# Patient Record
Sex: MALE | Race: WHITE | HISPANIC OR LATINO | URBAN - METROPOLITAN AREA
[De-identification: names, ages, dates, MRNs, and addresses within clinical notes are randomized per-mention and may not be internally consistent; named-entity substitution may affect disease eponyms.]

---

## 2017-02-08 ENCOUNTER — EMERGENCY (EMERGENCY)
Facility: HOSPITAL | Age: 32
LOS: 0 days | Discharge: HOME | End: 2017-02-08
Attending: EMERGENCY MEDICINE | Admitting: EMERGENCY MEDICINE
Payer: COMMERCIAL

## 2017-02-08 VITALS
HEART RATE: 79 BPM | RESPIRATION RATE: 18 BRPM | SYSTOLIC BLOOD PRESSURE: 135 MMHG | DIASTOLIC BLOOD PRESSURE: 77 MMHG | OXYGEN SATURATION: 100 %

## 2017-02-08 VITALS
OXYGEN SATURATION: 100 % | HEART RATE: 89 BPM | SYSTOLIC BLOOD PRESSURE: 157 MMHG | TEMPERATURE: 98 F | DIASTOLIC BLOOD PRESSURE: 91 MMHG | RESPIRATION RATE: 18 BRPM

## 2017-02-08 DIAGNOSIS — Z72.0 TOBACCO USE: ICD-10-CM

## 2017-02-08 DIAGNOSIS — Z79.01 LONG TERM (CURRENT) USE OF ANTICOAGULANTS: ICD-10-CM

## 2017-02-08 DIAGNOSIS — R07.89 OTHER CHEST PAIN: ICD-10-CM

## 2017-02-08 DIAGNOSIS — R07.9 CHEST PAIN, UNSPECIFIED: ICD-10-CM

## 2017-02-08 DIAGNOSIS — I48.91 UNSPECIFIED ATRIAL FIBRILLATION: ICD-10-CM

## 2017-02-08 LAB
ALBUMIN SERPL ELPH-MCNC: 3.9 G/DL — SIGNIFICANT CHANGE UP (ref 3.3–5)
ALP SERPL-CCNC: 93 U/L — SIGNIFICANT CHANGE UP (ref 40–120)
ALT FLD-CCNC: 40 U/L — SIGNIFICANT CHANGE UP (ref 12–78)
ANION GAP SERPL CALC-SCNC: 7 MMOL/L — SIGNIFICANT CHANGE UP (ref 5–17)
AST SERPL-CCNC: 29 U/L — SIGNIFICANT CHANGE UP (ref 15–37)
BASOPHILS # BLD AUTO: 0.1 K/UL — SIGNIFICANT CHANGE UP (ref 0–0.2)
BASOPHILS NFR BLD AUTO: 1.5 % — SIGNIFICANT CHANGE UP (ref 0–2)
BILIRUB SERPL-MCNC: 0.7 MG/DL — SIGNIFICANT CHANGE UP (ref 0.2–1.2)
BUN SERPL-MCNC: 16 MG/DL — SIGNIFICANT CHANGE UP (ref 7–23)
CALCIUM SERPL-MCNC: 8.8 MG/DL — SIGNIFICANT CHANGE UP (ref 8.5–10.1)
CHLORIDE SERPL-SCNC: 107 MMOL/L — SIGNIFICANT CHANGE UP (ref 96–108)
CK SERPL-CCNC: 568 U/L — HIGH (ref 26–308)
CO2 SERPL-SCNC: 28 MMOL/L — SIGNIFICANT CHANGE UP (ref 22–31)
CREAT SERPL-MCNC: 1.18 MG/DL — SIGNIFICANT CHANGE UP (ref 0.5–1.3)
D DIMER BLD IA.RAPID-MCNC: <150 NG/ML DDU — SIGNIFICANT CHANGE UP
EOSINOPHIL # BLD AUTO: 0.1 K/UL — SIGNIFICANT CHANGE UP (ref 0–0.5)
EOSINOPHIL NFR BLD AUTO: 1.3 % — SIGNIFICANT CHANGE UP (ref 0–6)
GLUCOSE SERPL-MCNC: 95 MG/DL — SIGNIFICANT CHANGE UP (ref 70–99)
HCT VFR BLD CALC: 51.1 % — HIGH (ref 39–50)
HGB BLD-MCNC: 16.4 G/DL — SIGNIFICANT CHANGE UP (ref 13–17)
LYMPHOCYTES # BLD AUTO: 1.9 K/UL — SIGNIFICANT CHANGE UP (ref 1–3.3)
LYMPHOCYTES # BLD AUTO: 23.6 % — SIGNIFICANT CHANGE UP (ref 13–44)
MCHC RBC-ENTMCNC: 30.3 PG — SIGNIFICANT CHANGE UP (ref 27–34)
MCHC RBC-ENTMCNC: 32.2 GM/DL — SIGNIFICANT CHANGE UP (ref 32–36)
MCV RBC AUTO: 94.2 FL — SIGNIFICANT CHANGE UP (ref 80–100)
MONOCYTES # BLD AUTO: 0.6 K/UL — SIGNIFICANT CHANGE UP (ref 0–0.9)
MONOCYTES NFR BLD AUTO: 7.9 % — SIGNIFICANT CHANGE UP (ref 2–14)
NEUTROPHILS # BLD AUTO: 5.2 K/UL — SIGNIFICANT CHANGE UP (ref 1.8–7.4)
NEUTROPHILS NFR BLD AUTO: 65.8 % — SIGNIFICANT CHANGE UP (ref 43–77)
PLATELET # BLD AUTO: 289 K/UL — SIGNIFICANT CHANGE UP (ref 150–400)
POTASSIUM SERPL-MCNC: 4 MMOL/L — SIGNIFICANT CHANGE UP (ref 3.5–5.3)
POTASSIUM SERPL-SCNC: 4 MMOL/L — SIGNIFICANT CHANGE UP (ref 3.5–5.3)
PROT SERPL-MCNC: 7.9 GM/DL — SIGNIFICANT CHANGE UP (ref 6–8.3)
RBC # BLD: 5.42 M/UL — SIGNIFICANT CHANGE UP (ref 4.2–5.8)
RBC # FLD: 13 % — SIGNIFICANT CHANGE UP (ref 10.3–14.5)
SODIUM SERPL-SCNC: 142 MMOL/L — SIGNIFICANT CHANGE UP (ref 135–145)
TROPONIN I SERPL-MCNC: <0.015 NG/ML — SIGNIFICANT CHANGE UP (ref 0.01–0.04)
TROPONIN I SERPL-MCNC: <0.015 NG/ML — SIGNIFICANT CHANGE UP (ref 0.01–0.04)
WBC # BLD: 7.9 K/UL — SIGNIFICANT CHANGE UP (ref 3.8–10.5)
WBC # FLD AUTO: 7.9 K/UL — SIGNIFICANT CHANGE UP (ref 3.8–10.5)

## 2017-02-08 PROCEDURE — 93010 ELECTROCARDIOGRAM REPORT: CPT

## 2017-02-08 PROCEDURE — 99284 EMERGENCY DEPT VISIT MOD MDM: CPT | Mod: 25

## 2017-02-08 PROCEDURE — 71010: CPT | Mod: 26

## 2017-02-08 RX ORDER — SODIUM CHLORIDE 9 MG/ML
3 INJECTION INTRAMUSCULAR; INTRAVENOUS; SUBCUTANEOUS ONCE
Qty: 0 | Refills: 0 | Status: COMPLETED | OUTPATIENT
Start: 2017-02-08 | End: 2017-02-08

## 2017-02-08 RX ORDER — ASPIRIN/CALCIUM CARB/MAGNESIUM 324 MG
325 TABLET ORAL ONCE
Qty: 0 | Refills: 0 | Status: COMPLETED | OUTPATIENT
Start: 2017-02-08 | End: 2017-02-08

## 2017-02-08 RX ADMIN — SODIUM CHLORIDE 3 MILLILITER(S): 9 INJECTION INTRAMUSCULAR; INTRAVENOUS; SUBCUTANEOUS at 02:59

## 2017-02-08 RX ADMIN — Medication 325 MILLIGRAM(S): at 02:58

## 2017-02-08 NOTE — ED PROVIDER NOTE - CHPI ED SYMPTOMS NEG
no back pain/no shortness of breath/no chills/no fever/no cough/no nausea/no diaphoresis/no dizziness

## 2017-02-08 NOTE — ED ADULT NURSE NOTE - OBJECTIVE STATEMENT
30 y/o male with hx of Afib on Eliquis presents to ED c/o chest pain started earlier today worsening x2 hours. 30 y/o male with hx of Afib on Eliquis presents to ED c/o chest pain started earlier today worsening last few hours. Pt ran out of Eliquis and missed one dose for today, took 2x metoprolol before coming in to work. Pt did not feel well and BP was elevated with chest tightness.

## 2017-02-08 NOTE — ED ADULT NURSE REASSESSMENT NOTE - NS ED NURSE REASSESS COMMENT FT1
Pt resting comfortably in bed with no acute distress noted. Denies cp,sob,ha,dz,n/v/d or urinary sx. Vitals stable. Will cont to monitor for safety and comfort.

## 2017-02-08 NOTE — ED PROVIDER NOTE - OBJECTIVE STATEMENT
31 m with a hx of a-fib (ran out of his eloquist yest - missed one dose) presents with co chest pain (tightness and fleeting sharp pain) int for the last few hours.  BP was elevated today.  No co HA, dizziness, back pain, fever, chills, nvd.  No recent illnesses.

## 2017-02-08 NOTE — ED ADULT NURSE NOTE - CHPI ED SYMPTOMS NEG
no nausea/no dizziness/no back pain/no syncope/no cough/no shortness of breath/no chills/no diaphoresis/no vomiting/no fever

## 2017-10-11 ENCOUNTER — EMERGENCY (EMERGENCY)
Facility: HOSPITAL | Age: 32
LOS: 0 days | Discharge: HOME | End: 2017-10-11
Attending: EMERGENCY MEDICINE | Admitting: EMERGENCY MEDICINE
Payer: COMMERCIAL

## 2017-10-11 VITALS
DIASTOLIC BLOOD PRESSURE: 70 MMHG | HEART RATE: 88 BPM | OXYGEN SATURATION: 99 % | SYSTOLIC BLOOD PRESSURE: 130 MMHG | RESPIRATION RATE: 18 BRPM

## 2017-10-11 VITALS
OXYGEN SATURATION: 97 % | TEMPERATURE: 100 F | SYSTOLIC BLOOD PRESSURE: 147 MMHG | RESPIRATION RATE: 16 BRPM | HEART RATE: 120 BPM | DIASTOLIC BLOOD PRESSURE: 97 MMHG

## 2017-10-11 LAB
ALBUMIN SERPL ELPH-MCNC: 3.8 G/DL — SIGNIFICANT CHANGE UP (ref 3.3–5)
ALP SERPL-CCNC: 85 U/L — SIGNIFICANT CHANGE UP (ref 40–120)
ALT FLD-CCNC: 41 U/L — SIGNIFICANT CHANGE UP (ref 12–78)
ANION GAP SERPL CALC-SCNC: 10 MMOL/L — SIGNIFICANT CHANGE UP (ref 5–17)
AST SERPL-CCNC: 26 U/L — SIGNIFICANT CHANGE UP (ref 15–37)
BASOPHILS # BLD AUTO: 0.1 K/UL — SIGNIFICANT CHANGE UP (ref 0–0.2)
BASOPHILS NFR BLD AUTO: 0.8 % — SIGNIFICANT CHANGE UP (ref 0–2)
BILIRUB SERPL-MCNC: 1.6 MG/DL — HIGH (ref 0.2–1.2)
BUN SERPL-MCNC: 12 MG/DL — SIGNIFICANT CHANGE UP (ref 7–23)
CALCIUM SERPL-MCNC: 8.9 MG/DL — SIGNIFICANT CHANGE UP (ref 8.5–10.1)
CHLORIDE SERPL-SCNC: 103 MMOL/L — SIGNIFICANT CHANGE UP (ref 96–108)
CO2 SERPL-SCNC: 24 MMOL/L — SIGNIFICANT CHANGE UP (ref 22–31)
CREAT SERPL-MCNC: 1.12 MG/DL — SIGNIFICANT CHANGE UP (ref 0.5–1.3)
EOSINOPHIL # BLD AUTO: 0.1 K/UL — SIGNIFICANT CHANGE UP (ref 0–0.5)
EOSINOPHIL NFR BLD AUTO: 0.4 % — SIGNIFICANT CHANGE UP (ref 0–6)
GLUCOSE SERPL-MCNC: 94 MG/DL — SIGNIFICANT CHANGE UP (ref 70–99)
HCT VFR BLD CALC: 50.3 % — HIGH (ref 39–50)
HGB BLD-MCNC: 16.4 G/DL — SIGNIFICANT CHANGE UP (ref 13–17)
LACTATE SERPL-SCNC: 0.9 MMOL/L — SIGNIFICANT CHANGE UP (ref 0.7–2)
LYMPHOCYTES # BLD AUTO: 1.8 K/UL — SIGNIFICANT CHANGE UP (ref 1–3.3)
LYMPHOCYTES # BLD AUTO: 14.7 % — SIGNIFICANT CHANGE UP (ref 13–44)
MCHC RBC-ENTMCNC: 29.8 PG — SIGNIFICANT CHANGE UP (ref 27–34)
MCHC RBC-ENTMCNC: 32.5 GM/DL — SIGNIFICANT CHANGE UP (ref 32–36)
MCV RBC AUTO: 91.8 FL — SIGNIFICANT CHANGE UP (ref 80–100)
MONOCYTES # BLD AUTO: 0.8 K/UL — SIGNIFICANT CHANGE UP (ref 0–0.9)
MONOCYTES NFR BLD AUTO: 6.5 % — SIGNIFICANT CHANGE UP (ref 2–14)
NEUTROPHILS # BLD AUTO: 9.7 K/UL — HIGH (ref 1.8–7.4)
NEUTROPHILS NFR BLD AUTO: 77.6 % — HIGH (ref 43–77)
PLATELET # BLD AUTO: 268 K/UL — SIGNIFICANT CHANGE UP (ref 150–400)
POTASSIUM SERPL-MCNC: 3.7 MMOL/L — SIGNIFICANT CHANGE UP (ref 3.5–5.3)
POTASSIUM SERPL-SCNC: 3.7 MMOL/L — SIGNIFICANT CHANGE UP (ref 3.5–5.3)
PROT SERPL-MCNC: 8.7 GM/DL — HIGH (ref 6–8.3)
RBC # BLD: 5.48 M/UL — SIGNIFICANT CHANGE UP (ref 4.2–5.8)
RBC # FLD: 11.7 % — SIGNIFICANT CHANGE UP (ref 10.3–14.5)
SODIUM SERPL-SCNC: 137 MMOL/L — SIGNIFICANT CHANGE UP (ref 135–145)
WBC # BLD: 12.5 K/UL — HIGH (ref 3.8–10.5)
WBC # FLD AUTO: 12.5 K/UL — HIGH (ref 3.8–10.5)

## 2017-10-11 PROCEDURE — 99285 EMERGENCY DEPT VISIT HI MDM: CPT | Mod: 25

## 2017-10-11 PROCEDURE — 70491 CT SOFT TISSUE NECK W/DYE: CPT | Mod: 26

## 2017-10-11 RX ORDER — SODIUM CHLORIDE 9 MG/ML
500 INJECTION INTRAMUSCULAR; INTRAVENOUS; SUBCUTANEOUS
Qty: 0 | Refills: 0 | Status: COMPLETED | OUTPATIENT
Start: 2017-10-11 | End: 2017-10-11

## 2017-10-11 RX ORDER — ACETAMINOPHEN 500 MG
1000 TABLET ORAL ONCE
Qty: 0 | Refills: 0 | Status: COMPLETED | OUTPATIENT
Start: 2017-10-11 | End: 2017-10-11

## 2017-10-11 RX ORDER — DEXAMETHASONE 0.5 MG/5ML
10 ELIXIR ORAL ONCE
Qty: 0 | Refills: 0 | Status: COMPLETED | OUTPATIENT
Start: 2017-10-11 | End: 2017-10-11

## 2017-10-11 RX ORDER — SODIUM CHLORIDE 9 MG/ML
3 INJECTION INTRAMUSCULAR; INTRAVENOUS; SUBCUTANEOUS ONCE
Qty: 0 | Refills: 0 | Status: COMPLETED | OUTPATIENT
Start: 2017-10-11 | End: 2017-10-11

## 2017-10-11 RX ORDER — KETOROLAC TROMETHAMINE 30 MG/ML
30 SYRINGE (ML) INJECTION ONCE
Qty: 0 | Refills: 0 | Status: DISCONTINUED | OUTPATIENT
Start: 2017-10-11 | End: 2017-10-11

## 2017-10-11 RX ORDER — AMPICILLIN SODIUM AND SULBACTAM SODIUM 250; 125 MG/ML; MG/ML
3 INJECTION, POWDER, FOR SUSPENSION INTRAMUSCULAR; INTRAVENOUS ONCE
Qty: 0 | Refills: 0 | Status: COMPLETED | OUTPATIENT
Start: 2017-10-11 | End: 2017-10-11

## 2017-10-11 RX ADMIN — SODIUM CHLORIDE 2000 MILLILITER(S): 9 INJECTION INTRAMUSCULAR; INTRAVENOUS; SUBCUTANEOUS at 20:02

## 2017-10-11 RX ADMIN — Medication 1000 MILLIGRAM(S): at 20:36

## 2017-10-11 RX ADMIN — SODIUM CHLORIDE 3 MILLILITER(S): 9 INJECTION INTRAMUSCULAR; INTRAVENOUS; SUBCUTANEOUS at 19:43

## 2017-10-11 RX ADMIN — SODIUM CHLORIDE 2000 MILLILITER(S): 9 INJECTION INTRAMUSCULAR; INTRAVENOUS; SUBCUTANEOUS at 19:02

## 2017-10-11 RX ADMIN — Medication 102 MILLIGRAM(S): at 19:43

## 2017-10-11 RX ADMIN — SODIUM CHLORIDE 2000 MILLILITER(S): 9 INJECTION INTRAMUSCULAR; INTRAVENOUS; SUBCUTANEOUS at 19:32

## 2017-10-11 RX ADMIN — SODIUM CHLORIDE 2000 MILLILITER(S): 9 INJECTION INTRAMUSCULAR; INTRAVENOUS; SUBCUTANEOUS at 19:47

## 2017-10-11 RX ADMIN — SODIUM CHLORIDE 2000 MILLILITER(S): 9 INJECTION INTRAMUSCULAR; INTRAVENOUS; SUBCUTANEOUS at 20:17

## 2017-10-11 RX ADMIN — AMPICILLIN SODIUM AND SULBACTAM SODIUM 200 GRAM(S): 250; 125 INJECTION, POWDER, FOR SUSPENSION INTRAMUSCULAR; INTRAVENOUS at 19:43

## 2017-10-11 RX ADMIN — SODIUM CHLORIDE 2000 MILLILITER(S): 9 INJECTION INTRAMUSCULAR; INTRAVENOUS; SUBCUTANEOUS at 19:17

## 2017-10-11 RX ADMIN — Medication 1000 MILLIGRAM(S): at 19:43

## 2017-10-11 RX ADMIN — Medication 30 MILLIGRAM(S): at 19:43

## 2017-10-11 RX ADMIN — Medication 30 MILLIGRAM(S): at 20:36

## 2017-10-11 NOTE — ED STATDOCS - ATTENDING CONTRIBUTION TO CARE
I, John Bowman, performed the initial face to face bedside interview with this patient regarding history of present illness, review of symptoms and relevant past medical, social and family history.  I completed an independent physical examination.  I was the initial provider who evaluated this patient. I have signed out the follow up of any pending tests (i.e. labs, radiological studies) to the ACP.  I have communicated the patient’s plan of care and disposition with the ACP.  The history, relevant review of systems, past medical and surgical history, medical decision making, and physical examination was documented by the scribe in my presence and I attest to the accuracy of the documentation.

## 2017-10-11 NOTE — ED STATDOCS - OBJECTIVE STATEMENT
32 y/o male with PMHx of HTN, paroxysmal Afib presents to the ED c/o throat pain and swollen glands.  He is c/o difficulty swallowing and talking due to swelling.  Pt states his voice sounds different than usual.  Put on Augmentin yesterday at Urgent Care, took 2 doses yesterday and 1 today.  NKDA.

## 2017-10-11 NOTE — ED STATDOCS - ENMT, MLM
(+) erythema throat, swelling b/l tonsils, right greater than left with some exudate.  (+) Swollen cervical lymph nodes.  Nasal mucosa clear.  Mouth with normal mucosa. and uvula is midline.

## 2017-10-11 NOTE — ED ADULT NURSE NOTE - OBJECTIVE STATEMENT
Sorethroat/ swollen x 3 days pt states he was seen at [facility], prescript for Amoxicillin. pt states today feels worse. denies fever.

## 2017-10-11 NOTE — ED ADULT TRIAGE NOTE - CHIEF COMPLAINT QUOTE
Pt. c/o swollen glands, was seen by his PCP and told to come to the hospital. pt. c/o diff. swallowing and talking.

## 2017-10-11 NOTE — ED STATDOCS - PROGRESS NOTE DETAILS
31 yr. old male PMH: P-A-Fib, presents to ED with sore throat and swollen glands onset 3 days ago. Reports difficulty swallowing and talking due to swelling. Seen at Urgent Care yesterday and Rx. Augmentin took 2 doses yesterday and 1 dose today. Seen and examined by attending in Intake. Plan: IV, IVF, Labs, CT neck soft tissue, Decadron. Will F/U with results and re evaluate. Radames NP Patient feeling better waiting for CT scan to be done. Radames MEHTA

## 2017-10-12 DIAGNOSIS — J02.9 ACUTE PHARYNGITIS, UNSPECIFIED: ICD-10-CM

## 2017-10-12 DIAGNOSIS — R59.0 LOCALIZED ENLARGED LYMPH NODES: ICD-10-CM

## 2017-10-17 LAB
CULTURE RESULTS: SIGNIFICANT CHANGE UP
CULTURE RESULTS: SIGNIFICANT CHANGE UP
SPECIMEN SOURCE: SIGNIFICANT CHANGE UP
SPECIMEN SOURCE: SIGNIFICANT CHANGE UP

## 2020-04-01 ENCOUNTER — EMERGENCY (EMERGENCY)
Facility: HOSPITAL | Age: 35
LOS: 0 days | Discharge: HOME | End: 2020-04-01
Attending: EMERGENCY MEDICINE
Payer: COMMERCIAL

## 2020-04-01 VITALS
HEART RATE: 118 BPM | SYSTOLIC BLOOD PRESSURE: 159 MMHG | OXYGEN SATURATION: 97 % | DIASTOLIC BLOOD PRESSURE: 90 MMHG | RESPIRATION RATE: 18 BRPM | TEMPERATURE: 99 F

## 2020-04-01 VITALS — HEIGHT: 64 IN | WEIGHT: 229.94 LBS

## 2020-04-01 DIAGNOSIS — T78.49XA OTHER ALLERGY, INITIAL ENCOUNTER: ICD-10-CM
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
## 2020-04-01 DIAGNOSIS — J02.9 ACUTE PHARYNGITIS, UNSPECIFIED: ICD-10-CM

## 2020-04-01 DIAGNOSIS — L29.9 PRURITUS, UNSPECIFIED: ICD-10-CM

## 2020-04-01 DIAGNOSIS — R51 HEADACHE: ICD-10-CM

## 2020-04-01 DIAGNOSIS — I48.91 UNSPECIFIED ATRIAL FIBRILLATION: ICD-10-CM

## 2020-04-01 DIAGNOSIS — I10 ESSENTIAL (PRIMARY) HYPERTENSION: ICD-10-CM
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
## 2020-04-01 PROCEDURE — 99282 EMERGENCY DEPT VISIT SF MDM: CPT

## 2020-04-01 PROCEDURE — 99053 MED SERV 10PM-8AM 24 HR FAC: CPT

## 2020-04-01 RX ORDER — DIPHENHYDRAMINE HCL 50 MG
25 CAPSULE ORAL ONCE
Refills: 0 | Status: COMPLETED | OUTPATIENT
Start: 2020-04-01 | End: 2020-04-01

## 2020-04-01 RX ADMIN — Medication 25 MILLIGRAM(S): at 01:28

## 2020-04-01 NOTE — ED PROVIDER NOTE - NSFOLLOWUPINSTRUCTIONS_ED_ALL_ED_FT
Benadryl 25mg every 6 hours for the itching  Return to ED for any further concern  Use different gloves at work.

## 2020-04-01 NOTE — ED PROVIDER NOTE - OBJECTIVE STATEMENT
35 yo male with h/o Afib, c/o reaction to hands while at work.  Pt works in housekeeping in .  Was wearing gloves and using the usual chemicals for cleaning.  He has put on and taken off multiple pairs of gloves tonight without issue.  Just PTA, took off gloves after cleaning a room and noticed a white film on his hands.  Predominately on the right second and third palmar surface and spots on dorsal surface and on left thumb.  Itchy.  No other body parts with similar reaction or itching.

## 2020-04-01 NOTE — ED PROVIDER NOTE - SKIN, MLM
Mild erythema to the bilateral hands with a nonblanching, nonremovable white discoloration to the right palmar surface of the second and third digits with spots of similar discoloration to the dorsal surface and the left thumb.  No open skin, lesions, vesicles

## 2020-04-01 NOTE — ED PROVIDER NOTE - PATIENT PORTAL LINK FT
You can access the FollowMyHealth Patient Portal offered by [facility] by registering at the following website: [email or web address]. By joining [employer]’s FollowMyHealth portal, you will also be able to view your health information using other applications (apps) compatible with our system.

## 2020-04-01 NOTE — ED ADULT NURSE NOTE - OBJECTIVE STATEMENT
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

## 2020-04-01 NOTE — ED ADULT TRIAGE NOTE - CHIEF COMPLAINT QUOTE
had a reaction to R hand while cleaning rooms in the ED, patient was wearing gloves. Site is discolored and itchy according to patient.

## 2020-04-01 NOTE — ED PROVIDER NOTE - CLINICAL SUMMARY MEDICAL DECISION MAKING FREE TEXT BOX
skin reaction, chemical burn vs allergic reaction.  the gloves being used are relatively new, but otherwise no new chemicals.  there was no spill or massive exposure tonight.  will give benadryl and pt should use other gloves

## 2020-04-26 ENCOUNTER — MESSAGE (OUTPATIENT)
Age: 35
End: 2020-04-26

## 2020-11-03 ENCOUNTER — EMERGENCY (EMERGENCY)
Facility: HOSPITAL | Age: 35
LOS: 0 days | Discharge: HOME | End: 2020-11-03
Payer: COMMERCIAL

## 2020-11-03 VITALS
SYSTOLIC BLOOD PRESSURE: 162 MMHG | DIASTOLIC BLOOD PRESSURE: 99 MMHG | HEIGHT: 64 IN | OXYGEN SATURATION: 97 % | HEART RATE: 95 BPM | RESPIRATION RATE: 18 BRPM | TEMPERATURE: 98 F

## 2020-11-03 DIAGNOSIS — B34.9 VIRAL INFECTION, UNSPECIFIED: ICD-10-CM

## 2020-11-03 DIAGNOSIS — Z20.828 CONTACT WITH AND (SUSPECTED) EXPOSURE TO OTHER VIRAL COMMUNICABLE DISEASES: ICD-10-CM

## 2020-11-03 DIAGNOSIS — R09.81 NASAL CONGESTION: ICD-10-CM

## 2020-11-03 DIAGNOSIS — R09.89 OTHER SPECIFIED SYMPTOMS AND SIGNS INVOLVING THE CIRCULATORY AND RESPIRATORY SYSTEMS: ICD-10-CM

## 2020-11-03 DIAGNOSIS — R05 COUGH: ICD-10-CM

## 2020-11-03 LAB — SARS-COV-2 RNA SPEC QL NAA+PROBE: SIGNIFICANT CHANGE UP

## 2020-11-03 PROCEDURE — 99283 EMERGENCY DEPT VISIT LOW MDM: CPT

## 2020-11-03 PROCEDURE — U0003: CPT

## 2020-11-03 NOTE — ED STATDOCS - OBJECTIVE STATEMENT
Pt presents to ED with congestion, cough, runny nose x 2 days. Pt not recently exposed to COVID-19. Pt here for testing. Pt is [employer] employee.

## 2020-11-03 NOTE — ED STATDOCS - PATIENT PORTAL LINK FT
You can access the FollowMyHealth Patient Portal offered by [facility] by registering at the following website: [email or web address]. By joining [employer]’s FollowMyHealth portal, you will also be able to view your health information using other applications (apps) compatible with our system.

## 2020-11-03 NOTE — ED ADULT TRIAGE NOTE - CHIEF COMPLAINT QUOTE
pt presents to ED requesting COVID testing. No symptoms reported.     Patient evaluated, treated, and discharged by PA. Refer to PA note for assessment.  Discharge instructions given verbally and understood by patient. Self-quarantine and COVID-19 information provided to patient. Unable to sign secondary to COVID-19 PUI.  pt gives verbal consent to text results

## 2020-11-11 ENCOUNTER — EMERGENCY (EMERGENCY)
Facility: HOSPITAL | Age: 35
LOS: 0 days | Discharge: LEFT BEFORE DISCHARGE | End: 2020-11-12
Attending: EMERGENCY MEDICINE
Payer: COMMERCIAL

## 2020-11-11 VITALS
DIASTOLIC BLOOD PRESSURE: 85 MMHG | HEIGHT: 64 IN | WEIGHT: 300.05 LBS | TEMPERATURE: 98 F | HEART RATE: 126 BPM | SYSTOLIC BLOOD PRESSURE: 154 MMHG | OXYGEN SATURATION: 98 % | RESPIRATION RATE: 18 BRPM

## 2020-11-11 DIAGNOSIS — H47.10 UNSPECIFIED PAPILLEDEMA: ICD-10-CM

## 2020-11-11 DIAGNOSIS — Z53.8 PROCEDURE AND TREATMENT NOT CARRIED OUT FOR OTHER REASONS: ICD-10-CM

## 2020-11-11 DIAGNOSIS — I48.0 PAROXYSMAL ATRIAL FIBRILLATION: ICD-10-CM

## 2020-11-11 DIAGNOSIS — I10 ESSENTIAL (PRIMARY) HYPERTENSION: ICD-10-CM

## 2020-11-11 LAB
ALBUMIN SERPL ELPH-MCNC: 3.3 G/DL — SIGNIFICANT CHANGE UP (ref 3.3–5)
ALP SERPL-CCNC: 86 U/L — SIGNIFICANT CHANGE UP (ref 40–120)
ALT FLD-CCNC: 43 U/L — SIGNIFICANT CHANGE UP (ref 12–78)
ANION GAP SERPL CALC-SCNC: 4 MMOL/L — LOW (ref 5–17)
APTT BLD: 31.5 SEC — SIGNIFICANT CHANGE UP (ref 27.5–35.5)
AST SERPL-CCNC: 35 U/L — SIGNIFICANT CHANGE UP (ref 15–37)
BASOPHILS # BLD AUTO: 0.02 K/UL — SIGNIFICANT CHANGE UP (ref 0–0.2)
BASOPHILS NFR BLD AUTO: 0.2 % — SIGNIFICANT CHANGE UP (ref 0–2)
BILIRUB SERPL-MCNC: 0.5 MG/DL — SIGNIFICANT CHANGE UP (ref 0.2–1.2)
BUN SERPL-MCNC: 15 MG/DL — SIGNIFICANT CHANGE UP (ref 7–23)
CALCIUM SERPL-MCNC: 8.3 MG/DL — LOW (ref 8.5–10.1)
CHLORIDE SERPL-SCNC: 108 MMOL/L — SIGNIFICANT CHANGE UP (ref 96–108)
CO2 SERPL-SCNC: 29 MMOL/L — SIGNIFICANT CHANGE UP (ref 22–31)
CREAT SERPL-MCNC: 1.26 MG/DL — SIGNIFICANT CHANGE UP (ref 0.5–1.3)
EOSINOPHIL # BLD AUTO: 0.14 K/UL — SIGNIFICANT CHANGE UP (ref 0–0.5)
EOSINOPHIL NFR BLD AUTO: 1.5 % — SIGNIFICANT CHANGE UP (ref 0–6)
GLUCOSE SERPL-MCNC: 118 MG/DL — HIGH (ref 70–99)
HCT VFR BLD CALC: 47.9 % — SIGNIFICANT CHANGE UP (ref 39–50)
HGB BLD-MCNC: 15 G/DL — SIGNIFICANT CHANGE UP (ref 13–17)
IMM GRANULOCYTES NFR BLD AUTO: 0.3 % — SIGNIFICANT CHANGE UP (ref 0–1.5)
INR BLD: 0.96 RATIO — SIGNIFICANT CHANGE UP (ref 0.88–1.16)
LYMPHOCYTES # BLD AUTO: 2.93 K/UL — SIGNIFICANT CHANGE UP (ref 1–3.3)
LYMPHOCYTES # BLD AUTO: 31.4 % — SIGNIFICANT CHANGE UP (ref 13–44)
MCHC RBC-ENTMCNC: 29.6 PG — SIGNIFICANT CHANGE UP (ref 27–34)
MCHC RBC-ENTMCNC: 31.3 GM/DL — LOW (ref 32–36)
MCV RBC AUTO: 94.5 FL — SIGNIFICANT CHANGE UP (ref 80–100)
MONOCYTES # BLD AUTO: 0.96 K/UL — HIGH (ref 0–0.9)
MONOCYTES NFR BLD AUTO: 10.3 % — SIGNIFICANT CHANGE UP (ref 2–14)
NEUTROPHILS # BLD AUTO: 5.24 K/UL — SIGNIFICANT CHANGE UP (ref 1.8–7.4)
NEUTROPHILS NFR BLD AUTO: 56.3 % — SIGNIFICANT CHANGE UP (ref 43–77)
PLATELET # BLD AUTO: 237 K/UL — SIGNIFICANT CHANGE UP (ref 150–400)
POTASSIUM SERPL-MCNC: 4.1 MMOL/L — SIGNIFICANT CHANGE UP (ref 3.5–5.3)
POTASSIUM SERPL-SCNC: 4.1 MMOL/L — SIGNIFICANT CHANGE UP (ref 3.5–5.3)
PROT SERPL-MCNC: 7.6 GM/DL — SIGNIFICANT CHANGE UP (ref 6–8.3)
PROTHROM AB SERPL-ACNC: 11.3 SEC — SIGNIFICANT CHANGE UP (ref 10.6–13.6)
RBC # BLD: 5.07 M/UL — SIGNIFICANT CHANGE UP (ref 4.2–5.8)
RBC # FLD: 13.2 % — SIGNIFICANT CHANGE UP (ref 10.3–14.5)
SODIUM SERPL-SCNC: 141 MMOL/L — SIGNIFICANT CHANGE UP (ref 135–145)
WBC # BLD: 9.32 K/UL — SIGNIFICANT CHANGE UP (ref 3.8–10.5)
WBC # FLD AUTO: 9.32 K/UL — SIGNIFICANT CHANGE UP (ref 3.8–10.5)

## 2020-11-11 PROCEDURE — 99285 EMERGENCY DEPT VISIT HI MDM: CPT | Mod: 25

## 2020-11-11 PROCEDURE — 36415 COLL VENOUS BLD VENIPUNCTURE: CPT

## 2020-11-11 PROCEDURE — 62270 DX LMBR SPI PNXR: CPT

## 2020-11-11 PROCEDURE — 85730 THROMBOPLASTIN TIME PARTIAL: CPT

## 2020-11-11 PROCEDURE — 93005 ELECTROCARDIOGRAM TRACING: CPT

## 2020-11-11 PROCEDURE — 80053 COMPREHEN METABOLIC PANEL: CPT

## 2020-11-11 PROCEDURE — 70496 CT ANGIOGRAPHY HEAD: CPT

## 2020-11-11 PROCEDURE — 85610 PROTHROMBIN TIME: CPT

## 2020-11-11 PROCEDURE — 93010 ELECTROCARDIOGRAM REPORT: CPT

## 2020-11-11 PROCEDURE — 85025 COMPLETE CBC W/AUTO DIFF WBC: CPT

## 2020-11-11 PROCEDURE — 85652 RBC SED RATE AUTOMATED: CPT

## 2020-11-11 PROCEDURE — 62270 DX LMBR SPI PNXR: CPT | Mod: 53

## 2020-11-11 PROCEDURE — 99285 EMERGENCY DEPT VISIT HI MDM: CPT

## 2020-11-11 NOTE — ED PROVIDER NOTE - OBJECTIVE STATEMENT
34M hx htn afib on xarelto presents to the ED for papilledema. Pt was being treated for sinus infection with zpack - had routine optho eval found to have papilledema- -sent to specialist tonight at sight MD. did eye scans 20/20 vision b/l but significant papilledema sent in to get ct imaging/spinal tap to r/o pseudotumor vs CST. pt currently denies any pain, headache visual changes nausea vomiting.

## 2020-11-11 NOTE — ED PROVIDER NOTE - CLINICAL SUMMARY MEDICAL DECISION MAKING FREE TEXT BOX
34M hx htn afib on xarelto presents to the ED for papilledema. Pt was being treated for sinus infection with zpack - had routine optho eval found to have papilledema- -sent to specialist tonight at sight MD Dr Kiser . did eye scans 20/20 vision b/l but significant papilledema sent in to get ct imaging/spinal tap to r/o pseudotumor vs CST. pt currently denies any pain, headache visual changes nausea vomiting.  exam with 20/20 b/l pressure 16 b/l. given significant papilledema will obtain labs ct lp and reassess. Kevan Jackson M.D., Attending Physician

## 2020-11-11 NOTE — ED PROVIDER NOTE - PROGRESS NOTE DETAILS
awaiting CT head - pt consented for LP. given pt body habitus did bedside US to see how deep spine is. spine is at 10-12cm on ultrasound in location of LP. this is out of range for our ED 3.5in spinal needles so procedure was not performed. Called nursing admin to get longer LP needle. Paged Dr. Pulliam of neurology to discuss. Kevan aJckson M.D., Attending Physician Spoke with Dr. Edwards covering neurologist - discussed case. ctv negative. rec for MRI LP and admission. will see patient in morning. Kevan Jackson M.D., Attending Physician attempted LP with long spinal needle  however unsuccessful. pt will require IR for LP and MR in am. Kevan Jackson M.D., Attending Physician pt states he wants to leave- discussed need for LP to r/o IIH, MRI to r/o mass. explained not figuring out what is causing papilledema could lead to blindness/be life threatening. pt understands but wants to go. pt is aaox4 and of sound mind to make this decsion. pt given neurology (has optho ) follow up. strongly encouraged to return immediately to the ED if he wants to complete his workup. Kevan Jackson M.D., Attending Physician

## 2020-11-11 NOTE — ED ADULT TRIAGE NOTE - CHIEF COMPLAINT QUOTE
pt was sent by eye doctor for significant papilledema . r/o pseudotumor/ Denies vision changes, eye pressure, headache.

## 2020-11-11 NOTE — ED PROVIDER NOTE - NSFOLLOWUPINSTRUCTIONS_ED_ALL_ED_FT
1. return for worsening symptoms or anything concerning to you  2. take all home meds as prescribed  3. follow up with your pmd call to make an appointment  4. you are leaving AMA. You have papilledema and if not treated could lead to blindness/underlying cause could be life threatening. if you want to complete your workup return immediately to the ED>   5. follow up with your eye doctor  6. follow up with neurology

## 2020-11-11 NOTE — ED ADULT NURSE NOTE - EXPLANATION OF PATIENT'S REASON FOR LEAVING
pt did not want to stay in hospital any longer, states he will f/u outpatient. See nurse reassessment note.

## 2020-11-11 NOTE — ED ADULT NURSE NOTE - OBJECTIVE STATEMENT
Patient went for an annual eye exam which sent him to a eye specialist which told him to come to the ED for a CT scan to rule out papilledema.  Patient denies any symptoms except for a sinus infection that he has been dealing with.  Denies vision changes, eye pain or eye pressure.

## 2020-11-11 NOTE — ED PROVIDER NOTE - PATIENT PORTAL LINK FT
You can access the FollowMyHealth Patient Portal offered by [facility] by registering at the following website: [email or web address]. By joining [employer]’s FollowMyHealth portal, you will also be able to view your health information using other applications (apps) compatible with our system.

## 2020-11-11 NOTE — ED PROVIDER NOTE - PHYSICAL EXAMINATION
Constitutional: NAD AAOx3  Eyes: PERRLA EOMI 20/20 b/l pressure 16 b/l  Head: Normocephalic atraumatic  Mouth: MMM  Cardiac: tachycardic  Resp: Lungs CTAB  GI: Abd s/nt/nd  Neuro: CN2-12 intact  Skin: No rashes

## 2020-11-12 VITALS
DIASTOLIC BLOOD PRESSURE: 76 MMHG | RESPIRATION RATE: 20 BRPM | HEART RATE: 96 BPM | OXYGEN SATURATION: 100 % | TEMPERATURE: 99 F | SYSTOLIC BLOOD PRESSURE: 130 MMHG

## 2020-11-12 PROBLEM — Z00.00 ENCOUNTER FOR PREVENTIVE HEALTH EXAMINATION: Status: ACTIVE | Noted: 2020-11-12

## 2020-11-12 LAB — ERYTHROCYTE [SEDIMENTATION RATE] IN BLOOD: 5 MM/HR — SIGNIFICANT CHANGE UP (ref 0–15)

## 2020-11-12 PROCEDURE — 70496 CT ANGIOGRAPHY HEAD: CPT | Mod: 26

## 2020-11-12 NOTE — ED ADULT NURSE REASSESSMENT NOTE - NS ED NURSE REASSESS COMMENT FT1
Pt does not want to be admitted for full work up including LP and MRI to rule out mass. Pt is AAOx4. D/w patient importance of being admitted to have scans and lumbar puncture done. Reiterated the importance of f/u with doctor outpatient for scans. Pt states he will follow up outpatient. Discussed thoroughly s/s for patient to come back into the ER.

## 2020-12-03 ENCOUNTER — APPOINTMENT (OUTPATIENT)
Dept: NEUROLOGY | Facility: CLINIC | Age: 35
End: 2020-12-03
Payer: COMMERCIAL

## 2020-12-03 VITALS
SYSTOLIC BLOOD PRESSURE: 124 MMHG | WEIGHT: 315 LBS | DIASTOLIC BLOOD PRESSURE: 80 MMHG | BODY MASS INDEX: 45.1 KG/M2 | HEIGHT: 70 IN | HEART RATE: 88 BPM | TEMPERATURE: 97.5 F

## 2020-12-03 DIAGNOSIS — Z78.9 OTHER SPECIFIED HEALTH STATUS: ICD-10-CM

## 2020-12-03 DIAGNOSIS — Z83.3 FAMILY HISTORY OF DIABETES MELLITUS: ICD-10-CM

## 2020-12-03 DIAGNOSIS — Z87.09 PERSONAL HISTORY OF OTHER DISEASES OF THE RESPIRATORY SYSTEM: ICD-10-CM

## 2020-12-03 DIAGNOSIS — F17.200 NICOTINE DEPENDENCE, UNSPECIFIED, UNCOMPLICATED: ICD-10-CM

## 2020-12-03 DIAGNOSIS — Z82.49 FAMILY HISTORY OF ISCHEMIC HEART DISEASE AND OTHER DISEASES OF THE CIRCULATORY SYSTEM: ICD-10-CM

## 2020-12-03 PROCEDURE — 99204 OFFICE O/P NEW MOD 45 MIN: CPT

## 2020-12-03 PROCEDURE — 99072 ADDL SUPL MATRL&STAF TM PHE: CPT

## 2020-12-03 NOTE — DISCUSSION/SUMMARY
[FreeTextEntry1] : He is  34-year-old patient with obesity with incidental finding of bilateral papilledema noted on incidental examination seen and evaluated by ophthalmology and recommended ER evaluation and unable to perform lumbar puncture recommended neurological evaluation.\par Patient coming for evaluation for possible pseudotumor cerebri.\par Patient has no symptoms of headache, dizziness, focal weakness or headaches.\par Central causes to be ruled out.\par Patient had CT scan of the brain without contrast and CT venogram done.\par  Recommend patient to have MRI of the brain with and without contrast with MRV .\par Lumbar puncture under fluoroscopy to rule out pseudotumor cerebri to be arranged.\par Discussed with the patient at length.\par Patient education provided regarding losing weight and exercising.\par Followup evaluation after the above workup is completed.\par Follow up with you for other medical problems.\par

## 2020-12-03 NOTE — REASON FOR VISIT
[Consultation] : a consultation visit [FreeTextEntry1] : Evaluation for Bilateral Papilledema asymptomatic seen by Opthalmology and sent to ER and ref to office

## 2020-12-03 NOTE — REVIEW OF SYSTEMS
[Negative] : Heme/Lymph [FreeTextEntry2] : Obesity [de-identified] : Incidental Papilledema noted on eye exam

## 2020-12-03 NOTE — PHYSICAL EXAM
[FreeTextEntry1] : Obese [Oriented To Time, Place, And Person] : oriented to person, place, and time [Impaired Insight] : insight and judgment were intact [Affect] : the affect was normal [Person] : oriented to person [Place] : oriented to place [Time] : oriented to time [Concentration Intact] : normal concentrating ability [Visual Intact] : visual attention was ~T not ~L decreased [Naming Objects] : no difficulty naming common objects [Repeating Phrases] : no difficulty repeating a phrase [Writing A Sentence] : no difficulty writing a sentence [Fluency] : fluency intact [Comprehension] : comprehension intact [Reading] : reading intact [Past History] : adequate knowledge of personal past history [Cranial Nerves Oculomotor (III)] : extraocular motion intact [Cranial Nerves Trigeminal (V)] : facial sensation intact symmetrically [Cranial Nerves Facial (VII)] : face symmetrical [Cranial Nerves Vestibulocochlear (VIII)] : hearing was intact bilaterally [Cranial Nerves Glossopharyngeal (IX)] : tongue and palate midline [Cranial Nerves Accessory (XI - Cranial And Spinal)] : head turning and shoulder shrug symmetric [Cranial Nerves Hypoglossal (XII)] : there was no tongue deviation with protrusion [Motor Strength] : muscle strength was normal in all four extremities [No Muscle Atrophy] : normal bulk in all four extremities [Sensation Tactile Decrease] : light touch was intact [Balance] : balance was intact [Past-pointing] : there was no past-pointing [Tremor] : no tremor present [2+] : Brachioradialis left 2+ [1+] : Ankle jerk left 1+ [Plantar Reflex Right Only] : normal on the right [Plantar Reflex Left Only] : normal on the left [FreeTextEntry5] : Bilat discrete blurring of disc margins  [Sclera] : the sclera and conjunctiva were normal [PERRL With Normal Accommodation] : pupils were equal in size, round, reactive to light, with normal accommodation [Extraocular Movements] : extraocular movements were intact [Outer Ear] : the ears and nose were normal in appearance [Oropharynx] : the oropharynx was normal [Neck Appearance] : the appearance of the neck was normal [Neck Cervical Mass (___cm)] : no neck mass was observed [Jugular Venous Distention Increased] : there was no jugular-venous distention [Thyroid Diffuse Enlargement] : the thyroid was not enlarged [Thyroid Nodule] : there were no palpable thyroid nodules [Auscultation Breath Sounds / Voice Sounds] : lungs were clear to auscultation bilaterally [Heart Rate And Rhythm] : heart rate was normal and rhythm regular [Heart Sounds] : normal S1 and S2 [Heart Sounds Gallop] : no gallops [Murmurs] : no murmurs [Heart Sounds Pericardial Friction Rub] : no pericardial rub [Full Pulse] : the pedal pulses are present [Edema] : there was no peripheral edema [Bowel Sounds] : normal bowel sounds [Abdomen Soft] : soft [Abdomen Tenderness] : non-tender [Abdomen Mass (___ Cm)] : no abdominal mass palpated [No CVA Tenderness] : no ~M costovertebral angle tenderness [No Spinal Tenderness] : no spinal tenderness [Abnormal Walk] : normal gait [Nail Clubbing] : no clubbing  or cyanosis of the fingernails [Musculoskeletal - Swelling] : no joint swelling seen [Motor Tone] : muscle strength and tone were normal [Skin Color & Pigmentation] : normal skin color and pigmentation [Skin Turgor] : normal skin turgor [] : no rash

## 2020-12-03 NOTE — HISTORY OF PRESENT ILLNESS
[FreeTextEntry1] : He is 34-year-old patient coming here for evaluation for bilateral papilledema with possible pseudotumor cerebri seen and evaluated in the emergency room on 11/11/20 after seen in ophthalmology office for incidental finding of papilledema seen in optometrist  office.\par Patient was seen and evaluated by sight M.D. and referred to [facility] emergency room where patient was evaluated with CT scan with CT venogram  reported negative and attempted lumbar puncture which failed. Patient refused to wait to be seen by neurology and referred to office visit.\par Patient denies of any headaches, dizziness, focal weakness, visual disturbance. Obesity since childhood not on any medications currently. Works in the hospital employed full time. Without any difficulties.

## 2020-12-23 ENCOUNTER — RESULT REVIEW (OUTPATIENT)
Age: 35
End: 2020-12-23

## 2020-12-23 ENCOUNTER — APPOINTMENT (OUTPATIENT)
Dept: MRI IMAGING | Facility: CLINIC | Age: 35
End: 2020-12-23
Payer: COMMERCIAL

## 2020-12-23 ENCOUNTER — OUTPATIENT (OUTPATIENT)
Dept: OUTPATIENT SERVICES | Facility: HOSPITAL | Age: 35
LOS: 1 days | End: 2020-12-23
Payer: COMMERCIAL

## 2020-12-23 DIAGNOSIS — H47.10 UNSPECIFIED PAPILLEDEMA: ICD-10-CM

## 2020-12-23 PROCEDURE — 70553 MRI BRAIN STEM W/O & W/DYE: CPT | Mod: 26

## 2020-12-23 PROCEDURE — 70544 MR ANGIOGRAPHY HEAD W/O DYE: CPT | Mod: 26,59

## 2020-12-23 PROCEDURE — 70544 MR ANGIOGRAPHY HEAD W/O DYE: CPT

## 2020-12-23 PROCEDURE — A9585: CPT

## 2020-12-23 PROCEDURE — 70553 MRI BRAIN STEM W/O & W/DYE: CPT

## 2020-12-26 ENCOUNTER — OUTPATIENT (OUTPATIENT)
Dept: OUTPATIENT SERVICES | Facility: HOSPITAL | Age: 35
LOS: 1 days | End: 2020-12-26
Payer: COMMERCIAL

## 2020-12-26 DIAGNOSIS — Z11.59 ENCOUNTER FOR SCREENING FOR OTHER VIRAL DISEASES: ICD-10-CM

## 2020-12-26 LAB — SARS-COV-2 RNA SPEC QL NAA+PROBE: SIGNIFICANT CHANGE UP

## 2020-12-26 PROCEDURE — U0003: CPT

## 2020-12-27 DIAGNOSIS — Z11.59 ENCOUNTER FOR SCREENING FOR OTHER VIRAL DISEASES: ICD-10-CM

## 2020-12-27 DIAGNOSIS — Z20.828 CONTACT WITH AND (SUSPECTED) EXPOSURE TO OTHER VIRAL COMMUNICABLE DISEASES: ICD-10-CM

## 2020-12-29 ENCOUNTER — RESULT REVIEW (OUTPATIENT)
Age: 35
End: 2020-12-29

## 2020-12-29 ENCOUNTER — OUTPATIENT (OUTPATIENT)
Dept: OUTPATIENT SERVICES | Facility: HOSPITAL | Age: 35
LOS: 1 days | Discharge: HOME | End: 2020-12-29
Payer: COMMERCIAL

## 2020-12-29 VITALS
SYSTOLIC BLOOD PRESSURE: 118 MMHG | DIASTOLIC BLOOD PRESSURE: 69 MMHG | RESPIRATION RATE: 18 BRPM | HEART RATE: 101 BPM | TEMPERATURE: 98 F | OXYGEN SATURATION: 98 %

## 2020-12-29 VITALS
WEIGHT: 315 LBS | TEMPERATURE: 99 F | DIASTOLIC BLOOD PRESSURE: 95 MMHG | RESPIRATION RATE: 16 BRPM | OXYGEN SATURATION: 97 % | HEART RATE: 100 BPM | SYSTOLIC BLOOD PRESSURE: 149 MMHG | HEIGHT: 70 IN

## 2020-12-29 DIAGNOSIS — H47.10 UNSPECIFIED PAPILLEDEMA: ICD-10-CM

## 2020-12-29 DIAGNOSIS — G93.2 BENIGN INTRACRANIAL HYPERTENSION: ICD-10-CM

## 2020-12-29 LAB
ANION GAP SERPL CALC-SCNC: 3 MMOL/L — LOW (ref 5–17)
BUN SERPL-MCNC: 14 MG/DL — SIGNIFICANT CHANGE UP (ref 7–23)
CALCIUM SERPL-MCNC: 8.9 MG/DL — SIGNIFICANT CHANGE UP (ref 8.5–10.1)
CHLORIDE SERPL-SCNC: 106 MMOL/L — SIGNIFICANT CHANGE UP (ref 96–108)
CO2 SERPL-SCNC: 31 MMOL/L — SIGNIFICANT CHANGE UP (ref 22–31)
CREAT SERPL-MCNC: 1.2 MG/DL — SIGNIFICANT CHANGE UP (ref 0.5–1.3)
GLUCOSE SERPL-MCNC: 95 MG/DL — SIGNIFICANT CHANGE UP (ref 70–99)
HCT VFR BLD CALC: 47.5 % — SIGNIFICANT CHANGE UP (ref 39–50)
HGB BLD-MCNC: 14.3 G/DL — SIGNIFICANT CHANGE UP (ref 13–17)
INR BLD: 1.01 RATIO — SIGNIFICANT CHANGE UP (ref 0.88–1.16)
MCHC RBC-ENTMCNC: 29.1 PG — SIGNIFICANT CHANGE UP (ref 27–34)
MCHC RBC-ENTMCNC: 30.1 GM/DL — LOW (ref 32–36)
MCV RBC AUTO: 96.7 FL — SIGNIFICANT CHANGE UP (ref 80–100)
PLATELET # BLD AUTO: 232 K/UL — SIGNIFICANT CHANGE UP (ref 150–400)
POTASSIUM SERPL-MCNC: 3.7 MMOL/L — SIGNIFICANT CHANGE UP (ref 3.5–5.3)
POTASSIUM SERPL-SCNC: 3.7 MMOL/L — SIGNIFICANT CHANGE UP (ref 3.5–5.3)
PROTHROM AB SERPL-ACNC: 11.8 SEC — SIGNIFICANT CHANGE UP (ref 10.6–13.6)
RBC # BLD: 4.91 M/UL — SIGNIFICANT CHANGE UP (ref 4.2–5.8)
RBC # FLD: 13.2 % — SIGNIFICANT CHANGE UP (ref 10.3–14.5)
SODIUM SERPL-SCNC: 140 MMOL/L — SIGNIFICANT CHANGE UP (ref 135–145)
WBC # BLD: 8.7 K/UL — SIGNIFICANT CHANGE UP (ref 3.8–10.5)
WBC # FLD AUTO: 8.7 K/UL — SIGNIFICANT CHANGE UP (ref 3.8–10.5)

## 2020-12-29 PROCEDURE — 89051 BODY FLUID CELL COUNT: CPT

## 2020-12-29 PROCEDURE — 36415 COLL VENOUS BLD VENIPUNCTURE: CPT

## 2020-12-29 PROCEDURE — 87483 CNS DNA AMP PROBE TYPE 12-25: CPT

## 2020-12-29 PROCEDURE — 87102 FUNGUS ISOLATION CULTURE: CPT

## 2020-12-29 PROCEDURE — 87070 CULTURE OTHR SPECIMN AEROBIC: CPT

## 2020-12-29 PROCEDURE — 80048 BASIC METABOLIC PNL TOTAL CA: CPT

## 2020-12-29 PROCEDURE — 86618 LYME DISEASE ANTIBODY: CPT

## 2020-12-29 PROCEDURE — 84166 PROTEIN E-PHORESIS/URINE/CSF: CPT

## 2020-12-29 PROCEDURE — 82945 GLUCOSE OTHER FLUID: CPT

## 2020-12-29 PROCEDURE — 83916 OLIGOCLONAL BANDS: CPT

## 2020-12-29 PROCEDURE — 62328 DX LMBR SPI PNXR W/FLUOR/CT: CPT

## 2020-12-29 PROCEDURE — 85027 COMPLETE CBC AUTOMATED: CPT

## 2020-12-29 PROCEDURE — 85610 PROTHROMBIN TIME: CPT

## 2020-12-29 PROCEDURE — 83873 ASSAY OF CSF PROTEIN: CPT

## 2020-12-29 PROCEDURE — 84157 ASSAY OF PROTEIN OTHER: CPT

## 2020-12-29 NOTE — ASU PATIENT PROFILE, ADULT - PMH
Atrial fibrillation  paroxysmal  HTN (hypertension)     Atrial fibrillation  paroxysmal  Chronic sinusitis    HTN (hypertension)    Morbid obesity    Papilledema of both eyes

## 2020-12-31 ENCOUNTER — OUTPATIENT (OUTPATIENT)
Dept: OUTPATIENT SERVICES | Facility: HOSPITAL | Age: 35
LOS: 1 days | End: 2020-12-31

## 2020-12-31 DIAGNOSIS — G93.2 BENIGN INTRACRANIAL HYPERTENSION: ICD-10-CM

## 2020-12-31 DIAGNOSIS — H47.10 UNSPECIFIED PAPILLEDEMA: ICD-10-CM

## 2020-12-31 PROBLEM — J32.9 CHRONIC SINUSITIS, UNSPECIFIED: Chronic | Status: ACTIVE | Noted: 2020-12-29

## 2020-12-31 PROBLEM — E66.01 MORBID (SEVERE) OBESITY DUE TO EXCESS CALORIES: Chronic | Status: ACTIVE | Noted: 2020-12-29

## 2021-01-01 DIAGNOSIS — G93.2 BENIGN INTRACRANIAL HYPERTENSION: ICD-10-CM

## 2021-01-01 DIAGNOSIS — H47.10 UNSPECIFIED PAPILLEDEMA: ICD-10-CM

## 2021-01-04 ENCOUNTER — EMERGENCY (EMERGENCY)
Facility: HOSPITAL | Age: 36
LOS: 0 days | Discharge: HOME | End: 2021-01-04
Attending: STUDENT IN AN ORGANIZED HEALTH CARE EDUCATION/TRAINING PROGRAM
Payer: COMMERCIAL

## 2021-01-04 VITALS
DIASTOLIC BLOOD PRESSURE: 76 MMHG | HEART RATE: 96 BPM | SYSTOLIC BLOOD PRESSURE: 151 MMHG | TEMPERATURE: 98 F | RESPIRATION RATE: 18 BRPM | OXYGEN SATURATION: 98 %

## 2021-01-04 VITALS — WEIGHT: 315 LBS | HEIGHT: 70 IN

## 2021-01-04 DIAGNOSIS — R51.9 HEADACHE, UNSPECIFIED: ICD-10-CM

## 2021-01-04 DIAGNOSIS — F17.200 NICOTINE DEPENDENCE, UNSPECIFIED, UNCOMPLICATED: ICD-10-CM

## 2021-01-04 DIAGNOSIS — M54.9 DORSALGIA, UNSPECIFIED: ICD-10-CM

## 2021-01-04 DIAGNOSIS — I48.0 PAROXYSMAL ATRIAL FIBRILLATION: ICD-10-CM

## 2021-01-04 DIAGNOSIS — I10 ESSENTIAL (PRIMARY) HYPERTENSION: ICD-10-CM

## 2021-01-04 LAB
ALBUMIN SERPL ELPH-MCNC: 3.5 G/DL — SIGNIFICANT CHANGE UP (ref 3.3–5)
ALP SERPL-CCNC: 103 U/L — SIGNIFICANT CHANGE UP (ref 40–120)
ALT FLD-CCNC: 46 U/L — SIGNIFICANT CHANGE UP (ref 12–78)
ANION GAP SERPL CALC-SCNC: 3 MMOL/L — LOW (ref 5–17)
APTT BLD: 32.4 SEC — SIGNIFICANT CHANGE UP (ref 27.5–35.5)
AST SERPL-CCNC: 27 U/L — SIGNIFICANT CHANGE UP (ref 15–37)
BILIRUB SERPL-MCNC: 0.6 MG/DL — SIGNIFICANT CHANGE UP (ref 0.2–1.2)
BUN SERPL-MCNC: 16 MG/DL — SIGNIFICANT CHANGE UP (ref 7–23)
CALCIUM SERPL-MCNC: 8.6 MG/DL — SIGNIFICANT CHANGE UP (ref 8.5–10.1)
CHLORIDE SERPL-SCNC: 107 MMOL/L — SIGNIFICANT CHANGE UP (ref 96–108)
CO2 SERPL-SCNC: 30 MMOL/L — SIGNIFICANT CHANGE UP (ref 22–31)
CREAT SERPL-MCNC: 1.28 MG/DL — SIGNIFICANT CHANGE UP (ref 0.5–1.3)
GLUCOSE SERPL-MCNC: 107 MG/DL — HIGH (ref 70–99)
HCT VFR BLD CALC: 50 % — SIGNIFICANT CHANGE UP (ref 39–50)
HGB BLD-MCNC: 15.2 G/DL — SIGNIFICANT CHANGE UP (ref 13–17)
INR BLD: 0.98 RATIO — SIGNIFICANT CHANGE UP (ref 0.88–1.16)
MCHC RBC-ENTMCNC: 29.3 PG — SIGNIFICANT CHANGE UP (ref 27–34)
MCHC RBC-ENTMCNC: 30.4 GM/DL — LOW (ref 32–36)
MCV RBC AUTO: 96.3 FL — SIGNIFICANT CHANGE UP (ref 80–100)
PLATELET # BLD AUTO: 253 K/UL — SIGNIFICANT CHANGE UP (ref 150–400)
POTASSIUM SERPL-MCNC: 4.5 MMOL/L — SIGNIFICANT CHANGE UP (ref 3.5–5.3)
POTASSIUM SERPL-SCNC: 4.5 MMOL/L — SIGNIFICANT CHANGE UP (ref 3.5–5.3)
PROT SERPL-MCNC: 7.8 GM/DL — SIGNIFICANT CHANGE UP (ref 6–8.3)
PROTHROM AB SERPL-ACNC: 11.5 SEC — SIGNIFICANT CHANGE UP (ref 10.6–13.6)
RBC # BLD: 5.19 M/UL — SIGNIFICANT CHANGE UP (ref 4.2–5.8)
RBC # FLD: 13.2 % — SIGNIFICANT CHANGE UP (ref 10.3–14.5)
SODIUM SERPL-SCNC: 140 MMOL/L — SIGNIFICANT CHANGE UP (ref 135–145)
WBC # BLD: 9.7 K/UL — SIGNIFICANT CHANGE UP (ref 3.8–10.5)
WBC # FLD AUTO: 9.7 K/UL — SIGNIFICANT CHANGE UP (ref 3.8–10.5)

## 2021-01-04 PROCEDURE — 85730 THROMBOPLASTIN TIME PARTIAL: CPT

## 2021-01-04 PROCEDURE — 99284 EMERGENCY DEPT VISIT MOD MDM: CPT

## 2021-01-04 PROCEDURE — 36415 COLL VENOUS BLD VENIPUNCTURE: CPT

## 2021-01-04 PROCEDURE — 99283 EMERGENCY DEPT VISIT LOW MDM: CPT

## 2021-01-04 PROCEDURE — 85027 COMPLETE CBC AUTOMATED: CPT

## 2021-01-04 PROCEDURE — 80053 COMPREHEN METABOLIC PANEL: CPT

## 2021-01-04 PROCEDURE — 85610 PROTHROMBIN TIME: CPT

## 2021-01-04 RX ORDER — SODIUM CHLORIDE 9 MG/ML
1000 INJECTION INTRAMUSCULAR; INTRAVENOUS; SUBCUTANEOUS ONCE
Refills: 0 | Status: COMPLETED | OUTPATIENT
Start: 2021-01-04 | End: 2021-01-04

## 2021-01-04 RX ORDER — ACETAMINOPHEN 500 MG
1000 TABLET ORAL ONCE
Refills: 0 | Status: COMPLETED | OUTPATIENT
Start: 2021-01-04 | End: 2021-01-04

## 2021-01-04 RX ADMIN — Medication 1000 MILLIGRAM(S): at 20:56

## 2021-01-04 RX ADMIN — SODIUM CHLORIDE 1000 MILLILITER(S): 9 INJECTION INTRAMUSCULAR; INTRAVENOUS; SUBCUTANEOUS at 20:53

## 2021-01-04 NOTE — ED STATDOCS - NS_ ATTENDINGSCRIBEDETAILS _ED_A_ED_FT
I, Du Lutz DO,  performed the initial face to face bedside interview with this patient regarding history of present illness, review of symptoms and relevant past medical, social and family history. I completed an independent physical examination. I was the initial provider who evaluated this patient.    The history, relevant review of systems, past medical and surgical history, medical decision making, and physical examination was documented by the scribe in my presence and I attest to the accuracy of the documentation.

## 2021-01-04 NOTE — ED STATDOCS - OBJECTIVE STATEMENT
34 y/o male with a PMHx of paroxysmal Afib, HTN, presents to the ED c/o back pain x1 week. Pt s/p lumbar puncture on 12/29/2020 performed by IR at [facility] for optic nerve problem. Pt reports back pain and intermittent headache since the procedure. Has been taking Tylenol for pain without relief, last took 6.5 hours PTA. Denies fever, chills, nausea, vomiting, numbness, bladder/bowel incontinence. No other complaints at this time. NKDA. PCP: Dr. Parish Jaquez. 36 y/o male with a PMHx of paroxysmal Afib, HTN, presents to the ED c/o back pain x1 week. Pt s/p lumbar puncture on 12/29/2020 performed by Dr. Berg (Neuro-radiology)at [facility] for optic nerve problem. Pt reports back pain and intermittent headache since the procedure. Has been taking Tylenol for pain without relief, last took 6.5 hours PTA. Denies fever, chills, nausea, vomiting, numbness, bladder/bowel incontinence. No other complaints at this time. NKDA. PCP: Dr. Parish Jaquez.

## 2021-01-04 NOTE — ED STATDOCS - PHYSICAL EXAMINATION
Vital signs as available reviewed.  General:  Comfortable, no acute distress.  Head:  Normocephalic, atraumatic.  Eyes:  Conjunctiva pink, no icterus.  Cardiovascular:  Regular rate, no obvious murmur.  Respiratory:  Clear to auscultation, good air entry bilaterally.  Abdomen:  Soft, non-tender.  Musculoskeletal: +Midline lumbar tenderness, no redness of skin, no step-off. No deformity or calf tenderness.  Neurologic: Alert and oriented, moving all extremities. Lower extremities and upper extremities 5/5 strength   Skin:  Warm and dry. Vital signs as available reviewed.  General:  Comfortable, no acute distress.  Head:  Normocephalic, atraumatic.  Eyes:  Conjunctiva pink, no icterus. Pupils equal, round, reactive to light.  Cardiovascular:  Regular rate, no obvious murmur.  Respiratory:  Clear to auscultation, good air entry bilaterally.  Abdomen:  Soft, non-tender.  Musculoskeletal: +Midline lumbar tenderness, no swelling / redness of skin, no step-off.   Neurologic: Alert and oriented, moving all extremities. Lower extremities and upper extremities 5/5 strength. CN 2-12 intact.  Skin:  Warm and dry.

## 2021-01-04 NOTE — ED ADULT NURSE NOTE - PMH
Atrial fibrillation  paroxysmal  Chronic sinusitis    HTN (hypertension)    Morbid obesity    Papilledema of both eyes

## 2021-01-04 NOTE — ED STATDOCS - PROGRESS NOTE DETAILS
Patient initially seen and evaluated with ED attending at intake.  Reporting HA since LP on 12/29/2020 by neurorad Dr. Berg.  case d/w on call neuro rad Dr. Manley who recommended rest, fluids, tylenol PRN pain and that he should be following up outpatient with Dr. Blair to determine if he has CSF leak or if it pseudotumor causing HA.  He can have a repeat myelogram on an outpatient basis as per Dr. Manley.  Attempted to contact Dr. Blair to facilitate his follow up and discuss further, was unable to reach.  Reviewed plan, management, follow up and return precautions with patient who has improvement of symptoms s/p IVF and tylenol.  He has an appt scheduled with Dr. Blair on 1/12.  -Lauren Vera PA-C

## 2021-01-04 NOTE — ED STATDOCS - PATIENT PORTAL LINK FT
You can access the FollowMyHealth Patient Portal offered by [facility] by registering at the following website: [email or web address]. By joining [employer]’s FollowMyHealth portal, you will also be able to view your health information using other applications (apps) compatible with our system.

## 2021-01-04 NOTE — ED STATDOCS - NSFOLLOWUPINSTRUCTIONS_ED_ALL_ED_FT
Lumbar Puncture    A lumbar puncture, also called a spinal tap, is a procedure that is done to remove a small amount of the fluid that surrounds the brain and spinal cord (cerebrospinal fluid, CSF). The fluid is then examined in the lab. This procedure may be done to:  •Help diagnose various problems, such as meningitis, encephalitis, multiple sclerosis, and other infections.      •Remove fluid and relieve pressure that occurs with certain types of headaches.      •Look for bleeding within the brain and spinal cord areas (central nervous system).      •Place medicine into the spinal fluid.        Tell a health care provider about:    •Any allergies you have.      •All medicines you are taking, including vitamins, herbs, eye drops, creams, and over-the-counter medicines like aspirin or NSAIDs.      •Any problems you or family members have had with anesthetic medicines.      •Any blood disorders you have.      •Any surgeries you have had.      •Any medical conditions you have.      •Whether you are pregnant or may be pregnant.        What are the risks?  Generally, this is a safe procedure. However, problems may occur, including:  •Infection at the insertion site that can spread to the bone or spinal fluid.      •Bleeding.      •Spinal headache. This is a severe headache that occurs when there is a leak of spinal fluid.      •Leakage of CSF.      •Allergic reactions to medicines or dyes.      •Damage to other structures or organs.        What happens before the procedure?    Staying hydrated   Follow instructions from your health care provider about hydration, which may include:  •Up to 2 hours before the procedure – you may continue to drink clear liquids, such as water, clear fruit juice, black coffee, and plain tea.      Eating and drinking restrictions  Follow instructions from your health care provider about eating and drinking. If you will be given a medicine to help you relax (sedative), these instructions may include:  •8 hours before the procedure – stop eating heavy meals or foods such as meat, fried foods, or fatty foods.      •6 hours before the procedure – stop eating light meals or foods, such as toast or cereal.      •6 hours before the procedure – stop drinking milk or drinks that contain milk.      •2 hours before the procedure – stop drinking clear liquids.      Medicines  •Ask your health care provider about:  •Changing or stopping your regular medicines. This is especially important if you are taking diabetes medicines or blood thinners.      •Taking medicines such as aspirin and ibuprofen. These medicines can thin your blood. Do not take these medicines before your procedure if your health care provider instructs you not to.        •You may be given antibiotic medicine to help prevent infection. If so, take the antibiotic as told by your health care provider.      General instructions    •You may have a blood sample taken.      •You may be asked to shower with a germ-killing soap.      •Ask your health care provider how your surgical site will be marked or identified.      •Plan to have someone take you home from the hospital or clinic. This is especially important if you will be given a sedative.      •If you will be going home right after the procedure, plan to have someone with you for 24 hours.        What happens during the procedure?   •You may lie down on your side with your knees bent, or you may sit with your head resting on a pillow on your lap.  •How you are positioned depends on your age and size.      •You will be positioned so that the spaces between the bones of the spine (vertebrae) are as wide as possible. This will make it easier to pass the needle into the spinal canal.        •The skin on your lower back (lumbar region) will be cleaned.      •You will be given an injection of medicine to numb your lower back area (local anesthetic).      •You may be given pain medicine or a sedative.      •A small needle will be inserted into your lower back until it enters the space that contains the cerebrospinal fluid. The needle will not enter the spinal cord.      •Fluid will be collected into tubes. It will be sent to a lab for examination.      •The needle will be withdrawn, and a bandage (dressing) will be placed over the area.      The procedure may vary among health care providers and hospitals.      What happens after the procedure?    •Your blood pressure, heart rate, breathing rate, and blood oxygen level will be monitored until any medicines you were given have worn off.      •You may need to stay lying down for a while.      •You will need to drink plenty of fluids and caffeine to help prevent a headache.      • Do not drive for 24 hours if you received a sedative.        Summary    •A lumbar puncture, also called a spinal tap, is a procedure that is done to remove a small amount of the cerebrospinal fluid, CSF. This may be done to help diagnose a wide variety of conditions.      •Before the procedure, tell your health care provider about all medicines you are taking, including vitamins, herbs, eye drops, creams, and over-the-counter medicines.      •Before the procedure, ask your health care provider about changing or stopping your regular medicines. This is especially important if you are taking blood thinners.      •Your lower back will be numbed with an injection before the needle is placed into your spinal canal.      •After the procedure, you will lie down for a while and you will drink plenty of fluids.      This information is not intended to replace advice given to you by your health care provider. Make sure you discuss any questions you have with your health care provider.

## 2021-01-04 NOTE — ED STATDOCS - NS ED ROS FT
Constitutional: No fever.  Eyes: No vision changes.   Ears, Nose, Mouth, Throat: No sore throat.  Cardiovascular: No chest pain.  Respiratory: No difficulty breathing.  Gastrointestinal: No nausea or vomiting.  Genitourinary: No dysuria.   Musculoskeletal: +back pain  Integumentary (skin and/or breast): No rash.  Neurological: +headache. No bladder or bowel incontinence.   Psychiatric: No depression.  Endocrine:  No heat / cold intolerance.  Hematologic/Lymphatic: No easy bruising   Allergic/Immunologic:  No current allergic reactions.

## 2021-01-04 NOTE — ED STATDOCS - CLINICAL SUMMARY MEDICAL DECISION MAKING FREE TEXT BOX
Here with headache / back pain after LP by IR. May need blood patch. Check labs, get consultation with Dr. Berg (Neuro-radiology), treat pain.

## 2021-01-12 ENCOUNTER — APPOINTMENT (OUTPATIENT)
Dept: NEUROLOGY | Facility: CLINIC | Age: 36
End: 2021-01-12
Payer: COMMERCIAL

## 2021-01-12 VITALS
HEART RATE: 102 BPM | BODY MASS INDEX: 45.1 KG/M2 | DIASTOLIC BLOOD PRESSURE: 95 MMHG | HEIGHT: 70 IN | SYSTOLIC BLOOD PRESSURE: 165 MMHG | TEMPERATURE: 98.2 F | WEIGHT: 315 LBS

## 2021-01-12 DIAGNOSIS — H47.10 UNSPECIFIED PAPILLEDEMA: ICD-10-CM

## 2021-01-12 DIAGNOSIS — G93.2 BENIGN INTRACRANIAL HYPERTENSION: ICD-10-CM

## 2021-01-12 DIAGNOSIS — E23.7 DISORDER OF PITUITARY GLAND, UNSPECIFIED: ICD-10-CM

## 2021-01-12 PROCEDURE — 99072 ADDL SUPL MATRL&STAF TM PHE: CPT

## 2021-01-12 PROCEDURE — 99214 OFFICE O/P EST MOD 30 MIN: CPT

## 2021-01-12 NOTE — REVIEW OF SYSTEMS
[Negative] : Heme/Lymph [FreeTextEntry2] : Obesity [de-identified] : Incidental Papilledema noted on eye exam

## 2021-01-12 NOTE — REASON FOR VISIT
[Follow-Up: _____] : a [unfilled] follow-up visit [FreeTextEntry1] : Follow up for pt with Pseudo tumor and follow up

## 2021-01-12 NOTE — DISCUSSION/SUMMARY
[FreeTextEntry1] : He said he could-year-old patient with a history of incidental edema with pseudotumor cerebri with elevated opening pressure on lumbar puncture\par Consistent with clinical diagnosis of pseudotumor cerebri.\par Patient has no clinical symptoms of headaches or visual disturbance.\par Incidental visual findings seen on ophthalmology evaluation.\par Patient has markedly obese and noted to have elevated blood pressure.\par Recommend patient to be seen by medicine.\par Need for any medications for high blood pressure.\par Will consider starting him on acetazolamide for pseudotumor.\par Repeat MRI of brain attention to pituitary gland.\par Weight reduction strongly recommended.\par Patient education provided and discussed with the patient.\par Followup evaluation in one month.\par Follow up with neuro ophthalmology.\par

## 2021-01-12 NOTE — DATA REVIEWED
[de-identified] : atypical appearance of pituitary gland ? left side lesion can not be excluded. partial empty sella.otherwise empty sella [de-identified] : I [de-identified] : MRA neg\par MRV neg\par LP opening pressure 540 mm\par closing pressure 100 mm

## 2021-01-12 NOTE — PROCEDURE
[FreeTextEntry1] : CSF Glucose 67\par Protein 24\par cell count \par Tube 3 \par SEg not detected\par IGG 2.3 tGG index 0.5\par Myelin basic protein 1.9\par csf c/s no growth

## 2021-01-12 NOTE — HISTORY OF PRESENT ILLNESS
[FreeTextEntry1] : He is 35 year-old patient coming here for followup evaluation for bilateral papilledema with a diagnosis of pseudotumor cerebri seen in [facility] emergency room and subsequently ophthalmology workup including CT, CT venogram and MRI with MR angiogram and MR venogram negative for acute pathology.\par Patient underwent lumbar puncture since last visit opening pressure was 580 mm and CSF studies were negative for any inflammatory or infectious pathology. Patient developed post lumbar puncture headache which resolved completely now. Noted to have elevated blood pressure seen in emergency room and today's examination.\par Patient is markedly obese and recommended followup evaluation with PCP for weight reduction and evaluation for elevated blood pressure.\par No other new complaints neurologically. No followup with ophthalmology was done.\par No focal complaints no headache currently.

## 2021-01-12 NOTE — PHYSICAL EXAM
[FreeTextEntry1] : Obese [Oriented To Time, Place, And Person] : oriented to person, place, and time [Impaired Insight] : insight and judgment were intact [Affect] : the affect was normal [Person] : oriented to person [Place] : oriented to place [Time] : oriented to time [Concentration Intact] : normal concentrating ability [Visual Intact] : visual attention was ~T not ~L decreased [Naming Objects] : no difficulty naming common objects [Repeating Phrases] : no difficulty repeating a phrase [Writing A Sentence] : no difficulty writing a sentence [Fluency] : fluency intact [Comprehension] : comprehension intact [Reading] : reading intact [Past History] : adequate knowledge of personal past history [Cranial Nerves Oculomotor (III)] : extraocular motion intact [Cranial Nerves Trigeminal (V)] : facial sensation intact symmetrically [Cranial Nerves Facial (VII)] : face symmetrical [Cranial Nerves Vestibulocochlear (VIII)] : hearing was intact bilaterally [Cranial Nerves Glossopharyngeal (IX)] : tongue and palate midline [Cranial Nerves Hypoglossal (XII)] : there was no tongue deviation with protrusion [Cranial Nerves Accessory (XI - Cranial And Spinal)] : head turning and shoulder shrug symmetric [Motor Strength] : muscle strength was normal in all four extremities [No Muscle Atrophy] : normal bulk in all four extremities [Sensation Tactile Decrease] : light touch was intact [Balance] : balance was intact [Past-pointing] : there was no past-pointing [Tremor] : no tremor present [2+] : Brachioradialis left 2+ [1+] : Ankle jerk left 1+ [Plantar Reflex Right Only] : normal on the right [Plantar Reflex Left Only] : normal on the left [FreeTextEntry5] : Bilat discrete blurring of disc margins  [Sclera] : the sclera and conjunctiva were normal [PERRL With Normal Accommodation] : pupils were equal in size, round, reactive to light, with normal accommodation [Extraocular Movements] : extraocular movements were intact [Outer Ear] : the ears and nose were normal in appearance [Oropharynx] : the oropharynx was normal [Neck Appearance] : the appearance of the neck was normal [Neck Cervical Mass (___cm)] : no neck mass was observed [Jugular Venous Distention Increased] : there was no jugular-venous distention [Thyroid Diffuse Enlargement] : the thyroid was not enlarged [Thyroid Nodule] : there were no palpable thyroid nodules [Auscultation Breath Sounds / Voice Sounds] : lungs were clear to auscultation bilaterally [Heart Sounds] : normal S1 and S2 [Heart Rate And Rhythm] : heart rate was normal and rhythm regular [Heart Sounds Gallop] : no gallops [Murmurs] : no murmurs [Heart Sounds Pericardial Friction Rub] : no pericardial rub [Full Pulse] : the pedal pulses are present [Edema] : there was no peripheral edema [Bowel Sounds] : normal bowel sounds [Abdomen Soft] : soft [Abdomen Tenderness] : non-tender [Abdomen Mass (___ Cm)] : no abdominal mass palpated [No CVA Tenderness] : no ~M costovertebral angle tenderness [No Spinal Tenderness] : no spinal tenderness [Abnormal Walk] : normal gait [Nail Clubbing] : no clubbing  or cyanosis of the fingernails [Motor Tone] : muscle strength and tone were normal [Musculoskeletal - Swelling] : no joint swelling seen [Skin Color & Pigmentation] : normal skin color and pigmentation [Skin Turgor] : normal skin turgor [] : no rash

## 2021-02-11 ENCOUNTER — APPOINTMENT (OUTPATIENT)
Dept: NEUROLOGY | Facility: CLINIC | Age: 36
End: 2021-02-11

## 2021-04-14 ENCOUNTER — OUTPATIENT (OUTPATIENT)
Dept: OUTPATIENT SERVICES | Facility: HOSPITAL | Age: 36
LOS: 1 days | End: 2021-04-14
Payer: COMMERCIAL

## 2021-04-14 DIAGNOSIS — Z20.828 CONTACT WITH AND (SUSPECTED) EXPOSURE TO OTHER VIRAL COMMUNICABLE DISEASES: ICD-10-CM

## 2021-04-14 LAB — SARS-COV-2 RNA SPEC QL NAA+PROBE: SIGNIFICANT CHANGE UP

## 2021-04-14 PROCEDURE — C9803: CPT

## 2021-04-14 PROCEDURE — U0003: CPT

## 2021-04-14 PROCEDURE — U0005: CPT

## 2021-04-15 DIAGNOSIS — Z20.828 CONTACT WITH AND (SUSPECTED) EXPOSURE TO OTHER VIRAL COMMUNICABLE DISEASES: ICD-10-CM

## 2021-05-19 NOTE — ED ADULT NURSE NOTE - NSFALLRSKASSESSTYPE_ED_ALL_ED
Problem: Diabetes Self-Management  Goal: *Disease process and treatment process  Description: Define diabetes and identify own type of diabetes; list 3 options for treating diabetes. Outcome: Progressing Towards Goal  Goal: *Incorporating nutritional management into lifestyle  Description: Describe effect of type, amount and timing of food on blood glucose; list 3 methods for planning meals. Outcome: Progressing Towards Goal  Goal: *Incorporating physical activity into lifestyle  Description: State effect of exercise on blood glucose levels. Outcome: Progressing Towards Goal  Goal: *Developing strategies to promote health/change behavior  Description: Define the ABC's of diabetes; identify appropriate screenings, schedule and personal plan for screenings. Outcome: Progressing Towards Goal  Goal: *Using medications safely  Description: State effect of diabetes medications on diabetes; name diabetes medication taking, action and side effects. Outcome: Progressing Towards Goal  Goal: *Monitoring blood glucose, interpreting and using results  Description: Identify recommended blood glucose targets  and personal targets. Outcome: Progressing Towards Goal  Goal: *Prevention, detection, treatment of acute complications  Description: List symptoms of hyper- and hypoglycemia; describe how to treat low blood sugar and actions for lowering  high blood glucose level. Outcome: Progressing Towards Goal  Goal: *Prevention, detection and treatment of chronic complications  Description: Define the natural course of diabetes and describe the relationship of blood glucose levels to long term complications of diabetes.   Outcome: Progressing Towards Goal  Goal: *Developing strategies to address psychosocial issues  Description: Describe feelings about living with diabetes; identify support needed and support network  Outcome: Progressing Towards Goal  Goal: *Insulin pump training  Outcome: Progressing Towards Goal  Goal: *Sick day guidelines  Outcome: Progressing Towards Goal  Goal: *Patient Specific Goal (EDIT GOAL, INSERT TEXT)  Outcome: Progressing Towards Goal     Problem: Falls - Risk of  Goal: *Absence of Falls  Description: Document Davon Fall Risk and appropriate interventions in the flowsheet.   Outcome: Progressing Towards Goal  Note: Fall Risk Interventions:  Mobility Interventions: Patient to call before getting OOB         Medication Interventions: Patient to call before getting OOB, Teach patient to arise slowly    Elimination Interventions: Call light in reach, Toilet paper/wipes in reach Initial (On Arrival)

## 2021-09-20 NOTE — ED STATDOCS - NS ED ROS FT
Your CT scan did not show any acute abnormalities. Please see neurology for further management. Thank you.
GEN: Denies fever, chills, sick contacts, recent travel  HEENT: +runny nose, congestion Denies dizziness, blurry vision, HA  Cardiac: Denies CP, SOB, palpitations  Lungs: +cough Denies wheezing  PV: Denies LE swelling  GI: Denies nausea, vomiting, diarrhea, constipation, flank pain  : Denies hematuria, dysuria, frequency, urgency  Skin: Denies rash, redness, open wound  MSK: Denies pain, decreased ROM  Neuro: Denies dizziness, difficulty speaking, difficulty swallowing, numbness/tingling in extremities, loss of bowel/bladder control, weakness in extremities

## 2022-01-08 ENCOUNTER — TRANSCRIPTION ENCOUNTER (OUTPATIENT)
Age: 37
End: 2022-01-08

## 2022-04-06 NOTE — ASU DISCHARGE PLAN (ADULT/PEDIATRIC) - PLEASE INDICATE TEMPERATURE IN FAHRENHEIT OR CELSIUS
Will increase Toprol Xl to 125mg daily  If no improvement and no side effects you can further increase to 150mg daily  If you develop side effects (lightheadness/ fatigue, etc) return to 100mg daily and contact the office  We will then try adding a medication such as primidone or topiramate  100 F

## 2022-04-20 NOTE — ASU PREOP CHECKLIST - NEEDLE GAUGE
April 20, 2022      [facility]  [address] [facility] [place] [address]  Phone: [phone]  Fax: [phone]       Patient: Blaise Bai   YOB: 2017  Date of Visit: 04/20/2022    To Whom It May Concern:    Rashawn Bai  was at [facility] on 04/20/2022. The patient may return to work/school on 04/21/2022 with no restrictions. Parent ( Nieves Cooper) is to return to work 04/21/2022. If you have any questions or concerns, or if I can be of further assistance, please do not hesitate to contact me.    Sincerely,    Napoleon Curtis RN     
20

## 2022-08-25 NOTE — ED STATDOCS - ATTENDING CONTRIBUTION TO CARE
Reason for call:  Patient reporting a symptom    Symptom or request: cough follow up    Duration (how long have symptoms been present): 1 month now    Have you been treated for this before? Yes    Additional comments: Patient would like a call back to discuss lingering cough since having Covid 4 weeks ago. Ok to speak with his wife Karen per patient    Phone Number patient can be reached at:  Home number on file [phone] (home)    Best Time:  Before 9:30 am or after 12:30 pm    Can we leave a detailed message on this number:  YES    Call taken on 8/25/2022 at 8:23 AM by Greta Sotelo     I, Du Lutz DO, personally saw the patient with ACP.  I have personally performed a face to face diagnostic evaluation on this patient and formulated the patient plan. The case was discussed with, and handed off to ACP who followed the case through to the re-evaluation and disposition.

## 2022-10-13 NOTE — ASU PREOP CHECKLIST - SITE MARKED BY SURGEON
n/a Gabapentin Counseling: I discussed with the patient the risks of gabapentin including but not limited to dizziness, somnolence, fatigue and ataxia.

## 2022-10-31 NOTE — ASU PATIENT PROFILE, ADULT - VISION (WITH CORRECTIVE LENSES IF THE PATIENT USUALLY WEARS THEM):
DISPLAY PLAN FREE TEXT
Normal vision: sees adequately in most situations; can see medication labels, newsprint

## 2023-01-31 NOTE — ED ADULT NURSE NOTE - CAS TRG GENERAL AIRWAY, MLM
Svitlana Daily Progress Note      Admit Date:  1/24/2023    Chief Complaint   Patient presents with    Chest Pain     Pt brought in by [facility] from home due to CP that started 3 days ago, SOB started this morning, hx of COPD, presents with cough/wheezing/rhonchi in her lungs, PVCs/bigeminy. Pt states that chest pain goes to her back that is pressure, afebrile, A and O x 4. Subjective:  Ms. Yudy Bryson is being seen for known CAD. denies exertional chest pain, SOB/MUNROE, PND, palpitations, light-headedness, or edema. Patient resting in bed with daughter at bedside during my assessment.  Reported she did not sleep well last night due to being in the hospital.     Objective:   /60   Pulse 75   Temp 97.6 °F (36.4 °C) (Oral)   Resp 22   Ht 5' 5\" (1.651 m)   Wt 274 lb 3.2 oz (124.4 kg)   SpO2 93%   BMI 45.63 kg/m²     Intake/Output Summary (Last 24 hours) at 1/31/2023 0954  Last data filed at 1/31/2023 7479  Gross per 24 hour   Intake 1480 ml   Output 1400 ml   Net 80 ml       TELEMETRY: Sinus     Physical Exam:  General:  Awake, alert, oriented x 3, NAD  Skin:  Warm and dry  Neck:  JVD flat  Chest:  normal air entry  Cardiovascular:  RRR S1S2, no S3, no mrmr  Abdomen:  Soft, ND, NT, No HSM  Extremities:  No edema    Medications:    furosemide  20 mg IntraVENous Once    sodium chloride flush  5-40 mL IntraVENous 2 times per day    rosuvastatin  40 mg Oral Nightly    aspirin  81 mg Oral Daily    clopidogrel  75 mg Oral Daily    metoprolol succinate  25 mg Oral Daily    acetylcysteine  4 mL Inhalation BID    diclofenac sodium  4 g Topical BID    furosemide  20 mg IntraVENous BID    clopidogrel  75 mg Oral Daily    sodium chloride flush  5-40 mL IntraVENous 2 times per day    enoxaparin  1 mg/kg SubCUTAneous BID    ipratropium-albuterol  1 ampule Inhalation 4x daily    isosorbide mononitrate  30 mg Oral BID    aspirin  243 mg Oral Once    aspirin  81 mg Oral Daily    glipiZIDE  5 mg Oral BID AC metoprolol succinate  25 mg Oral Daily    [Held by provider] rivaroxaban  15 mg Oral Daily    lisinopril  2.5 mg Oral Daily    acetaZOLAMIDE  250 mg Oral Daily    gabapentin  600 mg Oral TID    pantoprazole  40 mg Oral QAM AC    sodium chloride flush  5-40 mL IntraVENous 2 times per day    atorvastatin  40 mg Oral Nightly      sodium chloride      sodium chloride      dextrose      sodium chloride      dextrose       sodium chloride flush, sodium chloride, acetaminophen, ondansetron, ibuprofen, sodium chloride flush, sodium chloride, dextrose bolus **OR** dextrose bolus, glucagon (rDNA), dextrose, guaiFENesin-dextromethorphan, sodium chloride, ipratropium-albuterol, albuterol sulfate HFA, ALPRAZolam, sodium chloride flush, sodium chloride, ondansetron **OR** ondansetron, acetaminophen **OR** acetaminophen, magnesium hydroxide, nitroGLYCERIN, hydrALAZINE, sodium chloride, potassium chloride **OR** potassium alternative oral replacement **OR** potassium chloride, dextrose    Lab Data:  CBC:   Recent Labs     01/30/23 0459 01/31/23  0553   WBC 8.6 7.2   HGB 14.0 13.5   HCT 43.8 42.4   MCV 84.1 84.6    159     BMP:   Recent Labs     01/30/23 0459 01/31/23  0553    138   K 3.6 4.0   CL 98* 102   CO2 29 28   BUN 19 16   CREATININE 0.9 0.9     LIVER PROFILE: No results for input(s): AST, ALT, LIPASE, BILIDIR, BILITOT, ALKPHOS in the last 72 hours. Invalid input(s): AMYLASE,  ALB  PT/INR: No results for input(s): PROTIME, INR in the last 72 hours. APTT: No results for input(s): APTT in the last 72 hours. BNP:  No results for input(s): BNP in the last 72 hours. IMAGING:     Cardiac Cath PCI 1/30/2023 Guy Plummer)   Anatomy:      LAD-prox 70-80% ISR, mid 70-80%, distal 90%     RCA-prox 90%  RPDA- nml     Intervention  ~Successful PCI to RCA with 4.0x18 KATHLEEN. PD with 4.0x8 NC. PCI to Distal LAD 2.75x38 KATHLEEN. PCI to Prox/mid LAD 3.25x38 KATHLEEN. Excellent Result.       Contrast: 72  Flouro Time: 11.7  Access: R radial a     Impression  ~Coronary Angiography w/ severe 2VD  ~Successful complex angioplasty and stenting of LAD/RCA     Recommendation  ~Aggressive medical treatment and risk factor modification  ~1. Post cath IVF. Bedrest.   2. Recommend beta blocker, ace, high potency statin, aspirin and plavix for at least 12 months. Stop imdur  3. Referral to outpatient cardiac rehab phase II will be deferred until patient follow-up in office and then determine patient safety and appropriateness to proceed  4. Patient has been advised on the importance of regular exercise of at least 20-30 minutes daily. 5. Patient counseled about and offered assistance for smoking cessation   6. Follow up in 2 weeks with cardiology    [facility] 1/26/2023 Adamaris Shankar  1/26/2023  Left Heart Cath  LM-30% proximal, 10-20% distal disease   LAD-proximal mid stents patent with <20% ISR; 80% ostial jailed second diagonal;  hazy 95% distal LAD   LCx-high first marginal with 80% short, ostial stenosis; mild plaquing in circumflex   RCA-dominant, large caliber with 80% calcified, proximal stenosis; 50% distal RPDA   LVEDP- 20 mmHg  LVG: LVEF 60%     [facility] 8/2016 ([place])  70% ostial D-jailed by stent  LAD stent patent  Mild CX  60-70 prox RCA  Nornal LV FXN  Similar to prior cath 3/4/15--CPT. Cardiac Cath with [facility] 9/2019 [facility]):  LM-Normal No disease   LAD-proximal stent 10% ISR, mid stent patent. Distal LAD 20% irregular stenosis. D2 70% ostial stent longterm  Cx-Patent, mild luminal irregularities  OM1- ostial discrete 60% stenosis  RCA-Dominant, large sized vessel. Proximal 70% heavily calcified stenosis. RPDA- Patent, mild luminal irregularities     FFR of prox RCA 0.84     ECHO 8/2/22   Summary   Technically difficult exam due to body habitus. Normal global systolic function with an ejection fraction estimated at 70%. No regional wall motion abnormalities noted. Mild concentric left ventricular hypertrophy.    Mild aortic stenosis with a peak velocity of 2.17m/s and a mean pressure   gradient of 12 mmHg. There is mild aortic insufficiency. Grade I diastolic dysfunction with normal LV filling pressures. Avg.   E/e'=18.2     SPECT 10/27/22  Summary    There is a mild fixed inferoapical defect with no associated wall motion  abnormality consistent with diaphragmatic attenuation artifact. No evidence of myocardium at risk for significant reversible ischemia. Normal LV size and systolic function. Overall findings represent a low risk scan. Assessment/Plan:  Principal Problem:    Chest pain  Plan: s/p Successful complex angioplasty and stenting of LAD/RCA. Chest pain free this morning. Plavix added, remain on plavix for at least 12 months. No aspirin due to high bleed risk with triple therapy. Afib: PAF.  Resume xarelto  Hyperlipemia  Plan: increased to lipitor 40, recheck lipids as outpt        Primary hypertension  Plan: on Lisinopril / metoprolol   Stable    T2DM  Plan: management per primary team     COPD   On 5L O2, baseline 2L   Titrate down as tolerated    Management per primary team    Discussed plan with bedside RN  Will sign off   Has follow up with my office scheduled in 2 weeks   Call with questions              Amy Hickman MD, 1/31/2023 9:54 AM Patent

## 2023-03-24 NOTE — ED PROVIDER NOTE - CARDIAC, MLM
Apt reminder left on VM
Normal rate, regular rhythm.  Heart sounds S1, S2.  No murmurs, rubs or gallops.

## 2024-10-05 ENCOUNTER — INPATIENT (INPATIENT)
Facility: HOSPITAL | Age: 39
LOS: 0 days | Discharge: HOME | DRG: 189 | End: 2024-10-06
Attending: STUDENT IN AN ORGANIZED HEALTH CARE EDUCATION/TRAINING PROGRAM | Admitting: HOSPITALIST
Payer: COMMERCIAL

## 2024-10-05 ENCOUNTER — RESULT REVIEW (OUTPATIENT)
Age: 39
End: 2024-10-05

## 2024-10-05 VITALS — WEIGHT: 315 LBS | HEIGHT: 70 IN

## 2024-10-05 DIAGNOSIS — J96.01 ACUTE RESPIRATORY FAILURE WITH HYPOXIA: ICD-10-CM

## 2024-10-05 LAB
ALBUMIN SERPL ELPH-MCNC: 3.2 G/DL — LOW (ref 3.3–5)
ALP SERPL-CCNC: 84 U/L — SIGNIFICANT CHANGE UP (ref 40–120)
ALT FLD-CCNC: 40 U/L — SIGNIFICANT CHANGE UP (ref 12–78)
ANION GAP SERPL CALC-SCNC: -2 MMOL/L — LOW (ref 5–17)
AST SERPL-CCNC: 21 U/L — SIGNIFICANT CHANGE UP (ref 15–37)
BASOPHILS # BLD AUTO: 0.01 K/UL — SIGNIFICANT CHANGE UP (ref 0–0.2)
BASOPHILS NFR BLD AUTO: 0.1 % — SIGNIFICANT CHANGE UP (ref 0–2)
BILIRUB SERPL-MCNC: 0.7 MG/DL — SIGNIFICANT CHANGE UP (ref 0.2–1.2)
BUN SERPL-MCNC: 15 MG/DL — SIGNIFICANT CHANGE UP (ref 7–23)
CALCIUM SERPL-MCNC: 8.7 MG/DL — SIGNIFICANT CHANGE UP (ref 8.5–10.1)
CHLORIDE SERPL-SCNC: 105 MMOL/L — SIGNIFICANT CHANGE UP (ref 96–108)
CO2 SERPL-SCNC: 36 MMOL/L — HIGH (ref 22–31)
CREAT SERPL-MCNC: 1.12 MG/DL — SIGNIFICANT CHANGE UP (ref 0.5–1.3)
D DIMER BLD IA.RAPID-MCNC: 157 NG/ML DDU — SIGNIFICANT CHANGE UP
EGFR: 87 ML/MIN/1.73M2 — SIGNIFICANT CHANGE UP
EOSINOPHIL # BLD AUTO: 0.08 K/UL — SIGNIFICANT CHANGE UP (ref 0–0.5)
EOSINOPHIL NFR BLD AUTO: 1.1 % — SIGNIFICANT CHANGE UP (ref 0–6)
GLUCOSE SERPL-MCNC: 103 MG/DL — HIGH (ref 70–99)
HCT VFR BLD CALC: 47.9 % — SIGNIFICANT CHANGE UP (ref 39–50)
HGB BLD-MCNC: 13.9 G/DL — SIGNIFICANT CHANGE UP (ref 13–17)
IMM GRANULOCYTES NFR BLD AUTO: 0.4 % — SIGNIFICANT CHANGE UP (ref 0–0.9)
LYMPHOCYTES # BLD AUTO: 1.69 K/UL — SIGNIFICANT CHANGE UP (ref 1–3.3)
LYMPHOCYTES # BLD AUTO: 22.7 % — SIGNIFICANT CHANGE UP (ref 13–44)
MCHC RBC-ENTMCNC: 28.5 PG — SIGNIFICANT CHANGE UP (ref 27–34)
MCHC RBC-ENTMCNC: 29 GM/DL — LOW (ref 32–36)
MCV RBC AUTO: 98.2 FL — SIGNIFICANT CHANGE UP (ref 80–100)
MONOCYTES # BLD AUTO: 0.71 K/UL — SIGNIFICANT CHANGE UP (ref 0–0.9)
MONOCYTES NFR BLD AUTO: 9.5 % — SIGNIFICANT CHANGE UP (ref 2–14)
NEUTROPHILS # BLD AUTO: 4.94 K/UL — SIGNIFICANT CHANGE UP (ref 1.8–7.4)
NEUTROPHILS NFR BLD AUTO: 66.2 % — SIGNIFICANT CHANGE UP (ref 43–77)
NT-PROBNP SERPL-SCNC: 29 PG/ML — SIGNIFICANT CHANGE UP (ref 0–125)
PLATELET # BLD AUTO: 208 K/UL — SIGNIFICANT CHANGE UP (ref 150–400)
POTASSIUM SERPL-MCNC: 4.5 MMOL/L — SIGNIFICANT CHANGE UP (ref 3.5–5.3)
POTASSIUM SERPL-SCNC: 4.5 MMOL/L — SIGNIFICANT CHANGE UP (ref 3.5–5.3)
PROT SERPL-MCNC: 7.7 GM/DL — SIGNIFICANT CHANGE UP (ref 6–8.3)
RBC # BLD: 4.88 M/UL — SIGNIFICANT CHANGE UP (ref 4.2–5.8)
RBC # FLD: 14 % — SIGNIFICANT CHANGE UP (ref 10.3–14.5)
SODIUM SERPL-SCNC: 139 MMOL/L — SIGNIFICANT CHANGE UP (ref 135–145)
TROPONIN I, HIGH SENSITIVITY RESULT: 68.39 NG/L — SIGNIFICANT CHANGE UP
WBC # BLD: 7.46 K/UL — SIGNIFICANT CHANGE UP (ref 3.8–10.5)
WBC # FLD AUTO: 7.46 K/UL — SIGNIFICANT CHANGE UP (ref 3.8–10.5)

## 2024-10-05 PROCEDURE — 99285 EMERGENCY DEPT VISIT HI MDM: CPT

## 2024-10-05 PROCEDURE — 99223 1ST HOSP IP/OBS HIGH 75: CPT

## 2024-10-05 PROCEDURE — 93010 ELECTROCARDIOGRAM REPORT: CPT | Mod: 76

## 2024-10-05 PROCEDURE — 71275 CT ANGIOGRAPHY CHEST: CPT | Mod: 26,MC

## 2024-10-05 PROCEDURE — 99053 MED SERV 10PM-8AM 24 HR FAC: CPT

## 2024-10-05 PROCEDURE — 80048 BASIC METABOLIC PNL TOTAL CA: CPT

## 2024-10-05 PROCEDURE — 84100 ASSAY OF PHOSPHORUS: CPT

## 2024-10-05 PROCEDURE — 93306 TTE W/DOPPLER COMPLETE: CPT | Mod: 26

## 2024-10-05 PROCEDURE — 85027 COMPLETE CBC AUTOMATED: CPT

## 2024-10-05 PROCEDURE — 93970 EXTREMITY STUDY: CPT | Mod: 26

## 2024-10-05 PROCEDURE — 36415 COLL VENOUS BLD VENIPUNCTURE: CPT

## 2024-10-05 PROCEDURE — 83735 ASSAY OF MAGNESIUM: CPT

## 2024-10-05 PROCEDURE — 71045 X-RAY EXAM CHEST 1 VIEW: CPT | Mod: 26

## 2024-10-05 RX ORDER — INFLUENZA VIRUS VACCINE 15; 15; 15; 15 UG/.5ML; UG/.5ML; UG/.5ML; UG/.5ML
0.5 SUSPENSION INTRAMUSCULAR ONCE
Refills: 0 | Status: DISCONTINUED | OUTPATIENT
Start: 2024-10-05 | End: 2024-10-06

## 2024-10-05 RX ADMIN — Medication 5000 UNIT(S): at 21:26

## 2024-10-05 RX ADMIN — Medication 5000 UNIT(S): at 17:14

## 2024-10-05 NOTE — PATIENT PROFILE ADULT - FALL HARM RISK - UNIVERSAL INTERVENTIONS
Bed in lowest position, wheels locked, appropriate side rails in place/Call bell, personal items and telephone in reach/Instruct patient to call for assistance before getting out of bed or chair/Non-slip footwear when patient is out of bed/[place] to call system/Physically safe environment - no spills, clutter or unnecessary equipment/Purposeful Proactive Rounding/Room/bathroom lighting operational, light cord in reach

## 2024-10-05 NOTE — H&P ADULT - NSHPPHYSICALEXAM_GEN_ALL_CORE
PHYSICAL EXAM:    GENERAL: Comfortable, no acute distress   HEAD:  Normocephalic, atraumatic  EYES: EOMI, PERRLA  HEENT: Moist mucous membranes  NECK: Supple, No JVD  NERVOUS SYSTEM:  Alert & Oriented X3, Motor Strength 5/5 B/L upper and lower extremities  CHEST/LUNG: Clear to auscultation bilaterally  HEART: Regular rate and rhythm  ABDOMEN: Soft, non tender, Nondistended, Bowel sounds present  GENITOURINARY: Voiding, no palpable bladder  EXTREMITIES:   No clubbing, cyanosis, or edema  MUSCULOSKELETAL- No muscle tenderness, no joint tenderness  SKIN-no rash

## 2024-10-05 NOTE — ED PROVIDER NOTE - PROGRESS NOTE DETAILS
Lucas, DO: All labs personally reviewed.  Initial troponin negative, BNP 29 which may be falsely elevated due to patient's body habitus.  CTA without any central PE but examination otherwise limited.  Duplex negative in both legs.  Patient trialed off oxygen, desats to 88% while resting.  Some symptoms may be attributed to obesity hypoventilation, patient likely also has sleep apnea.  He remains persistently tachycardic, sinus tachycardia 90–110, SpO2 95% on 2 L nasal cannula.  I, Sheng Zavala, personally discussed this patient's care with Dr. Longoria attending hospitalist who recommends admission to [facility] with remote telemetry.  Cardiology consult and TTE ordered by myself. DO Lucas: Patient signed out to me by Dr. Bueno. Patient is here with lower extremity edema, hypoxia. Plan of care is to follow-up bilateral duplex. Disposition is likely admit. Lucas, DO: All labs personally reviewed.  Initial troponin negative, BNP 29 which may be falsely low due to patient's body habitus.  CTA without any central PE but examination otherwise limited.  Duplex negative in both legs.  Patient trialed off oxygen, desats to 88% while resting.  Some symptoms may be attributed to obesity hypoventilation, patient likely also has sleep apnea.  He remains persistently tachycardic, sinus tachycardia 90–110, SpO2 95% on 2 L nasal cannula.  I, Sheng Zavala, personally discussed this patient's care with Dr. Longoria attending hospitalist who recommends admission to [facility] with remote telemetry.  Cardiology consult and TTE ordered by myself.

## 2024-10-05 NOTE — PATIENT PROFILE ADULT - NSPROGENSOURCEINFO_GEN_A_NUR
PATIENTS ONCOLOGY RECORD LOCATED IN [facility]      Subjective     Name:  KATJA MCMAHON     Date:  2018  Address:  [place] [address]  Home: [phone]  :  1950 AGE:  67 y.o.        RECORDS OBTAINED:  Patients Oncology Record is located in [facility]   patient

## 2024-10-05 NOTE — H&P ADULT - ASSESSMENT
38-year-old male past medical history A-fib, hypertension obesity presents with lower extremity swelling that has been progressive since last week admitted for AHRF.      #acute hypoxic respiratory failure  patient presents with ahrf, endorses no SOB  doing well on 2 L now, bicarb of 36 on bmp, potential component of obesity hypoventilation syndrome  does not use bipap at night  ctpe negative, no fluid overload on ct either  bnp elevated  -wean o2 as tolerated  -f/u tte    #leg swelling  patient has leg swelling of one week   non pitting edema  no wounds seen on either leg  both symmetric   dopplers negative, non infectious appearing  likely cardiac etiology  -f/u TTE     #afib  patient with hx of afib  not on any ac or rate control  rate contrlled here   -f/u 12 lead ekg    #prophylactic measure  F: none  E: replete as needed  N: dash  DVT ppx:

## 2024-10-05 NOTE — H&P ADULT - NSHPLABSRESULTS_GEN_ALL_CORE
.  LABS:                         13.9   7.46  )-----------( 208      ( 05 Oct 2024 03:26 )             47.9     10-05    139  |  105  |  15  ----------------------------<  103[H]  4.5   |  36[H]  |  1.12    Ca    8.7      05 Oct 2024 03:54    TPro  7.7  /  Alb  3.2[L]  /  TBili  0.7  /  DBili  x   /  AST  21  /  ALT  40  /  AlkPhos  84  10-05      Urinalysis Basic - ( 05 Oct 2024 03:54 )    Color: x / Appearance: x / SG: x / pH: x  Gluc: 103 mg/dL / Ketone: x  / Bili: x / Urobili: x   Blood: x / Protein: x / Nitrite: x   Leuk Esterase: x / RBC: x / WBC x   Sq Epi: x / Non Sq Epi: x / Bacteria: x            RADIOLOGY, EKG & ADDITIONAL TESTS: Reviewed.

## 2024-10-05 NOTE — H&P ADULT - HISTORY OF PRESENT ILLNESS
38-year-old male past medical history A-fib, hypertension obesity presents with lower extremity swelling that has been progressive since last week. He works overnight here at  in the cafeteria, he says that he was initially attributing the swelling to his work boots that were new however it did not go away when he changed back the shoes. He has no history of clots heart failure or recent sick exposures. He endorses no shortness of breath or chest pain. No fevers at home. He states he takes no medications at home. He has no wounds that he can find see on his legs and does not get leg pain upon walking around.  Of note patient smokes one pack over 3 days.      Vitals Temp 97.6  /107 91% on RA  Labs WBC 7.46 Hg 13.9 Plt 208 D Dimer 157 Na 139 K 4.5 Cl 105 Bicarb 36 BUN 15 Cr 1.12 Trop I 68 BNP 29   Imaging CTPE   IMPRESSION: NO CENTRAL PULMONARY EMBOLISM. EVALUATION OF THE SEGMENTAL   AND SUBSEGMENTAL PULMONARY ARTERIES IS LIMITED DUE TO POOR CONTRAST BOLUS  Duplex No evidence of deep venous thrombosis in either lower extremity.  Intervention none

## 2024-10-05 NOTE — ED ADULT TRIAGE NOTE - CHIEF COMPLAINT QUOTE
Pt presents from home complaining of BL Lower extremity swelling and pain during ambulation since 9/27. Denies chest pain or shortness of breath. Hx Rapid afib. Taken for EKG

## 2024-10-05 NOTE — ED PROVIDER NOTE - CLINICAL SUMMARY MEDICAL DECISION MAKING FREE TEXT BOX
Patient with shortness of breath and lower extremity edema will rule out CHF, will sono rule out clot, will get ambulatory sat.

## 2024-10-05 NOTE — PATIENT PROFILE ADULT - NSPRESCRALCFREQ_GEN_A_NUR
Pt to ED from home with complaints of SOB, CP and body aches for a few days. Denies COVID exposure but is not vaccinated.   
Never

## 2024-10-05 NOTE — ED ADULT NURSE NOTE - OBJECTIVE STATEMENT
39 y/o male presents to the ED from home c/o bilateral lower extremity swelling and pain during ambulation for the last week. Pt PMHx a fib (used to be on Eliquis and ASA, but is not on any blood thinners at this time).Pt denies chest pain, SOB, fever/chills, N/V/D. Pt also reports smoking about 5 cigarettes every day. EKG complete. Pt has no other complaints at this time, safety and comfort maintained. Awaiting MD angeles at this time,

## 2024-10-05 NOTE — H&P ADULT - NSICDXPASTMEDICALHX_GEN_ALL_CORE_FT
PAST MEDICAL HISTORY:  Atrial fibrillation paroxysmal    Chronic sinusitis     HTN (hypertension)     Morbid obesity     Papilledema of both eyes

## 2024-10-05 NOTE — ED PROVIDER NOTE - OBJECTIVE STATEMENT
38-year-old male past medical history A-fib, hypertension presents with lower extremity swelling and shortness of breath.  Symptoms for past few days.  No history of clots.  No history of heart failure.  No recent illness.

## 2024-10-05 NOTE — ED PROVIDER NOTE - PHYSICAL EXAMINATION
GEN - NAD; well appearing; A+O x3  HEAD - NC/AT    EYES - EOMI, no conjunctival pallor, no scleral icterus  ENT -   mucous membranes  moist , no discharge  PULM - CTA b/l,  symmetric breath sounds  COR -  RRR, S1 S2, no murmurs  ABD - ND, NT, soft, no guarding, no rebound, no masses    EXTREMS -no edema, no deformity, warm and well perfused   SKIN - no rash or bruising      NEUROLOGIC - alert, sensation nl, motor 5/5 RUE/LUE/RLE/LLE GEN - NAD; well appearing; A+O x3  HEAD - NC/AT    EYES - EOMI, no conjunctival pallor, no scleral icterus  ENT -   mucous membranes  moist , no discharge  PULM - CTA b/l,  symmetric breath sounds  COR -  RRR, S1 S2, no murmurs  ABD - ND, NT, soft, no guarding, no rebound, no masses    EXTREMS -+2  edema, no deformity, warm and well perfused   SKIN - no rash or bruising      NEUROLOGIC - alert, sensation nl, motor 5/5 RUE/LUE/RLE/LLE

## 2024-10-05 NOTE — PATIENT PROFILE ADULT - CONTRAINDICATIONS & PRECAUTIONS (SELECT ALL THAT APPLY)
Caller: Emily Perez    
Pt stated that the IBU is only helping jaw pain some but not touching the actual tooth pain-the [employer] where she has been going is closed for the summer-I advised pt can go to ED if pain is severe enough-pain level now 7 with taking the IBU-also advised pt to contact local dentist to assist her-pt wants message sent to Mehnaz  
none...

## 2024-10-05 NOTE — ED ADULT NURSE REASSESSMENT NOTE - NS ED NURSE REASSESS COMMENT FT1
Pt resting comfortably in bed, denies any pain/ symptoms at this time, SpO2 observed to be 86-90% on room air, MD Harris made aware, states to place patient on 2L NC at this time, awaiting bed.

## 2024-10-05 NOTE — PHARMACOTHERAPY INTERVENTION NOTE - COMMENTS
Medication history complete. Medications and allergies reviewed with patient and confirmed with . Patient states that he is not currently taking prescription medication.

## 2024-10-06 VITALS — HEART RATE: 115 BPM

## 2024-10-06 LAB
ANION GAP SERPL CALC-SCNC: 3 MMOL/L — LOW (ref 5–17)
BUN SERPL-MCNC: 13 MG/DL — SIGNIFICANT CHANGE UP (ref 7–23)
CALCIUM SERPL-MCNC: 9.2 MG/DL — SIGNIFICANT CHANGE UP (ref 8.5–10.1)
CHLORIDE SERPL-SCNC: 102 MMOL/L — SIGNIFICANT CHANGE UP (ref 96–108)
CO2 SERPL-SCNC: 35 MMOL/L — HIGH (ref 22–31)
CREAT SERPL-MCNC: 1.03 MG/DL — SIGNIFICANT CHANGE UP (ref 0.5–1.3)
EGFR: 97 ML/MIN/1.73M2 — SIGNIFICANT CHANGE UP
GLUCOSE SERPL-MCNC: 108 MG/DL — HIGH (ref 70–99)
HCT VFR BLD CALC: 50.6 % — HIGH (ref 39–50)
HGB BLD-MCNC: 14.4 G/DL — SIGNIFICANT CHANGE UP (ref 13–17)
MAGNESIUM SERPL-MCNC: 2.1 MG/DL — SIGNIFICANT CHANGE UP (ref 1.6–2.6)
MCHC RBC-ENTMCNC: 28.2 PG — SIGNIFICANT CHANGE UP (ref 27–34)
MCHC RBC-ENTMCNC: 28.5 GM/DL — LOW (ref 32–36)
MCV RBC AUTO: 99 FL — SIGNIFICANT CHANGE UP (ref 80–100)
PHOSPHATE SERPL-MCNC: 5 MG/DL — HIGH (ref 2.5–4.5)
PLATELET # BLD AUTO: 214 K/UL — SIGNIFICANT CHANGE UP (ref 150–400)
POTASSIUM SERPL-MCNC: 4.2 MMOL/L — SIGNIFICANT CHANGE UP (ref 3.5–5.3)
POTASSIUM SERPL-SCNC: 4.2 MMOL/L — SIGNIFICANT CHANGE UP (ref 3.5–5.3)
RBC # BLD: 5.11 M/UL — SIGNIFICANT CHANGE UP (ref 4.2–5.8)
RBC # FLD: 14 % — SIGNIFICANT CHANGE UP (ref 10.3–14.5)
SODIUM SERPL-SCNC: 140 MMOL/L — SIGNIFICANT CHANGE UP (ref 135–145)
WBC # BLD: 7.65 K/UL — SIGNIFICANT CHANGE UP (ref 3.8–10.5)
WBC # FLD AUTO: 7.65 K/UL — SIGNIFICANT CHANGE UP (ref 3.8–10.5)

## 2024-10-06 PROCEDURE — 99239 HOSP IP/OBS DSCHRG MGMT >30: CPT

## 2024-10-06 RX ADMIN — Medication 5000 UNIT(S): at 13:31

## 2024-10-06 RX ADMIN — Medication 5000 UNIT(S): at 05:13

## 2024-10-06 NOTE — DISCHARGE NOTE NURSING/CASE MANAGEMENT/SOCIAL WORK - NSDCPEWEB_GEN_ALL_CORE
[facility] for Tobacco Control website --- [email or web address] website --- [email or web address]

## 2024-10-06 NOTE — DISCHARGE NOTE NURSING/CASE MANAGEMENT/SOCIAL WORK - NSDCPEFALRISK_GEN_ALL_CORE
For information on Fall & Injury Prevention, visit: [email or web address] OR  [email or web address] OR  https://www.cdc.gov/steadi/patient.html

## 2024-10-06 NOTE — DISCHARGE NOTE PROVIDER - NSDCCPCAREPLAN_GEN_ALL_CORE_FT
PRINCIPAL DISCHARGE DIAGNOSIS  Diagnosis: Acute hypoxic respiratory failure  Assessment and Plan of Treatment:       SECONDARY DISCHARGE DIAGNOSES  Diagnosis: Leg edema  Assessment and Plan of Treatment:

## 2024-10-06 NOTE — DISCHARGE NOTE NURSING/CASE MANAGEMENT/SOCIAL WORK - PATIENT PORTAL LINK FT
You can access the FollowMyHealth Patient Portal offered by [facility] by registering at the following website: [email or web address]. By joining [facility]’s FollowMyHealth portal, you will also be able to view your health information using other applications (apps) compatible with our system.

## 2024-10-06 NOTE — DISCHARGE NOTE PROVIDER - ATTENDING DISCHARGE PHYSICAL EXAMINATION:
GENERAL: No acute distress   HEENT:  Normocephalic, atraumatic  CHEST/LUNG: Clear to auscultation bilaterally  HEART: Normal S1, S2, regular rate and rhythm  ABDOMEN: Soft, non tender, Nondistended  EXTREMITIES:   No clubbing, cyanosis. 2+ pedal edema  NEURO: No focal neurological deficits

## 2024-10-06 NOTE — DISCHARGE NOTE PROVIDER - HOSPITAL COURSE
38-year-old male past medical history A-fib, hypertension obesity presents with lower extremity swelling that has been progressive since last week admitted for AHRF.      #Acute Hypoxic Respiratory Failure, resolved  #Lower Extremity Swelling  #Super Morbid Obesity (BMI: 54.5)  #Sleep Apnea  The patient reports that he has noticed increased swelling of his legs over the last 1 week. 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 He denies any fevers, chills, SOB, chest pain, palpitations, abdominal pain, or any N/V/D. During hospitalization a CXR, CTA Chest, U/S Lower Ext, and 2D ECHO revealed no acute findings. The patient had an episode of hypoxia (O2 Sat: 77%) while sleeping overnight but otherwise is saturating 94% on RA this morning and denies any acute respiratory complaints. The likely etiology of the patient's nocturnal hypoxia and lower extremity edema is obesity hypoventilation/sleep apnea and obesity related lymphedema. I have recommended and counseled the patient on the importance of remaining adherent with CPAP and he has verbalized understanding of the same. Elevation of the extremities and compression stockings have been recommended for lower extremity swelling. I offered a trial of diuretics, however the patient would like to try conservative measures first. The patient has also been counseled on the importance of dietary/lifestyle modifications in an effort to lose weight. I have recommended that the patient follow up with his PCP and obtain new referrals for Cardiology and Pulmonary as an outpatient (the patient cannot follow with his previous Cardiology/Pulmonary group due to health insurance related issues).    #Afib  Noted to be in sinus rhythm here. Not on any ac or rate control.  - F/u with Cardiology as outpt

## 2024-10-06 NOTE — DISCHARGE NOTE PROVIDER - CARE PROVIDER_API CALL
Parish Jaquez  Internal Medicine  [address] [place] [address]  Phone: [phone]  Fax: [phone]  Established Patient  Follow Up Time: 1 week

## 2024-10-06 NOTE — DISCHARGE NOTE NURSING/CASE MANAGEMENT/SOCIAL WORK - NSDCPEEMAIL_GEN_ALL_CORE
[facility] for Tobacco Control email [email or web address]

## 2024-10-08 ENCOUNTER — NON-APPOINTMENT (OUTPATIENT)
Age: 39
End: 2024-10-08

## 2024-10-09 ENCOUNTER — NON-APPOINTMENT (OUTPATIENT)
Age: 39
End: 2024-10-09

## 2024-10-09 ENCOUNTER — APPOINTMENT (OUTPATIENT)
Dept: FAMILY MEDICINE | Facility: CLINIC | Age: 39
End: 2024-10-09
Payer: COMMERCIAL

## 2024-10-09 ENCOUNTER — TRANSCRIPTION ENCOUNTER (OUTPATIENT)
Age: 39
End: 2024-10-09

## 2024-10-09 VITALS
TEMPERATURE: 99.8 F | OXYGEN SATURATION: 98 % | BODY MASS INDEX: 45.1 KG/M2 | WEIGHT: 315 LBS | HEART RATE: 117 BPM | HEIGHT: 70 IN | SYSTOLIC BLOOD PRESSURE: 150 MMHG | DIASTOLIC BLOOD PRESSURE: 80 MMHG

## 2024-10-09 DIAGNOSIS — G47.33 OBSTRUCTIVE SLEEP APNEA (ADULT) (PEDIATRIC): ICD-10-CM

## 2024-10-09 DIAGNOSIS — F17.200 NICOTINE DEPENDENCE, UNSPECIFIED, UNCOMPLICATED: ICD-10-CM

## 2024-10-09 DIAGNOSIS — Z86.79 PERSONAL HISTORY OF OTHER DISEASES OF THE CIRCULATORY SYSTEM: ICD-10-CM

## 2024-10-09 DIAGNOSIS — R60.0 LOCALIZED EDEMA: ICD-10-CM

## 2024-10-09 DIAGNOSIS — E66.01 MORBID (SEVERE) OBESITY DUE TO EXCESS CALORIES: ICD-10-CM

## 2024-10-09 PROCEDURE — 99204 OFFICE O/P NEW MOD 45 MIN: CPT

## 2024-10-14 ENCOUNTER — TRANSCRIPTION ENCOUNTER (OUTPATIENT)
Age: 39
End: 2024-10-14

## 2024-10-14 DIAGNOSIS — R60.9 EDEMA, UNSPECIFIED: ICD-10-CM

## 2024-10-14 DIAGNOSIS — I10 ESSENTIAL (PRIMARY) HYPERTENSION: ICD-10-CM

## 2024-10-14 DIAGNOSIS — E55.9 VITAMIN D DEFICIENCY, UNSPECIFIED: ICD-10-CM

## 2024-10-14 DIAGNOSIS — E66.2 MORBID (SEVERE) OBESITY WITH ALVEOLAR HYPOVENTILATION: ICD-10-CM

## 2024-10-14 DIAGNOSIS — J96.01 ACUTE RESPIRATORY FAILURE WITH HYPOXIA: ICD-10-CM

## 2024-10-14 DIAGNOSIS — F17.210 NICOTINE DEPENDENCE, CIGARETTES, UNCOMPLICATED: ICD-10-CM

## 2024-10-14 DIAGNOSIS — G47.33 OBSTRUCTIVE SLEEP APNEA (ADULT) (PEDIATRIC): ICD-10-CM

## 2024-10-14 DIAGNOSIS — I48.0 PAROXYSMAL ATRIAL FIBRILLATION: ICD-10-CM

## 2024-10-14 LAB
25(OH)D3 SERPL-MCNC: 14.7 NG/ML
ALBUMIN SERPL ELPH-MCNC: 3.9 G/DL
ALP BLD-CCNC: 89 U/L
ALT SERPL-CCNC: 29 U/L
ANION GAP SERPL CALC-SCNC: 8 MMOL/L
AST SERPL-CCNC: 23 U/L
BILIRUB SERPL-MCNC: 0.5 MG/DL
BUN SERPL-MCNC: 13 MG/DL
CALCIUM SERPL-MCNC: 9 MG/DL
CHLORIDE SERPL-SCNC: 100 MMOL/L
CHOLEST SERPL-MCNC: 153 MG/DL
CO2 SERPL-SCNC: 34 MMOL/L
CREAT SERPL-MCNC: 1.1 MG/DL
EGFR: 89 ML/MIN/1.73M2
ESTIMATED AVERAGE GLUCOSE: 117 MG/DL
GLUCOSE SERPL-MCNC: 102 MG/DL
HBA1C MFR BLD HPLC: 5.7 %
HCT VFR BLD CALC: 49.6 %
HDLC SERPL-MCNC: 39 MG/DL
HGB BLD-MCNC: 14.2 G/DL
LDLC SERPL CALC-MCNC: 88 MG/DL
MCHC RBC-ENTMCNC: 28 PG
MCHC RBC-ENTMCNC: 28.6 GM/DL
MCV RBC AUTO: 97.8 FL
NONHDLC SERPL-MCNC: 114 MG/DL
PLATELET # BLD AUTO: 215 K/UL
POTASSIUM SERPL-SCNC: 5 MMOL/L
PROT SERPL-MCNC: 7.4 G/DL
RBC # BLD: 5.07 M/UL
RBC # FLD: 14.2 %
SODIUM SERPL-SCNC: 143 MMOL/L
T3FREE SERPL-MCNC: 3.18 PG/ML
T4 FREE SERPL-MCNC: 1 NG/DL
TRIGL SERPL-MCNC: 151 MG/DL
TSH SERPL-ACNC: 2.95 UIU/ML
VIT B12 SERPL-MCNC: 352 PG/ML
WBC # FLD AUTO: 7.07 K/UL

## 2024-10-14 RX ORDER — ERGOCALCIFEROL 1.25 MG/1
1.25 MG CAPSULE ORAL
Qty: 13 | Refills: 0 | Status: ACTIVE | COMMUNITY
Start: 2024-10-14 | End: 1900-01-01

## 2024-10-17 ENCOUNTER — APPOINTMENT (OUTPATIENT)
Dept: CARDIOLOGY | Facility: CLINIC | Age: 39
End: 2024-10-17
Payer: COMMERCIAL

## 2024-10-17 ENCOUNTER — NON-APPOINTMENT (OUTPATIENT)
Age: 39
End: 2024-10-17

## 2024-10-17 VITALS
HEIGHT: 70 IN | BODY MASS INDEX: 45.1 KG/M2 | DIASTOLIC BLOOD PRESSURE: 85 MMHG | SYSTOLIC BLOOD PRESSURE: 132 MMHG | HEART RATE: 127 BPM | WEIGHT: 315 LBS | OXYGEN SATURATION: 91 %

## 2024-10-17 PROCEDURE — 93000 ELECTROCARDIOGRAM COMPLETE: CPT

## 2024-10-17 PROCEDURE — 99204 OFFICE O/P NEW MOD 45 MIN: CPT

## 2024-10-17 PROCEDURE — G2211 COMPLEX E/M VISIT ADD ON: CPT

## 2024-10-17 RX ORDER — FUROSEMIDE 20 MG/1
20 TABLET ORAL AS DIRECTED
Qty: 20 | Refills: 0 | Status: ACTIVE | COMMUNITY
Start: 2024-10-17 | End: 1900-01-01

## 2024-10-31 ENCOUNTER — APPOINTMENT (OUTPATIENT)
Dept: CARDIOLOGY | Facility: CLINIC | Age: 39
End: 2024-10-31
Payer: COMMERCIAL

## 2024-10-31 VITALS
DIASTOLIC BLOOD PRESSURE: 90 MMHG | BODY MASS INDEX: 45.1 KG/M2 | HEIGHT: 70 IN | WEIGHT: 315 LBS | SYSTOLIC BLOOD PRESSURE: 144 MMHG | HEART RATE: 123 BPM | OXYGEN SATURATION: 90 %

## 2024-10-31 PROCEDURE — G2211 COMPLEX E/M VISIT ADD ON: CPT

## 2024-10-31 PROCEDURE — 93000 ELECTROCARDIOGRAM COMPLETE: CPT

## 2024-10-31 PROCEDURE — 99214 OFFICE O/P EST MOD 30 MIN: CPT

## 2024-11-14 ENCOUNTER — NON-APPOINTMENT (OUTPATIENT)
Age: 39
End: 2024-11-14

## 2024-11-14 ENCOUNTER — APPOINTMENT (OUTPATIENT)
Dept: CARDIOLOGY | Facility: CLINIC | Age: 39
End: 2024-11-14
Payer: COMMERCIAL

## 2024-11-14 VITALS
WEIGHT: 315 LBS | OXYGEN SATURATION: 91 % | HEIGHT: 70 IN | HEART RATE: 129 BPM | SYSTOLIC BLOOD PRESSURE: 177 MMHG | DIASTOLIC BLOOD PRESSURE: 91 MMHG | BODY MASS INDEX: 45.1 KG/M2

## 2024-11-14 PROCEDURE — 99214 OFFICE O/P EST MOD 30 MIN: CPT

## 2024-11-14 PROCEDURE — 93000 ELECTROCARDIOGRAM COMPLETE: CPT

## 2024-11-14 PROCEDURE — G2211 COMPLEX E/M VISIT ADD ON: CPT

## 2024-11-14 RX ORDER — POTASSIUM CHLORIDE 1.5 G/1
20 POWDER, FOR SOLUTION ORAL TWICE DAILY
Qty: 42 | Refills: 0 | Status: ACTIVE | COMMUNITY
Start: 2024-11-14 | End: 1900-01-01

## 2024-11-14 RX ORDER — FUROSEMIDE 40 MG/1
40 TABLET ORAL DAILY
Qty: 21 | Refills: 0 | Status: ACTIVE | COMMUNITY
Start: 2024-11-14 | End: 1900-01-01

## 2024-11-19 ENCOUNTER — APPOINTMENT (OUTPATIENT)
Dept: VASCULAR SURGERY | Facility: CLINIC | Age: 39
End: 2024-11-19

## 2024-11-19 VITALS
WEIGHT: 315 LBS | HEIGHT: 70 IN | OXYGEN SATURATION: 88 % | HEART RATE: 114 BPM | DIASTOLIC BLOOD PRESSURE: 79 MMHG | SYSTOLIC BLOOD PRESSURE: 130 MMHG | BODY MASS INDEX: 45.1 KG/M2

## 2024-11-19 PROCEDURE — 99204 OFFICE O/P NEW MOD 45 MIN: CPT

## 2024-11-19 PROCEDURE — 93970 EXTREMITY STUDY: CPT

## 2024-11-26 ENCOUNTER — APPOINTMENT (OUTPATIENT)
Dept: CARDIOLOGY | Facility: CLINIC | Age: 39
End: 2024-11-26
Payer: COMMERCIAL

## 2024-11-26 VITALS
OXYGEN SATURATION: 94 % | BODY MASS INDEX: 45.1 KG/M2 | DIASTOLIC BLOOD PRESSURE: 86 MMHG | HEART RATE: 111 BPM | WEIGHT: 315 LBS | HEIGHT: 70 IN | SYSTOLIC BLOOD PRESSURE: 140 MMHG

## 2024-11-26 PROCEDURE — 99215 OFFICE O/P EST HI 40 MIN: CPT

## 2024-11-26 PROCEDURE — G2211 COMPLEX E/M VISIT ADD ON: CPT

## 2024-12-04 ENCOUNTER — RX RENEWAL (OUTPATIENT)
Age: 39
End: 2024-12-04

## 2024-12-10 ENCOUNTER — APPOINTMENT (OUTPATIENT)
Dept: CARDIOLOGY | Facility: CLINIC | Age: 39
End: 2024-12-10

## 2024-12-10 ENCOUNTER — NON-APPOINTMENT (OUTPATIENT)
Age: 39
End: 2024-12-10

## 2024-12-10 VITALS
SYSTOLIC BLOOD PRESSURE: 128 MMHG | DIASTOLIC BLOOD PRESSURE: 60 MMHG | HEART RATE: 130 BPM | HEIGHT: 70 IN | OXYGEN SATURATION: 93 %

## 2024-12-10 DIAGNOSIS — E66.01 MORBID (SEVERE) OBESITY DUE TO EXCESS CALORIES: ICD-10-CM

## 2024-12-10 PROCEDURE — 99214 OFFICE O/P EST MOD 30 MIN: CPT

## 2024-12-10 PROCEDURE — 93000 ELECTROCARDIOGRAM COMPLETE: CPT

## 2024-12-10 PROCEDURE — G2211 COMPLEX E/M VISIT ADD ON: CPT

## 2024-12-10 RX ORDER — SEMAGLUTIDE 0.25 MG/.5ML
0.25 INJECTION, SOLUTION SUBCUTANEOUS WEEKLY
Qty: 4 | Refills: 3 | Status: ACTIVE | COMMUNITY
Start: 2024-12-10 | End: 1900-01-01

## 2024-12-11 ENCOUNTER — APPOINTMENT (OUTPATIENT)
Dept: FAMILY MEDICINE | Facility: CLINIC | Age: 39
End: 2024-12-11
Payer: COMMERCIAL

## 2024-12-11 VITALS
HEART RATE: 121 BPM | BODY MASS INDEX: 45.1 KG/M2 | HEIGHT: 70 IN | DIASTOLIC BLOOD PRESSURE: 70 MMHG | SYSTOLIC BLOOD PRESSURE: 150 MMHG | OXYGEN SATURATION: 86 % | TEMPERATURE: 98.6 F | WEIGHT: 315 LBS

## 2024-12-11 DIAGNOSIS — F17.200 NICOTINE DEPENDENCE, UNSPECIFIED, UNCOMPLICATED: ICD-10-CM

## 2024-12-11 DIAGNOSIS — R60.0 LOCALIZED EDEMA: ICD-10-CM

## 2024-12-11 PROCEDURE — 99213 OFFICE O/P EST LOW 20 MIN: CPT

## 2024-12-11 RX ORDER — NICOTINE 21 MG/24H
21 PATCH, EXTENDED RELEASE TRANSDERMAL DAILY
Qty: 1 | Refills: 5 | Status: ACTIVE | COMMUNITY
Start: 2024-12-11 | End: 1900-01-01

## 2024-12-12 NOTE — ED ADULT NURSE NOTE - NS_NURSE_DISC_TEACHING_YN_ED_ALL_ED
Please see triage RN note below.    Triage RN spoke to patient and questions answered per note.   Yes

## 2024-12-17 ENCOUNTER — APPOINTMENT (OUTPATIENT)
Dept: VASCULAR SURGERY | Facility: CLINIC | Age: 39
End: 2024-12-17

## 2025-01-09 RX ORDER — NICOTINE POLACRILEX 4 MG/1
4 LOZENGE ORAL
Qty: 1 | Refills: 2 | Status: ACTIVE | COMMUNITY
Start: 2025-01-09 | End: 1900-01-01

## 2025-01-14 ENCOUNTER — NON-APPOINTMENT (OUTPATIENT)
Age: 40
End: 2025-01-14

## 2025-01-14 ENCOUNTER — APPOINTMENT (OUTPATIENT)
Dept: CARDIOLOGY | Facility: CLINIC | Age: 40
End: 2025-01-14
Payer: COMMERCIAL

## 2025-01-14 VITALS
HEIGHT: 70 IN | SYSTOLIC BLOOD PRESSURE: 156 MMHG | OXYGEN SATURATION: 85 % | HEART RATE: 130 BPM | DIASTOLIC BLOOD PRESSURE: 80 MMHG

## 2025-01-14 PROCEDURE — 99215 OFFICE O/P EST HI 40 MIN: CPT

## 2025-01-14 PROCEDURE — G2211 COMPLEX E/M VISIT ADD ON: CPT

## 2025-01-15 ENCOUNTER — OUTPATIENT (OUTPATIENT)
Dept: OUTPATIENT SERVICES | Facility: HOSPITAL | Age: 40
LOS: 1 days | End: 2025-01-15

## 2025-01-15 ENCOUNTER — APPOINTMENT (OUTPATIENT)
Dept: INTERNAL MEDICINE | Facility: CLINIC | Age: 40
End: 2025-01-15

## 2025-01-15 ENCOUNTER — NON-APPOINTMENT (OUTPATIENT)
Age: 40
End: 2025-01-15

## 2025-01-15 ENCOUNTER — TRANSCRIPTION ENCOUNTER (OUTPATIENT)
Age: 40
End: 2025-01-15

## 2025-01-16 ENCOUNTER — TRANSCRIPTION ENCOUNTER (OUTPATIENT)
Age: 40
End: 2025-01-16

## 2025-02-14 NOTE — ASU PATIENT PROFILE, ADULT - FALL HARM RISK TYPE OF ASSESSMENT
[General Appearance - Well Developed] : well developed [General Appearance - Well Nourished] : well nourished [Normal Appearance] : normal appearance [Well Groomed] : well groomed [General Appearance - In No Acute Distress] : no acute distress [] : no respiratory distress [Exaggerated Use Of Accessory Muscles For Inspiration] : no accessory muscle use [Abdomen Soft] : soft [Abdomen Tenderness] : non-tender [External Female Genitalia] : normal external genitalia [Normal Station and Gait] : the gait and station were normal for the patient's age [Skin Color & Pigmentation] : normal skin color and pigmentation [No Focal Deficits] : no focal deficits [Oriented To Time, Place, And Person] : oriented to person, place, and time [Affect] : the affect was normal [Mood] : the mood was normal [Not Anxious] : not anxious [Chaperone Present] : A chaperone was present in the examining room during all aspects of the physical examination [01926] : A chaperone was present during the pelvic exam. [de-identified] : Total procidentia, Negative CST [FreeTextEntry2] : Nia Lo MA Admission

## 2025-02-24 ENCOUNTER — INPATIENT (INPATIENT)
Facility: HOSPITAL | Age: 40
LOS: 1 days | DRG: 195 | End: 2025-02-26
Attending: INTERNAL MEDICINE | Admitting: INTERNAL MEDICINE
Payer: COMMERCIAL

## 2025-02-24 VITALS
OXYGEN SATURATION: 92 % | DIASTOLIC BLOOD PRESSURE: 111 MMHG | HEART RATE: 123 BPM | SYSTOLIC BLOOD PRESSURE: 143 MMHG | WEIGHT: 315 LBS | RESPIRATION RATE: 25 BRPM

## 2025-02-24 DIAGNOSIS — J18.9 PNEUMONIA, UNSPECIFIED ORGANISM: ICD-10-CM

## 2025-02-24 LAB
ADD ON TEST-SPECIMEN IN LAB: SIGNIFICANT CHANGE UP
ALBUMIN SERPL ELPH-MCNC: 2.6 G/DL — LOW (ref 3.3–5)
ALP SERPL-CCNC: 62 U/L — SIGNIFICANT CHANGE UP (ref 40–120)
ALT FLD-CCNC: 70 U/L — SIGNIFICANT CHANGE UP (ref 12–78)
ANION GAP SERPL CALC-SCNC: 2 MMOL/L — LOW (ref 5–17)
ANISOCYTOSIS BLD QL: SLIGHT — SIGNIFICANT CHANGE UP
APPEARANCE UR: CLEAR — SIGNIFICANT CHANGE UP
APTT BLD: 30.8 SEC — SIGNIFICANT CHANGE UP (ref 24.5–35.6)
AST SERPL-CCNC: 56 U/L — HIGH (ref 15–37)
BACTERIA # UR AUTO: NEGATIVE /HPF — SIGNIFICANT CHANGE UP
BASE EXCESS BLDA CALC-SCNC: 7.9 MMOL/L — HIGH (ref -2–3)
BASE EXCESS BLDV CALC-SCNC: 9.9 MMOL/L — HIGH (ref -2–3)
BASOPHILS # BLD AUTO: 0.02 K/UL — SIGNIFICANT CHANGE UP (ref 0–0.2)
BASOPHILS NFR BLD AUTO: 0.2 % — SIGNIFICANT CHANGE UP (ref 0–2)
BILIRUB SERPL-MCNC: 1.3 MG/DL — HIGH (ref 0.2–1.2)
BILIRUB UR-MCNC: NEGATIVE — SIGNIFICANT CHANGE UP
BLOOD GAS COMMENTS ARTERIAL: SIGNIFICANT CHANGE UP
BUN SERPL-MCNC: 12 MG/DL — SIGNIFICANT CHANGE UP (ref 7–23)
CALCIUM SERPL-MCNC: 8.5 MG/DL — SIGNIFICANT CHANGE UP (ref 8.5–10.1)
CAST: SIGNIFICANT CHANGE UP /LPF (ref 0–4)
CHLORIDE SERPL-SCNC: 98 MMOL/L — SIGNIFICANT CHANGE UP (ref 96–108)
CO2 SERPL-SCNC: 38 MMOL/L — HIGH (ref 22–31)
COLOR SPEC: YELLOW — SIGNIFICANT CHANGE UP
CREAT SERPL-MCNC: 1.39 MG/DL — HIGH (ref 0.5–1.3)
DIFF PNL FLD: ABNORMAL
EGFR: 67 ML/MIN/1.73M2 — SIGNIFICANT CHANGE UP
EOSINOPHIL # BLD AUTO: 0.01 K/UL — SIGNIFICANT CHANGE UP (ref 0–0.5)
EOSINOPHIL NFR BLD AUTO: 0.1 % — SIGNIFICANT CHANGE UP (ref 0–6)
FLUAV AG NPH QL: SIGNIFICANT CHANGE UP
FLUBV AG NPH QL: SIGNIFICANT CHANGE UP
GAS PNL BLDV: SIGNIFICANT CHANGE UP
GLUCOSE SERPL-MCNC: 123 MG/DL — HIGH (ref 70–99)
GLUCOSE UR QL: NEGATIVE MG/DL — SIGNIFICANT CHANGE UP
HCO3 BLDA-SCNC: 36 MMOL/L — HIGH (ref 21–28)
HCO3 BLDV-SCNC: 41 MMOL/L — HIGH (ref 22–29)
HCT VFR BLD CALC: 44.2 % — SIGNIFICANT CHANGE UP (ref 39–50)
HGB BLD-MCNC: 11.9 G/DL — LOW (ref 13–17)
IMM GRANULOCYTES # BLD AUTO: 0.07 K/UL — SIGNIFICANT CHANGE UP (ref 0–0.07)
IMM GRANULOCYTES NFR BLD AUTO: 0.5 % — SIGNIFICANT CHANGE UP (ref 0–0.9)
INR BLD: 1.58 RATIO — HIGH (ref 0.85–1.16)
KETONES UR-MCNC: NEGATIVE MG/DL — SIGNIFICANT CHANGE UP
LACTATE SERPL-SCNC: 1.4 MMOL/L — SIGNIFICANT CHANGE UP (ref 0.7–2)
LACTATE SERPL-SCNC: 1.6 MMOL/L — SIGNIFICANT CHANGE UP (ref 0.7–2)
LEUKOCYTE ESTERASE UR-ACNC: NEGATIVE — SIGNIFICANT CHANGE UP
LYMPHOCYTES # BLD AUTO: 1.98 K/UL — SIGNIFICANT CHANGE UP (ref 1–3.3)
LYMPHOCYTES NFR BLD AUTO: 15 % — SIGNIFICANT CHANGE UP (ref 13–44)
MANUAL SMEAR VERIFICATION: SIGNIFICANT CHANGE UP
MCHC RBC-ENTMCNC: 22.8 PG — LOW (ref 27–34)
MCHC RBC-ENTMCNC: 26.9 G/DL — LOW (ref 32–36)
MCV RBC AUTO: 84.8 FL — SIGNIFICANT CHANGE UP (ref 80–100)
MICROCYTES BLD QL: SLIGHT — SIGNIFICANT CHANGE UP
MONOCYTES # BLD AUTO: 1.78 K/UL — HIGH (ref 0–0.9)
MONOCYTES NFR BLD AUTO: 13.5 % — SIGNIFICANT CHANGE UP (ref 2–14)
NEUTROPHILS # BLD AUTO: 9.37 K/UL — HIGH (ref 1.8–7.4)
NEUTROPHILS NFR BLD AUTO: 70.7 % — SIGNIFICANT CHANGE UP (ref 43–77)
NITRITE UR-MCNC: NEGATIVE — SIGNIFICANT CHANGE UP
NRBC # BLD AUTO: 0.18 K/UL — HIGH (ref 0–0)
NRBC # FLD: 0.18 K/UL — HIGH (ref 0–0)
NRBC BLD AUTO-RTO: 1 /100 WBCS — HIGH (ref 0–0)
NT-PROBNP SERPL-SCNC: 4326 PG/ML — HIGH (ref 0–125)
PCO2 BLDA: 69 MMHG — HIGH (ref 35–48)
PCO2 BLDV: 91 MMHG — CRITICAL HIGH (ref 42–55)
PH BLDA: 7.33 — LOW (ref 7.35–7.45)
PH BLDV: 7.26 — LOW (ref 7.32–7.43)
PH UR: 5 — SIGNIFICANT CHANGE UP (ref 5–8)
PLAT MORPH BLD: NORMAL — SIGNIFICANT CHANGE UP
PLATELET # BLD AUTO: 188 K/UL — SIGNIFICANT CHANGE UP (ref 150–400)
PMV BLD: 10.7 FL — SIGNIFICANT CHANGE UP (ref 7–13)
PO2 BLDA: 67 MMHG — LOW (ref 83–108)
PO2 BLDV: 48 MMHG — HIGH (ref 25–45)
POLYCHROMASIA BLD QL SMEAR: SLIGHT — SIGNIFICANT CHANGE UP
POTASSIUM SERPL-MCNC: 4.5 MMOL/L — SIGNIFICANT CHANGE UP (ref 3.5–5.3)
POTASSIUM SERPL-SCNC: 4.5 MMOL/L — SIGNIFICANT CHANGE UP (ref 3.5–5.3)
PROT SERPL-MCNC: 7.7 GM/DL — SIGNIFICANT CHANGE UP (ref 6–8.3)
PROT UR-MCNC: 300 MG/DL
PROTHROM AB SERPL-ACNC: 18.6 SEC — HIGH (ref 9.9–13.4)
RBC # BLD: 5.21 M/UL — SIGNIFICANT CHANGE UP (ref 4.2–5.8)
RBC # FLD: 19.1 % — HIGH (ref 10.3–14.5)
RBC BLD AUTO: ABNORMAL
RBC CASTS # UR COMP ASSIST: 3 /HPF — SIGNIFICANT CHANGE UP (ref 0–4)
RSV RNA NPH QL NAA+NON-PROBE: SIGNIFICANT CHANGE UP
SAO2 % BLDA: 94 % — SIGNIFICANT CHANGE UP (ref 94–98)
SAO2 % BLDV: 74 % — SIGNIFICANT CHANGE UP (ref 67–88)
SARS-COV-2 RNA SPEC QL NAA+PROBE: SIGNIFICANT CHANGE UP
SODIUM SERPL-SCNC: 138 MMOL/L — SIGNIFICANT CHANGE UP (ref 135–145)
SP GR SPEC: >1.03 — HIGH (ref 1–1.03)
SQUAMOUS # UR AUTO: 2 /HPF — SIGNIFICANT CHANGE UP (ref 0–5)
TROPONIN I, HIGH SENSITIVITY RESULT: 144.29 NG/L — HIGH
UROBILINOGEN FLD QL: 1 MG/DL — SIGNIFICANT CHANGE UP (ref 0.2–1)
WBC # BLD: 13.23 K/UL — HIGH (ref 3.8–10.5)
WBC # FLD AUTO: 13.23 K/UL — HIGH (ref 3.8–10.5)
WBC MORPHOLOGY: NORMAL — SIGNIFICANT CHANGE UP
WBC UR QL: 1 /HPF — SIGNIFICANT CHANGE UP (ref 0–5)

## 2025-02-24 PROCEDURE — 86900 BLOOD TYPING SEROLOGIC ABO: CPT

## 2025-02-24 PROCEDURE — 80048 BASIC METABOLIC PNL TOTAL CA: CPT

## 2025-02-24 PROCEDURE — 87899 AGENT NOS ASSAY W/OPTIC: CPT

## 2025-02-24 PROCEDURE — 87640 STAPH A DNA AMP PROBE: CPT

## 2025-02-24 PROCEDURE — 36415 COLL VENOUS BLD VENIPUNCTURE: CPT

## 2025-02-24 PROCEDURE — 87070 CULTURE OTHR SPECIMN AEROBIC: CPT

## 2025-02-24 PROCEDURE — 84443 ASSAY THYROID STIM HORMONE: CPT

## 2025-02-24 PROCEDURE — 86923 COMPATIBILITY TEST ELECTRIC: CPT

## 2025-02-24 PROCEDURE — 81001 URINALYSIS AUTO W/SCOPE: CPT

## 2025-02-24 PROCEDURE — 83036 HEMOGLOBIN GLYCOSYLATED A1C: CPT

## 2025-02-24 PROCEDURE — 84145 PROCALCITONIN (PCT): CPT

## 2025-02-24 PROCEDURE — 71045 X-RAY EXAM CHEST 1 VIEW: CPT | Mod: 26,77

## 2025-02-24 PROCEDURE — G0508: CPT | Mod: 95

## 2025-02-24 PROCEDURE — 99291 CRITICAL CARE FIRST HOUR: CPT | Mod: 25

## 2025-02-24 PROCEDURE — 71275 CT ANGIOGRAPHY CHEST: CPT | Mod: 26

## 2025-02-24 PROCEDURE — 85027 COMPLETE CBC AUTOMATED: CPT

## 2025-02-24 PROCEDURE — 86901 BLOOD TYPING SEROLOGIC RH(D): CPT

## 2025-02-24 PROCEDURE — 86850 RBC ANTIBODY SCREEN: CPT

## 2025-02-24 PROCEDURE — 87449 NOS EACH ORGANISM AG IA: CPT

## 2025-02-24 PROCEDURE — 83735 ASSAY OF MAGNESIUM: CPT

## 2025-02-24 PROCEDURE — 83605 ASSAY OF LACTIC ACID: CPT

## 2025-02-24 PROCEDURE — 93306 TTE W/DOPPLER COMPLETE: CPT

## 2025-02-24 PROCEDURE — 80053 COMPREHEN METABOLIC PANEL: CPT

## 2025-02-24 PROCEDURE — 93010 ELECTROCARDIOGRAM REPORT: CPT

## 2025-02-24 PROCEDURE — 87081 CULTURE SCREEN ONLY: CPT

## 2025-02-24 PROCEDURE — 82962 GLUCOSE BLOOD TEST: CPT

## 2025-02-24 PROCEDURE — 82803 BLOOD GASES ANY COMBINATION: CPT

## 2025-02-24 PROCEDURE — 83880 ASSAY OF NATRIURETIC PEPTIDE: CPT

## 2025-02-24 PROCEDURE — 94002 VENT MGMT INPAT INIT DAY: CPT

## 2025-02-24 PROCEDURE — 87086 URINE CULTURE/COLONY COUNT: CPT

## 2025-02-24 PROCEDURE — 94640 AIRWAY INHALATION TREATMENT: CPT

## 2025-02-24 PROCEDURE — 71045 X-RAY EXAM CHEST 1 VIEW: CPT | Mod: 26

## 2025-02-24 PROCEDURE — 36600 WITHDRAWAL OF ARTERIAL BLOOD: CPT

## 2025-02-24 PROCEDURE — 74177 CT ABD & PELVIS W/CONTRAST: CPT | Mod: 26

## 2025-02-24 PROCEDURE — 84484 ASSAY OF TROPONIN QUANT: CPT

## 2025-02-24 PROCEDURE — 87641 MR-STAPH DNA AMP PROBE: CPT

## 2025-02-24 PROCEDURE — 31500 INSERT EMERGENCY AIRWAY: CPT

## 2025-02-24 PROCEDURE — 93970 EXTREMITY STUDY: CPT

## 2025-02-24 PROCEDURE — 71045 X-RAY EXAM CHEST 1 VIEW: CPT

## 2025-02-24 PROCEDURE — 84100 ASSAY OF PHOSPHORUS: CPT

## 2025-02-24 PROCEDURE — 93005 ELECTROCARDIOGRAM TRACING: CPT

## 2025-02-24 RX ORDER — FUROSEMIDE 10 MG/ML
40 INJECTION INTRAMUSCULAR; INTRAVENOUS DAILY
Refills: 0 | Status: DISCONTINUED | OUTPATIENT
Start: 2025-02-25 | End: 2025-02-25

## 2025-02-24 RX ORDER — IPRATROPIUM BROMIDE AND ALBUTEROL SULFATE .5; 2.5 MG/3ML; MG/3ML
3 SOLUTION RESPIRATORY (INHALATION) EVERY 6 HOURS
Refills: 0 | Status: DISCONTINUED | OUTPATIENT
Start: 2025-02-24 | End: 2025-02-26

## 2025-02-24 RX ORDER — CISATRACURIUM BESYLATE 10 MG/5ML
3 INJECTION, SOLUTION INTRAVENOUS
Qty: 200 | Refills: 0 | Status: DISCONTINUED | OUTPATIENT
Start: 2025-02-24 | End: 2025-02-25

## 2025-02-24 RX ORDER — SUCCINYLCHOLINE CHLORIDE 20 MG/ML
200 VIAL (ML) INJECTION ONCE
Refills: 0 | Status: COMPLETED | OUTPATIENT
Start: 2025-02-24 | End: 2025-02-24

## 2025-02-24 RX ORDER — DEXAMETHASONE 0.5 MG/1
10 TABLET ORAL ONCE
Refills: 0 | Status: DISCONTINUED | OUTPATIENT
Start: 2025-02-24 | End: 2025-02-24

## 2025-02-24 RX ORDER — CEFEPIME 2 G/20ML
2000 INJECTION, POWDER, FOR SOLUTION INTRAVENOUS EVERY 8 HOURS
Refills: 0 | Status: DISCONTINUED | OUTPATIENT
Start: 2025-02-24 | End: 2025-02-26

## 2025-02-24 RX ORDER — HEPARIN SODIUM 1000 [USP'U]/ML
5000 INJECTION INTRAVENOUS; SUBCUTANEOUS EVERY 12 HOURS
Refills: 0 | Status: DISCONTINUED | OUTPATIENT
Start: 2025-02-24 | End: 2025-02-25

## 2025-02-24 RX ORDER — METHYLPREDNISOLONE ACETATE 80 MG/ML
50 INJECTION, SUSPENSION INTRA-ARTICULAR; INTRALESIONAL; INTRAMUSCULAR; SOFT TISSUE EVERY 6 HOURS
Refills: 0 | Status: DISCONTINUED | OUTPATIENT
Start: 2025-02-24 | End: 2025-02-24

## 2025-02-24 RX ORDER — AZITHROMYCIN 250 MG
500 CAPSULE ORAL ONCE
Refills: 0 | Status: COMPLETED | OUTPATIENT
Start: 2025-02-24 | End: 2025-02-24

## 2025-02-24 RX ORDER — ACETAMINOPHEN 500 MG/5ML
1000 LIQUID (ML) ORAL ONCE
Refills: 0 | Status: COMPLETED | OUTPATIENT
Start: 2025-02-24 | End: 2025-02-24

## 2025-02-24 RX ORDER — DEXAMETHASONE 0.5 MG/1
10 TABLET ORAL ONCE
Refills: 0 | Status: COMPLETED | OUTPATIENT
Start: 2025-02-24 | End: 2025-02-24

## 2025-02-24 RX ORDER — FUROSEMIDE 10 MG/ML
40 INJECTION INTRAMUSCULAR; INTRAVENOUS ONCE
Refills: 0 | Status: COMPLETED | OUTPATIENT
Start: 2025-02-24 | End: 2025-02-24

## 2025-02-24 RX ORDER — CEFEPIME 2 G/20ML
2000 INJECTION, POWDER, FOR SOLUTION INTRAVENOUS EVERY 8 HOURS
Refills: 0 | Status: DISCONTINUED | OUTPATIENT
Start: 2025-02-24 | End: 2025-02-24

## 2025-02-24 RX ORDER — PROPOFOL 10 MG/ML
20 INJECTION, EMULSION INTRAVENOUS
Qty: 1000 | Refills: 0 | Status: DISCONTINUED | OUTPATIENT
Start: 2025-02-24 | End: 2025-02-26

## 2025-02-24 RX ORDER — METHYLPREDNISOLONE ACETATE 80 MG/ML
40 INJECTION, SUSPENSION INTRA-ARTICULAR; INTRALESIONAL; INTRAMUSCULAR; SOFT TISSUE EVERY 8 HOURS
Refills: 0 | Status: DISCONTINUED | OUTPATIENT
Start: 2025-02-24 | End: 2025-02-26

## 2025-02-24 RX ORDER — CEFTRIAXONE 500 MG/1
1000 INJECTION, POWDER, FOR SOLUTION INTRAMUSCULAR; INTRAVENOUS ONCE
Refills: 0 | Status: DISCONTINUED | OUTPATIENT
Start: 2025-02-24 | End: 2025-02-24

## 2025-02-24 RX ORDER — ROCURONIUM BROMIDE 10 MG/ML
200 INJECTION, SOLUTION INTRAVENOUS ONCE
Refills: 0 | Status: COMPLETED | OUTPATIENT
Start: 2025-02-24 | End: 2025-02-24

## 2025-02-24 RX ORDER — VANCOMYCIN HCL IN 5 % DEXTROSE 1.5G/250ML
2000 PLASTIC BAG, INJECTION (ML) INTRAVENOUS ONCE
Refills: 0 | Status: COMPLETED | OUTPATIENT
Start: 2025-02-24 | End: 2025-02-24

## 2025-02-24 RX ORDER — INFLUENZA A VIRUS A/IDAHO/07/2018 (H1N1) ANTIGEN (MDCK CELL DERIVED, PROPIOLACTONE INACTIVATED, INFLUENZA A VIRUS A/INDIANA/08/2018 (H3N2) ANTIGEN (MDCK CELL DERIVED, PROPIOLACTONE INACTIVATED), INFLUENZA B VIRUS B/SINGAPORE/INFTT-16-0610/2016 ANTIGEN (MDCK CELL DERIVED, PROPIOLACTONE INACTIVATED), INFLUENZA B VIRUS B/IOWA/06/2017 ANTIGEN (MDCK CELL DERIVED, PROPIOLACTONE INACTIVATED) 15; 15; 15; 15 UG/.5ML; UG/.5ML; UG/.5ML; UG/.5ML
0.5 INJECTION, SUSPENSION INTRAMUSCULAR ONCE
Refills: 0 | Status: DISCONTINUED | OUTPATIENT
Start: 2025-02-24 | End: 2025-02-26

## 2025-02-24 RX ORDER — KETAMINE HCL IN 0.9 % NACL 50 MG/5 ML
200 SYRINGE (ML) INTRAVENOUS ONCE
Refills: 0 | Status: DISCONTINUED | OUTPATIENT
Start: 2025-02-24 | End: 2025-02-24

## 2025-02-24 RX ORDER — PROPOFOL 10 MG/ML
20 INJECTION, EMULSION INTRAVENOUS
Qty: 500 | Refills: 0 | Status: DISCONTINUED | OUTPATIENT
Start: 2025-02-24 | End: 2025-02-24

## 2025-02-24 RX ORDER — VANCOMYCIN HCL IN 5 % DEXTROSE 1.5G/250ML
1000 PLASTIC BAG, INJECTION (ML) INTRAVENOUS ONCE
Refills: 0 | Status: DISCONTINUED | OUTPATIENT
Start: 2025-02-24 | End: 2025-02-24

## 2025-02-24 RX ORDER — FENTANYL CITRATE-0.9 % NACL/PF 100MCG/2ML
3 SYRINGE (ML) INTRAVENOUS
Qty: 2500 | Refills: 0 | Status: DISCONTINUED | OUTPATIENT
Start: 2025-02-24 | End: 2025-02-25

## 2025-02-24 RX ADMIN — Medication 200 MILLIGRAM(S): at 20:08

## 2025-02-24 RX ADMIN — Medication 250 MILLIGRAM(S): at 23:08

## 2025-02-24 RX ADMIN — METHYLPREDNISOLONE ACETATE 40 MILLIGRAM(S): 80 INJECTION, SUSPENSION INTRA-ARTICULAR; INTRALESIONAL; INTRAMUSCULAR; SOFT TISSUE at 23:43

## 2025-02-24 RX ADMIN — Medication 400 MILLIGRAM(S): at 21:07

## 2025-02-24 RX ADMIN — FUROSEMIDE 40 MILLIGRAM(S): 10 INJECTION INTRAMUSCULAR; INTRAVENOUS at 23:07

## 2025-02-24 RX ADMIN — Medication 200 MILLIGRAM(S): at 20:40

## 2025-02-24 RX ADMIN — Medication 250 MILLIGRAM(S): at 21:14

## 2025-02-24 RX ADMIN — PROPOFOL 28.7 MICROGRAM(S)/KG/MIN: 10 INJECTION, EMULSION INTRAVENOUS at 21:13

## 2025-02-24 RX ADMIN — HEPARIN SODIUM 5000 UNIT(S): 1000 INJECTION INTRAVENOUS; SUBCUTANEOUS at 23:08

## 2025-02-24 RX ADMIN — Medication 1000 MILLILITER(S): at 08:42

## 2025-02-24 RX ADMIN — CEFEPIME 2000 MILLIGRAM(S): 2 INJECTION, POWDER, FOR SOLUTION INTRAVENOUS at 21:45

## 2025-02-24 RX ADMIN — Medication 71.8 MICROGRAM(S)/KG/HR: at 23:10

## 2025-02-24 RX ADMIN — PROPOFOL 28.7 MICROGRAM(S)/KG/MIN: 10 INJECTION, EMULSION INTRAVENOUS at 23:43

## 2025-02-24 RX ADMIN — DEXAMETHASONE 10 MILLIGRAM(S): 0.5 TABLET ORAL at 20:39

## 2025-02-24 RX ADMIN — Medication 200 MILLIGRAM(S): at 23:10

## 2025-02-24 NOTE — ED ADULT TRIAGE NOTE - GLASGOW COMA SCALE: BEST VERBAL RESPONSE, MLM
Seen in ED on 6/9/17 for back pain. Decline an appointment. Her daughter will scheduled an appointment with [facility] Medicine [facility] Orth[facility] in [address] to see about getting a steroid injection. (V5) oriented

## 2025-02-24 NOTE — ED ADULT NURSE REASSESSMENT NOTE - NS ED NURSE REASSESS COMMENT FT1
As per MD Zavala, pt obtunded and requested to be moved to trauma room to be prepped for intubation.

## 2025-02-24 NOTE — ED PROVIDER NOTE - PROGRESS NOTE DETAILS
Blood gas with acute on chronic hypercarbic respiratory failure causing respiratory acidosis.  Respiratory therapist called to initiate BiPAP. CTA chest showing multifocal pneumonia as well as evaluating the pannus for possible edema/induration concerning for cellulitis.  Broad-spectrum antibiotics ordered, will give gentle IV fluids due to concern for some volume overload as well.  Spoke to ICU PA, team will evaluate the patient for BiPAP use in the setting of hypercarbic respiratory failure.  Patient would likely benefit from stepdown care.  Discussed results and plan with patient all questions answered. Reassessed, now completely obtunded.  There are concerns that his pCO2 has been uptrending despite being started on BiPAP.  Spoke with patient's mother and aunt at bedside regarding intubation for hypercarbic respiratory failure, they are agreeable.  Patient moved to trauma by.  Will perform intubation for airway protection due to mental status. Tube placement technically difficult, anesthesia was paged overhead.  See procedure note for further details.  ETT ultimately placed.  ICU PAs at bedside who accept admission. CTA chest showing multifocal pneumonia as well as evaluating the pannus for possible edema/induration concerning for cellulitis.  Broad-spectrum antibiotics ordered, will give gentle IV fluids due to concern for some volume overload as well.  Spoke to ICU PA, team will evaluate the patient for BiPAP use in the setting of hypercarbic respiratory failure.  Patient would likely benefit from stepdown care.  Discussed results and plan with patient all questions answered.    This patient was evaluated for sepsis and a sepsis huddle was performed. The patient has a contraindication to 30cc/kg fluid administration due to concerns for CHF. The patient will receive 1000cc of fluid and then their fluid status will be reassessed.

## 2025-02-24 NOTE — H&P ADULT - NSHPPHYSICALEXAM_GEN_ALL_CORE
Physical Examination:    General: Intubated, sedated. Ill appearing.     HEENT: Pupils equal, reactive to light. Symmetric.     PULM: Negative wheezing or rhonchi appreciated.     NECK:  trachea midline, negative JVD     CVS: Sinus tachycardia, S1S2 +     ABD: Soft, normoactive BS     EXT: B/l lower extremity swelling     SKIN: Warm and well perfused, no rashes noted.    NEURO: Sedated on propofol Physical Examination:    General: Morbidly obese, ill appearing, afebrile. Intubated, sedated.    HEENT: Pupils equal, reactive to light. Symmetric. Short, fat neck. Head NCAT. [facility]    PULM: Labored, tachypneic. Tight with decreased BS b/l. Desynchronous on vent    NECK:  trachea midline, negative JVD     CVS: Sinus tachycardia, S1S2 +     ABD: Soft, normoactive BS     EXT: B/l lower extremity swelling     SKIN: Warm and well perfused, no rashes noted.    NEURO: Sedated on propofol. Paralyzed

## 2025-02-24 NOTE — CONSULT NOTE ADULT - SUBJECTIVE AND OBJECTIVE BOX
CHIEF COMPLAINT:    HPI:  Patient is a 39 year old male with PMHx of obesity, paroxysmal afib, HTN presented to ED on 2/24 for hypoxia with oxygen saturation reported to be 70% on RA and b/l leg edema. In ED initiated on HFNC, transitioned to BiPAP due to worsening dyspnea. CT noting b/l PNA and possible cellulitis of the abdominal pannus. Patient's respiratory status rapidly decompensated, patient somnolent with increased secretions. Intubated with anesthesia due to difficult airway. Critical care consulted for acute on chronic hypercapnic RF.     Patient evaluated at bedside in ED intubated and sedated. Unable to obtain ROS.      PAST MEDICAL & SURGICAL HISTORY:  HTN (hypertension)      Atrial fibrillation  paroxysmal      Papilledema of both eyes      Chronic sinusitis      Morbid obesity      No significant past surgical history          FAMILY HISTORY:      SOCIAL HISTORY:  Smoking: __ packs x ___ years  EtOH Use:  Marital Status:  Occupation:  Recent Travel:  Country of Birth:  Advance Directives:    Allergies    No Known Allergies    Intolerances        HOME MEDICATIONS:    REVIEW OF SYSTEMS:    Unable to assess ROS because patient is intubated and sedated     OBJECTIVE:  ICU Vital Signs Last 24 Hrs  T(C): 36.8 (24 Feb 2025 19:05), Max: 36.8 (24 Feb 2025 19:05)  T(F): 98.2 (24 Feb 2025 19:05), Max: 98.2 (24 Feb 2025 19:05)  HR: 124 (24 Feb 2025 20:45) (123 - 130)  BP: 177/121 (24 Feb 2025 19:05) (143/111 - 177/121)  BP(mean): 121 (24 Feb 2025 18:45) (117 - 121)  ABP: --  ABP(mean): --  RR: 26 (24 Feb 2025 19:05) (25 - 26)  SpO2: 100% (24 Feb 2025 20:45) (89% - 100%)    O2 Parameters below as of 24 Feb 2025 19:05  Patient On (Oxygen Delivery Method): mask, nonrebreather          Mode: AC/ CMV (Assist Control/ Continuous Mandatory Ventilation), RR (machine): 22, TV (machine): 500, FiO2: 100, PEEP: 6, ITime: 1, PIP: 47    CAPILLARY BLOOD GLUCOSE          PHYSICAL EXAM:  Not performed. Consultation performed over telephone     HOSPITAL MEDICATIONS:  MEDICATIONS  (STANDING):  albuterol/ipratropium for Nebulization 3 milliLiter(s) Nebulizer every 6 hours  cefepime  Injectable. 2000 milliGRAM(s) IV Push every 8 hours  chlorhexidine 2% Cloths 1 Application(s) Topical <User Schedule>  furosemide   Injectable 40 milliGRAM(s) IV Push once  heparin   Injectable 5000 Unit(s) SubCutaneous every 12 hours  methylPREDNISolone sodium succinate Injectable 40 milliGRAM(s) IV Push every 8 hours  pantoprazole  Injectable 40 milliGRAM(s) IV Push daily  propofol Infusion 20 MICROgram(s)/kG/Min (28.7 mL/Hr) IV Continuous <Continuous>  vancomycin  IVPB 2000 milliGRAM(s) IV Intermittent once    MEDICATIONS  (PRN):      LABS:                        11.9   13.23 )-----------( 188      ( 24 Feb 2025 18:19 )             44.2     02-24    138  |  98  |  12  ----------------------------<  123[H]  4.5   |  38[H]  |  1.39[H]    Ca    8.5      24 Feb 2025 18:19    TPro  7.7  /  Alb  2.6[L]  /  TBili  1.3[H]  /  DBili  x   /  AST  56[H]  /  ALT  70  /  AlkPhos  62  02-24    PT/INR - ( 24 Feb 2025 18:19 )   PT: 18.6 sec;   INR: 1.58 ratio         PTT - ( 24 Feb 2025 18:19 )  PTT:30.8 sec  Urinalysis Basic - ( 24 Feb 2025 21:49 )    Color: Yellow / Appearance: Clear / SG: >1.030 / pH: x  Gluc: x / Ketone: Negative mg/dL  / Bili: Negative / Urobili: 1.0 mg/dL   Blood: x / Protein: 300 mg/dL / Nitrite: Negative   Leuk Esterase: Negative / RBC: 3 /HPF / WBC 1 /HPF   Sq Epi: x / Non Sq Epi: 2 /HPF / Bacteria: Negative /HPF        Venous Blood Gas:  02-24 @ 18:19  7.26/91/48/41/74  VBG Lactate: --      MICROBIOLOGY:     RADIOLOGY:  [ ] Reviewed and interpreted by me    EKG:

## 2025-02-24 NOTE — ED ADULT TRIAGE NOTE - CHIEF COMPLAINT QUOTE
Pt BIBEMS from home c/o difficulty breathing, b/l lower leg and abd swelling. Pt states the sob has been difficult x 1 month and abd swelling x 1 week. As per EMS pt was 70% on ra, placed on 6L o2 enroute. pt taken directly to trauma. Pt transferred to [facility] and EVS called for bariatric bed.  pt denies chest pain, dizziness or fever.

## 2025-02-24 NOTE — ED PROVIDER NOTE - CARE PLAN
1 Principal Discharge DX:	Sepsis due to pneumonia  Secondary Diagnosis:	Acute respiratory failure with hypoxia and hypercapnia  Secondary Diagnosis:	Cellulitis

## 2025-02-24 NOTE — CONSULT NOTE ADULT - ASSESSMENT
39-year-old male with morbid obesity, paroxysmal A-fib, hypertension admitted for acute hypercapnic respiratory failure due to multifocal pneumonia.    Plan  Neuro: Patient currently intubated and sedated.  Maintain sedation to RASS goal of 0 to -1.  Spontaneous awakening trials as tolerated.  Cardiovascular: Patient currently hemodynamically stable at this time.  Will evaluate for CHF exacerbation given patient's comorbidities.  Check troponins and proBNP level.  Pending transthoracic echocardiogram for assessment of cardiac function.  Maintain MAP greater than 65.  Can continue with stress dose steroids.  Pulmonary: Patient admitted for acute hypercapnic respiratory failure due to multifocal pneumonia.  Given patient's elevated bicarbonate level most likely chronic retainer of CO2.  Patient most likely has undiagnosed obesity hypoventilation syndrome.  Titrate ventilator settings based on arterial blood gas.  Maintain tidal volumes between 6 to 8 mL/kg based on ideal body weight.  Spontaneous breathing trials as tolerated.  Continue with broad-spectrum antibiotics for presumed pneumonia.  Check sputum culture and full panel respiratory viral panel.  GI: N.p.o. for now.  Can start Protonix for GI prophylaxis.  Monitor for bowel movements.  Renal: Patient with creatinine of 1.39 unknown baseline.  Continue to monitor electrolytes replete as needed.  Strict I's and O's.  Can continue with diuresis with Lasix 40 mg IV once a day  ID: Continue with broad-spectrum antibiotics with cefepime, azithromycin and vancomycin.  Check blood cultures, sputum culture, urine Legionella, strep antigen urine, MRSA swab, respiratory viral panel.  Endo: Fingersticks every 6 hours while patient is n.p.o. check A1c and TSH in the morning  Heme: Heparin subcu for DVT prophylaxis.  Ethics: Patient is full code

## 2025-02-24 NOTE — PATIENT PROFILE ADULT - CONTRAINDICATIONS & PRECAUTIONS (SELECT ALL THAT APPLY)
New to discuss, uncontrolled problem.  She reported 1 week history of pain involving the right upper side of his chest and radiating to his shoulder.  The pain is sharp/burning in nature, described as a burning stomach after eating a pizza.  It typically flares up after eating and it improves after couple of hours.  It is not related to exertion and not associated with shortness of breath, palpitations or diaphoresis.  He is not noted to be a smoker, he has a positive family history of cardiac death in his father at the age of 50s.  He reported no nausea, vomiting, diarrhea, or constipation.  Denies NSAIDs use apart from Ibuprofen once a month in average.     none...

## 2025-02-24 NOTE — ED PROVIDER NOTE - OBJECTIVE STATEMENT
40 y/o M with PMHx of morbid obesity, papilledema of b/l eyes, PAF, HTN presents to the ED c/o acute on chronic SOB, b/l LE edema, and new onset abd swelling and distention. Pt states he has been having SOB and b/l LE swelling since October. Endorses a greater than 50 lb weight gain over the past few months. States he is having difficulty ambulating at home. Pt only on a diuretic (which he was prescribed in October), but states he just ran out of his prescription. Per EMS, pt found to be hypoxic to the 70s on RA, so they placed him on 6L NC.

## 2025-02-24 NOTE — ED ADULT NURSE NOTE - OBJECTIVE STATEMENT
pt presents to Ed with complaints of shortness of breath. per EMS, pt had O2 saturation in 70s prior to arrival. endorses hx of CHF with increased shortness or breath, leg swelling and swelling to lower abdomen. endorses increasing difficulty since October but significant worsening x a few weeks. 18 g placed to left AC and 20 g placed to right FA. labs sent. EKG performed. cardiac and VS monitoring initiated.

## 2025-02-24 NOTE — H&P ADULT - NSHPLABSRESULTS_GEN_ALL_CORE
CBC:            11.9   13.23 )-----------( 188      ( 02-24-25 @ 18:19 )             44.2       Chem:         ( 02-24-25 @ 18:19 )    138  |  98  |  12  ----------------------------<  123[H]  4.5   |  38[H]  |  1.39[H]      Liver Functions: ( 02-24-25 @ 18:19 )  Alb: 2.6 g/dL / Pro: 7.7 gm/dL / ALK PHOS: 62 U/L / ALT: 70 U/L / AST: 56 U/L / GGT: x         Type & Screen:   Bilirubin: (02-24-25 @ 18:19)  Direct: x  / Indirect: x  / Total: 1.3

## 2025-02-24 NOTE — H&P ADULT - ASSESSMENT
Assessment:   Patient is a 39 year old male with PMHx of obesity, paroxysmal afib, HTN presented to ED on 2/24 for hypoxia with oxygen saturation reported to be 70% on RA and b/l leg edema. In ED initiated on HFNC, transitioned to BiPAP due to worsening dyspnea. CT noting b/l PNA and possible cellulitis of the abdominal pannus. Patient's respiratory status rapidly decompensated, patient somnolent with increased secretions. Intubated with anesthesia due to difficult airway. Admitted to critical care for acute on chronic hypercapnic RF.     1. Acute on chronic hypercapnic RF 2/2   2. Multifocal PNA   3. ELLA   4. Obesity     Plan:   Neuro- Diprivan gtt, titrate to maintain RASS 0 to -2.  Respiratory- Intubated on CMV, repeat ABG ordered. Patient with anatomically difficult airway secondary to body habitus. HOB at 30 degrees, suction PRN for increased secretions Duonebs ordered q6 hours, Solumedrol 50 q6. On broad spectrum Abx for PNA. On 100% FiO2, will actively titrate to maintain SpO2 >92%.   Cardiovascular- Patient initially hypertensive in ED, normotensive currently with diprivan gtt. Most recent TTE in October 2024 demonstrates EF of 55%, repeat TTE ordered and BNP. On lasix previously at home, given 40 mg IVP, will observe effect. Maintain MAP >65.   GI- Protonix for stress ulcer prophylaxis. Will trend LFTs on Prop.   Renal- ELLA with Cr of 1.39 from previous of 1.03. Likely 2/2 pre-renal, will continue to monitor electrolytes and replete PRN.    Endo- Maintain -180 in critically ill.   ID- Multifocal PNA and questionable cellulitis on CT. Initiated on broad spectrum abx with cefepime and azithro for atypical coverage. Legionella and Strep Pneumo sent, will f/u results in addition to BC and UC.   Ethics- Attempted to contact family members, life partner's number disconnected and additional contact of father did not answer. Will attempt again.     CRITICAL CARE TIME SPENT: 65  minutes  Time spent evaluating/treating patient with medical issues that pose a high risk for life threatening deterioration, and/or end-organ damage, reviewing data/labs/imaging, discussing case with multidisciplinary team, discussing plan/goals of care with patient/family. Non-inclusive of procedure time. Date of entry of this note is equal to the date of services rendered.     Case Discussed with ICU Attending,  Assessment:   Patient is a 39 year old male with PMHx of obesity, paroxysmal afib, HTN presented to ED on 2/24 for hypoxia with oxygen saturation reported to be 70% on RA and b/l leg edema. In ED initiated on HFNC, transitioned to BiPAP due to worsening dyspnea. CT noting b/l PNA and possible cellulitis of the abdominal pannus. Patient's respiratory status rapidly decompensated, patient somnolent with increased secretions. Intubated with anesthesia due to difficult airway. Admitted to critical care for acute on chronic hypercapnic RF.     1. Acute on chronic hypercapnic RF 2/2   2. Multifocal PNA   3. ELLA   4. Obesity     Plan:   Neuro- Diprivan gtt, titrate to maintain RASS 0 to -2.  Respiratory- Intubated on CMV, repeat ABG ordered. Patient with anatomically difficult airway secondary to body habitus. HOB at 30 degrees, suction PRN for increased secretions Duonebs ordered q6 hours, Solumedrol 50 q6. On broad spectrum Abx for PNA. On 100% FiO2, will actively titrate to maintain SpO2 >92%.   Cardiovascular- Patient initially hypertensive in ED, normotensive currently with diprivan gtt. Most recent TTE in October 2024 demonstrates EF of 55%, repeat TTE ordered and BNP. On lasix previously at home, given 40 mg IVP, will observe effect. Maintain MAP >65.   GI- Protonix for stress ulcer prophylaxis. Will trend LFTs on Prop.   Renal- ELLA with Cr of 1.39 from previous of 1.03. Likely 2/2 pre-renal, will continue to monitor electrolytes and replete PRN.    Endo- Maintain -180 in critically ill.   ID- Multifocal PNA and questionable cellulitis on CT. Initiated on broad spectrum abx with cefepime and azithro for atypical coverage. Legionella and Strep Pneumo sent, will f/u results in addition to BC and UC.   Ethics- Attempted to contact family members, life partner's number disconnected and additional contact of father did not answer. Will attempt again.   Heme- DVT prophylaxis with Heparin SubQ.     CRITICAL CARE TIME SPENT: 65  minutes  Time spent evaluating/treating patient with medical issues that pose a high risk for life threatening deterioration, and/or end-organ damage, reviewing data/labs/imaging, discussing case with multidisciplinary team, discussing plan/goals of care with patient/family. Non-inclusive of procedure time. Date of entry of this note is equal to the date of services rendered.     Case Discussed with ICU Attending,  Assessment:   Patient is a 39 year old male with PMHx of obesity, paroxysmal afib, HTN presented to ED on 2/24 for hypoxia with oxygen saturation reported to be 70% on RA and b/l leg edema. In ED initiated on HFNC, transitioned to BiPAP due to worsening dyspnea. CT noting b/l PNA and possible cellulitis of the abdominal pannus. Patient's respiratory status rapidly decompensated, patient somnolent with increased secretions. Intubated with anesthesia due to difficult airway. Admitted to critical care for acute on chronic hypercapnic RF.     1. Acute on chronic hypercapnic RF 2/2   2. Multifocal PNA   3. ELLA   4. Obesity     Plan:   Neuro- Diprivan gtt, titrate to maintain RASS 0 to -2.  Respiratory- Intubated on CMV, repeat ABG ordered. Patient with anatomically difficult airway secondary to body habitus. HOB at 30 degrees, suction PRN for increased secretions Duonebs ordered q6 hours, Solumedrol 40 q8. On broad spectrum Abx for PNA. On 100% FiO2, will actively titrate to maintain SpO2 >92%.   Cardiovascular- Patient initially hypertensive in ED, normotensive currently with diprivan gtt. Most recent TTE in October 2024 demonstrates EF of 55%, repeat TTE ordered and BNP. On lasix previously at home, given 40 mg IVP, will observe effect. Maintain MAP >65.   GI- Protonix for stress ulcer prophylaxis. Will trend LFTs on Prop.   Renal- ELLA with Cr of 1.39 from previous of 1.03. Likely 2/2 pre-renal, will continue to monitor electrolytes and replete PRN.  Strict I&Os.   Endo- Maintain -180 in critically ill.   ID- Multifocal PNA and questionable cellulitis on CT. Initiated on broad spectrum abx with cefepime and azithro for atypical coverage. Legionella and Strep Pneumo sent, will f/u results in addition to BC and UC.   Ethics- Attempted to contact family members, life partner's number disconnected and additional contact of father did not answer. Will attempt again.   Heme- DVT prophylaxis with Heparin SubQ.     CRITICAL CARE TIME SPENT: 65  minutes  Time spent evaluating/treating patient with medical issues that pose a high risk for life threatening deterioration, and/or end-organ damage, reviewing data/labs/imaging, discussing case with multidisciplinary team, discussing plan/goals of care with patient/family. Non-inclusive of procedure time. Date of entry of this note is equal to the date of services rendered.     Case Discussed with ICU Attending,  Assessment:   Patient is a 39 year old male with PMHx of obesity, paroxysmal afib, HTN presented to ED on 2/24 for hypoxia with oxygen saturation reported to be 70% on RA and b/l leg edema. In ED initiated on HFNC, transitioned to BiPAP due to worsening dyspnea. CT noting b/l PNA and possible cellulitis of the abdominal pannus. Patient's respiratory status rapidly decompensated, patient somnolent with increased secretions. Intubated with anesthesia due to difficult airway. Admitted to critical care for acute on chronic hypercapnic RF.     Acute hypoxic and hypercapnic respiratory failure  CO2 narcosis  Multifocal PNA   Aspiration pneumonitis  ELLA   Leukocytosis  Cellulitis      Plan:   Neuro- Obtunded likely from CO2 narcosis. Sedated on vent with propofol gtt. Added fentanyl gtt for added vent synchrony. Required paralytic with IVP succinylcholine. Actively titrate sedative to maintain RASS -4 to -5. Will consider IVP rocuronium for added paralytic effect if desynchronous.  Respiratory- Intubated on CMV settings, repeat ABG ordered. Patient with anatomically difficult airway secondary to body habitus requiring intubation via anesthesia. HOB at 30 degrees. Aggressive pulmonary toilet for increased secretions. Maintain Ppeak <40. 3 rounds of nebs, then duonebs ordered q6 hours ATC, Solumedrol 40 q8h. On 100% FiO2, will actively titrate to maintain SpO2 >92%. Increase PEEP for added positive pressure as tolerated. Increased RR and  for hypercapnia compensation.  Cardiovascular- Patient initially hypertensive in ED, normotensive currently with diprivan gtt likely iatrogenic. Most recent TTE in October 2024 demonstrates EF of 55%, repeat TTE ordered and BNP. On lasix previously at home, given 40 mg IVP, Will continue daily. Actively maintain MAP >65. Elevated troponin noted likely demand ischemia, will trend. EKG normal  GI- Protonix for stress ulcer prophylaxis. Will trend LFTs on Prop. NGT to LIWS. Monitor output  Renal- ELLA with Cr of 1.39 from previous of 1.03, possibly ATN. Will continue to monitor electrolytes and replete PRN to maintain K>4, Mg>2, Phos>3.  Strict I&Os to maintain UOP >0.5 cc/kg/hr.  Endo- Maintain -180 in critically ill. Follow up HgbA1c and TSH levels.  ID- Multifocal PNA and questionable cellulitis on CT. Initiated on broad spectrum abx with cefepime and azithro for atypical coverage. Legionella and Strep Pneumo sent, will f/u results in addition to BC and UC.   Heme- DVT prophylaxis with Heparin SubQ.   Ethics- Attempted to contact family members, life partner's number disconnected and additional contact of father did not answer. Will attempt again. Full code      CRITICAL CARE TIME SPENT: 90  minutes  Time spent evaluating/treating patient with medical issues that pose a high risk for life threatening deterioration, and/or end-organ damage, reviewing data/labs/imaging, discussing case with multidisciplinary team, discussing plan/goals of care with patient/family. Non-inclusive of procedure time. Date of entry of this note is equal to the date of services rendered.     Case Discussed with eICU intensivist Assessment:   Patient is a 39 year old male with PMHx of obesity, paroxysmal afib, HTN presented to ED on 2/24 for hypoxia with oxygen saturation reported to be 70% on RA and b/l leg edema. In ED initiated on HFNC, transitioned to BiPAP due to worsening dyspnea. CT noting b/l PNA and possible cellulitis of the abdominal pannus. Patient's respiratory status rapidly decompensated, patient somnolent with increased secretions. Intubated with anesthesia due to difficult airway. Admitted to critical care for acute on chronic hypercapnic RF.     Acute hypoxic and hypercapnic respiratory failure  CO2 narcosis  Multifocal PNA   Aspiration pneumonitis  ELLA   Leukocytosis  Cellulitis      Plan:   Neuro- Obtunded likely from CO2 narcosis. Sedated on vent with propofol gtt. Added fentanyl gtt for added vent synchrony. Required paralytic with IVP succinylcholine. Actively titrate sedative to maintain RASS -4 to -5. Will consider IVP rocuronium for added paralytic effect if desynchronous.  Respiratory- Intubated on CMV settings, repeat ABG ordered. Patient with anatomically difficult airway secondary to body habitus requiring intubation via anesthesia. HOB at 30 degrees. Aggressive pulmonary toilet for increased secretions. Maintain Ppeak <40. 3 rounds of nebs, then duonebs ordered q6 hours ATC, Solumedrol 40 q8h. On 100% FiO2, will actively titrate to maintain SpO2 >92%. Increase PEEP for added positive pressure as tolerated. Increased RR and  for hypercapnia compensation.  Cardiovascular- Patient initially hypertensive in ED, normotensive currently with diprivan gtt likely iatrogenic. Most recent TTE in October 2024 demonstrates EF of 55%, repeat TTE ordered and BNP. On lasix previously at home, given 40 mg IVP, Will continue daily. Actively maintain MAP >65. Elevated troponin noted likely demand ischemia, will trend. EKG normal  GI- Protonix for stress ulcer prophylaxis. Will trend LFTs on Prop. NGT to LIWS. Monitor output  Renal- ELLA with Cr of 1.39 from previous of 1.03, possibly ATN. Will continue to monitor electrolytes and replete PRN to maintain K>4, Mg>2, Phos>3.  Strict I&Os to maintain UOP >0.5 cc/kg/hr.  Endo- Maintain -180 in critically ill. Follow up HgbA1c and TSH levels.  ID- Multifocal PNA and cellulitis on CT. Initiated on broad spectrum abx with cefepime and azithro for atypical coverage. Legionella and Strep Pneumo sent, will f/u results in addition to BC and UC.   Heme- DVT prophylaxis with Heparin SubQ.   Ethics- Attempted to contact family members, life partner's number disconnected and additional contact of father did not answer. Will attempt again. Full code      CRITICAL CARE TIME SPENT: 90  minutes  Time spent evaluating/treating patient with medical issues that pose a high risk for life threatening deterioration, and/or end-organ damage, reviewing data/labs/imaging, discussing case with multidisciplinary team, discussing plan/goals of care with patient/family. Non-inclusive of procedure time. Date of entry of this note is equal to the date of services rendered.     [facility] Assessment:   Patient is a 39 year old male with PMHx of obesity, paroxysmal afib, HTN presented to ED on 2/24 for hypoxia with oxygen saturation reported to be 70% on RA and b/l leg edema. In ED initiated on HFNC, transitioned to BiPAP due to worsening dyspnea. CT noting b/l PNA and possible cellulitis of the abdominal pannus. Patient's respiratory status rapidly decompensated, patient somnolent with increased secretions. Intubated with anesthesia due to difficult airway. Admitted to critical care for acute on chronic hypercapnic RF.     Acute hypoxic and hypercapnic respiratory failure  CO2 narcosis  Multifocal PNA   Aspiration pneumonitis  ARDS  ELLA   Leukocytosis  Cellulitis      Plan:   Neuro- Obtunded likely from CO2 narcosis. Sedated on vent with propofol gtt. Added fentanyl gtt for added vent synchrony. Required paralytic with IVP succinylcholine. Actively titrate sedative to maintain RASS -4 to -5. Will consider IVP rocuronium for added paralytic effect if desynchronous.    Respiratory- Intubated on CMV settings, repeat ABG ordered. Patient with anatomically difficult airway secondary to body habitus requiring intubation via anesthesia. HOB at 30 degrees. Aggressive pulmonary toilet for increased secretions. Maintain Ppeak <40. 3 rounds of nebs, then duonebs ordered q6 hours ATC, Solumedrol 40 q8h. On 100% FiO2, will actively titrate to maintain SpO2 >92%. ABG noting low P/F ratio less than 100, concerning for severe ARDs. Increase PEEP for added positive pressure as tolerated given low PO2 with FiO2 100. Increased RR and TV at 500 for severe hypercapnia on admission, but can allow some permissive hypercapnia.    Cardiovascular- Patient initially hypertensive in ED, normotensive currently with diprivan gtt likely iatrogenic. Most recent TTE in October 2024 demonstrates EF of 55%, repeat TTE ordered and BNP. Actively maintain MAP >65. Elevated troponin noted likely demand ischemia, will trend. EKG normal    GI- Protonix for stress ulcer prophylaxis. Will trend LFTs on Prop. NGT to LIWS. Monitor output    Renal- ELLA with Cr of 1.39 from previous of 1.03, possibly ATN. Will continue to monitor electrolytes and replete PRN to maintain K>4, Mg>2, Phos>3.  Strict I&Os to maintain UOP >0.5 cc/kg/hr. On lasix daily and given IVP dose on admission, but bicarb elevated, will further diurese with Diamox.    Endo- Maintain -180 in critically ill. Follow up HgbA1c and TSH levels.    ID- Multifocal PNA and cellulitis on CT. Initiated on broad spectrum abx with cefepime and azithro for atypical coverage. Legionella and Strep Pneumo sent, will f/u results in addition to BC and UC.     Heme- DVT prophylaxis with Heparin SubQ.     Ethics- Patient family updated. Full code      CRITICAL CARE TIME SPENT: 90  minutes  Time spent evaluating/treating patient with medical issues that pose a high risk for life threatening deterioration, and/or end-organ damage, reviewing data/labs/imaging, discussing case with multidisciplinary team, discussing plan/goals of care with patient/family. Non-inclusive of procedure time. Date of entry of this note is equal to the date of services rendered.     Case Discussed with eICU intensivist Dr Jacobsen

## 2025-02-24 NOTE — ED PROCEDURE NOTE - PROCEDURE ADDITIONAL DETAILS
200mg IV Ketamine pushed and VL performed with MAC3 blade. Large tongue with copious secretions noted. Attempt by Dr. Zavala aborted and anesthesia paged overhead. Additional attempts by Dr. Gomes with VL unsuccessful due to copious secretions and large tongue. LMA placed to assist with oxygenation. Between each attempt, patient was oxygenated with BVM and nasal cannula at high flow until SpO2 >90%. OPA and suction used intermittently. Patient given 200mg Succinylcholine and intubated with VL by Dr. Gomes to 28cm depth, +breath sounds b/l with -epigastric sounds. X-ray performed showing ETT in good position. Patient to be admitted to ICU - ICU PAs at bedside. 200mg IV Ketamine pushed and VL performed with MAC3 blade. Large tongue with copious secretions noted. Attempt by Dr. Zavala aborted and anesthesia paged overhead. Additional attempts by Dr. Gomes with VL unsuccessful due to copious secretions and large tongue. LMA placed to assist with oxygenation. Between each attempt, patient was oxygenated with BVM and nasal cannula at high flow until SpO2 >90%. OPA and suction used intermittently. Patient given 200mg Succinylcholine and intubated with DL MAC4 by Dr. Gomes to 28cm depth, +breath sounds b/l with -epigastric sounds. X-ray performed showing ETT in good position. Patient to be admitted to ICU - ICU PAs at bedside.

## 2025-02-24 NOTE — ED PROVIDER NOTE - CLINICAL SUMMARY MEDICAL DECISION MAKING FREE TEXT BOX
39-year-old male presenting with few months of lower extremity edema now extending up into the abdomen with associated shortness of breath, orthopnea, and dyspnea on exertion.  Patient now mostly nonambulatory due to weight gain and symptoms.  Tachycardic and febrile on arrival concerning for sepsis.  Also have concern for fluid overload, will need to be gentle with IV fluids.  Plan for sepsis workup, CTA chest with runoff to the abdomen and pelvis to further assess volume status or infectious process, symptomatic treatment, anticipate patient will require admission.

## 2025-02-24 NOTE — H&P ADULT - HISTORY OF PRESENT ILLNESS
Patient is a 39 year old male with PMHx of obesity, paroxysmal afib, HTN presented to ED on 2/24 for hypoxia with oxygen saturation reported to be 70% on RA and b/l leg edema. In ED initiated on HFNC, transitioned to BiPAP due to worsening dyspnea. CT noting b/l PNA and possible cellulitis of the abdominal pannus. Patient's respiratory status rapidly decompensated, patient somnolent with increased secretions. Intubated with anesthesia due to difficult airway. Critical care consulted for acute on chronic hypercapnic RF.     Patient evaluated at bedside in ED intubated and sedated. Unable to obtain ROS.  normal... Patient is a 39 year old male with PMHx of obesity, paroxysmal afib not on AC, HTN presented to ED on 2/24 for hypoxia with oxygen saturation reported to be 70% on RA and b/l leg edema on lasix. In ED initiated on HFNC, transitioned to BiPAP due to worsening dyspnea. CT noting b/l PNA and possible cellulitis of the abdominal pannus. Patient's respiratory status rapidly decompensated, patient somnolent with increased secretions. Intubated with anesthesia due to difficult airway. Critical care consulted for acute hypoxic and hypercapnic respiratory failure.     Patient evaluated at bedside in ED intubated and sedated. Unable to obtain ROS.

## 2025-02-24 NOTE — ED ADULT NURSE NOTE - CHIEF COMPLAINT QUOTE
Pt BIBEMS from home c/o difficulty breathing, b/l lower leg and abd swelling. Pt states the sob has been difficult x 1 month and abd swelling x 1 week. As per EMS pt was 70% on ra, placed on 6L o2 enroute. pt taken directly to trauma. Pt transferred to [facility] and EVS called for bariatric bed.  pt denies chest pain, dizziness or fever.

## 2025-02-24 NOTE — PATIENT PROFILE ADULT - MEDICATIONS/VISITS
HPI    Patient here with mom for acc ET f/u  Mom states he has been doing well with patching and keeping glasses on all   the time. Patching 2-3 days a week for 2 hours at a time.   Last edited by Callie Dumont MD on 6/8/2023  8:43 AM.        ROS    Positive for: Eyes  Negative for: Constitutional  Last edited by Callie Dumont MD on 6/7/2023  2:19 PM.        Assessment /Plan     For exam results, see Encounter Report.    Accommodative esotropia    Amblyopia, left    History of strabismus surgery      Doing well in glasses with great binocularity   Still with minor amblyopia - worsened after stopping patching last time - continue with current regimen     RTC 6 months sooner PRN                     no

## 2025-02-24 NOTE — CONSULT NOTE ADULT - CRITICAL CARE ATTENDING COMMENT
This patient is critically ill. Plan discussed and reviewed with PA/NP Staci Henderson. I have personally provided 60 minutes of critical care time. This time includes Medical management as above, reviewing chart and coordinating care with primary team/staff, as well as reviewing vitals, radiology, medication list, recent labs, and prior records. This time excludes teaching. Service and consultation provided remotely via TeleICU service.       Case discussed with Physician/ACP:  Patient located at: [facility]   TeleICU Services  located at [facility] Program: [address], [place] for the visit    Visit performed via Telehealth which precludes Physical examination, visual evaluation performed. Physical examination as per bedside clinical team (Physician, ACP, and/or Resident)- pertinent examination findings discussed in detail; all vitals signs, lab, radiographic, and patient documents reviewed.     CRITICAL CARE TIME SPENT: 60 minutes

## 2025-02-24 NOTE — PATIENT PROFILE ADULT - FALL HARM RISK - RISK INTERVENTIONS

## 2025-02-24 NOTE — ED PROVIDER NOTE - PHYSICAL EXAMINATION
GENERAL: A&Ox4, non-toxic appearing, no acute distress  HEENT: NCAT, EOMI, oral mucosa moist, normal conjunctiva  RESP: Diminished diffusely, slight increased work of breathing, pCO2 70%, SpO2 90% on nasal cannula, speaking in partial sentences  CV: Tachycardic, no murmurs/rubs/gallops, significant lower extremity edema  ABDOMEN: soft, non-tender, protuberant, distended with pitting, no guarding, no rebound tenderness  MSK: no visible deformities  NEURO: no focal sensory or motor deficits, CN 2-12 grossly intact  SKIN: Tactile fever, normal color, well perfused, no rash  PSYCH: Appropriate affect GENERAL: A&Ox4, non-toxic appearing, no acute distress  HEENT: NCAT, EOMI, oral mucosa moist, normal conjunctiva  RESP: Diminished diffusely, slight increased work of breathing, pCO2 70%, SpO2 90% on nasal cannula, speaking in partial sentences  CV: Tachycardic, no murmurs/rubs/gallops, significant lower extremity edema  ABDOMEN: soft, non-tender, protuberant, distended with pitting, no guarding, no rebound tenderness  MSK: no visible deformities  NEURO: no focal sensory or motor deficits, CN 2-12 grossly intact  SKIN: Tactile fever, normal color, well perfused, indurated L lower abdomen w/ overlying erythema  PSYCH: Appropriate affect

## 2025-02-24 NOTE — ED ADULT NURSE REASSESSMENT NOTE - NS ED NURSE REASSESS COMMENT FT1
Received report from JAMILA FLOREZ. Pt placed in view of the nurses station, currently on a non rebreather satting 94%. Pt to be placed on BIPAP, respiratory at bedside. VS as charted. Pending further MD orders. Pt family at bedside.

## 2025-02-25 ENCOUNTER — RESULT REVIEW (OUTPATIENT)
Age: 40
End: 2025-02-25

## 2025-02-25 ENCOUNTER — TRANSCRIPTION ENCOUNTER (OUTPATIENT)
Age: 40
End: 2025-02-25

## 2025-02-25 ENCOUNTER — APPOINTMENT (OUTPATIENT)
Dept: CARDIOLOGY | Facility: CLINIC | Age: 40
End: 2025-02-25

## 2025-02-25 VITALS — TEMPERATURE: 100 F | HEART RATE: 89 BPM | OXYGEN SATURATION: 97 % | RESPIRATION RATE: 26 BRPM

## 2025-02-25 LAB
A1C WITH ESTIMATED AVERAGE GLUCOSE RESULT: 6.7 % — HIGH (ref 4–5.6)
ALBUMIN SERPL ELPH-MCNC: 2.3 G/DL — LOW (ref 3.3–5)
ALBUMIN SERPL ELPH-MCNC: 2.4 G/DL — LOW (ref 3.3–5)
ALP SERPL-CCNC: 55 U/L — SIGNIFICANT CHANGE UP (ref 40–120)
ALP SERPL-CCNC: 56 U/L — SIGNIFICANT CHANGE UP (ref 40–120)
ALT FLD-CCNC: 61 U/L — SIGNIFICANT CHANGE UP (ref 12–78)
ALT FLD-CCNC: 63 U/L — SIGNIFICANT CHANGE UP (ref 12–78)
ANION GAP SERPL CALC-SCNC: 3 MMOL/L — LOW (ref 5–17)
ANION GAP SERPL CALC-SCNC: 3 MMOL/L — LOW (ref 5–17)
ANION GAP SERPL CALC-SCNC: 4 MMOL/L — LOW (ref 5–17)
AST SERPL-CCNC: 44 U/L — HIGH (ref 15–37)
AST SERPL-CCNC: 49 U/L — HIGH (ref 15–37)
BASE EXCESS BLDA CALC-SCNC: 2.2 MMOL/L — SIGNIFICANT CHANGE UP (ref -2–3)
BASE EXCESS BLDA CALC-SCNC: 2.8 MMOL/L — SIGNIFICANT CHANGE UP (ref -2–3)
BASE EXCESS BLDA CALC-SCNC: 4.9 MMOL/L — HIGH (ref -2–3)
BASE EXCESS BLDA CALC-SCNC: 5 MMOL/L — HIGH (ref -2–3)
BASE EXCESS BLDA CALC-SCNC: 5.1 MMOL/L — HIGH (ref -2–3)
BASE EXCESS BLDA CALC-SCNC: 6.8 MMOL/L — HIGH (ref -2–3)
BASE EXCESS BLDA CALC-SCNC: 7.1 MMOL/L — HIGH (ref -2–3)
BILIRUB SERPL-MCNC: 1 MG/DL — SIGNIFICANT CHANGE UP (ref 0.2–1.2)
BILIRUB SERPL-MCNC: 1.1 MG/DL — SIGNIFICANT CHANGE UP (ref 0.2–1.2)
BLD GP AB SCN SERPL QL: SIGNIFICANT CHANGE UP
BLD GP AB SCN SERPL QL: SIGNIFICANT CHANGE UP
BLOOD GAS COMMENTS ARTERIAL: SIGNIFICANT CHANGE UP
BUN SERPL-MCNC: 15 MG/DL — SIGNIFICANT CHANGE UP (ref 7–23)
BUN SERPL-MCNC: 15 MG/DL — SIGNIFICANT CHANGE UP (ref 7–23)
BUN SERPL-MCNC: 20 MG/DL — SIGNIFICANT CHANGE UP (ref 7–23)
CALCIUM SERPL-MCNC: 8.2 MG/DL — LOW (ref 8.5–10.1)
CHLORIDE SERPL-SCNC: 100 MMOL/L — SIGNIFICANT CHANGE UP (ref 96–108)
CHLORIDE SERPL-SCNC: 97 MMOL/L — SIGNIFICANT CHANGE UP (ref 96–108)
CHLORIDE SERPL-SCNC: 98 MMOL/L — SIGNIFICANT CHANGE UP (ref 96–108)
CO2 SERPL-SCNC: 32 MMOL/L — HIGH (ref 22–31)
CO2 SERPL-SCNC: 33 MMOL/L — HIGH (ref 22–31)
CO2 SERPL-SCNC: 33 MMOL/L — HIGH (ref 22–31)
CREAT SERPL-MCNC: 1.46 MG/DL — HIGH (ref 0.5–1.3)
CREAT SERPL-MCNC: 1.56 MG/DL — HIGH (ref 0.5–1.3)
CREAT SERPL-MCNC: 1.71 MG/DL — HIGH (ref 0.5–1.3)
EGFR: 52 ML/MIN/1.73M2 — LOW
EGFR: 58 ML/MIN/1.73M2 — LOW
EGFR: 63 ML/MIN/1.73M2 — SIGNIFICANT CHANGE UP
ESTIMATED AVERAGE GLUCOSE: 146 MG/DL — HIGH (ref 68–114)
GAS PNL BLDA: SIGNIFICANT CHANGE UP
GAS PNL BLDA: SIGNIFICANT CHANGE UP
GLUCOSE BLDC GLUCOMTR-MCNC: 176 MG/DL — HIGH (ref 70–99)
GLUCOSE SERPL-MCNC: 137 MG/DL — HIGH (ref 70–99)
GLUCOSE SERPL-MCNC: 144 MG/DL — HIGH (ref 70–99)
GLUCOSE SERPL-MCNC: 181 MG/DL — HIGH (ref 70–99)
GRAM STN FLD: ABNORMAL
HCO3 BLDA-SCNC: 29 MMOL/L — HIGH (ref 21–28)
HCO3 BLDA-SCNC: 29 MMOL/L — HIGH (ref 21–28)
HCO3 BLDA-SCNC: 30 MMOL/L — HIGH (ref 21–28)
HCO3 BLDA-SCNC: 32 MMOL/L — HIGH (ref 21–28)
HCO3 BLDA-SCNC: 34 MMOL/L — HIGH (ref 21–28)
HCT VFR BLD CALC: 41.4 % — SIGNIFICANT CHANGE UP (ref 39–50)
HCT VFR BLD CALC: 42.4 % — SIGNIFICANT CHANGE UP (ref 39–50)
HGB BLD-MCNC: 11.3 G/DL — LOW (ref 13–17)
HGB BLD-MCNC: 11.5 G/DL — LOW (ref 13–17)
LEGIONELLA AG UR QL: NEGATIVE — SIGNIFICANT CHANGE UP
MAGNESIUM SERPL-MCNC: 2.1 MG/DL — SIGNIFICANT CHANGE UP (ref 1.6–2.6)
MAGNESIUM SERPL-MCNC: 2.2 MG/DL — SIGNIFICANT CHANGE UP (ref 1.6–2.6)
MCHC RBC-ENTMCNC: 22.7 PG — LOW (ref 27–34)
MCHC RBC-ENTMCNC: 22.8 PG — LOW (ref 27–34)
MCHC RBC-ENTMCNC: 27.1 G/DL — LOW (ref 32–36)
MCHC RBC-ENTMCNC: 27.3 G/DL — LOW (ref 32–36)
MCV RBC AUTO: 83.3 FL — SIGNIFICANT CHANGE UP (ref 80–100)
MCV RBC AUTO: 84.1 FL — SIGNIFICANT CHANGE UP (ref 80–100)
MRSA PCR RESULT.: SIGNIFICANT CHANGE UP
NRBC # BLD AUTO: 0.13 K/UL — HIGH (ref 0–0)
NRBC # BLD AUTO: 0.15 K/UL — HIGH (ref 0–0)
NRBC # FLD: 0.13 K/UL — HIGH (ref 0–0)
NRBC # FLD: 0.15 K/UL — HIGH (ref 0–0)
NRBC BLD AUTO-RTO: 0 /100 WBCS — SIGNIFICANT CHANGE UP (ref 0–0)
NRBC BLD AUTO-RTO: 0 /100 WBCS — SIGNIFICANT CHANGE UP (ref 0–0)
PCO2 BLDA: 44 MMHG — SIGNIFICANT CHANGE UP (ref 35–48)
PCO2 BLDA: 52 MMHG — HIGH (ref 35–48)
PCO2 BLDA: 54 MMHG — HIGH (ref 35–48)
PCO2 BLDA: 56 MMHG — HIGH (ref 35–48)
PCO2 BLDA: 57 MMHG — HIGH (ref 35–48)
PCO2 BLDA: 57 MMHG — HIGH (ref 35–48)
PCO2 BLDA: 75 MMHG — CRITICAL HIGH (ref 35–48)
PH BLDA: 7.27 — LOW (ref 7.35–7.45)
PH BLDA: 7.34 — LOW (ref 7.35–7.45)
PH BLDA: 7.36 — SIGNIFICANT CHANGE UP (ref 7.35–7.45)
PH BLDA: 7.36 — SIGNIFICANT CHANGE UP (ref 7.35–7.45)
PH BLDA: 7.38 — SIGNIFICANT CHANGE UP (ref 7.35–7.45)
PH BLDA: 7.39 — SIGNIFICANT CHANGE UP (ref 7.35–7.45)
PH BLDA: 7.44 — SIGNIFICANT CHANGE UP (ref 7.35–7.45)
PHOSPHATE SERPL-MCNC: 3.2 MG/DL — SIGNIFICANT CHANGE UP (ref 2.5–4.5)
PHOSPHATE SERPL-MCNC: 4 MG/DL — SIGNIFICANT CHANGE UP (ref 2.5–4.5)
PLATELET # BLD AUTO: 163 K/UL — SIGNIFICANT CHANGE UP (ref 150–400)
PLATELET # BLD AUTO: 169 K/UL — SIGNIFICANT CHANGE UP (ref 150–400)
PMV BLD: 10.6 FL — SIGNIFICANT CHANGE UP (ref 7–13)
PMV BLD: 11.5 FL — SIGNIFICANT CHANGE UP (ref 7–13)
PO2 BLDA: 103 MMHG — SIGNIFICANT CHANGE UP (ref 83–108)
PO2 BLDA: 151 MMHG — HIGH (ref 83–108)
PO2 BLDA: 49 MMHG — CRITICAL LOW (ref 83–108)
PO2 BLDA: 57 MMHG — LOW (ref 83–108)
PO2 BLDA: 58 MMHG — LOW (ref 83–108)
PO2 BLDA: 58 MMHG — LOW (ref 83–108)
PO2 BLDA: 67 MMHG — LOW (ref 83–108)
POTASSIUM SERPL-MCNC: 5 MMOL/L — SIGNIFICANT CHANGE UP (ref 3.5–5.3)
POTASSIUM SERPL-MCNC: 5.2 MMOL/L — SIGNIFICANT CHANGE UP (ref 3.5–5.3)
POTASSIUM SERPL-MCNC: 5.3 MMOL/L — SIGNIFICANT CHANGE UP (ref 3.5–5.3)
POTASSIUM SERPL-SCNC: 5 MMOL/L — SIGNIFICANT CHANGE UP (ref 3.5–5.3)
POTASSIUM SERPL-SCNC: 5.2 MMOL/L — SIGNIFICANT CHANGE UP (ref 3.5–5.3)
POTASSIUM SERPL-SCNC: 5.3 MMOL/L — SIGNIFICANT CHANGE UP (ref 3.5–5.3)
PROCALCITONIN SERPL-MCNC: 0.29 NG/ML — HIGH (ref 0.02–0.1)
PROCALCITONIN SERPL-MCNC: 0.37 NG/ML — HIGH (ref 0.02–0.1)
PROT SERPL-MCNC: 7 GM/DL — SIGNIFICANT CHANGE UP (ref 6–8.3)
PROT SERPL-MCNC: 7.3 GM/DL — SIGNIFICANT CHANGE UP (ref 6–8.3)
RAPID RVP RESULT: SIGNIFICANT CHANGE UP
RBC # BLD: 4.97 M/UL — SIGNIFICANT CHANGE UP (ref 4.2–5.8)
RBC # BLD: 5.04 M/UL — SIGNIFICANT CHANGE UP (ref 4.2–5.8)
RBC # FLD: 19.4 % — HIGH (ref 10.3–14.5)
RBC # FLD: 19.4 % — HIGH (ref 10.3–14.5)
S AUREUS DNA NOSE QL NAA+PROBE: DETECTED
SAO2 % BLDA: 83 % — LOW (ref 94–98)
SAO2 % BLDA: 91 % — LOW (ref 94–98)
SAO2 % BLDA: 93 % — LOW (ref 94–98)
SAO2 % BLDA: 99 % — HIGH (ref 94–98)
SAO2 % BLDA: 99 % — HIGH (ref 94–98)
SARS-COV-2 RNA SPEC QL NAA+PROBE: SIGNIFICANT CHANGE UP
SODIUM SERPL-SCNC: 133 MMOL/L — LOW (ref 135–145)
SODIUM SERPL-SCNC: 134 MMOL/L — LOW (ref 135–145)
SODIUM SERPL-SCNC: 136 MMOL/L — SIGNIFICANT CHANGE UP (ref 135–145)
SPECIMEN SOURCE: SIGNIFICANT CHANGE UP
TROPONIN I, HIGH SENSITIVITY RESULT: 128.49 NG/L — HIGH
TROPONIN I, HIGH SENSITIVITY RESULT: 162.25 NG/L — HIGH
TSH SERPL-MCNC: 0.79 UU/ML — SIGNIFICANT CHANGE UP (ref 0.34–4.82)
WBC # BLD: 16.37 K/UL — HIGH (ref 3.8–10.5)
WBC # BLD: 17.34 K/UL — HIGH (ref 3.8–10.5)
WBC # FLD AUTO: 16.37 K/UL — HIGH (ref 3.8–10.5)
WBC # FLD AUTO: 17.34 K/UL — HIGH (ref 3.8–10.5)

## 2025-02-25 PROCEDURE — 93970 EXTREMITY STUDY: CPT | Mod: 26

## 2025-02-25 PROCEDURE — 93010 ELECTROCARDIOGRAM REPORT: CPT

## 2025-02-25 PROCEDURE — 71045 X-RAY EXAM CHEST 1 VIEW: CPT | Mod: 26

## 2025-02-25 PROCEDURE — 71045 X-RAY EXAM CHEST 1 VIEW: CPT | Mod: 26,77

## 2025-02-25 PROCEDURE — 93306 TTE W/DOPPLER COMPLETE: CPT | Mod: 26

## 2025-02-25 PROCEDURE — 99291 CRITICAL CARE FIRST HOUR: CPT

## 2025-02-25 PROCEDURE — 99292 CRITICAL CARE ADDL 30 MIN: CPT

## 2025-02-25 RX ORDER — CISATRACURIUM BESYLATE 10 MG/5ML
3 INJECTION, SOLUTION INTRAVENOUS
Qty: 200 | Refills: 0 | Status: DISCONTINUED | OUTPATIENT
Start: 2025-02-25 | End: 2025-02-26

## 2025-02-25 RX ORDER — LINEZOLID 2 MG/ML
INJECTION, SOLUTION INTRAVENOUS
Refills: 0 | Status: DISCONTINUED | OUTPATIENT
Start: 2025-02-25 | End: 2025-02-26

## 2025-02-25 RX ORDER — ACETAZOLAMIDE 250 MG/1
500 TABLET ORAL ONCE
Refills: 0 | Status: COMPLETED | OUTPATIENT
Start: 2025-02-25 | End: 2025-02-25

## 2025-02-25 RX ORDER — ACETAMINOPHEN 500 MG/5ML
1000 LIQUID (ML) ORAL ONCE
Refills: 0 | Status: COMPLETED | OUTPATIENT
Start: 2025-02-25 | End: 2025-02-25

## 2025-02-25 RX ORDER — ORAL SEMAGLUTIDE 14 MG/1
0 TABLET ORAL
Refills: 0 | DISCHARGE

## 2025-02-25 RX ORDER — CISATRACURIUM BESYLATE 10 MG/5ML
15 INJECTION, SOLUTION INTRAVENOUS ONCE
Refills: 0 | Status: COMPLETED | OUTPATIENT
Start: 2025-02-25 | End: 2025-02-25

## 2025-02-25 RX ORDER — NOREPINEPHRINE BITARTRATE 8 MG
0.05 SOLUTION INTRAVENOUS
Qty: 8 | Refills: 0 | Status: DISCONTINUED | OUTPATIENT
Start: 2025-02-25 | End: 2025-02-26

## 2025-02-25 RX ORDER — AZITHROMYCIN 250 MG
500 CAPSULE ORAL ONCE
Refills: 0 | Status: COMPLETED | OUTPATIENT
Start: 2025-02-25 | End: 2025-02-25

## 2025-02-25 RX ORDER — FENTANYL CITRATE-0.9 % NACL/PF 100MCG/2ML
1 SYRINGE (ML) INTRAVENOUS
Qty: 2500 | Refills: 0 | Status: DISCONTINUED | OUTPATIENT
Start: 2025-02-25 | End: 2025-02-26

## 2025-02-25 RX ORDER — LINEZOLID 2 MG/ML
600 INJECTION, SOLUTION INTRAVENOUS EVERY 12 HOURS
Refills: 0 | Status: DISCONTINUED | OUTPATIENT
Start: 2025-02-26 | End: 2025-02-26

## 2025-02-25 RX ORDER — HEPARIN SODIUM 1000 [USP'U]/ML
7500 INJECTION INTRAVENOUS; SUBCUTANEOUS EVERY 8 HOURS
Refills: 0 | Status: DISCONTINUED | OUTPATIENT
Start: 2025-02-25 | End: 2025-02-26

## 2025-02-25 RX ORDER — FUROSEMIDE 10 MG/ML
1 INJECTION INTRAMUSCULAR; INTRAVENOUS
Refills: 0 | DISCHARGE

## 2025-02-25 RX ORDER — AZITHROMYCIN 250 MG
500 CAPSULE ORAL EVERY 24 HOURS
Refills: 0 | Status: DISCONTINUED | OUTPATIENT
Start: 2025-02-26 | End: 2025-02-26

## 2025-02-25 RX ORDER — AZITHROMYCIN 250 MG
CAPSULE ORAL
Refills: 0 | Status: DISCONTINUED | OUTPATIENT
Start: 2025-02-25 | End: 2025-02-26

## 2025-02-25 RX ORDER — FUROSEMIDE 10 MG/ML
40 INJECTION INTRAMUSCULAR; INTRAVENOUS ONCE
Refills: 0 | Status: COMPLETED | OUTPATIENT
Start: 2025-02-25 | End: 2025-02-25

## 2025-02-25 RX ORDER — HEPARIN SODIUM 1000 [USP'U]/ML
10000 INJECTION INTRAVENOUS; SUBCUTANEOUS EVERY 6 HOURS
Refills: 0 | Status: DISCONTINUED | OUTPATIENT
Start: 2025-02-25 | End: 2025-02-26

## 2025-02-25 RX ORDER — HEPARIN SODIUM 1000 [USP'U]/ML
5000 INJECTION INTRAVENOUS; SUBCUTANEOUS EVERY 6 HOURS
Refills: 0 | Status: DISCONTINUED | OUTPATIENT
Start: 2025-02-25 | End: 2025-02-26

## 2025-02-25 RX ORDER — HEPARIN SODIUM 1000 [USP'U]/ML
INJECTION INTRAVENOUS; SUBCUTANEOUS
Qty: 25000 | Refills: 0 | Status: DISCONTINUED | OUTPATIENT
Start: 2025-02-25 | End: 2025-02-26

## 2025-02-25 RX ORDER — FUROSEMIDE 10 MG/ML
80 INJECTION INTRAMUSCULAR; INTRAVENOUS ONCE
Refills: 0 | Status: COMPLETED | OUTPATIENT
Start: 2025-02-25 | End: 2025-02-25

## 2025-02-25 RX ORDER — LINEZOLID 2 MG/ML
600 INJECTION, SOLUTION INTRAVENOUS ONCE
Refills: 0 | Status: COMPLETED | OUTPATIENT
Start: 2025-02-25 | End: 2025-02-25

## 2025-02-25 RX ADMIN — LINEZOLID 300 MILLIGRAM(S): 2 INJECTION, SOLUTION INTRAVENOUS at 16:21

## 2025-02-25 RX ADMIN — Medication 23.9 MICROGRAM(S)/KG/HR: at 23:54

## 2025-02-25 RX ADMIN — FUROSEMIDE 40 MILLIGRAM(S): 10 INJECTION INTRAMUSCULAR; INTRAVENOUS at 13:26

## 2025-02-25 RX ADMIN — PROPOFOL 28.7 MICROGRAM(S)/KG/MIN: 10 INJECTION, EMULSION INTRAVENOUS at 09:49

## 2025-02-25 RX ADMIN — PROPOFOL 28.7 MICROGRAM(S)/KG/MIN: 10 INJECTION, EMULSION INTRAVENOUS at 13:15

## 2025-02-25 RX ADMIN — HEPARIN SODIUM 5000 UNIT(S): 1000 INJECTION INTRAVENOUS; SUBCUTANEOUS at 09:48

## 2025-02-25 RX ADMIN — PROPOFOL 28.7 MICROGRAM(S)/KG/MIN: 10 INJECTION, EMULSION INTRAVENOUS at 16:36

## 2025-02-25 RX ADMIN — Medication 40 MILLIGRAM(S): at 09:49

## 2025-02-25 RX ADMIN — PROPOFOL 28.7 MICROGRAM(S)/KG/MIN: 10 INJECTION, EMULSION INTRAVENOUS at 18:11

## 2025-02-25 RX ADMIN — PROPOFOL 28.7 MICROGRAM(S)/KG/MIN: 10 INJECTION, EMULSION INTRAVENOUS at 14:55

## 2025-02-25 RX ADMIN — Medication 400 MILLIGRAM(S): at 18:30

## 2025-02-25 RX ADMIN — IPRATROPIUM BROMIDE AND ALBUTEROL SULFATE 3 MILLILITER(S): .5; 2.5 SOLUTION RESPIRATORY (INHALATION) at 02:23

## 2025-02-25 RX ADMIN — CEFEPIME 2000 MILLIGRAM(S): 2 INJECTION, POWDER, FOR SOLUTION INTRAVENOUS at 21:07

## 2025-02-25 RX ADMIN — Medication 500 MILLIGRAM(S): at 11:11

## 2025-02-25 RX ADMIN — Medication 71.8 MICROGRAM(S)/KG/HR: at 06:15

## 2025-02-25 RX ADMIN — ACETAZOLAMIDE 500 MILLIGRAM(S): 250 TABLET ORAL at 02:49

## 2025-02-25 RX ADMIN — PROPOFOL 28.7 MICROGRAM(S)/KG/MIN: 10 INJECTION, EMULSION INTRAVENOUS at 23:25

## 2025-02-25 RX ADMIN — Medication 23.9 MICROGRAM(S)/KG/HR: at 15:01

## 2025-02-25 RX ADMIN — PROPOFOL 28.7 MICROGRAM(S)/KG/MIN: 10 INJECTION, EMULSION INTRAVENOUS at 05:30

## 2025-02-25 RX ADMIN — Medication 400 MILLIGRAM(S): at 03:48

## 2025-02-25 RX ADMIN — CEFEPIME 2000 MILLIGRAM(S): 2 INJECTION, POWDER, FOR SOLUTION INTRAVENOUS at 13:19

## 2025-02-25 RX ADMIN — Medication 15 MILLILITER(S): at 21:07

## 2025-02-25 RX ADMIN — HEPARIN SODIUM 7500 UNIT(S): 1000 INJECTION INTRAVENOUS; SUBCUTANEOUS at 21:07

## 2025-02-25 RX ADMIN — CEFEPIME 2000 MILLIGRAM(S): 2 INJECTION, POWDER, FOR SOLUTION INTRAVENOUS at 05:29

## 2025-02-25 RX ADMIN — FUROSEMIDE 80 MILLIGRAM(S): 10 INJECTION INTRAMUSCULAR; INTRAVENOUS at 17:28

## 2025-02-25 RX ADMIN — CISATRACURIUM BESYLATE 15 MILLIGRAM(S): 10 INJECTION, SOLUTION INTRAVENOUS at 15:10

## 2025-02-25 RX ADMIN — PROPOFOL 28.7 MICROGRAM(S)/KG/MIN: 10 INJECTION, EMULSION INTRAVENOUS at 19:49

## 2025-02-25 RX ADMIN — METHYLPREDNISOLONE ACETATE 40 MILLIGRAM(S): 80 INJECTION, SUSPENSION INTRA-ARTICULAR; INTRALESIONAL; INTRAMUSCULAR; SOFT TISSUE at 13:23

## 2025-02-25 RX ADMIN — PROPOFOL 28.7 MICROGRAM(S)/KG/MIN: 10 INJECTION, EMULSION INTRAVENOUS at 08:32

## 2025-02-25 RX ADMIN — Medication 71.8 MICROGRAM(S)/KG/HR: at 02:50

## 2025-02-25 RX ADMIN — IPRATROPIUM BROMIDE AND ALBUTEROL SULFATE 3 MILLILITER(S): .5; 2.5 SOLUTION RESPIRATORY (INHALATION) at 08:35

## 2025-02-25 RX ADMIN — METHYLPREDNISOLONE ACETATE 40 MILLIGRAM(S): 80 INJECTION, SUSPENSION INTRA-ARTICULAR; INTRALESIONAL; INTRAMUSCULAR; SOFT TISSUE at 05:29

## 2025-02-25 RX ADMIN — PROPOFOL 28.7 MICROGRAM(S)/KG/MIN: 10 INJECTION, EMULSION INTRAVENOUS at 07:27

## 2025-02-25 RX ADMIN — METHYLPREDNISOLONE ACETATE 40 MILLIGRAM(S): 80 INJECTION, SUSPENSION INTRA-ARTICULAR; INTRALESIONAL; INTRAMUSCULAR; SOFT TISSUE at 21:07

## 2025-02-25 RX ADMIN — Medication 1000 MILLIGRAM(S): at 05:23

## 2025-02-25 RX ADMIN — PROPOFOL 28.7 MICROGRAM(S)/KG/MIN: 10 INJECTION, EMULSION INTRAVENOUS at 02:50

## 2025-02-25 RX ADMIN — Medication 15 MILLILITER(S): at 09:49

## 2025-02-25 NOTE — PROGRESS NOTE ADULT - ASSESSMENT
39-year-old male with past medical history of morbid obesity, heart failure (unknown kind), hypertension, paroxysmal A-fib not on AC, recent functional decline over the past 1-2 months, recent flulike symptoms (per family), now presents with shortness of breath, unable to sleep, weakness, poor p.o. intake.    He was he was found to be hypoxic into 70s per EMS, placed on 6 L NC    ED initially ordered BiPAP for respiratory failure, subsequently he became obtunded and required intubation - intubation was extremely difficult, eventually intubated by anesthesia on seventh attempt, BMV was performed between attempts to maintain SpO2>90%    CTA chest was negative for PE, shows multifocal pneumonia  CT abdomen pelvis showed hepatomegaly and mild ascites    He received cefepime, azithromycin, vancomycin in the ED  Also received Lasix and Diamox    Admitted to ICU for further care    Persistently hypoxic requiring multiple sedatives to maintain vent synchrony      Problem list:  Acute hypoxic and hypercapnic respiratory failure  ARDS  Multifocal pneumonia  Acute pulmonary edema      Plan:  On propofol, fentanyl drips earlier, RASS -5, weaned off fentanyl drip and slightly lower propofol to assess mental status, however, he had an episode of hypoxia after a coughing event, very sedated with fentanyl, gave a dose of Nimbex, will maintain heavy sedation for vent synchrony  39-year-old male with past medical history of morbid obesity, heart failure (unknown kind), hypertension, paroxysmal A-fib not on AC, recent functional decline over the past 1-2 months, recent flulike symptoms (per family), now presents with shortness of breath, unable to sleep, weakness, poor p.o. intake.    He was he was found to be hypoxic into 70s per EMS, placed on 6 L NC    ED initially ordered BiPAP for respiratory failure, subsequently he became obtunded and required intubation - intubation was extremely difficult, eventually intubated by anesthesia on seventh attempt, BMV was performed between attempts to maintain SpO2>90%    CTA chest was negative for PE, shows multifocal pneumonia  CT abdomen pelvis showed hepatomegaly and mild ascites    He received cefepime, azithromycin, vancomycin in the ED  Also received Lasix and Diamox    Admitted to ICU for further care    Persistently hypoxic requiring multiple sedatives to maintain vent synchrony      Problem list:  Acute hypoxic and hypercapnic respiratory failure  ARDS  Multifocal pneumonia - gram negative, MRSA cannot be ruled out   Severe sepsis (POA)   Acute pulmonary edema      Plan:  On propofol, fentanyl drips earlier, RASS -5, weaned off fentanyl drip and slightly lower propofol to assess mental status, however, he had an episode of hypoxia after a coughing event, very sedated with fentanyl, gave a dose of Nimbex, will maintain heavy sedation for vent synchrony   BP and HR stable, in NSR  TTE shows normal EF at 61%, enlarged RV cavity size and reduced RV systolic function, mild TR  Will maintain net negative fluid status  On vent support, AC volume 22/500/12/100%, peak pressure 27, saturation 93.  Later in the day he had an episode of hypoxia, PEEP increased to 14, tidal volume increased to 550, PPlat 29, repeat gas with improved hypercapnia but persistent hypoxemia - spoke with Dr. Rousseau at [facility], will diurese more and change vent settings, repeat ABG, he may need transfer to [facility] for further care including ECMO  Continue steroids  Keep n.p.o.  Monitor urine output, trend and replete electrolytes as needed, maintain [place]  Empiric antibiotic coverage with cefepime, add azithromycin, given high risk of MRSA pneumonia will add linezolid after discussing with ID.  Follow-up blood cultures, sputum culture, urine antigens  DVT prophylaxis with subcu heparin      Patient critically ill with risk for further decompensation, multiple bedside visits made      Family updated multiple times at bedside on status, events, plan      Sepsis criteria met? - NO

## 2025-02-25 NOTE — PROCEDURE NOTE - NSINDICATIONS_GEN_A_CORE
critical illness/emergency venous access/venous access
arterial puncture to obtain ABG's/blood sampling/critical patient/monitoring purposes

## 2025-02-25 NOTE — PROVIDER CONTACT NOTE (CRITICAL VALUE NOTIFICATION) - TEST AND RESULT REPORTED:
Troponin 144.29
pH 7.26   ,      O2 sat 74  pCO2 91  pO2 48  bicarb 41  base excess 9.9
pO2 49
Abh: 7.27, PCO2: 75, PO2: 67, HcO3: 34

## 2025-02-25 NOTE — PROGRESS NOTE ADULT - SUBJECTIVE AND OBJECTIVE BOX
39y  Male  No Known Allergies    CC: Patient is a 39y old  Male who presents with a chief complaint of Acute hypercapnic and hypoxic respiratory failure     HPI:   39 year old male PMHx of obesity, paroxysmal afib not on AC and HTN who presented to ED with hypoxia at  70% on RA and b/l leg edema on lasix. He was started on HFNC, then  transitioned to BiPAP due to worsening dyspnea. CT noting b/l PNA and possible cellulitis(  abdominal pannus) . H rapidly decompensated form a respiratory perspective and became lethargic requiring Intubation with anesthesia due to difficult airway. Critical care consulted for acute hypoxic and hypercapnic respiratory failure.        PAST MEDICAL & SURGICAL HISTORY:  HTN (hypertension)  Atrial fibrillation  paroxysmal  Papilledema of both eyes  Chronic sinusitis  Morbid obesity    No significant past surgical history    Vital Signs Last 24 Hrs  T(C): 37.8 (25 Feb 2025 21:00), Max: 38.2 (25 Feb 2025 03:00)  T(F): 100 (25 Feb 2025 21:00), Max: 100.8 (25 Feb 2025 03:00)  HR: 88 (25 Feb 2025 21:00) (80 - 116)  BP: 97/54 (25 Feb 2025 18:30) (95/70 - 129/79)  BP(mean): 66 (25 Feb 2025 18:30) (52 - 92)  RR: 26 (25 Feb 2025 21:00) (0 - 28)  SpO2: 85% (25 Feb 2025 21:00) (82% - 97%)    Parameters below as of 25 Feb 2025 21:00  Patient On (Oxygen Delivery Method): ventilator    O2 Concentration (%): 100  ABG - ( 25 Feb 2025 20:57 )  pH, Arterial: 7.36  pH, Blood: x     /  pCO2: 52    /  pO2: 58    / HCO3: 29    / Base Excess: 2.8   /  SaO2: 91          I&O's Summary  24 Feb 2025 07:01  -  25 Feb 2025 07:00  --------------------------------------------------------  IN: 1168 mL / OUT: 1100 mL / NET: 68 mL    25 Feb 2025 07:01  -  25 Feb 2025 21:29  --------------------------------------------------------  IN: 1820.3 mL / OUT: 1550 mL / NET: 270.3 mL      LABS  02-25  133[L]  |  97  |  20  ----------------------------<  181[H]  5.0   |  33[H]  |  1.71[H]  Ca    8.2[L]      25 Feb 2025 13:11  Phos  3.2     02-25  Mg     2.1     02-25  TPro  7.3  /  Alb  2.4[L]  /  TBili  1.0  /  DBili  x   /  AST  44[H]  /  ALT  61  /  AlkPhos  56  02-25                       11.5   16.37 )-----------( 163      ( 25 Feb 2025 13:11 )             42.4   PT/INR - ( 24 Feb 2025 18:19 )   PT: 18.6 sec;   INR: 1.58 ratio    PTT - ( 24 Feb 2025 18:19 )  PTT:30.8 sec  LIVER FUNCTIONS - ( 25 Feb 2025 05:44 )  Alb: 2.4 g/dL / Pro: 7.3 gm/dL / ALK PHOS: 56 U/L / ALT: 61 U/L / AST: 44 U/L / GGT: x           CAPILLARY BLOOD GLUCOSE  POCT Blood Glucose.: 176 mg/dL (25 Feb 2025 17:33)    Urinalysis Basic - ( 25 Feb 2025 13:11 )  Color: x / Appearance: x / SG: x / pH: x  Gluc: 181 mg/dL / Ketone: x  / Bili: x / Urobili: x   Blood: x / Protein: x / Nitrite: x   Leuk Esterase: x / RBC: x / WBC x   Sq Epi: x / Non Sq Epi: x / Bacteria: x    VENT SETTINGS   Mode: AC/ CMV (Assist Control/ Continuous Mandatory Ventilation)  RR (machine): 26  TV (machine): 500  FiO2: 100  PEEP: 17  ITime: 1  PIP: 34    MEDICATIONS  (STANDING):  albuterol/ipratropium for Nebulization 3 milliLiter(s) Nebulizer every 6 hours  azithromycin  IVPB      cefepime  Injectable. 2000 milliGRAM(s) IV Push every 8 hours  chlorhexidine 0.12% Liquid 15 milliLiter(s) Oral Mucosa every 12 hours  chlorhexidine 2% Cloths 1 Application(s) Topical <User Schedule>  cisatracurium Infusion 3 MICROgram(s)/kG/Min (43.1 mL/Hr) IV Continuous <Continuous>  fentaNYL   Infusion. 1 MICROgram(s)/kG/Hr (23.9 mL/Hr) IV Continuous <Continuous>  heparin   Injectable 7500 Unit(s) SubCutaneous every 8 hours  heparin  Infusion.  Unit(s)/Hr (24 mL/Hr) IV Continuous <Continuous>  influenza   Vaccine 0.5 milliLiter(s) IntraMuscular once  linezolid  IVPB      methylPREDNISolone sodium succinate Injectable 40 milliGRAM(s) IV Push every 8 hours  norepinephrine Infusion 0.05 MICROgram(s)/kG/Min (22.4 mL/Hr) IV Continuous <Continuous>  pantoprazole  Injectable 40 milliGRAM(s) IV Push daily  propofol Infusion 20 MICROgram(s)/kG/Min (28.7 mL/Hr) IV Continuous <Continuous>      REVIEW OF SYSTEMS:    Unable to obtain due to mechnical ventilation, sedation, poor mental status     Physicial Exam:     HEENT: PERRLA, EOMI, no drainage or redness  Neck:  No JVD  Respiratory: Breath Sounds equal & clear to percussion & auscultation, no accessory muscle use  Cardiovascular: Regular rate & rhythm, normal S1, S2; no murmurs, gallops or rubs; no S3, S4  Gastrointestinal: Soft, non-tender, non distended no hepatosplenomegaly, normal bowel sounds  Extremities: No peripheral edema, No cyanosis, clubbing   Vascular: Equal and normal pulses: 2+ peripheral pulses throughout  Neurological: no sensory, motor  deficits, normal reflexes  Psychiatric: Normal mood, normal affect  Musculoskeletal: No joint pain, swelling or deformity; no limitation of movement  Skin: No rashes             39y  Male  No Known Allergies    CC: Patient is a 39y old  Male who presents with a chief complaint of Acute hypercapnic and hypoxic respiratory failure     HPI:   39 year old male PMHx of obesity, paroxysmal afib not on AC and HTN who presented to ED with hypoxia at  70% on RA and b/l leg edema on Lasix He had recent flulike with  poor p.o. intake. He was started on HFNC, then  transitioned to BiPAP due to worsening dyspnea. CT found  b/l PNA and possible cellulitis(  abdominal pannus ). He rapidly decompensated form a respiratory perspective and became lethargic requiring Intubation with anesthesia due to difficult airway. He was admitted to ICU for acute hypoxic and hypercapnic respiratory failure. He is on empiric antibiotic( cefepime, azithromycin and linezolid given high risk of MRSA pneumonia )  He progressed rapidly to septic shock and developed ARDS with difficulty oxygenating as well as ventilating which is partly due to body hiatus     His respiratory status worsened over the day today to requiring PEEP increased to 17, tidal volume increased to 550, Plateau pressures were holding at 32 , ABG showed significant hypoxia with PaO2 fo 54 on 100% fiO2 and the case was presented to Dr Rousseau of the ECMO team for transfer to [facility] for ECMO.  His Echo showed enlarged RV reduced RV systolic function and normal EF at 60%. He is currently difficulty to oxygenate with any change in PEEP or tidal volume causing drop in O2 saturation into the low 70's. He's become hypotensive requiring IV levophed to maintain MAP > 65 and is about to be cannulated by the on call ECMO team who is arriving soon. He also has an element of acute pulmonary edema for which he has been diuresed with Diamox and Lasix with an inappropriate urine output and may need more diuresis overnight        PAST MEDICAL & SURGICAL HISTORY:  HTN (hypertension)  Atrial fibrillation  paroxysmal  Papilledema of both eyes  Chronic sinusitis  Morbid obesity    No significant past surgical history    Vital Signs Last 24 Hrs  T(C): 37.8 (25 Feb 2025 21:00), Max: 38.2 (25 Feb 2025 03:00)  T(F): 100 (25 Feb 2025 21:00), Max: 100.8 (25 Feb 2025 03:00)  HR: 88 (25 Feb 2025 21:00) (80 - 116)  BP: 97/54 (25 Feb 2025 18:30) (95/70 - 129/79)  BP(mean): 66 (25 Feb 2025 18:30) (52 - 92)  RR: 26 (25 Feb 2025 21:00) (0 - 28)  SpO2: 85% (25 Feb 2025 21:00) (82% - 97%)    Parameters below as of 25 Feb 2025 21:00  Patient On (Oxygen Delivery Method): ventilator    O2 Concentration (%): 100  ABG - ( 25 Feb 2025 20:57 )  pH, Arterial: 7.36  pH, Blood: x     /  pCO2: 52    /  pO2: 58    / HCO3: 29    / Base Excess: 2.8   /  SaO2: 91          I&O's Summary  24 Feb 2025 07:01  -  25 Feb 2025 07:00  --------------------------------------------------------  IN: 1168 mL / OUT: 1100 mL / NET: 68 mL    25 Feb 2025 07:01  -  25 Feb 2025 21:29  --------------------------------------------------------  IN: 1820.3 mL / OUT: 1550 mL / NET: 270.3 mL      LABS  02-25  133[L]  |  97  |  20  ----------------------------<  181[H]  5.0   |  33[H]  |  1.71[H]  Ca    8.2[L]      25 Feb 2025 13:11  Phos  3.2     02-25  Mg     2.1     02-25  TPro  7.3  /  Alb  2.4[L]  /  TBili  1.0  /  DBili  x   /  AST  44[H]  /  ALT  61  /  AlkPhos  56  02-25                       11.5   16.37 )-----------( 163      ( 25 Feb 2025 13:11 )             42.4   PT/INR - ( 24 Feb 2025 18:19 )   PT: 18.6 sec;   INR: 1.58 ratio    PTT - ( 24 Feb 2025 18:19 )  PTT:30.8 sec  LIVER FUNCTIONS - ( 25 Feb 2025 05:44 )  Alb: 2.4 g/dL / Pro: 7.3 gm/dL / ALK PHOS: 56 U/L / ALT: 61 U/L / AST: 44 U/L / GGT: x           CAPILLARY BLOOD GLUCOSE  POCT Blood Glucose.: 176 mg/dL (25 Feb 2025 17:33)    Urinalysis Basic - ( 25 Feb 2025 13:11 )  Color: x / Appearance: x / SG: x / pH: x  Gluc: 181 mg/dL / Ketone: x  / Bili: x / Urobili: x   Blood: x / Protein: x / Nitrite: x   Leuk Esterase: x / RBC: x / WBC x   Sq Epi: x / Non Sq Epi: x / Bacteria: x    VENT SETTINGS   Mode: AC/ CMV (Assist Control/ Continuous Mandatory Ventilation)  RR (machine): 26  TV (machine): 500  FiO2: 100  PEEP: 17  ITime: 1  PIP: 34    MEDICATIONS  (STANDING):  albuterol/ipratropium for Nebulization 3 milliLiter(s) Nebulizer every 6 hours  azithromycin  IVPB      cefepime  Injectable. 2000 milliGRAM(s) IV Push every 8 hours  chlorhexidine 0.12% Liquid 15 milliLiter(s) Oral Mucosa every 12 hours  chlorhexidine 2% Cloths 1 Application(s) Topical <User Schedule>  cisatracurium Infusion 3 MICROgram(s)/kG/Min (43.1 mL/Hr) IV Continuous <Continuous>  fentaNYL   Infusion. 1 MICROgram(s)/kG/Hr (23.9 mL/Hr) IV Continuous <Continuous>  heparin   Injectable 7500 Unit(s) SubCutaneous every 8 hours  heparin  Infusion.  Unit(s)/Hr (24 mL/Hr) IV Continuous <Continuous>  influenza   Vaccine 0.5 milliLiter(s) IntraMuscular once  linezolid  IVPB      methylPREDNISolone sodium succinate Injectable 40 milliGRAM(s) IV Push every 8 hours  norepinephrine Infusion 0.05 MICROgram(s)/kG/Min (22.4 mL/Hr) IV Continuous <Continuous>  pantoprazole  Injectable 40 milliGRAM(s) IV Push daily  propofol Infusion 20 MICROgram(s)/kG/Min (28.7 mL/Hr) IV Continuous <Continuous>      REVIEW OF SYSTEMS:    Unable to obtain due to mechnical ventilation, sedation, poor mental status     Physicial Exam:     HEENT: PERRLA, EOMI, no drainage or redness  Neck:  No JVD  Respiratory: Breath Sounds equal & clear to percussion & auscultation, no accessory muscle use  Cardiovascular: Regular rate & rhythm, normal S1, S2; no murmurs, gallops or rubs; no S3, S4  Gastrointestinal: Soft, non-tender, non distended no hepatosplenomegaly, normal bowel sounds  Extremities: No peripheral edema, No cyanosis, clubbing   Vascular: Equal and normal pulses: 2+ peripheral pulses throughout  Neurological: no sensory, motor  deficits, normal reflexes  Psychiatric: Normal mood, normal affect  Musculoskeletal: No joint pain, swelling or deformity; no limitation of movement  Skin: No rashes

## 2025-02-25 NOTE — DISCHARGE NOTE PROVIDER - HOSPITAL COURSE
Patient is a 39 year old male with PMHx of HTN, obesity, and Paroxysmal Afib presented to ED on 2/25 with progressive dyspnea, found to be hypercapnic with worsening respiratory status 2/2 multifocal PNA requiring intubation. Patient difficult airway secondary to body habitus. Clinical status has progressively worsened requiring increasing PEEP and recruitment strategy, pt determined to likely be in ARDS. Consulted for transfer for ECMO to [facility]. Transfer accepted.

## 2025-02-25 NOTE — DISCHARGE NOTE NURSING/CASE MANAGEMENT/SOCIAL WORK - NSDCPEFALRISK_GEN_ALL_CORE
For information on Fall & Injury Prevention, visit: [email or web address] OR  [email or web address] OR  https://www.cdc.gov/steadi/patient.html

## 2025-02-25 NOTE — DISCHARGE NOTE NURSING/CASE MANAGEMENT/SOCIAL WORK - FINANCIAL ASSISTANCE
[facility] provides services at a reduced cost to those who are determined to be eligible through [facility]’s financial assistance program. Information regarding [facility]’s financial assistance program can be found by going to [email or web address] or by calling [phone].

## 2025-02-25 NOTE — CONSULT NOTE ADULT - ASSESSMENT
39 year old male with h/o obesity, paroxysmal afib not on AC, HTN was admitted on 2/24 for hypoxia with oxygen saturation reported of 70% on RA and b/l leg edema on lasix. In ED initiated on HFNC, transitioned to BiPAP due to worsening dyspnea. CT noting b/l PNA and possible cellulitis of the abdominal pannus. Patient's respiratory status rapidly decompensated, patient somnolent with increased secretions. Intubated with anesthesia due to difficult airway. Critical care consulted for acute hypoxic and hypercapnic respiratory failure. He received cefepime and azithromycin IV.    #Acute respiratory failure  #Multifocal, multilobar pneumonia  #Probable postviral respiratory syndrome  #Leukocytosis  #ARF  #Morbid obesity  -obtain BC x 2, urine c/s, sputum c/s  -RVP is negative, but reported with viral syndrome about 1-2 weeks ago  -STAU screen detected  -concern for infection with multiresistant organisms is raised. Prior cultures reviewed. An epidemiologic assessment was performed. The risk of the microorganism spread to family members, healthcare staff is low. Standard isolation in place at present time. Will reconsider isolation measures according to infection control policy, further culture results and other new information. The patient will be monitored closely, cultures collected as appropriate.   -start cefepime 2 gm IV q8h , linezolid 600 mg IV q12h and azithromycin 500 mg IV qd  -reason for abx use and side effects reviewed with patient; monitor BMP   -respiratory care  -old chart reviewed to assess prior cultures  -monitor temps  -f/u CBC  -supportive care  2. Other issues:   -care per medicine    d/w ICU team and Dr. Omalley

## 2025-02-25 NOTE — DISCHARGE NOTE PROVIDER - NSDCCPCAREPLAN_GEN_ALL_CORE_FT
PRINCIPAL DISCHARGE DIAGNOSIS  Diagnosis: Sepsis due to pneumonia  Assessment and Plan of Treatment:       SECONDARY DISCHARGE DIAGNOSES  Diagnosis: Acute respiratory failure with hypoxia and hypercapnia  Assessment and Plan of Treatment:     Diagnosis: Cellulitis  Assessment and Plan of Treatment:

## 2025-02-25 NOTE — PROGRESS NOTE ADULT - ASSESSMENT
39 year old male in acute hypoxic respiratory failure secondary to multifocal PNA progressing to septic shock and ARDS     Problems   1) Acute Hypoxic Respiratory Failure  2) multifocal pnuemonia   3) Cellutlits   4) septic shock  5) ARDS  6) ELLA    Plan  -Patient currently on Full vent support  -titrate settings to maintain SaO2 >90%, or pH >7.25  -consider low tidal volume ventilation strategy w/ goal Tv 4-6 cc/kg of ideal body weight  plateau pressure goal <30  -Peridex oral care and VAP prophylaxis with HOB 30 degrees   -aggressive chest PT and suctioning   -daily sedation vacation with spontaneous breathing trial if clinical condition warrants, discuss with respiratory therapy   -vent strategy goal is to follow ardsnet 4-8 mlkg predicted body weight tidal volume consider high peep with lowset Fio2 possible to oxygenate  - wean with breathing trial when peep less than 8 and FiO2 less 50  -if PaO2/FiO2 less than 150 but cannot enact pron ing protocol due to body habitus and fact that the airway was extremely difficult to obtain and fear of ET tube dislodging   - may do  paralysis with nimbex infusion or vecuronium IV pushes and titrate to train of four 2/4  - paralytics use for vent desynchrony and for 48hrs then reassess  - plateau pressure checks Q 4 hrs goal less than 30 cmH20  -consider cuff leak evaluation if intubated greater than 10 days and if so use   -Initial broad spectrum coverage for MDR organisms including staph aureus and gram negatives.   - Follow up sputum culture the narrow spectrum based on culture  sensitivities   -30 cc/kg fluid challenge without improvement in blood pressure  -patient currently on Levophed, will titrate for MAP 65-70  -repeat lactate  -blood/urine/sputum cultures, then initiate broad spectrum antibiotics  -follow cultures and narrow antibitoics as able  -goal UOP >0.5 cc/kg/hr  -procalcitonin   -Hold nephrotoxic meds  -Trend urine output  -Follow up BUN/Creatinine  -follow up electrolytes     Critical Care time: 55 mins assessing presenting problems of acute illness that poses high probability of life threatening deterioration or end organ damage/dysfunction.  Medical decision making including Initiating plan of care, reviewing data, reviewing radiology, direct patient bedside evaluation and interpretation of vital signs, any necessary ventilator management , discussion with multidisciplinary team, discussing goals of care with patient/family, all non inclusive of procedures, COVID 19 specific considerations and therapeutic  options based on the available and rapidly changing literature. Date of entry of this note is equal to the date of services rendered

## 2025-02-25 NOTE — DISCHARGE NOTE PROVIDER - CARE PROVIDER_API CALL
Ruben Espinoza  Family Medicine  [address] [place] [address], [address], [place] [address]  Phone: [phone]  Fax: [phone]  Established Patient  Follow Up Time:

## 2025-02-25 NOTE — PROCEDURE NOTE - NSPOSTCAREGUIDE_GEN_A_CORE
Keep the cast/splint/dressing clean and dry/Care for catheter as per unit/ICU protocols
Keep the cast/splint/dressing clean and dry/Care for catheter as per unit/ICU protocols
No

## 2025-02-25 NOTE — PROCEDURE NOTE - ADDITIONAL PROCEDURE DETAILS
Procedure performed independent of critical care time.   Triple lumen CVC inserted under dynamic ultrasound guidance. Patient tolerated procedure well.       Dx:   ARDS     Date of entry is equal to date of service.
Procedure performed independent of critical care time.   Inserted under dynamic ultrasound guidance     Dx:   ARDS  Date of entry is equal to date of service.

## 2025-02-25 NOTE — DISCHARGE NOTE PROVIDER - NSDCFUSCHEDAPPT_GEN_ALL_CORE_FT
Ruben Espinoza  [facility]  [address]  Scheduled Appointment: 03/31/2025

## 2025-02-25 NOTE — PROGRESS NOTE ADULT - SUBJECTIVE AND OBJECTIVE BOX
Patient is a 39y old  Male who presents with a chief complaint of Acute hypercapnic and hypoxic respiratory failure (25 Feb 2025 15:19)    Allergies    No Known Allergies    Intolerances      REVIEW OF SYSTEMS: SEE BELOW       ICU Vital Signs Last 24 Hrs  T(C): 37.7 (25 Feb 2025 14:30), Max: 38.2 (25 Feb 2025 03:00)  T(F): 99.9 (25 Feb 2025 14:30), Max: 100.8 (25 Feb 2025 03:00)  HR: 107 (25 Feb 2025 14:30) (80 - 135)  BP: 128/76 (25 Feb 2025 14:30) (95/70 - 212/118)  BP(mean): 90 (25 Feb 2025 14:30) (52 - 121)  ABP: --  ABP(mean): --  RR: 26 (25 Feb 2025 14:30) (0 - 30)  SpO2: 91% (25 Feb 2025 14:30) (75% - 100%)    O2 Parameters below as of 25 Feb 2025 14:30  Patient On (Oxygen Delivery Method): ventilator    O2 Concentration (%): 100        CAPILLARY BLOOD GLUCOSE          I&O's Summary    24 Feb 2025 07:01  -  25 Feb 2025 07:00  --------------------------------------------------------  IN: 1168 mL / OUT: 1100 mL / NET: 68 mL    25 Feb 2025 07:01  -  25 Feb 2025 15:35  --------------------------------------------------------  IN: 526 mL / OUT: 645 mL / NET: -119 mL        Mode: AC/ CMV (Assist Control/ Continuous Mandatory Ventilation)  RR (machine): 26  TV (machine): 550  FiO2: 100  PEEP: 14  ITime: 1  PIP: 31      MEDICATIONS  (STANDING):  albuterol/ipratropium for Nebulization 3 milliLiter(s) Nebulizer every 6 hours  azithromycin  IVPB      cefepime  Injectable. 2000 milliGRAM(s) IV Push every 8 hours  chlorhexidine 0.12% Liquid 15 milliLiter(s) Oral Mucosa every 12 hours  chlorhexidine 2% Cloths 1 Application(s) Topical <User Schedule>  cisatracurium Infusion 3 MICROgram(s)/kG/Min (43.1 mL/Hr) IV Continuous <Continuous>  fentaNYL   Infusion. 1 MICROgram(s)/kG/Hr (23.9 mL/Hr) IV Continuous <Continuous>  heparin   Injectable 5000 Unit(s) SubCutaneous every 12 hours  influenza   Vaccine 0.5 milliLiter(s) IntraMuscular once  linezolid  IVPB 600 milliGRAM(s) IV Intermittent once  linezolid  IVPB      methylPREDNISolone sodium succinate Injectable 40 milliGRAM(s) IV Push every 8 hours  pantoprazole  Injectable 40 milliGRAM(s) IV Push daily  propofol Infusion 20 MICROgram(s)/kG/Min (28.7 mL/Hr) IV Continuous <Continuous>      MEDICATIONS  (PRN):      PHYSICAL EXAM: SEE BELOW                          11.5   16.37 )-----------( 163      ( 25 Feb 2025 13:11 )             42.4       02-25    133[L]  |  97  |  20  ----------------------------<  181[H]  5.0   |  33[H]  |  1.71[H]    Ca    8.2[L]      25 Feb 2025 13:11  Phos  3.2     02-25  Mg     2.1     02-25    TPro  7.3  /  Alb  2.4[L]  /  TBili  1.0  /  DBili  x   /  AST  44[H]  /  ALT  61  /  AlkPhos  56  02-25    Lactate 1.4           02-24 @ 22:25    Lactate 1.6           02-24 @ 18:19          PT/INR - ( 24 Feb 2025 18:19 )   PT: 18.6 sec;   INR: 1.58 ratio         PTT - ( 24 Feb 2025 18:19 )  PTT:30.8 sec  Urinalysis Basic - ( 25 Feb 2025 13:11 )    Color: x / Appearance: x / SG: x / pH: x  Gluc: 181 mg/dL / Ketone: x  / Bili: x / Urobili: x   Blood: x / Protein: x / Nitrite: x   Leuk Esterase: x / RBC: x / WBC x   Sq Epi: x / Non Sq Epi: x / Bacteria: x          Rapid RVP Result: NotDetec (02-24 @ 18:19)

## 2025-02-25 NOTE — CONSULT NOTE ADULT - SUBJECTIVE AND OBJECTIVE BOX
Patient is a 39y old  Male who presents with a chief complaint of Hypercapnic RF     HPI:  39 year old male with h/o obesity, paroxysmal afib not on AC, HTN was admitted on 2/24 for hypoxia with oxygen saturation reported of 70% on RA and b/l leg edema on lasix. In ED initiated on HFNC, transitioned to BiPAP due to worsening dyspnea. CT noting b/l PNA and possible cellulitis of the abdominal pannus. Patient's respiratory status rapidly decompensated, patient somnolent with increased secretions. Intubated with anesthesia due to difficult airway. Critical care consulted for acute hypoxic and hypercapnic respiratory failure. He received cefepime and azithromycin IV.  Concern about multiresistant organism infection was raised, including MRS.    Patient evaluated at bedside in ED intubated and sedated. Unable to obtain ROS.       PMH: as above  PSH: as above  Meds: per reconciliation sheet, noted below  MEDICATIONS  (STANDING):  albuterol/ipratropium for Nebulization 3 milliLiter(s) Nebulizer every 6 hours  azithromycin  IVPB      cefepime  Injectable. 2000 milliGRAM(s) IV Push every 8 hours  chlorhexidine 0.12% Liquid 15 milliLiter(s) Oral Mucosa every 12 hours  chlorhexidine 2% Cloths 1 Application(s) Topical <User Schedule>  cisatracurium Infusion 3 MICROgram(s)/kG/Min (43.1 mL/Hr) IV Continuous <Continuous>  fentaNYL   Infusion. 1 MICROgram(s)/kG/Hr (23.9 mL/Hr) IV Continuous <Continuous>  heparin   Injectable 5000 Unit(s) SubCutaneous every 12 hours  influenza   Vaccine 0.5 milliLiter(s) IntraMuscular once  linezolid  IVPB      methylPREDNISolone sodium succinate Injectable 40 milliGRAM(s) IV Push every 8 hours  pantoprazole  Injectable 40 milliGRAM(s) IV Push daily  propofol Infusion 20 MICROgram(s)/kG/Min (28.7 mL/Hr) IV Continuous <Continuous>    MEDICATIONS  (PRN):    Allergies    No Known Allergies    Intolerances      Social: no smoking, no alcohol, no illegal drugs; no recent travel, no exposure to TB  FAMILY HISTORY:    no history of premature cardiovascular disease in first degree relatives    ROS: the patient is intubated,unobtainable  All other systems reviewed and are negative    Vital Signs Last 24 Hrs  T(C): 37.7 (25 Feb 2025 14:30), Max: 38.2 (25 Feb 2025 03:00)  T(F): 99.9 (25 Feb 2025 14:30), Max: 100.8 (25 Feb 2025 03:00)  HR: 107 (25 Feb 2025 14:30) (80 - 135)  BP: 128/76 (25 Feb 2025 14:30) (95/70 - 212/118)  BP(mean): 90 (25 Feb 2025 14:30) (52 - 121)  RR: 26 (25 Feb 2025 14:30) (0 - 30)  SpO2: 91% (25 Feb 2025 14:30) (75% - 100%)    Parameters below as of 25 Feb 2025 14:30  Patient On (Oxygen Delivery Method): ventilator    O2 Concentration (%): 100  Daily     Daily     PE:    Constitutional:  No acute distress  HEENT: NC/AT, EOMI, PERRLA, conjunctivae clear; ears and nose atraumatic; pharynx benign  Neck: supple; thyroid not palpable  Back: no tenderness  Respiratory: respiratory effort normal; crackles at bases  Cardiovascular: S1S2 regular, no murmurs  Abdomen: soft, not tender, not distended, positive BS; no liver or spleen organomegaly  Genitourinary: no suprapubic tenderness  Lymphatic: no LN palpable  Musculoskeletal: no muscle tenderness, no joint swelling or tenderness  Extremities: no pedal edema  Neurological/ Psychiatric: sedated, judgement and insight impaired; moving all extremities  Skin: no rashes; no palpable lesions    Labs: all available labs reviewed                        11.5   16.37 )-----------( 163      ( 25 Feb 2025 13:11 )             42.4     02-25    133[L]  |  97  |  20  ----------------------------<  181[H]  5.0   |  33[H]  |  1.71[H]    Ca    8.2[L]      25 Feb 2025 13:11  Phos  3.2     02-25  Mg     2.1     02-25    TPro  7.3  /  Alb  2.4[L]  /  TBili  1.0  /  DBili  x   /  AST  44[H]  /  ALT  61  /  AlkPhos  56  02-25     LIVER FUNCTIONS - ( 25 Feb 2025 05:44 )  Alb: 2.4 g/dL / Pro: 7.3 gm/dL / ALK PHOS: 56 U/L / ALT: 61 U/L / AST: 44 U/L / GGT: x           Urinalysis with Rflx Culture (02-24 @ 21:49)  Urine Appearance: Clear  Protein, Urine: 300 mg/dL  Urine Microscopic-Add On (NC) (02-24 @ 21:49)  White Blood Cell - Urine: 1 /HPF  Red Blood Cell - Urine: 3 /HPF    (02-24 @ 18:19)  NotDetec    Urinalysis with Rflx Culture (collected 02-24-25 @ 21:49)    Radiology: all available radiological tests reviewed    < from: US Duplex Venous Lower Ext Complete, Bilateral (02.25.25 @ 05:23) >  No evidence of deep venous thrombosis in either lower extremity.  < end of copied text >    < from: Xray Chest 1 View- PORTABLE-Urgent (Xray Chest 1 View- PORTABLE-Urgent .) (02.24.25 @ 23:16) >  Lung parenchyma/Pleura: Increased bilateral opacities and effusions.  < end of copied text >    < from: CT Angio Chest PE Protocol w/ IV Cont (02.24.25 @ 19:05) >  No pulmonary embolism.  Patchy bilateral lower lobe alveolar opacities likely represent multifocal pneumonia.  Hepatomegaly and mild ascites.  Edema/induration of the abdominal pannus, correlate for evidence of cellulitis.  < end of copied text >    Advanced directives addressed: full resuscitation

## 2025-02-26 ENCOUNTER — INPATIENT (INPATIENT)
Facility: HOSPITAL | Age: 40
LOS: 57 days | Discharge: NURSING, REHAB OR LONG-TERM CARE | End: 2025-04-25
Attending: STUDENT IN AN ORGANIZED HEALTH CARE EDUCATION/TRAINING PROGRAM | Admitting: STUDENT IN AN ORGANIZED HEALTH CARE EDUCATION/TRAINING PROGRAM
Payer: COMMERCIAL

## 2025-02-26 ENCOUNTER — NON-APPOINTMENT (OUTPATIENT)
Age: 40
End: 2025-02-26

## 2025-02-26 ENCOUNTER — RESULT REVIEW (OUTPATIENT)
Age: 40
End: 2025-02-26

## 2025-02-26 VITALS — WEIGHT: 315 LBS | RESPIRATION RATE: 13 BRPM | HEART RATE: 78 BPM | HEIGHT: 67 IN | OXYGEN SATURATION: 94 %

## 2025-02-26 DIAGNOSIS — J80 ACUTE RESPIRATORY DISTRESS SYNDROME: ICD-10-CM

## 2025-02-26 DIAGNOSIS — Z51.5 ENCOUNTER FOR PALLIATIVE CARE: ICD-10-CM

## 2025-02-26 DIAGNOSIS — Z92.81 PERSONAL HISTORY OF EXTRACORPOREAL MEMBRANE OXYGENATION (ECMO): ICD-10-CM

## 2025-02-26 DIAGNOSIS — Z71.89 OTHER SPECIFIED COUNSELING: ICD-10-CM

## 2025-02-26 LAB
ACTH SER-ACNC: <1.5 PG/ML — LOW (ref 7.2–63.3)
ADD ON TEST-SPECIMEN IN LAB: SIGNIFICANT CHANGE UP
ALBUMIN SERPL ELPH-MCNC: 2.4 G/DL — LOW (ref 3.3–5)
ALBUMIN SERPL ELPH-MCNC: 2.5 G/DL — LOW (ref 3.3–5)
ALBUMIN SERPL ELPH-MCNC: 2.6 G/DL — LOW (ref 3.3–5)
ALBUMIN SERPL ELPH-MCNC: 2.7 G/DL — LOW (ref 3.3–5)
ALP SERPL-CCNC: 51 U/L — SIGNIFICANT CHANGE UP (ref 40–120)
ALP SERPL-CCNC: 52 U/L — SIGNIFICANT CHANGE UP (ref 40–120)
ALP SERPL-CCNC: 52 U/L — SIGNIFICANT CHANGE UP (ref 40–120)
ALP SERPL-CCNC: 59 U/L — SIGNIFICANT CHANGE UP (ref 40–120)
ALT FLD-CCNC: 58 U/L — HIGH (ref 4–41)
ALT FLD-CCNC: 60 U/L — HIGH (ref 4–41)
ALT FLD-CCNC: 61 U/L — HIGH (ref 4–41)
ALT FLD-CCNC: 76 U/L — HIGH (ref 4–41)
ANION GAP SERPL CALC-SCNC: 10 MMOL/L — SIGNIFICANT CHANGE UP (ref 7–14)
ANION GAP SERPL CALC-SCNC: 10 MMOL/L — SIGNIFICANT CHANGE UP (ref 7–14)
ANION GAP SERPL CALC-SCNC: 14 MMOL/L — SIGNIFICANT CHANGE UP (ref 7–14)
ANION GAP SERPL CALC-SCNC: 9 MMOL/L — SIGNIFICANT CHANGE UP (ref 7–14)
APPEARANCE UR: CLEAR — SIGNIFICANT CHANGE UP
APTT BLD: 25.5 SEC — SIGNIFICANT CHANGE UP (ref 24.5–35.6)
APTT BLD: 42.7 SEC — HIGH (ref 24.5–35.6)
APTT BLD: 52 SEC — HIGH (ref 24.5–35.6)
APTT BLD: 52.3 SEC — HIGH (ref 24.5–35.6)
APTT BLD: 53.7 SEC — HIGH (ref 24.5–35.6)
AST SERPL-CCNC: 72 U/L — HIGH (ref 4–40)
AST SERPL-CCNC: 73 U/L — HIGH (ref 4–40)
AST SERPL-CCNC: 76 U/L — HIGH (ref 4–40)
AST SERPL-CCNC: 78 U/L — HIGH (ref 4–40)
B PERT DNA SPEC QL NAA+PROBE: SIGNIFICANT CHANGE UP
B PERT IGG+IGM PNL SER: ABNORMAL
B PERT+PARAPERT DNA PNL SPEC NAA+PROBE: SIGNIFICANT CHANGE UP
BACTERIA # UR AUTO: NEGATIVE /HPF — SIGNIFICANT CHANGE UP
BASOPHILS # BLD AUTO: 0.01 K/UL — SIGNIFICANT CHANGE UP (ref 0–0.2)
BASOPHILS # BLD AUTO: 0.02 K/UL — SIGNIFICANT CHANGE UP (ref 0–0.2)
BASOPHILS # BLD AUTO: 0.03 K/UL — SIGNIFICANT CHANGE UP (ref 0–0.2)
BASOPHILS NFR BLD AUTO: 0.1 % — SIGNIFICANT CHANGE UP (ref 0–2)
BILIRUB SERPL-MCNC: 1 MG/DL — SIGNIFICANT CHANGE UP (ref 0.2–1.2)
BILIRUB UR-MCNC: NEGATIVE — SIGNIFICANT CHANGE UP
BLOOD GAS ARTERIAL COMPREHENSIVE RESULT: SIGNIFICANT CHANGE UP
BLOOD GAS ECMO POST MEMBRANE - ARTERIAL RESULT: SIGNIFICANT CHANGE UP
BLOOD GAS ECMO PRE MEMBRANE - VENOUS RESULT: SIGNIFICANT CHANGE UP
BUN SERPL-MCNC: 23 MG/DL — SIGNIFICANT CHANGE UP (ref 7–23)
BUN SERPL-MCNC: 24 MG/DL — HIGH (ref 7–23)
C PNEUM DNA SPEC QL NAA+PROBE: SIGNIFICANT CHANGE UP
CALCIUM SERPL-MCNC: 7.9 MG/DL — LOW (ref 8.4–10.5)
CALCIUM SERPL-MCNC: 8 MG/DL — LOW (ref 8.4–10.5)
CALCIUM SERPL-MCNC: 8 MG/DL — LOW (ref 8.4–10.5)
CALCIUM SERPL-MCNC: 8.1 MG/DL — LOW (ref 8.4–10.5)
CAST: 0 /LPF — SIGNIFICANT CHANGE UP (ref 0–4)
CHLORIDE SERPL-SCNC: 95 MMOL/L — LOW (ref 98–107)
CHLORIDE SERPL-SCNC: 99 MMOL/L — SIGNIFICANT CHANGE UP (ref 98–107)
CO2 SERPL-SCNC: 27 MMOL/L — SIGNIFICANT CHANGE UP (ref 22–31)
CO2 SERPL-SCNC: 28 MMOL/L — SIGNIFICANT CHANGE UP (ref 22–31)
CO2 SERPL-SCNC: 29 MMOL/L — SIGNIFICANT CHANGE UP (ref 22–31)
CO2 SERPL-SCNC: 30 MMOL/L — SIGNIFICANT CHANGE UP (ref 22–31)
COLOR FLD: ABNORMAL
COLOR SPEC: YELLOW — SIGNIFICANT CHANGE UP
CREAT SERPL-MCNC: 1.48 MG/DL — HIGH (ref 0.5–1.3)
CREAT SERPL-MCNC: 1.5 MG/DL — HIGH (ref 0.5–1.3)
CREAT SERPL-MCNC: 1.51 MG/DL — HIGH (ref 0.5–1.3)
CREAT SERPL-MCNC: 1.53 MG/DL — HIGH (ref 0.5–1.3)
CULTURE RESULTS: ABNORMAL
CULTURE RESULTS: SIGNIFICANT CHANGE UP
DIFF PNL FLD: ABNORMAL
EGFR: 60 ML/MIN/1.73M2 — SIGNIFICANT CHANGE UP
EGFR: 60 ML/MIN/1.73M2 — SIGNIFICANT CHANGE UP
EGFR: 61 ML/MIN/1.73M2 — SIGNIFICANT CHANGE UP
EGFR: 62 ML/MIN/1.73M2 — SIGNIFICANT CHANGE UP
EGFR: 62 ML/MIN/1.73M2 — SIGNIFICANT CHANGE UP
EOSINOPHIL # BLD AUTO: 0 K/UL — SIGNIFICANT CHANGE UP (ref 0–0.5)
EOSINOPHIL # FLD: 0 % — SIGNIFICANT CHANGE UP
EOSINOPHIL NFR BLD AUTO: 0 % — SIGNIFICANT CHANGE UP (ref 0–6)
FIBRINOGEN PPP-MCNC: 268 MG/DL — SIGNIFICANT CHANGE UP (ref 200–465)
FIBRINOGEN PPP-MCNC: 285 MG/DL — SIGNIFICANT CHANGE UP (ref 200–465)
FLUAV AG NPH QL: SIGNIFICANT CHANGE UP
FLUAV SUBTYP SPEC NAA+PROBE: SIGNIFICANT CHANGE UP
FLUBV AG NPH QL: SIGNIFICANT CHANGE UP
FLUBV RNA SPEC QL NAA+PROBE: SIGNIFICANT CHANGE UP
FLUID INTAKE SUBSTANCE CLASS: SIGNIFICANT CHANGE UP
FOLATE+VIT B12 SERBLD-IMP: 0 % — SIGNIFICANT CHANGE UP
GLUCOSE BLDC GLUCOMTR-MCNC: 148 MG/DL — HIGH (ref 70–99)
GLUCOSE BLDC GLUCOMTR-MCNC: 159 MG/DL — HIGH (ref 70–99)
GLUCOSE BLDC GLUCOMTR-MCNC: 160 MG/DL — HIGH (ref 70–99)
GLUCOSE SERPL-MCNC: 149 MG/DL — HIGH (ref 70–99)
GLUCOSE SERPL-MCNC: 153 MG/DL — HIGH (ref 70–99)
GLUCOSE SERPL-MCNC: 154 MG/DL — HIGH (ref 70–99)
GLUCOSE SERPL-MCNC: 155 MG/DL — HIGH (ref 70–99)
GLUCOSE UR QL: NEGATIVE MG/DL — SIGNIFICANT CHANGE UP
GRAM STN FLD: SIGNIFICANT CHANGE UP
HADV DNA SPEC QL NAA+PROBE: SIGNIFICANT CHANGE UP
HAPTOGLOB SERPL-MCNC: 189 MG/DL — SIGNIFICANT CHANGE UP (ref 34–200)
HCOV 229E RNA SPEC QL NAA+PROBE: SIGNIFICANT CHANGE UP
HCOV HKU1 RNA SPEC QL NAA+PROBE: SIGNIFICANT CHANGE UP
HCOV NL63 RNA SPEC QL NAA+PROBE: SIGNIFICANT CHANGE UP
HCOV OC43 RNA SPEC QL NAA+PROBE: SIGNIFICANT CHANGE UP
HCT VFR BLD CALC: 36.9 % — LOW (ref 39–50)
HCT VFR BLD CALC: 37.8 % — LOW (ref 39–50)
HCT VFR BLD CALC: 39.4 % — SIGNIFICANT CHANGE UP (ref 39–50)
HGB BLD-MCNC: 10.9 G/DL — LOW (ref 13–17)
HGB BLD-MCNC: 11.2 G/DL — LOW (ref 13–17)
HGB BLD-MCNC: 11.2 G/DL — LOW (ref 13–17)
HIV 1+2 AB+HIV1 P24 AG SERPL QL IA: SIGNIFICANT CHANGE UP
HMPV RNA SPEC QL NAA+PROBE: SIGNIFICANT CHANGE UP
HPIV1 RNA SPEC QL NAA+PROBE: SIGNIFICANT CHANGE UP
HPIV2 RNA SPEC QL NAA+PROBE: SIGNIFICANT CHANGE UP
HPIV3 RNA SPEC QL NAA+PROBE: SIGNIFICANT CHANGE UP
HPIV4 RNA SPEC QL NAA+PROBE: SIGNIFICANT CHANGE UP
IANC: 15.72 K/UL — HIGH (ref 1.8–7.4)
IANC: 18.71 K/UL — HIGH (ref 1.8–7.4)
IANC: 19.57 K/UL — HIGH (ref 1.8–7.4)
IMM GRANULOCYTES NFR BLD AUTO: 0.6 % — SIGNIFICANT CHANGE UP (ref 0–0.9)
IMM GRANULOCYTES NFR BLD AUTO: 0.7 % — SIGNIFICANT CHANGE UP (ref 0–0.9)
IMM GRANULOCYTES NFR BLD AUTO: 0.7 % — SIGNIFICANT CHANGE UP (ref 0–0.9)
INR BLD: 1.46 RATIO — HIGH (ref 0.85–1.16)
INR BLD: 1.98 RATIO — HIGH (ref 0.85–1.16)
INR BLD: 2.27 RATIO — HIGH (ref 0.85–1.16)
KETONES UR-MCNC: NEGATIVE MG/DL — SIGNIFICANT CHANGE UP
LDH SERPL L TO P-CCNC: 355 U/L — HIGH (ref 135–225)
LEUKOCYTE ESTERASE UR-ACNC: ABNORMAL
LYMPHOCYTES # BLD AUTO: 0.47 K/UL — LOW (ref 1–3.3)
LYMPHOCYTES # BLD AUTO: 0.5 K/UL — LOW (ref 1–3.3)
LYMPHOCYTES # BLD AUTO: 0.73 K/UL — LOW (ref 1–3.3)
LYMPHOCYTES # BLD AUTO: 2.2 % — LOW (ref 13–44)
LYMPHOCYTES # BLD AUTO: 2.4 % — LOW (ref 13–44)
LYMPHOCYTES # BLD AUTO: 4.1 % — LOW (ref 13–44)
LYMPHOCYTES # FLD: 1 % — SIGNIFICANT CHANGE UP
M PNEUMO DNA SPEC QL NAA+PROBE: SIGNIFICANT CHANGE UP
MAGNESIUM SERPL-MCNC: 2.1 MG/DL — SIGNIFICANT CHANGE UP (ref 1.6–2.6)
MAGNESIUM SERPL-MCNC: 2.2 MG/DL — SIGNIFICANT CHANGE UP (ref 1.6–2.6)
MCHC RBC-ENTMCNC: 22.5 PG — LOW (ref 27–34)
MCHC RBC-ENTMCNC: 22.7 PG — LOW (ref 27–34)
MCHC RBC-ENTMCNC: 22.9 PG — LOW (ref 27–34)
MCHC RBC-ENTMCNC: 28.4 G/DL — LOW (ref 32–36)
MCHC RBC-ENTMCNC: 29.5 G/DL — LOW (ref 32–36)
MCHC RBC-ENTMCNC: 29.6 G/DL — LOW (ref 32–36)
MCV RBC AUTO: 76.7 FL — LOW (ref 80–100)
MCV RBC AUTO: 77.5 FL — LOW (ref 80–100)
MCV RBC AUTO: 79.3 FL — LOW (ref 80–100)
MESOTHL CELL # FLD: 0 % — SIGNIFICANT CHANGE UP
MONOCYTES # BLD AUTO: 1.04 K/UL — HIGH (ref 0–0.9)
MONOCYTES # BLD AUTO: 1.11 K/UL — HIGH (ref 0–0.9)
MONOCYTES # BLD AUTO: 1.22 K/UL — HIGH (ref 0–0.9)
MONOCYTES NFR BLD AUTO: 5.4 % — SIGNIFICANT CHANGE UP (ref 2–14)
MONOCYTES NFR BLD AUTO: 5.7 % — SIGNIFICANT CHANGE UP (ref 2–14)
MONOCYTES NFR BLD AUTO: 5.9 % — SIGNIFICANT CHANGE UP (ref 2–14)
MONOS+MACROS # FLD: 1 % — SIGNIFICANT CHANGE UP
MRSA PCR RESULT.: SIGNIFICANT CHANGE UP
NEUTROPHILS # BLD AUTO: 15.72 K/UL — HIGH (ref 1.8–7.4)
NEUTROPHILS # BLD AUTO: 18.71 K/UL — HIGH (ref 1.8–7.4)
NEUTROPHILS # BLD AUTO: 19.57 K/UL — HIGH (ref 1.8–7.4)
NEUTROPHILS NFR BLD AUTO: 89.3 % — HIGH (ref 43–77)
NEUTROPHILS NFR BLD AUTO: 91.3 % — HIGH (ref 43–77)
NEUTROPHILS NFR BLD AUTO: 91.4 % — HIGH (ref 43–77)
NEUTROPHILS-BODY FLUID: 98 % — SIGNIFICANT CHANGE UP
NIGHT BLUE STAIN TISS: SIGNIFICANT CHANGE UP
NITRITE UR-MCNC: NEGATIVE — SIGNIFICANT CHANGE UP
NRBC # BLD AUTO: 0.21 K/UL — HIGH (ref 0–0)
NRBC # BLD AUTO: 0.24 K/UL — HIGH (ref 0–0)
NRBC # BLD AUTO: 0.3 K/UL — HIGH (ref 0–0)
NRBC # FLD: 0.21 K/UL — HIGH (ref 0–0)
NRBC # FLD: 0.24 K/UL — HIGH (ref 0–0)
NRBC # FLD: 0.3 K/UL — HIGH (ref 0–0)
NRBC BLD AUTO-RTO: 1 /100 WBCS — HIGH (ref 0–0)
OTHER CELLS FLD MANUAL: 0 % — SIGNIFICANT CHANGE UP
PH UR: 7.5 — SIGNIFICANT CHANGE UP (ref 5–8)
PHOSPHATE SERPL-MCNC: 3 MG/DL — SIGNIFICANT CHANGE UP (ref 2.5–4.5)
PHOSPHATE SERPL-MCNC: 3.5 MG/DL — SIGNIFICANT CHANGE UP (ref 2.5–4.5)
PHOSPHATE SERPL-MCNC: 3.5 MG/DL — SIGNIFICANT CHANGE UP (ref 2.5–4.5)
PHOSPHATE SERPL-MCNC: 4.1 MG/DL — SIGNIFICANT CHANGE UP (ref 2.5–4.5)
PLATELET # BLD AUTO: 121 K/UL — LOW (ref 150–400)
PLATELET # BLD AUTO: 142 K/UL — LOW (ref 150–400)
PLATELET # BLD AUTO: 144 K/UL — LOW (ref 150–400)
POTASSIUM SERPL-MCNC: 4 MMOL/L — SIGNIFICANT CHANGE UP (ref 3.5–5.3)
POTASSIUM SERPL-MCNC: 4.1 MMOL/L — SIGNIFICANT CHANGE UP (ref 3.5–5.3)
POTASSIUM SERPL-MCNC: 4.2 MMOL/L — SIGNIFICANT CHANGE UP (ref 3.5–5.3)
POTASSIUM SERPL-MCNC: 4.3 MMOL/L — SIGNIFICANT CHANGE UP (ref 3.5–5.3)
POTASSIUM SERPL-SCNC: 4 MMOL/L — SIGNIFICANT CHANGE UP (ref 3.5–5.3)
POTASSIUM SERPL-SCNC: 4.1 MMOL/L — SIGNIFICANT CHANGE UP (ref 3.5–5.3)
POTASSIUM SERPL-SCNC: 4.2 MMOL/L — SIGNIFICANT CHANGE UP (ref 3.5–5.3)
POTASSIUM SERPL-SCNC: 4.3 MMOL/L — SIGNIFICANT CHANGE UP (ref 3.5–5.3)
PROCALCITONIN SERPL-MCNC: 0.31 NG/ML — HIGH (ref 0.02–0.1)
PROCALCITONIN SERPL-MCNC: 0.32 NG/ML — HIGH (ref 0.02–0.1)
PROLACTIN SERPL-MCNC: 30.9 NG/ML — HIGH (ref 4.1–18.4)
PROT SERPL-MCNC: 6.4 G/DL — SIGNIFICANT CHANGE UP (ref 6–8.3)
PROT SERPL-MCNC: 6.7 G/DL — SIGNIFICANT CHANGE UP (ref 6–8.3)
PROT UR-MCNC: NEGATIVE MG/DL — SIGNIFICANT CHANGE UP
PROTHROM AB SERPL-ACNC: 17.3 SEC — HIGH (ref 9.9–13.4)
PROTHROM AB SERPL-ACNC: 23.4 SEC — HIGH (ref 9.9–13.4)
PROTHROM AB SERPL-ACNC: 26.8 SEC — HIGH (ref 9.9–13.4)
RAPID RVP RESULT: SIGNIFICANT CHANGE UP
RBC # BLD: 4.76 M/UL — SIGNIFICANT CHANGE UP (ref 4.2–5.8)
RBC # BLD: 4.93 M/UL — SIGNIFICANT CHANGE UP (ref 4.2–5.8)
RBC # BLD: 4.97 M/UL — SIGNIFICANT CHANGE UP (ref 4.2–5.8)
RBC # FLD: 18.9 % — HIGH (ref 10.3–14.5)
RBC # FLD: 19.1 % — HIGH (ref 10.3–14.5)
RBC # FLD: 19.3 % — HIGH (ref 10.3–14.5)
RBC CASTS # UR COMP ASSIST: 2 /HPF — SIGNIFICANT CHANGE UP (ref 0–4)
RCV VOL RI: SIGNIFICANT CHANGE UP CELLS/UL (ref 0–5)
RSV RNA NPH QL NAA+NON-PROBE: SIGNIFICANT CHANGE UP
RSV RNA SPEC QL NAA+PROBE: SIGNIFICANT CHANGE UP
RV+EV RNA SPEC QL NAA+PROBE: SIGNIFICANT CHANGE UP
S AUREUS DNA NOSE QL NAA+PROBE: DETECTED
S PNEUM AG UR QL: NEGATIVE — SIGNIFICANT CHANGE UP
SARS-COV-2 RNA SPEC QL NAA+PROBE: SIGNIFICANT CHANGE UP
SARS-COV-2 RNA SPEC QL NAA+PROBE: SIGNIFICANT CHANGE UP
SODIUM SERPL-SCNC: 136 MMOL/L — SIGNIFICANT CHANGE UP (ref 135–145)
SODIUM SERPL-SCNC: 137 MMOL/L — SIGNIFICANT CHANGE UP (ref 135–145)
SODIUM SERPL-SCNC: 138 MMOL/L — SIGNIFICANT CHANGE UP (ref 135–145)
SODIUM SERPL-SCNC: 138 MMOL/L — SIGNIFICANT CHANGE UP (ref 135–145)
SP GR SPEC: 1.01 — SIGNIFICANT CHANGE UP (ref 1–1.03)
SPECIMEN SOURCE: SIGNIFICANT CHANGE UP
SQUAMOUS # UR AUTO: 1 /HPF — SIGNIFICANT CHANGE UP (ref 0–5)
T3 SERPL-MCNC: 67 NG/DL — LOW (ref 80–200)
T4 AB SER-ACNC: 3.28 UG/DL — LOW (ref 5.1–13)
T4 FREE SERPL-MCNC: 0.9 NG/DL — SIGNIFICANT CHANGE UP (ref 0.9–1.7)
TOTAL CELLS COUNTED, BODY FLUID: 100 CELLS — SIGNIFICANT CHANGE UP
TOTAL NUCLEATED CELL COUNT, BODY FLUID: SIGNIFICANT CHANGE UP CELLS/UL (ref 0–5)
TRIGL SERPL-MCNC: 207 MG/DL — HIGH
TROPONIN T, HIGH SENSITIVITY RESULT: 30 NG/L — SIGNIFICANT CHANGE UP
TUBE TYPE: SIGNIFICANT CHANGE UP
UROBILINOGEN FLD QL: 0.2 MG/DL — SIGNIFICANT CHANGE UP (ref 0.2–1)
WBC # BLD: 17.61 K/UL — HIGH (ref 3.8–10.5)
WBC # BLD: 20.49 K/UL — HIGH (ref 3.8–10.5)
WBC # BLD: 21.44 K/UL — HIGH (ref 3.8–10.5)
WBC # FLD AUTO: 17.61 K/UL — HIGH (ref 3.8–10.5)
WBC # FLD AUTO: 20.49 K/UL — HIGH (ref 3.8–10.5)
WBC # FLD AUTO: 21.44 K/UL — HIGH (ref 3.8–10.5)
WBC UR QL: 8 /HPF — HIGH (ref 0–5)

## 2025-02-26 PROCEDURE — 31624 DX BRONCHOSCOPE/LAVAGE: CPT | Mod: GC

## 2025-02-26 PROCEDURE — 99291 CRITICAL CARE FIRST HOUR: CPT

## 2025-02-26 PROCEDURE — 99223 1ST HOSP IP/OBS HIGH 75: CPT | Mod: GC

## 2025-02-26 PROCEDURE — 99292 CRITICAL CARE ADDL 30 MIN: CPT | Mod: GC,25

## 2025-02-26 PROCEDURE — 93308 TTE F-UP OR LMTD: CPT | Mod: 26,GC

## 2025-02-26 PROCEDURE — 93306 TTE W/DOPPLER COMPLETE: CPT | Mod: 26

## 2025-02-26 PROCEDURE — 76705 ECHO EXAM OF ABDOMEN: CPT | Mod: 26

## 2025-02-26 PROCEDURE — 93010 ELECTROCARDIOGRAM REPORT: CPT

## 2025-02-26 PROCEDURE — 93314 ECHO TRANSESOPHAGEAL: CPT | Mod: 26,GC

## 2025-02-26 PROCEDURE — 33946 ECMO/ECLS INITIATION VENOUS: CPT | Mod: GC

## 2025-02-26 PROCEDURE — 99291 CRITICAL CARE FIRST HOUR: CPT | Mod: GC,25

## 2025-02-26 PROCEDURE — 31645 BRNCHSC W/THER ASPIR 1ST: CPT | Mod: GC

## 2025-02-26 PROCEDURE — 76604 US EXAM CHEST: CPT | Mod: 26,GC

## 2025-02-26 PROCEDURE — 74018 RADEX ABDOMEN 1 VIEW: CPT | Mod: 26

## 2025-02-26 PROCEDURE — 71045 X-RAY EXAM CHEST 1 VIEW: CPT | Mod: 26,76

## 2025-02-26 RX ORDER — ESMOLOL HCL 100MG/10ML
50 VIAL (ML) INTRAVENOUS
Qty: 2500 | Refills: 0 | Status: DISCONTINUED | OUTPATIENT
Start: 2025-02-26 | End: 2025-02-26

## 2025-02-26 RX ORDER — ARGATROBAN 100 MG/ML
1.1 INJECTION, SOLUTION INTRAVENOUS
Qty: 250 | Refills: 0 | Status: DISCONTINUED | OUTPATIENT
Start: 2025-02-26 | End: 2025-03-01

## 2025-02-26 RX ORDER — PIPERACILLIN-TAZO-DEXTROSE,ISO 2.25G/50ML
4.5 IV SOLUTION, PIGGYBACK PREMIX FROZEN(ML) INTRAVENOUS EVERY 12 HOURS
Refills: 0 | Status: DISCONTINUED | OUTPATIENT
Start: 2025-02-26 | End: 2025-02-26

## 2025-02-26 RX ORDER — PROPOFOL 10 MG/ML
50 INJECTION, EMULSION INTRAVENOUS
Qty: 1000 | Refills: 0 | Status: DISCONTINUED | OUTPATIENT
Start: 2025-02-26 | End: 2025-02-27

## 2025-02-26 RX ORDER — PIPERACILLIN-TAZO-DEXTROSE,ISO 2.25G/50ML
3.38 IV SOLUTION, PIGGYBACK PREMIX FROZEN(ML) INTRAVENOUS EVERY 8 HOURS
Refills: 0 | Status: DISCONTINUED | OUTPATIENT
Start: 2025-02-26 | End: 2025-02-26

## 2025-02-26 RX ORDER — DEXAMETHASONE 0.5 MG/1
10 TABLET ORAL DAILY
Refills: 0 | Status: DISCONTINUED | OUTPATIENT
Start: 2025-03-04 | End: 2025-03-06

## 2025-02-26 RX ORDER — DEXTROSE 50 % IN WATER 50 %
25 SYRINGE (ML) INTRAVENOUS ONCE
Refills: 0 | Status: DISCONTINUED | OUTPATIENT
Start: 2025-02-26 | End: 2025-04-25

## 2025-02-26 RX ORDER — MUPIROCIN CALCIUM 20 MG/G
1 CREAM TOPICAL
Refills: 0 | Status: COMPLETED | OUTPATIENT
Start: 2025-02-26 | End: 2025-03-03

## 2025-02-26 RX ORDER — LINEZOLID 2 MG/ML
600 INJECTION, SOLUTION INTRAVENOUS EVERY 12 HOURS
Refills: 0 | Status: DISCONTINUED | OUTPATIENT
Start: 2025-02-26 | End: 2025-02-26

## 2025-02-26 RX ORDER — CISATRACURIUM BESYLATE 10 MG/5ML
2 INJECTION, SOLUTION INTRAVENOUS
Qty: 200 | Refills: 0 | Status: DISCONTINUED | OUTPATIENT
Start: 2025-02-26 | End: 2025-02-27

## 2025-02-26 RX ORDER — VANCOMYCIN HCL IN 5 % DEXTROSE 1.5G/250ML
PLASTIC BAG, INJECTION (ML) INTRAVENOUS
Refills: 0 | Status: DISCONTINUED | OUTPATIENT
Start: 2025-02-26 | End: 2025-02-26

## 2025-02-26 RX ORDER — POLYETHYLENE GLYCOL 3350 17 G/17G
17 POWDER, FOR SOLUTION ORAL
Refills: 0 | Status: DISCONTINUED | OUTPATIENT
Start: 2025-02-26 | End: 2025-03-06

## 2025-02-26 RX ORDER — SODIUM CHLORIDE 9 G/1000ML
1000 INJECTION, SOLUTION INTRAVENOUS
Refills: 0 | Status: DISCONTINUED | OUTPATIENT
Start: 2025-02-26 | End: 2025-02-26

## 2025-02-26 RX ORDER — INSULIN LISPRO 100 U/ML
INJECTION, SOLUTION INTRAVENOUS; SUBCUTANEOUS EVERY 6 HOURS
Refills: 0 | Status: DISCONTINUED | OUTPATIENT
Start: 2025-02-26 | End: 2025-03-01

## 2025-02-26 RX ORDER — IPRATROPIUM BROMIDE AND ALBUTEROL SULFATE .5; 2.5 MG/3ML; MG/3ML
3 SOLUTION RESPIRATORY (INHALATION) EVERY 6 HOURS
Refills: 0 | Status: DISCONTINUED | OUTPATIENT
Start: 2025-02-26 | End: 2025-04-03

## 2025-02-26 RX ORDER — SENNA 187 MG
2 TABLET ORAL AT BEDTIME
Refills: 0 | Status: DISCONTINUED | OUTPATIENT
Start: 2025-02-26 | End: 2025-02-27

## 2025-02-26 RX ORDER — FENTANYL CITRATE-0.9 % NACL/PF 100MCG/2ML
100 SYRINGE (ML) INTRAVENOUS ONCE
Refills: 0 | Status: DISCONTINUED | OUTPATIENT
Start: 2025-02-26 | End: 2025-02-26

## 2025-02-26 RX ORDER — DEXTROSE 50 % IN WATER 50 %
12.5 SYRINGE (ML) INTRAVENOUS ONCE
Refills: 0 | Status: DISCONTINUED | OUTPATIENT
Start: 2025-02-26 | End: 2025-04-25

## 2025-02-26 RX ORDER — ARGATROBAN 100 MG/ML
0.5 INJECTION, SOLUTION INTRAVENOUS
Qty: 50 | Refills: 0 | Status: DISCONTINUED | OUTPATIENT
Start: 2025-02-26 | End: 2025-02-26

## 2025-02-26 RX ORDER — ACETAZOLAMIDE 250 MG/1
500 TABLET ORAL ONCE
Refills: 0 | Status: COMPLETED | OUTPATIENT
Start: 2025-02-26 | End: 2025-02-26

## 2025-02-26 RX ORDER — DEXTROSE 50 % IN WATER 50 %
15 SYRINGE (ML) INTRAVENOUS ONCE
Refills: 0 | Status: DISCONTINUED | OUTPATIENT
Start: 2025-02-26 | End: 2025-02-26

## 2025-02-26 RX ORDER — FUROSEMIDE 10 MG/ML
60 INJECTION INTRAMUSCULAR; INTRAVENOUS ONCE
Refills: 0 | Status: COMPLETED | OUTPATIENT
Start: 2025-02-26 | End: 2025-02-26

## 2025-02-26 RX ORDER — VANCOMYCIN HCL IN 5 % DEXTROSE 1.5G/250ML
PLASTIC BAG, INJECTION (ML) INTRAVENOUS
Refills: 0 | Status: DISCONTINUED | OUTPATIENT
Start: 2025-02-26 | End: 2025-03-01

## 2025-02-26 RX ORDER — ACETAMINOPHEN 500 MG/5ML
1000 LIQUID (ML) ORAL ONCE
Refills: 0 | Status: COMPLETED | OUTPATIENT
Start: 2025-02-26 | End: 2025-02-26

## 2025-02-26 RX ORDER — GLUCAGON 3 MG/1
1 POWDER NASAL ONCE
Refills: 0 | Status: DISCONTINUED | OUTPATIENT
Start: 2025-02-26 | End: 2025-04-25

## 2025-02-26 RX ORDER — DEXAMETHASONE 0.5 MG/1
20 TABLET ORAL DAILY
Refills: 0 | Status: COMPLETED | OUTPATIENT
Start: 2025-02-26 | End: 2025-03-02

## 2025-02-26 RX ORDER — VANCOMYCIN HCL IN 5 % DEXTROSE 1.5G/250ML
1250 PLASTIC BAG, INJECTION (ML) INTRAVENOUS ONCE
Refills: 0 | Status: COMPLETED | OUTPATIENT
Start: 2025-02-26 | End: 2025-02-26

## 2025-02-26 RX ORDER — NOREPINEPHRINE BITARTRATE 8 MG
0.05 SOLUTION INTRAVENOUS
Qty: 16 | Refills: 0 | Status: DISCONTINUED | OUTPATIENT
Start: 2025-02-26 | End: 2025-02-27

## 2025-02-26 RX ORDER — ESMOLOL HCL 100MG/10ML
49.63 VIAL (ML) INTRAVENOUS
Qty: 2500 | Refills: 0 | Status: DISCONTINUED | OUTPATIENT
Start: 2025-02-26 | End: 2025-02-27

## 2025-02-26 RX ORDER — PIPERACILLIN-TAZO-DEXTROSE,ISO 2.25G/50ML
3.38 IV SOLUTION, PIGGYBACK PREMIX FROZEN(ML) INTRAVENOUS EVERY 8 HOURS
Refills: 0 | Status: DISCONTINUED | OUTPATIENT
Start: 2025-02-26 | End: 2025-02-27

## 2025-02-26 RX ORDER — HYDROMORPHONE/SOD CHLOR,ISO/PF 2 MG/10 ML
1 SYRINGE (ML) INJECTION
Qty: 100 | Refills: 0 | Status: DISCONTINUED | OUTPATIENT
Start: 2025-02-26 | End: 2025-02-27

## 2025-02-26 RX ORDER — HYPROMELLOSE 0.4 %
1 DROPS OPHTHALMIC (EYE) EVERY 12 HOURS
Refills: 0 | Status: DISCONTINUED | OUTPATIENT
Start: 2025-02-26 | End: 2025-03-09

## 2025-02-26 RX ORDER — CLOTRIMAZOLE 1 G/100G
1 CREAM TOPICAL
Refills: 0 | Status: DISCONTINUED | OUTPATIENT
Start: 2025-02-26 | End: 2025-04-25

## 2025-02-26 RX ORDER — VANCOMYCIN HCL IN 5 % DEXTROSE 1.5G/250ML
1250 PLASTIC BAG, INJECTION (ML) INTRAVENOUS EVERY 8 HOURS
Refills: 0 | Status: DISCONTINUED | OUTPATIENT
Start: 2025-02-26 | End: 2025-03-01

## 2025-02-26 RX ORDER — NALOXEGOL OXALATE 12.5 MG/1
12.5 TABLET, FILM COATED ORAL DAILY
Refills: 0 | Status: DISCONTINUED | OUTPATIENT
Start: 2025-02-26 | End: 2025-02-28

## 2025-02-26 RX ADMIN — Medication 1000 MILLIGRAM(S): at 04:45

## 2025-02-26 RX ADMIN — INSULIN LISPRO 1: 100 INJECTION, SOLUTION INTRAVENOUS; SUBCUTANEOUS at 12:12

## 2025-02-26 RX ADMIN — PROPOFOL 71.7 MICROGRAM(S)/KG/MIN: 10 INJECTION, EMULSION INTRAVENOUS at 07:30

## 2025-02-26 RX ADMIN — ARGATROBAN 10 MICROGRAM(S)/KG/MIN: 100 INJECTION, SOLUTION INTRAVENOUS at 07:31

## 2025-02-26 RX ADMIN — IPRATROPIUM BROMIDE AND ALBUTEROL SULFATE 3 MILLILITER(S): .5; 2.5 SOLUTION RESPIRATORY (INHALATION) at 16:04

## 2025-02-26 RX ADMIN — Medication 100 MICROGRAM(S): at 03:40

## 2025-02-26 RX ADMIN — Medication 1 MG/HR: at 07:30

## 2025-02-26 RX ADMIN — IPRATROPIUM BROMIDE AND ALBUTEROL SULFATE 3 MILLILITER(S): .5; 2.5 SOLUTION RESPIRATORY (INHALATION) at 04:10

## 2025-02-26 RX ADMIN — Medication 400 MILLIGRAM(S): at 04:30

## 2025-02-26 RX ADMIN — INSULIN LISPRO 1: 100 INJECTION, SOLUTION INTRAVENOUS; SUBCUTANEOUS at 17:36

## 2025-02-26 RX ADMIN — FUROSEMIDE 60 MILLIGRAM(S): 10 INJECTION INTRAMUSCULAR; INTRAVENOUS at 21:28

## 2025-02-26 RX ADMIN — Medication 15 MILLILITER(S): at 05:38

## 2025-02-26 RX ADMIN — PROPOFOL 71.7 MICROGRAM(S)/KG/MIN: 10 INJECTION, EMULSION INTRAVENOUS at 21:31

## 2025-02-26 RX ADMIN — DEXAMETHASONE 110 MILLIGRAM(S): 0.5 TABLET ORAL at 05:35

## 2025-02-26 RX ADMIN — PROPOFOL 71.7 MICROGRAM(S)/KG/MIN: 10 INJECTION, EMULSION INTRAVENOUS at 03:40

## 2025-02-26 RX ADMIN — Medication 4 MILLILITER(S): at 07:26

## 2025-02-26 RX ADMIN — FUROSEMIDE 60 MILLIGRAM(S): 10 INJECTION INTRAMUSCULAR; INTRAVENOUS at 06:16

## 2025-02-26 RX ADMIN — Medication 15 MILLILITER(S): at 17:35

## 2025-02-26 RX ADMIN — ARGATROBAN 10 MICROGRAM(S)/KG/MIN: 100 INJECTION, SOLUTION INTRAVENOUS at 19:34

## 2025-02-26 RX ADMIN — Medication 71.7 MICROGRAM(S)/KG/MIN: at 19:31

## 2025-02-26 RX ADMIN — PROPOFOL 71.7 MICROGRAM(S)/KG/MIN: 10 INJECTION, EMULSION INTRAVENOUS at 06:54

## 2025-02-26 RX ADMIN — IPRATROPIUM BROMIDE AND ALBUTEROL SULFATE 3 MILLILITER(S): .5; 2.5 SOLUTION RESPIRATORY (INHALATION) at 21:43

## 2025-02-26 RX ADMIN — ARGATROBAN 7.17 MICROGRAM(S)/KG/MIN: 100 INJECTION, SOLUTION INTRAVENOUS at 03:40

## 2025-02-26 RX ADMIN — IPRATROPIUM BROMIDE AND ALBUTEROL SULFATE 3 MILLILITER(S): .5; 2.5 SOLUTION RESPIRATORY (INHALATION) at 07:26

## 2025-02-26 RX ADMIN — ACETAZOLAMIDE 500 MILLIGRAM(S): 250 TABLET ORAL at 14:36

## 2025-02-26 RX ADMIN — Medication 71.7 MICROGRAM(S)/KG/MIN: at 04:30

## 2025-02-26 RX ADMIN — Medication 4 MILLILITER(S): at 16:04

## 2025-02-26 RX ADMIN — CISATRACURIUM BESYLATE 28.7 MICROGRAM(S)/KG/MIN: 10 INJECTION, SOLUTION INTRAVENOUS at 07:30

## 2025-02-26 RX ADMIN — Medication 25 GRAM(S): at 21:27

## 2025-02-26 RX ADMIN — LINEZOLID 300 MILLIGRAM(S): 2 INJECTION, SOLUTION INTRAVENOUS at 05:35

## 2025-02-26 RX ADMIN — Medication 166.67 MILLIGRAM(S): at 13:27

## 2025-02-26 RX ADMIN — Medication 4 MILLILITER(S): at 21:43

## 2025-02-26 RX ADMIN — Medication 1 MG/HR: at 19:34

## 2025-02-26 RX ADMIN — ARGATROBAN 10 MICROGRAM(S)/KG/MIN: 100 INJECTION, SOLUTION INTRAVENOUS at 06:19

## 2025-02-26 RX ADMIN — Medication 71.7 MICROGRAM(S)/KG/MIN: at 07:31

## 2025-02-26 RX ADMIN — POLYETHYLENE GLYCOL 3350 17 GRAM(S): 17 POWDER, FOR SOLUTION ORAL at 17:35

## 2025-02-26 RX ADMIN — ACETAZOLAMIDE 500 MILLIGRAM(S): 250 TABLET ORAL at 21:31

## 2025-02-26 RX ADMIN — Medication 40 MILLIGRAM(S): at 12:12

## 2025-02-26 RX ADMIN — CLOTRIMAZOLE 1 APPLICATION(S): 1 CREAM TOPICAL at 06:54

## 2025-02-26 RX ADMIN — Medication 2 TABLET(S): at 21:32

## 2025-02-26 RX ADMIN — PROPOFOL 71.7 MICROGRAM(S)/KG/MIN: 10 INJECTION, EMULSION INTRAVENOUS at 05:35

## 2025-02-26 RX ADMIN — NALOXEGOL OXALATE 12.5 MILLIGRAM(S): 12.5 TABLET, FILM COATED ORAL at 12:12

## 2025-02-26 RX ADMIN — CISATRACURIUM BESYLATE 28.7 MICROGRAM(S)/KG/MIN: 10 INJECTION, SOLUTION INTRAVENOUS at 19:33

## 2025-02-26 RX ADMIN — PROPOFOL 71.7 MICROGRAM(S)/KG/MIN: 10 INJECTION, EMULSION INTRAVENOUS at 19:34

## 2025-02-26 RX ADMIN — Medication 166.67 MILLIGRAM(S): at 22:56

## 2025-02-26 RX ADMIN — Medication 1 APPLICATION(S): at 17:37

## 2025-02-26 RX ADMIN — Medication 25 GRAM(S): at 05:37

## 2025-02-26 RX ADMIN — PROPOFOL 71.7 MICROGRAM(S)/KG/MIN: 10 INJECTION, EMULSION INTRAVENOUS at 04:04

## 2025-02-26 RX ADMIN — CLOTRIMAZOLE 1 APPLICATION(S): 1 CREAM TOPICAL at 17:35

## 2025-02-26 RX ADMIN — Medication 1 MG/HR: at 03:40

## 2025-02-26 RX ADMIN — FUROSEMIDE 60 MILLIGRAM(S): 10 INJECTION INTRAMUSCULAR; INTRAVENOUS at 14:36

## 2025-02-26 RX ADMIN — Medication 1 APPLICATION(S): at 05:35

## 2025-02-26 NOTE — DIETITIAN INITIAL EVALUATION ADULT - NS FNS DIET ORDER
Diet, NPO with Tube Feed:   Tube Feeding Modality: Orogastric  Jevity 1.2 Elpidio (JEVITY1.2RTH)  Total Volume for 24 Hours (mL): 240  Continuous  Starting Tube Feed Rate {mL per Hour}: 10  Until Goal Tube Feed Rate (mL per Hour): 10  Tube Feed Duration (in Hours): 24  Tube Feed Start Time: 04:00 (02-26-25 @ 04:35)

## 2025-02-26 NOTE — OCCUPATIONAL THERAPY INITIAL EVALUATION ADULT - PERTINENT HX OF CURRENT PROBLEM, REHAB EVAL
39 year old male with history of class III obesity, pAfib (no AC), HTN, BEA, current smoker history of bilateral papilledema attributed to pseudotumor cerebri s/p LP in 2024 with very high opening pressure, who is transferred for VV ECMO for ARDS and severe hypoxia in setting of multifocal PNA. Cannulated on 2/25 and transferred to [facility] for further management.

## 2025-02-26 NOTE — H&P ADULT - NSHPPHYSICALEXAM_GEN_ALL_CORE
PHYSICAL EXAM:  GENERAL: large obese, sedated  HEAD:  Atraumatic, Normocephalic  EYES: pupils equal and reactive  ENT: Moist mucous membranes, oral secretions  NECK: Supple, No JVD  CHEST/LUNG: coarse breath sounds bilaterally  HEART: regular, tachy  ABDOMEN: obese, pannus with erytythema, candidal intertrigo  EXTREMITIES:  edema 3+  NERVOUS SYSTEM:  sedated, paralyzed  MSK: FROM all 4 extremities, full and equal strength  SKIN: No rashes or lesions PHYSICAL EXAM:  GENERAL: large obese, sedated  HEAD:  Atraumatic, Normocephalic  EYES: pupils equal and reactive  ENT: Moist mucous membranes, oral secretions  NECK: Supple, No JVD  CHEST/LUNG: coarse breath sounds bilaterally  HEART: regular, tachy  ABDOMEN: obese, pannus with erythrema, candidal intertrigo  EXTREMITIES:  edema 3+  NERVOUS SYSTEM:  sedated, paralyzed  MSK: FROM all 4 extremities, full and equal strength  SKIN: No rashes or lesions

## 2025-02-26 NOTE — CONSULT NOTE ADULT - PROBLEM SELECTOR RECOMMENDATION 9
Presented for hypoxia, reported to hypoxic despite ventilator support   Started on VV ECMO 2/25  Patient intubated and sedated, unable to participate in conversation  No family or friends at bedside  Concern for PNA, receiving Vanc/Zosyn  Care per MICU Team

## 2025-02-26 NOTE — DIETITIAN INITIAL EVALUATION ADULT - OTHER INFO
.    38 yo M w/ class III obesity, pAfib (no AC), HTN, BEA (on CPAP), current smoker history of b/l papilledema attributed to pseudotumor cerebri s/p LP in 2024 with very high opening pressure, who is transferred for VV ECMO for ARDS and severe hypoxia iso multifocal pna. Cannulated on 2/25 and transferred to [facility] for further management.      Pt on VV ECMO Day 1. Remains NPO with trickle feeding - Jevity 1.2, @10 ml/hr = 240 mL daily. Receiving high dose propofol 72.2 ml/hr. Will provide ~1900 non-nutritive lipid calories from Propofol over 24 hrs at current rate.   Elevated Glucose readings on steroid therapy. HgA1C noted at 6.7%. Altered skin integrity, right buttock with DTI.     HIE weight trends: (10/2024) 204 kg (12/2024) 216 kg (2/2025) 240 kg. Currently with 2+ generalized edema and 4+ lower extremity edema.    Would suggest EN regimen with Peptamen Intense VHP and supplemental LPS to more appropriately meet critical care needs.

## 2025-02-26 NOTE — H&P ADULT - NSHPSOURCEINFORD_GEN_ALL_CORE
Chart(s)/Mother/Other Healthcare Facility Chart(s)/Mother/Other Family Member/Other Healthcare Facility

## 2025-02-26 NOTE — PATIENT PROFILE ADULT - FALL HARM RISK - ATTEMPT OOB
Plan: Discussed ilk and possible SE pending ultrasound due to location of cyst
Otc Regimen: Warm moist compresses
Detail Level: Zone
No

## 2025-02-26 NOTE — DIETITIAN INITIAL EVALUATION ADULT - ADD RECOMMEND
1. Peptamen Intense VHP @ 10 mL/hr, increase as tolerated to reach the goal rate of 35 mL/ hr = 840 kcal, 77 gm pro.  2. Liquid Protein Supplement (LPS) 1 Packet 3x daily = 90 mL, 300 kcal, 45 gm pro.  (TOTAL DAILY PRO = 122 GM).  3. TF + Propofol calories will provide ~1984 kcal   4. Monitor BG trends and Triglycerides.  5. Monitor EN tolerance, nutrition related lab values, weight trends, BMs/GI distress, hydration status, skin integrity.   6. Adjust rate/volume/duration/free water provision of enteral feeds in accordance with patient tolerance and needs.

## 2025-02-26 NOTE — CHART NOTE - NSCHARTNOTEFT_GEN_A_CORE
: Chance Rousseau    INDICATION: Shock, Respiratory failure, VV ECMO    PROCEDURE:  [X] LIMITED ECHO  [X] LIMITED CHEST  [ ] LIMITED RETROPERITONEAL  [ ] LIMITED ABDOMINAL  [ ] LIMITED DVT  [ ] NEEDLE GUIDANCE VASCULAR  [ ] NEEDLE GUIDANCE THORACENTESIS  [ ] NEEDLE GUIDANCE PARACENTESIS  [ ] NEEDLE GUIDANCE PERICARDIOCENTESIS  [ ] OTHER    FINDINGS: Patchy area of B lines anteriorly. Bibasilar consolidation pattern. No pleural effusions. Grossly normal LV systolic function. RV at least equal in size to LV with grossly preserved RV systolic function. IVC 3 cm.      INTERPRETATION: Alveolar interstitial pattern. Bibasilar pneumonia vs atelectasis. Grossly normal systolic function, mild RV enlargement. Volume overloaded.    Images stored on AT Internet.    Jae Ruosseau MD  Pulmonary & Critical Care

## 2025-02-26 NOTE — DIETITIAN INITIAL EVALUATION ADULT - SIGNS/SYMPTOMS
Increased nutrient needs related to increased metabolic demand, impaired skin integrity Increased metabolic demands in critical illness, impaired skin integrity, on trickle feeds.

## 2025-02-26 NOTE — PATIENT PROFILE ADULT - NSPROPOAURINARYCATHETER_GEN_A_NUR
Pt. Son calling in States that she has been incontinent, foul smelling urine, no way to make it into office to leave a urine sample requesting medication for UTI yes

## 2025-02-26 NOTE — CONSULT NOTE ADULT - PROBLEM SELECTOR RECOMMENDATION 2
Per Chart review, patient has a life-partner  Brea Mari [phone] - Surrogate Medical Decision Maker Per Chart review, patient has a life-partner Brea Mari [phone]  although also documented that they are "on/off" and pt currently living with mom and brother;   Per conversation with MICU, previously teams (outside hospital) were communicating with patient's mother.  Further clarification needed to determine LNOK. No contact information readily available for mom.

## 2025-02-26 NOTE — CONSULT NOTE ADULT - PROBLEM SELECTOR RECOMMENDATION 3
Palliative care consulted for goals of care and complex medical decision making.    In the event of newly developing, evolving, or worsening symptoms, please contact the Palliative Medicine team via pager (if the patient is at [facility] #[phone] or if the patient is at [facility] #[phone]) The Geriatric and Palliative Medicine service has coverage 24 hours a day/ 7 days a week to provide medical recommendations regarding symptom management needs via telephone.

## 2025-02-26 NOTE — CONSULT NOTE ADULT - NSCONSULTADDITIONALINFOA_GEN_ALL_CORE
Patient seen under the direct supervision of Dr. Reardon who was directly involved in patient care and decision making.    This note is not finalized until reviewed and signed by Attending Physician Dr. Reardon.    Daryl Guillory DO   Fellow - Hospice & Palliative Medicine

## 2025-02-26 NOTE — PROCEDURE NOTE - NSBRONCHPROCDETAILS_GEN_A_CORE_FT
Indication: pneumonia, ARDS    Operators: Soham Fletcher    Pre-op Dx: ARDS    Preop medications: cisatracurium, fentanyl, propofol    Findings:  Bronchoscope inserted through ETT. ETT noted to be in good position. Airway evaluation revealed boggy, erythematous mucosa with copious thick purulent secretions most significant in the bilateral lower lobes and dependent areas. LLL notable for the presence of some blood with small clots, that cleared upon serial lavage most c/w blood pooling from traumatic intubation and not alveolar in origin. Normal bronchial anatomy. BAL LLL and RLL with return of thick, purulent, odious secretions.  Bronchoscope then withdrawn from ETT. Therapeutic aspiration of secretions form all segments.    Specimens: RLL, LLL BAL for micro

## 2025-02-26 NOTE — CONSULT NOTE ADULT - SUBJECTIVE AND OBJECTIVE BOX
HPI:  40 yo M w/ class III obesity, pAfib (no AC), HTN, BEA (on CPAP), history of b/l papilledema attributed to pseudotumor cerebri s/p LP in 2024 with very high opening pressure, who is transferred for VV ECMO for ARDS and severe hypoxia. Patient initially presented to [place] on 2/24 for SOB and b/l LE edema. 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 He is a current every day smoker of Newports and marijuana, but does not drink or do other drugs. Currently lives with his mother. There is a long term partner in his life, but they are not , and they have an on/off relationship currently not very involved with each other as per the patient's mother and aunt.  At [facility] where he was getting an eval last year for shortness of breath, he had a sleep study and was diagnosed with sleep apnea, prescribed a PAP machine, which he has in his room but the mother is not sure how many nights per week he uses it. Recently, the last day or two, his mother and brother have been under the weather with URIs and the patient 2 days ago started to develop a ruynny nose, ough, some wheezing of the chest, as well as worsening shortness of breath and fevers at home. He called his aunt who told him to go get checked out and then he landed at the [place] ED. Patient found to be reportedly hypoxemic to 70% on RA with worsening respiratory status/secretions and somnolence in the ED. Patient was intubated with difficulty (7 attempts) due to very large tongue secretions. Despite optimal vent management, patient remained hypoxemic. Unable to prone due to obesity. Patient cannulated for VV ECMO on 2/25 and transferred to [facility] for further management.     [place] labs notable for MRSA PCR -, Fluvid -, urine legionella -, sputum gram stain  with GPC and GPR    CTA chest on 2/24 negative for pulmonary embolism, notable for patchy bilateral lower lobe opacities, hepatomegaly, mild ascites, edema/induration of the abdominal pannus.    LE duplex on 2/25 negative for DVT    TTE on 2/25 showed LV EF 61%, enlarged RV with TAPSE 2.1cm, ePASP at least 49 (Patient had 10/2024 TTE with normal LV and RV with ePASP 14).    Only home med is Lasix. Not on acetazolamide for pseudotumor or on Wegovy (was pending auth) (26 Feb 2025 01:48)    Patient seen at bedside today afternoon by medical team. Patient current sedated, does not appear overtly to be in distress.     PERTINENT PM/SXH:   HTN (hypertension)    Atrial fibrillation    Papilledema of both eyes    Chronic sinusitis    Morbid obesity      No significant past surgical history      FAMILY HISTORY:    Family Hx substance abuse [ ]yes [ ]no  ITEMS NOT CHECKED ARE NOT PRESENT    SOCIAL HISTORY:   Significant other/partner[ ]  Children[ ]  Faith/Spirituality:  Substance hx:  [ ]   Tobacco hx:  [ ]   Alcohol hx: [ ]   Home Opioid hx:  [X] I-Stop Reference No: [id number]  Living Situation: [ ]Home  [ ]Long term care  [ ]Rehab [ ]Other    ADVANCE DIRECTIVES:    DNR/MOLST  [ ]  Living Will  [ ]   DECISION MAKER(s):  [ ] Health Care Proxy(s)  [ ] Surrogate(s)  [ ] Guardian           Name(s): Phone Number(s):    BASELINE (I)ADL(s) (prior to admission):  [place]: [X]Total  [ ] Moderate [ ]Dependent    Allergies    No Known Allergies    Intolerances    MEDICATIONS  (STANDING):  acetaZOLAMIDE Injectable 500 milliGRAM(s) IV Push once  albuterol/ipratropium for Nebulization 3 milliLiter(s) Nebulizer every 6 hours  argatroban Infusion 0.7 MICROgram(s)/kG/Min (10 mL/Hr) IV Continuous <Continuous>  chlorhexidine 0.12% Liquid 15 milliLiter(s) Oral Mucosa every 12 hours  chlorhexidine 2% Cloths 1 Application(s) Topical <User Schedule>  cisatracurium Infusion 2 MICROgram(s)/kG/Min (28.7 mL/Hr) IV Continuous <Continuous>  clotrimazole 1% Cream 1 Application(s) Topical two times a day  dexAMETHasone  IVPB 20 milliGRAM(s) IV Intermittent daily  dextrose 50% Injectable 25 Gram(s) IV Push once  dextrose 50% Injectable 12.5 Gram(s) IV Push once  dextrose 50% Injectable 25 Gram(s) IV Push once  esmolol  Infusion 49.626 MICROgram(s)/kG/Min (71.7 mL/Hr) IV Continuous <Continuous>  furosemide   Injectable 60 milliGRAM(s) IV Push once  glucagon  Injectable 1 milliGRAM(s) IntraMuscular once  HYDROmorphone Infusion. 1 mG/Hr (1 mL/Hr) IV Continuous <Continuous>  insulin lispro (ADMELOG) corrective regimen sliding scale   SubCutaneous every 6 hours  naloxegol 12.5 milliGRAM(s) Oral daily  norepinephrine Infusion 0.05 MICROgram(s)/kG/Min (11.2 mL/Hr) IV Continuous <Continuous>  pantoprazole  Injectable 40 milliGRAM(s) IV Push daily  petrolatum Ophthalmic Ointment 1 Application(s) Both EYES every 12 hours  piperacillin/tazobactam IVPB.. 3.375 Gram(s) IV Intermittent every 8 hours  polyethylene glycol 3350 17 Gram(s) Oral two times a day  propofol Infusion 50 MICROgram(s)/kG/Min (71.7 mL/Hr) IV Continuous <Continuous>  senna 2 Tablet(s) Oral at bedtime  sodium chloride 3%  Inhalation 4 milliLiter(s) Inhalation every 6 hours  vancomycin  IVPB      vancomycin  IVPB 1250 milliGRAM(s) IV Intermittent every 8 hours    MEDICATIONS  (PRN):    PRESENT SYMPTOMS: [X]Unable to self-report see CPOT, PAINADs, RDOS  Source if other than patient:  [ ]Family   [ ]Team     Pain: [ ]yes [ ]no  QOL impact -   Location -                    Aggravating factors -  Quality -  Radiation -  Timing-  Severity (0-10 scale):  Minimal acceptable level (0-10 scale):     PCSSQ [Palliative Care Spiritual Screening Question]   Severity (0-10):  Score of 4 or > indicate consideration of Chaplaincy referral.  Chaplaincy Referral: [ ] yes [ ] refused [ ] following    Caregiver [place]? : [X] yes [ ] no [ ] deferred:  Social work referral [X] Patient & Family Centered Care Referral [ ]     Anticipatory Grief Present?: [X] yes [ ] no  [ ] deferred: Social work referral [X]  Patient & Family Centered Care Referral [ ]     SYMPTOMS: Unable to obtain due to medical acuity  Dyspnea:                           [ ]Mild [ ]Moderate [ ]Severe  Anxiety:                             [ ]Mild [ ]Moderate [ ]Severe  Fatigue:                             [ ]Mild [ ]Moderate [ ]Severe  Nausea/Vomiting:              [ ]Mild [ ]Moderate [ ]Severe  Loss of appetite:                [ ]Mild [ ]Moderate [ ]Severe  Constipation:                     [ ]Mild [ ]Moderate [ ]Severe    Other Symptoms:  [ ]All other review of systems negative     PHYSICAL EXAM:  Vital Signs Last 24 Hrs  T(C): 37.2 (26 Feb 2025 12:00), Max: 38 (25 Feb 2025 18:00)  T(F): 99 (26 Feb 2025 12:00), Max: 100.4 (25 Feb 2025 18:00)  HR: 83 (26 Feb 2025 13:00) (78 - 107)  BP: 97/54 (25 Feb 2025 18:30) (97/54 - 128/76)  BP(mean): 66 (25 Feb 2025 18:30) (66 - 90)  RR: 14 (26 Feb 2025 13:00) (13 - 26)  SpO2: 96% (26 Feb 2025 13:00) (80% - 97%)    Parameters below as of 26 Feb 2025 13:00  Patient On (Oxygen Delivery Method): ventilator    O2 Concentration (%): 100 I&O's Summary    25 Feb 2025 07:01  -  26 Feb 2025 07:00  --------------------------------------------------------  IN: 1954.5 mL / OUT: 2520 mL / NET: -565.5 mL    26 Feb 2025 07:01  -  26 Feb 2025 14:17  --------------------------------------------------------  IN: 1929.2 mL / OUT: 1975 mL / NET: -45.8 mL      GENERAL: [ ]Cachexia    [ ]Alert  [ ]Oriented x   [ ]Lethargic  [ ]Unarousable  [ ]Verbal  [X]Non-Verbal  Behavioral:   [ ] Anxiety  [ ] Delirium [ ] Agitation [X] Other Sedated  HEENT:  [ ]Normal   [ ]Dry mouth   [ ]ET Tube/Trach  [ ]Oral lesions  PULMONARY:   [X]Clear [ ]Tachypnea  [ ]Audible excessive secretions   [ ]Rhonchi        [ ]Right [ ]Left [ ]Bilateral  [ ]Crackles        [ ]Right [ ]Left [ ]Bilateral  [ ]Wheezing     [ ]Right [ ]Left [ ]Bilateral  [ ]Diminished breath sounds [ ]right [ ]left [ ]bilateral  CARDIOVASCULAR:    [X]Regular [ ]Irregular [ ]Tachy  [ ]Parviz [ ]Murmur [ ]Other  GASTROINTESTINAL:  [X]Soft  [ ]Distended   [ ]+BS  [ ]Non tender [ ]Tender  [ ]Other [ ]PEG [ ]OGT/ NGT  Last BM: Unknown  GENITOURINARY:  [ ]Normal [ ] Incontinent   [ ]Oliguria/Anuria   [ ]Winslow  MUSCULOSKELETAL:   [ ]Normal   [ ]Weakness  [X]Bed/Wheelchair bound [ ]Edema  NEUROLOGIC:   [ ]No focal deficits  [ ]Cognitive impairment  [ ]Dysphagia [ ]Dysarthria [ ]Paresis [X]Other Sedated   SKIN:   [ ]Normal  [ ]Rash  [X]Other - Please see RN documentation which I have reviewed  [ ]Pressure ulcer(s)       Present on admission [ ]y [ ]n    CRITICAL CARE:  [ ] Shock Present  [ ]Septic [ ]Cardiogenic [ ]Neurologic [ ]Hypovolemic  [ ]  Vasopressors [ ]  Inotropes   [ ]Respiratory failure present [ ]Mechanical ventilation [ ]Non-invasive ventilatory support [ ]High flow  Mode: AC/ CMV (Assist Control/ Continuous Mandatory Ventilation), RR (machine): 14, FiO2: 100, PEEP: 16, ITime: 1, MAP: 19, PC: 12, PIP: 28  [ ]Acute  [ ]Chronic [ ]Hypoxic  [ ]Hypercarbic [ ]Other  [X]Other organ failure: Lung     LABS:                        11.2   20.49 )-----------( 144      ( 26 Feb 2025 11:15 )             37.8   02-26    138  |  99  |  24[H]  ----------------------------<  155[H]  4.1   |  29  |  1.48[H]    Ca    8.0[L]      26 Feb 2025 13:20  Phos  3.5     02-26  Mg     2.20     02-26    TPro  6.4  /  Alb  2.6[L]  /  TBili  1.0  /  DBili  x   /  AST  73[H]  /  ALT  60[H]  /  AlkPhos  52  02-26  PT/INR - ( 26 Feb 2025 05:30 )   PT: 23.4 sec;   INR: 1.98 ratio         PTT - ( 26 Feb 2025 10:00 )  PTT:52.0 sec    Urinalysis Basic - ( 26 Feb 2025 13:20 )    Color: x / Appearance: x / SG: x / pH: x  Gluc: 155 mg/dL / Ketone: x  / Bili: x / Urobili: x   Blood: x / Protein: x / Nitrite: x   Leuk Esterase: x / RBC: x / WBC x   Sq Epi: x / Non Sq Epi: x / Bacteria: x      RADIOLOGY & ADDITIONAL STUDIES:  < from: Xray Chest 1 View- PORTABLE-Urgent (Xray Chest 1 View- PORTABLE-Urgent .) (02.26.25 @ 07:42) >  IMPRESSION:  Follow-up with enteric tube in the stomach with endotracheal tube and   ECMO catheter as seen before.  Small right effusion with fleeting opacities in the lungs probably   atelectasis in this patient with ARDS.    < end of copied text >      PROTEIN CALORIE MALNUTRITION PRESENT: [ ]mild [ ]moderate [ ]severe [ ]underweight [ ]morbid obesity  https://www.andeal.org/vault/2440/web/files/ONC/Table_Clinical%20Characteristics%20to%20Document%20Malnutrition-White%20JV%20et%20al%202012.pdf    Height (cm): 170.2 (02-26-25 @ 01:48), 170.2 (02-25-25 @ 16:00), 177.8 (10-05-24 @ 01:35)  Weight (kg): 240.8 (02-26-25 @ 08:00), 239.2 (02-25-25 @ 20:00), 172.4 (10-05-24 @ 01:35)  BMI (kg/m2): 83.1 (02-26-25 @ 08:00), 82.6 (02-26-25 @ 01:48), 82.6 (02-25-25 @ 20:00)    [ ]PPSV2 < or = to 30% [ ]significant weight loss  [ ]poor nutritional intake  [ ]anasarca[ ]Artificial Nutrition      Other REFERRALS:  [ ]Hospice  [ ]Child Life  [ ]Social Work  [ ]Case management [ ]Holistic Therapy     Care Coordination Assessment 201 [C. Provider] (02-25-25 @ 12:02)      Palliative Performance Scale:  http://npcrc.org/files/news/palliative_performance_scale_ppsv2.pdf  (Ctrl +  left click to view)  Respiratory Distress Observation Tool:  https://homecareinformation.net/handouts/hen/Respiratory_Distress_Observation_Scale.pdf (Ctrl +  left click to view)  PAINAD Score:  http://geriatrictoolkit.[place].[facility]/cog/painad.pdf (Ctrl +  left click to view)   HPI:  38 yo M w/ class III obesity, pAfib (no AC), HTN, BEA (on CPAP), history of b/l papilledema attributed to pseudotumor cerebri s/p LP in 2024 with very high opening pressure, who is transferred for VV ECMO for ARDS and severe hypoxia. Patient initially presented to [place] on 2/24 for SOB and b/l LE edema. 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 He is a current every day smoker of Newports and marijuana, but does not drink or do other drugs. Currently lives with his mother. There is a long term partner in his life, but they are not , and they have an on/off relationship currently not very involved with each other as per the patient's mother and aunt.  At [facility] where he was getting an eval last year for shortness of breath, he had a sleep study and was diagnosed with sleep apnea, prescribed a PAP machine, which he has in his room but the mother is not sure how many nights per week he uses it. Recently, the last day or two, his mother and brother have been under the weather with URIs and the patient 2 days ago started to develop a ruynny nose, ough, some wheezing of the chest, as well as worsening shortness of breath and fevers at home. He called his aunt who told him to go get checked out and then he landed at the [place] ED. Patient found to be reportedly hypoxemic to 70% on RA with worsening respiratory status/secretions and somnolence in the ED. Patient was intubated with difficulty (7 attempts) due to very large tongue secretions. Despite optimal vent management, patient remained hypoxemic. Unable to prone due to obesity. Patient cannulated for VV ECMO on 2/25 and transferred to [facility] for further management.     [place] labs notable for MRSA PCR -, Fluvid -, urine legionella -, sputum gram stain  with GPC and GPR    CTA chest on 2/24 negative for pulmonary embolism, notable for patchy bilateral lower lobe opacities, hepatomegaly, mild ascites, edema/induration of the abdominal pannus.    LE duplex on 2/25 negative for DVT    TTE on 2/25 showed LV EF 61%, enlarged RV with TAPSE 2.1cm, ePASP at least 49 (Patient had 10/2024 TTE with normal LV and RV with ePASP 14).    Only home med is Lasix. Not on acetazolamide for pseudotumor or on Wegovy (was pending auth) (26 Feb 2025 01:48)    Patient seen at bedside today afternoon by medical team. Patient current sedated and intubated, does not appear overtly to be in distress.     PERTINENT PM/SXH:   HTN (hypertension)    Atrial fibrillation    Papilledema of both eyes    Chronic sinusitis    Morbid obesity      No significant past surgical history      FAMILY HISTORY:    Family Hx substance abuse [ ]yes [ ]no  ITEMS NOT CHECKED ARE NOT PRESENT    SOCIAL HISTORY:   Significant other/partner[ ]  Children[ ]  Denominational/Spirituality:  Substance hx:  [ ]   Tobacco hx:  [ ]   Alcohol hx: [ ]   Home Opioid hx:  [X] I-Stop Reference No: [id number]  Living Situation: [ ]Home  [ ]Long term care  [ ]Rehab [ ]Other    ADVANCE DIRECTIVES:    DNR/MOLST  [ ]  Living Will  [ ]   DECISION MAKER(s):  [ ] Health Care Proxy(s)  [ ] Surrogate(s)  [ ] Guardian           Name(s): Phone Number(s):    BASELINE (I)ADL(s) (prior to admission):  [place]: [X]Total  [ ] Moderate [ ]Dependent    Allergies    No Known Allergies    Intolerances    MEDICATIONS  (STANDING):  acetaZOLAMIDE Injectable 500 milliGRAM(s) IV Push once  albuterol/ipratropium for Nebulization 3 milliLiter(s) Nebulizer every 6 hours  argatroban Infusion 0.7 MICROgram(s)/kG/Min (10 mL/Hr) IV Continuous <Continuous>  chlorhexidine 0.12% Liquid 15 milliLiter(s) Oral Mucosa every 12 hours  chlorhexidine 2% Cloths 1 Application(s) Topical <User Schedule>  cisatracurium Infusion 2 MICROgram(s)/kG/Min (28.7 mL/Hr) IV Continuous <Continuous>  clotrimazole 1% Cream 1 Application(s) Topical two times a day  dexAMETHasone  IVPB 20 milliGRAM(s) IV Intermittent daily  dextrose 50% Injectable 25 Gram(s) IV Push once  dextrose 50% Injectable 12.5 Gram(s) IV Push once  dextrose 50% Injectable 25 Gram(s) IV Push once  esmolol  Infusion 49.626 MICROgram(s)/kG/Min (71.7 mL/Hr) IV Continuous <Continuous>  furosemide   Injectable 60 milliGRAM(s) IV Push once  glucagon  Injectable 1 milliGRAM(s) IntraMuscular once  HYDROmorphone Infusion. 1 mG/Hr (1 mL/Hr) IV Continuous <Continuous>  insulin lispro (ADMELOG) corrective regimen sliding scale   SubCutaneous every 6 hours  naloxegol 12.5 milliGRAM(s) Oral daily  norepinephrine Infusion 0.05 MICROgram(s)/kG/Min (11.2 mL/Hr) IV Continuous <Continuous>  pantoprazole  Injectable 40 milliGRAM(s) IV Push daily  petrolatum Ophthalmic Ointment 1 Application(s) Both EYES every 12 hours  piperacillin/tazobactam IVPB.. 3.375 Gram(s) IV Intermittent every 8 hours  polyethylene glycol 3350 17 Gram(s) Oral two times a day  propofol Infusion 50 MICROgram(s)/kG/Min (71.7 mL/Hr) IV Continuous <Continuous>  senna 2 Tablet(s) Oral at bedtime  sodium chloride 3%  Inhalation 4 milliLiter(s) Inhalation every 6 hours  vancomycin  IVPB      vancomycin  IVPB 1250 milliGRAM(s) IV Intermittent every 8 hours    MEDICATIONS  (PRN):    PRESENT SYMPTOMS: [X]Unable to self-report see CPOT, PAINADs, RDOS  Source if other than patient:  [ ]Family   [ ]Team     Pain: [ ]yes [ ]no  QOL impact -   Location -                    Aggravating factors -  Quality -  Radiation -  Timing-  Severity (0-10 scale):  Minimal acceptable level (0-10 scale):     PCSSQ [Palliative Care Spiritual Screening Question]   Severity (0-10):  Score of 4 or > indicate consideration of Chaplaincy referral.  Chaplaincy Referral: [ ] yes [ ] refused [ ] following    Caregiver [place]? : [X] yes [ ] no [ ] deferred:  Social work referral [X] Patient & Family Centered Care Referral [ ]     Anticipatory Grief Present?: [X] yes [ ] no  [ ] deferred: Social work referral [X]  Patient & Family Centered Care Referral [ ]     SYMPTOMS: Unable to obtain due to medical acuity  Dyspnea:                           [ ]Mild [ ]Moderate [ ]Severe  Anxiety:                             [ ]Mild [ ]Moderate [ ]Severe  Fatigue:                             [ ]Mild [ ]Moderate [ ]Severe  Nausea/Vomiting:              [ ]Mild [ ]Moderate [ ]Severe  Loss of appetite:                [ ]Mild [ ]Moderate [ ]Severe  Constipation:                     [ ]Mild [ ]Moderate [ ]Severe    Other Symptoms:  [ ]All other review of systems negative     PHYSICAL EXAM:  Vital Signs Last 24 Hrs  T(C): 37.2 (26 Feb 2025 12:00), Max: 38 (25 Feb 2025 18:00)  T(F): 99 (26 Feb 2025 12:00), Max: 100.4 (25 Feb 2025 18:00)  HR: 83 (26 Feb 2025 13:00) (78 - 107)  BP: 97/54 (25 Feb 2025 18:30) (97/54 - 128/76)  BP(mean): 66 (25 Feb 2025 18:30) (66 - 90)  RR: 14 (26 Feb 2025 13:00) (13 - 26)  SpO2: 96% (26 Feb 2025 13:00) (80% - 97%)    Parameters below as of 26 Feb 2025 13:00  Patient On (Oxygen Delivery Method): ventilator    O2 Concentration (%): 100 I&O's Summary    25 Feb 2025 07:01  -  26 Feb 2025 07:00  --------------------------------------------------------  IN: 1954.5 mL / OUT: 2520 mL / NET: -565.5 mL    26 Feb 2025 07:01  -  26 Feb 2025 14:17  --------------------------------------------------------  IN: 1929.2 mL / OUT: 1975 mL / NET: -45.8 mL      GENERAL: [ ]Cachexia    [ ]Alert  [ ]Oriented x   [ ]Lethargic  [ ]Unarousable  [ ]Verbal  [X]Non-Verbal  Behavioral:   [ ] Anxiety  [ ] Delirium [ ] Agitation [X] Other Sedated  HEENT:  [ ]Normal   [ ]Dry mouth   [ ]ET Tube/Trach  [ ]Oral lesions  PULMONARY: - Intubated   [X]Clear [ ]Tachypnea  [ ]Audible excessive secretions   [ ]Rhonchi        [ ]Right [ ]Left [ ]Bilateral  [ ]Crackles        [ ]Right [ ]Left [ ]Bilateral  [ ]Wheezing     [ ]Right [ ]Left [ ]Bilateral  [ ]Diminished breath sounds [ ]right [ ]left [ ]bilateral  CARDIOVASCULAR:    [X]Regular [ ]Irregular [ ]Tachy  [ ]Parviz [ ]Murmur [ ]Other  GASTROINTESTINAL:  [X]Soft  [ ]Distended   [ ]+BS  [ ]Non tender [ ]Tender  [ ]Other [ ]PEG [ ]OGT/ NGT  Last BM: Unknown  GENITOURINARY:  [ ]Normal [ ] Incontinent   [ ]Oliguria/Anuria   [ ]W[place]low  MUSCULOSKELETAL:   [ ]Normal   [ ]Weakness  [X]Bed/Wheelchair bound [ ]Edema  NEUROLOGIC:   [ ]No focal deficits  [ ]Cognitive impairment  [ ]Dysphagia [ ]Dysarthria [ ]Paresis [X]Other Sedated   SKIN:   [ ]Normal  [ ]Rash  [X]Other - Please see RN documentation which I have reviewed  [ ]Pressure ulcer(s)       Present on admission [ ]y [ ]n    CRITICAL CARE:  [ ] Shock Present  [ ]Septic [ ]Cardiogenic [ ]Neurologic [ ]Hypovolemic  [ ]  Vasopressors [ ]  Inotropes   [ ]Respiratory failure present [ ]Mechanical ventilation [ ]Non-invasive ventilatory support [ ]High flow  Mode: AC/ CMV (Assist Control/ Continuous Mandatory Ventilation), RR (machine): 14, FiO2: 100, PEEP: 16, ITime: 1, MAP: 19, PC: 12, PIP: 28  [ ]Acute  [ ]Chronic [ ]Hypoxic  [ ]Hypercarbic [ ]Other  [X]Other organ failure: Lung     LABS:                        11.2   20.49 )-----------( 144      ( 26 Feb 2025 11:15 )             37.8   02-26    138  |  99  |  24[H]  ----------------------------<  155[H]  4.1   |  29  |  1.48[H]    Ca    8.0[L]      26 Feb 2025 13:20  Phos  3.5     02-26  Mg     2.20     02-26    TPro  6.4  /  Alb  2.6[L]  /  TBili  1.0  /  DBili  x   /  AST  73[H]  /  ALT  60[H]  /  AlkPhos  52  02-26  PT/INR - ( 26 Feb 2025 05:30 )   PT: 23.4 sec;   INR: 1.98 ratio         PTT - ( 26 Feb 2025 10:00 )  PTT:52.0 sec    Urinalysis Basic - ( 26 Feb 2025 13:20 )    Color: x / Appearance: x / SG: x / pH: x  Gluc: 155 mg/dL / Ketone: x  / Bili: x / Urobili: x   Blood: x / Protein: x / Nitrite: x   Leuk Esterase: x / RBC: x / WBC x   Sq Epi: x / Non Sq Epi: x / Bacteria: x      RADIOLOGY & ADDITIONAL STUDIES:  < from: Xray Chest 1 View- PORTABLE-Urgent (Xray Chest 1 View- PORTABLE-Urgent .) (02.26.25 @ 07:42) >  IMPRESSION:  Follow-up with enteric tube in the stomach with endotracheal tube and   ECMO catheter as seen before.  Small right effusion with fleeting opacities in the lungs probably   atelectasis in this patient with ARDS.    < end of copied text >      PROTEIN CALORIE MALNUTRITION PRESENT: [ ]mild [ ]moderate [ ]severe [ ]underweight [ ]morbid obesity  https://www.andeal.org/vault/2440/web/files/ONC/Table_Clinical%20Characteristics%20to%20Document%20Malnutrition-White%20JV%20et%20al%202012.pdf    Height (cm): 170.2 (02-26-25 @ 01:48), 170.2 (02-25-25 @ 16:00), 177.8 (10-05-24 @ 01:35)  Weight (kg): 240.8 (02-26-25 @ 08:00), 239.2 (02-25-25 @ 20:00), 172.4 (10-05-24 @ 01:35)  BMI (kg/m2): 83.1 (02-26-25 @ 08:00), 82.6 (02-26-25 @ 01:48), 82.6 (02-25-25 @ 20:00)    [ ]PPSV2 < or = to 30% [ ]significant weight loss  [ ]poor nutritional intake  [ ]anasarca[ ]Artificial Nutrition      Other REFERRALS:  [ ]Hospice  [ ]Child Life  [ ]Social Work  [ ]Case management [ ]Holistic Therapy     Care Coordination Assessment 201 [C. Provider] (02-25-25 @ 12:02)      Palliative Performance Scale:  http://npcrc.org/files/news/palliative_performance_scale_ppsv2.pdf  (Ctrl +  left click to view)  Respiratory Distress Observation Tool:  https://homecareinformation.net/handouts/hen/Respiratory_Distress_Observation_Scale.pdf (Ctrl +  left click to view)  PAINAD Score:  http://geriatrictoolkit.[place].[facility]/cog/painad.pdf (Ctrl +  left click to view)   HPI:  40 yo M w/ class III obesity, pAfib (no AC), HTN, BEA (on CPAP), history of b/l papilledema attributed to pseudotumor cerebri s/p LP in 2024 with very high opening pressure, who is transferred for VV ECMO for ARDS and severe hypoxia. Patient initially presented to [place] on 2/24 for SOB and b/l LE edema. 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 He is a current every day smoker of Newports and marijuana, but does not drink or do other drugs. Currently lives with his mother. There is a long term partner in his life, but they are not , and they have an on/off relationship currently not very involved with each other as per the patient's mother and aunt.  At [facility] where he was getting an eval last year for shortness of breath, he had a sleep study and was diagnosed with sleep apnea, prescribed a PAP machine, which he has in his room but the mother is not sure how many nights per week he uses it. Recently, the last day or two, his mother and brother have been under the weather with URIs and the patient 2 days ago started to develop a ruynny nose, ough, some wheezing of the chest, as well as worsening shortness of breath and fevers at home. He called his aunt who told him to go get checked out and then he landed at the [facility] ED. Patient found to be reportedly hypoxemic to 70% on RA with worsening respiratory status/secretions and somnolence in the ED. Patient was intubated with difficulty (7 attempts) due to very large tongue secretions. Despite optimal vent management, patient remained hypoxemic. Unable to prone due to obesity. Patient cannulated for VV ECMO on 2/25 and transferred to [facility] for further management.     [facility] labs notable for MRSA PCR -, Fluvid -, urine legionella -, sputum gram stain  with GPC and GPR    CTA chest on 2/24 negative for pulmonary embolism, notable for patchy bilateral lower lobe opacities, hepatomegaly, mild ascites, edema/induration of the abdominal pannus.    LE duplex on 2/25 negative for DVT    TTE on 2/25 showed LV EF 61%, enlarged RV with TAPSE 2.1cm, ePASP at least 49 (Patient had 10/2024 TTE with normal LV and RV with ePASP 14).    Only home med is Lasix. Not on acetazolamide for pseudotumor or on Wegovy (was pending auth) (26 Feb 2025 01:48)    Palliative consulted for complex medical decision making and symptom management in the setting of serious illness.  Patient seen at bedside today afternoon by medical team. Patient current sedated and intubated, does not appear overtly to be in distress.     PERTINENT PM/SXH:   HTN (hypertension)    Atrial fibrillation    Papilledema of both eyes    Chronic sinusitis    Morbid obesity      No significant past surgical history      FAMILY HISTORY:    Family Hx substance abuse [ ]yes [ ]no  ITEMS NOT CHECKED ARE NOT PRESENT    SOCIAL HISTORY:   Significant other/partner[ ]  Children[ ]  B[employer]/Spirituality:  Substance hx:  [ ]   Tobacco hx:  [ ]   Alcohol hx: [ ]   Home Opioid hx:  [X] I-Stop Reference No: [id number]  Living Situation: [x ]Home  [ ]Long term care  [ ]Rehab [ ]Other    ADVANCE DIRECTIVES:    DNR/MOLST  [ ]  Living Will  [ ]   DECISION MAKER(s):  [ ] Health Care Proxy(s)  [ x] Surrogate(s)  [ ] Guardian           Name(s): Phone Number(s): Brea Mari [phone]     BASELINE (I)ADL(s) (prior to admission):  [place]: [X]Total  [ ] Moderate [ ]Dependent    Allergies    No Known Allergies    Intolerances    MEDICATIONS  (STANDING):  acetaZOLAMIDE Injectable 500 milliGRAM(s) IV Push once  albuterol/ipratropium for Nebulization 3 milliLiter(s) Nebulizer every 6 hours  argatroban Infusion 0.7 MICROgram(s)/kG/Min (10 mL/Hr) IV Continuous <Continuous>  chlorhexidine 0.12% Liquid 15 milliLiter(s) Oral Mucosa every 12 hours  chlorhexidine 2% Cloths 1 Application(s) Topical <User Schedule>  cisatracurium Infusion 2 MICROgram(s)/kG/Min (28.7 mL/Hr) IV Continuous <Continuous>  clotrimazole 1% Cream 1 Application(s) Topical two times a day  dexAMETHasone  IVPB 20 milliGRAM(s) IV Intermittent daily  dextrose 50% Injectable 25 Gram(s) IV Push once  dextrose 50% Injectable 12.5 Gram(s) IV Push once  dextrose 50% Injectable 25 Gram(s) IV Push once  esmolol  Infusion 49.626 MICROgram(s)/kG/Min (71.7 mL/Hr) IV Continuous <Continuous>  furosemide   Injectable 60 milliGRAM(s) IV Push once  glucagon  Injectable 1 milliGRAM(s) IntraMuscular once  HYDROmorphone Infusion. 1 mG/Hr (1 mL/Hr) IV Continuous <Continuous>  insulin lispro (ADMELOG) corrective regimen sliding scale   SubCutaneous every 6 hours  naloxegol 12.5 milliGRAM(s) Oral daily  norepinephrine Infusion 0.05 MICROgram(s)/kG/Min (11.2 mL/Hr) IV Continuous <Continuous>  pantoprazole  Injectable 40 milliGRAM(s) IV Push daily  petrolatum Ophthalmic Ointment 1 Application(s) Both EYES every 12 hours  piperacillin/tazobactam IVPB.. 3.375 Gram(s) IV Intermittent every 8 hours  polyethylene glycol 3350 17 Gram(s) Oral two times a day  propofol Infusion 50 MICROgram(s)/kG/Min (71.7 mL/Hr) IV Continuous <Continuous>  senna 2 Tablet(s) Oral at bedtime  sodium chloride 3%  Inhalation 4 milliLiter(s) Inhalation every 6 hours  vancomycin  IVPB      vancomycin  IVPB 1250 milliGRAM(s) IV Intermittent every 8 hours    MEDICATIONS  (PRN):    PRESENT SYMPTOMS: [X]Unable to self-report see CPOT, PAINADs, RDOS  Source if other than patient:  [ ]Family   [ ]Team     Pain: [ ]yes [ ]no  QOL impact -   Location -                    Aggravating factors -  Quality -  Radiation -  Timing-  Severity (0-10 scale):  Minimal acceptable level (0-10 scale):     PCSSQ [Palliative Care Spiritual Screening Question]   Severity (0-10):  Score of 4 or > indicate consideration of Chaplaincy referral.  Chaplaincy Referral: [ ] yes [ ] refused [ ] following    Caregiver Ransom? : [X] yes [ ] no [ ] deferred:  Social work referral [X] Patient & Family Centered Care Referral [ ]     Anticipatory Grief Present?: [X] yes [ ] no  [ ] deferred: Social work referral [X]  Patient & Family Centered Care Referral [ ]     SYMPTOMS: Unable to obtain due to medical acuity  Dyspnea:                           [ ]Mild [ ]Moderate [ ]Severe  Anxiety:                             [ ]Mild [ ]Moderate [ ]Severe  Fatigue:                             [ ]Mild [ ]Moderate [ ]Severe  Nausea/Vomiting:              [ ]Mild [ ]Moderate [ ]Severe  Loss of appetite:                [ ]Mild [ ]Moderate [ ]Severe  Constipation:                     [ ]Mild [ ]Moderate [ ]Severe    Other Symptoms:  [ ]All other review of systems negative     PHYSICAL EXAM:  Vital Signs Last 24 Hrs  T(C): 37.2 (26 Feb 2025 12:00), Max: 38 (25 Feb 2025 18:00)  T(F): 99 (26 Feb 2025 12:00), Max: 100.4 (25 Feb 2025 18:00)  HR: 83 (26 Feb 2025 13:00) (78 - 107)  BP: 97/54 (25 Feb 2025 18:30) (97/54 - 128/76)  BP(mean): 66 (25 Feb 2025 18:30) (66 - 90)  RR: 14 (26 Feb 2025 13:00) (13 - 26)  SpO2: 96% (26 Feb 2025 13:00) (80% - 97%)    Parameters below as of 26 Feb 2025 13:00  Patient On (Oxygen Delivery Method): ventilator    O2 Concentration (%): 100 I&O's Summary    25 Feb 2025 07:01  -  26 Feb 2025 07:00  --------------------------------------------------------  IN: 1954.5 mL / OUT: 2520 mL / NET: -565.5 mL    26 Feb 2025 07:01  -  26 Feb 2025 14:17  --------------------------------------------------------  IN: 1929.2 mL / OUT: 1975 mL / NET: -45.8 mL      GENERAL: [ ]Cachexia    [ ]Alert  [ ]Oriented x   [ ]Lethargic  [ ]Unarousable  [ ]Verbal  [X]Non-Verbal  Behavioral:   [ ] Anxiety  [ ] Delirium [ ] Agitation [X] Other Sedated  HEENT:  [ ]Normal   [ ]Dry mouth   [ ]ET Tube/Trach  [ ]Oral lesions  PULMONARY: - Intubated   [X]Clear [ ]Tachypnea  [ ]Audible excessive secretions   [ ]Rhonchi        [ ]Right [ ]Left [ ]Bilateral  [ ]Crackles        [ ]Right [ ]Left [ ]Bilateral  [ ]Wheezing     [ ]Right [ ]Left [ ]Bilateral  [ ]Diminished breath sounds [ ]right [ ]left [ ]bilateral  CARDIOVASCULAR:    [X]Regular [ ]Irregular [ ]Tachy  [ ]Parviz [ ]Murmur [ ]Other  GASTROINTESTINAL:  [X]Soft  [ ]Distended   [ ]+BS  [ ]Non tender [ ]Tender  [ ]Other [ ]PEG [ ]OGT/ NGT  Last BM: Unknown  GENITOURINARY:  [ ]Normal [ ] Incontinent   [ ]Oliguria/Anuria   [ ][place]  MUSCULOSKELETAL:   [ ]Normal   [ ]Weakness  [X]Bed/Wheelchair bound [ ]Edema  NEUROLOGIC:   [ ]No focal deficits  [ ]Cognitive impairment  [ ]Dysphagia [ ]Dysarthria [ ]Paresis [X]Other Sedated   SKIN:   [ ]Normal  [ ]Rash  [X]Other - Please see RN documentation which I have reviewed  [ ]Pressure ulcer(s)       Present on admission [ ]y [ ]n    CRITICAL CARE:  [ ] Shock Present  [ ]Septic [ ]Cardiogenic [ ]Neurologic [ ]Hypovolemic  [ ]  Vasopressors [ ]  Inotropes   [ ]Respiratory failure present [ ]Mechanical ventilation [ ]Non-invasive ventilatory support [ ]High flow  Mode: AC/ CMV (Assist Control/ Continuous Mandatory Ventilation), RR (machine): 14, FiO2: 100, PEEP: 16, ITime: 1, MAP: 19, PC: 12, PIP: 28  [ ]Acute  [ ]Chronic [ ]Hypoxic  [ ]Hypercarbic [ ]Other  [X]Other organ failure: Lung     LABS:                        11.2   20.49 )-----------( 144      ( 26 Feb 2025 11:15 )             37.8   02-26    138  |  99  |  24[H]  ----------------------------<  155[H]  4.1   |  29  |  1.48[H]    Ca    8.0[L]      26 Feb 2025 13:20  Phos  3.5     02-26  Mg     2.20     02-26    TPro  6.4  /  Alb  2.6[L]  /  TBili  1.0  /  DBili  x   /  AST  73[H]  /  ALT  60[H]  /  AlkPhos  52  02-26  PT/INR - ( 26 Feb 2025 05:30 )   PT: 23.4 sec;   INR: 1.98 ratio         PTT - ( 26 Feb 2025 10:00 )  PTT:52.0 sec    Urinalysis Basic - ( 26 Feb 2025 13:20 )    Color: x / Appearance: x / SG: x / pH: x  Gluc: 155 mg/dL / Ketone: x  / Bili: x / Urobili: x   Blood: x / Protein: x / Nitrite: x   Leuk Esterase: x / RBC: x / WBC x   Sq Epi: x / Non Sq Epi: x / Bacteria: x      RADIOLOGY & ADDITIONAL STUDIES:  < from: Xray Chest 1 View- PORTABLE-Urgent (Xray Chest 1 View- PORTABLE-Urgent .) (02.26.25 @ 07:42) >  IMPRESSION:  Follow-up with enteric tube in the stomach with endotracheal tube and   ECMO catheter as seen before.  Small right effusion with fleeting opacities in the lungs probably   atelectasis in this patient with ARDS.    < end of copied text >      PROTEIN CALORIE MALNUTRITION PRESENT: [ ]mild [ ]moderate [ ]severe [ ]underweight [ ]morbid obesity  https://www.andeal.org/vault/2440/web/files/ONC/Table_Clinical%20Characteristics%20to%20Document%20Malnutrition-White%20JV%20et%20al%362802.pdf    Height (cm): 170.2 (02-26-25 @ 01:48), 170.2 (02-25-25 @ 16:00), 177.8 (10-05-24 @ 01:35)  Weight (kg): 240.8 (02-26-25 @ 08:00), 239.2 (02-25-25 @ 20:00), 172.4 (10-05-24 @ 01:35)  BMI (kg/m2): 83.1 (02-26-25 @ 08:00), 82.6 (02-26-25 @ 01:48), 82.6 (02-25-25 @ 20:00)    [ ]PPSV2 < or = to 30% [ ]significant weight loss  [ ]poor nutritional intake  [ ]anasarca[ ]Artificial Nutrition      Other REFERRALS:  [ ]Hospice  [ ]Child Life  [ ]Social Work  [ ]Case management [ ]Holistic Therapy     Care Coordination Assessment 201 [C. Provider] (02-25-25 @ 12:02)      Palliative Performance Scale:  http://npcrc.org/files/news/palliative_performance_scale_ppsv2.pdf  (Ctrl +  left click to view)  Respiratory Distress Observation Tool:  https://homecareinformation.net/handouts/hen/Respiratory_Distress_Observation_Scale.pdf (Ctrl +  left click to view)  PAINAD Score:  http://geriatrictoolkit.[place].[facility]/cog/painad.pdf (Ctrl +  left click to view)

## 2025-02-26 NOTE — PROGRESS NOTE ADULT - ASSESSMENT
40 yo M w/ class III obesity, pAfib (no AC), HTN, BEA (on CPAP), history of b/l papilledema attributed to pseudotumor cerebri s/p LP in 2024 with very high opening pressure, who is transferred for VV ECMO for ARDS and severe hypoxia. Patient initially presented to [facility] on 2/24 for SOB and b/l LE edema. 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 He is a current every day smoker of [employer] and marijuana, but does not drink or do other drugs. Currently lives with his mother. There is a long term partner in his life, but they are not , and they have an on/off relationship currently not very involved with each other as per the patient's mother and aunt.  At [facility] where he was getting an eval last year for shortness of breath, he had a sleep study and was diagnosed with sleep apnea, prescribed a PAP machine, which he has in his room but the mother is not sure how many nights per week he uses it. Recently, the last day or two, his mother and brother have been under the weather with URIs and the patient 2 days ago started to develop a ruynny nose, cough, some wheezing of the chest, as well as worsening shortness of breath and fevers at home. He called his aunt who told him to go get checked out and then he landed at the [facility] ED. Patient found to be reportedly hypoxemic to 70% on RA with worsening respiratory status/secretions and somnolence in the ED. Patient was intubated with difficulty (7 attempts) due to very large tongue secretions. Despite optimal vent management, patient remained hypoxemic. Unable to prone due to obesity. Patient cannulated for VV ECMO on 2/25 and transferred to [facility] for further management.     [facility] labs notable for MRSA PCR -, Fluvid -, urine legionella -, sputum gram stain  with GPC and GPR    CTA chest on 2/24 negative for pulmonary embolism, notable for patchy bilateral lower lobe opacities, hepatomegaly, mild ascites, edema/induration of the abdominal pannus.    LE duplex on 2/25 negative for DVT    TTE on 2/25 showed LV EF 61%, enlarged RV with TAPSE 2.1cm, ePASP at least 49 (Patient had 10/2024 TTE with normal LV and RV with ePASP 14).    Only home med is Lasix. Not on acetazolamide for pseudotumor or on Wegovy (was pending auth)    Here for VV ECMO, pneumonia bacterial on viral leading to ARDS and volume overload probably in setting of undiagnosed acute on chronic pHTN group II/III    Plan  #Neuro  - history of pseudotumor cerebri s/p LP under fluoro in 2024 with opening pressure in the 50s  - was considered to start acetazolamide but never started  - MRI late 2024 also showed possible pituitary mass but unclear because of pressure effect from pseudotumor cerebri causing partially empty sella?  - will check prolactin, lower utility of checking cortisol axis given already received steroids but should be investigated, will check ACTH should be suppressed by steroids  - TSH normal but will check fT4 and T3 given c/f pituitary adenoma  - fent drip, prop drip, cisat drip    #Cardiovascular  - echo from 2024 did not demonstrate elevation of pulmonary pressures, but now with severe PH suggesting chronicity on TTE at   - ROBERT 2/26  - trend trop given elevated at   - EKG  - bot hypotensive here, requiring esmolol for high native CO to match circuit flows    - ECMO SETTINGS:  - current VV ECMO settings RPM Flow 4.5 L, sweep 8, FiO2 1.   - 20 cm RIJ  - 25 cm femoral catheter  - LVOT VTI 17 cm on ROBERT, LVOT diameter 2.4 cm. . CO 7.6 L/min. peripheral sats 83%, starting esmolol gtt to lower HR given high CO    #Pulmonary  - c/f acute on suspected chronic pulmonary hypertension in setting of possible BEA/OHS  - ARDS due to pneumonia possible  - possible pulmonary edema as well  - RVP and BAL cultures  - bronchoscopy demonstrated copious purulent secretions 2/26  - CT chest otherwise did not reveal PE or significant parenchymal abnormalities apart from infiltrates, atelectasis  - 20 mg dexamethasone x 5 days, then 10 mg and taper as per clinical status pneumonia/ARDS  - f/u BAL  - DuoNeb q6 hours + 3% saline inhalational  - smoker  - history of diagnosed BEA/?OHS intermittent compliance to home PAP  - driving pressure 2/26 was ~14, plateau on 6 cc/kg (400 ml) was 32 peep 16  - currently on pressure control while on ecmo  - difficult glottic visualization due to large tongue upon intubation 2/25    #Renal  - ELLA - improving, likely ATN/vascular congestion  - UOP more brisk after ECMO cannulation    #Infectious disease  - Linezolid, Zosyn 2/26 -   - was on cefepime 2/25  - was on aztihro 2/25  - full RVP  - BCx x 2 2/24, UCx 2/25, tracheal aspirate Cx from 2/25 showing some organisms pending speciation but included GPC in pairs  - urine legionella neg, culture sent at   - step pneumo Ag sent at [facility]  - repeat cultures at [facility]  - BAL cultures 2/26  - possible penile meatus yellow discharge check urine GC/chamydia, check HIV  - candidal intertrigo +/- cellulitis of the pannus - clotrimazole cream 1% BID 2/26 -+ abx as per above    #Hematology/Oncology  - argatroban  - trend coags  - mild anemia Hb 11s on admission down from 13 baseline - check iron studies, retic, LDH, hapto  - give 1 u PRBC 2/26 for O2 delivery  - LE duplex 2/25 neg    #Gastrointestinal  - feeds, bowel regimen  - denied diarrhea, n/v as per family  - hepatomegaly and mild ascites noted on CT A/P - no pocket to tap  - check liver sono - a/f cirrhosis - probably fatty liver  - senna 2/26 -   - Miralax 2/26 -     #Endocrine  - obesity with metabolic syndrome  - diabetes with steroid induced hypoglycemia  - pituitary workup, steroids as above given recent weight gain but likely volume though  - ISS    #FEN  - nutrition consult    #PPx  - argatroban  - full code as per mother  - L subclavian TLC inserted 2/25   - L radial artery line 2/25  - RIJ ECMO cannulat 2/26 at 20 cm  - R fem ECMO cannula 2/26 at 25 cm 40 yo M w/ class III obesity, pAfib (no AC), HTN, BEA (on CPAP), current smoker history of b/l papilledema attributed to pseudotumor cerebri s/p LP in 2024 with very high opening pressure, who is transferred for VV ECMO for ARDS and severe hypoxia iso multifocal pna. Cannulated on 2/25 and transferred to [facility] for further management.    [facility] labs notable for MRSA PCR -, Fluvid -, urine legionella -, sputum gram stain  with GPC and GPR        LE duplex on 2/25 negative for DVT        Plan  #Neuro  - history of pseudotumor cerebri s/p LP under fluoro in 2024 with opening pressure in the 50s  - was considered to start acetazolamide but never started  - MRI late 2024 also showed possible pituitary mass but unclear because of pressure effect from pseudotumor cerebri causing partially empty sella?  - will check prolactin, lower utility of checking cortisol axis given already received steroids but should be investigated, will check ACTH should be suppressed by steroids  - TSH normal but will check fT4 and T3 given c/f pituitary adenoma  - dilaudid drip, prop drip, cisat drip - will keep paralyzed today, RASS goal -4  - check TGs    #Cardiovascular  - echo from 2024 did not demonstrate elevation of pulmonary pressures, but now with severe PH suggesting chronicity on TTE at    - ROBERT 2/26 showing VTI 17  - follow up repeatTTE; TTE on 2/25 showed LV EF 61%, enlarged RV with TAPSE 2.1cm, ePASP at least 49 (Patient had 10/2024 TTE with normal LV and RV with ePASP 14).  - troponins initially elevated however downtrended   - started on esmolol drip for high flows with HR now in 90s    - ECMO:   Flow: 4.45 	          P1:       142           Delta P: 31  RPM: 	3160	  P2:    174              SVO2: 81.5    Oxygenator:	Sweep:  8   L/min	FiO2: 1.0  - 20 cm RIJ  - 25 cm femoral catheter.   - q1 hour sweeps  - q6 hour blood gas for patient  - q12 hour pre + post oxygenator gases     #Pulmonary  - c/f acute on suspected chronic pulmonary hypertension in setting of possible BEA/OHS c/b pneumonia, ARDS, and pulmonary edema   - CTA chest on 2/24 negative for pulmonary embolism, notable for patchy bilateral lower lobe opacities, hepatomegaly, mild ascites, edema/induration of the abdominal pannus.  - RVP negative  - follow up BAL cultures   - bronchoscopy demonstrated copious purulent secretions 2/26  - CT chest otherwise did not reveal PE or significant parenchymal abnormalities apart from infiltrates, atelectasis  - 20 mg dexamethasone x 5 days, then 10 mg and taper as per clinical status pneumonia/ARDS  - DuoNeb q6 hours + 3% saline inhalational  - history of diagnosed BEA/?OHS intermittent compliance to home PAP  - driving pressure 2/26 was ~14, plateau on 6 cc/kg (400 ml) was 32 peep 16  - currently on pressure control while on ecmo  - difficult glottic visualization due to large tongue upon intubation 2/25    #Renal  - ELLA - improving, likely ATN/vascular congestion  - UOP more brisk after ECMO cannulation  - monitor I?O     #Infectious disease  - Linezolid, Zosyn 2/26 -   - was on cefepime 2/25  - was on aztihro 2/25  - full RVP negative  - BCx x 2 2/24, UCx 2/25, tracheal aspirate Cx from 2/25 showing some organisms pending speciation but included GPC in pairs  - urine legionella neg, culture sent at   - step pneumo Ag sent at [facility]  - repeat cultures at [facility]  - BAL cultures 2/26  - possible penile meatus yellow discharge check urine GC/chamydia, check HIV  - candidal intertrigo +/- cellulitis of the pannus - clotrimazole cream 1% BID 2/26 -+ abx as per above    #Hematology/Oncology  - argatroban  - trend coags  - mild anemia Hb 11s on admission down from 13 baseline - check iron studies, retic, LDH, hapto  - give 1 u PRBC 2/26 for O2 delivery  - LE duplex 2/25 neg    #Gastrointestinal  - feeds, bowel regimen  - denied diarrhea, n/v as per family  - hepatomegaly and mild ascites noted on CT A/P - no pocket to tap  - check liver sono - a/f cirrhosis - probably fatty liver  - senna 2/26 -   - Miralax 2/26 -    -check TGs    #Endocrine  - obesity with metabolic syndrome  - diabetes with steroid induced hypoglycemia  - pituitary workup, steroids as above given recent weight gain but likely volume though  - ISS    #FEN  - nutrition consult    #PPx  - argatroban  - full code as per mother  - L subclavian TLC inserted 2/25   - L radial artery line 2/25  - RIJ ECMO cannulat 2/26 at 20 cm  - R fem ECMO cannula 2/26 at 25 cm 40 yo M w/ class III obesity, pAfib (no AC), HTN, BEA (on CPAP), current smoker history of b/l papilledema attributed to pseudotumor cerebri s/p LP in 2024 with very high opening pressure, who is transferred for VV ECMO for ARDS and severe hypoxia iso multifocal pna. Cannulated on 2/25 and transferred to [facility] for further management.    [facility] labs notable for MRSA PCR -, Fluvid -, urine legionella -, sputum gram stain  with GPC and GPR        LE duplex on 2/25 negative for DVT        Plan  #Neuro  - history of pseudotumor cerebri s/p LP under fluoro in 2024 with opening pressure in the 50s  - was considered to start acetazolamide but never started  - MRI late 2024 also showed possible pituitary mass but unclear because of pressure effect from pseudotumor cerebri causing partially empty sella?  - will check prolactin, lower utility of checking cortisol axis given already received steroids but should be investigated, will check ACTH should be suppressed by steroids  - TSH normal but will check fT4 and T3 given c/f pituitary adenoma  - dilaudid drip, prop drip, cisat drip - will keep paralyzed today, RASS goal -4  - check TGs    #Cardiovascular  - echo from 2024 did not demonstrate elevation of pulmonary pressures, but now with severe PH suggesting chronicity on TTE at    - ROBERT 2/26 showing VTI 17  - follow up repeatTTE; TTE on 2/25 showed LV EF 61%, enlarged RV with TAPSE 2.1cm, ePASP at least 49 (Patient had 10/2024 TTE with normal LV and RV with ePASP 14).  - troponins initially elevated however downtrended   - started on esmolol drip for high flows with HR now in 90s    - ECMO:   Flow: 4.45 	          P1:       142           Delta P: 31  RPM: 	3160	  P2:    174              SVO2: 81.5    Oxygenator:	Sweep:  8   L/min	FiO2: 1.0  - 20 cm RIJ  - 25 cm femoral catheter.   - q1 hour sweeps  - q6 hour blood gas for patient  - q12 hour pre + post oxygenator gases     #Pulmonary  - c/f acute on suspected chronic pulmonary hypertension in setting of possible BEA/OHS c/b pneumonia, ARDS, and pulmonary edema   - CTA chest on 2/24 negative for pulmonary embolism, notable for patchy bilateral lower lobe opacities, hepatomegaly, mild ascites, edema/induration of the abdominal pannus.  - RVP negative  - follow up BAL cultures   - bronchoscopy demonstrated copious purulent secretions 2/26  - CT chest otherwise did not reveal PE or significant parenchymal abnormalities apart from infiltrates, atelectasis  - 20 mg dexamethasone x 5 days, then 10 mg and taper as per clinical status pneumonia/ARDS  - DuoNeb q6 hours + 3% saline inhalational  - history of diagnosed BEA/?OHS intermittent compliance to home PAP  - driving pressure 2/26 was ~14, plateau on 6 cc/kg (400 ml) was 32 peep 16  - currently on pressure control while on ecmo  - difficult glottic visualization due to large tongue upon intubation 2/25    #Renal  - ELLA - improving, likely ATN/vascular congestion  - UOP more brisk after ECMO cannulation  - monitor I?O     #Infectious disease  - Linezolid, Zosyn 2/26 -   - dc linezolid and transition to vanco 2/.26  - was on cefepime 2/25  - was on aztihro 2/25  - full RVP negative  - BCx x 2 2/24, UCx 2/25, tracheal aspirate Cx from 2/25 showing some organisms pending speciation but included GPC in pairs  - urine legionella neg, culture sent at   - step pneumo Ag sent at [facility]  - repeat cultures at [facility]  - BAL cultures 2/26  - possible penile meatus yellow discharge check urine GC/chamydia, check HIV  - candidal intertrigo +/- cellulitis of the pannus - clotrimazole cream 1% BID 2/26 -+ abx as per above    #Hematology/Oncology  - argatroban  - trend coags  - mild anemia Hb 11s on admission down from 13 baseline - check iron studies, retic, LDH, hapto  - give 1 u PRBC 2/26 for O2 delivery  - LE duplex 2/25 neg    #Gastrointestinal  - feeds, bowel regimen  - denied diarrhea, n/v as per family  - hepatomegaly and mild ascites noted on CT A/P - no pocket to tap  - check liver sono - a/f cirrhosis - probably fatty liver  - senna 2/26 -   - Miralax 2/26 -    -check TGs    #Endocrine  - obesity with metabolic syndrome  - diabetes with steroid induced hypoglycemia  - pituitary workup, steroids as above given recent weight gain but likely volume though  - ISS    #FEN  - nutrition consult    #PPx  - argatroban  - full code as per mother  - L subclavian TLC inserted 2/25   - L radial artery line 2/25  - RIJ ECMO cannulat 2/26 at 20 cm  - R fem ECMO cannula 2/26 at 25 cm

## 2025-02-26 NOTE — OCCUPATIONAL THERAPY INITIAL EVALUATION ADULT - NSOTDISCHREC_GEN_A_CORE
TBD pending patient's medical/functional progress. OT to continue to follow at this time for ROM/positioning. OT to continue to follow.

## 2025-02-26 NOTE — H&P ADULT - ATTENDING COMMENTS
Patient is a 38 yo M w/ class III obesity, pAfib (no AC), HTN, history of b/l pappiledema attributed to pseudotumor cerebri, who is transferred for VV ECMO for ARDS. Patient initially presented to [place] on 2/24 for SOB and b/l LE edema. As per chart review, patient endorses some degree of SOB and b/l LE edema x 3 months with significant weight gain. As per ICU team, patient had positive sick contacts with flu 1 to 2 weeks PTA and since then has had worsening SOB. Patient found to be reportedly hypoxemic to 70% on RA with worsening respiratory status/secretions and somnolence in the ED. Patient was intubated with difficulty (7 attempts). Despite optimal vent management, patient remained hypoxemic. Unable to prone due to obesity. Patient cannulated for VV ECMO on 2/25 and transferred to [facility] for further management.     [place] labs notable for MRSA PCR -, Fluvid -, urine legionella -, sputum gram stain  with GPC and GPR    CTA chest on 2/24 negative for pulmonary embolism, notable for patchy bilateral lower lobe opacities, hepatomegaly, mild ascites, edema/induration of the abdominal pannus.    LE duplex on 2/25 negative for DVT    TTE on 2/25 showed LV EF 61%, enlarged RV with TAPSE 2.1cm, ePASP at least 49 (Patient had 10/2024 TTE with normal LV and RV with ePASP 14).    #ARDS  #VV ECMO  #Multifocal Pneumonia  #Shock  #pHTN  #ELLA - Baseline creatinine 1  #Class III Obesity  #pAfib  #Acute hypoxemic respiratory failure  #Likely chronic hypercapnic respiratory failure  - c/w sedation and paralysis for now  - Current VV ECMO settings sweep 8, FiO2 100%  - Monitor blood gases  - Sigh q1h  - c/w mechanical ventilatory support on rest settings PC RR 14, PS 14, PEEP 16, currently getting TV of about 300  - c/w Duonebs q6h ATC  - Emergency vent settings VC RR 26, , PEEP 17, FiO2 100%  - c/w empiric Linezolid and Zosyn for now  - f/u BAL cultures and studies  - f/u blood cultures  - Monitor BMP and UOP. Diuresis for net negative  - Will order Decadron 20mg daily x 5 days then 10mg daily x 5 days as per DEXA ARDS trial    #DVT ppx - Argatroban gtt    #GOC - Full code. Mother is health care surrogate    #POCUS - See POCUS note    Jae Rousseau MD  Pulmonary & Critical Care    Total time spent on VV ECMO management was 30 minutes, separate from time spent on critical care management, procedures, and teaching. Patient is a 40 yo M w/ class III obesity, pAfib (no AC), HTN, history of b/l pappiledema attributed to pseudotumor cerebri, who is transferred for VV ECMO for ARDS. Patient initially presented to [place] on 2/24 for SOB and b/l LE edema. As per chart review, patient endorses some degree of SOB and b/l LE edema x 3 months with significant weight gain. As per ICU team, patient had positive sick contacts with flu 1 to 2 weeks PTA and since then has had worsening SOB. Patient found to be reportedly hypoxemic to 70% on RA with worsening respiratory status/secretions and somnolence in the ED. Patient was intubated with difficulty (7 attempts). Despite optimal vent management, patient remained hypoxemic. Unable to prone due to obesity. Patient cannulated for VV ECMO on 2/25 and transferred to [facility] for further management.     [place] labs notable for MRSA PCR -, Fluvid -, urine legionella -, sputum gram stain  with GPC and GPR    CTA chest on 2/24 negative for pulmonary embolism, notable for patchy bilateral lower lobe opacities, hepatomegaly, mild ascites, edema/induration of the abdominal pannus.    LE duplex on 2/25 negative for DVT    TTE on 2/25 showed LV EF 61%, enlarged RV with TAPSE 2.1cm, ePASP at least 49 (Patient had 10/2024 TTE with normal LV and RV with ePASP 14).    #ARDS  #VV ECMO  #Multifocal Pneumonia  #Shock  #pHTN  #ELLA - Baseline creatinine 1  #Class III Obesity  #pAfib  #Acute hypoxemic respiratory failure  #Likely chronic hypercapnic respiratory failure  - c/w sedation and paralysis for now  - Current VV ECMO settings sweep 8, FiO2 100%  - Monitor blood gases  - Sigh q1h  - c/w mechanical ventilatory support on rest settings PC RR 14, PS 14, PEEP 16, currently getting TV of about 300  - c/w Duonebs q6h ATC  - Emergency vent settings VC RR 26, , PEEP 17, FiO2 100%  - c/w empiric Linezolid and Zosyn for now  - Check full RVP  - f/u BAL cultures and studies  - f/u blood cultures  - Monitor BMP and UOP. Diuresis for net negative  - Will order Decadron 20mg daily x 5 days then 10mg daily x 5 days as per DEXA ARDS trial    #DVT ppx - Argatroban gtt    #GOC - Full code. Mother is health care surrogate    #POCUS - See POCUS note    Jae Rousseau MD  Pulmonary & Critical Care    Total time spent on VV ECMO management was 30 minutes, separate from time spent on critical care management, procedures, and teaching. Patient is a 40 yo M w/ class III obesity, pAfib (no AC), HTN, history of b/l pappiledema attributed to pseudotumor cerebri, who is transferred for VV ECMO for ARDS. Patient initially presented to [place] on 2/24 for SOB and b/l LE edema. As per chart review, patient endorses some degree of SOB and b/l LE edema x 3 months with significant weight gain. As per ICU team, patient had positive sick contacts with flu 1 to 2 weeks PTA and since then has had worsening SOB. Patient found to be reportedly hypoxemic to 70% on RA with worsening respiratory status/secretions and somnolence in the ED. Patient was intubated with difficulty (7 attempts). Despite optimal vent management, patient remained hypoxemic. Unable to prone due to obesity. Patient cannulated for VV ECMO on 2/25 and transferred to [facility] for further management.     [place] labs notable for MRSA PCR -, Fluvid -, urine legionella -, sputum gram stain  with GPC and GPR    CTA chest on 2/24 negative for pulmonary embolism, notable for patchy bilateral lower lobe opacities, hepatomegaly, mild ascites, edema/induration of the abdominal pannus.    LE duplex on 2/25 negative for DVT    TTE on 2/25 showed LV EF 61%, enlarged RV with TAPSE 2.1cm, ePASP at least 49 (Patient had 10/2024 TTE with normal LV and RV with ePASP 14).    #ARDS  #VV ECMO  #Multifocal Pneumonia  #Shock  #pHTN  #ELLA - Baseline creatinine 1  #Class III Obesity  #pAfib  #Acute hypoxemic respiratory failure  #Likely chronic hypercapnic respiratory failure  - c/w sedation and paralysis for now  - Current VV ECMO settings RPM 2855, Flow 3.55 L, sweep 8, FiO2 100%  - Monitor blood gases  - Sigh q1h  - c/w mechanical ventilatory support on rest settings PC RR 14, PS 14, PEEP 16, currently getting TV of about 300  - c/w Duonebs q6h ATC  - Emergency vent settings VC RR 26, , PEEP 17, FiO2 100%  - c/w empiric Linezolid and Zosyn for now  - Check full RVP  - f/u BAL cultures and studies  - f/u blood cultures  - Monitor BMP and UOP. Diuresis for net negative  - Will order Decadron 20mg daily x 5 days then 10mg daily x 5 days as per DEXA ARDS trial    #DVT ppx - Argatroban gtt    #GOC - Full code. Mother is health care surrogate    #POCUS - See POCUS note    Jae Rousseau MD  Pulmonary & Critical Care    Total time spent on VV ECMO management was 30 minutes, separate from time spent on critical care management, procedures, and teaching. Patient is a 40 yo M w/ class III obesity, pAfib (no AC), HTN, BEA (on CPAP), history of b/l pappiledema attributed to pseudotumor cerebri, who is transferred for VV ECMO for ARDS. Patient initially presented to [place] on 2/24 for SOB and b/l LE edema. As per chart review, patient endorses some degree of SOB and b/l LE edema x 3 months with significant weight gain. As per ICU team, patient had positive sick contacts with viral URI 1 to 2 weeks PTA and since then has had worsening SOB. Patient found to be reportedly hypoxemic to 70% on RA with worsening respiratory status/secretions and somnolence in the ED. Patient was intubated with difficulty (7 attempts). Despite optimal vent management, patient remained hypoxemic. Unable to prone due to obesity. Patient cannulated for VV ECMO on 2/25 and transferred to [facility] for further management.     [place] labs notable for MRSA PCR -, Fluvid -, urine legionella -, sputum gram stain  with GPC and GPR    CTA chest on 2/24 negative for pulmonary embolism, notable for patchy bilateral lower lobe opacities, hepatomegaly, mild ascites, edema/induration of the abdominal pannus.    LE duplex on 2/25 negative for DVT    TTE on 2/25 showed LV EF 61%, enlarged RV with TAPSE 2.1cm, ePASP at least 49 (Patient had 10/2024 TTE with normal LV and RV with ePASP 14).    #ARDS  #VV ECMO  #Multifocal Pneumonia  #Shock  #pHTN  #ELLA - Baseline creatinine 1  #Class III Obesity  #pAfib  #Acute hypoxemic respiratory failure  #Likely chronic hypercapnic respiratory failure  - c/w sedation and paralysis for now  - Current VV ECMO settings RPM 2855, Flow 3.55 L, sweep 8, FiO2 100%  - Monitor blood gases  - Sigh q1h  - c/w mechanical ventilatory support on rest settings PC RR 14, PS 14, PEEP 16, currently getting TV of about 300  - c/w Duonebs q6h ATC  - Emergency vent settings VC RR 26, , PEEP 17, FiO2 100%  - c/w empiric Linezolid and Zosyn for now  - Check full RVP  - f/u BAL cultures and studies  - f/u blood cultures  - Monitor BMP and UOP. Diuresis for net negative  - Will order Decadron 20mg daily x 5 days then 10mg daily x 5 days as per DEXA ARDS trial  - WIll need to obtain collateral from [facility]    #DVT ppx - Argatroban gtt    #GOC - Full code. Mother is health care surrogate    #POCUS - See POCUS note    Jae Rousseau MD  Pulmonary & Critical Care    Total time spent on VV ECMO management was 30 minutes, separate from time spent on critical care management, procedures, and teaching. Patient is a 38 yo M w/ class III obesity, pAfib (no AC), HTN, BEA (on CPAP), history of b/l pappiledema attributed to pseudotumor cerebri, who is transferred for VV ECMO for ARDS. Patient initially presented to [place] on 2/24 for SOB and b/l LE edema. As per chart review, patient endorses some degree of SOB and b/l LE edema x 3 months with significant weight gain. As per ICU team, patient had positive sick contacts with viral URI 1 to 2 weeks PTA and since then has had worsening SOB. Patient found to be reportedly hypoxemic to 70% on RA with worsening respiratory status/secretions and somnolence in the ED. Patient was intubated with difficulty (7 attempts). Despite optimal vent management, patient remained hypoxemic. Unable to prone due to obesity. Patient cannulated for VV ECMO on 2/25 and transferred to [facility] for further management.     [place] labs notable for MRSA PCR -, Fluvid -, urine legionella -, sputum gram stain  with GPC and GPR    CTA chest on 2/24 negative for pulmonary embolism, notable for patchy bilateral lower lobe opacities, hepatomegaly, mild ascites, edema/induration of the abdominal pannus.    LE duplex on 2/25 negative for DVT    TTE on 2/25 showed LV EF 61%, enlarged RV with TAPSE 2.1cm, ePASP at least 49 (Patient had 10/2024 TTE with normal LV and RV with ePASP 14).    #ARDS  #VV ECMO  #Multifocal Pneumonia  #Shock  #pHTN  #ELLA - Baseline creatinine 1  #Class III Obesity  #pAfib  #Acute hypoxemic respiratory failure  #Likely chronic hypercapnic respiratory failure  - c/w sedation and paralysis for now  - Current VV ECMO settings RPM Flow 4.5 L, sweep 8, FiO2 100%. LVOT VTI 17 cm on ROBERT, LVOT diameter 2.4 cm. . CO 7.6 L. peripheral sats 83%, starting esmolol gtt  - Monitor blood gases  - Sigh q1h  - c/w mechanical ventilatory support on rest settings PC RR 14, PS 14, PEEP 16, currently getting TV of about 300  - c/w Duonebs q6h ATC  - Emergency vent settings VC RR 26, , PEEP 17, FiO2 100%  - c/w empiric Linezolid and Zosyn for now  - Check full RVP  - f/u BAL cultures and studies  - f/u blood cultures  - Monitor BMP and UOP. Diuresis for net negative  - Will order Decadron 20mg daily x 5 days then 10mg daily x 5 days as per DEXA ARDS trial  - WIll need to obtain collateral from [facility]    #DVT ppx - Argatroban gtt    #GOC - Full code. Mother is health care surrogate    #POCUS - See POCUS note    Jae Rousseau MD  Pulmonary & Critical Care    Total time spent on VV ECMO management was 30 minutes, separate from time spent on critical care management, procedures, and teaching.

## 2025-02-26 NOTE — DIETITIAN INITIAL EVALUATION ADULT - PERTINENT LABORATORY DATA
02-26    137  |  99  |  24[H]  ----------------------------<  154[H]  4.2   |  28  |  1.51[H]    Ca    7.9[L]      26 Feb 2025 11:15  Phos  3.5     02-26  Mg     2.20     02-26    TPro  6.4  /  Alb  2.5[L]  /  TBili  1.0  /  DBili  x   /  AST  76[H]  /  ALT  61[H]  /  AlkPhos  52  02-26  POCT Blood Glucose.: 159 mg/dL (02-26-25 @ 11:21)  A1C with Estimated Average Glucose Result: 6.7 % (02-25-25 @ 05:44)

## 2025-02-26 NOTE — H&P ADULT - HISTORY OF PRESENT ILLNESS
38 yo M w/ class III obesity, pAfib (no AC), HTN, BEA (on CPAP), history of b/l papilledema attributed to pseudotumor cerebri s/p LP in 2024 with very high opening pressure, who is transferred for VV ECMO for ARDS and severe hypoxia. Patient initially presented to [place] on 2/24 for SOB and b/l LE edema. 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 He is a current every day smoker of [employer] and marijuana, but does not drink or do other drugs. Currently lives with his mother. There is a long term partner in his life, but they are not , and they have an on/off relationship currently not very involved with each other as per the patient's mother and aunt.  At [facility] where he was getting an eval last year for shortness of breath, he had a sleep study and was diagnosed with sleep apnea, prescribed a PAP machine, which he has in his room but the mother is not sure how many nights per week he uses it. Recently, the last day or two, his mother and brother have been under the weather with URIs and the patient 2 days ago started to develop a ruynny nose, ough, some wheezing of the chest, as well as worsening shortness of breath and fevers at home. He called his aunt who told him to go get checked out and then he landed at the [place] ED. Patient found to be reportedly hypoxemic to 70% on RA with worsening respiratory status/secretions and somnolence in the ED. Patient was intubated with difficulty (7 attempts) due to very large tongue secretions. Despite optimal vent management, patient remained hypoxemic. Unable to prone due to obesity. Patient cannulated for VV ECMO on 2/25 and transferred to [facility] for further management.     [place] labs notable for MRSA PCR -, Fluvid -, urine legionella -, sputum gram stain  with GPC and GPR    CTA chest on 2/24 negative for pulmonary embolism, notable for patchy bilateral lower lobe opacities, hepatomegaly, mild ascites, edema/induration of the abdominal pannus.    LE duplex on 2/25 negative for DVT    TTE on 2/25 showed LV EF 61%, enlarged RV with TAPSE 2.1cm, ePASP at least 49 (Patient had 10/2024 TTE with normal LV and RV with ePASP 14).    Only home med is Lasix. Not on acetazolamide for pseudotumor or on Wegovy (was pending auth)

## 2025-02-26 NOTE — PATIENT PROFILE ADULT - FALL HARM RISK - RISK INTERVENTIONS

## 2025-02-26 NOTE — DIETITIAN INITIAL EVALUATION ADULT - PERTINENT MEDS FT
MEDICATIONS  (STANDING):  albuterol/ipratropium for Nebulization 3 milliLiter(s) Nebulizer every 6 hours  argatroban Infusion 0.7 MICROgram(s)/kG/Min (10 mL/Hr) IV Continuous <Continuous>  chlorhexidine 0.12% Liquid 15 milliLiter(s) Oral Mucosa every 12 hours  chlorhexidine 2% Cloths 1 Application(s) Topical <User Schedule>  cisatracurium Infusion 2 MICROgram(s)/kG/Min (28.7 mL/Hr) IV Continuous <Continuous>  clotrimazole 1% Cream 1 Application(s) Topical two times a day  dexAMETHasone  IVPB 20 milliGRAM(s) IV Intermittent daily  dextrose 50% Injectable 25 Gram(s) IV Push once  dextrose 50% Injectable 12.5 Gram(s) IV Push once  dextrose 50% Injectable 25 Gram(s) IV Push once  esmolol  Infusion 49.626 MICROgram(s)/kG/Min (71.7 mL/Hr) IV Continuous <Continuous>  glucagon  Injectable 1 milliGRAM(s) IntraMuscular once  HYDROmorphone Infusion. 1 mG/Hr (1 mL/Hr) IV Continuous <Continuous>  insulin lispro (ADMELOG) corrective regimen sliding scale   SubCutaneous every 6 hours  naloxegol 12.5 milliGRAM(s) Oral daily  norepinephrine Infusion 0.05 MICROgram(s)/kG/Min (11.2 mL/Hr) IV Continuous <Continuous>  pantoprazole  Injectable 40 milliGRAM(s) IV Push daily  petrolatum Ophthalmic Ointment 1 Application(s) Both EYES every 12 hours  piperacillin/tazobactam IVPB.. 4.5 Gram(s) IV Intermittent every 12 hours  polyethylene glycol 3350 17 Gram(s) Oral two times a day  propofol Infusion 50 MICROgram(s)/kG/Min (71.7 mL/Hr) IV Continuous <Continuous>  senna 2 Tablet(s) Oral at bedtime  sodium chloride 3%  Inhalation 4 milliLiter(s) Inhalation every 6 hours  vancomycin  IVPB

## 2025-02-26 NOTE — PROGRESS NOTE ADULT - CRITICAL CARE ATTENDING COMMENT
39 year old male with Class 3 obesity, pAF, HTN, BEA, and history of papilledema attributed to pseudotumor cerebri transferred here from [facility] 2/24/25 for VV-ECMO. He presented with acute hypoxemic respiratory failure and septic shock due to suspected multifocal pneumonia. There is a component of volume overload and right ventricular dysfunction as well. He was intubated 2/25/25 for development of severe ARDS.    NEURO: Sedated with propofol and hydromorphone. Attempt to wean off cisatracurium.  CVS: Not requiring vasopressors. Esmolol to improve RV dilated on TTE. Diuresis today with target net negative 1-2 L over next day.  PULM: VVECMO Support: FiO2 1, Sweep 8, Flow 4.45. Ventilator settings: Pressure AC Pinsp 12 PEEP 16 Rate 12, FiO2 100. TV approximately 350 cc.  GI: NPO. Continue enteral feeds. Follow up with nutrition to optimize. Bowel regimen.  RENAL: Acute kidney injury. Creatinine improving. Plan to diurese as above.  ENDO: RAISS  ID: Empiric vancomycin and zosyn. Follow up culture results.  HEME: Transfused 1 unit overnight 2/26 to optimize oxygenation. No evidence of bleeding.  PPX: Argatroban while on ECMO.  Full Code  Prognosis guarded

## 2025-02-26 NOTE — PHYSICAL THERAPY INITIAL EVALUATION ADULT - PASSIVE RANGE OF MOTION EXAMINATION, REHAB EVAL
Right side not assessed due to ECMO precautions/Left UE Passive ROM was WFL (within functional limits)/Left LE Passive ROM was WFL (within functional limits)

## 2025-02-26 NOTE — PROGRESS NOTE ADULT - SUBJECTIVE AND OBJECTIVE BOX
ECMO Day# 1    Vital Signs Last 24 Hrs  T(C): 37.2 (26 Feb 2025 08:00), Max: 38 (25 Feb 2025 18:00)  T(F): 99 (26 Feb 2025 08:00), Max: 100.4 (25 Feb 2025 18:00)  HR: 89 (26 Feb 2025 09:00) (78 - 107)  BP: 97/54 (25 Feb 2025 18:30) (97/54 - 128/76)  BP(mean): 66 (25 Feb 2025 18:30) (66 - 90)  RR: 14 (26 Feb 2025 09:00) (13 - 26)  SpO2: 91% (26 Feb 2025 09:00) (80% - 97%)      Adult Advanced Hemodynamics Last 24 Hrs  CVP(mm Hg): --  CVP(cm H2O): --  CO: --  CI: --  PA: --  PA(mean): --  PCWP: --  SVR: --  SVRI: --  PVR: --  PVRI: --    I&O's Summary    25 Feb 2025 07:01  -  26 Feb 2025 07:00  --------------------------------------------------------  IN: 1954.5 mL / OUT: 2520 mL / NET: -565.5 mL    26 Feb 2025 07:01  -  26 Feb 2025 09:01  --------------------------------------------------------  IN: 275.6 mL / OUT: 825 mL / NET: -549.4 mL          ECMO SETTINGS:  Type:		[ ] Venovenous		[ ] Venoarterial  Cannulation Site(s):     Flow:  	          P1:                  Delta P:  RPM: 		  P2:                  SVO2:    Oxygenator:	Sweep:     L/min	FiO2:     ABG - ( 26 Feb 2025 04:25 )  pH: 7.48  /  pCO2: 44    /  pO2: 64    / HCO3: 33    / Base Excess: 8.3   /  SaO2: 90.7                  VENTILATOR SETTINGS:     Mode: AC/ CMV (Assist Control/ Continuous Mandatory Ventilation)  RR (machine): 14  FiO2: 100  PEEP: 16  ITime: 1  MAP: 20  PC: 16  PIP: 32        LABS:                          11.2   17.61 )-----------( 121      ( 26 Feb 2025 02:20 )             39.4                          02-26    136  |  95[L]  |  23  ----------------------------<  149[H]  4.3   |  27  |  1.53[H]    Ca    8.1[L]      26 Feb 2025 02:20  Phos  3.0     02-26  Mg     2.10     02-26    TPro  6.7  /  Alb  2.7[L]  /  TBili  1.0  /  DBili  x   /  AST  78[H]  /  ALT  76[H]  /  AlkPhos  59  02-26      LIVER FUNCTIONS - ( 26 Feb 2025 02:20 )  Alb: 2.7 g/dL / Pro: 6.7 g/dL / ALK PHOS: 59 U/L / ALT: 76 U/L / AST: 78 U/L / GGT: x             PT/INR - ( 26 Feb 2025 05:30 )   PT: 23.4 sec;   INR: 1.98 ratio         PTT - ( 26 Feb 2025 08:03 )  PTT:53.7 sec      Culture - Sputum (collected 25 Feb 2025 12:20)  Source: .Sputum Sputum  Gram Stain (25 Feb 2025 20:54):    Moderate polymorphonuclear leukocytes per low power field    No Squamous epithelial cells per low power field    Rare Gram Positive Cocci in Pairs and Chains per oil power field    Rare Gram Positive Rods per oil power field    Culture - Urine (collected 25 Feb 2025 06:30)  Source: Catheterized Catheterized  Final Report (26 Feb 2025 08:38):    <10,000 CFU/mL Normal Urogenital Keysha    Urinalysis with Rflx Culture (collected 24 Feb 2025 21:49)    Culture - Blood (collected 24 Feb 2025 18:19)  Source: .Blood Blood-Peripheral  Preliminary Report (26 Feb 2025 01:03):    No growth at 24 hours    Culture - Blood (collected 24 Feb 2025 18:19)  Source: .Blood Blood-Peripheral  Preliminary Report (26 Feb 2025 01:03):    No growth at 24 hours          ACTIVE MEDS:    MEDICATIONS  (STANDING):  albuterol/ipratropium for Nebulization 3 milliLiter(s) Nebulizer every 6 hours  argatroban Infusion 0.7 MICROgram(s)/kG/Min (10 mL/Hr) IV Continuous <Continuous>  chlorhexidine 0.12% Liquid 15 milliLiter(s) Oral Mucosa every 12 hours  chlorhexidine 2% Cloths 1 Application(s) Topical <User Schedule>  cisatracurium Infusion 2 MICROgram(s)/kG/Min (28.7 mL/Hr) IV Continuous <Continuous>  clotrimazole 1% Cream 1 Application(s) Topical two times a day  dexAMETHasone  IVPB 20 milliGRAM(s) IV Intermittent daily  esmolol  Infusion 50 MICROgram(s)/kG/Min (71.7 mL/Hr) IV Continuous <Continuous>  HYDROmorphone Infusion. 1 mG/Hr (1 mL/Hr) IV Continuous <Continuous>  linezolid  IVPB 600 milliGRAM(s) IV Intermittent every 12 hours  norepinephrine Infusion 0.05 MICROgram(s)/kG/Min (11.2 mL/Hr) IV Continuous <Continuous>  pantoprazole  Injectable 40 milliGRAM(s) IV Push daily  piperacillin/tazobactam IVPB.. 4.5 Gram(s) IV Intermittent every 12 hours  polyethylene glycol 3350 17 Gram(s) Oral two times a day  propofol Infusion 50 MICROgram(s)/kG/Min (71.7 mL/Hr) IV Continuous <Continuous>  senna 2 Tablet(s) Oral at bedtime  sodium chloride 3%  Inhalation 4 milliLiter(s) Inhalation every 6 hours         ECMO Day# 1    cannulated overnight     Vital Signs Last 24 Hrs  T(C): 37.2 (26 Feb 2025 08:00), Max: 38 (25 Feb 2025 18:00)  T(F): 99 (26 Feb 2025 08:00), Max: 100.4 (25 Feb 2025 18:00)  HR: 89 (26 Feb 2025 09:00) (78 - 107)  BP: 97/54 (25 Feb 2025 18:30) (97/54 - 128/76)  BP(mean): 66 (25 Feb 2025 18:30) (66 - 90)  RR: 14 (26 Feb 2025 09:00) (13 - 26)  SpO2: 91% (26 Feb 2025 09:00) (80% - 97%)      Adult Advanced Hemodynamics Last 24 Hrs    I&O's Summary    25 Feb 2025 07:01  -  26 Feb 2025 07:00  --------------------------------------------------------  IN: 1954.5 mL / OUT: 2520 mL / NET: -565.5 mL    26 Feb 2025 07:01  -  26 Feb 2025 09:01  --------------------------------------------------------  IN: 275.6 mL / OUT: 825 mL / NET: -549.4 mL          ECMO SETTINGS:  Type:		[x ] Venovenous		[ ] Venoarterial  Cannulation Site(s):     Flow: 4.45 	          P1:       142           Delta P: 31  RPM: 	3160	  P2:    174              SVO2: 81.5    Oxygenator:	Sweep:  8   L/min	FiO2: 1.0    ABG - ( 26 Feb 2025 04:25 )  pH: 7.48  /  pCO2: 44    /  pO2: 64    / HCO3: 33    / Base Excess: 8.3   /  SaO2: 90.7                  VENTILATOR SETTINGS:     Mode: AC/ CMV (Assist Control/ Continuous Mandatory Ventilation)  RR (machine): 14  FiO2: 100  PEEP: 16  ITime: 1  MAP: 20  PC: 16  PIP: 32        LABS:                          11.2   17.61 )-----------( 121      ( 26 Feb 2025 02:20 )             39.4                          02-26    136  |  95[L]  |  23  ----------------------------<  149[H]  4.3   |  27  |  1.53[H]    Ca    8.1[L]      26 Feb 2025 02:20  Phos  3.0     02-26  Mg     2.10     02-26    TPro  6.7  /  Alb  2.7[L]  /  TBili  1.0  /  DBili  x   /  AST  78[H]  /  ALT  76[H]  /  AlkPhos  59  02-26      LIVER FUNCTIONS - ( 26 Feb 2025 02:20 )  Alb: 2.7 g/dL / Pro: 6.7 g/dL / ALK PHOS: 59 U/L / ALT: 76 U/L / AST: 78 U/L / GGT: x             PT/INR - ( 26 Feb 2025 05:30 )   PT: 23.4 sec;   INR: 1.98 ratio         PTT - ( 26 Feb 2025 08:03 )  PTT:53.7 sec      Culture - Sputum (collected 25 Feb 2025 12:20)  Source: .Sputum Sputum  Gram Stain (25 Feb 2025 20:54):    Moderate polymorphonuclear leukocytes per low power field    No Squamous epithelial cells per low power field    Rare Gram Positive Cocci in Pairs and Chains per oil power field    Rare Gram Positive Rods per oil power field    Culture - Urine (collected 25 Feb 2025 06:30)  Source: Catheterized Catheterized  Final Report (26 Feb 2025 08:38):    <10,000 CFU/mL Normal Urogenital Keysha    Urinalysis with Rflx Culture (collected 24 Feb 2025 21:49)    Culture - Blood (collected 24 Feb 2025 18:19)  Source: .Blood Blood-Peripheral  Preliminary Report (26 Feb 2025 01:03):    No growth at 24 hours    Culture - Blood (collected 24 Feb 2025 18:19)  Source: .Blood Blood-Peripheral  Preliminary Report (26 Feb 2025 01:03):    No growth at 24 hours          ACTIVE MEDS:    MEDICATIONS  (STANDING):  albuterol/ipratropium for Nebulization 3 milliLiter(s) Nebulizer every 6 hours  argatroban Infusion 0.7 MICROgram(s)/kG/Min (10 mL/Hr) IV Continuous <Continuous>  chlorhexidine 0.12% Liquid 15 milliLiter(s) Oral Mucosa every 12 hours  chlorhexidine 2% Cloths 1 Application(s) Topical <User Schedule>  cisatracurium Infusion 2 MICROgram(s)/kG/Min (28.7 mL/Hr) IV Continuous <Continuous>  clotrimazole 1% Cream 1 Application(s) Topical two times a day  dexAMETHasone  IVPB 20 milliGRAM(s) IV Intermittent daily  esmolol  Infusion 50 MICROgram(s)/kG/Min (71.7 mL/Hr) IV Continuous <Continuous>  HYDROmorphone Infusion. 1 mG/Hr (1 mL/Hr) IV Continuous <Continuous>  linezolid  IVPB 600 milliGRAM(s) IV Intermittent every 12 hours  norepinephrine Infusion 0.05 MICROgram(s)/kG/Min (11.2 mL/Hr) IV Continuous <Continuous>  pantoprazole  Injectable 40 milliGRAM(s) IV Push daily  piperacillin/tazobactam IVPB.. 4.5 Gram(s) IV Intermittent every 12 hours  polyethylene glycol 3350 17 Gram(s) Oral two times a day  propofol Infusion 50 MICROgram(s)/kG/Min (71.7 mL/Hr) IV Continuous <Continuous>  senna 2 Tablet(s) Oral at bedtime  sodium chloride 3%  Inhalation 4 milliLiter(s) Inhalation every 6 hours         ECMO Day# 1    cannulated overnight     Vital Signs Last 24 Hrs  T(C): 37.2 (26 Feb 2025 08:00), Max: 38 (25 Feb 2025 18:00)  T(F): 99 (26 Feb 2025 08:00), Max: 100.4 (25 Feb 2025 18:00)  HR: 89 (26 Feb 2025 09:00) (78 - 107)  BP: 97/54 (25 Feb 2025 18:30) (97/54 - 128/76)  BP(mean): 66 (25 Feb 2025 18:30) (66 - 90)  RR: 14 (26 Feb 2025 09:00) (13 - 26)  SpO2: 91% (26 Feb 2025 09:00) (80% - 97%)    PHYSICAL EXAM:  GENERAL: NAD, sedated, intubated   HEAD:  Atraumatic, Normocephalic  EYES: EOMI, PERRLA, conjunctiva and sclera clear  ENT: Moist mucous membranes  NECK: Supple, No JVD, RIJ cannula   CHEST/LUNG: decreased breath sounds, crackles at base  HEART: Regular rate and rhythm; No murmurs, rubs, or gallops  ABDOMEN: Bowel sounds present; Soft, Nontender, Nondistended. No hepatomegally, enlarged pannus with erythema   EXTREMITIES:  2+ Peripheral Pulses, brisk capillary refill. No clubbing, cyanosis, diffuse edema b.l LE         Adult Advanced Hemodynamics Last 24 Hrs    I&O's Summary    25 Feb 2025 07:01  -  26 Feb 2025 07:00  --------------------------------------------------------  IN: 1954.5 mL / OUT: 2520 mL / NET: -565.5 mL    26 Feb 2025 07:01  -  26 Feb 2025 09:01  --------------------------------------------------------  IN: 275.6 mL / OUT: 825 mL / NET: -549.4 mL          ECMO SETTINGS:  Type:		[x ] Venovenous		[ ] Venoarterial  Cannulation Site(s):     Flow: 4.45 	          P1:       142           Delta P: 31  RPM: 	3160	  P2:    174              SVO2: 81.5    Oxygenator:	Sweep:  8   L/min	FiO2: 1.0    ABG - ( 26 Feb 2025 04:25 )  pH: 7.48  /  pCO2: 44    /  pO2: 64    / HCO3: 33    / Base Excess: 8.3   /  SaO2: 90.7                  VENTILATOR SETTINGS:     Mode: AC/ CMV (Assist Control/ Continuous Mandatory Ventilation)  RR (machine): 14  FiO2: 100  PEEP: 16  ITime: 1  MAP: 20  PC: 16  PIP: 32        LABS:                          11.2   17.61 )-----------( 121      ( 26 Feb 2025 02:20 )             39.4                          02-26    136  |  95[L]  |  23  ----------------------------<  149[H]  4.3   |  27  |  1.53[H]    Ca    8.1[L]      26 Feb 2025 02:20  Phos  3.0     02-26  Mg     2.10     02-26    TPro  6.7  /  Alb  2.7[L]  /  TBili  1.0  /  DBili  x   /  AST  78[H]  /  ALT  76[H]  /  AlkPhos  59  02-26      LIVER FUNCTIONS - ( 26 Feb 2025 02:20 )  Alb: 2.7 g/dL / Pro: 6.7 g/dL / ALK PHOS: 59 U/L / ALT: 76 U/L / AST: 78 U/L / GGT: x             PT/INR - ( 26 Feb 2025 05:30 )   PT: 23.4 sec;   INR: 1.98 ratio         PTT - ( 26 Feb 2025 08:03 )  PTT:53.7 sec      Culture - Sputum (collected 25 Feb 2025 12:20)  Source: .Sputum Sputum  Gram Stain (25 Feb 2025 20:54):    Moderate polymorphonuclear leukocytes per low power field    No Squamous epithelial cells per low power field    Rare Gram Positive Cocci in Pairs and Chains per oil power field    Rare Gram Positive Rods per oil power field    Culture - Urine (collected 25 Feb 2025 06:30)  Source: Catheterized Catheterized  Final Report (26 Feb 2025 08:38):    <10,000 CFU/mL Normal Urogenital Keysha    Urinalysis with Rflx Culture (collected 24 Feb 2025 21:49)    Culture - Blood (collected 24 Feb 2025 18:19)  Source: .Blood Blood-Peripheral  Preliminary Report (26 Feb 2025 01:03):    No growth at 24 hours    Culture - Blood (collected 24 Feb 2025 18:19)  Source: .Blood Blood-Peripheral  Preliminary Report (26 Feb 2025 01:03):    No growth at 24 hours          ACTIVE MEDS:    MEDICATIONS  (STANDING):  albuterol/ipratropium for Nebulization 3 milliLiter(s) Nebulizer every 6 hours  argatroban Infusion 0.7 MICROgram(s)/kG/Min (10 mL/Hr) IV Continuous <Continuous>  chlorhexidine 0.12% Liquid 15 milliLiter(s) Oral Mucosa every 12 hours  chlorhexidine 2% Cloths 1 Application(s) Topical <User Schedule>  cisatracurium Infusion 2 MICROgram(s)/kG/Min (28.7 mL/Hr) IV Continuous <Continuous>  clotrimazole 1% Cream 1 Application(s) Topical two times a day  dexAMETHasone  IVPB 20 milliGRAM(s) IV Intermittent daily  esmolol  Infusion 50 MICROgram(s)/kG/Min (71.7 mL/Hr) IV Continuous <Continuous>  HYDROmorphone Infusion. 1 mG/Hr (1 mL/Hr) IV Continuous <Continuous>  linezolid  IVPB 600 milliGRAM(s) IV Intermittent every 12 hours  norepinephrine Infusion 0.05 MICROgram(s)/kG/Min (11.2 mL/Hr) IV Continuous <Continuous>  pantoprazole  Injectable 40 milliGRAM(s) IV Push daily  piperacillin/tazobactam IVPB.. 4.5 Gram(s) IV Intermittent every 12 hours  polyethylene glycol 3350 17 Gram(s) Oral two times a day  propofol Infusion 50 MICROgram(s)/kG/Min (71.7 mL/Hr) IV Continuous <Continuous>  senna 2 Tablet(s) Oral at bedtime  sodium chloride 3%  Inhalation 4 milliLiter(s) Inhalation every 6 hours

## 2025-02-26 NOTE — PHYSICAL THERAPY INITIAL EVALUATION ADULT - PERTINENT HX OF CURRENT PROBLEM, REHAB EVAL
39 year old Male who is transferred for VV ECMO for ARDS and severe hypoxia. Patient initially presented to [place] on 2/24 for SOB and bilateral LE edema. 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 He is a current every day smoker of [place] and marijuana, but does not drink or do other drugs. Currently lives with his mother.  At [facility] where he was getting an eval last year for shortness of breath, he had a sleep study and was diagnosed with sleep apnea, prescribed a PAP machine, which he has in his room but the mother is not sure how many nights per week he uses it. Recently, the last day or two, his mother and brother have been under the weather with URIs and the patient 2 days ago started to develop a runny nose, cough, some wheezing of the chest, as well as worsening shortness of breath and fevers at home. He called his aunt who told him to go get checked out and then he landed at the [place] ED. Patient found to be reportedly hypoxemic to 70% on room air with worsening respiratory status/secretions and somnolence in the ED. Patient was intubated with difficulty (7 attempts) due to very large tongue secretions. Despite optimal vent management, patient remained hypoxemic. Unable to prone due to obesity. Patient cannulated for VV ECMO on 2/25 and transferred to [facility] for further management.

## 2025-02-26 NOTE — H&P ADULT - NSHPLABSRESULTS_GEN_ALL_CORE
LABS:  ABG - ( 2025 02:20 )  pH, Arterial: 7.51  pH, Blood: x     /  pCO2: 41    /  pO2: 90    / HCO3: 33    / Base Excess: 8.9   /  SaO2: 98.0                                    11.2   17.61 )-----------( 121      ( 2025 02:20 )             39.4         136  |  95[L]  |  23  ----------------------------<  149[H]  4.3   |  27  |  1.53[H]    Ca    8.1[L]      2025 02:20  Phos  3.0       Mg     2.10         TPro  6.7  /  Alb  2.7[L]  /  TBili  1.0  /  DBili  x   /  AST  78[H]  /  ALT  76[H]  /  AlkPhos  59      LIVER FUNCTIONS - ( 2025 02:20 )  Alb: 2.7 g/dL / Pro: 6.7 g/dL / ALK PHOS: 59 U/L / ALT: 76 U/L / AST: 78 U/L / GGT: x           PT/INR - ( 2025 02:20 )   PT: 17.3 sec;   INR: 1.46 ratio         PTT - ( 2025 02:20 )  PTT:25.5 sec        Urinalysis Basic - ( 2025 02:20 )    Color: Yellow / Appearance: Clear / S.008 / pH: x  Gluc: 149 mg/dL / Ketone: Negative mg/dL  / Bili: Negative / Urobili: 0.2 mg/dL   Blood: x / Protein: Negative mg/dL / Nitrite: Negative   Leuk Esterase: Small / RBC: 2 /HPF / WBC 8 /HPF   Sq Epi: x / Non Sq Epi: 1 /HPF / Bacteria: Negative /HPF LABS:  ABG - ( 2025 02:20 )  pH, Arterial: 7.51  pH, Blood: x     /  pCO2: 41    /  pO2: 90    / HCO3: 33    / Base Excess: 8.9   /  SaO2: 98.0                            11.2   17.61 )-----------( 121      ( 2025 02:20 )             39.4         136  |  95[L]  |  23  ----------------------------<  149[H]  4.3   |  27  |  1.53[H]    Ca    8.1[L]      2025 02:20  Phos  3.0       Mg     2.10         TPro  6.7  /  Alb  2.7[L]  /  TBili  1.0  /  DBili  x   /  AST  78[H]  /  ALT  76[H]  /  AlkPhos  59      LIVER FUNCTIONS - ( 2025 02:20 )  Alb: 2.7 g/dL / Pro: 6.7 g/dL / ALK PHOS: 59 U/L / ALT: 76 U/L / AST: 78 U/L / GGT: x           PT/INR - ( 2025 02:20 )   PT: 17.3 sec;   INR: 1.46 ratio         PTT - ( 2025 02:20 )  PTT:25.5 sec        Urinalysis Basic - ( 2025 02:20 )    Color: Yellow / Appearance: Clear / S.008 / pH: x  Gluc: 149 mg/dL / Ketone: Negative mg/dL  / Bili: Negative / Urobili: 0.2 mg/dL   Blood: x / Protein: Negative mg/dL / Nitrite: Negative   Leuk Esterase: Small / RBC: 2 /HPF / WBC 8 /HPF   Sq Epi: x / Non Sq Epi: 1 /HPF / Bacteria: Negative /HPF

## 2025-02-26 NOTE — H&P ADULT - ASSESSMENT
40 yo M w/ class III obesity, pAfib (no AC), HTN, BEA (on CPAP), history of b/l papilledema attributed to pseudotumor cerebri s/p LP in 2024 with very high opening pressure, who is transferred for VV ECMO for ARDS and severe hypoxia. Patient initially presented to [place] on 2/24 for SOB and b/l LE edema. 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 He is a current every day smoker of [place] and marijuana, but does not drink or do other drugs. Currently lives with his mother. There is a long term partner in his life, but they are not , and they have an on/off relationship currently not very involved with each other as per the patient's mother and aunt.  At [facility] where he was getting an eval last year for shortness of breath, he had a sleep study and was diagnosed with sleep apnea, prescribed a PAP machine, which he has in his room but the mother is not sure how many nights per week he uses it. Recently, the last day or two, his mother and brother have been under the weather with URIs and the patient 2 days ago started to develop a ruynny nose, ough, some wheezing of the chest, as well as worsening shortness of breath and fevers at home. He called his aunt who told him to go get checked out and then he landed at the [place] ED. Patient found to be reportedly hypoxemic to 70% on RA with worsening respiratory status/secretions and somnolence in the ED. Patient was intubated with difficulty (7 attempts) due to very large tongue secretions. Despite optimal vent management, patient remained hypoxemic. Unable to prone due to obesity. Patient cannulated for VV ECMO on 2/25 and transferred to [facility] for further management.     [place] labs notable for MRSA PCR -, Fluvid -, urine legionella -, sputum gram stain  with GPC and GPR    CTA chest on 2/24 negative for pulmonary embolism, notable for patchy bilateral lower lobe opacities, hepatomegaly, mild ascites, edema/induration of the abdominal pannus.    LE duplex on 2/25 negative for DVT    TTE on 2/25 showed LV EF 61%, enlarged RV with TAPSE 2.1cm, ePASP at least 49 (Patient had 10/2024 TTE with normal LV and RV with ePASP 14).    Only home med is Lasix. Not on acetazolamide for pseudotumor or on Wegovy (was pending auth)    Here for VV ECMO    Plan  #Neuro  - history of pseudotumor cerebri s/p LP under fluoro in 2024 with opening pressure in the 50s  - was considered to start acetazolamide but never started  - MRI late 2024 also showed possible pituitary mass but unclear because of pressure effect from pseudotumor cerebri causing partially empty sella?  - will check prolactin, lower utility of checking cortisol axis given already received steroids but should be investigated, will check ACTH should be suppressed by steroids  - TSH normal but will check fT4 and T3 given c/f pituitary adenoma  - fent drip, prop drip    #Cardiovascular  - echo from 2024 did not demonstrate elevation of pulmonary pressures, but now with severe PH suggesting chronicity on TTE at   - ROBERT 2/26  - trend trop given elevated at   - EKG    ECMO SETTINGS:  current VV ECMO settings RPM Flow 4.5 L, sweep 8, FiO2 1.   20 cm RIJ  25 cm femoral catheter  LVOT VTI 17 cm on ROBERT, LVOT diameter 2.4 cm. . CO 7.6 L/min. peripheral sats 83%, starting esmolol gtt to lower HR given high CO    #Pulmonary  - c/f acute on suspected chronic pulmonary hypertension in setting of possible BEA/OHS  - ARDS due to pneumonia possible  - possible pulmonary edema as well  - RVP and BAL cultures  - bronchoscopy demonstrated  - CT chest otherwise did not reveal PE or significant parenchymal abnormalities apart from infiltrates, atelectasis  - 20 mg dexamethasone x 5 days, then 10 mg and taper as per clinical status pneumonia/ARDS  - f/u BAL  - DuoNeb q6 hours + 3% saline inhalational  - smoker  - history of diagnosed BEA/?OHS intermittent compliance to home PAP    #Renal  - ELLA - improving, likely ATN/vascular congestion  - UOP more brisk after ECMO cannulation    #Infectious disease  - Linezolid, Zosyn 2/26 -   - was on cefepime 2/25  - was on aztihro 2/25  - full RVP  - BCx x 2 2/24, UCx 2/25, tracheal aspirate Cx from 2/25 showing some organisms pending speciation but included GPC in pairs  - urine legionella neg  - step pnuemo Ag sent at [facility]  - repeat cultures   - BAL cultures  - possible penile meatus yellow discharge check urine GC/chamydia, check HIV    #Hematology/Oncology  - argatroban  - trend coags  - mild anemia Hb 11s on admission down from 13 baseline - check iron studies, retic, LDH, hapto  - give 1 u PRBC 2/26 for O2 delivery    #Gastrointestinal  - feeds, bowel regimen  - hepatomegaly and mild ascites noted on CT A/P - no pocket to tap  - check liver sono - a/f cirrhosis   - senna 2/26 -   - Miralax 2/26 -     #Endocrine  - obesity with metabolic syndrome    #FEN  - nutrition consult    #PPx  - argatroban  - full code as per mother  - L subclavian TLC inserted 2/25   - L radial artery line 2/25  - RIJ ECMO cannulat 2/26 at 20 cm  - R fem ECMO cannula 2/26 at 25 cm 40 yo M w/ class III obesity, pAfib (no AC), HTN, BEA (on CPAP), history of b/l papilledema attributed to pseudotumor cerebri s/p LP in 2024 with very high opening pressure, who is transferred for VV ECMO for ARDS and severe hypoxia. Patient initially presented to [place] on 2/24 for SOB and b/l LE edema. 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 He is a current every day smoker of [place] and marijuana, but does not drink or do other drugs. Currently lives with his mother. There is a long term partner in his life, but they are not , and they have an on/off relationship currently not very involved with each other as per the patient's mother and aunt.  At [facility] where he was getting an eval last year for shortness of breath, he had a sleep study and was diagnosed with sleep apnea, prescribed a PAP machine, which he has in his room but the mother is not sure how many nights per week he uses it. Recently, the last day or two, his mother and brother have been under the weather with URIs and the patient 2 days ago started to develop a ruynny nose, ough, some wheezing of the chest, as well as worsening shortness of breath and fevers at home. He called his aunt who told him to go get checked out and then he landed at the [place] ED. Patient found to be reportedly hypoxemic to 70% on RA with worsening respiratory status/secretions and somnolence in the ED. Patient was intubated with difficulty (7 attempts) due to very large tongue secretions. Despite optimal vent management, patient remained hypoxemic. Unable to prone due to obesity. Patient cannulated for VV ECMO on 2/25 and transferred to [facility] for further management.     [place] labs notable for MRSA PCR -, Fluvid -, urine legionella -, sputum gram stain  with GPC and GPR    CTA chest on 2/24 negative for pulmonary embolism, notable for patchy bilateral lower lobe opacities, hepatomegaly, mild ascites, edema/induration of the abdominal pannus.    LE duplex on 2/25 negative for DVT    TTE on 2/25 showed LV EF 61%, enlarged RV with TAPSE 2.1cm, ePASP at least 49 (Patient had 10/2024 TTE with normal LV and RV with ePASP 14).    Only home med is Lasix. Not on acetazolamide for pseudotumor or on Wegovy (was pending auth)    Here for VV ECMO, pneumonia bacterial on viral leading to ARDS and volume overload probably in settitng of undiagnosed acute on chronic pHTN group II/III    Plan  #Neuro  - history of pseudotumor cerebri s/p LP under fluoro in 2024 with opening pressure in the 50s  - was considered to start acetazolamide but never started  - MRI late 2024 also showed possible pituitary mass but unclear because of pressure effect from pseudotumor cerebri causing partially empty sella?  - will check prolactin, lower utility of checking cortisol axis given already received steroids but should be investigated, will check ACTH should be suppressed by steroids  - TSH normal but will check fT4 and T3 given c/f pituitary adenoma  - fent drip, prop drip, cisat drip    #Cardiovascular  - echo from 2024 did not demonstrate elevation of pulmonary pressures, but now with severe PH suggesting chronicity on TTE at   - ROBERT 2/26  - trend trop given elevated at   - EKG    ECMO SETTINGS:  current VV ECMO settings RPM Flow 4.5 L, sweep 8, FiO2 1.   20 cm RIJ  25 cm femoral catheter  LVOT VTI 17 cm on ROBERT, LVOT diameter 2.4 cm. . CO 7.6 L/min. peripheral sats 83%, starting esmolol gtt to lower HR given high CO    #Pulmonary  - c/f acute on suspected chronic pulmonary hypertension in setting of possible BEA/OHS  - ARDS due to pneumonia possible  - possible pulmonary edema as well  - RVP and BAL cultures  - bronchoscopy demonstrated copious purulent secretions 2/26  - CT chest otherwise did not reveal PE or significant parenchymal abnormalities apart from infiltrates, atelectasis  - 20 mg dexamethasone x 5 days, then 10 mg and taper as per clinical status pneumonia/ARDS  - f/u BAL  - DuoNeb q6 hours + 3% saline inhalational  - smoker  - history of diagnosed BEA/?OHS intermittent compliance to home PAP  - driving pressure 2/26 was ~14, plateau on 6 cc/kg (400 ml) was 32 peep 16  - currently on pressure control while on ecmo    #Renal  - ELLA - improving, likely ATN/vascular congestion  - UOP more brisk after ECMO cannulation    #Infectious disease  - Linezolid, Zosyn 2/26 -   - was on cefepime 2/25  - was on aztihro 2/25  - full RVP  - BCx x 2 2/24, UCx 2/25, tracheal aspirate Cx from 2/25 showing some organisms pending speciation but included GPC in pairs  - urine legionella neg  - step pnuemo Ag sent at [facility]  - repeat cultures at [facility]  - BAL cultures 2/26  - possible penile meatus yellow discharge check urine GC/chamydia, check HIV    #Hematology/Oncology  - argatroban  - trend coags  - mild anemia Hb 11s on admission down from 13 baseline - check iron studies, retic, LDH, hapto  - give 1 u PRBC 2/26 for O2 delivery    #Gastrointestinal  - feeds, bowel regimen  - hepatomegaly and mild ascites noted on CT A/P - no pocket to tap  - check liver sono - a/f cirrhosis - probably fatty liver  - senna 2/26 -   - Miralax 2/26 -     #Endocrine  - obesity with metabolic syndrome  - diabetes with steroid induced hypoglycemia  - piutuiotary workup, steroids as above  - ISS    #FEN  - nutrition consult    #PPx  - argatroban  - full code as per mother  - L subclavian TLC inserted 2/25   - L radial artery line 2/25  - RIJ ECMO cannulat 2/26 at 20 cm  - R fem ECMO cannula 2/26 at 25 cm 40 yo M w/ class III obesity, pAfib (no AC), HTN, BEA (on CPAP), history of b/l papilledema attributed to pseudotumor cerebri s/p LP in 2024 with very high opening pressure, who is transferred for VV ECMO for ARDS and severe hypoxia. Patient initially presented to [place] on 2/24 for SOB and b/l LE edema. 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 He is a current every day smoker of [place] and marijuana, but does not drink or do other drugs. Currently lives with his mother. There is a long term partner in his life, but they are not , and they have an on/off relationship currently not very involved with each other as per the patient's mother and aunt.  At [facility] where he was getting an eval last year for shortness of breath, he had a sleep study and was diagnosed with sleep apnea, prescribed a PAP machine, which he has in his room but the mother is not sure how many nights per week he uses it. Recently, the last day or two, his mother and brother have been under the weather with URIs and the patient 2 days ago started to develop a ruynny nose, cough, some wheezing of the chest, as well as worsening shortness of breath and fevers at home. He called his aunt who told him to go get checked out and then he landed at the [place] ED. Patient found to be reportedly hypoxemic to 70% on RA with worsening respiratory status/secretions and somnolence in the ED. Patient was intubated with difficulty (7 attempts) due to very large tongue secretions. Despite optimal vent management, patient remained hypoxemic. Unable to prone due to obesity. Patient cannulated for VV ECMO on 2/25 and transferred to [facility] for further management.     [place] labs notable for MRSA PCR -, Fluvid -, urine legionella -, sputum gram stain  with GPC and GPR    CTA chest on 2/24 negative for pulmonary embolism, notable for patchy bilateral lower lobe opacities, hepatomegaly, mild ascites, edema/induration of the abdominal pannus.    LE duplex on 2/25 negative for DVT    TTE on 2/25 showed LV EF 61%, enlarged RV with TAPSE 2.1cm, ePASP at least 49 (Patient had 10/2024 TTE with normal LV and RV with ePASP 14).    Only home med is Lasix. Not on acetazolamide for pseudotumor or on Wegovy (was pending auth)    Here for VV ECMO, pneumonia bacterial on viral leading to ARDS and volume overload probably in setting of undiagnosed acute on chronic pHTN group II/III    Plan  #Neuro  - history of pseudotumor cerebri s/p LP under fluoro in 2024 with opening pressure in the 50s  - was considered to start acetazolamide but never started  - MRI late 2024 also showed possible pituitary mass but unclear because of pressure effect from pseudotumor cerebri causing partially empty sella?  - will check prolactin, lower utility of checking cortisol axis given already received steroids but should be investigated, will check ACTH should be suppressed by steroids  - TSH normal but will check fT4 and T3 given c/f pituitary adenoma  - fent drip, prop drip, cisat drip    #Cardiovascular  - echo from 2024 did not demonstrate elevation of pulmonary pressures, but now with severe PH suggesting chronicity on TTE at   - ROBERT 2/26  - trend trop given elevated at   - EKG  - bot hypotensive here, requiring esmolol for high native CO to match circuit flows    - ECMO SETTINGS:  - current VV ECMO settings RPM Flow 4.5 L, sweep 8, FiO2 1.   - 20 cm RIJ  - 25 cm femoral catheter  - LVOT VTI 17 cm on ROBERT, LVOT diameter 2.4 cm. . CO 7.6 L/min. peripheral sats 83%, starting esmolol gtt to lower HR given high CO    #Pulmonary  - c/f acute on suspected chronic pulmonary hypertension in setting of possible BEA/OHS  - ARDS due to pneumonia possible  - possible pulmonary edema as well  - RVP and BAL cultures  - bronchoscopy demonstrated copious purulent secretions 2/26  - CT chest otherwise did not reveal PE or significant parenchymal abnormalities apart from infiltrates, atelectasis  - 20 mg dexamethasone x 5 days, then 10 mg and taper as per clinical status pneumonia/ARDS  - f/u BAL  - DuoNeb q6 hours + 3% saline inhalational  - smoker  - history of diagnosed BEA/?OHS intermittent compliance to home PAP  - driving pressure 2/26 was ~14, plateau on 6 cc/kg (400 ml) was 32 peep 16  - currently on pressure control while on ecmo  - difficult glottic visualization due to large tongue upon intubation 2/25    #Renal  - ELLA - improving, likely ATN/vascular congestion  - UOP more brisk after ECMO cannulation    #Infectious disease  - Linezolid, Zosyn 2/26 -   - was on cefepime 2/25  - was on aztihro 2/25  - full RVP  - BCx x 2 2/24, UCx 2/25, tracheal aspirate Cx from 2/25 showing some organisms pending speciation but included GPC in pairs  - urine legionella neg, culture sent at   - step pneumo Ag sent at [facility]  - repeat cultures at [facility]  - BAL cultures 2/26  - possible penile meatus yellow discharge check urine GC/chamydia, check HIV    #Hematology/Oncology  - argatroban  - trend coags  - mild anemia Hb 11s on admission down from 13 baseline - check iron studies, retic, LDH, hapto  - give 1 u PRBC 2/26 for O2 delivery    #Gastrointestinal  - feeds, bowel regimen  - denied diarrhea, n/v as per family  - hepatomegaly and mild ascites noted on CT A/P - no pocket to tap  - check liver sono - a/f cirrhosis - probably fatty liver  - senna 2/26 -   - Miralax 2/26 -     #Endocrine  - obesity with metabolic syndrome  - diabetes with steroid induced hypoglycemia  - pituitary workup, steroids as above given recent weight gain but likely volume though  - ISS    #FEN  - nutrition consult    #PPx  - argatroban  - full code as per mother  - L subclavian TLC inserted 2/25   - L radial artery line 2/25  - RIJ ECMO cannulat 2/26 at 20 cm  - R fem ECMO cannula 2/26 at 25 cm 40 yo M w/ class III obesity, pAfib (no AC), HTN, BEA (on CPAP), history of b/l papilledema attributed to pseudotumor cerebri s/p LP in 2024 with very high opening pressure, who is transferred for VV ECMO for ARDS and severe hypoxia. Patient initially presented to [place] on 2/24 for SOB and b/l LE edema. 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 He is a current every day smoker of [place] and marijuana, but does not drink or do other drugs. Currently lives with his mother. There is a long term partner in his life, but they are not , and they have an on/off relationship currently not very involved with each other as per the patient's mother and aunt.  At [facility] where he was getting an eval last year for shortness of breath, he had a sleep study and was diagnosed with sleep apnea, prescribed a PAP machine, which he has in his room but the mother is not sure how many nights per week he uses it. Recently, the last day or two, his mother and brother have been under the weather with URIs and the patient 2 days ago started to develop a ruynny nose, cough, some wheezing of the chest, as well as worsening shortness of breath and fevers at home. He called his aunt who told him to go get checked out and then he landed at the [place] ED. Patient found to be reportedly hypoxemic to 70% on RA with worsening respiratory status/secretions and somnolence in the ED. Patient was intubated with difficulty (7 attempts) due to very large tongue secretions. Despite optimal vent management, patient remained hypoxemic. Unable to prone due to obesity. Patient cannulated for VV ECMO on 2/25 and transferred to [facility] for further management.     [place] labs notable for MRSA PCR -, Fluvid -, urine legionella -, sputum gram stain  with GPC and GPR    CTA chest on 2/24 negative for pulmonary embolism, notable for patchy bilateral lower lobe opacities, hepatomegaly, mild ascites, edema/induration of the abdominal pannus.    LE duplex on 2/25 negative for DVT    TTE on 2/25 showed LV EF 61%, enlarged RV with TAPSE 2.1cm, ePASP at least 49 (Patient had 10/2024 TTE with normal LV and RV with ePASP 14).    Only home med is Lasix. Not on acetazolamide for pseudotumor or on Wegovy (was pending auth)    Here for VV ECMO, pneumonia bacterial on viral leading to ARDS and volume overload probably in setting of undiagnosed acute on chronic pHTN group II/III    Plan  #Neuro  - history of pseudotumor cerebri s/p LP under fluoro in 2024 with opening pressure in the 50s  - was considered to start acetazolamide but never started  - MRI late 2024 also showed possible pituitary mass but unclear because of pressure effect from pseudotumor cerebri causing partially empty sella?  - will check prolactin, lower utility of checking cortisol axis given already received steroids but should be investigated, will check ACTH should be suppressed by steroids  - TSH normal but will check fT4 and T3 given c/f pituitary adenoma  - fent drip, prop drip, cisat drip    #Cardiovascular  - echo from 2024 did not demonstrate elevation of pulmonary pressures, but now with severe PH suggesting chronicity on TTE at   - ROBERT 2/26  - trend trop given elevated at   - EKG  - bot hypotensive here, requiring esmolol for high native CO to match circuit flows    - ECMO SETTINGS:  - current VV ECMO settings RPM Flow 4.5 L, sweep 8, FiO2 1.   - 20 cm RIJ  - 25 cm femoral catheter  - LVOT VTI 17 cm on ROBERT, LVOT diameter 2.4 cm. . CO 7.6 L/min. peripheral sats 83%, starting esmolol gtt to lower HR given high CO    #Pulmonary  - c/f acute on suspected chronic pulmonary hypertension in setting of possible BEA/OHS  - ARDS due to pneumonia possible  - possible pulmonary edema as well  - RVP and BAL cultures  - bronchoscopy demonstrated copious purulent secretions 2/26  - CT chest otherwise did not reveal PE or significant parenchymal abnormalities apart from infiltrates, atelectasis  - 20 mg dexamethasone x 5 days, then 10 mg and taper as per clinical status pneumonia/ARDS  - f/u BAL  - DuoNeb q6 hours + 3% saline inhalational  - smoker  - history of diagnosed BEA/?OHS intermittent compliance to home PAP  - driving pressure 2/26 was ~14, plateau on 6 cc/kg (400 ml) was 32 peep 16  - currently on pressure control while on ecmo  - difficult glottic visualization due to large tongue upon intubation 2/25    #Renal  - ELLA - improving, likely ATN/vascular congestion  - UOP more brisk after ECMO cannulation    #Infectious disease  - Linezolid, Zosyn 2/26 -   - was on cefepime 2/25  - was on aztihro 2/25  - full RVP  - BCx x 2 2/24, UCx 2/25, tracheal aspirate Cx from 2/25 showing some organisms pending speciation but included GPC in pairs  - urine legionella neg, culture sent at   - step pneumo Ag sent at [facility]  - repeat cultures at [facility]  - BAL cultures 2/26  - possible penile meatus yellow discharge check urine GC/chamydia, check HIV  - candidal intertrigo +/- cellulitis of the pannus - clotrimazole cream 1% BID 2/26 -+ abx as per above    #Hematology/Oncology  - argatroban  - trend coags  - mild anemia Hb 11s on admission down from 13 baseline - check iron studies, retic, LDH, hapto  - give 1 u PRBC 2/26 for O2 delivery  - LE duplex 2/25 neg    #Gastrointestinal  - feeds, bowel regimen  - denied diarrhea, n/v as per family  - hepatomegaly and mild ascites noted on CT A/P - no pocket to tap  - check liver sono - a/f cirrhosis - probably fatty liver  - senna 2/26 -   - Miralax 2/26 -     #Endocrine  - obesity with metabolic syndrome  - diabetes with steroid induced hypoglycemia  - pituitary workup, steroids as above given recent weight gain but likely volume though  - ISS    #FEN  - nutrition consult    #PPx  - argatroban  - full code as per mother  - L subclavian TLC inserted 2/25   - L radial artery line 2/25  - RIJ ECMO cannulat 2/26 at 20 cm  - R fem ECMO cannula 2/26 at 25 cm

## 2025-02-26 NOTE — CONSULT NOTE ADULT - ASSESSMENT
40 yo M w/ class III obesity, pAfib (no AC), HTN, BEA (on CPAP), history of b/l papilledema attributed to pseudotumor cerebri s/p LP in 2024 with very high opening pressure, who is transferred for VV ECMO for ARDS and severe hypoxia. Patient cannulated for VV ECMO on 2/25 and transferred to [facility] for further management. Palliative care consulted for goals of care and complex medical decision making. 38 yo M w/ class III obesity, pAfib (no AC), HTN, BEA (on CPAP), history of b/l papilledema attributed to pseudotumor cerebri s/p LP in 2024 with very high opening pressure, who is transferred for VV ECMO for ARDS and severe hypoxia. Patient cannulated for VV ECMO on 2/25 and transferred to [facility] for further management. Palliative care consulted for goals of care and complex medical decision making; patient currently on ECMO.

## 2025-02-26 NOTE — PHYSICAL THERAPY INITIAL EVALUATION ADULT - ADDITIONAL COMMENTS
Patient intubated and sedated, unable to obtain social history but per chart patient lives with mother. Patient was independent prior and not using an assistive device.

## 2025-02-26 NOTE — OCCUPATIONAL THERAPY INITIAL EVALUATION ADULT - GENERAL OBSERVATIONS, REHAB EVAL
Patient received semisupine in bed in NAD, intubated and sedated. +VV-ECMO. +IV. +telemetry monitor. BP: 124/68 mmHg. Adult home

## 2025-02-26 NOTE — DIETITIAN NUTRITION RISK NOTIFICATION - TREATMENT: THE FOLLOWING DIET HAS BEEN RECOMMENDED
Diet, NPO with Tube Feed:   Tube Feeding Modality: Orogastric  Jevity 1.2 Elpidio (JEVITY1.2RTH)  Total Volume for 24 Hours (mL): 240  Continuous  Starting Tube Feed Rate {mL per Hour}: 10  Until Goal Tube Feed Rate (mL per Hour): 10  Tube Feed Duration (in Hours): 24  Tube Feed Start Time: 04:00 (02-26-25 @ 04:35) [Active]

## 2025-02-26 NOTE — CHART NOTE - NSCHARTNOTEFT_GEN_A_CORE
Procedure: Transesophageal Echocardiogram  Indication: shock  Consent: mother  Operators: Soham Fletcher  Anesthesia: propofol, fentanyl, cisatracurium  Findings:    AV: normal, no AI or vegetations    MV: normal, no MR or vegetations    PV: poorly visualized, no gross abnormalities    TV: fair visualization, grossly normal, no TR    LV: normal size and systolic function, so segmental wall motion abnormalities    RV: dilated grossly but with normal axial excursion grossly    LA: normal    RA: dilated    SVC: no significant respirophasic variation    Aorta: normal, no evidence of dissection or calcification    PA: poorly visualized    Doppler:  Note: no significant valvular regurgitation noted. LVOT VTI 17 cm. LVOT diameter 2.4 cm. CArdiac output 7.6 L/min    Other: bilateral lung consolidations. Trace pericardial effusion without evidence of pericardial tamponade.     Images stored in [employer] Procedure: Transesophageal Echocardiogram  Indication: shock  Consent: mother  Operators: Soham Fletcher  Anesthesia: propofol, fentanyl, cisatracurium  Findings:    AV: normal, no AI or vegetations    MV: normal, no MR or vegetations    PV: poorly visualized, no gross abnormalities    TV: fair visualization, grossly normal, no TR    LV: normal size and systolic function, so segmental wall motion abnormalities    RV: dilated grossly but with normal axial excursion grossly    LA: normal    RA: dilated    SVC: no significant respirophasic variation    Aorta: normal, no evidence of dissection or calcification    PA: poorly visualized    Doppler:  Note: no significant valvular regurgitation noted. LVOT VTI 17 cm. LVOT diameter 2.4 cm. CArdiac output 7.6 L/min    Other: bilateral lung consolidations. Trace pericardial effusion without evidence of pericardial tamponade.     Images stored in [employer]    Attending Attestation:  I was present during the key portions of the procedure and immediately available during the entire procedure.  Jae Rousseau MD  Attending  Pulmonary & Critical Care Medicine

## 2025-02-27 LAB
ADD ON TEST-SPECIMEN IN LAB: SIGNIFICANT CHANGE UP
ADD ON TEST-SPECIMEN IN LAB: SIGNIFICANT CHANGE UP
ALBUMIN SERPL ELPH-MCNC: 2.4 G/DL — LOW (ref 3.3–5)
ALBUMIN SERPL ELPH-MCNC: 2.5 G/DL — LOW (ref 3.3–5)
ALBUMIN SERPL ELPH-MCNC: 2.6 G/DL — LOW (ref 3.3–5)
ALBUMIN SERPL ELPH-MCNC: 2.7 G/DL — LOW (ref 3.3–5)
ALBUMIN SERPL ELPH-MCNC: 2.8 G/DL — LOW (ref 3.3–5)
ALP SERPL-CCNC: 51 U/L — SIGNIFICANT CHANGE UP (ref 40–120)
ALP SERPL-CCNC: 51 U/L — SIGNIFICANT CHANGE UP (ref 40–120)
ALP SERPL-CCNC: 56 U/L — SIGNIFICANT CHANGE UP (ref 40–120)
ALP SERPL-CCNC: 56 U/L — SIGNIFICANT CHANGE UP (ref 40–120)
ALP SERPL-CCNC: 61 U/L — SIGNIFICANT CHANGE UP (ref 40–120)
ALT FLD-CCNC: 55 U/L — HIGH (ref 4–41)
ALT FLD-CCNC: 56 U/L — HIGH (ref 4–41)
ALT FLD-CCNC: 59 U/L — HIGH (ref 4–41)
ALT FLD-CCNC: 62 U/L — HIGH (ref 4–41)
ALT FLD-CCNC: 63 U/L — HIGH (ref 4–41)
ANION GAP SERPL CALC-SCNC: 10 MMOL/L — SIGNIFICANT CHANGE UP (ref 7–14)
ANION GAP SERPL CALC-SCNC: 11 MMOL/L — SIGNIFICANT CHANGE UP (ref 7–14)
ANION GAP SERPL CALC-SCNC: 11 MMOL/L — SIGNIFICANT CHANGE UP (ref 7–14)
ANION GAP SERPL CALC-SCNC: 12 MMOL/L — SIGNIFICANT CHANGE UP (ref 7–14)
ANION GAP SERPL CALC-SCNC: 13 MMOL/L — SIGNIFICANT CHANGE UP (ref 7–14)
APTT BLD: 48.1 SEC — HIGH (ref 24.5–35.6)
APTT BLD: 49.6 SEC — HIGH (ref 24.5–35.6)
APTT BLD: 51.6 SEC — HIGH (ref 24.5–35.6)
APTT BLD: 52.4 SEC — HIGH (ref 24.5–35.6)
APTT BLD: 55.2 SEC — HIGH (ref 24.5–35.6)
APTT BLD: 56.4 SEC — HIGH (ref 24.5–35.6)
AST SERPL-CCNC: 122 U/L — HIGH (ref 4–40)
AST SERPL-CCNC: 132 U/L — HIGH (ref 4–40)
AST SERPL-CCNC: 56 U/L — HIGH (ref 4–40)
AST SERPL-CCNC: 58 U/L — HIGH (ref 4–40)
AST SERPL-CCNC: 82 U/L — HIGH (ref 4–40)
BASOPHILS # BLD AUTO: 0.02 K/UL — SIGNIFICANT CHANGE UP (ref 0–0.2)
BASOPHILS # BLD AUTO: 0.03 K/UL — SIGNIFICANT CHANGE UP (ref 0–0.2)
BASOPHILS NFR BLD AUTO: 0.1 % — SIGNIFICANT CHANGE UP (ref 0–2)
BILIRUB DIRECT SERPL-MCNC: 0.6 MG/DL — HIGH (ref 0–0.3)
BILIRUB INDIRECT FLD-MCNC: 0.8 MG/DL — SIGNIFICANT CHANGE UP (ref 0–1)
BILIRUB SERPL-MCNC: 1 MG/DL — SIGNIFICANT CHANGE UP (ref 0.2–1.2)
BILIRUB SERPL-MCNC: 1 MG/DL — SIGNIFICANT CHANGE UP (ref 0.2–1.2)
BILIRUB SERPL-MCNC: 1.1 MG/DL — SIGNIFICANT CHANGE UP (ref 0.2–1.2)
BILIRUB SERPL-MCNC: 1.4 MG/DL — HIGH (ref 0.2–1.2)
BLOOD GAS ARTERIAL - LYTES,HGB,ICA,LACT RESULT: SIGNIFICANT CHANGE UP
BLOOD GAS ARTERIAL COMPREHENSIVE RESULT: SIGNIFICANT CHANGE UP
BLOOD GAS ECMO POST MEMBRANE - ARTERIAL RESULT: SIGNIFICANT CHANGE UP
BLOOD GAS ECMO POST MEMBRANE - ARTERIAL RESULT: SIGNIFICANT CHANGE UP
BLOOD GAS ECMO PRE MEMBRANE - VENOUS RESULT: SIGNIFICANT CHANGE UP
BLOOD GAS ECMO PRE MEMBRANE - VENOUS RESULT: SIGNIFICANT CHANGE UP
BUN SERPL-MCNC: 26 MG/DL — HIGH (ref 7–23)
BUN SERPL-MCNC: 26 MG/DL — HIGH (ref 7–23)
BUN SERPL-MCNC: 27 MG/DL — HIGH (ref 7–23)
BUN SERPL-MCNC: 28 MG/DL — HIGH (ref 7–23)
BUN SERPL-MCNC: 28 MG/DL — HIGH (ref 7–23)
C TRACH RRNA SPEC QL NAA+PROBE: SIGNIFICANT CHANGE UP
CA-I BLD-SCNC: 0.99 MMOL/L — LOW (ref 1.15–1.29)
CA-I BLD-SCNC: 1 MMOL/L — LOW (ref 1.15–1.29)
CA-I BLD-SCNC: 1.03 MMOL/L — LOW (ref 1.15–1.29)
CA-I BLD-SCNC: 1.07 MMOL/L — LOW (ref 1.15–1.29)
CALCIUM SERPL-MCNC: 7.5 MG/DL — LOW (ref 8.4–10.5)
CALCIUM SERPL-MCNC: 7.5 MG/DL — LOW (ref 8.4–10.5)
CALCIUM SERPL-MCNC: 7.6 MG/DL — LOW (ref 8.4–10.5)
CALCIUM SERPL-MCNC: 8 MG/DL — LOW (ref 8.4–10.5)
CALCIUM SERPL-MCNC: 8.2 MG/DL — LOW (ref 8.4–10.5)
CHLORIDE SERPL-SCNC: 100 MMOL/L — SIGNIFICANT CHANGE UP (ref 98–107)
CHLORIDE SERPL-SCNC: 101 MMOL/L — SIGNIFICANT CHANGE UP (ref 98–107)
CHLORIDE SERPL-SCNC: 102 MMOL/L — SIGNIFICANT CHANGE UP (ref 98–107)
CHLORIDE SERPL-SCNC: 96 MMOL/L — LOW (ref 98–107)
CHLORIDE SERPL-SCNC: 98 MMOL/L — SIGNIFICANT CHANGE UP (ref 98–107)
CO2 SERPL-SCNC: 25 MMOL/L — SIGNIFICANT CHANGE UP (ref 22–31)
CO2 SERPL-SCNC: 27 MMOL/L — SIGNIFICANT CHANGE UP (ref 22–31)
CO2 SERPL-SCNC: 29 MMOL/L — SIGNIFICANT CHANGE UP (ref 22–31)
CREAT SERPL-MCNC: 1.09 MG/DL — SIGNIFICANT CHANGE UP (ref 0.5–1.3)
CREAT SERPL-MCNC: 1.35 MG/DL — HIGH (ref 0.5–1.3)
CREAT SERPL-MCNC: 1.37 MG/DL — HIGH (ref 0.5–1.3)
CREAT SERPL-MCNC: 1.43 MG/DL — HIGH (ref 0.5–1.3)
CREAT SERPL-MCNC: 1.49 MG/DL — HIGH (ref 0.5–1.3)
CULTURE RESULTS: NO GROWTH — SIGNIFICANT CHANGE UP
CULTURE RESULTS: NO GROWTH — SIGNIFICANT CHANGE UP
EGFR: 62 ML/MIN/1.73M2 — SIGNIFICANT CHANGE UP
EGFR: 62 ML/MIN/1.73M2 — SIGNIFICANT CHANGE UP
EGFR: 65 ML/MIN/1.73M2 — SIGNIFICANT CHANGE UP
EGFR: 65 ML/MIN/1.73M2 — SIGNIFICANT CHANGE UP
EGFR: 68 ML/MIN/1.73M2 — SIGNIFICANT CHANGE UP
EGFR: 68 ML/MIN/1.73M2 — SIGNIFICANT CHANGE UP
EGFR: 69 ML/MIN/1.73M2 — SIGNIFICANT CHANGE UP
EGFR: 69 ML/MIN/1.73M2 — SIGNIFICANT CHANGE UP
EOSINOPHIL # BLD AUTO: 0 K/UL — SIGNIFICANT CHANGE UP (ref 0–0.5)
EOSINOPHIL # BLD AUTO: 0 K/UL — SIGNIFICANT CHANGE UP (ref 0–0.5)
EOSINOPHIL # BLD AUTO: 0.01 K/UL — SIGNIFICANT CHANGE UP (ref 0–0.5)
EOSINOPHIL # BLD AUTO: 0.01 K/UL — SIGNIFICANT CHANGE UP (ref 0–0.5)
EOSINOPHIL # BLD AUTO: 0.03 K/UL — SIGNIFICANT CHANGE UP (ref 0–0.5)
EOSINOPHIL NFR BLD AUTO: 0 % — SIGNIFICANT CHANGE UP (ref 0–6)
EOSINOPHIL NFR BLD AUTO: 0.1 % — SIGNIFICANT CHANGE UP (ref 0–6)
FIBRINOGEN PPP-MCNC: 230 MG/DL — SIGNIFICANT CHANGE UP (ref 200–465)
FIBRINOGEN PPP-MCNC: 232 MG/DL — SIGNIFICANT CHANGE UP (ref 200–465)
GLUCOSE BLDC GLUCOMTR-MCNC: 125 MG/DL — HIGH (ref 70–99)
GLUCOSE BLDC GLUCOMTR-MCNC: 146 MG/DL — HIGH (ref 70–99)
GLUCOSE BLDC GLUCOMTR-MCNC: 165 MG/DL — HIGH (ref 70–99)
GLUCOSE BLDC GLUCOMTR-MCNC: 168 MG/DL — HIGH (ref 70–99)
GLUCOSE SERPL-MCNC: 151 MG/DL — HIGH (ref 70–99)
GLUCOSE SERPL-MCNC: 153 MG/DL — HIGH (ref 70–99)
GLUCOSE SERPL-MCNC: 160 MG/DL — HIGH (ref 70–99)
GLUCOSE SERPL-MCNC: 170 MG/DL — HIGH (ref 70–99)
GLUCOSE SERPL-MCNC: 174 MG/DL — HIGH (ref 70–99)
HAPTOGLOB SERPL-MCNC: 76 MG/DL — SIGNIFICANT CHANGE UP (ref 34–200)
HAPTOGLOB SERPL-MCNC: 88 MG/DL — SIGNIFICANT CHANGE UP (ref 34–200)
HCT VFR BLD CALC: 36.7 % — LOW (ref 39–50)
HCT VFR BLD CALC: 37.6 % — LOW (ref 39–50)
HCT VFR BLD CALC: 39.1 % — SIGNIFICANT CHANGE UP (ref 39–50)
HCT VFR BLD CALC: 41.4 % — SIGNIFICANT CHANGE UP (ref 39–50)
HCT VFR BLD CALC: 42.9 % — SIGNIFICANT CHANGE UP (ref 39–50)
HGB BLD-MCNC: 10.6 G/DL — LOW (ref 13–17)
HGB BLD-MCNC: 10.9 G/DL — LOW (ref 13–17)
HGB BLD-MCNC: 11.2 G/DL — LOW (ref 13–17)
HGB BLD-MCNC: 12.2 G/DL — LOW (ref 13–17)
HGB BLD-MCNC: 12.3 G/DL — LOW (ref 13–17)
IANC: 19.03 K/UL — HIGH (ref 1.8–7.4)
IANC: 19.54 K/UL — HIGH (ref 1.8–7.4)
IANC: 19.99 K/UL — HIGH (ref 1.8–7.4)
IANC: 20.3 K/UL — HIGH (ref 1.8–7.4)
IANC: 20.36 K/UL — HIGH (ref 1.8–7.4)
IMM GRANULOCYTES NFR BLD AUTO: 0.7 % — SIGNIFICANT CHANGE UP (ref 0–0.9)
IMM GRANULOCYTES NFR BLD AUTO: 0.8 % — SIGNIFICANT CHANGE UP (ref 0–0.9)
IMM GRANULOCYTES NFR BLD AUTO: 0.8 % — SIGNIFICANT CHANGE UP (ref 0–0.9)
INR BLD: 2.19 RATIO — HIGH (ref 0.85–1.16)
INR BLD: 2.3 RATIO — HIGH (ref 0.85–1.16)
INR BLD: 2.37 RATIO — HIGH (ref 0.85–1.16)
INR BLD: 2.38 RATIO — HIGH (ref 0.85–1.16)
INR BLD: 2.44 RATIO — HIGH (ref 0.85–1.16)
LDH SERPL L TO P-CCNC: 390 U/L — HIGH (ref 135–225)
LDH SERPL L TO P-CCNC: 647 U/L — HIGH (ref 135–225)
LYMPHOCYTES # BLD AUTO: 0.43 K/UL — LOW (ref 1–3.3)
LYMPHOCYTES # BLD AUTO: 0.45 K/UL — LOW (ref 1–3.3)
LYMPHOCYTES # BLD AUTO: 0.53 K/UL — LOW (ref 1–3.3)
LYMPHOCYTES # BLD AUTO: 0.64 K/UL — LOW (ref 1–3.3)
LYMPHOCYTES # BLD AUTO: 0.81 K/UL — LOW (ref 1–3.3)
LYMPHOCYTES # BLD AUTO: 2 % — LOW (ref 13–44)
LYMPHOCYTES # BLD AUTO: 2.1 % — LOW (ref 13–44)
LYMPHOCYTES # BLD AUTO: 2.4 % — LOW (ref 13–44)
LYMPHOCYTES # BLD AUTO: 2.9 % — LOW (ref 13–44)
LYMPHOCYTES # BLD AUTO: 3.6 % — LOW (ref 13–44)
MAGNESIUM SERPL-MCNC: 2.1 MG/DL — SIGNIFICANT CHANGE UP (ref 1.6–2.6)
MAGNESIUM SERPL-MCNC: 2.2 MG/DL — SIGNIFICANT CHANGE UP (ref 1.6–2.6)
MAGNESIUM SERPL-MCNC: 2.3 MG/DL — SIGNIFICANT CHANGE UP (ref 1.6–2.6)
MCHC RBC-ENTMCNC: 22.4 PG — LOW (ref 27–34)
MCHC RBC-ENTMCNC: 22.5 PG — LOW (ref 27–34)
MCHC RBC-ENTMCNC: 22.5 PG — LOW (ref 27–34)
MCHC RBC-ENTMCNC: 22.6 PG — LOW (ref 27–34)
MCHC RBC-ENTMCNC: 22.6 PG — LOW (ref 27–34)
MCHC RBC-ENTMCNC: 28.6 G/DL — LOW (ref 32–36)
MCHC RBC-ENTMCNC: 28.7 G/DL — LOW (ref 32–36)
MCHC RBC-ENTMCNC: 28.9 G/DL — LOW (ref 32–36)
MCHC RBC-ENTMCNC: 29 G/DL — LOW (ref 32–36)
MCHC RBC-ENTMCNC: 29.5 G/DL — LOW (ref 32–36)
MCV RBC AUTO: 76.7 FL — LOW (ref 80–100)
MCV RBC AUTO: 77.7 FL — LOW (ref 80–100)
MCV RBC AUTO: 77.9 FL — LOW (ref 80–100)
MCV RBC AUTO: 78.2 FL — LOW (ref 80–100)
MCV RBC AUTO: 78.7 FL — LOW (ref 80–100)
MONOCYTES # BLD AUTO: 1.02 K/UL — HIGH (ref 0–0.9)
MONOCYTES # BLD AUTO: 1.16 K/UL — HIGH (ref 0–0.9)
MONOCYTES # BLD AUTO: 1.19 K/UL — HIGH (ref 0–0.9)
MONOCYTES # BLD AUTO: 1.34 K/UL — HIGH (ref 0–0.9)
MONOCYTES # BLD AUTO: 1.47 K/UL — HIGH (ref 0–0.9)
MONOCYTES NFR BLD AUTO: 4.7 % — SIGNIFICANT CHANGE UP (ref 2–14)
MONOCYTES NFR BLD AUTO: 5.3 % — SIGNIFICANT CHANGE UP (ref 2–14)
MONOCYTES NFR BLD AUTO: 5.4 % — SIGNIFICANT CHANGE UP (ref 2–14)
MONOCYTES NFR BLD AUTO: 6.4 % — SIGNIFICANT CHANGE UP (ref 2–14)
MONOCYTES NFR BLD AUTO: 6.5 % — SIGNIFICANT CHANGE UP (ref 2–14)
N GONORRHOEA RRNA SPEC QL NAA+PROBE: SIGNIFICANT CHANGE UP
NEUTROPHILS # BLD AUTO: 19.03 K/UL — HIGH (ref 1.8–7.4)
NEUTROPHILS # BLD AUTO: 19.54 K/UL — HIGH (ref 1.8–7.4)
NEUTROPHILS # BLD AUTO: 19.99 K/UL — HIGH (ref 1.8–7.4)
NEUTROPHILS # BLD AUTO: 20.3 K/UL — HIGH (ref 1.8–7.4)
NEUTROPHILS # BLD AUTO: 20.36 K/UL — HIGH (ref 1.8–7.4)
NEUTROPHILS NFR BLD AUTO: 89 % — HIGH (ref 43–77)
NEUTROPHILS NFR BLD AUTO: 90.7 % — HIGH (ref 43–77)
NEUTROPHILS NFR BLD AUTO: 91.5 % — HIGH (ref 43–77)
NEUTROPHILS NFR BLD AUTO: 91.5 % — HIGH (ref 43–77)
NEUTROPHILS NFR BLD AUTO: 91.7 % — HIGH (ref 43–77)
NRBC # BLD AUTO: 0.05 K/UL — HIGH (ref 0–0)
NRBC # BLD AUTO: 0.11 K/UL — HIGH (ref 0–0)
NRBC # BLD AUTO: 0.12 K/UL — HIGH (ref 0–0)
NRBC # BLD AUTO: 0.16 K/UL — HIGH (ref 0–0)
NRBC # BLD AUTO: 0.27 K/UL — HIGH (ref 0–0)
NRBC # FLD: 0.05 K/UL — HIGH (ref 0–0)
NRBC # FLD: 0.11 K/UL — HIGH (ref 0–0)
NRBC # FLD: 0.12 K/UL — HIGH (ref 0–0)
NRBC # FLD: 0.16 K/UL — HIGH (ref 0–0)
NRBC # FLD: 0.27 K/UL — HIGH (ref 0–0)
NRBC BLD AUTO-RTO: 0 /100 WBCS — SIGNIFICANT CHANGE UP (ref 0–0)
NRBC BLD AUTO-RTO: 1 /100 WBCS — HIGH (ref 0–0)
PHOSPHATE SERPL-MCNC: 4.7 MG/DL — HIGH (ref 2.5–4.5)
PHOSPHATE SERPL-MCNC: 4.7 MG/DL — HIGH (ref 2.5–4.5)
PHOSPHATE SERPL-MCNC: 4.8 MG/DL — HIGH (ref 2.5–4.5)
PHOSPHATE SERPL-MCNC: 5 MG/DL — HIGH (ref 2.5–4.5)
PHOSPHATE SERPL-MCNC: 5 MG/DL — HIGH (ref 2.5–4.5)
PLATELET # BLD AUTO: 131 K/UL — LOW (ref 150–400)
PLATELET # BLD AUTO: 136 K/UL — LOW (ref 150–400)
PLATELET # BLD AUTO: 142 K/UL — LOW (ref 150–400)
PLATELET # BLD AUTO: 146 K/UL — LOW (ref 150–400)
PLATELET # BLD AUTO: 146 K/UL — LOW (ref 150–400)
POTASSIUM SERPL-MCNC: 4 MMOL/L — SIGNIFICANT CHANGE UP (ref 3.5–5.3)
POTASSIUM SERPL-MCNC: 4.1 MMOL/L — SIGNIFICANT CHANGE UP (ref 3.5–5.3)
POTASSIUM SERPL-MCNC: 4.2 MMOL/L — SIGNIFICANT CHANGE UP (ref 3.5–5.3)
POTASSIUM SERPL-SCNC: 4 MMOL/L — SIGNIFICANT CHANGE UP (ref 3.5–5.3)
POTASSIUM SERPL-SCNC: 4.1 MMOL/L — SIGNIFICANT CHANGE UP (ref 3.5–5.3)
POTASSIUM SERPL-SCNC: 4.2 MMOL/L — SIGNIFICANT CHANGE UP (ref 3.5–5.3)
PROCALCITONIN SERPL-MCNC: 0.28 NG/ML — HIGH (ref 0.02–0.1)
PROCALCITONIN SERPL-MCNC: 0.3 NG/ML — HIGH (ref 0.02–0.1)
PROT SERPL-MCNC: 5.6 G/DL — LOW (ref 6–8.3)
PROT SERPL-MCNC: 5.6 G/DL — LOW (ref 6–8.3)
PROT SERPL-MCNC: 6.4 G/DL — SIGNIFICANT CHANGE UP (ref 6–8.3)
PROT SERPL-MCNC: 7 G/DL — SIGNIFICANT CHANGE UP (ref 6–8.3)
PROT SERPL-MCNC: 7.2 G/DL — SIGNIFICANT CHANGE UP (ref 6–8.3)
PROTHROM AB SERPL-ACNC: 25.9 SEC — HIGH (ref 9.9–13.4)
PROTHROM AB SERPL-ACNC: 26.5 SEC — HIGH (ref 9.9–13.4)
PROTHROM AB SERPL-ACNC: 27.3 SEC — HIGH (ref 9.9–13.4)
PROTHROM AB SERPL-ACNC: 27.4 SEC — HIGH (ref 9.9–13.4)
PROTHROM AB SERPL-ACNC: 28 SEC — HIGH (ref 9.9–13.4)
RBC # BLD: 4.71 M/UL — SIGNIFICANT CHANGE UP (ref 4.2–5.8)
RBC # BLD: 4.84 M/UL — SIGNIFICANT CHANGE UP (ref 4.2–5.8)
RBC # BLD: 5 M/UL — SIGNIFICANT CHANGE UP (ref 4.2–5.8)
RBC # BLD: 5.4 M/UL — SIGNIFICANT CHANGE UP (ref 4.2–5.8)
RBC # BLD: 5.45 M/UL — SIGNIFICANT CHANGE UP (ref 4.2–5.8)
RBC # FLD: 19.3 % — HIGH (ref 10.3–14.5)
RBC # FLD: 19.5 % — HIGH (ref 10.3–14.5)
RBC # FLD: 19.7 % — HIGH (ref 10.3–14.5)
RBC # FLD: 19.7 % — HIGH (ref 10.3–14.5)
RBC # FLD: 19.9 % — HIGH (ref 10.3–14.5)
SODIUM SERPL-SCNC: 136 MMOL/L — SIGNIFICANT CHANGE UP (ref 135–145)
SODIUM SERPL-SCNC: 136 MMOL/L — SIGNIFICANT CHANGE UP (ref 135–145)
SODIUM SERPL-SCNC: 137 MMOL/L — SIGNIFICANT CHANGE UP (ref 135–145)
SODIUM SERPL-SCNC: 140 MMOL/L — SIGNIFICANT CHANGE UP (ref 135–145)
SODIUM SERPL-SCNC: 140 MMOL/L — SIGNIFICANT CHANGE UP (ref 135–145)
SPECIMEN SOURCE: SIGNIFICANT CHANGE UP
TRIGL SERPL-MCNC: 217 MG/DL — HIGH
TRIGL SERPL-MCNC: 223 MG/DL — HIGH
VANCOMYCIN TROUGH SERPL-MCNC: 15.3 UG/ML — SIGNIFICANT CHANGE UP (ref 10–20)
WBC # BLD: 20.99 K/UL — HIGH (ref 3.8–10.5)
WBC # BLD: 21.34 K/UL — HIGH (ref 3.8–10.5)
WBC # BLD: 21.87 K/UL — HIGH (ref 3.8–10.5)
WBC # BLD: 22.26 K/UL — HIGH (ref 3.8–10.5)
WBC # BLD: 22.79 K/UL — HIGH (ref 3.8–10.5)
WBC # FLD AUTO: 20.99 K/UL — HIGH (ref 3.8–10.5)
WBC # FLD AUTO: 21.34 K/UL — HIGH (ref 3.8–10.5)
WBC # FLD AUTO: 21.87 K/UL — HIGH (ref 3.8–10.5)
WBC # FLD AUTO: 22.26 K/UL — HIGH (ref 3.8–10.5)
WBC # FLD AUTO: 22.79 K/UL — HIGH (ref 3.8–10.5)

## 2025-02-27 PROCEDURE — 31645 BRNCHSC W/THER ASPIR 1ST: CPT | Mod: GC

## 2025-02-27 PROCEDURE — 93308 TTE F-UP OR LMTD: CPT | Mod: 26,GC

## 2025-02-27 PROCEDURE — 33948 ECMO/ECLS DAILY MGMT-VENOUS: CPT | Mod: GC

## 2025-02-27 PROCEDURE — 71045 X-RAY EXAM CHEST 1 VIEW: CPT | Mod: 26

## 2025-02-27 PROCEDURE — 99291 CRITICAL CARE FIRST HOUR: CPT | Mod: GC,25

## 2025-02-27 PROCEDURE — 99292 CRITICAL CARE ADDL 30 MIN: CPT | Mod: GC,25

## 2025-02-27 RX ORDER — SENNA 187 MG
10 TABLET ORAL
Refills: 0 | Status: DISCONTINUED | OUTPATIENT
Start: 2025-02-27 | End: 2025-03-06

## 2025-02-27 RX ORDER — LABETALOL HYDROCHLORIDE 200 MG/1
5 TABLET, FILM COATED ORAL ONCE
Refills: 0 | Status: COMPLETED | OUTPATIENT
Start: 2025-02-27 | End: 2025-02-27

## 2025-02-27 RX ORDER — FUROSEMIDE 10 MG/ML
60 INJECTION INTRAMUSCULAR; INTRAVENOUS ONCE
Refills: 0 | Status: COMPLETED | OUTPATIENT
Start: 2025-02-27 | End: 2025-02-27

## 2025-02-27 RX ORDER — DEXMEDETOMIDINE HYDROCHLORIDE IN SODIUM CHLORIDE 4 UG/ML
1 INJECTION INTRAVENOUS
Qty: 400 | Refills: 0 | Status: DISCONTINUED | OUTPATIENT
Start: 2025-02-27 | End: 2025-03-08

## 2025-02-27 RX ORDER — MIDAZOLAM IN 0.9 % SOD.CHLORID 1 MG/ML
4 PLASTIC BAG, INJECTION (ML) INTRAVENOUS ONCE
Refills: 0 | Status: DISCONTINUED | OUTPATIENT
Start: 2025-02-27 | End: 2025-02-27

## 2025-02-27 RX ORDER — HYDROMORPHONE/SOD CHLOR,ISO/PF 2 MG/10 ML
1 SYRINGE (ML) INJECTION
Qty: 100 | Refills: 0 | Status: DISCONTINUED | OUTPATIENT
Start: 2025-02-27 | End: 2025-03-06

## 2025-02-27 RX ORDER — CALCIUM GLUCONATE 20 MG/ML
2 INJECTION, SOLUTION INTRAVENOUS ONCE
Refills: 0 | Status: COMPLETED | OUTPATIENT
Start: 2025-02-27 | End: 2025-02-27

## 2025-02-27 RX ORDER — PROPOFOL 10 MG/ML
25 INJECTION, EMULSION INTRAVENOUS
Qty: 1000 | Refills: 0 | Status: DISCONTINUED | OUTPATIENT
Start: 2025-02-27 | End: 2025-03-08

## 2025-02-27 RX ORDER — PIPERACILLIN-TAZO-DEXTROSE,ISO 2.25G/50ML
4.5 IV SOLUTION, PIGGYBACK PREMIX FROZEN(ML) INTRAVENOUS EVERY 8 HOURS
Refills: 0 | Status: DISCONTINUED | OUTPATIENT
Start: 2025-02-27 | End: 2025-03-03

## 2025-02-27 RX ORDER — NICARDIPINE HCL 30 MG
5 CAPSULE ORAL
Qty: 40 | Refills: 0 | Status: DISCONTINUED | OUTPATIENT
Start: 2025-02-27 | End: 2025-03-01

## 2025-02-27 RX ADMIN — Medication 1 APPLICATION(S): at 05:57

## 2025-02-27 RX ADMIN — Medication 4 MILLILITER(S): at 09:27

## 2025-02-27 RX ADMIN — Medication 4 MILLILITER(S): at 20:57

## 2025-02-27 RX ADMIN — CALCIUM GLUCONATE 200 GRAM(S): 20 INJECTION, SOLUTION INTRAVENOUS at 07:44

## 2025-02-27 RX ADMIN — PROPOFOL 72.2 MICROGRAM(S)/KG/MIN: 10 INJECTION, EMULSION INTRAVENOUS at 19:29

## 2025-02-27 RX ADMIN — IPRATROPIUM BROMIDE AND ALBUTEROL SULFATE 3 MILLILITER(S): .5; 2.5 SOLUTION RESPIRATORY (INHALATION) at 09:27

## 2025-02-27 RX ADMIN — PROPOFOL 72.2 MICROGRAM(S)/KG/MIN: 10 INJECTION, EMULSION INTRAVENOUS at 23:47

## 2025-02-27 RX ADMIN — Medication 1 APPLICATION(S): at 05:56

## 2025-02-27 RX ADMIN — Medication 15 MILLILITER(S): at 05:55

## 2025-02-27 RX ADMIN — Medication 4 MILLIGRAM(S): at 09:50

## 2025-02-27 RX ADMIN — Medication 166.67 MILLIGRAM(S): at 22:04

## 2025-02-27 RX ADMIN — PROPOFOL 71.7 MICROGRAM(S)/KG/MIN: 10 INJECTION, EMULSION INTRAVENOUS at 06:07

## 2025-02-27 RX ADMIN — INSULIN LISPRO 1: 100 INJECTION, SOLUTION INTRAVENOUS; SUBCUTANEOUS at 23:47

## 2025-02-27 RX ADMIN — Medication 4 MILLILITER(S): at 04:04

## 2025-02-27 RX ADMIN — DEXMEDETOMIDINE HYDROCHLORIDE IN SODIUM CHLORIDE 30.1 MICROGRAM(S)/KG/HR: 4 INJECTION INTRAVENOUS at 12:00

## 2025-02-27 RX ADMIN — MUPIROCIN CALCIUM 1 APPLICATION(S): 20 CREAM TOPICAL at 17:08

## 2025-02-27 RX ADMIN — DEXMEDETOMIDINE HYDROCHLORIDE IN SODIUM CHLORIDE 30.1 MICROGRAM(S)/KG/HR: 4 INJECTION INTRAVENOUS at 19:29

## 2025-02-27 RX ADMIN — INSULIN LISPRO 1: 100 INJECTION, SOLUTION INTRAVENOUS; SUBCUTANEOUS at 00:18

## 2025-02-27 RX ADMIN — IPRATROPIUM BROMIDE AND ALBUTEROL SULFATE 3 MILLILITER(S): .5; 2.5 SOLUTION RESPIRATORY (INHALATION) at 16:06

## 2025-02-27 RX ADMIN — Medication 4 MILLILITER(S): at 16:18

## 2025-02-27 RX ADMIN — Medication 25 GRAM(S): at 13:07

## 2025-02-27 RX ADMIN — CLOTRIMAZOLE 1 APPLICATION(S): 1 CREAM TOPICAL at 05:55

## 2025-02-27 RX ADMIN — Medication 10 MILLILITER(S): at 17:08

## 2025-02-27 RX ADMIN — CLOTRIMAZOLE 1 APPLICATION(S): 1 CREAM TOPICAL at 17:08

## 2025-02-27 RX ADMIN — POLYETHYLENE GLYCOL 3350 17 GRAM(S): 17 POWDER, FOR SOLUTION ORAL at 05:58

## 2025-02-27 RX ADMIN — Medication 25 MG/HR: at 19:29

## 2025-02-27 RX ADMIN — Medication 25 GRAM(S): at 06:02

## 2025-02-27 RX ADMIN — Medication 1 MG/HR: at 07:44

## 2025-02-27 RX ADMIN — Medication 1 APPLICATION(S): at 17:09

## 2025-02-27 RX ADMIN — Medication 166.67 MILLIGRAM(S): at 05:59

## 2025-02-27 RX ADMIN — NALOXEGOL OXALATE 12.5 MILLIGRAM(S): 12.5 TABLET, FILM COATED ORAL at 11:28

## 2025-02-27 RX ADMIN — LABETALOL HYDROCHLORIDE 5 MILLIGRAM(S): 200 TABLET, FILM COATED ORAL at 12:50

## 2025-02-27 RX ADMIN — PROPOFOL 71.7 MICROGRAM(S)/KG/MIN: 10 INJECTION, EMULSION INTRAVENOUS at 07:43

## 2025-02-27 RX ADMIN — POLYETHYLENE GLYCOL 3350 17 GRAM(S): 17 POWDER, FOR SOLUTION ORAL at 17:08

## 2025-02-27 RX ADMIN — IPRATROPIUM BROMIDE AND ALBUTEROL SULFATE 3 MILLILITER(S): .5; 2.5 SOLUTION RESPIRATORY (INHALATION) at 20:56

## 2025-02-27 RX ADMIN — CISATRACURIUM BESYLATE 28.7 MICROGRAM(S)/KG/MIN: 10 INJECTION, SOLUTION INTRAVENOUS at 07:43

## 2025-02-27 RX ADMIN — MUPIROCIN CALCIUM 1 APPLICATION(S): 20 CREAM TOPICAL at 05:55

## 2025-02-27 RX ADMIN — Medication 40 MILLIGRAM(S): at 11:28

## 2025-02-27 RX ADMIN — ARGATROBAN 15.8 MICROGRAM(S)/KG/MIN: 100 INJECTION, SOLUTION INTRAVENOUS at 07:44

## 2025-02-27 RX ADMIN — Medication 25 GRAM(S): at 22:29

## 2025-02-27 RX ADMIN — PROPOFOL 72.2 MICROGRAM(S)/KG/MIN: 10 INJECTION, EMULSION INTRAVENOUS at 22:29

## 2025-02-27 RX ADMIN — FUROSEMIDE 60 MILLIGRAM(S): 10 INJECTION INTRAMUSCULAR; INTRAVENOUS at 06:06

## 2025-02-27 RX ADMIN — Medication 1 MG/HR: at 19:30

## 2025-02-27 RX ADMIN — DEXAMETHASONE 110 MILLIGRAM(S): 0.5 TABLET ORAL at 05:55

## 2025-02-27 RX ADMIN — INSULIN LISPRO 1: 100 INJECTION, SOLUTION INTRAVENOUS; SUBCUTANEOUS at 11:26

## 2025-02-27 RX ADMIN — Medication 166.67 MILLIGRAM(S): at 14:30

## 2025-02-27 RX ADMIN — Medication 15 MILLILITER(S): at 17:08

## 2025-02-27 RX ADMIN — ARGATROBAN 15.8 MICROGRAM(S)/KG/MIN: 100 INJECTION, SOLUTION INTRAVENOUS at 19:29

## 2025-02-27 RX ADMIN — IPRATROPIUM BROMIDE AND ALBUTEROL SULFATE 3 MILLILITER(S): .5; 2.5 SOLUTION RESPIRATORY (INHALATION) at 04:04

## 2025-02-27 NOTE — PROGRESS NOTE ADULT - ASSESSMENT
38 yo M w/ class III obesity, pAfib (no AC), HTN, BEA (on CPAP), history of b/l pappiledema attributed to pseudotumor cerebri, who is transferred for VV ECMO for ARDS. Patient initially presented to [place] on 2/24 for SOB and b/l LE edema. As per chart review, patient endorses some degree of SOB and b/l LE edema x 3 months with significant weight gain. As per ICU team, patient had positive sick contacts with viral URI 1 to 2 weeks PTA and since then has had worsening SOB. Patient found to be reportedly hypoxemic to 70% on RA with worsening respiratory status/secretions and somnolence in the ED. Patient was intubated with difficulty (7 attempts). Despite optimal vent management, patient remained hypoxemic. Unable to prone due to obesity. Patient cannulated for VV ECMO on 2/25 and transferred to [facility] for further management.       Neuro  #Mental status   - history of pseudotumor cerebri s/p LP under fluoro in 2024 with opening pressure in the 50s  - was considered to start acetazolamide but never started  - MRI late 2024 also showed possible pituitary mass but unclear because of pressure effect from pseudotumor cerebri causing partially empty sella?  - will check prolactin, lower utility of checking cortisol axis given already received steroids but should be investigated, will check ACTH should be suppressed by steroids  - TSH normal but will check fT4 and T3 given c/f pituitary adenoma  - dilaudid drip, prop drip, cisat drip - will keep paralyzed today, RASS goal -4  - check TGs    Cardiovascular  #HTN   - echo from 2024 did not demonstrate elevation of pulmonary pressures, but now with severe PH suggesting chronicity on TTE at    - ROBERT 2/26 showing VTI 17  - follow up repeatTTE; TTE on 2/25 showed LV EF 61%, enlarged RV with TAPSE 2.1cm, ePASP at least 49 (Patient had 10/2024 TTE with normal LV and RV with ePASP 14).  - troponins initially elevated however downtrended   - started on esmolol drip for high flows with HR now in 90s      Pulmonary  #Pulmonary HTN  #ARDS  #Pneumonia   - c/f acute on suspected chronic pulmonary hypertension in setting of possible BEA/OHS c/b pneumonia, ARDS, and pulmonary edema   - CTA chest on 2/24 negative for pulmonary embolism, notable for patchy bilateral lower lobe opacities, hepatomegaly, mild ascites, edema/induration of the abdominal pannus.  - RVP negative  - follow up BAL cultures   - bronchoscopy demonstrated copious purulent secretions 2/26  - CT chest otherwise did not reveal PE or significant parenchymal abnormalities apart from infiltrates, atelectasis  - 20 mg dexamethasone x 5 days, then 10 mg and taper as per clinical status pneumonia/ARDS  - DuoNeb q6 hours + 3% saline inhalational  - history of diagnosed BEA/?OHS intermittent compliance to home PAP  - driving pressure 2/26 was ~14, plateau on 6 cc/kg (400 ml) was 32 peep 16  - currently on pressure control while on ecmo  - difficult glottic visualization due to large tongue upon intubation 2/25    #ECMO  VV ECMO		  Cannulation sites/dates:  Flow:		P1:		Delta P:  RPM:		P2:		SVO2:  Sweep:        L/min:		FIO2:      ECMO Calculated CO:  DO2/VO2:  VO2/DO2:    -Clinically perfusing. Lactate:   -ECMO sweep sighing q1hr  - Adjust circuit flow as tolerated with DO2:VO2 goal >2  -Monitor for hemolysis, chatter, evidence of recirculation, mixing      Gastrointestinal  #Diet   - feeds, bowel regimen  - denied diarrhea, n/v as per family  - hepatomegaly and mild ascites noted on CT A/P - no pocket to tap  - check liver sono - a/f cirrhosis - probably fatty liver  - senna 2/26 -   - Miralax 2/26 -    -check TGs      Gu/Renal  #ELLA  - ELLA - improving, likely ATN/vascular congestion  - UOP more brisk after ECMO cannulation  - monitor I?O     Infectious disease    - Linezolid, Zosyn 2/26 -   - dc linezolid and transition to vanco 2/.26  - was on cefepime 2/25  - was on aztihro 2/25  - full RVP negative  - BCx x 2 2/24, UCx 2/25, tracheal aspirate Cx from 2/25 showing some organisms pending speciation but included GPC in pairs  - urine legionella neg, culture sent at   - step pneumo Ag sent at   - repeat cultures at [facility]  - BAL cultures 2/26  - possible penile meatus yellow discharge check urine GC/chamydia, check HIV  - candidal intertrigo +/- cellulitis of the pannus - clotrimazole cream 1% BID 2/26 -+ abx as per above    Hematology/DVT ppx  #Anticoagulation   - argatroban  - trend coags  - mild anemia Hb 11s on admission down from 13 baseline - check iron studies, retic, LDH, hapto  - give 1 u PRBC 2/26 for O2 delivery  - LE duplex 2/25 neg    Endocrine  #DM  - obesity with metabolic syndrome  - diabetes with steroid induced hypoglycemia  - pituitary workup, steroids as above given recent weight gain but likely volume though  - ISS      Lines  - L subclavian TLC inserted 2/25   - L radial artery line 2/25  - RIJ ECMO cannula 2/26 at 20 cm  - R fem ECMO cannula 2/26 at 25 cm    GOC   -full code   -palliative involved  40 yo M w/ class III obesity, pAfib (no AC), HTN, BEA (on CPAP), history of b/l papilledema attributed to pseudotumor cerebri, who is transferred from [place] on 2/26 for VV ECMO 2/2 ARDS. Patient initially presented to [place] on 2/24 for SOB and b/l LE edema. As per chart review, patient endorses some degree of SOB and b/l LE edema x 3 months with significant weight gain. As per ICU team, patient had positive sick contacts with viral URI 1 to 2 weeks PTA and since then has had worsening SOB. Patient found to be reportedly hypoxemic to 70% on RA with worsening respiratory status/secretions and somnolence in the ED. Patient was a difficult intubation (7 attempts). Despite optimal vent management, patient remained hypoxemic. Unable to prone due to obesity. Patient cannulated for VV ECMO on 2/25 and transferred to [facility] for further management.     Neuro  #Mental status   #pseudotumor cerebri  - history of pseudotumor cerebri s/p LP under fluoro in 2024 with opening pressure in the 50s with MRI late 2024 also showed possible pituitary mass but unclear because of pressure effect from pseudotumor cerebri causing partially empty sella. Considering to start acetazolamide but never started  - prolactin high (30), defer checking cortisol axis given already received steroids but should be investigated, ACTH (<1.5) low but should be suppressed iso steroids  - TSH normal but will check fT4 and T3 given c/f pituitary adenoma  -sedated with dilaudid and propofol, will start precedex to assist with weaning precedex   -will stop paralytics today     Cardiovascular  #HTN   #Atrial fibrillation   -Hx of a. fib not on AC   - echo from 2024 did not demonstrate elevation of pulmonary pressures, but now with severe PH suggesting chronicity on TTE at    - ROBERT 2/26 showing VTI 17  - follow up repeat TTE; TTE on 2/25 showed LV EF 61%, enlarged RV with TAPSE 2.1cm, ePASP at least 49 (Patient had 10/2024 TTE with normal LV and RV with ePASP 14).  - troponins initially elevated however downtrended   - started on esmolol drip for high flows with HR now in 90s now D/C's      Pulmonary  #Pulmonary HTN  #ARDS  #Pneumonia   - c/f acute on suspected chronic pulmonary hypertension in setting of possible BEA/OHS c/b pneumonia, ARDS, and pulmonary edema   - CTA chest on 2/24 negative for pulmonary embolism, notable for patchy bilateral lower lobe opacities, hepatomegaly, mild ascites, edema/induration of the abdominal pannus.  - RVP negative  - follow up BAL cultures   - bronchoscopy demonstrated copious purulent secretions 2/26  - CT chest otherwise did not reveal PE or significant parenchymal abnormalities apart from infiltrates, atelectasis  - 20 mg dexamethasone x 5 days, then 10 mg and taper as per clinical status pneumonia/ARDS  - DuoNeb q6 hours + 3% saline inhalational  - history of diagnosed BEA/?OHS intermittent compliance to home PAP  - driving pressure 2/26 was ~14, plateau on 6 cc/kg (400 ml) was 32 peep 16  - currently on pressure control while on ecmo  - difficult glottic visualization due to large tongue upon intubation 2/25    #ECMO  VV ECMO		  Cannulation sites/dates:  Flow:		P1:		Delta P:  RPM:		P2:		SVO2:  Sweep:        L/min:		FIO2:      ECMO Calculated CO:  DO2/VO2:  VO2/DO2:    -Clinically perfusing. Lactate:   -ECMO sweep sighing q1hr  - Adjust circuit flow as tolerated with DO2:VO2 goal >2  -Monitor for hemolysis, chatter, evidence of recirculation, mixing      Gastrointestinal  #Diet   - feeds, bowel regimen  - denied diarrhea, n/v as per family  - hepatomegaly and mild ascites noted on CT A/P - no pocket to tap  - check liver sono - a/f cirrhosis - probably fatty liver  - senna 2/26 -   - Miralax 2/26 -    -check TGs      Gu/Renal  #ELLA  - ELLA - improving, likely ATN/vascular congestion  - UOP more brisk after ECMO cannulation  - monitor I?O     Infectious disease    - Linezolid, Zosyn 2/26 -   - dc linezolid and transition to vanco 2/.26  - was on cefepime 2/25  - was on aztihro 2/25  - full RVP negative  - BCx x 2 2/24, UCx 2/25, tracheal aspirate Cx from 2/25 showing some organisms pending speciation but included GPC in pairs  - urine legionella neg, culture sent at   - step pneumo Ag sent at [facility]  - repeat cultures at [facility]  - BAL cultures 2/26  - possible penile meatus yellow discharge check urine GC/chamydia, check HIV  - candidal intertrigo +/- cellulitis of the pannus - clotrimazole cream 1% BID 2/26 -+ abx as per above    Hematology/DVT ppx  #Anticoagulation   - argatroban  - trend coags  - mild anemia Hb 11s on admission down from 13 baseline - check iron studies, retic, LDH, hapto  - give 1 u PRBC 2/26 for O2 delivery  - LE duplex 2/25 neg    Endocrine  #DM  - obesity with metabolic syndrome  - diabetes with steroid induced hypoglycemia  - pituitary workup, steroids as above given recent weight gain but likely volume though  - ISS      Lines  - L subclavian TLC inserted 2/25   - L radial artery line 2/25  - RIJ ECMO cannula 2/26 at 20 cm  - R fem ECMO cannula 2/26 at 25 cm    GOC   -full code   -palliative involved  40 yo M w/ class III obesity, pAfib (no AC), HTN, BEA (on CPAP), history of b/l papilledema attributed to pseudotumor cerebri, who is transferred from [place] on 2/26 for VV ECMO 2/2 ARDS. Patient initially presented to [place] on 2/24 for SOB and b/l LE edema. As per chart review, patient endorses some degree of SOB and b/l LE edema x 3 months with significant weight gain. As per ICU team, patient had positive sick contacts with viral URI 1 to 2 weeks PTA and since then has had worsening SOB. Patient found to be reportedly hypoxemic to 70% on RA with worsening respiratory status/secretions and somnolence in the ED. Patient was a difficult intubation (7 attempts). Despite optimal vent management, patient remained hypoxemic. Unable to prone due to obesity. Patient cannulated for VV ECMO on 2/25 and transferred to [facility] for further management.     Neuro  #Mental status   #pseudotumor cerebri  - history of pseudotumor cerebri s/p LP under fluoro in 2024 with opening pressure in the 50s with MRI late 2024 also showed possible pituitary mass but unclear because of pressure effect from pseudotumor cerebri causing partially empty sella. Considering to start acetazolamide but never started  - prolactin high (30), defer checking cortisol axis given already received steroids but should be investigated, ACTH (<1.5) low but should be suppressed iso steroids  - TSH normal but will check fT4 and T3 given c/f pituitary adenoma  -sedated with Dilaudid and propofol, will start precedex to assist with weaning Precedex   -will stop paralytics today   -PT/OT following     Cardiovascular  #HTN   #Atrial fibrillation   -Hx of a. fib not on AC   - echo from 2024 did not demonstrate elevation of pulmonary pressures, but now with severe PH suggesting chronicity on TTE at    - ROBERT 2/26 showing VTI 17  - TTE on 2/25 showed LV EF 61%, enlarged RV with TAPSE 2.1cm, ePASP at least 49 (Patient had 10/2024 TTE with normal LV and RV with ePASP 14).  - troponins initially elevated, peaked at 162 however downtrended   - started on esmolol drip for high flows with HR now in 90s now D/C'd may need to consider again if reliant on high flows on circuit       Pulmonary  #Pulmonary HTN  #ARDS  #Multifocal Pneumonia   #BEA   - history of diagnosed BEA/?OHS intermittent compliance to home PAP  - c/f acute on suspected chronic pulmonary hypertension in setting of possible BEA/OHS c/b pneumonia, ARDS, and pulmonary edema   - CTA chest on 2/24 negative for pulmonary embolism, notable for patchy bilateral lower lobe opacities, hepatomegaly, mild ascites, edema/induration of the abdominal pannus.  - Full RVP and BAL cultures negative    - bronchoscopy demonstrated copious purulent secretions 2/26 and 2/27   - 20 mg dexamethasone x 5 days, then 10 mg and taper as per clinical status pneumonia/ARDS  - DuoNeb q6 hours + 3% saline will start IPV   -c/w PC/AC rest settings   - difficult glottic visualization due to large tongue upon intubation at [facility] 2/25    #ECMO  VV ECMO		  Cannulation sites/dates:  Flow: 5.5		P1: 217		Delta P: 40  RPM: 4065		P2: 178		SVO2: 76  Sweep: 5L/min:		FIO2: 1      ECMO Calculated CO: 7.28  DO2/VO2: 5.41  VO2/DO2: 0.18    -Clinically perfusing. Lactate: 1.8  -ECMO sweep sighing q1hr   - Adjust circuit flow as tolerated with DO2:VO2 goal >2  -Monitor for hemolysis, chatter, evidence of recirculation, mixing      Gastrointestinal  #Diet   -tolerating tube feeds  - c/w bowel regimen   - hepatomegaly and mild ascites noted on CT A/P - no pocket to tap  -RUQ ultrasound with hepatomegaly   - c/w bowel regime with senna, Miralax and naloxegol may need relistor if no BM    -triglycerides 223       Gu/Renal  #ELLA  - ELLA - improving, likely ATN/vascular congestion  - good UO   -c/w intermittent diuresis with lasix may consider dose of metolazone today with next lasix     Infectious disease  #leukocytosis  #Pneumonia   #cellulitis   -s/p cefepime, linezolid and aztihro 2/25-2/26  -c/w zosyn and vanco (2/26-   )  - full RVP negative  - BCx x 2 2/24, UCx 2/25, tracheal aspirate Cx from 2/25 with normal commensal kyle   - strep pneumo Ag and urine legionella negative  - BAL cultures 2/26 currently NGTD   - possible penile meatus yellow discharge check urine GC/chamydia, HIV negative   - candidal intertrigo +/- cellulitis of the pannus - clotrimazole cream 1% BID 2/26 -+ abx as per above    Hematology/DVT ppx  #Anticoagulation   #Anemia   -c/w argatroban with goal 50-90  - mild anemia Hb 11s on admission down from 13 - check iron studies, retic, LDH, hapto  -s/p 1 u PRBC 2/26 for O2 delivery  - LE duplex 2/25 neg    Endocrine  #DM  -A1c 6.7   - obesity with metabolic syndrome  - diabetes with steroid induced hypoglycemia  - pituitary workup, steroids as above given recent weight gain but likely volume though  - c/w ISS and FS Q6h and adjust as needed     SKin/Lines  #Access  #Cellulitis   #DTI  - L subclavian TLC inserted 2/25   - L radial artery line 2/25  - RIJ ECMO cannula 2/26 at 20 cm  - R fem ECMO cannula 2/26 at 25 cm  - candidal intertrigo +/- cellulitis of the pannus - clotrimazole cream 1% BID 2/26 -+ abx as per above  -as per nursing staff, patient with ? DTI to right buttocks   -wound consult placed     GOC   -full code   -palliative involved  and following while on ECMO   -Mother involved in care and life partner  38 yo M w/ class III obesity, pAfib (no AC), HTN, BEA (on CPAP), history of b/l papilledema attributed to pseudotumor cerebri, who is transferred from [place] on 2/26 for VV ECMO 2/2 ARDS. Patient initially presented to [place] on 2/24 for SOB and b/l LE edema. As per chart review, patient endorses some degree of SOB and b/l LE edema x 3 months with significant weight gain. As per ICU team, patient had positive sick contacts with viral URI 1 to 2 weeks PTA and since then has had worsening SOB. Patient found to be reportedly hypoxemic to 70% on RA with worsening respiratory status/secretions and somnolence in the ED. Patient was a difficult intubation (7 attempts). Despite optimal vent management, patient remained hypoxemic. Unable to prone due to obesity. Patient cannulated for VV ECMO on 2/25 and transferred to [facility] for further management.     Neuro  #Mental status   #pseudotumor cerebri  - history of pseudotumor cerebri s/p LP under fluoro in 2024 with opening pressure in the 50s with MRI late 2024 also showed possible pituitary mass but unclear because of pressure effect from pseudotumor cerebri causing partially empty sella. Considering to start acetazolamide but never started  - prolactin high (30), defer checking cortisol axis given already received steroids but should be investigated, ACTH (<1.5) low but should be suppressed iso steroids  - TSH normal but will check fT4 and T3 given c/f pituitary adenoma  -sedated with Dilaudid and propofol, will start precedex to assist with weaning Precedex   -will stop paralytics today   -PT/OT following     Cardiovascular  #HTN   #Atrial fibrillation   -Hx of a. fib not on AC   - echo from 2024 did not demonstrate elevation of pulmonary pressures, but now with severe PH suggesting chronicity on TTE at    - ROBERT 2/26 showing VTI 17  - TTE on 2/25 showed LV EF 61%, enlarged RV with TAPSE 2.1cm, ePASP at least 49 (Patient had 10/2024 TTE with normal LV and RV with ePASP 14).  - troponins initially elevated, peaked at 162 however downtrended   - s/p esmolol drip for high flows now D/C'd may need to consider again if need for higher flows  -hypertensive [id number]'s will start cardene gtt for now     Pulmonary  #Pulmonary HTN  #ARDS  #Multifocal Pneumonia   #BEA   - history of diagnosed BEA/?OHS intermittent compliance to home PAP  - c/f acute on suspected chronic pulmonary hypertension in setting of possible BEA/OHS c/b pneumonia, ARDS, and pulmonary edema   - CTA chest on 2/24 negative for pulmonary embolism, notable for patchy bilateral lower lobe opacities, hepatomegaly, mild ascites, edema/induration of the abdominal pannus.  - Full RVP and BAL cultures negative    - bronchoscopy demonstrated copious purulent secretions 2/26 and 2/27   - 20 mg dexamethasone x 5 days, then 10 mg and taper as per clinical status pneumonia/ARDS  - DuoNeb q6 hours + 3% saline will start IPV   -c/w PC/AC RR 14 PC: 14 PEEP:18 Fio2 50% ~250-300  - difficult glottic visualization due to large tongue upon intubation at [facility] 2/25    #ECMO  VV ECMO		  Cannulation sites/dates:  Flow: 5.5		P1: 217		Delta P: 40  RPM: 4065		P2: 178		SVO2: 76  Sweep: 5L/min:		FIO2: 1      ECMO Calculated CO: 7.28  DO2/VO2: 5.41  VO2/DO2: 0.18    -requiring high flows for oxygenation causing high delta P but D/V adequate, no lactate and SVO2 WNL but will watch closely   -Clinically perfusing. Lactate: 1.8  -ECMO sweep sighing q1hr   - Adjust circuit flow as tolerated with DO2:VO2 goal >2  -Monitor for hemolysis, chatter, evidence of recirculation, mixing      Gastrointestinal  #Diet   -tolerating tube feeds  - c/w bowel regimen   - hepatomegaly and mild ascites noted on CT A/P - no pocket to tap  -RUQ ultrasound with steatosis    - c/w bowel regimen with senna, Miralax and naloxegol, may need relistor if no BM    -triglycerides 223   -nutrition following       Gu/Renal  #ELLA  - ELLA - improving, likely ATN/vascular congestion  - good UO   -c/w intermittent diuresis with lasix may consider dose of metolazone today with next lasix     Infectious disease  #leukocytosis  #Pneumonia   #cellulitis   -s/p cefepime, linezolid and aztihro 2/25-2/26  -c/w zosyn and vanco (2/26-   ) trough due 2/27 @1400  - full RVP negative  - BCx x 2 2/24 currently NGTD, UCx 2/25 NGTD, tracheal aspirate Cx from 2/25 with normal commensal kyle   - strep pneumo Ag and urine legionella negative  - BAL cultures 2/26 currently NGTD   -? penile meatus yellow discharge pending results of GC/chamydia, HIV negative   - candidal intertrigo +/- cellulitis of the pannus - clotrimazole cream 1% BID 2/26 -+ abx as per above    Hematology/DVT ppx  #Anticoagulation   #Anemia   -c/w argatroban with goal 50-90  - mild anemia Hb 11s on admission down from 13 - check iron studies, retic, LDH, hapto  -s/p 1 u PRBC 2/26 for O2 delivery  - LE duplex 2/25 neg    Endocrine  #DM  -A1c 6.7   - obesity with metabolic syndrome  - diabetes with steroid induced hypoglycemia  - pituitary workup, steroids as above given recent weight gain but likely volume though  - c/w ISS and FS Q6h and adjust as needed     SKin/Lines  #Access  #Cellulitis   #DTI  - L subclavian TLC inserted 2/25   - L radial artery line 2/25  - RIJ ECMO cannula 2/26 at 20 cm  - R fem ECMO cannula 2/26 at 25 cm  - candidal intertrigo +/- cellulitis of the pannus - clotrimazole cream 1% BID 2/26 -+ abx as per above  -as per nursing staff, patient with ? DTI to right buttocks   -wound consult placed     GOC   -full code   -palliative involved  and following while on ECMO   -Mother involved in care and life partner  38 yo M w/ class III obesity, pAfib (no AC), HTN, BEA (on CPAP), history of b/l papilledema attributed to pseudotumor cerebri, who is transferred from [place] on 2/26 for VV ECMO 2/2 ARDS. Patient initially presented to [place] on 2/24 for SOB and b/l LE edema. As per chart review, patient endorses some degree of SOB and b/l LE edema x 3 months with significant weight gain. As per ICU team, patient had positive sick contacts with viral URI 1 to 2 weeks PTA and since then has had worsening SOB. Patient found to be reportedly hypoxemic to 70% on RA with worsening respiratory status/secretions and somnolence in the ED. Patient was a difficult intubation (7 attempts). Despite optimal vent management, patient remained hypoxemic. Unable to prone due to obesity. Patient cannulated for VV ECMO on 2/25 and transferred to [facility] for further management.     Neuro  #Mental status   #pseudotumor cerebri  - history of pseudotumor cerebri s/p LP under fluoro in 2020 with high opening pressure with MRI 2020 also showed possible pituitary mass but unclear because of pressure effect from pseudotumor cerebri causing partially empty sella. Considering to start acetazolamide but never started  - prolactin high (30), defer checking cortisol axis given already received steroids but should be investigated, ACTH (<1.5) low but should be suppressed iso steroids  - TSH normal but will check fT4 and T3 given c/f pituitary adenoma  -sedated with Dilaudid and propofol, will start precedex to assist with weaning Precedex   -will stop paralytics today   -PT/OT following     Cardiovascular  #HTN   #Atrial fibrillation   -Hx of a. fib not on AC   - echo from 2024 did not demonstrate elevation of pulmonary pressures, but now with severe PH suggesting chronicity on TTE at    - ROBERT 2/26 showing VTI 17  - TTE on 2/25 showed LV EF 61%, enlarged RV with TAPSE 2.1cm, ePASP at least 49 (Patient had 10/2024 TTE with normal LV and RV with ePASP 14).  - troponins initially elevated, peaked at 162 however downtrended   - s/p esmolol drip for high flows now D/C'd may need to consider again if need for higher flows  -hypertensive [id number]'s will start cardene gtt for now     Pulmonary  #Pulmonary HTN  #ARDS  #Multifocal Pneumonia   #BEA   - history of diagnosed BEA/?OHS intermittent compliance to home PAP  - c/f acute on suspected chronic pulmonary hypertension in setting of possible BEA/OHS c/b pneumonia, ARDS, and pulmonary edema   - CTA chest on 2/24 negative for pulmonary embolism, notable for patchy bilateral lower lobe opacities, hepatomegaly, mild ascites, edema/induration of the abdominal pannus.  - Full RVP and BAL cultures negative    - bronchoscopy demonstrated copious purulent secretions 2/26 and 2/27   - 20 mg dexamethasone x 5 days, then 10 mg and taper as per clinical status pneumonia/ARDS  - DuoNeb q6 hours + 3% saline will start IPV   -c/w PC/AC RR 14 PC: 14 PEEP:18 Fio2 50% ~250-300  - difficult glottic visualization due to large tongue upon intubation at [facility] 2/25    #ECMO  VV ECMO		  Cannulation sites/dates:  Flow: 5.5		P1: 217		Delta P: 40  RPM: 4065		P2: 178		SVO2: 76  Sweep: 5L/min:		FIO2: 1      ECMO Calculated CO: 7.28  DO2/VO2: 5.41  VO2/DO2: 0.18    -requiring high flows for oxygenation causing high delta P but D/V adequate, no lactate and SVO2 WNL but will watch closely   -Clinically perfusing. Lactate: 1.8  -ECMO sweep sighing q1hr   - Adjust circuit flow as tolerated with DO2:VO2 goal >2  -Monitor for hemolysis, chatter, evidence of recirculation, mixing      Gastrointestinal  #Diet   -tolerating tube feeds  - c/w bowel regimen   - hepatomegaly and mild ascites noted on CT A/P - no pocket to tap  -RUQ ultrasound with steatosis    - c/w bowel regimen with senna, Miralax and naloxegol, may need relistor if no BM    -triglycerides 223   -nutrition following       Gu/Renal  #ELLA  - ELLA - improving, likely ATN/vascular congestion  - good UO   -c/w intermittent diuresis with lasix may consider dose of metolazone today with next lasix     Infectious disease  #leukocytosis  #Pneumonia   #cellulitis   -s/p cefepime, linezolid and aztihro 2/25-2/26  -c/w zosyn and vanco (2/26-   ) trough due 2/27 @1400  - full RVP negative  - BCx x 2 2/24 currently NGTD, UCx 2/25 NGTD, tracheal aspirate Cx from 2/25 with normal commensal kyle   - strep pneumo Ag and urine legionella negative  - BAL cultures 2/26 currently NGTD   -? penile meatus yellow discharge pending results of GC/chamydia, HIV negative   - candidal intertrigo +/- cellulitis of the pannus - clotrimazole cream 1% BID 2/26 -+ abx as per above    Hematology/DVT ppx  #Anticoagulation   #Anemia   -c/w argatroban with goal 50-90  - mild anemia Hb 11s on admission down from 13 - check iron studies, retic, LDH, hapto  -s/p 1 u PRBC 2/26 for O2 delivery  - LE duplex 2/25 neg    Endocrine  #DM  -A1c 6.7   - obesity with metabolic syndrome  - diabetes with steroid induced hypoglycemia  - pituitary workup, steroids as above given recent weight gain but likely volume though  - c/w ISS and FS Q6h and adjust as needed     SKin/Lines  #Access  #Cellulitis   #DTI  - L subclavian TLC inserted 2/25   - L radial artery line 2/25  - RIJ ECMO cannula 2/26 at 20 cm  - R fem ECMO cannula 2/26 at 25 cm  - candidal intertrigo +/- cellulitis of the pannus - clotrimazole cream 1% BID 2/26 -+ abx as per above  -as per nursing staff, patient with ? DTI to right buttocks   -wound consult placed     GOC   -full code   -palliative involved  and following while on ECMO   -Mother involved in care and life partner

## 2025-02-27 NOTE — PROGRESS NOTE ADULT - SUBJECTIVE AND OBJECTIVE BOX
Interval Events:    HPI:  40 yo M w/ class III obesity, pAfib (no AC), HTN, BEA (on CPAP), history of b/l papilledema attributed to pseudotumor cerebri s/p LP in 2024 with very high opening pressure, who is transferred for VV ECMO for ARDS and severe hypoxia. Patient initially presented to [facility] on 2/24 for SOB and b/l LE edema. 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 He is a current every day smoker of Newports and marijuana, but does not drink or do other drugs. Currently lives with his mother. There is a long term partner in his life, but they are not , and they have an on/off relationship currently not very involved with each other as per the patient's mother and aunt.  At [facility] where he was getting an eval last year for shortness of breath, he had a sleep study and was diagnosed with sleep apnea, prescribed a PAP machine, which he has in his room but the mother is not sure how many nights per week he uses it. Recently, the last day or two, his mother and brother have been under the weather with URIs and the patient 2 days ago started to develop a ruynny nose, ough, some wheezing of the chest, as well as worsening shortness of breath and fevers at home. He called his aunt who told him to go get checked out and then he landed at the [facility] ED. Patient found to be reportedly hypoxemic to 70% on RA with worsening respiratory status/secretions and somnolence in the ED. Patient was intubated with difficulty (7 attempts) due to very large tongue secretions. Despite optimal vent management, patient remained hypoxemic. Unable to prone due to obesity. Patient cannulated for VV ECMO on 2/25 and transferred to [facility] for further management.     [facility] labs notable for MRSA PCR -, Fluvid -, urine legionella -, sputum gram stain  with GPC and GPR    CTA chest on 2/24 negative for pulmonary embolism, notable for patchy bilateral lower lobe opacities, hepatomegaly, mild ascites, edema/induration of the abdominal pannus.    LE duplex on 2/25 negative for DVT    TTE on 2/25 showed LV EF 61%, enlarged RV with TAPSE 2.1cm, ePASP at least 49 (Patient had 10/2024 TTE with normal LV and RV with ePASP 14).    Only home med is Lasix. Not on acetazolamide for pseudotumor or on Wegovy (was pending auth) (26 Feb 2025 01:48)      REVIEW OF SYSTEMS:  [ ] Unable to assess ROS because ________    OBJECTIVE:  ICU Vital Signs Last 24 Hrs  T(C): 37.3 (27 Feb 2025 04:00), Max: 37.4 (26 Feb 2025 20:00)  T(F): 99.1 (27 Feb 2025 04:00), Max: 99.3 (26 Feb 2025 20:00)  HR: 84 (27 Feb 2025 05:00) (76 - 94)  BP: --  BP(mean): --  ABP: 117/54 (27 Feb 2025 05:00) (99/45 - 125/66)  ABP(mean): 69 (27 Feb 2025 05:00) (60 - 270)  RR: 14 (27 Feb 2025 05:00) (13 - 15)  SpO2: 94% (27 Feb 2025 05:00) (90% - 99%)    O2 Parameters below as of 27 Feb 2025 05:00  Patient On (Oxygen Delivery Method): ventilator    O2 Concentration (%): 100      Mode: AC/ CMV (Assist Control/ Continuous Mandatory Ventilation), RR (machine): 14, FiO2: 100, PEEP: 18, ITime: 1, MAP: 21, PC: 12, PIP: 30    02-25 @ 07:01 - 02-26 @ 07:00  --------------------------------------------------------  IN: 1954.5 mL / OUT: 2520 mL / NET: -565.5 mL    02-26 @ 07:01 - 02-27 @ 06:09  --------------------------------------------------------  IN: 6173.9 mL / OUT: 6105 mL / NET: 68.9 mL      CAPILLARY BLOOD GLUCOSE      POCT Blood Glucose.: 146 mg/dL (27 Feb 2025 05:02)      Physical Exam:   Constitutional: ill appearing, no acute distress   HEENT: + PERRLA, EOMI, no drainage or redness  Neck: supple,  No JVD  Respiratory: Ventilator assisted breath Sounds equal & clear bilaterally to auscultation, no accessory muscle use noted  Cardiovascular: Regular rate, regular rhythm, normal S1, S2; no murmurs or rub  Gastrointestinal: Soft, non-tender, non distended, no hepatosplenomegaly, normal bowel sounds  Extremities: + 2 peripheral edema, no cyanosis, no clubbing   Vascular: Equal and normal pulses: 2+ peripheral pulses throughout  Neurological: sedated/intubated  Psychiatric: calm, normal mood, normal affect  Musculoskeletal: No joint swelling or deformity; no limitation of movement  Skin: warm, dry, well perfused, no rashes    HOSPITAL MEDICATIONS:  Standing Meds:  albuterol/ipratropium for Nebulization 3 milliLiter(s) Nebulizer every 6 hours  argatroban Infusion 1.1 MICROgram(s)/kG/Min IV Continuous <Continuous>  chlorhexidine 0.12% Liquid 15 milliLiter(s) Oral Mucosa every 12 hours  chlorhexidine 2% Cloths 1 Application(s) Topical <User Schedule>  cisatracurium Infusion 2 MICROgram(s)/kG/Min IV Continuous <Continuous>  clotrimazole 1% Cream 1 Application(s) Topical two times a day  dexAMETHasone  IVPB 20 milliGRAM(s) IV Intermittent daily  dextrose 50% Injectable 25 Gram(s) IV Push once  dextrose 50% Injectable 12.5 Gram(s) IV Push once  dextrose 50% Injectable 25 Gram(s) IV Push once  esmolol  Infusion 49.626 MICROgram(s)/kG/Min IV Continuous <Continuous>  glucagon  Injectable 1 milliGRAM(s) IntraMuscular once  HYDROmorphone Infusion. 1 mG/Hr IV Continuous <Continuous>  insulin lispro (ADMELOG) corrective regimen sliding scale   SubCutaneous every 6 hours  mupirocin 2% Ointment 1 Application(s) Topical two times a day  naloxegol 12.5 milliGRAM(s) Oral daily  norepinephrine Infusion 0.05 MICROgram(s)/kG/Min IV Continuous <Continuous>  pantoprazole  Injectable 40 milliGRAM(s) IV Push daily  petrolatum Ophthalmic Ointment 1 Application(s) Both EYES every 12 hours  piperacillin/tazobactam IVPB.. 3.375 Gram(s) IV Intermittent every 8 hours  polyethylene glycol 3350 17 Gram(s) Oral two times a day  propofol Infusion 50 MICROgram(s)/kG/Min IV Continuous <Continuous>  senna 2 Tablet(s) Oral at bedtime  sodium chloride 3%  Inhalation 4 milliLiter(s) Inhalation every 6 hours  vancomycin  IVPB      vancomycin  IVPB 1250 milliGRAM(s) IV Intermittent every 8 hours      PRN Meds:      LABS:                        10.9   22.79 )-----------( 131      ( 27 Feb 2025 04:50 )             37.6     Hgb Trend: 10.9<--, 10.6<--, 10.9<--, 11.2<--, 11.2<--  02-27    136  |  98  |  26[H]  ----------------------------<  160[H]  4.2   |  27  |  1.49[H]    Ca    7.5[L]      27 Feb 2025 00:10  Phos  5.0     02-27  Mg     2.10     02-27    TPro  5.6[L]  /  Alb  2.4[L]  /  TBili  1.0  /  DBili  x   /  AST  58[H]  /  ALT  56[H]  /  AlkPhos  51  02-27    Creatinine Trend: 1.49<--, 1.50<--, 1.48<--, 1.51<--, 1.53<--, 1.71<--  PT/INR - ( 27 Feb 2025 00:10 )   PT: 25.9 sec;   INR: 2.19 ratio         PTT - ( 27 Feb 2025 03:30 )  PTT:49.6 sec  Urinalysis Basic - ( 27 Feb 2025 00:10 )    Color: x / Appearance: x / SG: x / pH: x  Gluc: 160 mg/dL / Ketone: x  / Bili: x / Urobili: x   Blood: x / Protein: x / Nitrite: x   Leuk Esterase: x / RBC: x / WBC x   Sq Epi: x / Non Sq Epi: x / Bacteria: x      Arterial Blood Gas:  02-27 @ 04:50  7.41/52/97/33/97.5/7.1  ABG lactate: --  Arterial Blood Gas:  02-27 @ 00:10  7.41/51/107/32/98.0/6.5  ABG lactate: --  Arterial Blood Gas:  02-26 @ 20:00  7.48/44/110/33/99.5/8.3  ABG lactate: --  Arterial Blood Gas:  02-26 @ 17:41  7.46/46/85/33/96.6/7.8  ABG lactate: --  Arterial Blood Gas:  02-26 @ 13:20  7.52/42/112/34/96.3/10.4  ABG lactate: --  Arterial Blood Gas:  02-26 @ 11:15  7.49/42/97/32/98.3/7.9  ABG lactate: --  Arterial Blood Gas:  02-26 @ 04:25  7.48/44/64/33/90.7/8.3  ABG lactate: --  Arterial Blood Gas:  02-26 @ 02:20  7.51/41/90/33/98.0/8.9  ABG lactate: --  Arterial Blood Gas:  02-25 @ 23:36  7.44/44/151/30/99/5.0  ABG lactate: --  Arterial Blood Gas:  02-25 @ 21:52  7.34/54/49/29/83/2.2  ABG lactate: --  Arterial Blood Gas:  02-25 @ 20:57  7.36/52/58/29/91/2.8  ABG lactate: --  Arterial Blood Gas:  02-25 @ 18:54  7.36/57/58/32/91/5.1  ABG lactate: --  Arterial Blood Gas:  02-25 @ 16:14  7.38/57/57/34/91/6.8  ABG lactate: --  Arterial Blood Gas:  02-25 @ 13:11  7.27/75/67/34/93/4.9  ABG lactate: --  Arterial Blood Gas:  02-25 @ 06:15  7.39/56/103/34/99.0/7.1  ABG lactate: --        MICROBIOLOGY:     Culture - Acid Fast - Bronchial w/Smear (collected 26 Feb 2025 08:59)  Source: Bronchial Bronchial Lavage    Culture - Bronchial (collected 26 Feb 2025 08:59)  Source: Bronchial Bronchial Lavage  Gram Stain (26 Feb 2025 14:11):    Moderate polymorphonuclear leukocytes seen per low power field    No squamous epithelial cells seen per low power field    No organisms seen per oil power field    Culture - Legionella (collected 25 Feb 2025 12:44)  Source: Legionella SPUTUM  Preliminary Report (26 Feb 2025 14:56):    Culture in progress    Culture - Sputum (collected 25 Feb 2025 12:20)  Source: .Sputum Sputum  Gram Stain (25 Feb 2025 20:54):    Moderate polymorphonuclear leukocytes per low power field    No Squamous epithelial cells per low power field    Rare Gram Positive Cocci in Pairs and Chains per oil power field    Rare Gram Positive Rods per oil power field  Final Report (26 Feb 2025 22:39):    Commensal kyle consistent with body site    Culture - Urine (collected 25 Feb 2025 06:30)  Source: Catheterized Catheterized  Final Report (26 Feb 2025 08:38):    <10,000 CFU/mL Normal Urogenital Kyle    Urinalysis with Rflx Culture (collected 24 Feb 2025 21:49)    Culture - Blood (collected 24 Feb 2025 18:19)  Source: .Blood Blood-Peripheral  Preliminary Report (27 Feb 2025 01:02):    No growth at 48 Hours    Culture - Blood (collected 24 Feb 2025 18:19)  Source: .Blood Blood-Peripheral  Preliminary Report (27 Feb 2025 01:02):    No growth at 48 Hours      RADIOLOGY:  [ ] Reviewed and interpreted by me       Interval Events:  -patient net positive given lasix this AM  -improvement in blood gases       HPI:  38 yo M w/ class III obesity, pAfib (no AC), HTN, BEA (on CPAP), history of b/l papilledema attributed to pseudotumor cerebri s/p LP in 2024 with very high opening pressure, who is transferred for VV ECMO for ARDS and severe hypoxia. Patient initially presented to [place] on 2/24 for SOB and b/l LE edema. The patient's family endorses that he has had progressive leg swelling shortness of breath, dyspnea, and deconditioning for the past several months and had to take disability from his job at the [employer]. He is a current every day smoker of Newports and marijuana, but does not drink or do other drugs. Currently lives with his mother. There is a long term partner in his life, but they are not , and they have an on/off relationship currently not very involved with each other as per the patient's mother and aunt.  At [facility] where he was getting an eval last year for shortness of breath, he had a sleep study and was diagnosed with sleep apnea, prescribed a PAP machine, which he has in his room but the mother is not sure how many nights per week he uses it. Recently, the last day or two, his mother and brother have been under the weather with URIs and the patient 2 days ago started to develop a runny nose, ough, some wheezing of the chest, as well as worsening shortness of breath and fevers at home. He called his aunt who told him to go get checked out and then he landed at the [place] ED. Patient found to be reportedly hypoxemic to 70% on RA with worsening respiratory status/secretions and somnolence in the ED. Patient was intubated with difficulty (7 attempts) due to very large tongue secretions. Despite optimal vent management, patient remained hypoxemic. Unable to prone due to obesity. Patient cannulated for VV ECMO on 2/25 and transferred to [facility] for further management.     [place] labs notable for MRSA PCR -, Fluvid -, urine legionella -, sputum gram stain  with GPC and GPR    CTA chest on 2/24 negative for pulmonary embolism, notable for patchy bilateral lower lobe opacities, hepatomegaly, mild ascites, edema/induration of the abdominal pannus.    LE duplex on 2/25 negative for DVT    TTE on 2/25 showed LV EF 61%, enlarged RV with TAPSE 2.1cm, ePASP at least 49 (Patient had 10/2024 TTE with normal LV and RV with ePASP 14).    Only home med is Lasix. Not on acetazolamide for pseudotumor or on Wegovy (was pending auth) (26 Feb 2025 01:48)      REVIEW OF SYSTEMS:  [x ] Unable to assess ROS because patient is sedated/intubated     OBJECTIVE:  ICU Vital Signs Last 24 Hrs  T(C): 37.3 (27 Feb 2025 04:00), Max: 37.4 (26 Feb 2025 20:00)  T(F): 99.1 (27 Feb 2025 04:00), Max: 99.3 (26 Feb 2025 20:00)  HR: 84 (27 Feb 2025 05:00) (76 - 94)  BP: --  BP(mean): --  ABP: 117/54 (27 Feb 2025 05:00) (99/45 - 125/66)  ABP(mean): 69 (27 Feb 2025 05:00) (60 - 270)  RR: 14 (27 Feb 2025 05:00) (13 - 15)  SpO2: 94% (27 Feb 2025 05:00) (90% - 99%)    O2 Parameters below as of 27 Feb 2025 05:00  Patient On (Oxygen Delivery Method): ventilator    O2 Concentration (%): 100      Mode: AC/ CMV (Assist Control/ Continuous Mandatory Ventilation), RR (machine): 14, FiO2: 100, PEEP: 18, ITime: 1, MAP: 21, PC: 12, PIP: 30    02-25 @ 07:01  -  02-26 @ 07:00  --------------------------------------------------------  IN: 1954.5 mL / OUT: 2520 mL / NET: -565.5 mL    02-26 @ 07:01  -  02-27 @ 06:09  --------------------------------------------------------  IN: 6173.9 mL / OUT: 6105 mL / NET: 68.9 mL      CAPILLARY BLOOD GLUCOSE      POCT Blood Glucose.: 146 mg/dL (27 Feb 2025 05:02)      Physical Exam:   Constitutional: ill appearing, no acute distress   HEENT: + PERRLA, EOMI, no drainage or redness  Neck: supple,  No JVD, Right IJ ecmo cannula in place   Respiratory: Ventilator assisted breath Sounds diminished bilaterally to auscultation, no accessory muscle use noted  Cardiovascular: Regular rate, regular rhythm, normal S1, S2; no murmurs or rub  Gastrointestinal: Obese, soft, non distended, no hepatosplenomegaly, normal bowel sounds  Extremities: + 2 peripheral edema, no cyanosis, no clubbing   Vascular: Equal and normal pulses: 2+ peripheral pulses throughout  Neurological: sedated/intubated  Musculoskeletal: No joint swelling or deformity; no limitation of movement  Skin: warm, dry, well perfused, redness to pannus area    HOSPITAL MEDICATIONS:  Standing Meds:  albuterol/ipratropium for Nebulization 3 milliLiter(s) Nebulizer every 6 hours  argatroban Infusion 1.1 MICROgram(s)/kG/Min IV Continuous <Continuous>  chlorhexidine 0.12% Liquid 15 milliLiter(s) Oral Mucosa every 12 hours  chlorhexidine 2% Cloths 1 Application(s) Topical <User Schedule>  cisatracurium Infusion 2 MICROgram(s)/kG/Min IV Continuous <Continuous>  clotrimazole 1% Cream 1 Application(s) Topical two times a day  dexAMETHasone  IVPB 20 milliGRAM(s) IV Intermittent daily  dextrose 50% Injectable 25 Gram(s) IV Push once  dextrose 50% Injectable 12.5 Gram(s) IV Push once  dextrose 50% Injectable 25 Gram(s) IV Push once  esmolol  Infusion 49.626 MICROgram(s)/kG/Min IV Continuous <Continuous>  glucagon  Injectable 1 milliGRAM(s) IntraMuscular once  HYDROmorphone Infusion. 1 mG/Hr IV Continuous <Continuous>  insulin lispro (ADMELOG) corrective regimen sliding scale   SubCutaneous every 6 hours  mupirocin 2% Ointment 1 Application(s) Topical two times a day  naloxegol 12.5 milliGRAM(s) Oral daily  norepinephrine Infusion 0.05 MICROgram(s)/kG/Min IV Continuous <Continuous>  pantoprazole  Injectable 40 milliGRAM(s) IV Push daily  petrolatum Ophthalmic Ointment 1 Application(s) Both EYES every 12 hours  piperacillin/tazobactam IVPB.. 3.375 Gram(s) IV Intermittent every 8 hours  polyethylene glycol 3350 17 Gram(s) Oral two times a day  propofol Infusion 50 MICROgram(s)/kG/Min IV Continuous <Continuous>  senna 2 Tablet(s) Oral at bedtime  sodium chloride 3%  Inhalation 4 milliLiter(s) Inhalation every 6 hours  vancomycin  IVPB      vancomycin  IVPB 1250 milliGRAM(s) IV Intermittent every 8 hours      PRN Meds:      LABS:                        10.9   22.79 )-----------( 131      ( 27 Feb 2025 04:50 )             37.6     Hgb Trend: 10.9<--, 10.6<--, 10.9<--, 11.2<--, 11.2<--  02-27    136  |  98  |  26[H]  ----------------------------<  160[H]  4.2   |  27  |  1.49[H]    Ca    7.5[L]      27 Feb 2025 00:10  Phos  5.0     02-27  Mg     2.10     02-27    TPro  5.6[L]  /  Alb  2.4[L]  /  TBili  1.0  /  DBili  x   /  AST  58[H]  /  ALT  56[H]  /  AlkPhos  51  02-27    Creatinine Trend: 1.49<--, 1.50<--, 1.48<--, 1.51<--, 1.53<--, 1.71<--  PT/INR - ( 27 Feb 2025 00:10 )   PT: 25.9 sec;   INR: 2.19 ratio         PTT - ( 27 Feb 2025 03:30 )  PTT:49.6 sec  Urinalysis Basic - ( 27 Feb 2025 00:10 )    Color: x / Appearance: x / SG: x / pH: x  Gluc: 160 mg/dL / Ketone: x  / Bili: x / Urobili: x   Blood: x / Protein: x / Nitrite: x   Leuk Esterase: x / RBC: x / WBC x   Sq Epi: x / Non Sq Epi: x / Bacteria: x      Arterial Blood Gas:  02-27 @ 04:50  7.41/52/97/33/97.5/7.1  ABG lactate: --  Arterial Blood Gas:  02-27 @ 00:10  7.41/51/107/32/98.0/6.5  ABG lactate: --  Arterial Blood Gas:  02-26 @ 20:00  7.48/44/110/33/99.5/8.3  ABG lactate: --  Arterial Blood Gas:  02-26 @ 17:41  7.46/46/85/33/96.6/7.8  ABG lactate: --  Arterial Blood Gas:  02-26 @ 13:20  7.52/42/112/34/96.3/10.4  ABG lactate: --  Arterial Blood Gas:  02-26 @ 11:15  7.49/42/97/32/98.3/7.9  ABG lactate: --  Arterial Blood Gas:  02-26 @ 04:25  7.48/44/64/33/90.7/8.3  ABG lactate: --  Arterial Blood Gas:  02-26 @ 02:20  7.51/41/90/33/98.0/8.9  ABG lactate: --  Arterial Blood Gas:  02-25 @ 23:36  7.44/44/151/30/99/5.0  ABG lactate: --  Arterial Blood Gas:  02-25 @ 21:52  7.34/54/49/29/83/2.2  ABG lactate: --  Arterial Blood Gas:  02-25 @ 20:57  7.36/52/58/29/91/2.8  ABG lactate: --  Arterial Blood Gas:  02-25 @ 18:54  7.36/57/58/32/91/5.1  ABG lactate: --  Arterial Blood Gas:  02-25 @ 16:14  7.38/57/57/34/91/6.8  ABG lactate: --  Arterial Blood Gas:  02-25 @ 13:11  7.27/75/67/34/93/4.9  ABG lactate: --  Arterial Blood Gas:  02-25 @ 06:15  7.39/56/103/34/99.0/7.1  ABG lactate: --        MICROBIOLOGY:     Culture - Acid Fast - Bronchial w/Smear (collected 26 Feb 2025 08:59)  Source: Bronchial Bronchial Lavage    Culture - Bronchial (collected 26 Feb 2025 08:59)  Source: Bronchial Bronchial Lavage  Gram Stain (26 Feb 2025 14:11):    Moderate polymorphonuclear leukocytes seen per low power field    No squamous epithelial cells seen per low power field    No organisms seen per oil power field    Culture - Legionella (collected 25 Feb 2025 12:44)  Source: Legionella SPUTUM  Preliminary Report (26 Feb 2025 14:56):    Culture in progress    Culture - Sputum (collected 25 Feb 2025 12:20)  Source: .Sputum Sputum  Gram Stain (25 Feb 2025 20:54):    Moderate polymorphonuclear leukocytes per low power field    No Squamous epithelial cells per low power field    Rare Gram Positive Cocci in Pairs and Chains per oil power field    Rare Gram Positive Rods per oil power field  Final Report (26 Feb 2025 22:39):    Commensal kyle consistent with body site    Culture - Urine (collected 25 Feb 2025 06:30)  Source: Catheterized Catheterized  Final Report (26 Feb 2025 08:38):    <10,000 CFU/mL Normal Urogenital Kyle    Urinalysis with Rflx Culture (collected 24 Feb 2025 21:49)    Culture - Blood (collected 24 Feb 2025 18:19)  Source: .Blood Blood-Peripheral  Preliminary Report (27 Feb 2025 01:02):    No growth at 48 Hours    Culture - Blood (collected 24 Feb 2025 18:19)  Source: .Blood Blood-Peripheral  Preliminary Report (27 Feb 2025 01:02):    No growth at 48 Hours      RADIOLOGY:  [ ] Reviewed and interpreted by me       Interval Events:  -patient net positive given lasix this AM  -improvement in blood gases       HPI:  38 yo M w/ class III obesity, pAfib (no AC), HTN, BEA (on CPAP), history of b/l papilledema attributed to pseudotumor cerebri s/p LP in 2024 with very high opening pressure, who is transferred for VV ECMO for ARDS and severe hypoxia. Patient initially presented to [place] on 2/24 for SOB and b/l LE edema. The patient's family endorses that he has had progressive leg swelling shortness of breath, dyspnea, and deconditioning for the past several months and had to take disability from his job at the [employer]. He is a current every day smoker of Newports and marijuana, but does not drink or do other drugs. Currently lives with his mother. There is a long term partner in his life, but they are not , and they have an on/off relationship currently not very involved with each other as per the patient's mother and aunt.  At [facility] where he was getting an eval last year for shortness of breath, he had a sleep study and was diagnosed with sleep apnea, prescribed a PAP machine, which he has in his room but the mother is not sure how many nights per week he uses it. Recently, the last day or two, his mother and brother have been under the weather with URIs and the patient 2 days ago started to develop a runny nose, ough, some wheezing of the chest, as well as worsening shortness of breath and fevers at home. He called his aunt who told him to go get checked out and then he landed at the [place] ED. Patient found to be reportedly hypoxemic to 70% on RA with worsening respiratory status/secretions and somnolence in the ED. Patient was intubated with difficulty (7 attempts) due to very large tongue secretions. Despite optimal vent management, patient remained hypoxemic. Unable to prone due to obesity. Patient cannulated for VV ECMO on 2/25 and transferred to [facility] for further management.     [place] labs notable for MRSA PCR -, Fluvid -, urine legionella -, sputum gram stain  with GPC and GPR    CTA chest on 2/24 negative for pulmonary embolism, notable for patchy bilateral lower lobe opacities, hepatomegaly, mild ascites, edema/induration of the abdominal pannus.    LE duplex on 2/25 negative for DVT    TTE on 2/25 showed LV EF 61%, enlarged RV with TAPSE 2.1cm, ePASP at least 49 (Patient had 10/2024 TTE with normal LV and RV with ePASP 14).    Only home med is Lasix. Not on acetazolamide for pseudotumor or on Wegovy (was pending auth) (26 Feb 2025 01:48)      REVIEW OF SYSTEMS:  [x ] Unable to assess ROS because patient is sedated/intubated     OBJECTIVE:  ICU Vital Signs Last 24 Hrs  T(C): 37.3 (27 Feb 2025 04:00), Max: 37.4 (26 Feb 2025 20:00)  T(F): 99.1 (27 Feb 2025 04:00), Max: 99.3 (26 Feb 2025 20:00)  HR: 84 (27 Feb 2025 05:00) (76 - 94)  BP: --  BP(mean): --  ABP: 117/54 (27 Feb 2025 05:00) (99/45 - 125/66)  ABP(mean): 69 (27 Feb 2025 05:00) (60 - 270)  RR: 14 (27 Feb 2025 05:00) (13 - 15)  SpO2: 94% (27 Feb 2025 05:00) (90% - 99%)    O2 Parameters below as of 27 Feb 2025 05:00  Patient On (Oxygen Delivery Method): ventilator    O2 Concentration (%): 100      Mode: AC/ CMV (Assist Control/ Continuous Mandatory Ventilation), RR (machine): 14, FiO2: 100, PEEP: 18, ITime: 1, MAP: 21, PC: 12, PIP: 30    02-25 @ 07:01  -  02-26 @ 07:00  --------------------------------------------------------  IN: 1954.5 mL / OUT: 2520 mL / NET: -565.5 mL    02-26 @ 07:01  -  02-27 @ 06:09  --------------------------------------------------------  IN: 6173.9 mL / OUT: 6105 mL / NET: 68.9 mL      CAPILLARY BLOOD GLUCOSE      POCT Blood Glucose.: 146 mg/dL (27 Feb 2025 05:02)      Physical Exam:   Constitutional: ill appearing, no acute distress   HEENT: + PERRLA, EOMI, no drainage or redness  Neck: supple,  No JVD, Right IJ ecmo cannula in place   Respiratory: Ventilator assisted breath Sounds diminished bilaterally to auscultation, no accessory muscle use noted  Cardiovascular: Regular rate, regular rhythm, normal S1, S2; no murmurs or rub  Gastrointestinal: Obese, soft, non distended, no hepatosplenomegaly, normal bowel sounds  Extremities: + 2 peripheral edema, no cyanosis, no clubbing   Vascular: Equal and normal pulses: 2+ peripheral pulses throughout  Neurological: sedated/intubated  Musculoskeletal: No joint swelling or deformity; no limitation of movement  Skin: warm, dry, well perfused, cellulitis to pannus area, DTI to right buttocks     HOSPITAL MEDICATIONS:  Standing Meds:  albuterol/ipratropium for Nebulization 3 milliLiter(s) Nebulizer every 6 hours  argatroban Infusion 1.1 MICROgram(s)/kG/Min IV Continuous <Continuous>  chlorhexidine 0.12% Liquid 15 milliLiter(s) Oral Mucosa every 12 hours  chlorhexidine 2% Cloths 1 Application(s) Topical <User Schedule>  cisatracurium Infusion 2 MICROgram(s)/kG/Min IV Continuous <Continuous>  clotrimazole 1% Cream 1 Application(s) Topical two times a day  dexAMETHasone  IVPB 20 milliGRAM(s) IV Intermittent daily  dextrose 50% Injectable 25 Gram(s) IV Push once  dextrose 50% Injectable 12.5 Gram(s) IV Push once  dextrose 50% Injectable 25 Gram(s) IV Push once  esmolol  Infusion 49.626 MICROgram(s)/kG/Min IV Continuous <Continuous>  glucagon  Injectable 1 milliGRAM(s) IntraMuscular once  HYDROmorphone Infusion. 1 mG/Hr IV Continuous <Continuous>  insulin lispro (ADMELOG) corrective regimen sliding scale   SubCutaneous every 6 hours  mupirocin 2% Ointment 1 Application(s) Topical two times a day  naloxegol 12.5 milliGRAM(s) Oral daily  norepinephrine Infusion 0.05 MICROgram(s)/kG/Min IV Continuous <Continuous>  pantoprazole  Injectable 40 milliGRAM(s) IV Push daily  petrolatum Ophthalmic Ointment 1 Application(s) Both EYES every 12 hours  piperacillin/tazobactam IVPB.. 3.375 Gram(s) IV Intermittent every 8 hours  polyethylene glycol 3350 17 Gram(s) Oral two times a day  propofol Infusion 50 MICROgram(s)/kG/Min IV Continuous <Continuous>  senna 2 Tablet(s) Oral at bedtime  sodium chloride 3%  Inhalation 4 milliLiter(s) Inhalation every 6 hours  vancomycin  IVPB      vancomycin  IVPB 1250 milliGRAM(s) IV Intermittent every 8 hours      PRN Meds:      LABS:                        10.9   22.79 )-----------( 131      ( 27 Feb 2025 04:50 )             37.6     Hgb Trend: 10.9<--, 10.6<--, 10.9<--, 11.2<--, 11.2<--  02-27    136  |  98  |  26[H]  ----------------------------<  160[H]  4.2   |  27  |  1.49[H]    Ca    7.5[L]      27 Feb 2025 00:10  Phos  5.0     02-27  Mg     2.10     02-27    TPro  5.6[L]  /  Alb  2.4[L]  /  TBili  1.0  /  DBili  x   /  AST  58[H]  /  ALT  56[H]  /  AlkPhos  51  02-27    Creatinine Trend: 1.49<--, 1.50<--, 1.48<--, 1.51<--, 1.53<--, 1.71<--  PT/INR - ( 27 Feb 2025 00:10 )   PT: 25.9 sec;   INR: 2.19 ratio         PTT - ( 27 Feb 2025 03:30 )  PTT:49.6 sec  Urinalysis Basic - ( 27 Feb 2025 00:10 )    Color: x / Appearance: x / SG: x / pH: x  Gluc: 160 mg/dL / Ketone: x  / Bili: x / Urobili: x   Blood: x / Protein: x / Nitrite: x   Leuk Esterase: x / RBC: x / WBC x   Sq Epi: x / Non Sq Epi: x / Bacteria: x      Arterial Blood Gas:  02-27 @ 04:50  7.41/52/97/33/97.5/7.1  ABG lactate: --  Arterial Blood Gas:  02-27 @ 00:10  7.41/51/107/32/98.0/6.5  ABG lactate: --  Arterial Blood Gas:  02-26 @ 20:00  7.48/44/110/33/99.5/8.3  ABG lactate: --  Arterial Blood Gas:  02-26 @ 17:41  7.46/46/85/33/96.6/7.8  ABG lactate: --  Arterial Blood Gas:  02-26 @ 13:20  7.52/42/112/34/96.3/10.4  ABG lactate: --  Arterial Blood Gas:  02-26 @ 11:15  7.49/42/97/32/98.3/7.9  ABG lactate: --  Arterial Blood Gas:  02-26 @ 04:25  7.48/44/64/33/90.7/8.3  ABG lactate: --  Arterial Blood Gas:  02-26 @ 02:20  7.51/41/90/33/98.0/8.9  ABG lactate: --  Arterial Blood Gas:  02-25 @ 23:36  7.44/44/151/30/99/5.0  ABG lactate: --  Arterial Blood Gas:  02-25 @ 21:52  7.34/54/49/29/83/2.2  ABG lactate: --  Arterial Blood Gas:  02-25 @ 20:57  7.36/52/58/29/91/2.8  ABG lactate: --  Arterial Blood Gas:  02-25 @ 18:54  7.36/57/58/32/91/5.1  ABG lactate: --  Arterial Blood Gas:  02-25 @ 16:14  7.38/57/57/34/91/6.8  ABG lactate: --  Arterial Blood Gas:  02-25 @ 13:11  7.27/75/67/34/93/4.9  ABG lactate: --  Arterial Blood Gas:  02-25 @ 06:15  7.39/56/103/34/99.0/7.1  ABG lactate: --        MICROBIOLOGY:     Culture - Acid Fast - Bronchial w/Smear (collected 26 Feb 2025 08:59)  Source: Bronchial Bronchial Lavage    Culture - Bronchial (collected 26 Feb 2025 08:59)  Source: Bronchial Bronchial Lavage  Gram Stain (26 Feb 2025 14:11):    Moderate polymorphonuclear leukocytes seen per low power field    No squamous epithelial cells seen per low power field    No organisms seen per oil power field    Culture - Legionella (collected 25 Feb 2025 12:44)  Source: Legionella SPUTUM  Preliminary Report (26 Feb 2025 14:56):    Culture in progress    Culture - Sputum (collected 25 Feb 2025 12:20)  Source: .Sputum Sputum  Gram Stain (25 Feb 2025 20:54):    Moderate polymorphonuclear leukocytes per low power field    No Squamous epithelial cells per low power field    Rare Gram Positive Cocci in Pairs and Chains per oil power field    Rare Gram Positive Rods per oil power field  Final Report (26 Feb 2025 22:39):    Commensal kyle consistent with body site    Culture - Urine (collected 25 Feb 2025 06:30)  Source: Catheterized Catheterized  Final Report (26 Feb 2025 08:38):    <10,000 CFU/mL Normal Urogenital Kyle    Urinalysis with Rflx Culture (collected 24 Feb 2025 21:49)    Culture - Blood (collected 24 Feb 2025 18:19)  Source: .Blood Blood-Peripheral  Preliminary Report (27 Feb 2025 01:02):    No growth at 48 Hours    Culture - Blood (collected 24 Feb 2025 18:19)  Source: .Blood Blood-Peripheral  Preliminary Report (27 Feb 2025 01:02):    No growth at 48 Hours      RADIOLOGY:  [ ] Reviewed and interpreted by me

## 2025-02-27 NOTE — PROCEDURE NOTE - NSBRONCHPROCDETAILS_GEN_A_CORE_FT
Indication: ARDS    Operators: Chance Bello    Pre-op Dx: pneumonia    Preop medications: hydromorphone, propofol    Findings:  Bronchoscope inserted through ETT. ETT noted to be in good position. Airway evaluation reveale.  Initial mucosal impression of boggy erythematous mucosa, improved from prior. Thick, white secretions through bronchial tree, however density/thickness improved from prior. Old blood visualized LLL c/w prior known aspiration 2/2 traumatic intubation without active bleeding, mixed with secretions. Therapeutically aspirated with distal clearance to the basal segments of the LLL, with clean return of 20 ml of lavaged saline to this area. All other secretions therapeutically aspirated. Bronchoscope then withdrawn from ETT.    Indication: ARDS    Operators: Chance Bello    Pre-op Dx: pneumonia    Preop medications: hydromorphone, propofol    Findings:  Bronchoscope inserted through ETT. ETT noted to be in good position. Edematous and erythematous mucosa improved from prior exam. Thick, white secretions present throughout the bronchial tree, however improved from prior exam. Old blood visualized in the left lower lobe mixed with secretions. No active bleeding. Secretions therapeutically aspirated. Bronchoscope then withdrawn from ETT.

## 2025-02-27 NOTE — PROGRESS NOTE ADULT - CRITICAL CARE ATTENDING COMMENT
39 year old male with Class 3 obesity, pAF, HTN, BEA, and history of papilledema attributed to pseudotumor cerebri transferred here from [facility] 2/24/25 for VV-ECMO. He presented with acute hypoxemic respiratory failure and septic shock due to suspected multifocal pneumonia. There is a component of volume overload and right ventricular dysfunction as well. He was intubated 2/25/25 for development of severe ARDS.    NEURO: Sedated with propofol and hydromorphone. Cisatracurium stopped today.  CVS: Not requiring vasopressors. Esmolol discontinued. Continue diuresis today as he remains hypervolemic.  PULM: VVECMO Support: FiO2 1, Sweep 5, Flow 5.4. Ventilator settings: Pressure AC Pinsp 12 PEEP 16 Rate 12, FiO2 100. TV approximately 250-350 cc.  GI: NPO. Continue enteral feeds. Follow up with nutrition to optimize. Bowel regimen.  RENAL: Acute kidney injury. Creatinine improving. Plan to diurese as above.  ENDO: RAISS  ID: Empiric vancomycin and zosyn. Follow up culture results.  HEME: Transfused 1 unit overnight 2/26 to optimize oxygenation. No evidence of bleeding.  PPX: Argatroban while on ECMO.  Full Code  Prognosis guarded.    Total time spent on VV ECMO management was 20 minutes, separate from time spent on critical care management, procedures, and teaching.

## 2025-02-27 NOTE — CHART NOTE - NSCHARTNOTEFT_GEN_A_CORE
: Chance    INDICATION: ARDS    PROCEDURE:  [x] LIMITED ECHO  [ ] LIMITED CHEST  [ ] LIMITED RETROPERITONEAL  [ ] LIMITED ABDOMINAL  [ ] LIMITED DVT  [ ] NEEDLE GUIDANCE VASCULAR  [ ] NEEDLE GUIDANCE THORACENTESIS  [ ] NEEDLE GUIDANCE PARACENTESIS  [ ] NEEDLE GUIDANCE PERICARDIOCENTESIS  [ ] OTHER    FINDINGS/INTERPRETATION: Interventricular flattening in systole and diastole toward the LV throughout cardiac cycle c/w pressure and volume overload. LV systolic function normal. Mildly enlarged RV and RA. VTI ~13-14 cm equating to a cardiac output of 5.8 L/min with a previously measured LVOT diameter of 2.4 cm @ HR 95 bpm.    Images in QPath : Chance    INDICATION: ARDS    PROCEDURE:  [x] LIMITED ECHO  [ ] LIMITED CHEST  [ ] LIMITED RETROPERITONEAL  [ ] LIMITED ABDOMINAL  [ ] LIMITED DVT  [ ] NEEDLE GUIDANCE VASCULAR  [ ] NEEDLE GUIDANCE THORACENTESIS  [ ] NEEDLE GUIDANCE PARACENTESIS  [ ] NEEDLE GUIDANCE PERICARDIOCENTESIS  [ ] OTHER    FINDINGS/INTERPRETATION: Interventricular flattening in systole and diastole toward the LV throughout cardiac cycle c/w pressure and volume overload. LV systolic function normal. Mildly enlarged RV and RA. VTI ~13-14 cm equating to a cardiac output of 5.8 L/min with a previously measured LVOT diameter of 2.4 cm @ HR 95 bpm, however with somewhat suboptimal angle of doppler measurement due to mildly technically difficult study limited by body habitus    Images in QPath : Chance    INDICATION: ARDS    PROCEDURE:  [x] LIMITED ECHO  [ ] LIMITED CHEST  [ ] LIMITED RETROPERITONEAL  [ ] LIMITED ABDOMINAL  [ ] LIMITED DVT  [ ] NEEDLE GUIDANCE VASCULAR  [ ] NEEDLE GUIDANCE THORACENTESIS  [ ] NEEDLE GUIDANCE PARACENTESIS  [ ] NEEDLE GUIDANCE PERICARDIOCENTESIS  [ ] OTHER    FINDINGS/INTERPRETATION: Interventricular flattening in systole and diastole toward the LV throughout cardiac cycle c/w pressure and volume overload. LV systolic function normal. Mildly enlarged RV and RA. VTI ~13-14 cm equating to a cardiac output of 5.8 L/min with a previously measured LVOT diameter of 2.4 cm @ HR 95 bpm, however with somewhat suboptimal angle of Doppler measurement due to technically difficult study limited by body habitus.    Images in QPath      Attending Attestation:    I was present during the key portions of the procedure and immediately available during the entire procedure.    Judd Rice MD  Attending  Pulmonary & Critical Care Medicine

## 2025-02-28 LAB
ALBUMIN SERPL ELPH-MCNC: 2.4 G/DL — LOW (ref 3.3–5)
ALBUMIN SERPL ELPH-MCNC: 2.6 G/DL — LOW (ref 3.3–5)
ALBUMIN SERPL ELPH-MCNC: 2.6 G/DL — LOW (ref 3.3–5)
ALBUMIN SERPL ELPH-MCNC: 2.7 G/DL — LOW (ref 3.3–5)
ALP SERPL-CCNC: 55 U/L — SIGNIFICANT CHANGE UP (ref 40–120)
ALP SERPL-CCNC: 58 U/L — SIGNIFICANT CHANGE UP (ref 40–120)
ALP SERPL-CCNC: 59 U/L — SIGNIFICANT CHANGE UP (ref 40–120)
ALP SERPL-CCNC: 61 U/L — SIGNIFICANT CHANGE UP (ref 40–120)
ALT FLD-CCNC: 65 U/L — HIGH (ref 4–41)
ALT FLD-CCNC: 65 U/L — HIGH (ref 4–41)
ALT FLD-CCNC: 66 U/L — HIGH (ref 4–41)
ALT FLD-CCNC: 66 U/L — HIGH (ref 4–41)
ANION GAP SERPL CALC-SCNC: 11 MMOL/L — SIGNIFICANT CHANGE UP (ref 7–14)
ANION GAP SERPL CALC-SCNC: 13 MMOL/L — SIGNIFICANT CHANGE UP (ref 7–14)
ANION GAP SERPL CALC-SCNC: 8 MMOL/L — SIGNIFICANT CHANGE UP (ref 7–14)
ANION GAP SERPL CALC-SCNC: 9 MMOL/L — SIGNIFICANT CHANGE UP (ref 7–14)
ANISOCYTOSIS BLD QL: SLIGHT — SIGNIFICANT CHANGE UP
APTT BLD: 56.9 SEC — HIGH (ref 24.5–35.6)
APTT BLD: 59.5 SEC — HIGH (ref 24.5–35.6)
APTT BLD: 62.1 SEC — HIGH (ref 24.5–35.6)
APTT BLD: 63.2 SEC — HIGH (ref 24.5–35.6)
AST SERPL-CCNC: 116 U/L — HIGH (ref 4–40)
AST SERPL-CCNC: 117 U/L — HIGH (ref 4–40)
AST SERPL-CCNC: 129 U/L — HIGH (ref 4–40)
AST SERPL-CCNC: 133 U/L — HIGH (ref 4–40)
BASOPHILS # BLD AUTO: 0 K/UL — SIGNIFICANT CHANGE UP (ref 0–0.2)
BASOPHILS # BLD AUTO: 0.01 K/UL — SIGNIFICANT CHANGE UP (ref 0–0.2)
BASOPHILS # BLD AUTO: 0.02 K/UL — SIGNIFICANT CHANGE UP (ref 0–0.2)
BASOPHILS # BLD AUTO: 0.03 K/UL — SIGNIFICANT CHANGE UP (ref 0–0.2)
BASOPHILS NFR BLD AUTO: 0 % — SIGNIFICANT CHANGE UP (ref 0–2)
BASOPHILS NFR BLD AUTO: 0.1 % — SIGNIFICANT CHANGE UP (ref 0–2)
BASOPHILS NFR BLD AUTO: 0.1 % — SIGNIFICANT CHANGE UP (ref 0–2)
BASOPHILS NFR BLD AUTO: 0.2 % — SIGNIFICANT CHANGE UP (ref 0–2)
BILIRUB SERPL-MCNC: 1.2 MG/DL — SIGNIFICANT CHANGE UP (ref 0.2–1.2)
BILIRUB SERPL-MCNC: 1.4 MG/DL — HIGH (ref 0.2–1.2)
BLOOD GAS ARTERIAL - LYTES,HGB,ICA,LACT RESULT: SIGNIFICANT CHANGE UP
BLOOD GAS ECMO POST MEMBRANE - ARTERIAL RESULT: SIGNIFICANT CHANGE UP
BLOOD GAS ECMO PRE MEMBRANE - VENOUS RESULT: SIGNIFICANT CHANGE UP
BUN SERPL-MCNC: 25 MG/DL — HIGH (ref 7–23)
BUN SERPL-MCNC: 27 MG/DL — HIGH (ref 7–23)
BUN SERPL-MCNC: 29 MG/DL — HIGH (ref 7–23)
BUN SERPL-MCNC: 30 MG/DL — HIGH (ref 7–23)
CA-I BLD-SCNC: 1.01 MMOL/L — LOW (ref 1.15–1.29)
CA-I BLD-SCNC: 1.06 MMOL/L — LOW (ref 1.15–1.29)
CA-I BLD-SCNC: 1.1 MMOL/L — LOW (ref 1.15–1.29)
CALCIUM SERPL-MCNC: 8 MG/DL — LOW (ref 8.4–10.5)
CALCIUM SERPL-MCNC: 8 MG/DL — LOW (ref 8.4–10.5)
CALCIUM SERPL-MCNC: 8.1 MG/DL — LOW (ref 8.4–10.5)
CALCIUM SERPL-MCNC: 8.2 MG/DL — LOW (ref 8.4–10.5)
CHLORIDE SERPL-SCNC: 103 MMOL/L — SIGNIFICANT CHANGE UP (ref 98–107)
CHLORIDE SERPL-SCNC: 104 MMOL/L — SIGNIFICANT CHANGE UP (ref 98–107)
CHLORIDE SERPL-SCNC: 105 MMOL/L — SIGNIFICANT CHANGE UP (ref 98–107)
CHLORIDE SERPL-SCNC: 105 MMOL/L — SIGNIFICANT CHANGE UP (ref 98–107)
CO2 SERPL-SCNC: 25 MMOL/L — SIGNIFICANT CHANGE UP (ref 22–31)
CO2 SERPL-SCNC: 27 MMOL/L — SIGNIFICANT CHANGE UP (ref 22–31)
CO2 SERPL-SCNC: 27 MMOL/L — SIGNIFICANT CHANGE UP (ref 22–31)
CO2 SERPL-SCNC: 28 MMOL/L — SIGNIFICANT CHANGE UP (ref 22–31)
CREAT SERPL-MCNC: 0.86 MG/DL — SIGNIFICANT CHANGE UP (ref 0.5–1.3)
CREAT SERPL-MCNC: 0.9 MG/DL — SIGNIFICANT CHANGE UP (ref 0.5–1.3)
CREAT SERPL-MCNC: 0.92 MG/DL — SIGNIFICANT CHANGE UP (ref 0.5–1.3)
CREAT SERPL-MCNC: 0.97 MG/DL — SIGNIFICANT CHANGE UP (ref 0.5–1.3)
EGFR: 103 ML/MIN/1.73M2 — SIGNIFICANT CHANGE UP
EGFR: 103 ML/MIN/1.73M2 — SIGNIFICANT CHANGE UP
EGFR: 110 ML/MIN/1.73M2 — SIGNIFICANT CHANGE UP
EGFR: 110 ML/MIN/1.73M2 — SIGNIFICANT CHANGE UP
EGFR: 113 ML/MIN/1.73M2 — SIGNIFICANT CHANGE UP
EGFR: 113 ML/MIN/1.73M2 — SIGNIFICANT CHANGE UP
EGFR: 114 ML/MIN/1.73M2 — SIGNIFICANT CHANGE UP
EGFR: 114 ML/MIN/1.73M2 — SIGNIFICANT CHANGE UP
EGFR: 90 ML/MIN/1.73M2 — SIGNIFICANT CHANGE UP
EGFR: 90 ML/MIN/1.73M2 — SIGNIFICANT CHANGE UP
EOSINOPHIL # BLD AUTO: 0 K/UL — SIGNIFICANT CHANGE UP (ref 0–0.5)
EOSINOPHIL NFR BLD AUTO: 0 % — SIGNIFICANT CHANGE UP (ref 0–6)
GIANT PLATELETS BLD QL SMEAR: PRESENT — SIGNIFICANT CHANGE UP
GLUCOSE BLDC GLUCOMTR-MCNC: 162 MG/DL — HIGH (ref 70–99)
GLUCOSE BLDC GLUCOMTR-MCNC: 191 MG/DL — HIGH (ref 70–99)
GLUCOSE BLDC GLUCOMTR-MCNC: 198 MG/DL — HIGH (ref 70–99)
GLUCOSE SERPL-MCNC: 170 MG/DL — HIGH (ref 70–99)
GLUCOSE SERPL-MCNC: 181 MG/DL — HIGH (ref 70–99)
GLUCOSE SERPL-MCNC: 211 MG/DL — HIGH (ref 70–99)
GLUCOSE SERPL-MCNC: 212 MG/DL — HIGH (ref 70–99)
HAPTOGLOB SERPL-MCNC: <20 MG/DL — LOW (ref 34–200)
HCT VFR BLD CALC: 42.4 % — SIGNIFICANT CHANGE UP (ref 39–50)
HCT VFR BLD CALC: 43.8 % — SIGNIFICANT CHANGE UP (ref 39–50)
HCT VFR BLD CALC: 44.2 % — SIGNIFICANT CHANGE UP (ref 39–50)
HCT VFR BLD CALC: 44.4 % — SIGNIFICANT CHANGE UP (ref 39–50)
HERPES SIMPLEX SPECIMEN SOURCE: SIGNIFICANT CHANGE UP
HGB BLD-MCNC: 12.4 G/DL — LOW (ref 13–17)
HGB BLD-MCNC: 12.5 G/DL — LOW (ref 13–17)
HGB BLD-MCNC: 12.5 G/DL — LOW (ref 13–17)
HGB BLD-MCNC: 12.6 G/DL — LOW (ref 13–17)
HSV1 DNA SPEC QL NAA+PROBE: SIGNIFICANT CHANGE UP
HSV2 DNA SPEC QL NAA+PROBE: SIGNIFICANT CHANGE UP
IANC: 17.46 K/UL — HIGH (ref 1.8–7.4)
IANC: 17.5 K/UL — HIGH (ref 1.8–7.4)
IANC: 17.82 K/UL — HIGH (ref 1.8–7.4)
IANC: 18.25 K/UL — HIGH (ref 1.8–7.4)
IMM GRANULOCYTES NFR BLD AUTO: 0.8 % — SIGNIFICANT CHANGE UP (ref 0–0.9)
IMM GRANULOCYTES NFR BLD AUTO: 1 % — HIGH (ref 0–0.9)
IMM GRANULOCYTES NFR BLD AUTO: 2.1 % — HIGH (ref 0–0.9)
INR BLD: 2.36 RATIO — HIGH (ref 0.85–1.16)
INR BLD: 2.39 RATIO — HIGH (ref 0.85–1.16)
INR BLD: 2.42 RATIO — HIGH (ref 0.85–1.16)
INR BLD: 2.43 RATIO — HIGH (ref 0.85–1.16)
LDH SERPL L TO P-CCNC: 624 U/L — HIGH (ref 135–225)
LDH SERPL L TO P-CCNC: 631 U/L — HIGH (ref 135–225)
LYMPHOCYTES # BLD AUTO: 0 % — LOW (ref 13–44)
LYMPHOCYTES # BLD AUTO: 0 K/UL — LOW (ref 1–3.3)
LYMPHOCYTES # BLD AUTO: 0.32 K/UL — LOW (ref 1–3.3)
LYMPHOCYTES # BLD AUTO: 0.45 K/UL — LOW (ref 1–3.3)
LYMPHOCYTES # BLD AUTO: 0.63 K/UL — LOW (ref 1–3.3)
LYMPHOCYTES # BLD AUTO: 1.7 % — LOW (ref 13–44)
LYMPHOCYTES # BLD AUTO: 2.3 % — LOW (ref 13–44)
LYMPHOCYTES # BLD AUTO: 3.1 % — LOW (ref 13–44)
MAGNESIUM SERPL-MCNC: 2 MG/DL — SIGNIFICANT CHANGE UP (ref 1.6–2.6)
MAGNESIUM SERPL-MCNC: 2.1 MG/DL — SIGNIFICANT CHANGE UP (ref 1.6–2.6)
MCHC RBC-ENTMCNC: 22.6 PG — LOW (ref 27–34)
MCHC RBC-ENTMCNC: 22.7 PG — LOW (ref 27–34)
MCHC RBC-ENTMCNC: 22.7 PG — LOW (ref 27–34)
MCHC RBC-ENTMCNC: 23 PG — LOW (ref 27–34)
MCHC RBC-ENTMCNC: 28.2 G/DL — LOW (ref 32–36)
MCHC RBC-ENTMCNC: 28.5 G/DL — LOW (ref 32–36)
MCHC RBC-ENTMCNC: 28.5 G/DL — LOW (ref 32–36)
MCHC RBC-ENTMCNC: 29.2 G/DL — LOW (ref 32–36)
MCV RBC AUTO: 78.8 FL — LOW (ref 80–100)
MCV RBC AUTO: 79.6 FL — LOW (ref 80–100)
MCV RBC AUTO: 79.6 FL — LOW (ref 80–100)
MCV RBC AUTO: 80.1 FL — SIGNIFICANT CHANGE UP (ref 80–100)
MONOCYTES # BLD AUTO: 0.97 K/UL — HIGH (ref 0–0.9)
MONOCYTES # BLD AUTO: 1.13 K/UL — HIGH (ref 0–0.9)
MONOCYTES # BLD AUTO: 1.16 K/UL — HIGH (ref 0–0.9)
MONOCYTES # BLD AUTO: 1.62 K/UL — HIGH (ref 0–0.9)
MONOCYTES NFR BLD AUTO: 5.1 % — SIGNIFICANT CHANGE UP (ref 2–14)
MONOCYTES NFR BLD AUTO: 5.8 % — SIGNIFICANT CHANGE UP (ref 2–14)
MONOCYTES NFR BLD AUTO: 5.9 % — SIGNIFICANT CHANGE UP (ref 2–14)
MONOCYTES NFR BLD AUTO: 8 % — SIGNIFICANT CHANGE UP (ref 2–14)
NEUTROPHILS # BLD AUTO: 17.46 K/UL — HIGH (ref 1.8–7.4)
NEUTROPHILS # BLD AUTO: 17.5 K/UL — HIGH (ref 1.8–7.4)
NEUTROPHILS # BLD AUTO: 17.82 K/UL — HIGH (ref 1.8–7.4)
NEUTROPHILS # BLD AUTO: 18.59 K/UL — HIGH (ref 1.8–7.4)
NEUTROPHILS NFR BLD AUTO: 88.9 % — HIGH (ref 43–77)
NEUTROPHILS NFR BLD AUTO: 90.7 % — HIGH (ref 43–77)
NEUTROPHILS NFR BLD AUTO: 92 % — HIGH (ref 43–77)
NEUTROPHILS NFR BLD AUTO: 92.2 % — HIGH (ref 43–77)
NRBC # BLD AUTO: 0.04 K/UL — HIGH (ref 0–0)
NRBC # BLD AUTO: 0.04 K/UL — HIGH (ref 0–0)
NRBC # BLD AUTO: 0.05 K/UL — HIGH (ref 0–0)
NRBC # BLD: 1 /100 WBCS — HIGH (ref 0–0)
NRBC # FLD: 0.04 K/UL — HIGH (ref 0–0)
NRBC # FLD: 0.04 K/UL — HIGH (ref 0–0)
NRBC # FLD: 0.05 K/UL — HIGH (ref 0–0)
NRBC BLD AUTO-RTO: 0 /100 WBCS — SIGNIFICANT CHANGE UP (ref 0–0)
NRBC BLD-RTO: 1 /100 WBCS — HIGH (ref 0–0)
PHOSPHATE SERPL-MCNC: 3.7 MG/DL — SIGNIFICANT CHANGE UP (ref 2.5–4.5)
PHOSPHATE SERPL-MCNC: 3.8 MG/DL — SIGNIFICANT CHANGE UP (ref 2.5–4.5)
PHOSPHATE SERPL-MCNC: 4.1 MG/DL — SIGNIFICANT CHANGE UP (ref 2.5–4.5)
PHOSPHATE SERPL-MCNC: 4.5 MG/DL — SIGNIFICANT CHANGE UP (ref 2.5–4.5)
PLAT MORPH BLD: NORMAL — SIGNIFICANT CHANGE UP
PLATELET # BLD AUTO: 114 K/UL — LOW (ref 150–400)
PLATELET # BLD AUTO: 126 K/UL — LOW (ref 150–400)
PLATELET # BLD AUTO: 127 K/UL — LOW (ref 150–400)
PLATELET # BLD AUTO: 128 K/UL — LOW (ref 150–400)
PLATELET COUNT - ESTIMATE: ABNORMAL
POLYCHROMASIA BLD QL SMEAR: SLIGHT — SIGNIFICANT CHANGE UP
POTASSIUM SERPL-MCNC: 4.1 MMOL/L — SIGNIFICANT CHANGE UP (ref 3.5–5.3)
POTASSIUM SERPL-MCNC: 4.2 MMOL/L — SIGNIFICANT CHANGE UP (ref 3.5–5.3)
POTASSIUM SERPL-MCNC: 4.2 MMOL/L — SIGNIFICANT CHANGE UP (ref 3.5–5.3)
POTASSIUM SERPL-MCNC: 4.5 MMOL/L — SIGNIFICANT CHANGE UP (ref 3.5–5.3)
POTASSIUM SERPL-SCNC: 4.1 MMOL/L — SIGNIFICANT CHANGE UP (ref 3.5–5.3)
POTASSIUM SERPL-SCNC: 4.2 MMOL/L — SIGNIFICANT CHANGE UP (ref 3.5–5.3)
POTASSIUM SERPL-SCNC: 4.2 MMOL/L — SIGNIFICANT CHANGE UP (ref 3.5–5.3)
POTASSIUM SERPL-SCNC: 4.5 MMOL/L — SIGNIFICANT CHANGE UP (ref 3.5–5.3)
PROT SERPL-MCNC: 6.4 G/DL — SIGNIFICANT CHANGE UP (ref 6–8.3)
PROT SERPL-MCNC: 6.7 G/DL — SIGNIFICANT CHANGE UP (ref 6–8.3)
PROT SERPL-MCNC: 6.7 G/DL — SIGNIFICANT CHANGE UP (ref 6–8.3)
PROT SERPL-MCNC: 6.8 G/DL — SIGNIFICANT CHANGE UP (ref 6–8.3)
PROTHROM AB SERPL-ACNC: 27.9 SEC — HIGH (ref 9.9–13.4)
PROTHROM AB SERPL-ACNC: 28.2 SEC — HIGH (ref 9.9–13.4)
PROTHROM AB SERPL-ACNC: 28.5 SEC — HIGH (ref 9.9–13.4)
PROTHROM AB SERPL-ACNC: 28.7 SEC — HIGH (ref 9.9–13.4)
RBC # BLD: 5.38 M/UL — SIGNIFICANT CHANGE UP (ref 4.2–5.8)
RBC # BLD: 5.5 M/UL — SIGNIFICANT CHANGE UP (ref 4.2–5.8)
RBC # BLD: 5.54 M/UL — SIGNIFICANT CHANGE UP (ref 4.2–5.8)
RBC # BLD: 5.55 M/UL — SIGNIFICANT CHANGE UP (ref 4.2–5.8)
RBC # FLD: 19.6 % — HIGH (ref 10.3–14.5)
RBC # FLD: 19.7 % — HIGH (ref 10.3–14.5)
RBC # FLD: 19.9 % — HIGH (ref 10.3–14.5)
RBC # FLD: 19.9 % — HIGH (ref 10.3–14.5)
RBC BLD AUTO: ABNORMAL
SMUDGE CELLS # BLD: PRESENT — SIGNIFICANT CHANGE UP
SODIUM SERPL-SCNC: 141 MMOL/L — SIGNIFICANT CHANGE UP (ref 135–145)
SODIUM SERPL-SCNC: 142 MMOL/L — SIGNIFICANT CHANGE UP (ref 135–145)
TRIGL SERPL-MCNC: 186 MG/DL — HIGH
TRIGL SERPL-MCNC: 192 MG/DL — HIGH
VANCOMYCIN TROUGH SERPL-MCNC: 13.6 UG/ML — SIGNIFICANT CHANGE UP (ref 10–20)
WBC # BLD: 18.93 K/UL — HIGH (ref 3.8–10.5)
WBC # BLD: 19.28 K/UL — HIGH (ref 3.8–10.5)
WBC # BLD: 20.05 K/UL — HIGH (ref 3.8–10.5)
WBC # BLD: 20.21 K/UL — HIGH (ref 3.8–10.5)
WBC # FLD AUTO: 18.93 K/UL — HIGH (ref 3.8–10.5)
WBC # FLD AUTO: 19.28 K/UL — HIGH (ref 3.8–10.5)
WBC # FLD AUTO: 20.05 K/UL — HIGH (ref 3.8–10.5)
WBC # FLD AUTO: 20.21 K/UL — HIGH (ref 3.8–10.5)

## 2025-02-28 PROCEDURE — 99418 PROLNG IP/OBS E/M EA 15 MIN: CPT | Mod: GC

## 2025-02-28 PROCEDURE — 99233 SBSQ HOSP IP/OBS HIGH 50: CPT | Mod: GC

## 2025-02-28 PROCEDURE — 33948 ECMO/ECLS DAILY MGMT-VENOUS: CPT

## 2025-02-28 PROCEDURE — 99291 CRITICAL CARE FIRST HOUR: CPT

## 2025-02-28 PROCEDURE — 71045 X-RAY EXAM CHEST 1 VIEW: CPT | Mod: 26

## 2025-02-28 PROCEDURE — 99292 CRITICAL CARE ADDL 30 MIN: CPT

## 2025-02-28 PROCEDURE — 93308 TTE F-UP OR LMTD: CPT | Mod: 26

## 2025-02-28 PROCEDURE — 76604 US EXAM CHEST: CPT | Mod: 26

## 2025-02-28 RX ORDER — FUROSEMIDE 10 MG/ML
40 INJECTION INTRAMUSCULAR; INTRAVENOUS ONCE
Refills: 0 | Status: COMPLETED | OUTPATIENT
Start: 2025-02-28 | End: 2025-02-28

## 2025-02-28 RX ORDER — CYST/ALA/Q10/PHOS.SER/DHA/BROC 100-20-50
15 POWDER (GRAM) ORAL DAILY
Refills: 0 | Status: DISCONTINUED | OUTPATIENT
Start: 2025-02-28 | End: 2025-03-29

## 2025-02-28 RX ORDER — METHYLNALTREXONE BROMIDE 12 MG/.6ML
12 INJECTION, SOLUTION SUBCUTANEOUS ONCE
Refills: 0 | Status: COMPLETED | OUTPATIENT
Start: 2025-02-28 | End: 2025-02-28

## 2025-02-28 RX ORDER — CALCIUM GLUCONATE 20 MG/ML
2 INJECTION, SOLUTION INTRAVENOUS ONCE
Refills: 0 | Status: COMPLETED | OUTPATIENT
Start: 2025-02-28 | End: 2025-02-28

## 2025-02-28 RX ORDER — HYDROMORPHONE/SOD CHLOR,ISO/PF 2 MG/10 ML
1 SYRINGE (ML) INJECTION ONCE
Refills: 0 | Status: DISCONTINUED | OUTPATIENT
Start: 2025-02-28 | End: 2025-02-28

## 2025-02-28 RX ADMIN — Medication 1 APPLICATION(S): at 05:19

## 2025-02-28 RX ADMIN — Medication 15 MILLILITER(S): at 17:14

## 2025-02-28 RX ADMIN — Medication 25 MG/HR: at 07:24

## 2025-02-28 RX ADMIN — Medication 15 MILLILITER(S): at 12:20

## 2025-02-28 RX ADMIN — Medication 1 MG/HR: at 19:01

## 2025-02-28 RX ADMIN — Medication 15 MILLILITER(S): at 05:11

## 2025-02-28 RX ADMIN — DEXMEDETOMIDINE HYDROCHLORIDE IN SODIUM CHLORIDE 30.1 MICROGRAM(S)/KG/HR: 4 INJECTION INTRAVENOUS at 18:59

## 2025-02-28 RX ADMIN — Medication 1 MG/HR: at 07:24

## 2025-02-28 RX ADMIN — FUROSEMIDE 40 MILLIGRAM(S): 10 INJECTION INTRAMUSCULAR; INTRAVENOUS at 17:14

## 2025-02-28 RX ADMIN — IPRATROPIUM BROMIDE AND ALBUTEROL SULFATE 3 MILLILITER(S): .5; 2.5 SOLUTION RESPIRATORY (INHALATION) at 03:11

## 2025-02-28 RX ADMIN — IPRATROPIUM BROMIDE AND ALBUTEROL SULFATE 3 MILLILITER(S): .5; 2.5 SOLUTION RESPIRATORY (INHALATION) at 15:32

## 2025-02-28 RX ADMIN — INSULIN LISPRO 1: 100 INJECTION, SOLUTION INTRAVENOUS; SUBCUTANEOUS at 05:21

## 2025-02-28 RX ADMIN — Medication 40 MILLIGRAM(S): at 11:08

## 2025-02-28 RX ADMIN — ARGATROBAN 15.8 MICROGRAM(S)/KG/MIN: 100 INJECTION, SOLUTION INTRAVENOUS at 07:24

## 2025-02-28 RX ADMIN — Medication 166.67 MILLIGRAM(S): at 22:56

## 2025-02-28 RX ADMIN — PROPOFOL 72.2 MICROGRAM(S)/KG/MIN: 10 INJECTION, EMULSION INTRAVENOUS at 19:00

## 2025-02-28 RX ADMIN — PROPOFOL 72.2 MICROGRAM(S)/KG/MIN: 10 INJECTION, EMULSION INTRAVENOUS at 05:23

## 2025-02-28 RX ADMIN — Medication 4 MILLILITER(S): at 20:29

## 2025-02-28 RX ADMIN — METHYLNALTREXONE BROMIDE 12 MILLIGRAM(S): 12 INJECTION, SOLUTION SUBCUTANEOUS at 11:08

## 2025-02-28 RX ADMIN — MUPIROCIN CALCIUM 1 APPLICATION(S): 20 CREAM TOPICAL at 17:11

## 2025-02-28 RX ADMIN — Medication 1 APPLICATION(S): at 05:13

## 2025-02-28 RX ADMIN — Medication 1 MILLIGRAM(S): at 14:46

## 2025-02-28 RX ADMIN — Medication 1 APPLICATION(S): at 17:11

## 2025-02-28 RX ADMIN — POLYETHYLENE GLYCOL 3350 17 GRAM(S): 17 POWDER, FOR SOLUTION ORAL at 17:14

## 2025-02-28 RX ADMIN — Medication 4 MILLILITER(S): at 15:32

## 2025-02-28 RX ADMIN — CLOTRIMAZOLE 1 APPLICATION(S): 1 CREAM TOPICAL at 05:13

## 2025-02-28 RX ADMIN — INSULIN LISPRO 1: 100 INJECTION, SOLUTION INTRAVENOUS; SUBCUTANEOUS at 11:20

## 2025-02-28 RX ADMIN — CALCIUM GLUCONATE 200 GRAM(S): 20 INJECTION, SOLUTION INTRAVENOUS at 12:51

## 2025-02-28 RX ADMIN — Medication 10 MILLILITER(S): at 05:21

## 2025-02-28 RX ADMIN — Medication 25 GRAM(S): at 22:56

## 2025-02-28 RX ADMIN — PROPOFOL 72.2 MICROGRAM(S)/KG/MIN: 10 INJECTION, EMULSION INTRAVENOUS at 07:24

## 2025-02-28 RX ADMIN — DEXAMETHASONE 110 MILLIGRAM(S): 0.5 TABLET ORAL at 05:12

## 2025-02-28 RX ADMIN — Medication 4 MILLILITER(S): at 03:08

## 2025-02-28 RX ADMIN — Medication 10 MILLILITER(S): at 17:12

## 2025-02-28 RX ADMIN — Medication 166.67 MILLIGRAM(S): at 05:12

## 2025-02-28 RX ADMIN — Medication 166.67 MILLIGRAM(S): at 14:41

## 2025-02-28 RX ADMIN — CLOTRIMAZOLE 1 APPLICATION(S): 1 CREAM TOPICAL at 17:11

## 2025-02-28 RX ADMIN — Medication 25 GRAM(S): at 05:18

## 2025-02-28 RX ADMIN — DEXMEDETOMIDINE HYDROCHLORIDE IN SODIUM CHLORIDE 30.1 MICROGRAM(S)/KG/HR: 4 INJECTION INTRAVENOUS at 07:24

## 2025-02-28 RX ADMIN — ARGATROBAN 15.8 MICROGRAM(S)/KG/MIN: 100 INJECTION, SOLUTION INTRAVENOUS at 19:01

## 2025-02-28 RX ADMIN — IPRATROPIUM BROMIDE AND ALBUTEROL SULFATE 3 MILLILITER(S): .5; 2.5 SOLUTION RESPIRATORY (INHALATION) at 20:31

## 2025-02-28 RX ADMIN — Medication 25 GRAM(S): at 14:30

## 2025-02-28 RX ADMIN — IPRATROPIUM BROMIDE AND ALBUTEROL SULFATE 3 MILLILITER(S): .5; 2.5 SOLUTION RESPIRATORY (INHALATION) at 08:41

## 2025-02-28 RX ADMIN — MUPIROCIN CALCIUM 1 APPLICATION(S): 20 CREAM TOPICAL at 05:13

## 2025-02-28 RX ADMIN — INSULIN LISPRO 2: 100 INJECTION, SOLUTION INTRAVENOUS; SUBCUTANEOUS at 23:50

## 2025-02-28 RX ADMIN — Medication 4 MILLILITER(S): at 08:41

## 2025-02-28 RX ADMIN — INSULIN LISPRO 1: 100 INJECTION, SOLUTION INTRAVENOUS; SUBCUTANEOUS at 17:11

## 2025-02-28 RX ADMIN — POLYETHYLENE GLYCOL 3350 17 GRAM(S): 17 POWDER, FOR SOLUTION ORAL at 05:14

## 2025-02-28 RX ADMIN — Medication 25 MG/HR: at 19:01

## 2025-02-28 RX ADMIN — FUROSEMIDE 40 MILLIGRAM(S): 10 INJECTION INTRAMUSCULAR; INTRAVENOUS at 09:21

## 2025-02-28 NOTE — PROGRESS NOTE ADULT - SUBJECTIVE AND OBJECTIVE BOX
Interval Events:        HPI:  40 yo M w/ class III obesity, pAfib (no AC), HTN, BEA (on CPAP), history of b/l papilledema attributed to pseudotumor cerebri s/p LP in 2024 with very high opening pressure, who is transferred for VV ECMO for ARDS and severe hypoxia. Patient initially presented to [facility] on 2/24 for SOB and b/l LE edema. 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 He is a current every day smoker of Newports and marijuana, but does not drink or do other drugs. Currently lives with his mother. There is a long term partner in his life, but they are not , and they have an on/off relationship currently not very involved with each other as per the patient's mother and aunt.  At [facility] where he was getting an eval last year for shortness of breath, he had a sleep study and was diagnosed with sleep apnea, prescribed a PAP machine, which he has in his room but the mother is not sure how many nights per week he uses it. Recently, the last day or two, his mother and brother have been under the weather with URIs and the patient 2 days ago started to develop a runny nose, ough, some wheezing of the chest, as well as worsening shortness of breath and fevers at home. He called his aunt who told him to go get checked out and then he landed at the [facility] ED. Patient found to be reportedly hypoxemic to 70% on RA with worsening respiratory status/secretions and somnolence in the ED. Patient was intubated with difficulty (7 attempts) due to very large tongue secretions. Despite optimal vent management, patient remained hypoxemic. Unable to prone due to obesity. Patient cannulated for VV ECMO on 2/25 and transferred to [facility] for further management.     [facility] labs notable for MRSA PCR -, Fluvid -, urine legionella -, sputum gram stain  with GPC and GPR    CTA chest on 2/24 negative for pulmonary embolism, notable for patchy bilateral lower lobe opacities, hepatomegaly, mild ascites, edema/induration of the abdominal pannus.    LE duplex on 2/25 negative for DVT    TTE on 2/25 showed LV EF 61%, enlarged RV with TAPSE 2.1cm, ePASP at least 49 (Patient had 10/2024 TTE with normal LV and RV with ePASP 14).    Only home med is Lasix. Not on acetazolamide for pseudotumor or on Wegovy (was pending auth) (26 Feb 2025 01:48)      REVIEW OF SYSTEMS:  [x ] Unable to assess ROS because patient is sedated/intubated             Vital Signs Last 24 Hrs  T(C): 36.3 (28 Feb 2025 04:00), Max: 37.6 (27 Feb 2025 12:00)  T(F): 97.3 (28 Feb 2025 04:00), Max: 99.7 (27 Feb 2025 12:00)  HR: 67 (28 Feb 2025 06:00) (66 - 886)  BP: --  BP(mean): --  RR: 14 (28 Feb 2025 06:00) (14 - 15)  SpO2: 91% (28 Feb 2025 06:00) (83% - 95%)          I&O's Summary    26 Feb 2025 07:01  -  27 Feb 2025 07:00  --------------------------------------------------------  IN: 6838.7 mL / OUT: 6255 mL / NET: 583.7 mL    27 Feb 2025 07:01  -  28 Feb 2025 06:16  --------------------------------------------------------  IN: 4552.9 mL / OUT: 7505 mL / NET: -2952.1 mL          ECMO SETTINGS:  Type:		[ ] Venovenous		[ ] Venoarterial  Cannulation Site(s):     Flow:  	          P1:                  Delta P:  RPM: 		  P2:                  SVO2:    Oxygenator:	Sweep:     L/min	FiO2:     ABG - ( 28 Feb 2025 04:28 )  pH: 7.38  /  pCO2: 50    /  pO2: 94    / HCO3: 30    / Base Excess: 3.5   /  SaO2: 97.6                  VENTILATOR SETTINGS:     Mode: AC/ CMV (Assist Control/ Continuous Mandatory Ventilation)  RR (machine): 14  FiO2: 50  PEEP: 18  ITime: 1  MAP: 22  PC: 12  PIP: 30      Physical Exam:   Constitutional: ill appearing, no acute distress   HEENT: + PERRLA, EOMI, no drainage or redness  Neck: supple,  No JVD, Right IJ ecmo cannula in place   Respiratory: Ventilator assisted breath Sounds diminished bilaterally to auscultation, no accessory muscle use noted  Cardiovascular: Regular rate, regular rhythm, normal S1, S2; no murmurs or rub  Gastrointestinal: Obese, soft, non distended, no hepatosplenomegaly, normal bowel sounds  Extremities: + 2 peripheral edema, no cyanosis, no clubbing   Vascular: Equal and normal pulses: 2+ peripheral pulses throughout  Neurological: sedated/intubated  Musculoskeletal: No joint swelling or deformity; no limitation of movement  Skin: warm, dry, well perfused, cellulitis to pannus area, DTI to right buttocks       LABS:                          12.4   20.21 )-----------( 128      ( 28 Feb 2025 04:28 )             42.4                          02-28    141  |  103  |  25[H]  ----------------------------<  170[H]  4.5   |  25  |  0.97    Ca    8.0[L]      28 Feb 2025 04:28  Phos  4.5     02-28  Mg     2.10     02-28    TPro  6.4  /  Alb  2.6[L]  /  TBili  1.4[H]  /  DBili  x   /  AST  116[H]  /  ALT  65[H]  /  AlkPhos  55  02-28      LIVER FUNCTIONS - ( 28 Feb 2025 04:28 )  Alb: 2.6 g/dL / Pro: 6.4 g/dL / ALK PHOS: 55 U/L / ALT: 65 U/L / AST: 116 U/L / GGT: x             PT/INR - ( 28 Feb 2025 04:28 )   PT: 28.5 sec;   INR: 2.42 ratio         PTT - ( 28 Feb 2025 04:28 )  PTT:59.5 sec      Culture - Fungal, Bronchial (collected 26 Feb 2025 08:59)  Source: Bronchial Bronchial Lavage  Preliminary Report (27 Feb 2025 11:37):    Testing in progress    Culture - Acid Fast - Bronchial w/Smear (collected 26 Feb 2025 08:59)  Source: Bronchial Bronchial Lavage    Culture - Bronchial (collected 26 Feb 2025 08:59)  Source: Bronchial Bronchial Lavage  Gram Stain (26 Feb 2025 14:11):    Moderate polymorphonuclear leukocytes seen per low power field    No squamous epithelial cells seen per low power field    No organisms seen per oil power field  Final Report (27 Feb 2025 18:30):    No growth    Culture - Blood (collected 26 Feb 2025 05:30)  Source: .Blood Blood  Preliminary Report (27 Feb 2025 09:02):    No growth at 24 hours    Culture - Blood (collected 26 Feb 2025 05:30)  Source: .Blood Blood  Preliminary Report (27 Feb 2025 09:02):    No growth at 24 hours    Culture - Urine (collected 26 Feb 2025 02:20)  Source: Catheterized Catheterized  Final Report (27 Feb 2025 06:20):    No growth    Culture - Legionella (collected 25 Feb 2025 12:44)  Source: Legionella SPUTUM  Preliminary Report (27 Feb 2025 14:37):    No Legionella species isolated    Culture - Sputum (collected 25 Feb 2025 12:20)  Source: .Sputum Sputum  Gram Stain (25 Feb 2025 20:54):    Moderate polymorphonuclear leukocytes per low power field    No Squamous epithelial cells per low power field    Rare Gram Positive Cocci in Pairs and Chains per oil power field    Rare Gram Positive Rods per oil power field  Final Report (26 Feb 2025 22:39):    Commensal kyle consistent with body site    Culture - Urine (collected 25 Feb 2025 06:30)  Source: Catheterized Catheterized  Final Report (26 Feb 2025 08:38):    <10,000 CFU/mL Normal Urogenital Kyle          ACTIVE MEDS:    MEDICATIONS  (STANDING):  albuterol/ipratropium for Nebulization 3 milliLiter(s) Nebulizer every 6 hours  argatroban Infusion 1.1 MICROgram(s)/kG/Min (15.8 mL/Hr) IV Continuous <Continuous>  chlorhexidine 0.12% Liquid 15 milliLiter(s) Oral Mucosa every 12 hours  chlorhexidine 2% Cloths 1 Application(s) Topical <User Schedule>  clotrimazole 1% Cream 1 Application(s) Topical two times a day  dexAMETHasone  IVPB 20 milliGRAM(s) IV Intermittent daily  dexMEDEtomidine Infusion 0.5 MICROgram(s)/kG/Hr (30.1 mL/Hr) IV Continuous <Continuous>  dextrose 50% Injectable 25 Gram(s) IV Push once  dextrose 50% Injectable 12.5 Gram(s) IV Push once  dextrose 50% Injectable 25 Gram(s) IV Push once  glucagon  Injectable 1 milliGRAM(s) IntraMuscular once  HYDROmorphone Infusion. 1 mG/Hr (1 mL/Hr) IV Continuous <Continuous>  insulin lispro (ADMELOG) corrective regimen sliding scale   SubCutaneous every 6 hours  mupirocin 2% Ointment 1 Application(s) Topical two times a day  naloxegol 12.5 milliGRAM(s) Oral daily  niCARdipine Infusion 5 mG/Hr (25 mL/Hr) IV Continuous <Continuous>  pantoprazole  Injectable 40 milliGRAM(s) IV Push daily  petrolatum Ophthalmic Ointment 1 Application(s) Both EYES every 12 hours  piperacillin/tazobactam IVPB.. 4.5 Gram(s) IV Intermittent every 8 hours  polyethylene glycol 3350 17 Gram(s) Oral two times a day  propofol Infusion 50 MICROgram(s)/kG/Min (72.2 mL/Hr) IV Continuous <Continuous>  senna Syrup 10 milliLiter(s) Oral two times a day  sodium chloride 3%  Inhalation 4 milliLiter(s) Inhalation every 6 hours  vancomycin  IVPB      vancomycin  IVPB 1250 milliGRAM(s) IV Intermittent every 8 hours         Interval Events: Mucous plugged requiring aggressive suctioning and lavage. Sweep lowered to 3.5 and Fd02 remained  at 100%        HPI:  38 yo M w/ class III obesity, pAfib (no AC), HTN, BEA (on CPAP), history of b/l papilledema attributed to pseudotumor cerebri s/p LP in 2024 with very high opening pressure, who is transferred for VV ECMO for ARDS and severe hypoxia. Patient initially presented to [facility] on 2/24 for SOB and b/l LE edema. 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 He is a current every day smoker of Newports and marijuana, but does not drink or do other drugs. Currently lives with his mother. There is a long term partner in his life, but they are not , and they have an on/off relationship currently not very involved with each other as per the patient's mother and aunt.  At [facility] [place] where he was getting an eval last year for shortness of breath, he had a sleep study and was diagnosed with sleep apnea, prescribed a PAP machine, which he has in his room but the mother is not sure how many nights per week he uses it. Recently, the last day or two, his mother and brother have been under the weather with URIs and the patient 2 days ago started to develop a runny nose, ough, some wheezing of the chest, as well as worsening shortness of breath and fevers at home. He called his aunt who told him to go get checked out and then he landed at the [facility] ED. Patient found to be reportedly hypoxemic to 70% on RA with worsening respiratory status/secretions and somnolence in the ED. Patient was intubated with difficulty (7 attempts) due to very large tongue secretions. Despite optimal vent management, patient remained hypoxemic. Unable to prone due to obesity. Patient cannulated for VV ECMO on 2/25 and transferred to [facility] for further management.     [facility] labs notable for MRSA PCR -, Fluvid -, urine legionella -, sputum gram stain  with GPC and GPR    CTA chest on 2/24 negative for pulmonary embolism, notable for patchy bilateral lower lobe opacities, hepatomegaly, mild ascites, edema/induration of the abdominal pannus.    LE duplex on 2/25 negative for DVT    TTE on 2/25 showed LV EF 61%, enlarged RV with TAPSE 2.1cm, ePASP at least 49 (Patient had 10/2024 TTE with normal LV and RV with ePASP 14).    Only home med is Lasix. Not on acetazolamide for pseudotumor or on Wegovy (was pending auth) (26 Feb 2025 01:48)      REVIEW OF SYSTEMS:  [x ] Unable to assess ROS because patient is sedated/intubated             Vital Signs Last 24 Hrs  T(C): 36.3 (28 Feb 2025 04:00), Max: 37.6 (27 Feb 2025 12:00)  T(F): 97.3 (28 Feb 2025 04:00), Max: 99.7 (27 Feb 2025 12:00)  HR: 67 (28 Feb 2025 06:00) (66 - 886)  BP: --  BP(mean): --  RR: 14 (28 Feb 2025 06:00) (14 - 15)  SpO2: 91% (28 Feb 2025 06:00) (83% - 95%)          I&O's Summary    26 Feb 2025 07:01  -  27 Feb 2025 07:00  --------------------------------------------------------  IN: 6838.7 mL / OUT: 6255 mL / NET: 583.7 mL    27 Feb 2025 07:01  -  28 Feb 2025 06:16  --------------------------------------------------------  IN: 4552.9 mL / OUT: 7505 mL / NET: -2952.1 mL          ECMO SETTINGS:  Type:		[x] Venovenous		[ ] Venoarterial  Cannulation Site(s): RIJ/R.Fem    Flow: 5.1         P1:  219                Delta P: 43  RPM: 3700      P2:  176                SVO2: 77    Oxygenator: 	Sweep: 3.5   1 L/min	FiO2: 1    ABG - ( 28 Feb 2025 04:28 )  pH: 7.38  /  pCO2: 50    /  pO2: 94    / HCO3: 30    / Base Excess: 3.5   /  SaO2: 97.6            VENTILATOR SETTINGS:     Mode: AC/ CMV (Assist Control/ Continuous Mandatory Ventilation)  RR (machine): 14  FiO2: 50  PEEP: 18  ITime: 1  MAP: 22  PC: 12  PIP: 30      Physical Exam:   Constitutional: ill appearing, no acute distress   HEENT: + PERRLA, EOMI, no drainage or redness  Neck: supple,  No JVD, Right IJ ecmo cannula in place   Respiratory: Ventilator assisted breath Sounds diminished bilaterally to auscultation, no accessory muscle use noted  Cardiovascular: Regular rate, regular rhythm, normal S1, S2; no murmurs or rub  Gastrointestinal: Obese, soft, non distended, no hepatosplenomegaly, normal bowel sounds  Extremities: + 2 peripheral edema, no cyanosis, no clubbing   Vascular: Equal and normal pulses: 2+ peripheral pulses throughout  Neurological: sedated/intubated  Musculoskeletal: No joint swelling or deformity; no limitation of movement  Skin: warm, dry, well perfused, cellulitis to pannus area, DTI to right buttocks       LABS:                          12.4   20.21 )-----------( 128      ( 28 Feb 2025 04:28 )             42.4                          02-28    141  |  103  |  25[H]  ----------------------------<  170[H]  4.5   |  25  |  0.97    Ca    8.0[L]      28 Feb 2025 04:28  Phos  4.5     02-28  Mg     2.10     02-28    TPro  6.4  /  Alb  2.6[L]  /  TBili  1.4[H]  /  DBili  x   /  AST  116[H]  /  ALT  65[H]  /  AlkPhos  55  02-28      LIVER FUNCTIONS - ( 28 Feb 2025 04:28 )  Alb: 2.6 g/dL / Pro: 6.4 g/dL / ALK PHOS: 55 U/L / ALT: 65 U/L / AST: 116 U/L / GGT: x             PT/INR - ( 28 Feb 2025 04:28 )   PT: 28.5 sec;   INR: 2.42 ratio         PTT - ( 28 Feb 2025 04:28 )  PTT:59.5 sec      Culture - Fungal, Bronchial (collected 26 Feb 2025 08:59)  Source: Bronchial Bronchial Lavage  Preliminary Report (27 Feb 2025 11:37):    Testing in progress    Culture - Acid Fast - Bronchial w/Smear (collected 26 Feb 2025 08:59)  Source: Bronchial Bronchial Lavage    Culture - Bronchial (collected 26 Feb 2025 08:59)  Source: Bronchial Bronchial Lavage  Gram Stain (26 Feb 2025 14:11):    Moderate polymorphonuclear leukocytes seen per low power field    No squamous epithelial cells seen per low power field    No organisms seen per oil power field  Final Report (27 Feb 2025 18:30):    No growth    Culture - Blood (collected 26 Feb 2025 05:30)  Source: .Blood Blood  Preliminary Report (27 Feb 2025 09:02):    No growth at 24 hours    Culture - Blood (collected 26 Feb 2025 05:30)  Source: .Blood Blood  Preliminary Report (27 Feb 2025 09:02):    No growth at 24 hours    Culture - Urine (collected 26 Feb 2025 02:20)  Source: Catheterized Catheterized  Final Report (27 Feb 2025 06:20):    No growth    Culture - Legionella (collected 25 Feb 2025 12:44)  Source: Legionella SPUTUM  Preliminary Report (27 Feb 2025 14:37):    No Legionella species isolated    Culture - Sputum (collected 25 Feb 2025 12:20)  Source: .Sputum Sputum  Gram Stain (25 Feb 2025 20:54):    Moderate polymorphonuclear leukocytes per low power field    No Squamous epithelial cells per low power field    Rare Gram Positive Cocci in Pairs and Chains per oil power field    Rare Gram Positive Rods per oil power field  Final Report (26 Feb 2025 22:39):    Commensal kyle consistent with body site    Culture - Urine (collected 25 Feb 2025 06:30)  Source: Catheterized Catheterized  Final Report (26 Feb 2025 08:38):    <10,000 CFU/mL Normal Urogenital Kyle          ACTIVE MEDS:    MEDICATIONS  (STANDING):  albuterol/ipratropium for Nebulization 3 milliLiter(s) Nebulizer every 6 hours  argatroban Infusion 1.1 MICROgram(s)/kG/Min (15.8 mL/Hr) IV Continuous <Continuous>  chlorhexidine 0.12% Liquid 15 milliLiter(s) Oral Mucosa every 12 hours  chlorhexidine 2% Cloths 1 Application(s) Topical <User Schedule>  clotrimazole 1% Cream 1 Application(s) Topical two times a day  dexAMETHasone  IVPB 20 milliGRAM(s) IV Intermittent daily  dexMEDEtomidine Infusion 0.5 MICROgram(s)/kG/Hr (30.1 mL/Hr) IV Continuous <Continuous>  dextrose 50% Injectable 25 Gram(s) IV Push once  dextrose 50% Injectable 12.5 Gram(s) IV Push once  dextrose 50% Injectable 25 Gram(s) IV Push once  glucagon  Injectable 1 milliGRAM(s) IntraMuscular once  HYDROmorphone Infusion. 1 mG/Hr (1 mL/Hr) IV Continuous <Continuous>  insulin lispro (ADMELOG) corrective regimen sliding scale   SubCutaneous every 6 hours  mupirocin 2% Ointment 1 Application(s) Topical two times a day  naloxegol 12.5 milliGRAM(s) Oral daily  niCARdipine Infusion 5 mG/Hr (25 mL/Hr) IV Continuous <Continuous>  pantoprazole  Injectable 40 milliGRAM(s) IV Push daily  petrolatum Ophthalmic Ointment 1 Application(s) Both EYES every 12 hours  piperacillin/tazobactam IVPB.. 4.5 Gram(s) IV Intermittent every 8 hours  polyethylene glycol 3350 17 Gram(s) Oral two times a day  propofol Infusion 50 MICROgram(s)/kG/Min (72.2 mL/Hr) IV Continuous <Continuous>  senna Syrup 10 milliLiter(s) Oral two times a day  sodium chloride 3%  Inhalation 4 milliLiter(s) Inhalation every 6 hours  vancomycin  IVPB      vancomycin  IVPB 1250 milliGRAM(s) IV Intermittent every 8 hours

## 2025-02-28 NOTE — CHART NOTE - NSCHARTNOTEFT_GEN_A_CORE
______________ CRITICAL CARE NUTRITION FOLLOW-UP ________________        --Medical Course--  39y  Male with Hx of class III , HTN, pseudotumor cerebri.  Pt. presented with hypoxia, intubated.    Cannulated for VV-ECMO & transferred from [facility] (2/25) to [facility] MICU for further management.    Pt. also with DM, and findings of pulmonary HTN in setting of possible BEA/OHS, c/b PNA, ARDS and pulmonary edema.   CT abd/pelvis shoes hepatomegaly, mild ascites and edema.  Receiving intermittent diuresis.      Pt. currently remains intubated/sedated on VV-ECMO circuit.  Propofol rate decreased by half with plan, per NP, to wean off Propofol.  Has received ~1167KCals from Propofol in past 24hrs, although current rate, if maintained over 24hrs, will give ~953 KCals in addition to tube feeding calories.       Spoke with NP and pulmonary fellow.      --Nutrition Course--  Weight decrease likely related to diuresis.  However Pt. is with 4+ edema.    Has reached previously recommended enteral feeding goal of 35mL/hr + Liquid Protein Supplement (LPS) 3x daily.    +Constipation without recent bowel movement (x at least 2d).  On aggressive bowel regimen.      Considering Propofol being weaned, would modify enteral regimen to more appropriately meet estimated caloric and [increased] protein requirements.  Suggest increasing goal rate of Peptamen VHP to 40mL/hr x 24hrs with 4 packets of Liquid Protein Supplement (LPS) per day to provide:   960mL total volume   960 KCals (+ 400 KCals from LPS) = 1360 KCals   88g protein (+ g additional protein via LPS)= 148g protein   806mL free water     *TF + LPS will give 20 KCals/kg Ideal Body Weight & ~2.2g protein/kg Ideal Body Weight.      Liquid multivitamin advised for micronutrient support.      Providers informed of nutrition suggestions.        __________Diet Order_________  Diet, NPO with Tube Feed:   Tube Feeding Modality: Orogastric  Peptamen Intense VHP  Total Volume for 24 Hours (mL): 840  Continuous  Starting Tube Feed Rate {mL per Hour}: 10  Increase Tube Feed Rate by (mL): 5     Every 4 hours  Until Goal Tube Feed Rate (mL per Hour): 35  Tube Feed Duration (in Hours): 24  Tube Feed Start Time: 00:00  Liquid Protein Supplement     Qty per Day:  3 (02-26-25 @ 13:44) [Active]         Provides:   840 KCals (+ 300 KCals from Propofol) =1140 KCals    77g protein (+ 45g additional protein via LPS)= 122g protein               Weight:      Height: 67" / 170.2cm        Ideal Body Weight: 148lbs / 67.3kg  233.9kg (2/28)  239.2kg (2/27)  240.8kg (2/26)          _____Estimated Energy Needs (based on IBW)_____  22-25 KCals/kg = 7577-5078 KCals/d  2.0-2.5g protein/kg = 134-168g protein/d        Edema: 4+ generalized    Last bowel movement: None recent.    Skin: Right buttock suspected deep tissue injury     ________________________Pertinent Medications____________   MEDICATIONS  (STANDING):  albuterol/ipratropium for Nebulization 3 milliLiter(s) Nebulizer every 6 hours  argatroban Infusion 1.1 MICROgram(s)/kG/Min (15.8 mL/Hr) IV Continuous <Continuous>  chlorhexidine 0.12% Liquid 15 milliLiter(s) Oral Mucosa every 12 hours  chlorhexidine 2% Cloths 1 Application(s) Topical <User Schedule>  clotrimazole 1% Cream 1 Application(s) Topical two times a day  dexAMETHasone  IVPB 20 milliGRAM(s) IV Intermittent daily  dexMEDEtomidine Infusion 0.5 MICROgram(s)/kG/Hr (30.1 mL/Hr) IV Continuous <Continuous>  dextrose 50% Injectable 25 Gram(s) IV Push once  dextrose 50% Injectable 12.5 Gram(s) IV Push once  dextrose 50% Injectable 25 Gram(s) IV Push once  glucagon  Injectable 1 milliGRAM(s) IntraMuscular once  HYDROmorphone Infusion. 1 mG/Hr (1 mL/Hr) IV Continuous <Continuous>  insulin lispro (ADMELOG) corrective regimen sliding scale   SubCutaneous every 6 hours  methylnaltrexone Injectable 12 milliGRAM(s) SubCutaneous once  mupirocin 2% Ointment 1 Application(s) Topical two times a day  niCARdipine Infusion 5 mG/Hr (25 mL/Hr) IV Continuous <Continuous>  pantoprazole  Injectable 40 milliGRAM(s) IV Push daily  petrolatum Ophthalmic Ointment 1 Application(s) Both EYES every 12 hours  piperacillin/tazobactam IVPB.. 4.5 Gram(s) IV Intermittent every 8 hours  polyethylene glycol 3350 17 Gram(s) Oral two times a day  propofol Infusion 50 MICROgram(s)/kG/Min (72.2 mL/Hr) IV Continuous <Continuous>  senna Syrup 10 milliLiter(s) Oral two times a day  sodium chloride 3%  Inhalation 4 milliLiter(s) Inhalation every 6 hours  vancomycin  IVPB      vancomycin  IVPB 1250 milliGRAM(s) IV Intermittent every 8 hours    MEDICATIONS  (PRN):          _________________________Pertinent Labs____________________     02-28    141  |  103  |  25[H]  ----------------------------<  170[H]  4.5   |  25  |  0.97    Ca    8.0[L]      28 Feb 2025 04:28  Phos  4.5     02-28  Mg     2.10     02-28        CAPILLARY BLOOD GLUCOSE    POCT Blood Glucose.: 162 mg/dL (02-28-25 @ 05:07)  POCT Blood Glucose.: 168 mg/dL (02-27-25 @ 23:11)  POCT Blood Glucose.: 125 mg/dL (02-27-25 @ 18:04)      Triglycerides, Serum: 192 mg/dL (02-28-25 @ 04:28)  Triglycerides, Serum: 186 mg/dL (02-27-25 @ 23:05)  Triglycerides, Serum: 223 mg/dL (02-27-25 @ 04:50)  Triglycerides, Serum: 217 mg/dL (02-27-25 @ 00:10)  Triglycerides, Serum: 207 mg/dL (02-26-25 @ 11:15)          PLAN/RECOMMENDATIONS:    1) Increase Peptamen VHP to goal of 40mL/hr x 24s + 4 packets Liquid Protein Supplement (LPS) daily  2) Obtain daily weights  3) Monitor tolerance to tube feeding, GI status, electrolytes, glucose  4) Order liquid multivitamin     RDN remains available and will f/u PRN.          Linette Escobar RDN, CDN       pager [phone] or MS Teams

## 2025-02-28 NOTE — PROGRESS NOTE ADULT - ASSESSMENT
40 yo M w/ class III obesity, pAfib (no AC), HTN, BEA (on CPAP), history of b/l papilledema attributed to pseudotumor cerebri, who is transferred from [place] on 2/26 for VV ECMO 2/2 ARDS. Patient initially presented to [place] on 2/24 for SOB and b/l LE edema. As per chart review, patient endorses some degree of SOB and b/l LE edema x 3 months with significant weight gain. As per ICU team, patient had positive sick contacts with viral URI 1 to 2 weeks PTA and since then has had worsening SOB. Patient found to be reportedly hypoxemic to 70% on RA with worsening respiratory status/secretions and somnolence in the ED. Patient was a difficult intubation (7 attempts). Despite optimal vent management, patient remained hypoxemic. Unable to prone due to obesity. Patient cannulated for VV ECMO on 2/25 and transferred to [facility] for further management.     Neuro  #Mental status   #pseudotumor cerebri  - history of pseudotumor cerebri s/p LP under fluoro in 2020 with high opening pressure with MRI 2020 also showed possible pituitary mass but unclear because of pressure effect from pseudotumor cerebri causing partially empty sella. Considering to start acetazolamide but never started  - prolactin high (30), defer checking cortisol axis given already received steroids but should be investigated, ACTH (<1.5) low but should be suppressed iso steroids  - TSH normal but will check fT4 and T3 given c/f pituitary adenoma  -sedated with Dilaudid and propofol, will start precedex to assist with weaning Precedex   -will stop paralytics today   -PT/OT following     Cardiovascular  #HTN   #Atrial fibrillation   -Hx of a. fib not on AC   - echo from 2024 did not demonstrate elevation of pulmonary pressures, but now with severe PH suggesting chronicity on TTE at    - [facility] 2/26 showing VTI 17  - TTE on 2/25 showed LV EF 61%, enlarged RV with TAPSE 2.1cm, ePASP at least 49 (Patient had 10/2024 TTE with normal LV and RV with ePASP 14).  - troponins initially elevated, peaked at 162 however downtrended   - s/p esmolol drip for high flows now D/C'd may need to consider again if need for higher flows  -hypertensive [id number]'s will start cardene gtt for now     Pulmonary  #Pulmonary HTN  #ARDS  #Multifocal Pneumonia   #BEA   - history of diagnosed BEA/?OHS intermittent compliance to home PAP  - c/f acute on suspected chronic pulmonary hypertension in setting of possible BEA/OHS c/b pneumonia, ARDS, and pulmonary edema   - CTA chest on 2/24 negative for pulmonary embolism, notable for patchy bilateral lower lobe opacities, hepatomegaly, mild ascites, edema/induration of the abdominal pannus.  - Full RVP and BAL cultures negative    - bronchoscopy demonstrated copious purulent secretions 2/26 and 2/27   - 20 mg dexamethasone x 5 days, then 10 mg and taper as per clinical status pneumonia/ARDS  - DuoNeb q6 hours + 3% saline will start IPV   -c/w PC/AC RR 14 PC: 14 PEEP:18 Fio2 50% ~250-300  - difficult glottic visualization due to large tongue upon intubation at [facility] 2/25    #ECMO  VV ECMO		  Cannulation sites/dates:  Flow: 5.5		P1: 217		Delta P: 40  RPM: 4065		P2: 178		SVO2: 76  Sweep: 5L/min:		FIO2: 1      ECMO Calculated CO: 7.28  DO2/VO2: 5.41  VO2/DO2: 0.18    -requiring high flows for oxygenation causing high delta P but D/V adequate, no lactate and SVO2 WNL but will watch closely   -Clinically perfusing. Lactate: 1.8  -ECMO sweep sighing q1hr   - Adjust circuit flow as tolerated with DO2:VO2 goal >2  -Monitor for hemolysis, chatter, evidence of recirculation, mixing      Gastrointestinal  #Diet   -tolerating tube feeds  - c/w bowel regimen   - hepatomegaly and mild ascites noted on CT A/P - no pocket to tap  -RUQ ultrasound with steatosis    - c/w bowel regimen with senna, Miralax and naloxegol, may need relistor if no BM    -triglycerides 223   -nutrition following       Gu/Renal  #ELLA  - ELLA - improving, likely ATN/vascular congestion  - good UO   -c/w intermittent diuresis with lasix may consider dose of metolazone today with next lasix     Infectious disease  #leukocytosis  #Pneumonia   #cellulitis   -s/p cefepime, linezolid and aztihro 2/25-2/26  -c/w zosyn and vanco (2/26-   ) trough due 2/27 @1400  - full RVP negative  - BCx x 2 2/24 currently NGTD, UCx 2/25 NGTD, tracheal aspirate Cx from 2/25 with normal commensal kyle   - strep pneumo Ag and urine legionella negative  - BAL cultures 2/26 currently NGTD   -? penile meatus yellow discharge pending results of GC/chamydia, HIV negative   - candidal intertrigo +/- cellulitis of the pannus - clotrimazole cream 1% BID 2/26 -+ abx as per above    Hematology/DVT ppx  #Anticoagulation   #Anemia   -c/w argatroban with goal 50-90  - mild anemia Hb 11s on admission down from 13 - check iron studies, retic, LDH, hapto  -s/p 1 u PRBC 2/26 for O2 delivery  - LE duplex 2/25 neg    Endocrine  #DM  -A1c 6.7   - obesity with metabolic syndrome  - diabetes with steroid induced hypoglycemia  - pituitary workup, steroids as above given recent weight gain but likely volume though  - c/w ISS and FS Q6h and adjust as needed     SKin/Lines  #Access  #Cellulitis   #DTI  - L subclavian TLC inserted 2/25   - L radial artery line 2/25  - RIJ ECMO cannula 2/26 at 20 cm  - R fem ECMO cannula 2/26 at 25 cm  - candidal intertrigo +/- cellulitis of the pannus - clotrimazole cream 1% BID 2/26 -+ abx as per above  -as per nursing staff, patient with ? DTI to right buttocks   -wound consult placed     GOC   -full code   -palliative involved  and following while on ECMO   -Mother involved in care and life partner      40 yo M w/ class III obesity, pAfib (no AC), HTN, BEA (on CPAP), history of b/l papilledema attributed to pseudotumor cerebri, who is transferred from [place] on 2/26 for VV ECMO 2/2 ARDS. Patient initially presented to [place] on 2/24 for SOB and b/l LE edema. As per chart review, patient endorses some degree of SOB and b/l LE edema x 3 months with significant weight gain. As per ICU team, patient had positive sick contacts with viral URI 1 to 2 weeks PTA and since then has had worsening SOB. Patient found to be reportedly hypoxemic to 70% on RA with worsening respiratory status/secretions and somnolence in the ED. Patient was a difficult intubation (7 attempts). Despite optimal vent management, patient remained hypoxemic. Unable to prone due to obesity. Patient cannulated for VV ECMO on 2/25 and transferred to [facility] for further management.     Neuro  #Mental status   #pseudotumor cerebri  - history of pseudotumor cerebri s/p LP under fluoro in 2020 with high opening pressure with MRI 2020 also showed possible pituitary mass but unclear because of pressure effect from pseudotumor cerebri causing partially empty sella. Considering to start acetazolamide but never started  - prolactin high (30), defer checking cortisol axis given already received steroids but should be investigated, ACTH (<1.5) low but should be suppressed iso steroids  - concern for pituitary adenoma, however TSH .79 normal,  fT4 0.9 normal,   and low T3 67  - sedated with Dilaudid,  propofol, and precedex gtt. Goal to wean off propofol today  - paralytics d/c'd 2/27   - PT/OT following     Cardiovascular  #HTN   #Atrial fibrillation   - Hx of a. fib not on AC   - echo from 2024 did not demonstrate elevation of pulmonary pressures, but now with severe PH suggesting chronicity on TTE at    - [facility] 2/26 showing VTI 17  - TTE on 2/25 showed LV EF 61%, enlarged RV with TAPSE 2.1cm, ePASP at least 49 (Patient had 10/2024 TTE with normal LV and RV with ePASP 14).  - troponins initially elevated, peaked at 162 however downtrended   - s/p esmolol drip for high flows now D/C'd may need to consider again if need for higher flows  - cardene gtt started for hypertension [id number], will continue for now     Pulmonary  #Pulmonary HTN  #ARDS  #Multifocal Pneumonia   #BEA   - history of diagnosed BEA/?OHS intermittent compliance to home PAP  - c/f acute on suspected chronic pulmonary hypertension in setting of possible BEA/OHS c/b pneumonia, ARDS, and pulmonary edema   - CTA chest on 2/24 negative for pulmonary embolism, notable for patchy bilateral lower lobe opacities, hepatomegaly, mild ascites, edema/induration of the abdominal pannus.  - Full RVP and BAL cultures negative    - Bronchoscopy demonstrated copious purulent secretions 2/26 and 2/27   - Dexamethasone 20mg x 5 days, then 10 mg and taper as per clinical status pneumonia/ARDS  - DuoNeb q6 hours + 3% saline will start IPV   - c/w PC/AC PIP:30  RR 14 PC: 12 PEEP:18 Fio2 50% ~250-300  - difficult glottic visualization due to large tongue upon intubation at [facility] 2/25    #ECMO  VV ECMO		  Cannulation sites/dates:  Flow: 5.1		P1: 219		Delta P: 44  RPM: 3700		P2: 176		SVO2: 77  Sweep:3.5L/min:		FIO2: 1      ECMO Calculated CO: 7.28  DO2/VO2: 5.41  VO2/DO2: 0.18    -requiring high flows for oxygenation causing high delta P but D/V adequate, no lactate and SVO2 WNL but will watch closely   -Clinically perfusing. Lactate: 1.8  -ECMO sweep sighing q1hr   - Adjust circuit flow as tolerated with DO2:VO2 goal >2  -Monitor for hemolysis, chatter, evidence of recirculation, mixing      Gastrointestinal  #Diet   #Transaminitis  - tolerating tube feeds  - c/w bowel regimen   - elevated Tbili likely iso hemolysis 2/2 high ECMO flows, mild transaminitis now improving  - hepatomegaly and mild ascites noted on CT A/P - no pocket to tap  - RUQ ultrasound with steatosis    - c/w bowel regimen with senna, Miralax, will hold naloxegol and give relistor x1 today  - triglycerides 192   - nutrition following       Gu/Renal  #ELLA  - ELLA - improving, likely ATN/vascular congestion  - good UO, overall net negative -3L   - c/w intermittent diuresis, given lasix 40mg IVP today    Infectious disease  #leukocytosis  #Pneumonia   #cellulitis   - s/p cefepime, linezolid and aztihro 2/25-2/26  - continue vanco (2/26-   ) for an additional day, and c/w zosyn (2/26-  ) empirically, should cover cellulitis as well  - full RVP negative  - BCx x 2 2/24 currently NGTD, UCx 2/25 NGTD, tracheal aspirate Cx from 2/25 with normal commensal kyle   - strep pneumo Ag and urine legionella negative   - Mupiricon b/l nares for +MSSA x 5 days (2/26-  )  - BAL cultures 2/26 currently NGTD   - ? penile meatus yellow discharge pending results of GC/chamydia, HIV negative   - candidal intertrigo +/- cellulitis of the pannus - clotrimazole cream 1% BID 2/26 -+ abx as per above    Hematology/DVT ppx  #Anticoagulation   #Anemia   - c/w argatroban with goal 50-90  - mild anemia Hb 11s on admission down from 13 - check iron studies, retic, LDH, hapto  - s/p 1 u PRBC 2/26 for O2 delivery  - LE duplex 2/25 neg    Endocrine  #DM  -A1c 6.7   - obesity with metabolic syndrome  - diabetes with steroid induced hypoglycemia  - c/w decadron and taper iso ARDS  - pituitary workup, steroids as above given recent weight gain but likely volume though  - c/w ISS and FS Q6h and adjust as needed     SKin/Lines  #Access  #Cellulitis   #DTI  - L subclavian TLC inserted 2/25   - L radial artery line 2/25  - RIJ ECMO cannula 2/26 at 20 cm  - R fem ECMO cannula 2/26 at 25 cm  - candidal intertrigo +/- cellulitis of the pannus - clotrimazole cream 1% BID 2/26 -+ abx as per above  - as per nursing staff, patient with ? DTI to right buttocks   - wound consult placed     GOC   - full code   - palliative involved  and following while on ECMO   - Mother involved in care and life partner      38 yo M w/ class III obesity, pAfib (no AC), HTN, BEA (on CPAP), history of b/l papilledema attributed to pseudotumor cerebri, who is transferred from [place] on 2/26 for VV ECMO 2/2 ARDS. Patient initially presented to [place] on 2/24 for SOB and b/l LE edema. As per chart review, patient endorses some degree of SOB and b/l LE edema x 3 months with significant weight gain. As per ICU team, patient had positive sick contacts with viral URI 1 to 2 weeks PTA and since then has had worsening SOB. Patient found to be reportedly hypoxemic to 70% on RA with worsening respiratory status/secretions and somnolence in the ED. Patient was a difficult intubation (7 attempts). Despite optimal vent management, patient remained hypoxemic. Unable to prone due to obesity. Patient cannulated for VV ECMO on 2/25 and transferred to [facility] for further management.     Neuro  #Mental status   #pseudotumor cerebri  - history of pseudotumor cerebri s/p LP under fluoro in 2020 with high opening pressure with MRI 2020 also showed possible pituitary mass but unclear because of pressure effect from pseudotumor cerebri causing partially empty sella. Considering to start acetazolamide but never started  - prolactin high (30), defer checking cortisol axis given already received steroids but should be investigated, ACTH (<1.5) low but should be suppressed iso steroids  - concern for pituitary adenoma, however TSH .79 normal,  fT4 0.9 normal,   and low T3 67  - sedated with Dilaudid,  propofol, and precedex gtt. Goal to wean off propofol today  - paralytics d/c'd 2/27   - PT/OT following     Cardiovascular  #HTN   #Atrial fibrillation   - Hx of a. fib not on AC   - echo from 2024 did not demonstrate elevation of pulmonary pressures, but now with severe PH suggesting chronicity on TTE at    - [facility]ERT 2/26 showing VTI 17  - TTE on 2/25 showed LV EF 61%, enlarged RV with TAPSE 2.1cm, ePASP at least 49 (Patient had 10/2024 TTE with normal LV and RV with ePASP 14).  - troponins initially elevated, peaked at 162 however downtrended   - s/p esmolol drip for high flows now D/C'd may need to consider again if need for higher flows  - cardene gtt started for hypertension [id number], will continue for now     Pulmonary  #Pulmonary HTN  #ARDS  #Multifocal Pneumonia   #BEA   - history of diagnosed BEA/?OHS intermittent compliance to home PAP  - c/f acute on suspected chronic pulmonary hypertension in setting of possible BEA/OHS c/b pneumonia, ARDS, and pulmonary edema   - CTA chest on 2/24 negative for pulmonary embolism, notable for patchy bilateral lower lobe opacities, hepatomegaly, mild ascites, edema/induration of the abdominal pannus.  - Full RVP and BAL cultures negative    - Bronchoscopy demonstrated copious purulent secretions 2/26 and 2/27   - Dexamethasone 20mg x 5 days, then 10 mg and taper as per clinical status pneumonia/ARDS  - DuoNeb q6 hours + 3% saline will start IPV   - c/w PC/AC PIP:30  RR 14 PC: 12 PEEP:18 Fio2 50% ~250-300  - difficult glottic visualization due to large tongue upon intubation at [facility] 2/25    #ECMO  VV ECMO		  Cannulation sites/dates:  Flow: 5.1		P1: 219		Delta P: 44  RPM: 3700		P2: 176		SVO2: 77  Sweep:3.5L/min:		FIO2: 1      ECMO Calculated CO: 7.28  DO2/VO2: 5.41  VO2/DO2: 0.18    -requiring high flows for oxygenation causing high delta P but D/V adequate, no lactate and SVO2 WNL but will watch closely   -Clinically perfusing. Lactate: 1.8  -ECMO sweep sighing q1hr   - Adjust circuit flow as tolerated with DO2:VO2 goal >2  -Monitor for hemolysis, chatter, evidence of recirculation, mixing      Gastrointestinal  #Diet   #Transaminitis  - tolerating tube feeds  - c/w bowel regimen   - elevated Tbili likely iso hemolysis 2/2 high ECMO flows, mild transaminitis now improving  - hepatomegaly and mild ascites noted on CT A/P - no pocket to tap  - RUQ ultrasound with steatosis    - c/w bowel regimen with senna, Miralax, will hold naloxegol and give relistor x1 today  - triglycerides 192   - nutrition following       Gu/Renal  #ELLA  - ELLA - improving, likely ATN/vascular congestion  - good UO, overall net negative -3L/LOS  - c/w intermittent diuresis, given lasix 40mg IVP today with goal net negative -2 LIters/24 hrs    Infectious disease  #leukocytosis  #Pneumonia   #cellulitis   - s/p cefepime, linezolid and aztihro 2/25-2/26  - continue vanco (2/26-   ) for an additional day, and c/w zosyn (2/26-  ) empirically, should cover cellulitis as well  - full RVP negative  - BCx x 2 2/24 currently NGTD, UCx 2/25 NGTD, tracheal aspirate Cx from 2/25 with normal commensal kyle   - strep pneumo Ag and urine legionella negative   - Mupiricon b/l nares for +MSSA x 5 days (2/26-  )  - BAL cultures 2/26 currently NGTD   - ? penile meatus yellow discharge pending results of GC/chamydia, HIV negative   - candidal intertrigo +/- cellulitis of the pannus - clotrimazole cream 1% BID 2/26 -+ abx as per above    Hematology/DVT ppx  #Anticoagulation   #Anemia   - c/w argatroban with goal 50-90  - mild anemia Hb 11s on admission down from 13 - check iron studies, retic, LDH, hapto  - s/p 1 u PRBC 2/26 for O2 delivery  - LE duplex 2/25 neg    Endocrine  #DM  -A1c 6.7   - obesity with metabolic syndrome  - diabetes with steroid induced hypoglycemia  - c/w decadron and taper iso ARDS  - pituitary workup, steroids as above given recent weight gain but likely volume though  - c/w ISS and FS Q6h and adjust as needed     SKin/Lines  #Access  #Cellulitis   #DTI  - L subclavian TLC inserted 2/25   - L radial artery line 2/25  - RIJ ECMO cannula 2/26 at 20 cm  - R fem ECMO cannula 2/26 at 25 cm  - candidal intertrigo +/- cellulitis of the pannus - clotrimazole cream 1% BID 2/26 -+ abx as per above  - as per nursing staff, patient with ? DTI to right buttocks   - wound consult placed     GOC   - full code   - palliative involved  and following while on ECMO   - Mother involved in care and life partner

## 2025-02-28 NOTE — PROGRESS NOTE ADULT - NSPROGADDITIONALINFOA_GEN_ALL_CORE
Patient seen under the direct supervision of Dr. Reardon who was directly involved in patient care and decision making.    This note is not finalized until reviewed and signed by Attending Physician Dr. Alexys Guillory DO   Fellow - Hospice & Palliative Medicine

## 2025-02-28 NOTE — PROGRESS NOTE ADULT - CRITICAL CARE ATTENDING COMMENT
39 year old male with Class 3 obesity, pAF, HTN, BEA, and history of papilledema attributed to pseudotumor cerebri transferred here from [facility] 2/24/25 for VV-ECMO. He presented with acute hypoxemic respiratory failure and septic shock due to suspected multifocal pneumonia. There is a component of volume overload and right ventricular dysfunction as well. He was intubated 2/25/25 for development of severe ARDS.    NEURO: Sedated with propofol and hydromorphone. Cisatracurium stopped 2/27. Titrating down on propofol.  CVS: Nicardipine for hypertension. Continue diuresis today as he remains hypervolemic.  PULM: VVECMO Support: FiO2 1, Sweep 3, Flow 5. Ventilator settings: Pressure AC Pinsp 12 PEEP 16 Rate 12, FiO2 100. TV approximately 300 cc.  GI: NPO. Continue enteral feeds. Follow up with nutrition to optimize. Bowel regimen.  RENAL: Acute kidney injury. Creatinine improved. Plan to diurese as above.  ENDO: RAISS  ID: Empiric vancomycin and zosyn. Follow up culture results.  HEME: Transfused 1 unit overnight 2/26 to optimize oxygenation. No evidence of bleeding.  PPX: Argatroban while on ECMO.  Full Code  Prognosis guarded.    Total time spent on VV ECMO management was 20 minutes, separate from time spent on critical care management, procedures, and teaching.

## 2025-02-28 NOTE — PROGRESS NOTE ADULT - PROBLEM SELECTOR PLAN 1
Presented for hypoxia, reported to hypoxic despite ventilator support   Started on VV ECMO 2/25  Patient intubated and sedated, unable to participate in conversation  Father at beside shares that patient was independent in ADLs, has a career  Concern for PNA, receiving Vanc/Zosyn  Care per MICU Team Presented for hypoxia, reported to hypoxic despite ventilator support   Started on VV ECMO 2/25  Patient intubated and sedated, unable to participate in conversation  Concern for PNA, receiving Vanc/Zosyn, ARDS  C/b hypertension and afib, s/p espolol gtt  Per family pt with progressive decline in the last 6 months w/ increasing swelling and sob with cardiology f/u  Care per MICU Team

## 2025-02-28 NOTE — CHART NOTE - NSCHARTNOTEFT_GEN_A_CORE
: Chance    INDICATION: ARDS    PROCEDURE:  [x] LIMITED ECHO  [x] LIMITED CHEST  [ ] LIMITED RETROPERITONEAL  [ ] LIMITED ABDOMINAL  [ ] LIMITED DVT  [ ] NEEDLE GUIDANCE VASCULAR  [ ] NEEDLE GUIDANCE THORACENTESIS  [ ] NEEDLE GUIDANCE PARACENTESIS  [ ] NEEDLE GUIDANCE PERICARDIOCENTESIS  [ ] OTHER    FINDINGS/INTERPRETATION: Interventricular flattening in systole and diastole previously has improved. LV systolic function normal. Enlarged RV, RA, improved from prior. VTI 15 cm, improved from prior. IVC 2.4 cm, decreased from 3 cm.    Images in QPath : Mecca Fletcher    INDICATION: ARDS    PROCEDURE:  [x] LIMITED ECHO  [x] LIMITED CHEST  [ ] LIMITED RETROPERITONEAL  [ ] LIMITED ABDOMINAL  [ ] LIMITED DVT  [ ] NEEDLE GUIDANCE VASCULAR  [ ] NEEDLE GUIDANCE THORACENTESIS  [ ] NEEDLE GUIDANCE PARACENTESIS  [ ] NEEDLE GUIDANCE PERICARDIOCENTESIS  [ ] OTHER    FINDINGS/INTERPRETATION: Interventricular flattening in systole and diastole previously has improved. LV systolic function normal. Enlarged RV, RA, improved from prior. VTI 15 cm, improved from prior. IVC 2.4 cm, decreased from 3 cm.    Images in QPath : Mecca Fletcher    INDICATION: ARDS    PROCEDURE:  [x] LIMITED ECHO  [x] LIMITED CHEST  [ ] LIMITED RETROPERITONEAL  [ ] LIMITED ABDOMINAL  [ ] LIMITED DVT  [ ] NEEDLE GUIDANCE VASCULAR  [ ] NEEDLE GUIDANCE THORACENTESIS  [ ] NEEDLE GUIDANCE PARACENTESIS  [ ] NEEDLE GUIDANCE PERICARDIOCENTESIS  [ ] OTHER    FINDINGS/INTERPRETATION: Interventricular flattening in systole and diastole seen previously has improved. LV systolic function normal. Enlarged RV, RA, improved from prior. VTI 15 cm, increased from prior. IVC 2.4 cm, decreased from 3 cm. Overall findings suggest decreased RV volume and pressure overload. Lung ultrasound notable for : > R bilateral b-lines with somewhat irregular pleural, bibasilar consolidations. No significant effusion.    Images in QPath : Mecca Fletcher    INDICATION: ARDS    PROCEDURE:  [x] LIMITED ECHO  [x] LIMITED CHEST  [ ] LIMITED RETROPERITONEAL  [ ] LIMITED ABDOMINAL  [ ] LIMITED DVT  [ ] NEEDLE GUIDANCE VASCULAR  [ ] NEEDLE GUIDANCE THORACENTESIS  [ ] NEEDLE GUIDANCE PARACENTESIS  [ ] NEEDLE GUIDANCE PERICARDIOCENTESIS  [ ] OTHER    FINDINGS/INTERPRETATION: Interventricular flattening in systole and diastole seen previously has improved. LV systolic function normal. Enlarged RV, RA, improved from prior. VTI 15 cm, increased from prior. IVC 2.4 cm, decreased from 3 cm. Overall findings suggest decreased degree of RV volume and pressure overload. Lung ultrasound notable for L > R bilateral b-lines with somewhat irregular pleura, bibasilar consolidations. No significant effusion.    Images in QPath : Mecca Fletcher    INDICATION: ARDS    PROCEDURE:  [x] LIMITED ECHO  [x] LIMITED CHEST    FINDINGS/INTERPRETATION: Interventricular flattening in systole and diastole seen previously has improved. LV systolic function normal. Enlarged RV, RA, improved from prior. VTI 15 cm, increased from prior. IVC 2.4 cm, decreased from 3 cm. Overall findings suggest decreased degree of RV volume and pressure overload. Lung ultrasound notable for L > R bilateral b-lines with somewhat irregular pleura, bibasilar consolidations. No significant effusion.    Images in QPath    Attending Attestation:    I was present during the key portions of the procedure and immediately available during the entire procedure.    Judd Rice MD  Attending  Pulmonary & Critical Care Medicine

## 2025-02-28 NOTE — PROGRESS NOTE ADULT - ASSESSMENT
38 yo M w/ class III obesity, pAfib (no AC), HTN, BEA (on CPAP), history of b/l papilledema attributed to pseudotumor cerebri s/p LP in 2024 with very high opening pressure, who is transferred for VV ECMO for ARDS and severe hypoxia. Patient cannulated for VV ECMO on 2/25 and transferred to [facility] for further management. Palliative care consulted for goals of care and complex medical decision making; patient currently on ECMO.

## 2025-02-28 NOTE — PROGRESS NOTE ADULT - PROBLEM SELECTOR PLAN 3
Palliative care consulted for goals of care and complex medical decision making.    In the event of newly developing, evolving, or worsening symptoms, please contact the Palliative Medicine team via pager (if the patient is at [facility] #[phone] or if the patient is at [facility] #[phone]) The Geriatric and Palliative Medicine service has coverage 24 hours a day/ 7 days a week to provide medical recommendations regarding symptom management needs via telephone.

## 2025-02-28 NOTE — PROGRESS NOTE ADULT - SUBJECTIVE AND OBJECTIVE BOX
SUBJECTIVE AND OBJECTIVE:  Indication for Geriatrics and Palliative Care Services/INTERVAL HPI:  38 yo M w/ class III obesity, pAfib (no AC), HTN, BEA (on CPAP), history of b/l papilledema attributed to pseudotumor cerebri s/p LP in 2024 with very high opening pressure, who is transferred for VV ECMO for ARDS and severe hypoxia. Patient cannulated for VV ECMO on 2/25 and transferred to [facility] for further management. Palliative care consulted for goals of care and complex medical decision making; patient currently on ECMO.    OVERNIGHT EVENTS:    Patient seen at bedside today afternoon by medical team. Patient current sedated and intubated, does not appear overtly to be in distress. Patient later accompanied by Father who shared that patient and younger brother Noble live together with their mother. Father states that he is  from the patient's mother. He also states that the patient's girlfriend does not live with him permanently, they do not have any children and they do not share finances. Prior to current hospitalization, patient was reported to be active, independent in ADLs and had a career. Father also states that patient is Religious.    Allergies    No Known Allergies    Intolerances    MEDICATIONS  (STANDING):  albuterol/ipratropium for Nebulization 3 milliLiter(s) Nebulizer every 6 hours  argatroban Infusion 1.1 MICROgram(s)/kG/Min (15.8 mL/Hr) IV Continuous <Continuous>  chlorhexidine 0.12% Liquid 15 milliLiter(s) Oral Mucosa every 12 hours  chlorhexidine 2% Cloths 1 Application(s) Topical <User Schedule>  clotrimazole 1% Cream 1 Application(s) Topical two times a day  dexAMETHasone  IVPB 20 milliGRAM(s) IV Intermittent daily  dexMEDEtomidine Infusion 0.5 MICROgram(s)/kG/Hr (30.1 mL/Hr) IV Continuous <Continuous>  dextrose 50% Injectable 25 Gram(s) IV Push once  dextrose 50% Injectable 12.5 Gram(s) IV Push once  dextrose 50% Injectable 25 Gram(s) IV Push once  glucagon  Injectable 1 milliGRAM(s) IntraMuscular once  HYDROmorphone Infusion. 1 mG/Hr (1 mL/Hr) IV Continuous <Continuous>  insulin lispro (ADMELOG) corrective regimen sliding scale   SubCutaneous every 6 hours  multivitamin/minerals/iron Oral Solution (CENTRUM) 15 milliLiter(s) Oral daily  mupirocin 2% Ointment 1 Application(s) Topical two times a day  niCARdipine Infusion 5 mG/Hr (25 mL/Hr) IV Continuous <Continuous>  pantoprazole  Injectable 40 milliGRAM(s) IV Push daily  petrolatum Ophthalmic Ointment 1 Application(s) Both EYES every 12 hours  piperacillin/tazobactam IVPB.. 4.5 Gram(s) IV Intermittent every 8 hours  polyethylene glycol 3350 17 Gram(s) Oral two times a day  propofol Infusion 50 MICROgram(s)/kG/Min (72.2 mL/Hr) IV Continuous <Continuous>  senna Syrup 10 milliLiter(s) Oral two times a day  sodium chloride 3%  Inhalation 4 milliLiter(s) Inhalation every 6 hours  vancomycin  IVPB      vancomycin  IVPB 1250 milliGRAM(s) IV Intermittent every 8 hours    MEDICATIONS  (PRN):      ITEMS UNCHECKED ARE NOT PRESENT    PRESENT SYMPTOMS: [X]Unable to self-report - see  CPOT, PAINADS, RDOS below  Source if other than patient:  [ ]Family   [ ]Team     Pain:  [ ]yes [ ]no  QOL impact -   Location -                    Aggravating factors -  Quality -  Radiation -  Timing-  Severity (0-10 scale):  Minimal acceptable level (0-10 scale):     PCSSQ[Palliative Care Spiritual Screening Question]   Severity (0-10):  Score of 4 or > indicate consideration of Chaplaincy referral.  Chaplaincy Referral: [ ] yes [ ] refused [ ] following    Caregiver [place]? : [X] yes [ ] no [ ] deferred:  Social work referral [X] Patient & Family Centered Care Referral [ ]     Anticipatory Grief Present?: [X] yes [ ] no  [ ] deferred: Social work referral [X]  Patient & Family Centered Care Referral [ ]     SYMPTOMS: Unable to obtain due to medical acuity  Dyspnea:                           [ ]Mild [ ]Moderate [ ]Severe  Anxiety:                             [ ]Mild [ ]Moderate [ ]Severe  Fatigue:                             [ ]Mild [ ]Moderate [ ]Severe  Nausea/Vomiting:              [ ]Mild [ ]Moderate [ ]Severe  Loss of appetite:                [ ]Mild [ ]Moderate [ ]Severe  Constipation:                     [ ]Mild [ ]Moderate [ ]Severe    Other Symptoms:  [ ]All other review of systems negative     PHYSICAL EXAM:  Vital Signs Last 24 Hrs  T(C): 36.3 (28 Feb 2025 08:00), Max: 36.6 (27 Feb 2025 16:00)  T(F): 97.3 (28 Feb 2025 08:00), Max: 97.9 (27 Feb 2025 16:00)  HR: 71 (28 Feb 2025 14:00) (61 - 81)  BP: --  BP(mean): --  RR: 17 (28 Feb 2025 14:00) (13 - 23)  SpO2: 94% (28 Feb 2025 14:00) (89% - 95%)    Parameters below as of 28 Feb 2025 14:00  Patient On (Oxygen Delivery Method): ventilator    O2 Concentration (%): 50 I&O's Summary    27 Feb 2025 07:01  -  28 Feb 2025 07:00  --------------------------------------------------------  IN: 4714.1 mL / OUT: 7505 mL / NET: -2790.9 mL    28 Feb 2025 07:01  -  28 Feb 2025 14:47  --------------------------------------------------------  IN: 1344.2 mL / OUT: 2385 mL / NET: -1040.8 mL       GENERAL: [ ]Cachexia    [ ]Alert  [ ]Oriented x   [ ]Lethargic  [ ]Unarousable  [ ]Verbal  [X]Non-Verbal  Behavioral:   [ ] Anxiety  [ ] Delirium [ ] Agitation [X] Other Sedated  HEENT:  [ ]Normal   [ ]Dry mouth   [ ]ET Tube/Trach  [ ]Oral lesions  PULMONARY: - Intubated   [X]Clear [ ]Tachypnea  [ ]Audible excessive secretions   [ ]Rhonchi        [ ]Right [ ]Left [ ]Bilateral  [ ]Crackles        [ ]Right [ ]Left [ ]Bilateral  [ ]Wheezing     [ ]Right [ ]Left [ ]Bilateral  [ ]Diminished breath sounds [ ]right [ ]left [ ]bilateral  CARDIOVASCULAR:    [X]Regular [ ]Irregular [ ]Tachy  [ ]Parviz [ ]Murmur [ ]Other  GASTROINTESTINAL:  [X]Soft  [ ]Distended   [ ]+BS  [ ]Non tender [ ]Tender  [ ]Other [ ]PEG [ ]OGT/ NGT  Last BM: Unknown  GENITOURINARY:  [ ]Normal [ ] Incontinent   [ ]Oliguria/Anuria   [ ]Winslow  MUSCULOSKELETAL:   [ ]Normal   [ ]Weakness  [X]Bed/Wheelchair bound [ ]Edema  NEUROLOGIC:   [ ]No focal deficits  [ ]Cognitive impairment  [ ]Dysphagia [ ]Dysarthria [ ]Paresis [X]Other Sedated   SKIN:   [ ]Normal  [ ]Rash  [X]Other - Please see RN documentation which I have reviewed  [ ]Pressure ulcer(s)       Present on admission [ ]y [ ]n    CRITICAL CARE:  [ ]Shock Present  [ ]Septic [ ]Cardiogenic [ ]Neurologic [ ]Hypovolemic  [ ]Vasopressors [ ]Inotropes  [ ]Respiratory failure present [ ]Mechanical Ventilation [ ]Non-invasive ventilatory support [ ]High-Flow Mode: AC/ CMV (Assist Control/ Continuous Mandatory Ventilation), RR (machine): 14, FiO2: 50, PEEP: 18, ITime: 1, MAP: 22, PC: 12, PIP: 31  [ ]Acute  [ ]Chronic [ ]Hypoxic  [ ]Hypercarbic [ ]Other  [X]Other organ failure: Lung     LABS:                        12.5   18.93 )-----------( 127      ( 28 Feb 2025 11:28 )             44.4   02-28    142  |  104  |  27[H]  ----------------------------<  181[H]  4.1   |  27  |  0.92    Ca    8.0[L]      28 Feb 2025 11:15  Phos  4.1     02-28  Mg     2.00     02-28    TPro  6.8  /  Alb  2.7[L]  /  TBili  1.4[H]  /  DBili  x   /  AST  133[H]  /  ALT  66[H]  /  AlkPhos  59  02-28  PT/INR - ( 28 Feb 2025 11:15 )   PT: 27.9 sec;   INR: 2.36 ratio         PTT - ( 28 Feb 2025 11:15 )  PTT:56.9 sec    Urinalysis Basic - ( 28 Feb 2025 11:15 )    Color: x / Appearance: x / SG: x / pH: x  Gluc: 181 mg/dL / Ketone: x  / Bili: x / Urobili: x   Blood: x / Protein: x / Nitrite: x   Leuk Esterase: x / RBC: x / WBC x   Sq Epi: x / Non Sq Epi: x / Bacteria: x      RADIOLOGY & ADDITIONAL STUDIES:  < from: Xray Chest 1 View- PORTABLE-Urgent (Xray Chest 1 View- PORTABLE-Urgent .) (02.26.25 @ 07:42) >  IMPRESSION:  Follow-up with enteric tube in the stomach with endotracheal tube and   ECMO catheter as seen before.  Small right effusion with fleeting opacities in the lungs probably   atelectasis in this patient with ARDS.    < end of copied text >      Protein Calorie Malnutrition Present: [ ]mild [ ]moderate [ ]severe [ ]underweight [ ]morbid obesity  https://www.andeal.org/vault/2440/web/files/ONC/Table_Clinical%20Characteristics%20to%20Document%20Malnutrition-White%20JV%20et%20al%484087.pdf    Height (cm): 170.2 (02-26-25 @ 01:48), 170.2 (02-25-25 @ 16:00), 177.8 (10-05-24 @ 01:35)  Weight (kg): 240.8 (02-26-25 @ 08:00), 239.2 (02-25-25 @ 20:00), 172.4 (10-05-24 @ 01:35)  BMI (kg/m2): 83.1 (02-26-25 @ 08:00), 82.6 (02-26-25 @ 01:48), 82.6 (02-25-25 @ 20:00)    [ ]PPSV2 < or = 30%  [ ]significant weight loss [ ]poor nutritional intake [ ]anasarca[ ]Artificial Nutrition    Other REFERRALS:  [ ]Hospice  [ ]Child Life  [ ]Social Work  [ ]Case management [ ]Holistic Therapy     Palliative Performance Scale:  http://[facility].org/files/news/palliative_performance_scale_ppsv2.pdf  (Ctrl +  left click to view)  Respiratory Distress Observation Tool:  https://homecareinformation.net/handouts/hen/Respiratory_Distress_Observation_Scale.pdf (Ctrl +  left click to view)  PAINAD Score:  http://geriatrictoolkit.[facility]/cog/painad.pdf (Ctrl +  left click to view)       SUBJECTIVE AND OBJECTIVE:  Indication for Geriatrics and Palliative Care Services/INTERVAL HPI:  40 yo M w/ class III obesity, pAfib (no AC), HTN, BEA (on CPAP), history of b/l papilledema attributed to pseudotumor cerebri s/p LP in 2024 with very high opening pressure, who is transferred for VV ECMO for ARDS and severe hypoxia. Patient cannulated for VV ECMO on 2/25 and transferred to [facility] for further management. Palliative care consulted for goals of care and complex medical decision making; patient currently on ECMO.    OVERNIGHT EVENTS:    Patient seen at bedside today afternoon by medical team. Patient current sedated and intubated, does not appear overtly to be in distress. Patient later accompanied by Father who shared that patient and younger brother Noble live together with their mother. Father states that he is  from the patient's mother. He also states that the patient's girlfriend does not live with him permanently, they do not have any children and they do not share finances. Prior to current hospitalization, patient was reported to be active, independent in ADLs and had a career. Father also states that patient is Sikh.    Later called and spoke to patient's mom Erica who provided further collateral hx:   Pt resides with his mother and younger brother Noble and his girlfriend Brea Mari. His parents are  however have a friendly relationship. 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 Mother stated that he was hospitalized for 3 days at [facility] however she stated her son did not provide her with updates on his medical status however was seeking medical care.   Mother states she has a lot of support from her family including pt's father. Her sister Mili is a major source of support for her. She states she is understandably sad and worried about her son and is trying to keep her strength to be able to transfer strength to her son. She shared that she lost her daughter 11 years ago and Sunny suffered from depression, "holding everything in." He gained weight around this time.   Mother understands that her son is receiving full ICU support and every effort is being made to help her son during this difficult time.         Allergies    No Known Allergies    Intolerances    MEDICATIONS  (STANDING):  albuterol/ipratropium for Nebulization 3 milliLiter(s) Nebulizer every 6 hours  argatroban Infusion 1.1 MICROgram(s)/kG/Min (15.8 mL/Hr) IV Continuous <Continuous>  chlorhexidine 0.12% Liquid 15 milliLiter(s) Oral Mucosa every 12 hours  chlorhexidine 2% Cloths 1 Application(s) Topical <User Schedule>  clotrimazole 1% Cream 1 Application(s) Topical two times a day  dexAMETHasone  IVPB 20 milliGRAM(s) IV Intermittent daily  dexMEDEtomidine Infusion 0.5 MICROgram(s)/kG/Hr (30.1 mL/Hr) IV Continuous <Continuous>  dextrose 50% Injectable 25 Gram(s) IV Push once  dextrose 50% Injectable 12.5 Gram(s) IV Push once  dextrose 50% Injectable 25 Gram(s) IV Push once  glucagon  Injectable 1 milliGRAM(s) IntraMuscular once  HYDROmorphone Infusion. 1 mG/Hr (1 mL/Hr) IV Continuous <Continuous>  insulin lispro (ADMELOG) corrective regimen sliding scale   SubCutaneous every 6 hours  multivitamin/minerals/iron Oral Solution (CENTRUM) 15 milliLiter(s) Oral daily  mupirocin 2% Ointment 1 Application(s) Topical two times a day  niCARdipine Infusion 5 mG/Hr (25 mL/Hr) IV Continuous <Continuous>  pantoprazole  Injectable 40 milliGRAM(s) IV Push daily  petrolatum Ophthalmic Ointment 1 Application(s) Both EYES every 12 hours  piperacillin/tazobactam IVPB.. 4.5 Gram(s) IV Intermittent every 8 hours  polyethylene glycol 3350 17 Gram(s) Oral two times a day  propofol Infusion 50 MICROgram(s)/kG/Min (72.2 mL/Hr) IV Continuous <Continuous>  senna Syrup 10 milliLiter(s) Oral two times a day  sodium chloride 3%  Inhalation 4 milliLiter(s) Inhalation every 6 hours  vancomycin  IVPB      vancomycin  IVPB 1250 milliGRAM(s) IV Intermittent every 8 hours    MEDICATIONS  (PRN):      ITEMS UNCHECKED ARE NOT PRESENT    PRESENT SYMPTOMS: [X]Unable to self-report - see  CPOT, PAINADS, RDOS below  Source if other than patient:  [ ]Family   [ ]Team     Pain:  [ ]yes [ ]no  QOL impact -   Location -                    Aggravating factors -  Quality -  Radiation -  Timing-  Severity (0-10 scale):  Minimal acceptable level (0-10 scale):     PCSSQ[Palliative Care Spiritual Screening Question]   Severity (0-10):  Score of 4 or > indicate consideration of Chaplaincy referral.  Chaplaincy Referral: [ ] yes [ ] refused [ ] following    Caregiver [place]? : [X] yes [ ] no [ ] deferred:  Social work referral [X] Patient & Family Centered Care Referral [ ]     Anticipatory Grief Present?: [X] yes [ ] no  [ ] deferred: Social work referral [X]  Patient & Family Centered Care Referral [ ]     SYMPTOMS: Unable to obtain due to medical acuity  Dyspnea:                           [ ]Mild [ ]Moderate [ ]Severe  Anxiety:                             [ ]Mild [ ]Moderate [ ]Severe  Fatigue:                             [ ]Mild [ ]Moderate [ ]Severe  Nausea/Vomiting:              [ ]Mild [ ]Moderate [ ]Severe  Loss of appetite:                [ ]Mild [ ]Moderate [ ]Severe  Constipation:                     [ ]Mild [ ]Moderate [ ]Severe    Other Symptoms:  [ ]All other review of systems negative     PHYSICAL EXAM:  Vital Signs Last 24 Hrs  T(C): 36.3 (28 Feb 2025 08:00), Max: 36.6 (27 Feb 2025 16:00)  T(F): 97.3 (28 Feb 2025 08:00), Max: 97.9 (27 Feb 2025 16:00)  HR: 71 (28 Feb 2025 14:00) (61 - 81)  BP: --  BP(mean): --  RR: 17 (28 Feb 2025 14:00) (13 - 23)  SpO2: 94% (28 Feb 2025 14:00) (89% - 95%)    Parameters below as of 28 Feb 2025 14:00  Patient On (Oxygen Delivery Method): ventilator    O2 Concentration (%): 50 I&O's Summary    27 Feb 2025 07:01  -  28 Feb 2025 07:00  --------------------------------------------------------  IN: 4714.1 mL / OUT: 7505 mL / NET: -2790.9 mL    28 Feb 2025 07:01  -  28 Feb 2025 14:47  --------------------------------------------------------  IN: 1344.2 mL / OUT: 2385 mL / NET: -1040.8 mL       GENERAL: [ ]Cachexia    [ ]Alert  [ ]Oriented x   [ ]Lethargic  [ ]Unarousable  [ ]Verbal  [X]Non-Verbal  Behavioral:   [ ] Anxiety  [ ] Delirium [ ] Agitation [X] Other Sedated  HEENT:  [ ]Normal   [ ]Dry mouth   [ ]ET Tube/Trach  [ ]Oral lesions  PULMONARY: - Intubated   [X]Clear [ ]Tachypnea  [ ]Audible excessive secretions   [ ]Rhonchi        [ ]Right [ ]Left [ ]Bilateral  [ ]Crackles        [ ]Right [ ]Left [ ]Bilateral  [ ]Wheezing     [ ]Right [ ]Left [ ]Bilateral  [ ]Diminished breath sounds [ ]right [ ]left [ ]bilateral  CARDIOVASCULAR:    [X]Regular [ ]Irregular [ ]Tachy  [ ]Parviz [ ]Murmur [ ]Other  GASTROINTESTINAL:  [X]Soft  [ ]Distended   [ ]+BS  [ ]Non tender [ ]Tender  [ ]Other [ ]PEG [ ]OGT/ NGT  Last BM: Unknown  GENITOURINARY:  [ ]Normal [ ] Incontinent   [ ]Oliguria/Anuria   [ ]Winslow  MUSCULOSKELETAL:   [ ]Normal   [ ]Weakness  [X]Bed/Wheelchair bound [ ]Edema  NEUROLOGIC:   [ ]No focal deficits  [ ]Cognitive impairment  [ ]Dysphagia [ ]Dysarthria [ ]Paresis [X]Other Sedated   SKIN:   [ ]Normal  [ ]Rash  [X]Other - Please see RN documentation which I have reviewed  [ ]Pressure ulcer(s)       Present on admission [ ]y [ ]n    CRITICAL CARE:  [ ]Shock Present  [ ]Septic [ ]Cardiogenic [ ]Neurologic [ ]Hypovolemic  [ ]Vasopressors [ ]Inotropes  [ ]Respiratory failure present [ ]Mechanical Ventilation [ ]Non-invasive ventilatory support [ ]High-Flow Mode: AC/ CMV (Assist Control/ Continuous Mandatory Ventilation), RR (machine): 14, FiO2: 50, PEEP: 18, ITime: 1, MAP: 22, PC: 12, PIP: 31  [ ]Acute  [ ]Chronic [ ]Hypoxic  [ ]Hypercarbic [ ]Other  [X]Other organ failure: Lung     LABS:                        12.5   18.93 )-----------( 127      ( 28 Feb 2025 11:28 )             44.4   02-28    142  |  104  |  27[H]  ----------------------------<  181[H]  4.1   |  27  |  0.92    Ca    8.0[L]      28 Feb 2025 11:15  Phos  4.1     02-28  Mg     2.00     02-28    TPro  6.8  /  Alb  2.7[L]  /  TBili  1.4[H]  /  DBili  x   /  AST  133[H]  /  ALT  66[H]  /  AlkPhos  59  02-28  PT/INR - ( 28 Feb 2025 11:15 )   PT: 27.9 sec;   INR: 2.36 ratio         PTT - ( 28 Feb 2025 11:15 )  PTT:56.9 sec    Urinalysis Basic - ( 28 Feb 2025 11:15 )    Color: x / Appearance: x / SG: x / pH: x  Gluc: 181 mg/dL / Ketone: x  / Bili: x / Urobili: x   Blood: x / Protein: x / Nitrite: x   Leuk Esterase: x / RBC: x / WBC x   Sq Epi: x / Non Sq Epi: x / Bacteria: x      RADIOLOGY & ADDITIONAL STUDIES:  < from: Xray Chest 1 View- PORTABLE-Urgent (Xray Chest 1 View- PORTABLE-Urgent .) (02.26.25 @ 07:42) >  IMPRESSION:  Follow-up with enteric tube in the stomach with endotracheal tube and   ECMO catheter as seen before.  Small right effusion with fleeting opacities in the lungs probably   atelectasis in this patient with ARDS.    < end of copied text >      Protein Calorie Malnutrition Present: [ ]mild [ ]moderate [ ]severe [ ]underweight [ ]morbid obesity  https://www.andeal.org/vault/9310/web/files/ONC/Table_Clinical%20Characteristics%20to%20Document%20Malnutrition-White%20JV%20et%20al%202012.pdf    Height (cm): 170.2 (02-26-25 @ 01:48), 170.2 (02-25-25 @ 16:00), 177.8 (10-05-24 @ 01:35)  Weight (kg): 240.8 (02-26-25 @ 08:00), 239.2 (02-25-25 @ 20:00), 172.4 (10-05-24 @ 01:35)  BMI (kg/m2): 83.1 (02-26-25 @ 08:00), 82.6 (02-26-25 @ 01:48), 82.6 (02-25-25 @ 20:00)    [ ]PPSV2 < or = 30%  [ ]significant weight loss [ ]poor nutritional intake [ ]anasarca[ ]Artificial Nutrition    Other REFERRALS:  [ ]Hospice  [ ]Child Life  [ ]Social Work  [ ]Case management [ ]Holistic Therapy     Palliative Performance Scale:  http://npcrc.org/files/news/palliative_performance_scale_ppsv2.pdf  (Ctrl +  left click to view)  Respiratory Distress Observation Tool:  https://homecareinformation.net/handouts/hen/Respiratory_Distress_Observation_Scale.pdf (Ctrl +  left click to view)  PAINAD Score:  http://geriatrictoolkit.[place].[facility]/cog/painad.pdf (Ctrl +  left click to view)

## 2025-02-28 NOTE — PROGRESS NOTE ADULT - PROBLEM SELECTOR PLAN 2
Per father, patient's girlfriend is not living with patient, no children together, not sharing finances   Patient is currently patient is living with mother Erica LNOK: both parents: Erica Quintanilla (mother) [phone], Sunny (Dad) [phone]; Erica gave permission for team to speak with Sunny (dad) and also to Mili Beaver  (pt's aunt /Erica's sister)   Pt also has GF Brea Mari but she would not be legal decision maker;     Goal currently for continued treatment in the ICU with ECMO with hope for improvements;    Palliative team continues to follow for ongoing support for patient on ECMO circuit. Discussed case with MICU team caring for patient who are continuing to provide communication with interdisciplinary teams and patient's family regarding patient's clinical status and medical updates.

## 2025-02-28 NOTE — CHART NOTE - NSCHARTNOTEFT_GEN_A_CORE
Palliative Care Biopsychosocial Assessment:     Discussed patient in morning Interdisciplinary rounds   Patient identified for need of assessment by Palliative Care . Referral to palliative care team for complex decision making and symptom management in setting of VV ECMO. Introduced palliative care team as an extra layer of support for the patient and family to provide an opportunity for the family to share their understanding/concerns regarding their loved one on the ECMO circuit. The goal is to elicit their understanding of the complexities of this form of life support, ensuring that the families have appropriate insight and information. Assessed family's support system and provided information regarding resources for support including: chaplaincy, child life, and caregiver support if necessary. Pt was transferred from [facility] after ECMO.   Reached out to pt's mother (Setswana speaker) using language line solutions ID# [id number].    Patient Mental Status:   [   ]   alert     [   ]  oriented   [    ] confused   [   ] lethargic  [   ] non-responsive  [ X  ]Unable to assess secondary to sedation and intubation       Patient Coping Status:     [   ] coping well    [   ] coping with  some difficulty    [   ] difficulty coping   [ X  ] Unable to assess due to mental status                                                 Patient Emotional Status:   [   ] anxious   [   ] depressed    [   ] overwhelmed    [   ] angry   [   ]accepting    [   ] not accepting  [ X  ]Unable to assess        Advance Directives:   [ X  ]  Health Care Surrogate:  Erica Quintanilla (mother) [phone] and Sunny Quintanilla (father)  [phone]                         [   ]    Health Care Proxy:  [   ]    MOLST  [   ]    Living Will  [   ]    DNR  [   ]    DNI    Social Support:  Evaluated patients social support system. Pt resides with his mother and younger brother Noble and his girlfriend Brea Mari. His parents are  however have a friendly relationship. 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 Mother stated that he was hospitalized for 3 days at [facility] however she stated her son did not provide her with updates on his medical status however was seeking medical care.   Mother states she has a lot of support from her family including pt's father. Her sister Mili is a major source of support for her. She states she is understandably sad and worried about her son and is trying to keep her strength to be able to transfer strength to her son. She shared that she lost her daughter 11 years ago and Sunny suffered from depression, "holding everything in." He gained weight around this time.   Mother understands that her son is receiving full ICU support and every effort is being made to help her son during this difficult time.       Patient Needs:  [   ] Supportive Counseling      [   ] Family Meeting     [   ] Education     [   ] Advance Care Planning         Caregiver needs:   [ X  ]  Supportive Counseling    [   ] Family Conference     [   ] Education      Referral:    [   ]  Community Resources    [   ]  Cancer Support Group     [   ]  Hospice     [   ]  Bereavement support     [ X  ]  Pastoral Care      [   ]  Live On [place]    [   ]  Child Life Services   [  X ]Caregiver Support Group   [  ] Supportive Oncology  [   ] Behavioral Health   [   ]  No needs identified no referrals made      Palliative Care  will continue to remain available as needed for continued psychosocial and emotional support.  Emphatic and active listening provided.         Karen Britton LMSW  Palliative Care   Geriatrics and Palliative Care Division at [facility]  Pager #[phone] [id number] [phone] Palliative Care Biopsychosocial Assessment:     Discussed patient in morning Interdisciplinary rounds   Patient identified for need of assessment by Palliative Care . Referral to palliative care team for complex decision making and symptom management in setting of VV ECMO. Introduced palliative care team as an extra layer of support for the patient and family to provide an opportunity for the family to share their understanding/concerns regarding their loved one on the ECMO circuit. The goal is to elicit their understanding of the complexities of this form of life support, ensuring that the families have appropriate insight and information. Assessed family's support system and provided information regarding resources for support including: chaplaincy, child life, and caregiver support if necessary. Pt was transferred from [facility] after ECMO.   Reached out to pt's mother (Sami speaker) using language line solutions ID# [id number].    Patient Mental Status:   [   ]   alert     [   ]  oriented   [    ] confused   [   ] lethargic  [   ] non-responsive  [ X  ]Unable to assess secondary to sedation and intubation       Patient Coping Status:     [   ] coping well    [   ] coping with  some difficulty    [   ] difficulty coping   [ X  ] Unable to assess due to mental status                                                 Patient Emotional Status:   [   ] anxious   [   ] depressed    [   ] overwhelmed    [   ] angry   [   ]accepting    [   ] not accepting  [ X  ]Unable to assess        Advance Directives:   [ X  ]  Health Care Surrogate:  Erica Quintanilla (mother) [phone] and Sunny Quintanilla (father)  [phone]                         [   ]    Health Care Proxy:  [   ]    MOLST  [   ]    Living Will  [   ]    DNR  [   ]    DNI    Social Support:  Evaluated patients social support system. Pt resides with his mother and younger brother Noble and his girlfriend Brea Mari. His parents are  however have a friendly relationship. 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 Mother stated that he was hospitalized for 3 days at [facility] however she stated her son did not provide her with updates on his medical status however was seeking medical care.   Mother states she has a lot of support from her family including pt's father. Her sister Mili is a major source of support for her. She states she is understandably sad and worried about her son and is trying to keep her strength to be able to transfer strength to her son. She shared that she lost her daughter 11 years ago and Sunny suffered from depression, "holding everything in." He gained weight around this time.   Mother understands that her son is receiving full ICU support and every effort is being made to help her son during this difficult time.       Patient Needs:  [   ] Supportive Counseling      [   ] Family Meeting     [   ] Education     [   ] Advance Care Planning         Caregiver needs:   [ X  ]  Supportive Counseling    [   ] Family Conference     [   ] Education      Referral:    [   ]  Community Resources    [   ]  Cancer Support Group     [   ]  Hospice     [   ]  Bereavement support     [ X  ]  Pastoral Care      [   ]  [place]    [   ]  Child Life Services   [  X ]Caregiver Support Group   [  ] Supportive Oncology  [   ] Behavioral Health   [   ]  No needs identified no referrals made      Palliative Care  will continue to remain available as needed for continued psychosocial and emotional support.  Emphatic and active listening provided.     Follow-up:  [  X ] Weekly Follow up for psycho-social support        aKren Britton LMSW  Palliative Care   Geriatrics and Palliative Care Division at [facility]  Pager #[phone] [id number] [phone]

## 2025-03-01 LAB
ALBUMIN SERPL ELPH-MCNC: 2.4 G/DL — LOW (ref 3.3–5)
ALBUMIN SERPL ELPH-MCNC: 2.4 G/DL — LOW (ref 3.3–5)
ALBUMIN SERPL ELPH-MCNC: 2.5 G/DL — LOW (ref 3.3–5)
ALBUMIN SERPL ELPH-MCNC: 2.6 G/DL — LOW (ref 3.3–5)
ALP SERPL-CCNC: 55 U/L — SIGNIFICANT CHANGE UP (ref 40–120)
ALP SERPL-CCNC: 56 U/L — SIGNIFICANT CHANGE UP (ref 40–120)
ALT FLD-CCNC: 63 U/L — HIGH (ref 4–41)
ALT FLD-CCNC: 64 U/L — HIGH (ref 4–41)
ANION GAP SERPL CALC-SCNC: 10 MMOL/L — SIGNIFICANT CHANGE UP (ref 7–14)
ANION GAP SERPL CALC-SCNC: 11 MMOL/L — SIGNIFICANT CHANGE UP (ref 7–14)
ANION GAP SERPL CALC-SCNC: 8 MMOL/L — SIGNIFICANT CHANGE UP (ref 7–14)
ANION GAP SERPL CALC-SCNC: 9 MMOL/L — SIGNIFICANT CHANGE UP (ref 7–14)
APTT BLD: 62.5 SEC — HIGH (ref 24.5–35.6)
APTT BLD: 63.5 SEC — HIGH (ref 24.5–35.6)
APTT BLD: 64.2 SEC — HIGH (ref 24.5–35.6)
APTT BLD: 66.3 SEC — HIGH (ref 24.5–35.6)
AST SERPL-CCNC: 76 U/L — HIGH (ref 4–40)
AST SERPL-CCNC: 83 U/L — HIGH (ref 4–40)
AST SERPL-CCNC: 89 U/L — HIGH (ref 4–40)
AST SERPL-CCNC: 91 U/L — HIGH (ref 4–40)
BASOPHILS # BLD AUTO: 0.02 K/UL — SIGNIFICANT CHANGE UP (ref 0–0.2)
BASOPHILS # BLD AUTO: 0.03 K/UL — SIGNIFICANT CHANGE UP (ref 0–0.2)
BASOPHILS NFR BLD AUTO: 0.1 % — SIGNIFICANT CHANGE UP (ref 0–2)
BILIRUB SERPL-MCNC: 1.1 MG/DL — SIGNIFICANT CHANGE UP (ref 0.2–1.2)
BILIRUB SERPL-MCNC: 1.2 MG/DL — SIGNIFICANT CHANGE UP (ref 0.2–1.2)
BILIRUB SERPL-MCNC: 1.2 MG/DL — SIGNIFICANT CHANGE UP (ref 0.2–1.2)
BILIRUB SERPL-MCNC: 1.3 MG/DL — HIGH (ref 0.2–1.2)
BLOOD GAS ARTERIAL - LYTES,HGB,ICA,LACT RESULT: SIGNIFICANT CHANGE UP
BLOOD GAS ECMO POST MEMBRANE - ARTERIAL RESULT: SIGNIFICANT CHANGE UP
BLOOD GAS ECMO PRE MEMBRANE - VENOUS RESULT: SIGNIFICANT CHANGE UP
BUN SERPL-MCNC: 30 MG/DL — HIGH (ref 7–23)
BUN SERPL-MCNC: 31 MG/DL — HIGH (ref 7–23)
BUN SERPL-MCNC: 32 MG/DL — HIGH (ref 7–23)
BUN SERPL-MCNC: 33 MG/DL — HIGH (ref 7–23)
CA-I BLD-SCNC: 1.13 MMOL/L — LOW (ref 1.15–1.29)
CA-I BLD-SCNC: 1.14 MMOL/L — LOW (ref 1.15–1.29)
CALCIUM SERPL-MCNC: 8 MG/DL — LOW (ref 8.4–10.5)
CALCIUM SERPL-MCNC: 8.1 MG/DL — LOW (ref 8.4–10.5)
CALCIUM SERPL-MCNC: 8.2 MG/DL — LOW (ref 8.4–10.5)
CALCIUM SERPL-MCNC: 8.3 MG/DL — LOW (ref 8.4–10.5)
CHLORIDE SERPL-SCNC: 103 MMOL/L — SIGNIFICANT CHANGE UP (ref 98–107)
CHLORIDE SERPL-SCNC: 104 MMOL/L — SIGNIFICANT CHANGE UP (ref 98–107)
CHLORIDE SERPL-SCNC: 106 MMOL/L — SIGNIFICANT CHANGE UP (ref 98–107)
CHLORIDE SERPL-SCNC: 106 MMOL/L — SIGNIFICANT CHANGE UP (ref 98–107)
CO2 SERPL-SCNC: 24 MMOL/L — SIGNIFICANT CHANGE UP (ref 22–31)
CO2 SERPL-SCNC: 26 MMOL/L — SIGNIFICANT CHANGE UP (ref 22–31)
CO2 SERPL-SCNC: 27 MMOL/L — SIGNIFICANT CHANGE UP (ref 22–31)
CO2 SERPL-SCNC: 28 MMOL/L — SIGNIFICANT CHANGE UP (ref 22–31)
CREAT SERPL-MCNC: 0.79 MG/DL — SIGNIFICANT CHANGE UP (ref 0.5–1.3)
CREAT SERPL-MCNC: 0.79 MG/DL — SIGNIFICANT CHANGE UP (ref 0.5–1.3)
CREAT SERPL-MCNC: 0.8 MG/DL — SIGNIFICANT CHANGE UP (ref 0.5–1.3)
CREAT SERPL-MCNC: 0.81 MG/DL — SIGNIFICANT CHANGE UP (ref 0.5–1.3)
EGFR: 116 ML/MIN/1.73M2 — SIGNIFICANT CHANGE UP
EGFR: 116 ML/MIN/1.73M2 — SIGNIFICANT CHANGE UP
EGFR: 117 ML/MIN/1.73M2 — SIGNIFICANT CHANGE UP
EOSINOPHIL # BLD AUTO: 0 K/UL — SIGNIFICANT CHANGE UP (ref 0–0.5)
EOSINOPHIL NFR BLD AUTO: 0 % — SIGNIFICANT CHANGE UP (ref 0–6)
FIBRINOGEN PPP-MCNC: 210 MG/DL — SIGNIFICANT CHANGE UP (ref 200–465)
GALACTOMANNAN AG SERPL-ACNC: 0.04 INDEX — SIGNIFICANT CHANGE UP (ref 0–0.49)
GLUCOSE BLDC GLUCOMTR-MCNC: 171 MG/DL — HIGH (ref 70–99)
GLUCOSE BLDC GLUCOMTR-MCNC: 173 MG/DL — HIGH (ref 70–99)
GLUCOSE BLDC GLUCOMTR-MCNC: 185 MG/DL — HIGH (ref 70–99)
GLUCOSE BLDC GLUCOMTR-MCNC: 215 MG/DL — HIGH (ref 70–99)
GLUCOSE SERPL-MCNC: 193 MG/DL — HIGH (ref 70–99)
GLUCOSE SERPL-MCNC: 197 MG/DL — HIGH (ref 70–99)
GLUCOSE SERPL-MCNC: 205 MG/DL — HIGH (ref 70–99)
GLUCOSE SERPL-MCNC: 207 MG/DL — HIGH (ref 70–99)
GRAM STN FLD: SIGNIFICANT CHANGE UP
HADV DNA SPEC QL NAA+PROBE: NEGATIVE — SIGNIFICANT CHANGE UP
HAPTOGLOB SERPL-MCNC: <20 MG/DL — LOW (ref 34–200)
HAPTOGLOB SERPL-MCNC: <20 MG/DL — LOW (ref 34–200)
HCT VFR BLD CALC: 42.6 % — SIGNIFICANT CHANGE UP (ref 39–50)
HCT VFR BLD CALC: 42.8 % — SIGNIFICANT CHANGE UP (ref 39–50)
HCT VFR BLD CALC: 42.9 % — SIGNIFICANT CHANGE UP (ref 39–50)
HCT VFR BLD CALC: 43.7 % — SIGNIFICANT CHANGE UP (ref 39–50)
HGB BLD-MCNC: 12 G/DL — LOW (ref 13–17)
HGB BLD-MCNC: 12.2 G/DL — LOW (ref 13–17)
HGB BLD-MCNC: 12.4 G/DL — LOW (ref 13–17)
HGB BLD-MCNC: 12.5 G/DL — LOW (ref 13–17)
IANC: 16.97 K/UL — HIGH (ref 1.8–7.4)
IANC: 17.98 K/UL — HIGH (ref 1.8–7.4)
IANC: 18.06 K/UL — HIGH (ref 1.8–7.4)
IANC: 18.44 K/UL — HIGH (ref 1.8–7.4)
IMM GRANULOCYTES NFR BLD AUTO: 0.7 % — SIGNIFICANT CHANGE UP (ref 0–0.9)
IMM GRANULOCYTES NFR BLD AUTO: 0.8 % — SIGNIFICANT CHANGE UP (ref 0–0.9)
IMM GRANULOCYTES NFR BLD AUTO: 0.8 % — SIGNIFICANT CHANGE UP (ref 0–0.9)
IMM GRANULOCYTES NFR BLD AUTO: 0.9 % — SIGNIFICANT CHANGE UP (ref 0–0.9)
INR BLD: 2.56 RATIO — HIGH (ref 0.85–1.16)
INR BLD: 2.64 RATIO — HIGH (ref 0.85–1.16)
INR BLD: 2.66 RATIO — HIGH (ref 0.85–1.16)
INR BLD: 2.8 RATIO — HIGH (ref 0.85–1.16)
L PNEUMO DNA SPEC QL NAA+PROBE: SIGNIFICANT CHANGE UP
LDH SERPL L TO P-CCNC: 596 U/L — HIGH (ref 135–225)
LDH SERPL L TO P-CCNC: 622 U/L — HIGH (ref 135–225)
LEGIONELLA DNA SPEC NAA+PROBE: SIGNIFICANT CHANGE UP
LYMPHOCYTES # BLD AUTO: 0.45 K/UL — LOW (ref 1–3.3)
LYMPHOCYTES # BLD AUTO: 0.48 K/UL — LOW (ref 1–3.3)
LYMPHOCYTES # BLD AUTO: 0.5 K/UL — LOW (ref 1–3.3)
LYMPHOCYTES # BLD AUTO: 0.53 K/UL — LOW (ref 1–3.3)
LYMPHOCYTES # BLD AUTO: 2.3 % — LOW (ref 13–44)
LYMPHOCYTES # BLD AUTO: 2.4 % — LOW (ref 13–44)
LYMPHOCYTES # BLD AUTO: 2.5 % — LOW (ref 13–44)
LYMPHOCYTES # BLD AUTO: 2.8 % — LOW (ref 13–44)
M PNEUMO DNA SPEC QL NAA+PROBE: NEGATIVE — SIGNIFICANT CHANGE UP
MAGNESIUM SERPL-MCNC: 1.9 MG/DL — SIGNIFICANT CHANGE UP (ref 1.6–2.6)
MAGNESIUM SERPL-MCNC: 2 MG/DL — SIGNIFICANT CHANGE UP (ref 1.6–2.6)
MCHC RBC-ENTMCNC: 22.3 PG — LOW (ref 27–34)
MCHC RBC-ENTMCNC: 22.7 PG — LOW (ref 27–34)
MCHC RBC-ENTMCNC: 22.8 PG — LOW (ref 27–34)
MCHC RBC-ENTMCNC: 23 PG — LOW (ref 27–34)
MCHC RBC-ENTMCNC: 28 G/DL — LOW (ref 32–36)
MCHC RBC-ENTMCNC: 28.6 G/DL — LOW (ref 32–36)
MCHC RBC-ENTMCNC: 28.6 G/DL — LOW (ref 32–36)
MCHC RBC-ENTMCNC: 29 G/DL — LOW (ref 32–36)
MCV RBC AUTO: 79.3 FL — LOW (ref 80–100)
MCV RBC AUTO: 79.4 FL — LOW (ref 80–100)
MCV RBC AUTO: 79.7 FL — LOW (ref 80–100)
MCV RBC AUTO: 79.8 FL — LOW (ref 80–100)
MONOCYTES # BLD AUTO: 0.94 K/UL — HIGH (ref 0–0.9)
MONOCYTES # BLD AUTO: 1.09 K/UL — HIGH (ref 0–0.9)
MONOCYTES # BLD AUTO: 1.1 K/UL — HIGH (ref 0–0.9)
MONOCYTES # BLD AUTO: 1.24 K/UL — HIGH (ref 0–0.9)
MONOCYTES NFR BLD AUTO: 4.7 % — SIGNIFICANT CHANGE UP (ref 2–14)
MONOCYTES NFR BLD AUTO: 5.5 % — SIGNIFICANT CHANGE UP (ref 2–14)
MONOCYTES NFR BLD AUTO: 5.6 % — SIGNIFICANT CHANGE UP (ref 2–14)
MONOCYTES NFR BLD AUTO: 6.6 % — SIGNIFICANT CHANGE UP (ref 2–14)
NEUTROPHILS # BLD AUTO: 16.97 K/UL — HIGH (ref 1.8–7.4)
NEUTROPHILS # BLD AUTO: 17.98 K/UL — HIGH (ref 1.8–7.4)
NEUTROPHILS # BLD AUTO: 18.06 K/UL — HIGH (ref 1.8–7.4)
NEUTROPHILS # BLD AUTO: 18.44 K/UL — HIGH (ref 1.8–7.4)
NEUTROPHILS NFR BLD AUTO: 89.8 % — HIGH (ref 43–77)
NEUTROPHILS NFR BLD AUTO: 91 % — HIGH (ref 43–77)
NEUTROPHILS NFR BLD AUTO: 91.2 % — HIGH (ref 43–77)
NEUTROPHILS NFR BLD AUTO: 92 % — HIGH (ref 43–77)
NIGHT BLUE STAIN TISS: SIGNIFICANT CHANGE UP
NRBC # BLD AUTO: 0 K/UL — SIGNIFICANT CHANGE UP (ref 0–0)
NRBC # BLD AUTO: 0 K/UL — SIGNIFICANT CHANGE UP (ref 0–0)
NRBC # BLD AUTO: 0.03 K/UL — HIGH (ref 0–0)
NRBC # BLD AUTO: 0.04 K/UL — HIGH (ref 0–0)
NRBC # FLD: 0 K/UL — SIGNIFICANT CHANGE UP (ref 0–0)
NRBC # FLD: 0 K/UL — SIGNIFICANT CHANGE UP (ref 0–0)
NRBC # FLD: 0.03 K/UL — HIGH (ref 0–0)
NRBC # FLD: 0.04 K/UL — HIGH (ref 0–0)
NRBC BLD AUTO-RTO: 0 /100 WBCS — SIGNIFICANT CHANGE UP (ref 0–0)
P JIROVECII DNA L RESP QL NAA+NON-PROBE: NEGATIVE — SIGNIFICANT CHANGE UP
PHOSPHATE SERPL-MCNC: 3.8 MG/DL — SIGNIFICANT CHANGE UP (ref 2.5–4.5)
PHOSPHATE SERPL-MCNC: 4 MG/DL — SIGNIFICANT CHANGE UP (ref 2.5–4.5)
PLATELET # BLD AUTO: 116 K/UL — LOW (ref 150–400)
PLATELET # BLD AUTO: 118 K/UL — LOW (ref 150–400)
PLATELET # BLD AUTO: 120 K/UL — LOW (ref 150–400)
PLATELET # BLD AUTO: 123 K/UL — LOW (ref 150–400)
POTASSIUM SERPL-MCNC: 4.1 MMOL/L — SIGNIFICANT CHANGE UP (ref 3.5–5.3)
POTASSIUM SERPL-MCNC: 4.2 MMOL/L — SIGNIFICANT CHANGE UP (ref 3.5–5.3)
POTASSIUM SERPL-MCNC: 4.3 MMOL/L — SIGNIFICANT CHANGE UP (ref 3.5–5.3)
POTASSIUM SERPL-MCNC: 4.3 MMOL/L — SIGNIFICANT CHANGE UP (ref 3.5–5.3)
POTASSIUM SERPL-SCNC: 4.1 MMOL/L — SIGNIFICANT CHANGE UP (ref 3.5–5.3)
POTASSIUM SERPL-SCNC: 4.2 MMOL/L — SIGNIFICANT CHANGE UP (ref 3.5–5.3)
POTASSIUM SERPL-SCNC: 4.3 MMOL/L — SIGNIFICANT CHANGE UP (ref 3.5–5.3)
POTASSIUM SERPL-SCNC: 4.3 MMOL/L — SIGNIFICANT CHANGE UP (ref 3.5–5.3)
PROT SERPL-MCNC: 6 G/DL — SIGNIFICANT CHANGE UP (ref 6–8.3)
PROT SERPL-MCNC: 6.1 G/DL — SIGNIFICANT CHANGE UP (ref 6–8.3)
PROT SERPL-MCNC: 6.4 G/DL — SIGNIFICANT CHANGE UP (ref 6–8.3)
PROT SERPL-MCNC: 6.5 G/DL — SIGNIFICANT CHANGE UP (ref 6–8.3)
PROTHROM AB SERPL-ACNC: 29.4 SEC — HIGH (ref 9.9–13.4)
PROTHROM AB SERPL-ACNC: 30.3 SEC — HIGH (ref 9.9–13.4)
PROTHROM AB SERPL-ACNC: 30.6 SEC — HIGH (ref 9.9–13.4)
PROTHROM AB SERPL-ACNC: 32.1 SEC — HIGH (ref 9.9–13.4)
RBC # BLD: 5.34 M/UL — SIGNIFICANT CHANGE UP (ref 4.2–5.8)
RBC # BLD: 5.38 M/UL — SIGNIFICANT CHANGE UP (ref 4.2–5.8)
RBC # BLD: 5.39 M/UL — SIGNIFICANT CHANGE UP (ref 4.2–5.8)
RBC # BLD: 5.51 M/UL — SIGNIFICANT CHANGE UP (ref 4.2–5.8)
RBC # FLD: 19.3 % — HIGH (ref 10.3–14.5)
RBC # FLD: 19.8 % — HIGH (ref 10.3–14.5)
RBC # FLD: 19.8 % — HIGH (ref 10.3–14.5)
RBC # FLD: 19.9 % — HIGH (ref 10.3–14.5)
SODIUM SERPL-SCNC: 139 MMOL/L — SIGNIFICANT CHANGE UP (ref 135–145)
SODIUM SERPL-SCNC: 140 MMOL/L — SIGNIFICANT CHANGE UP (ref 135–145)
SODIUM SERPL-SCNC: 141 MMOL/L — SIGNIFICANT CHANGE UP (ref 135–145)
SODIUM SERPL-SCNC: 142 MMOL/L — SIGNIFICANT CHANGE UP (ref 135–145)
SPECIMEN SOURCE: SIGNIFICANT CHANGE UP
TRIGL SERPL-MCNC: 176 MG/DL — HIGH
WBC # BLD: 18.89 K/UL — HIGH (ref 3.8–10.5)
WBC # BLD: 19.77 K/UL — HIGH (ref 3.8–10.5)
WBC # BLD: 19.79 K/UL — HIGH (ref 3.8–10.5)
WBC # BLD: 20.06 K/UL — HIGH (ref 3.8–10.5)
WBC # FLD AUTO: 18.89 K/UL — HIGH (ref 3.8–10.5)
WBC # FLD AUTO: 19.77 K/UL — HIGH (ref 3.8–10.5)
WBC # FLD AUTO: 19.79 K/UL — HIGH (ref 3.8–10.5)
WBC # FLD AUTO: 20.06 K/UL — HIGH (ref 3.8–10.5)

## 2025-03-01 PROCEDURE — 33948 ECMO/ECLS DAILY MGMT-VENOUS: CPT | Mod: GC

## 2025-03-01 PROCEDURE — 99291 CRITICAL CARE FIRST HOUR: CPT | Mod: GC,25

## 2025-03-01 PROCEDURE — 31624 DX BRONCHOSCOPE/LAVAGE: CPT | Mod: GC

## 2025-03-01 PROCEDURE — 31646 BRNCHSC W/THER ASPIR SBSQ: CPT | Mod: GC

## 2025-03-01 PROCEDURE — 99292 CRITICAL CARE ADDL 30 MIN: CPT | Mod: GC,25

## 2025-03-01 PROCEDURE — 93312 ECHO TRANSESOPHAGEAL: CPT | Mod: 26,GC

## 2025-03-01 PROCEDURE — 76604 US EXAM CHEST: CPT | Mod: 26,GC

## 2025-03-01 RX ORDER — MIDAZOLAM IN 0.9 % SOD.CHLORID 1 MG/ML
4 PLASTIC BAG, INJECTION (ML) INTRAVENOUS ONCE
Refills: 0 | Status: DISCONTINUED | OUTPATIENT
Start: 2025-03-01 | End: 2025-03-01

## 2025-03-01 RX ORDER — NICARDIPINE HCL 30 MG
10 CAPSULE ORAL
Qty: 40 | Refills: 0 | Status: DISCONTINUED | OUTPATIENT
Start: 2025-03-01 | End: 2025-03-01

## 2025-03-01 RX ORDER — QUETIAPINE FUMARATE 25 MG/1
25 TABLET ORAL EVERY 12 HOURS
Refills: 0 | Status: DISCONTINUED | OUTPATIENT
Start: 2025-03-01 | End: 2025-03-03

## 2025-03-01 RX ORDER — ARGATROBAN 100 MG/ML
1.09 INJECTION, SOLUTION INTRAVENOUS
Qty: 250 | Refills: 0 | Status: DISCONTINUED | OUTPATIENT
Start: 2025-03-01 | End: 2025-03-04

## 2025-03-01 RX ORDER — NICARDIPINE HCL 30 MG
10 CAPSULE ORAL
Qty: 40 | Refills: 0 | Status: DISCONTINUED | OUTPATIENT
Start: 2025-03-01 | End: 2025-03-02

## 2025-03-01 RX ORDER — INSULIN LISPRO 100 U/ML
INJECTION, SOLUTION INTRAVENOUS; SUBCUTANEOUS EVERY 6 HOURS
Refills: 0 | Status: DISCONTINUED | OUTPATIENT
Start: 2025-03-01 | End: 2025-03-09

## 2025-03-01 RX ORDER — FENTANYL CITRATE-0.9 % NACL/PF 100MCG/2ML
100 SYRINGE (ML) INTRAVENOUS ONCE
Refills: 0 | Status: DISCONTINUED | OUTPATIENT
Start: 2025-03-01 | End: 2025-03-01

## 2025-03-01 RX ORDER — FUROSEMIDE 10 MG/ML
40 INJECTION INTRAMUSCULAR; INTRAVENOUS EVERY 8 HOURS
Refills: 0 | Status: DISCONTINUED | OUTPATIENT
Start: 2025-03-01 | End: 2025-03-02

## 2025-03-01 RX ORDER — METHYLNALTREXONE BROMIDE 12 MG/.6ML
12 INJECTION, SOLUTION SUBCUTANEOUS ONCE
Refills: 0 | Status: COMPLETED | OUTPATIENT
Start: 2025-03-01 | End: 2025-03-01

## 2025-03-01 RX ORDER — CISATRACURIUM BESYLATE 10 MG/5ML
20 INJECTION, SOLUTION INTRAVENOUS ONCE
Refills: 0 | Status: COMPLETED | OUTPATIENT
Start: 2025-03-01 | End: 2025-03-01

## 2025-03-01 RX ADMIN — QUETIAPINE FUMARATE 25 MILLIGRAM(S): 25 TABLET ORAL at 17:15

## 2025-03-01 RX ADMIN — INSULIN LISPRO 2: 100 INJECTION, SOLUTION INTRAVENOUS; SUBCUTANEOUS at 17:50

## 2025-03-01 RX ADMIN — IPRATROPIUM BROMIDE AND ALBUTEROL SULFATE 3 MILLILITER(S): .5; 2.5 SOLUTION RESPIRATORY (INHALATION) at 22:24

## 2025-03-01 RX ADMIN — Medication 4 MILLILITER(S): at 22:24

## 2025-03-01 RX ADMIN — Medication 25 GRAM(S): at 14:15

## 2025-03-01 RX ADMIN — Medication 15 MILLILITER(S): at 17:14

## 2025-03-01 RX ADMIN — Medication 1 APPLICATION(S): at 05:06

## 2025-03-01 RX ADMIN — Medication 1 APPLICATION(S): at 05:09

## 2025-03-01 RX ADMIN — INSULIN LISPRO 1: 100 INJECTION, SOLUTION INTRAVENOUS; SUBCUTANEOUS at 05:20

## 2025-03-01 RX ADMIN — CLOTRIMAZOLE 1 APPLICATION(S): 1 CREAM TOPICAL at 17:14

## 2025-03-01 RX ADMIN — Medication 1 MG/HR: at 08:56

## 2025-03-01 RX ADMIN — Medication 15 MILLILITER(S): at 05:07

## 2025-03-01 RX ADMIN — DEXMEDETOMIDINE HYDROCHLORIDE IN SODIUM CHLORIDE 60.2 MICROGRAM(S)/KG/HR: 4 INJECTION INTRAVENOUS at 08:26

## 2025-03-01 RX ADMIN — Medication 4 MILLIGRAM(S): at 10:29

## 2025-03-01 RX ADMIN — DEXMEDETOMIDINE HYDROCHLORIDE IN SODIUM CHLORIDE 60.2 MICROGRAM(S)/KG/HR: 4 INJECTION INTRAVENOUS at 23:49

## 2025-03-01 RX ADMIN — Medication 1 MG/HR: at 18:21

## 2025-03-01 RX ADMIN — Medication 25 GRAM(S): at 21:37

## 2025-03-01 RX ADMIN — Medication 50 MG/HR: at 18:19

## 2025-03-01 RX ADMIN — IPRATROPIUM BROMIDE AND ALBUTEROL SULFATE 3 MILLILITER(S): .5; 2.5 SOLUTION RESPIRATORY (INHALATION) at 08:00

## 2025-03-01 RX ADMIN — Medication 15 MILLILITER(S): at 11:16

## 2025-03-01 RX ADMIN — IPRATROPIUM BROMIDE AND ALBUTEROL SULFATE 3 MILLILITER(S): .5; 2.5 SOLUTION RESPIRATORY (INHALATION) at 15:35

## 2025-03-01 RX ADMIN — Medication 100 MICROGRAM(S): at 11:33

## 2025-03-01 RX ADMIN — Medication 4 MILLILITER(S): at 03:11

## 2025-03-01 RX ADMIN — CISATRACURIUM BESYLATE 20 MILLIGRAM(S): 10 INJECTION, SOLUTION INTRAVENOUS at 11:33

## 2025-03-01 RX ADMIN — Medication 40 MILLIGRAM(S): at 11:16

## 2025-03-01 RX ADMIN — Medication 4 MILLILITER(S): at 15:35

## 2025-03-01 RX ADMIN — POLYETHYLENE GLYCOL 3350 17 GRAM(S): 17 POWDER, FOR SOLUTION ORAL at 17:15

## 2025-03-01 RX ADMIN — FUROSEMIDE 40 MILLIGRAM(S): 10 INJECTION INTRAMUSCULAR; INTRAVENOUS at 16:44

## 2025-03-01 RX ADMIN — QUETIAPINE FUMARATE 25 MILLIGRAM(S): 25 TABLET ORAL at 11:04

## 2025-03-01 RX ADMIN — Medication 50 MG/HR: at 08:27

## 2025-03-01 RX ADMIN — MUPIROCIN CALCIUM 1 APPLICATION(S): 20 CREAM TOPICAL at 17:14

## 2025-03-01 RX ADMIN — PROPOFOL 36.1 MICROGRAM(S)/KG/MIN: 10 INJECTION, EMULSION INTRAVENOUS at 05:05

## 2025-03-01 RX ADMIN — PROPOFOL 36.1 MICROGRAM(S)/KG/MIN: 10 INJECTION, EMULSION INTRAVENOUS at 06:13

## 2025-03-01 RX ADMIN — PROPOFOL 36.1 MICROGRAM(S)/KG/MIN: 10 INJECTION, EMULSION INTRAVENOUS at 08:26

## 2025-03-01 RX ADMIN — Medication 10 MILLILITER(S): at 17:15

## 2025-03-01 RX ADMIN — INSULIN LISPRO 1: 100 INJECTION, SOLUTION INTRAVENOUS; SUBCUTANEOUS at 13:00

## 2025-03-01 RX ADMIN — FUROSEMIDE 40 MILLIGRAM(S): 10 INJECTION INTRAMUSCULAR; INTRAVENOUS at 09:50

## 2025-03-01 RX ADMIN — DEXAMETHASONE 110 MILLIGRAM(S): 0.5 TABLET ORAL at 06:13

## 2025-03-01 RX ADMIN — Medication 1 APPLICATION(S): at 17:19

## 2025-03-01 RX ADMIN — Medication 10 MILLILITER(S): at 05:09

## 2025-03-01 RX ADMIN — PROPOFOL 36.1 MICROGRAM(S)/KG/MIN: 10 INJECTION, EMULSION INTRAVENOUS at 18:20

## 2025-03-01 RX ADMIN — DEXMEDETOMIDINE HYDROCHLORIDE IN SODIUM CHLORIDE 60.2 MICROGRAM(S)/KG/HR: 4 INJECTION INTRAVENOUS at 18:19

## 2025-03-01 RX ADMIN — Medication 4 MILLIGRAM(S): at 11:33

## 2025-03-01 RX ADMIN — Medication 4 MILLILITER(S): at 08:00

## 2025-03-01 RX ADMIN — CLOTRIMAZOLE 1 APPLICATION(S): 1 CREAM TOPICAL at 05:08

## 2025-03-01 RX ADMIN — PROPOFOL 36.1 MICROGRAM(S)/KG/MIN: 10 INJECTION, EMULSION INTRAVENOUS at 23:50

## 2025-03-01 RX ADMIN — Medication 166.67 MILLIGRAM(S): at 05:05

## 2025-03-01 RX ADMIN — Medication 4 MILLIGRAM(S): at 11:59

## 2025-03-01 RX ADMIN — POLYETHYLENE GLYCOL 3350 17 GRAM(S): 17 POWDER, FOR SOLUTION ORAL at 05:07

## 2025-03-01 RX ADMIN — MUPIROCIN CALCIUM 1 APPLICATION(S): 20 CREAM TOPICAL at 05:09

## 2025-03-01 RX ADMIN — Medication 25 GRAM(S): at 05:06

## 2025-03-01 RX ADMIN — METHYLNALTREXONE BROMIDE 12 MILLIGRAM(S): 12 INJECTION, SOLUTION SUBCUTANEOUS at 10:29

## 2025-03-01 RX ADMIN — INSULIN LISPRO 2: 100 INJECTION, SOLUTION INTRAVENOUS; SUBCUTANEOUS at 23:51

## 2025-03-01 RX ADMIN — IPRATROPIUM BROMIDE AND ALBUTEROL SULFATE 3 MILLILITER(S): .5; 2.5 SOLUTION RESPIRATORY (INHALATION) at 03:11

## 2025-03-01 NOTE — PROGRESS NOTE ADULT - ASSESSMENT
38 yo M w/ class III obesity, pAfib (no AC), HTN, BEA (on CPAP), history of b/l papilledema attributed to pseudotumor cerebri, who is transferred from [place] on 2/26 for VV ECMO 2/2 ARDS. Patient initially presented to [place] on 2/24 for SOB and b/l LE edema. As per chart review, patient endorses some degree of SOB and b/l LE edema x 3 months with significant weight gain. As per ICU team, patient had positive sick contacts with viral URI 1 to 2 weeks PTA and since then has had worsening SOB. Patient found to be reportedly hypoxemic to 70% on RA with worsening respiratory status/secretions and somnolence in the ED. Patient was a difficult intubation (7 attempts). Despite optimal vent management, patient remained hypoxemic. Unable to prone due to obesity. Patient cannulated for VV ECMO on 2/25 and transferred to [facility] for further management.     Neuro  #Mental status   #pseudotumor cerebri  - history of pseudotumor cerebri s/p LP under fluoro in 2020 with high opening pressure with MRI 2020 also showed possible pituitary mass but unclear because of pressure effect from pseudotumor cerebri causing partially empty sella. Considering to start acetazolamide but never started  - prolactin high (30), defer checking cortisol axis given already received steroids but should be investigated, ACTH (<1.5) low but should be suppressed iso steroids  - concern for pituitary adenoma, however TSH .79 normal,  fT4 0.9 normal,   and low T3 67  - sedated with Dilaudid,  propofol, and precedex gtt. Goal to wean off propofol today  - paralytics d/c'd 2/27   - PT/OT following     Cardiovascular  #HTN   #Atrial fibrillation   - Hx of a. fib not on AC   - echo from 2024 did not demonstrate elevation of pulmonary pressures, but now with severe PH suggesting chronicity on TTE at    - [facility] 2/26 showing VTI 17  - TTE on 2/25 showed LV EF 61%, enlarged RV with TAPSE 2.1cm, ePASP at least 49 (Patient had 10/2024 TTE with normal LV and RV with ePASP 14).  - troponins initially elevated, peaked at 162 however downtrended   - s/p esmolol drip for high flows now D/C'd may need to consider again if need for higher flows  - cardene gtt started for hypertension [id number], will continue for now     Pulmonary  #Pulmonary HTN  #ARDS  #Multifocal Pneumonia   #BEA   - history of diagnosed BEA/?OHS intermittent compliance to home PAP  - c/f acute on suspected chronic pulmonary hypertension in setting of possible BEA/OHS c/b pneumonia, ARDS, and pulmonary edema   - CTA chest on 2/24 negative for pulmonary embolism, notable for patchy bilateral lower lobe opacities, hepatomegaly, mild ascites, edema/induration of the abdominal pannus.  - Full RVP and BAL cultures negative    - Bronchoscopy demonstrated copious purulent secretions 2/26 and 2/27   - Dexamethasone 20mg x 5 days, then 10 mg and taper as per clinical status pneumonia/ARDS  - DuoNeb q6 hours + 3% saline will start IPV   - c/w PC/AC PIP:30  RR 14 PC: 12 PEEP:18 Fio2 50% ~250-300  - difficult glottic visualization due to large tongue upon intubation at [facility] 2/25    #ECMO  VV ECMO		  Cannulation sites/dates:  Flow: 5.1		P1: 219		Delta P: 44  RPM: 3700		P2: 176		SVO2: 77  Sweep:3.5L/min:		FIO2: 1      ECMO Calculated CO: 7.28  DO2/VO2: 5.41  VO2/DO2: 0.18    -requiring high flows for oxygenation causing high delta P but D/V adequate, no lactate and SVO2 WNL but will watch closely   -Clinically perfusing. Lactate: 1.8  -ECMO sweep sighing q1hr   - Adjust circuit flow as tolerated with DO2:VO2 goal >2  -Monitor for hemolysis, chatter, evidence of recirculation, mixing      Gastrointestinal  #Diet   #Transaminitis  - tolerating tube feeds  - c/w bowel regimen   - elevated Tbili likely iso hemolysis 2/2 high ECMO flows, mild transaminitis now improving  - hepatomegaly and mild ascites noted on CT A/P - no pocket to tap  - RUQ ultrasound with steatosis    - c/w bowel regimen with senna, Miralax, will hold naloxegol and give relistor x1 today  - triglycerides 192   - nutrition following       Gu/Renal  #ELLA  - ELLA - improving, likely ATN/vascular congestion  - good UO, overall net negative -3L/LOS  - c/w intermittent diuresis, given lasix 40mg IVP today with goal net negative -2 LIters/24 hrs    Infectious disease  #leukocytosis  #Pneumonia   #cellulitis   - s/p cefepime, linezolid and aztihro 2/25-2/26  - continue vanco (2/26-   ) for an additional day, and c/w zosyn (2/26-  ) empirically, should cover cellulitis as well  - full RVP negative  - BCx x 2 2/24 currently NGTD, UCx 2/25 NGTD, tracheal aspirate Cx from 2/25 with normal commensal kyle   - strep pneumo Ag and urine legionella negative   - Mupiricon b/l nares for +MSSA x 5 days (2/26-  )  - BAL cultures 2/26 currently NGTD   - ? penile meatus yellow discharge pending results of GC/chamydia, HIV negative   - candidal intertrigo +/- cellulitis of the pannus - clotrimazole cream 1% BID 2/26 -+ abx as per above    Hematology/DVT ppx  #Anticoagulation   #Anemia   - c/w argatroban with goal 50-90  - mild anemia Hb 11s on admission down from 13 - check iron studies, retic, LDH, hapto  - s/p 1 u PRBC 2/26 for O2 delivery  - LE duplex 2/25 neg    Endocrine  #DM  -A1c 6.7   - obesity with metabolic syndrome  - diabetes with steroid induced hypoglycemia  - c/w decadron and taper iso ARDS  - pituitary workup, steroids as above given recent weight gain but likely volume though  - c/w ISS and FS Q6h and adjust as needed     SKin/Lines  #Access  #Cellulitis   #DTI  - L subclavian TLC inserted 2/25   - L radial artery line 2/25  - RIJ ECMO cannula 2/26 at 20 cm  - R fem ECMO cannula 2/26 at 25 cm  - candidal intertrigo +/- cellulitis of the pannus - clotrimazole cream 1% BID 2/26 -+ abx as per above  - as per nursing staff, patient with ? DTI to right buttocks   - wound consult placed     GOC   - full code   - palliative involved  and following while on ECMO   - Mother involved in care and life partner      38 yo M w/ class III obesity, pAfib (no AC), HTN, BEA (on CPAP), history of b/l papilledema attributed to pseudotumor cerebri, who is transferred from [place] on 2/26 for VV ECMO 2/2 ARDS. Patient initially presented to [place] on 2/24 for SOB and b/l LE edema. As per chart review, patient endorses some degree of SOB and b/l LE edema x 3 months with significant weight gain. As per ICU team, patient had positive sick contacts with viral URI 1 to 2 weeks PTA and since then has had worsening SOB. Patient found to be reportedly hypoxemic to 70% on RA with worsening respiratory status/secretions and somnolence in the ED. Patient was a difficult intubation (7 attempts). Despite optimal vent management, patient remained hypoxemic. Unable to prone due to obesity. Patient cannulated for VV ECMO on 2/25 and transferred to [facility] for further management.     Neuro  #Mental status   #pseudotumor cerebri  - history of pseudotumor cerebri s/p LP under fluoro in 2020 with high opening pressure with MRI 2020 also showed possible pituitary mass but unclear because of pressure effect from pseudotumor cerebri causing partially empty sella. Considering to start acetazolamide but never started  - prolactin high (30), defer checking cortisol axis given already received steroids but should be investigated, ACTH (<1.5) low but should be suppressed iso steroids  - concern for pituitary adenoma, however TSH .79 normal,  fT4 0.9 normal,   and low T3 67  - sedated with Dilaudid,  propofol, and precedex gtt. Goal to wean off propofol today  - paralytics d/c'd 2/27   - PT/OT following     Cardiovascular  #HTN   #Atrial fibrillation   - Hx of a. fib not on AC   - echo from 2024 did not demonstrate elevation of pulmonary pressures, but now with severe PH suggesting chronicity on TTE at    - [facility] 2/26 showing VTI 17  - TTE on 2/25 showed LV EF 61%, enlarged RV with TAPSE 2.1cm, ePASP at least 49 (Patient had 10/2024 TTE with normal LV and RV with ePASP 14).  - troponins initially elevated, peaked at 162 however downtrended   - s/p esmolol drip for high flows now D/C'd may need to consider again if need for higher flows  - cardene gtt started for hypertension [id number], will continue for now     Pulmonary  #Pulmonary HTN  #ARDS  #Multifocal Pneumonia   #BEA   - history of diagnosed BEA/?OHS intermittent compliance to home PAP  - c/f acute on suspected chronic pulmonary hypertension in setting of possible BEA/OHS c/b pneumonia, ARDS, and pulmonary edema   - CTA chest on 2/24 negative for pulmonary embolism, notable for patchy bilateral lower lobe opacities, hepatomegaly, mild ascites, edema/induration of the abdominal pannus.  - Full RVP and BAL cultures negative    - Bronchoscopy demonstrated copious purulent secretions 2/26 and 2/27   - Dexamethasone 20mg x 5 days, then 10 mg and taper as per clinical status pneumonia/ARDS  - DuoNeb q6 hours + 3% saline will start IPV   - c/w PC/AC PIP:30  RR 14 PC: 12 PEEP:18 Fio2 40% ~300-400  - difficult glottic visualization due to large tongue upon intubation at [facility] 2/25    #ECMO  VV ECMO		  Cannulation sites/dates:  Flow: 5.14		P1: 217		Delta P: 42  RPM: 3700		P2: 173		SVO2: 82  Sweep:3.5L/min:		FIO2: 1      ECMO Calculated CO: 6.46  DO2/VO2: 5.729  VO2/DO2: 0.174    -requiring high flows for oxygenation causing high delta P but D/V adequate, no lactate and SVO2 WNL but will watch closely   -Clinically perfusing. Lactate: 1.8  -ECMO sweep sighing q1hr   - Adjust circuit flow as tolerated with DO2:VO2 goal >2  -Monitor for hemolysis, chatter, evidence of recirculation, mixing      Gastrointestinal  #Diet   #Transaminitis  - tolerating tube feeds  - c/w bowel regimen   - elevated Tbili likely iso hemolysis 2/2 high ECMO flows, mild transaminitis now improving  - hepatomegaly and mild ascites noted on CT A/P - no pocket to tap  - RUQ ultrasound with steatosis    - c/w bowel regimen with senna, Miralax, will hold naloxegol and give relistor x1 today  - triglycerides 192   - nutrition following       Gu/Renal  #ELLA  - ELLA - improving, likely ATN/vascular congestion  - good UO, overall net negative -3L/LOS  - c/w intermittent diuresis, given lasix 40mg IVP today with goal net negative -2 LIters/24 hrs    Infectious disease  #leukocytosis  #Pneumonia   #cellulitis   - s/p cefepime, linezolid and aztihro 2/25-2/26  - continue vanco (2/26-   ) for an additional day, and c/w zosyn (2/26-  ) empirically, should cover cellulitis as well  - full RVP negative  - BCx x 2 2/24 currently NGTD, UCx 2/25 NGTD, tracheal aspirate Cx from 2/25 with normal commensal kyle   - strep pneumo Ag and urine legionella negative   - Mupiricon b/l nares for +MSSA x 5 days (2/26-  )  - BAL cultures 2/26 currently NGTD   - ? penile meatus yellow discharge pending results of GC/chamydia, HIV negative   - candidal intertrigo +/- cellulitis of the pannus - clotrimazole cream 1% BID 2/26 -+ abx as per above    Hematology/DVT ppx  #Anticoagulation   #Anemia   - c/w argatroban with goal 50-90  - mild anemia Hb 11s on admission down from 13 - check iron studies, retic, LDH, hapto  - s/p 1 u PRBC 2/26 for O2 delivery  - LE duplex 2/25 neg    Endocrine  #DM  -A1c 6.7   - obesity with metabolic syndrome  - diabetes with steroid induced hypoglycemia  - c/w decadron and taper iso ARDS  - pituitary workup, steroids as above given recent weight gain but likely volume though  - c/w ISS and FS Q6h and adjust as needed     SKin/Lines  #Access  #Cellulitis   #DTI  - L subclavian TLC inserted 2/25   - L radial artery line 2/25  - RIJ ECMO cannula 2/26 at 20 cm  - R fem ECMO cannula 2/26 at 25 cm  - candidal intertrigo +/- cellulitis of the pannus - clotrimazole cream 1% BID 2/26 -+ abx as per above  - as per nursing staff, patient with ? DTI to right buttocks   - wound consult placed     GOC   - full code   - palliative involved  and following while on ECMO   - Mother involved in care and life partner      40 yo M w/ class III obesity, pAfib (no AC), HTN, BEA (on CPAP), history of b/l papilledema attributed to pseudotumor cerebri, who is transferred from [place] on 2/26 for VV ECMO 2/2 ARDS. Patient initially presented to [place] on 2/24 for SOB and b/l LE edema. As per chart review, patient endorses some degree of SOB and b/l LE edema x 3 months with significant weight gain. As per ICU team, patient had positive sick contacts with viral URI 1 to 2 weeks PTA and since then has had worsening SOB. Patient found to be reportedly hypoxemic to 70% on RA with worsening respiratory status/secretions and somnolence in the ED. Patient was a difficult intubation (7 attempts). Despite optimal vent management, patient remained hypoxemic. Unable to prone due to obesity. Patient cannulated for VV ECMO on 2/25 and transferred to [facility] for further management.     Neuro  #Mental status   #pseudotumor cerebri  - history of pseudotumor cerebri s/p LP under fluoro in 2020 with high opening pressure with MRI 2020 also showed possible pituitary mass but unclear because of pressure effect from pseudotumor cerebri causing partially empty sella. Considering to start acetazolamide but never started  - prolactin high (30), defer checking cortisol axis given already received steroids but should be investigated, ACTH (<1.5) low but should be suppressed iso steroids  - concern for pituitary adenoma, however TSH .79 normal,  fT4 0.9 normal,   and low T3 67  - sedated with Dilaudid,  propofol, and precedex gtt. Goal to wean off propofol today  - start seroquel 25mg BID to assist with sedation wean, monitor QTc  - paralytics d/c'd 2/27   - PT/OT following     Cardiovascular  #HTN   #Atrial fibrillation   - Hx of a. fib not on AC   - echo from 2024 did not demonstrate elevation of pulmonary pressures, but now with severe PH suggesting chronicity on TTE at    - [facility] 2/26 showing VTI 17  - TTE on 2/25 showed LV EF 61%, enlarged RV with TAPSE 2.1cm, ePASP at least 49 (Patient had 10/2024 TTE with normal LV and RV with ePASP 14).  - troponins initially elevated, peaked at 162 however downtrended   - s/p esmolol drip for high flows now D/C'd may need to consider again if need for higher flows  - cardene gtt started for hypertension [id number], will continue for now     Pulmonary  #Pulmonary HTN  #ARDS  #Multifocal Pneumonia   #BEA   - history of diagnosed BEA/?OHS intermittent compliance to home PAP  - c/f acute on suspected chronic pulmonary hypertension in setting of possible BEA/OHS c/b pneumonia, ARDS, and pulmonary edema   - CTA chest on 2/24 negative for pulmonary embolism, notable for patchy bilateral lower lobe opacities, hepatomegaly, mild ascites, edema/induration of the abdominal pannus.  - Full RVP and BAL cultures negative    - Bronchoscopy demonstrated copious purulent secretions 2/26 and 2/27   - Dexamethasone 20mg x 5 days, then 10 mg and taper as per clinical status pneumonia/ARDS  - DuoNeb q6 hours + 3% saline will start IPV   - c/w PC/AC PIP:30  RR 14 PC: 12 PEEP:18 Fio2 40% ~300-400  - difficult glottic visualization due to large tongue upon intubation at [facility] 2/25    #ECMO  VV ECMO		  Cannulation sites/dates:  Flow: 5.14		P1: 217		Delta P: 42  RPM: 3700		P2: 173		SVO2: 82  Sweep:3.5L/min:		FIO2: 1      ECMO Calculated CO: 6.46  DO2/VO2: 5.729  VO2/DO2: 0.174    -requiring high flows for oxygenation causing high delta P but D/V adequate, no lactate and SVO2 WNL but will watch closely   -Clinically perfusing. Lactate: 1.8  -ECMO sweep sighing q1hr   - Adjust circuit flow as tolerated with DO2:VO2 goal >2  -Monitor for hemolysis, chatter, evidence of recirculation, mixing      Gastrointestinal  #Diet   #Transaminitis  - tolerating tube feeds  - c/w bowel regimen   - elevated Tbili likely iso hemolysis 2/2 high ECMO flows, mild transaminitis now improving  - hepatomegaly and mild ascites noted on CT A/P - no pocket to tap  - RUQ ultrasound with steatosis    - c/w bowel regimen with senna, Miralax, will hold naloxegol and give relistor x1 today  - triglycerides 192   - nutrition following       Gu/Renal  #ELLA  - ELLA - improving, likely ATN/vascular congestion  - good UO, overall net negative -3.6L/LOS, goal net negative -900ml/24 hrs  - s/p intermittent diuresis, start standing lasix 40mg TID     Infectious disease  #leukocytosis  #Pneumonia   #cellulitis   - s/p cefepime, linezolid and aztihro 2/25-2/26  - continue vanco (2/26-   ) for an additional day, and c/w zosyn (2/26-  ) empirically, should cover cellulitis as well  - full RVP negative  - BCx x 2 2/24 currently NGTD, UCx 2/25 NGTD, tracheal aspirate Cx from 2/25 with normal commensal kyle   - strep pneumo Ag and urine legionella negative   - Mupiricon b/l nares for +MSSA x 5 days (2/26-  )  - BAL cultures 2/26 currently NGTD   - ? penile meatus yellow discharge pending results of GC/chamydia, HIV negative   - candidal intertrigo +/- cellulitis of the pannus - clotrimazole cream 1% BID 2/26 -+ abx as per above    Hematology/DVT ppx  #Anticoagulation   #Anemia   - c/w argatroban with goal 50-90  - mild anemia Hb 11s on admission down from 13 - check iron studies, retic, LDH, hapto  - s/p 1 u PRBC 2/26 for O2 delivery  - LE duplex 2/25 neg    Endocrine  #DM  -A1c 6.7   - obesity with metabolic syndrome  - diabetes with steroid induced hypoglycemia  - c/w decadron and taper iso ARDS  - pituitary workup, steroids as above given recent weight gain but likely volume though  - c/w ISS and FS Q6h and adjust as needed     SKin/Lines  #Access  #Cellulitis   #DTI  - L subclavian TLC inserted 2/25   - L radial artery line 2/25  - RIJ ECMO cannula 2/26 at 20 cm  - R fem ECMO cannula 2/26 at 25 cm  - candidal intertrigo +/- cellulitis of the pannus - clotrimazole cream 1% BID 2/26 -+ abx as per above  - as per nursing staff, patient with ? DTI to right buttocks   - wound consult placed     GOC   - full code   - palliative involved  and following while on ECMO   - Mother involved in care and life partner      40 yo M w/ class III obesity, pAfib (no AC), HTN, BEA (on CPAP), history of b/l papilledema attributed to pseudotumor cerebri, who is transferred from [place] on 2/26 for VV ECMO 2/2 ARDS. Patient initially presented to [place] on 2/24 for SOB and b/l LE edema. As per chart review, patient endorses some degree of SOB and b/l LE edema x 3 months with significant weight gain. As per ICU team, patient had positive sick contacts with viral URI 1 to 2 weeks PTA and since then has had worsening SOB. Patient found to be reportedly hypoxemic to 70% on RA with worsening respiratory status/secretions and somnolence in the ED. Patient was a difficult intubation (7 attempts). Despite optimal vent management, patient remained hypoxemic. Unable to prone due to obesity. Patient cannulated for VV ECMO on 2/25 and transferred to [facility] for further management.     Neuro  #Mental status   #pseudotumor cerebri  - history of pseudotumor cerebri s/p LP under fluoro in 2020 with high opening pressure with MRI 2020 also showed possible pituitary mass but unclear because of pressure effect from pseudotumor cerebri causing partially empty sella. Considering to start acetazolamide but never started  - prolactin high (30), defer checking cortisol axis given already received steroids but should be investigated, ACTH (<1.5) low but should be suppressed iso steroids  - concern for pituitary adenoma, however TSH .79 normal,  fT4 0.9 normal,   and low T3 67  - sedated with Dilaudid,  propofol, and precedex gtt. Goal to wean off propofol today  - start seroquel 25mg BID to assist with sedation wean, monitor QTc  - paralytics d/c'd 2/27   - PT/OT following     Cardiovascular  #HTN   #Atrial fibrillation   - Hx of a. fib not on AC   - echo from 2024 did not demonstrate elevation of pulmonary pressures, but now with severe PH suggesting chronicity on TTE at    - [facility] 2/26 showing VTI 17  - TTE on 2/25 showed LV EF 61%, enlarged RV with TAPSE 2.1cm, ePASP at least 49 (Patient had 10/2024 TTE with normal LV and RV with ePASP 14).  - troponins initially elevated, peaked at 162 however downtrended   - s/p esmolol drip for high flows now D/C'd may need to consider again if need for higher flows  - cardene gtt started for hypertension [id number], will continue for now   - ECG daily to monitor QTc    Pulmonary  #Pulmonary HTN  #ARDS  #Multifocal Pneumonia   #BEA   - history of diagnosed BEA/?OHS intermittent compliance to home PAP  - c/f acute on suspected chronic pulmonary hypertension in setting of possible BEA/OHS c/b pneumonia, ARDS, and pulmonary edema   - CTA chest on 2/24 negative for pulmonary embolism, notable for patchy bilateral lower lobe opacities, hepatomegaly, mild ascites, edema/induration of the abdominal pannus.  - Full RVP and BAL cultures negative    - Bronchoscopy demonstrated copious purulent secretions 2/26 and 2/27  - Bronchoscopy 3/1 noted with small amount of old, dried blood at R and L mainstem, not occluding airway.  Small amount of old, dried blood in BI and LLL. Follow up BAL and cell ct  - Dexamethasone 20mg x 5 days, then 10 mg and taper as per clinical status pneumonia/ARDS  - DuoNeb q6 hours + 3% saline will start IPV   - c/w PC/AC PIP:30  RR 14 PC: 12 PEEP:24 Fio2 40% ~280-320  - difficult glottic visualization due to large tongue upon intubation at [facility] 2/25    #ECMO  VV ECMO		  Cannulation sites/dates:  Flow: 5.14		P1: 217		Delta P: 42  RPM: 3700		P2: 173		SVO2: 82  Sweep: 3.5L/min:		FIO2: 1      ECMO Calculated CO: 6.46  DO2/VO2: 5.729  VO2/DO2: 0.174    - requiring high flows for oxygenation causing high delta P but D/V adequate, no lactate and SVO2 WNL but will watch closely   - Clinically perfusing. Lactate: 1.9  - ECMO sweep sighing q1hr   - Adjust circuit flow as tolerated with DO2:VO2 goal >2  - Monitor for hemolysis, chatter, evidence of recirculation, mixing      Gastrointestinal  #Diet   #Transaminitis  - tolerating tube feeds  - c/w bowel regimen   - elevated Tbili likely iso hemolysis 2/2 high ECMO flows, mild transaminitis now improving  - hepatomegaly and mild ascites noted on CT A/P - no pocket to tap  - RUQ ultrasound with steatosis    - c/w bowel regimen with Senna and Miralax. S/p Relistor x 2 doses, Naloxegol held  - triglycerides 176  - nutrition following       Gu/Renal  #ELLA  - ELLA - improving, likely ATN/vascular congestion  - good UO, overall net negative -3.6L/LOS  - s/p intermittent diuresis, start standing lasix 40mg TID     Infectious disease  #leukocytosis  #Pneumonia   #cellulitis   - Leukocytosis now likely iso steroids, remains afebrile  - s/p cefepime, linezolid, and aztihro 2/25-2/26   - discontinue vanco (2/26-3/1) and c/w zosyn (2/26-  ) empirically, should cover cellulitis as well  - full RVP negative  - BCx x 2 2/24 currently NGTD, UCx 2/25 NGTD, tracheal aspirate Cx from 2/25 with normal commensal kyle   - strep pneumo Ag and urine legionella negative   - Mupiricon b/l nares for +MSSA x 5 days (2/26-  )  - follow up BAL cultures 3/1, previous BAL 2/26 NGTD  - ? penile meatus yellow discharge pending results of GC/chamydia, HIV negative   - candidal intertrigo +/- cellulitis of the pannus - clotrimazole cream 1% BID 2/26 -+ abx as per above    Hematology/DVT ppx  #Anticoagulation   #Thrombocytopenia  #Anemia   - Hb stable 12.4, platelets downtrending likely r/t shearing 2/2 ECMO circuit  - s/p 1 u PRBC 2/26 for O2 delivery  - c/w argatroban with goal 50-90  - LE duplex 2/25 neg    Endocrine  #DM  -A1c 6.7   - obesity with metabolic syndrome  - diabetes with steroid induced hypoglycemia  - c/w decadron and taper iso ARDS  - pituitary workup, steroids as above given recent weight gain but likely volume though  - c/w ISS and FS Q6h and adjust as needed     SKin/Lines  #Access  #Cellulitis   #DTI  - L subclavian TLC inserted 2/25   - L radial artery line 2/25  - RIJ ECMO cannula 2/26 at 20 cm  - R fem ECMO cannula 2/26 at 25 cm  - candidal intertrigo +/- cellulitis of the pannus - clotrimazole cream 1% BID 2/26 -+ abx as per above  - as per nursing staff, patient with ? DTI to right buttocks   - wound consult placed     GOC   - full code   - palliative involved  and following while on ECMO   - Mother involved in care and life partner      40 yo M w/ class III obesity, pAfib (no AC), HTN, BEA (on CPAP), history of b/l papilledema attributed to pseudotumor cerebri, who is transferred from [place] on 2/26 for VV ECMO 2/2 ARDS. Patient initially presented to [facility] on 2/24 for SOB and b/l LE edema. As per chart review, patient endorses some degree of SOB and b/l LE edema x 3 months with significant weight gain. As per ICU team, patient had positive sick contacts with viral URI 1 to 2 weeks PTA and since then has had worsening SOB. Patient found to be reportedly hypoxemic to 70% on RA with worsening respiratory status/secretions and somnolence in the ED. Patient was a difficult intubation (7 attempts). Despite optimal vent management, patient remained hypoxemic. Unable to prone due to obesity. Patient cannulated for VV ECMO on 2/25 and transferred to [facility] for further management.     Neuro  #Mental status   #pseudotumor cerebri  - history of pseudotumor cerebri s/p LP under fluoro in 2020 with high opening pressure with MRI 2020 also showed possible pituitary mass but unclear because of pressure effect from pseudotumor cerebri causing partially empty sella. Considering to start acetazolamide but never started  - prolactin high (30), defer checking cortisol axis given already received steroids but should be investigated, ACTH (<1.5) low but should be suppressed iso steroids  - concern for pituitary adenoma, however TSH .79 normal,  fT4 0.9 normal, and low T3 67  - sedated with Dilaudid,  propofol, and precedex gtt. Goal to wean off propofol today  - start seroquel 25mg BID to assist with sedation wean, monitor QTc  - paralytics d/c'd 2/27   - PT/OT following     Cardiovascular  #HTN   #Atrial fibrillation   - Hx of a. fib not on AC   - echo from 2024 did not demonstrate elevation of pulmonary pressures, but now with severe PH suggesting chronicity on TTE at    - TTE on 2/25 showed LV EF 61%, enlarged RV with TAPSE 2.1cm, ePASP at least 49 (Patient had 10/2024 TTE with normal LV and RV with ePASP 14).  - ROBERT 2/26 showing VTI 17  - ROBERT 3/1 showing mild TR, enlarged RV with normal LV/RV function. PEEP increased to 24 without signs of RV dilation  - troponins initially elevated, peaked at 162 however downtrended   - s/p esmolol drip for high flows now D/C'd may need to consider again if need for higher flows  - cardene gtt started for hypertension [id number], will continue for now   - ECG daily to monitor QTc, 490ms today    Pulmonary  #Pulmonary HTN  #ARDS  #Multifocal Pneumonia   #BEA   - history of diagnosed BEA/?OHS intermittent compliance to home PAP  - c/f acute on suspected chronic pulmonary hypertension in setting of possible BEA/OHS c/b pneumonia, ARDS, and pulmonary edema   - CTA chest on 2/24 negative for pulmonary embolism, notable for patchy bilateral lower lobe opacities, hepatomegaly, mild ascites, edema/induration of the abdominal pannus.  - Full RVP and BAL cultures negative    - Bronchoscopy demonstrated copious purulent secretions 2/26 and 2/27  - Bronchoscopy 3/1 noted with small amount of old, dried blood at R and L mainstem, not occluding airway.  Follow up BAL and cell ct  - Dexamethasone 20mg x 5 days, then 10 mg and taper as per clinical status pneumonia/ARDS  - C/w duoNeb q6 hours + 3% saline and IPV   - c/w PC/AC PIP:30  RR 14 PC: 12 PEEP:24 Fio2 40% ~280-320  - difficult glottic visualization due to large tongue upon intubation at [facility] 2/25    #ECMO  VV ECMO		  Cannulation sites/dates:  Flow: 5.14		P1: 217		Delta P: 42  RPM: 3700		P2: 173		SVO2: 82  Sweep: 3.5L/min:		FIO2: 1      ECMO Calculated CO: 6.46  DO2/VO2: 5.729  VO2/DO2: 0.174    - requiring high flows for oxygenation causing high delta P but D/V adequate, no lactate and SVO2 WNL but will watch closely   - Clinically perfusing. Lactate: 1.9  - ECMO sweep sighing q1hr   - Adjust circuit flow as tolerated with DO2:VO2 goal >2  - Monitor for hemolysis, chatter, evidence of recirculation, mixing      Gastrointestinal  #Diet   #Transaminitis  - tolerating tube feeds  - c/w bowel regimen   - elevated Tbili likely iso hemolysis 2/2 high ECMO flows, mild transaminitis now improving  - hepatomegaly and mild ascites noted on CT A/P - no pocket to tap  - RUQ ultrasound with steatosis    - c/w bowel regimen with Senna and Miralax. S/p Relistor x 2 doses, Naloxegol held  - triglycerides 176  - nutrition following       Gu/Renal  #ELLA  - ELLA - improving, likely ATN/vascular congestion  - good UO, overall net negative -3.6L/LOS  - s/p intermittent diuresis, start standing lasix 40mg TID with goal to keep net negative    Infectious disease  #leukocytosis  #Pneumonia   #cellulitis   - Leukocytosis now likely iso steroids, remains afebrile  - s/p cefepime, linezolid, and aztihro 2/25-2/26   - discontinue vanco (2/26-3/1) and c/w zosyn (2/26-  ) empirically, should cover cellulitis as well  - full RVP negative  - BCx x 2 2/24 currently NGTD, UCx 2/25 NGTD, tracheal aspirate Cx from 2/25 with normal commensal kyle   - strep pneumo Ag and urine legionella negative   - Mupiricon b/l nares for +MSSA x 5 days (2/26-  )  - follow up BAL cultures 3/1, previous BAL 2/26 NGTD  - ? penile meatus yellow discharge pending results of GC/chamydia, HIV negative   - candidal intertrigo +/- cellulitis of the pannus - clotrimazole cream 1% BID 2/26 -+ abx as per above    Hematology/DVT ppx  #Anticoagulation   #Thrombocytopenia  #Anemia   - Hb stable 12.4, platelets downtrending likely r/t shearing 2/2 ECMO circuit  - s/p 1 u PRBC 2/26 for O2 delivery  - c/w argatroban with goal 50-90  - LE duplex 2/25 neg    Endocrine  #DM  -A1c 6.7   - obesity with metabolic syndrome  - diabetes with steroid induced hypoglycemia  - c/w decadron and taper iso ARDS  - pituitary workup, steroids as above given recent weight gain but likely volume though  - c/w FS Q6h and moderate ISS, may need to adjust as tapering steroids    SKin/Lines  #Access  #Cellulitis   #DTI  - L subclavian TLC inserted 2/25   - L radial artery line 2/25  - RIJ ECMO cannula 2/26 at 20 cm  - R fem ECMO cannula 2/26 at 25 cm  - candidal intertrigo +/- cellulitis of the pannus - clotrimazole cream 1% BID 2/26 -+ abx as per above  - as per nursing staff, patient with ? DTI to right buttocks   - wound consult placed     GOC   - full code   - palliative involved  and following while on ECMO   - Mother involved in care and life partner

## 2025-03-01 NOTE — PROGRESS NOTE ADULT - SUBJECTIVE AND OBJECTIVE BOX
Interval Events:        HPI:  40 yo M w/ class III obesity, pAfib (no AC), HTN, BEA (on CPAP), history of b/l papilledema attributed to pseudotumor cerebri s/p LP in 2024 with very high opening pressure, who is transferred for VV ECMO for ARDS and severe hypoxia. Patient initially presented to [facility] on 2/24 for SOB and b/l LE edema. 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 He is a current every day smoker of Newports and marijuana, but does not drink or do other drugs. Currently lives with his mother. There is a long term partner in his life, but they are not , and they have an on/off relationship currently not very involved with each other as per the patient's mother and aunt.  At [facility] where he was getting an eval last year for shortness of breath, he had a sleep study and was diagnosed with sleep apnea, prescribed a PAP machine, which he has in his room but the mother is not sure how many nights per week he uses it. Recently, the last day or two, his mother and brother have been under the weather with URIs and the patient 2 days ago started to develop a runny nose, ough, some wheezing of the chest, as well as worsening shortness of breath and fevers at home. He called his aunt who told him to go get checked out and then he landed at the [facility] ED. Patient found to be reportedly hypoxemic to 70% on RA with worsening respiratory status/secretions and somnolence in the ED. Patient was intubated with difficulty (7 attempts) due to very large tongue secretions. Despite optimal vent management, patient remained hypoxemic. Unable to prone due to obesity. Patient cannulated for VV ECMO on 2/25 and transferred to [facility] for further management.     [facility] labs notable for MRSA PCR -, Fluvid -, urine legionella -, sputum gram stain  with GPC and GPR    CTA chest on 2/24 negative for pulmonary embolism, notable for patchy bilateral lower lobe opacities, hepatomegaly, mild ascites, edema/induration of the abdominal pannus.    LE duplex on 2/25 negative for DVT    TTE on 2/25 showed LV EF 61%, enlarged RV with TAPSE 2.1cm, ePASP at least 49 (Patient had 10/2024 TTE with normal LV and RV with ePASP 14).    Only home med is Lasix. Not on acetazolamide for pseudotumor or on Wegovy (was pending auth) (26 Feb 2025 01:48)      REVIEW OF SYSTEMS:  [x ] Unable to assess ROS because patient is sedated/intubated         Vital Signs Last 24 Hrs  T(C): 36 (01 Mar 2025 04:00), Max: 36.4 (28 Feb 2025 12:00)  T(F): 96.8 (01 Mar 2025 04:00), Max: 97.5 (28 Feb 2025 12:00)  HR: 62 (01 Mar 2025 06:00) (59 - 83)  BP: --  BP(mean): --  RR: 15 (01 Mar 2025 06:00) (13 - 23)  SpO2: 96% (01 Mar 2025 06:00) (91% - 100%)      I&O's Summary    27 Feb 2025 07:01  -  28 Feb 2025 07:00  --------------------------------------------------------  IN: 4714.1 mL / OUT: 7505 mL / NET: -2790.9 mL    28 Feb 2025 07:01  -  01 Mar 2025 06:04  --------------------------------------------------------  IN: 5372.9 mL / OUT: 6900 mL / NET: -1527.1 mL          ECMO SETTINGS:  Type:		[ ] Venovenous		[ ] Venoarterial  Cannulation Site(s):     Flow:  	          P1:                  Delta P:  RPM: 		  P2:                  SVO2:    Oxygenator:	Sweep:     L/min	FiO2:     ABG - ( 01 Mar 2025 03:30 )  pH: 7.36  /  pCO2: 56    /  pO2: 89    / HCO3: 32    / Base Excess: 4.8   /  SaO2: 97.3                  VENTILATOR SETTINGS:     Mode: AC/ CMV (Assist Control/ Continuous Mandatory Ventilation)  RR (machine): 14  FiO2: 40  PEEP: 18  ITime: 1  MAP: 22  PC: 12  PIP: 30      Physical Exam:   Constitutional: ill appearing, no acute distress   HEENT: + PERRLA, EOMI, no drainage or redness  Neck: supple,  No JVD, Right IJ ecmo cannula in place   Respiratory: Ventilator assisted breath Sounds diminished bilaterally to auscultation, no accessory muscle use noted  Cardiovascular: Regular rate, regular rhythm, normal S1, S2; no murmurs or rub  Gastrointestinal: Obese, soft, non distended, no hepatosplenomegaly, normal bowel sounds  Extremities: + 2 peripheral edema, no cyanosis, no clubbing   Vascular: Equal and normal pulses: 2+ peripheral pulses throughout  Neurological: sedated/intubated  Musculoskeletal: No joint swelling or deformity; no limitation of movement  Skin: warm, dry, well perfused, cellulitis to pannus area, DTI to right buttocks       LABS:                          12.0   18.89 )-----------( 116      ( 01 Mar 2025 03:30 )             42.9                          03-01    139  |  103  |  30[H]  ----------------------------<  205[H]  4.1   |  27  |  0.81    Ca    8.3[L]      01 Mar 2025 03:30  Phos  3.8     03-01  Mg     2.00     03-01    TPro  6.0  /  Alb  2.4[L]  /  TBili  1.2  /  DBili  x   /  AST  91[H]  /  ALT  64[H]  /  AlkPhos  56  03-01      LIVER FUNCTIONS - ( 01 Mar 2025 03:30 )  Alb: 2.4 g/dL / Pro: 6.0 g/dL / ALK PHOS: 56 U/L / ALT: 64 U/L / AST: 91 U/L / GGT: x             PT/INR - ( 01 Mar 2025 03:30 )   PT: 30.3 sec;   INR: 2.64 ratio         PTT - ( 01 Mar 2025 03:30 )  PTT:63.5 sec      Culture - Acid Fast - Bronchial w/Smear (collected 26 Feb 2025 08:59)  Source: Bronchial Bronchial Lavage    Culture - Fungal, Bronchial (collected 26 Feb 2025 08:59)  Source: Bronchial Bronchial Lavage  Preliminary Report (28 Feb 2025 08:36):    No growth    Culture - Bronchial (collected 26 Feb 2025 08:59)  Source: Bronchial Bronchial Lavage  Gram Stain (26 Feb 2025 14:11):    Moderate polymorphonuclear leukocytes seen per low power field    No squamous epithelial cells seen per low power field    No organisms seen per oil power field  Final Report (27 Feb 2025 18:30):    No growth          ACTIVE MEDS:    MEDICATIONS  (STANDING):  albuterol/ipratropium for Nebulization 3 milliLiter(s) Nebulizer every 6 hours  argatroban Infusion 1.1 MICROgram(s)/kG/Min (15.8 mL/Hr) IV Continuous <Continuous>  chlorhexidine 0.12% Liquid 15 milliLiter(s) Oral Mucosa every 12 hours  chlorhexidine 2% Cloths 1 Application(s) Topical <User Schedule>  clotrimazole 1% Cream 1 Application(s) Topical two times a day  dexAMETHasone  IVPB 20 milliGRAM(s) IV Intermittent daily  dexMEDEtomidine Infusion 1 MICROgram(s)/kG/Hr (60.2 mL/Hr) IV Continuous <Continuous>  dextrose 50% Injectable 25 Gram(s) IV Push once  dextrose 50% Injectable 12.5 Gram(s) IV Push once  dextrose 50% Injectable 25 Gram(s) IV Push once  glucagon  Injectable 1 milliGRAM(s) IntraMuscular once  HYDROmorphone Infusion. 1 mG/Hr (1 mL/Hr) IV Continuous <Continuous>  insulin lispro (ADMELOG) corrective regimen sliding scale   SubCutaneous every 6 hours  multivitamin/minerals/iron Oral Solution (CENTRUM) 15 milliLiter(s) Oral daily  mupirocin 2% Ointment 1 Application(s) Topical two times a day  niCARdipine Infusion 10 mG/Hr (50 mL/Hr) IV Continuous <Continuous>  pantoprazole  Injectable 40 milliGRAM(s) IV Push daily  petrolatum Ophthalmic Ointment 1 Application(s) Both EYES every 12 hours  piperacillin/tazobactam IVPB.. 4.5 Gram(s) IV Intermittent every 8 hours  polyethylene glycol 3350 17 Gram(s) Oral two times a day  propofol Infusion 25 MICROgram(s)/kG/Min (36.1 mL/Hr) IV Continuous <Continuous>  senna Syrup 10 milliLiter(s) Oral two times a day  sodium chloride 3%  Inhalation 4 milliLiter(s) Inhalation every 6 hours  vancomycin  IVPB      vancomycin  IVPB 1250 milliGRAM(s) IV Intermittent every 8 hours       Interval Events:        HPI:  38 yo M w/ class III obesity, pAfib (no AC), HTN, BEA (on CPAP), history of b/l papilledema attributed to pseudotumor cerebri s/p LP in 2024 with very high opening pressure, who is transferred for VV ECMO for ARDS and severe hypoxia. Patient initially presented to [facility] on 2/24 for SOB and b/l LE edema. 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 He is a current every day smoker of Newports and marijuana, but does not drink or do other drugs. Currently lives with his mother. There is a long term partner in his life, but they are not , and they have an on/off relationship currently not very involved with each other as per the patient's mother and aunt.  At [facility] where he was getting an eval last year for shortness of breath, he had a sleep study and was diagnosed with sleep apnea, prescribed a PAP machine, which he has in his room but the mother is not sure how many nights per week he uses it. Recently, the last day or two, his mother and brother have been under the weather with URIs and the patient 2 days ago started to develop a runny nose, ough, some wheezing of the chest, as well as worsening shortness of breath and fevers at home. He called his aunt who told him to go get checked out and then he landed at the [facility] ED. Patient found to be reportedly hypoxemic to 70% on RA with worsening respiratory status/secretions and somnolence in the ED. Patient was intubated with difficulty (7 attempts) due to very large tongue secretions. Despite optimal vent management, patient remained hypoxemic. Unable to prone due to obesity. Patient cannulated for VV ECMO on 2/25 and transferred to [facility] for further management.     [facility] labs notable for MRSA PCR -, Fluvid -, urine legionella -, sputum gram stain  with GPC and GPR    CTA chest on 2/24 negative for pulmonary embolism, notable for patchy bilateral lower lobe opacities, hepatomegaly, mild ascites, edema/induration of the abdominal pannus.    LE duplex on 2/25 negative for DVT    TTE on 2/25 showed LV EF 61%, enlarged RV with TAPSE 2.1cm, ePASP at least 49 (Patient had 10/2024 TTE with normal LV and RV with ePASP 14).    Only home med is Lasix. Not on acetazolamide for pseudotumor or on Wegovy (was pending auth) (26 Feb 2025 01:48)      REVIEW OF SYSTEMS:  [x ] Unable to assess ROS because patient is sedated/intubated         Vital Signs Last 24 Hrs  T(C): 36 (01 Mar 2025 04:00), Max: 36.4 (28 Feb 2025 12:00)  T(F): 96.8 (01 Mar 2025 04:00), Max: 97.5 (28 Feb 2025 12:00)  HR: 62 (01 Mar 2025 06:00) (59 - 83)  BP: --  BP(mean): --  RR: 15 (01 Mar 2025 06:00) (13 - 23)  SpO2: 96% (01 Mar 2025 06:00) (91% - 100%)      I&O's Summary    27 Feb 2025 07:01  -  28 Feb 2025 07:00  --------------------------------------------------------  IN: 4714.1 mL / OUT: 7505 mL / NET: -2790.9 mL    28 Feb 2025 07:01  -  01 Mar 2025 06:04  --------------------------------------------------------  IN: 5372.9 mL / OUT: 6900 mL / NET: -1527.1 mL          ECMO SETTINGS:  Type:		[ ] Venovenous		[ ] Venoarterial  Cannulation Site(s):     Flow:  5.14	          P1: 215                 Delta P: 42  RPM:  3700	          P2: 173                 SVO2:  83    Oxygenator:	Sweep:   3.5  L/min	FiO2: 1    ABG - ( 01 Mar 2025 03:30 )  pH: 7.36  /  pCO2: 56    /  pO2: 89    / HCO3: 32    / Base Excess: 4.8   /  SaO2: 97.3                  VENTILATOR SETTINGS:     Mode: AC/ CMV (Assist Control/ Continuous Mandatory Ventilation)  RR (machine): 14  FiO2: 40  PEEP: 18  ITime: 1  MAP: 22  PC: 12  PIP: 30      Physical Exam:   Constitutional: ill appearing, no acute distress   HEENT: + PERRLA, EOMI, no drainage or redness  Neck: supple,  No JVD, Right IJ ecmo cannula in place   Respiratory: Ventilator assisted breath Sounds diminished bilaterally to auscultation, no accessory muscle use noted  Cardiovascular: Regular rate, regular rhythm, normal S1, S2; no murmurs or rub  Gastrointestinal: Obese, soft, non distended, no hepatosplenomegaly, normal bowel sounds  Extremities: + 2 peripheral edema, no cyanosis, no clubbing   Vascular: Equal and normal pulses: 2+ peripheral pulses throughout  Neurological: sedated/intubated  Musculoskeletal: No joint swelling or deformity; no limitation of movement  Skin: warm, dry, well perfused, cellulitis to pannus area, DTI to right buttocks       LABS:                          12.0   18.89 )-----------( 116      ( 01 Mar 2025 03:30 )             42.9                          03-01    139  |  103  |  30[H]  ----------------------------<  205[H]  4.1   |  27  |  0.81    Ca    8.3[L]      01 Mar 2025 03:30  Phos  3.8     03-01  Mg     2.00     03-01    TPro  6.0  /  Alb  2.4[L]  /  TBili  1.2  /  DBili  x   /  AST  91[H]  /  ALT  64[H]  /  AlkPhos  56  03-01      LIVER FUNCTIONS - ( 01 Mar 2025 03:30 )  Alb: 2.4 g/dL / Pro: 6.0 g/dL / ALK PHOS: 56 U/L / ALT: 64 U/L / AST: 91 U/L / GGT: x             PT/INR - ( 01 Mar 2025 03:30 )   PT: 30.3 sec;   INR: 2.64 ratio         PTT - ( 01 Mar 2025 03:30 )  PTT:63.5 sec      Culture - Acid Fast - Bronchial w/Smear (collected 26 Feb 2025 08:59)  Source: Bronchial Bronchial Lavage    Culture - Fungal, Bronchial (collected 26 Feb 2025 08:59)  Source: Bronchial Bronchial Lavage  Preliminary Report (28 Feb 2025 08:36):    No growth    Culture - Bronchial (collected 26 Feb 2025 08:59)  Source: Bronchial Bronchial Lavage  Gram Stain (26 Feb 2025 14:11):    Moderate polymorphonuclear leukocytes seen per low power field    No squamous epithelial cells seen per low power field    No organisms seen per oil power field  Final Report (27 Feb 2025 18:30):    No growth          ACTIVE MEDS:    MEDICATIONS  (STANDING):  albuterol/ipratropium for Nebulization 3 milliLiter(s) Nebulizer every 6 hours  argatroban Infusion 1.1 MICROgram(s)/kG/Min (15.8 mL/Hr) IV Continuous <Continuous>  chlorhexidine 0.12% Liquid 15 milliLiter(s) Oral Mucosa every 12 hours  chlorhexidine 2% Cloths 1 Application(s) Topical <User Schedule>  clotrimazole 1% Cream 1 Application(s) Topical two times a day  dexAMETHasone  IVPB 20 milliGRAM(s) IV Intermittent daily  dexMEDEtomidine Infusion 1 MICROgram(s)/kG/Hr (60.2 mL/Hr) IV Continuous <Continuous>  dextrose 50% Injectable 25 Gram(s) IV Push once  dextrose 50% Injectable 12.5 Gram(s) IV Push once  dextrose 50% Injectable 25 Gram(s) IV Push once  glucagon  Injectable 1 milliGRAM(s) IntraMuscular once  HYDROmorphone Infusion. 1 mG/Hr (1 mL/Hr) IV Continuous <Continuous>  insulin lispro (ADMELOG) corrective regimen sliding scale   SubCutaneous every 6 hours  multivitamin/minerals/iron Oral Solution (CENTRUM) 15 milliLiter(s) Oral daily  mupirocin 2% Ointment 1 Application(s) Topical two times a day  niCARdipine Infusion 10 mG/Hr (50 mL/Hr) IV Continuous <Continuous>  pantoprazole  Injectable 40 milliGRAM(s) IV Push daily  petrolatum Ophthalmic Ointment 1 Application(s) Both EYES every 12 hours  piperacillin/tazobactam IVPB.. 4.5 Gram(s) IV Intermittent every 8 hours  polyethylene glycol 3350 17 Gram(s) Oral two times a day  propofol Infusion 25 MICROgram(s)/kG/Min (36.1 mL/Hr) IV Continuous <Continuous>  senna Syrup 10 milliLiter(s) Oral two times a day  sodium chloride 3%  Inhalation 4 milliLiter(s) Inhalation every 6 hours  vancomycin  IVPB      vancomycin  IVPB 1250 milliGRAM(s) IV Intermittent every 8 hours       Interval Events: No acute events reported overnight. Remained on precedex, propofol and dilaudid gtt.         HPI:  38 yo M w/ class III obesity, pAfib (no AC), HTN, BEA (on CPAP), history of b/l papilledema attributed to pseudotumor cerebri s/p LP in 2024 with very high opening pressure, who is transferred for VV ECMO for ARDS and severe hypoxia. Patient initially presented to [facility] on 2/24 for SOB and b/l LE edema. 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 He is a current every day smoker of Newports and marijuana, but does not drink or do other drugs. Currently lives with his mother. There is a long term partner in his life, but they are not , and they have an on/off relationship currently not very involved with each other as per the patient's mother and aunt.  At [facility] where he was getting an eval last year for shortness of breath, he had a sleep study and was diagnosed with sleep apnea, prescribed a PAP machine, which he has in his room but the mother is not sure how many nights per week he uses it. Recently, the last day or two, his mother and brother have been under the weather with URIs and the patient 2 days ago started to develop a runny nose, ough, some wheezing of the chest, as well as worsening shortness of breath and fevers at home. He called his aunt who told him to go get checked out and then he landed at the [facility] [place] [facility] ED. Patient found to be reportedly hypoxemic to 70% on RA with worsening respiratory status/secretions and somnolence in the ED. Patient was intubated with difficulty (7 attempts) due to very large tongue secretions. Despite optimal vent management, patient remained hypoxemic. Unable to prone due to obesity. Patient cannulated for VV ECMO on 2/25 and transferred to [facility] for further management.     [facility] labs notable for MRSA PCR -, Fluvid -, urine legionella -, sputum gram stain  with GPC and GPR    CTA chest on 2/24 negative for pulmonary embolism, notable for patchy bilateral lower lobe opacities, hepatomegaly, mild ascites, edema/induration of the abdominal pannus.    LE duplex on 2/25 negative for DVT    TTE on 2/25 showed LV EF 61%, enlarged RV with TAPSE 2.1cm, ePASP at least 49 (Patient had 10/2024 TTE with normal LV and RV with ePASP 14).    Only home med is Lasix. Not on acetazolamide for pseudotumor or on Wegovy (was pending auth) (26 Feb 2025 01:48)      REVIEW OF SYSTEMS:  [x ] Unable to assess ROS because patient is sedated/intubated         Vital Signs Last 24 Hrs  T(C): 36 (01 Mar 2025 04:00), Max: 36.4 (28 Feb 2025 12:00)  T(F): 96.8 (01 Mar 2025 04:00), Max: 97.5 (28 Feb 2025 12:00)  HR: 62 (01 Mar 2025 06:00) (59 - 83)  BP: --  BP(mean): --  RR: 15 (01 Mar 2025 06:00) (13 - 23)  SpO2: 96% (01 Mar 2025 06:00) (91% - 100%)      I&O's Summary    27 Feb 2025 07:01  -  28 Feb 2025 07:00  --------------------------------------------------------  IN: 4714.1 mL / OUT: 7505 mL / NET: -2790.9 mL    28 Feb 2025 07:01  -  01 Mar 2025 06:04  --------------------------------------------------------  IN: 5372.9 mL / OUT: 6900 mL / NET: -1527.1 mL          ECMO SETTINGS:  Type:		[ ] Venovenous		[ ] Venoarterial  Cannulation Site(s):     Flow:  5.14	          P1: 215                 Delta P: 42  RPM:  3700	          P2: 173                 SVO2:  83    Oxygenator:	Sweep:   3.5  L/min	FiO2: 1    ABG - ( 01 Mar 2025 03:30 )  pH: 7.36  /  pCO2: 56    /  pO2: 89    / HCO3: 32    / Base Excess: 4.8   /  SaO2: 97.3                  VENTILATOR SETTINGS:     Mode: AC/ CMV (Assist Control/ Continuous Mandatory Ventilation)  RR (machine): 14  FiO2: 40  PEEP: 18  ITime: 1  MAP: 22  PC: 12  PIP: 30      Physical Exam:   Constitutional: ill appearing, no acute distress   HEENT: + PERRLA, EOMI, no drainage or redness  Neck: supple,  No JVD, Right IJ ecmo cannula in place   Respiratory: Ventilator assisted breath Sounds diminished bilaterally to auscultation, no accessory muscle use noted  Cardiovascular: Regular rate, regular rhythm, normal S1, S2; no murmurs or rub  Gastrointestinal: Obese, soft, non distended, no hepatosplenomegaly, normal bowel sounds  Extremities: + 2 peripheral edema, no cyanosis, no clubbing   Vascular: Equal and normal pulses: 2+ peripheral pulses throughout  Neurological: sedated/intubated  Musculoskeletal: No joint swelling or deformity; no limitation of movement  Skin: warm, dry, well perfused, cellulitis to pannus area, DTI to right buttocks       LABS:                          12.0   18.89 )-----------( 116      ( 01 Mar 2025 03:30 )             42.9                          03-01    139  |  103  |  30[H]  ----------------------------<  205[H]  4.1   |  27  |  0.81    Ca    8.3[L]      01 Mar 2025 03:30  Phos  3.8     03-01  Mg     2.00     03-01    TPro  6.0  /  Alb  2.4[L]  /  TBili  1.2  /  DBili  x   /  AST  91[H]  /  ALT  64[H]  /  AlkPhos  56  03-01      LIVER FUNCTIONS - ( 01 Mar 2025 03:30 )  Alb: 2.4 g/dL / Pro: 6.0 g/dL / ALK PHOS: 56 U/L / ALT: 64 U/L / AST: 91 U/L / GGT: x             PT/INR - ( 01 Mar 2025 03:30 )   PT: 30.3 sec;   INR: 2.64 ratio         PTT - ( 01 Mar 2025 03:30 )  PTT:63.5 sec      Culture - Acid Fast - Bronchial w/Smear (collected 26 Feb 2025 08:59)  Source: Bronchial Bronchial Lavage    Culture - Fungal, Bronchial (collected 26 Feb 2025 08:59)  Source: Bronchial Bronchial Lavage  Preliminary Report (28 Feb 2025 08:36):    No growth    Culture - Bronchial (collected 26 Feb 2025 08:59)  Source: Bronchial Bronchial Lavage  Gram Stain (26 Feb 2025 14:11):    Moderate polymorphonuclear leukocytes seen per low power field    No squamous epithelial cells seen per low power field    No organisms seen per oil power field  Final Report (27 Feb 2025 18:30):    No growth          ACTIVE MEDS:    MEDICATIONS  (STANDING):  albuterol/ipratropium for Nebulization 3 milliLiter(s) Nebulizer every 6 hours  argatroban Infusion 1.1 MICROgram(s)/kG/Min (15.8 mL/Hr) IV Continuous <Continuous>  chlorhexidine 0.12% Liquid 15 milliLiter(s) Oral Mucosa every 12 hours  chlorhexidine 2% Cloths 1 Application(s) Topical <User Schedule>  clotrimazole 1% Cream 1 Application(s) Topical two times a day  dexAMETHasone  IVPB 20 milliGRAM(s) IV Intermittent daily  dexMEDEtomidine Infusion 1 MICROgram(s)/kG/Hr (60.2 mL/Hr) IV Continuous <Continuous>  dextrose 50% Injectable 25 Gram(s) IV Push once  dextrose 50% Injectable 12.5 Gram(s) IV Push once  dextrose 50% Injectable 25 Gram(s) IV Push once  glucagon  Injectable 1 milliGRAM(s) IntraMuscular once  HYDROmorphone Infusion. 1 mG/Hr (1 mL/Hr) IV Continuous <Continuous>  insulin lispro (ADMELOG) corrective regimen sliding scale   SubCutaneous every 6 hours  multivitamin/minerals/iron Oral Solution (CENTRUM) 15 milliLiter(s) Oral daily  mupirocin 2% Ointment 1 Application(s) Topical two times a day  niCARdipine Infusion 10 mG/Hr (50 mL/Hr) IV Continuous <Continuous>  pantoprazole  Injectable 40 milliGRAM(s) IV Push daily  petrolatum Ophthalmic Ointment 1 Application(s) Both EYES every 12 hours  piperacillin/tazobactam IVPB.. 4.5 Gram(s) IV Intermittent every 8 hours  polyethylene glycol 3350 17 Gram(s) Oral two times a day  propofol Infusion 25 MICROgram(s)/kG/Min (36.1 mL/Hr) IV Continuous <Continuous>  senna Syrup 10 milliLiter(s) Oral two times a day  sodium chloride 3%  Inhalation 4 milliLiter(s) Inhalation every 6 hours  vancomycin  IVPB      vancomycin  IVPB 1250 milliGRAM(s) IV Intermittent every 8 hours

## 2025-03-01 NOTE — PROCEDURE NOTE - NSBRONCHFINDINGS_GEN_A_CORE_FT
ETT in appropriate position.  Small amount of old, dried blood at R and L mainstem, not occluding airway.  Small amount of old, dried blood in BI and LLL, easily aspirated.  No active bleeding noted anywhere.  No endobronchial lesions.

## 2025-03-01 NOTE — PROCEDURE NOTE - NSBRONCHPROCDETAILS_GEN_A_CORE_FT
Bronchoscope inserted through ETT to level of jerardo.  Therapeutic aspiration of secretions from both bronchial trees.  BAL done of LLL with 30 cc of NS.  Bronchoscope removed from ETT.

## 2025-03-01 NOTE — PROGRESS NOTE ADULT - CRITICAL CARE ATTENDING COMMENT
39 year old male with Class 3 obesity, pAF, HTN, BEA, and history of papilledema attributed to pseudotumor cerebri transferred here from [facility] 2/24/25 for VV-ECMO. He presented with acute hypoxemic respiratory failure and septic shock due to suspected multifocal pneumonia. There is a component of volume overload and right ventricular dysfunction as well. He was intubated 2/25/25 for development of severe ARDS.    NEURO: Sedated with propofol and hydromorphone, on precedex, will start seroquel. Cisatracurium stopped 2/27.     CVS: Nicardipine off for hypertension. Continue diuresis today as he remains hypervolemic.  ROBERT showed:    PULM: VVECMO Support: FdO2 1, Sweep 3, Flow 5. Ventilator settings: Pressure AC Pinsp 12 PEEP 18 Rate 12, FiO2 100. TV approximately 300 cc.  Bronch showed ___    GI: NPO. Continue enteral feeds. Follow up with nutrition to optimize. Bowel regimen - more relistor today    RENAL: Acute kidney injury. Creatinine improved. Plan to diurese as above.    ENDO: SSI    ID: Empiric zosyn, stopped vanco.  Repeat bal today. Follow up culture results.    HEME: Transfused 1 unit overnight 2/26 to optimize oxygenation. No evidence of bleeding.  HIT panel for plt.    PPX: Argatroban while on ECMO.    Full Code    Prognosis guarded.    ECMO settings reviewed, including Pre and Post membrane ABGs, transmembrane pressures, flow, oxygenator function, and cannula positions. Discussed with team, including nursing and perfusion.  Total time spent on VV ECMO management was 15 minutes, separate from time spent on critical care management, procedures, and teaching. 39 year old male with Class 3 obesity, pAF, HTN, BEA, and history of papilledema attributed to pseudotumor cerebri transferred here from [facility] 2/24/25 for VV-ECMO. He presented with acute hypoxemic respiratory failure and septic shock due to suspected multifocal pneumonia. There is a component of volume overload and right ventricular dysfunction as well. He was intubated 2/25/25 for development of severe ARDS.    NEURO: Sedated with propofol and hydromorphone, on precedex, will start seroquel. Cisatracurium stopped 2/27.     CVS: Nicardipine off for hypertension. Continue diuresis today as he remains hypervolemic.  ROBERT showed RV thickening but not bigger than LV.  Able to increase PEEP to 24 without RV dilation.    PULM: VVECMO Support: FdO2 1, Sweep 3, Flow 5. Ventilator settings: Pressure AC Pinsp 12 PEEP 18 Rate 12, FiO2 100. TV approximately 300 cc.  ABG as above. Bronch showed some old dried blood.  TELUS showed moderate consolidations at bases of both lungs and PEEP increased to 24.  No change in blood pressure.    GI: NPO. Continue enteral feeds. Follow up with nutrition to optimize. Bowel regimen - more relistor today    RENAL: Acute kidney injury. Creatinine improved. Plan to diurese with lasix 40 mg IV tid for now to keep net negative    ENDO: SSI    ID: Empiric zosyn, stopped vanco.  Repeat bal today. Follow up culture results.    HEME: Transfused 1 unit overnight 2/26 to optimize oxygenation. No evidence of bleeding.  HIT panel for plt.    PPX: Argatroban while on ECMO.    Full Code    Prognosis guarded.    ECMO settings reviewed, including Pre and Post membrane ABGs, transmembrane pressures, flow, oxygenator function, and cannula positions. Discussed with team, including nursing and perfusion.  Total time spent on VV ECMO management was 15 minutes, separate from time spent on critical care management, procedures, and teaching.

## 2025-03-01 NOTE — CHART NOTE - NSCHARTNOTEFT_GEN_A_CORE
:  Dixon Orr    INDICATION: Respiratory failure    PROCEDURE:   [x] LIMITED ECHO  [x] LIMITED CHEST    FINDINGS:   Lungs: bilateral apical b-line pattern with irregular pleura  Cardiac:  - exam limited by body habitus  - grossly normal LV systolic function  - no significant AI or MR  - EPSS 1.01  - LVOTd 2.46, LVOTa 4.75  - LVOT VTI 10.6 (likely underestimate by angle)  - MV E 80, A 42, E/A 1.86, lateral e' 8.2, E/e' 9.7  - RV enlarged with cardiac apex being R shifted  - RVOT AT 100ms  - RVOT VTI 18.3  - TAPSE 2  - TR Vmax 2.5, PG 26  - IVCd 2.65  - portal vein with >50% pulsatility      INTERPRETATION:   Lung injury pattern  Grossly normal LV systolic and diastolic function  Enlarged RV with likely preserved function  Estimated RAP 15, PASP 41      Supervised by Dr. Bertrand  Images stored in qpath  Should not be considered final until signed by attending.

## 2025-03-01 NOTE — CHART NOTE - NSCHARTNOTEFT_GEN_A_CORE
Procedure: Transesophageal Echocardiogram  Indication:  ECMO  Consent: Written, in chart  Operators: Elza Orr  Anesthesia:  Fentanyl, Versed, Propofol, Nimbex  Preparation: ROBERT probe passed into esophagus using glidescope for visualization  Findings:    AV:  Structurally normal, no significant regurgitation    MV: Structurally normal, no significant regurgitation    PV:  Not well visualized    TV: Structurally normal, mild regurgitation seen    LV: Normal systolic function. LVOT diameter 2.5cm    RV:  Mildly enlarged, mildly increased wall thickness    SVC:  Return cannula noted. No respirophasic variation    IVC: Dilated to 2.5cm. Unable to visualize pull cannula well    Aorta: No significant atherosclerosis or pathology    PA: Not well visualized    Doppler:   - diastology: E 100, A 30, E/A 3.3, e' 9.29, E/e' 10.8    Other:   TELUS:  - left - significant consolidation without mobile air bronchograms or color flow doppler  - right - moderate pleural effusion, significant consolidation    PEEP was gradually increased sequentially from 18 to 24 without signs of RV dilation. Left lung with some B lines after increase in PEEP.    Assessment and Plan:   - Normal LV function  - RV enlarged but with normal function.   - Mild TR, valves otherwise wnl  - PEEP increased to 24 without signs of RV dilation Procedure: Transesophageal Echocardiogram  Indication:  ECMO, acute hypoxemic respiratory failure   Consent: Written, in chart  Operators: Elza Orr  Anesthesia:  Fentanyl, Versed, Propofol, Nimbex  Preparation: ROBERT probe passed into esophagus using glidescope for visualization  Findings:    AV:  Structurally normal, no significant regurgitation    MV: Structurally normal, no significant regurgitation    PV:  Not well visualized    TV: Structurally normal, mild regurgitation seen    LV: Normal systolic function. LVOT diameter 2.5cm by PW doppler    RV:  Mildly enlarged, mildly increased wall thickness    SVC:  Return cannula noted. No respirophasic variation    IVC: Dilated to 2.5cm. Unable to visualize pull cannula well    Aorta: No significant atherosclerosis or pathology    PA: Not well visualized    Doppler:   - diastology: E 100, A 30, E/A 3.3, e' 9.29, E/e' 10.8    Other:   TELUS:  - left - significant consolidation without mobile air bronchograms or color flow doppler  - right - moderate pleural effusion, significant consolidation    PEEP was gradually increased sequentially from 18 to 24 without signs of RV dilation. Left lung with some B lines after increase in PEEP.    Assessment and Plan:   - Normal LV function  - RV enlarged but with normal function.   - Mild TR, valves otherwise wnl  - PEEP increased to 24 without signs of RV dilation    Attending Attestation:  I was present during the key portions of the procedure and immediately available during the entire procedure.  I agree with the above.  Selene Bertrand MD  Attending  Pulmonary & Critical Care Medicine

## 2025-03-02 LAB
ALBUMIN SERPL ELPH-MCNC: 2.5 G/DL — LOW (ref 3.3–5)
ALBUMIN SERPL ELPH-MCNC: 2.6 G/DL — LOW (ref 3.3–5)
ALP SERPL-CCNC: 54 U/L — SIGNIFICANT CHANGE UP (ref 40–120)
ALP SERPL-CCNC: 54 U/L — SIGNIFICANT CHANGE UP (ref 40–120)
ALP SERPL-CCNC: 56 U/L — SIGNIFICANT CHANGE UP (ref 40–120)
ALP SERPL-CCNC: 57 U/L — SIGNIFICANT CHANGE UP (ref 40–120)
ALT FLD-CCNC: 67 U/L — HIGH (ref 4–41)
ALT FLD-CCNC: 67 U/L — HIGH (ref 4–41)
ALT FLD-CCNC: 70 U/L — HIGH (ref 4–41)
ALT FLD-CCNC: 75 U/L — HIGH (ref 4–41)
ANION GAP SERPL CALC-SCNC: 11 MMOL/L — SIGNIFICANT CHANGE UP (ref 7–14)
ANION GAP SERPL CALC-SCNC: 6 MMOL/L — LOW (ref 7–14)
ANION GAP SERPL CALC-SCNC: 9 MMOL/L — SIGNIFICANT CHANGE UP (ref 7–14)
ANION GAP SERPL CALC-SCNC: 9 MMOL/L — SIGNIFICANT CHANGE UP (ref 7–14)
ANISOCYTOSIS BLD QL: SLIGHT — SIGNIFICANT CHANGE UP
APTT BLD: 62.9 SEC — HIGH (ref 24.5–35.6)
APTT BLD: 65.3 SEC — HIGH (ref 24.5–35.6)
APTT BLD: 66.2 SEC — HIGH (ref 24.5–35.6)
APTT BLD: 69.7 SEC — HIGH (ref 24.5–35.6)
AST SERPL-CCNC: 63 U/L — HIGH (ref 4–40)
AST SERPL-CCNC: 64 U/L — HIGH (ref 4–40)
AST SERPL-CCNC: 69 U/L — HIGH (ref 4–40)
AST SERPL-CCNC: 71 U/L — HIGH (ref 4–40)
BASOPHILS # BLD AUTO: 0 K/UL — SIGNIFICANT CHANGE UP (ref 0–0.2)
BASOPHILS # BLD AUTO: 0.01 K/UL — SIGNIFICANT CHANGE UP (ref 0–0.2)
BASOPHILS # BLD AUTO: 0.02 K/UL — SIGNIFICANT CHANGE UP (ref 0–0.2)
BASOPHILS # BLD AUTO: 0.02 K/UL — SIGNIFICANT CHANGE UP (ref 0–0.2)
BASOPHILS NFR BLD AUTO: 0 % — SIGNIFICANT CHANGE UP (ref 0–2)
BASOPHILS NFR BLD AUTO: 0.1 % — SIGNIFICANT CHANGE UP (ref 0–2)
BILIRUB SERPL-MCNC: 1.2 MG/DL — SIGNIFICANT CHANGE UP (ref 0.2–1.2)
BILIRUB SERPL-MCNC: 1.2 MG/DL — SIGNIFICANT CHANGE UP (ref 0.2–1.2)
BILIRUB SERPL-MCNC: 1.3 MG/DL — HIGH (ref 0.2–1.2)
BILIRUB SERPL-MCNC: 1.3 MG/DL — HIGH (ref 0.2–1.2)
BLOOD GAS ARTERIAL - LYTES,HGB,ICA,LACT RESULT: SIGNIFICANT CHANGE UP
BLOOD GAS ECMO POST MEMBRANE - ARTERIAL RESULT: SIGNIFICANT CHANGE UP
BLOOD GAS ECMO PRE MEMBRANE - VENOUS RESULT: SIGNIFICANT CHANGE UP
BUN SERPL-MCNC: 33 MG/DL — HIGH (ref 7–23)
BUN SERPL-MCNC: 36 MG/DL — HIGH (ref 7–23)
BUN SERPL-MCNC: 38 MG/DL — HIGH (ref 7–23)
BUN SERPL-MCNC: 39 MG/DL — HIGH (ref 7–23)
CA-I BLD-SCNC: 1.07 MMOL/L — LOW (ref 1.15–1.29)
CA-I BLD-SCNC: 1.11 MMOL/L — LOW (ref 1.15–1.29)
CALCIUM SERPL-MCNC: 8 MG/DL — LOW (ref 8.4–10.5)
CALCIUM SERPL-MCNC: 8 MG/DL — LOW (ref 8.4–10.5)
CALCIUM SERPL-MCNC: 8.2 MG/DL — LOW (ref 8.4–10.5)
CALCIUM SERPL-MCNC: 8.3 MG/DL — LOW (ref 8.4–10.5)
CHLORIDE SERPL-SCNC: 103 MMOL/L — SIGNIFICANT CHANGE UP (ref 98–107)
CHLORIDE SERPL-SCNC: 106 MMOL/L — SIGNIFICANT CHANGE UP (ref 98–107)
CHLORIDE SERPL-SCNC: 107 MMOL/L — SIGNIFICANT CHANGE UP (ref 98–107)
CHLORIDE SERPL-SCNC: 107 MMOL/L — SIGNIFICANT CHANGE UP (ref 98–107)
CO2 SERPL-SCNC: 27 MMOL/L — SIGNIFICANT CHANGE UP (ref 22–31)
CO2 SERPL-SCNC: 27 MMOL/L — SIGNIFICANT CHANGE UP (ref 22–31)
CO2 SERPL-SCNC: 29 MMOL/L — SIGNIFICANT CHANGE UP (ref 22–31)
CO2 SERPL-SCNC: 30 MMOL/L — SIGNIFICANT CHANGE UP (ref 22–31)
CREAT SERPL-MCNC: 0.76 MG/DL — SIGNIFICANT CHANGE UP (ref 0.5–1.3)
CREAT SERPL-MCNC: 0.79 MG/DL — SIGNIFICANT CHANGE UP (ref 0.5–1.3)
CREAT SERPL-MCNC: 0.84 MG/DL — SIGNIFICANT CHANGE UP (ref 0.5–1.3)
CREAT SERPL-MCNC: 0.84 MG/DL — SIGNIFICANT CHANGE UP (ref 0.5–1.3)
CULTURE RESULTS: SIGNIFICANT CHANGE UP
CULTURE RESULTS: SIGNIFICANT CHANGE UP
DACRYOCYTES BLD QL SMEAR: SLIGHT — SIGNIFICANT CHANGE UP
EGFR: 115 ML/MIN/1.73M2 — SIGNIFICANT CHANGE UP
EGFR: 117 ML/MIN/1.73M2 — SIGNIFICANT CHANGE UP
EGFR: 117 ML/MIN/1.73M2 — SIGNIFICANT CHANGE UP
EGFR: 119 ML/MIN/1.73M2 — SIGNIFICANT CHANGE UP
EGFR: 119 ML/MIN/1.73M2 — SIGNIFICANT CHANGE UP
EOSINOPHIL # BLD AUTO: 0 K/UL — SIGNIFICANT CHANGE UP (ref 0–0.5)
EOSINOPHIL # BLD AUTO: 0 K/UL — SIGNIFICANT CHANGE UP (ref 0–0.5)
EOSINOPHIL # BLD AUTO: 0.01 K/UL — SIGNIFICANT CHANGE UP (ref 0–0.5)
EOSINOPHIL # BLD AUTO: 0.02 K/UL — SIGNIFICANT CHANGE UP (ref 0–0.5)
EOSINOPHIL NFR BLD AUTO: 0 % — SIGNIFICANT CHANGE UP (ref 0–6)
EOSINOPHIL NFR BLD AUTO: 0 % — SIGNIFICANT CHANGE UP (ref 0–6)
EOSINOPHIL NFR BLD AUTO: 0.1 % — SIGNIFICANT CHANGE UP (ref 0–6)
EOSINOPHIL NFR BLD AUTO: 0.1 % — SIGNIFICANT CHANGE UP (ref 0–6)
FIBRINOGEN PPP-MCNC: 193 MG/DL — LOW (ref 200–465)
FIBRINOGEN PPP-MCNC: 223 MG/DL — SIGNIFICANT CHANGE UP (ref 200–465)
GLUCOSE BLDC GLUCOMTR-MCNC: 156 MG/DL — HIGH (ref 70–99)
GLUCOSE BLDC GLUCOMTR-MCNC: 172 MG/DL — HIGH (ref 70–99)
GLUCOSE BLDC GLUCOMTR-MCNC: 186 MG/DL — HIGH (ref 70–99)
GLUCOSE SERPL-MCNC: 197 MG/DL — HIGH (ref 70–99)
GLUCOSE SERPL-MCNC: 203 MG/DL — HIGH (ref 70–99)
GLUCOSE SERPL-MCNC: 218 MG/DL — HIGH (ref 70–99)
GLUCOSE SERPL-MCNC: 222 MG/DL — HIGH (ref 70–99)
HAPTOGLOB SERPL-MCNC: <20 MG/DL — LOW (ref 34–200)
HCT VFR BLD CALC: 41.1 % — SIGNIFICANT CHANGE UP (ref 39–50)
HCT VFR BLD CALC: 41.2 % — SIGNIFICANT CHANGE UP (ref 39–50)
HCT VFR BLD CALC: 42.2 % — SIGNIFICANT CHANGE UP (ref 39–50)
HCT VFR BLD CALC: 42.3 % — SIGNIFICANT CHANGE UP (ref 39–50)
HGB BLD-MCNC: 11.9 G/DL — LOW (ref 13–17)
HGB BLD-MCNC: 11.9 G/DL — LOW (ref 13–17)
HGB BLD-MCNC: 12.1 G/DL — LOW (ref 13–17)
HGB BLD-MCNC: 12.2 G/DL — LOW (ref 13–17)
HYPOCHROMIA BLD QL: SLIGHT — SIGNIFICANT CHANGE UP
IANC: 16.9 K/UL — HIGH (ref 1.8–7.4)
IANC: 17.11 K/UL — HIGH (ref 1.8–7.4)
IANC: 17.85 K/UL — HIGH (ref 1.8–7.4)
IANC: 18.19 K/UL — HIGH (ref 1.8–7.4)
IMM GRANULOCYTES NFR BLD AUTO: 0.8 % — SIGNIFICANT CHANGE UP (ref 0–0.9)
IMM GRANULOCYTES NFR BLD AUTO: 0.9 % — SIGNIFICANT CHANGE UP (ref 0–0.9)
IMM GRANULOCYTES NFR BLD AUTO: 1 % — HIGH (ref 0–0.9)
INR BLD: 2.46 RATIO — HIGH (ref 0.85–1.16)
INR BLD: 2.57 RATIO — HIGH (ref 0.85–1.16)
INR BLD: 2.58 RATIO — HIGH (ref 0.85–1.16)
INR BLD: 2.66 RATIO — HIGH (ref 0.85–1.16)
LDH SERPL L TO P-CCNC: 621 U/L — HIGH (ref 135–225)
LYMPHOCYTES # BLD AUTO: 0.45 K/UL — LOW (ref 1–3.3)
LYMPHOCYTES # BLD AUTO: 0.57 K/UL — LOW (ref 1–3.3)
LYMPHOCYTES # BLD AUTO: 0.62 K/UL — LOW (ref 1–3.3)
LYMPHOCYTES # BLD AUTO: 0.66 K/UL — LOW (ref 1–3.3)
LYMPHOCYTES # BLD AUTO: 2.4 % — LOW (ref 13–44)
LYMPHOCYTES # BLD AUTO: 2.9 % — LOW (ref 13–44)
LYMPHOCYTES # BLD AUTO: 3.1 % — LOW (ref 13–44)
LYMPHOCYTES # BLD AUTO: 3.5 % — LOW (ref 13–44)
MACROCYTES BLD QL: SLIGHT — SIGNIFICANT CHANGE UP
MAGNESIUM SERPL-MCNC: 1.9 MG/DL — SIGNIFICANT CHANGE UP (ref 1.6–2.6)
MAGNESIUM SERPL-MCNC: 1.9 MG/DL — SIGNIFICANT CHANGE UP (ref 1.6–2.6)
MAGNESIUM SERPL-MCNC: 2 MG/DL — SIGNIFICANT CHANGE UP (ref 1.6–2.6)
MAGNESIUM SERPL-MCNC: 2 MG/DL — SIGNIFICANT CHANGE UP (ref 1.6–2.6)
MCHC RBC-ENTMCNC: 22.5 PG — LOW (ref 27–34)
MCHC RBC-ENTMCNC: 22.8 PG — LOW (ref 27–34)
MCHC RBC-ENTMCNC: 28.6 G/DL — LOW (ref 32–36)
MCHC RBC-ENTMCNC: 28.9 G/DL — LOW (ref 32–36)
MCHC RBC-ENTMCNC: 28.9 G/DL — LOW (ref 32–36)
MCHC RBC-ENTMCNC: 29 G/DL — LOW (ref 32–36)
MCV RBC AUTO: 78.8 FL — LOW (ref 80–100)
MCV RBC AUTO: 78.9 FL — LOW (ref 80–100)
MCV RBC AUTO: 78.9 FL — LOW (ref 80–100)
MCV RBC AUTO: 79 FL — LOW (ref 80–100)
MICROCYTES BLD QL: SLIGHT — SIGNIFICANT CHANGE UP
MONOCYTES # BLD AUTO: 0.97 K/UL — HIGH (ref 0–0.9)
MONOCYTES # BLD AUTO: 1.02 K/UL — HIGH (ref 0–0.9)
MONOCYTES # BLD AUTO: 1.15 K/UL — HIGH (ref 0–0.9)
MONOCYTES # BLD AUTO: 1.3 K/UL — HIGH (ref 0–0.9)
MONOCYTES NFR BLD AUTO: 4.9 % — SIGNIFICANT CHANGE UP (ref 2–14)
MONOCYTES NFR BLD AUTO: 5.5 % — SIGNIFICANT CHANGE UP (ref 2–14)
MONOCYTES NFR BLD AUTO: 6.1 % — SIGNIFICANT CHANGE UP (ref 2–14)
MONOCYTES NFR BLD AUTO: 6.5 % — SIGNIFICANT CHANGE UP (ref 2–14)
NEUTROPHILS # BLD AUTO: 16.9 K/UL — HIGH (ref 1.8–7.4)
NEUTROPHILS # BLD AUTO: 16.94 K/UL — HIGH (ref 1.8–7.4)
NEUTROPHILS # BLD AUTO: 17.85 K/UL — HIGH (ref 1.8–7.4)
NEUTROPHILS # BLD AUTO: 18.19 K/UL — HIGH (ref 1.8–7.4)
NEUTROPHILS NFR BLD AUTO: 89.5 % — HIGH (ref 43–77)
NEUTROPHILS NFR BLD AUTO: 89.5 % — HIGH (ref 43–77)
NEUTROPHILS NFR BLD AUTO: 90.9 % — HIGH (ref 43–77)
NEUTROPHILS NFR BLD AUTO: 91.1 % — HIGH (ref 43–77)
NRBC # BLD AUTO: 0 K/UL — SIGNIFICANT CHANGE UP (ref 0–0)
NRBC # BLD AUTO: 0 K/UL — SIGNIFICANT CHANGE UP (ref 0–0)
NRBC # BLD AUTO: 0.02 K/UL — HIGH (ref 0–0)
NRBC # BLD: 1 /100 WBCS — HIGH (ref 0–0)
NRBC # FLD: 0 K/UL — SIGNIFICANT CHANGE UP (ref 0–0)
NRBC # FLD: 0 K/UL — SIGNIFICANT CHANGE UP (ref 0–0)
NRBC # FLD: 0.02 K/UL — HIGH (ref 0–0)
NRBC BLD AUTO-RTO: 0 /100 WBCS — SIGNIFICANT CHANGE UP (ref 0–0)
NRBC BLD-RTO: 1 /100 WBCS — HIGH (ref 0–0)
OVALOCYTES BLD QL SMEAR: SLIGHT — SIGNIFICANT CHANGE UP
PHOSPHATE SERPL-MCNC: 3.7 MG/DL — SIGNIFICANT CHANGE UP (ref 2.5–4.5)
PHOSPHATE SERPL-MCNC: 3.8 MG/DL — SIGNIFICANT CHANGE UP (ref 2.5–4.5)
PHOSPHATE SERPL-MCNC: 4.2 MG/DL — SIGNIFICANT CHANGE UP (ref 2.5–4.5)
PHOSPHATE SERPL-MCNC: 4.3 MG/DL — SIGNIFICANT CHANGE UP (ref 2.5–4.5)
PLAT MORPH BLD: NORMAL — SIGNIFICANT CHANGE UP
PLATELET # BLD AUTO: 124 K/UL — LOW (ref 150–400)
PLATELET # BLD AUTO: 126 K/UL — LOW (ref 150–400)
PLATELET # BLD AUTO: 129 K/UL — LOW (ref 150–400)
PLATELET # BLD AUTO: 138 K/UL — LOW (ref 150–400)
PLATELET COUNT - ESTIMATE: ABNORMAL
POIKILOCYTOSIS BLD QL AUTO: SIGNIFICANT CHANGE UP
POLYCHROMASIA BLD QL SMEAR: SLIGHT — SIGNIFICANT CHANGE UP
POTASSIUM SERPL-MCNC: 4 MMOL/L — SIGNIFICANT CHANGE UP (ref 3.5–5.3)
POTASSIUM SERPL-MCNC: 4.1 MMOL/L — SIGNIFICANT CHANGE UP (ref 3.5–5.3)
POTASSIUM SERPL-MCNC: 4.2 MMOL/L — SIGNIFICANT CHANGE UP (ref 3.5–5.3)
POTASSIUM SERPL-MCNC: 4.3 MMOL/L — SIGNIFICANT CHANGE UP (ref 3.5–5.3)
POTASSIUM SERPL-SCNC: 4 MMOL/L — SIGNIFICANT CHANGE UP (ref 3.5–5.3)
POTASSIUM SERPL-SCNC: 4.1 MMOL/L — SIGNIFICANT CHANGE UP (ref 3.5–5.3)
POTASSIUM SERPL-SCNC: 4.2 MMOL/L — SIGNIFICANT CHANGE UP (ref 3.5–5.3)
POTASSIUM SERPL-SCNC: 4.3 MMOL/L — SIGNIFICANT CHANGE UP (ref 3.5–5.3)
PROT SERPL-MCNC: 6 G/DL — SIGNIFICANT CHANGE UP (ref 6–8.3)
PROT SERPL-MCNC: 6.3 G/DL — SIGNIFICANT CHANGE UP (ref 6–8.3)
PROT SERPL-MCNC: 6.5 G/DL — SIGNIFICANT CHANGE UP (ref 6–8.3)
PROT SERPL-MCNC: 6.5 G/DL — SIGNIFICANT CHANGE UP (ref 6–8.3)
PROTHROM AB SERPL-ACNC: 29 SEC — HIGH (ref 9.9–13.4)
PROTHROM AB SERPL-ACNC: 30.3 SEC — HIGH (ref 9.9–13.4)
PROTHROM AB SERPL-ACNC: 30.4 SEC — HIGH (ref 9.9–13.4)
PROTHROM AB SERPL-ACNC: 31.3 SEC — HIGH (ref 9.9–13.4)
RBC # BLD: 5.21 M/UL — SIGNIFICANT CHANGE UP (ref 4.2–5.8)
RBC # BLD: 5.22 M/UL — SIGNIFICANT CHANGE UP (ref 4.2–5.8)
RBC # BLD: 5.34 M/UL — SIGNIFICANT CHANGE UP (ref 4.2–5.8)
RBC # BLD: 5.37 M/UL — SIGNIFICANT CHANGE UP (ref 4.2–5.8)
RBC # FLD: 19.9 % — HIGH (ref 10.3–14.5)
RBC # FLD: 20.1 % — HIGH (ref 10.3–14.5)
RBC BLD AUTO: ABNORMAL
SCHISTOCYTES BLD QL AUTO: SLIGHT — SIGNIFICANT CHANGE UP
SODIUM SERPL-SCNC: 141 MMOL/L — SIGNIFICANT CHANGE UP (ref 135–145)
SODIUM SERPL-SCNC: 142 MMOL/L — SIGNIFICANT CHANGE UP (ref 135–145)
SODIUM SERPL-SCNC: 143 MMOL/L — SIGNIFICANT CHANGE UP (ref 135–145)
SODIUM SERPL-SCNC: 145 MMOL/L — SIGNIFICANT CHANGE UP (ref 135–145)
SPECIMEN SOURCE: SIGNIFICANT CHANGE UP
SPECIMEN SOURCE: SIGNIFICANT CHANGE UP
TARGETS BLD QL SMEAR: SIGNIFICANT CHANGE UP
TRIGL SERPL-MCNC: 164 MG/DL — HIGH
VARIANT LYMPHS # BLD: 0.9 % — SIGNIFICANT CHANGE UP (ref 0–6)
VARIANT LYMPHS NFR BLD MANUAL: 0.9 % — SIGNIFICANT CHANGE UP (ref 0–6)
WBC # BLD: 18.58 K/UL — HIGH (ref 3.8–10.5)
WBC # BLD: 18.93 K/UL — HIGH (ref 3.8–10.5)
WBC # BLD: 19.94 K/UL — HIGH (ref 3.8–10.5)
WBC # BLD: 19.94 K/UL — HIGH (ref 3.8–10.5)
WBC # FLD AUTO: 18.58 K/UL — HIGH (ref 3.8–10.5)
WBC # FLD AUTO: 18.93 K/UL — HIGH (ref 3.8–10.5)
WBC # FLD AUTO: 19.94 K/UL — HIGH (ref 3.8–10.5)
WBC # FLD AUTO: 19.94 K/UL — HIGH (ref 3.8–10.5)

## 2025-03-02 PROCEDURE — 33948 ECMO/ECLS DAILY MGMT-VENOUS: CPT | Mod: GC

## 2025-03-02 PROCEDURE — 99291 CRITICAL CARE FIRST HOUR: CPT | Mod: GC,25

## 2025-03-02 PROCEDURE — 76604 US EXAM CHEST: CPT | Mod: 26,GC

## 2025-03-02 PROCEDURE — 99292 CRITICAL CARE ADDL 30 MIN: CPT | Mod: GC,25

## 2025-03-02 PROCEDURE — 71045 X-RAY EXAM CHEST 1 VIEW: CPT | Mod: 26

## 2025-03-02 PROCEDURE — 93321 DOPPLER ECHO F-UP/LMTD STD: CPT | Mod: 26,GC

## 2025-03-02 PROCEDURE — 93308 TTE F-UP OR LMTD: CPT | Mod: 26,GC

## 2025-03-02 RX ORDER — NICARDIPINE HCL 30 MG
10 CAPSULE ORAL
Qty: 40 | Refills: 0 | Status: DISCONTINUED | OUTPATIENT
Start: 2025-03-02 | End: 2025-03-02

## 2025-03-02 RX ORDER — FUROSEMIDE 10 MG/ML
10 INJECTION INTRAMUSCULAR; INTRAVENOUS
Qty: 500 | Refills: 0 | Status: DISCONTINUED | OUTPATIENT
Start: 2025-03-02 | End: 2025-03-04

## 2025-03-02 RX ORDER — METHYLNALTREXONE BROMIDE 12 MG/.6ML
12 INJECTION, SOLUTION SUBCUTANEOUS ONCE
Refills: 0 | Status: COMPLETED | OUTPATIENT
Start: 2025-03-02 | End: 2025-03-02

## 2025-03-02 RX ORDER — NICARDIPINE HCL 30 MG
7.5 CAPSULE ORAL
Qty: 40 | Refills: 0 | Status: DISCONTINUED | OUTPATIENT
Start: 2025-03-02 | End: 2025-03-04

## 2025-03-02 RX ADMIN — IPRATROPIUM BROMIDE AND ALBUTEROL SULFATE 3 MILLILITER(S): .5; 2.5 SOLUTION RESPIRATORY (INHALATION) at 22:14

## 2025-03-02 RX ADMIN — Medication 50 MG/HR: at 08:00

## 2025-03-02 RX ADMIN — Medication 4 MILLILITER(S): at 14:42

## 2025-03-02 RX ADMIN — Medication 4 MILLILITER(S): at 03:48

## 2025-03-02 RX ADMIN — Medication 25 GRAM(S): at 13:25

## 2025-03-02 RX ADMIN — INSULIN LISPRO 2: 100 INJECTION, SOLUTION INTRAVENOUS; SUBCUTANEOUS at 12:14

## 2025-03-02 RX ADMIN — Medication 50 MG/HR: at 01:09

## 2025-03-02 RX ADMIN — Medication 1 APPLICATION(S): at 04:56

## 2025-03-02 RX ADMIN — Medication 15 MILLILITER(S): at 17:09

## 2025-03-02 RX ADMIN — Medication 25 GRAM(S): at 04:54

## 2025-03-02 RX ADMIN — MUPIROCIN CALCIUM 1 APPLICATION(S): 20 CREAM TOPICAL at 04:54

## 2025-03-02 RX ADMIN — PROPOFOL 36.1 MICROGRAM(S)/KG/MIN: 10 INJECTION, EMULSION INTRAVENOUS at 04:52

## 2025-03-02 RX ADMIN — Medication 1 APPLICATION(S): at 17:10

## 2025-03-02 RX ADMIN — DEXAMETHASONE 110 MILLIGRAM(S): 0.5 TABLET ORAL at 04:51

## 2025-03-02 RX ADMIN — FUROSEMIDE 7.5 MG/HR: 10 INJECTION INTRAMUSCULAR; INTRAVENOUS at 19:16

## 2025-03-02 RX ADMIN — INSULIN LISPRO 2: 100 INJECTION, SOLUTION INTRAVENOUS; SUBCUTANEOUS at 05:04

## 2025-03-02 RX ADMIN — CLOTRIMAZOLE 1 APPLICATION(S): 1 CREAM TOPICAL at 17:10

## 2025-03-02 RX ADMIN — Medication 37.5 MG/HR: at 19:16

## 2025-03-02 RX ADMIN — POLYETHYLENE GLYCOL 3350 17 GRAM(S): 17 POWDER, FOR SOLUTION ORAL at 17:10

## 2025-03-02 RX ADMIN — Medication 15 MILLILITER(S): at 04:52

## 2025-03-02 RX ADMIN — INSULIN LISPRO 2: 100 INJECTION, SOLUTION INTRAVENOUS; SUBCUTANEOUS at 23:08

## 2025-03-02 RX ADMIN — Medication 25 GRAM(S): at 21:06

## 2025-03-02 RX ADMIN — Medication 1 APPLICATION(S): at 04:53

## 2025-03-02 RX ADMIN — DEXMEDETOMIDINE HYDROCHLORIDE IN SODIUM CHLORIDE 60.2 MICROGRAM(S)/KG/HR: 4 INJECTION INTRAVENOUS at 19:16

## 2025-03-02 RX ADMIN — Medication 4 MILLILITER(S): at 22:15

## 2025-03-02 RX ADMIN — METHYLNALTREXONE BROMIDE 12 MILLIGRAM(S): 12 INJECTION, SOLUTION SUBCUTANEOUS at 10:45

## 2025-03-02 RX ADMIN — DEXMEDETOMIDINE HYDROCHLORIDE IN SODIUM CHLORIDE 60.2 MICROGRAM(S)/KG/HR: 4 INJECTION INTRAVENOUS at 08:00

## 2025-03-02 RX ADMIN — Medication 4 MILLILITER(S): at 08:21

## 2025-03-02 RX ADMIN — FUROSEMIDE 40 MILLIGRAM(S): 10 INJECTION INTRAMUSCULAR; INTRAVENOUS at 01:08

## 2025-03-02 RX ADMIN — IPRATROPIUM BROMIDE AND ALBUTEROL SULFATE 3 MILLILITER(S): .5; 2.5 SOLUTION RESPIRATORY (INHALATION) at 14:42

## 2025-03-02 RX ADMIN — PROPOFOL 36.1 MICROGRAM(S)/KG/MIN: 10 INJECTION, EMULSION INTRAVENOUS at 08:00

## 2025-03-02 RX ADMIN — Medication 1 MG/HR: at 19:16

## 2025-03-02 RX ADMIN — FUROSEMIDE 40 MILLIGRAM(S): 10 INJECTION INTRAMUSCULAR; INTRAVENOUS at 08:01

## 2025-03-02 RX ADMIN — DEXMEDETOMIDINE HYDROCHLORIDE IN SODIUM CHLORIDE 60.2 MICROGRAM(S)/KG/HR: 4 INJECTION INTRAVENOUS at 04:52

## 2025-03-02 RX ADMIN — Medication 15 MILLILITER(S): at 11:52

## 2025-03-02 RX ADMIN — Medication 10 MILLILITER(S): at 04:55

## 2025-03-02 RX ADMIN — Medication 10 MILLILITER(S): at 17:26

## 2025-03-02 RX ADMIN — QUETIAPINE FUMARATE 25 MILLIGRAM(S): 25 TABLET ORAL at 17:10

## 2025-03-02 RX ADMIN — IPRATROPIUM BROMIDE AND ALBUTEROL SULFATE 3 MILLILITER(S): .5; 2.5 SOLUTION RESPIRATORY (INHALATION) at 08:21

## 2025-03-02 RX ADMIN — QUETIAPINE FUMARATE 25 MILLIGRAM(S): 25 TABLET ORAL at 04:53

## 2025-03-02 RX ADMIN — INSULIN LISPRO 2: 100 INJECTION, SOLUTION INTRAVENOUS; SUBCUTANEOUS at 17:11

## 2025-03-02 RX ADMIN — IPRATROPIUM BROMIDE AND ALBUTEROL SULFATE 3 MILLILITER(S): .5; 2.5 SOLUTION RESPIRATORY (INHALATION) at 03:47

## 2025-03-02 RX ADMIN — Medication 1 MG/HR: at 08:01

## 2025-03-02 RX ADMIN — Medication 37.5 MG/HR: at 13:26

## 2025-03-02 RX ADMIN — Medication 40 MILLIGRAM(S): at 11:52

## 2025-03-02 RX ADMIN — FUROSEMIDE 2.5 MG/HR: 10 INJECTION INTRAMUSCULAR; INTRAVENOUS at 11:19

## 2025-03-02 RX ADMIN — PROPOFOL 36.1 MICROGRAM(S)/KG/MIN: 10 INJECTION, EMULSION INTRAVENOUS at 19:15

## 2025-03-02 RX ADMIN — POLYETHYLENE GLYCOL 3350 17 GRAM(S): 17 POWDER, FOR SOLUTION ORAL at 04:53

## 2025-03-02 RX ADMIN — CLOTRIMAZOLE 1 APPLICATION(S): 1 CREAM TOPICAL at 04:55

## 2025-03-02 RX ADMIN — MUPIROCIN CALCIUM 1 APPLICATION(S): 20 CREAM TOPICAL at 17:11

## 2025-03-02 NOTE — PROGRESS NOTE ADULT - SUBJECTIVE AND OBJECTIVE BOX
CHIEF COMPLAINT:    Interval Events:    HPI:  38 yo M w/ class III obesity, pAfib (no AC), HTN, BEA (on CPAP), history of b/l papilledema attributed to pseudotumor cerebri s/p LP in 2024 with very high opening pressure, who is transferred for VV ECMO for ARDS and severe hypoxia. Patient initially presented to [place] on 2/24 for SOB and b/l LE edema. 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 He is a current every day smoker of Newports and marijuana, but does not drink or do other drugs. Currently lives with his mother. There is a long term partner in his life, but they are not , and they have an on/off relationship currently not very involved with each other as per the patient's mother and aunt.  At [facility] where he was getting an eval last year for shortness of breath, he had a sleep study and was diagnosed with sleep apnea, prescribed a PAP machine, which he has in his room but the mother is not sure how many nights per week he uses it. Recently, the last day or two, his mother and brother have been under the weather with URIs and the patient 2 days ago started to develop a ruynny nose, ough, some wheezing of the chest, as well as worsening shortness of breath and fevers at home. He called his aunt who told him to go get checked out and then he landed at the [place] ED. Patient found to be reportedly hypoxemic to 70% on RA with worsening respiratory status/secretions and somnolence in the ED. Patient was intubated with difficulty (~6 attempts) due to very large tongue secretions. Despite optimal vent management, patient remained hypoxemic. Unable to prone due to obesity. Patient cannulated for VV ECMO on 2/25 and transferred to [facility] for further management.     [place] labs notable for MRSA PCR -, Fluvid -, urine legionella -, sputum gram stain  with GPC and GPR    CTA chest on 2/24 negative for pulmonary embolism, notable for patchy bilateral lower lobe opacities, hepatomegaly, mild ascites, edema/induration of the abdominal pannus.    LE duplex on 2/25 negative for DVT    TTE on 2/25 showed LV EF 61%, enlarged RV with TAPSE 2.1cm, ePASP at least 49 (Patient had 10/2024 TTE with normal LV and RV with ePASP 14).    Only home med is Lasix. Not on acetazolamide for pseudotumor or on Wegovy (was pending auth) (26 Feb 2025 01:48)      REVIEW OF SYSTEMS:    [ ] Unable to assess ROS because ________    OBJECTIVE:  ICU Vital Signs Last 24 Hrs  T(C): 36 (02 Mar 2025 06:00), Max: 36.2 (01 Mar 2025 23:00)  T(F): 96.8 (02 Mar 2025 06:00), Max: 97.2 (01 Mar 2025 23:00)  HR: 68 (02 Mar 2025 06:00) (55 - 83)  BP: --  BP(mean): --  ABP: 140/75 (02 Mar 2025 06:00) (123/63 - 157/88)  ABP(mean): 93 (02 Mar 2025 06:00) (81 - 110)  RR: 14 (02 Mar 2025 06:00) (0 - 20)  SpO2: 97% (02 Mar 2025 06:00) (96% - 100%)    O2 Parameters below as of 02 Mar 2025 06:00  Patient On (Oxygen Delivery Method): ventilator    O2 Concentration (%): 40      Mode: AC/ CMV (Assist Control/ Continuous Mandatory Ventilation), RR (machine): 14, FiO2: 40, PEEP: 24, ITime: 1, MAP: 28, PC: 12, PIP: 37    02-28 @ 07:01  -  03-01 @ 07:00  --------------------------------------------------------  IN: 5992 mL / OUT: 6900 mL / NET: -908 mL    03-01 @ 07:01  -  03-02 @ 06:19  --------------------------------------------------------  IN: 4675.7 mL / OUT: 6765 mL / NET: -2089.3 mL      CAPILLARY BLOOD GLUCOSE      POCT Blood Glucose.: 156 mg/dL (02 Mar 2025 05:03)    Physical Exam:   Constitutional: ill appearing, no acute distress   HEENT: + PERRLA, EOMI, no drainage or redness  Neck: supple,  No JVD, Right IJ ecmo cannula in place   Respiratory: Ventilator assisted breath Sounds diminished bilaterally to auscultation, no accessory muscle use noted  Cardiovascular: Regular rate, regular rhythm, normal S1, S2; no murmurs or rub  Gastrointestinal: Obese, soft, non distended, no hepatosplenomegaly, normal bowel sounds  Extremities: + 2 peripheral edema, no cyanosis, no clubbing   Vascular: Equal and normal pulses: 2+ peripheral pulses throughout  Neurological: sedated/intubated  Musculoskeletal: No joint swelling or deformity; no limitation of movement  Skin: warm, dry, well perfused, cellulitis to pannus area, DTI to right buttocks     HOSPITAL MEDICATIONS:  Standing Meds:  albuterol/ipratropium for Nebulization 3 milliLiter(s) Nebulizer every 6 hours  argatroban Infusion 1.094 MICROgram(s)/kG/Min IV Continuous <Continuous>  chlorhexidine 0.12% Liquid 15 milliLiter(s) Oral Mucosa every 12 hours  chlorhexidine 2% Cloths 1 Application(s) Topical <User Schedule>  clotrimazole 1% Cream 1 Application(s) Topical two times a day  dexMEDEtomidine Infusion 1 MICROgram(s)/kG/Hr IV Continuous <Continuous>  dextrose 50% Injectable 25 Gram(s) IV Push once  dextrose 50% Injectable 12.5 Gram(s) IV Push once  dextrose 50% Injectable 25 Gram(s) IV Push once  furosemide   Injectable 40 milliGRAM(s) IV Push every 8 hours  glucagon  Injectable 1 milliGRAM(s) IntraMuscular once  HYDROmorphone Infusion. 1 mG/Hr IV Continuous <Continuous>  insulin lispro (ADMELOG) corrective regimen sliding scale   SubCutaneous every 6 hours  multivitamin/minerals/iron Oral Solution (CENTRUM) 15 milliLiter(s) Oral daily  mupirocin 2% Ointment 1 Application(s) Topical two times a day  niCARdipine Infusion 10 mG/Hr IV Continuous <Continuous>  pantoprazole  Injectable 40 milliGRAM(s) IV Push daily  petrolatum Ophthalmic Ointment 1 Application(s) Both EYES every 12 hours  piperacillin/tazobactam IVPB.. 4.5 Gram(s) IV Intermittent every 8 hours  polyethylene glycol 3350 17 Gram(s) Oral two times a day  propofol Infusion 25 MICROgram(s)/kG/Min IV Continuous <Continuous>  QUEtiapine 25 milliGRAM(s) Oral every 12 hours  senna Syrup 10 milliLiter(s) Oral two times a day  sodium chloride 3%  Inhalation 4 milliLiter(s) Inhalation every 6 hours      PRN Meds:      LABS:                        12.1   19.94 )-----------( 129      ( 02 Mar 2025 03:40 )             42.3     Hgb Trend: 12.1<--, 12.2<--, 12.5<--, 12.4<--, 12.0<--  03-02    141  |  103  |  33[H]  ----------------------------<  197[H]  4.1   |  27  |  0.79    Ca    8.3[L]      02 Mar 2025 03:40  Phos  3.7     03-02  Mg     1.90     03-02    TPro  6.0  /  Alb  2.5[L]  /  TBili  1.2  /  DBili  x   /  AST  63[H]  /  ALT  67[H]  /  AlkPhos  57  03-02    Creatinine Trend: 0.79<--, 0.79<--, 0.80<--, 0.79<--, 0.81<--, 0.86<--  PT/INR - ( 02 Mar 2025 03:40 )   PT: 29.0 sec;   INR: 2.46 ratio         PTT - ( 02 Mar 2025 03:40 )  PTT:62.9 sec  Urinalysis Basic - ( 02 Mar 2025 03:40 )    Color: x / Appearance: x / SG: x / pH: x  Gluc: 197 mg/dL / Ketone: x  / Bili: x / Urobili: x   Blood: x / Protein: x / Nitrite: x   Leuk Esterase: x / RBC: x / WBC x   Sq Epi: x / Non Sq Epi: x / Bacteria: x      Arterial Blood Gas:  03-02 @ 03:40  7.40/52/91/32/97.7/6.1  ABG lactate: --  Arterial Blood Gas:  03-01 @ 21:30  7.39/53/119/32/98.9/5.8  ABG lactate: --  Arterial Blood Gas:  03-01 @ 17:46  7.38/54/80/32/96.4/5.4  ABG lactate: --  Arterial Blood Gas:  03-01 @ 15:55  7.36/56/94/32/97.6/4.7  ABG lactate: --  Arterial Blood Gas:  03-01 @ 12:53  7.36/55/84/31/96.3/4.3  ABG lactate: --  Arterial Blood Gas:  03-01 @ 03:30  7.36/56/89/32/97.3/4.8  ABG lactate: --  Arterial Blood Gas:  02-28 @ 21:30  7.37/52/97/30/97.5/3.7  ABG lactate: --  Arterial Blood Gas:  02-28 @ 16:35  7.35/54/83/30/95.8/3.0  ABG lactate: --  Arterial Blood Gas:  02-28 @ 13:10  7.36/49/69/28/91.8/1.5  ABG lactate: --  Arterial Blood Gas:  02-28 @ 11:15  7.39/51/70/31/94.2/4.8  ABG lactate: --        MICROBIOLOGY:     Culture - Acid Fast - Bronchial w/Smear (collected 01 Mar 2025 12:45)  Source: Bronchial Bronchial Lavage    Culture - Bronchial (collected 01 Mar 2025 12:45)  Source: Bronchial Bronchial Lavage  Gram Stain (01 Mar 2025 21:53):    Few polymorphonuclear leukocytes per low power field    No Squamous epithelial cells per low power field    No organisms seen per oil power field           Interval Events:   -no acute events reported overnight     HPI:  40 yo M w/ class III obesity, pAfib (no AC), HTN, BEA (on CPAP), history of b/l papilledema attributed to pseudotumor cerebri s/p LP in 2024 with very high opening pressure, who is transferred for VV ECMO for ARDS and severe hypoxia. Patient initially presented to [place] on 2/24 for SOB and b/l LE edema. The patient's family endorses that he has had progressive leg swelling shortness of breath, dyspnea, and deconditioning for the past several months and had to take disability from his job at the [employer]. He is a current every day smoker of Newports and marijuana, but does not drink or do other drugs. Currently lives with his mother. There is a long term partner in his life, but they are not , and they have an on/off relationship currently not very involved with each other as per the patient's mother and aunt.  At [facility] where he was getting an eval last year for shortness of breath, he had a sleep study and was diagnosed with sleep apnea, prescribed a PAP machine, which he has in his room but the mother is not sure how many nights per week he uses it. Recently, the last day or two, his mother and brother have been under the weather with URIs and the patient 2 days ago started to develop a ruynny nose, ough, some wheezing of the chest, as well as worsening shortness of breath and fevers at home. He called his aunt who told him to go get checked out and then he landed at the [place] ED. Patient found to be reportedly hypoxemic to 70% on RA with worsening respiratory status/secretions and somnolence in the ED. Patient was intubated with difficulty (~6 attempts) due to very large tongue secretions. Despite optimal vent management, patient remained hypoxemic. Unable to prone due to obesity. Patient cannulated for VV ECMO on 2/25 and transferred to [facility] for further management.     [place] labs notable for MRSA PCR -, Fluvid -, urine legionella -, sputum gram stain  with GPC and GPR    CTA chest on 2/24 negative for pulmonary embolism, notable for patchy bilateral lower lobe opacities, hepatomegaly, mild ascites, edema/induration of the abdominal pannus.    LE duplex on 2/25 negative for DVT    TTE on 2/25 showed LV EF 61%, enlarged RV with TAPSE 2.1cm, ePASP at least 49 (Patient had 10/2024 TTE with normal LV and RV with ePASP 14).    Only home med is Lasix. Not on acetazolamide for pseudotumor or on Wegovy (was pending auth) (26 Feb 2025 01:48)      REVIEW OF SYSTEMS:    [x ] Unable to assess ROS because patient is intubated/sedated     OBJECTIVE:  ICU Vital Signs Last 24 Hrs  T(C): 36 (02 Mar 2025 06:00), Max: 36.2 (01 Mar 2025 23:00)  T(F): 96.8 (02 Mar 2025 06:00), Max: 97.2 (01 Mar 2025 23:00)  HR: 68 (02 Mar 2025 06:00) (55 - 83)  BP: --  BP(mean): --  ABP: 140/75 (02 Mar 2025 06:00) (123/63 - 157/88)  ABP(mean): 93 (02 Mar 2025 06:00) (81 - 110)  RR: 14 (02 Mar 2025 06:00) (0 - 20)  SpO2: 97% (02 Mar 2025 06:00) (96% - 100%)    O2 Parameters below as of 02 Mar 2025 06:00  Patient On (Oxygen Delivery Method): ventilator    O2 Concentration (%): 40      Mode: AC/ CMV (Assist Control/ Continuous Mandatory Ventilation), RR (machine): 14, FiO2: 40, PEEP: 24, ITime: 1, MAP: 28, PC: 12, PIP: 37    02-28 @ 07:01 - 03-01 @ 07:00  --------------------------------------------------------  IN: 5992 mL / OUT: 6900 mL / NET: -908 mL    03-01 @ 07:01  -  03-02 @ 06:19  --------------------------------------------------------  IN: 4675.7 mL / OUT: 6765 mL / NET: -2089.3 mL      CAPILLARY BLOOD GLUCOSE      POCT Blood Glucose.: 156 mg/dL (02 Mar 2025 05:03)    Physical Exam:   Constitutional: ill appearing, no acute distress   HEENT: + PERRLA, EOMI, no drainage or redness  Neck: supple,  No JVD, Right IJ ecmo cannula in place   Respiratory: Ventilator assisted breath Sounds diminished bilaterally to auscultation, no accessory muscle use noted  Cardiovascular: Regular rate, regular rhythm, normal S1, S2; no murmurs or rub  Gastrointestinal: Obese, soft, non distended, no hepatosplenomegaly, normal bowel sounds  Extremities: + 2 peripheral edema, no cyanosis, no clubbing   Vascular: Equal and normal pulses: 2+ peripheral pulses throughout  Neurological: sedated/intubated  Musculoskeletal: No joint swelling or deformity; no limitation of movement  Skin: warm, dry, well perfused, cellulitis to pannus area, DTI to right buttocks     HOSPITAL MEDICATIONS:  Standing Meds:  albuterol/ipratropium for Nebulization 3 milliLiter(s) Nebulizer every 6 hours  argatroban Infusion 1.094 MICROgram(s)/kG/Min IV Continuous <Continuous>  chlorhexidine 0.12% Liquid 15 milliLiter(s) Oral Mucosa every 12 hours  chlorhexidine 2% Cloths 1 Application(s) Topical <User Schedule>  clotrimazole 1% Cream 1 Application(s) Topical two times a day  dexMEDEtomidine Infusion 1 MICROgram(s)/kG/Hr IV Continuous <Continuous>  dextrose 50% Injectable 25 Gram(s) IV Push once  dextrose 50% Injectable 12.5 Gram(s) IV Push once  dextrose 50% Injectable 25 Gram(s) IV Push once  furosemide   Injectable 40 milliGRAM(s) IV Push every 8 hours  glucagon  Injectable 1 milliGRAM(s) IntraMuscular once  HYDROmorphone Infusion. 1 mG/Hr IV Continuous <Continuous>  insulin lispro (ADMELOG) corrective regimen sliding scale   SubCutaneous every 6 hours  multivitamin/minerals/iron Oral Solution (CENTRUM) 15 milliLiter(s) Oral daily  mupirocin 2% Ointment 1 Application(s) Topical two times a day  niCARdipine Infusion 10 mG/Hr IV Continuous <Continuous>  pantoprazole  Injectable 40 milliGRAM(s) IV Push daily  petrolatum Ophthalmic Ointment 1 Application(s) Both EYES every 12 hours  piperacillin/tazobactam IVPB.. 4.5 Gram(s) IV Intermittent every 8 hours  polyethylene glycol 3350 17 Gram(s) Oral two times a day  propofol Infusion 25 MICROgram(s)/kG/Min IV Continuous <Continuous>  QUEtiapine 25 milliGRAM(s) Oral every 12 hours  senna Syrup 10 milliLiter(s) Oral two times a day  sodium chloride 3%  Inhalation 4 milliLiter(s) Inhalation every 6 hours      PRN Meds:      LABS:                        12.1   19.94 )-----------( 129      ( 02 Mar 2025 03:40 )             42.3     Hgb Trend: 12.1<--, 12.2<--, 12.5<--, 12.4<--, 12.0<--  03-02    141  |  103  |  33[H]  ----------------------------<  197[H]  4.1   |  27  |  0.79    Ca    8.3[L]      02 Mar 2025 03:40  Phos  3.7     03-02  Mg     1.90     03-02    TPro  6.0  /  Alb  2.5[L]  /  TBili  1.2  /  DBili  x   /  AST  63[H]  /  ALT  67[H]  /  AlkPhos  57  03-02    Creatinine Trend: 0.79<--, 0.79<--, 0.80<--, 0.79<--, 0.81<--, 0.86<--  PT/INR - ( 02 Mar 2025 03:40 )   PT: 29.0 sec;   INR: 2.46 ratio         PTT - ( 02 Mar 2025 03:40 )  PTT:62.9 sec  Urinalysis Basic - ( 02 Mar 2025 03:40 )    Color: x / Appearance: x / SG: x / pH: x  Gluc: 197 mg/dL / Ketone: x  / Bili: x / Urobili: x   Blood: x / Protein: x / Nitrite: x   Leuk Esterase: x / RBC: x / WBC x   Sq Epi: x / Non Sq Epi: x / Bacteria: x      Arterial Blood Gas:  03-02 @ 03:40  7.40/52/91/32/97.7/6.1  ABG lactate: --  Arterial Blood Gas:  03-01 @ 21:30  7.39/53/119/32/98.9/5.8  ABG lactate: --  Arterial Blood Gas:  03-01 @ 17:46  7.38/54/80/32/96.4/5.4  ABG lactate: --  Arterial Blood Gas:  03-01 @ 15:55  7.36/56/94/32/97.6/4.7  ABG lactate: --  Arterial Blood Gas:  03-01 @ 12:53  7.36/55/84/31/96.3/4.3  ABG lactate: --  Arterial Blood Gas:  03-01 @ 03:30  7.36/56/89/32/97.3/4.8  ABG lactate: --  Arterial Blood Gas:  02-28 @ 21:30  7.37/52/97/30/97.5/3.7  ABG lactate: --  Arterial Blood Gas:  02-28 @ 16:35  7.35/54/83/30/95.8/3.0  ABG lactate: --  Arterial Blood Gas:  02-28 @ 13:10  7.36/49/69/28/91.8/1.5  ABG lactate: --  Arterial Blood Gas:  02-28 @ 11:15  7.39/51/70/31/94.2/4.8  ABG lactate: --        MICROBIOLOGY:     Culture - Acid Fast - Bronchial w/Smear (collected 01 Mar 2025 12:45)  Source: Bronchial Bronchial Lavage    Culture - Bronchial (collected 01 Mar 2025 12:45)  Source: Bronchial Bronchial Lavage  Gram Stain (01 Mar 2025 21:53):    Few polymorphonuclear leukocytes per low power field    No Squamous epithelial cells per low power field    No organisms seen per oil power field

## 2025-03-02 NOTE — CHART NOTE - NSCHARTNOTEFT_GEN_A_CORE
: MD Jonnathan    INDICATION: acute hypoxemic respiratory failure     PROCEDURE:  [ x] LIMITED ECHO  [x ] LIMITED CHEST  [ ] LIMITED RETROPERITONEAL  [ ] LIMITED ABDOMINAL  [ ] LIMITED DVT  [ ] NEEDLE GUIDANCE VASCULAR  [ ] NEEDLE GUIDANCE THORACENTESIS  [ ] NEEDLE GUIDANCE PARACENTESIS  [ ] NEEDLE GUIDANCE PERICARDIOCENTESIS  [ ] OTHER    FINDINGS:  RV enlarged, LV still larger though there is interventricular septal bounce.  LVOT VTI 10-14 cm by PW doppler.  IVC 3.1 cm at its largest.  B lines bilaterally.  Small to moderate R base pleural effusion.    INTERPRETATION:  ARDS +/- fluid overload    Images stored on Qpath.    Attending Attestation:  I was present during the key portions of the procedure and immediately available during the entire procedure.  Selene Bertrand MD  Attending  Pulmonary & Critical Care Medicine.

## 2025-03-02 NOTE — PROGRESS NOTE ADULT - CRITICAL CARE ATTENDING COMMENT
39 year old male with Class 3 obesity, pAF, HTN, BEA, and history of papilledema attributed to pseudotumor cerebri transferred here from [facility] 2/24/25 for VV-ECMO. He presented with acute hypoxemic respiratory failure and septic shock due to suspected multifocal pneumonia. There is a component of volume overload and right ventricular dysfunction as well. He was intubated 2/25/25 for development of severe ARDS.    NEURO: Sedated with propofol and hydromorphone, on precedex, will c/w seroquel. Cisatracurium stopped 2/27.  Wean propofol today    CVS: Nicardipine off for hypertension. Continue diuresis today as he remains hypervolemic.  ROBERT showed RV thickening but not bigger than LV.  Able to increase PEEP to 24 without RV dilation.  Continue with cardene.  Switch lasix 40 mg IV tid to lasix 5 mg/hr for now    PULM: VVECMO Support: FdO2 1, Sweep down to 2, Flow 4.5. Ventilator settings: Pressure AC Pinsp 12 PEEP 24 Rate 14, FiO2 40. TV approximately 500 cc.  ABG as above. Will leave ECMO settings as is for now, continue with diuresis to keep net negative.    GI: NPO. Continue enteral feeds. Follow up with nutrition to optimize. Bowel regimen - more relistor today    RENAL: Acute kidney injury. Creatinine improved. Plan to diurese with lasix drip to keep net negative    ENDO: SSI    ID: Empiric zosyn, stopped vanco.  f/u BAL. May stop zosyn today.    HEME: Transfused 1 unit overnight 2/26 to optimize oxygenation. No evidence of bleeding.  HIT panel for plt.    PPX: Argatroban while on ECMO.    Full Code    Prognosis guarded.    ECMO settings reviewed, including Pre and Post membrane ABGs, transmembrane pressures, flow, oxygenator function, and cannula positions. Discussed with team, including nursing and perfusion.  Total time spent on VV ECMO management was 15 minutes, separate from time spent on critical care management, procedures, and teaching.

## 2025-03-02 NOTE — PROGRESS NOTE ADULT - ASSESSMENT
40 yo M w/ class III obesity, pAfib (no AC), HTN, BEA (on CPAP), history of b/l papilledema attributed to pseudotumor cerebri, who is transferred from [place] on 2/26 for VV ECMO 2/2 ARDS. Patient initially presented to [place] on 2/24 for SOB and b/l LE edema. As per chart review, patient endorses some degree of SOB and b/l LE edema x 3 months with significant weight gain. As per ICU team, patient had positive sick contacts with viral URI 1 to 2 weeks PTA and since then has had worsening SOB. Patient found to be reportedly hypoxemic to 70% on RA with worsening respiratory status/secretions and somnolence in the ED. Patient was a difficult intubation (~6 attempts). Despite optimal vent management, patient remained hypoxemic. Unable to prone due to obesity. Patient cannulated for VV ECMO on 2/25 and transferred to [facility] for further management.     Neuro  #Mental status   #pseudotumor cerebri  - history of pseudotumor cerebri s/p LP under fluoro in 2020 with high opening pressure with MRI 2020 also showed possible pituitary mass but unclear because of pressure effect from pseudotumor cerebri causing partially empty sella. Considering to start acetazolamide but never started  - prolactin high (30), defer checking cortisol axis given already received steroids but should be investigated, ACTH (<1.5) low but should be suppressed iso steroids  - concern for pituitary adenoma, however TSH .79 normal,  fT4 0.9 normal, and low T3 67  - sedated with Dilaudid,  propofol, and precedex gtt. Goal to wean off propofol today  - start seroquel 25mg BID to assist with sedation wean, monitor QTc  - paralytics d/c'd 2/27   - PT/OT following     Cardiovascular  #HTN   #Atrial fibrillation   - Hx of a. fib not on AC   - echo from 2024 did not demonstrate elevation of pulmonary pressures, but now with severe PH suggesting chronicity on TTE at    - TTE on 2/25 showed LV EF 61%, enlarged RV with TAPSE 2.1cm, ePASP at least 49 (Patient had 10/2024 TTE with normal LV and RV with ePASP 14).  - ROBERT 2/26 showing VTI 17  - ROBERT 3/1 showing mild TR, enlarged RV with normal LV/RV function. PEEP increased to 24 without signs of RV dilation  - troponins initially elevated, peaked at 162 however downtrended   - s/p esmolol drip for high flows now D/C'd may need to consider again if need for higher flows  - cardene gtt started for hypertension [id number], will continue for now   - ECG daily to monitor QTc     Pulmonary  #Pulmonary HTN  #ARDS  #Multifocal Pneumonia   #BEA   - history of diagnosed BEA/?OHS intermittent compliance to home PAP  - c/f acute on suspected chronic pulmonary hypertension in setting of possible BEA/OHS c/b pneumonia, ARDS, and pulmonary edema   - CTA chest on 2/24 negative for pulmonary embolism, notable for patchy bilateral lower lobe opacities, hepatomegaly, mild ascites, edema/induration of the abdominal pannus.  - Full RVP and BAL cultures negative    - Bronchoscopy demonstrated copious purulent secretions 2/26 and 2/27  - Bronchoscopy 3/1 noted with small amount of old, dried blood at R and L mainstem, not occluding airway.  Follow up BAL and cell ct  - Dexamethasone 20mg x 5 days, then 10 mg and taper as per clinical status pneumonia/ARDS  - C/w duoNeb q6 hours + 3% saline and IPV   - c/w PC/AC PIP:30  RR 14 PC: 12 PEEP:24 Fio2 40% ~280-320  - difficult glottic visualization due to large tongue upon intubation at [facility] 2/25    #ECMO  VV ECMO		  Cannulation sites/dates:  Flow: 5		       P1: 208		Delta P: 41  RPM: 3650	       P2: 167		SVO2: 75  Sweep: 3.5L/min:		            FIO2: 1      ECMO Calculated CO:   DO2/VO2:   VO2/DO2:     - requiring high flows for oxygenation causing high delta P but D/V adequate, no lactate and SVO2 WNL but will watch closely   - Clinically perfusing. Lactate: 1.9  - ECMO sweep sighing q1hr   - Adjust circuit flow as tolerated with DO2:VO2 goal >2  - Monitor for hemolysis, chatter, evidence of recirculation, mixing      Gastrointestinal  #Diet   #Transaminitis  - tolerating tube feeds  - c/w bowel regimen   - elevated Tbili likely iso hemolysis 2/2 high ECMO flows, mild transaminitis now improving  - hepatomegaly and mild ascites noted on CT A/P - no pocket to tap  - RUQ ultrasound with steatosis    - c/w bowel regimen with Senna and Miralax. S/p Relistor x 2 doses, Naloxegol held  - triglycerides 176  - nutrition following       Gu/Renal  #ELLA  - ELLA - improving, likely ATN/vascular congestion  - good UO, overall net negative -3.6L/LOS  - s/p intermittent diuresis, start standing lasix 40mg TID with goal to keep net negative    Infectious disease  #leukocytosis  #Pneumonia   #cellulitis   - Leukocytosis now likely iso steroids, remains afebrile  - s/p cefepime, linezolid, and aztihro 2/25-2/26   - discontinue vanco (2/26-3/1) and c/w zosyn (2/26-  ) empirically, should cover cellulitis as well  - full RVP negative  - BCx x 2 2/24 currently NGTD, UCx 2/25 NGTD, tracheal aspirate Cx from 2/25 with normal commensal kyle   - strep pneumo Ag and urine legionella negative   - Mupiricon b/l nares for +MSSA x 5 days (2/26-  )  - follow up BAL cultures 3/1, previous BAL 2/26 NGTD  - ? penile meatus yellow discharge pending results of GC/chamydia, HIV negative   - candidal intertrigo +/- cellulitis of the pannus - clotrimazole cream 1% BID 2/26 -+ abx as per above    Hematology/DVT ppx  #Anticoagulation   #Thrombocytopenia  #Anemia   - Hb stable 12.4, platelets downtrending likely r/t shearing 2/2 ECMO circuit  - s/p 1 u PRBC 2/26 for O2 delivery  - c/w argatroban with goal 50-90  - LE duplex 2/25 neg    Endocrine  #DM  -A1c 6.7   - obesity with metabolic syndrome  - diabetes with steroid induced hypoglycemia  - c/w decadron and taper iso ARDS  - pituitary workup, steroids as above given recent weight gain but likely volume though  - c/w FS Q6h and moderate ISS, may need to adjust as tapering steroids    SKin/Lines  #Access  #Cellulitis   #DTI  - L subclavian TLC inserted 2/25   - L radial artery line 2/25  - RIJ ECMO cannula 2/26 at 20 cm  - R fem ECMO cannula 2/26 at 25 cm  - candidal intertrigo +/- cellulitis of the pannus - clotrimazole cream 1% BID 2/26 -+ abx as per above  - as per nursing staff, patient with ? DTI to right buttocks   - wound consult placed     GOC   - full code   - palliative involved  and following while on ECMO   - Mother involved in care and life partner    38 yo M w/ class III obesity, pAfib (no AC), HTN, BEA (on CPAP), history of b/l papilledema attributed to pseudotumor cerebri, who is transferred from [place] on 2/26 for VV ECMO 2/2 ARDS. Patient initially presented to [place] on 2/24 for SOB and b/l LE edema. As per chart review, patient endorses some degree of SOB and b/l LE edema x 3 months with significant weight gain. As per ICU team, patient had positive sick contacts with viral URI 1 to 2 weeks PTA and since then has had worsening SOB. Patient found to be reportedly hypoxemic to 70% on RA with worsening respiratory status/secretions and somnolence in the ED. Patient was a difficult intubation (~6 attempts). Despite optimal vent management, patient remained hypoxemic. Unable to prone due to obesity. Patient cannulated for VV ECMO on 2/25 and transferred to [facility] for further management.     Neuro  #Mental status   #pseudotumor cerebri  - history of pseudotumor cerebri s/p LP under fluoro in 2020 with high opening pressure with MRI 2020 also showed possible pituitary mass but unclear because of pressure effect from pseudotumor cerebri causing partially empty sella. Considering to start acetazolamide but never started  - prolactin high (30), defer checking cortisol axis given already received steroids but should be investigated, ACTH (<1.5) low but should be suppressed iso steroids  - concern for pituitary adenoma, however TSH .79 normal,  fT4 0.9 normal, and low T3 67  - sedated with Dilaudid, propofol, and precedex gtt. Goal to wean off propofol today  - c/w seroquel 25mg BID to assist with sedation wean, monitor QTc  - paralytics d/c'd 2/27   - PT/OT following     Cardiovascular  #HTN   #Atrial fibrillation   - Hx of a. fib not on AC   - echo from 2024 did not demonstrate elevation of pulmonary pressures, but now with severe PH suggesting chronicity on TTE at    - TTE on 2/25 showed LV EF 61%, enlarged RV with TAPSE 2.1cm, ePASP at least 49 (Patient had 10/2024 TTE with normal LV and RV with ePASP 14).  - ROBERT 2/26 showing VTI 17  - ROBERT 3/1 showing mild TR, enlarged RV with normal LV/RV function. PEEP increased to 24 without signs of RV dilation  - troponins initially elevated, peaked at 162 however downtrended   - s/p esmolol drip for high flows now D/C'd may need to consider again if need for higher flows  - cardene gtt started for hypertension [id number], will continue for now   - ECG daily to monitor QTc currently 490    Pulmonary  #Pulmonary HTN  #ARDS  #Multifocal Pneumonia   #BEA   - history of diagnosed BEA/?OHS intermittent compliance to home PAP  - c/f acute on suspected chronic pulmonary hypertension in setting of possible BEA/OHS c/b pneumonia, ARDS, and pulmonary edema   - CTA chest on 2/24 negative for pulmonary embolism, notable for patchy bilateral lower lobe opacities, hepatomegaly, mild ascites, edema/induration of the abdominal pannus.  - Full RVP and BAL cultures negative    - Bronchoscopy demonstrated copious purulent secretions 2/26 and 2/27  - Bronchoscopy 3/1 noted with small amount of old, dried blood at R and L mainstem, not occluding airway.  Follow up BAL and cell ct  - Dexamethasone 20mg x 5 days, then 10 mg and taper as per clinical status pneumonia/ARDS  - C/w duoNeb q6 hours + 3% saline and IPV   - c/w PC/AC PIP:30  RR 14 PC: 12 PEEP:24 Fio2 40% ~300-400  - difficult glottic visualization due to large tongue upon intubation at [facility] 2/25    #ECMO  VV ECMO		  Cannulation sites/dates:  Flow: 4.5	       P1: 188		Delta P: 35  RPM: 3300	       P2: 153		SVO2: 70  Sweep: 2L/min:		            FIO2: 0.6      ECMO Calculated CO: 6.17  DO2/VO2: 5.26  VO2/DO2: 0.18    - requiring high flows for oxygenation causing high delta P but D/V adequate, no lactate and SVO2 WNL but will watch closely   - Clinically perfusing. Lactate: 1.8  - ECMO sweep sighing q1hr   - Adjust circuit flow as tolerated with DO2:VO2 goal >2  - Monitor for hemolysis, chatter, evidence of recirculation, mixing      Gastrointestinal  #Diet   #Transaminitis  - tolerating tube feeds  - c/w bowel regimen   - elevated Tbili likely iso hemolysis 2/2 high ECMO flows, mild transaminitis now improving  - hepatomegaly and mild ascites noted on CT A/P - no pocket to tap  - RUQ ultrasound with steatosis    - c/w bowel regimen with Senna and Miralax. S/p Relistor x 3 doses, Naloxegol held  - triglycerides 164  - nutrition following       Gu/Renal  #ELLA  - ELLA - improving, likely ATN/vascular congestion  - good UO, overall net negative -3.6L/LOS  - s/p intermittent diuresis, started standing lasix 40mg TID with goal to keep net negative will change to lasix gtt 3/2 to 5mg/h    Infectious disease  #leukocytosis  #Pneumonia   #cellulitis   - Leukocytosis now likely iso steroids, remains afebrile  - s/p cefepime, linezolid, and aztihro 2/25-2/26   - s/p vanco (2/26-3/1) and c/w zosyn (2/26-  ) empirically, should cover cellulitis as well  - full RVP negative  - BCx x 2 2/24 and 2/26 currently NGTD, UCx 2/25 NGTD, tracheal aspirate Cx from 2/25 with normal commensal kyle   - strep pneumo Ag and urine legionella negative   - Mupiricon b/l nares for +MSSA x 5 days (2/26-  )  - follow up BAL cultures 3/1, previous BAL 2/26 NGTD  - ? penile meatus yellow discharge, GC/chamydia and HIV negative   - candidal intertrigo +/- cellulitis of the pannus - clotrimazole cream 1% BID 2/26 -+ abx as per above    Hematology/DVT ppx  #Anticoagulation   #Thrombocytopenia  #Anemia   - Hb stable 12.4, platelets downtrending likely r/t shearing 2/2 ECMO circuit  - s/p 1 u PRBC 2/26 for O2 delivery  - c/w argatroban with goal 50-70  - LE duplex 2/25 neg    Endocrine  #DM  -A1c 6.7   - obesity with metabolic syndrome  - diabetes with steroid induced hypoglycemia  - c/w decadron and taper iso ARDS  - pituitary workup, steroids as above given recent weight gain but likely volume though  - c/w FS Q6h and moderate ISS, may need to adjust as tapering steroids    SKin/Lines  #Access  #Cellulitis   #DTI  - L subclavian TLC inserted 2/25   - L radial artery line 2/25  - RIJ ECMO cannula 2/26 at 20 cm  - R fem ECMO cannula 2/26 at 25 cm  - candidal intertrigo +/- cellulitis of the pannus - clotrimazole cream 1% BID 2/26 -+ abx as per above  - as per nursing staff, patient with ? DTI to right buttocks   - wound consult placed     GOC   - full code   - palliative involved  and following while on ECMO   - Mother involved in care and life partner

## 2025-03-03 LAB
ALBUMIN SERPL ELPH-MCNC: 2.6 G/DL — LOW (ref 3.3–5)
ALBUMIN SERPL ELPH-MCNC: 2.6 G/DL — LOW (ref 3.3–5)
ALBUMIN SERPL ELPH-MCNC: 2.7 G/DL — LOW (ref 3.3–5)
ALBUMIN SERPL ELPH-MCNC: 2.7 G/DL — LOW (ref 3.3–5)
ALP SERPL-CCNC: 56 U/L — SIGNIFICANT CHANGE UP (ref 40–120)
ALP SERPL-CCNC: 57 U/L — SIGNIFICANT CHANGE UP (ref 40–120)
ALP SERPL-CCNC: 58 U/L — SIGNIFICANT CHANGE UP (ref 40–120)
ALP SERPL-CCNC: 58 U/L — SIGNIFICANT CHANGE UP (ref 40–120)
ALT FLD-CCNC: 114 U/L — HIGH (ref 4–41)
ALT FLD-CCNC: 128 U/L — HIGH (ref 4–41)
ALT FLD-CCNC: 80 U/L — HIGH (ref 4–41)
ALT FLD-CCNC: 98 U/L — HIGH (ref 4–41)
ANION GAP SERPL CALC-SCNC: 11 MMOL/L — SIGNIFICANT CHANGE UP (ref 7–14)
ANION GAP SERPL CALC-SCNC: 8 MMOL/L — SIGNIFICANT CHANGE UP (ref 7–14)
ANION GAP SERPL CALC-SCNC: 9 MMOL/L — SIGNIFICANT CHANGE UP (ref 7–14)
ANION GAP SERPL CALC-SCNC: 9 MMOL/L — SIGNIFICANT CHANGE UP (ref 7–14)
APTT BLD: 61 SEC — HIGH (ref 24.5–35.6)
APTT BLD: 61.1 SEC — HIGH (ref 24.5–35.6)
APTT BLD: 61.3 SEC — HIGH (ref 24.5–35.6)
APTT BLD: 61.9 SEC — HIGH (ref 24.5–35.6)
AST SERPL-CCNC: 66 U/L — HIGH (ref 4–40)
AST SERPL-CCNC: 84 U/L — HIGH (ref 4–40)
AST SERPL-CCNC: 91 U/L — HIGH (ref 4–40)
AST SERPL-CCNC: 97 U/L — HIGH (ref 4–40)
BASOPHILS # BLD AUTO: 0.02 K/UL — SIGNIFICANT CHANGE UP (ref 0–0.2)
BASOPHILS NFR BLD AUTO: 0.1 % — SIGNIFICANT CHANGE UP (ref 0–2)
BILIRUB SERPL-MCNC: 1 MG/DL — SIGNIFICANT CHANGE UP (ref 0.2–1.2)
BILIRUB SERPL-MCNC: 1.1 MG/DL — SIGNIFICANT CHANGE UP (ref 0.2–1.2)
BILIRUB SERPL-MCNC: 1.1 MG/DL — SIGNIFICANT CHANGE UP (ref 0.2–1.2)
BILIRUB SERPL-MCNC: 1.3 MG/DL — HIGH (ref 0.2–1.2)
BLOOD GAS ARTERIAL - LYTES,HGB,ICA,LACT RESULT: SIGNIFICANT CHANGE UP
BLOOD GAS ARTERIAL - LYTES,HGB,ICA,LACT RESULT: SIGNIFICANT CHANGE UP
BLOOD GAS ARTERIAL COMPREHENSIVE RESULT: SIGNIFICANT CHANGE UP
BLOOD GAS ECMO POST MEMBRANE - ARTERIAL RESULT: SIGNIFICANT CHANGE UP
BLOOD GAS ECMO PRE MEMBRANE - VENOUS RESULT: SIGNIFICANT CHANGE UP
BUN SERPL-MCNC: 40 MG/DL — HIGH (ref 7–23)
BUN SERPL-MCNC: 40 MG/DL — HIGH (ref 7–23)
BUN SERPL-MCNC: 42 MG/DL — HIGH (ref 7–23)
BUN SERPL-MCNC: 42 MG/DL — HIGH (ref 7–23)
CA-I BLD-SCNC: 1.1 MMOL/L — LOW (ref 1.15–1.29)
CA-I BLD-SCNC: 1.12 MMOL/L — LOW (ref 1.15–1.29)
CALCIUM SERPL-MCNC: 8.1 MG/DL — LOW (ref 8.4–10.5)
CALCIUM SERPL-MCNC: 8.1 MG/DL — LOW (ref 8.4–10.5)
CALCIUM SERPL-MCNC: 8.2 MG/DL — LOW (ref 8.4–10.5)
CALCIUM SERPL-MCNC: 8.3 MG/DL — LOW (ref 8.4–10.5)
CHLORIDE SERPL-SCNC: 106 MMOL/L — SIGNIFICANT CHANGE UP (ref 98–107)
CHLORIDE SERPL-SCNC: 106 MMOL/L — SIGNIFICANT CHANGE UP (ref 98–107)
CHLORIDE SERPL-SCNC: 107 MMOL/L — SIGNIFICANT CHANGE UP (ref 98–107)
CHLORIDE SERPL-SCNC: 108 MMOL/L — HIGH (ref 98–107)
CO2 SERPL-SCNC: 29 MMOL/L — SIGNIFICANT CHANGE UP (ref 22–31)
CO2 SERPL-SCNC: 30 MMOL/L — SIGNIFICANT CHANGE UP (ref 22–31)
CO2 SERPL-SCNC: 30 MMOL/L — SIGNIFICANT CHANGE UP (ref 22–31)
CO2 SERPL-SCNC: 34 MMOL/L — HIGH (ref 22–31)
CREAT SERPL-MCNC: 0.84 MG/DL — SIGNIFICANT CHANGE UP (ref 0.5–1.3)
CREAT SERPL-MCNC: 0.84 MG/DL — SIGNIFICANT CHANGE UP (ref 0.5–1.3)
CREAT SERPL-MCNC: 0.86 MG/DL — SIGNIFICANT CHANGE UP (ref 0.5–1.3)
CREAT SERPL-MCNC: 0.87 MG/DL — SIGNIFICANT CHANGE UP (ref 0.5–1.3)
CULTURE RESULTS: SIGNIFICANT CHANGE UP
EGFR: 114 ML/MIN/1.73M2 — SIGNIFICANT CHANGE UP
EGFR: 115 ML/MIN/1.73M2 — SIGNIFICANT CHANGE UP
EOSINOPHIL # BLD AUTO: 0 K/UL — SIGNIFICANT CHANGE UP (ref 0–0.5)
EOSINOPHIL # BLD AUTO: 0 K/UL — SIGNIFICANT CHANGE UP (ref 0–0.5)
EOSINOPHIL # BLD AUTO: 0.01 K/UL — SIGNIFICANT CHANGE UP (ref 0–0.5)
EOSINOPHIL # BLD AUTO: 0.01 K/UL — SIGNIFICANT CHANGE UP (ref 0–0.5)
EOSINOPHIL NFR BLD AUTO: 0 % — SIGNIFICANT CHANGE UP (ref 0–6)
EOSINOPHIL NFR BLD AUTO: 0 % — SIGNIFICANT CHANGE UP (ref 0–6)
EOSINOPHIL NFR BLD AUTO: 0.1 % — SIGNIFICANT CHANGE UP (ref 0–6)
EOSINOPHIL NFR BLD AUTO: 0.1 % — SIGNIFICANT CHANGE UP (ref 0–6)
GLUCOSE BLDC GLUCOMTR-MCNC: 156 MG/DL — HIGH (ref 70–99)
GLUCOSE BLDC GLUCOMTR-MCNC: 166 MG/DL — HIGH (ref 70–99)
GLUCOSE BLDC GLUCOMTR-MCNC: 184 MG/DL — HIGH (ref 70–99)
GLUCOSE BLDC GLUCOMTR-MCNC: 225 MG/DL — HIGH (ref 70–99)
GLUCOSE SERPL-MCNC: 187 MG/DL — HIGH (ref 70–99)
GLUCOSE SERPL-MCNC: 199 MG/DL — HIGH (ref 70–99)
GLUCOSE SERPL-MCNC: 203 MG/DL — HIGH (ref 70–99)
GLUCOSE SERPL-MCNC: 216 MG/DL — HIGH (ref 70–99)
HAPTOGLOB SERPL-MCNC: <20 MG/DL — LOW (ref 34–200)
HAPTOGLOB SERPL-MCNC: <20 MG/DL — LOW (ref 34–200)
HCT VFR BLD CALC: 42.2 % — SIGNIFICANT CHANGE UP (ref 39–50)
HCT VFR BLD CALC: 42.9 % — SIGNIFICANT CHANGE UP (ref 39–50)
HCT VFR BLD CALC: 43.1 % — SIGNIFICANT CHANGE UP (ref 39–50)
HCT VFR BLD CALC: 43.5 % — SIGNIFICANT CHANGE UP (ref 39–50)
HGB BLD-MCNC: 11.7 G/DL — LOW (ref 13–17)
HGB BLD-MCNC: 11.9 G/DL — LOW (ref 13–17)
HGB BLD-MCNC: 12 G/DL — LOW (ref 13–17)
HGB BLD-MCNC: 12.2 G/DL — LOW (ref 13–17)
IANC: 15.98 K/UL — HIGH (ref 1.8–7.4)
IANC: 16.58 K/UL — HIGH (ref 1.8–7.4)
IANC: 16.88 K/UL — HIGH (ref 1.8–7.4)
IANC: 17.13 K/UL — HIGH (ref 1.8–7.4)
IMM GRANULOCYTES NFR BLD AUTO: 0.6 % — SIGNIFICANT CHANGE UP (ref 0–0.9)
IMM GRANULOCYTES NFR BLD AUTO: 0.7 % — SIGNIFICANT CHANGE UP (ref 0–0.9)
IMM GRANULOCYTES NFR BLD AUTO: 0.9 % — SIGNIFICANT CHANGE UP (ref 0–0.9)
IMM GRANULOCYTES NFR BLD AUTO: 1 % — HIGH (ref 0–0.9)
INR BLD: 2.52 RATIO — HIGH (ref 0.85–1.16)
INR BLD: 2.61 RATIO — HIGH (ref 0.85–1.16)
INR BLD: 2.7 RATIO — HIGH (ref 0.85–1.16)
INR BLD: 2.85 RATIO — HIGH (ref 0.85–1.16)
LDH SERPL L TO P-CCNC: 624 U/L — HIGH (ref 135–225)
LYMPHOCYTES # BLD AUTO: 0.61 K/UL — LOW (ref 1–3.3)
LYMPHOCYTES # BLD AUTO: 0.64 K/UL — LOW (ref 1–3.3)
LYMPHOCYTES # BLD AUTO: 0.68 K/UL — LOW (ref 1–3.3)
LYMPHOCYTES # BLD AUTO: 0.87 K/UL — LOW (ref 1–3.3)
LYMPHOCYTES # BLD AUTO: 3.3 % — LOW (ref 13–44)
LYMPHOCYTES # BLD AUTO: 3.4 % — LOW (ref 13–44)
LYMPHOCYTES # BLD AUTO: 3.5 % — LOW (ref 13–44)
LYMPHOCYTES # BLD AUTO: 4.7 % — LOW (ref 13–44)
MAGNESIUM SERPL-MCNC: 1.9 MG/DL — SIGNIFICANT CHANGE UP (ref 1.6–2.6)
MAGNESIUM SERPL-MCNC: 2.1 MG/DL — SIGNIFICANT CHANGE UP (ref 1.6–2.6)
MAGNESIUM SERPL-MCNC: 2.2 MG/DL — SIGNIFICANT CHANGE UP (ref 1.6–2.6)
MAGNESIUM SERPL-MCNC: 2.2 MG/DL — SIGNIFICANT CHANGE UP (ref 1.6–2.6)
MCHC RBC-ENTMCNC: 22 PG — LOW (ref 27–34)
MCHC RBC-ENTMCNC: 22.6 PG — LOW (ref 27–34)
MCHC RBC-ENTMCNC: 22.7 PG — LOW (ref 27–34)
MCHC RBC-ENTMCNC: 22.9 PG — LOW (ref 27–34)
MCHC RBC-ENTMCNC: 26.9 G/DL — LOW (ref 32–36)
MCHC RBC-ENTMCNC: 27.7 G/DL — LOW (ref 32–36)
MCHC RBC-ENTMCNC: 28.3 G/DL — LOW (ref 32–36)
MCHC RBC-ENTMCNC: 28.4 G/DL — LOW (ref 32–36)
MCV RBC AUTO: 80 FL — SIGNIFICANT CHANGE UP (ref 80–100)
MCV RBC AUTO: 80.7 FL — SIGNIFICANT CHANGE UP (ref 80–100)
MCV RBC AUTO: 81.7 FL — SIGNIFICANT CHANGE UP (ref 80–100)
MCV RBC AUTO: 81.9 FL — SIGNIFICANT CHANGE UP (ref 80–100)
MONOCYTES # BLD AUTO: 1.21 K/UL — HIGH (ref 0–0.9)
MONOCYTES # BLD AUTO: 1.26 K/UL — HIGH (ref 0–0.9)
MONOCYTES # BLD AUTO: 1.47 K/UL — HIGH (ref 0–0.9)
MONOCYTES # BLD AUTO: 1.54 K/UL — HIGH (ref 0–0.9)
MONOCYTES NFR BLD AUTO: 6.5 % — SIGNIFICANT CHANGE UP (ref 2–14)
MONOCYTES NFR BLD AUTO: 6.6 % — SIGNIFICANT CHANGE UP (ref 2–14)
MONOCYTES NFR BLD AUTO: 7.9 % — SIGNIFICANT CHANGE UP (ref 2–14)
MONOCYTES NFR BLD AUTO: 8 % — SIGNIFICANT CHANGE UP (ref 2–14)
NEUTROPHILS # BLD AUTO: 15.98 K/UL — HIGH (ref 1.8–7.4)
NEUTROPHILS # BLD AUTO: 16.58 K/UL — HIGH (ref 1.8–7.4)
NEUTROPHILS # BLD AUTO: 16.88 K/UL — HIGH (ref 1.8–7.4)
NEUTROPHILS # BLD AUTO: 17.13 K/UL — HIGH (ref 1.8–7.4)
NEUTROPHILS NFR BLD AUTO: 86.5 % — HIGH (ref 43–77)
NEUTROPHILS NFR BLD AUTO: 87.5 % — HIGH (ref 43–77)
NEUTROPHILS NFR BLD AUTO: 89.1 % — HIGH (ref 43–77)
NEUTROPHILS NFR BLD AUTO: 89.2 % — HIGH (ref 43–77)
NRBC # BLD AUTO: 0 K/UL — SIGNIFICANT CHANGE UP (ref 0–0)
NRBC # BLD AUTO: 0 K/UL — SIGNIFICANT CHANGE UP (ref 0–0)
NRBC # BLD AUTO: 0.02 K/UL — HIGH (ref 0–0)
NRBC # BLD AUTO: 0.02 K/UL — HIGH (ref 0–0)
NRBC # FLD: 0 K/UL — SIGNIFICANT CHANGE UP (ref 0–0)
NRBC # FLD: 0 K/UL — SIGNIFICANT CHANGE UP (ref 0–0)
NRBC # FLD: 0.02 K/UL — HIGH (ref 0–0)
NRBC # FLD: 0.02 K/UL — HIGH (ref 0–0)
NRBC BLD AUTO-RTO: 0 /100 WBCS — SIGNIFICANT CHANGE UP (ref 0–0)
PHOSPHATE SERPL-MCNC: 3.8 MG/DL — SIGNIFICANT CHANGE UP (ref 2.5–4.5)
PHOSPHATE SERPL-MCNC: 3.9 MG/DL — SIGNIFICANT CHANGE UP (ref 2.5–4.5)
PHOSPHATE SERPL-MCNC: 4.1 MG/DL — SIGNIFICANT CHANGE UP (ref 2.5–4.5)
PHOSPHATE SERPL-MCNC: 4.7 MG/DL — HIGH (ref 2.5–4.5)
PLATELET # BLD AUTO: 126 K/UL — LOW (ref 150–400)
PLATELET # BLD AUTO: 127 K/UL — LOW (ref 150–400)
PLATELET # BLD AUTO: 131 K/UL — LOW (ref 150–400)
PLATELET # BLD AUTO: 141 K/UL — LOW (ref 150–400)
POTASSIUM SERPL-MCNC: 3.7 MMOL/L — SIGNIFICANT CHANGE UP (ref 3.5–5.3)
POTASSIUM SERPL-MCNC: 3.9 MMOL/L — SIGNIFICANT CHANGE UP (ref 3.5–5.3)
POTASSIUM SERPL-MCNC: 3.9 MMOL/L — SIGNIFICANT CHANGE UP (ref 3.5–5.3)
POTASSIUM SERPL-MCNC: 4 MMOL/L — SIGNIFICANT CHANGE UP (ref 3.5–5.3)
POTASSIUM SERPL-SCNC: 3.7 MMOL/L — SIGNIFICANT CHANGE UP (ref 3.5–5.3)
POTASSIUM SERPL-SCNC: 3.9 MMOL/L — SIGNIFICANT CHANGE UP (ref 3.5–5.3)
POTASSIUM SERPL-SCNC: 3.9 MMOL/L — SIGNIFICANT CHANGE UP (ref 3.5–5.3)
POTASSIUM SERPL-SCNC: 4 MMOL/L — SIGNIFICANT CHANGE UP (ref 3.5–5.3)
PROT SERPL-MCNC: 6.3 G/DL — SIGNIFICANT CHANGE UP (ref 6–8.3)
PROT SERPL-MCNC: 6.4 G/DL — SIGNIFICANT CHANGE UP (ref 6–8.3)
PROT SERPL-MCNC: 6.4 G/DL — SIGNIFICANT CHANGE UP (ref 6–8.3)
PROT SERPL-MCNC: 6.6 G/DL — SIGNIFICANT CHANGE UP (ref 6–8.3)
PROTHROM AB SERPL-ACNC: 29 SEC — HIGH (ref 9.9–13.4)
PROTHROM AB SERPL-ACNC: 30 SEC — HIGH (ref 9.9–13.4)
PROTHROM AB SERPL-ACNC: 31 SEC — HIGH (ref 9.9–13.4)
PROTHROM AB SERPL-ACNC: 32.7 SEC — HIGH (ref 9.9–13.4)
RBC # BLD: 5.23 M/UL — SIGNIFICANT CHANGE UP (ref 4.2–5.8)
RBC # BLD: 5.25 M/UL — SIGNIFICANT CHANGE UP (ref 4.2–5.8)
RBC # BLD: 5.31 M/UL — SIGNIFICANT CHANGE UP (ref 4.2–5.8)
RBC # BLD: 5.39 M/UL — SIGNIFICANT CHANGE UP (ref 4.2–5.8)
RBC # FLD: 20.4 % — HIGH (ref 10.3–14.5)
RBC # FLD: 20.4 % — HIGH (ref 10.3–14.5)
RBC # FLD: 20.7 % — HIGH (ref 10.3–14.5)
RBC # FLD: 20.8 % — HIGH (ref 10.3–14.5)
SODIUM SERPL-SCNC: 146 MMOL/L — HIGH (ref 135–145)
SODIUM SERPL-SCNC: 146 MMOL/L — HIGH (ref 135–145)
SODIUM SERPL-SCNC: 147 MMOL/L — HIGH (ref 135–145)
SODIUM SERPL-SCNC: 148 MMOL/L — HIGH (ref 135–145)
SPECIMEN SOURCE: SIGNIFICANT CHANGE UP
TRIGL SERPL-MCNC: 146 MG/DL — SIGNIFICANT CHANGE UP
WBC # BLD: 18.46 K/UL — HIGH (ref 3.8–10.5)
WBC # BLD: 18.59 K/UL — HIGH (ref 3.8–10.5)
WBC # BLD: 18.95 K/UL — HIGH (ref 3.8–10.5)
WBC # BLD: 19.56 K/UL — HIGH (ref 3.8–10.5)
WBC # FLD AUTO: 18.46 K/UL — HIGH (ref 3.8–10.5)
WBC # FLD AUTO: 18.59 K/UL — HIGH (ref 3.8–10.5)
WBC # FLD AUTO: 18.95 K/UL — HIGH (ref 3.8–10.5)
WBC # FLD AUTO: 19.56 K/UL — HIGH (ref 3.8–10.5)

## 2025-03-03 PROCEDURE — 99291 CRITICAL CARE FIRST HOUR: CPT | Mod: 25

## 2025-03-03 PROCEDURE — 71045 X-RAY EXAM CHEST 1 VIEW: CPT | Mod: 26

## 2025-03-03 PROCEDURE — 33948 ECMO/ECLS DAILY MGMT-VENOUS: CPT

## 2025-03-03 PROCEDURE — 99292 CRITICAL CARE ADDL 30 MIN: CPT

## 2025-03-03 RX ORDER — MAGNESIUM CITRATE
296 SOLUTION, ORAL ORAL ONCE
Refills: 0 | Status: COMPLETED | OUTPATIENT
Start: 2025-03-03 | End: 2025-03-03

## 2025-03-03 RX ORDER — MIDAZOLAM IN 0.9 % SOD.CHLORID 1 MG/ML
4 PLASTIC BAG, INJECTION (ML) INTRAVENOUS ONCE
Refills: 0 | Status: DISCONTINUED | OUTPATIENT
Start: 2025-03-03 | End: 2025-03-03

## 2025-03-03 RX ORDER — MIDAZOLAM IN 0.9 % SOD.CHLORID 1 MG/ML
4 PLASTIC BAG, INJECTION (ML) INTRAVENOUS ONCE
Refills: 0 | Status: DISCONTINUED | OUTPATIENT
Start: 2025-03-03 | End: 2025-03-04

## 2025-03-03 RX ORDER — METHYLNALTREXONE BROMIDE 12 MG/.6ML
18 INJECTION, SOLUTION SUBCUTANEOUS ONCE
Refills: 0 | Status: COMPLETED | OUTPATIENT
Start: 2025-03-03 | End: 2025-03-03

## 2025-03-03 RX ORDER — CISATRACURIUM BESYLATE 10 MG/5ML
20 INJECTION, SOLUTION INTRAVENOUS ONCE
Refills: 0 | Status: COMPLETED | OUTPATIENT
Start: 2025-03-03 | End: 2025-03-03

## 2025-03-03 RX ORDER — MAGNESIUM SULFATE 500 MG/ML
1 SYRINGE (ML) INJECTION ONCE
Refills: 0 | Status: COMPLETED | OUTPATIENT
Start: 2025-03-03 | End: 2025-03-03

## 2025-03-03 RX ORDER — QUETIAPINE FUMARATE 25 MG/1
12.5 TABLET ORAL
Refills: 0 | Status: DISCONTINUED | OUTPATIENT
Start: 2025-03-03 | End: 2025-03-04

## 2025-03-03 RX ORDER — PIPERACILLIN-TAZO-DEXTROSE,ISO 2.25G/50ML
4.5 IV SOLUTION, PIGGYBACK PREMIX FROZEN(ML) INTRAVENOUS EVERY 8 HOURS
Refills: 0 | Status: DISCONTINUED | OUTPATIENT
Start: 2025-03-03 | End: 2025-03-06

## 2025-03-03 RX ADMIN — Medication 1 APPLICATION(S): at 17:17

## 2025-03-03 RX ADMIN — Medication 4 MILLILITER(S): at 15:28

## 2025-03-03 RX ADMIN — INSULIN LISPRO 2: 100 INJECTION, SOLUTION INTRAVENOUS; SUBCUTANEOUS at 17:15

## 2025-03-03 RX ADMIN — Medication 15 MILLILITER(S): at 17:17

## 2025-03-03 RX ADMIN — Medication 40 MILLIEQUIVALENT(S): at 13:19

## 2025-03-03 RX ADMIN — Medication 15 MILLILITER(S): at 13:18

## 2025-03-03 RX ADMIN — FUROSEMIDE 5 MG/HR: 10 INJECTION INTRAMUSCULAR; INTRAVENOUS at 08:14

## 2025-03-03 RX ADMIN — CLOTRIMAZOLE 1 APPLICATION(S): 1 CREAM TOPICAL at 17:18

## 2025-03-03 RX ADMIN — Medication 4 MILLILITER(S): at 21:50

## 2025-03-03 RX ADMIN — Medication 1 MG/HR: at 19:31

## 2025-03-03 RX ADMIN — Medication 15 MILLILITER(S): at 05:07

## 2025-03-03 RX ADMIN — IPRATROPIUM BROMIDE AND ALBUTEROL SULFATE 3 MILLILITER(S): .5; 2.5 SOLUTION RESPIRATORY (INHALATION) at 08:56

## 2025-03-03 RX ADMIN — PROPOFOL 36.1 MICROGRAM(S)/KG/MIN: 10 INJECTION, EMULSION INTRAVENOUS at 05:10

## 2025-03-03 RX ADMIN — ARGATROBAN 15.8 MICROGRAM(S)/KG/MIN: 100 INJECTION, SOLUTION INTRAVENOUS at 05:10

## 2025-03-03 RX ADMIN — FUROSEMIDE 5 MG/HR: 10 INJECTION INTRAMUSCULAR; INTRAVENOUS at 19:30

## 2025-03-03 RX ADMIN — IPRATROPIUM BROMIDE AND ALBUTEROL SULFATE 3 MILLILITER(S): .5; 2.5 SOLUTION RESPIRATORY (INHALATION) at 15:28

## 2025-03-03 RX ADMIN — DEXMEDETOMIDINE HYDROCHLORIDE IN SODIUM CHLORIDE 60.2 MICROGRAM(S)/KG/HR: 4 INJECTION INTRAVENOUS at 08:14

## 2025-03-03 RX ADMIN — Medication 10 MILLILITER(S): at 05:07

## 2025-03-03 RX ADMIN — Medication 1 MG/HR: at 05:10

## 2025-03-03 RX ADMIN — POLYETHYLENE GLYCOL 3350 17 GRAM(S): 17 POWDER, FOR SOLUTION ORAL at 05:07

## 2025-03-03 RX ADMIN — INSULIN LISPRO 4: 100 INJECTION, SOLUTION INTRAVENOUS; SUBCUTANEOUS at 05:05

## 2025-03-03 RX ADMIN — MUPIROCIN CALCIUM 1 APPLICATION(S): 20 CREAM TOPICAL at 17:17

## 2025-03-03 RX ADMIN — IPRATROPIUM BROMIDE AND ALBUTEROL SULFATE 3 MILLILITER(S): .5; 2.5 SOLUTION RESPIRATORY (INHALATION) at 21:35

## 2025-03-03 RX ADMIN — QUETIAPINE FUMARATE 12.5 MILLIGRAM(S): 25 TABLET ORAL at 17:18

## 2025-03-03 RX ADMIN — ARGATROBAN 15.8 MICROGRAM(S)/KG/MIN: 100 INJECTION, SOLUTION INTRAVENOUS at 08:15

## 2025-03-03 RX ADMIN — METHYLNALTREXONE BROMIDE 18 MILLIGRAM(S): 12 INJECTION, SOLUTION SUBCUTANEOUS at 12:22

## 2025-03-03 RX ADMIN — Medication 100 GRAM(S): at 05:45

## 2025-03-03 RX ADMIN — Medication 25 GRAM(S): at 13:18

## 2025-03-03 RX ADMIN — Medication 1 APPLICATION(S): at 05:08

## 2025-03-03 RX ADMIN — Medication 25 GRAM(S): at 21:01

## 2025-03-03 RX ADMIN — PROPOFOL 36.1 MICROGRAM(S)/KG/MIN: 10 INJECTION, EMULSION INTRAVENOUS at 00:46

## 2025-03-03 RX ADMIN — DEXMEDETOMIDINE HYDROCHLORIDE IN SODIUM CHLORIDE 60.2 MICROGRAM(S)/KG/HR: 4 INJECTION INTRAVENOUS at 05:10

## 2025-03-03 RX ADMIN — Medication 296 MILLILITER(S): at 13:19

## 2025-03-03 RX ADMIN — POLYETHYLENE GLYCOL 3350 17 GRAM(S): 17 POWDER, FOR SOLUTION ORAL at 17:15

## 2025-03-03 RX ADMIN — Medication 40 MILLIGRAM(S): at 12:21

## 2025-03-03 RX ADMIN — CISATRACURIUM BESYLATE 20 MILLIGRAM(S): 10 INJECTION, SOLUTION INTRAVENOUS at 11:11

## 2025-03-03 RX ADMIN — Medication 1 MG/HR: at 21:00

## 2025-03-03 RX ADMIN — Medication 4 MILLIGRAM(S): at 08:00

## 2025-03-03 RX ADMIN — IPRATROPIUM BROMIDE AND ALBUTEROL SULFATE 3 MILLILITER(S): .5; 2.5 SOLUTION RESPIRATORY (INHALATION) at 03:03

## 2025-03-03 RX ADMIN — Medication 37.5 MG/HR: at 08:14

## 2025-03-03 RX ADMIN — Medication 25 GRAM(S): at 05:09

## 2025-03-03 RX ADMIN — INSULIN LISPRO 2: 100 INJECTION, SOLUTION INTRAVENOUS; SUBCUTANEOUS at 23:03

## 2025-03-03 RX ADMIN — DEXMEDETOMIDINE HYDROCHLORIDE IN SODIUM CHLORIDE 60.2 MICROGRAM(S)/KG/HR: 4 INJECTION INTRAVENOUS at 19:30

## 2025-03-03 RX ADMIN — MUPIROCIN CALCIUM 1 APPLICATION(S): 20 CREAM TOPICAL at 05:09

## 2025-03-03 RX ADMIN — Medication 4 MILLIGRAM(S): at 10:59

## 2025-03-03 RX ADMIN — Medication 4 MILLILITER(S): at 03:04

## 2025-03-03 RX ADMIN — Medication 1 MG/HR: at 08:14

## 2025-03-03 RX ADMIN — Medication 10 MILLILITER(S): at 17:14

## 2025-03-03 RX ADMIN — CLOTRIMAZOLE 1 APPLICATION(S): 1 CREAM TOPICAL at 05:09

## 2025-03-03 RX ADMIN — INSULIN LISPRO 2: 100 INJECTION, SOLUTION INTRAVENOUS; SUBCUTANEOUS at 12:20

## 2025-03-03 RX ADMIN — PROPOFOL 36.1 MICROGRAM(S)/KG/MIN: 10 INJECTION, EMULSION INTRAVENOUS at 19:30

## 2025-03-03 RX ADMIN — ARGATROBAN 15.8 MICROGRAM(S)/KG/MIN: 100 INJECTION, SOLUTION INTRAVENOUS at 19:30

## 2025-03-03 RX ADMIN — FUROSEMIDE 5 MG/HR: 10 INJECTION INTRAMUSCULAR; INTRAVENOUS at 21:00

## 2025-03-03 RX ADMIN — Medication 4 MILLILITER(S): at 08:58

## 2025-03-03 RX ADMIN — PROPOFOL 36.1 MICROGRAM(S)/KG/MIN: 10 INJECTION, EMULSION INTRAVENOUS at 08:15

## 2025-03-03 NOTE — PROGRESS NOTE ADULT - CRITICAL CARE ATTENDING COMMENT
Pt seen and examined. 39 year old M with medical hx as noted above now on VV ECMO support 2/2 ARDS in the setting of multifocal bacterial pneumonia, decompensated RV failure, ELLA 2/2 pre-renal ATN, transaminitis and lactic acidosis. Titrate down sedation as tolerated. Seroquel on hold, recheck QTc today. Remains on pressure control ventilation with FiO2 of 40 %, requiring high levels of PEEP. ETT noted to have migrated this am, repositioned under bronchoscopic visualization. FUP CXR. ECMO circuit FiO2 titrated down to 80 %, sweep at 1.5. Remains on Zosyn IV for multifocal PNA and cellulitis of abdominal wall. Cxs including BAL Cxs so far unrevealing.  Continues to have thick secretions, cont IPV to mobilize secretions. Off pressor support, required a Cardene gtt for hypertension, now titrated off, cont close monitoring of hemodynamics. Remains anasarcic, -3.4 L over past 24 hours but remains + 29L for LOS, cont diuresis with a Lasix gtt, follow electrolytes Q6H. Cont AC with argatroban gtt, Hb stable at 12, mild thrombocytopenia to 131, cont to monitor hemolysis panel. Ongoing abdominal distension without BMs, abdominal x-ray with non obstructive pattern. Cont trial of Relistor and bowel regimen. Overall prognosis extremely guarded. Remains critically ill requiring multiple bedside visits and changes in therapy. Full code. Mom and family updated in detail at bedside.     Total time spent on VV ECMO management was 25 minutes, separate from time spent on critical care management, procedures, and teaching.

## 2025-03-03 NOTE — PROGRESS NOTE ADULT - ASSESSMENT
40 yo M w/ class III obesity, pAfib (no AC), HTN, BEA (on CPAP), history of b/l papilledema attributed to pseudotumor cerebri, who is transferred from [place] on 2/26 for VV ECMO 2/2 ARDS. Patient initially presented to [place] on 2/24 for SOB and b/l LE edema. As per chart review, patient endorses some degree of SOB and b/l LE edema x 3 months with significant weight gain. As per ICU team, patient had positive sick contacts with viral URI 1 to 2 weeks PTA and since then has had worsening SOB. Patient found to be reportedly hypoxemic to 70% on RA with worsening respiratory status/secretions and somnolence in the ED. Patient was a difficult intubation (~6 attempts). Despite optimal vent management, patient remained hypoxemic. Unable to prone due to obesity. Patient cannulated for VV ECMO on 2/25 and transferred to [facility] for further management.     Neuro  #Mental status   #pseudotumor cerebri  - history of pseudotumor cerebri s/p LP under fluoro in 2020 with high opening pressure with MRI 2020 also showed possible pituitary mass but unclear because of pressure effect from pseudotumor cerebri causing partially empty sella. Considering to start acetazolamide but never started  - prolactin high (30), defer checking cortisol axis given already received steroids but should be investigated, ACTH (<1.5) low but should be suppressed iso steroids  - concern for pituitary adenoma, however TSH .79 normal,  fT4 0.9 normal, and low T3 67  - sedated with Dilaudid, propofol, and precedex gtt. Goal to wean off propofol today  - c/w seroquel 25mg BID to assist with sedation wean, monitor QTc  - paralytics d/c'd 2/27   - PT/OT following     Cardiovascular  #HTN   #Atrial fibrillation   - Hx of a. fib not on AC   - echo from 2024 did not demonstrate elevation of pulmonary pressures, but now with severe PH suggesting chronicity on TTE at    - TTE on 2/25 showed LV EF 61%, enlarged RV with TAPSE 2.1cm, ePASP at least 49 (Patient had 10/2024 TTE with normal LV and RV with ePASP 14).  - ROBERT 2/26 showing VTI 17  - ROBERT 3/1 showing mild TR, enlarged RV with normal LV/RV function. PEEP increased to 24 without signs of RV dilation  - troponins initially elevated, peaked at 162 however downtrended   - s/p esmolol drip for high flows now D/C'd may need to consider again if need for higher flows  - cardene gtt started for hypertension [id number], will continue for now   - ECG daily to monitor QTc currently 490    Pulmonary  #Pulmonary HTN  #ARDS  #Multifocal Pneumonia   #BEA   - history of diagnosed BEA/?OHS intermittent compliance to home PAP  - c/f acute on suspected chronic pulmonary hypertension in setting of possible BEA/OHS c/b pneumonia, ARDS, and pulmonary edema   - CTA chest on 2/24 negative for pulmonary embolism, notable for patchy bilateral lower lobe opacities, hepatomegaly, mild ascites, edema/induration of the abdominal pannus.  - Full RVP and BAL cultures negative    - Bronchoscopy demonstrated copious purulent secretions 2/26 and 2/27  - Bronchoscopy 3/1 noted with small amount of old, dried blood at R and L mainstem, not occluding airway.  Follow up BAL and cell ct  - Dexamethasone 20mg x 5 days, then 10 mg and taper as per clinical status pneumonia/ARDS  - C/w duoNeb q6 hours + 3% saline and IPV   - c/w PC/AC PIP:30  RR 14 PC: 12 PEEP:24 Fio2 40% ~300-400  - difficult glottic visualization due to large tongue upon intubation at [facility] 2/25    #ECMO  VV ECMO		  Cannulation sites/dates:  Flow: 	       P1: 		Delta P:  RPM: 	       P2: 		SVO2:   Sweep: L/min:		            FIO2:       ECMO Calculated CO:   DO2/VO2:   VO2/DO2:     - requiring high flows for oxygenation causing high delta P but D/V adequate, no lactate and SVO2 WNL but will watch closely   - Clinically perfusing. Lactate: 1.8  - ECMO sweep sighing q1hr   - Adjust circuit flow as tolerated with DO2:VO2 goal >2  - Monitor for hemolysis, chatter, evidence of recirculation, mixing      Gastrointestinal  #Diet   #Transaminitis  - tolerating tube feeds  - c/w bowel regimen   - elevated Tbili likely iso hemolysis 2/2 high ECMO flows, mild transaminitis now improving  - hepatomegaly and mild ascites noted on CT A/P - no pocket to tap  - RUQ ultrasound with steatosis    - c/w bowel regimen with Senna and Miralax. S/p Relistor x 3 doses, Naloxegol held  - triglycerides 164  - nutrition following       Gu/Renal  #ELLA  - ELLA - improving, likely ATN/vascular congestion  - good UO, overall net negative -3.6L/LOS  - s/p intermittent diuresis, started standing lasix 40mg TID with goal to keep net negative will change to lasix gtt 3/2 to 5mg/h    Infectious disease  #leukocytosis  #Pneumonia   #cellulitis   - Leukocytosis now likely iso steroids, remains afebrile  - s/p cefepime, linezolid, and aztihro 2/25-2/26   - s/p vanco (2/26-3/1) and c/w zosyn (2/26-  ) empirically, should cover cellulitis as well  - full RVP negative  - BCx x 2 2/24 and 2/26 currently NGTD, UCx 2/25 NGTD, tracheal aspirate Cx from 2/25 with normal commensal kyle   - strep pneumo Ag and urine legionella negative   - Mupiricon b/l nares for +MSSA x 5 days (2/26-  )  - follow up BAL cultures 3/1, previous BAL 2/26 NGTD  - ? penile meatus yellow discharge, GC/chamydia and HIV negative   - candidal intertrigo +/- cellulitis of the pannus - clotrimazole cream 1% BID 2/26 -+ abx as per above    Hematology/DVT ppx  #Anticoagulation   #Thrombocytopenia  #Anemia   - Hb stable 12.4, platelets downtrending likely r/t shearing 2/2 ECMO circuit  - s/p 1 u PRBC 2/26 for O2 delivery  - c/w argatroban with goal 50-70  - LE duplex 2/25 neg    Endocrine  #DM  -A1c 6.7   - obesity with metabolic syndrome  - diabetes with steroid induced hypoglycemia  - c/w decadron and taper iso ARDS  - pituitary workup, steroids as above given recent weight gain but likely volume though  - c/w FS Q6h and moderate ISS, may need to adjust as tapering steroids    SKin/Lines  #Access  #Cellulitis   #DTI  - L subclavian TLC inserted 2/25   - L radial artery line 2/25  - RIJ ECMO cannula 2/26 at 20 cm  - R fem ECMO cannula 2/26 at 25 cm  - candidal intertrigo +/- cellulitis of the pannus - clotrimazole cream 1% BID 2/26 -+ abx as per above  - as per nursing staff, patient with ? DTI to right buttocks   - wound consult placed     GOC   - full code   - palliative involved  and following while on ECMO   - Mother involved in care and life partner    40 yo M w/ class III obesity, pAfib (no AC), HTN, BEA (on CPAP), history of b/l papilledema attributed to pseudotumor cerebri, who is transferred from [place] on 2/26 for VV ECMO 2/2 ARDS. Patient initially presented to [place] on 2/24 for SOB and b/l LE edema. As per chart review, patient endorses some degree of SOB and b/l LE edema x 3 months with significant weight gain. As per ICU team, patient had positive sick contacts with viral URI 1 to 2 weeks PTA and since then has had worsening SOB. Patient found to be reportedly hypoxemic to 70% on RA with worsening respiratory status/secretions and somnolence in the ED. Patient was a difficult intubation (~6 attempts). Despite optimal vent management, patient remained hypoxemic. Unable to prone due to obesity. Patient cannulated for VV ECMO on 2/25 and transferred to [facility] for further management.     Neuro  #Mental status   #pseudotumor cerebri  - history of pseudotumor cerebri s/p LP under fluoro in 2020 with high opening pressure with MRI 2020 also showed possible pituitary mass but unclear because of pressure effect from pseudotumor cerebri causing partially empty sella. Considering to start acetazolamide but never started  - prolactin high (30), defer checking cortisol axis given already received steroids but should be investigated, ACTH (<1.5) low but should be suppressed iso steroids  - concern for pituitary adenoma, however TSH .79 normal,  fT4 0.9 normal, and low T3 67  - sedated with Dilaudid, low dose propofol, and precedex gtt. Goal to wean dilaudid slightly today as tolerated   - seroquel 25mg BID held overnight iso high QTc will recheck and restart   - paralytics d/c'd 2/27   - PT/OT following     Cardiovascular  #HTN   #Atrial fibrillation   - Hx of a. fib not on AC   - echo from 2024 did not demonstrate elevation of pulmonary pressures, but now with severe PH suggesting chronicity on TTE at    - TTE on 2/25 showed LV EF 61%, enlarged RV with TAPSE 2.1cm, ePASP at least 49 (Patient had 10/2024 TTE with normal LV and RV with ePASP 14).  - ROBERT 2/26 showing VTI 17  - ROBERT 3/1 showing mild TR, enlarged RV with normal LV/RV function. PEEP increased to 24 without signs of RV dilation  - troponins initially elevated, peaked at 162 however downtrended   - s/p esmolol drip for high flows now D/C'd may need to consider again if need for higher flows  - cardene gtt started for hypertension [id number] currently on hold this AM   - ECG daily to monitor QTc     Pulmonary  #Pulmonary HTN  #ARDS  #Multifocal Pneumonia   #BEA   - history of diagnosed BEA/?OHS intermittent compliance to home PAP  - c/f acute on suspected chronic pulmonary hypertension in setting of possible BEA/OHS c/b pneumonia, ARDS, and pulmonary edema   - CTA chest on 2/24 negative for pulmonary embolism, notable for patchy bilateral lower lobe opacities, hepatomegaly, mild ascites, edema/induration of the abdominal pannus.  - Full RVP and BAL cultures negative    - Bronchoscopy demonstrated copious purulent secretions 2/26 and 2/27  - Bronchoscopy 3/1 noted with small amount of old, dried blood at R and L mainstem, not occluding airway.  Follow up BAL and cell ct  - Dexamethasone 20mg x 5 days, then 10 mg and taper as per clinical status pneumonia/ARDS  - C/w duoNeb q6 hours + 3% saline and IPV   - c/w PC/AC PIP:30  RR 14 PC: 12 PEEP:24 Fio2 40% ~380-480  - difficult glottic visualization due to large tongue upon intubation at [facility] 2/25    #ECMO  VV ECMO		  Cannulation sites/dates:  Flow: 4.86	       P1: 195		Delta P: 38  RPM: 3300	       P2: 157		SVO2: 76  Sweep: 1.5 L/min:		            FIO2: 1      - was requiring high flows for oxygenation causing high delta P but D/V adequate, no lactate and SVO2 WNL with some hemolysis but now resolving   - Clinically perfusing. Lactate: 1.8  - ECMO sweep sighing q1hr   - Adjust circuit flow as tolerated with DO2:VO2 goal >2  - Monitor for hemolysis, chatter, evidence of recirculation, mixing      Gastrointestinal  #Diet   #Transaminitis  - tolerating tube feeds  - c/w bowel regimen   - elevated Tbili likely iso hemolysis 2/2 high ECMO flows, mild transaminitis now improving  - hepatomegaly and mild ascites noted on CT A/P - no pocket to tap  - RUQ ultrasound with steatosis    - c/w bowel regimen with Senna and Miralax. S/p Relistor x 3 doses, Naloxegol held  - triglycerides 164  - nutrition following       Gu/Renal  #ELLA  - ELLA - improved, likely ATN/vascular congestion  - good UO, overall net negative  - s/p intermittent diuresis, started lasix gtt 3/2 to assist wtith agressive diuresis, currently at 10mg/h will monitor urine and electrolytes clsoely     Infectious disease  #leukocytosis  #Pneumonia   #cellulitis   - Leukocytosis now likely iso steroids, remains afebrile  - s/p cefepime, linezolid, and aztihro 2/25-2/26   - s/p vanco (2/26-3/1) and c/w zosyn for a total of ~10 days (2/26-3/8) to cover cellulitis   - full RVP negative  - BCx x 2 2/24 and 2/26 currently NGTD, UCx 2/25 NGTD, tracheal aspirate Cx from 2/25 with normal commensal kyle   - strep pneumo Ag and urine legionella negative   - Mupiricon b/l nares for +MSSA x 5 days (2/26-  )  - follow up BAL cultures 3/1, previous BAL 2/26 NGTD  - ? penile meatus yellow discharge, GC/chamydia and HIV negative   - candidal intertrigo +/- cellulitis of the pannus - clotrimazole cream 1% BID 2/26 -+ abx as per above    Hematology/DVT ppx  #Anticoagulation   #Thrombocytopenia  #Anemia   - Hb stable 12.4, platelets downtrending likely r/t shearing 2/2 ECMO circuit  - s/p 1 u PRBC 2/26 for O2 delivery  - c/w argatroban with goal 50-70  - LE duplex 2/25 neg    Endocrine  #DM  -A1c 6.7   - obesity with metabolic syndrome  - diabetes with steroid induced hypoglycemia  - c/w decadron and taper iso ARDS  - pituitary workup, steroids as above given recent weight gain but likely volume though  - c/w FS Q6h and moderate ISS, may need to adjust as tapering steroids    SKin/Lines  #Access  #Cellulitis   #DTI  - L subclavian TLC inserted 2/25   - L radial artery line 2/25  - RIJ ECMO cannula 2/26 at 20 cm  - R fem ECMO cannula 2/26 at 25 cm  - candidal intertrigo +/- cellulitis of the pannus - clotrimazole cream 1% BID 2/26 -+ abx as per above  - as per nursing staff, patient with DTI to right buttocks and B/L feet   - wound consult placed     GOC   - full code   - palliative involved  and following while on ECMO   - Mother involved in care and life partner    38 yo M w/ class III obesity, pAfib (no AC), HTN, BEA (on CPAP), history of b/l papilledema attributed to pseudotumor cerebri, who is transferred from [place] on 2/26 for VV ECMO 2/2 ARDS. Patient initially presented to [place] on 2/24 for SOB and b/l LE edema. As per chart review, patient endorses some degree of SOB and b/l LE edema x 3 months with significant weight gain. As per ICU team, patient had positive sick contacts with viral URI 1 to 2 weeks PTA and since then has had worsening SOB. Patient found to be reportedly hypoxemic to 70% on RA with worsening respiratory status/secretions and somnolence in the ED. Patient was a difficult intubation (~6 attempts). Despite optimal vent management, patient remained hypoxemic. Unable to prone due to obesity. Patient cannulated for VV ECMO on 2/25 and transferred to [facility] for further management.     Neuro  #Mental status   #pseudotumor cerebri  - history of pseudotumor cerebri s/p LP under fluoro in 2020 with high opening pressure with MRI 2020 also showed possible pituitary mass but unclear because of pressure effect from pseudotumor cerebri causing partially empty sella. Considering to start acetazolamide but never started  - prolactin high (30), defer checking cortisol axis given already received steroids but should be investigated, ACTH (<1.5) low but should be suppressed iso steroids  - concern for pituitary adenoma, however TSH .79 normal,  fT4 0.9 normal, and low T3 67  - sedated with Dilaudid, low dose propofol, and precedex gtt. Goal to wean dilaudid slightly today as tolerated   - seroquel 25mg BID held overnight iso high QTc now 465 will change to 12.5 BID for now and increase as tolerated   - paralytics d/c'd 2/27   - PT/OT following     Cardiovascular  #HTN   #Atrial fibrillation   - Hx of a. fib not on AC   - echo from 2024 did not demonstrate elevation of pulmonary pressures, but now with severe PH suggesting chronicity on TTE at    - TTE on 2/25 showed LV EF 61%, enlarged RV with TAPSE 2.1cm, ePASP at least 49 (Patient had 10/2024 TTE with normal LV and RV with ePASP 14).  - ROBERT 2/26 showing VTI 17  - ROBERT 3/1 showing mild TR, enlarged RV with normal LV/RV function. PEEP increased to 24 without signs of RV dilation  - troponins initially elevated, peaked at 162 however downtrended   - s/p esmolol drip for high flows now D/C'd may need to consider again if need for higher flows  - cardene gtt started for hypertension [id number] currently on hold this AM   - ECG daily to monitor QTc currently 465    Pulmonary  #Pulmonary HTN  #ARDS  #Multifocal Pneumonia   #BEA   - history of diagnosed BEA/?OHS intermittent compliance to home PAP  - c/f acute on suspected chronic pulmonary hypertension in setting of possible BEA/OHS c/b pneumonia, ARDS, and pulmonary edema   - CTA chest on 2/24 negative for pulmonary embolism, notable for patchy bilateral lower lobe opacities, hepatomegaly, mild ascites, edema/induration of the abdominal pannus.  - Full RVP and BAL cultures negative    - Bronchoscopy demonstrated copious purulent secretions 2/26 and 2/27  - Bronchoscopy 3/1 noted with small amount of old, dried blood at R and L mainstem, not occluding airway.  Follow up BAL and cell ct  - Dexamethasone 20mg x 5 days, then 10 mg and taper as per clinical status pneumonia/ARDS  - C/w duoNeb q6 hours + 3% saline and IPV   - c/w PC/AC PIP:30  RR 14 PC: 12 PEEP:24 Fio2 40% ~380-480  - difficult glottic visualization due to large tongue upon intubation at [facility] 2/25    #ECMO  VV ECMO		  Cannulation sites/dates:  Flow: 4.86	       P1: 195		Delta P: 38  RPM: 3300	       P2: 157		SVO2: 76  Sweep: 1.5 L/min:		            FIO2: 1      - was requiring high flows for oxygenation causing high delta P but D/V adequate, no lactate and SVO2 WNL with some hemolysis but now resolving   - Clinically perfusing. Lactate: 1.8  - ECMO sweep sighing q1hr   - Adjust circuit flow as tolerated with DO2:VO2 goal >2  - Monitor for hemolysis, chatter, evidence of recirculation, mixing      Gastrointestinal  #Diet   #Transaminitis  - tolerating tube feeds  - c/w bowel regimen   - elevated Tbili likely iso hemolysis 2/2 high ECMO flows, mild transaminitis now improving  - hepatomegaly and mild ascites noted on CT A/P - no pocket to tap  - RUQ ultrasound with steatosis    - c/w bowel regimen with Senna and Miralax. S/p Relistor x 3 doses, Naloxegol held  - triglycerides 164  - nutrition following       Gu/Renal  #ELLA  - ELLA - improved, likely ATN/vascular congestion  - good UO, overall net negative  - s/p intermittent diuresis, started lasix gtt 3/2 to assist with aggressive diuresis, currently at 10mg/h will monitor urine and electrolytes closely     Infectious disease  #leukocytosis  #Pneumonia   #cellulitis   - Leukocytosis now likely iso steroids, remains afebrile  - s/p cefepime, linezolid, and aztihro 2/25-2/26   - s/p vanco (2/26-3/1) and c/w zosyn for a total of ~10 days (2/26-3/8) to cover cellulitis   - full RVP negative  - BCx x 2 2/24 and 2/26 currently NGTD, UCx 2/25 NGTD, tracheal aspirate Cx from 2/25 with normal commensal kyle   - strep pneumo Ag and urine legionella negative   - Mupiricon b/l nares for +MSSA x 5 days (2/26-  )  - follow up BAL cultures 3/1, previous BAL 2/26 NGTD  - ? penile meatus yellow discharge, GC/chamydia and HIV negative   - candidal intertrigo +/- cellulitis of the pannus - clotrimazole cream 1% BID 2/26 -+ abx as per above    Hematology/DVT ppx  #Anticoagulation   #Thrombocytopenia  #Anemia   - Hb stable 12.4, platelets downtrending likely r/t shearing 2/2 ECMO circuit  - s/p 1 u PRBC 2/26 for O2 delivery  - c/w argatroban with goal 50-70  - LE duplex 2/25 neg    Endocrine  #DM  -A1c 6.7   - obesity with metabolic syndrome  - diabetes with steroid induced hypoglycemia  - c/w decadron and taper iso ARDS  - pituitary workup, steroids as above given recent weight gain but likely volume though  - c/w FS Q6h and moderate ISS, may need to adjust as tapering steroids    SKin/Lines  #Access  #Cellulitis   #DTI  - L subclavian TLC inserted 2/25   - L radial artery line 2/25  - RIJ ECMO cannula 2/26 at 20 cm  - R fem ECMO cannula 2/26 at 25 cm  - candidal intertrigo +/- cellulitis of the pannus - clotrimazole cream 1% BID 2/26 -+ abx as per above  - as per nursing staff, patient with DTI to right buttocks and B/L feet   - wound consult placed     GOC   - full code   - palliative involved  and following while on ECMO   - Mother involved in care and life partner    40 yo M w/ class III obesity, pAfib (no AC), HTN, BEA (on CPAP), history of b/l papilledema attributed to pseudotumor cerebri, who is transferred from [place] on 2/26 for VV ECMO 2/2 ARDS. Patient initially presented to [place] on 2/24 for SOB and b/l LE edema. As per chart review, patient endorses some degree of SOB and b/l LE edema x 3 months with significant weight gain. As per ICU team, patient had positive sick contacts with viral URI 1 to 2 weeks PTA and since then has had worsening SOB. Patient found to be reportedly hypoxemic to 70% on RA with worsening respiratory status/secretions and somnolence in the ED. Patient was a difficult intubation (~6 attempts). Despite optimal vent management, patient remained hypoxemic. Unable to prone due to obesity. Patient cannulated for VV ECMO on 2/25 and transferred to [facility] for further management.     Neuro  #Mental status   #pseudotumor cerebri  - history of pseudotumor cerebri s/p LP under fluoro in 2020 with high opening pressure with MRI 2020 also showed possible pituitary mass but unclear because of pressure effect from pseudotumor cerebri causing partially empty sella. Considering to start acetazolamide but never started  - prolactin high (30), defer checking cortisol axis given already received steroids but should be investigated, ACTH (<1.5) low but should be suppressed iso steroids  - concern for pituitary adenoma, however TSH .79 normal,  fT4 0.9 normal, and low T3 67  - sedated with Dilaudid, low dose propofol, and precedex gtt. Goal to wean dilaudid slightly today as tolerated   - seroquel 25mg BID held overnight iso high QTc now 465 will change to 12.5 BID for now and increase as tolerated   - paralytics d/c'd 2/27   - PT/OT following     Cardiovascular  #HTN   #Atrial fibrillation   - Hx of a. fib not on AC   - echo from 2024 did not demonstrate elevation of pulmonary pressures, but now with severe PH suggesting chronicity on TTE at    - TTE on 2/25 showed LV EF 61%, enlarged RV with TAPSE 2.1cm, ePASP at least 49 (Patient had 10/2024 TTE with normal LV and RV with ePASP 14).  - ROBERT 2/26 showing VTI 17  - ROBERT 3/1 showing mild TR, enlarged RV with normal LV/RV function. PEEP increased to 24 without signs of RV dilation  - troponins initially elevated, peaked at 162 however downtrended   - s/p esmolol drip for high flows now D/C'd may need to consider again if need for higher flows  - cardene gtt started for hypertension [id number] currently on hold this AM   - ECG daily to monitor QTc currently 465    Pulmonary  #Pulmonary HTN  #ARDS  #Multifocal Pneumonia   #BEA   - history of diagnosed BEA/?OHS intermittent compliance to home PAP  - c/f acute on suspected chronic pulmonary hypertension in setting of possible BEA/OHS c/b pneumonia, ARDS, and pulmonary edema   - CTA chest on 2/24 negative for pulmonary embolism, notable for patchy bilateral lower lobe opacities, hepatomegaly, mild ascites, edema/induration of the abdominal pannus.  - Full RVP and BAL cultures negative    - Bronchoscopy demonstrated copious purulent secretions 2/26 and 2/27  - Bronchoscopy 3/1 noted with small amount of old, dried blood at R and L mainstem, not occluding airway.  BAL NGTD  - s/p Dexamethasone 20mg x 5 days, now on 10 mg x5 days and taper as per clinical status pneumonia/ARDS  - C/w duoNeb q6 hours + 3% saline and IPV   - c/w PC/AC PIP:30  RR 14 PC: 12 PEEP:24 Fio2 40% ~380-480  - difficult glottic visualization due to large tongue upon intubation at [facility] 2/25  -ETT migrated x2 most likely post IPV, needed to be pushed in via bronch, will watch closely (should be 28 @lip)    #ECMO  VV ECMO		  Cannulation sites/dates:  Flow: 4.86	       P1: 195		Delta P: 38  RPM: 3300	       P2: 157		SVO2: 76  Sweep: 1.5 L/min:		            FIO2: 0.8      - was requiring high flows for oxygenation causing high delta P but D/V adequate, no lactate and SVO2 WNL with some hemolysis but now resolving   - Clinically perfusing. Lactate: 1.8  - ECMO sweep sighing q1hr   - Adjust circuit flow as tolerated with DO2:VO2 goal >2  - Monitor for hemolysis, chatter, evidence of recirculation, mixing      Gastrointestinal  #Diet   #Transaminitis  - tolerating tube feeds  - c/w bowel regimen   - elevated Tbili likely iso hemolysis 2/2 high ECMO flows, mild transaminitis now improving  - hepatomegaly and mild ascites noted on CT A/P - no pocket to tap  - RUQ ultrasound with steatosis    - c/w bowel regimen with Senna and Miralax. S/p Relistor now x 4 doses, also gave dose of magnesium citrate  - triglycerides 146  - nutrition following       Gu/Renal  #ELLA  - ELLA - improved, likely ATN/vascular congestion  - good UO, overall net negative  - s/p intermittent diuresis, started lasix gtt 3/2 to assist with aggressive diuresis, currently at 10mg/h will monitor urine and electrolytes closely     Infectious disease  #leukocytosis  #Pneumonia   #cellulitis   - Leukocytosis now likely iso steroids, remains afebrile  - s/p cefepime, linezolid, and aztihro 2/25-2/26   - s/p vanco (2/26-3/1) and c/w zosyn for a total of ~10 days (2/26-3/8) to cover cellulitis   - full RVP negative  - BCx x 2 2/24 and 2/26 currently NGTD, UCx 2/25 NGTD, tracheal aspirate Cx from 2/25 with normal commensal kyle   - strep pneumo Ag and urine legionella negative   - Mupiricon b/l nares for +MSSA x 5 days (2/26-  )  - BAL cultures 3/1 currently NGTD, previous BAL 2/26 NGTD  - ? penile meatus yellow discharge, GC/chamydia and HIV negative   - candidal intertrigo +/- cellulitis of the pannus - clotrimazole cream 1% BID 2/26 -+ abx as per above    Hematology/DVT ppx  #Anticoagulation   #Thrombocytopenia  #Anemia   - Hb stable 12.4, platelets downtrending likely r/t shearing 2/2 ECMO circuit  - s/p 1 u PRBC 2/26 for O2 delivery  - c/w argatroban with goal 50-70  - LE duplex 2/25 neg    Endocrine  #DM  -A1c 6.7   - obesity with metabolic syndrome  - diabetes with steroid induced hypoglycemia  - c/w decadron and taper iso ARDS  - pituitary workup, steroids as above given recent weight gain but likely volume though  - c/w FS Q6h and moderate ISS, may need to adjust as tapering steroids    SKin/Lines  #Access  #Cellulitis   #DTI  - L subclavian TLC inserted 2/25   - L radial artery line 2/25  - RIJ ECMO cannula 2/26 at 20 cm  - R fem ECMO cannula 2/26 at 25 cm  - candidal intertrigo +/- cellulitis of the pannus - clotrimazole cream 1% BID 2/26 -+ abx as per above  - as per nursing staff, patient with DTI to right buttocks and B/L feet   - wound consult placed     GOC   - full code   - palliative involved  and following while on ECMO   - Mother involved in care and life partner

## 2025-03-03 NOTE — ADVANCED PRACTICE NURSE CONSULT - RECOMMEDATIONS
Recommend STEPHEN/PVR to r/o arterial disease.     Topical recommendations:   ---Sacrum, gluteal cleft, bilateral buttocks: Cleanse with skin cleanser, pat dry. Apply TRIAD paste twice a day and PRN with incontinent episodes. With episodes of incontinence only remove soiled layer of Triad, then reinforce with thin layer.   ---Bilateral plantar feet: Apply Liquid barrier film daily, continue to offload with positioning pillows.   ---Bilateral abdominal pannus, groin: After cleansing, apply Interdry textile sheeting, under intertriginous folds leaving 2 inches exposed at ends to wick, remove to wash & dry affected area, then replace. Individual sheeting may be used for up to 5 days unless soiled. Inspect skin daily.   ---While inpatient continue low air loss bed therapy, continue heel elevation, continue to turn & reposition per protocol, soft pillow between bony prominences, continue moisture management with barrier creams & single breathable pad, continue measures to decrease friction/shear/pressure.     Plan discussed with covering JAMILA Pedro at bedside.   Please contact Wound/Ostomy Care Service Line if we can be of further assistance (ext [phone]). Please reconsult if changes to tissue type noted.      Recommend STEPHEN/PVR to r/o arterial disease.     Topical recommendations:   ---Sacrum, gluteal cleft, bilateral buttocks: Cleanse with skin cleanser, pat dry. Apply TRIAD paste twice a day and PRN with incontinent episodes. With episodes of incontinence only remove soiled layer of Triad, then reinforce with thin layer.   ---Bilateral plantar feet: Apply Liquid barrier film daily, continue to offload with positioning pillows.   ---Bilateral abdominal pannus, groin: After cleansing, apply Interdry textile sheeting, under intertriginous folds leaving 2 inches exposed at ends to wick, remove to wash & dry affected area, then replace. Individual sheeting may be used for up to 5 days unless soiled. Inspect skin daily.   ---While inpatient continue low air loss bed therapy, continue heel elevation, continue to turn & reposition per protocol, soft pillow between bony prominences, continue moisture management with barrier creams & single breathable pad, continue measures to decrease friction/shear/pressure.     Plan discussed with primary RN Nora SOLORZANO, TYSON Bello.   Please contact Wound/Ostomy Care Service Line if we can be of further assistance (ext [phone]). Please reconsult if changes to tissue type noted.

## 2025-03-03 NOTE — PROCEDURE NOTE - NSBRONCHPROCDETAILS_GEN_A_CORE_FT
Flexible bronchoscopy performed for ET tube position adjustment. ET tube properly positioned, 28 cm at lip, 2-3 cm from jerardo. CXR pending. L and R mainstem bronchi  with the presence of some nonbloody secretions which were therapeutically aspirated.

## 2025-03-03 NOTE — ADVANCED PRACTICE NURSE CONSULT - ASSESSMENT
Patient on multiple gtts including sedatives, paralytics, procardia. Ventilator dependent, + ETT mucosa intact. ECMO ongoing, R groin catheter in place. peritubular skin intact. Patient bedbound, obese, incontinent of urine and stool. Skin warm, dry with increased moisture in intertriginous folds, adequate skin turgor, scattered areas of hyperpigmentation and hypopigmentation.     Sacrum/gluteal cleft: incontinence and moisture associated dermatitis as evidenced by erythema and increased moisture, denudations exposing moist friable dermis. No evidence of candidiasis.    Right buttock: DTPI complicated by moisture, [id number], 100% purple maroon discoloration. No drainage, no odor. Periwound with blanching erythema, IAD/MAD, no increased warmth, no edema, no induration, no fluctuance no signs or symptoms of overt skin infection.  Goals of care: offload pressure, protect from friction and shear, monitor for tissue type changes.     Bilateral lower extremities with scattered areas of hyper and hypopigmentation. Dry, flaky skin. Thin, shiny, taught skin. Increased coolness from midfoot to distal toes. +3 Edema. Capillary refill >3 seconds. Nonpalpable DP/PT pulses, with monophasic doppler sounds. Bilateral plantar feet and toes with blood filled blisters likely 2/2 arterial compromise.     Right plantar foot, including planar aspects of 4th and 5th toes, area measured in a cluster: [id number]. No drainage, no odor.     Left plantar foot, including plantar aspects of 1st to 5th toes, area measured in a cluster: [id number]. No drainage, no odor.     Periwounds with no erythema, no increased warmth, no edema, no induration, no fluctuance no signs or symptoms of overt skin infection. Goals of care: offload pressure, protect from friction and shear, monitor for tissue type changes.         Patient continues to be critically ill- at high risk for skin breakdown. On assessment patient on a low air loss surface, heel offloading pillows, Z-flow positioning pillow utilized, moisture management in place with use of one breathable incontinence pad. Turned and positioned per protocol.

## 2025-03-03 NOTE — CHART NOTE - NSCHARTNOTEFT_GEN_A_CORE
Procedure: Transesophageal Echocardiogram  Indication: hypoxemia, ARDS  Consent: yes  Operators: Nathan Fletcher  Anesthesia: propofol, hydromorphone, midazolam, cisatracurium    Findings:    AV: normal, no AI    MV: normal, no MR     PV: poorly visualized, normal, no PI    TV: normal, no TR    LV: normal size and systolic function, LV wall thickened on gross inspection    RV: normal dimensions, grossly normal systolic function and axial excursion    LA: normal size, MICAH clear with peak velocity during atrial diastole > 70 cm/s, suggesting low risk of LA thrombus    RA: normal size and function    IVC: plethoric in setting of presence of IVC ECMO cannula, with cannula terminus well-positioned distal to the hepatic vein    SVC: < 36% variability, suggesting lack of volume responsiveness      Aorta: normal    PA: normal. no thrombus visualized, pulmonic acceleration time 135 ms suggestive of mean pulmonary arterial pressure < 20 mmHg but with decrease in acceleration time to 112 ms with inspiration suggesting some effect of transpulmonary pressure on the pulmonary artery, which would be consistent with underlying pulmonary hypertension    Doppler:  E/A 2.5 with E wave velocity 60 cm/s, e' 7 cm/s, E/e' = 8.5, indeterminate for elevation of left atrial pressure and indeterminate diastolic dysfunction, VTI 17 cm, and LVOT diameter previously measured at 2.4 cm, equating to a cardiac output of 6.9 L/min at 90 bpm    Other: transesophageal lung ultrasound demonstrated bilateral translobar consolidation without significant blood flow    Images stored in [facility] Procedure: Transesophageal Echocardiogram  Indication: hypoxemia, ARDS  Consent: yes  Operators: Nathan Fletcher  Anesthesia: propofol, hydromorphone, midazolam, cisatracurium    Findings:    AV: normal, no AI    MV: normal, no MR     PV: poorly visualized, normal, no PI    TV: normal, no TR    LV: normal size and systolic function, LV wall thickened on gross inspection    RV: normal dimensions, grossly normal systolic function and axial excursion    LA: normal size, MICAH clear with peak velocity during atrial diastole > 70 cm/s, suggesting low risk of LA thrombus    RA: normal size and function    IVC: plethoric in setting of presence of IVC ECMO cannula, with cannula terminus well-positioned distal to the hepatic vein    SVC: < 36% variability, suggesting lack of volume responsiveness      Aorta: normal    PA: normal. no thrombus visualized, pulmonic acceleration time 135 ms suggestive of mean pulmonary arterial pressure < 20 mmHg but with decrease in acceleration time to 112 ms with inspiration; suggestion of possible consistency with underlying pulmonary hypertension    Doppler:  E/A 2.5 with E wave velocity 60 cm/s, e' 7 cm/s, E/e' = 8.5, indeterminate for elevation of left atrial pressure and indeterminate diastolic dysfunction, VTI 17 cm, and LVOT diameter previously measured at 2.4 cm, equating to a cardiac output of 6.9 L/min at 90 bpm    Other: negative intracardiac of intrapulmonary shunting with agitated saline injection. Transesophageal lung ultrasound demonstrated bilateral translobar basilar consolidation without significant blood flow    Images stored in [facility] Procedure: Transesophageal Echocardiogram  Indication: hypoxemia, ARDS  Consent: yes  Operators: Nathan Fletcher  Anesthesia: propofol, hydromorphone, midazolam, cisatracurium    Findings:    AV: normal, no AI    MV: normal, no MR     PV: poorly visualized, normal, no PI    TV: normal, no TR    LV: normal size and systolic function, LV wall thickened on gross inspection    RV: normal dimensions, grossly normal systolic function and axial excursion    LA: normal size, MICAH clear with peak velocity during atrial diastole > 70 cm/s, suggesting low risk of LA thrombus    RA: normal size and function    IVC: plethoric in setting of presence of IVC ECMO cannula, with cannula terminus well-positioned distal to the hepatic vein    SVC: < 36% variability, suggesting lack of volume responsiveness      Aorta: normal    PA: normal. no thrombus visualized, pulmonic acceleration time 135 ms suggestive of mean pulmonary arterial pressure < 20 mmHg but with decrease in acceleration time to 112 ms with inspiration; suggestion of possible consistency with underlying pulmonary hypertension    Doppler:  E/A 2.5 with E wave velocity 60 cm/s, e' 7 cm/s, E/e' = 8.5, indeterminate for elevation of left atrial pressure and indeterminate diastolic dysfunction, VTI 17 cm, and LVOT diameter previously measured at 2.4 cm, equating to a cardiac output of 6.9 L/min at 90 bpm    Other: negative intracardiac of intrapulmonary shunting with agitated saline injection. Transesophageal lung ultrasound demonstrated bilateral translobar basilar consolidation without significant blood flow. Very trace bilateral pleural effusions.    Images stored in [facility] Procedure: Transesophageal Echocardiogram  Indication: hypoxemia, ARDS  Consent: yes  Operators: Nathan Fletcher  Anesthesia: propofol, hydromorphone, midazolam, cisatracurium    Findings:    AV: normal, no AI    MV: normal, no MR     PV: poorly visualized, normal, no PI    TV: normal, no TR    LV: normal size and systolic function, LV wall thickened on gross inspection    RV: normal dimensions, grossly normal systolic function and axial excursion    LA: normal size, MICAH clear with peak velocity during atrial diastole > 70 cm/s, suggesting low risk of LA thrombus    RA: normal size and function    IVC: plethoric in setting of presence of IVC ECMO cannula, with cannula terminus well-positioned distal to the hepatic vein    SVC: < 36% variability, suggesting lack of volume responsiveness      Aorta: normal    PA: normal. no thrombus visualized, pulmonic acceleration time 135 ms suggestive of mean pulmonary arterial pressure < 20 mmHg but with decrease in acceleration time to 112 ms with inspiration; suggestion of possible consistency with underlying pulmonary hypertension    Doppler:  E/A 2.5 with E wave velocity 60 cm/s, e' 7 cm/s, E/e' = 8.5, indeterminate for elevation of left atrial pressure and indeterminate diastolic dysfunction, VTI 17 cm, and LVOT diameter previously measured at 2.4 cm, equating to a cardiac output of 6.9 L/min at 90 bpm    Other: negative intracardiac of intrapulmonary shunting with agitated saline injection. Transesophageal lung ultrasound demonstrated bilateral translobar basilar consolidation without significant blood flow. Very trace bilateral pleural effusions.    Images stored in [facility]    With Nathan LINDO at bedside

## 2025-03-03 NOTE — PROGRESS NOTE ADULT - SUBJECTIVE AND OBJECTIVE BOX
Interval Events:   -responded well to lasix gtt overnight   -slightly hypernatremic iso of aggressive diuresis       HPI:  38 yo M w/ class III obesity, pAfib (no AC), HTN, BEA (on CPAP), history of b/l papilledema attributed to pseudotumor cerebri s/p LP in 2024 with very high opening pressure, who is transferred for VV ECMO for ARDS and severe hypoxia. Patient initially presented to [place] on 2/24 for SOB and b/l LE edema. 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 He is a current every day smoker of Newports and marijuana, but does not drink or do other drugs. Currently lives with his mother. There is a long term partner in his life, but they are not , and they have an on/off relationship currently not very involved with each other as per the patient's mother and aunt.  At [facility] where he was getting an eval last year for shortness of breath, he had a sleep study and was diagnosed with sleep apnea, prescribed a PAP machine, which he has in his room but the mother is not sure how many nights per week he uses it. Recently, the last day or two, his mother and brother have been under the weather with URIs and the patient 2 days ago started to develop a ruynny nose, ough, some wheezing of the chest, as well as worsening shortness of breath and fevers at home. He called his aunt who told him to go get checked out and then he landed at the [place] ED. Patient found to be reportedly hypoxemic to 70% on RA with worsening respiratory status/secretions and somnolence in the ED. Patient was intubated with difficulty (7 attempts) due to very large tongue secretions. Despite optimal vent management, patient remained hypoxemic. Unable to prone due to obesity. Patient cannulated for VV ECMO on 2/25 and transferred to [facility] for further management.     [place] labs notable for MRSA PCR -, Fluvid -, urine legionella -, sputum gram stain  with GPC and GPR    CTA chest on 2/24 negative for pulmonary embolism, notable for patchy bilateral lower lobe opacities, hepatomegaly, mild ascites, edema/induration of the abdominal pannus.    LE duplex on 2/25 negative for DVT    TTE on 2/25 showed LV EF 61%, enlarged RV with TAPSE 2.1cm, ePASP at least 49 (Patient had 10/2024 TTE with normal LV and RV with ePASP 14).    Only home med is Lasix. Not on acetazolamide for pseudotumor or on Wegovy (was pending auth) (26 Feb 2025 01:48)      REVIEW OF SYSTEMS:    [x ] Unable to assess ROS because patient is intubated/sedated     OBJECTIVE:  ICU Vital Signs Last 24 Hrs  T(C): 36.2 (03 Mar 2025 06:00), Max: 36.3 (03 Mar 2025 02:00)  T(F): 97.2 (03 Mar 2025 06:00), Max: 97.3 (03 Mar 2025 02:00)  HR: 56 (03 Mar 2025 06:00) (54 - 71)  BP: --  BP(mean): --  ABP: 135/73 (03 Mar 2025 06:00) (115/60 - 140/71)  ABP(mean): 93 (03 Mar 2025 06:00) (76 - 94)  RR: 15 (03 Mar 2025 06:00) (13 - 19)  SpO2: 98% (03 Mar 2025 06:00) (95% - 100%)    O2 Parameters below as of 03 Mar 2025 06:00  Patient On (Oxygen Delivery Method): ventilator    O2 Concentration (%): 40      Mode: AC/ CMV (Assist Control/ Continuous Mandatory Ventilation), RR (machine): 14, FiO2: 40, PEEP: 24, ITime: 1, MAP: 28, PC: 12, PIP: 36    03-01 @ 07:01  -  03-02 @ 07:00  --------------------------------------------------------  IN: 4675.7 mL / OUT: 6865 mL / NET: -2189.3 mL    03-02 @ 07:01  -  03-03 @ 06:23  --------------------------------------------------------  IN: 5146.2 mL / OUT: 8285 mL / NET: -3138.8 mL      CAPILLARY BLOOD GLUCOSE      POCT Blood Glucose.: 225 mg/dL (03 Mar 2025 05:04)    Physical Exam:   Constitutional: ill appearing, no acute distress   HEENT: + PERRLA, EOMI, no drainage or redness  Neck: supple,  No JVD, Right IJ ecmo cannula in place   Respiratory: Ventilator assisted breath Sounds diminished bilaterally to auscultation, no accessory muscle use noted  Cardiovascular: Regular rate, regular rhythm, normal S1, S2; no murmurs or rub  Gastrointestinal: Obese, soft, non distended, no hepatosplenomegaly, normal bowel sounds  Extremities: + 2 peripheral edema, no cyanosis, no clubbing   Vascular: Equal and normal pulses: 2+ peripheral pulses throughout  Neurological: sedated/intubated  Musculoskeletal: No joint swelling or deformity; no limitation of movement  Skin: warm, dry, well perfused, cellulitis to pannus area, DTI to right buttocks     HOSPITAL MEDICATIONS:  Standing Meds:  albuterol/ipratropium for Nebulization 3 milliLiter(s) Nebulizer every 6 hours  argatroban Infusion 1.094 MICROgram(s)/kG/Min IV Continuous <Continuous>  chlorhexidine 0.12% Liquid 15 milliLiter(s) Oral Mucosa every 12 hours  chlorhexidine 2% Cloths 1 Application(s) Topical <User Schedule>  clotrimazole 1% Cream 1 Application(s) Topical two times a day  dexMEDEtomidine Infusion 1 MICROgram(s)/kG/Hr IV Continuous <Continuous>  dextrose 50% Injectable 25 Gram(s) IV Push once  dextrose 50% Injectable 12.5 Gram(s) IV Push once  dextrose 50% Injectable 25 Gram(s) IV Push once  furosemide Infusion 15 mG/Hr IV Continuous <Continuous>  glucagon  Injectable 1 milliGRAM(s) IntraMuscular once  HYDROmorphone Infusion. 1 mG/Hr IV Continuous <Continuous>  insulin lispro (ADMELOG) corrective regimen sliding scale   SubCutaneous every 6 hours  multivitamin/minerals/iron Oral Solution (CENTRUM) 15 milliLiter(s) Oral daily  mupirocin 2% Ointment 1 Application(s) Topical two times a day  niCARdipine Infusion 7.5 mG/Hr IV Continuous <Continuous>  pantoprazole  Injectable 40 milliGRAM(s) IV Push daily  petrolatum Ophthalmic Ointment 1 Application(s) Both EYES every 12 hours  piperacillin/tazobactam IVPB.. 4.5 Gram(s) IV Intermittent every 8 hours  polyethylene glycol 3350 17 Gram(s) Oral two times a day  propofol Infusion 25 MICROgram(s)/kG/Min IV Continuous <Continuous>  senna Syrup 10 milliLiter(s) Oral two times a day  sodium chloride 3%  Inhalation 4 milliLiter(s) Inhalation every 6 hours      PRN Meds:      LABS:                        12.2   18.59 )-----------( 141      ( 03 Mar 2025 03:45 )             43.1     Hgb Trend: 12.2<--, 11.9<--, 11.9<--, 12.2<--, 12.1<--  03-03    146[H]  |  106  |  40[H]  ----------------------------<  216[H]  3.9   |  29  |  0.86    Ca    8.2[L]      03 Mar 2025 03:45  Phos  3.9     03-03  Mg     1.90     03-03    TPro  6.6  /  Alb  2.6[L]  /  TBili  1.3[H]  /  DBili  x   /  AST  66[H]  /  ALT  80[H]  /  AlkPhos  58  03-03    Creatinine Trend: 0.86<--, 0.84<--, 0.84<--, 0.76<--, 0.79<--, 0.79<--  PT/INR - ( 03 Mar 2025 03:45 )   PT: 31.0 sec;   INR: 2.70 ratio         PTT - ( 03 Mar 2025 03:45 )  PTT:61.0 sec  Urinalysis Basic - ( 03 Mar 2025 03:45 )    Color: x / Appearance: x / SG: x / pH: x  Gluc: 216 mg/dL / Ketone: x  / Bili: x / Urobili: x   Blood: x / Protein: x / Nitrite: x   Leuk Esterase: x / RBC: x / WBC x   Sq Epi: x / Non Sq Epi: x / Bacteria: x      Arterial Blood Gas:  03-03 @ 03:45  7.41/54/130/34/98.8/7.9  ABG lactate: --  Arterial Blood Gas:  03-02 @ 21:45  7.38/58/123/34/98.8/7.4  ABG lactate: --  Arterial Blood Gas:  03-02 @ 16:35  7.36/56/107/32/98.3/4.8  ABG lactate: --  Arterial Blood Gas:  03-02 @ 09:35  7.39/54/76/33/95.9/6.3  ABG lactate: --  Arterial Blood Gas:  03-02 @ 08:35  7.41/49/105/31/99.0/5.4  ABG lactate: --  Arterial Blood Gas:  03-02 @ 03:40  7.40/52/91/32/97.7/6.1  ABG lactate: --  Arterial Blood Gas:  03-01 @ 21:30  7.39/53/119/32/98.9/5.8  ABG lactate: --  Arterial Blood Gas:  03-01 @ 17:46  7.38/54/80/32/96.4/5.4  ABG lactate: --  Arterial Blood Gas:  03-01 @ 15:55  7.36/56/94/32/97.6/4.7  ABG lactate: --  Arterial Blood Gas:  03-01 @ 12:53  7.36/55/84/31/96.3/4.3  ABG lactate: --        MICROBIOLOGY:     Culture - Fungal, Bronchial (collected 01 Mar 2025 12:45)  Source: Bronchial Bronchial Lavage  Preliminary Report (02 Mar 2025 12:14):    Testing in progress    Culture - Acid Fast - Bronchial w/Smear (collected 01 Mar 2025 12:45)  Source: Bronchial Bronchial Lavage    Culture - Bronchial (collected 01 Mar 2025 12:45)  Source: Bronchial Bronchial Lavage  Gram Stain (01 Mar 2025 21:53):    Few polymorphonuclear leukocytes per low power field    No Squamous epithelial cells per low power field    No organisms seen per oil power field  Preliminary Report (02 Mar 2025 10:01):    No growth to date         Interval Events:   -responded well to lasix gtt overnight   -slightly hypernatremic iso of aggressive diuresis   -seroquel held for high QTc      HPI:  38 yo M w/ class III obesity, pAfib (no AC), HTN, BEA (on CPAP), history of b/l papilledema attributed to pseudotumor cerebri s/p LP in 2024 with very high opening pressure, who is transferred for VV ECMO for ARDS and severe hypoxia. Patient initially presented to [place] on 2/24 for SOB and b/l LE edema. 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 He is a current every day smoker of Newports and marijuana, but does not drink or do other drugs. Currently lives with his mother. There is a long term partner in his life, but they are not , and they have an on/off relationship currently not very involved with each other as per the patient's mother and aunt.  At [facility] where he was getting an eval last year for shortness of breath, he had a sleep study and was diagnosed with sleep apnea, prescribed a PAP machine, which he has in his room but the mother is not sure how many nights per week he uses it. Recently, the last day or two, his mother and brother have been under the weather with URIs and the patient 2 days ago started to develop a ruynny nose, ough, some wheezing of the chest, as well as worsening shortness of breath and fevers at home. He called his aunt who told him to go get checked out and then he landed at the [place] ED. Patient found to be reportedly hypoxemic to 70% on RA with worsening respiratory status/secretions and somnolence in the ED. Patient was intubated with difficulty (7 attempts) due to very large tongue secretions. Despite optimal vent management, patient remained hypoxemic. Unable to prone due to obesity. Patient cannulated for VV ECMO on 2/25 and transferred to [facility] for further management.     [place] labs notable for MRSA PCR -, Fluvid -, urine legionella -, sputum gram stain  with GPC and GPR    CTA chest on 2/24 negative for pulmonary embolism, notable for patchy bilateral lower lobe opacities, hepatomegaly, mild ascites, edema/induration of the abdominal pannus.    LE duplex on 2/25 negative for DVT    TTE on 2/25 showed LV EF 61%, enlarged RV with TAPSE 2.1cm, ePASP at least 49 (Patient had 10/2024 TTE with normal LV and RV with ePASP 14).    Only home med is Lasix. Not on acetazolamide for pseudotumor or on Wegovy (was pending auth) (26 Feb 2025 01:48)      REVIEW OF SYSTEMS:    [x ] Unable to assess ROS because patient is intubated/sedated     OBJECTIVE:  ICU Vital Signs Last 24 Hrs  T(C): 36.2 (03 Mar 2025 06:00), Max: 36.3 (03 Mar 2025 02:00)  T(F): 97.2 (03 Mar 2025 06:00), Max: 97.3 (03 Mar 2025 02:00)  HR: 56 (03 Mar 2025 06:00) (54 - 71)  BP: --  BP(mean): --  ABP: 135/73 (03 Mar 2025 06:00) (115/60 - 140/71)  ABP(mean): 93 (03 Mar 2025 06:00) (76 - 94)  RR: 15 (03 Mar 2025 06:00) (13 - 19)  SpO2: 98% (03 Mar 2025 06:00) (95% - 100%)    O2 Parameters below as of 03 Mar 2025 06:00  Patient On (Oxygen Delivery Method): ventilator    O2 Concentration (%): 40      Mode: AC/ CMV (Assist Control/ Continuous Mandatory Ventilation), RR (machine): 14, FiO2: 40, PEEP: 24, ITime: 1, MAP: 28, PC: 12, PIP: 36    03-01 @ 07:01  -  03-02 @ 07:00  --------------------------------------------------------  IN: 4675.7 mL / OUT: 6865 mL / NET: -2189.3 mL    03-02 @ 07:01  -  03-03 @ 06:23  --------------------------------------------------------  IN: 5146.2 mL / OUT: 8285 mL / NET: -3138.8 mL      CAPILLARY BLOOD GLUCOSE      POCT Blood Glucose.: 225 mg/dL (03 Mar 2025 05:04)    Physical Exam:   Constitutional: ill appearing, no acute distress   HEENT: + PERRLA, EOMI, no drainage or redness  Neck: supple,  No JVD, Right IJ ecmo cannula in place   Respiratory: Ventilator assisted breath Sounds diminished bilaterally to auscultation, no accessory muscle use noted  Cardiovascular: Regular rate, regular rhythm, normal S1, S2; no murmurs or rub  Gastrointestinal: Obese, soft, non distended, no hepatosplenomegaly, normal bowel sounds  Extremities: + 2 peripheral edema, no cyanosis, no clubbing   Vascular: Equal and normal pulses: 2+ peripheral pulses throughout  Neurological: sedated/intubated  Musculoskeletal: No joint swelling or deformity; no limitation of movement  Skin: warm, dry, well perfused, cellulitis to pannus area, DTI to right buttocks     HOSPITAL MEDICATIONS:  Standing Meds:  albuterol/ipratropium for Nebulization 3 milliLiter(s) Nebulizer every 6 hours  argatroban Infusion 1.094 MICROgram(s)/kG/Min IV Continuous <Continuous>  chlorhexidine 0.12% Liquid 15 milliLiter(s) Oral Mucosa every 12 hours  chlorhexidine 2% Cloths 1 Application(s) Topical <User Schedule>  clotrimazole 1% Cream 1 Application(s) Topical two times a day  dexMEDEtomidine Infusion 1 MICROgram(s)/kG/Hr IV Continuous <Continuous>  dextrose 50% Injectable 25 Gram(s) IV Push once  dextrose 50% Injectable 12.5 Gram(s) IV Push once  dextrose 50% Injectable 25 Gram(s) IV Push once  furosemide Infusion 15 mG/Hr IV Continuous <Continuous>  glucagon  Injectable 1 milliGRAM(s) IntraMuscular once  HYDROmorphone Infusion. 1 mG/Hr IV Continuous <Continuous>  insulin lispro (ADMELOG) corrective regimen sliding scale   SubCutaneous every 6 hours  multivitamin/minerals/iron Oral Solution (CENTRUM) 15 milliLiter(s) Oral daily  mupirocin 2% Ointment 1 Application(s) Topical two times a day  niCARdipine Infusion 7.5 mG/Hr IV Continuous <Continuous>  pantoprazole  Injectable 40 milliGRAM(s) IV Push daily  petrolatum Ophthalmic Ointment 1 Application(s) Both EYES every 12 hours  piperacillin/tazobactam IVPB.. 4.5 Gram(s) IV Intermittent every 8 hours  polyethylene glycol 3350 17 Gram(s) Oral two times a day  propofol Infusion 25 MICROgram(s)/kG/Min IV Continuous <Continuous>  senna Syrup 10 milliLiter(s) Oral two times a day  sodium chloride 3%  Inhalation 4 milliLiter(s) Inhalation every 6 hours      PRN Meds:      LABS:                        12.2   18.59 )-----------( 141      ( 03 Mar 2025 03:45 )             43.1     Hgb Trend: 12.2<--, 11.9<--, 11.9<--, 12.2<--, 12.1<--  03-03    146[H]  |  106  |  40[H]  ----------------------------<  216[H]  3.9   |  29  |  0.86    Ca    8.2[L]      03 Mar 2025 03:45  Phos  3.9     03-03  Mg     1.90     03-03    TPro  6.6  /  Alb  2.6[L]  /  TBili  1.3[H]  /  DBili  x   /  AST  66[H]  /  ALT  80[H]  /  AlkPhos  58  03-03    Creatinine Trend: 0.86<--, 0.84<--, 0.84<--, 0.76<--, 0.79<--, 0.79<--  PT/INR - ( 03 Mar 2025 03:45 )   PT: 31.0 sec;   INR: 2.70 ratio         PTT - ( 03 Mar 2025 03:45 )  PTT:61.0 sec  Urinalysis Basic - ( 03 Mar 2025 03:45 )    Color: x / Appearance: x / SG: x / pH: x  Gluc: 216 mg/dL / Ketone: x  / Bili: x / Urobili: x   Blood: x / Protein: x / Nitrite: x   Leuk Esterase: x / RBC: x / WBC x   Sq Epi: x / Non Sq Epi: x / Bacteria: x      Arterial Blood Gas:  03-03 @ 03:45  7.41/54/130/34/98.8/7.9  ABG lactate: --  Arterial Blood Gas:  03-02 @ 21:45  7.38/58/123/34/98.8/7.4  ABG lactate: --  Arterial Blood Gas:  03-02 @ 16:35  7.36/56/107/32/98.3/4.8  ABG lactate: --  Arterial Blood Gas:  03-02 @ 09:35  7.39/54/76/33/95.9/6.3  ABG lactate: --  Arterial Blood Gas:  03-02 @ 08:35  7.41/49/105/31/99.0/5.4  ABG lactate: --  Arterial Blood Gas:  03-02 @ 03:40  7.40/52/91/32/97.7/6.1  ABG lactate: --  Arterial Blood Gas:  03-01 @ 21:30  7.39/53/119/32/98.9/5.8  ABG lactate: --  Arterial Blood Gas:  03-01 @ 17:46  7.38/54/80/32/96.4/5.4  ABG lactate: --  Arterial Blood Gas:  03-01 @ 15:55  7.36/56/94/32/97.6/4.7  ABG lactate: --  Arterial Blood Gas:  03-01 @ 12:53  7.36/55/84/31/96.3/4.3  ABG lactate: --        MICROBIOLOGY:     Culture - Fungal, Bronchial (collected 01 Mar 2025 12:45)  Source: Bronchial Bronchial Lavage  Preliminary Report (02 Mar 2025 12:14):    Testing in progress    Culture - Acid Fast - Bronchial w/Smear (collected 01 Mar 2025 12:45)  Source: Bronchial Bronchial Lavage    Culture - Bronchial (collected 01 Mar 2025 12:45)  Source: Bronchial Bronchial Lavage  Gram Stain (01 Mar 2025 21:53):    Few polymorphonuclear leukocytes per low power field    No Squamous epithelial cells per low power field    No organisms seen per oil power field  Preliminary Report (02 Mar 2025 10:01):    No growth to date

## 2025-03-04 LAB
ALBUMIN SERPL ELPH-MCNC: 2.8 G/DL — LOW (ref 3.3–5)
ALBUMIN SERPL ELPH-MCNC: 2.8 G/DL — LOW (ref 3.3–5)
ALBUMIN SERPL ELPH-MCNC: 3 G/DL — LOW (ref 3.3–5)
ALBUMIN SERPL ELPH-MCNC: 3 G/DL — LOW (ref 3.3–5)
ALP SERPL-CCNC: 55 U/L — SIGNIFICANT CHANGE UP (ref 40–120)
ALP SERPL-CCNC: 57 U/L — SIGNIFICANT CHANGE UP (ref 40–120)
ALP SERPL-CCNC: 58 U/L — SIGNIFICANT CHANGE UP (ref 40–120)
ALP SERPL-CCNC: 61 U/L — SIGNIFICANT CHANGE UP (ref 40–120)
ALT FLD-CCNC: 114 U/L — HIGH (ref 4–41)
ALT FLD-CCNC: 114 U/L — HIGH (ref 4–41)
ALT FLD-CCNC: 119 U/L — HIGH (ref 4–41)
ALT FLD-CCNC: 130 U/L — HIGH (ref 4–41)
ANION GAP SERPL CALC-SCNC: 11 MMOL/L — SIGNIFICANT CHANGE UP (ref 7–14)
ANION GAP SERPL CALC-SCNC: 7 MMOL/L — SIGNIFICANT CHANGE UP (ref 7–14)
ANION GAP SERPL CALC-SCNC: 8 MMOL/L — SIGNIFICANT CHANGE UP (ref 7–14)
ANION GAP SERPL CALC-SCNC: 9 MMOL/L — SIGNIFICANT CHANGE UP (ref 7–14)
APTT BLD: 57.1 SEC — HIGH (ref 24.5–35.6)
APTT BLD: 59.7 SEC — HIGH (ref 24.5–35.6)
APTT BLD: 60.2 SEC — HIGH (ref 24.5–35.6)
APTT BLD: 61 SEC — HIGH (ref 24.5–35.6)
AST SERPL-CCNC: 56 U/L — HIGH (ref 4–40)
AST SERPL-CCNC: 60 U/L — HIGH (ref 4–40)
AST SERPL-CCNC: 66 U/L — HIGH (ref 4–40)
AST SERPL-CCNC: 67 U/L — HIGH (ref 4–40)
BASOPHILS # BLD AUTO: 0.01 K/UL — SIGNIFICANT CHANGE UP (ref 0–0.2)
BASOPHILS # BLD AUTO: 0.01 K/UL — SIGNIFICANT CHANGE UP (ref 0–0.2)
BASOPHILS # BLD AUTO: 0.02 K/UL — SIGNIFICANT CHANGE UP (ref 0–0.2)
BASOPHILS # BLD AUTO: 0.02 K/UL — SIGNIFICANT CHANGE UP (ref 0–0.2)
BASOPHILS NFR BLD AUTO: 0.1 % — SIGNIFICANT CHANGE UP (ref 0–2)
BILIRUB SERPL-MCNC: 1 MG/DL — SIGNIFICANT CHANGE UP (ref 0.2–1.2)
BILIRUB SERPL-MCNC: 1 MG/DL — SIGNIFICANT CHANGE UP (ref 0.2–1.2)
BILIRUB SERPL-MCNC: 1.1 MG/DL — SIGNIFICANT CHANGE UP (ref 0.2–1.2)
BILIRUB SERPL-MCNC: 1.2 MG/DL — SIGNIFICANT CHANGE UP (ref 0.2–1.2)
BLOOD GAS ARTERIAL - LYTES,HGB,ICA,LACT RESULT: SIGNIFICANT CHANGE UP
BLOOD GAS ARTERIAL COMPREHENSIVE RESULT: SIGNIFICANT CHANGE UP
BLOOD GAS ECMO POST MEMBRANE - ARTERIAL RESULT: SIGNIFICANT CHANGE UP
BLOOD GAS ECMO POST MEMBRANE - ARTERIAL RESULT: SIGNIFICANT CHANGE UP
BLOOD GAS ECMO PRE MEMBRANE - VENOUS RESULT: SIGNIFICANT CHANGE UP
BLOOD GAS ECMO PRE MEMBRANE - VENOUS RESULT: SIGNIFICANT CHANGE UP
BUN SERPL-MCNC: 37 MG/DL — HIGH (ref 7–23)
BUN SERPL-MCNC: 40 MG/DL — HIGH (ref 7–23)
BUN SERPL-MCNC: 40 MG/DL — HIGH (ref 7–23)
BUN SERPL-MCNC: 41 MG/DL — HIGH (ref 7–23)
CA-I BLD-SCNC: 1.09 MMOL/L — LOW (ref 1.15–1.29)
CA-I BLD-SCNC: 1.11 MMOL/L — LOW (ref 1.15–1.29)
CALCIUM SERPL-MCNC: 8.3 MG/DL — LOW (ref 8.4–10.5)
CALCIUM SERPL-MCNC: 8.4 MG/DL — SIGNIFICANT CHANGE UP (ref 8.4–10.5)
CALCIUM SERPL-MCNC: 8.5 MG/DL — SIGNIFICANT CHANGE UP (ref 8.4–10.5)
CALCIUM SERPL-MCNC: 8.5 MG/DL — SIGNIFICANT CHANGE UP (ref 8.4–10.5)
CHLORIDE SERPL-SCNC: 105 MMOL/L — SIGNIFICANT CHANGE UP (ref 98–107)
CHLORIDE SERPL-SCNC: 106 MMOL/L — SIGNIFICANT CHANGE UP (ref 98–107)
CHLORIDE SERPL-SCNC: 107 MMOL/L — SIGNIFICANT CHANGE UP (ref 98–107)
CHLORIDE SERPL-SCNC: 109 MMOL/L — HIGH (ref 98–107)
CO2 SERPL-SCNC: 27 MMOL/L — SIGNIFICANT CHANGE UP (ref 22–31)
CO2 SERPL-SCNC: 31 MMOL/L — SIGNIFICANT CHANGE UP (ref 22–31)
CO2 SERPL-SCNC: 32 MMOL/L — HIGH (ref 22–31)
CO2 SERPL-SCNC: 34 MMOL/L — HIGH (ref 22–31)
CREAT SERPL-MCNC: 0.78 MG/DL — SIGNIFICANT CHANGE UP (ref 0.5–1.3)
CREAT SERPL-MCNC: 0.81 MG/DL — SIGNIFICANT CHANGE UP (ref 0.5–1.3)
CREAT SERPL-MCNC: 0.82 MG/DL — SIGNIFICANT CHANGE UP (ref 0.5–1.3)
CREAT SERPL-MCNC: 0.96 MG/DL — SIGNIFICANT CHANGE UP (ref 0.5–1.3)
EGFR: 104 ML/MIN/1.73M2 — SIGNIFICANT CHANGE UP
EGFR: 104 ML/MIN/1.73M2 — SIGNIFICANT CHANGE UP
EGFR: 116 ML/MIN/1.73M2 — SIGNIFICANT CHANGE UP
EGFR: 118 ML/MIN/1.73M2 — SIGNIFICANT CHANGE UP
EGFR: 118 ML/MIN/1.73M2 — SIGNIFICANT CHANGE UP
EOSINOPHIL # BLD AUTO: 0 K/UL — SIGNIFICANT CHANGE UP (ref 0–0.5)
EOSINOPHIL # BLD AUTO: 0 K/UL — SIGNIFICANT CHANGE UP (ref 0–0.5)
EOSINOPHIL # BLD AUTO: 0.01 K/UL — SIGNIFICANT CHANGE UP (ref 0–0.5)
EOSINOPHIL # BLD AUTO: 0.01 K/UL — SIGNIFICANT CHANGE UP (ref 0–0.5)
EOSINOPHIL NFR BLD AUTO: 0 % — SIGNIFICANT CHANGE UP (ref 0–6)
EOSINOPHIL NFR BLD AUTO: 0 % — SIGNIFICANT CHANGE UP (ref 0–6)
EOSINOPHIL NFR BLD AUTO: 0.1 % — SIGNIFICANT CHANGE UP (ref 0–6)
EOSINOPHIL NFR BLD AUTO: 0.1 % — SIGNIFICANT CHANGE UP (ref 0–6)
GLUCOSE BLDC GLUCOMTR-MCNC: 133 MG/DL — HIGH (ref 70–99)
GLUCOSE BLDC GLUCOMTR-MCNC: 154 MG/DL — HIGH (ref 70–99)
GLUCOSE BLDC GLUCOMTR-MCNC: 172 MG/DL — HIGH (ref 70–99)
GLUCOSE BLDC GLUCOMTR-MCNC: 181 MG/DL — HIGH (ref 70–99)
GLUCOSE SERPL-MCNC: 169 MG/DL — HIGH (ref 70–99)
GLUCOSE SERPL-MCNC: 183 MG/DL — HIGH (ref 70–99)
GLUCOSE SERPL-MCNC: 188 MG/DL — HIGH (ref 70–99)
GLUCOSE SERPL-MCNC: 216 MG/DL — HIGH (ref 70–99)
HAPTOGLOB SERPL-MCNC: <20 MG/DL — LOW (ref 34–200)
HCT VFR BLD CALC: 41 % — SIGNIFICANT CHANGE UP (ref 39–50)
HCT VFR BLD CALC: 41.1 % — SIGNIFICANT CHANGE UP (ref 39–50)
HCT VFR BLD CALC: 42.5 % — SIGNIFICANT CHANGE UP (ref 39–50)
HCT VFR BLD CALC: 44.6 % — SIGNIFICANT CHANGE UP (ref 39–50)
HEPARIN-PF4 AB RESULT: <0.6 U/ML — SIGNIFICANT CHANGE UP (ref 0–0.9)
HGB BLD-MCNC: 11.6 G/DL — LOW (ref 13–17)
HGB BLD-MCNC: 11.7 G/DL — LOW (ref 13–17)
HGB BLD-MCNC: 12 G/DL — LOW (ref 13–17)
HGB BLD-MCNC: 12.1 G/DL — LOW (ref 13–17)
IANC: 12.96 K/UL — HIGH (ref 1.8–7.4)
IANC: 13.55 K/UL — HIGH (ref 1.8–7.4)
IANC: 14.84 K/UL — HIGH (ref 1.8–7.4)
IANC: 15.76 K/UL — HIGH (ref 1.8–7.4)
IMM GRANULOCYTES NFR BLD AUTO: 0.5 % — SIGNIFICANT CHANGE UP (ref 0–0.9)
IMM GRANULOCYTES NFR BLD AUTO: 0.5 % — SIGNIFICANT CHANGE UP (ref 0–0.9)
IMM GRANULOCYTES NFR BLD AUTO: 0.6 % — SIGNIFICANT CHANGE UP (ref 0–0.9)
IMM GRANULOCYTES NFR BLD AUTO: 0.8 % — SIGNIFICANT CHANGE UP (ref 0–0.9)
INR BLD: 2.2 RATIO — HIGH (ref 0.85–1.16)
INR BLD: 2.35 RATIO — HIGH (ref 0.85–1.16)
INR BLD: 2.36 RATIO — HIGH (ref 0.85–1.16)
INR BLD: 2.38 RATIO — HIGH (ref 0.85–1.16)
LDH SERPL L TO P-CCNC: 530 U/L — HIGH (ref 135–225)
LDH SERPL L TO P-CCNC: 643 U/L — HIGH (ref 135–225)
LYMPHOCYTES # BLD AUTO: 0.49 K/UL — LOW (ref 1–3.3)
LYMPHOCYTES # BLD AUTO: 0.58 K/UL — LOW (ref 1–3.3)
LYMPHOCYTES # BLD AUTO: 0.71 K/UL — LOW (ref 1–3.3)
LYMPHOCYTES # BLD AUTO: 1.3 K/UL — SIGNIFICANT CHANGE UP (ref 1–3.3)
LYMPHOCYTES # BLD AUTO: 3 % — LOW (ref 13–44)
LYMPHOCYTES # BLD AUTO: 3.8 % — LOW (ref 13–44)
LYMPHOCYTES # BLD AUTO: 4.8 % — LOW (ref 13–44)
LYMPHOCYTES # BLD AUTO: 7 % — LOW (ref 13–44)
MAGNESIUM SERPL-MCNC: 2.2 MG/DL — SIGNIFICANT CHANGE UP (ref 1.6–2.6)
MCHC RBC-ENTMCNC: 22.5 PG — LOW (ref 27–34)
MCHC RBC-ENTMCNC: 22.7 PG — LOW (ref 27–34)
MCHC RBC-ENTMCNC: 22.8 PG — LOW (ref 27–34)
MCHC RBC-ENTMCNC: 22.8 PG — LOW (ref 27–34)
MCHC RBC-ENTMCNC: 27.1 G/DL — LOW (ref 32–36)
MCHC RBC-ENTMCNC: 28.2 G/DL — LOW (ref 32–36)
MCHC RBC-ENTMCNC: 28.3 G/DL — LOW (ref 32–36)
MCHC RBC-ENTMCNC: 28.5 G/DL — LOW (ref 32–36)
MCV RBC AUTO: 80 FL — SIGNIFICANT CHANGE UP (ref 80–100)
MCV RBC AUTO: 80.4 FL — SIGNIFICANT CHANGE UP (ref 80–100)
MCV RBC AUTO: 80.8 FL — SIGNIFICANT CHANGE UP (ref 80–100)
MCV RBC AUTO: 83.1 FL — SIGNIFICANT CHANGE UP (ref 80–100)
MONOCYTES # BLD AUTO: 0.92 K/UL — HIGH (ref 0–0.9)
MONOCYTES # BLD AUTO: 1.05 K/UL — HIGH (ref 0–0.9)
MONOCYTES # BLD AUTO: 1.12 K/UL — HIGH (ref 0–0.9)
MONOCYTES # BLD AUTO: 1.42 K/UL — HIGH (ref 0–0.9)
MONOCYTES NFR BLD AUTO: 6.1 % — SIGNIFICANT CHANGE UP (ref 2–14)
MONOCYTES NFR BLD AUTO: 6.4 % — SIGNIFICANT CHANGE UP (ref 2–14)
MONOCYTES NFR BLD AUTO: 7.5 % — SIGNIFICANT CHANGE UP (ref 2–14)
MONOCYTES NFR BLD AUTO: 7.6 % — SIGNIFICANT CHANGE UP (ref 2–14)
NEUTROPHILS # BLD AUTO: 12.96 K/UL — HIGH (ref 1.8–7.4)
NEUTROPHILS # BLD AUTO: 13.55 K/UL — HIGH (ref 1.8–7.4)
NEUTROPHILS # BLD AUTO: 14.84 K/UL — HIGH (ref 1.8–7.4)
NEUTROPHILS # BLD AUTO: 15.76 K/UL — HIGH (ref 1.8–7.4)
NEUTROPHILS NFR BLD AUTO: 84.5 % — HIGH (ref 43–77)
NEUTROPHILS NFR BLD AUTO: 87 % — HIGH (ref 43–77)
NEUTROPHILS NFR BLD AUTO: 89.3 % — HIGH (ref 43–77)
NEUTROPHILS NFR BLD AUTO: 90 % — HIGH (ref 43–77)
NRBC # BLD AUTO: 0 K/UL — SIGNIFICANT CHANGE UP (ref 0–0)
NRBC # BLD AUTO: 0.02 K/UL — HIGH (ref 0–0)
NRBC # FLD: 0 K/UL — SIGNIFICANT CHANGE UP (ref 0–0)
NRBC # FLD: 0.02 K/UL — HIGH (ref 0–0)
NRBC BLD AUTO-RTO: 0 /100 WBCS — SIGNIFICANT CHANGE UP (ref 0–0)
PF4 HEPARIN CMPLX AB SER-ACNC: NEGATIVE — SIGNIFICANT CHANGE UP
PHOSPHATE SERPL-MCNC: 3.2 MG/DL — SIGNIFICANT CHANGE UP (ref 2.5–4.5)
PHOSPHATE SERPL-MCNC: 3.8 MG/DL — SIGNIFICANT CHANGE UP (ref 2.5–4.5)
PHOSPHATE SERPL-MCNC: 3.9 MG/DL — SIGNIFICANT CHANGE UP (ref 2.5–4.5)
PLATELET # BLD AUTO: 115 K/UL — LOW (ref 150–400)
PLATELET # BLD AUTO: 119 K/UL — LOW (ref 150–400)
PLATELET # BLD AUTO: 123 K/UL — LOW (ref 150–400)
PLATELET # BLD AUTO: 131 K/UL — LOW (ref 150–400)
POTASSIUM SERPL-MCNC: 3.7 MMOL/L — SIGNIFICANT CHANGE UP (ref 3.5–5.3)
POTASSIUM SERPL-MCNC: 3.7 MMOL/L — SIGNIFICANT CHANGE UP (ref 3.5–5.3)
POTASSIUM SERPL-MCNC: 3.9 MMOL/L — SIGNIFICANT CHANGE UP (ref 3.5–5.3)
POTASSIUM SERPL-MCNC: 4.1 MMOL/L — SIGNIFICANT CHANGE UP (ref 3.5–5.3)
POTASSIUM SERPL-SCNC: 3.7 MMOL/L — SIGNIFICANT CHANGE UP (ref 3.5–5.3)
POTASSIUM SERPL-SCNC: 3.7 MMOL/L — SIGNIFICANT CHANGE UP (ref 3.5–5.3)
POTASSIUM SERPL-SCNC: 3.9 MMOL/L — SIGNIFICANT CHANGE UP (ref 3.5–5.3)
POTASSIUM SERPL-SCNC: 4.1 MMOL/L — SIGNIFICANT CHANGE UP (ref 3.5–5.3)
PROT SERPL-MCNC: 6.3 G/DL — SIGNIFICANT CHANGE UP (ref 6–8.3)
PROT SERPL-MCNC: 6.3 G/DL — SIGNIFICANT CHANGE UP (ref 6–8.3)
PROT SERPL-MCNC: 6.4 G/DL — SIGNIFICANT CHANGE UP (ref 6–8.3)
PROT SERPL-MCNC: 6.7 G/DL — SIGNIFICANT CHANGE UP (ref 6–8.3)
PROTHROM AB SERPL-ACNC: 25.3 SEC — HIGH (ref 9.9–13.4)
PROTHROM AB SERPL-ACNC: 27 SEC — HIGH (ref 9.9–13.4)
PROTHROM AB SERPL-ACNC: 27.8 SEC — HIGH (ref 9.9–13.4)
PROTHROM AB SERPL-ACNC: 28.1 SEC — HIGH (ref 9.9–13.4)
RBC # BLD: 5.1 M/UL — SIGNIFICANT CHANGE UP (ref 4.2–5.8)
RBC # BLD: 5.14 M/UL — SIGNIFICANT CHANGE UP (ref 4.2–5.8)
RBC # BLD: 5.26 M/UL — SIGNIFICANT CHANGE UP (ref 4.2–5.8)
RBC # BLD: 5.37 M/UL — SIGNIFICANT CHANGE UP (ref 4.2–5.8)
RBC # FLD: 20.8 % — HIGH (ref 10.3–14.5)
RBC # FLD: 20.8 % — HIGH (ref 10.3–14.5)
RBC # FLD: 20.9 % — HIGH (ref 10.3–14.5)
RBC # FLD: 21.1 % — HIGH (ref 10.3–14.5)
SODIUM SERPL-SCNC: 146 MMOL/L — HIGH (ref 135–145)
SODIUM SERPL-SCNC: 146 MMOL/L — HIGH (ref 135–145)
SODIUM SERPL-SCNC: 147 MMOL/L — HIGH (ref 135–145)
SODIUM SERPL-SCNC: 147 MMOL/L — HIGH (ref 135–145)
TRIGL SERPL-MCNC: 147 MG/DL — SIGNIFICANT CHANGE UP
WBC # BLD: 14.9 K/UL — HIGH (ref 3.8–10.5)
WBC # BLD: 15.16 K/UL — HIGH (ref 3.8–10.5)
WBC # BLD: 16.48 K/UL — HIGH (ref 3.8–10.5)
WBC # BLD: 18.64 K/UL — HIGH (ref 3.8–10.5)
WBC # FLD AUTO: 14.9 K/UL — HIGH (ref 3.8–10.5)
WBC # FLD AUTO: 15.16 K/UL — HIGH (ref 3.8–10.5)
WBC # FLD AUTO: 16.48 K/UL — HIGH (ref 3.8–10.5)
WBC # FLD AUTO: 18.64 K/UL — HIGH (ref 3.8–10.5)

## 2025-03-04 PROCEDURE — 71045 X-RAY EXAM CHEST 1 VIEW: CPT | Mod: 26

## 2025-03-04 PROCEDURE — 99291 CRITICAL CARE FIRST HOUR: CPT | Mod: 25

## 2025-03-04 PROCEDURE — 99292 CRITICAL CARE ADDL 30 MIN: CPT | Mod: 25

## 2025-03-04 PROCEDURE — 99223 1ST HOSP IP/OBS HIGH 75: CPT

## 2025-03-04 PROCEDURE — 33948 ECMO/ECLS DAILY MGMT-VENOUS: CPT

## 2025-03-04 RX ORDER — ALBUMIN (HUMAN) 12.5 G/50ML
250 INJECTION, SOLUTION INTRAVENOUS ONCE
Refills: 0 | Status: DISCONTINUED | OUTPATIENT
Start: 2025-03-04 | End: 2025-03-04

## 2025-03-04 RX ORDER — QUETIAPINE FUMARATE 25 MG/1
25 TABLET ORAL EVERY 12 HOURS
Refills: 0 | Status: DISCONTINUED | OUTPATIENT
Start: 2025-03-04 | End: 2025-03-06

## 2025-03-04 RX ORDER — LACTULOSE 10 G/15ML
10 SOLUTION ORAL
Refills: 0 | Status: COMPLETED | OUTPATIENT
Start: 2025-03-04 | End: 2025-03-05

## 2025-03-04 RX ORDER — FUROSEMIDE 10 MG/ML
40 INJECTION INTRAMUSCULAR; INTRAVENOUS EVERY 12 HOURS
Refills: 0 | Status: DISCONTINUED | OUTPATIENT
Start: 2025-03-04 | End: 2025-03-05

## 2025-03-04 RX ORDER — FUROSEMIDE 10 MG/ML
5 INJECTION INTRAMUSCULAR; INTRAVENOUS
Qty: 500 | Refills: 0 | Status: DISCONTINUED | OUTPATIENT
Start: 2025-03-04 | End: 2025-03-04

## 2025-03-04 RX ORDER — ARGATROBAN 100 MG/ML
1.1 INJECTION, SOLUTION INTRAVENOUS
Qty: 250 | Refills: 0 | Status: DISCONTINUED | OUTPATIENT
Start: 2025-03-04 | End: 2025-03-08

## 2025-03-04 RX ORDER — ACETAZOLAMIDE 250 MG/1
500 TABLET ORAL ONCE
Refills: 0 | Status: COMPLETED | OUTPATIENT
Start: 2025-03-04 | End: 2025-03-04

## 2025-03-04 RX ORDER — ALBUMIN (HUMAN) 12.5 G/50ML
250 INJECTION, SOLUTION INTRAVENOUS ONCE
Refills: 0 | Status: COMPLETED | OUTPATIENT
Start: 2025-03-04 | End: 2025-03-04

## 2025-03-04 RX ORDER — MINERAL OIL
133 OIL (ML) MISCELLANEOUS ONCE
Refills: 0 | Status: COMPLETED | OUTPATIENT
Start: 2025-03-04 | End: 2025-03-04

## 2025-03-04 RX ORDER — METOCLOPRAMIDE HCL 10 MG
10 TABLET ORAL ONCE
Refills: 0 | Status: COMPLETED | OUTPATIENT
Start: 2025-03-04 | End: 2025-03-04

## 2025-03-04 RX ORDER — HYDROMORPHONE/SOD CHLOR,ISO/PF 2 MG/10 ML
1 SYRINGE (ML) INJECTION ONCE
Refills: 0 | Status: DISCONTINUED | OUTPATIENT
Start: 2025-03-04 | End: 2025-03-04

## 2025-03-04 RX ORDER — METHYLNALTREXONE BROMIDE 12 MG/.6ML
18 INJECTION, SOLUTION SUBCUTANEOUS ONCE
Refills: 0 | Status: COMPLETED | OUTPATIENT
Start: 2025-03-04 | End: 2025-03-04

## 2025-03-04 RX ORDER — LACTULOSE 10 G/15ML
10 SOLUTION ORAL
Refills: 0 | Status: DISCONTINUED | OUTPATIENT
Start: 2025-03-04 | End: 2025-03-04

## 2025-03-04 RX ADMIN — Medication 1 APPLICATION(S): at 05:14

## 2025-03-04 RX ADMIN — Medication 1 MG/HR: at 07:37

## 2025-03-04 RX ADMIN — POLYETHYLENE GLYCOL 3350 17 GRAM(S): 17 POWDER, FOR SOLUTION ORAL at 05:11

## 2025-03-04 RX ADMIN — FUROSEMIDE 40 MILLIGRAM(S): 10 INJECTION INTRAMUSCULAR; INTRAVENOUS at 17:02

## 2025-03-04 RX ADMIN — Medication 10 MILLIGRAM(S): at 10:26

## 2025-03-04 RX ADMIN — IPRATROPIUM BROMIDE AND ALBUTEROL SULFATE 3 MILLILITER(S): .5; 2.5 SOLUTION RESPIRATORY (INHALATION) at 08:32

## 2025-03-04 RX ADMIN — Medication 40 MILLIEQUIVALENT(S): at 12:14

## 2025-03-04 RX ADMIN — PROPOFOL 36.1 MICROGRAM(S)/KG/MIN: 10 INJECTION, EMULSION INTRAVENOUS at 21:25

## 2025-03-04 RX ADMIN — Medication 1 MILLIGRAM(S): at 08:15

## 2025-03-04 RX ADMIN — Medication 15 MILLILITER(S): at 05:13

## 2025-03-04 RX ADMIN — ACETAZOLAMIDE 500 MILLIGRAM(S): 250 TABLET ORAL at 13:32

## 2025-03-04 RX ADMIN — DEXMEDETOMIDINE HYDROCHLORIDE IN SODIUM CHLORIDE 60.2 MICROGRAM(S)/KG/HR: 4 INJECTION INTRAVENOUS at 21:25

## 2025-03-04 RX ADMIN — DEXMEDETOMIDINE HYDROCHLORIDE IN SODIUM CHLORIDE 60.2 MICROGRAM(S)/KG/HR: 4 INJECTION INTRAVENOUS at 07:35

## 2025-03-04 RX ADMIN — Medication 25 GRAM(S): at 21:26

## 2025-03-04 RX ADMIN — Medication 4 MILLILITER(S): at 08:32

## 2025-03-04 RX ADMIN — PROPOFOL 36.1 MICROGRAM(S)/KG/MIN: 10 INJECTION, EMULSION INTRAVENOUS at 15:43

## 2025-03-04 RX ADMIN — IPRATROPIUM BROMIDE AND ALBUTEROL SULFATE 3 MILLILITER(S): .5; 2.5 SOLUTION RESPIRATORY (INHALATION) at 21:42

## 2025-03-04 RX ADMIN — Medication 40 MILLIGRAM(S): at 11:23

## 2025-03-04 RX ADMIN — Medication 15 MILLILITER(S): at 11:23

## 2025-03-04 RX ADMIN — PROPOFOL 36.1 MICROGRAM(S)/KG/MIN: 10 INJECTION, EMULSION INTRAVENOUS at 19:19

## 2025-03-04 RX ADMIN — Medication 1 APPLICATION(S): at 17:03

## 2025-03-04 RX ADMIN — CLOTRIMAZOLE 1 APPLICATION(S): 1 CREAM TOPICAL at 17:13

## 2025-03-04 RX ADMIN — DEXMEDETOMIDINE HYDROCHLORIDE IN SODIUM CHLORIDE 60.2 MICROGRAM(S)/KG/HR: 4 INJECTION INTRAVENOUS at 10:38

## 2025-03-04 RX ADMIN — INSULIN LISPRO 2: 100 INJECTION, SOLUTION INTRAVENOUS; SUBCUTANEOUS at 11:41

## 2025-03-04 RX ADMIN — METHYLNALTREXONE BROMIDE 18 MILLIGRAM(S): 12 INJECTION, SOLUTION SUBCUTANEOUS at 11:28

## 2025-03-04 RX ADMIN — DEXAMETHASONE 102 MILLIGRAM(S): 0.5 TABLET ORAL at 05:11

## 2025-03-04 RX ADMIN — Medication 10 MILLILITER(S): at 17:02

## 2025-03-04 RX ADMIN — LACTULOSE 10 GRAM(S): 10 SOLUTION ORAL at 11:24

## 2025-03-04 RX ADMIN — PROPOFOL 36.1 MICROGRAM(S)/KG/MIN: 10 INJECTION, EMULSION INTRAVENOUS at 03:13

## 2025-03-04 RX ADMIN — Medication 50 MILLIEQUIVALENT(S): at 23:45

## 2025-03-04 RX ADMIN — LACTULOSE 10 GRAM(S): 10 SOLUTION ORAL at 14:44

## 2025-03-04 RX ADMIN — ARGATROBAN 15.8 MICROGRAM(S)/KG/MIN: 100 INJECTION, SOLUTION INTRAVENOUS at 07:36

## 2025-03-04 RX ADMIN — Medication 4 MILLIGRAM(S): at 08:28

## 2025-03-04 RX ADMIN — Medication 1 MILLIGRAM(S): at 08:35

## 2025-03-04 RX ADMIN — INSULIN LISPRO 2: 100 INJECTION, SOLUTION INTRAVENOUS; SUBCUTANEOUS at 23:16

## 2025-03-04 RX ADMIN — PROPOFOL 36.1 MICROGRAM(S)/KG/MIN: 10 INJECTION, EMULSION INTRAVENOUS at 23:56

## 2025-03-04 RX ADMIN — Medication 4 MILLILITER(S): at 03:16

## 2025-03-04 RX ADMIN — IPRATROPIUM BROMIDE AND ALBUTEROL SULFATE 3 MILLILITER(S): .5; 2.5 SOLUTION RESPIRATORY (INHALATION) at 03:16

## 2025-03-04 RX ADMIN — Medication 4 MILLILITER(S): at 15:43

## 2025-03-04 RX ADMIN — CLOTRIMAZOLE 1 APPLICATION(S): 1 CREAM TOPICAL at 05:14

## 2025-03-04 RX ADMIN — LACTULOSE 10 GRAM(S): 10 SOLUTION ORAL at 21:25

## 2025-03-04 RX ADMIN — ARGATROBAN 15.8 MICROGRAM(S)/KG/MIN: 100 INJECTION, SOLUTION INTRAVENOUS at 19:19

## 2025-03-04 RX ADMIN — QUETIAPINE FUMARATE 12.5 MILLIGRAM(S): 25 TABLET ORAL at 05:14

## 2025-03-04 RX ADMIN — INSULIN LISPRO 2: 100 INJECTION, SOLUTION INTRAVENOUS; SUBCUTANEOUS at 17:30

## 2025-03-04 RX ADMIN — Medication 25 GRAM(S): at 13:17

## 2025-03-04 RX ADMIN — Medication 1 MG/HR: at 19:20

## 2025-03-04 RX ADMIN — PROPOFOL 36.1 MICROGRAM(S)/KG/MIN: 10 INJECTION, EMULSION INTRAVENOUS at 10:26

## 2025-03-04 RX ADMIN — Medication 133 MILLILITER(S): at 16:05

## 2025-03-04 RX ADMIN — POLYETHYLENE GLYCOL 3350 17 GRAM(S): 17 POWDER, FOR SOLUTION ORAL at 17:02

## 2025-03-04 RX ADMIN — DEXMEDETOMIDINE HYDROCHLORIDE IN SODIUM CHLORIDE 60.2 MICROGRAM(S)/KG/HR: 4 INJECTION INTRAVENOUS at 19:19

## 2025-03-04 RX ADMIN — Medication 25 GRAM(S): at 06:12

## 2025-03-04 RX ADMIN — PROPOFOL 36.1 MICROGRAM(S)/KG/MIN: 10 INJECTION, EMULSION INTRAVENOUS at 07:36

## 2025-03-04 RX ADMIN — ALBUMIN (HUMAN) 1000 MILLILITER(S): 12.5 INJECTION, SOLUTION INTRAVENOUS at 10:03

## 2025-03-04 RX ADMIN — PROPOFOL 36.1 MICROGRAM(S)/KG/MIN: 10 INJECTION, EMULSION INTRAVENOUS at 18:19

## 2025-03-04 RX ADMIN — LACTULOSE 10 GRAM(S): 10 SOLUTION ORAL at 17:19

## 2025-03-04 RX ADMIN — Medication 15 MILLILITER(S): at 17:02

## 2025-03-04 RX ADMIN — LACTULOSE 10 GRAM(S): 10 SOLUTION ORAL at 23:45

## 2025-03-04 RX ADMIN — IPRATROPIUM BROMIDE AND ALBUTEROL SULFATE 3 MILLILITER(S): .5; 2.5 SOLUTION RESPIRATORY (INHALATION) at 15:42

## 2025-03-04 RX ADMIN — Medication 10 MILLILITER(S): at 05:11

## 2025-03-04 RX ADMIN — Medication 4 MILLILITER(S): at 21:42

## 2025-03-04 NOTE — DISCHARGE NOTE PROVIDER - DETAILS OF MALNUTRITION DIAGNOSIS/DIAGNOSES
This patient has been assessed with a concern for Malnutrition and was treated during this hospitalization for the following Nutrition diagnosis/diagnoses:     -  02/26/2025: Morbid obesity (BMI > 40)

## 2025-03-04 NOTE — DISCHARGE NOTE PROVIDER - HOSPITAL COURSE
40 YO M with PMHx of morbid obesity, BEA on CPAP, pAFIB (not on AC), HTN, and BL papilledema attributed to pseudotumor cerebri who presented initially to [facility] on 2/24 with SOB and BL LE edema. Found with URI outpatient with progressive SOB, and concern for noted for MFPNA on imaging. Course progressed with AHRF with worsening O2 demand, respiratory distress, secretions, and somnolence requiring intubation (difficult with success after 6 attempts) in ED and admission to [facility] MICU.     While in MICU, patient noted with increasing O2 demand with no improvement despite optimal vent management. Concern noted for progressive ARDS, unable to prone given morbid obesity, and ultimately cannulated for vvECMO on 2/25 and transferred to [facility] for further management.     While at [facility], POCUS noted with RVE and concern for pulmonary HTN given obesity. Diuresis started and continued on zosyn for MFPNA and cellulitis (abdominal pannus). BAL negative, however leukocytosis and fevers continued. Zosyn resumed. Patient ultimately decannulated and s/p [id number] trach and PEG on 3/7. Post ECMO dopplers on 3/9 negative for BL UE/ LE DVTs. Patient catie transferred to RCU on 3/11    While in RCU, subsequent post ECMO dopplers performed on 3/14 and noted with acute, non-occlusive deep vein thrombosis noted within the right internal jugular vein. acute, occlusive deep vein thromboses noted within the right and left peroneal veins at both mid calves, and age-indeterminate, occlusive deep vein thrombosis visualized within the left gastrocnemius vein at the proximal calf. 38 YO M with PMHx of morbid obesity, BEA on CPAP, pAFIB (not on AC), HTN, and BL papilledema attributed to pseudotumor cerebri who presented initially to [facility] on 2/24 with SOB and BL LE edema. Found with URI outpatient with progressive SOB, and concern for noted for MFPNA on imaging. Course progressed with AHRF with worsening O2 demand, respiratory distress, secretions, and somnolence requiring intubation (difficult with success after 6 attempts) in ED and admission to [facility] MICU. While in MICU, patient noted with increasing O2 demand with no improvement despite optimal vent management. Concern noted for progressive ARDS, unable to prone given morbid obesity, and ultimately cannulated for vvECMO on 2/25 and transferred to [facility] for further management on 2/26. While at [facility], tx with diuresis and ABX, and ultimately decannulated and s/p 60XLTCP trach and PEG on 3/7. Patient ultimately transferred to RCU on 3/11 and course complicated by recurrent high grade fevers and found with fungemia likely from panniculitis. Course further complicated by progression of sacrum ulcer with possible OM and s/p surgical debridement and now continued on extended antibiotic course. Lastly course complicated by BL feet pressor wound with podiatry recommendations for local wound care. During his course patient gradually improved. He was weaned off sedation. He was able to be decannulated and tolerated spontaneously breathing  on room air with continued nocturnal NIV use. He passed an SLP eval, was able to tolerate PO diet, and PEG was removed.  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 Course c/b ELLA 2/2 Vancomycin toxicity vs overdiuresis, both agents now discontinued with normalization of renal function. He was seen by PT and PMR with reecommendation for acute rehab. Remaining comorbidities managed as appropriate.    On 4/25/25, case d/w Dr. Olivia. Patient stable for DC today.

## 2025-03-04 NOTE — CONSULT NOTE ADULT - SUBJECTIVE AND OBJECTIVE BOX
*** SURGERY CONSULT NOTE    Consulting Team: Vascular Surgery     Patient: SAL CHILDERS , 39y ([birth year])Male   MRN: [id number]  Location: [place] [address]  Visit: 02-26-25 Inpatient  Date: 03-04-25 @ 21:24      HPI: 39M PMHx class III obesity, pAfib (no AC), HTN, BEA (on CPAP), pseudotumor cerebri s/p LP who is transferred for VV ECMO for ARDS and severe hypoxia. Patient initially presented to [place] on 2/24 for SOB and b/l LE edema where he was intubated but remained hypoxic requiring VV ECMO. While in MICU, patient noted to have b/l plantar blisters. Vascular consulted to rule out possible ischemic etiology.       PAST MEDICAL HISTORY:  HTN (hypertension)    Atrial fibrillation    Papilledema of both eyes    Chronic sinusitis    Morbid obesity        PAST SURGICAL HISTORY:  No significant past surgical history        MEDICATIONS:  albuterol/ipratropium for Nebulization 3 milliLiter(s) Nebulizer every 6 hours  argatroban Infusion 1.094 MICROgram(s)/kG/Min IV Continuous <Continuous>  chlorhexidine 0.12% Liquid 15 milliLiter(s) Oral Mucosa every 12 hours  chlorhexidine 2% Cloths 1 Application(s) Topical <User Schedule>  clotrimazole 1% Cream 1 Application(s) Topical two times a day  dexAMETHasone  IVPB 10 milliGRAM(s) IV Intermittent daily  dexMEDEtomidine Infusion 1 MICROgram(s)/kG/Hr IV Continuous <Continuous>  dextrose 50% Injectable 25 Gram(s) IV Push once  dextrose 50% Injectable 12.5 Gram(s) IV Push once  dextrose 50% Injectable 25 Gram(s) IV Push once  furosemide   Injectable 40 milliGRAM(s) IV Push every 12 hours  glucagon  Injectable 1 milliGRAM(s) IntraMuscular once  HYDROmorphone Infusion. 1 mG/Hr IV Continuous <Continuous>  insulin lispro (ADMELOG) corrective regimen sliding scale   SubCutaneous every 6 hours  lactulose Syrup 10 Gram(s) Oral every 3 hours  multivitamin/minerals/iron Oral Solution (CENTRUM) 15 milliLiter(s) Oral daily  pantoprazole  Injectable 40 milliGRAM(s) IV Push daily  petrolatum Ophthalmic Ointment 1 Application(s) Both EYES every 12 hours  piperacillin/tazobactam IVPB.. 4.5 Gram(s) IV Intermittent every 8 hours  polyethylene glycol 3350 17 Gram(s) Oral two times a day  propofol Infusion 25 MICROgram(s)/kG/Min IV Continuous <Continuous>  QUEtiapine 25 milliGRAM(s) Oral every 12 hours  senna Syrup 10 milliLiter(s) Oral two times a day  sodium chloride 3%  Inhalation 4 milliLiter(s) Inhalation every 6 hours      ALLERGIES:  No Known Allergies      VITALS & I/Os:  Vital Signs Last 24 Hrs  T(C): 35.8 (04 Mar 2025 21:00), Max: 36.2 (03 Mar 2025 22:00)  T(F): 96.4 (04 Mar 2025 21:00), Max: 97.2 (03 Mar 2025 22:00)  HR: 57 (04 Mar 2025 21:00) (53 - 101)  BP: --  BP(mean): --  RR: 14 (04 Mar 2025 21:00) (14 - 19)  SpO2: 100% (04 Mar 2025 21:00) (89% - 100%)    Parameters below as of 04 Mar 2025 21:00  Patient On (Oxygen Delivery Method): ventilator      Mode: AC/ CMV (Assist Control/ Continuous Mandatory Ventilation)  RR (machine): 14  FiO2: 40  PEEP: 22  ITime: 1  MAP: 25  PC: 12  PIP: 34    I&O's Summary    03 Mar 2025 07:01  -  04 Mar 2025 07:00  --------------------------------------------------------  IN: 4844.1 mL / OUT: 9900 mL / NET: -5055.9 mL    04 Mar 2025 07:01  -  04 Mar 2025 21:24  --------------------------------------------------------  IN: 3428.4 mL / OUT: 4435 mL / NET: -1006.6 mL        PHYSICAL EXAM:  General: sedated  Respiratory: intubated   Extremities: WWP, b/l LE edema, b/l plantar blister noted without evidence of infection, +ds DP/PT b/l, groins soft, R fem ECMO cannula c/d/i    LABS:                        11.7   15.16 )-----------( 123      ( 04 Mar 2025 15:49 )             41.1     03-04    146[H]  |  107  |  40[H]  ----------------------------<  216[H]  3.9   |  31  |  0.81    Ca    8.5      04 Mar 2025 15:49  Phos  3.9     03-04  Mg     2.20     03-04    TPro  6.3  /  Alb  3.0[L]  /  TBili  1.0  /  DBili  x   /  AST  60[H]  /  ALT  114[H]  /  AlkPhos  57  03-04    Lactate:    PT/INR - ( 04 Mar 2025 15:49 )   PT: 27.0 sec;   INR: 2.35 ratio         PTT - ( 04 Mar 2025 15:49 )  PTT:61.0 sec  ABG - ( 04 Mar 2025 20:12 )  pH, Arterial: 7.38  pH, Blood: x     /  pCO2: 59    /  pO2: 126   / HCO3: 35    / Base Excess: 8.0   /  SaO2: 99.4                    Urinalysis Basic - ( 04 Mar 2025 15:49 )    Color: x / Appearance: x / SG: x / pH: x  Gluc: 216 mg/dL / Ketone: x  / Bili: x / Urobili: x   Blood: x / Protein: x / Nitrite: x   Leuk Esterase: x / RBC: x / WBC x   Sq Epi: x / Non Sq Epi: x / Bacteria: x          IMAGING:

## 2025-03-04 NOTE — CHART NOTE - NSCHARTNOTEFT_GEN_A_CORE
Called by NP to evaluate cannulas, they appeared to be loose and when the patient was waking up they were moving.   After inspection, the neck cannula was loose due to the large amount of weight the patient lost via diuresis.   The femoral cannula was secured well.  The neck cannula needed to be secured better so the patient was given 2% lidocaine and two new sutures were placed to secure both the neck and femoral cannulas.   Prepped and draped as per standard, after sutures were placed central line dressings were used to dress the cannulas.

## 2025-03-04 NOTE — PROGRESS NOTE ADULT - ASSESSMENT
40 yo M w/ class III obesity, pAfib (no AC), HTN, BEA (on CPAP), history of b/l papilledema attributed to pseudotumor cerebri, who is transferred from [place] on 2/26 for VV ECMO 2/2 ARDS. Patient initially presented to [place] on 2/24 for SOB and b/l LE edema. As per chart review, patient endorses some degree of SOB and b/l LE edema x 3 months with significant weight gain. As per ICU team, patient had positive sick contacts with viral URI 1 to 2 weeks PTA and since then has had worsening SOB. Patient found to be reportedly hypoxemic to 70% on RA with worsening respiratory status/secretions and somnolence in the ED. Patient was a difficult intubation (~6 attempts). Despite optimal vent management, patient remained hypoxemic. Unable to prone due to obesity. Patient cannulated for VV ECMO on 2/25 and transferred to [facility] for further management.     Neuro  #Mental status   #pseudotumor cerebri  - history of pseudotumor cerebri s/p LP under fluoro in 2020 with high opening pressure with MRI 2020 also showed possible pituitary mass but unclear because of pressure effect from pseudotumor cerebri causing partially empty sella. Considering to start acetazolamide but never started  - prolactin high (30), defer checking cortisol axis given already received steroids but should be investigated, ACTH (<1.5) low but should be suppressed iso steroids  - concern for pituitary adenoma, however TSH .79 normal,  fT4 0.9 normal, and low T3 67  - sedated with Dilaudid, low dose propofol, and precedex gtt. Goal to wean dilaudid slightly today as tolerated   - seroquel 25mg BID held overnight iso high QTc now 465 will change to 12.5 BID for now and increase as tolerated   - paralytics d/c'd 2/27   - PT/OT following     Cardiovascular  #HTN   #Atrial fibrillation   - Hx of a. fib not on AC   - echo from 2024 did not demonstrate elevation of pulmonary pressures, but now with severe PH suggesting chronicity on TTE at    - TTE on 2/25 showed LV EF 61%, enlarged RV with TAPSE 2.1cm, ePASP at least 49 (Patient had 10/2024 TTE with normal LV and RV with ePASP 14).  - ROBERT 2/26 showing VTI 17  - ROBERT 3/1 showing mild TR, enlarged RV with normal LV/RV function. PEEP increased to 24 without signs of RV dilation  - troponins initially elevated, peaked at 162 however downtrended   - s/p esmolol drip for high flows now D/C'd may need to consider again if need for higher flows  - cardene gtt started for hypertension [id number] currently on hold this AM   - ECG daily to monitor QTc currently 465    Pulmonary  #Pulmonary HTN  #ARDS  #Multifocal Pneumonia   #BEA   - history of diagnosed BEA/?OHS intermittent compliance to home PAP  - c/f acute on suspected chronic pulmonary hypertension in setting of possible BEA/OHS c/b pneumonia, ARDS, and pulmonary edema   - CTA chest on 2/24 negative for pulmonary embolism, notable for patchy bilateral lower lobe opacities, hepatomegaly, mild ascites, edema/induration of the abdominal pannus.  - Full RVP and BAL cultures negative    - Bronchoscopy demonstrated copious purulent secretions 2/26 and 2/27  - Bronchoscopy 3/1 noted with small amount of old, dried blood at R and L mainstem, not occluding airway.  BAL NGTD  - s/p Dexamethasone 20mg x 5 days, now on 10 mg x5 days and taper as per clinical status pneumonia/ARDS  - C/w duoNeb q6 hours + 3% saline and IPV   - c/w PC/AC PIP:30  RR 14 PC: 12 PEEP:24 Fio2 40% ~380-480  - difficult glottic visualization due to large tongue upon intubation at [facility] 2/25  -ETT migrated x2 most likely post IPV, needed to be pushed in via bronch, will watch closely (should be 28 @lip)    #ECMO  VV ECMO		  Cannulation sites/dates:  Flow: 4.86	       P1: 195		Delta P: 38  RPM: 3300	       P2: 157		SVO2: 76  Sweep: 1.5 L/min:		            FIO2: 0.8      - was requiring high flows for oxygenation causing high delta P but D/V adequate, no lactate and SVO2 WNL with some hemolysis but now resolving   - Clinically perfusing. Lactate: 1.8  - ECMO sweep sighing q1hr   - Adjust circuit flow as tolerated with DO2:VO2 goal >2  - Monitor for hemolysis, chatter, evidence of recirculation, mixing      Gastrointestinal  #Diet   #Transaminitis  - tolerating tube feeds  - c/w bowel regimen   - elevated Tbili likely iso hemolysis 2/2 high ECMO flows, mild transaminitis now improving  - hepatomegaly and mild ascites noted on CT A/P - no pocket to tap  - RUQ ultrasound with steatosis    - c/w bowel regimen with Senna and Miralax. S/p Relistor now x 4 doses, also gave dose of magnesium citrate  - triglycerides 146  - nutrition following       Gu/Renal  #ELLA  - ELLA - improved, likely ATN/vascular congestion  - good UO, overall net negative  - s/p intermittent diuresis, started lasix gtt 3/2 to assist with aggressive diuresis, currently at 10mg/h will monitor urine and electrolytes closely     Infectious disease  #leukocytosis  #Pneumonia   #cellulitis   - Leukocytosis now likely iso steroids, remains afebrile  - s/p cefepime, linezolid, and aztihro 2/25-2/26   - s/p vanco (2/26-3/1) and c/w zosyn for a total of ~10 days (2/26-3/8) to cover cellulitis   - full RVP negative  - BCx x 2 2/24 and 2/26 currently NGTD, UCx 2/25 NGTD, tracheal aspirate Cx from 2/25 with normal commensal kyle   - strep pneumo Ag and urine legionella negative   - Mupiricon b/l nares for +MSSA x 5 days (2/26-  )  - BAL cultures 3/1 currently NGTD, previous BAL 2/26 NGTD  - ? penile meatus yellow discharge, GC/chamydia and HIV negative   - candidal intertrigo +/- cellulitis of the pannus - clotrimazole cream 1% BID 2/26 -+ abx as per above    Hematology/DVT ppx  #Anticoagulation   #Thrombocytopenia  #Anemia   - Hb stable 12.4, platelets downtrending likely r/t shearing 2/2 ECMO circuit  - s/p 1 u PRBC 2/26 for O2 delivery  - c/w argatroban with goal 50-70  - LE duplex 2/25 neg    Endocrine  #DM  -A1c 6.7   - obesity with metabolic syndrome  - diabetes with steroid induced hypoglycemia  - c/w decadron and taper iso ARDS  - pituitary workup, steroids as above given recent weight gain but likely volume though  - c/w FS Q6h and moderate ISS, may need to adjust as tapering steroids    SKin/Lines  #Access  #Cellulitis   #DTI  - L subclavian TLC inserted 2/25   - L radial artery line 2/25  - RIJ ECMO cannula 2/26 at 20 cm  - R fem ECMO cannula 2/26 at 25 cm  - candidal intertrigo +/- cellulitis of the pannus - clotrimazole cream 1% BID 2/26 -+ abx as per above  - as per nursing staff, patient with DTI to right buttocks and B/L feet   - wound consult placed     GOC   - full code   - palliative involved  and following while on ECMO   - Mother involved in care and life partner      38 yo M w/ class III obesity, pAfib (no AC), HTN, BEA (on CPAP), history of b/l papilledema attributed to pseudotumor cerebri, who is transferred from [place] on 2/26 for VV ECMO 2/2 ARDS. Patient initially presented to [place] on 2/24 for SOB and b/l LE edema. As per chart review, patient endorses some degree of SOB and b/l LE edema x 3 months with significant weight gain. As per ICU team, patient had positive sick contacts with viral URI 1 to 2 weeks PTA and since then has had worsening SOB. Patient found to be reportedly hypoxemic to 70% on RA with worsening respiratory status/secretions and somnolence in the ED. Patient was a difficult intubation (~6 attempts). Despite optimal vent management, patient remained hypoxemic. Unable to prone due to obesity. Patient cannulated for VV ECMO on 2/25 and transferred to [facility] for further management.     Neuro  #Mental status   #pseudotumor cerebri  - history of pseudotumor cerebri s/p LP under fluoro in 2020 with high opening pressure with MRI 2020 also showed possible pituitary mass but unclear because of pressure effect from pseudotumor cerebri causing partially empty sella. Considering to start acetazolamide but never started  - prolactin high (30), defer checking cortisol axis given already received steroids but should be investigated, ACTH (<1.5) low but should be suppressed iso steroids  - concern for pituitary adenoma, however TSH .79 normal,  fT4 0.9 normal, and low T3 67  - sedated with Dilaudid, low dose propofol, and precedex gtt. Goal to wean dilaudid slightly today as tolerated   - with lowered sedation pt awake, EASLEY, able to follow commands, however became restless pulling at lines and ETT, received versed and dilaudid IVP  - seroquel 25mg BID held overnight iso high QTc now 465 will change to 12.5 BID for now and increase as tolerated   - paralytics d/c'd 2/27   - PT/OT following     Cardiovascular  #HTN   #Atrial fibrillation   - Hx of a. fib not on AC   - echo from 2024 did not demonstrate elevation of pulmonary pressures, but now with severe PH suggesting chronicity on TTE at    - TTE on 2/25 showed LV EF 61%, enlarged RV with TAPSE 2.1cm, ePASP at least 49 (Patient had 10/2024 TTE with normal LV and RV with ePASP 14).  - ROBERT 2/26 showing VTI 17  - ROBERT 3/1 showing mild TR, enlarged RV with normal LV/RV function. PEEP increased to 24 without signs of RV dilation  - troponins initially elevated, peaked at 162 however downtrended   - s/p esmolol drip for high flows now D/C'd may need to consider again if need for higher flows  - cardene gtt started for hypertension [id number] currently on hold this AM   - ECG daily to monitor QTc currently 465    Pulmonary  #Pulmonary HTN  #ARDS  #Multifocal Pneumonia   #BEA   - history of diagnosed BEA/?OHS intermittent compliance to home PAP  - c/f acute on suspected chronic pulmonary hypertension in setting of possible BEA/OHS c/b pneumonia, ARDS, and pulmonary edema   - CTA chest on 2/24 negative for pulmonary embolism, notable for patchy bilateral lower lobe opacities, hepatomegaly, mild ascites, edema/induration of the abdominal pannus.  - Full RVP and BAL cultures negative    - Bronchoscopy demonstrated copious purulent secretions 2/26 and 2/27  - Bronchoscopy 3/1 noted with small amount of old, dried blood at R and L mainstem, not occluding airway.  BAL NGTD  - s/p Dexamethasone 20mg x 5 days, now on 10 mg x5 days and taper as per clinical status pneumonia/ARDS  - C/w duoNeb q6 hours + 3% saline and IPV   - c/w PC/AC PIP:30  RR 14 PC: 12 PEEP:24 Fio2 40% ~380-480  - difficult glottic visualization due to large tongue upon intubation at [facility] 2/25  - ETT migrated x2 most likely post IPV, needed to be pushed in via bronch, will watch closely (should be 28 @lip)    #ECMO  VV ECMO		  Cannulation sites/dates:  Flow: 4.97	       P1: 199		Delta P: 38  RPM: 3300	       P2: 161		SVO2: 75  Sweep: 1.5 L/min:		            FIO2: 0.5      - was requiring high flows for oxygenation causing high delta P but D/V adequate, no lactate and SVO2 WNL with some hemolysis but now resolving   - Clinically perfusing. Lactate: 1.8  - ECMO sweep sighing q1hr   - Adjust circuit flow as tolerated with DO2:VO2 goal >2  - Monitor for hemolysis, chatter, evidence of recirculation, mixing      Gastrointestinal  #Diet   #Transaminitis  - tolerating tube feeds  - elevated Tbili likely iso hemolysis 2/2 high ECMO flows, mild transaminitis now improving  - hepatomegaly and mild ascites noted on CT A/P - no pocket to tap  - RUQ ultrasound with steatosis    - c/w bowel regimen with Senna and Miralax. S/p Relistor now x 4 doses, also gave dose of magnesium citrate  - triglycerides 147  - nutrition following       Gu/Renal  #ELLA  #Hypernatremia   - ELLA - improved, likely ATN/vascular congestion  - good UO, overall net negative  - s/p intermittent diuresis, started lasix gtt 3/2 to assist with aggressive diuresis, currently at 10mg/h will monitor urine and electrolytes closely   - c/w free h20 250ml q6 hr for hypernatremia    Infectious disease  #leukocytosis  #Pneumonia   #cellulitis   - Leukocytosis now likely iso steroids, remains afebrile  - s/p cefepime, linezolid, and aztihro 2/25-2/26   - s/p vanco (2/26-3/1) and c/w zosyn for a total of ~10 days (2/26-3/8) to cover cellulitis   - full RVP negative  - BCx x 2 2/24 and 2/26 currently NGTD, UCx 2/25 NGTD, tracheal aspirate Cx from 2/25 with normal commensal kyle   - strep pneumo Ag and urine legionella negative   - s/p Mupiricon b/l nares for +MSSA  (2/26-3/3)  - BAL cultures 3/1 currently NGTD, previous BAL 2/26 NGTD  - ? penile meatus yellow discharge, GC/chamydia and HIV negative   - candidal intertrigo +/- cellulitis of the pannus - clotrimazole cream 1% BID 2/26 -+ abx as per above    Hematology/DVT ppx  #Anticoagulation   #Thrombocytopenia  #Anemia   - Hb stable 12.4, thromboctyopenia likely r/t shearing 2/2 ECMO circuit, now improving  - s/p 1 u PRBC 2/26 for O2 delivery  - c/w argatroban with goal 50-70  - LE duplex 2/25 neg    Endocrine  #DM  -A1c 6.7   - obesity with metabolic syndrome  - diabetes with steroid induced hypoglycemia  - c/w decadron and taper iso ARDS  - pituitary workup, steroids as above given recent weight gain but likely volume though  - c/w FS Q6h and moderate ISS, may need to adjust as tapering steroids    SKin/Lines  #Access  #Cellulitis   #DTI  - L subclavian TLC inserted 2/25   - L radial artery line 2/25  - RIJ ECMO cannula 2/26 at 20 cm  - R fem ECMO cannula 2/26 at 25 cm  - candidal intertrigo +/- cellulitis of the pannus - clotrimazole cream 1% BID 2/26 -+ abx as per above  - as per nursing staff, patient with DTI to right buttocks and B/L feet   - wound consult placed     GOC   - full code   - palliative involved  and following while on ECMO   - Mother involved in care and life partner      38 yo M w/ class III obesity, pAfib (no AC), HTN, EBA (on CPAP), history of b/l papilledema attributed to pseudotumor cerebri, who is transferred from [place] on 2/26 for VV ECMO 2/2 ARDS. Patient initially presented to [place] on 2/24 for SOB and b/l LE edema. As per chart review, patient endorses some degree of SOB and b/l LE edema x 3 months with significant weight gain. As per ICU team, patient had positive sick contacts with viral URI 1 to 2 weeks PTA and since then has had worsening SOB. Patient found to be reportedly hypoxemic to 70% on RA with worsening respiratory status/secretions and somnolence in the ED. Patient was a difficult intubation (~6 attempts). Despite optimal vent management, patient remained hypoxemic. Unable to prone due to obesity. Patient cannulated for VV ECMO on 2/25 and transferred to [facility] for further management.     Neuro  #Mental status   #pseudotumor cerebri  - history of pseudotumor cerebri s/p LP under fluoro in 2020 with high opening pressure with MRI 2020 also showed possible pituitary mass but unclear because of pressure effect from pseudotumor cerebri causing partially empty sella. Considering to start acetazolamide but never started  - prolactin high (30), defer checking cortisol axis given already received steroids but should be investigated, ACTH (<1.5) low but should be suppressed iso steroids  - concern for pituitary adenoma, however TSH .79 normal,  fT4 0.9 normal, and low T3 67  - sedated with Dilaudid, low dose propofol, and precedex gtt. Goal to wean dilaudid slightly today as tolerated   - with lowered sedation pt awake, EASLEY, able to follow commands, however became restless pulling at lines and ETT, received versed and dilaudid IVP  - seroquel 25mg BID previously held and dose lowered iso high QTc,  now 466 will resume dose 25mg BID and increase as tolerated   - paralytics d/c'd 2/27   - PT/OT following     Cardiovascular  #HTN   #Atrial fibrillation   - Hx of a. fib not on AC   - echo from 2024 did not demonstrate elevation of pulmonary pressures, but now with severe PH suggesting chronicity on TTE at    - TTE on 2/25 showed LV EF 61%, enlarged RV with TAPSE 2.1cm, ePASP at least 49 (Patient had 10/2024 TTE with normal LV and RV with ePASP 14).  - ROBERT 2/26 showing VTI 17  - ROBERT 3/1 showing mild TR, enlarged RV with normal LV/RV function. PEEP increased to 24 without signs of RV dilation  - troponins initially elevated, peaked at 162 however downtrended   - s/p esmolol drip for high flows now D/C'd may need to consider again if need for higher flows  - cardene gtt started for hypertension held since 3/3  - ECG daily to monitor QTc currently 466    Pulmonary  #Pulmonary HTN  #ARDS  #Multifocal Pneumonia   #BEA   - history of diagnosed BEA/?OHS intermittent compliance to home PAP  - c/f acute on suspected chronic pulmonary hypertension in setting of possible BEA/OHS c/b pneumonia, ARDS, and pulmonary edema   - CTA chest on 2/24 negative for pulmonary embolism, notable for patchy bilateral lower lobe opacities, hepatomegaly, mild ascites, edema/induration of the abdominal pannus.  - Full RVP and BAL cultures negative    - Bronchoscopy demonstrated copious purulent secretions 2/26 and 2/27  - Bronchoscopy 3/1 noted with small amount of old, dried blood at R and L mainstem, not occluding airway.  BAL NGTD  - s/p Dexamethasone 20mg x 5 days, now on 10 mg x5 days and taper as per clinical status pneumonia/ARDS  - C/w duoNeb q6 hours + 3% saline and q12 IPV   - c/w PC/AC PIP:34  RR 14 PC: 12 PEEP:22 Fio2 40% ~500ml  - difficult glottic visualization due to large tongue upon intubation at [facility] 2/25  - ETT migrated x2 most likely post IPV, needed to be pushed in via bronch, will watch closely (should be 28 @lip)    #ECMO  VV ECMO		  Cannulation sites/dates:  Flow: 4.97	       P1: 199		Delta P: 38  RPM: 3300	       P2: 161		SVO2: 75  Sweep: 1.5 L/min:		            FIO2: 0.5      - was requiring high flows for oxygenation causing high delta P but D/V adequate, no lactate and SVO2 WNL with some hemolysis but now resolving   - Clinically perfusing. Lactate: 1.7  - ECMO sweep sighing q1hr   - Adjust circuit flow as tolerated with DO2:VO2 goal >2  - Monitor for hemolysis, chatter, evidence of recirculation, mixing      Gastrointestinal  #Diet   #Transaminitis  - tolerating tube feeds  - elevated Tbili likely iso hemolysis 2/2 high ECMO flows, mild transaminitis now improving  - hepatomegaly and mild ascites noted on CT A/P - no pocket to tap  - RUQ ultrasound with steatosis    - c/w bowel regimen with Senna and Miralax BID  - No bm noted since admission. S/p Relistor now x 5 doses, also gave dose of magnesium citrate, will try reglan, and lactulose 10mg q3 until bm  - triglycerides 147  - nutrition following       Gu/Renal  #ELLA  #Hypernatremia   #Non-AG Metabolic Alkalosis  - ELLA - improved, likely ATN/vascular congestion  - good UO, overall net negative  - s/p intermittent diuresis, started lasix gtt 3/2 to assist with aggressive diuresis, lowered 5mg/h will monitor urine and electrolytes closely   - c/w free h20 250ml q6 hr for hypernatremia  - contraction alkalosis likely 2/2 diuretics, lowered diuretic dose and given albumin 250ml x1    Infectious disease  #leukocytosis  #Pneumonia   #cellulitis   - Leukocytosis now likely iso steroids, remains afebrile  - s/p cefepime, linezolid, and aztihro 2/25-2/26   - s/p vanco (2/26-3/1) and c/w zosyn for a total of ~10 days (2/26-3/8) to cover cellulitis   - full RVP negative  - BCx x 2 2/24 and 2/26 currently NGTD, UCx 2/25 NGTD, tracheal aspirate Cx from 2/25 with normal commensal kyle   - strep pneumo Ag and urine legionella negative   - s/p Mupiricon b/l nares for +MSSA  (2/26-3/3)  - BAL cultures 3/1 currently NGTD, previous BAL 2/26 NGTD  - ? penile meatus yellow discharge, GC/chamydia and HIV negative   - candidal intertrigo +/- cellulitis of the pannus - clotrimazole cream 1% BID 2/26 -+ abx as per above    Hematology/DVT ppx  #Anticoagulation   #Thrombocytopenia  #Anemia   - Hb stable 12.4, thrombocytopenia likely r/t shearing 2/2 ECMO circuit, now improving  - s/p 1 u PRBC 2/26 for O2 delivery  - c/w argatroban with goal 50-70  - LE duplex 2/25 neg    Endocrine  #DM  -A1c 6.7   - obesity with metabolic syndrome  - diabetes with steroid induced hypoglycemia  - c/w decadron and taper iso ARDS  - pituitary workup, steroids as above given recent weight gain but likely volume though  - c/w FS Q6h and moderate ISS, may need to adjust as tapering steroids    SKin/Lines  #Access  #Cellulitis   #DTI  - L subclavian TLC inserted 2/25   - L radial artery line 2/25  - RIJ ECMO cannula 2/26 at 20 cm  - R fem ECMO cannula 2/26 at 25 cm  - candidal intertrigo +/- cellulitis of the pannus - clotrimazole cream 1% BID 2/26 -+ abx as per above  - as per nursing staff, patient with DTI to right buttocks and B/L feet   - wound consult placed     GOC   - full code   - palliative involved  and following while on ECMO   - Mother involved in care and life partner      38 yo M w/ class III obesity, pAfib (no AC), HTN, BEA (on CPAP), history of b/l papilledema attributed to pseudotumor cerebri, who is transferred from [place] on 2/26 for VV ECMO 2/2 ARDS. Patient initially presented to [place] on 2/24 for SOB and b/l LE edema. As per chart review, patient endorses some degree of SOB and b/l LE edema x 3 months with significant weight gain. As per ICU team, patient had positive sick contacts with viral URI 1 to 2 weeks PTA and since then has had worsening SOB. Patient found to be reportedly hypoxemic to 70% on RA with worsening respiratory status/secretions and somnolence in the ED. Patient was a difficult intubation (~6 attempts). Despite optimal vent management, patient remained hypoxemic. Unable to prone due to obesity. Patient cannulated for VV ECMO on 2/25 and transferred to [facility] for further management.     Neuro  #Mental status   #pseudotumor cerebri  - history of pseudotumor cerebri s/p LP under fluoro in 2020 with high opening pressure with MRI 2020 also showed possible pituitary mass but unclear because of pressure effect from pseudotumor cerebri causing partially empty sella. Considering to start acetazolamide but never started  - prolactin high (30), defer checking cortisol axis given already received steroids but should be investigated, ACTH (<1.5) low but should be suppressed iso steroids  - concern for pituitary adenoma, however TSH .79 normal,  fT4 0.9 normal, and low T3 67  - sedated with Dilaudid, low dose propofol, and precedex gtt. Goal to wean dilaudid slightly today as tolerated   - with lowered sedation pt awake, EASLEY, able to follow commands, however became restless pulling at lines and ETT, received versed and dilaudid IVP  - seroquel 25mg BID previously held and dose lowered iso high QTc,  now 466 will resume dose 25mg BID and increase as tolerated   - paralytics d/c'd 2/27   - PT/OT following     Cardiovascular  #HTN   #Atrial fibrillation   - Hx of a. fib not on AC   - echo from 2024 did not demonstrate elevation of pulmonary pressures, but now with severe PH suggesting chronicity on TTE at    - TTE on 2/25 showed LV EF 61%, enlarged RV with TAPSE 2.1cm, ePASP at least 49 (Patient had 10/2024 TTE with normal LV and RV with ePASP 14).  - ROBERT 2/26 showing VTI 17  - ROBERT 3/1 showing mild TR, enlarged RV with normal LV/RV function. PEEP increased to 24 without signs of RV dilation  - troponins initially elevated, peaked at 162 however downtrended   - s/p esmolol drip for high flows now D/C'd may need to consider again if need for higher flows  - cardene gtt started for hypertension held since 3/3  - ECG daily to monitor QTc currently 466    Pulmonary  #Pulmonary HTN  #ARDS  #Multifocal Pneumonia   #BEA   - history of diagnosed BEA/?OHS intermittent compliance to home PAP  - c/f acute on suspected chronic pulmonary hypertension in setting of possible BEA/OHS c/b pneumonia, ARDS, and pulmonary edema   - CTA chest on 2/24 negative for pulmonary embolism, notable for patchy bilateral lower lobe opacities, hepatomegaly, mild ascites, edema/induration of the abdominal pannus.  - Full RVP and BAL cultures negative    - Bronchoscopy demonstrated copious purulent secretions 2/26 and 2/27  - Bronchoscopy 3/1 noted with small amount of old, dried blood at R and L mainstem, not occluding airway.  BAL NGTD  - s/p Dexamethasone 20mg x 5 days, now on 10 mg x5 days and taper as per clinical status pneumonia/ARDS  - C/w duoNeb q6 hours + 3% saline and q12 IPV   - c/w PC/AC PIP:34  RR 14 PC: 12 PEEP:22 Fio2 40% ~500ml  - difficult glottic visualization due to large tongue upon intubation at [facility] 2/25  - ETT migrated x2 most likely post IPV, needed to be pushed in via bronch, will watch closely (should be 28 @lip)    #ECMO  VV ECMO		  Cannulation sites/dates:  Flow: 4.97	       P1: 199		Delta P: 38  RPM: 3300	       P2: 161		SVO2: 75  Sweep: 1.5 L/min:		            FIO2: 0.5      - was requiring high flows for oxygenation causing high delta P but D/V adequate, no lactate and SVO2 WNL with some hemolysis but now resolving   - Clinically perfusing. Lactate: 1.7  - ECMO sweep sighing q1hr   - Adjust circuit flow as tolerated with DO2:VO2 goal >2  - Monitor for hemolysis, chatter, evidence of recirculation, mixing      Gastrointestinal  #Diet   #Transaminitis  - tolerating tube feeds  - elevated Tbili likely iso hemolysis 2/2 high ECMO flows, mild transaminitis now improving  - hepatomegaly and mild ascites noted on CT A/P - no pocket to tap  - RUQ ultrasound with steatosis    - c/w bowel regimen with Senna and Miralax BID  - No bm noted since admission. S/p Relistor now x 5 doses, also gave dose of magnesium citrate, will try reglan, and lactulose 10mg q3 until bm  - triglycerides 147  - nutrition following       Gu/Renal  #ELLA  #Hypernatremia   #Non-AG Metabolic Alkalosis  - ELLA - improved, likely ATN/vascular congestion  - good UO, overall net negative  - c/w free h20 250ml q6 hr for hypernatremia  - s/p intermittent diuresis, started lasix gtt 3/2 to assist with aggressive diuresis, monitor urine and electrolytes closely   - contraction alkalosis likely 2/2 diuretics, given albumin 250ml x1, lasix gtt discontinued 3/4 and placed on 40mg BID     Infectious disease  #leukocytosis  #Pneumonia   #cellulitis   - Leukocytosis now likely iso steroids, remains afebrile  - s/p cefepime, linezolid, and aztihro 2/25-2/26   - s/p vanco (2/26-3/1) and c/w zosyn for a total of ~10 days (2/26-3/8) to cover cellulitis   - full RVP negative  - BCx x 2 2/24 and 2/26 currently NGTD, UCx 2/25 NGTD, tracheal aspirate Cx from 2/25 with normal commensal kyle   - strep pneumo Ag and urine legionella negative   - s/p Mupiricon b/l nares for +MSSA  (2/26-3/3)  - BAL cultures 3/1 currently NGTD, previous BAL 2/26 NGTD  - ? penile meatus yellow discharge, GC/chamydia and HIV negative   - candidal intertrigo +/- cellulitis of the pannus - clotrimazole cream 1% BID 2/26 -+ abx as per above    Hematology/DVT ppx  #Anticoagulation   #Thrombocytopenia  #Anemia   - Hb stable 12.4, thrombocytopenia likely r/t shearing 2/2 ECMO circuit, now improving  - s/p 1 u PRBC 2/26 for O2 delivery  - c/w argatroban with goal 50-70  - LE duplex 2/25 neg    Endocrine  #DM  -A1c 6.7   - obesity with metabolic syndrome  - diabetes with steroid induced hypoglycemia  - c/w decadron and taper iso ARDS  - pituitary workup, steroids as above given recent weight gain but likely volume though  - c/w FS Q6h and moderate ISS, may need to adjust as tapering steroids    SKin/Lines  #Access  #Cellulitis   #DTI  - L subclavian TLC inserted 2/25   - L radial artery line 2/25  - RIJ ECMO cannula 2/26 at 20 cm  - R fem ECMO cannula 2/26 at 25 cm  - candidal intertrigo +/- cellulitis of the pannus - clotrimazole cream 1% BID 2/26 -+ abx as per above  - as per nursing staff, patient with DTI to right buttocks and B/L feet   - wound consult placed     GOC   - full code   - palliative involved  and following while on ECMO   - Mother involved in care and life partner      40 yo M w/ class III obesity, pAfib (no AC), HTN, BEA (on CPAP), history of b/l papilledema attributed to pseudotumor cerebri, who is transferred from [place] on 2/26 for VV ECMO 2/2 ARDS. Patient initially presented to [place] on 2/24 for SOB and b/l LE edema. As per chart review, patient endorses some degree of SOB and b/l LE edema x 3 months with significant weight gain. As per ICU team, patient had positive sick contacts with viral URI 1 to 2 weeks PTA and since then has had worsening SOB. Patient found to be reportedly hypoxemic to 70% on RA with worsening respiratory status/secretions and somnolence in the ED. Patient was a difficult intubation (~6 attempts). Despite optimal vent management, patient remained hypoxemic. Unable to prone due to obesity. Patient cannulated for VV ECMO on 2/25 and transferred to [facility] for further management.     Neuro  #Mental status   #pseudotumor cerebri  - history of pseudotumor cerebri s/p LP under fluoro in 2020 with high opening pressure with MRI 2020 also showed possible pituitary mass but unclear because of pressure effect from pseudotumor cerebri causing partially empty sella. Considering to start acetazolamide but never started  - prolactin high (30), defer checking cortisol axis given already received steroids but should be investigated, ACTH (<1.5) low but should be suppressed iso steroids  - concern for pituitary adenoma, however TSH .79 normal,  fT4 0.9 normal, and low T3 67  - sedated with Dilaudid, low dose propofol, and precedex gtt. Goal to wean dilaudid slightly today as tolerated   - with lowered sedation pt awake, EASLEY, able to follow commands, however became restless pulling at lines and ETT, received versed and dilaudid IVP  - seroquel 25mg BID previously held and dose lowered iso high QTc,  now 466 will resume dose 25mg BID and increase as tolerated   - paralytics d/c'd 2/27   - PT/OT following     Cardiovascular  #HTN   #Atrial fibrillation   - Hx of a. fib not on AC   - echo from 2024 did not demonstrate elevation of pulmonary pressures, but now with severe PH suggesting chronicity on TTE at    - TTE on 2/25 showed LV EF 61%, enlarged RV with TAPSE 2.1cm, ePASP at least 49 (Patient had 10/2024 TTE with normal LV and RV with ePASP 14).  - ROBERT 2/26 showing VTI 17  - ROBERT 3/1 showing mild TR, enlarged RV with normal LV/RV function. PEEP increased to 24 without signs of RV dilation  - troponins initially elevated, peaked at 162 however downtrended   - s/p esmolol drip for high flows now D/C'd may need to consider again if need for higher flows  - cardene gtt started for hypertension held since 3/3  - ECG daily to monitor QTc currently 466    Pulmonary  #Pulmonary HTN  #ARDS  #Multifocal Pneumonia   #BEA   - history of diagnosed BEA/?OHS intermittent compliance to home PAP  - c/f acute on suspected chronic pulmonary hypertension in setting of possible BEA/OHS c/b pneumonia, ARDS, and pulmonary edema   - CTA chest on 2/24 negative for pulmonary embolism, notable for patchy bilateral lower lobe opacities, hepatomegaly, mild ascites, edema/induration of the abdominal pannus.  - Full RVP and BAL cultures negative    - Bronchoscopy demonstrated copious purulent secretions 2/26 and 2/27  - Bronchoscopy 3/1 noted with small amount of old, dried blood at R and L mainstem, not occluding airway.  BAL NGTD  - s/p Dexamethasone 20mg x 5 days, now on 10 mg x5 days and taper as per clinical status pneumonia/ARDS  - C/w duoNeb q6 hours + 3% saline and q12 IPV   - c/w PC/AC PIP:34  RR 14 PC: 12 PEEP:22 Fio2 40% ~500ml  - difficult glottic visualization due to large tongue upon intubation at [facility] 2/25  - ETT migrated x2 most likely post IPV, needed to be pushed in via bronch, will watch closely (should be 28 @lip)    #ECMO  VV ECMO		  Cannulation sites/dates:  Flow: 4.97	       P1: 199		Delta P: 38  RPM: 3300	       P2: 161		SVO2: 75  Sweep: 1.5 L/min:		            FIO2: 0.5      - was requiring high flows for oxygenation causing high delta P but D/V adequate, no lactate and SVO2 WNL with some hemolysis but now resolving   - Clinically perfusing. Lactate: 1.7  - ECMO sweep sighing q1hr   - Adjust circuit flow as tolerated with DO2:VO2 goal >2  - Monitor for hemolysis, chatter, evidence of recirculation, mixing      Gastrointestinal  #Diet   #Transaminitis  - tolerating tube feeds  - elevated Tbili likely iso hemolysis 2/2 high ECMO flows, mild transaminitis now improving  - hepatomegaly and mild ascites noted on CT A/P - no pocket to tap  - RUQ ultrasound with steatosis    - c/w bowel regimen with Senna and Miralax BID  - No bm noted since admission. S/p Relistor now x 5 doses, also gave dose of magnesium citrate, will try reglan, and lactulose 10mg q3 until bm  - triglycerides 147  - nutrition following       Gu/Renal  #ELLA  #Hypernatremia   #Non-AG Metabolic Alkalosis  - ELLA - improved, likely ATN/vascular congestion  - good UO, overall net negative  - c/w free h20 250ml q6 hr for hypernatremia  - s/p intermittent diuresis, started lasix gtt 3/2 to assist with aggressive diuresis, monitor urine and electrolytes closely   - contraction alkalosis likely 2/2 diuretics, given albumin 250ml x1, lasix gtt discontinued 3/4 and placed on 40mg BID, goal net negative -2 Liters    Infectious disease  #leukocytosis  #Pneumonia   #cellulitis   - Leukocytosis now likely iso steroids, remains afebrile  - s/p cefepime, linezolid, and aztihro 2/25-2/26   - s/p vanco (2/26-3/1) and c/w zosyn for a total of ~10 days (2/26-3/8) to cover cellulitis   - full RVP negative  - BCx x 2 2/24 and 2/26 currently NGTD, UCx 2/25 NGTD, tracheal aspirate Cx from 2/25 with normal commensal kyle   - strep pneumo Ag and urine legionella negative   - s/p Mupiricon b/l nares for +MSSA  (2/26-3/3)  - BAL cultures 3/1 currently NGTD, previous BAL 2/26 NGTD  - ? penile meatus yellow discharge, GC/chamydia and HIV negative   - candidal intertrigo +/- cellulitis of the pannus - clotrimazole cream 1% BID 2/26 -+ abx as per above    Hematology/DVT ppx  #Anticoagulation   #Thrombocytopenia  #Anemia   - Hb stable 12.4, thrombocytopenia likely r/t shearing 2/2 ECMO circuit, now improving  - s/p 1 u PRBC 2/26 for O2 delivery  - c/w argatroban with goal 50-70  - LE duplex 2/25 neg    Endocrine  #DM  -A1c 6.7   - obesity with metabolic syndrome  - diabetes with steroid induced hypoglycemia  - c/w decadron and taper iso ARDS  - pituitary workup, steroids as above given recent weight gain but likely volume though  - c/w FS Q6h and moderate ISS, may need to adjust as tapering steroids    SKin/Lines  #Access  #Cellulitis   #DTI  - L subclavian TLC inserted 2/25   - L radial artery line 2/25  - RIJ ECMO cannula 2/26 at 20 cm  - R fem ECMO cannula 2/26 at 25 cm  - candidal intertrigo +/- cellulitis of the pannus - clotrimazole cream 1% BID 2/26 -+ abx as per above  - as per nursing staff, patient with DTI to right buttocks and B/L feet   - wound consult placed     GOC   - full code   - palliative involved  and following while on ECMO   - Mother involved in care and life partner      38 yo M w/ class III obesity, pAfib (no AC), HTN, BEA (on CPAP), history of b/l papilledema attributed to pseudotumor cerebri, who is transferred from [place] on 2/26 for VV ECMO 2/2 ARDS. Patient initially presented to [place] on 2/24 for SOB and b/l LE edema. As per chart review, patient endorses some degree of SOB and b/l LE edema x 3 months with significant weight gain. As per ICU team, patient had positive sick contacts with viral URI 1 to 2 weeks PTA and since then has had worsening SOB. Patient found to be reportedly hypoxemic to 70% on RA with worsening respiratory status/secretions and somnolence in the ED. Patient was a difficult intubation (~6 attempts). Despite optimal vent management, patient remained hypoxemic. Unable to prone due to obesity. Patient cannulated for VV ECMO on 2/25 and transferred to [facility] for further management.     Neuro  #Mental status   #pseudotumor cerebri  - history of pseudotumor cerebri s/p LP under fluoro in 2020 with high opening pressure with MRI 2020 also showed possible pituitary mass but unclear because of pressure effect from pseudotumor cerebri causing partially empty sella. Considering to start acetazolamide but never started  - prolactin high (30), defer checking cortisol axis given already received steroids but should be investigated, ACTH (<1.5) low but should be suppressed iso steroids  - concern for pituitary adenoma, however TSH .79 normal,  fT4 0.9 normal, and low T3 67  - sedated with Dilaudid, low dose propofol, and precedex gtt. Goal to wean dilaudid slightly today as tolerated   - with lowered sedation pt awake, EASLEY, able to follow commands, however became restless pulling at lines and ETT, received versed and dilaudid IVP  - seroquel 25mg BID previously held and dose lowered iso high QTc,  now 466 will resume dose 25mg BID and increase as tolerated   - paralytics d/c'd 2/27   - PT/OT following     Cardiovascular  #HTN   #Atrial fibrillation   - Hx of a. fib not on AC   - echo from 2024 did not demonstrate elevation of pulmonary pressures, but now with severe PH suggesting chronicity on TTE at    - TTE on 2/25 showed LV EF 61%, enlarged RV with TAPSE 2.1cm, ePASP at least 49 (Patient had 10/2024 TTE with normal LV and RV with ePASP 14).  - ROBERT 2/26 showing VTI 17  - ROBERT 3/1 showing mild TR, enlarged RV with normal LV/RV function. PEEP increased to 24 without signs of RV dilation  - troponins initially elevated, peaked at 162 however downtrended   - s/p esmolol drip for high flows now D/C'd may need to consider again if need for higher flows  - cardene gtt started for hypertension held since 3/3  - ECG daily to monitor QTc currently 466    Pulmonary  #Pulmonary HTN  #ARDS  #Multifocal Pneumonia   #BEA   - history of diagnosed BEA/?OHS intermittent compliance to home PAP  - c/f acute on suspected chronic pulmonary hypertension in setting of possible BEA/OHS c/b pneumonia, ARDS, and pulmonary edema   - CTA chest on 2/24 negative for pulmonary embolism, notable for patchy bilateral lower lobe opacities, hepatomegaly, mild ascites, edema/induration of the abdominal pannus.  - Full RVP and BAL cultures negative    - Bronchoscopy demonstrated copious purulent secretions 2/26 and 2/27  - Bronchoscopy 3/1 noted with small amount of old, dried blood at R and L mainstem, not occluding airway.  BAL NGTD  - s/p Dexamethasone 20mg x 5 days, now on 10 mg x5 days and taper as per clinical status pneumonia/ARDS  - C/w duoNeb q6 hours + 3% saline and q12 IPV   - c/w PC/AC PIP:34  RR 14 PC: 12 PEEP:22 Fio2 40% ~500ml  - difficult glottic visualization due to large tongue upon intubation at [facility] 2/25  - ETT migrated x2 most likely post IPV, needed to be pushed in via bronch, will watch closely (should be 28 @lip)    #ECMO  VV ECMO		  Cannulation sites/dates:  Flow: 4.97	       P1: 199		Delta P: 38  RPM: 3300	       P2: 161		SVO2: 75  Sweep: 1.5 L/min:		            FIO2: 0.5      - was requiring high flows for oxygenation causing high delta P but D/V adequate, no lactate and SVO2 WNL with some hemolysis but now resolving   - Clinically perfusing. Lactate: 1.7  - ECMO sweep sighing q1hr   - Adjust circuit flow as tolerated with DO2:VO2 goal >2  - Monitor for hemolysis, chatter, evidence of recirculation, mixing      Gastrointestinal  #Diet   #Transaminitis  - tolerating tube feeds  - elevated Tbili likely iso hemolysis 2/2 high ECMO flows, mild transaminitis now improving  - hepatomegaly and mild ascites noted on CT A/P - no pocket to tap  - RUQ ultrasound with steatosis    - c/w bowel regimen with Senna and Miralax BID  - No bm noted since admission. S/p Relistor now x 5 doses, also gave dose of magnesium citrate, will try reglan, and lactulose 10mg q3 until bm  - triglycerides 147  - nutrition following       Gu/Renal  #ELLA  #Hypernatremia   #Non-AG Metabolic Alkalosis  - ELLA - improved, likely ATN/vascular congestion  - good UO, overall net negative  - c/w free h20 250ml q6 hr for hypernatremia  - s/p intermittent diuresis, started lasix gtt 3/2 to assist with aggressive diuresis, monitor urine and electrolytes closely   - contraction alkalosis likely 2/2 diuretics, given albumin 250ml x1, lasix gtt discontinued 3/4 and placed on 40mg BID, goal net negative -2 Liters    Infectious disease  #leukocytosis  #Pneumonia   #cellulitis   - Leukocytosis now likely iso steroids, remains afebrile  - s/p cefepime, linezolid, and aztihro 2/25-2/26   - s/p vanco (2/26-3/1) and c/w zosyn for a total of ~10 days (2/26-3/8) to cover cellulitis   - full RVP negative  - BCx x 2 2/24 and 2/26 currently NGTD, UCx 2/25 NGTD, tracheal aspirate Cx from 2/25 with normal commensal kyle   - strep pneumo Ag and urine legionella negative   - s/p Mupiricon b/l nares for +MSSA  (2/26-3/3)  - BAL cultures 3/1 currently NGTD, previous BAL 2/26 NGTD  - ? penile meatus yellow discharge, GC/chamydia and HIV negative   - candidal intertrigo +/- cellulitis of the pannus - clotrimazole cream 1% BID 2/26 -+ abx as per above    Hematology/DVT ppx  #Anticoagulation   #Thrombocytopenia  #Anemia   - Hb stable 12.4, thrombocytopenia likely r/t shearing 2/2 ECMO circuit, now improving  - 4T score low probability of HIT, received heparin at OSH, HIT/SHAWANDA sent, f/u  - c/w argatroban with goal 50-70  - LE duplex 2/25 neg  - s/p 1 u PRBC 2/26 for O2 delivery    Endocrine  #DM  -A1c 6.7   - obesity with metabolic syndrome  - diabetes with steroid induced hypoglycemia  - c/w decadron and taper iso ARDS  - pituitary workup, steroids as above given recent weight gain but likely volume though  - c/w FS Q6h and moderate ISS, may need to adjust as tapering steroids    SKin/Lines  #Access  #Cellulitis   #DTI    - L subclavian TLC inserted 2/25   - L radial artery line 2/25  - RIJ ECMO cannula 2/26 at 20 cm  - R fem ECMO cannula 2/26 at 25 cm  - candidal intertrigo +/- cellulitis of the pannus - clotrimazole cream 1% BID 2/26 -+ abx as per above  - as per nursing staff, patient with DTI to right buttocks and B/L feet   - wound consult placed     #Blisters  Blisters noted soles of b/l feet   - less concern for heparin induced reaction, low 4T score, SHAWANDA/HIT w/u sent, currently on argatroban gtt  - [facility]/PVR evaluate for PAD  - podiatry consulted for worsening of b/l foot blisters- recs vascular consult  - Vascular consult placed          GOC   - full code   - palliative involved  and following while on ECMO   - Mother involved in care and life partner

## 2025-03-04 NOTE — CONSULT NOTE ADULT - SUBJECTIVE AND OBJECTIVE BOX
Patient is a 39y old  Male who presents with a chief complaint of sob (02 Mar 2025 06:19)      HPI:  40 yo M w/ class III obesity, pAfib (no AC), HTN, BEA (on CPAP), history of b/l papilledema attributed to pseudotumor cerebri s/p LP in 2024 with very high opening pressure, who is transferred for VV ECMO for ARDS and severe hypoxia. Patient initially presented to [place] on 2/24 for SOB and b/l LE edema. 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 He is a current every day smoker of Newports and marijuana, but does not drink or do other drugs. Currently lives with his mother. There is a long term partner in his life, but they are not , and they have an on/off relationship currently not very involved with each other as per the patient's mother and aunt.  At [facility] where he was getting an eval last year for shortness of breath, he had a sleep study and was diagnosed with sleep apnea, prescribed a PAP machine, which he has in his room but the mother is not sure how many nights per week he uses it. Recently, the last day or two, his mother and brother have been under the weather with URIs and the patient 2 days ago started to develop a ruynny nose, ough, some wheezing of the chest, as well as worsening shortness of breath and fevers at home. He called his aunt who told him to go get checked out and then he landed at the [place] ED. Patient found to be reportedly hypoxemic to 70% on RA with worsening respiratory status/secretions and somnolence in the ED. Patient was intubated with difficulty (7 attempts) due to very large tongue secretions. Despite optimal vent management, patient remained hypoxemic. Unable to prone due to obesity. Patient cannulated for VV ECMO on 2/25 and transferred to [facility] for further management.     [place] labs notable for MRSA PCR -, Fluvid -, urine legionella -, sputum gram stain  with GPC and GPR    CTA chest on 2/24 negative for pulmonary embolism, notable for patchy bilateral lower lobe opacities, hepatomegaly, mild ascites, edema/induration of the abdominal pannus.    LE duplex on 2/25 negative for DVT    TTE on 2/25 showed LV EF 61%, enlarged RV with TAPSE 2.1cm, ePASP at least 49 (Patient had 10/2024 TTE with normal LV and RV with ePASP 14).    Only home med is Lasix. Not on acetazolamide for pseudotumor or on Wegovy (was pending auth) (26 Feb 2025 01:48)      PAST MEDICAL & SURGICAL HISTORY:  HTN (hypertension)      Atrial fibrillation  paroxysmal      Papilledema of both eyes      Chronic sinusitis      Morbid obesity      No significant past surgical history          MEDICATIONS  (STANDING):  albuterol/ipratropium for Nebulization 3 milliLiter(s) Nebulizer every 6 hours  argatroban Infusion 1.094 MICROgram(s)/kG/Min (15.8 mL/Hr) IV Continuous <Continuous>  chlorhexidine 0.12% Liquid 15 milliLiter(s) Oral Mucosa every 12 hours  chlorhexidine 2% Cloths 1 Application(s) Topical <User Schedule>  clotrimazole 1% Cream 1 Application(s) Topical two times a day  dexAMETHasone  IVPB 10 milliGRAM(s) IV Intermittent daily  dexMEDEtomidine Infusion 1 MICROgram(s)/kG/Hr (60.2 mL/Hr) IV Continuous <Continuous>  dextrose 50% Injectable 25 Gram(s) IV Push once  dextrose 50% Injectable 12.5 Gram(s) IV Push once  dextrose 50% Injectable 25 Gram(s) IV Push once  furosemide   Injectable 40 milliGRAM(s) IV Push every 12 hours  glucagon  Injectable 1 milliGRAM(s) IntraMuscular once  HYDROmorphone Infusion. 1 mG/Hr (1 mL/Hr) IV Continuous <Continuous>  insulin lispro (ADMELOG) corrective regimen sliding scale   SubCutaneous every 6 hours  lactulose Syrup 10 Gram(s) Oral every 3 hours  multivitamin/minerals/iron Oral Solution (CENTRUM) 15 milliLiter(s) Oral daily  pantoprazole  Injectable 40 milliGRAM(s) IV Push daily  petrolatum Ophthalmic Ointment 1 Application(s) Both EYES every 12 hours  piperacillin/tazobactam IVPB.. 4.5 Gram(s) IV Intermittent every 8 hours  polyethylene glycol 3350 17 Gram(s) Oral two times a day  propofol Infusion 25 MICROgram(s)/kG/Min (36.1 mL/Hr) IV Continuous <Continuous>  QUEtiapine 25 milliGRAM(s) Oral every 12 hours  senna Syrup 10 milliLiter(s) Oral two times a day  sodium chloride 3%  Inhalation 4 milliLiter(s) Inhalation every 6 hours    MEDICATIONS  (PRN):      Allergies    No Known Allergies    Intolerances        VITALS:    Vital Signs Last 24 Hrs  T(C): 35.6 (04 Mar 2025 16:00), Max: 36.2 (03 Mar 2025 20:00)  T(F): 96.1 (04 Mar 2025 16:00), Max: 97.2 (03 Mar 2025 20:00)  HR: 83 (04 Mar 2025 16:23) (52 - 101)  BP: --  BP(mean): --  RR: 14 (04 Mar 2025 16:00) (14 - 19)  SpO2: 96% (04 Mar 2025 16:23) (89% - 100%)    Parameters below as of 04 Mar 2025 16:00  Patient On (Oxygen Delivery Method): ventilator    O2 Concentration (%): 40    LABS:                          11.7   15.16 )-----------( 123      ( 04 Mar 2025 15:49 )             41.1       03-04    146[H]  |  107  |  40[H]  ----------------------------<  216[H]  3.9   |  31  |  0.81    Ca    8.5      04 Mar 2025 15:49  Phos  3.9     03-04  Mg     2.20     03-04    TPro  6.3  /  Alb  3.0[L]  /  TBili  1.0  /  DBili  x   /  AST  60[H]  /  ALT  114[H]  /  AlkPhos  57  03-04      CAPILLARY BLOOD GLUCOSE      POCT Blood Glucose.: 154 mg/dL (04 Mar 2025 11:22)  POCT Blood Glucose.: 133 mg/dL (04 Mar 2025 05:09)  POCT Blood Glucose.: 156 mg/dL (03 Mar 2025 23:01)      PT/INR - ( 04 Mar 2025 15:49 )   PT: 27.0 sec;   INR: 2.35 ratio         PTT - ( 04 Mar 2025 15:49 )  PTT:61.0 sec    LOWER EXTREMITY PHYSICAL EXAM:    Vascular: DP 1/4 B/L, PT 0/4, B/L secondary to +3 pitting edema, CFT <3 seconds B/L, Temperature gradient warm to cool, B/L.   Neuro: unable to assess  Musculoskeletal/Ortho: unremarkable   Skin: Bilateral plantar lateral forefoot serous blisters, no acute signs of infection.      RADIOLOGY & ADDITIONAL STUDIES:

## 2025-03-04 NOTE — PROGRESS NOTE ADULT - SUBJECTIVE AND OBJECTIVE BOX
Interval Events:   -Lasix gtt lowered to 5mg/hr   -Sweep increased tp 1.5, and fdo2 lowered from 60 to 50%  -remains slightly hypernatremic iso of aggressive diuresis         HPI:  40 yo M w/ class III obesity, pAfib (no AC), HTN, BEA (on CPAP), history of b/l papilledema attributed to pseudotumor cerebri s/p LP in 2024 with very high opening pressure, who is transferred for VV ECMO for ARDS and severe hypoxia. Patient initially presented to [place] on 2/24 for SOB and b/l LE edema. 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 He is a current every day smoker of Newports and marijuana, but does not drink or do other drugs. Currently lives with his mother. There is a long term partner in his life, but they are not , and they have an on/off relationship currently not very involved with each other as per the patient's mother and aunt.  At [facility] where he was getting an eval last year for shortness of breath, he had a sleep study and was diagnosed with sleep apnea, prescribed a PAP machine, which he has in his room but the mother is not sure how many nights per week he uses it. Recently, the last day or two, his mother and brother have been under the weather with URIs and the patient 2 days ago started to develop a ruynny nose, ough, some wheezing of the chest, as well as worsening shortness of breath and fevers at home. He called his aunt who told him to go get checked out and then he landed at the [place] ED. Patient found to be reportedly hypoxemic to 70% on RA with worsening respiratory status/secretions and somnolence in the ED. Patient was intubated with difficulty (7 attempts) due to very large tongue secretions. Despite optimal vent management, patient remained hypoxemic. Unable to prone due to obesity. Patient cannulated for VV ECMO on 2/25 and transferred to [facility] for further management.     [place] labs notable for MRSA PCR -, Fluvid -, urine legionella -, sputum gram stain  with GPC and GPR    CTA chest on 2/24 negative for pulmonary embolism, notable for patchy bilateral lower lobe opacities, hepatomegaly, mild ascites, edema/induration of the abdominal pannus.    LE duplex on 2/25 negative for DVT    TTE on 2/25 showed LV EF 61%, enlarged RV with TAPSE 2.1cm, ePASP at least 49 (Patient had 10/2024 TTE with normal LV and RV with ePASP 14).    Only home med is Lasix. Not on acetazolamide for pseudotumor or on Wegovy (was pending auth) (26 Feb 2025 01:48)      REVIEW OF SYSTEMS:    [x ] Unable to assess ROS because patient is intubated/sedated         REVIEW OF SYSTEMS:  Unable to assess ROS because pt is intubated and sedated    Vital Signs Last 24 Hrs  T(C): 36 (04 Mar 2025 05:00), Max: 36.2 (03 Mar 2025 08:00)  T(F): 96.8 (04 Mar 2025 05:00), Max: 97.2 (03 Mar 2025 08:00)  HR: 57 (04 Mar 2025 06:00) (52 - 77)  BP: --  BP(mean): --  RR: 14 (04 Mar 2025 06:00) (14 - 15)  SpO2: 100% (04 Mar 2025 06:00) (98% - 100%)        I&O's Summary    03 Mar 2025 07:01  -  04 Mar 2025 07:00  --------------------------------------------------------  IN: 4685.1 mL / OUT: 9650 mL / NET: -4964.9 mL          ECMO SETTINGS:  Type:		[ ] Venovenous		[ ] Venoarterial  Cannulation Site(s):     Flow:  	          P1:                  Delta P:  RPM: 		  P2:                  SVO2:    Oxygenator:	Sweep:     L/min	FiO2:     ABG - ( 04 Mar 2025 06:15 )  pH: 7.48  /  pCO2: 54    /  pO2: 102   / HCO3: 40    / Base Excess: 14.4  /  SaO2: 98.4                  VENTILATOR SETTINGS:     Mode: AC/ CMV (Assist Control/ Continuous Mandatory Ventilation)  RR (machine): 14  FiO2: 40  PEEP: 24  ITime: 1  MAP: 28  PC: 12  PIP: 36          Physical Exam:   Constitutional: ill appearing, no acute distress   HEENT: + PERRLA, EOMI, no drainage or redness  Neck: supple,  No JVD, Right IJ ecmo cannula in place   Respiratory: Ventilator assisted breath Sounds diminished bilaterally to auscultation, no accessory muscle use noted  Cardiovascular: Regular rate, regular rhythm, normal S1, S2; no murmurs or rub  Gastrointestinal: Obese, soft, non distended, no hepatosplenomegaly, normal bowel sounds  Extremities: + 2 peripheral edema, no cyanosis, no clubbing   Vascular: Equal and normal pulses: 2+ peripheral pulses throughout  Neurological: sedated/intubated  Musculoskeletal: No joint swelling or deformity; no limitation of movement  Skin: warm, dry, well perfused, cellulitis to pannus area, DTI to right buttocks         LABS:                          12.1   18.64 )-----------( 131      ( 04 Mar 2025 03:45 )             44.6                          03-04    146[H]  |  105  |  40[H]  ----------------------------<  169[H]  4.1   |  34[H]  |  0.96    Ca    8.3[L]      04 Mar 2025 03:45  Phos  3.8     03-04  Mg     2.20     03-04    TPro  6.7  /  Alb  2.8[L]  /  TBili  1.1  /  DBili  x   /  AST  67[H]  /  ALT  130[H]  /  AlkPhos  61  03-04      LIVER FUNCTIONS - ( 04 Mar 2025 03:45 )  Alb: 2.8 g/dL / Pro: 6.7 g/dL / ALK PHOS: 61 U/L / ALT: 130 U/L / AST: 67 U/L / GGT: x             PT/INR - ( 04 Mar 2025 03:45 )   PT: 27.8 sec;   INR: 2.36 ratio         PTT - ( 04 Mar 2025 03:45 )  PTT:60.2 sec      Culture - Fungal, Bronchial (collected 01 Mar 2025 12:45)  Source: Bronchial Bronchial Lavage  Preliminary Report (03 Mar 2025 10:47):    No growth    Culture - Acid Fast - Bronchial w/Smear (collected 01 Mar 2025 12:45)  Source: Bronchial Bronchial Lavage    Culture - Bronchial (collected 01 Mar 2025 12:45)  Source: Bronchial Bronchial Lavage  Gram Stain (01 Mar 2025 21:53):    Few polymorphonuclear leukocytes per low power field    No Squamous epithelial cells per low power field    No organisms seen per oil power field  Final Report (03 Mar 2025 11:59):    No growth to date          ACTIVE MEDS:    MEDICATIONS  (STANDING):  albuterol/ipratropium for Nebulization 3 milliLiter(s) Nebulizer every 6 hours  argatroban Infusion 1.094 MICROgram(s)/kG/Min (15.8 mL/Hr) IV Continuous <Continuous>  chlorhexidine 0.12% Liquid 15 milliLiter(s) Oral Mucosa every 12 hours  chlorhexidine 2% Cloths 1 Application(s) Topical <User Schedule>  clotrimazole 1% Cream 1 Application(s) Topical two times a day  dexAMETHasone  IVPB 10 milliGRAM(s) IV Intermittent daily  dexMEDEtomidine Infusion 1 MICROgram(s)/kG/Hr (60.2 mL/Hr) IV Continuous <Continuous>  dextrose 50% Injectable 25 Gram(s) IV Push once  dextrose 50% Injectable 12.5 Gram(s) IV Push once  dextrose 50% Injectable 25 Gram(s) IV Push once  furosemide Infusion 5 mG/Hr (2.5 mL/Hr) IV Continuous <Continuous>  glucagon  Injectable 1 milliGRAM(s) IntraMuscular once  HYDROmorphone Infusion. 1 mG/Hr (1 mL/Hr) IV Continuous <Continuous>  insulin lispro (ADMELOG) corrective regimen sliding scale   SubCutaneous every 6 hours  multivitamin/minerals/iron Oral Solution (CENTRUM) 15 milliLiter(s) Oral daily  pantoprazole  Injectable 40 milliGRAM(s) IV Push daily  petrolatum Ophthalmic Ointment 1 Application(s) Both EYES every 12 hours  piperacillin/tazobactam IVPB.. 4.5 Gram(s) IV Intermittent every 8 hours  polyethylene glycol 3350 17 Gram(s) Oral two times a day  propofol Infusion 25 MICROgram(s)/kG/Min (36.1 mL/Hr) IV Continuous <Continuous>  QUEtiapine 12.5 milliGRAM(s) Oral two times a day  senna Syrup 10 milliLiter(s) Oral two times a day  sodium chloride 3%  Inhalation 4 milliLiter(s) Inhalation every 6 hours       Interval Events:   -Lasix gtt lowered to 5mg/hr   -Sweep increased tp 1.5, and fdo2 lowered from 60 to 50%  -remains slightly hypernatremic iso of aggressive diuresis         HPI:  40 yo M w/ class III obesity, pAfib (no AC), HTN, BEA (on CPAP), history of b/l papilledema attributed to pseudotumor cerebri s/p LP in 2024 with very high opening pressure, who is transferred for VV ECMO for ARDS and severe hypoxia. Patient initially presented to [place] on 2/24 for SOB and b/l LE edema. 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 He is a current every day smoker of Newports and marijuana, but does not drink or do other drugs. Currently lives with his mother. There is a long term partner in his life, but they are not , and they have an on/off relationship currently not very involved with each other as per the patient's mother and aunt.  At [facility] where he was getting an eval last year for shortness of breath, he had a sleep study and was diagnosed with sleep apnea, prescribed a PAP machine, which he has in his room but the mother is not sure how many nights per week he uses it. Recently, the last day or two, his mother and brother have been under the weather with URIs and the patient 2 days ago started to develop a ruynny nose, ough, some wheezing of the chest, as well as worsening shortness of breath and fevers at home. He called his aunt who told him to go get checked out and then he landed at the [place] ED. Patient found to be reportedly hypoxemic to 70% on RA with worsening respiratory status/secretions and somnolence in the ED. Patient was intubated with difficulty (7 attempts) due to very large tongue secretions. Despite optimal vent management, patient remained hypoxemic. Unable to prone due to obesity. Patient cannulated for VV ECMO on 2/25 and transferred to [facility] for further management.     [place] labs notable for MRSA PCR -, Fluvid -, urine legionella -, sputum gram stain  with GPC and GPR    CTA chest on 2/24 negative for pulmonary embolism, notable for patchy bilateral lower lobe opacities, hepatomegaly, mild ascites, edema/induration of the abdominal pannus.    LE duplex on 2/25 negative for DVT    TTE on 2/25 showed LV EF 61%, enlarged RV with TAPSE 2.1cm, ePASP at least 49 (Patient had 10/2024 TTE with normal LV and RV with ePASP 14).    Only home med is Lasix. Not on acetazolamide for pseudotumor or on Wegovy (was pending auth) (26 Feb 2025 01:48)      REVIEW OF SYSTEMS:  [x ] Unable to assess ROS because patient is intubated/sedated             Vital Signs Last 24 Hrs  T(C): 36 (04 Mar 2025 05:00), Max: 36.2 (03 Mar 2025 08:00)  T(F): 96.8 (04 Mar 2025 05:00), Max: 97.2 (03 Mar 2025 08:00)  HR: 57 (04 Mar 2025 06:00) (52 - 77)  BP: --  BP(mean): --  RR: 14 (04 Mar 2025 06:00) (14 - 15)  SpO2: 100% (04 Mar 2025 06:00) (98% - 100%)        I&O's Summary    03 Mar 2025 07:01  -  04 Mar 2025 07:00  --------------------------------------------------------  IN: 4685.1 mL / OUT: 9650 mL / NET: -4964.9 mL          ECMO SETTINGS:  Type:		[x] Venovenous		[ ] Venoarterial  Cannulation Site(s):     Flow:  3300	          P1:  199               Delta P: 38  RPM:  4.97	          P2:  161               SVO2: 75    Oxygenator:	Sweep:   1.5  L/min	FiO2: 50    ABG - ( 04 Mar 2025 06:15 )  pH: 7.48  /  pCO2: 54    /  pO2: 102   / HCO3: 40    / Base Excess: 14.4  /  SaO2: 98.4                  VENTILATOR SETTINGS:     Mode: AC/ CMV (Assist Control/ Continuous Mandatory Ventilation)  RR (machine): 14  FiO2: 40  PEEP: 24  ITime: 1  MAP: 28  PC: 12  PIP: 36          Physical Exam:   Constitutional: ill appearing, no acute distress   HEENT: + PERRLA, EOMI, no drainage or redness  Neck: supple,  No JVD, Right IJ ecmo cannula in place   Respiratory: Ventilator assisted breath Sounds diminished bilaterally to auscultation, no accessory muscle use noted  Cardiovascular: Regular rate, regular rhythm, normal S1, S2; no murmurs or rub  Gastrointestinal: Obese, soft, non distended, no hepatosplenomegaly, normal bowel sounds  Extremities: + 2 peripheral edema, no cyanosis, no clubbing   Vascular: Equal and normal pulses: 2+ peripheral pulses throughout  Neurological: sedated/intubated  Musculoskeletal: No joint swelling or deformity; no limitation of movement  Skin: warm, dry, well perfused, cellulitis to pannus area, DTI to right buttocks         LABS:                          12.1   18.64 )-----------( 131      ( 04 Mar 2025 03:45 )             44.6                          03-04    146[H]  |  105  |  40[H]  ----------------------------<  169[H]  4.1   |  34[H]  |  0.96    Ca    8.3[L]      04 Mar 2025 03:45  Phos  3.8     03-04  Mg     2.20     03-04    TPro  6.7  /  Alb  2.8[L]  /  TBili  1.1  /  DBili  x   /  AST  67[H]  /  ALT  130[H]  /  AlkPhos  61  03-04      LIVER FUNCTIONS - ( 04 Mar 2025 03:45 )  Alb: 2.8 g/dL / Pro: 6.7 g/dL / ALK PHOS: 61 U/L / ALT: 130 U/L / AST: 67 U/L / GGT: x             PT/INR - ( 04 Mar 2025 03:45 )   PT: 27.8 sec;   INR: 2.36 ratio         PTT - ( 04 Mar 2025 03:45 )  PTT:60.2 sec      Culture - Fungal, Bronchial (collected 01 Mar 2025 12:45)  Source: Bronchial Bronchial Lavage  Preliminary Report (03 Mar 2025 10:47):    No growth    Culture - Acid Fast - Bronchial w/Smear (collected 01 Mar 2025 12:45)  Source: Bronchial Bronchial Lavage    Culture - Bronchial (collected 01 Mar 2025 12:45)  Source: Bronchial Bronchial Lavage  Gram Stain (01 Mar 2025 21:53):    Few polymorphonuclear leukocytes per low power field    No Squamous epithelial cells per low power field    No organisms seen per oil power field  Final Report (03 Mar 2025 11:59):    No growth to date          ACTIVE MEDS:    MEDICATIONS  (STANDING):  albuterol/ipratropium for Nebulization 3 milliLiter(s) Nebulizer every 6 hours  argatroban Infusion 1.094 MICROgram(s)/kG/Min (15.8 mL/Hr) IV Continuous <Continuous>  chlorhexidine 0.12% Liquid 15 milliLiter(s) Oral Mucosa every 12 hours  chlorhexidine 2% Cloths 1 Application(s) Topical <User Schedule>  clotrimazole 1% Cream 1 Application(s) Topical two times a day  dexAMETHasone  IVPB 10 milliGRAM(s) IV Intermittent daily  dexMEDEtomidine Infusion 1 MICROgram(s)/kG/Hr (60.2 mL/Hr) IV Continuous <Continuous>  dextrose 50% Injectable 25 Gram(s) IV Push once  dextrose 50% Injectable 12.5 Gram(s) IV Push once  dextrose 50% Injectable 25 Gram(s) IV Push once  furosemide Infusion 5 mG/Hr (2.5 mL/Hr) IV Continuous <Continuous>  glucagon  Injectable 1 milliGRAM(s) IntraMuscular once  HYDROmorphone Infusion. 1 mG/Hr (1 mL/Hr) IV Continuous <Continuous>  insulin lispro (ADMELOG) corrective regimen sliding scale   SubCutaneous every 6 hours  multivitamin/minerals/iron Oral Solution (CENTRUM) 15 milliLiter(s) Oral daily  pantoprazole  Injectable 40 milliGRAM(s) IV Push daily  petrolatum Ophthalmic Ointment 1 Application(s) Both EYES every 12 hours  piperacillin/tazobactam IVPB.. 4.5 Gram(s) IV Intermittent every 8 hours  polyethylene glycol 3350 17 Gram(s) Oral two times a day  propofol Infusion 25 MICROgram(s)/kG/Min (36.1 mL/Hr) IV Continuous <Continuous>  QUEtiapine 12.5 milliGRAM(s) Oral two times a day  senna Syrup 10 milliLiter(s) Oral two times a day  sodium chloride 3%  Inhalation 4 milliLiter(s) Inhalation every 6 hours

## 2025-03-04 NOTE — DISCHARGE NOTE PROVIDER - PROVIDER TOKENS
FREE:[LAST:[Your Primary Care Provider],PHONE:[(   )    -],FAX:[(   )    -],FOLLOWUP:[1 week]] FREE:[LAST:[Your Primary Care Provider],PHONE:[(   )    -],FAX:[(   )    -],FOLLOWUP:[1 week]],FREE:[LAST:[[facility]],PHONE:[phone]],FAX:[(   )    -],ADDRESS:[[address] [place] [address]]]

## 2025-03-04 NOTE — DISCHARGE NOTE PROVIDER - NSDCCPCAREPLAN_GEN_ALL_CORE_FT
PRINCIPAL DISCHARGE DIAGNOSIS  Diagnosis: Acute respiratory distress syndrome (ARDS)  Assessment and Plan of Treatment: You were admitted for pneumonia complicated by ARDS requiring mechanical ventilation and eventually ECMO. You have been weaned off the ventilator and EMCO. Your trach tube was removed as was your PEG tube. You are now breathing on your own on room air. Continue BIPAP use at night time and during naps.  BIPAP SETTINGS:  IPAP 18  EPAP 10  RR 12  FIO2 40%      SECONDARY DISCHARGE DIAGNOSES  Diagnosis: Acute deep vein thrombosis (DVT)  Assessment and Plan of Treatment: You were found with clots in the vein in your neck and within your legs. Continue Eliquis to treat this.    Diagnosis: ELLA (acute kidney injury)  Assessment and Plan of Treatment: You were found with acute renal failure. Your lasix and vancomycin were stopped and this has now resolved.    Diagnosis: Sacral osteomyelitis  Assessment and Plan of Treatment: You are being treated for sacral wound OM. Continue Zosyn through 5/11/25 to complete treatment.    Diagnosis: Neuropathy  Assessment and Plan of Treatment: You were treated for critical illness polyneuropathy. Continue pain regimen that you were treated with while inpatient.     PRINCIPAL DISCHARGE DIAGNOSIS  Diagnosis: Acute respiratory distress syndrome (ARDS)  Assessment and Plan of Treatment: You were admitted for pneumonia complicated by ARDS requiring mechanical ventilation and eventually ECMO. You have been weaned off the ventilator and EMCO. Your trach tube was removed as was your PEG tube. You are now breathing on your own on room air. Continue BIPAP use at night time and during naps.  BIPAP SETTINGS:  IPAP 18  EPAP 10  RR 12  FIO2 40%      SECONDARY DISCHARGE DIAGNOSES  Diagnosis: Acute deep vein thrombosis (DVT)  Assessment and Plan of Treatment: You were found with clots in the vein in your neck and within your legs. Continue Eliquis to treat this.    Diagnosis: ELLA (acute kidney injury)  Assessment and Plan of Treatment: You were found with acute renal failure. Your lasix and vancomycin were stopped and this has now resolved.    Diagnosis: Sacral osteomyelitis  Assessment and Plan of Treatment: You are being treated for sacral wound OM. Continue Zosyn through 5/11/25 to complete treatment.    Diagnosis: Neuropathy  Assessment and Plan of Treatment: You were treated for critical illness polyneuropathy. Continue pain regimen that you were treated with while inpatient.    Diagnosis: Visit for wound care  Assessment and Plan of Treatment: WOUND CARE RECS  -Topical recommendations:   ·LUQ surgical wound previous site of PEG tube and right groin: Clean wound and periwound skin with NS. Pat dry. Cover with small silicone foam with border. Change every other day or prn if soiled   ·Sacral/gluteal cleft to bilateral buttocks- Irrigate deep cavity and tunneling with Dakins solution 1/4 strength using peribottle or flushes, cleanse wound and satellite ulcerations to b/l buttocks with NS, Dry well. Apply Liquid barrier film to periwound skin. Apply TRIAD barrier paste to isolated ulcer overlying left buttock,  Apply Aquacel hydrofiber sheet to right buttock, pack sacral/gluteal cleft including deep tunnels with Dakins 1/4 strength moistened kerlix, leave at least 2" out at end to ensure full removal of packing with subsequent dressing changes. Cover entire area (sacrum and right buttock) with abdominal pad and tegaderm. Change twice a day and prn if soiled/compromised. Once dressing is in place and perineal care is provided, clean remaining skin with perineal spray, apply TRIAD moisture barrier cream to exposed skin every shift and prn.  ·Bilateral abdominal pannus, bilateral groin: Cleanse with luke-warm soap and water, dry well. Apply Interdry textile sheeting, under intertriginous folds leaving 2 inches exposed at ends to wick, remove to wash & dry affected area, then replace. Individual sheeting may be used for up to 5 days unless soiled. Inspect skin daily.   -Continue to offload pressure; bariatric low airloss support surface, turn and position per protocol with use of fluidized positioning devices, continue incontinence and moisture care per protocol and use of single breathable incontinence pads, continue to offload heels with fluidized positioning devices/Ochoa-Lock positioning device (PS# [id number]). Continue use of bariatric seat cushion (people soft #[id number]) and limit sit time- no more than 2 consecutive hours at any given time.   May require Wound Vac as outpatient per Wound Care Team.

## 2025-03-04 NOTE — CONSULT NOTE ADULT - ASSESSMENT
39M PMHx class III obesity, pAfib (no AC), HTN, BEA (on CPAP), pseudotumor cerebri s/p LP who is transferred for VV ECMO for ARDS and severe hypoxia. Patient initially presented to [place] on 2/24 for SOB and b/l LE edema where he was intubated but remained hypoxic requiring VV ECMO. While in MICU, patient noted to have b/l plantar blisters. Vascular consulted to rule out possible ischemic etiology.     Rec  - no acute vascular intervention, b/l LEs WWP, good cap refill, and with +pedal signals  - will f/u STEPHEN/PVR findings  - c/w LWC  - care per MICU  - discussed with fellow    C Team [id number]  Daryl Gonzalez MD (PGY2)

## 2025-03-04 NOTE — PROGRESS NOTE ADULT - CRITICAL CARE ATTENDING COMMENT
Pt seen and examined. 39 year old M with medical hx as noted above now on VV ECMO support 2/2 ARDS in the setting of multifocal bacterial pneumonia, decompensated RV failure, ELLA 2/2 pre-renal ATN, transaminitis and lactic acidosis. Required pushes of versed overnight due to agitation. QTc improved, resume Seroquel BID and cont to monitor QTc. Remains on Precedex, Propofol and Dilaudid gtts. Titrate down sedation as tolerated. Remains on pressure control ventilation with FiO2 of 40 %, PEEP titrated down to 22. FUP CXR to confirm ETT position. ECMO circuit FiO2 titrated down to 40 %, sweep at 1.5. FUP ABG and cont to titrate down as tolerated. Remains on Zosyn IV for multifocal PNA and cellulitis of abdominal wall. Cxs including BAL Cxs so far unrevealing. Cont pulm toilet and IPV to mobilize secretions. BP stable off a Cardene gtt cont close monitoring of hemodynamics. Remains anasarcic, -5 L over past 24 hours, cont albumin assisted diuresis with a Lasix gtt, follow electrolytes Q6H. Serum bicarb trending up, may require Diamox. Cont AC with argatroban gtt, Hb stable, mild thrombocytopenia noted, cont to monitor hemolysis panel. Ongoing abdominal distension without BMs, despite Relistor and bowel regimen, abdominal x-ray with non obstructive pattern. Trial of Reglan for promotility. Overall prognosis extremely guarded. Remains critically ill requiring multiple bedside visits and changes in therapy. Full code. Mom and family updated in detail.     Total time spent on VV ECMO management was 20 minutes, separate from time spent on critical care management, procedures, and teaching.

## 2025-03-04 NOTE — CHART NOTE - NSCHARTNOTEFT_GEN_A_CORE
______________ CRITICAL CARE NUTRITION FOLLOW-UP ________________        --Medical Course--  39y  Male with Hx of class III , HTN, pseudotumor cerebri.  Pt. presented with hypoxia, intubated.    Cannulated for VV-ECMO & transferred from [facility] (2/25) to [facility] MICU for further management.    Pt. also with DM, and findings of pulmonary HTN in setting of possible BEA/OHS, c/b PNA, ARDS and pulmonary edema.   CT abd/pelvis shoes hepatomegaly, mild ascites and edema.  Receiving intermittent diuresis.      Pt. currently remains intubated/sedated on VV-ECMO circuit. Propofol rate had decreased, now increased back to 43.3mL/hr which will provide ~1143 KCals at this rate if maintained x 24hrs.       Remains with prolonged constipation.  Still no recent bowel movement.  On bowel regimen, including initiation of Lactulose.  Also ordered for additional free water 250mL q 6hrs.    Spoke with RN and NP.        --Nutrition Course--  Pt. continues to receive Peptamen  @ rate of 35mL/hr.  Recent previous nutrition recommendations had suggested to increase goal rate to 40mL/hr, as well increasing Liquid Protein Supplement (LPS) to 4 packets per day to more appropriately meet energy/protein estimates.  Hence, Pt. with inadequate protein intake (<75%) since admission (x 6d)  Weight decreasing, which may be somewhat fluid related given edema & diuresis.  Previously 4+ generalized, currently 3+ generalized.  Pt. at high risk for malnutrition.      At this time, even with consideration of Propofol, maintain rate of 35mL/hr but increase to 4 packets Liquid Protein Supplement (LPS) to more adequately meet protein requirements.  Enteral formula will give total of:  840mL volume,   840 KCals (+ 400 KCals from LPS) = 1240 KCals (+ Propofol= 2383 KCals)  77g protein (+ 60g additional protein from LPS) = 137g protein  672mL free water (+ free water flushes) = 1672mL H20 volume    TF + LPS will equate to 18 KCals/kg Ideal Body Weight, however with Propofol will give 35 KCals/kg Ideal Body Weight.    Provides 2.0g protein/kg Ideal Body Weight.    Also suggest liquid multivitamin.  Informed providers of recommendations.         __________Diet Order_________  Diet, NPO with Tube Feed:   Tube Feeding Modality: Orogastric  Peptamen Intense VHP  Total Volume for 24 Hours (mL): 840  Continuous  Starting Tube Feed Rate {mL per Hour}: 10  Increase Tube Feed Rate by (mL): 5     Every 4 hours  Until Goal Tube Feed Rate (mL per Hour): 35  Tube Feed Duration (in Hours): 24  Tube Feed Start Time: 00:00  Liquid Protein Supplement     Qty per Day:  3 (02-26-25 @ 13:44) [Active]    Provides:   840 KCals (+ 300 KCals from LPS) =1140 KCals    77g protein (+ 45g additional protein via LPS)= 122g protein         Weight:      Height:   67"  170.2cm           Ideal Body Weight: 148lbs / 67.3kg  221.6kg (3/4)  233.9kg (2/28)  240.8kg (2/26 on admit)        _____Estimated Energy Needs (based on IBW)_____  22-25 KCals/kg = 9063-9703 KCals/d  2.0-2.5g protein/kg = 134-168g protein/d      Edema:  Skin: R buttock suspected deep tissue injury, L foot/toe and R foot/toes with arterial wounds    ________________________Pertinent Medications____________   MEDICATIONS  (STANDING):  albuterol/ipratropium for Nebulization 3 milliLiter(s) Nebulizer every 6 hours  argatroban Infusion 1.094 MICROgram(s)/kG/Min (15.8 mL/Hr) IV Continuous <Continuous>  chlorhexidine 0.12% Liquid 15 milliLiter(s) Oral Mucosa every 12 hours  chlorhexidine 2% Cloths 1 Application(s) Topical <User Schedule>  clotrimazole 1% Cream 1 Application(s) Topical two times a day  dexAMETHasone  IVPB 10 milliGRAM(s) IV Intermittent daily  dexMEDEtomidine Infusion 1 MICROgram(s)/kG/Hr (60.2 mL/Hr) IV Continuous <Continuous>  dextrose 50% Injectable 25 Gram(s) IV Push once  dextrose 50% Injectable 12.5 Gram(s) IV Push once  dextrose 50% Injectable 25 Gram(s) IV Push once  furosemide   Injectable 40 milliGRAM(s) IV Push every 12 hours  glucagon  Injectable 1 milliGRAM(s) IntraMuscular once  HYDROmorphone Infusion. 1 mG/Hr (1 mL/Hr) IV Continuous <Continuous>  insulin lispro (ADMELOG) corrective regimen sliding scale   SubCutaneous every 6 hours  lactulose Syrup 10 Gram(s) Oral every 3 hours  multivitamin/minerals/iron Oral Solution (CENTRUM) 15 milliLiter(s) Oral daily  pantoprazole  Injectable 40 milliGRAM(s) IV Push daily  petrolatum Ophthalmic Ointment 1 Application(s) Both EYES every 12 hours  piperacillin/tazobactam IVPB.. 4.5 Gram(s) IV Intermittent every 8 hours  polyethylene glycol 3350 17 Gram(s) Oral two times a day  propofol Infusion 25 MICROgram(s)/kG/Min (36.1 mL/Hr) IV Continuous <Continuous>  QUEtiapine 25 milliGRAM(s) Oral every 12 hours  senna Syrup 10 milliLiter(s) Oral two times a day  sodium chloride 3%  Inhalation 4 milliLiter(s) Inhalation every 6 hours    MEDICATIONS  (PRN):          _________________________Pertinent Labs____________________     03-04    147[H]  |  106  |  41[H]  ----------------------------<  188[H]  3.7   |  32[H]  |  0.82    Ca    8.4      04 Mar 2025 10:59  Phos  3.2     03-04  Mg     2.20     03-04          CAPILLARY BLOOD GLUCOSE      POCT Blood Glucose.: 154 mg/dL (03-04-25 @ 11:22)  POCT Blood Glucose.: 133 mg/dL (03-04-25 @ 05:09)  POCT Blood Glucose.: 156 mg/dL (03-03-25 @ 23:01)  POCT Blood Glucose.: 184 mg/dL (03-03-25 @ 16:25)      Triglycerides, Serum: 147 mg/dL (03-04-25 @ 03:45)  Triglycerides, Serum: 146 mg/dL (03-03-25 @ 03:45)  Triglycerides, Serum: 164 mg/dL (03-02-25 @ 03:40)  Triglycerides, Serum: 176 mg/dL (03-01-25 @ 03:30)  Triglycerides, Serum: 192 mg/dL (02-28-25 @ 04:28)  Triglycerides, Serum: 186 mg/dL (02-27-25 @ 23:05)  Triglycerides, Serum: 223 mg/dL (02-27-25 @ 04:50)  Triglycerides, Serum: 217 mg/dL (02-27-25 @ 00:10)  Triglycerides, Serum: 207 mg/dL (02-26-25 @ 11:15)          PLAN/RECOMMENDATIONS:    1) Peptamen VHP at goal of 35mL/hr x 24hrs.   Increase Liquid Protein Supplement (LPS) to 4 packets per day  2) Obtain daily weights  3) Monitor tolerance to tube feeding, GI status, electrolytes, glucose  4) Additional free water flushes deferred to providers.  5) Order liquid multivitamin   6) Maintain bowel regimen.    RDN remains available and will f/u PRN.          Linette Escobar RDN, CDN       pager [phone] or MS Teams

## 2025-03-04 NOTE — DISCHARGE NOTE PROVIDER - NSDCFUSCHEDAPPT_GEN_ALL_CORE_FT
Ruben Espinoza  [facility]  [address]  Scheduled Appointment: 03/31/2025

## 2025-03-04 NOTE — DISCHARGE NOTE PROVIDER - NSDCFUADDAPPT_GEN_ALL_CORE_FT
Podiatry Discharge Instructions:  Follow up: Please follow up with Dr. Waterhouse within 1 week of discharge from the hospital, please call [phone] for appointment and discuss that you recently were seen in the hospital.  Wound Care: Please apply aquacel ag to bilateral foot wounds followed by 4x4 gauze, ABD pad, and matilde daily  Weight bearing: Please weight bear as tolerated in a surgical shoe.  Antibiotics: Please continue as instructed. Podiatry Discharge Instructions:  Follow up: Please follow up with Dr. Waterhouse within 1 week of discharge from the hospital, please call [phone] for appointment and discuss that you recently were seen in the hospital.  Wound Care: Please apply betadine to bilateral foot wounds followed by 4x4 gauze, ABD pad, and matilde daily  Weight bearing: Please weight bear as tolerated in a surgical shoe.  Antibiotics: Please continue as instructed.

## 2025-03-04 NOTE — DISCHARGE NOTE PROVIDER - CARE PROVIDER_API CALL
Your Primary Care Provider,   Phone: (   )    -  Fax: (   )    -  Follow Up Time: 1 week   Your Primary Care Provider,   Phone: (   )    -  Fax: (   )    -  Follow Up Time: 1 week    [facility] [place],   [address]  Phone: [phone]  Fax: (   )    -  Follow Up Time:

## 2025-03-04 NOTE — DISCHARGE NOTE PROVIDER - NSDCMRMEDTOKEN_GEN_ALL_CORE_FT
Lasix 20 mg oral tablet: 1 tab(s) orally once a day   acetaminophen 325 mg oral tablet: 2 tab(s) orally every 6 hours As needed Temp greater or equal to 38C (100.4F), Mild Pain (1 - 3), Moderate Pain (4 - 6)  amLODIPine 10 mg oral tablet: 1 tab(s) orally once a day  apixaban 5 mg oral tablet: 1 tab(s) orally every 12 hours  bisacodyl 5 mg oral delayed release tablet: 2 tab(s) orally once a day (at bedtime)  capsaicin 0.075% topical cream: Apply topically to affected area 4 times a day 1 Apply topically to affected area 4 times a day to bilateral feet. Avoid use on damaged, broken, or irritated skin. Avoid occlusive dressing or heat  clotrimazole 1% topical cream: Apply topically to affected area 2 times a day 1 Apply topically to affected area 2 times a day under pannus  cyclobenzaprine 5 mg oral tablet: 1 tab(s) orally 3 times a day As needed Muscle Spasm  DULoxetine 60 mg oral delayed release capsule: 1 cap(s) orally once a day  gabapentin 400 mg oral capsule: 3 cap(s) orally every 8 hours  insulin lispro 100 units/mL injectable solution: 1 unit(s) injectable once a day 0 Unit(s) if Glucose 0 - 250  1 Unit(s) if Glucose 251 - 300  2 Unit(s) if Glucose 301 - 350  3 Unit(s) if Glucose 351 - 400  4 Unit(s) if Glucose 400  Subcutaneous at bedtime  insulin lispro 100 units/mL injectable solution: 1 unit(s) injectable once a day 1 Unit(s) if Glucose 151 - 200  2 Unit(s) if Glucose 201 - 250  3 Unit(s) if Glucose 251 - 300  4 Unit(s) if Glucose 301 - 350  5 Unit(s) if Glucose 351 - 400  6 Unit(s) if Glucose Greater Than 400  Three times daily before meals, subcutaneously  ipratropium-albuterol 0.5 mg-2.5 mg/3 mL inhalation solution: 3 milliliter(s) inhaled every 6 hours As needed Shortness of Breath and/or Wheezing  lidocaine 4% topical film: Apply topically to affected area once a day Left thigh pain  lidocaine 4% topical film: Apply topically to affected area once a day Right thigh pain  melatonin 3 mg oral tablet: 1 tab(s) orally once a day (at bedtime)  Multiple Vitamins oral tablet: 1 tab(s) orally once a day  mupirocin 2% topical ointment: Apply topically to affected area 2 times a day 1 Apply topically to affected area 2 times a day to  R foot wound  naloxegol 25 mg oral tablet: 1 tab(s) orally once a day  oxyCODONE 15 mg oral tablet: 1 tab(s) orally every 6 hours As needed Severe Pain (7 - 10)  oxyCODONE 5 mg oral tablet: 1 tab(s) orally 2 times a day As needed Severe Pain (7 - 10) related to dressing change  pantoprazole 40 mg oral delayed release tablet: 1 tab(s) orally once a day (before a meal)  piperacillin-tazobactam: 3.375 gram(s) intravenous every 8 hours Through 5/11/25 for sacral OM.  polyethylene glycol 3350 oral powder for reconstitution: 17 gram(s) orally 2 times a day  QUEtiapine 25 mg oral tablet: 1 tab(s) orally once a day (at bedtime)  sevelamer carbonate 800 mg oral tablet: 1 tab(s) orally 3 times a day (with meals)  sodium hypochlorite 0.125% topical solution: 1 Apply topically to affected area 2 times a day

## 2025-03-04 NOTE — CONSULT NOTE ADULT - ASSESSMENT
39M presents with bilateral foot plantar blisters  - Patient seen and evaluated  - Afebrile, WBC 15.16  - Bilateral plantar lateral forefoot serous blisters, no acute signs of infection.  - After obtaining verbal consent, lanced bilateral foot serous blister with sterile 15 blade, mild serous drainage. Patient tolerated procedure well.   - No culture secondary to no acute signs of infection   - No acute pod intervention, local wound care only   - Wound care instruction and follow up information in discharge note provider   - Discussed with attending

## 2025-03-05 ENCOUNTER — RESULT REVIEW (OUTPATIENT)
Age: 40
End: 2025-03-05

## 2025-03-05 LAB
ALBUMIN SERPL ELPH-MCNC: 2.9 G/DL — LOW (ref 3.3–5)
ALBUMIN SERPL ELPH-MCNC: 3 G/DL — LOW (ref 3.3–5)
ALP SERPL-CCNC: 54 U/L — SIGNIFICANT CHANGE UP (ref 40–120)
ALP SERPL-CCNC: 55 U/L — SIGNIFICANT CHANGE UP (ref 40–120)
ALP SERPL-CCNC: 56 U/L — SIGNIFICANT CHANGE UP (ref 40–120)
ALP SERPL-CCNC: 58 U/L — SIGNIFICANT CHANGE UP (ref 40–120)
ALT FLD-CCNC: 110 U/L — HIGH (ref 4–41)
ALT FLD-CCNC: 116 U/L — HIGH (ref 4–41)
ALT FLD-CCNC: 118 U/L — HIGH (ref 4–41)
ALT FLD-CCNC: 122 U/L — HIGH (ref 4–41)
ANION GAP SERPL CALC-SCNC: 10 MMOL/L — SIGNIFICANT CHANGE UP (ref 7–14)
ANION GAP SERPL CALC-SCNC: 12 MMOL/L — SIGNIFICANT CHANGE UP (ref 7–14)
APTT BLD: 57.8 SEC — HIGH (ref 24.5–35.6)
APTT BLD: 61.4 SEC — HIGH (ref 24.5–35.6)
APTT BLD: 62.2 SEC — HIGH (ref 24.5–35.6)
APTT BLD: 62.7 SEC — HIGH (ref 24.5–35.6)
AST SERPL-CCNC: 44 U/L — HIGH (ref 4–40)
AST SERPL-CCNC: 50 U/L — HIGH (ref 4–40)
AST SERPL-CCNC: 51 U/L — HIGH (ref 4–40)
AST SERPL-CCNC: 56 U/L — HIGH (ref 4–40)
BASOPHILS # BLD AUTO: 0.01 K/UL — SIGNIFICANT CHANGE UP (ref 0–0.2)
BASOPHILS NFR BLD AUTO: 0.1 % — SIGNIFICANT CHANGE UP (ref 0–2)
BILIRUB SERPL-MCNC: 1 MG/DL — SIGNIFICANT CHANGE UP (ref 0.2–1.2)
BLOOD GAS ARTERIAL - LYTES,HGB,ICA,LACT RESULT: SIGNIFICANT CHANGE UP
BLOOD GAS ECMO POST MEMBRANE - ARTERIAL RESULT: SIGNIFICANT CHANGE UP
BLOOD GAS ECMO PRE MEMBRANE - VENOUS RESULT: SIGNIFICANT CHANGE UP
BUN SERPL-MCNC: 33 MG/DL — HIGH (ref 7–23)
BUN SERPL-MCNC: 35 MG/DL — HIGH (ref 7–23)
BUN SERPL-MCNC: 35 MG/DL — HIGH (ref 7–23)
BUN SERPL-MCNC: 36 MG/DL — HIGH (ref 7–23)
CA-I BLD-SCNC: 1.1 MMOL/L — LOW (ref 1.15–1.29)
CA-I BLD-SCNC: 1.13 MMOL/L — LOW (ref 1.15–1.29)
CA-I BLD-SCNC: 1.2 MMOL/L — SIGNIFICANT CHANGE UP (ref 1.15–1.29)
CALCIUM SERPL-MCNC: 8.4 MG/DL — SIGNIFICANT CHANGE UP (ref 8.4–10.5)
CALCIUM SERPL-MCNC: 8.4 MG/DL — SIGNIFICANT CHANGE UP (ref 8.4–10.5)
CALCIUM SERPL-MCNC: 8.5 MG/DL — SIGNIFICANT CHANGE UP (ref 8.4–10.5)
CALCIUM SERPL-MCNC: 8.5 MG/DL — SIGNIFICANT CHANGE UP (ref 8.4–10.5)
CHLORIDE SERPL-SCNC: 106 MMOL/L — SIGNIFICANT CHANGE UP (ref 98–107)
CHLORIDE SERPL-SCNC: 107 MMOL/L — SIGNIFICANT CHANGE UP (ref 98–107)
CHLORIDE SERPL-SCNC: 109 MMOL/L — HIGH (ref 98–107)
CHLORIDE SERPL-SCNC: 110 MMOL/L — HIGH (ref 98–107)
CO2 SERPL-SCNC: 27 MMOL/L — SIGNIFICANT CHANGE UP (ref 22–31)
CO2 SERPL-SCNC: 28 MMOL/L — SIGNIFICANT CHANGE UP (ref 22–31)
CO2 SERPL-SCNC: 28 MMOL/L — SIGNIFICANT CHANGE UP (ref 22–31)
CO2 SERPL-SCNC: 30 MMOL/L — SIGNIFICANT CHANGE UP (ref 22–31)
CREAT SERPL-MCNC: 0.73 MG/DL — SIGNIFICANT CHANGE UP (ref 0.5–1.3)
CREAT SERPL-MCNC: 0.75 MG/DL — SIGNIFICANT CHANGE UP (ref 0.5–1.3)
CREAT SERPL-MCNC: 0.81 MG/DL — SIGNIFICANT CHANGE UP (ref 0.5–1.3)
CREAT SERPL-MCNC: 0.82 MG/DL — SIGNIFICANT CHANGE UP (ref 0.5–1.3)
EGFR: 116 ML/MIN/1.73M2 — SIGNIFICANT CHANGE UP
EGFR: 119 ML/MIN/1.73M2 — SIGNIFICANT CHANGE UP
EGFR: 119 ML/MIN/1.73M2 — SIGNIFICANT CHANGE UP
EGFR: 120 ML/MIN/1.73M2 — SIGNIFICANT CHANGE UP
EGFR: 120 ML/MIN/1.73M2 — SIGNIFICANT CHANGE UP
EOSINOPHIL # BLD AUTO: 0 K/UL — SIGNIFICANT CHANGE UP (ref 0–0.5)
EOSINOPHIL NFR BLD AUTO: 0 % — SIGNIFICANT CHANGE UP (ref 0–6)
GLUCOSE BLDC GLUCOMTR-MCNC: 147 MG/DL — HIGH (ref 70–99)
GLUCOSE BLDC GLUCOMTR-MCNC: 155 MG/DL — HIGH (ref 70–99)
GLUCOSE BLDC GLUCOMTR-MCNC: 191 MG/DL — HIGH (ref 70–99)
GLUCOSE BLDC GLUCOMTR-MCNC: 226 MG/DL — HIGH (ref 70–99)
GLUCOSE SERPL-MCNC: 151 MG/DL — HIGH (ref 70–99)
GLUCOSE SERPL-MCNC: 182 MG/DL — HIGH (ref 70–99)
GLUCOSE SERPL-MCNC: 185 MG/DL — HIGH (ref 70–99)
GLUCOSE SERPL-MCNC: 191 MG/DL — HIGH (ref 70–99)
GRAM STN FLD: SIGNIFICANT CHANGE UP
HAPTOGLOB SERPL-MCNC: <20 MG/DL — LOW (ref 34–200)
HCT VFR BLD CALC: 39.4 % — SIGNIFICANT CHANGE UP (ref 39–50)
HCT VFR BLD CALC: 40.1 % — SIGNIFICANT CHANGE UP (ref 39–50)
HCT VFR BLD CALC: 40.3 % — SIGNIFICANT CHANGE UP (ref 39–50)
HCT VFR BLD CALC: 42.6 % — SIGNIFICANT CHANGE UP (ref 39–50)
HGB BLD-MCNC: 11.1 G/DL — LOW (ref 13–17)
HGB BLD-MCNC: 11.2 G/DL — LOW (ref 13–17)
HGB BLD-MCNC: 11.3 G/DL — LOW (ref 13–17)
HGB BLD-MCNC: 11.7 G/DL — LOW (ref 13–17)
IANC: 12.51 K/UL — HIGH (ref 1.8–7.4)
IANC: 12.63 K/UL — HIGH (ref 1.8–7.4)
IANC: 12.96 K/UL — HIGH (ref 1.8–7.4)
IANC: 13.23 K/UL — HIGH (ref 1.8–7.4)
IMM GRANULOCYTES NFR BLD AUTO: 0.5 % — SIGNIFICANT CHANGE UP (ref 0–0.9)
IMM GRANULOCYTES NFR BLD AUTO: 0.6 % — SIGNIFICANT CHANGE UP (ref 0–0.9)
IMM GRANULOCYTES NFR BLD AUTO: 0.7 % — SIGNIFICANT CHANGE UP (ref 0–0.9)
IMM GRANULOCYTES NFR BLD AUTO: 0.8 % — SIGNIFICANT CHANGE UP (ref 0–0.9)
INR BLD: 1.81 RATIO — HIGH (ref 0.85–1.16)
INR BLD: 1.95 RATIO — HIGH (ref 0.85–1.16)
INR BLD: 2.07 RATIO — HIGH (ref 0.85–1.16)
INR BLD: 2.26 RATIO — HIGH (ref 0.85–1.16)
LDH SERPL L TO P-CCNC: 524 U/L — HIGH (ref 135–225)
LYMPHOCYTES # BLD AUTO: 0.62 K/UL — LOW (ref 1–3.3)
LYMPHOCYTES # BLD AUTO: 0.64 K/UL — LOW (ref 1–3.3)
LYMPHOCYTES # BLD AUTO: 0.72 K/UL — LOW (ref 1–3.3)
LYMPHOCYTES # BLD AUTO: 0.97 K/UL — LOW (ref 1–3.3)
LYMPHOCYTES # BLD AUTO: 4.3 % — LOW (ref 13–44)
LYMPHOCYTES # BLD AUTO: 4.3 % — LOW (ref 13–44)
LYMPHOCYTES # BLD AUTO: 5 % — LOW (ref 13–44)
LYMPHOCYTES # BLD AUTO: 6.5 % — LOW (ref 13–44)
MAGNESIUM SERPL-MCNC: 2.1 MG/DL — SIGNIFICANT CHANGE UP (ref 1.6–2.6)
MAGNESIUM SERPL-MCNC: 2.1 MG/DL — SIGNIFICANT CHANGE UP (ref 1.6–2.6)
MAGNESIUM SERPL-MCNC: 2.2 MG/DL — SIGNIFICANT CHANGE UP (ref 1.6–2.6)
MAGNESIUM SERPL-MCNC: 2.2 MG/DL — SIGNIFICANT CHANGE UP (ref 1.6–2.6)
MCHC RBC-ENTMCNC: 22.7 PG — LOW (ref 27–34)
MCHC RBC-ENTMCNC: 22.9 PG — LOW (ref 27–34)
MCHC RBC-ENTMCNC: 22.9 PG — LOW (ref 27–34)
MCHC RBC-ENTMCNC: 23 PG — LOW (ref 27–34)
MCHC RBC-ENTMCNC: 27.5 G/DL — LOW (ref 32–36)
MCHC RBC-ENTMCNC: 27.9 G/DL — LOW (ref 32–36)
MCHC RBC-ENTMCNC: 28 G/DL — LOW (ref 32–36)
MCHC RBC-ENTMCNC: 28.2 G/DL — LOW (ref 32–36)
MCV RBC AUTO: 81.2 FL — SIGNIFICANT CHANGE UP (ref 80–100)
MCV RBC AUTO: 81.3 FL — SIGNIFICANT CHANGE UP (ref 80–100)
MCV RBC AUTO: 81.7 FL — SIGNIFICANT CHANGE UP (ref 80–100)
MCV RBC AUTO: 83.9 FL — SIGNIFICANT CHANGE UP (ref 80–100)
MONOCYTES # BLD AUTO: 0.81 K/UL — SIGNIFICANT CHANGE UP (ref 0–0.9)
MONOCYTES # BLD AUTO: 0.95 K/UL — HIGH (ref 0–0.9)
MONOCYTES # BLD AUTO: 1.15 K/UL — HIGH (ref 0–0.9)
MONOCYTES # BLD AUTO: 1.19 K/UL — HIGH (ref 0–0.9)
MONOCYTES NFR BLD AUTO: 5.6 % — SIGNIFICANT CHANGE UP (ref 2–14)
MONOCYTES NFR BLD AUTO: 6.4 % — SIGNIFICANT CHANGE UP (ref 2–14)
MONOCYTES NFR BLD AUTO: 7.7 % — SIGNIFICANT CHANGE UP (ref 2–14)
MONOCYTES NFR BLD AUTO: 8.2 % — SIGNIFICANT CHANGE UP (ref 2–14)
NEUTROPHILS # BLD AUTO: 12.51 K/UL — HIGH (ref 1.8–7.4)
NEUTROPHILS # BLD AUTO: 12.63 K/UL — HIGH (ref 1.8–7.4)
NEUTROPHILS # BLD AUTO: 12.96 K/UL — HIGH (ref 1.8–7.4)
NEUTROPHILS # BLD AUTO: 13.23 K/UL — HIGH (ref 1.8–7.4)
NEUTROPHILS NFR BLD AUTO: 85.2 % — HIGH (ref 43–77)
NEUTROPHILS NFR BLD AUTO: 86.1 % — HIGH (ref 43–77)
NEUTROPHILS NFR BLD AUTO: 88.5 % — HIGH (ref 43–77)
NEUTROPHILS NFR BLD AUTO: 89.2 % — HIGH (ref 43–77)
NIGHT BLUE STAIN TISS: SIGNIFICANT CHANGE UP
NRBC # BLD AUTO: 0 K/UL — SIGNIFICANT CHANGE UP (ref 0–0)
NRBC # FLD: 0 K/UL — SIGNIFICANT CHANGE UP (ref 0–0)
NRBC BLD AUTO-RTO: 0 /100 WBCS — SIGNIFICANT CHANGE UP (ref 0–0)
PHOSPHATE SERPL-MCNC: 3.5 MG/DL — SIGNIFICANT CHANGE UP (ref 2.5–4.5)
PHOSPHATE SERPL-MCNC: 3.6 MG/DL — SIGNIFICANT CHANGE UP (ref 2.5–4.5)
PHOSPHATE SERPL-MCNC: 4.3 MG/DL — SIGNIFICANT CHANGE UP (ref 2.5–4.5)
PHOSPHATE SERPL-MCNC: 4.3 MG/DL — SIGNIFICANT CHANGE UP (ref 2.5–4.5)
PLATELET # BLD AUTO: 104 K/UL — LOW (ref 150–400)
PLATELET # BLD AUTO: 110 K/UL — LOW (ref 150–400)
PLATELET # BLD AUTO: 112 K/UL — LOW (ref 150–400)
PLATELET # BLD AUTO: 113 K/UL — LOW (ref 150–400)
POTASSIUM SERPL-MCNC: 3.8 MMOL/L — SIGNIFICANT CHANGE UP (ref 3.5–5.3)
POTASSIUM SERPL-MCNC: 4 MMOL/L — SIGNIFICANT CHANGE UP (ref 3.5–5.3)
POTASSIUM SERPL-MCNC: 4 MMOL/L — SIGNIFICANT CHANGE UP (ref 3.5–5.3)
POTASSIUM SERPL-MCNC: 4.2 MMOL/L — SIGNIFICANT CHANGE UP (ref 3.5–5.3)
POTASSIUM SERPL-SCNC: 3.8 MMOL/L — SIGNIFICANT CHANGE UP (ref 3.5–5.3)
POTASSIUM SERPL-SCNC: 4 MMOL/L — SIGNIFICANT CHANGE UP (ref 3.5–5.3)
POTASSIUM SERPL-SCNC: 4 MMOL/L — SIGNIFICANT CHANGE UP (ref 3.5–5.3)
POTASSIUM SERPL-SCNC: 4.2 MMOL/L — SIGNIFICANT CHANGE UP (ref 3.5–5.3)
PROT SERPL-MCNC: 6.1 G/DL — SIGNIFICANT CHANGE UP (ref 6–8.3)
PROT SERPL-MCNC: 6.2 G/DL — SIGNIFICANT CHANGE UP (ref 6–8.3)
PROT SERPL-MCNC: 6.3 G/DL — SIGNIFICANT CHANGE UP (ref 6–8.3)
PROT SERPL-MCNC: 6.4 G/DL — SIGNIFICANT CHANGE UP (ref 6–8.3)
PROTHROM AB SERPL-ACNC: 21.4 SEC — HIGH (ref 9.9–13.4)
PROTHROM AB SERPL-ACNC: 23.1 SEC — HIGH (ref 9.9–13.4)
PROTHROM AB SERPL-ACNC: 24.4 SEC — HIGH (ref 9.9–13.4)
PROTHROM AB SERPL-ACNC: 26.7 SEC — HIGH (ref 9.9–13.4)
RBC # BLD: 4.85 M/UL — SIGNIFICANT CHANGE UP (ref 4.2–5.8)
RBC # BLD: 4.93 M/UL — SIGNIFICANT CHANGE UP (ref 4.2–5.8)
RBC # BLD: 4.93 M/UL — SIGNIFICANT CHANGE UP (ref 4.2–5.8)
RBC # BLD: 5.08 M/UL — SIGNIFICANT CHANGE UP (ref 4.2–5.8)
RBC # FLD: 21 % — HIGH (ref 10.3–14.5)
RBC # FLD: 21 % — HIGH (ref 10.3–14.5)
RBC # FLD: 21.1 % — HIGH (ref 10.3–14.5)
RBC # FLD: 21.1 % — HIGH (ref 10.3–14.5)
SODIUM SERPL-SCNC: 144 MMOL/L — SIGNIFICANT CHANGE UP (ref 135–145)
SODIUM SERPL-SCNC: 146 MMOL/L — HIGH (ref 135–145)
SODIUM SERPL-SCNC: 147 MMOL/L — HIGH (ref 135–145)
SODIUM SERPL-SCNC: 150 MMOL/L — HIGH (ref 135–145)
SPECIMEN SOURCE: SIGNIFICANT CHANGE UP
SPECIMEN SOURCE: SIGNIFICANT CHANGE UP
TRIGL SERPL-MCNC: 155 MG/DL — HIGH
WBC # BLD: 14.51 K/UL — HIGH (ref 3.8–10.5)
WBC # BLD: 14.52 K/UL — HIGH (ref 3.8–10.5)
WBC # BLD: 14.84 K/UL — HIGH (ref 3.8–10.5)
WBC # BLD: 14.93 K/UL — HIGH (ref 3.8–10.5)
WBC # FLD AUTO: 14.51 K/UL — HIGH (ref 3.8–10.5)
WBC # FLD AUTO: 14.52 K/UL — HIGH (ref 3.8–10.5)
WBC # FLD AUTO: 14.84 K/UL — HIGH (ref 3.8–10.5)
WBC # FLD AUTO: 14.93 K/UL — HIGH (ref 3.8–10.5)

## 2025-03-05 PROCEDURE — 99254 IP/OBS CNSLTJ NEW/EST MOD 60: CPT

## 2025-03-05 PROCEDURE — 93925 LOWER EXTREMITY STUDY: CPT | Mod: 26

## 2025-03-05 PROCEDURE — 99291 CRITICAL CARE FIRST HOUR: CPT | Mod: 25

## 2025-03-05 PROCEDURE — 31622 DX BRONCHOSCOPE/WASH: CPT | Mod: GC

## 2025-03-05 PROCEDURE — 99292 CRITICAL CARE ADDL 30 MIN: CPT | Mod: 25

## 2025-03-05 PROCEDURE — 33948 ECMO/ECLS DAILY MGMT-VENOUS: CPT

## 2025-03-05 PROCEDURE — 99233 SBSQ HOSP IP/OBS HIGH 50: CPT

## 2025-03-05 PROCEDURE — 93308 TTE F-UP OR LMTD: CPT | Mod: 26,GC

## 2025-03-05 PROCEDURE — 71045 X-RAY EXAM CHEST 1 VIEW: CPT | Mod: 26,76

## 2025-03-05 PROCEDURE — 76604 US EXAM CHEST: CPT | Mod: 26,GC

## 2025-03-05 RX ORDER — ALBUMIN (HUMAN) 12.5 G/50ML
250 INJECTION, SOLUTION INTRAVENOUS ONCE
Refills: 0 | Status: COMPLETED | OUTPATIENT
Start: 2025-03-05 | End: 2025-03-05

## 2025-03-05 RX ORDER — LACTULOSE 10 G/15ML
200 SOLUTION ORAL ONCE
Refills: 0 | Status: DISCONTINUED | OUTPATIENT
Start: 2025-03-05 | End: 2025-03-05

## 2025-03-05 RX ORDER — FUROSEMIDE 10 MG/ML
40 INJECTION INTRAMUSCULAR; INTRAVENOUS EVERY 8 HOURS
Refills: 0 | Status: DISCONTINUED | OUTPATIENT
Start: 2025-03-05 | End: 2025-03-05

## 2025-03-05 RX ORDER — MIDAZOLAM IN 0.9 % SOD.CHLORID 1 MG/ML
8 PLASTIC BAG, INJECTION (ML) INTRAVENOUS ONCE
Refills: 0 | Status: DISCONTINUED | OUTPATIENT
Start: 2025-03-05 | End: 2025-03-05

## 2025-03-05 RX ORDER — NICARDIPINE HCL 30 MG
5 CAPSULE ORAL
Qty: 40 | Refills: 0 | Status: DISCONTINUED | OUTPATIENT
Start: 2025-03-05 | End: 2025-03-07

## 2025-03-05 RX ORDER — NALOXEGOL OXALATE 12.5 MG/1
12.5 TABLET, FILM COATED ORAL DAILY
Refills: 0 | Status: DISCONTINUED | OUTPATIENT
Start: 2025-03-06 | End: 2025-03-06

## 2025-03-05 RX ORDER — FUROSEMIDE 10 MG/ML
40 INJECTION INTRAMUSCULAR; INTRAVENOUS EVERY 6 HOURS
Refills: 0 | Status: DISCONTINUED | OUTPATIENT
Start: 2025-03-05 | End: 2025-03-07

## 2025-03-05 RX ORDER — FENTANYL CITRATE-0.9 % NACL/PF 100MCG/2ML
100 SYRINGE (ML) INTRAVENOUS ONCE
Refills: 0 | Status: DISCONTINUED | OUTPATIENT
Start: 2025-03-05 | End: 2025-03-05

## 2025-03-05 RX ORDER — NICARDIPINE HCL 30 MG
2.5 CAPSULE ORAL
Qty: 40 | Refills: 0 | Status: DISCONTINUED | OUTPATIENT
Start: 2025-03-05 | End: 2025-03-05

## 2025-03-05 RX ADMIN — FUROSEMIDE 40 MILLIGRAM(S): 10 INJECTION INTRAMUSCULAR; INTRAVENOUS at 14:29

## 2025-03-05 RX ADMIN — Medication 1 MG/HR: at 19:13

## 2025-03-05 RX ADMIN — FUROSEMIDE 40 MILLIGRAM(S): 10 INJECTION INTRAMUSCULAR; INTRAVENOUS at 17:42

## 2025-03-05 RX ADMIN — DEXMEDETOMIDINE HYDROCHLORIDE IN SODIUM CHLORIDE 60.2 MICROGRAM(S)/KG/HR: 4 INJECTION INTRAVENOUS at 19:13

## 2025-03-05 RX ADMIN — Medication 25 GRAM(S): at 21:34

## 2025-03-05 RX ADMIN — LACTULOSE 10 GRAM(S): 10 SOLUTION ORAL at 05:12

## 2025-03-05 RX ADMIN — PROPOFOL 36.1 MICROGRAM(S)/KG/MIN: 10 INJECTION, EMULSION INTRAVENOUS at 07:32

## 2025-03-05 RX ADMIN — Medication 15 MILLILITER(S): at 05:12

## 2025-03-05 RX ADMIN — Medication 1 APPLICATION(S): at 05:13

## 2025-03-05 RX ADMIN — Medication 1 MG/HR: at 07:32

## 2025-03-05 RX ADMIN — ARGATROBAN 15.9 MICROGRAM(S)/KG/MIN: 100 INJECTION, SOLUTION INTRAVENOUS at 19:13

## 2025-03-05 RX ADMIN — Medication 15 MILLILITER(S): at 12:01

## 2025-03-05 RX ADMIN — PROPOFOL 36.1 MICROGRAM(S)/KG/MIN: 10 INJECTION, EMULSION INTRAVENOUS at 02:01

## 2025-03-05 RX ADMIN — FUROSEMIDE 40 MILLIGRAM(S): 10 INJECTION INTRAMUSCULAR; INTRAVENOUS at 23:14

## 2025-03-05 RX ADMIN — Medication 25 MG/HR: at 23:39

## 2025-03-05 RX ADMIN — Medication 4 MILLILITER(S): at 03:20

## 2025-03-05 RX ADMIN — CLOTRIMAZOLE 1 APPLICATION(S): 1 CREAM TOPICAL at 17:08

## 2025-03-05 RX ADMIN — PROPOFOL 36.1 MICROGRAM(S)/KG/MIN: 10 INJECTION, EMULSION INTRAVENOUS at 21:48

## 2025-03-05 RX ADMIN — IPRATROPIUM BROMIDE AND ALBUTEROL SULFATE 3 MILLILITER(S): .5; 2.5 SOLUTION RESPIRATORY (INHALATION) at 20:24

## 2025-03-05 RX ADMIN — IPRATROPIUM BROMIDE AND ALBUTEROL SULFATE 3 MILLILITER(S): .5; 2.5 SOLUTION RESPIRATORY (INHALATION) at 03:20

## 2025-03-05 RX ADMIN — Medication 4 MILLILITER(S): at 15:55

## 2025-03-05 RX ADMIN — DEXMEDETOMIDINE HYDROCHLORIDE IN SODIUM CHLORIDE 60.2 MICROGRAM(S)/KG/HR: 4 INJECTION INTRAVENOUS at 02:01

## 2025-03-05 RX ADMIN — Medication 4 MILLILITER(S): at 09:02

## 2025-03-05 RX ADMIN — Medication 12.5 MG/HR: at 14:29

## 2025-03-05 RX ADMIN — PROPOFOL 36.1 MICROGRAM(S)/KG/MIN: 10 INJECTION, EMULSION INTRAVENOUS at 06:07

## 2025-03-05 RX ADMIN — Medication 4 MILLILITER(S): at 20:24

## 2025-03-05 RX ADMIN — Medication 25 MILLIGRAM(S): at 14:28

## 2025-03-05 RX ADMIN — INSULIN LISPRO 4: 100 INJECTION, SOLUTION INTRAVENOUS; SUBCUTANEOUS at 17:07

## 2025-03-05 RX ADMIN — QUETIAPINE FUMARATE 25 MILLIGRAM(S): 25 TABLET ORAL at 17:07

## 2025-03-05 RX ADMIN — Medication 15 MILLILITER(S): at 17:07

## 2025-03-05 RX ADMIN — Medication 10 MILLILITER(S): at 05:12

## 2025-03-05 RX ADMIN — FUROSEMIDE 40 MILLIGRAM(S): 10 INJECTION INTRAMUSCULAR; INTRAVENOUS at 05:13

## 2025-03-05 RX ADMIN — LACTULOSE 10 GRAM(S): 10 SOLUTION ORAL at 08:22

## 2025-03-05 RX ADMIN — Medication 1 APPLICATION(S): at 17:07

## 2025-03-05 RX ADMIN — INSULIN LISPRO 2: 100 INJECTION, SOLUTION INTRAVENOUS; SUBCUTANEOUS at 23:14

## 2025-03-05 RX ADMIN — Medication 50 MILLIEQUIVALENT(S): at 02:01

## 2025-03-05 RX ADMIN — ARGATROBAN 15.9 MICROGRAM(S)/KG/MIN: 100 INJECTION, SOLUTION INTRAVENOUS at 07:32

## 2025-03-05 RX ADMIN — PROPOFOL 36.1 MICROGRAM(S)/KG/MIN: 10 INJECTION, EMULSION INTRAVENOUS at 19:13

## 2025-03-05 RX ADMIN — Medication 100 MICROGRAM(S): at 10:13

## 2025-03-05 RX ADMIN — PROPOFOL 36.1 MICROGRAM(S)/KG/MIN: 10 INJECTION, EMULSION INTRAVENOUS at 03:42

## 2025-03-05 RX ADMIN — Medication 8 MILLIGRAM(S): at 10:13

## 2025-03-05 RX ADMIN — IPRATROPIUM BROMIDE AND ALBUTEROL SULFATE 3 MILLILITER(S): .5; 2.5 SOLUTION RESPIRATORY (INHALATION) at 09:01

## 2025-03-05 RX ADMIN — Medication 25 MILLIGRAM(S): at 21:34

## 2025-03-05 RX ADMIN — POLYETHYLENE GLYCOL 3350 17 GRAM(S): 17 POWDER, FOR SOLUTION ORAL at 17:07

## 2025-03-05 RX ADMIN — LACTULOSE 10 GRAM(S): 10 SOLUTION ORAL at 02:00

## 2025-03-05 RX ADMIN — Medication 25 GRAM(S): at 14:29

## 2025-03-05 RX ADMIN — CLOTRIMAZOLE 1 APPLICATION(S): 1 CREAM TOPICAL at 05:13

## 2025-03-05 RX ADMIN — INSULIN LISPRO 2: 100 INJECTION, SOLUTION INTRAVENOUS; SUBCUTANEOUS at 12:01

## 2025-03-05 RX ADMIN — DEXAMETHASONE 102 MILLIGRAM(S): 0.5 TABLET ORAL at 05:12

## 2025-03-05 RX ADMIN — Medication 10 MILLILITER(S): at 17:08

## 2025-03-05 RX ADMIN — POLYETHYLENE GLYCOL 3350 17 GRAM(S): 17 POWDER, FOR SOLUTION ORAL at 05:12

## 2025-03-05 RX ADMIN — ALBUMIN (HUMAN) 125 MILLILITER(S): 12.5 INJECTION, SOLUTION INTRAVENOUS at 10:11

## 2025-03-05 RX ADMIN — Medication 25 GRAM(S): at 05:13

## 2025-03-05 RX ADMIN — DEXMEDETOMIDINE HYDROCHLORIDE IN SODIUM CHLORIDE 60.2 MICROGRAM(S)/KG/HR: 4 INJECTION INTRAVENOUS at 07:32

## 2025-03-05 RX ADMIN — QUETIAPINE FUMARATE 25 MILLIGRAM(S): 25 TABLET ORAL at 05:12

## 2025-03-05 RX ADMIN — Medication 40 MILLIGRAM(S): at 12:01

## 2025-03-05 RX ADMIN — IPRATROPIUM BROMIDE AND ALBUTEROL SULFATE 3 MILLILITER(S): .5; 2.5 SOLUTION RESPIRATORY (INHALATION) at 15:31

## 2025-03-05 NOTE — PROGRESS NOTE ADULT - SUBJECTIVE AND OBJECTIVE BOX
SUBJECTIVE AND OBJECTIVE:  Indication for Geriatrics and Palliative Care Services/INTERVAL HPI:      38 yo M w/ class III obesity, pAfib (no AC), HTN, BEA (on CPAP), history of b/l papilledema attributed to pseudotumor cerebri s/p LP in 2024 with very high opening pressure, who is transferred for VV ECMO for ARDS and severe hypoxia. Patient cannulated for VV ECMO on 2/25 and transferred to [facility] for further management. Palliative care consulted for goals of care and complex medical decision making; patient currently on ECMO.    OVERNIGHT EVENTS:  Pt seen for follow up in MICU; remains on ECMO circuit, sedation being weaned, plan for trach in OR   Pt's father, Sunny Willard. Was in the bedside  He shared they understand plan of care from speaking to the ICU team; family appropriately worried but shared family overall doing okay and guided by their [place];  Emotional support provided and reassured that palliative team remains available for ongoing support      Allergies    No Known Allergies    Intolerances    MEDICATIONS  (STANDING):  albuterol/ipratropium for Nebulization 3 milliLiter(s) Nebulizer every 6 hours  argatroban Infusion 1.1 MICROgram(s)/kG/Min (15.9 mL/Hr) IV Continuous <Continuous>  chlorhexidine 0.12% Liquid 15 milliLiter(s) Oral Mucosa every 12 hours  chlorhexidine 2% Cloths 1 Application(s) Topical <User Schedule>  clotrimazole 1% Cream 1 Application(s) Topical two times a day  dexAMETHasone  IVPB 10 milliGRAM(s) IV Intermittent daily  dexMEDEtomidine Infusion 1 MICROgram(s)/kG/Hr (60.2 mL/Hr) IV Continuous <Continuous>  dextrose 50% Injectable 25 Gram(s) IV Push once  dextrose 50% Injectable 12.5 Gram(s) IV Push once  dextrose 50% Injectable 25 Gram(s) IV Push once  furosemide   Injectable 40 milliGRAM(s) IV Push every 8 hours  glucagon  Injectable 1 milliGRAM(s) IntraMuscular once  HYDROmorphone Infusion. 1 mG/Hr (1 mL/Hr) IV Continuous <Continuous>  insulin lispro (ADMELOG) corrective regimen sliding scale   SubCutaneous every 6 hours  multivitamin/minerals/iron Oral Solution (CENTRUM) 15 milliLiter(s) Oral daily  niCARdipine Infusion 2.5 mG/Hr (12.5 mL/Hr) IV Continuous <Continuous>  pantoprazole  Injectable 40 milliGRAM(s) IV Push daily  petrolatum Ophthalmic Ointment 1 Application(s) Both EYES every 12 hours  piperacillin/tazobactam IVPB.. 4.5 Gram(s) IV Intermittent every 8 hours  polyethylene glycol 3350 17 Gram(s) Oral two times a day  propofol Infusion 25 MICROgram(s)/kG/Min (36.1 mL/Hr) IV Continuous <Continuous>  QUEtiapine 25 milliGRAM(s) Oral every 12 hours  senna Syrup 10 milliLiter(s) Oral two times a day  sodium chloride 3%  Inhalation 4 milliLiter(s) Inhalation every 6 hours    MEDICATIONS  (PRN):      ITEMS UNCHECKED ARE NOT PRESENT    PRESENT SYMPTOMS: [X]Unable to self-report - see  CPOT, PAINADS, RDOS below  Source if other than patient:  [ ]Family   [ ]Team     Pain:  [ ]yes [ ]no  QOL impact -   Location -                    Aggravating factors -  Quality -  Radiation -  Timing-  Severity (0-10 scale):  Minimal acceptable level (0-10 scale):     PCSSQ[Palliative Care Spiritual Screening Question]   Severity (0-10):  Score of 4 or > indicate consideration of Chaplaincy referral.  Chaplaincy Referral: [ ] yes [ ] refused [ ] following    Caregiver [place]? : [X] yes [ ] no [ ] deferred:  Social work referral [X] Patient & Family Centered Care Referral [ ]     Anticipatory Grief Present?: [X] yes [ ] no  [ ] deferred: Social work referral [X]  Patient & Family Centered Care Referral [ ]     SYMPTOMS: Unable to obtain due to medical acuity  Dyspnea:                           [ ]Mild [ ]Moderate [ ]Severe  Anxiety:                             [ ]Mild [ ]Moderate [ ]Severe  Fatigue:                             [ ]Mild [ ]Moderate [ ]Severe  Nausea/Vomiting:              [ ]Mild [ ]Moderate [ ]Severe  Loss of appetite:                [ ]Mild [ ]Moderate [ ]Severe  Constipation:                     [ ]Mild [ ]Moderate [ ]Severe    Other Symptoms:  [ ]All other review of systems negative     PHYSICAL EXAM:  Vital Signs Last 24 Hrs  T(C): 35.8 (05 Mar 2025 12:00), Max: 35.9 (05 Mar 2025 08:00)  T(F): 96.4 (05 Mar 2025 12:00), Max: 96.6 (05 Mar 2025 08:00)  HR: 77 (05 Mar 2025 13:35) (54 - 98)  BP: --  BP(mean): --  RR: 22 (05 Mar 2025 13:35) (12 - 22)  SpO2: 98% (05 Mar 2025 13:35) (94% - 100%)    Parameters below as of 05 Mar 2025 12:00  Patient On (Oxygen Delivery Method): ventilator    O2 Concentration (%):      GENERAL: [ ]Cachexia    [ ]Alert  [ ]Oriented x   [ ]Lethargic  [ ]Unarousable  [ ]Verbal  [X]Non-Verbal  Behavioral:   [ ] Anxiety  [ ] Delirium [ ] Agitation [X] Other Sedated  HEENT:  [ ]Normal   [ ]Dry mouth   [ ]ET Tube/Trach  [ ]Oral lesions  PULMONARY: - Intubated   [X]Clear [ ]Tachypnea  [ ]Audible excessive secretions   [ ]Rhonchi        [ ]Right [ ]Left [ ]Bilateral  [ ]Crackles        [ ]Right [ ]Left [ ]Bilateral  [ ]Wheezing     [ ]Right [ ]Left [ ]Bilateral  [ ]Diminished breath sounds [ ]right [ ]left [ ]bilateral  CARDIOVASCULAR:    [X]Regular [ ]Irregular [ ]Tachy  [ ]Parviz [ ]Murmur [ ]Other  GASTROINTESTINAL:  [X]Soft  [ ]Distended   [ ]+BS  [ ]Non tender [ ]Tender  [ ]Other [ ]PEG [ ]OGT/ NGT  Last BM: Unknown  GENITOURINARY:  [ ]Normal [ ] Incontinent   [ ]Oliguria/Anuria   [ ][place]  MUSCULOSKELETAL:   [ ]Normal   [ ]Weakness  [X]Bed/Wheelchair bound [ ]Edema  NEUROLOGIC:   [ ]No focal deficits  [ ]Cognitive impairment  [ ]Dysphagia [ ]Dysarthria [ ]Paresis [X]Other Sedated   SKIN:   [ ]Normal  [ ]Rash  [X]Other - Please see RN documentation which I have reviewed  [ ]Pressure ulcer(s)       Present on admission [ ]y [ ]n    CRITICAL CARE:  [ ]Shock Present  [ ]Septic [ ]Cardiogenic [ ]Neurologic [ ]Hypovolemic  [ ]Vasopressors [ ]Inotropes  [ ]Respiratory failure present [x ]Mechanical Ventilation [ ]Non-invasive ventilatory support [ ]High-Flow Mode: AC/ CMV (Assist Control/ Continuous Mandatory Ventilation), RR (machine): 14, FiO2: 50, PEEP: 18, ITime: 1, MAP: 22, PC: 12, PIP: 31  [ ]Acute  [ ]Chronic [ ]Hypoxic  [ ]Hypercarbic [ ]Other  [X]Other organ failure: Lung         LABS:                          11.7   14.51 )-----------( 112      ( 05 Mar 2025 10:05 )             42.6     03-05    144  |  106  |  35[H]  ----------------------------<  191[H]  4.2   |  28  |  0.81    Ca    8.5      05 Mar 2025 10:05  Phos  4.3     03-05  Mg     2.20     03-05    TPro  6.4  /  Alb  2.9[L]  /  TBili  1.0  /  DBili  x   /  AST  51[H]  /  ALT  122[H]  /  AlkPhos  58  03-05    PT/INR - ( 05 Mar 2025 10:05 )   PT: 24.4 sec;   INR: 2.07 ratio         PTT - ( 05 Mar 2025 10:05 )  PTT:62.7 sec        RADIOLOGY & ADDITIONAL STUDIES:  < from: Xray Chest 1 View- PORTABLE-Urgent (Xray Chest 1 View- PORTABLE-Urgent .) (03.05.25 @ 10:56) >  IMPRESSION:  Lines and tubes in appropriate position as described above.    Similar bilateral haziness consistent with layering effusions although   air space opacities from ARDS is not excluded.    < end of copied text >        Protein Calorie Malnutrition Present: [ ]mild [ ]moderate [ ]severe [ ]underweight [ ]morbid obesity  https://www.andeal.org/vault/2440/web/files/ONC/Table_Clinical%20Characteristics%20to%20Document%20Malnutrition-White%20JV%20et%20al%202012.pdf    Height (cm): 170.2 (02-26-25 @ 01:48), 170.2 (02-25-25 @ 16:00), 177.8 (10-05-24 @ 01:35)  Weight (kg): 240.8 (02-26-25 @ 08:00), 239.2 (02-25-25 @ 20:00), 172.4 (10-05-24 @ 01:35)  BMI (kg/m2): 83.1 (02-26-25 @ 08:00), 82.6 (02-26-25 @ 01:48), 82.6 (02-25-25 @ 20:00)    [ ]PPSV2 < or = 30%  [ ]significant weight loss [ ]poor nutritional intake [ ]anasarca[ ]Artificial Nutrition    Other REFERRALS:  [ ]Hospice  [ ]Child Life  [ ]Social Work  [ ]Case management [ ]Holistic Therapy     Palliative Performance Scale:  [email or web address]_performance_[email or web address]_[email or web address].pdf  (Ctrl +  left click to view)  Respiratory Distress Observation Tool:  https://homecareinformation.net/handouts/hen/Respiratory_Distress_Observation_Scale.pdf (Ctrl +  left click to view)  PAINAD Score:  [email or web address]/painad.pdf (Ctrl +  left click to view)

## 2025-03-05 NOTE — PROGRESS NOTE ADULT - SUBJECTIVE AND OBJECTIVE BOX
Interval Events:    REVIEW OF SYSTEMS:  Unable to assess ROS because pt is intubated and sedated    Vital Signs Last 24 Hrs  T(C): 35.8 (05 Mar 2025 07:00), Max: 36.1 (04 Mar 2025 08:00)  T(F): 96.4 (05 Mar 2025 07:00), Max: 97 (04 Mar 2025 08:00)  HR: 61 (05 Mar 2025 07:40) (53 - 101)  BP: --  BP(mean): --  RR: 14 (05 Mar 2025 07:00) (14 - 19)  SpO2: 96% (05 Mar 2025 07:40) (89% - 100%)      Adult Advanced Hemodynamics Last 24 Hrs  CVP(mm Hg): --  CVP(cm H2O): --  CO: --  CI: --  PA: --  PA(mean): --  PCWP: --  SVR: --  SVRI: --  PVR: --  PVRI: --    I&O's Summary    04 Mar 2025 07:01  -  05 Mar 2025 07:00  --------------------------------------------------------  IN: 6131.2 mL / OUT: 7185 mL / NET: -1053.8 mL          ECMO SETTINGS:  Type:		[ ] Venovenous		[ ] Venoarterial  Cannulation Site(s):     Flow:  	          P1:                  Delta P:  RPM: 		  P2:                  SVO2:    Oxygenator:	Sweep:     L/min	FiO2:     ABG - ( 05 Mar 2025 03:30 )  pH: 7.35  /  pCO2: 61    /  pO2: 119   / HCO3: 34    / Base Excess: 6.4   /  SaO2: 98.4                  VENTILATOR SETTINGS:     Mode: AC/ CMV (Assist Control/ Continuous Mandatory Ventilation)  RR (machine): 14  FiO2: 40  PEEP: 22  ITime: 1  MAP: 25  PC: 12  PIP: 34        PHYSICAL EXAM:  General:   HEENT:   Lymph Nodes:  Neck:   Respiratory:   Cardiovascular:   Abdomen:   Extremities:   Skin:   Neurological:  Psychiatry:      LABS:                          11.2   14.84 )-----------( 113      ( 05 Mar 2025 03:30 )             40.1                          03-05    147[H]  |  109[H]  |  36[H]  ----------------------------<  185[H]  4.0   |  28  |  0.82    Ca    8.5      05 Mar 2025 03:30  Phos  4.3     03-05  Mg     2.20     03-05    TPro  6.2  /  Alb  2.9[L]  /  TBili  1.0  /  DBili  x   /  AST  50[H]  /  ALT  118[H]  /  AlkPhos  55  03-05      LIVER FUNCTIONS - ( 05 Mar 2025 03:30 )  Alb: 2.9 g/dL / Pro: 6.2 g/dL / ALK PHOS: 55 U/L / ALT: 118 U/L / AST: 50 U/L / GGT: x             PT/INR - ( 05 Mar 2025 03:30 )   PT: 26.7 sec;   INR: 2.26 ratio         PTT - ( 05 Mar 2025 03:30 )  PTT:62.2 sec          ACTIVE MEDS:    MEDICATIONS  (STANDING):  albuterol/ipratropium for Nebulization 3 milliLiter(s) Nebulizer every 6 hours  argatroban Infusion 1.1 MICROgram(s)/kG/Min (15.9 mL/Hr) IV Continuous <Continuous>  chlorhexidine 0.12% Liquid 15 milliLiter(s) Oral Mucosa every 12 hours  chlorhexidine 2% Cloths 1 Application(s) Topical <User Schedule>  clotrimazole 1% Cream 1 Application(s) Topical two times a day  dexAMETHasone  IVPB 10 milliGRAM(s) IV Intermittent daily  dexMEDEtomidine Infusion 1 MICROgram(s)/kG/Hr (60.2 mL/Hr) IV Continuous <Continuous>  dextrose 50% Injectable 25 Gram(s) IV Push once  dextrose 50% Injectable 12.5 Gram(s) IV Push once  dextrose 50% Injectable 25 Gram(s) IV Push once  furosemide   Injectable 40 milliGRAM(s) IV Push every 12 hours  glucagon  Injectable 1 milliGRAM(s) IntraMuscular once  HYDROmorphone Infusion. 1 mG/Hr (1 mL/Hr) IV Continuous <Continuous>  insulin lispro (ADMELOG) corrective regimen sliding scale   SubCutaneous every 6 hours  lactulose Syrup 10 Gram(s) Oral every 3 hours  multivitamin/minerals/iron Oral Solution (CENTRUM) 15 milliLiter(s) Oral daily  pantoprazole  Injectable 40 milliGRAM(s) IV Push daily  petrolatum Ophthalmic Ointment 1 Application(s) Both EYES every 12 hours  piperacillin/tazobactam IVPB.. 4.5 Gram(s) IV Intermittent every 8 hours  polyethylene glycol 3350 17 Gram(s) Oral two times a day  propofol Infusion 25 MICROgram(s)/kG/Min (36.1 mL/Hr) IV Continuous <Continuous>  QUEtiapine 25 milliGRAM(s) Oral every 12 hours  senna Syrup 10 milliLiter(s) Oral two times a day  sodium chloride 3%  Inhalation 4 milliLiter(s) Inhalation every 6 hours       Interval Events:   -Tolerated sweep 5 fd02 21%  -Dilaudid lowered 0.6        HPI:  38 yo M w/ class III obesity, pAfib (no AC), HTN, BEA (on CPAP), history of b/l papilledema attributed to pseudotumor cerebri s/p LP in 2024 with very high opening pressure, who is transferred for VV ECMO for ARDS and severe hypoxia. Patient initially presented to [facility] on 2/24 for SOB and b/l LE edema. 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 He is a current every day smoker of Newports and marijuana, but does not drink or do other drugs. Currently lives with his mother. There is a long term partner in his life, but they are not , and they have an on/off relationship currently not very involved with each other as per the patient's mother and aunt.  At [facility] where he was getting an eval last year for shortness of breath, he had a sleep study and was diagnosed with sleep apnea, prescribed a PAP machine, which he has in his room but the mother is not sure how many nights per week he uses it. Recently, the last day or two, his mother and brother have been under the weather with URIs and the patient 2 days ago started to develop a ruynny nose, ough, some wheezing of the chest, as well as worsening shortness of breath and fevers at home. He called his aunt who told him to go get checked out and then he landed at the [facility] ED. Patient found to be reportedly hypoxemic to 70% on RA with worsening respiratory status/secretions and somnolence in the ED. Patient was intubated with difficulty (7 attempts) due to very large tongue secretions. Despite optimal vent management, patient remained hypoxemic. Unable to prone due to obesity. Patient cannulated for VV ECMO on 2/25 and transferred to [facility] for further management.     [facility] labs notable for MRSA PCR -, Fluvid -, urine legionella -, sputum gram stain  with GPC and GPR    CTA chest on 2/24 negative for pulmonary embolism, notable for patchy bilateral lower lobe opacities, hepatomegaly, mild ascites, edema/induration of the abdominal pannus.    LE duplex on 2/25 negative for DVT    TTE on 2/25 showed LV EF 61%, enlarged RV with TAPSE 2.1cm, ePASP at least 49 (Patient had 10/2024 TTE with normal LV and RV with ePASP 14).    Only home med is Lasix. Not on acetazolamide for pseudotumor or on Wegovy (was pending auth) (26 Feb 2025 01:48)      REVIEW OF SYSTEMS:  [x ] Unable to assess ROS because patient is intubated/sedated           REVIEW OF SYSTEMS:  Unable to assess ROS because pt is intubated and sedated    Vital Signs Last 24 Hrs  T(C): 35.8 (05 Mar 2025 07:00), Max: 36.1 (04 Mar 2025 08:00)  T(F): 96.4 (05 Mar 2025 07:00), Max: 97 (04 Mar 2025 08:00)  HR: 61 (05 Mar 2025 07:40) (53 - 101)  BP: --  BP(mean): --  RR: 14 (05 Mar 2025 07:00) (14 - 19)  SpO2: 96% (05 Mar 2025 07:40) (89% - 100%)      Adult Advanced Hemodynamics Last 24 Hrs  CVP(mm Hg): --  CVP(cm H2O): --  CO: --  CI: --  PA: --  PA(mean): --  PCWP: --  SVR: --  SVRI: --  PVR: --  PVRI: --    I&O's Summary    04 Mar 2025 07:01  -  05 Mar 2025 07:00  --------------------------------------------------------  IN: 6131.2 mL / OUT: 7185 mL / NET: -1053.8 mL          ECMO SETTINGS:  Type:		[ ] Venovenous		[ ] Venoarterial  Cannulation Site(s):     Flow:  	          P1:                  Delta P:  RPM: 		  P2:                  SVO2:    Oxygenator:	Sweep:     L/min	FiO2:     ABG - ( 05 Mar 2025 03:30 )  pH: 7.35  /  pCO2: 61    /  pO2: 119   / HCO3: 34    / Base Excess: 6.4   /  SaO2: 98.4                  VENTILATOR SETTINGS:     Mode: AC/ CMV (Assist Control/ Continuous Mandatory Ventilation)  RR (machine): 14  FiO2: 40  PEEP: 22  ITime: 1  MAP: 25  PC: 12  PIP: 34      Physical Exam:   Constitutional: ill appearing, no acute distress   HEENT: + PERRLA, EOMI, no drainage or redness  Neck: supple,  No JVD, Right IJ ecmo cannula in place   Respiratory: Ventilator assisted breath Sounds diminished bilaterally to auscultation, no accessory muscle use noted  Cardiovascular: Regular rate, regular rhythm, normal S1, S2; no murmurs or rub  Gastrointestinal: Obese, soft, non distended, no hepatosplenomegaly, normal bowel sounds  Extremities: + 2 peripheral edema, no cyanosis, no clubbing   Vascular: Equal and normal pulses: 2+ peripheral pulses throughout  Neurological: sedated/intubated  Musculoskeletal: No joint swelling or deformity; no limitation of movement  Skin: warm, dry, well perfused, cellulitis to pannus area, DTI to right buttocks       LABS:                          11.2   14.84 )-----------( 113      ( 05 Mar 2025 03:30 )             40.1                          03-05    147[H]  |  109[H]  |  36[H]  ----------------------------<  185[H]  4.0   |  28  |  0.82    Ca    8.5      05 Mar 2025 03:30  Phos  4.3     03-05  Mg     2.20     03-05    TPro  6.2  /  Alb  2.9[L]  /  TBili  1.0  /  DBili  x   /  AST  50[H]  /  ALT  118[H]  /  AlkPhos  55  03-05      LIVER FUNCTIONS - ( 05 Mar 2025 03:30 )  Alb: 2.9 g/dL / Pro: 6.2 g/dL / ALK PHOS: 55 U/L / ALT: 118 U/L / AST: 50 U/L / GGT: x             PT/INR - ( 05 Mar 2025 03:30 )   PT: 26.7 sec;   INR: 2.26 ratio         PTT - ( 05 Mar 2025 03:30 )  PTT:62.2 sec          ACTIVE MEDS:    MEDICATIONS  (STANDING):  albuterol/ipratropium for Nebulization 3 milliLiter(s) Nebulizer every 6 hours  argatroban Infusion 1.1 MICROgram(s)/kG/Min (15.9 mL/Hr) IV Continuous <Continuous>  chlorhexidine 0.12% Liquid 15 milliLiter(s) Oral Mucosa every 12 hours  chlorhexidine 2% Cloths 1 Application(s) Topical <User Schedule>  clotrimazole 1% Cream 1 Application(s) Topical two times a day  dexAMETHasone  IVPB 10 milliGRAM(s) IV Intermittent daily  dexMEDEtomidine Infusion 1 MICROgram(s)/kG/Hr (60.2 mL/Hr) IV Continuous <Continuous>  dextrose 50% Injectable 25 Gram(s) IV Push once  dextrose 50% Injectable 12.5 Gram(s) IV Push once  dextrose 50% Injectable 25 Gram(s) IV Push once  furosemide   Injectable 40 milliGRAM(s) IV Push every 12 hours  glucagon  Injectable 1 milliGRAM(s) IntraMuscular once  HYDROmorphone Infusion. 1 mG/Hr (1 mL/Hr) IV Continuous <Continuous>  insulin lispro (ADMELOG) corrective regimen sliding scale   SubCutaneous every 6 hours  lactulose Syrup 10 Gram(s) Oral every 3 hours  multivitamin/minerals/iron Oral Solution (CENTRUM) 15 milliLiter(s) Oral daily  pantoprazole  Injectable 40 milliGRAM(s) IV Push daily  petrolatum Ophthalmic Ointment 1 Application(s) Both EYES every 12 hours  piperacillin/tazobactam IVPB.. 4.5 Gram(s) IV Intermittent every 8 hours  polyethylene glycol 3350 17 Gram(s) Oral two times a day  propofol Infusion 25 MICROgram(s)/kG/Min (36.1 mL/Hr) IV Continuous <Continuous>  QUEtiapine 25 milliGRAM(s) Oral every 12 hours  senna Syrup 10 milliLiter(s) Oral two times a day  sodium chloride 3%  Inhalation 4 milliLiter(s) Inhalation every 6 hours       Interval Events:   -Tolerated minimal settings of ECMO overnight, sweep 0.5 fd02 21%  -Dilaudid lowered 0.6        HPI:  38 yo M w/ class III obesity, pAfib (no AC), HTN, BEA (on CPAP), history of b/l papilledema attributed to pseudotumor cerebri s/p LP in 2024 with very high opening pressure, who is transferred for VV ECMO for ARDS and severe hypoxia. Patient initially presented to [facility] on 2/24 for SOB and b/l LE edema. 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 He is a current every day smoker of Newports and marijuana, but does not drink or do other drugs. Currently lives with his mother. There is a long term partner in his life, but they are not , and they have an on/off relationship currently not very involved with each other as per the patient's mother and aunt.  At [facility] where he was getting an eval last year for shortness of breath, he had a sleep study and was diagnosed with sleep apnea, prescribed a PAP machine, which he has in his room but the mother is not sure how many nights per week he uses it. Recently, the last day or two, his mother and brother have been under the weather with URIs and the patient 2 days ago started to develop a ruynny nose, ough, some wheezing of the chest, as well as worsening shortness of breath and fevers at home. He called his aunt who told him to go get checked out and then he landed at the [facility] ED. Patient found to be reportedly hypoxemic to 70% on RA with worsening respiratory status/secretions and somnolence in the ED. Patient was intubated with difficulty (7 attempts) due to very large tongue secretions. Despite optimal vent management, patient remained hypoxemic. Unable to prone due to obesity. Patient cannulated for VV ECMO on 2/25 and transferred to [facility] for further management.     [facility] labs notable for MRSA PCR -, Fluvid -, urine legionella -, sputum gram stain  with GPC and GPR    CTA chest on 2/24 negative for pulmonary embolism, notable for patchy bilateral lower lobe opacities, hepatomegaly, mild ascites, edema/induration of the abdominal pannus.    LE duplex on 2/25 negative for DVT    TTE on 2/25 showed LV EF 61%, enlarged RV with TAPSE 2.1cm, ePASP at least 49 (Patient had 10/2024 TTE with normal LV and RV with ePASP 14).    Only home med is Lasix. Not on acetazolamide for pseudotumor or on Wegovy (was pending auth) (26 Feb 2025 01:48)      REVIEW OF SYSTEMS:  [x ] Unable to assess ROS because patient is intubated/sedated           REVIEW OF SYSTEMS:  Unable to assess ROS because pt is intubated and sedated    Vital Signs Last 24 Hrs  T(C): 35.8 (05 Mar 2025 07:00), Max: 36.1 (04 Mar 2025 08:00)  T(F): 96.4 (05 Mar 2025 07:00), Max: 97 (04 Mar 2025 08:00)  HR: 61 (05 Mar 2025 07:40) (53 - 101)  BP: --  BP(mean): --  RR: 14 (05 Mar 2025 07:00) (14 - 19)  SpO2: 96% (05 Mar 2025 07:40) (89% - 100%)      Adult Advanced Hemodynamics Last 24 Hrs  CVP(mm Hg): --  CVP(cm H2O): --  CO: --  CI: --  PA: --  PA(mean): --  PCWP: --  SVR: --  SVRI: --  PVR: --  PVRI: --    I&O's Summary    04 Mar 2025 07:01  -  05 Mar 2025 07:00  --------------------------------------------------------  IN: 6131.2 mL / OUT: 7185 mL / NET: -1053.8 mL          ECMO SETTINGS:  Type:		[ ] Venovenous		[ ] Venoarterial  Cannulation Site(s):     Flow:  	          P1:                  Delta P:  RPM: 		  P2:                  SVO2:    Oxygenator:	Sweep:     L/min	FiO2:     ABG - ( 05 Mar 2025 03:30 )  pH: 7.35  /  pCO2: 61    /  pO2: 119   / HCO3: 34    / Base Excess: 6.4   /  SaO2: 98.4                  VENTILATOR SETTINGS:     Mode: AC/ CMV (Assist Control/ Continuous Mandatory Ventilation)  RR (machine): 14  FiO2: 40  PEEP: 22  ITime: 1  MAP: 25  PC: 12  PIP: 34      Physical Exam:   Constitutional: ill appearing, no acute distress   HEENT: + PERRLA, EOMI, no drainage or redness  Neck: supple,  No JVD, Right IJ ecmo cannula in place   Respiratory: Ventilator assisted breath Sounds diminished bilaterally to auscultation, no accessory muscle use noted  Cardiovascular: Regular rate, regular rhythm, normal S1, S2; no murmurs or rub  Gastrointestinal: Obese, soft, non distended, no hepatosplenomegaly, normal bowel sounds  Extremities: + 2 peripheral edema, no cyanosis, no clubbing   Vascular: Equal and normal pulses: 2+ peripheral pulses throughout  Neurological: sedated/intubated  Musculoskeletal: No joint swelling or deformity; no limitation of movement  Skin: warm, dry, well perfused, cellulitis to pannus area, DTI to right buttocks       LABS:                          11.2   14.84 )-----------( 113      ( 05 Mar 2025 03:30 )             40.1                          03-05    147[H]  |  109[H]  |  36[H]  ----------------------------<  185[H]  4.0   |  28  |  0.82    Ca    8.5      05 Mar 2025 03:30  Phos  4.3     03-05  Mg     2.20     03-05    TPro  6.2  /  Alb  2.9[L]  /  TBili  1.0  /  DBili  x   /  AST  50[H]  /  ALT  118[H]  /  AlkPhos  55  03-05      LIVER FUNCTIONS - ( 05 Mar 2025 03:30 )  Alb: 2.9 g/dL / Pro: 6.2 g/dL / ALK PHOS: 55 U/L / ALT: 118 U/L / AST: 50 U/L / GGT: x             PT/INR - ( 05 Mar 2025 03:30 )   PT: 26.7 sec;   INR: 2.26 ratio         PTT - ( 05 Mar 2025 03:30 )  PTT:62.2 sec          ACTIVE MEDS:    MEDICATIONS  (STANDING):  albuterol/ipratropium for Nebulization 3 milliLiter(s) Nebulizer every 6 hours  argatroban Infusion 1.1 MICROgram(s)/kG/Min (15.9 mL/Hr) IV Continuous <Continuous>  chlorhexidine 0.12% Liquid 15 milliLiter(s) Oral Mucosa every 12 hours  chlorhexidine 2% Cloths 1 Application(s) Topical <User Schedule>  clotrimazole 1% Cream 1 Application(s) Topical two times a day  dexAMETHasone  IVPB 10 milliGRAM(s) IV Intermittent daily  dexMEDEtomidine Infusion 1 MICROgram(s)/kG/Hr (60.2 mL/Hr) IV Continuous <Continuous>  dextrose 50% Injectable 25 Gram(s) IV Push once  dextrose 50% Injectable 12.5 Gram(s) IV Push once  dextrose 50% Injectable 25 Gram(s) IV Push once  furosemide   Injectable 40 milliGRAM(s) IV Push every 12 hours  glucagon  Injectable 1 milliGRAM(s) IntraMuscular once  HYDROmorphone Infusion. 1 mG/Hr (1 mL/Hr) IV Continuous <Continuous>  insulin lispro (ADMELOG) corrective regimen sliding scale   SubCutaneous every 6 hours  lactulose Syrup 10 Gram(s) Oral every 3 hours  multivitamin/minerals/iron Oral Solution (CENTRUM) 15 milliLiter(s) Oral daily  pantoprazole  Injectable 40 milliGRAM(s) IV Push daily  petrolatum Ophthalmic Ointment 1 Application(s) Both EYES every 12 hours  piperacillin/tazobactam IVPB.. 4.5 Gram(s) IV Intermittent every 8 hours  polyethylene glycol 3350 17 Gram(s) Oral two times a day  propofol Infusion 25 MICROgram(s)/kG/Min (36.1 mL/Hr) IV Continuous <Continuous>  QUEtiapine 25 milliGRAM(s) Oral every 12 hours  senna Syrup 10 milliLiter(s) Oral two times a day  sodium chloride 3%  Inhalation 4 milliLiter(s) Inhalation every 6 hours

## 2025-03-05 NOTE — PROGRESS NOTE ADULT - CRITICAL CARE ATTENDING COMMENT
Pt seen and examined. 39 year old M with medical hx as noted above now on VV ECMO support 2/2 ARDS in the setting of multifocal bacterial pneumonia, decompensated RV failure, ELLA 2/2 pre-renal ATN, transaminitis and lactic acidosis. Awke and following basic commands during sedation vacation this am. Tolerating Seroquel, cont to titrate down Dilaudid gtt as tolerated, remains on Precedex gtt and Propofol gtts. Follow QTc.  Remains on pressure control ventilation with FiO2 of 40 %, PEEP titrated down to 20. Bedside bronch this am showing small amounts of old blood clots with some cloudy secretions, BAL sent for Cx. ECMO circuit FiO2 titrated down to 21% %, sweep at 0.5 with PaO2 stable at 119 and PCO2 of 61. Remains on Zosyn IV for multifocal PNA and cellulitis of abdominal wall. Cxs including BAL Cxs so far unrevealing, FUP new bronch Cxs. Cont pulm toilet and IPV to mobilize secretions. Ongoing hypertension, intermittently requiring a Cardene gtt. Start hydralazine 25 mg Q8H and cont close monitoring of hemodynamics. Anasraca improving, - 1 L L over past 24 hours, cont albumin assisted diuresis, increase Lasix to 40 mg IV Q8H, follow electrolytes Q6H. Cont AC with argatroban gtt, Hb stable at 11, mild thrombocytopenia noted along with mildly blood tinged urine, cont to monitor hemolysis panel and CBC. Ongoing abdominal distension, had small BM overnight, cont bowel regimen, abdominal x-ray with non obstructive pattern. Noted to have b/l blistering lesions on plantar surfaces of both feet yesterday with surrounding duskiness concerning for possible ischemic etiology. Appreciate podiatry and vascular evalvs, no acute intervention required, monitor pedal pulses and cont wound care. Contact Ct surgery for trach placement. Overall prognosis extremely guarded. Remains critically ill requiring multiple bedside visits and changes in therapy. Full code. Mom and family updated in detail.

## 2025-03-05 NOTE — PROGRESS NOTE ADULT - ASSESSMENT
38 yo M w/ class III obesity, pAfib (no AC), HTN, BEA (on CPAP), history of b/l papilledema attributed to pseudotumor cerebri, who is transferred from [place] on 2/26 for VV ECMO 2/2 ARDS. Patient initially presented to [place] on 2/24 for SOB and b/l LE edema. As per chart review, patient endorses some degree of SOB and b/l LE edema x 3 months with significant weight gain. As per ICU team, patient had positive sick contacts with viral URI 1 to 2 weeks PTA and since then has had worsening SOB. Patient found to be reportedly hypoxemic to 70% on RA with worsening respiratory status/secretions and somnolence in the ED. Patient was a difficult intubation (~6 attempts). Despite optimal vent management, patient remained hypoxemic. Unable to prone due to obesity. Patient cannulated for VV ECMO on 2/25 and transferred to [facility] for further management.     Neuro  #Mental status   #pseudotumor cerebri  - history of pseudotumor cerebri s/p LP under fluoro in 2020 with high opening pressure with MRI 2020 also showed possible pituitary mass but unclear because of pressure effect from pseudotumor cerebri causing partially empty sella. Considering to start acetazolamide but never started  - prolactin high (30), defer checking cortisol axis given already received steroids but should be investigated, ACTH (<1.5) low but should be suppressed iso steroids  - concern for pituitary adenoma, however TSH .79 normal,  fT4 0.9 normal, and low T3 67  - sedated with Dilaudid, low dose propofol, and precedex gtt. Goal to wean dilaudid slightly today as tolerated   - with lowered sedation pt awake, EASLEY, able to follow commands, however became restless pulling at lines and ETT, received versed and dilaudid IVP  - seroquel 25mg BID previously held and dose lowered iso high QTc,  now 466 will resume dose 25mg BID and increase as tolerated   - paralytics d/c'd 2/27   - PT/OT following     Cardiovascular  #HTN   #Atrial fibrillation   - Hx of a. fib not on AC   - echo from 2024 did not demonstrate elevation of pulmonary pressures, but now with severe PH suggesting chronicity on TTE at    - TTE on 2/25 showed LV EF 61%, enlarged RV with TAPSE 2.1cm, ePASP at least 49 (Patient had 10/2024 TTE with normal LV and RV with ePASP 14).  - ROBERT 2/26 showing VTI 17  - ROBERT 3/1 showing mild TR, enlarged RV with normal LV/RV function. PEEP increased to 24 without signs of RV dilation  - troponins initially elevated, peaked at 162 however downtrended   - s/p esmolol drip for high flows now D/C'd may need to consider again if need for higher flows  - cardene gtt started for hypertension held since 3/3  - ECG daily to monitor QTc currently 466    Pulmonary  #Pulmonary HTN  #ARDS  #Multifocal Pneumonia   #BEA   - history of diagnosed BEA/?OHS intermittent compliance to home PAP  - c/f acute on suspected chronic pulmonary hypertension in setting of possible BEA/OHS c/b pneumonia, ARDS, and pulmonary edema   - CTA chest on 2/24 negative for pulmonary embolism, notable for patchy bilateral lower lobe opacities, hepatomegaly, mild ascites, edema/induration of the abdominal pannus.  - Full RVP and BAL cultures negative    - Bronchoscopy demonstrated copious purulent secretions 2/26 and 2/27  - Bronchoscopy 3/1 noted with small amount of old, dried blood at R and L mainstem, not occluding airway.  BAL NGTD  - s/p Dexamethasone 20mg x 5 days, now on 10 mg x5 days and taper as per clinical status pneumonia/ARDS  - C/w duoNeb q6 hours + 3% saline and q12 IPV   - c/w PC/AC PIP:34  RR 14 PC: 12 PEEP:22 Fio2 40% ~500ml  - difficult glottic visualization due to large tongue upon intubation at [facility] 2/25  - ETT migrated x2 most likely post IPV, needed to be pushed in via bronch, will watch closely (should be 28 @lip)    #ECMO  VV ECMO		  Cannulation sites/dates:  Flow: 4.97	       P1: 199		Delta P: 38  RPM: 3300	       P2: 161		SVO2: 75  Sweep: 1.5 L/min:		            FIO2: 0.5      - was requiring high flows for oxygenation causing high delta P but D/V adequate, no lactate and SVO2 WNL with some hemolysis but now resolving   - Clinically perfusing. Lactate: 1.7  - ECMO sweep sighing q1hr   - Adjust circuit flow as tolerated with DO2:VO2 goal >2  - Monitor for hemolysis, chatter, evidence of recirculation, mixing      Gastrointestinal  #Diet   #Transaminitis  - tolerating tube feeds  - elevated Tbili likely iso hemolysis 2/2 high ECMO flows, mild transaminitis now improving  - hepatomegaly and mild ascites noted on CT A/P - no pocket to tap  - RUQ ultrasound with steatosis    - c/w bowel regimen with Senna and Miralax BID  - No bm noted since admission. S/p Relistor now x 5 doses, also gave dose of magnesium citrate, will try reglan, and lactulose 10mg q3 until bm  - triglycerides 147  - nutrition following       Gu/Renal  #ELLA  #Hypernatremia   #Non-AG Metabolic Alkalosis  - ELLA - improved, likely ATN/vascular congestion  - good UO, overall net negative  - c/w free h20 250ml q6 hr for hypernatremia  - s/p intermittent diuresis, started lasix gtt 3/2 to assist with aggressive diuresis, monitor urine and electrolytes closely   - contraction alkalosis likely 2/2 diuretics, given albumin 250ml x1, lasix gtt discontinued 3/4 and placed on 40mg BID, goal net negative -2 Liters    Infectious disease  #leukocytosis  #Pneumonia   #cellulitis   - Leukocytosis now likely iso steroids, remains afebrile  - s/p cefepime, linezolid, and aztihro 2/25-2/26   - s/p vanco (2/26-3/1) and c/w zosyn for a total of ~10 days (2/26-3/8) to cover cellulitis   - full RVP negative  - BCx x 2 2/24 and 2/26 currently NGTD, UCx 2/25 NGTD, tracheal aspirate Cx from 2/25 with normal commensal kyle   - strep pneumo Ag and urine legionella negative   - s/p Mupiricon b/l nares for +MSSA  (2/26-3/3)  - BAL cultures 3/1 currently NGTD, previous BAL 2/26 NGTD  - ? penile meatus yellow discharge, GC/chamydia and HIV negative   - candidal intertrigo +/- cellulitis of the pannus - clotrimazole cream 1% BID 2/26 -+ abx as per above    Hematology/DVT ppx  #Anticoagulation   #Thrombocytopenia  #Anemia   - Hb stable 12.4, thrombocytopenia likely r/t shearing 2/2 ECMO circuit, now improving  - 4T score low probability of HIT, received heparin at OSH, HIT/SHAWANDA sent, f/u  - c/w argatroban with goal 50-70  - LE duplex 2/25 neg  - s/p 1 u PRBC 2/26 for O2 delivery    Endocrine  #DM  -A1c 6.7   - obesity with metabolic syndrome  - diabetes with steroid induced hypoglycemia  - c/w decadron and taper iso ARDS  - pituitary workup, steroids as above given recent weight gain but likely volume though  - c/w FS Q6h and moderate ISS, may need to adjust as tapering steroids    SKin/Lines  #Access  #Cellulitis   #DTI    - L subclavian TLC inserted 2/25   - L radial artery line 2/25  - RIJ ECMO cannula 2/26 at 20 cm  - R fem ECMO cannula 2/26 at 25 cm  - candidal intertrigo +/- cellulitis of the pannus - clotrimazole cream 1% BID 2/26 -+ abx as per above  - as per nursing staff, patient with DTI to right buttocks and B/L feet   - wound consult placed     #Blisters  Blisters noted soles of b/l feet   - VA STEPHEN/PVR evaluate for PAD  - podiatry consulted for worsening of b/l foot blisters- recs vascular consult  - Vascular consult placed- f/u recs  - VA duplex b/l LE           GOC   - full code   - palliative involved  and following while on ECMO   - Mother involved in care and life partner      40 yo M w/ class III obesity, pAfib (no AC), HTN, BEA (on CPAP), history of b/l papilledema attributed to pseudotumor cerebri, who is transferred from [place] on 2/26 for VV ECMO 2/2 ARDS. Patient initially presented to [place] on 2/24 for SOB and b/l LE edema. As per chart review, patient endorses some degree of SOB and b/l LE edema x 3 months with significant weight gain. As per ICU team, patient had positive sick contacts with viral URI 1 to 2 weeks PTA and since then has had worsening SOB. Patient found to be reportedly hypoxemic to 70% on RA with worsening respiratory status/secretions and somnolence in the ED. Patient was a difficult intubation (~6 attempts). Despite optimal vent management, patient remained hypoxemic. Unable to prone due to obesity. Patient cannulated for VV ECMO on 2/25 and transferred to [facility] for further management.     Neuro  #Mental status   #pseudotumor cerebri  - history of pseudotumor cerebri s/p LP under fluoro in 2020 with high opening pressure with MRI 2020 also showed possible pituitary mass but unclear because of pressure effect from pseudotumor cerebri causing partially empty sella. Considering to start acetazolamide but never started  - prolactin high (30), defer checking cortisol axis given already received steroids but should be investigated, ACTH (<1.5) low but should be suppressed iso steroids  - concern for pituitary adenoma, however TSH .79 normal,  fT4 0.9 normal, and low T3 67  - sedated with Dilaudid, low dose propofol, and precedex gtt. Goal to wean dilaudid slightly today as tolerated   - with lowered sedation pt awake, EASLEY, able to follow commands, however became restless pulling at lines and ETT  - continue seroquel 25mg BID to assist with sedation wean, previously held and dose lowered iso high QTc, adjust dose as tolerated   - paralytics d/c'd 2/27   - PT/OT following     Cardiovascular  #HTN   #Atrial fibrillation   - Hx of a. fib not on AC   - echo from 2024 did not demonstrate elevation of pulmonary pressures, but now with severe PH suggesting chronicity on TTE at    - TTE on 2/25 showed LV EF 61%, enlarged RV with TAPSE 2.1cm, ePASP at least 49 (Patient had 10/2024 TTE with normal LV and RV with ePASP 14).  - ROBERT 2/26 showing VTI 17  - ROBERT 3/1 showing mild TR, enlarged RV with normal LV/RV function. PEEP increased to 24 without signs of RV dilation  - troponins initially elevated, peaked at 162 however downtrended   - s/p esmolol drip for high flows now D/C'd may need to consider again if need for higher flows  - cardene gtt started for hypertension, held since 3/3  - ECG daily to monitor QTc currently 466    Pulmonary  #Pulmonary HTN  #ARDS  #Multifocal Pneumonia   #BEA   - history of diagnosed BEA/?OHS intermittent compliance to home PAP  - c/f acute on suspected chronic pulmonary hypertension in setting of possible BEA/OHS c/b pneumonia, ARDS, and pulmonary edema   - CTA chest on 2/24 negative for pulmonary embolism, notable for patchy bilateral lower lobe opacities, hepatomegaly, mild ascites, edema/induration of the abdominal pannus.  - Full RVP and BAL cultures negative    - Bronchoscopy demonstrated copious purulent secretions 2/26 and 2/27  - Bronchoscopy 3/1 and 3/5,  noted with small amount of old, dried blood at R and L mainstem, not occluding airway. follow up BAL  - s/p Dexamethasone 20mg x 5 days, now on 10 mg x5 days and taper as per clinical status pneumonia/ARDS  - C/w duoNeb q6 hours + 3% saline and q12 IPV   - c/w PC/AC PIP:34  RR 14 PC: 12 PEEP:22 Fio2 40% ~500ml  - difficult glottic visualization due to large tongue upon intubation at [facility] 2/25  - ETT migrated x2 most likely post IPV, needed to be pushed in via bronch, will watch closely (should be 28 @lip)    #ECMO  VV ECMO		  Cannulation sites/dates:  Flow: 4.49	       P1: 175		Delta P: 33  RPM: 3090	       P2: 142		SVO2: 83  Sweep: 0.5 L/min:		            FIO2: 0.21      - was requiring high flows for oxygenation causing high delta P but D/V adequate, no lactate and SVO2 WNL with some hemolysis but now resolving   - Clinically perfusing. Lactate: 2.3  - ECMO sweep sighing q1hr   - Adjust circuit flow as tolerated with DO2:VO2 goal >2  - Monitor for hemolysis, chatter, evidence of recirculation, mixing      Gastrointestinal  #Diet   #Transaminitis  - tolerating tube feeds  - elevated Tbili likely iso hemolysis 2/2 high ECMO flows, mild transaminitis now improving  - hepatomegaly and mild ascites noted on CT A/P - no pocket to tap  - RUQ ultrasound with steatosis    - c/w bowel regimen with Senna and Miralax BID  - No bm noted since admission. S/p Relistor now x 5 doses, also gave dose of magnesium citrate, reglan, fleets enema, and lactulose 10mg q3 until bm  - triglycerides 155  - nutrition following       Gu/Renal  #ELLA  #Hypernatremia   #Non-AG Metabolic Alkalosis  - ELLA - improved, likely ATN/vascular congestion  - good UO, overall net negative  - c/w free h20 250ml q6 hr for hypernatremia  - s/p intermittent diuresis, started lasix gtt 3/2 to assist with aggressive diuresis, monitor urine and electrolytes closely   - contraction alkalosis likely 2/2 diuretics, given albumin 250ml x1, lasix gtt discontinued 3/4 and placed on 40mg BID, goal net negative -2 Liters    Infectious disease  #leukocytosis  #Pneumonia   #cellulitis   - Leukocytosis now likely iso steroids, remains afebrile  - s/p cefepime, linezolid, and aztihro 2/25-2/26   - s/p vanco (2/26-3/1) and c/w zosyn for a total of ~10 days (2/26-3/8) to cover cellulitis   - full RVP negative  - BCx x 2 2/24 and 2/26 currently NGTD, UCx 2/25 NGTD, tracheal aspirate Cx from 2/25 with normal commensal kyle   - strep pneumo Ag and urine legionella negative   - s/p Mupiricon b/l nares for +MSSA  (2/26-3/3)  - BAL cultures 3/1 currently NGTD, previous BAL 2/26 NGTD  - ? penile meatus yellow discharge, GC/chamydia and HIV negative   - candidal intertrigo +/- cellulitis of the pannus - clotrimazole cream 1% BID 2/26 -+ abx as per above    Hematology/DVT ppx  #Anticoagulation   #Thrombocytopenia  #Anemia   - Hb stable 12.4, thrombocytopenia likely r/t shearing 2/2 ECMO circuit, now improving  - 4T score low probability of HIT, received heparin at OSH, HIT/SHAWANDA sent, f/u  - c/w argatroban with goal 50-70  - LE duplex 2/25 neg  - s/p 1 u PRBC 2/26 for O2 delivery    Endocrine  #DM  -A1c 6.7   - obesity with metabolic syndrome  - diabetes with steroid induced hypoglycemia  - c/w decadron and taper iso ARDS  - pituitary workup, steroids as above given recent weight gain but likely volume though  - c/w FS Q6h and moderate ISS, may need to adjust as tapering steroids    SKin/Lines  #Access  #Cellulitis   #DTI    - L subclavian TLC inserted 2/25   - L radial artery line 2/25  - RIJ ECMO cannula 2/26 at 20 cm  - R fem ECMO cannula 2/26 at 25 cm  - candidal intertrigo +/- cellulitis of the pannus - clotrimazole cream 1% BID 2/26 -+ abx as per above  - as per nursing staff, patient with DTI to right buttocks and B/L feet   - wound consult placed     #Blisters  Blisters noted soles of b/l feet   - VA STEPHEN/PVR evaluate for PAD  - podiatry consulted for worsening of b/l foot blisters- recs vascular consult  - Vascular consult placed- f/u recs  - VA duplex b/l LE           GOC   - full code   - palliative involved  and following while on ECMO   - Mother involved in care and life partner      38 yo M w/ class III obesity, pAfib (no AC), HTN, BEA (on CPAP), history of b/l papilledema attributed to pseudotumor cerebri, who is transferred from [place] on 2/26 for VV ECMO 2/2 ARDS. Patient initially presented to [place] on 2/24 for SOB and b/l LE edema. As per chart review, patient endorses some degree of SOB and b/l LE edema x 3 months with significant weight gain. As per ICU team, patient had positive sick contacts with viral URI 1 to 2 weeks PTA and since then has had worsening SOB. Patient found to be reportedly hypoxemic to 70% on RA with worsening respiratory status/secretions and somnolence in the ED. Patient was a difficult intubation (~6 attempts). Despite optimal vent management, patient remained hypoxemic. Unable to prone due to obesity. Patient cannulated for VV ECMO on 2/25 and transferred to [facility] for further management.     Neuro  #Mental status   #pseudotumor cerebri  - history of pseudotumor cerebri s/p LP under fluoro in 2020 with high opening pressure with MRI 2020 also showed possible pituitary mass but unclear because of pressure effect from pseudotumor cerebri causing partially empty sella. Considering to start acetazolamide but never started  - prolactin high (30), defer checking cortisol axis given already received steroids but should be investigated, ACTH (<1.5) low but should be suppressed iso steroids  - concern for pituitary adenoma, however TSH .79 normal,  fT4 0.9 normal, and low T3 67  - sedated with Dilaudid, low dose propofol, and precedex gtt. Goal to wean dilaudid slightly today as tolerated   - with lowered sedation pt awake, EASLEY, able to follow commands, however became restless pulling at lines and ETT  - continue seroquel 25mg BID to assist with sedation wean, previously held and dose lowered iso high QTc, adjust dose as tolerated   - paralytics d/c'd 2/27   - PT/OT following     Cardiovascular  #HTN   #Atrial fibrillation   - Hx of a. fib not on AC   - echo from 2024 did not demonstrate elevation of pulmonary pressures, but now with severe PH suggesting chronicity on TTE at    - TTE on 2/25 showed LV EF 61%, enlarged RV with TAPSE 2.1cm, ePASP at least 49 (Patient had 10/2024 TTE with normal LV and RV with ePASP 14).  - ROBERT 2/26 showing VTI 17  - ROBERT 3/1 showing mild TR, enlarged RV with normal LV/RV function. PEEP increased to 24 without signs of RV dilation  - troponins initially elevated, peaked at 162 however downtrended   - s/p esmolol drip for high flows now D/C'd may need to consider again if need for higher flows  - cardene gtt started for hypertension, held since 3/3  - ECG daily to monitor QTc currently 466    Pulmonary  #Pulmonary HTN  #ARDS  #Multifocal Pneumonia   #BEA   - history of diagnosed EBA/?OHS intermittent compliance to home PAP  - c/f acute on suspected chronic pulmonary hypertension in setting of possible BEA/OHS c/b pneumonia, ARDS, and pulmonary edema   - CTA chest on 2/24 negative for pulmonary embolism, notable for patchy bilateral lower lobe opacities, hepatomegaly, mild ascites, edema/induration of the abdominal pannus.  - Full RVP and BAL cultures negative    - Bronchoscopy demonstrated copious purulent secretions 2/26 and 2/27  - Bronchoscopy 3/1 and 3/5,  noted with small amount of old, dried blood at R and L mainstem, not occluding airway. follow up BAL  - s/p Dexamethasone 20mg x 5 days, now on 10 mg x5 days and taper as per clinical status pneumonia/ARDS  - C/w duoNeb q6 hours + 3% saline and q12 IPV   - c/w PC/AC PIP:34  RR 14 PC: 12 PEEP:22 Fio2 40% ~500ml  - difficult glottic visualization due to large tongue upon intubation at [facility] 2/25  - ETT migrated x2 most likely post IPV, needed to be pushed in via bronch, will watch closely (should be 28 @lip)    #ECMO  VV ECMO		  Cannulation sites/dates:  Flow: 4.49	       P1: 175		Delta P: 33  RPM: 3090	       P2: 142		SVO2: 83  Sweep: 0.5 L/min:		            FIO2: 0.21      - was requiring high flows for oxygenation causing high delta P but D/V adequate, no lactate and SVO2 WNL with some hemolysis but now resolving   - Clinically perfusing. Lactate: 2.3  - ECMO sweep sighing q1hr   - Adjust circuit flow as tolerated with DO2:VO2 goal >2  - Monitor for hemolysis, chatter, evidence of recirculation, mixing      Gastrointestinal  #Diet   #Transaminitis  - tolerating tube feeds  - elevated Tbili likely iso hemolysis 2/2 high ECMO flows, mild transaminitis now improving  - hepatomegaly and mild ascites noted on CT A/P - no pocket to tap  - RUQ ultrasound with steatosis    - c/w bowel regimen with Senna and Miralax BID  - No bm noted since admission. S/p Relistor now x 5 doses, also gave dose of magnesium citrate, reglan, fleets enema, and lactulose 10mg q3 until bm  - triglycerides 155  - nutrition following       Gu/Renal  #ELLA  #Hypernatremia   #Non-AG Metabolic Alkalosis  - ELLA - improved, likely ATN/vascular congestion  - good UO, overall net negative  - c/w free h20 250ml q6 hr for hypernatremia  - s/p intermittent diuresis, started lasix gtt 3/2 to assist with aggressive diuresis, monitor urine and electrolytes closely   - contraction alkalosis likely 2/2 diuretics, given albumin 250ml x1, acetazolamide 500mg, lasix gtt discontinued 3/4   - Lasix 40mg IVP BID frequency increased to TID    Infectious disease  #leukocytosis  #Pneumonia   #cellulitis   - Leukocytosis now likely iso steroids, remains afebrile  - s/p cefepime, linezolid, and aztihro 2/25-2/26   - s/p vanco (2/26-3/1) and c/w zosyn for a total of ~10 days (2/26-3/8) to cover cellulitis   - full RVP negative  - BCx x 2 2/24 and 2/26 currently NGTD, UCx 2/25 NGTD, tracheal aspirate Cx from 2/25 with normal commensal kyle   - strep pneumo Ag and urine legionella negative   - s/p Mupiricon b/l nares for +MSSA  (2/26-3/3)  - BAL cultures 3/1 currently NGTD, previous BAL 2/26 NGTD  - follow up BAL sent 3/5  - ? penile meatus yellow discharge, GC/chamydia and HIV negative   - candidal intertrigo +/- cellulitis of the pannus - clotrimazole cream 1% BID 2/26 -+ abx as per above    Hematology/DVT ppx  #Anticoagulation   #Thrombocytopenia  #Anemia   - Hb stable 11.2, thrombocytopenia likely r/t shearing 2/2 ECMO circuit, now improving  - 4T score low probability of HIT, received heparin at OSH, HIT/SHAWANDA sent, f/u  - c/w argatroban with goal 50-70  - LE duplex 2/25 neg  - s/p 1 u PRBC 2/26 for O2 delivery    Endocrine  #DM  -A1c 6.7   - obesity with metabolic syndrome  - diabetes with steroid induced hypoglycemia  - c/w decadron and taper iso ARDS  - pituitary workup, steroids as above given recent weight gain but likely volume though  - c/w FS Q6h and moderate ISS, may need to adjust as tapering steroids    SKin/Lines  #Access  #Cellulitis   #DTI  - L subclavian TLC inserted 2/25   - L radial artery line 2/25  - RIJ ECMO cannula 2/26 at 20 cm  - R fem ECMO cannula 2/26 at 25 cm  - candidal intertrigo +/- cellulitis of the pannus - clotrimazole cream 1% BID 2/26 -+ abx as per above  - as per nursing staff, patient with DTI to right buttocks and B/L feet   - wound consult placed     #Blisters  Blisters noted soles of b/l feet   - VA STEPHEN/PVR evaluate for PAD  - podiatry consulted for worsening of b/l foot blisters- recs vascular consult  - Vascular consult placed- f/u recs  - VA duplex b/l LE ordered          GOC   - full code   - palliative involved  and following while on ECMO   - Mother involved in care and life partner      40 yo M w/ class III obesity, pAfib (no AC), HTN, BEA (on CPAP), history of b/l papilledema attributed to pseudotumor cerebri, who is transferred from [place] on 2/26 for VV ECMO 2/2 ARDS. Patient initially presented to [place] on 2/24 for SOB and b/l LE edema. As per chart review, patient endorses some degree of SOB and b/l LE edema x 3 months with significant weight gain. As per ICU team, patient had positive sick contacts with viral URI 1 to 2 weeks PTA and since then has had worsening SOB. Patient found to be reportedly hypoxemic to 70% on RA with worsening respiratory status/secretions and somnolence in the ED. Patient was a difficult intubation (~6 attempts). Despite optimal vent management, patient remained hypoxemic. Unable to prone due to obesity. Patient cannulated for VV ECMO on 2/25 and transferred to [facility] for further management.     Neuro  #Mental status   #pseudotumor cerebri  - history of pseudotumor cerebri s/p LP under fluoro in 2020 with high opening pressure with MRI 2020 also showed possible pituitary mass but unclear because of pressure effect from pseudotumor cerebri causing partially empty sella. Considering to start acetazolamide but never started  - prolactin high (30), defer checking cortisol axis given already received steroids but should be investigated, ACTH (<1.5) low but should be suppressed iso steroids  - concern for pituitary adenoma, however TSH .79 normal,  fT4 0.9 normal, and low T3 67  - sedated with Dilaudid, low dose propofol, and precedex gtt. Goal to wean dilaudid slightly today as tolerated   - with lowered sedation pt awake, EASLEY, able to follow commands, however became restless pulling at lines and ETT  - continue seroquel 25mg BID to assist with sedation wean, previously held and dose lowered iso high QTc, adjust dose as tolerated   - paralytics d/c'd 2/27   - PT/OT following     Cardiovascular  #HTN   #Atrial fibrillation   - Hx of a. fib not on AC   - echo from 2024 did not demonstrate elevation of pulmonary pressures, but now with severe PH suggesting chronicity on TTE at    - TTE on 2/25 showed LV EF 61%, enlarged RV with TAPSE 2.1cm, ePASP at least 49 (Patient had 10/2024 TTE with normal LV and RV with ePASP 14).  - ROBERT 2/26 showing VTI 17  - ROBERT 3/1 showing mild TR, enlarged RV with normal LV/RV function. PEEP increased to 24 without signs of RV dilation  - troponins initially elevated, peaked at 162 however downtrended   - s/p esmolol drip for high flows now D/C'd may need to consider again if need for higher flows  - cardene gtt started for hypertension, held since 3/3  - ECG daily to monitor QTc currently 466    Pulmonary  #Pulmonary HTN  #ARDS  #Multifocal Pneumonia   #BEA   - history of diagnosed BEA/?OHS intermittent compliance to home PAP  - c/f acute on suspected chronic pulmonary hypertension in setting of possible BEA/OHS c/b pneumonia, ARDS, and pulmonary edema   - CTA chest on 2/24 negative for pulmonary embolism, notable for patchy bilateral lower lobe opacities, hepatomegaly, mild ascites, edema/induration of the abdominal pannus.  - Full RVP and BAL cultures negative    - Bronchoscopy demonstrated copious purulent secretions 2/26 and 2/27  - Bronchoscopy 3/1 and 3/5,  noted with small amount of old, dried blood at R and L mainstem, not occluding airway. follow up BAL  - s/p Dexamethasone 20mg x 5 days, now on 10 mg x5 days and taper as per clinical status pneumonia/ARDS  - C/w duoNeb q6 hours + 3% saline and q12 IPV   - c/w PC/AC PIP:34  RR 14 PC: 12 PEEP:22 Fio2 40% ~500ml  - difficult glottic visualization due to large tongue upon intubation at [facility] 2/25  - ETT migrated x2 most likely post IPV, needed to be pushed in via bronch, will watch closely (should be 28 @lip)  - plan for tracheostomy and peg placement tentatively for Friday 3/7 by CT surgery in OR    #ECMO  VV ECMO		  Cannulation sites/dates:  Flow: 4.49	       P1: 175		Delta P: 33  RPM: 3090	       P2: 142		SVO2: 83  Sweep: 0.5 L/min:		            FIO2: 0.21      - was requiring high flows for oxygenation causing high delta P but D/V adequate, no lactate and SVO2 WNL with some hemolysis but now resolving   - Clinically perfusing. Lactate: 2.3  - ECMO sweep sighing q1hr   - Adjust circuit flow as tolerated with DO2:VO2 goal >2  - Monitor for hemolysis, chatter, evidence of recirculation, mixing      Gastrointestinal  #Diet   #Transaminitis  - tolerating tube feeds  - plan NPO after midnight 3/6 for trach/peg placement in OR 3/7  - elevated Tbili likely iso hemolysis 2/2 high ECMO flows, mild transaminitis now improving  - hepatomegaly and mild ascites noted on CT A/P - no pocket to tap  - RUQ ultrasound with steatosis    - c/w bowel regimen with Senna and Miralax BID  - No bm noted since admission. S/p Relistor now x 5 doses, also gave dose of magnesium citrate, reglan, fleets enema, and lactulose 10mg q3 until bm  - triglycerides 155  - nutrition following       Gu/Renal  #ELLA  #Hypernatremia   #Non-AG Metabolic Alkalosis  - ELLA - improved, likely ATN/vascular congestion  - good UO, overall net negative  - c/w free h20 250ml q6 hr for hypernatremia  - s/p intermittent diuresis, started lasix gtt 3/2 to assist with aggressive diuresis, monitor urine and electrolytes closely   - contraction alkalosis likely 2/2 diuretics, given albumin 250ml x1, acetazolamide 500mg, lasix gtt discontinued 3/4   - Lasix 40mg IVP BID frequency increased to TID    Infectious disease  #leukocytosis  #Pneumonia   #cellulitis   - Leukocytosis now likely iso steroids, remains afebrile  - s/p cefepime, linezolid, and aztihro 2/25-2/26   - s/p vanco (2/26-3/1) and c/w zosyn for a total of ~10 days (2/26-3/8) to cover cellulitis   - full RVP negative  - BCx x 2 2/24 and 2/26 currently NGTD, UCx 2/25 NGTD, tracheal aspirate Cx from 2/25 with normal commensal kyle   - strep pneumo Ag and urine legionella negative   - s/p Mupiricon b/l nares for +MSSA  (2/26-3/3)  - BAL cultures 3/1 currently NGTD, previous BAL 2/26 NGTD  - follow up BAL sent 3/5  - ? penile meatus yellow discharge, GC/chamydia and HIV negative   - candidal intertrigo +/- cellulitis of the pannus - clotrimazole cream 1% BID 2/26 -+ abx as per above    Hematology/DVT ppx  #Anticoagulation   #Thrombocytopenia  #Anemia   - Hb stable 11.2, thrombocytopenia likely r/t shearing 2/2 ECMO circuit, now improving  - 4T score low probability of HIT, received heparin at OSH, HIT/SHAWANDA sent, f/u  - c/w argatroban with goal 50-70  - LE duplex 2/25 neg  - s/p 1 u PRBC 2/26 for O2 delivery    Endocrine  #DM  -A1c 6.7   - obesity with metabolic syndrome  - diabetes with steroid induced hypoglycemia  - c/w decadron and taper iso ARDS  - pituitary workup, steroids as above given recent weight gain but likely volume though  - c/w FS Q6h and moderate ISS, may need to adjust as tapering steroids    SKin/Lines  #Access  #Cellulitis   #DTI  - L subclavian TLC inserted 2/25   - L radial artery line 2/25  - RIJ ECMO cannula 2/26 at 20 cm  - R fem ECMO cannula 2/26 at 25 cm  - candidal intertrigo +/- cellulitis of the pannus - clotrimazole cream 1% BID 2/26 -+ abx as per above  - as per nursing staff, patient with DTI to right buttocks and B/L feet   - wound consult placed     #Blisters  Blisters noted soles of b/l feet   - VA STEPHEN/PVR evaluate for PAD  - podiatry consulted for worsening of b/l foot blisters- recs vascular consult  - Vascular consult placed- f/u recs  - VA duplex b/l LE ordered          GOC   - full code   - palliative involved  and following while on ECMO   - Mother involved in care and life partner      40 yo M w/ class III obesity, pAfib (no AC), HTN, BEA (on CPAP), history of b/l papilledema attributed to pseudotumor cerebri, who is transferred from [place] on 2/26 for VV ECMO 2/2 ARDS. Patient initially presented to [place] on 2/24 for SOB and b/l LE edema. As per chart review, patient endorses some degree of SOB and b/l LE edema x 3 months with significant weight gain. As per ICU team, patient had positive sick contacts with viral URI 1 to 2 weeks PTA and since then has had worsening SOB. Patient found to be reportedly hypoxemic to 70% on RA with worsening respiratory status/secretions and somnolence in the ED. Patient was a difficult intubation (~6 attempts). Despite optimal vent management, patient remained hypoxemic. Unable to prone due to obesity. Patient cannulated for VV ECMO on 2/25 and transferred to [facility] for further management.     Neuro  #Mental status   #pseudotumor cerebri  - history of pseudotumor cerebri s/p LP under fluoro in 2020 with high opening pressure with MRI 2020 also showed possible pituitary mass but unclear because of pressure effect from pseudotumor cerebri causing partially empty sella. Considering to start acetazolamide but never started  - prolactin high (30), defer checking cortisol axis given already received steroids but should be investigated, ACTH (<1.5) low but should be suppressed iso steroids  - concern for pituitary adenoma, however TSH .79 normal,  fT4 0.9 normal, and low T3 67  - sedated with Dilaudid, low dose propofol, and precedex gtt. Goal to wean dilaudid slightly today as tolerated   - with lowered sedation pt awake, EASLEY, able to follow commands, however became restless pulling at lines and ETT  - continue seroquel 25mg BID to assist with sedation wean, previously held and dose lowered iso high QTc, adjust dose as tolerated   - paralytics d/c'd 2/27   - PT/OT following     Cardiovascular  #HTN   #Atrial fibrillation   - Hx of a. fib not on AC   - echo from 2024 did not demonstrate elevation of pulmonary pressures, but now with severe PH suggesting chronicity on TTE at    - TTE on 2/25 showed LV EF 61%, enlarged RV with TAPSE 2.1cm, ePASP at least 49 (Patient had 10/2024 TTE with normal LV and RV with ePASP 14).  - ROBERT 2/26 showing VTI 17  - ROBERT 3/1 showing mild TR, enlarged RV with normal LV/RV function. PEEP increased to 24 without signs of RV dilation  - troponins initially elevated, peaked at 162 however downtrended   - s/p esmolol drip for high flows now D/C'd may need to consider again if need for higher flows  - cardene gtt started for hypertension, held since 3/3  - ECG daily to monitor QTc currently 466    Pulmonary  #Pulmonary HTN  #ARDS  #Multifocal Pneumonia   #BEA   - history of diagnosed BEA/?OHS intermittent compliance to home PAP  - c/f acute on suspected chronic pulmonary hypertension in setting of possible BEA/OHS c/b pneumonia, ARDS, and pulmonary edema   - CTA chest on 2/24 negative for pulmonary embolism, notable for patchy bilateral lower lobe opacities, hepatomegaly, mild ascites, edema/induration of the abdominal pannus.  - Full RVP and BAL cultures negative    - Bronchoscopy demonstrated copious purulent secretions 2/26 and 2/27  - Bronchoscopy 3/1 and 3/5,  noted with small amount of old, dried blood at R and L mainstem, not occluding airway. follow up BAL  - s/p Dexamethasone 20mg x 5 days, now on 10 mg x5 days and taper as per clinical status pneumonia/ARDS  - C/w duoNeb q6 hours + 3% saline and q12 IPV   - c/w PC/AC PIP:34  RR 14 PC: 12 PEEP:22 Fio2 40% ~500ml  - difficult glottic visualization due to large tongue upon intubation at [facility] 2/25  - ETT migrated x2 most likely post IPV, needed to be pushed in via bronch, will watch closely (should be 28 @lip)  - plan for tracheostomy and peg placement tentatively for Friday 3/7 by CT surgery in OR    #ECMO  VV ECMO		  Cannulation sites/dates:  Flow: 4.49	       P1: 175		Delta P: 33  RPM: 3090	       P2: 142		SVO2: 83  Sweep: 0.5 L/min:		            FIO2: 0.21      - was requiring high flows for oxygenation causing high delta P but D/V adequate, no lactate and SVO2 WNL with some hemolysis but now resolving   - Clinically perfusing. Lactate: 2.3  - ECMO sweep sighing q1hr   - Adjust circuit flow as tolerated with DO2:VO2 goal >2  - Monitor for hemolysis, chatter, evidence of recirculation, mixing      Gastrointestinal  #Diet   #Transaminitis  - tolerating tube feeds  - plan NPO after midnight 3/6 for trach/peg placement in OR 3/7  - elevated Tbili likely iso hemolysis 2/2 high ECMO flows, mild transaminitis now improving  - hepatomegaly and mild ascites noted on CT A/P - no pocket to tap  - RUQ ultrasound with steatosis    - c/w bowel regimen with Senna and Miralax BID  - No bm noted since admission. S/p Relistor now x 5 doses, also gave dose of magnesium citrate, reglan, fleets enema, and lactulose 10mg q3 until bm  - triglycerides 155  - nutrition following       Gu/Renal  #ELLA  #Hypernatremia   #Non-AG Metabolic Alkalosis  - ELLA - improved, likely ATN/vascular congestion  - good UO, overall net negative  - c/w free h20 250ml q6 hr for hypernatremia  - s/p intermittent diuresis, started lasix gtt 3/2 to assist with aggressive diuresis, monitor urine and electrolytes closely   - contraction alkalosis likely 2/2 diuretics, given albumin 250ml x1, acetazolamide 500mg, lasix gtt discontinued 3/4   - Lasix 40mg IVP BID frequency increased to TID    Infectious disease  #leukocytosis  #Pneumonia   #cellulitis   - Leukocytosis now likely iso steroids, remains afebrile  - s/p cefepime, linezolid, and aztihro 2/25-2/26   - s/p vanco (2/26-3/1) and c/w zosyn for a total of ~10 days (2/26-3/8) to cover cellulitis   - full RVP negative  - BCx x 2 2/24 and 2/26 currently NGTD, UCx 2/25 NGTD, tracheal aspirate Cx from 2/25 with normal commensal kyle   - strep pneumo Ag and urine legionella negative   - s/p Mupiricon b/l nares for +MSSA  (2/26-3/3)  - BAL cultures 3/1 currently NGTD, previous BAL 2/26 NGTD  - follow up BAL sent 3/5  - ? penile meatus yellow discharge, GC/chamydia and HIV negative   - candidal intertrigo +/- cellulitis of the pannus - clotrimazole cream 1% BID 2/26 -+ abx as per above    Hematology/DVT ppx  #Anticoagulation   #Thrombocytopenia  #Anemia   - Hb stable 11.2, thrombocytopenia likely r/t shearing 2/2 ECMO circuit, now improving  - 4T score low probability of HIT, received heparin at OSH, HIT/SHAWANDA sent, f/u  - c/w argatroban with goal 50-70  - LE duplex 2/25 neg  - s/p 1 u PRBC 2/26 for O2 delivery    Endocrine  #DM  -A1c 6.7   - obesity with metabolic syndrome  - diabetes with steroid induced hypoglycemia  - c/w decadron and taper iso ARDS  - pituitary workup, steroids as above given recent weight gain but likely volume though  - c/w FS Q6h and moderate ISS, may need to adjust as tapering steroids    SKin/Lines  #Access  #Cellulitis   #DTI  - L subclavian TLC inserted 2/25   - L radial artery line 2/25  - RIJ ECMO cannula 2/26 at 20 cm  - R fem ECMO cannula 2/26 at 25 cm  - candidal intertrigo +/- cellulitis of the pannus - clotrimazole cream 1% BID 2/26 -+ abx as per above  - as per nursing staff, patient with DTI to right buttocks and B/L feet   - wound consult placed     #Blisters  Blisters noted soles of b/l feet   - VA STEPHEN/PVR to evaluate for PAD  - podiatry consulted for worsening of b/l foot blisters- recs vascular consult  - Vascular consult placed- f/u recs  - VA duplex b/l LE ordered          GOC   - full code   - palliative involved  and following while on ECMO   - Mother involved in care and life partner      38 yo M w/ class III obesity, pAfib (no AC), HTN, BEA (on CPAP), history of b/l papilledema attributed to pseudotumor cerebri, who is transferred from [place] on 2/26 for VV ECMO 2/2 ARDS. Patient initially presented to [place] on 2/24 for SOB and b/l LE edema. As per chart review, patient endorses some degree of SOB and b/l LE edema x 3 months with significant weight gain. As per ICU team, patient had positive sick contacts with viral URI 1 to 2 weeks PTA and since then has had worsening SOB. Patient found to be reportedly hypoxemic to 70% on RA with worsening respiratory status/secretions and somnolence in the ED. Patient was a difficult intubation (~6 attempts). Despite optimal vent management, patient remained hypoxemic. Unable to prone due to obesity. Patient cannulated for VV ECMO on 2/25 and transferred to [facility] for further management.     Neuro  #Mental status   #pseudotumor cerebri  - history of pseudotumor cerebri s/p LP under fluoro in 2020 with high opening pressure with MRI 2020 also showed possible pituitary mass but unclear because of pressure effect from pseudotumor cerebri causing partially empty sella. Considering to start acetazolamide but never started  - prolactin high (30), defer checking cortisol axis given already received steroids but should be investigated, ACTH (<1.5) low but should be suppressed iso steroids  - concern for pituitary adenoma, however TSH .79 normal,  fT4 0.9 normal, and low T3 67  - sedated with Dilaudid, low dose propofol, and precedex gtt. Goal to wean dilaudid slightly today as tolerated   - with lowered sedation pt awake, EASLEY, able to follow commands, however became restless pulling at lines and ETT  - continue seroquel 25mg BID to assist with sedation wean, previously held and dose lowered iso high QTc, adjust dose as tolerated   - paralytics d/c'd 2/27   - PT/OT following     Cardiovascular  #HTN   #Atrial fibrillation   - Hx of a. fib not on AC   - echo from 2024 did not demonstrate elevation of pulmonary pressures, but now with severe PH suggesting chronicity on TTE at    - TTE on 2/25 showed LV EF 61%, enlarged RV with TAPSE 2.1cm, ePASP at least 49 (Patient had 10/2024 TTE with normal LV and RV with ePASP 14).  - ROBERT 2/26 showing VTI 17  - ROBERT 3/1 showing mild TR, enlarged RV with normal LV/RV function. PEEP increased to 24 without signs of RV dilation  - troponins initially elevated, peaked at 162 however downtrended   - s/p esmolol drip for high flows now D/C'd may need to consider again if need for higher flows  - cardene gtt started for hypertension, held since 3/3  - ECG daily to monitor QTc currently 466    Pulmonary  #Pulmonary HTN  #ARDS  #Multifocal Pneumonia   #BEA   - history of diagnosed BEA/?OHS intermittent compliance to home PAP  - c/f acute on suspected chronic pulmonary hypertension in setting of possible BEA/OHS c/b pneumonia, ARDS, and pulmonary edema   - CTA chest on 2/24 negative for pulmonary embolism, notable for patchy bilateral lower lobe opacities, hepatomegaly, mild ascites, edema/induration of the abdominal pannus.  - Full RVP and BAL cultures negative    - Bronchoscopy demonstrated copious purulent secretions 2/26 and 2/27  - Bronchoscopy 3/1 and 3/5,  noted with small amount of old, dried blood at R and L mainstem, not occluding airway. follow up BAL  - s/p Dexamethasone 20mg x 5 days, now on 10 mg x5 days and taper as per clinical status pneumonia/ARDS  - C/w duoNeb q6 hours + 3% saline and q12 IPV   - c/w PC/AC PIP:34  RR 14 PC: 12 PEEP:22 Fio2 40% ~500ml  - difficult glottic visualization due to large tongue upon intubation at [facility] 2/25  - ETT migrated x2 most likely post IPV, needed to be pushed in via bronch, will watch closely (should be 28 @lip)  - plan for tracheostomy and peg placement tentatively for Friday 3/7 by CT surgery in OR    #ECMO  VV ECMO		  Cannulation sites/dates:  Flow: 4.49	       P1: 175		Delta P: 33  RPM: 3090	       P2: 142		SVO2: 83  Sweep: 0.5 L/min:		            FIO2: 0.21      - was requiring high flows for oxygenation causing high delta P but D/V adequate, no lactate and SVO2 WNL with some hemolysis but now resolving   - Clinically perfusing. Lactate: 2.3  - ECMO sweep sighing q1hr   - Adjust circuit flow as tolerated with DO2:VO2 goal >2  - Monitor for hemolysis, chatter, evidence of recirculation, mixing      Gastrointestinal  #Diet   #Transaminitis  - tolerating tube feeds  - plan NPO after midnight 3/6 for trach/peg placement in OR 3/7  - elevated Tbili likely iso hemolysis 2/2 high ECMO flows, mild transaminitis now improving  - hepatomegaly and mild ascites noted on CT A/P - no pocket to tap  - RUQ ultrasound with steatosis    - c/w bowel regimen with Senna, Miralax and movantik  - S/p Relistor now x 5 doses, magnesium citrate, reglan, fleets enema, and lactulose 10mg q3, had large bm 3/5  - triglycerides 155  - nutrition following       Gu/Renal  #ELLA  #Hypernatremia   #Non-AG Metabolic Alkalosis  - ELLA - improved, likely ATN/vascular congestion  - good UO, overall net negative  - c/w free h20 250ml q6 hr for hypernatremia  - s/p intermittent diuresis, started lasix gtt 3/2 to assist with aggressive diuresis, monitor urine and electrolytes closely   - contraction alkalosis likely 2/2 diuretics, given albumin 250ml x1, acetazolamide 500mg, lasix gtt discontinued 3/4   - Lasix 40mg IVP BID frequency increased to TID    Infectious disease  #leukocytosis  #Pneumonia   #cellulitis   - Leukocytosis now likely iso steroids, remains afebrile  - s/p cefepime, linezolid, and aztihro 2/25-2/26   - s/p vanco (2/26-3/1) and c/w zosyn for a total of ~10 days (2/26-3/8) to cover cellulitis   - full RVP negative  - BCx x 2 2/24 and 2/26 currently NGTD, UCx 2/25 NGTD, tracheal aspirate Cx from 2/25 with normal commensal kyle   - strep pneumo Ag and urine legionella negative   - s/p Mupiricon b/l nares for +MSSA  (2/26-3/3)  - BAL cultures 3/1 currently NGTD, previous BAL 2/26 NGTD  - follow up BAL sent 3/5  - ? penile meatus yellow discharge, GC/chamydia and HIV negative   - candidal intertrigo +/- cellulitis of the pannus - clotrimazole cream 1% BID 2/26 -+ abx as per above    Hematology/DVT ppx  #Anticoagulation   #Thrombocytopenia  #Anemia   - Hb stable 11.2, thrombocytopenia likely r/t shearing 2/2 ECMO circuit, now improving  - 4T score low probability of HIT, received heparin at OSH, HIT/SHAWANDA sent, f/u  - c/w argatroban with goal 50-70  - LE duplex 2/25 neg  - s/p 1 u PRBC 2/26 for O2 delivery    Endocrine  #DM  -A1c 6.7   - obesity with metabolic syndrome  - diabetes with steroid induced hypoglycemia  - c/w decadron and taper iso ARDS  - pituitary workup, steroids as above given recent weight gain but likely volume though  - c/w FS Q6h and moderate ISS, may need to adjust as tapering steroids    SKin/Lines  #Access  #Cellulitis   #DTI  - L subclavian TLC inserted 2/25   - L radial artery line 2/25  - RIJ ECMO cannula 2/26 at 20 cm  - R fem ECMO cannula 2/26 at 25 cm  - candidal intertrigo +/- cellulitis of the pannus - clotrimazole cream 1% BID 2/26 -+ abx as per above  - as per nursing staff, patient with DTI to right buttocks and B/L feet   - wound consult placed     #Blisters  Blisters noted soles of b/l feet   - VA STEPHEN/PVR to evaluate for PAD  - podiatry consulted for worsening of b/l foot blisters- recs vascular consult  - Vascular consult placed- f/u recs  - VA duplex b/l LE ordered          GOC   - full code   - palliative involved  and following while on ECMO   - Mother involved in care and life partner      38 yo M w/ class III obesity, pAfib (no AC), HTN, BEA (on CPAP), history of b/l papilledema attributed to pseudotumor cerebri, who is transferred from [place] on 2/26 for VV ECMO 2/2 ARDS. Patient initially presented to [place] on 2/24 for SOB and b/l LE edema. As per chart review, patient endorses some degree of SOB and b/l LE edema x 3 months with significant weight gain. As per ICU team, patient had positive sick contacts with viral URI 1 to 2 weeks PTA and since then has had worsening SOB. Patient found to be reportedly hypoxemic to 70% on RA with worsening respiratory status/secretions and somnolence in the ED. Patient was a difficult intubation (~6 attempts). Despite optimal vent management, patient remained hypoxemic. Unable to prone due to obesity. Patient cannulated for VV ECMO on 2/25 and transferred to [facility] for further management.     Neuro  #Mental status   #pseudotumor cerebri  - history of pseudotumor cerebri s/p LP under fluoro in 2020 with high opening pressure with MRI 2020 also showed possible pituitary mass but unclear because of pressure effect from pseudotumor cerebri causing partially empty sella. Considering to start acetazolamide but never started  - prolactin high (30), defer checking cortisol axis given already received steroids but should be investigated, ACTH (<1.5) low but should be suppressed iso steroids  - concern for pituitary adenoma, however TSH .79 normal,  fT4 0.9 normal, and low T3 67  - sedated with Dilaudid, low dose propofol, and precedex gtt. Goal to wean dilaudid slightly today as tolerated   - with lowered sedation pt awake, EASLEY, able to follow commands, however became restless pulling at lines and ETT  - continue seroquel 25mg BID to assist with sedation wean, previously held and dose lowered iso high QTc, adjust dose as tolerated   - paralytics d/c'd 2/27   - PT/OT following     Cardiovascular  #HTN   #Atrial fibrillation   - Hx of a. fib not on AC   - echo from 2024 did not demonstrate elevation of pulmonary pressures, but now with severe PH suggesting chronicity on TTE at    - TTE on 2/25 showed LV EF 61%, enlarged RV with TAPSE 2.1cm, ePASP at least 49 (Patient had 10/2024 TTE with normal LV and RV with ePASP 14).  - ROBERT 2/26 showing VTI 17  - ROBERT 3/1 showing mild TR, enlarged RV with normal LV/RV function. PEEP increased to 24 without signs of RV dilation  - troponins initially elevated, peaked at 162 however downtrended   - s/p esmolol drip for high flows now D/C'd may need to consider again if need for higher flows  - cardene gtt started for hypertension, held since 3/3  - ECG daily to monitor QTc currently 466    Pulmonary  #Pulmonary HTN  #ARDS  #Multifocal Pneumonia   #BEA   - history of diagnosed BEA/?OHS intermittent compliance to home PAP  - c/f acute on suspected chronic pulmonary hypertension in setting of possible BEA/OHS c/b pneumonia, ARDS, and pulmonary edema   - CTA chest on 2/24 negative for pulmonary embolism, notable for patchy bilateral lower lobe opacities, hepatomegaly, mild ascites, edema/induration of the abdominal pannus.  - Full RVP and BAL cultures negative    - Bronchoscopy demonstrated copious purulent secretions 2/26 and 2/27  - Bronchoscopy 3/1 and 3/5,  noted with small amount of old, dried blood at R and L mainstem, not occluding airway. follow up BAL  - s/p Dexamethasone 20mg x 5 days, now on 10 mg x5 days and taper as per clinical status pneumonia/ARDS  - C/w duoNeb q6 hours + 3% saline and q12 IPV   - c/w PC/AC PIP:32  RR 14 PC: 12 PEEP:20 Fio2 40% ~500-900ml  - difficult glottic visualization due to large tongue upon intubation at [facility] 2/25  - ETT migrated x2 most likely post IPV, needed to be pushed in via bronch, will watch closely (should be 28 @lip)  - plan for tracheostomy and peg placement tentatively for Friday 3/7 by CT surgery in OR    #ECMO  VV ECMO		  Cannulation sites/dates:  Flow: 4.49	       P1: 175		Delta P: 33  RPM: 3090	       P2: 142		SVO2: 83  Sweep: 0.5 L/min:		            FIO2: 0.21      - was requiring high flows for oxygenation causing high delta P but D/V adequate, no lactate and SVO2 WNL with some hemolysis but now resolving   - Clinically perfusing. Lactate: 2.3  - ECMO sweep sighing q1hr   - Adjust circuit flow as tolerated with DO2:VO2 goal >2  - Monitor for hemolysis, chatter, evidence of recirculation, mixing      Gastrointestinal  #Diet   #Transaminitis  - tolerating tube feeds  - plan NPO after midnight 3/6 for trach/peg placement in OR 3/7  - elevated Tbili likely iso hemolysis 2/2 high ECMO flows, mild transaminitis now improving  - hepatomegaly and mild ascites noted on CT A/P - no pocket to tap  - RUQ ultrasound with steatosis    - c/w bowel regimen with Senna, Miralax and movantik  - S/p Relistor now x 5 doses, magnesium citrate, reglan, fleets enema, and lactulose 10mg q3, had large bm 3/5  - triglycerides 155  - nutrition following       Gu/Renal  #ELLA  #Hypernatremia   #Non-AG Metabolic Alkalosis  - ELLA - improved, likely ATN/vascular congestion  - good UO, overall net negative  - c/w free h20 250ml q6 hr for hypernatremia  - s/p intermittent diuresis, started lasix gtt 3/2 to assist with aggressive diuresis, monitor urine and electrolytes closely   - contraction alkalosis likely 2/2 diuretics, given albumin 250ml x1, acetazolamide 500mg, lasix gtt discontinued 3/4   - Lasix 40mg IVP BID frequency increased to TID    Infectious disease  #leukocytosis  #Pneumonia   #cellulitis   - Leukocytosis now likely iso steroids, remains afebrile  - s/p cefepime, linezolid, and aztihro 2/25-2/26   - s/p vanco (2/26-3/1) and c/w zosyn for a total of ~10 days (2/26-3/8) to cover cellulitis   - full RVP negative  - BCx x 2 2/24 and 2/26 currently NGTD, UCx 2/25 NGTD, tracheal aspirate Cx from 2/25 with normal commensal kyle   - strep pneumo Ag and urine legionella negative   - s/p Mupiricon b/l nares for +MSSA  (2/26-3/3)  - BAL cultures 3/1 currently NGTD, previous BAL 2/26 NGTD  - follow up BAL sent 3/5  - ? penile meatus yellow discharge, GC/chamydia and HIV negative   - candidal intertrigo +/- cellulitis of the pannus - clotrimazole cream 1% BID 2/26 -+ abx as per above    Hematology/DVT ppx  #Anticoagulation   #Thrombocytopenia  #Anemia   - Hb stable 11.2, thrombocytopenia likely r/t shearing 2/2 ECMO circuit, now improving  - 4T score low probability of HIT, received heparin at OSH, HIT/SHAWANDA sent, f/u  - c/w argatroban with goal 50-70  - LE duplex 2/25 neg  - s/p 1 u PRBC 2/26 for O2 delivery    Endocrine  #DM  -A1c 6.7   - obesity with metabolic syndrome  - diabetes with steroid induced hypoglycemia  - c/w decadron and taper iso ARDS  - pituitary workup, steroids as above given recent weight gain but likely volume though  - c/w FS Q6h and moderate ISS, may need to adjust as tapering steroids    SKin/Lines  #Access  #Cellulitis   #DTI  - L subclavian TLC inserted 2/25   - L radial artery line 2/25  - RIJ ECMO cannula 2/26 at 20 cm  - R fem ECMO cannula 2/26 at 25 cm  - candidal intertrigo +/- cellulitis of the pannus - clotrimazole cream 1% BID 2/26 -+ abx as per above  - as per nursing staff, patient with DTI to right buttocks and B/L feet   - wound consult placed     #Blisters  Blisters noted soles of b/l feet   - VA STEPHEN/PVR to evaluate for PAD  - podiatry consulted for worsening of b/l foot blisters- recs vascular consult  - Vascular consult placed- f/u recs  - VA duplex b/l LE ordered          GOC   - full code   - palliative involved  and following while on ECMO   - Mother involved in care and life partner      40 yo M w/ class III obesity, pAfib (no AC), HTN, BEA (on CPAP), history of b/l papilledema attributed to pseudotumor cerebri, who is transferred from [place] on 2/26 for VV ECMO 2/2 ARDS. Patient initially presented to [place] on 2/24 for SOB and b/l LE edema. As per chart review, patient endorses some degree of SOB and b/l LE edema x 3 months with significant weight gain. As per ICU team, patient had positive sick contacts with viral URI 1 to 2 weeks PTA and since then has had worsening SOB. Patient found to be reportedly hypoxemic to 70% on RA with worsening respiratory status/secretions and somnolence in the ED. Patient was a difficult intubation (~6 attempts). Despite optimal vent management, patient remained hypoxemic. Unable to prone due to obesity. Patient cannulated for VV ECMO on 2/25 and transferred to [facility] for further management.     Neuro  #Mental status   #pseudotumor cerebri  - history of pseudotumor cerebri s/p LP under fluoro in 2020 with high opening pressure with MRI 2020 also showed possible pituitary mass but unclear because of pressure effect from pseudotumor cerebri causing partially empty sella. Considering to start acetazolamide but never started  - prolactin high (30), defer checking cortisol axis given already received steroids but should be investigated, ACTH (<1.5) low but should be suppressed iso steroids  - concern for pituitary adenoma, however TSH .79 normal,  fT4 0.9 normal, and low T3 67  - sedated with Dilaudid, low dose propofol, and precedex gtt. Goal to wean dilaudid slightly today as tolerated   - with lowered sedation pt awake, EASLEY, able to follow commands, however became restless pulling at lines and ETT  - continue seroquel 25mg BID to assist with sedation wean, previously held and dose lowered iso high QTc, adjust dose as tolerated   - paralytics d/c'd 2/27   - PT/OT following     Cardiovascular  #HTN   #Atrial fibrillation   - Hx of a. fib not on AC   - echo from 2024 did not demonstrate elevation of pulmonary pressures, but now with severe PH suggesting chronicity on TTE at    - TTE on 2/25 showed LV EF 61%, enlarged RV with TAPSE 2.1cm, ePASP at least 49 (Patient had 10/2024 TTE with normal LV and RV with ePASP 14).  - ROBERT 2/26 showing VTI 17  - ROBERT 3/1 showing mild TR, enlarged RV with normal LV/RV function. PEEP increased to 24 without signs of RV dilation  - troponins initially elevated, peaked at 162 however downtrended   - s/p esmolol drip for high flows now D/C'd may need to consider again if need for higher flows  - cardene gtt started for hypertension, held since 3/3  - ECG daily to monitor QTc currently 466    Pulmonary  #Pulmonary HTN  #ARDS  #Multifocal Pneumonia   #BEA   - history of diagnosed BEA/?OHS intermittent compliance to home PAP  - c/f acute on suspected chronic pulmonary hypertension in setting of possible BEA/OHS c/b pneumonia, ARDS, and pulmonary edema   - CTA chest on 2/24 negative for pulmonary embolism, notable for patchy bilateral lower lobe opacities, hepatomegaly, mild ascites, edema/induration of the abdominal pannus.  - Full RVP and BAL cultures negative    - Bronchoscopy demonstrated copious purulent secretions 2/26 and 2/27  - Bronchoscopy 3/1 and 3/5,  noted with small amount of old, dried blood at R and L mainstem, not occluding airway. follow up BAL  - s/p Dexamethasone 20mg x 5 days, now on 10 mg x5 days and taper as per clinical status pneumonia/ARDS  - C/w duoNeb q6 hours + 3% saline and q12 IPV   - c/w PC/AC PIP:32  RR 14 PC: 12 PEEP:20 Fio2 40% ~500-900ml  - difficult glottic visualization due to large tongue upon intubation at [facility] 2/25  - ETT migrated x2 most likely post IPV, needed to be pushed in via bronch, will watch closely (should be 28 @lip)  - plan for tracheostomy and possible peg placement Friday 3/7 by CT surgery in OR    #ECMO  VV ECMO		  Cannulation sites/dates:  Flow: 4.49	       P1: 175		Delta P: 33  RPM: 3090	       P2: 142		SVO2: 83  Sweep: 0.5 L/min:		            FIO2: 0.21      - was requiring high flows for oxygenation causing high delta P but D/V adequate, no lactate and SVO2 WNL with some hemolysis but now resolving   - Clinically perfusing. Lactate: 2.3  - ECMO sweep sighing q1hr   - Adjust circuit flow as tolerated with DO2:VO2 goal >2  - Monitor for hemolysis, chatter, evidence of recirculation, mixing      Gastrointestinal  #Diet   #Transaminitis  - tolerating tube feeds  - plan NPO after midnight 3/6 for trach/peg placement in OR 3/7  - elevated Tbili likely iso hemolysis 2/2 high ECMO flows, mild transaminitis now improving  - hepatomegaly and mild ascites noted on CT A/P - no pocket to tap  - RUQ ultrasound with steatosis    - c/w bowel regimen with Senna, Miralax and movantik  - S/p Relistor now x 5 doses, magnesium citrate, reglan, fleets enema, and lactulose 10mg q3, had large bm 3/5  - triglycerides 155  - nutrition following       Gu/Renal  #ELLA  #Hypernatremia   #Non-AG Metabolic Alkalosis  - ELLA - improved, likely ATN/vascular congestion  - good UO, overall net negative  - c/w free h20 250ml q6 hr for hypernatremia  - s/p intermittent diuresis, started lasix gtt 3/2 to assist with aggressive diuresis, monitor urine and electrolytes closely   - contraction alkalosis likely 2/2 diuretics, given albumin 250ml x1, acetazolamide 500mg, lasix gtt discontinued 3/4   - Lasix 40mg IVP BID frequency increased to TID    Infectious disease  #leukocytosis  #Pneumonia   #cellulitis   - Leukocytosis now likely iso steroids, remains afebrile  - s/p cefepime, linezolid, and aztihro 2/25-2/26   - s/p vanco (2/26-3/1) and c/w zosyn for a total of ~10 days (2/26-3/8) to cover cellulitis   - full RVP negative  - BCx x 2 2/24 and 2/26 currently NGTD, UCx 2/25 NGTD, tracheal aspirate Cx from 2/25 with normal commensal kyle   - strep pneumo Ag and urine legionella negative   - s/p Mupiricon b/l nares for +MSSA  (2/26-3/3)  - BAL cultures 3/1 currently NGTD, previous BAL 2/26 NGTD  - follow up BAL sent 3/5  - ? penile meatus yellow discharge, GC/chamydia and HIV negative   - candidal intertrigo +/- cellulitis of the pannus - clotrimazole cream 1% BID 2/26 -+ abx as per above    Hematology/DVT ppx  #Anticoagulation   #Thrombocytopenia  #Anemia   - Hb stable 11.2, thrombocytopenia likely r/t shearing 2/2 ECMO circuit, now improving  - 4T score low probability of HIT, received heparin at OSH, HIT/SHAWANDA sent, f/u  - c/w argatroban with goal 50-70  - LE duplex 2/25 neg  - s/p 1 u PRBC 2/26 for O2 delivery    Endocrine  #DM  -A1c 6.7   - obesity with metabolic syndrome  - diabetes with steroid induced hypoglycemia  - c/w decadron and taper iso ARDS  - pituitary workup, steroids as above given recent weight gain but likely volume though  - c/w FS Q6h and moderate ISS, may need to adjust as tapering steroids    SKin/Lines  #Access  #Cellulitis   #DTI  - L subclavian TLC inserted 2/25   - L radial artery line 2/25  - RIJ ECMO cannula 2/26 at 20 cm  - R fem ECMO cannula 2/26 at 25 cm  - candidal intertrigo +/- cellulitis of the pannus - clotrimazole cream 1% BID 2/26 -+ abx as per above  - as per nursing staff, patient with DTI to right buttocks and B/L feet   - wound consult placed     #Blisters  Blisters noted soles of b/l feet   - VA STEPHEN/PVR to evaluate for PAD  - podiatry consulted for worsening of b/l foot blisters- recs vascular consult  - Vascular consult placed- f/u recs  - VA duplex b/l LE ordered          GOC   - full code   - palliative involved  and following while on ECMO   - Mother involved in care and life partner      40 yo M w/ class III obesity, pAfib (no AC), HTN, BEA (on CPAP), history of b/l papilledema attributed to pseudotumor cerebri, who is transferred from [place] on 2/26 for VV ECMO 2/2 ARDS. Patient initially presented to [place] on 2/24 for SOB and b/l LE edema. As per chart review, patient endorses some degree of SOB and b/l LE edema x 3 months with significant weight gain. As per ICU team, patient had positive sick contacts with viral URI 1 to 2 weeks PTA and since then has had worsening SOB. Patient found to be reportedly hypoxemic to 70% on RA with worsening respiratory status/secretions and somnolence in the ED. Patient was a difficult intubation (~6 attempts). Despite optimal vent management, patient remained hypoxemic. Unable to prone due to obesity. Patient cannulated for VV ECMO on 2/25 and transferred to [facility] for further management.     Neuro  #Mental status   #pseudotumor cerebri  - history of pseudotumor cerebri s/p LP under fluoro in 2020 with high opening pressure with MRI 2020 also showed possible pituitary mass but unclear because of pressure effect from pseudotumor cerebri causing partially empty sella. Considering to start acetazolamide but never started  - prolactin high (30), defer checking cortisol axis given already received steroids but should be investigated, ACTH (<1.5) low but should be suppressed iso steroids  - concern for pituitary adenoma, however TSH .79 normal,  fT4 0.9 normal, and low T3 67  - sedated with Dilaudid, low dose propofol, and precedex gtt. Goal to wean dilaudid slightly today as tolerated   - with lowered sedation pt awake, EASLEY, able to follow commands, however became restless pulling at lines and ETT  - continue seroquel 25mg BID to assist with sedation wean, previously held and dose lowered iso high QTc, adjust dose as tolerated   - paralytics d/c'd 2/27   - PT/OT following     Cardiovascular  #HTN   #Atrial fibrillation   - Hx of a. fib not on AC   - echo from 2024 did not demonstrate elevation of pulmonary pressures, but now with severe PH suggesting chronicity on TTE at    - TTE on 2/25 showed LV EF 61%, enlarged RV with TAPSE 2.1cm, ePASP at least 49 (Patient had 10/2024 TTE with normal LV and RV with ePASP 14).  - ROBERT 2/26 showing VTI 17  - ROBERT 3/1 showing mild TR, enlarged RV with normal LV/RV function. PEEP increased to 24 without signs of RV dilation  - troponins initially elevated, peaked at 162 however downtrended   - s/p esmolol drip for high flows now D/C'd may need to consider again if need for higher flows  - cardene gtt started for hypertension, held since 3/3  - ECG daily to monitor QTc currently 466    Pulmonary  #Pulmonary HTN  #ARDS  #Multifocal Pneumonia   #BEA   - history of diagnosed BEA/?OHS intermittent compliance to home PAP  - c/f acute on suspected chronic pulmonary hypertension in setting of possible BEA/OHS c/b pneumonia, ARDS, and pulmonary edema   - CTA chest on 2/24 negative for pulmonary embolism, notable for patchy bilateral lower lobe opacities, hepatomegaly, mild ascites, edema/induration of the abdominal pannus.  - Full RVP and BAL cultures negative    - Bronchoscopy demonstrated copious purulent secretions 2/26 and 2/27  - Bronchoscopy 3/1 noted with small amount of old, dried blood at R and L mainstem, not occluding airway  - Bronchoscopy 3/5 with evidence of bleeding in the LLL and the distal trachea/RMSB lateral aspect of the bifurcation. Follow up BAL  - s/p Dexamethasone 20mg x 5 days, now on 10 mg x5 days and taper as per clinical status pneumonia/ARDS  - C/w duoNeb q6 hours + 3% saline and q12 IPV   - c/w PC/AC PIP:32  RR 14 PC: 12 PEEP:20 Fio2 40% ~500-900ml  - difficult glottic visualization due to large tongue upon intubation at [facility] 2/25  - ETT migrated x2 most likely post IPV, needed to be pushed in via bronch, will watch closely (should be 28 @lip)  - plan for tracheostomy and possible peg placement Friday 3/7 by CT surgery in OR    #ECMO  VV ECMO		  Cannulation sites/dates:  Flow: 4.49	       P1: 175		Delta P: 33  RPM: 3090	       P2: 142		SVO2: 83  Sweep: 0.5 L/min:		            FIO2: 0.21      - was requiring high flows for oxygenation causing high delta P but D/V adequate, no lactate and SVO2 WNL with some hemolysis but now resolving   - Clinically perfusing. Lactate: 2.3  - ECMO sweep sighing q1hr   - Adjust circuit flow as tolerated with DO2:VO2 goal >2  - Monitor for hemolysis, chatter, evidence of recirculation, mixing      Gastrointestinal  #Diet   #Transaminitis  - tolerating tube feeds  - plan NPO after midnight 3/6 for trach/peg placement in OR 3/7  - elevated Tbili likely iso hemolysis 2/2 high ECMO flows, mild transaminitis now improving  - hepatomegaly and mild ascites noted on CT A/P - no pocket to tap  - RUQ ultrasound with steatosis    - c/w bowel regimen with Senna, Miralax and movantik  - S/p Relistor now x 5 doses, magnesium citrate, reglan, fleets enema, and lactulose 10mg q3, had large bm 3/5  - triglycerides 155  - nutrition following       Gu/Renal  #ELLA  #Hypernatremia   #Non-AG Metabolic Alkalosis  - ELLA - improved, likely ATN/vascular congestion  - good UO, overall net negative  - c/w free h20 250ml q6 hr for hypernatremia  - s/p intermittent diuresis, started lasix gtt 3/2 to assist with aggressive diuresis, monitor urine and electrolytes closely   - contraction alkalosis likely 2/2 diuretics, given albumin 250ml x1, acetazolamide 500mg, lasix gtt discontinued 3/4   - Lasix 40mg IVP BID frequency increased to TID    Infectious disease  #leukocytosis  #Pneumonia   #cellulitis   - Leukocytosis now likely iso steroids, remains afebrile  - s/p cefepime, linezolid, and aztihro 2/25-2/26   - s/p vanco (2/26-3/1) and c/w zosyn for a total of ~10 days (2/26-3/8) to cover cellulitis   - full RVP negative  - BCx x 2 2/24 and 2/26 currently NGTD, UCx 2/25 NGTD, tracheal aspirate Cx from 2/25 with normal commensal kyle   - strep pneumo Ag and urine legionella negative   - s/p Mupiricon b/l nares for +MSSA  (2/26-3/3)  - BAL cultures 3/1 currently NGTD, previous BAL 2/26 NGTD  - follow up BAL sent 3/5  - ? penile meatus yellow discharge, GC/chamydia and HIV negative   - candidal intertrigo +/- cellulitis of the pannus - clotrimazole cream 1% BID 2/26 -+ abx as per above    Hematology/DVT ppx  #Anticoagulation   #Thrombocytopenia  #Anemia   - Hb stable 11.2, thrombocytopenia likely r/t shearing 2/2 ECMO circuit, now improving  - 4T score low probability of HIT, received heparin at OSH, HIT/SHAWANDA sent, f/u  - c/w argatroban with goal 50-70  - LE duplex 2/25 neg  - s/p 1 u PRBC 2/26 for O2 delivery    Endocrine  #DM  -A1c 6.7   - obesity with metabolic syndrome  - diabetes with steroid induced hypoglycemia  - c/w decadron and taper iso ARDS  - pituitary workup, steroids as above given recent weight gain but likely volume though  - c/w FS Q6h and moderate ISS, may need to adjust as tapering steroids    SKin/Lines  #Access  #Cellulitis   #DTI  - L subclavian TLC inserted 2/25   - L radial artery line 2/25  - RIJ ECMO cannula 2/26 at 20 cm  - R fem ECMO cannula 2/26 at 25 cm  - candidal intertrigo +/- cellulitis of the pannus - clotrimazole cream 1% BID 2/26 -+ abx as per above  - as per nursing staff, patient with DTI to right buttocks and B/L feet   - wound consult placed     #Blisters  Blisters noted soles of b/l feet   - VA STEPHEN/PVR to evaluate for PAD  - podiatry consulted for worsening of b/l foot blisters- recs vascular consult  - Vascular consult placed- f/u recs  - VA duplex b/l LE ordered          GOC   - full code   - palliative involved  and following while on ECMO   - Mother involved in care and life partner      40 yo M w/ class III obesity, pAfib (no AC), HTN, BEA (on CPAP), history of b/l papilledema attributed to pseudotumor cerebri, who is transferred from [place] on 2/26 for VV ECMO 2/2 ARDS. Patient initially presented to [place] on 2/24 for SOB and b/l LE edema. As per chart review, patient endorses some degree of SOB and b/l LE edema x 3 months with significant weight gain. As per ICU team, patient had positive sick contacts with viral URI 1 to 2 weeks PTA and since then has had worsening SOB. Patient found to be reportedly hypoxemic to 70% on RA with worsening respiratory status/secretions and somnolence in the ED. Patient was a difficult intubation (~6 attempts). Despite optimal vent management, patient remained hypoxemic. Unable to prone due to obesity. Patient cannulated for VV ECMO on 2/25 and transferred to [facility] for further management.     Neuro  #Mental status   #pseudotumor cerebri  - history of pseudotumor cerebri s/p LP under fluoro in 2020 with high opening pressure with MRI 2020 also showed possible pituitary mass but unclear because of pressure effect from pseudotumor cerebri causing partially empty sella. Considering to start acetazolamide but never started  - prolactin high (30), defer checking cortisol axis given already received steroids but should be investigated, ACTH (<1.5) low but should be suppressed iso steroids  - concern for pituitary adenoma, however TSH .79 normal,  fT4 0.9 normal, and low T3 67  - sedated with Dilaudid, low dose propofol, and precedex gtt. Goal to wean dilaudid slightly today as tolerated   - with lowered sedation pt awake, EASLEY, able to follow commands, however became restless pulling at lines and ETT  - continue seroquel 25mg BID to assist with sedation wean, previously held and dose lowered iso high QTc, adjust dose as tolerated   - paralytics d/c'd 2/27   - PT/OT following     Cardiovascular  #HTN   #Atrial fibrillation   - Hx of a. fib not on AC   - echo from 2024 did not demonstrate elevation of pulmonary pressures, but now with severe PH suggesting chronicity on TTE at    - TTE on 2/25 showed LV EF 61%, enlarged RV with TAPSE 2.1cm, ePASP at least 49 (Patient had 10/2024 TTE with normal LV and RV with ePASP 14).  - ROBERT 2/26 showing VTI 17  - ROBERT 3/1 showing mild TR, enlarged RV with normal LV/RV function. PEEP increased to 24 without signs of RV dilation  - troponins initially elevated, peaked at 162 however downtrended   - s/p esmolol drip for high flows now D/C'd may need to consider again if need for higher flows  - cardene gtt restarted for hypertension sbp goal 120-160  - hydralazine 25mg PO TID to assist weaning off cardene gtt  - ECG daily to monitor QTc currently 466    Pulmonary  #Pulmonary HTN  #ARDS  #Multifocal Pneumonia   #BEA   - history of diagnosed BEA/?OHS intermittent compliance to home PAP  - c/f acute on suspected chronic pulmonary hypertension in setting of possible BEA/OHS c/b pneumonia, ARDS, and pulmonary edema   - CTA chest on 2/24 negative for pulmonary embolism, notable for patchy bilateral lower lobe opacities, hepatomegaly, mild ascites, edema/induration of the abdominal pannus.  - Full RVP and BAL cultures negative    - Bronchoscopy demonstrated copious purulent secretions 2/26 and 2/27  - Bronchoscopy 3/1 noted with small amount of old, dried blood at R and L mainstem, not occluding airway  - Bronchoscopy 3/5 with evidence of bleeding in the LLL and the distal trachea/RMSB lateral aspect of the bifurcation. Follow up BAL  - s/p Dexamethasone 20mg x 5 days, now on 10 mg x5 days and taper as per clinical status pneumonia/ARDS  - C/w duoNeb q6 hours + 3% saline and q12 IPV   - c/w PC/AC PIP:32  RR 14 PC: 12 PEEP:20 Fio2 40% ~500-900ml  - difficult glottic visualization due to large tongue upon intubation at [facility] 2/25  - ETT migrated x2 most likely post IPV, needed to be pushed in via bronch, will watch closely (should be 28 @lip)  - plan for tracheostomy and possible peg placement Friday 3/7 by CT surgery in OR    #ECMO  VV ECMO		  Cannulation sites/dates:  Flow: 4.49	       P1: 175		Delta P: 33  RPM: 3090	       P2: 142		SVO2: 83  Sweep: 0.5 L/min:		            FIO2: 0.21      - was requiring high flows for oxygenation causing high delta P but D/V adequate, no lactate and SVO2 WNL with some hemolysis but now resolving   - Clinically perfusing. Lactate: 2.3  - ECMO sweep sighing q1hr   - Adjust circuit flow as tolerated with DO2:VO2 goal >2  - Monitor for hemolysis, chatter, evidence of recirculation, mixing      Gastrointestinal  #Diet   #Transaminitis  - tolerating tube feeds  - plan NPO after midnight 3/6 for trach/peg placement in OR 3/7  - elevated Tbili likely iso hemolysis 2/2 high ECMO flows, mild transaminitis now improving  - hepatomegaly and mild ascites noted on CT A/P - no pocket to tap  - RUQ ultrasound with steatosis    - c/w bowel regimen with Senna, Miralax and movantik  - S/p Relistor now x 5 doses, magnesium citrate, reglan, fleets enema, and lactulose 10mg q3, had large bm 3/5  - triglycerides 155  - nutrition following       Gu/Renal  #ELLA  #Hypernatremia   #Non-AG Metabolic Alkalosis  - ELLA - improved, likely ATN/vascular congestion  - good UO, overall net negative  - c/w free h20 250ml q6 hr for hypernatremia  - s/p intermittent diuresis, started lasix gtt 3/2 to assist with aggressive diuresis, monitor urine and electrolytes closely   - contraction alkalosis likely 2/2 diuretics, given albumin 250ml x1, acetazolamide 500mg, lasix gtt discontinued 3/4   - Lasix 40mg IVP BID frequency increased to TID    Infectious disease  #leukocytosis  #Pneumonia   #cellulitis   - Leukocytosis now likely iso steroids, remains afebrile  - s/p cefepime, linezolid, and aztihro 2/25-2/26   - s/p vanco (2/26-3/1) and c/w zosyn for a total of ~10 days (2/26-3/8) to cover cellulitis   - full RVP negative  - BCx x 2 2/24 and 2/26 currently NGTD, UCx 2/25 NGTD, tracheal aspirate Cx from 2/25 with normal commensal kyle   - strep pneumo Ag and urine legionella negative   - s/p Mupiricon b/l nares for +MSSA  (2/26-3/3)  - BAL cultures 3/1 currently NGTD, previous BAL 2/26 NGTD  - follow up BAL sent 3/5  - ? penile meatus yellow discharge, GC/chamydia and HIV negative   - candidal intertrigo +/- cellulitis of the pannus - clotrimazole cream 1% BID 2/26 -+ abx as per above    Hematology/DVT ppx  #Anticoagulation   #Thrombocytopenia  #Anemia   - Hb stable 11.2, thrombocytopenia likely r/t shearing 2/2 ECMO circuit, now improving  - 4T score low probability of HIT, received heparin at OSH, HIT/SHAWANDA sent, f/u  - c/w argatroban with goal 50-70  - LE duplex 2/25 neg  - s/p 1 u PRBC 2/26 for O2 delivery    Endocrine  #DM  -A1c 6.7   - obesity with metabolic syndrome  - diabetes with steroid induced hypoglycemia  - c/w decadron and taper iso ARDS  - pituitary workup, steroids as above given recent weight gain but likely volume though  - c/w FS Q6h and moderate ISS, may need to adjust as tapering steroids    SKin/Lines  #Access  #Cellulitis   #DTI  - L subclavian TLC inserted 2/25   - L radial artery line 2/25  - RIJ ECMO cannula 2/26 at 20 cm  - R fem ECMO cannula 2/26 at 25 cm  - candidal intertrigo +/- cellulitis of the pannus - clotrimazole cream 1% BID 2/26 -+ abx as per above  - as per nursing staff, patient with DTI to right buttocks and B/L feet   - wound consult placed     #Blisters  Blisters noted soles of b/l feet   - VA STEPHEN/PVR to evaluate for PAD  - podiatry consulted for worsening of b/l foot blisters- recs vascular consult  - Vascular consult placed- f/u recs  - VA duplex b/l LE ordered          GOC   - full code   - palliative involved  and following while on ECMO   - Mother involved in care and life partner      40 yo M w/ class III obesity, pAfib (no AC), HTN, BEA (on CPAP), history of b/l papilledema attributed to pseudotumor cerebri, who is transferred from [place] on 2/26 for VV ECMO 2/2 ARDS. Patient initially presented to [place] on 2/24 for SOB and b/l LE edema. As per chart review, patient endorses some degree of SOB and b/l LE edema x 3 months with significant weight gain. As per ICU team, patient had positive sick contacts with viral URI 1 to 2 weeks PTA and since then has had worsening SOB. Patient found to be reportedly hypoxemic to 70% on RA with worsening respiratory status/secretions and somnolence in the ED. Patient was a difficult intubation (~6 attempts). Despite optimal vent management, patient remained hypoxemic. Unable to prone due to obesity. Patient cannulated for VV ECMO on 2/25 and transferred to [facility] for further management.     Neuro  #Mental status   #pseudotumor cerebri  - history of pseudotumor cerebri s/p LP under fluoro in 2020 with high opening pressure with MRI 2020 also showed possible pituitary mass but unclear because of pressure effect from pseudotumor cerebri causing partially empty sella. Considering to start acetazolamide but never started  - prolactin high (30), defer checking cortisol axis given already received steroids but should be investigated, ACTH (<1.5) low but should be suppressed iso steroids  - concern for pituitary adenoma, however TSH .79 normal,  fT4 0.9 normal, and low T3 67  - sedated with Dilaudid, low dose propofol, and precedex gtt. Goal to wean dilaudid slightly today as tolerated   - with lowered sedation pt awake, EASLEY, able to follow commands, however became restless pulling at lines and ETT  - continue seroquel 25mg BID to assist with sedation wean, previously held and dose lowered iso high QTc, adjust dose as tolerated   - paralytics d/c'd 2/27   - PT/OT following     Cardiovascular  #HTN   #Atrial fibrillation   - Hx of a. fib not on AC   - echo from 2024 did not demonstrate elevation of pulmonary pressures, but now with severe PH suggesting chronicity on TTE at    - TTE on 2/25 showed LV EF 61%, enlarged RV with TAPSE 2.1cm, ePASP at least 49 (Patient had 10/2024 TTE with normal LV and RV with ePASP 14).  - ROBERT 2/26 showing VTI 17  - ROBERT 3/1 showing mild TR, enlarged RV with normal LV/RV function. PEEP increased to 24 without signs of RV dilation  - troponins initially elevated, peaked at 162 however downtrended   - s/p esmolol drip for high flows now D/C'd may need to consider again if need for higher flows  - briefly restarted cardene gtt for hypertension sbp 180's  - hydralazine 25mg PO TID for HTN and assist weaning off cardene gtt  - ECG daily to monitor QTc currently 466    Pulmonary  #Pulmonary HTN  #ARDS  #Multifocal Pneumonia   #BEA   - history of diagnosed BEA/?OHS intermittent compliance to home PAP  - c/f acute on suspected chronic pulmonary hypertension in setting of possible BEA/OHS c/b pneumonia, ARDS, and pulmonary edema   - CTA chest on 2/24 negative for pulmonary embolism, notable for patchy bilateral lower lobe opacities, hepatomegaly, mild ascites, edema/induration of the abdominal pannus.  - Full RVP and BAL cultures negative    - Bronchoscopy demonstrated copious purulent secretions 2/26 and 2/27  - Bronchoscopy 3/1 noted with small amount of old, dried blood at R and L mainstem, not occluding airway  - Bronchoscopy 3/5 with evidence of bleeding in the LLL and the distal trachea/RMSB lateral aspect of the bifurcation. Follow up BAL  - s/p Dexamethasone 20mg x 5 days, now on 10 mg x5 days and taper as per clinical status pneumonia/ARDS  - C/w duoNeb q6 hours + 3% saline and q12 IPV   - c/w PC/AC PIP:32  RR 14 PC: 12 PEEP:20 Fio2 40% ~500-900ml  - difficult glottic visualization due to large tongue upon intubation at [facility] 2/25  - ETT migrated x2 most likely post IPV, needed to be pushed in via bronch, will watch closely (should be 28 @lip)  - plan for tracheostomy and possible peg placement Friday 3/7 by CT surgery in OR    #ECMO  VV ECMO		  Cannulation sites/dates:  Flow: 4.49	       P1: 175		Delta P: 33  RPM: 3090	       P2: 142		SVO2: 83  Sweep: 0.5 L/min:		            FIO2: 0.21      - was requiring high flows for oxygenation causing high delta P but D/V adequate, no lactate and SVO2 WNL with some hemolysis but now resolving   - Clinically perfusing. Lactate: 2.3  - ECMO sweep sighing q1hr   - Adjust circuit flow as tolerated with DO2:VO2 goal >2  - Monitor for hemolysis, chatter, evidence of recirculation, mixing      Gastrointestinal  #Diet   #Transaminitis  - tolerating tube feeds  - plan NPO after midnight 3/6 for trach/peg placement in OR 3/7  - elevated Tbili likely iso hemolysis 2/2 high ECMO flows, mild transaminitis now improving  - hepatomegaly and mild ascites noted on CT A/P - no pocket to tap  - RUQ ultrasound with steatosis    - c/w bowel regimen with Senna, Miralax and movantik  - S/p Relistor now x 5 doses, magnesium citrate, reglan, fleets enema, and lactulose 10mg q3, had large bm 3/5  - triglycerides 155  - nutrition following       Gu/Renal  #ELLA  #Hypernatremia   #Non-AG Metabolic Alkalosis  - ELLA - improved, likely ATN/vascular congestion  - good UO, overall net negative  - c/w free h20 250ml q6 hr for hypernatremia  - s/p intermittent diuresis, started lasix gtt 3/2 to assist with aggressive diuresis, monitor urine and electrolytes closely   - contraction alkalosis likely 2/2 diuretics, given albumin 250ml x1, acetazolamide 500mg, lasix gtt discontinued 3/4   - Lasix 40mg IVP BID frequency increased to TID    Infectious disease  #leukocytosis  #Pneumonia   #cellulitis   - Leukocytosis now likely iso steroids, remains afebrile  - s/p cefepime, linezolid, and aztihro 2/25-2/26   - s/p vanco (2/26-3/1) and c/w zosyn for a total of ~10 days (2/26-3/8) to cover cellulitis   - full RVP negative  - BCx x 2 2/24 and 2/26 currently NGTD, UCx 2/25 NGTD, tracheal aspirate Cx from 2/25 with normal commensal kyle   - strep pneumo Ag and urine legionella negative   - s/p Mupiricon b/l nares for +MSSA  (2/26-3/3)  - BAL cultures 3/1 currently NGTD, previous BAL 2/26 NGTD  - follow up BAL sent 3/5  - ? penile meatus yellow discharge, GC/chamydia and HIV negative   - candidal intertrigo +/- cellulitis of the pannus - clotrimazole cream 1% BID 2/26 -+ abx as per above    Hematology/DVT ppx  #Anticoagulation   #Thrombocytopenia  #Anemia   - Hb stable 11.2, thrombocytopenia likely r/t shearing 2/2 ECMO circuit, now improving  - 4T score low probability of HIT, received heparin at OSH, HIT/SHAWANDA sent, f/u  - c/w argatroban with goal 50-70  - LE duplex 2/25 neg  - s/p 1 u PRBC 2/26 for O2 delivery    Endocrine  #DM  -A1c 6.7   - obesity with metabolic syndrome  - diabetes with steroid induced hypoglycemia  - c/w decadron and taper iso ARDS  - pituitary workup, steroids as above given recent weight gain but likely volume though  - c/w FS Q6h and moderate ISS, may need to adjust as tapering steroids    SKin/Lines  #Access  #Cellulitis   #DTI  - L subclavian TLC inserted 2/25   - L radial artery line 2/25  - RIJ ECMO cannula 2/26 at 20 cm  - R fem ECMO cannula 2/26 at 25 cm  - candidal intertrigo +/- cellulitis of the pannus - clotrimazole cream 1% BID 2/26 -+ abx as per above  - as per nursing staff, patient with DTI to right buttocks and B/L feet   - wound consult placed     #Blisters  Blisters noted soles of b/l feet   - VA STEPHEN/PVR to evaluate for PAD  - podiatry consulted for worsening of b/l foot blisters- recs vascular consult  - Vascular consult placed- f/u recs  - VA duplex b/l LE ordered          GOC   - full code   - palliative involved  and following while on ECMO   - Mother involved in care and life partner

## 2025-03-05 NOTE — CONSULT NOTE ADULT - SUBJECTIVE AND OBJECTIVE BOX
HPI:  40 yo M w/ class III obesity, pAfib (no AC), HTN, BEA (on CPAP), history of b/l papilledema attributed to pseudotumor cerebri s/p LP in 2024 with very high opening pressure, who is transferred for VV ECMO for ARDS and severe hypoxia. Patient initially presented to [place] on 2/24 for SOB and b/l LE edema. 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 He is a current every day smoker of Newports and marijuana, but does not drink or do other drugs. Currently lives with his mother. There is a long term partner in his life, but they are not , and they have an on/off relationship currently not very involved with each other as per the patient's mother and aunt.  At [facility] where he was getting an eval last year for shortness of breath, he had a sleep study and was diagnosed with sleep apnea, prescribed a PAP machine, which he has in his room but the mother is not sure how many nights per week he uses it. Recently, the last day or two, his mother and brother have been under the weather with URIs and the patient 2 days ago started to develop a ruynny nose, ough, some wheezing of the chest, as well as worsening shortness of breath and fevers at home. He called his aunt who told him to go get checked out and then he landed at the [place] ED. Patient found to be reportedly hypoxemic to 70% on RA with worsening respiratory status/secretions and somnolence in the ED. Patient was intubated with difficulty (7 attempts) due to very large tongue secretions. Despite optimal vent management, patient remained hypoxemic. Unable to prone due to obesity. Patient cannulated for VV ECMO on 2/25 and transferred to [facility] for further management.   MICU consulted thoracic for FB trach, EGD, possible PEG           PAST MEDICAL & SURGICAL HISTORY:  HTN (hypertension)      Atrial fibrillation  paroxysmal      Papilledema of both eyes      Chronic sinusitis      Morbid obesity      No significant past surgical history          REVIEW OF SYSTEMS  pt intubated and sedated  unable to provide ROS      MEDICATIONS  (STANDING):  albuterol/ipratropium for Nebulization 3 milliLiter(s) Nebulizer every 6 hours  argatroban Infusion 1.1 MICROgram(s)/kG/Min (15.9 mL/Hr) IV Continuous <Continuous>  chlorhexidine 0.12% Liquid 15 milliLiter(s) Oral Mucosa every 12 hours  chlorhexidine 2% Cloths 1 Application(s) Topical <User Schedule>  clotrimazole 1% Cream 1 Application(s) Topical two times a day  dexAMETHasone  IVPB 10 milliGRAM(s) IV Intermittent daily  dexMEDEtomidine Infusion 1 MICROgram(s)/kG/Hr (60.2 mL/Hr) IV Continuous <Continuous>  dextrose 50% Injectable 25 Gram(s) IV Push once  dextrose 50% Injectable 12.5 Gram(s) IV Push once  dextrose 50% Injectable 25 Gram(s) IV Push once  furosemide   Injectable 40 milliGRAM(s) IV Push every 8 hours  glucagon  Injectable 1 milliGRAM(s) IntraMuscular once  HYDROmorphone Infusion. 1 mG/Hr (1 mL/Hr) IV Continuous <Continuous>  insulin lispro (ADMELOG) corrective regimen sliding scale   SubCutaneous every 6 hours  multivitamin/minerals/iron Oral Solution (CENTRUM) 15 milliLiter(s) Oral daily  pantoprazole  Injectable 40 milliGRAM(s) IV Push daily  petrolatum Ophthalmic Ointment 1 Application(s) Both EYES every 12 hours  piperacillin/tazobactam IVPB.. 4.5 Gram(s) IV Intermittent every 8 hours  polyethylene glycol 3350 17 Gram(s) Oral two times a day  propofol Infusion 25 MICROgram(s)/kG/Min (36.1 mL/Hr) IV Continuous <Continuous>  QUEtiapine 25 milliGRAM(s) Oral every 12 hours  senna Syrup 10 milliLiter(s) Oral two times a day  sodium chloride 3%  Inhalation 4 milliLiter(s) Inhalation every 6 hours    MEDICATIONS  (PRN):      Allergies  No Known Allergies  Intolerances        SOCIAL HISTORY:  Occupation: [employer] employee  Smoking Hx: denies  Etoh Hx: denies  IVDA Hx: denies    FAMILY HISTORY:    unless noted, no significant family hx with Mother, Father, Siblings    Vital Signs Last 24 Hrs  T(C): 35.9 (05 Mar 2025 08:00), Max: 35.9 (05 Mar 2025 08:00)  T(F): 96.6 (05 Mar 2025 08:00), Max: 96.6 (05 Mar 2025 08:00)  HR: 76 (05 Mar 2025 11:51) (54 - 95)  BP: --  BP(mean): --  RR: 14 (05 Mar 2025 11:00) (14 - 14)  SpO2: 98% (05 Mar 2025 11:51) (94% - 100%)    Parameters below as of 05 Mar 2025 09:13  Patient On (Oxygen Delivery Method): ventilator        PHYSICAL EXAM:  General: sedated, on ECMO circuit  Respiratory: intubated   abdomen soft, NT, ND, + bowel sounds, no surgical scars      LABS:                        11.7   14.51 )-----------( 112      ( 05 Mar 2025 10:05 )             42.6     03-05    144  |  106  |  35[H]  ----------------------------<  191[H]  4.2   |  28  |  0.81    Ca    8.5      05 Mar 2025 10:05  Phos  4.3     03-05  Mg     2.20     03-05    TPro  6.4  /  Alb  2.9[L]  /  TBili  1.0  /  DBili  x   /  AST  51[H]  /  ALT  122[H]  /  AlkPhos  58  03-05    PT/INR - ( 05 Mar 2025 10:05 )   PT: 24.4 sec;   INR: 2.07 ratio         PTT - ( 05 Mar 2025 10:05 )  PTT:62.7 sec  Urinalysis Basic - ( 05 Mar 2025 10:05 )    Color: x / Appearance: x / SG: x / pH: x  Gluc: 191 mg/dL / Ketone: x  / Bili: x / Urobili: x   Blood: x / Protein: x / Nitrite: x   Leuk Esterase: x / RBC: x / WBC x   Sq Epi: x / Non Sq Epi: x / Bacteria: x        RADIOLOGY & ADDITIONAL STUDIES: reviewed        PAST MEDICAL & SURGICAL HISTORY:  HTN (hypertension)    Atrial fibrillation  paroxysmal    Papilledema of both eyes    Chronic sinusitis    Morbid obesity    No significant past surgical history          HEALTH ISSUES - R/O PROBLEM Dx: request for FB trach, EGD, possible PEG      PLAN:  pt booked for FB, trach, EGD, possible PEG  will communicate with MICU team when to hold argatroban  pt will need valid type and screen, coagulation factors for OR  NPO p MN Thurs 3/6  rest of care  as per MICU team    Sophie SCOTT [phone]

## 2025-03-05 NOTE — CHART NOTE - NSCHARTNOTEFT_GEN_A_CORE
Follow-up with family to address psychosocial needs, assess coping with recent diagnosis & prognosis, and provide emotional support.  Pt's father, Sunny, at bedside. Patient remains critically ill receiving ECMO and mechanical ventilation.  Father endorses receiving daily medical updates from the ICU team which he shares with his family. He is aware of plan for tracheostomy and peg placement tentatively for Friday 3/7 by CT surgery in OR.  Father states he is "leaning" into his eden during this time and prays for his sons recovery. He and family remain hopeful that Sunny will recover and be liberated from ECMO and eventually the ventilator. He understands that Sunny has a long road ahead of him and shared that family are all supporting each other during this difficult time. Family appreciates clear communication regarding treatment plans and prognosis.  Father is receptive to chaplaincy support.  This writer will remain available for continued emotional support for family. Family are aware of palliative care availability and support.

## 2025-03-05 NOTE — PROGRESS NOTE ADULT - ASSESSMENT
38 yo M w/ class III obesity, pAfib (no AC), HTN, BEA (on CPAP), history of b/l papilledema attributed to pseudotumor cerebri s/p LP in 2024 with very high opening pressure, who is transferred for VV ECMO for ARDS and severe hypoxia. Patient cannulated for VV ECMO on 2/25 and transferred to [facility] for further management. Palliative care consulted for goals of care and complex medical decision making; patient currently on ECMO.

## 2025-03-05 NOTE — CHART NOTE - NSCHARTNOTEFT_GEN_A_CORE
: Chance    INDICATION: hypoxemia    PROCEDURE:  [x] LIMITED ECHO  [x] LIMITED CHEST  [ ] LIMITED RETROPERITONEAL  [ ] LIMITED ABDOMINAL  [ ] LIMITED DVT  [ ] NEEDLE GUIDANCE VASCULAR  [ ] NEEDLE GUIDANCE THORACENTESIS  [ ] NEEDLE GUIDANCE PARACENTESIS  [ ] NEEDLE GUIDANCE PERICARDIOCENTESIS  [ ] OTHER    FINDINGS/INTERPRETATION: Moderately technically difficult views. Bibasilar consolidation posteriorly, improved from prior. Mostly a-lines anteriorly, b-lines are seen in occasional areas associated with a somewhat irregular pleura. Lung sliding seen bilaterally. LV systolic function and size normal without any septal flattening. VTI 17 cm, consistent with prior. RV mildly enlarged. IVC with ECMO cannula distal to hepatic vein. IVC is enlarged around 2-2.5 of unclear significant in the presence of VV ECMO.    Images in [employer] : Jitendra Godoy MD    INDICATION: hypoxemia    PROCEDURE:  [x] LIMITED ECHO  [x] LIMITED CHEST  [ ] LIMITED RETROPERITONEAL  [ ] LIMITED ABDOMINAL  [ ] LIMITED DVT  [ ] NEEDLE GUIDANCE VASCULAR  [ ] NEEDLE GUIDANCE THORACENTESIS  [ ] NEEDLE GUIDANCE PARACENTESIS  [ ] NEEDLE GUIDANCE PERICARDIOCENTESIS  [ ] OTHER    FINDINGS/INTERPRETATION: Moderately technically difficult views. Bibasilar consolidation posteriorly, improved from prior. Mostly a-lines anteriorly, b-lines are seen in occasional areas associated with a somewhat irregular pleura. Lung sliding seen bilaterally. LV systolic function and size normal without any septal flattening. VTI 17 cm, consistent with prior. RV mildly enlarged. IVC with ECMO cannula distal to hepatic vein. IVC is enlarged around 2-2.5 of unclear significant in the presence of VV ECMO.    Images in Q[employer]

## 2025-03-05 NOTE — PROGRESS NOTE ADULT - ASSESSMENT
39M PMHx class III obesity, pAfib (no AC), HTN, BEA (on CPAP), pseudotumor cerebri s/p LP who is transferred for VV ECMO for ARDS and severe hypoxia. Patient initially presented to [place] on 2/24 for SOB and b/l LE edema where he was intubated but remained hypoxic requiring VV ECMO. While in MICU, patient noted to have b/l plantar blisters. Vascular consulted to rule out possible ischemic etiology.     Rec  - no acute vascular intervention, b/l LEs WWP, good cap refill, and with + triphasic pedal signals. Unable to palpate pulses in setting of swelling  - b/l blisters likely pressure related - recommend z flow boots for offloading  - Recommend STEPHEN/PVR with TPs  - c/w LWC  - care per MICU    Vascular surgery [id number]   39M PMHx class III obesity, pAfib (no AC), HTN, BEA (on CPAP), pseudotumor cerebri s/p LP who is transferred for VV ECMO for ARDS and severe hypoxia. Patient initially presented to [place] on 2/24 for SOB and b/l LE edema where he was intubated but remained hypoxic requiring VV ECMO. While in MICU, patient noted to have b/l plantar blisters. Vascular consulted to rule out possible ischemic etiology.     Rec  - no acute vascular intervention, b/l LEs WWP, good cap refill, and with + triphasic pedal signals. Unable to palpate pulses in setting of swelling  - b/l blisters likely pressure related - recommend z flow boots for offloading  - Recommend STEPHEN/PVR with TPs; No arterial disease and b/l 3 vessel runoff on VA duplex performed 3/5  - c/w LWC  - care per MICU    Vascular surgery [id number]

## 2025-03-05 NOTE — PROGRESS NOTE ADULT - PROBLEM SELECTOR PLAN 2
LNOK: both parents: Erica Quintanilla (mother) [phone], Sunny (Dad) [phone]; Erica gave permission for team to speak with Sunny (dad) and also to iMli Beaver  (pt's aunt /Erica's sister)   Pt also has GF Brea Mari but she would not be legal decision maker;     Goal currently for continued treatment in the ICU with ECMO with hope for improvements; plan for trach/PEG on 3/7 with CT surgery    Palliative team continues to follow for ongoing support for patient on ECMO circuit. Discussed case with MICU team caring for patient who are continuing to provide communication with interdisciplinary teams and patient's family regarding patient's clinical status and medical updates.

## 2025-03-05 NOTE — PROCEDURE NOTE - NSBRONCHPROCDETAILS_GEN_A_CORE_FT
Indication: ARDS    Operators: Chance    Preop medications: midazolam, propofol, fentanyl, hydromorphone    Findings:  Bronchoscope inserted through ETT. ETT noted to be in good position. Airway evaluation revealed some old blood clots without evidence of bleeding in the LLL and the distal trachea/RMSB lateral aspect of the bifurcation. Otherwise secretion burden was minimal. Lavage returned clear/mildly cloudy fluid without blood in LLL. Bronchoscope then withdrawn from ETT.    Specimens: LLL BAL Indication: ARDS    Operators: Chance    Preop medications: midazolam, propofol, fentanyl, hydromorphone    Findings:  Bronchoscope inserted through ETT. ETT noted to be in good position. Sabina sharp, normal mucosa. Airway evaluation revealed some old blood clots without evidence of active bleeding in the LLL and the distal trachea/RMSB lateral aspect of the bifurcation. Moderate scattered thick secretions noted. Lavage returned clear/mildly cloudy fluid without blood in LLL. Bronchoscope then withdrawn from ETT.    Specimens: LLL BAL

## 2025-03-05 NOTE — PROGRESS NOTE ADULT - PROBLEM SELECTOR PLAN 3
Palliative care consulted for goals of care and complex medical decision making.    In the event of newly developing, evolving, or worsening symptoms, please contact the Palliative Medicine team via pager (if the patient is at [facility] #[phone] or if the patient is at [facility] #[phone]) The Geriatric and Palliative Medicine service has coverage 24 hours a day/ 7 days a week to provide medical recommendations regarding symptom management needs via telephone.

## 2025-03-05 NOTE — PROGRESS NOTE ADULT - PROBLEM SELECTOR PLAN 1
Presented for hypoxia, reported to hypoxic despite ventilator support   Started on VV ECMO 2/25  Patient intubated and sedated, unable to participate in conversation; weaning sedation, off paralytics,   Concern for PNA, was on Vanc/Zosyn for ARDS, now on Zosyn until 3/8  C/b hypertension and afib, s/p espolol gtt; also fluid overload requiring diuresis  Care per MICU Team

## 2025-03-05 NOTE — PROGRESS NOTE ADULT - SUBJECTIVE AND OBJECTIVE BOX
TEAM [ C ] Surgery Daily Progress Note  =====================================================  SUBJECTIVE: Patient seen and examined at bedside on AM rounds. Pt intubated on VV ECMO. LE blisters without significant change.     PMH:   PAST MEDICAL & SURGICAL HISTORY:  HTN (hypertension)    Atrial fibrillation  paroxysmal    Papilledema of both eyes    Chronic sinusitis    Morbid obesity    No significant past surgical history    ALLERGIES:  No Known Allergies  --------------------------------------------------------------------------------------  MEDICATIONS:    Neurologic Medications  dexMEDEtomidine Infusion 1 MICROgram(s)/kG/Hr IV Continuous <Continuous>  HYDROmorphone Infusion. 1 mG/Hr IV Continuous <Continuous>  propofol Infusion 25 MICROgram(s)/kG/Min IV Continuous <Continuous>  QUEtiapine 25 milliGRAM(s) Oral every 12 hours    Respiratory Medications  albuterol/ipratropium for Nebulization 3 milliLiter(s) Nebulizer every 6 hours  sodium chloride 3%  Inhalation 4 milliLiter(s) Inhalation every 6 hours    Cardiovascular Medications  furosemide   Injectable 40 milliGRAM(s) IV Push every 8 hours    Gastrointestinal Medications  multivitamin/minerals/iron Oral Solution (CENTRUM) 15 milliLiter(s) Oral daily  pantoprazole  Injectable 40 milliGRAM(s) IV Push daily  polyethylene glycol 3350 17 Gram(s) Oral two times a day  senna Syrup 10 milliLiter(s) Oral two times a day    Genitourinary Medications    Hematologic/Oncologic Medications  argatroban Infusion 1.1 MICROgram(s)/kG/Min IV Continuous <Continuous>    Antimicrobial/Immunologic Medications  piperacillin/tazobactam IVPB.. 4.5 Gram(s) IV Intermittent every 8 hours    Endocrine/Metabolic Medications  dexAMETHasone  IVPB 10 milliGRAM(s) IV Intermittent daily  dextrose 50% Injectable 25 Gram(s) IV Push once  dextrose 50% Injectable 12.5 Gram(s) IV Push once  dextrose 50% Injectable 25 Gram(s) IV Push once  glucagon  Injectable 1 milliGRAM(s) IntraMuscular once  insulin lispro (ADMELOG) corrective regimen sliding scale   SubCutaneous every 6 hours    Topical/Other Medications  chlorhexidine 0.12% Liquid 15 milliLiter(s) Oral Mucosa every 12 hours  chlorhexidine 2% Cloths 1 Application(s) Topical <User Schedule>  clotrimazole 1% Cream 1 Application(s) Topical two times a day  petrolatum Ophthalmic Ointment 1 Application(s) Both EYES every 12 hours  --------------------------------------------------------------------------------------  VITAL SIGNS:    T(C): 35.8 (03-05-25 @ 12:00), Max: 35.9 (03-05-25 @ 08:00)  HR: 72 (03-05-25 @ 12:00) (54 - 95)  BP: --  RR: 14 (03-05-25 @ 12:00) (14 - 14)  SpO2: 95% (03-05-25 @ 12:00) (94% - 100%)    --------------------------------------------------------------------------------------  PHYSICAL EXAM:  General: sedated  Respiratory: intubated   Extremities: WWP, b/l LE edema, b/l plantar blister noted without evidence of infection, +ds DP/PT b/l, groins soft, R fem and RIJ ECMO cannula c/d/i  --------------------------------------------------------------------------------------  LABS                        11.7   14.51 )-----------( 112      ( 05 Mar 2025 10:05 )             42.6   03-05    144  |  106  |  35[H]  ----------------------------<  191[H]  4.2   |  28  |  0.81    Ca    8.5      05 Mar 2025 10:05  Phos  4.3     03-05  Mg     2.20     03-05    TPro  6.4  /  Alb  2.9[L]  /  TBili  1.0  /  DBili  x   /  AST  51[H]  /  ALT  122[H]  /  AlkPhos  58  03-05  -------------------------------------------------------------------------------------  INS AND OUTS:    03-04-25 @ 07:01  -  03-05-25 @ 07:00  --------------------------------------------------------  IN: 6131.2 mL / OUT: 7185 mL / NET: -1053.8 mL    03-05-25 @ 07:01  -  03-05-25 @ 13:02  --------------------------------------------------------  IN: 1418.3 mL / OUT: 1335 mL / NET: 83.3 mL  --------------------------------------------------------------------------------------

## 2025-03-06 LAB
ALBUMIN SERPL ELPH-MCNC: 2.8 G/DL — LOW (ref 3.3–5)
ALBUMIN SERPL ELPH-MCNC: 2.9 G/DL — LOW (ref 3.3–5)
ALP SERPL-CCNC: 49 U/L — SIGNIFICANT CHANGE UP (ref 40–120)
ALP SERPL-CCNC: 52 U/L — SIGNIFICANT CHANGE UP (ref 40–120)
ALP SERPL-CCNC: 53 U/L — SIGNIFICANT CHANGE UP (ref 40–120)
ALP SERPL-CCNC: 57 U/L — SIGNIFICANT CHANGE UP (ref 40–120)
ALT FLD-CCNC: 81 U/L — HIGH (ref 4–41)
ALT FLD-CCNC: 86 U/L — HIGH (ref 4–41)
ALT FLD-CCNC: 97 U/L — HIGH (ref 4–41)
ALT FLD-CCNC: 99 U/L — HIGH (ref 4–41)
ANION GAP SERPL CALC-SCNC: 11 MMOL/L — SIGNIFICANT CHANGE UP (ref 7–14)
ANION GAP SERPL CALC-SCNC: 8 MMOL/L — SIGNIFICANT CHANGE UP (ref 7–14)
ANION GAP SERPL CALC-SCNC: 9 MMOL/L — SIGNIFICANT CHANGE UP (ref 7–14)
ANION GAP SERPL CALC-SCNC: 9 MMOL/L — SIGNIFICANT CHANGE UP (ref 7–14)
APTT BLD: 56.6 SEC — HIGH (ref 24.5–35.6)
APTT BLD: 57.2 SEC — HIGH (ref 24.5–35.6)
APTT BLD: 57.7 SEC — HIGH (ref 24.5–35.6)
APTT BLD: 65.9 SEC — HIGH (ref 24.5–35.6)
AST SERPL-CCNC: 35 U/L — SIGNIFICANT CHANGE UP (ref 4–40)
AST SERPL-CCNC: 37 U/L — SIGNIFICANT CHANGE UP (ref 4–40)
AST SERPL-CCNC: 38 U/L — SIGNIFICANT CHANGE UP (ref 4–40)
AST SERPL-CCNC: 40 U/L — SIGNIFICANT CHANGE UP (ref 4–40)
BASOPHILS # BLD AUTO: 0 K/UL — SIGNIFICANT CHANGE UP (ref 0–0.2)
BASOPHILS # BLD AUTO: 0.01 K/UL — SIGNIFICANT CHANGE UP (ref 0–0.2)
BASOPHILS # BLD AUTO: 0.01 K/UL — SIGNIFICANT CHANGE UP (ref 0–0.2)
BASOPHILS # BLD AUTO: 0.02 K/UL — SIGNIFICANT CHANGE UP (ref 0–0.2)
BASOPHILS NFR BLD AUTO: 0 % — SIGNIFICANT CHANGE UP (ref 0–2)
BASOPHILS NFR BLD AUTO: 0.1 % — SIGNIFICANT CHANGE UP (ref 0–2)
BASOPHILS NFR BLD AUTO: 0.1 % — SIGNIFICANT CHANGE UP (ref 0–2)
BASOPHILS NFR BLD AUTO: 0.2 % — SIGNIFICANT CHANGE UP (ref 0–2)
BILIRUB SERPL-MCNC: 1.1 MG/DL — SIGNIFICANT CHANGE UP (ref 0.2–1.2)
BILIRUB SERPL-MCNC: 1.1 MG/DL — SIGNIFICANT CHANGE UP (ref 0.2–1.2)
BILIRUB SERPL-MCNC: 1.2 MG/DL — SIGNIFICANT CHANGE UP (ref 0.2–1.2)
BILIRUB SERPL-MCNC: 1.2 MG/DL — SIGNIFICANT CHANGE UP (ref 0.2–1.2)
BLOOD GAS ARTERIAL - LYTES,HGB,ICA,LACT RESULT: SIGNIFICANT CHANGE UP
BLOOD GAS ECMO POST MEMBRANE - ARTERIAL RESULT: SIGNIFICANT CHANGE UP
BLOOD GAS ECMO PRE MEMBRANE - VENOUS RESULT: SIGNIFICANT CHANGE UP
BUN SERPL-MCNC: 35 MG/DL — HIGH (ref 7–23)
BUN SERPL-MCNC: 36 MG/DL — HIGH (ref 7–23)
BUN SERPL-MCNC: 37 MG/DL — HIGH (ref 7–23)
BUN SERPL-MCNC: 37 MG/DL — HIGH (ref 7–23)
CA-I BLD-SCNC: 1.12 MMOL/L — LOW (ref 1.15–1.29)
CALCIUM SERPL-MCNC: 8.3 MG/DL — LOW (ref 8.4–10.5)
CALCIUM SERPL-MCNC: 8.4 MG/DL — SIGNIFICANT CHANGE UP (ref 8.4–10.5)
CALCIUM SERPL-MCNC: 8.4 MG/DL — SIGNIFICANT CHANGE UP (ref 8.4–10.5)
CALCIUM SERPL-MCNC: 8.5 MG/DL — SIGNIFICANT CHANGE UP (ref 8.4–10.5)
CHLORIDE SERPL-SCNC: 106 MMOL/L — SIGNIFICANT CHANGE UP (ref 98–107)
CHLORIDE SERPL-SCNC: 107 MMOL/L — SIGNIFICANT CHANGE UP (ref 98–107)
CHLORIDE SERPL-SCNC: 108 MMOL/L — HIGH (ref 98–107)
CHLORIDE SERPL-SCNC: 109 MMOL/L — HIGH (ref 98–107)
CO2 SERPL-SCNC: 27 MMOL/L — SIGNIFICANT CHANGE UP (ref 22–31)
CO2 SERPL-SCNC: 29 MMOL/L — SIGNIFICANT CHANGE UP (ref 22–31)
CO2 SERPL-SCNC: 29 MMOL/L — SIGNIFICANT CHANGE UP (ref 22–31)
CO2 SERPL-SCNC: 30 MMOL/L — SIGNIFICANT CHANGE UP (ref 22–31)
CREAT SERPL-MCNC: 0.72 MG/DL — SIGNIFICANT CHANGE UP (ref 0.5–1.3)
CREAT SERPL-MCNC: 0.74 MG/DL — SIGNIFICANT CHANGE UP (ref 0.5–1.3)
CREAT SERPL-MCNC: 0.76 MG/DL — SIGNIFICANT CHANGE UP (ref 0.5–1.3)
CREAT SERPL-MCNC: 0.78 MG/DL — SIGNIFICANT CHANGE UP (ref 0.5–1.3)
CULTURE RESULTS: SIGNIFICANT CHANGE UP
EGFR: 118 ML/MIN/1.73M2 — SIGNIFICANT CHANGE UP
EGFR: 118 ML/MIN/1.73M2 — SIGNIFICANT CHANGE UP
EGFR: 119 ML/MIN/1.73M2 — SIGNIFICANT CHANGE UP
EGFR: 119 ML/MIN/1.73M2 — SIGNIFICANT CHANGE UP
EGFR: 120 ML/MIN/1.73M2 — SIGNIFICANT CHANGE UP
EGFR: 120 ML/MIN/1.73M2 — SIGNIFICANT CHANGE UP
EGFR: 121 ML/MIN/1.73M2 — SIGNIFICANT CHANGE UP
EGFR: 121 ML/MIN/1.73M2 — SIGNIFICANT CHANGE UP
EOSINOPHIL # BLD AUTO: 0 K/UL — SIGNIFICANT CHANGE UP (ref 0–0.5)
EOSINOPHIL # BLD AUTO: 0.02 K/UL — SIGNIFICANT CHANGE UP (ref 0–0.5)
EOSINOPHIL NFR BLD AUTO: 0 % — SIGNIFICANT CHANGE UP (ref 0–6)
EOSINOPHIL NFR BLD AUTO: 0.1 % — SIGNIFICANT CHANGE UP (ref 0–6)
GLUCOSE BLDC GLUCOMTR-MCNC: 175 MG/DL — HIGH (ref 70–99)
GLUCOSE BLDC GLUCOMTR-MCNC: 182 MG/DL — HIGH (ref 70–99)
GLUCOSE BLDC GLUCOMTR-MCNC: 187 MG/DL — HIGH (ref 70–99)
GLUCOSE BLDC GLUCOMTR-MCNC: 189 MG/DL — HIGH (ref 70–99)
GLUCOSE SERPL-MCNC: 158 MG/DL — HIGH (ref 70–99)
GLUCOSE SERPL-MCNC: 172 MG/DL — HIGH (ref 70–99)
GLUCOSE SERPL-MCNC: 173 MG/DL — HIGH (ref 70–99)
GLUCOSE SERPL-MCNC: 174 MG/DL — HIGH (ref 70–99)
HAPTOGLOB SERPL-MCNC: <20 MG/DL — LOW (ref 34–200)
HCT VFR BLD CALC: 35.9 % — LOW (ref 39–50)
HCT VFR BLD CALC: 37.3 % — LOW (ref 39–50)
HCT VFR BLD CALC: 38.2 % — LOW (ref 39–50)
HCT VFR BLD CALC: 39.5 % — SIGNIFICANT CHANGE UP (ref 39–50)
HGB BLD-MCNC: 10.3 G/DL — LOW (ref 13–17)
HGB BLD-MCNC: 10.6 G/DL — LOW (ref 13–17)
HGB BLD-MCNC: 10.9 G/DL — LOW (ref 13–17)
HGB BLD-MCNC: 11 G/DL — LOW (ref 13–17)
IANC: 11 K/UL — HIGH (ref 1.8–7.4)
IANC: 11.26 K/UL — HIGH (ref 1.8–7.4)
IANC: 11.67 K/UL — HIGH (ref 1.8–7.4)
IANC: 9.19 K/UL — HIGH (ref 1.8–7.4)
IMM GRANULOCYTES NFR BLD AUTO: 0.5 % — SIGNIFICANT CHANGE UP (ref 0–0.9)
IMM GRANULOCYTES NFR BLD AUTO: 0.6 % — SIGNIFICANT CHANGE UP (ref 0–0.9)
IMM GRANULOCYTES NFR BLD AUTO: 0.8 % — SIGNIFICANT CHANGE UP (ref 0–0.9)
IMM GRANULOCYTES NFR BLD AUTO: 1.7 % — HIGH (ref 0–0.9)
INR BLD: 1.94 RATIO — HIGH (ref 0.85–1.16)
INR BLD: 1.94 RATIO — HIGH (ref 0.85–1.16)
INR BLD: 1.99 RATIO — HIGH (ref 0.85–1.16)
INR BLD: 2.19 RATIO — HIGH (ref 0.85–1.16)
LDH SERPL L TO P-CCNC: 513 U/L — HIGH (ref 135–225)
LYMPHOCYTES # BLD AUTO: 0.56 K/UL — LOW (ref 1–3.3)
LYMPHOCYTES # BLD AUTO: 0.61 K/UL — LOW (ref 1–3.3)
LYMPHOCYTES # BLD AUTO: 0.61 K/UL — LOW (ref 1–3.3)
LYMPHOCYTES # BLD AUTO: 1.19 K/UL — SIGNIFICANT CHANGE UP (ref 1–3.3)
LYMPHOCYTES # BLD AUTO: 4.2 % — LOW (ref 13–44)
LYMPHOCYTES # BLD AUTO: 4.8 % — LOW (ref 13–44)
LYMPHOCYTES # BLD AUTO: 5.6 % — LOW (ref 13–44)
LYMPHOCYTES # BLD AUTO: 8.7 % — LOW (ref 13–44)
MAGNESIUM SERPL-MCNC: 1.9 MG/DL — SIGNIFICANT CHANGE UP (ref 1.6–2.6)
MAGNESIUM SERPL-MCNC: 2 MG/DL — SIGNIFICANT CHANGE UP (ref 1.6–2.6)
MCHC RBC-ENTMCNC: 22.9 PG — LOW (ref 27–34)
MCHC RBC-ENTMCNC: 23 PG — LOW (ref 27–34)
MCHC RBC-ENTMCNC: 27.8 G/DL — LOW (ref 32–36)
MCHC RBC-ENTMCNC: 28.4 G/DL — LOW (ref 32–36)
MCHC RBC-ENTMCNC: 28.5 G/DL — LOW (ref 32–36)
MCHC RBC-ENTMCNC: 28.7 G/DL — LOW (ref 32–36)
MCV RBC AUTO: 80.3 FL — SIGNIFICANT CHANGE UP (ref 80–100)
MCV RBC AUTO: 80.6 FL — SIGNIFICANT CHANGE UP (ref 80–100)
MCV RBC AUTO: 81.1 FL — SIGNIFICANT CHANGE UP (ref 80–100)
MCV RBC AUTO: 82.1 FL — SIGNIFICANT CHANGE UP (ref 80–100)
MONOCYTES # BLD AUTO: 0.8 K/UL — SIGNIFICANT CHANGE UP (ref 0–0.9)
MONOCYTES # BLD AUTO: 0.87 K/UL — SIGNIFICANT CHANGE UP (ref 0–0.9)
MONOCYTES # BLD AUTO: 1.04 K/UL — HIGH (ref 0–0.9)
MONOCYTES # BLD AUTO: 1.12 K/UL — HIGH (ref 0–0.9)
MONOCYTES NFR BLD AUTO: 6 % — SIGNIFICANT CHANGE UP (ref 2–14)
MONOCYTES NFR BLD AUTO: 6.9 % — SIGNIFICANT CHANGE UP (ref 2–14)
MONOCYTES NFR BLD AUTO: 8.2 % — SIGNIFICANT CHANGE UP (ref 2–14)
MONOCYTES NFR BLD AUTO: 9.5 % — SIGNIFICANT CHANGE UP (ref 2–14)
NEUTROPHILS # BLD AUTO: 11 K/UL — HIGH (ref 1.8–7.4)
NEUTROPHILS # BLD AUTO: 11.26 K/UL — HIGH (ref 1.8–7.4)
NEUTROPHILS # BLD AUTO: 11.67 K/UL — HIGH (ref 1.8–7.4)
NEUTROPHILS # BLD AUTO: 9.19 K/UL — HIGH (ref 1.8–7.4)
NEUTROPHILS NFR BLD AUTO: 82.4 % — HIGH (ref 43–77)
NEUTROPHILS NFR BLD AUTO: 84.2 % — HIGH (ref 43–77)
NEUTROPHILS NFR BLD AUTO: 87.5 % — HIGH (ref 43–77)
NEUTROPHILS NFR BLD AUTO: 87.9 % — HIGH (ref 43–77)
NRBC # BLD AUTO: 0 K/UL — SIGNIFICANT CHANGE UP (ref 0–0)
NRBC # FLD: 0 K/UL — SIGNIFICANT CHANGE UP (ref 0–0)
NRBC BLD AUTO-RTO: 0 /100 WBCS — SIGNIFICANT CHANGE UP (ref 0–0)
PHOSPHATE SERPL-MCNC: 2.6 MG/DL — SIGNIFICANT CHANGE UP (ref 2.5–4.5)
PHOSPHATE SERPL-MCNC: 3.4 MG/DL — SIGNIFICANT CHANGE UP (ref 2.5–4.5)
PLATELET # BLD AUTO: 94 K/UL — LOW (ref 150–400)
PLATELET # BLD AUTO: 97 K/UL — LOW (ref 150–400)
PLATELET # BLD AUTO: 97 K/UL — LOW (ref 150–400)
PLATELET # BLD AUTO: 98 K/UL — LOW (ref 150–400)
POTASSIUM SERPL-MCNC: 3.8 MMOL/L — SIGNIFICANT CHANGE UP (ref 3.5–5.3)
POTASSIUM SERPL-MCNC: 3.8 MMOL/L — SIGNIFICANT CHANGE UP (ref 3.5–5.3)
POTASSIUM SERPL-MCNC: 3.9 MMOL/L — SIGNIFICANT CHANGE UP (ref 3.5–5.3)
POTASSIUM SERPL-MCNC: 4.2 MMOL/L — SIGNIFICANT CHANGE UP (ref 3.5–5.3)
POTASSIUM SERPL-SCNC: 3.8 MMOL/L — SIGNIFICANT CHANGE UP (ref 3.5–5.3)
POTASSIUM SERPL-SCNC: 3.8 MMOL/L — SIGNIFICANT CHANGE UP (ref 3.5–5.3)
POTASSIUM SERPL-SCNC: 3.9 MMOL/L — SIGNIFICANT CHANGE UP (ref 3.5–5.3)
POTASSIUM SERPL-SCNC: 4.2 MMOL/L — SIGNIFICANT CHANGE UP (ref 3.5–5.3)
PROT SERPL-MCNC: 5.8 G/DL — LOW (ref 6–8.3)
PROT SERPL-MCNC: 6 G/DL — SIGNIFICANT CHANGE UP (ref 6–8.3)
PROTHROM AB SERPL-ACNC: 22.3 SEC — HIGH (ref 9.9–13.4)
PROTHROM AB SERPL-ACNC: 22.4 SEC — HIGH (ref 9.9–13.4)
PROTHROM AB SERPL-ACNC: 22.9 SEC — HIGH (ref 9.9–13.4)
PROTHROM AB SERPL-ACNC: 25.2 SEC — HIGH (ref 9.9–13.4)
RBC # BLD: 4.47 M/UL — SIGNIFICANT CHANGE UP (ref 4.2–5.8)
RBC # BLD: 4.6 M/UL — SIGNIFICANT CHANGE UP (ref 4.2–5.8)
RBC # BLD: 4.74 M/UL — SIGNIFICANT CHANGE UP (ref 4.2–5.8)
RBC # BLD: 4.81 M/UL — SIGNIFICANT CHANGE UP (ref 4.2–5.8)
RBC # FLD: 20.8 % — HIGH (ref 10.3–14.5)
RBC # FLD: 20.8 % — HIGH (ref 10.3–14.5)
RBC # FLD: 20.9 % — HIGH (ref 10.3–14.5)
RBC # FLD: 21 % — HIGH (ref 10.3–14.5)
SODIUM SERPL-SCNC: 144 MMOL/L — SIGNIFICANT CHANGE UP (ref 135–145)
SODIUM SERPL-SCNC: 145 MMOL/L — SIGNIFICANT CHANGE UP (ref 135–145)
SODIUM SERPL-SCNC: 146 MMOL/L — HIGH (ref 135–145)
SODIUM SERPL-SCNC: 147 MMOL/L — HIGH (ref 135–145)
SPECIMEN SOURCE: SIGNIFICANT CHANGE UP
TRIGL SERPL-MCNC: 120 MG/DL — SIGNIFICANT CHANGE UP
WBC # BLD: 10.92 K/UL — HIGH (ref 3.8–10.5)
WBC # BLD: 12.58 K/UL — HIGH (ref 3.8–10.5)
WBC # BLD: 13.28 K/UL — HIGH (ref 3.8–10.5)
WBC # BLD: 13.67 K/UL — HIGH (ref 3.8–10.5)
WBC # FLD AUTO: 10.92 K/UL — HIGH (ref 3.8–10.5)
WBC # FLD AUTO: 12.58 K/UL — HIGH (ref 3.8–10.5)
WBC # FLD AUTO: 13.28 K/UL — HIGH (ref 3.8–10.5)
WBC # FLD AUTO: 13.67 K/UL — HIGH (ref 3.8–10.5)

## 2025-03-06 PROCEDURE — 99291 CRITICAL CARE FIRST HOUR: CPT | Mod: 25,GC

## 2025-03-06 PROCEDURE — 31624 DX BRONCHOSCOPE/LAVAGE: CPT | Mod: GC

## 2025-03-06 PROCEDURE — 33948 ECMO/ECLS DAILY MGMT-VENOUS: CPT | Mod: GC

## 2025-03-06 PROCEDURE — 99292 CRITICAL CARE ADDL 30 MIN: CPT | Mod: 25,GC

## 2025-03-06 PROCEDURE — 93317 ECHO TRANSESOPHAGEAL: CPT | Mod: 26,GC

## 2025-03-06 RX ORDER — DEXAMETHASONE 0.5 MG/1
10 TABLET ORAL DAILY
Refills: 0 | Status: COMPLETED | OUTPATIENT
Start: 2025-03-07 | End: 2025-03-08

## 2025-03-06 RX ORDER — ALBUMIN (HUMAN) 12.5 G/50ML
250 INJECTION, SOLUTION INTRAVENOUS ONCE
Refills: 0 | Status: COMPLETED | OUTPATIENT
Start: 2025-03-06 | End: 2025-03-06

## 2025-03-06 RX ORDER — PIPERACILLIN-TAZO-DEXTROSE,ISO 2.25G/50ML
4.5 IV SOLUTION, PIGGYBACK PREMIX FROZEN(ML) INTRAVENOUS EVERY 8 HOURS
Refills: 0 | Status: DISCONTINUED | OUTPATIENT
Start: 2025-03-06 | End: 2025-03-09

## 2025-03-06 RX ORDER — NALOXEGOL OXALATE 12.5 MG/1
12.5 TABLET, FILM COATED ORAL DAILY
Refills: 0 | Status: DISCONTINUED | OUTPATIENT
Start: 2025-03-07 | End: 2025-03-10

## 2025-03-06 RX ORDER — HYDROMORPHONE/SOD CHLOR,ISO/PF 2 MG/10 ML
1 SYRINGE (ML) INJECTION
Qty: 100 | Refills: 0 | Status: DISCONTINUED | OUTPATIENT
Start: 2025-03-06 | End: 2025-03-08

## 2025-03-06 RX ORDER — QUETIAPINE FUMARATE 25 MG/1
25 TABLET ORAL
Refills: 0 | Status: DISCONTINUED | OUTPATIENT
Start: 2025-03-06 | End: 2025-03-07

## 2025-03-06 RX ORDER — POLYETHYLENE GLYCOL 3350 17 G/17G
17 POWDER, FOR SOLUTION ORAL DAILY
Refills: 0 | Status: DISCONTINUED | OUTPATIENT
Start: 2025-03-07 | End: 2025-04-23

## 2025-03-06 RX ORDER — QUETIAPINE FUMARATE 25 MG/1
50 TABLET ORAL
Refills: 0 | Status: DISCONTINUED | OUTPATIENT
Start: 2025-03-06 | End: 2025-03-07

## 2025-03-06 RX ORDER — SENNA 187 MG
5 TABLET ORAL AT BEDTIME
Refills: 0 | Status: DISCONTINUED | OUTPATIENT
Start: 2025-03-06 | End: 2025-03-26

## 2025-03-06 RX ADMIN — POLYETHYLENE GLYCOL 3350 17 GRAM(S): 17 POWDER, FOR SOLUTION ORAL at 05:30

## 2025-03-06 RX ADMIN — ALBUMIN (HUMAN) 125 MILLILITER(S): 12.5 INJECTION, SOLUTION INTRAVENOUS at 12:30

## 2025-03-06 RX ADMIN — Medication 50 MILLIGRAM(S): at 21:09

## 2025-03-06 RX ADMIN — PROPOFOL 36.1 MICROGRAM(S)/KG/MIN: 10 INJECTION, EMULSION INTRAVENOUS at 04:07

## 2025-03-06 RX ADMIN — Medication 4 MILLILITER(S): at 09:00

## 2025-03-06 RX ADMIN — FUROSEMIDE 40 MILLIGRAM(S): 10 INJECTION INTRAMUSCULAR; INTRAVENOUS at 23:27

## 2025-03-06 RX ADMIN — DEXAMETHASONE 102 MILLIGRAM(S): 0.5 TABLET ORAL at 05:32

## 2025-03-06 RX ADMIN — Medication 15 MILLILITER(S): at 17:07

## 2025-03-06 RX ADMIN — PROPOFOL 36.1 MICROGRAM(S)/KG/MIN: 10 INJECTION, EMULSION INTRAVENOUS at 00:37

## 2025-03-06 RX ADMIN — PROPOFOL 36.1 MICROGRAM(S)/KG/MIN: 10 INJECTION, EMULSION INTRAVENOUS at 21:28

## 2025-03-06 RX ADMIN — Medication 25 MG/HR: at 10:08

## 2025-03-06 RX ADMIN — Medication 40 MILLIGRAM(S): at 11:07

## 2025-03-06 RX ADMIN — IPRATROPIUM BROMIDE AND ALBUTEROL SULFATE 3 MILLILITER(S): .5; 2.5 SOLUTION RESPIRATORY (INHALATION) at 20:31

## 2025-03-06 RX ADMIN — Medication 1 APPLICATION(S): at 06:22

## 2025-03-06 RX ADMIN — INSULIN LISPRO 2: 100 INJECTION, SOLUTION INTRAVENOUS; SUBCUTANEOUS at 18:04

## 2025-03-06 RX ADMIN — INSULIN LISPRO 2: 100 INJECTION, SOLUTION INTRAVENOUS; SUBCUTANEOUS at 12:17

## 2025-03-06 RX ADMIN — INSULIN LISPRO 2: 100 INJECTION, SOLUTION INTRAVENOUS; SUBCUTANEOUS at 05:32

## 2025-03-06 RX ADMIN — Medication 1 MG/HR: at 10:08

## 2025-03-06 RX ADMIN — DEXMEDETOMIDINE HYDROCHLORIDE IN SODIUM CHLORIDE 60.2 MICROGRAM(S)/KG/HR: 4 INJECTION INTRAVENOUS at 10:08

## 2025-03-06 RX ADMIN — Medication 4 MILLILITER(S): at 15:35

## 2025-03-06 RX ADMIN — FUROSEMIDE 40 MILLIGRAM(S): 10 INJECTION INTRAMUSCULAR; INTRAVENOUS at 11:07

## 2025-03-06 RX ADMIN — Medication 25 GRAM(S): at 06:21

## 2025-03-06 RX ADMIN — Medication 25 MG/HR: at 20:07

## 2025-03-06 RX ADMIN — PROPOFOL 36.1 MICROGRAM(S)/KG/MIN: 10 INJECTION, EMULSION INTRAVENOUS at 06:21

## 2025-03-06 RX ADMIN — PROPOFOL 36.1 MICROGRAM(S)/KG/MIN: 10 INJECTION, EMULSION INTRAVENOUS at 06:44

## 2025-03-06 RX ADMIN — QUETIAPINE FUMARATE 50 MILLIGRAM(S): 25 TABLET ORAL at 21:09

## 2025-03-06 RX ADMIN — Medication 50 MILLIGRAM(S): at 14:50

## 2025-03-06 RX ADMIN — INSULIN LISPRO 2: 100 INJECTION, SOLUTION INTRAVENOUS; SUBCUTANEOUS at 23:27

## 2025-03-06 RX ADMIN — Medication 4 MILLILITER(S): at 20:31

## 2025-03-06 RX ADMIN — Medication 1 MG/HR: at 20:07

## 2025-03-06 RX ADMIN — IPRATROPIUM BROMIDE AND ALBUTEROL SULFATE 3 MILLILITER(S): .5; 2.5 SOLUTION RESPIRATORY (INHALATION) at 03:25

## 2025-03-06 RX ADMIN — Medication 25 GRAM(S): at 21:09

## 2025-03-06 RX ADMIN — Medication 10 MILLILITER(S): at 05:26

## 2025-03-06 RX ADMIN — Medication 25 GRAM(S): at 14:50

## 2025-03-06 RX ADMIN — ARGATROBAN 15.9 MICROGRAM(S)/KG/MIN: 100 INJECTION, SOLUTION INTRAVENOUS at 20:07

## 2025-03-06 RX ADMIN — FUROSEMIDE 40 MILLIGRAM(S): 10 INJECTION INTRAMUSCULAR; INTRAVENOUS at 17:09

## 2025-03-06 RX ADMIN — PROPOFOL 36.1 MICROGRAM(S)/KG/MIN: 10 INJECTION, EMULSION INTRAVENOUS at 20:07

## 2025-03-06 RX ADMIN — Medication 1 APPLICATION(S): at 17:08

## 2025-03-06 RX ADMIN — QUETIAPINE FUMARATE 25 MILLIGRAM(S): 25 TABLET ORAL at 05:29

## 2025-03-06 RX ADMIN — Medication 50 MILLIGRAM(S): at 05:26

## 2025-03-06 RX ADMIN — DEXMEDETOMIDINE HYDROCHLORIDE IN SODIUM CHLORIDE 60.2 MICROGRAM(S)/KG/HR: 4 INJECTION INTRAVENOUS at 20:08

## 2025-03-06 RX ADMIN — FUROSEMIDE 40 MILLIGRAM(S): 10 INJECTION INTRAMUSCULAR; INTRAVENOUS at 05:33

## 2025-03-06 RX ADMIN — IPRATROPIUM BROMIDE AND ALBUTEROL SULFATE 3 MILLILITER(S): .5; 2.5 SOLUTION RESPIRATORY (INHALATION) at 09:00

## 2025-03-06 RX ADMIN — Medication 4 MILLILITER(S): at 03:25

## 2025-03-06 RX ADMIN — Medication 5 MILLILITER(S): at 21:09

## 2025-03-06 RX ADMIN — Medication 1 APPLICATION(S): at 05:30

## 2025-03-06 RX ADMIN — CLOTRIMAZOLE 1 APPLICATION(S): 1 CREAM TOPICAL at 05:30

## 2025-03-06 RX ADMIN — ARGATROBAN 15.9 MICROGRAM(S)/KG/MIN: 100 INJECTION, SOLUTION INTRAVENOUS at 10:09

## 2025-03-06 RX ADMIN — Medication 15 MILLILITER(S): at 05:26

## 2025-03-06 RX ADMIN — IPRATROPIUM BROMIDE AND ALBUTEROL SULFATE 3 MILLILITER(S): .5; 2.5 SOLUTION RESPIRATORY (INHALATION) at 15:35

## 2025-03-06 RX ADMIN — CLOTRIMAZOLE 1 APPLICATION(S): 1 CREAM TOPICAL at 17:08

## 2025-03-06 RX ADMIN — Medication 15 MILLILITER(S): at 11:07

## 2025-03-06 RX ADMIN — PROPOFOL 36.1 MICROGRAM(S)/KG/MIN: 10 INJECTION, EMULSION INTRAVENOUS at 10:08

## 2025-03-06 NOTE — PROGRESS NOTE ADULT - ASSESSMENT
40 yo M w/ class III obesity, pAfib (no AC), HTN, BEA (on CPAP), history of b/l papilledema attributed to pseudotumor cerebri, who is transferred from [place] on 2/26 for VV ECMO 2/2 ARDS. Patient initially presented to [place] on 2/24 for SOB and b/l LE edema. As per chart review, patient endorses some degree of SOB and b/l LE edema x 3 months with significant weight gain. As per ICU team, patient had positive sick contacts with viral URI 1 to 2 weeks PTA and since then has had worsening SOB. Patient found to be reportedly hypoxemic to 70% on RA with worsening respiratory status/secretions and somnolence in the ED. Patient was a difficult intubation (~6 attempts). Despite optimal vent management, patient remained hypoxemic. Unable to prone due to obesity. Patient cannulated for VV ECMO on 2/25 and transferred to [facility] for further management.     Neuro  #Mental status   #pseudotumor cerebri  - history of pseudotumor cerebri s/p LP under fluoro in 2024 with high opening pressure with MRI 2024 also showed possible pituitary mass but unclear because of pressure effect from pseudotumor cerebri causing partially empty sella. Considering to start acetazolamide outpatient but never started  - prolactin high (30), defer checking cortisol axis given already received steroids but should be investigated, ACTH (<1.5) low but should be suppressed iso steroids  - concern for pituitary adenoma, however TSH .79 normal,  fT4 0.9 normal, and low T3 67  - sedated with Dilaudid, low dose propofol, and precedex gtt. Goal to wean dilaudid slightly today as tolerated   - with lowered sedation pt awake, EASLEY, able to follow commands, however became restless pulling at lines and ETT  - continue seroquel 25mg BID to assist with sedation wean, previously held and dose lowered iso high QTc, adjust dose as tolerated   - paralytics d/c'd 2/27   - PT/OT following     Cardiovascular  #HTN   #Atrial fibrillation   - Hx of a. fib not on AC   - echo from 2024 did not demonstrate elevation of pulmonary pressures, but now with severe PH suggesting chronicity on TTE at    - TTE on 2/25 showed LV EF 61%, enlarged RV with TAPSE 2.1cm, ePASP at least 49 (Patient had 10/2024 TTE with normal LV and RV with ePASP 14).  - ROBERT 2/26 showing VTI 17  - ROBERT 3/1 showing mild TR, enlarged RV with normal LV/RV function. PEEP increased to 24 without signs of RV dilation  - troponins initially elevated, peaked at 162 however downtrended likely from RV dilation and strain - EKG neg for any typical ischemia  - s/p esmolol drip for high flows now D/C'd may need to consider again if need for higher flows  - titrate hydral, may add enalaprilat if persistent hypertension, and assist weaning off Cardene gtt which has been utilized prn   - ECG daily to monitor QTc  as has been on higher end, currently acceptable    Pulmonary  #Pulmonary HTN  #ARDS  #Multifocal Pneumonia   #BEA   - history of diagnosed BEA/?OHS intermittent compliance to home PAP  - c/f acute on suspected chronic pulmonary hypertension in setting of possible BEA/OHS c/b pneumonia, ARDS, and pulmonary edema   - CTA chest on 2/24 negative for pulmonary embolism, notable for patchy bilateral lower lobe opacities, hepatomegaly, mild ascites, edema/induration of the abdominal pannus.  - Full RVP and BAL cultures negative    - Bronchoscopy demonstrated copious purulent secretions 2/26 and 2/27  - Bronchoscopy 3/1 noted with small amount of old, dried blood at R and L mainstem, not occluding airway  - Bronchoscopy 3/5 with evidence of bleeding in the LLL and the distal trachea/RMSB lateral aspect of the bifurcation. BAL ngtd  - s/p Dexamethasone 20mg x 5 days, now on 10 mg x5 days -> taper on day 11 by 2 mg every 2 days  - C/w duoNeb q6 hours + 3% saline - stop IPV due to ET tube migration and minimal secretion burden  - c/w PC/AC PIP:32  RR 14 PC: 12 PEEP:20 Fio2 40% ~500-900ml  - difficult glottic visualization due to large tongue upon intubation at [facility] 2/25 but able to visualize here easily with S4 video laryngoscope  - plan for tracheostomy and possible peg placement Friday 3/7 by CT surgery in OR    #ECMO  VV ECMO		  Cannulation sites/dates:  Flow: 4.49	       P1: 175		Delta P: 33  RPM: 3090	       P2: 142		SVO2: 83  Sweep: 0.5 L/min:		            FIO2: 0.21      - was requiring high flows for oxygenation causing high delta P but D/V adequate, no lactate and SVO2 WNL with some hemolysis and minor oozing form L subclavian line  - Clinically perfusing.  - ECMO sweep sighing q1hr   - Adjust circuit flow as tolerated with DO2:VO2 goal >2  - Monitor for hemolysis, chatter, evidence of recirculation, mixing      Gastrointestinal  #Diet   #Transaminitis - resolving  - tolerating tube feeds  - plan NPO after midnight 3/6 for trach/peg placement in OR 3/7  - elevated Tbili likely iso hemolysis 2/2 high ECMO flows, mild transaminitis now improving  - hepatomegaly and mild ascites noted on CT A/P - no pocket to tap  - RUQ ultrasound with steatosis    - c/w bowel regimen with Senna, Miralax and Movantik - having BMs adequately from 3/5 with aggressive regimen  - titrate bowel regimen as per BMs  - triglycerides have been low  - nutrition following       Gu/Renal  #LELA  #Hypernatremia   #Non-AG Metabolic Alkalosis  - ELLA - improved, likely ATN/vascular congestion  - good UO, overall net negative  - c/w free h20 250ml q6 hr for hypernatremia  - s/p intermittent diuresis, started lasix gtt 3/2 to assist with aggressive diuresis, monitor urine and electrolytes closely   - contraction alkalosis likely 2/2 diuretics, given albumin 250ml x1, acetazolamide 500mg, lasix gtt discontinued 3/4   - Lasix 40mg q6 h    Infectious disease  #leukocytosis  #Pneumonia   #cellulitis   - Leukocytosis now likely iso steroids, remains afebrile  - s/p cefepime, linezolid, and aztihro 2/25-2/26   - s/p vanco (2/26-3/1) and c/w zosyn for a total of ~10 days (2/26-3/8) to cover cellulitis/pneumonia - will check CT chest some time after tracheostomy and decannulation to assess progression of pneumonia may extend course as there is some c/f poss clinically stable necrotizing pneumonia - total abx course duration tbd  - full RVP negative  - BCx x 2 2/24 and 2/26 currently NGTD, UCx 2/25 NGTD, tracheal aspirate Cx from 2/25 with normal commensal kyle   - strep pneumo Ag and urine legionella negative   - s/p Mupiricon b/l nares for +MSSA  (2/26-3/3)  - BAL cultures 3/1 currently NGTD, previous BAL 2/26 NGTD  - follow up BAL sent 3/5  - ?penile meatus yellow discharge resolved, GC/chamydia and HIV negative   - candidal intertrigo +/- cellulitis of the pannus - clotrimazole cream 1% BID 2/26 -+ abx as per above    Hematology/DVT ppx  #Anticoagulation   #Thrombocytopenia  #Anemia   - Hb stable 11.2, thrombocytopenia likely r/t shearing 2/2 ECMO circuit, now improving  - 4T score low probability of HIT, received heparin at OSH, PF4 neg, SHAWANDA pending  - c/w argatroban with goal 50-70  - LE duplex 2/25 neg  - s/p 1 u PRBC 2/26 for O2 delivery  - may need to consider platelet transfusion as oozing from L subclavian noted    Endocrine  #DM  -A1c 6.7   - obesity with metabolic syndrome  - diabetes with steroid induced hyperglycemia, mild - ISS  - c/w decadron and taper iso ARDS  - pituitary workup largely unremarkable, steroids as above given recent weight gain but likely large component of volume though  - c/w FS Q6h and moderate ISS    SKin/Lines  #Access  #Cellulitis   #DTI with skin sloughing  - L subclavian TLC inserted 2/25   - L radial artery line 2/25  - RIJ ECMO cannula 2/26 at 20 cm  - R fem ECMO cannula 2/26 at 25 cm  - candidal intertrigo +/- cellulitis of the pannus - clotrimazole cream 1% BID 2/26 -+ abx as per above  - DTI to right buttocks (sloughing of skin) and B/L feet   - wound consult placed     #Blisters  Blisters noted soles of b/l feet   - VA STEPHEN/PVR to evaluate for PAD  - podiatry consulted for worsening of b/l foot blisters- recs vascular consult  - Vascular consult placed- f/u recs  - VA duplex b/l LE ordered      GOC   - full code   - palliative involved  and following while on ECMO   - Mother, father involved in care are next of kin/surrogates and patient's girlfriend/partner is also involved but defers to parents     40 yo M w/ class III obesity, pAfib (no AC), HTN, BEA (on CPAP), history of b/l papilledema attributed to pseudotumor cerebri, who is transferred from [place] on 2/26 for VV ECMO 2/2 ARDS. Patient initially presented to [place] on 2/24 for SOB and b/l LE edema. As per chart review, patient endorses some degree of SOB and b/l LE edema x 3 months with significant weight gain. As per ICU team, patient had positive sick contacts with viral URI 1 to 2 weeks PTA and since then has had worsening SOB. Patient found to be reportedly hypoxemic to 70% on RA with worsening respiratory status/secretions and somnolence in the ED. Patient was a difficult intubation (~6 attempts). Despite optimal vent management, patient remained hypoxemic. Unable to prone due to obesity. Patient cannulated for VV ECMO on 2/25 and transferred to [facility] for further management.     Neuro  #Mental status   #pseudotumor cerebri  - history of pseudotumor cerebri s/p LP under fluoro in 2024 with high opening pressure with MRI 2024 also showed possible pituitary mass but unclear because of pressure effect from pseudotumor cerebri causing partially empty sella. Considering to start acetazolamide outpatient but never started  - prolactin high (30), defer checking cortisol axis given already received steroids but should be investigated, ACTH (<1.5) low but should be suppressed iso steroids  - concern for pituitary adenoma, however TSH .79 normal,  fT4 0.9 normal, and low T3 67  - sedated with Dilaudid, low dose propofol, and Precedex gtt wean as tolerated/indicated  - with lowered sedation pt awake, EASLEY, able to follow commands, however became restless pulling at lines and ETT  - Seroquel 25/50 for agitation/ICU delirium -  monitor QTc  - paralytics d/c'd 2/27   - PT/OT following     Cardiovascular  #HTN   #Atrial fibrillation   - Hx of a. fib not on AC   - echo from 2024 did not demonstrate elevation of pulmonary pressures, but now with severe PH suggesting chronicity on TTE at    - TTE on 2/25 showed LV EF 61%, enlarged RV with TAPSE 2.1cm, ePASP at least 49 (Patient had 10/2024 TTE with normal LV and RV with ePASP 14).  - ROBERT 2/26 showing VTI 17  - ROBERT 3/1 showing mild TR, enlarged RV with normal LV/RV function. PEEP increased to 24 without signs of RV dilation  - troponins initially elevated, peaked at 162 however downtrended likely from RV dilation and strain - EKG neg for any typical ischemia  - s/p esmolol drip for high flows now D/C'd may need to consider again if need for higher flows  - titrate hydral, may add enalaprilat if persistent hypertension, and assist weaning off Cardene gtt which has been utilized prn   - ECG daily to monitor QTc  as has been on higher end, currently acceptable    Pulmonary  #Pulmonary HTN  #ARDS  #Multifocal Pneumonia   #BEA   - history of diagnosed BEA/?OHS intermittent compliance to home PAP  - c/f acute on suspected chronic pulmonary hypertension in setting of possible BEA/OHS c/b pneumonia, ARDS, and pulmonary edema   - CTA chest on 2/24 negative for pulmonary embolism, notable for patchy bilateral lower lobe opacities, hepatomegaly, mild ascites, edema/induration of the abdominal pannus.  - Full RVP and BAL cultures negative    - Bronchoscopy demonstrated copious purulent secretions 2/26 and 2/27  - Bronchoscopy 3/1 noted with small amount of old, dried blood at R and L mainstem, not occluding airway  - Bronchoscopy 3/5 with evidence of bleeding in the LLL and the distal trachea/RMSB lateral aspect of the bifurcation. BAL ngtd  - s/p Dexamethasone 20mg x 5 days, now on 10 mg x5 days -> taper on day 11 by 2 mg every 2 days  - C/w duoNeb q6 hours + 3% saline - stop IPV due to ET tube migration and minimal secretion burden  - c/w PC/AC PIP:32  RR 14 PC: 12 PEEP:20 Fio2 40% ~500-900ml  - difficult glottic visualization due to large tongue upon intubation at [facility] 2/25 but able to visualize here easily with S4 video laryngoscope  - plan for tracheostomy and possible peg placement Friday 3/7 by CT surgery in OR    #ECMO  VV ECMO		  Cannulation sites/dates:  Flow: 4.49	       P1: 175		Delta P: 33  RPM: 3090	       P2: 142		SVO2: 83  Sweep: 0.5 L/min:		            FIO2: 0.21      - was requiring high flows for oxygenation causing high delta P but D/V adequate, no lactate and SVO2 WNL with some hemolysis and minor oozing form L subclavian line  - Clinically perfusing.  - ECMO sweep sighing q1hr   - Adjust circuit flow as tolerated with DO2:VO2 goal >2  - Monitor for hemolysis, chatter, evidence of recirculation, mixing      Gastrointestinal  #Diet   #Transaminitis - resolving  - tolerating tube feeds  - plan NPO after midnight 3/6 for trach/peg placement in OR 3/7  - elevated Tbili likely iso hemolysis 2/2 high ECMO flows, mild transaminitis now improving  - hepatomegaly and mild ascites noted on CT A/P - no pocket to tap  - RUQ ultrasound with steatosis    - c/w bowel regimen with Senna, Miralax and Movantik - having BMs adequately from 3/5 with aggressive regimen  - titrate bowel regimen as per BMs  - triglycerides have been low  - nutrition following       Gu/Renal  #ELLA  #Hypernatremia   #Non-AG Metabolic Alkalosis  - ELLA - improved, likely ATN/vascular congestion  - good UO, overall net negative  - c/w free h20 250ml q6 hr for hypernatremia  - s/p intermittent diuresis, started lasix gtt 3/2 to assist with aggressive diuresis, monitor urine and electrolytes closely   - contraction alkalosis likely 2/2 diuretics, given albumin 250ml x1, acetazolamide 500mg, lasix gtt discontinued 3/4   - Lasix 40mg q6 h    Infectious disease  #leukocytosis  #Pneumonia   #cellulitis   - Leukocytosis now likely iso steroids, remains afebrile  - s/p cefepime, linezolid, and aztihro 2/25-2/26   - s/p vanco (2/26-3/1) and c/w zosyn for a total of ~10 days (2/26-3/8) to cover cellulitis/pneumonia - will check CT chest some time after tracheostomy and decannulation to assess progression of pneumonia may extend course as there is some c/f poss clinically stable necrotizing pneumonia - total abx course duration tbd  - full RVP negative  - BCx x 2 2/24 and 2/26 currently NGTD, UCx 2/25 NGTD, tracheal aspirate Cx from 2/25 with normal commensal kyle   - strep pneumo Ag and urine legionella negative   - s/p Mupiricon b/l nares for +MSSA  (2/26-3/3)  - BAL cultures 3/1 currently NGTD, previous BAL 2/26 NGTD  - follow up BAL sent 3/5  - ?penile meatus yellow discharge resolved, GC/chamydia and HIV negative   - candidal intertrigo +/- cellulitis of the pannus - clotrimazole cream 1% BID 2/26 -+ abx as per above    Hematology/DVT ppx  #Anticoagulation   #Thrombocytopenia  #Anemia   - Hb stable 11.2, thrombocytopenia likely r/t shearing 2/2 ECMO circuit, now improving  - 4T score low probability of HIT, received heparin at OSH, PF4 neg, SHAWANDA pending  - c/w argatroban with goal 50-70  - LE duplex 2/25 neg  - s/p 1 u PRBC 2/26 for O2 delivery  - may need to consider platelet transfusion as oozing from L subclavian noted    Endocrine  #DM  -A1c 6.7   - obesity with metabolic syndrome  - diabetes with steroid induced hyperglycemia, mild - ISS  - c/w decadron and taper iso ARDS  - pituitary workup largely unremarkable, steroids as above given recent weight gain but likely large component of volume though  - c/w FS Q6h and moderate ISS    SKin/Lines  #Access  #Cellulitis   #DTI with skin sloughing  - L subclavian TLC inserted 2/25   - L radial artery line 2/25  - RIJ ECMO cannula 2/26 at 20 cm  - R fem ECMO cannula 2/26 at 25 cm  - candidal intertrigo +/- cellulitis of the pannus - clotrimazole cream 1% BID 2/26 -+ abx as per above  - DTI to right buttocks (sloughing of skin) and B/L feet   - wound consult placed     #Blisters  Blisters noted soles of b/l feet   - VA STEPHEN/PVR to evaluate for PAD  - podiatry consulted for worsening of b/l foot blisters- recs vascular consult  - Vascular consult placed- f/u recs  - VA duplex b/l LE ordered      GOC   - full code   - palliative involved  and following while on ECMO   - Mother, father involved in care are next of kin/surrogates and patient's girlfriend/partner is also involved but defers to parents

## 2025-03-06 NOTE — PROGRESS NOTE ADULT - ASSESSMENT
39M PMHx class III obesity, pAfib (no AC), HTN, BEA (on CPAP), pseudotumor cerebri s/p LP who is transferred for VV ECMO for ARDS and severe hypoxia. Patient initially presented to [place] on 2/24 for SOB and b/l LE edema where he was intubated but remained hypoxic requiring VV ECMO. While in MICU, patient noted to have b/l plantar blisters. Vascular consulted to rule out possible ischemic etiology.     Rec  - no acute vascular intervention, b/l LEs WWP, good cap refill, and with + triphasic pedal signals. Unable to palpate pulses in setting of swelling  - b/l blisters likely pressure related - recommend z flow boots for offloading  - No arterial disease and b/l 3 vessel runoff on VA duplex performed 3/5. No indication for further invasive or non-invasive studies  - c/w LWC  - Please call vascular surgery [phone] back for further questions or concerns  - care per MICU    Vascular surgery [id number]

## 2025-03-06 NOTE — PROGRESS NOTE ADULT - CRITICAL CARE ATTENDING COMMENT
Pt seen and examined. 39 year old M with medical hx as noted above now on VV ECMO support 2/2 ARDS in the setting of multifocal bacterial pneumonia, decompensated RV failure, ELLA 2/2 pre-renal ATN, transaminitis and lactic acidosis. Agitated and hypertensive when awake but commands, cont to titrate down Dilaudid gtt as tolerated, remains on Precedex gtt and Propofol gtts. Cont Seroquel BID, increase pm dose to 50 mg HS and follow QTc.  Remains on pressure control ventilation with FiO2 of 40 %, PEEP at 20. ECMO circuit FiO2 titrated down to 21% %, sweep at 0.5, ABG stable. Remains on Zosyn IV for multifocal PNA and cellulitis of abdominal wall. Cxs including BAL Cxs so far unrevealing. Cont pulm toilet  to mobilize secretions. Ongoing hypertension, intermittently requiring a Cardene gtt. Increase hydralazine 50 mg Q8H and cont close monitoring of hemodynamics. Anasraca improving, - 1.9 L L over past 24 hours, cont albumin assisted diuresis, increase Lasix to 40 mg IV Q6H, follow electrolytes Q6H. Cont AC with argatroban gtt, Hb stable, mild thrombocytopenia, self limited oozing from site of subclavian TLC, cont to monitor hemolysis panel and CBC. Now having BMs, abdominal distension improving. B/L plantar blistering lesions improving, cont local wound care and monitor pedal pulses. Plan for trach placement tomorrow. Will hold AC 6 hours prior to procedure. Overall prognosis extremely guarded. Remains critically ill requiring multiple bedside visits and changes in therapy. Full code. Mom and family updated in detail.    Total time spent on VV ECMO management was 15 minutes, separate from time spent on critical care management, procedures, and teaching.

## 2025-03-06 NOTE — PROGRESS NOTE ADULT - ASSESSMENT
38 y/o morbidly obese M w/ PMHx of  pAfib (no AC), HTN, BEA (on CPAP), history of b/l papilledema attributed to pseudotumor cerebri, who is transferred from [place] on 2/26 for VV ECMO 2/2 ARDS.       1. Plan for Tracheostomy & PEG placement at 8:30am tomorrow 3/7  2. Will need to hold Argatroban Drip at midnight and repeat coags at 6am.   3. No need for platelet transfusion  4. Perfusion team aware of case and need to transfer the patient to the OR  5. Pre-op labs  6. Clearance needed present in the chart  7. Discussed with MICU team and Dr. Llanes

## 2025-03-06 NOTE — PROGRESS NOTE ADULT - SUBJECTIVE AND OBJECTIVE BOX
TEAM [ C ] Surgery Daily Progress Note  =====================================================  SUBJECTIVE: Patient seen and examined at bedside on AM rounds. Pt intubated, on ECMO. Subjective limited.     PMH:   PAST MEDICAL & SURGICAL HISTORY:  HTN (hypertension)      Atrial fibrillation  paroxysmal      Papilledema of both eyes      Chronic sinusitis      Morbid obesity      No significant past surgical history          ALLERGIES:  No Known Allergies      --------------------------------------------------------------------------------------    MEDICATIONS:    Neurologic Medications  dexMEDEtomidine Infusion 1 MICROgram(s)/kG/Hr IV Continuous <Continuous>  HYDROmorphone Infusion. 1 mG/Hr IV Continuous <Continuous>  propofol Infusion 25 MICROgram(s)/kG/Min IV Continuous <Continuous>  QUEtiapine 25 milliGRAM(s) Oral <User Schedule>  QUEtiapine 50 milliGRAM(s) Oral <User Schedule>    Respiratory Medications  albuterol/ipratropium for Nebulization 3 milliLiter(s) Nebulizer every 6 hours  sodium chloride 3%  Inhalation 4 milliLiter(s) Inhalation every 6 hours    Cardiovascular Medications  furosemide   Injectable 40 milliGRAM(s) IV Push every 6 hours  hydrALAZINE 50 milliGRAM(s) Oral every 8 hours  niCARdipine Infusion 5 mG/Hr IV Continuous <Continuous>    Gastrointestinal Medications  multivitamin/minerals/iron Oral Solution (CENTRUM) 15 milliLiter(s) Oral daily  naloxegol 12.5 milliGRAM(s) Oral daily  pantoprazole  Injectable 40 milliGRAM(s) IV Push daily  polyethylene glycol 3350 17 Gram(s) Oral two times a day  senna Syrup 10 milliLiter(s) Oral two times a day    Genitourinary Medications    Hematologic/Oncologic Medications  argatroban Infusion 1.1 MICROgram(s)/kG/Min IV Continuous <Continuous>    Antimicrobial/Immunologic Medications  piperacillin/tazobactam IVPB.. 4.5 Gram(s) IV Intermittent every 8 hours    Endocrine/Metabolic Medications  dextrose 50% Injectable 25 Gram(s) IV Push once  dextrose 50% Injectable 12.5 Gram(s) IV Push once  dextrose 50% Injectable 25 Gram(s) IV Push once  glucagon  Injectable 1 milliGRAM(s) IntraMuscular once  insulin lispro (ADMELOG) corrective regimen sliding scale   SubCutaneous every 6 hours    Topical/Other Medications  chlorhexidine 0.12% Liquid 15 milliLiter(s) Oral Mucosa every 12 hours  chlorhexidine 2% Cloths 1 Application(s) Topical <User Schedule>  clotrimazole 1% Cream 1 Application(s) Topical two times a day  petrolatum Ophthalmic Ointment 1 Application(s) Both EYES every 12 hours    --------------------------------------------------------------------------------------    VITAL SIGNS:    T(C): 36.7 (03-06-25 @ 08:00), Max: 36.7 (03-06-25 @ 08:00)  HR: 90 (03-06-25 @ 09:21) (54 - 133)  BP: --  RR: 14 (03-06-25 @ 09:00) (12 - 28)  SpO2: 99% (03-06-25 @ 09:21) (94% - 100%)    --------------------------------------------------------------------------------------  PHYSICAL EXAM:  General: sedated  Respiratory: intubated   Extremities: WWP, b/l LE edema, b/l plantar blister noted without evidence of infection, +ds DP/PT b/l, groins soft, R fem and RIJ ECMO cannula c/d/i  --------------------------------------------------------------------------------------  LABS                        11.0   13.67 )-----------( 97       ( 06 Mar 2025 04:00 )             39.5   03-06    145  |  107  |  35[H]  ----------------------------<  172[H]  3.8   |  29  |  0.76    Ca    8.5      06 Mar 2025 04:00  Phos  3.4     03-06  Mg     2.00     03-06    TPro  6.0  /  Alb  2.8[L]  /  TBili  1.1  /  DBili  x   /  AST  37  /  ALT  99[H]  /  AlkPhos  52  03-06    --------------------------------------------------------------------------------------    INS AND OUTS:    03-05-25 @ 07:01  -  03-06-25 @ 07:00  --------------------------------------------------------  IN: 5907.7 mL / OUT: 7815 mL / NET: -1907.3 mL    03-06-25 @ 07:01  -  03-06-25 @ 09:32  --------------------------------------------------------  IN: 415.8 mL / OUT: 555 mL / NET: -139.2 mL        --------------------------------------------------------------------------------------

## 2025-03-06 NOTE — PROGRESS NOTE ADULT - SUBJECTIVE AND OBJECTIVE BOX
THORACIC SURGERY PRE-OP NOTE      Procedure: Flexible Bronchoscopy, Tracheostomy, EGD PEG Placement      SUBJECTIVE:  Patient remains sedated, not paralyzed  On VV ECMO and Argatroban Drip       OBJECTIVE:  VITALS:  T(F): 98.1 (03-06-25 @ 16:00), Max: 98.2 (03-06-25 @ 12:00)  HR: 89 (03-06-25 @ 16:00) (54 - 133)  BP: --  RR: 14 (03-06-25 @ 16:00) (12 - 22)  SpO2: 100% (03-06-25 @ 16:00) (95% - 100%)        03-05 @ 07:01  -  03-06 @ 07:00  --------------------------------------------------------  IN:    Argatroban: 381.6 mL    Dexmedetomidine: 2167.2 mL    Free Water: 750 mL    HYRDOmorphone: 12.6 mL    IV PiggyBack: 600 mL    NiCARdipine: 75 mL    NiCARdipine: 272.5 mL    Peptamen Intense VHP: 840 mL    Propofol: 808.8 mL  Total IN: 5907.7 mL    OUT:    Indwelling Catheter - Urethral (mL): 7415 mL    Stool (mL): 400 mL  Total OUT: 7815 mL    Total NET: -1907.3 mL      03-06 @ 07:01  -  03-06 @ 16:49  --------------------------------------------------------  IN:    Albumin 5%  - 250 mL: 250 mL    Argatroban: 159 mL    Dexmedetomidine: 903 mL    Free Water: 350 mL    HYRDOmorphone: 1.5 mL    HYRDOmorphone: 5 mL    IV PiggyBack: 120 mL    NiCARdipine: 387.9 mL    Peptamen Intense VHP: 335 mL    Propofol: 289 mL  Total IN: 2800.4 mL    OUT:    Indwelling Catheter - Urethral (mL): 2690 mL  Total OUT: 2690 mL    Total NET: 110.4 mL      LABS:                        10.6   12.58 )-----------( 97       ( 06 Mar 2025 16:10 )             37.3     03-06    146[H]  |  108[H]  |  37[H]  ----------------------------<  173[H]  4.2   |  30  |  0.72    Ca    8.3[L]      06 Mar 2025 16:10  Phos  2.6     03-06  Mg     1.90     03-06    TPro  6.0  /  Alb  2.9[L]  /  TBili  1.1  /  DBili  x   /  AST  38  /  ALT  86[H]  /  AlkPhos  53  03-06        PHYSICAL EXAM:   General: No acute distress, intubated & sedated  Skin: No jaundice, no icterus  Respiratory: clear to auscultation bilaterally, no wheezes / rhonchi / rales, trachea midline, on VV ECMO  Cardiac: S1 & S2 present, rate and rhythm are regular  Abdomen: soft, nontender, nondistended, no rebound tenderness, no guarding, no palpable masses

## 2025-03-06 NOTE — PROGRESS NOTE ADULT - SUBJECTIVE AND OBJECTIVE BOX
PATIENT:  SAL CHILDERS  [id number]    CHIEF COMPLAINT:  Patient is a 39y old  Male who presents with a chief complaint of sob (02 Mar 2025 06:19)      INTERVAL HISTORY/OVERNIGHT EVENTS:  - no acute events    MEDICATIONS:  MEDICATIONS  (STANDING):  albuterol/ipratropium for Nebulization 3 milliLiter(s) Nebulizer every 6 hours  argatroban Infusion 1.1 MICROgram(s)/kG/Min (15.9 mL/Hr) IV Continuous <Continuous>  chlorhexidine 0.12% Liquid 15 milliLiter(s) Oral Mucosa every 12 hours  chlorhexidine 2% Cloths 1 Application(s) Topical <User Schedule>  clotrimazole 1% Cream 1 Application(s) Topical two times a day  dexAMETHasone  IVPB 10 milliGRAM(s) IV Intermittent daily  dexMEDEtomidine Infusion 1 MICROgram(s)/kG/Hr (60.2 mL/Hr) IV Continuous <Continuous>  dextrose 50% Injectable 25 Gram(s) IV Push once  dextrose 50% Injectable 12.5 Gram(s) IV Push once  dextrose 50% Injectable 25 Gram(s) IV Push once  furosemide   Injectable 40 milliGRAM(s) IV Push every 6 hours  glucagon  Injectable 1 milliGRAM(s) IntraMuscular once  hydrALAZINE 50 milliGRAM(s) Oral every 8 hours  HYDROmorphone Infusion. 1 mG/Hr (1 mL/Hr) IV Continuous <Continuous>  insulin lispro (ADMELOG) corrective regimen sliding scale   SubCutaneous every 6 hours  multivitamin/minerals/iron Oral Solution (CENTRUM) 15 milliLiter(s) Oral daily  naloxegol 12.5 milliGRAM(s) Oral daily  niCARdipine Infusion 5 mG/Hr (25 mL/Hr) IV Continuous <Continuous>  pantoprazole  Injectable 40 milliGRAM(s) IV Push daily  petrolatum Ophthalmic Ointment 1 Application(s) Both EYES every 12 hours  piperacillin/tazobactam IVPB.. 4.5 Gram(s) IV Intermittent every 8 hours  polyethylene glycol 3350 17 Gram(s) Oral two times a day  propofol Infusion 25 MICROgram(s)/kG/Min (36.1 mL/Hr) IV Continuous <Continuous>  QUEtiapine 25 milliGRAM(s) Oral every 12 hours  senna Syrup 10 milliLiter(s) Oral two times a day  sodium chloride 3%  Inhalation 4 milliLiter(s) Inhalation every 6 hours    MEDICATIONS  (PRN):      ALLERGIES:  Allergies    No Known Allergies    Intolerances        OBJECTIVE:  ICU Vital Signs Last 24 Hrs  T(C): 35.8 (06 Mar 2025 04:00), Max: 36 (05 Mar 2025 20:00)  T(F): 96.4 (06 Mar 2025 04:00), Max: 96.8 (05 Mar 2025 20:00)  HR: 133 (06 Mar 2025 07:39) (54 - 133)  BP: --  BP(mean): --  ABP: 126/62 (06 Mar 2025 07:00) (125/61 - 177/93)  ABP(mean): 80 (06 Mar 2025 07:00) (77 - 118)  RR: 17 (06 Mar 2025 07:00) (12 - 28)  SpO2: 97% (06 Mar 2025 07:39) (94% - 100%)    O2 Parameters below as of 06 Mar 2025 06:00  Patient On (Oxygen Delivery Method): ventilator    O2 Concentration (%): 40      Mode: AC/ CMV (Assist Control/ Continuous Mandatory Ventilation)  RR (machine): 14  FiO2: 40  PEEP: 20  ITime: 1  MAP: 25  PC: 12  PIP: 33    Adult Advanced Hemodynamics Last 24 Hrs  CVP(mm Hg): --  CVP(cm H2O): --  CO: --  CI: --  PA: --  PA(mean): --  PCWP: --  SVR: --  SVRI: --  PVR: --  PVRI: --  CAPILLARY BLOOD GLUCOSE      POCT Blood Glucose.: 182 mg/dL (06 Mar 2025 04:52)  POCT Blood Glucose.: 155 mg/dL (05 Mar 2025 23:12)  POCT Blood Glucose.: 226 mg/dL (05 Mar 2025 17:06)  POCT Blood Glucose.: 191 mg/dL (05 Mar 2025 12:00)    CAPILLARY BLOOD GLUCOSE      POCT Blood Glucose.: 182 mg/dL (06 Mar 2025 04:52)    I&O's Summary    05 Mar 2025 07:01  -  06 Mar 2025 07:00  --------------------------------------------------------  IN: 5907.7 mL / OUT: 7815 mL / NET: -1907.3 mL      Daily     Daily Weight in k.5 (06 Mar 2025 04:00)    PHYSICAL EXAMINATION:  General: no distress  ENT: atraumatic  Neuro: grossly nonfocal, following commands appropriately both sides, EASLEY to commands, answered yes/no appropriately  Resp: on vent, scattered mild diminishment/mechanical  CV: rate 90s  Abdominal: soft, nonprotuberant  Extremities: pitting edema present    LABS:  ABG - ( 06 Mar 2025 04:00 )  pH, Arterial: 7.36  pH, Blood: x     /  pCO2: 62    /  pO2: 145   / HCO3: 35    / Base Excess: 7.8   /  SaO2: 98.9                                    11.0   13.67 )-----------( 97       ( 06 Mar 2025 04:00 )             39.5     03-06    145  |  107  |  35[H]  ----------------------------<  172[H]  3.8   |  29  |  0.76    Ca    8.5      06 Mar 2025 04:00  Phos  3.4     03-06  Mg     2.00     03-06    TPro  6.0  /  Alb  2.8[L]  /  TBili  1.1  /  DBili  x   /  AST  37  /  ALT  99[H]  /  AlkPhos  52  03-06    LIVER FUNCTIONS - ( 06 Mar 2025 04:00 )  Alb: 2.8 g/dL / Pro: 6.0 g/dL / ALK PHOS: 52 U/L / ALT: 99 U/L / AST: 37 U/L / GGT: x           PT/INR - ( 06 Mar 2025 04:00 )   PT: 25.2 sec;   INR: 2.19 ratio         PTT - ( 06 Mar 2025 04:00 )  PTT:65.9 sec        Urinalysis Basic - ( 06 Mar 2025 04:00 )    Color: x / Appearance: x / SG: x / pH: x  Gluc: 172 mg/dL / Ketone: x  / Bili: x / Urobili: x   Blood: x / Protein: x / Nitrite: x   Leuk Esterase: x / RBC: x / WBC x   Sq Epi: x / Non Sq Epi: x / Bacteria: x

## 2025-03-07 ENCOUNTER — APPOINTMENT (OUTPATIENT)
Dept: THORACIC SURGERY | Facility: HOSPITAL | Age: 40
End: 2025-03-07

## 2025-03-07 LAB
ALBUMIN SERPL ELPH-MCNC: 2.9 G/DL — LOW (ref 3.3–5)
ALBUMIN SERPL ELPH-MCNC: 3 G/DL — LOW (ref 3.3–5)
ALBUMIN SERPL ELPH-MCNC: 3.2 G/DL — LOW (ref 3.3–5)
ALBUMIN SERPL ELPH-MCNC: 3.2 G/DL — LOW (ref 3.3–5)
ALP SERPL-CCNC: 49 U/L — SIGNIFICANT CHANGE UP (ref 40–120)
ALP SERPL-CCNC: 50 U/L — SIGNIFICANT CHANGE UP (ref 40–120)
ALP SERPL-CCNC: 51 U/L — SIGNIFICANT CHANGE UP (ref 40–120)
ALP SERPL-CCNC: 52 U/L — SIGNIFICANT CHANGE UP (ref 40–120)
ALT FLD-CCNC: 73 U/L — HIGH (ref 4–41)
ALT FLD-CCNC: 73 U/L — HIGH (ref 4–41)
ALT FLD-CCNC: 74 U/L — HIGH (ref 4–41)
ALT FLD-CCNC: 74 U/L — HIGH (ref 4–41)
ANION GAP SERPL CALC-SCNC: 11 MMOL/L — SIGNIFICANT CHANGE UP (ref 7–14)
ANION GAP SERPL CALC-SCNC: 8 MMOL/L — SIGNIFICANT CHANGE UP (ref 7–14)
APTT BLD: 29.5 SEC — SIGNIFICANT CHANGE UP (ref 24.5–35.6)
APTT BLD: 31.5 SEC — SIGNIFICANT CHANGE UP (ref 24.5–35.6)
APTT BLD: 33.6 SEC — SIGNIFICANT CHANGE UP (ref 24.5–35.6)
AST SERPL-CCNC: 32 U/L — SIGNIFICANT CHANGE UP (ref 4–40)
AST SERPL-CCNC: 33 U/L — SIGNIFICANT CHANGE UP (ref 4–40)
AST SERPL-CCNC: 33 U/L — SIGNIFICANT CHANGE UP (ref 4–40)
AST SERPL-CCNC: 34 U/L — SIGNIFICANT CHANGE UP (ref 4–40)
BASOPHILS # BLD AUTO: 0.01 K/UL — SIGNIFICANT CHANGE UP (ref 0–0.2)
BASOPHILS NFR BLD AUTO: 0.1 % — SIGNIFICANT CHANGE UP (ref 0–2)
BILIRUB SERPL-MCNC: 1.1 MG/DL — SIGNIFICANT CHANGE UP (ref 0.2–1.2)
BILIRUB SERPL-MCNC: 1.2 MG/DL — SIGNIFICANT CHANGE UP (ref 0.2–1.2)
BILIRUB SERPL-MCNC: 1.3 MG/DL — HIGH (ref 0.2–1.2)
BILIRUB SERPL-MCNC: 1.4 MG/DL — HIGH (ref 0.2–1.2)
BLOOD GAS ARTERIAL - LYTES,HGB,ICA,LACT RESULT: SIGNIFICANT CHANGE UP
BLOOD GAS ECMO POST MEMBRANE - ARTERIAL RESULT: SIGNIFICANT CHANGE UP
BLOOD GAS ECMO POST MEMBRANE - ARTERIAL RESULT: SIGNIFICANT CHANGE UP
BLOOD GAS ECMO PRE MEMBRANE - VENOUS RESULT: SIGNIFICANT CHANGE UP
BLOOD GAS ECMO PRE MEMBRANE - VENOUS RESULT: SIGNIFICANT CHANGE UP
BUN SERPL-MCNC: 34 MG/DL — HIGH (ref 7–23)
BUN SERPL-MCNC: 35 MG/DL — HIGH (ref 7–23)
CA-I BLD-SCNC: 1.15 MMOL/L — SIGNIFICANT CHANGE UP (ref 1.15–1.29)
CALCIUM SERPL-MCNC: 8.3 MG/DL — LOW (ref 8.4–10.5)
CALCIUM SERPL-MCNC: 8.4 MG/DL — SIGNIFICANT CHANGE UP (ref 8.4–10.5)
CALCIUM SERPL-MCNC: 8.6 MG/DL — SIGNIFICANT CHANGE UP (ref 8.4–10.5)
CALCIUM SERPL-MCNC: 8.7 MG/DL — SIGNIFICANT CHANGE UP (ref 8.4–10.5)
CHLORIDE SERPL-SCNC: 104 MMOL/L — SIGNIFICANT CHANGE UP (ref 98–107)
CHLORIDE SERPL-SCNC: 105 MMOL/L — SIGNIFICANT CHANGE UP (ref 98–107)
CHLORIDE SERPL-SCNC: 106 MMOL/L — SIGNIFICANT CHANGE UP (ref 98–107)
CHLORIDE SERPL-SCNC: 108 MMOL/L — HIGH (ref 98–107)
CO2 SERPL-SCNC: 29 MMOL/L — SIGNIFICANT CHANGE UP (ref 22–31)
CO2 SERPL-SCNC: 29 MMOL/L — SIGNIFICANT CHANGE UP (ref 22–31)
CO2 SERPL-SCNC: 31 MMOL/L — SIGNIFICANT CHANGE UP (ref 22–31)
CO2 SERPL-SCNC: 31 MMOL/L — SIGNIFICANT CHANGE UP (ref 22–31)
CREAT SERPL-MCNC: 0.76 MG/DL — SIGNIFICANT CHANGE UP (ref 0.5–1.3)
CREAT SERPL-MCNC: 0.78 MG/DL — SIGNIFICANT CHANGE UP (ref 0.5–1.3)
CREAT SERPL-MCNC: 0.78 MG/DL — SIGNIFICANT CHANGE UP (ref 0.5–1.3)
CREAT SERPL-MCNC: 0.8 MG/DL — SIGNIFICANT CHANGE UP (ref 0.5–1.3)
CULTURE RESULTS: NO GROWTH — SIGNIFICANT CHANGE UP
EGFR: 117 ML/MIN/1.73M2 — SIGNIFICANT CHANGE UP
EGFR: 117 ML/MIN/1.73M2 — SIGNIFICANT CHANGE UP
EGFR: 118 ML/MIN/1.73M2 — SIGNIFICANT CHANGE UP
EGFR: 119 ML/MIN/1.73M2 — SIGNIFICANT CHANGE UP
EGFR: 119 ML/MIN/1.73M2 — SIGNIFICANT CHANGE UP
EOSINOPHIL # BLD AUTO: 0 K/UL — SIGNIFICANT CHANGE UP (ref 0–0.5)
EOSINOPHIL # BLD AUTO: 0 K/UL — SIGNIFICANT CHANGE UP (ref 0–0.5)
EOSINOPHIL # BLD AUTO: 0.02 K/UL — SIGNIFICANT CHANGE UP (ref 0–0.5)
EOSINOPHIL NFR BLD AUTO: 0 % — SIGNIFICANT CHANGE UP (ref 0–6)
EOSINOPHIL NFR BLD AUTO: 0 % — SIGNIFICANT CHANGE UP (ref 0–6)
EOSINOPHIL NFR BLD AUTO: 0.2 % — SIGNIFICANT CHANGE UP (ref 0–6)
GLUCOSE BLDC GLUCOMTR-MCNC: 142 MG/DL — HIGH (ref 70–99)
GLUCOSE BLDC GLUCOMTR-MCNC: 146 MG/DL — HIGH (ref 70–99)
GLUCOSE BLDC GLUCOMTR-MCNC: 176 MG/DL — HIGH (ref 70–99)
GLUCOSE BLDC GLUCOMTR-MCNC: 187 MG/DL — HIGH (ref 70–99)
GLUCOSE SERPL-MCNC: 149 MG/DL — HIGH (ref 70–99)
GLUCOSE SERPL-MCNC: 155 MG/DL — HIGH (ref 70–99)
GLUCOSE SERPL-MCNC: 164 MG/DL — HIGH (ref 70–99)
GLUCOSE SERPL-MCNC: 187 MG/DL — HIGH (ref 70–99)
HAPTOGLOB SERPL-MCNC: <20 MG/DL — LOW (ref 34–200)
HCT VFR BLD CALC: 35.1 % — LOW (ref 39–50)
HCT VFR BLD CALC: 36.2 % — LOW (ref 39–50)
HCT VFR BLD CALC: 36.4 % — LOW (ref 39–50)
HGB BLD-MCNC: 10.1 G/DL — LOW (ref 13–17)
HGB BLD-MCNC: 10.5 G/DL — LOW (ref 13–17)
HGB BLD-MCNC: 10.5 G/DL — LOW (ref 13–17)
IANC: 8.6 K/UL — HIGH (ref 1.8–7.4)
IANC: 9.39 K/UL — HIGH (ref 1.8–7.4)
IANC: 9.6 K/UL — HIGH (ref 1.8–7.4)
IMM GRANULOCYTES NFR BLD AUTO: 0.6 % — SIGNIFICANT CHANGE UP (ref 0–0.9)
IMM GRANULOCYTES NFR BLD AUTO: 0.6 % — SIGNIFICANT CHANGE UP (ref 0–0.9)
IMM GRANULOCYTES NFR BLD AUTO: 0.8 % — SIGNIFICANT CHANGE UP (ref 0–0.9)
INR BLD: 1.21 RATIO — HIGH (ref 0.85–1.16)
INR BLD: 1.24 RATIO — HIGH (ref 0.85–1.16)
INR BLD: 1.25 RATIO — HIGH (ref 0.85–1.16)
LDH SERPL L TO P-CCNC: 496 U/L — HIGH (ref 135–225)
LYMPHOCYTES # BLD AUTO: 0.37 K/UL — LOW (ref 1–3.3)
LYMPHOCYTES # BLD AUTO: 0.39 K/UL — LOW (ref 1–3.3)
LYMPHOCYTES # BLD AUTO: 0.96 K/UL — LOW (ref 1–3.3)
LYMPHOCYTES # BLD AUTO: 3.5 % — LOW (ref 13–44)
LYMPHOCYTES # BLD AUTO: 3.6 % — LOW (ref 13–44)
LYMPHOCYTES # BLD AUTO: 9 % — LOW (ref 13–44)
MAGNESIUM SERPL-MCNC: 1.9 MG/DL — SIGNIFICANT CHANGE UP (ref 1.6–2.6)
MAGNESIUM SERPL-MCNC: 2 MG/DL — SIGNIFICANT CHANGE UP (ref 1.6–2.6)
MAGNESIUM SERPL-MCNC: 2.1 MG/DL — SIGNIFICANT CHANGE UP (ref 1.6–2.6)
MAGNESIUM SERPL-MCNC: 2.1 MG/DL — SIGNIFICANT CHANGE UP (ref 1.6–2.6)
MCHC RBC-ENTMCNC: 23.2 PG — LOW (ref 27–34)
MCHC RBC-ENTMCNC: 23.2 PG — LOW (ref 27–34)
MCHC RBC-ENTMCNC: 23.5 PG — LOW (ref 27–34)
MCHC RBC-ENTMCNC: 28.8 G/DL — LOW (ref 32–36)
MCHC RBC-ENTMCNC: 28.8 G/DL — LOW (ref 32–36)
MCHC RBC-ENTMCNC: 29 G/DL — LOW (ref 32–36)
MCV RBC AUTO: 80.1 FL — SIGNIFICANT CHANGE UP (ref 80–100)
MCV RBC AUTO: 80.5 FL — SIGNIFICANT CHANGE UP (ref 80–100)
MCV RBC AUTO: 81.6 FL — SIGNIFICANT CHANGE UP (ref 80–100)
MONOCYTES # BLD AUTO: 0.47 K/UL — SIGNIFICANT CHANGE UP (ref 0–0.9)
MONOCYTES # BLD AUTO: 0.91 K/UL — HIGH (ref 0–0.9)
MONOCYTES # BLD AUTO: 1.04 K/UL — HIGH (ref 0–0.9)
MONOCYTES NFR BLD AUTO: 4.6 % — SIGNIFICANT CHANGE UP (ref 2–14)
MONOCYTES NFR BLD AUTO: 8.3 % — SIGNIFICANT CHANGE UP (ref 2–14)
MONOCYTES NFR BLD AUTO: 9.7 % — SIGNIFICANT CHANGE UP (ref 2–14)
NEUTROPHILS # BLD AUTO: 8.6 K/UL — HIGH (ref 1.8–7.4)
NEUTROPHILS # BLD AUTO: 9.39 K/UL — HIGH (ref 1.8–7.4)
NEUTROPHILS # BLD AUTO: 9.6 K/UL — HIGH (ref 1.8–7.4)
NEUTROPHILS NFR BLD AUTO: 80.4 % — HIGH (ref 43–77)
NEUTROPHILS NFR BLD AUTO: 87.3 % — HIGH (ref 43–77)
NEUTROPHILS NFR BLD AUTO: 91.1 % — HIGH (ref 43–77)
NRBC # BLD AUTO: 0 K/UL — SIGNIFICANT CHANGE UP (ref 0–0)
NRBC # FLD: 0 K/UL — SIGNIFICANT CHANGE UP (ref 0–0)
NRBC BLD AUTO-RTO: 0 /100 WBCS — SIGNIFICANT CHANGE UP (ref 0–0)
PHOSPHATE SERPL-MCNC: 3.9 MG/DL — SIGNIFICANT CHANGE UP (ref 2.5–4.5)
PHOSPHATE SERPL-MCNC: 4.6 MG/DL — HIGH (ref 2.5–4.5)
PHOSPHATE SERPL-MCNC: 4.6 MG/DL — HIGH (ref 2.5–4.5)
PHOSPHATE SERPL-MCNC: 4.9 MG/DL — HIGH (ref 2.5–4.5)
PLATELET # BLD AUTO: 78 K/UL — LOW (ref 150–400)
PLATELET # BLD AUTO: 79 K/UL — LOW (ref 150–400)
PLATELET # BLD AUTO: 84 K/UL — LOW (ref 150–400)
POTASSIUM SERPL-MCNC: 3.7 MMOL/L — SIGNIFICANT CHANGE UP (ref 3.5–5.3)
POTASSIUM SERPL-MCNC: 4.1 MMOL/L — SIGNIFICANT CHANGE UP (ref 3.5–5.3)
POTASSIUM SERPL-SCNC: 3.7 MMOL/L — SIGNIFICANT CHANGE UP (ref 3.5–5.3)
POTASSIUM SERPL-SCNC: 4.1 MMOL/L — SIGNIFICANT CHANGE UP (ref 3.5–5.3)
PROT SERPL-MCNC: 5.9 G/DL — LOW (ref 6–8.3)
PROT SERPL-MCNC: 6.1 G/DL — SIGNIFICANT CHANGE UP (ref 6–8.3)
PROT SERPL-MCNC: 6.3 G/DL — SIGNIFICANT CHANGE UP (ref 6–8.3)
PROT SERPL-MCNC: 6.4 G/DL — SIGNIFICANT CHANGE UP (ref 6–8.3)
PROTHROM AB SERPL-ACNC: 14 SEC — HIGH (ref 9.9–13.4)
PROTHROM AB SERPL-ACNC: 14.7 SEC — HIGH (ref 9.9–13.4)
PROTHROM AB SERPL-ACNC: 14.9 SEC — HIGH (ref 9.9–13.4)
RBC # BLD: 4.36 M/UL — SIGNIFICANT CHANGE UP (ref 4.2–5.8)
RBC # BLD: 4.46 M/UL — SIGNIFICANT CHANGE UP (ref 4.2–5.8)
RBC # BLD: 4.52 M/UL — SIGNIFICANT CHANGE UP (ref 4.2–5.8)
RBC # FLD: 21 % — HIGH (ref 10.3–14.5)
RBC # FLD: 21 % — HIGH (ref 10.3–14.5)
RBC # FLD: 21.3 % — HIGH (ref 10.3–14.5)
SODIUM SERPL-SCNC: 141 MMOL/L — SIGNIFICANT CHANGE UP (ref 135–145)
SODIUM SERPL-SCNC: 142 MMOL/L — SIGNIFICANT CHANGE UP (ref 135–145)
SODIUM SERPL-SCNC: 147 MMOL/L — HIGH (ref 135–145)
SODIUM SERPL-SCNC: 148 MMOL/L — HIGH (ref 135–145)
SPECIMEN SOURCE: SIGNIFICANT CHANGE UP
TRIGL SERPL-MCNC: 187 MG/DL — HIGH
UNFRACTIONATED HEPARIN INTERPRETATION: SIGNIFICANT CHANGE UP
UNFRACTIONATED HEPARIN RESULT: NEGATIVE — SIGNIFICANT CHANGE UP
UNFRACTIONATED HEPARIN-HIGH DOSE: 1 % — SIGNIFICANT CHANGE UP
UNFRACTIONATED HEPARIN-LOW DOSE: 0 % — SIGNIFICANT CHANGE UP
VIRUS SPEC CULT: SIGNIFICANT CHANGE UP
WBC # BLD: 10.3 K/UL — SIGNIFICANT CHANGE UP (ref 3.8–10.5)
WBC # BLD: 10.69 K/UL — HIGH (ref 3.8–10.5)
WBC # BLD: 11 K/UL — HIGH (ref 3.8–10.5)
WBC # FLD AUTO: 10.3 K/UL — SIGNIFICANT CHANGE UP (ref 3.8–10.5)
WBC # FLD AUTO: 10.69 K/UL — HIGH (ref 3.8–10.5)
WBC # FLD AUTO: 11 K/UL — HIGH (ref 3.8–10.5)

## 2025-03-07 PROCEDURE — 33948 ECMO/ECLS DAILY MGMT-VENOUS: CPT | Mod: GC

## 2025-03-07 PROCEDURE — 71045 X-RAY EXAM CHEST 1 VIEW: CPT | Mod: 26,76

## 2025-03-07 PROCEDURE — 76604 US EXAM CHEST: CPT | Mod: 26,GC

## 2025-03-07 PROCEDURE — 43246 EGD PLACE GASTROSTOMY TUBE: CPT

## 2025-03-07 PROCEDURE — 99292 CRITICAL CARE ADDL 30 MIN: CPT | Mod: 25

## 2025-03-07 PROCEDURE — 93308 TTE F-UP OR LMTD: CPT | Mod: 26,GC

## 2025-03-07 PROCEDURE — 33966 ECMO/ECLS RMVL PRPH CANNULA: CPT | Mod: AS

## 2025-03-07 PROCEDURE — 31600 PLANNED TRACHEOSTOMY: CPT

## 2025-03-07 PROCEDURE — 99291 CRITICAL CARE FIRST HOUR: CPT | Mod: 25

## 2025-03-07 DEVICE — FEEDING TUBE PEG KIT MIC 20FR PULL: Type: IMPLANTABLE DEVICE | Status: FUNCTIONAL

## 2025-03-07 DEVICE — TUBE TRACH 6.0 XLT CUFF PROXIMAL: Type: IMPLANTABLE DEVICE | Status: FUNCTIONAL

## 2025-03-07 DEVICE — SET PERC TRACH NO TUBE BLU RHINO: Type: IMPLANTABLE DEVICE | Status: FUNCTIONAL

## 2025-03-07 RX ORDER — ETOMIDATE 2 MG/ML
20 SYRINGE (ML) INTRAVENOUS ONCE
Refills: 0 | Status: COMPLETED | OUTPATIENT
Start: 2025-03-07 | End: 2025-03-07

## 2025-03-07 RX ORDER — OLANZAPINE 10 MG/1
5 TABLET ORAL EVERY 8 HOURS
Refills: 0 | Status: COMPLETED | OUTPATIENT
Start: 2025-03-07 | End: 2025-03-08

## 2025-03-07 RX ORDER — FUROSEMIDE 10 MG/ML
15 INJECTION INTRAMUSCULAR; INTRAVENOUS
Qty: 500 | Refills: 0 | Status: DISCONTINUED | OUTPATIENT
Start: 2025-03-07 | End: 2025-03-08

## 2025-03-07 RX ORDER — FENTANYL CITRATE-0.9 % NACL/PF 100MCG/2ML
100 SYRINGE (ML) INTRAVENOUS ONCE
Refills: 0 | Status: DISCONTINUED | OUTPATIENT
Start: 2025-03-07 | End: 2025-03-07

## 2025-03-07 RX ORDER — OLANZAPINE 10 MG/1
10 TABLET ORAL EVERY 8 HOURS
Refills: 0 | Status: DISCONTINUED | OUTPATIENT
Start: 2025-03-07 | End: 2025-03-07

## 2025-03-07 RX ORDER — QUETIAPINE FUMARATE 25 MG/1
25 TABLET ORAL ONCE
Refills: 0 | Status: DISCONTINUED | OUTPATIENT
Start: 2025-03-07 | End: 2025-03-07

## 2025-03-07 RX ORDER — MAGNESIUM SULFATE 500 MG/ML
1 SYRINGE (ML) INJECTION ONCE
Refills: 0 | Status: COMPLETED | OUTPATIENT
Start: 2025-03-07 | End: 2025-03-07

## 2025-03-07 RX ORDER — QUETIAPINE FUMARATE 25 MG/1
50 TABLET ORAL
Refills: 0 | Status: DISCONTINUED | OUTPATIENT
Start: 2025-03-07 | End: 2025-03-08

## 2025-03-07 RX ORDER — ENALAPRIL MALEATE 20 MG
1.25 TABLET ORAL EVERY 6 HOURS
Refills: 0 | Status: DISCONTINUED | OUTPATIENT
Start: 2025-03-07 | End: 2025-03-08

## 2025-03-07 RX ORDER — FUROSEMIDE 10 MG/ML
2.5 INJECTION INTRAMUSCULAR; INTRAVENOUS
Qty: 500 | Refills: 0 | Status: DISCONTINUED | OUTPATIENT
Start: 2025-03-07 | End: 2025-03-07

## 2025-03-07 RX ORDER — FUROSEMIDE 10 MG/ML
10 INJECTION INTRAMUSCULAR; INTRAVENOUS
Qty: 500 | Refills: 0 | Status: DISCONTINUED | OUTPATIENT
Start: 2025-03-07 | End: 2025-03-07

## 2025-03-07 RX ORDER — HYDROMORPHONE/SOD CHLOR,ISO/PF 2 MG/10 ML
1 SYRINGE (ML) INJECTION ONCE
Refills: 0 | Status: DISCONTINUED | OUTPATIENT
Start: 2025-03-07 | End: 2025-03-07

## 2025-03-07 RX ORDER — ALBUMIN (HUMAN) 12.5 G/50ML
250 INJECTION, SOLUTION INTRAVENOUS ONCE
Refills: 0 | Status: COMPLETED | OUTPATIENT
Start: 2025-03-07 | End: 2025-03-07

## 2025-03-07 RX ADMIN — Medication 1 APPLICATION(S): at 05:28

## 2025-03-07 RX ADMIN — IPRATROPIUM BROMIDE AND ALBUTEROL SULFATE 3 MILLILITER(S): .5; 2.5 SOLUTION RESPIRATORY (INHALATION) at 21:25

## 2025-03-07 RX ADMIN — IPRATROPIUM BROMIDE AND ALBUTEROL SULFATE 3 MILLILITER(S): .5; 2.5 SOLUTION RESPIRATORY (INHALATION) at 14:43

## 2025-03-07 RX ADMIN — Medication 100 GRAM(S): at 07:34

## 2025-03-07 RX ADMIN — DEXAMETHASONE 102 MILLIGRAM(S): 0.5 TABLET ORAL at 05:29

## 2025-03-07 RX ADMIN — DEXMEDETOMIDINE HYDROCHLORIDE IN SODIUM CHLORIDE 60.2 MICROGRAM(S)/KG/HR: 4 INJECTION INTRAVENOUS at 19:17

## 2025-03-07 RX ADMIN — FUROSEMIDE 7.5 MG/HR: 10 INJECTION INTRAMUSCULAR; INTRAVENOUS at 12:20

## 2025-03-07 RX ADMIN — DEXMEDETOMIDINE HYDROCHLORIDE IN SODIUM CHLORIDE 60.2 MICROGRAM(S)/KG/HR: 4 INJECTION INTRAVENOUS at 07:34

## 2025-03-07 RX ADMIN — QUETIAPINE FUMARATE 25 MILLIGRAM(S): 25 TABLET ORAL at 07:36

## 2025-03-07 RX ADMIN — Medication 1.25 MILLIGRAM(S): at 23:48

## 2025-03-07 RX ADMIN — PROPOFOL 36.1 MICROGRAM(S)/KG/MIN: 10 INJECTION, EMULSION INTRAVENOUS at 19:16

## 2025-03-07 RX ADMIN — Medication 50 MILLIGRAM(S): at 05:28

## 2025-03-07 RX ADMIN — Medication 1 MILLIGRAM(S): at 15:06

## 2025-03-07 RX ADMIN — OLANZAPINE 5 MILLIGRAM(S): 10 TABLET ORAL at 21:31

## 2025-03-07 RX ADMIN — CLOTRIMAZOLE 1 APPLICATION(S): 1 CREAM TOPICAL at 17:20

## 2025-03-07 RX ADMIN — INSULIN LISPRO 2: 100 INJECTION, SOLUTION INTRAVENOUS; SUBCUTANEOUS at 11:19

## 2025-03-07 RX ADMIN — OLANZAPINE 5 MILLIGRAM(S): 10 TABLET ORAL at 14:19

## 2025-03-07 RX ADMIN — Medication 500000 UNIT(S): at 17:19

## 2025-03-07 RX ADMIN — CLOTRIMAZOLE 1 APPLICATION(S): 1 CREAM TOPICAL at 05:28

## 2025-03-07 RX ADMIN — Medication 25 MG/HR: at 07:35

## 2025-03-07 RX ADMIN — Medication 25 GRAM(S): at 14:20

## 2025-03-07 RX ADMIN — Medication 1 APPLICATION(S): at 05:29

## 2025-03-07 RX ADMIN — Medication 1 MG/HR: at 19:17

## 2025-03-07 RX ADMIN — PROPOFOL 36.1 MICROGRAM(S)/KG/MIN: 10 INJECTION, EMULSION INTRAVENOUS at 00:53

## 2025-03-07 RX ADMIN — Medication 1 MG/HR: at 02:14

## 2025-03-07 RX ADMIN — Medication 1.25 MILLIGRAM(S): at 17:57

## 2025-03-07 RX ADMIN — ALBUMIN (HUMAN) 125 MILLILITER(S): 12.5 INJECTION, SOLUTION INTRAVENOUS at 13:02

## 2025-03-07 RX ADMIN — Medication 100 MICROGRAM(S): at 16:44

## 2025-03-07 RX ADMIN — Medication 20 MILLIEQUIVALENT(S): at 07:34

## 2025-03-07 RX ADMIN — Medication 100 MICROGRAM(S): at 16:28

## 2025-03-07 RX ADMIN — IPRATROPIUM BROMIDE AND ALBUTEROL SULFATE 3 MILLILITER(S): .5; 2.5 SOLUTION RESPIRATORY (INHALATION) at 03:16

## 2025-03-07 RX ADMIN — Medication 25 GRAM(S): at 06:16

## 2025-03-07 RX ADMIN — Medication 1 MILLIGRAM(S): at 15:20

## 2025-03-07 RX ADMIN — PROPOFOL 36.1 MICROGRAM(S)/KG/MIN: 10 INJECTION, EMULSION INTRAVENOUS at 04:05

## 2025-03-07 RX ADMIN — PROPOFOL 36.1 MICROGRAM(S)/KG/MIN: 10 INJECTION, EMULSION INTRAVENOUS at 02:39

## 2025-03-07 RX ADMIN — Medication 1 APPLICATION(S): at 17:20

## 2025-03-07 RX ADMIN — Medication 40 MILLIGRAM(S): at 11:18

## 2025-03-07 RX ADMIN — PROPOFOL 36.1 MICROGRAM(S)/KG/MIN: 10 INJECTION, EMULSION INTRAVENOUS at 06:19

## 2025-03-07 RX ADMIN — Medication 500000 UNIT(S): at 23:40

## 2025-03-07 RX ADMIN — Medication 25 GRAM(S): at 21:31

## 2025-03-07 RX ADMIN — PROPOFOL 36.1 MICROGRAM(S)/KG/MIN: 10 INJECTION, EMULSION INTRAVENOUS at 07:34

## 2025-03-07 RX ADMIN — Medication 4 MILLILITER(S): at 21:26

## 2025-03-07 RX ADMIN — Medication 20 MILLIGRAM(S): at 16:52

## 2025-03-07 RX ADMIN — FUROSEMIDE 40 MILLIGRAM(S): 10 INJECTION INTRAMUSCULAR; INTRAVENOUS at 05:28

## 2025-03-07 RX ADMIN — FUROSEMIDE 5 MG/HR: 10 INJECTION INTRAMUSCULAR; INTRAVENOUS at 11:20

## 2025-03-07 RX ADMIN — INSULIN LISPRO 2: 100 INJECTION, SOLUTION INTRAVENOUS; SUBCUTANEOUS at 06:16

## 2025-03-07 RX ADMIN — Medication 1 MG/HR: at 07:35

## 2025-03-07 RX ADMIN — Medication 4 MILLILITER(S): at 03:16

## 2025-03-07 RX ADMIN — Medication 15 MILLILITER(S): at 17:56

## 2025-03-07 RX ADMIN — Medication 15 MILLILITER(S): at 05:28

## 2025-03-07 NOTE — CHART NOTE - NSCHARTNOTEFT_GEN_A_CORE
: Chance    INDICATION: ARDS    PROCEDURE:  [x] LIMITED ECHO  [x] LIMITED CHEST  [ ] LIMITED RETROPERITONEAL  [ ] LIMITED ABDOMINAL  [ ] LIMITED DVT  [ ] NEEDLE GUIDANCE VASCULAR  [ ] NEEDLE GUIDANCE THORACENTESIS  [ ] NEEDLE GUIDANCE PARACENTESIS  [ ] NEEDLE GUIDANCE PERICARDIOCENTESIS  [ ] OTHER    FINDINGS/INTERPRETATION: L > R b-lines anteriorly are present but the predominant pattern is still a-line. No significant dependent/basilar effusion laterally. RV is mildly enlarged but less than LV with preserved RV systolic function grossly. LV size and function is normal, without any significant septal abnormality or wall motion abnormality. IVC 2.7 cm. ECMO cannula visualized in EVC right at IVC/hepatic vein anastomosis. VTI 18-19 cm.    Images in Q[facility] : Chance    INDICATION: ARDS    PROCEDURE:  [x] LIMITED ECHO  [x] LIMITED CHEST  [ ] LIMITED RETROPERITONEAL  [ ] LIMITED ABDOMINAL  [ ] LIMITED DVT  [ ] NEEDLE GUIDANCE VASCULAR  [ ] NEEDLE GUIDANCE THORACENTESIS  [ ] NEEDLE GUIDANCE PARACENTESIS  [ ] NEEDLE GUIDANCE PERICARDIOCENTESIS  [ ] OTHER    FINDINGS/INTERPRETATION: L > R b-lines anteriorly are present but the predominant pattern is still a-line. No significant dependent/basilar effusion laterally. RV is mildly enlarged but less than LV with preserved RV systolic function grossly. LV size and function is normal, without any significant septal abnormality or wall motion abnormality. IVC 2.7 cm. ECMO cannula visualized in IVC with terminus just distal to IVC/hepatic vein anastomosis. VTI 18-19 cm.    Images in Q[facility] : Jitendra Godoy MD    INDICATION: ARDS    PROCEDURE:  [x] LIMITED ECHO  [x] LIMITED CHEST  [ ] LIMITED RETROPERITONEAL  [ ] LIMITED ABDOMINAL  [ ] LIMITED DVT  [ ] NEEDLE GUIDANCE VASCULAR  [ ] NEEDLE GUIDANCE THORACENTESIS  [ ] NEEDLE GUIDANCE PARACENTESIS  [ ] NEEDLE GUIDANCE PERICARDIOCENTESIS  [ ] OTHER    FINDINGS/INTERPRETATION: L > R b-lines anteriorly are present but the predominant pattern is still a-line. No significant dependent/basilar effusion laterally. RV is mildly enlarged but less than LV with preserved RV systolic function grossly. LV size and function is normal, without any significant septal abnormality or wall motion abnormality. IVC 2.7 cm. ECMO cannula visualized in IVC with terminus just distal to IVC/hepatic vein anastomosis. VTI 18-19 cm.    Images in Q[facility]

## 2025-03-07 NOTE — PROCEDURE NOTE - NSICDXPROCEDURE_GEN_ALL_CORE_FT
PROCEDURES:  Removal, cannula, percutaneous, age 6 years or older, for ECMO 07-Mar-2025 17:19:58  Zack Valentin  
PROCEDURES:  Bronchoscopy, initial, with therapeutic aspiration 27-Feb-2025 12:01:02  Judd Rice

## 2025-03-07 NOTE — ASU DISCHARGE PLAN (ADULT/PEDIATRIC) - NS MD DC FALL RISK RISK
For information on Fall & Injury Prevention, visit: [email or web address] OR  [email or web address] OR  https://www.cdc.gov/steadi/patient.html
70

## 2025-03-07 NOTE — PROGRESS NOTE ADULT - CRITICAL CARE ATTENDING COMMENT
Pt seen and examined. 39 year old M with medical hx as noted above now on VV ECMO support 2/2 ARDS in the setting of multifocal bacterial pneumonia, decompensated RV failure, ELLA 2/2 pre-renal ATN, transaminitis and lactic acidosis. Trach and PEG placed this am by CT surgery. ECMO circuit titrated back down to FiO2 of 21 % and sweep of 0.5. FUP ABG, if stable plan for circuit decannulation today. Remains on PCV with FiO2 of 40 % and PEEP at 22. Cont Dilaudid gtt, Precedex gtt and Propofol gtts. Cont Seroquel BID with close monitoring of QTc. Titrate down sedation as tolerated. Remains on Zosyn IV for multifocal PNA and cellulitis of abdominal wall. Cxs including BAL Cxs so far unrevealing. Cont pulm toilet  to mobilize secretions. Ongoing hypertension, intermittently requiring a Cardene gtt. Cont  hydralazine 50 mg Q8H, trial of ARB today with close monitoring of hemodynamics. Anasarca improving, - 1.4 L L over past 24 hours, cont albumin assisted diuresis with a Lsix gtt, follow electrolytes Q6H. Argatroban gtt on hold for 24 hours post procedure, Hb stable, mild thrombocytopenia, cont to monitor hemolysis panel and CBC. Now having BMs, abdominal distension improving. B/L plantar blistering lesions improving, cont local wound care and monitor pedal pulses. Overall prognosis extremely guarded. Remains critically ill requiring multiple bedside visits and changes in therapy. Full code. Mom and family updated in detail.    Total time spent on VV ECMO management was 15 minutes, separate from time spent on critical care management, procedures, and teaching.

## 2025-03-07 NOTE — PROGRESS NOTE ADULT - SUBJECTIVE AND OBJECTIVE BOX
Interval Events:    HPI:  38 yo M w/ class III obesity, pAfib (no AC), HTN, BEA (on CPAP), history of b/l papilledema attributed to pseudotumor cerebri s/p LP in 2024 with very high opening pressure, who is transferred for VV ECMO for ARDS and severe hypoxia. Patient initially presented to [facility] on 2/24 for SOB and b/l LE edema. 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 He is a current every day smoker of Newports and marijuana, but does not drink or do other drugs. Currently lives with his mother. There is a long term partner in his life, but they are not , and they have an on/off relationship currently not very involved with each other as per the patient's mother and aunt.  At [facility] where he was getting an eval last year for shortness of breath, he had a sleep study and was diagnosed with sleep apnea, prescribed a PAP machine, which he has in his room but the mother is not sure how many nights per week he uses it. Recently, the last day or two, his mother and brother have been under the weather with URIs and the patient 2 days ago started to develop a ruynny nose, ough, some wheezing of the chest, as well as worsening shortness of breath and fevers at home. He called his aunt who told him to go get checked out and then he landed at the [facility] ED. Patient found to be reportedly hypoxemic to 70% on RA with worsening respiratory status/secretions and somnolence in the ED. Patient was intubated with difficulty (7 attempts) due to very large tongue secretions. Despite optimal vent management, patient remained hypoxemic. Unable to prone due to obesity. Patient cannulated for VV ECMO on 2/25 [facility].     [facility] labs notable for MRSA PCR -, Fluvid -, urine legionella -, sputum gram stain  with GPC and GPR    CTA chest on 2/24 negative for pulmonary embolism, notable for patchy bilateral lower lobe opacities, hepatomegaly, mild ascites, edema/induration of the abdominal pannus.    LE duplex on 2/25 negative for DVT    TTE on 2/25 showed LV EF 61%, enlarged RV with TAPSE 2.1cm, ePASP at least 49 (Patient had 10/2024 TTE with normal LV and RV with ePASP 14).    Only home med is Lasix. Not on acetazolamide for pseudotumor or on Wegovy (was pending auth) (26 Feb 2025 01:48)      REVIEW OF SYSTEMS:  [x ] Unable to assess ROS because patient is intubated/sedated               Vital Signs Last 24 Hrs  T(C): 36.7 (07 Mar 2025 04:00), Max: 36.9 (07 Mar 2025 00:00)  T(F): 98.1 (07 Mar 2025 04:00), Max: 98.4 (07 Mar 2025 00:00)  HR: 66 (07 Mar 2025 06:00) (59 - 133)  BP: --  BP(mean): --  RR: 14 (07 Mar 2025 06:00) (13 - 19)  SpO2: 99% (07 Mar 2025 06:00) (95% - 100%)          I&O's Summary    05 Mar 2025 07:01  -  06 Mar 2025 07:00  --------------------------------------------------------  IN: 5907.7 mL / OUT: 7815 mL / NET: -1907.3 mL    06 Mar 2025 07:01  -  07 Mar 2025 06:45  --------------------------------------------------------  IN: 6234.3 mL / OUT: 7215 mL / NET: -980.7 mL          ECMO SETTINGS:  Type:		[ ] Venovenous		[ ] Venoarterial  Cannulation Site(s):     Flow:  	          P1:                  Delta P:  RPM: 		  P2:                  SVO2:    Oxygenator:	Sweep:     L/min	FiO2:     ABG - ( 07 Mar 2025 06:00 )  pH: 7.40  /  pCO2: 55    /  pO2: 126   / HCO3: 34    / Base Excess: 7.8   /  SaO2: 99.3                  VENTILATOR SETTINGS:     Mode: AC/ CMV (Assist Control/ Continuous Mandatory Ventilation)  RR (machine): 14  FiO2: 40  PEEP: 20  ITime: 1  MAP: 24  PC: 12  PIP: 32      Physical Exam:   Constitutional: ill appearing, no acute distress   HEENT: + PERRLA, EOMI, no drainage or redness  Neck: supple,  No JVD, Right IJ ecmo cannula in place   Respiratory: Ventilator assisted breath Sounds diminished bilaterally to auscultation, no accessory muscle use noted  Cardiovascular: Regular rate, regular rhythm, normal S1, S2; no murmurs or rub  Gastrointestinal: Obese, soft, non distended, no hepatosplenomegaly, normal bowel sounds  Extremities: + 2 peripheral edema, no cyanosis, no clubbing   Vascular: Equal and normal pulses: 2+ peripheral pulses throughout  Neurological: sedated/intubated  Musculoskeletal: No joint swelling or deformity; no limitation of movement  Skin: warm, dry, well perfused, cellulitis to pannus area, DTI to right buttocks         LABS:                          10.3   10.92 )-----------( 94       ( 06 Mar 2025 22:32 )             35.9                          03-06    144  |  106  |  36[H]  ----------------------------<  158[H]  3.8   |  27  |  0.74    Ca    8.4      06 Mar 2025 22:32  Phos  3.4     03-06  Mg     1.90     03-06    TPro  5.8[L]  /  Alb  2.8[L]  /  TBili  1.2  /  DBili  x   /  AST  35  /  ALT  81[H]  /  AlkPhos  49  03-06      LIVER FUNCTIONS - ( 06 Mar 2025 22:32 )  Alb: 2.8 g/dL / Pro: 5.8 g/dL / ALK PHOS: 49 U/L / ALT: 81 U/L / AST: 35 U/L / GGT: x             PT/INR - ( 06 Mar 2025 22:32 )   PT: 22.9 sec;   INR: 1.99 ratio         PTT - ( 06 Mar 2025 22:32 )  PTT:57.7 sec      Culture - Fungal, Bronchial (collected 05 Mar 2025 10:42)  Source: Bronchial Bronchial Lavage  Preliminary Report (06 Mar 2025 10:22):    Testing in progress    Culture - Acid Fast - Bronchial w/Smear (collected 05 Mar 2025 10:42)  Source: Bronchial Bronchial Lavage    Culture - Bronchial (collected 05 Mar 2025 10:42)  Source: Bronchial Bronchial Lavage  Gram Stain (05 Mar 2025 20:26):    Rare polymorphonuclear leukocytes per low power field    No squamous epithelial cells per low power field    No organisms seen per oil power field  Preliminary Report (06 Mar 2025 09:03):    No growth to date          ACTIVE MEDS:    MEDICATIONS  (STANDING):  albuterol/ipratropium for Nebulization 3 milliLiter(s) Nebulizer every 6 hours  argatroban Infusion 1.1 MICROgram(s)/kG/Min (15.9 mL/Hr) IV Continuous <Continuous>  chlorhexidine 0.12% Liquid 15 milliLiter(s) Oral Mucosa every 12 hours  chlorhexidine 2% Cloths 1 Application(s) Topical <User Schedule>  clotrimazole 1% Cream 1 Application(s) Topical two times a day  dexAMETHasone  IVPB 10 milliGRAM(s) IV Intermittent daily  dexMEDEtomidine Infusion 1 MICROgram(s)/kG/Hr (60.2 mL/Hr) IV Continuous <Continuous>  dextrose 50% Injectable 25 Gram(s) IV Push once  dextrose 50% Injectable 12.5 Gram(s) IV Push once  dextrose 50% Injectable 25 Gram(s) IV Push once  furosemide   Injectable 40 milliGRAM(s) IV Push every 6 hours  glucagon  Injectable 1 milliGRAM(s) IntraMuscular once  hydrALAZINE 50 milliGRAM(s) Oral every 8 hours  HYDROmorphone Infusion. 1 mG/Hr (1 mL/Hr) IV Continuous <Continuous>  insulin lispro (ADMELOG) corrective regimen sliding scale   SubCutaneous every 6 hours  multivitamin/minerals/iron Oral Solution (CENTRUM) 15 milliLiter(s) Oral daily  naloxegol 12.5 milliGRAM(s) Oral daily  niCARdipine Infusion 5 mG/Hr (25 mL/Hr) IV Continuous <Continuous>  pantoprazole  Injectable 40 milliGRAM(s) IV Push daily  petrolatum Ophthalmic Ointment 1 Application(s) Both EYES every 12 hours  piperacillin/tazobactam IVPB.. 4.5 Gram(s) IV Intermittent every 8 hours  polyethylene glycol 3350 17 Gram(s) Oral daily  propofol Infusion 25 MICROgram(s)/kG/Min (36.1 mL/Hr) IV Continuous <Continuous>  QUEtiapine 25 milliGRAM(s) Oral <User Schedule>  QUEtiapine 50 milliGRAM(s) Oral <User Schedule>  senna Syrup 5 milliLiter(s) Oral at bedtime  sodium chloride 3%  Inhalation 4 milliLiter(s) Inhalation every 6 hours       Interval Events:   -Propofol dose increased overnight   -Argatroban held for tracheostomy placement in AM  -Tube feeds held prior to midnight      HPI:  38 yo M w/ class III obesity, pAfib (no AC), HTN, BEA (on CPAP), history of b/l papilledema attributed to pseudotumor cerebri s/p LP in 2024 with very high opening pressure, who is transferred for VV ECMO for ARDS and severe hypoxia. Patient initially presented to [facility] on 2/24 for SOB and b/l LE edema. 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 He is a current every day smoker of Newports and marijuana, but does not drink or do other drugs. Currently lives with his mother. There is a long term partner in his life, but they are not , and they have an on/off relationship currently not very involved with each other as per the patient's mother and aunt.  At [facility] where he was getting an eval last year for shortness of breath, he had a sleep study and was diagnosed with sleep apnea, prescribed a PAP machine, which he has in his room but the mother is not sure how many nights per week he uses it. Recently, the last day or two, his mother and brother have been under the weather with URIs and the patient 2 days ago started to develop a ruynny nose, ough, some wheezing of the chest, as well as worsening shortness of breath and fevers at home. He called his aunt who told him to go get checked out and then he landed at the [facility] ED. Patient found to be reportedly hypoxemic to 70% on RA with worsening respiratory status/secretions and somnolence in the ED. Patient was intubated with difficulty (7 attempts) due to very large tongue secretions. Despite optimal vent management, patient remained hypoxemic. Unable to prone due to obesity. Patient cannulated for VV ECMO on 2/25 and transferred to [facility] for further management.     [facility] labs notable for MRSA PCR -, Fluvid -, urine legionella -, sputum gram stain  with GPC and GPR    CTA chest on 2/24 negative for pulmonary embolism, notable for patchy bilateral lower lobe opacities, hepatomegaly, mild ascites, edema/induration of the abdominal pannus.    LE duplex on 2/25 negative for DVT    TTE on 2/25 showed LV EF 61%, enlarged RV with TAPSE 2.1cm, ePASP at least 49 (Patient had 10/2024 TTE with normal LV and RV with ePASP 14).    Only home med is Lasix. Not on acetazolamide for pseudotumor or on Wegovy (was pending auth) (26 Feb 2025 01:48)      REVIEW OF SYSTEMS:  [x ] Unable to assess ROS because patient is intubated/sedated               Vital Signs Last 24 Hrs  T(C): 36.7 (07 Mar 2025 04:00), Max: 36.9 (07 Mar 2025 00:00)  T(F): 98.1 (07 Mar 2025 04:00), Max: 98.4 (07 Mar 2025 00:00)  HR: 66 (07 Mar 2025 06:00) (59 - 133)  BP: --  BP(mean): --  RR: 14 (07 Mar 2025 06:00) (13 - 19)  SpO2: 99% (07 Mar 2025 06:00) (95% - 100%)          I&O's Summary    05 Mar 2025 07:01  -  06 Mar 2025 07:00  --------------------------------------------------------  IN: 5907.7 mL / OUT: 7815 mL / NET: -1907.3 mL    06 Mar 2025 07:01  -  07 Mar 2025 06:45  --------------------------------------------------------  IN: 6234.3 mL / OUT: 7215 mL / NET: -980.7 mL          ECMO SETTINGS:  Type:		[ ] Venovenous		[ ] Venoarterial  Cannulation Site(s):     Flow:  	          P1:                  Delta P:  RPM: 		  P2:                  SVO2:    Oxygenator:	Sweep:     L/min	FiO2:     ABG - ( 07 Mar 2025 06:00 )  pH: 7.40  /  pCO2: 55    /  pO2: 126   / HCO3: 34    / Base Excess: 7.8   /  SaO2: 99.3                  VENTILATOR SETTINGS:     Mode: AC/ CMV (Assist Control/ Continuous Mandatory Ventilation)  RR (machine): 14  FiO2: 40  PEEP: 20  ITime: 1  MAP: 24  PC: 12  PIP: 32      Physical Exam:   Constitutional: ill appearing, no acute distress   HEENT: + PERRLA, EOMI, no drainage or redness  Neck: supple,  No JVD, Right IJ ecmo cannula in place   Respiratory: Ventilator assisted breath Sounds diminished bilaterally to auscultation, no accessory muscle use noted  Cardiovascular: Regular rate, regular rhythm, normal S1, S2; no murmurs or rub  Gastrointestinal: Obese, soft, non distended, no hepatosplenomegaly, normal bowel sounds  Extremities: + 2 peripheral edema, no cyanosis, no clubbing   Vascular: Equal and normal pulses: 2+ peripheral pulses throughout  Neurological: sedated/intubated  Musculoskeletal: No joint swelling or deformity; no limitation of movement  Skin: warm, dry, well perfused, cellulitis to pannus area, DTI to right buttocks, plantar blisters b/l feet        LABS:                          10.3   10.92 )-----------( 94       ( 06 Mar 2025 22:32 )             35.9                          03-06    144  |  106  |  36[H]  ----------------------------<  158[H]  3.8   |  27  |  0.74    Ca    8.4      06 Mar 2025 22:32  Phos  3.4     03-06  Mg     1.90     03-06    TPro  5.8[L]  /  Alb  2.8[L]  /  TBili  1.2  /  DBili  x   /  AST  35  /  ALT  81[H]  /  AlkPhos  49  03-06      LIVER FUNCTIONS - ( 06 Mar 2025 22:32 )  Alb: 2.8 g/dL / Pro: 5.8 g/dL / ALK PHOS: 49 U/L / ALT: 81 U/L / AST: 35 U/L / GGT: x             PT/INR - ( 06 Mar 2025 22:32 )   PT: 22.9 sec;   INR: 1.99 ratio         PTT - ( 06 Mar 2025 22:32 )  PTT:57.7 sec      Culture - Fungal, Bronchial (collected 05 Mar 2025 10:42)  Source: Bronchial Bronchial Lavage  Preliminary Report (06 Mar 2025 10:22):    Testing in progress    Culture - Acid Fast - Bronchial w/Smear (collected 05 Mar 2025 10:42)  Source: Bronchial Bronchial Lavage    Culture - Bronchial (collected 05 Mar 2025 10:42)  Source: Bronchial Bronchial Lavage  Gram Stain (05 Mar 2025 20:26):    Rare polymorphonuclear leukocytes per low power field    No squamous epithelial cells per low power field    No organisms seen per oil power field  Preliminary Report (06 Mar 2025 09:03):    No growth to date          ACTIVE MEDS:    MEDICATIONS  (STANDING):  albuterol/ipratropium for Nebulization 3 milliLiter(s) Nebulizer every 6 hours  argatroban Infusion 1.1 MICROgram(s)/kG/Min (15.9 mL/Hr) IV Continuous <Continuous>  chlorhexidine 0.12% Liquid 15 milliLiter(s) Oral Mucosa every 12 hours  chlorhexidine 2% Cloths 1 Application(s) Topical <User Schedule>  clotrimazole 1% Cream 1 Application(s) Topical two times a day  dexAMETHasone  IVPB 10 milliGRAM(s) IV Intermittent daily  dexMEDEtomidine Infusion 1 MICROgram(s)/kG/Hr (60.2 mL/Hr) IV Continuous <Continuous>  dextrose 50% Injectable 25 Gram(s) IV Push once  dextrose 50% Injectable 12.5 Gram(s) IV Push once  dextrose 50% Injectable 25 Gram(s) IV Push once  furosemide   Injectable 40 milliGRAM(s) IV Push every 6 hours  glucagon  Injectable 1 milliGRAM(s) IntraMuscular once  hydrALAZINE 50 milliGRAM(s) Oral every 8 hours  HYDROmorphone Infusion. 1 mG/Hr (1 mL/Hr) IV Continuous <Continuous>  insulin lispro (ADMELOG) corrective regimen sliding scale   SubCutaneous every 6 hours  multivitamin/minerals/iron Oral Solution (CENTRUM) 15 milliLiter(s) Oral daily  naloxegol 12.5 milliGRAM(s) Oral daily  niCARdipine Infusion 5 mG/Hr (25 mL/Hr) IV Continuous <Continuous>  pantoprazole  Injectable 40 milliGRAM(s) IV Push daily  petrolatum Ophthalmic Ointment 1 Application(s) Both EYES every 12 hours  piperacillin/tazobactam IVPB.. 4.5 Gram(s) IV Intermittent every 8 hours  polyethylene glycol 3350 17 Gram(s) Oral daily  propofol Infusion 25 MICROgram(s)/kG/Min (36.1 mL/Hr) IV Continuous <Continuous>  QUEtiapine 25 milliGRAM(s) Oral <User Schedule>  QUEtiapine 50 milliGRAM(s) Oral <User Schedule>  senna Syrup 5 milliLiter(s) Oral at bedtime  sodium chloride 3%  Inhalation 4 milliLiter(s) Inhalation every 6 hours       Interval Events:   -Propofol dose increased overnight   -Argatroban held for tracheostomy placement in AM  -Tube feeds held prior to midnight      HPI:  40 yo M w/ class III obesity, pAfib (no AC), HTN, BEA (on CPAP), history of b/l papilledema attributed to pseudotumor cerebri s/p LP in 2024 with very high opening pressure, who is transferred for VV ECMO for ARDS and severe hypoxia. Patient initially presented to [facility] on 2/24 for SOB and b/l LE edema. 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 He is a current every day smoker of Newports and marijuana, but does not drink or do other drugs. Currently lives with his mother. There is a long term partner in his life, but they are not , and they have an on/off relationship currently not very involved with each other as per the patient's mother and aunt.  At [facility] where he was getting an eval last year for shortness of breath, he had a sleep study and was diagnosed with sleep apnea, prescribed a PAP machine, which he has in his room but the mother is not sure how many nights per week he uses it. Recently, the last day or two, his mother and brother have been under the weather with URIs and the patient 2 days ago started to develop a ruynny nose, ough, some wheezing of the chest, as well as worsening shortness of breath and fevers at home. He called his aunt who told him to go get checked out and then he landed at the [facility] ED. Patient found to be reportedly hypoxemic to 70% on RA with worsening respiratory status/secretions and somnolence in the ED. Patient was intubated with difficulty (7 attempts) due to very large tongue secretions. Despite optimal vent management, patient remained hypoxemic. Unable to prone due to obesity. Patient cannulated for VV ECMO on 2/25 and transferred to [facility] for further management.     [facility] labs notable for MRSA PCR -, Fluvid -, urine legionella -, sputum gram stain  with GPC and GPR    CTA chest on 2/24 negative for pulmonary embolism, notable for patchy bilateral lower lobe opacities, hepatomegaly, mild ascites, edema/induration of the abdominal pannus.    LE duplex on 2/25 negative for DVT    TTE on 2/25 showed LV EF 61%, enlarged RV with TAPSE 2.1cm, ePASP at least 49 (Patient had 10/2024 TTE with normal LV and RV with ePASP 14).    Only home med is Lasix. Not on acetazolamide for pseudotumor or on Wegovy (was pending auth) (26 Feb 2025 01:48)      REVIEW OF SYSTEMS:  [x ] Unable to assess ROS because patient is intubated/sedated               Vital Signs Last 24 Hrs  T(C): 36.7 (07 Mar 2025 04:00), Max: 36.9 (07 Mar 2025 00:00)  T(F): 98.1 (07 Mar 2025 04:00), Max: 98.4 (07 Mar 2025 00:00)  HR: 66 (07 Mar 2025 06:00) (59 - 133)  BP: --  BP(mean): --  RR: 14 (07 Mar 2025 06:00) (13 - 19)  SpO2: 99% (07 Mar 2025 06:00) (95% - 100%)          I&O's Summary    05 Mar 2025 07:01  -  06 Mar 2025 07:00  --------------------------------------------------------  IN: 5907.7 mL / OUT: 7815 mL / NET: -1907.3 mL    06 Mar 2025 07:01  -  07 Mar 2025 06:45  --------------------------------------------------------  IN: 6234.3 mL / OUT: 7215 mL / NET: -980.7 mL          ECMO SETTINGS:  Type:		[x] Venovenous		[ ] Venoarterial  Cannulation Site(s): RIJ/RFem  Flow: 4.57	       P1: 163		Delta P: 30  RPM: 3110	       P2: 133		SVO2: 77.7  Sweep: 0.5 L/min:		            FIO2: 0.21      ABG - ( 07 Mar 2025 06:00 )  pH: 7.40  /  pCO2: 55    /  pO2: 126   / HCO3: 34    / Base Excess: 7.8   /  SaO2: 99.3                  VENTILATOR SETTINGS:     Mode: AC/ CMV (Assist Control/ Continuous Mandatory Ventilation)  RR (machine): 14  FiO2: 40  PEEP: 20  ITime: 1  MAP: 24  PC: 12  PIP: 32      Physical Exam:   Constitutional: ill appearing, no acute distress   HEENT: + PERRLA, EOMI, no drainage or redness  Neck: supple,  No JVD, Right IJ ecmo cannula in place   Respiratory: Ventilator assisted breath Sounds diminished bilaterally to auscultation, no accessory muscle use noted  Cardiovascular: Regular rate, regular rhythm, normal S1, S2; no murmurs or rub  Gastrointestinal: Obese, soft, non distended, no hepatosplenomegaly, normal bowel sounds  Extremities: + 2 peripheral edema, no cyanosis, no clubbing   Vascular: Equal and normal pulses: 2+ peripheral pulses throughout  Neurological: sedated/intubated  Musculoskeletal: No joint swelling or deformity; no limitation of movement  Skin: warm, dry, well perfused, cellulitis to pannus area, DTI to right buttocks, plantar blisters b/l feet        LABS:                          10.3   10.92 )-----------( 94       ( 06 Mar 2025 22:32 )             35.9                          03-06    144  |  106  |  36[H]  ----------------------------<  158[H]  3.8   |  27  |  0.74    Ca    8.4      06 Mar 2025 22:32  Phos  3.4     03-06  Mg     1.90     03-06    TPro  5.8[L]  /  Alb  2.8[L]  /  TBili  1.2  /  DBili  x   /  AST  35  /  ALT  81[H]  /  AlkPhos  49  03-06      LIVER FUNCTIONS - ( 06 Mar 2025 22:32 )  Alb: 2.8 g/dL / Pro: 5.8 g/dL / ALK PHOS: 49 U/L / ALT: 81 U/L / AST: 35 U/L / GGT: x             PT/INR - ( 06 Mar 2025 22:32 )   PT: 22.9 sec;   INR: 1.99 ratio         PTT - ( 06 Mar 2025 22:32 )  PTT:57.7 sec      Culture - Fungal, Bronchial (collected 05 Mar 2025 10:42)  Source: Bronchial Bronchial Lavage  Preliminary Report (06 Mar 2025 10:22):    Testing in progress    Culture - Acid Fast - Bronchial w/Smear (collected 05 Mar 2025 10:42)  Source: Bronchial Bronchial Lavage    Culture - Bronchial (collected 05 Mar 2025 10:42)  Source: Bronchial Bronchial Lavage  Gram Stain (05 Mar 2025 20:26):    Rare polymorphonuclear leukocytes per low power field    No squamous epithelial cells per low power field    No organisms seen per oil power field  Preliminary Report (06 Mar 2025 09:03):    No growth to date          ACTIVE MEDS:    MEDICATIONS  (STANDING):  albuterol/ipratropium for Nebulization 3 milliLiter(s) Nebulizer every 6 hours  argatroban Infusion 1.1 MICROgram(s)/kG/Min (15.9 mL/Hr) IV Continuous <Continuous>  chlorhexidine 0.12% Liquid 15 milliLiter(s) Oral Mucosa every 12 hours  chlorhexidine 2% Cloths 1 Application(s) Topical <User Schedule>  clotrimazole 1% Cream 1 Application(s) Topical two times a day  dexAMETHasone  IVPB 10 milliGRAM(s) IV Intermittent daily  dexMEDEtomidine Infusion 1 MICROgram(s)/kG/Hr (60.2 mL/Hr) IV Continuous <Continuous>  dextrose 50% Injectable 25 Gram(s) IV Push once  dextrose 50% Injectable 12.5 Gram(s) IV Push once  dextrose 50% Injectable 25 Gram(s) IV Push once  furosemide   Injectable 40 milliGRAM(s) IV Push every 6 hours  glucagon  Injectable 1 milliGRAM(s) IntraMuscular once  hydrALAZINE 50 milliGRAM(s) Oral every 8 hours  HYDROmorphone Infusion. 1 mG/Hr (1 mL/Hr) IV Continuous <Continuous>  insulin lispro (ADMELOG) corrective regimen sliding scale   SubCutaneous every 6 hours  multivitamin/minerals/iron Oral Solution (CENTRUM) 15 milliLiter(s) Oral daily  naloxegol 12.5 milliGRAM(s) Oral daily  niCARdipine Infusion 5 mG/Hr (25 mL/Hr) IV Continuous <Continuous>  pantoprazole  Injectable 40 milliGRAM(s) IV Push daily  petrolatum Ophthalmic Ointment 1 Application(s) Both EYES every 12 hours  piperacillin/tazobactam IVPB.. 4.5 Gram(s) IV Intermittent every 8 hours  polyethylene glycol 3350 17 Gram(s) Oral daily  propofol Infusion 25 MICROgram(s)/kG/Min (36.1 mL/Hr) IV Continuous <Continuous>  QUEtiapine 25 milliGRAM(s) Oral <User Schedule>  QUEtiapine 50 milliGRAM(s) Oral <User Schedule>  senna Syrup 5 milliLiter(s) Oral at bedtime  sodium chloride 3%  Inhalation 4 milliLiter(s) Inhalation every 6 hours

## 2025-03-07 NOTE — ASU DISCHARGE PLAN (ADULT/PEDIATRIC) - FINANCIAL ASSISTANCE
[facility] provides services at a reduced cost to those who are determined to be eligible through [facility]’s financial assistance program. Information regarding [facility]’s financial assistance program can be found by going to [email or web address] or by calling [phone].

## 2025-03-07 NOTE — PROGRESS NOTE ADULT - ASSESSMENT
38 yo M w/ class III obesity, pAfib (no AC), HTN, BEA (on CPAP), history of b/l papilledema attributed to pseudotumor cerebri, who is transferred from [place] on 2/26 for VV ECMO 2/2 ARDS. Patient initially presented to [place] on 2/24 for SOB and b/l LE edema. As per chart review, patient endorses some degree of SOB and b/l LE edema x 3 months with significant weight gain. As per ICU team, patient had positive sick contacts with viral URI 1 to 2 weeks PTA and since then has had worsening SOB. Patient found to be reportedly hypoxemic to 70% on RA with worsening respiratory status/secretions and somnolence in the ED. Patient was a difficult intubation (~6 attempts). Despite optimal vent management, patient remained hypoxemic. Unable to prone due to obesity. Patient cannulated for VV ECMO on 2/25 and transferred to [facility] for further management.     Neuro  #Mental status   #pseudotumor cerebri  - history of pseudotumor cerebri s/p LP under fluoro in 2024 with high opening pressure with MRI 2024 also showed possible pituitary mass but unclear because of pressure effect from pseudotumor cerebri causing partially empty sella. Considering to start acetazolamide outpatient but never started  - prolactin high (30), defer checking cortisol axis given already received steroids but should be investigated, ACTH (<1.5) low but should be suppressed iso steroids  - concern for pituitary adenoma, however TSH .79 normal,  fT4 0.9 normal, and low T3 67  - sedated with Dilaudid, low dose propofol, and Precedex gtt wean as tolerated/indicated  - with lowered sedation pt awake, EASLEY, able to follow commands, however became restless pulling at lines and ETT  - Seroquel 25/50 for agitation/ICU delirium -  monitor QTc  - paralytics d/c'd 2/27   - PT/OT following     Cardiovascular  #HTN   #Atrial fibrillation   - Hx of a. fib not on AC   - echo from 2024 did not demonstrate elevation of pulmonary pressures, but now with severe PH suggesting chronicity on TTE at    - TTE on 2/25 showed LV EF 61%, enlarged RV with TAPSE 2.1cm, ePASP at least 49 (Patient had 10/2024 TTE with normal LV and RV with ePASP 14).  - ROBERT 2/26 showing VTI 17  - ROBERT 3/1 showing mild TR, enlarged RV with normal LV/RV function. PEEP increased to 24 without signs of RV dilation  - troponins initially elevated, peaked at 162 however downtrended likely from RV dilation and strain - EKG neg for any typical ischemia  - s/p esmolol drip for high flows now D/C'd may need to consider again if need for higher flows  - titrate hydral, may add enalaprilat if persistent hypertension, and assist weaning off Cardene gtt which has been utilized prn   - ECG daily to monitor QTc  as has been on higher end, currently acceptable    Pulmonary  #Pulmonary HTN  #ARDS  #Multifocal Pneumonia   #BEA   - history of diagnosed BEA/?OHS intermittent compliance to home PAP  - c/f acute on suspected chronic pulmonary hypertension in setting of possible BEA/OHS c/b pneumonia, ARDS, and pulmonary edema   - CTA chest on 2/24 negative for pulmonary embolism, notable for patchy bilateral lower lobe opacities, hepatomegaly, mild ascites, edema/induration of the abdominal pannus.  - Full RVP and BAL cultures negative    - Bronchoscopy demonstrated copious purulent secretions 2/26 and 2/27  - Bronchoscopy 3/1 noted with small amount of old, dried blood at R and L mainstem, not occluding airway  - Bronchoscopy 3/5 with evidence of bleeding in the LLL and the distal trachea/RMSB lateral aspect of the bifurcation. BAL ngtd  - s/p Dexamethasone 20mg x 5 days, now on 10 mg x5 days -> taper on day 11 by 2 mg every 2 days  - C/w duoNeb q6 hours + 3% saline - stop IPV due to ET tube migration and minimal secretion burden  - c/w PC/AC PIP:32  RR 14 PC: 12 PEEP:20 Fio2 40% ~500-900ml  - difficult glottic visualization due to large tongue upon intubation at [facility] 2/25 but able to visualize here easily with S4 video laryngoscope  - plan for tracheostomy and possible peg placement Friday 3/7 by CT surgery in OR    #ECMO  VV ECMO		  Cannulation sites/dates:  Flow: 4.49	       P1: 175		Delta P: 33  RPM: 3090	       P2: 142		SVO2: 83  Sweep: 0.5 L/min:		            FIO2: 0.21      - was requiring high flows for oxygenation causing high delta P but D/V adequate, no lactate and SVO2 WNL with some hemolysis and minor oozing form L subclavian line  - Clinically perfusing.  - ECMO sweep sighing q1hr   - Adjust circuit flow as tolerated with DO2:VO2 goal >2  - Monitor for hemolysis, chatter, evidence of recirculation, mixing      Gastrointestinal  #Diet   #Transaminitis - resolving  - tolerating tube feeds  - plan NPO after midnight 3/6 for trach/peg placement in OR 3/7  - elevated Tbili likely iso hemolysis 2/2 high ECMO flows, mild transaminitis now improving  - hepatomegaly and mild ascites noted on CT A/P - no pocket to tap  - RUQ ultrasound with steatosis    - c/w bowel regimen with Senna, Miralax and Movantik - having BMs adequately from 3/5 with aggressive regimen  - titrate bowel regimen as per BMs  - triglycerides have been low  - nutrition following       Gu/Renal  #ELLA  #Hypernatremia   #Non-AG Metabolic Alkalosis  - ELLA - improved, likely ATN/vascular congestion  - good UO, overall net negative  - c/w free h20 250ml q6 hr for hypernatremia  - s/p intermittent diuresis, started lasix gtt 3/2 to assist with aggressive diuresis, monitor urine and electrolytes closely   - contraction alkalosis likely 2/2 diuretics, given albumin 250ml x1, acetazolamide 500mg, lasix gtt discontinued 3/4   - Lasix 40mg q6 h    Infectious disease  #leukocytosis  #Pneumonia   #cellulitis   - Leukocytosis now likely iso steroids, remains afebrile  - s/p cefepime, linezolid, and aztihro 2/25-2/26   - s/p vanco (2/26-3/1) and c/w zosyn for a total of ~10 days (2/26-3/8) to cover cellulitis/pneumonia - will check CT chest some time after tracheostomy and decannulation to assess progression of pneumonia may extend course as there is some c/f poss clinically stable necrotizing pneumonia - total abx course duration tbd  - full RVP negative  - BCx x 2 2/24 and 2/26 currently NGTD, UCx 2/25 NGTD, tracheal aspirate Cx from 2/25 with normal commensal kyle   - strep pneumo Ag and urine legionella negative   - s/p Mupiricon b/l nares for +MSSA  (2/26-3/3)  - BAL cultures 3/1 currently NGTD, previous BAL 2/26 NGTD  - follow up BAL sent 3/5  - ?penile meatus yellow discharge resolved, GC/chamydia and HIV negative   - candidal intertrigo +/- cellulitis of the pannus - clotrimazole cream 1% BID 2/26 -+ abx as per above    Hematology/DVT ppx  #Anticoagulation   #Thrombocytopenia  #Anemia   - Hb stable 11.2, thrombocytopenia likely r/t shearing 2/2 ECMO circuit, now improving  - 4T score low probability of HIT, received heparin at OSH, PF4 neg, SHAWANDA pending  - c/w argatroban with goal 50-70  - LE duplex 2/25 neg  - s/p 1 u PRBC 2/26 for O2 delivery  - may need to consider platelet transfusion as oozing from L subclavian noted    Endocrine  #DM  -A1c 6.7   - obesity with metabolic syndrome  - diabetes with steroid induced hyperglycemia, mild - ISS  - c/w decadron and taper iso ARDS  - pituitary workup largely unremarkable, steroids as above given recent weight gain but likely large component of volume though  - c/w FS Q6h and moderate ISS    SKin/Lines  #Access  #Cellulitis   #DTI with skin sloughing  - L subclavian TLC inserted 2/25   - L radial artery line 2/25  - RIJ ECMO cannula 2/26 at 20 cm  - R fem ECMO cannula 2/26 at 25 cm  - candidal intertrigo +/- cellulitis of the pannus - clotrimazole cream 1% BID 2/26 -+ abx as per above  - DTI to right buttocks (sloughing of skin) and B/L feet   - wound consult placed     #Blisters  Blisters noted soles of b/l feet   - [facility] STEPHEN/PVR to evaluate for PAD  - podiatry consulted for worsening of b/l foot blisters- recs vascular consult  - Vascular consult placed- f/u recs  - [facility] duplex b/l LE ordered      GOC   - full code   - palliative involved  and following while on ECMO   - Mother, father involved in care are next of kin/surrogates and patient's girlfriend/partner is also involved but defers to parents   38 yo M w/ class III obesity, pAfib (no AC), HTN, BEA (on CPAP), history of b/l papilledema attributed to pseudotumor cerebri, who is transferred from [place] on 2/26 for VV ECMO 2/2 ARDS. Patient initially presented to [place] on 2/24 for SOB and b/l LE edema. As per chart review, patient endorses some degree of SOB and b/l LE edema x 3 months with significant weight gain. As per ICU team, patient had positive sick contacts with viral URI 1 to 2 weeks PTA and since then has had worsening SOB. Patient found to be reportedly hypoxemic to 70% on RA with worsening respiratory status/secretions and somnolence in the ED. Patient was a difficult intubation (~6 attempts). Despite optimal vent management, patient remained hypoxemic. Unable to prone due to obesity. Patient cannulated for VV ECMO on 2/25 and [facility].     Neuro  #Mental status   #pseudotumor cerebri  - history of pseudotumor cerebri s/p LP under fluoro in 2024 with high opening pressure with MRI 2024 also showed possible pituitary mass but unclear because of pressure effect from pseudotumor cerebri causing partially empty sella. Considering to start acetazolamide outpatient but never started  - prolactin high (30), defer checking cortisol axis given already received steroids but should be investigated, ACTH (<1.5) low but should be suppressed iso steroids  - concern for pituitary adenoma, however TSH .79 normal,  fT4 0.9 normal, and low T3 67  - sedated with Dilaudid, low dose propofol, and Precedex gtt wean as tolerated/indicated  - with lowered sedation pt awake, EASLEY, able to follow commands, however became restless pulling at lines and ETT  - Seroquel 50mg BID for agitation/ICU delirium -  monitor QTc  - paralytics d/c'd 2/27   - PT/OT following     Cardiovascular  #HTN   #Atrial fibrillation   - Hx of a. fib not on AC   - echo from 2024 did not demonstrate elevation of pulmonary pressures, but now with severe PH suggesting chronicity on TTE at    - TTE on 2/25 showed LV EF 61%, enlarged RV with TAPSE 2.1cm, ePASP at least 49 (Patient had 10/2024 TTE with normal LV and RV with ePASP 14).  - ROBERT 2/26 showing VTI 17  - ROBERT 3/1 showing mild TR, enlarged RV with normal LV/RV function. PEEP increased to 24 without signs of RV dilation  - troponins initially elevated, peaked at 162 however downtrended likely from RV dilation and strain - EKG neg for any typical ischemia  - s/p esmolol drip for high flows now D/C'd may need to consider again if need for higher flows  - titrate hydral, may add enalaprilat if persistent hypertension, and assist weaning off Cardene gtt which has been utilized prn   - ECG daily to monitor QTc  as has been on higher end, currently acceptable    Pulmonary  #Pulmonary HTN  #ARDS  #Multifocal Pneumonia   #BEA   - history of diagnosed BEA/?OHS intermittent compliance to home PAP  - c/f acute on suspected chronic pulmonary hypertension in setting of possible BEA/OHS c/b pneumonia, ARDS, and pulmonary edema   - CTA chest on 2/24 negative for pulmonary embolism, notable for patchy bilateral lower lobe opacities, hepatomegaly, mild ascites, edema/induration of the abdominal pannus.  - Full RVP and BAL cultures negative    - Bronchoscopy demonstrated copious purulent secretions 2/26 and 2/27  - Bronchoscopy 3/1 noted with small amount of old, dried blood at R and L mainstem, not occluding airway  - Bronchoscopy 3/5 with evidence of bleeding in the LLL and the distal trachea/RMSB lateral aspect of the bifurcation. BAL ngtd  - s/p Dexamethasone 20mg x 5 days, now on 10 mg x5 days -> taper on day 11 by 2 mg every 2 days  - C/w duoNeb q6 hours + 3% saline - stop IPV due to ET tube migration and minimal secretion burden  - c/w PC/AC PIP:32  RR 14 PC: 12 PEEP:20 Fio2 40% ~500-900ml  - difficult glottic visualization due to large tongue upon intubation at [facility] 2/25 but able to visualize here easily with S4 video laryngoscope  - plan for tracheostomy and possible peg placement Friday 3/7 by CT surgery in OR    #ECMO  VV ECMO		  Cannulation sites/dates:  Flow: 4.57	       P1: 163		Delta P: 30  RPM: 3110	       P2: 133		SVO2: 77.7  Sweep: 0.5 L/min:		            FIO2: 0.21      - was requiring high flows for oxygenation causing high delta P but D/V adequate, no lactate and SVO2 WNL with some hemolysis and minor oozing form L subclavian line  - Clinically perfusing.  - ECMO sweep sighing q1hr   - Adjust circuit flow as tolerated with DO2:VO2 goal >2  - Monitor for hemolysis, chatter, evidence of recirculation, mixing      Gastrointestinal  #Diet   #Transaminitis - resolving  - tolerated tube feeds via NGT  - now s/p PEG placement  - elevated Tbili likely iso hemolysis 2/2 high ECMO flows, mild transaminitis now improving  - hepatomegaly and mild ascites noted on CT A/P - no pocket to tap  - RUQ ultrasound with steatosis    - c/w bowel regimen with Senna, Miralax and Movantik - having BMs adequately from 3/5 with aggressive regimen  - titrate bowel regimen as per BMs  - triglycerides have been low  - nutrition following       Gu/Renal  #ELLA  #Hypernatremia   #Non-AG Metabolic Alkalosis  - ELLA - improved, likely ATN/vascular congestion  - good UO, overall net negative  - c/w free h20 250ml q6 hr for hypernatremia  - s/p intermittent diuresis, started lasix gtt 3/2 to assist with  - contraction alkalosis likely 2/2 diuretics, given albumin 250ml x1, acetazolamide 500mg, lasix gtt discontinued 3/4   - Lasix 40mg q6 h discontinued, will restart lasix gtt infusion, goal overall net negative -3L for the shift  - monitor urine and electrolytes closely given aggressive diuresis      Infectious disease  #leukocytosis  #Pneumonia   #cellulitis   - Leukocytosis now likely iso steroids, remains afebrile  - s/p cefepime, linezolid, and aztihro 2/25-2/26   - s/p vanco (2/26-3/1) and c/w zosyn for a total of ~10 days (2/26-3/8) to cover cellulitis/pneumonia - will check CT chest some time after tracheostomy and decannulation to assess progression of pneumonia may extend course as there is some c/f poss clinically stable necrotizing pneumonia - total abx course duration tbd  - full RVP negative  - BCx x 2 2/24 and 2/26 currently NGTD, UCx 2/25 NGTD, tracheal aspirate Cx from 2/25 with normal commensal kyle   - strep pneumo Ag and urine legionella negative   - s/p Mupiricon b/l nares for +MSSA  (2/26-3/3)  - BAL cultures 3/1 currently NGTD, previous BAL 2/26 NGTD  - follow up BAL sent 3/5  - ?penile meatus yellow discharge resolved, GC/chamydia and HIV negative   - candidal intertrigo +/- cellulitis of the pannus - clotrimazole cream 1% BID 2/26 -+ abx as per above    Hematology/DVT ppx  #Anticoagulation   #Thrombocytopenia  #Anemia   - Hb stable 10.1, thrombocytopenia likely r/t shearing 2/2 ECMO circuit, continue to monitor  - 4T score low probability of HIT, received heparin at OSH, PF4 neg, SHAWANDA pending  - argatroban held for tracheostomy this AM, will restart post procedure with goal 50-70  - LE duplex 2/25 neg  - s/p 1 u PRBC 2/26 for O2 delivery  - may need to consider platelet transfusion as oozing from L subclavian noted - now resolved    Endocrine  #DM  -A1c 6.7   - obesity with metabolic syndrome  - diabetes with steroid induced hyperglycemia, mild - ISS  - c/w decadron and taper iso ARDS  - pituitary workup largely unremarkable, steroids as above given recent weight gain but likely large component of volume though  - c/w FS Q6h and moderate ISS    SKin/Lines  #Access  #Cellulitis   #DTI with skin sloughing  - L subclavian TLC inserted 2/25   - L radial artery line 2/25  - RIJ ECMO cannula 2/26 at 20 cm  - R fem ECMO cannula 2/26 at 25 cm  - candidal intertrigo +/- cellulitis of the pannus - clotrimazole cream 1% BID 2/26 -+ abx as per above  - DTI to right buttocks (sloughing of skin) and B/L feet   - wound consult placed     #Blisters  Blisters noted soles of b/l feet   - [facility] STEPHEN/PVR to evaluate for PAD  - podiatry consulted for worsening of b/l foot blisters- recs vascular consult  - Vascular consult placed- f/u recs  - [facility] duplex b/l LE ordered      GOC   - full code   - palliative involved  and following while on ECMO   - Mother, father involved in care are next of kin/surrogates and patient's girlfriend/partner is also involved but defers to parents   40 yo M w/ class III obesity, pAfib (no AC), HTN, BEA (on CPAP), history of b/l papilledema attributed to pseudotumor cerebri, who is transferred from [place] on 2/26 for VV ECMO 2/2 ARDS. Patient initially presented to [place] on 2/24 for SOB and b/l LE edema. As per chart review, patient endorses some degree of SOB and b/l LE edema x 3 months with significant weight gain. As per ICU team, patient had positive sick contacts with viral URI 1 to 2 weeks PTA and since then has had worsening SOB. Patient found to be reportedly hypoxemic to 70% on RA with worsening respiratory status/secretions and somnolence in the ED. Patient was a difficult intubation (~6 attempts). Despite optimal vent management, patient remained hypoxemic. Unable to prone due to obesity. Patient cannulated for VV ECMO on 2/25 and transferred to [facility] for further management.     Neuro  #Mental status   #pseudotumor cerebri  - history of pseudotumor cerebri s/p LP under fluoro in 2024 with high opening pressure with MRI 2024 also showed possible pituitary mass but unclear because of pressure effect from pseudotumor cerebri causing partially empty sella. Considering to start acetazolamide outpatient but never started  - prolactin high (30), defer checking cortisol axis given already received steroids but should be investigated, ACTH (<1.5) low but should be suppressed iso steroids  - concern for pituitary adenoma, however TSH .79 normal,  fT4 0.9 normal, and low T3 67  - sedated with Dilaudid, low dose propofol, and Precedex gtt wean as tolerated/indicated  - with lowered sedation pt awake, EASLEY, able to follow commands, however became restless pulling at lines and ETT  - Seroquel increased to 50mg BID for agitation/ICU delirium -  monitor QTc - PO meds held iso new PEG placement, can resume x24 hrs  - zyprexa 10mg q 8 x24 hrs for agitation and to assist with sedation wean while kept NPO   - paralytics d/c'd 2/27   - PT/OT following     Cardiovascular  #HTN   #Atrial fibrillation   - Hx of a. fib not on AC   - echo from 2024 did not demonstrate elevation of pulmonary pressures, but now with severe PH suggesting chronicity on TTE at    - TTE on 2/25 showed LV EF 61%, enlarged RV with TAPSE 2.1cm, ePASP at least 49 (Patient had 10/2024 TTE with normal LV and RV with ePASP 14).  - ROBERT 2/26 showing VTI 17  - ROBERT 3/1 showing mild TR, enlarged RV with normal LV/RV function. PEEP increased to 24 without signs of RV dilation  - troponins initially elevated, peaked at 162 however downtrended likely from RV dilation and strain - EKG neg for any typical ischemia  - s/p esmolol drip for high flows now D/C'd may need to consider again if need for higher flows  - c/w hydralazine 50mg 8 hrs for HTN and titrate as needed- PO meds held iso new PEG placement, can give IVP for now and resume PO x24 hrs  - enalaprilat 1.25mg IV q6 hrs added for persistent hypertension and to assist weaning off Cardene gtt which has been utilized prn, can switch to PO x24hrs given new PEG placement  - ECG daily to monitor QTc  as has been on higher end, currently acceptable    Pulmonary  #Pulmonary HTN  #ARDS  #Multifocal Pneumonia   #BEA   - history of diagnosed BEA/?OHS intermittent compliance to home PAP  - c/f acute on suspected chronic pulmonary hypertension in setting of possible BEA/OHS c/b pneumonia, ARDS, and pulmonary edema   - CTA chest on 2/24 negative for pulmonary embolism, notable for patchy bilateral lower lobe opacities, hepatomegaly, mild ascites, edema/induration of the abdominal pannus.  - Full RVP and BAL cultures negative    - Bronchoscopy demonstrated copious purulent secretions 2/26 and 2/27  - Bronchoscopy 3/1 noted with small amount of old, dried blood at R and L mainstem, not occluding airway  - Bronchoscopy 3/5 with evidence of bleeding in the LLL and the distal trachea/RMSB lateral aspect of the bifurcation. BAL ngtd  - s/p Dexamethasone 20mg x 5 days, now on 10 mg x5 days -> taper on day 11 by 2 mg every 2 days  - C/w duoNeb q6 hours + 3% saline - stop IPV due to ET tube migration and minimal secretion burden  - c/w PC/AC PIP:32  RR 14 PC: 12 PEEP:20 Fio2 40% ~500-900ml  - difficult glottic visualization due to large tongue upon intubation at [facility] 2/25 but able to visualize here easily with S4 video laryngoscope  - S/p Flex bronchoscopy tracheostomy #6 portex proximal XLT, placed 3/7 by CT surgery in OR    #ECMO  VV ECMO		  Cannulation sites/dates:  Flow: 4.57	       P1: 163		Delta P: 30  RPM: 3110	       P2: 133		SVO2: 77.7  Sweep: 0.5 L/min:		            FIO2: 0.21      - was requiring high flows for oxygenation causing high delta P but D/V adequate, no lactate and SVO2 WNL with some hemolysis and minor oozing form L subclavian line  - Clinically perfusing.  - ECMO sweep sighing q1hr   - Adjust circuit flow as tolerated with DO2:VO2 goal >2  - Monitor for hemolysis, chatter, evidence of recirculation, mixing      Gastrointestinal  #Diet   #Transaminitis - resolving  - tolerated tube feeds via NGT  - now s/p Endoscopic percutaneous gastrostomy LUQ PEG 3/7, will place to gravity and hold feeds/meds x24 hours  - elevated Tbili likely iso hemolysis 2/2 high ECMO flows, mild transaminitis now improving  - hepatomegaly and mild ascites noted on CT A/P - no pocket to tap  - RUQ ultrasound with steatosis    - c/w bowel regimen with Senna, Miralax and Movantik - having BMs adequately from 3/5 with aggressive regimen -PO meds held iso new PEG placement, can resume x24 hrs  - titrate bowel regimen as per BMs  - triglycerides have been low  - nutrition following       Gu/Renal  #ELLA  #Hypernatremia   #Non-AG Metabolic Alkalosis  - ELLA - improved, likely ATN/vascular congestion  - good UO, overall net negative  - c/w free h20 250ml q6 hr for hypernatremia - held today given NPO iso new PEG placement, can resume x24 hrs if needed  - s/p intermittent diuresis, started lasix gtt 3/2 to assist with  - contraction alkalosis likely 2/2 diuretics, given albumin 250ml x1, acetazolamide 500mg, lasix gtt discontinued 3/4   - Lasix 40mg q6 h discontinued, will restart lasix gtt infusion, goal overall net negative -3L for the shift  - monitor urine and electrolytes closely given aggressive diuresis      Infectious disease  #leukocytosis  #Pneumonia   #cellulitis   - Leukocytosis now likely iso steroids, remains afebrile  - s/p cefepime, linezolid, and aztihro 2/25-2/26   - s/p vanco (2/26-3/1) and c/w zosyn for a total of ~10 days (2/26-3/8) to cover cellulitis/pneumonia - will check CT chest some time after tracheostomy and decannulation to assess progression of pneumonia may extend course as there is some c/f poss clinically stable necrotizing pneumonia - total abx course duration tbd  - full RVP negative  - BCx x 2 2/24 and 2/26 currently NGTD, UCx 2/25 NGTD, tracheal aspirate Cx from 2/25 with normal commensal kyle   - strep pneumo Ag and urine legionella negative   - s/p Mupiricon b/l nares for +MSSA  (2/26-3/3)  - BAL cultures 3/1 currently NGTD, previous BAL 2/26 NGTD  - follow up BAL sent 3/5 ngtd  - ?penile meatus yellow discharge resolved, GC/chamydia and HIV negative   - candidal intertrigo +/- cellulitis of the pannus - clotrimazole cream 1% BID 2/26 -+ abx as per above    Hematology/DVT ppx  #Anticoagulation   #Thrombocytopenia  #Anemia   - Hb stable 10.1, thrombocytopenia likely r/t shearing 2/2 ECMO circuit, continue to monitor  - 4T score low probability of HIT, received heparin at OSH, PF4 neg, SHAWANDA pending  - argatroban on hold post tracheostomy x24 hours, will resume tomorrow  with goal 50-70  - LE duplex 2/25 neg  - s/p 1 u PRBC 2/26 for O2 delivery  - may need to consider platelet transfusion as oozing from L subclavian noted - now resolved    Endocrine  #DM  -A1c 6.7   - obesity with metabolic syndrome  - diabetes with steroid induced hyperglycemia, mild - ISS  - c/w decadron and taper iso ARDS  - pituitary workup largely unremarkable, steroids as above given recent weight gain but likely large component of volume though  - c/w FS Q6h and moderate ISS    SKin/Lines  #Access  #Cellulitis   #DTI with skin sloughing  - L subclavian TLC inserted 2/25   - L radial artery line 2/25  - RIJ ECMO cannula 2/26 at 20 cm  - R fem ECMO cannula 2/26 at 25 cm  - candidal intertrigo +/- cellulitis of the pannus - clotrimazole cream 1% BID 2/26 -+ abx as per above  - DTI to right buttocks (sloughing of skin) and B/L feet   - wound consult placed     #Blisters  Blisters noted soles of b/l feet   - VA STEPHEN/PVR to evaluate for PAD  - podiatry consulted for worsening of b/l foot blisters- recs vascular consult  - Vascular consult placed- f/u recs  - VA duplex b/l LE ordered      GOC   - full code   - palliative involved  and following while on ECMO   - Mother, father involved in care are next of kin/surrogates and patient's girlfriend/partner is also involved but defers to parents   38 yo M w/ class III obesity, pAfib (no AC), HTN, BEA (on CPAP), history of b/l papilledema attributed to pseudotumor cerebri, who is transferred from [place] on 2/26 for VV ECMO 2/2 ARDS. Patient initially presented to [place] on 2/24 for SOB and b/l LE edema. As per chart review, patient endorses some degree of SOB and b/l LE edema x 3 months with significant weight gain. As per ICU team, patient had positive sick contacts with viral URI 1 to 2 weeks PTA and since then has had worsening SOB. Patient found to be reportedly hypoxemic to 70% on RA with worsening respiratory status/secretions and somnolence in the ED. Patient was a difficult intubation (~6 attempts). Despite optimal vent management, patient remained hypoxemic. Unable to prone due to obesity. Patient cannulated for VV ECMO on 2/25 and transferred to [facility] for further management.     Neuro  #Mental status   #pseudotumor cerebri  - history of pseudotumor cerebri s/p LP under fluoro in 2024 with high opening pressure with MRI 2024 also showed possible pituitary mass but unclear because of pressure effect from pseudotumor cerebri causing partially empty sella. Considering to start acetazolamide outpatient but never started  - prolactin high (30), defer checking cortisol axis given already received steroids but should be investigated, ACTH (<1.5) low but should be suppressed iso steroids  - concern for pituitary adenoma, however TSH .79 normal,  fT4 0.9 normal, and low T3 67  - sedated with Dilaudid, low dose propofol, and Precedex gtt wean as tolerated/indicated  - with lowered sedation pt awake, EASLEY, able to follow commands, however became restless pulling at lines and ETT  - Seroquel increased to 50mg BID for agitation/ICU delirium -  monitor QTc - PO meds held iso new PEG placement, can resume x24 hrs  - zyprexa 5mg q 8 x24 hrs for agitation and to assist with sedation wean while kept NPO   - paralytics d/c'd 2/27   - PT/OT following     Cardiovascular  #HTN   #Atrial fibrillation   - Hx of a. fib not on AC   - echo from 2024 did not demonstrate elevation of pulmonary pressures, but now with severe PH suggesting chronicity on TTE at    - TTE on 2/25 showed LV EF 61%, enlarged RV with TAPSE 2.1cm, ePASP at least 49 (Patient had 10/2024 TTE with normal LV and RV with ePASP 14).  - ROBERT 2/26 showing VTI 17  - ROBERT 3/1 showing mild TR, enlarged RV with normal LV/RV function. PEEP increased to 24 without signs of RV dilation  - troponins initially elevated, peaked at 162 however downtrended likely from RV dilation and strain - EKG neg for any typical ischemia  - s/p esmolol drip for high flows now D/C'd may need to consider again if need for higher flows  - c/w hydralazine 50mg 8 hrs for HTN and titrate as needed- PO meds held iso new PEG placement, can give IVP for now and resume PO x24 hrs  - enalaprilat 1.25mg IV q6 hrs added for persistent hypertension and to assist weaning off Cardene gtt which has been utilized prn, can switch to PO x24hrs given new PEG placement  - ECG daily to monitor QTc  as has been on higher end, currently acceptable    Pulmonary  #Pulmonary HTN  #ARDS  #Multifocal Pneumonia   #BEA   - history of diagnosed BEA/?OHS intermittent compliance to home PAP  - c/f acute on suspected chronic pulmonary hypertension in setting of possible BEA/OHS c/b pneumonia, ARDS, and pulmonary edema   - CTA chest on 2/24 negative for pulmonary embolism, notable for patchy bilateral lower lobe opacities, hepatomegaly, mild ascites, edema/induration of the abdominal pannus.  - Full RVP and BAL cultures negative    - Bronchoscopy demonstrated copious purulent secretions 2/26 and 2/27  - Bronchoscopy 3/1 noted with small amount of old, dried blood at R and L mainstem, not occluding airway  - Bronchoscopy 3/5 with evidence of bleeding in the LLL and the distal trachea/RMSB lateral aspect of the bifurcation. BAL ngtd  - s/p Dexamethasone 20mg x 5 days, now on 10 mg x5 days -> taper on day 11 by 2 mg every 2 days  - C/w duoNeb q6 hours + 3% saline - stop IPV due to ET tube migration and minimal secretion burden  - c/w PC/AC PIP:32  RR 14 PC: 12 PEEP:20 Fio2 40% ~500-900ml  - difficult glottic visualization due to large tongue upon intubation at [facility] 2/25 but able to visualize here easily with S4 video laryngoscope  - S/p Flex bronchoscopy tracheostomy #6 portex proximal XLT, placed 3/7 by CT surgery in OR    #ECMO  VV ECMO		  Cannulation sites/dates:  Flow: 4.57	       P1: 163		Delta P: 30  RPM: 3110	       P2: 133		SVO2: 77.7  Sweep: 0.5 L/min:		            FIO2: 0.21      - was requiring high flows for oxygenation causing high delta P but D/V adequate, no lactate and SVO2 WNL with some hemolysis and minor oozing form L subclavian line  - Clinically perfusing.  - ECMO sweep sighing q1hr   - Adjust circuit flow as tolerated with DO2:VO2 goal >2  - Monitor for hemolysis, chatter, evidence of recirculation, mixing      Gastrointestinal  #Diet   #Transaminitis - resolving  - tolerated tube feeds via NGT  - now s/p Endoscopic percutaneous gastrostomy LUQ PEG 3/7, will place to gravity and hold feeds/meds x24 hours  - elevated Tbili likely iso hemolysis 2/2 high ECMO flows, mild transaminitis now improving  - hepatomegaly and mild ascites noted on CT A/P - no pocket to tap  - RUQ ultrasound with steatosis    - c/w bowel regimen with Senna, Miralax and Movantik - having BMs adequately from 3/5 with aggressive regimen -PO meds held iso new PEG placement, can resume x24 hrs  - titrate bowel regimen as per BMs  - triglycerides have been low  - nutrition following       Gu/Renal  #ELLA  #Hypernatremia   #Non-AG Metabolic Alkalosis  - ELLA - improved, likely ATN/vascular congestion  - good UO, overall net negative  - c/w free h20 250ml q6 hr for hypernatremia - held today given NPO iso new PEG placement, can resume x24 hrs if needed  - s/p intermittent diuresis, started lasix gtt 3/2 to assist with  - contraction alkalosis likely 2/2 diuretics, given albumin 250ml x1, acetazolamide 500mg, lasix gtt discontinued 3/4   - Lasix 40mg q6 h discontinued, will restart lasix gtt infusion, goal overall net negative -3L for the shift  - monitor urine and electrolytes closely given aggressive diuresis      Infectious disease  #leukocytosis  #Pneumonia   #cellulitis   - Leukocytosis now likely iso steroids, remains afebrile  - s/p cefepime, linezolid, and aztihro 2/25-2/26   - s/p vanco (2/26-3/1) and c/w zosyn for a total of ~10 days (2/26-3/8) to cover cellulitis/pneumonia - will check CT chest some time after tracheostomy and decannulation to assess progression of pneumonia may extend course as there is some c/f poss clinically stable necrotizing pneumonia - total abx course duration tbd  - full RVP negative  - BCx x 2 2/24 and 2/26 currently NGTD, UCx 2/25 NGTD, tracheal aspirate Cx from 2/25 with normal commensal kyle   - strep pneumo Ag and urine legionella negative   - s/p Mupiricon b/l nares for +MSSA  (2/26-3/3)  - BAL cultures 3/1 currently NGTD, previous BAL 2/26 NGTD  - follow up BAL sent 3/5 ngtd  - ?penile meatus yellow discharge resolved, GC/chamydia and HIV negative   - candidal intertrigo +/- cellulitis of the pannus - clotrimazole cream 1% BID 2/26 -+ abx as per above    Hematology/DVT ppx  #Anticoagulation   #Thrombocytopenia  #Anemia   - Hb stable 10.1, thrombocytopenia likely r/t shearing 2/2 ECMO circuit, continue to monitor  - 4T score low probability of HIT, received heparin at OSH, PF4 neg, SHAWANDA pending  - argatroban on hold post tracheostomy x24 hours, will resume tomorrow  with goal 50-70  - LE duplex 2/25 neg  - s/p 1 u PRBC 2/26 for O2 delivery  - may need to consider platelet transfusion as oozing from L subclavian noted - now resolved    Endocrine  #DM  -A1c 6.7   - obesity with metabolic syndrome  - diabetes with steroid induced hyperglycemia, mild - ISS  - c/w decadron and taper iso ARDS  - pituitary workup largely unremarkable, steroids as above given recent weight gain but likely large component of volume though  - c/w FS Q6h and moderate ISS    SKin/Lines  #Access  #Cellulitis   #DTI with skin sloughing  - L subclavian TLC inserted 2/25   - L radial artery line 2/25  - RIJ ECMO cannula 2/26 at 20 cm  - R fem ECMO cannula 2/26 at 25 cm  - candidal intertrigo +/- cellulitis of the pannus - clotrimazole cream 1% BID 2/26 -+ abx as per above  - DTI to right buttocks (sloughing of skin) and B/L feet   - wound consult placed     #Blisters  Blisters noted soles of b/l feet   - VA STEPHEN/PVR to evaluate for PAD  - podiatry consulted for worsening of b/l foot blisters- recs vascular consult  - Vascular consult placed- f/u recs  - VA duplex b/l LE ordered      GOC   - full code   - palliative involved  and following while on ECMO   - Mother, father involved in care are next of kin/surrogates and patient's girlfriend/partner is also involved but defers to parents

## 2025-03-07 NOTE — ASU DISCHARGE PLAN (ADULT/PEDIATRIC) - CARE PROVIDER_API CALL
Kevan Llanes  Thoracic Surgery  [address] [address] [place] [address], [address] [place] [address]  Phone: [phone]  Fax: [phone]  Follow Up Time: Routine

## 2025-03-08 LAB
ALBUMIN SERPL ELPH-MCNC: 3.1 G/DL — LOW (ref 3.3–5)
ALBUMIN SERPL ELPH-MCNC: 3.2 G/DL — LOW (ref 3.3–5)
ALBUMIN SERPL ELPH-MCNC: 3.2 G/DL — LOW (ref 3.3–5)
ALP SERPL-CCNC: 51 U/L — SIGNIFICANT CHANGE UP (ref 40–120)
ALP SERPL-CCNC: 52 U/L — SIGNIFICANT CHANGE UP (ref 40–120)
ALP SERPL-CCNC: 53 U/L — SIGNIFICANT CHANGE UP (ref 40–120)
ALT FLD-CCNC: 59 U/L — HIGH (ref 4–41)
ALT FLD-CCNC: 62 U/L — HIGH (ref 4–41)
ALT FLD-CCNC: 67 U/L — HIGH (ref 4–41)
ANION GAP SERPL CALC-SCNC: 10 MMOL/L — SIGNIFICANT CHANGE UP (ref 7–14)
ANION GAP SERPL CALC-SCNC: 10 MMOL/L — SIGNIFICANT CHANGE UP (ref 7–14)
ANION GAP SERPL CALC-SCNC: 12 MMOL/L — SIGNIFICANT CHANGE UP (ref 7–14)
ANISOCYTOSIS BLD QL: SLIGHT — SIGNIFICANT CHANGE UP
APTT BLD: 25.6 SEC — SIGNIFICANT CHANGE UP (ref 24.5–35.6)
APTT BLD: 27.7 SEC — SIGNIFICANT CHANGE UP (ref 24.5–35.6)
APTT BLD: 43.6 SEC — HIGH (ref 24.5–35.6)
APTT BLD: 46.8 SEC — HIGH (ref 24.5–35.6)
AST SERPL-CCNC: 28 U/L — SIGNIFICANT CHANGE UP (ref 4–40)
AST SERPL-CCNC: 31 U/L — SIGNIFICANT CHANGE UP (ref 4–40)
AST SERPL-CCNC: 34 U/L — SIGNIFICANT CHANGE UP (ref 4–40)
BASOPHILS # BLD AUTO: 0 K/UL — SIGNIFICANT CHANGE UP (ref 0–0.2)
BASOPHILS # BLD AUTO: 0 K/UL — SIGNIFICANT CHANGE UP (ref 0–0.2)
BASOPHILS # BLD AUTO: 0.01 K/UL — SIGNIFICANT CHANGE UP (ref 0–0.2)
BASOPHILS NFR BLD AUTO: 0 % — SIGNIFICANT CHANGE UP (ref 0–2)
BASOPHILS NFR BLD AUTO: 0 % — SIGNIFICANT CHANGE UP (ref 0–2)
BASOPHILS NFR BLD AUTO: 0.1 % — SIGNIFICANT CHANGE UP (ref 0–2)
BILIRUB SERPL-MCNC: 1.3 MG/DL — HIGH (ref 0.2–1.2)
BILIRUB SERPL-MCNC: 1.6 MG/DL — HIGH (ref 0.2–1.2)
BILIRUB SERPL-MCNC: 1.8 MG/DL — HIGH (ref 0.2–1.2)
BLOOD GAS ARTERIAL - LYTES,HGB,ICA,LACT RESULT: SIGNIFICANT CHANGE UP
BUN SERPL-MCNC: 30 MG/DL — HIGH (ref 7–23)
BUN SERPL-MCNC: 30 MG/DL — HIGH (ref 7–23)
BUN SERPL-MCNC: 31 MG/DL — HIGH (ref 7–23)
CA-I BLD-SCNC: 1.13 MMOL/L — LOW (ref 1.15–1.29)
CALCIUM SERPL-MCNC: 8.5 MG/DL — SIGNIFICANT CHANGE UP (ref 8.4–10.5)
CALCIUM SERPL-MCNC: 8.5 MG/DL — SIGNIFICANT CHANGE UP (ref 8.4–10.5)
CALCIUM SERPL-MCNC: 8.6 MG/DL — SIGNIFICANT CHANGE UP (ref 8.4–10.5)
CHLORIDE SERPL-SCNC: 103 MMOL/L — SIGNIFICANT CHANGE UP (ref 98–107)
CHLORIDE SERPL-SCNC: 104 MMOL/L — SIGNIFICANT CHANGE UP (ref 98–107)
CHLORIDE SERPL-SCNC: 106 MMOL/L — SIGNIFICANT CHANGE UP (ref 98–107)
CO2 SERPL-SCNC: 31 MMOL/L — SIGNIFICANT CHANGE UP (ref 22–31)
CO2 SERPL-SCNC: 31 MMOL/L — SIGNIFICANT CHANGE UP (ref 22–31)
CO2 SERPL-SCNC: 33 MMOL/L — HIGH (ref 22–31)
CREAT SERPL-MCNC: 0.79 MG/DL — SIGNIFICANT CHANGE UP (ref 0.5–1.3)
CREAT SERPL-MCNC: 0.87 MG/DL — SIGNIFICANT CHANGE UP (ref 0.5–1.3)
CREAT SERPL-MCNC: 0.89 MG/DL — SIGNIFICANT CHANGE UP (ref 0.5–1.3)
DACRYOCYTES BLD QL SMEAR: SLIGHT — SIGNIFICANT CHANGE UP
EGFR: 113 ML/MIN/1.73M2 — SIGNIFICANT CHANGE UP
EGFR: 113 ML/MIN/1.73M2 — SIGNIFICANT CHANGE UP
EGFR: 114 ML/MIN/1.73M2 — SIGNIFICANT CHANGE UP
EGFR: 114 ML/MIN/1.73M2 — SIGNIFICANT CHANGE UP
EGFR: 117 ML/MIN/1.73M2 — SIGNIFICANT CHANGE UP
EGFR: 117 ML/MIN/1.73M2 — SIGNIFICANT CHANGE UP
ELLIPTOCYTES BLD QL SMEAR: SLIGHT — SIGNIFICANT CHANGE UP
EOSINOPHIL # BLD AUTO: 0 K/UL — SIGNIFICANT CHANGE UP (ref 0–0.5)
EOSINOPHIL NFR BLD AUTO: 0 % — SIGNIFICANT CHANGE UP (ref 0–6)
GIANT PLATELETS BLD QL SMEAR: PRESENT — SIGNIFICANT CHANGE UP
GLUCOSE BLDC GLUCOMTR-MCNC: 135 MG/DL — HIGH (ref 70–99)
GLUCOSE BLDC GLUCOMTR-MCNC: 147 MG/DL — HIGH (ref 70–99)
GLUCOSE BLDC GLUCOMTR-MCNC: 162 MG/DL — HIGH (ref 70–99)
GLUCOSE SERPL-MCNC: 126 MG/DL — HIGH (ref 70–99)
GLUCOSE SERPL-MCNC: 155 MG/DL — HIGH (ref 70–99)
GLUCOSE SERPL-MCNC: 168 MG/DL — HIGH (ref 70–99)
HAPTOGLOB SERPL-MCNC: <20 MG/DL — LOW (ref 34–200)
HCT VFR BLD CALC: 35 % — LOW (ref 39–50)
HCT VFR BLD CALC: 37.1 % — LOW (ref 39–50)
HCT VFR BLD CALC: 37.5 % — LOW (ref 39–50)
HGB BLD-MCNC: 10 G/DL — LOW (ref 13–17)
HGB BLD-MCNC: 10.6 G/DL — LOW (ref 13–17)
HGB BLD-MCNC: 10.7 G/DL — LOW (ref 13–17)
HYPOCHROMIA BLD QL: SLIGHT — SIGNIFICANT CHANGE UP
IANC: 8.53 K/UL — HIGH (ref 1.8–7.4)
IANC: 8.74 K/UL — HIGH (ref 1.8–7.4)
IANC: 9.37 K/UL — HIGH (ref 1.8–7.4)
IMM GRANULOCYTES NFR BLD AUTO: 0.5 % — SIGNIFICANT CHANGE UP (ref 0–0.9)
IMM GRANULOCYTES NFR BLD AUTO: 0.7 % — SIGNIFICANT CHANGE UP (ref 0–0.9)
INR BLD: 1.14 RATIO — SIGNIFICANT CHANGE UP (ref 0.85–1.16)
INR BLD: 1.16 RATIO — SIGNIFICANT CHANGE UP (ref 0.85–1.16)
INR BLD: 1.65 RATIO — HIGH (ref 0.85–1.16)
LDH SERPL L TO P-CCNC: 518 U/L — HIGH (ref 135–225)
LYMPHOCYTES # BLD AUTO: 0.63 K/UL — LOW (ref 1–3.3)
LYMPHOCYTES # BLD AUTO: 0.63 K/UL — LOW (ref 1–3.3)
LYMPHOCYTES # BLD AUTO: 1.36 K/UL — SIGNIFICANT CHANGE UP (ref 1–3.3)
LYMPHOCYTES # BLD AUTO: 10.8 % — LOW (ref 13–44)
LYMPHOCYTES # BLD AUTO: 5.6 % — LOW (ref 13–44)
LYMPHOCYTES # BLD AUTO: 6 % — LOW (ref 13–44)
MACROCYTES BLD QL: SLIGHT — SIGNIFICANT CHANGE UP
MAGNESIUM SERPL-MCNC: 1.9 MG/DL — SIGNIFICANT CHANGE UP (ref 1.6–2.6)
MAGNESIUM SERPL-MCNC: 1.9 MG/DL — SIGNIFICANT CHANGE UP (ref 1.6–2.6)
MAGNESIUM SERPL-MCNC: 2 MG/DL — SIGNIFICANT CHANGE UP (ref 1.6–2.6)
MCHC RBC-ENTMCNC: 23 PG — LOW (ref 27–34)
MCHC RBC-ENTMCNC: 23.2 PG — LOW (ref 27–34)
MCHC RBC-ENTMCNC: 23.4 PG — LOW (ref 27–34)
MCHC RBC-ENTMCNC: 28.5 G/DL — LOW (ref 32–36)
MCHC RBC-ENTMCNC: 28.6 G/DL — LOW (ref 32–36)
MCHC RBC-ENTMCNC: 28.6 G/DL — LOW (ref 32–36)
MCV RBC AUTO: 80.6 FL — SIGNIFICANT CHANGE UP (ref 80–100)
MCV RBC AUTO: 81.3 FL — SIGNIFICANT CHANGE UP (ref 80–100)
MCV RBC AUTO: 81.9 FL — SIGNIFICANT CHANGE UP (ref 80–100)
MICROCYTES BLD QL: SLIGHT — SIGNIFICANT CHANGE UP
MONOCYTES # BLD AUTO: 1.02 K/UL — HIGH (ref 0–0.9)
MONOCYTES # BLD AUTO: 1.07 K/UL — HIGH (ref 0–0.9)
MONOCYTES # BLD AUTO: 1.23 K/UL — HIGH (ref 0–0.9)
MONOCYTES NFR BLD AUTO: 10.2 % — SIGNIFICANT CHANGE UP (ref 2–14)
MONOCYTES NFR BLD AUTO: 10.9 % — SIGNIFICANT CHANGE UP (ref 2–14)
MONOCYTES NFR BLD AUTO: 8.1 % — SIGNIFICANT CHANGE UP (ref 2–14)
NEUTROPHILS # BLD AUTO: 8.74 K/UL — HIGH (ref 1.8–7.4)
NEUTROPHILS # BLD AUTO: 9.37 K/UL — HIGH (ref 1.8–7.4)
NEUTROPHILS # BLD AUTO: 9.61 K/UL — HIGH (ref 1.8–7.4)
NEUTROPHILS NFR BLD AUTO: 76.6 % — SIGNIFICANT CHANGE UP (ref 43–77)
NEUTROPHILS NFR BLD AUTO: 82.9 % — HIGH (ref 43–77)
NEUTROPHILS NFR BLD AUTO: 83.1 % — HIGH (ref 43–77)
NRBC # BLD AUTO: 0 K/UL — SIGNIFICANT CHANGE UP (ref 0–0)
NRBC # BLD AUTO: 0 K/UL — SIGNIFICANT CHANGE UP (ref 0–0)
NRBC # FLD: 0 K/UL — SIGNIFICANT CHANGE UP (ref 0–0)
NRBC # FLD: 0 K/UL — SIGNIFICANT CHANGE UP (ref 0–0)
NRBC BLD AUTO-RTO: 0 /100 WBCS — SIGNIFICANT CHANGE UP (ref 0–0)
NRBC BLD AUTO-RTO: 0 /100 WBCS — SIGNIFICANT CHANGE UP (ref 0–0)
PHOSPHATE SERPL-MCNC: 3.4 MG/DL — SIGNIFICANT CHANGE UP (ref 2.5–4.5)
PHOSPHATE SERPL-MCNC: 3.8 MG/DL — SIGNIFICANT CHANGE UP (ref 2.5–4.5)
PHOSPHATE SERPL-MCNC: 4.6 MG/DL — HIGH (ref 2.5–4.5)
PLAT MORPH BLD: NORMAL — SIGNIFICANT CHANGE UP
PLATELET # BLD AUTO: 100 K/UL — LOW (ref 150–400)
PLATELET # BLD AUTO: 106 K/UL — LOW (ref 150–400)
PLATELET # BLD AUTO: 109 K/UL — LOW (ref 150–400)
PLATELET COUNT - ESTIMATE: ABNORMAL
POIKILOCYTOSIS BLD QL AUTO: SIGNIFICANT CHANGE UP
POLYCHROMASIA BLD QL SMEAR: SIGNIFICANT CHANGE UP
POTASSIUM SERPL-MCNC: 3.8 MMOL/L — SIGNIFICANT CHANGE UP (ref 3.5–5.3)
POTASSIUM SERPL-MCNC: 3.8 MMOL/L — SIGNIFICANT CHANGE UP (ref 3.5–5.3)
POTASSIUM SERPL-MCNC: 3.9 MMOL/L — SIGNIFICANT CHANGE UP (ref 3.5–5.3)
POTASSIUM SERPL-SCNC: 3.8 MMOL/L — SIGNIFICANT CHANGE UP (ref 3.5–5.3)
POTASSIUM SERPL-SCNC: 3.8 MMOL/L — SIGNIFICANT CHANGE UP (ref 3.5–5.3)
POTASSIUM SERPL-SCNC: 3.9 MMOL/L — SIGNIFICANT CHANGE UP (ref 3.5–5.3)
PROT SERPL-MCNC: 6.3 G/DL — SIGNIFICANT CHANGE UP (ref 6–8.3)
PROT SERPL-MCNC: 6.5 G/DL — SIGNIFICANT CHANGE UP (ref 6–8.3)
PROT SERPL-MCNC: 6.6 G/DL — SIGNIFICANT CHANGE UP (ref 6–8.3)
PROTHROM AB SERPL-ACNC: 13.4 SEC — SIGNIFICANT CHANGE UP (ref 9.9–13.4)
PROTHROM AB SERPL-ACNC: 13.5 SEC — HIGH (ref 9.9–13.4)
PROTHROM AB SERPL-ACNC: 19.1 SEC — HIGH (ref 9.9–13.4)
RBC # BLD: 4.34 M/UL — SIGNIFICANT CHANGE UP (ref 4.2–5.8)
RBC # BLD: 4.53 M/UL — SIGNIFICANT CHANGE UP (ref 4.2–5.8)
RBC # BLD: 4.61 M/UL — SIGNIFICANT CHANGE UP (ref 4.2–5.8)
RBC # FLD: 21.3 % — HIGH (ref 10.3–14.5)
RBC # FLD: 21.4 % — HIGH (ref 10.3–14.5)
RBC # FLD: 21.7 % — HIGH (ref 10.3–14.5)
RBC BLD AUTO: ABNORMAL
SCHISTOCYTES BLD QL AUTO: SLIGHT — SIGNIFICANT CHANGE UP
SODIUM SERPL-SCNC: 146 MMOL/L — HIGH (ref 135–145)
SODIUM SERPL-SCNC: 147 MMOL/L — HIGH (ref 135–145)
SODIUM SERPL-SCNC: 147 MMOL/L — HIGH (ref 135–145)
STOMATOCYTES BLD QL SMEAR: SIGNIFICANT CHANGE UP
TARGETS BLD QL SMEAR: SLIGHT — SIGNIFICANT CHANGE UP
TRIGL SERPL-MCNC: 169 MG/DL — HIGH
VARIANT LYMPHS # BLD: 4.5 % — SIGNIFICANT CHANGE UP (ref 0–6)
VARIANT LYMPHS NFR BLD MANUAL: 4.5 % — SIGNIFICANT CHANGE UP (ref 0–6)
WBC # BLD: 10.51 K/UL — HIGH (ref 3.8–10.5)
WBC # BLD: 11.3 K/UL — HIGH (ref 3.8–10.5)
WBC # BLD: 12.55 K/UL — HIGH (ref 3.8–10.5)
WBC # FLD AUTO: 10.51 K/UL — HIGH (ref 3.8–10.5)
WBC # FLD AUTO: 11.3 K/UL — HIGH (ref 3.8–10.5)
WBC # FLD AUTO: 12.55 K/UL — HIGH (ref 3.8–10.5)

## 2025-03-08 PROCEDURE — 99291 CRITICAL CARE FIRST HOUR: CPT

## 2025-03-08 RX ORDER — LISINOPRIL 5 MG/1
5 TABLET ORAL EVERY 24 HOURS
Refills: 0 | Status: DISCONTINUED | OUTPATIENT
Start: 2025-03-08 | End: 2025-03-16

## 2025-03-08 RX ORDER — DEXMEDETOMIDINE HYDROCHLORIDE IN SODIUM CHLORIDE 4 UG/ML
1.5 INJECTION INTRAVENOUS
Qty: 400 | Refills: 0 | Status: DISCONTINUED | OUTPATIENT
Start: 2025-03-08 | End: 2025-03-10

## 2025-03-08 RX ORDER — ARGATROBAN 100 MG/ML
1.7 INJECTION, SOLUTION INTRAVENOUS
Qty: 250 | Refills: 0 | Status: DISCONTINUED | OUTPATIENT
Start: 2025-03-08 | End: 2025-03-11

## 2025-03-08 RX ORDER — ACETAMINOPHEN 500 MG/5ML
1000 LIQUID (ML) ORAL ONCE
Refills: 0 | Status: COMPLETED | OUTPATIENT
Start: 2025-03-08 | End: 2025-03-08

## 2025-03-08 RX ORDER — QUETIAPINE FUMARATE 25 MG/1
25 TABLET ORAL
Refills: 0 | Status: DISCONTINUED | OUTPATIENT
Start: 2025-03-08 | End: 2025-03-09

## 2025-03-08 RX ORDER — AMLODIPINE BESYLATE 10 MG/1
5 TABLET ORAL DAILY
Refills: 0 | Status: DISCONTINUED | OUTPATIENT
Start: 2025-03-08 | End: 2025-03-11

## 2025-03-08 RX ORDER — FUROSEMIDE 10 MG/ML
40 INJECTION INTRAMUSCULAR; INTRAVENOUS EVERY 6 HOURS
Refills: 0 | Status: DISCONTINUED | OUTPATIENT
Start: 2025-03-08 | End: 2025-03-09

## 2025-03-08 RX ORDER — HYDROMORPHONE/SOD CHLOR,ISO/PF 2 MG/10 ML
0.8 SYRINGE (ML) INJECTION
Qty: 100 | Refills: 0 | Status: DISCONTINUED | OUTPATIENT
Start: 2025-03-08 | End: 2025-03-10

## 2025-03-08 RX ORDER — PROPOFOL 10 MG/ML
25 INJECTION, EMULSION INTRAVENOUS
Qty: 1000 | Refills: 0 | Status: DISCONTINUED | OUTPATIENT
Start: 2025-03-08 | End: 2025-03-09

## 2025-03-08 RX ADMIN — PROPOFOL 36.1 MICROGRAM(S)/KG/MIN: 10 INJECTION, EMULSION INTRAVENOUS at 19:50

## 2025-03-08 RX ADMIN — Medication 1 APPLICATION(S): at 18:07

## 2025-03-08 RX ADMIN — FUROSEMIDE 40 MILLIGRAM(S): 10 INJECTION INTRAMUSCULAR; INTRAVENOUS at 11:45

## 2025-03-08 RX ADMIN — Medication 1000 MILLIGRAM(S): at 13:00

## 2025-03-08 RX ADMIN — Medication 500000 UNIT(S): at 05:33

## 2025-03-08 RX ADMIN — Medication 5 MILLILITER(S): at 22:07

## 2025-03-08 RX ADMIN — DEXAMETHASONE 102 MILLIGRAM(S): 0.5 TABLET ORAL at 05:32

## 2025-03-08 RX ADMIN — CLOTRIMAZOLE 1 APPLICATION(S): 1 CREAM TOPICAL at 18:23

## 2025-03-08 RX ADMIN — CLOTRIMAZOLE 1 APPLICATION(S): 1 CREAM TOPICAL at 05:32

## 2025-03-08 RX ADMIN — INSULIN LISPRO 2: 100 INJECTION, SOLUTION INTRAVENOUS; SUBCUTANEOUS at 11:47

## 2025-03-08 RX ADMIN — Medication 15 MILLILITER(S): at 11:41

## 2025-03-08 RX ADMIN — IPRATROPIUM BROMIDE AND ALBUTEROL SULFATE 3 MILLILITER(S): .5; 2.5 SOLUTION RESPIRATORY (INHALATION) at 08:40

## 2025-03-08 RX ADMIN — Medication 400 MILLIGRAM(S): at 12:00

## 2025-03-08 RX ADMIN — OLANZAPINE 5 MILLIGRAM(S): 10 TABLET ORAL at 05:32

## 2025-03-08 RX ADMIN — Medication 4 MILLILITER(S): at 02:53

## 2025-03-08 RX ADMIN — Medication 1 MG/HR: at 08:24

## 2025-03-08 RX ADMIN — Medication 15 MILLILITER(S): at 05:31

## 2025-03-08 RX ADMIN — Medication 400 MILLIGRAM(S): at 18:15

## 2025-03-08 RX ADMIN — FUROSEMIDE 40 MILLIGRAM(S): 10 INJECTION INTRAMUSCULAR; INTRAVENOUS at 05:31

## 2025-03-08 RX ADMIN — Medication 100 MILLIEQUIVALENT(S): at 05:31

## 2025-03-08 RX ADMIN — Medication 1.25 MILLIGRAM(S): at 11:43

## 2025-03-08 RX ADMIN — Medication 15 MILLILITER(S): at 18:07

## 2025-03-08 RX ADMIN — POLYETHYLENE GLYCOL 3350 17 GRAM(S): 17 POWDER, FOR SOLUTION ORAL at 11:42

## 2025-03-08 RX ADMIN — Medication 1 APPLICATION(S): at 05:32

## 2025-03-08 RX ADMIN — ARGATROBAN 11.6 MICROGRAM(S)/KG/MIN: 100 INJECTION, SOLUTION INTRAVENOUS at 16:33

## 2025-03-08 RX ADMIN — IPRATROPIUM BROMIDE AND ALBUTEROL SULFATE 3 MILLILITER(S): .5; 2.5 SOLUTION RESPIRATORY (INHALATION) at 20:58

## 2025-03-08 RX ADMIN — DEXMEDETOMIDINE HYDROCHLORIDE IN SODIUM CHLORIDE 60.2 MICROGRAM(S)/KG/HR: 4 INJECTION INTRAVENOUS at 08:24

## 2025-03-08 RX ADMIN — LISINOPRIL 5 MILLIGRAM(S): 5 TABLET ORAL at 11:42

## 2025-03-08 RX ADMIN — Medication 40 MILLIGRAM(S): at 11:44

## 2025-03-08 RX ADMIN — Medication 4 MILLILITER(S): at 15:32

## 2025-03-08 RX ADMIN — Medication 1 APPLICATION(S): at 05:27

## 2025-03-08 RX ADMIN — IPRATROPIUM BROMIDE AND ALBUTEROL SULFATE 3 MILLILITER(S): .5; 2.5 SOLUTION RESPIRATORY (INHALATION) at 15:32

## 2025-03-08 RX ADMIN — Medication 500000 UNIT(S): at 18:07

## 2025-03-08 RX ADMIN — Medication 4 MILLILITER(S): at 20:57

## 2025-03-08 RX ADMIN — PROPOFOL 36.1 MICROGRAM(S)/KG/MIN: 10 INJECTION, EMULSION INTRAVENOUS at 08:24

## 2025-03-08 RX ADMIN — ARGATROBAN 11.6 MICROGRAM(S)/KG/MIN: 100 INJECTION, SOLUTION INTRAVENOUS at 19:49

## 2025-03-08 RX ADMIN — Medication 1.25 MILLIGRAM(S): at 05:33

## 2025-03-08 RX ADMIN — FUROSEMIDE 40 MILLIGRAM(S): 10 INJECTION INTRAMUSCULAR; INTRAVENOUS at 18:07

## 2025-03-08 RX ADMIN — Medication 500000 UNIT(S): at 11:42

## 2025-03-08 RX ADMIN — Medication 0.8 MG/HR: at 19:50

## 2025-03-08 RX ADMIN — Medication 25 GRAM(S): at 05:33

## 2025-03-08 RX ADMIN — Medication 25 GRAM(S): at 22:07

## 2025-03-08 RX ADMIN — Medication 1000 MILLIGRAM(S): at 19:07

## 2025-03-08 RX ADMIN — QUETIAPINE FUMARATE 25 MILLIGRAM(S): 25 TABLET ORAL at 18:07

## 2025-03-08 RX ADMIN — IPRATROPIUM BROMIDE AND ALBUTEROL SULFATE 3 MILLILITER(S): .5; 2.5 SOLUTION RESPIRATORY (INHALATION) at 02:53

## 2025-03-08 RX ADMIN — DEXMEDETOMIDINE HYDROCHLORIDE IN SODIUM CHLORIDE 90.3 MICROGRAM(S)/KG/HR: 4 INJECTION INTRAVENOUS at 19:50

## 2025-03-08 RX ADMIN — Medication 1000 MILLIGRAM(S): at 05:00

## 2025-03-08 RX ADMIN — AMLODIPINE BESYLATE 5 MILLIGRAM(S): 10 TABLET ORAL at 18:15

## 2025-03-08 RX ADMIN — Medication 400 MILLIGRAM(S): at 04:24

## 2025-03-08 RX ADMIN — Medication 25 GRAM(S): at 13:09

## 2025-03-08 RX ADMIN — Medication 4 MILLILITER(S): at 08:40

## 2025-03-08 NOTE — PROGRESS NOTE ADULT - SUBJECTIVE AND OBJECTIVE BOX
Thoracic Surgery Progress Note    S: Patient seen and examined. No acute events overnight.     O:  Physical Exam:  Gen: Laying in bed, NAD, intubated  HEENT: RIJ site clean, flat.   Resp: intubated  Abd: soft, NT, ND, PEG at 5cm at the skin  Ext: R Fem site clean dry, flat.    Vital Signs Last 24 Hrs  T(C): 37.7 (08 Mar 2025 04:00), Max: 38 (08 Mar 2025 03:00)  T(F): 99.9 (08 Mar 2025 04:00), Max: 100.4 (08 Mar 2025 03:00)  HR: 75 (08 Mar 2025 07:33) (66 - 104)  BP: --  BP(mean): --  RR: 14 (08 Mar 2025 07:00) (11 - 17)  SpO2: 99% (08 Mar 2025 07:33) (86% - 100%)    Parameters below as of 08 Mar 2025 07:00  Patient On (Oxygen Delivery Method): ventilator    O2 Concentration (%): 40    I&O's Detail    07 Mar 2025 07:01  -  08 Mar 2025 07:00  --------------------------------------------------------  IN:    Albumin 5%  - 250 mL: 350 mL    Dexmedetomidine: 1535.1 mL    Furosemide: 5 mL    Furosemide: 120 mL    HYRDOmorphone: 19.6 mL    IV PiggyBack: 550 mL    NiCARdipine: 190 mL    Platelets - Single Donor: 230 mL    Propofol: 1531 mL  Total IN: 4530.7 mL    OUT:    Drain (mL): 550 mL    Indwelling Catheter - Urethral (mL): 9425 mL  Total OUT: 9975 mL    Total NET: -5444.3 mL                                10.7   11.30 )-----------( 100      ( 08 Mar 2025 00:18 )             37.5       03-08    146[H]  |  103  |  31[H]  ----------------------------<  155[H]  3.8   |  31  |  0.79    Ca    8.5      08 Mar 2025 00:18  Phos  4.6     03-08  Mg     1.90     03-08    TPro  6.5  /  Alb  3.2[L]  /  TBili  1.3[H]  /  DBili  x   /  AST  31  /  ALT  67[H]  /  AlkPhos  53  03-08

## 2025-03-08 NOTE — PROGRESS NOTE ADULT - ASSESSMENT
39 M,  Hx obesity, VV ecmo now decanulated and trach and peg.     Plan  - Suture removal in 2 weeks  - Can start TF today    Gatito Savage MD, PGY6  Thoracic Surgery

## 2025-03-08 NOTE — PROGRESS NOTE ADULT - SUBJECTIVE AND OBJECTIVE BOX
Interval Events:  -lasix gtt switched to standing   -remained off cardene gtt     HPI:  40 yo M w/ class III obesity, pAfib (no AC), HTN, BEA (on CPAP), history of b/l papilledema attributed to pseudotumor cerebri s/p LP in 2024 with very high opening pressure, who is transferred for VV ECMO for ARDS and severe hypoxia. Patient initially presented to [place] on 2/24 for SOB and b/l LE edema. The patient's family endorses that he has had progressive leg swelling shortness of breath, dyspnea, and deconditioning for the past several months and had to take disability from his job at the [id number]. He is a current every day smoker of [employer] and marijuana, but does not drink or do other drugs. Currently lives with his mother. There is a long term partner in his life, but they are not , and they have an on/off relationship currently not very involved with each other as per the patient's mother and aunt.  At [facility] where he was getting an eval last year for shortness of breath, he had a sleep study and was diagnosed with sleep apnea, prescribed a PAP machine, which he has in his room but the mother is not sure how many nights per week he uses it. Recently, the last day or two, his mother and brother have been under the weather with URIs and the patient 2 days ago started to develop a ruynny nose, ough, some wheezing of the chest, as well as worsening shortness of breath and fevers at home. He called his aunt who told him to go get checked out and then he landed at the [place] ED. Patient found to be reportedly hypoxemic to 70% on RA with worsening respiratory status/secretions and somnolence in the ED. Patient was intubated with difficulty (7 attempts) due to very large tongue secretions. Despite optimal vent management, patient remained hypoxemic. Unable to prone due to obesity. Patient cannulated for VV ECMO on 2/25 and transferred to [facility] for further management.     [place] labs notable for MRSA PCR -, Fluvid -, urine legionella -, sputum gram stain  with GPC and GPR    CTA chest on 2/24 negative for pulmonary embolism, notable for patchy bilateral lower lobe opacities, hepatomegaly, mild ascites, edema/induration of the abdominal pannus.    LE duplex on 2/25 negative for DVT    TTE on 2/25 showed LV EF 61%, enlarged RV with TAPSE 2.1cm, ePASP at least 49 (Patient had 10/2024 TTE with normal LV and RV with ePASP 14).    Only home med is Lasix. Not on acetazolamide for pseudotumor or on Wegovy (was pending auth) (26 Feb 2025 01:48)      REVIEW OF SYSTEMS:    [x ] Unable to assess ROS because patient is intubated/sedated    OBJECTIVE:  ICU Vital Signs Last 24 Hrs  T(C): 37.7 (08 Mar 2025 04:00), Max: 38 (08 Mar 2025 03:00)  T(F): 99.9 (08 Mar 2025 04:00), Max: 100.4 (08 Mar 2025 03:00)  HR: 82 (08 Mar 2025 06:00) (62 - 104)  BP: --  BP(mean): --  ABP: 131/54 (08 Mar 2025 06:00) (114/53 - 159/75)  ABP(mean): 73 (08 Mar 2025 06:00) (64 - 247)  RR: 14 (08 Mar 2025 06:00) (11 - 17)  SpO2: 98% (08 Mar 2025 06:00) (86% - 100%)    O2 Parameters below as of 08 Mar 2025 06:00  Patient On (Oxygen Delivery Method): ventilator    O2 Concentration (%): 40      Mode: AC/ CMV (Assist Control/ Continuous Mandatory Ventilation), RR (machine): 14, FiO2: 40, PEEP: 22, ITime: 1, MAP: 26, PC: 12, PIP: 34    03-06 @ 07:01  -  03-07 @ 07:00  --------------------------------------------------------  IN: 6428.4 mL / OUT: 7840 mL / NET: -1411.6 mL    03-07 @ 07:01  -  03-08 @ 06:11  --------------------------------------------------------  IN: 4367.4 mL / OUT: 9565 mL / NET: -5197.6 mL      CAPILLARY BLOOD GLUCOSE      POCT Blood Glucose.: 135 mg/dL (08 Mar 2025 05:30)    Physical Exam:   Constitutional: ill appearing, no acute distress   HEENT: + PERRLA, EOMI, no drainage or redness  Neck: supple,  No JVD, Right IJ ecmo cannula in place   Respiratory: Ventilator assisted breath Sounds diminished bilaterally to auscultation, no accessory muscle use noted  Cardiovascular: Regular rate, regular rhythm, normal S1, S2; no murmurs or rub  Gastrointestinal: Obese, soft, non distended, no hepatosplenomegaly, normal bowel sounds  Extremities: + 2 peripheral edema, no cyanosis, no clubbing   Vascular: Equal and normal pulses: 2+ peripheral pulses throughout  Neurological: sedated/intubated  Musculoskeletal: No joint swelling or deformity; no limitation of movement  Skin: warm, dry, well perfused, cellulitis to pannus area, DTI to right buttocks, plantar blisters b/l feet      HOSPITAL MEDICATIONS:  Standing Meds:  albuterol/ipratropium for Nebulization 3 milliLiter(s) Nebulizer every 6 hours  chlorhexidine 0.12% Liquid 15 milliLiter(s) Oral Mucosa every 12 hours  chlorhexidine 2% Cloths 1 Application(s) Topical <User Schedule>  clotrimazole 1% Cream 1 Application(s) Topical two times a day  dexMEDEtomidine Infusion 1 MICROgram(s)/kG/Hr IV Continuous <Continuous>  dextrose 50% Injectable 25 Gram(s) IV Push once  dextrose 50% Injectable 12.5 Gram(s) IV Push once  dextrose 50% Injectable 25 Gram(s) IV Push once  enalaprilat Injectable 1.25 milliGRAM(s) IV Push every 6 hours  furosemide   Injectable 40 milliGRAM(s) IV Push every 6 hours  glucagon  Injectable 1 milliGRAM(s) IntraMuscular once  hydrALAZINE 50 milliGRAM(s) Oral every 8 hours  HYDROmorphone Infusion. 1 mG/Hr IV Continuous <Continuous>  insulin lispro (ADMELOG) corrective regimen sliding scale   SubCutaneous every 6 hours  multivitamin/minerals/iron Oral Solution (CENTRUM) 15 milliLiter(s) Oral daily  naloxegol 12.5 milliGRAM(s) Oral daily  nystatin    Suspension 035447 Unit(s) Oral every 6 hours  pantoprazole  Injectable 40 milliGRAM(s) IV Push daily  petrolatum Ophthalmic Ointment 1 Application(s) Both EYES every 12 hours  piperacillin/tazobactam IVPB.. 4.5 Gram(s) IV Intermittent every 8 hours  polyethylene glycol 3350 17 Gram(s) Oral daily  propofol Infusion 25 MICROgram(s)/kG/Min IV Continuous <Continuous>  QUEtiapine 50 milliGRAM(s) Oral <User Schedule>  senna Syrup 5 milliLiter(s) Oral at bedtime  sodium chloride 3%  Inhalation 4 milliLiter(s) Inhalation every 6 hours      PRN Meds:      LABS:                        10.7   11.30 )-----------( 100      ( 08 Mar 2025 00:18 )             37.5     Hgb Trend: 10.7<--, 10.5<--, 10.5<--, 10.1<--, 10.3<--  03-08    146[H]  |  103  |  31[H]  ----------------------------<  155[H]  3.8   |  31  |  0.79    Ca    8.5      08 Mar 2025 00:18  Phos  4.6     03-08  Mg     1.90     03-08    TPro  6.5  /  Alb  3.2[L]  /  TBili  1.3[H]  /  DBili  x   /  AST  31  /  ALT  67[H]  /  AlkPhos  53  03-08    Creatinine Trend: 0.79<--, 0.78<--, 0.76<--, 0.78<--, 0.80<--, 0.74<--  PT/INR - ( 08 Mar 2025 00:18 )   PT: 13.5 sec;   INR: 1.14 ratio         PTT - ( 08 Mar 2025 00:18 )  PTT:27.7 sec  Urinalysis Basic - ( 08 Mar 2025 00:18 )    Color: x / Appearance: x / SG: x / pH: x  Gluc: 155 mg/dL / Ketone: x  / Bili: x / Urobili: x   Blood: x / Protein: x / Nitrite: x   Leuk Esterase: x / RBC: x / WBC x   Sq Epi: x / Non Sq Epi: x / Bacteria: x      Arterial Blood Gas:  03-08 @ 00:18  7.42/60/90/39/97.6/12.3  ABG lactate: --  Arterial Blood Gas:  03-07 @ 17:15  7.39/58/121/35/99.9/8.5  ABG lactate: --  Arterial Blood Gas:  03-07 @ 15:10  7.40/54/116/33/98.2/7.3  ABG lactate: --  Arterial Blood Gas:  03-07 @ 11:10  7.36/60/93/34/97.3/6.9  ABG lactate: --  Arterial Blood Gas:  03-07 @ 06:00  7.40/55/126/34/99.3/7.8  ABG lactate: --  Arterial Blood Gas:  03-06 @ 22:32  7.41/53/150/34/99.7/7.7  ABG lactate: --  Arterial Blood Gas:  03-06 @ 16:10  7.40/54/122/33/98.7/7.3  ABG lactate: --  Arterial Blood Gas:  03-06 @ 09:55  7.44/52/104/35/98.4/9.6  ABG lactate: --        MICROBIOLOGY:     Culture - Fungal, Bronchial (collected 05 Mar 2025 10:42)  Source: Bronchial Bronchial Lavage  Preliminary Report (07 Mar 2025 12:03):    No growth    Culture - Acid Fast - Bronchial w/Smear (collected 05 Mar 2025 10:42)  Source: Bronchial Bronchial Lavage    Culture - Bronchial (collected 05 Mar 2025 10:42)  Source: Bronchial Bronchial Lavage  Gram Stain (05 Mar 2025 20:26):    Rare polymorphonuclear leukocytes per low power field    No squamous epithelial cells per low power field    No organisms seen per oil power field  Final Report (07 Mar 2025 08:25):    No growth

## 2025-03-08 NOTE — PROGRESS NOTE ADULT - ASSESSMENT
38 yo M w/ class III obesity, pAfib (no AC), HTN, BEA (on CPAP), history of b/l papilledema attributed to pseudotumor cerebri, who is transferred from [facility] on 2/26 for VV ECMO 2/2 ARDS. Patient initially presented to [facility] on 2/24 for SOB and b/l LE edema. As per chart review, patient endorses some degree of SOB and b/l LE edema x 3 months with significant weight gain. As per ICU team, patient had positive sick contacts with viral URI 1 to 2 weeks PTA and since then has had worsening SOB. Patient found to be reportedly hypoxemic to 70% on RA with worsening respiratory status/secretions and somnolence in the ED. Patient was a difficult intubation (~6 attempts). Despite optimal vent management, patient remained hypoxemic. Unable to prone due to obesity. Patient cannulated for VV ECMO on 2/25 and transferred to [facility] for further management.     Neuro  #Mental status   #pseudotumor cerebri  - history of pseudotumor cerebri s/p LP under fluoro in 2024 with high opening pressure with MRI 2024 also showed possible pituitary mass but unclear because of pressure effect from pseudotumor cerebri causing partially empty sella. Considering to start acetazolamide outpatient but never started  - prolactin high (30), defer checking cortisol axis given already received steroids but should be investigated, ACTH (<1.5) low but should be suppressed iso steroids  - concern for pituitary adenoma, however TSH .79 normal,  fT4 0.9 normal, and low T3 67  - sedated with Dilaudid, low dose propofol, and Precedex gtt wean as tolerated/indicated  - with lowered sedation pt awake, EASLEY, able to follow commands, however became restless pulling at lines and ETT  - Seroquel increased to 50mg BID for agitation/ICU delirium -  monitor QTc - PO meds held iso new PEG placement, can resume x24 hrs  - zyprexa 5mg q 8 x24 hrs for agitation and to assist with sedation wean while kept NPO   - paralytics d/c'd 2/27   - PT/OT following     Cardiovascular  #HTN   #Atrial fibrillation   - Hx of a. fib not on AC   - echo from 2024 did not demonstrate elevation of pulmonary pressures, but now with severe PH suggesting chronicity on TTE at    - TTE on 2/25 showed LV EF 61%, enlarged RV with TAPSE 2.1cm, ePASP at least 49 (Patient had 10/2024 TTE with normal LV and RV with ePASP 14).  - ROBERT 2/26 showing VTI 17  - ROBERT 3/1 showing mild TR, enlarged RV with normal LV/RV function. PEEP increased to 24 without signs of RV dilation  - troponins initially elevated, peaked at 162 however downtrended likely from RV dilation and strain - EKG neg for any typical ischemia  - s/p esmolol drip for high flows now D/C'd may need to consider again if need for higher flows  - c/w hydralazine 50mg 8 hrs for HTN and titrate as needed- PO meds held iso new PEG placement, can give IVP for now and resume PO x24 hrs  - enalaprilat 1.25mg IV q6 hrs added for persistent hypertension and to assist weaning off Cardene gtt which has been utilized prn, can switch to PO x24hrs given new PEG placement  - ECG daily to monitor QTc  as has been on higher end, currently acceptable    Pulmonary  #Pulmonary HTN  #ARDS  #Multifocal Pneumonia   #BEA   - history of diagnosed BEA/?OHS intermittent compliance to home PAP  - c/f acute on suspected chronic pulmonary hypertension in setting of possible BEA/OHS c/b pneumonia, ARDS, and pulmonary edema   - CTA chest on 2/24 negative for pulmonary embolism, notable for patchy bilateral lower lobe opacities, hepatomegaly, mild ascites, edema/induration of the abdominal pannus.  - Full RVP and BAL cultures negative    - Bronchoscopy demonstrated copious purulent secretions 2/26 and 2/27  - Bronchoscopy 3/1 noted with small amount of old, dried blood at R and L mainstem, not occluding airway  - Bronchoscopy 3/5 with evidence of bleeding in the LLL and the distal trachea/RMSB lateral aspect of the bifurcation. BAL ngtd  - s/p Dexamethasone 20mg x 5 days, now on 10 mg x5 days -> taper on day 11 by 2 mg every 2 days  - C/w duoNeb q6 hours + 3% saline - stop IPV due to ET tube migration and minimal secretion burden  - c/w PC/AC PIP:32  RR 14 PC: 12 PEEP:20 Fio2 40% ~500-900ml  - difficult glottic visualization due to large tongue upon intubation at [facility] 2/25 but able to visualize here easily with S4 video laryngoscope  - S/p Flex bronchoscopy tracheostomy #6 portex proximal XLT, placed 3/7 by CT surgery in OR      Gastrointestinal  #Diet   #Transaminitis - resolving  - tolerated tube feeds via NGT  - now s/p Endoscopic percutaneous gastrostomy LUQ PEG 3/7, will place to gravity and hold feeds/meds x24 hours  - elevated Tbili likely iso hemolysis 2/2 high ECMO flows, mild transaminitis now improving  - hepatomegaly and mild ascites noted on CT A/P - no pocket to tap  - RUQ ultrasound with steatosis    - c/w bowel regimen with Senna, Miralax and Movantik - having BMs adequately from 3/5 with aggressive regimen -PO meds held iso new PEG placement, can resume x24 hrs  - titrate bowel regimen as per BMs  - triglycerides have been low  - nutrition following       Gu/Renal  #ELLA  #Hypernatremia   #Non-AG Metabolic Alkalosis  - ELLA - improved, likely ATN/vascular congestion  - good UO, overall net negative  - c/w free h20 250ml q6 hr for hypernatremia - held today given NPO iso new PEG placement, can resume x24 hrs if needed  - s/p intermittent diuresis, started lasix gtt 3/2 to assist with  - contraction alkalosis likely 2/2 diuretics, given albumin 250ml x1, acetazolamide 500mg, lasix gtt discontinued 3/4   -c/w Lasix 40mg q6 s/p lasix gtt infusion, goal overall net negative   - monitor urine and electrolytes closely given aggressive diuresis      Infectious disease  #leukocytosis  #Pneumonia   #cellulitis   - Leukocytosis now likely iso steroids, remains afebrile  - s/p cefepime, linezolid, and aztihro 2/25-2/26   - s/p vanco (2/26-3/1) and c/w zosyn for a total of ~10 days (2/26-3/8) to cover cellulitis/pneumonia - will check CT chest some time after tracheostomy and decannulation to assess progression of pneumonia may extend course as there is some c/f poss clinically stable necrotizing pneumonia - total abx course duration tbd  - full RVP negative  - BCx x 2 2/24 and 2/26 currently NGTD, UCx 2/25 NGTD, tracheal aspirate Cx from 2/25 with normal commensal kyle   - strep pneumo Ag and urine legionella negative   - s/p Mupiricon b/l nares for +MSSA  (2/26-3/3)  - BAL cultures 3/1 currently NGTD, previous BAL 2/26 NGTD  - follow up BAL sent 3/5 ngtd  - ?penile meatus yellow discharge resolved, GC/chamydia and HIV negative   - candidal intertrigo +/- cellulitis of the pannus - clotrimazole cream 1% BID 2/26 -+ abx as per above    Hematology/DVT ppx  #Anticoagulation   #Thrombocytopenia  #Anemia   - Hb stable 10.1, thrombocytopenia likely r/t shearing 2/2 ECMO circuit, continue to monitor  - 4T score low probability of HIT, received heparin at OSH, PF4 neg, SHAWANDA pending  - argatroban on hold post tracheostomy x24 hours, will resume tomorrow  with goal 50-70  - LE duplex 2/25 neg  - s/p 1 u PRBC 2/26 for O2 delivery  - may need to consider platelet transfusion as oozing from L subclavian noted - now resolved    Endocrine  #DM  -A1c 6.7   - obesity with metabolic syndrome  - diabetes with steroid induced hyperglycemia, mild - ISS  - c/w decadron and taper iso ARDS  - pituitary workup largely unremarkable, steroids as above given recent weight gain but likely large component of volume though  - c/w FS Q6h and moderate ISS    SKin/Lines  #Access  #Cellulitis   #DTI with skin sloughing  - L subclavian TLC inserted 2/25   - L radial artery line 2/25  - candidal intertrigo +/- cellulitis of the pannus - clotrimazole cream 1% BID 2/26 -+ abx as per above  - DTI to right buttocks (sloughing of skin) and B/L feet   - wound consult placed     #Blisters  Blisters noted soles of b/l feet   - VA STEPHEN/PVR to evaluate for PAD  - podiatry consulted for worsening of b/l foot blisters- recs vascular consult  - Vascular consult placed- f/u recs  - VA duplex b/l LE ordered    GOC   - full code   - palliative involved  and following while on ECMO   - Mother, father involved in care are next of kin/surrogates and patient's girlfriend/partner is also involved but defers to parents   38 yo M w/ class III obesity, pAfib (no AC), HTN, BEA (on CPAP), history of b/l papilledema attributed to pseudotumor cerebri, who is transferred from [place] on 2/26 for VV ECMO 2/2 ARDS. Patient initially presented to [place] on 2/24 for SOB and b/l LE edema. As per chart review, patient endorses some degree of SOB and b/l LE edema x 3 months with significant weight gain. As per ICU team, patient had positive sick contacts with viral URI 1 to 2 weeks PTA and since then has had worsening SOB. Patient found to be reportedly hypoxemic to 70% on RA with worsening respiratory status/secretions and somnolence in the ED. Patient was a difficult intubation (~6 attempts). Despite optimal vent management, patient remained hypoxemic. Unable to prone due to obesity. Patient cannulated for VV ECMO on 2/25 and transferred to [facility] for further management.     Neuro  #Mental status   #pseudotumor cerebri  - history of pseudotumor cerebri s/p LP under fluoro in 2024 with high opening pressure with MRI 2024 also showed possible pituitary mass but unclear because of pressure effect from pseudotumor cerebri causing partially empty sella. Considering to start acetazolamide outpatient but never started  - prolactin high (30), defer checking cortisol axis given already received steroids but should be investigated, ACTH (<1.5) low but should be suppressed iso steroids  - concern for pituitary adenoma, however TSH .79 normal,  fT4 0.9 normal, and low T3 67  - sedated with Dilaudid, propofol, and Precedex gtt plan to wean off propofol and wean down on dilaudid gtt as tolerated   - with lowered sedation pt awake, EASLEY, able to follow commands, however became restless pulling at lines   - Seroquel increased to 50mg BID for agitation/ICU delirium -  monitor QTc - PO meds held iso new PEG placement, can resume x24 hrs  -will restart seroquel 25mg BID today (QTc 475 today)   - paralytics d/c'd 2/27   - PT/OT following     Cardiovascular  #HTN   #Atrial fibrillation   - Hx of a. raegan not on AC has not had any episodes during admission   - echo from 2024 did not demonstrate elevation of pulmonary pressures, but now with severe PH suggesting chronicity on TTE at    - TTE on 2/25 showed LV EF 61%, enlarged RV with TAPSE 2.1cm, ePASP at least 49 (Patient had 10/2024 TTE with normal LV and RV with ePASP 14).  - ROBERT 3/1 showing mild TR, enlarged RV with normal LV/RV function. PEEP increased to 24 without signs of RV dilation  - troponins initially elevated, peaked at 162 however downtrended likely from RV dilation and strain - EKG neg for any typical ischemia  - s/p esmolol drip for high flows now D/C'd may need to consider again if need for higher flows  - PEG tube in place will start amlodipine 5mg daily and lisinopril 5mg daily and increase as needed   - ECG daily to monitor QTc  as has been on higher end but will watch closely on seroquel     Pulmonary  #Pulmonary HTN  #ARDS  #Multifocal Pneumonia   #BEA   - history of diagnosed BEA/?OHS intermittent compliance to home PAP  - c/f acute on suspected chronic pulmonary hypertension in setting of possible BEA/OHS c/b pneumonia, ARDS, and pulmonary edema requiring difficult intubation on 2/25 and VVecmo 2/25-3/7. Now s/p tracheostomy 3/7 with weaning trials.   - CTA chest on 2/24 negative for pulmonary embolism, notable for patchy bilateral lower lobe opacities, hepatomegaly, mild ascites, edema/induration of the abdominal pannus.  - Full RVP and BAL cultures negative    - Bronchoscopy demonstrated copious purulent secretions 2/26 and 2/27  - Bronchoscopy 3/1 noted with small amount of old, dried blood at R and L mainstem, not occluding airway  - Bronchoscopy 3/5 with evidence of bleeding in the LLL and the distal trachea/RMSB lateral aspect of the bifurcation. BAL ngtd  - s/p Dexamethasone followed DEXA/ARDS protocol (20mg x5 days then 10mg x5 days)  - C/w duoNeb q6 hours + 3% saline - stop IPV due to ET tube migration and minimal secretion burden  - c/w PC/AC PIP:34  RR 14 PC: 12 PEEP:22 Fio2 40% ~500-900ml tolerating PS trial 8/22   - difficult glottic visualization due to large tongue upon intubation at [facility] 2/25 but able to visualize here easily with S4 video laryngoscope  - S/p Flex bronchoscopy tracheostomy #6 portex proximal XLT, placed 3/7 by CT surgery in OR, CTsx to remove trach sutures   -CTsx to remove ECMO sutures ~10 days post decannulation       Gastrointestinal  #Diet   #Transaminitis - resolving  - tolerated tube feeds via NGT  -s/p Endoscopic percutaneous gastrostomy LUQ PEG 3/7 plan to restart tube feeds and PO meds today   - elevated Tbili likely iso hemolysis 2/2 high ECMO flows, mild transaminitis now improving   - hepatomegaly and mild ascites noted on CT A/P - no pocket to tap  - RUQ ultrasound with steatosis    - c/w bowel regimen with Senna, Miralax and Movantik - having BMs adequately from 3/5 with aggressive regimen titrate bowel regimen as per BMs  - triglycerides have been low despite high doses of propofol   - nutrition following       Gu/Renal  #ELLA  #Hypernatremia   #Non-AG Metabolic Alkalosis  - ELLA - improved, likely ATN/vascular congestion  - good UO, overall net negative  - c/w free h20 250ml q6 hr for hypernatremia    - contraction alkalosis likely 2/2 diuretics, given albumin 250ml x1, acetazolamide 500mg  -c/w Lasix 40mg q6 goal overall net negative lasix gtt discontinued   - monitor urine and electrolytes closely given aggressive diuresis      Infectious disease  #leukocytosis  #Pneumonia   #cellulitis   - Leukocytosis now likely iso steroids, remains afebrile  - s/p cefepime, linezolid, and aztihro 2/25-2/26   - s/p vanco (2/26-3/1) and c/w zosyn for a total of ~10 days (2/26-   ) to cover cellulitis/pneumonia will stop tomorrow if cultures remain negative   - full RVP negative  - BCx x 2 2/24 and 2/26 currently NGTD, UCx 2/25 NGTD, tracheal aspirate Cx from 2/25 with normal commensal kyle   - strep pneumo Ag and urine legionella negative   - s/p Mupiricon b/l nares for +MSSA  (2/26-3/3)  - BAL cultures all with NGTD  - ?penile meatus yellow discharge resolved, GC/chamydia and HIV negative   - candidal intertrigo +/- cellulitis of the pannus - clotrimazole cream 1% BID 2/26 -+ abx as per above    Hematology/DVT ppx  #Anticoagulation   #Thrombocytopenia  #Anemia   - Hb stable, thrombocytopenia likely r/t shearing 2/2 ECMO circuit now improving off circuit   - 4T score low probability of HIT, received heparin at OSH, PF4 neg, SHAWANDA pending  - argatroban on hold post tracheostomy x24 hours, will resume today with goal 50-70 pending official VA duplex post decannulation   - LE duplex 2/25 neg  - s/p 1 u PRBC 2/26 for O2 delivery  -VA duplex to upper and lower ext ordered 3/7    Endocrine  #DM  -A1c 6.7   - obesity with metabolic syndrome  - diabetes with steroid induced hyperglycemia, mild - ISS  -s/p DEXA/ARDS dexamethasone protocol   - pituitary workup largely unremarkable, steroids as above given recent weight gain but likely large component of volume though  - c/w FS Q6h and moderate ISS and adjust if needed now off steroids     SKin/Lines  #Access  #Cellulitis   #DTI with skin sloughing  #Blisters  - L subclavian TLC inserted 2/25-3/8  - L radial artery line 2/25  -Right IJ and Right Fem ECMO cannulas 2/25-3/7  - candidal intertrigo +/- cellulitis of the pannus - clotrimazole cream 1% BID 2/26 -+ abx as per above  - DTI to right buttocks (sloughing of skin) and B/L feet   - wound consult placed   -Blisters noted soles of b/l feet   - podiatry following consulted for  b/l foot blisters- recs vascular consult and on 3/4 lanced bilateral foot serous blister with mild serous drainage now with wound care and f/u out patient   - Vascular consulted b/l blisters likely pressure related, recommending z flow boots for offloading  - No arterial disease and b/l 3 vessel runoff on VA duplex performed 3/5. No indication for further invasive or non-invasive studies        GOC   - full code   - palliative involved  and following while on ECMO   - Mother, father involved in care are next of kin/surrogates and patient's girlfriend/partner is also involved but defers to parents

## 2025-03-09 LAB
ALBUMIN SERPL ELPH-MCNC: 2.8 G/DL — LOW (ref 3.3–5)
ALBUMIN SERPL ELPH-MCNC: 3.1 G/DL — LOW (ref 3.3–5)
ALBUMIN SERPL ELPH-MCNC: 3.2 G/DL — LOW (ref 3.3–5)
ALP SERPL-CCNC: 49 U/L — SIGNIFICANT CHANGE UP (ref 40–120)
ALP SERPL-CCNC: 50 U/L — SIGNIFICANT CHANGE UP (ref 40–120)
ALP SERPL-CCNC: 51 U/L — SIGNIFICANT CHANGE UP (ref 40–120)
ALT FLD-CCNC: 56 U/L — HIGH (ref 4–41)
ALT FLD-CCNC: 57 U/L — HIGH (ref 4–41)
ALT FLD-CCNC: 60 U/L — HIGH (ref 4–41)
ANION GAP SERPL CALC-SCNC: 7 MMOL/L — SIGNIFICANT CHANGE UP (ref 7–14)
ANION GAP SERPL CALC-SCNC: 8 MMOL/L — SIGNIFICANT CHANGE UP (ref 7–14)
ANION GAP SERPL CALC-SCNC: 9 MMOL/L — SIGNIFICANT CHANGE UP (ref 7–14)
APTT BLD: 51.6 SEC — HIGH (ref 24.5–35.6)
APTT BLD: 52.7 SEC — HIGH (ref 24.5–35.6)
APTT BLD: 55 SEC — HIGH (ref 24.5–35.6)
AST SERPL-CCNC: 31 U/L — SIGNIFICANT CHANGE UP (ref 4–40)
AST SERPL-CCNC: 43 U/L — HIGH (ref 4–40)
AST SERPL-CCNC: 45 U/L — HIGH (ref 4–40)
BASOPHILS # BLD AUTO: 0 K/UL — SIGNIFICANT CHANGE UP (ref 0–0.2)
BASOPHILS NFR BLD AUTO: 0 % — SIGNIFICANT CHANGE UP (ref 0–2)
BILIRUB SERPL-MCNC: 2 MG/DL — HIGH (ref 0.2–1.2)
BILIRUB SERPL-MCNC: 2.2 MG/DL — HIGH (ref 0.2–1.2)
BILIRUB SERPL-MCNC: 2.5 MG/DL — HIGH (ref 0.2–1.2)
BLOOD GAS ARTERIAL - LYTES,HGB,ICA,LACT RESULT: SIGNIFICANT CHANGE UP
BUN SERPL-MCNC: 27 MG/DL — HIGH (ref 7–23)
BUN SERPL-MCNC: 28 MG/DL — HIGH (ref 7–23)
BUN SERPL-MCNC: 29 MG/DL — HIGH (ref 7–23)
CA-I BLD-SCNC: 1.1 MMOL/L — LOW (ref 1.15–1.29)
CA-I BLD-SCNC: 1.11 MMOL/L — LOW (ref 1.15–1.29)
CALCIUM SERPL-MCNC: 8.2 MG/DL — LOW (ref 8.4–10.5)
CALCIUM SERPL-MCNC: 8.3 MG/DL — LOW (ref 8.4–10.5)
CALCIUM SERPL-MCNC: 8.3 MG/DL — LOW (ref 8.4–10.5)
CHLORIDE SERPL-SCNC: 105 MMOL/L — SIGNIFICANT CHANGE UP (ref 98–107)
CHLORIDE SERPL-SCNC: 106 MMOL/L — SIGNIFICANT CHANGE UP (ref 98–107)
CHLORIDE SERPL-SCNC: 109 MMOL/L — HIGH (ref 98–107)
CO2 SERPL-SCNC: 32 MMOL/L — HIGH (ref 22–31)
CO2 SERPL-SCNC: 34 MMOL/L — HIGH (ref 22–31)
CO2 SERPL-SCNC: 34 MMOL/L — HIGH (ref 22–31)
CREAT SERPL-MCNC: 0.74 MG/DL — SIGNIFICANT CHANGE UP (ref 0.5–1.3)
CREAT SERPL-MCNC: 0.79 MG/DL — SIGNIFICANT CHANGE UP (ref 0.5–1.3)
CREAT SERPL-MCNC: 0.88 MG/DL — SIGNIFICANT CHANGE UP (ref 0.5–1.3)
EGFR: 114 ML/MIN/1.73M2 — SIGNIFICANT CHANGE UP
EGFR: 114 ML/MIN/1.73M2 — SIGNIFICANT CHANGE UP
EGFR: 117 ML/MIN/1.73M2 — SIGNIFICANT CHANGE UP
EGFR: 117 ML/MIN/1.73M2 — SIGNIFICANT CHANGE UP
EGFR: 120 ML/MIN/1.73M2 — SIGNIFICANT CHANGE UP
EGFR: 120 ML/MIN/1.73M2 — SIGNIFICANT CHANGE UP
EOSINOPHIL # BLD AUTO: 0.03 K/UL — SIGNIFICANT CHANGE UP (ref 0–0.5)
EOSINOPHIL NFR BLD AUTO: 0.2 % — SIGNIFICANT CHANGE UP (ref 0–6)
GLUCOSE BLDC GLUCOMTR-MCNC: 114 MG/DL — HIGH (ref 70–99)
GLUCOSE BLDC GLUCOMTR-MCNC: 115 MG/DL — HIGH (ref 70–99)
GLUCOSE BLDC GLUCOMTR-MCNC: 120 MG/DL — HIGH (ref 70–99)
GLUCOSE BLDC GLUCOMTR-MCNC: 127 MG/DL — HIGH (ref 70–99)
GLUCOSE SERPL-MCNC: 118 MG/DL — HIGH (ref 70–99)
GLUCOSE SERPL-MCNC: 135 MG/DL — HIGH (ref 70–99)
GLUCOSE SERPL-MCNC: 136 MG/DL — HIGH (ref 70–99)
HCT VFR BLD CALC: 34.6 % — LOW (ref 39–50)
HGB BLD-MCNC: 9.7 G/DL — LOW (ref 13–17)
IANC: 8.02 K/UL — HIGH (ref 1.8–7.4)
IMM GRANULOCYTES NFR BLD AUTO: 0.4 % — SIGNIFICANT CHANGE UP (ref 0–0.9)
INR BLD: 2.01 RATIO — HIGH (ref 0.85–1.16)
INR BLD: 2.08 RATIO — HIGH (ref 0.85–1.16)
INR BLD: 2.15 RATIO — HIGH (ref 0.85–1.16)
LYMPHOCYTES # BLD AUTO: 18.1 % — SIGNIFICANT CHANGE UP (ref 13–44)
LYMPHOCYTES # BLD AUTO: 2.18 K/UL — SIGNIFICANT CHANGE UP (ref 1–3.3)
MAGNESIUM SERPL-MCNC: 1.9 MG/DL — SIGNIFICANT CHANGE UP (ref 1.6–2.6)
MAGNESIUM SERPL-MCNC: 2 MG/DL — SIGNIFICANT CHANGE UP (ref 1.6–2.6)
MAGNESIUM SERPL-MCNC: 2.1 MG/DL — SIGNIFICANT CHANGE UP (ref 1.6–2.6)
MCHC RBC-ENTMCNC: 22.9 PG — LOW (ref 27–34)
MCHC RBC-ENTMCNC: 28 G/DL — LOW (ref 32–36)
MCV RBC AUTO: 81.8 FL — SIGNIFICANT CHANGE UP (ref 80–100)
MONOCYTES # BLD AUTO: 1.75 K/UL — HIGH (ref 0–0.9)
MONOCYTES NFR BLD AUTO: 14.5 % — HIGH (ref 2–14)
NEUTROPHILS # BLD AUTO: 8.02 K/UL — HIGH (ref 1.8–7.4)
NEUTROPHILS NFR BLD AUTO: 66.8 % — SIGNIFICANT CHANGE UP (ref 43–77)
NRBC # BLD AUTO: 0 K/UL — SIGNIFICANT CHANGE UP (ref 0–0)
NRBC # FLD: 0 K/UL — SIGNIFICANT CHANGE UP (ref 0–0)
NRBC BLD AUTO-RTO: 0 /100 WBCS — SIGNIFICANT CHANGE UP (ref 0–0)
PHOSPHATE SERPL-MCNC: 2.4 MG/DL — LOW (ref 2.5–4.5)
PHOSPHATE SERPL-MCNC: 2.5 MG/DL — SIGNIFICANT CHANGE UP (ref 2.5–4.5)
PHOSPHATE SERPL-MCNC: 3.5 MG/DL — SIGNIFICANT CHANGE UP (ref 2.5–4.5)
PLATELET # BLD AUTO: 110 K/UL — LOW (ref 150–400)
POTASSIUM SERPL-MCNC: 3.4 MMOL/L — LOW (ref 3.5–5.3)
POTASSIUM SERPL-MCNC: 3.5 MMOL/L — SIGNIFICANT CHANGE UP (ref 3.5–5.3)
POTASSIUM SERPL-MCNC: 3.7 MMOL/L — SIGNIFICANT CHANGE UP (ref 3.5–5.3)
POTASSIUM SERPL-SCNC: 3.4 MMOL/L — LOW (ref 3.5–5.3)
POTASSIUM SERPL-SCNC: 3.5 MMOL/L — SIGNIFICANT CHANGE UP (ref 3.5–5.3)
POTASSIUM SERPL-SCNC: 3.7 MMOL/L — SIGNIFICANT CHANGE UP (ref 3.5–5.3)
PROT SERPL-MCNC: 5.8 G/DL — LOW (ref 6–8.3)
PROT SERPL-MCNC: 6 G/DL — SIGNIFICANT CHANGE UP (ref 6–8.3)
PROT SERPL-MCNC: 6.2 G/DL — SIGNIFICANT CHANGE UP (ref 6–8.3)
PROTHROM AB SERPL-ACNC: 23.2 SEC — HIGH (ref 9.9–13.4)
PROTHROM AB SERPL-ACNC: 24.6 SEC — HIGH (ref 9.9–13.4)
PROTHROM AB SERPL-ACNC: 25.4 SEC — HIGH (ref 9.9–13.4)
RBC # BLD: 4.23 M/UL — SIGNIFICANT CHANGE UP (ref 4.2–5.8)
RBC # FLD: 21.4 % — HIGH (ref 10.3–14.5)
SODIUM SERPL-SCNC: 147 MMOL/L — HIGH (ref 135–145)
SODIUM SERPL-SCNC: 148 MMOL/L — HIGH (ref 135–145)
SODIUM SERPL-SCNC: 149 MMOL/L — HIGH (ref 135–145)
WBC # BLD: 12.03 K/UL — HIGH (ref 3.8–10.5)
WBC # FLD AUTO: 12.03 K/UL — HIGH (ref 3.8–10.5)

## 2025-03-09 PROCEDURE — 99291 CRITICAL CARE FIRST HOUR: CPT

## 2025-03-09 RX ORDER — QUETIAPINE FUMARATE 25 MG/1
25 TABLET ORAL AT BEDTIME
Refills: 0 | Status: DISCONTINUED | OUTPATIENT
Start: 2025-03-09 | End: 2025-03-14

## 2025-03-09 RX ORDER — MAGNESIUM SULFATE 500 MG/ML
2 SYRINGE (ML) INJECTION ONCE
Refills: 0 | Status: COMPLETED | OUTPATIENT
Start: 2025-03-09 | End: 2025-03-09

## 2025-03-09 RX ORDER — IBUPROFEN 200 MG
600 TABLET ORAL ONCE
Refills: 0 | Status: COMPLETED | OUTPATIENT
Start: 2025-03-09 | End: 2025-03-09

## 2025-03-09 RX ORDER — INSULIN LISPRO 100 U/ML
INJECTION, SOLUTION INTRAVENOUS; SUBCUTANEOUS EVERY 6 HOURS
Refills: 0 | Status: DISCONTINUED | OUTPATIENT
Start: 2025-03-09 | End: 2025-03-20

## 2025-03-09 RX ADMIN — Medication 15 MILLILITER(S): at 11:05

## 2025-03-09 RX ADMIN — IPRATROPIUM BROMIDE AND ALBUTEROL SULFATE 3 MILLILITER(S): .5; 2.5 SOLUTION RESPIRATORY (INHALATION) at 08:22

## 2025-03-09 RX ADMIN — IPRATROPIUM BROMIDE AND ALBUTEROL SULFATE 3 MILLILITER(S): .5; 2.5 SOLUTION RESPIRATORY (INHALATION) at 20:33

## 2025-03-09 RX ADMIN — QUETIAPINE FUMARATE 25 MILLIGRAM(S): 25 TABLET ORAL at 21:35

## 2025-03-09 RX ADMIN — Medication 4 MILLILITER(S): at 20:35

## 2025-03-09 RX ADMIN — NALOXEGOL OXALATE 12.5 MILLIGRAM(S): 12.5 TABLET, FILM COATED ORAL at 11:05

## 2025-03-09 RX ADMIN — Medication 15 MILLILITER(S): at 05:09

## 2025-03-09 RX ADMIN — Medication 40 MILLIEQUIVALENT(S): at 13:50

## 2025-03-09 RX ADMIN — Medication 4 MILLILITER(S): at 15:21

## 2025-03-09 RX ADMIN — POLYETHYLENE GLYCOL 3350 17 GRAM(S): 17 POWDER, FOR SOLUTION ORAL at 11:05

## 2025-03-09 RX ADMIN — Medication 25 GRAM(S): at 05:11

## 2025-03-09 RX ADMIN — ARGATROBAN 20.2 MICROGRAM(S)/KG/MIN: 100 INJECTION, SOLUTION INTRAVENOUS at 21:50

## 2025-03-09 RX ADMIN — Medication 40 MILLIEQUIVALENT(S): at 05:11

## 2025-03-09 RX ADMIN — Medication 1 APPLICATION(S): at 05:12

## 2025-03-09 RX ADMIN — Medication 600 MILLIGRAM(S): at 21:34

## 2025-03-09 RX ADMIN — DEXMEDETOMIDINE HYDROCHLORIDE IN SODIUM CHLORIDE 90.3 MICROGRAM(S)/KG/HR: 4 INJECTION INTRAVENOUS at 21:51

## 2025-03-09 RX ADMIN — Medication 0.8 MG/HR: at 07:05

## 2025-03-09 RX ADMIN — Medication 25 GRAM(S): at 00:26

## 2025-03-09 RX ADMIN — Medication 500000 UNIT(S): at 18:28

## 2025-03-09 RX ADMIN — ARGATROBAN 20.2 MICROGRAM(S)/KG/MIN: 100 INJECTION, SOLUTION INTRAVENOUS at 05:13

## 2025-03-09 RX ADMIN — CLOTRIMAZOLE 1 APPLICATION(S): 1 CREAM TOPICAL at 05:12

## 2025-03-09 RX ADMIN — Medication 600 MILLIGRAM(S): at 22:30

## 2025-03-09 RX ADMIN — Medication 20 MILLIEQUIVALENT(S): at 00:28

## 2025-03-09 RX ADMIN — Medication 500000 UNIT(S): at 05:09

## 2025-03-09 RX ADMIN — IPRATROPIUM BROMIDE AND ALBUTEROL SULFATE 3 MILLILITER(S): .5; 2.5 SOLUTION RESPIRATORY (INHALATION) at 15:21

## 2025-03-09 RX ADMIN — Medication 0.8 MG/HR: at 21:51

## 2025-03-09 RX ADMIN — Medication 25 GRAM(S): at 13:50

## 2025-03-09 RX ADMIN — Medication 40 MILLIEQUIVALENT(S): at 16:34

## 2025-03-09 RX ADMIN — Medication 15 MILLILITER(S): at 18:30

## 2025-03-09 RX ADMIN — FUROSEMIDE 40 MILLIGRAM(S): 10 INJECTION INTRAMUSCULAR; INTRAVENOUS at 00:23

## 2025-03-09 RX ADMIN — CLOTRIMAZOLE 1 APPLICATION(S): 1 CREAM TOPICAL at 18:30

## 2025-03-09 RX ADMIN — LISINOPRIL 5 MILLIGRAM(S): 5 TABLET ORAL at 11:05

## 2025-03-09 RX ADMIN — QUETIAPINE FUMARATE 25 MILLIGRAM(S): 25 TABLET ORAL at 05:13

## 2025-03-09 RX ADMIN — DEXMEDETOMIDINE HYDROCHLORIDE IN SODIUM CHLORIDE 90.3 MICROGRAM(S)/KG/HR: 4 INJECTION INTRAVENOUS at 07:05

## 2025-03-09 RX ADMIN — Medication 500000 UNIT(S): at 11:05

## 2025-03-09 RX ADMIN — Medication 4 MILLILITER(S): at 08:22

## 2025-03-09 RX ADMIN — Medication 4 MILLILITER(S): at 03:20

## 2025-03-09 RX ADMIN — Medication 500000 UNIT(S): at 00:23

## 2025-03-09 RX ADMIN — AMLODIPINE BESYLATE 5 MILLIGRAM(S): 10 TABLET ORAL at 05:13

## 2025-03-09 RX ADMIN — IPRATROPIUM BROMIDE AND ALBUTEROL SULFATE 3 MILLILITER(S): .5; 2.5 SOLUTION RESPIRATORY (INHALATION) at 03:20

## 2025-03-09 NOTE — PROGRESS NOTE ADULT - ASSESSMENT
39 M,  Hx obesity, VV ecmo now decanulated and trach and peg. PEG loosen to 6.5 at the skin    Plan,  - Advance TF as tolerated.   - ECMO sutures out in 2 weeks.     Gatito Savage MD, PGY6  Thoracic Surgery

## 2025-03-09 NOTE — PROGRESS NOTE ADULT - SUBJECTIVE AND OBJECTIVE BOX
Interval Events:  -patient with rising bicarb and sodium held lasix   -propofol off and patient communicating       HPI:  38 yo M w/ class III obesity, pAfib (no AC), HTN, BEA (on CPAP), history of b/l papilledema attributed to pseudotumor cerebri s/p LP in 2024 with very high opening pressure, who is transferred for VV ECMO for ARDS and severe hypoxia. Patient initially presented to [facility] on 2/24 for SOB and b/l LE edema. 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 He is a current every day smoker of Newports and marijuana, but does not drink or do other drugs. Currently lives with his mother. There is a long term partner in his life, but they are not , and they have an on/off relationship currently not very involved with each other as per the patient's mother and aunt.  At [facility] where he was getting an eval last year for shortness of breath, he had a sleep study and was diagnosed with sleep apnea, prescribed a PAP machine, which he has in his room but the mother is not sure how many nights per week he uses it. Recently, the last day or two, his mother and brother have been under the weather with URIs and the patient 2 days ago started to develop a ruynny nose, ough, some wheezing of the chest, as well as worsening shortness of breath and fevers at home. He called his aunt who told him to go get checked out and then he landed at the [facility] ED. Patient found to be reportedly hypoxemic to 70% on RA with worsening respiratory status/secretions and somnolence in the ED. Patient was intubated with difficulty (7 attempts) due to very large tongue secretions. Despite optimal vent management, patient remained hypoxemic. Unable to prone due to obesity. Patient cannulated for VV ECMO on 2/25 and transferred to [facility] for further management.     [facility] labs notable for MRSA PCR -, Fluvid -, urine legionella -, sputum gram stain  with GPC and GPR    CTA chest on 2/24 negative for pulmonary embolism, notable for patchy bilateral lower lobe opacities, hepatomegaly, mild ascites, edema/induration of the abdominal pannus.    LE duplex on 2/25 negative for DVT    TTE on 2/25 showed LV EF 61%, enlarged RV with TAPSE 2.1cm, ePASP at least 49 (Patient had 10/2024 TTE with normal LV and RV with ePASP 14).    Only home med is Lasix. Not on acetazolamide for pseudotumor or on Wegovy (was pending auth) (26 Feb 2025 01:48)      REVIEW OF SYSTEMS:  Constitutional: [ ] negative [x ] fevers [ ] chills [ ] weight loss [ ] weight gain  HEENT: [ ] negative [ ] dry eyes [ ] eye irritation [ ] postnasal drip [ ] nasal congestion  CV: [ ] negative  [ ] chest pain [ ] orthopnea [ ] palpitations [ ] murmur  Resp: [ ] negative [ ] cough [ ] shortness of breath [ ] dyspnea [ ] wheezing [ ] sputum [ ] hemoptysis  GI: [ ] negative [ ] nausea [ ] vomiting [ ] diarrhea [ ] constipation [ ] abd pain [ ] dysphagia   : [ ] negative [ ] dysuria [ ] nocturia [ ] hematuria [ ] increased urinary frequency  Musculoskeletal: [ ] negative [ ] back pain [ ] myalgias [ ] arthralgias [ ] fracture  Skin: [ ] negative [ ] rash [ ] itch  Neurological: [ ] negative [ ] headache [ ] dizziness [ ] syncope [ ] weakness [ ] numbness  Psychiatric: [ ] negative [ ] anxiety [ ] depression  Endocrine: [ ] negative [ ] diabetes [ ] thyroid problem  Hematologic/Lymphatic: [ ] negative [ ] anemia [ ] bleeding problem  Allergic/Immunologic: [ ] negative [ ] itchy eyes [ ] nasal discharge [ ] hives [ ] angioedema  [x ] All other systems negative  [ ] Unable to assess ROS because ________    OBJECTIVE:  ICU Vital Signs Last 24 Hrs  T(C): 37.4 (09 Mar 2025 04:00), Max: 38.9 (08 Mar 2025 16:00)  T(F): 99.3 (09 Mar 2025 04:00), Max: 102 (08 Mar 2025 16:00)  HR: 83 (09 Mar 2025 06:00) (72 - 116)  BP: --  BP(mean): --  ABP: 141/65 (09 Mar 2025 06:00) (114/41 - 163/74)  ABP(mean): 86 (09 Mar 2025 06:00) (59 - 97)  RR: 21 (09 Mar 2025 06:00) (11 - 22)  SpO2: 100% (09 Mar 2025 06:00) (93% - 100%)    O2 Parameters below as of 09 Mar 2025 06:00  Patient On (Oxygen Delivery Method): ventilator, CPAP    O2 Concentration (%): 30      Mode: CPAP with PS, FiO2: 40, PEEP: 22, PS: 8, MAP: 25, PC: , PIP: 31    03-07 @ 07:01  -  03-08 @ 07:00  --------------------------------------------------------  IN: 4530.7 mL / OUT: 9975 mL / NET: -5444.3 mL    03-08 @ 06:01  -  03-09 @ 06:43  --------------------------------------------------------  IN: 4999.8 mL / OUT: 5785 mL / NET: -785.2 mL      CAPILLARY BLOOD GLUCOSE      POCT Blood Glucose.: 127 mg/dL (09 Mar 2025 05:06)    Physical Exam:   Constitutional: ill appearing, no acute distress   HEENT: + PERRLA, EOMI, no drainage or redness  Neck: supple,  No JVD, Right IJ ecmo cannula in place   Respiratory: Ventilator assisted breath Sounds diminished bilaterally to auscultation, no accessory muscle use noted  Cardiovascular: Regular rate, regular rhythm, normal S1, S2; no murmurs or rub  Gastrointestinal: Obese, soft, non distended, no hepatosplenomegaly, normal bowel sounds  Extremities: + 2 peripheral edema, no cyanosis, no clubbing   Vascular: Equal and normal pulses: 2+ peripheral pulses throughout  Neurological: sedated/intubated  Musculoskeletal: No joint swelling or deformity; no limitation of movement  Skin: warm, dry, well perfused, cellulitis to pannus area, DTI to right buttocks, plantar blisters b/l feet      HOSPITAL MEDICATIONS:  Standing Meds:  albuterol/ipratropium for Nebulization 3 milliLiter(s) Nebulizer every 6 hours  amLODIPine   Tablet 5 milliGRAM(s) Oral daily  argatroban Infusion 1.4 MICROgram(s)/kG/Min IV Continuous <Continuous>  chlorhexidine 0.12% Liquid 15 milliLiter(s) Oral Mucosa every 12 hours  chlorhexidine 2% Cloths 1 Application(s) Topical <User Schedule>  clotrimazole 1% Cream 1 Application(s) Topical two times a day  dexMEDEtomidine Infusion 1.5 MICROgram(s)/kG/Hr IV Continuous <Continuous>  dextrose 50% Injectable 25 Gram(s) IV Push once  dextrose 50% Injectable 12.5 Gram(s) IV Push once  dextrose 50% Injectable 25 Gram(s) IV Push once  glucagon  Injectable 1 milliGRAM(s) IntraMuscular once  HYDROmorphone Infusion. 0.8 mG/Hr IV Continuous <Continuous>  insulin lispro (ADMELOG) corrective regimen sliding scale   SubCutaneous every 6 hours  lisinopril 5 milliGRAM(s) Oral every 24 hours  multivitamin/minerals/iron Oral Solution (CENTRUM) 15 milliLiter(s) Oral daily  naloxegol 12.5 milliGRAM(s) Oral daily  nystatin    Suspension 032296 Unit(s) Oral every 6 hours  pantoprazole  Injectable 40 milliGRAM(s) IV Push daily  petrolatum Ophthalmic Ointment 1 Application(s) Both EYES every 12 hours  piperacillin/tazobactam IVPB.. 4.5 Gram(s) IV Intermittent every 8 hours  polyethylene glycol 3350 17 Gram(s) Oral daily  propofol Infusion 25 MICROgram(s)/kG/Min IV Continuous <Continuous>  QUEtiapine 25 milliGRAM(s) Oral two times a day  senna Syrup 5 milliLiter(s) Oral at bedtime  sodium chloride 3%  Inhalation 4 milliLiter(s) Inhalation every 6 hours      PRN Meds:      LABS:                        9.7    12.03 )-----------( 110      ( 09 Mar 2025 03:48 )             34.6     Hgb Trend: 9.7<--, 10.0<--, 10.6<--, 10.7<--, 10.5<--  03-09    148[H]  |  105  |  29[H]  ----------------------------<  135[H]  3.5   |  34[H]  |  0.88    Ca    8.3[L]      09 Mar 2025 03:48  Phos  3.5     03-09  Mg     2.10     03-09    TPro  6.2  /  Alb  3.2[L]  /  TBili  2.0[H]  /  DBili  x   /  AST  31  /  ALT  56[H]  /  AlkPhos  49  03-09    Creatinine Trend: 0.88<--, 0.89<--, 0.87<--, 0.79<--, 0.78<--, 0.76<--  PT/INR - ( 09 Mar 2025 03:48 )   PT: 24.6 sec;   INR: 2.08 ratio         PTT - ( 09 Mar 2025 03:48 )  PTT:55.0 sec  Urinalysis Basic - ( 09 Mar 2025 03:48 )    Color: x / Appearance: x / SG: x / pH: x  Gluc: 135 mg/dL / Ketone: x  / Bili: x / Urobili: x   Blood: x / Protein: x / Nitrite: x   Leuk Esterase: x / RBC: x / WBC x   Sq Epi: x / Non Sq Epi: x / Bacteria: x      Arterial Blood Gas:  03-09 @ 03:48  7.40/66/99/41/98.2/13.8  ABG lactate: --  Arterial Blood Gas:  03-08 @ 21:47  7.43/60/124/40/98.4/13.4  ABG lactate: --  Arterial Blood Gas:  03-08 @ 11:30  7.44/58/90/39/98.8/13.1  ABG lactate: --  Arterial Blood Gas:  03-08 @ 00:18  7.42/60/90/39/97.6/12.3  ABG lactate: --  Arterial Blood Gas:  03-07 @ 17:15  7.39/58/121/35/99.9/8.5  ABG lactate: --  Arterial Blood Gas:  03-07 @ 15:10  7.40/54/116/33/98.2/7.3  ABG lactate: --  Arterial Blood Gas:  03-07 @ 11:10  7.36/60/93/34/97.3/6.9  ABG lactate: --        MICROBIOLOGY:     RADIOLOGY:  [ ] Reviewed and interpreted by me       Interval Events:  -patient with rising bicarb and sodium held lasix   -propofol off and patient communicating       HPI:  40 yo M w/ class III obesity, pAfib (no AC), HTN, BEA (on CPAP), history of b/l papilledema attributed to pseudotumor cerebri s/p LP in 2024 with very high opening pressure, who is transferred for VV ECMO for ARDS and severe hypoxia. Patient initially presented to [facility] on 2/24 for SOB and b/l LE edema. 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 He is a current every day smoker of Newports and marijuana, but does not drink or do other drugs. Currently lives with his mother. There is a long term partner in his life, but they are not , and they have an on/off relationship currently not very involved with each other as per the patient's mother and aunt.  At [facility] where he was getting an eval last year for shortness of breath, he had a sleep study and was diagnosed with sleep apnea, prescribed a PAP machine, which he has in his room but the mother is not sure how many nights per week he uses it. Recently, the last day or two, his mother and brother have been under the weather with URIs and the patient 2 days ago started to develop a ruynny nose, ough, some wheezing of the chest, as well as worsening shortness of breath and fevers at home. He called his aunt who told him to go get checked out and then he landed at the [facility] ED. Patient found to be reportedly hypoxemic to 70% on RA with worsening respiratory status/secretions and somnolence in the ED. Patient was intubated with difficulty (7 attempts) due to very large tongue secretions. Despite optimal vent management, patient remained hypoxemic. Unable to prone due to obesity. Patient cannulated for VV ECMO on 2/25 and transferred to [facility] for further management.     [facility] labs notable for MRSA PCR -, Fluvid -, urine legionella -, sputum gram stain  with GPC and GPR    CTA chest on 2/24 negative for pulmonary embolism, notable for patchy bilateral lower lobe opacities, hepatomegaly, mild ascites, edema/induration of the abdominal pannus.    LE duplex on 2/25 negative for DVT    TTE on 2/25 showed LV EF 61%, enlarged RV with TAPSE 2.1cm, ePASP at least 49 (Patient had 10/2024 TTE with normal LV and RV with ePASP 14).    Only home med is Lasix. Not on acetazolamide for pseudotumor or on Wegovy (was pending auth) (26 Feb 2025 01:48)      REVIEW OF SYSTEMS:  Constitutional: [ ] negative [x ] fevers [ ] chills [ ] weight loss [ ] weight gain  HEENT: [ ] negative [ ] dry eyes [ ] eye irritation [ ] postnasal drip [ ] nasal congestion  CV: [ ] negative  [ ] chest pain [ ] orthopnea [ ] palpitations [ ] murmur  Resp: [ ] negative [ ] cough [ ] shortness of breath [ ] dyspnea [ ] wheezing [ ] sputum [ ] hemoptysis  GI: [ ] negative [ ] nausea [ ] vomiting [ ] diarrhea [ ] constipation [ ] abd pain [ ] dysphagia   : [ ] negative [ ] dysuria [ ] nocturia [ ] hematuria [ ] increased urinary frequency  Musculoskeletal: [ ] negative [ ] back pain [ ] myalgias [ ] arthralgias [ ] fracture  Skin: [ ] negative [ ] rash [ ] itch  Neurological: [ ] negative [ ] headache [ ] dizziness [ ] syncope [ ] weakness [ ] numbness  Psychiatric: [ ] negative [ ] anxiety [ ] depression  Endocrine: [ ] negative [ ] diabetes [ ] thyroid problem  Hematologic/Lymphatic: [ ] negative [ ] anemia [ ] bleeding problem  Allergic/Immunologic: [ ] negative [ ] itchy eyes [ ] nasal discharge [ ] hives [ ] angioedema  [x ] All other systems negative  [ ] Unable to assess ROS because ________    OBJECTIVE:  ICU Vital Signs Last 24 Hrs  T(C): 37.4 (09 Mar 2025 04:00), Max: 38.9 (08 Mar 2025 16:00)  T(F): 99.3 (09 Mar 2025 04:00), Max: 102 (08 Mar 2025 16:00)  HR: 83 (09 Mar 2025 06:00) (72 - 116)  BP: --  BP(mean): --  ABP: 141/65 (09 Mar 2025 06:00) (114/41 - 163/74)  ABP(mean): 86 (09 Mar 2025 06:00) (59 - 97)  RR: 21 (09 Mar 2025 06:00) (11 - 22)  SpO2: 100% (09 Mar 2025 06:00) (93% - 100%)    O2 Parameters below as of 09 Mar 2025 06:00  Patient On (Oxygen Delivery Method): ventilator, CPAP    O2 Concentration (%): 30      Mode: CPAP with PS, FiO2: 40, PEEP: 22, PS: 8, MAP: 25, PC: , PIP: 31    03-07 @ 07:01  -  03-08 @ 07:00  --------------------------------------------------------  IN: 4530.7 mL / OUT: 9975 mL / NET: -5444.3 mL    03-08 @ 06:01  -  03-09 @ 06:43  --------------------------------------------------------  IN: 4999.8 mL / OUT: 5785 mL / NET: -785.2 mL      CAPILLARY BLOOD GLUCOSE      POCT Blood Glucose.: 127 mg/dL (09 Mar 2025 05:06)    Physical Exam:   Constitutional: ill appearing, no acute distress   HEENT: + PERRLA, EOMI, no drainage or redness  Neck: supple,  No JVD, trach midline  Respiratory: Ventilator assisted breath Sounds diminished bilaterally to auscultation, no accessory muscle use noted  Cardiovascular: Regular rate, regular rhythm, normal S1, S2; no murmurs or rub  Gastrointestinal: Obese, soft, non distended, no hepatosplenomegaly, normal bowel sounds  Extremities: + 2 peripheral edema, no cyanosis, no clubbing   Vascular: Equal and normal pulses: 2+ peripheral pulses throughout  Neurological: alert and oriented, non verbal d/t trach but can mouth words  Musculoskeletal: No joint swelling or deformity; no limitation of movement  Skin: warm, dry, well perfused, cellulitis to pannus area, DTI to right buttocks, plantar blisters b/l feet      HOSPITAL MEDICATIONS:  Standing Meds:  albuterol/ipratropium for Nebulization 3 milliLiter(s) Nebulizer every 6 hours  amLODIPine   Tablet 5 milliGRAM(s) Oral daily  argatroban Infusion 1.4 MICROgram(s)/kG/Min IV Continuous <Continuous>  chlorhexidine 0.12% Liquid 15 milliLiter(s) Oral Mucosa every 12 hours  chlorhexidine 2% Cloths 1 Application(s) Topical <User Schedule>  clotrimazole 1% Cream 1 Application(s) Topical two times a day  dexMEDEtomidine Infusion 1.5 MICROgram(s)/kG/Hr IV Continuous <Continuous>  dextrose 50% Injectable 25 Gram(s) IV Push once  dextrose 50% Injectable 12.5 Gram(s) IV Push once  dextrose 50% Injectable 25 Gram(s) IV Push once  glucagon  Injectable 1 milliGRAM(s) IntraMuscular once  HYDROmorphone Infusion. 0.8 mG/Hr IV Continuous <Continuous>  insulin lispro (ADMELOG) corrective regimen sliding scale   SubCutaneous every 6 hours  lisinopril 5 milliGRAM(s) Oral every 24 hours  multivitamin/minerals/iron Oral Solution (CENTRUM) 15 milliLiter(s) Oral daily  naloxegol 12.5 milliGRAM(s) Oral daily  nystatin    Suspension 839458 Unit(s) Oral every 6 hours  pantoprazole  Injectable 40 milliGRAM(s) IV Push daily  petrolatum Ophthalmic Ointment 1 Application(s) Both EYES every 12 hours  piperacillin/tazobactam IVPB.. 4.5 Gram(s) IV Intermittent every 8 hours  polyethylene glycol 3350 17 Gram(s) Oral daily  propofol Infusion 25 MICROgram(s)/kG/Min IV Continuous <Continuous>  QUEtiapine 25 milliGRAM(s) Oral two times a day  senna Syrup 5 milliLiter(s) Oral at bedtime  sodium chloride 3%  Inhalation 4 milliLiter(s) Inhalation every 6 hours      PRN Meds:      LABS:                        9.7    12.03 )-----------( 110      ( 09 Mar 2025 03:48 )             34.6     Hgb Trend: 9.7<--, 10.0<--, 10.6<--, 10.7<--, 10.5<--  03-09    148[H]  |  105  |  29[H]  ----------------------------<  135[H]  3.5   |  34[H]  |  0.88    Ca    8.3[L]      09 Mar 2025 03:48  Phos  3.5     03-09  Mg     2.10     03-09    TPro  6.2  /  Alb  3.2[L]  /  TBili  2.0[H]  /  DBili  x   /  AST  31  /  ALT  56[H]  /  AlkPhos  49  03-09    Creatinine Trend: 0.88<--, 0.89<--, 0.87<--, 0.79<--, 0.78<--, 0.76<--  PT/INR - ( 09 Mar 2025 03:48 )   PT: 24.6 sec;   INR: 2.08 ratio         PTT - ( 09 Mar 2025 03:48 )  PTT:55.0 sec  Urinalysis Basic - ( 09 Mar 2025 03:48 )    Color: x / Appearance: x / SG: x / pH: x  Gluc: 135 mg/dL / Ketone: x  / Bili: x / Urobili: x   Blood: x / Protein: x / Nitrite: x   Leuk Esterase: x / RBC: x / WBC x   Sq Epi: x / Non Sq Epi: x / Bacteria: x      Arterial Blood Gas:  03-09 @ 03:48  7.40/66/99/41/98.2/13.8  ABG lactate: --  Arterial Blood Gas:  03-08 @ 21:47  7.43/60/124/40/98.4/13.4  ABG lactate: --  Arterial Blood Gas:  03-08 @ 11:30  7.44/58/90/39/98.8/13.1  ABG lactate: --  Arterial Blood Gas:  03-08 @ 00:18  7.42/60/90/39/97.6/12.3  ABG lactate: --  Arterial Blood Gas:  03-07 @ 17:15  7.39/58/121/35/99.9/8.5  ABG lactate: --  Arterial Blood Gas:  03-07 @ 15:10  7.40/54/116/33/98.2/7.3  ABG lactate: --  Arterial Blood Gas:  03-07 @ 11:10  7.36/60/93/34/97.3/6.9  AB lactate: --        MICROBIOLOGY:     RADIOLOGY:  [ ] Reviewed and interpreted by me

## 2025-03-09 NOTE — PROGRESS NOTE ADULT - SUBJECTIVE AND OBJECTIVE BOX
Thoracic Surgery Progress Note    S: Patient seen and examined. No acute events overnight. Denies abdominal pain    O:  Physical Exam:  Gen: Laying in bed, NAD, Trached  HEENT: RIJ c/d/i, flat  Resp: Trach tube intact  Abd: soft, NT, ND, PEG moved to 6.5 at the skin  Ext: Right Fem c/d/i, flat    Vital Signs Last 24 Hrs  T(C): 37.4 (09 Mar 2025 08:00), Max: 38.9 (08 Mar 2025 16:00)  T(F): 99.3 (09 Mar 2025 08:00), Max: 102 (08 Mar 2025 16:00)  HR: 88 (09 Mar 2025 08:00) (72 - 116)  BP: --  BP(mean): --  RR: 16 (09 Mar 2025 08:00) (11 - 22)  SpO2: 100% (09 Mar 2025 08:00) (93% - 100%)    Parameters below as of 09 Mar 2025 08:00  Patient On (Oxygen Delivery Method): ventilator    O2 Concentration (%): 30    I&O's Detail    08 Mar 2025 06:01  -  09 Mar 2025 07:00  --------------------------------------------------------  IN:    Argatroban: 60.8 mL    Argatroban: 28.8 mL    Argatroban: 161.6 mL    Dexmedetomidine: 270.9 mL    Dexmedetomidine: 1686 mL    Enteral Tube Flush: 160 mL    Free Water: 1000 mL    HYRDOmorphone: 2.4 mL    HYRDOmorphone: 16 mL    IV PiggyBack: 555 mL    Peptamen Intense VHP: 190 mL    Propofol: 166.2 mL    Propofol: 823.4 mL  Total IN: 5121.1 mL    OUT:    Drain (mL): 200 mL    Indwelling Catheter - Urethral (mL): 5710 mL  Total OUT: 5910 mL    Total NET: -788.9 mL      09 Mar 2025 07:01  -  09 Mar 2025 08:25  --------------------------------------------------------  IN:    Argatroban: 20.2 mL    Dexmedetomidine: 90.3 mL    HYRDOmorphone: 0.8 mL    Peptamen Intense VHP: 20 mL  Total IN: 131.3 mL    OUT:    Indwelling Catheter - Urethral (mL): 150 mL  Total OUT: 150 mL    Total NET: -18.7 mL                                9.7    12.03 )-----------( 110      ( 09 Mar 2025 03:48 )             34.6       03-09    148[H]  |  105  |  29[H]  ----------------------------<  135[H]  3.5   |  34[H]  |  0.88    Ca    8.3[L]      09 Mar 2025 03:48  Phos  3.5     03-09  Mg     2.10     03-09    TPro  6.2  /  Alb  3.2[L]  /  TBili  2.0[H]  /  DBili  x   /  AST  31  /  ALT  56[H]  /  AlkPhos  49  03-09

## 2025-03-09 NOTE — PROGRESS NOTE ADULT - ATTENDING COMMENTS
Patient is a 38 yo M w/ class III obesity, pAfib (no AC), HTN, BEA (on CPAP), history of b/l pappiledema attributed to pseudotumor cerebri, who is transferred for VV ECMO for ARDS. Patient initially presented to [place] on 2/24 for SOB and b/l LE edema. As per chart review, patient endorses some degree of SOB and b/l LE edema x 3 months with significant weight gain. As per ICU team, patient had positive sick contacts with viral URI 1 to 2 weeks PTA and since then has had worsening SOB. Patient found to be reportedly hypoxemic to 70% on RA with worsening respiratory status/secretions and somnolence in the ED. Patient was intubated with difficulty (7 attempts). Despite optimal vent management, patient remained hypoxemic. Unable to prone due to obesity. Patient cannulated for VV ECMO on 2/25 and transferred to [facility] for further management.    ECMO decannulated 3/7.    S/P Trach and PEG 3/7    #ARDS  #VV ECMO  #Multifocal Pneumonia  #Shock - Resolved  #pHTN  #ELLA - Resolved  #Class III Obesity  #pAfib - No events on tele this admission  #Acute hypoxemic respiratory failure  #Likely chronic hypercapnic respiratory failure  #HTN  - Wean sedation. Off Prop. Wean Dilaudid. c/w Precedex gtt  - c/w Seroquel 25 qhs for now, monitor QTC  - c/w mechanical ventilatory support, tolerating PSV this AM 5/14. Wean PEEP as tolerated  - Trach care as per MICU team  - c/w airway clearance  - Monitor blood gases  - D/C Zosyn. Monitor off abx for now  - Monitor BMP and UOP. Diuresis PRN to maintain euvolemia. WIll hold diuretics for now given contraction.  - Tolerating trickle feeds, increase as tolerated  - Monitor BP on antihypertensives  - f/u post ECMO decannulation duplexes  - c/w PT    #DVT ppx - Argatroban gtt    #GOC - Full code    Jae Rousseau MD  Pulmonary & Critical Care
Patient is a 38 yo M w/ class III obesity, pAfib (no AC), HTN, BEA (on CPAP), history of b/l pappiledema attributed to pseudotumor cerebri, who is transferred for VV ECMO for ARDS. Patient initially presented to [place] on 2/24 for SOB and b/l LE edema. As per chart review, patient endorses some degree of SOB and b/l LE edema x 3 months with significant weight gain. As per ICU team, patient had positive sick contacts with viral URI 1 to 2 weeks PTA and since then has had worsening SOB. Patient found to be reportedly hypoxemic to 70% on RA with worsening respiratory status/secretions and somnolence in the ED. Patient was intubated with difficulty (7 attempts). Despite optimal vent management, patient remained hypoxemic. Unable to prone due to obesity. Patient cannulated for VV ECMO on 2/25 and transferred to [facility] for further management.    ECMO decannulated 3/7.    S/P Trach and PEG 3/7    #ARDS  #VV ECMO  #Multifocal Pneumonia  #Shock - Resolved  #pHTN  #ELLA - Resolved  #Class III Obesity  #pAfib - No events on tele this admission  #Acute hypoxemic respiratory failure  #Likely chronic hypercapnic respiratory failure  #HTN  - Wean sedation. Wean Prop and Dilaudid. c/w Precedex gtt  - WIll resume Seroquel when able to use PEG, monitor QTC  - c/w mechanical ventilatory support, tolerating PSV this AM 8/22  - Trach care as per MICU team  - c/w airway clearance  - Monitor blood gases  - Currently still on empiric Zosyn, extended for malodorous bronch secretions. Cultures negative thus far. Will d/c if cultures remain negative  - f/u BAL cultures  - f/u cultures  - Monitor BMP and UOP. Diuresis for net negative. Lasix gtt changed to Lasix 40mg IV q6h  - High PEG output since placement. WIll f/u thoracic prior to starting feeds. Having BMs  - Monitor BP on antihypertensives. Will treat with Amlodipine 5 and Lisnopril 10 when able to use PEG  - f/u post ECMO decannulation duplexes  - c/w PT    #DVT ppx - Argatroban gtt currently held due to oozing from lines and trach site, improved. Will resume if no further bleeding    #GOC - Full code    Jae Rousseau MD  Pulmonary & Critical Care
38 yo M w/ class III obesity, pAfib (no AC), HTN, BEA (on CPAP), history of b/l papilledema attributed to pseudotumor cerebri s/p LP in 2024 with very high opening pressure, who is transferred for VV ECMO for ARDS and severe hypoxia. Patient cannulated for VV ECMO on 2/25 and transferred to [facility] for further management. Palliative care consulted for goals of care and complex medical decision making.     Pt seen this afternoon, intubated, on VV ecmo; family was not present during eval; per chart review patient has a life partner and also mom and brother. Discussed case with micu team; patient primarily with respiratory failure, currently on IV abx for possible PNA and ARDS: off paralytic, weaning sedation s/p bronch x2;    Patient seen in the MICU; father Sunny Quintanilla was present, he had good understanding and coping well  Later called mother, Erica who shared she's trying to be strong and support pt   LNOK: both parents: Erica Quintanilla (mother) [phone], Sunny (Dad) [phone]; Erica gave permission for team to speak with Sunny (dad) and also to Mili Beaver  (pt's aunt /Erica's sister)   Pt also has GF Brea Mari but she would not be legal decision maker;     Palliative team continues to follow for ongoing support for patient on ECMO circuit. Discussed case with MICU team caring for patient who are continuing to provide communication with interdisciplinary teams and patient's family regarding patient's clinical status and medical updates. The goal at this time is to provide continued support in the setting of patient's critical condition ensuring family has appropriate psychosocial support, resources, and assistance with family's grief experience while their loved one remains on the ECMO circuit. Family is aware of palliative care availability and support and encouraged to contact our office for any specific concerns.

## 2025-03-09 NOTE — PROGRESS NOTE ADULT - ASSESSMENT
40 yo M w/ class III obesity, pAfib (no AC), HTN, BEA (on CPAP), history of b/l papilledema attributed to pseudotumor cerebri, who is transferred from [place] on 2/26 for VV ECMO 2/2 ARDS. Patient initially presented to [place] on 2/24 for SOB and b/l LE edema. As per chart review, patient endorses some degree of SOB and b/l LE edema x 3 months with significant weight gain. As per ICU team, patient had positive sick contacts with viral URI 1 to 2 weeks PTA and since then has had worsening SOB. Patient found to be reportedly hypoxemic to 70% on RA with worsening respiratory status/secretions and somnolence in the ED. Patient was a difficult intubation (~6 attempts). Despite optimal vent management, patient remained hypoxemic. Unable to prone due to obesity. Patient cannulated for VV ECMO on 2/25 and transferred to [facility] for further management.     Neuro  #Mental status   #pseudotumor cerebri  - history of pseudotumor cerebri s/p LP under fluoro in 2024 with high opening pressure with MRI 2024 also showed possible pituitary mass but unclear because of pressure effect from pseudotumor cerebri causing partially empty sella. Considering to start acetazolamide outpatient but never started  - prolactin high (30), defer checking cortisol axis given already received steroids but should be investigated, ACTH (<1.5) low but should be suppressed iso steroids  - concern for pituitary adenoma, however TSH .79 normal,  fT4 0.9 normal, and low T3 67  - currently on Dilaudid and Precedex gtt propofol weaned off plan to wean dilaudid today   -patient now awake, calm, communicative, EASLEY, following commands   -currently on seroquel 25mg BID     - paralytics d/c'd 2/27   - PT/OT following     Cardiovascular  #HTN   #Atrial fibrillation   - Hx of a. fib not on AC has not had any episodes during admission   - echo from 2024 did not demonstrate elevation of pulmonary pressures, but now with severe PH suggesting chronicity on TTE at    - TTE on 2/25 showed LV EF 61%, enlarged RV with TAPSE 2.1cm, ePASP at least 49 (Patient had 10/2024 TTE with normal LV and RV with ePASP 14).  - ROBERT 3/1 showing mild TR, enlarged RV with normal LV/RV function. PEEP increased to 24 without signs of RV dilation  - troponins initially elevated, peaked at 162 however downtrended likely from RV dilation and strain - EKG neg for any typical ischemia  - s/p esmolol drip for high flows now D/C'd may need to consider again if need for higher flows  - PEG tube in place will start amlodipine 5mg daily and lisinopril 5mg daily and increase as needed   - ECG daily to monitor QTc  as has been on higher end but will watch closely on seroquel     Pulmonary  #Pulmonary HTN  #ARDS  #Multifocal Pneumonia   #BEA   - history of diagnosed BEA/?OHS intermittent compliance to home PAP  - c/f acute on suspected chronic pulmonary hypertension in setting of possible BEA/OHS c/b pneumonia, ARDS, and pulmonary edema requiring difficult intubation on 2/25 and VVecmo 2/25-3/7. Now s/p tracheostomy 3/7 with weaning trials.   - CTA chest on 2/24 negative for pulmonary embolism, notable for patchy bilateral lower lobe opacities, hepatomegaly, mild ascites, edema/induration of the abdominal pannus.  - Full RVP and BAL cultures negative    - Bronchoscopy demonstrated copious purulent secretions 2/26 and 2/27  - Bronchoscopy 3/1 noted with small amount of old, dried blood at R and L mainstem, not occluding airway  - Bronchoscopy 3/5 with evidence of bleeding in the LLL and the distal trachea/RMSB lateral aspect of the bifurcation. BAL ngtd  - s/p Dexamethasone followed DEXA/ARDS protocol (20mg x5 days then 10mg x5 days)  - C/w duoNeb q6 hours + 3% saline - stop IPV due to ET tube migration and minimal secretion burden  - c/w PC/AC PIP:34  RR 14 PC: 12 PEEP:22 Fio2 40% ~500-900ml tolerating PS trial 8/22   - difficult glottic visualization due to large tongue upon intubation at [facility] 2/25 but able to visualize here easily with S4 video laryngoscope  - S/p Flex bronchoscopy tracheostomy #6 portex proximal XLT, placed 3/7 by CT surgery in OR, CTsx to remove trach sutures   -CTsx to remove ECMO sutures ~10 days post decannulation       Gastrointestinal  #Diet   #Transaminitis - resolving  - tolerated tube feeds via NGT  -s/p Endoscopic percutaneous gastrostomy LUQ PEG 3/7 plan to restart tube feeds and PO meds today   - elevated Tbili likely iso hemolysis 2/2 high ECMO flows, mild transaminitis now improving   - hepatomegaly and mild ascites noted on CT A/P - no pocket to tap  - RUQ ultrasound with steatosis    - c/w bowel regimen with Senna, Miralax and Movantik - having BMs adequately from 3/5 with aggressive regimen titrate bowel regimen as per BMs  - triglycerides have been low despite high doses of propofol   - nutrition following       Gu/Renal  #ELLA  #Hypernatremia   #Non-AG Metabolic Alkalosis  - ELLA - improved, likely ATN/vascular congestion  - good UO, overall net negative  - c/w free h20 250ml q6 hr for hypernatremia    - contraction alkalosis likely 2/2 diuretics, given albumin 250ml x1, acetazolamide 500mg  -c/w Lasix 40mg q6 goal overall net negative lasix gtt discontinued   - monitor urine and electrolytes closely given aggressive diuresis      Infectious disease  #leukocytosis  #Pneumonia   #cellulitis   - Leukocytosis now likely iso steroids, remains afebrile  - s/p cefepime, linezolid, and aztihro 2/25-2/26   - s/p vanco (2/26-3/1) and c/w zosyn for a total of ~10 days (2/26-   ) to cover cellulitis/pneumonia will stop tomorrow if cultures remain negative   - full RVP negative  - BCx x 2 2/24 and 2/26 currently NGTD, UCx 2/25 NGTD, tracheal aspirate Cx from 2/25 with normal commensal kyle   - strep pneumo Ag and urine legionella negative   - s/p Mupiricon b/l nares for +MSSA  (2/26-3/3)  - BAL cultures all with NGTD  - ?penile meatus yellow discharge resolved, GC/chamydia and HIV negative   - candidal intertrigo +/- cellulitis of the pannus - clotrimazole cream 1% BID 2/26 -+ abx as per above    Hematology/DVT ppx  #Anticoagulation   #Thrombocytopenia  #Anemia   - Hb stable, thrombocytopenia likely r/t shearing 2/2 ECMO circuit now improving off circuit   - 4T score low probability of HIT, received heparin at OSH, PF4 neg, SHAWANDA pending  - argatroban on hold post tracheostomy x24 hours, will resume today with goal 50-70 pending official VA duplex post decannulation   - LE duplex 2/25 neg  - s/p 1 u PRBC 2/26 for O2 delivery  -VA duplex to upper and lower ext ordered 3/7    Endocrine  #DM  -A1c 6.7   - obesity with metabolic syndrome  - diabetes with steroid induced hyperglycemia, mild - ISS  -s/p DEXA/ARDS dexamethasone protocol   - pituitary workup largely unremarkable, steroids as above given recent weight gain but likely large component of volume though  - c/w FS Q6h and moderate ISS and adjust if needed now off steroids     SKin/Lines  #Access  #Cellulitis   #DTI with skin sloughing  #Blisters  - L subclavian TLC inserted 2/25-3/8  - L radial artery line 2/25  -Right IJ and Right Fem ECMO cannulas 2/25-3/7  - candidal intertrigo +/- cellulitis of the pannus - clotrimazole cream 1% BID 2/26 -+ abx as per above  - DTI to right buttocks (sloughing of skin) and B/L feet   - wound consult placed   -Blisters noted soles of b/l feet   - podiatry following consulted for  b/l foot blisters- recs vascular consult and on 3/4 lanced bilateral foot serous blister with mild serous drainage now with wound care and f/u out patient   - Vascular consulted b/l blisters likely pressure related, recommending z flow boots for offloading  - No arterial disease and b/l 3 vessel runoff on VA duplex performed 3/5. No indication for further invasive or non-invasive studies        GOC   - full code   - palliative involved  and following while on ECMO   - Mother, father involved in care are next of kin/surrogates and patient's girlfriend/partner is also involved but defers to parents   38 yo M w/ class III obesity, pAfib (no AC), HTN, BEA (on CPAP), history of b/l papilledema attributed to pseudotumor cerebri, who is transferred from [place] on 2/26 for VV ECMO 2/2 ARDS. Patient initially presented to [place] on 2/24 for SOB and b/l LE edema. As per chart review, patient endorses some degree of SOB and b/l LE edema x 3 months with significant weight gain. As per ICU team, patient had positive sick contacts with viral URI 1 to 2 weeks PTA and since then has had worsening SOB. Patient found to be reportedly hypoxemic to 70% on RA with worsening respiratory status/secretions and somnolence in the ED. Patient was a difficult intubation (~6 attempts). Despite optimal vent management, patient remained hypoxemic. Unable to prone due to obesity. Patient cannulated for VV ECMO on 2/25 and transferred to [facility] for further management. Patient now decannulated and s/p trach/PEG placed 3/7. Currently improving, alert and oriented weaning sedation.    Neuro  #Mental status   #pseudotumor cerebri  - history of pseudotumor cerebri s/p LP under fluoro in 2024 with high opening pressure with MRI 2024 also showed possible pituitary mass but unclear because of pressure effect from pseudotumor cerebri causing partially empty sella. Considering to start acetazolamide outpatient but never started  - prolactin high (30), defer checking cortisol axis given already received steroids but should be investigated, ACTH (<1.5) low but should be suppressed iso steroids  - concern for pituitary adenoma, however TSH .79 normal,  fT4 0.9 normal, and low T3 67  - currently on Dilaudid and Precedex gtt propofol weaned off plan to wean Dilaudid today, patient endorsing he doesn't like the feeling of sedatives but understands we have to wean slow   -patient now awake, calm, communicative, EASLEY, following commands   -currently on Seroquel 25mg BID but will switch to QHS so patient isn't feeling sedated throughout the day   - PT/OT following     Cardiovascular  #HTN   #Atrial fibrillation   - Hx of a. fib not on AC has not had any episodes during admission   - echo from 2024 did not demonstrate elevation of pulmonary pressures, but now with severe PH suggesting chronicity on TTE at    - TTE on 2/25 showed LV EF 61%, enlarged RV with TAPSE 2.1cm, ePASP at least 49 (Patient had 10/2024 TTE with normal LV and RV with ePASP 14).  - ROBERT 3/1 showing mild TR, enlarged RV with normal LV/RV function. PEEP increased to 24 without signs of RV dilation  - troponins initially elevated, peaked at 162 however downtrended likely from RV dilation and strain - EKG neg for any typical ischemia  - s/p esmolol drip for high flows now D/C'd may need to consider again if need for higher flows  - c/w amlodipine 5mg daily and lisinopril 5mg daily and increase as needed   - ECG daily to monitor QTc  as has been on higher end but will watch closely on Seroquel     Pulmonary  #Pulmonary HTN  #ARDS  #Multifocal Pneumonia   #BEA   - history of diagnosed BEA/?OHS intermittent compliance to home PAP  - c/f acute on suspected chronic pulmonary hypertension in setting of possible BEA/OHS c/b pneumonia, ARDS, and pulmonary edema requiring difficult intubation on 2/25 and VVecmo 2/25-3/7. Now s/p tracheostomy 3/7 with weaning trials.   - CTA chest on 2/24 negative for pulmonary embolism, notable for patchy bilateral lower lobe opacities, hepatomegaly, mild ascites, edema/induration of the abdominal pannus.  - Full RVP and BAL cultures negative    -serial bronchoscopies for airway clearance and evaluation, last bronch 3/5 with evidence of bleeding in the LLL and the distal trachea/RMSB lateral aspect of the bifurcation. BAL ngtd  - s/p Dexamethasone followed DEXA/ARDS protocol (20mg x5 days then 10mg x5 days)  - C/w duoNeb q6 hours + 3% saline   - c/w PC/AC PIP: 26  RR 14 PC: 12 PEEP: 14 Fio2 40% ~500-900ml tolerating PS trial 5/14 TV ~380-700 continue weaning PEEP as tolerated   - difficult glottic visualization due to large tongue upon intubation at [facility] 2/25 but able to visualize here easily with S4 video laryngoscope  - S/p Flex bronchoscopy tracheostomy #6 portex proximal XLT, placed 3/7 by CT surgery in OR, CTsx to remove trach sutures   -CTsx to remove ECMO sutures ~10 days post decannulation       Gastrointestinal  #Diet   #Transaminitis - resolving  -s/p Endoscopic percutaneous gastrostomy LUQ PEG 3/7   - tolerated tube feeds via PEG    - elevated Tbili likely iso hemolysis 2/2 high ECMO flows, mild transaminitis now improving   - hepatomegaly and mild ascites noted on CT A/P - no pocket to tap  - RUQ ultrasound with steatosis    - c/w bowel regimen with Senna, Miralax and Movantik - having BMs adequately from 3/5 with aggressive regimen titrate bowel regimen as per BMs  - triglycerides have been low despite high doses of propofol will stop trending   - nutrition following       Gu/Renal  #ELLA  #Hypernatremia   #Non-AG Metabolic Alkalosis  - ELLA - improved, likely ATN/vascular congestion  - good UO, overall net negative  - c/w free h20 250ml q6 hr for hypernatremia    - contraction alkalosis likely 2/2 diuretics will hold off on standing lasix and give doses intermittently   - monitor urine and electrolytes closely        Infectious disease  #leukocytosis  #Pneumonia   #cellulitis   - Leukocytosis now likely iso steroids, remains afebrile  - s/p cefepime, linezolid, and aztihro 2/25-2/26   - s/p vanco (2/26-3/1) and zosyn (2/26-3/9) to cover cellulitis/pneumonia  - full RVP negative  - BCx x 2 2/24 and 2/26 currently NGTD, UCx 2/25 NGTD, tracheal aspirate Cx from 2/25 with normal commensal kyle   - strep pneumo Ag and urine legionella negative   - s/p Mupiricon b/l nares for +MSSA  (2/26-3/3)  - BAL cultures all with NGTD  - ?penile meatus yellow discharge resolved, GC/chamydia and HIV negative   - candidal intertrigo +/- cellulitis of the pannus - clotrimazole cream 1% BID 2/26 -+ abx as per above    Hematology/DVT ppx  #Anticoagulation   #Thrombocytopenia  #Anemia   - Hb stable, thrombocytopenia likely r/t shearing 2/2 ECMO circuit now improving off circuit   - 4T score low probability of HIT, received heparin at OSH, PF4 neg, SHAWANDA negative   -c/w argatroban with goal 50-70 pending official VA duplex post decannulation   - LE duplex 2/25 neg  - s/p 1 u PRBC 2/26 for O2 delivery  -VA duplex to upper and lower ext ordered 3/7    Endocrine  #DM  -A1c 6.7   - obesity with metabolic syndrome  - diabetes with steroid induced hyperglycemia, mild - ISS  -s/p DEXA/ARDS dexamethasone protocol   - pituitary workup largely unremarkable, steroids as above given recent weight gain but likely large component of volume though  - c/w FS Q6h and moderate ISS and adjust if needed now off steroids     SKin/Lines  #Access  #Cellulitis   #DTI with skin sloughing  #Blisters  - L subclavian TLC inserted 2/25-3/8  - L radial artery line 2/25  -Right IJ and Right Fem ECMO cannulas 2/25-3/7  - candidal intertrigo +/- cellulitis of the pannus - clotrimazole cream 1% BID 2/26 -+ abx as per above  - DTI to right buttocks (sloughing of skin) and B/L feet Blisters    - podiatry following consulted for  b/l foot blisters- recs vascular consult and on 3/4 lanced bilateral foot serous blister with mild serous drainage now with wound care and f/u out patient   - Vascular consulted b/l blisters likely pressure related, recommending z flow boots for offloading  - No arterial disease and b/l 3 vessel runoff on VA duplex performed 3/5. No indication for further invasive or non-invasive studies    GOC   - full code   - palliative involved following while on ECMO   - Mother, father involved in care are next of kin/surrogates and patient's girlfriend/partner is also involved but defers to parents

## 2025-03-10 LAB
ADD ON TEST-SPECIMEN IN LAB: SIGNIFICANT CHANGE UP
ALBUMIN SERPL ELPH-MCNC: 2.7 G/DL — LOW (ref 3.3–5)
ALBUMIN SERPL ELPH-MCNC: 2.8 G/DL — LOW (ref 3.3–5)
ALBUMIN SERPL ELPH-MCNC: 2.9 G/DL — LOW (ref 3.3–5)
ALP SERPL-CCNC: 53 U/L — SIGNIFICANT CHANGE UP (ref 40–120)
ALP SERPL-CCNC: 55 U/L — SIGNIFICANT CHANGE UP (ref 40–120)
ALP SERPL-CCNC: 64 U/L — SIGNIFICANT CHANGE UP (ref 40–120)
ALT FLD-CCNC: 54 U/L — HIGH (ref 4–41)
ALT FLD-CCNC: 56 U/L — HIGH (ref 4–41)
ALT FLD-CCNC: 63 U/L — HIGH (ref 4–41)
ANION GAP SERPL CALC-SCNC: 7 MMOL/L — SIGNIFICANT CHANGE UP (ref 7–14)
ANION GAP SERPL CALC-SCNC: 7 MMOL/L — SIGNIFICANT CHANGE UP (ref 7–14)
ANION GAP SERPL CALC-SCNC: 9 MMOL/L — SIGNIFICANT CHANGE UP (ref 7–14)
APTT BLD: 46 SEC — HIGH (ref 24.5–35.6)
AST SERPL-CCNC: 37 U/L — SIGNIFICANT CHANGE UP (ref 4–40)
AST SERPL-CCNC: 38 U/L — SIGNIFICANT CHANGE UP (ref 4–40)
AST SERPL-CCNC: 53 U/L — HIGH (ref 4–40)
BASOPHILS # BLD AUTO: 0 K/UL — SIGNIFICANT CHANGE UP (ref 0–0.2)
BASOPHILS NFR BLD AUTO: 0 % — SIGNIFICANT CHANGE UP (ref 0–2)
BILIRUB DIRECT SERPL-MCNC: 0.8 MG/DL — HIGH (ref 0–0.3)
BILIRUB INDIRECT FLD-MCNC: 1.4 MG/DL — HIGH (ref 0–1)
BILIRUB SERPL-MCNC: 2 MG/DL — HIGH (ref 0.2–1.2)
BILIRUB SERPL-MCNC: 2.2 MG/DL — HIGH (ref 0.2–1.2)
BLOOD GAS ARTERIAL - LYTES,HGB,ICA,LACT RESULT: SIGNIFICANT CHANGE UP
BLOOD GAS ARTERIAL - LYTES,HGB,ICA,LACT RESULT: SIGNIFICANT CHANGE UP
BUN SERPL-MCNC: 25 MG/DL — HIGH (ref 7–23)
BUN SERPL-MCNC: 27 MG/DL — HIGH (ref 7–23)
BUN SERPL-MCNC: 28 MG/DL — HIGH (ref 7–23)
CA-I BLD-SCNC: 1.12 MMOL/L — LOW (ref 1.15–1.29)
CA-I BLD-SCNC: 1.12 MMOL/L — LOW (ref 1.15–1.29)
CA-I BLD-SCNC: 1.16 MMOL/L — SIGNIFICANT CHANGE UP (ref 1.15–1.29)
CALCIUM SERPL-MCNC: 8.2 MG/DL — LOW (ref 8.4–10.5)
CALCIUM SERPL-MCNC: 8.4 MG/DL — SIGNIFICANT CHANGE UP (ref 8.4–10.5)
CALCIUM SERPL-MCNC: 8.4 MG/DL — SIGNIFICANT CHANGE UP (ref 8.4–10.5)
CHLORIDE SERPL-SCNC: 107 MMOL/L — SIGNIFICANT CHANGE UP (ref 98–107)
CHLORIDE SERPL-SCNC: 110 MMOL/L — HIGH (ref 98–107)
CHLORIDE SERPL-SCNC: 111 MMOL/L — HIGH (ref 98–107)
CO2 SERPL-SCNC: 29 MMOL/L — SIGNIFICANT CHANGE UP (ref 22–31)
CO2 SERPL-SCNC: 30 MMOL/L — SIGNIFICANT CHANGE UP (ref 22–31)
CO2 SERPL-SCNC: 31 MMOL/L — SIGNIFICANT CHANGE UP (ref 22–31)
CREAT SERPL-MCNC: 0.66 MG/DL — SIGNIFICANT CHANGE UP (ref 0.5–1.3)
CREAT SERPL-MCNC: 0.74 MG/DL — SIGNIFICANT CHANGE UP (ref 0.5–1.3)
CREAT SERPL-MCNC: 0.75 MG/DL — SIGNIFICANT CHANGE UP (ref 0.5–1.3)
EGFR: 119 ML/MIN/1.73M2 — SIGNIFICANT CHANGE UP
EGFR: 119 ML/MIN/1.73M2 — SIGNIFICANT CHANGE UP
EGFR: 120 ML/MIN/1.73M2 — SIGNIFICANT CHANGE UP
EGFR: 120 ML/MIN/1.73M2 — SIGNIFICANT CHANGE UP
EGFR: 124 ML/MIN/1.73M2 — SIGNIFICANT CHANGE UP
EGFR: 124 ML/MIN/1.73M2 — SIGNIFICANT CHANGE UP
EOSINOPHIL # BLD AUTO: 0.12 K/UL — SIGNIFICANT CHANGE UP (ref 0–0.5)
EOSINOPHIL NFR BLD AUTO: 1.3 % — SIGNIFICANT CHANGE UP (ref 0–6)
GLUCOSE BLDC GLUCOMTR-MCNC: 116 MG/DL — HIGH (ref 70–99)
GLUCOSE BLDC GLUCOMTR-MCNC: 121 MG/DL — HIGH (ref 70–99)
GLUCOSE BLDC GLUCOMTR-MCNC: 129 MG/DL — HIGH (ref 70–99)
GLUCOSE BLDC GLUCOMTR-MCNC: 141 MG/DL — HIGH (ref 70–99)
GLUCOSE SERPL-MCNC: 123 MG/DL — HIGH (ref 70–99)
GLUCOSE SERPL-MCNC: 132 MG/DL — HIGH (ref 70–99)
GLUCOSE SERPL-MCNC: 146 MG/DL — HIGH (ref 70–99)
GRAM STN FLD: SIGNIFICANT CHANGE UP
HCT VFR BLD CALC: 32.5 % — LOW (ref 39–50)
HGB BLD-MCNC: 9.3 G/DL — LOW (ref 13–17)
IANC: 6.19 K/UL — SIGNIFICANT CHANGE UP (ref 1.8–7.4)
IMM GRANULOCYTES NFR BLD AUTO: 0.5 % — SIGNIFICANT CHANGE UP (ref 0–0.9)
INR BLD: 1.92 RATIO — HIGH (ref 0.85–1.16)
LYMPHOCYTES # BLD AUTO: 1.61 K/UL — SIGNIFICANT CHANGE UP (ref 1–3.3)
LYMPHOCYTES # BLD AUTO: 17.3 % — SIGNIFICANT CHANGE UP (ref 13–44)
MAGNESIUM SERPL-MCNC: 1.9 MG/DL — SIGNIFICANT CHANGE UP (ref 1.6–2.6)
MAGNESIUM SERPL-MCNC: 2 MG/DL — SIGNIFICANT CHANGE UP (ref 1.6–2.6)
MAGNESIUM SERPL-MCNC: 2 MG/DL — SIGNIFICANT CHANGE UP (ref 1.6–2.6)
MCHC RBC-ENTMCNC: 23.2 PG — LOW (ref 27–34)
MCHC RBC-ENTMCNC: 28.6 G/DL — LOW (ref 32–36)
MCV RBC AUTO: 81 FL — SIGNIFICANT CHANGE UP (ref 80–100)
MONOCYTES # BLD AUTO: 1.35 K/UL — HIGH (ref 0–0.9)
MONOCYTES NFR BLD AUTO: 14.5 % — HIGH (ref 2–14)
NEUTROPHILS # BLD AUTO: 6.19 K/UL — SIGNIFICANT CHANGE UP (ref 1.8–7.4)
NEUTROPHILS NFR BLD AUTO: 66.4 % — SIGNIFICANT CHANGE UP (ref 43–77)
NRBC # BLD AUTO: 0 K/UL — SIGNIFICANT CHANGE UP (ref 0–0)
NRBC # FLD: 0 K/UL — SIGNIFICANT CHANGE UP (ref 0–0)
NRBC BLD AUTO-RTO: 0 /100 WBCS — SIGNIFICANT CHANGE UP (ref 0–0)
PHOSPHATE SERPL-MCNC: 2.8 MG/DL — SIGNIFICANT CHANGE UP (ref 2.5–4.5)
PHOSPHATE SERPL-MCNC: 2.8 MG/DL — SIGNIFICANT CHANGE UP (ref 2.5–4.5)
PHOSPHATE SERPL-MCNC: 3.2 MG/DL — SIGNIFICANT CHANGE UP (ref 2.5–4.5)
PLATELET # BLD AUTO: 122 K/UL — LOW (ref 150–400)
POTASSIUM SERPL-MCNC: 3.5 MMOL/L — SIGNIFICANT CHANGE UP (ref 3.5–5.3)
POTASSIUM SERPL-MCNC: 4 MMOL/L — SIGNIFICANT CHANGE UP (ref 3.5–5.3)
POTASSIUM SERPL-MCNC: 4.1 MMOL/L — SIGNIFICANT CHANGE UP (ref 3.5–5.3)
POTASSIUM SERPL-SCNC: 3.5 MMOL/L — SIGNIFICANT CHANGE UP (ref 3.5–5.3)
POTASSIUM SERPL-SCNC: 4 MMOL/L — SIGNIFICANT CHANGE UP (ref 3.5–5.3)
POTASSIUM SERPL-SCNC: 4.1 MMOL/L — SIGNIFICANT CHANGE UP (ref 3.5–5.3)
PROT SERPL-MCNC: 5.6 G/DL — LOW (ref 6–8.3)
PROT SERPL-MCNC: 5.8 G/DL — LOW (ref 6–8.3)
PROT SERPL-MCNC: 5.9 G/DL — LOW (ref 6–8.3)
PROTHROM AB SERPL-ACNC: 22.7 SEC — HIGH (ref 9.9–13.4)
RBC # BLD: 4.01 M/UL — LOW (ref 4.2–5.8)
RBC # FLD: 21.4 % — HIGH (ref 10.3–14.5)
SODIUM SERPL-SCNC: 145 MMOL/L — SIGNIFICANT CHANGE UP (ref 135–145)
SODIUM SERPL-SCNC: 147 MMOL/L — HIGH (ref 135–145)
SODIUM SERPL-SCNC: 149 MMOL/L — HIGH (ref 135–145)
SPECIMEN SOURCE: SIGNIFICANT CHANGE UP
WBC # BLD: 9.32 K/UL — SIGNIFICANT CHANGE UP (ref 3.8–10.5)
WBC # FLD AUTO: 9.32 K/UL — SIGNIFICANT CHANGE UP (ref 3.8–10.5)

## 2025-03-10 PROCEDURE — 99291 CRITICAL CARE FIRST HOUR: CPT | Mod: GC

## 2025-03-10 RX ORDER — IBUPROFEN 200 MG
600 TABLET ORAL ONCE
Refills: 0 | Status: COMPLETED | OUTPATIENT
Start: 2025-03-10 | End: 2025-03-10

## 2025-03-10 RX ORDER — ACETAMINOPHEN 500 MG/5ML
1000 LIQUID (ML) ORAL ONCE
Refills: 0 | Status: COMPLETED | OUTPATIENT
Start: 2025-03-10 | End: 2025-03-10

## 2025-03-10 RX ORDER — FUROSEMIDE 10 MG/ML
40 INJECTION INTRAMUSCULAR; INTRAVENOUS EVERY 12 HOURS
Refills: 0 | Status: DISCONTINUED | OUTPATIENT
Start: 2025-03-10 | End: 2025-03-12

## 2025-03-10 RX ORDER — MAGNESIUM SULFATE 500 MG/ML
1 SYRINGE (ML) INJECTION ONCE
Refills: 0 | Status: COMPLETED | OUTPATIENT
Start: 2025-03-10 | End: 2025-03-10

## 2025-03-10 RX ORDER — DEXMEDETOMIDINE HYDROCHLORIDE IN SODIUM CHLORIDE 4 UG/ML
0.2 INJECTION INTRAVENOUS
Qty: 400 | Refills: 0 | Status: DISCONTINUED | OUTPATIENT
Start: 2025-03-10 | End: 2025-03-12

## 2025-03-10 RX ORDER — FUROSEMIDE 10 MG/ML
40 INJECTION INTRAMUSCULAR; INTRAVENOUS ONCE
Refills: 0 | Status: COMPLETED | OUTPATIENT
Start: 2025-03-10 | End: 2025-03-10

## 2025-03-10 RX ORDER — HYDROMORPHONE/SOD CHLOR,ISO/PF 2 MG/10 ML
1 SYRINGE (ML) INJECTION ONCE
Refills: 0 | Status: DISCONTINUED | OUTPATIENT
Start: 2025-03-10 | End: 2025-03-10

## 2025-03-10 RX ORDER — SOD PHOS DI, MONO/K PHOS MONO 250 MG
1 TABLET ORAL ONCE
Refills: 0 | Status: COMPLETED | OUTPATIENT
Start: 2025-03-10 | End: 2025-03-10

## 2025-03-10 RX ADMIN — Medication 1 APPLICATION(S): at 05:31

## 2025-03-10 RX ADMIN — QUETIAPINE FUMARATE 25 MILLIGRAM(S): 25 TABLET ORAL at 22:48

## 2025-03-10 RX ADMIN — Medication 4 MILLILITER(S): at 20:21

## 2025-03-10 RX ADMIN — Medication 15 MILLILITER(S): at 17:56

## 2025-03-10 RX ADMIN — Medication 5 MILLILITER(S): at 22:48

## 2025-03-10 RX ADMIN — Medication 4 MILLILITER(S): at 15:40

## 2025-03-10 RX ADMIN — Medication 1000 MILLIGRAM(S): at 16:46

## 2025-03-10 RX ADMIN — Medication 1000 MILLIGRAM(S): at 10:45

## 2025-03-10 RX ADMIN — POLYETHYLENE GLYCOL 3350 17 GRAM(S): 17 POWDER, FOR SOLUTION ORAL at 11:07

## 2025-03-10 RX ADMIN — Medication 500000 UNIT(S): at 05:31

## 2025-03-10 RX ADMIN — Medication 1 MILLIGRAM(S): at 20:15

## 2025-03-10 RX ADMIN — IPRATROPIUM BROMIDE AND ALBUTEROL SULFATE 3 MILLILITER(S): .5; 2.5 SOLUTION RESPIRATORY (INHALATION) at 20:22

## 2025-03-10 RX ADMIN — CLOTRIMAZOLE 1 APPLICATION(S): 1 CREAM TOPICAL at 17:55

## 2025-03-10 RX ADMIN — Medication 4 MILLILITER(S): at 08:35

## 2025-03-10 RX ADMIN — Medication 15 MILLILITER(S): at 05:31

## 2025-03-10 RX ADMIN — Medication 400 MILLIGRAM(S): at 16:24

## 2025-03-10 RX ADMIN — LISINOPRIL 5 MILLIGRAM(S): 5 TABLET ORAL at 11:07

## 2025-03-10 RX ADMIN — IPRATROPIUM BROMIDE AND ALBUTEROL SULFATE 3 MILLILITER(S): .5; 2.5 SOLUTION RESPIRATORY (INHALATION) at 15:39

## 2025-03-10 RX ADMIN — AMLODIPINE BESYLATE 5 MILLIGRAM(S): 10 TABLET ORAL at 05:32

## 2025-03-10 RX ADMIN — Medication 600 MILLIGRAM(S): at 13:09

## 2025-03-10 RX ADMIN — Medication 1 MILLIGRAM(S): at 20:07

## 2025-03-10 RX ADMIN — Medication 1 MILLIGRAM(S): at 22:48

## 2025-03-10 RX ADMIN — FUROSEMIDE 40 MILLIGRAM(S): 10 INJECTION INTRAMUSCULAR; INTRAVENOUS at 17:55

## 2025-03-10 RX ADMIN — Medication 40 MILLIEQUIVALENT(S): at 05:31

## 2025-03-10 RX ADMIN — IPRATROPIUM BROMIDE AND ALBUTEROL SULFATE 3 MILLILITER(S): .5; 2.5 SOLUTION RESPIRATORY (INHALATION) at 03:09

## 2025-03-10 RX ADMIN — Medication 40 MILLIEQUIVALENT(S): at 01:01

## 2025-03-10 RX ADMIN — Medication 15 MILLILITER(S): at 11:08

## 2025-03-10 RX ADMIN — Medication 500000 UNIT(S): at 11:07

## 2025-03-10 RX ADMIN — Medication 600 MILLIGRAM(S): at 13:47

## 2025-03-10 RX ADMIN — CLOTRIMAZOLE 1 APPLICATION(S): 1 CREAM TOPICAL at 05:32

## 2025-03-10 RX ADMIN — Medication 400 MILLIGRAM(S): at 03:52

## 2025-03-10 RX ADMIN — Medication 400 MILLIGRAM(S): at 09:55

## 2025-03-10 RX ADMIN — Medication 500000 UNIT(S): at 17:55

## 2025-03-10 RX ADMIN — Medication 4 MILLILITER(S): at 03:10

## 2025-03-10 RX ADMIN — Medication 100 GRAM(S): at 05:30

## 2025-03-10 RX ADMIN — Medication 1 MILLIGRAM(S): at 23:00

## 2025-03-10 RX ADMIN — Medication 500000 UNIT(S): at 01:00

## 2025-03-10 RX ADMIN — FUROSEMIDE 40 MILLIGRAM(S): 10 INJECTION INTRAMUSCULAR; INTRAVENOUS at 01:01

## 2025-03-10 RX ADMIN — ARGATROBAN 20.2 MICROGRAM(S)/KG/MIN: 100 INJECTION, SOLUTION INTRAVENOUS at 07:40

## 2025-03-10 RX ADMIN — Medication 1000 MILLIGRAM(S): at 04:22

## 2025-03-10 RX ADMIN — IPRATROPIUM BROMIDE AND ALBUTEROL SULFATE 3 MILLILITER(S): .5; 2.5 SOLUTION RESPIRATORY (INHALATION) at 08:35

## 2025-03-10 RX ADMIN — Medication 1 TABLET(S): at 05:31

## 2025-03-10 NOTE — PROGRESS NOTE ADULT - SUBJECTIVE AND OBJECTIVE BOX
Interval Events:   -lasix 40mg IVP given at 0100  -febrile to 101   -weaned off dilaudid     HPI:  38 yo M w/ class III obesity, pAfib (no AC), HTN, BEA (on CPAP), history of b/l papilledema attributed to pseudotumor cerebri s/p LP in 2024 with very high opening pressure, who is transferred for VV ECMO for ARDS and severe hypoxia. Patient initially presented to [facility] on 2/24 for SOB and b/l LE edema. 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 He is a current every day smoker of Newports and marijuana, but does not drink or do other drugs. Currently lives with his mother. There is a long term partner in his life, but they are not , and they have an on/off relationship currently not very involved with each other as per the patient's mother and aunt.  At [facility] where he was getting an eval last year for shortness of breath, he had a sleep study and was diagnosed with sleep apnea, prescribed a PAP machine, which he has in his room but the mother is not sure how many nights per week he uses it. Recently, the last day or two, his mother and brother have been under the weather with URIs and the patient 2 days ago started to develop a ruynny nose, ough, some wheezing of the chest, as well as worsening shortness of breath and fevers at home. He called his aunt who told him to go get checked out and then he landed at the [facility] ED. Patient found to be reportedly hypoxemic to 70% on RA with worsening respiratory status/secretions and somnolence in the ED. Patient was intubated with difficulty (7 attempts) due to very large tongue secretions. Despite optimal vent management, patient remained hypoxemic. Unable to prone due to obesity. Patient cannulated for VV ECMO on 2/25 and transferred to [facility] for further management.     [facility] labs notable for MRSA PCR -, Fluvid -, urine legionella -, sputum gram stain  with GPC and GPR    CTA chest on 2/24 negative for pulmonary embolism, notable for patchy bilateral lower lobe opacities, hepatomegaly, mild ascites, edema/induration of the abdominal pannus.    LE duplex on 2/25 negative for DVT    TTE on 2/25 showed LV EF 61%, enlarged RV with TAPSE 2.1cm, ePASP at least 49 (Patient had 10/2024 TTE with normal LV and RV with ePASP 14).    Only home med is Lasix. Not on acetazolamide for pseudotumor or on Wegovy (was pending auth) (26 Feb 2025 01:48)      REVIEW OF SYSTEMS:  Constitutional: [ ] negative [ ] fevers [ ] chills [ ] weight loss [ ] weight gain  HEENT: [ ] negative [ ] dry eyes [ ] eye irritation [ ] postnasal drip [ ] nasal congestion  CV: [ ] negative  [ ] chest pain [ ] orthopnea [ ] palpitations [ ] murmur  Resp: [ ] negative [ ] cough [ ] shortness of breath [ ] dyspnea [ ] wheezing [ ] sputum [ ] hemoptysis  GI: [ ] negative [ ] nausea [ ] vomiting [ ] diarrhea [ ] constipation [ ] abd pain [ ] dysphagia   : [ ] negative [ ] dysuria [ ] nocturia [ ] hematuria [ ] increased urinary frequency  Musculoskeletal: [ ] negative [ ] back pain [ ] myalgias [ ] arthralgias [ ] fracture  Skin: [ ] negative [ ] rash [ ] itch  Neurological: [ ] negative [ ] headache [ ] dizziness [ ] syncope [ ] weakness [ ] numbness  Psychiatric: [ ] negative [ ] anxiety [ ] depression  Endocrine: [ ] negative [ ] diabetes [ ] thyroid problem  Hematologic/Lymphatic: [ ] negative [ ] anemia [ ] bleeding problem  Allergic/Immunologic: [ ] negative [ ] itchy eyes [ ] nasal discharge [ ] hives [ ] angioedema  [ ] All other systems negative  [ ] Unable to assess ROS because ________    OBJECTIVE:  ICU Vital Signs Last 24 Hrs  T(C): 38.2 (10 Mar 2025 04:00), Max: 38.4 (09 Mar 2025 20:00)  T(F): 100.8 (10 Mar 2025 04:00), Max: 101.1 (09 Mar 2025 20:00)  HR: 90 (10 Mar 2025 06:00) (80 - 108)  BP: --  BP(mean): --  ABP: 155/58 (10 Mar 2025 06:00) (111/31 - 162/67)  ABP(mean): 82 (10 Mar 2025 06:00) (55 - 91)  RR: 25 (10 Mar 2025 06:00) (0 - 30)  SpO2: 100% (10 Mar 2025 06:00) (97% - 100%)    O2 Parameters below as of 10 Mar 2025 06:00  Patient On (Oxygen Delivery Method): ventilator    O2 Concentration (%): 30      Mode: CPAP with PS, FiO2: 30, PEEP: 14, PS: 5, MAP: 17, PIP: 21    03-08 @ 06:01  -  03-09 @ 07:00  --------------------------------------------------------  IN: 5121.1 mL / OUT: 5910 mL / NET: -788.9 mL    03-09 @ 07:01  -  03-10 @ 06:34  --------------------------------------------------------  IN: 4151.5 mL / OUT: 3155 mL / NET: 996.5 mL      CAPILLARY BLOOD GLUCOSE      POCT Blood Glucose.: 129 mg/dL (10 Mar 2025 05:29)    Physical Exam:   Constitutional: ill appearing, no acute distress   HEENT: + PERRLA, EOMI, no drainage or redness  Neck: supple,  No JVD, trach midline   Respiratory: Ventilator assisted breath Sounds diminished bilaterally to auscultation, no accessory muscle use noted  Cardiovascular: Regular rate, regular rhythm, normal S1, S2; no murmurs or rub  Gastrointestinal: Obese, soft, non distended, no hepatosplenomegaly, normal bowel sounds  Extremities: + 2 peripheral edema, no cyanosis, no clubbing   Vascular: Equal and normal pulses: 2+ peripheral pulses throughout  Neurological: alert and oriented, can mouth words   Musculoskeletal: No joint swelling or deformity; no limitation of movement  Skin: warm, dry, well perfused, cellulitis to pannus area, DTI to right buttocks, plantar blisters b/l feet        HOSPITAL MEDICATIONS:  Standing Meds:  albuterol/ipratropium for Nebulization 3 milliLiter(s) Nebulizer every 6 hours  amLODIPine   Tablet 5 milliGRAM(s) Oral daily  argatroban Infusion 1.4 MICROgram(s)/kG/Min IV Continuous <Continuous>  chlorhexidine 0.12% Liquid 15 milliLiter(s) Oral Mucosa every 12 hours  chlorhexidine 2% Cloths 1 Application(s) Topical <User Schedule>  clotrimazole 1% Cream 1 Application(s) Topical two times a day  dexMEDEtomidine Infusion 1.5 MICROgram(s)/kG/Hr IV Continuous <Continuous>  dextrose 50% Injectable 25 Gram(s) IV Push once  dextrose 50% Injectable 12.5 Gram(s) IV Push once  dextrose 50% Injectable 25 Gram(s) IV Push once  glucagon  Injectable 1 milliGRAM(s) IntraMuscular once  HYDROmorphone Infusion. 0.8 mG/Hr IV Continuous <Continuous>  insulin lispro (ADMELOG) corrective regimen sliding scale   SubCutaneous every 6 hours  lisinopril 5 milliGRAM(s) Oral every 24 hours  multivitamin/minerals/iron Oral Solution (CENTRUM) 15 milliLiter(s) Oral daily  naloxegol 12.5 milliGRAM(s) Oral daily  nystatin    Suspension 579465 Unit(s) Oral every 6 hours  polyethylene glycol 3350 17 Gram(s) Oral daily  QUEtiapine 25 milliGRAM(s) Oral at bedtime  senna Syrup 5 milliLiter(s) Oral at bedtime  sodium chloride 3%  Inhalation 4 milliLiter(s) Inhalation every 6 hours      PRN Meds:      LABS:                        9.3    9.32  )-----------( 122      ( 10 Mar 2025 00:46 )             32.5     Hgb Trend: 9.3<--, 9.7<--, 10.0<--, 10.6<--, 10.7<--  03-10    145  |  107  |  28[H]  ----------------------------<  123[H]  3.5   |  31  |  0.74    Ca    8.2[L]      10 Mar 2025 00:46  Phos  2.8     03-10  Mg     1.90     03-10    TPro  5.6[L]  /  Alb  2.7[L]  /  TBili  2.0[H]  /  DBili  x   /  AST  37  /  ALT  56[H]  /  AlkPhos  53  03-10    Creatinine Trend: 0.74<--, 0.74<--, 0.79<--, 0.88<--, 0.89<--, 0.87<--  PT/INR - ( 10 Mar 2025 00:46 )   PT: 22.7 sec;   INR: 1.92 ratio         PTT - ( 10 Mar 2025 00:46 )  PTT:46.0 sec  Urinalysis Basic - ( 10 Mar 2025 00:46 )    Color: x / Appearance: x / SG: x / pH: x  Gluc: 123 mg/dL / Ketone: x  / Bili: x / Urobili: x   Blood: x / Protein: x / Nitrite: x   Leuk Esterase: x / RBC: x / WBC x   Sq Epi: x / Non Sq Epi: x / Bacteria: x      Arterial Blood Gas:  03-10 @ 00:46  7.46/50/97/36/98.4/10.5  ABG lactate: --  Arterial Blood Gas:  03-09 @ 03:48  7.40/66/99/41/98.2/13.8  ABG lactate: --  Arterial Blood Gas:  03-08 @ 21:47  7.43/60/124/40/98.4/13.4  ABG lactate: --  Arterial Blood Gas:  03-08 @ 11:30  7.44/58/90/39/98.8/13.1  ABG lactate: --               Interval Events:   -lasix 40mg IVP given at 0100  -febrile to 101 and coughing up secretions   -weaned off dilaudid     HPI:  38 yo M w/ class III obesity, pAfib (no AC), HTN, BEA (on CPAP), history of b/l papilledema attributed to pseudotumor cerebri s/p LP in 2024 with very high opening pressure, who is transferred for VV ECMO for ARDS and severe hypoxia. Patient initially presented to [facility] on 2/24 for SOB and b/l LE edema. 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 He is a current every day smoker of Newports and marijuana, but does not drink or do other drugs. Currently lives with his mother. There is a long term partner in his life, but they are not , and they have an on/off relationship currently not very involved with each other as per the patient's mother and aunt.  At [facility] where he was getting an eval last year for shortness of breath, he had a sleep study and was diagnosed with sleep apnea, prescribed a PAP machine, which he has in his room but the mother is not sure how many nights per week he uses it. Recently, the last day or two, his mother and brother have been under the weather with URIs and the patient 2 days ago started to develop a ruynny nose, ough, some wheezing of the chest, as well as worsening shortness of breath and fevers at home. He called his aunt who told him to go get checked out and then he landed at the [facility] ED. Patient found to be reportedly hypoxemic to 70% on RA with worsening respiratory status/secretions and somnolence in the ED. Patient was intubated with difficulty (7 attempts) due to very large tongue secretions. Despite optimal vent management, patient remained hypoxemic. Unable to prone due to obesity. Patient cannulated for VV ECMO on 2/25 and transferred to [facility] for further management.     [facility] labs notable for MRSA PCR -, Fluvid -, urine legionella -, sputum gram stain  with GPC and GPR    CTA chest on 2/24 negative for pulmonary embolism, notable for patchy bilateral lower lobe opacities, hepatomegaly, mild ascites, edema/induration of the abdominal pannus.    LE duplex on 2/25 negative for DVT    TTE on 2/25 showed LV EF 61%, enlarged RV with TAPSE 2.1cm, ePASP at least 49 (Patient had 10/2024 TTE with normal LV and RV with ePASP 14).    Only home med is Lasix. Not on acetazolamide for pseudotumor or on Wegovy (was pending auth) (26 Feb 2025 01:48)      REVIEW OF SYSTEMS:  Constitutional: [ ] negative [ ] fevers [ ] chills [ ] weight loss [ ] weight gain  HEENT: [ ] negative [ ] dry eyes [ ] eye irritation [ ] postnasal drip [ ] nasal congestion  CV: [ ] negative  [ ] chest pain [ ] orthopnea [ ] palpitations [ ] murmur  Resp: [ ] negative [x ] cough [ ] shortness of breath [ ] dyspnea [ ] wheezing [ ] sputum [ ] hemoptysis  GI: [ ] negative [ ] nausea [ ] vomiting [ ] diarrhea [ ] constipation [ ] abd pain [ ] dysphagia   : [ ] negative [ ] dysuria [ ] nocturia [ ] hematuria [ ] increased urinary frequency  Musculoskeletal: [ ] negative [ ] back pain [ ] myalgias [ ] arthralgias [ ] fracture  Skin: [ ] negative [ ] rash [ ] itch  Neurological: [ ] negative [ ] headache [ ] dizziness [ ] syncope [ ] weakness [ ] numbness  Psychiatric: [ ] negative [ ] anxiety [ ] depression  Endocrine: [ ] negative [ ] diabetes [ ] thyroid problem  Hematologic/Lymphatic: [ ] negative [ ] anemia [ ] bleeding problem  Allergic/Immunologic: [ ] negative [ ] itchy eyes [ ] nasal discharge [ ] hives [ ] angioedema  [ x] All other systems negative  [ ] Unable to assess ROS because ________    OBJECTIVE:  ICU Vital Signs Last 24 Hrs  T(C): 38.2 (10 Mar 2025 04:00), Max: 38.4 (09 Mar 2025 20:00)  T(F): 100.8 (10 Mar 2025 04:00), Max: 101.1 (09 Mar 2025 20:00)  HR: 90 (10 Mar 2025 06:00) (80 - 108)  BP: --  BP(mean): --  ABP: 155/58 (10 Mar 2025 06:00) (111/31 - 162/67)  ABP(mean): 82 (10 Mar 2025 06:00) (55 - 91)  RR: 25 (10 Mar 2025 06:00) (0 - 30)  SpO2: 100% (10 Mar 2025 06:00) (97% - 100%)    O2 Parameters below as of 10 Mar 2025 06:00  Patient On (Oxygen Delivery Method): ventilator    O2 Concentration (%): 30      Mode: CPAP with PS, FiO2: 30, PEEP: 14, PS: 5, MAP: 17, PIP: 21    03-08 @ 06:01  -  03-09 @ 07:00  --------------------------------------------------------  IN: 5121.1 mL / OUT: 5910 mL / NET: -788.9 mL    03-09 @ 07:01  -  03-10 @ 06:34  --------------------------------------------------------  IN: 4151.5 mL / OUT: 3155 mL / NET: 996.5 mL      CAPILLARY BLOOD GLUCOSE      POCT Blood Glucose.: 129 mg/dL (10 Mar 2025 05:29)    Physical Exam:   Constitutional: ill appearing, no acute distress   HEENT: + PERRLA, EOMI, no drainage or redness  Neck: supple,  No JVD, trach midline   Respiratory: Ventilator assisted breath Sounds diminished bilaterally to auscultation, no accessory muscle use noted  Cardiovascular: Regular rate, regular rhythm, normal S1, S2; no murmurs or rub  Gastrointestinal: Obese, soft, non distended, no hepatosplenomegaly, normal bowel sounds  Extremities: + 2 peripheral edema, no cyanosis, no clubbing   Vascular: Equal and normal pulses: 2+ peripheral pulses throughout  Neurological: alert and oriented, can mouth words   Musculoskeletal: No joint swelling or deformity; no limitation of movement  Skin: warm, dry, well perfused, cellulitis to pannus area, DTI to right buttocks, plantar blisters b/l feet        HOSPITAL MEDICATIONS:  Standing Meds:  albuterol/ipratropium for Nebulization 3 milliLiter(s) Nebulizer every 6 hours  amLODIPine   Tablet 5 milliGRAM(s) Oral daily  argatroban Infusion 1.4 MICROgram(s)/kG/Min IV Continuous <Continuous>  chlorhexidine 0.12% Liquid 15 milliLiter(s) Oral Mucosa every 12 hours  chlorhexidine 2% Cloths 1 Application(s) Topical <User Schedule>  clotrimazole 1% Cream 1 Application(s) Topical two times a day  dexMEDEtomidine Infusion 1.5 MICROgram(s)/kG/Hr IV Continuous <Continuous>  dextrose 50% Injectable 25 Gram(s) IV Push once  dextrose 50% Injectable 12.5 Gram(s) IV Push once  dextrose 50% Injectable 25 Gram(s) IV Push once  glucagon  Injectable 1 milliGRAM(s) IntraMuscular once  HYDROmorphone Infusion. 0.8 mG/Hr IV Continuous <Continuous>  insulin lispro (ADMELOG) corrective regimen sliding scale   SubCutaneous every 6 hours  lisinopril 5 milliGRAM(s) Oral every 24 hours  multivitamin/minerals/iron Oral Solution (CENTRUM) 15 milliLiter(s) Oral daily  naloxegol 12.5 milliGRAM(s) Oral daily  nystatin    Suspension 080419 Unit(s) Oral every 6 hours  polyethylene glycol 3350 17 Gram(s) Oral daily  QUEtiapine 25 milliGRAM(s) Oral at bedtime  senna Syrup 5 milliLiter(s) Oral at bedtime  sodium chloride 3%  Inhalation 4 milliLiter(s) Inhalation every 6 hours      PRN Meds:      LABS:                        9.3    9.32  )-----------( 122      ( 10 Mar 2025 00:46 )             32.5     Hgb Trend: 9.3<--, 9.7<--, 10.0<--, 10.6<--, 10.7<--  03-10    145  |  107  |  28[H]  ----------------------------<  123[H]  3.5   |  31  |  0.74    Ca    8.2[L]      10 Mar 2025 00:46  Phos  2.8     03-10  Mg     1.90     03-10    TPro  5.6[L]  /  Alb  2.7[L]  /  TBili  2.0[H]  /  DBili  x   /  AST  37  /  ALT  56[H]  /  AlkPhos  53  03-10    Creatinine Trend: 0.74<--, 0.74<--, 0.79<--, 0.88<--, 0.89<--, 0.87<--  PT/INR - ( 10 Mar 2025 00:46 )   PT: 22.7 sec;   INR: 1.92 ratio         PTT - ( 10 Mar 2025 00:46 )  PTT:46.0 sec  Urinalysis Basic - ( 10 Mar 2025 00:46 )    Color: x / Appearance: x / SG: x / pH: x  Gluc: 123 mg/dL / Ketone: x  / Bili: x / Urobili: x   Blood: x / Protein: x / Nitrite: x   Leuk Esterase: x / RBC: x / WBC x   Sq Epi: x / Non Sq Epi: x / Bacteria: x      Arterial Blood Gas:  03-10 @ 00:46  7.46/50/97/36/98.4/10.5  ABG lactate: --  Arterial Blood Gas:  03-09 @ 03:48  7.40/66/99/41/98.2/13.8  ABG lactate: --  Arterial Blood Gas:  03-08 @ 21:47  7.43/60/124/40/98.4/13.4  ABG lactate: --  Arterial Blood Gas:  03-08 @ 11:30  7.44/58/90/39/98.8/13.1  ABG lactate: --

## 2025-03-10 NOTE — PROGRESS NOTE ADULT - ASSESSMENT
38 yo M w/ class III obesity, pAfib (no AC), HTN, BEA (on CPAP), history of b/l papilledema attributed to pseudotumor cerebri, who is transferred from [place] on 2/26 for VV ECMO 2/2 ARDS. Patient initially presented to [place] on 2/24 for SOB and b/l LE edema. As per chart review, patient endorses some degree of SOB and b/l LE edema x 3 months with significant weight gain. As per ICU team, patient had positive sick contacts with viral URI 1 to 2 weeks PTA and since then has had worsening SOB. Patient found to be reportedly hypoxemic to 70% on RA with worsening respiratory status/secretions and somnolence in the ED. Patient was a difficult intubation (~6 attempts). Despite optimal vent management, patient remained hypoxemic. Unable to prone due to obesity. Patient cannulated for VV ECMO on 2/25 and transferred to [facility] for further management. Patient now decannulated and s/p trach/PEG placed 3/7. Currently improving, alert and oriented weaning sedation.    Neuro  #Mental status   #pseudotumor cerebri  - history of pseudotumor cerebri s/p LP under fluoro in 2024 with high opening pressure with MRI 2024 also showed possible pituitary mass but unclear because of pressure effect from pseudotumor cerebri causing partially empty sella. Considering to start acetazolamide outpatient but never started  - prolactin high (30), defer checking cortisol axis given already received steroids but should be investigated, ACTH (<1.5) low but should be suppressed iso steroids  - concern for pituitary adenoma, however TSH .79 normal,  fT4 0.9 normal, and low T3 67  - currently on Dilaudid and Precedex gtt propofol weaned off plan to wean Dilaudid today, patient endorsing he doesn't like the feeling of sedatives but understands we have to wean slow   -patient now awake, calm, communicative, EASLEY, following commands   -currently on Seroquel 25mg BID but will switch to QHS so patient isn't feeling sedated throughout the day   - PT/OT following     Cardiovascular  #HTN   #Atrial fibrillation   - Hx of a. fib not on AC has not had any episodes during admission   - echo from 2024 did not demonstrate elevation of pulmonary pressures, but now with severe PH suggesting chronicity on TTE at    - TTE on 2/25 showed LV EF 61%, enlarged RV with TAPSE 2.1cm, ePASP at least 49 (Patient had 10/2024 TTE with normal LV and RV with ePASP 14).  - ROBERT 3/1 showing mild TR, enlarged RV with normal LV/RV function. PEEP increased to 24 without signs of RV dilation  - troponins initially elevated, peaked at 162 however downtrended likely from RV dilation and strain - EKG neg for any typical ischemia  - s/p esmolol drip for high flows now D/C'd may need to consider again if need for higher flows  - c/w amlodipine 5mg daily and lisinopril 5mg daily and increase as needed   - ECG daily to monitor QTc  as has been on higher end but will watch closely on Seroquel     Pulmonary  #Pulmonary HTN  #ARDS  #Multifocal Pneumonia   #BEA   - history of diagnosed BEA/?OHS intermittent compliance to home PAP  - c/f acute on suspected chronic pulmonary hypertension in setting of possible BEA/OHS c/b pneumonia, ARDS, and pulmonary edema requiring difficult intubation on 2/25 and VVecmo 2/25-3/7. Now s/p tracheostomy 3/7 with weaning trials.   - CTA chest on 2/24 negative for pulmonary embolism, notable for patchy bilateral lower lobe opacities, hepatomegaly, mild ascites, edema/induration of the abdominal pannus.  - Full RVP and BAL cultures negative    -serial bronchoscopies for airway clearance and evaluation, last bronch 3/5 with evidence of bleeding in the LLL and the distal trachea/RMSB lateral aspect of the bifurcation. BAL ngtd  - s/p Dexamethasone followed DEXA/ARDS protocol (20mg x5 days then 10mg x5 days)  - C/w duoNeb q6 hours + 3% saline   - c/w PC/AC PIP: 26  RR 14 PC: 12 PEEP: 14 Fio2 40% ~500-900ml tolerating PS trial 5/14 TV ~380-700 continue weaning PEEP as tolerated   - difficult glottic visualization due to large tongue upon intubation at [facility] 2/25 but able to visualize here easily with S4 video laryngoscope  - S/p Flex bronchoscopy tracheostomy #6 portex proximal XLT, placed 3/7 by CT surgery in OR, CTsx to remove trach sutures   -CTsx to remove ECMO sutures ~10 days post decannulation       Gastrointestinal  #Diet   #Transaminitis - resolving  -s/p Endoscopic percutaneous gastrostomy LUQ PEG 3/7   - tolerated tube feeds via PEG    - elevated Tbili likely iso hemolysis 2/2 high ECMO flows, mild transaminitis now improving   - hepatomegaly and mild ascites noted on CT A/P - no pocket to tap  - RUQ ultrasound with steatosis    - c/w bowel regimen with Senna, Miralax and Movantik - having BMs adequately from 3/5 with aggressive regimen titrate bowel regimen as per BMs  - triglycerides have been low despite high doses of propofol will stop trending   - nutrition following       Gu/Renal  #ELLA  #Hypernatremia   #Non-AG Metabolic Alkalosis  - ELLA - improved, likely ATN/vascular congestion  - good UO, overall net negative  - c/w free h20 250ml q6 hr for hypernatremia    - contraction alkalosis likely 2/2 diuretics will hold off on standing lasix and give doses intermittently   - monitor urine and electrolytes closely        Infectious disease  #leukocytosis  #Pneumonia   #cellulitis   - Leukocytosis now likely iso steroids, remains afebrile  - s/p cefepime, linezolid, and aztihro 2/25-2/26   - s/p vanco (2/26-3/1) and zosyn (2/26-3/9) to cover cellulitis/pneumonia  - full RVP negative  - BCx x 2 2/24 and 2/26 currently NGTD, UCx 2/25 NGTD, tracheal aspirate Cx from 2/25 with normal commensal kyle   - strep pneumo Ag and urine legionella negative   - s/p Mupiricon b/l nares for +MSSA  (2/26-3/3)  - BAL cultures all with NGTD  - ?penile meatus yellow discharge resolved, GC/chamydia and HIV negative   - candidal intertrigo +/- cellulitis of the pannus - clotrimazole cream 1% BID 2/26 -+ abx as per above    Hematology/DVT ppx  #Anticoagulation   #Thrombocytopenia  #Anemia   - Hb stable, thrombocytopenia likely r/t shearing 2/2 ECMO circuit now improving off circuit   - 4T score low probability of HIT, received heparin at OSH, PF4 neg, SHAWANDA negative   -c/w argatroban with goal 50-70 pending official VA duplex post decannulation   - LE duplex 2/25 neg  - s/p 1 u PRBC 2/26 for O2 delivery  -VA duplex to upper and lower ext ordered 3/7    Endocrine  #DM  -A1c 6.7   - obesity with metabolic syndrome  - diabetes with steroid induced hyperglycemia, mild - ISS  -s/p DEXA/ARDS dexamethasone protocol   - pituitary workup largely unremarkable, steroids as above given recent weight gain but likely large component of volume though  - c/w FS Q6h and moderate ISS and adjust if needed now off steroids     SKin/Lines  #Access  #Cellulitis   #DTI with skin sloughing  #Blisters  - L subclavian TLC inserted 2/25-3/8  - L radial artery line 2/25  -Right IJ and Right Fem ECMO cannulas 2/25-3/7  - candidal intertrigo +/- cellulitis of the pannus - clotrimazole cream 1% BID 2/26 -+ abx as per above  - DTI to right buttocks (sloughing of skin) and B/L feet Blisters    - podiatry following consulted for  b/l foot blisters- recs vascular consult and on 3/4 lanced bilateral foot serous blister with mild serous drainage now with wound care and f/u out patient   - Vascular consulted b/l blisters likely pressure related, recommending z flow boots for offloading  - No arterial disease and b/l 3 vessel runoff on VA duplex performed 3/5. No indication for further invasive or non-invasive studies    GOC   - full code   - palliative involved following while on ECMO   - Mother, father involved in care are next of kin/surrogates and patient's girlfriend/partner is also involved but defers to parents   38 yo M w/ class III obesity, pAfib (no AC), HTN, BEA (on CPAP), history of b/l papilledema attributed to pseudotumor cerebri, who is transferred from [place] on 2/26 for VV ECMO 2/2 ARDS. Patient initially presented to [place] on 2/24 for SOB and b/l LE edema. As per chart review, patient endorses some degree of SOB and b/l LE edema x 3 months with significant weight gain. As per ICU team, patient had positive sick contacts with viral URI 1 to 2 weeks PTA and since then has had worsening SOB. Patient found to be reportedly hypoxemic to 70% on RA with worsening respiratory status/secretions and somnolence in the ED. Patient was a difficult intubation (~6 attempts). Despite optimal vent management, patient remained hypoxemic. Unable to prone due to obesity. Patient cannulated for VV ECMO on 2/25 and transferred to [facility] for further management. Patient now decannulated and s/p trach/PEG placed 3/7. Currently improving, alert and oriented weaning sedation.    Neuro  #Mental status   #pseudotumor cerebri  - history of pseudotumor cerebri s/p LP under fluoro in 2024 with high opening pressure with MRI 2024 also showed possible pituitary mass but unclear because of pressure effect from pseudotumor cerebri causing partially empty sella. Considering to start acetazolamide outpatient but never started  - prolactin high (30), defer checking cortisol axis given already received steroids but should be investigated, ACTH (<1.5) low but should be suppressed iso steroids  - concern for pituitary adenoma, however TSH .79 normal,  fT4 0.9 normal, and low T3 67  -weaned off Dilaudid this AM now on low dose Precedex with plan to wean as tolerated  -may consider clonidine to assist with precedex wean iso HTN    -patient now awake, calm, communicative, EASLEY, following commands   -currently on Seroquel 25mg QHS    - PT/OT following OOB via hailey lift today     Cardiovascular  #HTN   #Atrial fibrillation   - Hx of a. fib not on AC has not had any episodes during admission   - echo from 2024 did not demonstrate elevation of pulmonary pressures, but now with severe PH suggesting chronicity on TTE at    - TTE on 2/25 showed LV EF 61%, enlarged RV with TAPSE 2.1cm, ePASP at least 49 (Patient had 10/2024 TTE with normal LV and RV with ePASP 14).  - ROBERT 3/1 showing mild TR, enlarged RV with normal LV/RV function. PEEP increased to 24 without signs of RV dilation  - troponins initially elevated, peaked at 162 however downtrended likely from RV dilation and strain - EKG neg for any typical ischemia  - s/p esmolol drip for high flows now D/C'd may need to consider again if need for higher flows  - c/w amlodipine 5mg daily and lisinopril 5mg daily and increase as needed   - ECG daily to monitor QTc  as has been on higher end but will watch closely on Seroquel  -pending repeat TTE ordered 3/10    Pulmonary  #Pulmonary HTN  #ARDS  #Multifocal Pneumonia   #BEA   - history of diagnosed BEA/?OHS intermittent compliance to home PAP  - c/f acute on suspected chronic pulmonary hypertension in setting of possible BEA/OHS c/b pneumonia, ARDS, and pulmonary edema requiring difficult intubation on 2/25 and VVecmo 2/25-3/7. Now s/p tracheostomy 3/7 with weaning trials.   - CTA chest on 2/24 negative for pulmonary embolism, notable for patchy bilateral lower lobe opacities, hepatomegaly, mild ascites, edema/induration of the abdominal pannus.  - Full RVP and BAL cultures negative    -serial bronchoscopies for airway clearance and evaluation, last bronch 3/5 with evidence of bleeding in the LLL and the distal trachea/RMSB lateral aspect of the bifurcation. BAL ngtd  - s/p Dexamethasone followed DEXA/ARDS protocol (20mg x5 days then 10mg x5 days)  - C/w duoNeb q6 hours + 3% saline   -tolerating PS trial 5/12 TV ~380-700 will try patient on trach collar today   - difficult glottic visualization due to large tongue upon intubation at [facility] 2/25 but able to visualize here easily with S4 video laryngoscope  - S/p Flex bronchoscopy tracheostomy #6 portex proximal XLT, placed 3/7 by CT surgery in OR, CTsx to remove trach sutures   -CTsx to remove ECMO sutures ~10 days post decannulation       Gastrointestinal  #Diet   #Transaminitis - resolving  #constipation-resolved   -s/p Endoscopic percutaneous gastrostomy LUQ PEG 3/7   - tolerated tube feeds via PEG    - elevated Tbili likely iso hemolysis 2/2 high ECMO flows, mild transaminitis now improving   - hepatomegaly and mild ascites noted on CT A/P - no pocket to tap  - RUQ ultrasound with steatosis    - c/w bowel regimen with Senna and Miralax flexiseal in place   - triglycerides have been low despite high doses of propofol will stop trending   - nutrition following       Gu/Renal  #ELLA  #Hypernatremia   #Non-AG Metabolic Alkalosis  - ELLA - improved, likely ATN/vascular congestion  - good UO, overall net negative  - c/w free h20 250ml q6 hr for hypernatremia    -was holding lasix due to contraction alkalosis but will start standing lasix today 40mg BID   - monitor urine and electrolytes closely        Infectious disease  #leukocytosis  #Pneumonia   #cellulitis   #febrile   - intermittent mild Leukocytosis, febrile post decannulation but c/o increased sputum, will send sputum culture today   - s/p cefepime, linezolid, and aztihro 2/25-2/26   - s/p vanco (2/26-3/1) and zosyn (2/26-3/9) to cover cellulitis/pneumonia  - full RVP negative  - BCx x 2 2/24 and 2/26 currently NGTD, UCx 2/25 NGTD, tracheal aspirate Cx from 2/25 with normal commensal kyle   - strep pneumo Ag and urine legionella negative   - s/p Mupiricon b/l nares for +MSSA  (2/26-3/3)  - BAL cultures all with NGTD  - ?penile meatus yellow discharge resolved, GC/chamydia and HIV negative   - candidal intertrigo +/- cellulitis of the pannus - clotrimazole cream 1% BID 2/26 -+ abx as per above    Hematology/DVT ppx  #Anticoagulation   #Thrombocytopenia  #Anemia   - Hb stable, thrombocytopenia likely r/t shearing 2/2 ECMO circuit now improving off circuit   - 4T score low probability of HIT, received heparin at OSH, PF4 neg, SHAWANDA negative   -c/w argatroban with goal 50-70 pending official VA duplex post decannulation   - LE duplex 2/25 neg  - s/p 1 u PRBC 2/26 for O2 delivery  -VA duplex to upper and lower ext negative for DVT 3/9 will continue argatroban until repeat VA duplex in 7 days 3/17    Endocrine  #DM  -A1c 6.7   - obesity with metabolic syndrome  - diabetes with steroid induced hyperglycemia, mild - ISS  -s/p DEXA/ARDS dexamethasone protocol   - pituitary workup largely unremarkable, steroids as above given recent weight gain but likely large component of volume though  - c/w FS Q6h and moderate ISS and adjust if needed now off steroids     SKin/Lines  #Access  #Cellulitis   #DTI with skin sloughing  #Blisters  - L subclavian TLC inserted 2/25-3/8  - L radial artery line 2/25  -Right IJ and Right Fem ECMO cannulas 2/25-3/7  - candidal intertrigo +/- cellulitis of the pannus - clotrimazole cream 1% BID 2/26 -+ abx as per above  - DTI to right buttocks (sloughing of skin) and B/L feet Blisters    - podiatry following consulted for  b/l foot blisters- recs vascular consult and on 3/4 lanced bilateral foot serous blister with mild serous drainage now with wound care and f/u out patient   - Vascular consulted b/l blisters likely pressure related, recommending z flow boots for offloading  - No arterial disease and b/l 3 vessel runoff on VA duplex performed 3/5. No indication for further invasive or non-invasive studies    GOC   - full code   - palliative involved was following while on ECMO   - Mother, father involved in care are next of kin/surrogates and patient's girlfriend/partner is also involved but defers to parents

## 2025-03-10 NOTE — CHART NOTE - NSCHARTNOTEFT_GEN_A_CORE
Palliative team following patient on ECMO circuit  Patient now s/p trach and PEG; off ECMO  Will sign off at this time, reconsult as needed.     In the event of worsening symptoms, please contact the Palliative Medicine team via pager (if the patient is at [facility] #[phone] or if the patient is at [facility] #[phone]) The Geriatric and Palliative Medicine service has coverage 24 hours a day/ 7 days a week to provide medical recommendations regarding symptom management needs via telephone Palliative team following patient on ECMO circuit  Patient now s/p trach and PEG; off ECMO (decannulated 3/7)  Will sign off at this time, reconsult as needed.     In the event of worsening symptoms, please contact the Palliative Medicine team via pager (if the patient is at [facility] #[phone] or if the patient is at [facility] #[phone]) The Geriatric and Palliative Medicine service has coverage 24 hours a day/ 7 days a week to provide medical recommendations regarding symptom management needs via telephone

## 2025-03-10 NOTE — PROGRESS NOTE ADULT - CRITICAL CARE ATTENDING COMMENT
40 yo M w/ class III obesity, pAfib (no AC), HTN, BEA (on CPAP), history of b/l pappiledema attributed to pseudotumor cerebri, who is transferred for VV ECMO for ARDS. Patient initially presented to [place] on 2/24 for SOB and b/l LE edema. As per chart review, patient endorses some degree of SOB and b/l LE edema x 3 months with significant weight gain. As per ICU team, patient had positive sick contacts with viral URI 1 to 2 weeks PTA and since then has had worsening SOB. Patient found to be reportedly hypoxemic to 70% on RA with worsening respiratory status/secretions and somnolence in the ED. Patient was intubated with difficulty (7 attempts). Despite optimal vent management, patient remained hypoxemic. Unable to prone due to obesity. Patient cannulated for VV ECMO on 2/25 and transferred to [facility] for further management.  S/P Trach and PEG 3/7  ECMO decannulated 3/7.    doing well today - tolerating PS trials    #ARDS  #VV ECMO  #Multifocal Pneumonia  #Shock - Resolved  #pHTN  #ELLA - Resolved  #Class III Obesity  #pAfib - No events on tele this admission  #Acute hypoxemic respiratory failure  #Likely chronic hypercapnic respiratory failure  #HTN  - wean precedex today, will try clonidine if needed  - c/w Seroquel 25 qhs for now, monitor QTC  - c/w mechanical ventilatory support, tolerating PSV this AM 5/12. Wean PEEP as tolerated  - Trach care as per MICU team  - c/w airway clearance  - Monitor blood gases  - D/C Zosyn. Monitor off abx for now  - Monitor BMP and UOP. Got diuretics overnight; will do lasix 40 mg po bid for now  - Tolerating trickle feeds, increase as tolerated  - Monitor BP on antihypertensives  - f/u post ECMO decannulation duplexes - will need empiric a/c for 7 days post decannulation  - c/w PT    #DVT ppx - Argatroban gtt    #GOC - Full code

## 2025-03-10 NOTE — CHART NOTE - NSCHARTNOTEFT_GEN_A_CORE
______________ CRITICAL CARE NUTRITION FOLLOW-UP ________________        --Medical Course--  39y  Male with Hx of class III , HTN, pseudotumor cerebri.  Pt. presented with hypoxia, intubated.    Cannulated for VV-ECMO & transferred [facility].    Pt. also with DM, and findings of pulmonary HTN in setting of possible BEA/OHS, c/b PNA, ARDS and pulmonary edema.   CT abd/pelvis shoes hepatomegaly, mild ascites and edema.  Receiving intermittent diuresis.      Pt. now s/p trach/PEG.  Last received ~905 KCals from Propofol yesterday (3/9).  Propofol now d/c'd and Pt. no longer sedated.  Remains on VV-ECMO, however.        --Nutrition Course--  Pt. with 32.2kg, significant 13.37% weight decrease since admission (12d).  This is likely related somewhat to fluid related changes considering VV-ECMO and intermittent diuresis as TF + Propofol has met and possible exceeded estimated caloric requirements.   Discussed with ECMO NP.  Pt. reported as ~24-26L net negative since admission.      Has been receiving Peptamen VHP at prescribed goal of 35mL/hr x 24hrs + Liquid Protein Supplement (LPS).  Current enteral regimen, with consideration that Propofol d/c'd, now suboptimally meets caloric estimates, yet adequately meets estimated [increased] protein needs.      At this time, advise changing enteral formula to [employer] Peptide 1.5 with eventual goal of 60mL/hr x 18hrs (11a-5a) with 4 packets of Liquid Protein Supplement (LPS) daily to provide   1080mL total volume   1620 Kcals (+ 400 KCals from LPS) = 2020 KCals   80g protein (+ 60g additional protein via LPS)= 140g total protein    756mL free water (+ 1000mL additional free water flushes) = 1756mL free water      TF+ LPS will give 30 KCals/kg Ideal Body Weight & ~2.1g protein/kg Ideal Body Weight.               __________Current Diet Order_________  Diet, NPO with Tube Feed:   Tube Feeding Modality: Orogastric  Peptamen Very High Protein (VHP)  Continuous  Until Goal Tube Feed Rate (mL per Hour): 35  Tube Feed Duration (in Hours): 24  Tube Feed Start Time: 15:00  Liquid Protein Supplement     Qty per Day:  4 (03-08-25 @ 09:42) [Active]      provides:  840mL volume,   840 KCals (+ 400 KCals from LPS) = 1240 KCals  77g protein (+ 60g additional protein from LPS) = 137g protein  672mL free water (+ 250mL H2O q 6hrs)= 1672mL free water      Weight:      Height:   67"  170.2cm           Ideal Body Weight: 148lbs / 67.3kg  208.6kg (3/10)  221.6kg (3/4)  233.9kg (2/28)  240.8kg (2/26 on admit)      _____Estimated Energy Needs (based on IBW)_____  22-25 KCals/kg = 8249-8242 KCals/d  2.0-2.5g protein/kg = 134-168g protein/d        Edema: 3+ generalized    Skin: Right buttock suspected deep tissue injury, b/l feet arterial wounds    ________________________Pertinent Medications____________   MEDICATIONS  (STANDING):  albuterol/ipratropium for Nebulization 3 milliLiter(s) Nebulizer every 6 hours  amLODIPine   Tablet 5 milliGRAM(s) Oral daily  argatroban Infusion 1.4 MICROgram(s)/kG/Min (20.2 mL/Hr) IV Continuous <Continuous>  chlorhexidine 0.12% Liquid 15 milliLiter(s) Oral Mucosa every 12 hours  chlorhexidine 2% Cloths 1 Application(s) Topical <User Schedule>  clotrimazole 1% Cream 1 Application(s) Topical two times a day  dexMEDEtomidine Infusion 0.2 MICROgram(s)/kG/Hr (12 mL/Hr) IV Continuous <Continuous>  dextrose 50% Injectable 25 Gram(s) IV Push once  dextrose 50% Injectable 12.5 Gram(s) IV Push once  dextrose 50% Injectable 25 Gram(s) IV Push once  glucagon  Injectable 1 milliGRAM(s) IntraMuscular once  insulin lispro (ADMELOG) corrective regimen sliding scale   SubCutaneous every 6 hours  lisinopril 5 milliGRAM(s) Oral every 24 hours  multivitamin/minerals/iron Oral Solution (CENTRUM) 15 milliLiter(s) Oral daily  naloxegol 12.5 milliGRAM(s) Oral daily  nystatin    Suspension 365280 Unit(s) Oral every 6 hours  polyethylene glycol 3350 17 Gram(s) Oral daily  QUEtiapine 25 milliGRAM(s) Oral at bedtime  senna Syrup 5 milliLiter(s) Oral at bedtime  sodium chloride 3%  Inhalation 4 milliLiter(s) Inhalation every 6 hours    MEDICATIONS  (PRN):          _________________________Pertinent Labs____________________     03-10    147[H]  |  110[H]  |  27[H]  ----------------------------<  132[H]  4.1   |  30  |  0.75    Ca    8.4      10 Mar 2025 08:00  Phos  3.2     03-10  Mg     2.00     03-10    TPro  5.8[L]  /  Alb  2.8[L]  /  TBili  2.2[H]  /  DBili  x   /  AST  38  /  ALT  54[H]  /  AlkPhos  55  03-10                                                                   9.3    9.32  )-----------( 122      ( 10 Mar 2025 00:46 )             32.5         CAPILLARY BLOOD GLUCOSE    POCT Blood Glucose.: 129 mg/dL (03-10-25 @ 05:29)  POCT Blood Glucose.: 116 mg/dL (03-10-25 @ 00:44)  POCT Blood Glucose.: 115 mg/dL (03-09-25 @ 17:36)  POCT Blood Glucose.: 120 mg/dL (03-09-25 @ 11:17)      Triglycerides, Serum: 169 mg/dL (03-08-25 @ 00:18)  Triglycerides, Serum: 207 mg/dL (02-26-25 @ 11:15)      A1C with Estimated Average Glucose Result: 6.7% (02.25.25 @ 05:44)        PLAN/RECOMMENDATIONS:    1)  2) Obtain daily weights  3) Monitor tolerance to tube feeding, GI status, electrolytes, glucose    RDN remains available and will f/u PRN.          Linette Escobar, RDN, CDN       pager [phone] or MS Teams ______________ CRITICAL CARE NUTRITION FOLLOW-UP ________________        --Medical Course--  39y  Male with Hx of class III , HTN, pseudotumor cerebri.  Pt. presented with hypoxia, intubated.    Cannulated for VV-ECMO & transferred from [facility] (2/25) to [facility] for further management.    Pt. also with DM, and findings of pulmonary HTN in setting of possible BEA/OHS, c/b PNA, ARDS and pulmonary edema.   CT abd/pelvis shoes hepatomegaly, mild ascites and edema.  Receiving intermittent diuresis.      Pt. now s/p trach/PEG.  Last received ~905 KCals from Propofol yesterday (3/9).  Propofol now d/c'd and Pt. no longer sedated.  Remains on VV-ECMO, however.        --Nutrition Course--  Pt. with 32.2kg, significant 13.37% weight decrease since admission (12d).  This is likely related somewhat to fluid related changes considering VV-ECMO and intermittent diuresis as TF + Propofol has met and possible exceeded estimated caloric requirements.   Discussed with ECMO NP.  Pt. reported as ~24-26L net negative since admission.      Has been receiving Peptamen VHP at prescribed goal of 35mL/hr x 24hrs + Liquid Protein Supplement (LPS).  Current enteral regimen, with consideration that Propofol d/c'd, now suboptimally meets caloric estimates, yet adequately meets estimated [increased] protein needs.      At this time, advise changing enteral formula to [employer] Peptide 1.5 with eventual goal of 60mL/hr x 18hrs (11a-5a) with 4 packets of Liquid Protein Supplement (LPS) daily to provide   1080mL total volume   1620 Kcals (+ 400 KCals from LPS) = 2020 KCals   80g protein (+ 60g additional protein via LPS)= 140g total protein    756mL free water (+ 1000mL additional free water flushes) = 1756mL free water      TF+ LPS will give 30 KCals/kg Ideal Body Weight & ~2.1g protein/kg Ideal Body Weight.         Met with Pt.  RN @ bedside.  Pt. alert and oriented, although unable to speak due to trach.  Communicates via writing.  RDN explained role of tube feeding.    Pt. without any c/o N/V or abdominal discomfort @ this time.  Observed with liquid stool output (~200mL output in past 24hrs, per flow sheets).    Discussed with RN and patient recommended change in enteral formula and increased rate, as well as to change to 18hr duration.  Pt. inquiring as to how much longer he will need tube feeding.  Explained this is dependent as to if/when Pt. may able to be advanced to PO diet.  Pt. nods in understanding of this information.    Pt. without any overt physical findings of muscle wasting or subcutaneous fat loss.        __________Current Diet Order_________  Diet, NPO with Tube Feed:   Tube Feeding Modality: Orogastric  Peptamen Very High Protein (VHP)  Continuous  Until Goal Tube Feed Rate (mL per Hour): 35  Tube Feed Duration (in Hours): 24  Tube Feed Start Time: 15:00  Liquid Protein Supplement     Qty per Day:  4 (03-08-25 @ 09:42) [Active]      provides:  840mL volume,   840 KCals (+ 400 KCals from LPS) = 1240 KCals  77g protein (+ 60g additional protein from LPS) = 137g protein  672mL free water (+ 250mL H2O q 6hrs)= 1672mL free water      Weight:      Height:   67"  170.2cm           Ideal Body Weight: 148lbs / 67.3kg  208.6kg (3/10)  221.6kg (3/4)  233.9kg (2/28)  240.8kg (2/26 on admit)      _____Estimated Energy Needs (based on IBW)_____  22-25 KCals/kg = 6806-1589 KCals/d  2.0-2.5g protein/kg = 134-168g protein/d        Edema: 2+ generalized, per discussion with NP    Skin: Right buttock suspected deep tissue injury, b/l feet arterial wounds    ________________________Pertinent Medications____________   MEDICATIONS  (STANDING):  albuterol/ipratropium for Nebulization 3 milliLiter(s) Nebulizer every 6 hours  amLODIPine   Tablet 5 milliGRAM(s) Oral daily  argatroban Infusion 1.4 MICROgram(s)/kG/Min (20.2 mL/Hr) IV Continuous <Continuous>  chlorhexidine 0.12% Liquid 15 milliLiter(s) Oral Mucosa every 12 hours  chlorhexidine 2% Cloths 1 Application(s) Topical <User Schedule>  clotrimazole 1% Cream 1 Application(s) Topical two times a day  dexMEDEtomidine Infusion 0.2 MICROgram(s)/kG/Hr (12 mL/Hr) IV Continuous <Continuous>  dextrose 50% Injectable 25 Gram(s) IV Push once  dextrose 50% Injectable 12.5 Gram(s) IV Push once  dextrose 50% Injectable 25 Gram(s) IV Push once  glucagon  Injectable 1 milliGRAM(s) IntraMuscular once  insulin lispro (ADMELOG) corrective regimen sliding scale   SubCutaneous every 6 hours  lisinopril 5 milliGRAM(s) Oral every 24 hours  multivitamin/minerals/iron Oral Solution (CENTRUM) 15 milliLiter(s) Oral daily  naloxegol 12.5 milliGRAM(s) Oral daily  nystatin    Suspension 812593 Unit(s) Oral every 6 hours  polyethylene glycol 3350 17 Gram(s) Oral daily  QUEtiapine 25 milliGRAM(s) Oral at bedtime  senna Syrup 5 milliLiter(s) Oral at bedtime  sodium chloride 3%  Inhalation 4 milliLiter(s) Inhalation every 6 hours    MEDICATIONS  (PRN):          _________________________Pertinent Labs____________________     03-10    147[H]  |  110[H]  |  27[H]  ----------------------------<  132[H]  4.1   |  30  |  0.75    Ca    8.4      10 Mar 2025 08:00  Phos  3.2     03-10  Mg     2.00     03-10    TPro  5.8[L]  /  Alb  2.8[L]  /  TBili  2.2[H]  /  DBili  x   /  AST  38  /  ALT  54[H]  /  AlkPhos  55  03-10                                                                   9.3    9.32  )-----------( 122      ( 10 Mar 2025 00:46 )             32.5         CAPILLARY BLOOD GLUCOSE    POCT Blood Glucose.: 129 mg/dL (03-10-25 @ 05:29)  POCT Blood Glucose.: 116 mg/dL (03-10-25 @ 00:44)  POCT Blood Glucose.: 115 mg/dL (03-09-25 @ 17:36)  POCT Blood Glucose.: 120 mg/dL (03-09-25 @ 11:17)      Triglycerides, Serum: 169 mg/dL (03-08-25 @ 00:18)  Triglycerides, Serum: 207 mg/dL (02-26-25 @ 11:15)      A1C with Estimated Average Glucose Result: 6.7% (02.25.25 @ 05:44)        PLAN/RECOMMENDATIONS:    1) D/C Peptamen VHP.  Initiate [employer]'s Peptide 1.5 @ 35mL/hr.  Increase by 10mL q 4hrs, as tolerated, to goal of 60mL/hr x 18hrs (11a-5a) + 4 packet Liquid Protein Supplement (LPS) per day    2) Obtain daily weights  3) Monitor tolerance to tube feeding, GI status, electrolytes, glucose    RDN remains available and will f/u PRN.          Linette Escobar, JULIETA, CDN       pager [phone] or MS Teams

## 2025-03-11 ENCOUNTER — RESULT REVIEW (OUTPATIENT)
Age: 40
End: 2025-03-11

## 2025-03-11 LAB
ALBUMIN SERPL ELPH-MCNC: 2.7 G/DL — LOW (ref 3.3–5)
ALBUMIN SERPL ELPH-MCNC: 2.9 G/DL — LOW (ref 3.3–5)
ALP SERPL-CCNC: 61 U/L — SIGNIFICANT CHANGE UP (ref 40–120)
ALP SERPL-CCNC: 63 U/L — SIGNIFICANT CHANGE UP (ref 40–120)
ALT FLD-CCNC: 53 U/L — HIGH (ref 4–41)
ALT FLD-CCNC: 55 U/L — HIGH (ref 4–41)
ANION GAP SERPL CALC-SCNC: 7 MMOL/L — SIGNIFICANT CHANGE UP (ref 7–14)
ANION GAP SERPL CALC-SCNC: 8 MMOL/L — SIGNIFICANT CHANGE UP (ref 7–14)
ANISOCYTOSIS BLD QL: SLIGHT — SIGNIFICANT CHANGE UP
APTT BLD: 43.8 SEC — HIGH (ref 24.5–35.6)
APTT BLD: 47.3 SEC — HIGH (ref 24.5–35.6)
AST SERPL-CCNC: 35 U/L — SIGNIFICANT CHANGE UP (ref 4–40)
AST SERPL-CCNC: 38 U/L — SIGNIFICANT CHANGE UP (ref 4–40)
BASOPHILS # BLD AUTO: 0 K/UL — SIGNIFICANT CHANGE UP (ref 0–0.2)
BASOPHILS # BLD AUTO: 0.01 K/UL — SIGNIFICANT CHANGE UP (ref 0–0.2)
BASOPHILS NFR BLD AUTO: 0 % — SIGNIFICANT CHANGE UP (ref 0–2)
BASOPHILS NFR BLD AUTO: 0.1 % — SIGNIFICANT CHANGE UP (ref 0–2)
BILIRUB SERPL-MCNC: 1.7 MG/DL — HIGH (ref 0.2–1.2)
BILIRUB SERPL-MCNC: 1.8 MG/DL — HIGH (ref 0.2–1.2)
BLOOD GAS ARTERIAL - LYTES,HGB,ICA,LACT RESULT: SIGNIFICANT CHANGE UP
BLOOD GAS ARTERIAL - LYTES,HGB,ICA,LACT RESULT: SIGNIFICANT CHANGE UP
BUN SERPL-MCNC: 18 MG/DL — SIGNIFICANT CHANGE UP (ref 7–23)
BUN SERPL-MCNC: 22 MG/DL — SIGNIFICANT CHANGE UP (ref 7–23)
CA-I BLD-SCNC: 1.14 MMOL/L — LOW (ref 1.15–1.29)
CA-I BLD-SCNC: 1.15 MMOL/L — SIGNIFICANT CHANGE UP (ref 1.15–1.29)
CALCIUM SERPL-MCNC: 8.3 MG/DL — LOW (ref 8.4–10.5)
CALCIUM SERPL-MCNC: 8.5 MG/DL — SIGNIFICANT CHANGE UP (ref 8.4–10.5)
CHLORIDE SERPL-SCNC: 111 MMOL/L — HIGH (ref 98–107)
CHLORIDE SERPL-SCNC: 112 MMOL/L — HIGH (ref 98–107)
CO2 SERPL-SCNC: 29 MMOL/L — SIGNIFICANT CHANGE UP (ref 22–31)
CO2 SERPL-SCNC: 29 MMOL/L — SIGNIFICANT CHANGE UP (ref 22–31)
CREAT SERPL-MCNC: 0.6 MG/DL — SIGNIFICANT CHANGE UP (ref 0.5–1.3)
CREAT SERPL-MCNC: 0.66 MG/DL — SIGNIFICANT CHANGE UP (ref 0.5–1.3)
DACRYOCYTES BLD QL SMEAR: SLIGHT — SIGNIFICANT CHANGE UP
EGFR: 124 ML/MIN/1.73M2 — SIGNIFICANT CHANGE UP
EGFR: 124 ML/MIN/1.73M2 — SIGNIFICANT CHANGE UP
EGFR: 128 ML/MIN/1.73M2 — SIGNIFICANT CHANGE UP
EGFR: 128 ML/MIN/1.73M2 — SIGNIFICANT CHANGE UP
EOSINOPHIL # BLD AUTO: 0 K/UL — SIGNIFICANT CHANGE UP (ref 0–0.5)
EOSINOPHIL # BLD AUTO: 0.11 K/UL — SIGNIFICANT CHANGE UP (ref 0–0.5)
EOSINOPHIL NFR BLD AUTO: 0 % — SIGNIFICANT CHANGE UP (ref 0–6)
EOSINOPHIL NFR BLD AUTO: 0.7 % — SIGNIFICANT CHANGE UP (ref 0–6)
GIANT PLATELETS BLD QL SMEAR: PRESENT — SIGNIFICANT CHANGE UP
GLUCOSE BLDC GLUCOMTR-MCNC: 114 MG/DL — HIGH (ref 70–99)
GLUCOSE BLDC GLUCOMTR-MCNC: 131 MG/DL — HIGH (ref 70–99)
GLUCOSE BLDC GLUCOMTR-MCNC: 133 MG/DL — HIGH (ref 70–99)
GLUCOSE BLDC GLUCOMTR-MCNC: 143 MG/DL — HIGH (ref 70–99)
GLUCOSE SERPL-MCNC: 121 MG/DL — HIGH (ref 70–99)
GLUCOSE SERPL-MCNC: 142 MG/DL — HIGH (ref 70–99)
HCT VFR BLD CALC: 31.9 % — LOW (ref 39–50)
HCT VFR BLD CALC: 33.2 % — LOW (ref 39–50)
HGB BLD-MCNC: 9.1 G/DL — LOW (ref 13–17)
HGB BLD-MCNC: 9.4 G/DL — LOW (ref 13–17)
IANC: 10.54 K/UL — HIGH (ref 1.8–7.4)
IANC: 12.77 K/UL — HIGH (ref 1.8–7.4)
IMM GRANULOCYTES NFR BLD AUTO: 0.8 % — SIGNIFICANT CHANGE UP (ref 0–0.9)
INR BLD: 1.86 RATIO — HIGH (ref 0.85–1.16)
INR BLD: 1.89 RATIO — HIGH (ref 0.85–1.16)
LYMPHOCYTES # BLD AUTO: 1.29 K/UL — SIGNIFICANT CHANGE UP (ref 1–3.3)
LYMPHOCYTES # BLD AUTO: 1.37 K/UL — SIGNIFICANT CHANGE UP (ref 1–3.3)
LYMPHOCYTES # BLD AUTO: 10 % — LOW (ref 13–44)
LYMPHOCYTES # BLD AUTO: 8.1 % — LOW (ref 13–44)
MACROCYTES BLD QL: SLIGHT — SIGNIFICANT CHANGE UP
MAGNESIUM SERPL-MCNC: 1.9 MG/DL — SIGNIFICANT CHANGE UP (ref 1.6–2.6)
MAGNESIUM SERPL-MCNC: 2 MG/DL — SIGNIFICANT CHANGE UP (ref 1.6–2.6)
MANUAL SMEAR VERIFICATION: SIGNIFICANT CHANGE UP
MCHC RBC-ENTMCNC: 23.3 PG — LOW (ref 27–34)
MCHC RBC-ENTMCNC: 23.6 PG — LOW (ref 27–34)
MCHC RBC-ENTMCNC: 28.3 G/DL — LOW (ref 32–36)
MCHC RBC-ENTMCNC: 28.5 G/DL — LOW (ref 32–36)
MCV RBC AUTO: 82.4 FL — SIGNIFICANT CHANGE UP (ref 80–100)
MCV RBC AUTO: 82.6 FL — SIGNIFICANT CHANGE UP (ref 80–100)
MONOCYTES # BLD AUTO: 0.96 K/UL — HIGH (ref 0–0.9)
MONOCYTES # BLD AUTO: 1.53 K/UL — HIGH (ref 0–0.9)
MONOCYTES NFR BLD AUTO: 7 % — SIGNIFICANT CHANGE UP (ref 2–14)
MONOCYTES NFR BLD AUTO: 9.7 % — SIGNIFICANT CHANGE UP (ref 2–14)
NEUTROPHILS # BLD AUTO: 10.93 K/UL — HIGH (ref 1.8–7.4)
NEUTROPHILS # BLD AUTO: 12.77 K/UL — HIGH (ref 1.8–7.4)
NEUTROPHILS NFR BLD AUTO: 80 % — HIGH (ref 43–77)
NEUTROPHILS NFR BLD AUTO: 80.6 % — HIGH (ref 43–77)
NRBC # BLD AUTO: 0 K/UL — SIGNIFICANT CHANGE UP (ref 0–0)
NRBC # BLD: 0 /100 WBCS — SIGNIFICANT CHANGE UP (ref 0–0)
NRBC # FLD: 0 K/UL — SIGNIFICANT CHANGE UP (ref 0–0)
NRBC BLD AUTO-RTO: 0 /100 WBCS — SIGNIFICANT CHANGE UP (ref 0–0)
NRBC BLD-RTO: 0 /100 WBCS — SIGNIFICANT CHANGE UP (ref 0–0)
PHOSPHATE SERPL-MCNC: 2.4 MG/DL — LOW (ref 2.5–4.5)
PHOSPHATE SERPL-MCNC: 2.5 MG/DL — SIGNIFICANT CHANGE UP (ref 2.5–4.5)
PLAT MORPH BLD: NORMAL — SIGNIFICANT CHANGE UP
PLATELET # BLD AUTO: 133 K/UL — LOW (ref 150–400)
PLATELET # BLD AUTO: 152 K/UL — SIGNIFICANT CHANGE UP (ref 150–400)
PLATELET COUNT - ESTIMATE: ABNORMAL
POIKILOCYTOSIS BLD QL AUTO: SLIGHT — SIGNIFICANT CHANGE UP
POLYCHROMASIA BLD QL SMEAR: SLIGHT — SIGNIFICANT CHANGE UP
POTASSIUM SERPL-MCNC: 3.7 MMOL/L — SIGNIFICANT CHANGE UP (ref 3.5–5.3)
POTASSIUM SERPL-MCNC: 3.7 MMOL/L — SIGNIFICANT CHANGE UP (ref 3.5–5.3)
POTASSIUM SERPL-SCNC: 3.7 MMOL/L — SIGNIFICANT CHANGE UP (ref 3.5–5.3)
POTASSIUM SERPL-SCNC: 3.7 MMOL/L — SIGNIFICANT CHANGE UP (ref 3.5–5.3)
PROT SERPL-MCNC: 5.8 G/DL — LOW (ref 6–8.3)
PROT SERPL-MCNC: 6 G/DL — SIGNIFICANT CHANGE UP (ref 6–8.3)
PROTHROM AB SERPL-ACNC: 21.4 SEC — HIGH (ref 9.9–13.4)
PROTHROM AB SERPL-ACNC: 22.3 SEC — HIGH (ref 9.9–13.4)
RBC # BLD: 3.86 M/UL — LOW (ref 4.2–5.8)
RBC # BLD: 4.03 M/UL — LOW (ref 4.2–5.8)
RBC # FLD: 22.1 % — HIGH (ref 10.3–14.5)
RBC # FLD: 22.3 % — HIGH (ref 10.3–14.5)
RBC BLD AUTO: ABNORMAL
SODIUM SERPL-SCNC: 148 MMOL/L — HIGH (ref 135–145)
SODIUM SERPL-SCNC: 148 MMOL/L — HIGH (ref 135–145)
VARIANT LYMPHS # BLD: 3 % — SIGNIFICANT CHANGE UP (ref 0–6)
VARIANT LYMPHS NFR BLD MANUAL: 3 % — SIGNIFICANT CHANGE UP (ref 0–6)
WBC # BLD: 13.66 K/UL — HIGH (ref 3.8–10.5)
WBC # BLD: 15.83 K/UL — HIGH (ref 3.8–10.5)
WBC # FLD AUTO: 13.66 K/UL — HIGH (ref 3.8–10.5)
WBC # FLD AUTO: 15.83 K/UL — HIGH (ref 3.8–10.5)

## 2025-03-11 PROCEDURE — 99233 SBSQ HOSP IP/OBS HIGH 50: CPT

## 2025-03-11 PROCEDURE — 93306 TTE W/DOPPLER COMPLETE: CPT | Mod: 26

## 2025-03-11 PROCEDURE — 99291 CRITICAL CARE FIRST HOUR: CPT | Mod: GC

## 2025-03-11 RX ORDER — PIPERACILLIN-TAZO-DEXTROSE,ISO 2.25G/50ML
3.38 IV SOLUTION, PIGGYBACK PREMIX FROZEN(ML) INTRAVENOUS ONCE
Refills: 0 | Status: COMPLETED | OUTPATIENT
Start: 2025-03-11 | End: 2025-03-11

## 2025-03-11 RX ORDER — ARGATROBAN 100 MG/ML
2 INJECTION, SOLUTION INTRAVENOUS
Qty: 250 | Refills: 0 | Status: DISCONTINUED | OUTPATIENT
Start: 2025-03-11 | End: 2025-03-16

## 2025-03-11 RX ORDER — HYDROMORPHONE/SOD CHLOR,ISO/PF 2 MG/10 ML
1 SYRINGE (ML) INJECTION ONCE
Refills: 0 | Status: DISCONTINUED | OUTPATIENT
Start: 2025-03-11 | End: 2025-03-11

## 2025-03-11 RX ORDER — QUETIAPINE FUMARATE 25 MG/1
25 TABLET ORAL ONCE
Refills: 0 | Status: COMPLETED | OUTPATIENT
Start: 2025-03-11 | End: 2025-03-11

## 2025-03-11 RX ORDER — PIPERACILLIN-TAZO-DEXTROSE,ISO 2.25G/50ML
3.38 IV SOLUTION, PIGGYBACK PREMIX FROZEN(ML) INTRAVENOUS ONCE
Refills: 0 | Status: COMPLETED | OUTPATIENT
Start: 2025-03-12 | End: 2025-03-12

## 2025-03-11 RX ORDER — AMLODIPINE BESYLATE 10 MG/1
10 TABLET ORAL DAILY
Refills: 0 | Status: DISCONTINUED | OUTPATIENT
Start: 2025-03-12 | End: 2025-04-25

## 2025-03-11 RX ORDER — PIPERACILLIN-TAZO-DEXTROSE,ISO 2.25G/50ML
3.38 IV SOLUTION, PIGGYBACK PREMIX FROZEN(ML) INTRAVENOUS EVERY 8 HOURS
Refills: 0 | Status: DISCONTINUED | OUTPATIENT
Start: 2025-03-12 | End: 2025-03-12

## 2025-03-11 RX ORDER — ACETAMINOPHEN 500 MG/5ML
1000 LIQUID (ML) ORAL ONCE
Refills: 0 | Status: COMPLETED | OUTPATIENT
Start: 2025-03-11 | End: 2025-03-11

## 2025-03-11 RX ORDER — AMLODIPINE BESYLATE 10 MG/1
5 TABLET ORAL ONCE
Refills: 0 | Status: COMPLETED | OUTPATIENT
Start: 2025-03-11 | End: 2025-03-11

## 2025-03-11 RX ORDER — MAGNESIUM SULFATE 500 MG/ML
1 SYRINGE (ML) INJECTION ONCE
Refills: 0 | Status: COMPLETED | OUTPATIENT
Start: 2025-03-11 | End: 2025-03-11

## 2025-03-11 RX ORDER — SOD PHOS DI, MONO/K PHOS MONO 250 MG
1 TABLET ORAL ONCE
Refills: 0 | Status: COMPLETED | OUTPATIENT
Start: 2025-03-11 | End: 2025-03-11

## 2025-03-11 RX ORDER — IBUPROFEN 200 MG
600 TABLET ORAL ONCE
Refills: 0 | Status: COMPLETED | OUTPATIENT
Start: 2025-03-11 | End: 2025-03-11

## 2025-03-11 RX ADMIN — Medication 400 MILLIGRAM(S): at 16:08

## 2025-03-11 RX ADMIN — Medication 0.1 MILLIGRAM(S): at 06:07

## 2025-03-11 RX ADMIN — Medication 1000 MILLIGRAM(S): at 23:55

## 2025-03-11 RX ADMIN — Medication 0.2 MILLIGRAM(S): at 22:12

## 2025-03-11 RX ADMIN — CLOTRIMAZOLE 1 APPLICATION(S): 1 CREAM TOPICAL at 18:25

## 2025-03-11 RX ADMIN — Medication 4 MILLILITER(S): at 07:58

## 2025-03-11 RX ADMIN — Medication 5 MILLILITER(S): at 22:13

## 2025-03-11 RX ADMIN — POLYETHYLENE GLYCOL 3350 17 GRAM(S): 17 POWDER, FOR SOLUTION ORAL at 13:18

## 2025-03-11 RX ADMIN — CLOTRIMAZOLE 1 APPLICATION(S): 1 CREAM TOPICAL at 06:08

## 2025-03-11 RX ADMIN — Medication 15 MILLILITER(S): at 13:18

## 2025-03-11 RX ADMIN — Medication 600 MILLIGRAM(S): at 22:13

## 2025-03-11 RX ADMIN — IPRATROPIUM BROMIDE AND ALBUTEROL SULFATE 3 MILLILITER(S): .5; 2.5 SOLUTION RESPIRATORY (INHALATION) at 14:34

## 2025-03-11 RX ADMIN — Medication 1 PACKET(S): at 06:07

## 2025-03-11 RX ADMIN — Medication 500000 UNIT(S): at 06:08

## 2025-03-11 RX ADMIN — Medication 1 APPLICATION(S): at 06:09

## 2025-03-11 RX ADMIN — Medication 600 MILLIGRAM(S): at 22:30

## 2025-03-11 RX ADMIN — Medication 0.1 MILLIGRAM(S): at 13:19

## 2025-03-11 RX ADMIN — Medication 40 MILLIEQUIVALENT(S): at 13:19

## 2025-03-11 RX ADMIN — Medication 4 MILLILITER(S): at 03:17

## 2025-03-11 RX ADMIN — QUETIAPINE FUMARATE 25 MILLIGRAM(S): 25 TABLET ORAL at 22:12

## 2025-03-11 RX ADMIN — Medication 1000 MILLIGRAM(S): at 17:00

## 2025-03-11 RX ADMIN — Medication 1 MILLIGRAM(S): at 08:00

## 2025-03-11 RX ADMIN — Medication 25 GRAM(S): at 18:25

## 2025-03-11 RX ADMIN — IPRATROPIUM BROMIDE AND ALBUTEROL SULFATE 3 MILLILITER(S): .5; 2.5 SOLUTION RESPIRATORY (INHALATION) at 20:18

## 2025-03-11 RX ADMIN — Medication 500000 UNIT(S): at 18:26

## 2025-03-11 RX ADMIN — FUROSEMIDE 40 MILLIGRAM(S): 10 INJECTION INTRAMUSCULAR; INTRAVENOUS at 06:03

## 2025-03-11 RX ADMIN — FUROSEMIDE 40 MILLIGRAM(S): 10 INJECTION INTRAMUSCULAR; INTRAVENOUS at 18:28

## 2025-03-11 RX ADMIN — LISINOPRIL 5 MILLIGRAM(S): 5 TABLET ORAL at 13:19

## 2025-03-11 RX ADMIN — Medication 4 MILLILITER(S): at 14:34

## 2025-03-11 RX ADMIN — Medication 4 MILLILITER(S): at 20:19

## 2025-03-11 RX ADMIN — Medication 500000 UNIT(S): at 13:18

## 2025-03-11 RX ADMIN — Medication 200 GRAM(S): at 14:40

## 2025-03-11 RX ADMIN — IPRATROPIUM BROMIDE AND ALBUTEROL SULFATE 3 MILLILITER(S): .5; 2.5 SOLUTION RESPIRATORY (INHALATION) at 03:16

## 2025-03-11 RX ADMIN — Medication 600 MILLIGRAM(S): at 03:04

## 2025-03-11 RX ADMIN — Medication 15 MILLILITER(S): at 06:09

## 2025-03-11 RX ADMIN — AMLODIPINE BESYLATE 5 MILLIGRAM(S): 10 TABLET ORAL at 07:53

## 2025-03-11 RX ADMIN — Medication 1 PACKET(S): at 13:19

## 2025-03-11 RX ADMIN — AMLODIPINE BESYLATE 5 MILLIGRAM(S): 10 TABLET ORAL at 06:03

## 2025-03-11 RX ADMIN — Medication 0.1 MILLIGRAM(S): at 18:28

## 2025-03-11 RX ADMIN — Medication 600 MILLIGRAM(S): at 03:15

## 2025-03-11 RX ADMIN — Medication 400 MILLIGRAM(S): at 23:16

## 2025-03-11 RX ADMIN — Medication 500000 UNIT(S): at 01:57

## 2025-03-11 RX ADMIN — Medication 100 GRAM(S): at 07:58

## 2025-03-11 RX ADMIN — Medication 15 MILLILITER(S): at 18:28

## 2025-03-11 RX ADMIN — IPRATROPIUM BROMIDE AND ALBUTEROL SULFATE 3 MILLILITER(S): .5; 2.5 SOLUTION RESPIRATORY (INHALATION) at 07:58

## 2025-03-11 RX ADMIN — Medication 1 MILLIGRAM(S): at 07:49

## 2025-03-11 NOTE — PROGRESS NOTE ADULT - SUBJECTIVE AND OBJECTIVE BOX
SUBJECTIVE AND OBJECTIVE:  Indication for Geriatrics and Palliative Care Services/INTERVAL HPI:      40 yo M w/ class III obesity, pAfib (no AC), HTN, BEA (on CPAP), history of b/l papilledema attributed to pseudotumor cerebri s/p LP in 2024 with very high opening pressure, who is transferred for VV ECMO for ARDS and severe hypoxia. Patient cannulated for VV ECMO on 2/25 and transferred to [facility] for further management. Palliative care consulted for goals of care and complex medical decision making; patient currently on ECMO.    OVERNIGHT EVENTS:  Pt seen for follow up in MICU;  Patient now s/p trach and PEG; off ECMO (decannulated 3/7);    Overnight into this morning, on trach collar only  Patient awake and alert this morning, on trach collar; introduced GaP team; patient able to mouth and said that he's doing "okay." Denies any pain. Sharing further that he does not remember what had happened when he came in. reviewed with him briefly his course so far and shared that at this time ICU team is further managing/supporting his respiratory status  with hope that he can be downgraded to the floors and then home. Patient confirmed family was able to visit him and he was able to speak to them. Shared that palliative team remains available for his care as needed.    Allergies    No Known Allergies    Intolerances    MEDICATIONS  (STANDING):  albuterol/ipratropium for Nebulization 3 milliLiter(s) Nebulizer every 6 hours  argatroban Infusion 1.7 MICROgram(s)/kG/Min (24.6 mL/Hr) IV Continuous <Continuous>  chlorhexidine 0.12% Liquid 15 milliLiter(s) Oral Mucosa every 12 hours  chlorhexidine 2% Cloths 1 Application(s) Topical <User Schedule>  cloNIDine 0.1 milliGRAM(s) Oral every 8 hours  clotrimazole 1% Cream 1 Application(s) Topical two times a day  dexMEDEtomidine Infusion 0.2 MICROgram(s)/kG/Hr (12 mL/Hr) IV Continuous <Continuous>  dextrose 50% Injectable 25 Gram(s) IV Push once  dextrose 50% Injectable 12.5 Gram(s) IV Push once  dextrose 50% Injectable 25 Gram(s) IV Push once  furosemide    Tablet 40 milliGRAM(s) Oral every 12 hours  glucagon  Injectable 1 milliGRAM(s) IntraMuscular once  insulin lispro (ADMELOG) corrective regimen sliding scale   SubCutaneous every 6 hours  lisinopril 5 milliGRAM(s) Oral every 24 hours  multivitamin/minerals/iron Oral Solution (CENTRUM) 15 milliLiter(s) Oral daily  nystatin    Suspension 107464 Unit(s) Oral every 6 hours  polyethylene glycol 3350 17 Gram(s) Oral daily  QUEtiapine 25 milliGRAM(s) Oral at bedtime  senna Syrup 5 milliLiter(s) Oral at bedtime  sodium chloride 3%  Inhalation 4 milliLiter(s) Inhalation every 6 hours    MEDICATIONS  (PRN):  acetaminophen   IVPB .. 1000 milliGRAM(s) IV Intermittent once PRN Temp greater or equal to 38C (100.4F), Moderate Pain (4 - 6)    MEDICATIONS  (PRN):      ITEMS UNCHECKED ARE NOT PRESENT    PRESENT SYMPTOMS: [X]Unable to self-report - see  CPOT, PAINADS, RDOS below  Source if other than patient:  [ ]Family   [ ]Team     Pain:  [ ]yes [ x]no  QOL impact -   Location -                    Aggravating factors -  Quality -  Radiation -  Timing-  Severity (0-10 scale):  Minimal acceptable level (0-10 scale):     PCSSQ[Palliative Care Spiritual Screening Question]   Severity (0-10):  Score of 4 or > indicate consideration of Chaplaincy referral.  Chaplaincy Referral: [ ] yes [ ] refused [ ] following    Caregiver [place]? : [X] yes [ ] no [ ] deferred:  Social work referral [X] Patient & Family Centered Care Referral [ ]     Anticipatory Grief Present?: [X] yes [ ] no  [ ] deferred: Social work referral [X]  Patient & Family Centered Care Referral [ ]     SYMPTOMS: Unable to obtain due to medical acuity  Dyspnea:                           [ ]Mild [ ]Moderate [ ]Severe  Anxiety:                             [ ]Mild [ ]Moderate [ ]Severe  Fatigue:                             [ ]Mild [ ]Moderate [ ]Severe  Nausea/Vomiting:              [ ]Mild [ ]Moderate [ ]Severe  Loss of appetite:                [ ]Mild [ ]Moderate [ ]Severe  Constipation:                     [ ]Mild [ ]Moderate [ ]Severe    Other Symptoms:  [ ]All other review of systems negative     PHYSICAL EXAM:  Vital Signs Last 24 Hrs  T(C): 38.2 (11 Mar 2025 12:00), Max: 38.6 (10 Mar 2025 13:47)  T(F): 100.8 (11 Mar 2025 12:00), Max: 101.5 (10 Mar 2025 13:47)  HR: 130 (11 Mar 2025 12:00) (116 - 136)  BP: 132/70 (11 Mar 2025 12:00) (132/70 - 145/70)  BP(mean): 87 (11 Mar 2025 12:00) (87 - 90)  RR: 35 (11 Mar 2025 12:00) (15 - 43)  SpO2: 100% (11 Mar 2025 12:00) (96% - 100%)    Parameters below as of 11 Mar 2025 08:00  Patient On (Oxygen Delivery Method): tracheostomy collar  O2 Flow (L/min): 10  O2 Concentration (%): 40    GENERAL: [ ]Cachexia    [ x]Alert  [ ]Oriented x   [ ]Lethargic  [ ]Unarousable  [ ]Verbal  [X]Non-Verbal - unable to phonate but mouthing and writing  Behavioral:   [ ] Anxiety  [ ] Delirium [ ] Agitation [X] Other Sedated  HEENT:  [ ]Normal   [ ]Dry mouth   [ ]ET Tube/Trach  [ ]Oral lesions  PULMONARY:  +trach collar  []Clear [ ]Tachypnea  [ ]Audible excessive secretions   [ ]Rhonchi        [ ]Right [ ]Left [ ]Bilateral  [ ]Crackles        [ ]Right [ ]Left [ ]Bilateral  [ ]Wheezing     [ ]Right [ ]Left [ ]Bilateral  [ ]Diminished breath sounds [ ]right [ ]left [ ]bilateral  CARDIOVASCULAR:    [X]Regular [ ]Irregular [ ]Tachy  [ ]Parviz [ ]Murmur [ ]Other  GASTROINTESTINAL:  [X]Soft  [ ]Distended   [ ]+BS  [ ]Non tender [ ]Tender  [ ]Other [ x]PEG [ ]OGT/ NGT  Last BM: Unknown  GENITOURINARY:  [ ]Normal [ ] Incontinent   [ ]Oliguria/Anuria   [ ]Winslow  MUSCULOSKELETAL:   [ ]Normal   [ x]Weakness  [X]Bed/Wheelchair bound [ ]Edema  NEUROLOGIC:   [ ]No focal deficits  [ ]Cognitive impairment  [ x]Dysphagia [ ]Dysarthria [ ]Paresis [X]Other  SKIN:   [ ]Normal  [ ]Rash  [X]Other - Please see RN documentation which I have reviewed  [ ]Pressure ulcer(s)       Present on admission [ ]y [ ]n    CRITICAL CARE:  [ ]Shock Present  [ ]Septic [ ]Cardiogenic [ ]Neurologic [ ]Hypovolemic  [ ]Vasopressors [ ]Inotropes  [ ]Respiratory failure present [x ]Mechanical Ventilation [ ]Non-invasive ventilatory support [ ]High-Flow Mode: AC/ CMV (Assist Control/ Continuous Mandatory Ventilation), RR (machine): 14, FiO2: 50, PEEP: 18, ITime: 1, MAP: 22, PC: 12, PIP: 31  [ ]Acute  [ ]Chronic [ ]Hypoxic  [ ]Hypercarbic [ ]Other  [X]Other organ failure: Lung           LABS:                          9.4    15.83 )-----------( 152      ( 11 Mar 2025 11:05 )             33.2     03-11    148[H]  |  111[H]  |  18  ----------------------------<  121[H]  3.7   |  29  |  0.60    Ca    8.5      11 Mar 2025 11:05  Phos  2.4     03-11  Mg     2.00     03-11    TPro  6.0  /  Alb  2.9[L]  /  TBili  1.8[H]  /  DBili  x   /  AST  35  /  ALT  53[H]  /  AlkPhos  61  03-11    PT/INR - ( 11 Mar 2025 11:05 )   PT: 22.3 sec;   INR: 1.89 ratio         PTT - ( 11 Mar 2025 11:05 )  PTT:47.3 sec        RADIOLOGY & ADDITIONAL STUDIES:  < from: Xray Chest 1 View- PORTABLE-Urgent (Xray Chest 1 View- PORTABLE-Urgent .) (03.05.25 @ 10:56) >  IMPRESSION:  Lines and tubes in appropriate position as described above.    Similar bilateral haziness consistent with layering effusions although   air space opacities from ARDS is not excluded.    < end of copied text >        Protein Calorie Malnutrition Present: [ ]mild [ ]moderate [ ]severe [ ]underweight [ ]morbid obesity  https://www.andeal.org/vault/1820/web/files/ONC/Table_Clinical%20Characteristics%20to%20Document%20Malnutrition-White%20JV%20et%20al%202012.pdf    Height (cm): 170.2 (02-26-25 @ 01:48), 170.2 (02-25-25 @ 16:00), 177.8 (10-05-24 @ 01:35)  Weight (kg): 240.8 (02-26-25 @ 08:00), 239.2 (02-25-25 @ 20:00), 172.4 (10-05-24 @ 01:35)  BMI (kg/m2): 83.1 (02-26-25 @ 08:00), 82.6 (02-26-25 @ 01:48), 82.6 (02-25-25 @ 20:00)    [ ]PPSV2 < or = 30%  [ ]significant weight loss [ ]poor nutritional intake [ ]anasarca[ ]Artificial Nutrition    Other REFERRALS:  [ ]Hospice  [ ]Child Life  [ ]Social Work  [ ]Case management [ ]Holistic Therapy     Palliative Performance Scale:  http://npcrc.org/files/news/palliative_performance_scale_ppsv2.pdf  (Ctrl +  left click to view)  Respiratory Distress Observation Tool:  https://homecareinformation.net/handouts/hen/Respiratory_Distress_Observation_Scale.pdf (Ctrl +  left click to view)  PAINAD Score:  http://geriatrictoolkit.[place].[facility]/cog/painad.pdf (Ctrl +  left click to view)       SUBJECTIVE AND OBJECTIVE:  Indication for Geriatrics and Palliative Care Services/INTERVAL HPI:      40 yo M w/ class III obesity, pAfib (no AC), HTN, BEA (on CPAP), history of b/l papilledema attributed to pseudotumor cerebri s/p LP in 2024 with very high opening pressure, who is transferred for VV ECMO for ARDS and severe hypoxia. Patient cannulated for VV ECMO on 2/25 and transferred to [facility] for further management. Palliative care consulted for goals of care and complex medical decision making; patient currently on ECMO.    OVERNIGHT EVENTS:  Pt seen for follow up in MICU;  Patient now s/p trach and PEG; off ECMO (decannulated 3/7);    Overnight into this morning, on trach collar only  Patient awake and alert this morning, on trach collar; introduced GaP team; patient able to mouth and said that he's doing "okay." Denies any pain. Sharing further that he does not remember what had happened when he came in. reviewed with him briefly his course so far and shared that at this time ICU team is further managing/supporting his respiratory status  with hope that he can be downgraded to the floors and then home. Patient confirmed family was able to visit him and he was able to speak to them. Shared that palliative team remains available for his care as needed. He confirmed that he would want his parents to make medical decisions on his behalf.     Allergies    No Known Allergies    Intolerances    MEDICATIONS  (STANDING):  albuterol/ipratropium for Nebulization 3 milliLiter(s) Nebulizer every 6 hours  argatroban Infusion 1.7 MICROgram(s)/kG/Min (24.6 mL/Hr) IV Continuous <Continuous>  chlorhexidine 0.12% Liquid 15 milliLiter(s) Oral Mucosa every 12 hours  chlorhexidine 2% Cloths 1 Application(s) Topical <User Schedule>  cloNIDine 0.1 milliGRAM(s) Oral every 8 hours  clotrimazole 1% Cream 1 Application(s) Topical two times a day  dexMEDEtomidine Infusion 0.2 MICROgram(s)/kG/Hr (12 mL/Hr) IV Continuous <Continuous>  dextrose 50% Injectable 25 Gram(s) IV Push once  dextrose 50% Injectable 12.5 Gram(s) IV Push once  dextrose 50% Injectable 25 Gram(s) IV Push once  furosemide    Tablet 40 milliGRAM(s) Oral every 12 hours  glucagon  Injectable 1 milliGRAM(s) IntraMuscular once  insulin lispro (ADMELOG) corrective regimen sliding scale   SubCutaneous every 6 hours  lisinopril 5 milliGRAM(s) Oral every 24 hours  multivitamin/minerals/iron Oral Solution (CENTRUM) 15 milliLiter(s) Oral daily  nystatin    Suspension 650973 Unit(s) Oral every 6 hours  polyethylene glycol 3350 17 Gram(s) Oral daily  QUEtiapine 25 milliGRAM(s) Oral at bedtime  senna Syrup 5 milliLiter(s) Oral at bedtime  sodium chloride 3%  Inhalation 4 milliLiter(s) Inhalation every 6 hours    MEDICATIONS  (PRN):  acetaminophen   IVPB .. 1000 milliGRAM(s) IV Intermittent once PRN Temp greater or equal to 38C (100.4F), Moderate Pain (4 - 6)    MEDICATIONS  (PRN):      ITEMS UNCHECKED ARE NOT PRESENT    PRESENT SYMPTOMS: [X]Unable to self-report - see  CPOT, PAINADS, RDOS below  Source if other than patient:  [ ]Family   [ ]Team     Pain:  [ ]yes [ x]no  QOL impact -   Location -                    Aggravating factors -  Quality -  Radiation -  Timing-  Severity (0-10 scale):  Minimal acceptable level (0-10 scale):     PCSSQ[Palliative Care Spiritual Screening Question]   Severity (0-10):  Score of 4 or > indicate consideration of Chaplaincy referral.  Chaplaincy Referral: [ ] yes [ ] refused [ ] following    Caregiver [place]? : [X] yes [ ] no [ ] deferred:  Social work referral [X] Patient & Family Centered Care Referral [ ]     Anticipatory Grief Present?: [X] yes [ ] no  [ ] deferred: Social work referral [X]  Patient & Family Centered Care Referral [ ]     SYMPTOMS: Unable to obtain due to medical acuity  Dyspnea:                           [ ]Mild [ ]Moderate [ ]Severe  Anxiety:                             [ ]Mild [ ]Moderate [ ]Severe  Fatigue:                             [ ]Mild [ ]Moderate [ ]Severe  Nausea/Vomiting:              [ ]Mild [ ]Moderate [ ]Severe  Loss of appetite:                [ ]Mild [ ]Moderate [ ]Severe  Constipation:                     [ ]Mild [ ]Moderate [ ]Severe    Other Symptoms:  [ ]All other review of systems negative     PHYSICAL EXAM:  Vital Signs Last 24 Hrs  T(C): 38.2 (11 Mar 2025 12:00), Max: 38.6 (10 Mar 2025 13:47)  T(F): 100.8 (11 Mar 2025 12:00), Max: 101.5 (10 Mar 2025 13:47)  HR: 130 (11 Mar 2025 12:00) (116 - 136)  BP: 132/70 (11 Mar 2025 12:00) (132/70 - 145/70)  BP(mean): 87 (11 Mar 2025 12:00) (87 - 90)  RR: 35 (11 Mar 2025 12:00) (15 - 43)  SpO2: 100% (11 Mar 2025 12:00) (96% - 100%)    Parameters below as of 11 Mar 2025 08:00  Patient On (Oxygen Delivery Method): tracheostomy collar  O2 Flow (L/min): 10  O2 Concentration (%): 40    GENERAL: [ ]Cachexia    [ x]Alert  [ ]Oriented x   [ ]Lethargic  [ ]Unarousable  [ ]Verbal  [X]Non-Verbal - unable to phonate but mouthing and writing  Behavioral:   [ ] Anxiety  [ ] Delirium [ ] Agitation [X] Other Sedated  HEENT:  [ ]Normal   [ ]Dry mouth   [ ]ET Tube/Trach  [ ]Oral lesions  PULMONARY:  +trach collar  []Clear [ ]Tachypnea  [ ]Audible excessive secretions   [ ]Rhonchi        [ ]Right [ ]Left [ ]Bilateral  [ ]Crackles        [ ]Right [ ]Left [ ]Bilateral  [ ]Wheezing     [ ]Right [ ]Left [ ]Bilateral  [ ]Diminished breath sounds [ ]right [ ]left [ ]bilateral  CARDIOVASCULAR:    [X]Regular [ ]Irregular [ ]Tachy  [ ]Parviz [ ]Murmur [ ]Other  GASTROINTESTINAL:  [X]Soft  [ ]Distended   [ ]+BS  [ ]Non tender [ ]Tender  [ ]Other [ x]PEG [ ]OGT/ NGT  Last BM: Unknown  GENITOURINARY:  [ ]Normal [ ] Incontinent   [ ]Oliguria/Anuria   [ ]Winslow  MUSCULOSKELETAL:   [ ]Normal   [ x]Weakness  [X]Bed/Wheelchair bound [ ]Edema  NEUROLOGIC:   [ ]No focal deficits  [ ]Cognitive impairment  [ x]Dysphagia [ ]Dysarthria [ ]Paresis [X]Other  SKIN:   [ ]Normal  [ ]Rash  [X]Other - Please see RN documentation which I have reviewed  [ ]Pressure ulcer(s)       Present on admission [ ]y [ ]n    CRITICAL CARE:  [ ]Shock Present  [ ]Septic [ ]Cardiogenic [ ]Neurologic [ ]Hypovolemic  [ ]Vasopressors [ ]Inotropes  [ ]Respiratory failure present [x ]Mechanical Ventilation [ ]Non-invasive ventilatory support [ ]High-Flow Mode: AC/ CMV (Assist Control/ Continuous Mandatory Ventilation), RR (machine): 14, FiO2: 50, PEEP: 18, ITime: 1, MAP: 22, PC: 12, PIP: 31  [ ]Acute  [ ]Chronic [ ]Hypoxic  [ ]Hypercarbic [ ]Other  [X]Other organ failure: Lung           LABS:                          9.4    15.83 )-----------( 152      ( 11 Mar 2025 11:05 )             33.2     03-11    148[H]  |  111[H]  |  18  ----------------------------<  121[H]  3.7   |  29  |  0.60    Ca    8.5      11 Mar 2025 11:05  Phos  2.4     03-11  Mg     2.00     03-11    TPro  6.0  /  Alb  2.9[L]  /  TBili  1.8[H]  /  DBili  x   /  AST  35  /  ALT  53[H]  /  AlkPhos  61  03-11    PT/INR - ( 11 Mar 2025 11:05 )   PT: 22.3 sec;   INR: 1.89 ratio         PTT - ( 11 Mar 2025 11:05 )  PTT:47.3 sec        RADIOLOGY & ADDITIONAL STUDIES:  < from: Xray Chest 1 View- PORTABLE-Urgent (Xray Chest 1 View- PORTABLE-Urgent .) (03.05.25 @ 10:56) >  IMPRESSION:  Lines and tubes in appropriate position as described above.    Similar bilateral haziness consistent with layering effusions although   air space opacities from ARDS is not excluded.    < end of copied text >        Protein Calorie Malnutrition Present: [ ]mild [ ]moderate [ ]severe [ ]underweight [ ]morbid obesity  https://www.andeal.org/vault/7000/web/files/ONC/Table_Clinical%20Characteristics%20to%20Document%20Malnutrition-White%20JV%20et%20al%202012.pdf    Height (cm): 170.2 (02-26-25 @ 01:48), 170.2 (02-25-25 @ 16:00), 177.8 (10-05-24 @ 01:35)  Weight (kg): 240.8 (02-26-25 @ 08:00), 239.2 (02-25-25 @ 20:00), 172.4 (10-05-24 @ 01:35)  BMI (kg/m2): 83.1 (02-26-25 @ 08:00), 82.6 (02-26-25 @ 01:48), 82.6 (02-25-25 @ 20:00)    [ ]PPSV2 < or = 30%  [ ]significant weight loss [ ]poor nutritional intake [ ]anasarca[ ]Artificial Nutrition    Other REFERRALS:  [ ]Hospice  [ ]Child Life  [ ]Social Work  [ ]Case management [ ]Holistic Therapy     Palliative Performance Scale:  http://npcrc.org/files/news/palliative_performance_scale_ppsv2.pdf  (Ctrl +  left click to view)  Respiratory Distress Observation Tool:  https://homecareinformation.net/handouts/hen/Respiratory_Distress_Observation_Scale.pdf (Ctrl +  left click to view)  PAINAD Score:  http://geriatrictoolkit.[place].[facility]/cog/painad.pdf (Ctrl +  left click to view)

## 2025-03-11 NOTE — CHART NOTE - NSCHARTNOTEFT_GEN_A_CORE
MICU Transfer Note    Transfer from: MICU  Transfer to:  (  ) Medicine    (  ) Telemetry    (  ) RCU    (  ) Palliative    (  ) Stroke Unit    (  ) _______________  Accepting physican:      MICU COURSE:  40 yo M w/ class III obesity, pAfib (no AC), HTN, BEA (on CPAP), history of b/l papilledema attributed to pseudotumor cerebri, who is transferred from [place] on 2/26 for VV ECMO 2/2 ARDS. Patient initially presented to [place] on 2/24 for SOB and b/l LE edema. As per chart review, patient endorses some degree of SOB and b/l LE edema x 3 months with significant weight gain. As per ICU team, patient had positive sick contacts with viral URI 1 to 2 weeks PTA and since then has had worsening SOB. CT imaging showed patchy bilateral lower lobe alveolar opacities likely representing multifocal pneumonia. Patient found to be reportedly hypoxemic to 70% on RA with worsening respiratory status/secretions and somnolence in the ED. Patient was a difficult intubation (~6 attempts). Despite optimal vent management, patient remained hypoxemic. Unable to prone due to obesity. Patient cannulated for VV ECMO on 2/25 and transferred to [facility] for further management. ROBERT showed enlarged RV, aggressively diuresed. Completed course of Zosyn for possible PNA and cellulitis (abdominal pannus) extended for malodorous bronch secretions, however cultures remain ngtd. Restarted Zosyn iso leukocytosis and fevers. Patient now decannulated and s/p trach/PEG placed 3/7. Currently improving, alert and oriented weaned off sedation.                 For Follow-Up:  [ ] Monitor QTc while on Seroquel  [ ] Adjust clonidine dose as needed, started 3/11 to assist weaning off precedex  [ ] Continue aggressive PT/OT  [ ] Continue  amlodipine 10mg daily and lisinopril 5mg daily and increase as needed for HTN  [ ] Follow up repeat TTE  [ ] Continue duoNeb q6 hours + 3% saline   [ ] Tracheostomy #6 portex proximal XLT, placed 3/7 by CT surgery in OR, CTsx to remove trach sutures   [ ] CTsx to remove ECMO sutures ~10 days post decannulation (3/17?)  [ ] Continue bowel regimen with Senna and Miralax flexiseal in place   [ ] C/w free h20 300ml q4 hr for hypernatremia, adjust as needed  [ ] C/w  standing lasix 40mg PO BID   [ ] Monitor urine and electrolytes closely    [ ] Follow up UA, sputum, and blood cultures sent 3/11  [ ] C/w Empiric Zosyn, restarted 3/11 iso fevers and leukocytosis  [ ] Monitor buttocks DTI and b/l plantar blisters  [ ] C/w argatroban with goal 50-70 pending official [facility] duplex post decannulation (3/17) MICU Transfer Note    Transfer from: MICU  Transfer to:  (  ) Medicine    (  ) Telemetry    (X) RCU    (  ) Palliative    (  ) Stroke Unit    (  ) _______________  Accepting physician:  MD Kim, Dae Hyeon      MICU COURSE:  38 yo M w/ class III obesity, pAfib (no AC), HTN, BAE (on CPAP), history of b/l papilledema attributed to pseudotumor cerebri, who is transferred from [place] on 2/26 for VV ECMO 2/2 ARDS. Patient initially presented to [place] on 2/24 for SOB and b/l LE edema. As per chart review, patient endorses some degree of SOB and b/l LE edema x 3 months with significant weight gain. As per ICU team, patient had positive sick contacts with viral URI 1 to 2 weeks PTA and since then has had worsening SOB. CT imaging showed patchy bilateral lower lobe alveolar opacities likely representing multifocal pneumonia. Patient found to be reportedly hypoxemic to 70% on RA with worsening respiratory status/secretions and somnolence in the ED. Patient was a difficult intubation (~6 attempts). Despite optimal vent management, patient remained hypoxemic. Unable to prone due to obesity. Patient cannulated for VV ECMO on 2/25 and transferred to [facility] for further management. ROBERT showed enlarged RV, aggressively diuresed. Completed course of Zosyn for possible PNA and cellulitis (abdominal pannus) extended for malodorous bronch secretions, however cultures remain ngtd. Restarted Zosyn iso leukocytosis and fevers. Patient now decannulated and s/p trach/PEG placed 3/7. Currently improving, alert and oriented weaned off sedation. Tolerating Trach collar throughout the day and night. Now stable for transfer to RCU.                For Follow-Up:  [ ] Monitor QTc while on Seroquel  [ ] Adjust clonidine dose as needed, started 3/11 to assist weaning off precedex  [ ] Continue aggressive PT/OT  [ ] Continue  amlodipine 10mg daily and lisinopril 5mg daily and increase as needed for HTN  [ ] Follow up repeat TTE  [ ] Continue duoNeb q6 hours + 3% saline   [ ] Tracheostomy #6 portex proximal XLT, placed 3/7 by CT surgery in OR, CTsx to remove trach sutures   [ ] CTsx to remove ECMO sutures ~10 days post decannulation (3/17?)  [ ] Continue bowel regimen with Senna and Miralax flexiseal in place   [ ] C/w free h20 300ml q4 hr for hypernatremia, adjust as needed  [ ] C/w  standing lasix 40mg PO BID   [ ] Monitor urine and electrolytes closely    [ ] Follow up UA, sputum, and blood cultures sent 3/11  [ ] C/w Empiric Zosyn, restarted 3/11 iso fevers and leukocytosis  [ ] Monitor buttocks DTI and b/l plantar blisters  [ ] C/w argatroban with goal 50-70 pending official [facility] duplex post decannulation (3/17)

## 2025-03-11 NOTE — PROGRESS NOTE ADULT - PROBLEM SELECTOR PLAN 3
Palliative care consulted for goals of care and complex medical decision making.    In the event of newly developing, evolving, or worsening symptoms, please contact the Palliative Medicine team via pager (if the patient is at [facility] #[phone] or if the patient is at [facility] #[phone]) The Geriatric and Palliative Medicine service has coverage 24 hours a day/ 7 days a week to provide medical recommendations regarding symptom management needs via telephone. Palliative care consulted for goals of care and complex medical decision making.  Will sign off at this time, reconsult as needed.    In the event of newly developing, evolving, or worsening symptoms, please contact the Palliative Medicine team via pager (if the patient is at [facility] #[phone] or if the patient is at [facility] #[phone]) The Geriatric and Palliative Medicine service has coverage 24 hours a day/ 7 days a week to provide medical recommendations regarding symptom management needs via telephone.

## 2025-03-11 NOTE — PROGRESS NOTE ADULT - PROBLEM SELECTOR PROBLEM 1
Acute respiratory distress syndrome (ARDS)

## 2025-03-11 NOTE — PROGRESS NOTE ADULT - PROBLEM SELECTOR PLAN 1
Presented for hypoxia, reported to hypoxic despite ventilator support   Started on VV ECMO 2/25  Patient intubated and sedated, unable to participate in conversation; weaning sedation, off paralytics,   Concern for PNA, was on Vanc/Zosyn for ARDS, now on Zosyn until 3/8  C/b hypertension and afib, s/p espolol gtt; also fluid overload requiring diuresis  Care per MICU Team Presented for hypoxia, reported to hypoxic despite ventilator support   Started on VV ECMO 2/25; Patient was intubated   Patient now s/p trach and PEG; on trach collar, off ECMO (decannulated 3/7);    Mental status improving; on seroquel off sedation, able to communicate via writing and mouthing  Concern for PNA, was on Vanc/Zosyn for ARDS, Completed Zosyn until 3/8;  Resumed Empiric Zosyn iso fevers and leukocytosis  C/b hypertension and afib, s/p espolol gtt; undergoing IV diuresis (  Overall improving, plan for possible downgrade from micu  Care per MICU Team

## 2025-03-11 NOTE — PROGRESS NOTE ADULT - ASSESSMENT
38 yo M w/ class III obesity, pAfib (no AC), HTN, BEA (on CPAP), history of b/l papilledema attributed to pseudotumor cerebri, who is transferred from [place] on 2/26 for VV ECMO 2/2 ARDS. Patient initially presented to [place] on 2/24 for SOB and b/l LE edema. As per chart review, patient endorses some degree of SOB and b/l LE edema x 3 months with significant weight gain. As per ICU team, patient had positive sick contacts with viral URI 1 to 2 weeks PTA and since then has had worsening SOB. Patient found to be reportedly hypoxemic to 70% on RA with worsening respiratory status/secretions and somnolence in the ED. Patient was a difficult intubation (~6 attempts). Despite optimal vent management, patient remained hypoxemic. Unable to prone due to obesity. Patient cannulated for VV ECMO on 2/25 and transferred to [facility] for further management. Patient now decannulated and s/p trach/PEG placed 3/7. Currently improving, alert and oriented weaning sedation.    Neuro  #Mental status   #pseudotumor cerebri  - history of pseudotumor cerebri s/p LP under fluoro in 2024 with high opening pressure with MRI 2024 also showed possible pituitary mass but unclear because of pressure effect from pseudotumor cerebri causing partially empty sella. Considering to start acetazolamide outpatient but never started  - prolactin high (30), defer checking cortisol axis given already received steroids but should be investigated, ACTH (<1.5) low but should be suppressed iso steroids  - concern for pituitary adenoma, however TSH .79 normal,  fT4 0.9 normal, and low T3 67  - weaned off Dilaudid and now on low dose Precedex with plan to wean as tolerated  - started on clonidine 0.1mg q8 hrs to assist with precedex wean iso HTN    - patient now awake, calm, communicative, EASLEY, following commands   - currently on Seroquel 25mg QHS    - PT/OT following OOB via hailey lift today     Cardiovascular  #HTN   #Atrial fibrillation   - Hx of a. fib not on AC has not had any episodes during admission   - echo from 2024 did not demonstrate elevation of pulmonary pressures, but now with severe PH suggesting chronicity on TTE at    - TTE on 2/25 showed LV EF 61%, enlarged RV with TAPSE 2.1cm, ePASP at least 49 (Patient had 10/2024 TTE with normal LV and RV with ePASP 14).  - ROBERT 3/1 showing mild TR, enlarged RV with normal LV/RV function. PEEP increased to 24 without signs of RV dilation  - troponins initially elevated, peaked at 162 however downtrended likely from RV dilation and strain - EKG neg for any typical ischemia  - s/p esmolol drip for high flows now D/C'd may need to consider again if need for higher flows  - c/w amlodipine 5mg daily and lisinopril 5mg daily and increase as needed   - ECG daily to monitor QTc  as has been on higher end but will watch closely on Seroquel  - pending repeat TTE ordered 3/10    Pulmonary  #Pulmonary HTN  #ARDS  #Multifocal Pneumonia   #BEA   - history of diagnosed BEA/?OHS intermittent compliance to home PAP  - c/f acute on suspected chronic pulmonary hypertension in setting of possible BEA/OHS c/b pneumonia, ARDS, and pulmonary edema requiring difficult intubation on 2/25 and VVecmo 2/25-3/7. Now s/p tracheostomy 3/7 with weaning trials.   - CTA chest on 2/24 negative for pulmonary embolism, notable for patchy bilateral lower lobe opacities, hepatomegaly, mild ascites, edema/induration of the abdominal pannus.  - Full RVP and BAL cultures negative    - serial bronchoscopies for airway clearance and evaluation, last bronch 3/5 with evidence of bleeding in the LLL and the distal trachea/RMSB lateral aspect of the bifurcation. BAL ngtd  - s/p Dexamethasone followed DEXA/ARDS protocol (20mg x5 days then 10mg x5 days)  - C/w duoNeb q6 hours + 3% saline   - tolerating trach collar 40%  - difficult glottic visualization due to large tongue upon intubation at [facility] 2/25 but able to visualize here easily with S4 video laryngoscope  - S/p Flex bronchoscopy tracheostomy #6 portex proximal XLT, placed 3/7 by CT surgery in OR, CTsx to remove trach sutures   - CTsx to remove ECMO sutures ~10 days post decannulation       Gastrointestinal  #Diet   #Transaminitis - resolving  #constipation-resolved   - s/p Endoscopic percutaneous gastrostomy LUQ PEG 3/7   - tolerated tube feeds via PEG    - elevated Tbili likely iso hemolysis 2/2 high ECMO flows, mild transaminitis now improving   - hepatomegaly and mild ascites noted on CT A/P - no pocket to tap  - RUQ ultrasound with steatosis    - c/w bowel regimen with Senna and Miralax flexiseal in place   - nutrition following       Gu/Renal  #ELLA  #Hypernatremia   #Non-AG Metabolic Alkalosis  - ELLA - improved, likely ATN/vascular congestion  - good UO, overall net negative  - c/w free h20 250ml q6 hr for hypernatremia    - initially held lasix due to contraction alkalosis but now restarted standing lasix 40mg BID   - monitor urine and electrolytes closely        Infectious disease  #leukocytosis  #Pneumonia   #cellulitis   #febrile   - intermittent mild Leukocytosis, febrile post decannulation but c/o increased sputum, follow up sent sputum culture   - s/p cefepime, linezolid, and aztihro 2/25-2/26   - s/p vanco (2/26-3/1) and zosyn (2/26-3/9) to cover cellulitis/pneumonia  - full RVP negative  - BCx x 2 2/24 and 2/26 currently NGTD, UCx 2/25 NGTD, tracheal aspirate Cx from 2/25 with normal commensal kyle   - strep pneumo Ag and urine legionella negative   - s/p Mupiricon b/l nares for +MSSA  (2/26-3/3)  - BAL cultures all with NGTD  - ?penile meatus yellow discharge resolved, GC/chamydia and HIV negative   - candidal intertrigo +/- cellulitis of the pannus - clotrimazole cream 1% BID 2/26 -+ abx as per above    Hematology/DVT ppx  #Anticoagulation   #Thrombocytopenia  #Anemia   - Hb stable, thrombocytopenia likely r/t shearing 2/2 ECMO circuit now improved off circuit   - 4T score low probability of HIT, received heparin at OSH, PF4 neg, SHAWANDA negative   - c/w argatroban with goal 50-70 pending official [facility] duplex post decannulation   - LE duplex 2/25 neg  - s/p 1 u PRBC 2/26 for O2 delivery  - VA duplex to upper and lower ext negative for DVT 3/9 will continue argatroban until repeat VA duplex in 7 days 3/17    Endocrine  #DM  -A1c 6.7   - obesity with metabolic syndrome  - diabetes with steroid induced hyperglycemia, mild - ISS  - s/p DEXA/ARDS dexamethasone protocol   - pituitary workup largely unremarkable, steroids as above given recent weight gain but likely large component of volume though  - c/w FS Q6h and moderate ISS and adjust if needed now off steroids     SKin/Lines  #Access  #Cellulitis   #DTI with skin sloughing  #Blisters  - L subclavian TLC inserted 2/25-3/8  - L radial artery line 2/25  - Right IJ and Right Fem ECMO cannulas 2/25-3/7  - candidal intertrigo +/- cellulitis of the pannus - clotrimazole cream 1% BID 2/26 -+ abx as per above  - DTI to right buttocks (sloughing of skin) and B/L feet Blisters    - podiatry following consulted for  b/l foot blisters- recs vascular consult and on 3/4 lanced bilateral foot serous blister with mild serous drainage now with wound care and f/u out patient   - Vascular consulted b/l blisters likely pressure related, recommending z flow boots for offloading  - No arterial disease and b/l 3 vessel runoff on VA duplex performed 3/5. No indication for further invasive or non-invasive studies    GOC   - full code   - palliative involved was following while on ECMO   - Mother, father involved in care are next of kin/surrogates and patient's girlfriend/partner is also involved but defers to parents     38 yo M w/ class III obesity, pAfib (no AC), HTN, BEA (on CPAP), history of b/l papilledema attributed to pseudotumor cerebri, who is transferred from [place] on 2/26 for VV ECMO 2/2 ARDS. Patient initially presented to [place] on 2/24 for SOB and b/l LE edema. As per chart review, patient endorses some degree of SOB and b/l LE edema x 3 months with significant weight gain. As per ICU team, patient had positive sick contacts with viral URI 1 to 2 weeks PTA and since then has had worsening SOB. Patient found to be reportedly hypoxemic to 70% on RA with worsening respiratory status/secretions and somnolence in the ED. Patient was a difficult intubation (~6 attempts). Despite optimal vent management, patient remained hypoxemic. Unable to prone due to obesity. Patient cannulated for VV ECMO on 2/25 and transferred to [facility] for further management. Patient now decannulated and s/p trach/PEG placed 3/7. Currently improving, alert and oriented weaning sedation.    Neuro  #Mental status   #pseudotumor cerebri  - history of pseudotumor cerebri s/p LP under fluoro in 2024 with high opening pressure with MRI 2024 also showed possible pituitary mass but unclear because of pressure effect from pseudotumor cerebri causing partially empty sella. Considering to start acetazolamide outpatient but never started  - prolactin high (30), defer checking cortisol axis given already received steroids but should be investigated, ACTH (<1.5) low but should be suppressed iso steroids  - concern for pituitary adenoma, however TSH .79 normal,  fT4 0.9 normal, and low T3 67  - weaned off Dilaudid and now on low dose Precedex with plan to wean as tolerated  - started on clonidine 0.1mg q8 hrs to assist with precedex wean, will increase dose to 0.2mg q8hrs iso HTN and tachycardia  - patient now awake, calm, communicative, EASLEY, following commands   - currently on Seroquel 25mg QHS    - PT/OT following OOB via hailey lifted to chair, able to perform exercises sitting up edge of bed     Cardiovascular  #HTN   #Atrial fibrillation   - Hx of a. fib not on AC has not had any episodes during admission   - echo from 2024 did not demonstrate elevation of pulmonary pressures, but now with severe PH suggesting chronicity on TTE at    - TTE on 2/25 showed LV EF 61%, enlarged RV with TAPSE 2.1cm, ePASP at least 49 (Patient had 10/2024 TTE with normal LV and RV with ePASP 14).  - ROBERT 3/1 showing mild TR, enlarged RV with normal LV/RV function. PEEP increased to 24 without signs of RV dilation  - troponins initially elevated, peaked at 162 however downtrended likely from RV dilation and strain - EKG neg for any typical ischemia  - s/p esmolol drip for high flows now D/C'd may need to consider again if need for higher flows  - increase amlodipine to 10mg daily iso HTN and continue lisinopril 5mg daily, adjust as needed   - ECG daily to monitor QTc  as has been on higher end but will watch closely on Seroquel  - pending repeat TTE ordered 3/10    Pulmonary  #Pulmonary HTN  #ARDS  #Multifocal Pneumonia   #BEA   - history of diagnosed BEA/?OHS intermittent compliance to home PAP  - c/f acute on suspected chronic pulmonary hypertension in setting of possible BEA/OHS c/b pneumonia, ARDS, and pulmonary edema requiring difficult intubation on 2/25 and VVecmo 2/25-3/7. Now s/p tracheostomy 3/7 with weaning trials.   - CTA chest on 2/24 negative for pulmonary embolism, notable for patchy bilateral lower lobe opacities, hepatomegaly, mild ascites, edema/induration of the abdominal pannus.  - Full RVP and BAL cultures negative    - serial bronchoscopies for airway clearance and evaluation, last bronch 3/5 with evidence of bleeding in the LLL and the distal trachea/RMSB lateral aspect of the bifurcation. BAL ngtd  - s/p Dexamethasone followed DEXA/ARDS protocol (20mg x5 days then 10mg x5 days)  - C/w duoNeb q6 hours + 3% saline   - tolerating trach collar 40%, can try passy-david speaking valve (ordered)   - difficult glottic visualization due to large tongue upon intubation at [facility] 2/25 but able to visualize here easily with S4 video laryngoscope  - S/p Flex bronchoscopy tracheostomy #6 portex proximal XLT, placed 3/7 by CT surgery in OR, CTsx to remove trach sutures -> 3/17?  - CTsx to remove ECMO sutures ~10 days post decannulation ->  3/17      Gastrointestinal  #Diet   #Transaminitis - resolving  #constipation-resolved   - s/p Endoscopic percutaneous gastrostomy LUQ PEG 3/7   - tolerated tube feeds via PEG    - elevated Tbili likely iso hemolysis 2/2 high ECMO flows, mild transaminitis now improving   - hepatomegaly and mild ascites noted on CT A/P - no pocket to tap  - RUQ ultrasound with steatosis    - c/w bowel regimen with Senna and Miralax flexiseal in place   - nutrition following       Gu/Renal  #ELLA  #Hypernatremia   #Non-AG Metabolic Alkalosis  - ELLA - improved, likely ATN/vascular congestion  - good UO, overall net negative  - baires removed 3/10, passed TOV  - c/w free h20 300ml q4 hr for hypernatremia    - initially held lasix due to contraction alkalosis but now restarted standing lasix 40mg BID   - monitor urine and electrolytes closely        Infectious disease  #leukocytosis  #Pneumonia   #cellulitis   #febrile   - Leukocytosis, remains febrile post decannulation but c/o increased sputum  - Repeat and follow up UA, sputum and blood cultures   - Recently completed course of zosyn (2/26-3/9) to cover cellulitis/pneumonia, will resume empiric zosyn (3/11-  ) iso fevers  - s/p cefepime, linezolid,  aztihro 2/25-2/26 and  vanco (2/26-3/1)   - BCx x 2 2/24 and 2/26 currently NGTD, UCx 2/25 NGTD, tracheal aspirate Cx from 2/25 with normal commensal kyle   - full RVP negativestrep pneumo Ag and urine legionella negative   - s/p Mupiricon b/l nares for +MSSA  (2/26-3/3)  - BAL cultures all with NGTD  - ?penile meatus yellow discharge resolved, GC/chamydia and HIV negative   - candidal intertrigo +/- cellulitis of the pannus - clotrimazole cream 1% BID 2/26 -+ abx as per above    Hematology/DVT ppx  #Anticoagulation   #Thrombocytopenia  #Anemia   - Hb stable, thrombocytopenia likely r/t shearing 2/2 ECMO circuit now improved off circuit   - 4T score low probability of HIT, received heparin at OSH, PF4 neg, SHAWANDA negative   - c/w argatroban with goal 50-70 pending official VA duplex post decannulation   - LE duplex 2/25 neg  - s/p 1 u PRBC 2/26 for O2 delivery  - VA duplex to upper and lower ext negative for DVT 3/9 will continue argatroban until repeat VA duplex in 7 days 3/17    Endocrine  #DM  -A1c 6.7   - obesity with metabolic syndrome  - diabetes with steroid induced hyperglycemia, mild - ISS  - s/p DEXA/ARDS dexamethasone protocol   - pituitary workup largely unremarkable, steroids as above given recent weight gain but likely large component of volume though  - c/w FS Q6h and low ISS and adjust if needed now off steroids     SKin/Lines  #Access  #Cellulitis   #DTI with skin sloughing  #Blisters  - L subclavian TLC inserted 2/25-3/8  - L radial artery line 2/25-3/11  - Right IJ and Right Fem ECMO cannulas 2/25-3/7  - candidal intertrigo +/- cellulitis of the pannus - clotrimazole cream 1% BID 2/26 -+ abx as per above  - DTI to right buttocks (sloughing of skin) and B/L feet Blisters    - podiatry following consulted for  b/l foot blisters- recs vascular consult and on 3/4 lanced bilateral foot serous blister with mild serous drainage now with wound care and f/u out patient   - Vascular consulted b/l blisters likely pressure related, recommending z flow boots for offloading  - No arterial disease and b/l 3 vessel runoff on VA duplex performed 3/5. No indication for further invasive or non-invasive studies    GOC   - full code   - palliative involved was following while on ECMO   - Mother, father involved in care are next of kin/surrogates and patient's girlfriend/partner is also involved but defers to parents     40 yo M w/ class III obesity, pAfib (no AC), HTN, BEA (on CPAP), history of b/l papilledema attributed to pseudotumor cerebri, who is transferred from [place] on 2/26 for VV ECMO 2/2 ARDS. Patient initially presented to [place] on 2/24 for SOB and b/l LE edema. As per chart review, patient endorses some degree of SOB and b/l LE edema x 3 months with significant weight gain. As per ICU team, patient had positive sick contacts with viral URI 1 to 2 weeks PTA and since then has had worsening SOB. Patient found to be reportedly hypoxemic to 70% on RA with worsening respiratory status/secretions and somnolence in the ED. Patient was a difficult intubation (~6 attempts). Despite optimal vent management, patient remained hypoxemic. Unable to prone due to obesity. Patient cannulated for VV ECMO on 2/25 and transferred to [facility] for further management. Patient now decannulated and s/p trach/PEG placed 3/7. Currently improving, alert and oriented weaning sedation.    Neuro  #Mental status   #pseudotumor cerebri  - history of pseudotumor cerebri s/p LP under fluoro in 2024 with high opening pressure with MRI 2024 also showed possible pituitary mass but unclear because of pressure effect from pseudotumor cerebri causing partially empty sella. Considering to start acetazolamide outpatient but never started  - prolactin high (30), defer checking cortisol axis given already received steroids but should be investigated, ACTH (<1.5) low but should be suppressed iso steroids  - concern for pituitary adenoma, however TSH .79 normal,  fT4 0.9 normal, and low T3 67  - weaned off Dilaudid and now on low dose Precedex with plan to wean as tolerated  - started on clonidine 0.1mg q8 hrs to assist with precedex wean, will increase dose to 0.2mg q8hrs iso HTN and tachycardia  - patient now awake, calm, communicative, EASLEY, following commands   - currently on Seroquel 25mg QHS    - PT/OT following OOB via hailey lifted to chair, able to perform exercises sitting up edge of bed     Cardiovascular  #HTN   #Atrial fibrillation   - Hx of a. fib not on AC has not had any episodes during admission   - echo from 2024 did not demonstrate elevation of pulmonary pressures, but now with severe PH suggesting chronicity on TTE at    - TTE on 2/25 showed LV EF 61%, enlarged RV with TAPSE 2.1cm, ePASP at least 49 (Patient had 10/2024 TTE with normal LV and RV with ePASP 14).  - ROBERT 3/1 showing mild TR, enlarged RV with normal LV/RV function. PEEP increased to 24 without signs of RV dilation  - troponins initially elevated, peaked at 162 however downtrended likely from RV dilation and strain - EKG neg for any typical ischemia  - s/p esmolol drip for high flows now D/C'd may need to consider again if need for higher flows  - increase amlodipine to 10mg daily iso HTN and continue lisinopril 5mg daily, adjust as needed   - ECG daily to monitor QTc  as has been on higher end but will watch closely on Seroquel  - repeat TTE 3/11  Normal LV, EF 66 %, RV not well visualized. enlarged right ventricular cavity size, reduced right ventricular systolic function.  (TAPSE) is 1.6 cm. No evidence of pulmonary hypertension.       Pulmonary  #Pulmonary HTN  #ARDS  #Multifocal Pneumonia   #BEA   - history of diagnosed BEA/?OHS intermittent compliance to home PAP  - c/f acute on suspected chronic pulmonary hypertension in setting of possible BEA/OHS c/b pneumonia, ARDS, and pulmonary edema requiring difficult intubation on 2/25 and VVecmo 2/25-3/7. Now s/p tracheostomy 3/7 with weaning trials.   - CTA chest on 2/24 negative for pulmonary embolism, notable for patchy bilateral lower lobe opacities, hepatomegaly, mild ascites, edema/induration of the abdominal pannus.  - Full RVP and BAL cultures negative    - serial bronchoscopies for airway clearance and evaluation, last bronch 3/5 with evidence of bleeding in the LLL and the distal trachea/RMSB lateral aspect of the bifurcation. BAL ngtd  - s/p Dexamethasone followed DEXA/ARDS protocol (20mg x5 days then 10mg x5 days)  - C/w duoNeb q6 hours + 3% saline   - tolerating trach collar 40%, can try passy-david speaking valve (ordered)   - difficult glottic visualization due to large tongue upon intubation at [facility] 2/25 but able to visualize here easily with S4 video laryngoscope  - S/p Flex bronchoscopy tracheostomy #6 portex proximal XLT, placed 3/7 by CT surgery in OR, CTsx to remove trach sutures -> 3/17?  - CTsx to remove ECMO sutures ~10 days post decannulation ->  3/17      Gastrointestinal  #Diet   #Transaminitis - resolving  #constipation-resolved   - s/p Endoscopic percutaneous gastrostomy LUQ PEG 3/7   - tolerated tube feeds via PEG    - elevated Tbili likely iso hemolysis 2/2 high ECMO flows, mild transaminitis now improving   - hepatomegaly and mild ascites noted on CT A/P - no pocket to tap  - RUQ ultrasound with steatosis    - c/w bowel regimen with Senna and Miralax flexiseal in place   - nutrition following       Gu/Renal  #ELLA  #Hypernatremia   #Non-AG Metabolic Alkalosis  - ELLA - improved, likely ATN/vascular congestion  - good UO, overall net negative  - baires removed 3/10, passed TOV  - c/w free h20 300ml q4 hr for hypernatremia    - initially held lasix due to contraction alkalosis but now restarted standing lasix 40mg BID   - monitor urine and electrolytes closely        Infectious disease  #leukocytosis  #Pneumonia   #cellulitis   #febrile   - Leukocytosis, remains febrile post decannulation but c/o increased sputum  - Repeat and follow up UA, sputum and blood cultures   - Recently completed course of zosyn (2/26-3/9) to cover cellulitis/pneumonia, will resume empiric zosyn (3/11-  ) iso fevers  - s/p cefepime, linezolid,  aztihro 2/25-2/26 and  vanco (2/26-3/1)   - BCx x 2 2/24 and 2/26 currently NGTD, UCx 2/25 NGTD, tracheal aspirate Cx from 2/25 with normal commensal kyle   - full RVP negativestrep pneumo Ag and urine legionella negative   - s/p Mupiricon b/l nares for +MSSA  (2/26-3/3)  - BAL cultures all with NGTD  - ?penile meatus yellow discharge resolved, GC/chamydia and HIV negative   - candidal intertrigo +/- cellulitis of the pannus - clotrimazole cream 1% BID 2/26 -+ abx as per above    Hematology/DVT ppx  #Anticoagulation   #Thrombocytopenia  #Anemia   - Hb stable, thrombocytopenia likely r/t shearing 2/2 ECMO circuit now improved off circuit   - 4T score low probability of HIT, received heparin at OSH, PF4 neg, SHAWANDA negative   - c/w argatroban with goal 50-70 pending official VA duplex post decannulation   - LE duplex 2/25 neg  - s/p 1 u PRBC 2/26 for O2 delivery  - VA duplex to upper and lower ext negative for DVT 3/9 will continue argatroban until repeat VA duplex in 7 days 3/17    Endocrine  #DM  -A1c 6.7   - obesity with metabolic syndrome  - diabetes with steroid induced hyperglycemia, mild - ISS  - s/p DEXA/ARDS dexamethasone protocol   - pituitary workup largely unremarkable, steroids as above given recent weight gain but likely large component of volume though  - c/w FS Q6h and low ISS and adjust if needed now off steroids     SKin/Lines  #Access  #Cellulitis   #DTI with skin sloughing  #Blisters  - L subclavian TLC inserted 2/25-3/8  - L radial artery line 2/25-3/11  - Right IJ and Right Fem ECMO cannulas 2/25-3/7  - candidal intertrigo +/- cellulitis of the pannus - clotrimazole cream 1% BID 2/26 -+ abx as per above  - DTI to right buttocks (sloughing of skin) and B/L feet Blisters    - podiatry following consulted for  b/l foot blisters- recs vascular consult and on 3/4 lanced bilateral foot serous blister with mild serous drainage now with wound care and f/u out patient   - Vascular consulted b/l blisters likely pressure related, recommending z flow boots for offloading  - No arterial disease and b/l 3 vessel runoff on VA duplex performed 3/5. No indication for further invasive or non-invasive studies    GOC   - full code   - palliative involved was following while on ECMO   - Mother, father involved in care are next of kin/surrogates and patient's girlfriend/partner is also involved but defers to parents

## 2025-03-11 NOTE — PROGRESS NOTE ADULT - ASSESSMENT
38 yo M w/ class III obesity, pAfib (no AC), HTN, BEA (on CPAP), history of b/l papilledema attributed to pseudotumor cerebri s/p LP in 2024 with very high opening pressure, who is transferred for VV ECMO for ARDS and severe hypoxia. Patient cannulated for VV ECMO on 2/25 and transferred to [facility] for further management. Palliative care consulted for goals of care and complex medical decision making; patient currently on ECMO. 38 yo M w/ class III obesity, pAfib (no AC), HTN, BEA (on CPAP), history of b/l papilledema attributed to pseudotumor cerebri s/p LP in 2024 with very high opening pressure, who is transferred for VV ECMO for ARDS and severe hypoxia. Patient cannulated for VV ECMO on 2/25 and transferred to [facility] for further management. Palliative care consulted for goals of care and complex medical decision making as patient on ECMO circuit. Pt s/p trach and PEG, decannulated and undergoing further medical management.

## 2025-03-11 NOTE — PROGRESS NOTE ADULT - SUBJECTIVE AND OBJECTIVE BOX
Interval Events:   -Tolerated trach collar 40% overnight  -Dilaudid 1mg IVP x2 for c/o leg pain  -Started on clonidine to assist with precedex wean    HPI:  38 yo M w/ class III obesity, pAfib (no AC), HTN, BEA (on CPAP), history of b/l papilledema attributed to pseudotumor cerebri s/p LP in 2024 with very high opening pressure, who is transferred for VV ECMO for ARDS and severe hypoxia. Patient initially presented to [facility] on 2/24 for SOB and b/l LE edema. 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 He is a current every day smoker of Newports and marijuana, but does not drink or do other drugs. Currently lives with his mother. There is a long term partner in his life, but they are not , and they have an on/off relationship currently not very involved with each other as per the patient's mother and aunt.  At [facility] where he was getting an eval last year for shortness of breath, he had a sleep study and was diagnosed with sleep apnea, prescribed a PAP machine, which he has in his room but the mother is not sure how many nights per week he uses it. Recently, the last day or two, his mother and brother have been under the weather with URIs and the patient 2 days ago started to develop a ruynny nose, ough, some wheezing of the chest, as well as worsening shortness of breath and fevers at home. He called his aunt who told him to go get checked out and then he landed at the [facility] ED. Patient found to be reportedly hypoxemic to 70% on RA with worsening respiratory status/secretions and somnolence in the ED. Patient was intubated with difficulty (7 attempts) due to very large tongue secretions. Despite optimal vent management, patient remained hypoxemic. Unable to prone due to obesity. Patient cannulated for VV ECMO on 2/25 and transferred to [facility] for further management.     [facility] labs notable for MRSA PCR -, Fluvid -, urine legionella -, sputum gram stain  with GPC and GPR    CTA chest on 2/24 negative for pulmonary embolism, notable for patchy bilateral lower lobe opacities, hepatomegaly, mild ascites, edema/induration of the abdominal pannus.    LE duplex on 2/25 negative for DVT    TTE on 2/25 showed LV EF 61%, enlarged RV with TAPSE 2.1cm, ePASP at least 49 (Patient had 10/2024 TTE with normal LV and RV with ePASP 14).    Only home med is Lasix. Not on acetazolamide for pseudotumor or on Wegovy (was pending auth) (26 Feb 2025 01:48)      REVIEW OF SYSTEMS:  Constitutional: [ ] negative [x ] fevers [ ] chills [ ] weight loss [ ] weight gain  HEENT: [ ] negative [ ] dry eyes [ ] eye irritation [ ] postnasal drip [ ] nasal congestion  CV: [ ] negative  [ ] chest pain [ ] orthopnea [ ] palpitations [ ] murmur  Resp: [ ] negative [ ] cough [ ] shortness of breath [ ] dyspnea [ ] wheezing [ ] sputum [ ] hemoptysis  GI: [ ] negative [ ] nausea [ ] vomiting [ ] diarrhea [ ] constipation [ ] abd pain [ ] dysphagia   : [ ] negative [ ] dysuria [ ] nocturia [ ] hematuria [ ] increased urinary frequency  Musculoskeletal: [ ] negative [ ] back pain [ ] myalgias [ ] arthralgias [ ] fracture  Skin: [ ] negative [ ] rash [ ] itch  Neurological: [ ] negative [ ] headache [ ] dizziness [ ] syncope [ ] weakness [ ] numbness  Psychiatric: [ ] negative [ ] anxiety [ ] depression  Endocrine: [ ] negative [ ] diabetes [ ] thyroid problem  Hematologic/Lymphatic: [ ] negative [ ] anemia [ ] bleeding problem  Allergic/Immunologic: [ ] negative [ ] itchy eyes [ ] nasal discharge [ ] hives [ ] angioedema  [x ] All other systems negative  [ ] Unable to assess ROS because ________              OBJECTIVE:  ICU Vital Signs Last 24 Hrs  T(C): 38.3 (11 Mar 2025 04:00), Max: 38.7 (10 Mar 2025 12:00)  T(F): 100.9 (11 Mar 2025 04:00), Max: 101.7 (10 Mar 2025 12:00)  HR: 127 (11 Mar 2025 05:00) (95 - 148)  BP: --  BP(mean): --  ABP: 160/69 (11 Mar 2025 05:00) (121/46 - 206/101)  ABP(mean): 95 (11 Mar 2025 05:00) (65 - 128)  RR: 35 (11 Mar 2025 05:00) (15 - 43)  SpO2: 96% (11 Mar 2025 05:00) (96% - 100%)    O2 Parameters below as of 11 Mar 2025 04:09  Patient On (Oxygen Delivery Method): tracheostomy collar          Mode: standby    03-09 @ 07:01  -  03-10 @ 07:00  --------------------------------------------------------  IN: 4206.7 mL / OUT: 3280 mL / NET: 926.7 mL    03-10 @ 07:01  -  03-11 @ 06:28  --------------------------------------------------------  IN: 3015.1 mL / OUT: 2290 mL / NET: 725.1 mL      CAPILLARY BLOOD GLUCOSE      POCT Blood Glucose.: 131 mg/dL (11 Mar 2025 06:01)    Physical Exam:   Constitutional: ill appearing, no acute distress   HEENT: + PERRLA, EOMI, no drainage or redness  Neck: supple,  No JVD, trach midline  Respiratory: Ventilator assisted breath Sounds diminished bilaterally to auscultation, no accessory muscle use noted  Cardiovascular: Regular rate, regular rhythm, normal S1, S2; no murmurs or rub  Gastrointestinal: Obese, soft, non distended, no hepatosplenomegaly, normal bowel sounds  Extremities: + 2 peripheral edema, no cyanosis, no clubbing   Vascular: Equal and normal pulses: 2+ peripheral pulses throughout  Neurological: alert and oriented, non verbal d/t trach but can mouth words  Musculoskeletal: No joint swelling or deformity; no limitation of movement  Skin: warm, dry, well perfused, cellulitis to pannus area, DTI to right buttocks, plantar blisters b/l feet      LINES:    HOSPITAL MEDICATIONS:  Standing Meds:  albuterol/ipratropium for Nebulization 3 milliLiter(s) Nebulizer every 6 hours  amLODIPine   Tablet 5 milliGRAM(s) Oral daily  argatroban Infusion 1.7 MICROgram(s)/kG/Min IV Continuous <Continuous>  chlorhexidine 0.12% Liquid 15 milliLiter(s) Oral Mucosa every 12 hours  chlorhexidine 2% Cloths 1 Application(s) Topical <User Schedule>  cloNIDine 0.1 milliGRAM(s) Oral every 8 hours  clotrimazole 1% Cream 1 Application(s) Topical two times a day  dexMEDEtomidine Infusion 0.2 MICROgram(s)/kG/Hr IV Continuous <Continuous>  dextrose 50% Injectable 25 Gram(s) IV Push once  dextrose 50% Injectable 12.5 Gram(s) IV Push once  dextrose 50% Injectable 25 Gram(s) IV Push once  furosemide    Tablet 40 milliGRAM(s) Oral every 12 hours  glucagon  Injectable 1 milliGRAM(s) IntraMuscular once  insulin lispro (ADMELOG) corrective regimen sliding scale   SubCutaneous every 6 hours  lisinopril 5 milliGRAM(s) Oral every 24 hours  multivitamin/minerals/iron Oral Solution (CENTRUM) 15 milliLiter(s) Oral daily  nystatin    Suspension 453103 Unit(s) Oral every 6 hours  polyethylene glycol 3350 17 Gram(s) Oral daily  QUEtiapine 25 milliGRAM(s) Oral at bedtime  senna Syrup 5 milliLiter(s) Oral at bedtime  sodium chloride 3%  Inhalation 4 milliLiter(s) Inhalation every 6 hours      PRN Meds:      LABS:                        9.1    13.66 )-----------( 133      ( 11 Mar 2025 01:13 )             31.9     Hgb Trend: 9.1<--, 9.3<--, 9.7<--, 10.0<--, 10.6<--  03-11    148[H]  |  112[H]  |  22  ----------------------------<  142[H]  3.7   |  29  |  0.66    Ca    8.3[L]      11 Mar 2025 01:13  Phos  2.5     03-11  Mg     1.90     03-11    TPro  5.8[L]  /  Alb  2.7[L]  /  TBili  1.7[H]  /  DBili  x   /  AST  38  /  ALT  55[H]  /  AlkPhos  63  03-11    Creatinine Trend: 0.66<--, 0.66<--, 0.75<--, 0.74<--, 0.74<--, 0.79<--  PT/INR - ( 11 Mar 2025 01:13 )   PT: 21.4 sec;   INR: 1.86 ratio         PTT - ( 11 Mar 2025 01:13 )  PTT:43.8 sec  Urinalysis Basic - ( 11 Mar 2025 01:13 )    Color: x / Appearance: x / SG: x / pH: x  Gluc: 142 mg/dL / Ketone: x  / Bili: x / Urobili: x   Blood: x / Protein: x / Nitrite: x   Leuk Esterase: x / RBC: x / WBC x   Sq Epi: x / Non Sq Epi: x / Bacteria: x      Arterial Blood Gas:  03-11 @ 01:13  7.43/49/94/32/98.5/7.2  ABG lactate: --  Arterial Blood Gas:  03-10 @ 16:00  7.46/46/117/33/99.3/7.9  ABG lactate: --  Arterial Blood Gas:  03-10 @ 00:46  7.46/50/97/36/98.4/10.5  ABG lactate: --        MICROBIOLOGY:     Culture - Sputum (collected 10 Mar 2025 13:00)  Source: Trach Asp Tracheal Aspirate  Gram Stain (10 Mar 2025 23:26):    Few polymorphonuclear leukocytes per low power field    No Squamous epithelial cells per low power field    No organisms seen per oil power field      RADIOLOGY:  [ ] Reviewed and interpreted by me    EKG:     Interval Events:   -Tolerated trach collar 40% overnight  -Dilaudid 1mg IVP x2 for c/o leg pain  -Started on clonidine to assist with precedex wean    HPI:  40 yo M w/ class III obesity, pAfib (no AC), HTN, BEA (on CPAP), history of b/l papilledema attributed to pseudotumor cerebri s/p LP in 2024 with very high opening pressure, who is transferred for VV ECMO for ARDS and severe hypoxia. Patient initially presented to [facility] on 2/24 for SOB and b/l LE edema. 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 He is a current every day smoker of Newports and marijuana, but does not drink or do other drugs. Currently lives with his mother. There is a long term partner in his life, but they are not , and they have an on/off relationship currently not very involved with each other as per the patient's mother and aunt.  At [facility] where he was getting an eval last year for shortness of breath, he had a sleep study and was diagnosed with sleep apnea, prescribed a PAP machine, which he has in his room but the mother is not sure how many nights per week he uses it. Recently, the last day or two, his mother and brother have been under the weather with URIs and the patient 2 days ago started to develop a ruynny nose, ough, some wheezing of the chest, as well as worsening shortness of breath and fevers at home. He called his aunt who told him to go get checked out and then he landed at the [facility] ED. Patient found to be reportedly hypoxemic to 70% on RA with worsening respiratory status/secretions and somnolence in the ED. Patient was intubated with difficulty (7 attempts) due to very large tongue secretions. Despite optimal vent management, patient remained hypoxemic. Unable to prone due to obesity. Patient cannulated for VV ECMO on 2/25 and transferred to [facility] for further management.     [facility] labs notable for MRSA PCR -, Fluvid -, urine legionella -, sputum gram stain  with GPC and GPR    CTA chest on 2/24 negative for pulmonary embolism, notable for patchy bilateral lower lobe opacities, hepatomegaly, mild ascites, edema/induration of the abdominal pannus.    LE duplex on 2/25 negative for DVT    TTE on 2/25 showed LV EF 61%, enlarged RV with TAPSE 2.1cm, ePASP at least 49 (Patient had 10/2024 TTE with normal LV and RV with ePASP 14).    Only home med is Lasix. Not on acetazolamide for pseudotumor or on Wegovy (was pending auth) (26 Feb 2025 01:48)      REVIEW OF SYSTEMS:  Constitutional: [ ] negative [x ] fevers [ ] chills [ ] weight loss [ ] weight gain  HEENT: [ ] negative [ ] dry eyes [ ] eye irritation [ ] postnasal drip [ ] nasal congestion  CV: [ ] negative  [ ] chest pain [ ] orthopnea [ ] palpitations [ ] murmur  Resp: [ ] negative [ ] cough [ ] shortness of breath [ ] dyspnea [ ] wheezing [ ] sputum [ ] hemoptysis  GI: [ ] negative [ ] nausea [ ] vomiting [ ] diarrhea [ ] constipation [ ] abd pain [ ] dysphagia   : [ ] negative [ ] dysuria [ ] nocturia [ ] hematuria [ ] increased urinary frequency  Musculoskeletal: [ ] negative [ ] back pain [ ] myalgias [ ] arthralgias [ ] fracture  Skin: [ ] negative [ ] rash [ ] itch  Neurological: [ ] negative [ ] headache [ ] dizziness [ ] syncope [ ] weakness [ ] numbness  Psychiatric: [ ] negative [ ] anxiety [ ] depression  Endocrine: [ ] negative [ ] diabetes [ ] thyroid problem  Hematologic/Lymphatic: [ ] negative [ ] anemia [ ] bleeding problem  Allergic/Immunologic: [ ] negative [ ] itchy eyes [ ] nasal discharge [ ] hives [ ] angioedema  [x ] All other systems negative  [ ] Unable to assess ROS because ________              OBJECTIVE:  ICU Vital Signs Last 24 Hrs  T(C): 38.3 (11 Mar 2025 04:00), Max: 38.7 (10 Mar 2025 12:00)  T(F): 100.9 (11 Mar 2025 04:00), Max: 101.7 (10 Mar 2025 12:00)  HR: 127 (11 Mar 2025 05:00) (95 - 148)  BP: --  BP(mean): --  ABP: 160/69 (11 Mar 2025 05:00) (121/46 - 206/101)  ABP(mean): 95 (11 Mar 2025 05:00) (65 - 128)  RR: 35 (11 Mar 2025 05:00) (15 - 43)  SpO2: 96% (11 Mar 2025 05:00) (96% - 100%)    O2 Parameters below as of 11 Mar 2025 04:09  Patient On (Oxygen Delivery Method): tracheostomy collar          Mode: standby    03-09 @ 07:01  -  03-10 @ 07:00  --------------------------------------------------------  IN: 4206.7 mL / OUT: 3280 mL / NET: 926.7 mL    03-10 @ 07:01  -  03-11 @ 06:28  --------------------------------------------------------  IN: 3015.1 mL / OUT: 2290 mL / NET: 725.1 mL      CAPILLARY BLOOD GLUCOSE      POCT Blood Glucose.: 131 mg/dL (11 Mar 2025 06:01)    Physical Exam:   Constitutional: ill appearing, no acute distress   HEENT: + PERRLA, EOMI, no drainage or redness  Neck: supple,  No JVD, trach midline  Respiratory: Trach collar, breath Sounds diminished bilaterally to auscultation, no accessory muscle use noted  Cardiovascular: Regular rate, regular rhythm, normal S1, S2; no murmurs or rub  Gastrointestinal: Obese, soft, non distended, no hepatosplenomegaly, normal bowel sounds  Extremities: + 2 peripheral edema, no cyanosis, no clubbing   Vascular: Equal and normal pulses: 2+ peripheral pulses throughout  Neurological: alert and oriented, non verbal d/t trach but can mouth words  Musculoskeletal: No joint swelling or deformity; no limitation of movement  Skin: warm, dry, well perfused, cellulitis to pannus area, DTI to right buttocks, plantar blisters b/l feet      LINES:    HOSPITAL MEDICATIONS:  Standing Meds:  albuterol/ipratropium for Nebulization 3 milliLiter(s) Nebulizer every 6 hours  amLODIPine   Tablet 5 milliGRAM(s) Oral daily  argatroban Infusion 1.7 MICROgram(s)/kG/Min IV Continuous <Continuous>  chlorhexidine 0.12% Liquid 15 milliLiter(s) Oral Mucosa every 12 hours  chlorhexidine 2% Cloths 1 Application(s) Topical <User Schedule>  cloNIDine 0.1 milliGRAM(s) Oral every 8 hours  clotrimazole 1% Cream 1 Application(s) Topical two times a day  dexMEDEtomidine Infusion 0.2 MICROgram(s)/kG/Hr IV Continuous <Continuous>  dextrose 50% Injectable 25 Gram(s) IV Push once  dextrose 50% Injectable 12.5 Gram(s) IV Push once  dextrose 50% Injectable 25 Gram(s) IV Push once  furosemide    Tablet 40 milliGRAM(s) Oral every 12 hours  glucagon  Injectable 1 milliGRAM(s) IntraMuscular once  insulin lispro (ADMELOG) corrective regimen sliding scale   SubCutaneous every 6 hours  lisinopril 5 milliGRAM(s) Oral every 24 hours  multivitamin/minerals/iron Oral Solution (CENTRUM) 15 milliLiter(s) Oral daily  nystatin    Suspension 909429 Unit(s) Oral every 6 hours  polyethylene glycol 3350 17 Gram(s) Oral daily  QUEtiapine 25 milliGRAM(s) Oral at bedtime  senna Syrup 5 milliLiter(s) Oral at bedtime  sodium chloride 3%  Inhalation 4 milliLiter(s) Inhalation every 6 hours      PRN Meds:      LABS:                        9.1    13.66 )-----------( 133      ( 11 Mar 2025 01:13 )             31.9     Hgb Trend: 9.1<--, 9.3<--, 9.7<--, 10.0<--, 10.6<--  03-11    148[H]  |  112[H]  |  22  ----------------------------<  142[H]  3.7   |  29  |  0.66    Ca    8.3[L]      11 Mar 2025 01:13  Phos  2.5     03-11  Mg     1.90     03-11    TPro  5.8[L]  /  Alb  2.7[L]  /  TBili  1.7[H]  /  DBili  x   /  AST  38  /  ALT  55[H]  /  AlkPhos  63  03-11    Creatinine Trend: 0.66<--, 0.66<--, 0.75<--, 0.74<--, 0.74<--, 0.79<--  PT/INR - ( 11 Mar 2025 01:13 )   PT: 21.4 sec;   INR: 1.86 ratio         PTT - ( 11 Mar 2025 01:13 )  PTT:43.8 sec  Urinalysis Basic - ( 11 Mar 2025 01:13 )    Color: x / Appearance: x / SG: x / pH: x  Gluc: 142 mg/dL / Ketone: x  / Bili: x / Urobili: x   Blood: x / Protein: x / Nitrite: x   Leuk Esterase: x / RBC: x / WBC x   Sq Epi: x / Non Sq Epi: x / Bacteria: x      Arterial Blood Gas:  03-11 @ 01:13  7.43/49/94/32/98.5/7.2  ABG lactate: --  Arterial Blood Gas:  03-10 @ 16:00  7.46/46/117/33/99.3/7.9  ABG lactate: --  Arterial Blood Gas:  03-10 @ 00:46  7.46/50/97/36/98.4/10.5  ABG lactate: --        MICROBIOLOGY:     Culture - Sputum (collected 10 Mar 2025 13:00)  Source: Trach Asp Tracheal Aspirate  Gram Stain (10 Mar 2025 23:26):    Few polymorphonuclear leukocytes per low power field    No Squamous epithelial cells per low power field    No organisms seen per oil power field      RADIOLOGY:  [ ] Reviewed and interpreted by me    EKG:

## 2025-03-11 NOTE — CHART NOTE - NSCHARTNOTEFT_GEN_A_CORE
Argatroban held for at least 4 hours prior to removing Arterial line.  Pressure held for 5-10 minutes, site noted with no bleeding, ecchymosis or hematoma. Ulnar and Radial pulses present. Patient educated to minimize limb activity for at least one hour, site monitored as per policy, RN instructed report any abnormal changes.    Silvio Wing NP

## 2025-03-11 NOTE — PROGRESS NOTE ADULT - CRITICAL CARE ATTENDING COMMENT
38 yo M w/ class III obesity, pAfib (no AC), HTN, BEA (on CPAP), history of b/l pappiledema attributed to pseudotumor cerebri, who is transferred for VV ECMO for ARDS. Patient initially presented to [facility] on 2/24 for SOB and b/l LE edema. As per chart review, patient endorses some degree of SOB and b/l LE edema x 3 months with significant weight gain. As per ICU team, patient had positive sick contacts with viral URI 1 to 2 weeks PTA and since then has had worsening SOB. Patient found to be reportedly hypoxemic to 70% on RA with worsening respiratory status/secretions and somnolence in the ED. Patient was intubated with difficulty (7 attempts). Despite optimal vent management, patient remained hypoxemic. Unable to prone due to obesity. Patient cannulated for VV ECMO on 2/25 and transferred to [facility] for further management.  S/P Trach and PEG 3/7  ECMO decannulated 3/7.    doing well today - tolerating PS trials    #ARDS  #VV ECMO  #Multifocal Pneumonia  #Shock - Resolved  #pHTN  #ELLA - Resolved  #Class III Obesity  #pAfib - No events on tele this admission  #Acute hypoxemic respiratory failure  #Likely chronic hypercapnic respiratory failure  #HTN  - wean precedex today, on clonidine  - c/w Seroquel 25 qhs for now, monitor QTC  - c/w mechanical ventilatory support with PS Trials as tolerated, trach collar as tolerated  - c/w airway clearance  - Monitor blood gases  - slight leukocytosis, fever, will restart abx empirically  - Monitor BMP and UOP. Got diuretics overnight; will do lasix 40 mg po bid for now  - Tolerating trickle feeds, increase as tolerated  - Monitor BP on antihypertensives  - f/u post ECMO decannulation duplexes - will need empiric a/c for 7 days post decannulation  - c/w PT

## 2025-03-11 NOTE — PROGRESS NOTE ADULT - PROBLEM SELECTOR PLAN 2
LNOK: both parents: Erica Quintanilla (mother) [phone], Sunny (Dad) [phone]; Erica gave permission for team to speak with Sunny (dad) and also to Mili Beaver  (pt's aunt /Erica's sister)   Pt also has GF Brea Mari but she would not be legal decision maker;     Goal currently for continued treatment in the ICU with ECMO with hope for improvements; plan for trach/PEG on 3/7 with CT surgery    Palliative team continues to follow for ongoing support for patient on ECMO circuit. Discussed case with MICU team caring for patient who are continuing to provide communication with interdisciplinary teams and patient's family regarding patient's clinical status and medical updates. LNOK: both parents: Erica Quintanilla (mother) [phone], Sunny (Dad) [phone]; Erica gave permission for team to speak with Sunny (dad) and also to Mili Sd  (pt's aunt /Erica's sister)   Pt also has GF Brea Mari but she would not be legal decision maker and also defers to parents.    Goal currently for continued treatment in the ICU and possible downgrade as appropriate for continued medical management. Patient today confirmed that he would want his parents to make medical decisions on his behalf.     Palliative team continues to follow for ongoing support. Discussed case with MICU team caring for patient who are continuing to provide communication with interdisciplinary teams and patient's family regarding patient's clinical status and medical updates. LNOK: both parents: Erica Quintanilla (mother) [phone], Sunny (Dad) [phone]; Erica gave permission for team to speak with Sunny (dad) and also to Mili Beaver  (pt's aunt /Erica's sister)   Pt also has GF Brea Mari but she would not be legal decision maker and also defers to parents.    Goal currently for continued treatment in the ICU and possible downgrade as appropriate for continued medical management. Patient today confirmed that he would want his parents to make medical decisions on his behalf.     Palliative team continues to follow for ongoing support.

## 2025-03-12 DIAGNOSIS — I48.0 PAROXYSMAL ATRIAL FIBRILLATION: ICD-10-CM

## 2025-03-12 DIAGNOSIS — I10 ESSENTIAL (PRIMARY) HYPERTENSION: ICD-10-CM

## 2025-03-12 DIAGNOSIS — J69.0 PNEUMONITIS DUE TO INHALATION OF FOOD AND VOMIT: ICD-10-CM

## 2025-03-12 DIAGNOSIS — N17.9 ACUTE KIDNEY FAILURE, UNSPECIFIED: ICD-10-CM

## 2025-03-12 DIAGNOSIS — R06.89 OTHER ABNORMALITIES OF BREATHING: ICD-10-CM

## 2025-03-12 DIAGNOSIS — E66.01 MORBID (SEVERE) OBESITY DUE TO EXCESS CALORIES: ICD-10-CM

## 2025-03-12 DIAGNOSIS — R65.21 SEVERE SEPSIS WITH SEPTIC SHOCK: ICD-10-CM

## 2025-03-12 DIAGNOSIS — A41.9 SEPSIS, UNSPECIFIED ORGANISM: ICD-10-CM

## 2025-03-12 DIAGNOSIS — L03.311 CELLULITIS OF ABDOMINAL WALL: ICD-10-CM

## 2025-03-12 DIAGNOSIS — J81.1 CHRONIC PULMONARY EDEMA: ICD-10-CM

## 2025-03-12 DIAGNOSIS — D72.829 ELEVATED WHITE BLOOD CELL COUNT, UNSPECIFIED: ICD-10-CM

## 2025-03-12 DIAGNOSIS — J80 ACUTE RESPIRATORY DISTRESS SYNDROME: ICD-10-CM

## 2025-03-12 LAB
ALBUMIN SERPL ELPH-MCNC: 2.7 G/DL — LOW (ref 3.3–5)
ALP SERPL-CCNC: 68 U/L — SIGNIFICANT CHANGE UP (ref 40–120)
ALT FLD-CCNC: 56 U/L — HIGH (ref 4–41)
ANION GAP SERPL CALC-SCNC: 8 MMOL/L — SIGNIFICANT CHANGE UP (ref 7–14)
APPEARANCE UR: ABNORMAL
APTT BLD: 53.4 SEC — HIGH (ref 24.5–35.6)
APTT BLD: 53.8 SEC — HIGH (ref 24.5–35.6)
AST SERPL-CCNC: 35 U/L — SIGNIFICANT CHANGE UP (ref 4–40)
BACTERIA # UR AUTO: ABNORMAL /HPF
BASOPHILS # BLD AUTO: 0.02 K/UL — SIGNIFICANT CHANGE UP (ref 0–0.2)
BASOPHILS NFR BLD AUTO: 0.1 % — SIGNIFICANT CHANGE UP (ref 0–2)
BILIRUB SERPL-MCNC: 1.6 MG/DL — HIGH (ref 0.2–1.2)
BILIRUB UR-MCNC: ABNORMAL
BUN SERPL-MCNC: 17 MG/DL — SIGNIFICANT CHANGE UP (ref 7–23)
CALCIUM SERPL-MCNC: 8.2 MG/DL — LOW (ref 8.4–10.5)
CAST: 0 /LPF — SIGNIFICANT CHANGE UP (ref 0–4)
CHLORIDE SERPL-SCNC: 112 MMOL/L — HIGH (ref 98–107)
CO2 SERPL-SCNC: 26 MMOL/L — SIGNIFICANT CHANGE UP (ref 22–31)
COLOR SPEC: SIGNIFICANT CHANGE UP
CREAT SERPL-MCNC: 0.71 MG/DL — SIGNIFICANT CHANGE UP (ref 0.5–1.3)
DIFF PNL FLD: NEGATIVE — SIGNIFICANT CHANGE UP
EGFR: 121 ML/MIN/1.73M2 — SIGNIFICANT CHANGE UP
EGFR: 121 ML/MIN/1.73M2 — SIGNIFICANT CHANGE UP
EOSINOPHIL # BLD AUTO: 0.18 K/UL — SIGNIFICANT CHANGE UP (ref 0–0.5)
EOSINOPHIL NFR BLD AUTO: 1.1 % — SIGNIFICANT CHANGE UP (ref 0–6)
FLUAV AG NPH QL: SIGNIFICANT CHANGE UP
FLUBV AG NPH QL: SIGNIFICANT CHANGE UP
GLUCOSE BLDC GLUCOMTR-MCNC: 120 MG/DL — HIGH (ref 70–99)
GLUCOSE BLDC GLUCOMTR-MCNC: 127 MG/DL — HIGH (ref 70–99)
GLUCOSE BLDC GLUCOMTR-MCNC: 132 MG/DL — HIGH (ref 70–99)
GLUCOSE BLDC GLUCOMTR-MCNC: 133 MG/DL — HIGH (ref 70–99)
GLUCOSE BLDC GLUCOMTR-MCNC: 144 MG/DL — HIGH (ref 70–99)
GLUCOSE SERPL-MCNC: 140 MG/DL — HIGH (ref 70–99)
GLUCOSE UR QL: NEGATIVE MG/DL — SIGNIFICANT CHANGE UP
HCT VFR BLD CALC: 31.9 % — LOW (ref 39–50)
HGB BLD-MCNC: 9 G/DL — LOW (ref 13–17)
IANC: 13.28 K/UL — HIGH (ref 1.8–7.4)
IMM GRANULOCYTES NFR BLD AUTO: 0.8 % — SIGNIFICANT CHANGE UP (ref 0–0.9)
INR BLD: 2.15 RATIO — HIGH (ref 0.85–1.16)
INR BLD: 2.21 RATIO — HIGH (ref 0.85–1.16)
KETONES UR-MCNC: ABNORMAL MG/DL
LEUKOCYTE ESTERASE UR-ACNC: ABNORMAL
LYMPHOCYTES # BLD AUTO: 1.62 K/UL — SIGNIFICANT CHANGE UP (ref 1–3.3)
LYMPHOCYTES # BLD AUTO: 9.5 % — LOW (ref 13–44)
MAGNESIUM SERPL-MCNC: 1.9 MG/DL — SIGNIFICANT CHANGE UP (ref 1.6–2.6)
MCHC RBC-ENTMCNC: 23.4 PG — LOW (ref 27–34)
MCHC RBC-ENTMCNC: 28.2 G/DL — LOW (ref 32–36)
MCV RBC AUTO: 82.9 FL — SIGNIFICANT CHANGE UP (ref 80–100)
MONOCYTES # BLD AUTO: 1.74 K/UL — HIGH (ref 0–0.9)
MONOCYTES NFR BLD AUTO: 10.3 % — SIGNIFICANT CHANGE UP (ref 2–14)
MRSA PCR RESULT.: SIGNIFICANT CHANGE UP
NEUTROPHILS # BLD AUTO: 13.28 K/UL — HIGH (ref 1.8–7.4)
NEUTROPHILS NFR BLD AUTO: 78.2 % — HIGH (ref 43–77)
NITRITE UR-MCNC: NEGATIVE — SIGNIFICANT CHANGE UP
NRBC # BLD AUTO: 0 K/UL — SIGNIFICANT CHANGE UP (ref 0–0)
NRBC # FLD: 0 K/UL — SIGNIFICANT CHANGE UP (ref 0–0)
NRBC BLD AUTO-RTO: 0 /100 WBCS — SIGNIFICANT CHANGE UP (ref 0–0)
PH UR: 8 — SIGNIFICANT CHANGE UP (ref 5–8)
PHOSPHATE SERPL-MCNC: 3 MG/DL — SIGNIFICANT CHANGE UP (ref 2.5–4.5)
PLATELET # BLD AUTO: 169 K/UL — SIGNIFICANT CHANGE UP (ref 150–400)
POTASSIUM SERPL-MCNC: 3.9 MMOL/L — SIGNIFICANT CHANGE UP (ref 3.5–5.3)
POTASSIUM SERPL-SCNC: 3.9 MMOL/L — SIGNIFICANT CHANGE UP (ref 3.5–5.3)
PROT SERPL-MCNC: 5.9 G/DL — LOW (ref 6–8.3)
PROT UR-MCNC: 30 MG/DL
PROTHROM AB SERPL-ACNC: 24.8 SEC — HIGH (ref 9.9–13.4)
PROTHROM AB SERPL-ACNC: 25.4 SEC — HIGH (ref 9.9–13.4)
RBC # BLD: 3.85 M/UL — LOW (ref 4.2–5.8)
RBC # FLD: 23.2 % — HIGH (ref 10.3–14.5)
RBC CASTS # UR COMP ASSIST: 3 /HPF — SIGNIFICANT CHANGE UP (ref 0–4)
REVIEW: SIGNIFICANT CHANGE UP
RSV RNA NPH QL NAA+NON-PROBE: SIGNIFICANT CHANGE UP
S AUREUS DNA NOSE QL NAA+PROBE: SIGNIFICANT CHANGE UP
SARS-COV-2 RNA SPEC QL NAA+PROBE: SIGNIFICANT CHANGE UP
SODIUM SERPL-SCNC: 146 MMOL/L — HIGH (ref 135–145)
SP GR SPEC: 1.02 — SIGNIFICANT CHANGE UP (ref 1–1.03)
SQUAMOUS # UR AUTO: 0 /HPF — SIGNIFICANT CHANGE UP (ref 0–5)
UROBILINOGEN FLD QL: 4 MG/DL (ref 0.2–1)
WBC # BLD: 16.97 K/UL — HIGH (ref 3.8–10.5)
WBC # FLD AUTO: 16.97 K/UL — HIGH (ref 3.8–10.5)
WBC UR QL: 4 /HPF — SIGNIFICANT CHANGE UP (ref 0–5)

## 2025-03-12 RX ORDER — HYDROMORPHONE/SOD CHLOR,ISO/PF 2 MG/10 ML
1 SYRINGE (ML) INJECTION ONCE
Refills: 0 | Status: DISCONTINUED | OUTPATIENT
Start: 2025-03-12 | End: 2025-03-12

## 2025-03-12 RX ORDER — PIPERACILLIN-TAZO-DEXTROSE,ISO 2.25G/50ML
4.5 IV SOLUTION, PIGGYBACK PREMIX FROZEN(ML) INTRAVENOUS EVERY 8 HOURS
Refills: 0 | Status: DISCONTINUED | OUTPATIENT
Start: 2025-03-12 | End: 2025-03-17

## 2025-03-12 RX ORDER — ACETAMINOPHEN 500 MG/5ML
1000 LIQUID (ML) ORAL ONCE
Refills: 0 | Status: COMPLETED | OUTPATIENT
Start: 2025-03-12 | End: 2025-03-12

## 2025-03-12 RX ORDER — FUROSEMIDE 10 MG/ML
40 INJECTION INTRAMUSCULAR; INTRAVENOUS DAILY
Refills: 0 | Status: DISCONTINUED | OUTPATIENT
Start: 2025-03-13 | End: 2025-03-13

## 2025-03-12 RX ORDER — VANCOMYCIN HCL IN 5 % DEXTROSE 1.5G/250ML
1250 PLASTIC BAG, INJECTION (ML) INTRAVENOUS EVERY 8 HOURS
Refills: 0 | Status: DISCONTINUED | OUTPATIENT
Start: 2025-03-12 | End: 2025-03-13

## 2025-03-12 RX ADMIN — ARGATROBAN 24.1 MICROGRAM(S)/KG/MIN: 100 INJECTION, SOLUTION INTRAVENOUS at 20:02

## 2025-03-12 RX ADMIN — Medication 166.67 MILLIGRAM(S): at 11:31

## 2025-03-12 RX ADMIN — Medication 500000 UNIT(S): at 00:50

## 2025-03-12 RX ADMIN — Medication 4 MILLILITER(S): at 14:56

## 2025-03-12 RX ADMIN — Medication 400 MILLIGRAM(S): at 22:39

## 2025-03-12 RX ADMIN — FUROSEMIDE 40 MILLIGRAM(S): 10 INJECTION INTRAMUSCULAR; INTRAVENOUS at 06:40

## 2025-03-12 RX ADMIN — CLOTRIMAZOLE 1 APPLICATION(S): 1 CREAM TOPICAL at 06:41

## 2025-03-12 RX ADMIN — Medication 500000 UNIT(S): at 19:36

## 2025-03-12 RX ADMIN — ARGATROBAN 24.1 MICROGRAM(S)/KG/MIN: 100 INJECTION, SOLUTION INTRAVENOUS at 21:55

## 2025-03-12 RX ADMIN — Medication 1 MILLIGRAM(S): at 06:39

## 2025-03-12 RX ADMIN — IPRATROPIUM BROMIDE AND ALBUTEROL SULFATE 3 MILLILITER(S): .5; 2.5 SOLUTION RESPIRATORY (INHALATION) at 20:44

## 2025-03-12 RX ADMIN — Medication 4 MILLILITER(S): at 03:14

## 2025-03-12 RX ADMIN — Medication 25 GRAM(S): at 13:44

## 2025-03-12 RX ADMIN — Medication 1 APPLICATION(S): at 06:42

## 2025-03-12 RX ADMIN — Medication 1000 MILLIGRAM(S): at 07:00

## 2025-03-12 RX ADMIN — ARGATROBAN 24.1 MICROGRAM(S)/KG/MIN: 100 INJECTION, SOLUTION INTRAVENOUS at 12:18

## 2025-03-12 RX ADMIN — Medication 0.2 MILLIGRAM(S): at 13:44

## 2025-03-12 RX ADMIN — Medication 500000 UNIT(S): at 11:25

## 2025-03-12 RX ADMIN — Medication 15 MILLILITER(S): at 11:25

## 2025-03-12 RX ADMIN — Medication 25 GRAM(S): at 00:50

## 2025-03-12 RX ADMIN — LISINOPRIL 5 MILLIGRAM(S): 5 TABLET ORAL at 11:25

## 2025-03-12 RX ADMIN — Medication 400 MILLIGRAM(S): at 06:39

## 2025-03-12 RX ADMIN — QUETIAPINE FUMARATE 25 MILLIGRAM(S): 25 TABLET ORAL at 22:10

## 2025-03-12 RX ADMIN — Medication 25 GRAM(S): at 06:41

## 2025-03-12 RX ADMIN — Medication 500000 UNIT(S): at 06:42

## 2025-03-12 RX ADMIN — Medication 4 MILLILITER(S): at 08:19

## 2025-03-12 RX ADMIN — Medication 1 MILLIGRAM(S): at 07:00

## 2025-03-12 RX ADMIN — AMLODIPINE BESYLATE 10 MILLIGRAM(S): 10 TABLET ORAL at 06:39

## 2025-03-12 RX ADMIN — Medication 0.2 MILLIGRAM(S): at 22:10

## 2025-03-12 RX ADMIN — IPRATROPIUM BROMIDE AND ALBUTEROL SULFATE 3 MILLILITER(S): .5; 2.5 SOLUTION RESPIRATORY (INHALATION) at 14:56

## 2025-03-12 RX ADMIN — Medication 166.67 MILLIGRAM(S): at 18:35

## 2025-03-12 RX ADMIN — Medication 400 MILLIGRAM(S): at 15:15

## 2025-03-12 RX ADMIN — IPRATROPIUM BROMIDE AND ALBUTEROL SULFATE 3 MILLILITER(S): .5; 2.5 SOLUTION RESPIRATORY (INHALATION) at 08:19

## 2025-03-12 RX ADMIN — CLOTRIMAZOLE 1 APPLICATION(S): 1 CREAM TOPICAL at 19:36

## 2025-03-12 RX ADMIN — ARGATROBAN 24.1 MICROGRAM(S)/KG/MIN: 100 INJECTION, SOLUTION INTRAVENOUS at 19:31

## 2025-03-12 RX ADMIN — Medication 0.2 MILLIGRAM(S): at 06:39

## 2025-03-12 RX ADMIN — Medication 5 MILLILITER(S): at 22:10

## 2025-03-12 RX ADMIN — Medication 25 GRAM(S): at 22:10

## 2025-03-12 RX ADMIN — Medication 15 MILLILITER(S): at 06:41

## 2025-03-12 RX ADMIN — Medication 4 MILLILITER(S): at 20:44

## 2025-03-12 RX ADMIN — Medication 2 MILLIGRAM(S): at 16:25

## 2025-03-12 NOTE — PROGRESS NOTE ADULT - ASSESSMENT
40 yo M w/ class III obesity, pAfib (no AC), HTN, BEA (on CPAP), history of b/l papilledema attributed to pseudotumor cerebri, who is transferred from [place] on 2/26 for VV ECMO 2/2 ARDS. Patient initially presented to [place] on 2/24 for SOB and b/l LE edema. As per chart review, patient endorses some degree of SOB and b/l LE edema x 3 months with significant weight gain. As per ICU team, patient had positive sick contacts with viral URI 1 to 2 weeks PTA and since then has had worsening SOB. Patient found to be reportedly hypoxemic to 70% on RA with worsening respiratory status/secretions and somnolence in the ED. Patient was a difficult intubation (~6 attempts). Despite optimal vent management, patient remained hypoxemic. Unable to prone due to obesity. Patient cannulated for VV ECMO on 2/25 and transferred to [facility] for further management. Patient now decannulated and s/p trach/PEG placed 3/7. Currently improving, alert and oriented weaning sedation.    Neuro  #Mental status   #pseudotumor cerebri  istory of pseudotumor cerebri s/p LP under fluoro in 2024 with high opening pressure with MRI 2024 also showed possible pituitary mass but unclear because of pressure effect from pseudotumor cerebri causing partially empty sella. Considering to start acetazolamide outpatient but never started   prolactin high (30), defer checking cortisol axis given already received steroids but should be investigated, ACTH (<1.5) low but should be suppressed iso steroids   concern for pituitary adenoma, however TSH .79 normal,  fT4 0.9 normal, and low T3 67  - weaned off Dilaudid and now on low dose Precedex with plan to wean as tolerated  - started on clonidine 0.1mg q8 hrs to assist with precedex wean, will increase dose to 0.2mg q8hrs iso HTN and tachycardia  - patient now awake, calm, communicative, EASLEY, following commands   - currently on Seroquel 25mg QHS    - PT/OT following OOB via hailey lifted to chair, able to perform exercises sitting up edge of bed     Cardiovascular  #HTN   #Atrial fibrillation   Hx of a. fib not on AC has not had any episodes during admission   TTE from 2024 did not demonstrate elevation of pulmonary pressures, but now with severe PH suggesting chronicity on TTE at    TTE on 2/25 showed LV EF 61%, enlarged RV with TAPSE 2.1cm, ePASP at least 49 (Patient had 10/2024 TTE with normal LV and RV with ePASP 14).  ROBERT 3/1 showing mild TR, enlarged RV with normal LV/RV function. PEEP increased to 24 without signs of RV dilation  troponins initially elevated, peaked at 162 however downtrended likely from RV dilation and strain - EKG neg for any typical ischemia  repeat TTE 3/11  Normal LV, EF 66 %, RV not well visualized. enlarged right ventricular cavity size, reduced right ventricular systolic function.  (TAPSE) is 1.6 cm. No evidence of pulmonary hypertension.    - amlodipine 10mg daily + lisinopril 5mg daily  - ECG daily to monitor QTc  as has been on higher end but will watch closely on Seroquel         Pulmonary  #Pulmonary HTN  #ARDS  #Multifocal Pneumonia   #BEA   - history of diagnosed BEA/?OHS intermittent compliance to home PAP  - c/f acute on suspected chronic pulmonary hypertension in setting of possible BEA/OHS c/b pneumonia, ARDS, and pulmonary edema requiring difficult intubation on 2/25 and VVecmo 2/25-3/7. Now s/p tracheostomy 3/7 with weaning trials.   - CTA chest on 2/24 negative for pulmonary embolism, notable for patchy bilateral lower lobe opacities, hepatomegaly, mild ascites, edema/induration of the abdominal pannus.  - Full RVP and BAL cultures negative    - serial bronchoscopies for airway clearance and evaluation, last bronch 3/5 with evidence of bleeding in the LLL and the distal trachea/RMSB lateral aspect of the bifurcation. BAL ngtd  - s/p Dexamethasone followed DEXA/ARDS protocol (20mg x5 days then 10mg x5 days)  - C/w duoNeb q6 hours + 3% saline   - tolerating trach collar 40%, can try passy-david speaking valve (ordered)   - difficult glottic visualization due to large tongue upon intubation at [facility] 2/25 but able to visualize here easily with S4 video laryngoscope  - S/p Flex bronchoscopy tracheostomy #6 portex proximal XLT, placed 3/7 by CT surgery in OR, CTsx to remove trach sutures -> 3/17?  - CTsx to remove ECMO sutures ~10 days post decannulation ->  3/17      Gastrointestinal  #Diet   #Transaminitis - resolving  #constipation-resolved   s/p Endoscopic percutaneous gastrostomy LUQ PEG 3/7   tolerated tube feeds via PEG    elevated Tbili likely iso hemolysis 2/2 high ECMO flows, mild transaminitis now improving   hepatomegaly and mild ascites noted on CT A/P - no pocket to tap  RUQ ultrasound with steatosis    - c/w bowel regimen with Senna and Miralax flexiseal in place   - nutrition following       Gu/Renal  #ELLA  #Hypernatremia   #Non-AG Metabolic Alkalosis  ELLA - improved, likely ATN/vascular congestion  baires removed 3/10, passed TOV  - c/w free h20 300ml q4 hr for hypernatremia    - initially held lasix due to contraction alkalosis but now restarted standing lasix 40mg BID   - monitor urine and electrolytes closely        Infectious disease  #leukocytosis  #Pneumonia   #cellulitis   #febrile   Leukocytosis, remains febrile post decannulation but c/o increased sputum  Repeat and follow up UA, sputum and blood cultures   Recently completed course of zosyn (2/26-3/9) to cover cellulitis/pneumonia, will resume empiric zosyn (3/11-  ) iso fevers  - s/p cefepime, linezolid,  aztihro 2/25-2/26 and  vanco (2/26-3/1)   - full RVP negativestrep pneumo Ag and urine legionella negative   - s/p Mupiricon b/l nares for +MSSA  (2/26-3/3)  - BAL cultures all with NGTD  - ?penile meatus yellow discharge resolved, GC/chamydia and HIV negative   - candidal intertrigo +/- cellulitis of the pannus - clotrimazole cream 1% BID 2/26 -+ abx as per above    Hematology/DVT ppx  #Anticoagulation   #Thrombocytopenia  #Anemia   Hb stable, thrombocytopenia likely r/t shearing 2/2 ECMO circuit now improved off circuit   4T score low probability of HIT, received heparin at OSH, PF4 neg, SHAWANDA negative   LE duplex 2/25 neg    - c/w argatroban with goal 50-70 pending official [facility] duplex post decannulation   - VA duplex to upper and lower ext negative for DVT 3/9 will continue argatroban until repeat VA duplex in 7 days 3/17    Endocrine  #DM  A1c 6.7; obesity with metabolic syndrome  diabetes with steroid induced hyperglycemia, mild - ISS  s/p DEXA/ARDS dexamethasone protocol   pituitary workup largely unremarkable, steroids as above given recent weight gain but likely large component of volume  - c/w FS Q6h and low ISS and adjust if needed now off steroids     SKin/Lines  #Access  #Cellulitis   #DTI with skin sloughing  #Blisters  - L subclavian TLC inserted 2/25-3/8  - L radial artery line 2/25-3/11  - Right IJ and Right Fem ECMO cannulas 2/25-3/7  - candidal intertrigo +/- cellulitis of the pannus - clotrimazole cream 1% BID 2/26 -+ abx as per above  - DTI to right buttocks (sloughing of skin) and B/L feet Blisters    - podiatry following consulted for  b/l foot blisters- recs vascular consult and on 3/4 lanced bilateral foot serous blister with mild serous drainage now with wound care and f/u out patient   - Vascular consulted b/l blisters likely pressure related, recommending z flow boots for offloading  - No arterial disease and b/l 3 vessel runoff on VA duplex performed 3/5. No indication for further invasive or non-invasive studies    GOC   - full code   - palliative involved was following while on ECMO   - Mother, father involved in care are next of kin/surrogates and patient's girlfriend/partner is also involved but defers to parents     40 yo M w/ class III obesity, pAfib (no AC), HTN, BEA (on CPAP), history of b/l papilledema attributed to pseudotumor cerebri, who is transferred from [place] on 2/26 for VV ECMO 2/2 ARDS. Patient initially presented to [place] on 2/24 for SOB and b/l LE edema. As per chart review, patient endorses some degree of SOB and b/l LE edema x 3 months with significant weight gain. As per ICU team, patient had positive sick contacts with viral URI 1 to 2 weeks PTA and since then has had worsening SOB. Patient found to be reportedly hypoxemic to 70% on RA with worsening respiratory status/secretions and somnolence in the ED. Patient was a difficult intubation (~6 attempts). Despite optimal vent management, patient remained hypoxemic. Unable to prone due to obesity. Patient cannulated for VV ECMO on 2/25 and transferred to [facility] for further management. Patient now decannulated and s/p trach/PEG placed 3/7. Currently improving, alert and oriented weaning sedation.    Neuro  #Mental status   #pseudotumor cerebri  hx of pseudotumor cerebri s/p LP under fluoro in 2024 with high opening pressure with MRI 2024 also showed possible pituitary mass but unclear because of pressure effect from pseudotumor cerebri causing partially empty sella. Considering to start acetazolamide outpatient but never started  Prolactin high (30), defer checking cortisol axis given already received steroids but should be investigated, ACTH (<1.5) low but should be suppressed iso steroids  Concern for pituitary adenoma, however TSH .79 normal,  fT4 0.9 normal, and low T3 67  - Weaned off precedex, on clonidine 0.2mg q8hrs iso HTN and tachycardia  - currently on Seroquel 25mg QHS    - PT/OT following    Cardiovascular  #HTN   #Atrial fibrillation   Hx of a. fib not on AC has not had any episodes during admission   TTE from 2024 did not demonstrate elevation of pulmonary pressures, but now with severe PH suggesting chronicity on TTE at    TTE on 2/25 showed LV EF 61%, enlarged RV with TAPSE 2.1cm, ePASP at least 49 (Patient had 10/2024 TTE with normal LV and RV with ePASP 14).  ROBERT 3/1 showing mild TR, enlarged RV with normal LV/RV function. PEEP increased to 24 without signs of RV dilation  troponins initially elevated, peaked at 162 however downtrended likely from RV dilation and strain - EKG neg for any typical ischemia  repeat TTE 3/11  Normal LV, EF 66 %, RV not well visualized. enlarged right ventricular cavity size, reduced right ventricular systolic function.  (TAPSE) is 1.6 cm. No evidence of pulmonary hypertension.    - amlodipine 10mg daily + lisinopril 5mg daily  - ECG daily to monitor QTc  as has been on higher end but will watch closely on Seroquel         Pulmonary  #Pulmonary HTN  #ARDS  #Multifocal Pneumonia   #BEA  #Trach-dependent   History of diagnosed BEA/?OHS intermittent compliance to home PAP  c/f acute on suspected chronic pulmonary hypertension in setting of possible BEA/OHS c/b pneumonia, ARDS, and pulmonary edema requiring difficult intubation on 2/25 and VVecmo 2/25-3/7. Now s/p tracheostomy 3/7 with weaning trials.   CTA chest on 2/24 negative for pulmonary embolism, notable for patchy bilateral lower lobe opacities, hepatomegaly, mild ascites, edema/induration of the abdominal pannus.  Full RVP and BAL cultures negative    s/p Dexamethasone followed DEXA/ARDS protocol (20mg x5 days then 10mg x5 days)    - C/w duoNeb q6 hours + 3% saline   - c/w speaking valve   - S/p Flex bronchoscopy tracheostomy #6 portex proximal XLT, placed 3/7 by CT surgery in OR, CTsx to remove trach sutures -> 3/17?  - CTsx to remove ECMO sutures ~10 days post decannulation ->  3/17      Gastrointestinal  #Diet - w/ peg   #Transaminitis - resolving  #constipation-resolved   s/p Endoscopic percutaneous gastrostomy LUQ PEG 3/7   tolerated tube feeds via PEG    elevated Tbili likely iso hemolysis 2/2 high ECMO flows, mild transaminitis now improving   hepatomegaly and mild ascites noted on CT A/P - no pocket to tap  RUQ ultrasound with steatosis      - c/w bowel regimen with Senna and Miralax flexiseal in place   - nutrition following       Gu/Renal  #ELLA  #Hypernatremia   #Non-AG Metabolic Alkalosis  ELLA - improved, likely ATN/vascular congestion  baires removed 3/10, passed TOV    - Free water flushes 350ml q4 hr for hypernatremia    - Lasix 40mg PO qD   - monitor urine and electrolytes closely        Infectious disease  #leukocytosis  #Pneumonia   #cellulitis   #febrile   Leukocytosis, remains febrile post decannulation but c/o increased sputum  Repeat and follow up UA, sputum and blood cultures   Recently completed course of zosyn (2/26-3/9) to cover cellulitis/pneumonia  s/p cefepime, linezolid,  aztihro 2/25-2/26 and  vanco (2/26-3/1)   full RVP negativestrep pneumo Ag and urine legionella negative   s/p Mupiricon b/l nares for +MSSA  (2/26-3/3)  BAL cultures all with NGTD  ?penile meatus yellow discharge resolved, GC/chamydia and HIV negative     - start vanc, can d/c if repeat mrsa swab negative  - c/w zosyn; if mrsa negative switch to cefepime   - candidal intertrigo +/- cellulitis of the pannus - clotrimazole cream 1% BID 2/26 -+ abx as per above      Hematology/DVT ppx  #Anticoagulation   #Thrombocytopenia  #Anemia   Hb stable, thrombocytopenia likely r/t shearing 2/2 ECMO circuit now improved off circuit   4T score low probability of HIT, received heparin at OSH, PF4 neg, SHAWANDA negative   LE duplex 2/25 neg    - c/w argatroban with goal 50-70 pending official VA duplex post decannulation   - VA duplex to upper and lower ext negative for DVT 3/9 will continue argatroban until repeat VA duplex in 7 days 3/17    Endocrine  #DM  A1c 6.7; obesity with metabolic syndrome  diabetes with steroid induced hyperglycemia, mild - ISS  s/p DEXA/ARDS dexamethasone protocol   pituitary workup largely unremarkable, steroids as above given recent weight gain but likely large component of volume    - c/w FS Q6h and low ISS and adjust if needed now off steroids     SKin/Lines  #Access  #Cellulitis   #DTI with skin sloughing  #Blisters  - L subclavian TLC inserted 2/25-3/8  - L radial artery line 2/25-3/11  - Right IJ and Right Fem ECMO cannulas 2/25-3/7  - candidal intertrigo +/- cellulitis of the pannus - clotrimazole cream 1% BID 2/26 -+ abx as per above  - DTI to right buttocks (sloughing of skin) and B/L feet Blisters    - podiatry following consulted for  b/l foot blisters- recs vascular consult and on 3/4 lanced bilateral foot serous blister with mild serous drainage now with wound care and f/u out patient   - Vascular consulted b/l blisters likely pressure related, recommending z flow boots for offloading  - No arterial disease and b/l 3 vessel runoff on VA duplex performed 3/5. No indication for further invasive or non-invasive studies    GOC   - full code   - palliative involved was following while on ECMO   - Mother, father involved in care are next of kin/surrogates and patient's girlfriend/partner is also involved but defers to parents

## 2025-03-12 NOTE — PROGRESS NOTE ADULT - SUBJECTIVE AND OBJECTIVE BOX
MICU Progress Note    SUBJECTIVE  INTERVAL EVENTS:  - NAEON    OBJECTIVE  Physical Exam:   PHYSICAL EXAM:  GENERAL: No acute distress, well-developed  HEAD:  Atraumatic, Normocephalic  EYES: EOMI, PERRLA, conjunctiva and sclera clear  NECK: Supple, no lymphadenopathy, no JVD  CHEST/LUNG: CTAB; No wheezes, rales, or rhonchi  HEART: Regular rate and rhythm. Normal S1/S2. No murmurs, rubs, or gallops  ABDOMEN: Soft, non-tender, non-distended; normal bowel sounds, no organomegaly  EXTREMITIES:  2+ peripheral pulses b/l, No clubbing, cyanosis, or edema  NEUROLOGY: A&O x 3, no focal deficits  SKIN: No rashes or lesions    ICU Vital Signs Last 24 Hrs  T(F): 100.8 (12 Mar 2025 00:00), Max: 101.7 (11 Mar 2025 17:00)  HR: 133 (12 Mar 2025 03:01) (120 - 149)  BP: 133/74 (12 Mar 2025 02:00) (114/58 - 150/83)  BP(mean): 91 (12 Mar 2025 02:00) (74 - 99)  ABP: 183/88 (11 Mar 2025 17:00) (165/80 - 198/85)  ABP(mean): 114 (11 Mar 2025 17:00) (99 - 123)  RR: 35 (12 Mar 2025 02:00) (26 - 47)  SpO2: 100% (12 Mar 2025 03:01) (98% - 100%)    I&O's Summary    11 Mar 2025 07:01  -  12 Mar 2025 07:00  --------------------------------------------------------  IN: 1904.4 mL / OUT: 30892 mL / NET: -8495.6 mL      Mode: standby    ECMO Day#     Adult Advanced Hemodynamics Last 24 Hrs  CVP(mm Hg): --  CVP(cm H2O): --  CO: --  CI: --  PA: --  PA(mean): --  PCWP: --  SVR: --  SVRI: --  PVR: --  PVRI: --    ECMO SETTINGS:  Type:		[ ] Venovenous		[ ] Venoarterial  Cannulation Site(s):     Flow:  	        RPM: 		    Oxygenator:	Sweep:     L/min	FiO2:        LABS:                        9.0    16.97 )-----------( 169      ( 12 Mar 2025 04:15 )             31.9     03-12    146[H]  |  112[H]  |  17  ----------------------------<  140[H]  3.9   |  26  |  0.71    Ca    8.2[L]      12 Mar 2025 04:15  Phos  3.0     03-12  Mg     1.90     03-12    TPro  5.9[L]  /  Alb  2.7[L]  /  TBili  1.6[H]  /  DBili  x   /  AST  35  /  ALT  56[H]  /  AlkPhos  68  03-12    PT/INR - ( 12 Mar 2025 04:15 )   PT: 25.4 sec;   INR: 2.21 ratio         PTT - ( 12 Mar 2025 04:15 )  PTT:53.8 sec  ABG - ( 11 Mar 2025 11:05 )  pH, Arterial: 7.47  pH, Blood: x     /  pCO2: 43    /  pO2: 74    / HCO3: 31    / Base Excess: 6.9   /  SaO2: 96.6                  Urinalysis Basic - ( 12 Mar 2025 05:00 )    Color: Dark Yellow / Appearance: Cloudy / S.021 / pH: x  Gluc: x / Ketone: Trace mg/dL  / Bili: Small / Urobili: 4.0 mg/dL   Blood: x / Protein: 30 mg/dL / Nitrite: Negative   Leuk Esterase: Trace / RBC: 3 /HPF / WBC 4 /HPF   Sq Epi: x / Non Sq Epi: 0 /HPF / Bacteria: Few /HPF          CAPILLARY BLOOD GLUCOSE      POCT Blood Glucose.: 144 mg/dL (12 Mar 2025 06:45)  POCT Blood Glucose.: 132 mg/dL (12 Mar 2025 00:48)  POCT Blood Glucose.: 133 mg/dL (11 Mar 2025 18:23)  POCT Blood Glucose.: 114 mg/dL (11 Mar 2025 13:07)      Culture - Sputum (collected 10 Mar 2025 13:00)  Source: Trach Asp Tracheal Aspirate  Gram Stain (10 Mar 2025 23:26):    Few polymorphonuclear leukocytes per low power field    No Squamous epithelial cells per low power field    No organisms seen per oil power field  Preliminary Report (11 Mar 2025 13:51):    No growth to date        RADIOLOGY & ADDITIONAL TESTS:  Other Labs  Imaging Personally Reviewed: No interval imaging  Electrocardiogram Personally Reviewed: No interval imaging    Medications and Allergies:   MEDICATIONS  (STANDING):  albuterol/ipratropium for Nebulization 3 milliLiter(s) Nebulizer every 6 hours  amLODIPine   Tablet 10 milliGRAM(s) Oral daily  argatroban Infusion 2 MICROgram(s)/kG/Min (24.1 mL/Hr) IV Continuous <Continuous>  chlorhexidine 0.12% Liquid 15 milliLiter(s) Oral Mucosa every 12 hours  chlorhexidine 2% Cloths 1 Application(s) Topical <User Schedule>  cloNIDine 0.2 milliGRAM(s) Oral every 8 hours  clotrimazole 1% Cream 1 Application(s) Topical two times a day  dexMEDEtomidine Infusion 0.2 MICROgram(s)/kG/Hr (12 mL/Hr) IV Continuous <Continuous>  dextrose 50% Injectable 25 Gram(s) IV Push once  dextrose 50% Injectable 12.5 Gram(s) IV Push once  dextrose 50% Injectable 25 Gram(s) IV Push once  furosemide    Tablet 40 milliGRAM(s) Oral every 12 hours  glucagon  Injectable 1 milliGRAM(s) IntraMuscular once  insulin lispro (ADMELOG) corrective regimen sliding scale   SubCutaneous every 6 hours  lisinopril 5 milliGRAM(s) Oral every 24 hours  multivitamin/minerals/iron Oral Solution (CENTRUM) 15 milliLiter(s) Oral daily  nystatin    Suspension 111485 Unit(s) Oral every 6 hours  piperacillin/tazobactam IVPB.. 3.375 Gram(s) IV Intermittent every 8 hours  polyethylene glycol 3350 17 Gram(s) Oral daily  QUEtiapine 25 milliGRAM(s) Oral at bedtime  senna Syrup 5 milliLiter(s) Oral at bedtime  sodium chloride 3%  Inhalation 4 milliLiter(s) Inhalation every 6 hours    MEDICATIONS  (PRN):      Antimicrobials  nystatin    Suspension 518116 Unit(s) Oral every 6 hours  piperacillin/tazobactam IVPB.. 3.375 Gram(s) IV Intermittent every 8 hours, Stop order after: 7 Days    piperacillin/tazobactam IVPB. 3.375 Gram(s) IV Intermittent once, 25 @ 13:50, STAT, Stop order after: 1 Doses  piperacillin/tazobactam IVPB.- 3.375 Gram(s) IV Intermittent once, 25 @ 18:00, 18:00  piperacillin/tazobactam IVPB.- 3.375 Gram(s) IV Intermittent once, 25 @ 00:00, 00:00  vancomycin  IVPB 1250 milliGRAM(s) IV Intermittent once, 25 @ 13:08, STAT, Stop order after: 1 Doses    linezolid  IVPB 600 milliGRAM(s) IV Intermittent every 12 hours, 25 @ 02:19,   piperacillin/tazobactam IVPB.. 4.5 Gram(s) IV Intermittent every 8 hours, 25 @ 14:00, 14:00, Stop order after: 14 Days  piperacillin/tazobactam IVPB.. 3.375 Gram(s) IV Intermittent every 8 hours, 25 @ 14:18, , Stop order after: 7 Days  piperacillin/tazobactam IVPB.. 3.375 Gram(s) IV Intermittent every 8 hours, 25 @ 14:16, , Stop order after: 7 Days  piperacillin/tazobactam IVPB.. 4.5 Gram(s) IV Intermittent every 12 hours, 25 @ 02:18, Routine, Stop order after: 7 Days  piperacillin/tazobactam IVPB.. 4.5 Gram(s) IV Intermittent every 8 hours, 25 @ 09:26, Routine, Stop order after: 7 Days  piperacillin/tazobactam IVPB.. 4.5 Gram(s) IV Intermittent every 8 hours, 25 @ 09:47, Routine, Stop order after: 5 Days  vancomycin  IVPB    , 25 @ 10:53,   vancomycin  IVPB    , 25 @ 13:27,   vancomycin  IVPB 1250 milliGRAM(s) IV Intermittent every 8 hours, 25 @ 22:00, , Stop order after: 7 Days    nystatin    Suspension 340926 Unit(s) Oral every 6 hours  piperacillin/tazobactam IVPB.. 3.375 Gram(s) IV Intermittent every 8 hours, Stop order after: 7 Days      Allergies    No Known Allergies    Intolerances     MICU Progress Note    SUBJECTIVE  INTERVAL EVENTS:  - Febrile overnight  - with leg pain overnight    OBJECTIVE  Physical Exam:   PHYSICAL EXAM:  GENERAL: No acute distress, well-developed  HEAD:  Atraumatic, Normocephalic  EYES: EOMI, PERRLA, conjunctiva and sclera clear  NECK: Supple, no lymphadenopathy, no JVD  CHEST/LUNG: consolidations b/l   HEART: Regular rate and rhythm. Normal S1/S2. No murmurs, rubs, or gallops  ABDOMEN: Soft, non-tender, non-distended; normal bowel sounds, no organomegaly  EXTREMITIES:  2+ peripheral pulses b/l, No clubbing, cyanosis, or edema  NEUROLOGY: A&O x 3, no focal deficits  SKIN: No rashes or lesions    ICU Vital Signs Last 24 Hrs  T(F): 100.8 (12 Mar 2025 00:00), Max: 101.7 (11 Mar 2025 17:00)  HR: 133 (12 Mar 2025 03:01) (120 - 149)  BP: 133/74 (12 Mar 2025 02:00) (114/58 - 150/83)  BP(mean): 91 (12 Mar 2025 02:00) (74 - 99)  ABP: 183/88 (11 Mar 2025 17:00) (165/80 - 198/85)  ABP(mean): 114 (11 Mar 2025 17:00) (99 - 123)  RR: 35 (12 Mar 2025 02:00) (26 - 47)  SpO2: 100% (12 Mar 2025 03:01) (98% - 100%)    I&O's Summary    11 Mar 2025 07:01  -  12 Mar 2025 07:00  --------------------------------------------------------  IN: 1904.4 mL / OUT: 18743 mL / NET: -8495.6 mL      Mode: standby    ECMO Day#     Adult Advanced Hemodynamics Last 24 Hrs  CVP(mm Hg): --  CVP(cm H2O): --  CO: --  CI: --  PA: --  PA(mean): --  PCWP: --  SVR: --  SVRI: --  PVR: --  PVRI: --    ECMO SETTINGS:  Type:		[ ] Venovenous		[ ] Venoarterial  Cannulation Site(s):     Flow:  	        RPM: 		    Oxygenator:	Sweep:     L/min	FiO2:        LABS:                        9.0    16.97 )-----------( 169      ( 12 Mar 2025 04:15 )             31.9     03-12    146[H]  |  112[H]  |  17  ----------------------------<  140[H]  3.9   |  26  |  0.71    Ca    8.2[L]      12 Mar 2025 04:15  Phos  3.0     03-12  Mg     1.90     03-12    TPro  5.9[L]  /  Alb  2.7[L]  /  TBili  1.6[H]  /  DBili  x   /  AST  35  /  ALT  56[H]  /  AlkPhos  68  03-12    PT/INR - ( 12 Mar 2025 04:15 )   PT: 25.4 sec;   INR: 2.21 ratio         PTT - ( 12 Mar 2025 04:15 )  PTT:53.8 sec  ABG - ( 11 Mar 2025 11:05 )  pH, Arterial: 7.47  pH, Blood: x     /  pCO2: 43    /  pO2: 74    / HCO3: 31    / Base Excess: 6.9   /  SaO2: 96.6                  Urinalysis Basic - ( 12 Mar 2025 05:00 )    Color: Dark Yellow / Appearance: Cloudy / S.021 / pH: x  Gluc: x / Ketone: Trace mg/dL  / Bili: Small / Urobili: 4.0 mg/dL   Blood: x / Protein: 30 mg/dL / Nitrite: Negative   Leuk Esterase: Trace / RBC: 3 /HPF / WBC 4 /HPF   Sq Epi: x / Non Sq Epi: 0 /HPF / Bacteria: Few /HPF          CAPILLARY BLOOD GLUCOSE      POCT Blood Glucose.: 144 mg/dL (12 Mar 2025 06:45)  POCT Blood Glucose.: 132 mg/dL (12 Mar 2025 00:48)  POCT Blood Glucose.: 133 mg/dL (11 Mar 2025 18:23)  POCT Blood Glucose.: 114 mg/dL (11 Mar 2025 13:07)      Culture - Sputum (collected 10 Mar 2025 13:00)  Source: Trach Asp Tracheal Aspirate  Gram Stain (10 Mar 2025 23:26):    Few polymorphonuclear leukocytes per low power field    No Squamous epithelial cells per low power field    No organisms seen per oil power field  Preliminary Report (11 Mar 2025 13:51):    No growth to date        RADIOLOGY & ADDITIONAL TESTS:  Other Labs  Imaging Personally Reviewed: No interval imaging  Electrocardiogram Personally Reviewed: No interval imaging    Medications and Allergies:   MEDICATIONS  (STANDING):  albuterol/ipratropium for Nebulization 3 milliLiter(s) Nebulizer every 6 hours  amLODIPine   Tablet 10 milliGRAM(s) Oral daily  argatroban Infusion 2 MICROgram(s)/kG/Min (24.1 mL/Hr) IV Continuous <Continuous>  chlorhexidine 0.12% Liquid 15 milliLiter(s) Oral Mucosa every 12 hours  chlorhexidine 2% Cloths 1 Application(s) Topical <User Schedule>  cloNIDine 0.2 milliGRAM(s) Oral every 8 hours  clotrimazole 1% Cream 1 Application(s) Topical two times a day  dexMEDEtomidine Infusion 0.2 MICROgram(s)/kG/Hr (12 mL/Hr) IV Continuous <Continuous>  dextrose 50% Injectable 25 Gram(s) IV Push once  dextrose 50% Injectable 12.5 Gram(s) IV Push once  dextrose 50% Injectable 25 Gram(s) IV Push once  furosemide    Tablet 40 milliGRAM(s) Oral every 12 hours  glucagon  Injectable 1 milliGRAM(s) IntraMuscular once  insulin lispro (ADMELOG) corrective regimen sliding scale   SubCutaneous every 6 hours  lisinopril 5 milliGRAM(s) Oral every 24 hours  multivitamin/minerals/iron Oral Solution (CENTRUM) 15 milliLiter(s) Oral daily  nystatin    Suspension 005612 Unit(s) Oral every 6 hours  piperacillin/tazobactam IVPB.. 3.375 Gram(s) IV Intermittent every 8 hours  polyethylene glycol 3350 17 Gram(s) Oral daily  QUEtiapine 25 milliGRAM(s) Oral at bedtime  senna Syrup 5 milliLiter(s) Oral at bedtime  sodium chloride 3%  Inhalation 4 milliLiter(s) Inhalation every 6 hours    MEDICATIONS  (PRN):      Antimicrobials  nystatin    Suspension 909680 Unit(s) Oral every 6 hours  piperacillin/tazobactam IVPB.. 3.375 Gram(s) IV Intermittent every 8 hours, Stop order after: 7 Days    piperacillin/tazobactam IVPB. 3.375 Gram(s) IV Intermittent once, 25 @ 13:50, STAT, Stop order after: 1 Doses  piperacillin/tazobactam IVPB.- 3.375 Gram(s) IV Intermittent once, 25 @ 18:00, 18:00  piperacillin/tazobactam IVPB.- 3.375 Gram(s) IV Intermittent once, 25 @ 00:00, 00:00  vancomycin  IVPB 1250 milliGRAM(s) IV Intermittent once, 25 @ 13:08, STAT, Stop order after: 1 Doses    linezolid  IVPB 600 milliGRAM(s) IV Intermittent every 12 hours, 25 @ 02:19,   piperacillin/tazobactam IVPB.. 4.5 Gram(s) IV Intermittent every 8 hours, 25 @ 14:00, 14:00, Stop order after: 14 Days  piperacillin/tazobactam IVPB.. 3.375 Gram(s) IV Intermittent every 8 hours, 25 @ 14:18, , Stop order after: 7 Days  piperacillin/tazobactam IVPB.. 3.375 Gram(s) IV Intermittent every 8 hours, 25 @ 14:16, , Stop order after: 7 Days  piperacillin/tazobactam IVPB.. 4.5 Gram(s) IV Intermittent every 12 hours, 25 @ 02:18, Routine, Stop order after: 7 Days  piperacillin/tazobactam IVPB.. 4.5 Gram(s) IV Intermittent every 8 hours, 25 @ 09:26, Routine, Stop order after: 7 Days  piperacillin/tazobactam IVPB.. 4.5 Gram(s) IV Intermittent every 8 hours, 25 @ 09:47, Routine, Stop order after: 5 Days  vancomycin  IVPB    , 25 @ 10:53,   vancomycin  IVPB    , 25 @ 13:27,   vancomycin  IVPB 1250 milliGRAM(s) IV Intermittent every 8 hours, 25 @ 22:00, , Stop order after: 7 Days    nystatin    Suspension 432207 Unit(s) Oral every 6 hours  piperacillin/tazobactam IVPB.. 3.375 Gram(s) IV Intermittent every 8 hours, Stop order after: 7 Days      Allergies    No Known Allergies    Intolerances     MICU Progress Note    SUBJECTIVE  INTERVAL EVENTS:  - Febrile overnight  - with leg pain overnight    OBJECTIVE  Physical Exam:   PHYSICAL EXAM:  GENERAL: No acute distress, large body habitus   HEAD:  Atraumatic, Normocephalic  EYES: EOMI, PERRLA, conjunctiva and sclera clear  NECK: Supple, no lymphadenopathy, no JVD  CHEST/LUNG: consolidations b/l; trachea in place without bleeding or discharge   HEART: Tachycardic. Normal S1/S2. No murmurs, rubs, or gallops  ABDOMEN: Soft, non-tender, non-distended; normal bowel sounds, no organomegaly  EXTREMITIES:  2+ peripheral pulses b/l, No clubbing, cyanosis, or edema  NEUROLOGY: A&O x 3, no focal deficits, able to speak with valve   SKIN: No rashes or lesions    ICU Vital Signs Last 24 Hrs  T(F): 100.8 (12 Mar 2025 00:00), Max: 101.7 (11 Mar 2025 17:00)  HR: 133 (12 Mar 2025 03:01) (120 - 149)  BP: 133/74 (12 Mar 2025 02:00) (114/58 - 150/83)  BP(mean): 91 (12 Mar 2025 02:00) (74 - 99)  ABP: 183/88 (11 Mar 2025 17:00) (165/80 - 198/85)  ABP(mean): 114 (11 Mar 2025 17:00) (99 - 123)  RR: 35 (12 Mar 2025 02:00) (26 - 47)  SpO2: 100% (12 Mar 2025 03:01) (98% - 100%)    I&O's Summary    11 Mar 2025 07:01  -  12 Mar 2025 07:00  --------------------------------------------------------  IN: 1904.4 mL / OUT: 87805 mL / NET: -8495.6 mL      Mode: standby    ECMO Day#     Adult Advanced Hemodynamics Last 24 Hrs  CVP(mm Hg): --  CVP(cm H2O): --  CO: --  CI: --  PA: --  PA(mean): --  PCWP: --  SVR: --  SVRI: --  PVR: --  PVRI: --    ECMO SETTINGS:  Type:		[ ] Venovenous		[ ] Venoarterial  Cannulation Site(s):     Flow:  	        RPM: 		    Oxygenator:	Sweep:     L/min	FiO2:        LABS:                        9.0    16.97 )-----------( 169      ( 12 Mar 2025 04:15 )             31.9     03-12    146[H]  |  112[H]  |  17  ----------------------------<  140[H]  3.9   |  26  |  0.71    Ca    8.2[L]      12 Mar 2025 04:15  Phos  3.0     03-12  Mg     1.90     03-12    TPro  5.9[L]  /  Alb  2.7[L]  /  TBili  1.6[H]  /  DBili  x   /  AST  35  /  ALT  56[H]  /  AlkPhos  68  03-12    PT/INR - ( 12 Mar 2025 04:15 )   PT: 25.4 sec;   INR: 2.21 ratio         PTT - ( 12 Mar 2025 04:15 )  PTT:53.8 sec  ABG - ( 11 Mar 2025 11:05 )  pH, Arterial: 7.47  pH, Blood: x     /  pCO2: 43    /  pO2: 74    / HCO3: 31    / Base Excess: 6.9   /  SaO2: 96.6                  Urinalysis Basic - ( 12 Mar 2025 05:00 )    Color: Dark Yellow / Appearance: Cloudy / S.021 / pH: x  Gluc: x / Ketone: Trace mg/dL  / Bili: Small / Urobili: 4.0 mg/dL   Blood: x / Protein: 30 mg/dL / Nitrite: Negative   Leuk Esterase: Trace / RBC: 3 /HPF / WBC 4 /HPF   Sq Epi: x / Non Sq Epi: 0 /HPF / Bacteria: Few /HPF          CAPILLARY BLOOD GLUCOSE      POCT Blood Glucose.: 144 mg/dL (12 Mar 2025 06:45)  POCT Blood Glucose.: 132 mg/dL (12 Mar 2025 00:48)  POCT Blood Glucose.: 133 mg/dL (11 Mar 2025 18:23)  POCT Blood Glucose.: 114 mg/dL (11 Mar 2025 13:07)      Culture - Sputum (collected 10 Mar 2025 13:00)  Source: Trach Asp Tracheal Aspirate  Gram Stain (10 Mar 2025 23:26):    Few polymorphonuclear leukocytes per low power field    No Squamous epithelial cells per low power field    No organisms seen per oil power field  Preliminary Report (11 Mar 2025 13:51):    No growth to date        RADIOLOGY & ADDITIONAL TESTS:  Other Labs  Imaging Personally Reviewed: No interval imaging  Electrocardiogram Personally Reviewed: No interval imaging    Medications and Allergies:   MEDICATIONS  (STANDING):  albuterol/ipratropium for Nebulization 3 milliLiter(s) Nebulizer every 6 hours  amLODIPine   Tablet 10 milliGRAM(s) Oral daily  argatroban Infusion 2 MICROgram(s)/kG/Min (24.1 mL/Hr) IV Continuous <Continuous>  chlorhexidine 0.12% Liquid 15 milliLiter(s) Oral Mucosa every 12 hours  chlorhexidine 2% Cloths 1 Application(s) Topical <User Schedule>  cloNIDine 0.2 milliGRAM(s) Oral every 8 hours  clotrimazole 1% Cream 1 Application(s) Topical two times a day  dexMEDEtomidine Infusion 0.2 MICROgram(s)/kG/Hr (12 mL/Hr) IV Continuous <Continuous>  dextrose 50% Injectable 25 Gram(s) IV Push once  dextrose 50% Injectable 12.5 Gram(s) IV Push once  dextrose 50% Injectable 25 Gram(s) IV Push once  furosemide    Tablet 40 milliGRAM(s) Oral every 12 hours  glucagon  Injectable 1 milliGRAM(s) IntraMuscular once  insulin lispro (ADMELOG) corrective regimen sliding scale   SubCutaneous every 6 hours  lisinopril 5 milliGRAM(s) Oral every 24 hours  multivitamin/minerals/iron Oral Solution (CENTRUM) 15 milliLiter(s) Oral daily  nystatin    Suspension 074047 Unit(s) Oral every 6 hours  piperacillin/tazobactam IVPB.. 3.375 Gram(s) IV Intermittent every 8 hours  polyethylene glycol 3350 17 Gram(s) Oral daily  QUEtiapine 25 milliGRAM(s) Oral at bedtime  senna Syrup 5 milliLiter(s) Oral at bedtime  sodium chloride 3%  Inhalation 4 milliLiter(s) Inhalation every 6 hours    MEDICATIONS  (PRN):      Antimicrobials  nystatin    Suspension 111639 Unit(s) Oral every 6 hours  piperacillin/tazobactam IVPB.. 3.375 Gram(s) IV Intermittent every 8 hours, Stop order after: 7 Days    piperacillin/tazobactam IVPB. 3.375 Gram(s) IV Intermittent once, 25 @ 13:50, STAT, Stop order after: 1 Doses  piperacillin/tazobactam IVPB.- 3.375 Gram(s) IV Intermittent once, 25 @ 18:00, 18:00  piperacillin/tazobactam IVPB.- 3.375 Gram(s) IV Intermittent once, 25 @ 00:00, 00:00  vancomycin  IVPB 1250 milliGRAM(s) IV Intermittent once, 25 @ 13:08, STAT, Stop order after: 1 Doses    linezolid  IVPB 600 milliGRAM(s) IV Intermittent every 12 hours, 25 @ 02:19,   piperacillin/tazobactam IVPB.. 4.5 Gram(s) IV Intermittent every 8 hours, 25 @ 14:00, 14:00, Stop order after: 14 Days  piperacillin/tazobactam IVPB.. 3.375 Gram(s) IV Intermittent every 8 hours, 25 @ 14:18, , Stop order after: 7 Days  piperacillin/tazobactam IVPB.. 3.375 Gram(s) IV Intermittent every 8 hours, 25 @ 14:16, , Stop order after: 7 Days  piperacillin/tazobactam IVPB.. 4.5 Gram(s) IV Intermittent every 12 hours, 25 @ 02:18, Routine, Stop order after: 7 Days  piperacillin/tazobactam IVPB.. 4.5 Gram(s) IV Intermittent every 8 hours, 25 @ 09:26, Routine, Stop order after: 7 Days  piperacillin/tazobactam IVPB.. 4.5 Gram(s) IV Intermittent every 8 hours, 25 @ 09:47, Routine, Stop order after: 5 Days  vancomycin  IVPB    , 25 @ 10:53,   vancomycin  IVPB    , 25 @ 13:27,   vancomycin  IVPB 1250 milliGRAM(s) IV Intermittent every 8 hours, 25 @ 22:00, , Stop order after: 7 Days    nystatin    Suspension 202560 Unit(s) Oral every 6 hours  piperacillin/tazobactam IVPB.. 3.375 Gram(s) IV Intermittent every 8 hours, Stop order after: 7 Days      Allergies    No Known Allergies    Intolerances

## 2025-03-12 NOTE — CHART NOTE - NSCHARTNOTEFT_GEN_A_CORE
39M with obesity, pAfib (no AC), HTN, BEA (on CPAP), history of b/l papilledema attributed to pseudotumor cerebri, admitted initially to [facility] for AHRF in s/o MFPNA c/b ARDS with persistent hypoxemia requiring transfer to [facility] for VV ECMO. Completed ABX for pneumonia with suspected abdominal pannus cellulitis and malodorous bronchial secretions. Found with RV failure s/p diuresis. Eventually S/P Trach/PEG now decannulated from ECMO (3/7). ABX restarted given uptrending leuocytosis. Weaned off sedation. Tolerating TC ATC since 3/11. Being transferred from MICU today.    Sign out received from MICU this afternoon. Patient seen and examined at bedside upon arrival to unit.     Full Pulmonology Progress Note to follow tomorrow. Refer to MICU Transfer and Progress notes for further details regarding course and care plan.

## 2025-03-13 LAB
ADD ON TEST-SPECIMEN IN LAB: SIGNIFICANT CHANGE UP
ALBUMIN SERPL ELPH-MCNC: 2.8 G/DL — LOW (ref 3.3–5)
ALP SERPL-CCNC: 64 U/L — SIGNIFICANT CHANGE UP (ref 40–120)
ALT FLD-CCNC: 41 U/L — SIGNIFICANT CHANGE UP (ref 4–41)
ANION GAP SERPL CALC-SCNC: 8 MMOL/L — SIGNIFICANT CHANGE UP (ref 7–14)
APTT BLD: 44.9 SEC — HIGH (ref 24.5–35.6)
AST SERPL-CCNC: 25 U/L — SIGNIFICANT CHANGE UP (ref 4–40)
B PERT DNA SPEC QL NAA+PROBE: SIGNIFICANT CHANGE UP
B PERT+PARAPERT DNA PNL SPEC NAA+PROBE: SIGNIFICANT CHANGE UP
BASOPHILS # BLD AUTO: 0.02 K/UL — SIGNIFICANT CHANGE UP (ref 0–0.2)
BASOPHILS NFR BLD AUTO: 0.1 % — SIGNIFICANT CHANGE UP (ref 0–2)
BILIRUB SERPL-MCNC: 0.9 MG/DL — SIGNIFICANT CHANGE UP (ref 0.2–1.2)
BUN SERPL-MCNC: 16 MG/DL — SIGNIFICANT CHANGE UP (ref 7–23)
C PNEUM DNA SPEC QL NAA+PROBE: SIGNIFICANT CHANGE UP
CALCIUM SERPL-MCNC: 8.3 MG/DL — LOW (ref 8.4–10.5)
CHLORIDE SERPL-SCNC: 110 MMOL/L — HIGH (ref 98–107)
CO2 SERPL-SCNC: 27 MMOL/L — SIGNIFICANT CHANGE UP (ref 22–31)
CREAT SERPL-MCNC: 0.69 MG/DL — SIGNIFICANT CHANGE UP (ref 0.5–1.3)
CULTURE RESULTS: SIGNIFICANT CHANGE UP
EGFR: 122 ML/MIN/1.73M2 — SIGNIFICANT CHANGE UP
EGFR: 122 ML/MIN/1.73M2 — SIGNIFICANT CHANGE UP
EOSINOPHIL # BLD AUTO: 0.32 K/UL — SIGNIFICANT CHANGE UP (ref 0–0.5)
EOSINOPHIL NFR BLD AUTO: 1.8 % — SIGNIFICANT CHANGE UP (ref 0–6)
FLUAV SUBTYP SPEC NAA+PROBE: SIGNIFICANT CHANGE UP
FLUBV RNA SPEC QL NAA+PROBE: SIGNIFICANT CHANGE UP
GLUCOSE BLDC GLUCOMTR-MCNC: 105 MG/DL — HIGH (ref 70–99)
GLUCOSE BLDC GLUCOMTR-MCNC: 106 MG/DL — HIGH (ref 70–99)
GLUCOSE BLDC GLUCOMTR-MCNC: 124 MG/DL — HIGH (ref 70–99)
GLUCOSE BLDC GLUCOMTR-MCNC: 125 MG/DL — HIGH (ref 70–99)
GLUCOSE BLDC GLUCOMTR-MCNC: 137 MG/DL — HIGH (ref 70–99)
GLUCOSE SERPL-MCNC: 127 MG/DL — HIGH (ref 70–99)
HADV DNA SPEC QL NAA+PROBE: SIGNIFICANT CHANGE UP
HCOV 229E RNA SPEC QL NAA+PROBE: SIGNIFICANT CHANGE UP
HCOV HKU1 RNA SPEC QL NAA+PROBE: SIGNIFICANT CHANGE UP
HCOV NL63 RNA SPEC QL NAA+PROBE: SIGNIFICANT CHANGE UP
HCOV OC43 RNA SPEC QL NAA+PROBE: SIGNIFICANT CHANGE UP
HCT VFR BLD CALC: 30.7 % — LOW (ref 39–50)
HGB BLD-MCNC: 8.5 G/DL — LOW (ref 13–17)
HMPV RNA SPEC QL NAA+PROBE: SIGNIFICANT CHANGE UP
HPIV1 RNA SPEC QL NAA+PROBE: SIGNIFICANT CHANGE UP
HPIV2 RNA SPEC QL NAA+PROBE: SIGNIFICANT CHANGE UP
HPIV3 RNA SPEC QL NAA+PROBE: SIGNIFICANT CHANGE UP
HPIV4 RNA SPEC QL NAA+PROBE: SIGNIFICANT CHANGE UP
IANC: 13.69 K/UL — HIGH (ref 1.8–7.4)
IMM GRANULOCYTES NFR BLD AUTO: 0.9 % — SIGNIFICANT CHANGE UP (ref 0–0.9)
INR BLD: 1.96 RATIO — HIGH (ref 0.85–1.16)
LYMPHOCYTES # BLD AUTO: 1.69 K/UL — SIGNIFICANT CHANGE UP (ref 1–3.3)
LYMPHOCYTES # BLD AUTO: 9.8 % — LOW (ref 13–44)
M PNEUMO DNA SPEC QL NAA+PROBE: SIGNIFICANT CHANGE UP
MAGNESIUM SERPL-MCNC: 1.8 MG/DL — SIGNIFICANT CHANGE UP (ref 1.6–2.6)
MCHC RBC-ENTMCNC: 23.7 PG — LOW (ref 27–34)
MCHC RBC-ENTMCNC: 27.7 G/DL — LOW (ref 32–36)
MCV RBC AUTO: 85.8 FL — SIGNIFICANT CHANGE UP (ref 80–100)
MONOCYTES # BLD AUTO: 1.42 K/UL — HIGH (ref 0–0.9)
MONOCYTES NFR BLD AUTO: 8.2 % — SIGNIFICANT CHANGE UP (ref 2–14)
NEUTROPHILS # BLD AUTO: 13.69 K/UL — HIGH (ref 1.8–7.4)
NEUTROPHILS NFR BLD AUTO: 79.2 % — HIGH (ref 43–77)
NRBC # BLD AUTO: 0 K/UL — SIGNIFICANT CHANGE UP (ref 0–0)
NRBC # FLD: 0 K/UL — SIGNIFICANT CHANGE UP (ref 0–0)
NRBC BLD AUTO-RTO: 0 /100 WBCS — SIGNIFICANT CHANGE UP (ref 0–0)
PHOSPHATE SERPL-MCNC: 3.1 MG/DL — SIGNIFICANT CHANGE UP (ref 2.5–4.5)
PLATELET # BLD AUTO: 177 K/UL — SIGNIFICANT CHANGE UP (ref 150–400)
POTASSIUM SERPL-MCNC: 3.8 MMOL/L — SIGNIFICANT CHANGE UP (ref 3.5–5.3)
POTASSIUM SERPL-SCNC: 3.8 MMOL/L — SIGNIFICANT CHANGE UP (ref 3.5–5.3)
PROT SERPL-MCNC: 5.8 G/DL — LOW (ref 6–8.3)
PROTHROM AB SERPL-ACNC: 23.2 SEC — HIGH (ref 9.9–13.4)
RAPID RVP RESULT: SIGNIFICANT CHANGE UP
RBC # BLD: 3.58 M/UL — LOW (ref 4.2–5.8)
RBC # FLD: 23.1 % — HIGH (ref 10.3–14.5)
RSV RNA SPEC QL NAA+PROBE: SIGNIFICANT CHANGE UP
RV+EV RNA SPEC QL NAA+PROBE: SIGNIFICANT CHANGE UP
SARS-COV-2 RNA SPEC QL NAA+PROBE: SIGNIFICANT CHANGE UP
SODIUM SERPL-SCNC: 145 MMOL/L — SIGNIFICANT CHANGE UP (ref 135–145)
SPECIMEN SOURCE: SIGNIFICANT CHANGE UP
WBC # BLD: 17.3 K/UL — HIGH (ref 3.8–10.5)
WBC # FLD AUTO: 17.3 K/UL — HIGH (ref 3.8–10.5)

## 2025-03-13 PROCEDURE — 99233 SBSQ HOSP IP/OBS HIGH 50: CPT

## 2025-03-13 RX ORDER — FUROSEMIDE 10 MG/ML
40 INJECTION INTRAMUSCULAR; INTRAVENOUS
Refills: 0 | Status: DISCONTINUED | OUTPATIENT
Start: 2025-03-13 | End: 2025-03-15

## 2025-03-13 RX ORDER — ACETAMINOPHEN 500 MG/5ML
650 LIQUID (ML) ORAL EVERY 6 HOURS
Refills: 0 | Status: DISCONTINUED | OUTPATIENT
Start: 2025-03-13 | End: 2025-03-24

## 2025-03-13 RX ORDER — GABAPENTIN 400 MG/1
100 CAPSULE ORAL THREE TIMES A DAY
Refills: 0 | Status: DISCONTINUED | OUTPATIENT
Start: 2025-03-13 | End: 2025-03-16

## 2025-03-13 RX ORDER — ACETAMINOPHEN 500 MG/5ML
1000 LIQUID (ML) ORAL ONCE
Refills: 0 | Status: DISCONTINUED | OUTPATIENT
Start: 2025-03-13 | End: 2025-03-13

## 2025-03-13 RX ORDER — MAGNESIUM SULFATE 500 MG/ML
1 SYRINGE (ML) INJECTION ONCE
Refills: 0 | Status: COMPLETED | OUTPATIENT
Start: 2025-03-13 | End: 2025-03-14

## 2025-03-13 RX ORDER — KETOROLAC TROMETHAMINE 30 MG/ML
15 INJECTION, SOLUTION INTRAMUSCULAR; INTRAVENOUS ONCE
Refills: 0 | Status: DISCONTINUED | OUTPATIENT
Start: 2025-03-13 | End: 2025-03-13

## 2025-03-13 RX ADMIN — Medication 650 MILLIGRAM(S): at 22:26

## 2025-03-13 RX ADMIN — Medication 4 MILLILITER(S): at 03:43

## 2025-03-13 RX ADMIN — Medication 0.2 MILLIGRAM(S): at 06:19

## 2025-03-13 RX ADMIN — GABAPENTIN 100 MILLIGRAM(S): 400 CAPSULE ORAL at 17:41

## 2025-03-13 RX ADMIN — Medication 0.2 MILLIGRAM(S): at 22:29

## 2025-03-13 RX ADMIN — Medication 4 MILLILITER(S): at 09:27

## 2025-03-13 RX ADMIN — ARGATROBAN 24.1 MICROGRAM(S)/KG/MIN: 100 INJECTION, SOLUTION INTRAVENOUS at 06:19

## 2025-03-13 RX ADMIN — CLOTRIMAZOLE 1 APPLICATION(S): 1 CREAM TOPICAL at 17:41

## 2025-03-13 RX ADMIN — Medication 15 MILLILITER(S): at 11:23

## 2025-03-13 RX ADMIN — Medication 166.67 MILLIGRAM(S): at 02:52

## 2025-03-13 RX ADMIN — IPRATROPIUM BROMIDE AND ALBUTEROL SULFATE 3 MILLILITER(S): .5; 2.5 SOLUTION RESPIRATORY (INHALATION) at 03:44

## 2025-03-13 RX ADMIN — IPRATROPIUM BROMIDE AND ALBUTEROL SULFATE 3 MILLILITER(S): .5; 2.5 SOLUTION RESPIRATORY (INHALATION) at 21:36

## 2025-03-13 RX ADMIN — Medication 500000 UNIT(S): at 02:47

## 2025-03-13 RX ADMIN — IPRATROPIUM BROMIDE AND ALBUTEROL SULFATE 3 MILLILITER(S): .5; 2.5 SOLUTION RESPIRATORY (INHALATION) at 15:07

## 2025-03-13 RX ADMIN — QUETIAPINE FUMARATE 25 MILLIGRAM(S): 25 TABLET ORAL at 22:29

## 2025-03-13 RX ADMIN — Medication 0.2 MILLIGRAM(S): at 14:58

## 2025-03-13 RX ADMIN — Medication 2 MILLIGRAM(S): at 01:23

## 2025-03-13 RX ADMIN — Medication 500000 UNIT(S): at 17:41

## 2025-03-13 RX ADMIN — Medication 650 MILLIGRAM(S): at 11:22

## 2025-03-13 RX ADMIN — Medication 25 GRAM(S): at 06:25

## 2025-03-13 RX ADMIN — GABAPENTIN 100 MILLIGRAM(S): 400 CAPSULE ORAL at 22:27

## 2025-03-13 RX ADMIN — Medication 4 MILLILITER(S): at 21:36

## 2025-03-13 RX ADMIN — AMLODIPINE BESYLATE 10 MILLIGRAM(S): 10 TABLET ORAL at 06:18

## 2025-03-13 RX ADMIN — ARGATROBAN 24.1 MICROGRAM(S)/KG/MIN: 100 INJECTION, SOLUTION INTRAVENOUS at 22:25

## 2025-03-13 RX ADMIN — FUROSEMIDE 40 MILLIGRAM(S): 10 INJECTION INTRAMUSCULAR; INTRAVENOUS at 14:59

## 2025-03-13 RX ADMIN — Medication 1 APPLICATION(S): at 06:04

## 2025-03-13 RX ADMIN — CLOTRIMAZOLE 1 APPLICATION(S): 1 CREAM TOPICAL at 06:04

## 2025-03-13 RX ADMIN — ARGATROBAN 24.1 MICROGRAM(S)/KG/MIN: 100 INJECTION, SOLUTION INTRAVENOUS at 19:15

## 2025-03-13 RX ADMIN — LISINOPRIL 5 MILLIGRAM(S): 5 TABLET ORAL at 11:23

## 2025-03-13 RX ADMIN — Medication 500000 UNIT(S): at 06:20

## 2025-03-13 RX ADMIN — ARGATROBAN 24.1 MICROGRAM(S)/KG/MIN: 100 INJECTION, SOLUTION INTRAVENOUS at 07:33

## 2025-03-13 RX ADMIN — IPRATROPIUM BROMIDE AND ALBUTEROL SULFATE 3 MILLILITER(S): .5; 2.5 SOLUTION RESPIRATORY (INHALATION) at 09:27

## 2025-03-13 RX ADMIN — KETOROLAC TROMETHAMINE 15 MILLIGRAM(S): 30 INJECTION, SOLUTION INTRAMUSCULAR; INTRAVENOUS at 02:51

## 2025-03-13 RX ADMIN — Medication 25 GRAM(S): at 23:33

## 2025-03-13 RX ADMIN — FUROSEMIDE 40 MILLIGRAM(S): 10 INJECTION INTRAMUSCULAR; INTRAVENOUS at 06:18

## 2025-03-13 RX ADMIN — Medication 25 GRAM(S): at 16:39

## 2025-03-13 RX ADMIN — Medication 500000 UNIT(S): at 11:22

## 2025-03-13 NOTE — SWALLOW BEDSIDE ASSESSMENT ADULT - ADDITIONAL RECOMMENDATIONS
Medical team advised to reconsult this service as patient continues to become medically optimized. This service to follow to clinically re-assess candidacy for objective assessment as schedule permits.

## 2025-03-13 NOTE — PROGRESS NOTE ADULT - SUBJECTIVE AND OBJECTIVE BOX
CHIEF COMPLAINT: Patient is a 39y old  Male who presents with a chief complaint of sob (02 Mar 2025 06:19)      INTERVAL EVENTS:   - transferred to RCU from MICU yesterday  - overnight febrile and given Tylenol and Toradol  - continued on Argatroban drip, continued on IV Vanco and Zosyn     ROS: Seen by bedside during AM rounds     OBJECTIVE:  ICU Vital Signs Last 24 Hrs  T(C): 37.8 (12 Mar 2025 18:12), Max: 38.3 (12 Mar 2025 15:00)  T(F): 100 (12 Mar 2025 18:12), Max: 101 (12 Mar 2025 15:00)  HR: 98 (13 Mar 2025 04:07) (98 - 138)  BP: 121/68 (12 Mar 2025 18:12) (120/57 - 150/89)  BP(mean): 77 (12 Mar 2025 15:00) (77 - 107)  ABP: --  ABP(mean): --  RR: 32 (12 Mar 2025 18:12) (23 - 40)  SpO2: 100% (13 Mar 2025 04:07) (100% - 100%)    O2 Parameters below as of 13 Mar 2025 04:07  Patient On (Oxygen Delivery Method): tracheostomy collar          Mode: [facility]    03-12 @ 07:01  -  03-13 @ 07:00  --------------------------------------------------------  IN: 1602 mL / OUT: 551 mL / NET: 1051 mL      CAPILLARY BLOOD GLUCOSE      POCT Blood Glucose.: 124 mg/dL (13 Mar 2025 06:05)      PHYSICAL EXAM:  General:   HEENT:   Lymph Nodes:  Neck:   Respiratory:   Cardiovascular:   Abdomen:   Extremities:   Skin:   Neurological:  Psychiatry:    Mode: [facility] MEDICATIONS:  MEDICATIONS  (STANDING):  albuterol/ipratropium for Nebulization 3 milliLiter(s) Nebulizer every 6 hours  amLODIPine   Tablet 10 milliGRAM(s) Oral daily  argatroban Infusion 2 MICROgram(s)/kG/Min (24.1 mL/Hr) IV Continuous <Continuous>  chlorhexidine 2% Cloths 1 Application(s) Topical <User Schedule>  cloNIDine 0.2 milliGRAM(s) Oral every 8 hours  clotrimazole 1% Cream 1 Application(s) Topical two times a day  dextrose 50% Injectable 25 Gram(s) IV Push once  dextrose 50% Injectable 12.5 Gram(s) IV Push once  dextrose 50% Injectable 25 Gram(s) IV Push once  furosemide    Tablet 40 milliGRAM(s) Oral daily  glucagon  Injectable 1 milliGRAM(s) IntraMuscular once  insulin lispro (ADMELOG) corrective regimen sliding scale   SubCutaneous every 6 hours  lisinopril 5 milliGRAM(s) Oral every 24 hours  magnesium sulfate  IVPB 1 Gram(s) IV Intermittent once  multivitamin/minerals/iron Oral Solution (CENTRUM) 15 milliLiter(s) Oral daily  nystatin    Suspension 143091 Unit(s) Oral every 6 hours  piperacillin/tazobactam IVPB.. 4.5 Gram(s) IV Intermittent every 8 hours  polyethylene glycol 3350 17 Gram(s) Oral daily  potassium chloride   Powder 40 milliEquivalent(s) Oral once  QUEtiapine 25 milliGRAM(s) Oral at bedtime  senna Syrup 5 milliLiter(s) Oral at bedtime  sodium chloride 3%  Inhalation 4 milliLiter(s) Inhalation every 6 hours  vancomycin  IVPB 1250 milliGRAM(s) IV Intermittent every 8 hours    MEDICATIONS  (PRN):      LABS:                        8.5    17.30 )-----------( 177      ( 13 Mar 2025 06:20 )             30.7     03-13    145  |  110[H]  |  16  ----------------------------<  127[H]  3.8   |  27  |  0.69    Ca    8.3[L]      13 Mar 2025 06:20  Phos  3.1     03-13  Mg     1.80     03-13    TPro  5.8[L]  /  Alb  2.8[L]  /  TBili  0.9  /  DBili  x   /  AST  25  /  ALT  41  /  AlkPhos  64  03-13    PT/INR - ( 13 Mar 2025 06:20 )   PT: 23.2 sec;   INR: 1.96 ratio         PTT - ( 13 Mar 2025 06:20 )  PTT:44.9 sec  Urinalysis Basic - ( 13 Mar 2025 06:20 )    Color: x / Appearance: x / SG: x / pH: x  Gluc: 127 mg/dL / Ketone: x  / Bili: x / Urobili: x   Blood: x / Protein: x / Nitrite: x   Leuk Esterase: x / RBC: x / WBC x   Sq Epi: x / Non Sq Epi: x / Bacteria: x      Arterial Blood Gas:  03-11 @ 11:05  7.47/43/74/31/96.6/6.9  ABG lactate: --     CHIEF COMPLAINT: Patient is a 39y old  Male who presents with a chief complaint of sob (02 Mar 2025 06:19)      INTERVAL EVENTS:   - transferred to RCU from MICU yesterday  - overnight febrile and given Tylenol and Toradol  - continued on Argatroban drip, continued on IV Vanco and Zosyn     ROS: Seen by bedside during AM rounds, endorses pain in b/l feet, +frustration - endorses wanting to be moved to chair and drink     OBJECTIVE:  ICU Vital Signs Last 24 Hrs  T(C): 37.8 (12 Mar 2025 18:12), Max: 38.3 (12 Mar 2025 15:00)  T(F): 100 (12 Mar 2025 18:12), Max: 101 (12 Mar 2025 15:00)  HR: 98 (13 Mar 2025 04:07) (98 - 138)  BP: 121/68 (12 Mar 2025 18:12) (120/57 - 150/89)  BP(mean): 77 (12 Mar 2025 15:00) (77 - 107)  ABP: --  ABP(mean): --  RR: 32 (12 Mar 2025 18:12) (23 - 40)  SpO2: 100% (13 Mar 2025 04:07) (100% - 100%)    O2 Parameters below as of 13 Mar 2025 04:07  Patient On (Oxygen Delivery Method): tracheostomy collar          Mode: standby    03-12 @ 07:01  -  03-13 @ 07:00  --------------------------------------------------------  IN: 1602 mL / OUT: 551 mL / NET: 1051 mL      CAPILLARY BLOOD GLUCOSE      POCT Blood Glucose.: 124 mg/dL (13 Mar 2025 06:05)      PHYSICAL EXAM:  General: NAD  Neck: neck supple, no JVD, trach midline  Respiratory: +on trach collar with PMV valve, +diminished breath sounds at bases b/l, no rales or wheezing, normal respiratory effort  Cardiovascular: +sinus tachycardia  Abdomen: soft, non tender, +PEG  Extremities: c/d/i dressings over b/l feet  Skin: warm, dry  Neurological: AAO x 3, awake and alert, communicating with PMV valve, no gross focal deficits  Psychiatry: no agitation, normal affect and behavior     Mode: [facility] MEDICATIONS:  MEDICATIONS  (STANDING):  albuterol/ipratropium for Nebulization 3 milliLiter(s) Nebulizer every 6 hours  amLODIPine   Tablet 10 milliGRAM(s) Oral daily  argatroban Infusion 2 MICROgram(s)/kG/Min (24.1 mL/Hr) IV Continuous <Continuous>  chlorhexidine 2% Cloths 1 Application(s) Topical <User Schedule>  cloNIDine 0.2 milliGRAM(s) Oral every 8 hours  clotrimazole 1% Cream 1 Application(s) Topical two times a day  dextrose 50% Injectable 25 Gram(s) IV Push once  dextrose 50% Injectable 12.5 Gram(s) IV Push once  dextrose 50% Injectable 25 Gram(s) IV Push once  furosemide    Tablet 40 milliGRAM(s) Oral daily  glucagon  Injectable 1 milliGRAM(s) IntraMuscular once  insulin lispro (ADMELOG) corrective regimen sliding scale   SubCutaneous every 6 hours  lisinopril 5 milliGRAM(s) Oral every 24 hours  magnesium sulfate  IVPB 1 Gram(s) IV Intermittent once  multivitamin/minerals/iron Oral Solution (CENTRUM) 15 milliLiter(s) Oral daily  nystatin    Suspension 029179 Unit(s) Oral every 6 hours  piperacillin/tazobactam IVPB.. 4.5 Gram(s) IV Intermittent every 8 hours  polyethylene glycol 3350 17 Gram(s) Oral daily  potassium chloride   Powder 40 milliEquivalent(s) Oral once  QUEtiapine 25 milliGRAM(s) Oral at bedtime  senna Syrup 5 milliLiter(s) Oral at bedtime  sodium chloride 3%  Inhalation 4 milliLiter(s) Inhalation every 6 hours  vancomycin  IVPB 1250 milliGRAM(s) IV Intermittent every 8 hours    MEDICATIONS  (PRN):      LABS:                        8.5    17.30 )-----------( 177      ( 13 Mar 2025 06:20 )             30.7     03-13    145  |  110[H]  |  16  ----------------------------<  127[H]  3.8   |  27  |  0.69    Ca    8.3[L]      13 Mar 2025 06:20  Phos  3.1     03-13  Mg     1.80     03-13    TPro  5.8[L]  /  Alb  2.8[L]  /  TBili  0.9  /  DBili  x   /  AST  25  /  ALT  41  /  AlkPhos  64  03-13    PT/INR - ( 13 Mar 2025 06:20 )   PT: 23.2 sec;   INR: 1.96 ratio         PTT - ( 13 Mar 2025 06:20 )  PTT:44.9 sec  Urinalysis Basic - ( 13 Mar 2025 06:20 )    Color: x / Appearance: x / SG: x / pH: x  Gluc: 127 mg/dL / Ketone: x  / Bili: x / Urobili: x   Blood: x / Protein: x / Nitrite: x   Leuk Esterase: x / RBC: x / WBC x   Sq Epi: x / Non Sq Epi: x / Bacteria: x      Arterial Blood Gas:  03-11 @ 11:05  7.47/43/74/31/96.6/6.9  ABG lactate: --     CHIEF COMPLAINT: Patient is a 39y old  Male who presents with a chief complaint of sob (02 Mar 2025 06:19)      INTERVAL EVENTS:   - transferred to RCU from MICU yesterday  - overnight febrile and given Tylenol and Toradol  - continued on Argatroban drip  - rpt MRSA neg and Vanco dc'd, continued on Zosyn     ROS: Seen by bedside during AM rounds, endorses pain in b/l feet, +frustration - endorses wanting to be moved to chair and drink     OBJECTIVE:  ICU Vital Signs Last 24 Hrs  T(C): 37.8 (12 Mar 2025 18:12), Max: 38.3 (12 Mar 2025 15:00)  T(F): 100 (12 Mar 2025 18:12), Max: 101 (12 Mar 2025 15:00)  HR: 98 (13 Mar 2025 04:07) (98 - 138)  BP: 121/68 (12 Mar 2025 18:12) (120/57 - 150/89)  BP(mean): 77 (12 Mar 2025 15:00) (77 - 107)  ABP: --  ABP(mean): --  RR: 32 (12 Mar 2025 18:12) (23 - 40)  SpO2: 100% (13 Mar 2025 04:07) (100% - 100%)    O2 Parameters below as of 13 Mar 2025 04:07  Patient On (Oxygen Delivery Method): tracheostomy collar          Mode: standby    03-12 @ 07:01  -  03-13 @ 07:00  --------------------------------------------------------  IN: 1602 mL / OUT: 551 mL / NET: 1051 mL      CAPILLARY BLOOD GLUCOSE      POCT Blood Glucose.: 124 mg/dL (13 Mar 2025 06:05)      PHYSICAL EXAM:  General: NAD  Neck: neck supple, no JVD, trach midline  Respiratory: +on trach collar with PMV valve, +diminished breath sounds at bases b/l, no rales or wheezing, normal respiratory effort  Cardiovascular: +sinus tachycardia  Abdomen: soft, non tender, +PEG  Extremities: c/d/i dressings over b/l feet  Skin: warm, dry  Neurological: AAO x 3, awake and alert, communicating with PMV valve, no gross focal deficits  Psychiatry: no agitation, normal affect and behavior     Mode: [facility] MEDICATIONS:  MEDICATIONS  (STANDING):  albuterol/ipratropium for Nebulization 3 milliLiter(s) Nebulizer every 6 hours  amLODIPine   Tablet 10 milliGRAM(s) Oral daily  argatroban Infusion 2 MICROgram(s)/kG/Min (24.1 mL/Hr) IV Continuous <Continuous>  chlorhexidine 2% Cloths 1 Application(s) Topical <User Schedule>  cloNIDine 0.2 milliGRAM(s) Oral every 8 hours  clotrimazole 1% Cream 1 Application(s) Topical two times a day  dextrose 50% Injectable 25 Gram(s) IV Push once  dextrose 50% Injectable 12.5 Gram(s) IV Push once  dextrose 50% Injectable 25 Gram(s) IV Push once  furosemide    Tablet 40 milliGRAM(s) Oral daily  glucagon  Injectable 1 milliGRAM(s) IntraMuscular once  insulin lispro (ADMELOG) corrective regimen sliding scale   SubCutaneous every 6 hours  lisinopril 5 milliGRAM(s) Oral every 24 hours  magnesium sulfate  IVPB 1 Gram(s) IV Intermittent once  multivitamin/minerals/iron Oral Solution (CENTRUM) 15 milliLiter(s) Oral daily  nystatin    Suspension 647788 Unit(s) Oral every 6 hours  piperacillin/tazobactam IVPB.. 4.5 Gram(s) IV Intermittent every 8 hours  polyethylene glycol 3350 17 Gram(s) Oral daily  potassium chloride   Powder 40 milliEquivalent(s) Oral once  QUEtiapine 25 milliGRAM(s) Oral at bedtime  senna Syrup 5 milliLiter(s) Oral at bedtime  sodium chloride 3%  Inhalation 4 milliLiter(s) Inhalation every 6 hours  vancomycin  IVPB 1250 milliGRAM(s) IV Intermittent every 8 hours    MEDICATIONS  (PRN):      LABS:                        8.5    17.30 )-----------( 177      ( 13 Mar 2025 06:20 )             30.7     03-13    145  |  110[H]  |  16  ----------------------------<  127[H]  3.8   |  27  |  0.69    Ca    8.3[L]      13 Mar 2025 06:20  Phos  3.1     03-13  Mg     1.80     03-13    TPro  5.8[L]  /  Alb  2.8[L]  /  TBili  0.9  /  DBili  x   /  AST  25  /  ALT  41  /  AlkPhos  64  03-13    PT/INR - ( 13 Mar 2025 06:20 )   PT: 23.2 sec;   INR: 1.96 ratio         PTT - ( 13 Mar 2025 06:20 )  PTT:44.9 sec  Urinalysis Basic - ( 13 Mar 2025 06:20 )    Color: x / Appearance: x / SG: x / pH: x  Gluc: 127 mg/dL / Ketone: x  / Bili: x / Urobili: x   Blood: x / Protein: x / Nitrite: x   Leuk Esterase: x / RBC: x / WBC x   Sq Epi: x / Non Sq Epi: x / Bacteria: x      Arterial Blood Gas:  03-11 @ 11:05  7.47/43/74/31/96.6/6.9  ABG lactate: --

## 2025-03-13 NOTE — SWALLOW BEDSIDE ASSESSMENT ADULT - CONSISTENCIES ADMINISTERED
~3 ounces moderately-thick apple juice impregnated with green food color dye./moderately thick ~3 ounces applesauce impregnated with green food color dye./pureed

## 2025-03-13 NOTE — PROGRESS NOTE ADULT - NS ATTEND AMEND GEN_ALL_CORE FT
40 yo M w/ class III obesity, pAfib (no AC), HTN, BEA (on CPAP), history of b/l papilledema attributed to pseudotumor cerebri, who is transferred from [place] on 2/26 for VV ECMO 2/2 ARDS.     Patient transferred to [facility] for VV ECMO. S/P diiuresis, TTE/ROBERT with RV failure, s/p treatment of pneumonia. Now s/p trach and peg. Patient decannulated last Friday. Now currently on TC trials.     # acute hypoxemic respiratory failure  # RV failure  # oropharyngeal dysphagia  # Critical illness myopathy  # afib  - ARDS s/p VV ecmo. Currently trach dependent, C/W nebs/airway clearance.   - OOB, ambulate as tolerated, aggressive PT  - C/W oral diuresis. Will need repeat TTE  - Failed S?S eval. Will consider repeat each as he becomes stronger.   - C/W TC trials. will consider downsizing when able but currently a fresh trach  - C/W tube feeds via peg  - Fevers- repeat dopplars tomorrow, complete course of abx. D/C vanc. C/W zosyn. Maybe related to ECMO. F/U with cultures.   - C/W full a/c for now. Hx of afib, [id number] of 2. Will d/w patient risk and benefits of ongoing a/c  - DVT ppx- Full A/C  - Dispo- full code.

## 2025-03-13 NOTE — SWALLOW BEDSIDE ASSESSMENT ADULT - H & P REVIEW
Progress Note-Pulm 3/13: "38 yo M w/ class III obesity, pAfib (no AC), HTN, BEA (on CPAP), history of b/l papilledema attributed to pseudotumor cerebri, who is transferred from [place] on 2/26 for VV ECMO 2/2 ARDS. Patient initially presented to [place] on 2/24 for SOB and b/l LE edema. As per chart review, patient endorses some degree of SOB and b/l LE edema x 3 months with significant weight gain. As per ICU team, patient had positive sick contacts with viral URI 1 to 2 weeks PTA and since then has had worsening SOB. Patient found to be reportedly hypoxemic to 70% on RA with worsening respiratory status/secretions and somnolence in the ED. Patient was a difficult intubation (~6 attempts). Despite optimal vent management, patient remained hypoxemic. Unable to prone due to obesity. Patient cannulated for VV ECMO on 2/25 and transferred to [facility] for further management. Patient now decannulated and s/p trach/PEG placed 3/7. Currently improving, alert and oriented weaning sedation. Transferred to RCU on 3/12; INTERVAL EVENTS:  - transferred to RCU from MICU yesterday - overnight febrile and given Tylenol and Toradol - continued on Argatroban drip -rpt MRSA neg and Vanco dc'd, continued on Zosyn -ROS: Seen by bedside during AM rounds, endorses pain in b/l feet, +frustration - endorses wanting to be moved to chair and drink."

## 2025-03-13 NOTE — SWALLOW BEDSIDE ASSESSMENT ADULT - ASR SWALLOW RECOMMEND DIAG
2. Objective testing not indicated at this time, given immediate tracheal suctioning completed with return of trace-mild green-tinged secretions, suggestive of Gross Aspiration.

## 2025-03-13 NOTE — SWALLOW BEDSIDE ASSESSMENT ADULT - COMMENTS
CXR 3/7: "IMPRESSION: Tracheostomy tube in position."    Patient received upright in bed and awake/alert. Shiley 6XLT Cuffed Tracheostomy in place (Cuff Deflated). Patient on Trach Collar with SPO2 98-97%. Alert & oriented x3-4. Father present at bedside and for duration of Green Dye Assessment. Patient with hypophonic vocal quality observed upon voicing attempts, however able to make basic wants/needs known and follow verbal directives for participation. Endorsed consuming Regular Solids with Thin Liquids without difficulties prior to hospitalization and reported wanting, "ice chips." Following verbal education, patient agreeable to participation in PO trials for purpose of Green Dye Assessment.

## 2025-03-13 NOTE — SWALLOW BEDSIDE ASSESSMENT ADULT - ASR SWALLOW REFERRAL
5. Recommend RD consult/follow-up to ensure maintenance of adequate nutrition/hydration & provide ongoing management of tube feeds.

## 2025-03-13 NOTE — SWALLOW BEDSIDE ASSESSMENT ADULT - SWALLOW EVAL: DIAGNOSIS
Green-Dye Swallow Assessment completed to subjectively assess for Gross Aspiration. Patient accepted 3 ounces of Puree and 3 ounces of Moderately-Thick Liquids impregnated with green food color dye. Oral stage for puree and moderately-thick liquids characterized by adequate oral acceptance/containment, slow bolus manipulation, slow anterior-posterior transport, and reduced oral clearance with trace lingual surface stasis post primary-swallow (puree > moderately-thick liquids); Pharyngeal stage characterized by suspected delay in initiation of the pharyngeal swallow trigger and +hyolaryngal excursion upon digital palpation. No overt s/s airway penetration/aspiration evidenced following PO intake of puree and moderately-thick liquid trials. Patient denied globus/stuck sensation with all trialed consistencies; RN present and provided immediate tracheal suctioning with return of trace-mild green-tinged secretions, suggestive of Gross Aspiration.

## 2025-03-13 NOTE — PROGRESS NOTE ADULT - ASSESSMENT
40 yo M w/ class III obesity, pAfib (no AC), HTN, BEA (on CPAP), history of b/l papilledema attributed to pseudotumor cerebri, who is transferred from [place] on 2/26 for VV ECMO 2/2 ARDS. Patient initially presented to [facility] on 2/24 for SOB and b/l LE edema. As per chart review, patient endorses some degree of SOB and b/l LE edema x 3 months with significant weight gain. As per ICU team, patient had positive sick contacts with viral URI 1 to 2 weeks PTA and since then has had worsening SOB. Patient found to be reportedly hypoxemic to 70% on RA with worsening respiratory status/secretions and somnolence in the ED. Patient was a difficult intubation (~6 attempts). Despite optimal vent management, patient remained hypoxemic. Unable to prone due to obesity. Patient cannulated for VV ECMO on 2/25 and transferred to [facility] for further management. Patient now decannulated and s/p trach/PEG placed 3/7. Currently improving, alert and oriented weaning sedation. Transferred to RCU on 3/12.     Neuro  #Mental status   #pseudotumor cerebri  hx of pseudotumor cerebri s/p LP under fluoro in 2024 with high opening pressure with MRI 2024 also showed possible pituitary mass but unclear because of pressure effect from pseudotumor cerebri causing partially empty sella. Considering to start acetazolamide outpatient but never started  Prolactin high (30), defer checking cortisol axis given already received steroids but should be investigated, ACTH (<1.5) low but should be suppressed iso steroids  Concern for pituitary adenoma, however TSH .79 normal,  fT4 0.9 normal, and low T3 67  - Weaned off precedex, on clonidine 0.2mg q8hrs iso HTN and tachycardia  - currently on Seroquel 25mg QHS    - PT/OT following    Cardiovascular  #HTN   #Atrial fibrillation   Hx of a. fib not on AC has not had any episodes during admission   TTE from 2024 did not demonstrate elevation of pulmonary pressures, but now with severe PH suggesting chronicity on TTE at    TTE on 2/25 showed LV EF 61%, enlarged RV with TAPSE 2.1cm, ePASP at least 49 (Patient had 10/2024 TTE with normal LV and RV with ePASP 14).  ROBERT 3/1 showing mild TR, enlarged RV with normal LV/RV function. PEEP increased to 24 without signs of RV dilation  troponins initially elevated, peaked at 162 however downtrended likely from RV dilation and strain - EKG neg for any typical ischemia  repeat TTE 3/11  Normal LV, EF 66 %, RV not well visualized. enlarged right ventricular cavity size, reduced right ventricular systolic function.  (TAPSE) is 1.6 cm. No evidence of pulmonary hypertension.    - amlodipine 10mg daily + lisinopril 5mg daily  - ECG daily to monitor QTc  as has been on higher end but will watch closely on Seroquel         Pulmonary  #Pulmonary HTN  #ARDS  #Multifocal Pneumonia   #BEA  #Trach-dependent   History of diagnosed BEA/?OHS intermittent compliance to home PAP  c/f acute on suspected chronic pulmonary hypertension in setting of possible BEA/OHS c/b pneumonia, ARDS, and pulmonary edema requiring difficult intubation on 2/25 and VVecmo 2/25-3/7. Now s/p tracheostomy 3/7 with weaning trials.   CTA chest on 2/24 negative for pulmonary embolism, notable for patchy bilateral lower lobe opacities, hepatomegaly, mild ascites, edema/induration of the abdominal pannus.  Full RVP and BAL cultures negative    s/p Dexamethasone followed DEXA/ARDS protocol (20mg x5 days then 10mg x5 days)    - C/w duoNeb q6 hours + 3% saline   - c/w speaking valve   - S/p Flex bronchoscopy tracheostomy #6 portex proximal XLT, placed 3/7 by CT surgery in OR, CTsx to remove trach sutures -> 3/17  - CTsx to remove ECMO sutures ~10 days post decannulation ->  3/17      Gastrointestinal  #Diet - w/ peg   #Transaminitis - resolving  #constipation-resolved   s/p Endoscopic percutaneous gastrostomy LUQ PEG 3/7   tolerated tube feeds via PEG    elevated Tbili likely iso hemolysis 2/2 high ECMO flows, mild transaminitis now improving   hepatomegaly and mild ascites noted on CT A/P - no pocket to tap  RUQ ultrasound with steatosis      - c/w bowel regimen with Senna and Miralax flexiseal in place   - nutrition following       Gu/Renal  #ELLA  #Hypernatremia   #Non-AG Metabolic Alkalosis  ELLA - improved, likely ATN/vascular congestion  baires removed 3/10, passed TOV    - Free water flushes 350ml q4 hr for hypernatremia    - Lasix 40mg PO qD   - monitor urine and electrolytes closely        Infectious disease  #leukocytosis  #Pneumonia   #cellulitis   #febrile   Leukocytosis, remains febrile post decannulation but c/o increased sputum  Repeat and follow up UA, sputum and blood cultures   Recently completed course of zosyn (2/26-3/9) to cover cellulitis/pneumonia  s/p cefepime, linezolid,  aztihro 2/25-2/26 and  vanco (2/26-3/1)   full RVP negativestrep pneumo Ag and urine legionella negative   s/p Mupiricon b/l nares for +MSSA  (2/26-3/3)  BAL cultures all with NGTD  ?penile meatus yellow discharge resolved, GC/chamydia and HIV negative     - start vanc, can d/c if repeat mrsa swab negative  - c/w zosyn; if mrsa negative switch to cefepime   - candidal intertrigo +/- cellulitis of the pannus - clotrimazole cream 1% BID 2/26 -+ abx as per above      Hematology/DVT ppx  #Anticoagulation   #Thrombocytopenia  #Anemia   Hb stable, thrombocytopenia likely r/t shearing 2/2 ECMO circuit now improved off circuit   4T score low probability of HIT, received heparin at OSH, PF4 neg, SHAWANDA negative   LE duplex 2/25 neg    - c/w argatroban with goal 50-70 pending official [facility] duplex post decannulation   - VA duplex to upper and lower ext negative for DVT 3/9 will continue argatroban until repeat VA duplex in 7 days 3/17    Endocrine  #DM  A1c 6.7; obesity with metabolic syndrome  diabetes with steroid induced hyperglycemia, mild - ISS  s/p DEXA/ARDS dexamethasone protocol   pituitary workup largely unremarkable, steroids as above given recent weight gain but likely large component of volume    - c/w FS Q6h and low ISS and adjust if needed now off steroids     SKin/Lines  #Access  #Cellulitis   #DTI with skin sloughing  #Blisters  - L subclavian TLC inserted 2/25-3/8  - L radial artery line 2/25-3/11  - Right IJ and Right Fem ECMO cannulas 2/25-3/7  - candidal intertrigo +/- cellulitis of the pannus - clotrimazole cream 1% BID 2/26 -+ abx as per above  - DTI to right buttocks (sloughing of skin) and B/L feet Blisters    - podiatry following consulted for  b/l foot blisters- recs vascular consult and on 3/4 lanced bilateral foot serous blister with mild serous drainage now with wound care and f/u out patient   - Vascular consulted b/l blisters likely pressure related, recommending z flow boots for offloading  - No arterial disease and b/l 3 vessel runoff on VA duplex performed 3/5. No indication for further invasive or non-invasive studies    GOC   - full code   - palliative involved was following while on ECMO   - Mother, father involved in care are next of kin/surrogates and patient's girlfriend/partner is also involved but defers to parents 38 yo M w/ class III obesity, pAfib (no AC), HTN, BEA (on CPAP), history of b/l papilledema attributed to pseudotumor cerebri, who is transferred from [place] on 2/26 for VV ECMO 2/2 ARDS. Patient initially presented to [place] on 2/24 for SOB and b/l LE edema. As per chart review, patient endorses some degree of SOB and b/l LE edema x 3 months with significant weight gain. As per ICU team, patient had positive sick contacts with viral URI 1 to 2 weeks PTA and since then has had worsening SOB. Patient found to be reportedly hypoxemic to 70% on RA with worsening respiratory status/secretions and somnolence in the ED. Patient was a difficult intubation (~6 attempts). Despite optimal vent management, patient remained hypoxemic. Unable to prone due to obesity. Patient cannulated for VV ECMO on 2/25 and transferred to [facility] for further management. Patient now decannulated and s/p trach/PEG placed 3/7. Currently improving, alert and oriented weaning sedation. Transferred to RCU on 3/12.     Neuro  # Mental status   # pseudotumor cerebri  hx of pseudotumor cerebri s/p LP under fluoro in 2024 with high opening pressure with MRI 2024 also showed possible pituitary mass but unclear because of pressure effect from pseudotumor cerebri causing partially empty sella. Considering to start acetazolamide outpatient but never started  Prolactin high (30), defer checking cortisol axis given already received steroids but should be investigated, ACTH (<1.5) low but should be suppressed iso steroids  Concern for pituitary adenoma, however TSH .79 normal,  fT4 0.9 normal, and low T3 67  - Weaned off precedex, on clonidine 0.2mg q8hrs iso HTN and tachycardia  - currently on Seroquel 25mg QHS    - PT/OT following    Cardiovascular  # HTN   # Atrial fibrillation   Hx of a. fib not on AC has not had any episodes during admission   TTE from 2024 did not demonstrate elevation of pulmonary pressures, but now with severe PH suggesting chronicity on TTE at    TTE on 2/25 showed LV EF 61%, enlarged RV with TAPSE 2.1cm, ePASP at least 49 (Patient had 10/2024 TTE with normal LV and RV with ePASP 14).  ROBERT 3/1 showing mild TR, enlarged RV with normal LV/RV function. PEEP increased to 24 without signs of RV dilation  troponins initially elevated, peaked at 162 however downtrended likely from RV dilation and strain - EKG neg for any typical ischemia  repeat TTE 3/11  Normal LV, EF 66 %, RV not well visualized. enlarged right ventricular cavity size, reduced right ventricular systolic function.  (TAPSE) is 1.6 cm. No evidence of pulmonary hypertension.    - amlodipine 10mg daily + lisinopril 5mg daily  - ECG daily to monitor QTc  as has been on higher end but will watch closely on Seroquel         Pulmonary  #Pulmonary HTN  #ARDS  #Multifocal Pneumonia   #BEA  #Trach-dependent   History of diagnosed BEA/?OHS intermittent compliance to home PAP  c/f acute on suspected chronic pulmonary hypertension in setting of possible BEA/OHS c/b pneumonia, ARDS, and pulmonary edema requiring difficult intubation on 2/25 and VVecmo 2/25-3/7. Now s/p tracheostomy 3/7 with weaning trials.   CTA chest on 2/24 negative for pulmonary embolism, notable for patchy bilateral lower lobe opacities, hepatomegaly, mild ascites, edema/induration of the abdominal pannus.  Full RVP and BAL cultures negative    s/p Dexamethasone followed DEXA/ARDS protocol (20mg x5 days then 10mg x5 days)    - C/w duoNeb q6 hours + 3% saline   - c/w speaking valve   - S/p Flex bronchoscopy tracheostomy #6 portex proximal XLT, placed 3/7 by CT surgery in OR, CTsx to remove trach sutures -> 3/17  - CTsx to remove ECMO sutures ~10 days post decannulation ->  3/17      Gastrointestinal  #Diet - w/ peg   #Transaminitis - resolving  #constipation-resolved   s/p Endoscopic percutaneous gastrostomy LUQ PEG 3/7   tolerated tube feeds via PEG    elevated Tbili likely iso hemolysis 2/2 high ECMO flows, mild transaminitis now improving   hepatomegaly and mild ascites noted on CT A/P - no pocket to tap  RUQ ultrasound with steatosis      - c/w bowel regimen with Senna and Miralax flexiseal in place   - nutrition following   - SLP pending       Gu/Renal  #ELLA  #Hypernatremia   #Non-AG Metabolic Alkalosis  ELLA - improved, likely ATN/vascular congestion  baires removed 3/10, passed TOV    - Free water flushes 350ml q4 hr for hypernatremia    - Lasix 40mg PO qD   - monitor urine and electrolytes closely        Infectious disease  #leukocytosis  #Pneumonia   #cellulitis   #febrile   Leukocytosis, remains febrile post decannulation but c/o increased sputum  Repeat and follow up UA, sputum and blood cultures   Recently completed course of zosyn (2/26-3/9) to cover cellulitis/pneumonia  s/p cefepime, linezolid,  aztihro 2/25-2/26 and  vanco (2/26-3/1)   full RVP negativestrep pneumo Ag and urine legionella negative   s/p Mupiricon b/l nares for +MSSA  (2/26-3/3)  BAL cultures all with NGTD  ?penile meatus yellow discharge resolved, GC/chamydia and HIV negative     - rpt MRSA swab negative and Vancomycin discontinued   - c/w zosyn; if mrsa negative switch to cefepime [ ]   - candidal intertrigo +/- cellulitis of the pannus - clotrimazole cream 1% BID 2/26 -+ abx as per above      Hematology/DVT ppx  #Anticoagulation   #Thrombocytopenia  #Anemia   Hb stable, thrombocytopenia likely r/t shearing 2/2 ECMO circuit now improved off circuit   4T score low probability of HIT, received heparin at OSH, PF4 neg, SHAWANDA negative   LE duplex 2/25 neg    - c/w argatroban with goal 50-70 pending official VA duplex post decannulation   - VA duplex to upper and lower ext negative for DVT 3/9 will continue argatroban until repeat VA duplex in 7 days 3/17    Endocrine  #DM  A1c 6.7; obesity with metabolic syndrome  diabetes with steroid induced hyperglycemia, mild - ISS  s/p DEXA/ARDS dexamethasone protocol   pituitary workup largely unremarkable, steroids as above given recent weight gain but likely large component of volume    - c/w FS Q6h and low ISS and adjust if needed now off steroids     SKin/Lines  #Access  #Cellulitis   #DTI with skin sloughing  #Blisters  - L subclavian TLC inserted 2/25-3/8  - L radial artery line 2/25-3/11  - Right IJ and Right Fem ECMO cannulas 2/25-3/7  - candidal intertrigo +/- cellulitis of the pannus - clotrimazole cream 1% BID 2/26 -+ abx as per above  - DTI to right buttocks (sloughing of skin) and B/L feet Blisters    - podiatry following consulted for  b/l foot blisters- recs vascular consult and on 3/4 lanced bilateral foot serous blister with mild serous drainage now with wound care and f/u out patient   - Vascular consulted b/l blisters likely pressure related, recommending z flow boots for offloading  - No arterial disease and b/l 3 vessel runoff on VA duplex performed 3/5. No indication for further invasive or non-invasive studies    GOC   - full code   - palliative involved was following while on ECMO   - Mother, father involved in care are next of kin/surrogates and patient's girlfriend/partner is also involved but defers to parents 40 yo M w/ class III obesity, pAfib (no AC), HTN, BEA (on CPAP), history of b/l papilledema attributed to pseudotumor cerebri, who is transferred from [facility] on 2/26 for VV ECMO 2/2 ARDS. Patient initially presented to [facility] on 2/24 for SOB and b/l LE edema. As per chart review, patient endorses some degree of SOB and b/l LE edema x 3 months with significant weight gain. As per ICU team, patient had positive sick contacts with viral URI 1 to 2 weeks PTA and since then has had worsening SOB. Patient found to be reportedly hypoxemic to 70% on RA with worsening respiratory status/secretions and somnolence in the ED. Patient was a difficult intubation (~6 attempts). Despite optimal vent management, patient remained hypoxemic. Unable to prone due to obesity. Patient cannulated for VV ECMO on 2/25 and transferred to [facility] for further management. Patient now decannulated and s/p trach/PEG placed 3/7. Currently improving, alert and oriented weaning sedation. Transferred to RCU on 3/12.     Neuro  # Mental status   # pseudotumor cerebri  hx of pseudotumor cerebri s/p LP under fluoro in 2024 with high opening pressure with MRI 2024 also showed possible pituitary mass but unclear because of pressure effect from pseudotumor cerebri causing partially empty sella. Considering to start acetazolamide outpatient but never started  Prolactin high (30), defer checking cortisol axis given already received steroids but should be investigated, ACTH (<1.5) low but should be suppressed iso steroids  Concern for pituitary adenoma, however TSH .79 normal,  fT4 0.9 normal, and low T3 67  - Weaned off precedex, on clonidine 0.2mg q8hrs iso HTN and tachycardia  - currently on Seroquel 25mg QHS    - PT/OT following    Cardiovascular  # HTN   # Atrial fibrillation   Hx of a. fib not on AC has not had any episodes during admission   TTE from 2024 did not demonstrate elevation of pulmonary pressures, but now with severe PH suggesting chronicity on TTE at    TTE on 2/25 showed LV EF 61%, enlarged RV with TAPSE 2.1cm, ePASP at least 49 (Patient had 10/2024 TTE with normal LV and RV with ePASP 14).  ROBERT 3/1 showing mild TR, enlarged RV with normal LV/RV function. PEEP increased to 24 without signs of RV dilation  troponins initially elevated, peaked at 162 however downtrended likely from RV dilation and strain - EKG neg for any typical ischemia  repeat TTE 3/11  Normal LV, EF 66 %, RV not well visualized. enlarged right ventricular cavity size, reduced right ventricular systolic function.  (TAPSE) is 1.6 cm. No evidence of pulmonary hypertension.    - amlodipine 10mg daily + lisinopril 5mg daily  - ECG daily to monitor QTc  as has been on higher end but will watch closely on Seroquel  - Continued on Lasix 40 mg BID (increased on 3/13)         Pulmonary  #Pulmonary HTN  #ARDS  #Multifocal Pneumonia   #BEA  #Trach-dependent   History of diagnosed BEA/?OHS intermittent compliance to home PAP  c/f acute on suspected chronic pulmonary hypertension in setting of possible BEA/OHS c/b pneumonia, ARDS, and pulmonary edema requiring difficult intubation on 2/25 and VVecmo 2/25-3/7. Now s/p tracheostomy 3/7 with weaning trials.   CTA chest on 2/24 negative for pulmonary embolism, notable for patchy bilateral lower lobe opacities, hepatomegaly, mild ascites, edema/induration of the abdominal pannus.  Full RVP and BAL cultures negative    s/p Dexamethasone followed DEXA/ARDS protocol (20mg x5 days then 10mg x5 days)    - C/w duoNeb q6 hours + 3% saline   - c/w speaking valve   - S/p Flex bronchoscopy tracheostomy #6 portex proximal XLT, placed 3/7 by CT surgery in OR, CTsx to remove trach sutures -> 3/17  - CTsx to remove ECMO sutures ~10 days post decannulation ->  3/17      Gastrointestinal  #Diet - w/ peg   #Transaminitis - resolving  #constipation-resolved   s/p Endoscopic percutaneous gastrostomy LUQ PEG 3/7   tolerated tube feeds via PEG    elevated Tbili likely iso hemolysis 2/2 high ECMO flows, mild transaminitis now improving   hepatomegaly and mild ascites noted on CT A/P - no pocket to tap  RUQ ultrasound with steatosis      - c/w bowel regimen with Senna and Miralax flexiseal in place   - nutrition following   - SLP pending       Gu/Renal  #ELLA  #Hypernatremia   #Non-AG Metabolic Alkalosis  ELLA - improved, likely ATN/vascular congestion  baires removed 3/10, passed TOV    - Free water flushes 350ml q4 hr for hypernatremia    - Lasix 40mg PO qD   - monitor urine and electrolytes closely        Infectious disease  #leukocytosis  #Pneumonia   #cellulitis   #febrile   Leukocytosis, remains febrile post decannulation but c/o increased sputum  Repeat and follow up UA, sputum and blood cultures   Recently completed course of zosyn (2/26-3/9) to cover cellulitis/pneumonia  s/p cefepime, linezolid,  aztihro 2/25-2/26 and  vanco (2/26-3/1)   full RVP negativestrep pneumo Ag and urine legionella negative   s/p Mupiricon b/l nares for +MSSA  (2/26-3/3)  BAL cultures all with NGTD  ?penile meatus yellow discharge resolved, GC/chamydia and HIV negative     - rpt MRSA swab negative and Vancomycin discontinued   - c/w zosyn   - candidal intertrigo +/- cellulitis of the pannus - clotrimazole cream 1% BID 2/26 -+ abx as per above      Hematology/DVT ppx  #Anticoagulation   #Thrombocytopenia  #Anemia   Hb stable, thrombocytopenia likely r/t shearing 2/2 ECMO circuit now improved off circuit   4T score low probability of HIT, received heparin at OSH, PF4 neg, SHAWANDA negative   LE duplex 2/25 neg    - c/w argatroban with goal 50-70 pending official VA duplex post decannulation   - VA duplex to upper and lower ext negative for DVT 3/9 will continue argatroban   - repeat VA duplex on 3/14     Endocrine  #DM  A1c 6.7; obesity with metabolic syndrome  diabetes with steroid induced hyperglycemia, mild - ISS  s/p DEXA/ARDS dexamethasone protocol   pituitary workup largely unremarkable, steroids as above given recent weight gain but likely large component of volume    - c/w FS Q6h and low ISS and adjust if needed now off steroids     SKin/Lines  #Access  #Cellulitis   #DTI with skin sloughing  #Blisters  - L subclavian TLC inserted 2/25-3/8  - L radial artery line 2/25-3/11  - Right IJ and Right Fem ECMO cannulas 2/25-3/7  - candidal intertrigo +/- cellulitis of the pannus - clotrimazole cream 1% BID 2/26 -+ abx as per above  - DTI to right buttocks (sloughing of skin) and B/L feet Blisters    - podiatry following consulted for  b/l foot blisters- recs vascular consult and on 3/4 lanced bilateral foot serous blister with mild serous drainage now with wound care and f/u out patient   - Vascular consulted b/l blisters likely pressure related, recommending z flow boots for offloading  - No arterial disease and b/l 3 vessel runoff on VA duplex performed 3/5. No indication for further invasive or non-invasive studies    GOC   - full code   - palliative involved was following while on ECMO   - Mother, father involved in care are next of kin/surrogates and patient's girlfriend/partner is also involved but defers to parents

## 2025-03-14 ENCOUNTER — RESULT REVIEW (OUTPATIENT)
Age: 40
End: 2025-03-14

## 2025-03-14 LAB
ANION GAP SERPL CALC-SCNC: 9 MMOL/L — SIGNIFICANT CHANGE UP (ref 7–14)
APTT BLD: 49.3 SEC — HIGH (ref 24.5–35.6)
BASOPHILS # BLD AUTO: 0.01 K/UL — SIGNIFICANT CHANGE UP (ref 0–0.2)
BASOPHILS NFR BLD AUTO: 0.1 % — SIGNIFICANT CHANGE UP (ref 0–2)
BUN SERPL-MCNC: 13 MG/DL — SIGNIFICANT CHANGE UP (ref 7–23)
CALCIUM SERPL-MCNC: 8.3 MG/DL — LOW (ref 8.4–10.5)
CHLORIDE SERPL-SCNC: 106 MMOL/L — SIGNIFICANT CHANGE UP (ref 98–107)
CO2 SERPL-SCNC: 30 MMOL/L — SIGNIFICANT CHANGE UP (ref 22–31)
CREAT SERPL-MCNC: 0.63 MG/DL — SIGNIFICANT CHANGE UP (ref 0.5–1.3)
EGFR: 126 ML/MIN/1.73M2 — SIGNIFICANT CHANGE UP
EGFR: 126 ML/MIN/1.73M2 — SIGNIFICANT CHANGE UP
EOSINOPHIL # BLD AUTO: 0.26 K/UL — SIGNIFICANT CHANGE UP (ref 0–0.5)
EOSINOPHIL NFR BLD AUTO: 1.8 % — SIGNIFICANT CHANGE UP (ref 0–6)
GAS PNL BLDV: SIGNIFICANT CHANGE UP
GLUCOSE BLDC GLUCOMTR-MCNC: 106 MG/DL — HIGH (ref 70–99)
GLUCOSE BLDC GLUCOMTR-MCNC: 106 MG/DL — HIGH (ref 70–99)
GLUCOSE BLDC GLUCOMTR-MCNC: 109 MG/DL — HIGH (ref 70–99)
GLUCOSE BLDC GLUCOMTR-MCNC: 131 MG/DL — HIGH (ref 70–99)
GLUCOSE SERPL-MCNC: 107 MG/DL — HIGH (ref 70–99)
HCT VFR BLD CALC: 29.7 % — LOW (ref 39–50)
HGB BLD-MCNC: 8.3 G/DL — LOW (ref 13–17)
IANC: 11.41 K/UL — HIGH (ref 1.8–7.4)
IMM GRANULOCYTES NFR BLD AUTO: 0.6 % — SIGNIFICANT CHANGE UP (ref 0–0.9)
INR BLD: 1.72 RATIO — HIGH (ref 0.85–1.16)
LYMPHOCYTES # BLD AUTO: 1.35 K/UL — SIGNIFICANT CHANGE UP (ref 1–3.3)
LYMPHOCYTES # BLD AUTO: 9.5 % — LOW (ref 13–44)
MAGNESIUM SERPL-MCNC: 1.7 MG/DL — SIGNIFICANT CHANGE UP (ref 1.6–2.6)
MCHC RBC-ENTMCNC: 24.1 PG — LOW (ref 27–34)
MCHC RBC-ENTMCNC: 27.9 G/DL — LOW (ref 32–36)
MCV RBC AUTO: 86.1 FL — SIGNIFICANT CHANGE UP (ref 80–100)
MONOCYTES # BLD AUTO: 1.05 K/UL — HIGH (ref 0–0.9)
MONOCYTES NFR BLD AUTO: 7.4 % — SIGNIFICANT CHANGE UP (ref 2–14)
NEUTROPHILS # BLD AUTO: 11.41 K/UL — HIGH (ref 1.8–7.4)
NEUTROPHILS NFR BLD AUTO: 80.6 % — HIGH (ref 43–77)
NRBC # BLD AUTO: 0 K/UL — SIGNIFICANT CHANGE UP (ref 0–0)
NRBC # FLD: 0 K/UL — SIGNIFICANT CHANGE UP (ref 0–0)
NRBC BLD AUTO-RTO: 0 /100 WBCS — SIGNIFICANT CHANGE UP (ref 0–0)
PHOSPHATE SERPL-MCNC: 2.9 MG/DL — SIGNIFICANT CHANGE UP (ref 2.5–4.5)
PLATELET # BLD AUTO: 214 K/UL — SIGNIFICANT CHANGE UP (ref 150–400)
POTASSIUM SERPL-MCNC: 3.6 MMOL/L — SIGNIFICANT CHANGE UP (ref 3.5–5.3)
POTASSIUM SERPL-SCNC: 3.6 MMOL/L — SIGNIFICANT CHANGE UP (ref 3.5–5.3)
PROTHROM AB SERPL-ACNC: 19.8 SEC — HIGH (ref 9.9–13.4)
RBC # BLD: 3.45 M/UL — LOW (ref 4.2–5.8)
RBC # FLD: 23.3 % — HIGH (ref 10.3–14.5)
SODIUM SERPL-SCNC: 145 MMOL/L — SIGNIFICANT CHANGE UP (ref 135–145)
WBC # BLD: 14.17 K/UL — HIGH (ref 3.8–10.5)
WBC # FLD AUTO: 14.17 K/UL — HIGH (ref 3.8–10.5)

## 2025-03-14 PROCEDURE — 93970 EXTREMITY STUDY: CPT | Mod: 26

## 2025-03-14 PROCEDURE — 93971 EXTREMITY STUDY: CPT | Mod: 26,RT

## 2025-03-14 PROCEDURE — 99233 SBSQ HOSP IP/OBS HIGH 50: CPT

## 2025-03-14 RX ORDER — ACETAMINOPHEN 500 MG/5ML
1000 LIQUID (ML) ORAL ONCE
Refills: 0 | Status: COMPLETED | OUTPATIENT
Start: 2025-03-14 | End: 2025-03-14

## 2025-03-14 RX ORDER — QUETIAPINE FUMARATE 25 MG/1
12.5 TABLET ORAL AT BEDTIME
Refills: 0 | Status: DISCONTINUED | OUTPATIENT
Start: 2025-03-14 | End: 2025-03-18

## 2025-03-14 RX ORDER — MAGNESIUM SULFATE 500 MG/ML
1 SYRINGE (ML) INJECTION ONCE
Refills: 0 | Status: DISCONTINUED | OUTPATIENT
Start: 2025-03-14 | End: 2025-03-16

## 2025-03-14 RX ADMIN — LISINOPRIL 5 MILLIGRAM(S): 5 TABLET ORAL at 11:24

## 2025-03-14 RX ADMIN — Medication 25 GRAM(S): at 06:25

## 2025-03-14 RX ADMIN — Medication 15 MILLILITER(S): at 17:55

## 2025-03-14 RX ADMIN — Medication 25 GRAM(S): at 22:53

## 2025-03-14 RX ADMIN — Medication 500000 UNIT(S): at 00:05

## 2025-03-14 RX ADMIN — CLOTRIMAZOLE 1 APPLICATION(S): 1 CREAM TOPICAL at 17:56

## 2025-03-14 RX ADMIN — QUETIAPINE FUMARATE 12.5 MILLIGRAM(S): 25 TABLET ORAL at 22:21

## 2025-03-14 RX ADMIN — Medication 15 MILLILITER(S): at 11:23

## 2025-03-14 RX ADMIN — Medication 0.2 MILLIGRAM(S): at 13:56

## 2025-03-14 RX ADMIN — Medication 0.2 MILLIGRAM(S): at 22:20

## 2025-03-14 RX ADMIN — Medication 0.2 MILLIGRAM(S): at 06:26

## 2025-03-14 RX ADMIN — FUROSEMIDE 40 MILLIGRAM(S): 10 INJECTION INTRAMUSCULAR; INTRAVENOUS at 13:56

## 2025-03-14 RX ADMIN — FUROSEMIDE 40 MILLIGRAM(S): 10 INJECTION INTRAMUSCULAR; INTRAVENOUS at 06:32

## 2025-03-14 RX ADMIN — IPRATROPIUM BROMIDE AND ALBUTEROL SULFATE 3 MILLILITER(S): .5; 2.5 SOLUTION RESPIRATORY (INHALATION) at 15:03

## 2025-03-14 RX ADMIN — Medication 100 GRAM(S): at 11:24

## 2025-03-14 RX ADMIN — Medication 1 APPLICATION(S): at 06:27

## 2025-03-14 RX ADMIN — Medication 25 GRAM(S): at 13:56

## 2025-03-14 RX ADMIN — Medication 500000 UNIT(S): at 11:24

## 2025-03-14 RX ADMIN — IPRATROPIUM BROMIDE AND ALBUTEROL SULFATE 3 MILLILITER(S): .5; 2.5 SOLUTION RESPIRATORY (INHALATION) at 10:27

## 2025-03-14 RX ADMIN — IPRATROPIUM BROMIDE AND ALBUTEROL SULFATE 3 MILLILITER(S): .5; 2.5 SOLUTION RESPIRATORY (INHALATION) at 03:12

## 2025-03-14 RX ADMIN — GABAPENTIN 100 MILLIGRAM(S): 400 CAPSULE ORAL at 13:56

## 2025-03-14 RX ADMIN — IPRATROPIUM BROMIDE AND ALBUTEROL SULFATE 3 MILLILITER(S): .5; 2.5 SOLUTION RESPIRATORY (INHALATION) at 21:29

## 2025-03-14 RX ADMIN — AMLODIPINE BESYLATE 10 MILLIGRAM(S): 10 TABLET ORAL at 06:32

## 2025-03-14 RX ADMIN — Medication 4 MILLILITER(S): at 10:27

## 2025-03-14 RX ADMIN — Medication 4 MILLILITER(S): at 21:29

## 2025-03-14 RX ADMIN — Medication 500000 UNIT(S): at 22:21

## 2025-03-14 RX ADMIN — Medication 500000 UNIT(S): at 06:24

## 2025-03-14 RX ADMIN — GABAPENTIN 100 MILLIGRAM(S): 400 CAPSULE ORAL at 06:25

## 2025-03-14 RX ADMIN — ARGATROBAN 24.1 MICROGRAM(S)/KG/MIN: 100 INJECTION, SOLUTION INTRAVENOUS at 07:54

## 2025-03-14 RX ADMIN — Medication 500000 UNIT(S): at 17:55

## 2025-03-14 RX ADMIN — GABAPENTIN 100 MILLIGRAM(S): 400 CAPSULE ORAL at 22:21

## 2025-03-14 RX ADMIN — Medication 40 MILLIEQUIVALENT(S): at 11:24

## 2025-03-14 RX ADMIN — CLOTRIMAZOLE 1 APPLICATION(S): 1 CREAM TOPICAL at 06:27

## 2025-03-14 NOTE — PROGRESS NOTE ADULT - SUBJECTIVE AND OBJECTIVE BOX
CHIEF COMPLAINT: Patient is a 39y old  Male who presents with a chief complaint of sob (02 Mar 2025 06:19)      INTERVAL EVENTS:   - no acute overnight events, VSS, afebrile, tachycardia  - continued on IV Zosyn, Tmax overnight 100.1 F  - return of green dye on SLP evaluation - continued NPO with tube feeds with plan to reassess next week    ROS: Seen by bedside during AM rounds     OBJECTIVE:  ICU Vital Signs Last 24 Hrs  T(C): 36.8 (13 Mar 2025 20:00), Max: 36.8 (13 Mar 2025 11:16)  T(F): 98.2 (13 Mar 2025 20:00), Max: 98.2 (13 Mar 2025 11:16)  HR: 118 (14 Mar 2025 04:00) (87 - 120)  BP: 152/88 (14 Mar 2025 04:00) (133/80 - 161/95)  BP(mean): 106 (14 Mar 2025 04:00) (96 - 106)  ABP: --  ABP(mean): --  RR: 32 (14 Mar 2025 04:00) (20 - 32)  SpO2: 95% (14 Mar 2025 04:00) (95% - 99%)    O2 Parameters below as of 14 Mar 2025 04:00  Patient On (Oxygen Delivery Method): tracheostomy collar  O2 Flow (L/min): 8  O2 Concentration (%): 40          03-13 @ 07:01  -  03-14 @ 07:00  --------------------------------------------------------  IN: 1769.2 mL / OUT: 1200 mL / NET: 569.2 mL      CAPILLARY BLOOD GLUCOSE      POCT Blood Glucose.: 109 mg/dL (14 Mar 2025 06:19)      PHYSICAL EXAM:  General:   HEENT:   Lymph Nodes:  Neck:   Respiratory:   Cardiovascular:   Abdomen:   Extremities:   Skin:   Neurological:  Psychiatry:        HOSPITAL MEDICATIONS:  MEDICATIONS  (STANDING):  albuterol/ipratropium for Nebulization 3 milliLiter(s) Nebulizer every 6 hours  amLODIPine   Tablet 10 milliGRAM(s) Oral daily  argatroban Infusion 2 MICROgram(s)/kG/Min (24.1 mL/Hr) IV Continuous <Continuous>  chlorhexidine 2% Cloths 1 Application(s) Topical <User Schedule>  cloNIDine 0.2 milliGRAM(s) Oral every 8 hours  clotrimazole 1% Cream 1 Application(s) Topical two times a day  dextrose 50% Injectable 25 Gram(s) IV Push once  dextrose 50% Injectable 12.5 Gram(s) IV Push once  dextrose 50% Injectable 25 Gram(s) IV Push once  furosemide    Tablet 40 milliGRAM(s) Oral two times a day  gabapentin Solution 100 milliGRAM(s) Oral three times a day  glucagon  Injectable 1 milliGRAM(s) IntraMuscular once  insulin lispro (ADMELOG) corrective regimen sliding scale   SubCutaneous every 6 hours  lisinopril 5 milliGRAM(s) Oral every 24 hours  magnesium sulfate  IVPB 1 Gram(s) IV Intermittent once  multivitamin/minerals/iron Oral Solution (CENTRUM) 15 milliLiter(s) Oral daily  nystatin    Suspension 518642 Unit(s) Oral every 6 hours  piperacillin/tazobactam IVPB.. 4.5 Gram(s) IV Intermittent every 8 hours  polyethylene glycol 3350 17 Gram(s) Oral daily  potassium chloride   Powder 40 milliEquivalent(s) Oral once  QUEtiapine 25 milliGRAM(s) Oral at bedtime  senna Syrup 5 milliLiter(s) Oral at bedtime  sodium chloride 3%  Inhalation 4 milliLiter(s) Inhalation every 12 hours    MEDICATIONS  (PRN):  acetaminophen   Oral Liquid .. 650 milliGRAM(s) Oral every 6 hours PRN Temp greater or equal to 38C (100.4F), Mild Pain (1 - 3), Moderate Pain (4 - 6)      LABS:                        8.5    17.30 )-----------( 177      ( 13 Mar 2025 06:20 )             30.7     03-13    145  |  110[H]  |  16  ----------------------------<  127[H]  3.8   |  27  |  0.69    Ca    8.3[L]      13 Mar 2025 06:20  Phos  3.1     03-13  Mg     1.80     03-13    TPro  5.8[L]  /  Alb  2.8[L]  /  TBili  0.9  /  DBili  x   /  AST  25  /  ALT  41  /  AlkPhos  64  03-13    PT/INR - ( 13 Mar 2025 06:20 )   PT: 23.2 sec;   INR: 1.96 ratio         PTT - ( 13 Mar 2025 06:20 )  PTT:44.9 sec  Urinalysis Basic - ( 13 Mar 2025 06:20 )    Color: x / Appearance: x / SG: x / pH: x  Gluc: 127 mg/dL / Ketone: x  / Bili: x / Urobili: x   Blood: x / Protein: x / Nitrite: x   Leuk Esterase: x / RBC: x / WBC x   Sq Epi: x / Non Sq Epi: x / Bacteria: x         CHIEF COMPLAINT: Patient is a 39y old  Male who presents with a chief complaint of sob (02 Mar 2025 06:19)      INTERVAL EVENTS:   - no acute overnight events, VSS, afebrile, tachycardia  - continued on IV Zosyn, Tmax overnight 100.1 F  - return of green dye on SLP evaluation - continued NPO with tube feeds with plan to reassess next week    ROS: Seen by bedside during AM rounds, denies cp or sob, states pain in b/l feet is improving, endorses wanting to drink/ take PO     OBJECTIVE:  ICU Vital Signs Last 24 Hrs  T(C): 36.8 (13 Mar 2025 20:00), Max: 36.8 (13 Mar 2025 11:16)  T(F): 98.2 (13 Mar 2025 20:00), Max: 98.2 (13 Mar 2025 11:16)  HR: 118 (14 Mar 2025 04:00) (87 - 120)  BP: 152/88 (14 Mar 2025 04:00) (133/80 - 161/95)  BP(mean): 106 (14 Mar 2025 04:00) (96 - 106)  ABP: --  ABP(mean): --  RR: 32 (14 Mar 2025 04:00) (20 - 32)  SpO2: 95% (14 Mar 2025 04:00) (95% - 99%)    O2 Parameters below as of 14 Mar 2025 04:00  Patient On (Oxygen Delivery Method): tracheostomy collar  O2 Flow (L/min): 8  O2 Concentration (%): 40          03-13 @ 07:01  -  03-14 @ 07:00  --------------------------------------------------------  IN: 1769.2 mL / OUT: 1200 mL / NET: 569.2 mL      CAPILLARY BLOOD GLUCOSE      POCT Blood Glucose.: 109 mg/dL (14 Mar 2025 06:19)      PHYSICAL EXAM:  General: NAD  Neck: neck is supple, no JVD, +tracheostomy tube in place with sutures c/d/i, sutures noted from ecmo cannulation on R side of neck  Respiratory: +tolerating tracheostomy collar, lungs clear to ausculation b/l, +diminished at b/l bases, no wheezing, no rales, +tachypnea  Cardiovascular: +sinus tachycardia  Abdomen: soft, non tender, +PEG  Extremities: no LE edema b/l, c/d/i dressings over b/l feet  Skin: warm and dry  Neurological: AAO x 3, awake and alert, moving extremities freely, follows commands   Psychiatry: normal affect and behavior         HOSPITAL MEDICATIONS:  MEDICATIONS  (STANDING):  albuterol/ipratropium for Nebulization 3 milliLiter(s) Nebulizer every 6 hours  amLODIPine   Tablet 10 milliGRAM(s) Oral daily  argatroban Infusion 2 MICROgram(s)/kG/Min (24.1 mL/Hr) IV Continuous <Continuous>  chlorhexidine 2% Cloths 1 Application(s) Topical <User Schedule>  cloNIDine 0.2 milliGRAM(s) Oral every 8 hours  clotrimazole 1% Cream 1 Application(s) Topical two times a day  dextrose 50% Injectable 25 Gram(s) IV Push once  dextrose 50% Injectable 12.5 Gram(s) IV Push once  dextrose 50% Injectable 25 Gram(s) IV Push once  furosemide    Tablet 40 milliGRAM(s) Oral two times a day  gabapentin Solution 100 milliGRAM(s) Oral three times a day  glucagon  Injectable 1 milliGRAM(s) IntraMuscular once  insulin lispro (ADMELOG) corrective regimen sliding scale   SubCutaneous every 6 hours  lisinopril 5 milliGRAM(s) Oral every 24 hours  magnesium sulfate  IVPB 1 Gram(s) IV Intermittent once  multivitamin/minerals/iron Oral Solution (CENTRUM) 15 milliLiter(s) Oral daily  nystatin    Suspension 848791 Unit(s) Oral every 6 hours  piperacillin/tazobactam IVPB.. 4.5 Gram(s) IV Intermittent every 8 hours  polyethylene glycol 3350 17 Gram(s) Oral daily  potassium chloride   Powder 40 milliEquivalent(s) Oral once  QUEtiapine 25 milliGRAM(s) Oral at bedtime  senna Syrup 5 milliLiter(s) Oral at bedtime  sodium chloride 3%  Inhalation 4 milliLiter(s) Inhalation every 12 hours    MEDICATIONS  (PRN):  acetaminophen   Oral Liquid .. 650 milliGRAM(s) Oral every 6 hours PRN Temp greater or equal to 38C (100.4F), Mild Pain (1 - 3), Moderate Pain (4 - 6)      LABS:                        8.5    17.30 )-----------( 177      ( 13 Mar 2025 06:20 )             30.7     03-13    145  |  110[H]  |  16  ----------------------------<  127[H]  3.8   |  27  |  0.69    Ca    8.3[L]      13 Mar 2025 06:20  Phos  3.1     03-13  Mg     1.80     03-13    TPro  5.8[L]  /  Alb  2.8[L]  /  TBili  0.9  /  DBili  x   /  AST  25  /  ALT  41  /  AlkPhos  64  03-13    PT/INR - ( 13 Mar 2025 06:20 )   PT: 23.2 sec;   INR: 1.96 ratio         PTT - ( 13 Mar 2025 06:20 )  PTT:44.9 sec  Urinalysis Basic - ( 13 Mar 2025 06:20 )    Color: x / Appearance: x / SG: x / pH: x  Gluc: 127 mg/dL / Ketone: x  / Bili: x / Urobili: x   Blood: x / Protein: x / Nitrite: x   Leuk Esterase: x / RBC: x / WBC x   Sq Epi: x / Non Sq Epi: x / Bacteria: x

## 2025-03-14 NOTE — PROGRESS NOTE ADULT - ASSESSMENT
40 yo M w/ class III obesity, pAfib (no AC), HTN, BEA (on CPAP), history of b/l papilledema attributed to pseudotumor cerebri, who is transferred from [place] on 2/26 for VV ECMO 2/2 ARDS. Patient initially presented to [place] on 2/24 for SOB and b/l LE edema. As per chart review, patient endorses some degree of SOB and b/l LE edema x 3 months with significant weight gain. As per ICU team, patient had positive sick contacts with viral URI 1 to 2 weeks PTA and since then has had worsening SOB. Patient found to be reportedly hypoxemic to 70% on RA with worsening respiratory status/secretions and somnolence in the ED. Patient was a difficult intubation (~6 attempts). Despite optimal vent management, patient remained hypoxemic. Unable to prone due to obesity. Patient cannulated for VV ECMO on 2/25 and transferred to [facility] for further management. Patient now decannulated and s/p trach/PEG placed 3/7. Currently improving, alert and oriented weaning sedation. Transferred to RCU on 3/12.     Neuro  # Mental status   # pseudotumor cerebri  hx of pseudotumor cerebri s/p LP under fluoro in 2024 with high opening pressure with MRI 2024 also showed possible pituitary mass but unclear because of pressure effect from pseudotumor cerebri causing partially empty sella. Considering to start acetazolamide outpatient but never started  Prolactin high (30), defer checking cortisol axis given already received steroids but should be investigated, ACTH (<1.5) low but should be suppressed iso steroids  Concern for pituitary adenoma, however TSH .79 normal,  fT4 0.9 normal, and low T3 67  - Weaned off precedex, on clonidine 0.2mg q8hrs iso HTN and tachycardia  - currently on Seroquel 25mg QHS    - PT/OT following    # Neuropathy  - Trialing Gabapentin 100 TID for b/l LE neuropathy 3/13    Cardiovascular  # HTN   # Atrial fibrillation   Hx of a. fib not on AC has not had any episodes during admission   TTE from 2024 did not demonstrate elevation of pulmonary pressures, but now with severe PH suggesting chronicity on TTE at    TTE on 2/25 showed LV EF 61%, enlarged RV with TAPSE 2.1cm, ePASP at least 49 (Patient had 10/2024 TTE with normal LV and RV with ePASP 14).  ROBERT 3/1 showing mild TR, enlarged RV with normal LV/RV function. PEEP increased to 24 without signs of RV dilation  troponins initially elevated, peaked at 162 however downtrended likely from RV dilation and strain - EKG neg for any typical ischemia  repeat TTE 3/11  Normal LV, EF 66 %, RV not well visualized. enlarged right ventricular cavity size, reduced right ventricular systolic function.  (TAPSE) is 1.6 cm. No evidence of pulmonary hypertension.    - amlodipine 10mg daily + lisinopril 5mg daily  - ECG daily to monitor QTc  as has been on higher end but will watch closely on Seroquel        Pulmonary  #Pulmonary HTN  #ARDS  #Multifocal Pneumonia   #BEA  #Trach-dependent   History of diagnosed BEA/?OHS intermittent compliance to home PAP  c/f acute on suspected chronic pulmonary hypertension in setting of possible BEA/OHS c/b pneumonia, ARDS, and pulmonary edema requiring difficult intubation on 2/25 and VVecmo 2/25-3/7. Now s/p tracheostomy 3/7 with weaning trials.   CTA chest on 2/24 negative for pulmonary embolism, notable for patchy bilateral lower lobe opacities, hepatomegaly, mild ascites, edema/induration of the abdominal pannus.  Full RVP and BAL cultures negative    s/p Dexamethasone followed DEXA/ARDS protocol (20mg x5 days then 10mg x5 days)    - C/w duoNeb q6 hours + 3% saline (reduced to BID on 3/13)  - c/w speaking valve   - S/p Flex bronchoscopy tracheostomy #6 portex proximal XLT, placed 3/7 by CT surgery in OR, CTsx to remove trach sutures -> 3/17  - CTsx to remove ECMO sutures ~10 days post decannulation ->  3/17      Gastrointestinal  #Diet - w/ peg   #Transaminitis - resolving  #constipation-resolved   s/p Endoscopic percutaneous gastrostomy LUQ PEG 3/7   tolerated tube feeds via PEG    elevated Tbili likely iso hemolysis 2/2 high ECMO flows, mild transaminitis now improving   hepatomegaly and mild ascites noted on CT A/P - no pocket to tap  RUQ ultrasound with steatosis      - c/w bowel regimen with Senna and Miralax flexiseal in place   - nutrition following   - SLP failed green dye test on 3/13, plan to reassess next week      Gu/Renal  #ELLA  #Hypernatremia   #Non-AG Metabolic Alkalosis  ELLA - improved, likely ATN/vascular congestion  baires removed 3/10, passed TOV    - Free water flushes 350ml q4 hr for hypernatremia    - Lasix 40mg PO BID (increased 3/13)  - monitor urine and electrolytes closely        Infectious disease  #leukocytosis  #Pneumonia   #cellulitis   #febrile   Leukocytosis, remains febrile post decannulation but c/o increased sputum  Repeat and follow up UA, sputum and blood cultures   Recently completed course of zosyn (2/26-3/9) to cover cellulitis/pneumonia  s/p cefepime, linezolid,  aztihro 2/25-2/26 and  vanco (2/26-3/1)   full RVP negativestrep pneumo Ag and urine legionella negative   s/p Mupiricon b/l nares for +MSSA  (2/26-3/3)  BAL cultures all with NGTD  ?penile meatus yellow discharge resolved, GC/chamydia and HIV negative     - rpt MRSA swab negative and Vancomycin discontinued   - c/w zosyn   - candidal intertrigo +/- cellulitis of the pannus - clotrimazole cream 1% BID 2/26 -+ abx as per above      Hematology/DVT ppx  #Anticoagulation   #Thrombocytopenia  #Anemia   Hb stable, thrombocytopenia likely r/t shearing 2/2 ECMO circuit now improved off circuit   4T score low probability of HIT, received heparin at OSH, PF4 neg, SHAWANDA negative   LE duplex 2/25 neg    - c/w argatroban with goal 50-70 pending official VA duplex post decannulation   - VA duplex to upper and lower ext negative for DVT 3/9 will continue argatroban   - repeat VA duplex on 3/14     Endocrine  #DM  A1c 6.7; obesity with metabolic syndrome  diabetes with steroid induced hyperglycemia, mild - ISS  s/p DEXA/ARDS dexamethasone protocol   pituitary workup largely unremarkable, steroids as above given recent weight gain but likely large component of volume    - c/w FS Q6h and low ISS and adjust if needed now off steroids     SKin/Lines  #Access  #Cellulitis   #DTI with skin sloughing  #Blisters  - L subclavian TLC inserted 2/25-3/8  - L radial artery line 2/25-3/11  - Right IJ and Right Fem ECMO cannulas 2/25-3/7  - candidal intertrigo +/- cellulitis of the pannus - clotrimazole cream 1% BID 2/26 -+ abx as per above  - DTI to right buttocks (sloughing of skin) and B/L feet Blisters    - podiatry following consulted for  b/l foot blisters- recs vascular consult and on 3/4 lanced bilateral foot serous blister with mild serous drainage now with wound care and f/u out patient   - Vascular consulted b/l blisters likely pressure related, recommending z flow boots for offloading  - No arterial disease and b/l 3 vessel runoff on VA duplex performed 3/5. No indication for further invasive or non-invasive studies    GOC   - full code   - palliative involved was following while on ECMO   - Mother, father involved in care are next of kin/surrogates and patient's girlfriend/partner is also involved but defers to parents 40 yo M w/ class III obesity, pAfib (no AC), HTN, BEA (on CPAP), history of b/l papilledema attributed to pseudotumor cerebri, who is transferred from [place] on 2/26 for VV ECMO 2/2 ARDS. Patient initially presented to [place] on 2/24 for SOB and b/l LE edema. As per chart review, patient endorses some degree of SOB and b/l LE edema x 3 months with significant weight gain. As per ICU team, patient had positive sick contacts with viral URI 1 to 2 weeks PTA and since then has had worsening SOB. Patient found to be reportedly hypoxemic to 70% on RA with worsening respiratory status/secretions and somnolence in the ED. Patient was a difficult intubation (~6 attempts). Despite optimal vent management, patient remained hypoxemic. Unable to prone due to obesity. Patient cannulated for VV ECMO on 2/25 and transferred to [facility] for further management. Patient now decannulated and s/p trach/PEG placed 3/7. Currently improving, alert and oriented weaning sedation. Transferred to RCU on 3/12.     Neuro  # Mental status   # pseudotumor cerebri  hx of pseudotumor cerebri s/p LP under fluoro in 2024 with high opening pressure with MRI 2024 also showed possible pituitary mass but unclear because of pressure effect from pseudotumor cerebri causing partially empty sella. Considering to start acetazolamide outpatient but never started  Prolactin high (30), defer checking cortisol axis given already received steroids but should be investigated, ACTH (<1.5) low but should be suppressed iso steroids  Concern for pituitary adenoma, however TSH .79 normal,  fT4 0.9 normal, and low T3 67  - Weaned off precedex, on clonidine 0.2mg q8hrs iso HTN and tachycardia  - currently on Seroquel 25mg QHS    - PT/OT following    # Neuropathy  - Trialing Gabapentin 100 TID for b/l LE neuropathy 3/13  - pt reports pain is improving     Cardiovascular  # HTN   # Atrial fibrillation   Hx of a. raegan not on AC has not had any episodes during admission   TTE from 2024 did not demonstrate elevation of pulmonary pressures, but now with severe PH suggesting chronicity on TTE at    TTE on 2/25 showed LV EF 61%, enlarged RV with TAPSE 2.1cm, ePASP at least 49 (Patient had 10/2024 TTE with normal LV and RV with ePASP 14).  ROBERT 3/1 showing mild TR, enlarged RV with normal LV/RV function. PEEP increased to 24 without signs of RV dilation  troponins initially elevated, peaked at 162 however downtrended likely from RV dilation and strain - EKG neg for any typical ischemia  repeat TTE 3/11  Normal LV, EF 66 %, RV not well visualized. enlarged right ventricular cavity size, reduced right ventricular systolic function.  (TAPSE) is 1.6 cm. No evidence of pulmonary hypertension.    - amlodipine 10mg daily + lisinopril 5mg daily  - ECG daily to monitor QTc  as has been on higher end but will watch closely on Seroquel        Pulmonary  #Pulmonary HTN  #ARDS  #Multifocal Pneumonia   #BEA  #Trach-dependent   History of diagnosed BEA/?OHS intermittent compliance to home PAP  c/f acute on suspected chronic pulmonary hypertension in setting of possible BEA/OHS c/b pneumonia, ARDS, and pulmonary edema requiring difficult intubation on 2/25 and VVecmo 2/25-3/7. Now s/p tracheostomy 3/7 with weaning trials.   CTA chest on 2/24 negative for pulmonary embolism, notable for patchy bilateral lower lobe opacities, hepatomegaly, mild ascites, edema/induration of the abdominal pannus.  Full RVP and BAL cultures negative    s/p Dexamethasone followed DEXA/ARDS protocol (20mg x5 days then 10mg x5 days)    - C/w duoNeb q6 hours + 3% saline (reduced to BID on 3/13)  - c/w speaking valve   - S/p Flex bronchoscopy tracheostomy #6 portex proximal XLT, placed 3/7 by CT surgery in OR, CTsx to remove trach sutures -> 3/17  - CTsx to remove ECMO sutures ~10 days post decannulation ->  3/17      Gastrointestinal  #Diet - w/ peg   #Transaminitis - resolving  #constipation-resolved   s/p Endoscopic percutaneous gastrostomy LUQ PEG 3/7   tolerated tube feeds via PEG    elevated Tbili likely iso hemolysis 2/2 high ECMO flows, mild transaminitis now improving   hepatomegaly and mild ascites noted on CT A/P - no pocket to tap  RUQ ultrasound with steatosis      - c/w bowel regimen with Senna and Miralax flexiseal in place   - nutrition following   - SLP failed green dye test on 3/13, plan to reassess next week      Gu/Renal  #ELLA  #Hypernatremia   #Non-AG Metabolic Alkalosis  ELLA - improved, likely ATN/vascular congestion  baires removed 3/10, passed TOV    - Free water flushes 350ml q4 hr for hypernatremia    - Lasix 40mg PO BID (increased 3/13)  - monitor urine and electrolytes closely        Infectious disease  #leukocytosis  #Pneumonia   #cellulitis   #febrile   Leukocytosis, remains febrile post decannulation but c/o increased sputum  Repeat and follow up UA, sputum and blood cultures   Recently completed course of zosyn (2/26-3/9) to cover cellulitis/pneumonia  s/p cefepime, linezolid,  aztihro 2/25-2/26 and  vanco (2/26-3/1)   full RVP negativestrep pneumo Ag and urine legionella negative   s/p Mupiricon b/l nares for +MSSA  (2/26-3/3)  BAL cultures all with NGTD  ?penile meatus yellow discharge resolved, GC/chamydia and HIV negative     - rpt MRSA swab negative and Vancomycin discontinued   - c/w zosyn   - candidal intertrigo +/- cellulitis of the pannus - clotrimazole cream 1% BID 2/26 -+ abx as per above      Hematology/DVT ppx  #Anticoagulation   #Thrombocytopenia  #Anemia   Hb stable, thrombocytopenia likely r/t shearing 2/2 ECMO circuit now improved off circuit   4T score low probability of HIT, received heparin at OSH, PF4 neg, SHAWANDA negative   LE duplex 2/25 neg    - c/w argatroban with goal 50-70 pending official VA duplex post decannulation   - VA duplex to upper and lower ext negative for DVT 3/9 will continue argatroban   - repeat VA duplex on 3/14     Endocrine  #DM  A1c 6.7; obesity with metabolic syndrome  diabetes with steroid induced hyperglycemia, mild - ISS  s/p DEXA/ARDS dexamethasone protocol   pituitary workup largely unremarkable, steroids as above given recent weight gain but likely large component of volume    - c/w FS Q6h and low ISS and adjust if needed now off steroids     SKin/Lines  #Access  #Cellulitis   #DTI with skin sloughing  #Blisters  - L subclavian TLC inserted 2/25-3/8  - L radial artery line 2/25-3/11  - Right IJ and Right Fem ECMO cannulas 2/25-3/7  - candidal intertrigo +/- cellulitis of the pannus - clotrimazole cream 1% BID 2/26 -+ abx as per above  - DTI to right buttocks (sloughing of skin) and B/L feet Blisters    - podiatry following consulted for  b/l foot blisters- recs vascular consult and on 3/4 lanced bilateral foot serous blister with mild serous drainage now with wound care and f/u out patient   - Vascular consulted b/l blisters likely pressure related, recommending z flow boots for offloading  - No arterial disease and b/l 3 vessel runoff on VA duplex performed 3/5. No indication for further invasive or non-invasive studies    GOC   - full code   - palliative involved was following while on ECMO   - Mother, father involved in care are next of kin/surrogates and patient's girlfriend/partner is also involved but defers to parents 38 yo M w/ class III obesity, pAfib (no AC), HTN, BEA (on CPAP), history of b/l papilledema attributed to pseudotumor cerebri, who is transferred from [place] on 2/26 for VV ECMO 2/2 ARDS. Patient initially presented to [place] on 2/24 for SOB and b/l LE edema. As per chart review, patient endorses some degree of SOB and b/l LE edema x 3 months with significant weight gain. As per ICU team, patient had positive sick contacts with viral URI 1 to 2 weeks PTA and since then has had worsening SOB. Patient found to be reportedly hypoxemic to 70% on RA with worsening respiratory status/secretions and somnolence in the ED. Patient was a difficult intubation (~6 attempts). Despite optimal vent management, patient remained hypoxemic. Unable to prone due to obesity. Patient cannulated for VV ECMO on 2/25 and transferred to [facility] for further management. Patient now decannulated and s/p trach/PEG placed 3/7. Currently improving, alert and oriented weaning sedation. Transferred to RCU on 3/12.     Neuro  # Mental status   # pseudotumor cerebri  hx of pseudotumor cerebri s/p LP under fluoro in 2024 with high opening pressure with MRI 2024 also showed possible pituitary mass but unclear because of pressure effect from pseudotumor cerebri causing partially empty sella. Considering to start acetazolamide outpatient but never started  Prolactin high (30), defer checking cortisol axis given already received steroids but should be investigated, ACTH (<1.5) low but should be suppressed iso steroids  Concern for pituitary adenoma, however TSH .79 normal,  fT4 0.9 normal, and low T3 67  - Weaned off precedex, on clonidine 0.2mg q8hrs iso HTN and tachycardia  - currently on Seroquel 12.5 mg QHS (decreased 3/14)  - PT/OT following    # Neuropathy  - Trialing Gabapentin 100 TID for b/l LE neuropathy 3/13  - pt reports pain is improving     Cardiovascular  # HTN   # Atrial fibrillation   Hx of a. fib not on AC has not had any episodes during admission   TTE from 2024 did not demonstrate elevation of pulmonary pressures, but now with severe PH suggesting chronicity on TTE at    TTE on 2/25 showed LV EF 61%, enlarged RV with TAPSE 2.1cm, ePASP at least 49 (Patient had 10/2024 TTE with normal LV and RV with ePASP 14).  ROBERT 3/1 showing mild TR, enlarged RV with normal LV/RV function. PEEP increased to 24 without signs of RV dilation  troponins initially elevated, peaked at 162 however downtrended likely from RV dilation and strain - EKG neg for any typical ischemia  repeat TTE 3/11  Normal LV, EF 66 %, RV not well visualized. enlarged right ventricular cavity size, reduced right ventricular systolic function.  (TAPSE) is 1.6 cm. No evidence of pulmonary hypertension.    - amlodipine 10mg daily + lisinopril 5mg daily  - ECG daily to monitor QTc  as has been on higher end but will watch closely on Seroquel        Pulmonary  #Pulmonary HTN  #ARDS  #Multifocal Pneumonia   #BEA  #Trach-dependent   History of diagnosed BEA/?OHS intermittent compliance to home PAP  c/f acute on suspected chronic pulmonary hypertension in setting of possible BEA/OHS c/b pneumonia, ARDS, and pulmonary edema requiring difficult intubation on 2/25 and VVecmo 2/25-3/7. Now s/p tracheostomy 3/7 with weaning trials.   CTA chest on 2/24 negative for pulmonary embolism, notable for patchy bilateral lower lobe opacities, hepatomegaly, mild ascites, edema/induration of the abdominal pannus.  Full RVP and BAL cultures negative    s/p Dexamethasone followed DEXA/ARDS protocol (20mg x5 days then 10mg x5 days)    - C/w duoNeb q6 hours + 3% saline (reduced to BID on 3/13)  - c/w speaking valve   - S/p Flex bronchoscopy tracheostomy #6 portex proximal XLT, placed 3/7 by CT surgery in OR, CTsx to remove trach sutures -> 3/17  - CTsx to remove ECMO sutures ~10 days post decannulation ->  3/17      Gastrointestinal  #Diet - w/ peg   #Transaminitis - resolving  #constipation-resolved   s/p Endoscopic percutaneous gastrostomy LUQ PEG 3/7   tolerated tube feeds via PEG    elevated Tbili likely iso hemolysis 2/2 high ECMO flows, mild transaminitis now improving   hepatomegaly and mild ascites noted on CT A/P - no pocket to tap  RUQ ultrasound with steatosis      - c/w bowel regimen with Senna and Miralax flexiseal in place   - nutrition following   - SLP failed green dye test on 3/13, plan to reassess next week      Gu/Renal  #ELLA  #Hypernatremia   #Non-AG Metabolic Alkalosis  ELLA - improved, likely ATN/vascular congestion  baires removed 3/10, passed TOV    - Free water flushes 350ml q4 hr for hypernatremia    - Lasix 40mg PO BID (increased 3/13)  - monitor urine and electrolytes closely        Infectious disease  #leukocytosis  #Pneumonia   #cellulitis   #febrile   Leukocytosis, remains febrile post decannulation but c/o increased sputum  Repeat and follow up UA, sputum and blood cultures   Recently completed course of zosyn (2/26-3/9) to cover cellulitis/pneumonia  s/p cefepime, linezolid,  aztihro 2/25-2/26 and  vanco (2/26-3/1)   full RVP negativestrep pneumo Ag and urine legionella negative   s/p Mupiricon b/l nares for +MSSA  (2/26-3/3)  BAL cultures all with NGTD  ?penile meatus yellow discharge resolved, GC/chamydia and HIV negative     - rpt MRSA swab negative and Vancomycin discontinued   - c/w zosyn   - candidal intertrigo +/- cellulitis of the pannus - clotrimazole cream 1% BID 2/26 -+ abx as per above      Hematology/DVT ppx  #Anticoagulation   #Thrombocytopenia  #Anemia   #DVT  Hb stable, thrombocytopenia likely r/t shearing 2/2 ECMO circuit now improved off circuit   4T score low probability of HIT, received heparin at OSH, PF4 neg, SHAWANDA negative   LE duplex 2/25 neg    - c/w argatroban with goal 50-70 pending official VA duplex post decannulation   - VA duplex to upper and lower ext negative for DVT 3/9   - VA duplex on 3/14 - Acute, occlusive deep vein thromboses noted within the R and L peroneal veins at both mid calves. Acute, non-occlusive deep vein thrombosis noted within the R IJV. Superficial vein thrombosis noted within the R antecubital cephalic vein.  - Continue on Argatroban for now - eventual change to DOAC    Endocrine  #DM  A1c 6.7; obesity with metabolic syndrome  diabetes with steroid induced hyperglycemia, mild - ISS  s/p DEXA/ARDS dexamethasone protocol   pituitary workup largely unremarkable, steroids as above given recent weight gain but likely large component of volume    - c/w FS Q6h and low ISS and adjust if needed now off steroids     SKin/Lines  #Access  #Cellulitis   #DTI with skin sloughing  #Blisters  - L subclavian TLC inserted 2/25-3/8  - L radial artery line 2/25-3/11  - Right IJ and Right Fem ECMO cannulas 2/25-3/7  - candidal intertrigo +/- cellulitis of the pannus - clotrimazole cream 1% BID 2/26 -+ abx as per above  - DTI to right buttocks (sloughing of skin) and B/L feet Blisters    - podiatry following consulted for  b/l foot blisters- recs vascular consult and on 3/4 lanced bilateral foot serous blister with mild serous drainage now with wound care and f/u out patient   - Vascular consulted b/l blisters likely pressure related, recommending z flow boots for offloading  - No arterial disease and b/l 3 vessel runoff on VA duplex performed 3/5. No indication for further invasive or non-invasive studies    GOC   - full code   - palliative involved was following while on ECMO   - Mother, father involved in care are next of kin/surrogates and patient's girlfriend/partner is also involved but defers to parents 38 yo M w/ class III obesity, pAfib (no AC), HTN, BEA (on CPAP), history of b/l papilledema attributed to pseudotumor cerebri, who is transferred from [place] on 2/26 for VV ECMO 2/2 ARDS. Patient initially presented to [place] on 2/24 for SOB and b/l LE edema. As per chart review, patient endorses some degree of SOB and b/l LE edema x 3 months with significant weight gain. As per ICU team, patient had positive sick contacts with viral URI 1 to 2 weeks PTA and since then has had worsening SOB. Patient found to be reportedly hypoxemic to 70% on RA with worsening respiratory status/secretions and somnolence in the ED. Patient was a difficult intubation (~6 attempts). Despite optimal vent management, patient remained hypoxemic. Unable to prone due to obesity. Patient cannulated for VV ECMO on 2/25 and transferred to [facility] for further management. Patient now decannulated and s/p trach/PEG placed 3/7. Currently improving, alert and oriented weaning sedation. Transferred to RCU on 3/12.     Neuro  # Mental status   # pseudotumor cerebri  hx of pseudotumor cerebri s/p LP under fluoro in 2024 with high opening pressure with MRI 2024 also showed possible pituitary mass but unclear because of pressure effect from pseudotumor cerebri causing partially empty sella. Considering to start acetazolamide outpatient but never started  Prolactin high (30), defer checking cortisol axis given already received steroids but should be investigated, ACTH (<1.5) low but should be suppressed iso steroids  Concern for pituitary adenoma, however TSH .79 normal,  fT4 0.9 normal, and low T3 67  - Weaned off precedex, on clonidine 0.2mg q8hrs iso HTN and tachycardia  - currently on Seroquel 12.5 mg QHS (decreased 3/14)  - PT/OT following    # Neuropathy  - Trialing Gabapentin 100 TID for b/l LE neuropathy 3/13  - pt reports pain is improving     Cardiovascular  # HTN   # Atrial fibrillation   Hx of a. fib not on AC has not had any episodes during admission   TTE from 2024 did not demonstrate elevation of pulmonary pressures, but now with severe PH suggesting chronicity on TTE at    TTE on 2/25 showed LV EF 61%, enlarged RV with TAPSE 2.1cm, ePASP at least 49 (Patient had 10/2024 TTE with normal LV and RV with ePASP 14).  ROBERT 3/1 showing mild TR, enlarged RV with normal LV/RV function. PEEP increased to 24 without signs of RV dilation  troponins initially elevated, peaked at 162 however downtrended likely from RV dilation and strain - EKG neg for any typical ischemia  repeat TTE 3/11  Normal LV, EF 66 %, RV not well visualized. enlarged right ventricular cavity size, reduced right ventricular systolic function.  (TAPSE) is 1.6 cm. No evidence of pulmonary hypertension.    - amlodipine 10mg daily + lisinopril 5mg daily  - ECG daily to monitor QTc  as has been on higher end but will watch closely on Seroquel        Pulmonary  #Pulmonary HTN  #ARDS  #Multifocal Pneumonia   #BEA  #Trach-dependent   History of diagnosed BEA/?OHS intermittent compliance to home PAP  c/f acute on suspected chronic pulmonary hypertension in setting of possible BEA/OHS c/b pneumonia, ARDS, and pulmonary edema requiring difficult intubation on 2/25 and VVecmo 2/25-3/7. Now s/p tracheostomy 3/7 with weaning trials.   CTA chest on 2/24 negative for pulmonary embolism, notable for patchy bilateral lower lobe opacities, hepatomegaly, mild ascites, edema/induration of the abdominal pannus.  Full RVP and BAL cultures negative    s/p Dexamethasone followed DEXA/ARDS protocol (20mg x5 days then 10mg x5 days)    - C/w duoNeb q6 hours + 3% saline (reduced to BID on 3/13)  - c/w speaking valve   - S/p Flex bronchoscopy tracheostomy #6 portex proximal XLT, placed 3/7 by CT surgery in OR, CTsx to remove trach sutures -> 3/17  - CTsx to remove ECMO sutures ~10 days post decannulation ->  3/17      Gastrointestinal  #Diet - w/ peg   #Transaminitis - resolving  #constipation-resolved   s/p Endoscopic percutaneous gastrostomy LUQ PEG 3/7   tolerated tube feeds via PEG    elevated Tbili likely iso hemolysis 2/2 high ECMO flows, mild transaminitis now improving   hepatomegaly and mild ascites noted on CT A/P - no pocket to tap  RUQ ultrasound with steatosis      - c/w bowel regimen with Senna and Miralax flexiseal in place   - nutrition following   - SLP failed green dye test on 3/13, plan to reassess next week      Gu/Renal  #ELLA  #Hypernatremia   #Non-AG Metabolic Alkalosis  ELLA - improved, likely ATN/vascular congestion  baires removed 3/10, passed TOV    - Free water flushes 350ml q4 hr for hypernatremia    - Lasix 40mg PO BID (increased 3/13)  - monitor urine and electrolytes closely        Infectious disease  #leukocytosis  #Pneumonia   #cellulitis   #febrile   Leukocytosis, remains febrile post decannulation but c/o increased sputum  Repeat and follow up UA, sputum and blood cultures   Recently completed course of zosyn (2/26-3/9) to cover cellulitis/pneumonia  s/p cefepime, linezolid,  aztihro 2/25-2/26 and  vanco (2/26-3/1)   full RVP negativestrep pneumo Ag and urine legionella negative   s/p Mupiricon b/l nares for +MSSA  (2/26-3/3)  BAL cultures all with NGTD  ?penile meatus yellow discharge resolved, GC/chamydia and HIV negative     - rpt MRSA swab negative and Vancomycin discontinued   - c/w zosyn   - candidal intertrigo +/- cellulitis of the pannus - clotrimazole cream 1% BID 2/26 -+ abx as per above      Hematology/DVT ppx  #Anticoagulation   #Thrombocytopenia  #Anemia   #DVT  Hb stable, thrombocytopenia likely r/t shearing 2/2 ECMO circuit now improved off circuit   4T score low probability of HIT, received heparin at OSH, PF4 neg, SHAWANDA negative   LE duplex 2/25 neg    - c/w argatroban with goal 50-70 pending official VA duplex post decannulation   - VA duplex to upper and lower ext negative for DVT 3/9   - VA duplex on 3/14 - Acute, occlusive DVT noted within the R and L peroneal veins at both mid calves. Acute, non-occlusive DVTs noted within the R IJV. Superficial vein thrombosis noted within the R antecubital cephalic vein.  Age-indeterminate, occlusive DVTs within the L gastrocnemius vein at the proximal calf.  - Continue on Argatroban for now - eventual change to DOAC    Endocrine  #DM  A1c 6.7; obesity with metabolic syndrome  diabetes with steroid induced hyperglycemia, mild - ISS  s/p DEXA/ARDS dexamethasone protocol   pituitary workup largely unremarkable, steroids as above given recent weight gain but likely large component of volume    - c/w FS Q6h and low ISS and adjust if needed now off steroids     SKin/Lines  #Access  #Cellulitis   #DTI with skin sloughing  #Blisters  - L subclavian TLC inserted 2/25-3/8  - L radial artery line 2/25-3/11  - Right IJ and Right Fem ECMO cannulas 2/25-3/7  - candidal intertrigo +/- cellulitis of the pannus - clotrimazole cream 1% BID 2/26 -+ abx as per above  - DTI to right buttocks (sloughing of skin) and B/L feet Blisters    - podiatry following consulted for  b/l foot blisters- recs vascular consult and on 3/4 lanced bilateral foot serous blister with mild serous drainage now with wound care and f/u out patient   - Vascular consulted b/l blisters likely pressure related, recommending z flow boots for offloading  - No arterial disease and b/l 3 vessel runoff on VA duplex performed 3/5. No indication for further invasive or non-invasive studies    GOC   - full code   - palliative involved was following while on ECMO   - Mother, father involved in care are next of kin/surrogates and patient's girlfriend/partner is also involved but defers to parents

## 2025-03-14 NOTE — PROGRESS NOTE ADULT - NS ATTEND AMEND GEN_ALL_CORE FT
38 yo M w/ class III obesity, pAfib (no AC), HTN, BEA (on CPAP), history of b/l papilledema attributed to pseudotumor cerebri, who is transferred from [place] on 2/26 for VV ECMO 2/2 ARDS.     Patient transferred to [facility] for VV ECMO. S/P diiuresis, TTE/ROBERT with RV failure, s/p treatment of pneumonia. Now s/p trach and peg. Patient decannulated last Friday. Now currently on TC trials.     # acute hypoxemic respiratory failure  # RV failure  # oropharyngeal dysphagia  # Critical illness myopathy  # afib  # DVT  - ARDS s/p VV ecmo. Currently trach dependent, C/W nebs/airway clearance.   - OOB, ambulate as tolerated, aggressive PT  - C/W oral diuresis. Will need repeat TTE later this admission.   - Failed S/S eval. Will consider repeat each as he becomes stronger.   - C/W TC trials. will consider downsizing when able but currently a fresh trach  - C/W tube feeds via peg  - Fevers-complete course of abx. D/C vanc. C/W zosyn. Maybe related to ECMO. F/U with cultures.  - DVT noted on repeat duplex. C/W full ac   - C/W full a/c for now. Hx of afib, [id number] of 2  - DVT ppx- Full A/C  - Dispo- full code. Aggressive PT eval. OOB to chair.

## 2025-03-15 LAB
ADD ON TEST-SPECIMEN IN LAB: SIGNIFICANT CHANGE UP
ANION GAP SERPL CALC-SCNC: 8 MMOL/L — SIGNIFICANT CHANGE UP (ref 7–14)
APTT BLD: 50.9 SEC — HIGH (ref 24.5–35.6)
BASE EXCESS BLDV CALC-SCNC: 7.6 MMOL/L — HIGH (ref -2–3)
BASOPHILS # BLD AUTO: 0.02 K/UL — SIGNIFICANT CHANGE UP (ref 0–0.2)
BASOPHILS NFR BLD AUTO: 0.1 % — SIGNIFICANT CHANGE UP (ref 0–2)
BUN SERPL-MCNC: 11 MG/DL — SIGNIFICANT CHANGE UP (ref 7–23)
CALCIUM SERPL-MCNC: 8.2 MG/DL — LOW (ref 8.4–10.5)
CHLORIDE SERPL-SCNC: 103 MMOL/L — SIGNIFICANT CHANGE UP (ref 98–107)
CO2 BLDV-SCNC: 35 MMOL/L — HIGH (ref 22–26)
CO2 SERPL-SCNC: 31 MMOL/L — SIGNIFICANT CHANGE UP (ref 22–31)
CREAT SERPL-MCNC: 0.66 MG/DL — SIGNIFICANT CHANGE UP (ref 0.5–1.3)
CULTURE RESULTS: SIGNIFICANT CHANGE UP
EGFR: 124 ML/MIN/1.73M2 — SIGNIFICANT CHANGE UP
EGFR: 124 ML/MIN/1.73M2 — SIGNIFICANT CHANGE UP
EOSINOPHIL # BLD AUTO: 0.2 K/UL — SIGNIFICANT CHANGE UP (ref 0–0.5)
EOSINOPHIL NFR BLD AUTO: 1.4 % — SIGNIFICANT CHANGE UP (ref 0–6)
GAS PNL BLDV: SIGNIFICANT CHANGE UP
GLUCOSE BLDC GLUCOMTR-MCNC: 102 MG/DL — HIGH (ref 70–99)
GLUCOSE BLDC GLUCOMTR-MCNC: 119 MG/DL — HIGH (ref 70–99)
GLUCOSE BLDC GLUCOMTR-MCNC: 121 MG/DL — HIGH (ref 70–99)
GLUCOSE BLDC GLUCOMTR-MCNC: 122 MG/DL — HIGH (ref 70–99)
GLUCOSE BLDC GLUCOMTR-MCNC: 124 MG/DL — HIGH (ref 70–99)
GLUCOSE SERPL-MCNC: 117 MG/DL — HIGH (ref 70–99)
HCO3 BLDV-SCNC: 34 MMOL/L — HIGH (ref 22–29)
HCT VFR BLD CALC: 30.2 % — LOW (ref 39–50)
HGB BLD-MCNC: 8.4 G/DL — LOW (ref 13–17)
IANC: 11.19 K/UL — HIGH (ref 1.8–7.4)
IMM GRANULOCYTES NFR BLD AUTO: 0.5 % — SIGNIFICANT CHANGE UP (ref 0–0.9)
INR BLD: 1.88 RATIO — HIGH (ref 0.85–1.16)
LYMPHOCYTES # BLD AUTO: 1.33 K/UL — SIGNIFICANT CHANGE UP (ref 1–3.3)
LYMPHOCYTES # BLD AUTO: 9.6 % — LOW (ref 13–44)
MAGNESIUM SERPL-MCNC: 1.8 MG/DL — SIGNIFICANT CHANGE UP (ref 1.6–2.6)
MCHC RBC-ENTMCNC: 24.1 PG — LOW (ref 27–34)
MCHC RBC-ENTMCNC: 27.8 G/DL — LOW (ref 32–36)
MCV RBC AUTO: 86.8 FL — SIGNIFICANT CHANGE UP (ref 80–100)
MONOCYTES # BLD AUTO: 1.09 K/UL — HIGH (ref 0–0.9)
MONOCYTES NFR BLD AUTO: 7.8 % — SIGNIFICANT CHANGE UP (ref 2–14)
NEUTROPHILS # BLD AUTO: 11.19 K/UL — HIGH (ref 1.8–7.4)
NEUTROPHILS NFR BLD AUTO: 80.6 % — HIGH (ref 43–77)
NRBC # BLD AUTO: 0 K/UL — SIGNIFICANT CHANGE UP (ref 0–0)
NRBC # FLD: 0 K/UL — SIGNIFICANT CHANGE UP (ref 0–0)
NRBC BLD AUTO-RTO: 0 /100 WBCS — SIGNIFICANT CHANGE UP (ref 0–0)
PCO2 BLDV: 52 MMHG — SIGNIFICANT CHANGE UP (ref 42–55)
PH BLDV: 7.42 — SIGNIFICANT CHANGE UP (ref 7.32–7.43)
PHOSPHATE SERPL-MCNC: 3.1 MG/DL — SIGNIFICANT CHANGE UP (ref 2.5–4.5)
PLATELET # BLD AUTO: 220 K/UL — SIGNIFICANT CHANGE UP (ref 150–400)
PO2 BLDV: 59 MMHG — HIGH (ref 25–45)
POTASSIUM SERPL-MCNC: 3.9 MMOL/L — SIGNIFICANT CHANGE UP (ref 3.5–5.3)
POTASSIUM SERPL-SCNC: 3.9 MMOL/L — SIGNIFICANT CHANGE UP (ref 3.5–5.3)
PROCALCITONIN SERPL-MCNC: 0.1 NG/ML — SIGNIFICANT CHANGE UP (ref 0.02–0.1)
PROTHROM AB SERPL-ACNC: 22.2 SEC — HIGH (ref 9.9–13.4)
RBC # BLD: 3.48 M/UL — LOW (ref 4.2–5.8)
RBC # FLD: 23.6 % — HIGH (ref 10.3–14.5)
SAO2 % BLDV: 92 % — HIGH (ref 67–88)
SODIUM SERPL-SCNC: 142 MMOL/L — SIGNIFICANT CHANGE UP (ref 135–145)
SPECIMEN SOURCE: SIGNIFICANT CHANGE UP
WBC # BLD: 13.9 K/UL — HIGH (ref 3.8–10.5)
WBC # FLD AUTO: 13.9 K/UL — HIGH (ref 3.8–10.5)

## 2025-03-15 PROCEDURE — 99233 SBSQ HOSP IP/OBS HIGH 50: CPT

## 2025-03-15 RX ORDER — MAGNESIUM SULFATE 500 MG/ML
1 SYRINGE (ML) INJECTION ONCE
Refills: 0 | Status: DISCONTINUED | OUTPATIENT
Start: 2025-03-15 | End: 2025-03-16

## 2025-03-15 RX ORDER — FUROSEMIDE 10 MG/ML
40 INJECTION INTRAMUSCULAR; INTRAVENOUS EVERY 12 HOURS
Refills: 0 | Status: DISCONTINUED | OUTPATIENT
Start: 2025-03-15 | End: 2025-03-24

## 2025-03-15 RX ORDER — MELATONIN 5 MG
6 TABLET ORAL ONCE
Refills: 0 | Status: COMPLETED | OUTPATIENT
Start: 2025-03-15 | End: 2025-03-15

## 2025-03-15 RX ORDER — ACETAMINOPHEN 500 MG/5ML
325 LIQUID (ML) ORAL ONCE
Refills: 0 | Status: COMPLETED | OUTPATIENT
Start: 2025-03-15 | End: 2025-03-15

## 2025-03-15 RX ADMIN — QUETIAPINE FUMARATE 12.5 MILLIGRAM(S): 25 TABLET ORAL at 21:13

## 2025-03-15 RX ADMIN — CLOTRIMAZOLE 1 APPLICATION(S): 1 CREAM TOPICAL at 17:37

## 2025-03-15 RX ADMIN — IPRATROPIUM BROMIDE AND ALBUTEROL SULFATE 3 MILLILITER(S): .5; 2.5 SOLUTION RESPIRATORY (INHALATION) at 03:13

## 2025-03-15 RX ADMIN — Medication 0.2 MILLIGRAM(S): at 14:36

## 2025-03-15 RX ADMIN — Medication 500000 UNIT(S): at 12:52

## 2025-03-15 RX ADMIN — Medication 5 MILLILITER(S): at 21:07

## 2025-03-15 RX ADMIN — Medication 15 MILLILITER(S): at 12:52

## 2025-03-15 RX ADMIN — Medication 325 MILLIGRAM(S): at 21:45

## 2025-03-15 RX ADMIN — IPRATROPIUM BROMIDE AND ALBUTEROL SULFATE 3 MILLILITER(S): .5; 2.5 SOLUTION RESPIRATORY (INHALATION) at 19:56

## 2025-03-15 RX ADMIN — Medication 4 MILLILITER(S): at 19:56

## 2025-03-15 RX ADMIN — Medication 15 MILLILITER(S): at 05:09

## 2025-03-15 RX ADMIN — Medication 1000 MILLIGRAM(S): at 01:00

## 2025-03-15 RX ADMIN — Medication 0.2 MILLIGRAM(S): at 05:09

## 2025-03-15 RX ADMIN — CLOTRIMAZOLE 1 APPLICATION(S): 1 CREAM TOPICAL at 05:08

## 2025-03-15 RX ADMIN — IPRATROPIUM BROMIDE AND ALBUTEROL SULFATE 3 MILLILITER(S): .5; 2.5 SOLUTION RESPIRATORY (INHALATION) at 15:25

## 2025-03-15 RX ADMIN — FUROSEMIDE 40 MILLIGRAM(S): 10 INJECTION INTRAMUSCULAR; INTRAVENOUS at 14:36

## 2025-03-15 RX ADMIN — Medication 0.2 MILLIGRAM(S): at 21:13

## 2025-03-15 RX ADMIN — GABAPENTIN 100 MILLIGRAM(S): 400 CAPSULE ORAL at 21:13

## 2025-03-15 RX ADMIN — Medication 6 MILLIGRAM(S): at 05:01

## 2025-03-15 RX ADMIN — Medication 500000 UNIT(S): at 21:12

## 2025-03-15 RX ADMIN — Medication 25 GRAM(S): at 21:14

## 2025-03-15 RX ADMIN — Medication 15 MILLILITER(S): at 17:37

## 2025-03-15 RX ADMIN — AMLODIPINE BESYLATE 10 MILLIGRAM(S): 10 TABLET ORAL at 05:09

## 2025-03-15 RX ADMIN — Medication 25 GRAM(S): at 05:02

## 2025-03-15 RX ADMIN — GABAPENTIN 100 MILLIGRAM(S): 400 CAPSULE ORAL at 05:08

## 2025-03-15 RX ADMIN — FUROSEMIDE 40 MILLIGRAM(S): 10 INJECTION INTRAMUSCULAR; INTRAVENOUS at 05:09

## 2025-03-15 RX ADMIN — Medication 20 MILLIGRAM(S): at 22:59

## 2025-03-15 RX ADMIN — LISINOPRIL 5 MILLIGRAM(S): 5 TABLET ORAL at 12:52

## 2025-03-15 RX ADMIN — GABAPENTIN 100 MILLIGRAM(S): 400 CAPSULE ORAL at 14:36

## 2025-03-15 RX ADMIN — IPRATROPIUM BROMIDE AND ALBUTEROL SULFATE 3 MILLILITER(S): .5; 2.5 SOLUTION RESPIRATORY (INHALATION) at 07:05

## 2025-03-15 RX ADMIN — Medication 4 MILLILITER(S): at 07:05

## 2025-03-15 RX ADMIN — Medication 325 MILLIGRAM(S): at 21:12

## 2025-03-15 RX ADMIN — Medication 500000 UNIT(S): at 17:37

## 2025-03-15 RX ADMIN — Medication 25 GRAM(S): at 13:06

## 2025-03-15 RX ADMIN — Medication 400 MILLIGRAM(S): at 00:29

## 2025-03-15 RX ADMIN — FUROSEMIDE 40 MILLIGRAM(S): 10 INJECTION INTRAMUSCULAR; INTRAVENOUS at 18:43

## 2025-03-15 RX ADMIN — Medication 650 MILLIGRAM(S): at 17:38

## 2025-03-15 RX ADMIN — Medication 500000 UNIT(S): at 05:08

## 2025-03-15 RX ADMIN — Medication 1 APPLICATION(S): at 05:09

## 2025-03-15 NOTE — PROGRESS NOTE ADULT - SUBJECTIVE AND OBJECTIVE BOX
CHIEF COMPLAINT: Patient is a 39y old  Male who presents with a chief complaint of sob (02 Mar 2025 06:19)      INTERVAL EVENTS: No acute events overnight.    REVIEW OF SYSTEMS: Seen and evaluated by bedside during AM rounds.            OBJECTIVE:  ICU Vital Signs Last 24 Hrs  T(C): 38.8 (15 Mar 2025 00:20), Max: 38.8 (15 Mar 2025 00:20)  T(F): 101.9 (15 Mar 2025 00:20), Max: 101.9 (15 Mar 2025 00:20)  HR: 105 (15 Mar 2025 04:00) (105 - 128)  BP: 146/74 (15 Mar 2025 00:00) (118/71 - 153/88)  BP(mean): 85 (14 Mar 2025 20:00) (85 - 85)  ABP: --  ABP(mean): --  RR: 22 (15 Mar 2025 00:00) (20 - 35)  SpO2: 98% (15 Mar 2025 04:00) (97% - 100%)    O2 Parameters below as of 15 Mar 2025 04:00  Patient On (Oxygen Delivery Method): tracheostomy collar              03-13 @ 07:01 - 03-14 @ 07:00  --------------------------------------------------------  IN: 2369.2 mL / OUT: 1600 mL / NET: 769.2 mL    03-14 @ 07:01 - 03-15 @ 06:40  --------------------------------------------------------  IN: 2019.2 mL / OUT: 2401 mL / NET: -381.8 mL      CAPILLARY BLOOD GLUCOSE      POCT Blood Glucose.: 124 mg/dL (15 Mar 2025 06:39)      HOSPITAL MEDICATIONS:  MEDICATIONS  (STANDING):  albuterol/ipratropium for Nebulization 3 milliLiter(s) Nebulizer every 6 hours  amLODIPine   Tablet 10 milliGRAM(s) Oral daily  argatroban Infusion 2 MICROgram(s)/kG/Min (24.1 mL/Hr) IV Continuous <Continuous>  chlorhexidine 0.12% Liquid 15 milliLiter(s) Swish and Spit two times a day  chlorhexidine 2% Cloths 1 Application(s) Topical <User Schedule>  cloNIDine 0.2 milliGRAM(s) Oral every 8 hours  clotrimazole 1% Cream 1 Application(s) Topical two times a day  dextrose 50% Injectable 25 Gram(s) IV Push once  dextrose 50% Injectable 12.5 Gram(s) IV Push once  dextrose 50% Injectable 25 Gram(s) IV Push once  furosemide    Tablet 40 milliGRAM(s) Oral two times a day  gabapentin Solution 100 milliGRAM(s) Oral three times a day  glucagon  Injectable 1 milliGRAM(s) IntraMuscular once  insulin lispro (ADMELOG) corrective regimen sliding scale   SubCutaneous every 6 hours  lisinopril 5 milliGRAM(s) Oral every 24 hours  magnesium sulfate  IVPB 1 Gram(s) IV Intermittent once  multivitamin/minerals/iron Oral Solution (CENTRUM) 15 milliLiter(s) Oral daily  nystatin    Suspension 936302 Unit(s) Oral every 6 hours  piperacillin/tazobactam IVPB.. 4.5 Gram(s) IV Intermittent every 8 hours  polyethylene glycol 3350 17 Gram(s) Oral daily  potassium chloride   Powder 40 milliEquivalent(s) Oral once  QUEtiapine 12.5 milliGRAM(s) Oral at bedtime  senna Syrup 5 milliLiter(s) Oral at bedtime  sodium chloride 3%  Inhalation 4 milliLiter(s) Inhalation every 12 hours    MEDICATIONS  (PRN):  acetaminophen   Oral Liquid .. 650 milliGRAM(s) Oral every 6 hours PRN Temp greater or equal to 38C (100.4F), Mild Pain (1 - 3), Moderate Pain (4 - 6)      PHYSICAL EXAMINATION  General: Alert and cooperative. Resting comfortably. No apparent distress noted. Dry mucous membrane noted. White conjunctiva, no icterus.   HEENT: Normocephalic/atraumatic. EOMI. No discharge, conjunctivitis, or scleral icterus. No ptosis. No discharge or sinus tenderness noted. Mucous membranes are pink without bleeding. Tonsils are not enlarged. Good dental hygiene noted. No facial asymmetry. Neck is supple with a full range of motion. No masses or tenderness. Trachea is midline. Lymph nodes are not enlarged.   Cardiovascular: Normal rate and regular rhythm. No murmur/gallop.   Respiratory: Breath sounds clear and equal bilaterally without rales, wheezing, or rhonchi. No accessory muscles used.   Abdomen: Soft, nontender, nondistended. Bowel sounds are present. No bruit. No hepatosplenomegaly or masses. No rebound or guarding.   Skin: Warm to touch. No rashes, wounds, or skin lesions noted.   Extremities: +2 dorsalis pedis pulse is noted for the lower extremities. Full range of motion on all four extremities. 5/5 motor strength noted in all extremities. No clubbing, cyanosis, edema, or varicose veins.   MSK: No swelling or deformity. Posture, body symmetry, and movements are appropriate.   Neuro: AxO x 3, CN II - XII grossly intact.    LABS:                        8.3    14.17 )-----------( 214      ( 14 Mar 2025 06:33 )             29.7     03-14    145  |  106  |  13  ----------------------------<  107[H]  3.6   |  30  |  0.63    Ca    8.3[L]      14 Mar 2025 06:33  Phos  2.9     03-14  Mg     1.70     03-14      PT/INR - ( 14 Mar 2025 06:33 )   PT: 19.8 sec;   INR: 1.72 ratio         PTT - ( 14 Mar 2025 06:33 )  PTT:49.3 sec  Urinalysis Basic - ( 14 Mar 2025 06:33 )    Color: x / Appearance: x / SG: x / pH: x  Gluc: 107 mg/dL / Ketone: x  / Bili: x / Urobili: x   Blood: x / Protein: x / Nitrite: x   Leuk Esterase: x / RBC: x / WBC x   Sq Epi: x / Non Sq Epi: x / Bacteria: x        Venous Blood Gas:  03-14 @ 06:33  7.34/62/90/33/98.7  VBG Lactate: 1.2      PAST MEDICAL & SURGICAL HISTORY:  HTN (hypertension)      Atrial fibrillation  paroxysmal      Papilledema of both eyes      Chronic sinusitis      Morbid obesity      No significant past surgical history          FAMILY HISTORY:      Social History:  sexually active with female partner occasionally (26 Feb 2025 01:48)      RADIOLOGY:  [ ] Reviewed and interpreted by me    PULMONARY FUNCTION TESTS:    EKG: CHIEF COMPLAINT: Patient is a 39y old  Male who presents with a chief complaint of sob     INTERVAL EVENTS: Temp 101.9 OVN. TC 40% w/ VBG 7.30/70/76/35, placed on CPAP.    REVIEW OF SYSTEMS: Seen and evaluated by bedside during AM rounds. Patient endorses experiencing B/L foot pain. All other ROS negative.         OBJECTIVE:  ICU Vital Signs Last 24 Hrs  T(C): 38.8 (15 Mar 2025 00:20), Max: 38.8 (15 Mar 2025 00:20)  T(F): 101.9 (15 Mar 2025 00:20), Max: 101.9 (15 Mar 2025 00:20)  HR: 105 (15 Mar 2025 04:00) (105 - 128)  BP: 146/74 (15 Mar 2025 00:00) (118/71 - 153/88)  BP(mean): 85 (14 Mar 2025 20:00) (85 - 85)  ABP: --  ABP(mean): --  RR: 22 (15 Mar 2025 00:00) (20 - 35)  SpO2: 98% (15 Mar 2025 04:00) (97% - 100%)    O2 Parameters below as of 15 Mar 2025 04:00  Patient On (Oxygen Delivery Method): tracheostomy collar              03-13 @ 07:01 - 03-14 @ 07:00  --------------------------------------------------------  IN: 2369.2 mL / OUT: 1600 mL / NET: 769.2 mL    03-14 @ 07:01 - 03-15 @ 06:40  --------------------------------------------------------  IN: 2019.2 mL / OUT: 2401 mL / NET: -381.8 mL      CAPILLARY BLOOD GLUCOSE      POCT Blood Glucose.: 124 mg/dL (15 Mar 2025 06:39)      HOSPITAL MEDICATIONS:  MEDICATIONS  (STANDING):  albuterol/ipratropium for Nebulization 3 milliLiter(s) Nebulizer every 6 hours  amLODIPine   Tablet 10 milliGRAM(s) Oral daily  argatroban Infusion 2 MICROgram(s)/kG/Min (24.1 mL/Hr) IV Continuous <Continuous>  chlorhexidine 0.12% Liquid 15 milliLiter(s) Swish and Spit two times a day  chlorhexidine 2% Cloths 1 Application(s) Topical <User Schedule>  cloNIDine 0.2 milliGRAM(s) Oral every 8 hours  clotrimazole 1% Cream 1 Application(s) Topical two times a day  dextrose 50% Injectable 25 Gram(s) IV Push once  dextrose 50% Injectable 12.5 Gram(s) IV Push once  dextrose 50% Injectable 25 Gram(s) IV Push once  furosemide    Tablet 40 milliGRAM(s) Oral two times a day  gabapentin Solution 100 milliGRAM(s) Oral three times a day  glucagon  Injectable 1 milliGRAM(s) IntraMuscular once  insulin lispro (ADMELOG) corrective regimen sliding scale   SubCutaneous every 6 hours  lisinopril 5 milliGRAM(s) Oral every 24 hours  magnesium sulfate  IVPB 1 Gram(s) IV Intermittent once  multivitamin/minerals/iron Oral Solution (CENTRUM) 15 milliLiter(s) Oral daily  nystatin    Suspension 683614 Unit(s) Oral every 6 hours  piperacillin/tazobactam IVPB.. 4.5 Gram(s) IV Intermittent every 8 hours  polyethylene glycol 3350 17 Gram(s) Oral daily  potassium chloride   Powder 40 milliEquivalent(s) Oral once  QUEtiapine 12.5 milliGRAM(s) Oral at bedtime  senna Syrup 5 milliLiter(s) Oral at bedtime  sodium chloride 3%  Inhalation 4 milliLiter(s) Inhalation every 12 hours    MEDICATIONS  (PRN):  acetaminophen   Oral Liquid .. 650 milliGRAM(s) Oral every 6 hours PRN Temp greater or equal to 38C (100.4F), Mild Pain (1 - 3), Moderate Pain (4 - 6)      PHYSICAL EXAMINATION  General: Alert and cooperative. Resting comfortably on recliner chair. No apparent distress noted. + Family at bedside.  HEENT: Normocephalic/atraumatic.   Cardiovascular: Regular rhythm. + Sinus tachycardia    Respiratory: + Trach to vent (CPAP 12/6/40%). Breath sounds clear w/ diminished bases bilaterally without wheezing. No accessory muscles used.   Abdomen: + Central obesity. + PEG c/d/i. Soft, nontender, nondistended.   Skin: Warm to touch.   Extremities: + Full range of motion on upper extremities. Unable to move lower extremities. B/L lower extremities wrapped with gauze  No clubbing, cyanosis, edema, or varicose veins.   Neuro: AxO x 3. Follow commands.     LABS:                        8.3    14.17 )-----------( 214      ( 14 Mar 2025 06:33 )             29.7     03-14    145  |  106  |  13  ----------------------------<  107[H]  3.6   |  30  |  0.63    Ca    8.3[L]      14 Mar 2025 06:33  Phos  2.9     03-14  Mg     1.70     03-14      PT/INR - ( 14 Mar 2025 06:33 )   PT: 19.8 sec;   INR: 1.72 ratio         PTT - ( 14 Mar 2025 06:33 )  PTT:49.3 sec  Urinalysis Basic - ( 14 Mar 2025 06:33 )    Color: x / Appearance: x / SG: x / pH: x  Gluc: 107 mg/dL / Ketone: x  / Bili: x / Urobili: x   Blood: x / Protein: x / Nitrite: x   Leuk Esterase: x / RBC: x / WBC x   Sq Epi: x / Non Sq Epi: x / Bacteria: x        Venous Blood Gas:  03-14 @ 06:33  7.34/62/90/33/98.7  VBG Lactate: 1.2      PAST MEDICAL & SURGICAL HISTORY:  HTN (hypertension)      Atrial fibrillation  paroxysmal      Papilledema of both eyes      Chronic sinusitis      Morbid obesity      No significant past surgical history          FAMILY HISTORY:      Social History:  sexually active with female partner occasionally (26 Feb 2025 01:48)      RADIOLOGY:  [ ] Reviewed and interpreted by me    PULMONARY FUNCTION TESTS:    EKG:

## 2025-03-15 NOTE — PROGRESS NOTE ADULT - ASSESSMENT
40 yo M w/ class III obesity, pAfib (no AC), HTN, BEA (on CPAP), history of b/l papilledema attributed to pseudotumor cerebri, who is transferred from [place] on 2/26 for VV ECMO 2/2 ARDS. Patient initially presented to [place] on 2/24 for SOB and b/l LE edema. As per chart review, patient endorses some degree of SOB and b/l LE edema x 3 months with significant weight gain. As per ICU team, patient had positive sick contacts with viral URI 1 to 2 weeks PTA and since then has had worsening SOB. Patient found to be reportedly hypoxemic to 70% on RA with worsening respiratory status/secretions and somnolence in the ED. Patient was a difficult intubation (~6 attempts). Despite optimal vent management, patient remained hypoxemic. Unable to prone due to obesity. Patient cannulated for VV ECMO on 2/25 and transferred to [facility] for further management. Patient now decannulated and s/p trach/PEG placed 3/7. Currently improving, alert and oriented weaning sedation. Transferred to [facility] on 3/12.     Neuro  # Mental status   # pseudotumor cerebri  hx of pseudotumor cerebri s/p LP under fluoro in 2024 with high opening pressure with MRI 2024 also showed possible pituitary mass but unclear because of pressure effect from pseudotumor cerebri causing partially empty sella. Considering to start acetazolamide outpatient but never started  Prolactin high (30), defer checking cortisol axis given already received steroids but should be investigated, ACTH (<1.5) low but should be suppressed iso steroids  Concern for pituitary adenoma, however TSH .79 normal,  fT4 0.9 normal, and low T3 67  - Weaned off precedex, on clonidine 0.2mg q8hrs iso HTN and tachycardia  - currently on Seroquel 12.5 mg QHS (decreased 3/14)  - PT/OT following    # Neuropathy  - Trialing Gabapentin 100 TID for b/l LE neuropathy 3/13  - pt reports pain is improving     Cardiovascular  # HTN   # Atrial fibrillation   Hx of a. fib not on AC has not had any episodes during admission   TTE from 2024 did not demonstrate elevation of pulmonary pressures, but now with severe PH suggesting chronicity on TTE at    TTE on 2/25 showed LV EF 61%, enlarged RV with TAPSE 2.1cm, ePASP at least 49 (Patient had 10/2024 TTE with normal LV and RV with ePASP 14).  ROBERT 3/1 showing mild TR, enlarged RV with normal LV/RV function. PEEP increased to 24 without signs of RV dilation  troponins initially elevated, peaked at 162 however downtrended likely from RV dilation and strain - EKG neg for any typical ischemia  repeat TTE 3/11  Normal LV, EF 66 %, RV not well visualized. enlarged right ventricular cavity size, reduced right ventricular systolic function.  (TAPSE) is 1.6 cm. No evidence of pulmonary hypertension.    - amlodipine 10mg daily + lisinopril 5mg daily  - ECG daily to monitor QTc  as has been on higher end but will watch closely on Seroquel        Pulmonary  #Pulmonary HTN  #ARDS  #Multifocal Pneumonia   #BEA  #Trach-dependent   History of diagnosed BEA/?OHS intermittent compliance to home PAP  c/f acute on suspected chronic pulmonary hypertension in setting of possible BEA/OHS c/b pneumonia, ARDS, and pulmonary edema requiring difficult intubation on 2/25 and VVecmo 2/25-3/7. Now s/p tracheostomy 3/7 with weaning trials.   CTA chest on 2/24 negative for pulmonary embolism, notable for patchy bilateral lower lobe opacities, hepatomegaly, mild ascites, edema/induration of the abdominal pannus.  Full RVP and BAL cultures negative    s/p Dexamethasone followed DEXA/ARDS protocol (20mg x5 days then 10mg x5 days)    - C/w duoNeb q6 hours + 3% saline (reduced to BID on 3/13)  - c/w speaking valve   - S/p Flex bronchoscopy tracheostomy #6 portex proximal XLT, placed 3/7 by CT surgery in OR, CTsx to remove trach sutures -> 3/17  - CTsx to remove ECMO sutures ~10 days post decannulation ->  3/17      Gastrointestinal  #Diet - w/ peg   #Transaminitis - resolving  #constipation-resolved   s/p Endoscopic percutaneous gastrostomy LUQ PEG 3/7   tolerated tube feeds via PEG    elevated Tbili likely iso hemolysis 2/2 high ECMO flows, mild transaminitis now improving   hepatomegaly and mild ascites noted on CT A/P - no pocket to tap  RUQ ultrasound with steatosis      - c/w bowel regimen with Senna and Miralax flexiseal in place   - nutrition following   - SLP failed green dye test on 3/13, plan to reassess next week      Gu/Renal  #ELLA  #Hypernatremia   #Non-AG Metabolic Alkalosis  ELLA - improved, likely ATN/vascular congestion  baires removed 3/10, passed TOV    - Free water flushes 350ml q4 hr for hypernatremia    - Lasix 40mg PO BID (increased 3/13)  - monitor urine and electrolytes closely        Infectious disease  #leukocytosis  #Pneumonia   #cellulitis   #febrile   Leukocytosis, remains febrile post decannulation but c/o increased sputum  Repeat and follow up UA, sputum and blood cultures   Recently completed course of zosyn (2/26-3/9) to cover cellulitis/pneumonia  s/p cefepime, linezolid,  aztihro 2/25-2/26 and  vanco (2/26-3/1)   full RVP negativestrep pneumo Ag and urine legionella negative   s/p Mupiricon b/l nares for +MSSA  (2/26-3/3)  BAL cultures all with NGTD  ?penile meatus yellow discharge resolved, GC/chamydia and HIV negative     - rpt MRSA swab negative and Vancomycin discontinued   - c/w zosyn   - candidal intertrigo +/- cellulitis of the pannus - clotrimazole cream 1% BID 2/26 -+ abx as per above      Hematology/DVT ppx  #Anticoagulation   #Thrombocytopenia  #Anemia   #DVT  Hb stable, thrombocytopenia likely r/t shearing 2/2 ECMO circuit now improved off circuit   4T score low probability of HIT, received heparin at OSH, PF4 neg, SHAWANDA negative   LE duplex 2/25 neg    - c/w argatroban with goal 50-70 pending official VA duplex post decannulation   - VA duplex to upper and lower ext negative for DVT 3/9   - VA duplex on 3/14 - Acute, occlusive DVT noted within the R and L peroneal veins at both mid calves. Acute, non-occlusive DVTs noted within the R IJV. Superficial vein thrombosis noted within the R antecubital cephalic vein.  Age-indeterminate, occlusive DVTs within the L gastrocnemius vein at the proximal calf.  - Continue on Argatroban for now - eventual change to DOAC    Endocrine  #DM  A1c 6.7; obesity with metabolic syndrome  diabetes with steroid induced hyperglycemia, mild - ISS  s/p DEXA/ARDS dexamethasone protocol   pituitary workup largely unremarkable, steroids as above given recent weight gain but likely large component of volume    - c/w FS Q6h and low ISS and adjust if needed now off steroids     SKin/Lines  #Access  #Cellulitis   #DTI with skin sloughing  #Blisters  - L subclavian TLC inserted 2/25-3/8  - L radial artery line 2/25-3/11  - Right IJ and Right Fem ECMO cannulas 2/25-3/7  - candidal intertrigo +/- cellulitis of the pannus - clotrimazole cream 1% BID 2/26 -+ abx as per above  - DTI to right buttocks (sloughing of skin) and B/L feet Blisters    - podiatry following consulted for  b/l foot blisters- recs vascular consult and on 3/4 lanced bilateral foot serous blister with mild serous drainage now with wound care and f/u out patient   - Vascular consulted b/l blisters likely pressure related, recommending z flow boots for offloading  - No arterial disease and b/l 3 vessel runoff on VA duplex performed 3/5. No indication for further invasive or non-invasive studies    GOC   - full code   - palliative involved was following while on ECMO   - Mother, father involved in care are next of kin/surrogates and patient's girlfriend/partner is also involved but defers to parents 38 yo M w/ class III obesity, pAfib (no AC), HTN, BEA (on CPAP), history of b/l papilledema attributed to pseudotumor cerebri, who is transferred from [place] on 2/26 for VV ECMO 2/2 ARDS. Patient initially presented to [place] on 2/24 for SOB and b/l LE edema. As per chart review, patient endorses some degree of SOB and b/l LE edema x 3 months with significant weight gain. As per ICU team, patient had positive sick contacts with viral URI 1 to 2 weeks PTA and since then has had worsening SOB. Patient found to be reportedly hypoxemic to 70% on RA with worsening respiratory status/secretions and somnolence in the ED. Patient was a difficult intubation (~6 attempts). Despite optimal vent management, patient remained hypoxemic. Unable to prone due to obesity. Patient cannulated for VV ECMO on 2/25 and transferred to [facility] for further management. Patient now decannulated and s/p trach/PEG placed 3/7. Currently improving, alert and oriented weaning sedation. Transferred to RCU on 3/12.     Neuro  # Mental status   # pseudotumor cerebri  hx of pseudotumor cerebri s/p LP under fluoro in 2024 with high opening pressure with MRI 2024 also showed possible pituitary mass but unclear because of pressure effect from pseudotumor cerebri causing partially empty sella. Considering to start acetazolamide outpatient but never started  Prolactin high (30), defer checking cortisol axis given already received steroids but should be investigated, ACTH (<1.5) low but should be suppressed iso steroids  Concern for pituitary adenoma, however TSH .79 normal,  fT4 0.9 normal, and low T3 67  - Weaned off precedex, on clonidine 0.2mg q8hrs iso HTN and tachycardia  - currently on Seroquel 12.5 mg QHS (decreased 3/14)  - PT/OT following    # Neuropathy  - Trialing Gabapentin 100 TID for b/l LE neuropathy 3/13  - pt reports pain is improving     Cardiovascular  # HTN   # Atrial fibrillation   Hx of a. fib not on AC has not had any episodes during admission   TTE from 2024 did not demonstrate elevation of pulmonary pressures, but now with severe PH suggesting chronicity on TTE at    TTE on 2/25 showed LV EF 61%, enlarged RV with TAPSE 2.1cm, ePASP at least 49 (Patient had 10/2024 TTE with normal LV and RV with ePASP 14).  ROBERT 3/1 showing mild TR, enlarged RV with normal LV/RV function. PEEP increased to 24 without signs of RV dilation  troponins initially elevated, peaked at 162 however downtrended likely from RV dilation and strain - EKG neg for any typical ischemia  repeat TTE 3/11  Normal LV, EF 66 %, RV not well visualized. enlarged right ventricular cavity size, reduced right ventricular systolic function.  (TAPSE) is 1.6 cm. No evidence of pulmonary hypertension.    - amlodipine 10mg daily + lisinopril 5mg daily  - ECG daily to monitor QTc  as has been on higher end but will watch closely on Seroquel        Pulmonary  #Pulmonary HTN  #ARDS  #Multifocal Pneumonia   #BEA  #Trach-dependent   History of diagnosed BEA/?OHS intermittent compliance to home PAP  c/f acute on suspected chronic pulmonary hypertension in setting of possible BEA/OHS c/b pneumonia, ARDS, and pulmonary edema requiring difficult intubation on 2/25 and VVecmo 2/25-3/7. Now s/p tracheostomy 3/7 with weaning trials.   CTA chest on 2/24 negative for pulmonary embolism, notable for patchy bilateral lower lobe opacities, hepatomegaly, mild ascites, edema/induration of the abdominal pannus.  Full RVP and BAL cultures negative    s/p Dexamethasone followed DEXA/ARDS protocol (20mg x5 days then 10mg x5 days)    - C/w duoNeb q6 hours + 3% saline (reduced to BID on 3/13)  - c/w speaking valve   - S/p Flex bronchoscopy tracheostomy #6 portex proximal XLT, placed 3/7 by CT surgery in OR, CTsx to remove trach sutures -> 3/17  - CTsx to remove ECMO sutures ~10 days post decannulation ->  3/17      Gastrointestinal  #Diet - w/ peg   #Transaminitis - resolving  #constipation-resolved   s/p Endoscopic percutaneous gastrostomy LUQ PEG 3/7   tolerated tube feeds via PEG    elevated Tbili likely iso hemolysis 2/2 high ECMO flows, mild transaminitis now improving   hepatomegaly and mild ascites noted on CT A/P - no pocket to tap  RUQ ultrasound with steatosis      - c/w bowel regimen with Senna and Miralax flexiseal in place   - nutrition following   - SLP failed green dye test on 3/13, plan to reassess next week      Gu/Renal  #ELLA  #Hypernatremia   #Non-AG Metabolic Alkalosis  ELLA - improved, likely ATN/vascular congestion  baires removed 3/10, passed TOV    - Free water flushes 350ml q4 hr for hypernatremia    - Lasix 40mg PO BID (increased 3/13)  - monitor urine and electrolytes closely        Infectious disease  #leukocytosis  #Pneumonia   #cellulitis   #febrile   Leukocytosis, remains febrile post decannulation but c/o increased sputum  Repeat and follow up UA, sputum and blood cultures   Recently completed course of zosyn (2/26-3/9) to cover cellulitis/pneumonia  s/p cefepime, linezolid,  aztihro 2/25-2/26 and  vanco (2/26-3/1)   full RVP negativestrep pneumo Ag and urine legionella negative   s/p Mupiricon b/l nares for +MSSA  (2/26-3/3)  BAL cultures all with NGTD  ?penile meatus yellow discharge resolved, GC/chamydia and HIV negative   - rpt MRSA swab negative and Vancomycin discontinued   - c/w zosyn   - candidal intertrigo +/- cellulitis of the pannus - clotrimazole cream 1% BID 2/26 -+ abx as per above  - SCx (3/10) neg, UCx (3/12) neg, BCx (3/11) prelim: NGTD     Hematology/DVT ppx  #Anticoagulation   #Thrombocytopenia  #Anemia   #DVT  Hb stable, thrombocytopenia likely r/t shearing 2/2 ECMO circuit now improved off circuit   4T score low probability of HIT, received heparin at OSH, PF4 neg, SHAWANDA negative   LE duplex 2/25 neg    - c/w argatroban with goal 50-70 pending official VA duplex post decannulation   - VA duplex to upper and lower ext negative for DVT 3/9   - VA duplex on 3/14 - Acute, occlusive DVT noted within the R and L peroneal veins at both mid calves. Acute, non-occlusive DVTs noted within the R IJV. Superficial vein thrombosis noted within the R antecubital cephalic vein.  Age-indeterminate, occlusive DVTs within the L gastrocnemius vein at the proximal calf.  - Continue on Argatroban for now - eventual change to DOAC    Endocrine  #DM  A1c 6.7; obesity with metabolic syndrome  diabetes with steroid induced hyperglycemia, mild - ISS  s/p DEXA/ARDS dexamethasone protocol   pituitary workup largely unremarkable, steroids as above given recent weight gain but likely large component of volume    - c/w FS Q6h and low ISS and adjust if needed now off steroids     Skin/Lines  #Access  #Cellulitis   #DTI with skin sloughing  #Blisters  - L subclavian TLC inserted 2/25-3/8  - L radial artery line 2/25-3/11  - Right IJ and Right Fem ECMO cannulas 2/25-3/7  - candidal intertrigo +/- cellulitis of the pannus - clotrimazole cream 1% BID 2/26 -+ abx as per above  - DTI to right buttocks (sloughing of skin) and B/L feet Blisters    - podiatry following consulted for  b/l foot blisters- recs vascular consult and on 3/4 lanced bilateral foot serous blister with mild serous drainage now with wound care and f/u out patient   - Vascular consulted b/l blisters likely pressure related, recommending z flow boots for offloading  - No arterial disease and b/l 3 vessel runoff on VA duplex performed 3/5. No indication for further invasive or non-invasive studies    GOC   - full code   - palliative involved was following while on ECMO   - Mother, father involved in care are next of kin/surrogates and patient's girlfriend/partner is also involved but defers to parents

## 2025-03-15 NOTE — PROGRESS NOTE ADULT - NS ATTEND AMEND GEN_ALL_CORE FT
38 yo M w/ class III obesity, pAfib (no AC), HTN, BEA on CPAP, history of b/l papilledema attributed to pseudotumor cerebri who was transferred from [facility] on 2/26 for VV ECMO 2/2 ARDS.     TTE/ROBERT with RV failure and was diuresed. s/p treatment of pneumonia. Now s/p trach and peg. Patient decannulated 3/7.     Was tolerating TC ATC for days, VBG this morning with acute on chronic hypercapnic respiratory failure, 7.3/71 and was placed back to vent. Weaned to PS this morning. Seen sitting out of bed to chair.      # acute hypoxemic respiratory failure  # RV failure  # oropharyngeal dysphagia  # Critical illness myopathy  # afib  # DVT  - ARDS s/p VV ecmo. Currently trach dependent, C/W nebs/airway clearance.   - OOB, ambulate as tolerated, PT  - C/W oral diuresis. Will need repeat TTE later this admission.   - Failed S/S eval. Will consider repeat as he becomes stronger.   - C/W PS today - repeat blood gas this afternoon and hopefully can wean back to TC soon.   - C/W tube feeds via peg  - Fevers- unclear sourse, c/w zosyn. Maybe related to ECMO. BCx 3/11 neg, procal downtrending  - DVT noted on repeat duplex. C/W full ac   - C/W full a/c for now. Hx of afib, [id number] of 2  - DVT ppx- Full A/C  - Dispo- full code. Aggressive PT eval. OOB to chair.  d/w patient at length at bedside.

## 2025-03-16 LAB
ADD ON TEST-SPECIMEN IN LAB: SIGNIFICANT CHANGE UP
ANION GAP SERPL CALC-SCNC: 9 MMOL/L — SIGNIFICANT CHANGE UP (ref 7–14)
APTT BLD: 55.9 SEC — HIGH (ref 24.5–35.6)
B PERT DNA SPEC QL NAA+PROBE: SIGNIFICANT CHANGE UP
B PERT+PARAPERT DNA PNL SPEC NAA+PROBE: SIGNIFICANT CHANGE UP
BASE EXCESS BLDV CALC-SCNC: 9 MMOL/L — HIGH (ref -2–3)
BLD GP AB SCN SERPL QL: NEGATIVE — SIGNIFICANT CHANGE UP
BUN SERPL-MCNC: 11 MG/DL — SIGNIFICANT CHANGE UP (ref 7–23)
C PNEUM DNA SPEC QL NAA+PROBE: SIGNIFICANT CHANGE UP
CALCIUM SERPL-MCNC: 8.3 MG/DL — LOW (ref 8.4–10.5)
CHLORIDE SERPL-SCNC: 99 MMOL/L — SIGNIFICANT CHANGE UP (ref 98–107)
CO2 BLDV-SCNC: 38 MMOL/L — HIGH (ref 22–26)
CO2 SERPL-SCNC: 31 MMOL/L — SIGNIFICANT CHANGE UP (ref 22–31)
CREAT SERPL-MCNC: 0.68 MG/DL — SIGNIFICANT CHANGE UP (ref 0.5–1.3)
CULTURE RESULTS: SIGNIFICANT CHANGE UP
CULTURE RESULTS: SIGNIFICANT CHANGE UP
EGFR: 123 ML/MIN/1.73M2 — SIGNIFICANT CHANGE UP
EGFR: 123 ML/MIN/1.73M2 — SIGNIFICANT CHANGE UP
FLUAV AG NPH QL: SIGNIFICANT CHANGE UP
FLUAV SUBTYP SPEC NAA+PROBE: SIGNIFICANT CHANGE UP
FLUBV AG NPH QL: SIGNIFICANT CHANGE UP
FLUBV RNA SPEC QL NAA+PROBE: SIGNIFICANT CHANGE UP
GAS PNL BLDV: SIGNIFICANT CHANGE UP
GLUCOSE BLDC GLUCOMTR-MCNC: 110 MG/DL — HIGH (ref 70–99)
GLUCOSE BLDC GLUCOMTR-MCNC: 119 MG/DL — HIGH (ref 70–99)
GLUCOSE BLDC GLUCOMTR-MCNC: 124 MG/DL — HIGH (ref 70–99)
GLUCOSE BLDC GLUCOMTR-MCNC: 134 MG/DL — HIGH (ref 70–99)
GLUCOSE SERPL-MCNC: 110 MG/DL — HIGH (ref 70–99)
GRAM STN FLD: ABNORMAL
HADV DNA SPEC QL NAA+PROBE: SIGNIFICANT CHANGE UP
HCO3 BLDV-SCNC: 36 MMOL/L — HIGH (ref 22–29)
HCOV 229E RNA SPEC QL NAA+PROBE: SIGNIFICANT CHANGE UP
HCOV HKU1 RNA SPEC QL NAA+PROBE: SIGNIFICANT CHANGE UP
HCOV NL63 RNA SPEC QL NAA+PROBE: SIGNIFICANT CHANGE UP
HCOV OC43 RNA SPEC QL NAA+PROBE: SIGNIFICANT CHANGE UP
HCT VFR BLD CALC: 27.4 % — LOW (ref 39–50)
HGB BLD-MCNC: 7.9 G/DL — LOW (ref 13–17)
HMPV RNA SPEC QL NAA+PROBE: SIGNIFICANT CHANGE UP
HPIV1 RNA SPEC QL NAA+PROBE: SIGNIFICANT CHANGE UP
HPIV2 RNA SPEC QL NAA+PROBE: SIGNIFICANT CHANGE UP
HPIV3 RNA SPEC QL NAA+PROBE: SIGNIFICANT CHANGE UP
HPIV4 RNA SPEC QL NAA+PROBE: SIGNIFICANT CHANGE UP
M PNEUMO DNA SPEC QL NAA+PROBE: SIGNIFICANT CHANGE UP
MAGNESIUM SERPL-MCNC: 1.7 MG/DL — SIGNIFICANT CHANGE UP (ref 1.6–2.6)
MCHC RBC-ENTMCNC: 24.2 PG — LOW (ref 27–34)
MCHC RBC-ENTMCNC: 28.8 G/DL — LOW (ref 32–36)
MCV RBC AUTO: 84 FL — SIGNIFICANT CHANGE UP (ref 80–100)
NRBC # BLD AUTO: 0 K/UL — SIGNIFICANT CHANGE UP (ref 0–0)
NRBC # FLD: 0 K/UL — SIGNIFICANT CHANGE UP (ref 0–0)
NRBC BLD AUTO-RTO: 0 /100 WBCS — SIGNIFICANT CHANGE UP (ref 0–0)
PCO2 BLDV: 58 MMHG — HIGH (ref 42–55)
PH BLDV: 7.4 — SIGNIFICANT CHANGE UP (ref 7.32–7.43)
PHOSPHATE SERPL-MCNC: 3.4 MG/DL — SIGNIFICANT CHANGE UP (ref 2.5–4.5)
PLATELET # BLD AUTO: 201 K/UL — SIGNIFICANT CHANGE UP (ref 150–400)
PO2 BLDV: 60 MMHG — HIGH (ref 25–45)
POTASSIUM SERPL-MCNC: 3.9 MMOL/L — SIGNIFICANT CHANGE UP (ref 3.5–5.3)
POTASSIUM SERPL-SCNC: 3.9 MMOL/L — SIGNIFICANT CHANGE UP (ref 3.5–5.3)
RAPID RVP RESULT: SIGNIFICANT CHANGE UP
RBC # BLD: 3.26 M/UL — LOW (ref 4.2–5.8)
RBC # FLD: 23.9 % — HIGH (ref 10.3–14.5)
RH IG SCN BLD-IMP: POSITIVE — SIGNIFICANT CHANGE UP
RSV RNA NPH QL NAA+NON-PROBE: SIGNIFICANT CHANGE UP
RSV RNA SPEC QL NAA+PROBE: SIGNIFICANT CHANGE UP
RV+EV RNA SPEC QL NAA+PROBE: SIGNIFICANT CHANGE UP
SAO2 % BLDV: 94.7 % — HIGH (ref 67–88)
SARS-COV-2 RNA SPEC QL NAA+PROBE: SIGNIFICANT CHANGE UP
SARS-COV-2 RNA SPEC QL NAA+PROBE: SIGNIFICANT CHANGE UP
SODIUM SERPL-SCNC: 139 MMOL/L — SIGNIFICANT CHANGE UP (ref 135–145)
SPECIMEN SOURCE: SIGNIFICANT CHANGE UP
WBC # BLD: 14.36 K/UL — HIGH (ref 3.8–10.5)
WBC # FLD AUTO: 14.36 K/UL — HIGH (ref 3.8–10.5)

## 2025-03-16 PROCEDURE — 71045 X-RAY EXAM CHEST 1 VIEW: CPT | Mod: 26

## 2025-03-16 PROCEDURE — 99233 SBSQ HOSP IP/OBS HIGH 50: CPT

## 2025-03-16 RX ORDER — GABAPENTIN 400 MG/1
200 CAPSULE ORAL AT BEDTIME
Refills: 0 | Status: DISCONTINUED | OUTPATIENT
Start: 2025-03-16 | End: 2025-03-21

## 2025-03-16 RX ORDER — IBUPROFEN 200 MG
600 TABLET ORAL ONCE
Refills: 0 | Status: DISCONTINUED | OUTPATIENT
Start: 2025-03-16 | End: 2025-03-16

## 2025-03-16 RX ORDER — APIXABAN 2.5 MG/1
5 TABLET, FILM COATED ORAL EVERY 12 HOURS
Refills: 0 | Status: DISCONTINUED | OUTPATIENT
Start: 2025-03-16 | End: 2025-04-10

## 2025-03-16 RX ORDER — ACETAMINOPHEN 500 MG/5ML
1000 LIQUID (ML) ORAL ONCE
Refills: 0 | Status: COMPLETED | OUTPATIENT
Start: 2025-03-16 | End: 2025-03-16

## 2025-03-16 RX ORDER — HYDROMORPHONE/SOD CHLOR,ISO/PF 2 MG/10 ML
0.2 SYRINGE (ML) INJECTION ONCE
Refills: 0 | Status: DISCONTINUED | OUTPATIENT
Start: 2025-03-16 | End: 2025-03-16

## 2025-03-16 RX ORDER — IBUPROFEN 200 MG
600 TABLET ORAL ONCE
Refills: 0 | Status: COMPLETED | OUTPATIENT
Start: 2025-03-16 | End: 2025-03-16

## 2025-03-16 RX ORDER — MAGNESIUM SULFATE 500 MG/ML
1 SYRINGE (ML) INJECTION ONCE
Refills: 0 | Status: COMPLETED | OUTPATIENT
Start: 2025-03-16 | End: 2025-03-16

## 2025-03-16 RX ORDER — GABAPENTIN 400 MG/1
100 CAPSULE ORAL
Refills: 0 | Status: DISCONTINUED | OUTPATIENT
Start: 2025-03-16 | End: 2025-03-21

## 2025-03-16 RX ADMIN — Medication 4 MILLILITER(S): at 21:44

## 2025-03-16 RX ADMIN — IPRATROPIUM BROMIDE AND ALBUTEROL SULFATE 3 MILLILITER(S): .5; 2.5 SOLUTION RESPIRATORY (INHALATION) at 07:00

## 2025-03-16 RX ADMIN — CLOTRIMAZOLE 1 APPLICATION(S): 1 CREAM TOPICAL at 06:18

## 2025-03-16 RX ADMIN — GABAPENTIN 100 MILLIGRAM(S): 400 CAPSULE ORAL at 17:39

## 2025-03-16 RX ADMIN — Medication 500000 UNIT(S): at 23:41

## 2025-03-16 RX ADMIN — Medication 100 GRAM(S): at 14:21

## 2025-03-16 RX ADMIN — QUETIAPINE FUMARATE 12.5 MILLIGRAM(S): 25 TABLET ORAL at 21:04

## 2025-03-16 RX ADMIN — Medication 400 MILLIGRAM(S): at 08:47

## 2025-03-16 RX ADMIN — Medication 15 MILLILITER(S): at 06:15

## 2025-03-16 RX ADMIN — APIXABAN 5 MILLIGRAM(S): 2.5 TABLET, FILM COATED ORAL at 16:02

## 2025-03-16 RX ADMIN — Medication 15 MILLILITER(S): at 11:02

## 2025-03-16 RX ADMIN — Medication 500000 UNIT(S): at 06:17

## 2025-03-16 RX ADMIN — Medication 0.3 MILLIGRAM(S): at 21:01

## 2025-03-16 RX ADMIN — IPRATROPIUM BROMIDE AND ALBUTEROL SULFATE 3 MILLILITER(S): .5; 2.5 SOLUTION RESPIRATORY (INHALATION) at 21:44

## 2025-03-16 RX ADMIN — Medication 25 GRAM(S): at 16:01

## 2025-03-16 RX ADMIN — IPRATROPIUM BROMIDE AND ALBUTEROL SULFATE 3 MILLILITER(S): .5; 2.5 SOLUTION RESPIRATORY (INHALATION) at 12:45

## 2025-03-16 RX ADMIN — Medication 1 APPLICATION(S): at 06:13

## 2025-03-16 RX ADMIN — FUROSEMIDE 40 MILLIGRAM(S): 10 INJECTION INTRAMUSCULAR; INTRAVENOUS at 06:18

## 2025-03-16 RX ADMIN — AMLODIPINE BESYLATE 10 MILLIGRAM(S): 10 TABLET ORAL at 06:18

## 2025-03-16 RX ADMIN — Medication 25 GRAM(S): at 06:18

## 2025-03-16 RX ADMIN — Medication 500000 UNIT(S): at 17:36

## 2025-03-16 RX ADMIN — Medication 0.3 MILLIGRAM(S): at 17:34

## 2025-03-16 RX ADMIN — CLOTRIMAZOLE 1 APPLICATION(S): 1 CREAM TOPICAL at 17:35

## 2025-03-16 RX ADMIN — LISINOPRIL 5 MILLIGRAM(S): 5 TABLET ORAL at 11:01

## 2025-03-16 RX ADMIN — GABAPENTIN 100 MILLIGRAM(S): 400 CAPSULE ORAL at 06:17

## 2025-03-16 RX ADMIN — Medication 600 MILLIGRAM(S): at 01:44

## 2025-03-16 RX ADMIN — Medication 15 MILLILITER(S): at 17:36

## 2025-03-16 RX ADMIN — GABAPENTIN 100 MILLIGRAM(S): 400 CAPSULE ORAL at 14:20

## 2025-03-16 RX ADMIN — IPRATROPIUM BROMIDE AND ALBUTEROL SULFATE 3 MILLILITER(S): .5; 2.5 SOLUTION RESPIRATORY (INHALATION) at 03:52

## 2025-03-16 RX ADMIN — Medication 0.2 MILLIGRAM(S): at 16:48

## 2025-03-16 RX ADMIN — Medication 400 MILLIGRAM(S): at 15:30

## 2025-03-16 RX ADMIN — Medication 400 MILLIGRAM(S): at 23:19

## 2025-03-16 RX ADMIN — FUROSEMIDE 40 MILLIGRAM(S): 10 INJECTION INTRAMUSCULAR; INTRAVENOUS at 17:37

## 2025-03-16 RX ADMIN — Medication 600 MILLIGRAM(S): at 02:30

## 2025-03-16 RX ADMIN — Medication 4 MILLILITER(S): at 07:01

## 2025-03-16 RX ADMIN — Medication 600 MILLIGRAM(S): at 21:01

## 2025-03-16 RX ADMIN — Medication 0.2 MILLIGRAM(S): at 17:00

## 2025-03-16 RX ADMIN — Medication 40 MILLIGRAM(S): at 11:02

## 2025-03-16 RX ADMIN — ARGATROBAN 24.1 MICROGRAM(S)/KG/MIN: 100 INJECTION, SOLUTION INTRAVENOUS at 08:09

## 2025-03-16 RX ADMIN — GABAPENTIN 200 MILLIGRAM(S): 400 CAPSULE ORAL at 21:14

## 2025-03-16 RX ADMIN — Medication 25 GRAM(S): at 21:00

## 2025-03-16 RX ADMIN — Medication 0.2 MILLIGRAM(S): at 06:18

## 2025-03-16 RX ADMIN — Medication 500000 UNIT(S): at 11:02

## 2025-03-16 RX ADMIN — Medication 600 MILLIGRAM(S): at 21:31

## 2025-03-16 NOTE — PROGRESS NOTE ADULT - SUBJECTIVE AND OBJECTIVE BOX
RCU PROGRESS NOTE     CHIEF COMPLAINT: Patient is a 39y old  Male who presents with a chief complaint of sob (02 Mar 2025 06:19)      INTERVAL EVENTS:    REVIEW OF SYSTEMS: Seen by bedside during AM rounds and     Mode: CPAP with PS, FiO2: 40, PEEP: 6, PS: 12      OBJECTIVE:  ICU Vital Signs Last 24 Hrs  T(C): 38.4 (16 Mar 2025 08:15), Max: 39.1 (16 Mar 2025 00:00)  T(F): 101.2 (16 Mar 2025 08:15), Max: 102.3 (16 Mar 2025 00:00)  HR: 126 (16 Mar 2025 06:56) (121 - 139)  BP: 125/54 (16 Mar 2025 04:00) (112/60 - 145/72)  BP(mean): 73 (16 Mar 2025 04:00) (73 - 93)  ABP: --  ABP(mean): --  RR: 22 (16 Mar 2025 04:00) (20 - 28)  SpO2: 97% (16 Mar 2025 06:56) (97% - 100%)    O2 Parameters below as of 16 Mar 2025 06:56  Patient On (Oxygen Delivery Method): ventilator          Mode: CPAP with PS, FiO2: 40, PEEP: 6, PS: 12    03-15 @ 07:01  -  03-16 @ 07:00  --------------------------------------------------------  IN: 1450 mL / OUT: 1700 mL / NET: -250 mL      CAPILLARY BLOOD GLUCOSE      POCT Blood Glucose.: 110 mg/dL (16 Mar 2025 05:36)      HOSPITAL MEDICATIONS:  MEDICATIONS  (STANDING):  albuterol/ipratropium for Nebulization 3 milliLiter(s) Nebulizer every 6 hours  amLODIPine   Tablet 10 milliGRAM(s) Oral daily  argatroban Infusion 2 MICROgram(s)/kG/Min (24.1 mL/Hr) IV Continuous <Continuous>  chlorhexidine 0.12% Liquid 15 milliLiter(s) Swish and Spit two times a day  chlorhexidine 2% Cloths 1 Application(s) Topical <User Schedule>  cloNIDine 0.2 milliGRAM(s) Oral every 8 hours  clotrimazole 1% Cream 1 Application(s) Topical two times a day  dextrose 50% Injectable 25 Gram(s) IV Push once  dextrose 50% Injectable 12.5 Gram(s) IV Push once  dextrose 50% Injectable 25 Gram(s) IV Push once  furosemide Solution 40 milliGRAM(s) Oral every 12 hours  gabapentin Solution 100 milliGRAM(s) Oral three times a day  glucagon  Injectable 1 milliGRAM(s) IntraMuscular once  insulin lispro (ADMELOG) corrective regimen sliding scale   SubCutaneous every 6 hours  lisinopril 5 milliGRAM(s) Oral every 24 hours  multivitamin/minerals/iron Oral Solution (CENTRUM) 15 milliLiter(s) Oral daily  nystatin    Suspension 546201 Unit(s) Oral every 6 hours  pantoprazole   Suspension 40 milliGRAM(s) Oral daily  piperacillin/tazobactam IVPB.. 4.5 Gram(s) IV Intermittent every 8 hours  polyethylene glycol 3350 17 Gram(s) Oral daily  QUEtiapine 12.5 milliGRAM(s) Oral at bedtime  senna Syrup 5 milliLiter(s) Oral at bedtime  sodium chloride 3%  Inhalation 4 milliLiter(s) Inhalation every 12 hours    MEDICATIONS  (PRN):  acetaminophen   Oral Liquid .. 650 milliGRAM(s) Oral every 6 hours PRN Temp greater or equal to 38C (100.4F), Mild Pain (1 - 3), Moderate Pain (4 - 6)      PHYSICAL EXAMINATION    LABS:                        8.4    13.90 )-----------( 220      ( 15 Mar 2025 05:04 )             30.2     03-15    142  |  103  |  11  ----------------------------<  117[H]  3.9   |  31  |  0.66    Ca    8.2[L]      15 Mar 2025 05:04  Phos  3.1     03-15  Mg     1.80     03-15      PT/INR - ( 15 Mar 2025 05:04 )   PT: 22.2 sec;   INR: 1.88 ratio         PTT - ( 15 Mar 2025 05:04 )  PTT:50.9 sec  Urinalysis Basic - ( 15 Mar 2025 05:04 )    Color: x / Appearance: x / SG: x / pH: x  Gluc: 117 mg/dL / Ketone: x  / Bili: x / Urobili: x   Blood: x / Protein: x / Nitrite: x   Leuk Esterase: x / RBC: x / WBC x   Sq Epi: x / Non Sq Epi: x / Bacteria: x        Venous Blood Gas:  03-15 @ 18:30  7.42/52/59/34/92.0  VBG Lactate: --  Venous Blood Gas:  03-15 @ 05:04  7.30/71/76/35/96.7  VBG Lactate: 1.7      PAST MEDICAL & SURGICAL HISTORY:  HTN (hypertension)      Atrial fibrillation  paroxysmal      Papilledema of both eyes      Chronic sinusitis      Morbid obesity      No significant past surgical history          FAMILY HISTORY:      Social History:  sexually active with female partner occasionally (26 Feb 2025 01:48)      RADIOLOGY:  [ ] Reviewed and interpreted by me    PULMONARY FUNCTION TESTS:    EKG: RCU PROGRESS NOTE     CHIEF COMPLAINT: Patient is a 39y old  Male who presents with a chief complaint of sob (02 Mar 2025 06:19)      INTERVAL EVENTS:  - Persistent fevers overnight, however recent cx negative, and concern for precedex withdrawal.  Catapres increased to 0.3 TID and pending RPT infectious work up.   - Continued on TC QD and vent QHS   - Argatroban changed to eliquis     REVIEW OF SYSTEMS: Seen by bedside during AM rounds and denies pain or SOB    Mode: CPAP with PS, FiO2: 40, PEEP: 6, PS: 12      OBJECTIVE:  ICU Vital Signs Last 24 Hrs  T(C): 38.4 (16 Mar 2025 08:15), Max: 39.1 (16 Mar 2025 00:00)  T(F): 101.2 (16 Mar 2025 08:15), Max: 102.3 (16 Mar 2025 00:00)  HR: 126 (16 Mar 2025 06:56) (121 - 139)  BP: 125/54 (16 Mar 2025 04:00) (112/60 - 145/72)  BP(mean): 73 (16 Mar 2025 04:00) (73 - 93)  ABP: --  ABP(mean): --  RR: 22 (16 Mar 2025 04:00) (20 - 28)  SpO2: 97% (16 Mar 2025 06:56) (97% - 100%)    O2 Parameters below as of 16 Mar 2025 06:56  Patient On (Oxygen Delivery Method): ventilator          Mode: CPAP with PS, FiO2: 40, PEEP: 6, PS: 12    03-15 @ 07:01  -  03-16 @ 07:00  --------------------------------------------------------  IN: 1450 mL / OUT: 1700 mL / NET: -250 mL      CAPILLARY BLOOD GLUCOSE  POCT Blood Glucose.: 110 mg/dL (16 Mar 2025 05:36)      HOSPITAL MEDICATIONS:  MEDICATIONS  (STANDING):  albuterol/ipratropium for Nebulization 3 milliLiter(s) Nebulizer every 6 hours  amLODIPine   Tablet 10 milliGRAM(s) Oral daily  argatroban Infusion 2 MICROgram(s)/kG/Min (24.1 mL/Hr) IV Continuous <Continuous>  chlorhexidine 0.12% Liquid 15 milliLiter(s) Swish and Spit two times a day  chlorhexidine 2% Cloths 1 Application(s) Topical <User Schedule>  cloNIDine 0.2 milliGRAM(s) Oral every 8 hours  clotrimazole 1% Cream 1 Application(s) Topical two times a day  dextrose 50% Injectable 25 Gram(s) IV Push once  dextrose 50% Injectable 12.5 Gram(s) IV Push once  dextrose 50% Injectable 25 Gram(s) IV Push once  furosemide Solution 40 milliGRAM(s) Oral every 12 hours  gabapentin Solution 100 milliGRAM(s) Oral three times a day  glucagon  Injectable 1 milliGRAM(s) IntraMuscular once  insulin lispro (ADMELOG) corrective regimen sliding scale   SubCutaneous every 6 hours  lisinopril 5 milliGRAM(s) Oral every 24 hours  multivitamin/minerals/iron Oral Solution (CENTRUM) 15 milliLiter(s) Oral daily  nystatin    Suspension 831031 Unit(s) Oral every 6 hours  pantoprazole   Suspension 40 milliGRAM(s) Oral daily  piperacillin/tazobactam IVPB.. 4.5 Gram(s) IV Intermittent every 8 hours  polyethylene glycol 3350 17 Gram(s) Oral daily  QUEtiapine 12.5 milliGRAM(s) Oral at bedtime  senna Syrup 5 milliLiter(s) Oral at bedtime  sodium chloride 3%  Inhalation 4 milliLiter(s) Inhalation every 12 hours    MEDICATIONS  (PRN):  acetaminophen   Oral Liquid .. 650 milliGRAM(s) Oral every 6 hours PRN Temp greater or equal to 38C (100.4F), Mild Pain (1 - 3), Moderate Pain (4 - 6)      PHYSICAL EXAMINATION  General: NAD and morbidly obese   HEENT: Trach present and able to tolerate PMV well with strong cough and able to cough up clear secretions into mouth   Cards: S1/S2, no murmurs   Pulm: CTA bilaterally. No wheezes.   Abdomen: Soft, nondistended and nontender. PEG (+)   Extremities: No pedal edema. ISI of BL upper and lower extremities with some weakness.  Neurology: Awake and alert with no acute focal neurological deficits     LABS:                        8.4    13.90 )-----------( 220      ( 15 Mar 2025 05:04 )             30.2     03-15    142  |  103  |  11  ----------------------------<  117[H]  3.9   |  31  |  0.66    Ca    8.2[L]      15 Mar 2025 05:04  Phos  3.1     03-15  Mg     1.80     03-15      PT/INR - ( 15 Mar 2025 05:04 )   PT: 22.2 sec;   INR: 1.88 ratio         PTT - ( 15 Mar 2025 05:04 )  PTT:50.9 sec  Urinalysis Basic - ( 15 Mar 2025 05:04 )    Color: x / Appearance: x / SG: x / pH: x  Gluc: 117 mg/dL / Ketone: x  / Bili: x / Urobili: x   Blood: x / Protein: x / Nitrite: x   Leuk Esterase: x / RBC: x / WBC x   Sq Epi: x / Non Sq Epi: x / Bacteria: x        Venous Blood Gas:  03-15 @ 18:30  7.42/52/59/34/92.0  VBG Lactate: --  Venous Blood Gas:  03-15 @ 05:04  7.30/71/76/35/96.7  VBG Lactate: 1.7      PAST MEDICAL & SURGICAL HISTORY:  HTN (hypertension)      Atrial fibrillation  paroxysmal      Papilledema of both eyes      Chronic sinusitis      Morbid obesity      No significant past surgical history          FAMILY HISTORY:      Social History:  sexually active with female partner occasionally (26 Feb 2025 01:48)      RADIOLOGY:  [ ] Reviewed and interpreted by me    PULMONARY FUNCTION TESTS:    EKG: RCU PROGRESS NOTE     CHIEF COMPLAINT: Patient is a 39y old  Male who presents with a chief complaint of sob (02 Mar 2025 06:19)      INTERVAL EVENTS:  - Persistent fevers overnight, however recent cx negative, and concern for precedex withdrawal vs infxn vs post ECMO.  Catapres increased to 0.3 TID and pending RPT infectious work up.   - Continued on TC QD and vent QHS   - Argatroban changed to eliquis     REVIEW OF SYSTEMS: Seen by bedside during AM rounds and denies pain or SOB    Mode: CPAP with PS, FiO2: 40, PEEP: 6, PS: 12      OBJECTIVE:  ICU Vital Signs Last 24 Hrs  T(C): 38.4 (16 Mar 2025 08:15), Max: 39.1 (16 Mar 2025 00:00)  T(F): 101.2 (16 Mar 2025 08:15), Max: 102.3 (16 Mar 2025 00:00)  HR: 126 (16 Mar 2025 06:56) (121 - 139)  BP: 125/54 (16 Mar 2025 04:00) (112/60 - 145/72)  BP(mean): 73 (16 Mar 2025 04:00) (73 - 93)  ABP: --  ABP(mean): --  RR: 22 (16 Mar 2025 04:00) (20 - 28)  SpO2: 97% (16 Mar 2025 06:56) (97% - 100%)    O2 Parameters below as of 16 Mar 2025 06:56  Patient On (Oxygen Delivery Method): ventilator          Mode: CPAP with PS, FiO2: 40, PEEP: 6, PS: 12    03-15 @ 07:01  -  03-16 @ 07:00  --------------------------------------------------------  IN: 1450 mL / OUT: 1700 mL / NET: -250 mL      CAPILLARY BLOOD GLUCOSE  POCT Blood Glucose.: 110 mg/dL (16 Mar 2025 05:36)      HOSPITAL MEDICATIONS:  MEDICATIONS  (STANDING):  albuterol/ipratropium for Nebulization 3 milliLiter(s) Nebulizer every 6 hours  amLODIPine   Tablet 10 milliGRAM(s) Oral daily  argatroban Infusion 2 MICROgram(s)/kG/Min (24.1 mL/Hr) IV Continuous <Continuous>  chlorhexidine 0.12% Liquid 15 milliLiter(s) Swish and Spit two times a day  chlorhexidine 2% Cloths 1 Application(s) Topical <User Schedule>  cloNIDine 0.2 milliGRAM(s) Oral every 8 hours  clotrimazole 1% Cream 1 Application(s) Topical two times a day  dextrose 50% Injectable 25 Gram(s) IV Push once  dextrose 50% Injectable 12.5 Gram(s) IV Push once  dextrose 50% Injectable 25 Gram(s) IV Push once  furosemide Solution 40 milliGRAM(s) Oral every 12 hours  gabapentin Solution 100 milliGRAM(s) Oral three times a day  glucagon  Injectable 1 milliGRAM(s) IntraMuscular once  insulin lispro (ADMELOG) corrective regimen sliding scale   SubCutaneous every 6 hours  lisinopril 5 milliGRAM(s) Oral every 24 hours  multivitamin/minerals/iron Oral Solution (CENTRUM) 15 milliLiter(s) Oral daily  nystatin    Suspension 314162 Unit(s) Oral every 6 hours  pantoprazole   Suspension 40 milliGRAM(s) Oral daily  piperacillin/tazobactam IVPB.. 4.5 Gram(s) IV Intermittent every 8 hours  polyethylene glycol 3350 17 Gram(s) Oral daily  QUEtiapine 12.5 milliGRAM(s) Oral at bedtime  senna Syrup 5 milliLiter(s) Oral at bedtime  sodium chloride 3%  Inhalation 4 milliLiter(s) Inhalation every 12 hours    MEDICATIONS  (PRN):  acetaminophen   Oral Liquid .. 650 milliGRAM(s) Oral every 6 hours PRN Temp greater or equal to 38C (100.4F), Mild Pain (1 - 3), Moderate Pain (4 - 6)      PHYSICAL EXAMINATION  General: NAD and morbidly obese   HEENT: Trach present and able to tolerate PMV well with strong cough and able to cough up clear secretions into mouth   Cards: S1/S2, no murmurs   Pulm: CTA bilaterally. No wheezes.   Abdomen: Soft, nondistended and nontender. PEG (+)   Extremities: No pedal edema. SII of BL upper and lower extremities with some weakness.  Neurology: Awake and alert with no acute focal neurological deficits     LABS:                        8.4    13.90 )-----------( 220      ( 15 Mar 2025 05:04 )             30.2     03-15    142  |  103  |  11  ----------------------------<  117[H]  3.9   |  31  |  0.66    Ca    8.2[L]      15 Mar 2025 05:04  Phos  3.1     03-15  Mg     1.80     03-15      PT/INR - ( 15 Mar 2025 05:04 )   PT: 22.2 sec;   INR: 1.88 ratio         PTT - ( 15 Mar 2025 05:04 )  PTT:50.9 sec  Urinalysis Basic - ( 15 Mar 2025 05:04 )    Color: x / Appearance: x / SG: x / pH: x  Gluc: 117 mg/dL / Ketone: x  / Bili: x / Urobili: x   Blood: x / Protein: x / Nitrite: x   Leuk Esterase: x / RBC: x / WBC x   Sq Epi: x / Non Sq Epi: x / Bacteria: x        Venous Blood Gas:  03-15 @ 18:30  7.42/52/59/34/92.0  VBG Lactate: --  Venous Blood Gas:  03-15 @ 05:04  7.30/71/76/35/96.7  VBG Lactate: 1.7      PAST MEDICAL & SURGICAL HISTORY:  HTN (hypertension)      Atrial fibrillation  paroxysmal      Papilledema of both eyes      Chronic sinusitis      Morbid obesity      No significant past surgical history          FAMILY HISTORY:      Social History:  sexually active with female partner occasionally (26 Feb 2025 01:48)      RADIOLOGY:  [ ] Reviewed and interpreted by me    PULMONARY FUNCTION TESTS:    EKG:

## 2025-03-16 NOTE — PROGRESS NOTE ADULT - NS ATTEND AMEND GEN_ALL_CORE FT
40 yo M w/ class III obesity, pAfib (no AC), HTN, BEA on CPAP, history of b/l papilledema attributed to pseudotumor cerebri who was transferred from [facility] on 2/26 for VV ECMO 2/2 ARDS.     TTE/ROBERT with RV failure and was diuresed. s/p treatment of pneumonia. Now s/p trach and peg. ECMO catheter decannulated 3/7.     Was tolerating TC ATC for days, VBG on 3/15 overnight with acute on chronic hypercapnic respiratory failure, 7.3/71 and was placed back to vent. Weaned to PS this morning.   Overnight, febrile again to 101. Patient without complaints, tolerated sitting in the chair most of the day yesterday and asking to be placed back to TC, phonating over vent.     # acute hypoxemic respiratory failure  # RV failure  # oropharyngeal dysphagia  # Critical illness myopathy  # afib  # DVT  - ARDS s/p VV ecmo. Currently trach dependent, C/W nebs/airway clearance. wean to TC and use PMV for communication.   - OOB, ambulate as tolerated, PT  - C/W oral diuresis. Will need repeat TTE later this admission.   - Failed S/S eval. Will consider repeat as he becomes stronger.   - C/W PS today - repeat blood gas this afternoon and hopefully can wean back to TC soon.   - C/W tube feeds via peg  - Fevers- unclear sourse, c/w zosyn. Maybe related to ECMO (though decannulated on 3/7). BCx 3/11 neg, procal downtrending. Will increase Clonidine.   - DVT noted on repeat duplex. C/W full ac- switch from Argatroban to Eliquis   - Hx of afib, [id number] of 2. c/w full AC  - Dispo- full code. PT. OOB to chair.  d/w patient and father at length at bedside.

## 2025-03-16 NOTE — PROGRESS NOTE ADULT - ASSESSMENT
40 YO M with PMHx of morbid obesity, BEA on CPAP, pAFIB (not on AC), HTN, and BL papilledema attributed to pseudotumor cerebri who presented initially to [facility] on 2/24 with SOB and BL LE edema. Found with URI outpatient with progressive SOB, and concern for noted for MFPNA on imaging. Course progressed with AHRF with worsening O2 demand, respiratory distress, secretions, and somnolence requiring intubation (difficult with success after 6 attempts) in ED and admission to [facility] MICU. While in MICU, patient noted with increasing O2 demand with no improvement despite optimal vent management. Concern noted for progressive ARDS, unable to prone given morbid obesity, and ultimately cannulated for vvECMO on 2/25 and transferred to [facility] for further management on 2/26. While at [facility], tx with diuresis and ABX, and ultimately decannulated and s/p 60XLTCP trach and PEG on 3/7. Patient catie transferred to RCU on 3/11 and course complicated by recurrent high grade fevers.     NEUROLOGY  # Hx of Pseudotumor Cerebri   - s/p LP in 2024 with high opening pressure  - s/p MRI 2024 with possible pituitary mass but unclear because of pressure effect from pseudotumor cerebri causing partially empty sella.  - Prolactin elevated   - ACTH low but recieved steroids during admission   - FT4 low normal and TSH normal, but given FT4 low normal TSH should be higher   - Discuss endocrine consult for inhouse work up vs outpatient.     # Sedation   - Weaned off sedation in ICU   - Nimbex turned off 2/27   - Precedex turned off 3/11  - Prop turned off 3/9   - Continue on catapres 0.2 TID (3/11 - )   - Continue on seroquel 12.5 QHS     # BL LE neuropathy   - CIPN concern   - Continue on neurontin 100 TID   - Monitor pain    CARDIOVASCULAR  # HTN   - HTN noted in ICU requiring cardene and esmolol GTTs   - Continue on norvasc 10, lisinopril 5 and catapres 0.2 TID   - Monitor BP     # AFIB   - Hx of pAFIB   - No RVR episodes or pAFIB episodes in ICU   - No rate control medications needed  - Monitor on telemetry     # RV dilation second to PHTN   - POCUS on arrival to [facility] with RVE concern   - TTE 10/2024 with EF 55-60 with normal LVSF, moderate LVH and normal RVSF and size with no concern for pHTN   - TTE on 2/25 at  with EF 61 and normal LVSF, but enlarged RV size with reduced RVSF, mild TR, mild PHTN with PASP 49, and dilated IVC to 3.4cn, but normal TAPSE 2.1.  - Continue on aggressive diuresis in ICU    - TTE 3/11 with RVSF reduced with TAPSE 1.6. IVC improved to 2.12cm.   - Continue on lasix 40 PO BID   - Monitor IO    RESPIRATORY  # ARDS second to MFPNA   - Hx of BEA on CPAP presented to  post URI with SOB and found with AHRF with progression to ARDS second to post viral MFPNA   - Intubated 2/25   - Cannulated for vvECMO 2/26   - s/p 60XLTCP tracheostomy 3/7   - Decannulated 3/7   - Continue on TC QD and PS 12/5 QHS vent QHS   - Continue on nebs and HTS   - CTSx to remove tracheostomy and ECMO sutures on 3/17     GI  # Dysphagia   - s/p PEG 3/7   - Attempted green dye on 3/13 and failed  - Continue on TF  - Continue on miralax and senna  - Reattempt green dye next week     # Mild transaminitis   - LFTs elevated in ICU with TBILI elevated likely from ECMO hemolysis   - US ABD with hepatic steatosis   - Trend LFTs      RENAL  # ELLA   - ELLA likely from cardiorenal with RVE   - Diuresed in ICU and improved   - Monitor renal function and UOP     INFECTIOUS DISEASE  # Recurrent fevers   - Recurrent fevers post ECMO decannulation thought initially to be cytokine surge   - BCx, SCx, UCx, RVP and MRSA RPT negative   - Despite negative cx, WBC rising, and zosyn resumed (3/12 - )   - Despite zosyn fevers continued likely from DVTs vs sedation wean?     # MFPNA and abdominal pannus cellulitis   - Presented to  post URI with SOB and found with AHRF with progression to ARDS second to post viral MFPNA   - RVP, legionella, strept, BAL, BCx, UCx, HIV, PCP, and adenovirus PCR negative   - Initially started on multiple agents in ICU and ultimatelty completed zosyn (2/26-3/9) ZAMX (2/25-2/26) and vanco (2/26-3/1)     # Penile discharge   - GC/ chlamydia negative   - HIV negative   - Monitor for now    # PPX   - MRSA PCR positive and completed bactroban (2/26-3/3)     HEME   # Anemia and thrombocytopenia   - Likely second to shearing with ECMO circuit   - Monitor HH and PLTs post decannulation and on agratroban     VASCULAR   # R IJ and BL LE DVTs   - Post ECMO dopplers on 3/9 negative for BL UE/ LE DVTs.   - Subsequent post ECMO dopplers performed on 3/14 and noted with acute, non-occlusive deep vein thrombosis noted within the right internal jugular vein. acute, occlusive deep vein thromboses noted within the right and left peroneal veins at both mid calves, and age-indeterminate, occlusive deep vein thrombosis visualized within the left gastrocnemius vein at the proximal calf.     - Continue on argatroban GTT   - Eventual plan to convert to NOAC    ENDOCRINE  # Pre-DM2   - A1C 6.7  - Continue on ISS   - Monitor FS   - IF no demand then stop ISS     LINES/ TUBES  - R IJ and R FEM ECMO (2/26-3/7)     SKIN  # Pannus canndial intertrigo   - Continue on clotrimazole cream   - WOC appreciated     ETHICS/ GOC    - FULL CODE     DISPO - TBD   38 YO M with PMHx of morbid obesity, BEA on CPAP, pAFIB (not on AC), HTN, and BL papilledema attributed to pseudotumor cerebri who presented initially to [facility] on 2/24 with SOB and BL LE edema. Found with URI outpatient with progressive SOB, and concern for noted for MFPNA on imaging. Course progressed with AHRF with worsening O2 demand, respiratory distress, secretions, and somnolence requiring intubation (difficult with success after 6 attempts) in ED and admission to [facility] MICU. While in MICU, patient noted with increasing O2 demand with no improvement despite optimal vent management. Concern noted for progressive ARDS, unable to prone given morbid obesity, and ultimately cannulated for vvECMO on 2/25 and transferred to [facility] for further management on 2/26. While at [facility], tx with diuresis and ABX, and ultimately decannulated and s/p 60XLTCP trach and PEG on 3/7. Patient catie transferred to RCU on 3/11 and course complicated by recurrent high grade fevers.     NEUROLOGY  # Hx of Pseudotumor Cerebri   - s/p LP in 2024 with high opening pressure  - s/p MRI 2024 with possible pituitary mass but unclear because of pressure effect from pseudotumor cerebri causing partially empty sella.  - Prolactin elevated   - ACTH low but recieved steroids during admission   - FT4 low normal and TSH normal, but given FT4 low normal TSH should be higher   - Discuss endocrine consult for inhouse work up vs outpatient.     # Sedation   - Weaned off sedation in ICU   - Nimbex turned off 2/27   - Prop turned off 3/9   - Precedex turned off and catapres started 3/11, however persistent fever spikes noted with concern for precedex withdrawal?   - Continue on catapres 0.3 TID (3/16 - )   - Continue on seroquel 12.5 QHS     # BL LE neuropathy   - CIPN concern   - Continue on neurontin 100 BID and 200 QHS  - Monitor pain    CARDIOVASCULAR  # HTN   - HTN noted in ICU requiring cardene and esmolol GTTs   - Continue on norvasc 10, lisinopril 5 and catapres 0.2 TID   - Monitor BP     # AFIB   - Hx of pAFIB   - No RVR episodes or pAFIB episodes in ICU   - No rate control medications needed  - Monitor on telemetry     # RV dilation second to PHTN   - POCUS on arrival to [facility] with RVE concern   - TTE 10/2024 with EF 55-60 with normal LVSF, moderate LVH and normal RVSF and size with no concern for pHTN   - TTE on 2/25 at  with EF 61 and normal LVSF, but enlarged RV size with reduced RVSF, mild TR, mild PHTN with PASP 49, and dilated IVC to 3.4cn, but normal TAPSE 2.1.  - Continue on aggressive diuresis in ICU    - TTE 3/11 with RVSF reduced with TAPSE 1.6. IVC improved to 2.12cm.   - Continue on lasix 40 PO BID   - Monitor IO    RESPIRATORY  # ARDS second to MFPNA   - Hx of BEA on CPAP presented to  post URI with SOB and found with AHRF with progression to ARDS second to post viral MFPNA   - Intubated 2/25   - Cannulated for vvECMO 2/26   - s/p 60XLTCP tracheostomy 3/7   - Decannulated 3/7   - Continue on TC QD and PS 12/5 QHS vent QHS   - Continue on nebs and HTS   - CTSx to remove tracheostomy and ECMO sutures on 3/17     GI  # Dysphagia   - s/p PEG 3/7   - Attempted green dye on 3/13 and failed  - Continue on TF  - Continue on miralax and senna  - Reattempt green dye next week     # Mild transaminitis   - LFTs elevated in ICU with TBILI elevated likely from ECMO hemolysis   - US ABD with hepatic steatosis   - Trend LFTs      RENAL  # ELLA   - ELLA likely from cardiorenal with RVE   - Diuresed in ICU and improved   - Monitor renal function and UOP     INFECTIOUS DISEASE  # Recurrent fevers   - Recurrent fevers post ECMO decannulation thought initially to be cytokine surge   - BCx, SCx, UCx, RVP and MRSA RPT on 3/12 negative   - Despite negative cx, WBC rising, and zosyn resumed (3/12 - ) with improved WBC , however recurrent fevers noted.   - Check RPT PCT, BCx, SCx, UA, RVP and CXR .      # MFPNA and abdominal pannus cellulitis   - Presented to  post URI with SOB and found with AHRF with progression to ARDS second to post viral MFPNA   - RVP, legionella, strept, BAL, BCx, UCx, HIV, PCP, and adenovirus PCR negative   - Initially started on multiple agents in ICU and ultimatelty completed zosyn (2/26-3/9) ZAMX (2/25-2/26) and vanco (2/26-3/1)     # Penile discharge   - GC/ chlamydia negative   - HIV negative   - Monitor for now    # PPX   - MRSA PCR positive and completed bactroban (2/26-3/3)     HEME   # Anemia and thrombocytopenia   - Likely second to shearing with ECMO circuit   - Monitor HH and PLTs post decannulation and on agratroban     VASCULAR   # R IJ and BL LE DVTs   - Post ECMO dopplers on 3/9 negative for BL UE/ LE DVTs.   - Subsequent post ECMO dopplers performed on 3/14 and noted with acute, non-occlusive deep vein thrombosis noted within the right internal jugular vein. acute, occlusive deep vein thromboses noted within the right and left peroneal veins at both mid calves, and age-indeterminate, occlusive deep vein thrombosis visualized within the left gastrocnemius vein at the proximal calf.     - Continue on argatroban GTT and change to eliquis today     ENDOCRINE  # Pre-DM2   - A1C 6.7  - Continue on ISS   - Monitor FS   - IF no demand then stop ISS     LINES/ TUBES  - R IJ and R FEM ECMO (2/26-3/7)     SKIN  # Pannus canndial intertrigo   - Continue on clotrimazole cream   - WOC appreciated     ETHICS/ GOC    - FULL CODE     DISPO - PT and PMR pending   38 YO M with PMHx of morbid obesity, BEA on CPAP, pAFIB (not on AC), HTN, and BL papilledema attributed to pseudotumor cerebri who presented initially to [facility] on 2/24 with SOB and BL LE edema. Found with URI outpatient with progressive SOB, and concern for noted for MFPNA on imaging. Course progressed with AHRF with worsening O2 demand, respiratory distress, secretions, and somnolence requiring intubation (difficult with success after 6 attempts) in ED and admission to [facility] MICU. While in MICU, patient noted with increasing O2 demand with no improvement despite optimal vent management. Concern noted for progressive ARDS, unable to prone given morbid obesity, and ultimately cannulated for vvECMO on 2/25 and transferred to [facility] for further management on 2/26. While at [facility], tx with diuresis and ABX, and ultimately decannulated and s/p 60XLTCP trach and PEG on 3/7. Patient catie transferred to RCU on 3/11 and course complicated by recurrent high grade fevers.     NEUROLOGY  # Hx of Pseudotumor Cerebri   - s/p LP in 2024 with high opening pressure  - s/p MRI 2024 with possible pituitary mass but unclear because of pressure effect from pseudotumor cerebri causing partially empty sella.  - Prolactin elevated   - ACTH low but recieved steroids during admission   - FT4 low normal and TSH normal, but given FT4 low normal TSH should be higher   - Discuss endocrine consult for inhouse work up vs outpatient.     # Sedation   - Weaned off sedation in ICU   - Nimbex turned off 2/27   - Prop turned off 3/9   - Precedex turned off and catapres started 3/11, however persistent fever spikes noted with concern for precedex withdrawal?   - Continue on catapres 0.3 TID (3/16 - )   - Continue on seroquel 12.5 QHS     # BL LE neuropathy   - CIPN concern   - Continue on neurontin 100 BID and 200 QHS  - Monitor pain    CARDIOVASCULAR  # HTN   - HTN noted in ICU requiring cardene and esmolol GTTs   - Lisinopril dc'ed to increase catapres   - Continue on norvasc 10 and catapres 0.2 TID   - Monitor BP     # AFIB   - Hx of pAFIB   - No RVR episodes or pAFIB episodes in ICU   - No rate control medications needed  - Monitor on telemetry     # RV dilation second to PHTN   - POCUS on arrival to [facility] with RVE concern   - TTE 10/2024 with EF 55-60 with normal LVSF, moderate LVH and normal RVSF and size with no concern for pHTN   - TTE on 2/25 at  with EF 61 and normal LVSF, but enlarged RV size with reduced RVSF, mild TR, mild PHTN with PASP 49, and dilated IVC to 3.4cn, but normal TAPSE 2.1.  - Continue on aggressive diuresis in ICU    - TTE 3/11 with RVSF reduced with TAPSE 1.6. IVC improved to 2.12cm.   - Continue on lasix 40 PO BID   - Monitor IO    RESPIRATORY  # ARDS second to MFPNA   - Hx of BEA on CPAP presented to  post URI with SOB and found with AHRF with progression to ARDS second to post viral MFPNA   - Intubated 2/25   - Cannulated for vvECMO 2/26   - s/p 60XLTCP tracheostomy 3/7   - Decannulated 3/7   - Continue on TC QD with PMV as able with strong phonation  - Continue on PS 12/5 QHS   - Continue on nebs and HTS   - CTSx to remove tracheostomy and ECMO sutures on 3/17     GI  # Dysphagia   - s/p PEG 3/7   - Attempted green dye on 3/13 and failed  - Continue on TF  - Continue on miralax and senna  - Reattempt green dye next week     # Mild transaminitis   - LFTs elevated in ICU with TBILI elevated likely from ECMO hemolysis   - US ABD with hepatic steatosis   - Trend LFTs      RENAL  # ELLA   - ELLA likely from cardiorenal with RVE   - Diuresed in ICU and improved   - Monitor renal function and UOP     INFECTIOUS DISEASE  # Recurrent fevers   - Recurrent fevers post ECMO decannulation thought initially to be cytokine surge   - BCx, SCx, UCx, RVP and MRSA RPT on 3/12 negative   - Despite negative cx, WBC rising, and zosyn resumed (3/12 - ) with improved WBC , however recurrent fevers noted.   - Check RPT PCT, BCx, SCx, UA, RVP and CXR .      # MFPNA and abdominal pannus cellulitis   - Presented to  post URI with SOB and found with AHRF with progression to ARDS second to post viral MFPNA   - RVP, legionella, strept, BAL, BCx, UCx, HIV, PCP, and adenovirus PCR negative   - Initially started on multiple agents in ICU and ultimatelty completed zosyn (2/26-3/9) ZAMX (2/25-2/26) and vanco (2/26-3/1)     # Penile discharge   - GC/ chlamydia negative   - HIV negative   - Monitor for now    # PPX   - MRSA PCR positive and completed bactroban (2/26-3/3)     HEME   # Anemia and thrombocytopenia   - Likely second to shearing with ECMO circuit   - Monitor HH and PLTs post decannulation and on agratroban     VASCULAR   # R IJ and BL LE DVTs   - Post ECMO dopplers on 3/9 negative for BL UE/ LE DVTs.   - Subsequent post ECMO dopplers performed on 3/14 and noted with acute, non-occlusive deep vein thrombosis noted within the right internal jugular vein. acute, occlusive deep vein thromboses noted within the right and left peroneal veins at both mid calves, and age-indeterminate, occlusive deep vein thrombosis visualized within the left gastrocnemius vein at the proximal calf.     - Continue on argatroban GTT and change to eliquis today     ENDOCRINE  # Pre-DM2   - A1C 6.7  - Continue on ISS   - Monitor FS   - IF no demand then stop ISS     LINES/ TUBES  - R IJ and R FEM ECMO (2/26-3/7)     SKIN  # Pannus canndial intertrigo   - Continue on clotrimazole cream   - WOC appreciated     ETHICS/ GOC    - FULL CODE     DISPO - PT and PMR pending   40 YO M with PMHx of morbid obesity, BEA on CPAP, pAFIB (not on AC), HTN, and BL papilledema attributed to pseudotumor cerebri who presented initially to [facility] on 2/24 with SOB and BL LE edema. Found with URI outpatient with progressive SOB, and concern for noted for MFPNA on imaging. Course progressed with AHRF with worsening O2 demand, respiratory distress, secretions, and somnolence requiring intubation (difficult with success after 6 attempts) in ED and admission to [facility] MICU. While in MICU, patient noted with increasing O2 demand with no improvement despite optimal vent management. Concern noted for progressive ARDS, unable to prone given morbid obesity, and ultimately cannulated for vvECMO on 2/25 and transferred to [facility] for further management on 2/26. While at [facility], tx with diuresis and ABX, and ultimately decannulated and s/p 60XLTCP trach and PEG on 3/7. Patient catie transferred to RCU on 3/11 and course complicated by recurrent high grade fevers.     NEUROLOGY  # Hx of Pseudotumor Cerebri   - s/p LP in 2024 with high opening pressure  - s/p MRI 2024 with possible pituitary mass but unclear because of pressure effect from pseudotumor cerebri causing partially empty sella.  - Prolactin elevated   - ACTH low but recieved steroids during admission   - FT4 low normal and TSH normal, but given FT4 low normal TSH should be higher   - Discuss endocrine consult for inhouse work up vs outpatient.     # Sedation   - Weaned off sedation in ICU   - Nimbex turned off 2/27   - Prop turned off 3/9   - Precedex turned off and catapres started 3/11, however persistent fever spikes noted with concern for precedex withdrawal?   - Continue on catapres 0.3 TID (3/16 - )   - Continue on seroquel 12.5 QHS     # BL LE neuropathy   - CIPN concern   - Continue on neurontin 100 BID and 200 QHS  - Monitor pain    CARDIOVASCULAR  # HTN   - HTN noted in ICU requiring cardene and esmolol GTTs   - Lisinopril dc'ed to increase catapres   - Continue on norvasc 10 and catapres 0.2 TID   - Monitor BP     # AFIB   - Hx of pAFIB   - No RVR episodes or pAFIB episodes in ICU   - No rate control medications needed  - Monitor on telemetry     # RV dilation second to PHTN   - POCUS on arrival to [facility] with RVE concern   - TTE 10/2024 with EF 55-60 with normal LVSF, moderate LVH and normal RVSF and size with no concern for pHTN   - TTE on 2/25 at  with EF 61 and normal LVSF, but enlarged RV size with reduced RVSF, mild TR, mild PHTN with PASP 49, and dilated IVC to 3.4cn, but normal TAPSE 2.1.  - Continue on aggressive diuresis in ICU    - TTE 3/11 with RVSF reduced with TAPSE 1.6. IVC improved to 2.12cm.   - Continue on lasix 40 PO BID   - Monitor IO    RESPIRATORY  # ARDS second to MFPNA   - Hx of BEA on CPAP presented to  post URI with SOB and found with AHRF with progression to ARDS second to post viral MFPNA   - Intubated 2/25   - Cannulated for vvECMO 2/26   - s/p 60XLTCP tracheostomy 3/7   - Decannulated 3/7   - Continue on TC QD with PMV as able with strong phonation  - Continue on PS 12/5 QHS   - Continue on nebs and HTS   - CTSx to remove tracheostomy and ECMO sutures on 3/17     GI  # Dysphagia   - s/p PEG 3/7   - Attempted green dye on 3/13 and failed  - Continue on TF  - Continue on miralax and senna  - Reattempt green dye next week     # Mild transaminitis   - LFTs elevated in ICU with TBILI elevated likely from ECMO hemolysis   - US ABD with hepatic steatosis   - Trend LFTs      RENAL  # ELLA   - ELLA likely from cardiorenal with RVE   - Diuresed in ICU and improved   - Monitor renal function and UOP     # Hypernatremia   - Continue on  Q6H (decreased 3/16)    INFECTIOUS DISEASE  # Recurrent fevers   - Recurrent fevers post ECMO decannulation thought initially to be cytokine surge   - BCx, SCx, UCx, RVP and MRSA RPT on 3/12 negative   - Despite negative cx, WBC rising, and zosyn resumed (3/12 - ) with improved WBC , however recurrent fevers noted.   - Check RPT PCT, BCx, SCx, UA, RVP and CXR .      # MFPNA and abdominal pannus cellulitis   - Presented to [facility] post URI with SOB and found with AHRF with progression to ARDS second to post viral MFPNA   - RVP, legionella, strept, BAL, BCx, UCx, HIV, PCP, and adenovirus PCR negative   - Initially started on multiple agents in ICU and ultimatelty completed zosyn (2/26-3/9) ZAMX (2/25-2/26) and vanco (2/26-3/1)     # Penile discharge   - GC/ chlamydia negative   - HIV negative   - Monitor for now    # PPX   - MRSA PCR positive and completed bactroban (2/26-3/3)     HEME   # Anemia and thrombocytopenia   - Likely second to shearing with ECMO circuit   - Monitor HH and PLTs post decannulation and on agratroban     VASCULAR   # R IJ and BL LE DVTs   - Post ECMO dopplers on 3/9 negative for BL UE/ LE DVTs.   - Subsequent post ECMO dopplers performed on 3/14 and noted with acute, non-occlusive deep vein thrombosis noted within the right internal jugular vein. acute, occlusive deep vein thromboses noted within the right and left peroneal veins at both mid calves, and age-indeterminate, occlusive deep vein thrombosis visualized within the left gastrocnemius vein at the proximal calf.     - Continue on argatroban GTT and change to eliquis today     ENDOCRINE  # Pre-DM2   - A1C 6.7  - Continue on ISS   - Monitor FS   - IF no demand then stop ISS     LINES/ TUBES  - R IJ and R FEM ECMO (2/26-3/7)     SKIN  # Pannus canndial intertrigo   - Continue on clotrimazole cream   - WOC appreciated     ETHICS/ GOC    - FULL CODE     DISPO - PT and PMR pending   38 YO M with PMHx of morbid obesity, BEA on CPAP, pAFIB (not on AC), HTN, and BL papilledema attributed to pseudotumor cerebri who presented initially to [facility] on 2/24 with SOB and BL LE edema. Found with URI outpatient with progressive SOB, and concern for noted for MFPNA on imaging. Course progressed with AHRF with worsening O2 demand, respiratory distress, secretions, and somnolence requiring intubation (difficult with success after 6 attempts) in ED and admission to [facility] MICU. While in MICU, patient noted with increasing O2 demand with no improvement despite optimal vent management. Concern noted for progressive ARDS, unable to prone given morbid obesity, and ultimately cannulated for vvECMO on 2/25 and transferred to [facility] for further management on 2/26. While at [facility], tx with diuresis and ABX, and ultimately decannulated and s/p 60XLTCP trach and PEG on 3/7. Patient catie transferred to RCU on 3/11 and course complicated by recurrent high grade fevers.     NEUROLOGY  # Hx of Pseudotumor Cerebri   - s/p LP in 2024 with high opening pressure  - s/p MRI 2024 with possible pituitary mass but unclear because of pressure effect from pseudotumor cerebri causing partially empty sella.  - Prolactin elevated   - ACTH low but recieved steroids during admission   - FT4 low normal and TSH normal, but given FT4 low normal TSH should be higher   - Discuss endocrine consult for inhouse work up vs outpatient.     # Sedation   - Weaned off sedation in ICU   - Nimbex turned off 2/27   - Prop turned off 3/9   - Precedex turned off and catapres started 3/11, however persistent fever spikes noted with concern for precedex withdrawal?   - Continue on catapres 0.3 TID (3/16 - )   - Continue on seroquel 12.5 QHS     # BL LE neuropathy   - CIPN concern   - Continue on neurontin 100 BID and 200 QHS  - Monitor pain    CARDIOVASCULAR  # HTN   - HTN noted in ICU requiring cardene and esmolol GTTs   - Lisinopril dc'ed to increase catapres   - Continue on norvasc 10 and catapres 0.2 TID   - Monitor BP     # AFIB   - Hx of pAFIB   - No RVR episodes or pAFIB episodes in ICU   - No rate control medications needed  - Monitor on telemetry     # RV dilation second to PHTN   - POCUS on arrival to [facility] with RVE concern   - TTE 10/2024 with EF 55-60 with normal LVSF, moderate LVH and normal RVSF and size with no concern for pHTN   - TTE on 2/25 at  with EF 61 and normal LVSF, but enlarged RV size with reduced RVSF, mild TR, mild PHTN with PASP 49, and dilated IVC to 3.4cn, but normal TAPSE 2.1.  - Continue on aggressive diuresis in ICU    - TTE 3/11 with RVSF reduced with TAPSE 1.6. IVC improved to 2.12cm.   - Continue on lasix 40 PO BID   - Monitor IO    RESPIRATORY  # ARDS second to MFPNA   - Hx of BEA on CPAP presented to  post URI with SOB and found with AHRF with progression to ARDS second to post viral MFPNA   - Intubated 2/25   - Cannulated for vvECMO 2/26   - s/p [id number] tracheostomy 3/7   - Decannulated 3/7   - Continue on TC QD with PMV as able with strong phonation  - Continue on PS 12/5 QHS   - Continue on nebs and HTS   - CTSx to remove tracheostomy and ECMO sutures on 3/17     GI  # Dysphagia   - s/p PEG 3/7   - Attempted green dye on 3/13 and failed  - Continue on TF  - Continue on miralax and senna  - Reattempt green dye next week     # Mild transaminitis   - LFTs elevated in ICU with TBILI elevated likely from ECMO hemolysis   - US ABD with hepatic steatosis   - Trend LFTs      RENAL  # ELLA   - ELLA likely from cardiorenal with RVE   - Diuresed in ICU and improved   - Monitor renal function and UOP     # Hypernatremia   - Continue on  Q6H (decreased 3/16)    INFECTIOUS DISEASE  # Recurrent fevers with infxn vs precedex withdrawal   - Recurrent fevers post ECMO decannulation thought initially to be cytokine surge   - BCx, SCx, UCx, RVP and MRSA RPT on 3/12 negative   - Despite negative cx, WBC rising, and zosyn resumed (3/12 - ) with improved WBC , however recurrent fevers noted.   - Increasing catapres as able.  - Check RPT PCT, BCx, SCx, UA, RVP and CXR .      # MFPNA and abdominal pannus cellulitis   - Presented to HH post URI with SOB and found with AHRF with progression to ARDS second to post viral MFPNA   - RVP, legionella, strept, BAL, BCx, UCx, HIV, PCP, and adenovirus PCR negative   - Initially started on multiple agents in ICU and ultimatelty completed zosyn (2/26-3/9) ZAMX (2/25-2/26) and vanco (2/26-3/1)     # Penile discharge   - GC/ chlamydia negative   - HIV negative   - Monitor for now    # PPX   - MRSA PCR positive and completed bactroban (2/26-3/3)     HEME   # Anemia and thrombocytopenia   - Likely second to shearing with ECMO circuit   - Monitor HH and PLTs post decannulation and on agratroban     VASCULAR   # R IJ and BL LE DVTs   - Post ECMO dopplers on 3/9 negative for BL UE/ LE DVTs.   - Subsequent post ECMO dopplers performed on 3/14 and noted with acute, non-occlusive deep vein thrombosis noted within the right internal jugular vein. acute, occlusive deep vein thromboses noted within the right and left peroneal veins at both mid calves, and age-indeterminate, occlusive deep vein thrombosis visualized within the left gastrocnemius vein at the proximal calf.     - Continue on argatroban GTT and change to eliquis today     ENDOCRINE  # Pre-DM2   - A1C 6.7  - Continue on ISS   - Monitor FS   - IF no demand then stop ISS     LINES/ TUBES  - R IJ and R FEM ECMO (2/26-3/7)     SKIN  # Pannus canndial intertrigo   - Continue on clotrimazole cream   - WOC appreciated     ETHICS/ GOC    - FULL CODE     DISPO - PT and PMR pending

## 2025-03-17 ENCOUNTER — RESULT REVIEW (OUTPATIENT)
Age: 40
End: 2025-03-17

## 2025-03-17 LAB
-  BLOOD PCR PANEL: SIGNIFICANT CHANGE UP
ANION GAP SERPL CALC-SCNC: 11 MMOL/L — SIGNIFICANT CHANGE UP (ref 7–14)
ANISOCYTOSIS BLD QL: SLIGHT — SIGNIFICANT CHANGE UP
APPEARANCE UR: ABNORMAL
APTT BLD: 30.7 SEC — SIGNIFICANT CHANGE UP (ref 24.5–35.6)
BACTERIA # UR AUTO: NEGATIVE /HPF — SIGNIFICANT CHANGE UP
BASE EXCESS BLDV CALC-SCNC: 7.8 MMOL/L — HIGH (ref -2–3)
BASOPHILS # BLD AUTO: 0 K/UL — SIGNIFICANT CHANGE UP (ref 0–0.2)
BASOPHILS NFR BLD AUTO: 0 % — SIGNIFICANT CHANGE UP (ref 0–2)
BILIRUB UR-MCNC: ABNORMAL
BUN SERPL-MCNC: 12 MG/DL — SIGNIFICANT CHANGE UP (ref 7–23)
CALCIUM SERPL-MCNC: 8.3 MG/DL — LOW (ref 8.4–10.5)
CAST: 2 /LPF — SIGNIFICANT CHANGE UP (ref 0–4)
CHLORIDE SERPL-SCNC: 99 MMOL/L — SIGNIFICANT CHANGE UP (ref 98–107)
CO2 BLDV-SCNC: 39 MMOL/L — HIGH (ref 22–26)
CO2 SERPL-SCNC: 29 MMOL/L — SIGNIFICANT CHANGE UP (ref 22–31)
COLOR SPEC: SIGNIFICANT CHANGE UP
CREAT SERPL-MCNC: 0.72 MG/DL — SIGNIFICANT CHANGE UP (ref 0.5–1.3)
CULTURE RESULTS: ABNORMAL
DIFF PNL FLD: NEGATIVE — SIGNIFICANT CHANGE UP
EGFR: 121 ML/MIN/1.73M2 — SIGNIFICANT CHANGE UP
EGFR: 121 ML/MIN/1.73M2 — SIGNIFICANT CHANGE UP
EOSINOPHIL # BLD AUTO: 0.22 K/UL — SIGNIFICANT CHANGE UP (ref 0–0.5)
EOSINOPHIL NFR BLD AUTO: 1.8 % — SIGNIFICANT CHANGE UP (ref 0–6)
GAS PNL BLDV: SIGNIFICANT CHANGE UP
GIANT PLATELETS BLD QL SMEAR: PRESENT — SIGNIFICANT CHANGE UP
GLUCOSE BLDC GLUCOMTR-MCNC: 106 MG/DL — HIGH (ref 70–99)
GLUCOSE BLDC GLUCOMTR-MCNC: 121 MG/DL — HIGH (ref 70–99)
GLUCOSE BLDC GLUCOMTR-MCNC: 123 MG/DL — HIGH (ref 70–99)
GLUCOSE BLDC GLUCOMTR-MCNC: 138 MG/DL — HIGH (ref 70–99)
GLUCOSE SERPL-MCNC: 132 MG/DL — HIGH (ref 70–99)
GLUCOSE UR QL: NEGATIVE MG/DL — SIGNIFICANT CHANGE UP
GRAM STN FLD: ABNORMAL
GRAM STN FLD: ABNORMAL
HCO3 BLDV-SCNC: 37 MMOL/L — HIGH (ref 22–29)
HCT VFR BLD CALC: 28.6 % — LOW (ref 39–50)
HGB BLD-MCNC: 8.1 G/DL — LOW (ref 13–17)
HYPOCHROMIA BLD QL: SIGNIFICANT CHANGE UP
IANC: 10.07 K/UL — HIGH (ref 1.8–7.4)
KETONES UR-MCNC: NEGATIVE MG/DL — SIGNIFICANT CHANGE UP
LEUKOCYTE ESTERASE UR-ACNC: ABNORMAL
LYMPHOCYTES # BLD AUTO: 0.43 K/UL — LOW (ref 1–3.3)
LYMPHOCYTES # BLD AUTO: 3.5 % — LOW (ref 13–44)
MACROCYTES BLD QL: SLIGHT — SIGNIFICANT CHANGE UP
MAGNESIUM SERPL-MCNC: 1.9 MG/DL — SIGNIFICANT CHANGE UP (ref 1.6–2.6)
MCHC RBC-ENTMCNC: 24.2 PG — LOW (ref 27–34)
MCHC RBC-ENTMCNC: 28.3 G/DL — LOW (ref 32–36)
MCV RBC AUTO: 85.4 FL — SIGNIFICANT CHANGE UP (ref 80–100)
METHOD TYPE: SIGNIFICANT CHANGE UP
MONOCYTES # BLD AUTO: 0.32 K/UL — SIGNIFICANT CHANGE UP (ref 0–0.9)
MONOCYTES NFR BLD AUTO: 2.6 % — SIGNIFICANT CHANGE UP (ref 2–14)
MYELOCYTES NFR BLD: 0.9 % — HIGH (ref 0–0)
NEUTROPHILS # BLD AUTO: 10.84 K/UL — HIGH (ref 1.8–7.4)
NEUTROPHILS NFR BLD AUTO: 86 % — HIGH (ref 43–77)
NEUTS BAND # BLD: 2.6 % — SIGNIFICANT CHANGE UP (ref 0–6)
NEUTS BAND NFR BLD: 2.6 % — SIGNIFICANT CHANGE UP (ref 0–6)
NITRITE UR-MCNC: NEGATIVE — SIGNIFICANT CHANGE UP
PCO2 BLDV: 71 MMHG — CRITICAL HIGH (ref 42–55)
PH BLDV: 7.32 — SIGNIFICANT CHANGE UP (ref 7.32–7.43)
PH UR: 5.5 — SIGNIFICANT CHANGE UP (ref 5–8)
PHOSPHATE SERPL-MCNC: 3.6 MG/DL — SIGNIFICANT CHANGE UP (ref 2.5–4.5)
PLAT MORPH BLD: ABNORMAL
PLATELET # BLD AUTO: 195 K/UL — SIGNIFICANT CHANGE UP (ref 150–400)
PLATELET COUNT - ESTIMATE: NORMAL — SIGNIFICANT CHANGE UP
PO2 BLDV: 53 MMHG — HIGH (ref 25–45)
POLYCHROMASIA BLD QL SMEAR: SLIGHT — SIGNIFICANT CHANGE UP
POTASSIUM SERPL-MCNC: 3.8 MMOL/L — SIGNIFICANT CHANGE UP (ref 3.5–5.3)
POTASSIUM SERPL-SCNC: 3.8 MMOL/L — SIGNIFICANT CHANGE UP (ref 3.5–5.3)
PROT UR-MCNC: 100 MG/DL
RBC # BLD: 3.35 M/UL — LOW (ref 4.2–5.8)
RBC # FLD: 23.9 % — HIGH (ref 10.3–14.5)
RBC BLD AUTO: ABNORMAL
RBC CASTS # UR COMP ASSIST: 2 /HPF — SIGNIFICANT CHANGE UP (ref 0–4)
SAO2 % BLDV: 84.1 % — SIGNIFICANT CHANGE UP (ref 67–88)
SODIUM SERPL-SCNC: 139 MMOL/L — SIGNIFICANT CHANGE UP (ref 135–145)
SP GR SPEC: 1.03 — SIGNIFICANT CHANGE UP (ref 1–1.03)
SPECIMEN SOURCE: SIGNIFICANT CHANGE UP
SQUAMOUS # UR AUTO: 2 /HPF — SIGNIFICANT CHANGE UP (ref 0–5)
UROBILINOGEN FLD QL: 1 MG/DL — SIGNIFICANT CHANGE UP (ref 0.2–1)
VARIANT LYMPHS # BLD: 2.6 % — SIGNIFICANT CHANGE UP (ref 0–6)
VARIANT LYMPHS NFR BLD MANUAL: 2.6 % — SIGNIFICANT CHANGE UP (ref 0–6)
WBC # BLD: 12.23 K/UL — HIGH (ref 3.8–10.5)
WBC # FLD AUTO: 12.23 K/UL — HIGH (ref 3.8–10.5)
WBC UR QL: 2 /HPF — SIGNIFICANT CHANGE UP (ref 0–5)

## 2025-03-17 PROCEDURE — G0545: CPT

## 2025-03-17 PROCEDURE — 74177 CT ABD & PELVIS W/CONTRAST: CPT | Mod: 26

## 2025-03-17 PROCEDURE — 99223 1ST HOSP IP/OBS HIGH 75: CPT | Mod: GC

## 2025-03-17 PROCEDURE — 71260 CT THORAX DX C+: CPT | Mod: 26

## 2025-03-17 PROCEDURE — 99222 1ST HOSP IP/OBS MODERATE 55: CPT

## 2025-03-17 PROCEDURE — 94003 VENT MGMT INPAT SUBQ DAY: CPT

## 2025-03-17 PROCEDURE — 93308 TTE F-UP OR LMTD: CPT | Mod: 26,GC

## 2025-03-17 PROCEDURE — 99233 SBSQ HOSP IP/OBS HIGH 50: CPT

## 2025-03-17 RX ORDER — ACETAMINOPHEN 500 MG/5ML
1000 LIQUID (ML) ORAL ONCE
Refills: 0 | Status: COMPLETED | OUTPATIENT
Start: 2025-03-17 | End: 2025-03-17

## 2025-03-17 RX ORDER — PIPERACILLIN-TAZO-DEXTROSE,ISO 2.25G/50ML
3.38 IV SOLUTION, PIGGYBACK PREMIX FROZEN(ML) INTRAVENOUS
Refills: 0 | Status: COMPLETED | OUTPATIENT
Start: 2025-03-17 | End: 2025-03-18

## 2025-03-17 RX ORDER — MAGNESIUM SULFATE 500 MG/ML
1 SYRINGE (ML) INJECTION ONCE
Refills: 0 | Status: COMPLETED | OUTPATIENT
Start: 2025-03-17 | End: 2025-03-17

## 2025-03-17 RX ORDER — CASPOFUNGIN ACETATE 5 MG/ML
50 INJECTION, POWDER, LYOPHILIZED, FOR SOLUTION INTRAVENOUS EVERY 24 HOURS
Refills: 0 | Status: DISCONTINUED | OUTPATIENT
Start: 2025-03-18 | End: 2025-03-17

## 2025-03-17 RX ORDER — CASPOFUNGIN ACETATE 5 MG/ML
70 INJECTION, POWDER, LYOPHILIZED, FOR SOLUTION INTRAVENOUS EVERY 24 HOURS
Refills: 0 | Status: COMPLETED | OUTPATIENT
Start: 2025-03-18 | End: 2025-04-15

## 2025-03-17 RX ORDER — CASPOFUNGIN ACETATE 5 MG/ML
70 INJECTION, POWDER, LYOPHILIZED, FOR SOLUTION INTRAVENOUS ONCE
Refills: 0 | Status: COMPLETED | OUTPATIENT
Start: 2025-03-17 | End: 2025-03-17

## 2025-03-17 RX ADMIN — IPRATROPIUM BROMIDE AND ALBUTEROL SULFATE 3 MILLILITER(S): .5; 2.5 SOLUTION RESPIRATORY (INHALATION) at 21:41

## 2025-03-17 RX ADMIN — Medication 25 GRAM(S): at 21:19

## 2025-03-17 RX ADMIN — APIXABAN 5 MILLIGRAM(S): 2.5 TABLET, FILM COATED ORAL at 17:59

## 2025-03-17 RX ADMIN — Medication 400 MILLIGRAM(S): at 21:15

## 2025-03-17 RX ADMIN — Medication 4 MILLILITER(S): at 07:15

## 2025-03-17 RX ADMIN — CLOTRIMAZOLE 1 APPLICATION(S): 1 CREAM TOPICAL at 18:00

## 2025-03-17 RX ADMIN — QUETIAPINE FUMARATE 12.5 MILLIGRAM(S): 25 TABLET ORAL at 21:22

## 2025-03-17 RX ADMIN — Medication 1 APPLICATION(S): at 05:37

## 2025-03-17 RX ADMIN — Medication 40 MILLIGRAM(S): at 13:03

## 2025-03-17 RX ADMIN — Medication 25 GRAM(S): at 13:03

## 2025-03-17 RX ADMIN — Medication 0.3 MILLIGRAM(S): at 21:24

## 2025-03-17 RX ADMIN — Medication 15 MILLILITER(S): at 18:00

## 2025-03-17 RX ADMIN — IPRATROPIUM BROMIDE AND ALBUTEROL SULFATE 3 MILLILITER(S): .5; 2.5 SOLUTION RESPIRATORY (INHALATION) at 07:15

## 2025-03-17 RX ADMIN — IPRATROPIUM BROMIDE AND ALBUTEROL SULFATE 3 MILLILITER(S): .5; 2.5 SOLUTION RESPIRATORY (INHALATION) at 03:25

## 2025-03-17 RX ADMIN — Medication 1000 MILLIGRAM(S): at 22:00

## 2025-03-17 RX ADMIN — Medication 0.3 MILLIGRAM(S): at 05:37

## 2025-03-17 RX ADMIN — Medication 25 GRAM(S): at 05:31

## 2025-03-17 RX ADMIN — Medication 500000 UNIT(S): at 05:33

## 2025-03-17 RX ADMIN — Medication 100 GRAM(S): at 08:32

## 2025-03-17 RX ADMIN — GABAPENTIN 100 MILLIGRAM(S): 400 CAPSULE ORAL at 17:59

## 2025-03-17 RX ADMIN — AMLODIPINE BESYLATE 10 MILLIGRAM(S): 10 TABLET ORAL at 05:32

## 2025-03-17 RX ADMIN — FUROSEMIDE 40 MILLIGRAM(S): 10 INJECTION INTRAMUSCULAR; INTRAVENOUS at 05:32

## 2025-03-17 RX ADMIN — FUROSEMIDE 40 MILLIGRAM(S): 10 INJECTION INTRAMUSCULAR; INTRAVENOUS at 17:59

## 2025-03-17 RX ADMIN — APIXABAN 5 MILLIGRAM(S): 2.5 TABLET, FILM COATED ORAL at 05:37

## 2025-03-17 RX ADMIN — CASPOFUNGIN ACETATE 260 MILLIGRAM(S): 5 INJECTION, POWDER, LYOPHILIZED, FOR SOLUTION INTRAVENOUS at 04:23

## 2025-03-17 RX ADMIN — Medication 650 MILLIGRAM(S): at 16:00

## 2025-03-17 RX ADMIN — Medication 1000 MILLIGRAM(S): at 00:00

## 2025-03-17 RX ADMIN — Medication 15 MILLILITER(S): at 05:34

## 2025-03-17 RX ADMIN — GABAPENTIN 100 MILLIGRAM(S): 400 CAPSULE ORAL at 05:31

## 2025-03-17 RX ADMIN — Medication 15 MILLILITER(S): at 13:03

## 2025-03-17 RX ADMIN — CLOTRIMAZOLE 1 APPLICATION(S): 1 CREAM TOPICAL at 05:31

## 2025-03-17 RX ADMIN — Medication 20 MILLIEQUIVALENT(S): at 08:32

## 2025-03-17 RX ADMIN — GABAPENTIN 200 MILLIGRAM(S): 400 CAPSULE ORAL at 21:19

## 2025-03-17 RX ADMIN — Medication 0.3 MILLIGRAM(S): at 13:03

## 2025-03-17 NOTE — CONSULT NOTE ADULT - ASSESSMENT
40 YO M with PMHx of morbid obesity, BEA on CPAP, pAFIB (not on AC), HTN, and BL papilledema attributed to pseudotumor cerebri who presented initially to [facility] on 2/24 with SOB and BL LE edema. Pt with AHRF with worsening O2 demand requiring intubation at [facility]. Pt with increasing O2 demand with no improvement despite optimal vent management with concern noted for progressive ARDS requiring vvECMO on 2/25 and transferred to [facility] for further management on 2/26. While at [facility], tx with diuresis and ABX, and ultimately decannulated and s/p trach and PEG on 3/7. Hospital course complicated by recurrent high grade fevers with BCX + for yeast like cells    Antibiotics:  Zosyn (2/26-3/9) (3/12- )  Caspo (3/17- )    Assessment:  #+BCX  -Pt persistently febrile with elevated WBC  -UA neg, RVP neg  -BCX 3/15 + for yeast like cells (2/4 bottles)  -ECMO catheter decannulated 3/7 40 YO M with PMHx of morbid obesity, BEA on CPAP, pAFIB (not on AC), HTN, and BL papilledema attributed to pseudotumor cerebri who presented initially to [facility] on 2/24 with SOB and BL LE edema. Pt with AHRF with worsening O2 demand requiring intubation at [facility]. Pt with increasing O2 demand with no improvement despite optimal vent management with concern noted for progressive ARDS requiring vvECMO on 2/25 and transferred to [facility] for further management on 2/26. While at [facility], tx with diuresis and ABX, and ultimately decannulated and s/p trach and PEG on 3/7. Hospital course complicated by recurrent high grade fevers with BCX + for yeast like cells    Antibiotics:  Zosyn (2/26-3/9) (3/12- )  Caspo (3/17- )    Assessment:  #+BCX  -Pt persistently febrile with elevated WBC  -UA neg, RVP neg  -BCX 3/15 + for yeast like cells (2/4 bottles)  -ECMO catheter decannulated 3/7    Recommendation:  -Recommend Caspo 70mg daily. F/U ID of yeast   -Recommend CT A/P (with contrast if possible) and TTE to evaluate for source of yeast in BCX  -Repeat BCX x2  -Can cont Zosyn till 3/18 (for 7 day course)  -Cont to monitor fever and WBC curve    Ariana Marie  ID Fellow

## 2025-03-17 NOTE — CONSULT NOTE ADULT - SUBJECTIVE AND OBJECTIVE BOX
Patient is a 39y old  Male who presents with a chief complaint of sob (02 Mar 2025 06:19)    HPI:  40 yo M w/ class III obesity, pAfib (no AC), HTN, BEA (on CPAP), history of b/l papilledema attributed to pseudotumor cerebri s/p LP in 2024 with very high opening pressure, who is transferred for VV ECMO for ARDS and severe hypoxia. Patient initially presented to [place] on 2/24 for SOB and b/l LE edema. 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 He is a current every day smoker of Newports and marijuana, but does not drink or do other drugs. Currently lives with his mother. There is a long term partner in his life, but they are not , and they have an on/off relationship currently not very involved with each other as per the patient's mother and aunt.  At [facility] where he was getting an eval last year for shortness of breath, he had a sleep study and was diagnosed with sleep apnea, prescribed a PAP machine, which he has in his room but the mother is not sure how many nights per week he uses it. Recently, the last day or two, his mother and brother have been under the weather with URIs and the patient 2 days ago started to develop a ruynny nose, ough, some wheezing of the chest, as well as worsening shortness of breath and fevers at home. He called his aunt who told him to go get checked out and then he landed at the [place] ED. Patient found to be reportedly hypoxemic to 70% on RA with worsening respiratory status/secretions and somnolence in the ED. Patient was intubated with difficulty (7 attempts) due to very large tongue secretions. Despite optimal vent management, patient remained hypoxemic. Unable to prone due to obesity. Patient cannulated for VV ECMO on 2/25 and transferred to [facility] for further management.       Pt s/p treatment of pneumonia. Now s/p trach and peg. ECMO catheter decannulated 3/7. Pt persistently febrile with BCX now + for yeast like cells, ID consulted for further recommendation      PAST MEDICAL & SURGICAL HISTORY:  HTN (hypertension)      Atrial fibrillation  paroxysmal      Papilledema of both eyes      Chronic sinusitis      Morbid obesity      No significant past surgical history          Allergies  No Known Allergies    ANTIMICROBIALS (past 90 days)  MEDICATIONS  (STANDING):  caspofungin IVPB   260 mL/Hr IV Intermittent (03-17-25 @ 04:23)    linezolid  IVPB   300 mL/Hr IV Intermittent (02-26-25 @ 05:35)    nystatin    Suspension   [id number] Unit(s) Oral (03-17-25 @ 05:33)   [id number] Unit(s) Oral (03-16-25 @ 23:41)   [id number] Unit(s) Oral (03-16-25 @ 17:36)   [id number] Unit(s) Oral (03-16-25 @ 11:02)   [id number] Unit(s) Oral (03-16-25 @ 06:17)   [id number] Unit(s) Oral (03-15-25 @ 21:12)   [id number] Unit(s) Oral (03-15-25 @ 17:37)   [id number] Unit(s) Oral (03-15-25 @ 12:52)   [id number] Unit(s) Oral (03-15-25 @ 05:08)   [id number] Unit(s) Oral (03-14-25 @ 22:21)   [id number] Unit(s) Oral (03-14-25 @ 17:55)   [id number] Unit(s) Oral (03-14-25 @ 11:24)   [id number] Unit(s) Oral (03-14-25 @ 06:24)   [id number] Unit(s) Oral (03-14-25 @ 00:05)   [id number] Unit(s) Oral (03-13-25 @ 17:41)   [id number] Unit(s) Oral (03-13-25 @ 11:22)   [id number] Unit(s) Oral (03-13-25 @ 06:20)   [id number] Unit(s) Oral (03-13-25 @ 02:47)   [id number] Unit(s) Oral (03-12-25 @ 19:36)   [id number] Unit(s) Oral (03-12-25 @ 11:25)   [id number] Unit(s) Oral (03-12-25 @ 06:42)   [id number] Unit(s) Oral (03-12-25 @ 00:50)   [id number] Unit(s) Oral (03-11-25 @ 18:26)   [id number] Unit(s) Oral (03-11-25 @ 13:18)   [id number] Unit(s) Oral (03-11-25 @ 06:08)   [id number] Unit(s) Oral (03-11-25 @ 01:57)   [id number] Unit(s) Oral (03-10-25 @ 17:55)   [id number] Unit(s) Oral (03-10-25 @ 11:07)   [id number] Unit(s) Oral (03-10-25 @ 05:31)   [id number] Unit(s) Oral (03-10-25 @ 01:00)   [id number] Unit(s) Oral (03-09-25 @ 18:28)   [id number] Unit(s) Oral (03-09-25 @ 11:05)   [id number] Unit(s) Oral (03-09-25 @ 05:09)   [id number] Unit(s) Oral (03-09-25 @ 00:23)   [id number] Unit(s) Oral (03-08-25 @ 18:07)   [id number] Unit(s) Oral (03-08-25 @ 11:42)   [id number] Unit(s) Oral (03-08-25 @ 05:33)   [id number] Unit(s) Oral (03-07-25 @ 23:40)   [id number] Unit(s) Oral (03-07-25 @ 17:19)    piperacillin/tazobactam IVPB.   200 mL/Hr IV Intermittent (03-11-25 @ 14:40)    piperacillin/tazobactam IVPB.-   25 mL/Hr IV Intermittent (03-11-25 @ 18:25)    piperacillin/tazobactam IVPB.-   25 mL/Hr IV Intermittent (03-12-25 @ 00:50)    piperacillin/tazobactam IVPB..   25 mL/Hr IV Intermittent (03-12-25 @ 06:41)    piperacillin/tazobactam IVPB..   25 mL/Hr IV Intermittent (03-17-25 @ 05:31)   25 mL/Hr IV Intermittent (03-16-25 @ 21:00)   25 mL/Hr IV Intermittent (03-16-25 @ 16:01)   25 mL/Hr IV Intermittent (03-16-25 @ 06:18)   25 mL/Hr IV Intermittent (03-15-25 @ 21:14)   25 mL/Hr IV Intermittent (03-15-25 @ 13:06)   25 mL/Hr IV Intermittent (03-15-25 @ 05:02)   25 mL/Hr IV Intermittent (03-14-25 @ 22:53)   25 mL/Hr IV Intermittent (03-14-25 @ 13:56)   25 mL/Hr IV Intermittent (03-14-25 @ 06:25)   25 mL/Hr IV Intermittent (03-13-25 @ 23:33)   25 mL/Hr IV Intermittent (03-13-25 @ 16:39)   25 mL/Hr IV Intermittent (03-13-25 @ 06:25)   25 mL/Hr IV Intermittent (03-12-25 @ 22:10)   25 mL/Hr IV Intermittent (03-12-25 @ 13:44)    piperacillin/tazobactam IVPB..   25 mL/Hr IV Intermittent (03-09-25 @ 05:11)   25 mL/Hr IV Intermittent (03-08-25 @ 22:07)   25 mL/Hr IV Intermittent (03-08-25 @ 13:09)   25 mL/Hr IV Intermittent (03-08-25 @ 05:33)   25 mL/Hr IV Intermittent (03-07-25 @ 21:31)   25 mL/Hr IV Intermittent (03-07-25 @ 14:20)   25 mL/Hr IV Intermittent (03-07-25 @ 06:16)   25 mL/Hr IV Intermittent (03-06-25 @ 21:09)   25 mL/Hr IV Intermittent (03-06-25 @ 14:50)    piperacillin/tazobactam IVPB..   25 mL/Hr IV Intermittent (02-27-25 @ 06:02)   25 mL/Hr IV Intermittent (02-26-25 @ 21:27)    piperacillin/tazobactam IVPB..   25 mL/Hr IV Intermittent (03-03-25 @ 05:09)   25 mL/Hr IV Intermittent (03-02-25 @ 21:06)   25 mL/Hr IV Intermittent (03-02-25 @ 13:25)   25 mL/Hr IV Intermittent (03-02-25 @ 04:54)   25 mL/Hr IV Intermittent (03-01-25 @ 21:37)   25 mL/Hr IV Intermittent (03-01-25 @ 14:15)   25 mL/Hr IV Intermittent (03-01-25 @ 05:06)   25 mL/Hr IV Intermittent (02-28-25 @ 22:56)   25 mL/Hr IV Intermittent (02-28-25 @ 14:30)   25 mL/Hr IV Intermittent (02-28-25 @ 05:18)   25 mL/Hr IV Intermittent (02-27-25 @ 22:29)   25 mL/Hr IV Intermittent (02-27-25 @ 13:07)    piperacillin/tazobactam IVPB..   25 mL/Hr IV Intermittent (02-26-25 @ 05:37)    piperacillin/tazobactam IVPB..   25 mL/Hr IV Intermittent (03-06-25 @ 06:21)   25 mL/Hr IV Intermittent (03-05-25 @ 21:34)   25 mL/Hr IV Intermittent (03-05-25 @ 14:29)   25 mL/Hr IV Intermittent (03-05-25 @ 05:13)   25 mL/Hr IV Intermittent (03-04-25 @ 21:26)   25 mL/Hr IV Intermittent (03-04-25 @ 13:17)   25 mL/Hr IV Intermittent (03-04-25 @ 06:12)   25 mL/Hr IV Intermittent (03-03-25 @ 21:01)   25 mL/Hr IV Intermittent (03-03-25 @ 13:18)    vancomycin  IVPB   166.67 mL/Hr IV Intermittent (03-13-25 @ 02:52)   166.67 mL/Hr IV Intermittent (03-12-25 @ 18:35)   166.67 mL/Hr IV Intermittent (03-12-25 @ 11:31)    vancomycin  IVPB   166.67 mL/Hr IV Intermittent (02-26-25 @ 13:27)    vancomycin  IVPB   166.67 mL/Hr IV Intermittent (03-01-25 @ 05:05)   166.67 mL/Hr IV Intermittent (02-28-25 @ 22:56)   166.67 mL/Hr IV Intermittent (02-28-25 @ 14:41)   166.67 mL/Hr IV Intermittent (02-28-25 @ 05:12)   166.67 mL/Hr IV Intermittent (02-27-25 @ 22:04)   166.67 mL/Hr IV Intermittent (02-27-25 @ 14:30)   166.67 mL/Hr IV Intermittent (02-27-25 @ 05:59)   166.67 mL/Hr IV Intermittent (02-26-25 @ 22:56)        caspofungin IVPB 50 every 24 hours  piperacillin/tazobactam IVPB.. 4.5 every 8 hours    MEDICATIONS  (STANDING):  acetaminophen   Oral Liquid .. 650 every 6 hours PRN  albuterol/ipratropium for Nebulization 3 every 6 hours  amLODIPine   Tablet 10 daily  apixaban 5 every 12 hours  cloNIDine 0.3 every 8 hours  dextrose 50% Injectable 25 once  dextrose 50% Injectable 12.5 once  dextrose 50% Injectable 25 once  furosemide Solution 40 every 12 hours  gabapentin 200 at bedtime  gabapentin Solution 100 two times a day  glucagon  Injectable 1 once  insulin lispro (ADMELOG) corrective regimen sliding scale  every 6 hours  pantoprazole   Suspension 40 daily  polyethylene glycol 3350 17 daily  QUEtiapine 12.5 at bedtime  senna Syrup 5 at bedtime    SOCIAL HISTORY:       FAMILY HISTORY:    REVIEW OF SYSTEMS  [  ] ROS unobtainable because:    [  ] All other systems negative except as noted below:	    Constitutional:  [ ] fever [ ] chills  [ ] weight loss  [ ] weakness  Skin:  [ ] rash [ ] phlebitis	  Eyes: [ ] icterus [ ] pain  [ ] discharge	  ENMT: [ ] sore throat  [ ] thrush [ ] ulcers [ ] exudates  Respiratory: [ ] dyspnea [ ] hemoptysis [ ] cough [ ] sputum	  Cardiovascular:  [ ] chest pain [ ] palpitations [ ] edema	  Gastrointestinal:  [ ] nausea [ ] vomiting [ ] diarrhea [ ] constipation [ ] pain	  Genitourinary:  [ ] dysuria [ ] frequency [ ] hematuria [ ] discharge [ ] flank pain  [ ] incontinence  Musculoskeletal:  [ ] myalgias [ ] arthralgias [ ] arthritis  [ ] back pain  Neurological:  [ ] headache [ ] seizures  [ ] confusion/altered mental status  Psychiatric:  [ ] anxiety [ ] depression	  Hematology/Lymphatics:  [ ] lymphadenopathy  Endocrine:  [ ] adrenal [ ] thyroid  Allergic/Immunologic:	 [ ] transplant [ ] seasonal    Vital Signs Last 24 Hrs  T(F): 100.4 (03-17-25 @ 08:00), Max: 103 (03-16-25 @ 15:15)  Vital Signs Last 24 Hrs  HR: 112 (03-17-25 @ 08:00) (79 - 140)  BP: 130/76 (03-17-25 @ 08:00) (123/66 - 137/59)  RR: 26 (03-17-25 @ 08:00)  SpO2: 100% (03-17-25 @ 08:00) (91% - 100%)  Wt(kg): --    PHYSICAL EXAM:  Constitutional: non-toxic, no distress  HEAD/EYES: anicteric, no conjunctival injection  ENT:  supple, no thrush  Cardiovascular:   normal S1, S2, no murmur, no edema  Respiratory:  clear BS bilaterally, no wheezes, no rales  GI:  soft, non-tender, normal bowel sounds  :  no baires, no CVA tenderness  Musculoskeletal:  no synovitis, normal ROM  Neurologic: awake and alert, normal strength, no focal findings  Skin:  no rash, no erythema, no phlebitis  Heme/Onc: no lymphadenopathy   Psychiatric:  awake, alert, appropriate mood                            8.1    12.23 )-----------( 195      ( 17 Mar 2025 06:19 )             28.6   03-17    139  |  99  |  12  ----------------------------<  132[H]  3.8   |  29  |  0.72    Ca    8.3[L]      17 Mar 2025 06:19  Phos  3.6     03-17  Mg     1.90     03-17    Urinalysis (03.17.25 @ 08:32)    Glucose Qualitative, Urine: Negative mg/dL   Blood, Urine: Negative   pH Urine: 5.5   Color: Dark Yellow   Urine Appearance: Cloudy   Bilirubin: Small   Ketone - Urine: Negative mg/dL   Specific Gravity: 1.027   Protein, Urine: 100 mg/dL   Urobilinogen: 1.0 mg/dL   Nitrite: Negative   Leukocyte Esterase Concentration: Trace    Urine Microscopic-Add On (NC) (03.17.25 @ 08:32)    White Blood Cell - Urine: 2 /HPF   Red Blood Cell - Urine: 2 /HPF   Bacteria: Negative /HPF   Cast: 2 /LPF   Epithelial Cells: 2 /HPF    MICROBIOLOGY:Vancomycin Level, Trough: 13.6 (02-28 @ 21:30)  Vancomycin Level, Trough: 15.3 (02-27 @ 13:27)    Culture - Sputum (collected 16 Mar 2025 14:30)  Source: Trach Asp Tracheal Aspirate  Gram Stain (16 Mar 2025 19:41):    Few polymorphonuclear leukocytes per low power field    Rare Squamous epithelial cells per low power field    Rare Gram Positive Rods seen per oil power field    Rare Gram Negative Rods seen per oil power field    Culture - Blood (collected 15 Mar 2025 20:35)  Source: Blood Blood-Venous  Gram Stain (17 Mar 2025 06:44):    Growth in aerobic bottle: Yeast like cells  Preliminary Report (17 Mar 2025 06:44):    Growth in aerobic bottle: Yeast like cells    Culture - Blood (collected 15 Mar 2025 20:20)  Source: Blood Blood-Peripheral  Gram Stain (17 Mar 2025 03:16):    Growth in aerobic bottle: Yeast like cells  Preliminary Report (17 Mar 2025 03:16):    Growth in aerobic bottle: Yeast like cells    Direct identification is available within approximately 3-5    hours either by Blood Panel Multiplexed PCR or Direct    MALDI-TOF. Details: [email or web address]  Organism: Blood Culture PCR (17 Mar 2025 03:26)  Organism: Blood Culture PCR (17 Mar 2025 03:26)      Method Type: PCR      -  Blood PCR Panel: NEG              Rapid RVP Result: NotDetec (03-16 @ 16:35)  Rapid RVP Result: NotDetec (03-12 @ 07:19)      RADIOLOGY:  imaging below personally reviewed and agree with findings     Patient is a 39y old  Male who presents with a chief complaint of sob (02 Mar 2025 06:19)    HPI:  38 yo M w/ class III obesity, pAfib (no AC), HTN, BEA (on CPAP), history of b/l papilledema attributed to pseudotumor cerebri s/p LP in 2024 with very high opening pressure, who is transferred for VV ECMO for ARDS and severe hypoxia. Patient initially presented to [place] on 2/24 for SOB and b/l LE edema. 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 He is a current every day smoker of Newports and marijuana, but does not drink or do other drugs. Currently lives with his mother. There is a long term partner in his life, but they are not , and they have an on/off relationship currently not very involved with each other as per the patient's mother and aunt.  At [facility] where he was getting an eval last year for shortness of breath, he had a sleep study and was diagnosed with sleep apnea, prescribed a PAP machine, which he has in his room but the mother is not sure how many nights per week he uses it. Recently, the last day or two, his mother and brother have been under the weather with URIs and the patient 2 days ago started to develop a ruynny nose, ough, some wheezing of the chest, as well as worsening shortness of breath and fevers at home. He called his aunt who told him to go get checked out and then he landed at the [place] ED. Patient found to be reportedly hypoxemic to 70% on RA with worsening respiratory status/secretions and somnolence in the ED. Patient was intubated with difficulty (7 attempts) due to very large tongue secretions. Despite optimal vent management, patient remained hypoxemic. Unable to prone due to obesity. Patient cannulated for VV ECMO on 2/25 and transferred to [facility] for further management.       Pt s/p treatment of pneumonia. Now s/p trach and peg. ECMO catheter decannulated 3/7. Pt persistently febrile with BCX now + for yeast like cells, ID consulted for further recommendation      PAST MEDICAL & SURGICAL HISTORY:  HTN (hypertension)      Atrial fibrillation  paroxysmal      Papilledema of both eyes      Chronic sinusitis      Morbid obesity      No significant past surgical history          Allergies  No Known Allergies    ANTIMICROBIALS (past 90 days)  MEDICATIONS  (STANDING):  caspofungin IVPB   260 mL/Hr IV Intermittent (03-17-25 @ 04:23)    linezolid  IVPB   300 mL/Hr IV Intermittent (02-26-25 @ 05:35)    nystatin    Suspension   [id number] Unit(s) Oral (03-17-25 @ 05:33)   [id number] Unit(s) Oral (03-16-25 @ 23:41)   [id number] Unit(s) Oral (03-16-25 @ 17:36)   [id number] Unit(s) Oral (03-16-25 @ 11:02)   [id number] Unit(s) Oral (03-16-25 @ 06:17)   [id number] Unit(s) Oral (03-15-25 @ 21:12)   [id number] Unit(s) Oral (03-15-25 @ 17:37)   [id number] Unit(s) Oral (03-15-25 @ 12:52)   [id number] Unit(s) Oral (03-15-25 @ 05:08)   [id number] Unit(s) Oral (03-14-25 @ 22:21)   [id number] Unit(s) Oral (03-14-25 @ 17:55)   [id number] Unit(s) Oral (03-14-25 @ 11:24)   [id number] Unit(s) Oral (03-14-25 @ 06:24)   [id number] Unit(s) Oral (03-14-25 @ 00:05)   [id number] Unit(s) Oral (03-13-25 @ 17:41)   [id number] Unit(s) Oral (03-13-25 @ 11:22)   [id number] Unit(s) Oral (03-13-25 @ 06:20)   [id number] Unit(s) Oral (03-13-25 @ 02:47)   [id number] Unit(s) Oral (03-12-25 @ 19:36)   [id number] Unit(s) Oral (03-12-25 @ 11:25)   [id number] Unit(s) Oral (03-12-25 @ 06:42)   [id number] Unit(s) Oral (03-12-25 @ 00:50)   [id number] Unit(s) Oral (03-11-25 @ 18:26)   [id number] Unit(s) Oral (03-11-25 @ 13:18)   [id number] Unit(s) Oral (03-11-25 @ 06:08)   [id number] Unit(s) Oral (03-11-25 @ 01:57)   [id number] Unit(s) Oral (03-10-25 @ 17:55)   [id number] Unit(s) Oral (03-10-25 @ 11:07)   [id number] Unit(s) Oral (03-10-25 @ 05:31)   [id number] Unit(s) Oral (03-10-25 @ 01:00)   [id number] Unit(s) Oral (03-09-25 @ 18:28)   [id number] Unit(s) Oral (03-09-25 @ 11:05)   [id number] Unit(s) Oral (03-09-25 @ 05:09)   [id number] Unit(s) Oral (03-09-25 @ 00:23)   [id number] Unit(s) Oral (03-08-25 @ 18:07)   [id number] Unit(s) Oral (03-08-25 @ 11:42)   [id number] Unit(s) Oral (03-08-25 @ 05:33)   [id number] Unit(s) Oral (03-07-25 @ 23:40)   [id number] Unit(s) Oral (03-07-25 @ 17:19)    piperacillin/tazobactam IVPB.   200 mL/Hr IV Intermittent (03-11-25 @ 14:40)    piperacillin/tazobactam IVPB.-   25 mL/Hr IV Intermittent (03-11-25 @ 18:25)    piperacillin/tazobactam IVPB.-   25 mL/Hr IV Intermittent (03-12-25 @ 00:50)    piperacillin/tazobactam IVPB..   25 mL/Hr IV Intermittent (03-12-25 @ 06:41)    piperacillin/tazobactam IVPB..   25 mL/Hr IV Intermittent (03-17-25 @ 05:31)   25 mL/Hr IV Intermittent (03-16-25 @ 21:00)   25 mL/Hr IV Intermittent (03-16-25 @ 16:01)   25 mL/Hr IV Intermittent (03-16-25 @ 06:18)   25 mL/Hr IV Intermittent (03-15-25 @ 21:14)   25 mL/Hr IV Intermittent (03-15-25 @ 13:06)   25 mL/Hr IV Intermittent (03-15-25 @ 05:02)   25 mL/Hr IV Intermittent (03-14-25 @ 22:53)   25 mL/Hr IV Intermittent (03-14-25 @ 13:56)   25 mL/Hr IV Intermittent (03-14-25 @ 06:25)   25 mL/Hr IV Intermittent (03-13-25 @ 23:33)   25 mL/Hr IV Intermittent (03-13-25 @ 16:39)   25 mL/Hr IV Intermittent (03-13-25 @ 06:25)   25 mL/Hr IV Intermittent (03-12-25 @ 22:10)   25 mL/Hr IV Intermittent (03-12-25 @ 13:44)    piperacillin/tazobactam IVPB..   25 mL/Hr IV Intermittent (03-09-25 @ 05:11)   25 mL/Hr IV Intermittent (03-08-25 @ 22:07)   25 mL/Hr IV Intermittent (03-08-25 @ 13:09)   25 mL/Hr IV Intermittent (03-08-25 @ 05:33)   25 mL/Hr IV Intermittent (03-07-25 @ 21:31)   25 mL/Hr IV Intermittent (03-07-25 @ 14:20)   25 mL/Hr IV Intermittent (03-07-25 @ 06:16)   25 mL/Hr IV Intermittent (03-06-25 @ 21:09)   25 mL/Hr IV Intermittent (03-06-25 @ 14:50)    piperacillin/tazobactam IVPB..   25 mL/Hr IV Intermittent (02-27-25 @ 06:02)   25 mL/Hr IV Intermittent (02-26-25 @ 21:27)    piperacillin/tazobactam IVPB..   25 mL/Hr IV Intermittent (03-03-25 @ 05:09)   25 mL/Hr IV Intermittent (03-02-25 @ 21:06)   25 mL/Hr IV Intermittent (03-02-25 @ 13:25)   25 mL/Hr IV Intermittent (03-02-25 @ 04:54)   25 mL/Hr IV Intermittent (03-01-25 @ 21:37)   25 mL/Hr IV Intermittent (03-01-25 @ 14:15)   25 mL/Hr IV Intermittent (03-01-25 @ 05:06)   25 mL/Hr IV Intermittent (02-28-25 @ 22:56)   25 mL/Hr IV Intermittent (02-28-25 @ 14:30)   25 mL/Hr IV Intermittent (02-28-25 @ 05:18)   25 mL/Hr IV Intermittent (02-27-25 @ 22:29)   25 mL/Hr IV Intermittent (02-27-25 @ 13:07)    piperacillin/tazobactam IVPB..   25 mL/Hr IV Intermittent (02-26-25 @ 05:37)    piperacillin/tazobactam IVPB..   25 mL/Hr IV Intermittent (03-06-25 @ 06:21)   25 mL/Hr IV Intermittent (03-05-25 @ 21:34)   25 mL/Hr IV Intermittent (03-05-25 @ 14:29)   25 mL/Hr IV Intermittent (03-05-25 @ 05:13)   25 mL/Hr IV Intermittent (03-04-25 @ 21:26)   25 mL/Hr IV Intermittent (03-04-25 @ 13:17)   25 mL/Hr IV Intermittent (03-04-25 @ 06:12)   25 mL/Hr IV Intermittent (03-03-25 @ 21:01)   25 mL/Hr IV Intermittent (03-03-25 @ 13:18)    vancomycin  IVPB   166.67 mL/Hr IV Intermittent (03-13-25 @ 02:52)   166.67 mL/Hr IV Intermittent (03-12-25 @ 18:35)   166.67 mL/Hr IV Intermittent (03-12-25 @ 11:31)    vancomycin  IVPB   166.67 mL/Hr IV Intermittent (02-26-25 @ 13:27)    vancomycin  IVPB   166.67 mL/Hr IV Intermittent (03-01-25 @ 05:05)   166.67 mL/Hr IV Intermittent (02-28-25 @ 22:56)   166.67 mL/Hr IV Intermittent (02-28-25 @ 14:41)   166.67 mL/Hr IV Intermittent (02-28-25 @ 05:12)   166.67 mL/Hr IV Intermittent (02-27-25 @ 22:04)   166.67 mL/Hr IV Intermittent (02-27-25 @ 14:30)   166.67 mL/Hr IV Intermittent (02-27-25 @ 05:59)   166.67 mL/Hr IV Intermittent (02-26-25 @ 22:56)        caspofungin IVPB 50 every 24 hours  piperacillin/tazobactam IVPB.. 4.5 every 8 hours    MEDICATIONS  (STANDING):  acetaminophen   Oral Liquid .. 650 every 6 hours PRN  albuterol/ipratropium for Nebulization 3 every 6 hours  amLODIPine   Tablet 10 daily  apixaban 5 every 12 hours  cloNIDine 0.3 every 8 hours  dextrose 50% Injectable 25 once  dextrose 50% Injectable 12.5 once  dextrose 50% Injectable 25 once  furosemide Solution 40 every 12 hours  gabapentin 200 at bedtime  gabapentin Solution 100 two times a day  glucagon  Injectable 1 once  insulin lispro (ADMELOG) corrective regimen sliding scale  every 6 hours  pantoprazole   Suspension 40 daily  polyethylene glycol 3350 17 daily  QUEtiapine 12.5 at bedtime  senna Syrup 5 at bedtime    SOCIAL HISTORY: hx of smoking (quit 1 week prior to admisison)    FAMILY HISTORY: No pertinent family hx     REVIEW OF SYSTEMS:    CONSTITUTIONAL: No weakness, fevers or chills  EYES:  No visual changes, no eye pain   ENT: No vertigo or throat pain   NECK: No pain or stiffness  RESPIRATORY: No cough, wheezing, hemoptysis; No shortness of breath  CARDIOVASCULAR: No chest pain or palpitations  GASTROINTESTINAL: No abdominal or epigastric pain. No nausea, vomiting, or hematemesis; No diarrhea or constipation. No melena or hematochezia.  GENITOURINARY: No dysuria, frequency or hematuria  NEUROLOGICAL: No numbness or weakness  SKIN: No itching, rashes  Psych: no anxiety or depression     Vital Signs Last 24 Hrs  T(F): 100.4 (03-17-25 @ 08:00), Max: 103 (03-16-25 @ 15:15)  Vital Signs Last 24 Hrs  HR: 112 (03-17-25 @ 08:00) (79 - 140)  BP: 130/76 (03-17-25 @ 08:00) (123/66 - 137/59)  RR: 26 (03-17-25 @ 08:00)  SpO2: 100% (03-17-25 @ 08:00) (91% - 100%)  Wt(kg): --    PHYSICAL EXAM:  Constitutional: non-toxic, no distress. + trach and peg   HEAD/EYES: anicteric, no conjunctival injection  ENT:  supple, no thrush  Cardiovascular:   normal S1, S2, no murmur, no edema  Respiratory:  clear BS bilaterally, no wheezes, no rales  GI:  soft, non-tender  Neurologic: awake and alert  Skin:  no rash, no erythema, no phlebitis                            8.1    12.23 )-----------( 195      ( 17 Mar 2025 06:19 )             28.6   03-17    139  |  99  |  12  ----------------------------<  132[H]  3.8   |  29  |  0.72    Ca    8.3[L]      17 Mar 2025 06:19  Phos  3.6     03-17  Mg     1.90     03-17    Urinalysis (03.17.25 @ 08:32)    Glucose Qualitative, Urine: Negative mg/dL   Blood, Urine: Negative   pH Urine: 5.5   Color: Dark Yellow   Urine Appearance: Cloudy   Bilirubin: Small   Ketone - Urine: Negative mg/dL   Specific Gravity: 1.027   Protein, Urine: 100 mg/dL   Urobilinogen: 1.0 mg/dL   Nitrite: Negative   Leukocyte Esterase Concentration: Trace    Urine Microscopic-Add On (NC) (03.17.25 @ 08:32)    White Blood Cell - Urine: 2 /HPF   Red Blood Cell - Urine: 2 /HPF   Bacteria: Negative /HPF   Cast: 2 /LPF   Epithelial Cells: 2 /HPF    MICROBIOLOGY:Vancomycin Level, Trough: 13.6 (02-28 @ 21:30)  Vancomycin Level, Trough: 15.3 (02-27 @ 13:27)    Culture - Sputum (collected 16 Mar 2025 14:30)  Source: Trach Asp Tracheal Aspirate  Gram Stain (16 Mar 2025 19:41):    Few polymorphonuclear leukocytes per low power field    Rare Squamous epithelial cells per low power field    Rare Gram Positive Rods seen per oil power field    Rare Gram Negative Rods seen per oil power field    Culture - Blood (collected 15 Mar 2025 20:35)  Source: Blood Blood-Venous  Gram Stain (17 Mar 2025 06:44):    Growth in aerobic bottle: Yeast like cells  Preliminary Report (17 Mar 2025 06:44):    Growth in aerobic bottle: Yeast like cells    Culture - Blood (collected 15 Mar 2025 20:20)  Source: Blood Blood-Peripheral  Gram Stain (17 Mar 2025 03:16):    Growth in aerobic bottle: Yeast like cells  Preliminary Report (17 Mar 2025 03:16):    Growth in aerobic bottle: Yeast like cells    Direct identification is available within approximately 3-5    hours either by Blood Panel Multiplexed PCR or Direct    MALDI-TOF. Details: [email or web address]  Organism: Blood Culture PCR (17 Mar 2025 03:26)  Organism: Blood Culture PCR (17 Mar 2025 03:26)      Method Type: PCR      -  Blood PCR Panel: NEG              Rapid RVP Result: NotDetec (03-16 @ 16:35)  Rapid RVP Result: NotDetec (03-12 @ 07:19)      RADIOLOGY:  imaging below personally reviewed and agree with findings

## 2025-03-17 NOTE — CHART NOTE - NSCHARTNOTEFT_GEN_A_CORE
NUTRITION FOLLOW UP NOTE     REASON FOR ASSESSMENT: ICU Follow up / Tube-feeding Follow up     SOURCE:    [x] Medical record [x] RN/PCA  [x] Patient    MEDICAL COURSE:  38 yo male PMHx of morbid obesity, BEA on CPAP, pAFIB (not on AC), HTN, and BL papilledema attributed to pseudotumor cerebri who presented initially to [facility] on 2/24 with SOB and BL LE edema. Found with URI outpatient with progressive SOB, and concern for noted for MFPNA on imaging. Course progressed with AHRF with worsening O2 demand, respiratory distress, secretions, and somnolence requiring intubation (difficult with success after 6 attempts) in ED and admission to [facility] MICU. While in MICU, patient noted with increasing O2 demand with no improvement despite optimal vent management. Concern noted for progressive ARDS, unable to prone given morbid obesity, and ultimately cannulated for vvECMO on 2/25 and transferred to [facility] for further management on 2/26.       DIET PRESCRIPTION:  Diet, NPO with Tube Feed:   Tube Feeding Modality: Gastrostomy  Triptelligent Peptide 1.5 ([id number])  Total Volume for 24 Hours (mL): 1080  Continuous  Starting Tube Feed Rate {mL per Hour}: 35  Increase Tube Feed Rate by (mL): 10     Every 4 hours  Until Goal Tube Feed Rate (mL per Hour): 60  Tube Feed Duration (in Hours): 18  Tube Feed Start Time: 11:00  Tube Feed Stop Time: 05:00  Liquid Protein Supplement     Qty per Day:  4 (03-10-25 @ 10:26)      NUTRITION COURSE:  Nutrition interview: No recent episodes of nausea, vomiting, diarrhea, or constipation. Last BM noted 3/16 per RN flowsheets. Pt noted with swallow evaluation on 3/13- recommending continue NPO at present. Pt remains NPO TF only as sole source of nutrition and hydration; [employer] Peptide 1.5 @ 60ml/hr x18hrs and LPS 4x/day (400kcal, 60gm protein), (1080ml total volume, 2020 kcal (1620 kcal TF, 400 kcal LPS)(30kcal/kg IBW), 140 gm protein (80gm protein TF, 60gm protein LPS)(2.1 gm/kg IBW), 756ml free water from formula. Pt also receiving additional free water flushes per team of 350ml q6hr (4x/day) (2156ml free water total). Pt tolerating TF - continue as same.         PERTINENT MEDICATIONS:  MEDICATIONS  (STANDING):  albuterol/ipratropium for Nebulization 3 milliLiter(s) Nebulizer every 6 hours  amLODIPine   Tablet 10 milliGRAM(s) Oral daily  apixaban 5 milliGRAM(s) Oral every 12 hours  chlorhexidine 0.12% Liquid 15 milliLiter(s) Swish and Spit two times a day  chlorhexidine 2% Cloths 1 Application(s) Topical <User Schedule>  cloNIDine 0.3 milliGRAM(s) Oral every 8 hours  clotrimazole 1% Cream 1 Application(s) Topical two times a day  dextrose 50% Injectable 25 Gram(s) IV Push once  dextrose 50% Injectable 12.5 Gram(s) IV Push once  dextrose 50% Injectable 25 Gram(s) IV Push once  furosemide Solution 40 milliGRAM(s) Oral every 12 hours  gabapentin 200 milliGRAM(s) Oral at bedtime  gabapentin Solution 100 milliGRAM(s) Oral two times a day  glucagon  Injectable 1 milliGRAM(s) IntraMuscular once  insulin lispro (ADMELOG) corrective regimen sliding scale   SubCutaneous every 6 hours  multivitamin/minerals/iron Oral Solution (CENTRUM) 15 milliLiter(s) Oral daily  pantoprazole   Suspension 40 milliGRAM(s) Oral daily  piperacillin/tazobactam IVPB.. 3.375 Gram(s) IV Intermittent <User Schedule>  polyethylene glycol 3350 17 Gram(s) Oral daily  QUEtiapine 12.5 milliGRAM(s) Oral at bedtime  senna Syrup 5 milliLiter(s) Oral at bedtime    MEDICATIONS  (PRN):  acetaminophen   Oral Liquid .. 650 milliGRAM(s) Oral every 6 hours PRN Temp greater or equal to 38C (100.4F), Mild Pain (1 - 3), Moderate Pain (4 - 6)    [x] Relevant notes on medications: Continues on multivitamin w/ minerals once daily for micronutrient and mineral provisions, noted on bowel regimen for BMs.      PERTINENT LABS:  03-17 Na139 mmol/L Glu 132 mg/dL[H] K+ 3.8 mmol/L Cr  0.72 mg/dL BUN 12 mg/dL 03-17 Phos 3.6 mg/dL 03-13 Alb 2.8 g/dL[L] 03-08 Chol --    LDL --    HDL --    Trig 169 mg/dL[H]     A1C with Estimated Average Glucose Result: 6.7 % (02-25-25 @ 05:44)    POCT Blood Glucose.: 121 mg/dL (17 Mar 2025 11:18)  POCT Blood Glucose.: 138 mg/dL (17 Mar 2025 05:27)  POCT Blood Glucose.: 134 mg/dL (16 Mar 2025 23:31)  POCT Blood Glucose.: 124 mg/dL (16 Mar 2025 17:10)    [x] Relevant notes on labs: noted with elevated glucose levels, continues on insulin regimen.     ANTHROPOMETRICS:  Height (cm): 170.2 (02-26 @ 01:48)  Weight (kg): 201 (03-10 @ 18:00), 239.2 (02-25 @ 20:00)  BMI (kg/m2): 69.4 (03-10 @ 18:00)  Weight Assessment: Pt noted with significant wt loss x~2.5 weeks (-12.2%), unclear if admission wt is accurate as Pt current wt trend reflects closer to 430-450#. Wt loss could be attributed to fluid shifts as well. will continue to monitor.     PHYSICAL ASSESSMENT, per flowsheets:  Edema: generalized 1+   Pressure Injury: Right buttocks DTI per wound care note 3/3     ESTIMATED NEEDS:  [X] No change since previous assessment, based on IBW 67kg   22-25 KCals/kg = 0761-4600 KCals/d  1.5-2.0g protein/kg = 100-134g protein/d      PREVIOUS NUTRITION DX: Increased Nutrient Needs...protein r/t Critically ill condition in obese AEB patient on ECMO.  Nutrition Diagnosis is [ ] ongoing  [ x] resolved [ ] not applicable     ***New Nutrition Diagnosis: [x] unintentional wt loss r/t hospitalization AEB 12.2% wt loss x 2 weeks     EDUCATION:  [x ] Not warranted at present    RECOMMENDATIONS/INTERVENTIONS:  1) Continue on current TF regimen: [employer] Peptide @ 60ml/hr x18hrs  2) Continue to provide LPS 4x/day (400kcal, 60 gm protein)  3) Continue to provide multivitamin w/ minerals once daily for micronutrient and mineral provisions   4) RD to f/u prn     MONITORING & EVALUATING:  Tolerance to diet/supplement, nutrition related lab values, weight trends, BMs/GI distress, hydration status, skin integrity.    Dahiana Fofana MS, RDN | Available on [employer] | Office #[phone]

## 2025-03-17 NOTE — PROGRESS NOTE ADULT - ASSESSMENT
40 YO M with PMHx of morbid obesity, BEA on CPAP, pAFIB (not on AC), HTN, and BL papilledema attributed to pseudotumor cerebri who presented initially to [facility] on 2/24 with SOB and BL LE edema. Found with URI outpatient with progressive SOB, and concern for noted for MFPNA on imaging. Course progressed with AHRF with worsening O2 demand, respiratory distress, secretions, and somnolence requiring intubation (difficult with success after 6 attempts) in ED and admission to [facility] MICU. While in MICU, patient noted with increasing O2 demand with no improvement despite optimal vent management. Concern noted for progressive ARDS, unable to prone given morbid obesity, and ultimately cannulated for vvECMO on 2/25 and transferred to [facility] for further management on 2/26. While at [facility], tx with diuresis and ABX, and ultimately decannulated and s/p 60XLTCP trach and PEG on 3/7. Patient catie transferred to RCU on 3/11 and course complicated by recurrent high grade fevers.     NEUROLOGY  # Hx of Pseudotumor Cerebri   - s/p LP in 2024 with high opening pressure  - s/p MRI 2024 with possible pituitary mass but unclear because of pressure effect from pseudotumor cerebri causing partially empty sella.  - Prolactin elevated   - ACTH low but recieved steroids during admission   - FT4 low normal and TSH normal, but given FT4 low normal TSH should be higher   - Discuss endocrine consult for inhouse work up vs outpatient.     # Sedation   - Weaned off sedation in ICU   - Nimbex turned off 2/27   - Prop turned off 3/9   - Precedex turned off and catapres started 3/11, however persistent fever spikes noted with concern for precedex withdrawal?   - Continue on catapres 0.3 TID (3/16 - )   - Continue on seroquel 12.5 QHS     # BL LE neuropathy   - CIPN concern   - Continue on neurontin 100 BID and 200 QHS  - Monitor pain    CARDIOVASCULAR  # HTN   - HTN noted in ICU requiring cardene and esmolol GTTs   - Lisinopril dc'ed to increase catapres   - Continue on norvasc 10 and catapres 0.2 TID   - Monitor BP     # AFIB   - Hx of pAFIB   - No RVR episodes or pAFIB episodes in ICU   - No rate control medications needed  - Monitor on telemetry     # RV dilation second to PHTN   - POCUS on arrival to [facility] with RVE concern   - TTE 10/2024 with EF 55-60 with normal LVSF, moderate LVH and normal RVSF and size with no concern for pHTN   - TTE on 2/25 at  with EF 61 and normal LVSF, but enlarged RV size with reduced RVSF, mild TR, mild PHTN with PASP 49, and dilated IVC to 3.4cn, but normal TAPSE 2.1.  - Continue on aggressive diuresis in ICU    - TTE 3/11 with RVSF reduced with TAPSE 1.6. IVC improved to 2.12cm.   - Continue on lasix 40 PO BID   - Monitor IO    RESPIRATORY  # ARDS second to MFPNA   - Hx of BEA on CPAP presented to  post URI with SOB and found with AHRF with progression to ARDS second to post viral MFPNA   - Intubated 2/25   - Cannulated for vvECMO 2/26   - s/p 60XLTCP tracheostomy 3/7   - Decannulated 3/7   - Continue on TC QD with PMV as able with strong phonation  - Continue on PS 12/5 QHS   - Continue on nebs and HTS   - CTSx to remove tracheostomy and ECMO sutures on 3/17     GI  # Dysphagia   - s/p PEG 3/7   - Attempted green dye on 3/13 and failed  - Continue on TF  - Continue on miralax and senna  - Reattempt green dye next week     # Mild transaminitis   - LFTs elevated in ICU with TBILI elevated likely from ECMO hemolysis   - US ABD with hepatic steatosis   - Trend LFTs      RENAL  # ELLA   - ELLA likely from cardiorenal with RVE   - Diuresed in ICU and improved   - Monitor renal function and UOP     # Hypernatremia   - Continue on  Q6H (decreased 3/16)    INFECTIOUS DISEASE  # Recurrent fevers with infxn vs precedex withdrawal   - Recurrent fevers post ECMO decannulation thought initially to be cytokine surge   - BCx, SCx, UCx, RVP and MRSA RPT on 3/12 negative   - Despite negative cx, WBC rising, and zosyn resumed (3/12 - ) with improved WBC , however recurrent fevers noted.   - Increasing catapres as able.  - Check RPT PCT, BCx, SCx, UA, RVP and CXR .      # MFPNA and abdominal pannus cellulitis   - Presented to HH post URI with SOB and found with AHRF with progression to ARDS second to post viral MFPNA   - RVP, legionella, strept, BAL, BCx, UCx, HIV, PCP, and adenovirus PCR negative   - Initially started on multiple agents in ICU and ultimatelty completed zosyn (2/26-3/9) ZAMX (2/25-2/26) and vanco (2/26-3/1)     # Penile discharge   - GC/ chlamydia negative   - HIV negative   - Monitor for now    # PPX   - MRSA PCR positive and completed bactroban (2/26-3/3)     HEME   # Anemia and thrombocytopenia   - Likely second to shearing with ECMO circuit   - Monitor HH and PLTs post decannulation and on agratroban     VASCULAR   # R IJ and BL LE DVTs   - Post ECMO dopplers on 3/9 negative for BL UE/ LE DVTs.   - Subsequent post ECMO dopplers performed on 3/14 and noted with acute, non-occlusive deep vein thrombosis noted within the right internal jugular vein. acute, occlusive deep vein thromboses noted within the right and left peroneal veins at both mid calves, and age-indeterminate, occlusive deep vein thrombosis visualized within the left gastrocnemius vein at the proximal calf.     - Continue on argatroban GTT and change to eliquis today     ENDOCRINE  # Pre-DM2   - A1C 6.7  - Continue on ISS   - Monitor FS   - IF no demand then stop ISS     LINES/ TUBES  - R IJ and R FEM ECMO (2/26-3/7)     SKIN  # Pannus canndial intertrigo   - Continue on clotrimazole cream   - WOC appreciated     ETHICS/ GOC    - FULL CODE     DISPO - PT and PMR pending   38 YO M with PMHx of morbid obesity, BEA on CPAP, pAFIB (not on AC), HTN, and BL papilledema attributed to pseudotumor cerebri who presented initially to [facility] on 2/24 with SOB and BL LE edema. Found with URI outpatient with progressive SOB, and concern for noted for MFPNA on imaging. Course progressed with AHRF with worsening O2 demand, respiratory distress, secretions, and somnolence requiring intubation (difficult with success after 6 attempts) in ED and admission to [facility] MICU. While in MICU, patient noted with increasing O2 demand with no improvement despite optimal vent management. Concern noted for progressive ARDS, unable to prone given morbid obesity, and ultimately cannulated for vvECMO on 2/25 and transferred to [facility] for further management on 2/26. While at [facility], tx with diuresis and ABX, and ultimately decannulated and s/p 60XLTCP trach and PEG on 3/7. Patient catie transferred to RCU on 3/11 and course complicated by recurrent high grade fevers and found with fungemia.    NEUROLOGY  # Hx of Pseudotumor Cerebri   - s/p LP in 2024 with high opening pressure  - s/p MRI 2024 with possible pituitary mass but unclear because of pressure effect from pseudotumor cerebri causing partially empty sella.  - Prolactin elevated   - ACTH low but recieved steroids during admission   - FT4 low normal and TSH normal, but given FT4 low normal TSH should be higher   - Discuss endocrine consult for inhouse work up vs outpatient.     # Sedation   - Weaned off sedation in ICU   - Nimbex turned off 2/27   - Prop turned off 3/9   - Precedex turned off and catapres started 3/11, however persistent fever spikes noted with concern for precedex withdrawal?   - Continue on catapres 0.3 TID (3/16 - )   - Continue on seroquel 12.5 QHS     # BL LE neuropathy   - CIPN concern   - Continue on neurontin 100 BID and 200 QHS  - Monitor pain    CARDIOVASCULAR  # HTN   - HTN noted in ICU requiring cardene and esmolol GTTs   - Lisinopril dc'ed to increase catapres   - Continue on norvasc 10 and catapres 0.3 TID   - Monitor BP     # AFIB   - Hx of pAFIB   - No RVR episodes or pAFIB episodes in ICU   - No rate control medications needed  - Monitor on telemetry     # RV dilation second to PHTN   - POCUS on arrival to [facility] with RVE concern   - TTE 10/2024 with EF 55-60 with normal LVSF, moderate LVH and normal RVSF and size with no concern for pHTN   - TTE on 2/25 at  with EF 61 and normal LVSF, but enlarged RV size with reduced RVSF, mild TR, mild PHTN with PASP 49, and dilated IVC to 3.4cn, but normal TAPSE 2.1.  - Continue on aggressive diuresis in ICU    - TTE 3/11 with RVSF reduced with TAPSE 1.6. IVC improved to 2.12cm.   - Continue on lasix 40 PO BID   - Monitor IO    RESPIRATORY  # ARDS second to MFPNA   - Hx of BEA on CPAP presented to  post URI with SOB and found with AHRF with progression to ARDS second to post viral MFPNA   - Intubated 2/25   - Cannulated for vvECMO 2/26   - s/p 60XLTCP tracheostomy 3/7   - Decannulated 3/7   - CTSx removed tracheostomy and ECMO sutures on 3/17   - Continue on TC QD with PMV as able with strong phonation  - Continue on PS 12/5 QHS given OHS  - Eventually when able to tolerate  will need to trial on BIPAP QHS  - Continue on nebs and HTS     GI  # Dysphagia   - s/p PEG 3/7   - Attempted green dye on 3/13 and failed  - Continue on TF  - Continue on miralax and senna  - Reattempt green dye this week     # Mild transaminitis   - LFTs elevated in ICU with TBILI elevated likely from ECMO hemolysis   - US ABD with hepatic steatosis   - Trend LFTs      RENAL  # ELLA   - ELLA likely from cardiorenal with RVE   - Diuresed in ICU and improved   - Monitor renal function and UOP     # Hypernatremia   - Continue on  Q6H (decreased 3/16)    INFECTIOUS DISEASE  # Recurrent fevers with fungemia from unknown source   - Recurrent fevers post ECMO decannulation thought initially to be cytokine surge   - BCx, SCx, UCx, RVP and MRSA RPT on 3/12 negative   - Continued zosyn empirically with improved WBC , however recurrent fevers noted.   - RPT SCx, UA, CXR and RVP negative  - BCx 3/16 with yeast.   - Continue on zosyn (3/12-3/18) empirically   - Continue on caspo (3/17 - )   - Pending panCT and TTE   - Pending RPT BCx 3/18   - ID following    # MFPNA and abdominal pannus cellulitis   - Presented to HH post URI with SOB and found with AHRF with progression to ARDS second to post viral MFPNA   - RVP, legionella, strept, BAL, BCx, UCx, HIV, PCP, and adenovirus PCR negative   - Initially started on multiple agents in ICU and ultimatelty completed zosyn (2/26-3/9) ZMAX (2/25-2/26) and vanco (2/26-3/1)     # Penile discharge   - GC/ chlamydia negative   - HIV negative   - Monitor for now    # PPX   - MRSA PCR positive and completed bactroban (2/26-3/3)     HEME   # Anemia and thrombocytopenia   - Likely second to shearing with ECMO circuit   - Monitor HH and PLTs post decannulation and on agratroban     VASCULAR   # R IJ and BL LE DVTs   - Post ECMO dopplers on 3/9 negative for BL UE/ LE DVTs.   - Subsequent post ECMO dopplers performed on 3/14 and noted with acute, non-occlusive deep vein thrombosis noted within the right internal jugular vein. acute, occlusive deep vein thromboses noted within the right and left peroneal veins at both mid calves, and age-indeterminate, occlusive deep vein thrombosis visualized within the left gastrocnemius vein at the proximal calf.     - Continue on argatroban GTT and change to eliquis today     ENDOCRINE  # Pre-DM2   - A1C 6.7  - Continue on ISS   - Monitor FS   - IF no demand then stop ISS     LINES/ TUBES  - R IJ and R FEM ECMO (2/26-3/7)     SKIN  # Pannus canndial intertrigo   - Continue on clotrimazole cream   - WOC appreciated     ETHICS/ GOC    - FULL CODE     DISPO - PT and PMR recc [facility]OR[facility], however will continue to reassess for acute   38 YO M with PMHx of morbid obesity, BEA on CPAP, pAFIB (not on AC), HTN, and BL papilledema attributed to pseudotumor cerebri who presented initially to [facility] on 2/24 with SOB and BL LE edema. Found with URI outpatient with progressive SOB, and concern for noted for MFPNA on imaging. Course progressed with AHRF with worsening O2 demand, respiratory distress, secretions, and somnolence requiring intubation (difficult with success after 6 attempts) in ED and admission to [facility] MICU. While in MICU, patient noted with increasing O2 demand with no improvement despite optimal vent management. Concern noted for progressive ARDS, unable to prone given morbid obesity, and ultimately cannulated for vvECMO on 2/25 and transferred to [facility] for further management on 2/26. While at [facility], tx with diuresis and ABX, and ultimately decannulated and s/p 60XLTCP trach and PEG on 3/7. Patient catie transferred to RCU on 3/11 and course complicated by recurrent high grade fevers and found with fungemia.    NEUROLOGY  # Hx of Pseudotumor Cerebri   - s/p LP in 2024 with high opening pressure  - s/p MRI 2024 with possible pituitary mass but unclear because of pressure effect from pseudotumor cerebri causing partially empty sella.  - Prolactin elevated   - ACTH low but recieved steroids during admission   - FT4 low normal and TSH normal, but given FT4 low normal TSH should be higher   - Discuss endocrine consult for inhouse work up vs outpatient.     # Sedation   - Weaned off sedation in ICU   - Nimbex turned off 2/27   - Prop turned off 3/9   - Precedex turned off and catapres started 3/11, however persistent fever spikes noted with concern for precedex withdrawal?   - Continue on catapres 0.3 TID (3/16 - )   - Continue on seroquel 12.5 QHS     # BL LE neuropathy   - CIPN concern   - Continue on neurontin 100 BID and 200 QHS  - Monitor pain    CARDIOVASCULAR  # HTN   - HTN noted in ICU requiring cardene and esmolol GTTs   - Lisinopril dc'ed to increase catapres   - Continue on norvasc 10 and catapres 0.3 TID   - Monitor BP     # AFIB   - Hx of pAFIB   - No RVR episodes or pAFIB episodes in ICU   - No rate control medications needed  - Monitor on telemetry     # RV dilation second to PHTN   - POCUS on arrival to [facility] with RVE concern   - TTE 10/2024 with EF 55-60 with normal LVSF, moderate LVH and normal RVSF and size with no concern for pHTN   - TTE on 2/25 at  with EF 61 and normal LVSF, but enlarged RV size with reduced RVSF, mild TR, mild PHTN with PASP 49, and dilated IVC to 3.4cn, but normal TAPSE 2.1.  - Continue on aggressive diuresis in ICU    - TTE 3/11 with RVSF reduced with TAPSE 1.6. IVC improved to 2.12cm.   - Continue on lasix 40 PO BID   - Monitor IO    RESPIRATORY  # ARDS second to MFPNA   - Hx of BEA on CPAP presented to  post URI with SOB and found with AHRF with progression to ARDS second to post viral MFPNA   - Intubated 2/25   - Cannulated for vvECMO 2/26   - s/p 60XLTCP tracheostomy 3/7   - Decannulated 3/7   - CTSx removed tracheostomy and ECMO sutures on 3/17   - Continue on TC QD with PMV as able with strong phonation  - Continue on PS 12/5 QHS given OHS  - Eventually when able to tolerate  will need to trial on BIPAP QHS  - Continue on nebs and HTS     GI  # Dysphagia   - s/p PEG 3/7   - Attempted green dye on 3/13 and failed  - Continue on TF  - Continue on miralax and senna  - Reattempt green dye this week     # Mild transaminitis   - LFTs elevated in ICU with TBILI elevated likely from ECMO hemolysis   - US ABD with hepatic steatosis   - Trend LFTs      RENAL  # ELLA   - ELLA likely from cardiorenal with RVE   - Diuresed in ICU and improved   - Monitor renal function and UOP     # Hypernatremia   - Continue on  Q6H (decreased 3/16)    INFECTIOUS DISEASE  # Recurrent fevers with fungemia from unknown source   - Recurrent fevers post ECMO decannulation thought initially to be cytokine surge   - BCx, SCx, UCx, RVP and MRSA RPT on 3/12 negative   - Continued zosyn empirically with improved WBC , however recurrent fevers noted.   - RPT SCx, UA, CXR and RVP negative  - BCx 3/16 with yeast.   - Continue on zosyn (3/12-3/18) empirically   - Continue on caspo (3/17 - )   - Pending panCT and TTE   - Pending RPT BCx 3/18   - ID following    # MFPNA and abdominal pannus cellulitis   - Presented to  post URI with SOB and found with AHRF with progression to ARDS second to post viral MFPNA   - RVP, legionella, strept, BAL, BCx, UCx, HIV, PCP, and adenovirus PCR negative   - Initially started on multiple agents in ICU and ultimatelty completed zosyn (2/26-3/9) ZMAX (2/25-2/26) and vanco (2/26-3/1)     # Penile discharge   - GC/ chlamydia negative   - HIV negative   - Monitor for now    # PPX   - MRSA PCR positive and completed bactroban (2/26-3/3)     HEME   # Anemia and thrombocytopenia   - Likely second to shearing with ECMO circuit   - Monitor HH and PLTs post decannulation and on agratroban     VASCULAR   # R IJ and BL LE DVTs   - Post ECMO dopplers on 3/9 negative for BL UE/ LE DVTs.   - Subsequent post ECMO dopplers performed on 3/14 and noted with acute, non-occlusive deep vein thrombosis noted within the right internal jugular vein. acute, occlusive deep vein thromboses noted within the right and left peroneal veins at both mid calves, and age-indeterminate, occlusive deep vein thrombosis visualized within the left gastrocnemius vein at the proximal calf.     - Continue on argatroban GTT and change to eliquis today     PODIATRY   # BL plantar feet blisters   - Seen by podiatry and blisters lanced on 3/4   - Continue on local wound care per podiatry   - Podiatry following    ENDOCRINE  # Pre-DM2   - A1C 6.7  - Continue on ISS   - Monitor FS   - IF no demand then stop ISS     LINES/ TUBES  - R IJ and R FEM ECMO (2/26-3/7)     SKIN  # Pannus canndial intertrigo   - Continue on clotrimazole cream   - WOC appreciated     ETHICS/ GOC    - FULL CODE     DISPO - PT and PMR recc [place]OR[place], however will continue to reassess for acute   40 YO M with PMHx of morbid obesity, BEA on CPAP, pAFIB (not on AC), HTN, and BL papilledema attributed to pseudotumor cerebri who presented initially to [facility] on 2/24 with SOB and BL LE edema. Found with URI outpatient with progressive SOB, and concern for noted for MFPNA on imaging. Course progressed with AHRF with worsening O2 demand, respiratory distress, secretions, and somnolence requiring intubation (difficult with success after 6 attempts) in ED and admission to [facility] MICU. While in MICU, patient noted with increasing O2 demand with no improvement despite optimal vent management. Concern noted for progressive ARDS, unable to prone given morbid obesity, and ultimately cannulated for vvECMO on 2/25 and transferred to [facility] for further management on 2/26. While at [facility], tx with diuresis and ABX, and ultimately decannulated and s/p 60XLTCP trach and PEG on 3/7. Patient catie transferred to RCU on 3/11 and course complicated by recurrent high grade fevers and found with fungemia.    NEUROLOGY  # Hx of Pseudotumor Cerebri   - s/p LP in 2024 with high opening pressure  - s/p MRI 2024 with possible pituitary mass but unclear because of pressure effect from pseudotumor cerebri causing partially empty sella.  - Prolactin elevated   - ACTH low but recieved steroids during admission   - FT4 low normal and TSH normal, but given FT4 low normal TSH should be higher   - Discuss endocrine consult for inhouse work up vs outpatient.     # Sedation   - Weaned off sedation in ICU   - Nimbex turned off 2/27   - Prop turned off 3/9   - Precedex turned off and catapres started 3/11, however persistent fever spikes noted with concern for precedex withdrawal?   - Continue on catapres 0.3 TID (3/16 - )   - Continue on seroquel 12.5 QHS     # BL LE neuropathy   - CIPN concern   - Continue on neurontin 100 BID and 200 QHS  - Monitor pain    CARDIOVASCULAR  # HTN   - HTN noted in ICU requiring cardene and esmolol GTTs   - Lisinopril dc'ed to increase catapres   - Continue on norvasc 10 and catapres 0.3 TID   - Monitor BP     # AFIB   - Hx of pAFIB   - No RVR episodes or pAFIB episodes in ICU   - No rate control medications needed  - Monitor on telemetry     # RV dilation second to PHTN   - POCUS on arrival to [facility] with RVE concern   - TTE 10/2024 with EF 55-60 with normal LVSF, moderate LVH and normal RVSF and size with no concern for pHTN   - TTE on 2/25 at  with EF 61 and normal LVSF, but enlarged RV size with reduced RVSF, mild TR, mild PHTN with PASP 49, and dilated IVC to 3.4cn, but normal TAPSE 2.1.  - Continue on aggressive diuresis in ICU    - TTE 3/11 with RVSF reduced with TAPSE 1.6. IVC improved to 2.12cm.   - Continue on lasix 40 PO BID   - Monitor IO    RESPIRATORY  # ARDS second to MFPNA   - Hx of BEA on CPAP presented to  post URI with SOB and found with AHRF with progression to ARDS second to post viral MFPNA   - Intubated 2/25   - Cannulated for vvECMO 2/26   - s/p 60XLTCP tracheostomy 3/7   - Decannulated 3/7   - CTSx removed tracheostomy and ECMO sutures on 3/17   - Continue on TC QD with PMV as able with strong phonation  - Continue on PS 12/5 QHS given OHS  - Eventually when able to tolerate  will need to trial on BIPAP QHS  - Continue on nebs and HTS     GI  # Dysphagia   - s/p PEG 3/7   - Attempted green dye on 3/13 and failed  - Continue on TF  - Continue on miralax and senna  - Reattempt green dye this week     # Mild transaminitis   - LFTs elevated in ICU with TBILI elevated likely from ECMO hemolysis   - US ABD with hepatic steatosis   - Trend LFTs      RENAL  # ELLA   - ELLA likely from cardiorenal with RVE   - Diuresed in ICU and improved   - Monitor renal function and UOP     # Hypernatremia   - Continue on  Q6H (decreased 3/16)    INFECTIOUS DISEASE  # Recurrent fevers with fungemia from unknown source   - Recurrent fevers post ECMO decannulation thought initially to be cytokine surge   - BCx, SCx, UCx, RVP and MRSA RPT on 3/12 negative   - Continued zosyn empirically with improved WBC , however recurrent fevers noted.   - RPT SCx, UA, CXR and RVP negative  - BCx 3/16 with yeast.   - Continue on zosyn (3/12-3/18) empirically   - Continue on caspo (3/17 - )   - Pending panCT and TTE   - Pending RPT BCx 3/18   - ID following    # MFPNA and abdominal pannus cellulitis   - Presented to HH post URI with SOB and found with AHRF with progression to ARDS second to post viral MFPNA   - RVP, legionella, strept, BAL, BCx, UCx, HIV, PCP, and adenovirus PCR negative   - Initially started on multiple agents in ICU and ultimatelty completed zosyn (2/26-3/9) ZMAX (2/25-2/26) and vanco (2/26-3/1)     # Penile discharge   - GC/ chlamydia negative   - HIV negative   - Monitor for now    # PPX   - MRSA PCR positive and completed bactroban (2/26-3/3)     HEME   # Anemia and thrombocytopenia   - Likely second to shearing with ECMO circuit   - Monitor HH and PLTs post decannulation and on agratroban     VASCULAR   # R IJ and BL LE DVTs   - Post ECMO dopplers on 3/9 negative for BL UE/ LE DVTs.   - Subsequent post ECMO dopplers performed on 3/14 and noted with acute, non-occlusive deep vein thrombosis noted within the right internal jugular vein. acute, occlusive deep vein thromboses noted within the right and left peroneal veins at both mid calves, and age-indeterminate, occlusive deep vein thrombosis visualized within the left gastrocnemius vein at the proximal calf.     - Continue on argatroban GTT and change to eliquis today     PODIATRY   # BL plantar feet blisters likely pressors induced  - Seen by podiatry and blisters lanced on 3/4   - Continue on local wound care per podiatry   - Podiatry following    ENDOCRINE  # Pre-DM2   - A1C 6.7  - Continue on ISS   - Monitor FS   - IF no demand then stop ISS     LINES/ TUBES  - R IJ and R FEM ECMO (2/26-3/7)     SKIN  # Pannus canndial intertrigo   - Continue on clotrimazole cream   - WOC appreciated     ETHICS/ GOC    - FULL CODE     DISPO - PT and PMR recc [place]OR[place], however will continue to reassess for acute   38 YO M with PMHx of morbid obesity, BEA on CPAP, pAFIB (not on AC), HTN, and BL papilledema attributed to pseudotumor cerebri who presented initially to [facility] on 2/24 with SOB and BL LE edema. Found with URI outpatient with progressive SOB, and concern for noted for MFPNA on imaging. Course progressed with AHRF with worsening O2 demand, respiratory distress, secretions, and somnolence requiring intubation (difficult with success after 6 attempts) in ED and admission to [facility] MICU. While in MICU, patient noted with increasing O2 demand with no improvement despite optimal vent management. Concern noted for progressive ARDS, unable to prone given morbid obesity, and ultimately cannulated for vvECMO on 2/25 and transferred to [facility] for further management on 2/26. While at [facility], tx with diuresis and ABX, and ultimately decannulated and s/p 60XLTCP trach and PEG on 3/7. Patient catie transferred to RCU on 3/11 and course complicated by recurrent high grade fevers and found with fungemia.    NEUROLOGY  # Hx of Pseudotumor Cerebri   - s/p LP in 2024 with high opening pressure  - s/p MRI 2024 with possible pituitary mass but unclear because of pressure effect from pseudotumor cerebri causing partially empty sella.  - Prolactin elevated   - ACTH low but recieved steroids during admission   - FT4 low normal and TSH normal, but given FT4 low normal TSH should be higher   - Discuss endocrine consult for inhouse work up vs outpatient.     # Sedation   - Weaned off sedation in ICU   - Nimbex turned off 2/27   - Prop turned off 3/9   - Precedex turned off and catapres started 3/11, however persistent fever spikes noted with concern for precedex withdrawal?   - Continue on catapres 0.3 TID (3/16 - )   - Continue on seroquel 12.5 QHS     # BL LE neuropathy   - CIPN concern   - Continue on neurontin 100 BID and 200 QHS  - Monitor pain    CARDIOVASCULAR  # HTN   - HTN noted in ICU requiring cardene and esmolol GTTs   - Lisinopril dc'ed to increase catapres   - Continue on norvasc 10 and catapres 0.3 TID   - Monitor BP     # AFIB   - Hx of pAFIB   - No RVR episodes or pAFIB episodes in ICU   - No rate control medications needed  - Monitor on telemetry     # RV dilation second to PHTN   - POCUS on arrival to [facility] with RVE concern   - TTE 10/2024 with EF 55-60 with normal LVSF, moderate LVH and normal RVSF and size with no concern for pHTN   - TTE on 2/25 at  with EF 61 and normal LVSF, but enlarged RV size with reduced RVSF, mild TR, mild PHTN with PASP 49, and dilated IVC to 3.4cn, but normal TAPSE 2.1.  - Continue on aggressive diuresis in ICU    - TTE 3/11 with RVSF reduced with TAPSE 1.6. IVC improved to 2.12cm.   - Continue on lasix 40 PO BID   - Monitor IO    RESPIRATORY  # ARDS second to MFPNA   - Hx of BEA on CPAP presented to  post URI with SOB and found with AHRF with progression to ARDS second to post viral MFPNA   - Intubated 2/25   - Cannulated for vvECMO 2/26   - s/p 60XLTCP tracheostomy 3/7   - Decannulated 3/7   - CTSx removed tracheostomy and ECMO sutures on 3/17   - Continue on TC QD with PMV as able with strong phonation  - Continue on PS 12/5 QHS given OHS  - Eventually when able to tolerate  will need to trial on BIPAP QHS  - Continue on nebs and HTS     GI  # Dysphagia   - s/p PEG 3/7   - Attempted green dye on 3/13 and failed  - Continue on TF  - Continue on miralax and senna  - Reattempt green dye this week     # Mild transaminitis   - LFTs elevated in ICU with TBILI elevated likely from ECMO hemolysis   - US ABD with hepatic steatosis   - Trend LFTs      RENAL  # ELLA   - ELLA likely from cardiorenal with RVE   - Diuresed in ICU and improved   - Monitor renal function and UOP     # Hypernatremia   - Continue on  Q6H (decreased 3/16)    INFECTIOUS DISEASE  # Recurrent fevers with fungemia from unknown source   - Recurrent fevers post ECMO decannulation thought initially to be cytokine surge   - BCx, SCx, UCx, RVP and MRSA RPT on 3/12 negative   - Continued zosyn empirically with improved WBC , however recurrent fevers noted.   - RPT SCx, UA, CXR and RVP negative  - BCx 3/16 with yeast.   - TTE 3/17 with no IE  - PanCT 3/17 with PNA and persistent panniculitis   - Continue on zosyn (3/12-3/18) empirically   - Continue on caspo (3/17 - )   - Pending wound care for abdominal pannus   - Pending RPT BCx 3/18   - ID following    # MFPNA and abdominal pannus cellulitis   - Presented to HH post URI with SOB and found with AHRF with progression to ARDS second to post viral MFPNA   - RVP, legionella, strept, BAL, BCx, UCx, HIV, PCP, and adenovirus PCR negative   - Initially started on multiple agents in ICU and ultimatelty completed zosyn (2/26-3/9) ZMAX (2/25-2/26) and vanco (2/26-3/1)     # Penile discharge   - GC/ chlamydia negative   - HIV negative   - Monitor for now    # PPX   - MRSA PCR positive and completed bactroban (2/26-3/3)     HEME   # Anemia and thrombocytopenia   - Likely second to shearing with ECMO circuit   - Monitor HH and PLTs post decannulation and on agratroban     VASCULAR   # R IJ and BL LE DVTs   - Post ECMO dopplers on 3/9 negative for BL UE/ LE DVTs.   - Subsequent post ECMO dopplers performed on 3/14 and noted with acute, non-occlusive deep vein thrombosis noted within the right internal jugular vein. acute, occlusive deep vein thromboses noted within the right and left peroneal veins at both mid calves, and age-indeterminate, occlusive deep vein thrombosis visualized within the left gastrocnemius vein at the proximal calf.     - Continue on argatroban GTT and change to eliquis today     PODIATRY   # BL plantar feet blisters likely pressors induced  - Seen by podiatry and blisters lanced on 3/4   - Continue on local wound care per podiatry   - Podiatry following    ENDOCRINE  # Pre-DM2   - A1C 6.7  - Continue on ISS   - Monitor FS   - IF no demand then stop ISS     LINES/ TUBES  - R IJ and R FEM ECMO (2/26-3/7)     SKIN  # Pannus canndial intertrigo   - Continue on clotrimazole cream   - WOC appreciated     ETHICS/ GOC    - FULL CODE     DISPO - PT and PMR recc [facility], however will continue to reassess for acute

## 2025-03-17 NOTE — CHART NOTE - NSCHARTNOTEFT_GEN_A_CORE
Pt seen in RCU  ECMO sutures and Trach sutures removed  PEG site c/d/i  Care per RCU team  Recall with questions or concerns.

## 2025-03-17 NOTE — CONSULT NOTE ADULT - TIME BILLING
Reviewing the chart, interpreting lab data, discussing case with fellow, interview and examination of patient, and documentation. Pt is at high risk of mortality due to his disease.
high complexity of this case
Time spent for extensive review of the physical chart, electronic medical record, and documentation to obtain collateral information including but not limited to:  [x ] Inpatient records (ED, H&P, primary team, and consultants if applicable, care coordination)  [x] Inpatient values/results (biomarkers, immunoassays, imaging, and microbiology results)  [x] Current or proposed treatment plans  [x] Discussion with the primary team  [x] Discussion with the patient, surrogate decision maker, or family    Time spent: >75 mins

## 2025-03-17 NOTE — CONSULT NOTE ADULT - SUBJECTIVE AND OBJECTIVE BOX
Patient is a 39y old  Male who presents with a chief complaint of sob (02 Mar 2025 06:19)      HPI:  40 yo M w/ class III obesity, pAfib (no AC), HTN, BEA (on CPAP), history of b/l papilledema attributed to pseudotumor cerebri s/p LP in  with very high opening pressure, who is transferred for VV ECMO for ARDS and severe hypoxia. Patient initially presented to [place] on  for SOB and b/l LE edema. 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 He is a current every day smoker of Newports and marijuana, but does not drink or do other drugs. Currently lives with his mother. There is a long term partner in his life, but they are not , and they have an on/off relationship currently not very involved with each other as per the patient's mother and aunt.  At [facility] where he was getting an eval last year for shortness of breath, he had a sleep study and was diagnosed with sleep apnea, prescribed a PAP machine, which he has in his room but the mother is not sure how many nights per week he uses it. Recently, the last day or two, his mother and brother have been under the weather with URIs and the patient 2 days ago started to develop a ruynny nose, ough, some wheezing of the chest, as well as worsening shortness of breath and fevers at home. He called his aunt who told him to go get checked out and then he landed at the [place] ED. Patient found to be reportedly hypoxemic to 70% on RA with worsening respiratory status/secretions and somnolence in the ED. Patient was intubated with difficulty (7 attempts) due to very large tongue secretions. Despite optimal vent management, patient remained hypoxemic. Unable to prone due to obesity. Patient cannulated for VV ECMO on  and transferred to [facility] for further management.     [place] labs notable for MRSA PCR -, Fluvid -, urine legionella -, sputum gram stain  with GPC and GPR    CTA chest on  negative for pulmonary embolism, notable for patchy bilateral lower lobe opacities, hepatomegaly, mild ascites, edema/induration of the abdominal pannus.    LE duplex on  negative for DVT    TTE on  showed LV EF 61%, enlarged RV with TAPSE 2.1cm, ePASP at least 49 (Patient had 10/2024 TTE with normal LV and RV with ePASP 14).    Only home med is Lasix. Not on acetazolamide for pseudotumor or on Wegovy (was pending auth) (2025 01:48)    s/p trach and peg on 3/7.  in RCU  febrile    REVIEW OF SYSTEMS  Constitutional - No fever, No weight loss, No fatigue  HEENT - No eye pain, No visual disturbances, No difficulty hearing, No tinnitus, No vertigo, No neck pain  Respiratory - No cough, No wheezing, No shortness of breath  Cardiovascular - No chest pain, No palpitations  Gastrointestinal - No abdominal pain, No nausea, No vomiting, No diarrhea, No constipation  Genitourinary - No dysuria, No frequency, No hematuria, No incontinence  Neurological - No headaches, No memory loss, No loss of strength, No numbness, No tremors  Skin - No itching, No rashes, No lesions   Endocrine - No temperature intolerance  Musculoskeletal - No joint pain, No joint swelling, No muscle pain  Psychiatric - No depression, No anxiety    PAST MEDICAL & SURGICAL HISTORY  HTN (hypertension)    Atrial fibrillation    Papilledema of both eyes    Chronic sinusitis    Morbid obesity    No significant past surgical history        SOCIAL HISTORY  Smoking - cigarette smoker  EtOH - Denied   Drugs - marijuana use    FUNCTIONAL HISTORY  Lives with parents  Independent at baseline, works at [employer]    CURRENT FUNCTIONAL STATUS  3/16 Bed Mobility  Bed Mobility Training Treatment not Performed: patient refused treatment,  Patient deferred due to having fever and not wanting to get out of bed.    Therapeutic Exercise  Therapeutic Exercise Rehab Effort: good  Therapeutic Exercise Symptoms Noted During/After Treatment: none  Therapeutic Exercise Detail: Patient performed supine therapeutic exercises for upper and lower extremity 2 x 10.            RECENT LABS/IMAGING  < from: Xray Chest 1 View- PORTABLE-Urgent (Xray Chest 1 View- PORTABLE-Urgent .) (25 @ 15:14) >    ACC: [id number] EXAM:  XR CHEST PORTABLE URGENT 1V   ORDERED BY: AUGUST SANCHEZ     PROCEDURE DATE:  2025          INTERPRETATION:  CLINICAL INFORMATION: Post tracheostomy    TIME OF EXAMINATION: 2025 at 2:40 PM    EXAM: Portable chest    FINDINGS:    The tracheostomy tube is in position and there is no subcutaneous air,   pneumomediastinum or pneumothorax.  Only partial visualization of the lower lung fields but there is haziness   on the right suggesting layering effusion unchanged.  ECMO catheter in place.        COMPARISON:  at 11:56 AM        IMPRESSION: Tracheostomy tube in position.    --- End of Report ---            PATRICK PACHECO MD; Attending Radiologist  This document has been electronically signed. Mar  7 2025  3:23PM    < end of copied text >    CBC Full  -  ( 17 Mar 2025 06:19 )  WBC Count : 12.23 K/uL  RBC Count : 3.35 M/uL  Hemoglobin : 8.1 g/dL  Hematocrit : 28.6 %  Platelet Count - Automated : 195 K/uL  Mean Cell Volume : 85.4 fL  Mean Cell Hemoglobin : 24.2 pg  Mean Cell Hemoglobin Concentration : 28.3 g/dL  Auto Neutrophil # : x  Auto Lymphocyte # : x  Auto Monocyte # : x  Auto Eosinophil # : x  Auto Basophil # : x  Auto Neutrophil % : x  Auto Lymphocyte % : x  Auto Monocyte % : x  Auto Eosinophil % : x  Auto Basophil % : x        139  |  99  |  12  ----------------------------<  132[H]  3.8   |  29  |  0.72    Ca    8.3[L]      17 Mar 2025 06:19  Phos  3.6       Mg     1.90           Urinalysis Basic - ( 17 Mar 2025 08:32 )    Color: Dark Yellow / Appearance: Cloudy / S.027 / pH: x  Gluc: x / Ketone: Negative mg/dL  / Bili: Small / Urobili: 1.0 mg/dL   Blood: x / Protein: 100 mg/dL / Nitrite: Negative   Leuk Esterase: Trace / RBC: 2 /HPF / WBC 2 /HPF   Sq Epi: x / Non Sq Epi: 2 /HPF / Bacteria: Negative /HPF        VITALS  T(C): 38 (25 @ 08:00), Max: 39.4 (25 @ 15:15)  HR: 112 (25 @ 08:00) (79 - 140)  BP: 130/76 (25 @ 08:00) (123/66 - 137/59)  RR: 26 (25 @ 08:00) (18 - 29)  SpO2: 100% (25 @ 08:00) (91% - 100%)  Wt(kg): --    ALLERGIES  No Known Allergies      MEDICATIONS   acetaminophen   Oral Liquid .. 650 milliGRAM(s) Oral every 6 hours PRN  albuterol/ipratropium for Nebulization 3 milliLiter(s) Nebulizer every 6 hours  amLODIPine   Tablet 10 milliGRAM(s) Oral daily  apixaban 5 milliGRAM(s) Oral every 12 hours  caspofungin IVPB 50 milliGRAM(s) IV Intermittent every 24 hours  chlorhexidine 0.12% Liquid 15 milliLiter(s) Swish and Spit two times a day  chlorhexidine 2% Cloths 1 Application(s) Topical <User Schedule>  cloNIDine 0.3 milliGRAM(s) Oral every 8 hours  clotrimazole 1% Cream 1 Application(s) Topical two times a day  dextrose 50% Injectable 25 Gram(s) IV Push once  dextrose 50% Injectable 12.5 Gram(s) IV Push once  dextrose 50% Injectable 25 Gram(s) IV Push once  furosemide Solution 40 milliGRAM(s) Oral every 12 hours  gabapentin 200 milliGRAM(s) Oral at bedtime  gabapentin Solution 100 milliGRAM(s) Oral two times a day  glucagon  Injectable 1 milliGRAM(s) IntraMuscular once  insulin lispro (ADMELOG) corrective regimen sliding scale   SubCutaneous every 6 hours  multivitamin/minerals/iron Oral Solution (CENTRUM) 15 milliLiter(s) Oral daily  pantoprazole   Suspension 40 milliGRAM(s) Oral daily  piperacillin/tazobactam IVPB.. 4.5 Gram(s) IV Intermittent every 8 hours  polyethylene glycol 3350 17 Gram(s) Oral daily  QUEtiapine 12.5 milliGRAM(s) Oral at bedtime  senna Syrup 5 milliLiter(s) Oral at bedtime      ----------------------------------------------------------------------------------------  PHYSICAL EXAM  Constitutional - NAD, Comfortable  HEENT - NCAT, EOMI  Neck - Supple, No limited ROM  Chest - CTA bilaterally, No wheeze, No rhonchi, No crackles  Cardiovascular - tachy  Abdomen - BS+, Soft, NTND  Extremities - No C/C/E, No calf tenderness   Neurologic Exam -                    Cognitive - Awake, Alert, AAO to self, place, date, year, situation     Communication - Fluent, No dysarthria     Cranial Nerves - CN 2-12 intact     Motor - No focal deficits                    LEFT    UE - ShAB 5/5, EF 5/5, EE 5/5, WE 5/5,  5/5                    RIGHT UE - ShAB 5/5, EF 5/5, EE 5/5, WE 5/5,  5/5                    LEFT    LE - HF 5/5, KE 5/5, DF 5/5, PF 5/5                    RIGHT LE - HF 5/5, KE 5/5, DF 5/5, PF 5/5        Sensory - Intact to LT     Reflexes - DTR Intact, No primitive reflexive     Coordination - FTN intact     OculoVestibular - No saccades, No nystagmus, VOR         Balance - WNL Static  Psychiatric - Mood stable, Affect WNL  ----------------------------------------------------------------------------------------  ASSESSMENT/PLAN  s/p trach and peg 3/7  Pain -  DVT PPX -   Rehab -      incomplete note, consult in progress Patient is a 39y old  Male who presents with a chief complaint of sob (02 Mar 2025 06:19)      HPI:  40 yo M w/ class III obesity, pAfib (no AC), HTN, BEA (on CPAP), history of b/l papilledema attributed to pseudotumor cerebri s/p LP in  with very high opening pressure, who is transferred for VV ECMO for ARDS and severe hypoxia. Patient initially presented to [place] on  for SOB and b/l LE edema. 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 He is a current every day smoker of Newports and marijuana, but does not drink or do other drugs. Currently lives with his mother. There is a long term partner in his life, but they are not , and they have an on/off relationship currently not very involved with each other as per the patient's mother and aunt.  At [facility] where he was getting an eval last year for shortness of breath, he had a sleep study and was diagnosed with sleep apnea, prescribed a PAP machine, which he has in his room but the mother is not sure how many nights per week he uses it. Recently, the last day or two, his mother and brother have been under the weather with URIs and the patient 2 days ago started to develop a ruynny nose, ough, some wheezing of the chest, as well as worsening shortness of breath and fevers at home. He called his aunt who told him to go get checked out and then he landed at the [place] ED. Patient found to be reportedly hypoxemic to 70% on RA with worsening respiratory status/secretions and somnolence in the ED. Patient was intubated with difficulty (7 attempts) due to very large tongue secretions. Despite optimal vent management, patient remained hypoxemic. Unable to prone due to obesity. Patient cannulated for VV ECMO on  and transferred to [facility] for further management.     [place] labs notable for MRSA PCR -, Fluvid -, urine legionella -, sputum gram stain  with GPC and GPR    CTA chest on  negative for pulmonary embolism, notable for patchy bilateral lower lobe opacities, hepatomegaly, mild ascites, edema/induration of the abdominal pannus.    LE duplex on  negative for DVT    TTE on  showed LV EF 61%, enlarged RV with TAPSE 2.1cm, ePASP at least 49 (Patient had 10/2024 TTE with normal LV and RV with ePASP 14).    Only home med is Lasix. Not on acetazolamide for pseudotumor or on Wegovy (was pending auth) (2025 01:48)    s/p trach and peg on 3/7.  in RCU  febrile    REVIEW OF SYSTEMS  + trach collar  weakness    PAST MEDICAL & SURGICAL HISTORY  HTN (hypertension)    Atrial fibrillation    Papilledema of both eyes    Chronic sinusitis    Morbid obesity    No significant past surgical history        SOCIAL HISTORY  Smoking - cigarette smoker  EtOH - Denied   Drugs - marijuana use    FUNCTIONAL HISTORY  Lives with parents  Independent at baseline, works at [employer]    CURRENT FUNCTIONAL STATUS  3/16 Bed Mobility  Bed Mobility Training Treatment not Performed: patient refused treatment,  Patient deferred due to having fever and not wanting to get out of bed.    Therapeutic Exercise  Therapeutic Exercise Rehab Effort: good  Therapeutic Exercise Symptoms Noted During/After Treatment: none  Therapeutic Exercise Detail: Patient performed supine therapeutic exercises for upper and lower extremity 2 x 10.            RECENT LABS/IMAGING  < from: Xray Chest 1 View- PORTABLE-Urgent (Xray Chest 1 View- PORTABLE-Urgent .) (25 @ 15:14) >    ACC: [id number] EXAM:  XR CHEST PORTABLE URGENT 1V   ORDERED BY: AUGUST SANCHEZ     PROCEDURE DATE:  2025          INTERPRETATION:  CLINICAL INFORMATION: Post tracheostomy    TIME OF EXAMINATION: 2025 at 2:40 PM    EXAM: Portable chest    FINDINGS:    The tracheostomy tube is in position and there is no subcutaneous air,   pneumomediastinum or pneumothorax.  Only partial visualization of the lower lung fields but there is haziness   on the right suggesting layering effusion unchanged.  ECMO catheter in place.        COMPARISON:  at 11:56 AM        IMPRESSION: Tracheostomy tube in position.    --- End of Report ---            PATRICK PACHECO MD; Attending Radiologist  This document has been electronically signed. Mar  7 2025  3:23PM    < end of copied text >    CBC Full  -  ( 17 Mar 2025 06:19 )  WBC Count : 12.23 K/uL  RBC Count : 3.35 M/uL  Hemoglobin : 8.1 g/dL  Hematocrit : 28.6 %  Platelet Count - Automated : 195 K/uL  Mean Cell Volume : 85.4 fL  Mean Cell Hemoglobin : 24.2 pg  Mean Cell Hemoglobin Concentration : 28.3 g/dL  Auto Neutrophil # : x  Auto Lymphocyte # : x  Auto Monocyte # : x  Auto Eosinophil # : x  Auto Basophil # : x  Auto Neutrophil % : x  Auto Lymphocyte % : x  Auto Monocyte % : x  Auto Eosinophil % : x  Auto Basophil % : x        139  |  99  |  12  ----------------------------<  132[H]  3.8   |  29  |  0.72    Ca    8.3[L]      17 Mar 2025 06:19  Phos  3.6       Mg     1.90           Urinalysis Basic - ( 17 Mar 2025 08:32 )    Color: Dark Yellow / Appearance: Cloudy / S.027 / pH: x  Gluc: x / Ketone: Negative mg/dL  / Bili: Small / Urobili: 1.0 mg/dL   Blood: x / Protein: 100 mg/dL / Nitrite: Negative   Leuk Esterase: Trace / RBC: 2 /HPF / WBC 2 /HPF   Sq Epi: x / Non Sq Epi: 2 /HPF / Bacteria: Negative /HPF        VITALS  T(C): 38 (25 @ 08:00), Max: 39.4 (25 @ 15:15)  HR: 112 (25 @ 08:00) (79 - 140)  BP: 130/76 (25 @ 08:00) (123/66 - 137/59)  RR: 26 (25 @ 08:00) (18 - 29)  SpO2: 100% (25 @ 08:00) (91% - 100%)  Wt(kg): --    ALLERGIES  No Known Allergies      MEDICATIONS   acetaminophen   Oral Liquid .. 650 milliGRAM(s) Oral every 6 hours PRN  albuterol/ipratropium for Nebulization 3 milliLiter(s) Nebulizer every 6 hours  amLODIPine   Tablet 10 milliGRAM(s) Oral daily  apixaban 5 milliGRAM(s) Oral every 12 hours  caspofungin IVPB 50 milliGRAM(s) IV Intermittent every 24 hours  chlorhexidine 0.12% Liquid 15 milliLiter(s) Swish and Spit two times a day  chlorhexidine 2% Cloths 1 Application(s) Topical <User Schedule>  cloNIDine 0.3 milliGRAM(s) Oral every 8 hours  clotrimazole 1% Cream 1 Application(s) Topical two times a day  dextrose 50% Injectable 25 Gram(s) IV Push once  dextrose 50% Injectable 12.5 Gram(s) IV Push once  dextrose 50% Injectable 25 Gram(s) IV Push once  furosemide Solution 40 milliGRAM(s) Oral every 12 hours  gabapentin 200 milliGRAM(s) Oral at bedtime  gabapentin Solution 100 milliGRAM(s) Oral two times a day  glucagon  Injectable 1 milliGRAM(s) IntraMuscular once  insulin lispro (ADMELOG) corrective regimen sliding scale   SubCutaneous every 6 hours  multivitamin/minerals/iron Oral Solution (CENTRUM) 15 milliLiter(s) Oral daily  pantoprazole   Suspension 40 milliGRAM(s) Oral daily  piperacillin/tazobactam IVPB.. 4.5 Gram(s) IV Intermittent every 8 hours  polyethylene glycol 3350 17 Gram(s) Oral daily  QUEtiapine 12.5 milliGRAM(s) Oral at bedtime  senna Syrup 5 milliLiter(s) Oral at bedtime      ----------------------------------------------------------------------------------------  PHYSICAL EXAM  Constitutional - NAD, Comfortable with cooling blanket  HEENT - + trach   Chest - no respiratory distress  Cardiovascular - tachy  Abdomen -  Soft, NTND  Extremities - dressings b/l feet  Neurologic Exam -                    Cognitive - Awake, Alert, AAO to self, place, date, year, situation      Communication - mouths words     Cranial Nerves - CN 2-12 intact     Motor -                     LEFT    UE - 4/5                    RIGHT UE - 4/5                    LEFT    LE -  1+/5                     RIGHT LE -  1/5        Sensory - Intact to LT       Balance - WNL Static  Psychiatric - Mood stable, Affect WNL  ----------------------------------------------------------------------------------------  ASSESSMENT/PLAN  40 yo m PMHx of morbid obesity, BEA on CPAP, pAFIB (not on AC), HTN, and BL papilledema attributed to pseudotumor cerebri who presented initially to [facility] on  with SOB and BL LE edema. Found with URI outpatient with progressive SOB, and concern for noted for MFPNA on imaging. Course progressed with AHRF with worsening O2 demand, respiratory distress, secretions, and somnolence requiring intubation (difficult with success after 6 attempts) in ED and admission to [facility] MICU. While in MICU, patient noted with increasing O2 demand with no improvement despite optimal vent management. Concern noted for progressive ARDS, unable to prone given morbid obesity, and ultimately cannulated for vvECMO on  and transferred to [facility] for further management on .   transferred to RCU 3/11, course complicated by fever  s/p trach and peg 3/7  continue bedside PT and OT   neurontin for bilateral lower extremity neuropathy  failed green dye test 3/13  Pain - acetaminphen, gabapentin  npo with tube feeds  DVT PPX - on eliquis  Rehab -  patient deferring out of bed with PT, will monitor for improved tolerance, improvement with bedside therapy. Currently would recommend subacute rehab when medically cleared, however if able to tolerate bedside therapy will reassess for acute rehab.   Currently limited by fevers, being treated for positive yeast in blood cultures.   on caspofungin, also on zosyn    55 minutes spent reviewing hospital course, therapy notes, relevant imaging,  performing exam, rehab education, and documentation

## 2025-03-17 NOTE — PROGRESS NOTE ADULT - SUBJECTIVE AND OBJECTIVE BOX
RCU PROGRESS NOTE     CHIEF COMPLAINT: Patient is a 39y old  Male who presents with a chief complaint of sob (02 Mar 2025 06:19)      INTERVAL EVENTS:  - Persistent fevers overnight  - Remained off vent overnight    REVIEW OF SYSTEMS: Seen by bedside during AM rounds and denies pain or SOB    Mode: CPAP with PS, FiO2: 40, PEEP: 6, PS: 12      OBJECTIVE:  ICU Vital Signs Last 24 Hrs  T(C): 38.4 (16 Mar 2025 08:15), Max: 39.1 (16 Mar 2025 00:00)  T(F): 101.2 (16 Mar 2025 08:15), Max: 102.3 (16 Mar 2025 00:00)  HR: 126 (16 Mar 2025 06:56) (121 - 139)  BP: 125/54 (16 Mar 2025 04:00) (112/60 - 145/72)  BP(mean): 73 (16 Mar 2025 04:00) (73 - 93)  ABP: --  ABP(mean): --  RR: 22 (16 Mar 2025 04:00) (20 - 28)  SpO2: 97% (16 Mar 2025 06:56) (97% - 100%)    O2 Parameters below as of 16 Mar 2025 06:56  Patient On (Oxygen Delivery Method): ventilator          Mode: CPAP with PS, FiO2: 40, PEEP: 6, PS: 12    03-15 @ 07:01  -  03-16 @ 07:00  --------------------------------------------------------  IN: 1450 mL / OUT: 1700 mL / NET: -250 mL      CAPILLARY BLOOD GLUCOSE  POCT Blood Glucose.: 110 mg/dL (16 Mar 2025 05:36)      HOSPITAL MEDICATIONS:  MEDICATIONS  (STANDING):  albuterol/ipratropium for Nebulization 3 milliLiter(s) Nebulizer every 6 hours  amLODIPine   Tablet 10 milliGRAM(s) Oral daily  argatroban Infusion 2 MICROgram(s)/kG/Min (24.1 mL/Hr) IV Continuous <Continuous>  chlorhexidine 0.12% Liquid 15 milliLiter(s) Swish and Spit two times a day  chlorhexidine 2% Cloths 1 Application(s) Topical <User Schedule>  cloNIDine 0.2 milliGRAM(s) Oral every 8 hours  clotrimazole 1% Cream 1 Application(s) Topical two times a day  dextrose 50% Injectable 25 Gram(s) IV Push once  dextrose 50% Injectable 12.5 Gram(s) IV Push once  dextrose 50% Injectable 25 Gram(s) IV Push once  furosemide Solution 40 milliGRAM(s) Oral every 12 hours  gabapentin Solution 100 milliGRAM(s) Oral three times a day  glucagon  Injectable 1 milliGRAM(s) IntraMuscular once  insulin lispro (ADMELOG) corrective regimen sliding scale   SubCutaneous every 6 hours  lisinopril 5 milliGRAM(s) Oral every 24 hours  multivitamin/minerals/iron Oral Solution (CENTRUM) 15 milliLiter(s) Oral daily  nystatin    Suspension 727494 Unit(s) Oral every 6 hours  pantoprazole   Suspension 40 milliGRAM(s) Oral daily  piperacillin/tazobactam IVPB.. 4.5 Gram(s) IV Intermittent every 8 hours  polyethylene glycol 3350 17 Gram(s) Oral daily  QUEtiapine 12.5 milliGRAM(s) Oral at bedtime  senna Syrup 5 milliLiter(s) Oral at bedtime  sodium chloride 3%  Inhalation 4 milliLiter(s) Inhalation every 12 hours    MEDICATIONS  (PRN):  acetaminophen   Oral Liquid .. 650 milliGRAM(s) Oral every 6 hours PRN Temp greater or equal to 38C (100.4F), Mild Pain (1 - 3), Moderate Pain (4 - 6)      PHYSICAL EXAMINATION  General: NAD and morbidly obese   HEENT: Trach present and able to tolerate PMV well with strong cough and able to cough up clear secretions into mouth   Cards: S1/S2, no murmurs   Pulm: CTA bilaterally. No wheezes.   Abdomen: Soft, nondistended and nontender. PEG (+)   Extremities: No pedal edema. ISI of BL upper and lower extremities with some weakness.  Neurology: Awake and alert with no acute focal neurological deficits     LABS:                          PT/INR - ( 15 Mar 2025 05:04 )   PT: 22.2 sec;   INR: 1.88 ratio         PTT - ( 15 Mar 2025 05:04 )  PTT:50.9 sec    Urinalysis Basic - ( 15 Mar 2025 05:04 )  Color: x / Appearance: x / SG: x / pH: x  Gluc: 117 mg/dL / Ketone: x  / Bili: x / Urobili: x   Blood: x / Protein: x / Nitrite: x   Leuk Esterase: x / RBC: x / WBC x   Sq Epi: x / Non Sq Epi: x / Bacteria: x        Venous Blood Gas:  03-15 @ 18:30  7.42/52/59/34/92.0  VBG Lactate: --  Venous Blood Gas:  03-15 @ 05:04  7.30/71/76/35/96.7  VBG Lactate: 1.7      PAST MEDICAL & SURGICAL HISTORY:  HTN (hypertension)      Atrial fibrillation  paroxysmal      Papilledema of both eyes      Chronic sinusitis      Morbid obesity      No significant past surgical history          FAMILY HISTORY:      Social History:  sexually active with female partner occasionally (26 Feb 2025 01:48)      RADIOLOGY:  [ ] Reviewed and interpreted by me    PULMONARY FUNCTION TESTS:    EKG: RCU PROGRESS NOTE     CHIEF COMPLAINT: Patient is a 39y old  Male who presents with a chief complaint of sob (02 Mar 2025 06:19)      INTERVAL EVENTS:  - Persistent fevers overnight and found with yeast in blood culture.   - Caspo added empirically and pending RPT BCx, panCT, TTE and ID   - Remained off vent overnight however hypercarbic and slightly hypoxic this morning.   - Placed back on vent with improvement and then weaned back to TC QD.    REVIEW OF SYSTEMS: Seen by bedside during AM rounds and denies pain or SOB    Mode: CPAP with PS, FiO2: 40, PEEP: 6, PS: 12      OBJECTIVE:  ICU Vital Signs Last 24 Hrs  T(C): 38.4 (16 Mar 2025 08:15), Max: 39.1 (16 Mar 2025 00:00)  T(F): 101.2 (16 Mar 2025 08:15), Max: 102.3 (16 Mar 2025 00:00)  HR: 126 (16 Mar 2025 06:56) (121 - 139)  BP: 125/54 (16 Mar 2025 04:00) (112/60 - 145/72)  BP(mean): 73 (16 Mar 2025 04:00) (73 - 93)  ABP: --  ABP(mean): --  RR: 22 (16 Mar 2025 04:00) (20 - 28)  SpO2: 97% (16 Mar 2025 06:56) (97% - 100%)    O2 Parameters below as of 16 Mar 2025 06:56  Patient On (Oxygen Delivery Method): ventilator          Mode: CPAP with PS, FiO2: 40, PEEP: 6, PS: 12    03-15 @ 07:01  -  03-16 @ 07:00  --------------------------------------------------------  IN: 1450 mL / OUT: 1700 mL / NET: -250 mL      CAPILLARY BLOOD GLUCOSE  POCT Blood Glucose.: 110 mg/dL (16 Mar 2025 05:36)      HOSPITAL MEDICATIONS:  MEDICATIONS  (STANDING):  albuterol/ipratropium for Nebulization 3 milliLiter(s) Nebulizer every 6 hours  amLODIPine   Tablet 10 milliGRAM(s) Oral daily  argatroban Infusion 2 MICROgram(s)/kG/Min (24.1 mL/Hr) IV Continuous <Continuous>  chlorhexidine 0.12% Liquid 15 milliLiter(s) Swish and Spit two times a day  chlorhexidine 2% Cloths 1 Application(s) Topical <User Schedule>  cloNIDine 0.2 milliGRAM(s) Oral every 8 hours  clotrimazole 1% Cream 1 Application(s) Topical two times a day  dextrose 50% Injectable 25 Gram(s) IV Push once  dextrose 50% Injectable 12.5 Gram(s) IV Push once  dextrose 50% Injectable 25 Gram(s) IV Push once  furosemide Solution 40 milliGRAM(s) Oral every 12 hours  gabapentin Solution 100 milliGRAM(s) Oral three times a day  glucagon  Injectable 1 milliGRAM(s) IntraMuscular once  insulin lispro (ADMELOG) corrective regimen sliding scale   SubCutaneous every 6 hours  lisinopril 5 milliGRAM(s) Oral every 24 hours  multivitamin/minerals/iron Oral Solution (CENTRUM) 15 milliLiter(s) Oral daily  nystatin    Suspension 874034 Unit(s) Oral every 6 hours  pantoprazole   Suspension 40 milliGRAM(s) Oral daily  piperacillin/tazobactam IVPB.. 4.5 Gram(s) IV Intermittent every 8 hours  polyethylene glycol 3350 17 Gram(s) Oral daily  QUEtiapine 12.5 milliGRAM(s) Oral at bedtime  senna Syrup 5 milliLiter(s) Oral at bedtime  sodium chloride 3%  Inhalation 4 milliLiter(s) Inhalation every 12 hours    MEDICATIONS  (PRN):  acetaminophen   Oral Liquid .. 650 milliGRAM(s) Oral every 6 hours PRN Temp greater or equal to 38C (100.4F), Mild Pain (1 - 3), Moderate Pain (4 - 6)      PHYSICAL EXAMINATION  General: NAD and morbidly obese   HEENT: Trach present and able to tolerate PMV well with strong cough and able to cough up clear secretions into mouth   Cards: S1/S2, no murmurs   Pulm: CTA bilaterally. No wheezes.   Abdomen: Soft, nondistended and nontender. PEG (+)   Extremities: No pedal edema. ISI of BL upper and lower extremities with some weakness.  Neurology: Awake and alert with no acute focal neurological deficits     LABS:                                      8.1    12.23 )-----------( 195      ( 17 Mar 2025 06:19 )             28.6       03-17    139  |  99  |  12  ----------------------------<  132[H]  3.8   |  29  |  0.72    Ca    8.3[L]      17 Mar 2025 06:19  Phos  3.6     03-17  Mg     1.90     03-17      Urinalysis Basic - ( 15 Mar 2025 05:04 )  Color: x / Appearance: x / SG: x / pH: x  Gluc: 117 mg/dL / Ketone: x  / Bili: x / Urobili: x   Blood: x / Protein: x / Nitrite: x   Leuk Esterase: x / RBC: x / WBC x   Sq Epi: x / Non Sq Epi: x / Bacteria: x        Venous Blood Gas:  03-15 @ 18:30  7.42/52/59/34/92.0  VBG Lactate: --  Venous Blood Gas:  03-15 @ 05:04  7.30/71/76/35/96.7  VBG Lactate: 1.7      PAST MEDICAL & SURGICAL HISTORY:  HTN (hypertension)      Atrial fibrillation  paroxysmal      Papilledema of both eyes      Chronic sinusitis      Morbid obesity      No significant past surgical history          FAMILY HISTORY:      Social History:  sexually active with female partner occasionally (26 Feb 2025 01:48)      RADIOLOGY:  [ ] Reviewed and interpreted by me    PULMONARY FUNCTION TESTS:    EKG:

## 2025-03-17 NOTE — PROGRESS NOTE ADULT - NS ATTEND AMEND GEN_ALL_CORE FT
38 yo M w/ class III obesity, pAfib (no AC), HTN, BEA on CPAP, history of b/l papilledema attributed to pseudotumor cerebri who was transferred from [facility] on 2/26 for VV ECMO 2/2 ARDS. TTE/ROBERT with RV failure and was diuresed. s/p treatment of pneumonia. Now s/p trach and peg. ECMO catheter decannulated 3/7. Course complicated by sepsis with fungemia of unclear source.      Plan:     - ARDS s/p VV ecmo. Currently trach dependent, C/W nebs/airway clearance. wean to TC and use PMV for communication.   - OOB, ambulate as tolerated, PT  - C/W oral diuresis.   - Repeat TTE later this admission to evaluate for vegetation   - Failed S/S eval. Will consider repeat as he becomes stronger.   - c/w TC during the day and vent at night   - C/W tube feeds via peg  - Patient found to be fungemic with yeast like cells in 2/4 bottles from blood cx from 3/15  - c/w Caspo and zosyn   - ID consult    - DVT noted on repeat duplex. C/W full ac on Eliquis   - Hx of afib, [id number] of 2. c/w full AC  - Dispo- full code. PT. OOB to chair.

## 2025-03-18 LAB
ANION GAP SERPL CALC-SCNC: 11 MMOL/L — SIGNIFICANT CHANGE UP (ref 7–14)
BASE EXCESS BLDV CALC-SCNC: 10 MMOL/L — HIGH (ref -2–3)
BASOPHILS # BLD AUTO: 0.01 K/UL — SIGNIFICANT CHANGE UP (ref 0–0.2)
BASOPHILS NFR BLD AUTO: 0.1 % — SIGNIFICANT CHANGE UP (ref 0–2)
BUN SERPL-MCNC: 11 MG/DL — SIGNIFICANT CHANGE UP (ref 7–23)
CALCIUM SERPL-MCNC: 8.4 MG/DL — SIGNIFICANT CHANGE UP (ref 8.4–10.5)
CHLORIDE SERPL-SCNC: 96 MMOL/L — LOW (ref 98–107)
CO2 BLDV-SCNC: 41 MMOL/L — HIGH (ref 22–26)
CO2 SERPL-SCNC: 30 MMOL/L — SIGNIFICANT CHANGE UP (ref 22–31)
CREAT SERPL-MCNC: 0.71 MG/DL — SIGNIFICANT CHANGE UP (ref 0.5–1.3)
CULTURE RESULTS: ABNORMAL
EGFR: 121 ML/MIN/1.73M2 — SIGNIFICANT CHANGE UP
EGFR: 121 ML/MIN/1.73M2 — SIGNIFICANT CHANGE UP
EOSINOPHIL # BLD AUTO: 0.21 K/UL — SIGNIFICANT CHANGE UP (ref 0–0.5)
EOSINOPHIL NFR BLD AUTO: 2 % — SIGNIFICANT CHANGE UP (ref 0–6)
GLUCOSE BLDC GLUCOMTR-MCNC: 123 MG/DL — HIGH (ref 70–99)
GLUCOSE BLDC GLUCOMTR-MCNC: 125 MG/DL — HIGH (ref 70–99)
GLUCOSE BLDC GLUCOMTR-MCNC: 127 MG/DL — HIGH (ref 70–99)
GLUCOSE BLDC GLUCOMTR-MCNC: 154 MG/DL — HIGH (ref 70–99)
GLUCOSE SERPL-MCNC: 126 MG/DL — HIGH (ref 70–99)
GRAM STN FLD: ABNORMAL
GRAM STN FLD: ABNORMAL
HCO3 BLDV-SCNC: 39 MMOL/L — HIGH (ref 22–29)
HCT VFR BLD CALC: 30.5 % — LOW (ref 39–50)
HGB BLD-MCNC: 8.5 G/DL — LOW (ref 13–17)
IANC: 8.12 K/UL — HIGH (ref 1.8–7.4)
IMM GRANULOCYTES NFR BLD AUTO: 0.5 % — SIGNIFICANT CHANGE UP (ref 0–0.9)
LYMPHOCYTES # BLD AUTO: 1.3 K/UL — SIGNIFICANT CHANGE UP (ref 1–3.3)
LYMPHOCYTES # BLD AUTO: 12.3 % — LOW (ref 13–44)
MAGNESIUM SERPL-MCNC: 1.9 MG/DL — SIGNIFICANT CHANGE UP (ref 1.6–2.6)
MCHC RBC-ENTMCNC: 24 PG — LOW (ref 27–34)
MCHC RBC-ENTMCNC: 27.9 G/DL — LOW (ref 32–36)
MCV RBC AUTO: 86.2 FL — SIGNIFICANT CHANGE UP (ref 80–100)
MONOCYTES # BLD AUTO: 0.85 K/UL — SIGNIFICANT CHANGE UP (ref 0–0.9)
MONOCYTES NFR BLD AUTO: 8.1 % — SIGNIFICANT CHANGE UP (ref 2–14)
NEUTROPHILS # BLD AUTO: 8.12 K/UL — HIGH (ref 1.8–7.4)
NEUTROPHILS NFR BLD AUTO: 77 % — SIGNIFICANT CHANGE UP (ref 43–77)
NRBC # BLD AUTO: 0 K/UL — SIGNIFICANT CHANGE UP (ref 0–0)
NRBC # FLD: 0 K/UL — SIGNIFICANT CHANGE UP (ref 0–0)
NRBC BLD AUTO-RTO: 0 /100 WBCS — SIGNIFICANT CHANGE UP (ref 0–0)
PCO2 BLDV: 74 MMHG — CRITICAL HIGH (ref 42–55)
PH BLDV: 7.33 — SIGNIFICANT CHANGE UP (ref 7.32–7.43)
PHOSPHATE SERPL-MCNC: 3.4 MG/DL — SIGNIFICANT CHANGE UP (ref 2.5–4.5)
PLATELET # BLD AUTO: 164 K/UL — SIGNIFICANT CHANGE UP (ref 150–400)
PO2 BLDV: 32 MMHG — SIGNIFICANT CHANGE UP (ref 25–45)
POTASSIUM SERPL-MCNC: 4.1 MMOL/L — SIGNIFICANT CHANGE UP (ref 3.5–5.3)
POTASSIUM SERPL-SCNC: 4.1 MMOL/L — SIGNIFICANT CHANGE UP (ref 3.5–5.3)
RBC # BLD: 3.54 M/UL — LOW (ref 4.2–5.8)
RBC # FLD: 23.8 % — HIGH (ref 10.3–14.5)
SAO2 % BLDV: 47.6 % — LOW (ref 67–88)
SODIUM SERPL-SCNC: 137 MMOL/L — SIGNIFICANT CHANGE UP (ref 135–145)
SPECIMEN SOURCE: SIGNIFICANT CHANGE UP
WBC # BLD: 10.54 K/UL — HIGH (ref 3.8–10.5)
WBC # FLD AUTO: 10.54 K/UL — HIGH (ref 3.8–10.5)

## 2025-03-18 PROCEDURE — G0545: CPT

## 2025-03-18 PROCEDURE — 99233 SBSQ HOSP IP/OBS HIGH 50: CPT

## 2025-03-18 PROCEDURE — 99232 SBSQ HOSP IP/OBS MODERATE 35: CPT

## 2025-03-18 RX ORDER — HYDROMORPHONE/SOD CHLOR,ISO/PF 2 MG/10 ML
1 SYRINGE (ML) INJECTION ONCE
Refills: 0 | Status: DISCONTINUED | OUTPATIENT
Start: 2025-03-18 | End: 2025-03-18

## 2025-03-18 RX ORDER — MELATONIN 5 MG
3 TABLET ORAL AT BEDTIME
Refills: 0 | Status: DISCONTINUED | OUTPATIENT
Start: 2025-03-18 | End: 2025-04-25

## 2025-03-18 RX ORDER — QUETIAPINE FUMARATE 25 MG/1
25 TABLET ORAL AT BEDTIME
Refills: 0 | Status: DISCONTINUED | OUTPATIENT
Start: 2025-03-18 | End: 2025-04-25

## 2025-03-18 RX ORDER — ACETAMINOPHEN 500 MG/5ML
1000 LIQUID (ML) ORAL ONCE
Refills: 0 | Status: COMPLETED | OUTPATIENT
Start: 2025-03-18 | End: 2025-03-18

## 2025-03-18 RX ORDER — MELATONIN 5 MG
3 TABLET ORAL AT BEDTIME
Refills: 0 | Status: DISCONTINUED | OUTPATIENT
Start: 2025-03-18 | End: 2025-03-18

## 2025-03-18 RX ADMIN — Medication 3 MILLIGRAM(S): at 21:09

## 2025-03-18 RX ADMIN — Medication 5 MILLILITER(S): at 21:16

## 2025-03-18 RX ADMIN — Medication 15 MILLILITER(S): at 05:13

## 2025-03-18 RX ADMIN — Medication 1000 MILLIGRAM(S): at 21:37

## 2025-03-18 RX ADMIN — GABAPENTIN 100 MILLIGRAM(S): 400 CAPSULE ORAL at 05:15

## 2025-03-18 RX ADMIN — Medication 1 APPLICATION(S): at 05:16

## 2025-03-18 RX ADMIN — Medication 1 MILLIGRAM(S): at 21:51

## 2025-03-18 RX ADMIN — FUROSEMIDE 40 MILLIGRAM(S): 10 INJECTION INTRAMUSCULAR; INTRAVENOUS at 17:09

## 2025-03-18 RX ADMIN — QUETIAPINE FUMARATE 25 MILLIGRAM(S): 25 TABLET ORAL at 21:09

## 2025-03-18 RX ADMIN — Medication 15 MILLILITER(S): at 17:07

## 2025-03-18 RX ADMIN — GABAPENTIN 200 MILLIGRAM(S): 400 CAPSULE ORAL at 21:09

## 2025-03-18 RX ADMIN — Medication 0.3 MILLIGRAM(S): at 05:13

## 2025-03-18 RX ADMIN — IPRATROPIUM BROMIDE AND ALBUTEROL SULFATE 3 MILLILITER(S): .5; 2.5 SOLUTION RESPIRATORY (INHALATION) at 21:55

## 2025-03-18 RX ADMIN — Medication 650 MILLIGRAM(S): at 11:40

## 2025-03-18 RX ADMIN — Medication 0.3 MILLIGRAM(S): at 14:44

## 2025-03-18 RX ADMIN — Medication 25 GRAM(S): at 14:44

## 2025-03-18 RX ADMIN — FUROSEMIDE 40 MILLIGRAM(S): 10 INJECTION INTRAMUSCULAR; INTRAVENOUS at 05:13

## 2025-03-18 RX ADMIN — GABAPENTIN 100 MILLIGRAM(S): 400 CAPSULE ORAL at 17:07

## 2025-03-18 RX ADMIN — Medication 40 MILLIGRAM(S): at 11:41

## 2025-03-18 RX ADMIN — Medication 25 GRAM(S): at 05:16

## 2025-03-18 RX ADMIN — APIXABAN 5 MILLIGRAM(S): 2.5 TABLET, FILM COATED ORAL at 17:07

## 2025-03-18 RX ADMIN — CASPOFUNGIN ACETATE 260 MILLIGRAM(S): 5 INJECTION, POWDER, LYOPHILIZED, FOR SOLUTION INTRAVENOUS at 04:00

## 2025-03-18 RX ADMIN — Medication 15 MILLILITER(S): at 11:40

## 2025-03-18 RX ADMIN — IPRATROPIUM BROMIDE AND ALBUTEROL SULFATE 3 MILLILITER(S): .5; 2.5 SOLUTION RESPIRATORY (INHALATION) at 06:35

## 2025-03-18 RX ADMIN — Medication 1 MILLIGRAM(S): at 21:21

## 2025-03-18 RX ADMIN — CLOTRIMAZOLE 1 APPLICATION(S): 1 CREAM TOPICAL at 05:12

## 2025-03-18 RX ADMIN — Medication 25 GRAM(S): at 21:08

## 2025-03-18 RX ADMIN — Medication 400 MILLIGRAM(S): at 21:07

## 2025-03-18 RX ADMIN — CLOTRIMAZOLE 1 APPLICATION(S): 1 CREAM TOPICAL at 17:08

## 2025-03-18 RX ADMIN — APIXABAN 5 MILLIGRAM(S): 2.5 TABLET, FILM COATED ORAL at 05:14

## 2025-03-18 RX ADMIN — Medication 0.3 MILLIGRAM(S): at 21:09

## 2025-03-18 RX ADMIN — AMLODIPINE BESYLATE 10 MILLIGRAM(S): 10 TABLET ORAL at 05:14

## 2025-03-18 RX ADMIN — Medication 650 MILLIGRAM(S): at 12:30

## 2025-03-18 RX ADMIN — IPRATROPIUM BROMIDE AND ALBUTEROL SULFATE 3 MILLILITER(S): .5; 2.5 SOLUTION RESPIRATORY (INHALATION) at 03:27

## 2025-03-18 RX ADMIN — IPRATROPIUM BROMIDE AND ALBUTEROL SULFATE 3 MILLILITER(S): .5; 2.5 SOLUTION RESPIRATORY (INHALATION) at 13:30

## 2025-03-18 NOTE — PROGRESS NOTE ADULT - ASSESSMENT
39M presents with bilateral foot plantar bullae  - Patient seen and evaluated  - Afebrile, No Leukocytosis   - L foot plantar lateral forefoot serous bullae with serosanginous drainage, ischemic changes to fifth digit, no malodor, no pus, no erythema, no acute signs of infection. R foot plantar lateral forefoot serous bullae with serous drainage, ischemic changes to fifth digit, no malodor, no pus, no erythema, no acute signs of infection  - lanced b/l foot serous blister with sterile 15 blade, mild serous drainage. Patient tolerated well.   - No culture secondary to no acute signs of infection   - No acute pod intervention, local wound care only   - Wound care instruction and follow up information in discharge note provider   - Discussed with attending

## 2025-03-18 NOTE — PROGRESS NOTE ADULT - SUBJECTIVE AND OBJECTIVE BOX
RCU PROGRESS NOTE     CHIEF COMPLAINT: Patient is a 39y old  Male who presents with a chief complaint of sob (02 Mar 2025 06:19)      INTERVAL EVENTS:  - Persistent fevers overnight   - BCx 3/15 with candida albicans   - RPT BCx 3/16 NGTD   - Pending RPT BCx 3/18   - Caspo added empirically   - Remains compensated hypercarbic in morning and bilevel overnight increased to 18/10 given OHS concerns.   - Tolerates TC during the day well with PMV     REVIEW OF SYSTEMS: Seen by bedside during AM rounds and denies pain or SOB    Mode: CPAP with PS, FiO2: 40, PEEP: 6, PS: 12      OBJECTIVE:  ICU Vital Signs Last 24 Hrs  T(C): 38.4 (16 Mar 2025 08:15), Max: 39.1 (16 Mar 2025 00:00)  T(F): 101.2 (16 Mar 2025 08:15), Max: 102.3 (16 Mar 2025 00:00)  HR: 126 (16 Mar 2025 06:56) (121 - 139)  BP: 125/54 (16 Mar 2025 04:00) (112/60 - 145/72)  BP(mean): 73 (16 Mar 2025 04:00) (73 - 93)  ABP: --  ABP(mean): --  RR: 22 (16 Mar 2025 04:00) (20 - 28)  SpO2: 97% (16 Mar 2025 06:56) (97% - 100%)    O2 Parameters below as of 16 Mar 2025 06:56  Patient On (Oxygen Delivery Method): ventilator          Mode: CPAP with PS, FiO2: 40, PEEP: 6, PS: 12    03-15 @ 07:01  -  03-16 @ 07:00  --------------------------------------------------------  IN: 1450 mL / OUT: 1700 mL / NET: -250 mL      CAPILLARY BLOOD GLUCOSE  POCT Blood Glucose.: 110 mg/dL (16 Mar 2025 05:36)      HOSPITAL MEDICATIONS:  MEDICATIONS  (STANDING):  albuterol/ipratropium for Nebulization 3 milliLiter(s) Nebulizer every 6 hours  amLODIPine   Tablet 10 milliGRAM(s) Oral daily  argatroban Infusion 2 MICROgram(s)/kG/Min (24.1 mL/Hr) IV Continuous <Continuous>  chlorhexidine 0.12% Liquid 15 milliLiter(s) Swish and Spit two times a day  chlorhexidine 2% Cloths 1 Application(s) Topical <User Schedule>  cloNIDine 0.2 milliGRAM(s) Oral every 8 hours  clotrimazole 1% Cream 1 Application(s) Topical two times a day  dextrose 50% Injectable 25 Gram(s) IV Push once  dextrose 50% Injectable 12.5 Gram(s) IV Push once  dextrose 50% Injectable 25 Gram(s) IV Push once  furosemide Solution 40 milliGRAM(s) Oral every 12 hours  gabapentin Solution 100 milliGRAM(s) Oral three times a day  glucagon  Injectable 1 milliGRAM(s) IntraMuscular once  insulin lispro (ADMELOG) corrective regimen sliding scale   SubCutaneous every 6 hours  lisinopril 5 milliGRAM(s) Oral every 24 hours  multivitamin/minerals/iron Oral Solution (CENTRUM) 15 milliLiter(s) Oral daily  nystatin    Suspension 038900 Unit(s) Oral every 6 hours  pantoprazole   Suspension 40 milliGRAM(s) Oral daily  piperacillin/tazobactam IVPB.. 4.5 Gram(s) IV Intermittent every 8 hours  polyethylene glycol 3350 17 Gram(s) Oral daily  QUEtiapine 12.5 milliGRAM(s) Oral at bedtime  senna Syrup 5 milliLiter(s) Oral at bedtime  sodium chloride 3%  Inhalation 4 milliLiter(s) Inhalation every 12 hours    MEDICATIONS  (PRN):  acetaminophen   Oral Liquid .. 650 milliGRAM(s) Oral every 6 hours PRN Temp greater or equal to 38C (100.4F), Mild Pain (1 - 3), Moderate Pain (4 - 6)      PHYSICAL EXAMINATION  General: NAD and morbidly obese   HEENT: Trach present and able to tolerate PMV well with strong cough and able to cough up clear secretions into mouth   Cards: S1/S2, no murmurs   Pulm: CTA bilaterally. No wheezes.   Abdomen: Soft, nondistended and nontender. PEG (+)   Extremities: No pedal edema. ISI of BL upper and lower extremities with some weakness.  Neurology: Awake and alert with no acute focal neurological deficits     LABS:                                                           8.5    10.54 )-----------( 164      ( 18 Mar 2025 06:30 )             30.5         Urinalysis Basic - ( 15 Mar 2025 05:04 )  Color: x / Appearance: x / SG: x / pH: x  Gluc: 117 mg/dL / Ketone: x  / Bili: x / Urobili: x   Blood: x / Protein: x / Nitrite: x   Leuk Esterase: x / RBC: x / WBC x   Sq Epi: x / Non Sq Epi: x / Bacteria: x        Venous Blood Gas:  03-15 @ 18:30  7.42/52/59/34/92.0  VBG Lactate: --  Venous Blood Gas:  03-15 @ 05:04  7.30/71/76/35/96.7  VBG Lactate: 1.7      PAST MEDICAL & SURGICAL HISTORY:  HTN (hypertension)      Atrial fibrillation  paroxysmal      Papilledema of both eyes      Chronic sinusitis      Morbid obesity      No significant past surgical history          FAMILY HISTORY:      Social History:  sexually active with female partner occasionally (26 Feb 2025 01:48)      RADIOLOGY:  [ ] Reviewed and interpreted by me    PULMONARY FUNCTION TESTS:    EKG: RCU PROGRESS NOTE     CHIEF COMPLAINT: Patient is a 39y old  Male who presents with a chief complaint of sob (02 Mar 2025 06:19)      INTERVAL EVENTS:  - Persistent fevers overnight   - BCx 3/15 with candida albicans   - RPT BCx 3/16 with yeast  - Pending RPT BCx 3/18   - Caspo added empirically   - Remains compensated hypercarbic in morning and bilevel overnight increased to 18/10 given OHS concerns.   - Tolerates TC during the day well with PMV   - Insomnia and seroquel increased  - BL feet blister lanced again    REVIEW OF SYSTEMS: Seen by bedside during AM rounds and denies pain or SOB    Mode: CPAP with PS, FiO2: 40, PEEP: 6, PS: 12      OBJECTIVE:  ICU Vital Signs Last 24 Hrs  T(C): 38.4 (16 Mar 2025 08:15), Max: 39.1 (16 Mar 2025 00:00)  T(F): 101.2 (16 Mar 2025 08:15), Max: 102.3 (16 Mar 2025 00:00)  HR: 126 (16 Mar 2025 06:56) (121 - 139)  BP: 125/54 (16 Mar 2025 04:00) (112/60 - 145/72)  BP(mean): 73 (16 Mar 2025 04:00) (73 - 93)  ABP: --  ABP(mean): --  RR: 22 (16 Mar 2025 04:00) (20 - 28)  SpO2: 97% (16 Mar 2025 06:56) (97% - 100%)    O2 Parameters below as of 16 Mar 2025 06:56  Patient On (Oxygen Delivery Method): ventilator          Mode: CPAP with PS, FiO2: 40, PEEP: 6, PS: 12    03-15 @ 07:01  -  03-16 @ 07:00  --------------------------------------------------------  IN: 1450 mL / OUT: 1700 mL / NET: -250 mL      CAPILLARY BLOOD GLUCOSE  POCT Blood Glucose.: 110 mg/dL (16 Mar 2025 05:36)      HOSPITAL MEDICATIONS:  MEDICATIONS  (STANDING):  albuterol/ipratropium for Nebulization 3 milliLiter(s) Nebulizer every 6 hours  amLODIPine   Tablet 10 milliGRAM(s) Oral daily  argatroban Infusion 2 MICROgram(s)/kG/Min (24.1 mL/Hr) IV Continuous <Continuous>  chlorhexidine 0.12% Liquid 15 milliLiter(s) Swish and Spit two times a day  chlorhexidine 2% Cloths 1 Application(s) Topical <User Schedule>  cloNIDine 0.2 milliGRAM(s) Oral every 8 hours  clotrimazole 1% Cream 1 Application(s) Topical two times a day  dextrose 50% Injectable 25 Gram(s) IV Push once  dextrose 50% Injectable 12.5 Gram(s) IV Push once  dextrose 50% Injectable 25 Gram(s) IV Push once  furosemide Solution 40 milliGRAM(s) Oral every 12 hours  gabapentin Solution 100 milliGRAM(s) Oral three times a day  glucagon  Injectable 1 milliGRAM(s) IntraMuscular once  insulin lispro (ADMELOG) corrective regimen sliding scale   SubCutaneous every 6 hours  lisinopril 5 milliGRAM(s) Oral every 24 hours  multivitamin/minerals/iron Oral Solution (CENTRUM) 15 milliLiter(s) Oral daily  nystatin    Suspension 239681 Unit(s) Oral every 6 hours  pantoprazole   Suspension 40 milliGRAM(s) Oral daily  piperacillin/tazobactam IVPB.. 4.5 Gram(s) IV Intermittent every 8 hours  polyethylene glycol 3350 17 Gram(s) Oral daily  QUEtiapine 12.5 milliGRAM(s) Oral at bedtime  senna Syrup 5 milliLiter(s) Oral at bedtime  sodium chloride 3%  Inhalation 4 milliLiter(s) Inhalation every 12 hours    MEDICATIONS  (PRN):  acetaminophen   Oral Liquid .. 650 milliGRAM(s) Oral every 6 hours PRN Temp greater or equal to 38C (100.4F), Mild Pain (1 - 3), Moderate Pain (4 - 6)      PHYSICAL EXAMINATION  General: NAD and morbidly obese   HEENT: Trach present and able to tolerate PMV well with strong cough and able to cough up clear secretions into mouth   Cards: S1/S2, no murmurs   Pulm: CTA bilaterally. No wheezes.   Abdomen: Soft, nondistended and nontender. PEG (+)   Extremities: No pedal edema. ISI of BL upper and lower extremities with some weakness.  Neurology: Awake and alert with no acute focal neurological deficits     LABS:                                                           8.5    10.54 )-----------( 164      ( 18 Mar 2025 06:30 )             30.5     03-18    137  |  96[L]  |  11  ----------------------------<  126[H]  4.1   |  30  |  0.71    Ca    8.4      18 Mar 2025 06:30  Phos  3.4     03-18  Mg     1.90     03-18        Urinalysis Basic - ( 15 Mar 2025 05:04 )  Color: x / Appearance: x / SG: x / pH: x  Gluc: 117 mg/dL / Ketone: x  / Bili: x / Urobili: x   Blood: x / Protein: x / Nitrite: x   Leuk Esterase: x / RBC: x / WBC x   Sq Epi: x / Non Sq Epi: x / Bacteria: x        Venous Blood Gas:  03-15 @ 18:30  7.42/52/59/34/92.0  VBG Lactate: --  Venous Blood Gas:  03-15 @ 05:04  7.30/71/76/35/96.7  VBG Lactate: 1.7      PAST MEDICAL & SURGICAL HISTORY:  HTN (hypertension)      Atrial fibrillation  paroxysmal      Papilledema of both eyes      Chronic sinusitis      Morbid obesity      No significant past surgical history          FAMILY HISTORY:      Social History:  sexually active with female partner occasionally (26 Feb 2025 01:48)      RADIOLOGY:  [ ] Reviewed and interpreted by me    PULMONARY FUNCTION TESTS:    EKG:

## 2025-03-18 NOTE — PROGRESS NOTE ADULT - SUBJECTIVE AND OBJECTIVE BOX
Patient is a 39y old  Male who presents with a chief complaint of sob (02 Mar 2025 06:19)       INTERVAL HPI/OVERNIGHT EVENTS:  Patient seen and evaluated at bedside.  Pt is resting comfortable in NAD. Denies N/V/F/C.      Allergies    No Known Allergies    Intolerances        Vital Signs Last 24 Hrs  T(C): 38 (18 Mar 2025 08:00), Max: 39.2 (17 Mar 2025 20:00)  T(F): 100.4 (18 Mar 2025 08:00), Max: 102.5 (17 Mar 2025 20:00)  HR: 111 (18 Mar 2025 13:30) (111 - 139)  BP: 127/54 (18 Mar 2025 08:00) (127/54 - 151/60)  BP(mean): 91 (18 Mar 2025 04:00) (78 - 96)  RR: 25 (18 Mar 2025 10:01) (24 - 34)  SpO2: 97% (18 Mar 2025 13:30) (91% - 100%)    Parameters below as of 18 Mar 2025 13:30  Patient On (Oxygen Delivery Method): tracheostomy collar        LABS:                        8.5    10.54 )-----------( 164      ( 18 Mar 2025 06:30 )             30.5     03-18    137  |  96[L]  |  11  ----------------------------<  126[H]  4.1   |  30  |  0.71    Ca    8.4      18 Mar 2025 06:30  Phos  3.4     03-18  Mg     1.90     03-18      PTT - ( 17 Mar 2025 06:19 )  PTT:30.7 sec  Urinalysis Basic - ( 18 Mar 2025 06:30 )    Color: x / Appearance: x / SG: x / pH: x  Gluc: 126 mg/dL / Ketone: x  / Bili: x / Urobili: x   Blood: x / Protein: x / Nitrite: x   Leuk Esterase: x / RBC: x / WBC x   Sq Epi: x / Non Sq Epi: x / Bacteria: x      CAPILLARY BLOOD GLUCOSE      POCT Blood Glucose.: 123 mg/dL (18 Mar 2025 11:53)  POCT Blood Glucose.: 127 mg/dL (18 Mar 2025 06:59)  POCT Blood Glucose.: 123 mg/dL (17 Mar 2025 23:19)  POCT Blood Glucose.: 106 mg/dL (17 Mar 2025 17:20)      Lower Extremity Physical Exam:  Vascular: DP 1/4 B/L, PT 0/4, B/L secondary to +3 pitting edema, CFT <3 seconds B/L, Temperature gradient warm to cool, B/L.   Neuro: sensation dim to level of digits   Musculoskeletal/Ortho: unremarkable   Skin: L foot plantar lateral forefoot serous bullae with serosanginous drainage, ischemic changes to fifth digit, no malodor, no pus, no erythema, no acute signs of infection. R foot plantar lateral forefoot serous bullae with serous drainage, ischemic changes to fifth digit, no malodor, no pus, no erythema, no acute signs of infection    RADIOLOGY & ADDITIONAL TESTS:

## 2025-03-18 NOTE — PROGRESS NOTE ADULT - NS ATTEND AMEND GEN_ALL_CORE FT
40 yo M w/ class III obesity, pAfib (no AC), HTN, BEA on CPAP, history of b/l papilledema attributed to pseudotumor cerebri who was transferred from [facility] on 2/26 for VV ECMO 2/2 ARDS. TTE/ROBERT with RV failure and was diuresed. s/p treatment of pneumonia. Now s/p trach and peg. ECMO catheter decannulated 3/7. Course complicated by sepsis with fungemia of unclear source.      Plan:     - ARDS s/p VV ecmo. Currently trach dependent, C/W nebs/airway clearance. wean to TC and use PMV for communication.   - OOB, ambulate as tolerated, PT  - C/W oral diuresis.   - Failed S/S eval. Will consider repeat as he becomes stronger.   - c/w TC during the day and vent at night   - Increase seroquel 25mg QHS for insomnia   - C/W tube feeds via peg  - Patient found to be fungemic with candida albicans in 2/4 bottles from blood cx from 3/15  - Most like source is the patient's panus, which on CT showed panniculitis  - TTE negative for vegtation   - c/w Caspo   - Zosyn to finish today   - Repeat Blood cx in the AM    - Appreciate ID input   - DVT noted on repeat duplex. C/W full ac on Eliquis   - Hx of afib, [id number] of 2. c/w full AC  - Dispo- full code. PT. OOB to chair.

## 2025-03-18 NOTE — ADVANCED PRACTICE NURSE CONSULT - RECOMMEDATIONS
Recommending referral to Wound MD team.   Recommending re-consult from Podiatry due to large bullae to b/l plantars.     Topical recommendations:   ---Sacrum, gluteal cleft, bilateral buttocks: Cleanse with skin cleanser, pat dry. Apply TRIAD paste twice a day and PRN with incontinent episodes. With episodes of incontinence only remove soiled layer of Triad, then reinforce with thin layer.   ---Bilateral plantar feet: Apply Liquid barrier film daily, continue to offload with positioning pillows.   ---Bilateral abdominal pannus, groin: After cleansing, apply Interdry textile sheeting, under intertriginous folds leaving 2 inches exposed at ends to wick, remove to wash & dry affected area, then replace. Individual sheeting may be used for up to 5 days unless soiled. Inspect skin daily.   ---Peritubular site (PEG tube): Cleanse q shift with NS, apply liquid barrier film beneath matt disc.  If redness noted under matt disc bumper apply thin foam  dressing without border (Mepilex Lite)- cut to mid dressing with a 'Y' shape.   Secondary securement to abdomen taping in 'H' fashion with Steri-strips.   ---Tracheostomy: Cleanse around trach site with NS. Pat dry. Apply Silicone foam dressing without border beneath trach collar, cut mid dressing to "Y" shape. Change foam dressing every shift and prn. Change trach ties every 3 days.     While inpatient, continue low air loss bed therapy, continue heel elevation, continue to turn & reposition as per protocol, soft pillow between bony prominences, continue moisture management with barrier creams & single breathable pad, continue measures to decrease friction/shear/pressure. Continue with nutritional support as per dietary/orders.     Plan of care discussed with Primary RN,    Please contact Wound/Ostomy Care Service Line if we can be of further assistance (ext [phone]).  Recommending referral to Wound MD team.   Recommending re-consult from Podiatry due to large bullae to b/l plantars.   Patient received CT A&P on 3/17, consider further imaging of buttocks area due to induration.     Topical recommendations:   ---Sacrum, gluteal cleft, bilateral buttocks: Cleanse with skin cleanser, pat dry. Apply TRIAD paste twice a day and PRN with incontinent episodes. With episodes of incontinence only remove soiled layer of Triad, then reinforce with thin layer.   ---Bilateral plantar feet: Apply Liquid barrier film daily, continue to offload with positioning pillows.   ---Bilateral abdominal pannus, groin: After cleansing, apply Interdry textile sheeting, under intertriginous folds leaving 2 inches exposed at ends to wick, remove to wash & dry affected area, then replace. Individual sheeting may be used for up to 5 days unless soiled. Inspect skin daily.   ---Peritubular site (PEG tube): Cleanse q shift with NS, apply liquid barrier film beneath matt disc.  If redness noted under matt disc bumper apply thin foam  dressing without border (Mepilex Lite)- cut to mid dressing with a 'Y' shape.   Secondary securement to abdomen taping in 'H' fashion with Steri-strips.   ---Tracheostomy: Cleanse around trach site with NS. Pat dry. Apply Silicone foam dressing without border beneath trach collar, cut mid dressing to "Y" shape. Change foam dressing every shift and prn. Change trach ties every 3 days.     While inpatient, continue low air loss bed therapy, continue heel elevation, continue to turn & reposition as per protocol, soft pillow between bony prominences, continue moisture management with barrier creams & single breathable pad, continue measures to decrease friction/shear/pressure. Continue with nutritional support as per dietary/orders.     Plan of care discussed with Primary RN and Vince Dickson (PA).  Please contact Wound/Ostomy Care Service Line if we can be of further assistance (ext [phone]).  Recommending referral to Wound MD team.   Recommending re-consult from Podiatry due to large bullae to b/l plantars.   Recommending STEPHNE/PVR to r/o arterial insufficiency.   Patient received CT A&P on 3/17, consider further imaging of buttocks area due to induration.     Topical recommendations:   ---Sacrum, gluteal cleft, bilateral buttocks: Cleanse with skin cleanser, pat dry. Apply TRIAD paste twice a day and PRN with incontinent episodes. With episodes of incontinence only remove soiled layer of Triad, then reinforce with thin layer.   ---Bilateral plantar feet: Apply Liquid barrier film daily, continue to offload with positioning pillows.   ---Bilateral abdominal pannus, groin: After cleansing, apply Interdry textile sheeting, under intertriginous folds leaving 2 inches exposed at ends to wick, remove to wash & dry affected area, then replace. Individual sheeting may be used for up to 5 days unless soiled. Inspect skin daily.   ---Peritubular site (PEG tube): Cleanse q shift with NS, apply liquid barrier film beneath matt disc.  If redness noted under matt disc bumper apply thin foam  dressing without border (Mepilex Lite)- cut to mid dressing with a 'Y' shape.   Secondary securement to abdomen taping in 'H' fashion with Steri-strips.   ---Tracheostomy: Cleanse around trach site with NS. Pat dry. Apply Silicone foam dressing without border beneath trach collar, cut mid dressing to "Y" shape. Change foam dressing every shift and prn. Change trach ties every 3 days.     While inpatient, continue low air loss bed therapy, continue heel elevation, continue to turn & reposition as per protocol, soft pillow between bony prominences, continue moisture management with barrier creams & single breathable pad, continue measures to decrease friction/shear/pressure. Continue with nutritional support as per dietary/orders.     Plan of care discussed with Primary RN and Vince Dickson (PA).  Please contact Wound/Ostomy Care Service Line if we can be of further assistance (ext [phone]).  Recommending referral to Wound MD team.   Recommending re-consult from Podiatry due to large bullae to b/l plantars.   Recommending STEPHEN/PVR to r/o arterial insufficiency. If abnormal please consult Vascular.   Patient received CT A&P on 3/17, consider further imaging of buttocks area due to induration.     Topical recommendations:   ---Sacrum, gluteal cleft, bilateral buttocks: Cleanse with skin cleanser, pat dry. Apply TRIAD paste twice a day and PRN with incontinent episodes. With episodes of incontinence only remove soiled layer of Triad, then reinforce with thin layer.   ---Bilateral plantar feet: Apply Liquid barrier film daily, continue to offload with positioning pillows.   ---Bilateral abdominal pannus, groin: After cleansing, apply Interdry textile sheeting, under intertriginous folds leaving 2 inches exposed at ends to wick, remove to wash & dry affected area, then replace. Individual sheeting may be used for up to 5 days unless soiled. Inspect skin daily.   ---Peritubular site (PEG tube): Cleanse q shift with NS, apply liquid barrier film beneath matt disc.  If redness noted under matt disc bumper apply thin foam  dressing without border (Mepilex Lite)- cut to mid dressing with a 'Y' shape.   Secondary securement to abdomen taping in 'H' fashion with Steri-strips.   ---Tracheostomy: Cleanse around trach site with NS. Pat dry. Apply Silicone foam dressing without border beneath trach collar, cut mid dressing to "Y" shape. Change foam dressing every shift and prn. Change trach ties every 3 days.     While inpatient, continue low air loss bed therapy, continue heel elevation, continue to turn & reposition as per protocol, soft pillow between bony prominences, continue moisture management with barrier creams & single breathable pad, continue measures to decrease friction/shear/pressure. Continue with nutritional support as per dietary/orders.     Plan of care discussed with Primary RN and Vince Dickson (PA).  Please contact Wound/Ostomy Care Service Line if we can be of further assistance (ext [phone]).

## 2025-03-18 NOTE — ADVANCED PRACTICE NURSE CONSULT - ASSESSMENT
A&O, unable to speak due to trach. Tracheostomy noted to have fading hyperpigmentation and flakey skin to left side. Patient bedbound, obese, incontinent of urine and stool. Incontinence care provided during assessment for pasty brown bowel movement and urine. Skin warm, dry with increased moisture in intertriginous folds, adequate skin turgor, scattered areas of hyperpigmentation and hypopigmentation. LUQ PEG tube, peritubular skin intact. Patient at risk for MAD to abdominal pannus.     Sacralgluteal cleft extending to b/l buttock: Upon assessment, silicone foam with border noted to be on sacrum/buttocks area. 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 Patient turned to right side during assessment. Entire area measured in natural anatomical postioning as [id number]. Difficult to visualize area in natural anatomical position. Presenting with irregular borders. Tissue type with thin, moist firmly attached fibrin/slough and moist red dermis. Small serofibrinous drainage, no odor. Right buttock noted to have induration to areas of denudations, no purulence able to be expressed. Periwound with hyperpigmentation. No increased warmth, no erythema, no edema, no overt signs of infection noted at this time.     Bilateral lower extremities with scattered areas of hyper and hypopigmentation. Dry, flaky skin. Thin, shiny, taught skin. Increased coolness from midfoot to distal toes. +3 Edema. Capillary refill >3 seconds. Nonpalpable DP/PT pulses. Bilateral plantar feet and toes with blood filled blisters likely 2/2 arterial compromise.     Right plantar foot, including planar aspects of 4th and 5th toes, area measured in a cluster: [id number]. No drainage, no odor.     Left plantar foot, including plantar aspects of 4st to 5th toes, area measured in a cluster: [id number]. No drainage, no odor.   Periwounds with no erythema, no increased warmth, no edema, no induration, no fluctuance no signs or symptoms of overt skin infection. Goals of care: offload pressure, protect from friction and shear, monitor for tissue type changes.     Patient continues to be at high risk for skin breakdown. On assessment patient on a low air loss surface, heel offloading pillows, Z-flow positioning pillow utilized, moisture management in place with use of one breathable incontinence pad. Turned and positioned per protocol.  A&O, unable to speak due to trach. Tracheostomy noted to have fading hyperpigmentation and flakey skin to left side. Patient bedbound, obese, incontinent of urine and stool. Incontinence care provided during assessment for pasty brown bowel movement and urine. Skin warm, dry with increased moisture in intertriginous folds, adequate skin turgor, scattered areas of hyperpigmentation and hypopigmentation. LUQ PEG tube, peritubular skin intact. Patient at risk for MAD to abdominal pannus.     Sacralgluteal cleft extending to b/l buttock: Upon assessment, silicone foam with border noted to be on sacrum/buttocks area. 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 Patient turned to right side during assessment. Entire area measured in natural anatomical postioning as [id number]. Difficult to visualize area in natural anatomical position. Presenting with irregular borders. Tissue type with thin, moist firmly attached fibrin/slough and moist red dermis. Small serofibrinous drainage, no odor. Right buttock noted to have induration to areas of denudations, no purulence able to be expressed. Periwound with hyperpigmentation. No increased warmth, no erythema, no edema, no overt signs of infection noted at this time.     Bilateral lower extremities with scattered areas of hyper and hypopigmentation. Dry, flaky skin. Thin, shiny, taught skin. Increased coolness from midfoot to distal toes. +3 Edema. Capillary refill >3 seconds. Nonpalpable DP/PT pulses. Bilateral plantar feet and toes with serous filled blisters likely 2/2 arterial compromise. Lateral 5th digits concern for ischemic changes.    Right plantar foot, including planar aspects of 4th and 5th toes, area measured in a cluster: [id number]. No drainage, no odor.     Left plantar foot, including plantar aspects of 4st to 5th toes, area measured in a cluster: [id number]. No drainage, no odor.   Periwounds with no erythema, no increased warmth, no edema, no induration, no fluctuance no signs or symptoms of overt skin infection. Goals of care: offload pressure, protect from friction and shear, monitor for tissue type changes.     Patient continues to be at high risk for skin breakdown. On assessment patient on a low air loss surface, heel offloading pillows, Z-flow positioning pillow utilized, moisture management in place with use of one breathable incontinence pad. Turned and positioned per protocol.

## 2025-03-18 NOTE — PROGRESS NOTE ADULT - ASSESSMENT
This is a 38 y/o M w/ PMHx AF (not on AC), HTN, BEA, pseudotumor cerebri s/p LP in 2024, initially presented to [facility] on 2/24, SOB, LE edema with URI symptoms and sick contacts, noted to have severe ARDS, intubated however still hypoxia, cannulated for VV ECMO on 2/25, transferred to [facility] on 2/25, started on empiric Vanco, Zosyn for multifocal PNA, abdominal wall cellulitis, s/p trach placement by CT surgery on 3/7, ECMO decannulated on 3/7. Zosyn held on 3/9.  C/b fevers on 3/10, restarted on Zosyn, transferred to [facility] on 3/13, Bcx now w/ yeast.    #Candida albicans fungemia   #Fevers   #Multifocal PNA, abdominal wall cellulitis s/p Vanco/Zosyn  #ARDS, hypoxic respiratory failure requiring VV EMCO 2/2 multifocal PNA? s/p decannulation on 3/7    Overall, 38 y/o M w/ PMHx AF (not on AC), HTN, BEA, pseudotumor cerebri s/p LP in 2024 admitted to [facility] on 2/26 for ARDS, hypoxic respiratory failure requiring VV EMCO 2/2 multifocal PNA? s/p decannulation on 3/7, c/b abdominal wall cellulitis s/p Vancomycin/Zosyn, s/p trach on 3/7, in the setting of persistent fevers despite Zosyn, BCx now w/ yeast, Candida albicans   Unclear source for yeast in BCx, pt had all central lines removed on 3/7, not getting TPN, no abdominal pain on exam, PEG site well appearing.     CT A/P w/o clear abdominal source of fungemia, possibly 2/2 abdominal wall cellulitis? TTE w/o IE.    Recommendations;   1. Caspofungin 70 mg daily (given weight). Can finish 7 days of Zosyn today. (would avoid fluconazole for now given high dose needed)   2. Repeat BCx until negative x 48 hours   3. TTE wnl, possible ROBERT if not clearing      Thank you for consulting us and involving us in the management of this patient's case. In addition to reviewing history, imaging, documents, labs, microbiology, and infection control strategies and potential issues.     ID will continue to follow    Shailesh Owens M.D.  Attending Physician  Division of Infectious Diseases  Department of Medicine    Please contact through MS Teams message.  Office: [phone] (after 5 PM or weekend)

## 2025-03-18 NOTE — PROGRESS NOTE ADULT - SUBJECTIVE AND OBJECTIVE BOX
Infectious Diseases Follow Up:    Patient is a 39y old  Male who presents with a chief complaint of sob (02 Mar 2025 06:19)      Interval History/ROS:  Persistently febrile ON, on cooling blanket, pt denies any abdominal pain. Girlfriend at bedside, updated.     Allergies  No Known Allergies        ANTIMICROBIALS:  caspofungin IVPB 70 every 24 hours  piperacillin/tazobactam IVPB.. 3.375 <User Schedule>      Current Abx:     Previous Abx     OTHER MEDS:  MEDICATIONS  (STANDING):  acetaminophen   Oral Liquid .. 650 every 6 hours PRN  albuterol/ipratropium for Nebulization 3 every 6 hours  amLODIPine   Tablet 10 daily  apixaban 5 every 12 hours  cloNIDine 0.3 every 8 hours  dextrose 50% Injectable 25 once  dextrose 50% Injectable 12.5 once  dextrose 50% Injectable 25 once  furosemide Solution 40 every 12 hours  gabapentin 200 at bedtime  gabapentin Solution 100 two times a day  glucagon  Injectable 1 once  insulin lispro (ADMELOG) corrective regimen sliding scale  every 6 hours  pantoprazole   Suspension 40 daily  polyethylene glycol 3350 17 daily  QUEtiapine 12.5 at bedtime  senna Syrup 5 at bedtime      Vital Signs Last 24 Hrs  T(C): 38 (18 Mar 2025 08:00), Max: 39.2 (17 Mar 2025 20:00)  T(F): 100.4 (18 Mar 2025 08:00), Max: 102.5 (17 Mar 2025 20:00)  HR: 118 (18 Mar 2025 08:00) (111 - 139)  BP: 127/54 (18 Mar 2025 08:00) (127/54 - 151/60)  BP(mean): 91 (18 Mar 2025 04:00) (78 - 96)  RR: 27 (18 Mar 2025 08:00) (24 - 34)  SpO2: 100% (18 Mar 2025 08:00) (91% - 100%)    Parameters below as of 18 Mar 2025 08:00  Patient On (Oxygen Delivery Method): ventilator, cpap        PHYSICAL EXAM:  GENERAL: NAD  HEAD:  Atraumatic, Normocephalic  EYES: EOMI, conjunctiva and sclera clear  NECK: +trach  CHEST/LUNG: Coarse breath sounds  HEART: RRR  ABDOMEN: Soft, Nontender, Nondistended; some thickening/discoloration, lower abdomen. PEG tube well appearing   EXTREMITIES:  No central lines, PIV well appearing   PSYCH: AAO                          8.5    10.54 )-----------( 164      ( 18 Mar 2025 06:30 )             30.5       03-18    137  |  96[L]  |  11  ----------------------------<  126[H]  4.1   |  30  |  0.71    Ca    8.4      18 Mar 2025 06:30  Phos  3.4     03-18  Mg     1.90     03-18        Urinalysis Basic - ( 18 Mar 2025 06:30 )    Color: x / Appearance: x / SG: x / pH: x  Gluc: 126 mg/dL / Ketone: x  / Bili: x / Urobili: x   Blood: x / Protein: x / Nitrite: x   Leuk Esterase: x / RBC: x / WBC x   Sq Epi: x / Non Sq Epi: x / Bacteria: x        MICROBIOLOGY:  v  Blood Blood-Venous  03-16-25   Growth in aerobic bottle: Yeast like cells  --    Growth in aerobic bottle: Yeast like cells      Blood Blood-Peripheral  03-16-25   No growth at 24 hours  --  --      Trach Asp Tracheal Aspirate  03-16-25   Commensal kyle consistent with body site  --    Few polymorphonuclear leukocytes per low power field  Rare Squamous epithelial cells per low power field  Rare Gram Positive Rods seen per oil power field  Rare Gram Negative Rods seen per oil power field      Blood Blood-Venous  03-15-25   Growth in aerobic bottle: Candida albicans  --    Growth in aerobic bottle: Yeast like cells      Blood Blood-Peripheral  03-15-25   Growth in aerobic bottle: Candida albicans  Direct identification is available within approximately 3-5  hours either by Blood Panel Multiplexed PCR or Direct  MALDI-TOF. Details: [email or web address]  --  Blood Culture PCR      Catheterized Catheterized  03-12-25   <10,000 CFU/mL Normal Urogenital Kyle  --  --      Blood Blood-Peripheral  03-11-25   No growth at 5 days  --  --      Blood Blood-Peripheral  03-11-25   No growth at 5 days  --  --      Trach Asp Tracheal Aspirate  03-10-25   Commensal kyle consistent with body site  --    Few polymorphonuclear leukocytes per low power field  No Squamous epithelial cells per low power field  No organisms seen per oil power field      Bronchial Bronchial Lavage  03-05-25   No growth  --    Rare polymorphonuclear leukocytes per low power field  No squamous epithelial cells per low power field  No organisms seen per oil power field      Bronchial Bronchial Lavage  03-01-25   No growth to date  --    Few polymorphonuclear leukocytes per low power field  No Squamous epithelial cells per low power field  No organisms seen per oil power field      Bronchial Bronchial Lavage  02-26-25   No growth  --    Moderate polymorphonuclear leukocytes seen per low power field  No squamous epithelial cells seen per low power field  No organisms seen per oil power field      .Blood Blood  02-26-25   No growth at 5 days  --  --      Catheterized Catheterized  02-26-25   No growth  --  --      Legionella SPUTUM  02-25-25   No Legionella species isolated  --  --      .Sputum Sputum  02-25-25   Commensal kyle consistent with body site  --    Moderate polymorphonuclear leukocytes per low power field  No Squamous epithelial cells per low power field  Rare Gram Positive Cocci in Pairs and Chains per oil power field  Rare Gram Positive Rods per oil power field      Catheterized Catheterized  02-25-25   <10,000 CFU/mL Normal Urogenital Kyle  --  --      .Blood Blood-Peripheral  02-24-25   No growth at 5 days  --  --          Rapid RVP Result: NotDetec (03-16 @ 16:35)  Rapid RVP Result: NotDetec (03-12 @ 07:19)        RADIOLOGY:  < from: CT Abdomen and Pelvis w/ IV Cont (03.17.25 @ 17:14) >    Suboptimal exam secondary to patient's body habitus..    Patchy bilateral lower lobe alveolar opacities may reflect pneumonia as   well as atelectasis.    No gross collection is seen.    Hepatomegaly and resolved ascites.    Decreased abdominal pannus edema    Persistent skin thickening may reflect cellulitis/panniculitis.    < end of copied text >

## 2025-03-18 NOTE — PROGRESS NOTE ADULT - ASSESSMENT
40 YO M with PMHx of morbid obesity, BEA on CPAP, pAFIB (not on AC), HTN, and BL papilledema attributed to pseudotumor cerebri who presented initially to [facility] on 2/24 with SOB and BL LE edema. Found with URI outpatient with progressive SOB, and concern for noted for MFPNA on imaging. Course progressed with AHRF with worsening O2 demand, respiratory distress, secretions, and somnolence requiring intubation (difficult with success after 6 attempts) in ED and admission to [facility] MICU. While in MICU, patient noted with increasing O2 demand with no improvement despite optimal vent management. Concern noted for progressive ARDS, unable to prone given morbid obesity, and ultimately cannulated for vvECMO on 2/25 and transferred to [facility] for further management on 2/26. While at [facility], tx with diuresis and ABX, and ultimately decannulated and s/p 60XLTCP trach and PEG on 3/7. Patient catie transferred to RCU on 3/11 and course complicated by recurrent high grade fevers and found with fungemia.    NEUROLOGY  # Hx of Pseudotumor Cerebri   - s/p LP in 2024 with high opening pressure  - s/p MRI 2024 with possible pituitary mass but unclear because of pressure effect from pseudotumor cerebri causing partially empty sella.  - Prolactin elevated   - ACTH low but recieved steroids during admission   - FT4 low normal and TSH normal, but given FT4 low normal TSH should be higher   - Discuss endocrine consult for inhouse work up vs outpatient.     # Sedation   - Weaned off sedation in ICU   - Nimbex turned off 2/27   - Prop turned off 3/9   - Precedex turned off and catapres started 3/11, however persistent fever spikes noted with concern for precedex withdrawal?   - Continue on catapres 0.3 TID (3/16 - )   - Continue on seroquel 12.5 QHS     # BL LE neuropathy   - CIPN concern   - Continue on neurontin 100 BID and 200 QHS  - Monitor pain    CARDIOVASCULAR  # HTN   - HTN noted in ICU requiring cardene and esmolol GTTs   - Lisinopril dc'ed to increase catapres   - Continue on norvasc 10 and catapres 0.3 TID   - Monitor BP     # AFIB   - Hx of pAFIB   - No RVR episodes or pAFIB episodes in ICU   - No rate control medications needed  - Monitor on telemetry     # RV dilation second to PHTN   - POCUS on arrival to [facility] with RVE concern   - TTE 10/2024 with EF 55-60 with normal LVSF, moderate LVH and normal RVSF and size with no concern for pHTN   - TTE on 2/25 at  with EF 61 and normal LVSF, but enlarged RV size with reduced RVSF, mild TR, mild PHTN with PASP 49, and dilated IVC to 3.4cn, but normal TAPSE 2.1.  - Continue on aggressive diuresis in ICU    - TTE 3/11 with RVSF reduced with TAPSE 1.6. IVC improved to 2.12cm.   - Continue on lasix 40 PO BID   - Monitor IO    RESPIRATORY  # ARDS second to MFPNA   - Hx of BEA on CPAP presented to  post URI with SOB and found with AHRF with progression to ARDS second to post viral MFPNA   - Intubated 2/25   - Cannulated for vvECMO 2/26   - s/p 60XLTCP tracheostomy 3/7   - Decannulated 3/7   - CTSx removed tracheostomy and ECMO sutures on 3/17   - Continue on TC QD with PMV as able with strong phonation  - Continue on PS 18/10/40 QHS given OHS (increased 3/18)   - Eventually when able to tolerate  will need to trial on BIPAP QHS  - Continue on nebs and HTS     GI  # Dysphagia   - s/p PEG 3/7   - Attempted green dye on 3/13 and failed  - Continue on TF  - Continue on miralax and senna  - Reattempt green dye this week     # Mild transaminitis   - LFTs elevated in ICU with TBILI elevated likely from ECMO hemolysis   - US ABD with hepatic steatosis   - Trend LFTs      RENAL  # ELLA   - ELLA likely from cardiorenal with RVE   - Diuresed in ICU and improved   - Monitor renal function and UOP     # Hypernatremia   - Continue on  Q6H (decreased 3/16)    INFECTIOUS DISEASE  # Recurrent fevers with fungemia from unknown source   - Recurrent fevers post ECMO decannulation thought initially to be cytokine surge   - BCx, SCx, UCx, RVP and MRSA RPT on 3/12 negative   - Continued zosyn empirically with improved WBC , however recurrent fevers noted.   - RPT SCx, UA, CXR and RVP negative  - TTE 3/17 with no IE  - PanCT 3/17 with PNA and persistent panniculitis   - BCx 3/15 with candida albicans.   - RPT BCx 3/16 NGTD  - Continue on zosyn (3/12-3/18) empirically   - Continue on caspo (3/17 - )   - Pending wound care for abdominal pannus   - Pending RPT BCx 3/18   - ID following    # MFPNA and abdominal pannus cellulitis   - Presented to HH post URI with SOB and found with AHRF with progression to ARDS second to post viral MFPNA   - RVP, legionella, strept, BAL, BCx, UCx, HIV, PCP, and adenovirus PCR negative   - Initially started on multiple agents in ICU and ultimatelty completed zosyn (2/26-3/9) ZMAX (2/25-2/26) and vanco (2/26-3/1)     # Penile discharge   - GC/ chlamydia negative   - HIV negative   - Monitor for now    # PPX   - MRSA PCR positive and completed bactroban (2/26-3/3)     HEME   # Anemia and thrombocytopenia   - Likely second to shearing with ECMO circuit   - Monitor HH and PLTs post decannulation and on agratroban     VASCULAR   # R IJ and BL LE DVTs   - Post ECMO dopplers on 3/9 negative for BL UE/ LE DVTs.   - Subsequent post ECMO dopplers performed on 3/14 and noted with acute, non-occlusive deep vein thrombosis noted within the right internal jugular vein. acute, occlusive deep vein thromboses noted within the right and left peroneal veins at both mid calves, and age-indeterminate, occlusive deep vein thrombosis visualized within the left gastrocnemius vein at the proximal calf.     - Continue on argatroban GTT and change to eliquis today     PODIATRY   # BL plantar feet blisters likely pressors induced  - Seen by podiatry and blisters lanced on 3/4   - Continue on local wound care per podiatry   - Podiatry following    ENDOCRINE  # Pre-DM2   - A1C 6.7  - Continue on ISS   - Monitor FS   - IF no demand then stop ISS     LINES/ TUBES  - R IJ and R FEM ECMO (2/26-3/7)     SKIN  # Pannus canndial intertrigo   - Continue on clotrimazole cream   - WOC appreciated     ETHICS/ GOC    - FULL CODE     DISPO - PT and PMR recc FLORENCE, however will continue to reassess for acute   38 YO M with PMHx of morbid obesity, BEA on CPAP, pAFIB (not on AC), HTN, and BL papilledema attributed to pseudotumor cerebri who presented initially to [facility] on 2/24 with SOB and BL LE edema. Found with URI outpatient with progressive SOB, and concern for noted for MFPNA on imaging. Course progressed with AHRF with worsening O2 demand, respiratory distress, secretions, and somnolence requiring intubation (difficult with success after 6 attempts) in ED and admission to [facility] MICU. While in MICU, patient noted with increasing O2 demand with no improvement despite optimal vent management. Concern noted for progressive ARDS, unable to prone given morbid obesity, and ultimately cannulated for vvECMO on 2/25 and transferred to [facility] for further management on 2/26. While at [facility], tx with diuresis and ABX, and ultimately decannulated and s/p 60XLTCP trach and PEG on 3/7. Patient catie transferred to RCU on 3/11 and course complicated by recurrent high grade fevers and found with fungemia.    NEUROLOGY  # Hx of Pseudotumor Cerebri   - s/p LP in 2024 with high opening pressure  - s/p MRI 2024 with possible pituitary mass but unclear because of pressure effect from pseudotumor cerebri causing partially empty sella.  - Prolactin elevated   - ACTH low but recieved steroids during admission   - FT4 low normal and TSH normal, but given FT4 low normal TSH should be higher   - Discuss endocrine consult for inhouse work up vs outpatient.     # Sedation   - Weaned off sedation in ICU   - Nimbex turned off 2/27   - Prop turned off 3/9   - Precedex turned off and catapres started 3/11  - Continue on catapres 0.3 TID (3/16 - )     # Insomnia   - Patient worked night shift x 15 years   - Trouble sleeping at night leading to day time sleepiness and poor participation with PT  - Continue on seroquel 25 QHS (increased 3/18)   - Continue on melatonin   - Continue on neurontin as below    # BL LE neuropathy   - CIPN concern   - Continue on neurontin 100 BID and 200 QHS  - Monitor pain    CARDIOVASCULAR  # HTN   - HTN noted in ICU requiring cardene and esmolol GTTs   - Lisinopril dc'ed to increase catapres   - Continue on norvasc 10 and catapres 0.3 TID   - Monitor BP     # AFIB   - Hx of pAFIB   - No RVR episodes or pAFIB episodes in ICU   - No rate control medications needed  - Monitor on telemetry     # RV dilation second to PHTN   - POCUS on arrival to [facility] with RVE concern   - TTE 10/2024 with EF 55-60 with normal LVSF, moderate LVH and normal RVSF and size with no concern for pHTN   - TTE on 2/25 at  with EF 61 and normal LVSF, but enlarged RV size with reduced RVSF, mild TR, mild PHTN with PASP 49, and dilated IVC to 3.4cn, but normal TAPSE 2.1.  - Continue on aggressive diuresis in ICU    - TTE 3/11 with RVSF reduced with TAPSE 1.6. IVC improved to 2.12cm.   - Continue on lasix 40 PO BID   - Monitor IO    RESPIRATORY  # ARDS second to MFPNA   - Hx of BEA on CPAP presented to  post URI with SOB and found with AHRF with progression to ARDS second to post viral MFPNA   - Intubated 2/25   - Cannulated for vvECMO 2/26   - s/p 60XLTCP tracheostomy 3/7   - Decannulated 3/7   - CTSx removed tracheostomy and ECMO sutures on 3/17   - Continue on TC QD with PMV as able with strong phonation  - Continue on PS 18/10/40 QHS given OHS (increased 3/18)   - Eventually when able to tolerate  well will need to trial on NIV QHS with .   - Continue on nebs and HTS     GI  # Dysphagia   - s/p PEG 3/7   - Attempted green dye on 3/13 and failed  - Continue on TF  - Continue on miralax and senna  - Reattempt SS pending    # Mild transaminitis   - LFTs elevated in ICU with TBILI elevated likely from ECMO hemolysis   - US ABD with hepatic steatosis   - Trend LFTs      RENAL  # ELLA   - ELLA likely from cardiorenal with RVE   - Diuresed in ICU and improved   - Monitor renal function and UOP     # Hypernatremia   - Continue on  Q6H (decreased 3/16)    INFECTIOUS DISEASE  # Recurrent fevers with fungemia from unknown source   - Recurrent fevers post ECMO decannulation thought initially to be cytokine surge   - BCx, SCx, UCx, RVP and MRSA RPT on 3/12 negative   - Completed zosyn (3/12-3/18) empirically with improved WBC , however recurrent fevers noted.   - RPT SCx, UA, CXR and RVP negative  - TTE 3/17 with no IE  - PanCT 3/17 with PNA and persistent panniculitis   - BCx 3/15 with candida albicans.   - BCx 3/16 with yeast and pending speciation   - Concern for possible source from panniculitis vs wound care evaluation with new buttock wound with induration likely from MAD.   - Continue on caspo (3/17 - ) and pending RPT BCx 3/18   - ID following    # MFPNA and abdominal pannus cellulitis   - Presented to  post URI with SOB and found with AHRF with progression to ARDS second to post viral MFPNA   - RVP, legionella, strept, BAL, BCx, UCx, HIV, PCP, and adenovirus PCR negative   - Initially started on multiple agents in ICU and ultimatelty completed zosyn (2/26-3/9) ZMAX (2/25-2/26) and vanco (2/26-3/1)     # Penile discharge   - GC/ chlamydia negative   - HIV negative   - Monitor for now    # PPX   - MRSA PCR positive and completed bactroban (2/26-3/3)     HEME   # Anemia and thrombocytopenia   - Likely second to shearing with ECMO circuit   - Monitor HH and PLTs post decannulation and on agratroban     VASCULAR   # R IJ and BL LE DVTs   - Post ECMO dopplers on 3/9 negative for BL UE/ LE DVTs.   - Subsequent post ECMO dopplers performed on 3/14 and noted with acute, non-occlusive deep vein thrombosis noted within the right internal jugular vein. acute, occlusive deep vein thromboses noted within the right and left peroneal veins at both mid calves, and age-indeterminate, occlusive deep vein thrombosis visualized within the left gastrocnemius vein at the proximal calf.     - Continue on argatroban GTT and change to eliquis today     PODIATRY   # BL plantar feet blisters likely pressors induced  - Seen by podiatry and blisters lanced on 3/4 and again on 3/18  - Continue on local wound care per podiatry   - Podiatry following    ENDOCRINE  # Pre-DM2   - A1C 6.7  - Continue on ISS   - Monitor FS   - IF no demand then stop ISS     LINES/ TUBES  - R IJ and R FEM ECMO (2/26-3/7)     SKIN  # Pannus canndial intertrigo   - Continue on clotrimazole cream   - WOC appreciated     ETHICS/ GOC    - FULL CODE     DISPO - PT and PMR recc FLORENCE, however will continue to reassess for acute

## 2025-03-19 LAB
-  FLUCONAZOLE: SIGNIFICANT CHANGE UP
ADD ON TEST-SPECIMEN IN LAB: SIGNIFICANT CHANGE UP
ANION GAP SERPL CALC-SCNC: 7 MMOL/L — SIGNIFICANT CHANGE UP (ref 7–14)
BLD GP AB SCN SERPL QL: NEGATIVE — SIGNIFICANT CHANGE UP
BLOOD GAS VENOUS COMPREHENSIVE RESULT: SIGNIFICANT CHANGE UP
BLOOD GAS VENOUS COMPREHENSIVE RESULT: SIGNIFICANT CHANGE UP
BUN SERPL-MCNC: 10 MG/DL — SIGNIFICANT CHANGE UP (ref 7–23)
CALCIUM SERPL-MCNC: 8.4 MG/DL — SIGNIFICANT CHANGE UP (ref 8.4–10.5)
CHLORIDE SERPL-SCNC: 98 MMOL/L — SIGNIFICANT CHANGE UP (ref 98–107)
CO2 SERPL-SCNC: 35 MMOL/L — HIGH (ref 22–31)
CREAT SERPL-MCNC: 0.75 MG/DL — SIGNIFICANT CHANGE UP (ref 0.5–1.3)
CULTURE RESULTS: ABNORMAL
EGFR: 119 ML/MIN/1.73M2 — SIGNIFICANT CHANGE UP
EGFR: 119 ML/MIN/1.73M2 — SIGNIFICANT CHANGE UP
FERRITIN SERPL-MCNC: 417 NG/ML — HIGH (ref 30–400)
GLUCOSE BLDC GLUCOMTR-MCNC: 118 MG/DL — HIGH (ref 70–99)
GLUCOSE BLDC GLUCOMTR-MCNC: 120 MG/DL — HIGH (ref 70–99)
GLUCOSE BLDC GLUCOMTR-MCNC: 125 MG/DL — HIGH (ref 70–99)
GLUCOSE BLDC GLUCOMTR-MCNC: 145 MG/DL — HIGH (ref 70–99)
GLUCOSE SERPL-MCNC: 135 MG/DL — HIGH (ref 70–99)
HCT VFR BLD CALC: 25.4 % — LOW (ref 39–50)
HCT VFR BLD CALC: 26.1 % — LOW (ref 39–50)
HCT VFR BLD CALC: 26.7 % — LOW (ref 39–50)
HGB BLD-MCNC: 7.1 G/DL — LOW (ref 13–17)
HGB BLD-MCNC: 7.3 G/DL — LOW (ref 13–17)
HGB BLD-MCNC: 7.4 G/DL — LOW (ref 13–17)
IRON SATN MFR SERPL: 13 % — LOW (ref 14–50)
IRON SATN MFR SERPL: 21 UG/DL — LOW (ref 45–165)
MAGNESIUM SERPL-MCNC: 1.9 MG/DL — SIGNIFICANT CHANGE UP (ref 1.6–2.6)
MCHC RBC-ENTMCNC: 23.7 PG — LOW (ref 27–34)
MCHC RBC-ENTMCNC: 23.8 PG — LOW (ref 27–34)
MCHC RBC-ENTMCNC: 24 PG — LOW (ref 27–34)
MCHC RBC-ENTMCNC: 27.7 G/DL — LOW (ref 32–36)
MCHC RBC-ENTMCNC: 28 G/DL — LOW (ref 32–36)
MCHC RBC-ENTMCNC: 28 G/DL — LOW (ref 32–36)
MCV RBC AUTO: 84.9 FL — SIGNIFICANT CHANGE UP (ref 80–100)
MCV RBC AUTO: 85 FL — SIGNIFICANT CHANGE UP (ref 80–100)
MCV RBC AUTO: 86.7 FL — SIGNIFICANT CHANGE UP (ref 80–100)
METHOD TYPE: SIGNIFICANT CHANGE UP
NRBC # BLD AUTO: 0 K/UL — SIGNIFICANT CHANGE UP (ref 0–0)
NRBC # FLD: 0 K/UL — SIGNIFICANT CHANGE UP (ref 0–0)
NRBC BLD AUTO-RTO: 0 /100 WBCS — SIGNIFICANT CHANGE UP (ref 0–0)
ORGANISM # SPEC MICROSCOPIC CNT: ABNORMAL
PHOSPHATE SERPL-MCNC: 3.9 MG/DL — SIGNIFICANT CHANGE UP (ref 2.5–4.5)
PLATELET # BLD AUTO: 244 K/UL — SIGNIFICANT CHANGE UP (ref 150–400)
PLATELET # BLD AUTO: 244 K/UL — SIGNIFICANT CHANGE UP (ref 150–400)
PLATELET # BLD AUTO: 266 K/UL — SIGNIFICANT CHANGE UP (ref 150–400)
POTASSIUM SERPL-MCNC: 3.7 MMOL/L — SIGNIFICANT CHANGE UP (ref 3.5–5.3)
POTASSIUM SERPL-SCNC: 3.7 MMOL/L — SIGNIFICANT CHANGE UP (ref 3.5–5.3)
RBC # BLD: 2.99 M/UL — LOW (ref 4.2–5.8)
RBC # BLD: 3.07 M/UL — LOW (ref 4.2–5.8)
RBC # BLD: 3.08 M/UL — LOW (ref 4.2–5.8)
RBC # FLD: 22.9 % — HIGH (ref 10.3–14.5)
RBC # FLD: 23.2 % — HIGH (ref 10.3–14.5)
RBC # FLD: 23.2 % — HIGH (ref 10.3–14.5)
RH IG SCN BLD-IMP: POSITIVE — SIGNIFICANT CHANGE UP
SODIUM SERPL-SCNC: 140 MMOL/L — SIGNIFICANT CHANGE UP (ref 135–145)
SPECIMEN SOURCE: SIGNIFICANT CHANGE UP
TIBC SERPL-MCNC: 157 UG/DL — LOW (ref 220–430)
UIBC SERPL-MCNC: 136 UG/DL — SIGNIFICANT CHANGE UP (ref 110–370)
WBC # BLD: 8.9 K/UL — SIGNIFICANT CHANGE UP (ref 3.8–10.5)
WBC # BLD: 9.09 K/UL — SIGNIFICANT CHANGE UP (ref 3.8–10.5)
WBC # BLD: 9.65 K/UL — SIGNIFICANT CHANGE UP (ref 3.8–10.5)
WBC # FLD AUTO: 8.9 K/UL — SIGNIFICANT CHANGE UP (ref 3.8–10.5)
WBC # FLD AUTO: 9.09 K/UL — SIGNIFICANT CHANGE UP (ref 3.8–10.5)
WBC # FLD AUTO: 9.65 K/UL — SIGNIFICANT CHANGE UP (ref 3.8–10.5)

## 2025-03-19 PROCEDURE — G0545: CPT

## 2025-03-19 PROCEDURE — 99223 1ST HOSP IP/OBS HIGH 75: CPT

## 2025-03-19 PROCEDURE — 99232 SBSQ HOSP IP/OBS MODERATE 35: CPT

## 2025-03-19 PROCEDURE — 99233 SBSQ HOSP IP/OBS HIGH 50: CPT

## 2025-03-19 RX ORDER — ACETAMINOPHEN 500 MG/5ML
1000 LIQUID (ML) ORAL ONCE
Refills: 0 | Status: COMPLETED | OUTPATIENT
Start: 2025-03-19 | End: 2025-03-19

## 2025-03-19 RX ORDER — HYDROMORPHONE/SOD CHLOR,ISO/PF 2 MG/10 ML
0.5 SYRINGE (ML) INJECTION ONCE
Refills: 0 | Status: DISCONTINUED | OUTPATIENT
Start: 2025-03-19 | End: 2025-03-19

## 2025-03-19 RX ORDER — HYDROMORPHONE/SOD CHLOR,ISO/PF 2 MG/10 ML
0.2 SYRINGE (ML) INJECTION EVERY 6 HOURS
Refills: 0 | Status: DISCONTINUED | OUTPATIENT
Start: 2025-03-19 | End: 2025-03-20

## 2025-03-19 RX ORDER — MAGNESIUM SULFATE 500 MG/ML
1 SYRINGE (ML) INJECTION ONCE
Refills: 0 | Status: COMPLETED | OUTPATIENT
Start: 2025-03-19 | End: 2025-03-19

## 2025-03-19 RX ADMIN — Medication 15 MILLILITER(S): at 12:17

## 2025-03-19 RX ADMIN — Medication 650 MILLIGRAM(S): at 09:34

## 2025-03-19 RX ADMIN — Medication 0.2 MILLIGRAM(S): at 12:17

## 2025-03-19 RX ADMIN — AMLODIPINE BESYLATE 10 MILLIGRAM(S): 10 TABLET ORAL at 05:56

## 2025-03-19 RX ADMIN — Medication 650 MILLIGRAM(S): at 17:35

## 2025-03-19 RX ADMIN — IPRATROPIUM BROMIDE AND ALBUTEROL SULFATE 3 MILLILITER(S): .5; 2.5 SOLUTION RESPIRATORY (INHALATION) at 14:35

## 2025-03-19 RX ADMIN — Medication 400 MILLIGRAM(S): at 20:59

## 2025-03-19 RX ADMIN — Medication 100 GRAM(S): at 12:17

## 2025-03-19 RX ADMIN — GABAPENTIN 100 MILLIGRAM(S): 400 CAPSULE ORAL at 05:56

## 2025-03-19 RX ADMIN — Medication 15 MILLILITER(S): at 05:57

## 2025-03-19 RX ADMIN — Medication 0.5 MILLIGRAM(S): at 02:52

## 2025-03-19 RX ADMIN — IPRATROPIUM BROMIDE AND ALBUTEROL SULFATE 3 MILLILITER(S): .5; 2.5 SOLUTION RESPIRATORY (INHALATION) at 07:37

## 2025-03-19 RX ADMIN — IPRATROPIUM BROMIDE AND ALBUTEROL SULFATE 3 MILLILITER(S): .5; 2.5 SOLUTION RESPIRATORY (INHALATION) at 03:39

## 2025-03-19 RX ADMIN — INSULIN LISPRO 1: 100 INJECTION, SOLUTION INTRAVENOUS; SUBCUTANEOUS at 00:01

## 2025-03-19 RX ADMIN — APIXABAN 5 MILLIGRAM(S): 2.5 TABLET, FILM COATED ORAL at 17:37

## 2025-03-19 RX ADMIN — FUROSEMIDE 40 MILLIGRAM(S): 10 INJECTION INTRAMUSCULAR; INTRAVENOUS at 17:37

## 2025-03-19 RX ADMIN — GABAPENTIN 200 MILLIGRAM(S): 400 CAPSULE ORAL at 21:00

## 2025-03-19 RX ADMIN — Medication 0.5 MILLIGRAM(S): at 00:25

## 2025-03-19 RX ADMIN — CLOTRIMAZOLE 1 APPLICATION(S): 1 CREAM TOPICAL at 17:38

## 2025-03-19 RX ADMIN — Medication 40 MILLIGRAM(S): at 12:17

## 2025-03-19 RX ADMIN — Medication 2 MILLIGRAM(S): at 21:21

## 2025-03-19 RX ADMIN — FUROSEMIDE 40 MILLIGRAM(S): 10 INJECTION INTRAMUSCULAR; INTRAVENOUS at 05:56

## 2025-03-19 RX ADMIN — Medication 0.2 MILLIGRAM(S): at 19:59

## 2025-03-19 RX ADMIN — IPRATROPIUM BROMIDE AND ALBUTEROL SULFATE 3 MILLILITER(S): .5; 2.5 SOLUTION RESPIRATORY (INHALATION) at 21:56

## 2025-03-19 RX ADMIN — GABAPENTIN 100 MILLIGRAM(S): 400 CAPSULE ORAL at 17:37

## 2025-03-19 RX ADMIN — Medication 0.5 MILLIGRAM(S): at 03:22

## 2025-03-19 RX ADMIN — Medication 0.3 MILLIGRAM(S): at 05:56

## 2025-03-19 RX ADMIN — Medication 40 MILLIEQUIVALENT(S): at 12:17

## 2025-03-19 RX ADMIN — Medication 650 MILLIGRAM(S): at 17:58

## 2025-03-19 RX ADMIN — CASPOFUNGIN ACETATE 260 MILLIGRAM(S): 5 INJECTION, POWDER, LYOPHILIZED, FOR SOLUTION INTRAVENOUS at 05:54

## 2025-03-19 RX ADMIN — Medication 0.3 MILLIGRAM(S): at 13:07

## 2025-03-19 RX ADMIN — Medication 15 MILLILITER(S): at 17:37

## 2025-03-19 RX ADMIN — Medication 0.5 MILLIGRAM(S): at 00:55

## 2025-03-19 RX ADMIN — APIXABAN 5 MILLIGRAM(S): 2.5 TABLET, FILM COATED ORAL at 05:56

## 2025-03-19 RX ADMIN — CLOTRIMAZOLE 1 APPLICATION(S): 1 CREAM TOPICAL at 05:57

## 2025-03-19 RX ADMIN — Medication 1 APPLICATION(S): at 05:57

## 2025-03-19 NOTE — SWALLOW BEDSIDE ASSESSMENT ADULT - ASR SWALLOW RECOMMEND DIAG
Objective assessment is warranted/FEES Objective assessment is warranted as patient with Tracheostomy which may impede hyolaryngeal elevation/excursion for adequate airway protection and determine patient's candidacy for oral diet program.

## 2025-03-19 NOTE — SWALLOW BEDSIDE ASSESSMENT ADULT - ADDITIONAL RECOMMENDATIONS
This service to follow up as schedule permits.   Medical team advised to reconsult should there be a change in patient status. This service to follow up to objectively assess oropharyngeal swallow function as schedule permits.   Medical team advised to reconsult should there be a change in patient status.

## 2025-03-19 NOTE — PROGRESS NOTE ADULT - ASSESSMENT
This is a 38 y/o M w/ PMHx AF (not on AC), HTN, BEA, pseudotumor cerebri s/p LP in 2024, initially presented to [place] on 2/24, SOB, LE edema with URI symptoms and sick contacts, noted to have severe ARDS, intubated however still hypoxia, cannulated for VV ECMO on 2/25, transferred to [facility] on 2/25, started on empiric Vanco, Zosyn for multifocal PNA, abdominal wall cellulitis, s/p trach placement by CT surgery on 3/7, ECMO decannulated on 3/7. Zosyn held on 3/9.  C/b fevers on 3/10, restarted on Zosyn, transferred to [facility] on 3/13, Bcx now w/ yeast.    #Candida albicans fungemia   #Fevers   #Multifocal PNA, abdominal wall cellulitis s/p Vanco/Zosyn  #ARDS, hypoxic respiratory failure requiring VV EMCO 2/2 multifocal PNA? s/p decannulation on 3/7    Overall, 38 y/o M w/ PMHx AF (not on AC), HTN, BEA, pseudotumor cerebri s/p LP in 2024 admitted to [facility] on 2/26 for ARDS, hypoxic respiratory failure requiring VV EMCO 2/2 multifocal PNA? s/p decannulation on 3/7, c/b abdominal wall cellulitis s/p Vancomycin/Zosyn, s/p trach on 3/7, in the setting of persistent fevers despite Zosyn, BCx now w/ yeast, Candida albicans   Unclear source for yeast in BCx, pt had all central lines removed on 3/7, not getting TPN, no abdominal pain on exam, PEG site well appearing.     CT A/P w/o clear abdominal source of fungemia, possibly 2/2 abdominal wall cellulitis? TTE w/o IE.    Recommendations;   1. Caspofungin 70 mg daily (given weight) (would avoid fluconazole for now given high dose needed)   2. Repeat BCx on 3/18 in lab   3. TTE wnl, possible ROBERT if not clearing      Thank you for consulting us and involving us in the management of this patient's case. In addition to reviewing history, imaging, documents, labs, microbiology, and infection control strategies and potential issues.     ID will continue to follow    Shailesh Owens M.D.  Attending Physician  Division of Infectious Diseases  Department of Medicine    Please contact through MS Teams message.  Office: [phone] (after 5 PM or weekend)

## 2025-03-19 NOTE — SWALLOW BEDSIDE ASSESSMENT ADULT - SWALLOW EVAL: DIAGNOSIS
Green-Dye Swallow Assessment completed to subjectively assess for Gross Aspiration. Patient accepted 3 ounces of Puree and 3 ounces of Moderately-Thick Liquids impregnated with green food color dye. Oral stage characterized by adequate oral containment with timely oral transit of all trials. No oral residue noted. Pharyngeal stage marked by swallow initiaiton with hyolaryngeal elevation/excursion evidenced via digital palpation. No overt s/s airway penetration/aspiration evidenced following PO intake of puree and moderately-thick liquid trials. SPO2 remained at 100% with  in place throughout PO trials. RN present who removed  following PO trials and provided immediate tracheal suctioning. Green-Dye Swallow Re-Assessment completed to subjectively assess for Gross Aspiration. Patient accepted 3 ounces of Puree and 3 ounces of Moderately-Thick Liquids impregnated with green food color dye. Oral stage characterized by adequate oral containment, timely oral transit of all trials and functional oral clearance with all PO trials. Pharyngeal stage marked by swallow initiaiton with hyolaryngeal elevation/excursion evidenced via digital palpation, with no overt s/s airway penetration/aspiration evidenced following PO intake of puree and moderately-thick liquid trials. SPO2 remained at 100% with  in place throughout PO trials. RN present who removed  following PO trials and provided tracheal suctioning (immediately and 30 minutes post PO trials). NO evidence of green dye return observed immediately or 30 minutes post PO trials.

## 2025-03-19 NOTE — SWALLOW BEDSIDE ASSESSMENT ADULT - COMMENTS
3/19 Pulmonology Note: "38 YO M with PMHx of morbid obesity, BEA on CPAP, pAFIB (not on AC), HTN, and BL papilledema attributed to pseudotumor cerebri who presented initially to [facility] on 2/24 with SOB and BL LE edema. Found with URI outpatient with progressive SOB, and concern for noted for MFPNA on imaging. Course progressed with AHRF with worsening O2 demand, respiratory distress, secretions, and somnolence requiring intubation (difficult with success after 6 attempts) in ED and admission to [facility] MICU. While in MICU, patient noted with increasing O2 demand with no improvement despite optimal vent management. Concern noted for progressive ARDS, unable to prone given morbid obesity, and ultimately cannulated for vvECMO on 2/25 and transferred to [facility] for further management on 2/26. While at [facility], tx with diuresis and ABX, and ultimately decannulated and s/p 60XLTCP trach and PEG on 3/7. Patient ultimately transferred to [facility] on 3/11 and course complicated by recurrent high grade fevers and found with fungemia."    3/17 CT Chest: "IMPRESSION: Suboptimal exam secondary to patient's body habitus. Patchy bilateral lower lobe alveolar opacities may reflect pneumonia as well as atelectasis. No gross collection is seen. Hepatomegaly and resolved ascites. Decreased abdominal pannus edema. Persistent skin thickening may reflect cellulitis/panniculitis."    Patient is known to this service, last seen for green dye swallow assessment on 3/13 with recommendations made to continue NPO with nutrition/hydration/medication administration via PEG in place. See consult for details.     Patient received this afternoon, sitting upright in recliner chair at bedside. Medardo 6XLT Cuffed Tracheostomy in place (Cuff Deflated). O2 via nasal cannula in place with SPO2 100% throughout. Strong voicing noted with  in place. Following verbal education, patient agreeable to participation in PO trials for purpose of Green Dye Re-Assessment. 3/19 Pulmonology Note: "38 YO M with PMHx of morbid obesity, BEA on CPAP, pAFIB (not on AC), HTN, and BL papilledema attributed to pseudotumor cerebri who presented initially to [facility] on 2/24 with SOB and BL LE edema. Found with URI outpatient with progressive SOB, and concern for noted for MFPNA on imaging. Course progressed with AHRF with worsening O2 demand, respiratory distress, secretions, and somnolence requiring intubation (difficult with success after 6 attempts) in ED and admission to [facility] MICU. While in MICU, patient noted with increasing O2 demand with no improvement despite optimal vent management. Concern noted for progressive ARDS, unable to prone given morbid obesity, and ultimately cannulated for vvECMO on 2/25 and transferred to [facility] for further management on 2/26. While at [facility], tx with diuresis and ABX, and ultimately decannulated and s/p 60XLTCP trach and PEG on 3/7. Patient ultimately transferred to RCU on 3/11 and course complicated by recurrent high grade fevers and found with fungemia."    3/17 CT Chest: "IMPRESSION: Suboptimal exam secondary to patient's body habitus. Patchy bilateral lower lobe alveolar opacities may reflect pneumonia as well as atelectasis. No gross collection is seen. Hepatomegaly and resolved ascites. Decreased abdominal pannus edema. Persistent skin thickening may reflect cellulitis/panniculitis."    Patient is known to this service, last seen for green dye swallow assessment on 3/13 with recommendations made to continue NPO with nutrition/hydration/medication administration via PEG in place. See consult for details.     Patient received this afternoon, sitting upright in recliner chair at bedside. Medardo 6XLT Cuffed Tracheostomy in place (Cuff Deflated).  in place with strong voicing noted. 5L O2 via nasal cannula in place with SPO2 100% throughout. Following verbal education, patient agreeable to participation in PO trials for purpose of Green Dye Re-Assessment.

## 2025-03-19 NOTE — CONSULT NOTE ADULT - SUBJECTIVE AND OBJECTIVE BOX
Wound SURGERY CONSULT NOTE    HPI:40 yo M w/ class III obesity, pAfib (no AC), HTN, BEA (on CPAP), history of b/l papilledema attributed to pseudotumor cerebri s/p LP in 2024 with very high opening pressure, who is transferred for VV ECMO for ARDS and severe hypoxia. Patient initially presented to [place] on 2/24 for SOB and b/l LE edema. 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 He is a current every day smoker of Newports and marijuana, but does not drink or do other drugs. Currently lives with his mother. There is a long term partner in his life, but they are not , and they have an on/off relationship currently not very involved with each other as per the patient's mother and aunt.  At [facility] where he was getting an eval last year for shortness of breath, he had a sleep study and was diagnosed with sleep apnea, prescribed a PAP machine, which he has in his room but the mother is not sure how many nights per week he uses it. Recently, the last day or two, his mother and brother have been under the weather with URIs and the patient 2 days ago started to develop a ruynny nose, ough, some wheezing of the chest, as well as worsening shortness of breath and fevers at home. He called his aunt who told him to go get checked out and then he landed at the [place] ED. Patient found to be reportedly hypoxemic to 70% on RA with worsening respiratory status/secretions and somnolence in the ED. Patient was intubated with difficulty (7 attempts) due to very large tongue secretions. Despite optimal vent management, patient remained hypoxemic. Unable to prone due to obesity. Patient cannulated for VV ECMO on 2/25 and transferred to [facility] for further management. [place] labs notable for MRSA PCR -, Fluvid -, urine legionella -, sputum gram stain  with GPC and GPR  CTA chest on 2/24 negative for pulmonary embolism, notable for patchy bilateral lower lobe opacities, hepatomegaly, mild ascites, edema/induration of the abdominal pannus.    LE duplex on 2/25 negative for DVT    TTE on 2/25 showed LV EF 61%, enlarged RV with TAPSE 2.1cm, ePASP at least 49 (Patient had 10/2024 TTE with normal LV and RV with ePASP 14).    Only home med is Lasix. Not on acetazolamide for pseudotumor or on Wegovy (was pending auth) (26 Feb 2025 01:48)    Wound consult requested to assist w/ management of          Current Diet: Diet, NPO with Tube Feed:   Tube Feeding Modality: Gastrostomy  Cristal Garcia Peptide 1.5 (KFPEPT1.5RTH)  Total Volume for 24 Hours (mL): 1080  Continuous  Starting Tube Feed Rate mL per Hour: 35  Increase Tube Feed Rate by (mL): 10     Every 4 hours  Until Goal Tube Feed Rate (mL per Hour): 60  Tube Feed Duration (in Hours): 18  Tube Feed Start Time: 11:00  Tube Feed Stop Time: 05:00  Liquid Protein Supplement     Qty per Day:  4 (03-10-25 @ 10:26)      PAST MEDICAL & SURGICAL HISTORY:  HTN (hypertension)      Atrial fibrillation  paroxysmal      Papilledema of both eyes      Chronic sinusitis      Morbid obesity      No significant past surgical history    REVIEW OF SYSTEMS:  General/ sKIN/MSK: see HPI  All other systems negative    MEDICATIONS  (STANDING):  albuterol/ipratropium for Nebulization 3 milliLiter(s) Nebulizer every 6 hours  amLODIPine   Tablet 10 milliGRAM(s) Oral daily  apixaban 5 milliGRAM(s) Oral every 12 hours  caspofungin IVPB 70 milliGRAM(s) IV Intermittent every 24 hours  chlorhexidine 0.12% Liquid 15 milliLiter(s) Swish and Spit two times a day  chlorhexidine 2% Cloths 1 Application(s) Topical <User Schedule>  cloNIDine 0.3 milliGRAM(s) Oral every 8 hours  clotrimazole 1% Cream 1 Application(s) Topical two times a day  dextrose 50% Injectable 25 Gram(s) IV Push once  dextrose 50% Injectable 12.5 Gram(s) IV Push once  dextrose 50% Injectable 25 Gram(s) IV Push once  furosemide Solution 40 milliGRAM(s) Oral every 12 hours  gabapentin 200 milliGRAM(s) Oral at bedtime  gabapentin Solution 100 milliGRAM(s) Oral two times a day  glucagon  Injectable 1 milliGRAM(s) IntraMuscular once  insulin lispro (ADMELOG) corrective regimen sliding scale   SubCutaneous every 6 hours  melatonin 3 milliGRAM(s) Oral at bedtime  multivitamin/minerals/iron Oral Solution (CENTRUM) 15 milliLiter(s) Oral daily  pantoprazole   Suspension 40 milliGRAM(s) Oral daily  polyethylene glycol 3350 17 Gram(s) Oral daily  QUEtiapine 25 milliGRAM(s) Oral at bedtime  senna Syrup 5 milliLiter(s) Oral at bedtime    MEDICATIONS  (PRN):  acetaminophen   Oral Liquid .. 650 milliGRAM(s) Oral every 6 hours PRN Temp greater or equal to 38C (100.4F), Mild Pain (1 - 3), Moderate Pain (4 - 6)  HYDROmorphone  Injectable 0.2 milliGRAM(s) IV Push every 6 hours PRN Severe Pain (7 - 10)      Allergies    No Known Allergies    Intolerances    SOCIAL HISTORY:     FAMILY HISTORY:    PHYSICAL EXAM:  Vital Signs Last 24 Hrs  T(C): 37.5 (19 Mar 2025 08:00), Max: 38.2 (18 Mar 2025 20:00)  T(F): 99.5 (19 Mar 2025 08:00), Max: 100.7 (18 Mar 2025 20:00)  HR: 119 (19 Mar 2025 08:00) (111 - 130)  BP: 118/64 (19 Mar 2025 08:00) (118/64 - 139/71)  BP(mean): 84 (19 Mar 2025 04:00) (84 - 91)  RR: 26 (19 Mar 2025 08:00) (16 - 26)  SpO2: 95% (19 Mar 2025 08:00) (93% - 100%)    Parameters below as of 19 Mar 2025 08:00  Patient On (Oxygen Delivery Method): tracheostomy collar  O2 Flow (L/min): 10  O2 Concentration (%): 50    Weight (kg): 201 (10 Mar 2025 18:00)  BMI (kg/m2): 69.4 (10 Mar 2025 18:00)    Constitutional: NAD / gaurded but stable,  A&Ox3/ Alert/ Confused  cachectic/ MO/ Obese/ frail  WD/ WN/ WG/ Disheveled  (+) low airloss support surface, (+) fluidized postioining devices, (+) complete cair boots     HEENT:  NC/AT, PERRL, EOMI, mucosa moist, throat clear, trachea midline, neck supple    Cardiovascular: RRR   Respiratory: CTA    Gastrointestinal soft NT/ND (+)BS  (+)PEG (+)ostomy    Neurology  weakened strength & sensation grossly intact/ paraesthesia  nonverbal, no follow commands/ paraplegic    Musculoskeletal:  limited/ FROM, no deformities/ contractures    Vascular: BLE equally warm/ cool,  no cyanosis, clubbing, edema  >LE //BLE edema equal  DP/PT pulses palpable  no overt  ischemia noted  hemosiderin staining    Skin:  moist w/ good turgor  frail,  ecchymosis w/o hematoma  serosanguinous drainage  No odor, erythema, increased warmth, tenderness, induration, fluctuance  FULL NOTE TO FOLLOW WITH FINAL RECS     LABS/ CULTURES/ RADIOLOGY:                        7.3    8.90  )-----------( 244      ( 19 Mar 2025 12:27 )             26.1       140  |  98  |  10  ----------------------------<  135      [03-19-25 @ 06:25]  3.7   |  35  |  0.75        Ca     8.4     [03-19-25 @ 06:25]      Mg     1.90     [03-19-25 @ 06:25]      Phos  3.9     [03-19-25 @ 06:25]    Culture - Blood (collected 03-16-25 @ 16:30)  Source: Blood Blood-Venous  Gram Stain (03-18-25 @ 08:20):    Growth in aerobic bottle: Yeast like cells  Preliminary Report (03-18-25 @ 08:21):    Growth in aerobic bottle: Yeast like cells    Culture - Blood (collected 03-16-25 @ 16:00)  Source: Blood Blood-Peripheral  Gram Stain (03-18-25 @ 11:08):    Growth in aerobic bottle: Yeast like cells  Preliminary Report (03-18-25 @ 11:08):    Growth in aerobic bottle: Yeast like cells    Culture - Blood (collected 03-15-25 @ 20:35)  Source: Blood Blood-Venous  Gram Stain (03-17-25 @ 06:44):    Growth in aerobic bottle: Yeast like cells  Final Report (03-18-25 @ 17:12):    Growth in aerobic bottle: Candida albicans    See previous culture [id number]    Culture - Blood (collected 03-15-25 @ 20:20)  Source: Blood Blood-Peripheral  Gram Stain (03-17-25 @ 03:16):    Growth in aerobic bottle: Yeast like cells  Preliminary Report (03-17-25 @ 18:58):    Growth in aerobic bottle: Candida albicans    Direct identification is available within approximately 3-5    hours either by Blood Panel Multiplexed PCR or Direct    MALDI-TOF. Details: [email or web address]  Organism: Blood Culture PCR (03-17-25 @ 03:26)  Organism: Blood Culture PCR (03-17-25 @ 03:26)      Method Type: PCR      -  Blood PCR Panel: NEG                       Wound SURGERY CONSULT NOTE    HPI:40 yo M w/ class III obesity, pAfib (no AC), HTN, BEA (on CPAP), history of b/l papilledema attributed to pseudotumor cerebri s/p LP in 2024 with very high opening pressure, who is transferred for VV ECMO for ARDS and severe hypoxia. Patient initially presented to [place] on 2/24 for SOB and b/l LE edema. 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 He is a current every day smoker of Newports and marijuana, but does not drink or do other drugs. Currently lives with his mother. There is a long term partner in his life, but they are not , and they have an on/off relationship currently not very involved with each other as per the patient's mother and aunt.  At [facility] where he was getting an eval last year for shortness of breath, he had a sleep study and was diagnosed with sleep apnea, prescribed a PAP machine, which he has in his room but the mother is not sure how many nights per week he uses it. Recently, the last day or two, his mother and brother have been under the weather with URIs and the patient 2 days ago started to develop a runny nose, cough, some wheezing of the chest, as well as worsening shortness of breath and fevers at home. He called his aunt who told him to go get checked out and then he landed at the [place] ED. Patient found to be reportedly hypoxemic to 70% on RA with worsening respiratory status/secretions and somnolence in the ED. Patient was intubated with difficulty (7 attempts) due to very large tongue secretions. Despite optimal vent management, patient remained hypoxemic. Unable to prone due to obesity. Patient cannulated for VV ECMO on 2/25 and transferred to [facility] for further management. [place] labs notable for MRSA PCR -, Fluvid -, urine legionella -, sputum gram stain  with GPC and GPR. CTA chest on 2/24 negative for pulmonary embolism, notable for patchy bilateral lower lobe opacities, hepatomegaly, mild ascites, edema/induration of the abdominal pannus.LE duplex on 2/25 negative for DVT. TTE on 2/25 showed LV EF 61%, enlarged RV with TAPSE 2.1cm, ePASP at least 49 (Patient had 10/2024 TTE with normal LV and RV with ePASP 14). Only home med is Lasix. Not on acetazolamide for pseudotumor or on Wegovy (was pending auth) (26 Feb 2025 01:48)    Wound consult requested to assist w/ management of Sacrum to gluteal cleft full thickness wound present on admission. Patient vented, alert, AOX3, endorses feeling right leg pain with movement. Reports some dyspnea on exertion. Per nurse patient has being getting out of bed to the chair.     Current Diet: Diet, NPO with Tube Feed:   Tube Feeding Modality: Gastrostomy  Thing5 Peptide 1.5 (KFPEPT1.5RTH)  Total Volume for 24 Hours (mL): 1080  Continuous  Starting Tube Feed Rate mL per Hour: 35  Increase Tube Feed Rate by (mL): 10     Every 4 hours  Until Goal Tube Feed Rate (mL per Hour): 60  Tube Feed Duration (in Hours): 18  Tube Feed Start Time: 11:00  Tube Feed Stop Time: 05:00  Liquid Protein Supplement     Qty per Day:  4 (03-10-25 @ 10:26)      PAST MEDICAL & SURGICAL HISTORY:  HTN (hypertension)      Atrial fibrillation  paroxysmal      Papilledema of both eyes      Chronic sinusitis      Morbid obesity      No significant past surgical history    REVIEW OF SYSTEMS:  General/ Skin/MSK: see HPI  All other systems negative    MEDICATIONS  (STANDING):  albuterol/ipratropium for Nebulization 3 milliLiter(s) Nebulizer every 6 hours  amLODIPine   Tablet 10 milliGRAM(s) Oral daily  apixaban 5 milliGRAM(s) Oral every 12 hours  caspofungin IVPB 70 milliGRAM(s) IV Intermittent every 24 hours  chlorhexidine 0.12% Liquid 15 milliLiter(s) Swish and Spit two times a day  chlorhexidine 2% Cloths 1 Application(s) Topical <User Schedule>  cloNIDine 0.3 milliGRAM(s) Oral every 8 hours  clotrimazole 1% Cream 1 Application(s) Topical two times a day  dextrose 50% Injectable 25 Gram(s) IV Push once  dextrose 50% Injectable 12.5 Gram(s) IV Push once  dextrose 50% Injectable 25 Gram(s) IV Push once  furosemide Solution 40 milliGRAM(s) Oral every 12 hours  gabapentin 200 milliGRAM(s) Oral at bedtime  gabapentin Solution 100 milliGRAM(s) Oral two times a day  glucagon  Injectable 1 milliGRAM(s) IntraMuscular once  insulin lispro (ADMELOG) corrective regimen sliding scale   SubCutaneous every 6 hours  melatonin 3 milliGRAM(s) Oral at bedtime  multivitamin/minerals/iron Oral Solution (CENTRUM) 15 milliLiter(s) Oral daily  pantoprazole   Suspension 40 milliGRAM(s) Oral daily  polyethylene glycol 3350 17 Gram(s) Oral daily  QUEtiapine 25 milliGRAM(s) Oral at bedtime  senna Syrup 5 milliLiter(s) Oral at bedtime    MEDICATIONS  (PRN):  acetaminophen   Oral Liquid .. 650 milliGRAM(s) Oral every 6 hours PRN Temp greater or equal to 38C (100.4F), Mild Pain (1 - 3), Moderate Pain (4 - 6)  HYDROmorphone  Injectable 0.2 milliGRAM(s) IV Push every 6 hours PRN Severe Pain (7 - 10)    Allergies    No Known Allergies    Intolerances    SOCIAL HISTORY:     FAMILY HISTORY:    PHYSICAL EXAM:  Vital Signs Last 24 Hrs  T(C): 37.5 (19 Mar 2025 08:00), Max: 38.2 (18 Mar 2025 20:00)  T(F): 99.5 (19 Mar 2025 08:00), Max: 100.7 (18 Mar 2025 20:00)  HR: 119 (19 Mar 2025 08:00) (111 - 130)  BP: 118/64 (19 Mar 2025 08:00) (118/64 - 139/71)  BP(mean): 84 (19 Mar 2025 04:00) (84 - 91)  RR: 26 (19 Mar 2025 08:00) (16 - 26)  SpO2: 95% (19 Mar 2025 08:00) (93% - 100%)    Parameters below as of 19 Mar 2025 08:00  Patient On (Oxygen Delivery Method): tracheostomy collar  O2 Flow (L/min): 10  O2 Concentration (%): 50    Weight (kg): 201 (10 Mar 2025 18:00)  BMI (kg/m2): 69.4 (10 Mar 2025 18:00)    Constitutional: NAD / gaurded but stable,  A&Ox3/ Alert/ Confused  cachectic/ MO/ Obese/ frail  WD/ WN/ WG/ Disheveled  (+) low airloss support surface, (+) fluidized postioining devices, (+) complete cair boots     HEENT:  NC/AT, PERRL, EOMI, mucosa moist, throat clear, trachea midline, neck supple    Cardiovascular: RRR   Respiratory: CTA    Gastrointestinal soft NT/ND (+)BS  (+)PEG (+)ostomy    Neurology  weakened strength & sensation grossly intact/ paraesthesia  nonverbal, no follow commands/ paraplegic    Musculoskeletal:  limited/ FROM, no deformities/ contractures    Vascular: BLE equally warm/ cool,  no cyanosis, clubbing, edema  >LE //BLE edema equal  DP/PT pulses palpable  no overt  ischemia noted  hemosiderin staining    Skin:  moist w/ good turgor  frail,  ecchymosis w/o hematoma  serosanguinous drainage  No odor, erythema, increased warmth, tenderness, induration, fluctuance  FULL NOTE TO FOLLOW WITH FINAL RECS     LABS/ CULTURES/ RADIOLOGY:                        7.3    8.90  )-----------( 244      ( 19 Mar 2025 12:27 )             26.1       140  |  98  |  10  ----------------------------<  135      [03-19-25 @ 06:25]  3.7   |  35  |  0.75        Ca     8.4     [03-19-25 @ 06:25]      Mg     1.90     [03-19-25 @ 06:25]      Phos  3.9     [03-19-25 @ 06:25]    Culture - Blood (collected 03-16-25 @ 16:30)  Source: Blood Blood-Venous  Gram Stain (03-18-25 @ 08:20):    Growth in aerobic bottle: Yeast like cells  Preliminary Report (03-18-25 @ 08:21):    Growth in aerobic bottle: Yeast like cells    Culture - Blood (collected 03-16-25 @ 16:00)  Source: Blood Blood-Peripheral  Gram Stain (03-18-25 @ 11:08):    Growth in aerobic bottle: Yeast like cells  Preliminary Report (03-18-25 @ 11:08):    Growth in aerobic bottle: Yeast like cells    Culture - Blood (collected 03-15-25 @ 20:35)  Source: Blood Blood-Venous  Gram Stain (03-17-25 @ 06:44):    Growth in aerobic bottle: Yeast like cells  Final Report (03-18-25 @ 17:12):    Growth in aerobic bottle: Candida albicans    See previous culture [id number]    Culture - Blood (collected 03-15-25 @ 20:20)  Source: Blood Blood-Peripheral  Gram Stain (03-17-25 @ 03:16):    Growth in aerobic bottle: Yeast like cells  Preliminary Report (03-17-25 @ 18:58):    Growth in aerobic bottle: Candida albicans    Direct identification is available within approximately 3-5    hours either by Blood Panel Multiplexed PCR or Direct    MALDI-TOF. Details: [email or web address]  Organism: Blood Culture PCR (03-17-25 @ 03:26)  Organism: Blood Culture PCR (03-17-25 @ 03:26)      Method Type: PCR      -  Blood PCR Panel: NEG                       Wound SURGERY CONSULT NOTE    HPI:38 yo M w/ class III obesity, pAfib (no AC), HTN, BEA (on CPAP), history of b/l papilledema attributed to pseudotumor cerebri s/p LP in 2024 with very high opening pressure, who is transferred for VV ECMO for ARDS and severe hypoxia. Patient initially presented to [place] on 2/24 for SOB and b/l LE edema. 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 He is a current every day smoker of Newports and marijuana, but does not drink or do other drugs. Currently lives with his mother. There is a long term partner in his life, but they are not , and they have an on/off relationship currently not very involved with each other as per the patient's mother and aunt.  At [facility] where he was getting an eval last year for shortness of breath, he had a sleep study and was diagnosed with sleep apnea, prescribed a PAP machine, which he has in his room but the mother is not sure how many nights per week he uses it. Recently, the last day or two, his mother and brother have been under the weather with URIs and the patient 2 days ago started to develop a runny nose, cough, some wheezing of the chest, as well as worsening shortness of breath and fevers at home. He called his aunt who told him to go get checked out and then he landed at the [place] ED. Patient found to be reportedly hypoxemic to 70% on RA with worsening respiratory status/secretions and somnolence in the ED. Patient was intubated with difficulty (7 attempts) due to very large tongue secretions. Despite optimal vent management, patient remained hypoxemic. Unable to prone due to obesity. Patient cannulated for VV ECMO on 2/25 and transferred to [facility] for further management. [place] labs notable for MRSA PCR -, Fluvid -, urine legionella -, sputum gram stain  with GPC and GPR. CTA chest on 2/24 negative for pulmonary embolism, notable for patchy bilateral lower lobe opacities, hepatomegaly, mild ascites, edema/induration of the abdominal pannus.LE duplex on 2/25 negative for DVT. TTE on 2/25 showed LV EF 61%, enlarged RV with TAPSE 2.1cm, ePASP at least 49 (Patient had 10/2024 TTE with normal LV and RV with ePASP 14). Only home med is Lasix. Not on acetazolamide for pseudotumor or on Wegovy (was pending auth) (26 Feb 2025 01:48)    Wound consult requested to assist w/ management of Sacrum to gluteal cleft full thickness wound present on admission. Patient vented, alert, AOX3, endorses feeling right leg pain with movement. Reports some dyspnea on exertion. Per nurse patient has being getting out of bed to the chair.     Current Diet: Diet, NPO with Tube Feed:   Tube Feeding Modality: Gastrostomy  MBDC Media Peptide 1.5 (KFPEPT1.5RTH)  Total Volume for 24 Hours (mL): 1080  Continuous  Starting Tube Feed Rate mL per Hour: 35  Increase Tube Feed Rate by (mL): 10     Every 4 hours  Until Goal Tube Feed Rate (mL per Hour): 60  Tube Feed Duration (in Hours): 18  Tube Feed Start Time: 11:00  Tube Feed Stop Time: 05:00  Liquid Protein Supplement     Qty per Day:  4 (03-10-25 @ 10:26)      PAST MEDICAL & SURGICAL HISTORY:  HTN (hypertension)      Atrial fibrillation  paroxysmal      Papilledema of both eyes      Chronic sinusitis      Morbid obesity      No significant past surgical history    REVIEW OF SYSTEMS:  General/ Skin/MSK: see HPI  All other systems negative    MEDICATIONS  (STANDING):  albuterol/ipratropium for Nebulization 3 milliLiter(s) Nebulizer every 6 hours  amLODIPine   Tablet 10 milliGRAM(s) Oral daily  apixaban 5 milliGRAM(s) Oral every 12 hours  caspofungin IVPB 70 milliGRAM(s) IV Intermittent every 24 hours  chlorhexidine 0.12% Liquid 15 milliLiter(s) Swish and Spit two times a day  chlorhexidine 2% Cloths 1 Application(s) Topical <User Schedule>  cloNIDine 0.3 milliGRAM(s) Oral every 8 hours  clotrimazole 1% Cream 1 Application(s) Topical two times a day  dextrose 50% Injectable 25 Gram(s) IV Push once  dextrose 50% Injectable 12.5 Gram(s) IV Push once  dextrose 50% Injectable 25 Gram(s) IV Push once  furosemide Solution 40 milliGRAM(s) Oral every 12 hours  gabapentin 200 milliGRAM(s) Oral at bedtime  gabapentin Solution 100 milliGRAM(s) Oral two times a day  glucagon  Injectable 1 milliGRAM(s) IntraMuscular once  insulin lispro (ADMELOG) corrective regimen sliding scale   SubCutaneous every 6 hours  melatonin 3 milliGRAM(s) Oral at bedtime  multivitamin/minerals/iron Oral Solution (CENTRUM) 15 milliLiter(s) Oral daily  pantoprazole   Suspension 40 milliGRAM(s) Oral daily  polyethylene glycol 3350 17 Gram(s) Oral daily  QUEtiapine 25 milliGRAM(s) Oral at bedtime  senna Syrup 5 milliLiter(s) Oral at bedtime    MEDICATIONS  (PRN):  acetaminophen   Oral Liquid .. 650 milliGRAM(s) Oral every 6 hours PRN Temp greater or equal to 38C (100.4F), Mild Pain (1 - 3), Moderate Pain (4 - 6)  HYDROmorphone  Injectable 0.2 milliGRAM(s) IV Push every 6 hours PRN Severe Pain (7 - 10)    Allergies    No Known Allergies    Intolerances    SOCIAL HISTORY:     FAMILY HISTORY:    PHYSICAL EXAM:  Vital Signs Last 24 Hrs  T(C): 37.5 (19 Mar 2025 08:00), Max: 38.2 (18 Mar 2025 20:00)  T(F): 99.5 (19 Mar 2025 08:00), Max: 100.7 (18 Mar 2025 20:00)  HR: 119 (19 Mar 2025 08:00) (111 - 130)  BP: 118/64 (19 Mar 2025 08:00) (118/64 - 139/71)  BP(mean): 84 (19 Mar 2025 04:00) (84 - 91)  RR: 26 (19 Mar 2025 08:00) (16 - 26)  SpO2: 95% (19 Mar 2025 08:00) (93% - 100%)    Parameters below as of 19 Mar 2025 08:00  Patient On (Oxygen Delivery Method): tracheostomy collar  O2 Flow (L/min): 10  O2 Concentration (%): 50    Weight (kg): 201 (10 Mar 2025 18:00)  BMI (kg/m2): 69.4 (10 Mar 2025 18:00)    Constitutional: NAD/Alert, AOX3. Patient assessed with primary RN and ACP/Medical team   Morbid Obese   (+) low airloss support surface (Compella bed), (+) fluidized positioning devices, heels elevated with large Z fluidized positioning device   HEENT: NC/AT, non icteric, mucosa moist, throat clear, trachea midline, neck supple  Cardiovascular: tachy   Respiratory: Tracheostomy collar   Gastrointestinal: soft NT/ND. obese   Increase moisture in ABD pannus and bilateral groin, Interdry textile dressings in place   : penile pouch in place   Neurology: follows commands, functional paraplegic  Musculoskeletal:  limited aROM to lower extremities, able to hold on to rail when turned , no deformities/ contractures  Vascular: Dressings in place to bilateral feet, left foot with noted serous drainage striking through kerlix primary RN to change dressing base on recommendations from podiatry   Skin:  moist w/ good turgor  Right forearm with area of localized erythema and mild induration/hematoma?, no bogginess, no fluctuance, no tenderness   Sacrum/gluteal cleft and bilateral buttocks with full thickness wound exposing mixed tissue type, gluteal cleft area wound partially obscured in patients natural anatomical position. A silicone foam with border was removed.   -Measurements taking separately given patient habitus difficult to measure as one   -Left buttock measuring- [id number]   -Gluteal cleft to right buttock- [id number]   -Gluteal cleft exposing fibrinous yellow/slough/fibrinous tissue, right buttock with pink moist dermis and areas of re-epithelization,, scattered fibrinous tissue. Wound  friable with cleansing. No active bleeding  Psych: calm and cooperative, emotional support provided during turning process     LABS/ CULTURES/ RADIOLOGY:                        7.3    8.90  )-----------( 244      ( 19 Mar 2025 12:27 )             26.1       140  |  98  |  10  ----------------------------<  135      [03-19-25 @ 06:25]  3.7   |  35  |  0.75        Ca     8.4     [03-19-25 @ 06:25]      Mg     1.90     [03-19-25 @ 06:25]      Phos  3.9     [03-19-25 @ 06:25]    Culture - Blood (collected 03-16-25 @ 16:30)  Source: Blood Blood-Venous  Gram Stain (03-18-25 @ 08:20):    Growth in aerobic bottle: Yeast like cells  Preliminary Report (03-18-25 @ 08:21):    Growth in aerobic bottle: Yeast like cells    Culture - Blood (collected 03-16-25 @ 16:00)  Source: Blood Blood-Peripheral  Gram Stain (03-18-25 @ 11:08):    Growth in aerobic bottle: Yeast like cells  Preliminary Report (03-18-25 @ 11:08):    Growth in aerobic bottle: Yeast like cells    Culture - Blood (collected 03-15-25 @ 20:35)  Source: Blood Blood-Venous  Gram Stain (03-17-25 @ 06:44):    Growth in aerobic bottle: Yeast like cells  Final Report (03-18-25 @ 17:12):    Growth in aerobic bottle: Candida albicans    See previous culture [id number]    Culture - Blood (collected 03-15-25 @ 20:20)  Source: Blood Blood-Peripheral  Gram Stain (03-17-25 @ 03:16):    Growth in aerobic bottle: Yeast like cells  Preliminary Report (03-17-25 @ 18:58):    Growth in aerobic bottle: Candida albicans    Direct identification is available within approximately 3-5    hours either by Blood Panel Multiplexed PCR or Direct    MALDI-TOF. Details: [email or web address]  Organism: Blood Culture PCR (03-17-25 @ 03:26)  Organism: Blood Culture PCR (03-17-25 @ 03:26)      Method Type: PCR      -  Blood PCR Panel: NEG        ACC: [id number] EXAM:  CT ABDOMEN AND PELVIS IC   ORDERED BY: WILLA GAYTAN     ACC: [id number] EXAM:  CT CHEST IC   ORDERED BY: WILLA GAYTAN     PROCEDURE DATE:  03/17/2025          INTERPRETATION:  CLINICAL INFORMATION: Yeast in the blood    COMPARISON: 2/24/2025    CONTRAST/COMPLICATIONS:  IV Contrast: Omnipaque 350  90 cc administered   10 cc discarded  Oral Contrast: NONE  .    PROCEDURE:  CT of the Chest, Abdomen and Pelvis was performed.  Sagittal and coronal reformats were performed.    FINDINGS:    Evaluation of solid organs is suboptimal. The flanks are contacting the   gantry resulting in increased artifact.    CHEST:  LUNGS AND LARGE AIRWAYS: Tracheostomy in place. Patent central airways.   Bibasilar patchy opacities.  PLEURA: Small bilateral pleural effusions.  VESSELS: Within normal limits  HEART: Mild cardiomegaly. No pericardial effusion.  MEDIASTINUM AND KENDRA: No lymphadenopathy.  CHEST WALL AND LOWER NECK: Within normal limits.    ABDOMEN AND PELVIS:  LIVER: Hepatomegaly. Apparent hepatic steatosis.  BILE DUCTS: Normal caliber.  GALLBLADDER: Unremarkable at CT.  SPLEEN: Within normal limits.  PANCREAS: Within normal limits.  ADRENALS: Within normal limits.  KIDNEYS/URETERS: Within normal limits.    BLADDER: Partially distended.  REPRODUCTIVE ORGANS: Prostate within normal limits. Scrotal edema    BOWEL: Percutaneous gastrostomy in place. Small direct esophageal hiatal   hernia. No bowel obstruction. Appendix not visualized.  PERITONEUM/RETROPERITONEUM: Resolved perihepatic ascites. No evidence of   pneumoperitoneum.  VESSELS: Suboptimal opacification of the vasculature  LYMPH NODES: No lymphadenopathy.  ABDOMINAL WALL: Partially included skin thickening of the abdominal   pannus. Skin thickening of the medial thighs. Markedly decreased   subcutaneous edema.  BONES: Mild degenerative changes.      IMPRESSION:      Suboptimal exam secondary to patient's body habitus..    Patchy bilateral lower lobe alveolar opacities may reflect pneumonia as   well as atelectasis.    No gross collection is seen.    Hepatomegaly and resolved ascites.    Decreased abdominal pannus edema    Persistent skin thickening may reflect cellulitis/panniculitis.    --- End of Report ---            AMERICA RECINOS MD; Attending Radiologist  This document has been electronically signed. Mar 17 2025  5:38PM

## 2025-03-19 NOTE — PROGRESS NOTE ADULT - ASSESSMENT
38 YO M with PMHx of morbid obesity, BEA on CPAP, pAFIB (not on AC), HTN, and BL papilledema attributed to pseudotumor cerebri who presented initially to [facility] on 2/24 with SOB and BL LE edema. Found with URI outpatient with progressive SOB, and concern for noted for MFPNA on imaging. Course progressed with AHRF with worsening O2 demand, respiratory distress, secretions, and somnolence requiring intubation (difficult with success after 6 attempts) in ED and admission to [facility] MICU. While in MICU, patient noted with increasing O2 demand with no improvement despite optimal vent management. Concern noted for progressive ARDS, unable to prone given morbid obesity, and ultimately cannulated for vvECMO on 2/25 and transferred to [facility] for further management on 2/26. While at [facility], tx with diuresis and ABX, and ultimately decannulated and s/p 60XLTCP trach and PEG on 3/7. Patient catie transferred to RCU on 3/11 and course complicated by recurrent high grade fevers and found with fungemia.    NEUROLOGY  # Hx of Pseudotumor Cerebri   - s/p LP in 2024 with high opening pressure  - s/p MRI 2024 with possible pituitary mass but unclear because of pressure effect from pseudotumor cerebri causing partially empty sella.  - Prolactin elevated   - ACTH low but recieved steroids during admission   - FT4 low normal and TSH normal, but given FT4 low normal TSH should be higher   - Discuss endocrine consult for inhouse work up vs outpatient.     # Sedation   - Weaned off sedation in ICU   - Nimbex turned off 2/27   - Prop turned off 3/9   - Precedex turned off and catapres started 3/11  - Continue on catapres 0.3 TID (3/16 - )     # Insomnia   - Patient worked night shift x 15 years   - Trouble sleeping at night leading to day time sleepiness and poor participation with PT  - Continue on seroquel 25 QHS (increased 3/18)   - Continue on melatonin   - Continue on neurontin as below    # BL LE neuropathy   - CIPN concern   - Continue on neurontin 100 BID and 200 QHS  - Monitor pain    CARDIOVASCULAR  # HTN   - HTN noted in ICU requiring cardene and esmolol GTTs   - Lisinopril dc'ed to increase catapres   - Continue on norvasc 10 and catapres 0.3 TID   - Monitor BP     # AFIB   - Hx of pAFIB   - No RVR episodes or pAFIB episodes in ICU   - No rate control medications needed  - Monitor on telemetry     # RV dilation second to PHTN   - POCUS on arrival to [facility] with RVE concern   - TTE 10/2024 with EF 55-60 with normal LVSF, moderate LVH and normal RVSF and size with no concern for pHTN   - TTE on 2/25 at  with EF 61 and normal LVSF, but enlarged RV size with reduced RVSF, mild TR, mild PHTN with PASP 49, and dilated IVC to 3.4cn, but normal TAPSE 2.1.  - Continue on aggressive diuresis in ICU    - TTE 3/11 with RVSF reduced with TAPSE 1.6. IVC improved to 2.12cm.   - Continue on lasix 40 PO BID   - Monitor IO    RESPIRATORY  # ARDS second to MFPNA   - Hx of BEA on CPAP presented to  post URI with SOB and found with AHRF with progression to ARDS second to post viral MFPNA   - Intubated 2/25   - Cannulated for vvECMO 2/26   - s/p 60XLTCP tracheostomy 3/7   - Decannulated 3/7   - CTSx removed tracheostomy and ECMO sutures on 3/17   - Continue on TC QD with PMV as able with strong phonation  - Continue on PS 18/10/40 QHS given OHS (increased 3/18)   - Eventually when able to tolerate  well will need to trial on NIV QHS with .   - Continue on nebs and HTS     GI  # Dysphagia   - s/p PEG 3/7   - Attempted green dye on 3/13 and failed  - Continue on TF  - Continue on miralax and senna  - Reattempt SS pending    # Mild transaminitis   - LFTs elevated in ICU with TBILI elevated likely from ECMO hemolysis   - US ABD with hepatic steatosis   - Trend LFTs      RENAL  # ELLA   - ELLA likely from cardiorenal with RVE   - Diuresed in ICU and improved   - Monitor renal function and UOP     # Hypernatremia   - Continue on  Q6H (decreased 3/16)    INFECTIOUS DISEASE  # Recurrent fevers with fungemia from unknown source   - Recurrent fevers post ECMO decannulation thought initially to be cytokine surge   - BCx, SCx, UCx, RVP and MRSA RPT on 3/12 negative   - Completed zosyn (3/12-3/18) empirically with improved WBC , however recurrent fevers noted.   - RPT SCx, UA, CXR and RVP negative  - TTE 3/17 with no IE  - PanCT 3/17 with PNA and persistent panniculitis   - BCx 3/15 with candida albicans.   - BCx 3/16 with yeast and pending speciation   - Concern for possible source from panniculitis vs wound care evaluation with new buttock wound with induration likely from MAD.   - Continue on caspo (3/17 - ) and pending RPT BCx 3/18   - ID following    # MFPNA and abdominal pannus cellulitis   - Presented to  post URI with SOB and found with AHRF with progression to ARDS second to post viral MFPNA   - RVP, legionella, strept, BAL, BCx, UCx, HIV, PCP, and adenovirus PCR negative   - Initially started on multiple agents in ICU and ultimatelty completed zosyn (2/26-3/9) ZMAX (2/25-2/26) and vanco (2/26-3/1)     # Penile discharge   - GC/ chlamydia negative   - HIV negative   - Monitor for now    # PPX   - MRSA PCR positive and completed bactroban (2/26-3/3)     HEME   # Anemia and thrombocytopenia   - Likely second to shearing with ECMO circuit   - Monitor HH and PLTs post decannulation and on agratroban     VASCULAR   # R IJ and BL LE DVTs   - Post ECMO dopplers on 3/9 negative for BL UE/ LE DVTs.   - Subsequent post ECMO dopplers performed on 3/14 and noted with acute, non-occlusive deep vein thrombosis noted within the right internal jugular vein. acute, occlusive deep vein thromboses noted within the right and left peroneal veins at both mid calves, and age-indeterminate, occlusive deep vein thrombosis visualized within the left gastrocnemius vein at the proximal calf.     - Continue on argatroban GTT and change to eliquis today     PODIATRY   # BL plantar feet blisters likely pressors induced  - Seen by podiatry and blisters lanced on 3/4 and again on 3/18  - Continue on local wound care per podiatry   - Podiatry following    ENDOCRINE  # Pre-DM2   - A1C 6.7  - Continue on ISS   - Monitor FS   - IF no demand then stop ISS     LINES/ TUBES  - R IJ and R FEM ECMO (2/26-3/7)     SKIN  # Pannus canndial intertrigo   - Continue on clotrimazole cream   - WOC appreciated     ETHICS/ GOC    - FULL CODE     DISPO - PT and PMR recc FLORENCE, however will continue to reassess for acute   38 YO M with PMHx of morbid obesity, BEA on CPAP, pAFIB (not on AC), HTN, and BL papilledema attributed to pseudotumor cerebri who presented initially to [facility] on 2/24 with SOB and BL LE edema. Found with URI outpatient with progressive SOB, and concern for noted for MFPNA on imaging. Course progressed with AHRF with worsening O2 demand, respiratory distress, secretions, and somnolence requiring intubation (difficult with success after 6 attempts) in ED and admission to [facility] MICU. While in MICU, patient noted with increasing O2 demand with no improvement despite optimal vent management. Concern noted for progressive ARDS, unable to prone given morbid obesity, and ultimately cannulated for vvECMO on 2/25 and transferred to [facility] for further management on 2/26. While at [facility], tx with diuresis and ABX, and ultimately decannulated and s/p 60XLTCP trach and PEG on 3/7. Patient ultimately transferred to RCU on 3/11 and course complicated by recurrent high grade fevers and found with fungemia.    NEUROLOGY  # Hx of Pseudotumor Cerebri   - s/p LP in 2024 with high opening pressure  - s/p MRI 2024 with possible pituitary mass but unclear because of pressure effect from pseudotumor cerebri causing partially empty sella.  - Prolactin elevated   - ACTH low but recieved steroids during admission   - FT4 low normal and TSH normal, but given FT4 low normal TSH should be higher   - Discuss endocrine consult for inhouse work up vs outpatient.     # Sedation   - Weaned off sedation in ICU   - Nimbex turned off 2/27   - Prop turned off 3/9   - Precedex turned off and catapres started 3/11  - Continue on catapres 0.3 TID (3/16 - )     # Insomnia   - Patient worked night shift x 15 years   - Trouble sleeping at night leading to day time sleepiness and poor participation with PT  - Continue on seroquel 25 QHS (increased 3/18)   - Continue on melatonin   - Continue on neurontin as below    # BL LE neuropathy   - CIPN concern   - Continue on neurontin 100 BID and 200 QHS  - Monitor pain    CARDIOVASCULAR  # HTN   - HTN noted in ICU requiring cardene and esmolol GTTs   - Lisinopril dc'ed to increase catapres   - Continue on norvasc 10 and catapres 0.3 TID   - Monitor BP     # AFIB   - Hx of pAFIB   - No RVR episodes or pAFIB episodes in ICU   - No rate control medications needed  - Monitor on telemetry     # RV dilation second to PHTN   - POCUS on arrival to [facility] with RVE concern   - TTE 10/2024 with EF 55-60 with normal LVSF, moderate LVH and normal RVSF and size with no concern for pHTN   - TTE on 2/25 at  with EF 61 and normal LVSF, but enlarged RV size with reduced RVSF, mild TR, mild PHTN with PASP 49, and dilated IVC to 3.4cn, but normal TAPSE 2.1.  - Continue on aggressive diuresis in ICU    - TTE 3/11 with RVSF reduced with TAPSE 1.6. IVC improved to 2.12cm.   - Continue on lasix 40 PO BID   - Monitor IO    RESPIRATORY  # ARDS second to MFPNA   - Hx of BEA on CPAP presented to  post URI with SOB and found with AHRF with progression to ARDS second to post viral MFPNA   - Intubated 2/25   - Cannulated for vvECMO 2/26   - s/p 60XLTCP tracheostomy 3/7   - Decannulated 3/7   - CTSx removed tracheostomy and ECMO sutures on 3/17   - Continue on TC QD with PMV as able with strong phonation  - Continue on PS 18/10/40 QHS given OHS (increased 3/18)   - Eventually when able to tolerate  well will need to trial on NIV QHS with .   - Continue on nebs and HTS     GI  # Dysphagia   - s/p PEG 3/7   - Attempted green dye on 3/13 and failed  - Continue on TF  - Continue on miralax and senna  - Reattempt SS pending    # Mild transaminitis   - LFTs elevated in ICU with TBILI elevated likely from ECMO hemolysis   - US ABD with hepatic steatosis   - Trend LFTs      RENAL  # ELLA   - ELLA likely from cardiorenal with RVE   - Diuresed in ICU and improved   - Monitor renal function and UOP     # Hypernatremia   - Continue on  Q6H (decreased 3/16)    INFECTIOUS DISEASE  # Recurrent fevers with fungemia from unknown source   - Recurrent fevers post ECMO decannulation thought initially to be cytokine surge   - BCx, SCx, UCx, RVP and MRSA RPT on 3/12 negative   - Completed zosyn (3/12-3/18) empirically with improved WBC , however recurrent fevers noted.   - RPT SCx, UA, CXR and RVP negative  - TTE 3/17 with no IE  - PanCT 3/17 with PNA and persistent panniculitis   - BCx 3/15 with candida albicans.   - BCx 3/16 with yeast and pending speciation   - Concern for possible source from panniculitis vs wound care evaluation with new buttock wound with induration likely from MAD.   - Continue on caspo (3/17 - ) and pending RPT BCx 3/18   - ID following    # MFPNA and abdominal pannus cellulitis   - Presented to  post URI with SOB and found with AHRF with progression to ARDS second to post viral MFPNA   - RVP, legionella, strept, BAL, BCx, UCx, HIV, PCP, and adenovirus PCR negative   - Initially started on multiple agents in ICU and ultimatelty completed zosyn (2/26-3/9) ZMAX (2/25-2/26) and vanco (2/26-3/1)     # Penile discharge   - GC/ chlamydia negative   - HIV negative   - Monitor for now    # PPX   - MRSA PCR positive and completed bactroban (2/26-3/3)     HEME   # Anemia and thrombocytopenia   - Likely second to shearing with ECMO circuit   - Monitor HH and PLTs post decannulation and on agratroban     VASCULAR   # R IJ and BL LE DVTs   - Post ECMO dopplers on 3/9 negative for BL UE/ LE DVTs.   - Subsequent post ECMO dopplers performed on 3/14 and noted with acute, non-occlusive deep vein thrombosis noted within the right internal jugular vein. acute, occlusive deep vein thromboses noted within the right and left peroneal veins at both mid calves, and age-indeterminate, occlusive deep vein thrombosis visualized within the left gastrocnemius vein at the proximal calf.     - Continue on argatroban GTT and change to eliquis today     PODIATRY   # BL plantar feet blisters likely pressors induced  - Seen by podiatry and blisters lanced on 3/4 and again on 3/18  - Continue on local wound care per podiatry   - Podiatry following    ENDOCRINE  # Pre-DM2   - A1C 6.7  - Continue on ISS   - Monitor FS   - IF no demand then stop ISS     LINES/ TUBES  - R IJ and R FEM ECMO (2/26-3/7)     SKIN  # Pannus canndial intertrigo   - Continue on clotrimazole cream   - WOC appreciated     ETHICS/ GOC    - FULL CODE     DISPO - PT and PMR recc FLORENCE, however will continue to reassess for acute   40 YO M with PMHx of morbid obesity, BEA on CPAP, pAFIB (not on AC), HTN, and BL papilledema attributed to pseudotumor cerebri who presented initially to [facility] on 2/24 with SOB and BL LE edema. Found with URI outpatient with progressive SOB, and concern for noted for MFPNA on imaging. Course progressed with AHRF with worsening O2 demand, respiratory distress, secretions, and somnolence requiring intubation (difficult with success after 6 attempts) in ED and admission to [facility] MICU. While in MICU, patient noted with increasing O2 demand with no improvement despite optimal vent management. Concern noted for progressive ARDS, unable to prone given morbid obesity, and ultimately cannulated for vvECMO on 2/25 and transferred to [facility] for further management on 2/26. While at [facility], tx with diuresis and ABX, and ultimately decannulated and s/p 60XLTCP trach and PEG on 3/7. Patient ultimately transferred to [facility] on 3/11 and course complicated by recurrent high grade fevers and found with fungemia.    NEUROLOGY  # Hx of Pseudotumor Cerebri   - s/p LP in 2024 with high opening pressure  - s/p MRI 2024 with possible pituitary mass but unclear because of pressure effect from pseudotumor cerebri causing partially empty sella.  - Prolactin elevated   - ACTH low but recieved steroids during admission   - FT4 low normal and TSH normal, but given FT4 low normal TSH should be higher   - Discuss endocrine consult for inhouse work up vs outpatient.     # Sedation   - Weaned off sedation in ICU   - Nimbex turned off 2/27   - Prop turned off 3/9   - Precedex turned off and catapres started 3/11  - Continue on catapres 0.3 TID (3/16 - )     # Insomnia   - Patient worked night shift x 15 years   - Trouble sleeping at night leading to day time sleepiness and poor participation with PT  - Continue on seroquel 25 QHS (increased 3/18)   - Continue on melatonin   - Continue on neurontin as below    # BL LE neuropathy   - CIPN concern   - Continue on neurontin 100 BID and 200 QHS  - Monitor pain    CARDIOVASCULAR  # HTN   - HTN noted in ICU requiring cardene and esmolol GTTs   - Lisinopril dc'ed to increase catapres   - Continue on norvasc 10 and catapres 0.3 TID   - Monitor BP     # AFIB   - Hx of pAFIB   - No RVR episodes or pAFIB episodes in ICU   - No rate control medications needed  - Monitor on telemetry     # RV dilation second to PHTN   - POCUS on arrival to [facility] with RVE concern   - TTE 10/2024 with EF 55-60 with normal LVSF, moderate LVH and normal RVSF and size with no concern for pHTN   - TTE on 2/25 at  with EF 61 and normal LVSF, but enlarged RV size with reduced RVSF, mild TR, mild PHTN with PASP 49, and dilated IVC to 3.4cn, but normal TAPSE 2.1.  - Continue on aggressive diuresis in ICU    - TTE 3/11 with RVSF reduced with TAPSE 1.6. IVC improved to 2.12cm.   - Continue on lasix 40 PO BID   - Monitor IO    RESPIRATORY  # ARDS second to MFPNA   - Hx of BEA on CPAP presented to  post URI with SOB and found with AHRF with progression to ARDS second to post viral MFPNA   - Intubated 2/25   - Cannulated for vvECMO 2/26   - s/p 60XLTCP tracheostomy 3/7   - Decannulated 3/7   - CTSx removed tracheostomy and ECMO sutures on 3/17   - Continue on TC QD with PMV as able with strong phonation  - Trialing  today with 5L NC and able to tolerate well   - Continue on PS 18/10/40 QHS given OHS   - Continue on nebs and HTS     GI  # Dysphagia   - s/p PEG 3/7   - Attempted green dye on 3/13 and failed  - Continue on TF  - Continue on miralax and senna  - RPT green dye passed 3/19 and pending FEEST    # Mild transaminitis   - LFTs elevated in ICU with TBILI elevated likely from ECMO hemolysis   - US ABD with hepatic steatosis   - Trend LFTs      RENAL  # ELLA   - ELLA likely from cardiorenal with RVE   - Diuresed in ICU and improved   - Monitor renal function and UOP     # Hypernatremia   - Hypernatremia with fever spikes   - Fever curve improved and attempting to wean FWF   - Continue on  Q6H (decreased 3/19)  - May need to decrease lasix as above if hypernatremia returns    INFECTIOUS DISEASE  # Recurrent fevers with fungemia from unknown source   - Recurrent fevers post ECMO decannulation thought initially to be cytokine surge   - BCx, SCx, UCx, RVP and MRSA RPT on 3/12 negative   - Completed zosyn (3/12-3/18) empirically with improved WBC , however recurrent fevers noted.   - RPT SCx, UA, CXR and RVP negative  - TTE 3/17 with no IE  - PanCT 3/17 with PNA and persistent panniculitis   - BCx 3/15 and 3/16 with candida albicans.   - Concern for possible source from panniculitis   - Continue on caspo (3/17 - ) and pending RPT BCx 3/18   - ID following    # MFPNA and abdominal pannus cellulitis   - Presented to  post URI with SOB and found with AHRF with progression to ARDS second to post viral MFPNA   - RVP, legionella, strept, BAL, BCx, UCx, HIV, PCP, and adenovirus PCR negative   - Initially started on multiple agents in ICU and ultimatelty completed zosyn (2/26-3/9) ZMAX (2/25-2/26) and vanco (2/26-3/1)     # Penile discharge   - GC/ chlamydia negative   - HIV negative   - Monitor for now    # PPX   - MRSA PCR positive and completed bactroban (2/26-3/3)     HEME   # Anemia likely second to ECMO circuit vs AOCD   - HH low in ICU second to ECMO circuit   - HH dropping again today however no bleeding noted   - Microcytic and hemochromic and pending anemia panel   - Trend HH     VASCULAR   # R IJ and BL LE DVTs   - Post ECMO dopplers on 3/9 negative for BL UE/ LE DVTs.   - Subsequent post ECMO dopplers performed on 3/14 and noted with acute, non-occlusive deep vein thrombosis noted within the right internal jugular vein. acute, occlusive deep vein thromboses noted within the right and left peroneal veins at both mid calves, and age-indeterminate, occlusive deep vein thrombosis visualized within the left gastrocnemius vein at the proximal calf.     - Continue on argatroban GTT and change to eliquis     PODIATRY   # BL plantar feet blisters likely pressors induced  - Seen by podiatry and blisters lanced on 3/4 and again on 3/18  - Continue on local wound care per podiatry   - Podiatry following    ENDOCRINE  # Pre-DM2   - A1C 6.7  - Continue on ISS   - Monitor FS   - IF no demand then stop ISS     LINES/ TUBES  - R IJ and R FEM ECMO (2/26-3/7)     SKIN  # Pannus canndial intertrigo   # Sacrum/ buttock MAD  - Continue on clotrimazole cream   - WOC appreciated     ETHICS/ GOC    - FULL CODE     DISPO - PT and PMR recc FLORENCE, however will continue to reassess for acute

## 2025-03-19 NOTE — PROGRESS NOTE ADULT - SUBJECTIVE AND OBJECTIVE BOX
Infectious Diseases Follow Up:    Patient is a 39y old  Male who presents with a chief complaint of sob (02 Mar 2025 06:19)      Interval History/ROS:  Fever curve improving, pt w/ burning b/l LE pain, worse at night, improved w/ pain meds     Allergies  No Known Allergies        ANTIMICROBIALS:  caspofungin IVPB 70 every 24 hours      Current Abx:     Previous Abx     OTHER MEDS:  MEDICATIONS  (STANDING):  acetaminophen   Oral Liquid .. 650 every 6 hours PRN  albuterol/ipratropium for Nebulization 3 every 6 hours  amLODIPine   Tablet 10 daily  apixaban 5 every 12 hours  cloNIDine 0.3 every 8 hours  dextrose 50% Injectable 25 once  dextrose 50% Injectable 12.5 once  dextrose 50% Injectable 25 once  furosemide Solution 40 every 12 hours  gabapentin 200 at bedtime  gabapentin Solution 100 two times a day  glucagon  Injectable 1 once  HYDROmorphone  Injectable 0.2 every 6 hours PRN  insulin lispro (ADMELOG) corrective regimen sliding scale  every 6 hours  melatonin 3 at bedtime  pantoprazole   Suspension 40 daily  polyethylene glycol 3350 17 daily  QUEtiapine 25 at bedtime  senna Syrup 5 at bedtime      Vital Signs Last 24 Hrs  T(C): 37.5 (19 Mar 2025 08:00), Max: 38.2 (18 Mar 2025 20:00)  T(F): 99.5 (19 Mar 2025 08:00), Max: 100.7 (18 Mar 2025 20:00)  HR: 119 (19 Mar 2025 08:00) (111 - 130)  BP: 118/64 (19 Mar 2025 08:00) (118/64 - 139/71)  BP(mean): 84 (19 Mar 2025 04:00) (84 - 91)  RR: 26 (19 Mar 2025 08:00) (16 - 26)  SpO2: 95% (19 Mar 2025 08:00) (93% - 100%)    Parameters below as of 19 Mar 2025 08:00  Patient On (Oxygen Delivery Method): tracheostomy collar  O2 Flow (L/min): 10  O2 Concentration (%): 50    PHYSICAL EXAM:  GENERAL: NAD  HEAD:  Atraumatic, Normocephalic  EYES: EOMI, conjunctiva and sclera clear  NECK: +trach with speaking valve   CHEST/LUNG: Coarse breath sounds  HEART: RRR  ABDOMEN: Soft, Nontender, Nondistended; some thickening/discoloration, lower abdomen.   EXTREMITIES:  No central lines, PIV well appearing, LE w/o lesions    PSYCH: AAOx3                          7.1    9.09  )-----------( 244      ( 19 Mar 2025 06:25 )             25.4       03-19    140  |  98  |  10  ----------------------------<  135[H]  3.7   |  35[H]  |  0.75    Ca    8.4      19 Mar 2025 06:25  Phos  3.9     03-19  Mg     1.90     03-19        Urinalysis Basic - ( 19 Mar 2025 06:25 )    Color: x / Appearance: x / SG: x / pH: x  Gluc: 135 mg/dL / Ketone: x  / Bili: x / Urobili: x   Blood: x / Protein: x / Nitrite: x   Leuk Esterase: x / RBC: x / WBC x   Sq Epi: x / Non Sq Epi: x / Bacteria: x        MICROBIOLOGY:  v  Nares/Axilla/Groin Nares/Axilla/Groin  03-17-25   Culture in progress  --  --      Blood Blood-Venous  03-16-25   Growth in aerobic bottle: Yeast like cells  --    Growth in aerobic bottle: Yeast like cells      Blood Blood-Peripheral  03-16-25   Growth in aerobic bottle: Yeast like cells  --    Growth in aerobic bottle: Yeast like cells      Trach Asp Tracheal Aspirate  03-16-25   Commensal kyle consistent with body site  --    Few polymorphonuclear leukocytes per low power field  Rare Squamous epithelial cells per low power field  Rare Gram Positive Rods seen per oil power field  Rare Gram Negative Rods seen per oil power field      Blood Blood-Venous  03-15-25   Growth in aerobic bottle: Candida albicans  See previous culture [id number]  --    Growth in aerobic bottle: Yeast like cells      Blood Blood-Peripheral  03-15-25   Growth in aerobic bottle: Candida albicans  Direct identification is available within approximately 3-5  hours either by Blood Panel Multiplexed PCR or Direct  MALDI-TOF. Details: [email or web address]  --  Blood Culture PCR      Catheterized Catheterized  03-12-25   <10,000 CFU/mL Normal Urogenital Kyle  --  --      Blood Blood-Peripheral  03-11-25   No growth at 5 days  --  --      Blood Blood-Peripheral  03-11-25   No growth at 5 days  --  --      Trach Asp Tracheal Aspirate  03-10-25   Commensal kyle consistent with body site  --    Few polymorphonuclear leukocytes per low power field  No Squamous epithelial cells per low power field  No organisms seen per oil power field      Bronchial Bronchial Lavage  03-05-25   No growth  --    Rare polymorphonuclear leukocytes per low power field  No squamous epithelial cells per low power field  No organisms seen per oil power field      Bronchial Bronchial Lavage  03-01-25   No growth to date  --    Few polymorphonuclear leukocytes per low power field  No Squamous epithelial cells per low power field  No organisms seen per oil power field      Bronchial Bronchial Lavage  02-26-25   No growth  --    Moderate polymorphonuclear leukocytes seen per low power field  No squamous epithelial cells seen per low power field  No organisms seen per oil power field      .Blood Blood  02-26-25   No growth at 5 days  --  --      Catheterized Catheterized  02-26-25   No growth  --  --      Legionella SPUTUM  02-25-25   No Legionella species isolated  --  --      .Sputum Sputum  02-25-25   Commensal kyle consistent with body site  --    Moderate polymorphonuclear leukocytes per low power field  No Squamous epithelial cells per low power field  Rare Gram Positive Cocci in Pairs and Chains per oil power field  Rare Gram Positive Rods per oil power field      Catheterized Catheterized  02-25-25   <10,000 CFU/mL Normal Urogenital Kyle  --  --      .Blood Blood-Peripheral  02-24-25   No growth at 5 days  --  --          Rapid RVP Result: NotDetec (03-16 @ 16:35)        RADIOLOGY:  < from: CT Abdomen and Pelvis w/ IV Cont (03.17.25 @ 17:14) >  IMPRESSION:      Suboptimal exam secondary to patient's body habitus..    Patchy bilateral lower lobe alveolar opacities may reflect pneumonia as   well as atelectasis.    No gross collection is seen.    Hepatomegaly and resolved ascites.    Decreased abdominal pannus edema    Persistent skin thickening may reflect cellulitis/panniculitis.    < end of copied text >

## 2025-03-19 NOTE — PROGRESS NOTE ADULT - NS ATTEND AMEND GEN_ALL_CORE FT
40 yo M w/ class III obesity, pAfib (no AC), HTN, BEA on CPAP, history of b/l papilledema attributed to pseudotumor cerebri who was transferred from [facility] on 2/26 for VV ECMO 2/2 ARDS. TTE/ROBERT with RV failure and was diuresed. s/p treatment of pneumonia. Now s/p trach and peg. ECMO catheter decannulated 3/7. 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       Plan:     - ARDS s/p VV ecmo. Currently trach dependent, C/W nebs/airway clearance. wean to TC and use PMV for communication.   - OOB, ambulate as tolerated, PT  - C/W oral diuresis.   - Failed S/S eval. Will consider repeat as he becomes stronger.   - c/w TC during the day and vent at night   - c/w Seroquel 25mg QHS for insomnia   - C/W tube feeds via peg  - Patient found to be fungemic with candida albicans in 2/4 bottles from blood cx from 3/15  - Most like source is the patient's panus, which on CT showed panniculitis  - wound care team consulted   - TTE negative for vegtation   - c/w Caspo   - Zosyn to finish today   - f/u repeat blood cx   - Appreciate ID input   - DVT noted on repeat duplex of the right IJ and bilateral peroneal veins. C/W full ac on Eliquis   - Hx of afib, [id number] of 2. c/w full AC  - Dispo- full code. PT. OOB to chair.

## 2025-03-19 NOTE — CONSULT NOTE ADULT - ASSESSMENT
Assessment/Plan:    Wound Consult requested to assist w/ management of    FULL NOTE TO FOLLOW       Continue turning and positioning w/ offloading assistive devices as per protocol  Continue w/ Pericare as per protocol  Waffle Cushion to chair if oob to chair, limit seating time to less than 2 consecutive hours   Continue w/ bariatric low air loss fluidized bed surface     Additional findings   Right forearm near AC area with localized erythema (does not appear to be spreading) and induration, per discussion with nurse patient received bloodstick in this area yesterday.   .Management per primary team    Care as per medicine, will follow w/ you periodically during hopsital stay. please reconsult earlier if needed     Upon discharge f/u as outpatient at [facility] [address] [place], [place] [phone]  Seen w/ wound care attng today Sathish estes and D/w team MAX Clarke     Thank you for this consult  SVETLANA Pop-BC, WILLIAMS (pager #[phone] or available in MS teams)    If after 4PM or before 7:30AM on Mon-Friday or weekend/holiday please contact general surgery for urgent matters.   Team A- [id number]   Team B- [id number]  For non-urgent matters e-mail [email or web address]    I  spent (50min) minutes face to face with this patient of which more than 50% of the time was spent counseling & coordinating care of this pt Assessment/Plan:    Wound Consult requested to assist w/ management of    FULL NOTE TO FOLLOW       Continue turning and positioning w/ offloading assistive devices as per protocol  Continue w/ Pericare as per protocol  If available use bariatric seat cushion that exceeds 350 lbs Waffle Cushion to chair if oob to chair, limit seating time to less than 2 consecutive hours   Continue w/ bariatric low air loss fluidized bed surface     Additional findings   Right forearm near AC area with localized erythema (does not appear to be spreading) and induration, per discussion with nurse patient received bloodstick in this area yesterday.   .Management per primary team    Care as per medicine, will follow w/ you periodically during hopsital stay. please reconsult earlier if needed     Upon discharge f/u as outpatient at [facility] [address] [place], [place] [phone]  Seen w/ wound care attng today Sathish estes and D/w team MAX Clarke     Thank you for this consult  Milly Baron, SVETLANA-BC, WILLIAMS (pager #[phone] or available in MS teams)    If after 4PM or before 7:30AM on Mon-Friday or weekend/holiday please contact general surgery for urgent matters.   Team A- [id number]   Team B- [id number]  For non-urgent matters e-mail [email or web address]    I  spent (50min) minutes face to face with this patient of which more than 50% of the time was spent counseling & coordinating care of this pt Assessment/Plan: This is a 40 y/o M w/ PMHx AF (not on AC), HTN, BEA, pseudotumor cerebri s/p LP in 2024, initially presented to [facility] on 2/24, SOB, LE edema with URI symptoms and sick contacts, noted to have severe ARDS, intubated however still hypoxia, cannulated for VV ECMO on 2/25, transferred to [facility] on 2/25, started on empiric Vanco, Zosyn for multifocal PNA, abdominal wall cellulitis, s/p trach placement by CT surgery on 3/7, ECMO decannulated on 3/7. Zosyn held on 3/9. C/b fevers on 3/10, restarted on Zosyn, transferred to [facility] on 3/13, Bcx now w/ yeast. ID following.     Wound Consult requested to assist w/ management of Sacrum to gluteal cleft and bilateral buttocks full thickness wound present on admission. Patient seen multiple times by wound care nursing team on 3/3 noted right buttock deep tissue pressure injury complicated by moisture and friction and incontinence, on follow up visit on 3/18, a evolved deep tissue injury on sacralgluteal cleft extending to bilateral buttocks was noted, TRIAD barrier paste/hydrophilic dressing was recommended.     -Sacralgluteal cleft extending to b/l buttock evolved deep tissue pressure injury complicated by moisture (persistent high fevers), incontinence and friction, tissue type now with fibrinous slough mostly over gluteal cleft area, predominantly with friable pink moist dermis. Area of induration over areas of ulcerations to right buttock, no crepitus, no fluctuance, no tenderness. Monitor for tissue type changes or signs of infection and reconsult earlier if needed   -No s/s of acute infection in todays assessment   -Agree with TRIAD barrier paste to manage moisture and hydrophilic debridement   -Continue to offload with large Z fluidized positioning devices   -Use single breathable pad for moisture control   -Continue to collaborate with Safe Patient Handling when transfer patient etc       Topical recommendations   - Tracheostomy:  Cleanse around trach site with NS. Pat dry. Apply Silicone foam dressing without border beneath trach collar, cut mid dressing to "Y" shape. Change foam dressing every shift and prn. Change trach ties every 3 days.   - PEG with Peritubular Skin: Cleanse q shift with NS, apply liquid barrier film beneath matt disc.  If redness noted under matt disc bumper apply thin foam  dressing without border (Mepilex Lite)- cut to mid dressing with a 'Y' shape. Secondary securement to abdomen taping in 'H' fashion with Steri-strips.  Wound Care   .Sacrum, gluteal cleft, bilateral buttocks: Cleanse with skin cleanser (may use clear peribottle, fill with skin cleanser to irrigate wound), pat dry. Apply TRIAD barrier paste/hydrophilic dressing twice a day and PRN with incontinent episodes. With episodes of incontinence only remove soiled layer of Triad, then reinforce with thin layer. Monitor for tissue type changes in right buttock   .If BARIATRIC seat cushion available (holds over 350 lbs)  use of seat cushion when out of bed to the chair, linit seating time to less than 2 consecutive hours at once   .Bilateral plantar feet unroof blisters-Management per primary team   .Bilateral abdominal pannus, bilateral groin: After cleansing, apply Interdry textile sheeting, under intertriginous folds leaving 2 inches exposed at ends to wick, remove to wash & dry affected area, then replace. Individual sheeting may be used for up to 5 days unless soiled. Inspect skin daily.   .Recommend continue use of large Z fluidized positioning device to elevate heels (Ochoa Lock)   .Continue turning and positioning w/ offloading assistive devices as per protocol  .Continue w/ Pericare as per protocol  .If available use bariatric seat cushion that exceeds 350 lbs Waffle Cushion to chair if oob to chair, limit seating time to less than 2 consecutive hours   Continue w/ bariatric low air loss fluidized bed surface     Additional findings   Right forearm near AC area with localized erythema (does not appear to be spreading) and induration, per discussion with nurse patient received bloodstick in this area yesterday.   .Management per primary team    Additional recommendation   -Per records patient with     Care as per medicine, will follow w/ you periodically during hopsital stay. please reconsult earlier if needed     Upon discharge f/u as outpatient at [facility] [address] [facility] [phone]  Seen w/ wound care attng today Sathish estes and D/w team MAX Clarke   Discussed findings with primary RN     Thank you for this consult  SVETLANA Pop-BC, CWCN (pager #[phone] or available in MS teams)    If after 4PM or before 7:30AM on Mon-Friday or weekend/holiday please contact general surgery for urgent matters.   Team A- [id number]   Team B- [id number]  For non-urgent matters e-mail [email or web address]    We spent 75  minutes face to face with this patient of which more than 50% of the time was spent counseling & coordinating care of this pt

## 2025-03-19 NOTE — CONSULT NOTE ADULT - CONSULT REASON
Sacrum to gluteal cleft full thickness wound present on admission Sacrum to gluteal cleft and bilateral buttocks full thickness wound present on admission

## 2025-03-20 LAB
ANION GAP SERPL CALC-SCNC: 13 MMOL/L — SIGNIFICANT CHANGE UP (ref 7–14)
BASOPHILS # BLD AUTO: 0.02 K/UL — SIGNIFICANT CHANGE UP (ref 0–0.2)
BASOPHILS NFR BLD AUTO: 0.2 % — SIGNIFICANT CHANGE UP (ref 0–2)
BLOOD GAS VENOUS COMPREHENSIVE RESULT: SIGNIFICANT CHANGE UP
BUN SERPL-MCNC: 10 MG/DL — SIGNIFICANT CHANGE UP (ref 7–23)
CALCIUM SERPL-MCNC: 8.8 MG/DL — SIGNIFICANT CHANGE UP (ref 8.4–10.5)
CHLORIDE SERPL-SCNC: 95 MMOL/L — LOW (ref 98–107)
CO2 SERPL-SCNC: 31 MMOL/L — SIGNIFICANT CHANGE UP (ref 22–31)
CREAT SERPL-MCNC: 0.68 MG/DL — SIGNIFICANT CHANGE UP (ref 0.5–1.3)
CULTURE RESULTS: SIGNIFICANT CHANGE UP
EGFR: 123 ML/MIN/1.73M2 — SIGNIFICANT CHANGE UP
EGFR: 123 ML/MIN/1.73M2 — SIGNIFICANT CHANGE UP
EOSINOPHIL # BLD AUTO: 0.3 K/UL — SIGNIFICANT CHANGE UP (ref 0–0.5)
EOSINOPHIL NFR BLD AUTO: 2.9 % — SIGNIFICANT CHANGE UP (ref 0–6)
FOLATE SERPL-MCNC: 3.1 NG/ML — SIGNIFICANT CHANGE UP (ref 3.1–17.5)
GLUCOSE BLDC GLUCOMTR-MCNC: 122 MG/DL — HIGH (ref 70–99)
GLUCOSE BLDC GLUCOMTR-MCNC: 137 MG/DL — HIGH (ref 70–99)
GLUCOSE BLDC GLUCOMTR-MCNC: 141 MG/DL — HIGH (ref 70–99)
GLUCOSE BLDC GLUCOMTR-MCNC: 155 MG/DL — HIGH (ref 70–99)
GLUCOSE SERPL-MCNC: 138 MG/DL — HIGH (ref 70–99)
HCT VFR BLD CALC: 26.2 % — LOW (ref 39–50)
HGB BLD-MCNC: 7.3 G/DL — LOW (ref 13–17)
IANC: 5.89 K/UL — SIGNIFICANT CHANGE UP (ref 1.8–7.4)
IMM GRANULOCYTES NFR BLD AUTO: 0.7 % — SIGNIFICANT CHANGE UP (ref 0–0.9)
LYMPHOCYTES # BLD AUTO: 29.6 % — SIGNIFICANT CHANGE UP (ref 13–44)
LYMPHOCYTES # BLD AUTO: 3.02 K/UL — SIGNIFICANT CHANGE UP (ref 1–3.3)
MAGNESIUM SERPL-MCNC: 2 MG/DL — SIGNIFICANT CHANGE UP (ref 1.6–2.6)
MCHC RBC-ENTMCNC: 23.9 PG — LOW (ref 27–34)
MCHC RBC-ENTMCNC: 27.9 G/DL — LOW (ref 32–36)
MCV RBC AUTO: 85.9 FL — SIGNIFICANT CHANGE UP (ref 80–100)
MONOCYTES # BLD AUTO: 0.91 K/UL — HIGH (ref 0–0.9)
MONOCYTES NFR BLD AUTO: 8.9 % — SIGNIFICANT CHANGE UP (ref 2–14)
NEUTROPHILS # BLD AUTO: 5.89 K/UL — SIGNIFICANT CHANGE UP (ref 1.8–7.4)
NEUTROPHILS NFR BLD AUTO: 57.7 % — SIGNIFICANT CHANGE UP (ref 43–77)
NRBC # BLD AUTO: 0 K/UL — SIGNIFICANT CHANGE UP (ref 0–0)
NRBC # FLD: 0 K/UL — SIGNIFICANT CHANGE UP (ref 0–0)
NRBC BLD AUTO-RTO: 0 /100 WBCS — SIGNIFICANT CHANGE UP (ref 0–0)
PHOSPHATE SERPL-MCNC: 3.8 MG/DL — SIGNIFICANT CHANGE UP (ref 2.5–4.5)
PLATELET # BLD AUTO: 280 K/UL — SIGNIFICANT CHANGE UP (ref 150–400)
POTASSIUM SERPL-MCNC: 4.1 MMOL/L — SIGNIFICANT CHANGE UP (ref 3.5–5.3)
POTASSIUM SERPL-SCNC: 4.1 MMOL/L — SIGNIFICANT CHANGE UP (ref 3.5–5.3)
RBC # BLD: 3.05 M/UL — LOW (ref 4.2–5.8)
RBC # FLD: 23 % — HIGH (ref 10.3–14.5)
SODIUM SERPL-SCNC: 139 MMOL/L — SIGNIFICANT CHANGE UP (ref 135–145)
SPECIMEN SOURCE: SIGNIFICANT CHANGE UP
VIT B12 SERPL-MCNC: 785 PG/ML — SIGNIFICANT CHANGE UP (ref 200–900)
WBC # BLD: 10.21 K/UL — SIGNIFICANT CHANGE UP (ref 3.8–10.5)
WBC # FLD AUTO: 10.21 K/UL — SIGNIFICANT CHANGE UP (ref 3.8–10.5)

## 2025-03-20 PROCEDURE — 99232 SBSQ HOSP IP/OBS MODERATE 35: CPT

## 2025-03-20 PROCEDURE — 92616 FEES W/LARYNGEAL SENSE TEST: CPT

## 2025-03-20 PROCEDURE — 99233 SBSQ HOSP IP/OBS HIGH 50: CPT

## 2025-03-20 PROCEDURE — G0545: CPT

## 2025-03-20 RX ORDER — HYDROMORPHONE/SOD CHLOR,ISO/PF 2 MG/10 ML
0.2 SYRINGE (ML) INJECTION EVERY 6 HOURS
Refills: 0 | Status: DISCONTINUED | OUTPATIENT
Start: 2025-03-20 | End: 2025-03-21

## 2025-03-20 RX ORDER — ACETAMINOPHEN 500 MG/5ML
1000 LIQUID (ML) ORAL ONCE
Refills: 0 | Status: COMPLETED | OUTPATIENT
Start: 2025-03-20 | End: 2025-03-20

## 2025-03-20 RX ORDER — LIDOCAINE HYDROCHLORIDE 20 MG/ML
10 JELLY TOPICAL ONCE
Refills: 0 | Status: COMPLETED | OUTPATIENT
Start: 2025-03-20 | End: 2025-03-20

## 2025-03-20 RX ORDER — HYDROMORPHONE/SOD CHLOR,ISO/PF 2 MG/10 ML
0.2 SYRINGE (ML) INJECTION ONCE
Refills: 0 | Status: DISCONTINUED | OUTPATIENT
Start: 2025-03-20 | End: 2025-03-20

## 2025-03-20 RX ORDER — INSULIN LISPRO 100 U/ML
INJECTION, SOLUTION INTRAVENOUS; SUBCUTANEOUS
Refills: 0 | Status: DISCONTINUED | OUTPATIENT
Start: 2025-03-20 | End: 2025-04-25

## 2025-03-20 RX ADMIN — Medication 5 MILLILITER(S): at 21:01

## 2025-03-20 RX ADMIN — INSULIN LISPRO 1: 100 INJECTION, SOLUTION INTRAVENOUS; SUBCUTANEOUS at 17:26

## 2025-03-20 RX ADMIN — Medication 3 MILLIGRAM(S): at 21:00

## 2025-03-20 RX ADMIN — Medication 3 MILLIGRAM(S): at 00:02

## 2025-03-20 RX ADMIN — Medication 0.3 MILLIGRAM(S): at 21:00

## 2025-03-20 RX ADMIN — CLOTRIMAZOLE 1 APPLICATION(S): 1 CREAM TOPICAL at 17:17

## 2025-03-20 RX ADMIN — Medication 0.2 MILLIGRAM(S): at 09:48

## 2025-03-20 RX ADMIN — IPRATROPIUM BROMIDE AND ALBUTEROL SULFATE 3 MILLILITER(S): .5; 2.5 SOLUTION RESPIRATORY (INHALATION) at 14:26

## 2025-03-20 RX ADMIN — Medication 0.2 MILLIGRAM(S): at 02:58

## 2025-03-20 RX ADMIN — LIDOCAINE HYDROCHLORIDE 10 MILLILITER(S): 20 JELLY TOPICAL at 13:40

## 2025-03-20 RX ADMIN — QUETIAPINE FUMARATE 25 MILLIGRAM(S): 25 TABLET ORAL at 00:02

## 2025-03-20 RX ADMIN — Medication 15 MILLILITER(S): at 11:15

## 2025-03-20 RX ADMIN — Medication 15 MILLILITER(S): at 17:17

## 2025-03-20 RX ADMIN — GABAPENTIN 100 MILLIGRAM(S): 400 CAPSULE ORAL at 17:18

## 2025-03-20 RX ADMIN — GABAPENTIN 100 MILLIGRAM(S): 400 CAPSULE ORAL at 06:10

## 2025-03-20 RX ADMIN — FUROSEMIDE 40 MILLIGRAM(S): 10 INJECTION INTRAMUSCULAR; INTRAVENOUS at 17:18

## 2025-03-20 RX ADMIN — Medication 40 MILLIGRAM(S): at 11:15

## 2025-03-20 RX ADMIN — Medication 5 MILLILITER(S): at 00:02

## 2025-03-20 RX ADMIN — Medication 400 MILLIGRAM(S): at 23:26

## 2025-03-20 RX ADMIN — Medication 650 MILLIGRAM(S): at 02:58

## 2025-03-20 RX ADMIN — AMLODIPINE BESYLATE 10 MILLIGRAM(S): 10 TABLET ORAL at 06:11

## 2025-03-20 RX ADMIN — Medication 0.3 MILLIGRAM(S): at 00:03

## 2025-03-20 RX ADMIN — Medication 0.3 MILLIGRAM(S): at 06:11

## 2025-03-20 RX ADMIN — Medication 1 APPLICATION(S): at 06:13

## 2025-03-20 RX ADMIN — Medication 15 MILLILITER(S): at 06:14

## 2025-03-20 RX ADMIN — Medication 0.2 MILLIGRAM(S): at 20:53

## 2025-03-20 RX ADMIN — Medication 0.2 MILLIGRAM(S): at 09:18

## 2025-03-20 RX ADMIN — IPRATROPIUM BROMIDE AND ALBUTEROL SULFATE 3 MILLILITER(S): .5; 2.5 SOLUTION RESPIRATORY (INHALATION) at 19:19

## 2025-03-20 RX ADMIN — IPRATROPIUM BROMIDE AND ALBUTEROL SULFATE 3 MILLILITER(S): .5; 2.5 SOLUTION RESPIRATORY (INHALATION) at 03:50

## 2025-03-20 RX ADMIN — GABAPENTIN 200 MILLIGRAM(S): 400 CAPSULE ORAL at 21:01

## 2025-03-20 RX ADMIN — IPRATROPIUM BROMIDE AND ALBUTEROL SULFATE 3 MILLILITER(S): .5; 2.5 SOLUTION RESPIRATORY (INHALATION) at 08:43

## 2025-03-20 RX ADMIN — APIXABAN 5 MILLIGRAM(S): 2.5 TABLET, FILM COATED ORAL at 06:11

## 2025-03-20 RX ADMIN — Medication 0.2 MILLIGRAM(S): at 21:38

## 2025-03-20 RX ADMIN — Medication 650 MILLIGRAM(S): at 16:09

## 2025-03-20 RX ADMIN — FUROSEMIDE 40 MILLIGRAM(S): 10 INJECTION INTRAMUSCULAR; INTRAVENOUS at 06:11

## 2025-03-20 RX ADMIN — QUETIAPINE FUMARATE 25 MILLIGRAM(S): 25 TABLET ORAL at 21:00

## 2025-03-20 RX ADMIN — CASPOFUNGIN ACETATE 260 MILLIGRAM(S): 5 INJECTION, POWDER, LYOPHILIZED, FOR SOLUTION INTRAVENOUS at 03:03

## 2025-03-20 RX ADMIN — APIXABAN 5 MILLIGRAM(S): 2.5 TABLET, FILM COATED ORAL at 17:18

## 2025-03-20 RX ADMIN — Medication 0.3 MILLIGRAM(S): at 13:44

## 2025-03-20 NOTE — PROGRESS NOTE ADULT - SUBJECTIVE AND OBJECTIVE BOX
CHIEF COMPLAINT: Patient is a 39y old  Male who presents with a chief complaint of sob (02 Mar 2025 06:19)      INTERVAL EVENTS:     ROS: Seen by bedside during AM rounds     OBJECTIVE:  ICU Vital Signs Last 24 Hrs  T(C): 36.9 (20 Mar 2025 06:09), Max: 37.9 (19 Mar 2025 12:15)  T(F): 98.5 (20 Mar 2025 06:09), Max: 100.2 (19 Mar 2025 12:15)  HR: 120 (20 Mar 2025 06:09) (112 - 120)  BP: 126/65 (20 Mar 2025 06:09) (114/66 - 151/99)  BP(mean): --  ABP: --  ABP(mean): --  RR: 18 (20 Mar 2025 06:09) (17 - 32)  SpO2: 97% (20 Mar 2025 06:56) (92% - 100%)    O2 Parameters below as of 20 Mar 2025 06:09  Patient On (Oxygen Delivery Method): ventilator, cpqp mode          Mode: CPAP with PS, FiO2: 40, PEEP: 10, PS: 18, MAP: 15, PIP: 26    03-19 @ 07:01  -  03-20 @ 07:00  --------------------------------------------------------  IN: 2890 mL / OUT: 1500 mL / NET: 1390 mL      CAPILLARY BLOOD GLUCOSE      POCT Blood Glucose.: 137 mg/dL (20 Mar 2025 05:35)    PHYSICAL EXAMINATION  General: NAD and morbidly obese   HEENT: Trach present and able to tolerate PMV well with strong cough and able to cough up clear secretions into mouth   Cards: S1/S2, no murmurs   Pulm: CTA bilaterally. No wheezes.   Abdomen: Soft, nondistended and nontender. PEG (+)   Extremities: No pedal edema. ISI of BL upper and lower extremities with some weakness.  Neurology: Awake and alert with no acute focal neurological deficits       Mode: CPAP with PS  FiO2: 40  PEEP: 10  PS: 18  MAP: 15  PIP: 26      HOSPITAL MEDICATIONS:  MEDICATIONS  (STANDING):  albuterol/ipratropium for Nebulization 3 milliLiter(s) Nebulizer every 6 hours  amLODIPine   Tablet 10 milliGRAM(s) Oral daily  apixaban 5 milliGRAM(s) Oral every 12 hours  caspofungin IVPB 70 milliGRAM(s) IV Intermittent every 24 hours  chlorhexidine 0.12% Liquid 15 milliLiter(s) Swish and Spit two times a day  chlorhexidine 2% Cloths 1 Application(s) Topical <User Schedule>  cloNIDine 0.3 milliGRAM(s) Oral every 8 hours  clotrimazole 1% Cream 1 Application(s) Topical two times a day  dextrose 50% Injectable 25 Gram(s) IV Push once  dextrose 50% Injectable 12.5 Gram(s) IV Push once  dextrose 50% Injectable 25 Gram(s) IV Push once  furosemide Solution 40 milliGRAM(s) Oral every 12 hours  gabapentin 200 milliGRAM(s) Oral at bedtime  gabapentin Solution 100 milliGRAM(s) Oral two times a day  glucagon  Injectable 1 milliGRAM(s) IntraMuscular once  insulin lispro (ADMELOG) corrective regimen sliding scale   SubCutaneous every 6 hours  melatonin 3 milliGRAM(s) Oral at bedtime  multivitamin/minerals/iron Oral Solution (CENTRUM) 15 milliLiter(s) Oral daily  pantoprazole   Suspension 40 milliGRAM(s) Oral daily  polyethylene glycol 3350 17 Gram(s) Oral daily  QUEtiapine 25 milliGRAM(s) Oral at bedtime  senna Syrup 5 milliLiter(s) Oral at bedtime    MEDICATIONS  (PRN):  acetaminophen   Oral Liquid .. 650 milliGRAM(s) Oral every 6 hours PRN Temp greater or equal to 38C (100.4F), Mild Pain (1 - 3), Moderate Pain (4 - 6)  HYDROmorphone  Injectable 0.2 milliGRAM(s) IV Push every 6 hours PRN Severe Pain (7 - 10)      LABS:                        7.4    9.65  )-----------( 266      ( 19 Mar 2025 16:17 )             26.7     03-19    140  |  98  |  10  ----------------------------<  135[H]  3.7   |  35[H]  |  0.75    Ca    8.4      19 Mar 2025 06:25  Phos  3.9     03-19  Mg     1.90     03-19        Urinalysis Basic - ( 19 Mar 2025 06:25 )    Color: x / Appearance: x / SG: x / pH: x  Gluc: 135 mg/dL / Ketone: x  / Bili: x / Urobili: x   Blood: x / Protein: x / Nitrite: x   Leuk Esterase: x / RBC: x / WBC x   Sq Epi: x / Non Sq Epi: x / Bacteria: x        Venous Blood Gas:  03-20 @ 06:00  7.35/75/184/41/100.0  VBG Lactate: 1.3  Venous Blood Gas:  03-19 @ 16:17  7.34/75/43/40/74.4  VBG Lactate: 0.8  Venous Blood Gas:  03-19 @ 06:25  7.33/76/28/40/42.5  VBG Lactate: 1.1   CHIEF COMPLAINT: Patient is a 39y old  Male who presents with a chief complaint of sob (02 Mar 2025 06:19)      INTERVAL EVENTS: No overnight events     ROS: Seen by bedside during AM rounds     OBJECTIVE:  ICU Vital Signs Last 24 Hrs  T(C): 36.9 (20 Mar 2025 06:09), Max: 37.9 (19 Mar 2025 12:15)  T(F): 98.5 (20 Mar 2025 06:09), Max: 100.2 (19 Mar 2025 12:15)  HR: 120 (20 Mar 2025 06:09) (112 - 120)  BP: 126/65 (20 Mar 2025 06:09) (114/66 - 151/99)  BP(mean): --  ABP: --  ABP(mean): --  RR: 18 (20 Mar 2025 06:09) (17 - 32)  SpO2: 97% (20 Mar 2025 06:56) (92% - 100%)    O2 Parameters below as of 20 Mar 2025 06:09  Patient On (Oxygen Delivery Method): ventilator, cpqp mode          Mode: CPAP with PS, FiO2: 40, PEEP: 10, PS: 18, MAP: 15, PIP: 26    03-19 @ 07:01  -  03-20 @ 07:00  --------------------------------------------------------  IN: 2890 mL / OUT: 1500 mL / NET: 1390 mL      CAPILLARY BLOOD GLUCOSE      POCT Blood Glucose.: 137 mg/dL (20 Mar 2025 05:35)    PHYSICAL EXAMINATION  General: NAD and morbidly obese   HEENT: Trach present and able to tolerate PMV well with strong cough and able to cough up clear secretions into mouth   Cards: S1/S2, no murmurs   Pulm: CTA bilaterally. No wheezes.   Abdomen: Soft, nondistended and nontender. PEG (+)   Extremities: No pedal edema. ISI of BL upper and lower extremities with some weakness. Dry dsg on bilat feet   Neurology: Awake and alert with no acute focal neurological deficits       Mode: CPAP with PS  FiO2: 40  PEEP: 10  PS: 18  MAP: 15  PIP: 26      HOSPITAL MEDICATIONS:  MEDICATIONS  (STANDING):  albuterol/ipratropium for Nebulization 3 milliLiter(s) Nebulizer every 6 hours  amLODIPine   Tablet 10 milliGRAM(s) Oral daily  apixaban 5 milliGRAM(s) Oral every 12 hours  caspofungin IVPB 70 milliGRAM(s) IV Intermittent every 24 hours  chlorhexidine 0.12% Liquid 15 milliLiter(s) Swish and Spit two times a day  chlorhexidine 2% Cloths 1 Application(s) Topical <User Schedule>  cloNIDine 0.3 milliGRAM(s) Oral every 8 hours  clotrimazole 1% Cream 1 Application(s) Topical two times a day  dextrose 50% Injectable 25 Gram(s) IV Push once  dextrose 50% Injectable 12.5 Gram(s) IV Push once  dextrose 50% Injectable 25 Gram(s) IV Push once  furosemide Solution 40 milliGRAM(s) Oral every 12 hours  gabapentin 200 milliGRAM(s) Oral at bedtime  gabapentin Solution 100 milliGRAM(s) Oral two times a day  glucagon  Injectable 1 milliGRAM(s) IntraMuscular once  insulin lispro (ADMELOG) corrective regimen sliding scale   SubCutaneous every 6 hours  melatonin 3 milliGRAM(s) Oral at bedtime  multivitamin/minerals/iron Oral Solution (CENTRUM) 15 milliLiter(s) Oral daily  pantoprazole   Suspension 40 milliGRAM(s) Oral daily  polyethylene glycol 3350 17 Gram(s) Oral daily  QUEtiapine 25 milliGRAM(s) Oral at bedtime  senna Syrup 5 milliLiter(s) Oral at bedtime    MEDICATIONS  (PRN):  acetaminophen   Oral Liquid .. 650 milliGRAM(s) Oral every 6 hours PRN Temp greater or equal to 38C (100.4F), Mild Pain (1 - 3), Moderate Pain (4 - 6)  HYDROmorphone  Injectable 0.2 milliGRAM(s) IV Push every 6 hours PRN Severe Pain (7 - 10)      LABS:                        7.4    9.65  )-----------( 266      ( 19 Mar 2025 16:17 )             26.7     03-19    140  |  98  |  10  ----------------------------<  135[H]  3.7   |  35[H]  |  0.75    Ca    8.4      19 Mar 2025 06:25  Phos  3.9     03-19  Mg     1.90     03-19        Urinalysis Basic - ( 19 Mar 2025 06:25 )    Color: x / Appearance: x / SG: x / pH: x  Gluc: 135 mg/dL / Ketone: x  / Bili: x / Urobili: x   Blood: x / Protein: x / Nitrite: x   Leuk Esterase: x / RBC: x / WBC x   Sq Epi: x / Non Sq Epi: x / Bacteria: x        Venous Blood Gas:  03-20 @ 06:00  7.35/75/184/41/100.0  VBG Lactate: 1.3  Venous Blood Gas:  03-19 @ 16:17  7.34/75/43/40/74.4  VBG Lactate: 0.8  Venous Blood Gas:  03-19 @ 06:25  7.33/76/28/40/42.5  VBG Lactate: 1.1

## 2025-03-20 NOTE — SWALLOW FEES ASSESSMENT ADULT - COMMENTS
Pulmonologist Note 3/20/2025 - 38 YO M with PMHx of morbid obesity, BEA on CPAP, pAFIB (not on AC), HTN, and BL papilledema attributed to pseudotumor cerebri who presented initially to [facility] on 2/24 with SOB and BL LE edema. Found with URI outpatient with progressive SOB, and concern for noted for MFPNA on imaging. Course progressed with AHRF with worsening O2 demand, respiratory distress, secretions, and somnolence requiring intubation (difficult with success after 6 attempts) in ED and admission to [facility] MICU. While in MICU, patient noted with increasing O2 demand with no improvement despite optimal vent management. Concern noted for progressive ARDS, unable to prone given morbid obesity, and ultimately cannulated for vvECMO on 2/25 and transferred to [facility] for further management on 2/26. While at [facility], tx with diuresis and ABX, and ultimately decannulated and s/p 60XLTCP trach and PEG on 3/7. Patient ultimately transferred to [facility] on 3/11 and course complicated by recurrent high grade fevers and found with fungemia.    Of Note: Patient was seen for a Green Dye Swallow Test on 3/13 (See Consult) and a repeat Green Dye Swallow Test on 3/19/ (See Consult). Pulmonologist Note 3/20/2025 - 38 YO M with PMHx of morbid obesity, BEA on CPAP, pAFIB (not on AC), HTN, and BL papilledema attributed to pseudotumor cerebri who presented initially to [facility] on 2/24 with SOB and BL LE edema. Found with URI outpatient with progressive SOB, and concern for noted for MFPNA on imaging. Course progressed with AHRF with worsening O2 demand, respiratory distress, secretions, and somnolence requiring intubation (difficult with success after 6 attempts) in ED and admission to [facility] MICU. While in MICU, patient noted with increasing O2 demand with no improvement despite optimal vent management. Concern noted for progressive ARDS, unable to prone given morbid obesity, and ultimately cannulated for vvECMO on 2/25 and transferred to [facility] for further management on 2/26. While at [facility], tx with diuresis and ABX, and ultimately decannulated and s/p 60XLTCP trach and PEG on 3/7. Patient ultimately transferred to [facility] on 3/11 and course complicated by recurrent high grade fevers and found with fungemia.    Of Note: Patient was seen for a Green Dye Swallow Test on 3/13 (See Consult) and a repeat Green Dye Swallow Test on 3/19/ (See Consult).    Patient seen at bedside, alert oriented able to follow commands and express needs at the conversational level. Patient is with #[id number] Proximal with One Way Speaking Valve on Trach Collar status.  Baseline Stats: SPO2 100% RR 20 Pulmonologist Note 3/20/2025 - 38 YO M with PMHx of morbid obesity, BEA on CPAP, pAFIB (not on AC), HTN, and BL papilledema attributed to pseudotumor cerebri who presented initially to [facility] on 2/24 with SOB and BL LE edema. Found with URI outpatient with progressive SOB, and concern for noted for MFPNA on imaging. Course progressed with AHRF with worsening O2 demand, respiratory distress, secretions, and somnolence requiring intubation (difficult with success after 6 attempts) in ED and admission to [facility] MICU. While in MICU, patient noted with increasing O2 demand with no improvement despite optimal vent management. Concern noted for progressive ARDS, unable to prone given morbid obesity, and ultimately cannulated for vvECMO on 2/25 and transferred to [facility] for further management on 2/26. While at [facility], tx with diuresis and ABX, and ultimately decannulated and s/p 60XLTCP trach and PEG on 3/7. Patient ultimately transferred to [facility] on 3/11 and course complicated by recurrent high grade fevers and found with fungemia.    Of Note: Patient was seen for a Green Dye Swallow Test on 3/13 (See Consult) and a repeat Green Dye Swallow Test on 3/19/ (See Consult).    Patient seen at bedside, alert oriented able to follow commands and express needs at the conversational level. Patient is with #[id number] Proximal with One Way Speaking Valve on Trach Collar status. Good quality voicing output.  Baseline Stats: SPO2 100% RR 20

## 2025-03-20 NOTE — PROGRESS NOTE ADULT - ASSESSMENT
This is a 40 y/o M w/ PMHx AF (not on AC), HTN, BEA, pseudotumor cerebri s/p LP in 2024, initially presented to [facility] on 2/24, SOB, LE edema with URI symptoms and sick contacts, noted to have severe ARDS, intubated however still hypoxia, cannulated for VV ECMO on 2/25, transferred to [facility] on 2/25, started on empiric Vanco, Zosyn for multifocal PNA, abdominal wall cellulitis, s/p trach placement by CT surgery on 3/7, ECMO decannulated on 3/7. Zosyn held on 3/9.  C/b fevers on 3/10, restarted on Zosyn, transferred to [facility] on 3/13, Bcx now w/ yeast.    #Candida albicans fungemia   #Fevers   #Multifocal PNA, abdominal wall cellulitis s/p Vanco/Zosyn  #ARDS, hypoxic respiratory failure requiring VV EMCO 2/2 multifocal PNA? s/p decannulation on 3/7    Overall, 40 y/o M w/ PMHx AF (not on AC), HTN, BEA, pseudotumor cerebri s/p LP in 2024 admitted to [facility] on 2/26 for ARDS, hypoxic respiratory failure requiring VV EMCO 2/2 multifocal PNA? s/p decannulation on 3/7, c/b abdominal wall cellulitis s/p Vancomycin/Zosyn, s/p trach on 3/7, in the setting of persistent fevers despite Zosyn, BCx now w/ yeast, Candida albicans   Unclear source for yeast in BCx, pt had all central lines removed on 3/7, not getting TPN, no abdominal pain on exam, PEG site well appearing.     CT A/P w/o clear abdominal source of fungemia, possibly 2/2 abdominal wall cellulitis? TTE w/o IE.    Recommendations;   1. Caspofungin 70 mg daily (given weight) (would avoid fluconazole for now given high dose needed)   2. Repeat BCx on 3/18 in lab   3. TTE wnl, possible ROBERT if not clearing      Thank you for consulting us and involving us in the management of this patient's case. In addition to reviewing history, imaging, documents, labs, microbiology, and infection control strategies and potential issues.     ID will continue to follow    Shailesh Owens M.D.  Attending Physician  Division of Infectious Diseases  Department of Medicine    Please contact through MS Teams message.  Office: [phone] (after 5 PM or weekend)

## 2025-03-20 NOTE — SWALLOW FEES ASSESSMENT ADULT - RECOMMENDED CONSISTENCY
1.) Soft Bite Size with Thin Liquids. Patient must wear One Way Speaking Valve with all meals.   2.) Continue with PEG Tube Feeding for nutritional support  3.) Feeding/Swallowing Guidelines: Upright position, small bites, single cup/straw sips; alternate with a liquid wash after every 2 to 3 bites  4.) Aspiration Precautions  5.) Reflux Precautions  6.) Maintain Good Oral Hygiene Care 1.) Soft Bite Size with Thin Liquids. PATIENT MUST WEAR ONE WAY SPEAKING VALVE WITH ALL MEALS.   2.) Continue with PEG Tube Feeding for nutritional support  3.) Feeding/Swallowing Guidelines: Upright position, small bites, single cup/straw sips; alternate with a liquid wash after every 2 to 3 bites  4.) Aspiration Precautions  5.) Reflux Precautions  6.) Maintain Good Oral Hygiene Care

## 2025-03-20 NOTE — SWALLOW FEES ASSESSMENT ADULT - CONSISTENCIES ADMINISTERED
x3 via teaspoon x 8 via straw/thin liquid x2/pureed x 5/mildly thick soft bite size x2; regular x2/soft & bite-sized/regular solid

## 2025-03-20 NOTE — CHART NOTE - NSCHARTNOTEFT_GEN_A_CORE
Discussed with primary team weight bearing status for patient. Patient is able To weight bear as tolerated in surgical shoe. Wound care instructions and followup information in discharge paperwork.

## 2025-03-20 NOTE — PROGRESS NOTE ADULT - NS ATTEND AMEND GEN_ALL_CORE FT
40 yo M w/ class III obesity, pAfib (no AC), HTN, BEA on CPAP, history of b/l papilledema attributed to pseudotumor cerebri who was transferred from [facility] on 2/26 for VV ECMO 2/2 ARDS. TTE/ROBERT with RV failure and was diuresed. s/p treatment of pneumonia. Now s/p trach and peg. ECMO catheter decannulated 3/7. 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       Plan:     - ARDS s/p VV ecmo. Currently trach dependent, C/W nebs/airway clearance. wean to TC and use PMV for communication. Patient did well  trail on 3/19  - OOB, /ambulate as tolerated, PT  - C/W oral diuresis.   - FEEST exam performed today, patient able to have soft and bite sized with thin liquids   - c/w TC during the day and vent at night   - c/w Seroquel 25mg QHS for insomnia   - Patient found to be fungemic with candida albicans in 2/4 bottles from blood cx from 3/15  - Most like source is the patient's panus, which on CT showed panniculitis  - wound care team consulted   - TTE negative for vegtation   - c/w Caspo   - Zosyn to finish today   - f/u repeat blood cx   - Appreciate ID input   - DVT noted on repeat duplex of the right IJ and bilateral peroneal veins. C/W full ac on Eliquis   - Hx of afib, [id number] of 2. c/w full AC  - Dispo- full code. PT. OOB to chair.

## 2025-03-20 NOTE — SWALLOW FEES ASSESSMENT ADULT - DIAGNOSTIC IMPRESSIONS
Flexible Endoscopic Evaluation Swallow (FEES) Test in collaboration with Pulmonary Team. The Ambu Scope was passed via Right nare by Pulmonary Physician. Patient with trace secretions noted in the raven/hypopharynx. Patient is given PO Trials of Puree, Solids, Mildly Thick Liquid and Thin Liquids (mixed with green food color dye). Patient is judged with Functional Oral Stage characterized by adequate oral containment, adequate chewing for solid, adequate bolus manipulation, adequate tongue motion for anterior to posterior transfer of the bolus for puree/solids; piecemeal transfer/deglutition for solids (2-4x). No visualization of premature spillage/loss to the raven/hypopharynx for thin liquids/mildly thick liquids. with adequtae oral clearance post swallow.  The Pharyngeal Stage is characterized by adequate initiation of the pharyngeal swallow. There is trace/mild pharyngeal clearance deficits for solids greater than puree located in the vallecular, pyriforms, posterior/lateral pharyngeal wall post primary swallow. A liquid wash improves pharyngeal clearance.  There was No Aspiration observed BEFORE or AFTER the swallow for puree, solids, mildly thick liquids and thin liquids.   Of Note: FEES Test was performed with One Way Speaking Valve in place. Flexible Endoscopic Evaluation Swallow (FEES) Test in collaboration with Pulmonary Team. The Ambu Scope was passed via Right nare by Pulmonary Physician. Patient with trace secretions noted in the raven/hypopharynx. Patient is given PO Trials of Puree, Soft Bite Size and Solids, Mildly Thick Liquid and Thin Liquids (mixed with green food color dye). Patient is judged with Functional Oral Stage characterized by adequate oral containment, adequate chewing for solid, adequate bolus manipulation, adequate tongue motion for anterior to posterior transfer of the bolus for puree/solids; piecemeal transfer/deglutition for solids (2-4x). No visualization of premature spillage/loss to the raven/hypopharynx for thin liquids/mildly thick liquids. with adequtae oral clearance post swallow.  The Pharyngeal Stage is characterized by adequate initiation of the pharyngeal swallow. There is trace/mild pharyngeal clearance deficits for solids greater than puree located in the vallecular, pyriforms, posterior/lateral pharyngeal wall post primary swallow. A liquid wash improves pharyngeal clearance.  There was No Aspiration observed BEFORE or AFTER the swallow for puree,, soft bite size and solids, mildly thick liquids and thin liquids.   Of Note: FEES Test was performed with One Way Speaking Valve in place. Flexible Endoscopic Evaluation Swallow (FEES) Test in collaboration with Pulmonary Team. The Ambu Scope was passed via Right nare by Pulmonary Physician. Patient with trace secretions noted in the raven/hypopharynx. Patient is given PO Trials of Puree, Soft Bite Size and Solids, Mildly Thick Liquid and Thin Liquids (mixed with green food color dye). Patient is judged with Functional Oral Stage characterized by adequate oral containment, adequate chewing for solid, adequate bolus manipulation, adequate tongue motion for anterior to posterior transfer of the bolus for puree/solids; piecemeal transfer/deglutition for solids (2-4x). No visualization of premature spillage/loss to the raven/hypopharynx for thin liquids/mildly thick liquids. with adequate oral clearance post swallow.  The Pharyngeal Stage is characterized by adequate initiation of the pharyngeal swallow. There is trace/mild pharyngeal clearance deficits for solids greater than puree located in the vallecular, pyriforms, posterior/lateral pharyngeal wall post primary swallow. A liquid wash improves pharyngeal clearance.  There was No Aspiration observed BEFORE or AFTER the swallow for puree,, soft bite size and solids, mildly thick liquids and thin liquids.   Of Note: FEES Test was performed with One Way Speaking Valve in place.

## 2025-03-20 NOTE — PROGRESS NOTE ADULT - ASSESSMENT
38 YO M with PMHx of morbid obesity, BEA on CPAP, pAFIB (not on AC), HTN, and BL papilledema attributed to pseudotumor cerebri who presented initially to [facility] on 2/24 with SOB and BL LE edema. Found with URI outpatient with progressive SOB, and concern for noted for MFPNA on imaging. Course progressed with AHRF with worsening O2 demand, respiratory distress, secretions, and somnolence requiring intubation (difficult with success after 6 attempts) in ED and admission to [facility] MICU. While in MICU, patient noted with increasing O2 demand with no improvement despite optimal vent management. Concern noted for progressive ARDS, unable to prone given morbid obesity, and ultimately cannulated for vvECMO on 2/25 and transferred to [facility] for further management on 2/26. While at [facility], tx with diuresis and ABX, and ultimately decannulated and s/p 60XLTCP trach and PEG on 3/7. Patient ultimately transferred to [facility] on 3/11 and course complicated by recurrent high grade fevers and found with fungemia.    NEUROLOGY  # Hx of Pseudotumor Cerebri   - s/p LP in 2024 with high opening pressure  - s/p MRI 2024 with possible pituitary mass but unclear because of pressure effect from pseudotumor cerebri causing partially empty sella.  - Prolactin elevated   - ACTH low but recieved steroids during admission   - FT4 low normal and TSH normal, but given FT4 low normal TSH should be higher   - Discuss endocrine consult for inhouse work up vs outpatient.     # Sedation   - Weaned off sedation in ICU   - Nimbex turned off 2/27   - Prop turned off 3/9   - Precedex turned off and catapres started 3/11  - Continue on catapres 0.3 TID (3/16 - )     # Insomnia   - Patient worked night shift x 15 years   - Trouble sleeping at night leading to day time sleepiness and poor participation with PT  - Continue on seroquel 25 QHS (increased 3/18)   - Continue on melatonin   - Continue on neurontin as below    # BL LE neuropathy   - CIPN concern   - Continue on neurontin 100 BID and 200 QHS  - Monitor pain    CARDIOVASCULAR  # HTN   - HTN noted in ICU requiring cardene and esmolol GTTs   - Lisinopril dc'ed to increase catapres   - Continue on norvasc 10 and catapres 0.3 TID   - Monitor BP     # AFIB   - Hx of pAFIB   - No RVR episodes or pAFIB episodes in ICU   - No rate control medications needed  - Monitor on telemetry     # RV dilation second to PHTN   - POCUS on arrival to [facility] with RVE concern   - TTE 10/2024 with EF 55-60 with normal LVSF, moderate LVH and normal RVSF and size with no concern for pHTN   - TTE on 2/25 at  with EF 61 and normal LVSF, but enlarged RV size with reduced RVSF, mild TR, mild PHTN with PASP 49, and dilated IVC to 3.4cn, but normal TAPSE 2.1.  - Continue on aggressive diuresis in ICU    - TTE 3/11 with RVSF reduced with TAPSE 1.6. IVC improved to 2.12cm.   - Continue on lasix 40 PO BID   - Monitor IO    RESPIRATORY  # ARDS second to MFPNA   - Hx of BEA on CPAP presented to  post URI with SOB and found with AHRF with progression to ARDS second to post viral MFPNA   - Intubated 2/25   - Cannulated for vvECMO 2/26   - s/p 60XLTCP tracheostomy 3/7   - Decannulated 3/7   - CTSx removed tracheostomy and ECMO sutures on 3/17   - Continue on TC QD with PMV as able with strong phonation  - Trialing  today with 5L NC and able to tolerate well   - Continue on PS 18/10/40 QHS given OHS   - Continue on nebs and HTS     GI  # Dysphagia   - s/p PEG 3/7   - Attempted green dye on 3/13 and failed  - Continue on TF  - Continue on miralax and senna  - RPT green dye passed 3/19 and pending FEEST    # Mild transaminitis   - LFTs elevated in ICU with TBILI elevated likely from ECMO hemolysis   - US ABD with hepatic steatosis   - Trend LFTs      RENAL  # ELLA   - ELLA likely from cardiorenal with RVE   - Diuresed in ICU and improved   - Monitor renal function and UOP     # Hypernatremia   - Hypernatremia with fever spikes   - Fever curve improved and attempting to wean FWF   - Continue on  Q6H (decreased 3/19)  - May need to decrease lasix as above if hypernatremia returns    INFECTIOUS DISEASE  # Recurrent fevers with fungemia from unknown source   - Recurrent fevers post ECMO decannulation thought initially to be cytokine surge   - BCx, SCx, UCx, RVP and MRSA RPT on 3/12 negative   - Completed zosyn (3/12-3/18) empirically with improved WBC , however recurrent fevers noted.   - RPT SCx, UA, CXR and RVP negative  - TTE 3/17 with no IE  - PanCT 3/17 with PNA and persistent panniculitis   - BCx 3/15 and 3/16 with candida albicans.   - Concern for possible source from panniculitis   - Continue on caspo (3/17 - ) and pending RPT BCx 3/18   - ID following    # MFPNA and abdominal pannus cellulitis   - Presented to  post URI with SOB and found with AHRF with progression to ARDS second to post viral MFPNA   - RVP, legionella, strept, BAL, BCx, UCx, HIV, PCP, and adenovirus PCR negative   - Initially started on multiple agents in ICU and ultimatelty completed zosyn (2/26-3/9) ZMAX (2/25-2/26) and vanco (2/26-3/1)     # Penile discharge   - GC/ chlamydia negative   - HIV negative   - Monitor for now    # PPX   - MRSA PCR positive and completed bactroban (2/26-3/3)     HEME   # Anemia likely second to ECMO circuit vs AOCD   - HH low in ICU second to ECMO circuit   - HH dropping again today however no bleeding noted   - Microcytic and hemochromic and pending anemia panel   - Trend HH     VASCULAR   # R IJ and BL LE DVTs   - Post ECMO dopplers on 3/9 negative for BL UE/ LE DVTs.   - Subsequent post ECMO dopplers performed on 3/14 and noted with acute, non-occlusive deep vein thrombosis noted within the right internal jugular vein. acute, occlusive deep vein thromboses noted within the right and left peroneal veins at both mid calves, and age-indeterminate, occlusive deep vein thrombosis visualized within the left gastrocnemius vein at the proximal calf.     - Continue on argatroban GTT and change to eliquis     PODIATRY   # BL plantar feet blisters likely pressors induced  - Seen by podiatry and blisters lanced on 3/4 and again on 3/18  - Continue on local wound care per podiatry   - Podiatry following    ENDOCRINE  # Pre-DM2   - A1C 6.7  - Continue on ISS   - Monitor FS   - IF no demand then stop ISS     LINES/ TUBES  - R IJ and R FEM ECMO (2/26-3/7)     SKIN  # Pannus canndial intertrigo   # Sacrum/ buttock MAD  - Continue on clotrimazole cream   - WOC appreciated     ETHICS/ GOC    - FULL CODE     DISPO - PT and PMR recc FLORENCE, however will continue to reassess for acute   38 YO M with PMHx of morbid obesity, BEA on CPAP, pAFIB (not on AC), HTN, and BL papilledema attributed to pseudotumor cerebri who presented initially to [facility] on 2/24 with SOB and BL LE edema. Found with URI outpatient with progressive SOB, and concern for noted for MFPNA on imaging. Course progressed with AHRF with worsening O2 demand, respiratory distress, secretions, and somnolence requiring intubation (difficult with success after 6 attempts) in ED and admission to [facility] MICU. While in MICU, patient noted with increasing O2 demand with no improvement despite optimal vent management. Concern noted for progressive ARDS, unable to prone given morbid obesity, and ultimately cannulated for vvECMO on 2/25 and transferred to [facility] for further management on 2/26. While at [facility], tx with diuresis and ABX, and ultimately decannulated and s/p 60XLTCP trach and PEG on 3/7. Patient ultimately transferred to [facility] on 3/11 and course complicated by recurrent high grade fevers and found with fungemia.    NEUROLOGY  # Hx of Pseudotumor Cerebri   - s/p LP in 2024 with high opening pressure  - s/p MRI 2024 with possible pituitary mass but unclear because of pressure effect from pseudotumor cerebri causing partially empty sella.  - Prolactin elevated   - ACTH low but recieved steroids during admission   - FT4 low normal and TSH normal, but given FT4 low normal TSH should be higher   - Discuss endocrine consult for inhouse work up vs outpatient.     # Sedation   - Weaned off sedation in ICU   - Nimbex turned off 2/27   - Prop turned off 3/9   - Precedex turned off and catapres started 3/11  - Continue on catapres 0.3 TID (3/16 - )     # Insomnia   - Patient worked night shift x 15 years   - Trouble sleeping at night leading to day time sleepiness and poor participation with PT  - Continue on seroquel 25 QHS (increased 3/18)   - Continue on melatonin   - Continue on neurontin as below    # BL LE neuropathy   - CIPN concern   - Continue on neurontin 100 BID and 200 QHS  - Monitor pain    CARDIOVASCULAR  # HTN   - HTN noted in ICU requiring cardene and esmolol GTTs   - Lisinopril dc'ed to increase catapres   - Continue on norvasc 10 and catapres 0.3 TID   - Monitor BP     # AFIB   - Hx of pAFIB   - No RVR episodes or pAFIB episodes in ICU   - No rate control medications needed  - Monitor on telemetry     # RV dilation second to PHTN   - POCUS on arrival to [facility] with RVE concern   - TTE 10/2024 with EF 55-60 with normal LVSF, moderate LVH and normal RVSF and size with no concern for pHTN   - TTE on 2/25 at  with EF 61 and normal LVSF, but enlarged RV size with reduced RVSF, mild TR, mild PHTN with PASP 49, and dilated IVC to 3.4cn, but normal TAPSE 2.1.  - Continue on aggressive diuresis in ICU    - TTE 3/11 with RVSF reduced with TAPSE 1.6. IVC improved to 2.12cm.   - Continue on lasix 40 PO BID   - Monitor IO    RESPIRATORY  # ARDS second to MFPNA   - Hx of BEA on CPAP presented to  post URI with SOB and found with AHRF with progression to ARDS second to post viral MFPNA   - Intubated 2/25   - Cannulated for vvECMO 2/26   - s/p 60XLTCP tracheostomy 3/7   - Decannulated 3/7   - CTSx removed tracheostomy and ECMO sutures on 3/17   - Continue on TC QD with PMV as able with strong phonation  - Trialing  today with 5L NC and able to tolerate well   - Continue on PS 18/10/40 QHS given OHS   - Continue on nebs and HTS     GI  # Dysphagia   - s/p PEG 3/7   - Attempted green dye on 3/13 and failed  - Continue on TF  - Continue on miralax and senna  - RPT green dye passed 3/19 and pending FEEST    # Mild transaminitis   - LFTs elevated in ICU with TBILI elevated likely from ECMO hemolysis   - US ABD with hepatic steatosis   - Trend LFTs      RENAL  # ELAL   - ELLA likely from cardiorenal with RVE   - Diuresed in ICU and improved   - Monitor renal function and UOP     # Hypernatremia   - Hypernatremia with fever spikes   - Fever curve improved and attempting to wean FWF   - Continue on  Q6H (decreased 3/19)  - May need to decrease lasix as above if hypernatremia returns    INFECTIOUS DISEASE  # Recurrent fevers with fungemia from unknown source   - Recurrent fevers post ECMO decannulation thought initially to be cytokine surge   - BCx, SCx, UCx, RVP and MRSA RPT on 3/12 negative   - Completed zosyn (3/12-3/18) empirically with improved WBC , however recurrent fevers noted.   - RPT SCx, UA, CXR and RVP negative  - TTE 3/17 with no IE  - PanCT 3/17 with PNA and persistent panniculitis   - BCx 3/15 and 3/16 with candida albicans.   - Concern for possible source from panniculitis   - Continue on caspo (3/17 - ) and pending RPT BCx 3/18   - ID following    # MFPNA and abdominal pannus cellulitis   - Presented to  post URI with SOB and found with AHRF with progression to ARDS second to post viral MFPNA   - RVP, legionella, strept, BAL, BCx, UCx, HIV, PCP, and adenovirus PCR negative   - Initially started on multiple agents in ICU and ultimatelty completed zosyn (2/26-3/9) ZMAX (2/25-2/26) and vanco (2/26-3/1)     # Penile discharge   - GC/ chlamydia negative   - HIV negative   - Monitor for now    # PPX   - MRSA PCR positive and completed bactroban (2/26-3/3)     HEME   # Anemia likely second to ECMO circuit vs AOCD   - HH low in ICU second to ECMO circuit   - HH dropping again today however no bleeding noted   - Microcytic and hemochromic and pending anemia panel   - Trend HH     VASCULAR   # R IJ and BL LE DVTs   - Post ECMO dopplers on 3/9 negative for BL UE/ LE DVTs.   - Subsequent post ECMO dopplers performed on 3/14 and noted with acute, non-occlusive deep vein thrombosis noted within the right internal jugular vein. acute, occlusive deep vein thromboses noted within the right and left peroneal veins at both mid calves, and age-indeterminate, occlusive deep vein thrombosis visualized within the left gastrocnemius vein at the proximal calf.     - Continue on argatroban GTT and change to eliquis     PODIATRY   # BL plantar feet blisters likely pressors induced  - Seen by podiatry and blisters lanced on 3/4 and again on 3/18  - Continue on local wound care per podiatry   - Podiatry following  - OK for WBAT will fu with PT for offloading shoe if appropriate    ENDOCRINE  # Pre-DM2   - A1C 6.7  - Continue on ISS   - Monitor FS   - IF no demand then stop ISS     LINES/ TUBES  - R IJ and R FEM ECMO (2/26-3/7)     SKIN  # Pannus canndial intertrigo   # Sacrum/ buttock MAD  - Continue on clotrimazole cream   - WOC appreciated     ETHICS/ GOC    - FULL CODE     DISPO - PT and PMR recc FLORENCE, however will continue to reassess for acute   38 YO M with PMHx of morbid obesity, BEA on CPAP, pAFIB (not on AC), HTN, and BL papilledema attributed to pseudotumor cerebri who presented initially to [facility] on 2/24 with SOB and BL LE edema. Found with URI outpatient with progressive SOB, and concern for noted for MFPNA on imaging. Course progressed with AHRF with worsening O2 demand, respiratory distress, secretions, and somnolence requiring intubation (difficult with success after 6 attempts) in ED and admission to [facility] MICU. While in MICU, patient noted with increasing O2 demand with no improvement despite optimal vent management. Concern noted for progressive ARDS, unable to prone given morbid obesity, and ultimately cannulated for vvECMO on 2/25 and transferred to [facility] for further management on 2/26. While at [facility], tx with diuresis and ABX, and ultimately decannulated and s/p 60XLTCP trach and PEG on 3/7. Patient ultimately transferred to [facility] on 3/11 and course complicated by recurrent high grade fevers and found with fungemia.    NEUROLOGY  # Hx of Pseudotumor Cerebri   - s/p LP in 2024 with high opening pressure  - s/p MRI 2024 with possible pituitary mass but unclear because of pressure effect from pseudotumor cerebri causing partially empty sella.  - Prolactin elevated   - ACTH low but recieved steroids during admission   - FT4 low normal and TSH normal, but given FT4 low normal TSH should be higher   - Discuss endocrine consult for inhouse work up vs outpatient.     # Sedation   - Weaned off sedation in ICU   - Nimbex turned off 2/27   - Prop turned off 3/9   - Precedex turned off and catapres started 3/11  - Continue on catapres 0.3 TID (3/16 - )     # Insomnia   - Patient worked night shift x 15 years   - Trouble sleeping at night leading to day time sleepiness and poor participation with PT  - Continue on seroquel 25 QHS (increased 3/18)   - Continue on melatonin   - Continue on neurontin as below    # BL LE neuropathy   - CIPN concern   - Continue on neurontin 100 BID and 200 QHS  - Monitor pain    CARDIOVASCULAR  # HTN   - HTN noted in ICU requiring cardene and esmolol GTTs   - Lisinopril dc'ed to increase catapres   - Continue on norvasc 10 and catapres 0.3 TID   - Monitor BP     # AFIB   - Hx of pAFIB   - No RVR episodes or pAFIB episodes in ICU   - No rate control medications needed  - Monitor on telemetry     # RV dilation second to PHTN   - POCUS on arrival to [facility] with RVE concern   - TTE 10/2024 with EF 55-60 with normal LVSF, moderate LVH and normal RVSF and size with no concern for pHTN   - TTE on 2/25 at  with EF 61 and normal LVSF, but enlarged RV size with reduced RVSF, mild TR, mild PHTN with PASP 49, and dilated IVC to 3.4cn, but normal TAPSE 2.1.  - Continue on aggressive diuresis in ICU    - TTE 3/11 with RVSF reduced with TAPSE 1.6. IVC improved to 2.12cm.   - Continue on lasix 40 PO BID   - Monitor IO    RESPIRATORY  # ARDS second to MFPNA   - Hx of BEA on CPAP presented to  post URI with SOB and found with AHRF with progression to ARDS second to post viral MFPNA   - Intubated 2/25   - Cannulated for vvECMO 2/26   - s/p 60XLTCP tracheostomy 3/7   - Decannulated 3/7   - CTSx removed tracheostomy and ECMO sutures on 3/17   - Continue on TC QD with PMV as able with strong phonation  - Trialing  today with 5L NC and able to tolerate well   - Continue on PS 18/10/40 QHS given OHS   - Continue on nebs and HTS     GI  # Dysphagia   - s/p PEG 3/7   - Attempted green dye on 3/13 and failed  - Continue on TF  - Continue on miralax and senna  - RPT green dye passed 3/19 and pending FEEST  - 3/20 Passed FEEST- on soft bite sized with thin liquids     # Mild transaminitis   - LFTs elevated in ICU with TBILI elevated likely from ECMO hemolysis   - US ABD with hepatic steatosis   - Trend LFTs      RENAL  # ELLA   - ELLA likely from cardiorenal with RVE   - Diuresed in ICU and improved   - Monitor renal function and UOP     # Hypernatremia   - Hypernatremia with fever spikes   - Fever curve improved and attempting to wean FWF   - Continue on  Q6H (decreased 3/19)  - May need to decrease lasix as above if hypernatremia returns    INFECTIOUS DISEASE  # Recurrent fevers with fungemia from unknown source   - Recurrent fevers post ECMO decannulation thought initially to be cytokine surge   - BCx, SCx, UCx, RVP and MRSA RPT on 3/12 negative   - Completed zosyn (3/12-3/18) empirically with improved WBC , however recurrent fevers noted.   - RPT SCx, UA, CXR and RVP negative  - TTE 3/17 with no IE  - PanCT 3/17 with PNA and persistent panniculitis   - BCx 3/15 and 3/16 with candida albicans.   - Concern for possible source from panniculitis   - Continue on caspo (3/17 - ) and pending RPT BCx 3/18   - ID following    # MFPNA and abdominal pannus cellulitis   - Presented to  post URI with SOB and found with AHRF with progression to ARDS second to post viral MFPNA   - RVP, legionella, strept, BAL, BCx, UCx, HIV, PCP, and adenovirus PCR negative   - Initially started on multiple agents in ICU and ultimatelty completed zosyn (2/26-3/9) ZMAX (2/25-2/26) and vanco (2/26-3/1)     # Penile discharge   - GC/ chlamydia negative   - HIV negative   - Monitor for now    # PPX   - MRSA PCR positive and completed bactroban (2/26-3/3)     HEME   # Anemia likely second to ECMO circuit vs AOCD   - HH low in ICU second to ECMO circuit   - HH dropping again today however no bleeding noted   - Microcytic and hemochromic and pending anemia panel   - Trend      VASCULAR   # R IJ and BL LE DVTs   - Post ECMO dopplers on 3/9 negative for BL UE/ LE DVTs.   - Subsequent post ECMO dopplers performed on 3/14 and noted with acute, non-occlusive deep vein thrombosis noted within the right internal jugular vein. acute, occlusive deep vein thromboses noted within the right and left peroneal veins at both mid calves, and age-indeterminate, occlusive deep vein thrombosis visualized within the left gastrocnemius vein at the proximal calf.     - Continue on argatroban GTT and change to eliquis     PODIATRY   # BL plantar feet blisters likely pressors induced  - Seen by podiatry and blisters lanced on 3/4 and again on 3/18  - Continue on local wound care per podiatry   - Podiatry following  - OK for WBAT will fu with PT for offloading shoe if appropriate    ENDOCRINE  # Pre-DM2   - A1C 6.7  - Continue on ISS   - Monitor FS   - IF no demand then stop ISS     LINES/ TUBES  - R IJ and R FEM ECMO (2/26-3/7)     SKIN  # Pannus canndial intertrigo   # Sacrum/ buttock MAD  - Continue on clotrimazole cream   - WOC appreciated     ETHICS/ GOC    - FULL CODE     DISPO - PT and PMR recc FLORENCE, however will continue to reassess for acute

## 2025-03-20 NOTE — CONSULT NOTE ADULT - SUBJECTIVE AND OBJECTIVE BOX
Patient is a 39y old  Male who presents with a chief complaint of sob (02 Mar 2025 06:19)      Interval history: trialing red  cap today per chart  on trach collar with PMV  passed green dye test yesterday, FEEST pending    REVIEW OF SYSTEMS  weakness    PAST MEDICAL & SURGICAL HISTORY  HTN (hypertension)    Atrial fibrillation    Papilledema of both eyes    Chronic sinusitis    Morbid obesity    No significant past surgical history         CURRENT FUNCTIONAL STATUS  3/19     Bed Mobility  Bed Mobility Training Rehab Potential: good, to achieve stated therapy goals  Bed Mobility Training Sit-to-Supine: maximum assist (25% patient effort);  2 person assist;  nonverbal cues (demo/gestures);  verbal cues;  bed rails;  *Long sit  Bed Mobility Training Supine-to-Sit: maximum assist (25% patient effort);  2 person assist;  verbal cues;  nonverbal cues (demo/gestures);  bed rails;  *Long sit  Bed Mobility Training Limitations: impaired ability to control trunk for mobility;  decreased ability to use legs for bridging/pushing;  decreased ability to use arms for pushing/pulling;  decreased strength;  impaired balance;  impaired motor control;  impaired postural control    Therapeutic Exercise  Therapeutic Exercise Rehab Effort: good  Therapeutic Exercise Detail: Patient participated in active-assistive therapeutic exercises 2 x 10 throughout bilateral lower extremities. Patient tolerated long sitting ~3-7 seconds each rep x 6 reps.        RECENT LABS/IMAGING  CBC Full  -  ( 20 Mar 2025 06:00 )  WBC Count : 10.21 K/uL  RBC Count : 3.05 M/uL  Hemoglobin : 7.3 g/dL  Hematocrit : 26.2 %  Platelet Count - Automated : 280 K/uL  Mean Cell Volume : 85.9 fL  Mean Cell Hemoglobin : 23.9 pg  Mean Cell Hemoglobin Concentration : 27.9 g/dL  Auto Neutrophil # : x  Auto Lymphocyte # : x  Auto Monocyte # : x  Auto Eosinophil # : x  Auto Basophil # : x  Auto Neutrophil % : x  Auto Lymphocyte % : x  Auto Monocyte % : x  Auto Eosinophil % : x  Auto Basophil % : x    03-20    139  |  95[L]  |  10  ----------------------------<  138[H]  4.1   |  31  |  0.68    Ca    8.8      20 Mar 2025 06:00  Phos  3.8     03-20  Mg     2.00     03-20      Urinalysis Basic - ( 20 Mar 2025 06:00 )    Color: x / Appearance: x / SG: x / pH: x  Gluc: 138 mg/dL / Ketone: x  / Bili: x / Urobili: x   Blood: x / Protein: x / Nitrite: x   Leuk Esterase: x / RBC: x / WBC x   Sq Epi: x / Non Sq Epi: x / Bacteria: x        VITALS  T(C): 36.4 (03-20-25 @ 08:00), Max: 37.9 (03-19-25 @ 12:15)  HR: 90 (03-20-25 @ 08:43) (90 - 120)  BP: 109/59 (03-20-25 @ 08:00) (109/59 - 151/99)  RR: 11 (03-20-25 @ 08:00) (11 - 32)  SpO2: 100% (03-20-25 @ 08:00) (92% - 100%)  Wt(kg): --    ALLERGIES  No Known Allergies      MEDICATIONS   acetaminophen   Oral Liquid .. 650 milliGRAM(s) Oral every 6 hours PRN  albuterol/ipratropium for Nebulization 3 milliLiter(s) Nebulizer every 6 hours  amLODIPine   Tablet 10 milliGRAM(s) Oral daily  apixaban 5 milliGRAM(s) Oral every 12 hours  caspofungin IVPB 70 milliGRAM(s) IV Intermittent every 24 hours  chlorhexidine 0.12% Liquid 15 milliLiter(s) Swish and Spit two times a day  chlorhexidine 2% Cloths 1 Application(s) Topical <User Schedule>  cloNIDine 0.3 milliGRAM(s) Oral every 8 hours  clotrimazole 1% Cream 1 Application(s) Topical two times a day  dextrose 50% Injectable 25 Gram(s) IV Push once  dextrose 50% Injectable 12.5 Gram(s) IV Push once  dextrose 50% Injectable 25 Gram(s) IV Push once  furosemide Solution 40 milliGRAM(s) Oral every 12 hours  gabapentin 200 milliGRAM(s) Oral at bedtime  gabapentin Solution 100 milliGRAM(s) Oral two times a day  glucagon  Injectable 1 milliGRAM(s) IntraMuscular once  HYDROmorphone  Injectable 0.2 milliGRAM(s) IV Push every 6 hours PRN  insulin lispro (ADMELOG) corrective regimen sliding scale   SubCutaneous every 6 hours  melatonin 3 milliGRAM(s) Oral at bedtime  multivitamin/minerals/iron Oral Solution (CENTRUM) 15 milliLiter(s) Oral daily  pantoprazole   Suspension 40 milliGRAM(s) Oral daily  polyethylene glycol 3350 17 Gram(s) Oral daily  QUEtiapine 25 milliGRAM(s) Oral at bedtime  senna Syrup 5 milliLiter(s) Oral at bedtime      ----------------------------------------------------------------------------------------      incomplete note, in progress    Total time spent to review relevant records and imaging results, examine patient, complete documentation, and when applicable discuss the case with the patient, family, , social workers, and medical team:   minutes Patient is a 39y old  Male who presents with a chief complaint of sob (02 Mar 2025 06:19)      Interval history:    on trach collar with PMV  passed green dye test yesterday, FEEST pending    REVIEW OF SYSTEMS  weakness    PAST MEDICAL & SURGICAL HISTORY  HTN (hypertension)    Atrial fibrillation    Papilledema of both eyes    Chronic sinusitis    Morbid obesity    No significant past surgical history         CURRENT FUNCTIONAL STATUS  3/19     Bed Mobility  Bed Mobility Training Rehab Potential: good, to achieve stated therapy goals  Bed Mobility Training Sit-to-Supine: maximum assist (25% patient effort);  2 person assist;  nonverbal cues (demo/gestures);  verbal cues;  bed rails;  *Long sit  Bed Mobility Training Supine-to-Sit: maximum assist (25% patient effort);  2 person assist;  verbal cues;  nonverbal cues (demo/gestures);  bed rails;  *Long sit  Bed Mobility Training Limitations: impaired ability to control trunk for mobility;  decreased ability to use legs for bridging/pushing;  decreased ability to use arms for pushing/pulling;  decreased strength;  impaired balance;  impaired motor control;  impaired postural control    Therapeutic Exercise  Therapeutic Exercise Rehab Effort: good  Therapeutic Exercise Detail: Patient participated in active-assistive therapeutic exercises 2 x 10 throughout bilateral lower extremities. Patient tolerated long sitting ~3-7 seconds each rep x 6 reps.        RECENT LABS/IMAGING  CBC Full  -  ( 20 Mar 2025 06:00 )  WBC Count : 10.21 K/uL  RBC Count : 3.05 M/uL  Hemoglobin : 7.3 g/dL  Hematocrit : 26.2 %  Platelet Count - Automated : 280 K/uL  Mean Cell Volume : 85.9 fL  Mean Cell Hemoglobin : 23.9 pg  Mean Cell Hemoglobin Concentration : 27.9 g/dL  Auto Neutrophil # : x  Auto Lymphocyte # : x  Auto Monocyte # : x  Auto Eosinophil # : x  Auto Basophil # : x  Auto Neutrophil % : x  Auto Lymphocyte % : x  Auto Monocyte % : x  Auto Eosinophil % : x  Auto Basophil % : x    03-20    139  |  95[L]  |  10  ----------------------------<  138[H]  4.1   |  31  |  0.68    Ca    8.8      20 Mar 2025 06:00  Phos  3.8     03-20  Mg     2.00     03-20      Urinalysis Basic - ( 20 Mar 2025 06:00 )    Color: x / Appearance: x / SG: x / pH: x  Gluc: 138 mg/dL / Ketone: x  / Bili: x / Urobili: x   Blood: x / Protein: x / Nitrite: x   Leuk Esterase: x / RBC: x / WBC x   Sq Epi: x / Non Sq Epi: x / Bacteria: x        VITALS  T(C): 36.4 (03-20-25 @ 08:00), Max: 37.9 (03-19-25 @ 12:15)  HR: 90 (03-20-25 @ 08:43) (90 - 120)  BP: 109/59 (03-20-25 @ 08:00) (109/59 - 151/99)  RR: 11 (03-20-25 @ 08:00) (11 - 32)  SpO2: 100% (03-20-25 @ 08:00) (92% - 100%)  Wt(kg): --    ALLERGIES  No Known Allergies      MEDICATIONS   acetaminophen   Oral Liquid .. 650 milliGRAM(s) Oral every 6 hours PRN  albuterol/ipratropium for Nebulization 3 milliLiter(s) Nebulizer every 6 hours  amLODIPine   Tablet 10 milliGRAM(s) Oral daily  apixaban 5 milliGRAM(s) Oral every 12 hours  caspofungin IVPB 70 milliGRAM(s) IV Intermittent every 24 hours  chlorhexidine 0.12% Liquid 15 milliLiter(s) Swish and Spit two times a day  chlorhexidine 2% Cloths 1 Application(s) Topical <User Schedule>  cloNIDine 0.3 milliGRAM(s) Oral every 8 hours  clotrimazole 1% Cream 1 Application(s) Topical two times a day  dextrose 50% Injectable 25 Gram(s) IV Push once  dextrose 50% Injectable 12.5 Gram(s) IV Push once  dextrose 50% Injectable 25 Gram(s) IV Push once  furosemide Solution 40 milliGRAM(s) Oral every 12 hours  gabapentin 200 milliGRAM(s) Oral at bedtime  gabapentin Solution 100 milliGRAM(s) Oral two times a day  glucagon  Injectable 1 milliGRAM(s) IntraMuscular once  HYDROmorphone  Injectable 0.2 milliGRAM(s) IV Push every 6 hours PRN  insulin lispro (ADMELOG) corrective regimen sliding scale   SubCutaneous every 6 hours  melatonin 3 milliGRAM(s) Oral at bedtime  multivitamin/minerals/iron Oral Solution (CENTRUM) 15 milliLiter(s) Oral daily  pantoprazole   Suspension 40 milliGRAM(s) Oral daily  polyethylene glycol 3350 17 Gram(s) Oral daily  QUEtiapine 25 milliGRAM(s) Oral at bedtime  senna Syrup 5 milliLiter(s) Oral at bedtime      ----------------------------------------------------------------------------------------     PHYSICAL EXAM  Constitutional - NAD   HEENT - + trach collar  Chest - no respiratory distress  Cardiovascular -mild tachy  Abdomen -  Soft, NTND  Extremities - dressings b/l feet  Neurologic Exam -                    Cognitive - Awake, Alert, AAO to self, place, date, year, situation      Communication - fluent     Cranial Nerves - CN 2-12 intact     Motor -                     LEFT    UE - 4/5                    RIGHT UE - 4/5                    LEFT    LE -  2/5                     RIGHT LE -  1+/5        Sensory - Intact to LT       Balance - WNL Static  Psychiatric - Mood stable, Affect WNL  ----------------------------------------------------------------------------------------  ASSESSMENT/PLAN  40 yo m PMHx of morbid obesity, BEA on CPAP, pAFIB (not on AC), HTN, and BL papilledema attributed to pseudotumor cerebri who presented initially to [facility] on 2/24 with SOB and BL LE edema. Found with URI outpatient with progressive SOB, and concern for noted for MFPNA on imaging. Course progressed with AHRF with worsening O2 demand, respiratory distress, secretions, and somnolence requiring intubation (difficult with success after 6 attempts) in ED and admission to [facility] MICU. While in MICU, patient noted with increasing O2 demand with no improvement despite optimal vent management. Concern noted for progressive ARDS, unable to prone given morbid obesity, and ultimately cannulated for vvECMO on 2/25 and transferred to [place] for further management on 2/26.   transferred to [facility] 3/11, course complicated by fever. afebrile past 48 hours  s/p trach and peg 3/7, planned for FEES  continue bedside PT and OT    podiatry following for vasopressor associated wounds, please clarify if any wb precautions   Pain - acetaminphen, gabapentin  npo with tube feeds  DVT PPX - on eliquis  Rehab -  tolerated bedside therapy, goal is for acute rehab when medically cleared.       Total time spent to review relevant records and imaging results, examine patient, complete documentation, and when applicable discuss the case with the patient, family, , social workers, and medical team: 35  minutes

## 2025-03-20 NOTE — PROGRESS NOTE ADULT - SUBJECTIVE AND OBJECTIVE BOX
Infectious Diseases Follow Up:    Patient is a 39y old  Male who presents with a chief complaint of sob (02 Mar 2025 06:19)      Interval History/ROS:  No acute events, Tm 100.2 ON.   Pt w/o acute complaints, seen with girlfriend at bedside    Allergies  No Known Allergies        ANTIMICROBIALS:  caspofungin IVPB 70 every 24 hours      Current Abx:     Previous Abx     OTHER MEDS:  MEDICATIONS  (STANDING):  acetaminophen   Oral Liquid .. 650 every 6 hours PRN  albuterol/ipratropium for Nebulization 3 every 6 hours  amLODIPine   Tablet 10 daily  apixaban 5 every 12 hours  cloNIDine 0.3 every 8 hours  dextrose 50% Injectable 25 once  dextrose 50% Injectable 12.5 once  dextrose 50% Injectable 25 once  furosemide Solution 40 every 12 hours  gabapentin 200 at bedtime  gabapentin Solution 100 two times a day  glucagon  Injectable 1 once  HYDROmorphone  Injectable 0.2 every 6 hours PRN  insulin lispro (ADMELOG) corrective regimen sliding scale  every 6 hours  melatonin 3 at bedtime  pantoprazole   Suspension 40 daily  polyethylene glycol 3350 17 daily  QUEtiapine 25 at bedtime  senna Syrup 5 at bedtime      Vital Signs Last 24 Hrs  T(C): 36.4 (20 Mar 2025 08:00), Max: 37.9 (19 Mar 2025 12:15)  T(F): 97.5 (20 Mar 2025 08:00), Max: 100.2 (19 Mar 2025 12:15)  HR: 90 (20 Mar 2025 08:43) (90 - 120)  BP: 109/59 (20 Mar 2025 08:00) (109/59 - 151/99)  BP(mean): --  RR: 11 (20 Mar 2025 08:00) (11 - 32)  SpO2: 100% (20 Mar 2025 08:00) (92% - 100%)    Parameters below as of 20 Mar 2025 08:00  Patient On (Oxygen Delivery Method): ventilator, PS        PHYSICAL EXAM:  GENERAL: NAD  HEAD:  Atraumatic, Normocephalic  EYES: EOMI, conjunctiva and sclera clear  NECK: +trach with speaking valve   CHEST/LUNG: Coarse breath sounds  HEART: RRR  ABDOMEN: Soft, Nontender, Nondistended; some thickening/discoloration, lower abdomen.   EXTREMITIES:  No central lines, PIV well appearing, LE w/o lesions    PSYCH: AAOx3                            7.3    10.21 )-----------( 280      ( 20 Mar 2025 06:00 )             26.2       03-20    139  |  95[L]  |  10  ----------------------------<  138[H]  4.1   |  31  |  0.68    Ca    8.8      20 Mar 2025 06:00  Phos  3.8     03-20  Mg     2.00     03-20        Urinalysis Basic - ( 20 Mar 2025 06:00 )    Color: x / Appearance: x / SG: x / pH: x  Gluc: 138 mg/dL / Ketone: x  / Bili: x / Urobili: x   Blood: x / Protein: x / Nitrite: x   Leuk Esterase: x / RBC: x / WBC x   Sq Epi: x / Non Sq Epi: x / Bacteria: x        MICROBIOLOGY:  v  Blood Blood-Peripheral  03-18-25   No growth at 24 hours  --  --      Nares/Axilla/Groin Nares/Axilla/Groin  03-17-25   Culture in progress  --  --      Blood Blood-Venous  03-16-25   Growth in aerobic bottle: Candida albicans  See previous culture [id number]  --    Growth in aerobic bottle: Yeast like cells      Blood Blood-Peripheral  03-16-25   Growth in aerobic bottle: Candida albicans  See previous culture [id number]  --    Growth in aerobic bottle: Yeast like cells      Trach Asp Tracheal Aspirate  03-16-25   Commensal kyle consistent with body site  --    Few polymorphonuclear leukocytes per low power field  Rare Squamous epithelial cells per low power field  Rare Gram Positive Rods seen per oil power field  Rare Gram Negative Rods seen per oil power field      Blood Blood-Venous  03-15-25   Growth in aerobic bottle: Candida albicans  See previous culture [id number]  --    Growth in aerobic bottle: Yeast like cells      Blood Blood-Peripheral  03-15-25   Growth in aerobic bottle: Candida albicans  Direct identification is available within approximately 3-5  hours either by Blood Panel Multiplexed PCR or Direct  MALDI-TOF. Details: [email or web address]  --  Blood Culture PCR  Candida albicans      Catheterized Catheterized  03-12-25   <10,000 CFU/mL Normal Urogenital Kyle  --  --      Blood Blood-Peripheral  03-11-25   No growth at 5 days  --  --      Blood Blood-Peripheral  03-11-25   No growth at 5 days  --  --      Trach Asp Tracheal Aspirate  03-10-25   Commensal kyle consistent with body site  --    Few polymorphonuclear leukocytes per low power field  No Squamous epithelial cells per low power field  No organisms seen per oil power field      Bronchial Bronchial Lavage  03-05-25   No growth  --    Rare polymorphonuclear leukocytes per low power field  No squamous epithelial cells per low power field  No organisms seen per oil power field      Bronchial Bronchial Lavage  03-01-25   No growth to date  --    Few polymorphonuclear leukocytes per low power field  No Squamous epithelial cells per low power field  No organisms seen per oil power field      Bronchial Bronchial Lavage  02-26-25   No growth  --    Moderate polymorphonuclear leukocytes seen per low power field  No squamous epithelial cells seen per low power field  No organisms seen per oil power field      .Blood Blood  02-26-25   No growth at 5 days  --  --      Catheterized Catheterized  02-26-25   No growth  --  --      Legionella SPUTUM  02-25-25   No Legionella species isolated  --  --      .Sputum Sputum  02-25-25   Commensal kyle consistent with body site  --    Moderate polymorphonuclear leukocytes per low power field  No Squamous epithelial cells per low power field  Rare Gram Positive Cocci in Pairs and Chains per oil power field  Rare Gram Positive Rods per oil power field      Catheterized Catheterized  02-25-25   <10,000 CFU/mL Normal Urogenital Kyle  --  --      .Blood Blood-Peripheral  02-24-25   No growth at 5 days  --  --          Rapid RVP Result: Gudeliateyusra (03-16 @ 16:35)        RADIOLOGY:

## 2025-03-20 NOTE — SWALLOW FEES ASSESSMENT ADULT - ADDITIONAL RECOMMENDATIONS
1.) Monitor PO tolerance and if Patient exhibits signs/symptoms of Aspiration with initiation of oral diet (e.g. CXR revealing new opacities/infiltrates, Fever of Unknown Origin/Cause, Coughing food/liquid out of the Tracheostomy or noted during Tracheostomy care/suctioning) then discontinue oral diet program and provide PEG Tube Feedings Only.   2.) Medical Team advised to Reconsult there is a change in medical/mental status that is impacting on oral nutritional intake  3.) This service to follow as schedule permits for diet tolerance/advancement.

## 2025-03-21 LAB
ANION GAP SERPL CALC-SCNC: 7 MMOL/L — SIGNIFICANT CHANGE UP (ref 7–14)
APPEARANCE UR: CLEAR — SIGNIFICANT CHANGE UP
BACTERIA # UR AUTO: NEGATIVE /HPF — SIGNIFICANT CHANGE UP
BILIRUB UR-MCNC: NEGATIVE — SIGNIFICANT CHANGE UP
BUN SERPL-MCNC: 7 MG/DL — SIGNIFICANT CHANGE UP (ref 7–23)
CALCIUM SERPL-MCNC: 8.9 MG/DL — SIGNIFICANT CHANGE UP (ref 8.4–10.5)
CAST: 2 /LPF — SIGNIFICANT CHANGE UP (ref 0–4)
CHLORIDE SERPL-SCNC: 95 MMOL/L — LOW (ref 98–107)
CO2 SERPL-SCNC: 37 MMOL/L — HIGH (ref 22–31)
COLOR SPEC: YELLOW — SIGNIFICANT CHANGE UP
CREAT SERPL-MCNC: 0.76 MG/DL — SIGNIFICANT CHANGE UP (ref 0.5–1.3)
DIFF PNL FLD: NEGATIVE — SIGNIFICANT CHANGE UP
EGFR: 119 ML/MIN/1.73M2 — SIGNIFICANT CHANGE UP
EGFR: 119 ML/MIN/1.73M2 — SIGNIFICANT CHANGE UP
GAS PNL BLDV: SIGNIFICANT CHANGE UP
GLUCOSE BLDC GLUCOMTR-MCNC: 107 MG/DL — HIGH (ref 70–99)
GLUCOSE BLDC GLUCOMTR-MCNC: 108 MG/DL — HIGH (ref 70–99)
GLUCOSE BLDC GLUCOMTR-MCNC: 113 MG/DL — HIGH (ref 70–99)
GLUCOSE BLDC GLUCOMTR-MCNC: 119 MG/DL — HIGH (ref 70–99)
GLUCOSE SERPL-MCNC: 131 MG/DL — HIGH (ref 70–99)
GLUCOSE UR QL: NEGATIVE MG/DL — SIGNIFICANT CHANGE UP
GRAM STN FLD: ABNORMAL
GRAM STN FLD: SIGNIFICANT CHANGE UP
HCT VFR BLD CALC: 27.2 % — LOW (ref 39–50)
HGB BLD-MCNC: 7.6 G/DL — LOW (ref 13–17)
KETONES UR-MCNC: NEGATIVE MG/DL — SIGNIFICANT CHANGE UP
LEUKOCYTE ESTERASE UR-ACNC: ABNORMAL
MAGNESIUM SERPL-MCNC: 1.8 MG/DL — SIGNIFICANT CHANGE UP (ref 1.6–2.6)
MCHC RBC-ENTMCNC: 24.4 PG — LOW (ref 27–34)
MCHC RBC-ENTMCNC: 27.9 G/DL — LOW (ref 32–36)
MCV RBC AUTO: 87.5 FL — SIGNIFICANT CHANGE UP (ref 80–100)
NITRITE UR-MCNC: NEGATIVE — SIGNIFICANT CHANGE UP
NRBC # BLD AUTO: 0.02 K/UL — HIGH (ref 0–0)
NRBC # FLD: 0.02 K/UL — HIGH (ref 0–0)
NRBC BLD AUTO-RTO: 0 /100 WBCS — SIGNIFICANT CHANGE UP (ref 0–0)
PH UR: 6.5 — SIGNIFICANT CHANGE UP (ref 5–8)
PHOSPHATE SERPL-MCNC: 4.2 MG/DL — SIGNIFICANT CHANGE UP (ref 2.5–4.5)
PLATELET # BLD AUTO: 325 K/UL — SIGNIFICANT CHANGE UP (ref 150–400)
POTASSIUM SERPL-MCNC: 3.5 MMOL/L — SIGNIFICANT CHANGE UP (ref 3.5–5.3)
POTASSIUM SERPL-SCNC: 3.5 MMOL/L — SIGNIFICANT CHANGE UP (ref 3.5–5.3)
PROT UR-MCNC: SIGNIFICANT CHANGE UP MG/DL
RBC # BLD: 3.11 M/UL — LOW (ref 4.2–5.8)
RBC # FLD: 23.6 % — HIGH (ref 10.3–14.5)
RBC CASTS # UR COMP ASSIST: 1 /HPF — SIGNIFICANT CHANGE UP (ref 0–4)
SODIUM SERPL-SCNC: 139 MMOL/L — SIGNIFICANT CHANGE UP (ref 135–145)
SP GR SPEC: 1.01 — SIGNIFICANT CHANGE UP (ref 1–1.03)
SPECIMEN SOURCE: SIGNIFICANT CHANGE UP
SQUAMOUS # UR AUTO: 0 /HPF — SIGNIFICANT CHANGE UP (ref 0–5)
UROBILINOGEN FLD QL: 1 MG/DL — SIGNIFICANT CHANGE UP (ref 0.2–1)
WBC # BLD: 9.14 K/UL — SIGNIFICANT CHANGE UP (ref 3.8–10.5)
WBC # FLD AUTO: 9.14 K/UL — SIGNIFICANT CHANGE UP (ref 3.8–10.5)
WBC UR QL: 2 /HPF — SIGNIFICANT CHANGE UP (ref 0–5)

## 2025-03-21 PROCEDURE — 99233 SBSQ HOSP IP/OBS HIGH 50: CPT

## 2025-03-21 PROCEDURE — G0545: CPT

## 2025-03-21 PROCEDURE — 99232 SBSQ HOSP IP/OBS MODERATE 35: CPT

## 2025-03-21 RX ORDER — HYDROMORPHONE/SOD CHLOR,ISO/PF 2 MG/10 ML
0.2 SYRINGE (ML) INJECTION ONCE
Refills: 0 | Status: DISCONTINUED | OUTPATIENT
Start: 2025-03-21 | End: 2025-03-21

## 2025-03-21 RX ORDER — HYDROMORPHONE/SOD CHLOR,ISO/PF 2 MG/10 ML
0.5 SYRINGE (ML) INJECTION EVERY 6 HOURS
Refills: 0 | Status: DISCONTINUED | OUTPATIENT
Start: 2025-03-21 | End: 2025-03-21

## 2025-03-21 RX ORDER — GABAPENTIN 400 MG/1
300 CAPSULE ORAL EVERY 8 HOURS
Refills: 0 | Status: DISCONTINUED | OUTPATIENT
Start: 2025-03-21 | End: 2025-03-26

## 2025-03-21 RX ORDER — HYDROMORPHONE/SOD CHLOR,ISO/PF 2 MG/10 ML
0.5 SYRINGE (ML) INJECTION EVERY 6 HOURS
Refills: 0 | Status: DISCONTINUED | OUTPATIENT
Start: 2025-03-21 | End: 2025-03-22

## 2025-03-21 RX ADMIN — GABAPENTIN 300 MILLIGRAM(S): 400 CAPSULE ORAL at 16:42

## 2025-03-21 RX ADMIN — FUROSEMIDE 40 MILLIGRAM(S): 10 INJECTION INTRAMUSCULAR; INTRAVENOUS at 05:15

## 2025-03-21 RX ADMIN — GABAPENTIN 300 MILLIGRAM(S): 400 CAPSULE ORAL at 22:26

## 2025-03-21 RX ADMIN — CLOTRIMAZOLE 1 APPLICATION(S): 1 CREAM TOPICAL at 05:10

## 2025-03-21 RX ADMIN — CASPOFUNGIN ACETATE 260 MILLIGRAM(S): 5 INJECTION, POWDER, LYOPHILIZED, FOR SOLUTION INTRAVENOUS at 04:22

## 2025-03-21 RX ADMIN — FUROSEMIDE 40 MILLIGRAM(S): 10 INJECTION INTRAMUSCULAR; INTRAVENOUS at 17:09

## 2025-03-21 RX ADMIN — Medication 0.5 MILLIGRAM(S): at 19:54

## 2025-03-21 RX ADMIN — Medication 0.2 MILLIGRAM(S): at 04:22

## 2025-03-21 RX ADMIN — AMLODIPINE BESYLATE 10 MILLIGRAM(S): 10 TABLET ORAL at 05:09

## 2025-03-21 RX ADMIN — Medication 15 MILLILITER(S): at 11:30

## 2025-03-21 RX ADMIN — IPRATROPIUM BROMIDE AND ALBUTEROL SULFATE 3 MILLILITER(S): .5; 2.5 SOLUTION RESPIRATORY (INHALATION) at 19:24

## 2025-03-21 RX ADMIN — Medication 3 MILLIGRAM(S): at 22:26

## 2025-03-21 RX ADMIN — Medication 0.2 MILLIGRAM(S): at 08:27

## 2025-03-21 RX ADMIN — Medication 0.3 MILLIGRAM(S): at 14:06

## 2025-03-21 RX ADMIN — IPRATROPIUM BROMIDE AND ALBUTEROL SULFATE 3 MILLILITER(S): .5; 2.5 SOLUTION RESPIRATORY (INHALATION) at 14:46

## 2025-03-21 RX ADMIN — IPRATROPIUM BROMIDE AND ALBUTEROL SULFATE 3 MILLILITER(S): .5; 2.5 SOLUTION RESPIRATORY (INHALATION) at 03:34

## 2025-03-21 RX ADMIN — Medication 0.2 MILLIGRAM(S): at 05:16

## 2025-03-21 RX ADMIN — IPRATROPIUM BROMIDE AND ALBUTEROL SULFATE 3 MILLILITER(S): .5; 2.5 SOLUTION RESPIRATORY (INHALATION) at 09:37

## 2025-03-21 RX ADMIN — Medication 0.3 MILLIGRAM(S): at 22:26

## 2025-03-21 RX ADMIN — APIXABAN 5 MILLIGRAM(S): 2.5 TABLET, FILM COATED ORAL at 05:09

## 2025-03-21 RX ADMIN — CLOTRIMAZOLE 1 APPLICATION(S): 1 CREAM TOPICAL at 17:09

## 2025-03-21 RX ADMIN — Medication 0.2 MILLIGRAM(S): at 08:57

## 2025-03-21 RX ADMIN — Medication 0.3 MILLIGRAM(S): at 05:09

## 2025-03-21 RX ADMIN — Medication 5 MILLILITER(S): at 22:25

## 2025-03-21 RX ADMIN — Medication 40 MILLIGRAM(S): at 11:33

## 2025-03-21 RX ADMIN — Medication 1 APPLICATION(S): at 05:10

## 2025-03-21 RX ADMIN — QUETIAPINE FUMARATE 25 MILLIGRAM(S): 25 TABLET ORAL at 22:26

## 2025-03-21 RX ADMIN — APIXABAN 5 MILLIGRAM(S): 2.5 TABLET, FILM COATED ORAL at 17:10

## 2025-03-21 RX ADMIN — Medication 15 MILLILITER(S): at 17:10

## 2025-03-21 RX ADMIN — Medication 15 MILLILITER(S): at 05:08

## 2025-03-21 RX ADMIN — GABAPENTIN 100 MILLIGRAM(S): 400 CAPSULE ORAL at 05:08

## 2025-03-21 RX ADMIN — Medication 0.2 MILLIGRAM(S): at 22:26

## 2025-03-21 NOTE — PROGRESS NOTE ADULT - ASSESSMENT
This is a 40 y/o M w/ PMHx AF (not on AC), HTN, BEA, pseudotumor cerebri s/p LP in 2024, initially presented to [facility] on 2/24, SOB, LE edema with URI symptoms and sick contacts, noted to have severe ARDS, intubated however still hypoxia, cannulated for VV ECMO on 2/25, transferred to [facility] on 2/25, started on empiric Vanco, Zosyn for multifocal PNA, abdominal wall cellulitis, s/p trach placement by CT surgery on 3/7, ECMO decannulated on 3/7. Zosyn held on 3/9.  C/b fevers on 3/10, restarted on Zosyn, transferred to [facility] on 3/13, Bcx now w/ yeast.    #Candida albicans fungemia   #Fevers   #Multifocal PNA, abdominal wall cellulitis s/p Vanco/Zosyn  #ARDS, hypoxic respiratory failure requiring VV EMCO 2/2 multifocal PNA? s/p decannulation on 3/7    Overall, 40 y/o M w/ PMHx AF (not on AC), HTN, BEA, pseudotumor cerebri s/p LP in 2024 admitted to [facility] on 2/26 for ARDS, hypoxic respiratory failure requiring VV EMCO 2/2 multifocal PNA? s/p decannulation on 3/7, c/b abdominal wall cellulitis s/p Vancomycin/Zosyn, s/p trach on 3/7, in the setting of persistent fevers despite Zosyn, BCx now w/ yeast, Candida albicans   Unclear source for yeast in BCx, pt had all central lines removed on 3/7, not getting TPN, no abdominal pain on exam, PEG site well appearing.     CT A/P w/o clear abdominal source of fungemia, possibly 2/2 abdominal wall cellulitis? TTE w/o IE.    Recommendations;   1. Caspofungin 70 mg daily (given weight) (would avoid fluconazole for now given high dose needed). Would plan for 2 week course after negative BCx    2. Repeat BCx on 3/18 NGTD  3. TTE wnl, possible ROBERT if not clearing      Thank you for consulting us and involving us in the management of this patient's case. In addition to reviewing history, imaging, documents, labs, microbiology, and infection control strategies and potential issues.     ID will continue to follow    Shailesh Owens M.D.  Attending Physician  Division of Infectious Diseases  Department of Medicine    Please contact through MS Teams message.  Office: [phone] (after 5 PM or weekend)

## 2025-03-21 NOTE — PROGRESS NOTE ADULT - ASSESSMENT
40 YO M with PMHx of morbid obesity, BEA on CPAP, pAFIB (not on AC), HTN, and BL papilledema attributed to pseudotumor cerebri who presented initially to [facility] on 2/24 with SOB and BL LE edema. Found with URI outpatient with progressive SOB, and concern for noted for MFPNA on imaging. Course progressed with AHRF with worsening O2 demand, respiratory distress, secretions, and somnolence requiring intubation (difficult with success after 6 attempts) in ED and admission to [facility] MICU. While in MICU, patient noted with increasing O2 demand with no improvement despite optimal vent management. Concern noted for progressive ARDS, unable to prone given morbid obesity, and ultimately cannulated for vvECMO on 2/25 and transferred to [facility] for further management on 2/26. While at [facility], tx with diuresis and ABX, and ultimately decannulated and s/p 60XLTCP trach and PEG on 3/7. Patient ultimately transferred to [facility] on 3/11 and course complicated by recurrent high grade fevers and found with fungemia.    NEUROLOGY  # Hx of Pseudotumor Cerebri   - s/p LP in 2024 with high opening pressure  - s/p MRI 2024 with possible pituitary mass but unclear because of pressure effect from pseudotumor cerebri causing partially empty sella.  - Prolactin elevated   - ACTH low but recieved steroids during admission   - FT4 low normal and TSH normal, but given FT4 low normal TSH should be higher   - Discuss endocrine consult for inhouse work up vs outpatient.     # Sedation   - Weaned off sedation in ICU   - Nimbex turned off 2/27   - Prop turned off 3/9   - Precedex turned off and catapres started 3/11  - Continue on catapres 0.3 TID (3/16 - )     # Insomnia   - Patient worked night shift x 15 years   - Trouble sleeping at night leading to day time sleepiness and poor participation with PT  - Continue on seroquel 25 QHS (increased 3/18)   - Continue on melatonin   - Continue on neurontin as below    # BL LE neuropathy   - CIPN concern   - Continue on neurontin 100 BID and 200 QHS  - Monitor pain    CARDIOVASCULAR  # HTN   - HTN noted in ICU requiring cardene and esmolol GTTs   - Lisinopril dc'ed to increase catapres   - Continue on norvasc 10 and catapres 0.3 TID   - Monitor BP     # AFIB   - Hx of pAFIB   - No RVR episodes or pAFIB episodes in ICU   - No rate control medications needed  - Monitor on telemetry     # RV dilation second to PHTN   - POCUS on arrival to [facility] with RVE concern   - TTE 10/2024 with EF 55-60 with normal LVSF, moderate LVH and normal RVSF and size with no concern for pHTN   - TTE on 2/25 at  with EF 61 and normal LVSF, but enlarged RV size with reduced RVSF, mild TR, mild PHTN with PASP 49, and dilated IVC to 3.4cn, but normal TAPSE 2.1.  - Continue on aggressive diuresis in ICU    - TTE 3/11 with RVSF reduced with TAPSE 1.6. IVC improved to 2.12cm.   - Continue on lasix 40 PO BID   - Monitor IO    RESPIRATORY  # ARDS second to MFPNA   - Hx of BEA on CPAP presented to  post URI with SOB and found with AHRF with progression to ARDS second to post viral MFPNA   - Intubated 2/25   - Cannulated for vvECMO 2/26   - s/p 60XLTCP tracheostomy 3/7   - Decannulated 3/7   - CTSx removed tracheostomy and ECMO sutures on 3/17   - Continue on TC QD with PMV as able with strong phonation  - Trialing  today with 5L NC and able to tolerate well   - Continue on PS 18/10/40 QHS given OHS   - Continue on nebs and HTS     GI  # Dysphagia   - s/p PEG 3/7   - Attempted green dye on 3/13 and failed  - Continue on TF  - Continue on miralax and senna  - RPT green dye passed 3/19 and pending FEEST  - 3/20 Passed FEEST- on soft bite sized with thin liquids     # Mild transaminitis   - LFTs elevated in ICU with TBILI elevated likely from ECMO hemolysis   - US ABD with hepatic steatosis   - Trend LFTs      RENAL  # ELLA   - ELLA likely from cardiorenal with RVE   - Diuresed in ICU and improved   - Monitor renal function and UOP     # Hypernatremia   - Hypernatremia with fever spikes   - Fever curve improved and attempting to wean FWF   - Continue on  Q6H (decreased 3/19)  - May need to decrease lasix as above if hypernatremia returns    INFECTIOUS DISEASE  # Recurrent fevers with fungemia from unknown source   - Recurrent fevers post ECMO decannulation thought initially to be cytokine surge   - BCx, SCx, UCx, RVP and MRSA RPT on 3/12 negative   - Completed zosyn (3/12-3/18) empirically with improved WBC , however recurrent fevers noted.   - RPT SCx, UA, CXR and RVP negative  - TTE 3/17 with no IE  - PanCT 3/17 with PNA and persistent panniculitis   - BCx 3/15 and 3/16 with candida albicans.   - Concern for possible source from panniculitis   - Continue on caspo (3/17 - ) and pending RPT BCx 3/18   - ID following    # MFPNA and abdominal pannus cellulitis   - Presented to  post URI with SOB and found with AHRF with progression to ARDS second to post viral MFPNA   - RVP, legionella, strept, BAL, BCx, UCx, HIV, PCP, and adenovirus PCR negative   - Initially started on multiple agents in ICU and ultimatelty completed zosyn (2/26-3/9) ZMAX (2/25-2/26) and vanco (2/26-3/1)     # Penile discharge   - GC/ chlamydia negative   - HIV negative   - Monitor for now    # PPX   - MRSA PCR positive and completed bactroban (2/26-3/3)     HEME   # Anemia likely second to ECMO circuit vs AOCD   - HH low in ICU second to ECMO circuit   - HH dropping again today however no bleeding noted   - Microcytic and hemochromic and pending anemia panel   - Trend      VASCULAR   # R IJ and BL LE DVTs   - Post ECMO dopplers on 3/9 negative for BL UE/ LE DVTs.   - Subsequent post ECMO dopplers performed on 3/14 and noted with acute, non-occlusive deep vein thrombosis noted within the right internal jugular vein. acute, occlusive deep vein thromboses noted within the right and left peroneal veins at both mid calves, and age-indeterminate, occlusive deep vein thrombosis visualized within the left gastrocnemius vein at the proximal calf.     - Continue on argatroban GTT and change to eliquis     PODIATRY   # BL plantar feet blisters likely pressors induced  - Seen by podiatry and blisters lanced on 3/4 and again on 3/18  - Continue on local wound care per podiatry   - Podiatry following  - OK for WBAT will fu with PT for offloading shoe if appropriate    ENDOCRINE  # Pre-DM2   - A1C 6.7  - Continue on ISS   - Monitor FS   - IF no demand then stop ISS     LINES/ TUBES  - R IJ and R FEM ECMO (2/26-3/7)     SKIN  # Pannus canndial intertrigo   # Sacrum/ buttock MAD  - Continue on clotrimazole cream   - WOC appreciated     ETHICS/ GOC    - FULL CODE     DISPO - PT and PMR recc FLORENCE, however will continue to reassess for acute Seen by PMR and goal for acute rehab    38 YO M with PMHx of morbid obesity, BEA on CPAP, pAFIB (not on AC), HTN, and BL papilledema attributed to pseudotumor cerebri who presented initially to [facility] on 2/24 with SOB and BL LE edema. Found with URI outpatient with progressive SOB, and concern for noted for MFPNA on imaging. Course progressed with AHRF with worsening O2 demand, respiratory distress, secretions, and somnolence requiring intubation (difficult with success after 6 attempts) in ED and admission to [facility] MICU. While in MICU, patient noted with increasing O2 demand with no improvement despite optimal vent management. Concern noted for progressive ARDS, unable to prone given morbid obesity, and ultimately cannulated for vvECMO on 2/25 and transferred to [facility] for further management on 2/26. While at [facility], tx with diuresis and ABX, and ultimately decannulated and s/p 60XLTCP trach and PEG on 3/7. Patient ultimately transferred to [facility] on 3/11 and course complicated by recurrent high grade fevers and found with fungemia.    NEUROLOGY  # Hx of Pseudotumor Cerebri   - s/p LP in 2024 with high opening pressure  - s/p MRI 2024 with possible pituitary mass but unclear because of pressure effect from pseudotumor cerebri causing partially empty sella.  - Prolactin elevated   - ACTH low but recieved steroids during admission   - FT4 low normal and TSH normal, but given FT4 low normal TSH should be higher   - Discuss endocrine consult for inhouse work up vs outpatient.     # Sedation   - Weaned off sedation in ICU   - Nimbex turned off 2/27   - Prop turned off 3/9   - Precedex turned off and catapres started 3/11  - Continue on catapres 0.3 TID (3/16 - )     # Insomnia   - Patient worked night shift x 15 years   - Trouble sleeping at night leading to day time sleepiness and poor participation with PT  - Continue on seroquel 25 QHS (increased 3/18)   - Continue on melatonin   - Continue on neurontin as below    # BL LE neuropathy   - CIPN concern   - Continue on neurontin 100 BID and 200 QHS  - Monitor pain-Still with episodes of severe pain - Dilaudid prn dose incresed   - Neurontin increased to 300mg q 8H    CARDIOVASCULAR  # HTN   - HTN noted in ICU requiring cardene and esmolol GTTs   - Lisinopril dc'ed to increase catapres   - Continue on norvasc 10 and catapres 0.3 TID   - Monitor BP     # AFIB   - Hx of pAFIB   - No RVR episodes or pAFIB episodes in ICU   - No rate control medications needed  - Monitor on telemetry     # RV dilation second to PHTN   - POCUS on arrival to [facility] with RVE concern   - TTE 10/2024 with EF 55-60 with normal LVSF, moderate LVH and normal RVSF and size with no concern for pHTN   - TTE on 2/25 at  with EF 61 and normal LVSF, but enlarged RV size with reduced RVSF, mild TR, mild PHTN with PASP 49, and dilated IVC to 3.4cn, but normal TAPSE 2.1.  - Continue on aggressive diuresis in ICU    - TTE 3/11 with RVSF reduced with TAPSE 1.6. IVC improved to 2.12cm.   - Continue on lasix 40 PO BID   - Monitor IO    RESPIRATORY  # ARDS second to MFPNA   - Hx of BEA on CPAP presented to  post URI with SOB and found with AHRF with progression to ARDS second to post viral MFPNA   - Intubated 2/25   - Cannulated for vvECMO 2/26   - s/p 60XLTCP tracheostomy 3/7   - Decannulated 3/7   - CTSx removed tracheostomy and ECMO sutures on 3/17   - Continue on TC QD with PMV as able with strong phonation  - Trialing  today with 5L NC and able to tolerate well   - Continue on PS 18/10/40 QHS given OHS   - Continue on nebs and HTS   -  trials continue     GI  # Dysphagia   - s/p PEG 3/7   - Attempted green dye on 3/13 and failed  - Continue on TF  - Continue on miralax and senna  - RPT green dye passed 3/19 and pending FEEST  - 3/20 Passed FEEST- on soft bite sized with thin liquids     # Mild transaminitis   - LFTs elevated in ICU with TBILI elevated likely from ECMO hemolysis   - US ABD with hepatic steatosis   - Trend LFTs      RENAL  # ELLA   - ELLA likely from cardiorenal with RVE   - Diuresed in ICU and improved   - Monitor renal function and UOP     # Hypernatremia   - Hypernatremia with fever spikes   - Fever curve improved and attempting to wean FWF   - Continue on  Q6H (decreased 3/19)  - May need to decrease lasix as above if hypernatremia returns    INFECTIOUS DISEASE  # Recurrent fevers with fungemia from unknown source   - Recurrent fevers post ECMO decannulation thought initially to be cytokine surge   - BCx, SCx, UCx, RVP and MRSA RPT on 3/12 negative   - Completed zosyn (3/12-3/18) empirically with improved WBC , however recurrent fevers noted.   - RPT SCx, UA, CXR and RVP negative  - TTE 3/17 with no IE  - PanCT 3/17 with PNA and persistent panniculitis   - BCx 3/15 and 3/16 with candida albicans.   - Concern for possible source from panniculitis   - Continue on caspo (3/17 - ) and pending RPT BCx 3/18   - ID following  Would plan for 2 week course after negative BCx      # MFPNA and abdominal pannus cellulitis   - Presented to  post URI with SOB and found with AHRF with progression to ARDS second to post viral MFPNA   - RVP, legionella, strept, BAL, BCx, UCx, HIV, PCP, and adenovirus PCR negative   - Initially started on multiple agents in ICU and ultimatelty completed zosyn (2/26-3/9) ZMAX (2/25-2/26) and vanco (2/26-3/1)     # Penile discharge   - GC/ chlamydia negative   - HIV negative   - Monitor for now    # PPX   - MRSA PCR positive and completed bactroban (2/26-3/3)     HEME   # Anemia likely second to ECMO circuit vs AOCD   - HH low in ICU second to ECMO circuit   - HH dropping again today however no bleeding noted   - Microcytic and hemochromic and pending anemia panel   - Trend HH     VASCULAR   # R IJ and BL LE DVTs   - Post ECMO dopplers on 3/9 negative for BL UE/ LE DVTs.   - Subsequent post ECMO dopplers performed on 3/14 and noted with acute, non-occlusive deep vein thrombosis noted within the right internal jugular vein. acute, occlusive deep vein thromboses noted within the right and left peroneal veins at both mid calves, and age-indeterminate, occlusive deep vein thrombosis visualized within the left gastrocnemius vein at the proximal calf.     - Continue on argatroban GTT and change to eliquis     PODIATRY   # BL plantar feet blisters likely pressors induced  - Seen by podiatry and blisters lanced on 3/4 and again on 3/18  - Continue on local wound care per podiatry   - Podiatry following  - OK for WBAT will fu with PT for offloading shoe if appropriate    ENDOCRINE  # Pre-DM2   - A1C 6.7  - Continue on ISS   - Monitor FS   - IF no demand then stop ISS     LINES/ TUBES  - R IJ and R FEM ECMO (2/26-3/7)     SKIN  # Pannus canndial intertrigo   # Sacrum/ buttock MAD  - Continue on clotrimazole cream   - WOC appreciated     ETHICS/ GOC    - FULL CODE     DISPO - PT and PMR recc FLORENCE, however will continue to reassess for acute Seen by PMR and goal for acute rehab

## 2025-03-21 NOTE — PROGRESS NOTE ADULT - NS ATTEND AMEND GEN_ALL_CORE FT
40 yo M w/ class III obesity, pAfib (no AC), HTN, BEA on CPAP, history of b/l papilledema attributed to pseudotumor cerebri who was transferred from [facility] on 2/26 for VV ECMO 2/2 ARDS. TTE/ROBERT with RV failure and was diuresed. s/p treatment of pneumonia. Now s/p trach and peg. ECMO catheter decannulated 3/7. 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       Plan:     - ARDS s/p VV ecmo. Currently trach dependent, C/W nebs/airway clearance. wean to TC and use PMV for communication. Patient did well  trail on 3/19. Repeat  today   - Continue vent at night given patient most likely has OHS. Will need to trail NIV for the patient while on trach   - OOB, ambulate as tolerated, PT  - C/W oral diuresis.   - PO Diet as tolerated   - c/w Seroquel 25mg QHS for insomnia   - Patient found to be fungemic with candida albicans in 2/4 bottles from blood cx from 3/15  - Most like source is the patient's panus, which on CT showed panniculitis  - wound care team consulted   - TTE negative for vegetation   - c/w Caspo   - f/u repeat blood cx; NGTD on 3/18  - Appreciate ID input   - DVT noted on repeat duplex of the right IJ and bilateral peroneal veins. C/W full ac on Eliquis   - Hx of afib, [id number] of 2. c/w full AC  - Dispo- full code. PT. OOB to chair. Plan for acute rehab

## 2025-03-21 NOTE — PROGRESS NOTE ADULT - SUBJECTIVE AND OBJECTIVE BOX
CHIEF COMPLAINT: Patient is a 39y old  Male who presents with a chief complaint of sob (02 Mar 2025 06:19)      INTERVAL EVENTS: No acute overnight events     ROS: Seen by bedside during AM rounds     OBJECTIVE:  ICU Vital Signs Last 24 Hrs  T(C): 36.3 (21 Mar 2025 04:00), Max: 38.3 (20 Mar 2025 16:00)  T(F): 97.3 (21 Mar 2025 04:00), Max: 100.9 (20 Mar 2025 16:00)  HR: 111 (21 Mar 2025 07:03) (80 - 127)  BP: 126/76 (21 Mar 2025 04:00) (106/59 - 145/76)  BP(mean): --  ABP: --  ABP(mean): --  RR: 15 (21 Mar 2025 04:00) (15 - 38)  SpO2: 100% (21 Mar 2025 07:03) (93% - 100%)    O2 Parameters below as of 21 Mar 2025 04:00  Patient On (Oxygen Delivery Method): ventilator          Mode: CPAP with PS, FiO2: 40, PEEP: 10, PS: 18, MAP: 16, PIP: 27    03-20 @ 07:01  -  03-21 @ 07:00  --------------------------------------------------------  IN: 660 mL / OUT: 1400 mL / NET: -740 mL      CAPILLARY BLOOD GLUCOSE      POCT Blood Glucose.: 108 mg/dL (21 Mar 2025 07:47)    PHYSICAL EXAMINATION  General: NAD and morbidly obese   HEENT: Trach present and able to tolerate PMV well with strong cough and able to cough up clear secretions into mouth   Cards: S1/S2, no murmurs   Pulm: CTA bilaterally. No wheezes.   Abdomen: Soft, nondistended and nontender. PEG (+)   Extremities: No pedal edema. ISI of BL upper and lower extremities with some weakness. Dry dsg on bilat feet   Neurology: Awake and alert with no acute focal neurological deficits       Mode: CPAP with PS  FiO2: 40  PEEP: 10  PS: 18  MAP: 16  PIP: 27      HOSPITAL MEDICATIONS:  MEDICATIONS  (STANDING):  albuterol/ipratropium for Nebulization 3 milliLiter(s) Nebulizer every 6 hours  amLODIPine   Tablet 10 milliGRAM(s) Oral daily  apixaban 5 milliGRAM(s) Oral every 12 hours  caspofungin IVPB 70 milliGRAM(s) IV Intermittent every 24 hours  chlorhexidine 0.12% Liquid 15 milliLiter(s) Swish and Spit two times a day  chlorhexidine 2% Cloths 1 Application(s) Topical <User Schedule>  cloNIDine 0.3 milliGRAM(s) Oral every 8 hours  clotrimazole 1% Cream 1 Application(s) Topical two times a day  dextrose 50% Injectable 25 Gram(s) IV Push once  dextrose 50% Injectable 12.5 Gram(s) IV Push once  dextrose 50% Injectable 25 Gram(s) IV Push once  furosemide Solution 40 milliGRAM(s) Oral every 12 hours  gabapentin 200 milliGRAM(s) Oral at bedtime  gabapentin Solution 100 milliGRAM(s) Oral two times a day  glucagon  Injectable 1 milliGRAM(s) IntraMuscular once  insulin lispro (ADMELOG) corrective regimen sliding scale   SubCutaneous three times a day before meals  melatonin 3 milliGRAM(s) Oral at bedtime  multivitamin/minerals/iron Oral Solution (CENTRUM) 15 milliLiter(s) Oral daily  pantoprazole   Suspension 40 milliGRAM(s) Oral daily  polyethylene glycol 3350 17 Gram(s) Oral daily  QUEtiapine 25 milliGRAM(s) Oral at bedtime  senna Syrup 5 milliLiter(s) Oral at bedtime    MEDICATIONS  (PRN):  acetaminophen   Oral Liquid .. 650 milliGRAM(s) Oral every 6 hours PRN Temp greater or equal to 38C (100.4F), Mild Pain (1 - 3), Moderate Pain (4 - 6)  HYDROmorphone  Injectable 0.2 milliGRAM(s) IV Push every 6 hours PRN Severe Pain (7 - 10)      LABS:                        7.3    10.21 )-----------( 280      ( 20 Mar 2025 06:00 )             26.2     03-20    139  |  95[L]  |  10  ----------------------------<  138[H]  4.1   |  31  |  0.68    Ca    8.8      20 Mar 2025 06:00  Phos  3.8     03-20  Mg     2.00     03-20        Urinalysis Basic - ( 21 Mar 2025 00:12 )    Color: Yellow / Appearance: Clear / S.009 / pH: x  Gluc: x / Ketone: Negative mg/dL  / Bili: Negative / Urobili: 1.0 mg/dL   Blood: x / Protein: Trace mg/dL / Nitrite: Negative   Leuk Esterase: Trace / RBC: 1 /HPF / WBC 2 /HPF   Sq Epi: x / Non Sq Epi: 0 /HPF / Bacteria: Negative /HPF        Venous Blood Gas:   @ 06:00  7.35/75/184/41/100.0  VBG Lactate: 1.3  Venous Blood Gas:   @ 16:17  7.34/75/43/40/74.4  VBG Lactate: 0.8   CHIEF COMPLAINT: Patient is a 39y old  Male who presents with a chief complaint of sob (02 Mar 2025 06:19)      INTERVAL EVENTS: No acute overnight events Severe pain in LE this am     ROS: Seen by bedside during AM rounds     OBJECTIVE:  ICU Vital Signs Last 24 Hrs  T(C): 36.3 (21 Mar 2025 04:00), Max: 38.3 (20 Mar 2025 16:00)  T(F): 97.3 (21 Mar 2025 04:00), Max: 100.9 (20 Mar 2025 16:00)  HR: 111 (21 Mar 2025 07:03) (80 - 127)  BP: 126/76 (21 Mar 2025 04:00) (106/59 - 145/76)  BP(mean): --  ABP: --  ABP(mean): --  RR: 15 (21 Mar 2025 04:00) (15 - 38)  SpO2: 100% (21 Mar 2025 07:03) (93% - 100%)    O2 Parameters below as of 21 Mar 2025 04:00  Patient On (Oxygen Delivery Method): ventilator          Mode: CPAP with PS, FiO2: 40, PEEP: 10, PS: 18, MAP: 16, PIP: 27    03-20 @ 07:01  -  03-21 @ 07:00  --------------------------------------------------------  IN: 660 mL / OUT: 1400 mL / NET: -740 mL      CAPILLARY BLOOD GLUCOSE      POCT Blood Glucose.: 108 mg/dL (21 Mar 2025 07:47)    PHYSICAL EXAMINATION  General: NAD and morbidly obese   HEENT: Trach present and able to tolerate PMV well with strong cough and able to cough up clear secretions into mouth   Cards: S1/S2, no murmurs   Pulm: CTA bilaterally. No wheezes.   Abdomen: Soft, nondistended and nontender. PEG (+)   Extremities: No pedal edema. ISI of BL upper and lower extremities with some weakness. Dry dsg on bilat feet   Neurology: Awake and alert with no acute focal neurological deficits       Mode: CPAP with PS  FiO2: 40  PEEP: 10  PS: 18  MAP: 16  PIP: 27      HOSPITAL MEDICATIONS:  MEDICATIONS  (STANDING):  albuterol/ipratropium for Nebulization 3 milliLiter(s) Nebulizer every 6 hours  amLODIPine   Tablet 10 milliGRAM(s) Oral daily  apixaban 5 milliGRAM(s) Oral every 12 hours  caspofungin IVPB 70 milliGRAM(s) IV Intermittent every 24 hours  chlorhexidine 0.12% Liquid 15 milliLiter(s) Swish and Spit two times a day  chlorhexidine 2% Cloths 1 Application(s) Topical <User Schedule>  cloNIDine 0.3 milliGRAM(s) Oral every 8 hours  clotrimazole 1% Cream 1 Application(s) Topical two times a day  dextrose 50% Injectable 25 Gram(s) IV Push once  dextrose 50% Injectable 12.5 Gram(s) IV Push once  dextrose 50% Injectable 25 Gram(s) IV Push once  furosemide Solution 40 milliGRAM(s) Oral every 12 hours  gabapentin 200 milliGRAM(s) Oral at bedtime  gabapentin Solution 100 milliGRAM(s) Oral two times a day  glucagon  Injectable 1 milliGRAM(s) IntraMuscular once  insulin lispro (ADMELOG) corrective regimen sliding scale   SubCutaneous three times a day before meals  melatonin 3 milliGRAM(s) Oral at bedtime  multivitamin/minerals/iron Oral Solution (CENTRUM) 15 milliLiter(s) Oral daily  pantoprazole   Suspension 40 milliGRAM(s) Oral daily  polyethylene glycol 3350 17 Gram(s) Oral daily  QUEtiapine 25 milliGRAM(s) Oral at bedtime  senna Syrup 5 milliLiter(s) Oral at bedtime    MEDICATIONS  (PRN):  acetaminophen   Oral Liquid .. 650 milliGRAM(s) Oral every 6 hours PRN Temp greater or equal to 38C (100.4F), Mild Pain (1 - 3), Moderate Pain (4 - 6)  HYDROmorphone  Injectable 0.2 milliGRAM(s) IV Push every 6 hours PRN Severe Pain (7 - 10)      LABS:                        7.3    10.21 )-----------( 280      ( 20 Mar 2025 06:00 )             26.2     03-20    139  |  95[L]  |  10  ----------------------------<  138[H]  4.1   |  31  |  0.68    Ca    8.8      20 Mar 2025 06:00  Phos  3.8     03-20  Mg     2.00     03-20        Urinalysis Basic - ( 21 Mar 2025 00:12 )    Color: Yellow / Appearance: Clear / S.009 / pH: x  Gluc: x / Ketone: Negative mg/dL  / Bili: Negative / Urobili: 1.0 mg/dL   Blood: x / Protein: Trace mg/dL / Nitrite: Negative   Leuk Esterase: Trace / RBC: 1 /HPF / WBC 2 /HPF   Sq Epi: x / Non Sq Epi: 0 /HPF / Bacteria: Negative /HPF        Venous Blood Gas:   @ 06:00  7.35/75/184/41/100.0  VBG Lactate: 1.3  Venous Blood Gas:   @ 16:17  7.34/75/43/40/74.4  VBG Lactate: 0.8

## 2025-03-21 NOTE — PROGRESS NOTE ADULT - SUBJECTIVE AND OBJECTIVE BOX
Infectious Diseases Follow Up:    Patient is a 39y old  Male who presents with a chief complaint of sob (02 Mar 2025 06:19)      Interval History/ROS:  Febrile yesterday to 100.9, pt feeling well today, denies any acute symptoms     Allergies  No Known Allergies        ANTIMICROBIALS:  caspofungin IVPB 70 every 24 hours      Current Abx:     Previous Abx     OTHER MEDS:  MEDICATIONS  (STANDING):  acetaminophen   Oral Liquid .. 650 every 6 hours PRN  albuterol/ipratropium for Nebulization 3 every 6 hours  amLODIPine   Tablet 10 daily  apixaban 5 every 12 hours  cloNIDine 0.3 every 8 hours  dextrose 50% Injectable 25 once  dextrose 50% Injectable 12.5 once  dextrose 50% Injectable 25 once  furosemide Solution 40 every 12 hours  gabapentin 300 every 8 hours  glucagon  Injectable 1 once  HYDROmorphone  Injectable 0.5 every 6 hours PRN  insulin lispro (ADMELOG) corrective regimen sliding scale  three times a day before meals  melatonin 3 at bedtime  pantoprazole   Suspension 40 daily  polyethylene glycol 3350 17 daily  QUEtiapine 25 at bedtime  senna Syrup 5 at bedtime      Vital Signs Last 24 Hrs  T(C): 36.3 (21 Mar 2025 08:00), Max: 38.3 (20 Mar 2025 16:00)  T(F): 97.3 (21 Mar 2025 08:00), Max: 100.9 (20 Mar 2025 16:00)  HR: 90 (21 Mar 2025 09:38) (80 - 127)  BP: 131/87 (21 Mar 2025 08:00) (106/59 - 145/76)  BP(mean): --  RR: 25 (21 Mar 2025 08:00) (15 - 38)  SpO2: 94% (21 Mar 2025 09:38) (93% - 100%)    Parameters below as of 21 Mar 2025 09:38  Patient On (Oxygen Delivery Method): tracheostomy collar        PHYSICAL EXAM:  GENERAL: NAD  HEAD:  Atraumatic, Normocephalic  EYES: EOMI, conjunctiva and sclera clear  NECK: +trach with speaking valve   CHEST/LUNG: Coarse breath sounds  HEART: RRR  ABDOMEN: Soft, Nontender, Nondistended;  EXTREMITIES:  No central lines, PIV well appearing,   PSYCH: AAOx3                          7.6    9.14  )-----------( 325      ( 21 Mar 2025 09:07 )             27.2       03-21    139  |  95[L]  |  7   ----------------------------<  131[H]  3.5   |  37[H]  |  0.76    Ca    8.9      21 Mar 2025 09:07  Phos  4.2     03-21  Mg     1.80     03-21        Urinalysis Basic - ( 21 Mar 2025 09:07 )    Color: x / Appearance: x / SG: x / pH: x  Gluc: 131 mg/dL / Ketone: x  / Bili: x / Urobili: x   Blood: x / Protein: x / Nitrite: x   Leuk Esterase: x / RBC: x / WBC x   Sq Epi: x / Non Sq Epi: x / Bacteria: x        MICROBIOLOGY:  v  Blood Blood-Peripheral  03-18-25   No growth at 48 Hours  --  --      Nares/Axilla/Groin Nares/Axilla/Groin  03-17-25   Culture NEGATIVE for Candida auris.  This surveillance culture is intended for Infection Control purposes only.  A negative result does not preclude the carriage of other fungal  organisms.  --  --      Blood Blood-Venous  03-16-25   Growth in aerobic bottle: Candida albicans  See previous culture [id number]  --    Growth in aerobic bottle: Yeast like cells      Blood Blood-Peripheral  03-16-25   Growth in aerobic bottle: Candida albicans  See previous culture [id number]  --    Growth in aerobic bottle: Yeast like cells      Trach Asp Tracheal Aspirate  03-16-25   Commensal kyle consistent with body site  --    Few polymorphonuclear leukocytes per low power field  Rare Squamous epithelial cells per low power field  Rare Gram Positive Rods seen per oil power field  Rare Gram Negative Rods seen per oil power field      Blood Blood-Venous  03-15-25   Growth in aerobic bottle: Candida albicans  See previous culture [id number]  --    Growth in aerobic bottle: Yeast like cells      Blood Blood-Peripheral  03-15-25   Growth in aerobic bottle: Candida albicans  Direct identification is available within approximately 3-5  hours either by Blood Panel Multiplexed PCR or Direct  MALDI-TOF. Details: [email or web address]  --  Blood Culture PCR  Candida albicans      Catheterized Catheterized  03-12-25   <10,000 CFU/mL Normal Urogenital Kyle  --  --      Blood Blood-Peripheral  03-11-25   No growth at 5 days  --  --      Blood Blood-Peripheral  03-11-25   No growth at 5 days  --  --      Trach Asp Tracheal Aspirate  03-10-25   Commensal kyle consistent with body site  --    Few polymorphonuclear leukocytes per low power field  No Squamous epithelial cells per low power field  No organisms seen per oil power field      Bronchial Bronchial Lavage  03-05-25   No growth  --    Rare polymorphonuclear leukocytes per low power field  No squamous epithelial cells per low power field  No organisms seen per oil power field      Bronchial Bronchial Lavage  03-01-25   No growth to date  --    Few polymorphonuclear leukocytes per low power field  No Squamous epithelial cells per low power field  No organisms seen per oil power field      Bronchial Bronchial Lavage  02-26-25   No growth  --    Moderate polymorphonuclear leukocytes seen per low power field  No squamous epithelial cells seen per low power field  No organisms seen per oil power field      .Blood Blood  02-26-25   No growth at 5 days  --  --      Catheterized Catheterized  02-26-25   No growth  --  --      Legionella SPUTUM  02-25-25   No Legionella species isolated  --  --      .Sputum Sputum  02-25-25   Commensal kyle consistent with body site  --    Moderate polymorphonuclear leukocytes per low power field  No Squamous epithelial cells per low power field  Rare Gram Positive Cocci in Pairs and Chains per oil power field  Rare Gram Positive Rods per oil power field      Catheterized Catheterized  02-25-25   <10,000 CFU/mL Normal Urogenital Kyle  --  --      .Blood Blood-Peripheral  02-24-25   No growth at 5 days  --  --          Rapid RVP Result: NotDetec (03-16 @ 16:35)        RADIOLOGY:     Quality 226: Preventive Care And Screening: Tobacco Use: Screening And Cessation Intervention: Patient screened for tobacco use and is an ex/non-smoker Detail Level: Detailed Quality 130: Documentation Of Current Medications In The Medical Record: Current Medications Documented Quality 431: Preventive Care And Screening: Unhealthy Alcohol Use - Screening: Patient screened for unhealthy alcohol use using a single question and scores less than 2 times per year

## 2025-03-22 LAB
-  CANDIDA ALBICANS: SIGNIFICANT CHANGE UP
ANION GAP SERPL CALC-SCNC: 12 MMOL/L — SIGNIFICANT CHANGE UP (ref 7–14)
BUN SERPL-MCNC: 7 MG/DL — SIGNIFICANT CHANGE UP (ref 7–23)
CALCIUM SERPL-MCNC: 8.9 MG/DL — SIGNIFICANT CHANGE UP (ref 8.4–10.5)
CHLORIDE SERPL-SCNC: 89 MMOL/L — LOW (ref 98–107)
CO2 SERPL-SCNC: 35 MMOL/L — HIGH (ref 22–31)
CREAT SERPL-MCNC: 0.75 MG/DL — SIGNIFICANT CHANGE UP (ref 0.5–1.3)
CULTURE RESULTS: SIGNIFICANT CHANGE UP
EGFR: 119 ML/MIN/1.73M2 — SIGNIFICANT CHANGE UP
EGFR: 119 ML/MIN/1.73M2 — SIGNIFICANT CHANGE UP
GLUCOSE BLDC GLUCOMTR-MCNC: 106 MG/DL — HIGH (ref 70–99)
GLUCOSE BLDC GLUCOMTR-MCNC: 126 MG/DL — HIGH (ref 70–99)
GLUCOSE BLDC GLUCOMTR-MCNC: 134 MG/DL — HIGH (ref 70–99)
GLUCOSE SERPL-MCNC: 134 MG/DL — HIGH (ref 70–99)
HCT VFR BLD CALC: 26.6 % — LOW (ref 39–50)
HGB BLD-MCNC: 7.5 G/DL — LOW (ref 13–17)
MAGNESIUM SERPL-MCNC: 1.7 MG/DL — SIGNIFICANT CHANGE UP (ref 1.6–2.6)
MCHC RBC-ENTMCNC: 24.9 PG — LOW (ref 27–34)
MCHC RBC-ENTMCNC: 28.2 G/DL — LOW (ref 32–36)
MCV RBC AUTO: 88.4 FL — SIGNIFICANT CHANGE UP (ref 80–100)
METHOD TYPE: SIGNIFICANT CHANGE UP
NRBC # BLD AUTO: 0.02 K/UL — HIGH (ref 0–0)
NRBC # FLD: 0.02 K/UL — HIGH (ref 0–0)
NRBC BLD AUTO-RTO: 0 /100 WBCS — SIGNIFICANT CHANGE UP (ref 0–0)
PHOSPHATE SERPL-MCNC: 5.3 MG/DL — HIGH (ref 2.5–4.5)
PLATELET # BLD AUTO: 379 K/UL — SIGNIFICANT CHANGE UP (ref 150–400)
POTASSIUM SERPL-MCNC: 3.6 MMOL/L — SIGNIFICANT CHANGE UP (ref 3.5–5.3)
POTASSIUM SERPL-SCNC: 3.6 MMOL/L — SIGNIFICANT CHANGE UP (ref 3.5–5.3)
RBC # BLD: 3.01 M/UL — LOW (ref 4.2–5.8)
RBC # FLD: 23.9 % — HIGH (ref 10.3–14.5)
SODIUM SERPL-SCNC: 136 MMOL/L — SIGNIFICANT CHANGE UP (ref 135–145)
SPECIMEN SOURCE: SIGNIFICANT CHANGE UP
WBC # BLD: 7.06 K/UL — SIGNIFICANT CHANGE UP (ref 3.8–10.5)
WBC # FLD AUTO: 7.06 K/UL — SIGNIFICANT CHANGE UP (ref 3.8–10.5)

## 2025-03-22 PROCEDURE — 99232 SBSQ HOSP IP/OBS MODERATE 35: CPT

## 2025-03-22 RX ORDER — HYDROMORPHONE/SOD CHLOR,ISO/PF 2 MG/10 ML
0.2 SYRINGE (ML) INJECTION ONCE
Refills: 0 | Status: DISCONTINUED | OUTPATIENT
Start: 2025-03-22 | End: 2025-03-22

## 2025-03-22 RX ORDER — HYDROMORPHONE/SOD CHLOR,ISO/PF 2 MG/10 ML
0.5 SYRINGE (ML) INJECTION EVERY 4 HOURS
Refills: 0 | Status: DISCONTINUED | OUTPATIENT
Start: 2025-03-22 | End: 2025-03-25

## 2025-03-22 RX ADMIN — QUETIAPINE FUMARATE 25 MILLIGRAM(S): 25 TABLET ORAL at 22:56

## 2025-03-22 RX ADMIN — Medication 0.5 MILLIGRAM(S): at 09:30

## 2025-03-22 RX ADMIN — AMLODIPINE BESYLATE 10 MILLIGRAM(S): 10 TABLET ORAL at 05:07

## 2025-03-22 RX ADMIN — Medication 0.3 MILLIGRAM(S): at 05:07

## 2025-03-22 RX ADMIN — GABAPENTIN 300 MILLIGRAM(S): 400 CAPSULE ORAL at 13:45

## 2025-03-22 RX ADMIN — Medication 0.5 MILLIGRAM(S): at 18:00

## 2025-03-22 RX ADMIN — GABAPENTIN 300 MILLIGRAM(S): 400 CAPSULE ORAL at 05:07

## 2025-03-22 RX ADMIN — Medication 1 APPLICATION(S): at 05:06

## 2025-03-22 RX ADMIN — GABAPENTIN 300 MILLIGRAM(S): 400 CAPSULE ORAL at 22:56

## 2025-03-22 RX ADMIN — Medication 40 MILLIGRAM(S): at 11:41

## 2025-03-22 RX ADMIN — Medication 0.5 MILLIGRAM(S): at 22:04

## 2025-03-22 RX ADMIN — Medication 0.3 MILLIGRAM(S): at 13:45

## 2025-03-22 RX ADMIN — IPRATROPIUM BROMIDE AND ALBUTEROL SULFATE 3 MILLILITER(S): .5; 2.5 SOLUTION RESPIRATORY (INHALATION) at 04:13

## 2025-03-22 RX ADMIN — Medication 0.5 MILLIGRAM(S): at 12:46

## 2025-03-22 RX ADMIN — Medication 15 MILLILITER(S): at 11:38

## 2025-03-22 RX ADMIN — IPRATROPIUM BROMIDE AND ALBUTEROL SULFATE 3 MILLILITER(S): .5; 2.5 SOLUTION RESPIRATORY (INHALATION) at 15:52

## 2025-03-22 RX ADMIN — Medication 15 MILLILITER(S): at 17:11

## 2025-03-22 RX ADMIN — CASPOFUNGIN ACETATE 260 MILLIGRAM(S): 5 INJECTION, POWDER, LYOPHILIZED, FOR SOLUTION INTRAVENOUS at 04:57

## 2025-03-22 RX ADMIN — Medication 0.5 MILLIGRAM(S): at 13:00

## 2025-03-22 RX ADMIN — Medication 15 MILLILITER(S): at 05:06

## 2025-03-22 RX ADMIN — CLOTRIMAZOLE 1 APPLICATION(S): 1 CREAM TOPICAL at 17:12

## 2025-03-22 RX ADMIN — IPRATROPIUM BROMIDE AND ALBUTEROL SULFATE 3 MILLILITER(S): .5; 2.5 SOLUTION RESPIRATORY (INHALATION) at 09:45

## 2025-03-22 RX ADMIN — Medication 0.3 MILLIGRAM(S): at 22:58

## 2025-03-22 RX ADMIN — Medication 0.5 MILLIGRAM(S): at 02:19

## 2025-03-22 RX ADMIN — IPRATROPIUM BROMIDE AND ALBUTEROL SULFATE 3 MILLILITER(S): .5; 2.5 SOLUTION RESPIRATORY (INHALATION) at 18:42

## 2025-03-22 RX ADMIN — Medication 0.2 MILLIGRAM(S): at 23:12

## 2025-03-22 RX ADMIN — Medication 3 MILLIGRAM(S): at 22:57

## 2025-03-22 RX ADMIN — Medication 0.5 MILLIGRAM(S): at 17:11

## 2025-03-22 RX ADMIN — Medication 0.2 MILLIGRAM(S): at 05:05

## 2025-03-22 RX ADMIN — APIXABAN 5 MILLIGRAM(S): 2.5 TABLET, FILM COATED ORAL at 17:11

## 2025-03-22 RX ADMIN — Medication 0.5 MILLIGRAM(S): at 08:37

## 2025-03-22 RX ADMIN — APIXABAN 5 MILLIGRAM(S): 2.5 TABLET, FILM COATED ORAL at 05:07

## 2025-03-22 RX ADMIN — Medication 0.5 MILLIGRAM(S): at 22:30

## 2025-03-22 RX ADMIN — FUROSEMIDE 40 MILLIGRAM(S): 10 INJECTION INTRAMUSCULAR; INTRAVENOUS at 05:07

## 2025-03-22 RX ADMIN — FUROSEMIDE 40 MILLIGRAM(S): 10 INJECTION INTRAMUSCULAR; INTRAVENOUS at 17:11

## 2025-03-22 RX ADMIN — CLOTRIMAZOLE 1 APPLICATION(S): 1 CREAM TOPICAL at 05:06

## 2025-03-22 NOTE — PROGRESS NOTE ADULT - ASSESSMENT
38 YO M with PMHx of morbid obesity, BEA on CPAP, pAFIB (not on AC), HTN, and BL papilledema attributed to pseudotumor cerebri who presented initially to [facility] on 2/24 with SOB and BL LE edema. Found with URI outpatient with progressive SOB, and concern for noted for MFPNA on imaging. Course progressed with AHRF with worsening O2 demand, respiratory distress, secretions, and somnolence requiring intubation (difficult with success after 6 attempts) in ED and admission to [facility] MICU. While in MICU, patient noted with increasing O2 demand with no improvement despite optimal vent management. Concern noted for progressive ARDS, unable to prone given morbid obesity, and ultimately cannulated for vvECMO on 2/25 and transferred to [facility] for further management on 2/26. While at [facility], tx with diuresis and ABX, and ultimately decannulated and s/p 60XLTCP trach and PEG on 3/7. Patient ultimately transferred to [facility] on 3/11 and course complicated by recurrent high grade fevers and found with fungemia.    NEUROLOGY  # Hx of Pseudotumor Cerebri   - s/p LP in 2024 with high opening pressure  - s/p MRI 2024 with possible pituitary mass but unclear because of pressure effect from pseudotumor cerebri causing partially empty sella.  - Prolactin elevated   - ACTH low but recieved steroids during admission   - FT4 low normal and TSH normal, but given FT4 low normal TSH should be higher   - Discuss endocrine consult for inhouse work up vs outpatient.     # Sedation   - Weaned off sedation in ICU   - Nimbex turned off 2/27   - Prop turned off 3/9   - Precedex turned off and catapres started 3/11  - Continue on catapres 0.3 TID (3/16 - )     # Insomnia   - Patient worked night shift x 15 years   - Trouble sleeping at night leading to day time sleepiness and poor participation with PT  - Continue on seroquel 25 QHS (increased 3/18)   - Continue on melatonin   - Continue on neurontin as below    # BL LE neuropathy   - CIPN concern   - Continue on neurontin 100 BID and 200 QHS  - Monitor pain-Still with episodes of severe pain - Dilaudid prn dose incresed   - Neurontin increased to 300mg q 8H    CARDIOVASCULAR  # HTN   - HTN noted in ICU requiring cardene and esmolol GTTs   - Lisinopril dc'ed to increase catapres   - Continue on norvasc 10 and catapres 0.3 TID   - Monitor BP     # AFIB   - Hx of pAFIB   - No RVR episodes or pAFIB episodes in ICU   - No rate control medications needed  - Monitor on telemetry     # RV dilation second to PHTN   - POCUS on arrival to [facility] with RVE concern   - TTE 10/2024 with EF 55-60 with normal LVSF, moderate LVH and normal RVSF and size with no concern for pHTN   - TTE on 2/25 at  with EF 61 and normal LVSF, but enlarged RV size with reduced RVSF, mild TR, mild PHTN with PASP 49, and dilated IVC to 3.4cn, but normal TAPSE 2.1.  - Continue on aggressive diuresis in ICU    - TTE 3/11 with RVSF reduced with TAPSE 1.6. IVC improved to 2.12cm.   - Continue on lasix 40 PO BID   - Monitor IO    RESPIRATORY  # ARDS second to MFPNA   - Hx of BEA on CPAP presented to  post URI with SOB and found with AHRF with progression to ARDS second to post viral MFPNA   - Intubated 2/25   - Cannulated for vvECMO 2/26   - s/p 60XLTCP tracheostomy 3/7   - Decannulated 3/7   - CTSx removed tracheostomy and ECMO sutures on 3/17   - Continue on TC QD with PMV as able with strong phonation  - Trialing  today with 5L NC and able to tolerate well   - Continue on PS 18/10/40 QHS given OHS   - Continue on nebs and HTS   -  trials continue     GI  # Dysphagia   - s/p PEG 3/7   - Attempted green dye on 3/13 and failed  - Continue on TF  - Continue on miralax and senna  - RPT green dye passed 3/19 and pending FEEST  - 3/20 Passed FEEST- on soft bite sized with thin liquids     # Mild transaminitis   - LFTs elevated in ICU with TBILI elevated likely from ECMO hemolysis   - US ABD with hepatic steatosis   - Trend LFTs      RENAL  # ELLA   - ELLA likely from cardiorenal with RVE   - Diuresed in ICU and improved   - Monitor renal function and UOP     # Hypernatremia   - Hypernatremia with fever spikes   - Fever curve improved and attempting to wean FWF   - Continue on  Q6H (decreased 3/19)  - May need to decrease lasix as above if hypernatremia returns    INFECTIOUS DISEASE  # Recurrent fevers with fungemia from unknown source   - Recurrent fevers post ECMO decannulation thought initially to be cytokine surge   - BCx, SCx, UCx, RVP and MRSA RPT on 3/12 negative   - Completed zosyn (3/12-3/18) empirically with improved WBC , however recurrent fevers noted.   - RPT SCx, UA, CXR and RVP negative  - TTE 3/17 with no IE  - PanCT 3/17 with PNA and persistent panniculitis   - BCx 3/15 and 3/16 with candida albicans.   - Concern for possible source from panniculitis   - Continue on caspo (3/17 - ) and pending RPT BCx 3/18   - ID following  Would plan for 2 week course after negative BCx      # MFPNA and abdominal pannus cellulitis   - Presented to  post URI with SOB and found with AHRF with progression to ARDS second to post viral MFPNA   - RVP, legionella, strept, BAL, BCx, UCx, HIV, PCP, and adenovirus PCR negative   - Initially started on multiple agents in ICU and ultimatelty completed zosyn (2/26-3/9) ZMAX (2/25-2/26) and vanco (2/26-3/1)     # Penile discharge   - GC/ chlamydia negative   - HIV negative   - Monitor for now    # PPX   - MRSA PCR positive and completed bactroban (2/26-3/3)     HEME   # Anemia likely second to ECMO circuit vs AOCD   - HH low in ICU second to ECMO circuit   - HH dropping again today however no bleeding noted   - Microcytic and hemochromic and pending anemia panel   - Trend HH     VASCULAR   # R IJ and BL LE DVTs   - Post ECMO dopplers on 3/9 negative for BL UE/ LE DVTs.   - Subsequent post ECMO dopplers performed on 3/14 and noted with acute, non-occlusive deep vein thrombosis noted within the right internal jugular vein. acute, occlusive deep vein thromboses noted within the right and left peroneal veins at both mid calves, and age-indeterminate, occlusive deep vein thrombosis visualized within the left gastrocnemius vein at the proximal calf.     - Continue on argatroban GTT and change to eliquis     PODIATRY   # BL plantar feet blisters likely pressors induced  - Seen by podiatry and blisters lanced on 3/4 and again on 3/18  - Continue on local wound care per podiatry   - Podiatry following  - OK for WBAT will fu with PT for offloading shoe if appropriate    ENDOCRINE  # Pre-DM2   - A1C 6.7  - Continue on ISS   - Monitor FS   - IF no demand then stop ISS     LINES/ TUBES  - R IJ and R FEM ECMO (2/26-3/7)     SKIN  # Pannus canndial intertrigo   # Sacrum/ buttock MAD  - Continue on clotrimazole cream   - WOC appreciated     ETHICS/ GOC    - FULL CODE     DISPO - PT and PMR recc [facility], however will continue to reassess for acute Seen by PMR and goal for acute rehab    38 YO M with PMHx of morbid obesity, BEA on CPAP, pAFIB (not on AC), HTN, and BL papilledema attributed to pseudotumor cerebri who presented initially to [facility] on 2/24 with SOB and BL LE edema. Found with URI outpatient with progressive SOB, and concern for noted for MFPNA on imaging. Course progressed with AHRF with worsening O2 demand, respiratory distress, secretions, and somnolence requiring intubation (difficult with success after 6 attempts) in ED and admission to [facility] MICU. While in MICU, patient noted with increasing O2 demand with no improvement despite optimal vent management. Concern noted for progressive ARDS, unable to prone given morbid obesity, and ultimately cannulated for vvECMO on 2/25 and transferred to [facility] for further management on 2/26. While at [facility], tx with diuresis and ABX, and ultimately decannulated and s/p 60XLTCP trach and PEG on 3/7. Patient ultimately transferred to [facility] on 3/11 and course complicated by recurrent high grade fevers and found with fungemia.    NEUROLOGY  # Hx of Pseudotumor Cerebri   - s/p LP in 2024 with high opening pressure  - s/p MRI 2024 with possible pituitary mass but unclear because of pressure effect from pseudotumor cerebri causing partially empty sella.  - Prolactin elevated   - ACTH low but recieved steroids during admission   - FT4 low normal and TSH normal, but given FT4 low normal TSH should be higher   - Discuss endocrine consult for inhouse work up vs outpatient.     # Sedation   - Weaned off sedation in ICU   - Nimbex turned off 2/27   - Prop turned off 3/9   - Precedex turned off and catapres started 3/11  - Continue on catapres 0.3 TID (3/16 - )     # Insomnia   - Patient worked night shift x 15 years   - Trouble sleeping at night leading to day time sleepiness and poor participation with PT  - Continue on seroquel 25 QHS (increased 3/18)   - Continue on melatonin   - Continue on neurontin as below    # BL LE neuropathy   - CIPN concern   - Continue on neurontin 100 BID and 200 QHS  - Monitor pain-Still with episodes of severe pain - Dilaudid prn dose incresed   - Neurontin increased to 300mg q 8H    CARDIOVASCULAR  # HTN   - HTN noted in ICU requiring cardene and esmolol GTTs   - Lisinopril dc'ed to increase catapres   - Continue on norvasc 10 and catapres 0.3 TID   - Monitor BP     # AFIB   - Hx of pAFIB   - No RVR episodes or pAFIB episodes in ICU   - No rate control medications needed  - Monitor on telemetry     # RV dilation second to PHTN   - POCUS on arrival to [facility] with RVE concern   - TTE 10/2024 with EF 55-60 with normal LVSF, moderate LVH and normal RVSF and size with no concern for pHTN   - TTE on 2/25 at  with EF 61 and normal LVSF, but enlarged RV size with reduced RVSF, mild TR, mild PHTN with PASP 49, and dilated IVC to 3.4cn, but normal TAPSE 2.1.  - Continue on aggressive diuresis in ICU    - TTE 3/11 with RVSF reduced with TAPSE 1.6. IVC improved to 2.12cm.   - Continue on lasix 40 PO BID   - Monitor IO    RESPIRATORY  # ARDS second to MFPNA   - Hx of BEA on CPAP presented to  post URI with SOB and found with AHRF with progression to ARDS second to post viral MFPNA   - Intubated 2/25   - Cannulated for vvECMO 2/26   - s/p 60XLTCP tracheostomy 3/7   - Decannulated 3/7   - CTSx removed tracheostomy and ECMO sutures on 3/17   - Continue on TC QD with PMV as able with strong phonation  - Trialing  today with 5L NC and able to tolerate well   - Continue on PS 18/10/40 QHS given OHS   - Continue on nebs and HTS   -  trials continue     GI  # Dysphagia   - s/p PEG 3/7   - Attempted green dye on 3/13 and failed  - Continue on TF  - Continue on miralax and senna  - RPT green dye passed 3/19 and pending FEEST  - 3/20 Passed FEEST- on soft bite sized with thin liquids     # Mild transaminitis   - LFTs elevated in ICU with TBILI elevated likely from ECMO hemolysis   - US ABD with hepatic steatosis   - Trend LFTs      RENAL  # ELLA   - ELLA likely from cardiorenal with RVE   - Diuresed in ICU and improved   - Monitor renal function and UOP     # Hypernatremia   - Hypernatremia with fever spikes   - Fever curve improved and attempting to wean FWF   - Continue on  Q6H (decreased 3/19)  - May need to decrease lasix as above if hypernatremia returns    INFECTIOUS DISEASE  # Recurrent fevers with fungemia from unknown source   - Recurrent fevers post ECMO decannulation thought initially to be cytokine surge   - BCx, SCx, UCx, RVP and MRSA RPT on 3/12 negative   - Completed zosyn (3/12-3/18) empirically with improved WBC , however recurrent fevers noted.   - RPT SCx, UA, CXR and RVP negative  - TTE 3/17 with no IE  - PanCT 3/17 with PNA and persistent panniculitis   - BCx 3/15 and 3/16 with candida albicans.   - Concern for possible source from panniculitis   - Continue on caspo (3/17 - ) and pending RPT BCx 3/18   - ID following  Would plan for 2 week course after negative BCx      # MFPNA and abdominal pannus cellulitis   - Presented to  post URI with SOB and found with AHRF with progression to ARDS second to post viral MFPNA   - RVP, legionella, strept, BAL, BCx, UCx, HIV, PCP, and adenovirus PCR negative   - Initially started on multiple agents in ICU and ultimatelty completed zosyn (2/26-3/9) ZMAX (2/25-2/26) and vanco (2/26-3/1)     # Penile discharge   - GC/ chlamydia negative   - HIV negative   - Monitor for now    # PPX   - MRSA PCR positive and completed bactroban (2/26-3/3)     HEME   # Anemia likely second to ECMO circuit vs AOCD   - HH low in ICU second to ECMO circuit   - HH dropping again today however no bleeding noted   - Microcytic and hemochromic and pending anemia panel   - Trend HH     VASCULAR   # R IJ and BL LE DVTs   - Post ECMO dopplers on 3/9 negative for BL UE/ LE DVTs.   - Subsequent post ECMO dopplers performed on 3/14 and noted with acute, non-occlusive deep vein thrombosis noted within the right internal jugular vein. acute, occlusive deep vein thromboses noted within the right and left peroneal veins at both mid calves, and age-indeterminate, occlusive deep vein thrombosis visualized within the left gastrocnemius vein at the proximal calf.     - Continue on argatroban GTT and change to eliquis     PODIATRY   # BL plantar feet blisters likely pressors induced  - Seen by podiatry and blisters lanced on 3/4 and again on 3/18  - Continue on local wound care per podiatry   - Podiatry following  - OK for WBAT will fu with PT for offloading shoe if appropriate    ENDOCRINE  # Pre-DM2   - A1C 6.7  - Continue on ISS   - Monitor FS   - IF no demand then stop ISS     LINES/ TUBES  - R IJ and R FEM ECMO (2/26-3/7)     SKIN  # Pannus canndial intertrigo   # Sacrum/ buttock MAD  - Continue on clotrimazole cream   - WOC appreciated     ETHICS/ GOC    - FULL CODE     DISPO - PT and PMR recc [facility], however will continue to reassess for acute Seen by PMR and goal for acute rehab  ****************  3/22-no new events    40 YO M with PMHx of morbid obesity, BEA on CPAP, pAFIB (not on AC), HTN, and BL papilledema attributed to pseudotumor cerebri who presented initially to [facility] on 2/24 with SOB and BL LE edema. Found with URI outpatient with progressive SOB, and concern for noted for MFPNA on imaging. Course progressed with AHRF with worsening O2 demand, respiratory distress, secretions, and somnolence requiring intubation (difficult with success after 6 attempts) in ED and admission to [facility] MICU. While in MICU, patient noted with increasing O2 demand with no improvement despite optimal vent management. Concern noted for progressive ARDS, unable to prone given morbid obesity, and ultimately cannulated for vvECMO on 2/25 and transferred to [facility] for further management on 2/26. While at [facility], tx with diuresis and ABX, and ultimately decannulated and s/p 60XLTCP trach and PEG on 3/7. Patient ultimately transferred to [facility] on 3/11 and course complicated by recurrent high grade fevers and found with fungemia.    NEUROLOGY  # Hx of Pseudotumor Cerebri   - s/p LP in 2024 with high opening pressure  - s/p MRI 2024 with possible pituitary mass but unclear because of pressure effect from pseudotumor cerebri causing partially empty sella.  - Prolactin elevated   - ACTH low but recieved steroids during admission   - FT4 low normal and TSH normal, but given FT4 low normal TSH should be higher   - Discuss endocrine consult for inhouse work up vs outpatient.     # Sedation   - Weaned off sedation in ICU   - Nimbex turned off 2/27   - Prop turned off 3/9   - Precedex turned off and catapres started 3/11  - Continue on catapres 0.3 TID (3/16 - )     # Insomnia   - Patient worked night shift x 15 years   - Trouble sleeping at night leading to day time sleepiness and poor participation with PT  - Continue on seroquel 25 QHS (increased 3/18)   - Continue on melatonin   - Continue on neurontin as below    # BL LE neuropathy   - CIPN concern   - Continue on neurontin 100 BID and 200 QHS  - Monitor pain-Still with episodes of severe pain - Dilaudid prn dose incresed   - Neurontin increased to 300mg q 8H    CARDIOVASCULAR  # HTN   - HTN noted in ICU requiring cardene and esmolol GTTs   - Lisinopril dc'ed to increase catapres   - Continue on norvasc 10 and catapres 0.3 TID   - Monitor BP     # AFIB   - Hx of pAFIB   - No RVR episodes or pAFIB episodes in ICU   - No rate control medications needed  - Monitor on telemetry     # RV dilation second to PHTN   - POCUS on arrival to [facility] with RVE concern   - TTE 10/2024 with EF 55-60 with normal LVSF, moderate LVH and normal RVSF and size with no concern for pHTN   - TTE on 2/25 at  with EF 61 and normal LVSF, but enlarged RV size with reduced RVSF, mild TR, mild PHTN with PASP 49, and dilated IVC to 3.4cn, but normal TAPSE 2.1.  - Continue on aggressive diuresis in ICU    - TTE 3/11 with RVSF reduced with TAPSE 1.6. IVC improved to 2.12cm.   - Continue on lasix 40 PO BID   - Monitor IO    RESPIRATORY  # ARDS second to MFPNA   - Hx of BEA on CPAP presented to  post URI with SOB and found with AHRF with progression to ARDS second to post viral MFPNA   - Intubated 2/25   - Cannulated for vvECMO 2/26   - s/p 60XLTCP tracheostomy 3/7   - Decannulated 3/7   - CTSx removed tracheostomy and ECMO sutures on 3/17   - Continue on TC QD with PMV as able with strong phonation  - Trialing  today with 5L NC and able to tolerate well   - Continue on PS 18/10/40 QHS given OHS   - Continue on nebs and HTS   -  trials continue     GI  # Dysphagia   - s/p PEG 3/7   - Attempted green dye on 3/13 and failed  - Continue on TF  - Continue on miralax and senna  - RPT green dye passed 3/19 and pending FEEST  - 3/20 Passed FEEST- on soft bite sized with thin liquids     # Mild transaminitis   - LFTs elevated in ICU with TBILI elevated likely from ECMO hemolysis   - US ABD with hepatic steatosis   - Trend LFTs      RENAL  # ELLA   - ELLA likely from cardiorenal with RVE   - Diuresed in ICU and improved   - Monitor renal function and UOP     # Hypernatremia   - Hypernatremia with fever spikes   - Fever curve improved and attempting to wean FWF   - Continue on  Q6H (decreased 3/19)  - May need to decrease lasix as above if hypernatremia returns    INFECTIOUS DISEASE  # Recurrent fevers with fungemia from unknown source   - Recurrent fevers post ECMO decannulation thought initially to be cytokine surge   - BCx, SCx, UCx, RVP and MRSA RPT on 3/12 negative   - Completed zosyn (3/12-3/18) empirically with improved WBC , however recurrent fevers noted.   - RPT SCx, UA, CXR and RVP negative  - TTE 3/17 with no IE  - PanCT 3/17 with PNA and persistent panniculitis   - BCx 3/15 and 3/16 with candida albicans.   - Concern for possible source from panniculitis   - Continue on caspo (3/17 - ) and pending RPT BCx 3/18   - ID following  Would plan for 2 week course after negative BCx      # MFPNA and abdominal pannus cellulitis   - Presented to  post URI with SOB and found with AHRF with progression to ARDS second to post viral MFPNA   - RVP, legionella, strept, BAL, BCx, UCx, HIV, PCP, and adenovirus PCR negative   - Initially started on multiple agents in ICU and ultimatelty completed zosyn (2/26-3/9) ZMAX (2/25-2/26) and vanco (2/26-3/1)     # Penile discharge   - GC/ chlamydia negative   - HIV negative   - Monitor for now    # PPX   - MRSA PCR positive and completed bactroban (2/26-3/3)     HEME   # Anemia likely second to ECMO circuit vs AOCD   - HH low in ICU second to ECMO circuit   - HH dropping again today however no bleeding noted   - Microcytic and hemochromic and pending anemia panel   - Trend HH     VASCULAR   # R IJ and BL LE DVTs   - Post ECMO dopplers on 3/9 negative for BL UE/ LE DVTs.   - Subsequent post ECMO dopplers performed on 3/14 and noted with acute, non-occlusive deep vein thrombosis noted within the right internal jugular vein. acute, occlusive deep vein thromboses noted within the right and left peroneal veins at both mid calves, and age-indeterminate, occlusive deep vein thrombosis visualized within the left gastrocnemius vein at the proximal calf.     - Continue on argatroban GTT and change to eliquis     PODIATRY   # BL plantar feet blisters likely pressors induced  - Seen by podiatry and blisters lanced on 3/4 and again on 3/18  - Continue on local wound care per podiatry   - Podiatry following  - OK for WBAT will fu with PT for offloading shoe if appropriate    ENDOCRINE  # Pre-DM2   - A1C 6.7  - Continue on ISS   - Monitor FS   - IF no demand then stop ISS     LINES/ TUBES  - R IJ and R FEM ECMO (2/26-3/7)     SKIN  # Pannus canndial intertrigo   # Sacrum/ buttock MAD  - Continue on clotrimazole cream   - WOC appreciated     ETHICS/ GOC    - FULL CODE     DISPO - PT and PMR recc [facility], however will continue to reassess for acute Seen by PMR and goal for acute rehab  ****************  3/22-no new events   3/23-no new events-cpap o/n

## 2025-03-22 NOTE — PROGRESS NOTE ADULT - NS ATTEND AMEND GEN_ALL_CORE FT
38 yo M w/ class III obesity, pAfib (no AC), HTN, BEA on CPAP, history of b/l papilledema attributed to pseudotumor cerebri who was transferred from [facility] on 2/26 for VV ECMO 2/2 ARDS. TTE/ROBERT with RV failure and was diuresed. s/p treatment of pneumonia. Now s/p trach and peg. ECMO catheter decannulated 3/7. 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       Plan:     - ARDS s/p VV ecmo. Currently trach dependent, C/W nebs/airway clearance. wean to TC and use PMV for communication. Patient did well  trail on 3/19. Repeat  today   - Continue vent at night given patient most likely has OHS. Will need to trail NIV for the patient while on trach   - OOB, ambulate as tolerated, PT  - C/W oral diuresis.   - PO Diet as tolerated   - c/w Seroquel 25mg QHS for insomnia   - Patient found to be fungemic with candida albicans in 2/4 bottles from blood cx from 3/15  - Most like source is the patient's panus, which on CT showed panniculitis  - wound care team consulted   - TTE negative for vegetation   - c/w Caspo   - f/u repeat blood cx; NGTD on 3/18  - Appreciate ID input   - DVT noted on repeat duplex of the right IJ and bilateral peroneal veins. C/W full ac on Eliquis   - Hx of afib, [id number] of 2. c/w full AC  - Dispo- full code. PT. OOB to chair. Plan for acute rehab as above:  40 yo M w/ class III obesity, pAfib (no AC), HTN, BEA on CPAP, history of b/l papilledema attributed to pseudotumor cerebri who was transferred from [facility] on 2/26 for VV ECMO 2/2 ARDS. TTE/ROBERT with RV failure and was diuresed. s/p treatment of pneumonia. Now s/p trach and peg. ECMO catheter decannulated 3/7. 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       Plan:     - ARDS s/p VV ecmo. Currently trach dependent, C/W nebs/airway clearance. wean to TC and use PMV for communication. Patient did well  trail on 3/19. Repeat  today   - Continue vent at night given patient most likely has OHS. Will need to trial of NIV for the patient while on trach   - OOB, ambulate as tolerated, PT  - C/W oral diuresis.   - PO Diet as tolerated   - c/w Seroquel 25mg QHS for insomnia   ID- Patient found to be fungemic with candida albicans in 2/4 bottles from blood cx from 3/15  - Most like source is the patient's panus, which on CT showed panniculitis  - wound care team consulted   - TTE negative for vegetation   - c/w Caspo     - f/u repeat blood cx; NGTD on 3/18  - Appreciate ID input   - DVT noted on repeat duplex of the right IJ and bilateral peroneal veins. C/W full ac on Eliquis   - Hx of afib, [id number] of 2. c/w full AC                     ****neuropathy--gabapentin in place, ? sheep skin socks  - Dispo- full code. PT. OOB to chair. Plan for acute rehab  Roderick Ko MD-Pulmonary   [phone] as above:  38 yo M w/ class III obesity, pAfib (no AC), HTN, BEA on CPAP, history of b/l papilledema attributed to pseudotumor cerebri who was transferred from [facility] on 2/26 for VV ECMO 2/2 ARDS. TTE/ROBERT with RV failure and was diuresed. s/p treatment of pneumonia. Now s/p trach and peg. ECMO catheter decannulated 3/7. 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       Plan:     Resp- ARDS s/p VV ecmo. Currently trach dependent, C/W nebs/airway clearance. wean to TC and use PMV for communication. Patient did well  trail on 3/19. Repeat  today   - Continue vent at night given patient most likely has OHS. Will need to trial of NIV for the patient while on trach   - OOB, ambulate as tolerated, PT  FEN/CV- C/W oral diuresis.   GI- PO Diet as tolerated   Neuro- c/w Seroquel 25mg QHS for insomnia   ID- Patient found to be fungemic with candida albicans in 2/4 bottles from blood cx from 3/15  - Most like source is the patient's panus, which on CT showed panniculitis  - wound care team consulted     - TTE negative for vegetation   - c/w Caspo     - f/u repeat blood cx; NGTD on 3/18  - Appreciate ID input   - DVT noted on repeat duplex of the right IJ and bilateral peroneal veins. C/W full ac on Eliquis   Cards- Hx of afib, [id number] of 2. c/w full AC                     ****neuropathy--gabapentin in place, ? sheep skin socks  - Dispo- full code. PT. OOB to chair. Plan for acute rehab  Roderick Ko MD-Pulmonary   [phone]

## 2025-03-22 NOTE — PROGRESS NOTE ADULT - SUBJECTIVE AND OBJECTIVE BOX
CHIEF COMPLAINT: Patient is a 39y old  Male who presents with a chief complaint of sob (02 Mar 2025 06:19)      INTERVAL EVENTS: Pain 8/10 in LE overnight additional Dilaudid given    ROS: Seen by bedside during AM rounds     OBJECTIVE:  ICU Vital Signs Last 24 Hrs  T(C): 36.6 (22 Mar 2025 04:00), Max: 37.6 (21 Mar 2025 16:00)  T(F): 97.9 (22 Mar 2025 04:00), Max: 99.6 (21 Mar 2025 16:00)  HR: 11 (22 Mar 2025 07:39) (11 - 128)  BP: 135/77 (22 Mar 2025 04:00) (117/71 - 135/77)  BP(mean): --  ABP: --  ABP(mean): --  RR: 18 (22 Mar 2025 04:00) (18 - 29)  SpO2: 98% (22 Mar 2025 07:39) (94% - 100%)    O2 Parameters below as of 22 Mar 2025 04:12  Patient On (Oxygen Delivery Method): ventilator          Mode: CPAP with PS, FiO2: 40, PEEP: 10, PS: 18, MAP: 18, PIP: 28    03-21 @ 07:01  -  03-22 @ 07:00  --------------------------------------------------------  IN: 500 mL / OUT: 7500 mL / NET: -7000 mL      CAPILLARY BLOOD GLUCOSE      POCT Blood Glucose.: 126 mg/dL (22 Mar 2025 07:43)      PHYSICAL EXAMINATION  General: NAD and morbidly obese   HEENT: Trach present and able to tolerate PMV well with strong cough and able to cough up clear secretions into mouth   Cards: S1/S2, no murmurs   Pulm: CTA bilaterally. No wheezes.   Abdomen: Soft, nondistended and nontender. PEG (+)   Extremities: No pedal edema. ISI of BL upper and lower extremities with some weakness. Dry dsg on bilat feet   Neurology: Awake and alert with no acute focal neurological deficits     Mode: CPAP with PS  FiO2: 40  PEEP: 10  PS: 18  MAP: 18  PIP: 28      HOSPITAL MEDICATIONS:  MEDICATIONS  (STANDING):  albuterol/ipratropium for Nebulization 3 milliLiter(s) Nebulizer every 6 hours  amLODIPine   Tablet 10 milliGRAM(s) Oral daily  apixaban 5 milliGRAM(s) Oral every 12 hours  caspofungin IVPB 70 milliGRAM(s) IV Intermittent every 24 hours  chlorhexidine 0.12% Liquid 15 milliLiter(s) Swish and Spit two times a day  chlorhexidine 2% Cloths 1 Application(s) Topical <User Schedule>  cloNIDine 0.3 milliGRAM(s) Oral every 8 hours  clotrimazole 1% Cream 1 Application(s) Topical two times a day  dextrose 50% Injectable 25 Gram(s) IV Push once  dextrose 50% Injectable 12.5 Gram(s) IV Push once  dextrose 50% Injectable 25 Gram(s) IV Push once  furosemide Solution 40 milliGRAM(s) Oral every 12 hours  gabapentin 300 milliGRAM(s) Oral every 8 hours  glucagon  Injectable 1 milliGRAM(s) IntraMuscular once  HYDROmorphone  Injectable 0.2 milliGRAM(s) IV Push once  insulin lispro (ADMELOG) corrective regimen sliding scale   SubCutaneous three times a day before meals  melatonin 3 milliGRAM(s) Oral at bedtime  multivitamin/minerals/iron Oral Solution (CENTRUM) 15 milliLiter(s) Oral daily  pantoprazole   Suspension 40 milliGRAM(s) Oral daily  polyethylene glycol 3350 17 Gram(s) Oral daily  QUEtiapine 25 milliGRAM(s) Oral at bedtime  senna Syrup 5 milliLiter(s) Oral at bedtime    MEDICATIONS  (PRN):  acetaminophen   Oral Liquid .. 650 milliGRAM(s) Oral every 6 hours PRN Temp greater or equal to 38C (100.4F), Mild Pain (1 - 3), Moderate Pain (4 - 6)  HYDROmorphone  Injectable 0.5 milliGRAM(s) IV Push every 6 hours PRN Severe Pain (7 - 10)      LABS:                        7.6    9.14  )-----------( 325      ( 21 Mar 2025 09:07 )             27.2     03-21    139  |  95[L]  |  7   ----------------------------<  131[H]  3.5   |  37[H]  |  0.76    Ca    8.9      21 Mar 2025 09:07  Phos  4.2     03-21  Mg     1.80     03-21        Urinalysis Basic - ( 21 Mar 2025 09:07 )    Color: x / Appearance: x / SG: x / pH: x  Gluc: 131 mg/dL / Ketone: x  / Bili: x / Urobili: x   Blood: x / Protein: x / Nitrite: x   Leuk Esterase: x / RBC: x / WBC x   Sq Epi: x / Non Sq Epi: x / Bacteria: x        Venous Blood Gas:  03-21 @ 09:07  7.36/75/29/42/44.0  VBG Lactate: 1.3

## 2025-03-23 LAB
ANION GAP SERPL CALC-SCNC: 11 MMOL/L — SIGNIFICANT CHANGE UP (ref 7–14)
BUN SERPL-MCNC: 5 MG/DL — LOW (ref 7–23)
CALCIUM SERPL-MCNC: 8.7 MG/DL — SIGNIFICANT CHANGE UP (ref 8.4–10.5)
CHLORIDE SERPL-SCNC: 88 MMOL/L — LOW (ref 98–107)
CO2 SERPL-SCNC: 36 MMOL/L — HIGH (ref 22–31)
CREAT SERPL-MCNC: 0.77 MG/DL — SIGNIFICANT CHANGE UP (ref 0.5–1.3)
CULTURE RESULTS: SIGNIFICANT CHANGE UP
EGFR: 118 ML/MIN/1.73M2 — SIGNIFICANT CHANGE UP
EGFR: 118 ML/MIN/1.73M2 — SIGNIFICANT CHANGE UP
GAS PNL BLDV: SIGNIFICANT CHANGE UP
GLUCOSE BLDC GLUCOMTR-MCNC: 102 MG/DL — HIGH (ref 70–99)
GLUCOSE BLDC GLUCOMTR-MCNC: 106 MG/DL — HIGH (ref 70–99)
GLUCOSE BLDC GLUCOMTR-MCNC: 111 MG/DL — HIGH (ref 70–99)
GLUCOSE BLDC GLUCOMTR-MCNC: 120 MG/DL — HIGH (ref 70–99)
GLUCOSE SERPL-MCNC: 116 MG/DL — HIGH (ref 70–99)
HCT VFR BLD CALC: 25 % — LOW (ref 39–50)
HGB BLD-MCNC: 7.4 G/DL — LOW (ref 13–17)
MAGNESIUM SERPL-MCNC: 1.6 MG/DL — SIGNIFICANT CHANGE UP (ref 1.6–2.6)
MCHC RBC-ENTMCNC: 25.9 PG — LOW (ref 27–34)
MCHC RBC-ENTMCNC: 29.6 G/DL — LOW (ref 32–36)
MCV RBC AUTO: 87.4 FL — SIGNIFICANT CHANGE UP (ref 80–100)
NRBC # BLD AUTO: 0.03 K/UL — HIGH (ref 0–0)
NRBC # FLD: 0.03 K/UL — HIGH (ref 0–0)
NRBC BLD AUTO-RTO: 0 /100 WBCS — SIGNIFICANT CHANGE UP (ref 0–0)
PHOSPHATE SERPL-MCNC: 4.8 MG/DL — HIGH (ref 2.5–4.5)
PLATELET # BLD AUTO: 351 K/UL — SIGNIFICANT CHANGE UP (ref 150–400)
POTASSIUM SERPL-MCNC: 3.5 MMOL/L — SIGNIFICANT CHANGE UP (ref 3.5–5.3)
POTASSIUM SERPL-SCNC: 3.5 MMOL/L — SIGNIFICANT CHANGE UP (ref 3.5–5.3)
RBC # BLD: 2.86 M/UL — LOW (ref 4.2–5.8)
RBC # FLD: 23.5 % — HIGH (ref 10.3–14.5)
SODIUM SERPL-SCNC: 135 MMOL/L — SIGNIFICANT CHANGE UP (ref 135–145)
SPECIMEN SOURCE: SIGNIFICANT CHANGE UP
WBC # BLD: 6.91 K/UL — SIGNIFICANT CHANGE UP (ref 3.8–10.5)
WBC # FLD AUTO: 6.91 K/UL — SIGNIFICANT CHANGE UP (ref 3.8–10.5)

## 2025-03-23 PROCEDURE — 99232 SBSQ HOSP IP/OBS MODERATE 35: CPT

## 2025-03-23 RX ORDER — HYDROMORPHONE/SOD CHLOR,ISO/PF 2 MG/10 ML
0.5 SYRINGE (ML) INJECTION ONCE
Refills: 0 | Status: DISCONTINUED | OUTPATIENT
Start: 2025-03-23 | End: 2025-03-23

## 2025-03-23 RX ORDER — MAGNESIUM SULFATE 500 MG/ML
1 SYRINGE (ML) INJECTION ONCE
Refills: 0 | Status: COMPLETED | OUTPATIENT
Start: 2025-03-23 | End: 2025-03-23

## 2025-03-23 RX ADMIN — Medication 0.5 MILLIGRAM(S): at 18:45

## 2025-03-23 RX ADMIN — Medication 15 MILLILITER(S): at 17:31

## 2025-03-23 RX ADMIN — Medication 0.5 MILLIGRAM(S): at 17:32

## 2025-03-23 RX ADMIN — GABAPENTIN 300 MILLIGRAM(S): 400 CAPSULE ORAL at 13:42

## 2025-03-23 RX ADMIN — Medication 0.5 MILLIGRAM(S): at 14:20

## 2025-03-23 RX ADMIN — FUROSEMIDE 40 MILLIGRAM(S): 10 INJECTION INTRAMUSCULAR; INTRAVENOUS at 06:20

## 2025-03-23 RX ADMIN — Medication 650 MILLIGRAM(S): at 22:34

## 2025-03-23 RX ADMIN — IPRATROPIUM BROMIDE AND ALBUTEROL SULFATE 3 MILLILITER(S): .5; 2.5 SOLUTION RESPIRATORY (INHALATION) at 09:23

## 2025-03-23 RX ADMIN — Medication 0.5 MILLIGRAM(S): at 22:00

## 2025-03-23 RX ADMIN — Medication 40 MILLIGRAM(S): at 11:08

## 2025-03-23 RX ADMIN — Medication 0.5 MILLIGRAM(S): at 23:42

## 2025-03-23 RX ADMIN — CASPOFUNGIN ACETATE 260 MILLIGRAM(S): 5 INJECTION, POWDER, LYOPHILIZED, FOR SOLUTION INTRAVENOUS at 06:24

## 2025-03-23 RX ADMIN — GABAPENTIN 300 MILLIGRAM(S): 400 CAPSULE ORAL at 21:39

## 2025-03-23 RX ADMIN — CLOTRIMAZOLE 1 APPLICATION(S): 1 CREAM TOPICAL at 06:19

## 2025-03-23 RX ADMIN — Medication 0.5 MILLIGRAM(S): at 02:45

## 2025-03-23 RX ADMIN — GABAPENTIN 300 MILLIGRAM(S): 400 CAPSULE ORAL at 06:20

## 2025-03-23 RX ADMIN — AMLODIPINE BESYLATE 10 MILLIGRAM(S): 10 TABLET ORAL at 06:19

## 2025-03-23 RX ADMIN — IPRATROPIUM BROMIDE AND ALBUTEROL SULFATE 3 MILLILITER(S): .5; 2.5 SOLUTION RESPIRATORY (INHALATION) at 21:26

## 2025-03-23 RX ADMIN — APIXABAN 5 MILLIGRAM(S): 2.5 TABLET, FILM COATED ORAL at 06:18

## 2025-03-23 RX ADMIN — CLOTRIMAZOLE 1 APPLICATION(S): 1 CREAM TOPICAL at 17:32

## 2025-03-23 RX ADMIN — Medication 650 MILLIGRAM(S): at 23:00

## 2025-03-23 RX ADMIN — Medication 0.5 MILLIGRAM(S): at 02:21

## 2025-03-23 RX ADMIN — Medication 1 APPLICATION(S): at 06:20

## 2025-03-23 RX ADMIN — QUETIAPINE FUMARATE 25 MILLIGRAM(S): 25 TABLET ORAL at 21:44

## 2025-03-23 RX ADMIN — Medication 0.5 MILLIGRAM(S): at 21:37

## 2025-03-23 RX ADMIN — Medication 0.3 MILLIGRAM(S): at 06:19

## 2025-03-23 RX ADMIN — IPRATROPIUM BROMIDE AND ALBUTEROL SULFATE 3 MILLILITER(S): .5; 2.5 SOLUTION RESPIRATORY (INHALATION) at 15:58

## 2025-03-23 RX ADMIN — IPRATROPIUM BROMIDE AND ALBUTEROL SULFATE 3 MILLILITER(S): .5; 2.5 SOLUTION RESPIRATORY (INHALATION) at 03:38

## 2025-03-23 RX ADMIN — Medication 5 MILLILITER(S): at 21:39

## 2025-03-23 RX ADMIN — POLYETHYLENE GLYCOL 3350 17 GRAM(S): 17 POWDER, FOR SOLUTION ORAL at 17:32

## 2025-03-23 RX ADMIN — FUROSEMIDE 40 MILLIGRAM(S): 10 INJECTION INTRAMUSCULAR; INTRAVENOUS at 17:32

## 2025-03-23 RX ADMIN — Medication 100 GRAM(S): at 11:07

## 2025-03-23 RX ADMIN — Medication 0.3 MILLIGRAM(S): at 13:42

## 2025-03-23 RX ADMIN — Medication 0.3 MILLIGRAM(S): at 21:44

## 2025-03-23 RX ADMIN — Medication 40 MILLIEQUIVALENT(S): at 11:08

## 2025-03-23 RX ADMIN — Medication 15 MILLILITER(S): at 11:08

## 2025-03-23 RX ADMIN — Medication 0.5 MILLIGRAM(S): at 13:20

## 2025-03-23 RX ADMIN — APIXABAN 5 MILLIGRAM(S): 2.5 TABLET, FILM COATED ORAL at 17:32

## 2025-03-23 RX ADMIN — Medication 0.5 MILLIGRAM(S): at 06:41

## 2025-03-23 RX ADMIN — Medication 15 MILLILITER(S): at 06:19

## 2025-03-23 RX ADMIN — Medication 3 MILLIGRAM(S): at 21:44

## 2025-03-23 NOTE — PROGRESS NOTE ADULT - ASSESSMENT
40 YO M with PMHx of morbid obesity, BEA on CPAP, pAFIB (not on AC), HTN, and BL papilledema attributed to pseudotumor cerebri who presented initially to [facility] on 2/24 with SOB and BL LE edema. Found with URI outpatient with progressive SOB, and concern for noted for MFPNA on imaging. Course progressed with AHRF with worsening O2 demand, respiratory distress, secretions, and somnolence requiring intubation (difficult with success after 6 attempts) in ED and admission to [facility] MICU. While in MICU, patient noted with increasing O2 demand with no improvement despite optimal vent management. Concern noted for progressive ARDS, unable to prone given morbid obesity, and ultimately cannulated for vvECMO on 2/25 and transferred to [facility] for further management on 2/26. While at [facility], tx with diuresis and ABX, and ultimately decannulated and s/p 60XLTCP trach and PEG on 3/7. Patient ultimately transferred to [facility] on 3/11 and course complicated by recurrent high grade fevers and found with fungemia.    NEUROLOGY  # Hx of Pseudotumor Cerebri   - s/p LP in 2024 with high opening pressure  - s/p MRI 2024 with possible pituitary mass but unclear because of pressure effect from pseudotumor cerebri causing partially empty sella.  - Prolactin elevated   - ACTH low but recieved steroids during admission   - FT4 low normal and TSH normal, but given FT4 low normal TSH should be higher   - Discuss endocrine consult for inhouse work up vs outpatient.     # Sedation   - Weaned off sedation in ICU   - Nimbex turned off 2/27   - Prop turned off 3/9   - Precedex turned off and catapres started 3/11  - Continue on catapres 0.3 TID (3/16 - )     # Insomnia   - Patient worked night shift x 15 years   - Trouble sleeping at night leading to day time sleepiness and poor participation with PT  - Continue on seroquel 25 QHS (increased 3/18)   - Continue on melatonin   - Continue on neurontin as below    # BL LE neuropathy   - CIPN concern   - Continue on neurontin 100 BID and 200 QHS  - Monitor pain-Still with episodes of severe pain - Dilaudid prn dose incresed   - Neurontin increased to 300mg q 8H    CARDIOVASCULAR  # HTN   - HTN noted in ICU requiring cardene and esmolol GTTs   - Lisinopril dc'ed to increase catapres   - Continue on norvasc 10 and catapres 0.3 TID   - Monitor BP     # AFIB   - Hx of pAFIB   - No RVR episodes or pAFIB episodes in ICU   - No rate control medications needed  - Monitor on telemetry     # RV dilation second to PHTN   - POCUS on arrival to [facility] with RVE concern   - TTE 10/2024 with EF 55-60 with normal LVSF, moderate LVH and normal RVSF and size with no concern for pHTN   - TTE on 2/25 at  with EF 61 and normal LVSF, but enlarged RV size with reduced RVSF, mild TR, mild PHTN with PASP 49, and dilated IVC to 3.4cn, but normal TAPSE 2.1.  - Continue on aggressive diuresis in ICU    - TTE 3/11 with RVSF reduced with TAPSE 1.6. IVC improved to 2.12cm.   - Continue on lasix 40 PO BID   - Monitor IO    RESPIRATORY  # ARDS second to MFPNA   - Hx of BEA on CPAP presented to  post URI with SOB and found with AHRF with progression to ARDS second to post viral MFPNA   - Intubated 2/25   - Cannulated for vvECMO 2/26   - s/p 60XLTCP tracheostomy 3/7   - Decannulated 3/7   - CTSx removed tracheostomy and ECMO sutures on 3/17   - Continue on TC QD with PMV as able with strong phonation  - Trialing  today with 5L NC and able to tolerate well   - Continue on PS 18/10/40 QHS given OHS   - Continue on nebs and HTS   -  trials continue     GI  # Dysphagia   - s/p PEG 3/7   - Attempted green dye on 3/13 and failed  - Continue on TF  - Continue on miralax and senna  - RPT green dye passed 3/19 and pending FEEST  - 3/20 Passed FEEST- on soft bite sized with thin liquids     # Mild transaminitis   - LFTs elevated in ICU with TBILI elevated likely from ECMO hemolysis   - US ABD with hepatic steatosis   - Trend LFTs      RENAL  # ELLA   - ELLA likely from cardiorenal with RVE   - Diuresed in ICU and improved   - Monitor renal function and UOP     # Hypernatremia   - Hypernatremia with fever spikes   - Fever curve improved and attempting to wean FWF   - Continue on  Q6H (decreased 3/19)  - May need to decrease lasix as above if hypernatremia returns    INFECTIOUS DISEASE  # Recurrent fevers with fungemia from unknown source   - Recurrent fevers post ECMO decannulation thought initially to be cytokine surge   - BCx, SCx, UCx, RVP and MRSA RPT on 3/12 negative   - Completed zosyn (3/12-3/18) empirically with improved WBC , however recurrent fevers noted.   - RPT SCx, UA, CXR and RVP negative  - TTE 3/17 with no IE  - PanCT 3/17 with PNA and persistent panniculitis   - BCx 3/15 and 3/16 with candida albicans.   - Concern for possible source from panniculitis   - Continue on caspo (3/17 - ) and pending RPT BCx 3/18   - ID following  Would plan for 2 week course after negative BCx      # MFPNA and abdominal pannus cellulitis   - Presented to  post URI with SOB and found with AHRF with progression to ARDS second to post viral MFPNA   - RVP, legionella, strept, BAL, BCx, UCx, HIV, PCP, and adenovirus PCR negative   - Initially started on multiple agents in ICU and ultimatelty completed zosyn (2/26-3/9) ZMAX (2/25-2/26) and vanco (2/26-3/1)     # Penile discharge   - GC/ chlamydia negative   - HIV negative   - Monitor for now    # PPX   - MRSA PCR positive and completed bactroban (2/26-3/3)     HEME   # Anemia likely second to ECMO circuit vs AOCD   - HH low in ICU second to ECMO circuit   - HH dropping again today however no bleeding noted   - Microcytic and hemochromic and pending anemia panel   - Trend HH     VASCULAR   # R IJ and BL LE DVTs   - Post ECMO dopplers on 3/9 negative for BL UE/ LE DVTs.   - Subsequent post ECMO dopplers performed on 3/14 and noted with acute, non-occlusive deep vein thrombosis noted within the right internal jugular vein. acute, occlusive deep vein thromboses noted within the right and left peroneal veins at both mid calves, and age-indeterminate, occlusive deep vein thrombosis visualized within the left gastrocnemius vein at the proximal calf.     - Continue on argatroban GTT and change to eliquis     PODIATRY   # BL plantar feet blisters likely pressors induced  - Seen by podiatry and blisters lanced on 3/4 and again on 3/18  - Continue on local wound care per podiatry   - Podiatry following  - OK for WBAT will fu with PT for offloading shoe if appropriate    ENDOCRINE  # Pre-DM2   - A1C 6.7  - Continue on ISS   - Monitor FS   - IF no demand then stop ISS     LINES/ TUBES  - R IJ and R FEM ECMO (2/26-3/7)     SKIN  # Pannus canndial intertrigo   # Sacrum/ buttock MAD  - Continue on clotrimazole cream   - WOC appreciated     ETHICS/ GOC    - FULL CODE     DISPO - PT and PMR recc FLOR[facility], however will continue to reassess for acute Seen by PMR and goal for acute rehab  ****************  3/22-no new events   3/23-no new events-cpap o/n   38 YO M with PMHx of morbid obesity, BEA on CPAP, pAFIB (not on AC), HTN, and BL papilledema attributed to pseudotumor cerebri who presented initially to [facility] on 2/24 with SOB and BL LE edema. Found with URI outpatient with progressive SOB, and concern for noted for MFPNA on imaging. Course progressed with AHRF with worsening O2 demand, respiratory distress, secretions, and somnolence requiring intubation (difficult with success after 6 attempts) in ED and admission to [facility] MICU. While in MICU, patient noted with increasing O2 demand with no improvement despite optimal vent management. Concern noted for progressive ARDS, unable to prone given morbid obesity, and ultimately cannulated for vvECMO on 2/25 and transferred to [facility] for further management on 2/26. While at [facility], tx with diuresis and ABX, and ultimately decannulated and s/p 60XLTCP trach and PEG on 3/7. Patient ultimately transferred to [facility] on 3/11 and course complicated by recurrent high grade fevers and found with fungemia.    NEUROLOGY  # Hx of Pseudotumor Cerebri   - s/p LP in 2024 with high opening pressure  - s/p MRI 2024 with possible pituitary mass but unclear because of pressure effect from pseudotumor cerebri causing partially empty sella.  - Prolactin elevated   - ACTH low but recieved steroids during admission   - FT4 low normal and TSH normal, but given FT4 low normal TSH should be higher   - Discuss endocrine consult for inhouse work up vs outpatient.     # Sedation   - Weaned off sedation in ICU   - Nimbex turned off 2/27   - Prop turned off 3/9   - Precedex turned off and catapres started 3/11  - Continue on catapres 0.3 TID (3/16 - )     # Insomnia   - Patient worked night shift x 15 years   - Trouble sleeping at night leading to day time sleepiness and poor participation with PT  - Continue on seroquel 25 QHS (increased 3/18)   - Continue on melatonin   - Continue on neurontin as below    # BL LE neuropathy   - CIPN concern   - Continue on neurontin 100 BID and 200 QHS  - Monitor pain-Still with episodes of severe pain - Dilaudid prn dose incresed   - Neurontin increased to 300mg q 8H    CARDIOVASCULAR  # HTN   - HTN noted in ICU requiring cardene and esmolol GTTs   - Lisinopril dc'ed to increase catapres   - Continue on norvasc 10 and catapres 0.3 TID   - Monitor BP     # AFIB   - Hx of pAFIB   - No RVR episodes or pAFIB episodes in ICU   - No rate control medications needed  - Monitor on telemetry     # RV dilation second to PHTN   - POCUS on arrival to [facility] with RVE concern   - TTE 10/2024 with EF 55-60 with normal LVSF, moderate LVH and normal RVSF and size with no concern for pHTN   - TTE on 2/25 at  with EF 61 and normal LVSF, but enlarged RV size with reduced RVSF, mild TR, mild PHTN with PASP 49, and dilated IVC to 3.4cn, but normal TAPSE 2.1.  - Continue on aggressive diuresis in ICU    - TTE 3/11 with RVSF reduced with TAPSE 1.6. IVC improved to 2.12cm.   - Continue on lasix 40 PO BID   - Monitor IO    RESPIRATORY  # ARDS second to MFPNA   - Hx of BEA on CPAP presented to  post URI with SOB and found with AHRF with progression to ARDS second to post viral MFPNA   - Intubated 2/25   - Cannulated for vvECMO 2/26   - s/p 60XLTCP tracheostomy 3/7   - Decannulated 3/7   - CTSx removed tracheostomy and ECMO sutures on 3/17   - Continue on TC QD with PMV as able with strong phonation  - Continue on PS 18/10/40 QHS given OHS   - Continue on nebs and HTS   -  trials continue     GI  # Dysphagia   - s/p PEG 3/7   - Attempted green dye on 3/13 and failed  - Continue on TF  - Continue on miralax and senna  - RPT green dye passed 3/19  - 3/20 Passed FEEST - on soft bite sized with thin liquids     # Mild transaminitis   - LFTs elevated in ICU with TBILI elevated likely from ECMO hemolysis   - US ABD with hepatic steatosis   - Trend LFTs      RENAL  # ELLA   - ELLA likely from cardiorenal with RVE   - Diuresed in ICU and improved   - Monitor renal function and UOP     # Hypernatremia   - Hypernatremia with fever spikes   - Fever curve improved and attempting to wean FWF   - Continue on  Q6H (decreased 3/19)    INFECTIOUS DISEASE  # Recurrent fevers with fungemia from unknown source   - Recurrent fevers post ECMO decannulation thought initially to be cytokine surge   - BCx, SCx, UCx, RVP and MRSA RPT on 3/12 negative   - Completed zosyn (3/12-3/18) empirically with improved WBC , however recurrent fevers noted.   - RPT SCx, UA, CXR and RVP negative  - TTE 3/17 with no IE  - PanCT 3/17 with PNA and persistent panniculitis   - BCx 3/15 and 3/16 with candida albicans.   - Concern for possible source from panniculitis   - Continue on caspo (3/17 - ) and RPT BCx 3/18 negative  - ID following  - plan for 2 week course after negative BCx to 4/1    # MFPNA and abdominal pannus cellulitis   - Presented to  post URI with SOB and found with AHRF with progression to ARDS second to post viral MFPNA   - RVP, legionella, strept, BAL, BCx, UCx, HIV, PCP, and adenovirus PCR negative   - Initially started on multiple agents in ICU and ultimatelty completed zosyn (2/26-3/9) ZMAX (2/25-2/26) and vanco (2/26-3/1)     # Penile discharge   - GC/ chlamydia negative   - HIV negative   - Monitor for now    # PPX   - MRSA PCR positive and completed bactroban (2/26-3/3)     HEME   # Anemia likely second to ECMO circuit vs AOCD   - HH low in ICU second to ECMO circuit   - HH dropping again today however no bleeding noted   - Microcytic and hemochromic, sent anemia panel 3/19  - Trend      VASCULAR   # R IJ and BL LE DVTs   - Post ECMO dopplers on 3/9 negative for BL UE/ LE DVTs.   - Subsequent post ECMO dopplers performed on 3/14 and noted with acute, non-occlusive deep vein thrombosis noted within the right internal jugular vein. acute, occlusive deep vein thromboses noted within the right and left peroneal veins at both mid calves, and age-indeterminate, occlusive deep vein thrombosis visualized within the left gastrocnemius vein at the proximal calf.     - Continue on argatroban GTT and change to eliquis     PODIATRY   # BL plantar feet blisters likely pressors induced  - Seen by podiatry and blisters lanced on 3/4 and again on 3/18  - Continue on local wound care per podiatry   - Podiatry following  - OK for WBAT will fu with PT for offloading shoe if appropriate    ENDOCRINE  # Pre-DM2   - A1C 6.7  - Continue on ISS   - Monitor FS   - IF no demand then stop ISS     LINES/ TUBES  - R IJ and R FEM ECMO (2/26-3/7)     SKIN  # Pannus canndial intertrigo   # Sacrum/ buttock MAD  - Continue on clotrimazole cream   - WOC appreciated     ETHICS/ GOC    - FULL CODE     DISPO - PT and PMR recc [facility]OR[facility], however will continue to reassess for acute Seen by PMR and goal for acute rehab  ****************  3/22-no new events   3/23-no new events-cpap o/n

## 2025-03-23 NOTE — PROGRESS NOTE ADULT - SUBJECTIVE AND OBJECTIVE BOX
CHIEF COMPLAINT:    INTERVAL EVENTS:     ROS: Seen by bedside during AM rounds     OBJECTIVE:  ICU Vital Signs Last 24 Hrs  T(C): 36.5 (23 Mar 2025 08:00), Max: 36.7 (23 Mar 2025 00:00)  T(F): 97.7 (23 Mar 2025 08:00), Max: 98.1 (23 Mar 2025 00:00)  HR: 115 (23 Mar 2025 09:40) (100 - 123)  BP: 126/73 (23 Mar 2025 08:00) (118/63 - 140/84)  BP(mean): --  ABP: --  ABP(mean): --  RR: 25 (23 Mar 2025 08:00) (17 - 25)  SpO2: 100% (23 Mar 2025 09:40) (95% - 100%)    O2 Parameters below as of 23 Mar 2025 09:40  Patient On (Oxygen Delivery Method): nasal cannula          Mode: CPAP with PS, FiO2: 40, PEEP: 10, PS: 18, MAP: 16, PIP: 28    03-22 @ 07:01  -  03-23 @ 07:00  --------------------------------------------------------  IN: 4450 mL / OUT: 6600 mL / NET: -2150 mL      CAPILLARY BLOOD GLUCOSE      POCT Blood Glucose.: 120 mg/dL (23 Mar 2025 07:59)      PHYSICAL EXAM:  General:   HEENT:   Lymph Nodes:  Neck:   Respiratory:   Cardiovascular:   Abdomen:   Extremities:   Skin:   Neurological:  Psychiatry:    Mode: CPAP with PS  FiO2: 40  PEEP: 10  PS: 18  MAP: 16  PIP: 28      HOSPITAL MEDICATIONS:  MEDICATIONS  (STANDING):  albuterol/ipratropium for Nebulization 3 milliLiter(s) Nebulizer every 6 hours  amLODIPine   Tablet 10 milliGRAM(s) Oral daily  apixaban 5 milliGRAM(s) Oral every 12 hours  caspofungin IVPB 70 milliGRAM(s) IV Intermittent every 24 hours  chlorhexidine 0.12% Liquid 15 milliLiter(s) Swish and Spit two times a day  chlorhexidine 2% Cloths 1 Application(s) Topical <User Schedule>  cloNIDine 0.3 milliGRAM(s) Oral every 8 hours  clotrimazole 1% Cream 1 Application(s) Topical two times a day  dextrose 50% Injectable 25 Gram(s) IV Push once  dextrose 50% Injectable 12.5 Gram(s) IV Push once  dextrose 50% Injectable 25 Gram(s) IV Push once  furosemide Solution 40 milliGRAM(s) Oral every 12 hours  gabapentin 300 milliGRAM(s) Oral every 8 hours  glucagon  Injectable 1 milliGRAM(s) IntraMuscular once  insulin lispro (ADMELOG) corrective regimen sliding scale   SubCutaneous three times a day before meals  magnesium sulfate  IVPB 1 Gram(s) IV Intermittent once  melatonin 3 milliGRAM(s) Oral at bedtime  multivitamin/minerals/iron Oral Solution (CENTRUM) 15 milliLiter(s) Oral daily  pantoprazole   Suspension 40 milliGRAM(s) Oral daily  polyethylene glycol 3350 17 Gram(s) Oral daily  potassium chloride   Powder 40 milliEquivalent(s) Oral once  QUEtiapine 25 milliGRAM(s) Oral at bedtime  senna Syrup 5 milliLiter(s) Oral at bedtime    MEDICATIONS  (PRN):  acetaminophen   Oral Liquid .. 650 milliGRAM(s) Oral every 6 hours PRN Temp greater or equal to 38C (100.4F), Mild Pain (1 - 3), Moderate Pain (4 - 6)  HYDROmorphone  Injectable 0.5 milliGRAM(s) IV Push every 4 hours PRN Severe Pain (7 - 10)      LABS:                        7.4    6.91  )-----------( 351      ( 23 Mar 2025 07:38 )             25.0     03-23    135  |  88[L]  |  5[L]  ----------------------------<  116[H]  3.5   |  36[H]  |  0.77    Ca    8.7      23 Mar 2025 07:38  Phos  4.8     03-23  Mg     1.60     03-23        Urinalysis Basic - ( 23 Mar 2025 07:38 )    Color: x / Appearance: x / SG: x / pH: x  Gluc: 116 mg/dL / Ketone: x  / Bili: x / Urobili: x   Blood: x / Protein: x / Nitrite: x   Leuk Esterase: x / RBC: x / WBC x   Sq Epi: x / Non Sq Epi: x / Bacteria: x        Venous Blood Gas:  03-23 @ 07:38  7.40/69/90/43/97.6  VBG Lactate: 0.7   CHIEF COMPLAINT: Patient is a 39y old  Male who presents with a chief complaint of sob (02 Mar 2025 06:19)      INTERVAL EVENTS:   - no acute overnight events, VSS, afebrile on TC with   - continued on IV Caspofungin to 4/1    ROS: Seen by bedside during AM rounds, endorses b/l LE pain in feet, denies acute complaints    OBJECTIVE:  ICU Vital Signs Last 24 Hrs  T(C): 36.5 (23 Mar 2025 08:00), Max: 36.7 (23 Mar 2025 00:00)  T(F): 97.7 (23 Mar 2025 08:00), Max: 98.1 (23 Mar 2025 00:00)  HR: 115 (23 Mar 2025 09:40) (100 - 123)  BP: 126/73 (23 Mar 2025 08:00) (118/63 - 140/84)  BP(mean): --  ABP: --  ABP(mean): --  RR: 25 (23 Mar 2025 08:00) (17 - 25)  SpO2: 100% (23 Mar 2025 09:40) (95% - 100%)    O2 Parameters below as of 23 Mar 2025 09:40  Patient On (Oxygen Delivery Method): nasal cannula          Mode: CPAP with PS, FiO2: 40, PEEP: 10, PS: 18, MAP: 16, PIP: 28    03-22 @ 07:01  -  03-23 @ 07:00  --------------------------------------------------------  IN: 4450 mL / OUT: 6600 mL / NET: -2150 mL      CAPILLARY BLOOD GLUCOSE      POCT Blood Glucose.: 120 mg/dL (23 Mar 2025 07:59)      PHYSICAL EXAM:  General: NAD  HEENT: +TC with , able to phonate  Respiratory: lungs CTA b/l, no rales/rhonchi or wheeze, normal respiratory effort  Cardiovascular: +sinus tachycardia  Abdomen: soft, non tender, non distended, +PEG  Extremities: b/l LE in c/d/i dressings  Skin: warm, dry  Neurological: AAO x 4, awake and alert, follows commands, no gross focal deficits  Psychiatry: normal affect and behavior    Mode: CPAP with PS  FiO2: 40  PEEP: 10  PS: 18  MAP: 16  PIP: 28      HOSPITAL MEDICATIONS:  MEDICATIONS  (STANDING):  albuterol/ipratropium for Nebulization 3 milliLiter(s) Nebulizer every 6 hours  amLODIPine   Tablet 10 milliGRAM(s) Oral daily  apixaban 5 milliGRAM(s) Oral every 12 hours  caspofungin IVPB 70 milliGRAM(s) IV Intermittent every 24 hours  chlorhexidine 0.12% Liquid 15 milliLiter(s) Swish and Spit two times a day  chlorhexidine 2% Cloths 1 Application(s) Topical <User Schedule>  cloNIDine 0.3 milliGRAM(s) Oral every 8 hours  clotrimazole 1% Cream 1 Application(s) Topical two times a day  dextrose 50% Injectable 25 Gram(s) IV Push once  dextrose 50% Injectable 12.5 Gram(s) IV Push once  dextrose 50% Injectable 25 Gram(s) IV Push once  furosemide Solution 40 milliGRAM(s) Oral every 12 hours  gabapentin 300 milliGRAM(s) Oral every 8 hours  glucagon  Injectable 1 milliGRAM(s) IntraMuscular once  insulin lispro (ADMELOG) corrective regimen sliding scale   SubCutaneous three times a day before meals  magnesium sulfate  IVPB 1 Gram(s) IV Intermittent once  melatonin 3 milliGRAM(s) Oral at bedtime  multivitamin/minerals/iron Oral Solution (CENTRUM) 15 milliLiter(s) Oral daily  pantoprazole   Suspension 40 milliGRAM(s) Oral daily  polyethylene glycol 3350 17 Gram(s) Oral daily  potassium chloride   Powder 40 milliEquivalent(s) Oral once  QUEtiapine 25 milliGRAM(s) Oral at bedtime  senna Syrup 5 milliLiter(s) Oral at bedtime    MEDICATIONS  (PRN):  acetaminophen   Oral Liquid .. 650 milliGRAM(s) Oral every 6 hours PRN Temp greater or equal to 38C (100.4F), Mild Pain (1 - 3), Moderate Pain (4 - 6)  HYDROmorphone  Injectable 0.5 milliGRAM(s) IV Push every 4 hours PRN Severe Pain (7 - 10)      LABS:                        7.4    6.91  )-----------( 351      ( 23 Mar 2025 07:38 )             25.0     03-23    135  |  88[L]  |  5[L]  ----------------------------<  116[H]  3.5   |  36[H]  |  0.77    Ca    8.7      23 Mar 2025 07:38  Phos  4.8     03-23  Mg     1.60     03-23        Urinalysis Basic - ( 23 Mar 2025 07:38 )    Color: x / Appearance: x / SG: x / pH: x  Gluc: 116 mg/dL / Ketone: x  / Bili: x / Urobili: x   Blood: x / Protein: x / Nitrite: x   Leuk Esterase: x / RBC: x / WBC x   Sq Epi: x / Non Sq Epi: x / Bacteria: x        Venous Blood Gas:  03-23 @ 07:38  7.40/69/90/43/97.6  VBG Lactate: 0.7

## 2025-03-23 NOTE — PROGRESS NOTE ADULT - NS ATTEND AMEND GEN_ALL_CORE FT
as above:  40 yo M w/ class III obesity, pAfib (no AC), HTN, BEA on CPAP, history of b/l papilledema attributed to pseudotumor cerebri who was transferred from [facility] on 2/26 for VV ECMO 2/2 ARDS. TTE/ROBERT with RV failure and was diuresed. s/p treatment of pneumonia. Now s/p trach and peg. ECMO catheter decannulated 3/7. 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       Plan:     Resp- ARDS s/p VV ecmo. Currently trach dependent, C/W nebs/airway clearance. wean to TC and use PMV for communication. Patient did well  trail on 3/19. Repeat  today   - Continue vent at night given patient most likely has OHS. Will need to trial of NIV for the patient while on trach   - OOB, ambulate as tolerated, PT  FEN/CV- C/W oral diuresis.   GI- PO Diet as tolerated   Neuro- c/w Seroquel 25mg QHS for insomnia   ID- Patient found to be fungemic with candida albicans in 2/4 bottles from blood cx from 3/15  - Most like source is the patient's panus, which on CT showed panniculitis  - wound care team consulted     - TTE negative for vegetation   - c/w Caspo     - f/u repeat blood cx; NGTD on 3/18  - Appreciate ID input   - DVT noted on repeat duplex of the right IJ and bilateral peroneal veins. C/W full ac on Eliquis   Cards- Hx of afib, [id number] of 2. c/w full AC                     ****neuropathy--gabapentin in place, ? sheep skin socks  - Dispo- full code. PT. OOB to chair. Plan for acute rehab  Roderick Ko MD-Pulmonary   [phone]

## 2025-03-24 LAB
-  FLUCONAZOLE: SIGNIFICANT CHANGE UP
ANION GAP SERPL CALC-SCNC: 10 MMOL/L — SIGNIFICANT CHANGE UP (ref 7–14)
BUN SERPL-MCNC: 5 MG/DL — LOW (ref 7–23)
CALCIUM SERPL-MCNC: 9 MG/DL — SIGNIFICANT CHANGE UP (ref 8.4–10.5)
CHLORIDE SERPL-SCNC: 91 MMOL/L — LOW (ref 98–107)
CO2 SERPL-SCNC: 35 MMOL/L — HIGH (ref 22–31)
CREAT SERPL-MCNC: 0.8 MG/DL — SIGNIFICANT CHANGE UP (ref 0.5–1.3)
CULTURE RESULTS: ABNORMAL
EGFR: 117 ML/MIN/1.73M2 — SIGNIFICANT CHANGE UP
EGFR: 117 ML/MIN/1.73M2 — SIGNIFICANT CHANGE UP
GAS PNL BLDV: SIGNIFICANT CHANGE UP
GLUCOSE BLDC GLUCOMTR-MCNC: 106 MG/DL — HIGH (ref 70–99)
GLUCOSE BLDC GLUCOMTR-MCNC: 118 MG/DL — HIGH (ref 70–99)
GLUCOSE BLDC GLUCOMTR-MCNC: 124 MG/DL — HIGH (ref 70–99)
GLUCOSE BLDC GLUCOMTR-MCNC: 128 MG/DL — HIGH (ref 70–99)
GLUCOSE SERPL-MCNC: 124 MG/DL — HIGH (ref 70–99)
HCT VFR BLD CALC: 25.3 % — LOW (ref 39–50)
HGB BLD-MCNC: 7.6 G/DL — LOW (ref 13–17)
MAGNESIUM SERPL-MCNC: 1.7 MG/DL — SIGNIFICANT CHANGE UP (ref 1.6–2.6)
MCHC RBC-ENTMCNC: 26.8 PG — LOW (ref 27–34)
MCHC RBC-ENTMCNC: 30 G/DL — LOW (ref 32–36)
MCV RBC AUTO: 89.1 FL — SIGNIFICANT CHANGE UP (ref 80–100)
METHOD TYPE: SIGNIFICANT CHANGE UP
NRBC # BLD AUTO: 0 K/UL — SIGNIFICANT CHANGE UP (ref 0–0)
NRBC # FLD: 0 K/UL — SIGNIFICANT CHANGE UP (ref 0–0)
NRBC BLD AUTO-RTO: 0 /100 WBCS — SIGNIFICANT CHANGE UP (ref 0–0)
ORGANISM # SPEC MICROSCOPIC CNT: ABNORMAL
PHOSPHATE SERPL-MCNC: 5.1 MG/DL — HIGH (ref 2.5–4.5)
PLATELET # BLD AUTO: 429 K/UL — HIGH (ref 150–400)
POTASSIUM SERPL-MCNC: 3.4 MMOL/L — LOW (ref 3.5–5.3)
POTASSIUM SERPL-SCNC: 3.4 MMOL/L — LOW (ref 3.5–5.3)
RBC # BLD: 2.84 M/UL — LOW (ref 4.2–5.8)
RBC # FLD: 24 % — HIGH (ref 10.3–14.5)
SODIUM SERPL-SCNC: 136 MMOL/L — SIGNIFICANT CHANGE UP (ref 135–145)
SPECIMEN SOURCE: SIGNIFICANT CHANGE UP
WBC # BLD: 7.14 K/UL — SIGNIFICANT CHANGE UP (ref 3.8–10.5)
WBC # FLD AUTO: 7.14 K/UL — SIGNIFICANT CHANGE UP (ref 3.8–10.5)

## 2025-03-24 PROCEDURE — 99232 SBSQ HOSP IP/OBS MODERATE 35: CPT

## 2025-03-24 PROCEDURE — G0545: CPT

## 2025-03-24 PROCEDURE — 99233 SBSQ HOSP IP/OBS HIGH 50: CPT

## 2025-03-24 RX ORDER — MAGNESIUM SULFATE 500 MG/ML
1 SYRINGE (ML) INJECTION ONCE
Refills: 0 | Status: COMPLETED | OUTPATIENT
Start: 2025-03-24 | End: 2025-03-24

## 2025-03-24 RX ORDER — ACETAMINOPHEN 500 MG/5ML
650 LIQUID (ML) ORAL EVERY 6 HOURS
Refills: 0 | Status: DISCONTINUED | OUTPATIENT
Start: 2025-03-24 | End: 2025-03-28

## 2025-03-24 RX ORDER — FUROSEMIDE 10 MG/ML
40 INJECTION INTRAMUSCULAR; INTRAVENOUS
Refills: 0 | Status: DISCONTINUED | OUTPATIENT
Start: 2025-03-24 | End: 2025-04-07

## 2025-03-24 RX ADMIN — IPRATROPIUM BROMIDE AND ALBUTEROL SULFATE 3 MILLILITER(S): .5; 2.5 SOLUTION RESPIRATORY (INHALATION) at 03:22

## 2025-03-24 RX ADMIN — IPRATROPIUM BROMIDE AND ALBUTEROL SULFATE 3 MILLILITER(S): .5; 2.5 SOLUTION RESPIRATORY (INHALATION) at 08:41

## 2025-03-24 RX ADMIN — POLYETHYLENE GLYCOL 3350 17 GRAM(S): 17 POWDER, FOR SOLUTION ORAL at 11:05

## 2025-03-24 RX ADMIN — Medication 1 APPLICATION(S): at 05:13

## 2025-03-24 RX ADMIN — GABAPENTIN 300 MILLIGRAM(S): 400 CAPSULE ORAL at 13:36

## 2025-03-24 RX ADMIN — Medication 0.5 MILLIGRAM(S): at 22:29

## 2025-03-24 RX ADMIN — Medication 0.5 MILLIGRAM(S): at 04:31

## 2025-03-24 RX ADMIN — CLOTRIMAZOLE 1 APPLICATION(S): 1 CREAM TOPICAL at 05:15

## 2025-03-24 RX ADMIN — Medication 15 MILLILITER(S): at 11:07

## 2025-03-24 RX ADMIN — Medication 0.5 MILLIGRAM(S): at 02:00

## 2025-03-24 RX ADMIN — Medication 650 MILLIGRAM(S): at 11:07

## 2025-03-24 RX ADMIN — GABAPENTIN 300 MILLIGRAM(S): 400 CAPSULE ORAL at 05:13

## 2025-03-24 RX ADMIN — Medication 15 MILLILITER(S): at 18:01

## 2025-03-24 RX ADMIN — Medication 100 GRAM(S): at 08:38

## 2025-03-24 RX ADMIN — Medication 40 MILLIGRAM(S): at 11:07

## 2025-03-24 RX ADMIN — Medication 0.5 MILLIGRAM(S): at 13:35

## 2025-03-24 RX ADMIN — APIXABAN 5 MILLIGRAM(S): 2.5 TABLET, FILM COATED ORAL at 05:14

## 2025-03-24 RX ADMIN — GABAPENTIN 300 MILLIGRAM(S): 400 CAPSULE ORAL at 22:18

## 2025-03-24 RX ADMIN — Medication 0.5 MILLIGRAM(S): at 01:39

## 2025-03-24 RX ADMIN — Medication 0.5 MILLIGRAM(S): at 00:00

## 2025-03-24 RX ADMIN — FUROSEMIDE 40 MILLIGRAM(S): 10 INJECTION INTRAMUSCULAR; INTRAVENOUS at 05:13

## 2025-03-24 RX ADMIN — Medication 15 MILLILITER(S): at 05:14

## 2025-03-24 RX ADMIN — Medication 0.5 MILLIGRAM(S): at 09:30

## 2025-03-24 RX ADMIN — Medication 650 MILLIGRAM(S): at 12:00

## 2025-03-24 RX ADMIN — Medication 40 MILLIEQUIVALENT(S): at 11:05

## 2025-03-24 RX ADMIN — CLOTRIMAZOLE 1 APPLICATION(S): 1 CREAM TOPICAL at 18:02

## 2025-03-24 RX ADMIN — Medication 0.3 MILLIGRAM(S): at 13:36

## 2025-03-24 RX ADMIN — Medication 0.5 MILLIGRAM(S): at 08:38

## 2025-03-24 RX ADMIN — Medication 0.3 MILLIGRAM(S): at 22:18

## 2025-03-24 RX ADMIN — AMLODIPINE BESYLATE 10 MILLIGRAM(S): 10 TABLET ORAL at 05:13

## 2025-03-24 RX ADMIN — IPRATROPIUM BROMIDE AND ALBUTEROL SULFATE 3 MILLILITER(S): .5; 2.5 SOLUTION RESPIRATORY (INHALATION) at 16:01

## 2025-03-24 RX ADMIN — FUROSEMIDE 40 MILLIGRAM(S): 10 INJECTION INTRAMUSCULAR; INTRAVENOUS at 18:05

## 2025-03-24 RX ADMIN — Medication 650 MILLIGRAM(S): at 18:05

## 2025-03-24 RX ADMIN — Medication 0.3 MILLIGRAM(S): at 05:14

## 2025-03-24 RX ADMIN — Medication 0.5 MILLIGRAM(S): at 14:03

## 2025-03-24 RX ADMIN — Medication 5 MILLILITER(S): at 22:28

## 2025-03-24 RX ADMIN — QUETIAPINE FUMARATE 25 MILLIGRAM(S): 25 TABLET ORAL at 22:28

## 2025-03-24 RX ADMIN — Medication 0.5 MILLIGRAM(S): at 19:55

## 2025-03-24 RX ADMIN — CASPOFUNGIN ACETATE 260 MILLIGRAM(S): 5 INJECTION, POWDER, LYOPHILIZED, FOR SOLUTION INTRAVENOUS at 04:32

## 2025-03-24 RX ADMIN — Medication 0.5 MILLIGRAM(S): at 05:00

## 2025-03-24 RX ADMIN — Medication 3 MILLIGRAM(S): at 22:19

## 2025-03-24 RX ADMIN — APIXABAN 5 MILLIGRAM(S): 2.5 TABLET, FILM COATED ORAL at 18:00

## 2025-03-24 RX ADMIN — IPRATROPIUM BROMIDE AND ALBUTEROL SULFATE 3 MILLILITER(S): .5; 2.5 SOLUTION RESPIRATORY (INHALATION) at 20:56

## 2025-03-24 NOTE — PROVIDER CONTACT NOTE (CRITICAL VALUE NOTIFICATION) - SITUATION
Received a call regarding a PCO2 of 75 from [facility]
Pt on vent cpap overnight
Blood culture, 3/15, aerobic yeast white cells
PCO2: 74
PCO2 76
critical lab result
positive blood culture result

## 2025-03-24 NOTE — CHART NOTE - NSCHARTNOTEFT_GEN_A_CORE
NUTRITION FOLLOW UP NOTE    Pt seen for length of stay follow-up    SOURCE: [] Patient [] Medical record [] RN/PCA [] Family/Caregiver []Patient unavailable []Patient inappropriate (disoriented, nonverbal, intubated/sedated) [] Other:    Medical Course: 40 yo M w/ class III obesity, pAfib (no AC), HTN, BEA (on CPAP), history of b/l papilledema attributed to pseudotumor cerebri s/p LP in 2024 with very high opening pressure, who is transferred for VV ECMO for ARDS and severe hypoxia. Patient initially presented to [place] on 2/24 for SOB and b/l LE edema; initially admitted to [place] ED. Patient found to be reportedly hypoxemic to 70% on RA with worsening respiratory status/secretions and somnolence in the ED. Patient was intubated with difficulty (7 attempts) due to very large tongue secretions. Despite optimal vent management, patient remained hypoxemic. Unable to prone due to obesity. Patient cannulated for VV ECMO on 2/25 and transferred to [facility] for further management. on 2/26. While at [facility], tx with diuresis and ABX, and ultimately decannulated and s/p 60XLTCP trach and PEG on 3/7. Patient ultimately transferred to [facility] on 3/11 and course complicated by recurrent high grade fevers and found with fungemia.      Diet Prescription: Soft and Bite sized.     Nutrition Course: Pt noted s/p 60XLTCP trach and PEG on 3/7, under pulm team management; CTSx removed tracheostomy and ECMO sutures on 3/17  trials continue and now trialing overnight tonight 3/24 with BIPAP for OHS noted per chart review. Pt now transitioned off from tubefeeding to PO diet following 3/20 Passed FEEST - on soft bite sized with thin liquids. Per flowsheet review intake range 26-50% to 51-75% over 3-20 to 3-24.       Pertinent Medications: MEDICATIONS  (STANDING):  albuterol/ipratropium for Nebulization 3 milliLiter(s) Nebulizer every 6 hours  amLODIPine   Tablet 10 milliGRAM(s) Oral daily  apixaban 5 milliGRAM(s) Oral every 12 hours  caspofungin IVPB 70 milliGRAM(s) IV Intermittent every 24 hours  chlorhexidine 0.12% Liquid 15 milliLiter(s) Swish and Spit two times a day  chlorhexidine 2% Cloths 1 Application(s) Topical <User Schedule>  cloNIDine 0.3 milliGRAM(s) Oral every 8 hours  clotrimazole 1% Cream 1 Application(s) Topical two times a day  dextrose 50% Injectable 25 Gram(s) IV Push once  dextrose 50% Injectable 12.5 Gram(s) IV Push once  dextrose 50% Injectable 25 Gram(s) IV Push once  furosemide Solution 40 milliGRAM(s) Oral every 12 hours  gabapentin 300 milliGRAM(s) Oral every 8 hours  glucagon  Injectable 1 milliGRAM(s) IntraMuscular once  insulin lispro (ADMELOG) corrective regimen sliding scale   SubCutaneous three times a day before meals  melatonin 3 milliGRAM(s) Oral at bedtime  multivitamin/minerals/iron Oral Solution (CENTRUM) 15 milliLiter(s) Oral daily  pantoprazole   Suspension 40 milliGRAM(s) Oral daily  polyethylene glycol 3350 17 Gram(s) Oral daily  QUEtiapine 25 milliGRAM(s) Oral at bedtime  senna Syrup 5 milliLiter(s) Oral at bedtime    MEDICATIONS  (PRN):  acetaminophen   Oral Liquid .. 650 milliGRAM(s) Oral every 6 hours PRN Temp greater or equal to 38C (100.4F), Mild Pain (1 - 3), Moderate Pain (4 - 6)  HYDROmorphone  Injectable 0.5 milliGRAM(s) IV Push every 4 hours PRN Severe Pain (7 - 10)    [] Relevant notes on medications:     Pertinent Labs: 03-24 Na136 mmol/L Glu 124 mg/dL[H] K+ 3.4 mmol/L[L] Cr  0.80 mg/dL BUN 5 mg/dL[L] 03-24 Phos 5.1 mg/dL[H] 03-08 Chol --    LDL --    HDL --    Trig 169 mg/dL[H]     A1C with Estimated Average Glucose Result: 6.7 % (02-25-25 @ 05:44)     CAPILLARY BLOOD GLUCOSE  POCT Blood Glucose.: 124 mg/dL (24 Mar 2025 11:07)  POCT Blood Glucose.: 128 mg/dL (24 Mar 2025 07:33)  POCT Blood Glucose.: 111 mg/dL (23 Mar 2025 21:45)  POCT Blood Glucose.: 106 mg/dL (23 Mar 2025 16:58)  POCT Blood Glucose.: 102 mg/dL (23 Mar 2025 13:27)      Weight Daily wts (kg) 202.1 (3/23) 199.2 (3/16) 208.6 (3/10) 214.4 (3/7) 229.2 (3/1), 233.9 (2-28)   Dosing wt (kg) 240.8 (2/26), 201 (3/10)  Weight Assessment:   Height: 67 in /170.2 cm  IBW: 148 lbs / 67.2kg +/-10%  BMI: kg/m^2    Physical Assessment, per flowsheets:  Edema: no edema noted  Pressure Injury: suspected deep tissue injury on sacrum noted      Estimated Needs:   [X] No change since previous assessment, based on IBW 67kg   22-25 KCals/kg = 5758-5164 KCals/d  1.5-2.0g protein/kg = 100-134g protein/d      Previous Nutrition Diagnosis:   [X] Unintended Weight Loss   Nutrition Diagnosis is [ ] ongoing      New Nutrition Diagnosis: [ ] not applicable     Education:  [ ] Provided pt with verbal / written education on   [ ] Provided on previous assessment by RD  [ ] Not applicable secondary to   [ ] Pt refused     Interventions:   1)   2)  3)   [] Current medical condition precludes nutrition intervention at this time.     Monitor & Evaluate:  PO intake, tolerance to diet/supplement, nutrition related lab values, weight trends, BMs/GI distress, hydration status, skin integrity.    Geno Limon, MS, RD, CDN, CNSC pg [phone]  Also available on [employer] Teams NUTRITION FOLLOW UP NOTE    Pt seen for length of stay follow-up    SOURCE: [] Patient [] Medical record [] RN/PCA [] Family/Caregiver []Patient unavailable []Patient inappropriate (disoriented, nonverbal, intubated/sedated) [] Other:    Medical Course: 38 yo M w/ class III obesity, pAfib (no AC), HTN, BEA (on CPAP), history of b/l papilledema attributed to pseudotumor cerebri s/p LP in 2024 with very high opening pressure, who is transferred for VV ECMO for ARDS and severe hypoxia. Patient initially presented to [place] on 2/24 for SOB and b/l LE edema; initially admitted to [place] ED. Patient found to be reportedly hypoxemic to 70% on RA with worsening respiratory status/secretions and somnolence in the ED. Patient was intubated with difficulty (7 attempts) due to very large tongue secretions. Despite optimal vent management, patient remained hypoxemic. Unable to prone due to obesity. Patient cannulated for VV ECMO on 2/25 and transferred to [facility] for further management. on 2/26. While at [facility], tx with diuresis and ABX, and ultimately decannulated and s/p 60XLTCP trach and PEG on 3/7. Patient ultimately transferred to [facility] on 3/11 and course complicated by recurrent high grade fevers and found with fungemia.      Diet Prescription: Soft and Bite sized.     Nutrition Course: Pt noted s/p 60XLTCP trach and PEG on 3/7, under pulm team management; CTSx removed tracheostomy and ECMO sutures on 3/17  trials continue and now trialing overnight tonight 3/24 with BIPAP for OHS noted per chart review. Pt now transitioned off from tubefeeding to PO diet following 3/20 Passed FEEST - on soft bite sized with thin liquids. Per flowsheet review intake range 26-50% to 51-75% over 3-20 to 3-24. Per discussion with patient, intake varies depending on meal. Trach collar in place Denied any difficulty with chewing or swallowing on current diet texture and consistency. Denied concerns for N/V/D or abdominal pain at this time. Last BM reported yesterday per patient. RD reviewed soft and bite sized texture and components of heart healthy diet, encouraged diet rich in protein and fiber with patient. Pt would benefit from receiving oral nutrition supplement      Pertinent Medications: MEDICATIONS  (STANDING):  albuterol/ipratropium for Nebulization 3 milliLiter(s) Nebulizer every 6 hours  amLODIPine   Tablet 10 milliGRAM(s) Oral daily  apixaban 5 milliGRAM(s) Oral every 12 hours  caspofungin IVPB 70 milliGRAM(s) IV Intermittent every 24 hours  chlorhexidine 0.12% Liquid 15 milliLiter(s) Swish and Spit two times a day  chlorhexidine 2% Cloths 1 Application(s) Topical <User Schedule>  cloNIDine 0.3 milliGRAM(s) Oral every 8 hours  clotrimazole 1% Cream 1 Application(s) Topical two times a day  dextrose 50% Injectable 25 Gram(s) IV Push once  dextrose 50% Injectable 12.5 Gram(s) IV Push once  dextrose 50% Injectable 25 Gram(s) IV Push once  furosemide Solution 40 milliGRAM(s) Oral every 12 hours  gabapentin 300 milliGRAM(s) Oral every 8 hours  glucagon  Injectable 1 milliGRAM(s) IntraMuscular once  insulin lispro (ADMELOG) corrective regimen sliding scale   SubCutaneous three times a day before meals  melatonin 3 milliGRAM(s) Oral at bedtime  multivitamin/minerals/iron Oral Solution (CENTRUM) 15 milliLiter(s) Oral daily  pantoprazole   Suspension 40 milliGRAM(s) Oral daily  polyethylene glycol 3350 17 Gram(s) Oral daily  QUEtiapine 25 milliGRAM(s) Oral at bedtime  senna Syrup 5 milliLiter(s) Oral at bedtime    MEDICATIONS  (PRN):  acetaminophen   Oral Liquid .. 650 milliGRAM(s) Oral every 6 hours PRN Temp greater or equal to 38C (100.4F), Mild Pain (1 - 3), Moderate Pain (4 - 6)  HYDROmorphone  Injectable 0.5 milliGRAM(s) IV Push every 4 hours PRN Severe Pain (7 - 10)    [] Relevant notes on medications:     Pertinent Labs: 03-24 Na136 mmol/L Glu 124 mg/dL[H] K+ 3.4 mmol/L[L] Cr  0.80 mg/dL BUN 5 mg/dL[L] 03-24 Phos 5.1 mg/dL[H] 03-08 Chol --    LDL --    HDL --    Trig 169 mg/dL[H]     A1C with Estimated Average Glucose Result: 6.7 % (02-25-25 @ 05:44)     CAPILLARY BLOOD GLUCOSE  POCT Blood Glucose.: 124 mg/dL (24 Mar 2025 11:07)  POCT Blood Glucose.: 128 mg/dL (24 Mar 2025 07:33)  POCT Blood Glucose.: 111 mg/dL (23 Mar 2025 21:45)  POCT Blood Glucose.: 106 mg/dL (23 Mar 2025 16:58)  POCT Blood Glucose.: 102 mg/dL (23 Mar 2025 13:27)      Weights:  Daily wts (kg) 202.1 (3/23) 199.2 (3/16) 208.6 (3/10) 214.4 (3/7) 229.2 (3/1), 233.9 (2-28)   Dosing wts (kg) 240.8 (2/26), 201 (3/10)  Weight Assessment: -3.1% wt loss over 2 weeks (based on 3-10 to 3/23 daily wt), may be wt scale discrepancy vs diuresis during admit  Height: 67 in /170.2 cm  IBW: 148 lbs / 67.2kg +/-10%  BMI: 69.4 kg/m^2 (based on dosing wt 3/10)    Physical Assessment, per flowsheets:  Edema: no edema noted  Pressure Injury: suspected deep tissue injury on sacrum noted      Estimated Needs:   [X] No change since previous assessment, based on IBW 67kg   22-25 KCals/kg = 2577-0451 KCals/d  1.5-2.0g protein/kg = 100-134g protein/d      Previous Nutrition Diagnosis:   [X] Unintended Weight Loss   Nutrition Diagnosis is [X] ongoing      New Nutrition Diagnosis: [X] not applicable     Education:  [X] Provided pt with verbal education as noted above.        Interventions:   1) Recommend DASH/TLC diet modifier (soft bitesized thin liquids per SLP recs) w. Ensure Max (150 kcal, 30g pro) BID to meet estimated protein requirements  2) Please monitor % PO intake in nursing flowsheets to assess adequacy of nutritional intake; nutrition to follow.   3) Continue to provide multivitamin w/ minerals once daily for micronutrient and mineral provisions   4) RD to f/u prn     Monitor & Evaluate:  PO intake, tolerance to diet/supplement, nutrition related lab values, weight trends, BMs/GI distress, hydration status, skin integrity.    Geno Limon, MS, RD, CDN, CNSC pg [phone]  Also available on [employer] Teams

## 2025-03-24 NOTE — PROGRESS NOTE ADULT - ASSESSMENT
40 YO M with PMHx of morbid obesity, BEA on CPAP, pAFIB (not on AC), HTN, and BL papilledema attributed to pseudotumor cerebri who presented initially to [facility] on 2/24 with SOB and BL LE edema. Found with URI outpatient with progressive SOB, and concern for noted for MFPNA on imaging. Course progressed with AHRF with worsening O2 demand, respiratory distress, secretions, and somnolence requiring intubation (difficult with success after 6 attempts) in ED and admission to [facility] MICU. While in MICU, patient noted with increasing O2 demand with no improvement despite optimal vent management. Concern noted for progressive ARDS, unable to prone given morbid obesity, and ultimately cannulated for vvECMO on 2/25 and transferred to [facility] for further management on 2/26. While at [facility], tx with diuresis and ABX, and ultimately decannulated and s/p 60XLTCP trach and PEG on 3/7. Patient ultimately transferred to [facility] on 3/11 and course complicated by recurrent high grade fevers and found with fungemia.    NEUROLOGY  # Hx of Pseudotumor Cerebri   - s/p LP in 2024 with high opening pressure  - s/p MRI 2024 with possible pituitary mass but unclear because of pressure effect from pseudotumor cerebri causing partially empty sella.  - Prolactin elevated   - ACTH low but recieved steroids during admission   - FT4 low normal and TSH normal, but given FT4 low normal TSH should be higher   - Discuss endocrine consult for inhouse work up vs outpatient.     # Sedation   - Weaned off sedation in ICU   - Nimbex turned off 2/27   - Prop turned off 3/9   - Precedex turned off and catapres started 3/11  - Continue on catapres 0.3 TID (3/16 - )     # Insomnia   - Patient worked night shift x 15 years   - Trouble sleeping at night leading to day time sleepiness and poor participation with PT  - Continue on seroquel 25 QHS (increased 3/18)   - Continue on melatonin   - Continue on neurontin as below    # BL LE neuropathy   - CIPN concern   - Continue on neurontin 100 BID and 200 QHS  - Monitor pain-Still with episodes of severe pain - Dilaudid prn dose increased   - Neurontin increased to 300mg q 8H    CARDIOVASCULAR  # HTN   - HTN noted in ICU requiring cardene and esmolol GTTs   - Lisinopril dc'ed to increase catapres   - Continue on norvasc 10 and catapres 0.3 TID   - Monitor BP     # AFIB   - Hx of pAFIB   - No RVR episodes or pAFIB episodes in ICU   - No rate control medications needed  - Monitor on telemetry     # RV dilation second to PHTN   - POCUS on arrival to [facility] with RVE concern   - TTE 10/2024 with EF 55-60 with normal LVSF, moderate LVH and normal RVSF and size with no concern for pHTN   - TTE on 2/25 at  with EF 61 and normal LVSF, but enlarged RV size with reduced RVSF, mild TR, mild PHTN with PASP 49, and dilated IVC to 3.4cn, but normal TAPSE 2.1.  - Continue on aggressive diuresis in ICU    - TTE 3/11 with RVSF reduced with TAPSE 1.6. IVC improved to 2.12cm.   - Continue on lasix 40 PO BID   - Monitor IO    RESPIRATORY  # ARDS second to MFPNA   - Hx of BEA on CPAP presented to  post URI with SOB and found with AHRF with progression to ARDS second to post viral MFPNA   - Intubated 2/25   - Cannulated for vvECMO 2/26   - s/p 60XLTCP tracheostomy 3/7   - Decannulated 3/7   - CTSx removed tracheostomy and ECMO sutures on 3/17   - Continue on TC QD with PMV as able with strong phonation  - Continue on PS 18/10/40 QHS given OHS   - Continue on nebs and HTS   -  trials continue     GI  # Dysphagia   - s/p PEG 3/7   - Attempted green dye on 3/13 and failed  - Continue on TF  - Continue on miralax and senna  - RPT green dye passed 3/19  - 3/20 Passed FEEST - on soft bite sized with thin liquids     # Mild transaminitis   - LFTs elevated in ICU with TBILI elevated likely from ECMO hemolysis   - US ABD with hepatic steatosis   - Trend LFTs      RENAL  # ELLA   - ELLA likely from cardiorenal with RVE   - Diuresed in ICU and improved   - Monitor renal function and UOP     # Hypernatremia   - Hypernatremia with fever spikes   - Fever curve improved and attempting to wean FWF   - Continue on  Q6H (decreased 3/19)    INFECTIOUS DISEASE  # Recurrent fevers with fungemia from unknown source   - Recurrent fevers post ECMO decannulation thought initially to be cytokine surge   - BCx, SCx, UCx, RVP and MRSA RPT on 3/12 negative   - Completed zosyn (3/12-3/18) empirically with improved WBC , however recurrent fevers noted.   - RPT SCx, UA, CXR and RVP negative  - TTE 3/17 with no IE  - PanCT 3/17 with PNA and persistent panniculitis   - BCx 3/15 and 3/16 with candida albicans.   - Concern for possible source from panniculitis   - Continue on caspo (3/17 - ) and RPT BCx 3/18 negative  - ID following  - plan for 2 week course after negative BCx to 4/1    # MFPNA and abdominal pannus cellulitis   - Presented to  post URI with SOB and found with AHRF with progression to ARDS second to post viral MFPNA   - RVP, legionella, strept, BAL, BCx, UCx, HIV, PCP, and adenovirus PCR negative   - Initially started on multiple agents in ICU and ultimatelty completed zosyn (2/26-3/9) ZMAX (2/25-2/26) and vanco (2/26-3/1)     # Penile discharge   - GC/ chlamydia negative   - HIV negative   - Monitor for now    # PPX   - MRSA PCR positive and completed bactroban (2/26-3/3)     HEME   # Anemia likely second to ECMO circuit vs AOCD   - HH low in ICU second to ECMO circuit   - HH dropping again today however no bleeding noted   - Microcytic and hemochromic, sent anemia panel 3/19  - Trend HH     VASCULAR   # R IJ and BL LE DVTs   - Post ECMO dopplers on 3/9 negative for BL UE/ LE DVTs.   - Subsequent post ECMO dopplers performed on 3/14 and noted with acute, non-occlusive deep vein thrombosis noted within the right internal jugular vein. acute, occlusive deep vein thromboses noted within the right and left peroneal veins at both mid calves, and age-indeterminate, occlusive deep vein thrombosis visualized within the left gastrocnemius vein at the proximal calf.     - Continue on argatroban GTT and change to eliquis     PODIATRY   # BL plantar feet blisters likely pressors induced  - Seen by podiatry and blisters lanced on 3/4 and again on 3/18  - Continue on local wound care per podiatry   - Podiatry following  - OK for WBAT will fu with PT for offloading shoe if appropriate    ENDOCRINE  # Pre-DM2   - A1C 6.7  - Continue on ISS   - Monitor FS   - IF no demand then stop ISS     LINES/ TUBES  - R IJ and R FEM ECMO (2/26-3/7)     SKIN  # Pannus canndial intertrigo   # Sacrum/ buttock MAD  - Continue on clotrimazole cream   - WOC appreciated     ETHICS/ GOC    - FULL CODE     DISPO - PT and PMR recc FLORENCE, however will continue to reassess for acute Seen by PMR and goal for acute rehab     38 YO M with PMHx of morbid obesity, BEA on CPAP, pAFIB (not on AC), HTN, and BL papilledema attributed to pseudotumor cerebri who presented initially to [facility] on 2/24 with SOB and BL LE edema. Found with URI outpatient with progressive SOB, and concern for noted for MFPNA on imaging. Course progressed with AHRF with worsening O2 demand, respiratory distress, secretions, and somnolence requiring intubation (difficult with success after 6 attempts) in ED and admission to [facility] MICU. While in MICU, patient noted with increasing O2 demand with no improvement despite optimal vent management. Concern noted for progressive ARDS, unable to prone given morbid obesity, and ultimately cannulated for vvECMO on 2/25 and transferred to [facility] for further management on 2/26. While at [facility], tx with diuresis and ABX, and ultimately decannulated and s/p 60XLTCP trach and PEG on 3/7. Patient ultimately transferred to [facility]CU on 3/11 and course complicated by recurrent high grade fevers and found with fungemia.    NEUROLOGY  # Hx of Pseudotumor Cerebri   - s/p LP in 2024 with high opening pressure  - s/p MRI 2024 with possible pituitary mass but unclear because of pressure effect from pseudotumor cerebri causing partially empty sella.  - Prolactin elevated   - ACTH low but recieved steroids during admission   - FT4 low normal and TSH normal, but given FT4 low normal TSH should be higher   - Discuss endocrine consult for inhouse work up vs outpatient.     # Sedation   - Weaned off sedation in ICU   - Nimbex turned off 2/27   - Prop turned off 3/9   - Precedex turned off and catapres started 3/11  - Continue on catapres 0.3 TID (3/16 - )     # Insomnia   - Patient worked night shift x 15 years   - Trouble sleeping at night leading to day time sleepiness and poor participation with PT  - Continue on seroquel 25 QHS (increased 3/18)   - Continue on melatonin   - Continue on neurontin as below    # BL LE neuropathy   - CIPN concern   - Continue on neurontin 100 BID and 200 QHS  - Monitor pain-Still with episodes of severe pain - Dilaudid prn dose increased   - Neurontin increased to 300mg q 8H    CARDIOVASCULAR  # HTN   - HTN noted in ICU requiring cardene and esmolol GTTs   - Lisinopril dc'ed to increase catapres   - Continue on norvasc 10 and catapres 0.3 TID   - Monitor BP     # AFIB   - Hx of pAFIB   - No RVR episodes or pAFIB episodes in ICU   - No rate control medications needed  - Monitor on telemetry     # RV dilation second to PHTN   - POCUS on arrival to [facility] with RVE concern   - TTE 10/2024 with EF 55-60 with normal LVSF, moderate LVH and normal RVSF and size with no concern for pHTN   - TTE on 2/25 at  with EF 61 and normal LVSF, but enlarged RV size with reduced RVSF, mild TR, mild PHTN with PASP 49, and dilated IVC to 3.4cn, but normal TAPSE 2.1.  - Continue on aggressive diuresis in ICU    - TTE 3/11 with RVSF reduced with TAPSE 1.6. IVC improved to 2.12cm.   - Continue on lasix 40 PO BID   - Monitor IO    RESPIRATORY  # ARDS second to MFPNA   - Hx of BEA on CPAP presented to  post URI with SOB and found with AHRF with progression to ARDS second to post viral MFPNA   - Intubated 2/25   - Cannulated for vvECMO 2/26   - s/p 60XLTCP tracheostomy 3/7   - Decannulated 3/7   - CTSx removed tracheostomy and ECMO sutures on 3/17   - Continue on TC QD with PMV as able with strong phonation  - Continue on PS 18/10/40 QHS given OHS   - Continue on nebs and HTS   -  trials continue and now trialing overnight tonight 3/24 with BIPAP for OHS     GI  # Dysphagia   - s/p PEG 3/7   - Attempted green dye on 3/13 and failed  - Continue on TF  - Continue on miralax and senna  - RPT green dye passed 3/19  - 3/20 Passed FEEST - on soft bite sized with thin liquids     # Mild transaminitis   - LFTs elevated in ICU with TBILI elevated likely from ECMO hemolysis   - US ABD with hepatic steatosis   - Trend LFTs      RENAL  # ELLA   - ELLA likely from cardiorenal with RVE   - Diuresed in ICU and improved   - Monitor renal function and UOP     # Hypernatremia   - Hypernatremia with fever spikes   - Fever curve improved and attempting to wean FWF   - Continue on  Q6H (decreased 3/19)    INFECTIOUS DISEASE  # Recurrent fevers with fungemia from unknown source   - Recurrent fevers post ECMO decannulation thought initially to be cytokine surge   - BCx, SCx, UCx, RVP and MRSA RPT on 3/12 negative   - Completed zosyn (3/12-3/18) empirically with improved WBC , however recurrent fevers noted.   - RPT SCx, UA, CXR and RVP negative  - TTE 3/17 with no IE  - PanCT 3/17 with PNA and persistent panniculitis   - BCx 3/15 and 3/16 with candida albicans.   - Concern for possible source from panniculitis   - Continue on caspo (3/17 - ) and RPT BCx 3/18 negative  - ID following  - plan for 2 week course after negative BCx to 4/2    # MFPNA and abdominal pannus cellulitis   - Presented to  post URI with SOB and found with AHRF with progression to ARDS second to post viral MFPNA   - RVP, legionella, strept, BAL, BCx, UCx, HIV, PCP, and adenovirus PCR negative   - Initially started on multiple agents in ICU and ultimatelty completed zosyn (2/26-3/9) ZMAX (2/25-2/26) and vanco (2/26-3/1)     # Penile discharge   - GC/ chlamydia negative   - HIV negative   - Monitor for now    # PPX   - MRSA PCR positive and completed bactroban (2/26-3/3)     HEME   # Anemia likely second to ECMO circuit vs AOCD   - HH low in ICU second to ECMO circuit   - HH dropping again today however no bleeding noted   - Microcytic and hemochromic, sent anemia panel 3/19  - Trend HH     VASCULAR   # R IJ and BL LE DVTs   - Post ECMO dopplers on 3/9 negative for BL UE/ LE DVTs.   - Subsequent post ECMO dopplers performed on 3/14 and noted with acute, non-occlusive deep vein thrombosis noted within the right internal jugular vein. acute, occlusive deep vein thromboses noted within the right and left peroneal veins at both mid calves, and age-indeterminate, occlusive deep vein thrombosis visualized within the left gastrocnemius vein at the proximal calf.     - Continue on argatroban GTT and change to eliquis     PODIATRY   # BL plantar feet blisters likely pressors induced  - Seen by podiatry and blisters lanced on 3/4 and again on 3/18  - Continue on local wound care per podiatry   - Podiatry following  - OK for WBAT will fu with PT for offloading shoe if appropriate    ENDOCRINE  # Pre-DM2   - A1C 6.7  - Continue on ISS   - Monitor FS   - IF no demand then stop ISS     LINES/ TUBES  - R IJ and R FEM ECMO (2/26-3/7)     SKIN  # Pannus canndial intertrigo   # Sacrum/ buttock MAD  - Continue on clotrimazole cream   - WOC appreciated     ETHICS/ GOC    - FULL CODE     DISPO - PT and PMR recc FLORENCE, however will continue to reassess for acute Seen by PMR and goal for acute rehab

## 2025-03-24 NOTE — PROGRESS NOTE ADULT - SUBJECTIVE AND OBJECTIVE BOX
Infectious Diseases Follow Up:    Patient is a 39y old  Male who presents with a chief complaint of sob (02 Mar 2025 06:19)      Interval History/ROS:  No acute events, afebrile ON. Pt w/o acute complaints     Allergies  No Known Allergies        ANTIMICROBIALS:  caspofungin IVPB 70 every 24 hours      Current Abx:     Previous Abx     OTHER MEDS:  MEDICATIONS  (STANDING):  acetaminophen   Oral Liquid .. 650 every 6 hours PRN  albuterol/ipratropium for Nebulization 3 every 6 hours  amLODIPine   Tablet 10 daily  apixaban 5 every 12 hours  cloNIDine 0.3 every 8 hours  dextrose 50% Injectable 25 once  dextrose 50% Injectable 12.5 once  dextrose 50% Injectable 25 once  furosemide Solution 40 every 12 hours  gabapentin 300 every 8 hours  glucagon  Injectable 1 once  HYDROmorphone  Injectable 0.5 every 4 hours PRN  insulin lispro (ADMELOG) corrective regimen sliding scale  three times a day before meals  melatonin 3 at bedtime  pantoprazole   Suspension 40 daily  polyethylene glycol 3350 17 daily  QUEtiapine 25 at bedtime  senna Syrup 5 at bedtime      Vital Signs Last 24 Hrs  T(C): 36.7 (24 Mar 2025 08:00), Max: 38.2 (23 Mar 2025 22:34)  T(F): 98.1 (24 Mar 2025 08:00), Max: 100.7 (23 Mar 2025 22:34)  HR: 111 (24 Mar 2025 08:42) (94 - 121)  BP: 133/79 (24 Mar 2025 08:00) (119/75 - 136/75)  BP(mean): --  RR: 18 (24 Mar 2025 08:42) (16 - 27)  SpO2: 100% (24 Mar 2025 08:42) (95% - 100%)    Parameters below as of 24 Mar 2025 08:42  Patient On (Oxygen Delivery Method): nasal cannula  O2 Flow (L/min): 4      PHYSICAL EXAM:  GENERAL: NAD  HEAD:  Atraumatic, Normocephalic  EYES: EOMI, conjunctiva and sclera clear  NECK: +trach with speaking valve   CHEST/LUNG: Coarse breath sounds  HEART: RRR  ABDOMEN: Soft, Nontender, Nondistended;  PSYCH: AAOx3                            7.6    7.14  )-----------( 429      ( 24 Mar 2025 07:12 )             25.3       03-24    136  |  91[L]  |  5[L]  ----------------------------<  124[H]  3.4[L]   |  35[H]  |  0.80    Ca    9.0      24 Mar 2025 07:12  Phos  5.1     03-24  Mg     1.70     03-24        Urinalysis Basic - ( 24 Mar 2025 07:12 )    Color: x / Appearance: x / SG: x / pH: x  Gluc: 124 mg/dL / Ketone: x  / Bili: x / Urobili: x   Blood: x / Protein: x / Nitrite: x   Leuk Esterase: x / RBC: x / WBC x   Sq Epi: x / Non Sq Epi: x / Bacteria: x        MICROBIOLOGY:  v  Trach Asp Tracheal Aspirate  03-21-25   Commensal kyle consistent with body site  --    Few polymorphonuclear leukocytes per low power field  No Squamous epithelial cells per low power field  No organisms seen per oil power field      Blood Blood-Peripheral  03-20-25   No growth at 72 Hours  --  --      Blood Blood-Venous  03-20-25   No growth at 72 Hours  --  --      Blood Blood-Peripheral  03-18-25   No growth at 5 days  --  Blood Culture PCR      Nares/Axilla/Groin Nares/Axilla/Groin  03-17-25   Culture NEGATIVE for Candida auris.  This surveillance culture is intended for Infection Control purposes only.  A negative result does not preclude the carriage of other fungal  organisms.  --  --      Blood Blood-Venous  03-16-25   Growth in aerobic bottle: Candida albicans  See previous culture [id number]  --    Growth in aerobic bottle: Yeast like cells      Blood Blood-Peripheral  03-16-25   Growth in aerobic bottle: Candida albicans  See previous culture [id number]  --    Growth in aerobic bottle: Yeast like cells      Trach Asp Tracheal Aspirate  03-16-25   Commensal kyle consistent with body site  --    Few polymorphonuclear leukocytes per low power field  Rare Squamous epithelial cells per low power field  Rare Gram Positive Rods seen per oil power field  Rare Gram Negative Rods seen per oil power field      Blood Blood-Venous  03-15-25   Growth in aerobic bottle: Candida albicans  See previous culture [id number]  --    Growth in aerobic bottle: Yeast like cells      Blood Blood-Peripheral  03-15-25   Growth in aerobic bottle: Candida albicans  Direct identification is available within approximately 3-5  hours either by Blood Panel Multiplexed PCR or Direct  MALDI-TOF. Details: [email or web address]  --  Blood Culture PCR  Candida albicans      Catheterized Catheterized  03-12-25   <10,000 CFU/mL Normal Urogenital Kyle  --  --      Blood Blood-Peripheral  03-11-25   No growth at 5 days  --  --      Blood Blood-Peripheral  03-11-25   No growth at 5 days  --  --      Trach Asp Tracheal Aspirate  03-10-25   Commensal kyle consistent with body site  --    Few polymorphonuclear leukocytes per low power field  No Squamous epithelial cells per low power field  No organisms seen per oil power field      Bronchial Bronchial Lavage  03-05-25   No growth  --    Rare polymorphonuclear leukocytes per low power field  No squamous epithelial cells per low power field  No organisms seen per oil power field      Bronchial Bronchial Lavage  03-01-25   No growth to date  --    Few polymorphonuclear leukocytes per low power field  No Squamous epithelial cells per low power field  No organisms seen per oil power field      Bronchial Bronchial Lavage  02-26-25   No growth  --    Moderate polymorphonuclear leukocytes seen per low power field  No squamous epithelial cells seen per low power field  No organisms seen per oil power field      .Blood Blood  02-26-25   No growth at 5 days  --  --      Catheterized Catheterized  02-26-25   No growth  --  --      Legionella SPUTUM  02-25-25   No Legionella species isolated  --  --      .Sputum Sputum  02-25-25   Commensal kyle consistent with body site  --    Moderate polymorphonuclear leukocytes per low power field  No Squamous epithelial cells per low power field  Rare Gram Positive Cocci in Pairs and Chains per oil power field  Rare Gram Positive Rods per oil power field      Catheterized Catheterized  02-25-25   <10,000 CFU/mL Normal Urogenital Kyle  --  --      .Blood Blood-Peripheral  02-24-25   No growth at 5 days  --  --                RADIOLOGY:

## 2025-03-24 NOTE — PROVIDER CONTACT NOTE (CRITICAL VALUE NOTIFICATION) - ASSESSMENT
No distress noted, mental status at baseline
Patient on mechanical ventilation PS 18/10
Blood culture, 3/15, aerobic yeast white cells
No acute distress or mental status change
Pt febrile last night and in AM during my shift, no acute distress noted
No acute distress noted, mental status at baseline
patient in no acute distress

## 2025-03-24 NOTE — PROGRESS NOTE ADULT - SUBJECTIVE AND OBJECTIVE BOX
CHIEF COMPLAINT: Patient is a 39y old  Male who presents with a chief complaint of sob (02 Mar 2025 06:19)      INTERVAL EVENTS:   - temp 100.7 overnight  - continued on IV Caspo    ROS: Seen by bedside during AM rounds     OBJECTIVE:  ICU Vital Signs Last 24 Hrs  T(C): 36.6 (24 Mar 2025 04:00), Max: 38.2 (23 Mar 2025 22:34)  T(F): 97.9 (24 Mar 2025 04:00), Max: 100.7 (23 Mar 2025 22:34)  HR: 106 (24 Mar 2025 04:00) (94 - 121)  BP: 132/83 (24 Mar 2025 04:00) (119/75 - 136/75)  BP(mean): --  ABP: --  ABP(mean): --  RR: 16 (24 Mar 2025 04:00) (16 - 27)  SpO2: 98% (24 Mar 2025 04:00) (95% - 100%)    O2 Parameters below as of 24 Mar 2025 04:00  Patient On (Oxygen Delivery Method): nasal cannula w/ humidification          Mode: [facility]    03-23 @ 07:01  -  03-24 @ 07:00  --------------------------------------------------------  IN: 6000 mL / OUT: 68438 mL / NET: -4300 mL      CAPILLARY BLOOD GLUCOSE      POCT Blood Glucose.: 128 mg/dL (24 Mar 2025 07:33)      PHYSICAL EXAM:  General:   HEENT:   Lymph Nodes:  Neck:   Respiratory:   Cardiovascular:   Abdomen:   Extremities:   Skin:   Neurological:  Psychiatry:    Mode: [facility] MEDICATIONS:  MEDICATIONS  (STANDING):  albuterol/ipratropium for Nebulization 3 milliLiter(s) Nebulizer every 6 hours  amLODIPine   Tablet 10 milliGRAM(s) Oral daily  apixaban 5 milliGRAM(s) Oral every 12 hours  caspofungin IVPB 70 milliGRAM(s) IV Intermittent every 24 hours  chlorhexidine 0.12% Liquid 15 milliLiter(s) Swish and Spit two times a day  chlorhexidine 2% Cloths 1 Application(s) Topical <User Schedule>  cloNIDine 0.3 milliGRAM(s) Oral every 8 hours  clotrimazole 1% Cream 1 Application(s) Topical two times a day  dextrose 50% Injectable 25 Gram(s) IV Push once  dextrose 50% Injectable 12.5 Gram(s) IV Push once  dextrose 50% Injectable 25 Gram(s) IV Push once  furosemide Solution 40 milliGRAM(s) Oral every 12 hours  gabapentin 300 milliGRAM(s) Oral every 8 hours  glucagon  Injectable 1 milliGRAM(s) IntraMuscular once  insulin lispro (ADMELOG) corrective regimen sliding scale   SubCutaneous three times a day before meals  melatonin 3 milliGRAM(s) Oral at bedtime  multivitamin/minerals/iron Oral Solution (CENTRUM) 15 milliLiter(s) Oral daily  pantoprazole   Suspension 40 milliGRAM(s) Oral daily  polyethylene glycol 3350 17 Gram(s) Oral daily  QUEtiapine 25 milliGRAM(s) Oral at bedtime  senna Syrup 5 milliLiter(s) Oral at bedtime    MEDICATIONS  (PRN):  acetaminophen   Oral Liquid .. 650 milliGRAM(s) Oral every 6 hours PRN Temp greater or equal to 38C (100.4F), Mild Pain (1 - 3), Moderate Pain (4 - 6)  HYDROmorphone  Injectable 0.5 milliGRAM(s) IV Push every 4 hours PRN Severe Pain (7 - 10)      LABS:                        7.6    7.14  )-----------( 429      ( 24 Mar 2025 07:12 )             25.3     03-23    135  |  88[L]  |  5[L]  ----------------------------<  116[H]  3.5   |  36[H]  |  0.77    Ca    8.7      23 Mar 2025 07:38  Phos  4.8     03-23  Mg     1.60     03-23        Urinalysis Basic - ( 23 Mar 2025 07:38 )    Color: x / Appearance: x / SG: x / pH: x  Gluc: 116 mg/dL / Ketone: x  / Bili: x / Urobili: x   Blood: x / Protein: x / Nitrite: x   Leuk Esterase: x / RBC: x / WBC x   Sq Epi: x / Non Sq Epi: x / Bacteria: x        Venous Blood Gas:  03-24 @ 07:12  7.43/69/96/46/98.8  VBG Lactate: 0.8  Venous Blood Gas:  03-23 @ 07:38  7.40/69/90/43/97.6  VBG Lactate: 0.7   CHIEF COMPLAINT: Patient is a 39y old  Male who presents with a chief complaint of sob (02 Mar 2025 06:19)      INTERVAL EVENTS:   - temp 100.7 overnight  - continued on IV Caspo    ROS: Seen by bedside during AM rounds, endorses ongoing neuropathic pain at b/l LE     OBJECTIVE:  ICU Vital Signs Last 24 Hrs  T(C): 36.6 (24 Mar 2025 04:00), Max: 38.2 (23 Mar 2025 22:34)  T(F): 97.9 (24 Mar 2025 04:00), Max: 100.7 (23 Mar 2025 22:34)  HR: 106 (24 Mar 2025 04:00) (94 - 121)  BP: 132/83 (24 Mar 2025 04:00) (119/75 - 136/75)  BP(mean): --  ABP: --  ABP(mean): --  RR: 16 (24 Mar 2025 04:00) (16 - 27)  SpO2: 98% (24 Mar 2025 04:00) (95% - 100%)    O2 Parameters below as of 24 Mar 2025 04:00  Patient On (Oxygen Delivery Method): nasal cannula w/ humidification          Mode: [facility]    03-23 @ 07:01  -  03-24 @ 07:00  --------------------------------------------------------  IN: 6000 mL / OUT: 50125 mL / NET: -4300 mL      CAPILLARY BLOOD GLUCOSE      POCT Blood Glucose.: 128 mg/dL (24 Mar 2025 07:33)      PHYSICAL EXAM:  General: NAD  HEENT: +TC with , able to phonate  Respiratory: lungs CTA b/l, no rales/rhonchi or wheeze, normal respiratory effort  Cardiovascular: +sinus tachycardia  Abdomen: soft, non tender, non distended, +PEG  Extremities: b/l LE in c/d/i dressings  Skin: warm, dry  Neurological: AAO x 4, awake and alert, follows commands, no gross focal deficits, moving extremities freely   Psychiatry: normal affect and behavior    Mode: [facility] MEDICATIONS:  MEDICATIONS  (STANDING):  albuterol/ipratropium for Nebulization 3 milliLiter(s) Nebulizer every 6 hours  amLODIPine   Tablet 10 milliGRAM(s) Oral daily  apixaban 5 milliGRAM(s) Oral every 12 hours  caspofungin IVPB 70 milliGRAM(s) IV Intermittent every 24 hours  chlorhexidine 0.12% Liquid 15 milliLiter(s) Swish and Spit two times a day  chlorhexidine 2% Cloths 1 Application(s) Topical <User Schedule>  cloNIDine 0.3 milliGRAM(s) Oral every 8 hours  clotrimazole 1% Cream 1 Application(s) Topical two times a day  dextrose 50% Injectable 25 Gram(s) IV Push once  dextrose 50% Injectable 12.5 Gram(s) IV Push once  dextrose 50% Injectable 25 Gram(s) IV Push once  furosemide Solution 40 milliGRAM(s) Oral every 12 hours  gabapentin 300 milliGRAM(s) Oral every 8 hours  glucagon  Injectable 1 milliGRAM(s) IntraMuscular once  insulin lispro (ADMELOG) corrective regimen sliding scale   SubCutaneous three times a day before meals  melatonin 3 milliGRAM(s) Oral at bedtime  multivitamin/minerals/iron Oral Solution (CENTRUM) 15 milliLiter(s) Oral daily  pantoprazole   Suspension 40 milliGRAM(s) Oral daily  polyethylene glycol 3350 17 Gram(s) Oral daily  QUEtiapine 25 milliGRAM(s) Oral at bedtime  senna Syrup 5 milliLiter(s) Oral at bedtime    MEDICATIONS  (PRN):  acetaminophen   Oral Liquid .. 650 milliGRAM(s) Oral every 6 hours PRN Temp greater or equal to 38C (100.4F), Mild Pain (1 - 3), Moderate Pain (4 - 6)  HYDROmorphone  Injectable 0.5 milliGRAM(s) IV Push every 4 hours PRN Severe Pain (7 - 10)      LABS:                        7.6    7.14  )-----------( 429      ( 24 Mar 2025 07:12 )             25.3     03-23    135  |  88[L]  |  5[L]  ----------------------------<  116[H]  3.5   |  36[H]  |  0.77    Ca    8.7      23 Mar 2025 07:38  Phos  4.8     03-23  Mg     1.60     03-23        Urinalysis Basic - ( 23 Mar 2025 07:38 )    Color: x / Appearance: x / SG: x / pH: x  Gluc: 116 mg/dL / Ketone: x  / Bili: x / Urobili: x   Blood: x / Protein: x / Nitrite: x   Leuk Esterase: x / RBC: x / WBC x   Sq Epi: x / Non Sq Epi: x / Bacteria: x        Venous Blood Gas:  03-24 @ 07:12  7.43/69/96/46/98.8  VBG Lactate: 0.8  Venous Blood Gas:  03-23 @ 07:38  7.40/69/90/43/97.6  VBG Lactate: 0.7

## 2025-03-24 NOTE — PROGRESS NOTE ADULT - ASSESSMENT
This is a 38 y/o M w/ PMHx AF (not on AC), HTN, BEA, pseudotumor cerebri s/p LP in 2024, initially presented to [place] on 2/24, SOB, LE edema with URI symptoms and sick contacts, noted to have severe ARDS, intubated however still hypoxia, cannulated for VV ECMO on 2/25, transferred to [facility] on 2/25, started on empiric Vanco, Zosyn for multifocal PNA, abdominal wall cellulitis, s/p trach placement by CT surgery on 3/7, ECMO decannulated on 3/7. Zosyn held on 3/9.  C/b fevers on 3/10, restarted on Zosyn, transferred to [facility] on 3/13, Bcx now w/ yeast.    #Candida albicans fungemia   #Fevers   #Multifocal PNA, abdominal wall cellulitis s/p Vanco/Zosyn  #ARDS, hypoxic respiratory failure requiring VV EMCO 2/2 multifocal PNA? s/p decannulation on 3/7    Overall, 38 y/o M w/ PMHx AF (not on AC), HTN, BEA, pseudotumor cerebri s/p LP in 2024 admitted to [facility] on 2/26 for ARDS, hypoxic respiratory failure requiring VV EMCO 2/2 multifocal PNA? s/p decannulation on 3/7, c/b abdominal wall cellulitis s/p Vancomycin/Zosyn, s/p trach on 3/7, in the setting of persistent fevers despite Zosyn, BCx now w/ yeast, Candida albicans   Unclear source for yeast in BCx, pt had all central lines removed on 3/7, not getting TPN, no abdominal pain on exam, PEG site well appearing.     CT A/P w/o clear abdominal source of fungemia, possibly 2/2 abdominal wall cellulitis? TTE w/o IE.  BCx 3/18 1/2 positive, 3/20 NGTD x 2.     Recommendations;   1. Caspofungin 70 mg daily (given weight) (would avoid fluconazole for now given high dose needed). Would plan for 2 week course after negative BCx  (if 3/20 remains NG, then 4/2/25)  2. Repeat BCx on 3/20 NGTD  3. TTE wnl, possible ROBERT if not clearing      Thank you for consulting us and involving us in the management of this patient's case. In addition to reviewing history, imaging, documents, labs, microbiology, and infection control strategies and potential issues.     ID will continue to follow    Shailesh Owens M.D.  Attending Physician  Division of Infectious Diseases  Department of Medicine    Please contact through MS Teams message.  Office: [phone] (after 5 PM or weekend)

## 2025-03-24 NOTE — PROGRESS NOTE ADULT - SUBJECTIVE AND OBJECTIVE BOX
Patient is a 39y old  Male who presents with a chief complaint of sob (02 Mar 2025 06:19)      HPI:  40 yo M w/ class III obesity, pAfib (no AC), HTN, BEA (on CPAP), history of b/l papilledema attributed to pseudotumor cerebri s/p LP in 2024 with very high opening pressure, who is transferred for VV ECMO for ARDS and severe hypoxia. Patient initially presented to [place] on 2/24 for SOB and b/l LE edema. 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 He is a current every day smoker of Newports and marijuana, but does not drink or do other drugs. Currently lives with his mother. There is a long term partner in his life, but they are not , and they have an on/off relationship currently not very involved with each other as per the patient's mother and aunt.  At [facility] where he was getting an eval last year for shortness of breath, he had a sleep study and was diagnosed with sleep apnea, prescribed a PAP machine, which he has in his room but the mother is not sure how many nights per week he uses it. Recently, the last day or two, his mother and brother have been under the weather with URIs and the patient 2 days ago started to develop a ruynny nose, ough, some wheezing of the chest, as well as worsening shortness of breath and fevers at home. He called his aunt who told him to go get checked out and then he landed at the [place] ED. Patient found to be reportedly hypoxemic to 70% on RA with worsening respiratory status/secretions and somnolence in the ED. Patient was intubated with difficulty (7 attempts) due to very large tongue secretions. Despite optimal vent management, patient remained hypoxemic. Unable to prone due to obesity. Patient cannulated for VV ECMO on 2/25 and transferred to [facility] for further management.     [place] labs notable for MRSA PCR -, Fluvid -, urine legionella -, sputum gram stain  with GPC and GPR    CTA chest on 2/24 negative for pulmonary embolism, notable for patchy bilateral lower lobe opacities, hepatomegaly, mild ascites, edema/induration of the abdominal pannus.    LE duplex on 2/25 negative for DVT    TTE on 2/25 showed LV EF 61%, enlarged RV with TAPSE 2.1cm, ePASP at least 49 (Patient had 10/2024 TTE with normal LV and RV with ePASP 14).    Only home med is Lasix. Not on acetazolamide for pseudotumor or on Wegovy (was pending auth) (26 Feb 2025 01:48)    on schedule for PT today    REVIEW OF SYSTEMS  weakness    PAST MEDICAL & SURGICAL HISTORY  HTN (hypertension)    Atrial fibrillation    Papilledema of both eyes    Chronic sinusitis    Morbid obesity    No significant past surgical history       CURRENT FUNCTIONAL STATUS  3/21     Bed Mobility  Bed Mobility Training Rehab Potential: fair, will monitor progress closely  Bed Mobility Training Rolling/Turning: maximum assist (25% patient effort);  1 person assist  Bed Mobility Training Scooting: maximum assist (25% patient effort);  1 person assist  Bed Mobility Training Sit-to-Supine: maximum assist (25% patient effort);  1 person assist  Bed Mobility Training Supine-to-Sit: maximum assist (25% patient effort);  1 person assist  Bed Mobility Training Limitations: decreased ability to use arms for pushing/pulling;  decreased ability to use legs for bridging/pushing;  decreased strength         RECENT LABS/IMAGING  CBC Full  -  ( 24 Mar 2025 07:12 )  WBC Count : 7.14 K/uL  RBC Count : 2.84 M/uL  Hemoglobin : 7.6 g/dL  Hematocrit : 25.3 %  Platelet Count - Automated : 429 K/uL  Mean Cell Volume : 89.1 fL  Mean Cell Hemoglobin : 26.8 pg  Mean Cell Hemoglobin Concentration : 30.0 g/dL  Auto Neutrophil # : x  Auto Lymphocyte # : x  Auto Monocyte # : x  Auto Eosinophil # : x  Auto Basophil # : x  Auto Neutrophil % : x  Auto Lymphocyte % : x  Auto Monocyte % : x  Auto Eosinophil % : x  Auto Basophil % : x    03-24    136  |  91[L]  |  5[L]  ----------------------------<  124[H]  3.4[L]   |  35[H]  |  0.80    Ca    9.0      24 Mar 2025 07:12  Phos  5.1     03-24  Mg     1.70     03-24      Urinalysis Basic - ( 24 Mar 2025 07:12 )    Color: x / Appearance: x / SG: x / pH: x  Gluc: 124 mg/dL / Ketone: x  / Bili: x / Urobili: x   Blood: x / Protein: x / Nitrite: x   Leuk Esterase: x / RBC: x / WBC x   Sq Epi: x / Non Sq Epi: x / Bacteria: x        VITALS  T(C): 36.7 (03-24-25 @ 08:00), Max: 38.2 (03-23-25 @ 22:34)  HR: 109 (03-24-25 @ 11:30) (94 - 121)  BP: 133/79 (03-24-25 @ 08:00) (119/75 - 136/75)  RR: 18 (03-24-25 @ 08:42) (16 - 24)  SpO2: 99% (03-24-25 @ 11:30) (95% - 100%)  Wt(kg): --    ALLERGIES  No Known Allergies      MEDICATIONS   acetaminophen   Oral Liquid .. 650 milliGRAM(s) Oral every 6 hours PRN  albuterol/ipratropium for Nebulization 3 milliLiter(s) Nebulizer every 6 hours  amLODIPine   Tablet 10 milliGRAM(s) Oral daily  apixaban 5 milliGRAM(s) Oral every 12 hours  caspofungin IVPB 70 milliGRAM(s) IV Intermittent every 24 hours  chlorhexidine 0.12% Liquid 15 milliLiter(s) Swish and Spit two times a day  chlorhexidine 2% Cloths 1 Application(s) Topical <User Schedule>  cloNIDine 0.3 milliGRAM(s) Oral every 8 hours  clotrimazole 1% Cream 1 Application(s) Topical two times a day  dextrose 50% Injectable 25 Gram(s) IV Push once  dextrose 50% Injectable 12.5 Gram(s) IV Push once  dextrose 50% Injectable 25 Gram(s) IV Push once  furosemide Solution 40 milliGRAM(s) Oral every 12 hours  gabapentin 300 milliGRAM(s) Oral every 8 hours  glucagon  Injectable 1 milliGRAM(s) IntraMuscular once  HYDROmorphone  Injectable 0.5 milliGRAM(s) IV Push every 4 hours PRN  insulin lispro (ADMELOG) corrective regimen sliding scale   SubCutaneous three times a day before meals  melatonin 3 milliGRAM(s) Oral at bedtime  multivitamin/minerals/iron Oral Solution (CENTRUM) 15 milliLiter(s) Oral daily  pantoprazole   Suspension 40 milliGRAM(s) Oral daily  polyethylene glycol 3350 17 Gram(s) Oral daily  QUEtiapine 25 milliGRAM(s) Oral at bedtime  senna Syrup 5 milliLiter(s) Oral at bedtime      ----------------------------------------------------------------------------------------    PHYSICAL EXAM  Constitutional - NAD   HEENT - +  on trach  Chest - no respiratory distress  Cardiovascular -mild tachy  Abdomen -  Soft, NTND  Extremities - dressings b/l feet  Neurologic Exam -                    Cognitive - Awake, Alert, AAO to self, place, date, year, situation      Communication - fluent     Cranial Nerves - CN 2-12 intact     Motor -                     LEFT    UE - 4/5                    RIGHT UE - 4/5                    LEFT    LE -  2/5                     RIGHT LE -  1+/5        Sensory - Intact to LT       Balance - WNL Static  Psychiatric - Mood stable, Affect WNL  ----------------------------------------------------------------------------------------  ASSESSMENT/PLAN  40 yo m PMHx of morbid obesity, BEA on CPAP, pAFIB (not on AC), HTN, and BL papilledema attributed to pseudotumor cerebri who presented initially to [facility] on 2/24 with SOB and BL LE edema. Found with URI outpatient with progressive SOB, and concern for noted for MFPNA on imaging. Course progressed with AHRF with worsening O2 demand, respiratory distress, secretions, and somnolence requiring intubation (difficult with success after 6 attempts) in ED and admission to [facility] MICU. While in MICU, patient noted with increasing O2 demand with no improvement despite optimal vent management. Concern noted for progressive ARDS, unable to prone given morbid obesity, and ultimately cannulated for vvECMO on 2/25 and transferred to [facility] for further management on 2/26.   transferred to [facility] 3/11, course complicated by fever. afebrile past 48 hours  s/p trach and peg 3/7  cleared for soft bite size diet with thin liquids (must wear one way speaking valve with all meals)  continue bedside PT and OT    podiatry following for vasopressor associated wounds, receiving local wound care. wbat with offloading shoe  Pain - acetaminphen, gabapentin 300mg q8    DVT PPX - on eliquis  Rehab -  tolerated bedside therapy, goal is for acute rehab when medically cleared.       Total time spent to review relevant records and imaging results, examine patient, complete documentation, and when applicable discuss the case with the patient, family, , social workers, and medical team: 35  minutes

## 2025-03-24 NOTE — PROVIDER CONTACT NOTE (CRITICAL VALUE NOTIFICATION) - ACTION/TREATMENT ORDERED:
no interventions needed at this time
provider notified, continue to monitor
Continue cooling measures and give PRN tylenol as ordered. Continue treatment w/ IV abx & anti-fungal as ordered.
Provider aware
continue to monitor
Continue to monitor
No new orders given, vent cpap @ night

## 2025-03-24 NOTE — PROVIDER CONTACT NOTE (CRITICAL VALUE NOTIFICATION) - BACKGROUND
PMH of BEA, obesity, HTN
patient admitted for pneumonia
Admitted for PNA
PMH morbid obesity, HTN
PMH of morbid obesity, HTN, tracheostomy
PMH of obesity, BEA, HTN
htn, obesity, pna, sob

## 2025-03-24 NOTE — PROGRESS NOTE ADULT - NS ATTEND AMEND GEN_ALL_CORE FT
Pt is a 39M with MHx obesity (Class III, BMI 69), pAfib (no AC), HTN, BEA (dz'ed 2019, AHI 34 non-compliant on CPAP set at 10), and history of b/l papilledema attributed to pseudotumor cerebri initially presenting as a transfer [facility] on 2/26 for acute hypoxemic respiratory failure s/p ETT intubation/MV with progression to refractory ARDS s/p initiation of vv-ECMO support (2/26-3/7) with failure to wean from ventilator s/p tracheostomy placement, further found to have RV failure s/p aggressive diuresis and PNA followed by severe sepsis 2/2 panniculitis c/b candida albicans fungemia now transferred to [facility] for further medical management.     Pt now awake and alert, spontaneously communicative and at mental status baseline. Continues to have severe functional debility likely 2/2 prolonged critical illness polyneuropathy but is progressing well. Now able to transfer via [place] to chair daily and is independent of UE and fine motor function. Off all sedation. Initiate weaning of catapres today. Weaning pain control, pt remains dependent on Dilaudid prn for LE neuropathic pain. Adding PO regimen. Gabapentin increased.    Pt with acute combined hypoxemic and hypercapnic respiratory failure with failure to wean from ventilator s/p tracheostomy placement. Now tolerating TC QD, PSV (18/10) qhs. Will attempt to keept trach capped and used oral interface for qhs Bilevel support. Airway clearance therapy in place. Wean O2 supplementation for goal O2 saturation 90-95%. Aspiration precautions and oral hygiene in place. Trach care and suctioning as per [facility] routine. Pt phonating well, no issues with mucous expectoration or swallow impairment.     Pt initially p/w RV failure 2/2 pulmHTN now s/p aggressive diuresis with improvement. Now transitioned to PO diuretics with goal to maintain euvolemia. AFib well controlled conservatively, will continue to monitor on telemetry. TTE 2/25 with new RVE, 3/11 with RVSD. DVT noted on repeat duplex of the right IJ and bilateral peroneal veins. Will c/w full A/C on Eliquis for DVT and AFib.     Pt with oropharyngeal dysphagia s/p PEG placement (3/7) now transitioned to PO diet (3/20) via FEEST. Will continue to monitor on soft diet. Bowel regimen in place prn. PPI ppx. Transaminitis improving.     Pt further found to have severe sepsis 2/ candida albicans fungemia possibly from panniculitis (3/15, cleared 3/18) now on caspofungin with plan to complete 14 day course from clearance. TTE (-) endocarditis. Wound care team following. ID recs appreciated.     Dispo pending medical optimization. Pt full code. DVT ppx in place. Palliative consulted, recs appreciated. Will continue to update family and discuss plan of care throughout hospitalization.

## 2025-03-24 NOTE — PROVIDER CONTACT NOTE (CRITICAL VALUE NOTIFICATION) - RECOMMENDATIONS
contact provider
Vent cpap @ night
Continue pap therapy at night
Fever reducing measures
contact provider
continue cpap therapy at night
Provider made aware

## 2025-03-25 LAB
ANION GAP SERPL CALC-SCNC: 15 MMOL/L — HIGH (ref 7–14)
BUN SERPL-MCNC: 5 MG/DL — LOW (ref 7–23)
CALCIUM SERPL-MCNC: 9 MG/DL — SIGNIFICANT CHANGE UP (ref 8.4–10.5)
CHLORIDE SERPL-SCNC: 90 MMOL/L — LOW (ref 98–107)
CO2 SERPL-SCNC: 34 MMOL/L — HIGH (ref 22–31)
CREAT SERPL-MCNC: 0.89 MG/DL — SIGNIFICANT CHANGE UP (ref 0.5–1.3)
CULTURE RESULTS: SIGNIFICANT CHANGE UP
CULTURE RESULTS: SIGNIFICANT CHANGE UP
EGFR: 113 ML/MIN/1.73M2 — SIGNIFICANT CHANGE UP
EGFR: 113 ML/MIN/1.73M2 — SIGNIFICANT CHANGE UP
GAS PNL BLDV: SIGNIFICANT CHANGE UP
GLUCOSE BLDC GLUCOMTR-MCNC: 105 MG/DL — HIGH (ref 70–99)
GLUCOSE BLDC GLUCOMTR-MCNC: 108 MG/DL — HIGH (ref 70–99)
GLUCOSE BLDC GLUCOMTR-MCNC: 131 MG/DL — HIGH (ref 70–99)
GLUCOSE BLDC GLUCOMTR-MCNC: 96 MG/DL — SIGNIFICANT CHANGE UP (ref 70–99)
GLUCOSE SERPL-MCNC: 90 MG/DL — SIGNIFICANT CHANGE UP (ref 70–99)
HCT VFR BLD CALC: 23.7 % — LOW (ref 39–50)
HGB BLD-MCNC: 7.8 G/DL — LOW (ref 13–17)
MAGNESIUM SERPL-MCNC: 1.8 MG/DL — SIGNIFICANT CHANGE UP (ref 1.6–2.6)
MCHC RBC-ENTMCNC: 29.1 PG — SIGNIFICANT CHANGE UP (ref 27–34)
MCHC RBC-ENTMCNC: 32.9 G/DL — SIGNIFICANT CHANGE UP (ref 32–36)
MCV RBC AUTO: 88.4 FL — SIGNIFICANT CHANGE UP (ref 80–100)
NRBC # BLD AUTO: 0 K/UL — SIGNIFICANT CHANGE UP (ref 0–0)
NRBC # FLD: 0 K/UL — SIGNIFICANT CHANGE UP (ref 0–0)
NRBC BLD AUTO-RTO: 0 /100 WBCS — SIGNIFICANT CHANGE UP (ref 0–0)
PHOSPHATE SERPL-MCNC: 5.3 MG/DL — HIGH (ref 2.5–4.5)
PLATELET # BLD AUTO: 496 K/UL — HIGH (ref 150–400)
POTASSIUM SERPL-MCNC: 4.1 MMOL/L — SIGNIFICANT CHANGE UP (ref 3.5–5.3)
POTASSIUM SERPL-SCNC: 4.1 MMOL/L — SIGNIFICANT CHANGE UP (ref 3.5–5.3)
RBC # BLD: 2.68 M/UL — LOW (ref 4.2–5.8)
RBC # FLD: 24 % — HIGH (ref 10.3–14.5)
SODIUM SERPL-SCNC: 139 MMOL/L — SIGNIFICANT CHANGE UP (ref 135–145)
SPECIMEN SOURCE: SIGNIFICANT CHANGE UP
SPECIMEN SOURCE: SIGNIFICANT CHANGE UP
WBC # BLD: 8.6 K/UL — SIGNIFICANT CHANGE UP (ref 3.8–10.5)
WBC # FLD AUTO: 8.6 K/UL — SIGNIFICANT CHANGE UP (ref 3.8–10.5)

## 2025-03-25 PROCEDURE — 99232 SBSQ HOSP IP/OBS MODERATE 35: CPT

## 2025-03-25 PROCEDURE — 99233 SBSQ HOSP IP/OBS HIGH 50: CPT

## 2025-03-25 PROCEDURE — G0545: CPT

## 2025-03-25 RX ORDER — HYDROMORPHONE/SOD CHLOR,ISO/PF 2 MG/10 ML
0.5 SYRINGE (ML) INJECTION EVERY 6 HOURS
Refills: 0 | Status: DISCONTINUED | OUTPATIENT
Start: 2025-03-25 | End: 2025-03-26

## 2025-03-25 RX ADMIN — GABAPENTIN 300 MILLIGRAM(S): 400 CAPSULE ORAL at 05:01

## 2025-03-25 RX ADMIN — Medication 15 MILLILITER(S): at 16:10

## 2025-03-25 RX ADMIN — IPRATROPIUM BROMIDE AND ALBUTEROL SULFATE 3 MILLILITER(S): .5; 2.5 SOLUTION RESPIRATORY (INHALATION) at 08:24

## 2025-03-25 RX ADMIN — QUETIAPINE FUMARATE 25 MILLIGRAM(S): 25 TABLET ORAL at 21:42

## 2025-03-25 RX ADMIN — Medication 15 MILLILITER(S): at 11:22

## 2025-03-25 RX ADMIN — Medication 0.5 MILLIGRAM(S): at 12:41

## 2025-03-25 RX ADMIN — Medication 0.3 MILLIGRAM(S): at 05:02

## 2025-03-25 RX ADMIN — Medication 15 MILLILITER(S): at 05:02

## 2025-03-25 RX ADMIN — APIXABAN 5 MILLIGRAM(S): 2.5 TABLET, FILM COATED ORAL at 16:10

## 2025-03-25 RX ADMIN — Medication 5 MILLILITER(S): at 21:42

## 2025-03-25 RX ADMIN — FUROSEMIDE 40 MILLIGRAM(S): 10 INJECTION INTRAMUSCULAR; INTRAVENOUS at 05:01

## 2025-03-25 RX ADMIN — GABAPENTIN 300 MILLIGRAM(S): 400 CAPSULE ORAL at 11:21

## 2025-03-25 RX ADMIN — Medication 0.5 MILLIGRAM(S): at 19:59

## 2025-03-25 RX ADMIN — AMLODIPINE BESYLATE 10 MILLIGRAM(S): 10 TABLET ORAL at 05:02

## 2025-03-25 RX ADMIN — Medication 0.5 MILLIGRAM(S): at 04:53

## 2025-03-25 RX ADMIN — CLOTRIMAZOLE 1 APPLICATION(S): 1 CREAM TOPICAL at 05:03

## 2025-03-25 RX ADMIN — GABAPENTIN 300 MILLIGRAM(S): 400 CAPSULE ORAL at 21:41

## 2025-03-25 RX ADMIN — Medication 0.2 MILLIGRAM(S): at 21:41

## 2025-03-25 RX ADMIN — Medication 0.5 MILLIGRAM(S): at 13:11

## 2025-03-25 RX ADMIN — Medication 0.2 MILLIGRAM(S): at 14:02

## 2025-03-25 RX ADMIN — CLOTRIMAZOLE 1 APPLICATION(S): 1 CREAM TOPICAL at 16:09

## 2025-03-25 RX ADMIN — Medication 1 APPLICATION(S): at 05:03

## 2025-03-25 RX ADMIN — APIXABAN 5 MILLIGRAM(S): 2.5 TABLET, FILM COATED ORAL at 05:01

## 2025-03-25 RX ADMIN — IPRATROPIUM BROMIDE AND ALBUTEROL SULFATE 3 MILLILITER(S): .5; 2.5 SOLUTION RESPIRATORY (INHALATION) at 15:50

## 2025-03-25 RX ADMIN — Medication 3 MILLIGRAM(S): at 21:42

## 2025-03-25 RX ADMIN — FUROSEMIDE 40 MILLIGRAM(S): 10 INJECTION INTRAMUSCULAR; INTRAVENOUS at 11:22

## 2025-03-25 RX ADMIN — CASPOFUNGIN ACETATE 260 MILLIGRAM(S): 5 INJECTION, POWDER, LYOPHILIZED, FOR SOLUTION INTRAVENOUS at 04:52

## 2025-03-25 RX ADMIN — Medication 40 MILLIGRAM(S): at 08:09

## 2025-03-25 RX ADMIN — IPRATROPIUM BROMIDE AND ALBUTEROL SULFATE 3 MILLILITER(S): .5; 2.5 SOLUTION RESPIRATORY (INHALATION) at 20:51

## 2025-03-25 RX ADMIN — IPRATROPIUM BROMIDE AND ALBUTEROL SULFATE 3 MILLILITER(S): .5; 2.5 SOLUTION RESPIRATORY (INHALATION) at 03:21

## 2025-03-25 NOTE — PROVIDER CONTACT NOTE (CRITICAL VALUE NOTIFICATION) - TEST AND RESULT REPORTED:
Blood culture, 3/15, aerobic yeast white cells
PCO2 75 from VBG
VBG / Bicarb: 46 ; CO2: 48
VBG PCO2: 74
PCo2
pco2 79 tco2 45
Preliminary aerobic blood cx: positive for yeast lite cells
VBG PCO2: 45

## 2025-03-25 NOTE — PROGRESS NOTE ADULT - ASSESSMENT
40 YO M with PMHx of morbid obesity, BEA on CPAP, pAFIB (not on AC), HTN, and BL papilledema attributed to pseudotumor cerebri who presented initially to [facility] on 2/24 with SOB and BL LE edema. Found with URI outpatient with progressive SOB, and concern for noted for MFPNA on imaging. Course progressed with AHRF with worsening O2 demand, respiratory distress, secretions, and somnolence requiring intubation (difficult with success after 6 attempts) in ED and admission to [facility] MICU. While in MICU, patient noted with increasing O2 demand with no improvement despite optimal vent management. Concern noted for progressive ARDS, unable to prone given morbid obesity, and ultimately cannulated for vvECMO on 2/25 and transferred to [facility] for further management on 2/26. While at [facility], tx with diuresis and ABX, and ultimately decannulated and s/p 60XLTCP trach and PEG on 3/7. Patient ultimately transferred to RCU on 3/11 and course complicated by recurrent high grade fevers and found with fungemia.    NEUROLOGY  # Hx of Pseudotumor Cerebri   - s/p LP in 2024 with high opening pressure  - s/p MRI 2024 with possible pituitary mass but unclear because of pressure effect from pseudotumor cerebri causing partially empty sella.  - Prolactin elevated   - ACTH low but recieved steroids during admission   - FT4 low normal and TSH normal, but given FT4 low normal TSH should be higher   - Discuss endocrine consult for inhouse work up vs outpatient.     # Sedation   - Weaned off sedation in ICU   - Nimbex turned off 2/27   - Prop turned off 3/9   - Precedex turned off and catapres started 3/11  - Continue on catapres 0.3 TID (3/16 - )     # Insomnia   - Patient worked night shift x 15 years   - Trouble sleeping at night leading to day time sleepiness and poor participation with PT  - Continue on seroquel 25 QHS (increased 3/18)   - Continue on melatonin   - Continue on neurontin as below    # BL LE neuropathy   - CIPN concern   - Continue on neurontin 100 BID and 200 QHS  - Monitor pain-Still with episodes of severe pain - Dilaudid prn dose increased   - Neurontin increased to 300mg q 8H    CARDIOVASCULAR  # HTN   - HTN noted in ICU requiring cardene and esmolol GTTs   - Lisinopril dc'ed to increase catapres   - Continue on norvasc 10 and catapres 0.3 TID   - Monitor BP     # AFIB   - Hx of pAFIB   - No RVR episodes or pAFIB episodes in ICU   - No rate control medications needed  - Monitor on telemetry     # RV dilation second to PHTN   - POCUS on arrival to [facility] with RVE concern   - TTE 10/2024 with EF 55-60 with normal LVSF, moderate LVH and normal RVSF and size with no concern for pHTN   - TTE on 2/25 at  with EF 61 and normal LVSF, but enlarged RV size with reduced RVSF, mild TR, mild PHTN with PASP 49, and dilated IVC to 3.4cn, but normal TAPSE 2.1.  - Continue on aggressive diuresis in ICU    - TTE 3/11 with RVSF reduced with TAPSE 1.6. IVC improved to 2.12cm.   - Continue on lasix 40 PO BID   - Monitor IO    RESPIRATORY  # ARDS second to MFPNA   - Hx of BEA on CPAP presented to  post URI with SOB and found with AHRF with progression to ARDS second to post viral MFPNA   - Intubated 2/25   - Cannulated for vvECMO 2/26   - s/p 60XLTCP tracheostomy 3/7   - Decannulated 3/7   - CTSx removed tracheostomy and ECMO sutures on 3/17   - Continue on TC QD with PMV as able with strong phonation  - Continue on PS 18/10/40 QHS given OHS   - Continue on nebs and HTS   -  trials continue and now trialing overnight tonight 3/24 with BIPAP for OHS   - trend VBG    GI  # Dysphagia   - s/p PEG 3/7   - Attempted green dye on 3/13 and failed  - Continue on TF  - Continue on miralax and senna  - RPT green dye passed 3/19  - 3/20 Passed FEEST - on soft bite sized with thin liquids     # Mild transaminitis   - LFTs elevated in ICU with TBILI elevated likely from ECMO hemolysis   - US ABD with hepatic steatosis   - Trend LFTs      RENAL  # ELLA   - ELLA likely from cardiorenal with RVE   - Diuresed in ICU and improved   - Monitor renal function and UOP     # Hypernatremia   - Hypernatremia with fever spikes   - Fever curve improved and attempting to wean FWF   - Continue on  Q6H (decreased 3/19)    INFECTIOUS DISEASE  # Recurrent fevers with fungemia from unknown source   - Recurrent fevers post ECMO decannulation thought initially to be cytokine surge   - BCx, SCx, UCx, RVP and MRSA RPT on 3/12 negative   - Completed zosyn (3/12-3/18) empirically with improved WBC , however recurrent fevers noted.   - RPT SCx, UA, CXR and RVP negative  - TTE 3/17 with no IE  - PanCT 3/17 with PNA and persistent panniculitis   - BCx 3/15 and 3/16 with candida albicans.   - Concern for possible source from panniculitis   - Continue on caspo (3/17 - ) and RPT BCx 3/18 negative  - ID following  - plan for 2 week course after negative BCx to 4/2    # MFPNA and abdominal pannus cellulitis   - Presented to  post URI with SOB and found with AHRF with progression to ARDS second to post viral MFPNA   - RVP, legionella, strept, BAL, BCx, UCx, HIV, PCP, and adenovirus PCR negative   - Initially started on multiple agents in ICU and ultimatelty completed zosyn (2/26-3/9) ZMAX (2/25-2/26) and vanco (2/26-3/1)     # Penile discharge   - GC/ chlamydia negative   - HIV negative   - Monitor for now    # PPX   - MRSA PCR positive and completed bactroban (2/26-3/3)     HEME   # Anemia likely second to ECMO circuit vs AOCD   - HH low in ICU second to ECMO circuit   - HH dropping again today however no bleeding noted   - Microcytic and hemochromic, sent anemia panel 3/19  - Trend HH     VASCULAR   # R IJ and BL LE DVTs   - Post ECMO dopplers on 3/9 negative for BL UE/ LE DVTs.   - Subsequent post ECMO dopplers performed on 3/14 and noted with acute, non-occlusive deep vein thrombosis noted within the right internal jugular vein. acute, occlusive deep vein thromboses noted within the right and left peroneal veins at both mid calves, and age-indeterminate, occlusive deep vein thrombosis visualized within the left gastrocnemius vein at the proximal calf.     - Continue on argatroban GTT and change to eliquis     PODIATRY   # BL plantar feet blisters likely pressors induced  - Seen by podiatry and blisters lanced on 3/4 and again on 3/18  - Continue on local wound care per podiatry   - Podiatry following  - OK for WBAT will fu with PT for offloading shoe if appropriate    ENDOCRINE  # Pre-DM2   - A1C 6.7  - Continue on ISS   - Monitor FS   - IF no demand then stop ISS     LINES/ TUBES  - R IJ and R FEM ECMO (2/26-3/7)     SKIN  # Pannus canndial intertrigo   # Sacrum/ buttock MAD  - Continue on clotrimazole cream   - WOC appreciated     ETHICS/ GOC    - FULL CODE     DISPO - PT and PMR recc FLORENCE, however will continue to reassess for acute Seen by PMR and goal for acute rehab     38 YO M with PMHx of morbid obesity, BEA on CPAP, pAFIB (not on AC), HTN, and BL papilledema attributed to pseudotumor cerebri who presented initially to [facility] on 2/24 with SOB and BL LE edema. Found with URI outpatient with progressive SOB, and concern for noted for MFPNA on imaging. Course progressed with AHRF with worsening O2 demand, respiratory distress, secretions, and somnolence requiring intubation (difficult with success after 6 attempts) in ED and admission to [facility] MICU. While in MICU, patient noted with increasing O2 demand with no improvement despite optimal vent management. Concern noted for progressive ARDS, unable to prone given morbid obesity, and ultimately cannulated for vvECMO on 2/25 and transferred to [facility] for further management on 2/26. While at [facility], tx with diuresis and ABX, and ultimately decannulated and s/p 60XLTCP trach and PEG on 3/7. Patient ultimately transferred to RCU on 3/11 and course complicated by recurrent high grade fevers and found with fungemia.    NEUROLOGY  # Hx of Pseudotumor Cerebri   - s/p LP in 2024 with high opening pressure  - s/p MRI 2024 with possible pituitary mass but unclear because of pressure effect from pseudotumor cerebri causing partially empty sella.  - Prolactin elevated   - ACTH low but recieved steroids during admission   - FT4 low normal and TSH normal, but given FT4 low normal TSH should be higher   - Discuss endocrine consult for inhouse work up vs outpatient.     # Sedation   - Weaned off sedation in ICU   - Nimbex turned off 2/27   - Prop turned off 3/9   - Precedex turned off and catapres started 3/11  - Continue on catapres 0.2 TID (decreased 3/25) - taper    # Insomnia   - Patient worked night shift x 15 years   - Trouble sleeping at night leading to day time sleepiness and poor participation with PT  - Continue on Seroquel 25 QHS (increased 3/18)   - Continue on melatonin   - Continue on neurontin as below    # BL LE neuropathy   - CIPN concern   - Neurontin increased to 300mg Q8H with relief   - added Percocet Q4H for moderate pain (3/25) and continue on Dilaudid Q6H for severe pain     CARDIOVASCULAR  # HTN   - HTN noted in ICU requiring cardene and esmolol GTTs   - Lisinopril dc'ed to increase catapres   - Continue on norvasc 10 and catapres 0.3 TID   - Monitor BP     # AFIB   - Hx of pAFIB   - No RVR episodes or pAFIB episodes in ICU   - No rate control medications needed  - Monitor on telemetry     # RV dilation second to PHTN   - POCUS on arrival to [facility] with RVE concern   - TTE 10/2024 with EF 55-60 with normal LVSF, moderate LVH and normal RVSF and size with no concern for pHTN   - TTE on 2/25 at  with EF 61 and normal LVSF, but enlarged RV size with reduced RVSF, mild TR, mild PHTN with PASP 49, and dilated IVC to 3.4cn, but normal TAPSE 2.1.  - Continue on aggressive diuresis in ICU    - TTE 3/11 with RVSF reduced with TAPSE 1.6. IVC improved to 2.12cm.   - Continue on lasix 40 PO BID   - Monitor IO    RESPIRATORY  # ARDS second to MFPNA   - Hx of BEA on CPAP presented to  post URI with SOB and found with AHRF with progression to ARDS second to post viral MFPNA   - Intubated 2/25   - Cannulated for vvECMO 2/26   - s/p 60XLTCP tracheostomy 3/7   - Decannulated 3/7   - CTSx removed tracheostomy and ECMO sutures on 3/17   - Continue on TC QD with PMV as able with strong phonation  - Continue on PS 18/10/40 QHS given OHS   - Continue on nebs and HTS   -  trials continue and now trialing overnight with BIPAP for OHS (10/8/12)  - trend VBG QD    GI  # Dysphagia   - s/p PEG 3/7   - Attempted green dye on 3/13 and failed  - Continue on TF  - Continue on miralax and senna  - RPT green dye passed 3/19  - 3/20 Passed FEEST - on soft bite sized with thin liquids     # Mild transaminitis   - LFTs elevated in ICU with TBILI elevated likely from ECMO hemolysis   - US ABD with hepatic steatosis   - Trend LFTs      RENAL  # ELLA   - ELLA likely from cardiorenal with RVE   - Diuresed in ICU and improved   - Monitor renal function and UOP     # Hypernatremia   - Hypernatremia with fever spikes   - Fever curve improved and attempting to wean FWF   - Continue on  Q6H (decreased 3/19)    INFECTIOUS DISEASE  # Recurrent fevers with fungemia from unknown source   - Recurrent fevers post ECMO decannulation thought initially to be cytokine surge   - BCx, SCx, UCx, RVP and MRSA RPT on 3/12 negative   - Completed zosyn (3/12-3/18) empirically with improved WBC , however recurrent fevers noted.   - RPT SCx, UA, CXR and RVP negative  - TTE 3/17 with no IE  - PanCT 3/17 with PNA and persistent panniculitis   - BCx 3/15 and 3/16 with candida albicans.   - Concern for possible source from panniculitis   - Continue on caspo (3/17 - ) and RPT BCx 3/18 negative  - ID following  - plan for 2 week course after negative BCx to 4/2    # MFPNA and abdominal pannus cellulitis   - Presented to  post URI with SOB and found with AHRF with progression to ARDS second to post viral MFPNA   - RVP, legionella, strept, BAL, BCx, UCx, HIV, PCP, and adenovirus PCR negative   - Initially started on multiple agents in ICU and ultimatelty completed zosyn (2/26-3/9) ZMAX (2/25-2/26) and vanco (2/26-3/1)     # Penile discharge   - GC/ chlamydia negative   - HIV negative   - Monitor for now    # PPX   - MRSA PCR positive and completed bactroban (2/26-3/3)     HEME   # Anemia likely second to ECMO circuit vs AOCD   - HH low in ICU second to ECMO circuit   - HH dropping again today however no bleeding noted   - Microcytic and hemochromic, sent anemia panel 3/19  - Trend      VASCULAR   # R IJ and BL LE DVTs   - Post ECMO dopplers on 3/9 negative for BL UE/ LE DVTs.   - Subsequent post ECMO dopplers performed on 3/14 and noted with acute, non-occlusive deep vein thrombosis noted within the right internal jugular vein. acute, occlusive deep vein thromboses noted within the right and left peroneal veins at both mid calves, and age-indeterminate, occlusive deep vein thrombosis visualized within the left gastrocnemius vein at the proximal calf.     - Continue on argatroban GTT and change to eliquis     PODIATRY   # BL plantar feet blisters likely pressors induced  - Seen by podiatry and blisters lanced on 3/4 and again on 3/18  - Continue on local wound care per podiatry   - Podiatry following  - OK for WBAT will fu with PT for offloading shoe if appropriate    ENDOCRINE  # Pre-DM2   - A1C 6.7  - Continue on ISS   - Monitor FS   - IF no demand then stop ISS     LINES/ TUBES  - R IJ and R FEM ECMO (2/26-3/7)     SKIN  # Pannus canndial intertrigo   # Sacrum/ buttock MAD  - Continue on clotrimazole cream   - WOC appreciated     ETHICS/ GOC    - FULL CODE     DISPO - PT and PMR recc FLORENCE, however will continue to reassess for acute Seen by PMR and goal for acute rehab

## 2025-03-25 NOTE — PROGRESS NOTE ADULT - SUBJECTIVE AND OBJECTIVE BOX
Infectious Diseases Follow Up:    Patient is a 39y old  Male who presents with a chief complaint of sob (02 Mar 2025 06:19)      Interval History/ROS:  No acute events, pt afebrile. Trach capped on NC.   Pt w/o acute complaints     Allergies  No Known Allergies        ANTIMICROBIALS:  caspofungin IVPB 70 every 24 hours      Current Abx:     Previous Abx     OTHER MEDS:  MEDICATIONS  (STANDING):  acetaminophen     Tablet .. 650 every 6 hours PRN  albuterol/ipratropium for Nebulization 3 every 6 hours  amLODIPine   Tablet 10 daily  apixaban 5 every 12 hours  cloNIDine 0.2 every 8 hours  dextrose 50% Injectable 25 once  dextrose 50% Injectable 12.5 once  dextrose 50% Injectable 25 once  furosemide    Tablet 40 two times a day  gabapentin 300 every 8 hours  glucagon  Injectable 1 once  HYDROmorphone  Injectable 0.5 every 6 hours PRN  insulin lispro (ADMELOG) corrective regimen sliding scale  three times a day before meals  melatonin 3 at bedtime  oxycodone    5 mG/acetaminophen 325 mG 1 every 4 hours PRN  pantoprazole    Tablet 40 before breakfast  polyethylene glycol 3350 17 daily  QUEtiapine 25 at bedtime  senna Syrup 5 at bedtime      Vital Signs Last 24 Hrs  T(C): 37 (25 Mar 2025 08:00), Max: 37.2 (25 Mar 2025 04:00)  T(F): 98.6 (25 Mar 2025 08:00), Max: 98.9 (25 Mar 2025 04:00)  HR: 110 (25 Mar 2025 08:26) (88 - 111)  BP: 116/73 (25 Mar 2025 08:00) (103/83 - 128/78)  BP(mean): --  RR: 24 (25 Mar 2025 08:24) (16 - 24)  SpO2: 97% (25 Mar 2025 08:26) (97% - 100%)    Parameters below as of 25 Mar 2025 08:24  Patient On (Oxygen Delivery Method): nasal cannula  O2 Flow (L/min): 4      PHYSICAL EXAM:  GENERAL: NAD  HEAD:  Atraumatic, Normocephalic  EYES: EOMI, conjunctiva and sclera clear  NECK: +trach with speaking valve capped,    CHEST/LUNG: On NC, CTAB  HEART: RRR  ABDOMEN: Soft, Nontender, Nondistended;  PSYCH: AAOx3                            7.8    8.60  )-----------( 496      ( 25 Mar 2025 05:11 )             23.7       03-25    139  |  90[L]  |  5[L]  ----------------------------<  90  4.1   |  34[H]  |  0.89    Ca    9.0      25 Mar 2025 05:11  Phos  5.3     03-25  Mg     1.80     03-25        Urinalysis Basic - ( 25 Mar 2025 05:11 )    Color: x / Appearance: x / SG: x / pH: x  Gluc: 90 mg/dL / Ketone: x  / Bili: x / Urobili: x   Blood: x / Protein: x / Nitrite: x   Leuk Esterase: x / RBC: x / WBC x   Sq Epi: x / Non Sq Epi: x / Bacteria: x        MICROBIOLOGY:  v  Trach Asp Tracheal Aspirate  03-21-25   Commensal kyle consistent with body site  --    Few polymorphonuclear leukocytes per low power field  No Squamous epithelial cells per low power field  No organisms seen per oil power field      Blood Blood-Peripheral  03-20-25   No growth at 4 days  --  --      Blood Blood-Venous  03-20-25   No growth at 4 days  --  --      Blood Blood-Peripheral  03-18-25   No growth at 5 days  --  Blood Culture PCR  Candida albicans      Nares/Axilla/Groin Nares/Axilla/Groin  03-17-25   Culture NEGATIVE for Candida auris.  This surveillance culture is intended for Infection Control purposes only.  A negative result does not preclude the carriage of other fungal  organisms.  --  --      Blood Blood-Venous  03-16-25   Growth in aerobic bottle: Candida albicans  See previous culture [id number]  --    Growth in aerobic bottle: Yeast like cells      Blood Blood-Peripheral  03-16-25   Growth in aerobic bottle: Candida albicans  See previous culture [id number]  --    Growth in aerobic bottle: Yeast like cells      Trach Asp Tracheal Aspirate  03-16-25   Commensal kyle consistent with body site  --    Few polymorphonuclear leukocytes per low power field  Rare Squamous epithelial cells per low power field  Rare Gram Positive Rods seen per oil power field  Rare Gram Negative Rods seen per oil power field      Blood Blood-Venous  03-15-25   Growth in aerobic bottle: Candida albicans  See previous culture [id number]  --    Growth in aerobic bottle: Yeast like cells      Blood Blood-Peripheral  03-15-25   Growth in aerobic bottle: Candida albicans  Direct identification is available within approximately 3-5  hours either by Blood Panel Multiplexed PCR or Direct  MALDI-TOF. Details: [email or web address]  --  Blood Culture PCR  Candida albicans      Catheterized Catheterized  03-12-25   <10,000 CFU/mL Normal Urogenital Kyle  --  --      Blood Blood-Peripheral  03-11-25   No growth at 5 days  --  --      Blood Blood-Peripheral  03-11-25   No growth at 5 days  --  --      Trach Asp Tracheal Aspirate  03-10-25   Commensal kyle consistent with body site  --    Few polymorphonuclear leukocytes per low power field  No Squamous epithelial cells per low power field  No organisms seen per oil power field      Bronchial Bronchial Lavage  03-05-25   No growth  --    Rare polymorphonuclear leukocytes per low power field  No squamous epithelial cells per low power field  No organisms seen per oil power field      Bronchial Bronchial Lavage  03-01-25   No growth to date  --    Few polymorphonuclear leukocytes per low power field  No Squamous epithelial cells per low power field  No organisms seen per oil power field      Bronchial Bronchial Lavage  02-26-25   No growth  --    Moderate polymorphonuclear leukocytes seen per low power field  No squamous epithelial cells seen per low power field  No organisms seen per oil power field      .Blood Blood  02-26-25   No growth at 5 days  --  --      Catheterized Catheterized  02-26-25   No growth  --  --      Legionella SPUTUM  02-25-25   No Legionella species isolated  --  --      .Sputum Sputum  02-25-25   Commensal kyle consistent with body site  --    Moderate polymorphonuclear leukocytes per low power field  No Squamous epithelial cells per low power field  Rare Gram Positive Cocci in Pairs and Chains per oil power field  Rare Gram Positive Rods per oil power field      Catheterized Catheterized  02-25-25   <10,000 CFU/mL Normal Urogenital Kyle  --  --      .Blood Blood-Peripheral  02-24-25   No growth at 5 days  --  --                RADIOLOGY:

## 2025-03-25 NOTE — PROGRESS NOTE ADULT - NS ATTEND AMEND GEN_ALL_CORE FT
Pt is a 39M with MHx obesity (Class III, BMI 69), pAfib (no AC), HTN, BEA (dz'ed 2019, AHI 34 non-compliant on CPAP set at 10), and history of b/l papilledema attributed to pseudotumor cerebri initially presenting as a transfer [facility] on 2/26 for acute hypoxemic respiratory failure s/p ETT intubation/MV with progression to refractory ARDS s/p initiation of vv-ECMO support (2/26-3/7) with failure to wean from ventilator s/p tracheostomy placement, further found to have RV failure s/p aggressive diuresis and PNA followed by severe sepsis 2/2 panniculitis c/b candida albicans fungemia now transferred to RCU for further medical management.     Pt now awake and alert, spontaneously communicative and at mental status baseline. Continues to have severe functional debility likely 2/2 prolonged critical illness polyneuropathy but is progressing well. Now able to transfer via [place] to chair daily and is independent of UE and fine motor function. Off all sedation. Initiate weaning of catapres today. Weaning pain control, pt remains dependent on Dilaudid prn for LE neuropathic pain. Adding PO regimen. Gabapentin increased.    Pt with acute combined hypoxemic and hypercapnic respiratory failure with failure to wean from ventilator s/p tracheostomy placement. Now tolerating TC QD, PSV (18/10) qhs. Will attempt to keept trach capped and used oral interface for qhs Bilevel support. Airway clearance therapy in place. Wean O2 supplementation for goal O2 saturation 90-95%. Aspiration precautions and oral hygiene in place. Trach care and suctioning as per RCU routine. Pt phonating well, no issues with mucous expectoration or swallow impairment.     Pt initially p/w RV failure 2/2 pulmHTN now s/p aggressive diuresis with improvement. Now transitioned to PO diuretics with goal to maintain euvolemia. AFib well controlled conservatively, will continue to monitor on telemetry. TTE 2/25 with new RVE, 3/11 with RVSD. DVT noted on repeat duplex of the right IJ and bilateral peroneal veins. Will c/w full A/C on Eliquis for DVT and AFib.     Pt with oropharyngeal dysphagia s/p PEG placement (3/7) now transitioned to PO diet (3/20) via FEEST. Will continue to monitor on soft diet. Bowel regimen in place prn. PPI ppx. Transaminitis improving.     Pt further found to have severe sepsis 2/ candida albicans fungemia possibly from panniculitis (3/15, cleared 3/18) now on caspofungin with plan to complete 14 day course from clearance. TTE (-) endocarditis. Wound care team following. ID recs appreciated.     Dispo pending medical optimization. Pt full code. DVT ppx in place. Palliative consulted, recs appreciated. Will continue to update family and discuss plan of care throughout hospitalization.

## 2025-03-25 NOTE — PROGRESS NOTE ADULT - SUBJECTIVE AND OBJECTIVE BOX
CHIEF COMPLAINT: Patient is a 39y old  Male who presents with a chief complaint of sob (02 Mar 2025 06:19)      INTERVAL EVENTS:   - no acute overnight events, VSS, afebrile on Bipap QHS  -  trial ongoing, morning VBG pending  - continued on Caspo to 4/2    ROS: Seen by bedside during AM rounds     OBJECTIVE:  ICU Vital Signs Last 24 Hrs  T(C): 37.2 (25 Mar 2025 04:00), Max: 37.2 (25 Mar 2025 04:00)  T(F): 98.9 (25 Mar 2025 04:00), Max: 98.9 (25 Mar 2025 04:00)  HR: 108 (25 Mar 2025 04:00) (88 - 114)  BP: 120/75 (25 Mar 2025 04:00) (103/83 - 133/79)  BP(mean): --  ABP: --  ABP(mean): --  RR: 16 (25 Mar 2025 04:00) (16 - 24)  SpO2: 100% (25 Mar 2025 04:00) (97% - 100%)    O2 Parameters below as of 25 Mar 2025 04:00  Patient On (Oxygen Delivery Method): BiPAP/CPAP          Mode: [facility]    03-24 @ 07:01  -  03-25 @ 07:00  --------------------------------------------------------  IN: 4600 mL / OUT: 5400 mL / NET: -800 mL      CAPILLARY BLOOD GLUCOSE      POCT Blood Glucose.: 118 mg/dL (24 Mar 2025 21:56)      PHYSICAL EXAM:  General:   HEENT:   Lymph Nodes:  Neck:   Respiratory:   Cardiovascular:   Abdomen:   Extremities:   Skin:   Neurological:  Psychiatry:    Mode: [facility] MEDICATIONS:  MEDICATIONS  (STANDING):  albuterol/ipratropium for Nebulization 3 milliLiter(s) Nebulizer every 6 hours  amLODIPine   Tablet 10 milliGRAM(s) Oral daily  apixaban 5 milliGRAM(s) Oral every 12 hours  caspofungin IVPB 70 milliGRAM(s) IV Intermittent every 24 hours  chlorhexidine 0.12% Liquid 15 milliLiter(s) Swish and Spit two times a day  chlorhexidine 2% Cloths 1 Application(s) Topical <User Schedule>  cloNIDine 0.3 milliGRAM(s) Oral every 8 hours  clotrimazole 1% Cream 1 Application(s) Topical two times a day  dextrose 50% Injectable 25 Gram(s) IV Push once  dextrose 50% Injectable 12.5 Gram(s) IV Push once  dextrose 50% Injectable 25 Gram(s) IV Push once  furosemide    Tablet 40 milliGRAM(s) Oral two times a day  gabapentin 300 milliGRAM(s) Oral every 8 hours  glucagon  Injectable 1 milliGRAM(s) IntraMuscular once  insulin lispro (ADMELOG) corrective regimen sliding scale   SubCutaneous three times a day before meals  melatonin 3 milliGRAM(s) Oral at bedtime  multivitamin/minerals/iron Oral Solution (CENTRUM) 15 milliLiter(s) Oral daily  pantoprazole    Tablet 40 milliGRAM(s) Oral before breakfast  polyethylene glycol 3350 17 Gram(s) Oral daily  QUEtiapine 25 milliGRAM(s) Oral at bedtime  senna Syrup 5 milliLiter(s) Oral at bedtime    MEDICATIONS  (PRN):  acetaminophen     Tablet .. 650 milliGRAM(s) Oral every 6 hours PRN Temp greater or equal to 38C (100.4F), Mild Pain (1 - 3), Moderate Pain (4 - 6)  HYDROmorphone  Injectable 0.5 milliGRAM(s) IV Push every 4 hours PRN Severe Pain (7 - 10)      LABS:                        7.6    7.14  )-----------( 429      ( 24 Mar 2025 07:12 )             25.3     03-24    136  |  91[L]  |  5[L]  ----------------------------<  124[H]  3.4[L]   |  35[H]  |  0.80    Ca    9.0      24 Mar 2025 07:12  Phos  5.1     03-24  Mg     1.70     03-24        Urinalysis Basic - ( 24 Mar 2025 07:12 )    Color: x / Appearance: x / SG: x / pH: x  Gluc: 124 mg/dL / Ketone: x  / Bili: x / Urobili: x   Blood: x / Protein: x / Nitrite: x   Leuk Esterase: x / RBC: x / WBC x   Sq Epi: x / Non Sq Epi: x / Bacteria: x        Venous Blood Gas:  03-24 @ 07:12  7.43/69/96/46/98.8  VBG Lactate: 0.8  Venous Blood Gas:  03-23 @ 07:38  7.40/69/90/43/97.6  VBG Lactate: 0.7   CHIEF COMPLAINT: Patient is a 39y old  Male who presents with a chief complaint of sob (02 Mar 2025 06:19)      INTERVAL EVENTS:   - no acute overnight events, VSS, afebrile on Bipap QHS  -  trial ongoing, morning VBG 7.34/79/43  - continued on Caspo to 4/2    ROS: Seen by bedside during AM rounds, denies acute complaints    OBJECTIVE:  ICU Vital Signs Last 24 Hrs  T(C): 37.2 (25 Mar 2025 04:00), Max: 37.2 (25 Mar 2025 04:00)  T(F): 98.9 (25 Mar 2025 04:00), Max: 98.9 (25 Mar 2025 04:00)  HR: 108 (25 Mar 2025 04:00) (88 - 114)  BP: 120/75 (25 Mar 2025 04:00) (103/83 - 133/79)  BP(mean): --  ABP: --  ABP(mean): --  RR: 16 (25 Mar 2025 04:00) (16 - 24)  SpO2: 100% (25 Mar 2025 04:00) (97% - 100%)    O2 Parameters below as of 25 Mar 2025 04:00  Patient On (Oxygen Delivery Method): BiPAP/CPAP          Mode: [facility]    03-24 @ 07:01  -  03-25 @ 07:00  --------------------------------------------------------  IN: 4600 mL / OUT: 5400 mL / NET: -800 mL      CAPILLARY BLOOD GLUCOSE      POCT Blood Glucose.: 118 mg/dL (24 Mar 2025 21:56)      PHYSICAL EXAM:  General: NAD, sitting in chair  HEENT: +TC with , able to phonate  Respiratory: lungs CTA b/l, no rales/rhonchi or wheeze, normal respiratory effort  Cardiovascular: +sinus tachycardia  Abdomen: soft, non tender, non distended, +PEG  Extremities: b/l LE in c/d/i dressings  Skin: warm, dry  Neurological: AAO x 4, awake and alert, follows commands, no gross focal deficits, moving extremities freely   Psychiatry: normal affect and behavior    Mode: [facility] MEDICATIONS:  MEDICATIONS  (STANDING):  albuterol/ipratropium for Nebulization 3 milliLiter(s) Nebulizer every 6 hours  amLODIPine   Tablet 10 milliGRAM(s) Oral daily  apixaban 5 milliGRAM(s) Oral every 12 hours  caspofungin IVPB 70 milliGRAM(s) IV Intermittent every 24 hours  chlorhexidine 0.12% Liquid 15 milliLiter(s) Swish and Spit two times a day  chlorhexidine 2% Cloths 1 Application(s) Topical <User Schedule>  cloNIDine 0.3 milliGRAM(s) Oral every 8 hours  clotrimazole 1% Cream 1 Application(s) Topical two times a day  dextrose 50% Injectable 25 Gram(s) IV Push once  dextrose 50% Injectable 12.5 Gram(s) IV Push once  dextrose 50% Injectable 25 Gram(s) IV Push once  furosemide    Tablet 40 milliGRAM(s) Oral two times a day  gabapentin 300 milliGRAM(s) Oral every 8 hours  glucagon  Injectable 1 milliGRAM(s) IntraMuscular once  insulin lispro (ADMELOG) corrective regimen sliding scale   SubCutaneous three times a day before meals  melatonin 3 milliGRAM(s) Oral at bedtime  multivitamin/minerals/iron Oral Solution (CENTRUM) 15 milliLiter(s) Oral daily  pantoprazole    Tablet 40 milliGRAM(s) Oral before breakfast  polyethylene glycol 3350 17 Gram(s) Oral daily  QUEtiapine 25 milliGRAM(s) Oral at bedtime  senna Syrup 5 milliLiter(s) Oral at bedtime    MEDICATIONS  (PRN):  acetaminophen     Tablet .. 650 milliGRAM(s) Oral every 6 hours PRN Temp greater or equal to 38C (100.4F), Mild Pain (1 - 3), Moderate Pain (4 - 6)  HYDROmorphone  Injectable 0.5 milliGRAM(s) IV Push every 4 hours PRN Severe Pain (7 - 10)      LABS:                        7.6    7.14  )-----------( 429      ( 24 Mar 2025 07:12 )             25.3     03-24    136  |  91[L]  |  5[L]  ----------------------------<  124[H]  3.4[L]   |  35[H]  |  0.80    Ca    9.0      24 Mar 2025 07:12  Phos  5.1     03-24  Mg     1.70     03-24        Urinalysis Basic - ( 24 Mar 2025 07:12 )    Color: x / Appearance: x / SG: x / pH: x  Gluc: 124 mg/dL / Ketone: x  / Bili: x / Urobili: x   Blood: x / Protein: x / Nitrite: x   Leuk Esterase: x / RBC: x / WBC x   Sq Epi: x / Non Sq Epi: x / Bacteria: x        Venous Blood Gas:  03-24 @ 07:12  7.43/69/96/46/98.8  VBG Lactate: 0.8  Venous Blood Gas:  03-23 @ 07:38  7.40/69/90/43/97.6  VBG Lactate: 0.7

## 2025-03-25 NOTE — PROVIDER CONTACT NOTE (CRITICAL VALUE NOTIFICATION) - PERSON GIVING RESULT:
RASHAD Brice (Central Lab)
CHRISTOPH Riley
Eliseo EDWARDS
RICKIE Aly (Central LAb)
Sony Trinidad
MANNY Harding (Central Lab)
LES Higgins.
MARVA Olivia

## 2025-03-25 NOTE — PROGRESS NOTE ADULT - ASSESSMENT
This is a 38 y/o M w/ PMHx AF (not on AC), HTN, BEA, pseudotumor cerebri s/p LP in 2024, initially presented to [place] on 2/24, SOB, LE edema with URI symptoms and sick contacts, noted to have severe ARDS, intubated however still hypoxia, cannulated for VV ECMO on 2/25, transferred to [facility] on 2/25, started on empiric Vanco, Zosyn for multifocal PNA, abdominal wall cellulitis, s/p trach placement by CT surgery on 3/7, ECMO decannulated on 3/7. Zosyn held on 3/9.  C/b fevers on 3/10, restarted on Zosyn, transferred to [facility] on 3/13, Bcx now w/ yeast.    #Candida albicans fungemia   #Fevers   #Multifocal PNA, abdominal wall cellulitis s/p Vanco/Zosyn  #ARDS, hypoxic respiratory failure requiring VV EMCO 2/2 multifocal PNA? s/p decannulation on 3/7    Overall, 38 y/o M w/ PMHx AF (not on AC), HTN, BEA, pseudotumor cerebri s/p LP in 2024 admitted to [facility] on 2/26 for ARDS, hypoxic respiratory failure requiring VV EMCO 2/2 multifocal PNA? s/p decannulation on 3/7, c/b abdominal wall cellulitis s/p Vancomycin/Zosyn, s/p trach on 3/7, in the setting of persistent fevers despite Zosyn, BCx now w/ yeast, Candida albicans   Unclear source for yeast in BCx, pt had all central lines removed on 3/7, not getting TPN, no abdominal pain on exam, PEG site well appearing.     CT A/P w/o clear abdominal source of fungemia, possibly 2/2 abdominal wall cellulitis? TTE w/o IE.  BCx 3/18 1/2 positive, 3/20 NGTD x 2.     Recommendations;   1. Caspofungin 70 mg daily (given weight) (would avoid fluconazole for now given high dose needed). Would plan for 2 week course after negative BCx  (if 3/20 remains NG, then 4/2/25)  2. Repeat BCx on 3/20 NGTD  3. TTE wnl, possible ROBERT if not clearing      Thank you for consulting us and involving us in the management of this patient's case. In addition to reviewing history, imaging, documents, labs, microbiology, and infection control strategies and potential issues.     ID will continue to follow    Shailesh Owens M.D.  Attending Physician  Division of Infectious Diseases  Department of Medicine    Please contact through MS Teams message.  Office: [phone] (after 5 PM or weekend)

## 2025-03-26 LAB
ANION GAP SERPL CALC-SCNC: 10 MMOL/L — SIGNIFICANT CHANGE UP (ref 7–14)
BUN SERPL-MCNC: 5 MG/DL — LOW (ref 7–23)
CALCIUM SERPL-MCNC: 9 MG/DL — SIGNIFICANT CHANGE UP (ref 8.4–10.5)
CHLORIDE SERPL-SCNC: 91 MMOL/L — LOW (ref 98–107)
CO2 SERPL-SCNC: 38 MMOL/L — HIGH (ref 22–31)
CREAT SERPL-MCNC: 0.79 MG/DL — SIGNIFICANT CHANGE UP (ref 0.5–1.3)
CULTURE RESULTS: SIGNIFICANT CHANGE UP
EGFR: 117 ML/MIN/1.73M2 — SIGNIFICANT CHANGE UP
EGFR: 117 ML/MIN/1.73M2 — SIGNIFICANT CHANGE UP
GAS PNL BLDV: SIGNIFICANT CHANGE UP
GLUCOSE BLDC GLUCOMTR-MCNC: 103 MG/DL — HIGH (ref 70–99)
GLUCOSE BLDC GLUCOMTR-MCNC: 105 MG/DL — HIGH (ref 70–99)
GLUCOSE BLDC GLUCOMTR-MCNC: 112 MG/DL — HIGH (ref 70–99)
GLUCOSE BLDC GLUCOMTR-MCNC: 123 MG/DL — HIGH (ref 70–99)
GLUCOSE SERPL-MCNC: 95 MG/DL — SIGNIFICANT CHANGE UP (ref 70–99)
HCT VFR BLD CALC: 23.1 % — LOW (ref 39–50)
HGB BLD-MCNC: 7.7 G/DL — LOW (ref 13–17)
MAGNESIUM SERPL-MCNC: 1.7 MG/DL — SIGNIFICANT CHANGE UP (ref 1.6–2.6)
MCHC RBC-ENTMCNC: 29.3 PG — SIGNIFICANT CHANGE UP (ref 27–34)
MCHC RBC-ENTMCNC: 33.3 G/DL — SIGNIFICANT CHANGE UP (ref 32–36)
MCV RBC AUTO: 87.8 FL — SIGNIFICANT CHANGE UP (ref 80–100)
NRBC # BLD AUTO: 0 K/UL — SIGNIFICANT CHANGE UP (ref 0–0)
NRBC # FLD: 0 K/UL — SIGNIFICANT CHANGE UP (ref 0–0)
NRBC BLD AUTO-RTO: 0 /100 WBCS — SIGNIFICANT CHANGE UP (ref 0–0)
PHOSPHATE SERPL-MCNC: 5.5 MG/DL — HIGH (ref 2.5–4.5)
PLATELET # BLD AUTO: 489 K/UL — HIGH (ref 150–400)
POTASSIUM SERPL-MCNC: 3.9 MMOL/L — SIGNIFICANT CHANGE UP (ref 3.5–5.3)
POTASSIUM SERPL-SCNC: 3.9 MMOL/L — SIGNIFICANT CHANGE UP (ref 3.5–5.3)
RBC # BLD: 2.63 M/UL — LOW (ref 4.2–5.8)
RBC # FLD: 24.3 % — HIGH (ref 10.3–14.5)
SODIUM SERPL-SCNC: 139 MMOL/L — SIGNIFICANT CHANGE UP (ref 135–145)
SPECIMEN SOURCE: SIGNIFICANT CHANGE UP
WBC # BLD: 8.29 K/UL — SIGNIFICANT CHANGE UP (ref 3.8–10.5)
WBC # FLD AUTO: 8.29 K/UL — SIGNIFICANT CHANGE UP (ref 3.8–10.5)

## 2025-03-26 PROCEDURE — 99232 SBSQ HOSP IP/OBS MODERATE 35: CPT

## 2025-03-26 PROCEDURE — G0545: CPT

## 2025-03-26 PROCEDURE — 94010 BREATHING CAPACITY TEST: CPT | Mod: 26

## 2025-03-26 PROCEDURE — 99233 SBSQ HOSP IP/OBS HIGH 50: CPT

## 2025-03-26 RX ORDER — LIDOCAINE HYDROCHLORIDE 20 MG/ML
1 JELLY TOPICAL DAILY
Refills: 0 | Status: DISCONTINUED | OUTPATIENT
Start: 2025-03-26 | End: 2025-04-25

## 2025-03-26 RX ORDER — SENNA 187 MG
2 TABLET ORAL AT BEDTIME
Refills: 0 | Status: DISCONTINUED | OUTPATIENT
Start: 2025-03-26 | End: 2025-04-21

## 2025-03-26 RX ORDER — HYDROMORPHONE/SOD CHLOR,ISO/PF 2 MG/10 ML
0.5 SYRINGE (ML) INJECTION EVERY 6 HOURS
Refills: 0 | Status: DISCONTINUED | OUTPATIENT
Start: 2025-03-26 | End: 2025-04-02

## 2025-03-26 RX ORDER — GABAPENTIN 400 MG/1
400 CAPSULE ORAL EVERY 8 HOURS
Refills: 0 | Status: DISCONTINUED | OUTPATIENT
Start: 2025-03-26 | End: 2025-04-01

## 2025-03-26 RX ORDER — ACETAZOLAMIDE 250 MG/1
250 TABLET ORAL ONCE
Refills: 0 | Status: COMPLETED | OUTPATIENT
Start: 2025-03-26 | End: 2025-03-26

## 2025-03-26 RX ORDER — OXYCODONE HYDROCHLORIDE 30 MG/1
5 TABLET ORAL EVERY 4 HOURS
Refills: 0 | Status: DISCONTINUED | OUTPATIENT
Start: 2025-03-26 | End: 2025-03-27

## 2025-03-26 RX ORDER — ACETAMINOPHEN 500 MG/5ML
650 LIQUID (ML) ORAL EVERY 6 HOURS
Refills: 0 | Status: DISCONTINUED | OUTPATIENT
Start: 2025-03-26 | End: 2025-03-28

## 2025-03-26 RX ADMIN — Medication 15 MILLILITER(S): at 18:51

## 2025-03-26 RX ADMIN — CLOTRIMAZOLE 1 APPLICATION(S): 1 CREAM TOPICAL at 05:44

## 2025-03-26 RX ADMIN — FUROSEMIDE 40 MILLIGRAM(S): 10 INJECTION INTRAMUSCULAR; INTRAVENOUS at 14:27

## 2025-03-26 RX ADMIN — IPRATROPIUM BROMIDE AND ALBUTEROL SULFATE 3 MILLILITER(S): .5; 2.5 SOLUTION RESPIRATORY (INHALATION) at 15:42

## 2025-03-26 RX ADMIN — FUROSEMIDE 40 MILLIGRAM(S): 10 INJECTION INTRAMUSCULAR; INTRAVENOUS at 05:43

## 2025-03-26 RX ADMIN — Medication 0.2 MILLIGRAM(S): at 14:27

## 2025-03-26 RX ADMIN — AMLODIPINE BESYLATE 10 MILLIGRAM(S): 10 TABLET ORAL at 05:42

## 2025-03-26 RX ADMIN — GABAPENTIN 400 MILLIGRAM(S): 400 CAPSULE ORAL at 21:47

## 2025-03-26 RX ADMIN — Medication 3 MILLIGRAM(S): at 21:46

## 2025-03-26 RX ADMIN — IPRATROPIUM BROMIDE AND ALBUTEROL SULFATE 3 MILLILITER(S): .5; 2.5 SOLUTION RESPIRATORY (INHALATION) at 22:55

## 2025-03-26 RX ADMIN — Medication 0.5 MILLIGRAM(S): at 10:16

## 2025-03-26 RX ADMIN — Medication 0.2 MILLIGRAM(S): at 05:42

## 2025-03-26 RX ADMIN — Medication 1 APPLICATION(S): at 05:42

## 2025-03-26 RX ADMIN — LIDOCAINE HYDROCHLORIDE 1 PATCH: 20 JELLY TOPICAL at 19:00

## 2025-03-26 RX ADMIN — CLOTRIMAZOLE 1 APPLICATION(S): 1 CREAM TOPICAL at 18:50

## 2025-03-26 RX ADMIN — CASPOFUNGIN ACETATE 260 MILLIGRAM(S): 5 INJECTION, POWDER, LYOPHILIZED, FOR SOLUTION INTRAVENOUS at 06:24

## 2025-03-26 RX ADMIN — Medication 0.5 MILLIGRAM(S): at 21:47

## 2025-03-26 RX ADMIN — Medication 650 MILLIGRAM(S): at 18:49

## 2025-03-26 RX ADMIN — Medication 650 MILLIGRAM(S): at 19:19

## 2025-03-26 RX ADMIN — Medication 15 MILLILITER(S): at 12:07

## 2025-03-26 RX ADMIN — Medication 0.2 MILLIGRAM(S): at 21:46

## 2025-03-26 RX ADMIN — APIXABAN 5 MILLIGRAM(S): 2.5 TABLET, FILM COATED ORAL at 18:50

## 2025-03-26 RX ADMIN — Medication 2 TABLET(S): at 21:50

## 2025-03-26 RX ADMIN — GABAPENTIN 400 MILLIGRAM(S): 400 CAPSULE ORAL at 14:26

## 2025-03-26 RX ADMIN — GABAPENTIN 300 MILLIGRAM(S): 400 CAPSULE ORAL at 05:43

## 2025-03-26 RX ADMIN — POLYETHYLENE GLYCOL 3350 17 GRAM(S): 17 POWDER, FOR SOLUTION ORAL at 12:07

## 2025-03-26 RX ADMIN — LIDOCAINE HYDROCHLORIDE 1 PATCH: 20 JELLY TOPICAL at 18:56

## 2025-03-26 RX ADMIN — LIDOCAINE HYDROCHLORIDE 1 PATCH: 20 JELLY TOPICAL at 18:52

## 2025-03-26 RX ADMIN — ACETAZOLAMIDE 250 MILLIGRAM(S): 250 TABLET ORAL at 11:01

## 2025-03-26 RX ADMIN — Medication 0.5 MILLIGRAM(S): at 04:05

## 2025-03-26 RX ADMIN — Medication 0.5 MILLIGRAM(S): at 10:46

## 2025-03-26 RX ADMIN — Medication 0.5 MILLIGRAM(S): at 22:30

## 2025-03-26 RX ADMIN — IPRATROPIUM BROMIDE AND ALBUTEROL SULFATE 3 MILLILITER(S): .5; 2.5 SOLUTION RESPIRATORY (INHALATION) at 07:59

## 2025-03-26 RX ADMIN — APIXABAN 5 MILLIGRAM(S): 2.5 TABLET, FILM COATED ORAL at 05:42

## 2025-03-26 RX ADMIN — IPRATROPIUM BROMIDE AND ALBUTEROL SULFATE 3 MILLILITER(S): .5; 2.5 SOLUTION RESPIRATORY (INHALATION) at 03:28

## 2025-03-26 RX ADMIN — Medication 15 MILLILITER(S): at 05:43

## 2025-03-26 RX ADMIN — QUETIAPINE FUMARATE 25 MILLIGRAM(S): 25 TABLET ORAL at 21:46

## 2025-03-26 NOTE — PROGRESS NOTE ADULT - ASSESSMENT
Assessment/Plan:    Wound care follow up for full thickness wound in gluteal cleft/sacrum and bilateral buttocks   -      Continue low airloss support surface.  Continue to turn and position every 2 hours with z-frankie fluidized positioning device.  Continue use of Complete Cair pressure offloading boots.  Continue Nutritional management as per RD recommendations.    Upon discharge follow up at outpatient [facility]. [address] [place] [address]. [phone].    Remainder of care per primary team, will continue to follow while inpatient. Please reconsult eralier if needed   Thank you.    Discussed findings and plan with primary team MAX Hay  at the bedside   Milly Baron, SVETLANA-BC, CWIESHA   pager #[phone] or available in MS teams     If after 4PM or before 7:30AM on Mon-Friday or weekend/holiday please contact general surgery for urgent matters.   Team A- [id number]   Team B- [id number]  For non-urgent matters e-mail [email or web address]    I spent 50 minutes face-to-face with this patient of which more than 50% of the time was spent counseling/coordinating care of this patient. Assessment/Plan: This is a 40 y/o M w/ PMHx AF (not on AC), HTN, BEA, pseudotumor cerebri s/p LP in 2024, initially presented to [place] on 2/24, SOB, LE edema with URI symptoms and sick contacts, noted to have severe ARDS, intubated however still hypoxia, cannulated for VV ECMO on 2/25, transferred to [facility] on 2/25, started on empiric Vanco, Zosyn for multifocal PNA, abdominal wall cellulitis, s/p trach placement by CT surgery on 3/7, ECMO decannulated on 3/7. Zosyn held on 3/9. C/b fevers on 3/10, restarted on Zosyn, transferred to [facility] on 3/13, Bcx now w/ yeast. ID following.     Wound care follow up for full thickness wound in gluteal cleft/sacrum and bilateral buttocks     -Sacralgluteal cleft extending to b/l buttock evolved deep tissue pressure injury complicated by moisture (persistent high fevers), incontinence and friction, tissue type now with fibrinous slough and moist gray/tan eschar, necrotic tissue over gluteal cleft area  -No s/s of acute infection in todays assessment, patient afebrile, WBC wnl   -Wound friable with sanguinous oozing noted when patient turned, held pressure with gauze for less than 5 minutes and oozing subsided. Hemostatic dressing placed and primary team/RN to follow up to monitor site. If uncontrolled bleeding occurs, then recommend consulting general surgery to evaluate. No obvious bleeding vessel noted on exam, patient receiving Eliquis per primary team, Plan is for patient to be out of bed to the chair later (after monitoring bleeding has subsided) . Discussed with patient, ACP and nurse importance of limiting seating time to less than 2 consecutive hours   -Per records, patient Microcytic and hemochromic, sent anemia panel 3/19, today HH 7.7/23.1  -Gluteal cleft appears to be deeper today (located in a groove), no crepitus, no fluctuance, no purulence express   -Today noted moderate serofibrinous drainage, no odor, recommend to switch dressings from TRIAD barrier paste to Aquacel hydrofiber in gluteal cleft and   -Continue to offload with large Z fluidized positioning devices   -Use single breathable pad for moisture control   -Continue to collaborate with Safe Patient Handling when transfer patient etc     Topical recommendations   - Tracheostomy:  Cleanse around trach site with NS. Pat dry. Apply Silicone foam dressing without border beneath trach collar, cut mid dressing to "Y" shape. Change foam dressing every shift and prn. Change trach ties every 3 days.   - PEG with Peritubular Skin: Cleanse q shift with NS, apply liquid barrier film beneath matt disc.  If redness noted under matt disc bumper apply thin foam  dressing without border (Mepilex Lite)- cut to mid dressing with a 'Y' shape. Secondary securement to abdomen taping in 'H' fashion with Steri-strips.  Wound Care   .Gluteal cleft: Clean wound and periwound skin with NS (place NS in clear peribottle and irrigate wound). Pat dry. Apply liquid barrier film to periwound skin, allow to dry. Place Aquacel AG hydrofiber over base of wound (No need for secondary dressing to cover as given anatomy it should stay in place), Change daily or prn if soiled   **Alternatively if bleeding occurs recommend to place hemostatic dressing (Surgecel or Kaltostat (PS # for kaltostat calcium alginate [id number]), when removing if dressing is not moist remove with NS.   .Bilateral buttocks shallow wounds: Clean wound and periwound skin with NS. Pat dry. Apply liquid barrier film to periwound skin, allow to dry. Cover with hydrocolloid dressing (Comfeel Plus), change daily or prn if soiled.    .Bilateral plantar feet unroof blisters-Management per primary team   .Bilateral abdominal pannus, bilateral groin: After cleansing, apply Interdry textile sheeting, under intertriginous folds leaving 2 inches exposed at ends to wick, remove to wash & dry affected area, then replace. Individual sheeting may be used for up to 5 days unless soiled. Inspect skin daily.   .Recommend continue use of large Z fluidized positioning device to elevate heels (Frankie Lock)   .Continue turning and positioning w/ offloading assistive devices as per protocol  .Continue w/ Pericare as per protocol  .Continue use of Waffle Cushion to chair if oob to chair, limit seating time to less than 2 consecutive hours   Continue w/ bariatric low air loss fluidized bed surface     Care as per medicine, will follow w/ you periodically during hopsital stay. please reconsult earlier if needed     Upon discharge f/u as outpatient at [facility] [place] [address] [phone]        Wound care follow up for full thickness wound in gluteal cleft/sacrum and bilateral buttocks    Continue low airloss support surface.  Continue to turn and position every 2 hours with z-frankie fluidized positioning device.  Continue use of Complete Cair pressure offloading boots.  Continue Nutritional management as per RD recommendations.    Upon discharge follow up at outpatient [facility] [place]. [address] [facility]. [phone].    Remainder of care per primary team, will continue to follow while inpatient. Please reconsult eralier if needed   Thank you.    Discussed findings and plan with primary team MAX Hay  at the bedside   Milly Baron, SVETLANA-BC, WILLIAMS   pager #[phone] or available in MS teams     If after 4PM or before 7:30AM on Mon-Friday or weekend/holiday please contact general surgery for urgent matters.   Team A- [id number]   Team B- [id number]  For non-urgent matters e-mail [email or web address]    I spent 50 minutes face-to-face with this patient of which more than 50% of the time was spent counseling/coordinating care of this patient. Assessment/Plan: This is a 38 y/o M w/ PMHx AF (not on AC), HTN, BEA, pseudotumor cerebri s/p LP in 2024, initially presented to [place] on 2/24, SOB, LE edema with URI symptoms and sick contacts, noted to have severe ARDS, intubated however still hypoxia, cannulated for VV ECMO on 2/25, transferred to [facility] on 2/25, started on empiric Vanco, Zosyn for multifocal PNA, abdominal wall cellulitis, s/p trach placement by CT surgery on 3/7, ECMO decannulated on 3/7. Zosyn held on 3/9. C/b fevers on 3/10, restarted on Zosyn, transferred to [facility] on 3/13, Bcx now w/ yeast. ID following.     Wound care follow up for full thickness wound in gluteal cleft/sacrum and bilateral buttocks     -Sacralgluteal cleft extending to b/l buttock evolved deep tissue pressure injury complicated by moisture (sporadic fevers), incontinence and friction, tissue type with  fibrinous slough and moist gray/tan eschar, necrotic tissue over gluteal cleft area  -Overall tissue type evolving since admission likely due to mixed etiologies moisture, friction, critical illness (mechanical ventilation > 72 hours, sepsis), and pressure   -No s/s of acute infection in todays assessment, patient afebrile, WBC wnl   -Wound friable with sanguinous oozing noted when patient turned, held pressure with gauze for less than 5 minutes and oozing subsided. Hemostatic dressing placed and primary team/RN to follow up to monitor site. If uncontrolled bleeding occurs, then consider consulting general surgery to evaluate. No obvious bleeding vessel noted on exam, patient receiving Eliquis per primary team, Plan is for patient to be out of bed to the chair later (after monitoring bleeding has subsided) . Discussed with patient, ACP and nurse importance of limiting seating time to less than 2 consecutive hours   -Per records, patient Microcytic and hemochromic, sent anemia panel 3/19, today H H 7.7/23.1 (at baseline)  -Gluteal cleft appears to be deeper today (located in a groove), slough and eschar at base, no crepitus, no fluctuance, no purulence express   -Today noted moderate serofibrinous drainage, no odor, recommend to switch dressings from TRIAD barrier paste to Aquacel hydrofiber in gluteal cleft and hydrocolloid dressing to bilateral buttocks   -Continue to offload with large Z fluidized positioning devices   -Use single breathable pad for moisture control   -Continue to collaborate with Safe Patient Handling when transfer patient etc     Topical recommendations   - Tracheostomy:  Cleanse around trach site with NS. Pat dry. Apply Silicone foam dressing without border beneath trach collar, cut mid dressing to "Y" shape. Change foam dressing every shift and prn. Change trach ties every 3 days.   - PEG with Peritubular Skin: Cleanse q shift with NS, apply liquid barrier film beneath matt disc.  If redness noted under matt disc bumper apply thin foam  dressing without border (Mepilex Lite)- cut to mid dressing with a 'Y' shape. Secondary securement to abdomen taping in 'H' fashion with Steri-strips.  Wound Care   .Gluteal cleft: Clean wound and periwound skin with NS (place NS in clear peribottle and irrigate wound). Pat dry. Apply liquid barrier film to periwound skin, allow to dry. Place Aquacel AG hydrofiber over base of wound (No need for secondary dressing to cover as given anatomy it should stay in place), Change daily or prn if soiled   **Alternatively if bleeding occurs recommend to place hemostatic dressing (Surgecel or Kaltostat (PS # for kaltostat calcium alginate [id number]), when removing if dressing is not moist remove with NS. Change daily or prn if soiled   .Bilateral buttocks shallow wounds: Clean wound and periwound skin with NS. Pat dry. Apply liquid barrier film to periwound skin, allow to dry. Cover with hydrocolloid dressing (Comfeel Plus), change daily or prn if soiled.   . Bilateral plantar feet unroof blisters-Management per primary team   .Bilateral abdominal pannus, bilateral groin: After cleansing, apply Interdry textile sheeting, under intertriginous folds leaving 2 inches exposed at ends to wick, remove to wash & dry affected area, then replace. Individual sheeting may be used for up to 5 days unless soiled. Inspect skin daily.   .Recommend continue use of large Z fluidized positioning device to elevate heels (Ochoa Lock)   .Continue turning and positioning w/ offloading assistive devices as per protocol  .Continue w/ Pericare as per protocol  .Continue use of Bariatric Waffle Cushion to chair if oob to chair, limit seating time to less than 2 consecutive hours   Continue w/ bariatric low air loss fluidized bed surface   .Continue Nutritional management as per RD recommendations.    Care as per medicine, will follow w/ you periodically during hospsital stay. please reconsult earlier if needed     Upon discharge f/u as outpatient at [facility] [place] [phone]    Thank you.    Discussed findings and plan with primary team MAX Hay  at the bedside and primary RN   Findings discussed at length with patient, patient verbalized understanding. All questions answer to satisfaction   Milly Baron, AGNP-BC, CWIESHA   pager #[phone] or available in MS teams     If after 4PM or before 7:30AM on Mon-Friday or weekend/holiday please contact general surgery for urgent matters.   Team A- [id number]   Team B- [id number]  For non-urgent matters e-mail [email or web address]    I spent 50 minutes face-to-face with this patient of which more than 50% of the time was spent counseling/coordinating care of this patient.

## 2025-03-26 NOTE — PROGRESS NOTE ADULT - SUBJECTIVE AND OBJECTIVE BOX
Infectious Diseases Follow Up:    Patient is a 39y old  Male who presents with a chief complaint of sob (02 Mar 2025 06:19)      Interval History/ROS:  Afebrile ON, no acute complaints    Allergies  No Known Allergies        ANTIMICROBIALS:  caspofungin IVPB 70 every 24 hours      Current Abx:     Previous Abx     OTHER MEDS:  MEDICATIONS  (STANDING):  acetaminophen     Tablet .. 650 every 6 hours PRN  acetaZOLAMIDE Injectable 250 once  albuterol/ipratropium for Nebulization 3 every 6 hours  amLODIPine   Tablet 10 daily  apixaban 5 every 12 hours  cloNIDine 0.2 every 8 hours  dextrose 50% Injectable 25 once  dextrose 50% Injectable 12.5 once  dextrose 50% Injectable 25 once  furosemide    Tablet 40 two times a day  gabapentin 300 every 8 hours  glucagon  Injectable 1 once  HYDROmorphone  Injectable 0.5 every 6 hours PRN  insulin lispro (ADMELOG) corrective regimen sliding scale  three times a day before meals  melatonin 3 at bedtime  oxycodone    5 mG/acetaminophen 325 mG 1 every 4 hours PRN  pantoprazole    Tablet 40 before breakfast  polyethylene glycol 3350 17 daily  QUEtiapine 25 at bedtime  senna Syrup 5 at bedtime      Vital Signs Last 24 Hrs  T(C): 37.2 (26 Mar 2025 04:00), Max: 37.2 (25 Mar 2025 21:30)  T(F): 99 (26 Mar 2025 04:00), Max: 99 (26 Mar 2025 04:00)  HR: 106 (26 Mar 2025 07:59) (97 - 120)  BP: 118/75 (26 Mar 2025 04:00) (103/77 - 126/76)  BP(mean): --  RR: 25 (26 Mar 2025 04:00) (20 - 25)  SpO2: 100% (26 Mar 2025 07:59) (95% - 100%)    Parameters below as of 26 Mar 2025 07:59  Patient On (Oxygen Delivery Method): nasal cannula      PHYSICAL EXAM:  GENERAL: NAD  HEAD:  Atraumatic, Normocephalic  EYES: EOMI, conjunctiva and sclera clear  NECK: +trach with speaking valve capped,    CHEST/LUNG: On NC, CTAB  HEART: RRR  ABDOMEN: Soft, Nontender, Nondistended;  PSYCH: AAOx3                          7.7    8.29  )-----------( 489      ( 26 Mar 2025 05:25 )             23.1       03-26    139  |  91[L]  |  5[L]  ----------------------------<  95  3.9   |  38[H]  |  0.79    Ca    9.0      26 Mar 2025 05:25  Phos  5.5     03-26  Mg     1.70     03-26        Urinalysis Basic - ( 26 Mar 2025 05:25 )    Color: x / Appearance: x / SG: x / pH: x  Gluc: 95 mg/dL / Ketone: x  / Bili: x / Urobili: x   Blood: x / Protein: x / Nitrite: x   Leuk Esterase: x / RBC: x / WBC x   Sq Epi: x / Non Sq Epi: x / Bacteria: x        MICROBIOLOGY:  v  Trach Asp Tracheal Aspirate  03-21-25   Commensal kyle consistent with body site  --    Few polymorphonuclear leukocytes per low power field  No Squamous epithelial cells per low power field  No organisms seen per oil power field      Blood Blood-Peripheral  03-20-25   No growth at 5 days  --  --      Blood Blood-Venous  03-20-25   No growth at 5 days  --  --      Blood Blood-Peripheral  03-18-25   No growth at 5 days  --  Blood Culture PCR  Candida albicans      Nares/Axilla/Groin Nares/Axilla/Groin  03-17-25   Culture NEGATIVE for Candida auris.  This surveillance culture is intended for Infection Control purposes only.  A negative result does not preclude the carriage of other fungal  organisms.  --  --      Blood Blood-Venous  03-16-25   Growth in aerobic bottle: Candida albicans  See previous culture [id number]  --    Growth in aerobic bottle: Yeast like cells      Blood Blood-Peripheral  03-16-25   Growth in aerobic bottle: Candida albicans  See previous culture [id number]  --    Growth in aerobic bottle: Yeast like cells      Trach Asp Tracheal Aspirate  03-16-25   Commensal kyle consistent with body site  --    Few polymorphonuclear leukocytes per low power field  Rare Squamous epithelial cells per low power field  Rare Gram Positive Rods seen per oil power field  Rare Gram Negative Rods seen per oil power field      Blood Blood-Venous  03-15-25   Growth in aerobic bottle: Candida albicans  See previous culture [id number]  --    Growth in aerobic bottle: Yeast like cells      Blood Blood-Peripheral  03-15-25   Growth in aerobic bottle: Candida albicans  Direct identification is available within approximately 3-5  hours either by Blood Panel Multiplexed PCR or Direct  MALDI-TOF. Details: [email or web address]  --  Blood Culture PCR  Candida albicans      Catheterized Catheterized  03-12-25   <10,000 CFU/mL Normal Urogenital Kyle  --  --      Blood Blood-Peripheral  03-11-25   No growth at 5 days  --  --      Blood Blood-Peripheral  03-11-25   No growth at 5 days  --  --      Trach Asp Tracheal Aspirate  03-10-25   Commensal kyle consistent with body site  --    Few polymorphonuclear leukocytes per low power field  No Squamous epithelial cells per low power field  No organisms seen per oil power field      Bronchial Bronchial Lavage  03-05-25   No growth  --    Rare polymorphonuclear leukocytes per low power field  No squamous epithelial cells per low power field  No organisms seen per oil power field      Bronchial Bronchial Lavage  03-01-25   No growth to date  --    Few polymorphonuclear leukocytes per low power field  No Squamous epithelial cells per low power field  No organisms seen per oil power field      Bronchial Bronchial Lavage  02-26-25   No growth  --    Moderate polymorphonuclear leukocytes seen per low power field  No squamous epithelial cells seen per low power field  No organisms seen per oil power field      .Blood Blood  02-26-25   No growth at 5 days  --  --      Catheterized Catheterized  02-26-25   No growth  --  --      Legionella SPUTUM  02-25-25   No Legionella species isolated  --  --      .Sputum Sputum  02-25-25   Commensal kyle consistent with body site  --    Moderate polymorphonuclear leukocytes per low power field  No Squamous epithelial cells per low power field  Rare Gram Positive Cocci in Pairs and Chains per oil power field  Rare Gram Positive Rods per oil power field      Catheterized Catheterized  02-25-25   <10,000 CFU/mL Normal Urogenital Kyle  --  --      .Blood Blood-Peripheral  02-24-25   No growth at 5 days  --  --                RADIOLOGY:

## 2025-03-26 NOTE — PROGRESS NOTE ADULT - ASSESSMENT
38 YO M with PMHx of morbid obesity, BEA on CPAP, pAFIB (not on AC), HTN, and BL papilledema attributed to pseudotumor cerebri who presented initially to [facility] on 2/24 with SOB and BL LE edema. Found with URI outpatient with progressive SOB, and concern for noted for MFPNA on imaging. Course progressed with AHRF with worsening O2 demand, respiratory distress, secretions, and somnolence requiring intubation (difficult with success after 6 attempts) in ED and admission to [facility] MICU. While in MICU, patient noted with increasing O2 demand with no improvement despite optimal vent management. Concern noted for progressive ARDS, unable to prone given morbid obesity, and ultimately cannulated for vvECMO on 2/25 and transferred to [facility] for further management on 2/26. While at [facility], tx with diuresis and ABX, and ultimately decannulated and s/p 60XLTCP trach and PEG on 3/7. Patient ultimately transferred to RCU on 3/11 and course complicated by recurrent high grade fevers and found with fungemia.    NEUROLOGY  # Hx of Pseudotumor Cerebri   - s/p LP in 2024 with high opening pressure  - s/p MRI 2024 with possible pituitary mass but unclear because of pressure effect from pseudotumor cerebri causing partially empty sella.  - Prolactin elevated   - ACTH low but recieved steroids during admission   - FT4 low normal and TSH normal, but given FT4 low normal TSH should be higher   - Discuss endocrine consult for inhouse work up vs outpatient.     # Sedation   - Weaned off sedation in ICU   - Nimbex turned off 2/27   - Prop turned off 3/9   - Precedex turned off and catapres started 3/11  - Continue on catapres 0.2 TID (decreased 3/25) - taper    # Insomnia   - Patient worked night shift x 15 years   - Trouble sleeping at night leading to day time sleepiness and poor participation with PT  - Continue on Seroquel 25 QHS (increased 3/18)   - Continue on melatonin   - Continue on neurontin as below    # BL LE neuropathy   - CIPN concern   - Neurontin increased to 300mg Q8H with relief   - added Percocet Q4H for moderate pain (3/25) and continue on Dilaudid Q6H for severe pain     CARDIOVASCULAR  # HTN   - HTN noted in ICU requiring cardene and esmolol GTTs   - Lisinopril dc'ed to increase catapres   - Continue on norvasc 10 and catapres 0.3 TID   - Monitor BP     # AFIB   - Hx of pAFIB   - No RVR episodes or pAFIB episodes in ICU   - No rate control medications needed  - Monitor on telemetry     # RV dilation second to PHTN   - POCUS on arrival to [facility] with RVE concern   - TTE 10/2024 with EF 55-60 with normal LVSF, moderate LVH and normal RVSF and size with no concern for pHTN   - TTE on 2/25 at  with EF 61 and normal LVSF, but enlarged RV size with reduced RVSF, mild TR, mild PHTN with PASP 49, and dilated IVC to 3.4cn, but normal TAPSE 2.1.  - Continue on aggressive diuresis in ICU    - TTE 3/11 with RVSF reduced with TAPSE 1.6. IVC improved to 2.12cm.   - Continue on lasix 40 PO BID   - Monitor IO    RESPIRATORY  # ARDS second to MFPNA   - Hx of BEA on CPAP presented to  post URI with SOB and found with AHRF with progression to ARDS second to post viral MFPNA   - Intubated 2/25   - Cannulated for vvECMO 2/26   - s/p 60XLTCP tracheostomy 3/7   - Decannulated 3/7   - CTSx removed tracheostomy and ECMO sutures on 3/17   - Continue on TC QD with PMV as able with strong phonation  - Continue on PS 18/10/40 QHS given OHS   - Continue on nebs and HTS   -  trials continue and now trialing overnight with BIPAP for OHS (10/8/12)  - trend VBG QD    GI  # Dysphagia   - s/p PEG 3/7   - Attempted green dye on 3/13 and failed  - Continue on TF  - Continue on miralax and senna  - RPT green dye passed 3/19  - 3/20 Passed FEEST - on soft bite sized with thin liquids     # Mild transaminitis   - LFTs elevated in ICU with TBILI elevated likely from ECMO hemolysis   - US ABD with hepatic steatosis   - Trend LFTs      RENAL  # ELLA   - ELLA likely from cardiorenal with RVE   - Diuresed in ICU and improved   - Monitor renal function and UOP     # Hypernatremia   - Hypernatremia with fever spikes   - Fever curve improved and attempting to wean FWF   - Continue on  Q6H (decreased 3/19)    INFECTIOUS DISEASE  # Recurrent fevers with fungemia from unknown source   - Recurrent fevers post ECMO decannulation thought initially to be cytokine surge   - BCx, SCx, UCx, RVP and MRSA RPT on 3/12 negative   - Completed zosyn (3/12-3/18) empirically with improved WBC , however recurrent fevers noted.   - RPT SCx, UA, CXR and RVP negative  - TTE 3/17 with no IE  - PanCT 3/17 with PNA and persistent panniculitis   - BCx 3/15 and 3/16 with candida albicans.   - Concern for possible source from panniculitis   - Continue on caspo (3/17 - ) and RPT BCx 3/18 negative  - ID following  - plan for 2 week course after negative BCx to 4/2    # MFPNA and abdominal pannus cellulitis   - Presented to  post URI with SOB and found with AHRF with progression to ARDS second to post viral MFPNA   - RVP, legionella, strept, BAL, BCx, UCx, HIV, PCP, and adenovirus PCR negative   - Initially started on multiple agents in ICU and ultimatelty completed zosyn (2/26-3/9) ZMAX (2/25-2/26) and vanco (2/26-3/1)     # Penile discharge   - GC/ chlamydia negative   - HIV negative   - Monitor for now    # PPX   - MRSA PCR positive and completed bactroban (2/26-3/3)     HEME   # Anemia likely second to ECMO circuit vs AOCD   - HH low in ICU second to ECMO circuit   - HH dropping again today however no bleeding noted   - Microcytic and hemochromic, sent anemia panel 3/19  - Trend      VASCULAR   # R IJ and BL LE DVTs   - Post ECMO dopplers on 3/9 negative for BL UE/ LE DVTs.   - Subsequent post ECMO dopplers performed on 3/14 and noted with acute, non-occlusive deep vein thrombosis noted within the right internal jugular vein. acute, occlusive deep vein thromboses noted within the right and left peroneal veins at both mid calves, and age-indeterminate, occlusive deep vein thrombosis visualized within the left gastrocnemius vein at the proximal calf.     - Continue on argatroban GTT and change to eliquis     PODIATRY   # BL plantar feet blisters likely pressors induced  - Seen by podiatry and blisters lanced on 3/4 and again on 3/18  - Continue on local wound care per podiatry   - Podiatry following  - OK for WBAT will fu with PT for offloading shoe if appropriate    ENDOCRINE  # Pre-DM2   - A1C 6.7  - Continue on ISS   - Monitor FS   - IF no demand then stop ISS     LINES/ TUBES  - R IJ and R FEM ECMO (2/26-3/7)     SKIN  # Pannus canndial intertrigo   # Sacrum/ buttock MAD  - Continue on clotrimazole cream   - WOC appreciated     ETHICS/ GOC    - FULL CODE     DISPO - PT and PMR recc FLORENCE, however will continue to reassess for acute Seen by PMR and goal for acute rehab     40 YO M with PMHx of morbid obesity, BEA on CPAP, pAFIB (not on AC), HTN, and BL papilledema attributed to pseudotumor cerebri who presented initially to [facility] on 2/24 with SOB and BL LE edema. Found with URI outpatient with progressive SOB, and concern for noted for MFPNA on imaging. Course progressed with AHRF with worsening O2 demand, respiratory distress, secretions, and somnolence requiring intubation (difficult with success after 6 attempts) in ED and admission to [facility] MICU. While in MICU, patient noted with increasing O2 demand with no improvement despite optimal vent management. Concern noted for progressive ARDS, unable to prone given morbid obesity, and ultimately cannulated for vvECMO on 2/25 and transferred to [facility] for further management on 2/26. While at [facility], tx with diuresis and ABX, and ultimately decannulated and s/p 60XLTCP trach and PEG on 3/7. Patient ultimately transferred to RCU on 3/11 and course complicated by recurrent high grade fevers and found with fungemia.    NEUROLOGY  # Hx of Pseudotumor Cerebri   - s/p LP in 2024 with high opening pressure  - s/p MRI 2024 with possible pituitary mass but unclear because of pressure effect from pseudotumor cerebri causing partially empty sella.  - Prolactin elevated   - ACTH low but recieved steroids during admission   - FT4 low normal and TSH normal, but given FT4 low normal TSH should be higher   - Discuss endocrine consult for inhouse work up vs outpatient.     # Sedation   - Weaned off sedation in ICU   - Nimbex turned off 2/27   - Prop turned off 3/9   - Precedex turned off and catapres started 3/11  - Continue on catapres 0.2 TID (decreased 3/25) - taper    # Insomnia   - Patient worked night shift x 15 years   - Trouble sleeping at night leading to day time sleepiness and poor participation with PT  - Continue on Seroquel 25 QHS (increased 3/18)   - Continue on melatonin   - Continue on neurontin as below    # BL LE neuropathy   - CIPN concern   - Neurontin increased to 300mg Q8H with relief   - added Percocet Q4H for moderate pain (3/25) and continue on Dilaudid Q6H for severe pain   - Pain mgt called to consult - with recs for tylenol ATC, NEurontin increased to 400mg q6, Oxycodone 5 q4h prn , Dilaudid for BREAKTHROUGH pain only, Lidocaine patches on each leg, ice prn to feet    CARDIOVASCULAR  # HTN   - HTN noted in ICU requiring cardene and esmolol GTTs   - Lisinopril dc'ed to increase catapres   - Continue on norvasc 10 and catapres 0.3 TID   - Monitor BP     # AFIB   - Hx of pAFIB   - No RVR episodes or pAFIB episodes in ICU   - No rate control medications needed  - Monitor on telemetry     # RV dilation second to PHTN   - POCUS on arrival to [facility] with RVE concern   - TTE 10/2024 with EF 55-60 with normal LVSF, moderate LVH and normal RVSF and size with no concern for pHTN   - TTE on 2/25 at  with EF 61 and normal LVSF, but enlarged RV size with reduced RVSF, mild TR, mild PHTN with PASP 49, and dilated IVC to 3.4cn, but normal TAPSE 2.1.  - Continue on aggressive diuresis in ICU    - TTE 3/11 with RVSF reduced with TAPSE 1.6. IVC improved to 2.12cm.   - Continue on lasix 40 PO BID   - Monitor IO    RESPIRATORY  # ARDS second to MFPNA   - Hx of BEA on CPAP presented to  post URI with SOB and found with AHRF with progression to ARDS second to post viral MFPNA   - Intubated 2/25   - Cannulated for vvECMO 2/26   - s/p 60XLTCP tracheostomy 3/7   - Decannulated 3/7   - CTSx removed tracheostomy and ECMO sutures on 3/17   - Continue on TC QD with PMV as able with strong phonation  - Continue on PS 18/10/40 QHS given OHS   - Continue on nebs and HTS   -  trials continue and now trialing overnight with BIPAP for OHS (10/8/12)  - trend VBG QD    GI  # Dysphagia   - s/p PEG 3/7   - Attempted green dye on 3/13 and failed  - Continue on TF  - Continue on miralax and senna  - RPT green dye passed 3/19  - 3/20 Passed FEEST - on soft bite sized with thin liquids     # Mild transaminitis   - LFTs elevated in ICU with TBILI elevated likely from ECMO hemolysis   - US ABD with hepatic steatosis   - Trend LFTs      RENAL  # ELLA   - ELLA likely from cardiorenal with RVE   - Diuresed in ICU and improved   - Monitor renal function and UOP     # Hypernatremia   - Hypernatremia with fever spikes   - Fever curve improved and attempting to wean FWF   - Continue on  Q6H (decreased 3/19)    INFECTIOUS DISEASE  # Recurrent fevers with fungemia from unknown source   - Recurrent fevers post ECMO decannulation thought initially to be cytokine surge   - BCx, SCx, UCx, RVP and MRSA RPT on 3/12 negative   - Completed zosyn (3/12-3/18) empirically with improved WBC , however recurrent fevers noted.   - RPT SCx, UA, CXR and RVP negative  - TTE 3/17 with no IE  - PanCT 3/17 with PNA and persistent panniculitis   - BCx 3/15 and 3/16 with candida albicans.   - Concern for possible source from panniculitis   - Continue on caspo (3/17 - ) and RPT BCx 3/18 negative  - ID following  - plan for 2 week course after negative BCx to 4/2    # MFPNA and abdominal pannus cellulitis   - Presented to  post URI with SOB and found with AHRF with progression to ARDS second to post viral MFPNA   - RVP, legionella, strept, BAL, BCx, UCx, HIV, PCP, and adenovirus PCR negative   - Initially started on multiple agents in ICU and ultimatelty completed zosyn (2/26-3/9) ZMAX (2/25-2/26) and vanco (2/26-3/1)     # Penile discharge   - GC/ chlamydia negative   - HIV negative   - Monitor for now    # PPX   - MRSA PCR positive and completed bactroban (2/26-3/3)     HEME   # Anemia likely second to ECMO circuit vs AOCD   - HH low in ICU second to ECMO circuit   - HH dropping again today however no bleeding noted   - Microcytic and hemochromic, sent anemia panel 3/19  - Trend      VASCULAR   # R IJ and BL LE DVTs   - Post ECMO dopplers on 3/9 negative for BL UE/ LE DVTs.   - Subsequent post ECMO dopplers performed on 3/14 and noted with acute, non-occlusive deep vein thrombosis noted within the right internal jugular vein. acute, occlusive deep vein thromboses noted within the right and left peroneal veins at both mid calves, and age-indeterminate, occlusive deep vein thrombosis visualized within the left gastrocnemius vein at the proximal calf.     - Continue on argatroban GTT and change to eliquis     PODIATRY   # BL plantar feet blisters likely pressors induced  - Seen by podiatry and blisters lanced on 3/4 and again on 3/18  - Continue on local wound care per podiatry   - Podiatry following  - OK for WBAT will fu with PT for offloading shoe if appropriate    ENDOCRINE  # Pre-DM2   - A1C 6.7  - Continue on ISS   - Monitor FS   - IF no demand then stop ISS     LINES/ TUBES  - R IJ and R FEM ECMO (2/26-3/7)     SKIN  # Pannus canndial intertrigo   # Sacrum/ buttock MAD  - Continue on clotrimazole cream   - WOC appreciated   -Seen by WC pt noted to have oozing from Buttock wound surgicel placed by wound care Follow up in 2 hours and dressing is     ETHICS/ GOC    - FULL CODE     DISPO - PT and PMR recc FLORENCE, however will continue to reassess for acute Seen by PMR and goal for acute rehab

## 2025-03-26 NOTE — PROGRESS NOTE ADULT - SUBJECTIVE AND OBJECTIVE BOX
Patient is a 39y old  Male who presents with a chief complaint of sob (02 Mar 2025 06:19)    Interval history:  Denies new complaint, reports continued leg pain and tingling    REVIEW OF SYSTEMS  weakness  pain  numbness/tingling    PAST MEDICAL & SURGICAL HISTORY  HTN (hypertension)    Atrial fibrillation    Papilledema of both eyes    Chronic sinusitis    Morbid obesity    No significant past surgical history       CURRENT FUNCTIONAL STATUS  3/25   Bed Mobility  Bed Mobility Training Rehab Potential: good, to achieve stated therapy goals  Bed Mobility Training Supine-to-Sit: moderate assist (50% patient effort);  2 person assist;  nonverbal cues (demo/gestures);  verbal cues;  bed rails;  mechanical lift utilized   Bed Mobility Training Limitations: impaired ability to control trunk for mobility;  decreased ability to use legs for bridging/pushing;  decreased ability to use arms for pushing/pulling;  decreased strength;  impaired balance;  impaired postural control    Bed-Chair Transfer Training  Bed-to-Chair Transfer Training Rehab Potential: good, to achieve stated therapy goals  Transfer Training Bed-to-Chair Transfer: dependent (less than 25% patient effort);  1 person + 1 person to manage equipment;  nonverbal cues (demo/gestures);  verbal cues;  full weight-bearing   mechanical/dependent lift   Bed-to-Chair Transfer Training Transfer Safety Analysis: decreased weight-shifting ability;  decreased balance;  decreased strength;  impaired balance;  impaired postural control    Therapeutic Exercise  Therapeutic Exercise Rehab Effort: good  Therapeutic Exercise Detail: Patient tolerated seated anterior/posterior and lateral weightshifts well.              RECENT LABS/IMAGING  CBC Full  -  ( 26 Mar 2025 05:25 )  WBC Count : 8.29 K/uL  RBC Count : 2.63 M/uL  Hemoglobin : 7.7 g/dL  Hematocrit : 23.1 %  Platelet Count - Automated : 489 K/uL  Mean Cell Volume : 87.8 fL  Mean Cell Hemoglobin : 29.3 pg  Mean Cell Hemoglobin Concentration : 33.3 g/dL  Auto Neutrophil # : x  Auto Lymphocyte # : x  Auto Monocyte # : x  Auto Eosinophil # : x  Auto Basophil # : x  Auto Neutrophil % : x  Auto Lymphocyte % : x  Auto Monocyte % : x  Auto Eosinophil % : x  Auto Basophil % : x    03-26    139  |  91[L]  |  5[L]  ----------------------------<  95  3.9   |  38[H]  |  0.79    Ca    9.0      26 Mar 2025 05:25  Phos  5.5     03-26  Mg     1.70     03-26      Urinalysis Basic - ( 26 Mar 2025 05:25 )    Color: x / Appearance: x / SG: x / pH: x  Gluc: 95 mg/dL / Ketone: x  / Bili: x / Urobili: x   Blood: x / Protein: x / Nitrite: x   Leuk Esterase: x / RBC: x / WBC x   Sq Epi: x / Non Sq Epi: x / Bacteria: x        VITALS  T(C): 37.2 (03-26-25 @ 04:00), Max: 37.2 (03-25-25 @ 21:30)  HR: 106 (03-26-25 @ 07:59) (97 - 120)  BP: 118/75 (03-26-25 @ 04:00) (103/77 - 126/76)  RR: 25 (03-26-25 @ 04:00) (20 - 25)  SpO2: 100% (03-26-25 @ 07:59) (95% - 100%)  Wt(kg): --    ALLERGIES  No Known Allergies      MEDICATIONS   acetaminophen     Tablet .. 650 milliGRAM(s) Oral every 6 hours PRN  acetaZOLAMIDE Injectable 250 milliGRAM(s) IV Push once  albuterol/ipratropium for Nebulization 3 milliLiter(s) Nebulizer every 6 hours  amLODIPine   Tablet 10 milliGRAM(s) Oral daily  apixaban 5 milliGRAM(s) Oral every 12 hours  caspofungin IVPB 70 milliGRAM(s) IV Intermittent every 24 hours  chlorhexidine 0.12% Liquid 15 milliLiter(s) Swish and Spit two times a day  chlorhexidine 2% Cloths 1 Application(s) Topical <User Schedule>  cloNIDine 0.2 milliGRAM(s) Oral every 8 hours  clotrimazole 1% Cream 1 Application(s) Topical two times a day  dextrose 50% Injectable 25 Gram(s) IV Push once  dextrose 50% Injectable 12.5 Gram(s) IV Push once  dextrose 50% Injectable 25 Gram(s) IV Push once  furosemide    Tablet 40 milliGRAM(s) Oral two times a day  gabapentin 300 milliGRAM(s) Oral every 8 hours  glucagon  Injectable 1 milliGRAM(s) IntraMuscular once  HYDROmorphone  Injectable 0.5 milliGRAM(s) IV Push every 6 hours PRN  insulin lispro (ADMELOG) corrective regimen sliding scale   SubCutaneous three times a day before meals  melatonin 3 milliGRAM(s) Oral at bedtime  multivitamin/minerals/iron Oral Solution (CENTRUM) 15 milliLiter(s) Oral daily  oxycodone    5 mG/acetaminophen 325 mG 1 Tablet(s) Oral every 4 hours PRN  pantoprazole    Tablet 40 milliGRAM(s) Oral before breakfast  polyethylene glycol 3350 17 Gram(s) Oral daily  QUEtiapine 25 milliGRAM(s) Oral at bedtime  senna Syrup 5 milliLiter(s) Oral at bedtime      ----------------------------------------------------------------------------------------  PHYSICAL EXAM  Constitutional - NAD   HEENT - +  on trach  Chest - no respiratory distress  Cardiovascular -mild tachy  Abdomen - +PEG  Extremities - dressings b/l feet  Neurologic Exam -                    Cognitive - Awake, Alert, AAO to self, place, date, year, situation      Communication - fluent     Cranial Nerves - CN 2-12 intact     Motor -                     LEFT    UE - 4+/5                    RIGHT UE - 4+/5                    LEFT    LE -  2+/5 however dorsiflexion 0/5                    RIGHT LE -  2-/4     Sensory - Intact to LT       Balance - WNL Static  Psychiatric - Mood stable, Affect WNL  ----------------------------------------------------------------------------------------  ASSESSMENT/PLAN  40 yo m PMHx of morbid obesity, BEA on CPAP, pAFIB (not on AC), HTN, and BL papilledema attributed to pseudotumor cerebri who presented initially to [facility] on 2/24 with SOB and BL LE edema. Found with URI outpatient with progressive SOB, and concern for noted for MFPNA on imaging. Course progressed with AHRF with worsening O2 demand, respiratory distress, secretions, and somnolence requiring intubation (difficult with success after 6 attempts) in ED and admission to [facility] MICU. While in MICU, patient noted with increasing O2 demand with no improvement despite optimal vent management. Concern noted for progressive ARDS, unable to prone given morbid obesity, and ultimately cannulated for vvECMO on 2/25 and transferred to [facility] for further management on 2/26.   transferred to [facility] 3/11, course complicated by fever. afebrile past 48 hours  s/p trach and peg 3/7  cleared for soft bite size diet with thin liquids (must wear one way speaking valve with all meals)  continue bedside PT and OT    podiatry following for vasopressor associated wounds, receiving local wound care. wbat with offloading shoe  Pain - acetaminphen, gabapentin 300mg q8    DVT PPX - on eliquis  Rehab -  tolerated bedside therapy, goal is for acute rehab when medically cleared.  Patient can tolerate 3 hours per day of therapy with medical supervision      Total time spent to review relevant records and imaging results, examine patient, complete documentation, and when applicable discuss the case with the patient, family, , social workers, and medical team: 35  minutes

## 2025-03-26 NOTE — PROGRESS NOTE ADULT - SUBJECTIVE AND OBJECTIVE BOX
CHIEF COMPLAINT: Patient is a 39y old  Male who presents with a chief complaint of sob (02 Mar 2025 06:19)      INTERVAL EVENTS: Still with neuropathic pain intermittently     ROS: Seen by bedside during AM rounds     OBJECTIVE:  ICU Vital Signs Last 24 Hrs  T(C): 37.2 (26 Mar 2025 04:00), Max: 37.2 (25 Mar 2025 21:30)  T(F): 99 (26 Mar 2025 04:00), Max: 99 (26 Mar 2025 04:00)  HR: 115 (26 Mar 2025 04:00) (97 - 120)  BP: 118/75 (26 Mar 2025 04:00) (103/77 - 126/76)  BP(mean): --  ABP: --  ABP(mean): --  RR: 25 (26 Mar 2025 04:00) (20 - 25)  SpO2: 96% (26 Mar 2025 04:00) (95% - 100%)    O2 Parameters below as of 26 Mar 2025 04:00  Patient On (Oxygen Delivery Method): nasal cannula  O2 Flow (L/min): 4            03-25 @ 07:01  -  03-26 @ 07:00  --------------------------------------------------------  IN: 100 mL / OUT: 5300 mL / NET: -5200 mL      CAPILLARY BLOOD GLUCOSE      POCT Blood Glucose.: 103 mg/dL (26 Mar 2025 07:24)      PHYSICAL EXAM:  General: NAD, sitting in chair  HEENT: +TC with , able to phonate  Respiratory: lungs CTA b/l, no rales/rhonchi or wheeze, normal respiratory effort  Cardiovascular: +sinus tachycardia  Abdomen: soft, non tender, non distended, +PEG  Extremities: b/l LE in c/d/i dressings  Skin: warm, dry  Neurological: AAO x 4, awake and alert, follows commands, no gross focal deficits, moving extremities freely   Psychiatry: normal affect and behavior        HOSPITAL MEDICATIONS:  MEDICATIONS  (STANDING):  albuterol/ipratropium for Nebulization 3 milliLiter(s) Nebulizer every 6 hours  amLODIPine   Tablet 10 milliGRAM(s) Oral daily  apixaban 5 milliGRAM(s) Oral every 12 hours  caspofungin IVPB 70 milliGRAM(s) IV Intermittent every 24 hours  chlorhexidine 0.12% Liquid 15 milliLiter(s) Swish and Spit two times a day  chlorhexidine 2% Cloths 1 Application(s) Topical <User Schedule>  cloNIDine 0.2 milliGRAM(s) Oral every 8 hours  clotrimazole 1% Cream 1 Application(s) Topical two times a day  dextrose 50% Injectable 25 Gram(s) IV Push once  dextrose 50% Injectable 12.5 Gram(s) IV Push once  dextrose 50% Injectable 25 Gram(s) IV Push once  furosemide    Tablet 40 milliGRAM(s) Oral two times a day  gabapentin 300 milliGRAM(s) Oral every 8 hours  glucagon  Injectable 1 milliGRAM(s) IntraMuscular once  insulin lispro (ADMELOG) corrective regimen sliding scale   SubCutaneous three times a day before meals  melatonin 3 milliGRAM(s) Oral at bedtime  multivitamin/minerals/iron Oral Solution (CENTRUM) 15 milliLiter(s) Oral daily  pantoprazole    Tablet 40 milliGRAM(s) Oral before breakfast  polyethylene glycol 3350 17 Gram(s) Oral daily  QUEtiapine 25 milliGRAM(s) Oral at bedtime  senna Syrup 5 milliLiter(s) Oral at bedtime    MEDICATIONS  (PRN):  acetaminophen     Tablet .. 650 milliGRAM(s) Oral every 6 hours PRN Temp greater or equal to 38C (100.4F), Mild Pain (1 - 3), Moderate Pain (4 - 6)  HYDROmorphone  Injectable 0.5 milliGRAM(s) IV Push every 6 hours PRN Severe Pain (7 - 10)  oxycodone    5 mG/acetaminophen 325 mG 1 Tablet(s) Oral every 4 hours PRN Moderate Pain (4 - 6)      LABS:                        7.7    8.29  )-----------( 489      ( 26 Mar 2025 05:25 )             23.1     03-25    139  |  90[L]  |  5[L]  ----------------------------<  90  4.1   |  34[H]  |  0.89    Ca    9.0      25 Mar 2025 05:11  Phos  5.3     03-25  Mg     1.80     03-25        Urinalysis Basic - ( 25 Mar 2025 05:11 )    Color: x / Appearance: x / SG: x / pH: x  Gluc: 90 mg/dL / Ketone: x  / Bili: x / Urobili: x   Blood: x / Protein: x / Nitrite: x   Leuk Esterase: x / RBC: x / WBC x   Sq Epi: x / Non Sq Epi: x / Bacteria: x        Venous Blood Gas:  03-26 @ 06:25  7.42/65/73/42/94.7  VBG Lactate: 1.0  Venous Blood Gas:  03-25 @ 05:11  7.34/79/67/43/90.7  VBG Lactate: 2.3   CHIEF COMPLAINT: Patient is a 39y old  Male who presents with a chief complaint of sob (02 Mar 2025 06:19)      INTERVAL EVENTS: Still with neuropathic pain intermittently     ROS: Seen by bedside during AM rounds     OBJECTIVE:  ICU Vital Signs Last 24 Hrs  T(C): 37.2 (26 Mar 2025 04:00), Max: 37.2 (25 Mar 2025 21:30)  T(F): 99 (26 Mar 2025 04:00), Max: 99 (26 Mar 2025 04:00)  HR: 115 (26 Mar 2025 04:00) (97 - 120)  BP: 118/75 (26 Mar 2025 04:00) (103/77 - 126/76)  BP(mean): --  ABP: --  ABP(mean): --  RR: 25 (26 Mar 2025 04:00) (20 - 25)  SpO2: 96% (26 Mar 2025 04:00) (95% - 100%)    O2 Parameters below as of 26 Mar 2025 04:00  Patient On (Oxygen Delivery Method): nasal cannula  O2 Flow (L/min): 4            03-25 @ 07:01  -  03-26 @ 07:00  --------------------------------------------------------  IN: 100 mL / OUT: 5300 mL / NET: -5200 mL      CAPILLARY BLOOD GLUCOSE      POCT Blood Glucose.: 103 mg/dL (26 Mar 2025 07:24)      PHYSICAL EXAM:  General: NAD, in bed with wound care evaluating   HEENT: +TC with , able to phonate  Respiratory: lungs CTA b/l, no rales/rhonchi or wheeze, normal respiratory effort  Cardiovascular: +sinus tachycardia  Abdomen: soft, non tender, non distended, +PEG  Extremities: b/l LE in c/d/i dressings  Skin: warm, dry + open sacral wound with blood oozing   Neurological: AAO x 4, awake and alert, follows commands, no gross focal deficits, moving extremities freely   Psychiatry: normal affect and behavior        HOSPITAL MEDICATIONS:  MEDICATIONS  (STANDING):  albuterol/ipratropium for Nebulization 3 milliLiter(s) Nebulizer every 6 hours  amLODIPine   Tablet 10 milliGRAM(s) Oral daily  apixaban 5 milliGRAM(s) Oral every 12 hours  caspofungin IVPB 70 milliGRAM(s) IV Intermittent every 24 hours  chlorhexidine 0.12% Liquid 15 milliLiter(s) Swish and Spit two times a day  chlorhexidine 2% Cloths 1 Application(s) Topical <User Schedule>  cloNIDine 0.2 milliGRAM(s) Oral every 8 hours  clotrimazole 1% Cream 1 Application(s) Topical two times a day  dextrose 50% Injectable 25 Gram(s) IV Push once  dextrose 50% Injectable 12.5 Gram(s) IV Push once  dextrose 50% Injectable 25 Gram(s) IV Push once  furosemide    Tablet 40 milliGRAM(s) Oral two times a day  gabapentin 300 milliGRAM(s) Oral every 8 hours  glucagon  Injectable 1 milliGRAM(s) IntraMuscular once  insulin lispro (ADMELOG) corrective regimen sliding scale   SubCutaneous three times a day before meals  melatonin 3 milliGRAM(s) Oral at bedtime  multivitamin/minerals/iron Oral Solution (CENTRUM) 15 milliLiter(s) Oral daily  pantoprazole    Tablet 40 milliGRAM(s) Oral before breakfast  polyethylene glycol 3350 17 Gram(s) Oral daily  QUEtiapine 25 milliGRAM(s) Oral at bedtime  senna Syrup 5 milliLiter(s) Oral at bedtime    MEDICATIONS  (PRN):  acetaminophen     Tablet .. 650 milliGRAM(s) Oral every 6 hours PRN Temp greater or equal to 38C (100.4F), Mild Pain (1 - 3), Moderate Pain (4 - 6)  HYDROmorphone  Injectable 0.5 milliGRAM(s) IV Push every 6 hours PRN Severe Pain (7 - 10)  oxycodone    5 mG/acetaminophen 325 mG 1 Tablet(s) Oral every 4 hours PRN Moderate Pain (4 - 6)      LABS:                        7.7    8.29  )-----------( 489      ( 26 Mar 2025 05:25 )             23.1     03-25    139  |  90[L]  |  5[L]  ----------------------------<  90  4.1   |  34[H]  |  0.89    Ca    9.0      25 Mar 2025 05:11  Phos  5.3     03-25  Mg     1.80     03-25        Urinalysis Basic - ( 25 Mar 2025 05:11 )    Color: x / Appearance: x / SG: x / pH: x  Gluc: 90 mg/dL / Ketone: x  / Bili: x / Urobili: x   Blood: x / Protein: x / Nitrite: x   Leuk Esterase: x / RBC: x / WBC x   Sq Epi: x / Non Sq Epi: x / Bacteria: x        Venous Blood Gas:  03-26 @ 06:25  7.42/65/73/42/94.7  VBG Lactate: 1.0  Venous Blood Gas:  03-25 @ 05:11  7.34/79/67/43/90.7  VBG Lactate: 2.3

## 2025-03-26 NOTE — PROGRESS NOTE ADULT - SUBJECTIVE AND OBJECTIVE BOX
[facility]-- WOUND TEAM -- FOLLOW UP NOTE  --------------------------------------------------------------------------------    subjective: Patient seen and examined at bedside.     Interval HPI/24 hour events:   -->ID continues to follow up for fungemia   -->Appreciate nutrition services consult   -->Per notes, neuropathic pain intermittently, pain medication regimen per primary team   -->Rec cap trials now with BIPAP at night time   Chart reviewed including labs and relevant images      Diet:  Diet, Soft and Bite Sized:   Supplement Feeding Modality:  Oral  Ensure Max Cans or Servings Per Day:  1       Frequency:  Two Times a day (03-24-25 @ 16:03) [Active]    ROS: General/ SKIN/ see HPI  all other systems negative      ALLERGIES & MEDICATIONS  --------------------------------------------------------------------------------  Allergies    No Known Allergies    Intolerances    STANDING INPATIENT MEDICATIONS    albuterol/ipratropium for Nebulization 3 milliLiter(s) Nebulizer every 6 hours  amLODIPine   Tablet 10 milliGRAM(s) Oral daily  apixaban 5 milliGRAM(s) Oral every 12 hours  caspofungin IVPB 70 milliGRAM(s) IV Intermittent every 24 hours  chlorhexidine 0.12% Liquid 15 milliLiter(s) Swish and Spit two times a day  chlorhexidine 2% Cloths 1 Application(s) Topical <User Schedule>  cloNIDine 0.2 milliGRAM(s) Oral every 8 hours  clotrimazole 1% Cream 1 Application(s) Topical two times a day  dextrose 50% Injectable 25 Gram(s) IV Push once  dextrose 50% Injectable 12.5 Gram(s) IV Push once  dextrose 50% Injectable 25 Gram(s) IV Push once  furosemide    Tablet 40 milliGRAM(s) Oral two times a day  gabapentin 400 milliGRAM(s) Oral every 8 hours  glucagon  Injectable 1 milliGRAM(s) IntraMuscular once  insulin lispro (ADMELOG) corrective regimen sliding scale   SubCutaneous three times a day before meals  melatonin 3 milliGRAM(s) Oral at bedtime  multivitamin/minerals/iron Oral Solution (CENTRUM) 15 milliLiter(s) Oral daily  pantoprazole    Tablet 40 milliGRAM(s) Oral before breakfast  polyethylene glycol 3350 17 Gram(s) Oral daily  QUEtiapine 25 milliGRAM(s) Oral at bedtime  senna Syrup 5 milliLiter(s) Oral at bedtime    PRN INPATIENT MEDICATION  acetaminophen     Tablet .. 650 milliGRAM(s) Oral every 6 hours PRN  oxycodone    5 mG/acetaminophen 325 mG 1 Tablet(s) Oral every 4 hours PRN    Vital signs:  T(C): 37.2 (03-26-25 @ 04:00), Max: 37.2 (03-25-25 @ 21:30)  HR: 112 (03-26-25 @ 11:41) (97 - 120)  BP: 118/75 (03-26-25 @ 04:00) (103/77 - 126/76)  RR: 25 (03-26-25 @ 04:00) (20 - 25)  SpO2: 95% (03-26-25 @ 11:41) (95% - 100%)    Wt(kg): --    03-25-25 @ 07:01  -  03-26-25 @ 07:00  --------------------------------------------------------  IN: 100 mL / OUT: 5300 mL / NET: -5200 mL    Constitutional: NAD/Alert, AOX3. Patient assessed with primary RN  Morbid Obese   (+) low airloss support surface (Compella bed), (+) fluidized positioning devices, heels elevated with large Z fluidized positioning device , bariatric seat cushion in chair   HEENT: NC/AT, non icteric, mucosa moist, throat clear, trachea midline, neck supple  Cardiovascular: tachy   Respiratory: Trach in place  in place, patient receiving supplemental oxygen via nasal cannula   Gastrointestinal: soft NT/ND. obese, (+) flatus passing   Increase moisture in ABD pannus and bilateral groin, Interdry textile dressings replaced   : penile pouch in place   Neurology: follows commands, functional paraplegic  Musculoskeletal:  limited aROM to lower extremities, able to hold on to rail when turned , no deformities/ contractures  Vascular: Intact dry dressing in place to bilateral feet  Skin:  moist w/ good turgor  Right forearm with area of localized erythema and mild induration/hematoma?, no bogginess, no fluctuance, no tenderness   Sacrum/gluteal cleft and bilateral buttocks with full thickness wound exposing mixed tissue type, gluteal cleft area wound partially obscured in patients natural anatomical position. Difficult to stretch open given patients body habitus   -Measurements taking separately given patient habitus difficult to measure as one   -Left buttock cluster measuring- 5yun2miu8.2cm   -Gluteal cleft to right buttock- 66nwq8mnn2.2cm   -Gluteal cleft exposing fibrinous yellow/slough/fibrinous tissue, right buttock with pink moist dermis and areas of re-epithelization,, scattered fibrinous tissue. Wound  friable with cleansing. No active bleeding  Psych: calm and cooperative, emotional support provided during turning process       LABS/ CULTURES/ RADIOLOGY:              7.7    8.29  >-----------<  489      [03-26-25 @ 05:25]              23.1     139  |  91  |  5   ----------------------------<  95      [03-26-25 @ 05:25]  3.9   |  38  |  0.79        Ca     9.0     [03-26-25 @ 05:25]      Mg     1.70     [03-26-25 @ 05:25]      Phos  5.5     [03-26-25 @ 05:25]      Blood Gas Calcium, Ionized - Venous: 1.23 mmol/L (03-26-25 @ 06:25)  Blood Gas Calcium, Ionized - Venous: 1.23 mmol/L (03-25-25 @ 05:11)      CAPILLARY BLOOD GLUCOSE      POCT Blood Glucose.: 123 mg/dL (26 Mar 2025 11:06)  POCT Blood Glucose.: 103 mg/dL (26 Mar 2025 07:24)  POCT Blood Glucose.: 96 mg/dL (25 Mar 2025 21:19)  POCT Blood Glucose.: 108 mg/dL (25 Mar 2025 17:28)    Triglycerides, Serum: 169 mg/dL (03-08-25 @ 00:18)  Triglycerides, Serum: 187 mg/dL (03-07-25 @ 06:00)  Triglycerides, Serum: 120 mg/dL (03-06-25 @ 04:00)  Triglycerides, Serum: 155 mg/dL (03-05-25 @ 03:30)  Triglycerides, Serum: 147 mg/dL (03-04-25 @ 03:45)    Culture - Blood (collected 03-20-25 @ 17:00)  Source: Blood Blood-Peripheral  Final Report (03-25-25 @ 22:00):    No growth at 5 days    Culture - Blood (collected 03-20-25 @ 15:50)  Source: Blood Blood-Venous  Final Report (03-25-25 @ 22:00):    No growth at 5 days          A1C with Estimated Average Glucose Result: 6.7 % (02-25-25 @ 05:44)             [facility]-- WOUND TEAM -- FOLLOW UP NOTE  --------------------------------------------------------------------------------    subjective: Patient seen and examined at bedside. Patient endorses he feels well, denies back pain, reports pain in bilateral feet. Patient denies SOB.     Interval HPI/24 hour events:   -->ID continues to follow up for fungemia   -->3/20, s/p bedside swallow assessment, diet advanced to soft and bite size with ensure max cans   -->Appreciate nutrition services consult   -->Per notes, neuropathic pain intermittently, pain medication regimen per primary team, patient receiving gabapentin   -->Rec cap trials now with BIPAP at night time   -->3/20, rehab medicine consulted   -->Intermittent low grade temps   -->Bariatric seat cushion provided   Chart reviewed including labs and relevant images    Diet:  Diet, Soft and Bite Sized:   Supplement Feeding Modality:  Oral  Ensure Max Cans or Servings Per Day:  1       Frequency:  Two Times a day (03-24-25 @ 16:03) [Active]    ROS: General/ SKIN/ see HPI  all other systems negative    ALLERGIES & MEDICATIONS  --------------------------------------------------------------------------------  Allergies    No Known Allergies    Intolerances    STANDING INPATIENT MEDICATIONS    albuterol/ipratropium for Nebulization 3 milliLiter(s) Nebulizer every 6 hours  amLODIPine   Tablet 10 milliGRAM(s) Oral daily  apixaban 5 milliGRAM(s) Oral every 12 hours  caspofungin IVPB 70 milliGRAM(s) IV Intermittent every 24 hours  chlorhexidine 0.12% Liquid 15 milliLiter(s) Swish and Spit two times a day  chlorhexidine 2% Cloths 1 Application(s) Topical <User Schedule>  cloNIDine 0.2 milliGRAM(s) Oral every 8 hours  clotrimazole 1% Cream 1 Application(s) Topical two times a day  dextrose 50% Injectable 25 Gram(s) IV Push once  dextrose 50% Injectable 12.5 Gram(s) IV Push once  dextrose 50% Injectable 25 Gram(s) IV Push once  furosemide    Tablet 40 milliGRAM(s) Oral two times a day  gabapentin 400 milliGRAM(s) Oral every 8 hours  glucagon  Injectable 1 milliGRAM(s) IntraMuscular once  insulin lispro (ADMELOG) corrective regimen sliding scale   SubCutaneous three times a day before meals  melatonin 3 milliGRAM(s) Oral at bedtime  multivitamin/minerals/iron Oral Solution (CENTRUM) 15 milliLiter(s) Oral daily  pantoprazole    Tablet 40 milliGRAM(s) Oral before breakfast  polyethylene glycol 3350 17 Gram(s) Oral daily  QUEtiapine 25 milliGRAM(s) Oral at bedtime  senna Syrup 5 milliLiter(s) Oral at bedtime    PRN INPATIENT MEDICATION  acetaminophen     Tablet .. 650 milliGRAM(s) Oral every 6 hours PRN  oxycodone    5 mG/acetaminophen 325 mG 1 Tablet(s) Oral every 4 hours PRN    Vital signs:  T(C): 37.2 (03-26-25 @ 04:00), Max: 37.2 (03-25-25 @ 21:30)  HR: 112 (03-26-25 @ 11:41) (97 - 120)  BP: 118/75 (03-26-25 @ 04:00) (103/77 - 126/76)  RR: 25 (03-26-25 @ 04:00) (20 - 25)  SpO2: 95% (03-26-25 @ 11:41) (95% - 100%)    Wt(kg): --202.1 (3-23-25)     03-25-25 @ 07:01  -  03-26-25 @ 07:00  --------------------------------------------------------  IN: 100 mL / OUT: 5300 mL / NET: -5200 mL    Constitutional: NAD/Alert, AOX3. Patient assessed with primary RN  Morbid Obese   (+) low airloss support surface (Compella bed), (+) fluidized positioning devices, heels elevated with large Z fluidized positioning device , bariatric seat cushion in chair   HEENT: NC/AT, non icteric, mucosa moist, throat clear, trachea midline, neck supple  Cardiovascular: tachy   Respiratory: Trach in place  in place, patient receiving supplemental oxygen via nasal cannula, intact posterior ears, medical device offloading (Posey) in place   Gastrointestinal: soft NT/ND. obese, (+) flatus passing   Increase moisture in ABD pannus and bilateral groin, Interdry textile dressings replaced   : penile pouch in place   Neurology: follows commands, functional paraplegic  Musculoskeletal:  limited aROM to lower extremities, able to hold on to rail when turned , no deformities/ contractures  Vascular: Intact dry dressings in place to bilateral feet  Skin:  moist w/ good turgor  Right forearm with area of localized erythema and mild induration/hematoma? erythema resolved, small hematoma palpable   Sacrum/gluteal cleft and bilateral buttocks with full thickness wound exposing mixed tissue type, gluteal cleft area wound partially obscured in patients natural anatomical position. Difficult to stretch gluteal cleft open given patients body habitus   -Measurements taking separately given patient habitus difficult to measure as one   -Left buttock cluster measuring- 3cmx2.5cmx0.2cm (prev 0cbp5ata2.2cm),  from main wound by re-epithelization bridge   -Gluteal cleft to right buttock- [id number] (prev 41rxe9pcr9.2cm), large partial thickness wound in right buttock included in measurements   -Gluteal cleft exposing fibrinous yellow/slough/fibrinous tissue and gray moist tan fixed eschar, right buttock with pink moist dermis and yellow fibrinous tissue and areas of re-epithelization. Wound friable, distal gluteal cleft with some oozing of sanguinous drainage, pressure held for less than 5 minutes with dry gauze. Bleeding subsided. Patient turned to right side and no longer observed bleeding. Hemostatic dressing placed over gluteal cleft area and TRIAD applied to buttocks     ACP Bharti called to bedside to assess bleeding from wound, discussed plan at the bedside   Psych: calm and cooperative      LABS/ CULTURES/ RADIOLOGY:              7.7    8.29  >-----------<  489      [03-26-25 @ 05:25]              23.1     139  |  91  |  5   ----------------------------<  95      [03-26-25 @ 05:25]  3.9   |  38  |  0.79        Ca     9.0     [03-26-25 @ 05:25]      Mg     1.70     [03-26-25 @ 05:25]      Phos  5.5     [03-26-25 @ 05:25]      Blood Gas Calcium, Ionized - Venous: 1.23 mmol/L (03-26-25 @ 06:25)  Blood Gas Calcium, Ionized - Venous: 1.23 mmol/L (03-25-25 @ 05:11)      CAPILLARY BLOOD GLUCOSE      POCT Blood Glucose.: 123 mg/dL (26 Mar 2025 11:06)  POCT Blood Glucose.: 103 mg/dL (26 Mar 2025 07:24)  POCT Blood Glucose.: 96 mg/dL (25 Mar 2025 21:19)  POCT Blood Glucose.: 108 mg/dL (25 Mar 2025 17:28)    Triglycerides, Serum: 169 mg/dL (03-08-25 @ 00:18)  Triglycerides, Serum: 187 mg/dL (03-07-25 @ 06:00)  Triglycerides, Serum: 120 mg/dL (03-06-25 @ 04:00)  Triglycerides, Serum: 155 mg/dL (03-05-25 @ 03:30)  Triglycerides, Serum: 147 mg/dL (03-04-25 @ 03:45)    Culture - Blood (collected 03-20-25 @ 17:00)  Source: Blood Blood-Peripheral  Final Report (03-25-25 @ 22:00):    No growth at 5 days    Culture - Blood (collected 03-20-25 @ 15:50)  Source: Blood Blood-Venous  Final Report (03-25-25 @ 22:00):    No growth at 5 days    A1C with Estimated Average Glucose Result: 6.7 % (02-25-25 @ 05:44)             [facility]-- WOUND TEAM -- FOLLOW UP NOTE  --------------------------------------------------------------------------------    subjective: Patient seen and examined at bedside. Patient resting comfortable watching TV Patient endorses he feels well, denies back pain or pain associated with sacrum/buttocks wounds,  reports pain in bilateral feet. Patient denies SOB.     Interval HPI/24 hour events:   -->ID continues to follow up for fungemia   -->3/20, s/p bedside swallow assessment, diet advanced to soft and bite size with ensure max cans   -->Appreciate nutrition services consult   -->Per notes, neuropathic pain intermittently, pain medication regimen per primary team, patient receiving gabapentin   -->Rec cap trials now with BIPAP at night time   -->3/20, rehab medicine consulted   -->Intermittent low grade temps   -->Bariatric seat cushion provided   Chart reviewed including labs and relevant images    Diet:  Diet, Soft and Bite Sized:   Supplement Feeding Modality:  Oral  Ensure Max Cans or Servings Per Day:  1, Frequency:  Two Times a day (03-24-25 @ 16:03) [Active]    ROS: General/ SKIN/ see HPI  all other systems negative    ALLERGIES & MEDICATIONS  --------------------------------------------------------------------------------  Allergies    No Known Allergies    Intolerances    STANDING INPATIENT MEDICATIONS    albuterol/ipratropium for Nebulization 3 milliLiter(s) Nebulizer every 6 hours  amLODIPine   Tablet 10 milliGRAM(s) Oral daily  apixaban 5 milliGRAM(s) Oral every 12 hours  caspofungin IVPB 70 milliGRAM(s) IV Intermittent every 24 hours  chlorhexidine 0.12% Liquid 15 milliLiter(s) Swish and Spit two times a day  chlorhexidine 2% Cloths 1 Application(s) Topical <User Schedule>  cloNIDine 0.2 milliGRAM(s) Oral every 8 hours  clotrimazole 1% Cream 1 Application(s) Topical two times a day  dextrose 50% Injectable 25 Gram(s) IV Push once  dextrose 50% Injectable 12.5 Gram(s) IV Push once  dextrose 50% Injectable 25 Gram(s) IV Push once  furosemide    Tablet 40 milliGRAM(s) Oral two times a day  gabapentin 400 milliGRAM(s) Oral every 8 hours  glucagon  Injectable 1 milliGRAM(s) IntraMuscular once  insulin lispro (ADMELOG) corrective regimen sliding scale   SubCutaneous three times a day before meals  melatonin 3 milliGRAM(s) Oral at bedtime  multivitamin/minerals/iron Oral Solution (CENTRUM) 15 milliLiter(s) Oral daily  pantoprazole    Tablet 40 milliGRAM(s) Oral before breakfast  polyethylene glycol 3350 17 Gram(s) Oral daily  QUEtiapine 25 milliGRAM(s) Oral at bedtime  senna Syrup 5 milliLiter(s) Oral at bedtime    PRN INPATIENT MEDICATION  acetaminophen     Tablet .. 650 milliGRAM(s) Oral every 6 hours PRN  oxycodone    5 mG/acetaminophen 325 mG 1 Tablet(s) Oral every 4 hours PRN    Vital signs:  T(C): 37.2 (03-26-25 @ 04:00), Max: 37.2 (03-25-25 @ 21:30)  HR: 112 (03-26-25 @ 11:41) (97 - 120)  BP: 118/75 (03-26-25 @ 04:00) (103/77 - 126/76)  RR: 25 (03-26-25 @ 04:00) (20 - 25)  SpO2: 95% (03-26-25 @ 11:41) (95% - 100%)    Wt(kg): --202.1 (3-23-25)     03-25-25 @ 07:01  -  03-26-25 @ 07:00  --------------------------------------------------------  IN: 100 mL / OUT: 5300 mL / NET: -5200 mL    Constitutional: NAD/Alert, AOX3. Patient assessed with primary RN  Morbid Obese   (+) low airloss support surface (Compella bed), (+) fluidized positioning devices, heels elevated with large Z fluidized positioning device , bariatric seat cushion in chair   HEENT: NC/AT, non icteric, mucosa moist, throat clear, trachea midline, neck supple  Cardiovascular: tachy   Respiratory: Trach in place  in place, patient receiving supplemental oxygen via nasal cannula, intact posterior ears, medical device offloading (Posey) in place   Gastrointestinal: soft NT/ND. obese, (+) flatus passing   Increase moisture in ABD pannus and bilateral groin, Interdry textile dressings replaced   : penile pouch in place   Neurology: follows commands, functional paraplegic  Musculoskeletal:  limited aROM to lower extremities, able to hold on to rail when turned , no deformities/ contractures  Vascular: Intact dry dressings in place to bilateral feet  Skin:  moist w/ good turgor  Left flank region within intertriginous folds  area of hyperpigmentation and hypopigmentation   Right forearm with area of previous  localized erythema and mild induration/hematoma? erythema resolved, small hematoma palpable?   Sacrum/gluteal cleft and bilateral buttocks with full thickness wound exposing mixed tissue type, gluteal cleft wound partially obscured in patients natural anatomical position. Difficult to stretch gluteal cleft open given patients body habitus   -Measurements taking separately given patient habitus difficult to measure as one   -Left buttock cluster measuring- 3cmx2.5cmx0.2cm (prev 5jvt1zao5.2cm),  from main wound by re-epithelization bridge, tissue type with pink granular tissue/red dermis and fibrinous tissue   -Gluteal cleft to right buttock- [id number] (prev 47mlj4edg7.2cm), large partial thickness wound in right buttock included in measurements   -Gluteal cleft exposing fibrinous yellow/slough/fibrinous tissue and gray moist tan fixed eschar, right buttock with pink moist dermis and yellow fibrinous tissue and areas of re-epithelization. Wound friable, distal gluteal cleft with some oozing of sanguinous drainage, manual pressure held for less than 5 minutes with dry gauze. Bleeding subsided. Patient turned to right side and no longer observed bleeding. Hemostatic dressing placed over gluteal cleft area and TRIAD applied to buttocks     ACP Bharti S. called to bedside to assess bleeding from wound, discussed plan at the bedside   Psych: calm and cooperative      LABS/ CULTURES/ RADIOLOGY:              7.7    8.29  >-----------<  489      [03-26-25 @ 05:25]              23.1     139  |  91  |  5   ----------------------------<  95      [03-26-25 @ 05:25]  3.9   |  38  |  0.79        Ca     9.0     [03-26-25 @ 05:25]      Mg     1.70     [03-26-25 @ 05:25]      Phos  5.5     [03-26-25 @ 05:25]      Blood Gas Calcium, Ionized - Venous: 1.23 mmol/L (03-26-25 @ 06:25)  Blood Gas Calcium, Ionized - Venous: 1.23 mmol/L (03-25-25 @ 05:11)      CAPILLARY BLOOD GLUCOSE      POCT Blood Glucose.: 123 mg/dL (26 Mar 2025 11:06)  POCT Blood Glucose.: 103 mg/dL (26 Mar 2025 07:24)  POCT Blood Glucose.: 96 mg/dL (25 Mar 2025 21:19)  POCT Blood Glucose.: 108 mg/dL (25 Mar 2025 17:28)    Triglycerides, Serum: 169 mg/dL (03-08-25 @ 00:18)  Triglycerides, Serum: 187 mg/dL (03-07-25 @ 06:00)  Triglycerides, Serum: 120 mg/dL (03-06-25 @ 04:00)  Triglycerides, Serum: 155 mg/dL (03-05-25 @ 03:30)  Triglycerides, Serum: 147 mg/dL (03-04-25 @ 03:45)    Culture - Blood (collected 03-20-25 @ 17:00)  Source: Blood Blood-Peripheral  Final Report (03-25-25 @ 22:00):    No growth at 5 days    Culture - Blood (collected 03-20-25 @ 15:50)  Source: Blood Blood-Venous  Final Report (03-25-25 @ 22:00):    No growth at 5 days    A1C with Estimated Average Glucose Result: 6.7 % (02-25-25 @ 05:44)

## 2025-03-26 NOTE — PROGRESS NOTE ADULT - ASSESSMENT
This is a 40 y/o M w/ PMHx AF (not on AC), HTN, BEA, pseudotumor cerebri s/p LP in 2024, initially presented to [place] on 2/24, SOB, LE edema with URI symptoms and sick contacts, noted to have severe ARDS, intubated however still hypoxia, cannulated for VV ECMO on 2/25, transferred to [facility] on 2/25, started on empiric Vanco, Zosyn for multifocal PNA, abdominal wall cellulitis, s/p trach placement by CT surgery on 3/7, ECMO decannulated on 3/7. Zosyn held on 3/9.  C/b fevers on 3/10, restarted on Zosyn, transferred to [facility] on 3/13, Bcx now w/ yeast.    #Candida albicans fungemia   #Fevers   #Multifocal PNA, abdominal wall cellulitis s/p Vanco/Zosyn  #ARDS, hypoxic respiratory failure requiring VV EMCO 2/2 multifocal PNA? s/p decannulation on 3/7    Overall, 40 y/o M w/ PMHx AF (not on AC), HTN, BEA, pseudotumor cerebri s/p LP in 2024 admitted to [facility] on 2/26 for ARDS, hypoxic respiratory failure requiring VV EMCO 2/2 multifocal PNA? s/p decannulation on 3/7, c/b abdominal wall cellulitis s/p Vancomycin/Zosyn, s/p trach on 3/7, in the setting of persistent fevers despite Zosyn, BCx now w/ yeast, Candida albicans   Unclear source for yeast in BCx, pt had all central lines removed on 3/7, not getting TPN, no abdominal pain on exam, PEG site well appearing.     CT A/P w/o clear abdominal source of fungemia, possibly 2/2 abdominal wall cellulitis? TTE w/o IE.  BCx 3/18 1/2 positive, 3/20 NGTD x 2.     Recommendations;   1. Caspofungin 70 mg daily (given weight) (would avoid fluconazole for now given high dose needed). Would plan for 2 week course after negative BCx  (if 3/20 remains NG, then 4/2/25)  2. Repeat BCx on 3/20 NG    Thank you for consulting us and involving us in the management of this patient's case. In addition to reviewing history, imaging, documents, labs, microbiology, and infection control strategies and potential issues.     ID will continue to follow    Shailesh Owens M.D.  Attending Physician  Division of Infectious Diseases  Department of Medicine    Please contact through MS Teams message.  Office: [phone] (after 5 PM or weekend)

## 2025-03-26 NOTE — PROGRESS NOTE ADULT - NS ATTEND AMEND GEN_ALL_CORE FT
Pt is a 39M with MHx obesity (Class III, BMI 69), pAfib (no AC), HTN, BEA (dz'ed 2019, AHI 34 non-compliant on CPAP set at 10), and history of b/l papilledema attributed to pseudotumor cerebri initially presenting as a transfer [facility] on 2/26 for acute hypoxemic respiratory failure s/p ETT intubation/MV with progression to refractory ARDS s/p initiation of vv-ECMO support (2/26-3/7) with failure to wean from ventilator s/p tracheostomy placement, further found to have RV failure s/p aggressive diuresis and PNA followed by severe sepsis 2/2 panniculitis c/b candida albicans fungemia now transferred to [facility] for further medical management.     Pt now awake and alert, spontaneously communicative and at mental status baseline. Continues to have severe functional debility likely 2/2 prolonged critical illness polyneuropathy but is progressing well. Now able to transfer via [place] to chair daily and is independent of UE and fine motor function. Off all sedation. Weaning of catapres initiated 2/25 (decreased to 0.2mg TID.) Weaning pain control, pt remains dependent on Dilaudid prn for LE neuropathic pain. PO regimen added and gabapentin increased without benefit. Will consult pain mgmt for further assistance.    Pt with acute combined hypoxemic and hypercapnic respiratory failure with failure to wean from ventilator s/p tracheostomy placement. Now tolerating TC QD, PSV (18/10) qhs. Now on TC trial, using oral interface for qhs Bilevel support. Airway clearance therapy in place. Wean O2 supplementation for goal O2 saturation 90-95%. Aspiration precautions and oral hygiene in place. Trach care and suctioning as per RCU routine. Pt phonating well, no issues with mucous expectoration or swallow impairment.     Pt further presenting with acute on chronic hypercapnia and will require Bilevel after discharge for chronic use 2/2 chronic hypercapnic respiratory failure from hypoventilation and obesity hypoventilation syndrome (BMI 69.) Pt has exhibited an inability to maintain appropriate ventilation with persistent hypercapnia without Bilevel support qhs. Pt has high CO2 levels (PaCO2 on ABG >52.) PaCO2 done immediately upon awakening while breathing his prescribed FiO2 shows the patient's PaCO2 worsened >7mmHg compared to initial ABG. Patient's OHS requires ventilatory support. Due to the patient's severe disease state and severe dyspnea, Bilevel is necessary. Due to the patient's decreased ventilatory drive and high CO2 levels, without Bilevel support patient will experience rapid clinical deterioration, which will lead to serious medical harm. Acute on chronic hypercapnic respiratory failure due to patient (BMI 69) obesity, which is causing restriction of the thoracic cage not allowing for proper gas exchange.     Pt initially p/w RV failure 2/2 pulmHTN now s/p aggressive diuresis with improvement. Now transitioned to PO diuretics with goal to maintain euvolemia. Acetazolamide added today. AFib well controlled conservatively, will continue to monitor on telemetry. TTE 2/25 with new RVE, 3/11 with RVSD. DVT noted on repeat duplex of the right IJ and bilateral peroneal veins. Will c/w full A/C on Eliquis for DVT and AFib.     Pt with oropharyngeal dysphagia s/p PEG placement (3/7) now transitioned to PO diet (3/20) via FEEST. Will continue to monitor on soft diet. Bowel regimen in place prn. PPI ppx. Transaminitis improving.     Pt further found to have severe sepsis 2/ candida albicans fungemia possibly from panniculitis (3/15, cleared 3/18) now on caspofungin with plan to complete 14 day course from clearance. TTE (-) endocarditis. Wound care team following. ID recs appreciated.     Dispo pending medical optimization. Pt full code. DVT ppx in place. Palliative consulted, recs appreciated. Will continue to update family and discuss plan of care throughout hospitalization.

## 2025-03-27 ENCOUNTER — APPOINTMENT (OUTPATIENT)
Dept: FAMILY MEDICINE | Facility: CLINIC | Age: 40
End: 2025-03-27
Payer: COMMERCIAL

## 2025-03-27 DIAGNOSIS — Z93.0 TRACHEOSTOMY STATUS: ICD-10-CM

## 2025-03-27 DIAGNOSIS — B49 UNSPECIFIED MYCOSIS: ICD-10-CM

## 2025-03-27 DIAGNOSIS — J18.9 PNEUMONIA, UNSPECIFIED ORGANISM: ICD-10-CM

## 2025-03-27 DIAGNOSIS — A48.0 GAS GANGRENE: ICD-10-CM

## 2025-03-27 DIAGNOSIS — Z92.81 PERSONAL HISTORY OF EXTRACORPOREAL MEMBRANE OXYGENATION (ECMO): ICD-10-CM

## 2025-03-27 DIAGNOSIS — J80 ACUTE RESPIRATORY DISTRESS SYNDROME: ICD-10-CM

## 2025-03-27 LAB
ANION GAP SERPL CALC-SCNC: 11 MMOL/L — SIGNIFICANT CHANGE UP (ref 7–14)
BASE EXCESS BLDV CALC-SCNC: 12.1 MMOL/L — HIGH (ref -2–3)
BUN SERPL-MCNC: 5 MG/DL — LOW (ref 7–23)
CALCIUM SERPL-MCNC: 9.1 MG/DL — SIGNIFICANT CHANGE UP (ref 8.4–10.5)
CHLORIDE SERPL-SCNC: 95 MMOL/L — LOW (ref 98–107)
CO2 BLDV-SCNC: 37 MMOL/L — HIGH (ref 22–26)
CO2 SERPL-SCNC: 33 MMOL/L — HIGH (ref 22–31)
CREAT SERPL-MCNC: 0.86 MG/DL — SIGNIFICANT CHANGE UP (ref 0.5–1.3)
EGFR: 114 ML/MIN/1.73M2 — SIGNIFICANT CHANGE UP
EGFR: 114 ML/MIN/1.73M2 — SIGNIFICANT CHANGE UP
GLUCOSE BLDC GLUCOMTR-MCNC: 103 MG/DL — HIGH (ref 70–99)
GLUCOSE BLDC GLUCOMTR-MCNC: 107 MG/DL — HIGH (ref 70–99)
GLUCOSE BLDC GLUCOMTR-MCNC: 118 MG/DL — HIGH (ref 70–99)
GLUCOSE BLDC GLUCOMTR-MCNC: 128 MG/DL — HIGH (ref 70–99)
GLUCOSE SERPL-MCNC: 101 MG/DL — HIGH (ref 70–99)
HCO3 BLDV-SCNC: 36 MMOL/L — HIGH (ref 22–29)
HCT VFR BLD CALC: 26.1 % — LOW (ref 39–50)
HGB BLD-MCNC: 8.3 G/DL — LOW (ref 13–17)
MAGNESIUM SERPL-MCNC: 1.7 MG/DL — SIGNIFICANT CHANGE UP (ref 1.6–2.6)
MCHC RBC-ENTMCNC: 28.8 PG — SIGNIFICANT CHANGE UP (ref 27–34)
MCHC RBC-ENTMCNC: 31.8 G/DL — LOW (ref 32–36)
MCV RBC AUTO: 90.6 FL — SIGNIFICANT CHANGE UP (ref 80–100)
NRBC # BLD AUTO: 0 K/UL — SIGNIFICANT CHANGE UP (ref 0–0)
NRBC # FLD: 0 K/UL — SIGNIFICANT CHANGE UP (ref 0–0)
NRBC BLD AUTO-RTO: 0 /100 WBCS — SIGNIFICANT CHANGE UP (ref 0–0)
PCO2 BLDV: 42 MMHG — SIGNIFICANT CHANGE UP (ref 42–55)
PH BLDV: 7.54 — HIGH (ref 7.32–7.43)
PHOSPHATE SERPL-MCNC: 5.7 MG/DL — HIGH (ref 2.5–4.5)
PLATELET # BLD AUTO: 463 K/UL — HIGH (ref 150–400)
PO2 BLDV: 193 MMHG — HIGH (ref 25–45)
POTASSIUM SERPL-MCNC: 3.5 MMOL/L — SIGNIFICANT CHANGE UP (ref 3.5–5.3)
POTASSIUM SERPL-SCNC: 3.5 MMOL/L — SIGNIFICANT CHANGE UP (ref 3.5–5.3)
RBC # BLD: 2.88 M/UL — LOW (ref 4.2–5.8)
RBC # FLD: 25.1 % — HIGH (ref 10.3–14.5)
SAO2 % BLDV: 99.4 % — HIGH (ref 67–88)
SODIUM SERPL-SCNC: 139 MMOL/L — SIGNIFICANT CHANGE UP (ref 135–145)
WBC # BLD: 9.33 K/UL — SIGNIFICANT CHANGE UP (ref 3.8–10.5)
WBC # FLD AUTO: 9.33 K/UL — SIGNIFICANT CHANGE UP (ref 3.8–10.5)

## 2025-03-27 PROCEDURE — 99214 OFFICE O/P EST MOD 30 MIN: CPT | Mod: 95

## 2025-03-27 PROCEDURE — 99233 SBSQ HOSP IP/OBS HIGH 50: CPT

## 2025-03-27 PROCEDURE — G0545: CPT

## 2025-03-27 PROCEDURE — 99232 SBSQ HOSP IP/OBS MODERATE 35: CPT

## 2025-03-27 PROCEDURE — G2211 COMPLEX E/M VISIT ADD ON: CPT | Mod: 95

## 2025-03-27 RX ORDER — OXYCODONE HYDROCHLORIDE 30 MG/1
7.5 TABLET ORAL EVERY 4 HOURS
Refills: 0 | Status: DISCONTINUED | OUTPATIENT
Start: 2025-03-27 | End: 2025-03-28

## 2025-03-27 RX ORDER — OXYCODONE HYDROCHLORIDE 30 MG/1
7.5 TABLET ORAL EVERY 4 HOURS
Refills: 0 | Status: DISCONTINUED | OUTPATIENT
Start: 2025-03-27 | End: 2025-03-27

## 2025-03-27 RX ADMIN — AMLODIPINE BESYLATE 10 MILLIGRAM(S): 10 TABLET ORAL at 06:07

## 2025-03-27 RX ADMIN — POLYETHYLENE GLYCOL 3350 17 GRAM(S): 17 POWDER, FOR SOLUTION ORAL at 11:39

## 2025-03-27 RX ADMIN — FUROSEMIDE 40 MILLIGRAM(S): 10 INJECTION INTRAMUSCULAR; INTRAVENOUS at 06:07

## 2025-03-27 RX ADMIN — Medication 0.2 MILLIGRAM(S): at 21:14

## 2025-03-27 RX ADMIN — Medication 15 MILLILITER(S): at 17:32

## 2025-03-27 RX ADMIN — Medication 2 TABLET(S): at 21:14

## 2025-03-27 RX ADMIN — Medication 1 APPLICATION(S): at 06:09

## 2025-03-27 RX ADMIN — APIXABAN 5 MILLIGRAM(S): 2.5 TABLET, FILM COATED ORAL at 17:32

## 2025-03-27 RX ADMIN — Medication 0.5 MILLIGRAM(S): at 18:02

## 2025-03-27 RX ADMIN — GABAPENTIN 400 MILLIGRAM(S): 400 CAPSULE ORAL at 21:14

## 2025-03-27 RX ADMIN — LIDOCAINE HYDROCHLORIDE 1 PATCH: 20 JELLY TOPICAL at 06:28

## 2025-03-27 RX ADMIN — OXYCODONE HYDROCHLORIDE 5 MILLIGRAM(S): 30 TABLET ORAL at 01:08

## 2025-03-27 RX ADMIN — Medication 3 MILLIGRAM(S): at 21:14

## 2025-03-27 RX ADMIN — Medication 650 MILLIGRAM(S): at 23:51

## 2025-03-27 RX ADMIN — IPRATROPIUM BROMIDE AND ALBUTEROL SULFATE 3 MILLILITER(S): .5; 2.5 SOLUTION RESPIRATORY (INHALATION) at 03:33

## 2025-03-27 RX ADMIN — LIDOCAINE HYDROCHLORIDE 1 PATCH: 20 JELLY TOPICAL at 19:00

## 2025-03-27 RX ADMIN — GABAPENTIN 400 MILLIGRAM(S): 400 CAPSULE ORAL at 06:07

## 2025-03-27 RX ADMIN — QUETIAPINE FUMARATE 25 MILLIGRAM(S): 25 TABLET ORAL at 21:16

## 2025-03-27 RX ADMIN — Medication 0.5 MILLIGRAM(S): at 06:12

## 2025-03-27 RX ADMIN — OXYCODONE HYDROCHLORIDE 7.5 MILLIGRAM(S): 30 TABLET ORAL at 16:45

## 2025-03-27 RX ADMIN — Medication 0.2 MILLIGRAM(S): at 06:07

## 2025-03-27 RX ADMIN — IPRATROPIUM BROMIDE AND ALBUTEROL SULFATE 3 MILLILITER(S): .5; 2.5 SOLUTION RESPIRATORY (INHALATION) at 21:11

## 2025-03-27 RX ADMIN — CLOTRIMAZOLE 1 APPLICATION(S): 1 CREAM TOPICAL at 17:33

## 2025-03-27 RX ADMIN — CLOTRIMAZOLE 1 APPLICATION(S): 1 CREAM TOPICAL at 06:09

## 2025-03-27 RX ADMIN — Medication 0.5 MILLIGRAM(S): at 18:43

## 2025-03-27 RX ADMIN — Medication 40 MILLIGRAM(S): at 08:06

## 2025-03-27 RX ADMIN — IPRATROPIUM BROMIDE AND ALBUTEROL SULFATE 3 MILLILITER(S): .5; 2.5 SOLUTION RESPIRATORY (INHALATION) at 15:27

## 2025-03-27 RX ADMIN — Medication 650 MILLIGRAM(S): at 06:10

## 2025-03-27 RX ADMIN — Medication 650 MILLIGRAM(S): at 17:32

## 2025-03-27 RX ADMIN — Medication 0.2 MILLIGRAM(S): at 13:15

## 2025-03-27 RX ADMIN — Medication 15 MILLILITER(S): at 06:09

## 2025-03-27 RX ADMIN — IPRATROPIUM BROMIDE AND ALBUTEROL SULFATE 3 MILLILITER(S): .5; 2.5 SOLUTION RESPIRATORY (INHALATION) at 08:54

## 2025-03-27 RX ADMIN — APIXABAN 5 MILLIGRAM(S): 2.5 TABLET, FILM COATED ORAL at 06:07

## 2025-03-27 RX ADMIN — CASPOFUNGIN ACETATE 260 MILLIGRAM(S): 5 INJECTION, POWDER, LYOPHILIZED, FOR SOLUTION INTRAVENOUS at 06:08

## 2025-03-27 RX ADMIN — LIDOCAINE HYDROCHLORIDE 1 PATCH: 20 JELLY TOPICAL at 11:37

## 2025-03-27 RX ADMIN — Medication 650 MILLIGRAM(S): at 18:00

## 2025-03-27 RX ADMIN — Medication 650 MILLIGRAM(S): at 11:37

## 2025-03-27 RX ADMIN — FUROSEMIDE 40 MILLIGRAM(S): 10 INJECTION INTRAMUSCULAR; INTRAVENOUS at 13:16

## 2025-03-27 RX ADMIN — LIDOCAINE HYDROCHLORIDE 1 PATCH: 20 JELLY TOPICAL at 11:38

## 2025-03-27 RX ADMIN — OXYCODONE HYDROCHLORIDE 7.5 MILLIGRAM(S): 30 TABLET ORAL at 20:52

## 2025-03-27 RX ADMIN — GABAPENTIN 400 MILLIGRAM(S): 400 CAPSULE ORAL at 13:15

## 2025-03-27 RX ADMIN — Medication 650 MILLIGRAM(S): at 12:07

## 2025-03-27 RX ADMIN — Medication 15 MILLILITER(S): at 11:38

## 2025-03-27 NOTE — PROGRESS NOTE ADULT - SUBJECTIVE AND OBJECTIVE BOX
Patient is a 39y old  Male who presents with a chief complaint of sob (02 Mar 2025 06:19)      Interval history: patient states he worked with PT today however was limited due to needing wound care.    REVIEW OF SYSTEMS  weakness    PAST MEDICAL & SURGICAL HISTORY  HTN (hypertension)    Atrial fibrillation    Papilledema of both eyes    Chronic sinusitis    Morbid obesity    No significant past surgical history      CURRENT FUNCTIONAL STATUS  3/27  Bed Mobility  Bed Mobility Training Rehab Potential: fair, will monitor progress closely  Bed Mobility Training Rolling/Turning: moderate assist (50% patient effort);  1 person assist  Bed Mobility Training Scooting: moderate assist (50% patient effort);  1 person assist  Bed Mobility Training Sit-to-Supine: moderate assist (50% patient effort);  1 person assist  Bed Mobility Training Supine-to-Sit: moderate assist (50% patient effort);  1 person assist  Bed Mobility Training Limitations: decreased ability to use arms for pushing/pulling;  impaired ability to control trunk for mobility;  decreased strength    Therapeutic Exercise  Therapeutic Exercise Rehab Effort: fair   long arc quad at edge of bed LLE x 20 repitions.           RECENT LABS/IMAGING  CBC Full  -  ( 27 Mar 2025 06:20 )  WBC Count : 9.33 K/uL  RBC Count : 2.88 M/uL  Hemoglobin : 8.3 g/dL  Hematocrit : 26.1 %  Platelet Count - Automated : 463 K/uL  Mean Cell Volume : 90.6 fL  Mean Cell Hemoglobin : 28.8 pg  Mean Cell Hemoglobin Concentration : 31.8 g/dL  Auto Neutrophil # : x  Auto Lymphocyte # : x  Auto Monocyte # : x  Auto Eosinophil # : x  Auto Basophil # : x  Auto Neutrophil % : x  Auto Lymphocyte % : x  Auto Monocyte % : x  Auto Eosinophil % : x  Auto Basophil % : x    03-27    139  |  95[L]  |  5[L]  ----------------------------<  101[H]  3.5   |  33[H]  |  0.86    Ca    9.1      27 Mar 2025 06:20  Phos  5.7     03-27  Mg     1.70     03-27      Urinalysis Basic - ( 27 Mar 2025 06:20 )    Color: x / Appearance: x / SG: x / pH: x  Gluc: 101 mg/dL / Ketone: x  / Bili: x / Urobili: x   Blood: x / Protein: x / Nitrite: x   Leuk Esterase: x / RBC: x / WBC x   Sq Epi: x / Non Sq Epi: x / Bacteria: x        VITALS  T(C): 37.1 (03-27-25 @ 04:00), Max: 37.3 (03-27-25 @ 00:00)  HR: 112 (03-27-25 @ 11:21) (100 - 120)  BP: 113/72 (03-27-25 @ 04:00) (113/72 - 129/71)  RR: 24 (03-27-25 @ 04:00) (22 - 26)  SpO2: 98% (03-27-25 @ 11:21) (96% - 99%)  Wt(kg): --    ALLERGIES  No Known Allergies      MEDICATIONS   acetaminophen     Tablet .. 650 milliGRAM(s) Oral every 6 hours PRN  acetaminophen     Tablet .. 650 milliGRAM(s) Oral every 6 hours  albuterol/ipratropium for Nebulization 3 milliLiter(s) Nebulizer every 6 hours  amLODIPine   Tablet 10 milliGRAM(s) Oral daily  apixaban 5 milliGRAM(s) Oral every 12 hours  caspofungin IVPB 70 milliGRAM(s) IV Intermittent every 24 hours  chlorhexidine 0.12% Liquid 15 milliLiter(s) Swish and Spit two times a day  chlorhexidine 2% Cloths 1 Application(s) Topical <User Schedule>  cloNIDine 0.2 milliGRAM(s) Oral every 8 hours  clotrimazole 1% Cream 1 Application(s) Topical two times a day  dextrose 50% Injectable 25 Gram(s) IV Push once  dextrose 50% Injectable 12.5 Gram(s) IV Push once  dextrose 50% Injectable 25 Gram(s) IV Push once  furosemide    Tablet 40 milliGRAM(s) Oral two times a day  gabapentin 400 milliGRAM(s) Oral every 8 hours  glucagon  Injectable 1 milliGRAM(s) IntraMuscular once  HYDROmorphone  Injectable 0.5 milliGRAM(s) IV Push every 6 hours PRN  insulin lispro (ADMELOG) corrective regimen sliding scale   SubCutaneous three times a day before meals  lidocaine   4% Patch 1 Patch Transdermal daily  lidocaine   4% Patch 1 Patch Transdermal daily  melatonin 3 milliGRAM(s) Oral at bedtime  multivitamin/minerals/iron Oral Solution (CENTRUM) 15 milliLiter(s) Oral daily  oxyCODONE    IR 5 milliGRAM(s) Oral every 4 hours PRN  pantoprazole    Tablet 40 milliGRAM(s) Oral before breakfast  polyethylene glycol 3350 17 Gram(s) Oral daily  QUEtiapine 25 milliGRAM(s) Oral at bedtime  senna 2 Tablet(s) Oral at bedtime      ----------------------------------------------------------------------------------------   PHYSICAL EXAM  Constitutional - NAD   HEENT - +  on trach  Chest - no respiratory distress  Cardiovascular -mild tachy  Abdomen - +PEG  Extremities - dressings b/l feet  Neurologic Exam -                    Cognitive - Awake, Alert, AAO to self, place, date, year, situation      Communication - fluent     Cranial Nerves - CN 2-12 intact     Motor -                     LEFT    UE - 4+/5                    RIGHT UE - 4+/5                    LEFT    LE -  2+/5 however dorsiflexion 0/5                    RIGHT LE -  2-/4     Sensory - Intact to LT       Balance - WNL Static  Psychiatric - Mood stable, Affect WNL  ----------------------------------------------------------------------------------------  ASSESSMENT/PLAN  40 yo m PMHx of morbid obesity, BEA on CPAP, pAFIB (not on AC), HTN, and BL papilledema attributed to pseudotumor cerebri who presented initially to [facility] on 2/24 with SOB and BL LE edema. Found with URI outpatient with progressive SOB, and concern for noted for MFPNA on imaging. Course progressed with AHRF with worsening O2 demand, respiratory distress, secretions, and somnolence requiring intubation (difficult with success after 6 attempts) in ED and admission to [facility] MICU. While in MICU, patient noted with increasing O2 demand with no improvement despite optimal vent management. Concern noted for progressive ARDS, unable to prone given morbid obesity, and ultimately cannulated for vvECMO on 2/25 and transferred to [facility] for further management on 2/26.   transferred to RCU 3/11, course complicated by fever. afebrile past 48 hours  s/p trach and peg 3/7  cleared for soft bite size diet with thin liquids (must wear one way speaking valve with all meals)  continue bedside PT and OT    podiatry following for vasopressor associated wounds, receiving local wound care. wbat with offloading shoe  Pain - acetaminphen, gabapentin 300mg q8    DVT PPX - on eliquis  Rehab -  tolerated bedside therapy, goal is for acute rehab when medically cleared.  Patient can tolerate 3 hours per day of therapy with medical supervision      Total time spent to review relevant records and imaging results, examine patient, complete documentation, and when applicable discuss the case with the patient, family, , social workers, and medical team: 35  minutes

## 2025-03-27 NOTE — PROGRESS NOTE ADULT - ASSESSMENT
38 YO M with PMHx of morbid obesity, BEA on CPAP, pAFIB (not on AC), HTN, and BL papilledema attributed to pseudotumor cerebri who presented initially to [facility] on 2/24 with SOB and BL LE edema. Found with URI outpatient with progressive SOB, and concern for noted for MFPNA on imaging. Course progressed with AHRF with worsening O2 demand, respiratory distress, secretions, and somnolence requiring intubation (difficult with success after 6 attempts) in ED and admission to [facility] MICU. While in MICU, patient noted with increasing O2 demand with no improvement despite optimal vent management. Concern noted for progressive ARDS, unable to prone given morbid obesity, and ultimately cannulated for vvECMO on 2/25 and transferred to [facility] for further management on 2/26. While at [facility], tx with diuresis and ABX, and ultimately decannulated and s/p 60XLTCP trach and PEG on 3/7. Patient ultimately transferred to [facility] on 3/11 and course complicated by recurrent high grade fevers and found with fungemia.    NEUROLOGY  # Hx of Pseudotumor Cerebri   - s/p LP in 2024 with high opening pressure  - s/p MRI 2024 with possible pituitary mass but unclear because of pressure effect from pseudotumor cerebri causing partially empty sella.  - Prolactin elevated   - ACTH low but recieved steroids during admission   - FT4 low normal and TSH normal, but given FT4 low normal TSH should be higher   - Discuss endocrine consult for inhouse work up vs outpatient.     # Sedation   - Weaned off sedation in ICU   - Nimbex turned off 2/27   - Prop turned off 3/9   - Precedex turned off and catapres started 3/11  - Continue on catapres 0.2 TID (decreased 3/25) - taper    # Insomnia   - Patient worked night shift x 15 years   - Trouble sleeping at night leading to day time sleepiness and poor participation with PT  - Continue on Seroquel 25 QHS (increased 3/18)   - Continue on melatonin   - Continue on neurontin as below    # BL LE neuropathy   - CIPN concern   - Neurontin increased to 300mg Q8H with relief   - added Percocet Q4H for moderate pain (3/25) and continue on Dilaudid Q6H for severe pain   - Pain mgt called to consult - with recs for tylenol ATC, NEurontin increased to 400mg q6, Oxycodone 5 q4h prn , Dilaudid for BREAKTHROUGH pain only, Lidocaine patches on each leg, ice prn to feet    CARDIOVASCULAR  # HTN   - HTN noted in ICU requiring cardene and esmolol GTTs   - Lisinopril dc'ed to increase catapres   - Continue on norvasc 10 and catapres 0.3 TID   - Monitor BP     # AFIB   - Hx of pAFIB   - No RVR episodes or pAFIB episodes in ICU   - No rate control medications needed  - Monitor on telemetry     # RV dilation second to PHTN   - POCUS on arrival to [facility] with RVE concern   - TTE 10/2024 with EF 55-60 with normal LVSF, moderate LVH and normal RVSF and size with no concern for pHTN   - TTE on 2/25 at  with EF 61 and normal LVSF, but enlarged RV size with reduced RVSF, mild TR, mild PHTN with PASP 49, and dilated IVC to 3.4cn, but normal TAPSE 2.1.  - Continue on aggressive diuresis in ICU    - TTE 3/11 with RVSF reduced with TAPSE 1.6. IVC improved to 2.12cm.   - Continue on lasix 40 PO BID   - Monitor IO    RESPIRATORY  # ARDS second to MFPNA   - Hx of BEA on CPAP presented to  post URI with SOB and found with AHRF with progression to ARDS second to post viral MFPNA   - Intubated 2/25   - Cannulated for vvECMO 2/26   - s/p 60XLTCP tracheostomy 3/7   - Decannulated 3/7   - CTSx removed tracheostomy and ECMO sutures on 3/17   - Continue on TC QD with PMV as able with strong phonation  - Continue on PS 18/10/40 QHS given OHS   - Continue on nebs and HTS   -  trials continue and now trialing overnight with BIPAP for OHS (10/8/12)  - trend VBG QD    GI  # Dysphagia   - s/p PEG 3/7   - Attempted green dye on 3/13 and failed  - Continue on TF  - Continue on miralax and senna  - RPT green dye passed 3/19  - 3/20 Passed FEEST - on soft bite sized with thin liquids     # Mild transaminitis   - LFTs elevated in ICU with TBILI elevated likely from ECMO hemolysis   - US ABD with hepatic steatosis   - Trend LFTs      RENAL  # ELLA   - ELLA likely from cardiorenal with RVE   - Diuresed in ICU and improved   - Monitor renal function and UOP     # Hypernatremia   - Hypernatremia with fever spikes   - Fever curve improved and attempting to wean FWF   - Continue on  Q6H (decreased 3/19)    INFECTIOUS DISEASE  # Recurrent fevers with fungemia from unknown source   - Recurrent fevers post ECMO decannulation thought initially to be cytokine surge   - BCx, SCx, UCx, RVP and MRSA RPT on 3/12 negative   - Completed zosyn (3/12-3/18) empirically with improved WBC , however recurrent fevers noted.   - RPT SCx, UA, CXR and RVP negative  - TTE 3/17 with no IE  - PanCT 3/17 with PNA and persistent panniculitis   - BCx 3/15 and 3/16 with candida albicans.   - Concern for possible source from panniculitis   - Continue on caspo (3/17 - ) and RPT BCx 3/18 negative  - ID following  - plan for 2 week course after negative BCx to 4/2    # MFPNA and abdominal pannus cellulitis   - Presented to  post URI with SOB and found with AHRF with progression to ARDS second to post viral MFPNA   - RVP, legionella, strept, BAL, BCx, UCx, HIV, PCP, and adenovirus PCR negative   - Initially started on multiple agents in ICU and ultimatelty completed zosyn (2/26-3/9) ZMAX (2/25-2/26) and vanco (2/26-3/1)     # Penile discharge   - GC/ chlamydia negative   - HIV negative   - Monitor for now    # PPX   - MRSA PCR positive and completed bactroban (2/26-3/3)     HEME   # Anemia likely second to ECMO circuit vs AOCD   - HH low in ICU second to ECMO circuit   - HH dropping again today however no bleeding noted   - Microcytic and hemochromic, sent anemia panel 3/19  - Trend      VASCULAR   # R IJ and BL LE DVTs   - Post ECMO dopplers on 3/9 negative for BL UE/ LE DVTs.   - Subsequent post ECMO dopplers performed on 3/14 and noted with acute, non-occlusive deep vein thrombosis noted within the right internal jugular vein. acute, occlusive deep vein thromboses noted within the right and left peroneal veins at both mid calves, and age-indeterminate, occlusive deep vein thrombosis visualized within the left gastrocnemius vein at the proximal calf.     - Continue on argatroban GTT and change to eliquis     PODIATRY   # BL plantar feet blisters likely pressors induced  - Seen by podiatry and blisters lanced on 3/4 and again on 3/18  - Continue on local wound care per podiatry   - Podiatry following  - OK for WBAT will fu with PT for offloading shoe if appropriate    ENDOCRINE  # Pre-DM2   - A1C 6.7  - Continue on ISS   - Monitor FS   - IF no demand then stop ISS     LINES/ TUBES  - R IJ and R FEM ECMO (2/26-3/7)     SKIN  # Pannus canndial intertrigo   # Sacrum/ buttock MAD  - Continue on clotrimazole cream   - WOC appreciated   -Seen by WC pt noted to have oozing from Buttock wound surgicel placed by wound care On follow up no further bleeding  - Wound care placed orders     ETHICS/ GOC    - FULL CODE     DISPO - PT and PMR recc FLORENCE, however will continue to reassess for acute Seen by PMR and goal for acute rehab     38 YO M with PMHx of morbid obesity, BEA on CPAP, pAFIB (not on AC), HTN, and BL papilledema attributed to pseudotumor cerebri who presented initially to [facility] on 2/24 with SOB and BL LE edema. Found with URI outpatient with progressive SOB, and concern for noted for MFPNA on imaging. Course progressed with AHRF with worsening O2 demand, respiratory distress, secretions, and somnolence requiring intubation (difficult with success after 6 attempts) in ED and admission to [facility] MICU. While in MICU, patient noted with increasing O2 demand with no improvement despite optimal vent management. Concern noted for progressive ARDS, unable to prone given morbid obesity, and ultimately cannulated for vvECMO on 2/25 and transferred to [facility] for further management on 2/26. While at [facility], tx with diuresis and ABX, and ultimately decannulated and s/p 60XLTCP trach and PEG on 3/7. Patient ultimately transferred to [facility] on 3/11 and course complicated by recurrent high grade fevers and found with fungemia.    NEUROLOGY  # Hx of Pseudotumor Cerebri   - s/p LP in 2024 with high opening pressure  - s/p MRI 2024 with possible pituitary mass but unclear because of pressure effect from pseudotumor cerebri causing partially empty sella.  - Prolactin elevated   - ACTH low but recieved steroids during admission   - FT4 low normal and TSH normal, but given FT4 low normal TSH should be higher   - Discuss endocrine consult for inhouse work up vs outpatient.     # Sedation   - Weaned off sedation in ICU   - Nimbex turned off 2/27   - Prop turned off 3/9   - Precedex turned off and catapres started 3/11  - Continue on catapres 0.2 TID (decreased 3/25) - taper    # Insomnia   - Patient worked night shift x 15 years   - Trouble sleeping at night leading to day time sleepiness and poor participation with PT  - Continue on Seroquel 25 QHS (increased 3/18)   - Continue on melatonin   - Continue on neurontin as below    # BL LE neuropathy   - CIPN concern   - Neurontin increased to 300mg Q8H with relief   - added Percocet Q4H for moderate pain (3/25) and continue on Dilaudid Q6H for severe pain   - Pain mgt called to consult - with recs for tylenol ATC, NEurontin increased to 400mg q6, Oxycodone 5 q4h prn , Dilaudid for BREAKTHROUGH pain only, Lidocaine patches on each leg, ice prn to feet  - Some improvement in pain - PT consult for TENS,     CARDIOVASCULAR  # HTN   - HTN noted in ICU requiring cardene and esmolol GTTs   - Lisinopril dc'ed to increase catapres   - Continue on norvasc 10 and catapres 0.3 TID   - Monitor BP     # AFIB   - Hx of pAFIB   - No RVR episodes or pAFIB episodes in ICU   - No rate control medications needed  - Monitor on telemetry     # RV dilation second to PHTN   - POCUS on arrival to [facility] with RVE concern   - TTE 10/2024 with EF 55-60 with normal LVSF, moderate LVH and normal RVSF and size with no concern for pHTN   - TTE on 2/25 at  with EF 61 and normal LVSF, but enlarged RV size with reduced RVSF, mild TR, mild PHTN with PASP 49, and dilated IVC to 3.4cn, but normal TAPSE 2.1.  - Continue on aggressive diuresis in ICU    - TTE 3/11 with RVSF reduced with TAPSE 1.6. IVC improved to 2.12cm.   - Continue on lasix 40 PO BID   - Monitor IO    RESPIRATORY  # ARDS second to MFPNA   - Hx of BEA on CPAP presented to  post URI with SOB and found with AHRF with progression to ARDS second to post viral MFPNA   - Intubated 2/25   - Cannulated for vvECMO 2/26   - s/p 60XLTCP tracheostomy 3/7   - Decannulated 3/7   - CTSx removed tracheostomy and ECMO sutures on 3/17   - Continue on TC QD with PMV as able with strong phonation  - Continue on PS 18/10/40 QHS given OHS   - Continue on nebs and HTS   -  trials continue and now trialing overnight with BIPAP for OHS (10/8/12)  - trend VBG QD    GI  # Dysphagia   - s/p PEG 3/7   - Attempted green dye on 3/13 and failed  - Continue on TF  - Continue on miralax and senna  - RPT green dye passed 3/19  - 3/20 Passed FEEST - on soft bite sized with thin liquids     # Mild transaminitis   - LFTs elevated in ICU with TBILI elevated likely from ECMO hemolysis   - US ABD with hepatic steatosis   - Trend LFTs      RENAL  # ELLA   - ELLA likely from cardiorenal with RVE   - Diuresed in ICU and improved   - Monitor renal function and UOP     # Hypernatremia   - Hypernatremia with fever spikes   - Fever curve improved and attempting to wean FWF   - Continue on  Q6H (decreased 3/19)    INFECTIOUS DISEASE  # Recurrent fevers with fungemia from unknown source   - Recurrent fevers post ECMO decannulation thought initially to be cytokine surge   - BCx, SCx, UCx, RVP and MRSA RPT on 3/12 negative   - Completed zosyn (3/12-3/18) empirically with improved WBC , however recurrent fevers noted.   - RPT SCx, UA, CXR and RVP negative  - TTE 3/17 with no IE  - PanCT 3/17 with PNA and persistent panniculitis   - BCx 3/15 and 3/16 with candida albicans.   - Concern for possible source from panniculitis   - Continue on caspo (3/17 - ) and RPT BCx 3/18 negative  - ID following  - plan for 2 week course after negative BCx to 4/2  - 3/20 Bcx- NGTD    # MFPNA and abdominal pannus cellulitis   - Presented to  post URI with SOB and found with AHRF with progression to ARDS second to post viral MFPNA   - RVP, legionella, strept, BAL, BCx, UCx, HIV, PCP, and adenovirus PCR negative   - Initially started on multiple agents in ICU and ultimatelty completed zosyn (2/26-3/9) ZMAX (2/25-2/26) and vanco (2/26-3/1)     # Penile discharge   - GC/ chlamydia negative   - HIV negative   - Monitor for now    # PPX   - MRSA PCR positive and completed bactroban (2/26-3/3)     HEME   # Anemia likely second to ECMO circuit vs AOCD   - HH low in ICU second to ECMO circuit   - HH dropping again today however no bleeding noted   - Microcytic and hemochromic, sent anemia panel 3/19  - Trend      VASCULAR   # R IJ and BL LE DVTs   - Post ECMO dopplers on 3/9 negative for BL UE/ LE DVTs.   - Subsequent post ECMO dopplers performed on 3/14 and noted with acute, non-occlusive deep vein thrombosis noted within the right internal jugular vein. acute, occlusive deep vein thromboses noted within the right and left peroneal veins at both mid calves, and age-indeterminate, occlusive deep vein thrombosis visualized within the left gastrocnemius vein at the proximal calf.     - Continue on argatroban GTT and change to eliquis     PODIATRY   # BL plantar feet blisters likely pressors induced  - Seen by podiatry and blisters lanced on 3/4 and again on 3/18  - Continue on local wound care per podiatry   - Podiatry following  - OK for WBAT will fu with PT for offloading shoe if appropriate  - Pain mgt consult     ENDOCRINE  # Pre-DM2   - A1C 6.7  - Continue on ISS   - Monitor FS   - IF no demand then stop ISS     LINES/ TUBES  - R IJ and R FEM ECMO (2/26-3/7)     SKIN  # Pannus canndial intertrigo   # Sacrum/ buttock MAD  - Continue on clotrimazole cream   - WOC appreciated   -Seen by WC pt noted to have oozing from Buttock wound surgicel placed by wound care On follow up no further bleeding  - Wound care placed orders   - No bleeding     ETHICS/ GOC    - FULL CODE     DISPO - PT and PMR recc FLORENCE, however will continue to reassess for acute Seen by PMR and goal for acute rehab

## 2025-03-27 NOTE — PROGRESS NOTE ADULT - ASSESSMENT
This is a 38 y/o M w/ PMHx AF (not on AC), HTN, BEA, pseudotumor cerebri s/p LP in 2024, initially presented to [place] on 2/24, SOB, LE edema with URI symptoms and sick contacts, noted to have severe ARDS, intubated however still hypoxia, cannulated for VV ECMO on 2/25, transferred to [facility] on 2/25, started on empiric Vanco, Zosyn for multifocal PNA, abdominal wall cellulitis, s/p trach placement by CT surgery on 3/7, ECMO decannulated on 3/7. Zosyn held on 3/9.  C/b fevers on 3/10, restarted on Zosyn, transferred to [facility] on 3/13, Bcx now w/ yeast.    #Candida albicans fungemia   #Fevers   #Multifocal PNA, abdominal wall cellulitis s/p Vanco/Zosyn  #ARDS, hypoxic respiratory failure requiring VV EMCO 2/2 multifocal PNA? s/p decannulation on 3/7    Overall, 38 y/o M w/ PMHx AF (not on AC), HTN, BEA, pseudotumor cerebri s/p LP in 2024 admitted to [facility] on 2/26 for ARDS, hypoxic respiratory failure requiring VV EMCO 2/2 multifocal PNA? s/p decannulation on 3/7, c/b abdominal wall cellulitis s/p Vancomycin/Zosyn, s/p trach on 3/7, in the setting of persistent fevers despite Zosyn, BCx now w/ yeast, Candida albicans   Unclear source for yeast in BCx, pt had all central lines removed on 3/7, not getting TPN, no abdominal pain on exam, PEG site well appearing.     CT A/P w/o clear abdominal source of fungemia, possibly 2/2 abdominal wall cellulitis? TTE w/o IE.  BCx 3/18 1/2 positive, 3/20 NGTD x 2.     Recommendations;   1. Caspofungin 70 mg daily (given weight) (would avoid fluconazole for now given high dose needed). Would plan for 2 week course after negative BCx  (if 3/20 remains NG, then 4/2/25)  2. Repeat BCx on 3/20 NG    Thank you for consulting us and involving us in the management of this patient's case. In addition to reviewing history, imaging, documents, labs, microbiology, and infection control strategies and potential issues.     ID will continue to follow    Shailesh Owens M.D.  Attending Physician  Division of Infectious Diseases  Department of Medicine    Please contact through MS Teams message.  Office: [phone] (after 5 PM or weekend)

## 2025-03-27 NOTE — PROGRESS NOTE ADULT - SUBJECTIVE AND OBJECTIVE BOX
Infectious Diseases Follow Up:    Patient is a 39y old  Male who presents with a chief complaint of sob (02 Mar 2025 06:19)      Interval History/ROS:  No acute events noted     Allergies  No Known Allergies        ANTIMICROBIALS:  caspofungin IVPB 70 every 24 hours      Current Abx:     Previous Abx     OTHER MEDS:  MEDICATIONS  (STANDING):  acetaminophen     Tablet .. 650 every 6 hours PRN  acetaminophen     Tablet .. 650 every 6 hours  albuterol/ipratropium for Nebulization 3 every 6 hours  amLODIPine   Tablet 10 daily  apixaban 5 every 12 hours  cloNIDine 0.2 every 8 hours  dextrose 50% Injectable 25 once  dextrose 50% Injectable 12.5 once  dextrose 50% Injectable 25 once  furosemide    Tablet 40 two times a day  gabapentin 400 every 8 hours  glucagon  Injectable 1 once  HYDROmorphone  Injectable 0.5 every 6 hours PRN  insulin lispro (ADMELOG) corrective regimen sliding scale  three times a day before meals  melatonin 3 at bedtime  oxyCODONE    IR 5 every 4 hours PRN  pantoprazole    Tablet 40 before breakfast  polyethylene glycol 3350 17 daily  QUEtiapine 25 at bedtime  senna 2 at bedtime      Vital Signs Last 24 Hrs  T(C): 37.1 (27 Mar 2025 04:00), Max: 37.3 (27 Mar 2025 00:00)  T(F): 98.7 (27 Mar 2025 04:00), Max: 99.1 (27 Mar 2025 00:00)  HR: 112 (27 Mar 2025 11:21) (100 - 120)  BP: 113/72 (27 Mar 2025 04:00) (113/72 - 129/71)  BP(mean): 85 (27 Mar 2025 04:00) (79 - 91)  RR: 24 (27 Mar 2025 04:00) (22 - 26)  SpO2: 98% (27 Mar 2025 11:21) (96% - 99%)    Parameters below as of 27 Mar 2025 09:17  Patient On (Oxygen Delivery Method): nasal cannula      PHYSICAL EXAM:  GENERAL: NAD  HEAD:  Atraumatic, Normocephalic  EYES: EOMI, conjunctiva and sclera clear  NECK: +trach with speaking valve capped,    CHEST/LUNG: On NC, CTAB  HEART: RRR  ABDOMEN: Soft, Nontender, Nondistended;  PSYCH: AAOx3                          8.3    9.33  )-----------( 463      ( 27 Mar 2025 06:20 )             26.1       03-27    139  |  95[L]  |  5[L]  ----------------------------<  101[H]  3.5   |  33[H]  |  0.86    Ca    9.1      27 Mar 2025 06:20  Phos  5.7     03-27  Mg     1.70     03-27        Urinalysis Basic - ( 27 Mar 2025 06:20 )    Color: x / Appearance: x / SG: x / pH: x  Gluc: 101 mg/dL / Ketone: x  / Bili: x / Urobili: x   Blood: x / Protein: x / Nitrite: x   Leuk Esterase: x / RBC: x / WBC x   Sq Epi: x / Non Sq Epi: x / Bacteria: x        MICROBIOLOGY:  v  Trach Asp Tracheal Aspirate  03-21-25   Commensal kyle consistent with body site  --    Few polymorphonuclear leukocytes per low power field  No Squamous epithelial cells per low power field  No organisms seen per oil power field      Blood Blood-Peripheral  03-20-25   No growth at 5 days  --  --      Blood Blood-Venous  03-20-25   No growth at 5 days  --  --      Blood Blood-Peripheral  03-18-25   No growth at 5 days  --  Blood Culture PCR  Candida albicans      Nares/Axilla/Groin Nares/Axilla/Groin  03-17-25   Culture NEGATIVE for Candida auris.  This surveillance culture is intended for Infection Control purposes only.  A negative result does not preclude the carriage of other fungal  organisms.  --  --      Blood Blood-Venous  03-16-25   Growth in aerobic bottle: Candida albicans  See previous culture [id number]  --    Growth in aerobic bottle: Yeast like cells      Blood Blood-Peripheral  03-16-25   Growth in aerobic bottle: Candida albicans  See previous culture [id number]  --    Growth in aerobic bottle: Yeast like cells      Trach Asp Tracheal Aspirate  03-16-25   Commensal kyle consistent with body site  --    Few polymorphonuclear leukocytes per low power field  Rare Squamous epithelial cells per low power field  Rare Gram Positive Rods seen per oil power field  Rare Gram Negative Rods seen per oil power field      Blood Blood-Venous  03-15-25   Growth in aerobic bottle: Candida albicans  See previous culture [id number]  --    Growth in aerobic bottle: Yeast like cells      Blood Blood-Peripheral  03-15-25   Growth in aerobic bottle: Candida albicans  Direct identification is available within approximately 3-5  hours either by Blood Panel Multiplexed PCR or Direct  MALDI-TOF. Details: [email or web address]  --  Blood Culture PCR  Candida albicans      Catheterized Catheterized  03-12-25   <10,000 CFU/mL Normal Urogenital Kyle  --  --      Blood Blood-Peripheral  03-11-25   No growth at 5 days  --  --      Blood Blood-Peripheral  03-11-25   No growth at 5 days  --  --      Trach Asp Tracheal Aspirate  03-10-25   Commensal kyle consistent with body site  --    Few polymorphonuclear leukocytes per low power field  No Squamous epithelial cells per low power field  No organisms seen per oil power field      Bronchial Bronchial Lavage  03-05-25   No growth  --    Rare polymorphonuclear leukocytes per low power field  No squamous epithelial cells per low power field  No organisms seen per oil power field      Bronchial Bronchial Lavage  03-01-25   No growth to date  --    Few polymorphonuclear leukocytes per low power field  No Squamous epithelial cells per low power field  No organisms seen per oil power field      Bronchial Bronchial Lavage  02-26-25   No growth  --    Moderate polymorphonuclear leukocytes seen per low power field  No squamous epithelial cells seen per low power field  No organisms seen per oil power field      .Blood Blood  02-26-25   No growth at 5 days  --  --      Catheterized Catheterized  02-26-25   No growth  --  --                RADIOLOGY:

## 2025-03-27 NOTE — PROGRESS NOTE ADULT - SUBJECTIVE AND OBJECTIVE BOX
CHIEF COMPLAINT: Patient is a 39y old  Male who presents with a chief complaint of sob (02 Mar 2025 06:19)      INTERVAL EVENTS: Pain mgt called for LE pain     ROS: Seen by bedside during AM rounds     OBJECTIVE:  ICU Vital Signs Last 24 Hrs  T(C): 37.1 (27 Mar 2025 04:00), Max: 37.3 (27 Mar 2025 00:00)  T(F): 98.7 (27 Mar 2025 04:00), Max: 99.1 (27 Mar 2025 00:00)  HR: 107 (27 Mar 2025 07:23) (100 - 120)  BP: 113/72 (27 Mar 2025 04:00) (113/72 - 135/75)  BP(mean): 85 (27 Mar 2025 04:00) (79 - 91)  ABP: --  ABP(mean): --  RR: 24 (27 Mar 2025 04:00) (22 - 26)  SpO2: 99% (27 Mar 2025 07:23) (95% - 100%)    O2 Parameters below as of 27 Mar 2025 04:00  Patient On (Oxygen Delivery Method): nasal cannula  O2 Flow (L/min): 4            03-26 @ 07:01  -  03-27 @ 07:00  --------------------------------------------------------  IN: 500 mL / OUT: 6000 mL / NET: -5500 mL      CAPILLARY BLOOD GLUCOSE      POCT Blood Glucose.: 112 mg/dL (26 Mar 2025 21:33)      PHYSICAL EXAM:  General: NAD, in bed with wound care evaluating   HEENT: +TC with , able to phonate  Respiratory: lungs CTA b/l, no rales/rhonchi or wheeze, normal respiratory effort  Cardiovascular: +sinus tachycardia  Abdomen: soft, non tender, non distended, +PEG  Extremities: b/l LE in c/d/i dressings  Skin: warm, dry + open sacral wound with blood oozing dsd and bleeding stopped  Neurological: AAO x 4, awake and alert, follows commands, no gross focal deficits, moving extremities freely   Psychiatry: normal affect and behavior        HOSPITAL MEDICATIONS:  MEDICATIONS  (STANDING):  acetaminophen     Tablet .. 650 milliGRAM(s) Oral every 6 hours  albuterol/ipratropium for Nebulization 3 milliLiter(s) Nebulizer every 6 hours  amLODIPine   Tablet 10 milliGRAM(s) Oral daily  apixaban 5 milliGRAM(s) Oral every 12 hours  caspofungin IVPB 70 milliGRAM(s) IV Intermittent every 24 hours  chlorhexidine 0.12% Liquid 15 milliLiter(s) Swish and Spit two times a day  chlorhexidine 2% Cloths 1 Application(s) Topical <User Schedule>  cloNIDine 0.2 milliGRAM(s) Oral every 8 hours  clotrimazole 1% Cream 1 Application(s) Topical two times a day  dextrose 50% Injectable 25 Gram(s) IV Push once  dextrose 50% Injectable 12.5 Gram(s) IV Push once  dextrose 50% Injectable 25 Gram(s) IV Push once  furosemide    Tablet 40 milliGRAM(s) Oral two times a day  gabapentin 400 milliGRAM(s) Oral every 8 hours  glucagon  Injectable 1 milliGRAM(s) IntraMuscular once  insulin lispro (ADMELOG) corrective regimen sliding scale   SubCutaneous three times a day before meals  lidocaine   4% Patch 1 Patch Transdermal daily  lidocaine   4% Patch 1 Patch Transdermal daily  melatonin 3 milliGRAM(s) Oral at bedtime  multivitamin/minerals/iron Oral Solution (CENTRUM) 15 milliLiter(s) Oral daily  pantoprazole    Tablet 40 milliGRAM(s) Oral before breakfast  polyethylene glycol 3350 17 Gram(s) Oral daily  QUEtiapine 25 milliGRAM(s) Oral at bedtime  senna 2 Tablet(s) Oral at bedtime    MEDICATIONS  (PRN):  acetaminophen     Tablet .. 650 milliGRAM(s) Oral every 6 hours PRN Temp greater or equal to 38C (100.4F), Mild Pain (1 - 3), Moderate Pain (4 - 6)  HYDROmorphone  Injectable 0.5 milliGRAM(s) IV Push every 6 hours PRN MOd-Severe Pain (4 - 10)  oxyCODONE    IR 5 milliGRAM(s) Oral every 4 hours PRN MOD/SEVERE PAIN 4-10      LABS:                        7.7    8.29  )-----------( 489      ( 26 Mar 2025 05:25 )             23.1     03-26    139  |  91[L]  |  5[L]  ----------------------------<  95  3.9   |  38[H]  |  0.79    Ca    9.0      26 Mar 2025 05:25  Phos  5.5     03-26  Mg     1.70     03-26        Urinalysis Basic - ( 26 Mar 2025 05:25 )    Color: x / Appearance: x / SG: x / pH: x  Gluc: 95 mg/dL / Ketone: x  / Bili: x / Urobili: x   Blood: x / Protein: x / Nitrite: x   Leuk Esterase: x / RBC: x / WBC x   Sq Epi: x / Non Sq Epi: x / Bacteria: x        Venous Blood Gas:  03-27 @ 06:20  7.54/42/193/36/99.4  VBG Lactate: --  Venous Blood Gas:  03-26 @ 06:25  7.42/65/73/42/94.7  VBG Lactate: 1.0

## 2025-03-27 NOTE — CONSULT NOTE ADULT - SUBJECTIVE AND OBJECTIVE BOX
Chief Complaint: unrelieved bilateral leg pain  HPI:  40 yo M w/ class III obesity, pAfib (no AC), HTN, BEA (on CPAP), history of b/l papilledema attributed to pseudotumor cerebri s/p LP in 2024 with very high opening pressure, who is transferred for VV ECMO for ARDS and severe hypoxia. Patient initially presented to [facility] on 2/24 for SOB and b/l LE edema. The patient's family endorses that he has had progressive leg swelling shortness of breath, dyspnea, and deconditioning for the past several months and had to take disability from his job at the [employer]. He is a current every day smoker of Newports and marijuana, but does not drink or do other drugs. Currently lives with his mother. There is a long term partner in his life, but they are not , and they have an on/off relationship currently not very involved with each other as per the patient's mother and aunt.  At [facility] where he was getting an eval last year for shortness of breath, he had a sleep study and was diagnosed with sleep apnea, prescribed a PAP machine, which he has in his room but the mother is not sure how many nights per week he uses it. Recently, the last day or two, his mother and brother have been under the weather with URIs and the patient 2 days ago started to develop a runny nose, cough, some wheezing of the chest, as well as worsening shortness of breath and fevers at home. He called his aunt who told him to go get checked out and then he landed at the [facility] ED. Patient found to be reportedly hypoxemic to 70% on RA with worsening respiratory status/secretions and somnolence in the ED. Patient was intubated with difficulty (7 attempts) due to very large tongue secretions. Despite optimal vent management, patient remained hypoxemic. Unable to prone due to obesity. Patient cannulated for VV ECMO on 2/25 and transferred to [facility] for further management.   [facility] labs notable for MRSA PCR -, Fluvid -, urine legionella -, sputum gram stain  with GPC and GPR    CTA chest on 2/24 negative for pulmonary embolism, notable for patchy bilateral lower lobe opacities, hepatomegaly, mild ascites, edema/induration of the abdominal pannus.    LE duplex on 2/25 negative for DVT    TTE on 2/25 showed LV EF 61%, enlarged RV with TAPSE 2.1cm, ePASP at least 49 (Patient had 10/2024 TTE with normal LV and RV with ePASP 14).    Only home med is Lasix. Not on acetazolamide for pseudotumor or on Wegovy (was pending auth) (26 Feb 2025 01:48)    Patient seen at the bedside, sitting up in bed, accompanied by a visitor.  Patient is awake, alert and oriented x4.  Patient was consulted for help with pain management of bilateral leg pain shooting down from his bilateral hips.  Patient describes the pain as shooting, burning, like a “pins and needles” sensation.  Patient has not tried to bear weight on his bilateral legs.  He has used an assisted device to transfer to the chair.  Patient smokes marijuana 1-2 joints daily and quit smoking cigarettes a week prior to admission.  Patient states he is going to rehab once he is stable.                                                                                                                                PAST MEDICAL & SURGICAL HISTORY:  HTN (hypertension)  Atrial fibrillation  paroxysmal  Papilledema of both eyes  Chronic sinusitis  Morbid obesity    No significant past surgical history    FAMILY HISTORY: unknown      SOCIAL HISTORY:  [x ] Denies Alcohol, or Drug Use  Patient does smoke 1-2 joints of marijuana daily for recreational use.   Patient used to smoke cigarettes, quit 1 week prior to admission, may consider nicotine patch.    Allergies  No Known Allergies    PAIN MEDICATIONS:  acetaminophen     Tablet .. 650 milliGRAM(s) Oral every 6 hours PRN  acetaminophen     Tablet .. 650 milliGRAM(s) Oral every 6 hours  gabapentin 400 milliGRAM(s) Oral every 8 hours  HYDROmorphone  Injectable 0.5 milliGRAM(s) IV Push every 6 hours PRN  melatonin 3 milliGRAM(s) Oral at bedtime  oxyCODONE    IR 5 milliGRAM(s) Oral every 4 hours PRN  QUEtiapine 25 milliGRAM(s) Oral at bedtime    Heme:  apixaban 5 milliGRAM(s) Oral every 12 hours    Antibiotics:  caspofungin IVPB 70 milliGRAM(s) IV Intermittent every 24 hours    Cardiovascular:  amLODIPine   Tablet 10 milliGRAM(s) Oral daily  cloNIDine 0.2 milliGRAM(s) Oral every 8 hours  furosemide    Tablet 40 milliGRAM(s) Oral two times a day    GI:  pantoprazole    Tablet 40 milliGRAM(s) Oral before breakfast  polyethylene glycol 3350 17 Gram(s) Oral daily  senna 2 Tablet(s) Oral at bedtime    Endocrine:  dextrose 50% Injectable 25 Gram(s) IV Push once  dextrose 50% Injectable 12.5 Gram(s) IV Push once  dextrose 50% Injectable 25 Gram(s) IV Push once  glucagon  Injectable 1 milliGRAM(s) IntraMuscular once  insulin lispro (ADMELOG) corrective regimen sliding scale   SubCutaneous three times a day before meals    All Other Medications:  chlorhexidine 0.12% Liquid 15 milliLiter(s) Swish and Spit two times a day  chlorhexidine 2% Cloths 1 Application(s) Topical <User Schedule>  clotrimazole 1% Cream 1 Application(s) Topical two times a day  lidocaine   4% Patch 1 Patch Transdermal daily  lidocaine   4% Patch 1 Patch Transdermal daily  multivitamin/minerals/iron Oral Solution (CENTRUM) 15 milliLiter(s) Oral daily      Vital Signs Last 24 Hrs  T(C): 37.1 (27 Mar 2025 04:00), Max: 37.3 (27 Mar 2025 00:00)  T(F): 98.7 (27 Mar 2025 04:00), Max: 99.1 (27 Mar 2025 00:00)  HR: 112 (27 Mar 2025 11:21) (100 - 120)  BP: 113/72 (27 Mar 2025 04:00) (113/72 - 135/75)  BP(mean): 85 (27 Mar 2025 04:00) (79 - 91)  RR: 24 (27 Mar 2025 04:00) (22 - 26)  SpO2: 98% (27 Mar 2025 11:21) (95% - 99%)    Parameters below as of 27 Mar 2025 09:17  Patient On (Oxygen Delivery Method): nasal cannula        PAIN SCORE:   6-8/10      SCALE USED: (1-10 VNRS)           LABS:                          8.3    9.33  )-----------( 463      ( 27 Mar 2025 06:20 )             26.1     03-27    139  |  95[L]  |  5[L]  ----------------------------<  101[H]  3.5   |  33[H]  |  0.86    Ca    9.1      27 Mar 2025 06:20  Phos  5.7     03-27  Mg     1.70     03-27        Urinalysis Basic - ( 27 Mar 2025 06:20 )    Color: x / Appearance: x / SG: x / pH: x  Gluc: 101 mg/dL / Ketone: x  / Bili: x / Urobili: x   Blood: x / Protein: x / Nitrite: x   Leuk Esterase: x / RBC: x / WBC x   Sq Epi: x / Non Sq Epi: x / Bacteria: x    [x ]  [place]  Reviewed, Reference #[id number] no medications found      PHYSICAL EXAM:  GENERAL: Alert & Oriented x 3 in NAD, well-groomed, well-developed, obese, unable to walk to chair independently, needs assisted device.     Impression/Plan: Requested by NP Rachel to help manage pain.   Recommendations:  -  Consider changing po Acetaminophen to Acetaminophen 650mg every 8 hours standing x2 days, then PRN for pain.  Do not give within 8 hours of last dose of Acetaminophen.    -  Consider Increasing Gabapentin to 400mg every 8 hours.  Hold for oversedation.  -  Consider ordering Lidocaine patch to bilateral thighs. 12 hours on and 12 hours off x 5 days.  Make sure not to place on any open wounds.  -  Consider changing Oxycodone 5mg order to Oxycodone 7.5mg q 4 hours PRN for moderate to severe pain (4-10). Hold for oversedation. Not to be given within 1 hour of any other immediate acting opioid.     -  Consider weaning off IV Dilaudid 0.5mg:  *** MUST TAKE PO OXYCODONE FIRST!  IV Dilaudid to be given only if pain unrelieved by Oxycodone.  Hold for oversedation. Not to be given within 1 hour of any other immediate acting opioid.     Q 6 hours PRN for severe breakthrough pain x 1 more day, then tomorrow  Q8 hours PRN for  severe breakthrough pain x 2 days, then  Q 12 hours PRN for severe breakthrough pain x 2 days, then  Once a day PRN for severe breakthrough pain, then discontinue.     -  Recommend maintaining continuous pulse oximetry.  -  Recommend Physical Therapy consult for TENS therapy and strengthening exercises.  -  Recommend Holistic RN for complementary and alternative therapies for pain, using a mid-body-spirit-emotion approach.  -  Recommend MRI lumbar back to rule out radiculopathy based on patient’s pain presentation.   -  Recommend follow up with outpatient pain management, and wound care management.  -  Recommend follow up with Chronic Pain doctor when discharged. If patient does not have a Chronic Pain doctor, may acquire one through patient's personal insurance carrier.    Discussed patient with Chronic Pain Attending on call, Dr. Mcknight, who agrees with the above recommendations.  No further recommendations at this time, Chronic pain service to sign off. May call Chronic Pain Service if needed.   Thank you.

## 2025-03-27 NOTE — PROGRESS NOTE ADULT - NS ATTEND AMEND GEN_ALL_CORE FT
Pt is a 39M with MHx obesity (Class III, BMI 69), pAfib (no AC), HTN, BEA (dz'ed 2019, AHI 34 non-compliant on CPAP set at 10), and history of b/l papilledema attributed to pseudotumor cerebri initially presenting as a transfer [facility] on 2/26 for acute hypoxemic respiratory failure s/p ETT intubation/MV with progression to refractory ARDS s/p initiation of vv-ECMO support (2/26-3/7) with failure to wean from ventilator s/p tracheostomy placement, further found to have RV failure s/p aggressive diuresis and PNA followed by severe sepsis 2/2 panniculitis c/b candida albicans fungemia now transferred to [facility] for further medical management.     Pt now awake and alert, spontaneously communicative and at mental status baseline. Continues to have severe functional debility likely 2/2 prolonged critical illness polyneuropathy but is progressing well. Now able to transfer via [place] to chair daily and is independent of UE and fine motor function. Off all sedation. Weaning of catapres initiated 2/25 (decreased to 0.2mg TID.) Weaning pain control, pt remains dependent on Dilaudid prn for LE neuropathic pain. PO regimen added and gabapentin increased without benefit. Pain mgmt team consulted, recs appreciated.    Pt with acute combined hypoxemic and hypercapnic respiratory failure with failure to wean from ventilator s/p tracheostomy placement. Now tolerating TC QD, PSV (18/10) qhs. Now on TC trial, using oral interface for qhs Bilevel support. Airway clearance therapy in place. Wean O2 supplementation for goal O2 saturation 90-95%. Aspiration precautions and oral hygiene in place. Trach care and suctioning as per [facility] routine. Pt phonating well, no issues with mucous expectoration or swallow impairment. Possible decannulation tomorrow.    Pt further presenting with acute on chronic hypercapnia and will require Bilevel after discharge for chronic use 2/2 chronic hypercapnic respiratory failure from hypoventilation and obesity hypoventilation syndrome (BMI 69.) Pt has exhibited an inability to maintain appropriate ventilation with persistent hypercapnia without Bilevel support qhs. Pt has high CO2 levels (PaCO2 on ABG >52.) PaCO2 done immediately upon awakening while breathing his prescribed FiO2 shows the patient's PaCO2 worsened >7mmHg compared to initial ABG. Patient's OHS requires ventilatory support. Due to the patient's severe disease state and severe dyspnea, Bilevel is necessary. Due to the patient's decreased ventilatory drive and high CO2 levels, without Bilevel support patient will experience rapid clinical deterioration, which will lead to serious medical harm. Acute on chronic hypercapnic respiratory failure due to patient (BMI 69) obesity, which is causing restriction of the thoracic cage not allowing for proper gas exchange.     Pt initially p/w RV failure 2/2 pulmHTN now s/p aggressive diuresis with improvement. Now transitioned to PO diuretics with goal to maintain euvolemia. Acetazolamide added today. AFib well controlled conservatively, will continue to monitor on telemetry. TTE 2/25 with new RVE, 3/11 with RVSD. DVT noted on repeat duplex of the right IJ and bilateral peroneal veins. Will c/w full A/C on Eliquis for DVT and AFib.     Pt with oropharyngeal dysphagia s/p PEG placement (3/7) now transitioned to PO diet (3/20) via FEEST. Will continue to monitor on soft diet. Bowel regimen in place prn. PPI ppx. Transaminitis improving.     Pt further found to have severe sepsis 2/ candida albicans fungemia possibly from panniculitis (3/15, cleared 3/18) now on caspofungin with plan to complete 14 day course from clearance. TTE (-) endocarditis. Wound care team following. ID recs appreciated.     Dispo pending medical optimization. Pt full code. DVT ppx in place. Palliative consulted, recs appreciated. Will continue to update family and discuss plan of care throughout hospitalization.

## 2025-03-28 LAB
ANION GAP SERPL CALC-SCNC: 10 MMOL/L — SIGNIFICANT CHANGE UP (ref 7–14)
BASE EXCESS BLDV CALC-SCNC: 10.1 MMOL/L — HIGH (ref -2–3)
BUN SERPL-MCNC: 6 MG/DL — LOW (ref 7–23)
CALCIUM SERPL-MCNC: 9.3 MG/DL — SIGNIFICANT CHANGE UP (ref 8.4–10.5)
CHLORIDE SERPL-SCNC: 93 MMOL/L — LOW (ref 98–107)
CO2 BLDV-SCNC: 40 MMOL/L — HIGH (ref 22–26)
CO2 SERPL-SCNC: 35 MMOL/L — HIGH (ref 22–31)
CREAT SERPL-MCNC: 0.94 MG/DL — SIGNIFICANT CHANGE UP (ref 0.5–1.3)
EGFR: 107 ML/MIN/1.73M2 — SIGNIFICANT CHANGE UP
EGFR: 107 ML/MIN/1.73M2 — SIGNIFICANT CHANGE UP
GLUCOSE BLDC GLUCOMTR-MCNC: 103 MG/DL — HIGH (ref 70–99)
GLUCOSE BLDC GLUCOMTR-MCNC: 108 MG/DL — HIGH (ref 70–99)
GLUCOSE BLDC GLUCOMTR-MCNC: 118 MG/DL — HIGH (ref 70–99)
GLUCOSE BLDC GLUCOMTR-MCNC: 96 MG/DL — SIGNIFICANT CHANGE UP (ref 70–99)
GLUCOSE SERPL-MCNC: 101 MG/DL — HIGH (ref 70–99)
HCO3 BLDV-SCNC: 38 MMOL/L — HIGH (ref 22–29)
HCT VFR BLD CALC: 25.6 % — LOW (ref 39–50)
HGB BLD-MCNC: 8.5 G/DL — LOW (ref 13–17)
MAGNESIUM SERPL-MCNC: 1.7 MG/DL — SIGNIFICANT CHANGE UP (ref 1.6–2.6)
MCHC RBC-ENTMCNC: 30 PG — SIGNIFICANT CHANGE UP (ref 27–34)
MCHC RBC-ENTMCNC: 33.2 G/DL — SIGNIFICANT CHANGE UP (ref 32–36)
MCV RBC AUTO: 90.5 FL — SIGNIFICANT CHANGE UP (ref 80–100)
NRBC # BLD AUTO: 0 K/UL — SIGNIFICANT CHANGE UP (ref 0–0)
NRBC # FLD: 0 K/UL — SIGNIFICANT CHANGE UP (ref 0–0)
NRBC BLD AUTO-RTO: 0 /100 WBCS — SIGNIFICANT CHANGE UP (ref 0–0)
PCO2 BLDV: 68 MMHG — HIGH (ref 42–55)
PH BLDV: 7.36 — SIGNIFICANT CHANGE UP (ref 7.32–7.43)
PHOSPHATE SERPL-MCNC: 5.5 MG/DL — HIGH (ref 2.5–4.5)
PLATELET # BLD AUTO: 501 K/UL — HIGH (ref 150–400)
PO2 BLDV: 73 MMHG — HIGH (ref 25–45)
POTASSIUM SERPL-MCNC: 3.5 MMOL/L — SIGNIFICANT CHANGE UP (ref 3.5–5.3)
POTASSIUM SERPL-SCNC: 3.5 MMOL/L — SIGNIFICANT CHANGE UP (ref 3.5–5.3)
RBC # BLD: 2.83 M/UL — LOW (ref 4.2–5.8)
RBC # FLD: 23.9 % — HIGH (ref 10.3–14.5)
SAO2 % BLDV: 96 % — HIGH (ref 67–88)
SODIUM SERPL-SCNC: 138 MMOL/L — SIGNIFICANT CHANGE UP (ref 135–145)
WBC # BLD: 10.47 K/UL — SIGNIFICANT CHANGE UP (ref 3.8–10.5)
WBC # FLD AUTO: 10.47 K/UL — SIGNIFICANT CHANGE UP (ref 3.8–10.5)

## 2025-03-28 PROCEDURE — G0545: CPT

## 2025-03-28 PROCEDURE — 99233 SBSQ HOSP IP/OBS HIGH 50: CPT

## 2025-03-28 PROCEDURE — 99232 SBSQ HOSP IP/OBS MODERATE 35: CPT

## 2025-03-28 RX ORDER — OXYCODONE HYDROCHLORIDE 30 MG/1
10 TABLET ORAL EVERY 4 HOURS
Refills: 0 | Status: DISCONTINUED | OUTPATIENT
Start: 2025-03-28 | End: 2025-04-02

## 2025-03-28 RX ORDER — SODIUM HYPOCHLORITE 0.12 MG/ML
1 SOLUTION TOPICAL DAILY
Refills: 0 | Status: DISCONTINUED | OUTPATIENT
Start: 2025-03-28 | End: 2025-03-31

## 2025-03-28 RX ORDER — ACETAMINOPHEN 500 MG/5ML
650 LIQUID (ML) ORAL EVERY 8 HOURS
Refills: 0 | Status: COMPLETED | OUTPATIENT
Start: 2025-03-28 | End: 2025-03-30

## 2025-03-28 RX ORDER — ACETAMINOPHEN 500 MG/5ML
650 LIQUID (ML) ORAL EVERY 6 HOURS
Refills: 0 | Status: DISCONTINUED | OUTPATIENT
Start: 2025-03-31 | End: 2025-04-25

## 2025-03-28 RX ADMIN — Medication 0.5 MILLIGRAM(S): at 21:21

## 2025-03-28 RX ADMIN — CASPOFUNGIN ACETATE 260 MILLIGRAM(S): 5 INJECTION, POWDER, LYOPHILIZED, FOR SOLUTION INTRAVENOUS at 03:05

## 2025-03-28 RX ADMIN — Medication 1 APPLICATION(S): at 06:09

## 2025-03-28 RX ADMIN — Medication 3 MILLIGRAM(S): at 21:00

## 2025-03-28 RX ADMIN — GABAPENTIN 400 MILLIGRAM(S): 400 CAPSULE ORAL at 06:04

## 2025-03-28 RX ADMIN — Medication 0.5 MILLIGRAM(S): at 03:00

## 2025-03-28 RX ADMIN — QUETIAPINE FUMARATE 25 MILLIGRAM(S): 25 TABLET ORAL at 21:01

## 2025-03-28 RX ADMIN — LIDOCAINE HYDROCHLORIDE 1 PATCH: 20 JELLY TOPICAL at 11:36

## 2025-03-28 RX ADMIN — OXYCODONE HYDROCHLORIDE 7.5 MILLIGRAM(S): 30 TABLET ORAL at 06:57

## 2025-03-28 RX ADMIN — IPRATROPIUM BROMIDE AND ALBUTEROL SULFATE 3 MILLILITER(S): .5; 2.5 SOLUTION RESPIRATORY (INHALATION) at 21:27

## 2025-03-28 RX ADMIN — LIDOCAINE HYDROCHLORIDE 1 PATCH: 20 JELLY TOPICAL at 19:00

## 2025-03-28 RX ADMIN — APIXABAN 5 MILLIGRAM(S): 2.5 TABLET, FILM COATED ORAL at 06:09

## 2025-03-28 RX ADMIN — GABAPENTIN 400 MILLIGRAM(S): 400 CAPSULE ORAL at 21:01

## 2025-03-28 RX ADMIN — IPRATROPIUM BROMIDE AND ALBUTEROL SULFATE 3 MILLILITER(S): .5; 2.5 SOLUTION RESPIRATORY (INHALATION) at 08:49

## 2025-03-28 RX ADMIN — Medication 650 MILLIGRAM(S): at 21:30

## 2025-03-28 RX ADMIN — Medication 0.2 MILLIGRAM(S): at 21:01

## 2025-03-28 RX ADMIN — AMLODIPINE BESYLATE 10 MILLIGRAM(S): 10 TABLET ORAL at 06:10

## 2025-03-28 RX ADMIN — Medication 0.5 MILLIGRAM(S): at 20:51

## 2025-03-28 RX ADMIN — GABAPENTIN 400 MILLIGRAM(S): 400 CAPSULE ORAL at 13:32

## 2025-03-28 RX ADMIN — OXYCODONE HYDROCHLORIDE 7.5 MILLIGRAM(S): 30 TABLET ORAL at 13:06

## 2025-03-28 RX ADMIN — Medication 15 MILLILITER(S): at 11:35

## 2025-03-28 RX ADMIN — OXYCODONE HYDROCHLORIDE 7.5 MILLIGRAM(S): 30 TABLET ORAL at 18:06

## 2025-03-28 RX ADMIN — SODIUM HYPOCHLORITE 1 APPLICATION(S): 0.12 SOLUTION TOPICAL at 21:01

## 2025-03-28 RX ADMIN — CLOTRIMAZOLE 1 APPLICATION(S): 1 CREAM TOPICAL at 17:06

## 2025-03-28 RX ADMIN — Medication 650 MILLIGRAM(S): at 06:05

## 2025-03-28 RX ADMIN — Medication 650 MILLIGRAM(S): at 21:00

## 2025-03-28 RX ADMIN — FUROSEMIDE 40 MILLIGRAM(S): 10 INJECTION INTRAMUSCULAR; INTRAVENOUS at 13:34

## 2025-03-28 RX ADMIN — Medication 15 MILLILITER(S): at 17:06

## 2025-03-28 RX ADMIN — APIXABAN 5 MILLIGRAM(S): 2.5 TABLET, FILM COATED ORAL at 17:07

## 2025-03-28 RX ADMIN — CLOTRIMAZOLE 1 APPLICATION(S): 1 CREAM TOPICAL at 06:04

## 2025-03-28 RX ADMIN — IPRATROPIUM BROMIDE AND ALBUTEROL SULFATE 3 MILLILITER(S): .5; 2.5 SOLUTION RESPIRATORY (INHALATION) at 16:07

## 2025-03-28 RX ADMIN — Medication 40 MILLIGRAM(S): at 06:05

## 2025-03-28 RX ADMIN — OXYCODONE HYDROCHLORIDE 7.5 MILLIGRAM(S): 30 TABLET ORAL at 17:05

## 2025-03-28 RX ADMIN — LIDOCAINE HYDROCHLORIDE 1 PATCH: 20 JELLY TOPICAL at 19:19

## 2025-03-28 RX ADMIN — Medication 650 MILLIGRAM(S): at 17:06

## 2025-03-28 RX ADMIN — Medication 2 TABLET(S): at 21:00

## 2025-03-28 RX ADMIN — FUROSEMIDE 40 MILLIGRAM(S): 10 INJECTION INTRAMUSCULAR; INTRAVENOUS at 06:10

## 2025-03-28 RX ADMIN — Medication 0.2 MILLIGRAM(S): at 13:34

## 2025-03-28 RX ADMIN — IPRATROPIUM BROMIDE AND ALBUTEROL SULFATE 3 MILLILITER(S): .5; 2.5 SOLUTION RESPIRATORY (INHALATION) at 02:55

## 2025-03-28 RX ADMIN — Medication 0.2 MILLIGRAM(S): at 06:05

## 2025-03-28 RX ADMIN — POLYETHYLENE GLYCOL 3350 17 GRAM(S): 17 POWDER, FOR SOLUTION ORAL at 11:35

## 2025-03-28 RX ADMIN — OXYCODONE HYDROCHLORIDE 7.5 MILLIGRAM(S): 30 TABLET ORAL at 12:57

## 2025-03-28 RX ADMIN — Medication 15 MILLILITER(S): at 06:04

## 2025-03-28 RX ADMIN — Medication 650 MILLIGRAM(S): at 11:34

## 2025-03-28 NOTE — PROGRESS NOTE ADULT - NS ATTEND AMEND GEN_ALL_CORE FT
Pt is a 39M with MHx obesity (Class III, BMI 69), pAfib (no AC), HTN, BEA (dz'ed 2019, AHI 34 non-compliant on CPAP set at 10), and history of b/l papilledema attributed to pseudotumor cerebri initially presenting as a transfer [facility] on 2/26 for acute hypoxemic respiratory failure s/p ETT intubation/MV with progression to refractory ARDS s/p initiation of vv-ECMO support (2/26-3/7) with failure to wean from ventilator s/p tracheostomy placement, further found to have RV failure s/p aggressive diuresis and PNA followed by severe sepsis 2/2 panniculitis c/b candida albicans fungemia now transferred to [facility] for further medical management.     Pt now awake and alert, spontaneously communicative and at mental status baseline. Continues to have severe functional debility likely 2/2 prolonged critical illness polyneuropathy but is progressing well. Now able to transfer via Nette to chair daily and is independent of UE and fine motor function. Off all sedation. Weaning of catapres initiated 2/25 (decreased to 0.2mg TID.) Weaning pain control, pt remains dependent on Dilaudid prn for LE neuropathic pain. PO regimen added and gabapentin increased without benefit. Pain mgmt team consulted, recs appreciated.    Pt with acute combined hypoxemic and hypercapnic respiratory failure with failure to wean from ventilator s/p tracheostomy placement. Now tolerating TC QD, PSV (18/10) qhs. Now on TC trial, using oral interface for qhs Bilevel support. Airway clearance therapy in place. Wean O2 supplementation for goal O2 saturation 90-95%. Aspiration precautions and oral hygiene in place. Decannulated at bedside today.    Pt further presenting with acute on chronic hypercapnia and will require Bilevel after discharge for chronic use 2/2 chronic hypercapnic respiratory failure from hypoventilation and obesity hypoventilation syndrome (BMI 69.) Pt has exhibited an inability to maintain appropriate ventilation with persistent hypercapnia without Bilevel support qhs. Pt has high CO2 levels (PaCO2 on ABG >52.) PaCO2 done immediately upon awakening while breathing his prescribed FiO2 shows the patient's PaCO2 worsened >7mmHg compared to initial ABG. Patient's OHS requires ventilatory support. Due to the patient's severe disease state and severe dyspnea, Bilevel is necessary. Due to the patient's decreased ventilatory drive and high CO2 levels, without Bilevel support patient will experience rapid clinical deterioration, which will lead to serious medical harm. Acute on chronic hypercapnic respiratory failure due to patient (BMI 69) obesity, which is causing restriction of the thoracic cage not allowing for proper gas exchange.     Pt initially p/w RV failure 2/2 pulmHTN now s/p aggressive diuresis with improvement. Now transitioned to PO diuretics with goal to maintain euvolemia. Acetazolamide added today. AFib well controlled conservatively, will continue to monitor on telemetry. TTE 2/25 with new RVE, 3/11 with RVSD. DVT noted on repeat duplex of the right IJ and bilateral peroneal veins. Will c/w full A/C on Eliquis for DVT and AFib.     Pt with oropharyngeal dysphagia s/p PEG placement (3/7) now transitioned to PO diet (3/20) via FEEST. Will continue to monitor on soft diet. Bowel regimen in place prn. PPI ppx. Transaminitis improving.     Pt further found to have severe sepsis 2/ candida albicans fungemia possibly from panniculitis (3/15, cleared 3/18) now on caspofungin with plan to complete 14 day course from clearance. TTE (-) endocarditis. Wound care team following. ID recs appreciated.     Dispo pending medical optimization. Pt full code. DVT ppx in place. Palliative consulted, recs appreciated. Will continue to update family and discuss plan of care throughout hospitalization. Pt is a 39M with MHx obesity (Class III, BMI 69), pAfib (no AC), HTN, BEA (dz'ed 2019, AHI 34 non-compliant on CPAP set at 10), and history of b/l papilledema attributed to pseudotumor cerebri initially presenting as a transfer [facility] on 2/26 for acute hypoxemic respiratory failure s/p ETT intubation/MV with progression to refractory ARDS s/p initiation of vv-ECMO support (2/26-3/7) with failure to wean from ventilator s/p tracheostomy placement, further found to have RV failure s/p aggressive diuresis and PNA followed by severe sepsis 2/2 panniculitis c/b candida albicans fungemia now transferred to [facility] for further medical management.     Pt now awake and alert, spontaneously communicative and at mental status baseline. Continues to have severe functional debility likely 2/2 prolonged critical illness polyneuropathy but is progressing well. Now able to transfer via Nette to chair daily and is independent of UE and fine motor function. Off all sedation. Weaning of catapres initiated 2/25 (decreased to 0.2mg TID.) Weaning pain control, pt remains dependent on Dilaudid prn for LE neuropathic pain. PO regimen added and gabapentin increased without benefit. Pain mgmt team consulted, recs appreciated. Today there was concern for worsening coolness of toes i/s/o severe pain. Podiatry consulted.     Pt with acute combined hypoxemic and hypercapnic respiratory failure with failure to wean from ventilator s/p tracheostomy placement. Now tolerating TC QD, PSV (18/10) qhs. Now on TC trial, using oral interface for qhs Bilevel support. Airway clearance therapy in place. Wean O2 supplementation for goal O2 saturation 90-95%. Aspiration precautions and oral hygiene in place. Decannulated at bedside today.    Pt further presenting with acute on chronic hypercapnia and will require Bilevel after discharge for chronic use 2/2 chronic hypercapnic respiratory failure from hypoventilation and obesity hypoventilation syndrome (BMI 69.) Pt has exhibited an inability to maintain appropriate ventilation with persistent hypercapnia without Bilevel support qhs. Pt has high CO2 levels (PaCO2 on ABG >52.) PaCO2 done immediately upon awakening while breathing his prescribed FiO2 shows the patient's PaCO2 worsened >7mmHg compared to initial ABG. Patient's OHS requires ventilatory support. Due to the patient's severe disease state and severe dyspnea, Bilevel is necessary. Due to the patient's decreased ventilatory drive and high CO2 levels, without Bilevel support patient will experience rapid clinical deterioration, which will lead to serious medical harm. Acute on chronic hypercapnic respiratory failure due to patient (BMI 69) obesity, which is causing restriction of the thoracic cage not allowing for proper gas exchange.     Pt initially p/w RV failure 2/2 pulmHTN now s/p aggressive diuresis with improvement. Now transitioned to PO diuretics with goal to maintain euvolemia. Acetazolamide added today. AFib well controlled conservatively, will continue to monitor on telemetry. TTE 2/25 with new RVE, 3/11 with RVSD. DVT noted on repeat duplex of the right IJ and bilateral peroneal veins. Will c/w full A/C on Eliquis for DVT and AFib.     Pt with oropharyngeal dysphagia s/p PEG placement (3/7) now transitioned to PO diet (3/20) via FEEST. Will continue to monitor on soft diet. Bowel regimen in place prn. PPI ppx. Transaminitis improving.     Pt further found to have severe sepsis 2/ candida albicans fungemia possibly from panniculitis (3/15, cleared 3/18) now on caspofungin with plan to complete 14 day course from clearance. TTE (-) endocarditis. Wound care team following. ID recs appreciated. Further found to have worsening sacral ulceration with induration, will obtain CT to r/o evolving abscess/collection.    Dispo pending medical optimization. Pt full code. DVT ppx in place. Palliative consulted, recs appreciated. Will continue to update family and discuss plan of care throughout hospitalization.

## 2025-03-28 NOTE — PROGRESS NOTE ADULT - SUBJECTIVE AND OBJECTIVE BOX
CHIEF COMPLAINT: Patient is a 39y old  Male who presents with a chief complaint of sob (02 Mar 2025 06:19)      INTERVAL EVENTS: Pain mgt consult     ROS: Seen by bedside during AM rounds     OBJECTIVE:  ICU Vital Signs Last 24 Hrs  T(C): 37 (28 Mar 2025 00:00), Max: 37 (28 Mar 2025 00:00)  T(F): 98.6 (28 Mar 2025 00:00), Max: 98.6 (28 Mar 2025 00:00)  HR: 103 (28 Mar 2025 04:00) (100 - 116)  BP: 132/82 (28 Mar 2025 04:00) (117/67 - 137/82)  BP(mean): 97 (28 Mar 2025 04:00) (79 - 97)  ABP: --  ABP(mean): --  RR: 18 (28 Mar 2025 04:00) (18 - 24)  SpO2: 100% (28 Mar 2025 04:00) (97% - 100%)    O2 Parameters below as of 28 Mar 2025 04:00  Patient On (Oxygen Delivery Method): nasal cannula  O2 Flow (L/min): 4            03-27 @ 07:01  -  03-28 @ 07:00  --------------------------------------------------------  IN: 600 mL / OUT: 2425 mL / NET: -1825 mL      CAPILLARY BLOOD GLUCOSE      POCT Blood Glucose.: 118 mg/dL (27 Mar 2025 22:08)      PHYSICAL EXAM:  General: NAD, in bed with wound care evaluating   HEENT: +TC with , able to phonate  Respiratory: lungs CTA b/l, no rales/rhonchi or wheeze, normal respiratory effort  Cardiovascular: +sinus tachycardia  Abdomen: soft, non tender, non distended, +PEG  Extremities: b/l LE in c/d/i dressings  Skin: warm, dry + open sacral wound with blood oozing dsd and bleeding stopped  Neurological: AAO x 4, awake and alert, follows commands, no gross focal deficits, moving extremities freely   Psychiatry: normal affect and behavior      HOSPITAL MEDICATIONS:  MEDICATIONS  (STANDING):  acetaminophen     Tablet .. 650 milliGRAM(s) Oral every 6 hours  albuterol/ipratropium for Nebulization 3 milliLiter(s) Nebulizer every 6 hours  amLODIPine   Tablet 10 milliGRAM(s) Oral daily  apixaban 5 milliGRAM(s) Oral every 12 hours  caspofungin IVPB 70 milliGRAM(s) IV Intermittent every 24 hours  chlorhexidine 0.12% Liquid 15 milliLiter(s) Swish and Spit two times a day  chlorhexidine 2% Cloths 1 Application(s) Topical <User Schedule>  cloNIDine 0.2 milliGRAM(s) Oral every 8 hours  clotrimazole 1% Cream 1 Application(s) Topical two times a day  dextrose 50% Injectable 25 Gram(s) IV Push once  dextrose 50% Injectable 12.5 Gram(s) IV Push once  dextrose 50% Injectable 25 Gram(s) IV Push once  furosemide    Tablet 40 milliGRAM(s) Oral two times a day  gabapentin 400 milliGRAM(s) Oral every 8 hours  glucagon  Injectable 1 milliGRAM(s) IntraMuscular once  insulin lispro (ADMELOG) corrective regimen sliding scale   SubCutaneous three times a day before meals  lidocaine   4% Patch 1 Patch Transdermal daily  lidocaine   4% Patch 1 Patch Transdermal daily  melatonin 3 milliGRAM(s) Oral at bedtime  multivitamin/minerals/iron Oral Solution (CENTRUM) 15 milliLiter(s) Oral daily  pantoprazole    Tablet 40 milliGRAM(s) Oral before breakfast  polyethylene glycol 3350 17 Gram(s) Oral daily  QUEtiapine 25 milliGRAM(s) Oral at bedtime  senna 2 Tablet(s) Oral at bedtime    MEDICATIONS  (PRN):  acetaminophen     Tablet .. 650 milliGRAM(s) Oral every 6 hours PRN Temp greater or equal to 38C (100.4F), Mild Pain (1 - 3), Moderate Pain (4 - 6)  HYDROmorphone  Injectable 0.5 milliGRAM(s) IV Push every 6 hours PRN MOd-Severe Pain (4 - 10)  oxyCODONE    IR 7.5 milliGRAM(s) Oral every 4 hours PRN MOD/SEVERE PAIN 4-10      LABS:                        8.3    9.33  )-----------( 463      ( 27 Mar 2025 06:20 )             26.1     03-27    139  |  95[L]  |  5[L]  ----------------------------<  101[H]  3.5   |  33[H]  |  0.86    Ca    9.1      27 Mar 2025 06:20  Phos  5.7     03-27  Mg     1.70     03-27        Urinalysis Basic - ( 27 Mar 2025 06:20 )    Color: x / Appearance: x / SG: x / pH: x  Gluc: 101 mg/dL / Ketone: x  / Bili: x / Urobili: x   Blood: x / Protein: x / Nitrite: x   Leuk Esterase: x / RBC: x / WBC x   Sq Epi: x / Non Sq Epi: x / Bacteria: x        Venous Blood Gas:  03-27 @ 06:20  7.54/42/193/36/99.4  VBG Lactate: --   CHIEF COMPLAINT: Patient is a 39y old  Male who presents with a chief complaint of sob (02 Mar 2025 06:19)      INTERVAL EVENTS: Pain mgt consult     ROS: Seen by bedside during AM rounds     OBJECTIVE:  ICU Vital Signs Last 24 Hrs  T(C): 37 (28 Mar 2025 00:00), Max: 37 (28 Mar 2025 00:00)  T(F): 98.6 (28 Mar 2025 00:00), Max: 98.6 (28 Mar 2025 00:00)  HR: 103 (28 Mar 2025 04:00) (100 - 116)  BP: 132/82 (28 Mar 2025 04:00) (117/67 - 137/82)  BP(mean): 97 (28 Mar 2025 04:00) (79 - 97)  ABP: --  ABP(mean): --  RR: 18 (28 Mar 2025 04:00) (18 - 24)  SpO2: 100% (28 Mar 2025 04:00) (97% - 100%)    O2 Parameters below as of 28 Mar 2025 04:00  Patient On (Oxygen Delivery Method): nasal cannula  O2 Flow (L/min): 4            03-27 @ 07:01  -  03-28 @ 07:00  --------------------------------------------------------  IN: 600 mL / OUT: 2425 mL / NET: -1825 mL      CAPILLARY BLOOD GLUCOSE      POCT Blood Glucose.: 118 mg/dL (27 Mar 2025 22:08)      PHYSICAL EXAM:  General: NAD, in bed with wound care evaluating   HEENT: Decannulated   Respiratory: lungs CTA b/l, no rales/rhonchi or wheeze, normal respiratory effort  Cardiovascular: +sinus tachycardia  Abdomen: soft, non tender, non distended, +PEG  Extremities: b/l LE in c/d/i dressings  Skin: warm, dry + open sacral wound indurated /tender buttock area   Neurological: AAO x 4, awake and alert, follows commands, no gross focal deficits, moving extremities freely   Psychiatry: normal affect and behavior      HOSPITAL MEDICATIONS:  MEDICATIONS  (STANDING):  acetaminophen     Tablet .. 650 milliGRAM(s) Oral every 6 hours  albuterol/ipratropium for Nebulization 3 milliLiter(s) Nebulizer every 6 hours  amLODIPine   Tablet 10 milliGRAM(s) Oral daily  apixaban 5 milliGRAM(s) Oral every 12 hours  caspofungin IVPB 70 milliGRAM(s) IV Intermittent every 24 hours  chlorhexidine 0.12% Liquid 15 milliLiter(s) Swish and Spit two times a day  chlorhexidine 2% Cloths 1 Application(s) Topical <User Schedule>  cloNIDine 0.2 milliGRAM(s) Oral every 8 hours  clotrimazole 1% Cream 1 Application(s) Topical two times a day  dextrose 50% Injectable 25 Gram(s) IV Push once  dextrose 50% Injectable 12.5 Gram(s) IV Push once  dextrose 50% Injectable 25 Gram(s) IV Push once  furosemide    Tablet 40 milliGRAM(s) Oral two times a day  gabapentin 400 milliGRAM(s) Oral every 8 hours  glucagon  Injectable 1 milliGRAM(s) IntraMuscular once  insulin lispro (ADMELOG) corrective regimen sliding scale   SubCutaneous three times a day before meals  lidocaine   4% Patch 1 Patch Transdermal daily  lidocaine   4% Patch 1 Patch Transdermal daily  melatonin 3 milliGRAM(s) Oral at bedtime  multivitamin/minerals/iron Oral Solution (CENTRUM) 15 milliLiter(s) Oral daily  pantoprazole    Tablet 40 milliGRAM(s) Oral before breakfast  polyethylene glycol 3350 17 Gram(s) Oral daily  QUEtiapine 25 milliGRAM(s) Oral at bedtime  senna 2 Tablet(s) Oral at bedtime    MEDICATIONS  (PRN):  acetaminophen     Tablet .. 650 milliGRAM(s) Oral every 6 hours PRN Temp greater or equal to 38C (100.4F), Mild Pain (1 - 3), Moderate Pain (4 - 6)  HYDROmorphone  Injectable 0.5 milliGRAM(s) IV Push every 6 hours PRN MOd-Severe Pain (4 - 10)  oxyCODONE    IR 7.5 milliGRAM(s) Oral every 4 hours PRN MOD/SEVERE PAIN 4-10      LABS:                        8.3    9.33  )-----------( 463      ( 27 Mar 2025 06:20 )             26.1     03-27    139  |  95[L]  |  5[L]  ----------------------------<  101[H]  3.5   |  33[H]  |  0.86    Ca    9.1      27 Mar 2025 06:20  Phos  5.7     03-27  Mg     1.70     03-27        Urinalysis Basic - ( 27 Mar 2025 06:20 )    Color: x / Appearance: x / SG: x / pH: x  Gluc: 101 mg/dL / Ketone: x  / Bili: x / Urobili: x   Blood: x / Protein: x / Nitrite: x   Leuk Esterase: x / RBC: x / WBC x   Sq Epi: x / Non Sq Epi: x / Bacteria: x        Venous Blood Gas:  03-27 @ 06:20  7.54/42/193/36/99.4  VBG Lactate: --

## 2025-03-28 NOTE — CHART NOTE - NSCHARTNOTEFT_GEN_A_CORE
[facility] PA DECANNULATION NOTE     Patient seen by bedside and able to tolerate  well. Per discussion patient stable for decannulation. Explained procedure to patient and patient expressed comprehension. Tracheostomy removed and pink foam dressing applied. Taught patient proper speaking and coughing techniques and patient demonstrated comprehension via teach back method. Patient tolerated procedure well. Will continue to monitor patient.     YINA Sotomayor, PA-C  Department of Medicine/ [facility]  In house [facility] [id number]  In house Medicine [facility] [id number]  Reachable via teams

## 2025-03-28 NOTE — PROGRESS NOTE ADULT - SUBJECTIVE AND OBJECTIVE BOX
[facility]-- WOUND TEAM -- FOLLOW UP NOTE  --------------------------------------------------------------------------------    subjective: Patient seen and examined at bedside.     Interval HPI/24 hour events:   Chart reviewed including labs and relevant images      Diet:      ROS: General/ SKIN/ see HPI  all other systems negative  pt unable to offer    ALLERGIES & MEDICATIONS  --------------------------------------------------------------------------------  Allergies    No Known Allergies    Intolerances          STANDING INPATIENT MEDICATIONS    acetaminophen     Tablet .. 650 milliGRAM(s) Oral every 6 hours  albuterol/ipratropium for Nebulization 3 milliLiter(s) Nebulizer every 6 hours  amLODIPine   Tablet 10 milliGRAM(s) Oral daily  apixaban 5 milliGRAM(s) Oral every 12 hours  caspofungin IVPB 70 milliGRAM(s) IV Intermittent every 24 hours  chlorhexidine 0.12% Liquid 15 milliLiter(s) Swish and Spit two times a day  chlorhexidine 2% Cloths 1 Application(s) Topical <User Schedule>  cloNIDine 0.2 milliGRAM(s) Oral every 8 hours  clotrimazole 1% Cream 1 Application(s) Topical two times a day  dextrose 50% Injectable 25 Gram(s) IV Push once  dextrose 50% Injectable 12.5 Gram(s) IV Push once  dextrose 50% Injectable 25 Gram(s) IV Push once  furosemide    Tablet 40 milliGRAM(s) Oral two times a day  gabapentin 400 milliGRAM(s) Oral every 8 hours  glucagon  Injectable 1 milliGRAM(s) IntraMuscular once  insulin lispro (ADMELOG) corrective regimen sliding scale   SubCutaneous three times a day before meals  lidocaine   4% Patch 1 Patch Transdermal daily  lidocaine   4% Patch 1 Patch Transdermal daily  melatonin 3 milliGRAM(s) Oral at bedtime  multivitamin/minerals/iron Oral Solution (CENTRUM) 15 milliLiter(s) Oral daily  pantoprazole    Tablet 40 milliGRAM(s) Oral before breakfast  polyethylene glycol 3350 17 Gram(s) Oral daily  QUEtiapine 25 milliGRAM(s) Oral at bedtime  senna 2 Tablet(s) Oral at bedtime      PRN INPATIENT MEDICATION  acetaminophen     Tablet .. 650 milliGRAM(s) Oral every 6 hours PRN  HYDROmorphone  Injectable 0.5 milliGRAM(s) IV Push every 6 hours PRN  oxyCODONE    IR 7.5 milliGRAM(s) Oral every 4 hours PRN        Vital signs:  T(C): 36.9 (03-28-25 @ 04:00), Max: 37 (03-28-25 @ 00:00)  HR: 105 (03-28-25 @ 11:42) (100 - 116)  BP: 132/82 (03-28-25 @ 04:00) (117/67 - 137/82)  RR: 18 (03-28-25 @ 04:00) (18 - 24)  SpO2: 100% (03-28-25 @ 11:42) (97% - 100%)  Wt(kg): --        03-27-25 @ 07:01  -  03-28-25 @ 07:00  --------------------------------------------------------  IN: 600 mL / OUT: 2425 mL / NET: -1825 mL        Physical exam:  General: NAD, A & O x 3, lethargic. Cachetic, temporal wasting, prominent bony prominences. Obese. Funtional quadrapalegic  HEENT: NC/NT, mucosa moist/dry. Poor dentation.  GI: Soft, NT/ND. + BS. Fecal incontinence.  : Indwelling baires catheter, condom cathete, penile pouch  Vascular: No temperature changes noted. Increased coolness from midfoot to distal toes. + (  ) DP/PT pulses. Capillary refill < 3 seconds. + Edema bilaterally. Thickened toenails. (+) hemosiderin staining.  Skin: moist, adequate skin turgor. Dry, poor skin turgor. Frail.  Psych: mood and demeanor appropriate      LABS/ CULTURES/ RADIOLOGY:              8.5    10.47 >-----------<  501      [03-28-25 @ 06:05]              25.6     138  |  93  |  6   ----------------------------<  101      [03-28-25 @ 06:05]  3.5   |  35  |  0.94        Ca     9.3     [03-28-25 @ 06:05]      Mg     1.70     [03-28-25 @ 06:05]      Phos  5.5     [03-28-25 @ 06:05]                CAPILLARY BLOOD GLUCOSE      POCT Blood Glucose.: 108 mg/dL (28 Mar 2025 11:41)  POCT Blood Glucose.: 118 mg/dL (28 Mar 2025 07:37)  POCT Blood Glucose.: 118 mg/dL (27 Mar 2025 22:08)  POCT Blood Glucose.: 107 mg/dL (27 Mar 2025 16:54)        Triglycerides, Serum: 169 mg/dL (03-08-25 @ 00:18)  Triglycerides, Serum: 187 mg/dL (03-07-25 @ 06:00)  Triglycerides, Serum: 120 mg/dL (03-06-25 @ 04:00)  Triglycerides, Serum: 155 mg/dL (03-05-25 @ 03:30)                A1C with Estimated Average Glucose Result: 6.7 % (02-25-25 @ 05:44)          Assessment/Plan:    Wound care follow up     -On exam right buttock slightly indurated over large shallow ulcer, no crepitus, no obvious erythema, no fluctuance, no purul  -Small serous drainage from right buttock   -Recommend to discontinue hydrocolloid and place Aquacel AG over base of wound, cover with silicone foam with border.       Continue low airloss support surface.  Continue to turn and position every 2 hours with z-frankie fluidized positioning device.  Continue use of Complete Cair pressure offloading boots.  Continue Nutritional management as per RD recommendations.    Upon discharge follow up at outpatient [facility]. [address] [place] [address]. [phone].    Will continue to follow while inpatient. Please reconsult eralier if needed it.   Thank you.    Disussed findings and plan with primary team.  Milly Baron, SVETLANA-BC, CWOC    pager #[phone] or available in MS teams     If after 4PM or before 7:30AM on Mon-Friday or weekend/holiday please contact general surgery for urgent matters.   Team A- [id number]   Team B- [id number]  For non-urgent matters e-mail [email or web address]    I/We spent 35/25 minutes face-to-face with this patient of which more than 50% of the time was spent counseling/coordinating care of this patient.       [facility]-- WOUND TEAM -- FOLLOW UP NOTE  --------------------------------------------------------------------------------    subjective: Patient seen and examined at bedside. Patient received in bed, patient decannulated by primary team, able to speak. Endorses he feels better overall, endorses still RLE pain and feet pain. Patient requested pain medicine for feet pain, primary RN administered pain medicine as order     Interval HPI/24 hour events:   -->Decannulated today   -->Per nursing team Compella bed broke yesterday night, patient was transfer to Bariatric bed a new compella bed was order and arriving at 1400, confirmed with Safe Patient Handling team   -->Pain medicine team consulted for unrelieved bilateral leg pain   Chart reviewed including labs and relevant images    Diet, Soft and Bite Sized:   Supplement Feeding Modality:  Oral  Ensure Max Cans or Servings Per Day:  1       Frequency:  Two Times a day (03-24-25 @ 16:03) [Active]    ROS: General/ SKIN/ see HPI  all other systems negative      ALLERGIES & MEDICATIONS  --------------------------------------------------------------------------------  Allergies    No Known Allergies    Intolerances    STANDING INPATIENT MEDICATIONS    acetaminophen     Tablet .. 650 milliGRAM(s) Oral every 6 hours  albuterol/ipratropium for Nebulization 3 milliLiter(s) Nebulizer every 6 hours  amLODIPine   Tablet 10 milliGRAM(s) Oral daily  apixaban 5 milliGRAM(s) Oral every 12 hours  caspofungin IVPB 70 milliGRAM(s) IV Intermittent every 24 hours  chlorhexidine 0.12% Liquid 15 milliLiter(s) Swish and Spit two times a day  chlorhexidine 2% Cloths 1 Application(s) Topical <User Schedule>  cloNIDine 0.2 milliGRAM(s) Oral every 8 hours  clotrimazole 1% Cream 1 Application(s) Topical two times a day  dextrose 50% Injectable 25 Gram(s) IV Push once  dextrose 50% Injectable 12.5 Gram(s) IV Push once  dextrose 50% Injectable 25 Gram(s) IV Push once  furosemide    Tablet 40 milliGRAM(s) Oral two times a day  gabapentin 400 milliGRAM(s) Oral every 8 hours  glucagon  Injectable 1 milliGRAM(s) IntraMuscular once  insulin lispro (ADMELOG) corrective regimen sliding scale   SubCutaneous three times a day before meals  lidocaine   4% Patch 1 Patch Transdermal daily  lidocaine   4% Patch 1 Patch Transdermal daily  melatonin 3 milliGRAM(s) Oral at bedtime  multivitamin/minerals/iron Oral Solution (CENTRUM) 15 milliLiter(s) Oral daily  pantoprazole    Tablet 40 milliGRAM(s) Oral before breakfast  polyethylene glycol 3350 17 Gram(s) Oral daily  QUEtiapine 25 milliGRAM(s) Oral at bedtime  senna 2 Tablet(s) Oral at bedtime    PRN INPATIENT MEDICATION  acetaminophen     Tablet .. 650 milliGRAM(s) Oral every 6 hours PRN  HYDROmorphone  Injectable 0.5 milliGRAM(s) IV Push every 6 hours PRN  oxyCODONE    IR 7.5 milliGRAM(s) Oral every 4 hours PRN    Vital signs:  T(C): 36.9 (03-28-25 @ 04:00), Max: 37 (03-28-25 @ 00:00)  HR: 105 (03-28-25 @ 11:42) (100 - 116)  BP: 132/82 (03-28-25 @ 04:00) (117/67 - 137/82)  RR: 18 (03-28-25 @ 04:00) (18 - 24)  SpO2: 100% (03-28-25 @ 11:42) (97% - 100%)    Wt(kg): --202.1 (3-23-25)    03-27-25 @ 07:01  -  03-28-25 @ 07:00  --------------------------------------------------------  IN: 600 mL / OUT: 2425 mL / NET: -1825 mL    Constitutional: NAD/Alert, AOX3. Patient assessed with primary RN, safe patient handling team at the bedside assisting with turn   Morbid Obese   (+) bariatric low airloss support surface (+) fluidized positioning devices, heels elevated with large Z fluidized positioning device, bariatric seat cushion in chair   HEENT: NC/AT, non icteric, mucosa moist, throat clear, trachea midline, neck supple  Cardiovascular: tachy   Respiratory: Decannulated, intact allevyn dressing in place, place by primary team   Gastrointestinal: soft NT/ND. obese, (+) flatus passing   Increase moisture in ABD pannus and bilateral groin, Interdry textile dressings replaced   : penile pouch in place   Neurology: follows commands, functional paraplegic  Musculoskeletal:  limited aROM to lower extremities, able to hold on to rail when turned , no deformities/ contractures  Vascular: No temperature changes, good hair distribution, no pulses palpable, biphasic doppler sounds in DP.   Multiple distal bilateral plantar feet and toes skin impairment   -Left distal plantar foot reabsorbed blister with mostly re-epithelization and one open area within re-epithelization entire area measuring 0joa5up, open ulcer measuring 1.2xcd0pnc3.1cm exposing pink moist dermis   -Left plantar toes 1,2,4  with black dry eschar (ischemic changes), measuring respectively: 0aiy0ojq2, 1cmx0.7cmx0, 8tcm1qux5, and 5th plantar toe deroof  blister with macerated skin and pink moist tissue at the base (measures 2.5cmx2.5cmx0.1cm, no purulence. Periwound skin intact   Intertriginous space in left 4th and 5th toe with maceration, no obvious wounds.   Right foot distal plantar intact blood filled blister- [id number], periwound skin intact   Right foot 4th and 5th toes with black dry eschar (ischemic changes), no drainage  -Right 4th medial toe- 1.5cmx0.7cmx0  -right 4th plantar toe- 1.5cmx1.5cmx0  -Right 5th plantar toe (almost entire toe affected)- 3.7gdc3kdd8  Skin:  moist w/ good turgor  Left flank region within intertriginous folds remains with area of hyperpigmentation and hypopigmentation   Right forearm with area of previous localized erythema and mild induration/hematoma? erythema resolved, small hematoma palpable?   Sacrum/gluteal cleft and bilateral buttocks with full thickness wound exposing mixed tissue type, gluteal cleft wound partially obscured in patients natural anatomical position. Difficult to stretch gluteal cleft open given patients body habitus   -Measurements taking separately given patient habitus difficult to measure as one   -Left buttock cluster measuring- 3cmx2.5cmx0.2cm, stable  from main wound by re-epithelization bridge, tissue type with pink granular tissue/red dermis and fibrinous tissue. Irregular borders   -Gluteal cleft to right buttock- [id number] (prev [id number]), large partial thickness wound in right buttock included in measurements. Today narrow tunnel noted in left side of distal gluteal cleft extending 3.5cm, no purulence noted, (+) friable   -Gluteal cleft exposing fibrinous yellow/slough/fibrinous tissue and gray moist tan fixed eschar, right buttock with friable pink moist dermis, fibrin film, and areas of re-epithelization. Minimal friability today only noted in right buttock today. No purulence expressed from gluteal cleft wound with deep palpation. Right buttock with moderate serous drainage noted at the bedside after wound cleansing   On exam today when patient turned to left side noted increase in induration of right buttock mostly directly over ulceration, no crepitus, no fluctuance, no obvious erythema, no localized edema, (+) tenderness on palpation    ACP Bharti BRYANT called to bedside to assess wound, discussed to continue to monitor area of induration, induration was noted in previous assessments, however today seem to be indurated over ulceration.   Psych: calm and cooperative    LABS/ CULTURES/ RADIOLOGY:              8.5    10.47 >-----------<  501      [03-28-25 @ 06:05]              25.6     138  |  93  |  6   ----------------------------<  101      [03-28-25 @ 06:05]  3.5   |  35  |  0.94        Ca     9.3     [03-28-25 @ 06:05]      Mg     1.70     [03-28-25 @ 06:05]      Phos  5.5     [03-28-25 @ 06:05]      CAPILLARY BLOOD GLUCOSE      POCT Blood Glucose.: 108 mg/dL (28 Mar 2025 11:41)  POCT Blood Glucose.: 118 mg/dL (28 Mar 2025 07:37)  POCT Blood Glucose.: 118 mg/dL (27 Mar 2025 22:08)  POCT Blood Glucose.: 107 mg/dL (27 Mar 2025 16:54)        Triglycerides, Serum: 169 mg/dL (03-08-25 @ 00:18)  Triglycerides, Serum: 187 mg/dL (03-07-25 @ 06:00)  Triglycerides, Serum: 120 mg/dL (03-06-25 @ 04:00)  Triglycerides, Serum: 155 mg/dL (03-05-25 @ 03:30)      A1C with Estimated Average Glucose Result: 6.7 % (02-25-25 @ 05:44)             [facility]-- WOUND TEAM -- FOLLOW UP NOTE  --------------------------------------------------------------------------------    subjective: Patient seen and examined at bedside. Patient received in bed, patient decannulated by primary team, able to speak. Endorses he feels better overall, endorses still RLE pain and feet pain. Patient requested pain medicine for feet pain, primary RN administered pain medicine as order. 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 650 milliGRAM(s) Oral every 6 hours  albuterol/ipratropium for Nebulization 3 milliLiter(s) Nebulizer every 6 hours  amLODIPine   Tablet 10 milliGRAM(s) Oral daily  apixaban 5 milliGRAM(s) Oral every 12 hours  caspofungin IVPB 70 milliGRAM(s) IV Intermittent every 24 hours  chlorhexidine 0.12% Liquid 15 milliLiter(s) Swish and Spit two times a day  chlorhexidine 2% Cloths 1 Application(s) Topical <User Schedule>  cloNIDine 0.2 milliGRAM(s) Oral every 8 hours  clotrimazole 1% Cream 1 Application(s) Topical two times a day  dextrose 50% Injectable 25 Gram(s) IV Push once  dextrose 50% Injectable 12.5 Gram(s) IV Push once  dextrose 50% Injectable 25 Gram(s) IV Push once  furosemide    Tablet 40 milliGRAM(s) Oral two times a day  gabapentin 400 milliGRAM(s) Oral every 8 hours  glucagon  Injectable 1 milliGRAM(s) IntraMuscular once  insulin lispro (ADMELOG) corrective regimen sliding scale   SubCutaneous three times a day before meals  lidocaine   4% Patch 1 Patch Transdermal daily  lidocaine   4% Patch 1 Patch Transdermal daily  melatonin 3 milliGRAM(s) Oral at bedtime  multivitamin/minerals/iron Oral Solution (CENTRUM) 15 milliLiter(s) Oral daily  pantoprazole    Tablet 40 milliGRAM(s) Oral before breakfast  polyethylene glycol 3350 17 Gram(s) Oral daily  QUEtiapine 25 milliGRAM(s) Oral at bedtime  senna 2 Tablet(s) Oral at bedtime    PRN INPATIENT MEDICATION  acetaminophen     Tablet .. 650 milliGRAM(s) Oral every 6 hours PRN  HYDROmorphone  Injectable 0.5 milliGRAM(s) IV Push every 6 hours PRN  oxyCODONE    IR 7.5 milliGRAM(s) Oral every 4 hours PRN    Vital signs:  T(C): 36.9 (03-28-25 @ 04:00), Max: 37 (03-28-25 @ 00:00)  HR: 105 (03-28-25 @ 11:42) (100 - 116)  BP: 132/82 (03-28-25 @ 04:00) (117/67 - 137/82)  RR: 18 (03-28-25 @ 04:00) (18 - 24)  SpO2: 100% (03-28-25 @ 11:42) (97% - 100%)    Wt(kg): --202.1 (3-23-25)    03-27-25 @ 07:01  -  03-28-25 @ 07:00  --------------------------------------------------------  IN: 600 mL / OUT: 2425 mL / NET: -1825 mL    Constitutional: NAD/Alert, AOX3. Patient assessed with primary RN, safe patient handling team at the bedside assisting with turn   Morbid Obese   (+) bariatric low airloss support surface (+) fluidized positioning devices, heels elevated with large Z fluidized positioning device, bariatric seat cushion in chair   HEENT: NC/AT, non icteric, mucosa moist, throat clear, trachea midline, neck supple  Cardiovascular: tachy   Respiratory: Decannulated, intact allevyn dressing in place, place by primary team   Gastrointestinal: soft NT/ND. obese, (+) flatus passing   Increase moisture in ABD pannus and bilateral groin, Interdry textile dressings replaced   : penile pouch in place   Neurology: follows commands, functional paraplegic  Musculoskeletal:  limited aROM to lower extremities, able to hold on to rail when turned , no deformities/ contractures  Vascular: No temperature changes, good hair distribution, no pulses palpable, biphasic doppler sounds in DP.   Multiple distal bilateral plantar feet and toes skin impairment/ischemic changes, no overt threatening ischemic limbs   -Left distal plantar foot reabsorbed blister with mostly re-epithelization and one open area within re-epithelization entire area measuring 5uki9xw, open ulcer measuring 1.2hmm1ksn3.1cm exposing pink moist dermis   -Left plantar toes 1,2,4  with black dry eschar (ischemic changes), measuring respectively: 2fbu4pxq2, 1cmx0.7cmx0, 0qvl6ijb0, and 5th plantar toe deroof  blister with macerated skin and pink moist tissue at the base (measures 2.5cmx2.5cmx0.1cm, no purulence. Periwound skin intact   Intertriginous space in left 4th and 5th toe with maceration, no obvious wounds.   Right foot distal plantar intact blood filled blister- 2zly7iyb4, periwound skin intact   Right foot 4th and 5th toes with black dry eschar (ischemic changes), no drainage  -Right 4th medial toe- 1.5cmx0.7cmx0  -right 4th plantar toe- 1.5cmx1.5cmx0  -Right 5th plantar toe (almost entire toe affected)- 3.4thy0ysx0  Skin:  moist w/ good turgor  Left flank region within intertriginous folds remains with area of hyperpigmentation and hypopigmentation   Right forearm with area of previous localized erythema and mild induration/hematoma? erythema resolved, small hematoma palpable?   Sacrum/gluteal cleft and bilateral buttocks with full thickness wound exposing mixed tissue type, gluteal cleft wound partially obscured in patients natural anatomical position. Difficult to stretch gluteal cleft open given patients body habitus   -Measurements taking separately given patient habitus difficult to measure as one   -Left buttock cluster measuring- 3cmx2.5cmx0.2cm, stable  from main wound by re-epithelization bridge, tissue type with pink granular tissue/red dermis and fibrinous tissue. Irregular borders   -Gluteal cleft to right buttock- [id number] (prev [id number]), large partial thickness wound in right buttock included in measurements. Today narrow tunnel noted in left side of distal gluteal cleft extending 3.5cm, no purulence noted, (+) friable   -Gluteal cleft exposing fibrinous yellow/slough/fibrinous tissue and gray moist tan fixed eschar, right buttock with friable pink moist dermis, fibrin film, and areas of re-epithelization. Minimal friability today only noted in right buttock today. No purulence expressed from gluteal cleft wound with deep palpation. Right buttock with moderate serous drainage noted at the bedside after wound cleansing   On exam today when patient turned to left side noted increase in induration of right buttock mostly directly over ulceration, no crepitus, no fluctuance, no obvious erythema, no localized edema, (+) tenderness on palpation    ACP Bharti BRYANT called to bedside to assess wound, discussed to continue to monitor area of induration, induration in right buttock was noted in previous assessments, however today seem to be indurated over ulceration.   Psych: calm and cooperative    LABS/ CULTURES/ RADIOLOGY:              8.5    10.47 >-----------<  501      [03-28-25 @ 06:05]              25.6     138  |  93  |  6   ----------------------------<  101      [03-28-25 @ 06:05]  3.5   |  35  |  0.94        Ca     9.3     [03-28-25 @ 06:05]      Mg     1.70     [03-28-25 @ 06:05]      Phos  5.5     [03-28-25 @ 06:05]      CAPILLARY BLOOD GLUCOSE      POCT Blood Glucose.: 108 mg/dL (28 Mar 2025 11:41)  POCT Blood Glucose.: 118 mg/dL (28 Mar 2025 07:37)  POCT Blood Glucose.: 118 mg/dL (27 Mar 2025 22:08)  POCT Blood Glucose.: 107 mg/dL (27 Mar 2025 16:54)        Triglycerides, Serum: 169 mg/dL (03-08-25 @ 00:18)  Triglycerides, Serum: 187 mg/dL (03-07-25 @ 06:00)  Triglycerides, Serum: 120 mg/dL (03-06-25 @ 04:00)  Triglycerides, Serum: 155 mg/dL (03-05-25 @ 03:30)      A1C with Estimated Average Glucose Result: 6.7 % (02-25-25 @ 05:44)        ACC: [id number] EXAM:  CT ABDOMEN AND PELVIS IC   ORDERED BY: WILLA GAYTAN     ACC: [id number] EXAM:  CT CHEST IC   ORDERED BY: WILLA GAYTAN     PROCEDURE DATE:  03/17/2025          INTERPRETATION:  CLINICAL INFORMATION: Yeast in the blood    COMPARISON: 2/24/2025    CONTRAST/COMPLICATIONS:  IV Contrast: Omnipaque 350  90 cc administered   10 cc discarded  Oral Contrast: NONE  .    PROCEDURE:  CT of the Chest, Abdomen and Pelvis was performed.  Sagittal and coronal reformats were performed.    FINDINGS:    Evaluation of solid organs is suboptimal. The flanks are contacting the   gantry resulting in increased artifact.    CHEST:  LUNGS AND LARGE AIRWAYS: Tracheostomy in place. Patent central airways.   Bibasilar patchy opacities.  PLEURA: Small bilateral pleural effusions.  VESSELS: Within normal limits  HEART: Mild cardiomegaly. No pericardial effusion.  MEDIASTINUM AND KENDRA: No lymphadenopathy.  CHEST WALL AND LOWER NECK: Within normal limits.    ABDOMEN AND PELVIS:  LIVER: Hepatomegaly. Apparent hepatic steatosis.  BILE DUCTS: Normal caliber.  GALLBLADDER: Unremarkable at CT.  SPLEEN: Within normal limits.  PANCREAS: Within normal limits.  ADRENALS: Within normal limits.  KIDNEYS/URETERS: Within normal limits.    BLADDER: Partially distended.  REPRODUCTIVE ORGANS: Prostate within normal limits. Scrotal edema    BOWEL: Percutaneous gastrostomy in place. Small direct esophageal hiatal   hernia. No bowel obstruction. Appendix not visualized.  PERITONEUM/RETROPERITONEUM: Resolved perihepatic ascites. No evidence of   pneumoperitoneum.  VESSELS: Suboptimal opacification of the vasculature  LYMPH NODES: No lymphadenopathy.  ABDOMINAL WALL: Partially included skin thickening of the abdominal   pannus. Skin thickening of the medial thighs. Markedly decreased   subcutaneous edema.  BONES: Mild degenerative changes.      IMPRESSION:      Suboptimal exam secondary to patient's body habitus..    Patchy bilateral lower lobe alveolar opacities may reflect pneumonia as   well as atelectasis.    No gross collection is seen.    Hepatomegaly and resolved ascites.    Decreased abdominal pannus edema    Persistent skin thickening may reflect cellulitis/panniculitis.    --- End of Report ---            AMERICA RECINOS MD; Attending Radiologist  This document has been electronically signed. Mar 17 2025  5:38PM

## 2025-03-28 NOTE — CONSULT NOTE ADULT - ASSESSMENT
39M presents with R foot plantar forefoot hematoma  - Patient seen and evaluated  - Afebrile, No Leukocytosis   - L foot plantar lateral forefoot and plantar 4th distal phalanx wound to subQ, no drainage, no acute signs of inection. L foot ischemic changes to fifth digit, no malodor, no pus, no erythema, no acute signs of infection. R foot plantar medial forefoot dry hematoma with overlaying serous bullae with serous drainage, ischemic changes to fifth digit, no malodor, no pus, no erythema, no acute signs of infection  - Lanced R foot serous blister with sterile 15 blade, serous drainage. dried hematoma evacuated. Patient tolerated well.   - No culture secondary to no acute signs of infection   - No acute pod intervention, local wound care only   - Wound care instruction and follow up information in discharge note provider   - Discussed with attending    39M presents with R foot plantar forefoot hematoma  - Patient seen and evaluated  - Afebrile, No Leukocytosis   - L foot plantar lateral forefoot and plantar 4th distal phalanx wound to subQ, no drainage, no acute signs of inection. L foot ischemic changes to fifth digit, no malodor, no pus, no erythema, no acute signs of infection. R foot plantar medial forefoot dry hematoma with overlaying serous bullae with serous drainage, ischemic changes to fifth digit, no malodor, no pus, no erythema, no acute signs of infection  - Lanced R foot serous blister with sterile 15 blade, serous drainage, dried hematoma evacuated, wound to subQ, no acute signs of infection. Patient tolerated well.   - No culture secondary to no acute signs of infection   - No acute pod intervention, local wound care only   - Wound care orders placed  - Will follow in house  - Wound care instruction and follow up information in discharge note provider   - Discussed with attending    39M presents with R foot plantar forefoot hematoma  - Patient seen and evaluated  - Afebrile, No Leukocytosis   - L foot plantar lateral forefoot and plantar 4th distal phalanx wound to subQ, no drainage, no acute signs of inection. L foot ischemic changes to fifth digit, no malodor, no pus, no erythema, no acute signs of infection. R foot plantar medial forefoot dry hematoma with overlaying serous bullae with serous drainage, ischemic changes to fifth digit, no malodor, no pus, no erythema, no acute signs of infection  - Lanced R foot serous blister with sterile 15 blade, serous drainage, dried hematoma evacuated, wound to subQ, no acute signs of infection. Patient tolerated well.   - No culture secondary to no acute signs of infection   - No acute pod intervention, local wound care only   - Wound care orders placed  - Will follow in house to monitor b/l digits  - Wound care instruction and follow up information in discharge note provider   - Discussed with attending    39M presents with R foot plantar forefoot hematoma  - Patient seen and evaluated  - Afebrile, No Leukocytosis   - L foot plantar lateral forefoot and plantar 4th distal phalanx wound to subQ, no drainage, no acute signs of inection. L foot ischemic changes to fifth digit, no malodor, no pus, no erythema, no acute signs of infection. R foot plantar medial forefoot dry hematoma with overlaying serous bullae with serous drainage, ischemic changes to fifth digit, no malodor, no pus, no erythema, no acute signs of infection  - Lanced R foot serous blister with sterile 15 blade, serous drainage, dried hematoma evacuated, wound to subQ, no acute signs of infection. Patient tolerated well.   - No culture secondary to no acute signs of infection   - Wound care orders placed  - Will follow in house to monitor b/l digits  - Pod plan: Local wound care pending appearance  - Will monitor b/l digits for further ischemic changes and plan for possible podiatric surgical intervention pending appearance  - Please document medical clearance for podiatric surgery under anesthesia  - Discussed with attending

## 2025-03-28 NOTE — PROGRESS NOTE ADULT - ASSESSMENT
This is a 40 y/o M w/ PMHx AF (not on AC), HTN, BEA, pseudotumor cerebri s/p LP in 2024, initially presented to [facility] on 2/24, SOB, LE edema with URI symptoms and sick contacts, noted to have severe ARDS, intubated however still hypoxia, cannulated for VV ECMO on 2/25, transferred to [facility] on 2/25, started on empiric Vanco, Zosyn for multifocal PNA, abdominal wall cellulitis, s/p trach placement by CT surgery on 3/7, ECMO decannulated on 3/7. Zosyn held on 3/9.  C/b fevers on 3/10, restarted on Zosyn, transferred to [facility] on 3/13, Bcx now w/ yeast.    #Candida albicans fungemia   #Fevers   #Multifocal PNA, abdominal wall cellulitis s/p Vanco/Zosyn  #ARDS, hypoxic respiratory failure requiring VV EMCO 2/2 multifocal PNA? s/p decannulation on 3/7    Overall, 40 y/o M w/ PMHx AF (not on AC), HTN, BEA, pseudotumor cerebri s/p LP in 2024 admitted to [facility] on 2/26 for ARDS, hypoxic respiratory failure requiring VV EMCO 2/2 multifocal PNA? s/p decannulation on 3/7, c/b abdominal wall cellulitis s/p Vancomycin/Zosyn, s/p trach on 3/7, in the setting of persistent fevers despite Zosyn, BCx now w/ yeast, Candida albicans   Unclear source for yeast in BCx, pt had all central lines removed on 3/7, not getting TPN, no abdominal pain on exam, PEG site well appearing.     CT A/P w/o clear abdominal source of fungemia, possibly 2/2 abdominal wall cellulitis? TTE w/o IE.  BCx 3/18 1/2 positive, 3/20 NGTD x 2.     Recommendations;   1. Caspofungin 70 mg daily (given weight) (would avoid fluconazole for now given high dose needed). Would plan for 2 week course after negative BCx  (if 3/20 remains NG, then 4/2/25)  2. Repeat BCx on 3/20 NG    Thank you for consulting us and involving us in the management of this patient's case. In addition to reviewing history, imaging, documents, labs, microbiology, and infection control strategies and potential issues.     ID will continue to follow    Shailesh Owens M.D.  Attending Physician  Division of Infectious Diseases  Department of Medicine    Please contact through MS Teams message.  Office: [phone] (after 5 PM or weekend)

## 2025-03-28 NOTE — PROGRESS NOTE ADULT - ASSESSMENT
Assessment/Plan:    Wound care follow up     -On exam right buttock slightly indurated over large shallow ulcer, no crepitus, no obvious erythema, no fluctuance, no purul  -Small serous drainage from right buttock   -WBC wnl, afebrile   -Consider repeat CT of the ABD and pelvis to evaluate deeper tissue involvement, r/o deep abscess?   -Recommend to discontinue hydrocolloid and place Aquacel AG over base of wound, cover with silicone foam with border.     FULL RECS TO FOLLOW, RECS UPDATED IN SUNRISE   Continue low airloss support surface.  Continue to turn and position every 2 hours with z-frankie fluidized positioning device.  Continue use of Complete Cair pressure offloading boots.  Continue Nutritional management as per RD recommendations.    Upon discharge follow up at outpatient [facility]. [address] [place] [address]. [phone].    Remainder of Will continue to follow while inpatient. Please reconsult eralier if needed   Thank you.    Discussed findings and plan with primary team.  Milly Baron, SVETLANA-BC, CWOC    pager #[phone] or available in MS teams     If after 4PM or before 7:30AM on Mon-Friday or weekend/holiday please contact general surgery for urgent matters.   Team A- [id number]   Team B- [id number]  For non-urgent matters e-mail [email or web address]    I spent 50 minutes face-to-face with this patient of which more than 50% of the time was spent counseling/coordinating care of this patient.   Assessment/Plan:    Wound care follow up     -On exam right buttock slightly indurated over large shallow ulcer, no crepitus, no obvious erythema, no fluctuance, no purul  -Small serous drainage from right buttock   -WBC wnl, afebrile   -Consider repeat CT of the ABD and pelvis to evaluate deeper tissue involvement, r/o deep abscess?   -Recommend to discontinue hydrocolloid and place Aquacel AG over base of wound, cover with silicone foam with border.     Bilateral foot/toes wounds   -Recommend podiatry re-consult, left 5th toe ulcerated, areas of eschar, right plantar foot blood filled blister. Patient endorses bilateral foot pain     FULL RECS TO FOLLOW, RECS UPDATED IN SUNRISE   Continue low airloss support surface.  Continue to turn and position every 2 hours with z-frankie fluidized positioning device.  Continue use of Complete Cair pressure offloading boots.  Continue Nutritional management as per RD recommendations.    Upon discharge follow up at outpatient [facility]. [address] [place] [address]. [phone].    Remainder of Will continue to follow while inpatient. Please reconsult eralier if needed   Thank you.    Discussed findings and plan with primary team.  Milly Baron, SVETLANA-BC, CWOC    pager #[phone] or available in MS teams     If after 4PM or before 7:30AM on Mon-Friday or weekend/holiday please contact general surgery for urgent matters.   Team A- [id number]   Team B- [id number]  For non-urgent matters e-mail [email or web address]    I spent 50 minutes face-to-face with this patient of which more than 50% of the time was spent counseling/coordinating care of this patient.   Assessment/Plan: This is a 38 y/o M w/ PMHx AF (not on AC), HTN, BEA, pseudotumor cerebri s/p LP in 2024, initially presented to [place] on 2/24, SOB, LE edema with URI symptoms and sick contacts, noted to have severe ARDS, intubated however still hypoxia, cannulated for VV ECMO on 2/25, [facility], started on empiric Vanco, Zosyn for multifocal PNA, abdominal wall cellulitis, s/p trach placement by CT surgery on 3/7, ECMO decannulated on 3/7. Zosyn held on 3/9. C/b fevers on 3/10, restarted on Zosyn, transferred to [facility] on 3/13, Bcx now w/ yeast. ID following.     Wound care follow up for full thickness wound in gluteal cleft/sacrum and bilateral buttocks     -Sacralgluteal cleft extending to b/l buttock evolved deep tissue pressure injury complicated by moisture (sporadic fevers), incontinence and friction, tissue type with  fibrinous slough and moist gray/tan eschar, necrotic tissue over gluteal cleft area. Right buttock with pink friable dermis and fibrin film, friable with wound cleansing. Left buttock with pink dermis and fibrinous tissue   -Overall tissue type evolving since hospital admission likely due to mixed etiologies moisture, friction, critical illness (mechanical ventilation > 72 hours, sepsis), and pressure   -No s/s of acute infection in todays assessment, patient afebrile, WBC wnl   -previous friability improved, today minimal friable. HH improving   -Per records, patient Microcytic and hemochromic, sent anemia panel 3/19,, today H H 7.7/23.1 (at baseline)  - moist adherent slough and eschar at base of gluteal cleft, no crepitus, no fluctuance, no purulence express   -On exam right buttock slightly indurated over large shallow ulcer extending minimally beyond border of ulcer, no crepitus, no obvious erythema, no fluctuance, no purulence, no crepitus   -Narrow depth in distal left side of gluteal cleft, no purulence   -Small serous drainage from right buttock   -WBC wnl, afebrile   -Consider repeat CT of the ABD and pelvis to evaluate deeper tissue involvement, r/o deep abscess?   -Will add to clean with antiseptic Dakins 1/4 strength, evidence of increase of local bioburden   -Recommend to discontinue hydrocolloid and place Aquacel AG over base of wound in gluteal cleft to right buttock, cover with silicone foam with border  -Recommend to apply TRIAD hydrophilic dressing to left buttock   -Continue to offload with large Z fluidized positioning devices   -Use single breathable pad for moisture control   -Continue to collaborate with Safe Patient Handling when transfer patient etc     Topical recommendations   - s/p decannulation, management per primary team   - PEG with Peritubular Skin: Cleanse q shift with NS, apply liquid barrier film beneath matt disc.  If redness noted under matt disc bumper apply thin foam  dressing without border (Mepilex Lite)- cut to mid dressing with a 'Y' shape. Secondary securement to abdomen taping in 'H' fashion with Steri-strips.  Wound Care   .Bilateral abdominal pannus, bilateral groin: After cleansing, apply Interdry textile sheeting, under intertriginous folds leaving 2 inches exposed at ends to wick, remove to wash & dry affected area, then replace. Individual sheeting may be used for up to 5 days unless soiled. Inspect skin daily.   .Gluteal cleft to right buttock: Clean wounds with Dakins solution 1/4 strength,  rinse  wound and periwound skin with NS (place NS in clear peribottle and irrigate wound). Pat dry. Apply liquid barrier film to intact periwound skin, allow to dry. Pack depth with Aquacel AG hydrofiber abd place remaining over base of wound, cover with silicone foam with border.  Change twice a day or prn if soiled   .Once dressings place and secure, clean left buttock shallow wound with skin cleanser, pat dry. Apply TRIAD barrier paste/hydrophilic dressing, apply twice a day or prn if soiled   .Continue use of single breathable pad for moisture control  .Appreciate Safe Patient handling assistance and follow up    .Recommend continue use of large Z fluidized positioning device to elevate heels (Ochoa Lock)   .Continue turning and positioning w/ offloading assistive devices as per protocol  .Continue w/ Pericare as per protocol  .Continue use of Bariatric Waffle Cushion to chair if oob to chair, limit seating time to less than 2 consecutive hours   Continue w/ bariatric low air loss fluidized bed surface   .Continue Nutritional management as per RD recommendations.    Bilateral foot/toes wounds   -Recommend podiatry re-consult, left 5th toe ulcerated, areas of eschar, right plantar foot blood filled blister. Patient endorses bilateral foot pain  -Appreciate Podiatry consult today (see consult note 3/28)  -Topical recs per podiatry     Upon discharge follow up at outpatient [facility]. [address] [place] [address]. [phone].    Remainder of care per primary team, Will continue to follow while inpatient. Please reconsult earlier if needed   Thank you.    Discussed findings and plan with primary team ACP Bharti HIGH, and primary RN   Discussed all findings and plan with patient, patient verbalized understanding, all questions answer to satisfaction   Milly Baron, SVETLANA-BC, CN     pager #[phone] or available in MS teams     If after 4PM or before 7:30AM on Mon-Friday or weekend/holiday please contact general surgery for urgent matters.   Team A- [id number]   Team B- [id number]  For non-urgent matters e-mail [email or web address]    I spent 50 minutes face-to-face with this patient of which more than 50% of the time was spent counseling/coordinating care of this patient.

## 2025-03-28 NOTE — PROGRESS NOTE ADULT - SUBJECTIVE AND OBJECTIVE BOX
Infectious Diseases Follow Up:    Patient is a 39y old  Male who presents with a chief complaint of sob (02 Mar 2025 06:19)      Interval History/ROS:  No acute events noted, pt is doing well     Allergies  No Known Allergies        ANTIMICROBIALS:  caspofungin IVPB 70 every 24 hours      Current Abx:     Previous Abx     OTHER MEDS:  MEDICATIONS  (STANDING):  acetaminophen     Tablet .. 650 every 6 hours PRN  acetaminophen     Tablet .. 650 every 6 hours  albuterol/ipratropium for Nebulization 3 every 6 hours  amLODIPine   Tablet 10 daily  apixaban 5 every 12 hours  cloNIDine 0.2 every 8 hours  dextrose 50% Injectable 25 once  dextrose 50% Injectable 12.5 once  dextrose 50% Injectable 25 once  furosemide    Tablet 40 two times a day  gabapentin 400 every 8 hours  glucagon  Injectable 1 once  HYDROmorphone  Injectable 0.5 every 6 hours PRN  insulin lispro (ADMELOG) corrective regimen sliding scale  three times a day before meals  melatonin 3 at bedtime  oxyCODONE    IR 7.5 every 4 hours PRN  pantoprazole    Tablet 40 before breakfast  polyethylene glycol 3350 17 daily  QUEtiapine 25 at bedtime  senna 2 at bedtime      Vital Signs Last 24 Hrs  T(C): 36.9 (28 Mar 2025 04:00), Max: 37 (28 Mar 2025 00:00)  T(F): 98.5 (28 Mar 2025 04:00), Max: 98.6 (28 Mar 2025 00:00)  HR: 103 (28 Mar 2025 09:01) (100 - 116)  BP: 132/82 (28 Mar 2025 04:00) (117/67 - 137/82)  BP(mean): 97 (28 Mar 2025 04:00) (79 - 97)  RR: 18 (28 Mar 2025 04:00) (18 - 24)  SpO2: 100% (28 Mar 2025 09:01) (97% - 100%)    Parameters below as of 28 Mar 2025 09:01  Patient On (Oxygen Delivery Method): nasal cannula          PHYSICAL EXAM:  GENERAL: NAD  HEAD:  Atraumatic, Normocephalic  EYES: EOMI, conjunctiva and sclera clear  NECK: +trach with speaking valve capped,    CHEST/LUNG: On NC, CTAB  HEART: RRR  ABDOMEN: Soft, Nontender, Nondistended;  PSYCH: AAOx3                            8.5    10.47 )-----------( 501      ( 28 Mar 2025 06:05 )             25.6       03-28    138  |  93[L]  |  6[L]  ----------------------------<  101[H]  3.5   |  35[H]  |  0.94    Ca    9.3      28 Mar 2025 06:05  Phos  5.5     03-28  Mg     1.70     03-28        Urinalysis Basic - ( 28 Mar 2025 06:05 )    Color: x / Appearance: x / SG: x / pH: x  Gluc: 101 mg/dL / Ketone: x  / Bili: x / Urobili: x   Blood: x / Protein: x / Nitrite: x   Leuk Esterase: x / RBC: x / WBC x   Sq Epi: x / Non Sq Epi: x / Bacteria: x        MICROBIOLOGY:  v  Trach Asp Tracheal Aspirate  03-21-25   Commensal kyle consistent with body site  --    Few polymorphonuclear leukocytes per low power field  No Squamous epithelial cells per low power field  No organisms seen per oil power field      Blood Blood-Peripheral  03-20-25   No growth at 5 days  --  --      Blood Blood-Venous  03-20-25   No growth at 5 days  --  --      Blood Blood-Peripheral  03-18-25   No growth at 5 days  --  Blood Culture PCR  Candida albicans      Nares/Axilla/Groin Nares/Axilla/Groin  03-17-25   Culture NEGATIVE for Candida auris.  This surveillance culture is intended for Infection Control purposes only.  A negative result does not preclude the carriage of other fungal  organisms.  --  --      Blood Blood-Venous  03-16-25   Growth in aerobic bottle: Candida albicans  See previous culture [id number]  --    Growth in aerobic bottle: Yeast like cells      Blood Blood-Peripheral  03-16-25   Growth in aerobic bottle: Candida albicans  See previous culture [id number]  --    Growth in aerobic bottle: Yeast like cells      Trach Asp Tracheal Aspirate  03-16-25   Commensal kyle consistent with body site  --    Few polymorphonuclear leukocytes per low power field  Rare Squamous epithelial cells per low power field  Rare Gram Positive Rods seen per oil power field  Rare Gram Negative Rods seen per oil power field      Blood Blood-Venous  03-15-25   Growth in aerobic bottle: Candida albicans  See previous culture [id number]  --    Growth in aerobic bottle: Yeast like cells      Blood Blood-Peripheral  03-15-25   Growth in aerobic bottle: Candida albicans  Direct identification is available within approximately 3-5  hours either by Blood Panel Multiplexed PCR or Direct  MALDI-TOF. Details: [email or web address]  --  Blood Culture PCR  Candida albicans      Catheterized Catheterized  03-12-25   <10,000 CFU/mL Normal Urogenital Kyle  --  --      Blood Blood-Peripheral  03-11-25   No growth at 5 days  --  --      Blood Blood-Peripheral  03-11-25   No growth at 5 days  --  --      Trach Asp Tracheal Aspirate  03-10-25   Commensal kyle consistent with body site  --    Few polymorphonuclear leukocytes per low power field  No Squamous epithelial cells per low power field  No organisms seen per oil power field      Bronchial Bronchial Lavage  03-05-25   No growth  --    Rare polymorphonuclear leukocytes per low power field  No squamous epithelial cells per low power field  No organisms seen per oil power field      Bronchial Bronchial Lavage  03-01-25   No growth to date  --    Few polymorphonuclear leukocytes per low power field  No Squamous epithelial cells per low power field  No organisms seen per oil power field                RADIOLOGY:

## 2025-03-28 NOTE — CONSULT NOTE ADULT - SUBJECTIVE AND OBJECTIVE BOX
Patient is a 39y old  Male who presents with a chief complaint of sob (02 Mar 2025 06:19)      HPI:  38 yo M w/ class III obesity, pAfib (no AC), HTN, BEA (on CPAP), history of b/l papilledema attributed to pseudotumor cerebri s/p LP in 2024 with very high opening pressure, who is transferred for VV ECMO for ARDS and severe hypoxia. Patient initially presented to [facility] on 2/24 for SOB and b/l LE edema. 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 He is a current every day smoker of [place] and marijuana, but does not drink or do other drugs. Currently lives with his mother. There is a long term partner in his life, but they are not , and they have an on/off relationship currently not very involved with each other as per the patient's mother and aunt.  At [facility] where he was getting an eval last year for shortness of breath, he had a sleep study and was diagnosed with sleep apnea, prescribed a PAP machine, which he has in his room but the mother is not sure how many nights per week he uses it. Recently, the last day or two, his mother and brother have been under the weather with URIs and the patient 2 days ago started to develop a ruynny nose, ough, some wheezing of the chest, as well as worsening shortness of breath and fevers at home. He called his aunt who told him to go get checked out and then he landed at the [facility] ED. Patient found to be reportedly hypoxemic to 70% on RA with worsening respiratory status/secretions and somnolence in the ED. Patient was intubated with difficulty (7 attempts) due to very large tongue secretions. Despite optimal vent management, patient remained hypoxemic. Unable to prone due to obesity. Patient cannulated for VV ECMO on 2/25 and transferred to [facility] for further management.     [facility] labs notable for MRSA PCR -, Fluvid -, urine legionella -, sputum gram stain  with GPC and GPR    CTA chest on 2/24 negative for pulmonary embolism, notable for patchy bilateral lower lobe opacities, hepatomegaly, mild ascites, edema/induration of the abdominal pannus.    LE duplex on 2/25 negative for DVT    TTE on 2/25 showed LV EF 61%, enlarged RV with TAPSE 2.1cm, ePASP at least 49 (Patient had 10/2024 TTE with normal LV and RV with ePASP 14).    Only home med is Lasix. Not on acetazolamide for pseudotumor or on Wegovy (was pending auth) (26 Feb 2025 01:48)      PAST MEDICAL & SURGICAL HISTORY:  HTN (hypertension)      Atrial fibrillation  paroxysmal      Papilledema of both eyes      Chronic sinusitis      Morbid obesity      No significant past surgical history          MEDICATIONS  (STANDING):  acetaminophen     Tablet .. 650 milliGRAM(s) Oral every 6 hours  albuterol/ipratropium for Nebulization 3 milliLiter(s) Nebulizer every 6 hours  amLODIPine   Tablet 10 milliGRAM(s) Oral daily  apixaban 5 milliGRAM(s) Oral every 12 hours  caspofungin IVPB 70 milliGRAM(s) IV Intermittent every 24 hours  chlorhexidine 0.12% Liquid 15 milliLiter(s) Swish and Spit two times a day  chlorhexidine 2% Cloths 1 Application(s) Topical <User Schedule>  cloNIDine 0.2 milliGRAM(s) Oral every 8 hours  clotrimazole 1% Cream 1 Application(s) Topical two times a day  Dakins Solution - 1/4 Strength 1 Application(s) Topical daily  dextrose 50% Injectable 25 Gram(s) IV Push once  dextrose 50% Injectable 12.5 Gram(s) IV Push once  dextrose 50% Injectable 25 Gram(s) IV Push once  furosemide    Tablet 40 milliGRAM(s) Oral two times a day  gabapentin 400 milliGRAM(s) Oral every 8 hours  glucagon  Injectable 1 milliGRAM(s) IntraMuscular once  insulin lispro (ADMELOG) corrective regimen sliding scale   SubCutaneous three times a day before meals  lidocaine   4% Patch 1 Patch Transdermal daily  lidocaine   4% Patch 1 Patch Transdermal daily  melatonin 3 milliGRAM(s) Oral at bedtime  multivitamin/minerals/iron Oral Solution (CENTRUM) 15 milliLiter(s) Oral daily  pantoprazole    Tablet 40 milliGRAM(s) Oral before breakfast  polyethylene glycol 3350 17 Gram(s) Oral daily  QUEtiapine 25 milliGRAM(s) Oral at bedtime  senna 2 Tablet(s) Oral at bedtime    MEDICATIONS  (PRN):  acetaminophen     Tablet .. 650 milliGRAM(s) Oral every 6 hours PRN Temp greater or equal to 38C (100.4F), Mild Pain (1 - 3), Moderate Pain (4 - 6)  HYDROmorphone  Injectable 0.5 milliGRAM(s) IV Push every 6 hours PRN MOd-Severe Pain (4 - 10)  oxyCODONE    IR 7.5 milliGRAM(s) Oral every 4 hours PRN MOD/SEVERE PAIN 4-10      Allergies    No Known Allergies    Intolerances        VITALS:    Vital Signs Last 24 Hrs  T(C): 36.9 (28 Mar 2025 04:00), Max: 37 (28 Mar 2025 00:00)  T(F): 98.5 (28 Mar 2025 04:00), Max: 98.6 (28 Mar 2025 00:00)  HR: 103 (28 Mar 2025 15:32) (100 - 116)  BP: 132/82 (28 Mar 2025 04:00) (128/80 - 137/82)  BP(mean): 97 (28 Mar 2025 04:00) (94 - 97)  RR: 18 (28 Mar 2025 04:00) (18 - 22)  SpO2: 100% (28 Mar 2025 15:32) (97% - 100%)    Parameters below as of 28 Mar 2025 09:01  Patient On (Oxygen Delivery Method): nasal cannula        LABS:                          8.5    10.47 )-----------( 501      ( 28 Mar 2025 06:05 )             25.6       03-28    138  |  93[L]  |  6[L]  ----------------------------<  101[H]  3.5   |  35[H]  |  0.94    Ca    9.3      28 Mar 2025 06:05  Phos  5.5     03-28  Mg     1.70     03-28        CAPILLARY BLOOD GLUCOSE      POCT Blood Glucose.: 108 mg/dL (28 Mar 2025 11:41)  POCT Blood Glucose.: 118 mg/dL (28 Mar 2025 07:37)  POCT Blood Glucose.: 118 mg/dL (27 Mar 2025 22:08)  POCT Blood Glucose.: 107 mg/dL (27 Mar 2025 16:54)      Lower Extremity Physical Exam:    Vascular: DP 1/4 B/L, PT 0/4, B/L secondary to +3 pitting edema, CFT <3 seconds B/L, Temperature gradient warm to cool, B/L.   Neuro: sensation dim to level of digits   Musculoskeletal/Ortho: unremarkable   Skin: L foot plantar lateral forefoot and plantar 4th distal phalanx wound to subQ, no drainage, no acute signs of inection. L foot ischemic changes to fifth digit, no malodor, no pus, no erythema, no acute signs of infection. R foot plantar medial forefoot dry hematoma with overlaying serous bullae with serous drainage, ischemic changes to fifth digit, no malodor, no pus, no erythema, no acute signs of infection      RADIOLOGY & ADDITIONAL STUDIES:

## 2025-03-28 NOTE — PROGRESS NOTE ADULT - ASSESSMENT
38 YO M with PMHx of morbid obesity, BEA on CPAP, pAFIB (not on AC), HTN, and BL papilledema attributed to pseudotumor cerebri who presented initially to [facility] on 2/24 with SOB and BL LE edema. Found with URI outpatient with progressive SOB, and concern for noted for MFPNA on imaging. Course progressed with AHRF with worsening O2 demand, respiratory distress, secretions, and somnolence requiring intubation (difficult with success after 6 attempts) in ED and admission to [facility] MICU. While in MICU, patient noted with increasing O2 demand with no improvement despite optimal vent management. Concern noted for progressive ARDS, unable to prone given morbid obesity, and ultimately cannulated for vvECMO on 2/25 and transferred to [facility] for further management on 2/26. While at [facility], tx with diuresis and ABX, and ultimately decannulated and s/p 60XLTCP trach and PEG on 3/7. Patient ultimately transferred to RCU on 3/11 and course complicated by recurrent high grade fevers and found with fungemia.    NEUROLOGY  # Hx of Pseudotumor Cerebri   - s/p LP in 2024 with high opening pressure  - s/p MRI 2024 with possible pituitary mass but unclear because of pressure effect from pseudotumor cerebri causing partially empty sella.  - Prolactin elevated   - ACTH low but recieved steroids during admission   - FT4 low normal and TSH normal, but given FT4 low normal TSH should be higher   - Discuss endocrine consult for inhouse work up vs outpatient.     # Sedation   - Weaned off sedation in ICU   - Nimbex turned off 2/27   - Prop turned off 3/9   - Precedex turned off and catapres started 3/11  - Continue on catapres 0.2 TID (decreased 3/25) - taper    # Insomnia   - Patient worked night shift x 15 years   - Trouble sleeping at night leading to day time sleepiness and poor participation with PT  - Continue on Seroquel 25 QHS (increased 3/18)   - Continue on melatonin   - Continue on neurontin as below    # BL LE neuropathy   - CIPN concern   - Neurontin increased to 300mg Q8H with relief   - added Percocet Q4H for moderate pain (3/25) and continue on Dilaudid Q6H for severe pain   - Pain mgt called to consult - with recs for tylenol ATC, NEurontin increased to 400mg q6, Oxycodone 5 q4h prn , Dilaudid for BREAKTHROUGH pain only, Lidocaine patches on each leg, ice prn to feet  - Some improvement in pain - PT consult for TENS,     CARDIOVASCULAR  # HTN   - HTN noted in ICU requiring cardene and esmolol GTTs   - Lisinopril dc'ed to increase catapres   - Continue on norvasc 10 and catapres 0.3 TID   - Monitor BP     # AFIB   - Hx of pAFIB   - No RVR episodes or pAFIB episodes in ICU   - No rate control medications needed  - Monitor on telemetry     # RV dilation second to PHTN   - POCUS on arrival to [facility] with RVE concern   - TTE 10/2024 with EF 55-60 with normal LVSF, moderate LVH and normal RVSF and size with no concern for pHTN   - TTE on 2/25 at  with EF 61 and normal LVSF, but enlarged RV size with reduced RVSF, mild TR, mild PHTN with PASP 49, and dilated IVC to 3.4cn, but normal TAPSE 2.1.  - Continue on aggressive diuresis in ICU    - TTE 3/11 with RVSF reduced with TAPSE 1.6. IVC improved to 2.12cm.   - Continue on lasix 40 PO BID   - Monitor IO    RESPIRATORY  # ARDS second to MFPNA   - Hx of BEA on CPAP presented to  post URI with SOB and found with AHRF with progression to ARDS second to post viral MFPNA   - Intubated 2/25   - Cannulated for vvECMO 2/26   - s/p 60XLTCP tracheostomy 3/7   - Decannulated 3/7   - CTSx removed tracheostomy and ECMO sutures on 3/17   - Continue on TC QD with PMV as able with strong phonation  - Continue on PS 18/10/40 QHS given OHS   - Continue on nebs and HTS   -  trials continue and now trialing overnight with BIPAP for OHS (10/8/12)  - trend VBG QD    GI  # Dysphagia   - s/p PEG 3/7   - Attempted green dye on 3/13 and failed  - Continue on TF  - Continue on miralax and senna  - RPT green dye passed 3/19  - 3/20 Passed FEEST - on soft bite sized with thin liquids     # Mild transaminitis   - LFTs elevated in ICU with TBILI elevated likely from ECMO hemolysis   - US ABD with hepatic steatosis   - Trend LFTs      RENAL  # ELLA   - ELLA likely from cardiorenal with RVE   - Diuresed in ICU and improved   - Monitor renal function and UOP     # Hypernatremia   - Hypernatremia with fever spikes   - Fever curve improved and attempting to wean FWF   - Continue on  Q6H (decreased 3/19)    INFECTIOUS DISEASE  # Recurrent fevers with fungemia from unknown source   - Recurrent fevers post ECMO decannulation thought initially to be cytokine surge   - BCx, SCx, UCx, RVP and MRSA RPT on 3/12 negative   - Completed zosyn (3/12-3/18) empirically with improved WBC , however recurrent fevers noted.   - RPT SCx, UA, CXR and RVP negative  - TTE 3/17 with no IE  - PanCT 3/17 with PNA and persistent panniculitis   - BCx 3/15 and 3/16 with candida albicans.   - Concern for possible source from panniculitis   - Continue on caspo (3/17 - ) and RPT BCx 3/18 negative  - ID following  - plan for 2 week course after negative BCx to 4/2  - 3/20 Bcx- NGTD    # MFPNA and abdominal pannus cellulitis   - Presented to  post URI with SOB and found with AHRF with progression to ARDS second to post viral MFPNA   - RVP, legionella, strept, BAL, BCx, UCx, HIV, PCP, and adenovirus PCR negative   - Initially started on multiple agents in ICU and ultimatelty completed zosyn (2/26-3/9) ZMAX (2/25-2/26) and vanco (2/26-3/1)     # Penile discharge   - GC/ chlamydia negative   - HIV negative   - Monitor for now    # PPX   - MRSA PCR positive and completed bactroban (2/26-3/3)     HEME   # Anemia likely second to ECMO circuit vs AOCD   - HH low in ICU second to ECMO circuit   - HH dropping again today however no bleeding noted   - Microcytic and hemochromic, sent anemia panel 3/19  - Trend      VASCULAR   # R IJ and BL LE DVTs   - Post ECMO dopplers on 3/9 negative for BL UE/ LE DVTs.   - Subsequent post ECMO dopplers performed on 3/14 and noted with acute, non-occlusive deep vein thrombosis noted within the right internal jugular vein. acute, occlusive deep vein thromboses noted within the right and left peroneal veins at both mid calves, and age-indeterminate, occlusive deep vein thrombosis visualized within the left gastrocnemius vein at the proximal calf.     - Continue on argatroban GTT and change to eliquis     PODIATRY   # BL plantar feet blisters likely pressors induced  - Seen by podiatry and blisters lanced on 3/4 and again on 3/18  - Continue on local wound care per podiatry   - Podiatry following  - OK for WBAT will fu with PT for offloading shoe if appropriate  - Pain mgt consult     ENDOCRINE  # Pre-DM2   - A1C 6.7  - Continue on ISS   - Monitor FS   - IF no demand then stop ISS     LINES/ TUBES  - R IJ and R FEM ECMO (2/26-3/7)     SKIN  # Pannus canndial intertrigo   # Sacrum/ buttock MAD  - Continue on clotrimazole cream   - WOC appreciated   -Seen by WC pt noted to have oozing from Buttock wound surgicel placed by wound care On follow up no further bleeding  - Wound care placed orders   - No bleeding     ETHICS/ GOC    - FULL CODE     DISPO - PT and PMR recc [facility], however will continue to reassess for acute Seen by PMR and goal for acute rehab     40 YO M with PMHx of morbid obesity, BEA on CPAP, pAFIB (not on AC), HTN, and BL papilledema attributed to pseudotumor cerebri who presented initially to [facility] on 2/24 with SOB and BL LE edema. Found with URI outpatient with progressive SOB, and concern for noted for MFPNA on imaging. Course progressed with AHRF with worsening O2 demand, respiratory distress, secretions, and somnolence requiring intubation (difficult with success after 6 attempts) in ED and admission to [facility] MICU. While in MICU, patient noted with increasing O2 demand with no improvement despite optimal vent management. Concern noted for progressive ARDS, unable to prone given morbid obesity, and ultimately cannulated for vvECMO on 2/25 and transferred to [facility] for further management on 2/26. While at [facility], tx with diuresis and ABX, and ultimately decannulated and s/p 60XLTCP trach and PEG on 3/7. Patient ultimately transferred to [facility]CU on 3/11 and course complicated by recurrent high grade fevers and found with fungemia.    NEUROLOGY  # Hx of Pseudotumor Cerebri   - s/p LP in 2024 with high opening pressure  - s/p MRI 2024 with possible pituitary mass but unclear because of pressure effect from pseudotumor cerebri causing partially empty sella.  - Prolactin elevated   - ACTH low but recieved steroids during admission   - FT4 low normal and TSH normal, but given FT4 low normal TSH should be higher   - Discuss endocrine consult for inhouse work up vs outpatient.     # Sedation   - Weaned off sedation in ICU   - Nimbex turned off 2/27   - Prop turned off 3/9   - Precedex turned off and catapres started 3/11  - Continue on catapres 0.2 TID (decreased 3/25) - taper    # Insomnia   - Patient worked night shift x 15 years   - Trouble sleeping at night leading to day time sleepiness and poor participation with PT  - Continue on Seroquel 25 QHS (increased 3/18)   - Continue on melatonin   - Continue on neurontin as below    # BL LE neuropathy   - CIPN concern   - Neurontin increased to 300mg Q8H with relief   - added Percocet Q4H for moderate pain (3/25) and continue on Dilaudid Q6H for severe pain   - Pain mgt called to consult - with recs for tylenol ATC, NEurontin increased to 400mg q6, Oxycodone 5 q4h prn , Dilaudid for BREAKTHROUGH pain only, Lidocaine patches on each leg, ice prn to feet  - Some improvement in pain - PT consult for TENS,   - osycodone inc to 10mg as pt with no relief /buttocks area indurated /tender CT ordered     CARDIOVASCULAR  # HTN   - HTN noted in ICU requiring cardene and esmolol GTTs   - Lisinopril dc'ed to increase catapres   - Continue on norvasc 10 and catapres 0.3 TID   - Monitor BP     # AFIB   - Hx of pAFIB   - No RVR episodes or pAFIB episodes in ICU   - No rate control medications needed  - Monitor on telemetry     # RV dilation second to PHTN   - POCUS on arrival to [facility] with RVE concern   - TTE 10/2024 with EF 55-60 with normal LVSF, moderate LVH and normal RVSF and size with no concern for pHTN   - TTE on 2/25 at  with EF 61 and normal LVSF, but enlarged RV size with reduced RVSF, mild TR, mild PHTN with PASP 49, and dilated IVC to 3.4cn, but normal TAPSE 2.1.  - Continue on aggressive diuresis in ICU    - TTE 3/11 with RVSF reduced with TAPSE 1.6. IVC improved to 2.12cm.   - Continue on lasix 40 PO BID   - Monitor IO    RESPIRATORY  # ARDS second to MFPNA   - Hx of BEA on CPAP presented to  post URI with SOB and found with AHRF with progression to ARDS second to post viral MFPNA   - Intubated 2/25   - Cannulated for vvECMO 2/26   - s/p 60XLTCP tracheostomy 3/7   - Decannulated 3/7   - CTSx removed tracheostomy and ECMO sutures on 3/17   - Continue on TC QD with PMV as able with strong phonation  - Continue on PS 18/10/40 QHS given OHS   - Continue on nebs and HTS   -  trials continue and now trialing overnight with BIPAP for OHS (10/8/12)  - trend VBG QD  - Decannulated 3/28     GI  # Dysphagia   - s/p PEG 3/7   - Attempted green dye on 3/13 and failed  - Continue on TF  - Continue on miralax and senna  - RPT green dye passed 3/19  - 3/20 Passed FEEST - on soft bite sized with thin liquids     # Mild transaminitis   - LFTs elevated in ICU with TBILI elevated likely from ECMO hemolysis   - US ABD with hepatic steatosis   - Trend LFTs      RENAL  # ELLA   - ELLA likely from cardiorenal with RVE   - Diuresed in ICU and improved   - Monitor renal function and UOP     # Hypernatremia   - Hypernatremia with fever spikes   - Fever curve improved and attempting to wean FWF   - Continue on  Q6H (decreased 3/19)    INFECTIOUS DISEASE  # Recurrent fevers with fungemia from unknown source   - Recurrent fevers post ECMO decannulation thought initially to be cytokine surge   - BCx, SCx, UCx, RVP and MRSA RPT on 3/12 negative   - Completed zosyn (3/12-3/18) empirically with improved WBC , however recurrent fevers noted.   - RPT SCx, UA, CXR and RVP negative  - TTE 3/17 with no IE  - PanCT 3/17 with PNA and persistent panniculitis   - BCx 3/15 and 3/16 with candida albicans.   - Concern for possible source from panniculitis   - Continue on caspo (3/17 - ) and RPT BCx 3/18 negative  - ID following  - plan for 2 week course after negative BCx to 4/2  - 3/20 Bcx- NGTD    # MFPNA and abdominal pannus cellulitis   - Presented to  post URI with SOB and found with AHRF with progression to ARDS second to post viral MFPNA   - RVP, legionella, strept, BAL, BCx, UCx, HIV, PCP, and adenovirus PCR negative   - Initially started on multiple agents in ICU and ultimatelty completed zosyn (2/26-3/9) ZMAX (2/25-2/26) and vanco (2/26-3/1)     # Penile discharge   - GC/ chlamydia negative   - HIV negative   - Monitor for now    # PPX   - MRSA PCR positive and completed bactroban (2/26-3/3)     HEME   # Anemia likely second to ECMO circuit vs AOCD   - HH low in ICU second to ECMO circuit   - HH dropping again today however no bleeding noted   - Microcytic and hemochromic, sent anemia panel 3/19  - Trend HH     VASCULAR   # R IJ and BL LE DVTs   - Post ECMO dopplers on 3/9 negative for BL UE/ LE DVTs.   - Subsequent post ECMO dopplers performed on 3/14 and noted with acute, non-occlusive deep vein thrombosis noted within the right internal jugular vein. acute, occlusive deep vein thromboses noted within the right and left peroneal veins at both mid calves, and age-indeterminate, occlusive deep vein thrombosis visualized within the left gastrocnemius vein at the proximal calf.     - Continue on argatroban GTT and change to eliquis     PODIATRY   # BL plantar feet blisters likely pressors induced  - Seen by podiatry and blisters lanced on 3/4 and again on 3/18  - Continue on local wound care per podiatry   - Podiatry following  - OK for WBAT will fu with PT for offloading shoe if appropriate  - Pain mgt consult   - Podiatry FU 3/28 done     ENDOCRINE  # Pre-DM2   - A1C 6.7  - Continue on ISS   - Monitor FS   - IF no demand then stop ISS     LINES/ TUBES  - R IJ and R FEM ECMO (2/26-3/7)     SKIN  # Pannus canndial intertrigo   # Sacrum/ buttock MAD  - Continue on clotrimazole cream   - WOC appreciated   -Seen by WC pt noted to have oozing from Buttock wound surgicel placed by wound care On follow up no further bleeding  - Wound care placed orders   - No bleeding     ETHICS/ GOC    - FULL CODE     DISPO - PT following  Seen by PMR for acute rehab per recs

## 2025-03-29 LAB
ANION GAP SERPL CALC-SCNC: 13 MMOL/L — SIGNIFICANT CHANGE UP (ref 7–14)
BUN SERPL-MCNC: 6 MG/DL — LOW (ref 7–23)
CALCIUM SERPL-MCNC: 9 MG/DL — SIGNIFICANT CHANGE UP (ref 8.4–10.5)
CHLORIDE SERPL-SCNC: 93 MMOL/L — LOW (ref 98–107)
CO2 SERPL-SCNC: 30 MMOL/L — SIGNIFICANT CHANGE UP (ref 22–31)
CREAT SERPL-MCNC: 0.83 MG/DL — SIGNIFICANT CHANGE UP (ref 0.5–1.3)
CULTURE RESULTS: SIGNIFICANT CHANGE UP
EGFR: 116 ML/MIN/1.73M2 — SIGNIFICANT CHANGE UP
EGFR: 116 ML/MIN/1.73M2 — SIGNIFICANT CHANGE UP
GLUCOSE BLDC GLUCOMTR-MCNC: 103 MG/DL — HIGH (ref 70–99)
GLUCOSE BLDC GLUCOMTR-MCNC: 117 MG/DL — HIGH (ref 70–99)
GLUCOSE BLDC GLUCOMTR-MCNC: 97 MG/DL — SIGNIFICANT CHANGE UP (ref 70–99)
GLUCOSE SERPL-MCNC: 87 MG/DL — SIGNIFICANT CHANGE UP (ref 70–99)
HCT VFR BLD CALC: 23.5 % — LOW (ref 39–50)
HGB BLD-MCNC: 8.5 G/DL — LOW (ref 13–17)
MAGNESIUM SERPL-MCNC: 1.5 MG/DL — LOW (ref 1.6–2.6)
MCHC RBC-ENTMCNC: 32.8 PG — SIGNIFICANT CHANGE UP (ref 27–34)
MCHC RBC-ENTMCNC: 36.2 G/DL — HIGH (ref 32–36)
MCV RBC AUTO: 90.7 FL — SIGNIFICANT CHANGE UP (ref 80–100)
NRBC # BLD AUTO: 0 K/UL — SIGNIFICANT CHANGE UP (ref 0–0)
NRBC # FLD: 0 K/UL — SIGNIFICANT CHANGE UP (ref 0–0)
NRBC BLD AUTO-RTO: 0 /100 WBCS — SIGNIFICANT CHANGE UP (ref 0–0)
PHOSPHATE SERPL-MCNC: 4.8 MG/DL — HIGH (ref 2.5–4.5)
PLATELET # BLD AUTO: 408 K/UL — HIGH (ref 150–400)
POTASSIUM SERPL-MCNC: 3.8 MMOL/L — SIGNIFICANT CHANGE UP (ref 3.5–5.3)
POTASSIUM SERPL-SCNC: 3.8 MMOL/L — SIGNIFICANT CHANGE UP (ref 3.5–5.3)
RBC # BLD: 2.59 M/UL — LOW (ref 4.2–5.8)
RBC # FLD: 24.9 % — HIGH (ref 10.3–14.5)
SODIUM SERPL-SCNC: 136 MMOL/L — SIGNIFICANT CHANGE UP (ref 135–145)
SPECIMEN SOURCE: SIGNIFICANT CHANGE UP
WBC # BLD: 11.81 K/UL — HIGH (ref 3.8–10.5)
WBC # FLD AUTO: 11.81 K/UL — HIGH (ref 3.8–10.5)

## 2025-03-29 PROCEDURE — 72193 CT PELVIS W/DYE: CPT | Mod: 26

## 2025-03-29 PROCEDURE — 99233 SBSQ HOSP IP/OBS HIGH 50: CPT

## 2025-03-29 RX ORDER — B1/B2/B3/B5/B6/B12/VIT C/FOLIC 500-0.5 MG
1 TABLET ORAL DAILY
Refills: 0 | Status: DISCONTINUED | OUTPATIENT
Start: 2025-03-30 | End: 2025-04-25

## 2025-03-29 RX ORDER — MAGNESIUM SULFATE 500 MG/ML
1 SYRINGE (ML) INJECTION ONCE
Refills: 0 | Status: COMPLETED | OUTPATIENT
Start: 2025-03-29 | End: 2025-03-29

## 2025-03-29 RX ORDER — MUPIROCIN CALCIUM 20 MG/G
1 CREAM TOPICAL
Refills: 0 | Status: DISCONTINUED | OUTPATIENT
Start: 2025-03-29 | End: 2025-04-25

## 2025-03-29 RX ADMIN — Medication 0.2 MILLIGRAM(S): at 21:30

## 2025-03-29 RX ADMIN — Medication 650 MILLIGRAM(S): at 06:26

## 2025-03-29 RX ADMIN — APIXABAN 5 MILLIGRAM(S): 2.5 TABLET, FILM COATED ORAL at 05:39

## 2025-03-29 RX ADMIN — QUETIAPINE FUMARATE 25 MILLIGRAM(S): 25 TABLET ORAL at 21:29

## 2025-03-29 RX ADMIN — POLYETHYLENE GLYCOL 3350 17 GRAM(S): 17 POWDER, FOR SOLUTION ORAL at 11:23

## 2025-03-29 RX ADMIN — FUROSEMIDE 40 MILLIGRAM(S): 10 INJECTION INTRAMUSCULAR; INTRAVENOUS at 05:39

## 2025-03-29 RX ADMIN — LIDOCAINE HYDROCHLORIDE 1 PATCH: 20 JELLY TOPICAL at 11:23

## 2025-03-29 RX ADMIN — Medication 40 MILLIGRAM(S): at 06:23

## 2025-03-29 RX ADMIN — Medication 0.5 MILLIGRAM(S): at 17:25

## 2025-03-29 RX ADMIN — APIXABAN 5 MILLIGRAM(S): 2.5 TABLET, FILM COATED ORAL at 17:17

## 2025-03-29 RX ADMIN — SODIUM HYPOCHLORITE 1 APPLICATION(S): 0.12 SOLUTION TOPICAL at 11:21

## 2025-03-29 RX ADMIN — LIDOCAINE HYDROCHLORIDE 1 PATCH: 20 JELLY TOPICAL at 00:00

## 2025-03-29 RX ADMIN — GABAPENTIN 400 MILLIGRAM(S): 400 CAPSULE ORAL at 14:16

## 2025-03-29 RX ADMIN — Medication 0.5 MILLIGRAM(S): at 18:09

## 2025-03-29 RX ADMIN — Medication 0.2 MILLIGRAM(S): at 14:17

## 2025-03-29 RX ADMIN — LIDOCAINE HYDROCHLORIDE 1 PATCH: 20 JELLY TOPICAL at 23:00

## 2025-03-29 RX ADMIN — OXYCODONE HYDROCHLORIDE 10 MILLIGRAM(S): 30 TABLET ORAL at 03:09

## 2025-03-29 RX ADMIN — LIDOCAINE HYDROCHLORIDE 1 PATCH: 20 JELLY TOPICAL at 11:22

## 2025-03-29 RX ADMIN — Medication 650 MILLIGRAM(S): at 20:41

## 2025-03-29 RX ADMIN — FUROSEMIDE 40 MILLIGRAM(S): 10 INJECTION INTRAMUSCULAR; INTRAVENOUS at 14:25

## 2025-03-29 RX ADMIN — IPRATROPIUM BROMIDE AND ALBUTEROL SULFATE 3 MILLILITER(S): .5; 2.5 SOLUTION RESPIRATORY (INHALATION) at 08:14

## 2025-03-29 RX ADMIN — CLOTRIMAZOLE 1 APPLICATION(S): 1 CREAM TOPICAL at 05:40

## 2025-03-29 RX ADMIN — Medication 0.2 MILLIGRAM(S): at 05:39

## 2025-03-29 RX ADMIN — CASPOFUNGIN ACETATE 260 MILLIGRAM(S): 5 INJECTION, POWDER, LYOPHILIZED, FOR SOLUTION INTRAVENOUS at 04:28

## 2025-03-29 RX ADMIN — Medication 3 MILLIGRAM(S): at 21:29

## 2025-03-29 RX ADMIN — OXYCODONE HYDROCHLORIDE 10 MILLIGRAM(S): 30 TABLET ORAL at 20:41

## 2025-03-29 RX ADMIN — IPRATROPIUM BROMIDE AND ALBUTEROL SULFATE 3 MILLILITER(S): .5; 2.5 SOLUTION RESPIRATORY (INHALATION) at 14:59

## 2025-03-29 RX ADMIN — MUPIROCIN CALCIUM 1 APPLICATION(S): 20 CREAM TOPICAL at 05:41

## 2025-03-29 RX ADMIN — Medication 1 APPLICATION(S): at 05:38

## 2025-03-29 RX ADMIN — CLOTRIMAZOLE 1 APPLICATION(S): 1 CREAM TOPICAL at 17:18

## 2025-03-29 RX ADMIN — IPRATROPIUM BROMIDE AND ALBUTEROL SULFATE 3 MILLILITER(S): .5; 2.5 SOLUTION RESPIRATORY (INHALATION) at 21:50

## 2025-03-29 RX ADMIN — OXYCODONE HYDROCHLORIDE 10 MILLIGRAM(S): 30 TABLET ORAL at 11:22

## 2025-03-29 RX ADMIN — OXYCODONE HYDROCHLORIDE 10 MILLIGRAM(S): 30 TABLET ORAL at 21:30

## 2025-03-29 RX ADMIN — Medication 2 TABLET(S): at 21:29

## 2025-03-29 RX ADMIN — Medication 15 MILLILITER(S): at 11:22

## 2025-03-29 RX ADMIN — Medication 15 MILLILITER(S): at 17:17

## 2025-03-29 RX ADMIN — GABAPENTIN 400 MILLIGRAM(S): 400 CAPSULE ORAL at 20:42

## 2025-03-29 RX ADMIN — Medication 100 GRAM(S): at 14:17

## 2025-03-29 RX ADMIN — LIDOCAINE HYDROCHLORIDE 1 PATCH: 20 JELLY TOPICAL at 19:00

## 2025-03-29 RX ADMIN — Medication 650 MILLIGRAM(S): at 05:39

## 2025-03-29 RX ADMIN — Medication 40 MILLIEQUIVALENT(S): at 17:17

## 2025-03-29 RX ADMIN — AMLODIPINE BESYLATE 10 MILLIGRAM(S): 10 TABLET ORAL at 05:39

## 2025-03-29 RX ADMIN — Medication 15 MILLILITER(S): at 05:39

## 2025-03-29 RX ADMIN — GABAPENTIN 400 MILLIGRAM(S): 400 CAPSULE ORAL at 05:39

## 2025-03-29 RX ADMIN — IPRATROPIUM BROMIDE AND ALBUTEROL SULFATE 3 MILLILITER(S): .5; 2.5 SOLUTION RESPIRATORY (INHALATION) at 03:41

## 2025-03-29 RX ADMIN — Medication 650 MILLIGRAM(S): at 14:17

## 2025-03-29 RX ADMIN — OXYCODONE HYDROCHLORIDE 10 MILLIGRAM(S): 30 TABLET ORAL at 12:00

## 2025-03-29 NOTE — PROGRESS NOTE ADULT - SUBJECTIVE AND OBJECTIVE BOX
CHIEF COMPLAINT:Patient is a 39y old  Male who presents with a chief complaint of sob (02 Mar 2025 06:19)      INTERVAL EVENTS:     ROS: Seen by bedside during AM rounds     OBJECTIVE:  ICU Vital Signs Last 24 Hrs  T(C): 37.3 (29 Mar 2025 04:00), Max: 37.3 (29 Mar 2025 04:00)  T(F): 99.2 (29 Mar 2025 04:00), Max: 99.2 (29 Mar 2025 04:00)  HR: 116 (29 Mar 2025 04:00) (102 - 118)  BP: 134/80 (29 Mar 2025 04:00) (123/74 - 140/77)  BP(mean): --  ABP: --  ABP(mean): --  RR: 22 (29 Mar 2025 04:00) (19 - 22)  SpO2: 100% (29 Mar 2025 04:00) (96% - 100%)    O2 Parameters below as of 29 Mar 2025 04:00    O2 Flow (L/min): 4            03-28 @ 07:01  -  03-29 @ 07:00  --------------------------------------------------------  IN: 1200 mL / OUT: 3800 mL / NET: -2600 mL      CAPILLARY BLOOD GLUCOSE      POCT Blood Glucose.: 96 mg/dL (28 Mar 2025 22:04)      PHYSICAL EXAM:  General:   HEENT:   Lymph Nodes:  Neck:   Respiratory:   Cardiovascular:   Abdomen:   Extremities:   Skin:   Neurological:  Psychiatry:        HOSPITAL MEDICATIONS:  MEDICATIONS  (STANDING):  acetaminophen     Tablet .. 650 milliGRAM(s) Oral every 8 hours  albuterol/ipratropium for Nebulization 3 milliLiter(s) Nebulizer every 6 hours  amLODIPine   Tablet 10 milliGRAM(s) Oral daily  apixaban 5 milliGRAM(s) Oral every 12 hours  caspofungin IVPB 70 milliGRAM(s) IV Intermittent every 24 hours  chlorhexidine 0.12% Liquid 15 milliLiter(s) Swish and Spit two times a day  chlorhexidine 2% Cloths 1 Application(s) Topical <User Schedule>  cloNIDine 0.2 milliGRAM(s) Oral every 8 hours  clotrimazole 1% Cream 1 Application(s) Topical two times a day  Dakins Solution - 1/4 Strength 1 Application(s) Topical daily  dextrose 50% Injectable 25 Gram(s) IV Push once  dextrose 50% Injectable 12.5 Gram(s) IV Push once  dextrose 50% Injectable 25 Gram(s) IV Push once  furosemide    Tablet 40 milliGRAM(s) Oral two times a day  gabapentin 400 milliGRAM(s) Oral every 8 hours  glucagon  Injectable 1 milliGRAM(s) IntraMuscular once  insulin lispro (ADMELOG) corrective regimen sliding scale   SubCutaneous three times a day before meals  lidocaine   4% Patch 1 Patch Transdermal daily  lidocaine   4% Patch 1 Patch Transdermal daily  melatonin 3 milliGRAM(s) Oral at bedtime  multivitamin/minerals/iron Oral Solution (CENTRUM) 15 milliLiter(s) Oral daily  mupirocin 2% Ointment 1 Application(s) Topical two times a day  pantoprazole    Tablet 40 milliGRAM(s) Oral before breakfast  polyethylene glycol 3350 17 Gram(s) Oral daily  QUEtiapine 25 milliGRAM(s) Oral at bedtime  senna 2 Tablet(s) Oral at bedtime    MEDICATIONS  (PRN):  HYDROmorphone  Injectable 0.5 milliGRAM(s) IV Push every 6 hours PRN MOd-Severe Pain (4 - 10)  oxyCODONE    IR 10 milliGRAM(s) Oral every 4 hours PRN MOD/SEVERE PAIN 4-10      LABS:                        8.5    10.47 )-----------( 501      ( 28 Mar 2025 06:05 )             25.6     03-28    138  |  93[L]  |  6[L]  ----------------------------<  101[H]  3.5   |  35[H]  |  0.94    Ca    9.3      28 Mar 2025 06:05  Phos  5.5     03-28  Mg     1.70     03-28        Urinalysis Basic - ( 28 Mar 2025 06:05 )    Color: x / Appearance: x / SG: x / pH: x  Gluc: 101 mg/dL / Ketone: x  / Bili: x / Urobili: x   Blood: x / Protein: x / Nitrite: x   Leuk Esterase: x / RBC: x / WBC x   Sq Epi: x / Non Sq Epi: x / Bacteria: x        Venous Blood Gas:  03-28 @ 06:05  7.36/68/73/38/96.0  VBG Lactate: --   CHIEF COMPLAINT:Patient is a 39y old  Male who presents with a chief complaint of sob (02 Mar 2025 06:19)      INTERVAL EVENTS:     ROS: Seen by bedside during AM rounds     OBJECTIVE:  ICU Vital Signs Last 24 Hrs  T(C): 37.3 (29 Mar 2025 04:00), Max: 37.3 (29 Mar 2025 04:00)  T(F): 99.2 (29 Mar 2025 04:00), Max: 99.2 (29 Mar 2025 04:00)  HR: 116 (29 Mar 2025 04:00) (102 - 118)  BP: 134/80 (29 Mar 2025 04:00) (123/74 - 140/77)  BP(mean): --  ABP: --  ABP(mean): --  RR: 22 (29 Mar 2025 04:00) (19 - 22)  SpO2: 100% (29 Mar 2025 04:00) (96% - 100%)    O2 Parameters below as of 29 Mar 2025 04:00    O2 Flow (L/min): 4            03-28 @ 07:01  -  03-29 @ 07:00  --------------------------------------------------------  IN: 1200 mL / OUT: 3800 mL / NET: -2600 mL      CAPILLARY BLOOD GLUCOSE      POCT Blood Glucose.: 96 mg/dL (28 Mar 2025 22:04)      PHYSICAL EXAM:  General: NAD   Neck: Dressing overlying preexisting trach tube stoma.   Cards: S1/S2, no murmurs   Pulm: CTA bilaterally. No wheezes.   Abdomen: Morbidliy obese abdomen. Soft, NTND. (+) PEG noted, site c/d/i.   Extremities: B/l UE and LE edema. Extremities warm to touch.  Neurology: AOx3. 5/5 motor strength x4E. Following basic commands.   Skin: warm to touch, color appropriate for ethnicity. Refer to RN assessment for further details.      HOSPITAL MEDICATIONS:  MEDICATIONS  (STANDING):  acetaminophen     Tablet .. 650 milliGRAM(s) Oral every 8 hours  albuterol/ipratropium for Nebulization 3 milliLiter(s) Nebulizer every 6 hours  amLODIPine   Tablet 10 milliGRAM(s) Oral daily  apixaban 5 milliGRAM(s) Oral every 12 hours  caspofungin IVPB 70 milliGRAM(s) IV Intermittent every 24 hours  chlorhexidine 0.12% Liquid 15 milliLiter(s) Swish and Spit two times a day  chlorhexidine 2% Cloths 1 Application(s) Topical <User Schedule>  cloNIDine 0.2 milliGRAM(s) Oral every 8 hours  clotrimazole 1% Cream 1 Application(s) Topical two times a day  Dakins Solution - 1/4 Strength 1 Application(s) Topical daily  dextrose 50% Injectable 25 Gram(s) IV Push once  dextrose 50% Injectable 12.5 Gram(s) IV Push once  dextrose 50% Injectable 25 Gram(s) IV Push once  furosemide    Tablet 40 milliGRAM(s) Oral two times a day  gabapentin 400 milliGRAM(s) Oral every 8 hours  glucagon  Injectable 1 milliGRAM(s) IntraMuscular once  insulin lispro (ADMELOG) corrective regimen sliding scale   SubCutaneous three times a day before meals  lidocaine   4% Patch 1 Patch Transdermal daily  lidocaine   4% Patch 1 Patch Transdermal daily  melatonin 3 milliGRAM(s) Oral at bedtime  multivitamin/minerals/iron Oral Solution (CENTRUM) 15 milliLiter(s) Oral daily  mupirocin 2% Ointment 1 Application(s) Topical two times a day  pantoprazole    Tablet 40 milliGRAM(s) Oral before breakfast  polyethylene glycol 3350 17 Gram(s) Oral daily  QUEtiapine 25 milliGRAM(s) Oral at bedtime  senna 2 Tablet(s) Oral at bedtime    MEDICATIONS  (PRN):  HYDROmorphone  Injectable 0.5 milliGRAM(s) IV Push every 6 hours PRN MOd-Severe Pain (4 - 10)  oxyCODONE    IR 10 milliGRAM(s) Oral every 4 hours PRN MOD/SEVERE PAIN 4-10      LABS:                        8.5    10.47 )-----------( 501      ( 28 Mar 2025 06:05 )             25.6     03-28    138  |  93[L]  |  6[L]  ----------------------------<  101[H]  3.5   |  35[H]  |  0.94    Ca    9.3      28 Mar 2025 06:05  Phos  5.5     03-28  Mg     1.70     03-28        Urinalysis Basic - ( 28 Mar 2025 06:05 )    Color: x / Appearance: x / SG: x / pH: x  Gluc: 101 mg/dL / Ketone: x  / Bili: x / Urobili: x   Blood: x / Protein: x / Nitrite: x   Leuk Esterase: x / RBC: x / WBC x   Sq Epi: x / Non Sq Epi: x / Bacteria: x        Venous Blood Gas:  03-28 @ 06:05  7.36/68/73/38/96.0  VBG Lactate: --   CHIEF COMPLAINT:Patient is a 39y old  Male who presents with a chief complaint of sob (02 Mar 2025 06:19)      INTERVAL EVENTS: no overnight events noted.     ROS: Seen by bedside during AM rounds     OBJECTIVE:  ICU Vital Signs Last 24 Hrs  T(C): 37.3 (29 Mar 2025 04:00), Max: 37.3 (29 Mar 2025 04:00)  T(F): 99.2 (29 Mar 2025 04:00), Max: 99.2 (29 Mar 2025 04:00)  HR: 116 (29 Mar 2025 04:00) (102 - 118)  BP: 134/80 (29 Mar 2025 04:00) (123/74 - 140/77)  BP(mean): --  ABP: --  ABP(mean): --  RR: 22 (29 Mar 2025 04:00) (19 - 22)  SpO2: 100% (29 Mar 2025 04:00) (96% - 100%)    O2 Parameters below as of 29 Mar 2025 04:00    O2 Flow (L/min): 4            03-28 @ 07:01  -  03-29 @ 07:00  --------------------------------------------------------  IN: 1200 mL / OUT: 3800 mL / NET: -2600 mL      CAPILLARY BLOOD GLUCOSE      POCT Blood Glucose.: 96 mg/dL (28 Mar 2025 22:04)      PHYSICAL EXAM:  General: NAD   Neck: Dressing overlying preexisting trach tube stoma.   Cards: S1/S2, no murmurs   Pulm: CTA bilaterally. No wheezes.   Abdomen: Morbidliy obese abdomen. Soft, NTND. (+) PEG noted, site c/d/i.   Extremities: B/l UE and LE edema. Extremities warm to touch.  Neurology: AOx3. 5/5 motor strength x4E. Following basic commands.   Skin: warm to touch, color appropriate for ethnicity. Refer to RN assessment for further details.      HOSPITAL MEDICATIONS:  MEDICATIONS  (STANDING):  acetaminophen     Tablet .. 650 milliGRAM(s) Oral every 8 hours  albuterol/ipratropium for Nebulization 3 milliLiter(s) Nebulizer every 6 hours  amLODIPine   Tablet 10 milliGRAM(s) Oral daily  apixaban 5 milliGRAM(s) Oral every 12 hours  caspofungin IVPB 70 milliGRAM(s) IV Intermittent every 24 hours  chlorhexidine 0.12% Liquid 15 milliLiter(s) Swish and Spit two times a day  chlorhexidine 2% Cloths 1 Application(s) Topical <User Schedule>  cloNIDine 0.2 milliGRAM(s) Oral every 8 hours  clotrimazole 1% Cream 1 Application(s) Topical two times a day  Dakins Solution - 1/4 Strength 1 Application(s) Topical daily  dextrose 50% Injectable 25 Gram(s) IV Push once  dextrose 50% Injectable 12.5 Gram(s) IV Push once  dextrose 50% Injectable 25 Gram(s) IV Push once  furosemide    Tablet 40 milliGRAM(s) Oral two times a day  gabapentin 400 milliGRAM(s) Oral every 8 hours  glucagon  Injectable 1 milliGRAM(s) IntraMuscular once  insulin lispro (ADMELOG) corrective regimen sliding scale   SubCutaneous three times a day before meals  lidocaine   4% Patch 1 Patch Transdermal daily  lidocaine   4% Patch 1 Patch Transdermal daily  melatonin 3 milliGRAM(s) Oral at bedtime  multivitamin/minerals/iron Oral Solution (CENTRUM) 15 milliLiter(s) Oral daily  mupirocin 2% Ointment 1 Application(s) Topical two times a day  pantoprazole    Tablet 40 milliGRAM(s) Oral before breakfast  polyethylene glycol 3350 17 Gram(s) Oral daily  QUEtiapine 25 milliGRAM(s) Oral at bedtime  senna 2 Tablet(s) Oral at bedtime    MEDICATIONS  (PRN):  HYDROmorphone  Injectable 0.5 milliGRAM(s) IV Push every 6 hours PRN MOd-Severe Pain (4 - 10)  oxyCODONE    IR 10 milliGRAM(s) Oral every 4 hours PRN MOD/SEVERE PAIN 4-10      LABS:                        8.5    10.47 )-----------( 501      ( 28 Mar 2025 06:05 )             25.6     03-28    138  |  93[L]  |  6[L]  ----------------------------<  101[H]  3.5   |  35[H]  |  0.94    Ca    9.3      28 Mar 2025 06:05  Phos  5.5     03-28  Mg     1.70     03-28        Urinalysis Basic - ( 28 Mar 2025 06:05 )    Color: x / Appearance: x / SG: x / pH: x  Gluc: 101 mg/dL / Ketone: x  / Bili: x / Urobili: x   Blood: x / Protein: x / Nitrite: x   Leuk Esterase: x / RBC: x / WBC x   Sq Epi: x / Non Sq Epi: x / Bacteria: x        Venous Blood Gas:  03-28 @ 06:05  7.36/68/73/38/96.0  VBG Lactate: --

## 2025-03-29 NOTE — PROGRESS NOTE ADULT - NS ATTEND AMEND GEN_ALL_CORE FT
Pt is a 39M with MHx obesity (Class III, BMI 69), pAfib (no AC), HTN, BEA (dz'ed 2019, AHI 34 non-compliant on CPAP set at 10), and history of b/l papilledema attributed to pseudotumor cerebri initially presenting as a transfer [facility] on 2/26 for acute hypoxemic respiratory failure s/p ETT intubation/MV with progression to refractory ARDS s/p initiation of vv-ECMO support (2/26-3/7) with failure to wean from ventilator s/p tracheostomy placement, further found to have RV failure s/p aggressive diuresis and PNA followed by severe sepsis 2/2 panniculitis c/b candida albicans fungemia now transferred to [facility] for further medical management.     Pt now awake and alert, spontaneously communicative and at mental status baseline. Continues to have severe functional debility likely 2/2 prolonged critical illness polyneuropathy but is progressing well. Now able to transfer via [place] to chair daily and is independent of UE and fine motor function. Off all sedation. Weaning of catapres initiated 2/25 (decreased to 0.2mg TID.) Weaning pain control, pt remains dependent on Dilaudid prn for LE neuropathic pain. PO regimen added and gabapentin increased without benefit. Pain mgmt team consulted, recs appreciated. Yesterday there was concern for worsening coolness of toes i/s/o severe pain. Podiatry consulted, possible worsening of right 5th digit that may require podiatric sx. Will CTM for now.      Pt with acute combined hypoxemic and hypercapnic respiratory failure with failure to wean from ventilator s/p tracheostomy placement. Now tolerating TC QD, PSV (18/10) qhs. Now on TC trial, using oral interface for qhs Bilevel support. Airway clearance therapy in place. Wean O2 supplementation for goal O2 saturation 90-95%. Aspiration precautions and oral hygiene in place. Decannulated at bedside 3/28.     Pt further presenting with acute on chronic hypercapnia and will require Bilevel after discharge for chronic use 2/2 chronic hypercapnic respiratory failure from hypoventilation and obesity hypoventilation syndrome (BMI 69.) Pt has exhibited an inability to maintain appropriate ventilation with persistent hypercapnia without Bilevel support qhs. Pt has high CO2 levels (PaCO2 on ABG >52.) PaCO2 done immediately upon awakening while breathing his prescribed FiO2 shows the patient's PaCO2 worsened >7mmHg compared to initial ABG. Patient's OHS requires ventilatory support. Due to the patient's severe disease state and severe dyspnea, Bilevel is necessary. Due to the patient's decreased ventilatory drive and high CO2 levels, without Bilevel support patient will experience rapid clinical deterioration, which will lead to serious medical harm. Acute on chronic hypercapnic respiratory failure due to patient (BMI 69) obesity, which is causing restriction of the thoracic cage not allowing for proper gas exchange.     Pt initially p/w RV failure 2/2 pulmHTN now s/p aggressive diuresis with improvement. Now transitioned to PO diuretics with goal to maintain euvolemia. Acetazolamide added today. AFib well controlled conservatively, will continue to monitor on telemetry. TTE 2/25 with new RVE, 3/11 with RVSD. DVT noted on repeat duplex of the right IJ and bilateral peroneal veins. Will c/w full A/C on Eliquis for DVT and AFib.     Pt with oropharyngeal dysphagia s/p PEG placement (3/7) now transitioned to PO diet (3/20) via FEEST. Will continue to monitor on soft diet. Bowel regimen in place prn. PPI ppx. Transaminitis improving.     Pt further found to have severe sepsis 2/ candida albicans fungemia possibly from panniculitis (3/15, cleared 3/18) now on caspofungin with plan to complete 14 day course from clearance. TTE (-) endocarditis. Wound care team following. ID recs appreciated. Further found to have worsening sacral ulceration with induration, will obtain CT to r/o evolving abscess/collection.    Dispo pending medical optimization. Pt full code. DVT ppx in place. Palliative consulted, recs appreciated. Will continue to update family and discuss plan of care throughout hospitalization.

## 2025-03-29 NOTE — PROGRESS NOTE ADULT - ASSESSMENT
38 YO M with PMHx of morbid obesity, BEA on CPAP, pAFIB (not on AC), HTN, and BL papilledema attributed to pseudotumor cerebri who presented initially to [facility] on 2/24 with SOB and BL LE edema. Found with URI outpatient with progressive SOB, and concern for noted for MFPNA on imaging. Course progressed with AHRF with worsening O2 demand, respiratory distress, secretions, and somnolence requiring intubation (difficult with success after 6 attempts) in ED and admission to [facility] MICU. While in MICU, patient noted with increasing O2 demand with no improvement despite optimal vent management. Concern noted for progressive ARDS, unable to prone given morbid obesity, and ultimately cannulated for vvECMO on 2/25 and transferred to [facility] for further management on 2/26. While at [facility], tx with diuresis and ABX, and ultimately decannulated and s/p 60XLTCP trach and PEG on 3/7. Patient ultimately transferred to RCU on 3/11 and course complicated by recurrent high grade fevers and found with fungemia.    NEUROLOGY  # Hx of Pseudotumor Cerebri   - s/p LP in 2024 with high opening pressure  - s/p MRI 2024 with possible pituitary mass but unclear because of pressure effect from pseudotumor cerebri causing partially empty sella.  - Prolactin elevated   - ACTH low but recieved steroids during admission   - FT4 low normal and TSH normal, but given FT4 low normal TSH should be higher   - Discuss endocrine consult for inhouse work up vs outpatient.     # Sedation   - Weaned off sedation in ICU   - Nimbex turned off 2/27   - Prop turned off 3/9   - Precedex turned off and catapres started 3/11  - Continue on catapres 0.2 TID (decreased 3/25) - taper    # Insomnia   - Patient worked night shift x 15 years   - Trouble sleeping at night leading to day time sleepiness and poor participation with PT  - Continue on Seroquel 25 QHS (increased 3/18)   - Continue on melatonin   - Continue on neurontin as below    # BL LE neuropathy   - CIPN concern   - Neurontin increased to 300mg Q8H with relief   - added Percocet Q4H for moderate pain (3/25) and continue on Dilaudid Q6H for severe pain   - Pain mgt called to consult - with recs for tylenol ATC, NEurontin increased to 400mg q6, Oxycodone 5 q4h prn , Dilaudid for BREAKTHROUGH pain only, Lidocaine patches on each leg, ice prn to feet  - Some improvement in pain - PT consult for TENS,   - osycodone inc to 10mg as pt with no relief /buttocks area indurated /tender CT ordered     CARDIOVASCULAR  # HTN   - HTN noted in ICU requiring cardene and esmolol GTTs   - Lisinopril dc'ed to increase catapres   - Continue on norvasc 10 and catapres 0.3 TID   - Monitor BP     # AFIB   - Hx of pAFIB   - No RVR episodes or pAFIB episodes in ICU   - No rate control medications needed  - Monitor on telemetry     # RV dilation second to PHTN   - POCUS on arrival to [facility] with RVE concern   - TTE 10/2024 with EF 55-60 with normal LVSF, moderate LVH and normal RVSF and size with no concern for pHTN   - TTE on 2/25 at  with EF 61 and normal LVSF, but enlarged RV size with reduced RVSF, mild TR, mild PHTN with PASP 49, and dilated IVC to 3.4cn, but normal TAPSE 2.1.  - Continue on aggressive diuresis in ICU    - TTE 3/11 with RVSF reduced with TAPSE 1.6. IVC improved to 2.12cm.   - Continue on lasix 40 PO BID   - Monitor IO    RESPIRATORY  # ARDS second to MFPNA   - Hx of BEA on CPAP presented to  post URI with SOB and found with AHRF with progression to ARDS second to post viral MFPNA   - Intubated 2/25   - Cannulated for vvECMO 2/26   - s/p 60XLTCP tracheostomy 3/7   - Decannulated 3/7   - CTSx removed tracheostomy and ECMO sutures on 3/17   - Continue on TC QD with PMV as able with strong phonation  - Continue on PS 18/10/40 QHS given OHS   - Continue on nebs and HTS   -  trials continue and now trialing overnight with BIPAP for OHS (10/8/12)  - trend VBG QD  - Decannulated 3/28     GI  # Dysphagia   - s/p PEG 3/7   - Attempted green dye on 3/13 and failed  - Continue on TF  - Continue on miralax and senna  - RPT green dye passed 3/19  - 3/20 Passed FEEST - on soft bite sized with thin liquids     # Mild transaminitis   - LFTs elevated in ICU with TBILI elevated likely from ECMO hemolysis   - US ABD with hepatic steatosis   - Trend LFTs      RENAL  # ELLA   - ELLA likely from cardiorenal with RVE   - Diuresed in ICU and improved   - Monitor renal function and UOP     # Hypernatremia   - Hypernatremia with fever spikes   - Fever curve improved and attempting to wean FWF   - Continue on  Q6H (decreased 3/19)    INFECTIOUS DISEASE  # Recurrent fevers with fungemia from unknown source   - Recurrent fevers post ECMO decannulation thought initially to be cytokine surge   - BCx, SCx, UCx, RVP and MRSA RPT on 3/12 negative   - Completed zosyn (3/12-3/18) empirically with improved WBC , however recurrent fevers noted.   - RPT SCx, UA, CXR and RVP negative  - TTE 3/17 with no IE  - PanCT 3/17 with PNA and persistent panniculitis   - BCx 3/15 and 3/16 with candida albicans.   - Concern for possible source from panniculitis   - Continue on caspo (3/17 - ) and RPT BCx 3/18 negative  - ID following  - plan for 2 week course after negative BCx to 4/2  - 3/20 Bcx- NGTD    # MFPNA and abdominal pannus cellulitis   - Presented to  post URI with SOB and found with AHRF with progression to ARDS second to post viral MFPNA   - RVP, legionella, strept, BAL, BCx, UCx, HIV, PCP, and adenovirus PCR negative   - Initially started on multiple agents in ICU and ultimatelty completed zosyn (2/26-3/9) ZMAX (2/25-2/26) and vanco (2/26-3/1)     # Penile discharge   - GC/ chlamydia negative   - HIV negative   - Monitor for now    # PPX   - MRSA PCR positive and completed bactroban (2/26-3/3)     HEME   # Anemia likely second to ECMO circuit vs AOCD   - HH low in ICU second to ECMO circuit   - HH dropping again today however no bleeding noted   - Microcytic and hemochromic, sent anemia panel 3/19  - Trend HH     VASCULAR   # R IJ and BL LE DVTs   - Post ECMO dopplers on 3/9 negative for BL UE/ LE DVTs.   - Subsequent post ECMO dopplers performed on 3/14 and noted with acute, non-occlusive deep vein thrombosis noted within the right internal jugular vein. acute, occlusive deep vein thromboses noted within the right and left peroneal veins at both mid calves, and age-indeterminate, occlusive deep vein thrombosis visualized within the left gastrocnemius vein at the proximal calf.     - Continue on argatroban GTT and change to eliquis     PODIATRY   # BL plantar feet blisters likely pressors induced  - Seen by podiatry and blisters lanced on 3/4 and again on 3/18  - Continue on local wound care per podiatry   - Podiatry following  - OK for WBAT will fu with PT for offloading shoe if appropriate  - Pain mgt consult   - Podiatry FU 3/28 done     ENDOCRINE  # Pre-DM2   - A1C 6.7  - Continue on ISS   - Monitor FS   - IF no demand then stop ISS     LINES/ TUBES  - R IJ and R FEM ECMO (2/26-3/7)     SKIN  # Pannus canndial intertrigo   # Sacrum/ buttock MAD  - Continue on clotrimazole cream   - WOC appreciated   -Seen by WC pt noted to have oozing from Buttock wound surgicel placed by wound care On follow up no further bleeding  - Wound care placed orders   - No bleeding     ETHICS/ GOC    - FULL CODE     DISPO - PT following  Seen by PMR for acute rehab per recs

## 2025-03-30 LAB
ANION GAP SERPL CALC-SCNC: 14 MMOL/L — SIGNIFICANT CHANGE UP (ref 7–14)
BASOPHILS # BLD AUTO: 0.03 K/UL — SIGNIFICANT CHANGE UP (ref 0–0.2)
BASOPHILS NFR BLD AUTO: 0.2 % — SIGNIFICANT CHANGE UP (ref 0–2)
BUN SERPL-MCNC: 6 MG/DL — LOW (ref 7–23)
CALCIUM SERPL-MCNC: 8.8 MG/DL — SIGNIFICANT CHANGE UP (ref 8.4–10.5)
CHLORIDE SERPL-SCNC: 89 MMOL/L — LOW (ref 98–107)
CO2 SERPL-SCNC: 28 MMOL/L — SIGNIFICANT CHANGE UP (ref 22–31)
CREAT SERPL-MCNC: 0.81 MG/DL — SIGNIFICANT CHANGE UP (ref 0.5–1.3)
EGFR: 116 ML/MIN/1.73M2 — SIGNIFICANT CHANGE UP
EGFR: 116 ML/MIN/1.73M2 — SIGNIFICANT CHANGE UP
EOSINOPHIL # BLD AUTO: 0.04 K/UL — SIGNIFICANT CHANGE UP (ref 0–0.5)
EOSINOPHIL NFR BLD AUTO: 0.3 % — SIGNIFICANT CHANGE UP (ref 0–6)
GLUCOSE BLDC GLUCOMTR-MCNC: 100 MG/DL — HIGH (ref 70–99)
GLUCOSE BLDC GLUCOMTR-MCNC: 107 MG/DL — HIGH (ref 70–99)
GLUCOSE BLDC GLUCOMTR-MCNC: 123 MG/DL — HIGH (ref 70–99)
GLUCOSE BLDC GLUCOMTR-MCNC: 96 MG/DL — SIGNIFICANT CHANGE UP (ref 70–99)
GLUCOSE BLDC GLUCOMTR-MCNC: 99 MG/DL — SIGNIFICANT CHANGE UP (ref 70–99)
GLUCOSE SERPL-MCNC: 95 MG/DL — SIGNIFICANT CHANGE UP (ref 70–99)
HCT VFR BLD CALC: 24.9 % — LOW (ref 39–50)
HGB BLD-MCNC: 8.1 G/DL — LOW (ref 13–17)
IANC: 11.6 K/UL — HIGH (ref 1.8–7.4)
IMM GRANULOCYTES NFR BLD AUTO: 0.8 % — SIGNIFICANT CHANGE UP (ref 0–0.9)
LYMPHOCYTES # BLD AUTO: 13.7 % — SIGNIFICANT CHANGE UP (ref 13–44)
LYMPHOCYTES # BLD AUTO: 2.13 K/UL — SIGNIFICANT CHANGE UP (ref 1–3.3)
MAGNESIUM SERPL-MCNC: 1.4 MG/DL — LOW (ref 1.6–2.6)
MCHC RBC-ENTMCNC: 28.4 PG — SIGNIFICANT CHANGE UP (ref 27–34)
MCHC RBC-ENTMCNC: 32.5 G/DL — SIGNIFICANT CHANGE UP (ref 32–36)
MCV RBC AUTO: 87.4 FL — SIGNIFICANT CHANGE UP (ref 80–100)
MONOCYTES # BLD AUTO: 1.65 K/UL — HIGH (ref 0–0.9)
MONOCYTES NFR BLD AUTO: 10.6 % — SIGNIFICANT CHANGE UP (ref 2–14)
NEUTROPHILS # BLD AUTO: 11.6 K/UL — HIGH (ref 1.8–7.4)
NEUTROPHILS NFR BLD AUTO: 74.4 % — SIGNIFICANT CHANGE UP (ref 43–77)
NRBC # BLD AUTO: 0 K/UL — SIGNIFICANT CHANGE UP (ref 0–0)
NRBC # FLD: 0 K/UL — SIGNIFICANT CHANGE UP (ref 0–0)
NRBC BLD AUTO-RTO: 0 /100 WBCS — SIGNIFICANT CHANGE UP (ref 0–0)
PHOSPHATE SERPL-MCNC: 4.7 MG/DL — HIGH (ref 2.5–4.5)
PLATELET # BLD AUTO: 392 K/UL — SIGNIFICANT CHANGE UP (ref 150–400)
POTASSIUM SERPL-MCNC: 4 MMOL/L — SIGNIFICANT CHANGE UP (ref 3.5–5.3)
POTASSIUM SERPL-SCNC: 4 MMOL/L — SIGNIFICANT CHANGE UP (ref 3.5–5.3)
RBC # BLD: 2.85 M/UL — LOW (ref 4.2–5.8)
RBC # FLD: 24.2 % — HIGH (ref 10.3–14.5)
SODIUM SERPL-SCNC: 131 MMOL/L — LOW (ref 135–145)
WBC # BLD: 15.58 K/UL — HIGH (ref 3.8–10.5)
WBC # FLD AUTO: 15.58 K/UL — HIGH (ref 3.8–10.5)

## 2025-03-30 PROCEDURE — 99233 SBSQ HOSP IP/OBS HIGH 50: CPT

## 2025-03-30 PROCEDURE — 72148 MRI LUMBAR SPINE W/O DYE: CPT | Mod: 26

## 2025-03-30 PROCEDURE — 71045 X-RAY EXAM CHEST 1 VIEW: CPT | Mod: 26

## 2025-03-30 RX ORDER — PIPERACILLIN-TAZO-DEXTROSE,ISO 2.25G/50ML
3.38 IV SOLUTION, PIGGYBACK PREMIX FROZEN(ML) INTRAVENOUS ONCE
Refills: 0 | Status: COMPLETED | OUTPATIENT
Start: 2025-03-30 | End: 2025-03-30

## 2025-03-30 RX ORDER — VANCOMYCIN HCL IN 5 % DEXTROSE 1.5G/250ML
2000 PLASTIC BAG, INJECTION (ML) INTRAVENOUS EVERY 12 HOURS
Refills: 0 | Status: DISCONTINUED | OUTPATIENT
Start: 2025-03-30 | End: 2025-03-30

## 2025-03-30 RX ORDER — VANCOMYCIN HCL IN 5 % DEXTROSE 1.5G/250ML
1500 PLASTIC BAG, INJECTION (ML) INTRAVENOUS ONCE
Refills: 0 | Status: COMPLETED | OUTPATIENT
Start: 2025-03-30 | End: 2025-03-31

## 2025-03-30 RX ORDER — ACETAMINOPHEN 500 MG/5ML
1000 LIQUID (ML) ORAL ONCE
Refills: 0 | Status: COMPLETED | OUTPATIENT
Start: 2025-03-30 | End: 2025-03-30

## 2025-03-30 RX ORDER — MAGNESIUM SULFATE 500 MG/ML
2 SYRINGE (ML) INJECTION ONCE
Refills: 0 | Status: COMPLETED | OUTPATIENT
Start: 2025-03-30 | End: 2025-03-30

## 2025-03-30 RX ORDER — PIPERACILLIN-TAZO-DEXTROSE,ISO 2.25G/50ML
3.38 IV SOLUTION, PIGGYBACK PREMIX FROZEN(ML) INTRAVENOUS EVERY 8 HOURS
Refills: 0 | Status: COMPLETED | OUTPATIENT
Start: 2025-03-31 | End: 2025-04-07

## 2025-03-30 RX ORDER — INSULIN LISPRO 100 U/ML
INJECTION, SOLUTION INTRAVENOUS; SUBCUTANEOUS AT BEDTIME
Refills: 0 | Status: DISCONTINUED | OUTPATIENT
Start: 2025-03-30 | End: 2025-04-25

## 2025-03-30 RX ORDER — PIPERACILLIN-TAZO-DEXTROSE,ISO 2.25G/50ML
3.38 IV SOLUTION, PIGGYBACK PREMIX FROZEN(ML) INTRAVENOUS ONCE
Refills: 0 | Status: COMPLETED | OUTPATIENT
Start: 2025-03-31 | End: 2025-03-31

## 2025-03-30 RX ORDER — DEXTROSE 50 % IN WATER 50 %
15 SYRINGE (ML) INTRAVENOUS ONCE
Refills: 0 | Status: DISCONTINUED | OUTPATIENT
Start: 2025-03-30 | End: 2025-04-25

## 2025-03-30 RX ADMIN — APIXABAN 5 MILLIGRAM(S): 2.5 TABLET, FILM COATED ORAL at 16:06

## 2025-03-30 RX ADMIN — Medication 1 TABLET(S): at 11:22

## 2025-03-30 RX ADMIN — IPRATROPIUM BROMIDE AND ALBUTEROL SULFATE 3 MILLILITER(S): .5; 2.5 SOLUTION RESPIRATORY (INHALATION) at 07:50

## 2025-03-30 RX ADMIN — Medication 0.5 MILLIGRAM(S): at 02:00

## 2025-03-30 RX ADMIN — Medication 650 MILLIGRAM(S): at 13:19

## 2025-03-30 RX ADMIN — FUROSEMIDE 40 MILLIGRAM(S): 10 INJECTION INTRAMUSCULAR; INTRAVENOUS at 06:49

## 2025-03-30 RX ADMIN — LIDOCAINE HYDROCHLORIDE 1 PATCH: 20 JELLY TOPICAL at 23:06

## 2025-03-30 RX ADMIN — APIXABAN 5 MILLIGRAM(S): 2.5 TABLET, FILM COATED ORAL at 06:34

## 2025-03-30 RX ADMIN — Medication 0.5 MILLIGRAM(S): at 01:24

## 2025-03-30 RX ADMIN — LIDOCAINE HYDROCHLORIDE 1 PATCH: 20 JELLY TOPICAL at 11:21

## 2025-03-30 RX ADMIN — Medication 650 MILLIGRAM(S): at 06:49

## 2025-03-30 RX ADMIN — Medication 25 GRAM(S): at 17:18

## 2025-03-30 RX ADMIN — IPRATROPIUM BROMIDE AND ALBUTEROL SULFATE 3 MILLILITER(S): .5; 2.5 SOLUTION RESPIRATORY (INHALATION) at 21:24

## 2025-03-30 RX ADMIN — AMLODIPINE BESYLATE 10 MILLIGRAM(S): 10 TABLET ORAL at 06:50

## 2025-03-30 RX ADMIN — OXYCODONE HYDROCHLORIDE 10 MILLIGRAM(S): 30 TABLET ORAL at 21:35

## 2025-03-30 RX ADMIN — Medication 3 MILLIGRAM(S): at 22:03

## 2025-03-30 RX ADMIN — Medication 0.2 MILLIGRAM(S): at 22:03

## 2025-03-30 RX ADMIN — OXYCODONE HYDROCHLORIDE 10 MILLIGRAM(S): 30 TABLET ORAL at 09:18

## 2025-03-30 RX ADMIN — Medication 15 MILLILITER(S): at 06:51

## 2025-03-30 RX ADMIN — OXYCODONE HYDROCHLORIDE 10 MILLIGRAM(S): 30 TABLET ORAL at 20:35

## 2025-03-30 RX ADMIN — POLYETHYLENE GLYCOL 3350 17 GRAM(S): 17 POWDER, FOR SOLUTION ORAL at 11:23

## 2025-03-30 RX ADMIN — Medication 0.5 MILLIGRAM(S): at 16:05

## 2025-03-30 RX ADMIN — OXYCODONE HYDROCHLORIDE 10 MILLIGRAM(S): 30 TABLET ORAL at 09:53

## 2025-03-30 RX ADMIN — CASPOFUNGIN ACETATE 260 MILLIGRAM(S): 5 INJECTION, POWDER, LYOPHILIZED, FOR SOLUTION INTRAVENOUS at 05:00

## 2025-03-30 RX ADMIN — OXYCODONE HYDROCHLORIDE 10 MILLIGRAM(S): 30 TABLET ORAL at 04:11

## 2025-03-30 RX ADMIN — IPRATROPIUM BROMIDE AND ALBUTEROL SULFATE 3 MILLILITER(S): .5; 2.5 SOLUTION RESPIRATORY (INHALATION) at 03:54

## 2025-03-30 RX ADMIN — Medication 1 APPLICATION(S): at 06:51

## 2025-03-30 RX ADMIN — CLOTRIMAZOLE 1 APPLICATION(S): 1 CREAM TOPICAL at 06:51

## 2025-03-30 RX ADMIN — Medication 0.2 MILLIGRAM(S): at 06:49

## 2025-03-30 RX ADMIN — Medication 15 MILLILITER(S): at 16:06

## 2025-03-30 RX ADMIN — GABAPENTIN 400 MILLIGRAM(S): 400 CAPSULE ORAL at 13:17

## 2025-03-30 RX ADMIN — GABAPENTIN 400 MILLIGRAM(S): 400 CAPSULE ORAL at 06:50

## 2025-03-30 RX ADMIN — Medication 400 MILLIGRAM(S): at 23:49

## 2025-03-30 RX ADMIN — Medication 500 MILLILITER(S): at 16:00

## 2025-03-30 RX ADMIN — Medication 2 TABLET(S): at 22:03

## 2025-03-30 RX ADMIN — QUETIAPINE FUMARATE 25 MILLIGRAM(S): 25 TABLET ORAL at 22:03

## 2025-03-30 RX ADMIN — MUPIROCIN CALCIUM 1 APPLICATION(S): 20 CREAM TOPICAL at 06:52

## 2025-03-30 RX ADMIN — MUPIROCIN CALCIUM 1 APPLICATION(S): 20 CREAM TOPICAL at 16:05

## 2025-03-30 RX ADMIN — Medication 40 MILLIGRAM(S): at 06:52

## 2025-03-30 RX ADMIN — Medication 0.2 MILLIGRAM(S): at 13:18

## 2025-03-30 RX ADMIN — CLOTRIMAZOLE 1 APPLICATION(S): 1 CREAM TOPICAL at 16:06

## 2025-03-30 RX ADMIN — IPRATROPIUM BROMIDE AND ALBUTEROL SULFATE 3 MILLILITER(S): .5; 2.5 SOLUTION RESPIRATORY (INHALATION) at 15:49

## 2025-03-30 RX ADMIN — LIDOCAINE HYDROCHLORIDE 1 PATCH: 20 JELLY TOPICAL at 11:22

## 2025-03-30 RX ADMIN — SODIUM HYPOCHLORITE 1 APPLICATION(S): 0.12 SOLUTION TOPICAL at 11:23

## 2025-03-30 RX ADMIN — FUROSEMIDE 40 MILLIGRAM(S): 10 INJECTION INTRAMUSCULAR; INTRAVENOUS at 13:18

## 2025-03-30 RX ADMIN — GABAPENTIN 400 MILLIGRAM(S): 400 CAPSULE ORAL at 22:03

## 2025-03-30 RX ADMIN — OXYCODONE HYDROCHLORIDE 10 MILLIGRAM(S): 30 TABLET ORAL at 05:00

## 2025-03-30 NOTE — PROGRESS NOTE ADULT - SUBJECTIVE AND OBJECTIVE BOX
CHIEF COMPLAINT:Patient is a 39y old  Male who presents with a chief complaint of sob (02 Mar 2025 06:19)      INTERVAL EVENTS:     ROS: Seen by bedside during AM rounds     OBJECTIVE:  ICU Vital Signs Last 24 Hrs  T(C): 37.1 (30 Mar 2025 00:30), Max: 38.1 (29 Mar 2025 20:30)  T(F): 98.7 (30 Mar 2025 00:30), Max: 100.6 (29 Mar 2025 20:30)  HR: 120 (30 Mar 2025 03:40) (99 - 121)  BP: 124/72 (30 Mar 2025 00:30) (124/72 - 137/88)  BP(mean): 87 (30 Mar 2025 00:30) (74 - 87)  ABP: --  ABP(mean): --  RR: 15 (30 Mar 2025 00:30) (15 - 18)  SpO2: 98% (30 Mar 2025 03:40) (95% - 100%)    O2 Parameters below as of 30 Mar 2025 03:40  Patient On (Oxygen Delivery Method): BiPAP/CPAP              03-29 @ 07:01  -  03-30 @ 07:00  --------------------------------------------------------  IN: 1400 mL / OUT: 3900 mL / NET: -2500 mL      CAPILLARY BLOOD GLUCOSE      POCT Blood Glucose.: 99 mg/dL (30 Mar 2025 06:47)      PHYSICAL EXAM:  General:   HEENT:   Lymph Nodes:  Neck:   Respiratory:   Cardiovascular:   Abdomen:   Extremities:   Skin:   Neurological:  Psychiatry:        HOSPITAL MEDICATIONS:  MEDICATIONS  (STANDING):  acetaminophen     Tablet .. 650 milliGRAM(s) Oral every 8 hours  albuterol/ipratropium for Nebulization 3 milliLiter(s) Nebulizer every 6 hours  amLODIPine   Tablet 10 milliGRAM(s) Oral daily  apixaban 5 milliGRAM(s) Oral every 12 hours  caspofungin IVPB 70 milliGRAM(s) IV Intermittent every 24 hours  chlorhexidine 0.12% Liquid 15 milliLiter(s) Swish and Spit two times a day  chlorhexidine 2% Cloths 1 Application(s) Topical <User Schedule>  cloNIDine 0.2 milliGRAM(s) Oral every 8 hours  clotrimazole 1% Cream 1 Application(s) Topical two times a day  Dakins Solution - 1/4 Strength 1 Application(s) Topical daily  dextrose 50% Injectable 25 Gram(s) IV Push once  dextrose 50% Injectable 12.5 Gram(s) IV Push once  dextrose 50% Injectable 25 Gram(s) IV Push once  furosemide    Tablet 40 milliGRAM(s) Oral two times a day  gabapentin 400 milliGRAM(s) Oral every 8 hours  glucagon  Injectable 1 milliGRAM(s) IntraMuscular once  insulin lispro (ADMELOG) corrective regimen sliding scale   SubCutaneous three times a day before meals  insulin lispro (ADMELOG) corrective regimen sliding scale   SubCutaneous at bedtime  lidocaine   4% Patch 1 Patch Transdermal daily  lidocaine   4% Patch 1 Patch Transdermal daily  melatonin 3 milliGRAM(s) Oral at bedtime  multivitamin 1 Tablet(s) Oral daily  mupirocin 2% Ointment 1 Application(s) Topical two times a day  pantoprazole    Tablet 40 milliGRAM(s) Oral before breakfast  polyethylene glycol 3350 17 Gram(s) Oral daily  QUEtiapine 25 milliGRAM(s) Oral at bedtime  senna 2 Tablet(s) Oral at bedtime    MEDICATIONS  (PRN):  dextrose Oral Gel 15 Gram(s) Oral once PRN Blood Glucose LESS THAN 70 milliGRAM(s)/deciliter  HYDROmorphone  Injectable 0.5 milliGRAM(s) IV Push every 6 hours PRN MOd-Severe Pain (4 - 10)  oxyCODONE    IR 10 milliGRAM(s) Oral every 4 hours PRN MOD/SEVERE PAIN 4-10      LABS:                        8.5    11.81 )-----------( 408      ( 29 Mar 2025 06:11 )             23.5     03-29    136  |  93[L]  |  6[L]  ----------------------------<  87  3.8   |  30  |  0.83    Ca    9.0      29 Mar 2025 06:11  Phos  4.8     03-29  Mg     1.50     03-29        Urinalysis Basic - ( 29 Mar 2025 06:11 )    Color: x / Appearance: x / SG: x / pH: x  Gluc: 87 mg/dL / Ketone: x  / Bili: x / Urobili: x   Blood: x / Protein: x / Nitrite: x   Leuk Esterase: x / RBC: x / WBC x   Sq Epi: x / Non Sq Epi: x / Bacteria: x         CHIEF COMPLAINT:Patient is a 39y old  Male who presents with a chief complaint of sob (02 Mar 2025 06:19)      INTERVAL EVENTS:     ROS: Seen by bedside during AM rounds     OBJECTIVE:  ICU Vital Signs Last 24 Hrs  T(C): 37.1 (30 Mar 2025 00:30), Max: 38.1 (29 Mar 2025 20:30)  T(F): 98.7 (30 Mar 2025 00:30), Max: 100.6 (29 Mar 2025 20:30)  HR: 120 (30 Mar 2025 03:40) (99 - 121)  BP: 124/72 (30 Mar 2025 00:30) (124/72 - 137/88)  BP(mean): 87 (30 Mar 2025 00:30) (74 - 87)  ABP: --  ABP(mean): --  RR: 15 (30 Mar 2025 00:30) (15 - 18)  SpO2: 98% (30 Mar 2025 03:40) (95% - 100%)    O2 Parameters below as of 30 Mar 2025 03:40  Patient On (Oxygen Delivery Method): BiPAP/CPAP              03-29 @ 07:01  -  03-30 @ 07:00  --------------------------------------------------------  IN: 1400 mL / OUT: 3900 mL / NET: -2500 mL      CAPILLARY BLOOD GLUCOSE      POCT Blood Glucose.: 99 mg/dL (30 Mar 2025 06:47)      PHYSICAL EXAM:  General: NAD   Neck: trachea midline.  Cards: S1/S2, no murmurs   Pulm: CTA bilaterally. No wheezes.   Abdomen: MOrbidly obese abdomen. Soft, NTND. (+) PEG noted, site c/d/i.   Extremities: Mild b/l LE edema. Extremities warm to touch.  Neurology: AOx3. 5/5 motor strength b/l UE. 4/5 motor strength LLE. Unable to range RLE d/t weakness.   Skin: warm to touch, color appropriate for ethnicity. Refer to RN assessment for further details.          HOSPITAL MEDICATIONS:  MEDICATIONS  (STANDING):  acetaminophen     Tablet .. 650 milliGRAM(s) Oral every 8 hours  albuterol/ipratropium for Nebulization 3 milliLiter(s) Nebulizer every 6 hours  amLODIPine   Tablet 10 milliGRAM(s) Oral daily  apixaban 5 milliGRAM(s) Oral every 12 hours  caspofungin IVPB 70 milliGRAM(s) IV Intermittent every 24 hours  chlorhexidine 0.12% Liquid 15 milliLiter(s) Swish and Spit two times a day  chlorhexidine 2% Cloths 1 Application(s) Topical <User Schedule>  cloNIDine 0.2 milliGRAM(s) Oral every 8 hours  clotrimazole 1% Cream 1 Application(s) Topical two times a day  Dakins Solution - 1/4 Strength 1 Application(s) Topical daily  dextrose 50% Injectable 25 Gram(s) IV Push once  dextrose 50% Injectable 12.5 Gram(s) IV Push once  dextrose 50% Injectable 25 Gram(s) IV Push once  furosemide    Tablet 40 milliGRAM(s) Oral two times a day  gabapentin 400 milliGRAM(s) Oral every 8 hours  glucagon  Injectable 1 milliGRAM(s) IntraMuscular once  insulin lispro (ADMELOG) corrective regimen sliding scale   SubCutaneous three times a day before meals  insulin lispro (ADMELOG) corrective regimen sliding scale   SubCutaneous at bedtime  lidocaine   4% Patch 1 Patch Transdermal daily  lidocaine   4% Patch 1 Patch Transdermal daily  melatonin 3 milliGRAM(s) Oral at bedtime  multivitamin 1 Tablet(s) Oral daily  mupirocin 2% Ointment 1 Application(s) Topical two times a day  pantoprazole    Tablet 40 milliGRAM(s) Oral before breakfast  polyethylene glycol 3350 17 Gram(s) Oral daily  QUEtiapine 25 milliGRAM(s) Oral at bedtime  senna 2 Tablet(s) Oral at bedtime    MEDICATIONS  (PRN):  dextrose Oral Gel 15 Gram(s) Oral once PRN Blood Glucose LESS THAN 70 milliGRAM(s)/deciliter  HYDROmorphone  Injectable 0.5 milliGRAM(s) IV Push every 6 hours PRN MOd-Severe Pain (4 - 10)  oxyCODONE    IR 10 milliGRAM(s) Oral every 4 hours PRN MOD/SEVERE PAIN 4-10      LABS:                        8.5    11.81 )-----------( 408      ( 29 Mar 2025 06:11 )             23.5     03-29    136  |  93[L]  |  6[L]  ----------------------------<  87  3.8   |  30  |  0.83    Ca    9.0      29 Mar 2025 06:11  Phos  4.8     03-29  Mg     1.50     03-29        Urinalysis Basic - ( 29 Mar 2025 06:11 )    Color: x / Appearance: x / SG: x / pH: x  Gluc: 87 mg/dL / Ketone: x  / Bili: x / Urobili: x   Blood: x / Protein: x / Nitrite: x   Leuk Esterase: x / RBC: x / WBC x   Sq Epi: x / Non Sq Epi: x / Bacteria: x         CHIEF COMPLAINT:Patient is a 39y old  Male who presents with a chief complaint of sob (02 Mar 2025 06:19)      INTERVAL EVENTS: no acute overnight events noted.     ROS: Seen by bedside during AM rounds     OBJECTIVE:  ICU Vital Signs Last 24 Hrs  T(C): 37.1 (30 Mar 2025 00:30), Max: 38.1 (29 Mar 2025 20:30)  T(F): 98.7 (30 Mar 2025 00:30), Max: 100.6 (29 Mar 2025 20:30)  HR: 120 (30 Mar 2025 03:40) (99 - 121)  BP: 124/72 (30 Mar 2025 00:30) (124/72 - 137/88)  BP(mean): 87 (30 Mar 2025 00:30) (74 - 87)  ABP: --  ABP(mean): --  RR: 15 (30 Mar 2025 00:30) (15 - 18)  SpO2: 98% (30 Mar 2025 03:40) (95% - 100%)    O2 Parameters below as of 30 Mar 2025 03:40  Patient On (Oxygen Delivery Method): BiPAP/CPAP              03-29 @ 07:01  -  03-30 @ 07:00  --------------------------------------------------------  IN: 1400 mL / OUT: 3900 mL / NET: -2500 mL      CAPILLARY BLOOD GLUCOSE      POCT Blood Glucose.: 99 mg/dL (30 Mar 2025 06:47)      PHYSICAL EXAM:  General: NAD   Neck: trachea midline.  Cards: S1/S2, no murmurs   Pulm: CTA bilaterally. No wheezes.   Abdomen: MOrbidly obese abdomen. Soft, NTND. (+) PEG noted, site c/d/i.   Extremities: Mild b/l LE edema. Extremities warm to touch.  Neurology: AOx3. 5/5 motor strength b/l UE. 4/5 motor strength LLE. Unable to range RLE d/t weakness.   Skin: warm to touch, color appropriate for ethnicity. Refer to RN assessment for further details.          HOSPITAL MEDICATIONS:  MEDICATIONS  (STANDING):  acetaminophen     Tablet .. 650 milliGRAM(s) Oral every 8 hours  albuterol/ipratropium for Nebulization 3 milliLiter(s) Nebulizer every 6 hours  amLODIPine   Tablet 10 milliGRAM(s) Oral daily  apixaban 5 milliGRAM(s) Oral every 12 hours  caspofungin IVPB 70 milliGRAM(s) IV Intermittent every 24 hours  chlorhexidine 0.12% Liquid 15 milliLiter(s) Swish and Spit two times a day  chlorhexidine 2% Cloths 1 Application(s) Topical <User Schedule>  cloNIDine 0.2 milliGRAM(s) Oral every 8 hours  clotrimazole 1% Cream 1 Application(s) Topical two times a day  Dakins Solution - 1/4 Strength 1 Application(s) Topical daily  dextrose 50% Injectable 25 Gram(s) IV Push once  dextrose 50% Injectable 12.5 Gram(s) IV Push once  dextrose 50% Injectable 25 Gram(s) IV Push once  furosemide    Tablet 40 milliGRAM(s) Oral two times a day  gabapentin 400 milliGRAM(s) Oral every 8 hours  glucagon  Injectable 1 milliGRAM(s) IntraMuscular once  insulin lispro (ADMELOG) corrective regimen sliding scale   SubCutaneous three times a day before meals  insulin lispro (ADMELOG) corrective regimen sliding scale   SubCutaneous at bedtime  lidocaine   4% Patch 1 Patch Transdermal daily  lidocaine   4% Patch 1 Patch Transdermal daily  melatonin 3 milliGRAM(s) Oral at bedtime  multivitamin 1 Tablet(s) Oral daily  mupirocin 2% Ointment 1 Application(s) Topical two times a day  pantoprazole    Tablet 40 milliGRAM(s) Oral before breakfast  polyethylene glycol 3350 17 Gram(s) Oral daily  QUEtiapine 25 milliGRAM(s) Oral at bedtime  senna 2 Tablet(s) Oral at bedtime    MEDICATIONS  (PRN):  dextrose Oral Gel 15 Gram(s) Oral once PRN Blood Glucose LESS THAN 70 milliGRAM(s)/deciliter  HYDROmorphone  Injectable 0.5 milliGRAM(s) IV Push every 6 hours PRN MOd-Severe Pain (4 - 10)  oxyCODONE    IR 10 milliGRAM(s) Oral every 4 hours PRN MOD/SEVERE PAIN 4-10      LABS:                        8.5    11.81 )-----------( 408      ( 29 Mar 2025 06:11 )             23.5     03-29    136  |  93[L]  |  6[L]  ----------------------------<  87  3.8   |  30  |  0.83    Ca    9.0      29 Mar 2025 06:11  Phos  4.8     03-29  Mg     1.50     03-29        Urinalysis Basic - ( 29 Mar 2025 06:11 )    Color: x / Appearance: x / SG: x / pH: x  Gluc: 87 mg/dL / Ketone: x  / Bili: x / Urobili: x   Blood: x / Protein: x / Nitrite: x   Leuk Esterase: x / RBC: x / WBC x   Sq Epi: x / Non Sq Epi: x / Bacteria: x         CHIEF COMPLAINT:Patient is a 39y old  Male who presents with a chief complaint of sob (02 Mar 2025 06:19)      INTERVAL EVENTS: febrile overnight (tmax 100.8F)    ROS: Seen by bedside during AM rounds     OBJECTIVE:  ICU Vital Signs Last 24 Hrs  T(C): 37.1 (30 Mar 2025 00:30), Max: 38.1 (29 Mar 2025 20:30)  T(F): 98.7 (30 Mar 2025 00:30), Max: 100.6 (29 Mar 2025 20:30)  HR: 120 (30 Mar 2025 03:40) (99 - 121)  BP: 124/72 (30 Mar 2025 00:30) (124/72 - 137/88)  BP(mean): 87 (30 Mar 2025 00:30) (74 - 87)  ABP: --  ABP(mean): --  RR: 15 (30 Mar 2025 00:30) (15 - 18)  SpO2: 98% (30 Mar 2025 03:40) (95% - 100%)    O2 Parameters below as of 30 Mar 2025 03:40  Patient On (Oxygen Delivery Method): BiPAP/CPAP              03-29 @ 07:01  -  03-30 @ 07:00  --------------------------------------------------------  IN: 1400 mL / OUT: 3900 mL / NET: -2500 mL      CAPILLARY BLOOD GLUCOSE      POCT Blood Glucose.: 99 mg/dL (30 Mar 2025 06:47)      PHYSICAL EXAM:  General: NAD   Neck: trachea midline.  Cards: S1/S2, no murmurs   Pulm: CTA bilaterally. No wheezes.   Abdomen: MOrbidly obese abdomen. Soft, NTND. (+) PEG noted, site c/d/i.   Extremities: Mild b/l LE edema. Extremities warm to touch.  Neurology: AOx3. 5/5 motor strength b/l UE. 4/5 motor strength LLE. Unable to range RLE d/t weakness.   Skin: warm to touch, color appropriate for ethnicity. Refer to RN assessment for further details.          HOSPITAL MEDICATIONS:  MEDICATIONS  (STANDING):  acetaminophen     Tablet .. 650 milliGRAM(s) Oral every 8 hours  albuterol/ipratropium for Nebulization 3 milliLiter(s) Nebulizer every 6 hours  amLODIPine   Tablet 10 milliGRAM(s) Oral daily  apixaban 5 milliGRAM(s) Oral every 12 hours  caspofungin IVPB 70 milliGRAM(s) IV Intermittent every 24 hours  chlorhexidine 0.12% Liquid 15 milliLiter(s) Swish and Spit two times a day  chlorhexidine 2% Cloths 1 Application(s) Topical <User Schedule>  cloNIDine 0.2 milliGRAM(s) Oral every 8 hours  clotrimazole 1% Cream 1 Application(s) Topical two times a day  Dakins Solution - 1/4 Strength 1 Application(s) Topical daily  dextrose 50% Injectable 25 Gram(s) IV Push once  dextrose 50% Injectable 12.5 Gram(s) IV Push once  dextrose 50% Injectable 25 Gram(s) IV Push once  furosemide    Tablet 40 milliGRAM(s) Oral two times a day  gabapentin 400 milliGRAM(s) Oral every 8 hours  glucagon  Injectable 1 milliGRAM(s) IntraMuscular once  insulin lispro (ADMELOG) corrective regimen sliding scale   SubCutaneous three times a day before meals  insulin lispro (ADMELOG) corrective regimen sliding scale   SubCutaneous at bedtime  lidocaine   4% Patch 1 Patch Transdermal daily  lidocaine   4% Patch 1 Patch Transdermal daily  melatonin 3 milliGRAM(s) Oral at bedtime  multivitamin 1 Tablet(s) Oral daily  mupirocin 2% Ointment 1 Application(s) Topical two times a day  pantoprazole    Tablet 40 milliGRAM(s) Oral before breakfast  polyethylene glycol 3350 17 Gram(s) Oral daily  QUEtiapine 25 milliGRAM(s) Oral at bedtime  senna 2 Tablet(s) Oral at bedtime    MEDICATIONS  (PRN):  dextrose Oral Gel 15 Gram(s) Oral once PRN Blood Glucose LESS THAN 70 milliGRAM(s)/deciliter  HYDROmorphone  Injectable 0.5 milliGRAM(s) IV Push every 6 hours PRN MOd-Severe Pain (4 - 10)  oxyCODONE    IR 10 milliGRAM(s) Oral every 4 hours PRN MOD/SEVERE PAIN 4-10      LABS:                        8.5    11.81 )-----------( 408      ( 29 Mar 2025 06:11 )             23.5     03-29    136  |  93[L]  |  6[L]  ----------------------------<  87  3.8   |  30  |  0.83    Ca    9.0      29 Mar 2025 06:11  Phos  4.8     03-29  Mg     1.50     03-29        Urinalysis Basic - ( 29 Mar 2025 06:11 )    Color: x / Appearance: x / SG: x / pH: x  Gluc: 87 mg/dL / Ketone: x  / Bili: x / Urobili: x   Blood: x / Protein: x / Nitrite: x   Leuk Esterase: x / RBC: x / WBC x   Sq Epi: x / Non Sq Epi: x / Bacteria: x

## 2025-03-30 NOTE — PROGRESS NOTE ADULT - ASSESSMENT
38 YO M with PMHx of morbid obesity, BEA on CPAP, pAFIB (not on AC), HTN, and BL papilledema attributed to pseudotumor cerebri who presented initially to [facility] on 2/24 with SOB and BL LE edema. Found with URI outpatient with progressive SOB, and concern for noted for MFPNA on imaging. Course progressed with AHRF with worsening O2 demand, respiratory distress, secretions, and somnolence requiring intubation (difficult with success after 6 attempts) in ED and admission to [facility] MICU. While in MICU, patient noted with increasing O2 demand with no improvement despite optimal vent management. Concern noted for progressive ARDS, unable to prone given morbid obesity, and ultimately cannulated for vvECMO on 2/25 and transferred to [facility] for further management on 2/26. While at [facility], tx with diuresis and ABX, and ultimately decannulated and s/p 60XLTCP trach and PEG on 3/7. Patient ultimately transferred to [facility] on 3/11 and course complicated by recurrent high grade fevers and found with fungemia.    NEUROLOGY  # Hx of Pseudotumor Cerebri   - s/p LP in 2024 with high opening pressure  - s/p MRI 2024 with possible pituitary mass but unclear because of pressure effect from pseudotumor cerebri causing partially empty sella.  - Prolactin elevated   - ACTH low but recieved steroids during admission   - FT4 low normal and TSH normal, but given FT4 low normal TSH should be higher   - Discuss endocrine consult for inhouse work up vs outpatient.     # Sedation   - Weaned off sedation in ICU   - Nimbex turned off 2/27   - Prop turned off 3/9   - Precedex turned off and catapres started 3/11  - Continue on catapres 0.2 TID (decreased 3/25) - taper    # Insomnia   - Patient worked night shift x 15 years   - Trouble sleeping at night leading to day time sleepiness and poor participation with PT  - Continue on Seroquel 25 QHS (increased 3/18)   - Continue on melatonin   - Continue on neurontin as below    # BL LE neuropathy   - CIPN concern   - Neurontin increased to 300mg Q8H with relief   - added Percocet Q4H for moderate pain (3/25) and continue on Dilaudid Q6H for severe pain   - Pain mgt called to consult - with recs for tylenol ATC, NEurontin increased to 400mg q6, Oxycodone 5 q4h prn , Dilaudid for BREAKTHROUGH pain only, Lidocaine patches on each leg, ice prn to feet  - Some improvement in pain - PT consult for TENS,   - osycodone inc to 10mg as pt with no relief /buttocks area indurated /tender CT ordered     CARDIOVASCULAR  # HTN   - HTN noted in ICU requiring cardene and esmolol GTTs   - Lisinopril dc'ed to increase catapres   - Continue on norvasc 10 and catapres 0.3 TID   - Monitor BP     # AFIB   - Hx of pAFIB   - No RVR episodes or pAFIB episodes in ICU   - No rate control medications needed  - Monitor on telemetry     # RV dilation second to PHTN   - POCUS on arrival to [facility] with RVE concern   - TTE 10/2024 with EF 55-60 with normal LVSF, moderate LVH and normal RVSF and size with no concern for pHTN   - TTE on 2/25 at  with EF 61 and normal LVSF, but enlarged RV size with reduced RVSF, mild TR, mild PHTN with PASP 49, and dilated IVC to 3.4cn, but normal TAPSE 2.1.  - Continue on aggressive diuresis in ICU    - TTE 3/11 with RVSF reduced with TAPSE 1.6. IVC improved to 2.12cm.   - Continue on lasix 40 PO BID   - Monitor IO    RESPIRATORY  # ARDS second to MFPNA   - Hx of BEA on CPAP presented to  post URI with SOB and found with AHRF with progression to ARDS second to post viral MFPNA   - Intubated 2/25   - Cannulated for vvECMO 2/26   - s/p 60XLTCP tracheostomy 3/7   - Decannulated 3/7   - CTSx removed tracheostomy and ECMO sutures on 3/17   - Continue on TC QD with PMV as able with strong phonation  - Continue on PS 18/10/40 QHS given OHS   - Continue on nebs and HTS   -  trials continue and now trialing overnight with BIPAP for OHS (10/8/12)  - trend VBG QD  - Decannulated 3/28     GI  # Dysphagia   - s/p PEG 3/7   - Attempted green dye on 3/13 and failed  - Continue on TF  - Continue on miralax and senna  - RPT green dye passed 3/19  - 3/20 Passed FEEST - on soft bite sized with thin liquids     # Mild transaminitis   - LFTs elevated in ICU with TBILI elevated likely from ECMO hemolysis   - US ABD with hepatic steatosis   - Trend LFTs      RENAL  # ELLA   - ELLA likely from cardiorenal with RVE   - Diuresed in ICU and improved   - Monitor renal function and UOP     # Hypernatremia   - Hypernatremia with fever spikes   - Fever curve improved and attempting to wean FWF   - Continue on  Q6H (decreased 3/19)    INFECTIOUS DISEASE  # Recurrent fevers with fungemia from unknown source   - Recurrent fevers post ECMO decannulation thought initially to be cytokine surge   - BCx, SCx, UCx, RVP and MRSA RPT on 3/12 negative   - Completed zosyn (3/12-3/18) empirically with improved WBC , however recurrent fevers noted.   - RPT SCx, UA, CXR and RVP negative  - TTE 3/17 with no IE  - PanCT 3/17 with PNA and persistent panniculitis   - BCx 3/15 and 3/16 with candida albicans.   - Concern for possible source from panniculitis   - Continue on caspo (3/17 - ) and RPT BCx 3/18 negative  - ID following  - plan for 2 week course after negative BCx to 4/2  - 3/20 Bcx- NGTD    # MFPNA and abdominal pannus cellulitis   - Presented to  post URI with SOB and found with AHRF with progression to ARDS second to post viral MFPNA   - RVP, legionella, strept, BAL, BCx, UCx, HIV, PCP, and adenovirus PCR negative   - Initially started on multiple agents in ICU and ultimatelty completed zosyn (2/26-3/9) ZMAX (2/25-2/26) and vanco (2/26-3/1)     # Penile discharge   - GC/ chlamydia negative   - HIV negative   - Monitor for now    # PPX   - MRSA PCR positive and completed bactroban (2/26-3/3)     HEME   # Anemia likely second to ECMO circuit vs AOCD   - HH low in ICU second to ECMO circuit   - HH dropping again today however no bleeding noted   - Microcytic and hemochromic, sent anemia panel 3/19  - Trend HH     VASCULAR   # R IJ and BL LE DVTs   - Post ECMO dopplers on 3/9 negative for BL UE/ LE DVTs.   - Subsequent post ECMO dopplers performed on 3/14 and noted with acute, non-occlusive deep vein thrombosis noted within the right internal jugular vein. acute, occlusive deep vein thromboses noted within the right and left peroneal veins at both mid calves, and age-indeterminate, occlusive deep vein thrombosis visualized within the left gastrocnemius vein at the proximal calf.     - Continue on argatroban GTT and change to eliquis     PODIATRY   # BL plantar feet blisters likely pressors induced  - Seen by podiatry and blisters lanced on 3/4 and again on 3/18  - Continue on local wound care per podiatry   - Podiatry following  - OK for WBAT will fu with PT for offloading shoe if appropriate  - Pain mgt consult   - Podiatry FU 3/28 done     ENDOCRINE  # Pre-DM2   - A1C 6.7  - Continue on ISS   - Monitor FS   - IF no demand then stop ISS     LINES/ TUBES  - R IJ and R FEM ECMO (2/26-3/7)     SKIN  # Pannus canndial intertrigo   # Sacrum/ buttock MAD  - Continue on clotrimazole cream   - WOC appreciated   -Seen by WC pt noted to have oozing from Buttock wound surgicel placed by wound care On follow up no further bleeding  - Wound care placed orders   - No bleeding     ETHICS/ GOC    - FULL CODE     DISPO - PT following  Seen by PMR for acute rehab per recs      38 YO M with PMHx of morbid obesity, BEA on CPAP, pAFIB (not on AC), HTN, and BL papilledema attributed to pseudotumor cerebri who presented initially to [facility] on 2/24 with SOB and BL LE edema. Found with URI outpatient with progressive SOB, and concern for noted for MFPNA on imaging. Course progressed with AHRF with worsening O2 demand, respiratory distress, secretions, and somnolence requiring intubation (difficult with success after 6 attempts) in ED and admission to [facility] MICU. While in MICU, patient noted with increasing O2 demand with no improvement despite optimal vent management. Concern noted for progressive ARDS, unable to prone given morbid obesity, and ultimately cannulated for vvECMO on 2/25 and transferred to [facility] for further management on 2/26. While at [facility], tx with diuresis and ABX, and ultimately decannulated and s/p 60XLTCP trach and PEG on 3/7. Patient ultimately transferred to [facility] on 3/11 and course complicated by recurrent high grade fevers and found with fungemia.    NEUROLOGY  # Hx of Pseudotumor Cerebri   - s/p LP in 2024 with high opening pressure  - s/p MRI 2024 with possible pituitary mass but unclear because of pressure effect from pseudotumor cerebri causing partially empty sella.  - Prolactin elevated   - ACTH low but recieved steroids during admission   - FT4 low normal and TSH normal, but given FT4 low normal TSH should be higher   - Discuss endocrine consult for inhouse work up vs outpatient.     # Sedation   - Weaned off sedation in ICU   - Nimbex turned off 2/27   - Prop turned off 3/9   - Precedex turned off and catapres started 3/11  - Continue on catapres 0.2 TID (decreased 3/25) - taper    # Insomnia   - Patient worked night shift x 15 years   - Trouble sleeping at night leading to day time sleepiness and poor participation with PT  - Continue on Seroquel 25 QHS (increased 3/18)   - Continue on melatonin   - Continue on neurontin as below    # BL LE neuropathy   - CIPN concern   - Neurontin increased to 300mg Q8H with relief   - added Percocet Q4H for moderate pain (3/25) and continue on Dilaudid Q6H for severe pain   - Pain mgt called to consult - with recs for tylenol ATC, NEurontin increased to 400mg q6, Oxycodone 5 q4h prn , Dilaudid for BREAKTHROUGH pain only, Lidocaine patches on each leg, ice prn to feet  - Some improvement in pain - PT consult for TENS,   - osycodone inc to 10mg as pt with no relief /buttocks area indurated /tender CT ordered     CARDIOVASCULAR  # HTN   - HTN noted in ICU requiring cardene and esmolol GTTs   - Lisinopril dc'ed to increase catapres   - Continue on norvasc 10 and catapres 0.3 TID   - Monitor BP     # AFIB   - Hx of pAFIB   - No RVR episodes or pAFIB episodes in ICU   - No rate control medications needed  - Monitor on telemetry     # RV dilation second to PHTN   - POCUS on arrival to [facility] with RVE concern   - TTE 10/2024 with EF 55-60 with normal LVSF, moderate LVH and normal RVSF and size with no concern for pHTN   - TTE on 2/25 at  with EF 61 and normal LVSF, but enlarged RV size with reduced RVSF, mild TR, mild PHTN with PASP 49, and dilated IVC to 3.4cn, but normal TAPSE 2.1.  - Continue on aggressive diuresis in ICU    - TTE 3/11 with RVSF reduced with TAPSE 1.6. IVC improved to 2.12cm.   - Continue on lasix 40 PO BID   - Monitor IO    RESPIRATORY  # ARDS second to MFPNA   - Hx of BEA on CPAP presented to  post URI with SOB and found with AHRF with progression to ARDS second to post viral MFPNA   - Intubated 2/25   - Cannulated for vvECMO 2/26   - s/p 60XLTCP tracheostomy 3/7   - Decannulated 3/7   - CTSx removed tracheostomy and ECMO sutures on 3/17   - Continue on TC QD with PMV as able with strong phonation  - Continue on PS 18/10/40 QHS given OHS   - Continue on nebs and HTS   -  trials continue and now trialing overnight with BIPAP for OHS (10/8/12)  - trend VBG QD  - Decannulated 3/28     GI  # Dysphagia   - s/p PEG 3/7   - Attempted green dye on 3/13 and failed  - Continue on TF  - Continue on miralax and senna  - RPT green dye passed 3/19  - 3/20 Passed FEEST - on soft bite sized with thin liquids     # Mild transaminitis   - LFTs elevated in ICU with TBILI elevated likely from ECMO hemolysis   - US ABD with hepatic steatosis   - Trend LFTs      RENAL  # ELLA   - ELLA likely from cardiorenal with RVE   - Diuresed in ICU and improved   - Monitor renal function and UOP     # Hypernatremia   - Hypernatremia with fever spikes   - Fever curve improved and attempting to wean FWF   - Continue on  Q6H (decreased 3/19)    INFECTIOUS DISEASE  # Recurrent fevers with fungemia from unknown source   - Recurrent fevers post ECMO decannulation thought initially to be cytokine surge   - BCx, SCx, UCx, RVP and MRSA RPT on 3/12 negative   - Completed zosyn (3/12-3/18) empirically with improved WBC , however recurrent fevers noted.   - RPT SCx, UA, CXR and RVP negative  - TTE 3/17 with no IE  - PanCT 3/17 with PNA and persistent panniculitis   - BCx 3/15 and 3/16 with candida albicans.   - Concern for possible source from panniculitis   - Continue on caspo (3/17 - ) and RPT BCx 3/18 negative  - ID following  - plan for 2 week course after negative BCx to 4/2  - 3/20 Bcx- NGTD    # MFPNA and abdominal pannus cellulitis   - Presented to  post URI with SOB and found with AHRF with progression to ARDS second to post viral MFPNA   - RVP, legionella, strept, BAL, BCx, UCx, HIV, PCP, and adenovirus PCR negative   - Initially started on multiple agents in ICU and ultimatelty completed zosyn (2/26-3/9) ZMAX (2/25-2/26) and vanco (2/26-3/1)     # R/o Sacral OM  - Noted with hard indurated sacral wound by Wound Care Team  - CT Pelvis (3/29) revaeling superficial skin thickening overlying the sacrum with underlying edema of the subcutaneous fat, in keeping with the clinical history of sacral wound. No evidence of underlying tract or drainable fluid collection.    # Penile discharge   - GC/ chlamydia negative   - HIV negative   - Monitor for now    # PPX   - MRSA PCR positive and completed bactroban (2/26-3/3)     HEME   # Anemia likely second to ECMO circuit vs AOCD   - HH low in ICU second to ECMO circuit   - HH dropping again today however no bleeding noted   - Microcytic and hemochromic, sent anemia panel 3/19  - Trend HH     VASCULAR   # R IJ and BL LE DVTs   - Post ECMO dopplers on 3/9 negative for BL UE/ LE DVTs.   - Subsequent post ECMO dopplers performed on 3/14 and noted with acute, non-occlusive deep vein thrombosis noted within the right internal jugular vein. acute, occlusive deep vein thromboses noted within the right and left peroneal veins at both mid calves, and age-indeterminate, occlusive deep vein thrombosis visualized within the left gastrocnemius vein at the proximal calf.     - Continue on argatroban GTT and change to eliquis     PODIATRY   # BL plantar feet blisters likely pressors induced  - Seen by podiatry and blisters lanced on 3/4 and again on 3/18  - Continue on local wound care per podiatry   - Podiatry following  - OK for WBAT will fu with PT for offloading shoe if appropriate  - Pain mgt consult   - Podiatry FU 3/28 done     ENDOCRINE  # Pre-DM2   - A1C 6.7  - Continue on ISS   - Monitor FS   - IF no demand then stop ISS     LINES/ TUBES  - R IJ and R FEM ECMO (2/26-3/7)     SKIN  # Pannus canndial intertrigo   # Sacrum/ buttock MAD  - Continue on clotrimazole cream   - WOC appreciated   -Seen by WC pt noted to have oozing from Buttock wound surgicel placed by wound care On follow up no further bleeding  - Wound care placed orders   - No bleeding     ETHICS/ GOC    - FULL CODE     DISPO - PT following  Seen by PMR for acute rehab per recs

## 2025-03-30 NOTE — PROGRESS NOTE ADULT - NS ATTEND AMEND GEN_ALL_CORE FT
Pt is a 39M with MHx obesity (Class III, BMI 69), pAfib (no AC), HTN, BEA (dz'ed 2019, AHI 34 non-compliant on CPAP set at 10), and history of b/l papilledema attributed to pseudotumor cerebri initially presenting as a transfer [facility] on 2/26 for acute hypoxemic respiratory failure s/p ETT intubation/MV with progression to refractory ARDS s/p initiation of vv-ECMO support (2/26-3/7) with failure to wean from ventilator s/p tracheostomy placement, further found to have RV failure s/p aggressive diuresis and PNA followed by severe sepsis 2/2 panniculitis c/b candida albicans fungemia now transferred to [facility] for further medical management.     Pt now awake and alert, spontaneously communicative and at mental status baseline. Continues to have severe functional debility likely 2/2 prolonged critical illness polyneuropathy but is progressing well. Now able to transfer via [place] to chair daily and is independent of UE and fine motor function. Off all sedation. Weaning of catapres initiated 2/25 (decreased to 0.2mg TID.) Weaning pain control, pt remains dependent on Dilaudid prn for LE neuropathic pain. PO regimen added and gabapentin increased without benefit. Pain mgmt team consulted, recs appreciated. Yesterday there was concern for worsening coolness of toes i/s/o severe pain. Podiatry consulted, possible worsening of right 5th digit that may require podiatric sx. Will CTM for now.      Pt with acute combined hypoxemic and hypercapnic respiratory failure with failure to wean from ventilator s/p tracheostomy placement. Now tolerating TC QD, PSV (18/10) qhs. Now on TC trial, using oral interface for qhs Bilevel support. Airway clearance therapy in place. Wean O2 supplementation for goal O2 saturation 90-95%. Aspiration precautions and oral hygiene in place. Decannulated at bedside 3/28.     Pt further presenting with acute on chronic hypercapnia and will require Bilevel after discharge for chronic use 2/2 chronic hypercapnic respiratory failure from hypoventilation and obesity hypoventilation syndrome (BMI 69.) Pt has exhibited an inability to maintain appropriate ventilation with persistent hypercapnia without Bilevel support qhs. Pt has high CO2 levels (PaCO2 on ABG >52.) PaCO2 done immediately upon awakening while breathing his prescribed FiO2 shows the patient's PaCO2 worsened >7mmHg compared to initial ABG. Patient's OHS requires ventilatory support. Due to the patient's severe disease state and severe dyspnea, Bilevel is necessary. Due to the patient's decreased ventilatory drive and high CO2 levels, without Bilevel support patient will experience rapid clinical deterioration, which will lead to serious medical harm. Acute on chronic hypercapnic respiratory failure due to patient (BMI 69) obesity, which is causing restriction of the thoracic cage not allowing for proper gas exchange.     Pt initially p/w RV failure 2/2 pulmHTN now s/p aggressive diuresis with improvement. Now transitioned to PO diuretics with goal to maintain euvolemia. Acetazolamide added today. AFib well controlled conservatively, will continue to monitor on telemetry. TTE 2/25 with new RVE, 3/11 with RVSD. DVT noted on repeat duplex of the right IJ and bilateral peroneal veins. Will c/w full A/C on Eliquis for DVT and AFib.     Pt with oropharyngeal dysphagia s/p PEG placement (3/7) now transitioned to PO diet (3/20) via FEEST. Will continue to monitor on soft diet. Bowel regimen in place prn. PPI ppx. Transaminitis improving.     Pt further found to have severe sepsis 2/ candida albicans fungemia possibly from panniculitis (3/15, cleared 3/18) now on caspofungin with plan to complete 14 day course from clearance. TTE (-) endocarditis. Wound care team following. ID recs appreciated. Further found to have worsening sacral ulceration with induration, CT A/P performed overnight without evidence of underlying tract or drainable fluid collection.    Dispo pending medical optimization. Pt full code. DVT ppx in place. Palliative consulted, recs appreciated. Will continue to update family and discuss plan of care throughout hospitalization.

## 2025-03-31 ENCOUNTER — APPOINTMENT (OUTPATIENT)
Dept: FAMILY MEDICINE | Facility: CLINIC | Age: 40
End: 2025-03-31

## 2025-03-31 LAB
ALBUMIN SERPL ELPH-MCNC: 2.7 G/DL — LOW (ref 3.3–5)
ALP SERPL-CCNC: 57 U/L — SIGNIFICANT CHANGE UP (ref 40–120)
ALT FLD-CCNC: 13 U/L — SIGNIFICANT CHANGE UP (ref 4–41)
ANION GAP SERPL CALC-SCNC: 14 MMOL/L — SIGNIFICANT CHANGE UP (ref 7–14)
ANISOCYTOSIS BLD QL: SIGNIFICANT CHANGE UP
APPEARANCE UR: ABNORMAL
APPEARANCE UR: CLEAR — SIGNIFICANT CHANGE UP
AST SERPL-CCNC: 15 U/L — SIGNIFICANT CHANGE UP (ref 4–40)
BACTERIA # UR AUTO: ABNORMAL /HPF
BASOPHILS # BLD AUTO: 0 K/UL — SIGNIFICANT CHANGE UP (ref 0–0.2)
BASOPHILS NFR BLD AUTO: 0 % — SIGNIFICANT CHANGE UP (ref 0–2)
BILIRUB SERPL-MCNC: 0.7 MG/DL — SIGNIFICANT CHANGE UP (ref 0.2–1.2)
BILIRUB UR-MCNC: NEGATIVE — SIGNIFICANT CHANGE UP
BILIRUB UR-MCNC: NEGATIVE — SIGNIFICANT CHANGE UP
BUN SERPL-MCNC: 7 MG/DL — SIGNIFICANT CHANGE UP (ref 7–23)
CALCIUM SERPL-MCNC: 8.9 MG/DL — SIGNIFICANT CHANGE UP (ref 8.4–10.5)
CAST: 2 /LPF — SIGNIFICANT CHANGE UP (ref 0–4)
CHLORIDE SERPL-SCNC: 88 MMOL/L — LOW (ref 98–107)
CO2 SERPL-SCNC: 29 MMOL/L — SIGNIFICANT CHANGE UP (ref 22–31)
COD CRY URNS QL: PRESENT
COLOR SPEC: YELLOW — SIGNIFICANT CHANGE UP
COLOR SPEC: YELLOW — SIGNIFICANT CHANGE UP
CREAT SERPL-MCNC: 0.85 MG/DL — SIGNIFICANT CHANGE UP (ref 0.5–1.3)
DIFF PNL FLD: NEGATIVE — SIGNIFICANT CHANGE UP
DIFF PNL FLD: NEGATIVE — SIGNIFICANT CHANGE UP
EGFR: 115 ML/MIN/1.73M2 — SIGNIFICANT CHANGE UP
EGFR: 115 ML/MIN/1.73M2 — SIGNIFICANT CHANGE UP
EOSINOPHIL # BLD AUTO: 0 K/UL — SIGNIFICANT CHANGE UP (ref 0–0.5)
EOSINOPHIL NFR BLD AUTO: 0 % — SIGNIFICANT CHANGE UP (ref 0–6)
FLUAV AG NPH QL: SIGNIFICANT CHANGE UP
FLUBV AG NPH QL: SIGNIFICANT CHANGE UP
GLUCOSE BLDC GLUCOMTR-MCNC: 105 MG/DL — HIGH (ref 70–99)
GLUCOSE BLDC GLUCOMTR-MCNC: 114 MG/DL — HIGH (ref 70–99)
GLUCOSE BLDC GLUCOMTR-MCNC: 123 MG/DL — HIGH (ref 70–99)
GLUCOSE BLDC GLUCOMTR-MCNC: 125 MG/DL — HIGH (ref 70–99)
GLUCOSE SERPL-MCNC: 108 MG/DL — HIGH (ref 70–99)
GLUCOSE UR QL: NEGATIVE MG/DL — SIGNIFICANT CHANGE UP
GLUCOSE UR QL: NEGATIVE MG/DL — SIGNIFICANT CHANGE UP
HCT VFR BLD CALC: 29 % — LOW (ref 39–50)
HGB BLD-MCNC: 8.8 G/DL — LOW (ref 13–17)
IANC: 13.13 K/UL — HIGH (ref 1.8–7.4)
KETONES UR-MCNC: 15 MG/DL
KETONES UR-MCNC: 15 MG/DL
LACTATE BLDV-MCNC: 1.3 MMOL/L — SIGNIFICANT CHANGE UP (ref 0.5–2)
LEUKOCYTE ESTERASE UR-ACNC: ABNORMAL
LEUKOCYTE ESTERASE UR-ACNC: ABNORMAL
LYMPHOCYTES # BLD AUTO: 0.84 K/UL — LOW (ref 1–3.3)
LYMPHOCYTES # BLD AUTO: 5.1 % — LOW (ref 13–44)
MANUAL SMEAR VERIFICATION: SIGNIFICANT CHANGE UP
MCHC RBC-ENTMCNC: 25.2 PG — LOW (ref 27–34)
MCHC RBC-ENTMCNC: 30.3 G/DL — LOW (ref 32–36)
MCV RBC AUTO: 83.1 FL — SIGNIFICANT CHANGE UP (ref 80–100)
MONOCYTES # BLD AUTO: 1.85 K/UL — HIGH (ref 0–0.9)
MONOCYTES NFR BLD AUTO: 11.2 % — SIGNIFICANT CHANGE UP (ref 2–14)
NEUTROPHILS # BLD AUTO: 13.32 K/UL — HIGH (ref 1.8–7.4)
NEUTROPHILS NFR BLD AUTO: 80.6 % — HIGH (ref 43–77)
NITRITE UR-MCNC: NEGATIVE — SIGNIFICANT CHANGE UP
NITRITE UR-MCNC: NEGATIVE — SIGNIFICANT CHANGE UP
PH UR: 6.5 — SIGNIFICANT CHANGE UP (ref 5–8)
PH UR: 7 — SIGNIFICANT CHANGE UP (ref 5–8)
PLAT MORPH BLD: NORMAL — SIGNIFICANT CHANGE UP
PLATELET # BLD AUTO: 402 K/UL — HIGH (ref 150–400)
PLATELET COUNT - ESTIMATE: NORMAL — SIGNIFICANT CHANGE UP
POLYCHROMASIA BLD QL SMEAR: SLIGHT — SIGNIFICANT CHANGE UP
POTASSIUM SERPL-MCNC: 4 MMOL/L — SIGNIFICANT CHANGE UP (ref 3.5–5.3)
POTASSIUM SERPL-SCNC: 4 MMOL/L — SIGNIFICANT CHANGE UP (ref 3.5–5.3)
PROT SERPL-MCNC: 7.5 G/DL — SIGNIFICANT CHANGE UP (ref 6–8.3)
PROT UR-MCNC: 30 MG/DL
PROT UR-MCNC: 30 MG/DL
RBC # BLD: 3.49 M/UL — LOW (ref 4.2–5.8)
RBC # FLD: 22.8 % — HIGH (ref 10.3–14.5)
RBC BLD AUTO: ABNORMAL
RBC CASTS # UR COMP ASSIST: 5 /HPF — HIGH (ref 0–4)
REVIEW: SIGNIFICANT CHANGE UP
RSV RNA NPH QL NAA+NON-PROBE: SIGNIFICANT CHANGE UP
SARS-COV-2 RNA SPEC QL NAA+PROBE: SIGNIFICANT CHANGE UP
SMUDGE CELLS # BLD: PRESENT — SIGNIFICANT CHANGE UP
SODIUM SERPL-SCNC: 131 MMOL/L — LOW (ref 135–145)
SOURCE RESPIRATORY: SIGNIFICANT CHANGE UP
SP GR SPEC: 1.01 — SIGNIFICANT CHANGE UP (ref 1–1.03)
SP GR SPEC: 1.01 — SIGNIFICANT CHANGE UP (ref 1–1.03)
SQUAMOUS # UR AUTO: 4 /HPF — SIGNIFICANT CHANGE UP (ref 0–5)
UROBILINOGEN FLD QL: 1 MG/DL — SIGNIFICANT CHANGE UP (ref 0.2–1)
UROBILINOGEN FLD QL: 1 MG/DL — SIGNIFICANT CHANGE UP (ref 0.2–1)
VARIANT LYMPHS # BLD: 3.1 % — SIGNIFICANT CHANGE UP (ref 0–6)
VARIANT LYMPHS NFR BLD MANUAL: 3.1 % — SIGNIFICANT CHANGE UP (ref 0–6)
WBC # BLD: 16.53 K/UL — HIGH (ref 3.8–10.5)
WBC # FLD AUTO: 16.53 K/UL — HIGH (ref 3.8–10.5)
WBC UR QL: 7 /HPF — HIGH (ref 0–5)

## 2025-03-31 PROCEDURE — 99233 SBSQ HOSP IP/OBS HIGH 50: CPT

## 2025-03-31 PROCEDURE — G0545: CPT

## 2025-03-31 PROCEDURE — 99232 SBSQ HOSP IP/OBS MODERATE 35: CPT

## 2025-03-31 RX ORDER — SODIUM HYPOCHLORITE 0.12 MG/ML
1 SOLUTION TOPICAL
Refills: 0 | Status: DISCONTINUED | OUTPATIENT
Start: 2025-03-31 | End: 2025-04-25

## 2025-03-31 RX ORDER — ACETAMINOPHEN 500 MG/5ML
1000 LIQUID (ML) ORAL ONCE
Refills: 0 | Status: COMPLETED | OUTPATIENT
Start: 2025-03-31 | End: 2025-03-31

## 2025-03-31 RX ADMIN — Medication 0.5 MILLIGRAM(S): at 00:11

## 2025-03-31 RX ADMIN — QUETIAPINE FUMARATE 25 MILLIGRAM(S): 25 TABLET ORAL at 21:46

## 2025-03-31 RX ADMIN — IPRATROPIUM BROMIDE AND ALBUTEROL SULFATE 3 MILLILITER(S): .5; 2.5 SOLUTION RESPIRATORY (INHALATION) at 22:43

## 2025-03-31 RX ADMIN — LIDOCAINE HYDROCHLORIDE 1 PATCH: 20 JELLY TOPICAL at 19:00

## 2025-03-31 RX ADMIN — Medication 300 MILLIGRAM(S): at 07:12

## 2025-03-31 RX ADMIN — Medication 1 APPLICATION(S): at 05:28

## 2025-03-31 RX ADMIN — Medication 200 GRAM(S): at 00:07

## 2025-03-31 RX ADMIN — GABAPENTIN 400 MILLIGRAM(S): 400 CAPSULE ORAL at 13:25

## 2025-03-31 RX ADMIN — MUPIROCIN CALCIUM 1 APPLICATION(S): 20 CREAM TOPICAL at 17:14

## 2025-03-31 RX ADMIN — APIXABAN 5 MILLIGRAM(S): 2.5 TABLET, FILM COATED ORAL at 17:13

## 2025-03-31 RX ADMIN — Medication 15 MILLILITER(S): at 05:24

## 2025-03-31 RX ADMIN — OXYCODONE HYDROCHLORIDE 10 MILLIGRAM(S): 30 TABLET ORAL at 04:42

## 2025-03-31 RX ADMIN — SODIUM HYPOCHLORITE 1 APPLICATION(S): 0.12 SOLUTION TOPICAL at 11:56

## 2025-03-31 RX ADMIN — GABAPENTIN 400 MILLIGRAM(S): 400 CAPSULE ORAL at 05:26

## 2025-03-31 RX ADMIN — OXYCODONE HYDROCHLORIDE 10 MILLIGRAM(S): 30 TABLET ORAL at 05:42

## 2025-03-31 RX ADMIN — Medication 0.5 MILLIGRAM(S): at 01:11

## 2025-03-31 RX ADMIN — Medication 1000 MILLIGRAM(S): at 00:49

## 2025-03-31 RX ADMIN — LIDOCAINE HYDROCHLORIDE 1 PATCH: 20 JELLY TOPICAL at 11:55

## 2025-03-31 RX ADMIN — LIDOCAINE HYDROCHLORIDE 1 PATCH: 20 JELLY TOPICAL at 11:56

## 2025-03-31 RX ADMIN — POLYETHYLENE GLYCOL 3350 17 GRAM(S): 17 POWDER, FOR SOLUTION ORAL at 12:04

## 2025-03-31 RX ADMIN — SODIUM HYPOCHLORITE 1 APPLICATION(S): 0.12 SOLUTION TOPICAL at 17:14

## 2025-03-31 RX ADMIN — FUROSEMIDE 40 MILLIGRAM(S): 10 INJECTION INTRAMUSCULAR; INTRAVENOUS at 05:27

## 2025-03-31 RX ADMIN — APIXABAN 5 MILLIGRAM(S): 2.5 TABLET, FILM COATED ORAL at 05:25

## 2025-03-31 RX ADMIN — CLOTRIMAZOLE 1 APPLICATION(S): 1 CREAM TOPICAL at 05:27

## 2025-03-31 RX ADMIN — Medication 500 MILLILITER(S): at 00:34

## 2025-03-31 RX ADMIN — Medication 1 TABLET(S): at 11:55

## 2025-03-31 RX ADMIN — GABAPENTIN 400 MILLIGRAM(S): 400 CAPSULE ORAL at 21:46

## 2025-03-31 RX ADMIN — OXYCODONE HYDROCHLORIDE 10 MILLIGRAM(S): 30 TABLET ORAL at 15:57

## 2025-03-31 RX ADMIN — CLOTRIMAZOLE 1 APPLICATION(S): 1 CREAM TOPICAL at 17:13

## 2025-03-31 RX ADMIN — OXYCODONE HYDROCHLORIDE 10 MILLIGRAM(S): 30 TABLET ORAL at 11:32

## 2025-03-31 RX ADMIN — Medication 400 MILLIGRAM(S): at 20:03

## 2025-03-31 RX ADMIN — IPRATROPIUM BROMIDE AND ALBUTEROL SULFATE 3 MILLILITER(S): .5; 2.5 SOLUTION RESPIRATORY (INHALATION) at 04:01

## 2025-03-31 RX ADMIN — Medication 0.2 MILLIGRAM(S): at 05:26

## 2025-03-31 RX ADMIN — OXYCODONE HYDROCHLORIDE 10 MILLIGRAM(S): 30 TABLET ORAL at 20:03

## 2025-03-31 RX ADMIN — Medication 0.1 MILLIGRAM(S): at 21:50

## 2025-03-31 RX ADMIN — AMLODIPINE BESYLATE 10 MILLIGRAM(S): 10 TABLET ORAL at 05:25

## 2025-03-31 RX ADMIN — Medication 25 GRAM(S): at 16:18

## 2025-03-31 RX ADMIN — IPRATROPIUM BROMIDE AND ALBUTEROL SULFATE 3 MILLILITER(S): .5; 2.5 SOLUTION RESPIRATORY (INHALATION) at 09:44

## 2025-03-31 RX ADMIN — Medication 25 GRAM(S): at 22:00

## 2025-03-31 RX ADMIN — Medication 2 TABLET(S): at 21:46

## 2025-03-31 RX ADMIN — MUPIROCIN CALCIUM 1 APPLICATION(S): 20 CREAM TOPICAL at 05:28

## 2025-03-31 RX ADMIN — Medication 25 GRAM(S): at 12:14

## 2025-03-31 RX ADMIN — CASPOFUNGIN ACETATE 260 MILLIGRAM(S): 5 INJECTION, POWDER, LYOPHILIZED, FOR SOLUTION INTRAVENOUS at 03:08

## 2025-03-31 RX ADMIN — Medication 25 GRAM(S): at 04:20

## 2025-03-31 RX ADMIN — OXYCODONE HYDROCHLORIDE 10 MILLIGRAM(S): 30 TABLET ORAL at 12:02

## 2025-03-31 RX ADMIN — Medication 3 MILLIGRAM(S): at 21:46

## 2025-03-31 RX ADMIN — FUROSEMIDE 40 MILLIGRAM(S): 10 INJECTION INTRAMUSCULAR; INTRAVENOUS at 13:26

## 2025-03-31 RX ADMIN — Medication 40 MILLIGRAM(S): at 05:26

## 2025-03-31 RX ADMIN — Medication 0.2 MILLIGRAM(S): at 13:25

## 2025-03-31 RX ADMIN — IPRATROPIUM BROMIDE AND ALBUTEROL SULFATE 3 MILLILITER(S): .5; 2.5 SOLUTION RESPIRATORY (INHALATION) at 15:29

## 2025-03-31 NOTE — PROGRESS NOTE ADULT - SUBJECTIVE AND OBJECTIVE BOX
Infectious Diseases Follow Up:    Patient is a 39y old  Male who presents with a chief complaint of sob (02 Mar 2025 06:19)      Interval History/ROS:  Pt febrile to 103 ON, worsening leukocytosis.   Pt w/o acute complaints     Allergies  No Known Allergies        ANTIMICROBIALS:  caspofungin IVPB 70 every 24 hours  piperacillin/tazobactam IVPB.- 3.375 once  piperacillin/tazobactam IVPB.. 3.375 every 8 hours      Current Abx:     Previous Abx     OTHER MEDS:  MEDICATIONS  (STANDING):  acetaminophen     Tablet .. 650 every 6 hours PRN  albuterol/ipratropium for Nebulization 3 every 6 hours  amLODIPine   Tablet 10 daily  apixaban 5 every 12 hours  cloNIDine 0.2 every 8 hours  dextrose 50% Injectable 25 once  dextrose 50% Injectable 12.5 once  dextrose 50% Injectable 25 once  dextrose Oral Gel 15 once PRN  furosemide    Tablet 40 two times a day  gabapentin 400 every 8 hours  glucagon  Injectable 1 once  HYDROmorphone  Injectable 0.5 every 6 hours PRN  insulin lispro (ADMELOG) corrective regimen sliding scale  three times a day before meals  insulin lispro (ADMELOG) corrective regimen sliding scale  at bedtime  melatonin 3 at bedtime  oxyCODONE    IR 10 every 4 hours PRN  pantoprazole    Tablet 40 before breakfast  polyethylene glycol 3350 17 daily  QUEtiapine 25 at bedtime  senna 2 at bedtime      Vital Signs Last 24 Hrs  T(C): 37.8 (31 Mar 2025 08:00), Max: 39.6 (30 Mar 2025 22:02)  T(F): 100 (31 Mar 2025 08:00), Max: 103.2 (30 Mar 2025 22:02)  HR: 118 (31 Mar 2025 09:48) (106 - 133)  BP: 134/71 (31 Mar 2025 08:00) (117/52 - 134/71)  BP(mean): 85 (31 Mar 2025 08:00) (71 - 85)  RR: 22 (31 Mar 2025 08:00) (18 - 23)  SpO2: 98% (31 Mar 2025 09:48) (94% - 100%)    Parameters below as of 31 Mar 2025 09:48  Patient On (Oxygen Delivery Method): nasal cannula        PHYSICAL EXAM:  GENERAL: NAD  HEAD:  Atraumatic, Normocephalic  EYES: EOMI, conjunctiva and sclera clear  CHEST/LUNG: On NC, CTAB  HEART: RRR  ABDOMEN: Soft, Nontender, Nondistended;  Extremities: B/l feet wrapped   PSYCH: AAOx3                        8.8    16.53 )-----------( 402      ( 30 Mar 2025 23:52 )             29.0       03-30    131[L]  |  88[L]  |  7   ----------------------------<  108[H]  4.0   |  29  |  0.85    Ca    8.9      30 Mar 2025 23:52  Phos  4.7       Mg     1.40         TPro  7.5  /  Alb  2.7[L]  /  TBili  0.7  /  DBili  x   /  AST  15  /  ALT  13  /  AlkPhos  57        Urinalysis Basic - ( 31 Mar 2025 08:08 )    Color: Yellow / Appearance: Cloudy / S.010 / pH: x  Gluc: x / Ketone: 15 mg/dL  / Bili: Negative / Urobili: 1.0 mg/dL   Blood: x / Protein: 30 mg/dL / Nitrite: Negative   Leuk Esterase: Small / RBC: 1 /HPF / WBC 5 /HPF   Sq Epi: x / Non Sq Epi: 2 /HPF / Bacteria: Many /HPF        MICROBIOLOGY:  v  Trach Asp Tracheal Aspirate  25   Commensal kyle consistent with body site  --    Few polymorphonuclear leukocytes per low power field  No Squamous epithelial cells per low power field  No organisms seen per oil power field      Blood Blood-Peripheral  25   No growth at 5 days  --  --      Blood Blood-Venous  25   No growth at 5 days  --  --      Blood Blood-Peripheral  25   No growth at 5 days  --  Blood Culture PCR  Candida albicans      Nares/Axilla/Groin Nares/Axilla/Groin  25   Culture NEGATIVE for Candida auris.  This surveillance culture is intended for Infection Control purposes only.  A negative result does not preclude the carriage of other fungal  organisms.  --  --      Blood Blood-Venous  25   Growth in aerobic bottle: Candida albicans  See previous culture [id number]  --    Growth in aerobic bottle: Yeast like cells      Blood Blood-Peripheral  25   Growth in aerobic bottle: Candida albicans  See previous culture [id number]  --    Growth in aerobic bottle: Yeast like cells      Trach Asp Tracheal Aspirate  25   Commensal kyle consistent with body site  --    Few polymorphonuclear leukocytes per low power field  Rare Squamous epithelial cells per low power field  Rare Gram Positive Rods seen per oil power field  Rare Gram Negative Rods seen per oil power field      Blood Blood-Venous  03-15-25   Growth in aerobic bottle: Candida albicans  See previous culture [id number]  --    Growth in aerobic bottle: Yeast like cells      Blood Blood-Peripheral  03-15-25   Growth in aerobic bottle: Candida albicans  Direct identification is available within approximately 3-5  hours either by Blood Panel Multiplexed PCR or Direct  MALDI-TOF. Details: [email or web address]  --  Blood Culture PCR  Candida albicans      Catheterized Catheterized  25   <10,000 CFU/mL Normal Urogenital Kyle  --  --      Blood Blood-Peripheral  25   No growth at 5 days  --  --      Blood Blood-Peripheral  25   No growth at 5 days  --  --      Trach Asp Tracheal Aspirate  03-10-25   Commensal kyle consistent with body site  --    Few polymorphonuclear leukocytes per low power field  No Squamous epithelial cells per low power field  No organisms seen per oil power field      Bronchial Bronchial Lavage  25   No growth  --    Rare polymorphonuclear leukocytes per low power field  No squamous epithelial cells per low power field  No organisms seen per oil power field                RADIOLOGY:

## 2025-03-31 NOTE — PROGRESS NOTE ADULT - ASSESSMENT
Assessment/Plan:      -Continue to offload pressure; low airloss support surface, turn and position per protocol with use of fluidized positioning devices, continue incontinence care per protocol and use of single breathable incontinence pads, continue to offload heels with complete cair boots.  -Continue Nutritional management as per RD recommendations.    Upon discharge follow up at outpatient [facility]. [address] [place] [address]. [phone].      Findings and plan discussed with patient, family and primary team. All questions and concerns addressed to meet patient and family's satisfaction.  Will continue to follow perioidcally while inpatient, please reconsult earlier as needed.  Remainder of care per primary team.  Thank you.    EDOUARD Driscoll, STEFANIE   MS TEAMS    If after 4PM or before 7:30AM on Mon-Friday or weekend/holiday please contact general surgery for urgent matters.   Team A- [id number]   Team B- [id number]  For non-urgent matters e-mail [email or web address]    I/We spent 30 minutes face-to-face with this patient of which more than 50% of the time was spent counseling/coordinating care of this patient.   Assessment/Plan: 40 YO M with PMHx of morbid obesity, BEA on CPAP, pAFIB (not on AC), HTN, and BL papilledema attributed to pseudotumor cerebri who presented initially to [facility] on 2/24 with SOB and BL LE edema. Found with URI outpatient with progressive SOB, and concern noted for MFPNA on imaging. Course progressed with AHRF with worsening O2 demand, respiratory distress, secretions, and somnolence requiring intubation (difficult with success after 6 attempts) in ED and admission to [facility] MICU. While in MICU, patient noted with increasing O2 demand with no improvement despite optimal vent management. Concern noted for progressive ARDS, unable to prone given morbid obesity, and ultimately cannulated for vvECMO on 2/25 and transferred to [facility] for further management on 2/26. While at [facility], tx with diuresis and ABX, and ultimately decannulated and s/p 60XLTCP trach and PEG on 3/7. Patient ultimately transferred to [facility] on 3/11 and course complicated by recurrent high grade fevers and found with fungemia.     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   -Risk for intertriginous moisture associated dermatitis.  -Peritubular skin management of PEG    Plan:  -Per records initially with deep tissue pressure injury, now evolved, remains with increased moisture secondary to body habitus and fevers, fecal and urinary incontinence, history of critical illness (mechanical ventilation > 72 hours, sepsis), and pressure   -CT abdomen/pelvis with IV contrast with superficial thickening overyling the sacrum with underlying edema of the SubQ fat, no evidence of underlying tract or drainable fluid collection- findings consistent with clinical exam- wound without palpable and/or obvious drainable collections, no undermining or tunneling.  -MRI lumbar spine- with no mention of sacral ulcer- likely unable to see given are of focus, findings per primary team.  -Right buttock area of nonfluctuant induration remains; overall wound without obvious s/s of acute soft tissue infection.  -No sharp debridement indicated.  -Topical recommendations:   ·	Sacral/gluteal cleft to bilateral buttocks- cleanse wound and satellite ulcerations to b/l buttocks with NS, Dry well. Apply Liquid barrier film to periwound skin. Apply hydrocolloid to bilateral buttock shallow ulcerations, massage to facilitate adhesion of dressing- if dressing is not soiled may change every 3 days. Pack sacral/gluteal cleft with Dakins 1/4 strength moistened 2" kerlix, leave at least 2" out at end to ensure full removal of packing with subsequent dressing changes. Cover with 4x4 gauze and tegaderm. Change twice a day and prn if soiled/compromised. Once dressing is in place and perineal care is provided, apply lida moisture barrier cream to exposed skin every shift and prn.  ·	Peritubular Skin of PEG: Cleanse q shift with NS, apply liquid barrier film beneath matt disc.  If redness noted under matt disc bumper apply thin foam  dressing without border (Mepilex Lite)- cut to mid dressing with a 'Y' shape. Secondary securement to abdomen taping in 'H' fashion with Steri-strips.  ·	Bilateral abdominal pannus, bilateral groin: Cleanse with luke-warm soap and water, dry well. Apply Interdry textile sheeting, under intertriginous folds leaving 2 inches exposed at ends to wick, remove to wash & dry affected area, then replace. Individual sheeting may be used for up to 5 days unless soiled. Inspect skin daily.   -Continue to offload pressure; bariatric low airloss support surface, turn and position per protocol with use of fluidized positioning devices, continue incontinence and moisture care per protocol and use of single breathable incontinence pads, continue to offload heels with fluidized positioning devices. Continue use of bariatric seat cushion (people soft #[id number]) and limit sit time- no more than 2 consecutive hours at any given time.   -Continue Nutritional management as per RD recommendations.  -B/l feet management per Podiatry surgery      Upon discharge follow up at outpatient [facility]. [address] [place] [address]. [phone].    Findings and plan discussed with patient, ID Dr. Owens and primary team MAX Ramos. All questions and concerns addressed to meet patient's satisfaction.  Will continue to follow periodically while inpatient, please reconsult earlier as needed.  Remainder of care per primary team.  Thank you.    EDOUARD Driscoll, STEFANIE   MS TEAMS    If after 4PM or before 7:30AM on Mon-Friday or weekend/holiday please contact general surgery for urgent matters.   Team A- [id number]   Team B- [id number]  For non-urgent matters e-mail [email or web address]    I spent 50 minutes face-to-face with this patient of which more than 50% of the time was spent counseling/coordinating care of this patient.

## 2025-03-31 NOTE — PROGRESS NOTE ADULT - ASSESSMENT
38 YO M with PMHx of morbid obesity, BEA on CPAP, pAFIB (not on AC), HTN, and BL papilledema attributed to pseudotumor cerebri who presented initially to [facility] on 2/24 with SOB and BL LE edema. Found with URI outpatient with progressive SOB, and concern for noted for MFPNA on imaging. Course progressed with AHRF with worsening O2 demand, respiratory distress, secretions, and somnolence requiring intubation (difficult with success after 6 attempts) in ED and admission to [facility] MICU. While in MICU, patient noted with increasing O2 demand with no improvement despite optimal vent management. Concern noted for progressive ARDS, unable to prone given morbid obesity, and ultimately cannulated for vvECMO on 2/25 and transferred to [facility] for further management on 2/26. While at [facility], tx with diuresis and ABX, and ultimately decannulated and s/p 60XLTCP trach and PEG on 3/7. Patient ultimately transferred to [facility]CU on 3/11 and course complicated by recurrent high grade fevers and found with fungemia.    NEUROLOGY  # Hx of Pseudotumor Cerebri   - s/p LP in 2024 with high opening pressure  - s/p MRI 2024 with possible pituitary mass but unclear because of pressure effect from pseudotumor cerebri causing partially empty sella.  - Prolactin elevated   - ACTH low but recieved steroids during admission   - FT4 low normal and TSH normal, but given FT4 low normal TSH should be higher   - Discuss endocrine consult for inhouse work up vs outpatient.     # Sedation   - Weaned off sedation in ICU   - Nimbex turned off 2/27   - Prop turned off 3/9   - Precedex turned off and catapres started 3/11  - Continue on catapres 0.2 TID (decreased 3/25) - taper    # Insomnia   - Patient worked night shift x 15 years   - Trouble sleeping at night leading to day time sleepiness and poor participation with PT  - Continue on Seroquel 25 QHS (increased 3/18)   - Continue on melatonin   - Continue on neurontin as below    # BL LE neuropathy   - CIPN concern   - Neurontin increased to 300mg Q8H with relief   - added Percocet Q4H for moderate pain (3/25) and continue on Dilaudid Q6H for severe pain   - Pain mgt called to consult - with recs for tylenol ATC, NEurontin increased to 400mg q6, Oxycodone 5 q4h prn , Dilaudid for BREAKTHROUGH pain only, Lidocaine patches on each leg, ice prn to feet  - Some improvement in pain - PT consult for TENS,   - osycodone inc to 10mg as pt with no relief /buttocks area indurated /tender CT ordered     CARDIOVASCULAR  # HTN   - HTN noted in ICU requiring cardene and esmolol GTTs   - Lisinopril dc'ed to increase catapres   - Continue on norvasc 10 and catapres 0.3 TID   - Monitor BP     # AFIB   - Hx of pAFIB   - No RVR episodes or pAFIB episodes in ICU   - No rate control medications needed  - Monitor on telemetry     # RV dilation second to PHTN   - POCUS on arrival to [facility] with RVE concern   - TTE 10/2024 with EF 55-60 with normal LVSF, moderate LVH and normal RVSF and size with no concern for pHTN   - TTE on 2/25 at  with EF 61 and normal LVSF, but enlarged RV size with reduced RVSF, mild TR, mild PHTN with PASP 49, and dilated IVC to 3.4cn, but normal TAPSE 2.1.  - Continue on aggressive diuresis in ICU    - TTE 3/11 with RVSF reduced with TAPSE 1.6. IVC improved to 2.12cm.   - Continue on lasix 40 PO BID   - Monitor IO    RESPIRATORY  # ARDS second to MFPNA   - Hx of BEA on CPAP presented to  post URI with SOB and found with AHRF with progression to ARDS second to post viral MFPNA   - Intubated 2/25   - Cannulated for vvECMO 2/26   - s/p 60XLTCP tracheostomy 3/7   - Decannulated 3/7   - CTSx removed tracheostomy and ECMO sutures on 3/17   - Continue on TC QD with PMV as able with strong phonation  - Continue on PS 18/10/40 QHS given OHS   - Continue on nebs and HTS   -  trials continue and now trialing overnight with BIPAP for OHS (10/8/12)  - trend VBG QD  - Decannulated 3/28     GI  # Dysphagia   - s/p PEG 3/7   - Attempted green dye on 3/13 and failed  - Continue on TF  - Continue on miralax and senna  - RPT green dye passed 3/19  - 3/20 Passed FEEST - on soft bite sized with thin liquids     # Mild transaminitis   - LFTs elevated in ICU with TBILI elevated likely from ECMO hemolysis   - US ABD with hepatic steatosis   - Trend LFTs      RENAL  # ELLA   - ELLA likely from cardiorenal with RVE   - Diuresed in ICU and improved   - Monitor renal function and UOP     # Hypernatremia   - Hypernatremia with fever spikes   - Fever curve improved and attempting to wean FWF   - Continue on  Q6H (decreased 3/19)    INFECTIOUS DISEASE  # Recurrent fevers with fungemia from unknown source   - Recurrent fevers post ECMO decannulation thought initially to be cytokine surge   - BCx, SCx, UCx, RVP and MRSA RPT on 3/12 negative   - Completed zosyn (3/12-3/18) empirically with improved WBC , however recurrent fevers noted.   - RPT SCx, UA, CXR and RVP negative  - TTE 3/17 with no IE  - PanCT 3/17 with PNA and persistent panniculitis   - BCx 3/15 and 3/16 with candida albicans.   - Concern for possible source from panniculitis   - Continue on caspo (3/17 - ) and RPT BCx 3/18 negative  - ID following  - plan for 2 week course after negative BCx to 4/2  - 3/20 Bcx- NGTD    # MFPNA and abdominal pannus cellulitis   - Presented to  post URI with SOB and found with AHRF with progression to ARDS second to post viral MFPNA   - RVP, legionella, strept, BAL, BCx, UCx, HIV, PCP, and adenovirus PCR negative   - Initially started on multiple agents in ICU and ultimatelty completed zosyn (2/26-3/9) ZMAX (2/25-2/26) and vanco (2/26-3/1)     # R/o Sacral OM  - Noted with hard indurated sacral wound by Wound Care Team  - CT Pelvis (3/29) revaeling superficial skin thickening overlying the sacrum with underlying edema of the subcutaneous fat, in keeping with the clinical history of sacral wound. No evidence of underlying tract or drainable fluid collection.    # Penile discharge   - GC/ chlamydia negative   - HIV negative   - Monitor for now    # PPX   - MRSA PCR positive and completed bactroban (2/26-3/3)     HEME   # Anemia likely second to ECMO circuit vs AOCD   - HH low in ICU second to ECMO circuit   - HH dropping again today however no bleeding noted   - Microcytic and hemochromic, sent anemia panel 3/19  - Trend HH     VASCULAR   # R IJ and BL LE DVTs   - Post ECMO dopplers on 3/9 negative for BL UE/ LE DVTs.   - Subsequent post ECMO dopplers performed on 3/14 and noted with acute, non-occlusive deep vein thrombosis noted within the right internal jugular vein. acute, occlusive deep vein thromboses noted within the right and left peroneal veins at both mid calves, and age-indeterminate, occlusive deep vein thrombosis visualized within the left gastrocnemius vein at the proximal calf.     - Continue on argatroban GTT and change to eliquis     PODIATRY   # BL plantar feet blisters likely pressors induced  - Seen by podiatry and blisters lanced on 3/4 and again on 3/18  - Continue on local wound care per podiatry   - Podiatry following  - OK for WBAT will fu with PT for offloading shoe if appropriate  - Pain mgt consult   - Podiatry FU 3/28 done     ENDOCRINE  # Pre-DM2   - A1C 6.7  - Continue on ISS   - Monitor FS   - IF no demand then stop ISS     LINES/ TUBES  - R IJ and R FEM ECMO (2/26-3/7)     SKIN  # Pannus canndial intertrigo   # Sacrum/ buttock MAD  - Continue on clotrimazole cream   - WOC appreciated   -Seen by WC pt noted to have oozing from Buttock wound surgicel placed by wound care On follow up no further bleeding  - Wound care placed orders   - No bleeding     ETHICS/ GOC    - FULL CODE     DISPO - PT following  Seen by PMR for acute rehab per recs

## 2025-03-31 NOTE — PROGRESS NOTE ADULT - SUBJECTIVE AND OBJECTIVE BOX
Patient is a 39y old  Male who presents with a chief complaint of sob (02 Mar 2025 06:19)      Interval history:  seated in chair    REVIEW OF SYSTEMS  weakness    PAST MEDICAL & SURGICAL HISTORY  HTN (hypertension)    Atrial fibrillation    Papilledema of both eyes    Chronic sinusitis    Morbid obesity    No significant past surgical history         CURRENT FUNCTIONAL STATUS  3/29  Therapeutic Interventions      Bed Mobility  Bed Mobility Training Rehab Potential: good, to achieve stated therapy goals  Bed Mobility Training Rolling/Turning: moderate assist (50% patient effort);  1 person assist;  nonverbal cues (demo/gestures);  verbal cues;  bed rails  Bed Mobility Training Sit-to-Supine: 1 person assist;  nonverbal cues (demo/gestures);  verbal cues;  bed rails;  moderate assist (50% patient effort);  *Long sit  Bed Mobility Training Supine-to-Sit: moderate assist (50% patient effort);  1 person assist;  nonverbal cues (demo/gestures);  verbal cues;  bed rails;  *Long sit  Bed Mobility Training Limitations: impaired ability to control trunk for mobility;  decreased ability to use legs for bridging/pushing;  decreased ability to use arms for pushing/pulling;  decreased strength;  impaired balance;  impaired motor control;  impaired postural control    Therapeutic Exercise  Therapeutic Exercise Rehab Effort: good  Therapeutic Exercise Detail: Patient participated in active-assistive therapeutic exercises 1 x 10 throughout bilateral lower extremities.           RECENT LABS/IMAGING  CBC Full  -  ( 30 Mar 2025 23:52 )  WBC Count : 16.53 K/uL  RBC Count : 3.49 M/uL  Hemoglobin : 8.8 g/dL  Hematocrit : 29.0 %  Platelet Count - Automated : 402 K/uL  Mean Cell Volume : 83.1 fL  Mean Cell Hemoglobin : 25.2 pg  Mean Cell Hemoglobin Concentration : 30.3 g/dL  Auto Neutrophil # : x  Auto Lymphocyte # : x  Auto Monocyte # : x  Auto Eosinophil # : x  Auto Basophil # : x  Auto Neutrophil % : x  Auto Lymphocyte % : x  Auto Monocyte % : x  Auto Eosinophil % : x  Auto Basophil % : x    03-30    131[L]  |  88[L]  |  7   ----------------------------<  108[H]  4.0   |  29  |  0.85    Ca    8.9      30 Mar 2025 23:52  Phos  4.7     03-30  Mg     1.40     03-30    TPro  7.5  /  Alb  2.7[L]  /  TBili  0.7  /  DBili  x   /  AST  15  /  ALT  13  /  AlkPhos  57      Urinalysis Basic - ( 31 Mar 2025 08:08 )    Color: Yellow / Appearance: Cloudy / S.010 / pH: x  Gluc: x / Ketone: 15 mg/dL  / Bili: Negative / Urobili: 1.0 mg/dL   Blood: x / Protein: 30 mg/dL / Nitrite: Negative   Leuk Esterase: Small / RBC: 1 /HPF / WBC 5 /HPF   Sq Epi: x / Non Sq Epi: 2 /HPF / Bacteria: Many /HPF        VITALS  T(C): 37.8 (25 @ 08:00), Max: 39.6 (25 @ 22:02)  HR: 118 (25 @ 09:48) (106 - 133)  BP: 134/71 (25 @ 08:00) (117/52 - 137/88)  RR: 22 (25 @ 08:00) (18 - 23)  SpO2: 98% (25 @ 09:48) (94% - 100%)  Wt(kg): --    ALLERGIES  No Known Allergies      MEDICATIONS   acetaminophen     Tablet .. 650 milliGRAM(s) Oral every 6 hours PRN  albuterol/ipratropium for Nebulization 3 milliLiter(s) Nebulizer every 6 hours  amLODIPine   Tablet 10 milliGRAM(s) Oral daily  apixaban 5 milliGRAM(s) Oral every 12 hours  caspofungin IVPB 70 milliGRAM(s) IV Intermittent every 24 hours  chlorhexidine 0.12% Liquid 15 milliLiter(s) Swish and Spit two times a day  chlorhexidine 2% Cloths 1 Application(s) Topical <User Schedule>  cloNIDine 0.2 milliGRAM(s) Oral every 8 hours  clotrimazole 1% Cream 1 Application(s) Topical two times a day  Dakins Solution - 1/4 Strength 1 Application(s) Topical daily  dextrose 50% Injectable 25 Gram(s) IV Push once  dextrose 50% Injectable 12.5 Gram(s) IV Push once  dextrose 50% Injectable 25 Gram(s) IV Push once  dextrose Oral Gel 15 Gram(s) Oral once PRN  furosemide    Tablet 40 milliGRAM(s) Oral two times a day  gabapentin 400 milliGRAM(s) Oral every 8 hours  glucagon  Injectable 1 milliGRAM(s) IntraMuscular once  HYDROmorphone  Injectable 0.5 milliGRAM(s) IV Push every 6 hours PRN  insulin lispro (ADMELOG) corrective regimen sliding scale   SubCutaneous three times a day before meals  insulin lispro (ADMELOG) corrective regimen sliding scale   SubCutaneous at bedtime  lidocaine   4% Patch 1 Patch Transdermal daily  lidocaine   4% Patch 1 Patch Transdermal daily  melatonin 3 milliGRAM(s) Oral at bedtime  multivitamin 1 Tablet(s) Oral daily  mupirocin 2% Ointment 1 Application(s) Topical two times a day  oxyCODONE    IR 10 milliGRAM(s) Oral every 4 hours PRN  pantoprazole    Tablet 40 milliGRAM(s) Oral before breakfast  piperacillin/tazobactam IVPB.- 3.375 Gram(s) IV Intermittent once  piperacillin/tazobactam IVPB.. 3.375 Gram(s) IV Intermittent every 8 hours  polyethylene glycol 3350 17 Gram(s) Oral daily  QUEtiapine 25 milliGRAM(s) Oral at bedtime  senna 2 Tablet(s) Oral at bedtime      ----------------------------------------------------------------------------------------     PHYSICAL EXAM  Constitutional - NAD in chair  Chest - no respiratory distress  Cardiovascular -mild tachy  Abdomen - +PEG  Extremities - dressings b/l feet  Neurologic Exam -                    Cognitive - Awake, Alert, AAO to self, place, date, year, situation      Communication - fluent     Cranial Nerves - CN 2-12 intact     Motor -                     LEFT    UE - 4+/5                    RIGHT UE - 4+/5                    LEFT    LE -  2+/5 however dorsiflexion 0/5                    RIGHT LE -  2-/4     Sensory - Intact to LT       Balance - WNL Static  Psychiatric - Mood stable, Affect WNL  ----------------------------------------------------------------------------------------  ASSESSMENT/PLAN  40 yo m PMHx of morbid obesity, BEA on CPAP, pAFIB (not on AC), HTN, and BL papilledema attributed to pseudotumor cerebri who presented initially to [facility] on  with SOB and BL LE edema. Found with URI outpatient with progressive SOB, and concern for noted for MFPNA on imaging. Course progressed with AHRF with worsening O2 demand, respiratory distress, secretions, and somnolence requiring intubation (difficult with success after 6 attempts) in ED and admission to [facility] MICU. While in MICU, patient noted with increasing O2 demand with no improvement despite optimal vent management. Concern noted for progressive ARDS, unable to prone given morbid obesity, and ultimately cannulated for vvECMO on  and transferred to [facility] for further management on .   transferred to [facility] 3/11, course complicated by fever. afebrile past 48 hours  s/p trach and peg 3/7  diet- soft bite size diet with thin liquids (must wear one way speaking valve with all meals)  continue bedside PT and OT     Pain - acetaminphen, gabapentin 400mg q8    DVT PPX - on eliquis  Rehab -  tolerated bedside therapy, goal is for acute rehab when medically cleared.  Patient can tolerate 3 hours per day of therapy with medical supervision      Total time spent to review relevant records and imaging results, examine patient, complete documentation, and when applicable discuss the case with the patient, family, , social workers, and medical team: 35  minutes

## 2025-03-31 NOTE — PROGRESS NOTE ADULT - SUBJECTIVE AND OBJECTIVE BOX
Incomplete full note to follow    [facility]-- WOUND TEAM -- FOLLOW UP NOTE  --------------------------------------------------------------------------------    subjective:    Interval HPI/24 hour events:     Chart reviewed including labs and relevant images      Diet:      ROS: General/ SKIN/ MSK/ Neuro/ GI see HPI  all other systems negative  pt unable to offer    ALLERGIES & MEDICATIONS  --------------------------------------------------------------------------------  Allergies    No Known Allergies    Intolerances          STANDING INPATIENT MEDICATIONS    albuterol/ipratropium for Nebulization 3 milliLiter(s) Nebulizer every 6 hours  amLODIPine   Tablet 10 milliGRAM(s) Oral daily  apixaban 5 milliGRAM(s) Oral every 12 hours  caspofungin IVPB 70 milliGRAM(s) IV Intermittent every 24 hours  chlorhexidine 0.12% Liquid 15 milliLiter(s) Swish and Spit two times a day  chlorhexidine 2% Cloths 1 Application(s) Topical <User Schedule>  cloNIDine 0.2 milliGRAM(s) Oral every 8 hours  clotrimazole 1% Cream 1 Application(s) Topical two times a day  Dakins Solution - 1/4 Strength 1 Application(s) Topical daily  dextrose 50% Injectable 25 Gram(s) IV Push once  dextrose 50% Injectable 12.5 Gram(s) IV Push once  dextrose 50% Injectable 25 Gram(s) IV Push once  furosemide    Tablet 40 milliGRAM(s) Oral two times a day  gabapentin 400 milliGRAM(s) Oral every 8 hours  glucagon  Injectable 1 milliGRAM(s) IntraMuscular once  insulin lispro (ADMELOG) corrective regimen sliding scale   SubCutaneous three times a day before meals  insulin lispro (ADMELOG) corrective regimen sliding scale   SubCutaneous at bedtime  lidocaine   4% Patch 1 Patch Transdermal daily  lidocaine   4% Patch 1 Patch Transdermal daily  melatonin 3 milliGRAM(s) Oral at bedtime  multivitamin 1 Tablet(s) Oral daily  mupirocin 2% Ointment 1 Application(s) Topical two times a day  pantoprazole    Tablet 40 milliGRAM(s) Oral before breakfast  piperacillin/tazobactam IVPB.- 3.375 Gram(s) IV Intermittent once  piperacillin/tazobactam IVPB.- 3.375 Gram(s) IV Intermittent once  piperacillin/tazobactam IVPB.. 3.375 Gram(s) IV Intermittent every 8 hours  polyethylene glycol 3350 17 Gram(s) Oral daily  QUEtiapine 25 milliGRAM(s) Oral at bedtime  senna 2 Tablet(s) Oral at bedtime      PRN INPATIENT MEDICATION  acetaminophen     Tablet .. 650 milliGRAM(s) Oral every 6 hours PRN  dextrose Oral Gel 15 Gram(s) Oral once PRN  HYDROmorphone  Injectable 0.5 milliGRAM(s) IV Push every 6 hours PRN  oxyCODONE    IR 10 milliGRAM(s) Oral every 4 hours PRN        Vital signs:  T(C): 37.8 (03-31-25 @ 08:00), Max: 39.6 (03-30-25 @ 22:02)  HR: 118 (03-31-25 @ 09:48) (106 - 133)  BP: 134/71 (03-31-25 @ 08:00) (117/52 - 137/88)  RR: 22 (03-31-25 @ 08:00) (18 - 23)  SpO2: 98% (03-31-25 @ 09:48) (94% - 100%)  Wt(kg): --        03-30-25 @ 07:01  -  03-31-25 @ 07:00  --------------------------------------------------------  IN: 0 mL / OUT: 2300 mL / NET: -2300 mL        Physical exam:  General: NAD, A & O x 3, lethargic. Cachetic, temporal wasting, prominent bony prominences. Obese. Funtional quadrapalegic  HEENT: NC/NT, mucosa moist/dry. Poor dentation.  GI: Soft, NT/ND. + BS. Fecal incontinence.  : Indwelling baires catheter, condom cathete, penile pouch  Vascular: No temperature changes noted. Increased coolness from midfoot to distal toes. + (  ) DP/PT pulses. Capillary refill < 3 seconds. + Edema bilaterally. Thickened toenails. (+) hemosiderin staining.  Skin: moist, adequate skin turgor. Dry, poor skin turgor. Frail.  Psych: mood and demeanor appropriate      LABS/ CULTURES/ RADIOLOGY:              8.8    16.53 >-----------<  402      [03-30-25 @ 23:52]              29.0     131  |  88  |  7   ----------------------------<  108      [03-30-25 @ 23:52]  4.0   |  29  |  0.85        Ca     8.9     [03-30-25 @ 23:52]      Mg     1.40     [03-30-25 @ 08:21]      Phos  4.7     [03-30-25 @ 08:21]    TPro  7.5  /  Alb  2.7  /  TBili  0.7  /  DBili  x   /  AST  15  /  ALT  13  /  AlkPhos  57  [03-30-25 @ 23:52]              CAPILLARY BLOOD GLUCOSE      POCT Blood Glucose.: 125 mg/dL (31 Mar 2025 07:47)  POCT Blood Glucose.: 107 mg/dL (30 Mar 2025 21:23)  POCT Blood Glucose.: 96 mg/dL (30 Mar 2025 17:05)        Triglycerides, Serum: 169 mg/dL (03-08-25 @ 00:18)                A1C with Estimated Average Glucose Result: 6.7 % (02-25-25 @ 05:44)               [facility]-- WOUND TEAM -- FOLLOW UP NOTE  --------------------------------------------------------------------------------    subjective: Patient seen and examined at bedside with safe patient handling and PCA at bedside. Patient denies pain and/or tenderness to sacral wound. Endorses that he is incontinent of urine and stool, currently soiled with urine. Denies CP, SOB, abdominal pain, n/v. Reports he has had a fever, denies chills.     Interval HPI/24 hour events:   -Febrile T-max 103.2 last night   -WBC uptrending  -3/30 s/p MRI lumbar spine, 3/29 s/p CT abd/pelvis with contrast - no evidence of drainable fluid collection or tract in sacral region   -3/28 trach decannulated as planned    Chart reviewed including labs and relevant images      Diet:  Diet, Soft and Bite Sized:   Supplement Feeding Modality:  Oral  Ensure Max Cans or Servings Per Day:  1       Frequency:  Two Times a day (03-24-25 @ 16:03) [Active]      ROS: General, skin see above.  All other systems negative.    ALLERGIES & MEDICATIONS  --------------------------------------------------------------------------------  Allergies    No Known Allergies    Intolerances          STANDING INPATIENT MEDICATIONS    albuterol/ipratropium for Nebulization 3 milliLiter(s) Nebulizer every 6 hours  amLODIPine   Tablet 10 milliGRAM(s) Oral daily  apixaban 5 milliGRAM(s) Oral every 12 hours  caspofungin IVPB 70 milliGRAM(s) IV Intermittent every 24 hours  chlorhexidine 0.12% Liquid 15 milliLiter(s) Swish and Spit two times a day  chlorhexidine 2% Cloths 1 Application(s) Topical <User Schedule>  cloNIDine 0.2 milliGRAM(s) Oral every 8 hours  clotrimazole 1% Cream 1 Application(s) Topical two times a day  Dakins Solution - 1/4 Strength 1 Application(s) Topical daily  dextrose 50% Injectable 25 Gram(s) IV Push once  dextrose 50% Injectable 12.5 Gram(s) IV Push once  dextrose 50% Injectable 25 Gram(s) IV Push once  furosemide    Tablet 40 milliGRAM(s) Oral two times a day  gabapentin 400 milliGRAM(s) Oral every 8 hours  glucagon  Injectable 1 milliGRAM(s) IntraMuscular once  insulin lispro (ADMELOG) corrective regimen sliding scale   SubCutaneous three times a day before meals  insulin lispro (ADMELOG) corrective regimen sliding scale   SubCutaneous at bedtime  lidocaine   4% Patch 1 Patch Transdermal daily  lidocaine   4% Patch 1 Patch Transdermal daily  melatonin 3 milliGRAM(s) Oral at bedtime  multivitamin 1 Tablet(s) Oral daily  mupirocin 2% Ointment 1 Application(s) Topical two times a day  pantoprazole    Tablet 40 milliGRAM(s) Oral before breakfast  piperacillin/tazobactam IVPB.- 3.375 Gram(s) IV Intermittent once  piperacillin/tazobactam IVPB.- 3.375 Gram(s) IV Intermittent once  piperacillin/tazobactam IVPB.. 3.375 Gram(s) IV Intermittent every 8 hours  polyethylene glycol 3350 17 Gram(s) Oral daily  QUEtiapine 25 milliGRAM(s) Oral at bedtime  senna 2 Tablet(s) Oral at bedtime      PRN INPATIENT MEDICATION  acetaminophen     Tablet .. 650 milliGRAM(s) Oral every 6 hours PRN  dextrose Oral Gel 15 Gram(s) Oral once PRN  HYDROmorphone  Injectable 0.5 milliGRAM(s) IV Push every 6 hours PRN  oxyCODONE    IR 10 milliGRAM(s) Oral every 4 hours PRN        Vital signs:  T(C): 37.8 (03-31-25 @ 08:00), Max: 39.6 (03-30-25 @ 22:02)  HR: 118 (03-31-25 @ 09:48) (106 - 133)  BP: 134/71 (03-31-25 @ 08:00) (117/52 - 137/88)  RR: 22 (03-31-25 @ 08:00) (18 - 23)  SpO2: 98% (03-31-25 @ 09:48) (94% - 100%)  Wt(kg): 202.1 kg (03-23-25)        03-30-25 @ 07:01  -  03-31-25 @ 07:00  --------------------------------------------------------  IN: 0 mL / OUT: 2300 mL / NET: -2300 mL      Constitutional: NAD, A&OX3. Morbidly Obese. Patient able to assist with turn to the right.  (+) bariatric low airloss support surface (+) fluidized positioning devices, heels elevated with large Z fluidized positioning device, bariatric seat cushion on chair   HEENT: NC/AT, non icteric, mucosa moist. Tracheostomy site dressing changed stoma [id number], peristomal skin intact; cleanse with NS, applied silicone foam with border.  Cardiovascular: tachycardic   Respiratory: supplemental oxygen via NC, nonlabored, equal chest expansion.  Gastrointestinal: soft NT/ND.   (+) PEG peristomal denuded epidermis from 5-6 o'clock extending 0.5cm from edge, exposing pink-moist dermis, scant serous drainage, no purulent drainage, remaining peristomal skin intact- no edema, no obvious erythema, no induration, no fluctuance, no crepitus.  Increase moisture beneath ABD pannus and bilateral groin, skin intact. Incontinent pasty stool, perineal care provided   : penile pouch changed with PCA at bedside, incontinent urine.  Musculoskeletal:  aROM to b/l UE, b/l le limited aROM, requires 1 -2 person assistance with turning and positioning, no gross deformities or contractures.  Vascular: Deferred, seen by Podiatry today, b/l feet dressings c/d/i.  Skin:  moist w/ good turgor. Intertriginous folds of posterior trunk along bilateral flank- linear hyperpigmentation, skin intact.  Sacrum/gluteal cleft and bilateral buttocks Unstageable pressure injury complicated by moisture and incontinence (pt turned to right side)  -Sacral/gluteal cleft- 8.5cmx3.5cmx4.5cm, no undermining or tunneling noted.   -Tissue type predominantly grey-tan softening, adherent slough.  -Small-moderate serofibrinous drainage, no purulent drainage, no odor.  -Shallow ulcerations to bilateral buttocks extending from wound edge:   Right buttock shallow ulceration- 11cmx8.5cmx0.2cm   Left buttock shallow ulceration: [id number]  Tissue type of satellite ulcerations 100% pink-moist dermis with scattered fibrinous exudate.  Periwound skin with area of nonfluctuant induration extending from edge of right buttock ulceration from 12-3 o'clock measuring 5cmx5.5cm, remaining periwound skin without obvious erythema, no edema, no increased warmth, no induration, no fluctuance, no crepitus.            LABS/ CULTURES/ RADIOLOGY:              8.8    16.53 >-----------<  402      [03-30-25 @ 23:52]              29.0     131  |  88  |  7   ----------------------------<  108      [03-30-25 @ 23:52]  4.0   |  29  |  0.85        Ca     8.9     [03-30-25 @ 23:52]      Mg     1.40     [03-30-25 @ 08:21]      Phos  4.7     [03-30-25 @ 08:21]    TPro  7.5  /  Alb  2.7  /  TBili  0.7  /  DBili  x   /  AST  15  /  ALT  13  /  AlkPhos  57  [03-30-25 @ 23:52]          CAPILLARY BLOOD GLUCOSE      POCT Blood Glucose.: 125 mg/dL (31 Mar 2025 07:47)  POCT Blood Glucose.: 107 mg/dL (30 Mar 2025 21:23)  POCT Blood Glucose.: 96 mg/dL (30 Mar 2025 17:05)      A1C with Estimated Average Glucose Result: 6.7 % (02-25-25 @ 05:44)        ACC: [id number] EXAM:  CT PELVIS ONLY IC   ORDERED BY: ANTONIA SU     PROCEDURE DATE:  03/29/2025          INTERPRETATION:  CLINICAL INFORMATION: Evaluation of sacral wound.    COMPARISON: CT abdomen pelvis 3/17/2025    CONTRAST/COMPLICATIONS:  IV Contrast: Omnipaque 350  120 cc administered   30 cc discarded  Oral Contrast: NONE  .    PROCEDURE:  CT of the Pelvis was performed.  Sagittal and coronal reformats were performed.    FINDINGS:  BLADDER: Within normal limits.  REPRODUCTIVE ORGANS: Prostate within normal limits.  LYMPH NODES: No pelvic lymphadenopathy.    VISUALIZED PORTIONS:  ABDOMINAL ORGANS: Within normal limits.  BOWEL: Within normal limits.  PERITONEUM/RETROPERITONEUM: Within normal limits.  VESSELS: Within normal limits.  ABDOMINAL WALL: Superficial midline skin thickening overlying the region   of the sacrum, in keeping with the clinical history of sacral wound. Few   bubbles of gas along the skin, possibly related to ulceration. Underlying   subcutaneous edema is present, without evidence of tract or drainable   fluid collection.    Skin thickening and subcutaneous edema along the left lower anterior   abdominal wall pannus, decreased compared to prior exam.    BONES: Within normal limits.    IMPRESSION:  *  Superficial skin thickening overlying the sacrum with underlying edema   of the subcutaneous fat, in keeping with the clinical history of sacral   wound.  *  No evidence of underlying tract or drainable fluid collection.    --- End of Report ---          AMANDA JOHNSON MD; Resident Radiologist  This document has been electronically signed.  LYLY JAVIER MD; Attending Radiologist  This document has been electronically signed. Mar 30 2025 11:28AM      ACC: [id number] EXAM:  MR SPINE LUMBAR   ORDERED BY: ANTONIA SU     PROCEDURE DATE:  03/30/2025          INTERPRETATION:  CLINICAL INFORMATION: Low back pain    ADDITIONAL CLINICAL INFORMATION: Not Applicable    TECHNIQUE: Multiplanar, multisequence MRI was performed of the lumbar   spine.  IV Contrast:    PRIOR STUDIES: No priors available for comparison.    FINDINGS:  Patient unable to complete axial imaging due to patient pain.    LOCALIZER: No additional findings.  BONES: There is no fracture or bone marrow edema.  ALIGNMENT: The alignment is normal.  SACROILIAC JOINTS/SACRUM: There is no sacral fracture. The SI joints are   partially visualized but are intact.  CONUS AND CAUDA EQUINA: The distal cord and conus are normal in signal.   Conus terminates at L1.  VISUALIZED INTRAPELVIC/INTRA-ABDOMINAL SOFT TISSUES: Normal.  PARASPINAL SOFT TISSUES: Normal.      INDIVIDUAL LEVELS:    LOWER THORACIC SPINE: No spinal canal or neuroforaminal stenosis.    L1-L2: No spinal canal or neuroforaminal stenosis.  L2-L3: No spinal canal or neuroforaminal stenosis.  L3-L4: Mild disc degeneration with minimal loss of disc height and signal   within the nucleus pulposus. Mild bulge flattens the ventral thecal sac   and minimally narrows the BILATERAL neural foramina . No spinal canal   stenosis.  L4-L5: Moderate disc degeneration with loss of disc height and signal   within the nucleus pulposus. Disc bulge flattens the ventral thecal sac   and moderately narrows the BILATERAL neural foramina.Superimposed small   central disc herniation further compresses the ventral thecal sac   contributing to mild central stenosis.  L5-S1: No spinal canal or neuroforaminal stenosis.      IMPRESSION:    Limited examination due to lack of axial imaging. Mild L3-4 and moderate   L4-5 disc degeneration with bulges that narrow the BILATERAL neural   foramina due to a mild degree at L3-4 and moderate degree at L4-5.   Superimposed central disc herniation at L4-5 contributes to mild central   stenosis at this level.    --- End of Report ---            RAGHAV GORDILLO MD; Attending Radiologist  This document has been electronically signed. Mar 31 2025  7:08AM

## 2025-03-31 NOTE — PROGRESS NOTE ADULT - ASSESSMENT
This is a 38 y/o M w/ PMHx AF (not on AC), HTN, BEA, pseudotumor cerebri s/p LP in 2024, initially presented to [facility] on 2/24, SOB, LE edema with URI symptoms and sick contacts, noted to have severe ARDS, intubated however still hypoxia, cannulated for VV ECMO on 2/25, transferred to [facility] on 2/25, started on empiric Vanco, Zosyn for multifocal PNA, abdominal wall cellulitis, s/p trach placement by CT surgery on 3/7, ECMO decannulated on 3/7. Zosyn held on 3/9.  C/b fevers on 3/10, restarted on Zosyn, transferred to [facility] on 3/13, Bcx now w/ yeast.    #Candida albicans fungemia   #Fevers   #Multifocal PNA, abdominal wall cellulitis s/p Vanco/Zosyn  #ARDS, hypoxic respiratory failure requiring VV EMCO 2/2 multifocal PNA? s/p decannulation on 3/7    Overall, 38 y/o M w/ PMHx AF (not on AC), HTN, BEA, pseudotumor cerebri s/p LP in 2024 admitted to [facility] on 2/26 for ARDS, hypoxic respiratory failure requiring VV EMCO 2/2 multifocal PNA? s/p decannulation on 3/7, c/b abdominal wall cellulitis s/p Vancomycin/Zosyn, s/p trach on 3/7, in the setting of persistent fevers despite Zosyn, BCx now w/ yeast, Candida albicans   Unclear source for yeast in BCx, pt had all central lines removed on 3/7, not getting TPN, no abdominal pain on exam, PEG site well appearing.     CT A/P w/o clear abdominal source of fungemia, possibly 2/2 abdominal wall cellulitis? TTE w/o IE.  BCx 3/18 1/2 positive, 3/20 NGTD x 2.     Pt with worsening sepsis on 3/30, febrile to 103, WBC 16.   D/w wound care, sacral wound indurated, but no fluctuance does not appear infected.   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   Unclear source at this time    Recommendations;   1. C/w Vancomycin, Zosyn 3.375 g 8 for now,   2. Caspofungin 70 mg daily (given weight) (would avoid fluconazole for now given high dose needed). Would plan for 2 week course after negative BCx  (if 3/20 remains NG, then 4/2/25)  3. U/A without pyuria, F/u BCx     Thank you for consulting us and involving us in the management of this patient's case. In addition to reviewing history, imaging, documents, labs, microbiology, and infection control strategies and potential issues.     ID will continue to follow    Shailesh Owens M.D.  Attending Physician  Division of Infectious Diseases  Department of Medicine    Please contact through MS Teams message.  Office: [phone] (after 5 PM or weekend)

## 2025-03-31 NOTE — PROGRESS NOTE ADULT - ASSESSMENT
39M presents with R foot plantar forefoot hematoma  - Patient seen and evaluated  - Afebrile, WBC 13.13  - Right foot digits 4 & 5 partial thickness dry gangrene. Right foot plantar lateral forefoot wound to subQ, no malodor, no pus, no acute signs of infection. Left foot digit 1,2,4 partial thickness dry gangrene. Left foot lateral plantar forefoot wound to subQ, no malodor, no pus, no acute signs of infection.  - No culture taken secondary to no acute signs of infection   - Wound care orders placed for daily dressing changes   - No acute pod intervention, local wound care only   - Wound care instruction and follow up information in discharge note provider  - Discussed with attending

## 2025-03-31 NOTE — PROGRESS NOTE ADULT - NS ATTEND AMEND GEN_ALL_CORE FT
Pt is a 39M with MHx obesity (Class III, BMI 69), pAfib (no AC), HTN, BEA (dz'ed 2019, AHI 34 non-compliant on CPAP set at 10), and history of b/l papilledema attributed to pseudotumor cerebri initially presenting as a transfer [facility] on 2/26 for acute hypoxemic respiratory failure s/p ETT intubation/MV with progression to refractory ARDS s/p initiation of vv-ECMO support (2/26-3/7) with failure to wean from ventilator s/p tracheostomy placement, further found to have RV failure s/p aggressive diuresis and PNA followed by severe sepsis 2/2 panniculitis c/b candida albicans fungemia now transferred to [facility] for further medical management.     Pt now awake and alert, spontaneously communicative and at mental status baseline. Continues to have severe functional debility likely 2/2 prolonged critical illness polyneuropathy but is progressing well. Now able to transfer via [place] to chair daily and is independent of UE and fine motor function. Off all sedation. Weaning of catapres initiated 2/25 (decreased to 0.2mg TID.) Weaning pain control, pt remains dependent on Dilaudid prn for LE neuropathic pain. PO regimen added and gabapentin increased without benefit. Pain mgmt team consulted, recs appreciated. Yesterday there was concern for worsening coolness of toes i/s/o severe pain. Podiatry consulted, possible worsening of right 5th digit that may require podiatric sx. Will CTM for now.      Pt with acute combined hypoxemic and hypercapnic respiratory failure with failure to wean from ventilator s/p tracheostomy placement. Now tolerating TC QD, PSV (18/10) qhs. Now on TC trial, using oral interface for qhs Bilevel support. Airway clearance therapy in place. Wean O2 supplementation for goal O2 saturation 90-95%. Aspiration precautions and oral hygiene in place. Decannulated at bedside 3/28.     Pt further presenting with acute on chronic hypercapnia and will require Bilevel after discharge for chronic use 2/2 chronic hypercapnic respiratory failure from hypoventilation and obesity hypoventilation syndrome (BMI 69.) Pt has exhibited an inability to maintain appropriate ventilation with persistent hypercapnia without Bilevel support qhs. Pt has high CO2 levels (PaCO2 on ABG >52.) PaCO2 done immediately upon awakening while breathing his prescribed FiO2 shows the patient's PaCO2 worsened >7mmHg compared to initial ABG. Patient's OHS requires ventilatory support. Due to the patient's severe disease state and severe dyspnea, Bilevel is necessary. Due to the patient's decreased ventilatory drive and high CO2 levels, without Bilevel support patient will experience rapid clinical deterioration, which will lead to serious medical harm. Acute on chronic hypercapnic respiratory failure due to patient (BMI 69) obesity, which is causing restriction of the thoracic cage not allowing for proper gas exchange.     Pt initially p/w RV failure 2/2 pulmHTN now s/p aggressive diuresis with improvement. Now transitioned to PO diuretics with goal to maintain euvolemia. Acetazolamide added today. AFib well controlled conservatively, will continue to monitor on telemetry. TTE 2/25 with new RVE, 3/11 with RVSD. DVT noted on repeat duplex of the right IJ and bilateral peroneal veins. Will c/w full A/C on Eliquis for DVT and AFib.     Pt with oropharyngeal dysphagia s/p PEG placement (3/7) now transitioned to PO diet (3/20) via FEEST. Will continue to monitor on soft diet. Bowel regimen in place prn. PPI ppx. Transaminitis improving.     Pt further found to have severe sepsis 2/ candida albicans fungemia possibly from panniculitis (3/15, cleared 3/18) now on caspofungin with plan to complete 14 day course from clearance. TTE (-) endocarditis. Wound care team following. ID recs appreciated. Further found to have worsening sacral ulceration with induration, CT A/P and MRI spine performed without evidence of underlying tract or drainable fluid collection. Now with acute onset fevers, although without new s/s or complaints and with unclear source. Cx resent, empiric abx initiated. Will CTM closely and f/u repeat cx.     Dispo pending medical optimization. Pt full code. DVT ppx in place. Palliative consulted, recs appreciated. Will continue to update family and discuss plan of care throughout hospitalization.

## 2025-03-31 NOTE — PROGRESS NOTE ADULT - SUBJECTIVE AND OBJECTIVE BOX
Patient is a 39y old  Male who presents with a chief complaint of sob (02 Mar 2025 06:19)       INTERVAL HPI/OVERNIGHT EVENTS:  Patient seen and evaluated at bedside.  Pt is resting comfortable in NAD. Denies N/V/F/C.    Allergies    No Known Allergies    Intolerances        Vital Signs Last 24 Hrs  T(C): 37.4 (31 Mar 2025 05:24), Max: 39.6 (30 Mar 2025 22:02)  T(F): 99.3 (31 Mar 2025 05:24), Max: 103.2 (30 Mar 2025 22:02)  HR: 118 (31 Mar 2025 09:48) (106 - 133)  BP: 125/68 (31 Mar 2025 05:24) (117/52 - 137/88)  BP(mean): 85 (31 Mar 2025 05:24) (71 - 85)  RR: 23 (31 Mar 2025 05:24) (18 - 23)  SpO2: 98% (31 Mar 2025 09:48) (94% - 100%)    Parameters below as of 31 Mar 2025 09:48  Patient On (Oxygen Delivery Method): nasal cannula        LABS:                        8.8    16.53 )-----------( 402      ( 30 Mar 2025 23:52 )             29.0     03-30    131[L]  |  88[L]  |  7   ----------------------------<  108[H]  4.0   |  29  |  0.85    Ca    8.9      30 Mar 2025 23:52  Phos  4.7     03-30  Mg     1.40     03-30    TPro  7.5  /  Alb  2.7[L]  /  TBili  0.7  /  DBili  x   /  AST  15  /  ALT  13  /  AlkPhos  57  03-30      Urinalysis Basic - ( 31 Mar 2025 08:08 )    Color: Yellow / Appearance: Cloudy / S.010 / pH: x  Gluc: x / Ketone: 15 mg/dL  / Bili: Negative / Urobili: 1.0 mg/dL   Blood: x / Protein: 30 mg/dL / Nitrite: Negative   Leuk Esterase: Small / RBC: 1 /HPF / WBC 5 /HPF   Sq Epi: x / Non Sq Epi: 2 /HPF / Bacteria: Many /HPF      CAPILLARY BLOOD GLUCOSE      POCT Blood Glucose.: 125 mg/dL (31 Mar 2025 07:47)  POCT Blood Glucose.: 107 mg/dL (30 Mar 2025 21:23)  POCT Blood Glucose.: 96 mg/dL (30 Mar 2025 17:05)  POCT Blood Glucose.: 123 mg/dL (30 Mar 2025 11:19)      Lower Extremity Physical Exam:  Vascular: DP 1/4 B/L, PT 0/4, B/L secondary to +3 pitting edema, CFT <3 seconds B/L, Temperature gradient warm to cool, B/L.   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 Right foot plantar lateral forefoot wound to subQ, no malodor, no pus, no acute signs of infection. Left foot digit 1,2,4 partial thickness dry gangrene. Left foot lateral plantar forefoot wound to subQ, no malodor, no pus, no acute signs of infection.      RADIOLOGY & ADDITIONAL TESTS:

## 2025-03-31 NOTE — PROGRESS NOTE ADULT - SUBJECTIVE AND OBJECTIVE BOX
CHIEF COMPLAINT: CHIEF COMPLAINT:    INTERVAL EVENTS: Pt finding BIPAP difficult - refused overnight    ROS: Seen by bedside during AM rounds     OBJECTIVE:  ICU Vital Signs Last 24 Hrs  T(C): 37.4 (31 Mar 2025 05:24), Max: 39.6 (30 Mar 2025 22:02)  T(F): 99.3 (31 Mar 2025 05:24), Max: 103.2 (30 Mar 2025 22:02)  HR: 111 (31 Mar 2025 05:24) (106 - 133)  BP: 125/68 (31 Mar 2025 05:24) (117/52 - 137/88)  BP(mean): 85 (31 Mar 2025 05:24) (71 - 85)  ABP: --  ABP(mean): --  RR: 23 (31 Mar 2025 05:24) (18 - 23)  SpO2: 98% (31 Mar 2025 05:24) (94% - 100%)    O2 Parameters below as of 31 Mar 2025 05:24  Patient On (Oxygen Delivery Method): nasal cannula  O2 Flow (L/min): 4            03-30 @ 07:01  -  03-31 @ 07:00  --------------------------------------------------------  IN: 0 mL / OUT: 2300 mL / NET: -2300 mL      CAPILLARY BLOOD GLUCOSE      POCT Blood Glucose.: 107 mg/dL (30 Mar 2025 21:23)      PHYSICAL EXAM:  General: NAD   Neck: trachea midline.  Cards: S1S2 RR  no murmurs   Pulm: Clear bilaterally. No wheezes.   Abdomen: Morbidly obese abdomen. Soft, NTND. (+) PEG noted, site c/d/i.   Extremities: Mild b/l LE edema. Extremities warm to touch.   Neurology: AOx3. 5/5 motor strength b/l UE. 4/5 motor strength LLE. Unable to range RLE d/t weakness.   Skin: warm to touch, color appropriate for ethnicity. Refer to RN assessment for further details.        HOSPITAL MEDICATIONS:  MEDICATIONS  (STANDING):  albuterol/ipratropium for Nebulization 3 milliLiter(s) Nebulizer every 6 hours  amLODIPine   Tablet 10 milliGRAM(s) Oral daily  apixaban 5 milliGRAM(s) Oral every 12 hours  caspofungin IVPB 70 milliGRAM(s) IV Intermittent every 24 hours  chlorhexidine 0.12% Liquid 15 milliLiter(s) Swish and Spit two times a day  chlorhexidine 2% Cloths 1 Application(s) Topical <User Schedule>  cloNIDine 0.2 milliGRAM(s) Oral every 8 hours  clotrimazole 1% Cream 1 Application(s) Topical two times a day  Dakins Solution - 1/4 Strength 1 Application(s) Topical daily  dextrose 50% Injectable 25 Gram(s) IV Push once  dextrose 50% Injectable 12.5 Gram(s) IV Push once  dextrose 50% Injectable 25 Gram(s) IV Push once  furosemide    Tablet 40 milliGRAM(s) Oral two times a day  gabapentin 400 milliGRAM(s) Oral every 8 hours  glucagon  Injectable 1 milliGRAM(s) IntraMuscular once  insulin lispro (ADMELOG) corrective regimen sliding scale   SubCutaneous three times a day before meals  insulin lispro (ADMELOG) corrective regimen sliding scale   SubCutaneous at bedtime  lidocaine   4% Patch 1 Patch Transdermal daily  lidocaine   4% Patch 1 Patch Transdermal daily  melatonin 3 milliGRAM(s) Oral at bedtime  multivitamin 1 Tablet(s) Oral daily  mupirocin 2% Ointment 1 Application(s) Topical two times a day  pantoprazole    Tablet 40 milliGRAM(s) Oral before breakfast  piperacillin/tazobactam IVPB.- 3.375 Gram(s) IV Intermittent once  piperacillin/tazobactam IVPB.- 3.375 Gram(s) IV Intermittent once  piperacillin/tazobactam IVPB.. 3.375 Gram(s) IV Intermittent every 8 hours  polyethylene glycol 3350 17 Gram(s) Oral daily  QUEtiapine 25 milliGRAM(s) Oral at bedtime  senna 2 Tablet(s) Oral at bedtime  vancomycin  IVPB 1500 milliGRAM(s) IV Intermittent once    MEDICATIONS  (PRN):  acetaminophen     Tablet .. 650 milliGRAM(s) Oral every 6 hours PRN Temp greater or equal to 38C (100.4F), Mild Pain (1 - 3), Moderate Pain (4 - 6)  dextrose Oral Gel 15 Gram(s) Oral once PRN Blood Glucose LESS THAN 70 milliGRAM(s)/deciliter  HYDROmorphone  Injectable 0.5 milliGRAM(s) IV Push every 6 hours PRN MOd-Severe Pain (4 - 10)  oxyCODONE    IR 10 milliGRAM(s) Oral every 4 hours PRN MOD/SEVERE PAIN 4-10      LABS:                        8.8    16.53 )-----------( 402      ( 30 Mar 2025 23:52 )             29.0     03-30    131[L]  |  88[L]  |  7   ----------------------------<  108[H]  4.0   |  29  |  0.85    Ca    8.9      30 Mar 2025 23:52  Phos  4.7     03-30  Mg     1.40     03-30    TPro  7.5  /  Alb  2.7[L]  /  TBili  0.7  /  DBili  x   /  AST  15  /  ALT  13  /  AlkPhos  57  03-30      Urinalysis Basic - ( 30 Mar 2025 23:52 )    Color: x / Appearance: x / SG: x / pH: x  Gluc: 108 mg/dL / Ketone: x  / Bili: x / Urobili: x   Blood: x / Protein: x / Nitrite: x   Leuk Esterase: x / RBC: x / WBC x   Sq Epi: x / Non Sq Epi: x / Bacteria: x        Venous Blood Gas:  03-30 @ 23:52  --/--/--/--/--  VBG Lactate: 1.3      INTERVAL EVENTS:     ROS: Seen by bedside during AM rounds     OBJECTIVE:  ICU Vital Signs Last 24 Hrs  T(C): 37.4 (31 Mar 2025 05:24), Max: 39.6 (30 Mar 2025 22:02)  T(F): 99.3 (31 Mar 2025 05:24), Max: 103.2 (30 Mar 2025 22:02)  HR: 111 (31 Mar 2025 05:24) (106 - 133)  BP: 125/68 (31 Mar 2025 05:24) (117/52 - 137/88)  BP(mean): 85 (31 Mar 2025 05:24) (71 - 85)  ABP: --  ABP(mean): --  RR: 23 (31 Mar 2025 05:24) (18 - 23)  SpO2: 98% (31 Mar 2025 05:24) (94% - 100%)    O2 Parameters below as of 31 Mar 2025 05:24  Patient On (Oxygen Delivery Method): nasal cannula  O2 Flow (L/min): 4            03-30 @ 07:01  -  03-31 @ 07:00  --------------------------------------------------------  IN: 0 mL / OUT: 2300 mL / NET: -2300 mL      CAPILLARY BLOOD GLUCOSE      POCT Blood Glucose.: 107 mg/dL (30 Mar 2025 21:23)      PHYSICAL EXAM:  General:   HEENT:   Lymph Nodes:  Neck:   Respiratory:   Cardiovascular:   Abdomen:   Extremities:   Skin:   Neurological:  Psychiatry:        HOSPITAL MEDICATIONS:  MEDICATIONS  (STANDING):  albuterol/ipratropium for Nebulization 3 milliLiter(s) Nebulizer every 6 hours  amLODIPine   Tablet 10 milliGRAM(s) Oral daily  apixaban 5 milliGRAM(s) Oral every 12 hours  caspofungin IVPB 70 milliGRAM(s) IV Intermittent every 24 hours  chlorhexidine 0.12% Liquid 15 milliLiter(s) Swish and Spit two times a day  chlorhexidine 2% Cloths 1 Application(s) Topical <User Schedule>  cloNIDine 0.2 milliGRAM(s) Oral every 8 hours  clotrimazole 1% Cream 1 Application(s) Topical two times a day  Dakins Solution - 1/4 Strength 1 Application(s) Topical daily  dextrose 50% Injectable 25 Gram(s) IV Push once  dextrose 50% Injectable 12.5 Gram(s) IV Push once  dextrose 50% Injectable 25 Gram(s) IV Push once  furosemide    Tablet 40 milliGRAM(s) Oral two times a day  gabapentin 400 milliGRAM(s) Oral every 8 hours  glucagon  Injectable 1 milliGRAM(s) IntraMuscular once  insulin lispro (ADMELOG) corrective regimen sliding scale   SubCutaneous three times a day before meals  insulin lispro (ADMELOG) corrective regimen sliding scale   SubCutaneous at bedtime  lidocaine   4% Patch 1 Patch Transdermal daily  lidocaine   4% Patch 1 Patch Transdermal daily  melatonin 3 milliGRAM(s) Oral at bedtime  multivitamin 1 Tablet(s) Oral daily  mupirocin 2% Ointment 1 Application(s) Topical two times a day  pantoprazole    Tablet 40 milliGRAM(s) Oral before breakfast  piperacillin/tazobactam IVPB.- 3.375 Gram(s) IV Intermittent once  piperacillin/tazobactam IVPB.- 3.375 Gram(s) IV Intermittent once  piperacillin/tazobactam IVPB.. 3.375 Gram(s) IV Intermittent every 8 hours  polyethylene glycol 3350 17 Gram(s) Oral daily  QUEtiapine 25 milliGRAM(s) Oral at bedtime  senna 2 Tablet(s) Oral at bedtime  vancomycin  IVPB 1500 milliGRAM(s) IV Intermittent once    MEDICATIONS  (PRN):  acetaminophen     Tablet .. 650 milliGRAM(s) Oral every 6 hours PRN Temp greater or equal to 38C (100.4F), Mild Pain (1 - 3), Moderate Pain (4 - 6)  dextrose Oral Gel 15 Gram(s) Oral once PRN Blood Glucose LESS THAN 70 milliGRAM(s)/deciliter  HYDROmorphone  Injectable 0.5 milliGRAM(s) IV Push every 6 hours PRN MOd-Severe Pain (4 - 10)  oxyCODONE    IR 10 milliGRAM(s) Oral every 4 hours PRN MOD/SEVERE PAIN 4-10      LABS:                        8.8    16.53 )-----------( 402      ( 30 Mar 2025 23:52 )             29.0     03-30    131[L]  |  88[L]  |  7   ----------------------------<  108[H]  4.0   |  29  |  0.85    Ca    8.9      30 Mar 2025 23:52  Phos  4.7     03-30  Mg     1.40     03-30    TPro  7.5  /  Alb  2.7[L]  /  TBili  0.7  /  DBili  x   /  AST  15  /  ALT  13  /  AlkPhos  57  03-30      Urinalysis Basic - ( 30 Mar 2025 23:52 )    Color: x / Appearance: x / SG: x / pH: x  Gluc: 108 mg/dL / Ketone: x  / Bili: x / Urobili: x   Blood: x / Protein: x / Nitrite: x   Leuk Esterase: x / RBC: x / WBC x   Sq Epi: x / Non Sq Epi: x / Bacteria: x        Venous Blood Gas:  03-30 @ 23:52  --/--/--/--/--  VBG Lactate: 1.3   CHIEF COMPLAINT: CHIEF COMPLAINT:    INTERVAL EVENTS: Pt finding BIPAP difficult -    ROS: Seen by bedside during AM rounds     OBJECTIVE:  ICU Vital Signs Last 24 Hrs  T(C): 37.4 (31 Mar 2025 05:24), Max: 39.6 (30 Mar 2025 22:02)  T(F): 99.3 (31 Mar 2025 05:24), Max: 103.2 (30 Mar 2025 22:02)  HR: 111 (31 Mar 2025 05:24) (106 - 133)  BP: 125/68 (31 Mar 2025 05:24) (117/52 - 137/88)  BP(mean): 85 (31 Mar 2025 05:24) (71 - 85)  ABP: --  ABP(mean): --  RR: 23 (31 Mar 2025 05:24) (18 - 23)  SpO2: 98% (31 Mar 2025 05:24) (94% - 100%)    O2 Parameters below as of 31 Mar 2025 05:24  Patient On (Oxygen Delivery Method): nasal cannula  O2 Flow (L/min): 4            03-30 @ 07:01  -  03-31 @ 07:00  --------------------------------------------------------  IN: 0 mL / OUT: 2300 mL / NET: -2300 mL      CAPILLARY BLOOD GLUCOSE      POCT Blood Glucose.: 107 mg/dL (30 Mar 2025 21:23)      PHYSICAL EXAM:  General: NAD   Neck: trachea midline.  Cards: S1S2 RR  no murmurs   Pulm: Clear bilaterally. No wheezes.   Abdomen: Morbidly obese abdomen. Soft, NTND. (+) PEG noted, site c/d/i.   Extremities: Mild b/l LE edema. Extremities warm to touch.   Neurology: AOx3. 5/5 motor strength b/l UE. 4/5 motor strength LLE. Unable to range RLE d/t weakness.   Skin: warm to touch, color appropriate for ethnicity. Refer to RN assessment for further details.        HOSPITAL MEDICATIONS:  MEDICATIONS  (STANDING):  albuterol/ipratropium for Nebulization 3 milliLiter(s) Nebulizer every 6 hours  amLODIPine   Tablet 10 milliGRAM(s) Oral daily  apixaban 5 milliGRAM(s) Oral every 12 hours  caspofungin IVPB 70 milliGRAM(s) IV Intermittent every 24 hours  chlorhexidine 0.12% Liquid 15 milliLiter(s) Swish and Spit two times a day  chlorhexidine 2% Cloths 1 Application(s) Topical <User Schedule>  cloNIDine 0.2 milliGRAM(s) Oral every 8 hours  clotrimazole 1% Cream 1 Application(s) Topical two times a day  Dakins Solution - 1/4 Strength 1 Application(s) Topical daily  dextrose 50% Injectable 25 Gram(s) IV Push once  dextrose 50% Injectable 12.5 Gram(s) IV Push once  dextrose 50% Injectable 25 Gram(s) IV Push once  furosemide    Tablet 40 milliGRAM(s) Oral two times a day  gabapentin 400 milliGRAM(s) Oral every 8 hours  glucagon  Injectable 1 milliGRAM(s) IntraMuscular once  insulin lispro (ADMELOG) corrective regimen sliding scale   SubCutaneous three times a day before meals  insulin lispro (ADMELOG) corrective regimen sliding scale   SubCutaneous at bedtime  lidocaine   4% Patch 1 Patch Transdermal daily  lidocaine   4% Patch 1 Patch Transdermal daily  melatonin 3 milliGRAM(s) Oral at bedtime  multivitamin 1 Tablet(s) Oral daily  mupirocin 2% Ointment 1 Application(s) Topical two times a day  pantoprazole    Tablet 40 milliGRAM(s) Oral before breakfast  piperacillin/tazobactam IVPB.- 3.375 Gram(s) IV Intermittent once  piperacillin/tazobactam IVPB.- 3.375 Gram(s) IV Intermittent once  piperacillin/tazobactam IVPB.. 3.375 Gram(s) IV Intermittent every 8 hours  polyethylene glycol 3350 17 Gram(s) Oral daily  QUEtiapine 25 milliGRAM(s) Oral at bedtime  senna 2 Tablet(s) Oral at bedtime  vancomycin  IVPB 1500 milliGRAM(s) IV Intermittent once    MEDICATIONS  (PRN):  acetaminophen     Tablet .. 650 milliGRAM(s) Oral every 6 hours PRN Temp greater or equal to 38C (100.4F), Mild Pain (1 - 3), Moderate Pain (4 - 6)  dextrose Oral Gel 15 Gram(s) Oral once PRN Blood Glucose LESS THAN 70 milliGRAM(s)/deciliter  HYDROmorphone  Injectable 0.5 milliGRAM(s) IV Push every 6 hours PRN MOd-Severe Pain (4 - 10)  oxyCODONE    IR 10 milliGRAM(s) Oral every 4 hours PRN MOD/SEVERE PAIN 4-10      LABS:                        8.8    16.53 )-----------( 402      ( 30 Mar 2025 23:52 )             29.0     03-30    131[L]  |  88[L]  |  7   ----------------------------<  108[H]  4.0   |  29  |  0.85    Ca    8.9      30 Mar 2025 23:52  Phos  4.7     03-30  Mg     1.40     03-30    TPro  7.5  /  Alb  2.7[L]  /  TBili  0.7  /  DBili  x   /  AST  15  /  ALT  13  /  AlkPhos  57  03-30      Urinalysis Basic - ( 30 Mar 2025 23:52 )    Color: x / Appearance: x / SG: x / pH: x  Gluc: 108 mg/dL / Ketone: x  / Bili: x / Urobili: x   Blood: x / Protein: x / Nitrite: x   Leuk Esterase: x / RBC: x / WBC x   Sq Epi: x / Non Sq Epi: x / Bacteria: x        Venous Blood Gas:  03-30 @ 23:52  --/--/--/--/--  VBG Lactate: 1.3      INTERVAL EVENTS:     ROS: Seen by bedside during AM rounds     OBJECTIVE:  ICU Vital Signs Last 24 Hrs  T(C): 37.4 (31 Mar 2025 05:24), Max: 39.6 (30 Mar 2025 22:02)  T(F): 99.3 (31 Mar 2025 05:24), Max: 103.2 (30 Mar 2025 22:02)  HR: 111 (31 Mar 2025 05:24) (106 - 133)  BP: 125/68 (31 Mar 2025 05:24) (117/52 - 137/88)  BP(mean): 85 (31 Mar 2025 05:24) (71 - 85)  ABP: --  ABP(mean): --  RR: 23 (31 Mar 2025 05:24) (18 - 23)  SpO2: 98% (31 Mar 2025 05:24) (94% - 100%)    O2 Parameters below as of 31 Mar 2025 05:24  Patient On (Oxygen Delivery Method): nasal cannula  O2 Flow (L/min): 4            03-30 @ 07:01  -  03-31 @ 07:00  --------------------------------------------------------  IN: 0 mL / OUT: 2300 mL / NET: -2300 mL      CAPILLARY BLOOD GLUCOSE      POCT Blood Glucose.: 107 mg/dL (30 Mar 2025 21:23)      PHYSICAL EXAM:  General:   HEENT:   Lymph Nodes:  Neck:   Respiratory:   Cardiovascular:   Abdomen:   Extremities:   Skin:   Neurological:  Psychiatry:        HOSPITAL MEDICATIONS:  MEDICATIONS  (STANDING):  albuterol/ipratropium for Nebulization 3 milliLiter(s) Nebulizer every 6 hours  amLODIPine   Tablet 10 milliGRAM(s) Oral daily  apixaban 5 milliGRAM(s) Oral every 12 hours  caspofungin IVPB 70 milliGRAM(s) IV Intermittent every 24 hours  chlorhexidine 0.12% Liquid 15 milliLiter(s) Swish and Spit two times a day  chlorhexidine 2% Cloths 1 Application(s) Topical <User Schedule>  cloNIDine 0.2 milliGRAM(s) Oral every 8 hours  clotrimazole 1% Cream 1 Application(s) Topical two times a day  Dakins Solution - 1/4 Strength 1 Application(s) Topical daily  dextrose 50% Injectable 25 Gram(s) IV Push once  dextrose 50% Injectable 12.5 Gram(s) IV Push once  dextrose 50% Injectable 25 Gram(s) IV Push once  furosemide    Tablet 40 milliGRAM(s) Oral two times a day  gabapentin 400 milliGRAM(s) Oral every 8 hours  glucagon  Injectable 1 milliGRAM(s) IntraMuscular once  insulin lispro (ADMELOG) corrective regimen sliding scale   SubCutaneous three times a day before meals  insulin lispro (ADMELOG) corrective regimen sliding scale   SubCutaneous at bedtime  lidocaine   4% Patch 1 Patch Transdermal daily  lidocaine   4% Patch 1 Patch Transdermal daily  melatonin 3 milliGRAM(s) Oral at bedtime  multivitamin 1 Tablet(s) Oral daily  mupirocin 2% Ointment 1 Application(s) Topical two times a day  pantoprazole    Tablet 40 milliGRAM(s) Oral before breakfast  piperacillin/tazobactam IVPB.- 3.375 Gram(s) IV Intermittent once  piperacillin/tazobactam IVPB.- 3.375 Gram(s) IV Intermittent once  piperacillin/tazobactam IVPB.. 3.375 Gram(s) IV Intermittent every 8 hours  polyethylene glycol 3350 17 Gram(s) Oral daily  QUEtiapine 25 milliGRAM(s) Oral at bedtime  senna 2 Tablet(s) Oral at bedtime  vancomycin  IVPB 1500 milliGRAM(s) IV Intermittent once    MEDICATIONS  (PRN):  acetaminophen     Tablet .. 650 milliGRAM(s) Oral every 6 hours PRN Temp greater or equal to 38C (100.4F), Mild Pain (1 - 3), Moderate Pain (4 - 6)  dextrose Oral Gel 15 Gram(s) Oral once PRN Blood Glucose LESS THAN 70 milliGRAM(s)/deciliter  HYDROmorphone  Injectable 0.5 milliGRAM(s) IV Push every 6 hours PRN MOd-Severe Pain (4 - 10)  oxyCODONE    IR 10 milliGRAM(s) Oral every 4 hours PRN MOD/SEVERE PAIN 4-10      LABS:                        8.8    16.53 )-----------( 402      ( 30 Mar 2025 23:52 )             29.0     03-30    131[L]  |  88[L]  |  7   ----------------------------<  108[H]  4.0   |  29  |  0.85    Ca    8.9      30 Mar 2025 23:52  Phos  4.7     03-30  Mg     1.40     03-30    TPro  7.5  /  Alb  2.7[L]  /  TBili  0.7  /  DBili  x   /  AST  15  /  ALT  13  /  AlkPhos  57  03-30      Urinalysis Basic - ( 30 Mar 2025 23:52 )    Color: x / Appearance: x / SG: x / pH: x  Gluc: 108 mg/dL / Ketone: x  / Bili: x / Urobili: x   Blood: x / Protein: x / Nitrite: x   Leuk Esterase: x / RBC: x / WBC x   Sq Epi: x / Non Sq Epi: x / Bacteria: x        Venous Blood Gas:  03-30 @ 23:52  --/--/--/--/--  VBG Lactate: 1.3

## 2025-04-01 LAB
ANION GAP SERPL CALC-SCNC: 10 MMOL/L — SIGNIFICANT CHANGE UP (ref 7–14)
BASOPHILS # BLD AUTO: 0.02 K/UL — SIGNIFICANT CHANGE UP (ref 0–0.2)
BASOPHILS NFR BLD AUTO: 0.1 % — SIGNIFICANT CHANGE UP (ref 0–2)
BUN SERPL-MCNC: 5 MG/DL — LOW (ref 7–23)
CALCIUM SERPL-MCNC: 8.6 MG/DL — SIGNIFICANT CHANGE UP (ref 8.4–10.5)
CHLORIDE SERPL-SCNC: 92 MMOL/L — LOW (ref 98–107)
CO2 SERPL-SCNC: 31 MMOL/L — SIGNIFICANT CHANGE UP (ref 22–31)
CREAT SERPL-MCNC: 0.82 MG/DL — SIGNIFICANT CHANGE UP (ref 0.5–1.3)
EGFR: 116 ML/MIN/1.73M2 — SIGNIFICANT CHANGE UP
EGFR: 116 ML/MIN/1.73M2 — SIGNIFICANT CHANGE UP
EOSINOPHIL # BLD AUTO: 0.06 K/UL — SIGNIFICANT CHANGE UP (ref 0–0.5)
EOSINOPHIL NFR BLD AUTO: 0.4 % — SIGNIFICANT CHANGE UP (ref 0–6)
GLUCOSE BLDC GLUCOMTR-MCNC: 104 MG/DL — HIGH (ref 70–99)
GLUCOSE BLDC GLUCOMTR-MCNC: 115 MG/DL — HIGH (ref 70–99)
GLUCOSE BLDC GLUCOMTR-MCNC: 145 MG/DL — HIGH (ref 70–99)
GLUCOSE BLDC GLUCOMTR-MCNC: 97 MG/DL — SIGNIFICANT CHANGE UP (ref 70–99)
GLUCOSE SERPL-MCNC: 96 MG/DL — SIGNIFICANT CHANGE UP (ref 70–99)
HCT VFR BLD CALC: 24.3 % — LOW (ref 39–50)
HGB BLD-MCNC: 8.1 G/DL — LOW (ref 13–17)
IANC: 10.87 K/UL — HIGH (ref 1.8–7.4)
IMM GRANULOCYTES NFR BLD AUTO: 0.9 % — SIGNIFICANT CHANGE UP (ref 0–0.9)
LEGIONELLA AG UR QL: NEGATIVE — SIGNIFICANT CHANGE UP
LYMPHOCYTES # BLD AUTO: 1.82 K/UL — SIGNIFICANT CHANGE UP (ref 1–3.3)
LYMPHOCYTES # BLD AUTO: 12.6 % — LOW (ref 13–44)
MAGNESIUM SERPL-MCNC: 1.7 MG/DL — SIGNIFICANT CHANGE UP (ref 1.6–2.6)
MCHC RBC-ENTMCNC: 29.1 PG — SIGNIFICANT CHANGE UP (ref 27–34)
MCHC RBC-ENTMCNC: 33.3 G/DL — SIGNIFICANT CHANGE UP (ref 32–36)
MCV RBC AUTO: 87.4 FL — SIGNIFICANT CHANGE UP (ref 80–100)
MONOCYTES # BLD AUTO: 1.58 K/UL — HIGH (ref 0–0.9)
MONOCYTES NFR BLD AUTO: 10.9 % — SIGNIFICANT CHANGE UP (ref 2–14)
NEUTROPHILS # BLD AUTO: 10.87 K/UL — HIGH (ref 1.8–7.4)
NEUTROPHILS NFR BLD AUTO: 75.1 % — SIGNIFICANT CHANGE UP (ref 43–77)
NRBC # BLD AUTO: 0 K/UL — SIGNIFICANT CHANGE UP (ref 0–0)
NRBC # FLD: 0 K/UL — SIGNIFICANT CHANGE UP (ref 0–0)
NRBC BLD AUTO-RTO: 0 /100 WBCS — SIGNIFICANT CHANGE UP (ref 0–0)
PHOSPHATE SERPL-MCNC: 5.3 MG/DL — HIGH (ref 2.5–4.5)
PLATELET # BLD AUTO: 369 K/UL — SIGNIFICANT CHANGE UP (ref 150–400)
POTASSIUM SERPL-MCNC: 3.6 MMOL/L — SIGNIFICANT CHANGE UP (ref 3.5–5.3)
POTASSIUM SERPL-SCNC: 3.6 MMOL/L — SIGNIFICANT CHANGE UP (ref 3.5–5.3)
RBC # BLD: 2.78 M/UL — LOW (ref 4.2–5.8)
RBC # FLD: 23.5 % — HIGH (ref 10.3–14.5)
S PNEUM AG UR QL: NEGATIVE — SIGNIFICANT CHANGE UP
SODIUM SERPL-SCNC: 133 MMOL/L — LOW (ref 135–145)
VANCOMYCIN FLD-MCNC: <4 UG/ML — SIGNIFICANT CHANGE UP
WBC # BLD: 14.48 K/UL — HIGH (ref 3.8–10.5)
WBC # FLD AUTO: 14.48 K/UL — HIGH (ref 3.8–10.5)

## 2025-04-01 PROCEDURE — 99232 SBSQ HOSP IP/OBS MODERATE 35: CPT

## 2025-04-01 PROCEDURE — 99233 SBSQ HOSP IP/OBS HIGH 50: CPT

## 2025-04-01 PROCEDURE — G0545: CPT

## 2025-04-01 RX ORDER — ACETAMINOPHEN 500 MG/5ML
1000 LIQUID (ML) ORAL ONCE
Refills: 0 | Status: COMPLETED | OUTPATIENT
Start: 2025-04-01 | End: 2025-04-01

## 2025-04-01 RX ORDER — HYDROMORPHONE/SOD CHLOR,ISO/PF 2 MG/10 ML
0.5 SYRINGE (ML) INJECTION ONCE
Refills: 0 | Status: DISCONTINUED | OUTPATIENT
Start: 2025-04-01 | End: 2025-04-01

## 2025-04-01 RX ORDER — VANCOMYCIN HCL IN 5 % DEXTROSE 1.5G/250ML
1250 PLASTIC BAG, INJECTION (ML) INTRAVENOUS EVERY 12 HOURS
Refills: 0 | Status: DISCONTINUED | OUTPATIENT
Start: 2025-04-01 | End: 2025-04-01

## 2025-04-01 RX ORDER — VANCOMYCIN HCL IN 5 % DEXTROSE 1.5G/250ML
PLASTIC BAG, INJECTION (ML) INTRAVENOUS
Refills: 0 | Status: DISCONTINUED | OUTPATIENT
Start: 2025-04-01 | End: 2025-04-01

## 2025-04-01 RX ORDER — VANCOMYCIN HCL IN 5 % DEXTROSE 1.5G/250ML
1250 PLASTIC BAG, INJECTION (ML) INTRAVENOUS EVERY 8 HOURS
Refills: 0 | Status: DISCONTINUED | OUTPATIENT
Start: 2025-04-01 | End: 2025-04-06

## 2025-04-01 RX ORDER — GABAPENTIN 400 MG/1
600 CAPSULE ORAL EVERY 8 HOURS
Refills: 0 | Status: DISCONTINUED | OUTPATIENT
Start: 2025-04-01 | End: 2025-04-02

## 2025-04-01 RX ORDER — VANCOMYCIN HCL IN 5 % DEXTROSE 1.5G/250ML
2000 PLASTIC BAG, INJECTION (ML) INTRAVENOUS ONCE
Refills: 0 | Status: COMPLETED | OUTPATIENT
Start: 2025-04-01 | End: 2025-04-01

## 2025-04-01 RX ORDER — VANCOMYCIN HCL IN 5 % DEXTROSE 1.5G/250ML
2000 PLASTIC BAG, INJECTION (ML) INTRAVENOUS EVERY 12 HOURS
Refills: 0 | Status: DISCONTINUED | OUTPATIENT
Start: 2025-04-01 | End: 2025-04-01

## 2025-04-01 RX ADMIN — Medication 25 GRAM(S): at 13:17

## 2025-04-01 RX ADMIN — IPRATROPIUM BROMIDE AND ALBUTEROL SULFATE 3 MILLILITER(S): .5; 2.5 SOLUTION RESPIRATORY (INHALATION) at 02:38

## 2025-04-01 RX ADMIN — LIDOCAINE HYDROCHLORIDE 1 PATCH: 20 JELLY TOPICAL at 00:21

## 2025-04-01 RX ADMIN — GABAPENTIN 600 MILLIGRAM(S): 400 CAPSULE ORAL at 13:16

## 2025-04-01 RX ADMIN — Medication 0.5 MILLIGRAM(S): at 13:00

## 2025-04-01 RX ADMIN — Medication 0.5 MILLIGRAM(S): at 07:43

## 2025-04-01 RX ADMIN — OXYCODONE HYDROCHLORIDE 10 MILLIGRAM(S): 30 TABLET ORAL at 11:00

## 2025-04-01 RX ADMIN — Medication 25 GRAM(S): at 22:50

## 2025-04-01 RX ADMIN — Medication 0.5 MILLIGRAM(S): at 22:49

## 2025-04-01 RX ADMIN — CLOTRIMAZOLE 1 APPLICATION(S): 1 CREAM TOPICAL at 05:12

## 2025-04-01 RX ADMIN — Medication 1 APPLICATION(S): at 05:10

## 2025-04-01 RX ADMIN — GABAPENTIN 400 MILLIGRAM(S): 400 CAPSULE ORAL at 05:10

## 2025-04-01 RX ADMIN — Medication 3 MILLIGRAM(S): at 22:57

## 2025-04-01 RX ADMIN — Medication 0.1 MILLIGRAM(S): at 05:10

## 2025-04-01 RX ADMIN — Medication 0.1 MILLIGRAM(S): at 22:57

## 2025-04-01 RX ADMIN — Medication 400 MILLIGRAM(S): at 04:05

## 2025-04-01 RX ADMIN — Medication 40 MILLIGRAM(S): at 06:11

## 2025-04-01 RX ADMIN — Medication 400 MILLIGRAM(S): at 22:51

## 2025-04-01 RX ADMIN — POLYETHYLENE GLYCOL 3350 17 GRAM(S): 17 POWDER, FOR SOLUTION ORAL at 12:27

## 2025-04-01 RX ADMIN — OXYCODONE HYDROCHLORIDE 10 MILLIGRAM(S): 30 TABLET ORAL at 10:50

## 2025-04-01 RX ADMIN — Medication 166.67 MILLIGRAM(S): at 22:50

## 2025-04-01 RX ADMIN — Medication 650 MILLIGRAM(S): at 13:15

## 2025-04-01 RX ADMIN — Medication 2 TABLET(S): at 22:57

## 2025-04-01 RX ADMIN — CASPOFUNGIN ACETATE 260 MILLIGRAM(S): 5 INJECTION, POWDER, LYOPHILIZED, FOR SOLUTION INTRAVENOUS at 04:37

## 2025-04-01 RX ADMIN — MUPIROCIN CALCIUM 1 APPLICATION(S): 20 CREAM TOPICAL at 17:31

## 2025-04-01 RX ADMIN — SODIUM HYPOCHLORITE 1 APPLICATION(S): 0.12 SOLUTION TOPICAL at 17:32

## 2025-04-01 RX ADMIN — IPRATROPIUM BROMIDE AND ALBUTEROL SULFATE 3 MILLILITER(S): .5; 2.5 SOLUTION RESPIRATORY (INHALATION) at 14:59

## 2025-04-01 RX ADMIN — Medication 0.5 MILLIGRAM(S): at 04:12

## 2025-04-01 RX ADMIN — Medication 0.5 MILLIGRAM(S): at 00:18

## 2025-04-01 RX ADMIN — Medication 0.5 MILLIGRAM(S): at 06:37

## 2025-04-01 RX ADMIN — APIXABAN 5 MILLIGRAM(S): 2.5 TABLET, FILM COATED ORAL at 17:32

## 2025-04-01 RX ADMIN — IPRATROPIUM BROMIDE AND ALBUTEROL SULFATE 3 MILLILITER(S): .5; 2.5 SOLUTION RESPIRATORY (INHALATION) at 22:13

## 2025-04-01 RX ADMIN — OXYCODONE HYDROCHLORIDE 10 MILLIGRAM(S): 30 TABLET ORAL at 16:16

## 2025-04-01 RX ADMIN — Medication 1 TABLET(S): at 12:27

## 2025-04-01 RX ADMIN — LIDOCAINE HYDROCHLORIDE 1 PATCH: 20 JELLY TOPICAL at 12:27

## 2025-04-01 RX ADMIN — FUROSEMIDE 40 MILLIGRAM(S): 10 INJECTION INTRAMUSCULAR; INTRAVENOUS at 13:16

## 2025-04-01 RX ADMIN — OXYCODONE HYDROCHLORIDE 10 MILLIGRAM(S): 30 TABLET ORAL at 20:33

## 2025-04-01 RX ADMIN — AMLODIPINE BESYLATE 10 MILLIGRAM(S): 10 TABLET ORAL at 05:10

## 2025-04-01 RX ADMIN — APIXABAN 5 MILLIGRAM(S): 2.5 TABLET, FILM COATED ORAL at 05:11

## 2025-04-01 RX ADMIN — FUROSEMIDE 40 MILLIGRAM(S): 10 INJECTION INTRAMUSCULAR; INTRAVENOUS at 05:11

## 2025-04-01 RX ADMIN — Medication 250 MILLIGRAM(S): at 11:08

## 2025-04-01 RX ADMIN — Medication 25 GRAM(S): at 06:10

## 2025-04-01 RX ADMIN — Medication 650 MILLIGRAM(S): at 13:00

## 2025-04-01 RX ADMIN — MUPIROCIN CALCIUM 1 APPLICATION(S): 20 CREAM TOPICAL at 05:11

## 2025-04-01 RX ADMIN — SODIUM HYPOCHLORITE 1 APPLICATION(S): 0.12 SOLUTION TOPICAL at 05:12

## 2025-04-01 RX ADMIN — Medication 0.5 MILLIGRAM(S): at 12:58

## 2025-04-01 RX ADMIN — IPRATROPIUM BROMIDE AND ALBUTEROL SULFATE 3 MILLILITER(S): .5; 2.5 SOLUTION RESPIRATORY (INHALATION) at 09:11

## 2025-04-01 RX ADMIN — Medication 0.1 MILLIGRAM(S): at 13:17

## 2025-04-01 RX ADMIN — GABAPENTIN 600 MILLIGRAM(S): 400 CAPSULE ORAL at 22:56

## 2025-04-01 RX ADMIN — Medication 0.5 MILLIGRAM(S): at 18:30

## 2025-04-01 RX ADMIN — CLOTRIMAZOLE 1 APPLICATION(S): 1 CREAM TOPICAL at 17:31

## 2025-04-01 RX ADMIN — Medication 0.5 MILLIGRAM(S): at 18:00

## 2025-04-01 RX ADMIN — QUETIAPINE FUMARATE 25 MILLIGRAM(S): 25 TABLET ORAL at 22:57

## 2025-04-01 RX ADMIN — OXYCODONE HYDROCHLORIDE 10 MILLIGRAM(S): 30 TABLET ORAL at 05:31

## 2025-04-01 NOTE — PROGRESS NOTE ADULT - NS ATTEND AMEND GEN_ALL_CORE FT
Pt is a 39M with MHx obesity (Class III, BMI 69), pAfib (no AC), HTN, BEA (dz'ed 2019, AHI 34 non-compliant on CPAP set at 10), and history of b/l papilledema attributed to pseudotumor cerebri initially presenting as a transfer [facility] on 2/26 for acute hypoxemic respiratory failure s/p ETT intubation/MV with progression to refractory ARDS s/p initiation of vv-ECMO support (2/26-3/7) with failure to wean from ventilator s/p tracheostomy placement, further found to have RV failure s/p aggressive diuresis and PNA followed by severe sepsis 2/2 panniculitis c/b candida albicans fungemia now transferred to [facility] for further medical management.     Pt now awake and alert, spontaneously communicative and at mental status baseline. Continues to have severe functional debility likely 2/2 prolonged critical illness polyneuropathy but is progressing well. Now able to transfer via [place] to chair daily and is independent of UE and fine motor function. Off all sedation. Weaning of catapres initiated 2/25 (decreased to 0.2mg TID.) Weaning pain control, pt remains dependent on Dilaudid prn for LE neuropathic pain. PO regimen added and gabapentin increased without benefit, increased again today. Pain mgmt team consulted, recs appreciated. Podiatry consulted, possible worsening of right 5th digit that may require podiatric sx. Will CTM for now. No evidence of active infectious process in feet.    Pt with acute combined hypoxemic and hypercapnic respiratory failure with failure to wean from ventilator s/p tracheostomy placement. Pt was tolerating TC QD, PSV (18/10) qhs. Airway clearance therapy in place. Wean O2 supplementation for goal O2 saturation 90-95%. Aspiration precautions and oral hygiene in place. Decannulated at bedside 3/28, continues to use Bilevel NIV qhs.     Pt further presenting with acute on chronic hypercapnia and will require Bilevel after discharge for chronic use 2/2 chronic hypercapnic respiratory failure from hypoventilation and obesity hypoventilation syndrome (BMI 69.) Pt has exhibited an inability to maintain appropriate ventilation with persistent hypercapnia without Bilevel support qhs. Pt has high CO2 levels (PaCO2 on ABG >52.) PaCO2 done immediately upon awakening while breathing his prescribed FiO2 shows the patient's PaCO2 worsened >7mmHg compared to initial ABG. Patient's OHS requires ventilatory support. Due to the patient's severe disease state and severe dyspnea, Bilevel is necessary. Due to the patient's decreased ventilatory drive and high CO2 levels, without Bilevel support patient will experience rapid clinical deterioration, which will lead to serious medical harm. Acute on chronic hypercapnic respiratory failure due to patient (BMI 69) obesity, which is causing restriction of the thoracic cage not allowing for proper gas exchange.     Pt initially p/w RV failure 2/2 pulmHTN now s/p aggressive diuresis with improvement. Now transitioned to PO diuretics with goal to maintain euvolemia. Acetazolamide added today. AFib well controlled conservatively, will continue to monitor on telemetry. TTE 2/25 with new RVE, 3/11 with RVSD. DVT noted on repeat duplex of the right IJ and bilateral peroneal veins. Will c/w full A/C on Eliquis for DVT and AFib.     Pt with oropharyngeal dysphagia s/p PEG placement (3/7) now transitioned to PO diet (3/20) via FEEST. Will continue to monitor on regular PO diet. Bowel regimen in place prn. PPI ppx. Transaminitis improving.     Pt further found to have severe sepsis 2/ candida albicans fungemia possibly from panniculitis (3/15, cleared 3/18) now on caspofungin with plan to complete 14 day course from clearance. TTE (-) endocarditis. Wound care team following. ID recs appreciated. Further found to have worsening sacral ulceration with induration, CT A/P and MRI spine performed without evidence of underlying tract or drainable fluid collection. Now with acute onset fevers, although without new s/s or complaints and with unclear source. Cx resent, empiric abx initiated with improvement. Will CTM closely and f/u repeat cx.     Dispo pending medical optimization. Pt full code. DVT ppx in place. Palliative consulted, recs appreciated. Will continue to update family and discuss plan of care throughout hospitalization. Dispo planning to acute rehab initiated.

## 2025-04-01 NOTE — PROGRESS NOTE ADULT - SUBJECTIVE AND OBJECTIVE BOX
Infectious Diseases Follow Up:    Patient is a 39y old  Male who presents with a chief complaint of sob (02 Mar 2025 06:19)      Interval History/ROS:  Febrile to 101.4, WBC 14  pt w/ b/l foot pain     Allergies  No Known Allergies        ANTIMICROBIALS:  caspofungin IVPB 70 every 24 hours  piperacillin/tazobactam IVPB.. 3.375 every 8 hours  vancomycin  IVPB    vancomycin  IVPB 2000 every 12 hours      Current Abx:     Previous Abx     OTHER MEDS:  MEDICATIONS  (STANDING):  acetaminophen     Tablet .. 650 every 6 hours PRN  albuterol/ipratropium for Nebulization 3 every 6 hours  amLODIPine   Tablet 10 daily  apixaban 5 every 12 hours  cloNIDine 0.1 every 8 hours  dextrose 50% Injectable 25 once  dextrose 50% Injectable 12.5 once  dextrose 50% Injectable 25 once  dextrose Oral Gel 15 once PRN  furosemide    Tablet 40 two times a day  gabapentin 600 every 8 hours  glucagon  Injectable 1 once  HYDROmorphone  Injectable 0.5 every 6 hours PRN  insulin lispro (ADMELOG) corrective regimen sliding scale  three times a day before meals  insulin lispro (ADMELOG) corrective regimen sliding scale  at bedtime  melatonin 3 at bedtime  oxyCODONE    IR 10 every 4 hours PRN  pantoprazole    Tablet 40 before breakfast  polyethylene glycol 3350 17 daily  QUEtiapine 25 at bedtime  senna 2 at bedtime      Vital Signs Last 24 Hrs  T(C): 37.3 (01 Apr 2025 08:00), Max: 38.6 (31 Mar 2025 20:00)  T(F): 99.2 (01 Apr 2025 08:00), Max: 101.4 (31 Mar 2025 20:00)  HR: 120 (01 Apr 2025 09:16) (113 - 126)  BP: 144/87 (01 Apr 2025 08:00) (117/74 - 144/87)  BP(mean): --  RR: 20 (01 Apr 2025 08:00) (16 - 20)  SpO2: 100% (01 Apr 2025 09:16) (97% - 100%)    Parameters below as of 01 Apr 2025 09:16  Patient On (Oxygen Delivery Method): nasal cannula        PHYSICAL EXAM:  GENERAL: NAD  HEAD:  Atraumatic, Normocephalic  EYES: EOMI, conjunctiva and sclera clear  CHEST/LUNG: On NC, CTAB  HEART: RRR  ABDOMEN: Soft, Nontender, Nondistended;  Extremities: B/l feet wrapped   PSYCH: AAOx3                                     8.1    14.48 )-----------( 369      ( 01 Apr 2025 07:33 )             24.3       04-01    133[L]  |  92[L]  |  5[L]  ----------------------------<  96  3.6   |  31  |  0.82    Ca    8.6      01 Apr 2025 07:33  Phos  5.3     04-01  Mg     1.70     04-01    TPro  7.5  /  Alb  2.7[L]  /  TBili  0.7  /  DBili  x   /  AST  15  /  ALT  13  /  AlkPhos  57  03-30      Urinalysis Basic - ( 01 Apr 2025 07:33 )    Color: x / Appearance: x / SG: x / pH: x  Gluc: 96 mg/dL / Ketone: x  / Bili: x / Urobili: x   Blood: x / Protein: x / Nitrite: x   Leuk Esterase: x / RBC: x / WBC x   Sq Epi: x / Non Sq Epi: x / Bacteria: x        MICROBIOLOGY:  Vancomycin Level, Random: <4.0 ug/mL (04-01-25 @ 07:33)  v  Blood Blood-Peripheral  03-30-25   No growth at 24 hours  --  --      Trach Asp Tracheal Aspirate  03-21-25   Commensal kyle consistent with body site  --    Few polymorphonuclear leukocytes per low power field  No Squamous epithelial cells per low power field  No organisms seen per oil power field      Blood Blood-Peripheral  03-20-25   No growth at 5 days  --  --      Blood Blood-Venous  03-20-25   No growth at 5 days  --  --      Blood Blood-Peripheral  03-18-25   No growth at 5 days  --  Blood Culture PCR  Candida albicans      Nares/Axilla/Groin Nares/Axilla/Groin  03-17-25   Culture NEGATIVE for Candida auris.  This surveillance culture is intended for Infection Control purposes only.  A negative result does not preclude the carriage of other fungal  organisms.  --  --      Blood Blood-Venous  03-16-25   Growth in aerobic bottle: Candida albicans  See previous culture [id number]  --    Growth in aerobic bottle: Yeast like cells      Blood Blood-Peripheral  03-16-25   Growth in aerobic bottle: Candida albicans  See previous culture [id number]  --    Growth in aerobic bottle: Yeast like cells      Trach Asp Tracheal Aspirate  03-16-25   Commensal kyle consistent with body site  --    Few polymorphonuclear leukocytes per low power field  Rare Squamous epithelial cells per low power field  Rare Gram Positive Rods seen per oil power field  Rare Gram Negative Rods seen per oil power field      Blood Blood-Venous  03-15-25   Growth in aerobic bottle: Candida albicans  See previous culture [id number]  --    Growth in aerobic bottle: Yeast like cells      Blood Blood-Peripheral  03-15-25   Growth in aerobic bottle: Candida albicans  Direct identification is available within approximately 3-5  hours either by Blood Panel Multiplexed PCR or Direct  MALDI-TOF. Details: [email or web address]  --  Blood Culture PCR  Candida albicans      Catheterized Catheterized  03-12-25   <10,000 CFU/mL Normal Urogenital Kyle  --  --      Blood Blood-Peripheral  03-11-25   No growth at 5 days  --  --      Blood Blood-Peripheral  03-11-25   No growth at 5 days  --  --      Trach Asp Tracheal Aspirate  03-10-25   Commensal kyle consistent with body site  --    Few polymorphonuclear leukocytes per low power field  No Squamous epithelial cells per low power field  No organisms seen per oil power field      Bronchial Bronchial Lavage  03-05-25   No growth  --    Rare polymorphonuclear leukocytes per low power field  No squamous epithelial cells per low power field  No organisms seen per oil power field                RADIOLOGY:

## 2025-04-01 NOTE — PROGRESS NOTE ADULT - ASSESSMENT
38 YO M with PMHx of morbid obesity, BEA on CPAP, pAFIB (not on AC), HTN, and BL papilledema attributed to pseudotumor cerebri who presented initially to [facility] on 2/24 with SOB and BL LE edema. Found with URI outpatient with progressive SOB, and concern for noted for MFPNA on imaging. Course progressed with AHRF with worsening O2 demand, respiratory distress, secretions, and somnolence requiring intubation (difficult with success after 6 attempts) in ED and admission to [facility] MICU. While in MICU, patient noted with increasing O2 demand with no improvement despite optimal vent management. Concern noted for progressive ARDS, unable to prone given morbid obesity, and ultimately cannulated for vvECMO on 2/25 and transferred to [facility] for further management on 2/26. While at [facility], tx with diuresis and ABX, and ultimately decannulated and s/p 60XLTCP trach and PEG on 3/7. Patient ultimately transferred to [facility] on 3/11 and course complicated by recurrent high grade fevers and found with fungemia.    NEUROLOGY  # Hx of Pseudotumor Cerebri   - s/p LP in 2024 with high opening pressure  - s/p MRI 2024 with possible pituitary mass but unclear because of pressure effect from pseudotumor cerebri causing partially empty sella.  - Prolactin elevated   - ACTH low but recieved steroids during admission   - FT4 low normal and TSH normal, but given FT4 low normal TSH should be higher   - Discuss endocrine consult for inhouse work up vs outpatient.     # Sedation   - Weaned off sedation in ICU   - Nimbex turned off 2/27   - Prop turned off 3/9   - Precedex turned off and catapres started 3/11  - Continue on catapres 0.2 TID (decreased 3/25) - taper    # Insomnia   - Patient worked night shift x 15 years   - Trouble sleeping at night leading to day time sleepiness and poor participation with PT  - Continue on Seroquel 25 QHS (increased 3/18)   - Continue on melatonin   - Continue on neurontin as below    # BL LE neuropathy   - CIPN concern   - Neurontin increased to 300mg Q8H with relief   - added Percocet Q4H for moderate pain (3/25) and continue on Dilaudid Q6H for severe pain   - Pain mgt called to consult - with recs for tylenol ATC, NEurontin increased to 400mg q6, Oxycodone 5 q4h prn , Dilaudid for BREAKTHROUGH pain only, Lidocaine patches on each leg, ice prn to feet  - Some improvement in pain - PT consult for TENS,   - osycodone inc to 10mg as pt with no relief /buttocks area indurated /tender CT ordered     CARDIOVASCULAR  # HTN   - HTN noted in ICU requiring cardene and esmolol GTTs   - Lisinopril dc'ed to increase catapres   - Continue on norvasc 10 and catapres 0.3 TID   - Monitor BP     # AFIB   - Hx of pAFIB   - No RVR episodes or pAFIB episodes in ICU   - No rate control medications needed  - Monitor on telemetry     # RV dilation second to PHTN   - POCUS on arrival to [facility] with RVE concern   - TTE 10/2024 with EF 55-60 with normal LVSF, moderate LVH and normal RVSF and size with no concern for pHTN   - TTE on 2/25 at  with EF 61 and normal LVSF, but enlarged RV size with reduced RVSF, mild TR, mild PHTN with PASP 49, and dilated IVC to 3.4cn, but normal TAPSE 2.1.  - Continue on aggressive diuresis in ICU    - TTE 3/11 with RVSF reduced with TAPSE 1.6. IVC improved to 2.12cm.   - Continue on lasix 40 PO BID   - Monitor IO    RESPIRATORY  # ARDS second to MFPNA   - Hx of BEA on CPAP presented to  post URI with SOB and found with AHRF with progression to ARDS second to post viral MFPNA   - Intubated 2/25   - Cannulated for vvECMO 2/26   - s/p 60XLTCP tracheostomy 3/7   - Decannulated 3/7   - CTSx removed tracheostomy and ECMO sutures on 3/17   - Continue on TC QD with PMV as able with strong phonation  - Continue on PS 18/10/40 QHS given OHS   - Continue on nebs and HTS   -  trials continue and now trialing overnight with BIPAP for OHS (10/8/12)  - trend VBG QD  - Decannulated 3/28     GI  # Dysphagia   - s/p PEG 3/7   - Attempted green dye on 3/13 and failed  - Continue on TF  - Continue on miralax and senna  - RPT green dye passed 3/19  - 3/20 Passed FEEST - on soft bite sized with thin liquids     # Mild transaminitis   - LFTs elevated in ICU with TBILI elevated likely from ECMO hemolysis   - US ABD with hepatic steatosis   - Trend LFTs      RENAL  # ELLA   - ELLA likely from cardiorenal with RVE   - Diuresed in ICU and improved   - Monitor renal function and UOP     # Hypernatremia   - Hypernatremia with fever spikes   - Fever curve improved and attempting to wean FWF   - Continue on  Q6H (decreased 3/19)    INFECTIOUS DISEASE  # Recurrent fevers with fungemia from unknown source   - Recurrent fevers post ECMO decannulation thought initially to be cytokine surge   - BCx, SCx, UCx, RVP and MRSA RPT on 3/12 negative   - Completed zosyn (3/12-3/18) empirically with improved WBC , however recurrent fevers noted.   - RPT SCx, UA, CXR and RVP negative  - TTE 3/17 with no IE  - PanCT 3/17 with PNA and persistent panniculitis   - BCx 3/15 and 3/16 with candida albicans.   - Concern for possible source from panniculitis   - Continue on caspo (3/17 - ) and RPT BCx 3/18 negative  - ID following  - plan for 2 week course after negative BCx to 4/2  - 3/20 Bcx- NGTD    # MFPNA and abdominal pannus cellulitis   - Presented to  post URI with SOB and found with AHRF with progression to ARDS second to post viral MFPNA   - RVP, legionella, strept, BAL, BCx, UCx, HIV, PCP, and adenovirus PCR negative   - Initially started on multiple agents in ICU and ultimatelty completed zosyn (2/26-3/9) ZMAX (2/25-2/26) and vanco (2/26-3/1)     # R/o Sacral OM  - Noted with hard indurated sacral wound by Wound Care Team  - CT Pelvis (3/29) revaeling superficial skin thickening overlying the sacrum with underlying edema of the subcutaneous fat, in keeping with the clinical history of sacral wound. No evidence of underlying tract or drainable fluid collection.    # Penile discharge   - GC/ chlamydia negative   - HIV negative   - Monitor for now    # PPX   - MRSA PCR positive and completed bactroban (2/26-3/3)     HEME   # Anemia likely second to ECMO circuit vs AOCD   - HH low in ICU second to ECMO circuit   - HH dropping again today however no bleeding noted   - Microcytic and hemochromic, sent anemia panel 3/19  - Trend HH     VASCULAR   # R IJ and BL LE DVTs   - Post ECMO dopplers on 3/9 negative for BL UE/ LE DVTs.   - Subsequent post ECMO dopplers performed on 3/14 and noted with acute, non-occlusive deep vein thrombosis noted within the right internal jugular vein. acute, occlusive deep vein thromboses noted within the right and left peroneal veins at both mid calves, and age-indeterminate, occlusive deep vein thrombosis visualized within the left gastrocnemius vein at the proximal calf.     - Continue on argatroban GTT and change to eliquis     PODIATRY   # BL plantar feet blisters likely pressors induced  - Seen by podiatry and blisters lanced on 3/4 and again on 3/18  - Continue on local wound care per podiatry   - Podiatry following  - OK for WBAT will fu with PT for offloading shoe if appropriate  - Pain mgt consult   - Podiatry FU 3/28 done     ENDOCRINE  # Pre-DM2   - A1C 6.7  - Continue on ISS   - Monitor FS   - IF no demand then stop ISS     LINES/ TUBES  - R IJ and R FEM ECMO (2/26-3/7)     SKIN  # Pannus canndial intertrigo   # Sacrum/ buttock MAD  - Continue on clotrimazole cream   - WOC appreciated   -Seen by WC pt noted to have oozing from Buttock wound surgicel placed by wound care On follow up no further bleeding  - Wound care placed orders   - No bleeding     ETHICS/ GOC    - FULL CODE     DISPO - PT following  Seen by PMR for acute rehab per recs      38 YO M with PMHx of morbid obesity, BEA on CPAP, pAFIB (not on AC), HTN, and BL papilledema attributed to pseudotumor cerebri who presented initially to [facility] on 2/24 with SOB and BL LE edema. Found with URI outpatient with progressive SOB, and concern for noted for MFPNA on imaging. Course progressed with AHRF with worsening O2 demand, respiratory distress, secretions, and somnolence requiring intubation (difficult with success after 6 attempts) in ED and admission to [facility] MICU. While in MICU, patient noted with increasing O2 demand with no improvement despite optimal vent management. Concern noted for progressive ARDS, unable to prone given morbid obesity, and ultimately cannulated for vvECMO on 2/25 and transferred to [facility] for further management on 2/26. While at [facility], tx with diuresis and ABX, and ultimately decannulated and s/p 60XLTCP trach and PEG on 3/7. Patient ultimately transferred to [facility] on 3/11 and course complicated by recurrent high grade fevers and found with fungemia.    NEUROLOGY  # Hx of Pseudotumor Cerebri   - s/p LP in 2024 with high opening pressure  - s/p MRI 2024 with possible pituitary mass but unclear because of pressure effect from pseudotumor cerebri causing partially empty sella.  - Prolactin elevated   - ACTH low but recieved steroids during admission   - FT4 low normal and TSH normal, but given FT4 low normal TSH should be higher   - Discuss endocrine consult for inhouse work up vs outpatient.     # Sedation   - Weaned off sedation in ICU   - Nimbex turned off 2/27   - Prop turned off 3/9   - Precedex turned off and catapres started 3/11  - Continue on catapres 0.2 TID (decreased 3/25) - taper done 3/31 to 0.1 tid    # Insomnia   - Patient worked night shift x 15 years   - Trouble sleeping at night leading to day time sleepiness and poor participation with PT  - Continue on Seroquel 25 QHS (increased 3/18)   - Continue on melatonin   - Continue on neurontin as below    # BL LE neuropathy   - CIPN concern   - Neurontin increased to 300mg Q8H with relief   - added Percocet Q4H for moderate pain (3/25) and continue on Dilaudid Q6H for severe pain   - Pain mgt called to consult - with recs for tylenol ATC, NEurontin increased to 400mg q6, Oxycodone 5 q4h prn , Dilaudid for BREAKTHROUGH pain only, Lidocaine patches on each leg, ice prn to feet  - Some improvement in pain - PT consult for TENS,   - oxycodone inc to 10mg as pt with no relief /buttocks area indurated /tender CT ordered   - 4/1 d/w pain mgt increase gabapentin to 600 TID -done     CARDIOVASCULAR  # HTN   - HTN noted in ICU requiring cardene and esmolol GTTs   - Lisinopril dc'ed to increase catapres   - Continue on norvasc 10 and catapres 0.3 TID   - Monitor BP     # AFIB   - Hx of pAFIB   - No RVR episodes or pAFIB episodes in ICU   - No rate control medications needed  - Monitor on telemetry     # RV dilation second to PHTN   - POCUS on arrival to [facility] with RVE concern   - TTE 10/2024 with EF 55-60 with normal LVSF, moderate LVH and normal RVSF and size with no concern for pHTN   - TTE on 2/25 at  with EF 61 and normal LVSF, but enlarged RV size with reduced RVSF, mild TR, mild PHTN with PASP 49, and dilated IVC to 3.4cn, but normal TAPSE 2.1.  - Continue on aggressive diuresis in ICU    - TTE 3/11 with RVSF reduced with TAPSE 1.6. IVC improved to 2.12cm.   - Continue on lasix 40 PO BID   - Monitor IO/BMP    RESPIRATORY  # ARDS second to MFPNA   - Hx of BEA on CPAP presented to  post URI with SOB and found with AHRF with progression to ARDS second to post viral MFPNA   - Intubated 2/25   - Cannulated for vvECMO 2/26   - s/p 60XLTCP tracheostomy 3/7   - Decannulated 3/7   - CTSx removed tracheostomy and ECMO sutures on 3/17   - Continue on TC QD with PMV as able with strong phonation  - Continue on PS 18/10/40 QHS given OHS   - Continue on nebs and HTS   -  trials continue and now trialing overnight with BIPAP for OHS (10/8/12)  - trend VBG QD  - Decannulated 3/28   - Using nasal cannula 4L     GI  # Dysphagia   - s/p PEG 3/7   - Attempted green dye on 3/13 and failed  - Continue on TF  - Continue on miralax and senna  - RPT green dye passed 3/19  - 3/20 Passed FEEST - on soft bite sized with thin liquids     # Mild transaminitis   - LFTs elevated in ICU with TBILI elevated likely from ECMO hemolysis   - US ABD with hepatic steatosis   - Trend LFTs      RENAL  # ELLA   - ELLA likely from cardiorenal with RVE   - Diuresed in ICU and improved   - Monitor renal function and UOP     # Hypernatremia   - Hypernatremia with fever spikes   - Fever curve improved and attempting to wean FWF   - Continue on  Q6H (decreased 3/19)    INFECTIOUS DISEASE  # Recurrent fevers with fungemia from unknown source   - Recurrent fevers post ECMO decannulation thought initially to be cytokine surge   - BCx, SCx, UCx, RVP and MRSA RPT on 3/12 negative   - Completed zosyn (3/12-3/18) empirically with improved WBC , however recurrent fevers noted.   - RPT SCx, UA, CXR and RVP negative  - TTE 3/17 with no IE  - PanCT 3/17 with PNA and persistent panniculitis   - BCx 3/15 and 3/16 with candida albicans.   - Concern for possible source from panniculitis   - Continue on caspo (3/17 - ) and RPT BCx 3/18 negative  - ID following  - plan for 2 week course after negative BCx to 4/2  - 3/20 Bcx- NGTD  - 3/30 spiked temp cx's sent + ua and c/s pending Bcx neg to date - started on vanco /zosyn ID following       # MFPNA and abdominal pannus cellulitis   - Presented to  post URI with SOB and found with AHRF with progression to ARDS second to post viral MFPNA   - RVP, legionella, strept, BAL, BCx, UCx, HIV, PCP, and adenovirus PCR negative   - Initially started on multiple agents in ICU and ultimatelty completed zosyn (2/26-3/9) ZMAX (2/25-2/26) and vanco (2/26-3/1)     # R/o Sacral OM  - Noted with hard indurated sacral wound by Wound Care Team  - CT Pelvis (3/29) revaeling superficial skin thickening overlying the sacrum with underlying edema of the subcutaneous fat, in keeping with the clinical history of sacral wound. No evidence of underlying tract or drainable fluid collection.    # Penile discharge   - GC/ chlamydia negative   - HIV negative   - Monitor for now    # PPX   - MRSA PCR positive and completed bactroban (2/26-3/3)     HEME   # Anemia likely second to ECMO circuit vs AOCD   - HH low in ICU second to ECMO circuit   - HH dropping again today however no bleeding noted   - Microcytic and hemochromic, sent anemia panel 3/19  - Trend HH     VASCULAR   # R IJ and BL LE DVTs   - Post ECMO dopplers on 3/9 negative for BL UE/ LE DVTs.   - Subsequent post ECMO dopplers performed on 3/14 and noted with acute, non-occlusive deep vein thrombosis noted within the right internal jugular vein. acute, occlusive deep vein thromboses noted within the right and left peroneal veins at both mid calves, and age-indeterminate, occlusive deep vein thrombosis visualized within the left gastrocnemius vein at the proximal calf.     - Continue on argatroban GTT and change to eliquis     PODIATRY   # BL plantar feet blisters likely pressors induced  - Seen by podiatry and blisters lanced on 3/4 and again on 3/18  - Continue on local wound care per podiatry   - Podiatry following  - OK for WBAT will fu with PT for offloading shoe if appropriate  - Pain mgt consult   - Podiatry FU 3/28 done /following     ENDOCRINE  # Pre-DM2   - A1C 6.7  - Continue on ISS   - Monitor FS   - IF no demand then stop ISS     LINES/ TUBES  - R IJ and R FEM ECMO (2/26-3/7)     SKIN  # Pannus canndial intertrigo   # Sacrum/ buttock MAD  - Continue on clotrimazole cream   - WOC appreciated   -Seen by WC pt noted to have oozing from Buttock wound surgicel placed by wound care On follow up no further bleeding  - Wound care placed orders   - No bleeding     ETHICS/ GOC    - FULL CODE     DISPO - PT following  Seen by PMR for acute rehab per recs

## 2025-04-01 NOTE — PROGRESS NOTE ADULT - ASSESSMENT
This is a 38 y/o M w/ PMHx AF (not on AC), HTN, BEA, pseudotumor cerebri s/p LP in 2024, initially presented to [place] on 2/24, SOB, LE edema with URI symptoms and sick contacts, noted to have severe ARDS, intubated however still hypoxia, cannulated for VV ECMO on 2/25, transferred to [facility] on 2/25, started on empiric Vanco, Zosyn for multifocal PNA, abdominal wall cellulitis, s/p trach placement by CT surgery on 3/7, ECMO decannulated on 3/7. Zosyn held on 3/9.  C/b fevers on 3/10, restarted on Zosyn, transferred to [facility] on 3/13, Bcx now w/ yeast.    #Candida albicans fungemia   #Fevers   #Multifocal PNA, abdominal wall cellulitis s/p Vanco/Zosyn  #ARDS, hypoxic respiratory failure requiring VV EMCO 2/2 multifocal PNA? s/p decannulation on 3/7    Overall, 38 y/o M w/ PMHx AF (not on AC), HTN, BEA, pseudotumor cerebri s/p LP in 2024 admitted to [facility] on 2/26 for ARDS, hypoxic respiratory failure requiring VV EMCO 2/2 multifocal PNA? s/p decannulation on 3/7, c/b abdominal wall cellulitis s/p Vancomycin/Zosyn, s/p trach on 3/7, in the setting of persistent fevers despite Zosyn, BCx now w/ yeast, Candida albicans   Unclear source for yeast in BCx, pt had all central lines removed on 3/7, not getting TPN, no abdominal pain on exam, PEG site well appearing.     CT A/P w/o clear abdominal source of fungemia, possibly 2/2 abdominal wall cellulitis? TTE w/o IE.  BCx 3/18 1/2 positive, 3/20 NGTD x 2.     Pt with worsening sepsis on 3/30, febrile to 103, WBC 16.   D/w wound care, sacral wound indurated, but no fluctuance does not appear infected.   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   Unclear source at this time    Recommendations;   1. C/w Vancomycin decrease dose to 1.25 g q8, Zosyn 3.375 g 8 for now,   2. Caspofungin 70 mg daily for now, end date 4/2, however given sepsis can c/w for now  3. U/A without pyuria, F/u BCx NGTD    Thank you for consulting us and involving us in the management of this patient's case. In addition to reviewing history, imaging, documents, labs, microbiology, and infection control strategies and potential issues.     ID will continue to follow    Shailesh Owens M.D.  Attending Physician  Division of Infectious Diseases  Department of Medicine    Please contact through [[phone]].  Office: [phone] (after 5 PM or weekend)

## 2025-04-01 NOTE — PROGRESS NOTE ADULT - SUBJECTIVE AND OBJECTIVE BOX
CHIEF COMPLAINT: Patient is a 39y old  Male who presents with a chief complaint of sob (02 Mar 2025 06:19)      INTERVAL EVENTS: Continues with LE pain     ROS: Seen by bedside during AM rounds     OBJECTIVE:  ICU Vital Signs Last 24 Hrs  T(C): 37.4 (2025 07:00), Max: 38.6 (31 Mar 2025 20:00)  T(F): 99.4 (2025 07:00), Max: 101.4 (31 Mar 2025 20:00)  HR: 119 (2025 03:23) (111 - 121)  BP: 136/76 (2025 02:00) (117/74 - 136/76)  BP(mean): 85 (31 Mar 2025 08:00) (85 - 85)  ABP: --  ABP(mean): --  RR: 16 (2025 02:00) (16 - 22)  SpO2: 100% (2025 03:23) (98% - 100%)    O2 Parameters below as of 2025 03:23  Patient On (Oxygen Delivery Method): BiPAP/CPAP               @ 07:01  -  04- @ 07:00  --------------------------------------------------------  IN: 200 mL / OUT: 1200 mL / NET: -1000 mL      CAPILLARY BLOOD GLUCOSE      POCT Blood Glucose.: 105 mg/dL (31 Mar 2025 21:09)      PHYSICAL EXAM:  General: NAD   Neck: trachea midline.  Cards: S1S2 RR  no murmurs   Pulm: Clear bilaterally. No wheezes.   Abdomen: Morbidly obese abdomen. Soft, NTND. (+) PEG noted, site c/d/i.   Extremities: Mild b/l LE edema. Extremities warm to touch.   Neurology: AOx3. 5/5 motor strength b/l UE. 4/5 motor strength LLE. Unable to range RLE d/t weakness.   Skin: warm to touch, color appropriate for ethnicity. Refer to RN assessment for further details.      HOSPITAL MEDICATIONS:  MEDICATIONS  (STANDING):  albuterol/ipratropium for Nebulization 3 milliLiter(s) Nebulizer every 6 hours  amLODIPine   Tablet 10 milliGRAM(s) Oral daily  apixaban 5 milliGRAM(s) Oral every 12 hours  caspofungin IVPB 70 milliGRAM(s) IV Intermittent every 24 hours  chlorhexidine 2% Cloths 1 Application(s) Topical <User Schedule>  cloNIDine 0.1 milliGRAM(s) Oral every 8 hours  clotrimazole 1% Cream 1 Application(s) Topical two times a day  Dakins Solution - 1/4 Strength 1 Application(s) Topical two times a day  dextrose 50% Injectable 25 Gram(s) IV Push once  dextrose 50% Injectable 12.5 Gram(s) IV Push once  dextrose 50% Injectable 25 Gram(s) IV Push once  furosemide    Tablet 40 milliGRAM(s) Oral two times a day  gabapentin 400 milliGRAM(s) Oral every 8 hours  glucagon  Injectable 1 milliGRAM(s) IntraMuscular once  HYDROmorphone  Injectable 0.5 milliGRAM(s) IV Push once  insulin lispro (ADMELOG) corrective regimen sliding scale   SubCutaneous three times a day before meals  insulin lispro (ADMELOG) corrective regimen sliding scale   SubCutaneous at bedtime  lidocaine   4% Patch 1 Patch Transdermal daily  lidocaine   4% Patch 1 Patch Transdermal daily  melatonin 3 milliGRAM(s) Oral at bedtime  multivitamin 1 Tablet(s) Oral daily  mupirocin 2% Ointment 1 Application(s) Topical two times a day  pantoprazole    Tablet 40 milliGRAM(s) Oral before breakfast  piperacillin/tazobactam IVPB.. 3.375 Gram(s) IV Intermittent every 8 hours  polyethylene glycol 3350 17 Gram(s) Oral daily  QUEtiapine 25 milliGRAM(s) Oral at bedtime  senna 2 Tablet(s) Oral at bedtime    MEDICATIONS  (PRN):  acetaminophen     Tablet .. 650 milliGRAM(s) Oral every 6 hours PRN Temp greater or equal to 38C (100.4F), Mild Pain (1 - 3), Moderate Pain (4 - 6)  dextrose Oral Gel 15 Gram(s) Oral once PRN Blood Glucose LESS THAN 70 milliGRAM(s)/deciliter  HYDROmorphone  Injectable 0.5 milliGRAM(s) IV Push every 6 hours PRN MOd-Severe Pain (4 - 10)  oxyCODONE    IR 10 milliGRAM(s) Oral every 4 hours PRN MOD/SEVERE PAIN 4-10      LABS:                        8.8    16.53 )-----------( 402      ( 30 Mar 2025 23:52 )             29.0     03-30    131[L]  |  88[L]  |  7   ----------------------------<  108[H]  4.0   |  29  |  0.85    Ca    8.9      30 Mar 2025 23:52  Phos  4.7       Mg     1.40         TPro  7.5  /  Alb  2.7[L]  /  TBili  0.7  /  DBili  x   /  AST  15  /  ALT  13  /  AlkPhos  57        Urinalysis Basic - ( 31 Mar 2025 08:08 )    Color: Yellow / Appearance: Cloudy / S.010 / pH: x  Gluc: x / Ketone: 15 mg/dL  / Bili: Negative / Urobili: 1.0 mg/dL   Blood: x / Protein: 30 mg/dL / Nitrite: Negative   Leuk Esterase: Small / RBC: 1 /HPF / WBC 5 /HPF   Sq Epi: x / Non Sq Epi: 2 /HPF / Bacteria: Many /HPF        Venous Blood Gas:   @ 23:52  --/--/--/--/--  VBG Lactate: 1.3

## 2025-04-02 LAB
ANION GAP SERPL CALC-SCNC: 15 MMOL/L — HIGH (ref 7–14)
BASOPHILS # BLD AUTO: 0.03 K/UL — SIGNIFICANT CHANGE UP (ref 0–0.2)
BASOPHILS NFR BLD AUTO: 0.2 % — SIGNIFICANT CHANGE UP (ref 0–2)
BUN SERPL-MCNC: 6 MG/DL — LOW (ref 7–23)
CALCIUM SERPL-MCNC: 8.8 MG/DL — SIGNIFICANT CHANGE UP (ref 8.4–10.5)
CHLORIDE SERPL-SCNC: 90 MMOL/L — LOW (ref 98–107)
CO2 SERPL-SCNC: 29 MMOL/L — SIGNIFICANT CHANGE UP (ref 22–31)
CREAT SERPL-MCNC: 0.83 MG/DL — SIGNIFICANT CHANGE UP (ref 0.5–1.3)
CULTURE RESULTS: SIGNIFICANT CHANGE UP
CULTURE RESULTS: SIGNIFICANT CHANGE UP
EGFR: 116 ML/MIN/1.73M2 — SIGNIFICANT CHANGE UP
EGFR: 116 ML/MIN/1.73M2 — SIGNIFICANT CHANGE UP
EOSINOPHIL # BLD AUTO: 0.06 K/UL — SIGNIFICANT CHANGE UP (ref 0–0.5)
EOSINOPHIL NFR BLD AUTO: 0.4 % — SIGNIFICANT CHANGE UP (ref 0–6)
GLUCOSE BLDC GLUCOMTR-MCNC: 117 MG/DL — HIGH (ref 70–99)
GLUCOSE BLDC GLUCOMTR-MCNC: 118 MG/DL — HIGH (ref 70–99)
GLUCOSE BLDC GLUCOMTR-MCNC: 127 MG/DL — HIGH (ref 70–99)
GLUCOSE BLDC GLUCOMTR-MCNC: 128 MG/DL — HIGH (ref 70–99)
GLUCOSE SERPL-MCNC: 99 MG/DL — SIGNIFICANT CHANGE UP (ref 70–99)
HCT VFR BLD CALC: 22.1 % — LOW (ref 39–50)
HGB BLD-MCNC: 8 G/DL — LOW (ref 13–17)
IANC: 10.59 K/UL — HIGH (ref 1.8–7.4)
IMM GRANULOCYTES NFR BLD AUTO: 0.8 % — SIGNIFICANT CHANGE UP (ref 0–0.9)
LYMPHOCYTES # BLD AUTO: 1.52 K/UL — SIGNIFICANT CHANGE UP (ref 1–3.3)
LYMPHOCYTES # BLD AUTO: 11 % — LOW (ref 13–44)
MAGNESIUM SERPL-MCNC: 1.4 MG/DL — LOW (ref 1.6–2.6)
MCHC RBC-ENTMCNC: 32.5 PG — SIGNIFICANT CHANGE UP (ref 27–34)
MCHC RBC-ENTMCNC: 36.2 G/DL — HIGH (ref 32–36)
MCV RBC AUTO: 89.8 FL — SIGNIFICANT CHANGE UP (ref 80–100)
MONOCYTES # BLD AUTO: 1.48 K/UL — HIGH (ref 0–0.9)
MONOCYTES NFR BLD AUTO: 10.7 % — SIGNIFICANT CHANGE UP (ref 2–14)
NEUTROPHILS # BLD AUTO: 10.59 K/UL — HIGH (ref 1.8–7.4)
NEUTROPHILS NFR BLD AUTO: 76.9 % — SIGNIFICANT CHANGE UP (ref 43–77)
NRBC # BLD AUTO: 0 K/UL — SIGNIFICANT CHANGE UP (ref 0–0)
NRBC # FLD: 0 K/UL — SIGNIFICANT CHANGE UP (ref 0–0)
NRBC BLD AUTO-RTO: 0 /100 WBCS — SIGNIFICANT CHANGE UP (ref 0–0)
PHOSPHATE SERPL-MCNC: 4.8 MG/DL — HIGH (ref 2.5–4.5)
PLATELET # BLD AUTO: 371 K/UL — SIGNIFICANT CHANGE UP (ref 150–400)
POTASSIUM SERPL-MCNC: 3.6 MMOL/L — SIGNIFICANT CHANGE UP (ref 3.5–5.3)
POTASSIUM SERPL-SCNC: 3.6 MMOL/L — SIGNIFICANT CHANGE UP (ref 3.5–5.3)
RBC # BLD: 2.46 M/UL — LOW (ref 4.2–5.8)
RBC # FLD: 23.7 % — HIGH (ref 10.3–14.5)
SODIUM SERPL-SCNC: 134 MMOL/L — LOW (ref 135–145)
SPECIMEN SOURCE: SIGNIFICANT CHANGE UP
SPECIMEN SOURCE: SIGNIFICANT CHANGE UP
WBC # BLD: 13.79 K/UL — HIGH (ref 3.8–10.5)
WBC # FLD AUTO: 13.79 K/UL — HIGH (ref 3.8–10.5)

## 2025-04-02 PROCEDURE — 99233 SBSQ HOSP IP/OBS HIGH 50: CPT

## 2025-04-02 PROCEDURE — 99232 SBSQ HOSP IP/OBS MODERATE 35: CPT

## 2025-04-02 PROCEDURE — G0545: CPT

## 2025-04-02 RX ORDER — HYDROMORPHONE/SOD CHLOR,ISO/PF 2 MG/10 ML
1 SYRINGE (ML) INJECTION EVERY 12 HOURS
Refills: 0 | Status: DISCONTINUED | OUTPATIENT
Start: 2025-04-02 | End: 2025-04-03

## 2025-04-02 RX ORDER — MAGNESIUM SULFATE 500 MG/ML
2 SYRINGE (ML) INJECTION ONCE
Refills: 0 | Status: COMPLETED | OUTPATIENT
Start: 2025-04-02 | End: 2025-04-02

## 2025-04-02 RX ORDER — GABAPENTIN 400 MG/1
600 CAPSULE ORAL
Refills: 0 | Status: DISCONTINUED | OUTPATIENT
Start: 2025-04-02 | End: 2025-04-05

## 2025-04-02 RX ORDER — HYDROMORPHONE/SOD CHLOR,ISO/PF 2 MG/10 ML
1 SYRINGE (ML) INJECTION ONCE
Refills: 0 | Status: DISCONTINUED | OUTPATIENT
Start: 2025-04-02 | End: 2025-04-02

## 2025-04-02 RX ORDER — HYDROMORPHONE/SOD CHLOR,ISO/PF 2 MG/10 ML
0.5 SYRINGE (ML) INJECTION DAILY
Refills: 0 | Status: DISCONTINUED | OUTPATIENT
Start: 2025-04-02 | End: 2025-04-03

## 2025-04-02 RX ORDER — HYDROMORPHONE/SOD CHLOR,ISO/PF 2 MG/10 ML
2 SYRINGE (ML) INJECTION EVERY 6 HOURS
Refills: 0 | Status: DISCONTINUED | OUTPATIENT
Start: 2025-04-02 | End: 2025-04-03

## 2025-04-02 RX ORDER — GABAPENTIN 400 MG/1
800 CAPSULE ORAL
Refills: 0 | Status: DISCONTINUED | OUTPATIENT
Start: 2025-04-02 | End: 2025-04-05

## 2025-04-02 RX ADMIN — Medication 650 MILLIGRAM(S): at 06:21

## 2025-04-02 RX ADMIN — Medication 2 MILLIGRAM(S): at 14:06

## 2025-04-02 RX ADMIN — Medication 25 GRAM(S): at 06:22

## 2025-04-02 RX ADMIN — LIDOCAINE HYDROCHLORIDE 1 PATCH: 20 JELLY TOPICAL at 13:32

## 2025-04-02 RX ADMIN — Medication 2 TABLET(S): at 21:36

## 2025-04-02 RX ADMIN — Medication 1 MILLIGRAM(S): at 04:02

## 2025-04-02 RX ADMIN — IPRATROPIUM BROMIDE AND ALBUTEROL SULFATE 3 MILLILITER(S): .5; 2.5 SOLUTION RESPIRATORY (INHALATION) at 21:59

## 2025-04-02 RX ADMIN — Medication 0.1 MILLIGRAM(S): at 06:20

## 2025-04-02 RX ADMIN — Medication 650 MILLIGRAM(S): at 18:56

## 2025-04-02 RX ADMIN — GABAPENTIN 800 MILLIGRAM(S): 400 CAPSULE ORAL at 21:35

## 2025-04-02 RX ADMIN — Medication 0.5 MILLIGRAM(S): at 09:00

## 2025-04-02 RX ADMIN — IPRATROPIUM BROMIDE AND ALBUTEROL SULFATE 3 MILLILITER(S): .5; 2.5 SOLUTION RESPIRATORY (INHALATION) at 09:25

## 2025-04-02 RX ADMIN — MUPIROCIN CALCIUM 1 APPLICATION(S): 20 CREAM TOPICAL at 06:23

## 2025-04-02 RX ADMIN — CLOTRIMAZOLE 1 APPLICATION(S): 1 CREAM TOPICAL at 19:06

## 2025-04-02 RX ADMIN — Medication 0.5 MILLIGRAM(S): at 08:04

## 2025-04-02 RX ADMIN — Medication 40 MILLIGRAM(S): at 06:20

## 2025-04-02 RX ADMIN — GABAPENTIN 600 MILLIGRAM(S): 400 CAPSULE ORAL at 06:19

## 2025-04-02 RX ADMIN — Medication 25 GRAM(S): at 13:34

## 2025-04-02 RX ADMIN — Medication 0.1 MILLIGRAM(S): at 21:36

## 2025-04-02 RX ADMIN — FUROSEMIDE 40 MILLIGRAM(S): 10 INJECTION INTRAMUSCULAR; INTRAVENOUS at 13:34

## 2025-04-02 RX ADMIN — POLYETHYLENE GLYCOL 3350 17 GRAM(S): 17 POWDER, FOR SOLUTION ORAL at 13:33

## 2025-04-02 RX ADMIN — CLOTRIMAZOLE 1 APPLICATION(S): 1 CREAM TOPICAL at 06:30

## 2025-04-02 RX ADMIN — Medication 1 APPLICATION(S): at 06:22

## 2025-04-02 RX ADMIN — CASPOFUNGIN ACETATE 260 MILLIGRAM(S): 5 INJECTION, POWDER, LYOPHILIZED, FOR SOLUTION INTRAVENOUS at 04:02

## 2025-04-02 RX ADMIN — MUPIROCIN CALCIUM 1 APPLICATION(S): 20 CREAM TOPICAL at 18:17

## 2025-04-02 RX ADMIN — Medication 25 GRAM(S): at 13:53

## 2025-04-02 RX ADMIN — AMLODIPINE BESYLATE 10 MILLIGRAM(S): 10 TABLET ORAL at 06:20

## 2025-04-02 RX ADMIN — FUROSEMIDE 40 MILLIGRAM(S): 10 INJECTION INTRAMUSCULAR; INTRAVENOUS at 06:30

## 2025-04-02 RX ADMIN — APIXABAN 5 MILLIGRAM(S): 2.5 TABLET, FILM COATED ORAL at 17:21

## 2025-04-02 RX ADMIN — IPRATROPIUM BROMIDE AND ALBUTEROL SULFATE 3 MILLILITER(S): .5; 2.5 SOLUTION RESPIRATORY (INHALATION) at 15:41

## 2025-04-02 RX ADMIN — IPRATROPIUM BROMIDE AND ALBUTEROL SULFATE 3 MILLILITER(S): .5; 2.5 SOLUTION RESPIRATORY (INHALATION) at 03:19

## 2025-04-02 RX ADMIN — Medication 0.1 MILLIGRAM(S): at 13:36

## 2025-04-02 RX ADMIN — SODIUM HYPOCHLORITE 1 APPLICATION(S): 0.12 SOLUTION TOPICAL at 17:26

## 2025-04-02 RX ADMIN — Medication 166.67 MILLIGRAM(S): at 06:22

## 2025-04-02 RX ADMIN — Medication 1 TABLET(S): at 13:33

## 2025-04-02 RX ADMIN — APIXABAN 5 MILLIGRAM(S): 2.5 TABLET, FILM COATED ORAL at 06:29

## 2025-04-02 RX ADMIN — SODIUM HYPOCHLORITE 1 APPLICATION(S): 0.12 SOLUTION TOPICAL at 06:23

## 2025-04-02 RX ADMIN — Medication 166.67 MILLIGRAM(S): at 15:04

## 2025-04-02 RX ADMIN — Medication 2 MILLIGRAM(S): at 19:21

## 2025-04-02 RX ADMIN — Medication 3 MILLIGRAM(S): at 21:36

## 2025-04-02 RX ADMIN — LIDOCAINE HYDROCHLORIDE 1 PATCH: 20 JELLY TOPICAL at 13:33

## 2025-04-02 RX ADMIN — OXYCODONE HYDROCHLORIDE 10 MILLIGRAM(S): 30 TABLET ORAL at 06:19

## 2025-04-02 RX ADMIN — Medication 166.67 MILLIGRAM(S): at 23:05

## 2025-04-02 RX ADMIN — GABAPENTIN 600 MILLIGRAM(S): 400 CAPSULE ORAL at 13:32

## 2025-04-02 RX ADMIN — Medication 2 MILLIGRAM(S): at 14:36

## 2025-04-02 RX ADMIN — QUETIAPINE FUMARATE 25 MILLIGRAM(S): 25 TABLET ORAL at 21:36

## 2025-04-02 RX ADMIN — Medication 25 GRAM(S): at 21:36

## 2025-04-02 NOTE — PROGRESS NOTE ADULT - ASSESSMENT
This is a 38 y/o M w/ PMHx AF (not on AC), HTN, BEA, pseudotumor cerebri s/p LP in 2024, initially presented to [place] on 2/24, SOB, LE edema with URI symptoms and sick contacts, noted to have severe ARDS, intubated however still hypoxia, cannulated for VV ECMO on 2/25, transferred to [facility] on 2/25, started on empiric Vanco, Zosyn for multifocal PNA, abdominal wall cellulitis, s/p trach placement by CT surgery on 3/7, ECMO decannulated on 3/7. Zosyn held on 3/9.  C/b fevers on 3/10, restarted on Zosyn, transferred to [facility] on 3/13, Bcx now w/ yeast.    #Candida albicans fungemia   #Fevers   #Multifocal PNA, abdominal wall cellulitis s/p Vanco/Zosyn  #ARDS, hypoxic respiratory failure requiring VV EMCO 2/2 multifocal PNA? s/p decannulation on 3/7    Overall, 38 y/o M w/ PMHx AF (not on AC), HTN, BEA, pseudotumor cerebri s/p LP in 2024 admitted to [facility] on 2/26 for ARDS, hypoxic respiratory failure requiring VV EMCO 2/2 multifocal PNA? s/p decannulation on 3/7, c/b abdominal wall cellulitis s/p Vancomycin/Zosyn, s/p trach on 3/7, in the setting of persistent fevers despite Zosyn, BCx now w/ yeast, Candida albicans   Unclear source for yeast in BCx, pt had all central lines removed on 3/7, not getting TPN, no abdominal pain on exam, PEG site well appearing.     CT A/P w/o clear abdominal source of fungemia, possibly 2/2 abdominal wall cellulitis? TTE w/o IE.  BCx 3/18 1/2 positive, 3/20 NGTD x 2.     Pt with worsening sepsis on 3/30, febrile to 103, WBC 16.   D/w wound care, sacral wound indurated, but no fluctuance does not appear infected.   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   Unclear source at this time    Recommendations;   1. C/w Vancomycin decrease dose to 1.25 g q8, Zosyn 3.375 g 8 for now,   2. Caspofungin 70 mg daily for now, end date 4/2, however given sepsis can c/w for now  3. U/A without pyuria, F/u BCx NGTD, will stop Vancomycin soon     Thank you for consulting us and involving us in the management of this patient's case. In addition to reviewing history, imaging, documents, labs, microbiology, and infection control strategies and potential issues.     ID will continue to follow    Shailesh Owens M.D.  Attending Physician  Division of Infectious Diseases  Department of Medicine    Please contact through MS Teams message.  Office: [phone] (after 5 PM or weekend)

## 2025-04-02 NOTE — PROGRESS NOTE ADULT - ASSESSMENT
39M presents with R foot plantar forefoot hematoma/partial gangrene  - Patient seen and evaluated  - Right foot digits 4 & 5 partial thickness dry gangrene. Right foot plantar lateral forefoot wound to subQ, no malodor, no pus, no acute signs of infection. Left foot digit 1,2,4 partial thickness dry gangrene. Left foot lateral plantar forefoot wound to subQ, no malodor, no pus, no acute signs of infection.  - No culture taken secondary to no acute signs of infection   - Wound care orders placed for daily dressing changes   - No acute pod intervention, local wound care only   - Wound care instruction and follow up information in discharge note provider

## 2025-04-02 NOTE — PROGRESS NOTE ADULT - SUBJECTIVE AND OBJECTIVE BOX
CHIEF COMPLAINT:Patient is a 39y old  Male who presents with a chief complaint of sob (02 Mar 2025 06:19)      INTERVAL EVENTS:     ROS: Seen by bedside during AM rounds     OBJECTIVE:  ICU Vital Signs Last 24 Hrs  T(C): 37.5 (02 Apr 2025 02:00), Max: 38.6 (01 Apr 2025 20:00)  T(F): 99.5 (02 Apr 2025 02:00), Max: 101.5 (01 Apr 2025 20:00)  HR: 119 (02 Apr 2025 06:55) (113 - 130)  BP: 125/60 (02 Apr 2025 02:00) (125/60 - 151/89)  BP(mean): --  ABP: --  ABP(mean): --  RR: 20 (02 Apr 2025 02:00) (19 - 22)  SpO2: 98% (02 Apr 2025 06:55) (95% - 100%)    O2 Parameters below as of 02 Apr 2025 02:00  Patient On (Oxygen Delivery Method): BiPAP/CPAP              04-01 @ 07:01  -  04-02 @ 07:00  --------------------------------------------------------  IN: 2000 mL / OUT: 2400 mL / NET: -400 mL      CAPILLARY BLOOD GLUCOSE      POCT Blood Glucose.: 115 mg/dL (01 Apr 2025 21:08)      PHYSICAL EXAM:  General:   HEENT:   Lymph Nodes:  Neck:   Respiratory:   Cardiovascular:   Abdomen:   Extremities:   Skin:   Neurological:  Psychiatry:        HOSPITAL MEDICATIONS:  MEDICATIONS  (STANDING):  albuterol/ipratropium for Nebulization 3 milliLiter(s) Nebulizer every 6 hours  amLODIPine   Tablet 10 milliGRAM(s) Oral daily  apixaban 5 milliGRAM(s) Oral every 12 hours  caspofungin IVPB 70 milliGRAM(s) IV Intermittent every 24 hours  chlorhexidine 2% Cloths 1 Application(s) Topical <User Schedule>  cloNIDine 0.1 milliGRAM(s) Oral every 8 hours  clotrimazole 1% Cream 1 Application(s) Topical two times a day  Dakins Solution - 1/4 Strength 1 Application(s) Topical two times a day  dextrose 50% Injectable 25 Gram(s) IV Push once  dextrose 50% Injectable 12.5 Gram(s) IV Push once  dextrose 50% Injectable 25 Gram(s) IV Push once  furosemide    Tablet 40 milliGRAM(s) Oral two times a day  gabapentin 600 milliGRAM(s) Oral every 8 hours  glucagon  Injectable 1 milliGRAM(s) IntraMuscular once  insulin lispro (ADMELOG) corrective regimen sliding scale   SubCutaneous three times a day before meals  insulin lispro (ADMELOG) corrective regimen sliding scale   SubCutaneous at bedtime  lidocaine   4% Patch 1 Patch Transdermal daily  lidocaine   4% Patch 1 Patch Transdermal daily  magnesium sulfate  IVPB 2 Gram(s) IV Intermittent once  melatonin 3 milliGRAM(s) Oral at bedtime  multivitamin 1 Tablet(s) Oral daily  mupirocin 2% Ointment 1 Application(s) Topical two times a day  pantoprazole    Tablet 40 milliGRAM(s) Oral before breakfast  piperacillin/tazobactam IVPB.. 3.375 Gram(s) IV Intermittent every 8 hours  polyethylene glycol 3350 17 Gram(s) Oral daily  QUEtiapine 25 milliGRAM(s) Oral at bedtime  senna 2 Tablet(s) Oral at bedtime  vancomycin  IVPB 1250 milliGRAM(s) IV Intermittent every 8 hours    MEDICATIONS  (PRN):  acetaminophen     Tablet .. 650 milliGRAM(s) Oral every 6 hours PRN Temp greater or equal to 38C (100.4F), Mild Pain (1 - 3), Moderate Pain (4 - 6)  dextrose Oral Gel 15 Gram(s) Oral once PRN Blood Glucose LESS THAN 70 milliGRAM(s)/deciliter  HYDROmorphone  Injectable 0.5 milliGRAM(s) IV Push every 6 hours PRN MOd-Severe Pain (4 - 10)  oxyCODONE    IR 10 milliGRAM(s) Oral every 4 hours PRN MOD/SEVERE PAIN 4-10      LABS:                        8.0    13.79 )-----------( 371      ( 02 Apr 2025 04:56 )             22.1     04-02    134[L]  |  90[L]  |  6[L]  ----------------------------<  99  3.6   |  29  |  0.83    Ca    8.8      02 Apr 2025 04:56  Phos  4.8     04-02  Mg     1.40     04-02        Urinalysis Basic - ( 02 Apr 2025 04:56 )    Color: x / Appearance: x / SG: x / pH: x  Gluc: 99 mg/dL / Ketone: x  / Bili: x / Urobili: x   Blood: x / Protein: x / Nitrite: x   Leuk Esterase: x / RBC: x / WBC x   Sq Epi: x / Non Sq Epi: x / Bacteria: x         CHIEF COMPLAINT:Patient is a 39y old  Male who presents with a chief complaint of sob (02 Mar 2025 06:19)      INTERVAL EVENTS:     ROS: Seen by bedside during AM rounds     OBJECTIVE:  ICU Vital Signs Last 24 Hrs  T(C): 37.5 (02 Apr 2025 02:00), Max: 38.6 (01 Apr 2025 20:00)  T(F): 99.5 (02 Apr 2025 02:00), Max: 101.5 (01 Apr 2025 20:00)  HR: 119 (02 Apr 2025 06:55) (113 - 130)  BP: 125/60 (02 Apr 2025 02:00) (125/60 - 151/89)  BP(mean): --  ABP: --  ABP(mean): --  RR: 20 (02 Apr 2025 02:00) (19 - 22)  SpO2: 98% (02 Apr 2025 06:55) (95% - 100%)    O2 Parameters below as of 02 Apr 2025 02:00  Patient On (Oxygen Delivery Method): BiPAP/CPAP              04-01 @ 07:01  -  04-02 @ 07:00  --------------------------------------------------------  IN: 2000 mL / OUT: 2400 mL / NET: -400 mL      CAPILLARY BLOOD GLUCOSE      POCT Blood Glucose.: 115 mg/dL (01 Apr 2025 21:08)      PHYSICAL EXAM:  General: NAD   Neck: Dressing overlying preexisting trach tube stoma  Cards: S1/S2, no murmurs   Pulm: CTA bilaterally. No wheezes.   Abdomen: Morbidly obese abdomen. Soft, NTND. (+) PEG noted, site c/d/i.   Extremities: Mild b/l LE pedal edema. Extremities warm to touch.  Neurology: Awake/alert. 5/5 motor strength b/l UE. Weakness b/l LE. Following commands.   Skin: warm to touch, color appropriate for ethnicity. Refer to RN assessment for further details.        HOSPITAL MEDICATIONS:  MEDICATIONS  (STANDING):  albuterol/ipratropium for Nebulization 3 milliLiter(s) Nebulizer every 6 hours  amLODIPine   Tablet 10 milliGRAM(s) Oral daily  apixaban 5 milliGRAM(s) Oral every 12 hours  caspofungin IVPB 70 milliGRAM(s) IV Intermittent every 24 hours  chlorhexidine 2% Cloths 1 Application(s) Topical <User Schedule>  cloNIDine 0.1 milliGRAM(s) Oral every 8 hours  clotrimazole 1% Cream 1 Application(s) Topical two times a day  Dakins Solution - 1/4 Strength 1 Application(s) Topical two times a day  dextrose 50% Injectable 25 Gram(s) IV Push once  dextrose 50% Injectable 12.5 Gram(s) IV Push once  dextrose 50% Injectable 25 Gram(s) IV Push once  furosemide    Tablet 40 milliGRAM(s) Oral two times a day  gabapentin 600 milliGRAM(s) Oral every 8 hours  glucagon  Injectable 1 milliGRAM(s) IntraMuscular once  insulin lispro (ADMELOG) corrective regimen sliding scale   SubCutaneous three times a day before meals  insulin lispro (ADMELOG) corrective regimen sliding scale   SubCutaneous at bedtime  lidocaine   4% Patch 1 Patch Transdermal daily  lidocaine   4% Patch 1 Patch Transdermal daily  magnesium sulfate  IVPB 2 Gram(s) IV Intermittent once  melatonin 3 milliGRAM(s) Oral at bedtime  multivitamin 1 Tablet(s) Oral daily  mupirocin 2% Ointment 1 Application(s) Topical two times a day  pantoprazole    Tablet 40 milliGRAM(s) Oral before breakfast  piperacillin/tazobactam IVPB.. 3.375 Gram(s) IV Intermittent every 8 hours  polyethylene glycol 3350 17 Gram(s) Oral daily  QUEtiapine 25 milliGRAM(s) Oral at bedtime  senna 2 Tablet(s) Oral at bedtime  vancomycin  IVPB 1250 milliGRAM(s) IV Intermittent every 8 hours    MEDICATIONS  (PRN):  acetaminophen     Tablet .. 650 milliGRAM(s) Oral every 6 hours PRN Temp greater or equal to 38C (100.4F), Mild Pain (1 - 3), Moderate Pain (4 - 6)  dextrose Oral Gel 15 Gram(s) Oral once PRN Blood Glucose LESS THAN 70 milliGRAM(s)/deciliter  HYDROmorphone  Injectable 0.5 milliGRAM(s) IV Push every 6 hours PRN MOd-Severe Pain (4 - 10)  oxyCODONE    IR 10 milliGRAM(s) Oral every 4 hours PRN MOD/SEVERE PAIN 4-10      LABS:                        8.0    13.79 )-----------( 371      ( 02 Apr 2025 04:56 )             22.1     04-02    134[L]  |  90[L]  |  6[L]  ----------------------------<  99  3.6   |  29  |  0.83    Ca    8.8      02 Apr 2025 04:56  Phos  4.8     04-02  Mg     1.40     04-02        Urinalysis Basic - ( 02 Apr 2025 04:56 )    Color: x / Appearance: x / SG: x / pH: x  Gluc: 99 mg/dL / Ketone: x  / Bili: x / Urobili: x   Blood: x / Protein: x / Nitrite: x   Leuk Esterase: x / RBC: x / WBC x   Sq Epi: x / Non Sq Epi: x / Bacteria: x         CHIEF COMPLAINT:Patient is a 39y old  Male who presents with a chief complaint of sob (02 Mar 2025 06:19)      INTERVAL EVENTS: febrile overnight (tmax 101.5F). Remains on Caspo, Vanc/Zosyn. C/o persistent LE pain, titrating pain regimen.     ROS: Seen by bedside during AM rounds     OBJECTIVE:  ICU Vital Signs Last 24 Hrs  T(C): 37.5 (02 Apr 2025 02:00), Max: 38.6 (01 Apr 2025 20:00)  T(F): 99.5 (02 Apr 2025 02:00), Max: 101.5 (01 Apr 2025 20:00)  HR: 119 (02 Apr 2025 06:55) (113 - 130)  BP: 125/60 (02 Apr 2025 02:00) (125/60 - 151/89)  BP(mean): --  ABP: --  ABP(mean): --  RR: 20 (02 Apr 2025 02:00) (19 - 22)  SpO2: 98% (02 Apr 2025 06:55) (95% - 100%)    O2 Parameters below as of 02 Apr 2025 02:00  Patient On (Oxygen Delivery Method): BiPAP/CPAP              04-01 @ 07:01  -  04-02 @ 07:00  --------------------------------------------------------  IN: 2000 mL / OUT: 2400 mL / NET: -400 mL      CAPILLARY BLOOD GLUCOSE      POCT Blood Glucose.: 115 mg/dL (01 Apr 2025 21:08)      PHYSICAL EXAM:  General: NAD   Neck: Dressing overlying preexisting trach tube stoma  Cards: S1/S2, no murmurs   Pulm: CTA bilaterally. No wheezes.   Abdomen: Morbidly obese abdomen. Soft, NTND. (+) PEG noted, site c/d/i.   Extremities: Mild b/l LE pedal edema. Extremities warm to touch.  Neurology: Awake/alert. 5/5 motor strength b/l UE. Weakness b/l LE. Following commands.   Skin: warm to touch, color appropriate for ethnicity. Refer to RN assessment for further details.        HOSPITAL MEDICATIONS:  MEDICATIONS  (STANDING):  albuterol/ipratropium for Nebulization 3 milliLiter(s) Nebulizer every 6 hours  amLODIPine   Tablet 10 milliGRAM(s) Oral daily  apixaban 5 milliGRAM(s) Oral every 12 hours  caspofungin IVPB 70 milliGRAM(s) IV Intermittent every 24 hours  chlorhexidine 2% Cloths 1 Application(s) Topical <User Schedule>  cloNIDine 0.1 milliGRAM(s) Oral every 8 hours  clotrimazole 1% Cream 1 Application(s) Topical two times a day  Dakins Solution - 1/4 Strength 1 Application(s) Topical two times a day  dextrose 50% Injectable 25 Gram(s) IV Push once  dextrose 50% Injectable 12.5 Gram(s) IV Push once  dextrose 50% Injectable 25 Gram(s) IV Push once  furosemide    Tablet 40 milliGRAM(s) Oral two times a day  gabapentin 600 milliGRAM(s) Oral every 8 hours  glucagon  Injectable 1 milliGRAM(s) IntraMuscular once  insulin lispro (ADMELOG) corrective regimen sliding scale   SubCutaneous three times a day before meals  insulin lispro (ADMELOG) corrective regimen sliding scale   SubCutaneous at bedtime  lidocaine   4% Patch 1 Patch Transdermal daily  lidocaine   4% Patch 1 Patch Transdermal daily  magnesium sulfate  IVPB 2 Gram(s) IV Intermittent once  melatonin 3 milliGRAM(s) Oral at bedtime  multivitamin 1 Tablet(s) Oral daily  mupirocin 2% Ointment 1 Application(s) Topical two times a day  pantoprazole    Tablet 40 milliGRAM(s) Oral before breakfast  piperacillin/tazobactam IVPB.. 3.375 Gram(s) IV Intermittent every 8 hours  polyethylene glycol 3350 17 Gram(s) Oral daily  QUEtiapine 25 milliGRAM(s) Oral at bedtime  senna 2 Tablet(s) Oral at bedtime  vancomycin  IVPB 1250 milliGRAM(s) IV Intermittent every 8 hours    MEDICATIONS  (PRN):  acetaminophen     Tablet .. 650 milliGRAM(s) Oral every 6 hours PRN Temp greater or equal to 38C (100.4F), Mild Pain (1 - 3), Moderate Pain (4 - 6)  dextrose Oral Gel 15 Gram(s) Oral once PRN Blood Glucose LESS THAN 70 milliGRAM(s)/deciliter  HYDROmorphone  Injectable 0.5 milliGRAM(s) IV Push every 6 hours PRN MOd-Severe Pain (4 - 10)  oxyCODONE    IR 10 milliGRAM(s) Oral every 4 hours PRN MOD/SEVERE PAIN 4-10      LABS:                        8.0    13.79 )-----------( 371      ( 02 Apr 2025 04:56 )             22.1     04-02    134[L]  |  90[L]  |  6[L]  ----------------------------<  99  3.6   |  29  |  0.83    Ca    8.8      02 Apr 2025 04:56  Phos  4.8     04-02  Mg     1.40     04-02        Urinalysis Basic - ( 02 Apr 2025 04:56 )    Color: x / Appearance: x / SG: x / pH: x  Gluc: 99 mg/dL / Ketone: x  / Bili: x / Urobili: x   Blood: x / Protein: x / Nitrite: x   Leuk Esterase: x / RBC: x / WBC x   Sq Epi: x / Non Sq Epi: x / Bacteria: x

## 2025-04-02 NOTE — PROGRESS NOTE ADULT - ASSESSMENT
38 YO M with PMHx of morbid obesity, BEA on CPAP, pAFIB (not on AC), HTN, and BL papilledema attributed to pseudotumor cerebri who presented initially to [facility] on 2/24 with SOB and BL LE edema. Found with URI outpatient with progressive SOB, and concern for noted for MFPNA on imaging. Course progressed with AHRF with worsening O2 demand, respiratory distress, secretions, and somnolence requiring intubation (difficult with success after 6 attempts) in ED and admission to [facility] MICU. While in MICU, patient noted with increasing O2 demand with no improvement despite optimal vent management. Concern noted for progressive ARDS, unable to prone given morbid obesity, and ultimately cannulated for vvECMO on 2/25 and transferred to [facility] for further management on 2/26. While at [facility], tx with diuresis and ABX, and ultimately decannulated and s/p 60XLTCP trach and PEG on 3/7. Patient ultimately transferred to RCU on 3/11 and course complicated by recurrent high grade fevers and found with fungemia.    NEUROLOGY  # Hx of Pseudotumor Cerebri   - s/p LP in 2024 with high opening pressure  - s/p MRI 2024 with possible pituitary mass but unclear because of pressure effect from pseudotumor cerebri causing partially empty sella.  - Prolactin elevated   - ACTH low but recieved steroids during admission   - FT4 low normal and TSH normal, but given FT4 low normal TSH should be higher   - Discuss endocrine consult for inhouse work up vs outpatient.     # Sedation   - Weaned off sedation in ICU   - Nimbex turned off 2/27   - Prop turned off 3/9   - Precedex turned off and catapres started 3/11  - Continue on catapres 0.2 TID (decreased 3/25) - taper done 3/31 to 0.1 tid    # Insomnia   - Patient worked night shift x 15 years   - Trouble sleeping at night leading to day time sleepiness and poor participation with PT  - Continue on Seroquel 25 QHS (increased 3/18)   - Continue on melatonin   - Continue on neurontin as below    # BL LE neuropathy   - CIPN concern   - Neurontin increased to 300mg Q8H with relief   - added Percocet Q4H for moderate pain (3/25) and continue on Dilaudid Q6H for severe pain   - Pain mgt called to consult - with recs for tylenol ATC, NEurontin increased to 400mg q6, Oxycodone 5 q4h prn , Dilaudid for BREAKTHROUGH pain only, Lidocaine patches on each leg, ice prn to feet  - Some improvement in pain - PT consult for TENS,   - oxycodone inc to 10mg as pt with no relief /buttocks area indurated /tender CT ordered   - 4/1 d/w pain mgt increase gabapentin to 600 TID -done     CARDIOVASCULAR  # HTN   - HTN noted in ICU requiring cardene and esmolol GTTs   - Lisinopril dc'ed to increase catapres   - Continue on norvasc 10 and catapres 0.3 TID   - Monitor BP     # AFIB   - Hx of pAFIB   - No RVR episodes or pAFIB episodes in ICU   - No rate control medications needed  - Monitor on telemetry     # RV dilation second to PHTN   - POCUS on arrival to [facility] with RVE concern   - TTE 10/2024 with EF 55-60 with normal LVSF, moderate LVH and normal RVSF and size with no concern for pHTN   - TTE on 2/25 at  with EF 61 and normal LVSF, but enlarged RV size with reduced RVSF, mild TR, mild PHTN with PASP 49, and dilated IVC to 3.4cn, but normal TAPSE 2.1.  - Continue on aggressive diuresis in ICU    - TTE 3/11 with RVSF reduced with TAPSE 1.6. IVC improved to 2.12cm.   - Continue on lasix 40 PO BID   - Monitor IO/BMP    RESPIRATORY  # ARDS second to MFPNA   - Hx of BEA on CPAP presented to  post URI with SOB and found with AHRF with progression to ARDS second to post viral MFPNA   - Intubated 2/25   - Cannulated for vvECMO 2/26   - s/p 60XLTCP tracheostomy 3/7   - Decannulated 3/7   - CTSx removed tracheostomy and ECMO sutures on 3/17   - Continue on TC QD with PMV as able with strong phonation  - Continue on PS 18/10/40 QHS given OHS   - Continue on nebs and HTS   -  trials continue and now trialing overnight with BIPAP for OHS (10/8/12)  - trend VBG QD  - Decannulated 3/28   - Using nasal cannula 4L     GI  # Dysphagia   - s/p PEG 3/7   - Attempted green dye on 3/13 and failed  - Continue on TF  - Continue on miralax and senna  - RPT green dye passed 3/19  - 3/20 Passed FEEST - on soft bite sized with thin liquids     # Mild transaminitis   - LFTs elevated in ICU with TBILI elevated likely from ECMO hemolysis   - US ABD with hepatic steatosis   - Trend LFTs      RENAL  # ELLA   - ELLA likely from cardiorenal with RVE   - Diuresed in ICU and improved   - Monitor renal function and UOP     # Hypernatremia   - Hypernatremia with fever spikes   - Fever curve improved and attempting to wean FWF   - Continue on  Q6H (decreased 3/19)    INFECTIOUS DISEASE  # Recurrent fevers with fungemia from unknown source   - Recurrent fevers post ECMO decannulation thought initially to be cytokine surge   - BCx, SCx, UCx, RVP and MRSA RPT on 3/12 negative   - Completed zosyn (3/12-3/18) empirically with improved WBC , however recurrent fevers noted.   - RPT SCx, UA, CXR and RVP negative  - TTE 3/17 with no IE  - PanCT 3/17 with PNA and persistent panniculitis   - BCx 3/15 and 3/16 with candida albicans.   - Concern for possible source from panniculitis   - Continue on caspo (3/17 - ) and RPT BCx 3/18 negative  - ID following  - plan for 2 week course after negative BCx to 4/2  - 3/20 Bcx- NGTD  - 3/30 spiked temp cx's sent + ua and c/s pending Bcx neg to date - started on vanco /zosyn ID following       # MFPNA and abdominal pannus cellulitis   - Presented to  post URI with SOB and found with AHRF with progression to ARDS second to post viral MFPNA   - RVP, legionella, strept, BAL, BCx, UCx, HIV, PCP, and adenovirus PCR negative   - Initially started on multiple agents in ICU and ultimatelty completed zosyn (2/26-3/9) ZMAX (2/25-2/26) and vanco (2/26-3/1)     # R/o Sacral OM  - Noted with hard indurated sacral wound by Wound Care Team  - CT Pelvis (3/29) revaeling superficial skin thickening overlying the sacrum with underlying edema of the subcutaneous fat, in keeping with the clinical history of sacral wound. No evidence of underlying tract or drainable fluid collection.    # Penile discharge   - GC/ chlamydia negative   - HIV negative   - Monitor for now    # PPX   - MRSA PCR positive and completed bactroban (2/26-3/3)     HEME   # Anemia likely second to ECMO circuit vs AOCD   - HH low in ICU second to ECMO circuit   - HH dropping again today however no bleeding noted   - Microcytic and hemochromic, sent anemia panel 3/19  - Trend HH     VASCULAR   # R IJ and BL LE DVTs   - Post ECMO dopplers on 3/9 negative for BL UE/ LE DVTs.   - Subsequent post ECMO dopplers performed on 3/14 and noted with acute, non-occlusive deep vein thrombosis noted within the right internal jugular vein. acute, occlusive deep vein thromboses noted within the right and left peroneal veins at both mid calves, and age-indeterminate, occlusive deep vein thrombosis visualized within the left gastrocnemius vein at the proximal calf.     - Continue on argatroban GTT and change to eliquis     PODIATRY   # BL plantar feet blisters likely pressors induced  - Seen by podiatry and blisters lanced on 3/4 and again on 3/18  - Continue on local wound care per podiatry   - Podiatry following  - OK for WBAT will fu with PT for offloading shoe if appropriate  - Pain mgt consult   - Podiatry FU 3/28 done /following     ENDOCRINE  # Pre-DM2   - A1C 6.7  - Continue on ISS   - Monitor FS   - IF no demand then stop ISS     LINES/ TUBES  - R IJ and R FEM ECMO (2/26-3/7)     SKIN  # Pannus canndial intertrigo   # Sacrum/ buttock MAD  - Continue on clotrimazole cream   - WOC appreciated   -Seen by WC pt noted to have oozing from Buttock wound surgicel placed by wound care On follow up no further bleeding  - Wound care placed orders   - No bleeding     ETHICS/ GOC    - FULL CODE     DISPO - PT following  Seen by PMR for acute rehab per recs      40 YO M with PMHx of morbid obesity, BEA on CPAP, pAFIB (not on AC), HTN, and BL papilledema attributed to pseudotumor cerebri who presented initially to [facility] on 2/24 with SOB and BL LE edema. Found with URI outpatient with progressive SOB, and concern for noted for MFPNA on imaging. Course progressed with AHRF with worsening O2 demand, respiratory distress, secretions, and somnolence requiring intubation (difficult with success after 6 attempts) in ED and admission to [facility] MICU. While in MICU, patient noted with increasing O2 demand with no improvement despite optimal vent management. Concern noted for progressive ARDS, unable to prone given morbid obesity, and ultimately cannulated for vvECMO on 2/25 and transferred to [facility] for further management on 2/26. While at [facility], tx with diuresis and ABX, and ultimately decannulated and s/p 60XLTCP trach and PEG on 3/7. Patient ultimately transferred to RCU on 3/11 and course complicated by recurrent high grade fevers and found with fungemia.    NEUROLOGY  # Hx of Pseudotumor Cerebri   - s/p LP in 2024 with high opening pressure  - s/p MRI 2024 with possible pituitary mass but unclear because of pressure effect from pseudotumor cerebri causing partially empty sella.  - Prolactin elevated   - ACTH low but recieved steroids during admission   - FT4 low normal and TSH normal, but given FT4 low normal TSH should be higher   - Discuss endocrine consult for inhouse work up vs outpatient.     # Sedation   - Weaned off sedation in ICU   - Nimbex turned off 2/27   - Prop turned off 3/9   - Precedex turned off and catapres started 3/11  - Continue on catapres 0.2 TID (decreased 3/25) - taper done 3/31 to 0.1 tid    # Insomnia   - Patient worked night shift x 15 years   - Trouble sleeping at night leading to day time sleepiness and poor participation with PT  - Continue on Seroquel 25 QHS (increased 3/18)   - Continue on melatonin   - Continue on neurontin as below    # BL LE neuropathy   - CIPN concern   - Neurontin increased to 300mg Q8H with relief   - added Percocet Q4H for moderate pain (3/25) and continue on Dilaudid Q6H for severe pain   - Pain mgt called to consult - with recs for tylenol ATC, NEurontin increased to 400mg q6, Oxycodone 5 q4h prn , Dilaudid for BREAKTHROUGH pain only, Lidocaine patches on each leg, ice prn to feet  - Some improvement in pain - PT consult for TENS,   - oxycodone inc to 10mg as pt with no relief /buttocks area indurated /tender CT ordered   - C/w Gabapentin : 600/600/800 and uptitrate as required  - Switch IV Dilaudid to PO Dilaudid 2mg Q6H PRN severe and 1mg Q12H PRN moderate pain     CARDIOVASCULAR  # HTN   - HTN noted in ICU requiring cardene and esmolol GTTs   - Lisinopril dc'ed to increase catapres   - Continue on norvasc 10 and catapres 0.3 TID   - Monitor BP     # AFIB   - Hx of pAFIB   - No RVR episodes or pAFIB episodes in ICU   - No rate control medications needed  - Monitor on telemetry     # RV dilation second to PHTN   - POCUS on arrival to [facility] with RVE concern   - TTE 10/2024 with EF 55-60 with normal LVSF, moderate LVH and normal RVSF and size with no concern for pHTN   - TTE on 2/25 at  with EF 61 and normal LVSF, but enlarged RV size with reduced RVSF, mild TR, mild PHTN with PASP 49, and dilated IVC to 3.4cn, but normal TAPSE 2.1.  - Continue on aggressive diuresis in ICU    - TTE 3/11 with RVSF reduced with TAPSE 1.6. IVC improved to 2.12cm.   - Continue on lasix 40 PO BID   - Monitor IO/BMP    RESPIRATORY  # ARDS second to MFPNA   - Hx of BEA on CPAP presented to  post URI with SOB and found with AHRF with progression to ARDS second to post viral MFPNA   - Intubated 2/25   - Cannulated for vvECMO 2/26   - s/p 60XLTCP tracheostomy 3/7   - Decannulated 3/7   - CTSx removed tracheostomy and ECMO sutures on 3/17   - Continue on TC QD with PMV as able with strong phonation  - Continue on PS 18/10/40 QHS given OHS   - Continue on nebs and HTS   -  trials continue and now trialing overnight with BIPAP for OHS (10/8/12)  - trend VBG QD  - Decannulated 3/28   - Using nasal cannula 4L     GI  # Dysphagia   - s/p PEG 3/7   - Attempted green dye on 3/13 and failed  - Continue on TF  - Continue on miralax and senna  - RPT green dye passed 3/19  - 3/20 Passed FEEST - on soft bite sized with thin liquids     # Mild transaminitis   - LFTs elevated in ICU with TBILI elevated likely from ECMO hemolysis   - US ABD with hepatic steatosis   - Trend LFTs      RENAL  # ELLA   - ELLA likely from cardiorenal with RVE   - Diuresed in ICU and improved   - Monitor renal function and UOP     # Hypernatremia   - Hypernatremia with fever spikes   - Fever curve improved and attempting to wean FWF   - Continue on  Q6H (decreased 3/19)    INFECTIOUS DISEASE  # Recurrent fevers with fungemia from unknown source   - Recurrent fevers post ECMO decannulation thought initially to be cytokine surge   - BCx, SCx, UCx, RVP and MRSA RPT on 3/12 negative   - Completed zosyn (3/12-3/18) empirically with improved WBC , however recurrent fevers noted.   - RPT SCx, UA, CXR and RVP negative  - TTE 3/17 with no IE  - PanCT 3/17 with PNA and persistent panniculitis   - BCx 3/15 and 3/16 with candida albicans.   - Concern for possible source from panniculitis   - Continue on caspo (3/17 - ) and RPT BCx 3/18 negative  - ID following  - plan for 2 week course after negative BCx to 4/2  - 3/20 Bcx- NGTD  - 3/30 spiked temp cx's sent + ua and c/s pending Bcx neg to date - started on vanco /zosyn ID following       # MFPNA and abdominal pannus cellulitis   - Presented to  post URI with SOB and found with AHRF with progression to ARDS second to post viral MFPNA   - RVP, legionella, strept, BAL, BCx, UCx, HIV, PCP, and adenovirus PCR negative   - Initially started on multiple agents in ICU and ultimatelty completed zosyn (2/26-3/9) ZMAX (2/25-2/26) and vanco (2/26-3/1)     # R/o Sacral OM  - Noted with hard indurated sacral wound by Wound Care Team  - CT Pelvis (3/29) revaeling superficial skin thickening overlying the sacrum with underlying edema of the subcutaneous fat, in keeping with the clinical history of sacral wound. No evidence of underlying tract or drainable fluid collection.    # Penile discharge   - GC/ chlamydia negative   - HIV negative   - Monitor for now    # PPX   - MRSA PCR positive and completed bactroban (2/26-3/3)     HEME   # Anemia likely second to ECMO circuit vs AOCD   - HH low in ICU second to ECMO circuit   - HH dropping again today however no bleeding noted   - Microcytic and hemochromic, sent anemia panel 3/19  - Trend HH     VASCULAR   # R IJ and BL LE DVTs   - Post ECMO dopplers on 3/9 negative for BL UE/ LE DVTs.   - Subsequent post ECMO dopplers performed on 3/14 and noted with acute, non-occlusive deep vein thrombosis noted within the right internal jugular vein. acute, occlusive deep vein thromboses noted within the right and left peroneal veins at both mid calves, and age-indeterminate, occlusive deep vein thrombosis visualized within the left gastrocnemius vein at the proximal calf.     - Continue on argatroban GTT and change to eliquis     PODIATRY   # BL plantar feet blisters likely pressors induced  - Seen by podiatry and blisters lanced on 3/4 and again on 3/18  - Continue on local wound care per podiatry   - Podiatry following  - OK for WBAT will fu with PT for offloading shoe if appropriate  - Pain mgt consult   - Podiatry FU 3/28 done /following     ENDOCRINE  # Pre-DM2   - A1C 6.7  - Continue on ISS   - Monitor FS   - IF no demand then stop ISS     LINES/ TUBES  - R IJ and R FEM ECMO (2/26-3/7)     SKIN  # Pannus canndial intertrigo   # Sacrum/ buttock MAD  - Continue on clotrimazole cream   - WOC appreciated   -Seen by WC pt noted to have oozing from Buttock wound surgicel placed by wound care On follow up no further bleeding  - Wound care placed orders   - No bleeding     ETHICS/ GOC    - FULL CODE     DISPO - PT following  Seen by PMR for acute rehab per recs

## 2025-04-02 NOTE — PROGRESS NOTE ADULT - SUBJECTIVE AND OBJECTIVE BOX
Patient is a 39y old  Male who presents with a chief complaint of sob (02 Mar 2025 06:19)       INTERVAL HPI/OVERNIGHT EVENTS:  Patient seen and evaluated at bedside.  Pt is resting comfortable in NAD. Denies N/V/F/C.  Pain rated at X/10    Allergies    No Known Allergies    Intolerances        Vital Signs Last 24 Hrs  T(C): 37.5 (02 Apr 2025 02:00), Max: 38.6 (01 Apr 2025 20:00)  T(F): 99.5 (02 Apr 2025 02:00), Max: 101.5 (01 Apr 2025 20:00)  HR: 119 (02 Apr 2025 06:55) (119 - 130)  BP: 125/60 (02 Apr 2025 02:00) (125/60 - 151/89)  BP(mean): --  RR: 20 (02 Apr 2025 02:00) (19 - 22)  SpO2: 98% (02 Apr 2025 06:55) (95% - 100%)    Parameters below as of 02 Apr 2025 02:00  Patient On (Oxygen Delivery Method): BiPAP/CPAP        LABS:                        8.0    13.79 )-----------( 371      ( 02 Apr 2025 04:56 )             22.1     04-02    134[L]  |  90[L]  |  6[L]  ----------------------------<  99  3.6   |  29  |  0.83    Ca    8.8      02 Apr 2025 04:56  Phos  4.8     04-02  Mg     1.40     04-02        Urinalysis Basic - ( 02 Apr 2025 04:56 )    Color: x / Appearance: x / SG: x / pH: x  Gluc: 99 mg/dL / Ketone: x  / Bili: x / Urobili: x   Blood: x / Protein: x / Nitrite: x   Leuk Esterase: x / RBC: x / WBC x   Sq Epi: x / Non Sq Epi: x / Bacteria: x      CAPILLARY BLOOD GLUCOSE      POCT Blood Glucose.: 128 mg/dL (02 Apr 2025 07:43)  POCT Blood Glucose.: 115 mg/dL (01 Apr 2025 21:08)  POCT Blood Glucose.: 104 mg/dL (01 Apr 2025 17:02)  POCT Blood Glucose.: 145 mg/dL (01 Apr 2025 11:18)  POCT Blood Glucose.: 97 mg/dL (01 Apr 2025 08:47)      Lower Extremity Physical Exam:  Vascular: DP 1/4 B/L, PT 0/4, B/L secondary to +3 pitting edema, CFT <3 seconds B/L, Temperature gradient warm to cool, B/L.   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 Right foot plantar lateral forefoot wound to subQ, no malodor, no pus, no acute signs of infection. Left foot digit 1,2,4 partial thickness dry gangrene. Left foot lateral plantar forefoot wound to subQ, no malodor, no pus, no acute signs of infection.    RADIOLOGY & ADDITIONAL TESTS:

## 2025-04-02 NOTE — PROGRESS NOTE ADULT - NS ATTEND AMEND GEN_ALL_CORE FT
Pt is a 39M with MHx obesity (Class III, BMI 69), pAfib (no AC), HTN, BEA (dz'ed 2019, AHI 34 non-compliant on CPAP set at 10), and history of b/l papilledema attributed to pseudotumor cerebri initially presenting as a transfer [facility] on 2/26 for acute hypoxemic respiratory failure s/p ETT intubation/MV with progression to refractory ARDS s/p initiation of vv-ECMO support (2/26-3/7) with failure to wean from ventilator s/p tracheostomy placement, further found to have RV failure s/p aggressive diuresis and PNA followed by severe sepsis 2/2 panniculitis c/b candida albicans fungemia now transferred to [facility]CU for further medical management.     Pt now awake and alert, spontaneously communicative and at mental status baseline. Continues to have severe functional debility likely 2/2 prolonged critical illness polyneuropathy but is progressing well. Now able to transfer via [place] to chair daily and is independent of UE and fine motor function. Off all sedation. Weaning of catapres initiated 2/25 (decreased to 0.2mg TID.) Weaning pain control, pt remains dependent on Dilaudid prn for LE neuropathic pain. PO regimen added and gabapentin increased without benefit, increased again today to 600mg qhs. Pain mgmt team consulted, recs appreciated. Podiatry consulted, possible worsening of right 5th digit that may require podiatric sx. Will CTM for now. No evidence of active infectious process in feet.    Pt with acute combined hypoxemic and hypercapnic respiratory failure with failure to wean from ventilator s/p tracheostomy placement. Pt was tolerating TC QD, PSV (18/10) qhs. Airway clearance therapy in place. Wean O2 supplementation for goal O2 saturation 90-95%. Aspiration precautions and oral hygiene in place. Decannulated at bedside 3/28, continues to use Bilevel NIV qhs. Serial ABGs wnl.     Pt further presenting with acute on chronic hypercapnia and will require Bilevel after discharge for chronic use 2/2 chronic hypercapnic respiratory failure from hypoventilation and obesity hypoventilation syndrome (BMI 69.) Pt has exhibited an inability to maintain appropriate ventilation with persistent hypercapnia without Bilevel support qhs. Pt has high CO2 levels (PaCO2 on ABG >52.) PaCO2 done immediately upon awakening while breathing his prescribed FiO2 shows the patient's PaCO2 worsened >7mmHg compared to initial ABG. Patient's OHS requires ventilatory support. Due to the patient's severe disease state and severe dyspnea, Bilevel is necessary. Due to the patient's decreased ventilatory drive and high CO2 levels, without Bilevel support patient will experience rapid clinical deterioration, which will lead to serious medical harm. Acute on chronic hypercapnic respiratory failure due to patient (BMI 69) obesity, which is causing restriction of the thoracic cage not allowing for proper gas exchange.     Pt initially p/w RV failure 2/2 pulmHTN now s/p aggressive diuresis with improvement. Now transitioned to PO diuretics with goal to maintain euvolemia. Acetazolamide added today. AFib well controlled conservatively, will continue to monitor on telemetry. TTE 2/25 with new RVE, 3/11 with RVSD. DVT noted on repeat duplex of the right IJ and bilateral peroneal veins. Will c/w full A/C on Eliquis for DVT and AFib.     Pt with oropharyngeal dysphagia s/p PEG placement (3/7) now transitioned to PO diet (3/20) via FEEST. Will continue to monitor on regular PO diet. Bowel regimen in place prn. PPI ppx. Transaminitis improving.     Pt further found to have severe sepsis 2/ candida albicans fungemia possibly from panniculitis (3/15, cleared 3/18) now on caspofungin with plan to complete 14 day course from clearance. TTE (-) endocarditis. Wound care team following. ID recs appreciated. Further found to have worsening sacral ulceration with induration, CT A/P and MRI spine performed without evidence of underlying tract or drainable fluid collection. Now with acute onset fevers, although without new s/s or complaints and with unclear source. Cx resent, empiric abx initiated with improvement. Will CTM closely and f/u repeat cx. Plan for completion of 7 day course.     Dispo pending medical optimization. Pt full code. DVT ppx in place. Palliative consulted, recs appreciated. Will continue to update family and discuss plan of care throughout hospitalization. Dispo planning to acute rehab initiated.

## 2025-04-02 NOTE — PROGRESS NOTE ADULT - SUBJECTIVE AND OBJECTIVE BOX
Patient is a 39y old  Male who presents with a chief complaint of sob (02 Mar 2025 06:19)      HPI:  38 yo M w/ class III obesity, pAfib (no AC), HTN, BEA (on CPAP), history of b/l papilledema attributed to pseudotumor cerebri s/p LP in 2024 with very high opening pressure, who is transferred for VV ECMO for ARDS and severe hypoxia. Patient initially presented to [place] on 2/24 for SOB and b/l LE edema. 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 He is a current every day smoker of Newports and marijuana, but does not drink or do other drugs. Currently lives with his mother. There is a long term partner in his life, but they are not , and they have an on/off relationship currently not very involved with each other as per the patient's mother and aunt.  At [facility] where he was getting an eval last year for shortness of breath, he had a sleep study and was diagnosed with sleep apnea, prescribed a PAP machine, which he has in his room but the mother is not sure how many nights per week he uses it. Recently, the last day or two, his mother and brother have been under the weather with URIs and the patient 2 days ago started to develop a ruynny nose, ough, some wheezing of the chest, as well as worsening shortness of breath and fevers at home. He called his aunt who told him to go get checked out and then he landed at the [place] ED. Patient found to be reportedly hypoxemic to 70% on RA with worsening respiratory status/secretions and somnolence in the ED. Patient was intubated with difficulty (7 attempts) due to very large tongue secretions. Despite optimal vent management, patient remained hypoxemic. Unable to prone due to obesity. Patient cannulated for VV ECMO on 2/25 and transferred to [facility] for further management.     [place] labs notable for MRSA PCR -, Fluvid -, urine legionella -, sputum gram stain  with GPC and GPR    CTA chest on 2/24 negative for pulmonary embolism, notable for patchy bilateral lower lobe opacities, hepatomegaly, mild ascites, edema/induration of the abdominal pannus.    LE duplex on 2/25 negative for DVT    TTE on 2/25 showed LV EF 61%, enlarged RV with TAPSE 2.1cm, ePASP at least 49 (Patient had 10/2024 TTE with normal LV and RV with ePASP 14).    Only home med is Lasix. Not on acetazolamide for pseudotumor or on Wegovy (was pending auth) (26 Feb 2025 01:48)      REVIEW OF SYSTEMS  Constitutional - No fever, No weight loss, No fatigue  HEENT - No eye pain, No visual disturbances, No difficulty hearing, No tinnitus, No vertigo, No neck pain  Respiratory - No cough, No wheezing, No shortness of breath  Cardiovascular - No chest pain, No palpitations  Gastrointestinal - No abdominal pain, No nausea, No vomiting, No diarrhea, No constipation  Genitourinary - No dysuria, No frequency, No hematuria, No incontinence  Neurological - No headaches, No memory loss, + loss of strength, No numbness, No tremors  Skin - No itching, No rashes, No lesions   Endocrine - No temperature intolerance  Musculoskeletal - No joint pain, No joint swelling, + pain  Psychiatric - No depression, No anxiety    PAST MEDICAL & SURGICAL HISTORY  HTN (hypertension)    Atrial fibrillation    Papilledema of both eyes    Chronic sinusitis    Morbid obesity    No significant past surgical history       CURRENT FUNCTIONAL STATUS  4/1   Progress Summary: Chart reviewed and content noted. Pt recieved semi-supine in bed with all lines intact, NAD. Pt required moderate assistx2 for long sitting in bed, attempted to sit up on edge of bed but unable due to patient having difficulty trying to scoot in bed. Pt left semi-supine in bed with all lines intact, call button within reach, NAD. Pt responded to treatment well. JAMILA La made aware of above, PT to follow.            RECENT LABS/IMAGING  CBC Full  -  ( 02 Apr 2025 04:56 )  WBC Count : 13.79 K/uL  RBC Count : 2.46 M/uL  Hemoglobin : 8.0 g/dL  Hematocrit : 22.1 %  Platelet Count - Automated : 371 K/uL  Mean Cell Volume : 89.8 fL  Mean Cell Hemoglobin : 32.5 pg  Mean Cell Hemoglobin Concentration : 36.2 g/dL  Auto Neutrophil # : x  Auto Lymphocyte # : x  Auto Monocyte # : x  Auto Eosinophil # : x  Auto Basophil # : x  Auto Neutrophil % : x  Auto Lymphocyte % : x  Auto Monocyte % : x  Auto Eosinophil % : x  Auto Basophil % : x    04-02    134[L]  |  90[L]  |  6[L]  ----------------------------<  99  3.6   |  29  |  0.83    Ca    8.8      02 Apr 2025 04:56  Phos  4.8     04-02  Mg     1.40     04-02      Urinalysis Basic - ( 02 Apr 2025 04:56 )    Color: x / Appearance: x / SG: x / pH: x  Gluc: 99 mg/dL / Ketone: x  / Bili: x / Urobili: x   Blood: x / Protein: x / Nitrite: x   Leuk Esterase: x / RBC: x / WBC x   Sq Epi: x / Non Sq Epi: x / Bacteria: x        VITALS  T(C): 37.7 (04-02-25 @ 08:00), Max: 38.6 (04-01-25 @ 20:00)  HR: 112 (04-02-25 @ 09:25) (112 - 130)  BP: 124/81 (04-02-25 @ 08:00) (124/81 - 151/89)  RR: 20 (04-02-25 @ 08:00) (19 - 22)  SpO2: 98% (04-02-25 @ 08:00) (95% - 100%)  Wt(kg): --    ALLERGIES  No Known Allergies      MEDICATIONS   acetaminophen     Tablet .. 650 milliGRAM(s) Oral every 6 hours PRN  albuterol/ipratropium for Nebulization 3 milliLiter(s) Nebulizer every 6 hours  amLODIPine   Tablet 10 milliGRAM(s) Oral daily  apixaban 5 milliGRAM(s) Oral every 12 hours  caspofungin IVPB 70 milliGRAM(s) IV Intermittent every 24 hours  chlorhexidine 2% Cloths 1 Application(s) Topical <User Schedule>  cloNIDine 0.1 milliGRAM(s) Oral every 8 hours  clotrimazole 1% Cream 1 Application(s) Topical two times a day  Dakins Solution - 1/4 Strength 1 Application(s) Topical two times a day  dextrose 50% Injectable 25 Gram(s) IV Push once  dextrose 50% Injectable 12.5 Gram(s) IV Push once  dextrose 50% Injectable 25 Gram(s) IV Push once  dextrose Oral Gel 15 Gram(s) Oral once PRN  furosemide    Tablet 40 milliGRAM(s) Oral two times a day  gabapentin 600 milliGRAM(s) Oral every 8 hours  glucagon  Injectable 1 milliGRAM(s) IntraMuscular once  HYDROmorphone  Injectable 0.5 milliGRAM(s) IV Push every 6 hours PRN  insulin lispro (ADMELOG) corrective regimen sliding scale   SubCutaneous three times a day before meals  insulin lispro (ADMELOG) corrective regimen sliding scale   SubCutaneous at bedtime  lidocaine   4% Patch 1 Patch Transdermal daily  lidocaine   4% Patch 1 Patch Transdermal daily  magnesium sulfate  IVPB 2 Gram(s) IV Intermittent once  melatonin 3 milliGRAM(s) Oral at bedtime  multivitamin 1 Tablet(s) Oral daily  mupirocin 2% Ointment 1 Application(s) Topical two times a day  oxyCODONE    IR 10 milliGRAM(s) Oral every 4 hours PRN  pantoprazole    Tablet 40 milliGRAM(s) Oral before breakfast  piperacillin/tazobactam IVPB.. 3.375 Gram(s) IV Intermittent every 8 hours  polyethylene glycol 3350 17 Gram(s) Oral daily  QUEtiapine 25 milliGRAM(s) Oral at bedtime  senna 2 Tablet(s) Oral at bedtime  vancomycin  IVPB 1250 milliGRAM(s) IV Intermittent every 8 hours      ----------------------------------------------------------------------------------------   PHYSICAL EXAM  Constitutional - NAD in chair  Chest - no respiratory distress  Cardiovascular -mild tachy  Abdomen - +PEG  Extremities - dressings b/l feet  Neurologic Exam -                    Cognitive - Awake, Alert, AAO to self, place, date, year, situation      Communication - fluent     Cranial Nerves - CN 2-12 intact     Motor -                     LEFT    UE - 4+/5                    RIGHT UE - 4+/5                    LEFT    LE -  2+/5 however dorsiflexion 0/5                    RIGHT LE -  2-/4     Sensory - Intact to LT       Balance - WNL Static  Psychiatric - Mood stable, Affect WNL  ----------------------------------------------------------------------------------------  ASSESSMENT/PLAN  38 yo m PMHx of morbid obesity, BEA on CPAP, pAFIB (not on AC), HTN, and BL papilledema attributed to pseudotumor cerebri who presented initially to [facility] on 2/24 with SOB and BL LE edema. Found with URI outpatient with progressive SOB, and concern for noted for MFPNA on imaging. Course progressed with AHRF with worsening O2 demand, respiratory distress, secretions, and somnolence requiring intubation (difficult with success after 6 attempts) in ED and admission to [facility] MICU. While in MICU, patient noted with increasing O2 demand with no improvement despite optimal vent management. Concern noted for progressive ARDS, unable to prone given morbid obesity, and ultimately cannulated for vvECMO on 2/25 and transferred to [facility] for further management on 2/26.   transferred to [facility] 3/11, course complicated by fever. afebrile past 48 hours  s/p trach and peg 3/7   continue bedside PT and OT     Pain - acetaminphen, gabapentin 800mg q8, consider pregabalin if pain not improving  DVT PPX - on eliquis  Rehab -  tolerated bedside therapy, goal is for acute rehab when medically cleared.  Patient can tolerate 3 hours per day of therapy with medical supervision      Total time spent to review relevant records and imaging results, examine patient, complete documentation, and when applicable discuss the case with the patient, family, , social workers, and medical team: 35  minutes

## 2025-04-02 NOTE — PROGRESS NOTE ADULT - SUBJECTIVE AND OBJECTIVE BOX
Infectious Diseases Follow Up:    Patient is a 39y old  Male who presents with a chief complaint of sob (02 Mar 2025 06:19)      Interval History/ROS:  Febrile to 101 yesterday evening, pt w/ b/l feet pain   WBC improving     Allergies  No Known Allergies        ANTIMICROBIALS:  caspofungin IVPB 70 every 24 hours  piperacillin/tazobactam IVPB.. 3.375 every 8 hours  vancomycin  IVPB 1250 every 8 hours      Current Abx:     Previous Abx     OTHER MEDS:  MEDICATIONS  (STANDING):  acetaminophen     Tablet .. 650 every 6 hours PRN  albuterol/ipratropium for Nebulization 3 every 6 hours  amLODIPine   Tablet 10 daily  apixaban 5 every 12 hours  cloNIDine 0.1 every 8 hours  dextrose 50% Injectable 25 once  dextrose 50% Injectable 12.5 once  dextrose 50% Injectable 25 once  dextrose Oral Gel 15 once PRN  furosemide    Tablet 40 two times a day  gabapentin 600 every 8 hours  glucagon  Injectable 1 once  HYDROmorphone  Injectable 0.5 every 6 hours PRN  insulin lispro (ADMELOG) corrective regimen sliding scale  three times a day before meals  insulin lispro (ADMELOG) corrective regimen sliding scale  at bedtime  melatonin 3 at bedtime  oxyCODONE    IR 10 every 4 hours PRN  pantoprazole    Tablet 40 before breakfast  polyethylene glycol 3350 17 daily  QUEtiapine 25 at bedtime  senna 2 at bedtime      Vital Signs Last 24 Hrs  T(C): 37.7 (02 Apr 2025 08:00), Max: 38.6 (01 Apr 2025 20:00)  T(F): 99.8 (02 Apr 2025 08:00), Max: 101.5 (01 Apr 2025 20:00)  HR: 112 (02 Apr 2025 09:25) (112 - 130)  BP: 124/81 (02 Apr 2025 08:00) (124/81 - 151/89)  BP(mean): --  RR: 20 (02 Apr 2025 08:00) (19 - 22)  SpO2: 98% (02 Apr 2025 08:00) (95% - 100%)    Parameters below as of 02 Apr 2025 08:00  Patient On (Oxygen Delivery Method): nasal cannula          PHYSICAL EXAM:  GENERAL: NAD  HEAD:  Atraumatic, Normocephalic  EYES: EOMI, conjunctiva and sclera clear  CHEST/LUNG: On NC, CTAB  HEART: RRR  ABDOMEN: Soft, Nontender, Nondistended;  Extremities: B/l feet wrapped   PSYCH: AAOx3                          8.0    13.79 )-----------( 371      ( 02 Apr 2025 04:56 )             22.1       04-02    134[L]  |  90[L]  |  6[L]  ----------------------------<  99  3.6   |  29  |  0.83    Ca    8.8      02 Apr 2025 04:56  Phos  4.8     04-02  Mg     1.40     04-02        Urinalysis Basic - ( 02 Apr 2025 04:56 )    Color: x / Appearance: x / SG: x / pH: x  Gluc: 99 mg/dL / Ketone: x  / Bili: x / Urobili: x   Blood: x / Protein: x / Nitrite: x   Leuk Esterase: x / RBC: x / WBC x   Sq Epi: x / Non Sq Epi: x / Bacteria: x        MICROBIOLOGY:  v  Clean Catch Clean Catch (Midstream)  03-31-25   <10,000 CFU/mL Normal Urogenital Kyle  --  --      Blood Blood-Peripheral  03-30-25   No growth at 48 Hours  --  --      Trach Asp Tracheal Aspirate  03-21-25   Commensal kyle consistent with body site  --    Few polymorphonuclear leukocytes per low power field  No Squamous epithelial cells per low power field  No organisms seen per oil power field      Blood Blood-Peripheral  03-20-25   No growth at 5 days  --  --      Blood Blood-Venous  03-20-25   No growth at 5 days  --  --      Blood Blood-Peripheral  03-18-25   No growth at 5 days  --  Blood Culture PCR  Candida albicans      Nares/Axilla/Groin Nares/Axilla/Groin  03-17-25   Culture NEGATIVE for Candida auris.  This surveillance culture is intended for Infection Control purposes only.  A negative result does not preclude the carriage of other fungal  organisms.  --  --      Blood Blood-Venous  03-16-25   Growth in aerobic bottle: Candida albicans  See previous culture [id number]  --    Growth in aerobic bottle: Yeast like cells      Blood Blood-Peripheral  03-16-25   Growth in aerobic bottle: Candida albicans  See previous culture [id number]  --    Growth in aerobic bottle: Yeast like cells      Trach Asp Tracheal Aspirate  03-16-25   Commensal kyle consistent with body site  --    Few polymorphonuclear leukocytes per low power field  Rare Squamous epithelial cells per low power field  Rare Gram Positive Rods seen per oil power field  Rare Gram Negative Rods seen per oil power field      Blood Blood-Venous  03-15-25   Growth in aerobic bottle: Candida albicans  See previous culture [id number]  --    Growth in aerobic bottle: Yeast like cells      Blood Blood-Peripheral  03-15-25   Growth in aerobic bottle: Candida albicans  Direct identification is available within approximately 3-5  hours either by Blood Panel Multiplexed PCR or Direct  MALDI-TOF. Details: [email or web address]  --  Blood Culture PCR  Candida albicans      Catheterized Catheterized  03-12-25   <10,000 CFU/mL Normal Urogenital Kyle  --  --      Blood Blood-Peripheral  03-11-25   No growth at 5 days  --  --      Blood Blood-Peripheral  03-11-25   No growth at 5 days  --  --      Trach Asp Tracheal Aspirate  03-10-25   Commensal kyle consistent with body site  --    Few polymorphonuclear leukocytes per low power field  No Squamous epithelial cells per low power field  No organisms seen per oil power field      Bronchial Bronchial Lavage  03-05-25   No growth  --    Rare polymorphonuclear leukocytes per low power field  No squamous epithelial cells per low power field  No organisms seen per oil power field                RADIOLOGY:

## 2025-04-03 PROBLEM — J18.9 MULTIFOCAL PNEUMONIA: Status: ACTIVE | Noted: 2025-04-03

## 2025-04-03 PROBLEM — Z93.0 STATUS POST TRACHEOSTOMY: Status: ACTIVE | Noted: 2025-04-03

## 2025-04-03 PROBLEM — J80 ARDS (ADULT RESPIRATORY DISTRESS SYNDROME): Status: ACTIVE | Noted: 2025-04-03

## 2025-04-03 PROBLEM — Z92.81 HISTORY OF EXTRACORPOREAL MEMBRANE OXYGENATION: Status: ACTIVE | Noted: 2025-04-03

## 2025-04-03 PROBLEM — B49 FUNGEMIA: Status: ACTIVE | Noted: 2025-04-03

## 2025-04-03 PROBLEM — A48.0 GAS GANGRENE OF FOOT: Status: ACTIVE | Noted: 2025-04-03

## 2025-04-03 LAB
ANION GAP SERPL CALC-SCNC: 17 MMOL/L — HIGH (ref 7–14)
BASOPHILS # BLD AUTO: 0.02 K/UL — SIGNIFICANT CHANGE UP (ref 0–0.2)
BASOPHILS NFR BLD AUTO: 0.1 % — SIGNIFICANT CHANGE UP (ref 0–2)
BUN SERPL-MCNC: 6 MG/DL — LOW (ref 7–23)
CALCIUM SERPL-MCNC: 8.5 MG/DL — SIGNIFICANT CHANGE UP (ref 8.4–10.5)
CHLORIDE SERPL-SCNC: 88 MMOL/L — LOW (ref 98–107)
CO2 SERPL-SCNC: 28 MMOL/L — SIGNIFICANT CHANGE UP (ref 22–31)
CREAT SERPL-MCNC: 0.74 MG/DL — SIGNIFICANT CHANGE UP (ref 0.5–1.3)
EGFR: 120 ML/MIN/1.73M2 — SIGNIFICANT CHANGE UP
EGFR: 120 ML/MIN/1.73M2 — SIGNIFICANT CHANGE UP
EOSINOPHIL # BLD AUTO: 0.05 K/UL — SIGNIFICANT CHANGE UP (ref 0–0.5)
EOSINOPHIL NFR BLD AUTO: 0.3 % — SIGNIFICANT CHANGE UP (ref 0–6)
GLUCOSE BLDC GLUCOMTR-MCNC: 106 MG/DL — HIGH (ref 70–99)
GLUCOSE BLDC GLUCOMTR-MCNC: 111 MG/DL — HIGH (ref 70–99)
GLUCOSE BLDC GLUCOMTR-MCNC: 114 MG/DL — HIGH (ref 70–99)
GLUCOSE BLDC GLUCOMTR-MCNC: 119 MG/DL — HIGH (ref 70–99)
GLUCOSE SERPL-MCNC: 105 MG/DL — HIGH (ref 70–99)
HCT VFR BLD CALC: 24.1 % — LOW (ref 39–50)
HGB BLD-MCNC: 7.8 G/DL — LOW (ref 13–17)
IANC: 13.03 K/UL — HIGH (ref 1.8–7.4)
IMM GRANULOCYTES NFR BLD AUTO: 1.2 % — HIGH (ref 0–0.9)
LYMPHOCYTES # BLD AUTO: 12.6 % — LOW (ref 13–44)
LYMPHOCYTES # BLD AUTO: 2.12 K/UL — SIGNIFICANT CHANGE UP (ref 1–3.3)
MAGNESIUM SERPL-MCNC: 1.7 MG/DL — SIGNIFICANT CHANGE UP (ref 1.6–2.6)
MCHC RBC-ENTMCNC: 27.9 PG — SIGNIFICANT CHANGE UP (ref 27–34)
MCHC RBC-ENTMCNC: 32.4 G/DL — SIGNIFICANT CHANGE UP (ref 32–36)
MCV RBC AUTO: 86.1 FL — SIGNIFICANT CHANGE UP (ref 80–100)
MONOCYTES # BLD AUTO: 1.36 K/UL — HIGH (ref 0–0.9)
MONOCYTES NFR BLD AUTO: 8.1 % — SIGNIFICANT CHANGE UP (ref 2–14)
NEUTROPHILS # BLD AUTO: 13.03 K/UL — HIGH (ref 1.8–7.4)
NEUTROPHILS NFR BLD AUTO: 77.7 % — HIGH (ref 43–77)
NRBC # BLD AUTO: 0 K/UL — SIGNIFICANT CHANGE UP (ref 0–0)
NRBC # FLD: 0 K/UL — SIGNIFICANT CHANGE UP (ref 0–0)
NRBC BLD AUTO-RTO: 0 /100 WBCS — SIGNIFICANT CHANGE UP (ref 0–0)
PHOSPHATE SERPL-MCNC: 4.2 MG/DL — SIGNIFICANT CHANGE UP (ref 2.5–4.5)
PLATELET # BLD AUTO: 365 K/UL — SIGNIFICANT CHANGE UP (ref 150–400)
POTASSIUM SERPL-MCNC: 3.4 MMOL/L — LOW (ref 3.5–5.3)
POTASSIUM SERPL-SCNC: 3.4 MMOL/L — LOW (ref 3.5–5.3)
RBC # BLD: 2.8 M/UL — LOW (ref 4.2–5.8)
RBC # FLD: 22.3 % — HIGH (ref 10.3–14.5)
SODIUM SERPL-SCNC: 133 MMOL/L — LOW (ref 135–145)
VANCOMYCIN TROUGH SERPL-MCNC: 16.1 UG/ML — SIGNIFICANT CHANGE UP (ref 10–20)
WBC # BLD: 16.78 K/UL — HIGH (ref 3.8–10.5)
WBC # FLD AUTO: 16.78 K/UL — HIGH (ref 3.8–10.5)

## 2025-04-03 PROCEDURE — 99233 SBSQ HOSP IP/OBS HIGH 50: CPT

## 2025-04-03 PROCEDURE — 74177 CT ABD & PELVIS W/CONTRAST: CPT | Mod: 26

## 2025-04-03 PROCEDURE — 71260 CT THORAX DX C+: CPT | Mod: 26

## 2025-04-03 RX ORDER — HYDROMORPHONE/SOD CHLOR,ISO/PF 2 MG/10 ML
0.5 SYRINGE (ML) INJECTION ONCE
Refills: 0 | Status: DISCONTINUED | OUTPATIENT
Start: 2025-04-03 | End: 2025-04-04

## 2025-04-03 RX ORDER — HYDROMORPHONE/SOD CHLOR,ISO/PF 2 MG/10 ML
0.5 SYRINGE (ML) INJECTION DAILY
Refills: 0 | Status: DISCONTINUED | OUTPATIENT
Start: 2025-04-03 | End: 2025-04-05

## 2025-04-03 RX ORDER — HYDROMORPHONE/SOD CHLOR,ISO/PF 2 MG/10 ML
2 SYRINGE (ML) INJECTION EVERY 6 HOURS
Refills: 0 | Status: DISCONTINUED | OUTPATIENT
Start: 2025-04-03 | End: 2025-04-04

## 2025-04-03 RX ORDER — IPRATROPIUM BROMIDE AND ALBUTEROL SULFATE .5; 2.5 MG/3ML; MG/3ML
3 SOLUTION RESPIRATORY (INHALATION) EVERY 6 HOURS
Refills: 0 | Status: DISCONTINUED | OUTPATIENT
Start: 2025-04-03 | End: 2025-04-25

## 2025-04-03 RX ORDER — MAGNESIUM SULFATE 500 MG/ML
1 SYRINGE (ML) INJECTION ONCE
Refills: 0 | Status: COMPLETED | OUTPATIENT
Start: 2025-04-03 | End: 2025-04-03

## 2025-04-03 RX ORDER — ACETAMINOPHEN 500 MG/5ML
1000 LIQUID (ML) ORAL ONCE
Refills: 0 | Status: COMPLETED | OUTPATIENT
Start: 2025-04-03 | End: 2025-04-03

## 2025-04-03 RX ADMIN — Medication 40 MILLIGRAM(S): at 06:00

## 2025-04-03 RX ADMIN — Medication 3 MILLIGRAM(S): at 22:18

## 2025-04-03 RX ADMIN — GABAPENTIN 600 MILLIGRAM(S): 400 CAPSULE ORAL at 05:50

## 2025-04-03 RX ADMIN — Medication 2 MILLIGRAM(S): at 11:47

## 2025-04-03 RX ADMIN — Medication 2 MILLIGRAM(S): at 10:47

## 2025-04-03 RX ADMIN — Medication 2 TABLET(S): at 22:17

## 2025-04-03 RX ADMIN — MUPIROCIN CALCIUM 1 APPLICATION(S): 20 CREAM TOPICAL at 05:51

## 2025-04-03 RX ADMIN — Medication 25 GRAM(S): at 16:03

## 2025-04-03 RX ADMIN — APIXABAN 5 MILLIGRAM(S): 2.5 TABLET, FILM COATED ORAL at 05:51

## 2025-04-03 RX ADMIN — Medication 25 GRAM(S): at 23:26

## 2025-04-03 RX ADMIN — Medication 2 MILLIGRAM(S): at 02:25

## 2025-04-03 RX ADMIN — GABAPENTIN 600 MILLIGRAM(S): 400 CAPSULE ORAL at 16:03

## 2025-04-03 RX ADMIN — CLOTRIMAZOLE 1 APPLICATION(S): 1 CREAM TOPICAL at 17:26

## 2025-04-03 RX ADMIN — Medication 100 GRAM(S): at 16:04

## 2025-04-03 RX ADMIN — GABAPENTIN 800 MILLIGRAM(S): 400 CAPSULE ORAL at 22:17

## 2025-04-03 RX ADMIN — FUROSEMIDE 40 MILLIGRAM(S): 10 INJECTION INTRAMUSCULAR; INTRAVENOUS at 05:50

## 2025-04-03 RX ADMIN — Medication 166.67 MILLIGRAM(S): at 05:47

## 2025-04-03 RX ADMIN — APIXABAN 5 MILLIGRAM(S): 2.5 TABLET, FILM COATED ORAL at 17:01

## 2025-04-03 RX ADMIN — Medication 166.67 MILLIGRAM(S): at 17:03

## 2025-04-03 RX ADMIN — SODIUM HYPOCHLORITE 1 APPLICATION(S): 0.12 SOLUTION TOPICAL at 05:51

## 2025-04-03 RX ADMIN — QUETIAPINE FUMARATE 25 MILLIGRAM(S): 25 TABLET ORAL at 22:17

## 2025-04-03 RX ADMIN — Medication 650 MILLIGRAM(S): at 02:26

## 2025-04-03 RX ADMIN — Medication 0.5 MILLIGRAM(S): at 15:20

## 2025-04-03 RX ADMIN — SODIUM HYPOCHLORITE 1 APPLICATION(S): 0.12 SOLUTION TOPICAL at 17:27

## 2025-04-03 RX ADMIN — Medication 2 MILLIGRAM(S): at 18:01

## 2025-04-03 RX ADMIN — Medication 1 APPLICATION(S): at 05:47

## 2025-04-03 RX ADMIN — Medication 40 MILLIEQUIVALENT(S): at 16:03

## 2025-04-03 RX ADMIN — AMLODIPINE BESYLATE 10 MILLIGRAM(S): 10 TABLET ORAL at 05:51

## 2025-04-03 RX ADMIN — CLOTRIMAZOLE 1 APPLICATION(S): 1 CREAM TOPICAL at 05:52

## 2025-04-03 RX ADMIN — FUROSEMIDE 40 MILLIGRAM(S): 10 INJECTION INTRAMUSCULAR; INTRAVENOUS at 16:04

## 2025-04-03 RX ADMIN — Medication 1 TABLET(S): at 12:32

## 2025-04-03 RX ADMIN — Medication 0.5 MILLIGRAM(S): at 04:16

## 2025-04-03 RX ADMIN — IPRATROPIUM BROMIDE AND ALBUTEROL SULFATE 3 MILLILITER(S): .5; 2.5 SOLUTION RESPIRATORY (INHALATION) at 08:10

## 2025-04-03 RX ADMIN — Medication 2 MILLIGRAM(S): at 17:01

## 2025-04-03 RX ADMIN — Medication 25 GRAM(S): at 05:47

## 2025-04-03 RX ADMIN — CASPOFUNGIN ACETATE 260 MILLIGRAM(S): 5 INJECTION, POWDER, LYOPHILIZED, FOR SOLUTION INTRAVENOUS at 04:15

## 2025-04-03 RX ADMIN — MUPIROCIN CALCIUM 1 APPLICATION(S): 20 CREAM TOPICAL at 17:01

## 2025-04-03 RX ADMIN — IPRATROPIUM BROMIDE AND ALBUTEROL SULFATE 3 MILLILITER(S): .5; 2.5 SOLUTION RESPIRATORY (INHALATION) at 03:22

## 2025-04-03 RX ADMIN — Medication 0.1 MILLIGRAM(S): at 05:50

## 2025-04-03 RX ADMIN — Medication 0.5 MILLIGRAM(S): at 14:20

## 2025-04-03 NOTE — PROGRESS NOTE ADULT - SUBJECTIVE AND OBJECTIVE BOX
Incomplete full note to follow.  Sacral Wound without obvious s/s of acute soft tissue infection, tissue type responding to topical Dakins 1/4 strength,  however deep tunnel within wound base at 11 o'clock. Unable to palpate bone. Concern that due to body habitus tunnel may in fact extend to bone, although I am unable to palpate at this time. No purulent drainage, no odor. no increased tenderness reported by patient. No culture obtained as there were no identified areas of purulence and significant necrosis remains.   Recommendations made to continue current topical recommendations and concern MRI with extended cuts including sacrum and bilateral buttocks.  Discussed with primary RCU team.    [facility]-- WOUND TEAM -- FOLLOW UP NOTE  --------------------------------------------------------------------------------    subjective:    Interval HPI/24 hour events:     Chart reviewed including labs and relevant images      Diet:      ROS: General/ SKIN/ MSK/ Neuro/ GI see HPI  all other systems negative  pt unable to offer    ALLERGIES & MEDICATIONS  --------------------------------------------------------------------------------  Allergies    No Known Allergies    Intolerances          STANDING INPATIENT MEDICATIONS    amLODIPine   Tablet 10 milliGRAM(s) Oral daily  apixaban 5 milliGRAM(s) Oral every 12 hours  caspofungin IVPB 70 milliGRAM(s) IV Intermittent every 24 hours  chlorhexidine 2% Cloths 1 Application(s) Topical <User Schedule>  clotrimazole 1% Cream 1 Application(s) Topical two times a day  Dakins Solution - 1/4 Strength 1 Application(s) Topical two times a day  dextrose 50% Injectable 25 Gram(s) IV Push once  dextrose 50% Injectable 12.5 Gram(s) IV Push once  dextrose 50% Injectable 25 Gram(s) IV Push once  furosemide    Tablet 40 milliGRAM(s) Oral two times a day  gabapentin 600 milliGRAM(s) Oral <User Schedule>  gabapentin 800 milliGRAM(s) Oral <User Schedule>  glucagon  Injectable 1 milliGRAM(s) IntraMuscular once  insulin lispro (ADMELOG) corrective regimen sliding scale   SubCutaneous three times a day before meals  insulin lispro (ADMELOG) corrective regimen sliding scale   SubCutaneous at bedtime  lidocaine   4% Patch 1 Patch Transdermal daily  lidocaine   4% Patch 1 Patch Transdermal daily  magnesium sulfate  IVPB 1 Gram(s) IV Intermittent once  melatonin 3 milliGRAM(s) Oral at bedtime  multivitamin 1 Tablet(s) Oral daily  mupirocin 2% Ointment 1 Application(s) Topical two times a day  pantoprazole    Tablet 40 milliGRAM(s) Oral before breakfast  piperacillin/tazobactam IVPB.. 3.375 Gram(s) IV Intermittent every 8 hours  polyethylene glycol 3350 17 Gram(s) Oral daily  potassium chloride   Powder 40 milliEquivalent(s) Oral once  QUEtiapine 25 milliGRAM(s) Oral at bedtime  senna 2 Tablet(s) Oral at bedtime  vancomycin  IVPB 1250 milliGRAM(s) IV Intermittent every 8 hours      PRN INPATIENT MEDICATION  acetaminophen     Tablet .. 650 milliGRAM(s) Oral every 6 hours PRN  albuterol/ipratropium for Nebulization 3 milliLiter(s) Nebulizer every 6 hours PRN  dextrose Oral Gel 15 Gram(s) Oral once PRN  HYDROmorphone   Tablet 2 milliGRAM(s) Oral every 6 hours PRN  HYDROmorphone  Injectable 0.5 milliGRAM(s) IV Push daily PRN        Vital signs:  T(C): 37.7 (04-03-25 @ 09:00), Max: 38.2 (04-03-25 @ 02:20)  HR: 109 (04-03-25 @ 11:16) (105 - 125)  BP: 124/86 (04-03-25 @ 09:00) (124/86 - 146/78)  RR: 18 (04-03-25 @ 09:00) (18 - 18)  SpO2: 98% (04-03-25 @ 11:16) (98% - 100%)  Wt(kg): --        04-02-25 @ 07:01  -  04-03-25 @ 07:00  --------------------------------------------------------  IN: 0 mL / OUT: 5400 mL / NET: -5400 mL        Physical exam:  General: NAD, A & O x 3, lethargic. Cachetic, temporal wasting, prominent bony prominences. Obese. Funtional quadrapalegic  HEENT: NC/NT, mucosa moist/dry. Poor dentation.  GI: Soft, NT/ND. + BS. Fecal incontinence.  : Indwelling baires catheter, condom cathete, penile pouch  Vascular: No temperature changes noted. Increased coolness from midfoot to distal toes. + (  ) DP/PT pulses. Capillary refill < 3 seconds. + Edema bilaterally. Thickened toenails. (+) hemosiderin staining.  Skin: moist, adequate skin turgor. Dry, poor skin turgor. Frail.  Psych: mood and demeanor appropriate      LABS/ CULTURES/ RADIOLOGY:              7.8    16.78 >-----------<  365      [04-03-25 @ 04:23]              24.1     133  |  88  |  6   ----------------------------<  105      [04-03-25 @ 04:23]  3.4   |  28  |  0.74        Ca     8.5     [04-03-25 @ 04:23]      Mg     1.70     [04-03-25 @ 04:23]      Phos  4.2     [04-03-25 @ 04:23]                CAPILLARY BLOOD GLUCOSE      POCT Blood Glucose.: 119 mg/dL (03 Apr 2025 12:26)  POCT Blood Glucose.: 114 mg/dL (03 Apr 2025 08:10)  POCT Blood Glucose.: 118 mg/dL (02 Apr 2025 21:41)  POCT Blood Glucose.: 117 mg/dL (02 Apr 2025 17:03)                  Culture - Blood (collected 03-30-25 @ 23:52)  Source: Blood Blood-Peripheral  Preliminary Report (04-03-25 @ 10:01):    No growth at 72 Hours          A1C with Estimated Average Glucose Result: 6.7 % (02-25-25 @ 05:44)       [facility]-- WOUND TEAM -- FOLLOW UP NOTE  --------------------------------------------------------------------------------    subjective: Patient seen and examined at bedside. Denies pain and/or tenderness to sacrum and/or buttocks. Reports burning pain to bilateral feet. Denies CP, SOB, abdominal pain, n/v.     Interval HPI/24 hour events:   -Intermittent fevers, WBC uptrending  -4/2 Seen by Podiatry- b/l foot wounds without s/s of acute infection   -ID following, patient remains on Caspofungin, Vanc/Zosyn.    Chart reviewed including labs and relevant images      Diet:  Diet, Soft and Bite Sized:   Supplement Feeding Modality:  Oral  Ensure Max Cans or Servings Per Day:  1       Frequency:  Two Times a day (03-24-25 @ 16:03) [Active]      ROS: General, skin see above  All other systems negative.    ALLERGIES & MEDICATIONS  --------------------------------------------------------------------------------  Allergies    No Known Allergies    Intolerances          STANDING INPATIENT MEDICATIONS    amLODIPine   Tablet 10 milliGRAM(s) Oral daily  apixaban 5 milliGRAM(s) Oral every 12 hours  caspofungin IVPB 70 milliGRAM(s) IV Intermittent every 24 hours  chlorhexidine 2% Cloths 1 Application(s) Topical <User Schedule>  clotrimazole 1% Cream 1 Application(s) Topical two times a day  Dakins Solution - 1/4 Strength 1 Application(s) Topical two times a day  dextrose 50% Injectable 25 Gram(s) IV Push once  dextrose 50% Injectable 12.5 Gram(s) IV Push once  dextrose 50% Injectable 25 Gram(s) IV Push once  furosemide    Tablet 40 milliGRAM(s) Oral two times a day  gabapentin 600 milliGRAM(s) Oral <User Schedule>  gabapentin 800 milliGRAM(s) Oral <User Schedule>  glucagon  Injectable 1 milliGRAM(s) IntraMuscular once  insulin lispro (ADMELOG) corrective regimen sliding scale   SubCutaneous three times a day before meals  insulin lispro (ADMELOG) corrective regimen sliding scale   SubCutaneous at bedtime  lidocaine   4% Patch 1 Patch Transdermal daily  lidocaine   4% Patch 1 Patch Transdermal daily  magnesium sulfate  IVPB 1 Gram(s) IV Intermittent once  melatonin 3 milliGRAM(s) Oral at bedtime  multivitamin 1 Tablet(s) Oral daily  mupirocin 2% Ointment 1 Application(s) Topical two times a day  pantoprazole    Tablet 40 milliGRAM(s) Oral before breakfast  piperacillin/tazobactam IVPB.. 3.375 Gram(s) IV Intermittent every 8 hours  polyethylene glycol 3350 17 Gram(s) Oral daily  potassium chloride   Powder 40 milliEquivalent(s) Oral once  QUEtiapine 25 milliGRAM(s) Oral at bedtime  senna 2 Tablet(s) Oral at bedtime  vancomycin  IVPB 1250 milliGRAM(s) IV Intermittent every 8 hours      PRN INPATIENT MEDICATION  acetaminophen     Tablet .. 650 milliGRAM(s) Oral every 6 hours PRN  albuterol/ipratropium for Nebulization 3 milliLiter(s) Nebulizer every 6 hours PRN  dextrose Oral Gel 15 Gram(s) Oral once PRN  HYDROmorphone   Tablet 2 milliGRAM(s) Oral every 6 hours PRN  HYDROmorphone  Injectable 0.5 milliGRAM(s) IV Push daily PRN        Vital signs:  T(C): 37.7 (04-03-25 @ 09:00), Max: 38.2 (04-03-25 @ 02:20)  HR: 109 (04-03-25 @ 11:16) (105 - 125)  BP: 124/86 (04-03-25 @ 09:00) (124/86 - 146/78)  RR: 18 (04-03-25 @ 09:00) (18 - 18)  SpO2: 98% (04-03-25 @ 11:16) (98% - 100%)  Wt(kg): 197.1 kg (04-01-25)        04-02-25 @ 07:01  -  04-03-25 @ 07:00  --------------------------------------------------------  IN: 0 mL / OUT: 5400 mL / NET: -5400 mL      Constitutional: NAD, A&OX3. Morbidly Obese. Patient able to assist with turning bilaterally.  (+) bariatric low airloss support surface (+) fluidized positioning devices, bariatric seat cushion on chair, refusing to offload heels due to constant neuropathic pain- education provided was able to slightly elevate with frankie-lock positioning device.  HEENT: NC/AT, non icteric, mucosa moist. Tracheostomy site silicone foam with border changed-, stoma [id number], peristomal skin intact;   Cardiovascular: tachycardic   Respiratory: supplemental oxygen via NC, nonlabored, equal chest expansion.  Gastrointestinal: soft NT/ND.   (+) PEG peristomal denuded epidermis from 5-6 o'clock extending 0.3cm from edge (prev 0.5cm), exposing pink-moist dermis, scant serous drainage, no purulent drainage, remaining peristomal skin intact- no edema, no obvious erythema, no induration, no fluctuance, no crepitus.  Increase moisture beneath ABD pannus and bilateral groin, skin intact. Incontinent scant pasty stool, perineal care provided   : penile pouch in place, seal intact.  Musculoskeletal:  aROM to b/l UE, b/l le limited aROM, requires 1 -2 person assistance with turning and positioning, no gross deformities or contractures.  Vascular: Deferred, to Podiatry, b/l feet dressings c/d/i.  Skin:  moist w/ good turgor. Intertriginous folds of posterior trunk along bilateral flank- fading linear hyperpigmentation, skin intact.  Sacrum/gluteal cleft and bilateral buttocks Unstageable pressure injury complicated by moisture and incontinence (pt turned to right side)  -Sacral/gluteal cleft- 8.5cmx3.9utd2xr (prev 8.5cmx3.5cmx4.5cm), undermining at 1 o'clock extends 1cm, deep tunnel at 11 o'clock extends at least 6.5cm (toward left buttock), with no probe to bone  -Tissue type 50% tan-grey firmly attached nonfluctuant slough, remaining pink-red moist subcutaneous base and agranulation  -Small-moderate serofibrinous drainage, no purulent drainage, no odor.  -Shallow ulcerations to bilateral buttocks extending from wound edge:   Right buttock shallow ulceration- [id number] (prev 11cmx8.5cmx0.2cm)   Left buttock shallow ulceration: 4.9grn3sbz0.2cm (prev 5.6kfk6buw7.2cm)  Tissue type of satellite ulcerations 100% pink-moist dermis with scattered fibrinous exudate and re-epithelialization migrating from wound edges.  Periwound skin with area of nonfluctuant induration extending from edge of right buttock ulceration from 12-3 o'clock measuring 5cmx5.5cm (stable), remaining periwound skin without obvious erythema, no edema, no increased warmth, no induration, no fluctuance, no crepitus.          LABS/ CULTURES/ RADIOLOGY:              7.8    16.78 >-----------<  365      [04-03-25 @ 04:23]              24.1     133  |  88  |  6   ----------------------------<  105      [04-03-25 @ 04:23]  3.4   |  28  |  0.74        Ca     8.5     [04-03-25 @ 04:23]      Mg     1.70     [04-03-25 @ 04:23]      Phos  4.2     [04-03-25 @ 04:23]                CAPILLARY BLOOD GLUCOSE      POCT Blood Glucose.: 119 mg/dL (03 Apr 2025 12:26)  POCT Blood Glucose.: 114 mg/dL (03 Apr 2025 08:10)  POCT Blood Glucose.: 118 mg/dL (02 Apr 2025 21:41)  POCT Blood Glucose.: 117 mg/dL (02 Apr 2025 17:03)        A1C with Estimated Average Glucose Result: 6.7 % (02-25-25 @ 05:44)        Culture - Blood (collected 03-30-25 @ 23:52)  Source: Blood Blood-Peripheral  Preliminary Report (04-03-25 @ 10:01):    No growth at 72 Hours        ACC: [id number] EXAM:  CT PELVIS ONLY IC   ORDERED BY: ANTONIA SU     PROCEDURE DATE:  03/29/2025          INTERPRETATION:  CLINICAL INFORMATION: Evaluation of sacral wound.    COMPARISON: CT abdomen pelvis 3/17/2025    CONTRAST/COMPLICATIONS:  IV Contrast: Omnipaque 350  120 cc administered   30 cc discarded  Oral Contrast: NONE  .    PROCEDURE:  CT of the Pelvis was performed.  Sagittal and coronal reformats were performed.    FINDINGS:  BLADDER: Within normal limits.  REPRODUCTIVE ORGANS: Prostate within normal limits.  LYMPH NODES: No pelvic lymphadenopathy.    VISUALIZED PORTIONS:  ABDOMINAL ORGANS: Within normal limits.  BOWEL: Within normal limits.  PERITONEUM/RETROPERITONEUM: Within normal limits.  VESSELS: Within normal limits.  ABDOMINAL WALL: Superficial midline skin thickening overlying the region   of the sacrum, in keeping with the clinical history of sacral wound. Few   bubbles of gas along the skin, possibly related to ulceration. Underlying   subcutaneous edema is present, without evidence of tract or drainable   fluid collection.    Skin thickening and subcutaneous edema along the left lower anterior   abdominal wall pannus, decreased compared to prior exam.    BONES: Within normal limits.    IMPRESSION:  *  Superficial skin thickening overlying the sacrum with underlying edema   of the subcutaneous fat, in keeping with the clinical history of sacral   wound.  *  No evidence of underlying tract or drainable fluid collection.    --- End of Report ---          AMANDA JOHNSON MD; Resident Radiologist  This document has been electronically signed.  LYLY JAVIER MD; Attending Radiologist  This document has been electronically signed. Mar 30 2025 11:28AM      ACC: [id number] EXAM:  MR SPINE LUMBAR   ORDERED BY: ANTONIA SU     PROCEDURE DATE:  03/30/2025          INTERPRETATION:  CLINICAL INFORMATION: Low back pain    ADDITIONAL CLINICAL INFORMATION: Not Applicable    TECHNIQUE: Multiplanar, multisequence MRI was performed of the lumbar   spine.  IV Contrast:    PRIOR STUDIES: No priors available for comparison.    FINDINGS:  Patient unable to complete axial imaging due to patient pain.    LOCALIZER: No additional findings.  BONES: There is no fracture or bone marrow edema.  ALIGNMENT: The alignment is normal.  SACROILIAC JOINTS/SACRUM: There is no sacral fracture. The SI joints are   partially visualized but are intact.  CONUS AND CAUDA EQUINA: The distal cord and conus are normal in signal.   Conus terminates at L1.  VISUALIZED INTRAPELVIC/INTRA-ABDOMINAL SOFT TISSUES: Normal.  PARASPINAL SOFT TISSUES: Normal.      INDIVIDUAL LEVELS:    LOWER THORACIC SPINE: No spinal canal or neuroforaminal stenosis.    L1-L2: No spinal canal or neuroforaminal stenosis.  L2-L3: No spinal canal or neuroforaminal stenosis.  L3-L4: Mild disc degeneration with minimal loss of disc height and signal   within the nucleus pulposus. Mild bulge flattens the ventral thecal sac   and minimally narrows the BILATERAL neural foramina . No spinal canal   stenosis.  L4-L5: Moderate disc degeneration with loss of disc height and signal   within the nucleus pulposus. Disc bulge flattens the ventral thecal sac   and moderately narrows the BILATERAL neural foramina.Superimposed small   central disc herniation further compresses the ventral thecal sac   contributing to mild central stenosis.  L5-S1: No spinal canal or neuroforaminal stenosis.      IMPRESSION:    Limited examination due to lack of axial imaging. Mild L3-4 and moderate   L4-5 disc degeneration with bulges that narrow the BILATERAL neural   foramina due to a mild degree at L3-4 and moderate degree at L4-5.   Superimposed central disc herniation at L4-5 contributes to mild central   stenosis at this level.    --- End of Report ---            RAGHAV GORDILLO MD; Attending Radiologist  This document has been electronically signed. Mar 31 2025  7:08AM

## 2025-04-03 NOTE — PROGRESS NOTE ADULT - ASSESSMENT
38 YO M with PMHx of morbid obesity, BEA on CPAP, pAFIB (not on AC), HTN, and BL papilledema attributed to pseudotumor cerebri who presented initially to [facility] on 2/24 with SOB and BL LE edema. Found with URI outpatient with progressive SOB, and concern for noted for MFPNA on imaging. Course progressed with AHRF with worsening O2 demand, respiratory distress, secretions, and somnolence requiring intubation (difficult with success after 6 attempts) in ED and admission to [facility] MICU. While in MICU, patient noted with increasing O2 demand with no improvement despite optimal vent management. Concern noted for progressive ARDS, unable to prone given morbid obesity, and ultimately cannulated for vvECMO on 2/25 and transferred to [facility] for further management on 2/26. While at [facility], tx with diuresis and ABX, and ultimately decannulated and s/p 60XLTCP trach and PEG on 3/7. Patient ultimately transferred to RCU on 3/11 and course complicated by recurrent high grade fevers and found with fungemia.    NEUROLOGY  # Hx of Pseudotumor Cerebri   - s/p LP in 2024 with high opening pressure  - s/p MRI 2024 with possible pituitary mass but unclear because of pressure effect from pseudotumor cerebri causing partially empty sella.  - Prolactin elevated   - ACTH low but recieved steroids during admission   - FT4 low normal and TSH normal, but given FT4 low normal TSH should be higher   - Discuss endocrine consult for inhouse work up vs outpatient.     # Sedation   - Weaned off sedation in ICU   - Nimbex turned off 2/27   - Prop turned off 3/9   - Precedex turned off and catapres started 3/11  - Continue on catapres 0.2 TID (decreased 3/25) - taper done 3/31 to 0.1 tid    # Insomnia   - Patient worked night shift x 15 years   - Trouble sleeping at night leading to day time sleepiness and poor participation with PT  - Continue on Seroquel 25 QHS (increased 3/18)   - Continue on melatonin   - Continue on neurontin as below    # BL LE neuropathy   - CIPN concern   - Neurontin increased to 300mg Q8H with relief   - added Percocet Q4H for moderate pain (3/25) and continue on Dilaudid Q6H for severe pain   - Pain mgt called to consult - with recs for tylenol ATC, NEurontin increased to 400mg q6, Oxycodone 5 q4h prn , Dilaudid for BREAKTHROUGH pain only, Lidocaine patches on each leg, ice prn to feet  - Some improvement in pain - PT consult for TENS,   - oxycodone inc to 10mg as pt with no relief /buttocks area indurated /tender CT ordered   - C/w Gabapentin : 600/600/800 and uptitrate as required  - Switch IV Dilaudid to PO Dilaudid 2mg Q6H PRN severe and 1mg Q12H PRN moderate pain     CARDIOVASCULAR  # HTN   - HTN noted in ICU requiring cardene and esmolol GTTs   - Lisinopril dc'ed to increase catapres   - Continue on norvasc 10 and catapres 0.3 TID   - Monitor BP     # AFIB   - Hx of pAFIB   - No RVR episodes or pAFIB episodes in ICU   - No rate control medications needed  - Monitor on telemetry     # RV dilation second to PHTN   - POCUS on arrival to [facility] with RVE concern   - TTE 10/2024 with EF 55-60 with normal LVSF, moderate LVH and normal RVSF and size with no concern for pHTN   - TTE on 2/25 at  with EF 61 and normal LVSF, but enlarged RV size with reduced RVSF, mild TR, mild PHTN with PASP 49, and dilated IVC to 3.4cn, but normal TAPSE 2.1.  - Continue on aggressive diuresis in ICU    - TTE 3/11 with RVSF reduced with TAPSE 1.6. IVC improved to 2.12cm.   - Continue on lasix 40 PO BID   - Monitor IO/BMP    RESPIRATORY  # ARDS second to MFPNA   - Hx of BEA on CPAP presented to  post URI with SOB and found with AHRF with progression to ARDS second to post viral MFPNA   - Intubated 2/25   - Cannulated for vvECMO 2/26   - s/p 60XLTCP tracheostomy 3/7   - Decannulated 3/7   - CTSx removed tracheostomy and ECMO sutures on 3/17   - Continue on TC QD with PMV as able with strong phonation  - Continue on PS 18/10/40 QHS given OHS   - Continue on nebs and HTS   -  trials continue and now trialing overnight with BIPAP for OHS (10/8/12)  - trend VBG QD  - Decannulated 3/28   - Using nasal cannula 4L     GI  # Dysphagia   - s/p PEG 3/7   - Attempted green dye on 3/13 and failed  - Continue on TF  - Continue on miralax and senna  - RPT green dye passed 3/19  - 3/20 Passed FEEST - on soft bite sized with thin liquids     # Mild transaminitis   - LFTs elevated in ICU with TBILI elevated likely from ECMO hemolysis   - US ABD with hepatic steatosis   - Trend LFTs      RENAL  # ELLA   - ELLA likely from cardiorenal with RVE   - Diuresed in ICU and improved   - Monitor renal function and UOP     # Hypernatremia   - Hypernatremia with fever spikes   - Fever curve improved and attempting to wean FWF   - Continue on  Q6H (decreased 3/19)    INFECTIOUS DISEASE  # Recurrent fevers with fungemia from unknown source   - Recurrent fevers post ECMO decannulation thought initially to be cytokine surge   - BCx, SCx, UCx, RVP and MRSA RPT on 3/12 negative   - Completed zosyn (3/12-3/18) empirically with improved WBC , however recurrent fevers noted.   - RPT SCx, UA, CXR and RVP negative  - TTE 3/17 with no IE  - PanCT 3/17 with PNA and persistent panniculitis   - BCx 3/15 and 3/16 with candida albicans.   - Concern for possible source from panniculitis   - Continue on caspo (3/17 - ) and RPT BCx 3/18 negative  - ID following  - plan for 2 week course after negative BCx to 4/2  - 3/20 Bcx- NGTD  - 3/30 spiked temp cx's sent + ua and c/s pending Bcx neg to date - started on vanco /zosyn ID following       # MFPNA and abdominal pannus cellulitis   - Presented to  post URI with SOB and found with AHRF with progression to ARDS second to post viral MFPNA   - RVP, legionella, strept, BAL, BCx, UCx, HIV, PCP, and adenovirus PCR negative   - Initially started on multiple agents in ICU and ultimatelty completed zosyn (2/26-3/9) ZMAX (2/25-2/26) and vanco (2/26-3/1)     # R/o Sacral OM  - Noted with hard indurated sacral wound by Wound Care Team  - CT Pelvis (3/29) revaeling superficial skin thickening overlying the sacrum with underlying edema of the subcutaneous fat, in keeping with the clinical history of sacral wound. No evidence of underlying tract or drainable fluid collection.    # Penile discharge   - GC/ chlamydia negative   - HIV negative   - Monitor for now    # PPX   - MRSA PCR positive and completed bactroban (2/26-3/3)     HEME   # Anemia likely second to ECMO circuit vs AOCD   - HH low in ICU second to ECMO circuit   - HH dropping again today however no bleeding noted   - Microcytic and hemochromic, sent anemia panel 3/19  - Trend HH     VASCULAR   # R IJ and BL LE DVTs   - Post ECMO dopplers on 3/9 negative for BL UE/ LE DVTs.   - Subsequent post ECMO dopplers performed on 3/14 and noted with acute, non-occlusive deep vein thrombosis noted within the right internal jugular vein. acute, occlusive deep vein thromboses noted within the right and left peroneal veins at both mid calves, and age-indeterminate, occlusive deep vein thrombosis visualized within the left gastrocnemius vein at the proximal calf.     - Continue on argatroban GTT and change to eliquis     PODIATRY   # BL plantar feet blisters likely pressors induced  - Seen by podiatry and blisters lanced on 3/4 and again on 3/18  - Continue on local wound care per podiatry   - Podiatry following  - OK for WBAT will fu with PT for offloading shoe if appropriate  - Pain mgt consult   - Podiatry FU 3/28 done /following     ENDOCRINE  # Pre-DM2   - A1C 6.7  - Continue on ISS   - Monitor FS   - IF no demand then stop ISS     LINES/ TUBES  - R IJ and R FEM ECMO (2/26-3/7)     SKIN  # Pannus canndial intertrigo   # Sacrum/ buttock MAD  - Continue on clotrimazole cream   - WOC appreciated   -Seen by WC pt noted to have oozing from Buttock wound surgicel placed by wound care On follow up no further bleeding  - Wound care placed orders   - No bleeding     ETHICS/ GOC    - FULL CODE     DISPO - PT following  Seen by PMR for acute rehab per recs      38 YO M with PMHx of morbid obesity, BEA on CPAP, pAFIB (not on AC), HTN, and BL papilledema attributed to pseudotumor cerebri who presented initially to [facility] on 2/24 with SOB and BL LE edema. Found with URI outpatient with progressive SOB, and concern for noted for MFPNA on imaging. Course progressed with AHRF with worsening O2 demand, respiratory distress, secretions, and somnolence requiring intubation (difficult with success after 6 attempts) in ED and admission to [facility] MICU. While in MICU, patient noted with increasing O2 demand with no improvement despite optimal vent management. Concern noted for progressive ARDS, unable to prone given morbid obesity, and ultimately cannulated for vvECMO on 2/25 and transferred to [facility] for further management on 2/26. While at [facility], tx with diuresis and ABX, and ultimately decannulated and s/p 60XLTCP trach and PEG on 3/7. Patient ultimately transferred to RCU on 3/11 and course complicated by recurrent high grade fevers and found with fungemia.    NEUROLOGY  # Hx of Pseudotumor Cerebri   - s/p LP in 2024 with high opening pressure  - s/p MRI 2024 with possible pituitary mass but unclear because of pressure effect from pseudotumor cerebri causing partially empty sella.  - Prolactin elevated   - ACTH low but recieved steroids during admission   - FT4 low normal and TSH normal, but given FT4 low normal TSH should be higher   - Discuss endocrine consult for inhouse work up vs outpatient.     # Sedation   - Weaned off sedation in ICU   - Nimbex turned off 2/27   - Prop turned off 3/9   - Precedex turned off and catapres started 3/11  - Continue on catapres 0.2 TID (decreased 3/25) - taper done 3/31 to 0.1 tid - discontinued 4/3     # Insomnia   - Patient worked night shift x 15 years   - Trouble sleeping at night leading to day time sleepiness and poor participation with PT  - Continue on Seroquel 25 QHS (increased 3/18)   - Continue on melatonin   - Continue on neurontin as below    # BL LE neuropathy   - CIPN concern   - Neurontin increased to 300mg Q8H with relief   - added Percocet Q4H for moderate pain (3/25) and continue on Dilaudid Q6H for severe pain   - Pain mgt called to consult - with recs for tylenol ATC, NEurontin increased to 400mg q6, Oxycodone 5 q4h prn , Dilaudid for BREAKTHROUGH pain only, Lidocaine patches on each leg, ice prn to feet  - Some improvement in pain - PT consult for TENS,   - oxycodone inc to 10mg as pt with no relief /buttocks area indurated /tender CT ordered   - C/w Gabapentin : 600/600/800 and uptitrate as required  - Switched to PO Dilaudid 2mg Q6H PRN moderate-severe and IV Dilaudid PRN severe breakthrough and with dressing changes     CARDIOVASCULAR  # HTN   - HTN noted in ICU requiring cardene and esmolol GTTs   - Lisinopril dc'ed to increase catapres   - Continue on norvasc 10 and catapres 0.3 TID   - Monitor BP     # AFIB   - Hx of pAFIB   - No RVR episodes or pAFIB episodes in ICU   - No rate control medications needed  - Monitor on telemetry     # RV dilation second to PHTN   - POCUS on arrival to [facility] with RVE concern   - TTE 10/2024 with EF 55-60 with normal LVSF, moderate LVH and normal RVSF and size with no concern for pHTN   - TTE on 2/25 at  with EF 61 and normal LVSF, but enlarged RV size with reduced RVSF, mild TR, mild PHTN with PASP 49, and dilated IVC to 3.4cn, but normal TAPSE 2.1.  - Continue on aggressive diuresis in ICU    - TTE 3/11 with RVSF reduced with TAPSE 1.6. IVC improved to 2.12cm.   - Continue on lasix 40 PO BID   - Monitor IO/BMP    RESPIRATORY  # ARDS second to MFPNA   - Hx of BEA on CPAP presented to  post URI with SOB and found with AHRF with progression to ARDS second to post viral MFPNA   - Intubated 2/25   - Cannulated for vvECMO 2/26   - s/p 60XLTCP tracheostomy 3/7   - Decannulated 3/7   - CTSx removed tracheostomy and ECMO sutures on 3/17   - Continue on TC QD with PMV as able with strong phonation  - Continue on PS 18/10/40 QHS given OHS   - Continue on nebs and HTS   -  trials continue and now trialing overnight with BIPAP for OHS (10/8/12)  - Decannulated 3/28   - Using nasal cannula 4L     GI  # Dysphagia   - s/p PEG 3/7   - Attempted green dye on 3/13 and failed  - Continue on TF  - Continue on miralax and senna  - RPT green dye passed 3/19  - 3/20 Passed FEEST - on soft bite sized with thin liquids     # Mild transaminitis   - LFTs elevated in ICU with TBILI elevated likely from ECMO hemolysis   - US ABD with hepatic steatosis   - Trend LFTs      RENAL  # ELLA   - ELLA likely from cardiorenal with RVE   - Diuresed in ICU and improved   - Monitor renal function and UOP     # Hypernatremia   - Hypernatremia with fever spikes   - Fever curve improved and attempting to wean FWF   - Continue on  Q6H (decreased 3/19)    INFECTIOUS DISEASE  # Recurrent fevers with fungemia from unknown source   - Recurrent fevers post ECMO decannulation thought initially to be cytokine surge   - BCx, SCx, UCx, RVP and MRSA RPT on 3/12 negative   - Completed zosyn (3/12-3/18) empirically with improved WBC , however recurrent fevers noted.   - RPT SCx, UA, CXR and RVP negative  - TTE 3/17 with no IE  - PanCT 3/17 with PNA and persistent panniculitis   - BCx 3/15 and 3/16 with candida albicans.   - Concern for possible source from panniculitis   - Continue on caspo (3/17 - ) and RPT BCx 3/18 negative  - ID following  - plan for 2 week course after negative BCx to 4/2  - 3/20 Bcx- NGTD  - 3/30 spiked temp cx's sent + ua and c/s pending Bcx neg to date - started on vanco /zosyn ID following   - 4/3 - persistent fevers, leukocytosis. Will repeat CT C/A/P to evaluate for source       # MFPNA and abdominal pannus cellulitis   - Presented to  post URI with SOB and found with AHRF with progression to ARDS second to post viral MFPNA   - RVP, legionella, strept, BAL, BCx, UCx, HIV, PCP, and adenovirus PCR negative   - Initially started on multiple agents in ICU and ultimatelty completed zosyn (2/26-3/9) ZMAX (2/25-2/26) and vanco (2/26-3/1)     # R/o Sacral OM  - Noted with hard indurated sacral wound by Wound Care Team  - CT Pelvis (3/29) revaeling superficial skin thickening overlying the sacrum with underlying edema of the subcutaneous fat, in keeping with the clinical history of sacral wound. No evidence of underlying tract or drainable fluid collection.    # Penile discharge   - GC/ chlamydia negative   - HIV negative   - Monitor for now    # PPX   - MRSA PCR positive and completed bactroban (2/26-3/3)     HEME   # Anemia likely second to ECMO circuit vs AOCD   - HH low in ICU second to ECMO circuit   - HH dropping again today however no bleeding noted   - Microcytic and hemochromic, sent anemia panel 3/19  - Trend HH     VASCULAR   # R IJ and BL LE DVTs   - Post ECMO dopplers on 3/9 negative for BL UE/ LE DVTs.   - Subsequent post ECMO dopplers performed on 3/14 and noted with acute, non-occlusive deep vein thrombosis noted within the right internal jugular vein. acute, occlusive deep vein thromboses noted within the right and left peroneal veins at both mid calves, and age-indeterminate, occlusive deep vein thrombosis visualized within the left gastrocnemius vein at the proximal calf.     - Continue on argatroban GTT and change to eliquis     PODIATRY   # BL plantar feet blisters likely pressors induced  - Seen by podiatry and blisters lanced on 3/4 and again on 3/18  - Continue on local wound care per podiatry   - Podiatry following  - OK for WBAT will fu with PT for offloading shoe if appropriate  - Pain mgt consult   - Podiatry FU 3/28 done /following     ENDOCRINE  # Pre-DM2   - A1C 6.7  - Continue on ISS   - Monitor FS   - IF no demand then stop ISS     LINES/ TUBES  - R IJ and R FEM ECMO (2/26-3/7)     SKIN  # Pannus canndial intertrigo   # Sacrum/ buttock MAD  - Continue on clotrimazole cream   - WOC appreciated   -Seen by WC pt noted to have oozing from Buttock wound surgicel placed by wound care On follow up no further bleeding  - Wound care placed orders   - No bleeding     ETHICS/ GOC    - FULL CODE     DISPO - PT following  Seen by PMR for acute rehab per recs

## 2025-04-03 NOTE — PROGRESS NOTE ADULT - ASSESSMENT
Assessment/Plan:      -Continue to offload pressure; low airloss support surface, turn and position per protocol with use of fluidized positioning devices, continue incontinence care per protocol and use of single breathable incontinence pads, continue to offload heels with complete cair boots.  -Continue Nutritional management as per RD recommendations.    Upon discharge follow up at outpatient [facility]. [address] [place] [address]. [phone].      Findings and plan discussed with patient, family and primary team. All questions and concerns addressed to meet patient and family's satisfaction.  Will continue to follow perioidcally while inpatient, please reconsult earlier as needed.  Remainder of care per primary team.  Thank you.    EDOUARD Driscoll, STEFANIE   MS TEAMS    If after 4PM or before 7:30AM on Mon-Friday or weekend/holiday please contact general surgery for urgent matters.   Team A- [id number]   Team B- [id number]  For non-urgent matters e-mail [email or web address]    I/We spent 30 minutes face-to-face with this patient of which more than 50% of the time was spent counseling/coordinating care of this patient.       Assessment/Plan: 40 YO M with PMHx of morbid obesity, BEA on CPAP, pAFIB (not on AC), HTN, and BL papilledema attributed to pseudotumor cerebri who presented initially to [facility] on 2/24 with SOB and BL LE edema. Found with URI outpatient with progressive SOB, and concern for noted for MFPNA on imaging. Course progressed with AHRF with worsening O2 demand, respiratory distress, secretions, and somnolence requiring intubation (difficult with success after 6 attempts) in ED and admission to [facility] MICU. While in MICU, patient noted with increasing O2 demand with no improvement despite optimal vent management. Concern noted for progressive ARDS, unable to prone given morbid obesity, and ultimately cannulated for vvECMO on 2/25 and transferred to [facility] for further management on 2/26. While at [facility], tx with diuresis and ABX, and ultimately decannulated and s/p 60XLTCP trach and PEG on 3/7. Patient ultimately transferred to RCU on 3/11 and course complicated by recurrent high grade fevers and found with fungemia.      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   -Risk for intertriginous moisture associated dermatitis.  -Peritubular skin management of PEG    Plan:  -Per records initially with deep tissue pressure injury, now evolved to unstageable pressure injury likely to evolve further to stage 4 pressure injury, remains with increased moisture secondary to body habitus and fevers, fecal and urinary incontinence, history of critical illness (mechanical ventilation > 72 hours, sepsis), and limited mobility.   -Tissue type improving/responding to Dakins 1/4 strength less necrotic tissue, remaining adherent; risk of bleeding outweighs benefit.  -Right buttock area of nonfluctuant induration remains; overall wound without obvious s/s of acute soft tissue infection.  -CT abdomen/pelvis with IV contrast with superficial thickening overyling the sacrum with underlying edema of the SubQ fat, no evidence of underlying tract or drainable fluid collection- findings consistent with clinical exam- wound without palpable and/or obvious drainable collections. Now with tunnel but extends towards left buttock.  -MRI lumbar spine- with no mention of sacral ulcer- likely unable to see given area of focus. Unsure extent of injury, although wound does not appear to be acutely infected, would recommend repeat MRI with extended cuts to include sacral and bilateral buttocks r/o deep infection and/or osteomyelitis.  -Continue topical recommendations.  -Topical recommendations:   ·	Sacral/gluteal cleft to bilateral buttocks- cleanse wound and satellite ulcerations to b/l buttocks with NS, Dry well. Apply Liquid barrier film to periwound skin. Apply hydrocolloid to bilateral buttock shallow ulcerations, massage to facilitate adhesion of dressing- if dressing is not soiled may change every 3 days. Pack sacral/gluteal cleft with Dakins 1/4 strength moistened 2" kerlix, leave at least 2" out at end to ensure full removal of packing with subsequent dressing changes. Cover with 4x4 gauze and tegaderm. Change twice a day and prn if soiled/compromised. Once dressing is in place and perineal care is provided, apply lida moisture barrier cream to exposed skin every shift and prn.  ·	Peritubular Skin of PEG: Cleanse q shift with NS, apply liquid barrier film beneath matt disc.  Apply thin foam  dressing without border (Mepilex Lite)- cut to mid dressing with a 'Y' shape. Secondary securement to abdomen taping in 'H' fashion with Steri-strips.  ·	Bilateral abdominal pannus, bilateral groin: Cleanse with luke-warm soap and water, dry well. Apply Interdry textile sheeting, under intertriginous folds leaving 2 inches exposed at ends to wick, remove to wash & dry affected area, then replace. Individual sheeting may be used for up to 5 days unless soiled. Inspect skin daily.   -Continue to offload pressure; bariatric low airloss support surface, turn and position per protocol with use of fluidized positioning devices, continue incontinence and moisture care per protocol and use of single breathable incontinence pads, continue to offload heels with fluidized positioning devices/Ochoa-Lock positioning device (PS# [id number]). Continue use of bariatric seat cushion (people soft #[id number]) and limit sit time- no more than 2 consecutive hours at any given time.   -Continue Nutritional management as per RD recommendations.  -B/l feet management per Podiatry surgery  -Appreciate PT and safe patient handling    Upon discharge follow up at outpatient [facility]. [address] [place] [address]. [phone].    Findings and plan discussed with patient, ID Dr. Owens and primary team MAX Smith. All questions and concerns addressed to meet patient's satisfaction.  Will continue to follow periodically while inpatient, please reconsult earlier as needed.  Remainder of care per primary team.  Thank you.    EDOUARD Driscoll, STEFANIE   MS TEAMS    If after 4PM or before 7:30AM on Mon-Friday or weekend/holiday please contact general surgery for urgent matters.   Team A- [id number]   Team B- [id number]  For non-urgent matters e-mail [email or web address]    I spent 50 minutes face-to-face with this patient of which more than 50% of the time was spent counseling/coordinating care of this patient.

## 2025-04-03 NOTE — PROGRESS NOTE ADULT - SUBJECTIVE AND OBJECTIVE BOX
CHIEF COMPLAINT:Patient is a 39y old  Male who presents with a chief complaint of sob (02 Mar 2025 06:19)      INTERVAL EVENTS: febrile overnight (tmax 101.5F). Remains on Caspo, Vanc/Zosyn. C/o persistent LE pain, titrating pain regimen.     ROS: Seen by bedside during AM rounds     OBJECTIVE:  ICU Vital Signs Last 24 Hrs  T(C): 37.5 (02 Apr 2025 02:00), Max: 38.6 (01 Apr 2025 20:00)  T(F): 99.5 (02 Apr 2025 02:00), Max: 101.5 (01 Apr 2025 20:00)  HR: 119 (02 Apr 2025 06:55) (113 - 130)  BP: 125/60 (02 Apr 2025 02:00) (125/60 - 151/89)  BP(mean): --  ABP: --  ABP(mean): --  RR: 20 (02 Apr 2025 02:00) (19 - 22)  SpO2: 98% (02 Apr 2025 06:55) (95% - 100%)    O2 Parameters below as of 02 Apr 2025 02:00  Patient On (Oxygen Delivery Method): BiPAP/CPAP              04-01 @ 07:01  -  04-02 @ 07:00  --------------------------------------------------------  IN: 2000 mL / OUT: 2400 mL / NET: -400 mL      CAPILLARY BLOOD GLUCOSE      POCT Blood Glucose.: 115 mg/dL (01 Apr 2025 21:08)      PHYSICAL EXAM:  General: NAD   Neck: Dressing overlying preexisting trach tube stoma  Cards: S1/S2, no murmurs   Pulm: CTA bilaterally. No wheezes.   Abdomen: Morbidly obese abdomen. Soft, NTND. (+) PEG noted, site c/d/i.   Extremities: Mild b/l LE pedal edema. Extremities warm to touch.  Neurology: Awake/alert. 5/5 motor strength b/l UE. Weakness b/l LE. Following commands.   Skin: warm to touch, color appropriate for ethnicity. Refer to RN assessment for further details.        HOSPITAL MEDICATIONS:  MEDICATIONS  (STANDING):  albuterol/ipratropium for Nebulization 3 milliLiter(s) Nebulizer every 6 hours  amLODIPine   Tablet 10 milliGRAM(s) Oral daily  apixaban 5 milliGRAM(s) Oral every 12 hours  caspofungin IVPB 70 milliGRAM(s) IV Intermittent every 24 hours  chlorhexidine 2% Cloths 1 Application(s) Topical <User Schedule>  cloNIDine 0.1 milliGRAM(s) Oral every 8 hours  clotrimazole 1% Cream 1 Application(s) Topical two times a day  Dakins Solution - 1/4 Strength 1 Application(s) Topical two times a day  dextrose 50% Injectable 25 Gram(s) IV Push once  dextrose 50% Injectable 12.5 Gram(s) IV Push once  dextrose 50% Injectable 25 Gram(s) IV Push once  furosemide    Tablet 40 milliGRAM(s) Oral two times a day  gabapentin 600 milliGRAM(s) Oral every 8 hours  glucagon  Injectable 1 milliGRAM(s) IntraMuscular once  insulin lispro (ADMELOG) corrective regimen sliding scale   SubCutaneous three times a day before meals  insulin lispro (ADMELOG) corrective regimen sliding scale   SubCutaneous at bedtime  lidocaine   4% Patch 1 Patch Transdermal daily  lidocaine   4% Patch 1 Patch Transdermal daily  magnesium sulfate  IVPB 2 Gram(s) IV Intermittent once  melatonin 3 milliGRAM(s) Oral at bedtime  multivitamin 1 Tablet(s) Oral daily  mupirocin 2% Ointment 1 Application(s) Topical two times a day  pantoprazole    Tablet 40 milliGRAM(s) Oral before breakfast  piperacillin/tazobactam IVPB.. 3.375 Gram(s) IV Intermittent every 8 hours  polyethylene glycol 3350 17 Gram(s) Oral daily  QUEtiapine 25 milliGRAM(s) Oral at bedtime  senna 2 Tablet(s) Oral at bedtime  vancomycin  IVPB 1250 milliGRAM(s) IV Intermittent every 8 hours    MEDICATIONS  (PRN):  acetaminophen     Tablet .. 650 milliGRAM(s) Oral every 6 hours PRN Temp greater or equal to 38C (100.4F), Mild Pain (1 - 3), Moderate Pain (4 - 6)  dextrose Oral Gel 15 Gram(s) Oral once PRN Blood Glucose LESS THAN 70 milliGRAM(s)/deciliter  HYDROmorphone  Injectable 0.5 milliGRAM(s) IV Push every 6 hours PRN MOd-Severe Pain (4 - 10)  oxyCODONE    IR 10 milliGRAM(s) Oral every 4 hours PRN MOD/SEVERE PAIN 4-10      LABS:                        8.0    13.79 )-----------( 371      ( 02 Apr 2025 04:56 )             22.1     04-02    134[L]  |  90[L]  |  6[L]  ----------------------------<  99  3.6   |  29  |  0.83    Ca    8.8      02 Apr 2025 04:56  Phos  4.8     04-02  Mg     1.40     04-02        Urinalysis Basic - ( 02 Apr 2025 04:56 )    Color: x / Appearance: x / SG: x / pH: x  Gluc: 99 mg/dL / Ketone: x  / Bili: x / Urobili: x   Blood: x / Protein: x / Nitrite: x   Leuk Esterase: x / RBC: x / WBC x   Sq Epi: x / Non Sq Epi: x / Bacteria: x         CHIEF COMPLAINT:Patient is a 39y old  Male who presents with a chief complaint of sob (02 Mar 2025 06:19)      INTERVAL EVENTS: febrile overnight. Remains on Caspo, Vanc/Zosyn. C/o persistent LE pain, titrating pain regimen.     ROS: Seen by bedside during AM rounds. Wound care at bedside, no acute signs of infection of sacral wound on exam     OBJECTIVE:  ICU Vital Signs Last 24 Hrs  T(C): 37.5 (02 Apr 2025 02:00), Max: 38.6 (01 Apr 2025 20:00)  T(F): 99.5 (02 Apr 2025 02:00), Max: 101.5 (01 Apr 2025 20:00)  HR: 119 (02 Apr 2025 06:55) (113 - 130)  BP: 125/60 (02 Apr 2025 02:00) (125/60 - 151/89)  BP(mean): --  ABP: --  ABP(mean): --  RR: 20 (02 Apr 2025 02:00) (19 - 22)  SpO2: 98% (02 Apr 2025 06:55) (95% - 100%)    O2 Parameters below as of 02 Apr 2025 02:00  Patient On (Oxygen Delivery Method): BiPAP/CPAP              04-01 @ 07:01  -  04-02 @ 07:00  --------------------------------------------------------  IN: 2000 mL / OUT: 2400 mL / NET: -400 mL      CAPILLARY BLOOD GLUCOSE      POCT Blood Glucose.: 115 mg/dL (01 Apr 2025 21:08)      PHYSICAL EXAM:  General: NAD   Neck: Dressing overlying preexisting trach tube stoma  Cards: S1/S2, no murmurs   Pulm: CTA bilaterally. No wheezes.   Abdomen: Morbidly obese abdomen. Soft, NTND. (+) PEG noted, site c/d/i.   Extremities: Mild b/l LE pedal edema. Extremities warm to touch.  Neurology: Awake/alert. 5/5 motor strength b/l UE. Weakness b/l LE. Following commands.   Skin: warm to touch, color appropriate for ethnicity. Refer to RN assessment for further details.        HOSPITAL MEDICATIONS:  MEDICATIONS  (STANDING):  albuterol/ipratropium for Nebulization 3 milliLiter(s) Nebulizer every 6 hours  amLODIPine   Tablet 10 milliGRAM(s) Oral daily  apixaban 5 milliGRAM(s) Oral every 12 hours  caspofungin IVPB 70 milliGRAM(s) IV Intermittent every 24 hours  chlorhexidine 2% Cloths 1 Application(s) Topical <User Schedule>  cloNIDine 0.1 milliGRAM(s) Oral every 8 hours  clotrimazole 1% Cream 1 Application(s) Topical two times a day  Dakins Solution - 1/4 Strength 1 Application(s) Topical two times a day  dextrose 50% Injectable 25 Gram(s) IV Push once  dextrose 50% Injectable 12.5 Gram(s) IV Push once  dextrose 50% Injectable 25 Gram(s) IV Push once  furosemide    Tablet 40 milliGRAM(s) Oral two times a day  gabapentin 600 milliGRAM(s) Oral every 8 hours  glucagon  Injectable 1 milliGRAM(s) IntraMuscular once  insulin lispro (ADMELOG) corrective regimen sliding scale   SubCutaneous three times a day before meals  insulin lispro (ADMELOG) corrective regimen sliding scale   SubCutaneous at bedtime  lidocaine   4% Patch 1 Patch Transdermal daily  lidocaine   4% Patch 1 Patch Transdermal daily  magnesium sulfate  IVPB 2 Gram(s) IV Intermittent once  melatonin 3 milliGRAM(s) Oral at bedtime  multivitamin 1 Tablet(s) Oral daily  mupirocin 2% Ointment 1 Application(s) Topical two times a day  pantoprazole    Tablet 40 milliGRAM(s) Oral before breakfast  piperacillin/tazobactam IVPB.. 3.375 Gram(s) IV Intermittent every 8 hours  polyethylene glycol 3350 17 Gram(s) Oral daily  QUEtiapine 25 milliGRAM(s) Oral at bedtime  senna 2 Tablet(s) Oral at bedtime  vancomycin  IVPB 1250 milliGRAM(s) IV Intermittent every 8 hours    MEDICATIONS  (PRN):  acetaminophen     Tablet .. 650 milliGRAM(s) Oral every 6 hours PRN Temp greater or equal to 38C (100.4F), Mild Pain (1 - 3), Moderate Pain (4 - 6)  dextrose Oral Gel 15 Gram(s) Oral once PRN Blood Glucose LESS THAN 70 milliGRAM(s)/deciliter  HYDROmorphone  Injectable 0.5 milliGRAM(s) IV Push every 6 hours PRN MOd-Severe Pain (4 - 10)  oxyCODONE    IR 10 milliGRAM(s) Oral every 4 hours PRN MOD/SEVERE PAIN 4-10      LABS:                                   7.8    16.78 )-----------( 365      ( 03 Apr 2025 04:23 )             24.1       04-03    133[L]  |  88[L]  |  6[L]  ----------------------------<  105[H]  3.4[L]   |  28  |  0.74    Ca    8.5      03 Apr 2025 04:23  Phos  4.2     04-03  Mg     1.70     04-03            Urinalysis Basic - ( 02 Apr 2025 04:56 )    Color: x / Appearance: x / SG: x / pH: x  Gluc: 99 mg/dL / Ketone: x  / Bili: x / Urobili: x   Blood: x / Protein: x / Nitrite: x   Leuk Esterase: x / RBC: x / WBC x   Sq Epi: x / Non Sq Epi: x / Bacteria: x

## 2025-04-04 LAB
ANION GAP SERPL CALC-SCNC: 19 MMOL/L — HIGH (ref 7–14)
BUN SERPL-MCNC: 5 MG/DL — LOW (ref 7–23)
CALCIUM SERPL-MCNC: 8.3 MG/DL — LOW (ref 8.4–10.5)
CHLORIDE SERPL-SCNC: 89 MMOL/L — LOW (ref 98–107)
CO2 SERPL-SCNC: 28 MMOL/L — SIGNIFICANT CHANGE UP (ref 22–31)
CREAT SERPL-MCNC: 0.76 MG/DL — SIGNIFICANT CHANGE UP (ref 0.5–1.3)
EGFR: 119 ML/MIN/1.73M2 — SIGNIFICANT CHANGE UP
EGFR: 119 ML/MIN/1.73M2 — SIGNIFICANT CHANGE UP
GLUCOSE BLDC GLUCOMTR-MCNC: 107 MG/DL — HIGH (ref 70–99)
GLUCOSE BLDC GLUCOMTR-MCNC: 115 MG/DL — HIGH (ref 70–99)
GLUCOSE BLDC GLUCOMTR-MCNC: 125 MG/DL — HIGH (ref 70–99)
GLUCOSE BLDC GLUCOMTR-MCNC: 143 MG/DL — HIGH (ref 70–99)
GLUCOSE SERPL-MCNC: 89 MG/DL — SIGNIFICANT CHANGE UP (ref 70–99)
HCT VFR BLD CALC: 27.1 % — LOW (ref 39–50)
HGB BLD-MCNC: 7.8 G/DL — LOW (ref 13–17)
MAGNESIUM SERPL-MCNC: 1.8 MG/DL — SIGNIFICANT CHANGE UP (ref 1.6–2.6)
MCHC RBC-ENTMCNC: 24.7 PG — LOW (ref 27–34)
MCHC RBC-ENTMCNC: 28.8 G/DL — LOW (ref 32–36)
MCV RBC AUTO: 85.8 FL — SIGNIFICANT CHANGE UP (ref 80–100)
NRBC # BLD AUTO: 0.02 K/UL — HIGH (ref 0–0)
NRBC # FLD: 0.02 K/UL — HIGH (ref 0–0)
NRBC BLD AUTO-RTO: 0 /100 WBCS — SIGNIFICANT CHANGE UP (ref 0–0)
PHOSPHATE SERPL-MCNC: 4.7 MG/DL — HIGH (ref 2.5–4.5)
PLATELET # BLD AUTO: 378 K/UL — SIGNIFICANT CHANGE UP (ref 150–400)
POTASSIUM SERPL-MCNC: 3.5 MMOL/L — SIGNIFICANT CHANGE UP (ref 3.5–5.3)
POTASSIUM SERPL-SCNC: 3.5 MMOL/L — SIGNIFICANT CHANGE UP (ref 3.5–5.3)
RBC # BLD: 3.16 M/UL — LOW (ref 4.2–5.8)
RBC # FLD: 22.7 % — HIGH (ref 10.3–14.5)
SODIUM SERPL-SCNC: 136 MMOL/L — SIGNIFICANT CHANGE UP (ref 135–145)
WBC # BLD: 15.38 K/UL — HIGH (ref 3.8–10.5)
WBC # FLD AUTO: 15.38 K/UL — HIGH (ref 3.8–10.5)

## 2025-04-04 PROCEDURE — 99232 SBSQ HOSP IP/OBS MODERATE 35: CPT

## 2025-04-04 PROCEDURE — 99233 SBSQ HOSP IP/OBS HIGH 50: CPT

## 2025-04-04 PROCEDURE — G0545: CPT

## 2025-04-04 RX ORDER — HYDROMORPHONE/SOD CHLOR,ISO/PF 2 MG/10 ML
0.5 SYRINGE (ML) INJECTION ONCE
Refills: 0 | Status: DISCONTINUED | OUTPATIENT
Start: 2025-04-04 | End: 2025-04-04

## 2025-04-04 RX ORDER — HYDROMORPHONE/SOD CHLOR,ISO/PF 2 MG/10 ML
4 SYRINGE (ML) INJECTION EVERY 4 HOURS
Refills: 0 | Status: DISCONTINUED | OUTPATIENT
Start: 2025-04-04 | End: 2025-04-05

## 2025-04-04 RX ADMIN — LIDOCAINE HYDROCHLORIDE 1 PATCH: 20 JELLY TOPICAL at 11:41

## 2025-04-04 RX ADMIN — Medication 3 MILLIGRAM(S): at 21:35

## 2025-04-04 RX ADMIN — Medication 1 TABLET(S): at 11:41

## 2025-04-04 RX ADMIN — SODIUM HYPOCHLORITE 1 APPLICATION(S): 0.12 SOLUTION TOPICAL at 18:19

## 2025-04-04 RX ADMIN — Medication 25 GRAM(S): at 21:33

## 2025-04-04 RX ADMIN — CLOTRIMAZOLE 1 APPLICATION(S): 1 CREAM TOPICAL at 18:20

## 2025-04-04 RX ADMIN — Medication 25 GRAM(S): at 07:49

## 2025-04-04 RX ADMIN — FUROSEMIDE 40 MILLIGRAM(S): 10 INJECTION INTRAMUSCULAR; INTRAVENOUS at 05:39

## 2025-04-04 RX ADMIN — GABAPENTIN 600 MILLIGRAM(S): 400 CAPSULE ORAL at 14:21

## 2025-04-04 RX ADMIN — CASPOFUNGIN ACETATE 260 MILLIGRAM(S): 5 INJECTION, POWDER, LYOPHILIZED, FOR SOLUTION INTRAVENOUS at 03:40

## 2025-04-04 RX ADMIN — POLYETHYLENE GLYCOL 3350 17 GRAM(S): 17 POWDER, FOR SOLUTION ORAL at 11:40

## 2025-04-04 RX ADMIN — MUPIROCIN CALCIUM 1 APPLICATION(S): 20 CREAM TOPICAL at 05:39

## 2025-04-04 RX ADMIN — AMLODIPINE BESYLATE 10 MILLIGRAM(S): 10 TABLET ORAL at 05:40

## 2025-04-04 RX ADMIN — GABAPENTIN 800 MILLIGRAM(S): 400 CAPSULE ORAL at 21:38

## 2025-04-04 RX ADMIN — Medication 0.5 MILLIGRAM(S): at 00:39

## 2025-04-04 RX ADMIN — QUETIAPINE FUMARATE 25 MILLIGRAM(S): 25 TABLET ORAL at 21:34

## 2025-04-04 RX ADMIN — Medication 4 MILLIGRAM(S): at 18:09

## 2025-04-04 RX ADMIN — Medication 166.67 MILLIGRAM(S): at 09:04

## 2025-04-04 RX ADMIN — Medication 0.5 MILLIGRAM(S): at 01:39

## 2025-04-04 RX ADMIN — Medication 166.67 MILLIGRAM(S): at 00:41

## 2025-04-04 RX ADMIN — Medication 2 MILLIGRAM(S): at 08:50

## 2025-04-04 RX ADMIN — CLOTRIMAZOLE 1 APPLICATION(S): 1 CREAM TOPICAL at 05:39

## 2025-04-04 RX ADMIN — Medication 0.5 MILLIGRAM(S): at 12:15

## 2025-04-04 RX ADMIN — Medication 40 MILLIGRAM(S): at 05:40

## 2025-04-04 RX ADMIN — Medication 166.67 MILLIGRAM(S): at 21:43

## 2025-04-04 RX ADMIN — Medication 25 GRAM(S): at 14:19

## 2025-04-04 RX ADMIN — Medication 2 TABLET(S): at 21:33

## 2025-04-04 RX ADMIN — Medication 1 APPLICATION(S): at 05:44

## 2025-04-04 RX ADMIN — APIXABAN 5 MILLIGRAM(S): 2.5 TABLET, FILM COATED ORAL at 05:40

## 2025-04-04 RX ADMIN — Medication 0.5 MILLIGRAM(S): at 10:01

## 2025-04-04 RX ADMIN — APIXABAN 5 MILLIGRAM(S): 2.5 TABLET, FILM COATED ORAL at 18:20

## 2025-04-04 RX ADMIN — MUPIROCIN CALCIUM 1 APPLICATION(S): 20 CREAM TOPICAL at 18:20

## 2025-04-04 RX ADMIN — FUROSEMIDE 40 MILLIGRAM(S): 10 INJECTION INTRAMUSCULAR; INTRAVENOUS at 14:22

## 2025-04-04 RX ADMIN — GABAPENTIN 600 MILLIGRAM(S): 400 CAPSULE ORAL at 05:40

## 2025-04-04 RX ADMIN — Medication 0.5 MILLIGRAM(S): at 20:00

## 2025-04-04 RX ADMIN — SODIUM HYPOCHLORITE 1 APPLICATION(S): 0.12 SOLUTION TOPICAL at 05:38

## 2025-04-04 RX ADMIN — LIDOCAINE HYDROCHLORIDE 1 PATCH: 20 JELLY TOPICAL at 11:40

## 2025-04-04 NOTE — PROGRESS NOTE ADULT - SUBJECTIVE AND OBJECTIVE BOX
Infectious Diseases Follow Up:    Patient is a 39y old  Male who presents with a chief complaint of sob (02 Mar 2025 06:19)      Interval History/ROS:  Afebrile ON, WBC now 15  Pt denies F/C/SOB/abdominal. Has b/l foot pain     Allergies  No Known Allergies        ANTIMICROBIALS:  caspofungin IVPB 70 every 24 hours  piperacillin/tazobactam IVPB.. 3.375 every 8 hours  vancomycin  IVPB 1250 every 8 hours      Current Abx:     Previous Abx     OTHER MEDS:  MEDICATIONS  (STANDING):  acetaminophen     Tablet .. 650 every 6 hours PRN  albuterol/ipratropium for Nebulization 3 every 6 hours PRN  amLODIPine   Tablet 10 daily  apixaban 5 every 12 hours  dextrose 50% Injectable 25 once  dextrose 50% Injectable 12.5 once  dextrose 50% Injectable 25 once  dextrose Oral Gel 15 once PRN  furosemide    Tablet 40 two times a day  gabapentin 600 <User Schedule>  gabapentin 800 <User Schedule>  glucagon  Injectable 1 once  HYDROmorphone   Tablet 4 every 4 hours PRN  HYDROmorphone  Injectable 0.5 daily PRN  insulin lispro (ADMELOG) corrective regimen sliding scale  three times a day before meals  insulin lispro (ADMELOG) corrective regimen sliding scale  at bedtime  melatonin 3 at bedtime  pantoprazole    Tablet 40 before breakfast  polyethylene glycol 3350 17 daily  QUEtiapine 25 at bedtime  senna 2 at bedtime      Vital Signs Last 24 Hrs  T(C): 36.9 (04 Apr 2025 08:00), Max: 37.4 (03 Apr 2025 17:28)  T(F): 98.5 (04 Apr 2025 08:00), Max: 99.4 (03 Apr 2025 17:28)  HR: 116 (04 Apr 2025 08:00) (106 - 116)  BP: 146/89 (04 Apr 2025 08:00) (132/82 - 146/89)  BP(mean): --  RR: 22 (04 Apr 2025 08:00) (18 - 22)  SpO2: 97% (04 Apr 2025 08:00) (96% - 98%)    Parameters below as of 04 Apr 2025 08:00  Patient On (Oxygen Delivery Method): nasal cannula w/ humidification  O2 Flow (L/min): 4      PHYSICAL EXAM:  GENERAL: NAD  HEAD:  Atraumatic, Normocephalic  EYES: EOMI, conjunctiva and sclera clear  CHEST/LUNG: On NC, CTAB  HEART: RRR  ABDOMEN: Soft, Nontender, Nondistended;  Extremities: B/l feet wrapped   PSYCH: AAOx3                          7.8    15.38 )-----------( 378      ( 04 Apr 2025 06:20 )             27.1       04-04    136  |  89[L]  |  5[L]  ----------------------------<  89  3.5   |  28  |  0.76    Ca    8.3[L]      04 Apr 2025 06:20  Phos  4.7     04-04  Mg     1.80     04-04        Urinalysis Basic - ( 04 Apr 2025 06:20 )    Color: x / Appearance: x / SG: x / pH: x  Gluc: 89 mg/dL / Ketone: x  / Bili: x / Urobili: x   Blood: x / Protein: x / Nitrite: x   Leuk Esterase: x / RBC: x / WBC x   Sq Epi: x / Non Sq Epi: x / Bacteria: x        MICROBIOLOGY:  v  Clean Catch Clean Catch (Midstream)  03-31-25   <10,000 CFU/mL Normal Urogenital Kyle  --  --      Blood Blood-Peripheral  03-30-25   No growth at 4 days  --  --      Trach Asp Tracheal Aspirate  03-21-25   Commensal kyle consistent with body site  --    Few polymorphonuclear leukocytes per low power field  No Squamous epithelial cells per low power field  No organisms seen per oil power field      Blood Blood-Peripheral  03-20-25   No growth at 5 days  --  --      Blood Blood-Venous  03-20-25   No growth at 5 days  --  --      Blood Blood-Peripheral  03-18-25   No growth at 5 days  --  Blood Culture PCR  Candida albicans      Nares/Axilla/Groin Nares/Axilla/Groin  03-17-25   Culture NEGATIVE for Candida auris.  This surveillance culture is intended for Infection Control purposes only.  A negative result does not preclude the carriage of other fungal  organisms.  --  --      Blood Blood-Venous  03-16-25   Growth in aerobic bottle: Candida albicans  See previous culture [id number]  --    Growth in aerobic bottle: Yeast like cells      Blood Blood-Peripheral  03-16-25   Growth in aerobic bottle: Candida albicans  See previous culture [id number]  --    Growth in aerobic bottle: Yeast like cells      Trach Asp Tracheal Aspirate  03-16-25   Commensal kyle consistent with body site  --    Few polymorphonuclear leukocytes per low power field  Rare Squamous epithelial cells per low power field  Rare Gram Positive Rods seen per oil power field  Rare Gram Negative Rods seen per oil power field      Blood Blood-Venous  03-15-25   Growth in aerobic bottle: Candida albicans  See previous culture [id number]  --    Growth in aerobic bottle: Yeast like cells      Blood Blood-Peripheral  03-15-25   Growth in aerobic bottle: Candida albicans  Direct identification is available within approximately 3-5  hours either by Blood Panel Multiplexed PCR or Direct  MALDI-TOF. Details: [email or web address]  --  Blood Culture PCR  Candida albicans      Catheterized Catheterized  03-12-25   <10,000 CFU/mL Normal Urogenital Kyle  --  --      Blood Blood-Peripheral  03-11-25   No growth at 5 days  --  --      Blood Blood-Peripheral  03-11-25   No growth at 5 days  --  --      Trach Asp Tracheal Aspirate  03-10-25   Commensal kyle consistent with body site  --    Few polymorphonuclear leukocytes per low power field  No Squamous epithelial cells per low power field  No organisms seen per oil power field                RADIOLOGY:  < from: CT Abdomen and Pelvis w/ IV Cont (04.03.25 @ 21:42) >  IMPRESSION:  Bilateral lung patchy opacities and consolidation, mildly decreased from   prior exam.    Limited field-of-view of the sacral soft tissues due to body habitus.   Visualized soft tissues demonstrating subcutaneous fat stranding and   smalldroplets of air. No drainable fluid collection is seen.    < end of copied text >

## 2025-04-04 NOTE — PROGRESS NOTE ADULT - SUBJECTIVE AND OBJECTIVE BOX
Patient is a 39y old  Male who presents with a chief complaint of sob (02 Mar 2025 06:19)       INTERVAL HPI/OVERNIGHT EVENTS:  Patient seen and evaluated at bedside.  Pt is resting comfortable in NAD. Denies N/V/F/C.  Pain rated at X/10    Allergies    No Known Allergies    Intolerances        Vital Signs Last 24 Hrs  T(C): 36.9 (04 Apr 2025 08:00), Max: 37.4 (03 Apr 2025 17:28)  T(F): 98.5 (04 Apr 2025 08:00), Max: 99.4 (03 Apr 2025 17:28)  HR: 116 (04 Apr 2025 08:00) (106 - 116)  BP: 146/89 (04 Apr 2025 08:00) (132/82 - 146/89)  BP(mean): --  RR: 22 (04 Apr 2025 08:00) (18 - 22)  SpO2: 97% (04 Apr 2025 08:00) (96% - 98%)    Parameters below as of 04 Apr 2025 08:00  Patient On (Oxygen Delivery Method): nasal cannula w/ humidification  O2 Flow (L/min): 4      LABS:                        7.8    15.38 )-----------( 378      ( 04 Apr 2025 06:20 )             27.1     04-04    136  |  89[L]  |  5[L]  ----------------------------<  89  3.5   |  28  |  0.76    Ca    8.3[L]      04 Apr 2025 06:20  Phos  4.7     04-04  Mg     1.80     04-04        Urinalysis Basic - ( 04 Apr 2025 06:20 )    Color: x / Appearance: x / SG: x / pH: x  Gluc: 89 mg/dL / Ketone: x  / Bili: x / Urobili: x   Blood: x / Protein: x / Nitrite: x   Leuk Esterase: x / RBC: x / WBC x   Sq Epi: x / Non Sq Epi: x / Bacteria: x      CAPILLARY BLOOD GLUCOSE      POCT Blood Glucose.: 143 mg/dL (04 Apr 2025 11:09)  POCT Blood Glucose.: 107 mg/dL (04 Apr 2025 07:40)  POCT Blood Glucose.: 106 mg/dL (03 Apr 2025 21:54)  POCT Blood Glucose.: 111 mg/dL (03 Apr 2025 17:04)      Lower Extremity Physical Exam:  Vascular: DP 1/4 B/L, PT 0/4, B/L secondary to +3 pitting edema, CFT <3 seconds B/L, Temperature gradient warm to cool, B/L.   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 Right foot plantar lateral forefoot wound to subQ, no malodor, no pus, no acute signs of infection. Left foot digit 1,2,4 partial thickness dry gangrene. Left foot lateral plantar forefoot wound to subQ, no malodor, no pus, no acute signs of infection.    RADIOLOGY & ADDITIONAL TESTS:

## 2025-04-04 NOTE — PROGRESS NOTE ADULT - SUBJECTIVE AND OBJECTIVE BOX
Patient is a 39y old  Male who presents with a chief complaint of sob (02 Mar 2025 06:19)      HPI:  40 yo M w/ class III obesity, pAfib (no AC), HTN, BEA (on CPAP), history of b/l papilledema attributed to pseudotumor cerebri s/p LP in 2024 with very high opening pressure, who is transferred for VV ECMO for ARDS and severe hypoxia. Patient initially presented to [facility] on 2/24 for SOB and b/l LE edema. 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 He is a current every day smoker of Newports and marijuana, but does not drink or do other drugs. Currently lives with his mother. There is a long term partner in his life, but they are not , and they have an on/off relationship currently not very involved with each other as per the patient's mother and aunt.  At [facility] where he was getting an eval last year for shortness of breath, he had a sleep study and was diagnosed with sleep apnea, prescribed a PAP machine, which he has in his room but the mother is not sure how many nights per week he uses it. Recently, the last day or two, his mother and brother have been under the weather with URIs and the patient 2 days ago started to develop a ruynny nose, ough, some wheezing of the chest, as well as worsening shortness of breath and fevers at home. He called his aunt who told him to go get checked out and then he landed at the [facility] ED. Patient found to be reportedly hypoxemic to 70% on RA with worsening respiratory status/secretions and somnolence in the ED. Patient was intubated with difficulty (7 attempts) due to very large tongue secretions. Despite optimal vent management, patient remained hypoxemic. Unable to prone due to obesity. Patient cannulated for VV ECMO on 2/25 and transferred to [facility] for further management.     [facility] labs notable for MRSA PCR -, Fluvid -, urine legionella -, sputum gram stain  with GPC and GPR    CTA chest on 2/24 negative for pulmonary embolism, notable for patchy bilateral lower lobe opacities, hepatomegaly, mild ascites, edema/induration of the abdominal pannus.    LE duplex on 2/25 negative for DVT    TTE on 2/25 showed LV EF 61%, enlarged RV with TAPSE 2.1cm, ePASP at least 49 (Patient had 10/2024 TTE with normal LV and RV with ePASP 14).    Only home med is Lasix. Not on acetazolamide for pseudotumor or on Wegovy (was pending auth) (26 Feb 2025 01:48)      REVIEW OF SYSTEMS  Constitutional - No fever, No weight loss, No fatigue  HEENT - No eye pain, No visual disturbances, No difficulty hearing, No tinnitus, No vertigo, No neck pain  Respiratory - No cough, No wheezing, No shortness of breath  Cardiovascular - No chest pain, No palpitations  Gastrointestinal - No abdominal pain, No nausea, No vomiting, No diarrhea, No constipation  Genitourinary - No dysuria, No frequency, No hematuria, No incontinence  Neurological - No headaches, No memory loss, + loss of strength, No numbness, No tremors  Skin - No itching, No rashes, No lesions   Endocrine - No temperature intolerance  Musculoskeletal - No joint pain, No joint swelling, +pain  Psychiatric - No depression, No anxiety    PAST MEDICAL & SURGICAL HISTORY  HTN (hypertension)    Atrial fibrillation    Papilledema of both eyes    Chronic sinusitis    Morbid obesity    No significant past surgical history           CURRENT FUNCTIONAL STATUS  4/4 tolerating therapeutic exercise in bed, unable to perform sit to stand transfer due to weakness.  tolerating hailey for transfers    RECENT LABS/IMAGING  CBC Full  -  ( 04 Apr 2025 06:20 )  WBC Count : 15.38 K/uL  RBC Count : 3.16 M/uL  Hemoglobin : 7.8 g/dL  Hematocrit : 27.1 %  Platelet Count - Automated : 378 K/uL  Mean Cell Volume : 85.8 fL  Mean Cell Hemoglobin : 24.7 pg  Mean Cell Hemoglobin Concentration : 28.8 g/dL  Auto Neutrophil # : x  Auto Lymphocyte # : x  Auto Monocyte # : x  Auto Eosinophil # : x  Auto Basophil # : x  Auto Neutrophil % : x  Auto Lymphocyte % : x  Auto Monocyte % : x  Auto Eosinophil % : x  Auto Basophil % : x    04-04    136  |  89[L]  |  5[L]  ----------------------------<  89  3.5   |  28  |  0.76    Ca    8.3[L]      04 Apr 2025 06:20  Phos  4.7     04-04  Mg     1.80     04-04      Urinalysis Basic - ( 04 Apr 2025 06:20 )    Color: x / Appearance: x / SG: x / pH: x  Gluc: 89 mg/dL / Ketone: x  / Bili: x / Urobili: x   Blood: x / Protein: x / Nitrite: x   Leuk Esterase: x / RBC: x / WBC x   Sq Epi: x / Non Sq Epi: x / Bacteria: x        VITALS  T(C): 36.9 (04-04-25 @ 08:00), Max: 37.4 (04-03-25 @ 17:28)  HR: 118 (04-04-25 @ 14:22) (106 - 118)  BP: 147/99 (04-04-25 @ 14:22) (132/82 - 147/99)  RR: 20 (04-04-25 @ 14:22) (18 - 22)  SpO2: 96% (04-04-25 @ 14:22) (96% - 98%)  Wt(kg): --    ALLERGIES  No Known Allergies      MEDICATIONS   acetaminophen     Tablet .. 650 milliGRAM(s) Oral every 6 hours PRN  albuterol/ipratropium for Nebulization 3 milliLiter(s) Nebulizer every 6 hours PRN  amLODIPine   Tablet 10 milliGRAM(s) Oral daily  apixaban 5 milliGRAM(s) Oral every 12 hours  caspofungin IVPB 70 milliGRAM(s) IV Intermittent every 24 hours  chlorhexidine 2% Cloths 1 Application(s) Topical <User Schedule>  clotrimazole 1% Cream 1 Application(s) Topical two times a day  Dakins Solution - 1/4 Strength 1 Application(s) Topical two times a day  dextrose 50% Injectable 25 Gram(s) IV Push once  dextrose 50% Injectable 12.5 Gram(s) IV Push once  dextrose 50% Injectable 25 Gram(s) IV Push once  dextrose Oral Gel 15 Gram(s) Oral once PRN  furosemide    Tablet 40 milliGRAM(s) Oral two times a day  gabapentin 600 milliGRAM(s) Oral <User Schedule>  gabapentin 800 milliGRAM(s) Oral <User Schedule>  glucagon  Injectable 1 milliGRAM(s) IntraMuscular once  HYDROmorphone   Tablet 4 milliGRAM(s) Oral every 4 hours PRN  HYDROmorphone  Injectable 0.5 milliGRAM(s) IV Push daily PRN  insulin lispro (ADMELOG) corrective regimen sliding scale   SubCutaneous three times a day before meals  insulin lispro (ADMELOG) corrective regimen sliding scale   SubCutaneous at bedtime  lidocaine   4% Patch 1 Patch Transdermal daily  lidocaine   4% Patch 1 Patch Transdermal daily  melatonin 3 milliGRAM(s) Oral at bedtime  multivitamin 1 Tablet(s) Oral daily  mupirocin 2% Ointment 1 Application(s) Topical two times a day  pantoprazole    Tablet 40 milliGRAM(s) Oral before breakfast  piperacillin/tazobactam IVPB.. 3.375 Gram(s) IV Intermittent every 8 hours  polyethylene glycol 3350 17 Gram(s) Oral daily  QUEtiapine 25 milliGRAM(s) Oral at bedtime  senna 2 Tablet(s) Oral at bedtime  vancomycin  IVPB 1250 milliGRAM(s) IV Intermittent every 8 hours      ----------------------------------------------------------------------------------------       PHYSICAL EXAM  Constitutional - NAD    Chest - no respiratory distress  Cardiovascular -mild tachy  Abdomen - +PEG  Extremities - dressings b/l feet  Neurologic Exam -                    Cognitive - Awake, Alert, AAO to self, place, date, year, situation      Communication - fluent     Cranial Nerves - CN 2-12 intact     Motor -                     LEFT    UE - 4+/5                    RIGHT UE - 4+/5                    LEFT    LE -  2+/5                      RIGHT LE -  2-/4     Sensory - Intact to LT       Balance - WNL Static  Psychiatric - Mood stable, Affect WNL  ----------------------------------------------------------------------------------------  ASSESSMENT/PLAN  40 yo m PMHx of morbid obesity, BEA on CPAP, pAFIB (not on AC), HTN, and BL papilledema attributed to pseudotumor cerebri who presented initially to [facility] on 2/24 with SOB and BL LE edema. Found with URI outpatient with progressive SOB, and concern for noted for MFPNA on imaging. Course progressed with AHRF with worsening O2 demand, respiratory distress, secretions, and somnolence requiring intubation (difficult with success after 6 attempts) in ED and admission to [facility] MICU. While in MICU, patient noted with increasing O2 demand with no improvement despite optimal vent management. Concern noted for progressive ARDS, unable to prone given morbid obesity, and ultimately cannulated for vvECMO on 2/25 and transferred to [facility] for further management on 2/26.   transferred to [facility] 3/11, course complicated by fever.    s/p trach and peg 3/7   continue bedside PT and OT     Pain - acetaminphen, dilaudid prn, gabapentin 800mg q8, consider pregabalin if pain not improving  DVT PPX - on eliquis  Rehab -  goal is for acute rehab when medically cleared.  Patient can tolerate 3 hours per day of therapy with medical supervision.  Would need to be afebrile and off iv pain medications prior to discharge.       Total time spent to review relevant records and imaging results, examine patient, complete documentation, and when applicable discuss the case with the patient, family, , social workers, and medical team: 35  minutes

## 2025-04-04 NOTE — PROGRESS NOTE ADULT - NS ATTEND AMEND GEN_ALL_CORE FT
Pt is a 39M with MHx obesity (Class III, BMI 69), pAfib (no AC), HTN, BEA (dz'ed 2019, AHI 34 non-compliant on CPAP set at 10), and history of b/l papilledema attributed to pseudotumor cerebri initially presenting as a transfer [facility] on 2/26 for acute hypoxemic respiratory failure s/p ETT intubation/MV with progression to refractory ARDS s/p initiation of vv-ECMO support (2/26-3/7) with failure to wean from ventilator s/p tracheostomy placement, further found to have RV failure s/p aggressive diuresis and PNA followed by severe sepsis 2/2 panniculitis c/b candida albicans fungemia now transferred to [facility] for further medical management.     Pt now awake and alert, spontaneously communicative and at mental status baseline. Continues to have severe functional debility likely 2/2 prolonged critical illness polyneuropathy but is progressing well. Now able to transfer via [place] to chair daily and is independent of UE and fine motor function. Off all sedation. Weaning pain control, pt remains dependent on Dilaudid prn for LE neuropathic pain. PO regimen added and gabapentin increased without benefit, increased again today to 800mg TID. Dilaudid increased in frequency/dose given persistent uncontrolled pain. Pain mgmt team consulted, recs appreciated. Podiatry consulted, possible worsening of right 5th digit that may require podiatric sx. Will CTM for now. No evidence of active infectious process in feet.    Pt with acute combined hypoxemic and hypercapnic respiratory failure with failure to wean from ventilator s/p tracheostomy placement. Pt was tolerating TC QD, PSV (18/10) qhs. Airway clearance therapy in place. Wean O2 supplementation for goal O2 saturation 90-95%. Aspiration precautions and oral hygiene in place. Decannulated at bedside 3/28, continues to use Bilevel NIV qhs. Serial ABGs wnl.     Pt initially p/w RV failure 2/2 pulmHTN now s/p aggressive diuresis with improvement. Now transitioned to PO diuretics with goal to maintain euvolemia. Acetazolamide added today. AFib well controlled conservatively, will continue to monitor on telemetry. TTE 2/25 with new RVE, 3/11 with RVSD. DVT noted on repeat duplex of the right IJ and bilateral peroneal veins. Will c/w full A/C on Eliquis for DVT and AFib.     Pt with oropharyngeal dysphagia s/p PEG placement (3/7) now transitioned to PO diet (3/20) via FEEST. Will continue to monitor on regular PO diet. Bowel regimen in place prn. PPI ppx. Transaminitis improving. Pt with newly diagnosed hepatomegaly.     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 TTE (-) endocarditis. Wound care team following. ID recs appreciated. Further found to have worsening sacral ulceration with induration, CT A/P and MRI spine performed without evidence of underlying tract or drainable fluid collection. Now with acute onset fevers, although without new s/s or complaints and with unclear source. Cx resent, empiric abx initiated with improvement but cx NTD. Fever curve downtrending. Wound care team evaluated and is concerned for worsening tunnelling of sacral wound but CT imaging (-) concern for osteomyelitis or occult infectious sources. Will defer further MRI for now.     Dispo pending medical optimization. Pt full code. DVT ppx in place. Palliative consulted, recs appreciated. Will continue to update family and discuss plan of care throughout hospitalization. Dispo planning to acute rehab initiated.

## 2025-04-04 NOTE — PROGRESS NOTE ADULT - SUBJECTIVE AND OBJECTIVE BOX
CHIEF COMPLAINT: Patient is a 39y old  Male who presents with a chief complaint of sob (02 Mar 2025 06:19)      INTERVAL EVENTS: CT CAP done for recurrent fevers pending results     ROS: Seen by bedside during AM rounds     OBJECTIVE:  ICU Vital Signs Last 24 Hrs  T(C): 37.1 (04 Apr 2025 05:35), Max: 37.7 (03 Apr 2025 09:00)  T(F): 98.8 (04 Apr 2025 05:35), Max: 99.8 (03 Apr 2025 09:00)  HR: 113 (04 Apr 2025 07:18) (106 - 119)  BP: 137/81 (04 Apr 2025 05:35) (124/86 - 142/86)  BP(mean): --  ABP: --  ABP(mean): --  RR: 18 (04 Apr 2025 05:35) (18 - 18)  SpO2: 98% (04 Apr 2025 07:18) (96% - 98%)    O2 Parameters below as of 04 Apr 2025 05:35  Patient On (Oxygen Delivery Method): nasal cannula  O2 Flow (L/min): 4            04-03 @ 07:01  -  04-04 @ 07:00  --------------------------------------------------------  IN: 0 mL / OUT: 3100 mL / NET: -3100 mL      CAPILLARY BLOOD GLUCOSE      POCT Blood Glucose.: 107 mg/dL (04 Apr 2025 07:40)      PHYSICAL EXAM:  General: NAD   Neck: Dressing overlying preexisting trach tube stoma  Cards: S1S2 RR, no murmurs   Pulm: Clear . No wheezes.   Abdomen: Morbidly obese abdomen. Soft, NTND. (+) PEG noted, site c/d/i.   Extremities: Mild b/l LE pedal edema. Extremities warm to touch.  Neurology: Awake/alert. 5/5 motor strength b/l UE. Weakness b/l LE. Following commands.   Skin: warm to touch, color appropriate for ethnicity. Refer to RN assessment for further details.        HOSPITAL MEDICATIONS:  MEDICATIONS  (STANDING):  amLODIPine   Tablet 10 milliGRAM(s) Oral daily  apixaban 5 milliGRAM(s) Oral every 12 hours  caspofungin IVPB 70 milliGRAM(s) IV Intermittent every 24 hours  chlorhexidine 2% Cloths 1 Application(s) Topical <User Schedule>  clotrimazole 1% Cream 1 Application(s) Topical two times a day  Dakins Solution - 1/4 Strength 1 Application(s) Topical two times a day  dextrose 50% Injectable 25 Gram(s) IV Push once  dextrose 50% Injectable 12.5 Gram(s) IV Push once  dextrose 50% Injectable 25 Gram(s) IV Push once  furosemide    Tablet 40 milliGRAM(s) Oral two times a day  gabapentin 600 milliGRAM(s) Oral <User Schedule>  gabapentin 800 milliGRAM(s) Oral <User Schedule>  glucagon  Injectable 1 milliGRAM(s) IntraMuscular once  insulin lispro (ADMELOG) corrective regimen sliding scale   SubCutaneous three times a day before meals  insulin lispro (ADMELOG) corrective regimen sliding scale   SubCutaneous at bedtime  lidocaine   4% Patch 1 Patch Transdermal daily  lidocaine   4% Patch 1 Patch Transdermal daily  melatonin 3 milliGRAM(s) Oral at bedtime  multivitamin 1 Tablet(s) Oral daily  mupirocin 2% Ointment 1 Application(s) Topical two times a day  pantoprazole    Tablet 40 milliGRAM(s) Oral before breakfast  piperacillin/tazobactam IVPB.. 3.375 Gram(s) IV Intermittent every 8 hours  polyethylene glycol 3350 17 Gram(s) Oral daily  QUEtiapine 25 milliGRAM(s) Oral at bedtime  senna 2 Tablet(s) Oral at bedtime  vancomycin  IVPB 1250 milliGRAM(s) IV Intermittent every 8 hours    MEDICATIONS  (PRN):  acetaminophen     Tablet .. 650 milliGRAM(s) Oral every 6 hours PRN Temp greater or equal to 38C (100.4F), Mild Pain (1 - 3), Moderate Pain (4 - 6)  albuterol/ipratropium for Nebulization 3 milliLiter(s) Nebulizer every 6 hours PRN Shortness of Breath and/or Wheezing  dextrose Oral Gel 15 Gram(s) Oral once PRN Blood Glucose LESS THAN 70 milliGRAM(s)/deciliter  HYDROmorphone   Tablet 2 milliGRAM(s) Oral every 6 hours PRN Moderate-severe pain  HYDROmorphone  Injectable 0.5 milliGRAM(s) IV Push daily PRN dressing changes and severe breakthrough      LABS:                        7.8    16.78 )-----------( 365      ( 03 Apr 2025 04:23 )             24.1     04-04    136  |  89[L]  |  5[L]  ----------------------------<  89  3.5   |  28  |  0.76    Ca    8.3[L]      04 Apr 2025 06:20  Phos  4.7     04-04  Mg     1.80     04-04        Urinalysis Basic - ( 04 Apr 2025 06:20 )    Color: x / Appearance: x / SG: x / pH: x  Gluc: 89 mg/dL / Ketone: x  / Bili: x / Urobili: x   Blood: x / Protein: x / Nitrite: x   Leuk Esterase: x / RBC: x / WBC x   Sq Epi: x / Non Sq Epi: x / Bacteria: x

## 2025-04-04 NOTE — PROGRESS NOTE ADULT - ASSESSMENT
This is a 40 y/o M w/ PMHx AF (not on AC), HTN, BEA, pseudotumor cerebri s/p LP in 2024, initially presented to [facility] on 2/24, SOB, LE edema with URI symptoms and sick contacts, noted to have severe ARDS, intubated however still hypoxia, cannulated for VV ECMO on 2/25, transferred to [facility] on 2/25, started on empiric Vanco, Zosyn for multifocal PNA, abdominal wall cellulitis, s/p trach placement by CT surgery on 3/7, ECMO decannulated on 3/7. Zosyn held on 3/9.  C/b fevers on 3/10, restarted on Zosyn, transferred to [facility] on 3/13, Bcx now w/ yeast.    #Candida albicans fungemia   #Fevers   #Multifocal PNA, abdominal wall cellulitis s/p Vanco/Zosyn  #ARDS, hypoxic respiratory failure requiring VV EMCO 2/2 multifocal PNA? s/p decannulation on 3/7    Overall, 40 y/o M w/ PMHx AF (not on AC), HTN, BEA, pseudotumor cerebri s/p LP in 2024 admitted to [facility] on 2/26 for ARDS, hypoxic respiratory failure requiring VV EMCO 2/2 multifocal PNA? s/p decannulation on 3/7, c/b abdominal wall cellulitis s/p Vancomycin/Zosyn, s/p trach on 3/7, in the setting of persistent fevers despite Zosyn, BCx now w/ yeast, Candida albicans   Unclear source for yeast in BCx, pt had all central lines removed on 3/7, not getting TPN, no abdominal pain on exam, PEG site well appearing.     CT A/P w/o clear abdominal source of fungemia, possibly 2/2 abdominal wall cellulitis? TTE w/o IE.  BCx 3/18 1/2 positive, 3/20 NGTD x 2.     Pt with worsening sepsis on 3/30, febrile to 103, WBC 16.   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   Pt with deep tunneled wound to left, no drainage, malodor per wound care.   CT A/P w/ b/l pulmonary opacities but improved, no sacral abscess     Recommendations;   1. C/w Vancomycin decrease dose to 1.25 g q8, Zosyn 3.375 g 8 for now. If Bcx remain negative, will likely stop Vancomycin after weekend   2. Caspofungin 70 mg daily for now, end date 4/2, however given sepsis can c/w for now  3. Can get MRI for possible OM, however given no abscess, this is unlikely to cause acute sepsis     Thank you for consulting us and involving us in the management of this patient's case. In addition to reviewing history, imaging, documents, labs, microbiology, and infection control strategies and potential issues.     ID will continue to follow    Shailesh Owens M.D.  Attending Physician  Division of Infectious Diseases  Department of Medicine    Please contact through MS Teams message.  Office: [phone] (after 5 PM or weekend)

## 2025-04-05 LAB
ANION GAP SERPL CALC-SCNC: 14 MMOL/L — SIGNIFICANT CHANGE UP (ref 7–14)
BUN SERPL-MCNC: 4 MG/DL — LOW (ref 7–23)
CALCIUM SERPL-MCNC: 8.9 MG/DL — SIGNIFICANT CHANGE UP (ref 8.4–10.5)
CHLORIDE SERPL-SCNC: 93 MMOL/L — LOW (ref 98–107)
CO2 SERPL-SCNC: 30 MMOL/L — SIGNIFICANT CHANGE UP (ref 22–31)
CREAT SERPL-MCNC: 0.76 MG/DL — SIGNIFICANT CHANGE UP (ref 0.5–1.3)
CULTURE RESULTS: SIGNIFICANT CHANGE UP
EGFR: 119 ML/MIN/1.73M2 — SIGNIFICANT CHANGE UP
EGFR: 119 ML/MIN/1.73M2 — SIGNIFICANT CHANGE UP
GLUCOSE BLDC GLUCOMTR-MCNC: 103 MG/DL — HIGH (ref 70–99)
GLUCOSE BLDC GLUCOMTR-MCNC: 106 MG/DL — HIGH (ref 70–99)
GLUCOSE BLDC GLUCOMTR-MCNC: 109 MG/DL — HIGH (ref 70–99)
GLUCOSE BLDC GLUCOMTR-MCNC: 112 MG/DL — HIGH (ref 70–99)
GLUCOSE SERPL-MCNC: 91 MG/DL — SIGNIFICANT CHANGE UP (ref 70–99)
HCT VFR BLD CALC: 25.2 % — LOW (ref 39–50)
HGB BLD-MCNC: 7.9 G/DL — LOW (ref 13–17)
MAGNESIUM SERPL-MCNC: 1.9 MG/DL — SIGNIFICANT CHANGE UP (ref 1.6–2.6)
MCHC RBC-ENTMCNC: 27 PG — SIGNIFICANT CHANGE UP (ref 27–34)
MCHC RBC-ENTMCNC: 31.3 G/DL — LOW (ref 32–36)
MCV RBC AUTO: 86 FL — SIGNIFICANT CHANGE UP (ref 80–100)
NRBC # BLD AUTO: 0 K/UL — SIGNIFICANT CHANGE UP (ref 0–0)
NRBC # FLD: 0 K/UL — SIGNIFICANT CHANGE UP (ref 0–0)
NRBC BLD AUTO-RTO: 0 /100 WBCS — SIGNIFICANT CHANGE UP (ref 0–0)
PHOSPHATE SERPL-MCNC: 5 MG/DL — HIGH (ref 2.5–4.5)
PLATELET # BLD AUTO: 410 K/UL — HIGH (ref 150–400)
POTASSIUM SERPL-MCNC: 3.2 MMOL/L — LOW (ref 3.5–5.3)
POTASSIUM SERPL-SCNC: 3.2 MMOL/L — LOW (ref 3.5–5.3)
RBC # BLD: 2.93 M/UL — LOW (ref 4.2–5.8)
RBC # FLD: 23.1 % — HIGH (ref 10.3–14.5)
SODIUM SERPL-SCNC: 137 MMOL/L — SIGNIFICANT CHANGE UP (ref 135–145)
SPECIMEN SOURCE: SIGNIFICANT CHANGE UP
VANCOMYCIN TROUGH SERPL-MCNC: 12.5 UG/ML — SIGNIFICANT CHANGE UP (ref 10–20)
WBC # BLD: 13.99 K/UL — HIGH (ref 3.8–10.5)
WBC # FLD AUTO: 13.99 K/UL — HIGH (ref 3.8–10.5)

## 2025-04-05 PROCEDURE — 99233 SBSQ HOSP IP/OBS HIGH 50: CPT

## 2025-04-05 PROCEDURE — G0545: CPT

## 2025-04-05 RX ORDER — GABAPENTIN 400 MG/1
200 CAPSULE ORAL ONCE
Refills: 0 | Status: COMPLETED | OUTPATIENT
Start: 2025-04-05 | End: 2025-04-05

## 2025-04-05 RX ORDER — HYDROMORPHONE/SOD CHLOR,ISO/PF 2 MG/10 ML
0.5 SYRINGE (ML) INJECTION EVERY 6 HOURS
Refills: 0 | Status: DISCONTINUED | OUTPATIENT
Start: 2025-04-05 | End: 2025-04-10

## 2025-04-05 RX ORDER — GABAPENTIN 400 MG/1
800 CAPSULE ORAL EVERY 8 HOURS
Refills: 0 | Status: DISCONTINUED | OUTPATIENT
Start: 2025-04-05 | End: 2025-04-13

## 2025-04-05 RX ORDER — HYDROMORPHONE/SOD CHLOR,ISO/PF 2 MG/10 ML
4 SYRINGE (ML) INJECTION EVERY 4 HOURS
Refills: 0 | Status: DISCONTINUED | OUTPATIENT
Start: 2025-04-05 | End: 2025-04-09

## 2025-04-05 RX ADMIN — APIXABAN 5 MILLIGRAM(S): 2.5 TABLET, FILM COATED ORAL at 06:05

## 2025-04-05 RX ADMIN — Medication 4 MILLIGRAM(S): at 03:44

## 2025-04-05 RX ADMIN — SODIUM HYPOCHLORITE 1 APPLICATION(S): 0.12 SOLUTION TOPICAL at 17:32

## 2025-04-05 RX ADMIN — Medication 3 MILLIGRAM(S): at 21:58

## 2025-04-05 RX ADMIN — SODIUM HYPOCHLORITE 1 APPLICATION(S): 0.12 SOLUTION TOPICAL at 06:05

## 2025-04-05 RX ADMIN — FUROSEMIDE 40 MILLIGRAM(S): 10 INJECTION INTRAMUSCULAR; INTRAVENOUS at 13:43

## 2025-04-05 RX ADMIN — Medication 1 APPLICATION(S): at 06:13

## 2025-04-05 RX ADMIN — LIDOCAINE HYDROCHLORIDE 1 PATCH: 20 JELLY TOPICAL at 11:51

## 2025-04-05 RX ADMIN — GABAPENTIN 200 MILLIGRAM(S): 400 CAPSULE ORAL at 07:37

## 2025-04-05 RX ADMIN — Medication 0.5 MILLIGRAM(S): at 07:37

## 2025-04-05 RX ADMIN — Medication 40 MILLIEQUIVALENT(S): at 13:43

## 2025-04-05 RX ADMIN — Medication 4 MILLIGRAM(S): at 14:38

## 2025-04-05 RX ADMIN — Medication 4 MILLIGRAM(S): at 21:59

## 2025-04-05 RX ADMIN — Medication 40 MILLIGRAM(S): at 06:03

## 2025-04-05 RX ADMIN — Medication 0.5 MILLIGRAM(S): at 00:21

## 2025-04-05 RX ADMIN — CLOTRIMAZOLE 1 APPLICATION(S): 1 CREAM TOPICAL at 17:33

## 2025-04-05 RX ADMIN — GABAPENTIN 800 MILLIGRAM(S): 400 CAPSULE ORAL at 21:58

## 2025-04-05 RX ADMIN — Medication 40 MILLIEQUIVALENT(S): at 17:31

## 2025-04-05 RX ADMIN — MUPIROCIN CALCIUM 1 APPLICATION(S): 20 CREAM TOPICAL at 17:33

## 2025-04-05 RX ADMIN — Medication 4 MILLIGRAM(S): at 11:51

## 2025-04-05 RX ADMIN — MUPIROCIN CALCIUM 1 APPLICATION(S): 20 CREAM TOPICAL at 06:08

## 2025-04-05 RX ADMIN — CLOTRIMAZOLE 1 APPLICATION(S): 1 CREAM TOPICAL at 06:09

## 2025-04-05 RX ADMIN — Medication 166.67 MILLIGRAM(S): at 22:27

## 2025-04-05 RX ADMIN — Medication 2 TABLET(S): at 21:58

## 2025-04-05 RX ADMIN — GABAPENTIN 600 MILLIGRAM(S): 400 CAPSULE ORAL at 06:11

## 2025-04-05 RX ADMIN — FUROSEMIDE 40 MILLIGRAM(S): 10 INJECTION INTRAMUSCULAR; INTRAVENOUS at 06:05

## 2025-04-05 RX ADMIN — Medication 0.5 MILLIGRAM(S): at 13:43

## 2025-04-05 RX ADMIN — Medication 25 GRAM(S): at 21:58

## 2025-04-05 RX ADMIN — Medication 1 TABLET(S): at 11:51

## 2025-04-05 RX ADMIN — AMLODIPINE BESYLATE 10 MILLIGRAM(S): 10 TABLET ORAL at 06:05

## 2025-04-05 RX ADMIN — QUETIAPINE FUMARATE 25 MILLIGRAM(S): 25 TABLET ORAL at 21:59

## 2025-04-05 RX ADMIN — APIXABAN 5 MILLIGRAM(S): 2.5 TABLET, FILM COATED ORAL at 17:32

## 2025-04-05 RX ADMIN — Medication 166.67 MILLIGRAM(S): at 14:38

## 2025-04-05 RX ADMIN — Medication 4 MILLIGRAM(S): at 17:31

## 2025-04-05 RX ADMIN — GABAPENTIN 800 MILLIGRAM(S): 400 CAPSULE ORAL at 14:15

## 2025-04-05 RX ADMIN — Medication 25 GRAM(S): at 14:15

## 2025-04-05 RX ADMIN — Medication 25 GRAM(S): at 06:07

## 2025-04-05 RX ADMIN — Medication 166.67 MILLIGRAM(S): at 08:08

## 2025-04-05 RX ADMIN — Medication 40 MILLIEQUIVALENT(S): at 09:31

## 2025-04-05 RX ADMIN — CASPOFUNGIN ACETATE 260 MILLIGRAM(S): 5 INJECTION, POWDER, LYOPHILIZED, FOR SOLUTION INTRAVENOUS at 10:54

## 2025-04-05 NOTE — PROGRESS NOTE ADULT - SUBJECTIVE AND OBJECTIVE BOX
Patient is a 39y old  Male who presents with a chief complaint of sob (02 Mar 2025 06:19)    Being followed by ID for fungemia/sepsis     Interval history:  remains on NC 4 L this morning   Denies any chest pain   Reports B/L LE pain and tenderness   No diarrhea   PIV in tact +         Antimicrobials:  caspofungin IVPB 70 milliGRAM(s) IV Intermittent every 24 hours  piperacillin/tazobactam IVPB.. 3.375 Gram(s) IV Intermittent every 8 hours  vancomycin  IVPB 1250 milliGRAM(s) IV Intermittent every 8 hours      Vital Signs Last 24 Hrs  T(C): 36.8 (04-05-25 @ 08:00), Max: 37.2 (04-04-25 @ 16:00)  T(F): 98.2 (04-05-25 @ 08:00), Max: 99 (04-04-25 @ 16:00)  HR: 117 (04-05-25 @ 08:00) (109 - 120)  BP: 145/90 (04-05-25 @ 08:00) (140/85 - 152/93)  BP(mean): --  RR: 25 (04-05-25 @ 08:00) (20 - 25)  SpO2: 96% (04-05-25 @ 08:00) (94% - 97%)    PHYSICAL EXAM:  GENERAL: NAD  HEAD:  Atraumatic, Normocephalic  EYES: EOMI, conjunctiva and sclera clear  CHEST/LUNG: On NC, CTAB  HEART: RRR  ABDOMEN: Morbidly obese abdomen. Soft, NTND. (+) PEG   Extremities: B/l feet wrapped, tender to palpation    PSYCH: AAOx3    Lab Data:                        7.9    13.99 )-----------( 410      ( 05 Apr 2025 06:10 )             25.2     04-05    137  |  93[L]  |  4[L]  ----------------------------<  91  3.2[L]   |  30  |  0.76    Ca    8.9      05 Apr 2025 06:10  Phos  5.0     04-05  Mg     1.90     04-05        Clean Catch Clean Catch (Midstream)  03-31-25   <10,000 CFU/mL Normal Urogenital Keysha  --  --      Blood Blood-Peripheral  03-30-25   No growth at 4 days  --  --              Vancomycin Level, Trough: 12.5 ug/mL (04-05-25 @ 06:10)      RADIOLOGY:  below reviewed        ACC: [id number] EXAM:  CT ABDOMEN AND PELVIS IC   ORDERED BY: SHAUNA ABDI     ACC: [id number] EXAM:  CT CHEST IC   ORDERED BY: SHAUNA ABDI     PROCEDURE DATE:  04/03/2025          INTERPRETATION:  CLINICAL INFORMATION: Fever and leukocytosis. Evaluated   sacral wound for osteomyelitis.    COMPARISON: CT chest abdomen pelvis 3/17/2023    CONTRAST/COMPLICATIONS:  IV Contrast: Omnipaque 350  140 cc administered   60 cc discarded  Oral Contrast: NONE.    PROCEDURE:  CT of the Chest, Abdomen and Pelvis was performed.  Sagittal and coronal reformats were performed.    FINDINGS:  CHEST:  LUNGS AND LARGE AIRWAYS: Patent central airways. Bilateral lung patchy   opacities and lower lung consolidation, mildly decreased.  PLEURA: No pleural effusion.  VESSELS: Within normal limits.  HEART: Heart size is normal. No pericardial effusion.  MEDIASTINUM AND KENDRA: No lymphadenopathy.  CHEST WALL AND LOWER NECK: Within normal limits.    ABDOMEN AND PELVIS:  LIVER: Hepatomegaly.  BILE DUCTS: Normal caliber.  GALLBLADDER: Within normal limits.  SPLEEN: Within normal limits.  PANCREAS: Within normal limits.  ADRENALS: Within normal limits.  KIDNEYS/URETERS: Within normal limits.    BLADDER: Within normal limits.  REPRODUCTIVE ORGANS: Prostate within normal limits.    BOWEL: Gastrostomy tube in the stomach. No bowel obstruction. Appendix is   normal.  PERITONEUM/RETROPERITONEUM: Within normal limits.  VESSELS: Within normal limits.  LYMPH NODES: No lymphadenopathy.  ABDOMINAL WALL: Small fat containing umbilical hernia. Limited   field-of-view of the sacral soft tissues due to body habitus. Visualized   soft tissues demonstrating subcutaneous fat stranding and small droplets   of air. No drainable fluid collection is seen.  BONES: Mild degenerative changes.    IMPRESSION:  Bilateral lung patchy opacities and consolidation, mildly decreased from   prior exam.    Limited field-of-view of the sacral soft tissues due to body habitus.   Visualized soft tissues demonstrating subcutaneous fat stranding and   small droplets of air. No drainable fluid collection is seen.    --- End of Report ---

## 2025-04-05 NOTE — PROGRESS NOTE ADULT - ASSESSMENT
This is a 40 y/o M w/ PMHx AF (not on AC), HTN, BEA, pseudotumor cerebri s/p LP in 2024, initially presented to [place] on 2/24, SOB, LE edema with URI symptoms and sick contacts, noted to have severe ARDS, intubated however still hypoxia, cannulated for VV ECMO on 2/25, transferred to [facility] on 2/25, started on empiric Vanco, Zosyn for multifocal PNA, abdominal wall cellulitis, s/p trach placement by CT surgery on 3/7, ECMO decannulated on 3/7. Zosyn held on 3/9.  C/b fevers on 3/10, restarted on Zosyn, transferred to [facility] on 3/13, Bcx now w/ yeast.    #Candida albicans fungemia   #Fevers   #Multifocal PNA, abdominal wall cellulitis s/p Vanco/Zosyn  #ARDS, hypoxic respiratory failure requiring VV EMCO 2/2 multifocal PNA? s/p decannulation on 3/7    Overall, 40 y/o M w/ PMHx AF (not on AC), HTN, BEA, pseudotumor cerebri s/p LP in 2024 admitted to [facility] on 2/26 for ARDS, hypoxic respiratory failure requiring VV EMCO 2/2 multifocal PNA? s/p decannulation on 3/7, c/b abdominal wall cellulitis s/p Vancomycin/Zosyn, s/p trach on 3/7, in the setting of persistent fevers despite Zosyn, BCx now w/ yeast, Candida albicans   Unclear source for yeast in BCx, pt had all central lines removed on 3/7, not getting TPN, no abdominal pain on exam, PEG site well appearing.     CT A/P w/o clear abdominal source of fungemia, possibly 2/2 abdominal wall cellulitis? TTE w/o IE.  BCx 3/18 1/2 positive, 3/20 NGTD x 2.     Pt with worsening sepsis on 3/30, febrile to 103, WBC 16.   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   Pt with deep tunneled wound to left, no drainage, malodor per wound care.   CT A/P 04/03 w/ b/l pulmonary opacities but improved, no sacral abscess     Recommendations;   1. C/w Vancomycin 1.25 g q8, Zosyn 3.375 g 8 for now. If Bcx remain negative, will likely stop Vancomycin after weekend   2. Caspofungin 70 mg daily for now, end date 4/2, however given sepsis can c/w for now  3. Can get MRI for possible OM, however given no abscess, this is unlikely to cause acute sepsis     In addition to reviewing history, imaging, documents, labs, microbiology, took into account antibiotic stewardship, local antibiogram and infection control strategies and potential transmission issues.    Discussed with the primary team.    Marta Fernandes MD  Attending Physician, Division of Infectious Diseases  Department of Medicine   [facility]    Contact on TEAMS messaging from 9am - 5pm  Office: [phone] (after 5 PM or weekend)

## 2025-04-05 NOTE — PROGRESS NOTE ADULT - ASSESSMENT
40 YO M with PMHx of morbid obesity, BEA on CPAP, pAFIB (not on AC), HTN, and BL papilledema attributed to pseudotumor cerebri who presented initially to [facility] on 2/24 with SOB and BL LE edema. Found with URI outpatient with progressive SOB, and concern for noted for MFPNA on imaging. Course progressed with AHRF with worsening O2 demand, respiratory distress, secretions, and somnolence requiring intubation (difficult with success after 6 attempts) in ED and admission to [facility] MICU. While in MICU, patient noted with increasing O2 demand with no improvement despite optimal vent management. Concern noted for progressive ARDS, unable to prone given morbid obesity, and ultimately cannulated for vvECMO on 2/25 and transferred to [facility] for further management on 2/26. While at [facility], tx with diuresis and ABX, and ultimately decannulated and s/p 60XLTCP trach and PEG on 3/7. Patient ultimately transferred to [facility] on 3/11 and course complicated by recurrent high grade fevers and found with fungemia.    NEUROLOGY  # Hx of Pseudotumor Cerebri   - s/p LP in 2024 with high opening pressure  - s/p MRI 2024 with possible pituitary mass but unclear because of pressure effect from pseudotumor cerebri causing partially empty sella.  - Prolactin elevated   - ACTH low but recieved steroids during admission   - FT4 low normal and TSH normal, but given FT4 low normal TSH should be higher   - Discuss endocrine consult for inhouse work up vs outpatient.     # Sedation   - Weaned off sedation in ICU   - Nimbex turned off 2/27   - Prop turned off 3/9   - Precedex turned off and catapres started 3/11  - Continue on catapres 0.2 TID (decreased 3/25) - taper done 3/31 to 0.1 tid - discontinued 4/3     # Insomnia   - Patient worked night shift x 15 years   - Trouble sleeping at night leading to day time sleepiness and poor participation with PT  - Continue on Seroquel 25 QHS (increased 3/18)   - Continue on melatonin   - Continue on neurontin as below    # BL LE neuropathy   - CIPN concern   - Neurontin increased to 300mg Q8H with relief   - added Percocet Q4H for moderate pain (3/25) and continue on Dilaudid Q6H for severe pain   - Pain mgt called to consult - with recs for tylenol ATC, NEurontin increased to 400mg q6, Oxycodone 5 q4h prn , Dilaudid for BREAKTHROUGH pain only, Lidocaine patches on each leg, ice prn to feet  - Some improvement in pain - PT consult for TENS,   - oxycodone inc to 10mg as pt with no relief /buttocks area indurated /tender CT ordered -no abcess  - C/w Gabapentin : 600/600/800 and uptitrate as required  - Switched to PO Dilaudid 2mg Q6H PRN moderate-severe and IV Dilaudid PRN severe breakthrough and with dressing changes   - Still with severe pain 10/10 despite change to dilaudid will increase to 4mg q4h prn with iv dilaudid breakthrough  - Neurontin increased to 800mg q8h  - I have reviewed with pt to not wait to long for dose of pain medication ot prevent pain from escalating     CARDIOVASCULAR  # HTN   - HTN noted in ICU requiring cardene and esmolol GTTs   - Lisinopril dc'ed to increase catapres   - Continue on norvasc 10 and catapres 0.3 TID   - Monitor BP     # AFIB   - Hx of pAFIB   - No RVR episodes or pAFIB episodes in ICU   - No rate control medications needed  - Monitor on telemetry     # RV dilation second to PHTN   - POCUS on arrival to [facility] with RVE concern   - TTE 10/2024 with EF 55-60 with normal LVSF, moderate LVH and normal RVSF and size with no concern for pHTN   - TTE on 2/25 at  with EF 61 and normal LVSF, but enlarged RV size with reduced RVSF, mild TR, mild PHTN with PASP 49, and dilated IVC to 3.4cn, but normal TAPSE 2.1.  - Continue on aggressive diuresis in ICU    - TTE 3/11 with RVSF reduced with TAPSE 1.6. IVC improved to 2.12cm.   - Continue on lasix 40 PO BID   - Monitor IO/BMP    RESPIRATORY  # ARDS second to MFPNA   - Hx of BEA on CPAP presented to  post URI with SOB and found with AHRF with progression to ARDS second to post viral MFPNA   - Intubated 2/25   - Cannulated for vvECMO 2/26   - s/p 60XLTCP tracheostomy 3/7   - Decannulated 3/7   - CTSx removed tracheostomy and ECMO sutures on 3/17   - Continue on TC QD with PMV as able with strong phonation  - Continue on PS 18/10/40 QHS given OHS   - Continue on nebs and HTS   -  trials continue and now trialing overnight with BIPAP for OHS (10/8/12)  - Decannulated 3/28   - Using nasal cannula 4L     GI  # Dysphagia   - s/p PEG 3/7   - Attempted green dye on 3/13 and failed  - Continue on TF  - Continue on miralax and senna  - RPT green dye passed 3/19  - 3/20 Passed FEEST - on soft bite sized with thin liquids     # Mild transaminitis   - LFTs elevated in ICU with TBILI elevated likely from ECMO hemolysis   - US ABD with hepatic steatosis   - Trend LFTs      RENAL  # ELLA   - ELLA likely from cardiorenal with RVE   - Diuresed in ICU and improved   - Monitor renal function and UOP     # Hypernatremia   - Hypernatremia with fever spikes   - Fever curve improved and attempting to wean FWF   - Continue on  Q6H (decreased 3/19)    INFECTIOUS DISEASE  # Recurrent fevers with fungemia from unknown source   - Recurrent fevers post ECMO decannulation thought initially to be cytokine surge   - BCx, SCx, UCx, RVP and MRSA RPT on 3/12 negative   - Completed zosyn (3/12-3/18) empirically with improved WBC , however recurrent fevers noted.   - RPT SCx, UA, CXR and RVP negative  - TTE 3/17 with no IE  - PanCT 3/17 with PNA and persistent panniculitis   - BCx 3/15 and 3/16 with candida albicans.   - Concern for possible source from panniculitis   - Continue on caspo (3/17 - ) and RPT BCx 3/18 negative  - ID following  - plan for 2 week course after negative BCx to 4/2  - 3/20 Bcx- NGTD  - 3/30 spiked temp cx's sent + ua and c/s pending Bcx neg to date - started on vanco /zosyn ID following   - 4/3 - persistent fevers, leukocytosis. Will repeat CT C/A/P to evaluate for source - no source found- Bilat lung opacities mildly decreased         # MFPNA and abdominal pannus cellulitis   - Presented to  post URI with SOB and found with AHRF with progression to ARDS second to post viral MFPNA   - RVP, legionella, strept, BAL, BCx, UCx, HIV, PCP, and adenovirus PCR negative   - Initially started on multiple agents in ICU and ultimatelty completed zosyn (2/26-3/9) ZMAX (2/25-2/26) and vanco (2/26-3/1)     # R/o Sacral OM  - Noted with hard indurated sacral wound by Wound Care Team  - CT Pelvis (3/29) revaeling superficial skin thickening overlying the sacrum with underlying edema of the subcutaneous fat, in keeping with the clinical history of sacral wound. No evidence of underlying tract or drainable fluid collection.  - CT 4/3 - no fluid collection in sacral tissue     # Penile discharge   - GC/ chlamydia negative   - HIV negative   - Monitor for now    # PPX   - MRSA PCR positive and completed bactroban (2/26-3/3)     HEME   # Anemia likely second to ECMO circuit vs AOCD   - HH low in ICU second to ECMO circuit   - HH dropping again today however no bleeding noted   - Microcytic and hemochromic, sent anemia panel 3/19  - Trend HH     VASCULAR   # R IJ and BL LE DVTs   - Post ECMO dopplers on 3/9 negative for BL UE/ LE DVTs.   - Subsequent post ECMO dopplers performed on 3/14 and noted with acute, non-occlusive deep vein thrombosis noted within the right internal jugular vein. acute, occlusive deep vein thromboses noted within the right and left peroneal veins at both mid calves, and age-indeterminate, occlusive deep vein thrombosis visualized within the left gastrocnemius vein at the proximal calf.     - Continue on argatroban GTT and change to eliquis     PODIATRY   # BL plantar feet blisters likely pressors induced  - Seen by podiatry and blisters lanced on 3/4 and again on 3/18  - Continue on local wound care per podiatry   - Podiatry following  - OK for WBAT will fu with PT for offloading shoe if appropriate  - Pain mgt consult   - Podiatry FU 3/28 / 4/3 done /following     ENDOCRINE  # Pre-DM2   - A1C 6.7  - Continue on ISS   - Monitor FS   - IF no demand then stop ISS     LINES/ TUBES  - R IJ and R FEM ECMO (2/26-3/7)     SKIN  # Pannus canndial intertrigo   # Sacrum/ buttock MAD  - Continue on clotrimazole cream   - WOC appreciated   -Seen by WC pt noted to have oozing from Buttock wound surgicel placed by wound care On follow up no further bleeding  - Wound care placed orders   - No bleeding from wound     ETHICS/ GOC    - FULL CODE     DISPO - PT following  Seen by PMR for acute rehab per recs   40 YO M with PMHx of morbid obesity, BEA on CPAP, pAFIB (not on AC), HTN, and BL papilledema attributed to pseudotumor cerebri who presented initially to [facility] on 2/24 with SOB and BL LE edema. Found with URI outpatient with progressive SOB, and concern for noted for MFPNA on imaging. Course progressed with AHRF with worsening O2 demand, respiratory distress, secretions, and somnolence requiring intubation (difficult with success after 6 attempts) in ED and admission to [facility] MICU. While in MICU, patient noted with increasing O2 demand with no improvement despite optimal vent management. Concern noted for progressive ARDS, unable to prone given morbid obesity, and ultimately cannulated for vvECMO on 2/25 and transferred to [facility] for further management on 2/26. While at [facility], tx with diuresis and ABX, and ultimately decannulated and s/p 60XLTCP trach and PEG on 3/7. Patient ultimately transferred to [facility] on 3/11 and course complicated by recurrent high grade fevers and found with fungemia.    NEUROLOGY  # Hx of Pseudotumor Cerebri   - s/p LP in 2024 with high opening pressure  - s/p MRI 2024 with possible pituitary mass but unclear because of pressure effect from pseudotumor cerebri causing partially empty sella.  - Prolactin elevated   - ACTH low but recieved steroids during admission   - FT4 low normal and TSH normal, but given FT4 low normal TSH should be higher   - Discuss endocrine consult for inhouse work up vs outpatient.     # Sedation   - Weaned off sedation in ICU   - Nimbex turned off 2/27   - Prop turned off 3/9   - Precedex turned off and catapres started 3/11  - Continue on catapres 0.2 TID (decreased 3/25) - taper done 3/31 to 0.1 tid - discontinued 4/3     # Insomnia   - Patient worked night shift x 15 years   - Trouble sleeping at night leading to day time sleepiness and poor participation with PT  - Continue on Seroquel 25 QHS (increased 3/18)   - Continue on melatonin   - Continue on neurontin as below    # BL LE neuropathy   - CIPN concern   - Neurontin increased to 300mg Q8H with relief   - added Percocet Q4H for moderate pain (3/25) and continue on Dilaudid Q6H for severe pain   - Pain mgt called to consult - with recs for tylenol ATC, NEurontin increased to 400mg q6, Oxycodone 5 q4h prn , Dilaudid for BREAKTHROUGH pain only, Lidocaine patches on each leg, ice prn to feet  - Some improvement in pain - PT consult for TENS,   - oxycodone inc to 10mg as pt with no relief /buttocks area indurated /tender CT ordered -no abcess  - C/w Gabapentin : 600/600/800 and uptitrate as required  - Switched to PO Dilaudid 2mg Q6H PRN moderate-severe and IV Dilaudid PRN severe breakthrough and with dressing changes   - Still with severe pain 10/10 despite change to dilaudid will increase to 4mg q4h prn with iv dilaudid breakthrough  - Neurontin increased to 800mg q8h Monitor neuro status/lethergy   - I have reviewed with pt to not wait to long for dose of pain medication to  prevent pain from escalating     CARDIOVASCULAR  # HTN   - HTN noted in ICU requiring cardene and esmolol GTTs   - Lisinopril dc'ed to increase catapres   - Continue on norvasc 10 and catapres 0.3 TID   - Monitor BP     # AFIB   - Hx of pAFIB   - No RVR episodes or pAFIB episodes in ICU   - No rate control medications needed  - Monitor on telemetry     # RV dilation second to PHTN   - POCUS on arrival to [facility] with RVE concern   - TTE 10/2024 with EF 55-60 with normal LVSF, moderate LVH and normal RVSF and size with no concern for pHTN   - TTE on 2/25 at  with EF 61 and normal LVSF, but enlarged RV size with reduced RVSF, mild TR, mild PHTN with PASP 49, and dilated IVC to 3.4cn, but normal TAPSE 2.1.  - Continue on aggressive diuresis in ICU    - TTE 3/11 with RVSF reduced with TAPSE 1.6. IVC improved to 2.12cm.   - Continue on lasix 40 PO BID   - Monitor IO/BMP    RESPIRATORY  # ARDS second to MFPNA   - Hx of BEA on CPAP presented to  post URI with SOB and found with AHRF with progression to ARDS second to post viral MFPNA   - Intubated 2/25   - Cannulated for vvECMO 2/26   - s/p 60XLTCP tracheostomy 3/7   - Decannulated 3/7   - CTSx removed tracheostomy and ECMO sutures on 3/17   - Continue on TC QD with PMV as able with strong phonation  - Continue on PS 18/10/40 QHS given OHS   - Continue on nebs and HTS   -  trials continue and now trialing overnight with BIPAP for OHS (10/8/12)  - Decannulated 3/28   - Using nasal cannula 4L     GI  # Dysphagia   - s/p PEG 3/7   - Attempted green dye on 3/13 and failed  - Continue on TF  - Continue on miralax and senna  - RPT green dye passed 3/19  - 3/20 Passed FEEST - on soft bite sized with thin liquids Added consistent carb 2/2 weight/Elevated A1c     # Mild transaminitis   - LFTs elevated in ICU with TBILI elevated likely from ECMO hemolysis   - US ABD with hepatic steatosis   - Trend LFTs      RENAL  # ELLA   - ELLA likely from cardiorenal with RVE   - Diuresed in ICU and improved   - Monitor renal function and UOP     # Hypernatremia   - Hypernatremia with fever spikes   - Fever curve improved and attempting to wean FWF   - Continue on  Q6H (decreased 3/19)    INFECTIOUS DISEASE  # Recurrent fevers with fungemia from unknown source   - Recurrent fevers post ECMO decannulation thought initially to be cytokine surge   - BCx, SCx, UCx, RVP and MRSA RPT on 3/12 negative   - Completed zosyn (3/12-3/18) empirically with improved WBC , however recurrent fevers noted.   - RPT SCx, UA, CXR and RVP negative  - TTE 3/17 with no IE  - PanCT 3/17 with PNA and persistent panniculitis   - BCx 3/15 and 3/16 with candida albicans.   - Concern for possible source from panniculitis   - Continue on caspo (3/17 - ) and RPT BCx 3/18 negative  - ID following  - plan for 2 week course after negative BCx to 4/2  - 3/20 Bcx- NGTD  - 3/30 spiked temp cx's sent + ua and c/s pending Bcx neg to date - started on vanco /zosyn ID following   - 4/3 - persistent fevers, leukocytosis. Will repeat CT C/A/P to evaluate for source - no source found- Bilat lung opacities mildly decreased         # MFPNA and abdominal pannus cellulitis   - Presented to  post URI with SOB and found with AHRF with progression to ARDS second to post viral MFPNA   - RVP, legionella, strept, BAL, BCx, UCx, HIV, PCP, and adenovirus PCR negative   - Initially started on multiple agents in ICU and ultimatelty completed zosyn (2/26-3/9) ZMAX (2/25-2/26) and vanco (2/26-3/1)     # R/o Sacral OM  - Noted with hard indurated sacral wound by Wound Care Team  - CT Pelvis (3/29) revaeling superficial skin thickening overlying the sacrum with underlying edema of the subcutaneous fat, in keeping with the clinical history of sacral wound. No evidence of underlying tract or drainable fluid collection.  - CT 4/3 - no fluid collection in sacral tissue     # Penile discharge   - GC/ chlamydia negative   - HIV negative   - Monitor for now    # PPX   - MRSA PCR positive and completed bactroban (2/26-3/3)     HEME   # Anemia likely second to ECMO circuit vs AOCD   - HH low in ICU second to ECMO circuit   - HH dropping again today however no bleeding noted   - Microcytic and hemochromic, sent anemia panel 3/19  - Trend      VASCULAR   # R IJ and BL LE DVTs   - Post ECMO dopplers on 3/9 negative for BL UE/ LE DVTs.   - Subsequent post ECMO dopplers performed on 3/14 and noted with acute, non-occlusive deep vein thrombosis noted within the right internal jugular vein. acute, occlusive deep vein thromboses noted within the right and left peroneal veins at both mid calves, and age-indeterminate, occlusive deep vein thrombosis visualized within the left gastrocnemius vein at the proximal calf.     - Continue on argatroban GTT and change to eliquis     PODIATRY   # BL plantar feet blisters likely pressors induced  - Seen by podiatry and blisters lanced on 3/4 and again on 3/18  - Continue on local wound care per podiatry   - Podiatry following  - OK for WBAT will fu with PT for offloading shoe if appropriate  - Pain mgt consult   - Podiatry FU 3/28 / 4/3 done /following     ENDOCRINE  # Pre-DM2   - A1C 6.7  - Continue on ISS   - Monitor FS   - IF no demand then stop ISS     LINES/ TUBES  - R IJ and R FEM ECMO (2/26-3/7)     SKIN  # Pannus canndial intertrigo   # Sacrum/ buttock MAD  - Continue on clotrimazole cream   - WOC appreciated   -Seen by WC pt noted to have oozing from Buttock wound surgicel placed by wound care On follow up no further bleeding  - Wound care placed orders   - No bleeding from wound     ETHICS/ GOC    - FULL CODE     DISPO - PT following  Seen by PMR for acute rehab per recs   40 YO M with PMHx of morbid obesity, BEA on CPAP, pAFIB (not on AC), HTN, and BL papilledema attributed to pseudotumor cerebri who presented initially to [facility] on 2/24 with SOB and BL LE edema. Found with URI outpatient with progressive SOB, and concern for noted for MFPNA on imaging. Course progressed with AHRF with worsening O2 demand, respiratory distress, secretions, and somnolence requiring intubation (difficult with success after 6 attempts) in ED and admission to [facility] MICU. While in MICU, patient noted with increasing O2 demand with no improvement despite optimal vent management. Concern noted for progressive ARDS, unable to prone given morbid obesity, and ultimately cannulated for vvECMO on 2/25 and transferred to [facility] for further management on 2/26. While at [facility], tx with diuresis and ABX, and ultimately decannulated and s/p 60XLTCP trach and PEG on 3/7. Patient ultimately transferred to [facility] on 3/11 and course complicated by recurrent high grade fevers and found with fungemia.    NEUROLOGY  # Hx of Pseudotumor Cerebri   - s/p LP in 2024 with high opening pressure  - s/p MRI 2024 with possible pituitary mass but unclear because of pressure effect from pseudotumor cerebri causing partially empty sella.  - Prolactin elevated   - ACTH low but recieved steroids during admission   - FT4 low normal and TSH normal, but given FT4 low normal TSH should be higher   - Discuss endocrine consult for inhouse work up vs outpatient.     # Sedation   - Weaned off sedation in ICU   - Nimbex turned off 2/27   - Prop turned off 3/9   - Precedex turned off and catapres started 3/11  - Continue on catapres 0.2 TID (decreased 3/25) - taper done 3/31 to 0.1 tid - discontinued 4/3     # Insomnia   - Patient worked night shift x 15 years   - Trouble sleeping at night leading to day time sleepiness and poor participation with PT  - Continue on Seroquel 25 QHS (increased 3/18)   - Continue on melatonin   - Continue on neurontin as below    # BL LE neuropathy   - CIPN concern   - Neurontin increased to 300mg Q8H with relief   - added Percocet Q4H for moderate pain (3/25) and continue on Dilaudid Q6H for severe pain   - Pain mgt called to consult - with recs for tylenol ATC, NEurontin increased to 400mg q6, Oxycodone 5 q4h prn , Dilaudid for BREAKTHROUGH pain only, Lidocaine patches on each leg, ice prn to feet  - Some improvement in pain - PT consult for TENS,   - oxycodone inc to 10mg as pt with no relief /buttocks area indurated /tender CT ordered -no abcess  - C/w Gabapentin : 600/600/800 and uptitrate as required  - Switched to PO Dilaudid 2mg Q6H PRN moderate-severe and IV Dilaudid PRN severe breakthrough and with dressing changes   - Still with severe pain 10/10 despite change to dilaudid will increase to 4mg q4h prn with iv dilaudid breakthrough  - Neurontin increased to 800mg q8h Monitor neuro status/lethergy   - I have reviewed with pt to not wait to long for dose of pain medication to  prevent pain from escalating   -Meds reviewed despite being in constant pain pt not taking dilaudid as often as he can often with 10/10 pain DIlaudid will be every 4 hours with parameters and pt can refuse Will reconsult pain mgt again for any recommendations on monday     CARDIOVASCULAR  # HTN   - HTN noted in ICU requiring cardene and esmolol GTTs   - Lisinopril dc'ed to increase catapres   - Continue on norvasc 10 and catapres 0.3 TID   - Monitor BP     # AFIB   - Hx of pAFIB   - No RVR episodes or pAFIB episodes in ICU   - No rate control medications needed  - Monitor on telemetry     # RV dilation second to PHTN   - POCUS on arrival to [facility] with RVE concern   - TTE 10/2024 with EF 55-60 with normal LVSF, moderate LVH and normal RVSF and size with no concern for pHTN   - TTE on 2/25 at  with EF 61 and normal LVSF, but enlarged RV size with reduced RVSF, mild TR, mild PHTN with PASP 49, and dilated IVC to 3.4cn, but normal TAPSE 2.1.  - Continue on aggressive diuresis in ICU    - TTE 3/11 with RVSF reduced with TAPSE 1.6. IVC improved to 2.12cm.   - Continue on lasix 40 PO BID   - Monitor IO/BMP    RESPIRATORY  # ARDS second to MFPNA   - Hx of BEA on CPAP presented to  post URI with SOB and found with AHRF with progression to ARDS second to post viral MFPNA   - Intubated 2/25   - Cannulated for vvECMO 2/26   - s/p 60XLTCP tracheostomy 3/7   - Decannulated 3/7   - CTSx removed tracheostomy and ECMO sutures on 3/17   - Continue on TC QD with PMV as able with strong phonation  - Continue on PS 18/10/40 QHS given OHS   - Continue on nebs and HTS   -  trials continue and now trialing overnight with BIPAP for OHS (10/8/12)  - Decannulated 3/28   - Using nasal cannula 4L     GI  # Dysphagia   - s/p PEG 3/7   - Attempted green dye on 3/13 and failed  - Continue on TF  - Continue on miralax and senna  - RPT green dye passed 3/19  - 3/20 Passed FEEST - on soft bite sized with thin liquids Added consistent carb 2/2 weight/Elevated A1c     # Mild transaminitis   - LFTs elevated in ICU with TBILI elevated likely from ECMO hemolysis   - US ABD with hepatic steatosis   - Trend LFTs      RENAL  # ELLA   - ELLA likely from cardiorenal with RVE   - Diuresed in ICU and improved   - Monitor renal function and UOP     # Hypernatremia   - Hypernatremia with fever spikes   - Fever curve improved and attempting to wean FWF   - Continue on  Q6H (decreased 3/19)    INFECTIOUS DISEASE  # Recurrent fevers with fungemia from unknown source   - Recurrent fevers post ECMO decannulation thought initially to be cytokine surge   - BCx, SCx, UCx, RVP and MRSA RPT on 3/12 negative   - Completed zosyn (3/12-3/18) empirically with improved WBC , however recurrent fevers noted.   - RPT SCx, UA, CXR and RVP negative  - TTE 3/17 with no IE  - PanCT 3/17 with PNA and persistent panniculitis   - BCx 3/15 and 3/16 with candida albicans.   - Concern for possible source from panniculitis   - Continue on caspo (3/17 - ) and RPT BCx 3/18 negative  - ID following  - plan for 2 week course after negative BCx to 4/2  - 3/20 Bcx- NGTD  - 3/30 spiked temp cx's sent + ua and c/s pending Bcx neg to date - started on vanco /zosyn ID following   - 4/3 - persistent fevers, leukocytosis. Will repeat CT C/A/P to evaluate for source - no source found- Bilat lung opacities mildly decreased         # MFPNA and abdominal pannus cellulitis   - Presented to  post URI with SOB and found with AHRF with progression to ARDS second to post viral MFPNA   - RVP, legionella, strept, BAL, BCx, UCx, HIV, PCP, and adenovirus PCR negative   - Initially started on multiple agents in ICU and ultimatelty completed zosyn (2/26-3/9) ZMAX (2/25-2/26) and vanco (2/26-3/1)     # R/o Sacral OM  - Noted with hard indurated sacral wound by Wound Care Team  - CT Pelvis (3/29) revaeling superficial skin thickening overlying the sacrum with underlying edema of the subcutaneous fat, in keeping with the clinical history of sacral wound. No evidence of underlying tract or drainable fluid collection.  - CT 4/3 - no fluid collection in sacral tissue     # Penile discharge   - GC/ chlamydia negative   - HIV negative   - Monitor for now    # PPX   - MRSA PCR positive and completed bactroban (2/26-3/3)     HEME   # Anemia likely second to ECMO circuit vs AOCD   - HH low in ICU second to ECMO circuit   - HH dropping again today however no bleeding noted   - Microcytic and hemochromic, sent anemia panel 3/19  - Trend      VASCULAR   # R IJ and BL LE DVTs   - Post ECMO dopplers on 3/9 negative for BL UE/ LE DVTs.   - Subsequent post ECMO dopplers performed on 3/14 and noted with acute, non-occlusive deep vein thrombosis noted within the right internal jugular vein. acute, occlusive deep vein thromboses noted within the right and left peroneal veins at both mid calves, and age-indeterminate, occlusive deep vein thrombosis visualized within the left gastrocnemius vein at the proximal calf.     - Continue on argatroban GTT and change to eliquis     PODIATRY   # BL plantar feet blisters likely pressors induced  - Seen by podiatry and blisters lanced on 3/4 and again on 3/18  - Continue on local wound care per podiatry   - Podiatry following  - OK for WBAT will fu with PT for offloading shoe if appropriate  - Pain mgt consult   - Podiatry FU 3/28 / 4/3 done /following     ENDOCRINE  # Pre-DM2   - A1C 6.7  - Continue on ISS   - Monitor FS   - IF no demand then stop ISS     LINES/ TUBES  - R IJ and R FEM ECMO (2/26-3/7)     SKIN  # Pannus canndial intertrigo   # Sacrum/ buttock MAD  - Continue on clotrimazole cream   - WOC appreciated   -Seen by WC pt noted to have oozing from Buttock wound surgicel placed by wound care On follow up no further bleeding  - Wound care placed orders   - No bleeding from wound     ETHICS/ GOC    - FULL CODE     DISPO - PT following  Seen by PMR for acute rehab per recs

## 2025-04-05 NOTE — PROGRESS NOTE ADULT - SUBJECTIVE AND OBJECTIVE BOX
CHIEF COMPLAINT: Patient is a 39y old  Male who presents with a chief complaint of sob (02 Mar 2025 06:19)      INTERVAL EVENTS: Continues with breakthrough pain     ROS: Seen by bedside during AM rounds     OBJECTIVE:  ICU Vital Signs Last 24 Hrs  T(C): 36.7 (04 Apr 2025 20:00), Max: 37.2 (04 Apr 2025 16:00)  T(F): 98 (04 Apr 2025 20:00), Max: 99 (04 Apr 2025 16:00)  HR: 115 (05 Apr 2025 06:32) (114 - 120)  BP: 145/87 (04 Apr 2025 20:00) (145/87 - 152/93)  BP(mean): --  ABP: --  ABP(mean): --  RR: 22 (04 Apr 2025 20:00) (20 - 22)  SpO2: 95% (05 Apr 2025 06:32) (94% - 97%)    O2 Parameters below as of 04 Apr 2025 20:00  Patient On (Oxygen Delivery Method): nasal cannula  O2 Flow (L/min): 3            04-04 @ 07:01  -  04-05 @ 07:00  --------------------------------------------------------  IN: 2250 mL / OUT: 2450 mL / NET: -200 mL      CAPILLARY BLOOD GLUCOSE      POCT Blood Glucose.: 115 mg/dL (04 Apr 2025 22:33)    PHYSICAL EXAM:  General: NAD   Neck: Dressing overlying preexisting trach tube stoma  Cards: S1S2 RR, no murmurs   Pulm: Clear . No wheezes.   Abdomen: Morbidly obese abdomen. Soft, NTND. (+) PEG noted, site c/d/i.   Extremities: Mild b/l LE pedal edema. Extremities warm to touch. DSD on feet   Neurology: Awake/alert. 5/5 motor strength b/l UE. Weakness b/l LE. Following commands.   Skin: warm to touch, color appropriate for ethnicity. Refer to RN assessment for further details.        HOSPITAL MEDICATIONS:  MEDICATIONS  (STANDING):  amLODIPine   Tablet 10 milliGRAM(s) Oral daily  apixaban 5 milliGRAM(s) Oral every 12 hours  caspofungin IVPB 70 milliGRAM(s) IV Intermittent every 24 hours  chlorhexidine 2% Cloths 1 Application(s) Topical <User Schedule>  clotrimazole 1% Cream 1 Application(s) Topical two times a day  Dakins Solution - 1/4 Strength 1 Application(s) Topical two times a day  dextrose 50% Injectable 25 Gram(s) IV Push once  dextrose 50% Injectable 12.5 Gram(s) IV Push once  dextrose 50% Injectable 25 Gram(s) IV Push once  furosemide    Tablet 40 milliGRAM(s) Oral two times a day  gabapentin 800 milliGRAM(s) Oral every 8 hours  glucagon  Injectable 1 milliGRAM(s) IntraMuscular once  insulin lispro (ADMELOG) corrective regimen sliding scale   SubCutaneous three times a day before meals  insulin lispro (ADMELOG) corrective regimen sliding scale   SubCutaneous at bedtime  lidocaine   4% Patch 1 Patch Transdermal daily  lidocaine   4% Patch 1 Patch Transdermal daily  melatonin 3 milliGRAM(s) Oral at bedtime  multivitamin 1 Tablet(s) Oral daily  mupirocin 2% Ointment 1 Application(s) Topical two times a day  pantoprazole    Tablet 40 milliGRAM(s) Oral before breakfast  piperacillin/tazobactam IVPB.. 3.375 Gram(s) IV Intermittent every 8 hours  polyethylene glycol 3350 17 Gram(s) Oral daily  QUEtiapine 25 milliGRAM(s) Oral at bedtime  senna 2 Tablet(s) Oral at bedtime  vancomycin  IVPB 1250 milliGRAM(s) IV Intermittent every 8 hours    MEDICATIONS  (PRN):  acetaminophen     Tablet .. 650 milliGRAM(s) Oral every 6 hours PRN Temp greater or equal to 38C (100.4F), Mild Pain (1 - 3), Moderate Pain (4 - 6)  albuterol/ipratropium for Nebulization 3 milliLiter(s) Nebulizer every 6 hours PRN Shortness of Breath and/or Wheezing  dextrose Oral Gel 15 Gram(s) Oral once PRN Blood Glucose LESS THAN 70 milliGRAM(s)/deciliter  HYDROmorphone   Tablet 4 milliGRAM(s) Oral every 4 hours PRN Moderate-severe pain  HYDROmorphone  Injectable 0.5 milliGRAM(s) IV Push every 6 hours PRN dressing changes and severe breakthrough      LABS:                        7.9    13.99 )-----------( 410      ( 05 Apr 2025 06:10 )             25.2     04-05    137  |  93[L]  |  4[L]  ----------------------------<  91  3.2[L]   |  30  |  0.76    Ca    8.9      05 Apr 2025 06:10  Phos  5.0     04-05  Mg     1.90     04-05        Urinalysis Basic - ( 05 Apr 2025 06:10 )    Color: x / Appearance: x / SG: x / pH: x  Gluc: 91 mg/dL / Ketone: x  / Bili: x / Urobili: x   Blood: x / Protein: x / Nitrite: x   Leuk Esterase: x / RBC: x / WBC x   Sq Epi: x / Non Sq Epi: x / Bacteria: x

## 2025-04-05 NOTE — PROGRESS NOTE ADULT - NS ATTEND AMEND GEN_ALL_CORE FT
39M PMH obesity (Class III, BMI 69), pAfib (no AC), HTN, BEA (dz’ed 2019, AHI 34 non-compliant on CPAP set at 10), and history of b/l papilledema attributed to pseudotumor cerebri initially presenting as a transfer [facility] on 2/26 for acute hypoxemic respiratory failure s/p ETT intubation/MV with progression to refractory ARDS s/p initiation of vv-ECMO support (2/26-3/7) with failure to wean from ventilator s/p tracheostomy placement, further found to have RV failure s/p aggressive diuresis and PNA followed by severe sepsis 2/2 panniculitis c/b candida albicans fungemia now transferred to [facility] for further medical management.    - CT showing improving consolidations though with subq fat stranding and foci of air in sacrum - appreciate wound care recs  - c/w pain management for bilateral LE neuropathy  - c/w apixaban  - appreciate ID recs, currently on caspo, vanco, and zosyn

## 2025-04-06 LAB
ANION GAP SERPL CALC-SCNC: 15 MMOL/L — HIGH (ref 7–14)
BUN SERPL-MCNC: 5 MG/DL — LOW (ref 7–23)
CALCIUM SERPL-MCNC: 9.1 MG/DL — SIGNIFICANT CHANGE UP (ref 8.4–10.5)
CHLORIDE SERPL-SCNC: 93 MMOL/L — LOW (ref 98–107)
CO2 SERPL-SCNC: 29 MMOL/L — SIGNIFICANT CHANGE UP (ref 22–31)
CREAT SERPL-MCNC: 1 MG/DL — SIGNIFICANT CHANGE UP (ref 0.5–1.3)
EGFR: 99 ML/MIN/1.73M2 — SIGNIFICANT CHANGE UP
EGFR: 99 ML/MIN/1.73M2 — SIGNIFICANT CHANGE UP
GLUCOSE BLDC GLUCOMTR-MCNC: 104 MG/DL — HIGH (ref 70–99)
GLUCOSE BLDC GLUCOMTR-MCNC: 108 MG/DL — HIGH (ref 70–99)
GLUCOSE BLDC GLUCOMTR-MCNC: 115 MG/DL — HIGH (ref 70–99)
GLUCOSE BLDC GLUCOMTR-MCNC: 98 MG/DL — SIGNIFICANT CHANGE UP (ref 70–99)
GLUCOSE SERPL-MCNC: 90 MG/DL — SIGNIFICANT CHANGE UP (ref 70–99)
HCT VFR BLD CALC: 27.7 % — LOW (ref 39–50)
HGB BLD-MCNC: 8.6 G/DL — LOW (ref 13–17)
MAGNESIUM SERPL-MCNC: 1.8 MG/DL — SIGNIFICANT CHANGE UP (ref 1.6–2.6)
MCHC RBC-ENTMCNC: 27.1 PG — SIGNIFICANT CHANGE UP (ref 27–34)
MCHC RBC-ENTMCNC: 31 G/DL — LOW (ref 32–36)
MCV RBC AUTO: 87.4 FL — SIGNIFICANT CHANGE UP (ref 80–100)
NRBC # BLD AUTO: 0 K/UL — SIGNIFICANT CHANGE UP (ref 0–0)
NRBC # FLD: 0 K/UL — SIGNIFICANT CHANGE UP (ref 0–0)
NRBC BLD AUTO-RTO: 0 /100 WBCS — SIGNIFICANT CHANGE UP (ref 0–0)
PHOSPHATE SERPL-MCNC: 5.4 MG/DL — HIGH (ref 2.5–4.5)
PLATELET # BLD AUTO: 445 K/UL — HIGH (ref 150–400)
POTASSIUM SERPL-MCNC: 4 MMOL/L — SIGNIFICANT CHANGE UP (ref 3.5–5.3)
POTASSIUM SERPL-SCNC: 4 MMOL/L — SIGNIFICANT CHANGE UP (ref 3.5–5.3)
RBC # BLD: 3.17 M/UL — LOW (ref 4.2–5.8)
RBC # FLD: 23.5 % — HIGH (ref 10.3–14.5)
SODIUM SERPL-SCNC: 137 MMOL/L — SIGNIFICANT CHANGE UP (ref 135–145)
VANCOMYCIN FLD-MCNC: 17 UG/ML — SIGNIFICANT CHANGE UP
VANCOMYCIN TROUGH SERPL-MCNC: 21.2 UG/ML — HIGH (ref 10–20)
WBC # BLD: 13.42 K/UL — HIGH (ref 3.8–10.5)
WBC # FLD AUTO: 13.42 K/UL — HIGH (ref 3.8–10.5)

## 2025-04-06 PROCEDURE — 99233 SBSQ HOSP IP/OBS HIGH 50: CPT

## 2025-04-06 RX ORDER — VANCOMYCIN HCL IN 5 % DEXTROSE 1.5G/250ML
1 PLASTIC BAG, INJECTION (ML) INTRAVENOUS EVERY 8 HOURS
Refills: 0 | Status: DISCONTINUED | OUTPATIENT
Start: 2025-04-06 | End: 2025-04-06

## 2025-04-06 RX ORDER — HYDROMORPHONE/SOD CHLOR,ISO/PF 2 MG/10 ML
0.5 SYRINGE (ML) INJECTION ONCE
Refills: 0 | Status: DISCONTINUED | OUTPATIENT
Start: 2025-04-06 | End: 2025-04-06

## 2025-04-06 RX ORDER — VANCOMYCIN HCL IN 5 % DEXTROSE 1.5G/250ML
1000 PLASTIC BAG, INJECTION (ML) INTRAVENOUS EVERY 8 HOURS
Refills: 0 | Status: DISCONTINUED | OUTPATIENT
Start: 2025-04-06 | End: 2025-04-07

## 2025-04-06 RX ADMIN — Medication 25 GRAM(S): at 05:55

## 2025-04-06 RX ADMIN — Medication 4 MILLIGRAM(S): at 11:25

## 2025-04-06 RX ADMIN — Medication 0.5 MILLIGRAM(S): at 11:30

## 2025-04-06 RX ADMIN — Medication 4 MILLIGRAM(S): at 13:12

## 2025-04-06 RX ADMIN — Medication 25 GRAM(S): at 13:12

## 2025-04-06 RX ADMIN — LIDOCAINE HYDROCHLORIDE 1 PATCH: 20 JELLY TOPICAL at 23:18

## 2025-04-06 RX ADMIN — LIDOCAINE HYDROCHLORIDE 1 PATCH: 20 JELLY TOPICAL at 19:13

## 2025-04-06 RX ADMIN — Medication 4 MILLIGRAM(S): at 10:31

## 2025-04-06 RX ADMIN — Medication 3 MILLIGRAM(S): at 21:05

## 2025-04-06 RX ADMIN — GABAPENTIN 800 MILLIGRAM(S): 400 CAPSULE ORAL at 05:53

## 2025-04-06 RX ADMIN — Medication 0.5 MILLIGRAM(S): at 12:30

## 2025-04-06 RX ADMIN — Medication 4 MILLIGRAM(S): at 17:02

## 2025-04-06 RX ADMIN — GABAPENTIN 800 MILLIGRAM(S): 400 CAPSULE ORAL at 21:05

## 2025-04-06 RX ADMIN — AMLODIPINE BESYLATE 10 MILLIGRAM(S): 10 TABLET ORAL at 05:53

## 2025-04-06 RX ADMIN — APIXABAN 5 MILLIGRAM(S): 2.5 TABLET, FILM COATED ORAL at 05:53

## 2025-04-06 RX ADMIN — SODIUM HYPOCHLORITE 1 APPLICATION(S): 0.12 SOLUTION TOPICAL at 17:03

## 2025-04-06 RX ADMIN — Medication 4 MILLIGRAM(S): at 05:54

## 2025-04-06 RX ADMIN — Medication 4 MILLIGRAM(S): at 03:09

## 2025-04-06 RX ADMIN — CASPOFUNGIN ACETATE 260 MILLIGRAM(S): 5 INJECTION, POWDER, LYOPHILIZED, FOR SOLUTION INTRAVENOUS at 04:26

## 2025-04-06 RX ADMIN — APIXABAN 5 MILLIGRAM(S): 2.5 TABLET, FILM COATED ORAL at 17:02

## 2025-04-06 RX ADMIN — Medication 1 TABLET(S): at 11:00

## 2025-04-06 RX ADMIN — GABAPENTIN 800 MILLIGRAM(S): 400 CAPSULE ORAL at 13:11

## 2025-04-06 RX ADMIN — Medication 250 MILLIGRAM(S): at 21:15

## 2025-04-06 RX ADMIN — Medication 0.5 MILLIGRAM(S): at 08:28

## 2025-04-06 RX ADMIN — CLOTRIMAZOLE 1 APPLICATION(S): 1 CREAM TOPICAL at 17:03

## 2025-04-06 RX ADMIN — Medication 40 MILLIGRAM(S): at 05:54

## 2025-04-06 RX ADMIN — CLOTRIMAZOLE 1 APPLICATION(S): 1 CREAM TOPICAL at 05:51

## 2025-04-06 RX ADMIN — Medication 1 APPLICATION(S): at 05:50

## 2025-04-06 RX ADMIN — Medication 650 MILLIGRAM(S): at 00:28

## 2025-04-06 RX ADMIN — LIDOCAINE HYDROCHLORIDE 1 PATCH: 20 JELLY TOPICAL at 11:00

## 2025-04-06 RX ADMIN — Medication 25 GRAM(S): at 21:04

## 2025-04-06 RX ADMIN — Medication 4 MILLIGRAM(S): at 21:05

## 2025-04-06 RX ADMIN — Medication 4 MILLIGRAM(S): at 14:06

## 2025-04-06 RX ADMIN — Medication 4 MILLIGRAM(S): at 18:00

## 2025-04-06 RX ADMIN — MUPIROCIN CALCIUM 1 APPLICATION(S): 20 CREAM TOPICAL at 17:02

## 2025-04-06 RX ADMIN — Medication 166.67 MILLIGRAM(S): at 00:29

## 2025-04-06 RX ADMIN — FUROSEMIDE 40 MILLIGRAM(S): 10 INJECTION INTRAMUSCULAR; INTRAVENOUS at 13:11

## 2025-04-06 RX ADMIN — SODIUM HYPOCHLORITE 1 APPLICATION(S): 0.12 SOLUTION TOPICAL at 05:50

## 2025-04-06 RX ADMIN — QUETIAPINE FUMARATE 25 MILLIGRAM(S): 25 TABLET ORAL at 21:05

## 2025-04-06 RX ADMIN — FUROSEMIDE 40 MILLIGRAM(S): 10 INJECTION INTRAMUSCULAR; INTRAVENOUS at 05:53

## 2025-04-06 RX ADMIN — Medication 0.5 MILLIGRAM(S): at 09:28

## 2025-04-06 RX ADMIN — Medication 0.5 MILLIGRAM(S): at 00:28

## 2025-04-06 RX ADMIN — MUPIROCIN CALCIUM 1 APPLICATION(S): 20 CREAM TOPICAL at 05:54

## 2025-04-06 RX ADMIN — Medication 4 MILLIGRAM(S): at 22:05

## 2025-04-06 NOTE — PROVIDER CONTACT NOTE (OTHER) - ASSESSMENT
pt temp is 100.1
Patient complaining of led pain
pt has a fever 103.2
8/10 pain, grimacing
Axillary temp of 101.6 at 21:30, patient is warm to touch and sweating on forehead
Pt is resting in chair. No sign of distress noted.
Axillary temperature of 101.9 at 20:00, patient warm to touch
Pt's HR is 120 to 123 non sustaining, with no complaint of chest pain, dizziness, or SOB and no acute distress noted.
pt continues to have a fever 101.8
Axillary temp of 102.5 at 20:00, patient warm to touch
Axillary temp of 107.5 at 22:00, patient warm to touch, reports no subjective symptoms
pt temp was 100.1
PT having severe pain in legs, despite receiving hydromorphone at 20:00.

## 2025-04-06 NOTE — PROVIDER CONTACT NOTE (OTHER) - ACTION/TREATMENT ORDERED:
Verbal order to administer pain med
provider notified, IV tylenol ordered
provider notified, IV tylenol ordered, continue to monitor
PA/MD strickland ordered TV acetaminophen and it was given
provider notified, place ice packs on patient and continue to monitor
Provider notified and aware. Ice pack given, labs ordered and drawn. Zosyn infusing.
Provider ordered IV tylenol, sputum culture, blood culture, Xray, Flu Panel, Urinalysis, and cooling blanket.
administer dilaudid as ordered
MD/SONIA strickland ordered acetaminophen IV and it was given to pt. -
PA/MS mell ordered IV tyneol and morphine for pt.
Provider notified and aware. IV tylenol ordered
provider notified, IV tylenol ordered, will continue to monitor
Administer IV tylenol as ordered for pain and fever. Administer scheduled seroquel.

## 2025-04-06 NOTE — PROGRESS NOTE ADULT - NS ATTEND AMEND GEN_ALL_CORE FT
39M PMH obesity (Class III, BMI 69), pAfib (no AC), HTN, BEA (dz’ed 2019, AHI 34 non-compliant on CPAP set at 10), and history of b/l papilledema attributed to pseudotumor cerebri initially presenting as a transfer [facility] on 2/26 for acute hypoxemic respiratory failure s/p ETT intubation/MV with progression to refractory ARDS s/p initiation of vv-ECMO support (2/26-3/7) with failure to wean from ventilator s/p tracheostomy placement, further found to have RV failure s/p aggressive diuresis and PNA followed by severe sepsis 2/2 panniculitis c/b candida albicans fungemia now transferred to [facility] for further medical management.    - CT showing improving consolidations though with subq fat stranding and foci of air in sacrum - appreciate wound care recs  - c/w pain management for bilateral LE neuropathy  - c/w apixaban  - appreciate ID recs, currently on caspo, vanco, and zosyn 39M PMH obesity (Class III, BMI 69), pAfib (no AC), HTN, BEA (dz’ed 2019, AHI 34 non-compliant on CPAP set at 10), and history of b/l papilledema attributed to pseudotumor cerebri initially presenting as a transfer [facility] on 2/26 for acute hypoxemic respiratory failure s/p ETT intubation/MV with progression to refractory ARDS s/p initiation of vv-ECMO support (2/26-3/7) with failure to wean from ventilator s/p tracheostomy placement, further found to have RV failure s/p aggressive diuresis and PNA followed by severe sepsis 2/2 panniculitis c/b candida albicans fungemia now transferred to [facility] for further medical management.    - CT showing improving consolidations though with subq fat stranding and foci of air in sacrum - appreciate wound care recs  - c/w pain management for bilateral LE neuropathy; currently on high dose gabapentin, dilaudid, and lidocaine patch  - c/w apixaban  - appreciate ID recs, currently on caspo, vanco, and zosyn; leukocytosis downtrending, remains afebrile

## 2025-04-06 NOTE — PROVIDER CONTACT NOTE (OTHER) - NAME OF MD/NP/PA/DO NOTIFIED:
Elevated Temperature
Melissa Lopez
Michele Neumann
PA/MD monte
robbi prather
pa/MD strickland
Kevin Garland
Michele salazar
SONIA Carr
Luis Salazar
Melissa Lopez
SONIA Carr
PA/MD marco a [phone].

## 2025-04-06 NOTE — PROVIDER CONTACT NOTE (OTHER) - DATE AND TIME:
17-Mar-2025 20:15
18-Mar-2025 20:55
30-Mar-2025 22:49
31-Mar-2025 00:25
13-Mar-2025 02:23
13-Mar-2025 21:00
16-Mar-2025 15:15
19-Mar-2025 21:15
16-Mar-2025 22:30
16-Mar-2025 20:15
06-Apr-2025 00:00
17-Mar-2025 22:30
19-Mar-2025 00:00

## 2025-04-06 NOTE — PROGRESS NOTE ADULT - ASSESSMENT
40 YO M with PMHx of morbid obesity, BEA on CPAP, pAFIB (not on AC), HTN, and BL papilledema attributed to pseudotumor cerebri who presented initially to [facility] on 2/24 with SOB and BL LE edema. Found with URI outpatient with progressive SOB, and concern for noted for MFPNA on imaging. Course progressed with AHRF with worsening O2 demand, respiratory distress, secretions, and somnolence requiring intubation (difficult with success after 6 attempts) in ED and admission to [facility] MICU. While in MICU, patient noted with increasing O2 demand with no improvement despite optimal vent management. Concern noted for progressive ARDS, unable to prone given morbid obesity, and ultimately cannulated for vvECMO on 2/25 and transferred to [facility] for further management on 2/26. While at [facility], tx with diuresis and ABX, and ultimately decannulated and s/p 60XLTCP trach and PEG on 3/7. Patient ultimately transferred to [facility] on 3/11 and course complicated by recurrent high grade fevers and found with fungemia.    NEUROLOGY  # Hx of Pseudotumor Cerebri   - s/p LP in 2024 with high opening pressure  - s/p MRI 2024 with possible pituitary mass but unclear because of pressure effect from pseudotumor cerebri causing partially empty sella.  - Prolactin elevated   - ACTH low but recieved steroids during admission   - FT4 low normal and TSH normal, but given FT4 low normal TSH should be higher   - Discuss endocrine consult for inhouse work up vs outpatient.     # Sedation   - Weaned off sedation in ICU   - Nimbex turned off 2/27   - Prop turned off 3/9   - Precedex turned off and catapres started 3/11  - Continue on catapres 0.2 TID (decreased 3/25) - taper done 3/31 to 0.1 tid - discontinued 4/3     # Insomnia   - Patient worked night shift x 15 years   - Trouble sleeping at night leading to day time sleepiness and poor participation with PT  - Continue on Seroquel 25 QHS (increased 3/18)   - Continue on melatonin   - Continue on neurontin as below    # BL LE neuropathy   - CIPN concern   - Neurontin increased to 300mg Q8H with relief   - added Percocet Q4H for moderate pain (3/25) and continue on Dilaudid Q6H for severe pain   - Pain mgt called to consult - with recs for tylenol ATC, NEurontin increased to 400mg q6, Oxycodone 5 q4h prn , Dilaudid for BREAKTHROUGH pain only, Lidocaine patches on each leg, ice prn to feet  - Some improvement in pain - PT consult for TENS,   - oxycodone inc to 10mg as pt with no relief /buttocks area indurated /tender CT ordered -no abcess  - C/w Gabapentin : 600/600/800 and uptitrate as required  - Switched to PO Dilaudid 2mg Q6H PRN moderate-severe and IV Dilaudid PRN severe breakthrough and with dressing changes   - Still with severe pain 10/10 despite change to dilaudid will increase to 4mg q4h prn with iv dilaudid breakthrough  - Neurontin increased to 800mg q8h Monitor neuro status/lethergy   - I have reviewed with pt to not wait to long for dose of pain medication to  prevent pain from escalating   -Meds reviewed despite being in constant pain pt not taking dilaudid as often as he can often with 10/10 pain DIlaudid will be every 4 hours with parameters and pt can refuse Will reconsult pain mgt again for any recommendations on monday     CARDIOVASCULAR  # HTN   - HTN noted in ICU requiring cardene and esmolol GTTs   - Lisinopril dc'ed to increase catapres   - Continue on norvasc 10 and catapres 0.3 TID   - Monitor BP     # AFIB   - Hx of pAFIB   - No RVR episodes or pAFIB episodes in ICU   - No rate control medications needed  - Monitor on telemetry     # RV dilation second to PHTN   - POCUS on arrival to [facility] with RVE concern   - TTE 10/2024 with EF 55-60 with normal LVSF, moderate LVH and normal RVSF and size with no concern for pHTN   - TTE on 2/25 at  with EF 61 and normal LVSF, but enlarged RV size with reduced RVSF, mild TR, mild PHTN with PASP 49, and dilated IVC to 3.4cn, but normal TAPSE 2.1.  - Continue on aggressive diuresis in ICU    - TTE 3/11 with RVSF reduced with TAPSE 1.6. IVC improved to 2.12cm.   - Continue on lasix 40 PO BID   - Monitor IO/BMP    RESPIRATORY  # ARDS second to MFPNA   - Hx of BEA on CPAP presented to  post URI with SOB and found with AHRF with progression to ARDS second to post viral MFPNA   - Intubated 2/25   - Cannulated for vvECMO 2/26   - s/p 60XLTCP tracheostomy 3/7   - Decannulated 3/7   - CTSx removed tracheostomy and ECMO sutures on 3/17   - Continue on TC QD with PMV as able with strong phonation  - Continue on PS 18/10/40 QHS given OHS   - Continue on nebs and HTS   -  trials continue and now trialing overnight with BIPAP for OHS (10/8/12)  - Decannulated 3/28   - Using nasal cannula 4L     GI  # Dysphagia   - s/p PEG 3/7   - Attempted green dye on 3/13 and failed  - Continue on TF  - Continue on miralax and senna  - RPT green dye passed 3/19  - 3/20 Passed FEEST - on soft bite sized with thin liquids Added consistent carb 2/2 weight/Elevated A1c     # Mild transaminitis   - LFTs elevated in ICU with TBILI elevated likely from ECMO hemolysis   - US ABD with hepatic steatosis   - Trend LFTs      RENAL  # ELLA   - ELLA likely from cardiorenal with RVE   - Diuresed in ICU and improved   - Monitor renal function and UOP     # Hypernatremia   - Hypernatremia with fever spikes   - Fever curve improved and attempting to wean FWF   - Continue on  Q6H (decreased 3/19)    INFECTIOUS DISEASE  # Recurrent fevers with fungemia from unknown source   - Recurrent fevers post ECMO decannulation thought initially to be cytokine surge   - BCx, SCx, UCx, RVP and MRSA RPT on 3/12 negative   - Completed zosyn (3/12-3/18) empirically with improved WBC , however recurrent fevers noted.   - RPT SCx, UA, CXR and RVP negative  - TTE 3/17 with no IE  - PanCT 3/17 with PNA and persistent panniculitis   - BCx 3/15 and 3/16 with candida albicans.   - Concern for possible source from panniculitis   - Continue on caspo (3/17 - ) and RPT BCx 3/18 negative  - ID following  - plan for 2 week course after negative BCx to 4/2  - 3/20 Bcx- NGTD  - 3/30 spiked temp cx's sent + ua and c/s pending Bcx neg to date - started on vanco /zosyn ID following   - 4/3 - persistent fevers, leukocytosis. Will repeat CT C/A/P to evaluate for source - no source found- Bilat lung opacities mildly decreased         # MFPNA and abdominal pannus cellulitis   - Presented to  post URI with SOB and found with AHRF with progression to ARDS second to post viral MFPNA   - RVP, legionella, strept, BAL, BCx, UCx, HIV, PCP, and adenovirus PCR negative   - Initially started on multiple agents in ICU and ultimatelty completed zosyn (2/26-3/9) ZMAX (2/25-2/26) and vanco (2/26-3/1)     # R/o Sacral OM  - Noted with hard indurated sacral wound by Wound Care Team  - CT Pelvis (3/29) revaeling superficial skin thickening overlying the sacrum with underlying edema of the subcutaneous fat, in keeping with the clinical history of sacral wound. No evidence of underlying tract or drainable fluid collection.  - CT 4/3 - no fluid collection in sacral tissue     # Penile discharge   - GC/ chlamydia negative   - HIV negative   - Monitor for now    # PPX   - MRSA PCR positive and completed bactroban (2/26-3/3)     HEME   # Anemia likely second to ECMO circuit vs AOCD   - HH low in ICU second to ECMO circuit   - HH dropping again today however no bleeding noted   - Microcytic and hemochromic, sent anemia panel 3/19  - Trend      VASCULAR   # R IJ and BL LE DVTs   - Post ECMO dopplers on 3/9 negative for BL UE/ LE DVTs.   - Subsequent post ECMO dopplers performed on 3/14 and noted with acute, non-occlusive deep vein thrombosis noted within the right internal jugular vein. acute, occlusive deep vein thromboses noted within the right and left peroneal veins at both mid calves, and age-indeterminate, occlusive deep vein thrombosis visualized within the left gastrocnemius vein at the proximal calf.     - Continue on argatroban GTT and change to eliquis     PODIATRY   # BL plantar feet blisters likely pressors induced  - Seen by podiatry and blisters lanced on 3/4 and again on 3/18  - Continue on local wound care per podiatry   - Podiatry following  - OK for WBAT will fu with PT for offloading shoe if appropriate  - Pain mgt consult   - Podiatry FU 3/28 / 4/3 done /following     ENDOCRINE  # Pre-DM2   - A1C 6.7  - Continue on ISS   - Monitor FS   - IF no demand then stop ISS     LINES/ TUBES  - R IJ and R FEM ECMO (2/26-3/7)     SKIN  # Pannus canndial intertrigo   # Sacrum/ buttock MAD  - Continue on clotrimazole cream   - WOC appreciated   -Seen by WC pt noted to have oozing from Buttock wound surgicel placed by wound care On follow up no further bleeding  - Wound care placed orders   - No bleeding from wound     ETHICS/ GOC    - FULL CODE     DISPO - PT following  Seen by PMR for acute rehab per recs   40 YO M with PMHx of morbid obesity, BEA on CPAP, pAFIB (not on AC), HTN, and BL papilledema attributed to pseudotumor cerebri who presented initially to [facility] on 2/24 with SOB and BL LE edema. Found with URI outpatient with progressive SOB, and concern for noted for MFPNA on imaging. Course progressed with AHRF with worsening O2 demand, respiratory distress, secretions, and somnolence requiring intubation (difficult with success after 6 attempts) in ED and admission to [facility] MICU. While in MICU, patient noted with increasing O2 demand with no improvement despite optimal vent management. Concern noted for progressive ARDS, unable to prone given morbid obesity, and ultimately cannulated for vvECMO on 2/25 and transferred to [facility] for further management on 2/26. While at [facility], tx with diuresis and ABX, and ultimately decannulated and s/p 60XLTCP trach and PEG on 3/7. Patient ultimately transferred to [facility] on 3/11 and course complicated by recurrent high grade fevers and found with fungemia.    NEUROLOGY  # Hx of Pseudotumor Cerebri   - s/p LP in 2024 with high opening pressure  - s/p MRI 2024 with possible pituitary mass but unclear because of pressure effect from pseudotumor cerebri causing partially empty sella.  - Prolactin elevated   - ACTH low but recieved steroids during admission   - FT4 low normal and TSH normal, but given FT4 low normal TSH should be higher   - Discuss endocrine consult for inhouse work up vs outpatient.     # Sedation   - Weaned off sedation in ICU   - Nimbex turned off 2/27   - Prop turned off 3/9   - Precedex turned off and catapres started 3/11  - catapres 0.2 TID (decreased 3/25) - tapered down 3/31 to 0.1 tid - discontinued 4/3     # Insomnia   - Patient worked night shift x 15 years   - Trouble sleeping at night leading to day time sleepiness and poor participation with PT  - Continue on Seroquel 25 QHS (increased 3/18)   - Continue on melatonin   - Continue on neurontin as below    # BL LE neuropathy   - CIPN concern   - Neurontin increased to 300mg Q8H with relief   - added Percocet Q4H for moderate pain (3/25) and continue on Dilaudid Q6H for severe pain   - Pain mgt called to consult - with recs for tylenol ATC, NEurontin increased to 400mg q6, Oxycodone 5 q4h prn , Dilaudid for BREAKTHROUGH pain only, Lidocaine patches on each leg, ice prn to feet  - Some improvement in pain - PT consult for TENS,   - oxycodone inc to 10mg as pt with no relief /buttocks area indurated /tender CT ordered -no abcess  - C/w Gabapentin : 600/600/800 and uptitrate as required  - Switched to PO Dilaudid 2mg Q6H PRN moderate-severe and IV Dilaudid PRN severe breakthrough and with dressing changes   - Still with severe pain 10/10 despite change to dilaudid will increase to 4mg q4h prn with iv dilaudid breakthrough  - Neurontin increased to 800mg q8h Monitor neuro status/lethergy   - I have reviewed with pt to not wait to long for dose of pain medication to  prevent pain from escalating   -Meds reviewed despite being in constant pain pt not taking dilaudid as often as he can often with 10/10 pain DIlaudid will be every 4 hours with parameters and pt can refuse Will reconsult pain mgt again for any recommendations on monday     CARDIOVASCULAR  # HTN   - HTN noted in ICU requiring cardene and esmolol GTTs   - Lisinopril dc'ed to increase catapres   - Continue on norvasc 10 and catapres 0.3 TID   - Monitor BP     # AFIB   - Hx of pAFIB   - No RVR episodes or pAFIB episodes in ICU   - No rate control medications needed  - Monitor on telemetry     # RV dilation second to PHTN   - POCUS on arrival to [facility] with RVE concern   - TTE 10/2024 with EF 55-60 with normal LVSF, moderate LVH and normal RVSF and size with no concern for pHTN   - TTE on 2/25 at  with EF 61 and normal LVSF, but enlarged RV size with reduced RVSF, mild TR, mild PHTN with PASP 49, and dilated IVC to 3.4cn, but normal TAPSE 2.1.  - Continue on aggressive diuresis in ICU    - TTE 3/11 with RVSF reduced with TAPSE 1.6. IVC improved to 2.12cm.   - Continue on lasix 40 PO BID   - Monitor IO/BMP    RESPIRATORY  # ARDS second to MFPNA   - Hx of BEA on CPAP presented to  post URI with SOB and found with AHRF with progression to ARDS second to post viral MFPNA   - Intubated 2/25   - Cannulated for vvECMO 2/26   - s/p 60XLTCP tracheostomy 3/7   - Decannulated 3/7   - CTSx removed tracheostomy and ECMO sutures on 3/17   - Continue on TC QD with PMV as able with strong phonation  - Continue on PS 18/10/40 QHS given OHS   - Continue on nebs and HTS   -  trials continue and now trialing overnight with BIPAP for OHS (10/8/12)  - Decannulated 3/28   - Using nasal cannula 4L     GI  # Dysphagia   - s/p PEG 3/7   - Attempted green dye on 3/13 and failed  - Continue on TF  - Continue on miralax and senna  - RPT green dye passed 3/19  - 3/20 Passed FEEST - on soft bite sized with thin liquids Added consistent carb 2/2 weight/Elevated A1c     # Mild transaminitis   - LFTs elevated in ICU with TBILI elevated likely from ECMO hemolysis   - US ABD with hepatic steatosis   - Trend LFTs      RENAL  # ELLA   - ELLA likely from cardiorenal with RVE   - Diuresed in ICU and improved   - Monitor renal function and UOP     # Hypernatremia   - Hypernatremia with fever spikes   - Fever curve improved and attempting to wean FWF   - Continue on  Q6H (decreased 3/19)    INFECTIOUS DISEASE  # Recurrent fevers with fungemia from unknown source   - Recurrent fevers post ECMO decannulation thought initially to be cytokine surge   - BCx, SCx, UCx, RVP and MRSA RPT on 3/12 negative   - Completed zosyn (3/12-3/18) empirically with improved WBC , however recurrent fevers noted.   - RPT SCx, UA, CXR and RVP negative  - TTE 3/17 with no IE  - PanCT 3/17 with PNA and persistent panniculitis   - BCx 3/15 and 3/16 with candida albicans.   - Concern for possible source from panniculitis   - Continue on caspo (3/17 - ) and RPT BCx 3/18 negative  - ID following  - plan for 2 week course after negative BCx to 4/2  - 3/20 Bcx- NGTD  - 3/30 spiked temp cx's sent + ua and c/s pending Bcx neg to date - started on vanco /zosyn ID following   - 4/3 - persistent fevers, leukocytosis. Will repeat CT C/A/P to evaluate for source - no source found- Bilat lung opacities mildly decreased         # MFPNA and abdominal pannus cellulitis   - Presented to  post URI with SOB and found with AHRF with progression to ARDS second to post viral MFPNA   - RVP, legionella, strept, BAL, BCx, UCx, HIV, PCP, and adenovirus PCR negative   - Initially started on multiple agents in ICU and ultimatelty completed zosyn (2/26-3/9) ZMAX (2/25-2/26) and vanco (2/26-3/1)     # R/o Sacral OM  - Noted with hard indurated sacral wound by Wound Care Team  - CT Pelvis (3/29) revaeling superficial skin thickening overlying the sacrum with underlying edema of the subcutaneous fat, in keeping with the clinical history of sacral wound. No evidence of underlying tract or drainable fluid collection.  - CT 4/3 - no fluid collection in sacral tissue     # Penile discharge   - GC/ chlamydia negative   - HIV negative   - Monitor for now    # PPX   - MRSA PCR positive and completed bactroban (2/26-3/3)     HEME   # Anemia likely second to ECMO circuit vs AOCD   - HH low in ICU second to ECMO circuit   - HH dropping again today however no bleeding noted   - Microcytic and hemochromic, sent anemia panel 3/19  - Trend HH     VASCULAR   # R IJ and BL LE DVTs   - Post ECMO dopplers on 3/9 negative for BL UE/ LE DVTs.   - Subsequent post ECMO dopplers performed on 3/14 and noted with acute, non-occlusive deep vein thrombosis noted within the right internal jugular vein. acute, occlusive deep vein thromboses noted within the right and left peroneal veins at both mid calves, and age-indeterminate, occlusive deep vein thrombosis visualized within the left gastrocnemius vein at the proximal calf.     - Continue on argatroban GTT and change to eliquis     PODIATRY   # BL plantar feet blisters likely pressors induced  - Seen by podiatry and blisters lanced on 3/4 and again on 3/18  - Continue on local wound care per podiatry   - Podiatry following  - OK for WBAT will fu with PT for offloading shoe if appropriate  - Pain mgt consult   - Podiatry FU 3/28 / 4/3 done /following     ENDOCRINE  # Pre-DM2   - A1C 6.7  - Continue on ISS   - Monitor FS   - IF no demand then stop ISS     LINES/ TUBES  - R IJ and R FEM ECMO (2/26-3/7)     SKIN  # Pannus canndial intertrigo   # Sacrum/ buttock MAD  - Continue on clotrimazole cream   - WOC appreciated   -Seen by WC pt noted to have oozing from Buttock wound surgicel placed by wound care On follow up no further bleeding  - Wound care placed orders   - No bleeding from wound     ETHICS/ GOC    - FULL CODE     DISPO - PT following  Seen by PMR for acute rehab per recs

## 2025-04-06 NOTE — PROVIDER CONTACT NOTE (OTHER) - RECOMMENDATIONS
MD/SONIA strickland ordered acetaminophen IV and it was given to pt. -
Provider notified
contact provider
contact provider
PA/MS mell ordered IV tyneol and morphine for pt.
contact provider
Recommending IV tylenol
PA/MD marco a ordered TV acetaminophen
Pain medication
Provider notified
contact provider

## 2025-04-06 NOTE — PROGRESS NOTE ADULT - SUBJECTIVE AND OBJECTIVE BOX
CHIEF COMPLAINT: Patient is a 39y old  Male who presents with a chief complaint of sob (02 Mar 2025 06:19)      INTERVAL EVENTS: Continues with breakthrough pain     ROS: Seen by bedside during AM rounds     OBJECTIVE:  ICU Vital Signs Last 24 Hrs  T(C): 36.7 (04 Apr 2025 20:00), Max: 37.2 (04 Apr 2025 16:00)  T(F): 98 (04 Apr 2025 20:00), Max: 99 (04 Apr 2025 16:00)  HR: 115 (05 Apr 2025 06:32) (114 - 120)  BP: 145/87 (04 Apr 2025 20:00) (145/87 - 152/93)  BP(mean): --  ABP: --  ABP(mean): --  RR: 22 (04 Apr 2025 20:00) (20 - 22)  SpO2: 95% (05 Apr 2025 06:32) (94% - 97%)    O2 Parameters below as of 04 Apr 2025 20:00  Patient On (Oxygen Delivery Method): nasal cannula  O2 Flow (L/min): 3            04-04 @ 07:01  -  04-05 @ 07:00  --------------------------------------------------------  IN: 2250 mL / OUT: 2450 mL / NET: -200 mL      CAPILLARY BLOOD GLUCOSE      POCT Blood Glucose.: 115 mg/dL (04 Apr 2025 22:33)    PHYSICAL EXAM:  General: NAD   Neck: Dressing overlying preexisting trach tube stoma  Cards: S1S2 RR, no murmurs   Pulm: Clear . No wheezes.   Abdomen: Morbidly obese abdomen. Soft, NTND. (+) PEG noted, site c/d/i.   Extremities: Mild b/l LE pedal edema. Extremities warm to touch. DSD on feet   Neurology: Awake/alert. 5/5 motor strength b/l UE. Weakness b/l LE. Following commands.   Skin: warm to touch, color appropriate for ethnicity. Refer to RN assessment for further details.        HOSPITAL MEDICATIONS:  MEDICATIONS  (STANDING):  amLODIPine   Tablet 10 milliGRAM(s) Oral daily  apixaban 5 milliGRAM(s) Oral every 12 hours  caspofungin IVPB 70 milliGRAM(s) IV Intermittent every 24 hours  chlorhexidine 2% Cloths 1 Application(s) Topical <User Schedule>  clotrimazole 1% Cream 1 Application(s) Topical two times a day  Dakins Solution - 1/4 Strength 1 Application(s) Topical two times a day  dextrose 50% Injectable 25 Gram(s) IV Push once  dextrose 50% Injectable 12.5 Gram(s) IV Push once  dextrose 50% Injectable 25 Gram(s) IV Push once  furosemide    Tablet 40 milliGRAM(s) Oral two times a day  gabapentin 800 milliGRAM(s) Oral every 8 hours  glucagon  Injectable 1 milliGRAM(s) IntraMuscular once  insulin lispro (ADMELOG) corrective regimen sliding scale   SubCutaneous three times a day before meals  insulin lispro (ADMELOG) corrective regimen sliding scale   SubCutaneous at bedtime  lidocaine   4% Patch 1 Patch Transdermal daily  lidocaine   4% Patch 1 Patch Transdermal daily  melatonin 3 milliGRAM(s) Oral at bedtime  multivitamin 1 Tablet(s) Oral daily  mupirocin 2% Ointment 1 Application(s) Topical two times a day  pantoprazole    Tablet 40 milliGRAM(s) Oral before breakfast  piperacillin/tazobactam IVPB.. 3.375 Gram(s) IV Intermittent every 8 hours  polyethylene glycol 3350 17 Gram(s) Oral daily  QUEtiapine 25 milliGRAM(s) Oral at bedtime  senna 2 Tablet(s) Oral at bedtime  vancomycin  IVPB 1250 milliGRAM(s) IV Intermittent every 8 hours    MEDICATIONS  (PRN):  acetaminophen     Tablet .. 650 milliGRAM(s) Oral every 6 hours PRN Temp greater or equal to 38C (100.4F), Mild Pain (1 - 3), Moderate Pain (4 - 6)  albuterol/ipratropium for Nebulization 3 milliLiter(s) Nebulizer every 6 hours PRN Shortness of Breath and/or Wheezing  dextrose Oral Gel 15 Gram(s) Oral once PRN Blood Glucose LESS THAN 70 milliGRAM(s)/deciliter  HYDROmorphone   Tablet 4 milliGRAM(s) Oral every 4 hours PRN Moderate-severe pain  HYDROmorphone  Injectable 0.5 milliGRAM(s) IV Push every 6 hours PRN dressing changes and severe breakthrough      LABS:                        7.9    13.99 )-----------( 410      ( 05 Apr 2025 06:10 )             25.2     04-05    137  |  93[L]  |  4[L]  ----------------------------<  91  3.2[L]   |  30  |  0.76    Ca    8.9      05 Apr 2025 06:10  Phos  5.0     04-05  Mg     1.90     04-05        Urinalysis Basic - ( 05 Apr 2025 06:10 )    Color: x / Appearance: x / SG: x / pH: x  Gluc: 91 mg/dL / Ketone: x  / Bili: x / Urobili: x   Blood: x / Protein: x / Nitrite: x   Leuk Esterase: x / RBC: x / WBC x   Sq Epi: x / Non Sq Epi: x / Bacteria: x         CHIEF COMPLAINT: Patient is a 39y old  Male who presents with a chief complaint of sob (02 Mar 2025 06:19)    INTERVAL EVENTS: Continues with breakthrough pain     ROS: Seen by bedside during AM rounds     OBJECTIVE:  ICU Vital Signs Last 24 Hrs  T(C): 37.1 (06 Apr 2025 10:30), Max: 37.6 (05 Apr 2025 20:00)  T(F): 98.8 (06 Apr 2025 10:30), Max: 99.7 (05 Apr 2025 20:00)  HR: 119 (06 Apr 2025 14:41) (118 - 124)  BP: 120/68 (06 Apr 2025 13:09) (118/64 - 130/77)  RR: 18 (06 Apr 2025 10:30) (16 - 18)  SpO2: 97% (06 Apr 2025 14:41) (95% - 100%)    O2 Parameters below as of 06 Apr 2025 10:30  Patient On (Oxygen Delivery Method): nasal cannula  O2 Flow (L/min): 2    I&O's Summary    05 Apr 2025 07:01  -  06 Apr 2025 07:00  --------------------------------------------------------  IN: 2500 mL / OUT: 6050 mL / NET: -3550 mL    06 Apr 2025 07:01  -  06 Apr 2025 16:26  --------------------------------------------------------  IN: 1000 mL / OUT: 2150 mL / NET: -1150 mL    CAPILLARY BLOOD GLUCOSE  POCT Blood Glucose.: 115 mg/dL (06 Apr 2025 11:13)  POCT Blood Glucose.: 104 mg/dL (06 Apr 2025 07:50)  POCT Blood Glucose.: 109 mg/dL (05 Apr 2025 21:34)  POCT Blood Glucose.: 103 mg/dL (05 Apr 2025 17:19)    PHYSICAL EXAM:  General: NAD   Neck: Dressing overlying preexisting trach tube stoma  Cards: S1S2 RR, no murmurs   Pulm: Clear . No wheezes.   Abdomen: Morbidly obese abdomen. Soft, NTND. (+) PEG noted, site c/d/i.   Extremities: Mild b/l LE pedal edema. Extremities warm to touch. DSD on feet   Neurology: Awake/alert. 5/5 motor strength b/l UE. Weakness b/l LE. Following commands.   Skin: warm to touch, color appropriate for ethnicity. Refer to RN assessment for further details.    HOSPITAL MEDICATIONS:  MEDICATIONS  (STANDING):  amLODIPine   Tablet 10 milliGRAM(s) Oral daily  apixaban 5 milliGRAM(s) Oral every 12 hours  caspofungin IVPB 70 milliGRAM(s) IV Intermittent every 24 hours  chlorhexidine 2% Cloths 1 Application(s) Topical <User Schedule>  clotrimazole 1% Cream 1 Application(s) Topical two times a day  Dakins Solution - 1/4 Strength 1 Application(s) Topical two times a day  dextrose 50% Injectable 25 Gram(s) IV Push once  dextrose 50% Injectable 12.5 Gram(s) IV Push once  dextrose 50% Injectable 25 Gram(s) IV Push once  furosemide    Tablet 40 milliGRAM(s) Oral two times a day  gabapentin 800 milliGRAM(s) Oral every 8 hours  glucagon  Injectable 1 milliGRAM(s) IntraMuscular once  HYDROmorphone   Tablet 4 milliGRAM(s) Oral every 4 hours  insulin lispro (ADMELOG) corrective regimen sliding scale   SubCutaneous three times a day before meals  insulin lispro (ADMELOG) corrective regimen sliding scale   SubCutaneous at bedtime  lidocaine   4% Patch 1 Patch Transdermal daily  lidocaine   4% Patch 1 Patch Transdermal daily  melatonin 3 milliGRAM(s) Oral at bedtime  multivitamin 1 Tablet(s) Oral daily  mupirocin 2% Ointment 1 Application(s) Topical two times a day  pantoprazole    Tablet 40 milliGRAM(s) Oral before breakfast  piperacillin/tazobactam IVPB.. 3.375 Gram(s) IV Intermittent every 8 hours  polyethylene glycol 3350 17 Gram(s) Oral daily  QUEtiapine 25 milliGRAM(s) Oral at bedtime  senna 2 Tablet(s) Oral at bedtime    MEDICATIONS  (PRN):  acetaminophen     Tablet .. 650 milliGRAM(s) Oral every 6 hours PRN Temp greater or equal to 38C (100.4F), Mild Pain (1 - 3), Moderate Pain (4 - 6)  albuterol/ipratropium for Nebulization 3 milliLiter(s) Nebulizer every 6 hours PRN Shortness of Breath and/or Wheezing  dextrose Oral Gel 15 Gram(s) Oral once PRN Blood Glucose LESS THAN 70 milliGRAM(s)/deciliter  HYDROmorphone  Injectable 0.5 milliGRAM(s) IV Push every 6 hours PRN dressing changes and severe breakthrough    LABS:                        8.6    13.42 )-----------( 445      ( 06 Apr 2025 06:25 )             27.7   04-06    137  |  93[L]  |  5[L]  ----------------------------<  90  4.0   |  29  |  1.00    Ca    9.1      06 Apr 2025 06:25  Phos  5.4     04-06  Mg     1.80     04-06

## 2025-04-06 NOTE — PROVIDER CONTACT NOTE (OTHER) - REASON
Pain
pt has severe leg pain despite  receiving hydromorphone.
Axillary temp of 102.5 at 20:00
pt has a fever 103.2
Axillary temp of 107.5 at 22:00
Axillary temperature of 101.9 at 20:00
pt temp is 100.1
Axillary temp of 101.6 at 21:30
pt continues to have a fever 101.8
-123 at rest
Fever
Vince SCOTT
pt temp was 100.1

## 2025-04-06 NOTE — PROVIDER CONTACT NOTE (OTHER) - BACKGROUND
HTN, OBESITY, PNA, SOB
Admitted for pneumonia from unspecified organism
HTN, OBESITY, SOB, AHRF
HTN, SOB, OBESITY, AHRF
Pt admitted for pneumonia
pna
Patient admitted for pneumonia
PNA
pna
Patient admitted for pneumonia
HTN, PNA, CRF, OBESITY
PNA
PNA

## 2025-04-07 LAB
ANION GAP SERPL CALC-SCNC: 14 MMOL/L — SIGNIFICANT CHANGE UP (ref 7–14)
BUN SERPL-MCNC: 10 MG/DL — SIGNIFICANT CHANGE UP (ref 7–23)
CALCIUM SERPL-MCNC: 9.1 MG/DL — SIGNIFICANT CHANGE UP (ref 8.4–10.5)
CHLORIDE SERPL-SCNC: 92 MMOL/L — LOW (ref 98–107)
CO2 SERPL-SCNC: 29 MMOL/L — SIGNIFICANT CHANGE UP (ref 22–31)
CREAT SERPL-MCNC: 1.76 MG/DL — HIGH (ref 0.5–1.3)
EGFR: 50 ML/MIN/1.73M2 — LOW
EGFR: 50 ML/MIN/1.73M2 — LOW
GLUCOSE BLDC GLUCOMTR-MCNC: 106 MG/DL — HIGH (ref 70–99)
GLUCOSE BLDC GLUCOMTR-MCNC: 108 MG/DL — HIGH (ref 70–99)
GLUCOSE BLDC GLUCOMTR-MCNC: 111 MG/DL — HIGH (ref 70–99)
GLUCOSE BLDC GLUCOMTR-MCNC: 97 MG/DL — SIGNIFICANT CHANGE UP (ref 70–99)
GLUCOSE SERPL-MCNC: 109 MG/DL — HIGH (ref 70–99)
HCT VFR BLD CALC: 22.9 % — LOW (ref 39–50)
HGB BLD-MCNC: 8 G/DL — LOW (ref 13–17)
MAGNESIUM SERPL-MCNC: 1.8 MG/DL — SIGNIFICANT CHANGE UP (ref 1.6–2.6)
MCHC RBC-ENTMCNC: 32.5 PG — SIGNIFICANT CHANGE UP (ref 27–34)
MCHC RBC-ENTMCNC: 34.9 G/DL — SIGNIFICANT CHANGE UP (ref 32–36)
MCV RBC AUTO: 93.1 FL — SIGNIFICANT CHANGE UP (ref 80–100)
NRBC # BLD AUTO: 0 K/UL — SIGNIFICANT CHANGE UP (ref 0–0)
NRBC # FLD: 0 K/UL — SIGNIFICANT CHANGE UP (ref 0–0)
NRBC BLD AUTO-RTO: 0 /100 WBCS — SIGNIFICANT CHANGE UP (ref 0–0)
PHOSPHATE SERPL-MCNC: 6 MG/DL — HIGH (ref 2.5–4.5)
PLATELET # BLD AUTO: 413 K/UL — HIGH (ref 150–400)
POTASSIUM SERPL-MCNC: 3.7 MMOL/L — SIGNIFICANT CHANGE UP (ref 3.5–5.3)
POTASSIUM SERPL-SCNC: 3.7 MMOL/L — SIGNIFICANT CHANGE UP (ref 3.5–5.3)
RBC # BLD: 2.46 M/UL — LOW (ref 4.2–5.8)
RBC # FLD: 25.9 % — HIGH (ref 10.3–14.5)
SODIUM SERPL-SCNC: 135 MMOL/L — SIGNIFICANT CHANGE UP (ref 135–145)
WBC # BLD: 13.02 K/UL — HIGH (ref 3.8–10.5)
WBC # FLD AUTO: 13.02 K/UL — HIGH (ref 3.8–10.5)

## 2025-04-07 PROCEDURE — G0545: CPT

## 2025-04-07 PROCEDURE — 99233 SBSQ HOSP IP/OBS HIGH 50: CPT

## 2025-04-07 RX ORDER — SODIUM CHLORIDE 9 G/1000ML
1000 INJECTION, SOLUTION INTRAVENOUS
Refills: 0 | Status: DISCONTINUED | OUTPATIENT
Start: 2025-04-07 | End: 2025-04-09

## 2025-04-07 RX ADMIN — LIDOCAINE HYDROCHLORIDE 1 PATCH: 20 JELLY TOPICAL at 12:15

## 2025-04-07 RX ADMIN — LIDOCAINE HYDROCHLORIDE 1 PATCH: 20 JELLY TOPICAL at 19:35

## 2025-04-07 RX ADMIN — APIXABAN 5 MILLIGRAM(S): 2.5 TABLET, FILM COATED ORAL at 17:53

## 2025-04-07 RX ADMIN — MUPIROCIN CALCIUM 1 APPLICATION(S): 20 CREAM TOPICAL at 05:24

## 2025-04-07 RX ADMIN — Medication 1 TABLET(S): at 12:14

## 2025-04-07 RX ADMIN — QUETIAPINE FUMARATE 25 MILLIGRAM(S): 25 TABLET ORAL at 22:03

## 2025-04-07 RX ADMIN — GABAPENTIN 800 MILLIGRAM(S): 400 CAPSULE ORAL at 13:38

## 2025-04-07 RX ADMIN — Medication 0.5 MILLIGRAM(S): at 19:15

## 2025-04-07 RX ADMIN — Medication 0.5 MILLIGRAM(S): at 12:13

## 2025-04-07 RX ADMIN — Medication 40 MILLIGRAM(S): at 05:28

## 2025-04-07 RX ADMIN — FUROSEMIDE 40 MILLIGRAM(S): 10 INJECTION INTRAMUSCULAR; INTRAVENOUS at 05:21

## 2025-04-07 RX ADMIN — Medication 1 APPLICATION(S): at 05:27

## 2025-04-07 RX ADMIN — MUPIROCIN CALCIUM 1 APPLICATION(S): 20 CREAM TOPICAL at 17:54

## 2025-04-07 RX ADMIN — Medication 0.5 MILLIGRAM(S): at 06:23

## 2025-04-07 RX ADMIN — CASPOFUNGIN ACETATE 260 MILLIGRAM(S): 5 INJECTION, POWDER, LYOPHILIZED, FOR SOLUTION INTRAVENOUS at 04:05

## 2025-04-07 RX ADMIN — Medication 4 MILLIGRAM(S): at 01:33

## 2025-04-07 RX ADMIN — Medication 25 GRAM(S): at 13:39

## 2025-04-07 RX ADMIN — Medication 0.5 MILLIGRAM(S): at 19:45

## 2025-04-07 RX ADMIN — FUROSEMIDE 40 MILLIGRAM(S): 10 INJECTION INTRAMUSCULAR; INTRAVENOUS at 13:38

## 2025-04-07 RX ADMIN — CLOTRIMAZOLE 1 APPLICATION(S): 1 CREAM TOPICAL at 17:57

## 2025-04-07 RX ADMIN — GABAPENTIN 800 MILLIGRAM(S): 400 CAPSULE ORAL at 21:59

## 2025-04-07 RX ADMIN — Medication 0.5 MILLIGRAM(S): at 12:43

## 2025-04-07 RX ADMIN — CLOTRIMAZOLE 1 APPLICATION(S): 1 CREAM TOPICAL at 05:24

## 2025-04-07 RX ADMIN — Medication 4 MILLIGRAM(S): at 21:59

## 2025-04-07 RX ADMIN — SODIUM HYPOCHLORITE 1 APPLICATION(S): 0.12 SOLUTION TOPICAL at 05:28

## 2025-04-07 RX ADMIN — Medication 4 MILLIGRAM(S): at 17:53

## 2025-04-07 RX ADMIN — GABAPENTIN 800 MILLIGRAM(S): 400 CAPSULE ORAL at 05:21

## 2025-04-07 RX ADMIN — Medication 4 MILLIGRAM(S): at 01:20

## 2025-04-07 RX ADMIN — Medication 4 MILLIGRAM(S): at 13:38

## 2025-04-07 RX ADMIN — Medication 25 GRAM(S): at 05:23

## 2025-04-07 RX ADMIN — Medication 2 TABLET(S): at 22:02

## 2025-04-07 RX ADMIN — Medication 4 MILLIGRAM(S): at 05:22

## 2025-04-07 RX ADMIN — LIDOCAINE HYDROCHLORIDE 1 PATCH: 20 JELLY TOPICAL at 12:14

## 2025-04-07 RX ADMIN — SODIUM CHLORIDE 100 MILLILITER(S): 9 INJECTION, SOLUTION INTRAVENOUS at 18:16

## 2025-04-07 RX ADMIN — Medication 0.5 MILLIGRAM(S): at 06:38

## 2025-04-07 RX ADMIN — Medication 250 MILLIGRAM(S): at 05:22

## 2025-04-07 RX ADMIN — Medication 4 MILLIGRAM(S): at 05:38

## 2025-04-07 RX ADMIN — APIXABAN 5 MILLIGRAM(S): 2.5 TABLET, FILM COATED ORAL at 05:21

## 2025-04-07 RX ADMIN — AMLODIPINE BESYLATE 10 MILLIGRAM(S): 10 TABLET ORAL at 05:21

## 2025-04-07 RX ADMIN — LIDOCAINE HYDROCHLORIDE 1 PATCH: 20 JELLY TOPICAL at 19:42

## 2025-04-07 RX ADMIN — Medication 4 MILLIGRAM(S): at 09:25

## 2025-04-07 RX ADMIN — SODIUM HYPOCHLORITE 1 APPLICATION(S): 0.12 SOLUTION TOPICAL at 17:54

## 2025-04-07 NOTE — PROGRESS NOTE ADULT - SUBJECTIVE AND OBJECTIVE BOX
Patient is a 39y old  Male who presents with a chief complaint of sob (02 Mar 2025 06:19)       INTERVAL HPI/OVERNIGHT EVENTS:  Patient seen and evaluated at bedside.  Pt is resting comfortable in NAD. Denies N/V/F/C.  Pain rated at X/10    Allergies    No Known Allergies    Intolerances        Vital Signs Last 24 Hrs  T(C): 36.6 (07 Apr 2025 05:20), Max: 37.7 (06 Apr 2025 21:00)  T(F): 97.9 (07 Apr 2025 05:20), Max: 99.8 (06 Apr 2025 21:00)  HR: 112 (07 Apr 2025 07:33) (104 - 126)  BP: 138/90 (07 Apr 2025 05:20) (123/63 - 140/78)  BP(mean): --  RR: 18 (07 Apr 2025 05:20) (17 - 19)  SpO2: 97% (07 Apr 2025 10:54) (96% - 100%)    Parameters below as of 07 Apr 2025 05:20  Patient On (Oxygen Delivery Method): BiPAP/CPAP        LABS:                        8.0    13.02 )-----------( 413      ( 07 Apr 2025 06:50 )             22.9     04-07    135  |  92[L]  |  10  ----------------------------<  109[H]  3.7   |  29  |  1.76[H]    Ca    9.1      07 Apr 2025 06:50  Phos  6.0     04-07  Mg     1.80     04-07        Urinalysis Basic - ( 07 Apr 2025 06:50 )    Color: x / Appearance: x / SG: x / pH: x  Gluc: 109 mg/dL / Ketone: x  / Bili: x / Urobili: x   Blood: x / Protein: x / Nitrite: x   Leuk Esterase: x / RBC: x / WBC x   Sq Epi: x / Non Sq Epi: x / Bacteria: x      CAPILLARY BLOOD GLUCOSE      POCT Blood Glucose.: 106 mg/dL (07 Apr 2025 11:50)  POCT Blood Glucose.: 111 mg/dL (07 Apr 2025 08:32)  POCT Blood Glucose.: 108 mg/dL (06 Apr 2025 21:35)  POCT Blood Glucose.: 98 mg/dL (06 Apr 2025 17:00)      Lower Extremity Physical Exam:  Vascular: DP 1/4 B/L, PT 0/4, B/L secondary to +3 pitting edema, CFT <3 seconds B/L, Temperature gradient warm to cool, B/L.   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 Right foot plantar lateral forefoot wound to subQ, no malodor, no pus, no acute signs of infection. Left foot digit 1,2,4 partial thickness dry gangrene. Left foot lateral plantar forefoot wound to subQ, no malodor, no pus, no acute signs of infection.    RADIOLOGY & ADDITIONAL TESTS:

## 2025-04-07 NOTE — PROGRESS NOTE ADULT - SUBJECTIVE AND OBJECTIVE BOX
Infectious Diseases Follow Up:    Patient is a 39y old  Male who presents with a chief complaint of sob (02 Mar 2025 06:19)      Interval History/ROS:  No acute events noted, pt is afebrile, WBC slowly improving.   Pt w/ b/l foot pain, denies abdominal pain, SOB, dysuria, diarrhea     Allergies  No Known Allergies        ANTIMICROBIALS:  caspofungin IVPB 70 every 24 hours  piperacillin/tazobactam IVPB.. 3.375 every 8 hours  vancomycin  IVPB 1000 every 8 hours      Current Abx:     Previous Abx     OTHER MEDS:  MEDICATIONS  (STANDING):  acetaminophen     Tablet .. 650 every 6 hours PRN  albuterol/ipratropium for Nebulization 3 every 6 hours PRN  amLODIPine   Tablet 10 daily  apixaban 5 every 12 hours  dextrose 50% Injectable 25 once  dextrose 50% Injectable 12.5 once  dextrose 50% Injectable 25 once  dextrose Oral Gel 15 once PRN  furosemide    Tablet 40 two times a day  gabapentin 800 every 8 hours  glucagon  Injectable 1 once  HYDROmorphone   Tablet 4 every 4 hours  HYDROmorphone  Injectable 0.5 every 6 hours PRN  insulin lispro (ADMELOG) corrective regimen sliding scale  three times a day before meals  insulin lispro (ADMELOG) corrective regimen sliding scale  at bedtime  melatonin 3 at bedtime  pantoprazole    Tablet 40 before breakfast  polyethylene glycol 3350 17 daily  QUEtiapine 25 at bedtime  senna 2 at bedtime      Vital Signs Last 24 Hrs  T(C): 36.6 (07 Apr 2025 05:20), Max: 37.7 (06 Apr 2025 21:00)  T(F): 97.9 (07 Apr 2025 05:20), Max: 99.8 (06 Apr 2025 21:00)  HR: 112 (07 Apr 2025 07:33) (104 - 126)  BP: 138/90 (07 Apr 2025 05:20) (120/68 - 140/78)  BP(mean): --  RR: 18 (07 Apr 2025 05:20) (17 - 19)  SpO2: 97% (07 Apr 2025 10:54) (96% - 100%)    Parameters below as of 07 Apr 2025 05:20  Patient On (Oxygen Delivery Method): BiPAP/CPAP        PHYSICAL EXAM:  GENERAL: NAD  HEAD:  Atraumatic, Normocephalic  EYES: EOMI, conjunctiva and sclera clear  CHEST/LUNG: On NC, CTAB  HEART: RRR  ABDOMEN: Soft, Nontender, Nondistended;  Extremities: B/l feet wrapped   PSYCH: AAOx3                        8.0    13.02 )-----------( 413      ( 07 Apr 2025 06:50 )             22.9       04-07    135  |  92[L]  |  10  ----------------------------<  109[H]  3.7   |  29  |  1.76[H]    Ca    9.1      07 Apr 2025 06:50  Phos  6.0     04-07  Mg     1.80     04-07        Urinalysis Basic - ( 07 Apr 2025 06:50 )    Color: x / Appearance: x / SG: x / pH: x  Gluc: 109 mg/dL / Ketone: x  / Bili: x / Urobili: x   Blood: x / Protein: x / Nitrite: x   Leuk Esterase: x / RBC: x / WBC x   Sq Epi: x / Non Sq Epi: x / Bacteria: x        MICROBIOLOGY:  Vancomycin Level, Random: 17.0 ug/mL (04-06-25 @ 18:28)  v  Clean Catch Clean Catch (Midstream)  03-31-25   <10,000 CFU/mL Normal Urogenital Kyle  --  --      Blood Blood-Peripheral  03-30-25   No growth at 5 days  --  --      Trach Asp Tracheal Aspirate  03-21-25   Commensal kyle consistent with body site  --    Few polymorphonuclear leukocytes per low power field  No Squamous epithelial cells per low power field  No organisms seen per oil power field      Blood Blood-Peripheral  03-20-25   No growth at 5 days  --  --      Blood Blood-Venous  03-20-25   No growth at 5 days  --  --      Blood Blood-Peripheral  03-18-25   No growth at 5 days  --  Blood Culture PCR  Candida albicans      Nares/Axilla/Groin Nares/Axilla/Groin  03-17-25   Culture NEGATIVE for Candida auris.  This surveillance culture is intended for Infection Control purposes only.  A negative result does not preclude the carriage of other fungal  organisms.  --  --      Blood Blood-Venous  03-16-25   Growth in aerobic bottle: Candida albicans  See previous culture [id number]  --    Growth in aerobic bottle: Yeast like cells      Blood Blood-Peripheral  03-16-25   Growth in aerobic bottle: Candida albicans  See previous culture [id number]  --    Growth in aerobic bottle: Yeast like cells      Trach Asp Tracheal Aspirate  03-16-25   Commensal kyle consistent with body site  --    Few polymorphonuclear leukocytes per low power field  Rare Squamous epithelial cells per low power field  Rare Gram Positive Rods seen per oil power field  Rare Gram Negative Rods seen per oil power field      Blood Blood-Venous  03-15-25   Growth in aerobic bottle: Candida albicans  See previous culture [id number]  --    Growth in aerobic bottle: Yeast like cells      Blood Blood-Peripheral  03-15-25   Growth in aerobic bottle: Candida albicans  Direct identification is available within approximately 3-5  hours either by Blood Panel Multiplexed PCR or Direct  MALDI-TOF. Details: [email or web address]  --  Blood Culture PCR  Candida albicans      Catheterized Catheterized  03-12-25   <10,000 CFU/mL Normal Urogenital Kyle  --  --      Blood Blood-Peripheral  03-11-25   No growth at 5 days  --  --      Blood Blood-Peripheral  03-11-25   No growth at 5 days  --  --      Trach Asp Tracheal Aspirate  03-10-25   Commensal kyle consistent with body site  --    Few polymorphonuclear leukocytes per low power field  No Squamous epithelial cells per low power field  No organisms seen per oil power field                RADIOLOGY:

## 2025-04-07 NOTE — CHART NOTE - NSCHARTNOTEFT_GEN_A_CORE
Thoracic surgery asked by RCU to remove pt;s PEG.   PEG placed by DR. Llanes 31 days ago  Pt now tolerating Oral diet.   Per DR. Llanes, soonest availability to remove PEG would be  Wednesday or Friday of next week and Eliquis would need to be held for at least 48hrs prior  to removal.   If pt ready for discharge before then, pt can come back for elective PEG  removal w/ Dr. Llanes as an outpatient.   Please notify Thoracic surgery if you want to proceed with PEG removal while inpt  so we can book and plan accordingly.   Above d/w RCU ACP

## 2025-04-07 NOTE — PROGRESS NOTE ADULT - SUBJECTIVE AND OBJECTIVE BOX
CHIEF COMPLAINT:Patient is a 39y old  Male who presents with a chief complaint of sob (02 Mar 2025 06:19)      INTERVAL EVENTS:     ROS: Seen by bedside during AM rounds     OBJECTIVE:  ICU Vital Signs Last 24 Hrs  T(C): 36.6 (07 Apr 2025 05:20), Max: 37.7 (06 Apr 2025 21:00)  T(F): 97.9 (07 Apr 2025 05:20), Max: 99.8 (06 Apr 2025 21:00)  HR: 112 (07 Apr 2025 07:33) (104 - 126)  BP: 138/90 (07 Apr 2025 05:20) (120/68 - 140/78)  BP(mean): --  ABP: --  ABP(mean): --  RR: 18 (07 Apr 2025 05:20) (17 - 19)  SpO2: 97% (07 Apr 2025 07:33) (96% - 100%)    O2 Parameters below as of 07 Apr 2025 05:20  Patient On (Oxygen Delivery Method): BiPAP/CPAP              04-06 @ 07:01  -  04-07 @ 07:00  --------------------------------------------------------  IN: 1000 mL / OUT: 6300 mL / NET: -5300 mL      CAPILLARY BLOOD GLUCOSE      POCT Blood Glucose.: 111 mg/dL (07 Apr 2025 08:32)      PHYSICAL EXAM:  General:   HEENT:   Lymph Nodes:  Neck:   Respiratory:   Cardiovascular:   Abdomen:   Extremities:   Skin:   Neurological:  Psychiatry:        HOSPITAL MEDICATIONS:  MEDICATIONS  (STANDING):  amLODIPine   Tablet 10 milliGRAM(s) Oral daily  apixaban 5 milliGRAM(s) Oral every 12 hours  caspofungin IVPB 70 milliGRAM(s) IV Intermittent every 24 hours  chlorhexidine 2% Cloths 1 Application(s) Topical <User Schedule>  clotrimazole 1% Cream 1 Application(s) Topical two times a day  Dakins Solution - 1/4 Strength 1 Application(s) Topical two times a day  dextrose 50% Injectable 25 Gram(s) IV Push once  dextrose 50% Injectable 12.5 Gram(s) IV Push once  dextrose 50% Injectable 25 Gram(s) IV Push once  furosemide    Tablet 40 milliGRAM(s) Oral two times a day  gabapentin 800 milliGRAM(s) Oral every 8 hours  glucagon  Injectable 1 milliGRAM(s) IntraMuscular once  HYDROmorphone   Tablet 4 milliGRAM(s) Oral every 4 hours  insulin lispro (ADMELOG) corrective regimen sliding scale   SubCutaneous three times a day before meals  insulin lispro (ADMELOG) corrective regimen sliding scale   SubCutaneous at bedtime  lidocaine   4% Patch 1 Patch Transdermal daily  lidocaine   4% Patch 1 Patch Transdermal daily  melatonin 3 milliGRAM(s) Oral at bedtime  multivitamin 1 Tablet(s) Oral daily  mupirocin 2% Ointment 1 Application(s) Topical two times a day  pantoprazole    Tablet 40 milliGRAM(s) Oral before breakfast  piperacillin/tazobactam IVPB.. 3.375 Gram(s) IV Intermittent every 8 hours  polyethylene glycol 3350 17 Gram(s) Oral daily  QUEtiapine 25 milliGRAM(s) Oral at bedtime  senna 2 Tablet(s) Oral at bedtime  vancomycin  IVPB 1000 milliGRAM(s) IV Intermittent every 8 hours    MEDICATIONS  (PRN):  acetaminophen     Tablet .. 650 milliGRAM(s) Oral every 6 hours PRN Temp greater or equal to 38C (100.4F), Mild Pain (1 - 3), Moderate Pain (4 - 6)  albuterol/ipratropium for Nebulization 3 milliLiter(s) Nebulizer every 6 hours PRN Shortness of Breath and/or Wheezing  dextrose Oral Gel 15 Gram(s) Oral once PRN Blood Glucose LESS THAN 70 milliGRAM(s)/deciliter  HYDROmorphone  Injectable 0.5 milliGRAM(s) IV Push every 6 hours PRN dressing changes and severe breakthrough      LABS:                        8.0    13.02 )-----------( 413      ( 07 Apr 2025 06:50 )             22.9     04-07    135  |  92[L]  |  x   ----------------------------<  x   3.7   |  x   |  x     Ca    9.1      06 Apr 2025 06:25  Phos  6.0     04-07  Mg     1.80     04-07        Urinalysis Basic - ( 06 Apr 2025 06:25 )    Color: x / Appearance: x / SG: x / pH: x  Gluc: 90 mg/dL / Ketone: x  / Bili: x / Urobili: x   Blood: x / Protein: x / Nitrite: x   Leuk Esterase: x / RBC: x / WBC x   Sq Epi: x / Non Sq Epi: x / Bacteria: x         CHIEF COMPLAINT:Patient is a 39y old  Male who presents with a chief complaint of sob (02 Mar 2025 06:19)      INTERVAL EVENTS:     ROS: Seen by bedside during AM rounds     OBJECTIVE:  ICU Vital Signs Last 24 Hrs  T(C): 36.6 (07 Apr 2025 05:20), Max: 37.7 (06 Apr 2025 21:00)  T(F): 97.9 (07 Apr 2025 05:20), Max: 99.8 (06 Apr 2025 21:00)  HR: 112 (07 Apr 2025 07:33) (104 - 126)  BP: 138/90 (07 Apr 2025 05:20) (120/68 - 140/78)  BP(mean): --  ABP: --  ABP(mean): --  RR: 18 (07 Apr 2025 05:20) (17 - 19)  SpO2: 97% (07 Apr 2025 07:33) (96% - 100%)    O2 Parameters below as of 07 Apr 2025 05:20  Patient On (Oxygen Delivery Method): BiPAP/CPAP              04-06 @ 07:01  -  04-07 @ 07:00  --------------------------------------------------------  IN: 1000 mL / OUT: 6300 mL / NET: -5300 mL      CAPILLARY BLOOD GLUCOSE      POCT Blood Glucose.: 111 mg/dL (07 Apr 2025 08:32)      PHYSICAL EXAM:  General: NAD   Neck: trachea midline.  Cards: S1/S2, no murmurs   Pulm: CTA bilaterally. No wheezes.   Abdomen: Abdomen soft. Morbidly obese. Nontender. (+) PEG noted, site c/d/i.   Extremities: 1-2+ b/l LE pitting edema. Extremities warm to touch.  Neurology: AOx3. 5/5 motor strength b/l UE. Significant weakness with inability to range b/l LE.  Follows commands. Conversational.   Skin: warm to touch, color appropriate for ethnicity. Refer to RN assessment for further details.        HOSPITAL MEDICATIONS:  MEDICATIONS  (STANDING):  amLODIPine   Tablet 10 milliGRAM(s) Oral daily  apixaban 5 milliGRAM(s) Oral every 12 hours  caspofungin IVPB 70 milliGRAM(s) IV Intermittent every 24 hours  chlorhexidine 2% Cloths 1 Application(s) Topical <User Schedule>  clotrimazole 1% Cream 1 Application(s) Topical two times a day  Dakins Solution - 1/4 Strength 1 Application(s) Topical two times a day  dextrose 50% Injectable 25 Gram(s) IV Push once  dextrose 50% Injectable 12.5 Gram(s) IV Push once  dextrose 50% Injectable 25 Gram(s) IV Push once  furosemide    Tablet 40 milliGRAM(s) Oral two times a day  gabapentin 800 milliGRAM(s) Oral every 8 hours  glucagon  Injectable 1 milliGRAM(s) IntraMuscular once  HYDROmorphone   Tablet 4 milliGRAM(s) Oral every 4 hours  insulin lispro (ADMELOG) corrective regimen sliding scale   SubCutaneous three times a day before meals  insulin lispro (ADMELOG) corrective regimen sliding scale   SubCutaneous at bedtime  lidocaine   4% Patch 1 Patch Transdermal daily  lidocaine   4% Patch 1 Patch Transdermal daily  melatonin 3 milliGRAM(s) Oral at bedtime  multivitamin 1 Tablet(s) Oral daily  mupirocin 2% Ointment 1 Application(s) Topical two times a day  pantoprazole    Tablet 40 milliGRAM(s) Oral before breakfast  piperacillin/tazobactam IVPB.. 3.375 Gram(s) IV Intermittent every 8 hours  polyethylene glycol 3350 17 Gram(s) Oral daily  QUEtiapine 25 milliGRAM(s) Oral at bedtime  senna 2 Tablet(s) Oral at bedtime  vancomycin  IVPB 1000 milliGRAM(s) IV Intermittent every 8 hours    MEDICATIONS  (PRN):  acetaminophen     Tablet .. 650 milliGRAM(s) Oral every 6 hours PRN Temp greater or equal to 38C (100.4F), Mild Pain (1 - 3), Moderate Pain (4 - 6)  albuterol/ipratropium for Nebulization 3 milliLiter(s) Nebulizer every 6 hours PRN Shortness of Breath and/or Wheezing  dextrose Oral Gel 15 Gram(s) Oral once PRN Blood Glucose LESS THAN 70 milliGRAM(s)/deciliter  HYDROmorphone  Injectable 0.5 milliGRAM(s) IV Push every 6 hours PRN dressing changes and severe breakthrough      LABS:                        8.0    13.02 )-----------( 413      ( 07 Apr 2025 06:50 )             22.9     04-07    135  |  92[L]  |  x   ----------------------------<  x   3.7   |  x   |  x     Ca    9.1      06 Apr 2025 06:25  Phos  6.0     04-07  Mg     1.80     04-07        Urinalysis Basic - ( 06 Apr 2025 06:25 )    Color: x / Appearance: x / SG: x / pH: x  Gluc: 90 mg/dL / Ketone: x  / Bili: x / Urobili: x   Blood: x / Protein: x / Nitrite: x   Leuk Esterase: x / RBC: x / WBC x   Sq Epi: x / Non Sq Epi: x / Bacteria: x         CHIEF COMPLAINT:Patient is a 39y old  Male who presents with a chief complaint of sob (02 Mar 2025 06:19)      INTERVAL EVENTS: New ELLA this morning. Vanco stopped. C/w Zosyn and Caspofungin per ID recs. Will obtain tagged WBC scan and MRI Pelvis (pending improvement in renal fx). Complains of persistent LE pain.     ROS: Seen by bedside during AM rounds     OBJECTIVE:  ICU Vital Signs Last 24 Hrs  T(C): 36.6 (07 Apr 2025 05:20), Max: 37.7 (06 Apr 2025 21:00)  T(F): 97.9 (07 Apr 2025 05:20), Max: 99.8 (06 Apr 2025 21:00)  HR: 112 (07 Apr 2025 07:33) (104 - 126)  BP: 138/90 (07 Apr 2025 05:20) (120/68 - 140/78)  BP(mean): --  ABP: --  ABP(mean): --  RR: 18 (07 Apr 2025 05:20) (17 - 19)  SpO2: 97% (07 Apr 2025 07:33) (96% - 100%)    O2 Parameters below as of 07 Apr 2025 05:20  Patient On (Oxygen Delivery Method): BiPAP/CPAP              04-06 @ 07:01  -  04-07 @ 07:00  --------------------------------------------------------  IN: 1000 mL / OUT: 6300 mL / NET: -5300 mL      CAPILLARY BLOOD GLUCOSE      POCT Blood Glucose.: 111 mg/dL (07 Apr 2025 08:32)      PHYSICAL EXAM:  General: NAD   Neck: trachea midline.  Cards: S1/S2, no murmurs   Pulm: CTA bilaterally. No wheezes.   Abdomen: Abdomen soft. Morbidly obese abdomen. Nontender. (+) PEG noted, site c/d/i.   Extremities: 1-2+ b/l LE pitting edema. Extremities warm to touch.  Neurology: AOx3. 5/5 motor strength b/l UE. Significant weakness with inability to range b/l LE.  Follows commands. Conversational.   Skin: warm to touch, color appropriate for ethnicity. Refer to RN assessment for further details.        HOSPITAL MEDICATIONS:  MEDICATIONS  (STANDING):  amLODIPine   Tablet 10 milliGRAM(s) Oral daily  apixaban 5 milliGRAM(s) Oral every 12 hours  caspofungin IVPB 70 milliGRAM(s) IV Intermittent every 24 hours  chlorhexidine 2% Cloths 1 Application(s) Topical <User Schedule>  clotrimazole 1% Cream 1 Application(s) Topical two times a day  Dakins Solution - 1/4 Strength 1 Application(s) Topical two times a day  dextrose 50% Injectable 25 Gram(s) IV Push once  dextrose 50% Injectable 12.5 Gram(s) IV Push once  dextrose 50% Injectable 25 Gram(s) IV Push once  furosemide    Tablet 40 milliGRAM(s) Oral two times a day  gabapentin 800 milliGRAM(s) Oral every 8 hours  glucagon  Injectable 1 milliGRAM(s) IntraMuscular once  HYDROmorphone   Tablet 4 milliGRAM(s) Oral every 4 hours  insulin lispro (ADMELOG) corrective regimen sliding scale   SubCutaneous three times a day before meals  insulin lispro (ADMELOG) corrective regimen sliding scale   SubCutaneous at bedtime  lidocaine   4% Patch 1 Patch Transdermal daily  lidocaine   4% Patch 1 Patch Transdermal daily  melatonin 3 milliGRAM(s) Oral at bedtime  multivitamin 1 Tablet(s) Oral daily  mupirocin 2% Ointment 1 Application(s) Topical two times a day  pantoprazole    Tablet 40 milliGRAM(s) Oral before breakfast  piperacillin/tazobactam IVPB.. 3.375 Gram(s) IV Intermittent every 8 hours  polyethylene glycol 3350 17 Gram(s) Oral daily  QUEtiapine 25 milliGRAM(s) Oral at bedtime  senna 2 Tablet(s) Oral at bedtime  vancomycin  IVPB 1000 milliGRAM(s) IV Intermittent every 8 hours    MEDICATIONS  (PRN):  acetaminophen     Tablet .. 650 milliGRAM(s) Oral every 6 hours PRN Temp greater or equal to 38C (100.4F), Mild Pain (1 - 3), Moderate Pain (4 - 6)  albuterol/ipratropium for Nebulization 3 milliLiter(s) Nebulizer every 6 hours PRN Shortness of Breath and/or Wheezing  dextrose Oral Gel 15 Gram(s) Oral once PRN Blood Glucose LESS THAN 70 milliGRAM(s)/deciliter  HYDROmorphone  Injectable 0.5 milliGRAM(s) IV Push every 6 hours PRN dressing changes and severe breakthrough      LABS:                        8.0    13.02 )-----------( 413      ( 07 Apr 2025 06:50 )             22.9     04-07    135  |  92[L]  |  x   ----------------------------<  x   3.7   |  x   |  x     Ca    9.1      06 Apr 2025 06:25  Phos  6.0     04-07  Mg     1.80     04-07        Urinalysis Basic - ( 06 Apr 2025 06:25 )    Color: x / Appearance: x / SG: x / pH: x  Gluc: 90 mg/dL / Ketone: x  / Bili: x / Urobili: x   Blood: x / Protein: x / Nitrite: x   Leuk Esterase: x / RBC: x / WBC x   Sq Epi: x / Non Sq Epi: x / Bacteria: x

## 2025-04-07 NOTE — PROGRESS NOTE ADULT - ASSESSMENT
38 YO M with PMHx of morbid obesity, BEA on CPAP, pAFIB (not on AC), HTN, and BL papilledema attributed to pseudotumor cerebri who presented initially to [facility] on 2/24 with SOB and BL LE edema. Found with URI outpatient with progressive SOB, and concern for noted for MFPNA on imaging. Course progressed with AHRF with worsening O2 demand, respiratory distress, secretions, and somnolence requiring intubation (difficult with success after 6 attempts) in ED and admission to [facility] MICU. While in MICU, patient noted with increasing O2 demand with no improvement despite optimal vent management. Concern noted for progressive ARDS, unable to prone given morbid obesity, and ultimately cannulated for vvECMO on 2/25 and transferred to [facility] for further management on 2/26. While at [facility], tx with diuresis and ABX, and ultimately decannulated and s/p 60XLTCP trach and PEG on 3/7. Patient ultimately transferred to [facility] on 3/11 and course complicated by recurrent high grade fevers and found with fungemia.    NEUROLOGY  # Hx of Pseudotumor Cerebri   - s/p LP in 2024 with high opening pressure  - s/p MRI 2024 with possible pituitary mass but unclear because of pressure effect from pseudotumor cerebri causing partially empty sella.  - Prolactin elevated   - ACTH low but recieved steroids during admission   - FT4 low normal and TSH normal, but given FT4 low normal TSH should be higher   - Discuss endocrine consult for inhouse work up vs outpatient.     # Sedation   - Weaned off sedation in ICU   - Nimbex turned off 2/27   - Prop turned off 3/9   - Precedex turned off and catapres started 3/11  - catapres 0.2 TID (decreased 3/25) - tapered down 3/31 to 0.1 tid - discontinued 4/3     # Insomnia   - Patient worked night shift x 15 years   - Trouble sleeping at night leading to day time sleepiness and poor participation with PT  - Continue on Seroquel 25 QHS (increased 3/18)   - Continue on melatonin   - Continue on neurontin as below    # BL LE neuropathy   - CIPN concern   - Neurontin increased to 300mg Q8H with relief   - added Percocet Q4H for moderate pain (3/25) and continue on Dilaudid Q6H for severe pain   - Pain mgt called to consult - with recs for tylenol ATC, NEurontin increased to 400mg q6, Oxycodone 5 q4h prn , Dilaudid for BREAKTHROUGH pain only, Lidocaine patches on each leg, ice prn to feet  - Some improvement in pain - PT consult for TENS,   - oxycodone inc to 10mg as pt with no relief /buttocks area indurated /tender CT ordered -no abcess  - C/w Gabapentin : 600/600/800 and uptitrate as required  - Switched to PO Dilaudid 2mg Q6H PRN moderate-severe and IV Dilaudid PRN severe breakthrough and with dressing changes   - Still with severe pain 10/10 despite change to dilaudid will increase to 4mg q4h prn with iv dilaudid breakthrough  - Neurontin increased to 800mg q8h Monitor neuro status/lethergy   - I have reviewed with pt to not wait to long for dose of pain medication to  prevent pain from escalating   -Meds reviewed despite being in constant pain pt not taking dilaudid as often as he can often with 10/10 pain DIlaudid will be every 4 hours with parameters and pt can refuse Will reconsult pain mgt again for any recommendations on monday     CARDIOVASCULAR  # HTN   - HTN noted in ICU requiring cardene and esmolol GTTs   - Lisinopril dc'ed to increase catapres   - Continue on norvasc 10 and catapres 0.3 TID   - Monitor BP     # AFIB   - Hx of pAFIB   - No RVR episodes or pAFIB episodes in ICU   - No rate control medications needed  - Monitor on telemetry     # RV dilation second to PHTN   - POCUS on arrival to [facility] with RVE concern   - TTE 10/2024 with EF 55-60 with normal LVSF, moderate LVH and normal RVSF and size with no concern for pHTN   - TTE on 2/25 at  with EF 61 and normal LVSF, but enlarged RV size with reduced RVSF, mild TR, mild PHTN with PASP 49, and dilated IVC to 3.4cn, but normal TAPSE 2.1.  - Continue on aggressive diuresis in ICU    - TTE 3/11 with RVSF reduced with TAPSE 1.6. IVC improved to 2.12cm.   - Continue on lasix 40 PO BID   - Monitor IO/BMP    RESPIRATORY  # ARDS second to MFPNA   - Hx of BEA on CPAP presented to  post URI with SOB and found with AHRF with progression to ARDS second to post viral MFPNA   - Intubated 2/25   - Cannulated for vvECMO 2/26   - s/p 60XLTCP tracheostomy 3/7   - Decannulated 3/7   - CTSx removed tracheostomy and ECMO sutures on 3/17   - Continue on TC QD with PMV as able with strong phonation  - Continue on PS 18/10/40 QHS given OHS   - Continue on nebs and HTS   -  trials continue and now trialing overnight with BIPAP for OHS (10/8/12)  - Decannulated 3/28   - Using nasal cannula 4L     GI  # Dysphagia   - s/p PEG 3/7   - Attempted green dye on 3/13 and failed  - Continue on TF  - Continue on miralax and senna  - RPT green dye passed 3/19  - 3/20 Passed FEEST - on soft bite sized with thin liquids Added consistent carb 2/2 weight/Elevated A1c     # Mild transaminitis   - LFTs elevated in ICU with TBILI elevated likely from ECMO hemolysis   - US ABD with hepatic steatosis   - Trend LFTs      RENAL  # ELLA   - ELLA likely from cardiorenal with RVE   - Diuresed in ICU and improved   - Monitor renal function and UOP     # Hypernatremia   - Hypernatremia with fever spikes   - Fever curve improved and attempting to wean FWF   - Continue on  Q6H (decreased 3/19)    INFECTIOUS DISEASE  # Recurrent fevers with fungemia from unknown source   - Recurrent fevers post ECMO decannulation thought initially to be cytokine surge   - BCx, SCx, UCx, RVP and MRSA RPT on 3/12 negative   - Completed zosyn (3/12-3/18) empirically with improved WBC , however recurrent fevers noted.   - RPT SCx, UA, CXR and RVP negative  - TTE 3/17 with no IE  - PanCT 3/17 with PNA and persistent panniculitis   - BCx 3/15 and 3/16 with candida albicans.   - Concern for possible source from panniculitis   - Continue on caspo (3/17 - ) and RPT BCx 3/18 negative  - ID following  - plan for 2 week course after negative BCx to 4/2  - 3/20 Bcx- NGTD  - 3/30 spiked temp cx's sent + ua and c/s pending Bcx neg to date - started on vanco /zosyn ID following   - 4/3 - persistent fevers, leukocytosis. Will repeat CT C/A/P to evaluate for source - no source found- Bilat lung opacities mildly decreased         # MFPNA and abdominal pannus cellulitis   - Presented to  post URI with SOB and found with AHRF with progression to ARDS second to post viral MFPNA   - RVP, legionella, strept, BAL, BCx, UCx, HIV, PCP, and adenovirus PCR negative   - Initially started on multiple agents in ICU and ultimatelty completed zosyn (2/26-3/9) ZMAX (2/25-2/26) and vanco (2/26-3/1)     # R/o Sacral OM  - Noted with hard indurated sacral wound by Wound Care Team  - CT Pelvis (3/29) revaeling superficial skin thickening overlying the sacrum with underlying edema of the subcutaneous fat, in keeping with the clinical history of sacral wound. No evidence of underlying tract or drainable fluid collection.  - CT 4/3 - no fluid collection in sacral tissue     # Penile discharge   - GC/ chlamydia negative   - HIV negative   - Monitor for now    # PPX   - MRSA PCR positive and completed bactroban (2/26-3/3)     HEME   # Anemia likely second to ECMO circuit vs AOCD   - HH low in ICU second to ECMO circuit   - HH dropping again today however no bleeding noted   - Microcytic and hemochromic, sent anemia panel 3/19  - Trend HH     VASCULAR   # R IJ and BL LE DVTs   - Post ECMO dopplers on 3/9 negative for BL UE/ LE DVTs.   - Subsequent post ECMO dopplers performed on 3/14 and noted with acute, non-occlusive deep vein thrombosis noted within the right internal jugular vein. acute, occlusive deep vein thromboses noted within the right and left peroneal veins at both mid calves, and age-indeterminate, occlusive deep vein thrombosis visualized within the left gastrocnemius vein at the proximal calf.     - Continue on argatroban GTT and change to eliquis     PODIATRY   # BL plantar feet blisters likely pressors induced  - Seen by podiatry and blisters lanced on 3/4 and again on 3/18  - Continue on local wound care per podiatry   - Podiatry following  - OK for WBAT will fu with PT for offloading shoe if appropriate  - Pain mgt consult   - Podiatry FU 3/28 / 4/3 done /following     ENDOCRINE  # Pre-DM2   - A1C 6.7  - Continue on ISS   - Monitor FS   - IF no demand then stop ISS     LINES/ TUBES  - R IJ and R FEM ECMO (2/26-3/7)     SKIN  # Pannus canndial intertrigo   # Sacrum/ buttock MAD  - Continue on clotrimazole cream   - WOC appreciated   -Seen by WC pt noted to have oozing from Buttock wound surgicel placed by wound care On follow up no further bleeding  - Wound care placed orders   - No bleeding from wound     ETHICS/ GOC    - FULL CODE     DISPO - PT following  Seen by PMR for acute rehab per recs   40 YO M with PMHx of morbid obesity, BEA on CPAP, pAFIB (not on AC), HTN, and BL papilledema attributed to pseudotumor cerebri who presented initially to [facility] on 2/24 with SOB and BL LE edema. Found with URI outpatient with progressive SOB, and concern for noted for MFPNA on imaging. Course progressed with AHRF with worsening O2 demand, respiratory distress, secretions, and somnolence requiring intubation (difficult with success after 6 attempts) in ED and admission to [facility] MICU. While in MICU, patient noted with increasing O2 demand with no improvement despite optimal vent management. Concern noted for progressive ARDS, unable to prone given morbid obesity, and ultimately cannulated for vvECMO on 2/25 and transferred to [facility] for further management on 2/26. While at [facility], tx with diuresis and ABX, and ultimately decannulated and s/p 60XLTCP trach and PEG on 3/7. Patient ultimately transferred to [facility] on 3/11 and course complicated by recurrent high grade fevers and found with fungemia.    NEUROLOGY  # Hx of Pseudotumor Cerebri   - s/p LP in 2024 with high opening pressure  - s/p MRI 2024 with possible pituitary mass but unclear because of pressure effect from pseudotumor cerebri causing partially empty sella.  - Prolactin elevated   - ACTH low but recieved steroids during admission   - FT4 low normal and TSH normal, but given FT4 low normal TSH should be higher   - Discuss endocrine consult for inhouse work up vs outpatient.     # Sedation   - Weaned off sedation in ICU   - Nimbex turned off 2/27   - Prop turned off 3/9   - Precedex turned off and catapres started 3/11  - catapres 0.2 TID (decreased 3/25) - tapered down 3/31 to 0.1 tid - discontinued 4/3     # Insomnia   - Patient worked night shift x 15 years   - Trouble sleeping at night leading to day time sleepiness and poor participation with PT  - Continue on Seroquel 25 QHS (increased 3/18)   - Continue on melatonin   - Continue on neurontin as below    # BL LE neuropathy   - CIPN concern   - Neurontin increased to 300mg Q8H with relief   - added Percocet Q4H for moderate pain (3/25) and continue on Dilaudid Q6H for severe pain   - Pain mgt called to consult - with recs for tylenol ATC, NEurontin increased to 400mg q6, Oxycodone 5 q4h prn , Dilaudid for BREAKTHROUGH pain only, Lidocaine patches on each leg, ice prn to feet  - Some improvement in pain - PT consult for TENS,   - oxycodone inc to 10mg as pt with no relief /buttocks area indurated /tender CT ordered -no abcess  - C/w Gabapentin : 600/600/800 and uptitrate as required  - Switched to PO Dilaudid 2mg Q6H PRN moderate-severe and IV Dilaudid PRN severe breakthrough and with dressing changes   - Still with severe pain 10/10 despite change to dilaudid will increase to 4mg q4h prn with iv dilaudid breakthrough  - Neurontin increased to 800mg q8h Monitor neuro status/lethergy   - I have reviewed with pt to not wait to long for dose of pain medication to  prevent pain from escalating   -Meds reviewed despite being in constant pain pt not taking dilaudid as often as he can often with 10/10 pain DIlaudid will be every 4 hours with parameters and pt can refuse Will reconsult pain mgt again for any recommendations on monday     CARDIOVASCULAR  # HTN   - HTN noted in ICU requiring cardene and esmolol GTTs   - Lisinopril dc'ed to increase catapres   - Continue on norvasc 10 and catapres 0.3 TID   - Monitor BP     # AFIB   - Hx of pAFIB   - No RVR episodes or pAFIB episodes in ICU   - No rate control medications needed  - Monitor on telemetry     # RV dilation second to PHTN   - POCUS on arrival to [facility] with RVE concern   - TTE 10/2024 with EF 55-60 with normal LVSF, moderate LVH and normal RVSF and size with no concern for pHTN   - TTE on 2/25 at  with EF 61 and normal LVSF, but enlarged RV size with reduced RVSF, mild TR, mild PHTN with PASP 49, and dilated IVC to 3.4cn, but normal TAPSE 2.1.  - Continue on aggressive diuresis in ICU    - TTE 3/11 with RVSF reduced with TAPSE 1.6. IVC improved to 2.12cm.   - Continue on lasix 40 PO BID   - Monitor IO/BMP    RESPIRATORY  # ARDS second to MFPNA   - Hx of BEA on CPAP presented to  post URI with SOB and found with AHRF with progression to ARDS second to post viral MFPNA   - Intubated 2/25   - Cannulated for vvECMO 2/26   - s/p 60XLTCP tracheostomy 3/7   - Decannulated 3/7   - CTSx removed tracheostomy and ECMO sutures on 3/17   - Continue on TC QD with PMV as able with strong phonation  - Continue on PS 18/10/40 QHS given OHS   - Continue on nebs and HTS   -  trials continue and now trialing overnight with BIPAP for OHS (10/8/12)  - Decannulated 3/28   - Using nasal cannula 4L     GI  # Dysphagia   - s/p PEG 3/7   - Attempted green dye on 3/13 and failed  - Continue on TF  - Continue on miralax and senna  - RPT green dye passed 3/19  - 3/20 Passed FEEST - on soft bite sized with thin liquids Added consistent carb 2/2 weight/Elevated A1c     # Mild transaminitis   - LFTs elevated in ICU with TBILI elevated likely from ECMO hemolysis   - US ABD with hepatic steatosis   - Trend LFTs      RENAL  # ELLA   - ELLA likely from cardiorenal with RVE   - Diuresed in ICU and improved   - Monitor renal function and UOP   - Recurrent ELLA noted suspected in s/o vancomycin toxicity (4/7) now pending urine studies/UA and will attempt IV hydration     # Hypernatremia   - Hypernatremia with fever spikes   - Fever curve improved and attempting to wean FWF   - Continue on  Q6H (decreased 3/19)    INFECTIOUS DISEASE  # Recurrent fevers with fungemia from unknown source   - Recurrent fevers post ECMO decannulation thought initially to be cytokine surge   - BCx, SCx, UCx, RVP and MRSA RPT on 3/12 negative   - Completed zosyn (3/12-3/18) empirically with improved WBC , however recurrent fevers noted.   - RPT SCx, UA, CXR and RVP negative  - TTE 3/17 with no IE  - PanCT 3/17 with PNA and persistent panniculitis   - BCx 3/15 and 3/16 with candida albicans.   - Concern for possible source from panniculitis   - Continue on caspo (3/17 - ) and RPT BCx 3/18 negative  - ID following  - plan for 2 week course after negative BCx to 4/2  - 3/20 Bcx- NGTD  - 3/30 spiked temp cx's sent + ua and c/s pending Bcx neg to date - started on vanco /zosyn ID following  - Vanco stopped (4/7) and will c/w empiric Zosyn/Caspofungin for now   - 4/3 - persistent fevers, leukocytosis. Will repeat CT C/A/P to evaluate for source - no source found- Bilat lung opacities mildly decreased         # MFPNA and abdominal pannus cellulitis   - Presented to  post URI with SOB and found with AHRF with progression to ARDS second to post viral MFPNA   - RVP, legionella, strept, BAL, BCx, UCx, HIV, PCP, and adenovirus PCR negative   - Initially started on multiple agents in ICU and ultimatelty completed zosyn (2/26-3/9) ZMAX (2/25-2/26) and vanco (2/26-3/1)     # R/o Sacral OM  - Noted with hard indurated sacral wound by Wound Care Team  - CT Pelvis (3/29) revaeling superficial skin thickening overlying the sacrum with underlying edema of the subcutaneous fat, in keeping with the clinical history of sacral wound. No evidence of underlying tract or drainable fluid collection.  - CT 4/3 - no fluid collection in sacral tissue   - Given ongoing fever with no clear source with obtain MRI Pelvis per ID recs to r/o sacral OM once renal fx improves    # Penile discharge   - GC/ chlamydia negative   - HIV negative   - Monitor for now    # PPX   - MRSA PCR positive and completed bactroban (2/26-3/3)     HEME   # Anemia likely second to ECMO circuit vs AOCD   - HH low in ICU second to ECMO circuit   - HH dropping again today however no bleeding noted   - Microcytic and hemochromic, sent anemia panel 3/19  - Trend      VASCULAR   # R IJ and BL LE DVTs   - Post ECMO dopplers on 3/9 negative for BL UE/ LE DVTs.   - Subsequent post ECMO dopplers performed on 3/14 and noted with acute, non-occlusive deep vein thrombosis noted within the right internal jugular vein. acute, occlusive deep vein thromboses noted within the right and left peroneal veins at both mid calves, and age-indeterminate, occlusive deep vein thrombosis visualized within the left gastrocnemius vein at the proximal calf.     - Continue on argatroban GTT and change to eliquis     PODIATRY   # BL plantar feet blisters likely pressors induced  - Seen by podiatry and blisters lanced on 3/4 and again on 3/18  - Continue on local wound care per podiatry   - Podiatry following  - OK for WBAT will fu with PT for offloading shoe if appropriate  - Pain mgt consult   - Podiatry FU 3/28 / 4/3 done /following     ENDOCRINE  # Pre-DM2   - A1C 6.7  - Continue on ISS   - Monitor FS   - IF no demand then stop ISS     LINES/ TUBES  - R IJ and R FEM ECMO (2/26-3/7)     SKIN  # Pannus canndial intertrigo   # Sacrum/ buttock MAD  - Continue on clotrimazole cream   - WOC appreciated   -Seen by WC pt noted to have oozing from Buttock wound surgicel placed by wound care On follow up no further bleeding  - Wound care placed orders   - No bleeding from wound     ETHICS/ GOC    - FULL CODE     DISPO - PT following  Seen by PMR for acute rehab per recs

## 2025-04-07 NOTE — PROGRESS NOTE ADULT - ASSESSMENT
This is a 38 y/o M w/ PMHx AF (not on AC), HTN, BEA, pseudotumor cerebri s/p LP in 2024, initially presented to [facility] on 2/24, SOB, LE edema with URI symptoms and sick contacts, noted to have severe ARDS, intubated however still hypoxia, cannulated for VV ECMO on 2/25, transferred to [facility] on 2/25, started on empiric Vanco, Zosyn for multifocal PNA, abdominal wall cellulitis, s/p trach placement by CT surgery on 3/7, ECMO decannulated on 3/7. Zosyn held on 3/9.  C/b fevers on 3/10, restarted on Zosyn, transferred to [facility] on 3/13, Bcx now w/ yeast.    #Candida albicans fungemia   #Fevers   #Multifocal PNA, abdominal wall cellulitis s/p Vanco/Zosyn  #ARDS, hypoxic respiratory failure requiring VV EMCO 2/2 multifocal PNA? s/p decannulation on 3/7    Overall, 38 y/o M w/ PMHx AF (not on AC), HTN, BEA, pseudotumor cerebri s/p LP in 2024 admitted to [facility] on 2/26 for ARDS, hypoxic respiratory failure requiring VV EMCO 2/2 multifocal PNA? s/p decannulation on 3/7, c/b abdominal wall cellulitis s/p Vancomycin/Zosyn, s/p trach on 3/7, in the setting of persistent fevers despite Zosyn, BCx now w/ yeast, Candida albicans   Unclear source for yeast in BCx, pt had all central lines removed on 3/7, not getting TPN, no abdominal pain on exam, PEG site well appearing.     CT A/P w/o clear abdominal source of fungemia, possibly 2/2 abdominal wall cellulitis? TTE w/o IE.  BCx 3/18 1/2 positive, 3/20 NGTD x 2.     Pt with worsening sepsis on 3/30, febrile to 103, WBC 16.   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   Pt with deep tunneled wound to left, no drainage, malodor per wound care.   CT A/P w/ b/l pulmonary opacities but improved, no sacral abscess     Recommendations;   1. Stop Vancomycin, continue with Zosyn 3.375 g 8 for now. If Bcx remain negative, will likely stop Vancomycin after weekend   2. Caspofungin 70 mg daily for now  3. Would obtain WBC scan given sepsis w/o clear source  4. Obtain MRI pelvis to eval for OM, would treat for 6 weeks if positive.     Thank you for consulting us and involving us in the management of this patient's case. In addition to reviewing history, imaging, documents, labs, microbiology, and infection control strategies and potential issues.     ID will continue to follow    Shailesh Owens M.D.  Attending Physician  Division of Infectious Diseases  Department of Medicine    Please contact through MS Teams message.  Office: [phone] (after 5 PM or weekend)

## 2025-04-07 NOTE — PROGRESS NOTE ADULT - NS ATTEND AMEND GEN_ALL_CORE FT
Pt is a 39M with MHx obesity (Class III, BMI 69), pAfib (no AC), HTN, BEA (dz'ed 2019, AHI 34 non-compliant on CPAP set at 10), and history of b/l papilledema attributed to pseudotumor cerebri initially presenting as a transfer [facility] on 2/26 for acute hypoxemic respiratory failure s/p ETT intubation/MV with progression to refractory ARDS s/p initiation of vv-ECMO support (2/26-3/7) with failure to wean from ventilator s/p tracheostomy placement, further found to have RV failure s/p aggressive diuresis and PNA followed by severe sepsis 2/2 panniculitis c/b candida albicans fungemia now transferred to [facility] for further medical management.     Pt now awake and alert, spontaneously communicative and at mental status baseline. Continues to have severe functional debility likely 2/2 prolonged critical illness polyneuropathy but is progressing well. Now able to transfer via [place] to chair daily and is independent of UE and fine motor function. Off all sedation. Weaning pain control, pt remains dependent on Dilaudid prn for LE neuropathic pain. PO regimen added and gabapentin increased without benefit, now on 800mg TID. Dilaudid increased in frequency/dose given persistent uncontrolled pain with improvement. Will consider transition to fentanyl patch. Pain mgmt team consulted, recs appreciated. Podiatry consulted, possible worsening of right 5th digit that may require podiatric sx. Will CTM for now. No evidence of active infectious process in feet.    Pt with acute combined hypoxemic and hypercapnic respiratory failure with failure to wean from ventilator s/p tracheostomy placement. Pt was tolerating TC QD, PSV (18/10) qhs. Airway clearance therapy in place. Wean O2 supplementation for goal O2 saturation 90-95%. Aspiration precautions and oral hygiene in place. Decannulated at bedside 3/28, continues to use Bilevel NIV qhs. Serial ABGs wnl.     Pt initially p/w RV failure 2/2 pulmHTN now s/p aggressive diuresis with improvement. Now transitioned to PO diuretics with goal to maintain euvolemia. Acetazolamide added today. AFib well controlled conservatively, will continue to monitor on telemetry. TTE 2/25 with new RVE, 3/11 with RVSD. DVT noted on repeat duplex of the right IJ and bilateral peroneal veins. Will c/w full A/C on Eliquis for DVT and AFib.     Pt with oropharyngeal dysphagia s/p PEG placement (3/7) now transitioned to PO diet (3/20) via FEEST. Will continue to monitor on regular PO diet. Bowel regimen in place prn. PPI ppx. Transaminitis improving. Pt with newly diagnosed hepatomegaly.     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 TTE (-) endocarditis. Wound care team following. ID recs appreciated. Further found to have worsening sacral ulceration with induration, CT A/P and MRI spine performed without evidence of underlying tract or drainable fluid collection. Now with acute onset fevers, although without new s/s or complaints and with unclear source. Cx resent, empiric abx initiated with improvement but cx NTD. Fever curve downtrending. Wound care team evaluated and is concerned for worsening tunnelling of sacral wound but CT imaging (-) concern for osteomyelitis or occult infectious sources. Discussed with ID, given persistent sepsis of unclear primary etiology will obtain wagged WBC scan and MRI sacrum.       Dispo pending medical optimization. Pt full code. DVT ppx in place. Palliative consulted, recs appreciated. Will continue to update family and discuss plan of care throughout hospitalization. Dispo planning to acute rehab initiated.

## 2025-04-08 LAB
ANION GAP SERPL CALC-SCNC: 11 MMOL/L — SIGNIFICANT CHANGE UP (ref 7–14)
APPEARANCE UR: CLEAR — SIGNIFICANT CHANGE UP
BACTERIA # UR AUTO: NEGATIVE /HPF — SIGNIFICANT CHANGE UP
BASOPHILS # BLD AUTO: 0.02 K/UL — SIGNIFICANT CHANGE UP (ref 0–0.2)
BASOPHILS NFR BLD AUTO: 0.1 % — SIGNIFICANT CHANGE UP (ref 0–2)
BILIRUB UR-MCNC: NEGATIVE — SIGNIFICANT CHANGE UP
BUN SERPL-MCNC: 12 MG/DL — SIGNIFICANT CHANGE UP (ref 7–23)
CALCIUM SERPL-MCNC: 9.2 MG/DL — SIGNIFICANT CHANGE UP (ref 8.4–10.5)
CAST: 3 /LPF — SIGNIFICANT CHANGE UP (ref 0–4)
CHLORIDE SERPL-SCNC: 93 MMOL/L — LOW (ref 98–107)
CO2 SERPL-SCNC: 32 MMOL/L — HIGH (ref 22–31)
COLOR SPEC: YELLOW — SIGNIFICANT CHANGE UP
CREAT ?TM UR-MCNC: 30 MG/DL — SIGNIFICANT CHANGE UP
CREAT SERPL-MCNC: 1.84 MG/DL — HIGH (ref 0.5–1.3)
DIFF PNL FLD: NEGATIVE — SIGNIFICANT CHANGE UP
EGFR: 48 ML/MIN/1.73M2 — LOW
EGFR: 48 ML/MIN/1.73M2 — LOW
EOSINOPHIL # BLD AUTO: 0.28 K/UL — SIGNIFICANT CHANGE UP (ref 0–0.5)
EOSINOPHIL NFR BLD AUTO: 2 % — SIGNIFICANT CHANGE UP (ref 0–6)
EPI CELLS # UR: PRESENT
GLUCOSE BLDC GLUCOMTR-MCNC: 100 MG/DL — HIGH (ref 70–99)
GLUCOSE BLDC GLUCOMTR-MCNC: 100 MG/DL — HIGH (ref 70–99)
GLUCOSE BLDC GLUCOMTR-MCNC: 104 MG/DL — HIGH (ref 70–99)
GLUCOSE BLDC GLUCOMTR-MCNC: 93 MG/DL — SIGNIFICANT CHANGE UP (ref 70–99)
GLUCOSE SERPL-MCNC: 92 MG/DL — SIGNIFICANT CHANGE UP (ref 70–99)
GLUCOSE UR QL: NEGATIVE MG/DL — SIGNIFICANT CHANGE UP
HCT VFR BLD CALC: 24.9 % — LOW (ref 39–50)
HGB BLD-MCNC: 8 G/DL — LOW (ref 13–17)
IANC: 10.21 K/UL — HIGH (ref 1.8–7.4)
IMM GRANULOCYTES NFR BLD AUTO: 1.2 % — HIGH (ref 0–0.9)
KETONES UR-MCNC: NEGATIVE MG/DL — SIGNIFICANT CHANGE UP
LEUKOCYTE ESTERASE UR-ACNC: NEGATIVE — SIGNIFICANT CHANGE UP
LYMPHOCYTES # BLD AUTO: 14.9 % — SIGNIFICANT CHANGE UP (ref 13–44)
LYMPHOCYTES # BLD AUTO: 2.07 K/UL — SIGNIFICANT CHANGE UP (ref 1–3.3)
MAGNESIUM SERPL-MCNC: 1.8 MG/DL — SIGNIFICANT CHANGE UP (ref 1.6–2.6)
MCHC RBC-ENTMCNC: 28.3 PG — SIGNIFICANT CHANGE UP (ref 27–34)
MCHC RBC-ENTMCNC: 32.1 G/DL — SIGNIFICANT CHANGE UP (ref 32–36)
MCV RBC AUTO: 88 FL — SIGNIFICANT CHANGE UP (ref 80–100)
MONOCYTES # BLD AUTO: 1.16 K/UL — HIGH (ref 0–0.9)
MONOCYTES NFR BLD AUTO: 8.3 % — SIGNIFICANT CHANGE UP (ref 2–14)
NEUTROPHILS # BLD AUTO: 10.21 K/UL — HIGH (ref 1.8–7.4)
NEUTROPHILS NFR BLD AUTO: 73.5 % — SIGNIFICANT CHANGE UP (ref 43–77)
NITRITE UR-MCNC: NEGATIVE — SIGNIFICANT CHANGE UP
NRBC # BLD AUTO: 0 K/UL — SIGNIFICANT CHANGE UP (ref 0–0)
NRBC # FLD: 0 K/UL — SIGNIFICANT CHANGE UP (ref 0–0)
NRBC BLD AUTO-RTO: 0 /100 WBCS — SIGNIFICANT CHANGE UP (ref 0–0)
PH UR: 6 — SIGNIFICANT CHANGE UP (ref 5–8)
PHOSPHATE SERPL-MCNC: 6.1 MG/DL — HIGH (ref 2.5–4.5)
PLATELET # BLD AUTO: 422 K/UL — HIGH (ref 150–400)
POTASSIUM SERPL-MCNC: 3.7 MMOL/L — SIGNIFICANT CHANGE UP (ref 3.5–5.3)
POTASSIUM SERPL-SCNC: 3.7 MMOL/L — SIGNIFICANT CHANGE UP (ref 3.5–5.3)
PROT UR-MCNC: NEGATIVE MG/DL — SIGNIFICANT CHANGE UP
RBC # BLD: 2.83 M/UL — LOW (ref 4.2–5.8)
RBC # FLD: 23.6 % — HIGH (ref 10.3–14.5)
RBC CASTS # UR COMP ASSIST: 1 /HPF — SIGNIFICANT CHANGE UP (ref 0–4)
REVIEW: SIGNIFICANT CHANGE UP
SODIUM SERPL-SCNC: 136 MMOL/L — SIGNIFICANT CHANGE UP (ref 135–145)
SODIUM UR-SCNC: <20 MMOL/L — SIGNIFICANT CHANGE UP
SP GR SPEC: 1 — LOW (ref 1–1.03)
SQUAMOUS # UR AUTO: 0 /HPF — SIGNIFICANT CHANGE UP (ref 0–5)
UROBILINOGEN FLD QL: 1 MG/DL — SIGNIFICANT CHANGE UP (ref 0.2–1)
UUN UR-MCNC: 88.3 MG/DL — SIGNIFICANT CHANGE UP
WBC # BLD: 13.91 K/UL — HIGH (ref 3.8–10.5)
WBC # FLD AUTO: 13.91 K/UL — HIGH (ref 3.8–10.5)
WBC UR QL: 0 /HPF — SIGNIFICANT CHANGE UP (ref 0–5)

## 2025-04-08 PROCEDURE — 78802 RP LOCLZJ TUM WHBDY 1 D IMG: CPT | Mod: 26

## 2025-04-08 PROCEDURE — 99232 SBSQ HOSP IP/OBS MODERATE 35: CPT

## 2025-04-08 PROCEDURE — 99233 SBSQ HOSP IP/OBS HIGH 50: CPT

## 2025-04-08 PROCEDURE — G0545: CPT

## 2025-04-08 RX ORDER — PIPERACILLIN-TAZO-DEXTROSE,ISO 2.25G/50ML
3.38 IV SOLUTION, PIGGYBACK PREMIX FROZEN(ML) INTRAVENOUS EVERY 8 HOURS
Refills: 0 | Status: DISCONTINUED | OUTPATIENT
Start: 2025-04-08 | End: 2025-04-11

## 2025-04-08 RX ORDER — SODIUM CHLORIDE 9 G/1000ML
1000 INJECTION, SOLUTION INTRAVENOUS
Refills: 0 | Status: COMPLETED | OUTPATIENT
Start: 2025-04-08 | End: 2025-04-08

## 2025-04-08 RX ORDER — PIPERACILLIN-TAZO-DEXTROSE,ISO 2.25G/50ML
3.38 IV SOLUTION, PIGGYBACK PREMIX FROZEN(ML) INTRAVENOUS ONCE
Refills: 0 | Status: DISCONTINUED | OUTPATIENT
Start: 2025-04-08 | End: 2025-04-08

## 2025-04-08 RX ADMIN — CASPOFUNGIN ACETATE 260 MILLIGRAM(S): 5 INJECTION, POWDER, LYOPHILIZED, FOR SOLUTION INTRAVENOUS at 06:44

## 2025-04-08 RX ADMIN — Medication 25 GRAM(S): at 21:43

## 2025-04-08 RX ADMIN — SODIUM HYPOCHLORITE 1 APPLICATION(S): 0.12 SOLUTION TOPICAL at 17:13

## 2025-04-08 RX ADMIN — Medication 0.5 MILLIGRAM(S): at 02:55

## 2025-04-08 RX ADMIN — GABAPENTIN 800 MILLIGRAM(S): 400 CAPSULE ORAL at 06:05

## 2025-04-08 RX ADMIN — Medication 4 MILLIGRAM(S): at 17:12

## 2025-04-08 RX ADMIN — Medication 2 TABLET(S): at 21:43

## 2025-04-08 RX ADMIN — MUPIROCIN CALCIUM 1 APPLICATION(S): 20 CREAM TOPICAL at 17:13

## 2025-04-08 RX ADMIN — LIDOCAINE HYDROCHLORIDE 1 PATCH: 20 JELLY TOPICAL at 20:37

## 2025-04-08 RX ADMIN — Medication 0.5 MILLIGRAM(S): at 12:07

## 2025-04-08 RX ADMIN — LIDOCAINE HYDROCHLORIDE 1 PATCH: 20 JELLY TOPICAL at 11:45

## 2025-04-08 RX ADMIN — Medication 4 MILLIGRAM(S): at 10:44

## 2025-04-08 RX ADMIN — Medication 0.5 MILLIGRAM(S): at 18:20

## 2025-04-08 RX ADMIN — Medication 4 MILLIGRAM(S): at 06:05

## 2025-04-08 RX ADMIN — SODIUM CHLORIDE 150 MILLILITER(S): 9 INJECTION, SOLUTION INTRAVENOUS at 18:20

## 2025-04-08 RX ADMIN — Medication 0.5 MILLIGRAM(S): at 12:37

## 2025-04-08 RX ADMIN — Medication 4 MILLIGRAM(S): at 21:44

## 2025-04-08 RX ADMIN — Medication 4 MILLIGRAM(S): at 17:42

## 2025-04-08 RX ADMIN — GABAPENTIN 800 MILLIGRAM(S): 400 CAPSULE ORAL at 21:44

## 2025-04-08 RX ADMIN — APIXABAN 5 MILLIGRAM(S): 2.5 TABLET, FILM COATED ORAL at 17:12

## 2025-04-08 RX ADMIN — Medication 4 MILLIGRAM(S): at 03:25

## 2025-04-08 RX ADMIN — Medication 1 APPLICATION(S): at 06:07

## 2025-04-08 RX ADMIN — SODIUM HYPOCHLORITE 1 APPLICATION(S): 0.12 SOLUTION TOPICAL at 06:04

## 2025-04-08 RX ADMIN — CLOTRIMAZOLE 1 APPLICATION(S): 1 CREAM TOPICAL at 17:13

## 2025-04-08 RX ADMIN — CLOTRIMAZOLE 1 APPLICATION(S): 1 CREAM TOPICAL at 06:07

## 2025-04-08 RX ADMIN — Medication 40 MILLIGRAM(S): at 06:43

## 2025-04-08 RX ADMIN — Medication 3 MILLIGRAM(S): at 21:44

## 2025-04-08 RX ADMIN — LIDOCAINE HYDROCHLORIDE 1 PATCH: 20 JELLY TOPICAL at 00:22

## 2025-04-08 RX ADMIN — Medication 4 MILLIGRAM(S): at 22:36

## 2025-04-08 RX ADMIN — POLYETHYLENE GLYCOL 3350 17 GRAM(S): 17 POWDER, FOR SOLUTION ORAL at 11:44

## 2025-04-08 RX ADMIN — Medication 4 MILLIGRAM(S): at 15:41

## 2025-04-08 RX ADMIN — APIXABAN 5 MILLIGRAM(S): 2.5 TABLET, FILM COATED ORAL at 06:05

## 2025-04-08 RX ADMIN — LIDOCAINE HYDROCHLORIDE 1 PATCH: 20 JELLY TOPICAL at 23:37

## 2025-04-08 RX ADMIN — GABAPENTIN 800 MILLIGRAM(S): 400 CAPSULE ORAL at 15:41

## 2025-04-08 RX ADMIN — QUETIAPINE FUMARATE 25 MILLIGRAM(S): 25 TABLET ORAL at 21:43

## 2025-04-08 RX ADMIN — Medication 4 MILLIGRAM(S): at 11:14

## 2025-04-08 RX ADMIN — MUPIROCIN CALCIUM 1 APPLICATION(S): 20 CREAM TOPICAL at 06:06

## 2025-04-08 RX ADMIN — Medication 4 MILLIGRAM(S): at 16:11

## 2025-04-08 RX ADMIN — Medication 1 TABLET(S): at 11:44

## 2025-04-08 RX ADMIN — Medication 25 GRAM(S): at 10:45

## 2025-04-08 RX ADMIN — AMLODIPINE BESYLATE 10 MILLIGRAM(S): 10 TABLET ORAL at 06:05

## 2025-04-08 NOTE — PROGRESS NOTE ADULT - SUBJECTIVE AND OBJECTIVE BOX
Infectious Diseases Follow Up:    Patient is a 39y old  Male who presents with a chief complaint of sob (02 Mar 2025 06:19)      Interval History/ROS:  No acute events noted, afebrile, WBC stable, slightly increased   Pt denies any acute complaints     Allergies  No Known Allergies        ANTIMICROBIALS:  caspofungin IVPB 70 every 24 hours  piperacillin/tazobactam IVPB.. 3.375 every 8 hours      Current Abx:     Previous Abx     OTHER MEDS:  MEDICATIONS  (STANDING):  acetaminophen     Tablet .. 650 every 6 hours PRN  albuterol/ipratropium for Nebulization 3 every 6 hours PRN  amLODIPine   Tablet 10 daily  apixaban 5 every 12 hours  dextrose 50% Injectable 25 once  dextrose 50% Injectable 12.5 once  dextrose 50% Injectable 25 once  dextrose Oral Gel 15 once PRN  gabapentin 800 every 8 hours  glucagon  Injectable 1 once  HYDROmorphone   Tablet 4 every 4 hours  HYDROmorphone  Injectable 0.5 every 6 hours PRN  insulin lispro (ADMELOG) corrective regimen sliding scale  three times a day before meals  insulin lispro (ADMELOG) corrective regimen sliding scale  at bedtime  melatonin 3 at bedtime  pantoprazole    Tablet 40 before breakfast  polyethylene glycol 3350 17 daily  QUEtiapine 25 at bedtime  senna 2 at bedtime      Vital Signs Last 24 Hrs  T(C): 36.9 (2025 10:30), Max: 37 (2025 06:02)  T(F): 98.5 (2025 10:30), Max: 98.6 (2025 06:02)  HR: 111 (2025 10:30) (110 - 118)  BP: 126/66 (2025 10:30) (126/66 - 153/99)  BP(mean): --  RR: 19 (2025 10:30) (18 - 20)  SpO2: 97% (2025 10:30) (91% - 100%)    Parameters below as of 2025 10:30  Patient On (Oxygen Delivery Method): nasal cannula  O2 Flow (L/min): 2      PHYSICAL EXAM:  GENERAL: NAD  HEAD:  Atraumatic, Normocephalic  EYES: EOMI, conjunctiva and sclera clear  CHEST/LUNG: On NC, CTAB  HEART: RRR  ABDOMEN: Soft, Nontender, Nondistended;  Extremities: B/l feet wrapped   PSYCH: AAOx3                                     8.0    13.91 )-----------( 422      ( 2025 06:10 )             24.9       04-    136  |  93[L]  |  12  ----------------------------<  92  3.7   |  32[H]  |  1.84[H]    Ca    9.2      2025 06:10  Phos  6.1       Mg     1.80     -        Urinalysis Basic - ( 2025 07:30 )    Color: Yellow / Appearance: Clear / S.003 / pH: x  Gluc: x / Ketone: Negative mg/dL  / Bili: Negative / Urobili: 1.0 mg/dL   Blood: x / Protein: Negative mg/dL / Nitrite: Negative   Leuk Esterase: Negative / RBC: 1 /HPF / WBC 0 /HPF   Sq Epi: x / Non Sq Epi: 0 /HPF / Bacteria: Negative /HPF        MICROBIOLOGY:  v  Blood Blood-Venous  25   No growth at 24 hours  --  --      Blood Blood-Peripheral  25   No growth at 24 hours  --  --      Clean Catch Clean Catch (Midstream)  25   <10,000 CFU/mL Normal Urogenital Kyle  --  --      Blood Blood-Peripheral  25   No growth at 5 days  --  --      Trach Asp Tracheal Aspirate  25   Commensal kyle consistent with body site  --    Few polymorphonuclear leukocytes per low power field  No Squamous epithelial cells per low power field  No organisms seen per oil power field      Blood Blood-Peripheral  25   No growth at 5 days  --  --      Blood Blood-Venous  25   No growth at 5 days  --  --      Blood Blood-Peripheral  25   No growth at 5 days  --  Blood Culture PCR  Candida albicans      Nares/Axilla/Groin Nares/Axilla/Groin  25   Culture NEGATIVE for Candida auris.  This surveillance culture is intended for Infection Control purposes only.  A negative result does not preclude the carriage of other fungal  organisms.  --  --      Blood Blood-Venous  25   Growth in aerobic bottle: Candida albicans  See previous culture [id number]  --    Growth in aerobic bottle: Yeast like cells      Blood Blood-Peripheral  25   Growth in aerobic bottle: Candida albicans  See previous culture [id number]  --    Growth in aerobic bottle: Yeast like cells      Trach Asp Tracheal Aspirate  25   Commensal kyle consistent with body site  --    Few polymorphonuclear leukocytes per low power field  Rare Squamous epithelial cells per low power field  Rare Gram Positive Rods seen per oil power field  Rare Gram Negative Rods seen per oil power field      Blood Blood-Venous  03-15-25   Growth in aerobic bottle: Candida albicans  See previous culture [id number]  --    Growth in aerobic bottle: Yeast like cells      Blood Blood-Peripheral  03-15-25   Growth in aerobic bottle: Candida albicans  Direct identification is available within approximately 3-5  hours either by Blood Panel Multiplexed PCR or Direct  MALDI-TOF. Details: [email or web address]  --  Blood Culture PCR  Candida albicans      Catheterized Catheterized  25   <10,000 CFU/mL Normal Urogenital Kyle  --  --      Blood Blood-Peripheral  25   No growth at 5 days  --  --      Blood Blood-Peripheral  25   No growth at 5 days  --  --      Trach Asp Tracheal Aspirate  03-10-25   Commensal kyle consistent with body site  --    Few polymorphonuclear leukocytes per low power field  No Squamous epithelial cells per low power field  No organisms seen per oil power field                RADIOLOGY:

## 2025-04-08 NOTE — PROGRESS NOTE ADULT - NS ATTEND AMEND GEN_ALL_CORE FT
Pt is a 39M with MHx obesity (Class III, BMI 69), pAfib (no AC), HTN, BEA (dz'ed 2019, AHI 34 non-compliant on CPAP set at 10), and history of b/l papilledema attributed to pseudotumor cerebri initially presenting as a transfer [facility] on 2/26 for acute hypoxemic respiratory failure s/p ETT intubation/MV with progression to refractory ARDS s/p initiation of vv-ECMO support (2/26-3/7) with failure to wean from ventilator s/p tracheostomy placement, further found to have RV failure s/p aggressive diuresis and PNA followed by severe sepsis 2/2 panniculitis c/b candida albicans fungemia now transferred to [facility] for further medical management.     Pt now awake and alert, spontaneously communicative and at mental status baseline. Continues to have severe functional debility likely 2/2 prolonged critical illness polyneuropathy but is progressing well. Now able to transfer via [place] to chair daily and is independent of UE and fine motor function. Off all sedation. Weaning pain control, pt remains dependent on Dilaudid prn for LE neuropathic pain. PO regimen added and gabapentin increased without benefit, now on 800mg TID. Dilaudid increased in frequency/dose given persistent uncontrolled pain with improvement. Will consider transition to fentanyl patch. Pain mgmt team consulted, recs appreciated. Podiatry consulted, possible worsening of right 5th digit that may require podiatric sx. Will CTM for now. No evidence of active infectious process in feet.    Pt with acute combined hypoxemic and hypercapnic respiratory failure with failure to wean from ventilator s/p tracheostomy placement. Pt was tolerating TC QD, PSV (18/10) qhs. Airway clearance therapy in place. Wean O2 supplementation for goal O2 saturation 90-95%. Aspiration precautions and oral hygiene in place. Decannulated at bedside 3/28, continues to use Bilevel NIV qhs. Serial ABGs wnl.     Pt initially p/w RV failure 2/2 pulmHTN now s/p aggressive diuresis with improvement. Now transitioned to PO diuretics with goal to maintain euvolemia. Acetazolamide added today. AFib well controlled conservatively, will continue to monitor on telemetry. TTE 2/25 with new RVE, 3/11 with RVSD. DVT noted on repeat duplex of the right IJ and bilateral peroneal veins. Will c/w full A/C on Eliquis for DVT and AFib.     Pt with oropharyngeal dysphagia s/p PEG placement (3/7) now transitioned to PO diet (3/20) via FEEST. Will continue to monitor on regular PO diet. Bowel regimen in place prn. PPI ppx. Transaminitis improving. Pt with newly diagnosed hepatomegaly.     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 TTE (-) endocarditis. Wound care team following. ID recs appreciated. Further found to have worsening sacral ulceration with induration, CT A/P and MRI spine performed without evidence of underlying tract or drainable fluid collection. Now with acute onset fevers, although without new s/s or complaints and with unclear source. Cx resent, empiric abx initiated with improvement but cx NTD. Fever curve downtrending. Wound care team evaluated and is concerned for worsening tunnelling of sacral wound but CT imaging (-) concern for osteomyelitis or occult infectious sources. Discussed with ID, given persistent sepsis of unclear primary etiology will obtain wagged WBC scan and MRI sacrum.       Dispo pending medical optimization. Pt full code. DVT ppx in place. Palliative consulted, recs appreciated. Will continue to update family and discuss plan of care throughout hospitalization. Dispo planning to acute rehab initiated. Pt is a 39M with MHx obesity (Class III, BMI 69), pAfib (no AC), HTN, BEA (dz'ed 2019, AHI 34 non-compliant on CPAP set at 10), and history of b/l papilledema attributed to pseudotumor cerebri initially presenting as a transfer [facility] on 2/26 for acute hypoxemic respiratory failure s/p ETT intubation/MV with progression to refractory ARDS s/p initiation of vv-ECMO support (2/26-3/7) with failure to wean from ventilator s/p tracheostomy placement, further found to have RV failure s/p aggressive diuresis and PNA followed by severe sepsis 2/2 panniculitis c/b candida albicans fungemia now transferred to [facility] for further medical management.     Pt now awake and alert, spontaneously communicative and at mental status baseline. Continues to have severe functional debility likely 2/2 prolonged critical illness polyneuropathy but is progressing well. Now able to transfer via [place] to chair daily and is independent of UE and fine motor function. Off all sedation. Weaning pain control, pt remains dependent on Dilaudid prn for LE neuropathic pain. PO regimen added and gabapentin increased without benefit, now on 800mg TID. Dilaudid increased in frequency/dose given persistent uncontrolled pain with improvement. Will consider transition to fentanyl patch. Pain mgmt team consulted, recs appreciated. Podiatry consulted, possible worsening of right 5th digit that may require podiatric sx. Will CTM for now. No evidence of active infectious process in feet.    Pt with acute combined hypoxemic and hypercapnic respiratory failure with failure to wean from ventilator s/p tracheostomy placement. Pt was tolerating TC QD, PSV (18/10) qhs. Airway clearance therapy in place. Wean O2 supplementation for goal O2 saturation 90-95%. Aspiration precautions and oral hygiene in place. Decannulated at bedside 3/28, continues to use Bilevel NIV qhs. Serial ABGs wnl.     Pt initially p/w RV failure 2/2 pulmHTN now s/p aggressive diuresis with improvement. Now transitioned to PO diuretics with goal to maintain euvolemia. Acetazolamide added today. AFib well controlled conservatively, will continue to monitor on telemetry. TTE 2/25 with new RVE, 3/11 with RVSD. DVT noted on repeat duplex of the right IJ and bilateral peroneal veins. Will c/w full A/C on Eliquis for DVT and AFib.     Pt with oropharyngeal dysphagia s/p PEG placement (3/7) now transitioned to PO diet (3/20) via FEEST. Will continue to monitor on regular PO diet. Bowel regimen in place prn. PPI ppx. Transaminitis improving. Pt with newly diagnosed hepatomegaly.     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 TTE (-) endocarditis. Wound care team following. ID recs appreciated. Further found to have worsening sacral ulceration with induration, CT A/P and MRI spine performed without evidence of underlying tract or drainable fluid collection. Now with acute onset fevers, although without new s/s or complaints and with unclear source. Cx resent, empiric abx initiated with improvement but cx NTD. Fever curve downtrending. Wound care team evaluated and is concerned for worsening tunnelling of sacral wound but CT imaging (-) concern for osteomyelitis or occult infectious sources. Discussed with ID, given persistent sepsis of unclear primary etiology will obtain wagged WBC scan and MRI sacrum.     Dispo pending medical optimization. Pt full code. DVT ppx in place. Palliative consulted, recs appreciated. Will continue to update family and discuss plan of care throughout hospitalization. Dispo planning to acute rehab initiated.

## 2025-04-08 NOTE — PROGRESS NOTE ADULT - ASSESSMENT
38 YO M with PMHx of morbid obesity, BEA on CPAP, pAFIB (not on AC), HTN, and BL papilledema attributed to pseudotumor cerebri who presented initially to [facility] on 2/24 with SOB and BL LE edema. Found with URI outpatient with progressive SOB, and concern for noted for MFPNA on imaging. Course progressed with AHRF with worsening O2 demand, respiratory distress, secretions, and somnolence requiring intubation (difficult with success after 6 attempts) in ED and admission to [facility] MICU. While in MICU, patient noted with increasing O2 demand with no improvement despite optimal vent management. Concern noted for progressive ARDS, unable to prone given morbid obesity, and ultimately cannulated for vvECMO on 2/25 and transferred to [facility] for further management on 2/26. While at [facility], tx with diuresis and ABX, and ultimately decannulated and s/p 60XLTCP trach and PEG on 3/7. Patient ultimately transferred to RCU on 3/11 and course complicated by recurrent high grade fevers and found with fungemia.    NEUROLOGY  # Hx of Pseudotumor Cerebri   - s/p LP in 2024 with high opening pressure  - s/p MRI 2024 with possible pituitary mass but unclear because of pressure effect from pseudotumor cerebri causing partially empty sella.  - Prolactin elevated   - ACTH low but received steroids during admission   - FT4 low normal and TSH normal, but given FT4 low normal TSH should be higher   - Discuss endocrine consult for inhouse work up vs outpatient.     # Sedation   - Weaned off sedation in ICU   - Nimbex turned off 2/27   - Prop turned off 3/9   - Precedex turned off and catapres started 3/11  - catapres 0.2 TID (decreased 3/25) - tapered down 3/31 to 0.1 tid - discontinued 4/3     # Insomnia   - Patient worked night shift x 15 years   - Trouble sleeping at night leading to day time sleepiness and poor participation with PT  - Continue on Seroquel 25 QHS (increased 3/18)   - Continue on melatonin   - Continue on Neurontin as below    # BL LE neuropathy   - CIPN concern   - Neurontin increased to 300mg Q8H with relief   - added Percocet Q4H for moderate pain (3/25) and continue on Dilaudid Q6H for severe pain   - Pain mgt called to consult - with recs for tylenol ATC, NEurontin increased to 400mg q6, Oxycodone 5 q4h prn , Dilaudid for BREAKTHROUGH pain only, Lidocaine patches on each leg, ice prn to feet  - Some improvement in pain - PT consult for TENS,   - oxycodone inc to 10mg as pt with no relief /buttocks area indurated /tender CT ordered -no abcess  - C/w Gabapentin : 600/600/800 and uptitrate as required  - Switched to PO Dilaudid 2mg Q6H PRN moderate-severe and IV Dilaudid PRN severe breakthrough and with dressing changes   - Still with severe pain 10/10 despite change to dilaudid will increase to 4mg q4h prn with iv dilaudid breakthrough  - Neurontin increased to 800mg q8h Monitor neuro status/lethergy   - I have reviewed with pt to not wait to long for dose of pain medication to  prevent pain from escalating   -Meds reviewed despite being in constant pain pt not taking dilaudid as often as he can often with 10/10 pain DIlaudid will be every 4 hours with parameters and pt can refuse Will reconsult pain mgt again for any recommendations on monday     CARDIOVASCULAR  # HTN   - HTN noted in ICU requiring cardene and esmolol GTTs   - Lisinopril dc'ed to increase catapres   - Continue on norvasc 10 and catapres 0.3 TID   - Monitor BP     # AFIB   - Hx of pAFIB   - No RVR episodes or pAFIB episodes in ICU   - No rate control medications needed  - Monitor on telemetry     # RV dilation second to PHTN   - POCUS on arrival to [facility] with RVE concern   - TTE 10/2024 with EF 55-60 with normal LVSF, moderate LVH and normal RVSF and size with no concern for pHTN   - TTE on 2/25 at  with EF 61 and normal LVSF, but enlarged RV size with reduced RVSF, mild TR, mild PHTN with PASP 49, and dilated IVC to 3.4cn, but normal TAPSE 2.1.  - Continue on aggressive diuresis in ICU    - TTE 3/11 with RVSF reduced with TAPSE 1.6. IVC improved to 2.12cm.   - Continue on lasix 40 PO BID   - Monitor IO/BMP    RESPIRATORY  # ARDS second to MFPNA   - Hx of BEA on CPAP presented to  post URI with SOB and found with AHRF with progression to ARDS second to post viral MFPNA   - Intubated 2/25   - Cannulated for vvECMO 2/26   - s/p 60XLTCP tracheostomy 3/7   - Decannulated 3/7   - CTSx removed tracheostomy and ECMO sutures on 3/17   - Continue on TC QD with PMV as able with strong phonation  - Continue on PS 18/10/40 QHS given OHS   - Continue on nebs and HTS   -  trials continue and now trialing overnight with BIPAP for OHS (10/8/12)  - Decannulated 3/28   - Using nasal cannula 4L     GI  # Dysphagia   - s/p PEG 3/7   - Attempted green dye on 3/13 and failed  - Continue on TF  - Continue on miralax and senna  - RPT green dye passed 3/19  - 3/20 Passed FEEST - on soft bite sized with thin liquids Added consistent carb 2/2 weight/Elevated A1c     # Mild transaminitis   - LFTs elevated in ICU with TBILI elevated likely from ECMO hemolysis   - US ABD with hepatic steatosis   - Trend LFTs      RENAL  # ELLA   - ELLA likely from cardiorenal with RVE   - Diuresed in ICU and improved   - Monitor renal function and UOP   - Recurrent ELLA noted suspected in s/o vancomycin toxicity (4/7) now pending urine studies/UA and will attempt IV hydration     # Hypernatremia   - Hypernatremia with fever spikes   - Fever curve improved and attempting to wean FWF   - Continue on  Q6H (decreased 3/19)    INFECTIOUS DISEASE  # Recurrent fevers with fungemia from unknown source   - Recurrent fevers post ECMO decannulation thought initially to be cytokine surge   - BCx, SCx, UCx, RVP and MRSA RPT on 3/12 negative   - Completed zosyn (3/12-3/18) empirically with improved WBC , however recurrent fevers noted.   - RPT SCx, UA, CXR and RVP negative  - TTE 3/17 with no IE  - PanCT 3/17 with PNA and persistent panniculitis   - BCx 3/15 and 3/16 with candida albicans.   - Concern for possible source from panniculitis   - Continue on caspo (3/17 - ) and RPT BCx 3/18 negative  - ID following  - plan for 2 week course after negative BCx to 4/2  - 3/20 Bcx- NGTD  - 3/30 spiked temp cx's sent + ua and c/s pending Bcx neg to date - started on vanco /zosyn ID following  - Vanco stopped (4/7) and will c/w empiric Zosyn/Caspofungin for now   - 4/3 - persistent fevers, leukocytosis. Will repeat CT C/A/P to evaluate for source - no source found- Bilat lung opacities mildly decreased         # MFPNA and abdominal pannus cellulitis   - Presented to  post URI with SOB and found with AHRF with progression to ARDS second to post viral MFPNA   - RVP, legionella, strept, BAL, BCx, UCx, HIV, PCP, and adenovirus PCR negative   - Initially started on multiple agents in ICU and ultimatelty completed zosyn (2/26-3/9) ZMAX (2/25-2/26) and vanco (2/26-3/1)     # R/o Sacral OM  - Noted with hard indurated sacral wound by Wound Care Team  - CT Pelvis (3/29) revealing superficial skin thickening overlying the sacrum with underlying edema of the subcutaneous fat, in keeping with the clinical history of sacral wound. No evidence of underlying tract or drainable fluid collection.  - CT 4/3 - no fluid collection in sacral tissue   - Given ongoing fever with no clear source with obtain MRI Pelvis per ID recs to r/o sacral OM once renal fx improves    # Penile discharge   - GC/ chlamydia negative   - HIV negative   - Monitor for now    # PPX   - MRSA PCR positive and completed bactroban (2/26-3/3)     HEME   # Anemia likely second to ECMO circuit vs AOCD   - HH low in ICU second to ECMO circuit   - HH dropping again today however no bleeding noted   - Microcytic and hemochromic, sent anemia panel 3/19  - Trend HH     VASCULAR   # R IJ and BL LE DVTs   - Post ECMO dopplers on 3/9 negative for BL UE/ LE DVTs.   - Subsequent post ECMO dopplers performed on 3/14 and noted with acute, non-occlusive deep vein thrombosis noted within the right internal jugular vein. Acute, occlusive deep vein thromboses noted within the right and left peroneal veins at both mid calves, and age-indeterminate, occlusive deep vein thrombosis visualized within the left gastrocnemius vein at the proximal calf.     - Continue on argatroban GTT and change to eliquis     PODIATRY   # BL plantar feet blisters likely pressors induced  - Seen by podiatry and blisters lanced on 3/4 and again on 3/18  - Continue on local wound care per podiatry   - Podiatry following  - OK for WBAT will fu with PT for offloading shoe if appropriate  - Pain mgt consult   - Podiatry FU 3/28 / 4/3 done /following     ENDOCRINE  # Pre-DM2   - A1C 6.7  - Continue on ISS   - Monitor FS   - IF no demand then stop ISS     LINES/ TUBES  - R IJ and R FEM ECMO (2/26-3/7)     SKIN  # Pannus canndial intertrigo   # Sacrum/ buttock MAD  - Continue on clotrimazole cream   - WOC appreciated   -Seen by WC pt noted to have oozing from Buttock wound surgicel placed by wound care On follow up no further bleeding  - Wound care placed orders   - No bleeding from wound     ETHICS/ GOC    - FULL CODE     DISPO - PT following  Seen by PMR for acute rehab per recs

## 2025-04-08 NOTE — PROGRESS NOTE ADULT - SUBJECTIVE AND OBJECTIVE BOX
Patient is a 39y old  Male who presents with a chief complaint of sob (02 Mar 2025 06:19)      Interval history: WBC scan today given sepsis without clear source.     REVIEW OF SYSTEMS  weakness  lower extremity pain    PAST MEDICAL & SURGICAL HISTORY  HTN (hypertension)    Atrial fibrillation    Papilledema of both eyes    Chronic sinusitis    Morbid obesity    No significant past surgical history      CURRENT FUNCTIONAL STATUS     Bed Mobility  Bed Mobility Training Rehab Potential: good, to achieve stated therapy goals  Bed Mobility Training Sit-to-Supine: moderate assist (50% patient effort);  nonverbal cues (demo/gestures);  1 person assist;  verbal cues;  set-up required;  hand over hand;  bed rails  Bed Mobility Training Supine-to-Sit: moderate assist (50% patient effort);  1 person assist;  verbal cues;  nonverbal cues (demo/gestures);  hand over hand;  set-up required;  bed rails  Bed Mobility Training Limitations: impaired ability to control trunk for mobility;  decreased ability to use legs for bridging/pushing;  impaired balance;  decreased strength;  impaired postural control    Sit-Stand Transfer Training  Sit-to-Stand Transfer Training Treatment not Performed: unable to at this time due to LE weakness, patient knee extension strength 3-/5 but patient is motivated and progressing     Therapeutic Exercise  Therapeutic Exercise Rehab Effort: good  Therapeutic Exercise Detail: Ther ex of LE included ankle pumps, knee extension, hip flexion. Pt instructed to perform as able throughout day. Pt verbalized understanding of therapeutic exercises. Patient tolerated sitting up on edge of bed for approx 30 minutes independently.           RECENT LABS/IMAGING  CBC Full  -  ( 2025 06:10 )  WBC Count : 13.91 K/uL  RBC Count : 2.83 M/uL  Hemoglobin : 8.0 g/dL  Hematocrit : 24.9 %  Platelet Count - Automated : 422 K/uL  Mean Cell Volume : 88.0 fL  Mean Cell Hemoglobin : 28.3 pg  Mean Cell Hemoglobin Concentration : 32.1 g/dL  Auto Neutrophil # : x  Auto Lymphocyte # : x  Auto Monocyte # : x  Auto Eosinophil # : x  Auto Basophil # : x  Auto Neutrophil % : x  Auto Lymphocyte % : x  Auto Monocyte % : x  Auto Eosinophil % : x  Auto Basophil % : x        136  |  93[L]  |  12  ----------------------------<  92  3.7   |  32[H]  |  1.84[H]    Ca    9.2      2025 06:10  Phos  6.1     -  Mg     1.80     -      Urinalysis Basic - ( 2025 07:30 )    Color: Yellow / Appearance: Clear / S.003 / pH: x  Gluc: x / Ketone: Negative mg/dL  / Bili: Negative / Urobili: 1.0 mg/dL   Blood: x / Protein: Negative mg/dL / Nitrite: Negative   Leuk Esterase: Negative / RBC: 1 /HPF / WBC 0 /HPF   Sq Epi: x / Non Sq Epi: 0 /HPF / Bacteria: Negative /HPF        VITALS  T(C): 36.9 (25 @ 10:30), Max: 37 (25 @ 06:02)  HR: 111 (25 @ 10:30) (110 - 118)  BP: 126/66 (25 @ 10:30) (126/66 - 153/99)  RR: 19 (25 @ 10:30) (18 - 20)  SpO2: 97% (25 @ 10:30) (91% - 100%)  Wt(kg): --    ALLERGIES  No Known Allergies      MEDICATIONS   acetaminophen     Tablet .. 650 milliGRAM(s) Oral every 6 hours PRN  albuterol/ipratropium for Nebulization 3 milliLiter(s) Nebulizer every 6 hours PRN  amLODIPine   Tablet 10 milliGRAM(s) Oral daily  apixaban 5 milliGRAM(s) Oral every 12 hours  caspofungin IVPB 70 milliGRAM(s) IV Intermittent every 24 hours  chlorhexidine 2% Cloths 1 Application(s) Topical <User Schedule>  clotrimazole 1% Cream 1 Application(s) Topical two times a day  Dakins Solution - 1/4 Strength 1 Application(s) Topical two times a day  dextrose 50% Injectable 25 Gram(s) IV Push once  dextrose 50% Injectable 12.5 Gram(s) IV Push once  dextrose 50% Injectable 25 Gram(s) IV Push once  dextrose Oral Gel 15 Gram(s) Oral once PRN  gabapentin 800 milliGRAM(s) Oral every 8 hours  glucagon  Injectable 1 milliGRAM(s) IntraMuscular once  HYDROmorphone   Tablet 4 milliGRAM(s) Oral every 4 hours  HYDROmorphone  Injectable 0.5 milliGRAM(s) IV Push every 6 hours PRN  insulin lispro (ADMELOG) corrective regimen sliding scale   SubCutaneous three times a day before meals  insulin lispro (ADMELOG) corrective regimen sliding scale   SubCutaneous at bedtime  lactated ringers. 1000 milliLiter(s) IV Continuous <Continuous>  lactated ringers. 1000 milliLiter(s) IV Continuous <Continuous>  lidocaine   4% Patch 1 Patch Transdermal daily  lidocaine   4% Patch 1 Patch Transdermal daily  melatonin 3 milliGRAM(s) Oral at bedtime  multivitamin 1 Tablet(s) Oral daily  mupirocin 2% Ointment 1 Application(s) Topical two times a day  pantoprazole    Tablet 40 milliGRAM(s) Oral before breakfast  piperacillin/tazobactam IVPB.. 3.375 Gram(s) IV Intermittent every 8 hours  polyethylene glycol 3350 17 Gram(s) Oral daily  QUEtiapine 25 milliGRAM(s) Oral at bedtime  senna 2 Tablet(s) Oral at bedtime      ----------------------------------------------------------------------------------------  PHYSICAL EXAM  Constitutional - NAD    Chest - no respiratory distress  Cardiovascular -mild tachy  Abdomen - +PEG  Extremities - dressings b/l feet  Neurologic Exam -                    Cognitive - Awake, Alert, AAO to self, place, date, year, situation      Communication - fluent     Cranial Nerves - CN 2-12 intact     Motor -                     LEFT    UE - 4+/5                    RIGHT UE - 4+/5                    LEFT    LE -  2+/5                      RIGHT LE -  2-/4     Sensory - Intact to LT       Balance - WNL Static  Psychiatric - Mood stable, Affect WNL  ----------------------------------------------------------------------------------------  ASSESSMENT/PLAN  40 yo m PMHx of morbid obesity, BEA on CPAP, pAFIB (not on AC), HTN, and BL papilledema attributed to pseudotumor cerebri who presented initially to [facility] on  with SOB and BL LE edema. Found with URI outpatient with progressive SOB, and concern for noted for MFPNA on imaging. Course progressed with AHRF with worsening O2 demand, respiratory distress, secretions, and somnolence requiring intubation (difficult with success after 6 attempts) in ED and admission to [facility] MICU. While in MICU, patient noted with increasing O2 demand with no improvement despite optimal vent management. Concern noted for progressive ARDS, unable to prone given morbid obesity, and ultimately cannulated for vvECMO on  and transferred to [facility] for further management on .   transferred to [facility] 3/11, course complicated by fever.    s/p trach and peg 3/7   continue bedside PT and OT     Pain - acetaminophen, dilaudid prn, gabapentin 800mg q8  DVT PPX - on eliquis  Rehab -  recommend acute rehab when medically cleared.  Patient can tolerate 3 hours per day of therapy with medical supervision.  Would need to be afebrile and off iv pain medications prior to discharge.       Total time spent to review relevant records and imaging results, examine patient, complete documentation, and when applicable discuss the case with the patient, family, , social workers, and medical team: 35  minutes

## 2025-04-08 NOTE — PROGRESS NOTE ADULT - SUBJECTIVE AND OBJECTIVE BOX
CHIEF COMPLAINT:Patient is a 39y old  Male who presents with a chief complaint of sob (02 Mar 2025 06:19)      INTERVAL EVENTS: worsening ELLA.. C/w Zosyn and Caspofungin per ID recs. Awaiting tagged WBC scan and MRI Pelvis (pending improvement in renal fx). Complains of persistent LE pain.     ROS: Seen by bedside during AM rounds     OBJECTIVE:  ICU Vital Signs Last 24 Hrs  T(C): 37 (08 Apr 2025 06:02), Max: 37 (08 Apr 2025 06:02)  T(F): 98.6 (08 Apr 2025 06:02), Max: 98.6 (08 Apr 2025 06:02)  HR: 110 (08 Apr 2025 06:02) (110 - 118)  BP: 143/98 (08 Apr 2025 06:02) (129/81 - 153/99)  RR: 20 (08 Apr 2025 06:02) (18 - 20)  SpO2: 98% (08 Apr 2025 07:24) (91% - 100%)    O2 Parameters below as of 08 Apr 2025 06:02  Patient On (Oxygen Delivery Method): nasal cannula  O2 Flow (L/min): 2                04-06 @ 07:01  -  04-07 @ 07:00  --------------------------------------------------------  IN: 1000 mL / OUT: 6300 mL / NET: -5300 mL      CAPILLARY BLOOD GLUCOSE    POCT Blood Glucose.: 93 mg/dL (08 Apr 2025 07:30)  POCT Blood Glucose.: 108 mg/dL (07 Apr 2025 22:16)  POCT Blood Glucose.: 97 mg/dL (07 Apr 2025 17:10)  POCT Blood Glucose.: 106 mg/dL (07 Apr 2025 11:50)      PHYSICAL EXAM:  General: NAD   Neck: trachea midline.  Cards: S1/S2, no murmurs   Pulm: CTA bilaterally. No wheezes.   Abdomen: Abdomen soft. Morbidly obese abdomen. Nontender. (+) PEG noted, site c/d/i.   Extremities: 1-2+ b/l LE pitting edema. Extremities warm to touch.  Neurology: AOx3. 5/5 motor strength b/l UE. Significant weakness with inability to range b/l LE.  Follows commands. Conversational.   Skin: warm to touch, color appropriate for ethnicity. Refer to RN assessment for further details.        HOSPITAL MEDICATIONS:  MEDICATIONS  (STANDING):  amLODIPine   Tablet 10 milliGRAM(s) Oral daily  apixaban 5 milliGRAM(s) Oral every 12 hours  caspofungin IVPB 70 milliGRAM(s) IV Intermittent every 24 hours  chlorhexidine 2% Cloths 1 Application(s) Topical <User Schedule>  clotrimazole 1% Cream 1 Application(s) Topical two times a day  Dakins Solution - 1/4 Strength 1 Application(s) Topical two times a day  dextrose 50% Injectable 25 Gram(s) IV Push once  dextrose 50% Injectable 12.5 Gram(s) IV Push once  dextrose 50% Injectable 25 Gram(s) IV Push once  furosemide    Tablet 40 milliGRAM(s) Oral two times a day  gabapentin 800 milliGRAM(s) Oral every 8 hours  glucagon  Injectable 1 milliGRAM(s) IntraMuscular once  HYDROmorphone   Tablet 4 milliGRAM(s) Oral every 4 hours  insulin lispro (ADMELOG) corrective regimen sliding scale   SubCutaneous three times a day before meals  insulin lispro (ADMELOG) corrective regimen sliding scale   SubCutaneous at bedtime  lidocaine   4% Patch 1 Patch Transdermal daily  lidocaine   4% Patch 1 Patch Transdermal daily  melatonin 3 milliGRAM(s) Oral at bedtime  multivitamin 1 Tablet(s) Oral daily  mupirocin 2% Ointment 1 Application(s) Topical two times a day  pantoprazole    Tablet 40 milliGRAM(s) Oral before breakfast  piperacillin/tazobactam IVPB.. 3.375 Gram(s) IV Intermittent every 8 hours  polyethylene glycol 3350 17 Gram(s) Oral daily  QUEtiapine 25 milliGRAM(s) Oral at bedtime  senna 2 Tablet(s) Oral at bedtime  vancomycin  IVPB 1000 milliGRAM(s) IV Intermittent every 8 hours    MEDICATIONS  (PRN):  acetaminophen     Tablet .. 650 milliGRAM(s) Oral every 6 hours PRN Temp greater or equal to 38C (100.4F), Mild Pain (1 - 3), Moderate Pain (4 - 6)  albuterol/ipratropium for Nebulization 3 milliLiter(s) Nebulizer every 6 hours PRN Shortness of Breath and/or Wheezing  dextrose Oral Gel 15 Gram(s) Oral once PRN Blood Glucose LESS THAN 70 milliGRAM(s)/deciliter  HYDROmorphone  Injectable 0.5 milliGRAM(s) IV Push every 6 hours PRN dressing changes and severe breakthrough      LABS:                                   8.0    13.91 )-----------( 422      ( 08 Apr 2025 06:10 )             24.9   04-08    136  |  93[L]  |  12  ----------------------------<  92  3.7   |  32[H]  |  1.84[H]    Ca    9.2      08 Apr 2025 06:10  Phos  6.1     04-08  Mg     1.80     04-08        Urinalysis Basic - ( 06 Apr 2025 06:25 )    Color: x / Appearance: x / SG: x / pH: x  Gluc: 90 mg/dL / Ketone: x  / Bili: x / Urobili: x   Blood: x / Protein: x / Nitrite: x   Leuk Esterase: x / RBC: x / WBC x   Sq Epi: x / Non Sq Epi: x / Bacteria: x

## 2025-04-08 NOTE — PROGRESS NOTE ADULT - ASSESSMENT
This is a 40 y/o M w/ PMHx AF (not on AC), HTN, BEA, pseudotumor cerebri s/p LP in 2024, initially presented to [place] on 2/24, SOB, LE edema with URI symptoms and sick contacts, noted to have severe ARDS, intubated however still hypoxia, cannulated for VV ECMO on 2/25, transferred to [facility] on 2/25, started on empiric Vanco, Zosyn for multifocal PNA, abdominal wall cellulitis, s/p trach placement by CT surgery on 3/7, ECMO decannulated on 3/7. Zosyn held on 3/9.  C/b fevers on 3/10, restarted on Zosyn, transferred to [facility] on 3/13, Bcx now w/ yeast.    #Candida albicans fungemia   #Fevers   #Multifocal PNA, abdominal wall cellulitis s/p Vanco/Zosyn  #ARDS, hypoxic respiratory failure requiring VV EMCO 2/2 multifocal PNA? s/p decannulation on 3/7    Overall, 40 y/o M w/ PMHx AF (not on AC), HTN, BEA, pseudotumor cerebri s/p LP in 2024 admitted to [facility] on 2/26 for ARDS, hypoxic respiratory failure requiring VV EMCO 2/2 multifocal PNA? s/p decannulation on 3/7, c/b abdominal wall cellulitis s/p Vancomycin/Zosyn, s/p trach on 3/7, in the setting of persistent fevers despite Zosyn, BCx now w/ yeast, Candida albicans   Unclear source for yeast in BCx, pt had all central lines removed on 3/7, not getting TPN, no abdominal pain on exam, PEG site well appearing.     CT A/P w/o clear abdominal source of fungemia, possibly 2/2 abdominal wall cellulitis? TTE w/o IE.  BCx 3/18 1/2 positive, 3/20 NGTD x 2.     Pt with worsening sepsis on 3/30, febrile to 103, WBC 16.   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   Pt with deep tunneled wound to left, no drainage, malodor per wound care.   CT A/P w/ b/l pulmonary opacities but improved, no sacral abscess     Recommendations;   1. Continue with Zosyn 3.375 g 8 for now  2. Caspofungin 70 mg daily for now  3. Would obtain WBC scan given sepsis w/o clear source  4. Obtain MRI pelvis to eval for OM, would treat for 6 weeks if positive.     Thank you for consulting us and involving us in the management of this patient's case. In addition to reviewing history, imaging, documents, labs, microbiology, and infection control strategies and potential issues.     ID will continue to follow    Shailesh Owens M.D.  Attending Physician  Division of Infectious Diseases  Department of Medicine    Please contact through [employer].  Office: [phone] (after 5 PM or weekend)

## 2025-04-08 NOTE — PROGRESS NOTE ADULT - SUBJECTIVE AND OBJECTIVE BOX
Chief Complaint:    HPI:  38 yo M w/ class III obesity, pAfib (no AC), HTN, BEA (on CPAP), history of b/l papilledema attributed to pseudotumor cerebri s/p LP in  with very high opening pressure, who is transferred for VV ECMO for ARDS and severe hypoxia. Patient initially presented to [facility] on  for SOB and b/l LE edema. 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 He is a current every day smoker of Newports and marijuana, but does not drink or do other drugs. Currently lives with his mother. There is a long term partner in his life, but they are not , and they have an on/off relationship currently not very involved with each other as per the patient's mother and aunt.  At [facility] where he was getting an eval last year for shortness of breath, he had a sleep study and was diagnosed with sleep apnea, prescribed a PAP machine, which he has in his room but the mother is not sure how many nights per week he uses it. Recently, the last day or two, his mother and brother have been under the weather with URIs and the patient 2 days ago started to develop a ruynny nose, ough, some wheezing of the chest, as well as worsening shortness of breath and fevers at home. He called his aunt who told him to go get checked out and then he landed at the [facility] ED. Patient found to be reportedly hypoxemic to 70% on RA with worsening respiratory status/secretions and somnolence in the ED. Patient was intubated with difficulty (7 attempts) due to very large tongue secretions. Despite optimal vent management, patient remained hypoxemic. Unable to prone due to obesity. Patient cannulated for VV ECMO on  and transferred to [facility] for further management.     [facility] labs notable for MRSA PCR -, Fluvid -, urine legionella -, sputum gram stain  with GPC and GPR    CTA chest on  negative for pulmonary embolism, notable for patchy bilateral lower lobe opacities, hepatomegaly, mild ascites, edema/induration of the abdominal pannus.    LE duplex on  negative for DVT    TTE on  showed LV EF 61%, enlarged RV with TAPSE 2.1cm, ePASP at least 49 (Patient had 10/2024 TTE with normal LV and RV with ePASP 14).    Only home med is Lasix. Not on acetazolamide for pseudotumor or on Wegovy (was pending auth) (2025 01:48)      PAST MEDICAL & SURGICAL HISTORY:  HTN (hypertension)  Atrial fibrillation paroxysmal  Papilledema of both eyes  Chronic sinusitis  Morbid obesity    No significant past surgical history    FAMILY HISTORY:      SOCIAL HISTORY:  [ ] Denies Smoking, Alcohol, or Drug Use    Allergies  No Known Allergies    PAIN MEDICATIONS:  acetaminophen     Tablet .. 650 milliGRAM(s) Oral every 6 hours PRN  gabapentin 800 milliGRAM(s) Oral every 8 hours  HYDROmorphone   Tablet 4 milliGRAM(s) Oral every 4 hours  HYDROmorphone  Injectable 0.5 milliGRAM(s) IV Push every 6 hours PRN  melatonin 3 milliGRAM(s) Oral at bedtime  QUEtiapine 25 milliGRAM(s) Oral at bedtime    Heme:  apixaban 5 milliGRAM(s) Oral every 12 hours    Antibiotics:  caspofungin IVPB 70 milliGRAM(s) IV Intermittent every 24 hours  piperacillin/tazobactam IVPB.. 3.375 Gram(s) IV Intermittent every 8 hours    Cardiovascular:  amLODIPine   Tablet 10 milliGRAM(s) Oral daily    GI:  pantoprazole    Tablet 40 milliGRAM(s) Oral before breakfast  polyethylene glycol 3350 17 Gram(s) Oral daily  senna 2 Tablet(s) Oral at bedtime    Endocrine:  dextrose 50% Injectable 25 Gram(s) IV Push once  dextrose 50% Injectable 12.5 Gram(s) IV Push once  dextrose 50% Injectable 25 Gram(s) IV Push once  dextrose Oral Gel 15 Gram(s) Oral once PRN  glucagon  Injectable 1 milliGRAM(s) IntraMuscular once  insulin lispro (ADMELOG) corrective regimen sliding scale   SubCutaneous three times a day before meals  insulin lispro (ADMELOG) corrective regimen sliding scale   SubCutaneous at bedtime    All Other Medications:  chlorhexidine 2% Cloths 1 Application(s) Topical <User Schedule>  clotrimazole 1% Cream 1 Application(s) Topical two times a day  Dakins Solution - 1/4 Strength 1 Application(s) Topical two times a day  lactated ringers. 1000 milliLiter(s) IV Continuous <Continuous>  lactated ringers. 1000 milliLiter(s) IV Continuous <Continuous>  lidocaine   4% Patch 1 Patch Transdermal daily  lidocaine   4% Patch 1 Patch Transdermal daily  multivitamin 1 Tablet(s) Oral daily  mupirocin 2% Ointment 1 Application(s) Topical two times a day      Vital Signs Last 24 Hrs  T(C): 36.9 (2025 10:30), Max: 37 (2025 06:02)  T(F): 98.5 (2025 10:30), Max: 98.6 (2025 06:02)  HR: 111 (2025 10:30) (110 - 118)  BP: 126/66 (2025 10:30) (126/66 - 153/99)  BP(mean): --  RR: 19 (2025 10:30) (18 - 20)  SpO2: 97% (2025 10:30) (91% - 100%)    Parameters below as of 2025 10:30  Patient On (Oxygen Delivery Method): nasal cannula  O2 Flow (L/min): 2      PAIN SCORE:         SCALE USED: (1-10 VNRS)           LABS:                          8.0    13.91 )-----------( 422      ( 2025 06:10 )             24.9     04-08    136  |  93[L]  |  12  ----------------------------<  92  3.7   |  32[H]  |  1.84[H]    Ca    9.2      2025 06:10  Phos  6.1     04-08  Mg     1.80     04-08        Urinalysis Basic - ( 2025 07:30 )    Color: Yellow / Appearance: Clear / S.003 / pH: x  Gluc: x / Ketone: Negative mg/dL  / Bili: Negative / Urobili: 1.0 mg/dL   Blood: x / Protein: Negative mg/dL / Nitrite: Negative   Leuk Esterase: Negative / RBC: 1 /HPF / WBC 0 /HPF   Sq Epi: x / Non Sq Epi: 0 /HPF / Bacteria: Negative /HPF          Summary:  Received call from primary team. Patient with c/o persistent bilateral LE pain unrelieved by opioids despite atc PO Hydromorphone and IV Hydromorphone. He describes it as feeling like his feet are on fire. There is stinging, burning, shooting, sharp, pins and needles pain in his toes, dorsal and ventral aspects of feet. Patient is unable to move legs without pain and wants some pain relief. Discussed with patient that opioids not effective for neuropathy and increasing doses would not be more effective. Given that he has no outpatient pain management to continue opioids and complicated hospital course with respiratory complications, narcotics is not first-line treatment but will recommend non-opioid adjuvants. Patient agreed and asked for any help to alleviate pain.    PHYSICAL EXAM:  GENERAL: Alert & Oriented x 3 in NAD.     Recommendations:  -  Consider discontinuing current orders for standing PO Dilaudid. No role for opioids at this time.  -  Consider continuing current orders for IV Dilaudid PRN dressing changes. Would continue to wean to dressing changes only.  -  Consider increasing Gabapentin to 900mg Q8H. Hold for sedation.  -  Consider ordering Cymbalta 30mg QD. Hold for sedation.  -  Consider ordering Flexeril 5mg Q8H. Hold for sedation.  -  Recommend maintaining continuous pulse oximetry.  -  Recommend imaging of lower extremities.   -  Recommend follow up with Chronic Pain doctor when discharged. If patient does not have a Chronic Pain doctor, may acquire one through patient's personal insurance carrier.  Discussed patient with Chronic Pain Attending on call, Dr. Burns, who agrees with the above recommendations.  No further recommendations at this time, Chronic pain service to sign off. May call Chronic Pain Service if needed.   Thank you.

## 2025-04-09 LAB
ANION GAP SERPL CALC-SCNC: 11 MMOL/L — SIGNIFICANT CHANGE UP (ref 7–14)
BASOPHILS # BLD AUTO: 0 K/UL — SIGNIFICANT CHANGE UP (ref 0–0.2)
BASOPHILS NFR BLD AUTO: 0 % — SIGNIFICANT CHANGE UP (ref 0–2)
BUN SERPL-MCNC: 13 MG/DL — SIGNIFICANT CHANGE UP (ref 7–23)
CALCIUM SERPL-MCNC: 9.3 MG/DL — SIGNIFICANT CHANGE UP (ref 8.4–10.5)
CHLORIDE SERPL-SCNC: 94 MMOL/L — LOW (ref 98–107)
CO2 SERPL-SCNC: 32 MMOL/L — HIGH (ref 22–31)
CREAT SERPL-MCNC: 1.89 MG/DL — HIGH (ref 0.5–1.3)
EGFR: 46 ML/MIN/1.73M2 — LOW
EGFR: 46 ML/MIN/1.73M2 — LOW
EOSINOPHIL # BLD AUTO: 0.33 K/UL — SIGNIFICANT CHANGE UP (ref 0–0.5)
EOSINOPHIL NFR BLD AUTO: 2.6 % — SIGNIFICANT CHANGE UP (ref 0–6)
GLUCOSE BLDC GLUCOMTR-MCNC: 103 MG/DL — HIGH (ref 70–99)
GLUCOSE BLDC GLUCOMTR-MCNC: 105 MG/DL — HIGH (ref 70–99)
GLUCOSE BLDC GLUCOMTR-MCNC: 89 MG/DL — SIGNIFICANT CHANGE UP (ref 70–99)
GLUCOSE BLDC GLUCOMTR-MCNC: 97 MG/DL — SIGNIFICANT CHANGE UP (ref 70–99)
GLUCOSE SERPL-MCNC: 81 MG/DL — SIGNIFICANT CHANGE UP (ref 70–99)
HCT VFR BLD CALC: 28.2 % — LOW (ref 39–50)
HGB BLD-MCNC: 7.8 G/DL — LOW (ref 13–17)
IANC: 9.02 K/UL — HIGH (ref 1.8–7.4)
LYMPHOCYTES # BLD AUTO: 1 K/UL — SIGNIFICANT CHANGE UP (ref 1–3.3)
LYMPHOCYTES # BLD AUTO: 7.9 % — LOW (ref 13–44)
MAGNESIUM SERPL-MCNC: 1.8 MG/DL — SIGNIFICANT CHANGE UP (ref 1.6–2.6)
MCHC RBC-ENTMCNC: 24.5 PG — LOW (ref 27–34)
MCHC RBC-ENTMCNC: 27.7 G/DL — LOW (ref 32–36)
MCV RBC AUTO: 88.4 FL — SIGNIFICANT CHANGE UP (ref 80–100)
MONOCYTES # BLD AUTO: 1 K/UL — HIGH (ref 0–0.9)
MONOCYTES NFR BLD AUTO: 7.9 % — SIGNIFICANT CHANGE UP (ref 2–14)
NEUTROPHILS # BLD AUTO: 10.18 K/UL — HIGH (ref 1.8–7.4)
NEUTROPHILS NFR BLD AUTO: 80.7 % — HIGH (ref 43–77)
PHOSPHATE SERPL-MCNC: 6.2 MG/DL — HIGH (ref 2.5–4.5)
PLATELET # BLD AUTO: 455 K/UL — HIGH (ref 150–400)
POTASSIUM SERPL-MCNC: 3.9 MMOL/L — SIGNIFICANT CHANGE UP (ref 3.5–5.3)
POTASSIUM SERPL-SCNC: 3.9 MMOL/L — SIGNIFICANT CHANGE UP (ref 3.5–5.3)
RBC # BLD: 3.19 M/UL — LOW (ref 4.2–5.8)
RBC # FLD: 22.5 % — HIGH (ref 10.3–14.5)
SODIUM SERPL-SCNC: 137 MMOL/L — SIGNIFICANT CHANGE UP (ref 135–145)
WBC # BLD: 12.62 K/UL — HIGH (ref 3.8–10.5)
WBC # FLD AUTO: 12.62 K/UL — HIGH (ref 3.8–10.5)

## 2025-04-09 PROCEDURE — 99233 SBSQ HOSP IP/OBS HIGH 50: CPT

## 2025-04-09 PROCEDURE — G0545: CPT

## 2025-04-09 RX ORDER — LORAZEPAM 4 MG/ML
1 VIAL (ML) INJECTION ONCE
Refills: 0 | Status: DISCONTINUED | OUTPATIENT
Start: 2025-04-09 | End: 2025-04-09

## 2025-04-09 RX ORDER — CYCLOBENZAPRINE HYDROCHLORIDE 15 MG/1
5 CAPSULE, EXTENDED RELEASE ORAL THREE TIMES A DAY
Refills: 0 | Status: DISCONTINUED | OUTPATIENT
Start: 2025-04-09 | End: 2025-04-25

## 2025-04-09 RX ORDER — DULOXETINE 20 MG/1
30 CAPSULE, DELAYED RELEASE ORAL DAILY
Refills: 0 | Status: DISCONTINUED | OUTPATIENT
Start: 2025-04-09 | End: 2025-04-16

## 2025-04-09 RX ADMIN — Medication 0.5 MILLIGRAM(S): at 22:39

## 2025-04-09 RX ADMIN — Medication 4 MILLIGRAM(S): at 05:17

## 2025-04-09 RX ADMIN — Medication 0.5 MILLIGRAM(S): at 04:41

## 2025-04-09 RX ADMIN — GABAPENTIN 800 MILLIGRAM(S): 400 CAPSULE ORAL at 22:41

## 2025-04-09 RX ADMIN — LIDOCAINE HYDROCHLORIDE 1 PATCH: 20 JELLY TOPICAL at 11:22

## 2025-04-09 RX ADMIN — GABAPENTIN 800 MILLIGRAM(S): 400 CAPSULE ORAL at 05:17

## 2025-04-09 RX ADMIN — CASPOFUNGIN ACETATE 260 MILLIGRAM(S): 5 INJECTION, POWDER, LYOPHILIZED, FOR SOLUTION INTRAVENOUS at 06:08

## 2025-04-09 RX ADMIN — Medication 25 GRAM(S): at 07:46

## 2025-04-09 RX ADMIN — DULOXETINE 30 MILLIGRAM(S): 20 CAPSULE, DELAYED RELEASE ORAL at 11:21

## 2025-04-09 RX ADMIN — SODIUM HYPOCHLORITE 1 APPLICATION(S): 0.12 SOLUTION TOPICAL at 05:21

## 2025-04-09 RX ADMIN — CLOTRIMAZOLE 1 APPLICATION(S): 1 CREAM TOPICAL at 17:39

## 2025-04-09 RX ADMIN — SODIUM HYPOCHLORITE 1 APPLICATION(S): 0.12 SOLUTION TOPICAL at 17:39

## 2025-04-09 RX ADMIN — MUPIROCIN CALCIUM 1 APPLICATION(S): 20 CREAM TOPICAL at 05:19

## 2025-04-09 RX ADMIN — APIXABAN 5 MILLIGRAM(S): 2.5 TABLET, FILM COATED ORAL at 05:17

## 2025-04-09 RX ADMIN — GABAPENTIN 800 MILLIGRAM(S): 400 CAPSULE ORAL at 14:59

## 2025-04-09 RX ADMIN — Medication 25 GRAM(S): at 14:58

## 2025-04-09 RX ADMIN — AMLODIPINE BESYLATE 10 MILLIGRAM(S): 10 TABLET ORAL at 05:17

## 2025-04-09 RX ADMIN — APIXABAN 5 MILLIGRAM(S): 2.5 TABLET, FILM COATED ORAL at 17:40

## 2025-04-09 RX ADMIN — Medication 2 TABLET(S): at 22:42

## 2025-04-09 RX ADMIN — Medication 0.5 MILLIGRAM(S): at 13:01

## 2025-04-09 RX ADMIN — Medication 4 MILLIGRAM(S): at 01:28

## 2025-04-09 RX ADMIN — Medication 1 APPLICATION(S): at 05:18

## 2025-04-09 RX ADMIN — Medication 4 MILLIGRAM(S): at 05:36

## 2025-04-09 RX ADMIN — QUETIAPINE FUMARATE 25 MILLIGRAM(S): 25 TABLET ORAL at 22:41

## 2025-04-09 RX ADMIN — Medication 4 MILLIGRAM(S): at 02:15

## 2025-04-09 RX ADMIN — CLOTRIMAZOLE 1 APPLICATION(S): 1 CREAM TOPICAL at 05:18

## 2025-04-09 RX ADMIN — LIDOCAINE HYDROCHLORIDE 1 PATCH: 20 JELLY TOPICAL at 07:37

## 2025-04-09 RX ADMIN — Medication 40 MILLIGRAM(S): at 08:32

## 2025-04-09 RX ADMIN — MUPIROCIN CALCIUM 1 APPLICATION(S): 20 CREAM TOPICAL at 17:39

## 2025-04-09 RX ADMIN — Medication 1 TABLET(S): at 11:22

## 2025-04-09 RX ADMIN — Medication 3 MILLIGRAM(S): at 22:42

## 2025-04-09 RX ADMIN — Medication 25 GRAM(S): at 22:43

## 2025-04-09 NOTE — PROGRESS NOTE ADULT - SUBJECTIVE AND OBJECTIVE BOX
[facility]-- WOUND TEAM -- FOLLOW UP NOTE  --------------------------------------------------------------------------------    subjective: Patient send and examined at bedside. Patient reports persistent pain to bilateral feet. Reports he was seen by Podiatry earlier today. Denies pain and/or tenderness to sacral/buttock area. Overall feels good and as though he is getting stronger. Denies feeling feverish, chills, n/v, abdominal pain.     Interval HPI/24 hour events:   -afebrile, leukocytosis mildly improved.  -4/9 seen by Podiatry; see progress notes- no acute podiatry surgical intervention  -4/8 s/p WBC scan with soft tissue infection R side of pelvis, intense activity in R iliac bone possible OM. Lateral R forefoot with soft tissue infection, possible OM?  -ID following; remains on Caspofungin and Zosyn  -Thoracic surgery consulted for possible PEG removal.    Chart reviewed including labs and relevant images      Diet:  Diet, Soft and Bite Sized:   Consistent Carbohydrate Evening Snack (CSTCHOSN)  Supplement Feeding Modality:  Oral  Ensure Max Cans or Servings Per Day:  1       Frequency:  Two Times a day (04-04-25 @ 15:13) [Active]      ROS: General, skin see above.  All other systems negative.    ALLERGIES & MEDICATIONS  --------------------------------------------------------------------------------  Allergies    No Known Allergies    Intolerances          STANDING INPATIENT MEDICATIONS    amLODIPine   Tablet 10 milliGRAM(s) Oral daily  apixaban 5 milliGRAM(s) Oral every 12 hours  caspofungin IVPB 70 milliGRAM(s) IV Intermittent every 24 hours  chlorhexidine 2% Cloths 1 Application(s) Topical <User Schedule>  clotrimazole 1% Cream 1 Application(s) Topical two times a day  Dakins Solution - 1/4 Strength 1 Application(s) Topical two times a day  dextrose 50% Injectable 25 Gram(s) IV Push once  dextrose 50% Injectable 12.5 Gram(s) IV Push once  dextrose 50% Injectable 25 Gram(s) IV Push once  DULoxetine 30 milliGRAM(s) Oral daily  gabapentin 800 milliGRAM(s) Oral every 8 hours  glucagon  Injectable 1 milliGRAM(s) IntraMuscular once  insulin lispro (ADMELOG) corrective regimen sliding scale   SubCutaneous three times a day before meals  insulin lispro (ADMELOG) corrective regimen sliding scale   SubCutaneous at bedtime  lactated ringers. 1000 milliLiter(s) IV Continuous <Continuous>  lidocaine   4% Patch 1 Patch Transdermal daily  lidocaine   4% Patch 1 Patch Transdermal daily  melatonin 3 milliGRAM(s) Oral at bedtime  multivitamin 1 Tablet(s) Oral daily  mupirocin 2% Ointment 1 Application(s) Topical two times a day  pantoprazole    Tablet 40 milliGRAM(s) Oral before breakfast  piperacillin/tazobactam IVPB.. 3.375 Gram(s) IV Intermittent every 8 hours  polyethylene glycol 3350 17 Gram(s) Oral daily  QUEtiapine 25 milliGRAM(s) Oral at bedtime  senna 2 Tablet(s) Oral at bedtime      PRN INPATIENT MEDICATION  acetaminophen     Tablet .. 650 milliGRAM(s) Oral every 6 hours PRN  albuterol/ipratropium for Nebulization 3 milliLiter(s) Nebulizer every 6 hours PRN  cyclobenzaprine 5 milliGRAM(s) Oral three times a day PRN  dextrose Oral Gel 15 Gram(s) Oral once PRN  HYDROmorphone  Injectable 0.5 milliGRAM(s) IV Push every 6 hours PRN        Vital signs:  T(C): 36.9 (04-09-25 @ 08:00), Max: 36.9 (04-09-25 @ 05:17)  HR: 111 (04-09-25 @ 10:46) (98 - 115)  BP: 132/68 (04-09-25 @ 08:00) (129/74 - 152/81)  RR: 18 (04-09-25 @ 08:00) (14 - 18)  SpO2: 98% (04-09-25 @ 10:46) (95% - 100%)  Wt(kg): 197.1 kg (04-)        04-08-25 @ 07:01  -  04-09-25 @ 07:00  --------------------------------------------------------  IN: 0 mL / OUT: 5200 mL / NET: -5200 mL    04-09-25 @ 07:01  -  04-09-25 @ 13:58  --------------------------------------------------------  IN: 0 mL / OUT: 900 mL / NET: -900 mL      Constitutional: NAD, A&OX3. Morbidly Obese. Patient able to assist with turning bilaterally.  (+) bariatric low airloss support surface (+) fluidized positioning devices, bariatric seat cushion on chair, refusing to offload heels due to constant neuropathic pain- reinforced education regarding importance of offloading, will offload after exam.  HEENT: NC/AT, non icteric, mucosa moist. Tracheostomy site with small gauze and tegaderm dressing c/d/i  Cardiovascular: tachycardic   Respiratory: supplemental oxygen via NC, nonlabored, equal chest expansion.  Gastrointestinal: soft NT/ND. (+) PEG, peristomal intact, denuded epidermis resolved.  Increase moisture beneath ABD pannus and bilateral groin, right groin denuded epidermis 9blp6nta6.1cm- pink moist dermis, no drainage, periwound intact, no edema, no erythema, no increased warmth, no induration, no fluctuance, no crepitus.  No BM during exam.  : penile pouch in place, seal intact.  Musculoskeletal:  aROM to b/l UE, b/l le limited aROM, requires 1 person assistance with turning and positioning, no gross deformities or contractures.  Vascular: Deferred, to Podiatry, b/l feet dressings c/d/i.  Skin:  moist w/ good turgor.   Intertriginous folds of posterior trunk along bilateral flank increased moisture, skin intact- linear hyperpigmentation resolved.  Bilateral gluteal folds with denuded epidermis within intertriginous folds <9dop3uya2.1cm, exposing pink moist dermis, periwound skin intact.  Sacrum/gluteal cleft and bilateral buttocks Unstageable pressure injury now stage 4 complicated by moisture and incontinence (pt turned to right side)  -Sacral/gluteal cleft- 8.5cmx3.4vtv0ct (stable), deep tunnel at 11 o'clock extends 5cm (prev 6.5cm - towards left buttock), deep tunnel from 12-1 o'clock towards right posterior ileum extends at least 10cm, undermining at 2-3 o'clock extending 3.5cm.  -Tissue type 40% tan-grey firmly attached nonfluctuant softening slough, surrounding pink-red moist subcutaneous base and agranulation friable with cleansing, unable to palpable bone due to body habitus.  -Moderate seropurulent drainage expressed from deep tunnel from 12-1o'clock, no odor.   -Shallow ulcerations to bilateral buttocks extending from wound edge:   Right buttock shallow ulceration- 8.5cmx6.5cmx0.2cm (prev 54jzu0dpj3.2cm), satellite denuded epidermis 3.5cm from distal edge 9fqg2wlx2.2cm.  Left buttock shallow ulceration- 1.5cmx2.5cmx0.2cm (prev 4.1hwi2uel2.2cm)  Tissue type of satellite ulcerations 100% pink-moist dermis with scattered fibrinous exudate and re-epithelialization migrating from wound edges.  Periwound skin with area of resolving nonfluctuant induration, no longer well defined, extending from right buttock ulceration from 12-3 o'clock unable to measure, remaining periwound skin without obvious erythema, no edema, no increased warmth, no induration, no fluctuance, no crepitus.          LABS/ CULTURES/ RADIOLOGY:              7.8    12.62 >-----------<  455      [04-09-25 @ 05:25]              28.2     137  |  94  |  13  ----------------------------<  81      [04-09-25 @ 05:25]  3.9   |  32  |  1.89        Ca     9.3     [04-09-25 @ 05:25]      Mg     1.80     [04-09-25 @ 05:25]      Phos  6.2     [04-09-25 @ 05:25]                CAPILLARY BLOOD GLUCOSE      POCT Blood Glucose.: 89 mg/dL (09 Apr 2025 11:11)  POCT Blood Glucose.: 97 mg/dL (09 Apr 2025 07:39)  POCT Blood Glucose.: 104 mg/dL (08 Apr 2025 22:32)  POCT Blood Glucose.: 100 mg/dL (08 Apr 2025 17:00)      A1C with Estimated Average Glucose Result: 6.7 % (02-25-25 @ 05:44)      Culture - Blood (collected 04-07-25 @ 07:06)  Source: Blood Blood-Venous  Preliminary Report (04-09-25 @ 09:02):    No growth at 48 Hours    Culture - Blood (collected 04-07-25 @ 06:55)  Source: Blood Blood-Peripheral  Preliminary Report (04-09-25 @ 09:02):    No growth at 48 Hours        ACC: [id number] EXAM:  NM MULTI DAY PROCEDURE   ORDERED BY: CRYSTAL PATEL     ACC: [id number] EXAM:  NM INFLAMM LOC WBC WB SD   ORDERED BY: CRYSTAL PATEL     PROCEDURE DATE:  04/08/2025          INTERPRETATION:  HISTORY/REASON FOR EXAM: 39-year-old male with shortness   of breath, lower extremity edema and fungemia. Referred for evaluation of   focal infection.    RADIOPHARMACEUTICAL: 530 uCi In-111 labeled autologous leukocytes.    TECHNIQUE: Approximately 18-20 hours following administration of 0.553    mCi In-111 labeled autologous leukocytes, whole body imaging in anterior   and posterior views was performed. Additional static images of both   femurs and both feet were obtained.  SPECT of the pelvis was planned but   could not be performed due to technical limitations to accommodate   patient's habitus and only anterior and posterior static images of the   pelvis could be obtained.    COMPARISON: CT chest, abdomen and pelvis 04/03/2025.    SCINTIGRAPHIC FINDINGS: Increased activity could be seen in the right   right side of the pelvis more intense on posterior image but could also   be seen on the anterior image indicating a considerable area of soft   tissue infection. There is also slightly more intense activity in the   right iliac bone compared to the left raising possibility of   osteomyelitis.    Intense activity around the lateral aspect of the right forefoot   compatible with soft tissue infection, presence of complicating   osteomyelitis is not excluded. Milder activity in the medial aspect of   the left forefoot may reflect soft tissue infection. Please correlate   findings with clinical examination.    No focal abnormal activity in the lungs that is suggestive of pneumonia.    IMPRESSION:  1. Large area of soft tissue infection in the right side of the pelvis as   detailed above. Slightly more intense activity in the right iliac bone   compared to the left raises possibility of osteomyelitis.    2. Intense activity in the lateral aspect of the right forefoot   compatible with soft tissue infection; complicating osteomyelitis is not   excluded. Milder activity in the medial aspect of the left forefoot may   reflect soft tissue infection. Please correlate findings with clinical   examination.    3.No evidence of pneumonia.    --- End of Report ---            RADHA GOMEZ MD; Attending Radiologist  This document has been electronically signed. Apr 8 2025  4:49PM

## 2025-04-09 NOTE — CHART NOTE - NSCHARTNOTEFT_GEN_A_CORE
Patient seen and assessed at bedside in RCU  Patient seated comfortably, in no acute distress  Denied abdominal pain, bloating and cramping  Denied SOB, chest pain, nausea/vomiting, fevers/chills, headaches  Discussed case with Dr. Llanes, plan for OR 4/15 for PEG removal  Will need Eliquis held for 72 hours (last dose 4/12 AM)  Patient in agreement with plan of care  Above d/w RCU ACP  Will continue to monitor    Curt Vance PA-C  Department of Thoracic Surgery  [id number]

## 2025-04-09 NOTE — PROGRESS NOTE ADULT - NS ATTEND AMEND GEN_ALL_CORE FT
Pt is a 39M with MHx obesity (Class III, BMI 69), pAfib (no AC), HTN, BEA (dz'ed 2019, AHI 34 non-compliant on CPAP set at 10), and history of b/l papilledema attributed to pseudotumor cerebri initially presenting as a transfer [facility] on 2/26 for acute hypoxemic respiratory failure s/p ETT intubation/MV with progression to refractory ARDS s/p initiation of vv-ECMO support (2/26-3/7) with failure to wean from ventilator s/p tracheostomy placement, further found to have RV failure s/p aggressive diuresis and PNA followed by severe sepsis 2/2 panniculitis c/b candida albicans fungemia now transferred to [facility] for further medical management.     Pt now awake and alert, spontaneously communicative and at mental status baseline. Continues to have severe functional debility likely 2/2 prolonged critical illness polyneuropathy but is progressing well. Now able to transfer via [place] to chair daily and is independent of UE and fine motor function. Off all sedation. Weaning pain control, pt remains dependent on Dilaudid prn for LE neuropathic pain. PO regimen added and gabapentin increased without benefit, now on 800mg TID. Dilaudid increased in frequency/dose given persistent uncontrolled pain with improvement. Will consider transition to fentanyl patch. Pain mgmt team consulted, recs appreciated. Podiatry consulted, possible worsening of right 5th digit that may require podiatric sx. Will CTM for now. No evidence of active infectious process in feet.    Pt with acute combined hypoxemic and hypercapnic respiratory failure with failure to wean from ventilator s/p tracheostomy placement. Pt was tolerating TC QD, PSV (18/10) qhs. Airway clearance therapy in place. Wean O2 supplementation for goal O2 saturation 90-95%. Aspiration precautions and oral hygiene in place. Decannulated at bedside 3/28, continues to use Bilevel NIV qhs. Serial ABGs wnl.     Pt initially p/w RV failure 2/2 pulmHTN now s/p aggressive diuresis with improvement. Now transitioned to PO diuretics with goal to maintain euvolemia. Acetazolamide added today. AFib well controlled conservatively, will continue to monitor on telemetry. TTE 2/25 with new RVE, 3/11 with RVSD. DVT noted on repeat duplex of the right IJ and bilateral peroneal veins. Will c/w full A/C on Eliquis for DVT and AFib.     Pt with oropharyngeal dysphagia s/p PEG placement (3/7) now transitioned to PO diet (3/20) via FEEST. Will continue to monitor on regular PO diet. Bowel regimen in place prn. PPI ppx. Transaminitis improving. Pt with newly diagnosed hepatomegaly.     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 TTE (-) endocarditis. Wound care team following. ID recs appreciated. Further found to have worsening sacral ulceration with induration, CT A/P and MRI spine performed without evidence of underlying tract or drainable fluid collection. Now with acute onset fevers, although without new s/s or complaints and with unclear source. Cx resent, empiric abx initiated with improvement but cx NTD. Fever curve downtrending. Wound care team evaluated and is concerned for worsening tunnelling of sacral wound but CT imaging (-) concern for osteomyelitis or occult infectious sources. Discussed with ID, given persistent sepsis of unclear primary etiology will obtain wagged WBC scan and MRI sacrum.     Dispo pending medical optimization. Pt full code. DVT ppx in place. Palliative consulted, recs appreciated. Will continue to update family and discuss plan of care throughout hospitalization. Dispo planning to acute rehab initiated. Pt is a 39M with MHx obesity (Class III, BMI 69), pAfib (no AC), HTN, BEA (dz'ed 2019, AHI 34 non-compliant on CPAP set at 10), and history of b/l papilledema attributed to pseudotumor cerebri initially presenting as a transfer [facility] on 2/26 for acute hypoxemic respiratory failure s/p ETT intubation/MV with progression to refractory ARDS s/p initiation of vv-ECMO support (2/26-3/7) with failure to wean from ventilator s/p tracheostomy placement, further found to have RV failure s/p aggressive diuresis and PNA followed by severe sepsis 2/2 panniculitis c/b candida albicans fungemia now transferred to [facility] for further medical management.     Pt now awake and alert, spontaneously communicative and at mental status baseline. Continues to have severe functional debility likely 2/2 prolonged critical illness polyneuropathy but is progressing well. Now able to transfer via [place] to chair daily and is independent of UE and fine motor function. Off all sedation. Weaning pain control, pt remains dependent on Dilaudid prn for LE neuropathic pain. PO regimen added and gabapentin increased without benefit, now on 900mg TID. Dilaudid increased in frequency/dose given persistent uncontrolled pain with improvement. Pain mgmt team consulted, recs appreciated. Podiatry consulted, possible worsening of right 5th digit that may require podiatric sx. Will CTM for now. Pt now with NM WBC scan concerning for deep foot infection, will discuss with podiatry.     Pt with acute combined hypoxemic and hypercapnic respiratory failure with failure to wean from ventilator s/p tracheostomy placement. Pt was tolerating TC QD, PSV (18/10) qhs. Airway clearance therapy in place. Wean O2 supplementation for goal O2 saturation 90-95%. Aspiration precautions and oral hygiene in place. Decannulated at bedside 3/28, continues to use Bilevel NIV qhs. Serial ABGs wnl.     Pt initially p/w RV failure 2/2 pulmHTN now s/p aggressive diuresis with improvement. Now transitioned to PO diuretics with goal to maintain euvolemia. Acetazolamide added today. AFib well controlled conservatively, will continue to monitor on telemetry. TTE 2/25 with new RVE, 3/11 with RVSD. DVT noted on repeat duplex of the right IJ and bilateral peroneal veins. Will c/w full A/C on Eliquis for DVT and AFib.     Pt with oropharyngeal dysphagia s/p PEG placement (3/7) now transitioned to PO diet (3/20) via FEEST. Will continue to monitor on regular PO diet. Bowel regimen in place prn. PPI ppx. Transaminitis improving. Pt with newly diagnosed hepatomegaly.     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 TTE (-) endocarditis. Wound care team following. ID recs appreciated. Further found to have worsening sacral ulceration with induration, CT A/P and MRI spine performed without evidence of underlying tract or drainable fluid collection. Now with acute onset fevers, although without new s/s or complaints and with unclear source. Cx resent, empiric abx initiated with improvement but cx NTD. Fever curve downtrending. Wound care team evaluated and is concerned for worsening tunnelling of sacral wound but CT imaging (-) concern for osteomyelitis or occult infectious sources. NM WBC scan performed overnight concerning for active infection with possible osteomyelitis of right pelvis/iliac bone. Will discuss if need for surgical debridement with wound care team. Pt will need long-term Abx ~4 weeks.     Dispo pending medical optimization. Pt full code. DVT ppx in place. Palliative consulted, recs appreciated. Will continue to update family and discuss plan of care throughout hospitalization. Dispo planning to acute rehab initiated.

## 2025-04-09 NOTE — PROGRESS NOTE ADULT - SUBJECTIVE AND OBJECTIVE BOX
CHIEF COMPLAINT:Patient is a 39y old  Male who presents with a chief complaint of sob (02 Mar 2025 06:19)      INTERVAL EVENTS: worsening ELLA.. C/w Zosyn and Caspofungin per ID recs. Complains of persistent LE pain.     ROS: Seen by bedside during AM rounds     OBJECTIVE:  ICU Vital Signs Last 24 Hrs  T(C): 36.9 (09 Apr 2025 05:17), Max: 36.9 (08 Apr 2025 10:30)  T(F): 98.4 (09 Apr 2025 05:17), Max: 98.5 (08 Apr 2025 10:30)  HR: 108 (09 Apr 2025 06:56) (98 - 115)  BP: 152/81 (09 Apr 2025 05:17) (126/66 - 152/81)  RR: 14 (09 Apr 2025 05:17) (14 - 19)  SpO2: 100% (09 Apr 2025 06:56) (97% - 100%)    O2 Parameters below as of 09 Apr 2025 05:17  Patient On (Oxygen Delivery Method): nasal cannula  O2 Flow (L/min): 2              04-06 @ 07:01  -  04-07 @ 07:00  --------------------------------------------------------  IN: 1000 mL / OUT: 6300 mL / NET: -5300 mL      CAPILLARY BLOOD GLUCOSE    POCT Blood Glucose.: 93 mg/dL (08 Apr 2025 07:30)  POCT Blood Glucose.: 108 mg/dL (07 Apr 2025 22:16)  POCT Blood Glucose.: 97 mg/dL (07 Apr 2025 17:10)  POCT Blood Glucose.: 106 mg/dL (07 Apr 2025 11:50)      PHYSICAL EXAM:  General: NAD   Neck: trachea midline.  Cards: S1/S2, no murmurs   Pulm: CTA bilaterally. No wheezes.   Abdomen: Abdomen soft. Morbidly obese abdomen. Nontender. (+) PEG noted, site c/d/i.   Extremities: 1-2+ b/l LE pitting edema. Extremities warm to touch.  Neurology: AOx3. 5/5 motor strength b/l UE. Significant weakness with inability to range b/l LE.  Follows commands. Conversational.   Skin: warm to touch, color appropriate for ethnicity. Refer to RN assessment for further details.        HOSPITAL MEDICATIONS:  MEDICATIONS  (STANDING):  amLODIPine   Tablet 10 milliGRAM(s) Oral daily  apixaban 5 milliGRAM(s) Oral every 12 hours  caspofungin IVPB 70 milliGRAM(s) IV Intermittent every 24 hours  chlorhexidine 2% Cloths 1 Application(s) Topical <User Schedule>  clotrimazole 1% Cream 1 Application(s) Topical two times a day  Dakins Solution - 1/4 Strength 1 Application(s) Topical two times a day  dextrose 50% Injectable 25 Gram(s) IV Push once  dextrose 50% Injectable 12.5 Gram(s) IV Push once  dextrose 50% Injectable 25 Gram(s) IV Push once  furosemide    Tablet 40 milliGRAM(s) Oral two times a day  gabapentin 800 milliGRAM(s) Oral every 8 hours  glucagon  Injectable 1 milliGRAM(s) IntraMuscular once  HYDROmorphone   Tablet 4 milliGRAM(s) Oral every 4 hours  insulin lispro (ADMELOG) corrective regimen sliding scale   SubCutaneous three times a day before meals  insulin lispro (ADMELOG) corrective regimen sliding scale   SubCutaneous at bedtime  lidocaine   4% Patch 1 Patch Transdermal daily  lidocaine   4% Patch 1 Patch Transdermal daily  melatonin 3 milliGRAM(s) Oral at bedtime  multivitamin 1 Tablet(s) Oral daily  mupirocin 2% Ointment 1 Application(s) Topical two times a day  pantoprazole    Tablet 40 milliGRAM(s) Oral before breakfast  piperacillin/tazobactam IVPB.. 3.375 Gram(s) IV Intermittent every 8 hours  polyethylene glycol 3350 17 Gram(s) Oral daily  QUEtiapine 25 milliGRAM(s) Oral at bedtime  senna 2 Tablet(s) Oral at bedtime  vancomycin  IVPB 1000 milliGRAM(s) IV Intermittent every 8 hours    MEDICATIONS  (PRN):  acetaminophen     Tablet .. 650 milliGRAM(s) Oral every 6 hours PRN Temp greater or equal to 38C (100.4F), Mild Pain (1 - 3), Moderate Pain (4 - 6)  albuterol/ipratropium for Nebulization 3 milliLiter(s) Nebulizer every 6 hours PRN Shortness of Breath and/or Wheezing  dextrose Oral Gel 15 Gram(s) Oral once PRN Blood Glucose LESS THAN 70 milliGRAM(s)/deciliter  HYDROmorphone  Injectable 0.5 milliGRAM(s) IV Push every 6 hours PRN dressing changes and severe breakthrough      LABS:                                   8.0    13.91 )-----------( 422      ( 08 Apr 2025 06:10 )             24.9   04-08    136  |  93[L]  |  12  ----------------------------<  92  3.7   |  32[H]  |  1.84[H]    Ca    9.2      08 Apr 2025 06:10  Phos  6.1     04-08  Mg     1.80     04-08        Urinalysis Basic - ( 06 Apr 2025 06:25 )    Color: x / Appearance: x / SG: x / pH: x  Gluc: 90 mg/dL / Ketone: x  / Bili: x / Urobili: x   Blood: x / Protein: x / Nitrite: x   Leuk Esterase: x / RBC: x / WBC x   Sq Epi: x / Non Sq Epi: x / Bacteria: x         CHIEF COMPLAINT:Patient is a 39y old  Male who presents with a chief complaint of sob (02 Mar 2025 06:19)      INTERVAL EVENTS: worsening ELLA.. C/w Zosyn and Caspofungin per ID recs. Complains of persistent LE pain.     ROS: Seen by bedside during AM rounds     OBJECTIVE:  ICU Vital Signs Last 24 Hrs  T(C): 36.9 (09 Apr 2025 05:17), Max: 36.9 (08 Apr 2025 10:30)  T(F): 98.4 (09 Apr 2025 05:17), Max: 98.5 (08 Apr 2025 10:30)  HR: 108 (09 Apr 2025 06:56) (98 - 115)  BP: 152/81 (09 Apr 2025 05:17) (126/66 - 152/81)  RR: 14 (09 Apr 2025 05:17) (14 - 19)  SpO2: 100% (09 Apr 2025 06:56) (97% - 100%)    O2 Parameters below as of 09 Apr 2025 05:17  Patient On (Oxygen Delivery Method): nasal cannula  O2 Flow (L/min): 2              04-06 @ 07:01  -  04-07 @ 07:00  --------------------------------------------------------  IN: 1000 mL / OUT: 6300 mL / NET: -5300 mL      CAPILLARY BLOOD GLUCOSE    POCT Blood Glucose.: 93 mg/dL (08 Apr 2025 07:30)  POCT Blood Glucose.: 108 mg/dL (07 Apr 2025 22:16)  POCT Blood Glucose.: 97 mg/dL (07 Apr 2025 17:10)  POCT Blood Glucose.: 106 mg/dL (07 Apr 2025 11:50)      PHYSICAL EXAM:  General: NAD   Neck: trachea midline.  Cards: S1/S2, no murmurs   Pulm: CTA bilaterally. No wheezes.   Abdomen: Abdomen soft. Morbidly obese abdomen. Nontender. (+) PEG noted, site c/d/i.   Extremities: 1-2+ b/l LE pitting edema. Extremities warm to touch.  Neurology: AOx3. 5/5 motor strength b/l UE. Significant weakness with inability to range b/l LE.  Follows commands. Conversational.   Skin: warm to touch, color appropriate for ethnicity. Refer to RN assessment for further details.        HOSPITAL MEDICATIONS:  MEDICATIONS  (STANDING):  amLODIPine   Tablet 10 milliGRAM(s) Oral daily  apixaban 5 milliGRAM(s) Oral every 12 hours  caspofungin IVPB 70 milliGRAM(s) IV Intermittent every 24 hours  chlorhexidine 2% Cloths 1 Application(s) Topical <User Schedule>  clotrimazole 1% Cream 1 Application(s) Topical two times a day  Dakins Solution - 1/4 Strength 1 Application(s) Topical two times a day  dextrose 50% Injectable 25 Gram(s) IV Push once  dextrose 50% Injectable 12.5 Gram(s) IV Push once  dextrose 50% Injectable 25 Gram(s) IV Push once  furosemide    Tablet 40 milliGRAM(s) Oral two times a day  gabapentin 800 milliGRAM(s) Oral every 8 hours  glucagon  Injectable 1 milliGRAM(s) IntraMuscular once  HYDROmorphone   Tablet 4 milliGRAM(s) Oral every 4 hours  insulin lispro (ADMELOG) corrective regimen sliding scale   SubCutaneous three times a day before meals  insulin lispro (ADMELOG) corrective regimen sliding scale   SubCutaneous at bedtime  lidocaine   4% Patch 1 Patch Transdermal daily  lidocaine   4% Patch 1 Patch Transdermal daily  melatonin 3 milliGRAM(s) Oral at bedtime  multivitamin 1 Tablet(s) Oral daily  mupirocin 2% Ointment 1 Application(s) Topical two times a day  pantoprazole    Tablet 40 milliGRAM(s) Oral before breakfast  piperacillin/tazobactam IVPB.. 3.375 Gram(s) IV Intermittent every 8 hours  polyethylene glycol 3350 17 Gram(s) Oral daily  QUEtiapine 25 milliGRAM(s) Oral at bedtime  senna 2 Tablet(s) Oral at bedtime  vancomycin  IVPB 1000 milliGRAM(s) IV Intermittent every 8 hours    MEDICATIONS  (PRN):  acetaminophen     Tablet .. 650 milliGRAM(s) Oral every 6 hours PRN Temp greater or equal to 38C (100.4F), Mild Pain (1 - 3), Moderate Pain (4 - 6)  albuterol/ipratropium for Nebulization 3 milliLiter(s) Nebulizer every 6 hours PRN Shortness of Breath and/or Wheezing  dextrose Oral Gel 15 Gram(s) Oral once PRN Blood Glucose LESS THAN 70 milliGRAM(s)/deciliter  HYDROmorphone  Injectable 0.5 milliGRAM(s) IV Push every 6 hours PRN dressing changes and severe breakthrough      LABS:                        8.0    13.91 )-----------( 422      ( 08 Apr 2025 06:10 )             24.9   04-08    136  |  93[L]  |  12  ----------------------------<  92  3.7   |  32[H]  |  1.84[H]    Ca    9.2      08 Apr 2025 06:10  Phos  6.1     04-08  Mg     1.80     04-08        Urinalysis Basic - ( 06 Apr 2025 06:25 )    Color: x / Appearance: x / SG: x / pH: x  Gluc: 90 mg/dL / Ketone: x  / Bili: x / Urobili: x   Blood: x / Protein: x / Nitrite: x   Leuk Esterase: x / RBC: x / WBC x   Sq Epi: x / Non Sq Epi: x / Bacteria: x         CHIEF COMPLAINT:Patient is a 39y old  Male who presents with a chief complaint of sob (02 Mar 2025 06:19)      INTERVAL EVENTS: worsening ELLA.. C/w Zosyn and Caspofungin per ID recs. Complains of persistent LE pain.     ROS: Seen by bedside during AM rounds     OBJECTIVE:  ICU Vital Signs Last 24 Hrs  T(C): 36.9 (09 Apr 2025 05:17), Max: 36.9 (08 Apr 2025 10:30)  T(F): 98.4 (09 Apr 2025 05:17), Max: 98.5 (08 Apr 2025 10:30)  HR: 108 (09 Apr 2025 06:56) (98 - 115)  BP: 152/81 (09 Apr 2025 05:17) (126/66 - 152/81)  RR: 14 (09 Apr 2025 05:17) (14 - 19)  SpO2: 100% (09 Apr 2025 06:56) (97% - 100%)    O2 Parameters below as of 09 Apr 2025 05:17  Patient On (Oxygen Delivery Method): nasal cannula  O2 Flow (L/min): 2      04-06 @ 07:01  -  04-07 @ 07:00  --------------------------------------------------------  IN: 1000 mL / OUT: 6300 mL / NET: -5300 mL      CAPILLARY BLOOD GLUCOSE    POCT Blood Glucose.: 93 mg/dL (08 Apr 2025 07:30)  POCT Blood Glucose.: 108 mg/dL (07 Apr 2025 22:16)  POCT Blood Glucose.: 97 mg/dL (07 Apr 2025 17:10)  POCT Blood Glucose.: 106 mg/dL (07 Apr 2025 11:50)      PHYSICAL EXAM:  General: NAD   Neck: trachea midline.  Cards: S1/S2, no murmurs   Pulm: CTA bilaterally. No wheezes.   Abdomen: Abdomen soft. Morbidly obese abdomen. Nontender. (+) PEG noted, site c/d/i.   Extremities: 1-2+ b/l LE pitting edema. Extremities warm to touch.  Neurology: AOx3. 5/5 motor strength b/l UE. Significant weakness with inability to range b/l LE.  Follows commands. Conversational.   Skin: warm to touch, color appropriate for ethnicity. Refer to RN assessment for further details.        HOSPITAL MEDICATIONS:  MEDICATIONS  (STANDING):  amLODIPine   Tablet 10 milliGRAM(s) Oral daily  apixaban 5 milliGRAM(s) Oral every 12 hours  caspofungin IVPB 70 milliGRAM(s) IV Intermittent every 24 hours  chlorhexidine 2% Cloths 1 Application(s) Topical <User Schedule>  clotrimazole 1% Cream 1 Application(s) Topical two times a day  Dakins Solution - 1/4 Strength 1 Application(s) Topical two times a day  dextrose 50% Injectable 25 Gram(s) IV Push once  dextrose 50% Injectable 12.5 Gram(s) IV Push once  dextrose 50% Injectable 25 Gram(s) IV Push once  furosemide    Tablet 40 milliGRAM(s) Oral two times a day  gabapentin 800 milliGRAM(s) Oral every 8 hours  glucagon  Injectable 1 milliGRAM(s) IntraMuscular once  HYDROmorphone   Tablet 4 milliGRAM(s) Oral every 4 hours  insulin lispro (ADMELOG) corrective regimen sliding scale   SubCutaneous three times a day before meals  insulin lispro (ADMELOG) corrective regimen sliding scale   SubCutaneous at bedtime  lidocaine   4% Patch 1 Patch Transdermal daily  lidocaine   4% Patch 1 Patch Transdermal daily  melatonin 3 milliGRAM(s) Oral at bedtime  multivitamin 1 Tablet(s) Oral daily  mupirocin 2% Ointment 1 Application(s) Topical two times a day  pantoprazole    Tablet 40 milliGRAM(s) Oral before breakfast  piperacillin/tazobactam IVPB.. 3.375 Gram(s) IV Intermittent every 8 hours  polyethylene glycol 3350 17 Gram(s) Oral daily  QUEtiapine 25 milliGRAM(s) Oral at bedtime  senna 2 Tablet(s) Oral at bedtime  vancomycin  IVPB 1000 milliGRAM(s) IV Intermittent every 8 hours    MEDICATIONS  (PRN):  acetaminophen     Tablet .. 650 milliGRAM(s) Oral every 6 hours PRN Temp greater or equal to 38C (100.4F), Mild Pain (1 - 3), Moderate Pain (4 - 6)  albuterol/ipratropium for Nebulization 3 milliLiter(s) Nebulizer every 6 hours PRN Shortness of Breath and/or Wheezing  dextrose Oral Gel 15 Gram(s) Oral once PRN Blood Glucose LESS THAN 70 milliGRAM(s)/deciliter  HYDROmorphone  Injectable 0.5 milliGRAM(s) IV Push every 6 hours PRN dressing changes and severe breakthrough      LABS:                        8.0    13.91 )-----------( 422      ( 08 Apr 2025 06:10 )             24.9   04-08    136  |  93[L]  |  12  ----------------------------<  92  3.7   |  32[H]  |  1.84[H]    Ca    9.2      08 Apr 2025 06:10  Phos  6.1     04-08  Mg     1.80     04-08        Urinalysis Basic - ( 06 Apr 2025 06:25 )    Color: x / Appearance: x / SG: x / pH: x  Gluc: 90 mg/dL / Ketone: x  / Bili: x / Urobili: x   Blood: x / Protein: x / Nitrite: x   Leuk Esterase: x / RBC: x / WBC x   Sq Epi: x / Non Sq Epi: x / Bacteria: x

## 2025-04-09 NOTE — PROGRESS NOTE ADULT - SUBJECTIVE AND OBJECTIVE BOX
Patient is a 39y old  Male who presents with a chief complaint of sob (02 Mar 2025 06:19)       INTERVAL HPI/OVERNIGHT EVENTS:  Patient seen and evaluated at bedside.  Pt is resting comfortable in NAD. Denies N/V/F/C.  Pain rated at X/10    Allergies    No Known Allergies    Intolerances        Vital Signs Last 24 Hrs  T(C): 36.9 (09 Apr 2025 05:17), Max: 36.9 (08 Apr 2025 10:30)  T(F): 98.4 (09 Apr 2025 05:17), Max: 98.5 (08 Apr 2025 10:30)  HR: 108 (09 Apr 2025 06:56) (98 - 115)  BP: 152/81 (09 Apr 2025 05:17) (126/66 - 152/81)  BP(mean): --  RR: 14 (09 Apr 2025 05:17) (14 - 19)  SpO2: 100% (09 Apr 2025 06:56) (97% - 100%)    Parameters below as of 09 Apr 2025 05:17  Patient On (Oxygen Delivery Method): nasal cannula  O2 Flow (L/min): 2      LABS:                        8.0    13.91 )-----------( 422      ( 08 Apr 2025 06:10 )             24.9     04-09    137  |  94[L]  |  13  ----------------------------<  81  3.9   |  32[H]  |  1.89[H]    Ca    9.3      09 Apr 2025 05:25  Phos  6.2     04-09  Mg     1.80     04-09        Urinalysis Basic - ( 09 Apr 2025 05:25 )    Color: x / Appearance: x / SG: x / pH: x  Gluc: 81 mg/dL / Ketone: x  / Bili: x / Urobili: x   Blood: x / Protein: x / Nitrite: x   Leuk Esterase: x / RBC: x / WBC x   Sq Epi: x / Non Sq Epi: x / Bacteria: x      CAPILLARY BLOOD GLUCOSE      POCT Blood Glucose.: 97 mg/dL (09 Apr 2025 07:39)  POCT Blood Glucose.: 104 mg/dL (08 Apr 2025 22:32)  POCT Blood Glucose.: 100 mg/dL (08 Apr 2025 17:00)  POCT Blood Glucose.: 100 mg/dL (08 Apr 2025 11:21)      Lower Extremity Physical Exam:  Vascular: DP 1/4 B/L, PT 0/4, B/L secondary to +3 pitting edema, CFT <3 seconds B/L, Temperature gradient warm to cool, B/L.   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 Right foot plantar lateral forefoot wound to subQ, no malodor, no pus, no acute signs of infection. Left foot digit 1,2,4 partial thickness dry gangrene. Left foot lateral plantar forefoot wound to subQ, no malodor, no pus, no acute signs of infection.    RADIOLOGY & ADDITIONAL TESTS:

## 2025-04-09 NOTE — PROGRESS NOTE ADULT - ASSESSMENT
This is a 38 y/o M w/ PMHx AF (not on AC), HTN, BEA, pseudotumor cerebri s/p LP in 2024, initially presented to [facility] on 2/24, SOB, LE edema with URI symptoms and sick contacts, noted to have severe ARDS, intubated however still hypoxia, cannulated for VV ECMO on 2/25, transferred to [facility] on 2/25, started on empiric Vanco, Zosyn for multifocal PNA, abdominal wall cellulitis, s/p trach placement by CT surgery on 3/7, ECMO decannulated on 3/7. Zosyn held on 3/9.  C/b fevers on 3/10, restarted on Zosyn, transferred to [facility] on 3/13, Bcx now w/ yeast.    #Candida albicans fungemia   #Fevers   #Multifocal PNA, abdominal wall cellulitis s/p Vanco/Zosyn  #ARDS, hypoxic respiratory failure requiring VV EMCO 2/2 multifocal PNA? s/p decannulation on 3/7    Overall, 38 y/o M w/ PMHx AF (not on AC), HTN, BEA, pseudotumor cerebri s/p LP in 2024 admitted to [facility] on 2/26 for ARDS, hypoxic respiratory failure requiring VV EMCO 2/2 multifocal PNA? s/p decannulation on 3/7, c/b abdominal wall cellulitis s/p Vancomycin/Zosyn, s/p trach on 3/7, in the setting of persistent fevers despite Zosyn, BCx now w/ yeast, Candida albicans   Unclear source for yeast in BCx, pt had all central lines removed on 3/7, not getting TPN, no abdominal pain on exam, PEG site well appearing.     CT A/P w/o clear abdominal source of fungemia, possibly 2/2 abdominal wall cellulitis? TTE w/o IE.  BCx 3/18 1/2 positive, 3/20 NGTD x 2.     Pt with worsening sepsis on 3/30, febrile to 103, WBC 16.   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   Pt with deep tunneled wound to left, no drainage, malodor per wound care.   CT A/P w/ b/l pulmonary opacities but improved, no sacral abscess   WBC scan with soft tissue infection R side of pelvis, intense activity in R iliac bone possible OM. Lateral R forefoot with soft tissue infection, possible OM?    Recommendations;   1. Continue with Zosyn 3.375 g 8, plan on 6 week course for possible OM until 5/11  2. Caspofungin 70 mg daily, will plan on 4 week course ending 4/15  3. Would obtain WBC scan given sepsis w/o clear source  4. Obtain MRI pelvis to eval for OM, would treat for 6 weeks if positive.     Thank you for consulting us and involving us in the management of this patient's case. In addition to reviewing history, imaging, documents, labs, microbiology, and infection control strategies and potential issues.     ID will continue to follow    Shailesh Owens M.D.  Attending Physician  Division of Infectious Diseases  Department of Medicine    Please contact through MS Teams message.  Office: [phone] (after 5 PM or weekend)

## 2025-04-09 NOTE — PROGRESS NOTE ADULT - ASSESSMENT
Assessment/Plan:38 YO M with PMHx of morbid obesity, BEA on CPAP, pAFIB (not on AC), HTN, and BL papilledema attributed to pseudotumor cerebri who presented initially to [facility] on 2/24 with SOB and BL LE edema. Found with URI outpatient with progressive SOB, and concern for noted for MFPNA on imaging. Course progressed with AHRF with worsening O2 demand, respiratory distress, secretions, and somnolence requiring intubation (difficult with success after 6 attempts) in ED and admission to [facility] MICU. While in MICU, patient noted with increasing O2 demand with no improvement despite optimal vent management. Concern noted for progressive ARDS, unable to prone given morbid obesity, and ultimately cannulated for vvECMO on 2/25 and transferred to [facility] for further management on 2/26. While at [facility], tx with diuresis and ABX, and ultimately decannulated and s/p [id number] trach and PEG on 3/7. Patient ultimately transferred to [facility] on 3/11 and course complicated by recurrent high grade fevers and found with fungemia.     Wound care follow up for:  -Sacral/gluteal cleft unstageable pressure injury complicated by moisture and incontinence associated dermatitis - now stage 4 pressure injury.   -Intertriginous moisture associated dermatitis to bilateral gluteal folds and right groin.  -Risk of intertriginous dermatitis to remaining intertriginous folds  -Peritubular skin management of PEG - denuded epidermis now healed.    Plan:  -Per records initially with deep tissue pressure injury --> evolved to unstageable pressure --> now evolved to stage 4 pressure injury, remains with increased moisture secondary to body habitus, fecal and urinary incontinence, history of critical illness (mechanical ventilation > 72 hours, sepsis), and limited mobility.   -Treated with Dakins 1/4 strength.  -Tissue type predominantly stable, adherent slough softening now revealing deep tunnel from 12 - 1 o'clock with moderate seropurulent nonodorous drainage.  -Right buttock area of nonfluctuant induration seems to be slightly improving, maybe due to spontaneously draining tunnel?  -Unsure if tunnel extends to bone but is in direction of iliac crest; consistent with findings of WBC scan  -Unable to perform bedside debridement- viable base friable, along with body habitus limiting ability to safely debride slough deep within wound bed, risk of bleeding outweighs benefit   -MRI lumbar spine- with no mention of sacral ulcer- likely unable to see given area of focus.  Would recommend repeat MRI with extended cuts to include sacrum and bilateral buttocks r/o deep abscess. Due to body habitus difficult to identify whether or not there is a drainable deep abscess, with findings from WBC scan, repeat MRI would likely be beneficial.  -Patient seen with ID Dr. Owens as bedside, no culture taken, area of tunnel within necrotic base; unable to isolate viable area to obtain culture.  -Per ID will continue with Abx to treat OM  -Will continue topical recommendations and monitor closely/will follow up tomorrow.  -Topical recommendations:   ·	Sacral/gluteal cleft to bilateral buttocks- cleanse wound and satellite ulcerations to b/l buttocks with NS, Dry well. Apply Liquid barrier film to periwound skin. Apply hydrocolloid to left buttock shallow ulceration, massage to facilitate adhesion of dressing- if dressing is not soiled may change every 3 days. Apply Aquacel hydrofiber sheet to right buttock, pack sacral/gluteal cleft including deep tunnels at 11 o'clock and 12- 1 o'clock with Dakins 1/4 strength moistened 2" kerlix, leave at least 2" out at end to ensure full removal of packing with subsequent dressing changes. Cover entire area (sacrum and right buttock) with abdominal pad and tegaderm. Change twice a day and prn if soiled/compromised. Once dressing is in place and perineal care is provided, apply TRIAD moisture barrier cream to exposed skin every shift and prn.  ·	Peritubular Skin of PEG: Cleanse q shift with NS, apply liquid barrier film beneath matt disc.  Apply thin foam  dressing without border (Mepilex Lite)- cut to mid dressing with a 'Y' shape. Secondary securement to abdomen taping in 'H' fashion with Steri-strips.  ·	Bilateral abdominal pannus, bilateral groin: Cleanse with luke-warm soap and water, dry well. Apply Interdry textile sheeting, under intertriginous folds leaving 2 inches exposed at ends to wick, remove to wash & dry affected area, then replace. Individual sheeting may be used for up to 5 days unless soiled. Inspect skin daily.   -Continue to offload pressure; bariatric low airloss support surface, turn and position per protocol with use of fluidized positioning devices, continue incontinence and moisture care per protocol and use of single breathable incontinence pads, continue to offload heels with fluidized positioning devices/Ochoa-Lock positioning device (PS# [id number]). Continue use of bariatric seat cushion (people soft #[id number]) and limit sit time- no more than 2 consecutive hours at any given time.   -Continue Nutritional management as per RD recommendations.  -B/l feet management per Podiatry surgery   -Appreciate PT and safe patient handling    Upon discharge follow up at outpatient [facility] [place]. [address] [place] [address]. [phone].    Findings and plan discussed with patient, ID Dr. Owens and primary team MD Dr. Siddiqui. All questions and concerns addressed to meet patient's satisfaction.  Will continue to follow periodically while inpatient, please reconsult earlier as needed.  Remainder of care per primary team.  Thank you.    EDOUARD Driscoll, ROSEANNAN   MS TEAMS    If after 4PM or before 7:30AM on Mon-Friday or weekend/holiday please contact general surgery for urgent matters.   Team A- [id number]   Team B- [id number]  For non-urgent matters e-mail [email or web address]    I spent 50 minutes face-to-face with this patient of which more than 50% of the time was spent counseling/coordinating care of this patient.

## 2025-04-09 NOTE — PROGRESS NOTE ADULT - ASSESSMENT
38 YO M with PMHx of morbid obesity, BEA on CPAP, pAFIB (not on AC), HTN, and BL papilledema attributed to pseudotumor cerebri who presented initially to [facility] on 2/24 with SOB and BL LE edema. Found with URI outpatient with progressive SOB, and concern for noted for MFPNA on imaging. Course progressed with AHRF with worsening O2 demand, respiratory distress, secretions, and somnolence requiring intubation (difficult with success after 6 attempts) in ED and admission to [facility] MICU. While in MICU, patient noted with increasing O2 demand with no improvement despite optimal vent management. Concern noted for progressive ARDS, unable to prone given morbid obesity, and ultimately cannulated for vvECMO on 2/25 and transferred to [facility] for further management on 2/26. While at [facility], tx with diuresis and ABX, and ultimately decannulated and s/p 60XLTCP trach and PEG on 3/7. Patient ultimately transferred to [facility] on 3/11 and course complicated by recurrent high grade fevers and found with fungemia.    NEUROLOGY  # Hx of Pseudotumor Cerebri   - s/p LP in 2024 with high opening pressure  - s/p MRI 2024 with possible pituitary mass but unclear because of pressure effect from pseudotumor cerebri causing partially empty sella.  - Prolactin elevated   - ACTH low but received steroids during admission   - FT4 low normal and TSH normal, but given FT4 low normal TSH should be higher   - Discuss endocrine consult for inhouse work up vs outpatient.     # Sedation   - Weaned off sedation in ICU   - Nimbex turned off 2/27   - Prop turned off 3/9   - Precedex turned off and catapres started 3/11  - catapres 0.2 TID (decreased 3/25) - tapered down 3/31 to 0.1 tid - discontinued 4/3     # Insomnia   - Patient worked night shift x 15 years   - Trouble sleeping at night leading to day time sleepiness and poor participation with PT  - Continue on Seroquel 25 QHS (increased 3/18)   - Continue on melatonin   - Continue on Neurontin as below    # BL LE neuropathy   - CIPN concern   - Neurontin increased to 300mg Q8H with relief   - added Percocet Q4H for moderate pain (3/25) and continue on Dilaudid Q6H for severe pain   - Pain mgt called to consult - with recs for tylenol ATC, NEurontin increased to 400mg q6, Oxycodone 5 q4h prn , Dilaudid for BREAKTHROUGH pain only, Lidocaine patches on each leg, ice prn to feet  - Some improvement in pain - PT consult for TENS,   - oxycodone inc to 10mg as pt with no relief /buttocks area indurated /tender CT ordered -no abcess  - C/w Gabapentin : 600/600/800 and uptitrate as required  - Switched to PO Dilaudid 2mg Q6H PRN moderate-severe and IV Dilaudid PRN severe breakthrough and with dressing changes   - Still with severe pain 10/10 despite change to dilaudid will increase to 4mg q4h prn with iv dilaudid breakthrough  - Neurontin increased to 800mg q8h Monitor neuro status/lethergy   - I have reviewed with pt to not wait to long for dose of pain medication to  prevent pain from escalating   -Meds reviewed despite being in constant pain pt not taking dilaudid as often as he can often with 10/10 pain DIlaudid will be every 4 hours with parameters and pt can refuse Will reconsult pain mgt again for any recommendations on monday     CARDIOVASCULAR  # HTN   - HTN noted in ICU requiring cardene and esmolol GTTs   - Lisinopril dc'ed to increase catapres   - Continue on norvasc 10 and catapres 0.3 TID   - Monitor BP     # AFIB   - Hx of pAFIB   - No RVR episodes or pAFIB episodes in ICU   - No rate control medications needed  - Monitor on telemetry     # RV dilation second to PHTN   - POCUS on arrival to [facility] with RVE concern   - TTE 10/2024 with EF 55-60 with normal LVSF, moderate LVH and normal RVSF and size with no concern for pHTN   - TTE on 2/25 at  with EF 61 and normal LVSF, but enlarged RV size with reduced RVSF, mild TR, mild PHTN with PASP 49, and dilated IVC to 3.4cn, but normal TAPSE 2.1.  - Continue on aggressive diuresis in ICU    - TTE 3/11 with RVSF reduced with TAPSE 1.6. IVC improved to 2.12cm.   - Continue on lasix 40 PO BID   - Monitor IO/BMP    RESPIRATORY  # ARDS second to MFPNA   - Hx of BEA on CPAP presented to  post URI with SOB and found with AHRF with progression to ARDS second to post viral MFPNA   - Intubated 2/25   - Cannulated for vvECMO 2/26   - s/p 60XLTCP tracheostomy 3/7   - Decannulated 3/7   - CTSx removed tracheostomy and ECMO sutures on 3/17   - Continue on TC QD with PMV as able with strong phonation  - Continue on PS 18/10/40 QHS given OHS   - Continue on nebs and HTS   -  trials continue and now trialing overnight with BIPAP for OHS (10/8/12)  - Decannulated 3/28   - Using nasal cannula 4L     GI  # Dysphagia   - s/p PEG 3/7   - Attempted green dye on 3/13 and failed  - Continue on TF  - Continue on miralax and senna  - RPT green dye passed 3/19  - 3/20 Passed FEEST - on soft bite sized with thin liquids Added consistent carb 2/2 weight/Elevated A1c     # Mild transaminitis   - LFTs elevated in ICU with TBILI elevated likely from ECMO hemolysis   - US ABD with hepatic steatosis   - Trend LFTs      RENAL  # ELLA   - ELLA likely from cardiorenal with RVE   - Diuresed in ICU and improved   - Monitor renal function and UOP   - Recurrent ELLA noted suspected in s/o vancomycin toxicity (4/7) now pending urine studies/UA and will attempt IV hydration     # Hypernatremia   - Hypernatremia with fever spikes   - Fever curve improved and attempting to wean FWF   - Continue on  Q6H (decreased 3/19)    INFECTIOUS DISEASE  # Recurrent fevers with fungemia from unknown source   - Recurrent fevers post ECMO decannulation thought initially to be cytokine surge   - BCx, SCx, UCx, RVP and MRSA RPT on 3/12 negative   - Completed zosyn (3/12-3/18) empirically with improved WBC , however recurrent fevers noted.   - RPT SCx, UA, CXR and RVP negative  - TTE 3/17 with no IE  - PanCT 3/17 with PNA and persistent panniculitis   - BCx 3/15 and 3/16 with candida albicans.   - Concern for possible source from panniculitis   - Continue on caspo (3/17 - ) and RPT BCx 3/18 negative  - ID following  - plan for 2 week course after negative BCx to 4/2  - 3/20 Bcx- NGTD  - 3/30 spiked temp cx's sent + ua and c/s pending Bcx neg to date - started on vanco /zosyn ID following  - Vanco stopped (4/7) and will c/w empiric Zosyn/Caspofungin for now   - 4/3 - persistent fevers, leukocytosis. Will repeat CT C/A/P to evaluate for source - no source found- Bilat lung opacities mildly decreased         # MFPNA and abdominal pannus cellulitis   - Presented to  post URI with SOB and found with AHRF with progression to ARDS second to post viral MFPNA   - RVP, legionella, strept, BAL, BCx, UCx, HIV, PCP, and adenovirus PCR negative   - Initially started on multiple agents in ICU and ultimatelty completed zosyn (2/26-3/9) ZMAX (2/25-2/26) and vanco (2/26-3/1)     # R/o Sacral OM  - Noted with hard indurated sacral wound by Wound Care Team  - CT Pelvis (3/29) revealing superficial skin thickening overlying the sacrum with underlying edema of the subcutaneous fat, in keeping with the clinical history of sacral wound. No evidence of underlying tract or drainable fluid collection.  - CT 4/3 - no fluid collection in sacral tissue   - Given ongoing fever with no clear source with obtain MRI Pelvis per ID recs to r/o sacral OM once renal fx improves    # Penile discharge   - GC/ chlamydia negative   - HIV negative   - Monitor for now    # PPX   - MRSA PCR positive and completed bactroban (2/26-3/3)     HEME   # Anemia likely second to ECMO circuit vs AOCD   - HH low in ICU second to ECMO circuit   - HH dropping again today however no bleeding noted   - Microcytic and hemochromic, sent anemia panel 3/19  - Trend HH     VASCULAR   # R IJ and BL LE DVTs   - Post ECMO dopplers on 3/9 negative for BL UE/ LE DVTs.   - Subsequent post ECMO dopplers performed on 3/14 and noted with acute, non-occlusive deep vein thrombosis noted within the right internal jugular vein. Acute, occlusive deep vein thromboses noted within the right and left peroneal veins at both mid calves, and age-indeterminate, occlusive deep vein thrombosis visualized within the left gastrocnemius vein at the proximal calf.     - Continue on argatroban GTT and change to eliquis     PODIATRY   # BL plantar feet blisters likely pressors induced  - Seen by podiatry and blisters lanced on 3/4 and again on 3/18  - Continue on local wound care per podiatry   - Podiatry following  - OK for WBAT will fu with PT for offloading shoe if appropriate  - Pain mgt consult   - Podiatry FU 3/28 / 4/3 done /following     ENDOCRINE  # Pre-DM2   - A1C 6.7  - Continue on ISS   - Monitor FS   - IF no demand then stop ISS     LINES/ TUBES  - R IJ and R FEM ECMO (2/26-3/7)     SKIN  # Pannus canndial intertrigo   # Sacrum/ buttock MAD  - Continue on clotrimazole cream   - WOC appreciated   -Seen by WC pt noted to have oozing from Buttock wound surgicel placed by wound care On follow up no further bleeding  - Wound care placed orders   - No bleeding from wound     ETHICS/ GOC    - FULL CODE     DISPO - PT following  Seen by PMR for acute rehab per recs   40 YO M with PMHx of morbid obesity, BEA on CPAP, pAFIB (not on AC), HTN, and BL papilledema attributed to pseudotumor cerebri who presented initially to [facility] on 2/24 with SOB and BL LE edema. Found with URI outpatient with progressive SOB, and concern for noted for MFPNA on imaging. Course progressed with AHRF with worsening O2 demand, respiratory distress, secretions, and somnolence requiring intubation (difficult with success after 6 attempts) in ED and admission to [facility] MICU. While in MICU, patient noted with increasing O2 demand with no improvement despite optimal vent management. Concern noted for progressive ARDS, unable to prone given morbid obesity, and ultimately cannulated for vvECMO on 2/25 and transferred to [facility] for further management on 2/26. While at [facility], tx with diuresis and ABX, and ultimately decannulated and s/p 60XLTCP trach and PEG on 3/7. Patient ultimately transferred to [facility] on 3/11 and course complicated by recurrent high grade fevers and found with fungemia.    NEUROLOGY  # Hx of Pseudotumor Cerebri   - s/p LP in 2024 with high opening pressure  - s/p MRI 2024 with possible pituitary mass but unclear because of pressure effect from pseudotumor cerebri causing partially empty sella.  - Prolactin elevated   - ACTH low but received steroids during admission   - FT4 low normal and TSH normal, but given FT4 low normal TSH should be higher   - Discuss endocrine consult for inhouse work up vs outpatient.     # Sedation   - Weaned off sedation in ICU   - Nimbex turned off 2/27   - Prop turned off 3/9   - Precedex turned off and catapres started 3/11  - catapres 0.2 TID (decreased 3/25) - tapered down 3/31 to 0.1 tid - discontinued 4/3     # Insomnia   - Patient worked night shift x 15 years   - Trouble sleeping at night leading to day time sleepiness and poor participation with PT  - Continue on Seroquel 25 QHS (increased 3/18)   - Continue on melatonin   - Continue on Neurontin as below    # BL LE neuropathy   - CIPN concern   - Neurontin increased to 300mg Q8H with relief   - added Percocet Q4H for moderate pain (3/25) and continue on Dilaudid Q6H for severe pain   - Pain mgt called to consult - recs for tylenol ATC, NEurontin increased to 400mg q6, Oxycodone 5 q4h prn , Dilaudid for BREAKTHROUGH pain only, Lidocaine patches on each leg, ice prn to feet  - Some improvement in pain - PT consult for TENS,   - oxycodone inc to 10mg as pt with no relief /buttocks area indurated /tender CT ordered -no abcess  - C/w Gabapentin : 600/600/800 and uptitrate as required  - Switched to PO Dilaudid 2mg Q6H PRN moderate-severe and IV Dilaudid PRN severe breakthrough and with dressing changes   - Still with severe pain 10/10 despite change to dilaudid will increase to 4mg q4h prn with iv dilaudid breakthrough  - Neurontin increased to 800mg q8h Monitor neuro status/lethergy   - I have reviewed with pt to not wait to long for dose of pain medication to  prevent pain from escalating   -Meds reviewed despite being in constant pain pt not taking dilaudid as often as he can often with 10/10 pain DIlaudid will be every 4 hours with parameters and pt can refuse Will reconsult pain mgt again for any recommendations on monday   - Pain management reconsulted , recs to stop Dilaudid PO ATC , continue Dilaudid for breakthrough pain with dressing changes, increase gabapentin to 900 mg Q8, add Cymbalta 30 mg QD and Flexeril 5mg Q8    CARDIOVASCULAR  # HTN   - HTN noted in ICU requiring cardene and esmolol GTTs   - Lisinopril dc'ed to increase catapres   - Continue on norvasc 10 and catapres 0.3 TID   - Monitor BP     # AFIB   - Hx of pAFIB   - No RVR episodes or pAFIB episodes in ICU   - No rate control medications needed  - Monitor on telemetry     # RV dilation second to PHTN   - POCUS on arrival to [facility] with RVE concern   - TTE 10/2024 with EF 55-60 with normal LVSF, moderate LVH and normal RVSF and size with no concern for pHTN   - TTE on 2/25 at  with EF 61 and normal LVSF, but enlarged RV size with reduced RVSF, mild TR, mild PHTN with PASP 49, and dilated IVC to 3.4cn, but normal TAPSE 2.1.  - Continue on aggressive diuresis in ICU    - TTE 3/11 with RVSF reduced with TAPSE 1.6. IVC improved to 2.12cm.   - Continue on lasix 40 PO BID   - Monitor IO/BMP    RESPIRATORY  # ARDS second to MFPNA   - Hx of BEA on CPAP presented to  post URI with SOB and found with AHRF with progression to ARDS second to post viral MFPNA   - Intubated 2/25   - Cannulated for vvECMO 2/26   - s/p 60XLTCP tracheostomy 3/7   - Decannulated 3/7   - CTSx removed tracheostomy and ECMO sutures on 3/17   - Continue on TC QD with PMV as able with strong phonation  - Continue on PS 18/10/40 QHS given OHS   - Continue on nebs and HTS   -  trials continue and now trialing overnight with BIPAP for OHS (10/8/12)  - Decannulated 3/28   - Using nasal cannula 4L     GI  # Dysphagia   - s/p PEG 3/7   - Attempted green dye on 3/13 and failed  - Continue on TF  - Continue on miralax and senna  - RPT green dye passed 3/19  - 3/20 Passed FEEST - on soft bite sized with thin liquids Added consistent carb 2/2 weight/Elevated A1c     # Mild transaminitis   - LFTs elevated in ICU with TBILI elevated likely from ECMO hemolysis   - US ABD with hepatic steatosis   - Trend LFTs      RENAL  # ELLA   - ELLA likely from cardiorenal with RVE   - Diuresed in ICU and improved   - Monitor renal function and UOP   - Recurrent ELLA noted suspected in s/o vancomycin toxicity (4/7) now pending urine studies/UA and will attempt IV hydration     # Hypernatremia   - Hypernatremia with fever spikes   - Fever curve improved and attempting to wean FWF   - Continue on  Q6H (decreased 3/19)    INFECTIOUS DISEASE  # Recurrent fevers with fungemia from unknown source   - Recurrent fevers post ECMO decannulation thought initially to be cytokine surge   - BCx, SCx, UCx, RVP and MRSA RPT on 3/12 negative   - Completed zosyn (3/12-3/18) empirically with improved WBC , however recurrent fevers noted.   - RPT SCx, UA, CXR and RVP negative  - TTE 3/17 with no IE  - PanCT 3/17 with PNA and persistent panniculitis   - BCx 3/15 and 3/16 with candida albicans.   - Concern for possible source from panniculitis   - Continue on caspo (3/17 - ) and RPT BCx 3/18 negative  - ID following  - plan for 2 week course after negative BCx to 4/2  - 3/20 Bcx- NGTD  - 3/30 spiked temp cx's sent + ua and c/s pending Bcx neg to date - started on vanco /zosyn ID following  - Vanco stopped (4/7) and will c/w empiric Zosyn/Caspofungin for now   - 4/3 - persistent fevers, leukocytosis. Will repeat CT C/A/P to evaluate for source - no source found- Bilat lung opacities mildly decreased         # MFPNA and abdominal pannus cellulitis   - Presented to  post URI with SOB and found with AHRF with progression to ARDS second to post viral MFPNA   - RVP, legionella, strept, BAL, BCx, UCx, HIV, PCP, and adenovirus PCR negative   - Initially started on multiple agents in ICU and ultimatelty completed zosyn (2/26-3/9) ZMAX (2/25-2/26) and vanco (2/26-3/1)     # R/o Sacral OM  - Noted with hard indurated sacral wound by Wound Care Team  - CT Pelvis (3/29) revealing superficial skin thickening overlying the sacrum with underlying edema of the subcutaneous fat, in keeping with the clinical history of sacral wound. No evidence of underlying tract or drainable fluid collection.  - CT 4/3 - no fluid collection in sacral tissue   - Given ongoing fever with no clear source with obtain MRI Pelvis per ID recs to r/o sacral OM once renal fx improves    # Penile discharge   - GC/ chlamydia negative   - HIV negative   - Monitor for now    # PPX   - MRSA PCR positive and completed bactroban (2/26-3/3)     HEME   # Anemia likely second to ECMO circuit vs AOCD   - HH low in ICU second to ECMO circuit   - HH dropping again today however no bleeding noted   - Microcytic and hemochromic, sent anemia panel 3/19  - Trend HH     VASCULAR   # R IJ and BL LE DVTs   - Post ECMO dopplers on 3/9 negative for BL UE/ LE DVTs.   - Subsequent post ECMO dopplers performed on 3/14 and noted with acute, non-occlusive deep vein thrombosis noted within the right internal jugular vein. Acute, occlusive deep vein thromboses noted within the right and left peroneal veins at both mid calves, and age-indeterminate, occlusive deep vein thrombosis visualized within the left gastrocnemius vein at the proximal calf.     - Continue on argatroban GTT and change to eliquis     PODIATRY   # BL plantar feet blisters likely pressors induced  - Seen by podiatry and blisters lanced on 3/4 and again on 3/18  - Continue on local wound care per podiatry   - Podiatry following  - OK for WBAT will fu with PT for offloading shoe if appropriate  - Pain mgt consult   - Podiatry FU 3/28 / 4/3 done /following     ENDOCRINE  # Pre-DM2   - A1C 6.7  - Continue on ISS   - Monitor FS   - IF no demand then stop ISS     LINES/ TUBES  - R IJ and R FEM ECMO (2/26-3/7)     SKIN  # Pannus canndial intertrigo   # Sacrum/ buttock MAD  - Continue on clotrimazole cream   - WOC appreciated   -Seen by WC pt noted to have oozing from Buttock wound surgicel placed by wound care On follow up no further bleeding  - Wound care placed orders   - No bleeding from wound     ETHICS/ GOC    - FULL CODE     DISPO - PT following  Seen by PMR for acute rehab per recs

## 2025-04-09 NOTE — PROGRESS NOTE ADULT - SUBJECTIVE AND OBJECTIVE BOX
Infectious Diseases Follow Up:    Patient is a 39y old  Male who presents with a chief complaint of sob (02 Mar 2025 06:19)      Interval History/ROS:  No acute events noted, pt is afebrile, is doing well, denies any acute complaints     Allergies  No Known Allergies        ANTIMICROBIALS:  caspofungin IVPB 70 every 24 hours  piperacillin/tazobactam IVPB.. 3.375 every 8 hours      Current Abx:     Previous Abx     OTHER MEDS:  MEDICATIONS  (STANDING):  acetaminophen     Tablet .. 650 every 6 hours PRN  albuterol/ipratropium for Nebulization 3 every 6 hours PRN  amLODIPine   Tablet 10 daily  apixaban 5 every 12 hours  cyclobenzaprine 5 three times a day PRN  dextrose 50% Injectable 25 once  dextrose 50% Injectable 12.5 once  dextrose 50% Injectable 25 once  dextrose Oral Gel 15 once PRN  DULoxetine 30 daily  gabapentin 800 every 8 hours  glucagon  Injectable 1 once  HYDROmorphone  Injectable 0.5 every 6 hours PRN  insulin lispro (ADMELOG) corrective regimen sliding scale  three times a day before meals  insulin lispro (ADMELOG) corrective regimen sliding scale  at bedtime  LORazepam   Injectable 1 once  melatonin 3 at bedtime  pantoprazole    Tablet 40 before breakfast  polyethylene glycol 3350 17 daily  QUEtiapine 25 at bedtime  senna 2 at bedtime      Vital Signs Last 24 Hrs  T(C): 36.9 (09 Apr 2025 05:17), Max: 36.9 (08 Apr 2025 10:30)  T(F): 98.4 (09 Apr 2025 05:17), Max: 98.5 (08 Apr 2025 10:30)  HR: 108 (09 Apr 2025 06:56) (98 - 115)  BP: 152/81 (09 Apr 2025 05:17) (126/66 - 152/81)  BP(mean): --  RR: 14 (09 Apr 2025 05:17) (14 - 19)  SpO2: 100% (09 Apr 2025 06:56) (97% - 100%)    Parameters below as of 09 Apr 2025 07:15  Patient On (Oxygen Delivery Method): nasal cannula        PHYSICAL EXAM:  GENERAL: NAD  HEAD:  Atraumatic, Normocephalic  EYES: EOMI, conjunctiva and sclera clear  CHEST/LUNG: On NC, CTAB  HEART: RRR  ABDOMEN: Soft, Nontender, Nondistended;  Extremities: B/l feet wrapped   PSYCH: AAOx3                          8.0    13.91 )-----------( 422      ( 08 Apr 2025 06:10 )             24.9       04-09    137  |  94[L]  |  13  ----------------------------<  81  3.9   |  32[H]  |  1.89[H]    Ca    9.3      09 Apr 2025 05:25  Phos  6.2     04-09  Mg     1.80     04-09        Urinalysis Basic - ( 09 Apr 2025 05:25 )    Color: x / Appearance: x / SG: x / pH: x  Gluc: 81 mg/dL / Ketone: x  / Bili: x / Urobili: x   Blood: x / Protein: x / Nitrite: x   Leuk Esterase: x / RBC: x / WBC x   Sq Epi: x / Non Sq Epi: x / Bacteria: x        MICROBIOLOGY:  v  Blood Blood-Venous  04-07-25   No growth at 48 Hours  --  --      Blood Blood-Peripheral  04-07-25   No growth at 48 Hours  --  --      Clean Catch Clean Catch (Midstream)  03-31-25   <10,000 CFU/mL Normal Urogenital Kyle  --  --      Blood Blood-Peripheral  03-30-25   No growth at 5 days  --  --      Trach Asp Tracheal Aspirate  03-21-25   Commensal kyle consistent with body site  --    Few polymorphonuclear leukocytes per low power field  No Squamous epithelial cells per low power field  No organisms seen per oil power field      Blood Blood-Peripheral  03-20-25   No growth at 5 days  --  --      Blood Blood-Venous  03-20-25   No growth at 5 days  --  --      Blood Blood-Peripheral  03-18-25   No growth at 5 days  --  Blood Culture PCR  Candida albicans      Nares/Axilla/Groin Nares/Axilla/Groin  03-17-25   Culture NEGATIVE for Candida auris.  This surveillance culture is intended for Infection Control purposes only.  A negative result does not preclude the carriage of other fungal  organisms.  --  --      Blood Blood-Venous  03-16-25   Growth in aerobic bottle: Candida albicans  See previous culture [id number]  --    Growth in aerobic bottle: Yeast like cells      Blood Blood-Peripheral  03-16-25   Growth in aerobic bottle: Candida albicans  See previous culture [id number]  --    Growth in aerobic bottle: Yeast like cells      Trach Asp Tracheal Aspirate  03-16-25   Commensal kyle consistent with body site  --    Few polymorphonuclear leukocytes per low power field  Rare Squamous epithelial cells per low power field  Rare Gram Positive Rods seen per oil power field  Rare Gram Negative Rods seen per oil power field      Blood Blood-Venous  03-15-25   Growth in aerobic bottle: Candida albicans  See previous culture [id number]  --    Growth in aerobic bottle: Yeast like cells      Blood Blood-Peripheral  03-15-25   Growth in aerobic bottle: Candida albicans  Direct identification is available within approximately 3-5  hours either by Blood Panel Multiplexed PCR or Direct  MALDI-TOF. Details: [email or web address]  --  Blood Culture PCR  Candida albicans      Catheterized Catheterized  03-12-25   <10,000 CFU/mL Normal Urogenital Kyle  --  --      Blood Blood-Peripheral  03-11-25   No growth at 5 days  --  --      Blood Blood-Peripheral  03-11-25   No growth at 5 days  --  --      Trach Asp Tracheal Aspirate  03-10-25   Commensal kyle consistent with body site  --    Few polymorphonuclear leukocytes per low power field  No Squamous epithelial cells per low power field  No organisms seen per oil power field                RADIOLOGY:  < from: NM Inflammatory Loc Wholebody WBC, Single Day (04.08.25 @ 15:12) >  IMPRESSION:  1. Large area of soft tissue infection in the right side of the pelvis as   detailed above. Slightly more intense activity in the right iliac bone   compared to the left raises possibility of osteomyelitis.    2. Intense activity in the lateral aspect of the right forefoot   compatible with soft tissue infection; complicating osteomyelitis is not   excluded. Milder activity in the medial aspect of the left forefoot may   reflect soft tissue infection. Please correlate findings with clinical   examination.    3.No evidence of pneumonia.    < end of copied text >

## 2025-04-10 LAB
ANION GAP SERPL CALC-SCNC: 16 MMOL/L — HIGH (ref 7–14)
BASOPHILS # BLD AUTO: 0 K/UL — SIGNIFICANT CHANGE UP (ref 0–0.2)
BASOPHILS NFR BLD AUTO: 0 % — SIGNIFICANT CHANGE UP (ref 0–2)
BUN SERPL-MCNC: 13 MG/DL — SIGNIFICANT CHANGE UP (ref 7–23)
CALCIUM SERPL-MCNC: 9.1 MG/DL — SIGNIFICANT CHANGE UP (ref 8.4–10.5)
CHLORIDE SERPL-SCNC: 93 MMOL/L — LOW (ref 98–107)
CO2 SERPL-SCNC: 26 MMOL/L — SIGNIFICANT CHANGE UP (ref 22–31)
CREAT SERPL-MCNC: 1.74 MG/DL — HIGH (ref 0.5–1.3)
EGFR: 51 ML/MIN/1.73M2 — LOW
EGFR: 51 ML/MIN/1.73M2 — LOW
EOSINOPHIL # BLD AUTO: 0.1 K/UL — SIGNIFICANT CHANGE UP (ref 0–0.5)
EOSINOPHIL NFR BLD AUTO: 0.9 % — SIGNIFICANT CHANGE UP (ref 0–6)
GLUCOSE BLDC GLUCOMTR-MCNC: 100 MG/DL — HIGH (ref 70–99)
GLUCOSE BLDC GLUCOMTR-MCNC: 101 MG/DL — HIGH (ref 70–99)
GLUCOSE BLDC GLUCOMTR-MCNC: 106 MG/DL — HIGH (ref 70–99)
GLUCOSE BLDC GLUCOMTR-MCNC: 107 MG/DL — HIGH (ref 70–99)
GLUCOSE SERPL-MCNC: 92 MG/DL — SIGNIFICANT CHANGE UP (ref 70–99)
HCT VFR BLD CALC: 27 % — LOW (ref 39–50)
HGB BLD-MCNC: 7.9 G/DL — LOW (ref 13–17)
IANC: 8.33 K/UL — HIGH (ref 1.8–7.4)
LYMPHOCYTES # BLD AUTO: 0.1 K/UL — LOW (ref 1–3.3)
LYMPHOCYTES # BLD AUTO: 0.9 % — LOW (ref 13–44)
MAGNESIUM SERPL-MCNC: 1.9 MG/DL — SIGNIFICANT CHANGE UP (ref 1.6–2.6)
MCHC RBC-ENTMCNC: 24.9 PG — LOW (ref 27–34)
MCHC RBC-ENTMCNC: 29.3 G/DL — LOW (ref 32–36)
MCV RBC AUTO: 85.2 FL — SIGNIFICANT CHANGE UP (ref 80–100)
MONOCYTES # BLD AUTO: 1.08 K/UL — HIGH (ref 0–0.9)
MONOCYTES NFR BLD AUTO: 9.8 % — SIGNIFICANT CHANGE UP (ref 2–14)
NEUTROPHILS # BLD AUTO: 9.07 K/UL — HIGH (ref 1.8–7.4)
NEUTROPHILS NFR BLD AUTO: 82.1 % — HIGH (ref 43–77)
PHOSPHATE SERPL-MCNC: 6.3 MG/DL — HIGH (ref 2.5–4.5)
PLATELET # BLD AUTO: 173 K/UL — SIGNIFICANT CHANGE UP (ref 150–400)
POTASSIUM SERPL-MCNC: 4.7 MMOL/L — SIGNIFICANT CHANGE UP (ref 3.5–5.3)
POTASSIUM SERPL-SCNC: 4.7 MMOL/L — SIGNIFICANT CHANGE UP (ref 3.5–5.3)
RBC # BLD: 3.17 M/UL — LOW (ref 4.2–5.8)
RBC # FLD: 22.1 % — HIGH (ref 10.3–14.5)
SODIUM SERPL-SCNC: 135 MMOL/L — SIGNIFICANT CHANGE UP (ref 135–145)
WBC # BLD: 11.05 K/UL — HIGH (ref 3.8–10.5)
WBC # FLD AUTO: 11.05 K/UL — HIGH (ref 3.8–10.5)

## 2025-04-10 PROCEDURE — 99232 SBSQ HOSP IP/OBS MODERATE 35: CPT

## 2025-04-10 PROCEDURE — 99233 SBSQ HOSP IP/OBS HIGH 50: CPT

## 2025-04-10 PROCEDURE — G0545: CPT

## 2025-04-10 RX ORDER — HYDROMORPHONE/SOD CHLOR,ISO/PF 2 MG/10 ML
0.5 SYRINGE (ML) INJECTION EVERY 6 HOURS
Refills: 0 | Status: DISCONTINUED | OUTPATIENT
Start: 2025-04-10 | End: 2025-04-16

## 2025-04-10 RX ORDER — APIXABAN 2.5 MG/1
5 TABLET, FILM COATED ORAL
Refills: 0 | Status: COMPLETED | OUTPATIENT
Start: 2025-04-10 | End: 2025-04-12

## 2025-04-10 RX ORDER — SODIUM CHLORIDE 9 G/1000ML
1000 INJECTION, SOLUTION INTRAVENOUS
Refills: 0 | Status: DISCONTINUED | OUTPATIENT
Start: 2025-04-10 | End: 2025-04-13

## 2025-04-10 RX ORDER — SEVELAMER HYDROCHLORIDE 800 MG/1
800 TABLET ORAL
Refills: 0 | Status: DISCONTINUED | OUTPATIENT
Start: 2025-04-10 | End: 2025-04-25

## 2025-04-10 RX ADMIN — APIXABAN 5 MILLIGRAM(S): 2.5 TABLET, FILM COATED ORAL at 17:33

## 2025-04-10 RX ADMIN — MUPIROCIN CALCIUM 1 APPLICATION(S): 20 CREAM TOPICAL at 17:39

## 2025-04-10 RX ADMIN — SODIUM CHLORIDE 100 MILLILITER(S): 9 INJECTION, SOLUTION INTRAVENOUS at 11:30

## 2025-04-10 RX ADMIN — Medication 650 MILLIGRAM(S): at 18:38

## 2025-04-10 RX ADMIN — Medication 1 APPLICATION(S): at 06:32

## 2025-04-10 RX ADMIN — AMLODIPINE BESYLATE 10 MILLIGRAM(S): 10 TABLET ORAL at 06:29

## 2025-04-10 RX ADMIN — LIDOCAINE HYDROCHLORIDE 1 PATCH: 20 JELLY TOPICAL at 11:23

## 2025-04-10 RX ADMIN — GABAPENTIN 800 MILLIGRAM(S): 400 CAPSULE ORAL at 21:26

## 2025-04-10 RX ADMIN — Medication 0.5 MILLIGRAM(S): at 10:06

## 2025-04-10 RX ADMIN — Medication 0.5 MILLIGRAM(S): at 21:31

## 2025-04-10 RX ADMIN — Medication 25 GRAM(S): at 13:39

## 2025-04-10 RX ADMIN — POLYETHYLENE GLYCOL 3350 17 GRAM(S): 17 POWDER, FOR SOLUTION ORAL at 11:22

## 2025-04-10 RX ADMIN — SODIUM HYPOCHLORITE 1 APPLICATION(S): 0.12 SOLUTION TOPICAL at 06:29

## 2025-04-10 RX ADMIN — Medication 1 TABLET(S): at 11:24

## 2025-04-10 RX ADMIN — CASPOFUNGIN ACETATE 260 MILLIGRAM(S): 5 INJECTION, POWDER, LYOPHILIZED, FOR SOLUTION INTRAVENOUS at 06:28

## 2025-04-10 RX ADMIN — CLOTRIMAZOLE 1 APPLICATION(S): 1 CREAM TOPICAL at 17:40

## 2025-04-10 RX ADMIN — LIDOCAINE HYDROCHLORIDE 1 PATCH: 20 JELLY TOPICAL at 11:22

## 2025-04-10 RX ADMIN — CLOTRIMAZOLE 1 APPLICATION(S): 1 CREAM TOPICAL at 06:29

## 2025-04-10 RX ADMIN — DULOXETINE 30 MILLIGRAM(S): 20 CAPSULE, DELAYED RELEASE ORAL at 11:24

## 2025-04-10 RX ADMIN — Medication 0.5 MILLIGRAM(S): at 16:10

## 2025-04-10 RX ADMIN — Medication 650 MILLIGRAM(S): at 11:26

## 2025-04-10 RX ADMIN — Medication 25 GRAM(S): at 21:27

## 2025-04-10 RX ADMIN — Medication 25 GRAM(S): at 09:03

## 2025-04-10 RX ADMIN — QUETIAPINE FUMARATE 25 MILLIGRAM(S): 25 TABLET ORAL at 21:26

## 2025-04-10 RX ADMIN — SODIUM HYPOCHLORITE 1 APPLICATION(S): 0.12 SOLUTION TOPICAL at 17:40

## 2025-04-10 RX ADMIN — Medication 40 MILLIGRAM(S): at 06:28

## 2025-04-10 RX ADMIN — MUPIROCIN CALCIUM 1 APPLICATION(S): 20 CREAM TOPICAL at 06:30

## 2025-04-10 RX ADMIN — APIXABAN 5 MILLIGRAM(S): 2.5 TABLET, FILM COATED ORAL at 06:29

## 2025-04-10 RX ADMIN — Medication 0.5 MILLIGRAM(S): at 15:10

## 2025-04-10 RX ADMIN — Medication 0.5 MILLIGRAM(S): at 09:06

## 2025-04-10 RX ADMIN — SEVELAMER HYDROCHLORIDE 800 MILLIGRAM(S): 800 TABLET ORAL at 13:41

## 2025-04-10 RX ADMIN — Medication 650 MILLIGRAM(S): at 12:26

## 2025-04-10 RX ADMIN — Medication 3 MILLIGRAM(S): at 21:26

## 2025-04-10 RX ADMIN — GABAPENTIN 800 MILLIGRAM(S): 400 CAPSULE ORAL at 13:38

## 2025-04-10 RX ADMIN — SEVELAMER HYDROCHLORIDE 800 MILLIGRAM(S): 800 TABLET ORAL at 17:33

## 2025-04-10 RX ADMIN — GABAPENTIN 800 MILLIGRAM(S): 400 CAPSULE ORAL at 06:26

## 2025-04-10 RX ADMIN — Medication 650 MILLIGRAM(S): at 17:38

## 2025-04-10 NOTE — PROGRESS NOTE ADULT - ASSESSMENT
Assessment/Plan: 40 YO M with PMHx of morbid obesity, BEA on CPAP, pAFIB (not on AC), HTN, and BL papilledema attributed to pseudotumor cerebri who presented initially to [facility] on 2/24 with SOB and BL LE edema. Found with URI outpatient with progressive SOB, and concern for noted for MFPNA on imaging. Course progressed with AHRF with worsening O2 demand, respiratory distress, secretions, and somnolence requiring intubation (difficult with success after 6 attempts) in ED and admission to [facility] MICU. While in MICU, patient noted with increasing O2 demand with no improvement despite optimal vent management. Concern noted for progressive ARDS, unable to prone given morbid obesity, and ultimately cannulated for vvECMO on 2/25 and transferred to [facility] for further management on 2/26. While at [facility], tx with diuresis and ABX, and ultimately decannulated and s/p 60XLTCP trach and PEG on 3/7. Patient ultimately transferred to [facility] on 3/11 and course complicated by recurrent high grade fevers and found with fungemia.     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  -Risk of intertriginous dermatitis to remaining intertriginous folds  -Peritubular skin management of PEG     Plan:  -Per records initially with deep tissue pressure injury --> evolved to unstageable pressure --> now evolved to stage 4 pressure injury, remains with increased moisture secondary to body habitus, fecal and urinary incontinence, history of critical illness (mechanical ventilation > 72 hours, sepsis), and limited mobility.   -Treated with Dakins 1/4 strength.  -Tissue type predominantly stable, remaining adherent slough softening, deep tunnels at 11 o'clock and 12 - 1 o'clock with copious seropurulent drainage and undermining at 3 o'clock.  -Right buttock area of nonfluctuant induration seems to be slightly improving, maybe due to spontaneously draining tunnel?  -Unsure if tunnel extends to bone, can not palpable where tunnel terminates, but is in direction of iliac crest; consistent with findings of WBC scan  -Unable to perform bedside debridement- viable base friable, along with body habitus limiting ability to safely debride slough deep within wound bed, risk of bleeding outweighs benefit   -MRI lumbar spine- with no mention of sacral ulcer- likely unable to see given area of focus.  Would recommend repeat MRI with extended cuts to include sacrum and bilateral buttocks r/o deep abscess. Due to body habitus difficult to identify whether or not there is a drainable deep abscess, with findings from WBC scan, repeat MRI would likely be beneficial.  -Patient seen with ID Dr. Owens at bedside yesterday 4/9/2025, no culture taken, area of tunnel within necrotic base; unable to isolate viable area to obtain culture.  -Per ID will continue with Abx to treat OM  -Will continue topical recommendations and monitor closely/will follow up tomorrow.  -Topical recommendations:   ·	Sacral/gluteal cleft to bilateral buttocks- cleanse wound and satellite ulcerations to b/l buttocks with NS, Dry well. Apply Liquid barrier film to periwound skin. Apply hydrocolloid to left buttock shallow ulceration, massage to facilitate adhesion of dressing- if dressing is not soiled may change every 3 days. Apply Aquacel hydrofiber sheet to right buttock, pack sacral/gluteal cleft including deep tunnels at 11 o'clock and 12- 1 o'clock with Dakins 1/4 strength moistened 2" kerlix, leave at least 2" out at end to ensure full removal of packing with subsequent dressing changes. Cover entire area (sacrum and right buttock) with abdominal pad and tegaderm. Change twice a day and prn if soiled/compromised. Once dressing is in place and perineal care is provided, apply TRIAD moisture barrier cream to exposed skin every shift and prn.  ·	Peritubular Skin of PEG: Cleanse q shift with NS, apply liquid barrier film beneath matt disc.  Apply thin foam  dressing without border (Mepilex Lite)- cut to mid dressing with a 'Y' shape. Secondary securement to abdomen taping in 'H' fashion with Steri-strips.  ·	Bilateral abdominal pannus, bilateral groin: Cleanse with luke-warm soap and water, dry well. Apply Interdry textile sheeting, under intertriginous folds leaving 2 inches exposed at ends to wick, remove to wash & dry affected area, then replace. Individual sheeting may be used for up to 5 days unless soiled. Inspect skin daily.   -Continue to offload pressure; bariatric low airloss support surface, turn and position per protocol with use of fluidized positioning devices, continue incontinence and moisture care per protocol and use of single breathable incontinence pads, continue to offload heels with fluidized positioning devices/Ochoa-Lock positioning device (PS# [id number]). Continue use of bariatric seat cushion (people soft #[id number]) and limit sit time- no more than 2 consecutive hours at any given time.   -Continue Nutritional management as per RD recommendations.  -B/l feet management per Podiatry surgery   -Appreciate PT and safe patient handling    Upon discharge follow up at outpatient [facility] [place]. [address] [facility]. [phone].    Patient seen with Dr. Aguilar today.  Findings and plan discussed with patient, patient's father at bedside, and primary team MD Dr. Siddiqui.   All questions and concerns addressed to meet patient's satisfaction.  Will continue to follow periodically while inpatient, please reconsult earlier as needed.  Remainder of care per primary team.  Thank you.    EDOUARD Driscoll, CWN   MS TEAMS    If after 4PM or before 7:30AM on Mon-Friday or weekend/holiday please contact general surgery for urgent matters.   Team A- [id number]   Team B- [id number]  For non-urgent matters e-mail [email or web address]    I spent 50 minutes face-to-face with this patient of which more than 50% of the time was spent counseling/coordinating care of this patient.     Assessment/Plan: 40 YO M with PMHx of morbid obesity, BEA on CPAP, pAFIB (not on AC), HTN, and BL papilledema attributed to pseudotumor cerebri who presented initially to [facility] on 2/24 with SOB and BL LE edema. Found with URI outpatient with progressive SOB, and concern for noted for MFPNA on imaging. Course progressed with AHRF with worsening O2 demand, respiratory distress, secretions, and somnolence requiring intubation (difficult with success after 6 attempts) in ED and admission to [facility] MICU. While in MICU, patient noted with increasing O2 demand with no improvement despite optimal vent management. Concern noted for progressive ARDS, unable to prone given morbid obesity, and ultimately cannulated for vvECMO on 2/25 and transferred to [facility] for further management on 2/26. While at [facility], tx with diuresis and ABX, and ultimately decannulated and s/p 60XLTCP trach and PEG on 3/7. Patient ultimately transferred to [facility] on 3/11 and course complicated by recurrent high grade fevers and found with fungemia.     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  -Risk of intertriginous dermatitis to remaining intertriginous folds  -Peritubular skin management of PEG     Plan:  -Per records initially with deep tissue pressure injury --> evolved to unstageable pressure --> now evolved to stage 4 pressure injury, remains with increased moisture secondary to body habitus, fecal and urinary incontinence, history of critical illness (mechanical ventilation > 72 hours, sepsis), and limited mobility.   -Treated with Dakins 1/4 strength.  -Tissue type predominantly stable, remaining adherent slough softening, deep tunnels at 11 o'clock and 12 - 1 o'clock with copious seropurulent drainage and undermining at 3 o'clock.  -Right buttock area of nonfluctuant induration seems to be slightly improving, maybe due to spontaneously draining tunnel?  -Unsure if tunnel extends to bone, can not palpable where tunnel terminates, but is in direction of iliac crest; consistent with findings of WBC scan  -Unable to perform bedside debridement- viable base friable, along with body habitus limiting ability to safely debride slough deep within wound bed, risk of bleeding outweighs benefit   -MRI lumbar spine- with no mention of sacral ulcer- likely unable to see given area of focus.  Would recommend repeat MRI with extended cuts to include sacrum and bilateral buttocks r/o deep abscess. Due to body habitus difficult to identify whether or not there is a drainable deep abscess, with findings from WBC scan, repeat MRI would likely be beneficial.  -Patient seen with ID Dr. Owens at bedside yesterday 4/9/2025, no culture taken, area of tunnel within necrotic base; unable to isolate viable area to obtain culture.  -Per ID will continue with Abx to treat OM  -Will continue topical recommendations and monitor closely/will follow up tomorrow.  -Topical recommendations:   ·	Sacral/gluteal cleft to bilateral buttocks- cleanse wound and satellite ulcerations to b/l buttocks with NS, Dry well. Apply Liquid barrier film to periwound skin. Apply hydrocolloid to left buttock shallow ulceration, massage to facilitate adhesion of dressing- if dressing is not soiled may change every 3 days. Apply Aquacel hydrofiber sheet to right buttock, pack sacral/gluteal cleft including deep tunnels at 11 o'clock and 12- 1 o'clock with Dakins 1/4 strength moistened 2" kerlix, leave at least 2" out at end to ensure full removal of packing with subsequent dressing changes. Cover entire area (sacrum and right buttock) with abdominal pad and tegaderm. Change twice a day and prn if soiled/compromised. Once dressing is in place and perineal care is provided, apply TRIAD moisture barrier cream to exposed skin every shift and prn.  ·	Peritubular Skin of PEG: Cleanse q shift with NS, apply liquid barrier film beneath matt disc.  Apply thin foam  dressing without border (Mepilex Lite)- cut to mid dressing with a 'Y' shape. Secondary securement to abdomen taping in 'H' fashion with Steri-strips.  ·	Bilateral abdominal pannus, bilateral groin: Cleanse with luke-warm soap and water, dry well. Apply Interdry textile sheeting, under intertriginous folds leaving 2 inches exposed at ends to wick, remove to wash & dry affected area, then replace. Individual sheeting may be used for up to 5 days unless soiled. Inspect skin daily.   -Continue to offload pressure; bariatric low airloss support surface, turn and position per protocol with use of fluidized positioning devices, continue incontinence and moisture care per protocol and use of single breathable incontinence pads, continue to offload heels with fluidized positioning devices/Ochoa-Lock positioning device (PS# [id number]). Continue use of bariatric seat cushion (people soft #[id number]) and limit sit time- no more than 2 consecutive hours at any given time.   -Continue Nutritional management as per RD recommendations.  -B/l feet management per Podiatry surgery   -Appreciate PT and safe patient handling    Upon discharge follow up at outpatient [facility] [place]. [address] [facility]. [phone].    Patient seen with Dr. Aguilar today.  Findings and plan discussed with patient, patient's father at bedside, and primary team MD Dr. Siddiqui.   All questions and concerns addressed to meet patient's satisfaction.  Will continue to follow periodically while inpatient, please reconsult earlier as needed.  Remainder of care per primary team.  Thank you.    EDOUARD Driscoll, CWN   MS TEAMS    If after 4PM or before 7:30AM on Mon-Friday or weekend/holiday please contact general surgery for urgent matters.   Team A- [id number]   Team B- [id number]  For non-urgent matters e-mail [email or web address]

## 2025-04-10 NOTE — PROGRESS NOTE ADULT - ASSESSMENT
40 YO M with PMHx of morbid obesity, BEA on CPAP, pAFIB (not on AC), HTN, and BL papilledema attributed to pseudotumor cerebri who presented initially to [facility] on 2/24 with SOB and BL LE edema. Found with URI outpatient with progressive SOB, and concern for noted for MFPNA on imaging. Course progressed with AHRF with worsening O2 demand, respiratory distress, secretions, and somnolence requiring intubation (difficult with success after 6 attempts) in ED and admission to [facility] MICU. While in MICU, patient noted with increasing O2 demand with no improvement despite optimal vent management. Concern noted for progressive ARDS, unable to prone given morbid obesity, and ultimately cannulated for vvECMO on 2/25 and transferred to [facility] for further management on 2/26. While at [facility], tx with diuresis and ABX, and ultimately decannulated and s/p 60XLTCP trach and PEG on 3/7. Patient ultimately transferred to RCU on 3/11 and course complicated by recurrent high grade fevers and found with fungemia.    NEUROLOGY  # Hx of Pseudotumor Cerebri   - s/p LP in 2024 with high opening pressure  - s/p MRI 2024 with possible pituitary mass but unclear because of pressure effect from pseudotumor cerebri causing partially empty sella.  - Prolactin elevated   - ACTH low but received steroids during admission   - FT4 low normal and TSH normal, but given FT4 low normal TSH should be higher   - Discuss endocrine consult for inhouse work up vs outpatient.     # Sedation   - Weaned off sedation in ICU   - Nimbex turned off 2/27   - Prop turned off 3/9   - Precedex turned off and catapres started 3/11  - catapres 0.2 TID (decreased 3/25) - tapered down 3/31 to 0.1 tid - discontinued 4/3     # Insomnia   - Patient worked night shift x 15 years   - Trouble sleeping at night leading to day time sleepiness and poor participation with PT  - Continue on Seroquel 25 QHS (increased 3/18)   - Continue on melatonin   - Continue on Neurontin as below    # BL LE neuropathy   - CIPN concern   - Neurontin increased to 300mg Q8H with relief   - added Percocet Q4H for moderate pain (3/25) and continue on Dilaudid Q6H for severe pain   - Pain mgt called to consult - recs for tylenol ATC, NEurontin increased to 400mg q6, Oxycodone 5 q4h prn , Dilaudid for BREAKTHROUGH pain only, Lidocaine patches on each leg, ice prn to feet  - Some improvement in pain - PT consult for TENS,   - oxycodone inc to 10mg as pt with no relief /buttocks area indurated /tender CT ordered -no abscess  - C/w Gabapentin : 600/600/800 and up titrate as required  - Switched to PO Dilaudid 2mg Q6H PRN moderate-severe and IV Dilaudid PRN severe breakthrough and with dressing changes   - Still with severe pain 10/10 despite change to Dilaudid will increase to 4mg q4h prn with iv Dilaudid breakthrough  - Neurontin increased to 800mg q8h Monitor neuro status/ lethargy   - I have reviewed with pt to not wait to long for dose of pain medication to  prevent pain from escalating   - Meds reviewed despite being in constant pain pt not taking Dilaudid as often as he can often with 10/10 pain Dilaudid will be every 4 hours with parameters and pt can refuse Will reconsult pain mgt again for any recommendations on Monday   - Pain management reconsulted , recs to stop Dilaudid PO ATC , continue Dilaudid for breakthrough pain with dressing changes, increase gabapentin to 900 mg Q8, add Cymbalta 30 mg QD and Flexeril 5mg Q8    CARDIOVASCULAR  # HTN   - HTN noted in ICU requiring cardene and esmolol GTTs   - Lisinopril dc'ed to increase catapres   - Continue on norvasc 10, catapres weaned off 4/3  - Monitor BP     # AFIB   - Hx of pAFIB   - No RVR episodes or pAFIB episodes in ICU   - No rate control medications needed  - Monitor on telemetry     # RV dilation second to PHTN   - POCUS on arrival to [facility] with RVE concern   - TTE 10/2024 with EF 55-60 with normal LVSF, moderate LVH and normal RVSF and size with no concern for pHTN   - TTE on 2/25 at  with EF 61 and normal LVSF, but enlarged RV size with reduced RVSF, mild TR, mild PHTN with PASP 49, and dilated IVC to 3.4cn, but normal TAPSE 2.1.  - Continue on aggressive diuresis in ICU    - TTE 3/11 with RVSF reduced with TAPSE 1.6. IVC improved to 2.12cm.   - Lasix 40 PO QD discontinued 4/7 secondary to ELLA  - Monitor IO/ BMP    RESPIRATORY  # ARDS second to MFPNA   - Hx of BEA on CPAP presented to  post URI with SOB and found with AHRF with progression to ARDS second to post viral MFPNA   - Intubated 2/25   - Cannulated for vvECMO 2/26   - s/p 60XLTCP tracheostomy 3/7   - Decannulated 3/7   - CTSx removed tracheostomy and ECMO sutures on 3/17   - Continue on TC QD with PMV as able with strong phonation  - Continue on PS 18/10/40 QHS given OHS   - Continue on nebs and HTS   -  trials continue and now trialing overnight with BIPAP for OHS (10/8/12)  - Decannulated 3/28   - Using nasal cannula 4L     GI  # Dysphagia   - s/p PEG 3/7   - Attempted green dye on 3/13 and failed  - Continue on TF  - Continue on miralax and senna  - RPT green dye passed 3/19  - 3/20 Passed FEEST - on soft bite sized with thin liquids Added consistent carb 2/2 weight/Elevated A1c   - plan for OR 4/15 for PEG removal with cardiothoracic surgery   - Hold Eliquis held for 72 hours last dose 4/12 AM      # Mild transaminitis   - LFTs elevated in ICU with TBILI elevated likely from ECMO hemolysis   - US ABD with hepatic steatosis   - Trend LFTs      RENAL  # ELLA   - ELLA likely from cardiorenal with RVE   - Diuresed in ICU and improved   - Monitor renal function and UOP   - Recurrent ELLA noted suspected in s/o vancomycin toxicity (4/7)   - UA negative and Urine Studies FeNa 0.9 % suggest pre-renal etiology  - Lasix discontinued for now  - s/p IV fluid trial on 4/8 with minimal improvement  - Trend BMP and I/Os    # Hypernatremia (resolved)  - Hypernatremia with fever spikes   - Fever curve improved and attempting to wean FWF   - now off FWF    INFECTIOUS DISEASE  # Recurrent fevers with fungemia from unknown source   - Recurrent fevers post ECMO decannulation thought initially to be cytokine surge   - BCx, SCx, UCx, RVP and MRSA RPT on 3/12 negative   - Completed zosyn (3/12-3/18) empirically with improved WBC , however recurrent fevers noted.   - RPT SCx, UA, CXR and RVP negative  - TTE 3/17 with no IE  - PanCT 3/17 with PNA and persistent panniculitis  - BCx 3/15 and 3/16 with candida albicans, rpt BCx 3/18 and 3/20 negative   - Concern for possible source from panniculitis   - Continue on Caspofungin 70 mg daily 3/17 - 4/15, ID consult appreciated   - 3/30 spiked temp cx's sent + ua and c/s pending Bcx neg to date - started on vanco /zosyn ID following  - Vanco stopped (4/7) due to ELLA concern for Vanco toxicty and continued on empiric Zosyn/ Caspofungin   - 4/3 - persistent fevers, leukocytosis. Will repeat CT C/A/P to evaluate for source - no source found - Bilat lung opacities mildly decreased     - 4/8 bone scan: : 1. Large area of soft tissue infection in the R side of the pelvis as detailed above. Slightly more intense activity in the R iliac bone compared to the L raises possibility of OM. 2. Intense activity in the lateral aspect of the R forefoot compatible with soft tissue infection; complicating osteomyelitis is not excluded. Milder activity in the medial aspect of the L forefoot may reflect soft tissue infection.   - ID consult appreciated. Continue on Zosyn 3.375 g 8, plan on 6 week course for possible OM until 5/11 and Caspofungin 70 mg daily, will plan on 4 week course ending 4/15  - Obtain MRI pelvis to eval for OM pending    # MFPNA and abdominal pannus cellulitis   - Presented to  post URI with SOB and found with AHRF with progression to ARDS second to post viral MFPNA   - RVP, legionella, strept, BAL, BCx, UCx, HIV, PCP, and adenovirus PCR negative   - Initially started on multiple agents in ICU and ultimately completed zosyn (2/26-3/9) ZMAX (2/25-2/26) and vanco (2/26-3/1)     # R/o Sacral OM  - Noted with hard indurated sacral wound by Wound Care Team  - CT Pelvis (3/29) revealing superficial skin thickening overlying the sacrum with underlying edema of the subcutaneous fat, in keeping with the clinical history of sacral wound. No evidence of underlying tract or drainable fluid collection.  - CT 4/3 - no fluid collection in sacral tissue   - Given ongoing fever with no clear source with obtain MRI Pelvis per ID recs to r/o sacral OM once renal fx improves  - MR pelvis pending, antibiotics as above     # Penile discharge   - GC/ chlamydia negative   - HIV negative   - Monitor for now    # PPX   - MRSA PCR positive and completed bactroban (2/26-3/3)     HEME   # Anemia likely second to ECMO circuit vs AOCD   - HH low in ICU second to ECMO circuit   - HH dropping again today however no bleeding noted   - Microcytic and hemochromic, sent anemia panel 3/19  - Trend HH     VASCULAR   # R IJ and BL LE DVTs   - Post ECMO dopplers on 3/9 negative for BL UE/ LE DVTs.   - Subsequent post ECMO dopplers performed on 3/14 and noted with acute, non-occlusive deep vein thrombosis noted within the right internal jugular vein. Acute, occlusive deep vein thromboses noted within the right and left peroneal veins at both mid calves, and age-indeterminate, occlusive deep vein thrombosis visualized within the left gastrocnemius vein at the proximal calf.     - Continue on argatroban GTT and changed to Eliquis     PODIATRY   # BL plantar feet blisters likely pressors induced  - Seen by podiatry and blisters lanced on 3/4 and again on 3/18  - Continue on local wound care per podiatry   - Podiatry following  - OK for WBAT will fu with PT for offloading shoe if appropriate  - Pain mgt consult   - Podiatry FU 3/28 / 4/3 done /following     ENDOCRINE  # Pre-DM2   - A1C 6.7  - Continue on ISS   - Monitor FS   - IF no demand then stop ISS     LINES/ TUBES  - R IJ and R FEM ECMO (2/26-3/7)     SKIN  # Pannus canndial intertrigo   # Sacrum/ buttock MAD  - Continue on clotrimazole cream   - WOC appreciated   -Seen by WC pt noted to have oozing from Buttock wound surgicel placed by wound care On follow up no further bleeding  - Wound care placed orders   - No bleeding from wound     ETHICS/ GOC    - FULL CODE     DISPO - PT following  Seen by PMR for acute rehab per recs   38 YO M with PMHx of morbid obesity, BEA on CPAP, pAFIB (not on AC), HTN, and BL papilledema attributed to pseudotumor cerebri who presented initially to [facility] on 2/24 with SOB and BL LE edema. Found with URI outpatient with progressive SOB, and concern for noted for MFPNA on imaging. Course progressed with AHRF with worsening O2 demand, respiratory distress, secretions, and somnolence requiring intubation (difficult with success after 6 attempts) in ED and admission to [facility] MICU. While in MICU, patient noted with increasing O2 demand with no improvement despite optimal vent management. Concern noted for progressive ARDS, unable to prone given morbid obesity, and ultimately cannulated for vvECMO on 2/25 and transferred to [facility] for further management on 2/26. While at [facility], tx with diuresis and ABX, and ultimately decannulated and s/p 60XLTCP trach and PEG on 3/7. Patient ultimately transferred to [facility] on 3/11 and course complicated by recurrent high grade fevers and found with fungemia.    NEUROLOGY  # Hx of Pseudotumor Cerebri   - s/p LP in 2024 with high opening pressure  - s/p MRI 2024 with possible pituitary mass but unclear because of pressure effect from pseudotumor cerebri causing partially empty sella.  - Prolactin elevated   - ACTH low but received steroids during admission   - FT4 low normal and TSH normal, but given FT4 low normal TSH should be higher   - Discuss endocrine consult for inhouse work up vs outpatient.     # Sedation   - Weaned off sedation in ICU   - Nimbex turned off 2/27   - Prop turned off 3/9   - Precedex turned off and catapres started 3/11  - catapres 0.2 TID (decreased 3/25) - tapered down 3/31 to 0.1 tid - discontinued 4/3     # Insomnia   - Patient worked night shift x 15 years   - Trouble sleeping at night leading to day time sleepiness and poor participation with PT  - Continue on Seroquel 25 QHS (increased 3/18)   - Continue on melatonin   - Continue on Neurontin as below    # BL LE neuropathy   - CIPN concern   - Neurontin increased to 300mg Q8H with relief   - added Percocet Q4H for moderate pain (3/25) and continue on Dilaudid Q6H for severe pain   - Pain mgt called to consult - recs for tylenol ATC, NEurontin increased to 400mg q6, Oxycodone 5 q4h prn , Dilaudid for BREAKTHROUGH pain only, Lidocaine patches on each leg, ice prn to feet  - Some improvement in pain - PT consult for TENS,   - oxycodone inc to 10mg as pt with no relief /buttocks area indurated /tender CT ordered -no abscess  - C/w Gabapentin : 600/600/800 and up titrate as required  - Switched to PO Dilaudid 2mg Q6H PRN moderate-severe and IV Dilaudid PRN severe breakthrough and with dressing changes   - Still with severe pain 10/10 despite change to Dilaudid will increase to 4mg q4h prn with iv Dilaudid breakthrough  - Neurontin increased to 800mg q8h Monitor neuro status/ lethargy   - I have reviewed with pt to not wait to long for dose of pain medication to  prevent pain from escalating   - Meds reviewed despite being in constant pain pt not taking Dilaudid as often as he can often with 10/10 pain Dilaudid will be every 4 hours with parameters and pt can refuse Will reconsult pain mgt again for any recommendations on Monday   - Pain management reconsulted , recs to stop Dilaudid PO ATC , continue Dilaudid for breakthrough pain with dressing changes, increase gabapentin to 900 mg Q8, add Cymbalta 30 mg QD and Flexeril 5mg Q8    CARDIOVASCULAR  # HTN   - HTN noted in ICU requiring cardene and esmolol GTTs   - Lisinopril dc'ed to increase catapres   - Continue on norvasc 10, catapres weaned off 4/3  - Monitor BP     # AFIB   - Hx of pAFIB   - No RVR episodes or pAFIB episodes in ICU   - No rate control medications needed  - Monitor on telemetry     # RV dilation second to PHTN   - POCUS on arrival to [facility] with RVE concern   - TTE 10/2024 with EF 55-60 with normal LVSF, moderate LVH and normal RVSF and size with no concern for pHTN   - TTE on 2/25 at  with EF 61 and normal LVSF, but enlarged RV size with reduced RVSF, mild TR, mild PHTN with PASP 49, and dilated IVC to 3.4cn, but normal TAPSE 2.1.  - Continue on aggressive diuresis in ICU    - TTE 3/11 with RVSF reduced with TAPSE 1.6. IVC improved to 2.12cm.   - Lasix 40 PO QD discontinued 4/7 secondary to ELLA  - Monitor IO/ BMP    RESPIRATORY  # ARDS second to MFPNA   - Hx of BEA on CPAP presented to  post URI with SOB and found with AHRF with progression to ARDS second to post viral MFPNA   - Intubated 2/25   - Cannulated for vvECMO 2/26   - s/p 60XLTCP tracheostomy 3/7   - Decannulated 3/7   - CTSx removed tracheostomy and ECMO sutures on 3/17   - Continue on TC QD with PMV as able with strong phonation  - Continue on PS 18/10/40 QHS given OHS   - Continue on nebs and HTS   -  trials continue and now trialing overnight with BIPAP for OHS (10/8/12)  - Decannulated 3/28   - Using nasal cannula 4L     GI  # Dysphagia   - s/p PEG 3/7   - Attempted green dye on 3/13 and failed  - Continue on TF  - Continue on miralax and senna  - RPT green dye passed 3/19  - 3/20 Passed FEEST - on soft bite sized with thin liquids Added consistent carb 2/2 weight/Elevated A1c   - plan for OR 4/15 for PEG removal with cardiothoracic surgery   - Hold Eliquis held for 72 hours last dose 4/12 AM - will begin Heparin drip on Saturday to bridge to procedure       # Mild transaminitis   - LFTs elevated in ICU with TBILI elevated likely from ECMO hemolysis   - US ABD with hepatic steatosis   - Trend LFTs      RENAL  # ELLA   - ELLA likely from cardiorenal with RVE   - Diuresed in ICU and improved   - Monitor renal function and UOP   - Recurrent ELLA noted suspected in s/o vancomycin toxicity (4/7)   - UA negative and Urine Studies FeNa 0.9 % suggest pre-renal etiology  - Lasix discontinued for now  - s/p IV fluid trial on 4/8 with minimal improvement  - Trialing additional fluids on 4/10   - Trend BMP and I/Os    # Hypernatremia (resolved)  - Hypernatremia with fever spikes   - Fever curve improved and attempting to wean FWF   - now off FWF    INFECTIOUS DISEASE  # Recurrent fevers with fungemia from unknown source   - Recurrent fevers post ECMO decannulation thought initially to be cytokine surge   - BCx, SCx, UCx, RVP and MRSA RPT on 3/12 negative   - Completed zosyn (3/12-3/18) empirically with improved WBC , however recurrent fevers noted.   - RPT SCx, UA, CXR and RVP negative  - TTE 3/17 with no IE  - PanCT 3/17 with PNA and persistent panniculitis  - BCx 3/15 and 3/16 with candida albicans, rpt BCx 3/18 and 3/20 negative   - Concern for possible source from panniculitis   - Continue on Caspofungin 70 mg daily 3/17 - 4/15, ID consult appreciated   - 3/30 spiked temp cx's sent + ua and c/s pending Bcx neg to date - started on vanco /zosyn ID following  - Vanco stopped (4/7) due to ELLA concern for Vanco toxicty and continued on empiric Zosyn/ Caspofungin   - 4/3 - persistent fevers, leukocytosis. Will repeat CT C/A/P to evaluate for source - no source found - Bilat lung opacities mildly decreased     - 4/8 bone scan: : "1. Large area of soft tissue infection in the R side of the pelvis as detailed above. Slightly more intense activity in the R iliac bone compared to the L raises possibility of OM. 2. Intense activity in the lateral aspect of the R forefoot compatible with soft tissue infection; complicating osteomyelitis is not excluded. Milder activity in the medial aspect of the L forefoot may reflect soft tissue infection."  - ID consult appreciated. Continue on Zosyn 3.375 g 8, plan on 6 week course for possible OM until 5/11 and Caspofungin 70 mg daily, will plan on 4 week course ending 4/15  - Obtain MRI pelvis to eval for OM pending    # MFPNA and abdominal pannus cellulitis   - Presented to  post URI with SOB and found with AHRF with progression to ARDS second to post viral MFPNA   - RVP, legionella, strept, BAL, BCx, UCx, HIV, PCP, and adenovirus PCR negative   - Initially started on multiple agents in ICU and ultimately completed zosyn (2/26-3/9) ZMAX (2/25-2/26) and vanco (2/26-3/1)     # R/o Sacral OM  - Noted with hard indurated sacral wound by Wound Care Team  - CT Pelvis (3/29) revealing superficial skin thickening overlying the sacrum with underlying edema of the subcutaneous fat, in keeping with the clinical history of sacral wound. No evidence of underlying tract or drainable fluid collection.  - CT 4/3 - no fluid collection in sacral tissue   - Given ongoing fever with no clear source with obtain MRI Pelvis per ID recs to r/o sacral OM once renal fx improves  - MR pelvis pending, antibiotics as above     # Penile discharge   - GC/ chlamydia negative   - HIV negative   - Monitor for now    # PPX   - MRSA PCR positive and completed bactroban (2/26-3/3)     HEME   # Anemia likely second to ECMO circuit vs AOCD   - HH low in ICU second to ECMO circuit   - HH dropping again today however no bleeding noted   - Microcytic and hemochromic, sent anemia panel 3/19  - Trend HH     VASCULAR   # R IJ and BL LE DVTs   - Post ECMO dopplers on 3/9 negative for BL UE/ LE DVTs.   - Subsequent post ECMO dopplers performed on 3/14 and noted with acute, non-occlusive deep vein thrombosis noted within the right internal jugular vein. Acute, occlusive deep vein thromboses noted within the right and left peroneal veins at both mid calves, and age-indeterminate, occlusive deep vein thrombosis visualized within the left gastrocnemius vein at the proximal calf.     - Continue on argatroban GTT and changed to Eliquis   -  last dose of Eliquis Saturday morning 4/12 in AM in preparation for PEG removal, plan to begin heparin drip     PODIATRY   # BL plantar feet blisters likely pressors induced  - Seen by podiatry and blisters lanced on 3/4 and again on 3/18  - Continue on local wound care per podiatry   - Podiatry following  - OK for WBAT will fu with PT for offloading shoe if appropriate  - Pain mgt consult   - Podiatry FU 3/28 / 4/3 done /following     ENDOCRINE  # Pre-DM2   - A1C 6.7  - Continue on ISS   - Monitor FS   - IF no demand then stop ISS     LINES/ TUBES  - R IJ and R FEM ECMO (2/26-3/7)     SKIN  # Pannus canndial intertrigo   # Sacrum/ buttock MAD  - Continue on clotrimazole cream   - WOC appreciated   -Seen by WC pt noted to have oozing from Buttock wound surgicel placed by wound care On follow up no further bleeding  - Wound care placed orders   - No bleeding from wound     ETHICS/ GOC    - FULL CODE     DISPO - PT following  Seen by PMR for acute rehab per recs

## 2025-04-10 NOTE — PROGRESS NOTE ADULT - NS ATTEND AMEND GEN_ALL_CORE FT
Pt is a 39M with MHx obesity (Class III, BMI 69), pAfib (no AC), HTN, BEA (dz'ed 2019, AHI 34 non-compliant on CPAP set at 10), and history of b/l papilledema attributed to pseudotumor cerebri initially presenting as a transfer [facility] on 2/26 for acute hypoxemic respiratory failure s/p ETT intubation/MV with progression to refractory ARDS s/p initiation of vv-ECMO support (2/26-3/7) with failure to wean from ventilator s/p tracheostomy placement, further found to have RV failure s/p aggressive diuresis and PNA followed by severe sepsis 2/2 panniculitis c/b candida albicans fungemia now transferred to [facility] for further medical management.     Pt now awake and alert, spontaneously communicative and at mental status baseline. Continues to have severe functional debility likely 2/2 prolonged critical illness polyneuropathy but is progressing well. Now able to transfer via [place] to chair daily and is independent of UE and fine motor function. Off all sedation. Weaning pain control, pt remains dependent on Dilaudid prn for LE neuropathic pain. PO regimen added and gabapentin increased without benefit, now on 900mg TID. Dilaudid increased in frequency/dose given persistent uncontrolled pain with improvement. Pain mgmt team consulted, recs appreciated. Podiatry consulted, possible worsening of right 5th digit that may require podiatric sx. Will CTM for now. Pt now with NM WBC scan concerning for deep foot infection, will discuss further with podiatry.     Pt with acute combined hypoxemic and hypercapnic respiratory failure with failure to wean from ventilator s/p tracheostomy placement. Pt was tolerating TC QD, PSV (18/10) qhs. Airway clearance therapy in place. Wean O2 supplementation for goal O2 saturation 90-95%. Aspiration precautions and oral hygiene in place. Decannulated at bedside 3/28, continues to use Bilevel NIV qhs. Serial ABGs wnl.     Pt initially p/w RV failure 2/2 pulmHTN now s/p aggressive diuresis with improvement. Now transitioned to PO diuretics prn with goal to maintain euvolemia. Acetazolamide added today. AFib well controlled conservatively, will continue to monitor on telemetry. TTE 2/25 with new RVE, 3/11 with RVSD. DVT noted on repeat duplex of the right IJ and bilateral peroneal veins. Will c/w full A/C on Eliquis for DVT and AFib. Will need to transition to heparin drip x48 hours prior to PEG removal 4/15.    Pt with oropharyngeal dysphagia s/p PEG placement (3/7) now transitioned to PO diet (3/20) via FEEST. Will continue to monitor on regular PO diet. Plan for PEG removal 4/15 with thoracic sx. Bowel regimen in place prn. PPI ppx. Transaminitis improving. Pt with newly diagnosed hepatomegaly, further mgmt as O/P.     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 TTE (-) endocarditis. Wound care team following. ID recs appreciated. Further found to have worsening sacral ulceration with induration, CT A/P and MRI spine performed without evidence of underlying tract or drainable fluid collection. Now with acute onset fevers, although without new s/s or complaints and with unclear source. Cx resent, empiric abx initiated with improvement but cx NTD. Fever curve downtrending. Wound care team evaluated and is concerned for worsening tunnelling of sacral wound but CT imaging (-) concern for osteomyelitis or occult infectious sources. NM WBC scan performed overnight concerning for active infection with possible osteomyelitis of right pelvis/iliac bone. Evaluated by wound care team again at bedside today, recommended MRI of pelvis to evaluate extent of deep tunnelling. Pt will need long-term Abx ~4 weeks.     Dispo pending medical optimization. Pt full code. DVT ppx in place. Palliative consulted, recs appreciated. Will continue to update family and discuss plan of care throughout hospitalization. Dispo planning to acute rehab initiated.

## 2025-04-10 NOTE — PROGRESS NOTE ADULT - SUBJECTIVE AND OBJECTIVE BOX
Patient is a 39y old  Male who presents with a chief complaint of sob (02 Mar 2025 06:19)      Interval history:   denies new complaint.  seated in chair  afebrile today  reports foot pain    REVIEW OF SYSTEMS  weakness  pain    PAST MEDICAL & SURGICAL HISTORY  HTN (hypertension)    Atrial fibrillation    Papilledema of both eyes    Chronic sinusitis    Morbid obesity    No significant past surgical history       CURRENT FUNCTIONAL STATUS  4/9   Progress Summary: Chart reviewed and content noted. Pt recieved sitting in bedside chair with all lines intact, NAD. Pt tolerated ther ex in bedside chair. Pt left sitting in bedside chair with all lines intact, call button within reach, NAD. Pt responded to treatment well. RN made aware of above, PT to follow.     Education      Person Taught/Method: patient instructed;  verbal instruction;  skill demonstration;  PT roles and goals, benefits of being out of bed and ambulating, not to get up without calling the nurse, bed/bedside ther ex to prevent contractures and weakness.         RECENT LABS/IMAGING  < from: MR Lumbar Spine No Cont (03.30.25 @ 20:24) >  ACC: [id number] EXAM:  MR SPINE LUMBAR   ORDERED BY: ANTONIA SU     PROCEDURE DATE:  03/30/2025          INTERPRETATION:  CLINICAL INFORMATION: Low back pain    ADDITIONAL CLINICAL INFORMATION: Not Applicable    TECHNIQUE: Multiplanar, multisequence MRI was performed of the lumbar   spine.  IV Contrast:    PRIOR STUDIES: No priors available for comparison.    FINDINGS:  Patient unable to complete axial imaging due to patient pain.    LOCALIZER: No additional findings.  BONES: There is no fracture or bone marrow edema.  ALIGNMENT: The alignment is normal.  SACROILIAC JOINTS/SACRUM: There is no sacral fracture. The SI joints are   partially visualized but are intact.  CONUS AND CAUDA EQUINA: The distal cord and conus are normal in signal.   Conus terminates at L1.  VISUALIZED INTRAPELVIC/INTRA-ABDOMINAL SOFT TISSUES: Normal.  PARASPINAL SOFT TISSUES: Normal.      INDIVIDUAL LEVELS:    LOWER THORACIC SPINE: No spinal canal or neuroforaminal stenosis.    L1-L2: No spinal canal or neuroforaminal stenosis.  L2-L3: No spinal canal or neuroforaminal stenosis.  L3-L4: Mild disc degeneration with minimal loss of disc height and signal   within the nucleus pulposus. Mild bulge flattens the ventral thecal sac   and minimally narrows the BILATERAL neural foramina . No spinal canal   stenosis.  L4-L5: Moderate disc degeneration with loss of disc height and signal   within the nucleus pulposus. Disc bulge flattens the ventral thecal sac   and moderately narrows the BILATERAL neural foramina.Superimposed small   central disc herniation further compresses the ventral thecal sac   contributing to mild central stenosis.  L5-S1: No spinal canal or neuroforaminal stenosis.      IMPRESSION:    Limited examination due to lack of axial imaging. Mild L3-4 and moderate   L4-5 disc degeneration with bulges that narrow the BILATERAL neural   foramina due to a mild degree at L3-4 and moderate degree at L4-5.   Superimposed central disc herniation at L4-5 contributes to mild central   stenosis at this level.    --- End of Report ---            RAGHAV GORDILLO MD; Attending Radiologist  This document has been electronically signed. Mar 31 2025  7:08AM    < end of copied text >    < from: CT Abdomen and Pelvis w/ IV Cont (04.03.25 @ 21:42) >    ACC: [id number] EXAM:  CT ABDOMEN AND PELVIS IC   ORDERED BY: SHAUNA ABDI     ACC: [id number] EXAM:  CT CHEST IC   ORDERED BY: SHAUNA ABDI     PROCEDURE DATE:  04/03/2025          INTERPRETATION:  CLINICAL INFORMATION: Fever and leukocytosis. Evaluated   sacral wound for osteomyelitis.    COMPARISON: CT chest abdomen pelvis 3/17/2023    CONTRAST/COMPLICATIONS:  IV Contrast: Omnipaque 350  140 cc administered   60 cc discarded  Oral Contrast: NONE.    PROCEDURE:  CT of the Chest, Abdomen and Pelvis was performed.  Sagittal and coronal reformats were performed.    FINDINGS:  CHEST:  LUNGS AND LARGE AIRWAYS: Patent central airways. Bilateral lung patchy   opacities and lower lung consolidation, mildly decreased.  PLEURA: No pleural effusion.  VESSELS: Within normal limits.  HEART: Heart size is normal. No pericardial effusion.  MEDIASTINUM AND KENDRA: No lymphadenopathy.  CHEST WALL AND LOWER NECK: Within normal limits.    ABDOMEN AND PELVIS:  LIVER: Hepatomegaly.  BILE DUCTS: Normal caliber.  GALLBLADDER: Within normal limits.  SPLEEN: Within normal limits.  PANCREAS: Within normal limits.  ADRENALS: Within normal limits.  KIDNEYS/URETERS: Within normal limits.    BLADDER: Within normal limits.  REPRODUCTIVE ORGANS: Prostate within normal limits.    BOWEL: Gastrostomy tube in the stomach. No bowel obstruction. Appendix is   normal.  PERITONEUM/RETROPERITONEUM: Within normal limits.  VESSELS: Within normal limits.  LYMPH NODES: No lymphadenopathy.  ABDOMINAL WALL: Small fat containing umbilical hernia. Limited   field-of-view of the sacral soft tissues due to body habitus. Visualized   soft tissues demonstrating subcutaneous fat stranding and small droplets   of air. No drainable fluid collection is seen.  BONES: Mild degenerative changes.    IMPRESSION:  Bilateral lung patchy opacities and consolidation, mildly decreased from   prior exam.    Limited field-of-view of the sacral soft tissues due to body habitus.   Visualized soft tissues demonstrating subcutaneous fat stranding and   smalldroplets of air. No drainable fluid collection is seen.    --- End of Report ---          GABINO FULLER MD; Resident Radiologist  This document has been electronically signed.  ASHLYE CHANG MD; Attending Radiologist  This document has been electronically signed. Apr 4 2025 12:18PM    < end of copied text >    CBC Full  -  ( 10 Apr 2025 06:55 )  WBC Count : 11.05 K/uL  RBC Count : 3.17 M/uL  Hemoglobin : 7.9 g/dL  Hematocrit : 27.0 %  Platelet Count - Automated : 173 K/uL  Mean Cell Volume : 85.2 fL  Mean Cell Hemoglobin : 24.9 pg  Mean Cell Hemoglobin Concentration : 29.3 g/dL  Auto Neutrophil # : x  Auto Lymphocyte # : x  Auto Monocyte # : x  Auto Eosinophil # : x  Auto Basophil # : x  Auto Neutrophil % : x  Auto Lymphocyte % : x  Auto Monocyte % : x  Auto Eosinophil % : x  Auto Basophil % : x    04-10    135  |  93[L]  |  13  ----------------------------<  92  4.7   |  26  |  1.74[H]    Ca    9.1      10 Apr 2025 06:55  Phos  6.3     04-10  Mg     1.90     04-10      Urinalysis Basic - ( 10 Apr 2025 06:55 )    Color: x / Appearance: x / SG: x / pH: x  Gluc: 92 mg/dL / Ketone: x  / Bili: x / Urobili: x   Blood: x / Protein: x / Nitrite: x   Leuk Esterase: x / RBC: x / WBC x   Sq Epi: x / Non Sq Epi: x / Bacteria: x        VITALS  T(C): 36.7 (04-10-25 @ 08:00), Max: 37.2 (04-09-25 @ 16:00)  HR: 112 (04-10-25 @ 11:17) (106 - 117)  BP: 139/77 (04-10-25 @ 08:00) (135/86 - 151/98)  RR: 18 (04-10-25 @ 08:00) (18 - 23)  SpO2: 98% (04-10-25 @ 11:17) (96% - 100%)  Wt(kg): --    ALLERGIES  No Known Allergies      MEDICATIONS   acetaminophen     Tablet .. 650 milliGRAM(s) Oral every 6 hours PRN  albuterol/ipratropium for Nebulization 3 milliLiter(s) Nebulizer every 6 hours PRN  amLODIPine   Tablet 10 milliGRAM(s) Oral daily  apixaban 5 milliGRAM(s) Oral two times a day  caspofungin IVPB 70 milliGRAM(s) IV Intermittent every 24 hours  chlorhexidine 2% Cloths 1 Application(s) Topical <User Schedule>  clotrimazole 1% Cream 1 Application(s) Topical two times a day  cyclobenzaprine 5 milliGRAM(s) Oral three times a day PRN  Dakins Solution - 1/4 Strength 1 Application(s) Topical two times a day  dextrose 50% Injectable 25 Gram(s) IV Push once  dextrose 50% Injectable 12.5 Gram(s) IV Push once  dextrose 50% Injectable 25 Gram(s) IV Push once  dextrose Oral Gel 15 Gram(s) Oral once PRN  DULoxetine 30 milliGRAM(s) Oral daily  gabapentin 800 milliGRAM(s) Oral every 8 hours  glucagon  Injectable 1 milliGRAM(s) IntraMuscular once  HYDROmorphone  Injectable 0.5 milliGRAM(s) IV Push every 6 hours PRN  insulin lispro (ADMELOG) corrective regimen sliding scale   SubCutaneous three times a day before meals  insulin lispro (ADMELOG) corrective regimen sliding scale   SubCutaneous at bedtime  lactated ringers. 1000 milliLiter(s) IV Continuous <Continuous>  lidocaine   4% Patch 1 Patch Transdermal daily  lidocaine   4% Patch 1 Patch Transdermal daily  melatonin 3 milliGRAM(s) Oral at bedtime  multivitamin 1 Tablet(s) Oral daily  mupirocin 2% Ointment 1 Application(s) Topical two times a day  pantoprazole    Tablet 40 milliGRAM(s) Oral before breakfast  piperacillin/tazobactam IVPB.. 3.375 Gram(s) IV Intermittent every 8 hours  polyethylene glycol 3350 17 Gram(s) Oral daily  QUEtiapine 25 milliGRAM(s) Oral at bedtime  senna 2 Tablet(s) Oral at bedtime  sevelamer carbonate 800 milliGRAM(s) Oral three times a day with meals      ----------------------------------------------------------------------------------------    PHYSICAL EXAM  Constitutional - NAD    Chest - no respiratory distress  Cardiovascular -mild tachy  Abdomen - +PEG  Extremities - dressings b/l feet  Neurologic Exam -                    Cognitive - Awake, Alert, AAO to self, place, date, year, situation      Communication - fluent     Cranial Nerves - CN 2-12 intact     Motor -                     LEFT    UE - 4+/5                    RIGHT UE - 4+/5                    LEFT    LE -  2+/5                      RIGHT LE -  2-/4     Sensory - Intact to LT       Balance - WNL Static  Psychiatric - Mood stable, Affect WNL  ----------------------------------------------------------------------------------------  ASSESSMENT/PLAN  40 yo m PMHx of morbid obesity, BEA on CPAP, pAFIB (not on AC), HTN, and BL papilledema attributed to pseudotumor cerebri who presented initially to [facility] on 2/24 with SOB and BL LE edema. Found with URI outpatient with progressive SOB, and concern for noted for MFPNA on imaging. Course progressed with AHRF with worsening O2 demand, respiratory distress, secretions, and somnolence requiring intubation (difficult with success after 6 attempts) in ED and admission to [facility] MICU. While in MICU, patient noted with increasing O2 demand with no improvement despite optimal vent management. Concern noted for progressive ARDS, unable to prone given morbid obesity, and ultimately cannulated for vvECMO on 2/25 and transferred to [facility] for further management on 2/26.   transferred to [facility] 3/11, course complicated by fever.    unclear source of yeast in blood cultures. treated for abdominal wall cellulitis, however persistent fevers  MRI pelvis for concern for OM R iliac bone  s/p trach and peg 3/7   planning for peg removal  continue bedside PT and OT     Pain - acetaminophen, dilaudid prn, gabapentin 800mg q8  DVT PPX - on eliquis  Rehab -  recommend acute rehab when medically cleared.  Patient can tolerate 3 hours per day of therapy with medical supervision.  Would need to be afebrile and off iv pain medications prior to discharge.       Total time spent to review relevant records and imaging results, examine patient, complete documentation, and when applicable discuss the case with the patient, family, , social workers, and medical team: 35  minutes

## 2025-04-10 NOTE — CHART NOTE - NSCHARTNOTEFT_GEN_A_CORE
NUTRITION FOLLOW UP NOTE    Pt seen for follow up-LOS    SOURCE: [x] Patient [x] Medical record [x] Family/Caregiver     Medical Course: per chart review, 39-year-old Male with PMHx of morbid obesity, BEA on CPAP, pAFIB (not on AC), HTN, and BL papilledema attributed to pseudotumor cerebri who presented initially to [facility] on 2/24 with SOB and BL LE edema. Found with URI outpatient with progressive SOB, and concern for noted for MFPNA on imaging. Course progressed with AHRF with worsening O2 demand, respiratory distress, secretions, and somnolence requiring intubation (difficult with success after 6 attempts) in ED and admission to [facility] MICU.     Diet Prescription: Diet, Soft and Bite Sized:   Consistent Carbohydrate {Evening Snack} (CSTCHOSN)  Supplement Feeding Modality:  Oral  Ensure Max Cans or Servings Per Day:  1       Frequency:  Two Times a day (04-04-25 @ 15:13) [Active]    Nutrition Course: Met patient and  his father at bedside. Patient alert, A&O x4. Patient reports good appetite in hospital. Per RN flowsheets good intake. Good acceptance of Ensure Max per patient. Patient denies any in house nausea, vomiting, diarrhea, or constipation. Last BM noted on 4/9 per RN flowsheets. Bowel regimen is in placed. Patient receives soft & bite sized consistency. Per labs review, consider adding no concentrated phosphorus diet and continue consistent carbohydrate diet with evening snacks. Encourage PO intake and honor food preferences as able.  RD to remain available for further nutritional interventions as indicated       Food Allergy/Intolerance: NKFA  -    Pertinent Medications: MEDICATIONS  (STANDING):  amLODIPine   Tablet 10 milliGRAM(s) Oral daily  apixaban 5 milliGRAM(s) Oral two times a day  caspofungin IVPB 70 milliGRAM(s) IV Intermittent every 24 hours  chlorhexidine 2% Cloths 1 Application(s) Topical <User Schedule>  clotrimazole 1% Cream 1 Application(s) Topical two times a day  Dakins Solution - 1/4 Strength 1 Application(s) Topical two times a day  dextrose 50% Injectable 25 Gram(s) IV Push once  dextrose 50% Injectable 12.5 Gram(s) IV Push once  dextrose 50% Injectable 25 Gram(s) IV Push once  DULoxetine 30 milliGRAM(s) Oral daily  gabapentin 800 milliGRAM(s) Oral every 8 hours  glucagon  Injectable 1 milliGRAM(s) IntraMuscular once  insulin lispro (ADMELOG) corrective regimen sliding scale   SubCutaneous three times a day before meals  insulin lispro (ADMELOG) corrective regimen sliding scale   SubCutaneous at bedtime  lactated ringers. 1000 milliLiter(s) (100 mL/Hr) IV Continuous <Continuous>  lidocaine   4% Patch 1 Patch Transdermal daily  lidocaine   4% Patch 1 Patch Transdermal daily  melatonin 3 milliGRAM(s) Oral at bedtime  multivitamin 1 Tablet(s) Oral daily  mupirocin 2% Ointment 1 Application(s) Topical two times a day  pantoprazole    Tablet 40 milliGRAM(s) Oral before breakfast  piperacillin/tazobactam IVPB.. 3.375 Gram(s) IV Intermittent every 8 hours  polyethylene glycol 3350 17 Gram(s) Oral daily  QUEtiapine 25 milliGRAM(s) Oral at bedtime  senna 2 Tablet(s) Oral at bedtime  sevelamer carbonate 800 milliGRAM(s) Oral three times a day with meals    MEDICATIONS  (PRN):  acetaminophen     Tablet .. 650 milliGRAM(s) Oral every 6 hours PRN Temp greater or equal to 38C (100.4F), Mild Pain (1 - 3), Moderate Pain (4 - 6)  albuterol/ipratropium for Nebulization 3 milliLiter(s) Nebulizer every 6 hours PRN Shortness of Breath and/or Wheezing  cyclobenzaprine 5 milliGRAM(s) Oral three times a day PRN Muscle Spasm  dextrose Oral Gel 15 Gram(s) Oral once PRN Blood Glucose LESS THAN 70 milliGRAM(s)/deciliter  HYDROmorphone  Injectable 0.5 milliGRAM(s) IV Push every 6 hours PRN dressing changes and severe breakthrough    [x] Relevant notes on medications: insulin, multivitamin, sevelamer carbonate    Pertinent Labs: 04-10 Na135 mmol/L Glu 92 mg/dL K+ 4.7 mmol/L Cr  1.74 mg/dL[H] BUN 13 mg/dL 04-10 Phos 6.3 mg/dL[H]     A1C with Estimated Average Glucose Result: 6.7 % (02-25-25 @ 05:44)        POCT Blood Glucose.: 106 mg/dL (10 Apr 2025 11:19)  POCT Blood Glucose.: 101 mg/dL (10 Apr 2025 07:50)  POCT Blood Glucose.: 103 mg/dL (09 Apr 2025 22:39)  POCT Blood Glucose.: 105 mg/dL (09 Apr 2025 17:00)    [x] Relevant notes on labs: Labs reviewed, A1c 6.7% indicating fair control, fingersticks between 89-105mg/dL, Na and K WNL, hyperphosphatemia (6.3) noted    Weight: 197.1kg (4/1)  Weight Assessment: weight loss noted most likely secondary to fluid shifts. Edema noted  Height: 67in / 170.2cm  IBW: 148lbs / 67.3kg +/-10%  BMI: 68kg/m^2 (197.1kg)    Physical Assessment, per flowsheets:  Edema: 1+ generalized. Hx of severe edema (4+) noted  Pressure Injury: sacrum - suspected DTI per RN flowsheets 4/9      Estimated Needs:   [x ] recalculated, for consider with his BMI, based on IBW + 10% 162.8lbs/ 74kg    1850-2220kcal daily @ 25-30kcal/kg, 103.6-118.4 gm protein daily @ 1.4-1.6gm/kg     Previous Nutrition Diagnosis:   [x ] Unintended Weight Loss  Nutrition Diagnosis is [x ] ongoing  [ ] resolved [ ] not applicable   New Nutrition Diagnosis: [x ] not applicable     Education:  [ x] Provided on previous assessment by RD      Interventions:   1) Per labs review, consider adding no concentrated phosphorus diet and continue consistent carbohydrate diet with evening snacks.   2) Continue Ensure Max 2x daily (300cal, 60gm pro)  3) Obtain daily weights   4) Encourage PO intake and honor food preferences       Monitor & Evaluate:  PO intake, tolerance to diet/supplement, nutrition related lab values, weight trends, BMs/GI distress, hydration status, skin integrity.    Cullen Garcia, MS, RDN, CDN    Also available on [employer] Teams

## 2025-04-10 NOTE — PROGRESS NOTE ADULT - SUBJECTIVE AND OBJECTIVE BOX
CHIEF COMPLAINT: Patient is a 39y old  Male who presents with a chief complaint of sob (02 Mar 2025 06:19)      INTERVAL EVENTS:   - no acute overnight events, VSS, sinus tachycardia on 4 L NC  - Continued on IV Zosyn and Caspo  - MR pelvis pending    ROS: Seen by bedside during AM rounds     OBJECTIVE:  ICU Vital Signs Last 24 Hrs  T(C): 37.1 (09 Apr 2025 22:45), Max: 37.2 (09 Apr 2025 16:00)  T(F): 98.8 (09 Apr 2025 22:45), Max: 98.9 (09 Apr 2025 16:00)  HR: 109 (10 Apr 2025 06:32) (106 - 117)  BP: 151/98 (09 Apr 2025 22:45) (132/68 - 151/98)  BP(mean): 114 (09 Apr 2025 22:45) (85 - 114)  ABP: --  ABP(mean): --  RR: 23 (09 Apr 2025 22:45) (18 - 23)  SpO2: 100% (10 Apr 2025 06:32) (95% - 100%)    O2 Parameters below as of 09 Apr 2025 22:45  Patient On (Oxygen Delivery Method): nasal cannula  O2 Flow (L/min): 2            04-09 @ 07:01  -  04-10 @ 07:00  --------------------------------------------------------  IN: 0 mL / OUT: 1800 mL / NET: -1800 mL      CAPILLARY BLOOD GLUCOSE      POCT Blood Glucose.: 103 mg/dL (09 Apr 2025 22:39)      PHYSICAL EXAM:  General:   HEENT:   Lymph Nodes:  Neck:   Respiratory:   Cardiovascular:   Abdomen:   Extremities:   Skin:   Neurological:  Psychiatry:        HOSPITAL MEDICATIONS:  MEDICATIONS  (STANDING):  amLODIPine   Tablet 10 milliGRAM(s) Oral daily  apixaban 5 milliGRAM(s) Oral every 12 hours  caspofungin IVPB 70 milliGRAM(s) IV Intermittent every 24 hours  chlorhexidine 2% Cloths 1 Application(s) Topical <User Schedule>  clotrimazole 1% Cream 1 Application(s) Topical two times a day  Dakins Solution - 1/4 Strength 1 Application(s) Topical two times a day  dextrose 50% Injectable 25 Gram(s) IV Push once  dextrose 50% Injectable 12.5 Gram(s) IV Push once  dextrose 50% Injectable 25 Gram(s) IV Push once  DULoxetine 30 milliGRAM(s) Oral daily  gabapentin 800 milliGRAM(s) Oral every 8 hours  glucagon  Injectable 1 milliGRAM(s) IntraMuscular once  insulin lispro (ADMELOG) corrective regimen sliding scale   SubCutaneous three times a day before meals  insulin lispro (ADMELOG) corrective regimen sliding scale   SubCutaneous at bedtime  lidocaine   4% Patch 1 Patch Transdermal daily  lidocaine   4% Patch 1 Patch Transdermal daily  melatonin 3 milliGRAM(s) Oral at bedtime  multivitamin 1 Tablet(s) Oral daily  mupirocin 2% Ointment 1 Application(s) Topical two times a day  pantoprazole    Tablet 40 milliGRAM(s) Oral before breakfast  piperacillin/tazobactam IVPB.. 3.375 Gram(s) IV Intermittent every 8 hours  polyethylene glycol 3350 17 Gram(s) Oral daily  QUEtiapine 25 milliGRAM(s) Oral at bedtime  senna 2 Tablet(s) Oral at bedtime    MEDICATIONS  (PRN):  acetaminophen     Tablet .. 650 milliGRAM(s) Oral every 6 hours PRN Temp greater or equal to 38C (100.4F), Mild Pain (1 - 3), Moderate Pain (4 - 6)  albuterol/ipratropium for Nebulization 3 milliLiter(s) Nebulizer every 6 hours PRN Shortness of Breath and/or Wheezing  cyclobenzaprine 5 milliGRAM(s) Oral three times a day PRN Muscle Spasm  dextrose Oral Gel 15 Gram(s) Oral once PRN Blood Glucose LESS THAN 70 milliGRAM(s)/deciliter  HYDROmorphone  Injectable 0.5 milliGRAM(s) IV Push every 6 hours PRN dressing changes and severe breakthrough      LABS:                        7.9    x     )-----------( x        ( 10 Apr 2025 06:55 )             27.0     04-09    137  |  94[L]  |  13  ----------------------------<  81  3.9   |  32[H]  |  1.89[H]    Ca    9.3      09 Apr 2025 05:25  Phos  6.2     04-09  Mg     1.80     04-09        Urinalysis Basic - ( 09 Apr 2025 05:25 )    Color: x / Appearance: x / SG: x / pH: x  Gluc: 81 mg/dL / Ketone: x  / Bili: x / Urobili: x   Blood: x / Protein: x / Nitrite: x   Leuk Esterase: x / RBC: x / WBC x   Sq Epi: x / Non Sq Epi: x / Bacteria: x         CHIEF COMPLAINT: Patient is a 39y old  Male who presents with a chief complaint of sob (02 Mar 2025 06:19)      INTERVAL EVENTS:   - no acute overnight events, VSS, sinus tachycardia on 4 L NC  - Continued on IV Zosyn and Caspo  - MR pelvis pending    ROS: Seen by bedside during AM rounds, endorses severe ongoing burning pain in b/l feet     OBJECTIVE:  ICU Vital Signs Last 24 Hrs  T(C): 37.1 (09 Apr 2025 22:45), Max: 37.2 (09 Apr 2025 16:00)  T(F): 98.8 (09 Apr 2025 22:45), Max: 98.9 (09 Apr 2025 16:00)  HR: 109 (10 Apr 2025 06:32) (106 - 117)  BP: 151/98 (09 Apr 2025 22:45) (132/68 - 151/98)  BP(mean): 114 (09 Apr 2025 22:45) (85 - 114)  ABP: --  ABP(mean): --  RR: 23 (09 Apr 2025 22:45) (18 - 23)  SpO2: 100% (10 Apr 2025 06:32) (95% - 100%)    O2 Parameters below as of 09 Apr 2025 22:45  Patient On (Oxygen Delivery Method): nasal cannula  O2 Flow (L/min): 2            04-09 @ 07:01  -  04-10 @ 07:00  --------------------------------------------------------  IN: 0 mL / OUT: 1800 mL / NET: -1800 mL      CAPILLARY BLOOD GLUCOSE      POCT Blood Glucose.: 103 mg/dL (09 Apr 2025 22:39)      PHYSICAL EXAM:  General: NAD, sitting in chair  Neck: neck supple, no JVD, trachea midline, c/d/i bandage over prior tracheotomy site  Respiratory: +diminished breath sounds at b/l bases, normal respiratory effort, no rales/ rhonchi or wheezing  Cardiovascular: +sinus tachycardia  Abdomen: soft, non tender, +PEG  Extremities: no LE edema b/l, c/d/i bandages over b/l feet  Skin: warm, dry  Neurological: AAO x 3, awake and alert, moving extremities freely, no gross focal deficits  Psychiatry: normal affect and behavior        HOSPITAL MEDICATIONS:  MEDICATIONS  (STANDING):  amLODIPine   Tablet 10 milliGRAM(s) Oral daily  apixaban 5 milliGRAM(s) Oral every 12 hours  caspofungin IVPB 70 milliGRAM(s) IV Intermittent every 24 hours  chlorhexidine 2% Cloths 1 Application(s) Topical <User Schedule>  clotrimazole 1% Cream 1 Application(s) Topical two times a day  Dakins Solution - 1/4 Strength 1 Application(s) Topical two times a day  dextrose 50% Injectable 25 Gram(s) IV Push once  dextrose 50% Injectable 12.5 Gram(s) IV Push once  dextrose 50% Injectable 25 Gram(s) IV Push once  DULoxetine 30 milliGRAM(s) Oral daily  gabapentin 800 milliGRAM(s) Oral every 8 hours  glucagon  Injectable 1 milliGRAM(s) IntraMuscular once  insulin lispro (ADMELOG) corrective regimen sliding scale   SubCutaneous three times a day before meals  insulin lispro (ADMELOG) corrective regimen sliding scale   SubCutaneous at bedtime  lidocaine   4% Patch 1 Patch Transdermal daily  lidocaine   4% Patch 1 Patch Transdermal daily  melatonin 3 milliGRAM(s) Oral at bedtime  multivitamin 1 Tablet(s) Oral daily  mupirocin 2% Ointment 1 Application(s) Topical two times a day  pantoprazole    Tablet 40 milliGRAM(s) Oral before breakfast  piperacillin/tazobactam IVPB.. 3.375 Gram(s) IV Intermittent every 8 hours  polyethylene glycol 3350 17 Gram(s) Oral daily  QUEtiapine 25 milliGRAM(s) Oral at bedtime  senna 2 Tablet(s) Oral at bedtime    MEDICATIONS  (PRN):  acetaminophen     Tablet .. 650 milliGRAM(s) Oral every 6 hours PRN Temp greater or equal to 38C (100.4F), Mild Pain (1 - 3), Moderate Pain (4 - 6)  albuterol/ipratropium for Nebulization 3 milliLiter(s) Nebulizer every 6 hours PRN Shortness of Breath and/or Wheezing  cyclobenzaprine 5 milliGRAM(s) Oral three times a day PRN Muscle Spasm  dextrose Oral Gel 15 Gram(s) Oral once PRN Blood Glucose LESS THAN 70 milliGRAM(s)/deciliter  HYDROmorphone  Injectable 0.5 milliGRAM(s) IV Push every 6 hours PRN dressing changes and severe breakthrough      LABS:                        7.9    x     )-----------( x        ( 10 Apr 2025 06:55 )             27.0     04-09    137  |  94[L]  |  13  ----------------------------<  81  3.9   |  32[H]  |  1.89[H]    Ca    9.3      09 Apr 2025 05:25  Phos  6.2     04-09  Mg     1.80     04-09        Urinalysis Basic - ( 09 Apr 2025 05:25 )    Color: x / Appearance: x / SG: x / pH: x  Gluc: 81 mg/dL / Ketone: x  / Bili: x / Urobili: x   Blood: x / Protein: x / Nitrite: x   Leuk Esterase: x / RBC: x / WBC x   Sq Epi: x / Non Sq Epi: x / Bacteria: x

## 2025-04-10 NOTE — CHART NOTE - NSCHARTNOTEFT_GEN_A_CORE
Provider called to expedite MRI. Spoke with MRI department and confirmed patient is on MRI schedule for today.    Willis Sosa PA-C,   Internal Medicine ACP   In house pager #[phone]

## 2025-04-10 NOTE — PROGRESS NOTE ADULT - SUBJECTIVE AND OBJECTIVE BOX
Infectious Diseases Follow Up:    Patient is a 39y old  Male who presents with a chief complaint of sob (02 Mar 2025 06:19)      Interval History/ROS:  Afebrile, pt is feeling okay, still w/ b/l foot pain     Allergies  No Known Allergies        ANTIMICROBIALS:  caspofungin IVPB 70 every 24 hours  piperacillin/tazobactam IVPB.. 3.375 every 8 hours      Current Abx:     Previous Abx     OTHER MEDS:  MEDICATIONS  (STANDING):  acetaminophen     Tablet .. 650 every 6 hours PRN  albuterol/ipratropium for Nebulization 3 every 6 hours PRN  amLODIPine   Tablet 10 daily  apixaban 5 two times a day  cyclobenzaprine 5 three times a day PRN  dextrose 50% Injectable 25 once  dextrose 50% Injectable 12.5 once  dextrose 50% Injectable 25 once  dextrose Oral Gel 15 once PRN  DULoxetine 30 daily  gabapentin 800 every 8 hours  glucagon  Injectable 1 once  HYDROmorphone  Injectable 0.5 every 6 hours PRN  insulin lispro (ADMELOG) corrective regimen sliding scale  three times a day before meals  insulin lispro (ADMELOG) corrective regimen sliding scale  at bedtime  melatonin 3 at bedtime  pantoprazole    Tablet 40 before breakfast  polyethylene glycol 3350 17 daily  QUEtiapine 25 at bedtime  senna 2 at bedtime      Vital Signs Last 24 Hrs  T(C): 36.7 (10 Apr 2025 08:00), Max: 37.2 (09 Apr 2025 16:00)  T(F): 98 (10 Apr 2025 08:00), Max: 98.9 (09 Apr 2025 16:00)  HR: 112 (10 Apr 2025 11:17) (106 - 117)  BP: 139/77 (10 Apr 2025 08:00) (135/86 - 151/98)  BP(mean): 96 (10 Apr 2025 08:00) (96 - 114)  RR: 18 (10 Apr 2025 08:00) (18 - 23)  SpO2: 98% (10 Apr 2025 11:17) (96% - 100%)    Parameters below as of 10 Apr 2025 08:00  Patient On (Oxygen Delivery Method): nasal cannula  O2 Flow (L/min): 2      PHYSICAL EXAM:  GENERAL: NAD  HEAD:  Atraumatic, Normocephalic  EYES: EOMI, conjunctiva and sclera clear  CHEST/LUNG: On NC, CTAB  HEART: RRR  ABDOMEN: Soft, Nontender, Nondistended; sacral wound examined with wound care on 4/9, pt with large soft tissue ulcer on R buttocks, extensive tunneling to left buttocks, murky fluid noted   Extremities: B/l feet wrapped   PSYCH: AAOx3                            7.9    11.05 )-----------( 173      ( 10 Apr 2025 06:55 )             27.0       04-10    135  |  93[L]  |  13  ----------------------------<  92  4.7   |  26  |  1.74[H]    Ca    9.1      10 Apr 2025 06:55  Phos  6.3     04-10  Mg     1.90     04-10        Urinalysis Basic - ( 10 Apr 2025 06:55 )    Color: x / Appearance: x / SG: x / pH: x  Gluc: 92 mg/dL / Ketone: x  / Bili: x / Urobili: x   Blood: x / Protein: x / Nitrite: x   Leuk Esterase: x / RBC: x / WBC x   Sq Epi: x / Non Sq Epi: x / Bacteria: x        MICROBIOLOGY:  v  Blood Blood-Venous  04-07-25   No growth at 72 Hours  --  --      Blood Blood-Peripheral  04-07-25   No growth at 72 Hours  --  --      Clean Catch Clean Catch (Midstream)  03-31-25   <10,000 CFU/mL Normal Urogenital Kyle  --  --      Blood Blood-Peripheral  03-30-25   No growth at 5 days  --  --      Trach Asp Tracheal Aspirate  03-21-25   Commensal kyle consistent with body site  --    Few polymorphonuclear leukocytes per low power field  No Squamous epithelial cells per low power field  No organisms seen per oil power field      Blood Blood-Peripheral  03-20-25   No growth at 5 days  --  --      Blood Blood-Venous  03-20-25   No growth at 5 days  --  --      Blood Blood-Peripheral  03-18-25   No growth at 5 days  --  Blood Culture PCR  Candida albicans      Nares/Axilla/Groin Nares/Axilla/Groin  03-17-25   Culture NEGATIVE for Candida auris.  This surveillance culture is intended for Infection Control purposes only.  A negative result does not preclude the carriage of other fungal  organisms.  --  --      Blood Blood-Venous  03-16-25   Growth in aerobic bottle: Candida albicans  See previous culture [id number]  --    Growth in aerobic bottle: Yeast like cells      Blood Blood-Peripheral  03-16-25   Growth in aerobic bottle: Candida albicans  See previous culture [id number]  --    Growth in aerobic bottle: Yeast like cells      Trach Asp Tracheal Aspirate  03-16-25   Commensal kyle consistent with body site  --    Few polymorphonuclear leukocytes per low power field  Rare Squamous epithelial cells per low power field  Rare Gram Positive Rods seen per oil power field  Rare Gram Negative Rods seen per oil power field      Blood Blood-Venous  03-15-25   Growth in aerobic bottle: Candida albicans  See previous culture [id number]  --    Growth in aerobic bottle: Yeast like cells      Blood Blood-Peripheral  03-15-25   Growth in aerobic bottle: Candida albicans  Direct identification is available within approximately 3-5  hours either by Blood Panel Multiplexed PCR or Direct  MALDI-TOF. Details: [email or web address]  --  Blood Culture PCR  Candida albicans      Catheterized Catheterized  03-12-25   <10,000 CFU/mL Normal Urogenital Kyle  --  --      Blood Blood-Peripheral  03-11-25   No growth at 5 days  --  --                RADIOLOGY:

## 2025-04-10 NOTE — PROGRESS NOTE ADULT - SUBJECTIVE AND OBJECTIVE BOX
[facility]-- WOUND TEAM -- FOLLOW UP NOTE  --------------------------------------------------------------------------------    subjective: Patient seen and examined today with patient's father at bedside. Per patient his feet continue to cause significant pain. Reports discomfort to sacral wound during wound dressing changes. Otherwise reports no major changes. Denies Cp, SOB, abdominal pain, n/v, fever, chills. Reports he is looking forward to having his PEG removed- tentative plan for next week.    Interval HPI/24 hour events:   -Afebrile, WBC downtrending  -Pending MRI sacrum with extended cuts  -Last seen by Podiatry 4/9/25      Chart reviewed including labs and relevant images      Diet:  Diet, Soft and Bite Sized:   Consistent Carbohydrate Evening Snack (CSTCHOSN)  Supplement Feeding Modality:  Oral  Ensure Max Cans or Servings Per Day:  1       Frequency:  Two Times a day (04-04-25 @ 15:13) [Active]      ROS: General, skin see above.   All other systems negative.    ALLERGIES & MEDICATIONS  --------------------------------------------------------------------------------  Allergies    No Known Allergies    Intolerances          STANDING INPATIENT MEDICATIONS    amLODIPine   Tablet 10 milliGRAM(s) Oral daily  apixaban 5 milliGRAM(s) Oral two times a day  caspofungin IVPB 70 milliGRAM(s) IV Intermittent every 24 hours  chlorhexidine 2% Cloths 1 Application(s) Topical <User Schedule>  clotrimazole 1% Cream 1 Application(s) Topical two times a day  Dakins Solution - 1/4 Strength 1 Application(s) Topical two times a day  dextrose 50% Injectable 25 Gram(s) IV Push once  dextrose 50% Injectable 12.5 Gram(s) IV Push once  dextrose 50% Injectable 25 Gram(s) IV Push once  DULoxetine 30 milliGRAM(s) Oral daily  gabapentin 800 milliGRAM(s) Oral every 8 hours  glucagon  Injectable 1 milliGRAM(s) IntraMuscular once  insulin lispro (ADMELOG) corrective regimen sliding scale   SubCutaneous three times a day before meals  insulin lispro (ADMELOG) corrective regimen sliding scale   SubCutaneous at bedtime  lactated ringers. 1000 milliLiter(s) IV Continuous <Continuous>  lidocaine   4% Patch 1 Patch Transdermal daily  lidocaine   4% Patch 1 Patch Transdermal daily  melatonin 3 milliGRAM(s) Oral at bedtime  multivitamin 1 Tablet(s) Oral daily  mupirocin 2% Ointment 1 Application(s) Topical two times a day  pantoprazole    Tablet 40 milliGRAM(s) Oral before breakfast  piperacillin/tazobactam IVPB.. 3.375 Gram(s) IV Intermittent every 8 hours  polyethylene glycol 3350 17 Gram(s) Oral daily  QUEtiapine 25 milliGRAM(s) Oral at bedtime  senna 2 Tablet(s) Oral at bedtime  sevelamer carbonate 800 milliGRAM(s) Oral three times a day with meals      PRN INPATIENT MEDICATION  acetaminophen     Tablet .. 650 milliGRAM(s) Oral every 6 hours PRN  albuterol/ipratropium for Nebulization 3 milliLiter(s) Nebulizer every 6 hours PRN  cyclobenzaprine 5 milliGRAM(s) Oral three times a day PRN  dextrose Oral Gel 15 Gram(s) Oral once PRN  HYDROmorphone  Injectable 0.5 milliGRAM(s) IV Push every 6 hours PRN        Vital signs:  T(C): 36.7 (04-10-25 @ 08:00), Max: 37.2 (04-09-25 @ 16:00)  HR: 112 (04-10-25 @ 11:17) (106 - 117)  BP: 139/77 (04-10-25 @ 08:00) (135/86 - 151/98)  RR: 18 (04-10-25 @ 08:00) (18 - 23)  SpO2: 98% (04-10-25 @ 11:17) (96% - 100%)  Wt(kg): 197.1 kg (04-)        04-09-25 @ 07:01  -  04-10-25 @ 07:00  --------------------------------------------------------  IN: 0 mL / OUT: 4500 mL / NET: -4500 mL      Constitutional: NAD, A&OX3. Morbidly Obese. Patient able to assist with turning bilaterally.  (+) bariatric low airloss support surface, (+) fluidized positioning devices, bariatric seat cushion on chair, heels slightly offloaded with pillow  HEENT: NC/AT, non icteric, mucosa moist. Tracheostomy site with small gauze and tegaderm dressing c/d/i  Cardiovascular: tachycardic   Respiratory: supplemental oxygen via NC, nonlabored, equal chest expansion.  Gastrointestinal: soft NT/ND. (+) PEG, peristomal intact.  Increase moisture beneath ABD pannus and bilateral groin, right groin denuded epidermis [id number]- pink moist dermis, no drainage, periwound intact, no edema, no erythema, no increased warmth, no induration, no fluctuance, no crepitus.  No BM during exam.  : penile pouch in place, seal intact.  Musculoskeletal:  aROM to b/l UE, b/l le limited aROM, requires 1 person assistance with turning and positioning, no gross deformities or contractures.  Vascular: Deferred, to Podiatry, b/l feet dressings c/d/i.  Skin:  moist w/ good turgor.   Intertriginous folds of posterior trunk along bilateral flank increased moisture, skin intact- linear hyperpigmentation resolved.  Bilateral gluteal folds with denuded epidermis within intertriginous folds <2qlm7rsp4.1cm, exposing pink moist dermis, periwound skin intact.  Sacrum/gluteal cleft and bilateral buttocks Stage 4 complicated by moisture and incontinence (pt turned to right side)  -Sacral/gluteal cleft- 8.5cmx3.5ljx6bh (stable), with digital probing and use of Qtip loculations were broken revealing deeper tunnels at 11 o'clock extending 8cm and 12-1 o'clock extending at least 13cm, towards right posterior iliac crest, undermining at 2-3 o'clock extending 4cm (prev 3.5cm).  -Tissue type 30% tan-grey firmly attached nonfluctuant softening slough, surrounding pink-red moist subcutaneous base and agranulation friable with cleansing, unable to palpable bone due to body habitus.  -Copious seropurulent drainage expressed from deep tunnel from 12-1o'clock, no odor.   -Shallow ulcerations to bilateral buttocks extending from wound edge:   Right buttock shallow ulceration- 8.5cmx6.5cmx0.2cm (stable), satellite denuded epidermis 3.5cm from distal edge 1rss0rwl5.2cm (stable).  Left buttock shallow ulceration- 1.5cmx2.5cmx0.2cm (stable)  Tissue type of satellite ulcerations 100% pink-moist dermis with scattered fibrinous exudate and re-epithelialization migrating from wound edges.  Periwound skin with area of slowly resolving nonfluctuant induration, no longer well defined, extending from right buttock satellite ulceration from 12-3 o'clock unable to measure, remaining periwound skin without obvious erythema, no edema, no increased warmth, no induration, no fluctuance, no crepitus.          LABS/ CULTURES/ RADIOLOGY:              7.9    11.05 >-----------<  173      [04-10-25 @ 06:55]              27.0     135  |  93  |  13  ----------------------------<  92      [04-10-25 @ 06:55]  4.7   |  26  |  1.74        Ca     9.1     [04-10-25 @ 06:55]      Mg     1.90     [04-10-25 @ 06:55]      Phos  6.3     [04-10-25 @ 06:55]                CAPILLARY BLOOD GLUCOSE      POCT Blood Glucose.: 106 mg/dL (10 Apr 2025 11:19)  POCT Blood Glucose.: 101 mg/dL (10 Apr 2025 07:50)  POCT Blood Glucose.: 103 mg/dL (09 Apr 2025 22:39)  POCT Blood Glucose.: 105 mg/dL (09 Apr 2025 17:00)        A1C with Estimated Average Glucose Result: 6.7 % (02-25-25 @ 05:44)        Culture - Blood (collected 04-07-25 @ 07:06)  Source: Blood Blood-Venous  Preliminary Report (04-10-25 @ 09:01):    No growth at 72 Hours    Culture - Blood (collected 04-07-25 @ 06:55)  Source: Blood Blood-Peripheral  Preliminary Report (04-10-25 @ 09:01):    No growth at 72 Hours          ACC: [id number] EXAM:  NM MULTI DAY PROCEDURE   ORDERED BY: CRYSTAL PATEL     ACC: [id number] EXAM:  NM INFLAMM LOC WBC WB SD   ORDERED BY: CRYSTAL PATEL     PROCEDURE DATE:  04/08/2025          INTERPRETATION:  HISTORY/REASON FOR EXAM: 39-year-old male with shortness   of breath, lower extremity edema and fungemia. Referred for evaluation of   focal infection.    RADIOPHARMACEUTICAL: 530 uCi In-111 labeled autologous leukocytes.    TECHNIQUE: Approximately 18-20 hours following administration of 0.553    mCi In-111 labeled autologous leukocytes, whole body imaging in anterior   and posterior views was performed. Additional static images of both   femurs and both feet were obtained.  SPECT of the pelvis was planned but   could not be performed due to technical limitations to accommodate   patient's habitus and only anterior and posterior static images of the   pelvis could be obtained.    COMPARISON: CT chest, abdomen and pelvis 04/03/2025.    SCINTIGRAPHIC FINDINGS: Increased activity could be seen in the right   right side of the pelvis more intense on posterior image but could also   be seen on the anterior image indicating a considerable area of soft   tissue infection. There is also slightly more intense activity in the   right iliac bone compared to the left raising possibility of   osteomyelitis.    Intense activity around the lateral aspect of the right forefoot   compatible with soft tissue infection, presence of complicating   osteomyelitis is not excluded. Milder activity in the medial aspect of   the left forefoot may reflect soft tissue infection. Please correlate   findings with clinical examination.    No focal abnormal activity in the lungs that is suggestive of pneumonia.    IMPRESSION:  1. Large area of soft tissue infection in the right side of the pelvis as   detailed above. Slightly more intense activity in the right iliac bone   compared to the left raises possibility of osteomyelitis.    2. Intense activity in the lateral aspect of the right forefoot   compatible with soft tissue infection; complicating osteomyelitis is not   excluded. Milder activity in the medial aspect of the left forefoot may   reflect soft tissue infection. Please correlate findings with clinical   examination.    3.No evidence of pneumonia.    --- End of Report ---            RADHA GOMEZ MD; Attending Radiologist  This document has been electronically signed. Apr 8 2025  4:49PM     [facility]-- WOUND TEAM -- FOLLOW UP NOTE  --------------------------------------------------------------------------------    subjective: Patient seen and examined today with patient's father at bedside. Per patient his feet continue to cause significant pain. Reports discomfort to sacral wound during wound dressing changes. Otherwise reports no major changes. Denies Cp, SOB, abdominal pain, n/v, fever, chills. Reports he is looking forward to having his PEG removed- tentative plan for next week.    Interval HPI/24 hour events:   -Afebrile, WBC downtrending  -Pending MRI sacrum with extended cuts  -Last seen by Podiatry 4/9/25      Chart reviewed including labs and relevant images      Diet:  Diet, Soft and Bite Sized:   Consistent Carbohydrate Evening Snack (CSTCHOSN)  Supplement Feeding Modality:  Oral  Ensure Max Cans or Servings Per Day:  1       Frequency:  Two Times a day (04-04-25 @ 15:13) [Active]      ROS: General, skin see above.   All other systems negative.    ALLERGIES & MEDICATIONS  --------------------------------------------------------------------------------  Allergies    No Known Allergies    Intolerances          STANDING INPATIENT MEDICATIONS    amLODIPine   Tablet 10 milliGRAM(s) Oral daily  apixaban 5 milliGRAM(s) Oral two times a day  caspofungin IVPB 70 milliGRAM(s) IV Intermittent every 24 hours  chlorhexidine 2% Cloths 1 Application(s) Topical <User Schedule>  clotrimazole 1% Cream 1 Application(s) Topical two times a day  Dakins Solution - 1/4 Strength 1 Application(s) Topical two times a day  dextrose 50% Injectable 25 Gram(s) IV Push once  dextrose 50% Injectable 12.5 Gram(s) IV Push once  dextrose 50% Injectable 25 Gram(s) IV Push once  DULoxetine 30 milliGRAM(s) Oral daily  gabapentin 800 milliGRAM(s) Oral every 8 hours  glucagon  Injectable 1 milliGRAM(s) IntraMuscular once  insulin lispro (ADMELOG) corrective regimen sliding scale   SubCutaneous three times a day before meals  insulin lispro (ADMELOG) corrective regimen sliding scale   SubCutaneous at bedtime  lactated ringers. 1000 milliLiter(s) IV Continuous <Continuous>  lidocaine   4% Patch 1 Patch Transdermal daily  lidocaine   4% Patch 1 Patch Transdermal daily  melatonin 3 milliGRAM(s) Oral at bedtime  multivitamin 1 Tablet(s) Oral daily  mupirocin 2% Ointment 1 Application(s) Topical two times a day  pantoprazole    Tablet 40 milliGRAM(s) Oral before breakfast  piperacillin/tazobactam IVPB.. 3.375 Gram(s) IV Intermittent every 8 hours  polyethylene glycol 3350 17 Gram(s) Oral daily  QUEtiapine 25 milliGRAM(s) Oral at bedtime  senna 2 Tablet(s) Oral at bedtime  sevelamer carbonate 800 milliGRAM(s) Oral three times a day with meals      PRN INPATIENT MEDICATION  acetaminophen     Tablet .. 650 milliGRAM(s) Oral every 6 hours PRN  albuterol/ipratropium for Nebulization 3 milliLiter(s) Nebulizer every 6 hours PRN  cyclobenzaprine 5 milliGRAM(s) Oral three times a day PRN  dextrose Oral Gel 15 Gram(s) Oral once PRN  HYDROmorphone  Injectable 0.5 milliGRAM(s) IV Push every 6 hours PRN        Vital signs:  T(C): 36.7 (04-10-25 @ 08:00), Max: 37.2 (04-09-25 @ 16:00)  HR: 112 (04-10-25 @ 11:17) (106 - 117)  BP: 139/77 (04-10-25 @ 08:00) (135/86 - 151/98)  RR: 18 (04-10-25 @ 08:00) (18 - 23)  SpO2: 98% (04-10-25 @ 11:17) (96% - 100%)  Wt(kg): 197.1 kg (04-)        04-09-25 @ 07:01  -  04-10-25 @ 07:00  --------------------------------------------------------  IN: 0 mL / OUT: 4500 mL / NET: -4500 mL      Constitutional: NAD, A&OX3. Morbidly Obese. Patient able to assist with turning bilaterally.  (+) bariatric low airloss support surface, (+) fluidized positioning devices, bariatric seat cushion on chair, heels slightly offloaded with pillow  HEENT: NC/AT, non icteric, mucosa moist. Tracheostomy site with small gauze and tegaderm dressing c/d/i  Cardiovascular: tachycardic   Respiratory: supplemental oxygen via NC, nonlabored, equal chest expansion.  Gastrointestinal: soft NT/ND. (+) PEG, peristomal intact.  Increase moisture beneath ABD pannus and bilateral groin, right groin denuded epidermis 0bdx4yba7.1cm- pink moist dermis, no drainage, periwound intact, no edema, no erythema, no increased warmth, no induration, no fluctuance, no crepitus.  No BM during exam.  : penile pouch in place, seal intact.  Musculoskeletal:  aROM to b/l UE, b/l le limited aROM, requires 1 person assistance with turning and positioning, no gross deformities or contractures.  Vascular: Deferred, to Podiatry, b/l feet dressings c/d/i.  Skin:  moist w/ good turgor.   Intertriginous folds of posterior trunk along bilateral flank increased moisture, skin intact- linear hyperpigmentation resolved.  Bilateral gluteal folds with denuded epidermis within intertriginous folds <5hox4qqs5.1cm, exposing pink moist dermis, periwound skin intact.  Sacrum/gluteal cleft and bilateral buttocks Stage 4 complicated by moisture and incontinence (pt turned to right side)  -Sacral/gluteal cleft- 8.5cmx3.9rqk0zb (stable), with digital probing and use of cotton tip,  loculations were broken revealing deeper tunnels at 11 o'clock extending 8cm and 12-1 o'clock extending at least 13cm, towards right posterior iliac crest, undermining at 2-3 o'clock extending 4cm (prev 3.5cm).  -Tissue type 30% tan-grey firmly attached nonfluctuant softening slough, surrounding pink-red moist subcutaneous base and granulation friable with cleansing, unable to palpable bone due to body habitus.  -Copious seropurulent drainage expressed from deep tunnel from 12-1o'clock, no odor.   -Shallow ulcerations to bilateral buttocks extending from wound edge:   Right buttock shallow ulceration- 8.5cmx6.5cmx0.2cm (stable), satellite denuded epidermis 3.5cm from distal edge 9joq4rmt4.2cm (stable).  Left buttock shallow ulceration- 1.5cmx2.5cmx0.2cm (stable)  Tissue type of satellite ulcerations 100% pink-moist dermis with scattered fibrinous exudate and re-epithelialization migrating from wound edges.  Periwound skin with area of slowly resolving nonfluctuant induration, no longer well defined, extending from right buttock satellite ulceration from 12-3 o'clock unable to measure, remaining periwound skin without obvious erythema, no edema, no increased warmth, no induration, no fluctuance, no crepitus.          LABS/ CULTURES/ RADIOLOGY:              7.9    11.05 >-----------<  173      [04-10-25 @ 06:55]              27.0     135  |  93  |  13  ----------------------------<  92      [04-10-25 @ 06:55]  4.7   |  26  |  1.74        Ca     9.1     [04-10-25 @ 06:55]      Mg     1.90     [04-10-25 @ 06:55]      Phos  6.3     [04-10-25 @ 06:55]                CAPILLARY BLOOD GLUCOSE      POCT Blood Glucose.: 106 mg/dL (10 Apr 2025 11:19)  POCT Blood Glucose.: 101 mg/dL (10 Apr 2025 07:50)  POCT Blood Glucose.: 103 mg/dL (09 Apr 2025 22:39)  POCT Blood Glucose.: 105 mg/dL (09 Apr 2025 17:00)        A1C with Estimated Average Glucose Result: 6.7 % (02-25-25 @ 05:44)        Culture - Blood (collected 04-07-25 @ 07:06)  Source: Blood Blood-Venous  Preliminary Report (04-10-25 @ 09:01):    No growth at 72 Hours    Culture - Blood (collected 04-07-25 @ 06:55)  Source: Blood Blood-Peripheral  Preliminary Report (04-10-25 @ 09:01):    No growth at 72 Hours          ACC: [id number] EXAM:  NM MULTI DAY PROCEDURE   ORDERED BY: CRYSTAL PATEL     ACC: [id number] EXAM:  NM INFLAMM LOC WBC WB SD   ORDERED BY: CRYSTAL PATEL     PROCEDURE DATE:  04/08/2025          INTERPRETATION:  HISTORY/REASON FOR EXAM: 39-year-old male with shortness   of breath, lower extremity edema and fungemia. Referred for evaluation of   focal infection.    RADIOPHARMACEUTICAL: 530 uCi In-111 labeled autologous leukocytes.    TECHNIQUE: Approximately 18-20 hours following administration of 0.553    mCi In-111 labeled autologous leukocytes, whole body imaging in anterior   and posterior views was performed. Additional static images of both   femurs and both feet were obtained.  SPECT of the pelvis was planned but   could not be performed due to technical limitations to accommodate   patient's habitus and only anterior and posterior static images of the   pelvis could be obtained.    COMPARISON: CT chest, abdomen and pelvis 04/03/2025.    SCINTIGRAPHIC FINDINGS: Increased activity could be seen in the right   right side of the pelvis more intense on posterior image but could also   be seen on the anterior image indicating a considerable area of soft   tissue infection. There is also slightly more intense activity in the   right iliac bone compared to the left raising possibility of   osteomyelitis.    Intense activity around the lateral aspect of the right forefoot   compatible with soft tissue infection, presence of complicating   osteomyelitis is not excluded. Milder activity in the medial aspect of   the left forefoot may reflect soft tissue infection. Please correlate   findings with clinical examination.    No focal abnormal activity in the lungs that is suggestive of pneumonia.    IMPRESSION:  1. Large area of soft tissue infection in the right side of the pelvis as   detailed above. Slightly more intense activity in the right iliac bone   compared to the left raises possibility of osteomyelitis.    2. Intense activity in the lateral aspect of the right forefoot   compatible with soft tissue infection; complicating osteomyelitis is not   excluded. Milder activity in the medial aspect of the left forefoot may   reflect soft tissue infection. Please correlate findings with clinical   examination.    3.No evidence of pneumonia.    --- End of Report ---            RADHA GOMEZ MD; Attending Radiologist  This document has been electronically signed. Apr 8 2025  4:49PM

## 2025-04-10 NOTE — PROGRESS NOTE ADULT - ASSESSMENT
This is a 40 y/o M w/ PMHx AF (not on AC), HTN, BEA, pseudotumor cerebri s/p LP in 2024, initially presented to [facility] on 2/24, SOB, LE edema with URI symptoms and sick contacts, noted to have severe ARDS, intubated however still hypoxia, cannulated for VV ECMO on 2/25, transferred to [facility] on 2/25, started on empiric Vanco, Zosyn for multifocal PNA, abdominal wall cellulitis, s/p trach placement by CT surgery on 3/7, ECMO decannulated on 3/7. Zosyn held on 3/9.  C/b fevers on 3/10, restarted on Zosyn, transferred to [facility] on 3/13, Bcx now w/ yeast.    #Candida albicans fungemia   #Fevers   #Multifocal PNA, abdominal wall cellulitis s/p Vanco/Zosyn  #ARDS, hypoxic respiratory failure requiring VV EMCO 2/2 multifocal PNA? s/p decannulation on 3/7    Overall, 40 y/o M w/ PMHx AF (not on AC), HTN, BEA, pseudotumor cerebri s/p LP in 2024 admitted to [facility] on 2/26 for ARDS, hypoxic respiratory failure requiring VV EMCO 2/2 multifocal PNA? s/p decannulation on 3/7, c/b abdominal wall cellulitis s/p Vancomycin/Zosyn, s/p trach on 3/7, in the setting of persistent fevers despite Zosyn, BCx now w/ yeast, Candida albicans   Unclear source for yeast in BCx, pt had all central lines removed on 3/7, not getting TPN, no abdominal pain on exam, PEG site well appearing.     CT A/P w/o clear abdominal source of fungemia, possibly 2/2 abdominal wall cellulitis? TTE w/o IE.  BCx 3/18 1/2 positive, 3/20 NGTD x 2.     Pt with worsening sepsis on 3/30, febrile to 103, WBC 16.   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   Pt with deep tunneled wound to left, no drainage, malodor per wound care.   CT A/P w/ b/l pulmonary opacities but improved, no sacral abscess   WBC scan with soft tissue infection R side of pelvis, intense activity in R iliac bone possible OM. Lateral R forefoot with soft tissue infection, possible OM?  Wound examined with wound care, large R buttocks soft tissue ulcer, does not look infected, however deep tunneling with murky fluid, likely source of infection     Recommendations;   1. Continue with Zosyn 3.375 g 8, plan on 6 week course for possible OM until 5/11  2. Caspofungin 70 mg daily, will plan on 4 week course ending 4/15  3. Obtain MRI pelvis to eval for OM  4. F/u wound care recs    Thank you for consulting us and involving us in the management of this patient's case. In addition to reviewing history, imaging, documents, labs, microbiology, and infection control strategies and potential issues.     ID will continue to follow    Shailesh Owens M.D.  Attending Physician  Division of Infectious Diseases  Department of Medicine    Please contact through MS Teams message.  Office: [phone] (after 5 PM or weekend)

## 2025-04-10 NOTE — PROGRESS NOTE ADULT - NS ATTEND AMEND GEN_ALL_CORE FT
Pt seen and examined with ACP.  Assessment and plan reviewed and discussed.  Agree with above.    Status of wounds and treatment recommendations d/w  pt and pt's father at bedside. .  All questions answered.   Pt and father expressed understanding.    I spent  50 minutes face to face w/ this pt of which more than 50% of the time was spent counseling & coordinating care of this pt.

## 2025-04-11 LAB
ANION GAP SERPL CALC-SCNC: 11 MMOL/L — SIGNIFICANT CHANGE UP (ref 7–14)
BASOPHILS # BLD AUTO: 0.04 K/UL — SIGNIFICANT CHANGE UP (ref 0–0.2)
BASOPHILS NFR BLD AUTO: 0.4 % — SIGNIFICANT CHANGE UP (ref 0–2)
BUN SERPL-MCNC: 14 MG/DL — SIGNIFICANT CHANGE UP (ref 7–23)
CALCIUM SERPL-MCNC: 9.3 MG/DL — SIGNIFICANT CHANGE UP (ref 8.4–10.5)
CHLORIDE SERPL-SCNC: 96 MMOL/L — LOW (ref 98–107)
CO2 SERPL-SCNC: 30 MMOL/L — SIGNIFICANT CHANGE UP (ref 22–31)
CREAT SERPL-MCNC: 1.65 MG/DL — HIGH (ref 0.5–1.3)
EGFR: 55 ML/MIN/1.73M2 — LOW
EGFR: 55 ML/MIN/1.73M2 — LOW
EOSINOPHIL # BLD AUTO: 0.22 K/UL — SIGNIFICANT CHANGE UP (ref 0–0.5)
EOSINOPHIL NFR BLD AUTO: 2.2 % — SIGNIFICANT CHANGE UP (ref 0–6)
GLUCOSE BLDC GLUCOMTR-MCNC: 101 MG/DL — HIGH (ref 70–99)
GLUCOSE BLDC GLUCOMTR-MCNC: 105 MG/DL — HIGH (ref 70–99)
GLUCOSE BLDC GLUCOMTR-MCNC: 105 MG/DL — HIGH (ref 70–99)
GLUCOSE BLDC GLUCOMTR-MCNC: 107 MG/DL — HIGH (ref 70–99)
GLUCOSE SERPL-MCNC: 92 MG/DL — SIGNIFICANT CHANGE UP (ref 70–99)
HCT VFR BLD CALC: 28.7 % — LOW (ref 39–50)
HGB BLD-MCNC: 8.1 G/DL — LOW (ref 13–17)
IANC: 7.57 K/UL — HIGH (ref 1.8–7.4)
IMM GRANULOCYTES NFR BLD AUTO: 0.5 % — SIGNIFICANT CHANGE UP (ref 0–0.9)
LYMPHOCYTES # BLD AUTO: 1.3 K/UL — SIGNIFICANT CHANGE UP (ref 1–3.3)
LYMPHOCYTES # BLD AUTO: 12.8 % — LOW (ref 13–44)
MAGNESIUM SERPL-MCNC: 1.8 MG/DL — SIGNIFICANT CHANGE UP (ref 1.6–2.6)
MCHC RBC-ENTMCNC: 24.6 PG — LOW (ref 27–34)
MCHC RBC-ENTMCNC: 28.2 G/DL — LOW (ref 32–36)
MCV RBC AUTO: 87.2 FL — SIGNIFICANT CHANGE UP (ref 80–100)
MONOCYTES # BLD AUTO: 0.98 K/UL — HIGH (ref 0–0.9)
MONOCYTES NFR BLD AUTO: 9.6 % — SIGNIFICANT CHANGE UP (ref 2–14)
NEUTROPHILS # BLD AUTO: 7.57 K/UL — HIGH (ref 1.8–7.4)
NEUTROPHILS NFR BLD AUTO: 74.5 % — SIGNIFICANT CHANGE UP (ref 43–77)
NRBC # BLD AUTO: 0 K/UL — SIGNIFICANT CHANGE UP (ref 0–0)
NRBC # FLD: 0 K/UL — SIGNIFICANT CHANGE UP (ref 0–0)
NRBC BLD AUTO-RTO: 0 /100 WBCS — SIGNIFICANT CHANGE UP (ref 0–0)
PHOSPHATE SERPL-MCNC: 6 MG/DL — HIGH (ref 2.5–4.5)
PLATELET # BLD AUTO: 469 K/UL — HIGH (ref 150–400)
POTASSIUM SERPL-MCNC: 4 MMOL/L — SIGNIFICANT CHANGE UP (ref 3.5–5.3)
POTASSIUM SERPL-SCNC: 4 MMOL/L — SIGNIFICANT CHANGE UP (ref 3.5–5.3)
RBC # BLD: 3.29 M/UL — LOW (ref 4.2–5.8)
RBC # FLD: 22.2 % — HIGH (ref 10.3–14.5)
SODIUM SERPL-SCNC: 137 MMOL/L — SIGNIFICANT CHANGE UP (ref 135–145)
WBC # BLD: 10.16 K/UL — SIGNIFICANT CHANGE UP (ref 3.8–10.5)
WBC # FLD AUTO: 10.16 K/UL — SIGNIFICANT CHANGE UP (ref 3.8–10.5)

## 2025-04-11 PROCEDURE — 72197 MRI PELVIS W/O & W/DYE: CPT | Mod: 26

## 2025-04-11 PROCEDURE — G0545: CPT

## 2025-04-11 PROCEDURE — 99232 SBSQ HOSP IP/OBS MODERATE 35: CPT

## 2025-04-11 RX ORDER — PIPERACILLIN-TAZO-DEXTROSE,ISO 2.25G/50ML
3.38 IV SOLUTION, PIGGYBACK PREMIX FROZEN(ML) INTRAVENOUS EVERY 8 HOURS
Refills: 0 | Status: DISCONTINUED | OUTPATIENT
Start: 2025-04-11 | End: 2025-04-25

## 2025-04-11 RX ORDER — HEPARIN SODIUM 1000 [USP'U]/ML
2400 INJECTION INTRAVENOUS; SUBCUTANEOUS
Qty: 25000 | Refills: 0 | Status: DISCONTINUED | OUTPATIENT
Start: 2025-04-12 | End: 2025-04-12

## 2025-04-11 RX ORDER — HEPARIN SODIUM 1000 [USP'U]/ML
10000 INJECTION INTRAVENOUS; SUBCUTANEOUS EVERY 6 HOURS
Refills: 0 | Status: DISCONTINUED | OUTPATIENT
Start: 2025-04-12 | End: 2025-04-12

## 2025-04-11 RX ORDER — ONDANSETRON HCL/PF 4 MG/2 ML
4 VIAL (ML) INJECTION ONCE
Refills: 0 | Status: COMPLETED | OUTPATIENT
Start: 2025-04-11 | End: 2025-04-11

## 2025-04-11 RX ORDER — HEPARIN SODIUM 1000 [USP'U]/ML
5000 INJECTION INTRAVENOUS; SUBCUTANEOUS EVERY 6 HOURS
Refills: 0 | Status: DISCONTINUED | OUTPATIENT
Start: 2025-04-12 | End: 2025-04-12

## 2025-04-11 RX ADMIN — Medication 4 MILLIGRAM(S): at 18:18

## 2025-04-11 RX ADMIN — Medication 0.5 MILLIGRAM(S): at 22:47

## 2025-04-11 RX ADMIN — CLOTRIMAZOLE 1 APPLICATION(S): 1 CREAM TOPICAL at 05:04

## 2025-04-11 RX ADMIN — Medication 0.5 MILLIGRAM(S): at 22:10

## 2025-04-11 RX ADMIN — Medication 25 GRAM(S): at 22:06

## 2025-04-11 RX ADMIN — APIXABAN 5 MILLIGRAM(S): 2.5 TABLET, FILM COATED ORAL at 18:01

## 2025-04-11 RX ADMIN — SEVELAMER HYDROCHLORIDE 800 MILLIGRAM(S): 800 TABLET ORAL at 12:37

## 2025-04-11 RX ADMIN — Medication 3 MILLIGRAM(S): at 22:07

## 2025-04-11 RX ADMIN — POLYETHYLENE GLYCOL 3350 17 GRAM(S): 17 POWDER, FOR SOLUTION ORAL at 11:16

## 2025-04-11 RX ADMIN — LIDOCAINE HYDROCHLORIDE 1 PATCH: 20 JELLY TOPICAL at 11:14

## 2025-04-11 RX ADMIN — Medication 0.5 MILLIGRAM(S): at 10:09

## 2025-04-11 RX ADMIN — AMLODIPINE BESYLATE 10 MILLIGRAM(S): 10 TABLET ORAL at 05:04

## 2025-04-11 RX ADMIN — GABAPENTIN 800 MILLIGRAM(S): 400 CAPSULE ORAL at 05:02

## 2025-04-11 RX ADMIN — CASPOFUNGIN ACETATE 260 MILLIGRAM(S): 5 INJECTION, POWDER, LYOPHILIZED, FOR SOLUTION INTRAVENOUS at 05:02

## 2025-04-11 RX ADMIN — MUPIROCIN CALCIUM 1 APPLICATION(S): 20 CREAM TOPICAL at 18:18

## 2025-04-11 RX ADMIN — GABAPENTIN 800 MILLIGRAM(S): 400 CAPSULE ORAL at 22:10

## 2025-04-11 RX ADMIN — APIXABAN 5 MILLIGRAM(S): 2.5 TABLET, FILM COATED ORAL at 05:02

## 2025-04-11 RX ADMIN — Medication 25 GRAM(S): at 14:03

## 2025-04-11 RX ADMIN — SEVELAMER HYDROCHLORIDE 800 MILLIGRAM(S): 800 TABLET ORAL at 18:11

## 2025-04-11 RX ADMIN — LIDOCAINE HYDROCHLORIDE 1 PATCH: 20 JELLY TOPICAL at 11:15

## 2025-04-11 RX ADMIN — Medication 1 TABLET(S): at 11:16

## 2025-04-11 RX ADMIN — SODIUM HYPOCHLORITE 1 APPLICATION(S): 0.12 SOLUTION TOPICAL at 05:03

## 2025-04-11 RX ADMIN — GABAPENTIN 800 MILLIGRAM(S): 400 CAPSULE ORAL at 14:08

## 2025-04-11 RX ADMIN — SEVELAMER HYDROCHLORIDE 800 MILLIGRAM(S): 800 TABLET ORAL at 07:49

## 2025-04-11 RX ADMIN — Medication 25 GRAM(S): at 05:03

## 2025-04-11 RX ADMIN — CLOTRIMAZOLE 1 APPLICATION(S): 1 CREAM TOPICAL at 18:01

## 2025-04-11 RX ADMIN — QUETIAPINE FUMARATE 25 MILLIGRAM(S): 25 TABLET ORAL at 22:07

## 2025-04-11 RX ADMIN — Medication 40 MILLIGRAM(S): at 05:02

## 2025-04-11 RX ADMIN — MUPIROCIN CALCIUM 1 APPLICATION(S): 20 CREAM TOPICAL at 05:04

## 2025-04-11 RX ADMIN — Medication 0.5 MILLIGRAM(S): at 12:31

## 2025-04-11 RX ADMIN — Medication 1 APPLICATION(S): at 05:03

## 2025-04-11 RX ADMIN — SODIUM HYPOCHLORITE 1 APPLICATION(S): 0.12 SOLUTION TOPICAL at 18:17

## 2025-04-11 RX ADMIN — DULOXETINE 30 MILLIGRAM(S): 20 CAPSULE, DELAYED RELEASE ORAL at 11:15

## 2025-04-11 NOTE — ADVANCED PRACTICE NURSE CONSULT - ASSESSMENT
Patient is aware and alert. Midline insertion along with risks, benefits, possible complications and infection prevention explained to patient who verbalized understanding. All questions addressed and patient gave verbal consent to place midline. Left arm cleansed with CHG. Using sterile technique under ultra sound guidance, placed PowerGlide Pro Midline 20G /10cm into Left Cephalic vein. Brisk blood return and flushed with 20Mls of normal saline. Minimal blood loss and patient tolerated procedure well. CHG dressing placed. All sharps accounted for. Report given to district RN and ordering provider. LOT#: [id number] , REF#: [id number]

## 2025-04-11 NOTE — PROGRESS NOTE ADULT - SUBJECTIVE AND OBJECTIVE BOX
CHIEF COMPLAINT: Patient is a 39y old  Male who presents with a chief complaint of sob     INTERVAL EVENTS: No acute events overnight.    REVIEW OF SYSTEMS: Seen and evaluated by bedside during AM rounds.          OBJECTIVE:  ICU Vital Signs Last 24 Hrs  T(C): 36.6 (10 Apr 2025 16:24), Max: 36.7 (10 Apr 2025 08:00)  T(F): 97.8 (10 Apr 2025 16:24), Max: 98 (10 Apr 2025 08:00)  HR: 104 (11 Apr 2025 03:06) (104 - 114)  BP: 133/83 (10 Apr 2025 16:24) (133/83 - 139/77)  BP(mean): 96 (10 Apr 2025 16:24) (96 - 96)  ABP: --  ABP(mean): --  RR: 18 (10 Apr 2025 18:42) (18 - 18)  SpO2: 100% (11 Apr 2025 03:06) (96% - 100%)    O2 Parameters below as of 10 Apr 2025 18:42  Patient On (Oxygen Delivery Method): nasal cannula  O2 Flow (L/min): 2            04-09 @ 07:01  -  04-10 @ 07:00  --------------------------------------------------------  IN: 0 mL / OUT: 4500 mL / NET: -4500 mL    04-10 @ 07:01  -  04-11 @ 06:59  --------------------------------------------------------  IN: 500 mL / OUT: 3500 mL / NET: -3000 mL      CAPILLARY BLOOD GLUCOSE      POCT Blood Glucose.: 107 mg/dL (10 Apr 2025 23:08)      HOSPITAL MEDICATIONS:  MEDICATIONS  (STANDING):  amLODIPine   Tablet 10 milliGRAM(s) Oral daily  apixaban 5 milliGRAM(s) Oral two times a day  caspofungin IVPB 70 milliGRAM(s) IV Intermittent every 24 hours  chlorhexidine 2% Cloths 1 Application(s) Topical <User Schedule>  clotrimazole 1% Cream 1 Application(s) Topical two times a day  Dakins Solution - 1/4 Strength 1 Application(s) Topical two times a day  dextrose 50% Injectable 25 Gram(s) IV Push once  dextrose 50% Injectable 12.5 Gram(s) IV Push once  dextrose 50% Injectable 25 Gram(s) IV Push once  DULoxetine 30 milliGRAM(s) Oral daily  gabapentin 800 milliGRAM(s) Oral every 8 hours  glucagon  Injectable 1 milliGRAM(s) IntraMuscular once  insulin lispro (ADMELOG) corrective regimen sliding scale   SubCutaneous three times a day before meals  insulin lispro (ADMELOG) corrective regimen sliding scale   SubCutaneous at bedtime  lactated ringers. 1000 milliLiter(s) (100 mL/Hr) IV Continuous <Continuous>  lidocaine   4% Patch 1 Patch Transdermal daily  lidocaine   4% Patch 1 Patch Transdermal daily  melatonin 3 milliGRAM(s) Oral at bedtime  multivitamin 1 Tablet(s) Oral daily  mupirocin 2% Ointment 1 Application(s) Topical two times a day  pantoprazole    Tablet 40 milliGRAM(s) Oral before breakfast  piperacillin/tazobactam IVPB.. 3.375 Gram(s) IV Intermittent every 8 hours  polyethylene glycol 3350 17 Gram(s) Oral daily  QUEtiapine 25 milliGRAM(s) Oral at bedtime  senna 2 Tablet(s) Oral at bedtime  sevelamer carbonate 800 milliGRAM(s) Oral three times a day with meals    MEDICATIONS  (PRN):  acetaminophen     Tablet .. 650 milliGRAM(s) Oral every 6 hours PRN Temp greater or equal to 38C (100.4F), Mild Pain (1 - 3), Moderate Pain (4 - 6)  albuterol/ipratropium for Nebulization 3 milliLiter(s) Nebulizer every 6 hours PRN Shortness of Breath and/or Wheezing  cyclobenzaprine 5 milliGRAM(s) Oral three times a day PRN Muscle Spasm  dextrose Oral Gel 15 Gram(s) Oral once PRN Blood Glucose LESS THAN 70 milliGRAM(s)/deciliter  HYDROmorphone  Injectable 0.5 milliGRAM(s) IV Push every 6 hours PRN dressing changes and severe breakthrough      PHYSICAL EXAMINATION  General: Alert and cooperative. Resting comfortably on recliner chair. No apparent distress noted. + Family at bedside.  HEENT: Normocephalic/atraumatic.   Cardiovascular: Regular rhythm. + Sinus tachycardia    Respiratory: + Trach to vent (CPAP 12/6/40%). Breath sounds clear w/ diminished bases bilaterally without wheezing. No accessory muscles used.   Abdomen: + Central obesity. + PEG c/d/i. Soft, nontender, nondistended.   Skin: Warm to touch.   Extremities: + Full range of motion on upper extremities. Unable to move lower extremities. B/L lower extremities wrapped with gauze  No clubbing, cyanosis, edema, or varicose veins.   Neuro: AxO x 3. Follow commands.       LABS:                        7.9    11.05 )-----------( 173      ( 10 Apr 2025 06:55 )             27.0     04-10    135  |  93[L]  |  13  ----------------------------<  92  4.7   |  26  |  1.74[H]    Ca    9.1      10 Apr 2025 06:55  Phos  6.3     04-10  Mg     1.90     04-10        Urinalysis Basic - ( 10 Apr 2025 06:55 )    Color: x / Appearance: x / SG: x / pH: x  Gluc: 92 mg/dL / Ketone: x  / Bili: x / Urobili: x   Blood: x / Protein: x / Nitrite: x   Leuk Esterase: x / RBC: x / WBC x   Sq Epi: x / Non Sq Epi: x / Bacteria: x            PAST MEDICAL & SURGICAL HISTORY:  HTN (hypertension)      Atrial fibrillation  paroxysmal      Papilledema of both eyes      Chronic sinusitis      Morbid obesity      No significant past surgical history          FAMILY HISTORY:      Social History:  sexually active with female partner occasionally (26 Feb 2025 01:48)      RADIOLOGY:  [ ] Reviewed and interpreted by me    PULMONARY FUNCTION TESTS:    EKG: CHIEF COMPLAINT: Patient is a 39y old  Male who presents with a chief complaint of sob     INTERVAL EVENTS: No acute events overnight.    REVIEW OF SYSTEMS: Seen and evaluated by bedside during AM rounds. Patient endorses constant B/L lower extremity pain.         OBJECTIVE:  ICU Vital Signs Last 24 Hrs  T(C): 36.6 (10 Apr 2025 16:24), Max: 36.7 (10 Apr 2025 08:00)  T(F): 97.8 (10 Apr 2025 16:24), Max: 98 (10 Apr 2025 08:00)  HR: 104 (11 Apr 2025 03:06) (104 - 114)  BP: 133/83 (10 Apr 2025 16:24) (133/83 - 139/77)  BP(mean): 96 (10 Apr 2025 16:24) (96 - 96)  ABP: --  ABP(mean): --  RR: 18 (10 Apr 2025 18:42) (18 - 18)  SpO2: 100% (11 Apr 2025 03:06) (96% - 100%)    O2 Parameters below as of 10 Apr 2025 18:42  Patient On (Oxygen Delivery Method): nasal cannula  O2 Flow (L/min): 2            04-09 @ 07:01  -  04-10 @ 07:00  --------------------------------------------------------  IN: 0 mL / OUT: 4500 mL / NET: -4500 mL    04-10 @ 07:01 - 04-11 @ 06:59  --------------------------------------------------------  IN: 500 mL / OUT: 3500 mL / NET: -3000 mL      CAPILLARY BLOOD GLUCOSE      POCT Blood Glucose.: 107 mg/dL (10 Apr 2025 23:08)      HOSPITAL MEDICATIONS:  MEDICATIONS  (STANDING):  amLODIPine   Tablet 10 milliGRAM(s) Oral daily  apixaban 5 milliGRAM(s) Oral two times a day  caspofungin IVPB 70 milliGRAM(s) IV Intermittent every 24 hours  chlorhexidine 2% Cloths 1 Application(s) Topical <User Schedule>  clotrimazole 1% Cream 1 Application(s) Topical two times a day  Dakins Solution - 1/4 Strength 1 Application(s) Topical two times a day  dextrose 50% Injectable 25 Gram(s) IV Push once  dextrose 50% Injectable 12.5 Gram(s) IV Push once  dextrose 50% Injectable 25 Gram(s) IV Push once  DULoxetine 30 milliGRAM(s) Oral daily  gabapentin 800 milliGRAM(s) Oral every 8 hours  glucagon  Injectable 1 milliGRAM(s) IntraMuscular once  insulin lispro (ADMELOG) corrective regimen sliding scale   SubCutaneous three times a day before meals  insulin lispro (ADMELOG) corrective regimen sliding scale   SubCutaneous at bedtime  lactated ringers. 1000 milliLiter(s) (100 mL/Hr) IV Continuous <Continuous>  lidocaine   4% Patch 1 Patch Transdermal daily  lidocaine   4% Patch 1 Patch Transdermal daily  melatonin 3 milliGRAM(s) Oral at bedtime  multivitamin 1 Tablet(s) Oral daily  mupirocin 2% Ointment 1 Application(s) Topical two times a day  pantoprazole    Tablet 40 milliGRAM(s) Oral before breakfast  piperacillin/tazobactam IVPB.. 3.375 Gram(s) IV Intermittent every 8 hours  polyethylene glycol 3350 17 Gram(s) Oral daily  QUEtiapine 25 milliGRAM(s) Oral at bedtime  senna 2 Tablet(s) Oral at bedtime  sevelamer carbonate 800 milliGRAM(s) Oral three times a day with meals    MEDICATIONS  (PRN):  acetaminophen     Tablet .. 650 milliGRAM(s) Oral every 6 hours PRN Temp greater or equal to 38C (100.4F), Mild Pain (1 - 3), Moderate Pain (4 - 6)  albuterol/ipratropium for Nebulization 3 milliLiter(s) Nebulizer every 6 hours PRN Shortness of Breath and/or Wheezing  cyclobenzaprine 5 milliGRAM(s) Oral three times a day PRN Muscle Spasm  dextrose Oral Gel 15 Gram(s) Oral once PRN Blood Glucose LESS THAN 70 milliGRAM(s)/deciliter  HYDROmorphone  Injectable 0.5 milliGRAM(s) IV Push every 6 hours PRN dressing changes and severe breakthrough      PHYSICAL EXAMINATION  General: Alert and cooperative. Resting comfortably on edge of bed, working with PT. No apparent distress noted.   HEENT: Normocephalic/atraumatic.   Cardiovascular: Regular rhythm. + Sinus tachycardia    Respiratory: + NC. Breath sounds clear w/ diminished bases bilaterally without wheezing. No accessory muscles used.   Abdomen: + Central obesity. + PEG c/d/i. Soft, nontender, nondistended.   Skin: Warm to touch.   Extremities: + Full range of motion on upper extremities. Unable to move lower extremities. B/L lower extremities wrapped with gauze  No clubbing, cyanosis, edema, or varicose veins.   Neuro: AxO x 3. Follow commands.       LABS:                        7.9    11.05 )-----------( 173      ( 10 Apr 2025 06:55 )             27.0     04-10    135  |  93[L]  |  13  ----------------------------<  92  4.7   |  26  |  1.74[H]    Ca    9.1      10 Apr 2025 06:55  Phos  6.3     04-10  Mg     1.90     04-10        Urinalysis Basic - ( 10 Apr 2025 06:55 )    Color: x / Appearance: x / SG: x / pH: x  Gluc: 92 mg/dL / Ketone: x  / Bili: x / Urobili: x   Blood: x / Protein: x / Nitrite: x   Leuk Esterase: x / RBC: x / WBC x   Sq Epi: x / Non Sq Epi: x / Bacteria: x            PAST MEDICAL & SURGICAL HISTORY:  HTN (hypertension)      Atrial fibrillation  paroxysmal      Papilledema of both eyes      Chronic sinusitis      Morbid obesity      No significant past surgical history          FAMILY HISTORY:      Social History:  sexually active with female partner occasionally (26 Feb 2025 01:48)      RADIOLOGY:  [ ] Reviewed and interpreted by me    PULMONARY FUNCTION TESTS:    EKG:

## 2025-04-11 NOTE — PROGRESS NOTE ADULT - ASSESSMENT
This is a 40 y/o M w/ PMHx AF (not on AC), HTN, BEA, pseudotumor cerebri s/p LP in 2024, initially presented to [facility] on 2/24, SOB, LE edema with URI symptoms and sick contacts, noted to have severe ARDS, intubated however still hypoxia, cannulated for VV ECMO on 2/25, transferred to [facility] on 2/25, started on empiric Vanco, Zosyn for multifocal PNA, abdominal wall cellulitis, s/p trach placement by CT surgery on 3/7, ECMO decannulated on 3/7. Zosyn held on 3/9.  C/b fevers on 3/10, restarted on Zosyn, transferred to [facility] on 3/13, Bcx now w/ yeast.    #Candida albicans fungemia   #Fevers   #Multifocal PNA, abdominal wall cellulitis s/p Vanco/Zosyn  #ARDS, hypoxic respiratory failure requiring VV EMCO 2/2 multifocal PNA? s/p decannulation on 3/7    Overall, 40 y/o M w/ PMHx AF (not on AC), HTN, BEA, pseudotumor cerebri s/p LP in 2024 admitted to [facility] on 2/26 for ARDS, hypoxic respiratory failure requiring VV EMCO 2/2 multifocal PNA? s/p decannulation on 3/7, c/b abdominal wall cellulitis s/p Vancomycin/Zosyn, s/p trach on 3/7, in the setting of persistent fevers despite Zosyn, BCx now w/ yeast, Candida albicans   Unclear source for yeast in BCx, pt had all central lines removed on 3/7, not getting TPN, no abdominal pain on exam, PEG site well appearing.     CT A/P w/o clear abdominal source of fungemia, possibly 2/2 abdominal wall cellulitis? TTE w/o IE.  BCx 3/18 1/2 positive, 3/20 NGTD x 2.     Pt with worsening sepsis on 3/30, febrile to 103, WBC 16.   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   Pt with deep tunneled wound to left, no drainage, malodor per wound care.   CT A/P w/ b/l pulmonary opacities but improved, no sacral abscess   WBC scan with soft tissue infection R side of pelvis, intense activity in R iliac bone possible OM. Lateral R forefoot with soft tissue infection, possible OM?  Wound examined with wound care, large R buttocks soft tissue ulcer, does not look infected, however deep tunneling with murky fluid, likely source of infection     Recommendations;   1. Continue with Zosyn 3.375 g 8, plan on 6 week course for possible OM until 5/11  2. Caspofungin 70 mg daily, will plan on 4 week course ending 4/15  3. Obtain MRI pelvis to eval for OM  4. F/u wound care recs    Thank you for consulting us and involving us in the management of this patient's case. In addition to reviewing history, imaging, documents, labs, microbiology, and infection control strategies and potential issues.     ID will continue to follow    Shailesh Owens M.D.  Attending Physician  Division of Infectious Diseases  Department of Medicine    Please contact through MS Teams message.  Office: [phone] (after 5 PM or weekend)

## 2025-04-11 NOTE — PROGRESS NOTE ADULT - NS ATTEND AMEND GEN_ALL_CORE FT
Pt is a 39M with MHx obesity (Class III, BMI 69), pAfib (no AC), HTN, BEA (dz'ed 2019, AHI 34 non-compliant on CPAP set at 10), and history of b/l papilledema attributed to pseudotumor cerebri initially presenting as a transfer [facility] on 2/26 for acute hypoxemic respiratory failure s/p ETT intubation/MV with progression to refractory ARDS s/p initiation of vv-ECMO support (2/26-3/7) with failure to wean from ventilator s/p tracheostomy placement, further found to have RV failure s/p aggressive diuresis and PNA followed by severe sepsis 2/2 panniculitis c/b candida albicans fungemia now transferred to [facility] for further medical management.     Pt now awake and alert, spontaneously communicative and at mental status baseline. Continues to have severe functional debility likely 2/2 prolonged critical illness polyneuropathy but is progressing well. Now able to transfer via [place] to chair daily and is independent of UE and fine motor function. Off all sedation. Weaning pain control, pt remains dependent on Dilaudid prn for LE neuropathic pain. PO regimen added and gabapentin increased without benefit, now on 900mg TID. Dilaudid increased in frequency/dose given persistent uncontrolled pain with improvement. Pain mgmt team consulted, recs appreciated. Podiatry consulted, possible worsening of right 5th digit that may require podiatric sx. Will CTM for now. Pt now with NM WBC scan concerning for deep foot infection, does not appear infected as per podiatry team. Local wound care orders placed.     Pt with acute combined hypoxemic and hypercapnic respiratory failure with failure to wean from ventilator s/p tracheostomy placement. Pt was tolerating TC QD, PSV (18/10) qhs. Airway clearance therapy in place. Wean O2 supplementation for goal O2 saturation 90-95%. Aspiration precautions and oral hygiene in place. Decannulated at bedside 3/28, continues to use Bilevel NIV qhs. Serial ABGs wnl.     Pt initially p/w RV failure 2/2 pulmHTN now s/p aggressive diuresis with improvement. Now transitioned to PO diuretics prn with goal to maintain euvolemia. Acetazolamide added today. AFib well controlled conservatively, will continue to monitor on telemetry. TTE 2/25 with new RVE, 3/11 with RVSD. DVT noted on repeat duplex of the right IJ and bilateral peroneal veins. Will c/w full A/C on Eliquis for DVT and AFib. Will need to transition to heparin drip x48 hours prior to PEG removal 4/15.    Pt with oropharyngeal dysphagia s/p PEG placement (3/7) now transitioned to PO diet (3/20) via FEEST. Will continue to monitor on regular PO diet. Plan for PEG removal 4/15 with thoracic sx. Bowel regimen in place prn. PPI ppx. Transaminitis improving. Pt with newly diagnosed hepatomegaly, further mgmt as O/P.     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 TTE (-) endocarditis. Wound care team following. ID recs appreciated. Further found to have worsening sacral ulceration with induration, CT A/P and MRI spine performed without evidence of underlying tract or drainable fluid collection. Now with acute onset fevers, although without new s/s or complaints and with unclear source. Cx resent, empiric abx initiated with improvement but cx NTD. Fever curve downtrending. Wound care team evaluated and is concerned for worsening tunnelling of sacral wound but CT imaging (-) concern for osteomyelitis or occult infectious sources. NM WBC scan performed overnight concerning for active infection with possible osteomyelitis of right pelvis/iliac bone. Evaluated by wound care team again at bedside 4/10, recommended MRI of pelvis to evaluate extent of deep tunnelling. Pt will need long-term Abx ~4 weeks through 5/11 and completion of caspofungin 4/15.     Dispo pending medical optimization. Pt full code. DVT ppx in place. Palliative consulted, recs appreciated. Will continue to update family and discuss plan of care throughout hospitalization. Dispo planning to acute rehab initiated. declines

## 2025-04-11 NOTE — PROGRESS NOTE ADULT - ASSESSMENT
39M presents with R foot plantar forefoot hematoma/partial gangrene  - Patient seen and evaluated  - Right foot digits 4 & 5 partial thickness dry gangrene. Right foot plantar lateral forefoot wound to subQ, no malodor, no pus, no acute signs of infection. Left foot digit 1,2,4 partial thickness dry gangrene. Left foot lateral plantar forefoot wound to subQ, no malodor, no pus, no acute signs of infection.  - No source of infection concerns, no surgical concerns per podiatry  - No culture taken secondary to no acute signs of infection   - Wound care orders placed for daily dressing changes   - No acute pod intervention, local wound care only   - Wound care instruction and follow up information in discharge note provider

## 2025-04-11 NOTE — ADVANCED PRACTICE NURSE CONSULT - REASON FOR CONSULT
Midline placement for long term abx
Patient known to [facility] service line last seen on 3/3/2025. Patient seen on skin care rounds after wound care referral received for assessment of skin impairment and recommendations of topical management. As per H&P, patient is a 38 YO M with PMHx of morbid obesity, BEA on CPAP, pAFIB (not on AC), HTN, and BL papilledema attributed to pseudotumor cerebri who presented initially to [facility] on 2/24 with SOB and BL LE edema. Found with URI outpatient with progressive SOB, and concern for noted for MFPNA on imaging. Course progressed with AHRF with worsening O2 demand, respiratory distress, secretions, and somnolence requiring intubation (difficult with success after 6 attempts) in ED and admission to [facility] MICU. While in MICU, patient noted with increasing O2 demand with no improvement despite optimal vent management. Concern noted for progressive ARDS, unable to prone given morbid obesity, and ultimately cannulated for vvECMO on 2/25 and transferred to [facility] for further management on 2/26. While at [facility], tx with diuresis and ABX, and ultimately decannulated and s/p [id number] trach and PEG on 3/7. Patient catie transferred to [facility] on 3/11 and course complicated by recurrent high grade fevers and found with fungemia. Chart reviewed: WBC10.54, H/H 8.5/30.5, INR 1.88, Platelets 164, hA1c 6.7, BMI 69.4, ramin 12. 
Patient seen on skin care rounds after wound care referral received for assessment of skin impairment and recommendations of topical management. AS per H&P, patient is a 38 yo M w/ class III obesity, pAfib (no AC), HTN, BEA (on CPAP), history of b/l papilledema attributed to pseudotumor cerebri s/p LP in 2024 with very high opening pressure, who is transferred for VV ECMO for ARDS and severe hypoxia. Patient initially presented to [facility] on 2/24 for SOB and b/l LE edema. 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 He is a current every day smoker of [employer] and marijuana, but does not drink or do other drugs. Currently lives with his mother. There is a long term partner in his life, but they are not , and they have an on/off relationship currently not very involved with each other as per the patient's mother and aunt.  At [facility] where he was getting an eval last year for shortness of breath, he had a sleep study and was diagnosed with sleep apnea, prescribed a PAP machine, which he has in his room but the mother is not sure how many nights per week he uses it. Recently, the last day or two, his mother and brother have been under the weather with URIs and the patient 2 days ago started to develop a ruynny nose, ough, some wheezing of the chest, as well as worsening shortness of breath and fevers at home. He called his aunt who told him to go get checked out and then he landed at the [facility] ED. Patient found to be reportedly hypoxemic to 70% on RA with worsening respiratory status/secretions and somnolence in the ED. Patient was intubated with difficulty (7 attempts) due to very large tongue secretions. Despite optimal vent management, patient remained hypoxemic. Unable to prone due to obesity. Patient cannulated for VV ECMO on 2/25 and transferred to [facility] for further management. Chart reviewed: WBC 18.95, H/H 12.0/42.2, INR 2.85, Platelets 131, hA1c 6.7, BMI 83.1, ramin 10. 
Attempted to see patient on Wound Care Rounds for DTPI however patient unstable for turning at this time as per primary RN at bedside. AS per RN Silverio wound being managed with barrier cream as per nursing protocol. Will follow up with patient for wound care recommendations.

## 2025-04-11 NOTE — PROGRESS NOTE ADULT - SUBJECTIVE AND OBJECTIVE BOX
Infectious Diseases Follow Up:    Patient is a 39y old  Male who presents with a chief complaint of sob (02 Mar 2025 06:19)      Interval History/ROS:  No acute events, afebrile ON. Pt w/ b/l foot pain     Allergies  No Known Allergies        ANTIMICROBIALS:  caspofungin IVPB 70 every 24 hours  piperacillin/tazobactam IVPB.. 3.375 every 8 hours      Current Abx:     Previous Abx     OTHER MEDS:  MEDICATIONS  (STANDING):  acetaminophen     Tablet .. 650 every 6 hours PRN  albuterol/ipratropium for Nebulization 3 every 6 hours PRN  amLODIPine   Tablet 10 daily  apixaban 5 two times a day  cyclobenzaprine 5 three times a day PRN  dextrose 50% Injectable 25 once  dextrose 50% Injectable 12.5 once  dextrose 50% Injectable 25 once  dextrose Oral Gel 15 once PRN  DULoxetine 30 daily  gabapentin 800 every 8 hours  glucagon  Injectable 1 once  HYDROmorphone  Injectable 0.5 every 6 hours PRN  insulin lispro (ADMELOG) corrective regimen sliding scale  three times a day before meals  insulin lispro (ADMELOG) corrective regimen sliding scale  at bedtime  melatonin 3 at bedtime  pantoprazole    Tablet 40 before breakfast  polyethylene glycol 3350 17 daily  QUEtiapine 25 at bedtime  senna 2 at bedtime      Vital Signs Last 24 Hrs  T(C): 36.6 (10 Apr 2025 16:24), Max: 36.6 (10 Apr 2025 16:24)  T(F): 97.8 (10 Apr 2025 16:24), Max: 97.8 (10 Apr 2025 16:24)  HR: 101 (11 Apr 2025 07:53) (101 - 112)  BP: 133/83 (10 Apr 2025 16:24) (133/83 - 133/83)  BP(mean): 96 (10 Apr 2025 16:24) (96 - 96)  RR: 18 (10 Apr 2025 18:42) (18 - 18)  SpO2: 100% (11 Apr 2025 07:53) (96% - 100%)    Parameters below as of 10 Apr 2025 18:42  Patient On (Oxygen Delivery Method): nasal cannula  O2 Flow (L/min): 2      PHYSICAL EXAM:  GENERAL: NAD  HEAD:  Atraumatic, Normocephalic  EYES: EOMI, conjunctiva and sclera clear  CHEST/LUNG: On NC, CTAB  HEART: RRR  ABDOMEN: Soft, Nontender, Nondistended; sacral wound examined with wound care on 4/9, pt with large soft tissue ulcer on R buttocks, extensive tunneling to left buttocks, murky fluid noted   Extremities: B/l feet wrapped   PSYCH: AAOx3                          7.9    11.05 )-----------( 173      ( 10 Apr 2025 06:55 )             27.0       04-10    135  |  93[L]  |  13  ----------------------------<  92  4.7   |  26  |  1.74[H]    Ca    9.1      10 Apr 2025 06:55  Phos  6.3     04-10  Mg     1.90     04-10        Urinalysis Basic - ( 10 Apr 2025 06:55 )    Color: x / Appearance: x / SG: x / pH: x  Gluc: 92 mg/dL / Ketone: x  / Bili: x / Urobili: x   Blood: x / Protein: x / Nitrite: x   Leuk Esterase: x / RBC: x / WBC x   Sq Epi: x / Non Sq Epi: x / Bacteria: x        MICROBIOLOGY:  v  Blood Blood-Venous  04-07-25   No growth at 4 days  --  --      Blood Blood-Peripheral  04-07-25   No growth at 4 days  --  --      Clean Catch Clean Catch (Midstream)  03-31-25   <10,000 CFU/mL Normal Urogenital Kyle  --  --      Blood Blood-Peripheral  03-30-25   No growth at 5 days  --  --      Trach Asp Tracheal Aspirate  03-21-25   Commensal kyle consistent with body site  --    Few polymorphonuclear leukocytes per low power field  No Squamous epithelial cells per low power field  No organisms seen per oil power field      Blood Blood-Peripheral  03-20-25   No growth at 5 days  --  --      Blood Blood-Venous  03-20-25   No growth at 5 days  --  --      Blood Blood-Peripheral  03-18-25   No growth at 5 days  --  Blood Culture PCR  Candida albicans      Nares/Axilla/Groin Nares/Axilla/Groin  03-17-25   Culture NEGATIVE for Candida auris.  This surveillance culture is intended for Infection Control purposes only.  A negative result does not preclude the carriage of other fungal  organisms.  --  --      Blood Blood-Venous  03-16-25   Growth in aerobic bottle: Candida albicans  See previous culture [id number]  --    Growth in aerobic bottle: Yeast like cells      Blood Blood-Peripheral  03-16-25   Growth in aerobic bottle: Candida albicans  See previous culture [id number]  --    Growth in aerobic bottle: Yeast like cells      Trach Asp Tracheal Aspirate  03-16-25   Commensal kyle consistent with body site  --    Few polymorphonuclear leukocytes per low power field  Rare Squamous epithelial cells per low power field  Rare Gram Positive Rods seen per oil power field  Rare Gram Negative Rods seen per oil power field      Blood Blood-Venous  03-15-25   Growth in aerobic bottle: Candida albicans  See previous culture [id number]  --    Growth in aerobic bottle: Yeast like cells      Blood Blood-Peripheral  03-15-25   Growth in aerobic bottle: Candida albicans  Direct identification is available within approximately 3-5  hours either by Blood Panel Multiplexed PCR or Direct  MALDI-TOF. Details: [email or web address]  --  Blood Culture PCR  Candida albicans                RADIOLOGY:

## 2025-04-11 NOTE — PROGRESS NOTE ADULT - SUBJECTIVE AND OBJECTIVE BOX
Patient is a 39y old  Male who presents with a chief complaint of sob (02 Mar 2025 06:19)       INTERVAL HPI/OVERNIGHT EVENTS:  Patient seen and evaluated at bedside.  Pt is resting comfortable in NAD. Denies N/V/F/C.  Pain rated at X/10    Allergies    No Known Allergies    Intolerances        Vital Signs Last 24 Hrs  T(C): 36.6 (10 Apr 2025 16:24), Max: 36.6 (10 Apr 2025 16:24)  T(F): 97.8 (10 Apr 2025 16:24), Max: 97.8 (10 Apr 2025 16:24)  HR: 101 (11 Apr 2025 07:53) (101 - 112)  BP: 133/83 (10 Apr 2025 16:24) (133/83 - 133/83)  BP(mean): 96 (10 Apr 2025 16:24) (96 - 96)  RR: 18 (10 Apr 2025 18:42) (18 - 18)  SpO2: 100% (11 Apr 2025 07:53) (96% - 100%)    Parameters below as of 10 Apr 2025 18:42  Patient On (Oxygen Delivery Method): nasal cannula  O2 Flow (L/min): 2      LABS:                        7.9    11.05 )-----------( 173      ( 10 Apr 2025 06:55 )             27.0     04-10    135  |  93[L]  |  13  ----------------------------<  92  4.7   |  26  |  1.74[H]    Ca    9.1      10 Apr 2025 06:55  Phos  6.3     04-10  Mg     1.90     04-10        Urinalysis Basic - ( 10 Apr 2025 06:55 )    Color: x / Appearance: x / SG: x / pH: x  Gluc: 92 mg/dL / Ketone: x  / Bili: x / Urobili: x   Blood: x / Protein: x / Nitrite: x   Leuk Esterase: x / RBC: x / WBC x   Sq Epi: x / Non Sq Epi: x / Bacteria: x      CAPILLARY BLOOD GLUCOSE      POCT Blood Glucose.: 101 mg/dL (11 Apr 2025 07:42)  POCT Blood Glucose.: 107 mg/dL (10 Apr 2025 23:08)  POCT Blood Glucose.: 100 mg/dL (10 Apr 2025 16:48)  POCT Blood Glucose.: 106 mg/dL (10 Apr 2025 11:19)      Lower Extremity Physical Exam:  Vascular: DP 1/4 B/L, PT 0/4, B/L secondary to +3 pitting edema, CFT <3 seconds B/L, Temperature gradient warm to cool, B/L.   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 Right foot plantar lateral forefoot wound to subQ, no malodor, no pus, no acute signs of infection. Left foot digit 1,2,4 partial thickness dry gangrene. Left foot lateral plantar forefoot wound to subQ, no malodor, no pus, no acute signs of infection.    RADIOLOGY & ADDITIONAL TESTS:

## 2025-04-11 NOTE — CHART NOTE - NSCHARTNOTEFT_GEN_A_CORE
Plan for OR 4/15 for PEG removal with Dr Llanes  Will need Eliquis held for 72 hours (last dose 4/12 AM)  Will continue to monitor    Sophie Dorado  Department of Thoracic Surgery  [id number]

## 2025-04-11 NOTE — PROGRESS NOTE ADULT - ASSESSMENT
40 YO M with PMHx of morbid obesity, BEA on CPAP, pAFIB (not on AC), HTN, and BL papilledema attributed to pseudotumor cerebri who presented initially to [facility] on 2/24 with SOB and BL LE edema. Found with URI outpatient with progressive SOB, and concern for noted for MFPNA on imaging. Course progressed with AHRF with worsening O2 demand, respiratory distress, secretions, and somnolence requiring intubation (difficult with success after 6 attempts) in ED and admission to [facility] MICU. While in MICU, patient noted with increasing O2 demand with no improvement despite optimal vent management. Concern noted for progressive ARDS, unable to prone given morbid obesity, and ultimately cannulated for vvECMO on 2/25 and transferred to [facility] for further management on 2/26. While at [facility], tx with diuresis and ABX, and ultimately decannulated and s/p 60XLTCP trach and PEG on 3/7. Patient ultimately transferred to [facility] on 3/11 and course complicated by recurrent high grade fevers and found with fungemia.    NEUROLOGY  # Hx of Pseudotumor Cerebri   - s/p LP in 2024 with high opening pressure  - s/p MRI 2024 with possible pituitary mass but unclear because of pressure effect from pseudotumor cerebri causing partially empty sella.  - Prolactin elevated   - ACTH low but received steroids during admission   - FT4 low normal and TSH normal, but given FT4 low normal TSH should be higher   - Discuss endocrine consult for inhouse work up vs outpatient.     # Sedation   - Weaned off sedation in ICU   - Nimbex turned off 2/27   - Prop turned off 3/9   - Precedex turned off and catapres started 3/11  - catapres 0.2 TID (decreased 3/25) - tapered down 3/31 to 0.1 tid - discontinued 4/3     # Insomnia   - Patient worked night shift x 15 years   - Trouble sleeping at night leading to day time sleepiness and poor participation with PT  - Continue on Seroquel 25 QHS (increased 3/18)   - Continue on melatonin   - Continue on Neurontin as below    # BL LE neuropathy   - CIPN concern   - Neurontin increased to 300mg Q8H with relief   - added Percocet Q4H for moderate pain (3/25) and continue on Dilaudid Q6H for severe pain   - Pain mgt called to consult - recs for tylenol ATC, NEurontin increased to 400mg q6, Oxycodone 5 q4h prn , Dilaudid for BREAKTHROUGH pain only, Lidocaine patches on each leg, ice prn to feet  - Some improvement in pain - PT consult for TENS,   - oxycodone inc to 10mg as pt with no relief /buttocks area indurated /tender CT ordered -no abscess  - C/w Gabapentin : 600/600/800 and up titrate as required  - Switched to PO Dilaudid 2mg Q6H PRN moderate-severe and IV Dilaudid PRN severe breakthrough and with dressing changes   - Still with severe pain 10/10 despite change to Dilaudid will increase to 4mg q4h prn with iv Dilaudid breakthrough  - Neurontin increased to 800mg q8h Monitor neuro status/ lethargy   - I have reviewed with pt to not wait to long for dose of pain medication to  prevent pain from escalating   - Meds reviewed despite being in constant pain pt not taking Dilaudid as often as he can often with 10/10 pain Dilaudid will be every 4 hours with parameters and pt can refuse Will reconsult pain mgt again for any recommendations on Monday   - Pain management reconsulted , recs to stop Dilaudid PO ATC , continue Dilaudid for breakthrough pain with dressing changes, increase gabapentin to 900 mg Q8, add Cymbalta 30 mg QD and Flexeril 5mg Q8    CARDIOVASCULAR  # HTN   - HTN noted in ICU requiring cardene and esmolol GTTs   - Lisinopril dc'ed to increase catapres   - Continue on norvasc 10, catapres weaned off 4/3  - Monitor BP     # AFIB   - Hx of pAFIB   - No RVR episodes or pAFIB episodes in ICU   - No rate control medications needed  - Monitor on telemetry     # RV dilation second to PHTN   - POCUS on arrival to [facility] with RVE concern   - TTE 10/2024 with EF 55-60 with normal LVSF, moderate LVH and normal RVSF and size with no concern for pHTN   - TTE on 2/25 at  with EF 61 and normal LVSF, but enlarged RV size with reduced RVSF, mild TR, mild PHTN with PASP 49, and dilated IVC to 3.4cn, but normal TAPSE 2.1.  - Continue on aggressive diuresis in ICU    - TTE 3/11 with RVSF reduced with TAPSE 1.6. IVC improved to 2.12cm.   - Lasix 40 PO QD discontinued 4/7 secondary to ELLA  - Monitor IO/ BMP    RESPIRATORY  # ARDS second to MFPNA   - Hx of BEA on CPAP presented to  post URI with SOB and found with AHRF with progression to ARDS second to post viral MFPNA   - Intubated 2/25   - Cannulated for vvECMO 2/26   - s/p 60XLTCP tracheostomy 3/7   - Decannulated 3/7   - CTSx removed tracheostomy and ECMO sutures on 3/17   - Continue on TC QD with PMV as able with strong phonation  - Continue on PS 18/10/40 QHS given OHS   - Continue on nebs and HTS   -  trials continue and now trialing overnight with BIPAP for OHS (10/8/12)  - Decannulated 3/28   - Using nasal cannula 4L     GI  # Dysphagia   - s/p PEG 3/7   - Attempted green dye on 3/13 and failed  - Continue on TF  - Continue on miralax and senna  - RPT green dye passed 3/19  - 3/20 Passed FEEST - on soft bite sized with thin liquids Added consistent carb 2/2 weight/Elevated A1c   - plan for OR 4/15 for PEG removal with cardiothoracic surgery   - Hold Eliquis held for 72 hours last dose 4/12 AM - will begin Heparin drip on Saturday to bridge to procedure       # Mild transaminitis   - LFTs elevated in ICU with TBILI elevated likely from ECMO hemolysis   - US ABD with hepatic steatosis   - Trend LFTs      RENAL  # ELLA   - ELLA likely from cardiorenal with RVE   - Diuresed in ICU and improved   - Monitor renal function and UOP   - Recurrent ELLA noted suspected in s/o vancomycin toxicity (4/7)   - UA negative and Urine Studies FeNa 0.9 % suggest pre-renal etiology  - Lasix discontinued for now  - s/p IV fluid trial on 4/8 with minimal improvement  - Trialing additional fluids on 4/10   - Trend BMP and I/Os    # Hypernatremia (resolved)  - Hypernatremia with fever spikes   - Fever curve improved and attempting to wean FWF   - now off FWF    INFECTIOUS DISEASE  # Recurrent fevers with fungemia from unknown source   - Recurrent fevers post ECMO decannulation thought initially to be cytokine surge   - BCx, SCx, UCx, RVP and MRSA RPT on 3/12 negative   - Completed zosyn (3/12-3/18) empirically with improved WBC , however recurrent fevers noted.   - RPT SCx, UA, CXR and RVP negative  - TTE 3/17 with no IE  - PanCT 3/17 with PNA and persistent panniculitis  - BCx 3/15 and 3/16 with candida albicans, rpt BCx 3/18 and 3/20 negative   - Concern for possible source from panniculitis   - Continue on Caspofungin 70 mg daily 3/17 - 4/15, ID consult appreciated   - 3/30 spiked temp cx's sent + ua and c/s pending Bcx neg to date - started on vanco /zosyn ID following  - Vanco stopped (4/7) due to ELLA concern for Vanco toxicty and continued on empiric Zosyn/ Caspofungin   - 4/3 - persistent fevers, leukocytosis. Will repeat CT C/A/P to evaluate for source - no source found - Bilat lung opacities mildly decreased     - 4/8 bone scan: : "1. Large area of soft tissue infection in the R side of the pelvis as detailed above. Slightly more intense activity in the R iliac bone compared to the L raises possibility of OM. 2. Intense activity in the lateral aspect of the R forefoot compatible with soft tissue infection; complicating osteomyelitis is not excluded. Milder activity in the medial aspect of the L forefoot may reflect soft tissue infection."  - ID consult appreciated. Continue on Zosyn 3.375 g 8, plan on 6 week course for possible OM until 5/11 and Caspofungin 70 mg daily, will plan on 4 week course ending 4/15  - Obtain MRI pelvis to eval for OM pending    # MFPNA and abdominal pannus cellulitis   - Presented to  post URI with SOB and found with AHRF with progression to ARDS second to post viral MFPNA   - RVP, legionella, strept, BAL, BCx, UCx, HIV, PCP, and adenovirus PCR negative   - Initially started on multiple agents in ICU and ultimately completed zosyn (2/26-3/9) ZMAX (2/25-2/26) and vanco (2/26-3/1)     # R/o Sacral OM  - Noted with hard indurated sacral wound by Wound Care Team  - CT Pelvis (3/29) revealing superficial skin thickening overlying the sacrum with underlying edema of the subcutaneous fat, in keeping with the clinical history of sacral wound. No evidence of underlying tract or drainable fluid collection.  - CT 4/3 - no fluid collection in sacral tissue   - Given ongoing fever with no clear source with obtain MRI Pelvis per ID recs to r/o sacral OM once renal fx improves  - MR pelvis pending, antibiotics as above     # Penile discharge   - GC/ chlamydia negative   - HIV negative   - Monitor for now    # PPX   - MRSA PCR positive and completed bactroban (2/26-3/3)     HEME   # Anemia likely second to ECMO circuit vs AOCD   - HH low in ICU second to ECMO circuit   - HH dropping again today however no bleeding noted   - Microcytic and hemochromic, sent anemia panel 3/19  - Trend HH     VASCULAR   # R IJ and BL LE DVTs   - Post ECMO dopplers on 3/9 negative for BL UE/ LE DVTs.   - Subsequent post ECMO dopplers performed on 3/14 and noted with acute, non-occlusive deep vein thrombosis noted within the right internal jugular vein. Acute, occlusive deep vein thromboses noted within the right and left peroneal veins at both mid calves, and age-indeterminate, occlusive deep vein thrombosis visualized within the left gastrocnemius vein at the proximal calf.     - Continue on argatroban GTT and changed to Eliquis   -  last dose of Eliquis Saturday morning 4/12 in AM in preparation for PEG removal, plan to begin heparin drip     PODIATRY   # BL plantar feet blisters likely pressors induced  - Seen by podiatry and blisters lanced on 3/4 and again on 3/18  - Continue on local wound care per podiatry   - Podiatry following  - OK for WBAT will fu with PT for offloading shoe if appropriate  - Pain mgt consult   - Podiatry FU 3/28 / 4/3 done /following     ENDOCRINE  # Pre-DM2   - A1C 6.7  - Continue on ISS   - Monitor FS   - IF no demand then stop ISS     LINES/ TUBES  - R IJ and R FEM ECMO (2/26-3/7)     SKIN  # Pannus canndial intertrigo   # Sacrum/ buttock MAD  - Continue on clotrimazole cream   - WOC appreciated   -Seen by WC pt noted to have oozing from Buttock wound surgicel placed by wound care On follow up no further bleeding  - Wound care placed orders   - No bleeding from wound     ETHICS/ GOC    - FULL CODE     DISPO - PT following  Seen by PMR for acute rehab per recs   38 YO M with PMHx of morbid obesity, BEA on CPAP, pAFIB (not on AC), HTN, and BL papilledema attributed to pseudotumor cerebri who presented initially to [facility] on 2/24 with SOB and BL LE edema. Found with URI outpatient with progressive SOB, and concern for noted for MFPNA on imaging. Course progressed with AHRF with worsening O2 demand, respiratory distress, secretions, and somnolence requiring intubation (difficult with success after 6 attempts) in ED and admission to [facility] MICU. While in MICU, patient noted with increasing O2 demand with no improvement despite optimal vent management. Concern noted for progressive ARDS, unable to prone given morbid obesity, and ultimately cannulated for vvECMO on 2/25 and transferred to [facility] for further management on 2/26. While at [facility], tx with diuresis and ABX, and ultimately decannulated and s/p 60XLTCP trach and PEG on 3/7. Patient ultimately transferred to [facility]CU on 3/11 and course complicated by recurrent high grade fevers and found with fungemia.    NEUROLOGY  # Hx of Pseudotumor Cerebri   - s/p LP in 2024 with high opening pressure  - s/p MRI 2024 with possible pituitary mass but unclear because of pressure effect from pseudotumor cerebri causing partially empty sella.  - Prolactin elevated   - ACTH low but received steroids during admission   - FT4 low normal and TSH normal, but given FT4 low normal TSH should be higher   - Discuss endocrine consult for inhouse work up vs outpatient.     # Sedation   - Weaned off sedation in ICU   - Nimbex turned off 2/27   - Prop turned off 3/9   - Precedex turned off and catapres started 3/11  - catapres 0.2 TID (decreased 3/25) - tapered down 3/31 to 0.1 tid - discontinued 4/3     # Insomnia   - Patient worked night shift x 15 years   - Trouble sleeping at night leading to day time sleepiness and poor participation with PT  - Continue on Seroquel 25 QHS (increased 3/18)   - Continue on melatonin   - Continue on Neurontin as below    # BL LE neuropathy   - CIPN concern   - Neurontin increased to 300mg Q8H with relief   - added Percocet Q4H for moderate pain (3/25) and continue on Dilaudid Q6H for severe pain   - Pain mgt called to consult - recs for tylenol ATC, NEurontin increased to 400mg q6, Oxycodone 5 q4h prn , Dilaudid for BREAKTHROUGH pain only, Lidocaine patches on each leg, ice prn to feet  - Some improvement in pain - PT consult for TENS,   - oxycodone inc to 10mg as pt with no relief /buttocks area indurated /tender CT ordered -no abscess  - C/w Gabapentin : 600/600/800 and up titrate as required  - Switched to PO Dilaudid 2mg Q6H PRN moderate-severe and IV Dilaudid PRN severe breakthrough and with dressing changes   - Still with severe pain 10/10 despite change to Dilaudid will increase to 4mg q4h prn with iv Dilaudid breakthrough  - Neurontin increased to 800mg q8h Monitor neuro status/ lethargy   - I have reviewed with pt to not wait to long for dose of pain medication to  prevent pain from escalating   - Meds reviewed despite being in constant pain pt not taking Dilaudid as often as he can often with 10/10 pain Dilaudid will be every 4 hours with parameters and pt can refuse Will reconsult pain mgt again for any recommendations on Monday   - Pain management reconsulted , recs to stop Dilaudid PO ATC , continue Dilaudid for breakthrough pain with dressing changes, increase gabapentin to 900 mg Q8, add Cymbalta 30 mg QD and Flexeril 5mg Q8    CARDIOVASCULAR  # HTN   - HTN noted in ICU requiring cardene and esmolol GTTs   - Lisinopril dc'ed to increase catapres   - Continue on norvasc 10, catapres weaned off 4/3  - Monitor BP     # AFIB   - Hx of pAFIB   - No RVR episodes or pAFIB episodes in ICU   - No rate control medications needed  - Monitor on telemetry     # RV dilation second to PHTN   - POCUS on arrival to [facility] with RVE concern   - TTE 10/2024 with EF 55-60 with normal LVSF, moderate LVH and normal RVSF and size with no concern for pHTN   - TTE on 2/25 at  with EF 61 and normal LVSF, but enlarged RV size with reduced RVSF, mild TR, mild PHTN with PASP 49, and dilated IVC to 3.4cn, but normal TAPSE 2.1.  - Continue on aggressive diuresis in ICU    - TTE 3/11 with RVSF reduced with TAPSE 1.6. IVC improved to 2.12cm.   - Lasix 40 PO QD discontinued 4/7 secondary to ELLA  - Monitor IO/ BMP    RESPIRATORY  # ARDS second to MFPNA   - Hx of BEA on CPAP presented to  post URI with SOB and found with AHRF with progression to ARDS second to post viral MFPNA   - Intubated 2/25   - Cannulated for vvECMO 2/26   - s/p 60XLTCP tracheostomy 3/7   - Decannulated 3/7   - CTSx removed tracheostomy and ECMO sutures on 3/17   - Continue on TC QD with PMV as able with strong phonation  - Continue on nebs and HTS   -  trials continue and now trialing overnight with BIPAP for OHS (10/8/12)  - Continue on PS 18/10/40 QHS given OHS   - Decannulated 3/28   - c/w nasal cannula 2L     GI  # Dysphagia   - s/p PEG 3/7   - Attempted green dye on 3/13 and failed  - Continue on TF  - Continue on miralax and senna  - RPT green dye passed 3/19  - 3/20 Passed FEEST - on soft bite sized with thin liquids Added consistent carb 2/2 weight/Elevated A1c   - plan for OR 4/15 for PEG removal with cardiothoracic surgery   - Hold Eliquis held for 72 hours last dose 4/12 AM - will begin Heparin drip on Saturday to bridge to procedure     # Mild transaminitis   - LFTs elevated in ICU with TBILI elevated likely from ECMO hemolysis   - US ABD with hepatic steatosis   - Trend LFTs      RENAL  # ELLA   - ELLA likely from cardiorenal with RVE   - Diuresed in ICU and improved   - Monitor renal function and UOP   - Recurrent ELLA noted suspected in s/o vancomycin toxicity (4/7)   - UA negative and Urine Studies FeNa 0.9 % suggest pre-renal etiology  - Lasix discontinued for now  - s/p IV fluid trial on 4/8 with minimal improvement  - Trialing additional fluids on 4/10   - Trend BMP and I/Os    # Hypernatremia (resolved)  - Hypernatremia with fever spikes   - Fever curve improved and attempting to wean FWF   - now off FWF    INFECTIOUS DISEASE  # Recurrent fevers with fungemia from unknown source   - Recurrent fevers post ECMO decannulation thought initially to be cytokine surge   - BCx, SCx, UCx, RVP and MRSA RPT on 3/12 negative   - Completed zosyn (3/12-3/18) empirically with improved WBC , however recurrent fevers noted.   - RPT SCx, UA, CXR and RVP negative  - TTE 3/17 with no IE  - PanCT 3/17 with PNA and persistent panniculitis  - BCx 3/15 and 3/16 with candida albicans, rpt BCx 3/18 and 3/20 negative   - Concern for possible source from panniculitis   - Continue on Caspofungin 70 mg daily 3/17 - 4/15, ID consult appreciated   - 3/30 spiked temp cx's sent + ua and c/s pending Bcx neg to date - started on vanco /zosyn ID following  - Vanco stopped (4/7) due to ELLA concern for Vanco toxicty and continued on empiric Zosyn/ Caspofungin   - 4/3 - persistent fevers, leukocytosis. Will repeat CT C/A/P to evaluate for source - no source found - Bilat lung opacities mildly decreased     - 4/8 bone scan: : "1. Large area of soft tissue infection in the R side of the pelvis as detailed above. Slightly more intense activity in the R iliac bone compared to the L raises possibility of OM. 2. Intense activity in the lateral aspect of the R forefoot compatible with soft tissue infection; complicating osteomyelitis is not excluded. Milder activity in the medial aspect of the L forefoot may reflect soft tissue infection."  - ID consult appreciated. Continue on Zosyn 3.375 g 8, plan on 6 week course for possible OM until 5/11 and Caspofungin 70 mg daily, will plan on 4 week course ending 4/15  - Obtain MRI pelvis to eval for OM pending    # MFPNA and abdominal pannus cellulitis   - Presented to  post URI with SOB and found with AHRF with progression to ARDS second to post viral MFPNA   - RVP, legionella, strept, BAL, BCx, UCx, HIV, PCP, and adenovirus PCR negative   - Initially started on multiple agents in ICU and ultimately completed zosyn (2/26-3/9) ZMAX (2/25-2/26) and vanco (2/26-3/1)     # R/o Sacral OM  - Noted with hard indurated sacral wound by Wound Care Team  - CT Pelvis (3/29) revealing superficial skin thickening overlying the sacrum with underlying edema of the subcutaneous fat, in keeping with the clinical history of sacral wound. No evidence of underlying tract or drainable fluid collection.  - CT 4/3 - no fluid collection in sacral tissue   - Given ongoing fever with no clear source with obtain MRI Pelvis per ID recs to r/o sacral OM once renal fx improves  - MR pelvis pending, antibiotics as above     # Penile discharge   - GC/ chlamydia negative   - HIV negative   - Monitor for now    # PPX   - MRSA PCR positive and completed bactroban (2/26-3/3)     HEME   # Anemia likely second to ECMO circuit vs AOCD   - HH low in ICU second to ECMO circuit   - HH dropping again today however no bleeding noted   - Microcytic and hemochromic, sent anemia panel 3/19  - Trend HH     VASCULAR   # R IJ and BL LE DVTs   - Post ECMO dopplers on 3/9 negative for BL UE/ LE DVTs.   - Subsequent post ECMO dopplers performed on 3/14 and noted with acute, non-occlusive deep vein thrombosis noted within the right internal jugular vein. Acute, occlusive deep vein thromboses noted within the right and left peroneal veins at both mid calves, and age-indeterminate, occlusive deep vein thrombosis visualized within the left gastrocnemius vein at the proximal calf.     - Continue on argatroban GTT and changed to Eliquis   -  last dose of Eliquis Saturday morning 4/12 in AM in preparation for PEG removal, plan to begin heparin drip     PODIATRY   # BL plantar feet blisters likely pressors induced  - Seen by podiatry and blisters lanced on 3/4 and again on 3/18  - Continue on local wound care per podiatry   - Podiatry following  - OK for WBAT will fu with PT for offloading shoe if appropriate  - Pain mgt consult   - Podiatry FU 3/28 / 4/3 done /following     ENDOCRINE  # Pre-DM2   - A1C 6.7  - Continue on ISS   - Monitor FS   - IF no demand then stop ISS     LINES/ TUBES  - R IJ and R FEM ECMO (2/26-3/7)     SKIN  # Pannus canndial intertrigo   # Sacrum/ buttock MAD  - Continue on clotrimazole cream   - WOC appreciated   -Seen by WC pt noted to have oozing from Buttock wound surgicel placed by wound care On follow up no further bleeding  - Wound care placed orders   - No bleeding from wound     ETHICS/ GOC    - FULL CODE     DISPO - PT following  Seen by PMR for acute rehab per recs   38 YO M with PMHx of morbid obesity, BEA on CPAP, pAFIB (not on AC), HTN, and BL papilledema attributed to pseudotumor cerebri who presented initially to [facility] on 2/24 with SOB and BL LE edema. Found with URI outpatient with progressive SOB, and concern for noted for MFPNA on imaging. Course progressed with AHRF with worsening O2 demand, respiratory distress, secretions, and somnolence requiring intubation (difficult with success after 6 attempts) in ED and admission to [facility] MICU. While in MICU, patient noted with increasing O2 demand with no improvement despite optimal vent management. Concern noted for progressive ARDS, unable to prone given morbid obesity, and ultimately cannulated for vvECMO on 2/25 and transferred to [facility] for further management on 2/26. While at [facility], tx with diuresis and ABX, and ultimately decannulated and s/p 60XLTCP trach and PEG on 3/7. Patient ultimately transferred to RCU on 3/11 and course complicated by recurrent high grade fevers and found with fungemia.    NEUROLOGY  # Hx of Pseudotumor Cerebri   - s/p LP in 2024 with high opening pressure  - s/p MRI 2024 with possible pituitary mass but unclear because of pressure effect from pseudotumor cerebri causing partially empty sella.  - Prolactin elevated   - ACTH low but received steroids during admission   - FT4 low normal and TSH normal, but given FT4 low normal TSH should be higher   - Discuss endocrine consult for inhouse work up vs outpatient.     # Sedation   - Weaned off sedation in ICU   - Nimbex turned off 2/27   - Prop turned off 3/9   - Precedex turned off and catapres started 3/11  - catapres 0.2 TID (decreased 3/25) - tapered down 3/31 to 0.1 tid - discontinued 4/3     # Insomnia   - Patient worked night shift x 15 years   - Trouble sleeping at night leading to day time sleepiness and poor participation with PT  - Continue on Seroquel 25 QHS (increased 3/18)   - Continue on melatonin   - Continue on Neurontin as below    # BL LE neuropathy   - CIPN concern   - Neurontin increased to 300mg Q8H with relief   - added Percocet Q4H for moderate pain (3/25) and continue on Dilaudid Q6H for severe pain   - Pain mgt called to consult - recs for tylenol ATC, NEurontin increased to 400mg q6, Oxycodone 5 q4h prn , Dilaudid for BREAKTHROUGH pain only, Lidocaine patches on each leg, ice prn to feet  - Some improvement in pain - PT consult for TENS,   - oxycodone inc to 10mg as pt with no relief /buttocks area indurated /tender CT ordered -no abscess  - C/w Gabapentin : 600/600/800 and up titrate as required  - Switched to PO Dilaudid 2mg Q6H PRN moderate-severe and IV Dilaudid PRN severe breakthrough and with dressing changes   - Still with severe pain 10/10 despite change to Dilaudid will increase to 4mg q4h prn with iv Dilaudid breakthrough  - Neurontin increased to 800mg q8h Monitor neuro status/ lethargy   - I have reviewed with pt to not wait to long for dose of pain medication to  prevent pain from escalating   - Meds reviewed despite being in constant pain pt not taking Dilaudid as often as he can often with 10/10 pain Dilaudid will be every 4 hours with parameters and pt can refuse Will reconsult pain mgt again for any recommendations on Monday   - Pain management reconsulted , recs to stop Dilaudid PO ATC , continue Dilaudid for breakthrough pain with dressing changes, increase gabapentin to 900 mg Q8, add Cymbalta 30 mg QD and Flexeril 5mg Q8    CARDIOVASCULAR  # HTN   - HTN noted in ICU requiring cardene and esmolol GTTs   - Lisinopril dc'ed to increase catapres   - Continue on norvasc 10, catapres weaned off 4/3  - Monitor BP     # AFIB   - Hx of pAFIB   - No RVR episodes or pAFIB episodes in ICU   - No rate control medications needed  - Monitor on telemetry     # RV dilation second to PHTN   - POCUS on arrival to [facility] with RVE concern   - TTE 10/2024 with EF 55-60 with normal LVSF, moderate LVH and normal RVSF and size with no concern for pHTN   - TTE on 2/25 at  with EF 61 and normal LVSF, but enlarged RV size with reduced RVSF, mild TR, mild PHTN with PASP 49, and dilated IVC to 3.4cn, but normal TAPSE 2.1.  - Continue on aggressive diuresis in ICU    - TTE 3/11 with RVSF reduced with TAPSE 1.6. IVC improved to 2.12cm.   - Lasix 40 PO QD discontinued 4/7 secondary to ELLA  - Monitor IO/ BMP    RESPIRATORY  # ARDS second to MFPNA   - Hx of BEA on CPAP presented to  post URI with SOB and found with AHRF with progression to ARDS second to post viral MFPNA   - Intubated 2/25   - Cannulated for vvECMO 2/26   - s/p 60XLTCP tracheostomy 3/7   - Decannulated 3/7   - CTSx removed tracheostomy and ECMO sutures on 3/17   - Continue on TC QD with PMV as able with strong phonation  - Continue on nebs and HTS   -  trials continue and now trialing overnight with BIPAP for OHS (10/8/12)  - Continue on PS 18/10/40 QHS given OHS   - Decannulated 3/28   - c/w nasal cannula 2L     GI  # Dysphagia   - s/p PEG 3/7   - Attempted green dye on 3/13 and failed  - Continue on TF  - Continue on miralax and senna  - RPT green dye passed 3/19  - 3/20 Passed FEEST - on soft bite sized with thin liquids Added consistent carb 2/2 weight/Elevated A1c   - plan for OR 4/15 for PEG removal with cardiothoracic surgery   - Hold Eliquis held for 72 hours last dose 4/12 AM - will begin Heparin drip on Saturday to bridge to procedure     # Mild transaminitis   - LFTs elevated in ICU with TBILI elevated likely from ECMO hemolysis   - US ABD with hepatic steatosis   - Trend LFTs      RENAL  # ELLA   - ELLA likely from cardiorenal with RVE   - Diuresed in ICU and improved   - Monitor renal function and UOP   - Recurrent ELLA noted suspected in s/o vancomycin toxicity (4/7)   - UA negative and Urine Studies FeNa 0.9 % suggest pre-renal etiology  - Lasix discontinued for now  - s/p IV fluid trial on 4/8 with minimal improvement  - Trialing additional fluids on 4/10   - Trend BMP and I/Os    # Hypernatremia (resolved)  - Hypernatremia with fever spikes   - Fever curve improved and attempting to wean FWF   - now off FWF    INFECTIOUS DISEASE  # Recurrent fevers with fungemia from unknown source   - Recurrent fevers post ECMO decannulation thought initially to be cytokine surge   - BCx, SCx, UCx, RVP and MRSA RPT on 3/12 negative   - Completed zosyn (3/12-3/18) empirically with improved WBC , however recurrent fevers noted.   - RPT SCx, UA, CXR and RVP negative  - TTE 3/17 with no IE  - PanCT 3/17 with PNA and persistent panniculitis  - BCx 3/15 and 3/16 with candida albicans, rpt BCx 3/18 and 3/20 negative   - Concern for possible source from panniculitis   - Continue on Caspofungin 70 mg daily 3/17 - 4/15, ID consult appreciated   - 3/30 spiked temp cx's sent + ua and c/s pending Bcx neg to date - started on vanco /zosyn ID following  - Vanco stopped (4/7) due to ELLA concern for Vanco toxicty and continued on empiric Zosyn/ Caspofungin   - 4/3 - persistent fevers, leukocytosis. Will repeat CT C/A/P to evaluate for source - no source found - Bilat lung opacities mildly decreased     - 4/8 bone scan: : "1. Large area of soft tissue infection in the R side of the pelvis as detailed above. Slightly more intense activity in the R iliac bone compared to the L raises possibility of OM. 2. Intense activity in the lateral aspect of the R forefoot compatible with soft tissue infection; complicating osteomyelitis is not excluded. Milder activity in the medial aspect of the L forefoot may reflect soft tissue infection."  - ID consult appreciated. Continue on Zosyn 3.375 g 8, plan on 6 week course for possible OM until 5/11 and Caspofungin 70 mg daily, will plan on 4 week course ending 4/15  - MR Pelvis Bony (4/11): No osteomyelitis. Sacral decubitus wound is again seen extending superiorly along the superficial margin of the right gluteus zurdo to the level of L5 with small amount of fluid and gas within the tract. The underlying gluteus zurdo demonstrates a region of decreased enhancement consistent with ischemia or necrosis measuring up to 11.9 cm transversely. Muscle edema suggests a nonspecific myositis. Small bilateral hip joint effusions with mild synovitis is nonspecific.    # MFPNA and abdominal pannus cellulitis   - Presented to  post URI with SOB and found with AHRF with progression to ARDS second to post viral MFPNA   - RVP, legionella, strept, BAL, BCx, UCx, HIV, PCP, and adenovirus PCR negative   - Initially started on multiple agents in ICU and ultimately completed zosyn (2/26-3/9) ZMAX (2/25-2/26) and vanco (2/26-3/1)     # R/o Sacral OM  - Noted with hard indurated sacral wound by Wound Care Team  - CT Pelvis (3/29) revealing superficial skin thickening overlying the sacrum with underlying edema of the subcutaneous fat, in keeping with the clinical history of sacral wound. No evidence of underlying tract or drainable fluid collection.  - CT 4/3 - no fluid collection in sacral tissue   - Given ongoing fever with no clear source with obtain MRI Pelvis per ID recs to r/o sacral OM once renal fx improves    # Penile discharge   - GC/ chlamydia negative   - HIV negative   - Monitor for now    # PPX   - MRSA PCR positive and completed bactroban (2/26-3/3)     HEME   # Anemia likely second to ECMO circuit vs AOCD   - HH low in ICU second to ECMO circuit   - HH dropping again today however no bleeding noted   - Microcytic and hemochromic, sent anemia panel 3/19  - Trend HH     VASCULAR   # R IJ and BL LE DVTs   - Post ECMO dopplers on 3/9 negative for BL UE/ LE DVTs.   - Subsequent post ECMO dopplers performed on 3/14 and noted with acute, non-occlusive deep vein thrombosis noted within the right internal jugular vein. Acute, occlusive deep vein thromboses noted within the right and left peroneal veins at both mid calves, and age-indeterminate, occlusive deep vein thrombosis visualized within the left gastrocnemius vein at the proximal calf.     - Continue on argatroban GTT and changed to Eliquis   - last dose of Eliquis Saturday morning 4/12 in AM in preparation for PEG removal, plan to begin heparin drip     PODIATRY   # BL plantar feet blisters likely pressors induced  - Seen by podiatry and blisters lanced on 3/4 and again on 3/18  - Continue on local wound care per podiatry   - Podiatry following  - OK for WBAT will fu with PT for offloading shoe if appropriate  - Pain mgt consult   - Podiatry FU 3/28 / 4/3 done /following     ENDOCRINE  # Pre-DM2   - A1C 6.7  - Continue on ISS   - Monitor FS   - IF no demand then stop ISS     LINES/ TUBES  - R IJ and R FEM ECMO (2/26-3/7)     SKIN  # Pannus canndial intertrigo   # Sacrum/ buttock MAD  - Continue on clotrimazole cream   - WOC appreciated   -Seen by WC pt noted to have oozing from Buttock wound surgicel placed by wound care On follow up no further bleeding  - Wound care placed orders   - No bleeding from wound     ETHICS/ GOC    - FULL CODE     DISPO - PT following  Seen by PMR for acute rehab per recs

## 2025-04-12 LAB
ANION GAP SERPL CALC-SCNC: 11 MMOL/L — SIGNIFICANT CHANGE UP (ref 7–14)
APTT BLD: 31.1 SEC — SIGNIFICANT CHANGE UP (ref 24.5–35.6)
BUN SERPL-MCNC: 13 MG/DL — SIGNIFICANT CHANGE UP (ref 7–23)
CALCIUM SERPL-MCNC: 9.2 MG/DL — SIGNIFICANT CHANGE UP (ref 8.4–10.5)
CHLORIDE SERPL-SCNC: 97 MMOL/L — LOW (ref 98–107)
CO2 SERPL-SCNC: 31 MMOL/L — SIGNIFICANT CHANGE UP (ref 22–31)
CREAT SERPL-MCNC: 1.48 MG/DL — HIGH (ref 0.5–1.3)
CULTURE RESULTS: SIGNIFICANT CHANGE UP
EGFR: 62 ML/MIN/1.73M2 — SIGNIFICANT CHANGE UP
EGFR: 62 ML/MIN/1.73M2 — SIGNIFICANT CHANGE UP
GLUCOSE BLDC GLUCOMTR-MCNC: 102 MG/DL — HIGH (ref 70–99)
GLUCOSE BLDC GLUCOMTR-MCNC: 105 MG/DL — HIGH (ref 70–99)
GLUCOSE BLDC GLUCOMTR-MCNC: 95 MG/DL — SIGNIFICANT CHANGE UP (ref 70–99)
GLUCOSE BLDC GLUCOMTR-MCNC: 99 MG/DL — SIGNIFICANT CHANGE UP (ref 70–99)
GLUCOSE SERPL-MCNC: 96 MG/DL — SIGNIFICANT CHANGE UP (ref 70–99)
HCT VFR BLD CALC: 25.6 % — LOW (ref 39–50)
HGB BLD-MCNC: 7.9 G/DL — LOW (ref 13–17)
MAGNESIUM SERPL-MCNC: 1.8 MG/DL — SIGNIFICANT CHANGE UP (ref 1.6–2.6)
MCHC RBC-ENTMCNC: 27.7 PG — SIGNIFICANT CHANGE UP (ref 27–34)
MCHC RBC-ENTMCNC: 30.9 G/DL — LOW (ref 32–36)
MCV RBC AUTO: 89.8 FL — SIGNIFICANT CHANGE UP (ref 80–100)
NRBC # BLD AUTO: 0 K/UL — SIGNIFICANT CHANGE UP (ref 0–0)
NRBC # FLD: 0 K/UL — SIGNIFICANT CHANGE UP (ref 0–0)
NRBC BLD AUTO-RTO: 0 /100 WBCS — SIGNIFICANT CHANGE UP (ref 0–0)
PHOSPHATE SERPL-MCNC: 5.5 MG/DL — HIGH (ref 2.5–4.5)
PLATELET # BLD AUTO: 472 K/UL — HIGH (ref 150–400)
POTASSIUM SERPL-MCNC: 3.7 MMOL/L — SIGNIFICANT CHANGE UP (ref 3.5–5.3)
POTASSIUM SERPL-SCNC: 3.7 MMOL/L — SIGNIFICANT CHANGE UP (ref 3.5–5.3)
RBC # BLD: 2.85 M/UL — LOW (ref 4.2–5.8)
RBC # FLD: 22.8 % — HIGH (ref 10.3–14.5)
SODIUM SERPL-SCNC: 139 MMOL/L — SIGNIFICANT CHANGE UP (ref 135–145)
SPECIMEN SOURCE: SIGNIFICANT CHANGE UP
WBC # BLD: 10.89 K/UL — HIGH (ref 3.8–10.5)
WBC # FLD AUTO: 10.89 K/UL — HIGH (ref 3.8–10.5)

## 2025-04-12 PROCEDURE — 99232 SBSQ HOSP IP/OBS MODERATE 35: CPT

## 2025-04-12 RX ORDER — HEPARIN SODIUM 1000 [USP'U]/ML
5000 INJECTION INTRAVENOUS; SUBCUTANEOUS EVERY 6 HOURS
Refills: 0 | Status: DISCONTINUED | OUTPATIENT
Start: 2025-04-12 | End: 2025-04-14

## 2025-04-12 RX ORDER — HEPARIN SODIUM 1000 [USP'U]/ML
INJECTION INTRAVENOUS; SUBCUTANEOUS
Qty: 25000 | Refills: 0 | Status: DISCONTINUED | OUTPATIENT
Start: 2025-04-12 | End: 2025-04-14

## 2025-04-12 RX ORDER — HEPARIN SODIUM 1000 [USP'U]/ML
10000 INJECTION INTRAVENOUS; SUBCUTANEOUS EVERY 6 HOURS
Refills: 0 | Status: DISCONTINUED | OUTPATIENT
Start: 2025-04-12 | End: 2025-04-14

## 2025-04-12 RX ORDER — HYDROMORPHONE/SOD CHLOR,ISO/PF 2 MG/10 ML
0.5 SYRINGE (ML) INJECTION ONCE
Refills: 0 | Status: DISCONTINUED | OUTPATIENT
Start: 2025-04-12 | End: 2025-04-12

## 2025-04-12 RX ADMIN — LIDOCAINE HYDROCHLORIDE 1 PATCH: 20 JELLY TOPICAL at 11:42

## 2025-04-12 RX ADMIN — Medication 1 APPLICATION(S): at 05:23

## 2025-04-12 RX ADMIN — Medication 3 MILLIGRAM(S): at 22:24

## 2025-04-12 RX ADMIN — Medication 0.5 MILLIGRAM(S): at 11:41

## 2025-04-12 RX ADMIN — Medication 0.5 MILLIGRAM(S): at 05:23

## 2025-04-12 RX ADMIN — HEPARIN SODIUM 2400 UNIT(S)/HR: 1000 INJECTION INTRAVENOUS; SUBCUTANEOUS at 18:14

## 2025-04-12 RX ADMIN — Medication 0.5 MILLIGRAM(S): at 05:49

## 2025-04-12 RX ADMIN — DULOXETINE 30 MILLIGRAM(S): 20 CAPSULE, DELAYED RELEASE ORAL at 11:42

## 2025-04-12 RX ADMIN — CLOTRIMAZOLE 1 APPLICATION(S): 1 CREAM TOPICAL at 16:31

## 2025-04-12 RX ADMIN — GABAPENTIN 800 MILLIGRAM(S): 400 CAPSULE ORAL at 22:24

## 2025-04-12 RX ADMIN — SODIUM HYPOCHLORITE 1 APPLICATION(S): 0.12 SOLUTION TOPICAL at 16:30

## 2025-04-12 RX ADMIN — Medication 25 GRAM(S): at 13:16

## 2025-04-12 RX ADMIN — Medication 0.5 MILLIGRAM(S): at 22:55

## 2025-04-12 RX ADMIN — Medication 0.5 MILLIGRAM(S): at 12:11

## 2025-04-12 RX ADMIN — MUPIROCIN CALCIUM 1 APPLICATION(S): 20 CREAM TOPICAL at 16:31

## 2025-04-12 RX ADMIN — AMLODIPINE BESYLATE 10 MILLIGRAM(S): 10 TABLET ORAL at 05:25

## 2025-04-12 RX ADMIN — APIXABAN 5 MILLIGRAM(S): 2.5 TABLET, FILM COATED ORAL at 05:25

## 2025-04-12 RX ADMIN — SEVELAMER HYDROCHLORIDE 800 MILLIGRAM(S): 800 TABLET ORAL at 11:42

## 2025-04-12 RX ADMIN — Medication 0.5 MILLIGRAM(S): at 16:39

## 2025-04-12 RX ADMIN — CASPOFUNGIN ACETATE 260 MILLIGRAM(S): 5 INJECTION, POWDER, LYOPHILIZED, FOR SOLUTION INTRAVENOUS at 05:26

## 2025-04-12 RX ADMIN — Medication 40 MILLIGRAM(S): at 05:25

## 2025-04-12 RX ADMIN — Medication 1 TABLET(S): at 11:42

## 2025-04-12 RX ADMIN — GABAPENTIN 800 MILLIGRAM(S): 400 CAPSULE ORAL at 05:24

## 2025-04-12 RX ADMIN — CLOTRIMAZOLE 1 APPLICATION(S): 1 CREAM TOPICAL at 05:25

## 2025-04-12 RX ADMIN — SEVELAMER HYDROCHLORIDE 800 MILLIGRAM(S): 800 TABLET ORAL at 16:44

## 2025-04-12 RX ADMIN — Medication 25 GRAM(S): at 05:22

## 2025-04-12 RX ADMIN — SODIUM HYPOCHLORITE 1 APPLICATION(S): 0.12 SOLUTION TOPICAL at 05:23

## 2025-04-12 RX ADMIN — GABAPENTIN 800 MILLIGRAM(S): 400 CAPSULE ORAL at 13:17

## 2025-04-12 RX ADMIN — MUPIROCIN CALCIUM 1 APPLICATION(S): 20 CREAM TOPICAL at 05:26

## 2025-04-12 RX ADMIN — Medication 25 GRAM(S): at 22:25

## 2025-04-12 RX ADMIN — HEPARIN SODIUM 2400 UNIT(S)/HR: 1000 INJECTION INTRAVENOUS; SUBCUTANEOUS at 00:49

## 2025-04-12 RX ADMIN — Medication 0.5 MILLIGRAM(S): at 17:09

## 2025-04-12 RX ADMIN — CYCLOBENZAPRINE HYDROCHLORIDE 5 MILLIGRAM(S): 15 CAPSULE, EXTENDED RELEASE ORAL at 05:24

## 2025-04-12 RX ADMIN — Medication 0.5 MILLIGRAM(S): at 22:25

## 2025-04-12 RX ADMIN — SEVELAMER HYDROCHLORIDE 800 MILLIGRAM(S): 800 TABLET ORAL at 08:58

## 2025-04-12 RX ADMIN — QUETIAPINE FUMARATE 25 MILLIGRAM(S): 25 TABLET ORAL at 22:24

## 2025-04-12 NOTE — PROGRESS NOTE ADULT - SUBJECTIVE AND OBJECTIVE BOX
CHIEF COMPLAINT: Patient is a 39y old  Male who presents with a chief complaint of sob    INTERVAL EVENTS: No acute events overnight.    REVIEW OF SYSTEMS: Seen and evaluated by bedside during AM rounds.            OBJECTIVE:  ICU Vital Signs Last 24 Hrs  T(C): 36.7 (11 Apr 2025 22:10), Max: 36.7 (11 Apr 2025 22:10)  T(F): 98 (11 Apr 2025 22:10), Max: 98 (11 Apr 2025 22:10)  HR: 105 (12 Apr 2025 03:06) (71 - 112)  BP: 149/95 (11 Apr 2025 22:10) (135/78 - 149/95)  BP(mean): 96 (11 Apr 2025 10:21) (96 - 96)  ABP: --  ABP(mean): --  RR: 18 (11 Apr 2025 22:10) (18 - 22)  SpO2: 100% (12 Apr 2025 03:06) (98% - 100%)    O2 Parameters below as of 11 Apr 2025 22:10  Patient On (Oxygen Delivery Method): nasal cannula w/ humidification  O2 Flow (L/min): 2            04-11 @ 07:01  -  04-12 @ 07:00  --------------------------------------------------------  IN: 0 mL / OUT: 5700 mL / NET: -5700 mL      CAPILLARY BLOOD GLUCOSE      POCT Blood Glucose.: 105 mg/dL (11 Apr 2025 21:39)      HOSPITAL MEDICATIONS:  MEDICATIONS  (STANDING):  amLODIPine   Tablet 10 milliGRAM(s) Oral daily  caspofungin IVPB 70 milliGRAM(s) IV Intermittent every 24 hours  chlorhexidine 2% Cloths 1 Application(s) Topical <User Schedule>  clotrimazole 1% Cream 1 Application(s) Topical two times a day  Dakins Solution - 1/4 Strength 1 Application(s) Topical two times a day  dextrose 50% Injectable 25 Gram(s) IV Push once  dextrose 50% Injectable 12.5 Gram(s) IV Push once  dextrose 50% Injectable 25 Gram(s) IV Push once  DULoxetine 30 milliGRAM(s) Oral daily  gabapentin 800 milliGRAM(s) Oral every 8 hours  glucagon  Injectable 1 milliGRAM(s) IntraMuscular once  insulin lispro (ADMELOG) corrective regimen sliding scale   SubCutaneous three times a day before meals  insulin lispro (ADMELOG) corrective regimen sliding scale   SubCutaneous at bedtime  lactated ringers. 1000 milliLiter(s) (100 mL/Hr) IV Continuous <Continuous>  lidocaine   4% Patch 1 Patch Transdermal daily  lidocaine   4% Patch 1 Patch Transdermal daily  melatonin 3 milliGRAM(s) Oral at bedtime  multivitamin 1 Tablet(s) Oral daily  mupirocin 2% Ointment 1 Application(s) Topical two times a day  pantoprazole    Tablet 40 milliGRAM(s) Oral before breakfast  piperacillin/tazobactam IVPB.. 3.375 Gram(s) IV Intermittent every 8 hours  polyethylene glycol 3350 17 Gram(s) Oral daily  QUEtiapine 25 milliGRAM(s) Oral at bedtime  senna 2 Tablet(s) Oral at bedtime  sevelamer carbonate 800 milliGRAM(s) Oral three times a day with meals    MEDICATIONS  (PRN):  acetaminophen     Tablet .. 650 milliGRAM(s) Oral every 6 hours PRN Temp greater or equal to 38C (100.4F), Mild Pain (1 - 3), Moderate Pain (4 - 6)  albuterol/ipratropium for Nebulization 3 milliLiter(s) Nebulizer every 6 hours PRN Shortness of Breath and/or Wheezing  cyclobenzaprine 5 milliGRAM(s) Oral three times a day PRN Muscle Spasm  dextrose Oral Gel 15 Gram(s) Oral once PRN Blood Glucose LESS THAN 70 milliGRAM(s)/deciliter  HYDROmorphone  Injectable 0.5 milliGRAM(s) IV Push every 6 hours PRN dressing changes and severe breakthrough      PHYSICAL EXAMINATION  General: Alert and cooperative. Resting comfortably on edge of bed, working with PT. No apparent distress noted.   HEENT: Normocephalic/atraumatic.   Cardiovascular: Regular rhythm. + Sinus tachycardia    Respiratory: + NC. Breath sounds clear w/ diminished bases bilaterally without wheezing. No accessory muscles used.   Abdomen: + Central obesity. + PEG c/d/i. Soft, nontender, nondistended.   Skin: Warm to touch.   Extremities: + Full range of motion on upper extremities. Unable to move lower extremities. B/L lower extremities wrapped with gauze  No clubbing, cyanosis, edema, or varicose veins.   Neuro: AxO x 3. Follow commands.       LABS:                        8.1    10.16 )-----------( 469      ( 11 Apr 2025 10:15 )             28.7     04-11    137  |  96[L]  |  14  ----------------------------<  92  4.0   |  30  |  1.65[H]    Ca    9.3      11 Apr 2025 10:15  Phos  6.0     04-11  Mg     1.80     04-11      PTT - ( 12 Apr 2025 06:20 )  PTT:31.1 sec  Urinalysis Basic - ( 11 Apr 2025 10:15 )    Color: x / Appearance: x / SG: x / pH: x  Gluc: 92 mg/dL / Ketone: x  / Bili: x / Urobili: x   Blood: x / Protein: x / Nitrite: x   Leuk Esterase: x / RBC: x / WBC x   Sq Epi: x / Non Sq Epi: x / Bacteria: x            PAST MEDICAL & SURGICAL HISTORY:  HTN (hypertension)      Atrial fibrillation  paroxysmal      Papilledema of both eyes      Chronic sinusitis      Morbid obesity      No significant past surgical history          FAMILY HISTORY:      Social History:  sexually active with female partner occasionally (26 Feb 2025 01:48)      RADIOLOGY:  [ ] Reviewed and interpreted by me    PULMONARY FUNCTION TESTS:    EKG: CHIEF COMPLAINT: Patient is a 39y old  Male who presents with a chief complaint of sob    INTERVAL EVENTS: No acute events overnight.    REVIEW OF SYSTEMS: Seen and evaluated by bedside during AM rounds.  Patient endorses experiencing constant b/l foot pain.           OBJECTIVE:  ICU Vital Signs Last 24 Hrs  T(C): 36.7 (11 Apr 2025 22:10), Max: 36.7 (11 Apr 2025 22:10)  T(F): 98 (11 Apr 2025 22:10), Max: 98 (11 Apr 2025 22:10)  HR: 105 (12 Apr 2025 03:06) (71 - 112)  BP: 149/95 (11 Apr 2025 22:10) (135/78 - 149/95)  BP(mean): 96 (11 Apr 2025 10:21) (96 - 96)  ABP: --  ABP(mean): --  RR: 18 (11 Apr 2025 22:10) (18 - 22)  SpO2: 100% (12 Apr 2025 03:06) (98% - 100%)    O2 Parameters below as of 11 Apr 2025 22:10  Patient On (Oxygen Delivery Method): nasal cannula w/ humidification  O2 Flow (L/min): 2            04-11 @ 07:01  -  04-12 @ 07:00  --------------------------------------------------------  IN: 0 mL / OUT: 5700 mL / NET: -5700 mL      CAPILLARY BLOOD GLUCOSE      POCT Blood Glucose.: 105 mg/dL (11 Apr 2025 21:39)      HOSPITAL MEDICATIONS:  MEDICATIONS  (STANDING):  amLODIPine   Tablet 10 milliGRAM(s) Oral daily  caspofungin IVPB 70 milliGRAM(s) IV Intermittent every 24 hours  chlorhexidine 2% Cloths 1 Application(s) Topical <User Schedule>  clotrimazole 1% Cream 1 Application(s) Topical two times a day  Dakins Solution - 1/4 Strength 1 Application(s) Topical two times a day  dextrose 50% Injectable 25 Gram(s) IV Push once  dextrose 50% Injectable 12.5 Gram(s) IV Push once  dextrose 50% Injectable 25 Gram(s) IV Push once  DULoxetine 30 milliGRAM(s) Oral daily  gabapentin 800 milliGRAM(s) Oral every 8 hours  glucagon  Injectable 1 milliGRAM(s) IntraMuscular once  insulin lispro (ADMELOG) corrective regimen sliding scale   SubCutaneous three times a day before meals  insulin lispro (ADMELOG) corrective regimen sliding scale   SubCutaneous at bedtime  lactated ringers. 1000 milliLiter(s) (100 mL/Hr) IV Continuous <Continuous>  lidocaine   4% Patch 1 Patch Transdermal daily  lidocaine   4% Patch 1 Patch Transdermal daily  melatonin 3 milliGRAM(s) Oral at bedtime  multivitamin 1 Tablet(s) Oral daily  mupirocin 2% Ointment 1 Application(s) Topical two times a day  pantoprazole    Tablet 40 milliGRAM(s) Oral before breakfast  piperacillin/tazobactam IVPB.. 3.375 Gram(s) IV Intermittent every 8 hours  polyethylene glycol 3350 17 Gram(s) Oral daily  QUEtiapine 25 milliGRAM(s) Oral at bedtime  senna 2 Tablet(s) Oral at bedtime  sevelamer carbonate 800 milliGRAM(s) Oral three times a day with meals    MEDICATIONS  (PRN):  acetaminophen     Tablet .. 650 milliGRAM(s) Oral every 6 hours PRN Temp greater or equal to 38C (100.4F), Mild Pain (1 - 3), Moderate Pain (4 - 6)  albuterol/ipratropium for Nebulization 3 milliLiter(s) Nebulizer every 6 hours PRN Shortness of Breath and/or Wheezing  cyclobenzaprine 5 milliGRAM(s) Oral three times a day PRN Muscle Spasm  dextrose Oral Gel 15 Gram(s) Oral once PRN Blood Glucose LESS THAN 70 milliGRAM(s)/deciliter  HYDROmorphone  Injectable 0.5 milliGRAM(s) IV Push every 6 hours PRN dressing changes and severe breakthrough      PHYSICAL EXAMINATION  General: Alert and cooperative. Working with physical therapy, currently on edge of bed. No apparent distress noted. + Family at bedside.  HEENT: Normocephalic/atraumatic.   Cardiovascular: Regular rhythm. + Sinus tachycardia    Respiratory: + NC. Breath sounds clear w/ diminished bases bilaterally without wheezing. No accessory muscles used.   Abdomen: + Central obesity. + PEG c/d/i. Soft, nontender, nondistended.   Skin: Warm to touch.   Extremities: + Full range of motion on upper extremities. Unable to move lower extremities. B/L lower extremities wrapped with gauze  No clubbing, cyanosis, edema, or varicose veins.   Neuro: AxO x 3. Follow commands.       LABS:                        8.1    10.16 )-----------( 469      ( 11 Apr 2025 10:15 )             28.7     04-11    137  |  96[L]  |  14  ----------------------------<  92  4.0   |  30  |  1.65[H]    Ca    9.3      11 Apr 2025 10:15  Phos  6.0     04-11  Mg     1.80     04-11      PTT - ( 12 Apr 2025 06:20 )  PTT:31.1 sec  Urinalysis Basic - ( 11 Apr 2025 10:15 )    Color: x / Appearance: x / SG: x / pH: x  Gluc: 92 mg/dL / Ketone: x  / Bili: x / Urobili: x   Blood: x / Protein: x / Nitrite: x   Leuk Esterase: x / RBC: x / WBC x   Sq Epi: x / Non Sq Epi: x / Bacteria: x            PAST MEDICAL & SURGICAL HISTORY:  HTN (hypertension)      Atrial fibrillation  paroxysmal      Papilledema of both eyes      Chronic sinusitis      Morbid obesity      No significant past surgical history          FAMILY HISTORY:      Social History:  sexually active with female partner occasionally (26 Feb 2025 01:48)      RADIOLOGY:  [ ] Reviewed and interpreted by me    PULMONARY FUNCTION TESTS:    EKG:

## 2025-04-12 NOTE — PROGRESS NOTE ADULT - NS ATTEND AMEND GEN_ALL_CORE FT
40YO Male with obesity (Class III, BMI 69), pAfib (no AC), HTN, BEA (2019 AHI 34 non-compliant on CPAP 30dkC1I), and history of b/l papilledema attributed to pseudotumor cerebri initially presenting as a transfer [facility] on 2/26 for acute hypoxemic respiratory failure s/p ETT intubation/MV with progression to refractory ARDS s/p initiation of vv-ECMO support (2/26-3/7) with failure to wean from ventilator s/p tracheostomy placement, further found to have RV failure s/p aggressive diuresis and PNA followed by severe sepsis 2/2 panniculitis c/b candida albicans fungemia now transferred to [facility] for further medical management.     #Acute hypoxemic and hypercapnic respiratory failure s/p tracheostomy placement   - Decannulated at bedside 3/28, continues to use Bilevel NIV qhs    #RV failure 2/2 pulmHTN   - Maintaining euvolemia     #AFib  #DVT right IJ and bilateral peroneal veins  - AC for DVT and AFib    #Plan for removal of PEG   - Last dose of Eliquis 6am 4/12 will start Heparin drip  - Will DC with Eliquis    #Transaminitis improving  -Hepatomegaly will need to follow as outpatient    #Fungemia  #Sacral ulceration with induration  - NM WBC scan performed overnight concerning for active infection with possible osteomyelitis of right pelvis/iliac bone.   Evaluated by wound care team again at bedside 4/10, recommended MRI of pelvis to evaluate extent of deep tunnelling. Pt will need long-term Abx ~4 weeks through 5/11 and completion of caspofungin 4/15.       DVT ppx: heparin gtt  Code status: Full code

## 2025-04-12 NOTE — PROGRESS NOTE ADULT - ASSESSMENT
38 YO M with PMHx of morbid obesity, BEA on CPAP, pAFIB (not on AC), HTN, and BL papilledema attributed to pseudotumor cerebri who presented initially to [facility] on 2/24 with SOB and BL LE edema. Found with URI outpatient with progressive SOB, and concern for noted for MFPNA on imaging. Course progressed with AHRF with worsening O2 demand, respiratory distress, secretions, and somnolence requiring intubation (difficult with success after 6 attempts) in ED and admission to [facility] MICU. While in MICU, patient noted with increasing O2 demand with no improvement despite optimal vent management. Concern noted for progressive ARDS, unable to prone given morbid obesity, and ultimately cannulated for vvECMO on 2/25 and transferred to [facility] for further management on 2/26. While at [facility], tx with diuresis and ABX, and ultimately decannulated and s/p 60XLTCP trach and PEG on 3/7. Patient ultimately transferred to [facility] on 3/11 and course complicated by recurrent high grade fevers and found with fungemia.    NEUROLOGY  # Hx of Pseudotumor Cerebri   - s/p LP in 2024 with high opening pressure  - s/p MRI 2024 with possible pituitary mass but unclear because of pressure effect from pseudotumor cerebri causing partially empty sella.  - Prolactin elevated   - ACTH low but received steroids during admission   - FT4 low normal and TSH normal, but given FT4 low normal TSH should be higher   - Discuss endocrine consult for inhouse work up vs outpatient.     # Sedation   - Weaned off sedation in ICU   - Nimbex turned off 2/27   - Prop turned off 3/9   - Precedex turned off and catapres started 3/11  - catapres 0.2 TID (decreased 3/25) - tapered down 3/31 to 0.1 tid - discontinued 4/3     # Insomnia   - Patient worked night shift x 15 years   - Trouble sleeping at night leading to day time sleepiness and poor participation with PT  - Continue on Seroquel 25 QHS (increased 3/18)   - Continue on melatonin   - Continue on Neurontin as below    # BL LE neuropathy   - CIPN concern   - Neurontin increased to 300mg Q8H with relief   - added Percocet Q4H for moderate pain (3/25) and continue on Dilaudid Q6H for severe pain   - Pain mgt called to consult - recs for tylenol ATC, NEurontin increased to 400mg q6, Oxycodone 5 q4h prn , Dilaudid for BREAKTHROUGH pain only, Lidocaine patches on each leg, ice prn to feet  - Some improvement in pain - PT consult for TENS,   - oxycodone inc to 10mg as pt with no relief /buttocks area indurated /tender CT ordered -no abscess  - C/w Gabapentin : 600/600/800 and up titrate as required  - Switched to PO Dilaudid 2mg Q6H PRN moderate-severe and IV Dilaudid PRN severe breakthrough and with dressing changes   - Still with severe pain 10/10 despite change to Dilaudid will increase to 4mg q4h prn with iv Dilaudid breakthrough  - Neurontin increased to 800mg q8h Monitor neuro status/ lethargy   - I have reviewed with pt to not wait to long for dose of pain medication to  prevent pain from escalating   - Meds reviewed despite being in constant pain pt not taking Dilaudid as often as he can often with 10/10 pain Dilaudid will be every 4 hours with parameters and pt can refuse Will reconsult pain mgt again for any recommendations on Monday   - Pain management reconsulted , recs to stop Dilaudid PO ATC , continue Dilaudid for breakthrough pain with dressing changes, increase gabapentin to 900 mg Q8, add Cymbalta 30 mg QD and Flexeril 5mg Q8    CARDIOVASCULAR  # HTN   - HTN noted in ICU requiring cardene and esmolol GTTs   - Lisinopril dc'ed to increase catapres   - Continue on norvasc 10, catapres weaned off 4/3  - Monitor BP     # AFIB   - Hx of pAFIB   - No RVR episodes or pAFIB episodes in ICU   - No rate control medications needed  - Monitor on telemetry     # RV dilation second to PHTN   - POCUS on arrival to [facility] with RVE concern   - TTE 10/2024 with EF 55-60 with normal LVSF, moderate LVH and normal RVSF and size with no concern for pHTN   - TTE on 2/25 at  with EF 61 and normal LVSF, but enlarged RV size with reduced RVSF, mild TR, mild PHTN with PASP 49, and dilated IVC to 3.4cn, but normal TAPSE 2.1.  - Continue on aggressive diuresis in ICU    - TTE 3/11 with RVSF reduced with TAPSE 1.6. IVC improved to 2.12cm.   - Lasix 40 PO QD discontinued 4/7 secondary to ELLA  - Monitor IO/ BMP    RESPIRATORY  # ARDS second to MFPNA   - Hx of BEA on CPAP presented to  post URI with SOB and found with AHRF with progression to ARDS second to post viral MFPNA   - Intubated 2/25   - Cannulated for vvECMO 2/26   - s/p 60XLTCP tracheostomy 3/7   - Decannulated 3/7   - CTSx removed tracheostomy and ECMO sutures on 3/17   - Continue on TC QD with PMV as able with strong phonation  - Continue on nebs and HTS   -  trials continue and now trialing overnight with BIPAP for OHS (10/8/12)  - Continue on PS 18/10/40 QHS given OHS   - Decannulated 3/28   - c/w nasal cannula 2L     GI  # Dysphagia   - s/p PEG 3/7   - Attempted green dye on 3/13 and failed  - Continue on TF  - Continue on miralax and senna  - RPT green dye passed 3/19  - 3/20 Passed FEEST - on soft bite sized with thin liquids Added consistent carb 2/2 weight/Elevated A1c   - plan for OR 4/15 for PEG removal with cardiothoracic surgery   - Hold Eliquis held for 72 hours last dose 4/12 AM - will begin Heparin drip on Saturday to bridge to procedure     # Mild transaminitis   - LFTs elevated in ICU with TBILI elevated likely from ECMO hemolysis   - US ABD with hepatic steatosis   - Trend LFTs      RENAL  # ELLA   - ELLA likely from cardiorenal with RVE   - Diuresed in ICU and improved   - Monitor renal function and UOP   - Recurrent ELLA noted suspected in s/o vancomycin toxicity (4/7)   - UA negative and Urine Studies FeNa 0.9 % suggest pre-renal etiology  - Lasix discontinued for now  - s/p IV fluid trial on 4/8 with minimal improvement  - Trialing additional fluids on 4/10   - Trend BMP and I/Os    # Hypernatremia (resolved)  - Hypernatremia with fever spikes   - Fever curve improved and attempting to wean FWF   - now off FWF    INFECTIOUS DISEASE  # Recurrent fevers with fungemia from unknown source   - Recurrent fevers post ECMO decannulation thought initially to be cytokine surge   - BCx, SCx, UCx, RVP and MRSA RPT on 3/12 negative   - Completed zosyn (3/12-3/18) empirically with improved WBC , however recurrent fevers noted.   - RPT SCx, UA, CXR and RVP negative  - TTE 3/17 with no IE  - PanCT 3/17 with PNA and persistent panniculitis  - BCx 3/15 and 3/16 with candida albicans, rpt BCx 3/18 and 3/20 negative   - Concern for possible source from panniculitis   - Continue on Caspofungin 70 mg daily 3/17 - 4/15, ID consult appreciated   - 3/30 spiked temp cx's sent + ua and c/s pending Bcx neg to date - started on vanco /zosyn ID following  - Vanco stopped (4/7) due to ELLA concern for Vanco toxicty and continued on empiric Zosyn/ Caspofungin   - 4/3 - persistent fevers, leukocytosis. Will repeat CT C/A/P to evaluate for source - no source found - Bilat lung opacities mildly decreased     - 4/8 bone scan: : "1. Large area of soft tissue infection in the R side of the pelvis as detailed above. Slightly more intense activity in the R iliac bone compared to the L raises possibility of OM. 2. Intense activity in the lateral aspect of the R forefoot compatible with soft tissue infection; complicating osteomyelitis is not excluded. Milder activity in the medial aspect of the L forefoot may reflect soft tissue infection."  - ID consult appreciated. Continue on Zosyn 3.375 g 8, plan on 6 week course for possible OM until 5/11 and Caspofungin 70 mg daily, will plan on 4 week course ending 4/15  - MR Pelvis Bony (4/11): No osteomyelitis. Sacral decubitus wound is again seen extending superiorly along the superficial margin of the right gluteus zurdo to the level of L5 with small amount of fluid and gas within the tract. The underlying gluteus zurdo demonstrates a region of decreased enhancement consistent with ischemia or necrosis measuring up to 11.9 cm transversely. Muscle edema suggests a nonspecific myositis. Small bilateral hip joint effusions with mild synovitis is nonspecific.    # MFPNA and abdominal pannus cellulitis   - Presented to  post URI with SOB and found with AHRF with progression to ARDS second to post viral MFPNA   - RVP, legionella, strept, BAL, BCx, UCx, HIV, PCP, and adenovirus PCR negative   - Initially started on multiple agents in ICU and ultimately completed zosyn (2/26-3/9) ZMAX (2/25-2/26) and vanco (2/26-3/1)     # R/o Sacral OM  - Noted with hard indurated sacral wound by Wound Care Team  - CT Pelvis (3/29) revealing superficial skin thickening overlying the sacrum with underlying edema of the subcutaneous fat, in keeping with the clinical history of sacral wound. No evidence of underlying tract or drainable fluid collection.  - CT 4/3 - no fluid collection in sacral tissue   - Given ongoing fever with no clear source with obtain MRI Pelvis per ID recs to r/o sacral OM once renal fx improves    # Penile discharge   - GC/ chlamydia negative   - HIV negative   - Monitor for now    # PPX   - MRSA PCR positive and completed bactroban (2/26-3/3)     HEME   # Anemia likely second to ECMO circuit vs AOCD   - HH low in ICU second to ECMO circuit   - HH dropping again today however no bleeding noted   - Microcytic and hemochromic, sent anemia panel 3/19  - Trend HH     VASCULAR   # R IJ and BL LE DVTs   - Post ECMO dopplers on 3/9 negative for BL UE/ LE DVTs.   - Subsequent post ECMO dopplers performed on 3/14 and noted with acute, non-occlusive deep vein thrombosis noted within the right internal jugular vein. Acute, occlusive deep vein thromboses noted within the right and left peroneal veins at both mid calves, and age-indeterminate, occlusive deep vein thrombosis visualized within the left gastrocnemius vein at the proximal calf.     - Continue on argatroban GTT and changed to Eliquis   - last dose of Eliquis Saturday morning 4/12 in AM in preparation for PEG removal, plan to begin heparin drip     PODIATRY   # BL plantar feet blisters likely pressors induced  - Seen by podiatry and blisters lanced on 3/4 and again on 3/18  - Continue on local wound care per podiatry   - Podiatry following  - OK for WBAT will fu with PT for offloading shoe if appropriate  - Pain mgt consult   - Podiatry FU 3/28 / 4/3 done /following     ENDOCRINE  # Pre-DM2   - A1C 6.7  - Continue on ISS   - Monitor FS   - IF no demand then stop ISS     LINES/ TUBES  - R IJ and R FEM ECMO (2/26-3/7)     SKIN  # Pannus canndial intertrigo   # Sacrum/ buttock MAD  - Continue on clotrimazole cream   - WOC appreciated   -Seen by WC pt noted to have oozing from Buttock wound surgicel placed by wound care On follow up no further bleeding  - Wound care placed orders   - No bleeding from wound     ETHICS/ GOC    - FULL CODE     DISPO - PT following  Seen by PMR for acute rehab per recs   38 YO M with PMHx of morbid obesity, BEA on CPAP, pAFIB (not on AC), HTN, and BL papilledema attributed to pseudotumor cerebri who presented initially to [facility] on 2/24 with SOB and BL LE edema. Found with URI outpatient with progressive SOB, and concern for noted for MFPNA on imaging. Course progressed with AHRF with worsening O2 demand, respiratory distress, secretions, and somnolence requiring intubation (difficult with success after 6 attempts) in ED and admission to [facility] MICU. While in MICU, patient noted with increasing O2 demand with no improvement despite optimal vent management. Concern noted for progressive ARDS, unable to prone given morbid obesity, and ultimately cannulated for vvECMO on 2/25 and transferred to [facility] for further management on 2/26. While at [facility], tx with diuresis and ABX, and ultimately decannulated and s/p 60XLTCP trach and PEG on 3/7. Patient ultimately transferred to [facility] on 3/11 and course complicated by recurrent high grade fevers and found with fungemia.    NEUROLOGY  # Hx of Pseudotumor Cerebri   - s/p LP in 2024 with high opening pressure  - s/p MRI 2024 with possible pituitary mass but unclear because of pressure effect from pseudotumor cerebri causing partially empty sella.  - Prolactin elevated   - ACTH low but received steroids during admission   - FT4 low normal and TSH normal, but given FT4 low normal TSH should be higher   - Discuss endocrine consult for inhouse work up vs outpatient.     # Sedation   - Weaned off sedation in ICU   - Nimbex turned off 2/27   - Prop turned off 3/9   - Precedex turned off and catapres started 3/11  - catapres 0.2 TID (decreased 3/25) - tapered down 3/31 to 0.1 tid - discontinued 4/3     # Insomnia   - Patient worked night shift x 15 years   - Trouble sleeping at night leading to day time sleepiness and poor participation with PT  - Continue on Seroquel 25 QHS (increased 3/18)   - Continue on melatonin   - Continue on Neurontin as below    # BL LE neuropathy   - CIPN concern   - Neurontin increased to 300mg Q8H with relief   - added Percocet Q4H for moderate pain (3/25) and continue on Dilaudid Q6H for severe pain   - Pain mgt called to consult - recs for tylenol ATC, NEurontin increased to 400mg q6, Oxycodone 5 q4h prn , Dilaudid for BREAKTHROUGH pain only, Lidocaine patches on each leg, ice prn to feet  - Some improvement in pain - PT consult for TENS,   - oxycodone inc to 10mg as pt with no relief /buttocks area indurated /tender CT ordered -no abscess  - C/w Gabapentin : 600/600/800 and up titrate as required  - Switched to PO Dilaudid 2mg Q6H PRN moderate-severe and IV Dilaudid PRN severe breakthrough and with dressing changes   - Still with severe pain 10/10 despite change to Dilaudid will increase to 4mg q4h prn with iv Dilaudid breakthrough  - Neurontin increased to 800mg q8h Monitor neuro status/ lethargy   - I have reviewed with pt to not wait to long for dose of pain medication to  prevent pain from escalating   - Meds reviewed despite being in constant pain pt not taking Dilaudid as often as he can often with 10/10 pain Dilaudid will be every 4 hours with parameters and pt can refuse Will reconsult pain mgt again for any recommendations on Monday   - Pain management reconsulted , recs to stop Dilaudid PO ATC , continue Dilaudid for breakthrough pain with dressing changes, increase gabapentin to 900 mg Q8, add Cymbalta 30 mg QD and Flexeril 5mg Q8    CARDIOVASCULAR  # HTN   - HTN noted in ICU requiring cardene and esmolol GTTs   - Lisinopril dc'ed to increase catapres   - Continue on norvasc 10, catapres weaned off 4/3  - Monitor BP     # AFIB   - Hx of pAFIB   - No RVR episodes or pAFIB episodes in ICU   - No rate control medications needed  - Monitor on telemetry     # RV dilation second to PHTN   - POCUS on arrival to [facility] with RVE concern   - TTE 10/2024 with EF 55-60 with normal LVSF, moderate LVH and normal RVSF and size with no concern for pHTN   - TTE on 2/25 at  with EF 61 and normal LVSF, but enlarged RV size with reduced RVSF, mild TR, mild PHTN with PASP 49, and dilated IVC to 3.4cn, but normal TAPSE 2.1.  - Continue on aggressive diuresis in ICU    - TTE 3/11 with RVSF reduced with TAPSE 1.6. IVC improved to 2.12cm.   - Lasix 40 PO QD discontinued 4/7 secondary to ELLA  - Monitor IO/ BMP    RESPIRATORY  # ARDS second to MFPNA   - Hx of BEA on CPAP presented to  post URI with SOB and found with AHRF with progression to ARDS second to post viral MFPNA   - Intubated 2/25   - Cannulated for vvECMO 2/26   - s/p 60XLTCP tracheostomy 3/7   - Decannulated 3/7   - CTSx removed tracheostomy and ECMO sutures on 3/17   - Continue on TC QD with PMV as able with strong phonation  - Continue on nebs and HTS   -  trials continue and now trialing overnight with BIPAP for OHS (10/8/12)  - Continue on PS 18/10/40 QHS given OHS   - Decannulated 3/28   - c/w nasal cannula 2L     GI  # Dysphagia   - s/p PEG 3/7   - Attempted green dye on 3/13 and failed  - Continue on TF  - Continue on miralax and senna  - RPT green dye passed 3/19  - 3/20 Passed FEEST - on soft bite sized with thin liquids Added consistent carb 2/2 weight/Elevated A1c   - plan for OR 4/15 for PEG removal with cardiothoracic surgery   - Hold Eliquis held for 72 hours last dose 4/12 AM - will begin Heparin drip to start today for bridge to procedure     # Mild transaminitis   - LFTs elevated in ICU with TBILI elevated likely from ECMO hemolysis   - US ABD with hepatic steatosis   - Trend LFTs      RENAL  # ELLA   - ELLA likely from cardiorenal with RVE   - Diuresed in ICU and improved   - Monitor renal function and UOP   - Recurrent ELLA noted suspected in s/o vancomycin toxicity (4/7)   - UA negative and Urine Studies FeNa 0.9 % suggest pre-renal etiology  - Lasix discontinued for now  - s/p IV fluid trial on 4/8 with minimal improvement  - Trialing additional fluids on 4/10   - Trend BMP and I/Os    # Hypernatremia (resolved)  - Hypernatremia with fever spikes   - Fever curve improved and attempting to wean FWF   - now off FWF    INFECTIOUS DISEASE  # Recurrent fevers with fungemia from unknown source   - Recurrent fevers post ECMO decannulation thought initially to be cytokine surge   - BCx, SCx, UCx, RVP and MRSA RPT on 3/12 negative   - Completed zosyn (3/12-3/18) empirically with improved WBC , however recurrent fevers noted.   - RPT SCx, UA, CXR and RVP negative  - TTE 3/17 with no IE  - PanCT 3/17 with PNA and persistent panniculitis  - BCx 3/15 and 3/16 with candida albicans, rpt BCx 3/18 and 3/20 negative   - Concern for possible source from panniculitis   - Continue on Caspofungin 70 mg daily 3/17 - 4/15, ID consult appreciated   - 3/30 spiked temp cx's sent + ua and c/s pending Bcx neg to date - started on vanco /zosyn ID following  - Vanco stopped (4/7) due to ELLA concern for Vanco toxicty and continued on empiric Zosyn/ Caspofungin   - 4/3 - persistent fevers, leukocytosis. Will repeat CT C/A/P to evaluate for source - no source found - Bilat lung opacities mildly decreased     - 4/8 bone scan: : "1. Large area of soft tissue infection in the R side of the pelvis as detailed above. Slightly more intense activity in the R iliac bone compared to the L raises possibility of OM. 2. Intense activity in the lateral aspect of the R forefoot compatible with soft tissue infection; complicating osteomyelitis is not excluded. Milder activity in the medial aspect of the L forefoot may reflect soft tissue infection."  - ID consult appreciated. Continue on Zosyn 3.375 g 8, plan on 6 week course for possible OM until 5/11 and Caspofungin 70 mg daily, will plan on 4 week course ending 4/15  - MR Pelvis Bony (4/11): No osteomyelitis. Sacral decubitus wound is again seen extending superiorly along the superficial margin of the right gluteus zurdo to the level of L5 with small amount of fluid and gas within the tract. The underlying gluteus zurdo demonstrates a region of decreased enhancement consistent with ischemia or necrosis measuring up to 11.9 cm transversely. Muscle edema suggests a nonspecific myositis. Small bilateral hip joint effusions with mild synovitis is nonspecific.    # MFPNA and abdominal pannus cellulitis   - Presented to  post URI with SOB and found with AHRF with progression to ARDS second to post viral MFPNA   - RVP, legionella, strept, BAL, BCx, UCx, HIV, PCP, and adenovirus PCR negative   - Initially started on multiple agents in ICU and ultimately completed zosyn (2/26-3/9) ZMAX (2/25-2/26) and vanco (2/26-3/1)     # R/o Sacral OM  - Noted with hard indurated sacral wound by Wound Care Team  - CT Pelvis (3/29) revealing superficial skin thickening overlying the sacrum with underlying edema of the subcutaneous fat, in keeping with the clinical history of sacral wound. No evidence of underlying tract or drainable fluid collection.  - CT 4/3 - no fluid collection in sacral tissue   - Given ongoing fever with no clear source with obtain MRI Pelvis per ID recs to r/o sacral OM once renal fx improves    # Penile discharge   - GC/ chlamydia negative   - HIV negative   - Monitor for now    # PPX   - MRSA PCR positive and completed bactroban (2/26-3/3)     HEME   # Anemia likely second to ECMO circuit vs AOCD   - HH low in ICU second to ECMO circuit   - HH dropping again today however no bleeding noted   - Microcytic and hemochromic, sent anemia panel 3/19  - Trend HH     VASCULAR   # R IJ and BL LE DVTs   - Post ECMO dopplers on 3/9 negative for BL UE/ LE DVTs.   - Subsequent post ECMO dopplers performed on 3/14 and noted with acute, non-occlusive deep vein thrombosis noted within the right internal jugular vein. Acute, occlusive deep vein thromboses noted within the right and left peroneal veins at both mid calves, and age-indeterminate, occlusive deep vein thrombosis visualized within the left gastrocnemius vein at the proximal calf.     - Continue on argatroban GTT and changed to Eliquis   - last dose of Eliquis Saturday morning 4/12 in AM in preparation for PEG removal, plan to begin heparin drip today    PODIATRY   # BL plantar feet blisters likely pressors induced  - Seen by podiatry and blisters lanced on 3/4 and again on 3/18  - Continue on local wound care per podiatry   - Podiatry following  - OK for WBAT will fu with PT for offloading shoe if appropriate  - Pain mgt consult   - Podiatry FU 3/28 / 4/3 done /following     ENDOCRINE  # Pre-DM2   - A1C 6.7  - Continue on ISS   - Monitor FS   - IF no demand then stop ISS     LINES/ TUBES  - R IJ and R FEM ECMO (2/26-3/7)     SKIN  # Pannus canndial intertrigo   # Sacrum/ buttock MAD  - Continue on clotrimazole cream   - WOC appreciated   -Seen by WC pt noted to have oozing from Buttock wound surgicel placed by wound care On follow up no further bleeding  - Wound care placed orders   - No bleeding from wound     ETHICS/ GOC    - FULL CODE     DISPO - PT following  Seen by PMR for acute rehab per recs

## 2025-04-13 LAB
APTT BLD: 36.5 SEC — HIGH (ref 24.5–35.6)
APTT BLD: 38.5 SEC — HIGH (ref 24.5–35.6)
APTT BLD: 53.9 SEC — HIGH (ref 24.5–35.6)
GLUCOSE BLDC GLUCOMTR-MCNC: 105 MG/DL — HIGH (ref 70–99)
GLUCOSE BLDC GLUCOMTR-MCNC: 95 MG/DL — SIGNIFICANT CHANGE UP (ref 70–99)
GLUCOSE BLDC GLUCOMTR-MCNC: 96 MG/DL — SIGNIFICANT CHANGE UP (ref 70–99)
GLUCOSE BLDC GLUCOMTR-MCNC: 98 MG/DL — SIGNIFICANT CHANGE UP (ref 70–99)
HCT VFR BLD CALC: 27.3 % — LOW (ref 39–50)
HGB BLD-MCNC: 7.8 G/DL — LOW (ref 13–17)
MCHC RBC-ENTMCNC: 25.5 PG — LOW (ref 27–34)
MCHC RBC-ENTMCNC: 28.6 G/DL — LOW (ref 32–36)
MCV RBC AUTO: 89.2 FL — SIGNIFICANT CHANGE UP (ref 80–100)
NRBC # BLD AUTO: 0 K/UL — SIGNIFICANT CHANGE UP (ref 0–0)
NRBC # FLD: 0 K/UL — SIGNIFICANT CHANGE UP (ref 0–0)
NRBC BLD AUTO-RTO: 0 /100 WBCS — SIGNIFICANT CHANGE UP (ref 0–0)
PLATELET # BLD AUTO: 500 K/UL — HIGH (ref 150–400)
RBC # BLD: 3.06 M/UL — LOW (ref 4.2–5.8)
RBC # FLD: 21.7 % — HIGH (ref 10.3–14.5)
WBC # BLD: 10.41 K/UL — SIGNIFICANT CHANGE UP (ref 3.8–10.5)
WBC # FLD AUTO: 10.41 K/UL — SIGNIFICANT CHANGE UP (ref 3.8–10.5)

## 2025-04-13 RX ORDER — GABAPENTIN 400 MG/1
900 CAPSULE ORAL EVERY 8 HOURS
Refills: 0 | Status: DISCONTINUED | OUTPATIENT
Start: 2025-04-13 | End: 2025-04-22

## 2025-04-13 RX ORDER — HYDROMORPHONE/SOD CHLOR,ISO/PF 2 MG/10 ML
0.5 SYRINGE (ML) INJECTION ONCE
Refills: 0 | Status: DISCONTINUED | OUTPATIENT
Start: 2025-04-13 | End: 2025-04-13

## 2025-04-13 RX ORDER — ACETAMINOPHEN 500 MG/5ML
1000 LIQUID (ML) ORAL ONCE
Refills: 0 | Status: COMPLETED | OUTPATIENT
Start: 2025-04-13 | End: 2025-04-13

## 2025-04-13 RX ADMIN — SODIUM HYPOCHLORITE 1 APPLICATION(S): 0.12 SOLUTION TOPICAL at 17:24

## 2025-04-13 RX ADMIN — Medication 0.5 MILLIGRAM(S): at 23:40

## 2025-04-13 RX ADMIN — Medication 25 GRAM(S): at 05:49

## 2025-04-13 RX ADMIN — Medication 650 MILLIGRAM(S): at 08:05

## 2025-04-13 RX ADMIN — SEVELAMER HYDROCHLORIDE 800 MILLIGRAM(S): 800 TABLET ORAL at 12:01

## 2025-04-13 RX ADMIN — Medication 1 TABLET(S): at 11:35

## 2025-04-13 RX ADMIN — HEPARIN SODIUM 3200 UNIT(S)/HR: 1000 INJECTION INTRAVENOUS; SUBCUTANEOUS at 18:22

## 2025-04-13 RX ADMIN — QUETIAPINE FUMARATE 25 MILLIGRAM(S): 25 TABLET ORAL at 21:11

## 2025-04-13 RX ADMIN — LIDOCAINE HYDROCHLORIDE 1 PATCH: 20 JELLY TOPICAL at 19:59

## 2025-04-13 RX ADMIN — Medication 25 GRAM(S): at 21:12

## 2025-04-13 RX ADMIN — LIDOCAINE HYDROCHLORIDE 1 PATCH: 20 JELLY TOPICAL at 11:34

## 2025-04-13 RX ADMIN — Medication 0.5 MILLIGRAM(S): at 23:09

## 2025-04-13 RX ADMIN — SEVELAMER HYDROCHLORIDE 800 MILLIGRAM(S): 800 TABLET ORAL at 16:37

## 2025-04-13 RX ADMIN — MUPIROCIN CALCIUM 1 APPLICATION(S): 20 CREAM TOPICAL at 17:26

## 2025-04-13 RX ADMIN — Medication 0.5 MILLIGRAM(S): at 16:36

## 2025-04-13 RX ADMIN — Medication 400 MILLIGRAM(S): at 18:21

## 2025-04-13 RX ADMIN — LIDOCAINE HYDROCHLORIDE 1 PATCH: 20 JELLY TOPICAL at 23:00

## 2025-04-13 RX ADMIN — MUPIROCIN CALCIUM 1 APPLICATION(S): 20 CREAM TOPICAL at 05:50

## 2025-04-13 RX ADMIN — SODIUM HYPOCHLORITE 1 APPLICATION(S): 0.12 SOLUTION TOPICAL at 05:49

## 2025-04-13 RX ADMIN — CLOTRIMAZOLE 1 APPLICATION(S): 1 CREAM TOPICAL at 05:50

## 2025-04-13 RX ADMIN — Medication 1 APPLICATION(S): at 05:49

## 2025-04-13 RX ADMIN — DULOXETINE 30 MILLIGRAM(S): 20 CAPSULE, DELAYED RELEASE ORAL at 11:33

## 2025-04-13 RX ADMIN — HEPARIN SODIUM 3200 UNIT(S)/HR: 1000 INJECTION INTRAVENOUS; SUBCUTANEOUS at 19:28

## 2025-04-13 RX ADMIN — Medication 650 MILLIGRAM(S): at 08:35

## 2025-04-13 RX ADMIN — CLOTRIMAZOLE 1 APPLICATION(S): 1 CREAM TOPICAL at 17:25

## 2025-04-13 RX ADMIN — Medication 25 GRAM(S): at 13:01

## 2025-04-13 RX ADMIN — Medication 40 MILLIGRAM(S): at 05:48

## 2025-04-13 RX ADMIN — Medication 3 MILLIGRAM(S): at 21:11

## 2025-04-13 RX ADMIN — CYCLOBENZAPRINE HYDROCHLORIDE 5 MILLIGRAM(S): 15 CAPSULE, EXTENDED RELEASE ORAL at 08:07

## 2025-04-13 RX ADMIN — GABAPENTIN 900 MILLIGRAM(S): 400 CAPSULE ORAL at 13:01

## 2025-04-13 RX ADMIN — SEVELAMER HYDROCHLORIDE 800 MILLIGRAM(S): 800 TABLET ORAL at 08:05

## 2025-04-13 RX ADMIN — Medication 0.5 MILLIGRAM(S): at 09:43

## 2025-04-13 RX ADMIN — Medication 0.5 MILLIGRAM(S): at 04:53

## 2025-04-13 RX ADMIN — CASPOFUNGIN ACETATE 260 MILLIGRAM(S): 5 INJECTION, POWDER, LYOPHILIZED, FOR SOLUTION INTRAVENOUS at 04:23

## 2025-04-13 RX ADMIN — Medication 0.5 MILLIGRAM(S): at 04:23

## 2025-04-13 RX ADMIN — HEPARIN SODIUM 2900 UNIT(S)/HR: 1000 INJECTION INTRAVENOUS; SUBCUTANEOUS at 10:20

## 2025-04-13 RX ADMIN — AMLODIPINE BESYLATE 10 MILLIGRAM(S): 10 TABLET ORAL at 05:49

## 2025-04-13 RX ADMIN — HEPARIN SODIUM 2400 UNIT(S)/HR: 1000 INJECTION INTRAVENOUS; SUBCUTANEOUS at 06:21

## 2025-04-13 RX ADMIN — GABAPENTIN 900 MILLIGRAM(S): 400 CAPSULE ORAL at 21:12

## 2025-04-13 RX ADMIN — Medication 0.5 MILLIGRAM(S): at 10:13

## 2025-04-13 RX ADMIN — GABAPENTIN 800 MILLIGRAM(S): 400 CAPSULE ORAL at 05:48

## 2025-04-13 NOTE — PROGRESS NOTE ADULT - NS ATTEND AMEND GEN_ALL_CORE FT
40YO Male with obesity (Class III, BMI 69), pAfib (no AC), HTN, BEA (2019 AHI 34 non-compliant on CPAP [id number]), and history of b/l papilledema attributed to pseudotumor cerebri initially presenting as a transfer [facility] on 2/26 for acute hypoxemic respiratory failure s/p ETT intubation/MV with progression to refractory ARDS s/p initiation of vv-ECMO support (2/26-3/7) with failure to wean from ventilator s/p tracheostomy placement, further found to have RV failure s/p aggressive diuresis and PNA followed by severe sepsis 2/2 panniculitis c/b candida albicans fungemia now transferred to [facility] for further medical management.     #Acute hypoxemic and hypercapnic respiratory failure s/p tracheostomy placement   - Decannulated 3/28, continues to use Bilevel NIV qhs    #RV failure 2/2 pulmHTN   - Maintaining euvolemia     #AFib - rate controleld    #DVT right IJ and bilateral peroneal veins - AC for DVT and AFib    #Plan for removal of PEG   - Last dose of Eliquis 6am 4/12, currently on Heparin drip    #Transaminitis improving  -Hepatomegaly will need to follow as outpatient    #Fungemia  #Sacral ulceration with induration  - NM WBC scan performed overnight concerning for active infection with possible osteomyelitis of right pelvis/iliac bone.   Evaluated by wound care team again at bedside 4/10, recommended MRI of pelvis to evaluate extent of deep tunnelling. Pt will need long-term Abx ~4 weeks through 5/11 and completion of caspofungin 4/15.     Neuropathic pain  Evaluated by chronic pain service last week as patient continues to have uncontrolled pain in the LE - Gabapentin being uptitrated (900 mg TID today), Flexeril and Cymbalta. Dilaudid for dressing changes.     DVT ppx: heparin gtt  Code status: Full code

## 2025-04-13 NOTE — PROGRESS NOTE ADULT - ASSESSMENT
38 YO M with PMHx of morbid obesity, BEA on CPAP, pAFIB (not on AC), HTN, and BL papilledema attributed to pseudotumor cerebri who presented initially to [facility] on 2/24 with SOB and BL LE edema. Found with URI outpatient with progressive SOB, and concern for noted for MFPNA on imaging. Course progressed with AHRF with worsening O2 demand, respiratory distress, secretions, and somnolence requiring intubation (difficult with success after 6 attempts) in ED and admission to [facility] MICU. While in MICU, patient noted with increasing O2 demand with no improvement despite optimal vent management. Concern noted for progressive ARDS, unable to prone given morbid obesity, and ultimately cannulated for vvECMO on 2/25 and transferred to [facility] for further management on 2/26. While at [facility], tx with diuresis and ABX, and ultimately decannulated and s/p 60XLTCP trach and PEG on 3/7. Patient ultimately transferred to [facility] on 3/11 and course complicated by recurrent high grade fevers and found with fungemia.    NEUROLOGY  # Hx of Pseudotumor Cerebri   - s/p LP in 2024 with high opening pressure  - s/p MRI 2024 with possible pituitary mass but unclear because of pressure effect from pseudotumor cerebri causing partially empty sella.  - Prolactin elevated   - ACTH low but received steroids during admission   - FT4 low normal and TSH normal, but given FT4 low normal TSH should be higher   - Discuss endocrine consult for inhouse work up vs outpatient.     # Sedation   - Weaned off sedation in ICU   - Nimbex turned off 2/27   - Prop turned off 3/9   - Precedex turned off and catapres started 3/11  - catapres 0.2 TID (decreased 3/25) - tapered down 3/31 to 0.1 tid - discontinued 4/3     # Insomnia   - Patient worked night shift x 15 years   - Trouble sleeping at night leading to day time sleepiness and poor participation with PT  - Continue on Seroquel 25 QHS (increased 3/18)   - Continue on melatonin   - Continue on Neurontin as below    # BL LE neuropathy   - CIPN concern   - Neurontin increased to 300mg Q8H with relief   - added Percocet Q4H for moderate pain (3/25) and continue on Dilaudid Q6H for severe pain   - Pain mgt called to consult - recs for tylenol ATC, NEurontin increased to 400mg q6, Oxycodone 5 q4h prn , Dilaudid for BREAKTHROUGH pain only, Lidocaine patches on each leg, ice prn to feet  - Some improvement in pain - PT consult for TENS,   - oxycodone inc to 10mg as pt with no relief /buttocks area indurated /tender CT ordered -no abscess  - C/w Gabapentin : 600/600/800 and up titrate as required  - Switched to PO Dilaudid 2mg Q6H PRN moderate-severe and IV Dilaudid PRN severe breakthrough and with dressing changes   - Still with severe pain 10/10 despite change to Dilaudid will increase to 4mg q4h prn with iv Dilaudid breakthrough  - Neurontin increased to 800mg q8h Monitor neuro status/ lethargy   - I have reviewed with pt to not wait to long for dose of pain medication to  prevent pain from escalating   - Meds reviewed despite being in constant pain pt not taking Dilaudid as often as he can often with 10/10 pain Dilaudid will be every 4 hours with parameters and pt can refuse Will reconsult pain mgt again for any recommendations on Monday   - Pain management reconsulted , recs to stop Dilaudid PO ATC , continue Dilaudid for breakthrough pain with dressing changes, increase gabapentin to 900 mg Q8, add Cymbalta 30 mg QD and Flexeril 5mg Q8    CARDIOVASCULAR  # HTN   - HTN noted in ICU requiring cardene and esmolol GTTs   - Lisinopril dc'ed to increase catapres   - Continue on norvasc 10, catapres weaned off 4/3  - Monitor BP     # AFIB   - Hx of pAFIB   - No RVR episodes or pAFIB episodes in ICU   - No rate control medications needed  - Monitor on telemetry     # RV dilation second to PHTN   - POCUS on arrival to [facility] with RVE concern   - TTE 10/2024 with EF 55-60 with normal LVSF, moderate LVH and normal RVSF and size with no concern for pHTN   - TTE on 2/25 at  with EF 61 and normal LVSF, but enlarged RV size with reduced RVSF, mild TR, mild PHTN with PASP 49, and dilated IVC to 3.4cn, but normal TAPSE 2.1.  - Continue on aggressive diuresis in ICU    - TTE 3/11 with RVSF reduced with TAPSE 1.6. IVC improved to 2.12cm.   - Lasix 40 PO QD discontinued 4/7 secondary to ELLA  - Monitor IO/ BMP    RESPIRATORY  # ARDS second to MFPNA   - Hx of BEA on CPAP presented to  post URI with SOB and found with AHRF with progression to ARDS second to post viral MFPNA   - Intubated 2/25   - Cannulated for vvECMO 2/26   - s/p 60XLTCP tracheostomy 3/7   - Decannulated 3/7   - CTSx removed tracheostomy and ECMO sutures on 3/17   - Continue on TC QD with PMV as able with strong phonation  - Continue on nebs and HTS   -  trials continue and now trialing overnight with BIPAP for OHS (10/8/12)  - Continue on PS 18/10/40 QHS given OHS   - Decannulated 3/28   - c/w nasal cannula 2L     GI  # Dysphagia   - s/p PEG 3/7   - Attempted green dye on 3/13 and failed  - Continue on TF  - Continue on miralax and senna  - RPT green dye passed 3/19  - 3/20 Passed FEEST - on soft bite sized with thin liquids Added consistent carb 2/2 weight/Elevated A1c   - plan for OR 4/15 for PEG removal with cardiothoracic surgery   - Hold Eliquis held for 72 hours last dose 4/12 AM - will begin Heparin drip to start today for bridge to procedure     # Mild transaminitis   - LFTs elevated in ICU with TBILI elevated likely from ECMO hemolysis   - US ABD with hepatic steatosis   - Trend LFTs      RENAL  # ELLA   - ELLA likely from cardiorenal with RVE   - Diuresed in ICU and improved   - Monitor renal function and UOP   - Recurrent ELLA noted suspected in s/o vancomycin toxicity (4/7)   - UA negative and Urine Studies FeNa 0.9 % suggest pre-renal etiology  - Lasix discontinued for now  - s/p IV fluid trial on 4/8 with minimal improvement  - Trialing additional fluids on 4/10   - Trend BMP and I/Os    # Hypernatremia (resolved)  - Hypernatremia with fever spikes   - Fever curve improved and attempting to wean FWF   - now off FWF    INFECTIOUS DISEASE  # Recurrent fevers with fungemia from unknown source   - Recurrent fevers post ECMO decannulation thought initially to be cytokine surge   - BCx, SCx, UCx, RVP and MRSA RPT on 3/12 negative   - Completed zosyn (3/12-3/18) empirically with improved WBC , however recurrent fevers noted.   - RPT SCx, UA, CXR and RVP negative  - TTE 3/17 with no IE  - PanCT 3/17 with PNA and persistent panniculitis  - BCx 3/15 and 3/16 with candida albicans, rpt BCx 3/18 and 3/20 negative   - Concern for possible source from panniculitis   - Continue on Caspofungin 70 mg daily 3/17 - 4/15, ID consult appreciated   - 3/30 spiked temp cx's sent + ua and c/s pending Bcx neg to date - started on vanco /zosyn ID following  - Vanco stopped (4/7) due to ELLA concern for Vanco toxicty and continued on empiric Zosyn/ Caspofungin   - 4/3 - persistent fevers, leukocytosis. Will repeat CT C/A/P to evaluate for source - no source found - Bilat lung opacities mildly decreased     - 4/8 bone scan: : "1. Large area of soft tissue infection in the R side of the pelvis as detailed above. Slightly more intense activity in the R iliac bone compared to the L raises possibility of OM. 2. Intense activity in the lateral aspect of the R forefoot compatible with soft tissue infection; complicating osteomyelitis is not excluded. Milder activity in the medial aspect of the L forefoot may reflect soft tissue infection."  - ID consult appreciated. Continue on Zosyn 3.375 g 8, plan on 6 week course for possible OM until 5/11 and Caspofungin 70 mg daily, will plan on 4 week course ending 4/15  - MR Pelvis Bony (4/11): No osteomyelitis. Sacral decubitus wound is again seen extending superiorly along the superficial margin of the right gluteus zurdo to the level of L5 with small amount of fluid and gas within the tract. The underlying gluteus zurdo demonstrates a region of decreased enhancement consistent with ischemia or necrosis measuring up to 11.9 cm transversely. Muscle edema suggests a nonspecific myositis. Small bilateral hip joint effusions with mild synovitis is nonspecific.    # MFPNA and abdominal pannus cellulitis   - Presented to  post URI with SOB and found with AHRF with progression to ARDS second to post viral MFPNA   - RVP, legionella, strept, BAL, BCx, UCx, HIV, PCP, and adenovirus PCR negative   - Initially started on multiple agents in ICU and ultimately completed zosyn (2/26-3/9) ZMAX (2/25-2/26) and vanco (2/26-3/1)     # R/o Sacral OM  - Noted with hard indurated sacral wound by Wound Care Team  - CT Pelvis (3/29) revealing superficial skin thickening overlying the sacrum with underlying edema of the subcutaneous fat, in keeping with the clinical history of sacral wound. No evidence of underlying tract or drainable fluid collection.  - CT 4/3 - no fluid collection in sacral tissue   - Given ongoing fever with no clear source with obtain MRI Pelvis per ID recs to r/o sacral OM once renal fx improves    # Penile discharge   - GC/ chlamydia negative   - HIV negative   - Monitor for now    # PPX   - MRSA PCR positive and completed bactroban (2/26-3/3)     HEME   # Anemia likely second to ECMO circuit vs AOCD   - HH low in ICU second to ECMO circuit   - HH dropping again today however no bleeding noted   - Microcytic and hemochromic, sent anemia panel 3/19  - Trend HH     VASCULAR   # R IJ and BL LE DVTs   - Post ECMO dopplers on 3/9 negative for BL UE/ LE DVTs.   - Subsequent post ECMO dopplers performed on 3/14 and noted with acute, non-occlusive deep vein thrombosis noted within the right internal jugular vein. Acute, occlusive deep vein thromboses noted within the right and left peroneal veins at both mid calves, and age-indeterminate, occlusive deep vein thrombosis visualized within the left gastrocnemius vein at the proximal calf.     - Continue on argatroban GTT and changed to Eliquis   - last dose of Eliquis Saturday morning 4/12 in AM in preparation for PEG removal, plan to begin heparin drip today    PODIATRY   # BL plantar feet blisters likely pressors induced  - Seen by podiatry and blisters lanced on 3/4 and again on 3/18  - Continue on local wound care per podiatry   - Podiatry following  - OK for WBAT will fu with PT for offloading shoe if appropriate  - Pain mgt consult   - Podiatry FU 3/28 / 4/3 done /following     ENDOCRINE  # Pre-DM2   - A1C 6.7  - Continue on ISS   - Monitor FS   - IF no demand then stop ISS     LINES/ TUBES  - R IJ and R FEM ECMO (2/26-3/7)     SKIN  # Pannus canndial intertrigo   # Sacrum/ buttock MAD  - Continue on clotrimazole cream   - WOC appreciated   -Seen by WC pt noted to have oozing from Buttock wound surgicel placed by wound care On follow up no further bleeding  - Wound care placed orders   - No bleeding from wound     ETHICS/ GOC    - FULL CODE     DISPO - PT following  Seen by PMR for acute rehab per recs   38 YO M with PMHx of morbid obesity, BEA on CPAP, pAFIB (not on AC), HTN, and BL papilledema attributed to pseudotumor cerebri who presented initially to [facility] on 2/24 with SOB and BL LE edema. Found with URI outpatient with progressive SOB, and concern for noted for MFPNA on imaging. Course progressed with AHRF with worsening O2 demand, respiratory distress, secretions, and somnolence requiring intubation (difficult with success after 6 attempts) in ED and admission to [facility] MICU. While in MICU, patient noted with increasing O2 demand with no improvement despite optimal vent management. Concern noted for progressive ARDS, unable to prone given morbid obesity, and ultimately cannulated for vvECMO on 2/25 and transferred to [facility] for further management on 2/26. While at [facility], tx with diuresis and ABX, and ultimately decannulated and s/p 60XLTCP trach and PEG on 3/7. Patient ultimately transferred to [facility] on 3/11 and course complicated by recurrent high grade fevers and found with fungemia.    NEUROLOGY  # Hx of Pseudotumor Cerebri   - s/p LP in 2024 with high opening pressure  - s/p MRI 2024 with possible pituitary mass but unclear because of pressure effect from pseudotumor cerebri causing partially empty sella.  - Prolactin elevated   - ACTH low but received steroids during admission   - FT4 low normal and TSH normal, but given FT4 low normal TSH should be higher   - Discuss endocrine consult for inhouse work up vs outpatient.     # Sedation   - Weaned off sedation in ICU   - Nimbex turned off 2/27   - Prop turned off 3/9   - Precedex turned off and catapres started 3/11  - catapres 0.2 TID (decreased 3/25) - tapered down 3/31 to 0.1 tid - discontinued 4/3     # Insomnia   - Patient worked night shift x 15 years   - Trouble sleeping at night leading to day time sleepiness and poor participation with PT  - Continue on Seroquel 25 QHS (increased 3/18)   - Continue on melatonin   - Continue on Neurontin as below    # BL LE neuropathy   - CIPN concern   - Neurontin increased to 300mg Q8H with relief   - added Percocet Q4H for moderate pain (3/25) and continue on Dilaudid Q6H for severe pain   - Pain mgt called to consult - recs for tylenol ATC, NEurontin increased to 400mg q6, Oxycodone 5 q4h prn , Dilaudid for BREAKTHROUGH pain only, Lidocaine patches on each leg, ice prn to feet  - Some improvement in pain - PT consult for TENS,   - oxycodone inc to 10mg as pt with no relief /buttocks area indurated /tender CT ordered -no abscess  - C/w Gabapentin : 600/600/800 and up titrate as required  - Switched to PO Dilaudid 2mg Q6H PRN moderate-severe and IV Dilaudid PRN severe breakthrough and with dressing changes   - Still with severe pain 10/10 despite change to Dilaudid will increase to 4mg q4h prn with iv Dilaudid breakthrough  - Neurontin increased to 800mg q8h Monitor neuro status/ lethargy   - I have reviewed with pt to not wait to long for dose of pain medication to  prevent pain from escalating   - Meds reviewed despite being in constant pain pt not taking Dilaudid as often as he can often with 10/10 pain Dilaudid will be every 4 hours with parameters and pt can refuse Will reconsult pain mgt again for any recommendations on Monday   - Pain management reconsulted , recs to stop Dilaudid PO ATC , continue Dilaudid for breakthrough pain with dressing changes, increase gabapentin to 900 mg Q8, add Cymbalta 30 mg QD and Flexeril 5mg Q8  - Neurontin inc to 900 with some decrease in pain but still severe   - Will do trial of capsaicin bid with strict instructions to avoid open /broken irritated skin/ and bandaged areas     CARDIOVASCULAR  # HTN   - HTN noted in ICU requiring cardene and esmolol GTTs   - Lisinopril dc'ed to increase catapres   - Continue on norvasc 10, catapres weaned off 4/3  - Monitor BP     # AFIB   - Hx of pAFIB   - No RVR episodes or pAFIB episodes in ICU   - No rate control medications needed  - Monitor on telemetry     # RV dilation second to PHTN   - POCUS on arrival to [facility] with RVE concern   - TTE 10/2024 with EF 55-60 with normal LVSF, moderate LVH and normal RVSF and size with no concern for pHTN   - TTE on 2/25 at  with EF 61 and normal LVSF, but enlarged RV size with reduced RVSF, mild TR, mild PHTN with PASP 49, and dilated IVC to 3.4cn, but normal TAPSE 2.1.  - Continue on aggressive diuresis in ICU    - TTE 3/11 with RVSF reduced with TAPSE 1.6. IVC improved to 2.12cm.   - Lasix 40 PO QD discontinued 4/7 secondary to ELLA  - Monitor IO/ BMP    RESPIRATORY  # ARDS second to MFPNA   - Hx of BEA on CPAP presented to  post URI with SOB and found with AHRF with progression to ARDS second to post viral MFPNA   - Intubated 2/25   - Cannulated for vvECMO 2/26   - s/p 60XLTCP tracheostomy 3/7   - Decannulated 3/7   - CTSx removed tracheostomy and ECMO sutures on 3/17   - Continue on TC QD with PMV as able with strong phonation  - Continue on nebs and HTS   -  trials continue and now trialing overnight with BIPAP for OHS (10/8/12)  - Continue on PS 18/10/40 QHS given OHS   - Decannulated 3/28   - c/w nasal cannula 2L     GI  # Dysphagia   - s/p PEG 3/7   - Attempted green dye on 3/13 and failed  - Continue on TF  - Continue on miralax and senna  - RPT green dye passed 3/19  - 3/20 Passed FEEST - on soft bite sized with thin liquids Added consistent carb 2/2 weight/Elevated A1c   - plan for OR 4/15 for PEG removal with cardiothoracic surgery   - Hold Eliquis held for 72 hours last dose 4/12 AM - will begin Heparin drip to start today for bridge to procedure     # Mild transaminitis   - LFTs elevated in ICU with TBILI elevated likely from ECMO hemolysis   - US ABD with hepatic steatosis   - Trend LFTs      RENAL  # ELLA   - ELLA likely from cardiorenal with RVE   - Diuresed in ICU and improved   - Monitor renal function and UOP   - Recurrent ELLA noted suspected in s/o vancomycin toxicity (4/7)   - UA negative and Urine Studies FeNa 0.9 % suggest pre-renal etiology  - Lasix discontinued for now  - s/p IV fluid trial on 4/8 with minimal improvement  - Trialing additional fluids on 4/10   - Trend BMP and I/Os    # Hypernatremia (resolved)  - Hypernatremia with fever spikes   - Fever curve improved and attempting to wean FWF   - now off FWF    INFECTIOUS DISEASE  # Recurrent fevers with fungemia from unknown source   - Recurrent fevers post ECMO decannulation thought initially to be cytokine surge   - BCx, SCx, UCx, RVP and MRSA RPT on 3/12 negative   - Completed zosyn (3/12-3/18) empirically with improved WBC , however recurrent fevers noted.   - RPT SCx, UA, CXR and RVP negative  - TTE 3/17 with no IE  - PanCT 3/17 with PNA and persistent panniculitis  - BCx 3/15 and 3/16 with candida albicans, rpt BCx 3/18 and 3/20 negative   - Concern for possible source from panniculitis   - Continue on Caspofungin 70 mg daily 3/17 - 4/15, ID consult appreciated   - 3/30 spiked temp cx's sent + ua and c/s pending Bcx neg to date - started on vanco /zosyn ID following  - Vanco stopped (4/7) due to ELLA concern for Vanco toxicty and continued on empiric Zosyn/ Caspofungin   - 4/3 - persistent fevers, leukocytosis. Will repeat CT C/A/P to evaluate for source - no source found - Bilat lung opacities mildly decreased     - 4/8 bone scan: : "1. Large area of soft tissue infection in the R side of the pelvis as detailed above. Slightly more intense activity in the R iliac bone compared to the L raises possibility of OM. 2. Intense activity in the lateral aspect of the R forefoot compatible with soft tissue infection; complicating osteomyelitis is not excluded. Milder activity in the medial aspect of the L forefoot may reflect soft tissue infection."  - ID consult appreciated. Continue on Zosyn 3.375 g 8, plan on 6 week course for possible OM until 5/11 and Caspofungin 70 mg daily, will plan on 4 week course ending 4/15  - MR Pelvis Bony (4/11): No osteomyelitis. Sacral decubitus wound is again seen extending superiorly along the superficial margin of the right gluteus zurdo to the level of L5 with small amount of fluid and gas within the tract. The underlying gluteus zurdo demonstrates a region of decreased enhancement consistent with ischemia or necrosis measuring up to 11.9 cm transversely. Muscle edema suggests a nonspecific myositis. Small bilateral hip joint effusions with mild synovitis is nonspecific.    # MFPNA and abdominal pannus cellulitis   - Presented to  post URI with SOB and found with AHRF with progression to ARDS second to post viral MFPNA   - RVP, legionella, strept, BAL, BCx, UCx, HIV, PCP, and adenovirus PCR negative   - Initially started on multiple agents in ICU and ultimately completed zosyn (2/26-3/9) ZMAX (2/25-2/26) and vanco (2/26-3/1)     # R/o Sacral OM  - Noted with hard indurated sacral wound by Wound Care Team  - CT Pelvis (3/29) revealing superficial skin thickening overlying the sacrum with underlying edema of the subcutaneous fat, in keeping with the clinical history of sacral wound. No evidence of underlying tract or drainable fluid collection.  - CT 4/3 - no fluid collection in sacral tissue   - Given ongoing fever with no clear source with obtain MRI Pelvis per ID recs to r/o sacral OM once renal fx improves    # Penile discharge   - GC/ chlamydia negative   - HIV negative   - Monitor for now    # PPX   - MRSA PCR positive and completed bactroban (2/26-3/3)     HEME   # Anemia likely second to ECMO circuit vs AOCD   - HH low in ICU second to ECMO circuit   - HH dropping again today however no bleeding noted   - Microcytic and hemochromic, sent anemia panel 3/19  - Trend HH     VASCULAR   # R IJ and BL LE DVTs   - Post ECMO dopplers on 3/9 negative for BL UE/ LE DVTs.   - Subsequent post ECMO dopplers performed on 3/14 and noted with acute, non-occlusive deep vein thrombosis noted within the right internal jugular vein. Acute, occlusive deep vein thromboses noted within the right and left peroneal veins at both mid calves, and age-indeterminate, occlusive deep vein thrombosis visualized within the left gastrocnemius vein at the proximal calf.     - Continue on argatroban GTT and changed to Eliquis   - last dose of Eliquis Saturday morning 4/12 in AM in preparation for PEG removal, plan to begin heparin drip today    PODIATRY   # BL plantar feet blisters likely pressors induced  - Seen by podiatry and blisters lanced on 3/4 and again on 3/18  - Continue on local wound care per podiatry   - Podiatry following  - OK for WBAT will fu with PT for offloading shoe if appropriate  - Pain mgt consult   - Podiatry FU 3/28 / 4/3 done /following     ENDOCRINE  # Pre-DM2   - A1C 6.7  - Continue on ISS   - Monitor FS   - IF no demand then stop ISS     LINES/ TUBES  - R IJ and R FEM ECMO (2/26-3/7)     SKIN  # Pannus canndial intertrigo   # Sacrum/ buttock MAD  - Continue on clotrimazole cream   - WOC appreciated   -Seen by WC pt noted to have oozing from Buttock wound surgicel placed by wound care On follow up no further bleeding  - Wound care placed orders   - No bleeding from wound     ETHICS/ GOC    - FULL CODE     DISPO - PT following  Seen by PMR for acute rehab per recs

## 2025-04-13 NOTE — PROGRESS NOTE ADULT - SUBJECTIVE AND OBJECTIVE BOX
CHIEF COMPLAINT: Patient is a 39y old  Male who presents with a chief complaint of sob (02 Mar 2025 06:19)      INTERVAL EVENTS: No overnight events Follow PTT     ROS: Seen by bedside during AM rounds     OBJECTIVE:  ICU Vital Signs Last 24 Hrs  T(C): 36.2 (13 Apr 2025 00:00), Max: 36.4 (12 Apr 2025 12:00)  T(F): 97.2 (13 Apr 2025 00:00), Max: 97.5 (12 Apr 2025 12:00)  HR: 111 (13 Apr 2025 02:30) (102 - 113)  BP: 145/96 (13 Apr 2025 00:00) (135/97 - 148/96)  BP(mean): --  ABP: --  ABP(mean): --  RR: 18 (13 Apr 2025 00:00) (16 - 21)  SpO2: 100% (13 Apr 2025 02:30) (95% - 100%)    O2 Parameters below as of 13 Apr 2025 00:00  Patient On (Oxygen Delivery Method): nasal cannula w/ humidification  O2 Flow (L/min): 2            04-12 @ 07:01  -  04-13 @ 07:00  --------------------------------------------------------  IN: 0 mL / OUT: 4100 mL / NET: -4100 mL      CAPILLARY BLOOD GLUCOSE      POCT Blood Glucose.: 99 mg/dL (12 Apr 2025 22:07)      PHYSICAL EXAMINATION  General: Alert and cooperative. Working with physical therapy, currently on edge of bed. No apparent distress noted. + Family at bedside.  HEENT: Normocephalic/atraumatic.   Cardiovascular: Regular rhythm. + Sinus tachycardia    Respiratory: + NC. Breath sounds clear w/ diminished bases bilaterally without wheezing. No accessory muscles used.   Abdomen: + Central obesity. + PEG c/d/i. Soft, nontender, nondistended.   Skin: Warm to touch.   Extremities: + Full range of motion on upper extremities. Unable to move lower extremities. B/L lower extremities wrapped with gauze  No clubbing, cyanosis, edema, or varicose veins.   Neuro: AxO x 3. Follow commands.       HOSPITAL MEDICATIONS:  MEDICATIONS  (STANDING):  amLODIPine   Tablet 10 milliGRAM(s) Oral daily  caspofungin IVPB 70 milliGRAM(s) IV Intermittent every 24 hours  chlorhexidine 2% Cloths 1 Application(s) Topical <User Schedule>  clotrimazole 1% Cream 1 Application(s) Topical two times a day  Dakins Solution - 1/4 Strength 1 Application(s) Topical two times a day  dextrose 50% Injectable 25 Gram(s) IV Push once  dextrose 50% Injectable 12.5 Gram(s) IV Push once  dextrose 50% Injectable 25 Gram(s) IV Push once  DULoxetine 30 milliGRAM(s) Oral daily  gabapentin 800 milliGRAM(s) Oral every 8 hours  glucagon  Injectable 1 milliGRAM(s) IntraMuscular once  heparin  Infusion.  Unit(s)/Hr (24 mL/Hr) IV Continuous <Continuous>  insulin lispro (ADMELOG) corrective regimen sliding scale   SubCutaneous three times a day before meals  insulin lispro (ADMELOG) corrective regimen sliding scale   SubCutaneous at bedtime  lactated ringers. 1000 milliLiter(s) (100 mL/Hr) IV Continuous <Continuous>  lidocaine   4% Patch 1 Patch Transdermal daily  lidocaine   4% Patch 1 Patch Transdermal daily  melatonin 3 milliGRAM(s) Oral at bedtime  multivitamin 1 Tablet(s) Oral daily  mupirocin 2% Ointment 1 Application(s) Topical two times a day  pantoprazole    Tablet 40 milliGRAM(s) Oral before breakfast  piperacillin/tazobactam IVPB.. 3.375 Gram(s) IV Intermittent every 8 hours  polyethylene glycol 3350 17 Gram(s) Oral daily  QUEtiapine 25 milliGRAM(s) Oral at bedtime  senna 2 Tablet(s) Oral at bedtime  sevelamer carbonate 800 milliGRAM(s) Oral three times a day with meals    MEDICATIONS  (PRN):  acetaminophen     Tablet .. 650 milliGRAM(s) Oral every 6 hours PRN Temp greater or equal to 38C (100.4F), Mild Pain (1 - 3), Moderate Pain (4 - 6)  albuterol/ipratropium for Nebulization 3 milliLiter(s) Nebulizer every 6 hours PRN Shortness of Breath and/or Wheezing  cyclobenzaprine 5 milliGRAM(s) Oral three times a day PRN Muscle Spasm  dextrose Oral Gel 15 Gram(s) Oral once PRN Blood Glucose LESS THAN 70 milliGRAM(s)/deciliter  heparin   Injectable 52934 Unit(s) IV Push every 6 hours PRN For aPTT less than 40  heparin   Injectable 5000 Unit(s) IV Push every 6 hours PRN For aPTT between 40 - 57  HYDROmorphone  Injectable 0.5 milliGRAM(s) IV Push every 6 hours PRN dressing changes and severe breakthrough      LABS:                        7.8    10.41 )-----------( 500      ( 13 Apr 2025 00:50 )             27.3     04-12    139  |  97[L]  |  13  ----------------------------<  96  3.7   |  31  |  1.48[H]    Ca    9.2      12 Apr 2025 06:20  Phos  5.5     04-12  Mg     1.80     04-12      PTT - ( 13 Apr 2025 00:50 )  PTT:38.5 sec  Urinalysis Basic - ( 12 Apr 2025 06:20 )    Color: x / Appearance: x / SG: x / pH: x  Gluc: 96 mg/dL / Ketone: x  / Bili: x / Urobili: x   Blood: x / Protein: x / Nitrite: x   Leuk Esterase: x / RBC: x / WBC x   Sq Epi: x / Non Sq Epi: x / Bacteria: x         CHIEF COMPLAINT: Patient is a 39y old  Male who presents with a chief complaint of sob (02 Mar 2025 06:19)      INTERVAL EVENTS: No overnight events Follow PTT     ROS: Seen by bedside during rounds   Still with severe neuopathic "burning pain in feet"    OBJECTIVE:  ICU Vital Signs Last 24 Hrs  T(C): 36.2 (13 Apr 2025 00:00), Max: 36.4 (12 Apr 2025 12:00)  T(F): 97.2 (13 Apr 2025 00:00), Max: 97.5 (12 Apr 2025 12:00)  HR: 111 (13 Apr 2025 02:30) (102 - 113)  BP: 145/96 (13 Apr 2025 00:00) (135/97 - 148/96)  BP(mean): --  ABP: --  ABP(mean): --  RR: 18 (13 Apr 2025 00:00) (16 - 21)  SpO2: 100% (13 Apr 2025 02:30) (95% - 100%)    O2 Parameters below as of 13 Apr 2025 00:00  Patient On (Oxygen Delivery Method): nasal cannula w/ humidification  O2 Flow (L/min): 2            04-12 @ 07:01  -  04-13 @ 07:00  --------------------------------------------------------  IN: 0 mL / OUT: 4100 mL / NET: -4100 mL      CAPILLARY BLOOD GLUCOSE      POCT Blood Glucose.: 99 mg/dL (12 Apr 2025 22:07)      PHYSICAL EXAMINATION  General: Alert and cooperative. Working with physical therapy, currently on edge of bed. No apparent distress noted. + Family at bedside.  HEENT: Normocephalic/atraumatic.   Cardiovascular: Regular rhythm. + Sinus tachycardia    Respiratory: + NC. Breath sounds clear w/ diminished bases bilaterally without wheezing. No accessory muscles used.   Abdomen: + Central obesity. + PEG c/d/i. Soft, nontender, nondistended.   Skin: Warm to touch.   Extremities: + Full range of motion on upper extremities. Unable to move lower extremities. B/L lower extremities wrapped with gauze  No clubbing, cyanosis, edema, or varicose veins.   Neuro: AxO x 3. Follow commands.       HOSPITAL MEDICATIONS:  MEDICATIONS  (STANDING):  amLODIPine   Tablet 10 milliGRAM(s) Oral daily  caspofungin IVPB 70 milliGRAM(s) IV Intermittent every 24 hours  chlorhexidine 2% Cloths 1 Application(s) Topical <User Schedule>  clotrimazole 1% Cream 1 Application(s) Topical two times a day  Dakins Solution - 1/4 Strength 1 Application(s) Topical two times a day  dextrose 50% Injectable 25 Gram(s) IV Push once  dextrose 50% Injectable 12.5 Gram(s) IV Push once  dextrose 50% Injectable 25 Gram(s) IV Push once  DULoxetine 30 milliGRAM(s) Oral daily  gabapentin 800 milliGRAM(s) Oral every 8 hours  glucagon  Injectable 1 milliGRAM(s) IntraMuscular once  heparin  Infusion.  Unit(s)/Hr (24 mL/Hr) IV Continuous <Continuous>  insulin lispro (ADMELOG) corrective regimen sliding scale   SubCutaneous three times a day before meals  insulin lispro (ADMELOG) corrective regimen sliding scale   SubCutaneous at bedtime  lactated ringers. 1000 milliLiter(s) (100 mL/Hr) IV Continuous <Continuous>  lidocaine   4% Patch 1 Patch Transdermal daily  lidocaine   4% Patch 1 Patch Transdermal daily  melatonin 3 milliGRAM(s) Oral at bedtime  multivitamin 1 Tablet(s) Oral daily  mupirocin 2% Ointment 1 Application(s) Topical two times a day  pantoprazole    Tablet 40 milliGRAM(s) Oral before breakfast  piperacillin/tazobactam IVPB.. 3.375 Gram(s) IV Intermittent every 8 hours  polyethylene glycol 3350 17 Gram(s) Oral daily  QUEtiapine 25 milliGRAM(s) Oral at bedtime  senna 2 Tablet(s) Oral at bedtime  sevelamer carbonate 800 milliGRAM(s) Oral three times a day with meals    MEDICATIONS  (PRN):  acetaminophen     Tablet .. 650 milliGRAM(s) Oral every 6 hours PRN Temp greater or equal to 38C (100.4F), Mild Pain (1 - 3), Moderate Pain (4 - 6)  albuterol/ipratropium for Nebulization 3 milliLiter(s) Nebulizer every 6 hours PRN Shortness of Breath and/or Wheezing  cyclobenzaprine 5 milliGRAM(s) Oral three times a day PRN Muscle Spasm  dextrose Oral Gel 15 Gram(s) Oral once PRN Blood Glucose LESS THAN 70 milliGRAM(s)/deciliter  heparin   Injectable 52476 Unit(s) IV Push every 6 hours PRN For aPTT less than 40  heparin   Injectable 5000 Unit(s) IV Push every 6 hours PRN For aPTT between 40 - 57  HYDROmorphone  Injectable 0.5 milliGRAM(s) IV Push every 6 hours PRN dressing changes and severe breakthrough      LABS:                        7.8    10.41 )-----------( 500      ( 13 Apr 2025 00:50 )             27.3     04-12    139  |  97[L]  |  13  ----------------------------<  96  3.7   |  31  |  1.48[H]    Ca    9.2      12 Apr 2025 06:20  Phos  5.5     04-12  Mg     1.80     04-12      PTT - ( 13 Apr 2025 00:50 )  PTT:38.5 sec  Urinalysis Basic - ( 12 Apr 2025 06:20 )    Color: x / Appearance: x / SG: x / pH: x  Gluc: 96 mg/dL / Ketone: x  / Bili: x / Urobili: x   Blood: x / Protein: x / Nitrite: x   Leuk Esterase: x / RBC: x / WBC x   Sq Epi: x / Non Sq Epi: x / Bacteria: x

## 2025-04-14 LAB
ANION GAP SERPL CALC-SCNC: 11 MMOL/L — SIGNIFICANT CHANGE UP (ref 7–14)
APTT BLD: 55.9 SEC — HIGH (ref 24.5–35.6)
APTT BLD: 59 SEC — HIGH (ref 24.5–35.6)
APTT BLD: 72.2 SEC — HIGH (ref 24.5–35.6)
BUN SERPL-MCNC: 11 MG/DL — SIGNIFICANT CHANGE UP (ref 7–23)
CALCIUM SERPL-MCNC: 9.5 MG/DL — SIGNIFICANT CHANGE UP (ref 8.4–10.5)
CHLORIDE SERPL-SCNC: 98 MMOL/L — SIGNIFICANT CHANGE UP (ref 98–107)
CO2 SERPL-SCNC: 32 MMOL/L — HIGH (ref 22–31)
CREAT SERPL-MCNC: 1.49 MG/DL — HIGH (ref 0.5–1.3)
EGFR: 62 ML/MIN/1.73M2 — SIGNIFICANT CHANGE UP
EGFR: 62 ML/MIN/1.73M2 — SIGNIFICANT CHANGE UP
GLUCOSE BLDC GLUCOMTR-MCNC: 108 MG/DL — HIGH (ref 70–99)
GLUCOSE BLDC GLUCOMTR-MCNC: 91 MG/DL — SIGNIFICANT CHANGE UP (ref 70–99)
GLUCOSE BLDC GLUCOMTR-MCNC: 95 MG/DL — SIGNIFICANT CHANGE UP (ref 70–99)
GLUCOSE BLDC GLUCOMTR-MCNC: 96 MG/DL — SIGNIFICANT CHANGE UP (ref 70–99)
GLUCOSE SERPL-MCNC: 93 MG/DL — SIGNIFICANT CHANGE UP (ref 70–99)
HCT VFR BLD CALC: 23.4 % — LOW (ref 39–50)
HGB BLD-MCNC: 8 G/DL — LOW (ref 13–17)
INR BLD: 1.34 RATIO — HIGH (ref 0.85–1.16)
MAGNESIUM SERPL-MCNC: 1.9 MG/DL — SIGNIFICANT CHANGE UP (ref 1.6–2.6)
MCHC RBC-ENTMCNC: 30.5 PG — SIGNIFICANT CHANGE UP (ref 27–34)
MCHC RBC-ENTMCNC: 34.2 G/DL — SIGNIFICANT CHANGE UP (ref 32–36)
MCV RBC AUTO: 89.3 FL — SIGNIFICANT CHANGE UP (ref 80–100)
NRBC # BLD AUTO: 0 K/UL — SIGNIFICANT CHANGE UP (ref 0–0)
NRBC # FLD: 0 K/UL — SIGNIFICANT CHANGE UP (ref 0–0)
NRBC BLD AUTO-RTO: 0 /100 WBCS — SIGNIFICANT CHANGE UP (ref 0–0)
PHOSPHATE SERPL-MCNC: 5.1 MG/DL — HIGH (ref 2.5–4.5)
PLATELET # BLD AUTO: 491 K/UL — HIGH (ref 150–400)
POTASSIUM SERPL-MCNC: 3.5 MMOL/L — SIGNIFICANT CHANGE UP (ref 3.5–5.3)
POTASSIUM SERPL-SCNC: 3.5 MMOL/L — SIGNIFICANT CHANGE UP (ref 3.5–5.3)
PROTHROM AB SERPL-ACNC: 15.9 SEC — HIGH (ref 9.9–13.4)
RBC # BLD: 2.62 M/UL — LOW (ref 4.2–5.8)
RBC # FLD: 24.5 % — HIGH (ref 10.3–14.5)
SODIUM SERPL-SCNC: 141 MMOL/L — SIGNIFICANT CHANGE UP (ref 135–145)
WBC # BLD: 9.55 K/UL — SIGNIFICANT CHANGE UP (ref 3.8–10.5)
WBC # FLD AUTO: 9.55 K/UL — SIGNIFICANT CHANGE UP (ref 3.8–10.5)

## 2025-04-14 PROCEDURE — 99253 IP/OBS CNSLTJ NEW/EST LOW 45: CPT | Mod: 25,57,GC

## 2025-04-14 PROCEDURE — 99232 SBSQ HOSP IP/OBS MODERATE 35: CPT

## 2025-04-14 PROCEDURE — G0545: CPT

## 2025-04-14 PROCEDURE — 99233 SBSQ HOSP IP/OBS HIGH 50: CPT

## 2025-04-14 RX ORDER — HEPARIN SODIUM 1000 [USP'U]/ML
5000 INJECTION INTRAVENOUS; SUBCUTANEOUS EVERY 6 HOURS
Refills: 0 | Status: DISCONTINUED | OUTPATIENT
Start: 2025-04-14 | End: 2025-04-15

## 2025-04-14 RX ORDER — HEPARIN SODIUM 1000 [USP'U]/ML
10000 INJECTION INTRAVENOUS; SUBCUTANEOUS EVERY 6 HOURS
Refills: 0 | Status: DISCONTINUED | OUTPATIENT
Start: 2025-04-14 | End: 2025-04-15

## 2025-04-14 RX ORDER — HEPARIN SODIUM 1000 [USP'U]/ML
3500 INJECTION INTRAVENOUS; SUBCUTANEOUS
Qty: 25000 | Refills: 0 | Status: DISCONTINUED | OUTPATIENT
Start: 2025-04-14 | End: 2025-04-15

## 2025-04-14 RX ADMIN — SEVELAMER HYDROCHLORIDE 800 MILLIGRAM(S): 800 TABLET ORAL at 17:28

## 2025-04-14 RX ADMIN — Medication 1 APPLICATION(S): at 05:28

## 2025-04-14 RX ADMIN — Medication 1 TABLET(S): at 11:50

## 2025-04-14 RX ADMIN — CASPOFUNGIN ACETATE 260 MILLIGRAM(S): 5 INJECTION, POWDER, LYOPHILIZED, FOR SOLUTION INTRAVENOUS at 05:47

## 2025-04-14 RX ADMIN — SODIUM HYPOCHLORITE 1 APPLICATION(S): 0.12 SOLUTION TOPICAL at 17:30

## 2025-04-14 RX ADMIN — GABAPENTIN 900 MILLIGRAM(S): 400 CAPSULE ORAL at 21:35

## 2025-04-14 RX ADMIN — AMLODIPINE BESYLATE 10 MILLIGRAM(S): 10 TABLET ORAL at 05:33

## 2025-04-14 RX ADMIN — MUPIROCIN CALCIUM 1 APPLICATION(S): 20 CREAM TOPICAL at 05:30

## 2025-04-14 RX ADMIN — Medication 25 GRAM(S): at 13:16

## 2025-04-14 RX ADMIN — Medication 1 APPLICATION(S): at 17:29

## 2025-04-14 RX ADMIN — HEPARIN SODIUM 3500 UNIT(S)/HR: 1000 INJECTION INTRAVENOUS; SUBCUTANEOUS at 02:05

## 2025-04-14 RX ADMIN — Medication 0.5 MILLIGRAM(S): at 06:10

## 2025-04-14 RX ADMIN — Medication 25 GRAM(S): at 05:58

## 2025-04-14 RX ADMIN — HEPARIN SODIUM 3500 UNIT(S)/HR: 1000 INJECTION INTRAVENOUS; SUBCUTANEOUS at 22:48

## 2025-04-14 RX ADMIN — HEPARIN SODIUM 3200 UNIT(S)/HR: 1000 INJECTION INTRAVENOUS; SUBCUTANEOUS at 00:12

## 2025-04-14 RX ADMIN — SODIUM HYPOCHLORITE 1 APPLICATION(S): 0.12 SOLUTION TOPICAL at 05:28

## 2025-04-14 RX ADMIN — Medication 0.5 MILLIGRAM(S): at 18:43

## 2025-04-14 RX ADMIN — HEPARIN SODIUM 3500 UNIT(S)/HR: 1000 INJECTION INTRAVENOUS; SUBCUTANEOUS at 07:13

## 2025-04-14 RX ADMIN — Medication 0.5 MILLIGRAM(S): at 05:37

## 2025-04-14 RX ADMIN — Medication 40 MILLIGRAM(S): at 05:33

## 2025-04-14 RX ADMIN — GABAPENTIN 900 MILLIGRAM(S): 400 CAPSULE ORAL at 05:31

## 2025-04-14 RX ADMIN — Medication 3 MILLIGRAM(S): at 21:37

## 2025-04-14 RX ADMIN — SEVELAMER HYDROCHLORIDE 800 MILLIGRAM(S): 800 TABLET ORAL at 11:50

## 2025-04-14 RX ADMIN — CLOTRIMAZOLE 1 APPLICATION(S): 1 CREAM TOPICAL at 17:29

## 2025-04-14 RX ADMIN — HEPARIN SODIUM 3500 UNIT(S)/HR: 1000 INJECTION INTRAVENOUS; SUBCUTANEOUS at 19:19

## 2025-04-14 RX ADMIN — Medication 0.5 MILLIGRAM(S): at 12:18

## 2025-04-14 RX ADMIN — HEPARIN SODIUM 3500 UNIT(S)/HR: 1000 INJECTION INTRAVENOUS; SUBCUTANEOUS at 07:20

## 2025-04-14 RX ADMIN — SEVELAMER HYDROCHLORIDE 800 MILLIGRAM(S): 800 TABLET ORAL at 08:18

## 2025-04-14 RX ADMIN — Medication 1 APPLICATION(S): at 05:32

## 2025-04-14 RX ADMIN — Medication 25 GRAM(S): at 21:45

## 2025-04-14 RX ADMIN — GABAPENTIN 900 MILLIGRAM(S): 400 CAPSULE ORAL at 13:16

## 2025-04-14 RX ADMIN — DULOXETINE 30 MILLIGRAM(S): 20 CAPSULE, DELAYED RELEASE ORAL at 11:51

## 2025-04-14 RX ADMIN — QUETIAPINE FUMARATE 25 MILLIGRAM(S): 25 TABLET ORAL at 21:37

## 2025-04-14 RX ADMIN — HEPARIN SODIUM 3500 UNIT(S)/HR: 1000 INJECTION INTRAVENOUS; SUBCUTANEOUS at 07:17

## 2025-04-14 RX ADMIN — MUPIROCIN CALCIUM 1 APPLICATION(S): 20 CREAM TOPICAL at 17:28

## 2025-04-14 RX ADMIN — CLOTRIMAZOLE 1 APPLICATION(S): 1 CREAM TOPICAL at 05:29

## 2025-04-14 RX ADMIN — Medication 0.5 MILLIGRAM(S): at 11:48

## 2025-04-14 NOTE — PROGRESS NOTE ADULT - SUBJECTIVE AND OBJECTIVE BOX
Infectious Diseases Follow Up:    Patient is a 39y old  Male who presents with a chief complaint of sob (02 Mar 2025 06:19)      Interval History/ROS:  No acute events, pt still with severe b/l foot pain     Allergies  No Known Allergies        ANTIMICROBIALS:  caspofungin IVPB 70 every 24 hours  piperacillin/tazobactam IVPB.. 3.375 every 8 hours      Current Abx:     Previous Abx     OTHER MEDS:  MEDICATIONS  (STANDING):  acetaminophen     Tablet .. 650 every 6 hours PRN  albuterol/ipratropium for Nebulization 3 every 6 hours PRN  amLODIPine   Tablet 10 daily  cyclobenzaprine 5 three times a day PRN  dextrose 50% Injectable 25 once  dextrose 50% Injectable 12.5 once  dextrose 50% Injectable 25 once  dextrose Oral Gel 15 once PRN  DULoxetine 30 daily  gabapentin 900 every 8 hours  glucagon  Injectable 1 once  heparin   Injectable 59964 every 6 hours PRN  heparin   Injectable 5000 every 6 hours PRN  heparin  Infusion.  <Continuous>  HYDROmorphone  Injectable 0.5 every 6 hours PRN  insulin lispro (ADMELOG) corrective regimen sliding scale  three times a day before meals  insulin lispro (ADMELOG) corrective regimen sliding scale  at bedtime  melatonin 3 at bedtime  pantoprazole    Tablet 40 before breakfast  polyethylene glycol 3350 17 daily  QUEtiapine 25 at bedtime  senna 2 at bedtime      Vital Signs Last 24 Hrs  T(C): 36.8 (14 Apr 2025 05:30), Max: 36.9 (13 Apr 2025 16:58)  T(F): 98.2 (14 Apr 2025 05:30), Max: 98.4 (13 Apr 2025 16:58)  HR: 111 (14 Apr 2025 11:29) (104 - 111)  BP: 158/105 (14 Apr 2025 05:30) (141/89 - 158/105)  BP(mean): 121 (14 Apr 2025 05:30) (108 - 121)  RR: 21 (14 Apr 2025 05:30) (18 - 21)  SpO2: 100% (14 Apr 2025 11:29) (91% - 100%)    Parameters below as of 14 Apr 2025 05:30  Patient On (Oxygen Delivery Method): nasal cannula w/ humidification  O2 Flow (L/min): 2      PHYSICAL EXAM:  GENERAL: NAD  HEAD:  Atraumatic, Normocephalic  EYES: EOMI, conjunctiva and sclera clear  CHEST/LUNG: On NC, CTAB  HEART: RRR  ABDOMEN: Soft, Nontender, Nondistended  Extremities: B/l feet wrapped   PSYCH: AAOx3                            8.0    9.55  )-----------( 491      ( 14 Apr 2025 05:50 )             23.4       04-14    141  |  98  |  11  ----------------------------<  93  3.5   |  32[H]  |  1.49[H]    Ca    9.5      14 Apr 2025 05:50  Phos  5.1     04-14  Mg     1.90     04-14        Urinalysis Basic - ( 14 Apr 2025 05:50 )    Color: x / Appearance: x / SG: x / pH: x  Gluc: 93 mg/dL / Ketone: x  / Bili: x / Urobili: x   Blood: x / Protein: x / Nitrite: x   Leuk Esterase: x / RBC: x / WBC x   Sq Epi: x / Non Sq Epi: x / Bacteria: x        MICROBIOLOGY:  v  Blood Blood-Venous  04-07-25   No growth at 5 days  --  --      Blood Blood-Peripheral  04-07-25   No growth at 5 days  --  --      Clean Catch Clean Catch (Midstream)  03-31-25   <10,000 CFU/mL Normal Urogenital Kyle  --  --      Blood Blood-Peripheral  03-30-25   No growth at 5 days  --  --      Trach Asp Tracheal Aspirate  03-21-25   Commensal kyle consistent with body site  --    Few polymorphonuclear leukocytes per low power field  No Squamous epithelial cells per low power field  No organisms seen per oil power field      Blood Blood-Peripheral  03-20-25   No growth at 5 days  --  --      Blood Blood-Venous  03-20-25   No growth at 5 days  --  --      Blood Blood-Peripheral  03-18-25   No growth at 5 days  --  Blood Culture PCR  Candida albicans      Nares/Axilla/Groin Nares/Axilla/Groin  03-17-25   Culture NEGATIVE for Candida auris.  This surveillance culture is intended for Infection Control purposes only.  A negative result does not preclude the carriage of other fungal  organisms.  --  --      Blood Blood-Venous  03-16-25   Growth in aerobic bottle: Candida albicans  See previous culture [id number]  --    Growth in aerobic bottle: Yeast like cells      Blood Blood-Peripheral  03-16-25   Growth in aerobic bottle: Candida albicans  See previous culture [id number]  --    Growth in aerobic bottle: Yeast like cells      Trach Asp Tracheal Aspirate  03-16-25   Commensal kyle consistent with body site  --    Few polymorphonuclear leukocytes per low power field  Rare Squamous epithelial cells per low power field  Rare Gram Positive Rods seen per oil power field  Rare Gram Negative Rods seen per oil power field      Blood Blood-Venous  03-15-25   Growth in aerobic bottle: Candida albicans  See previous culture [id number]  --    Growth in aerobic bottle: Yeast like cells      Blood Blood-Peripheral  03-15-25   Growth in aerobic bottle: Candida albicans  Direct identification is available within approximately 3-5  hours either by Blood Panel Multiplexed PCR or Direct  MALDI-TOF. Details: [email or web address]  --  Blood Culture PCR  Candida albicans            RADIOLOGY:  < from: MR Pelvis Bony Only w/wo IV Cont (04.11.25 @ 15:53) >  IMPRESSION:  1.  No osteomyelitis.  2.  Sacral decubitus wound is again seen extending superiorly along the   superficial margin of the right gluteus zurdo to the level of L5 with   small amount of fluid and gas within the tract.  3.  The underlying gluteus zurdo demonstrates a region of decreased   enhancement consistent with ischemia or necrosis measuring up to 11.9 cm   transversely.  4.  Muscle edema suggests a nonspecific myositis.  5.  Small bilateral hip joint effusions with mild synovitis is   nonspecific.    < end of copied text >

## 2025-04-14 NOTE — PROGRESS NOTE ADULT - SUBJECTIVE AND OBJECTIVE BOX
CHIEF COMPLAINT: Patient is a 39y old  Male who presents with a chief complaint of sob    INTERVAL EVENTS: No acute events overnight.    REVIEW OF SYSTEMS: Seen and evaluated by bedside during AM rounds.            OBJECTIVE:  ICU Vital Signs Last 24 Hrs  T(C): 36.8 (14 Apr 2025 05:30), Max: 36.9 (13 Apr 2025 16:58)  T(F): 98.2 (14 Apr 2025 05:30), Max: 98.4 (13 Apr 2025 16:58)  HR: 107 (14 Apr 2025 05:30) (100 - 113)  BP: 158/105 (14 Apr 2025 05:30) (141/89 - 158/105)  BP(mean): 121 (14 Apr 2025 05:30) (108 - 121)  ABP: --  ABP(mean): --  RR: 21 (14 Apr 2025 05:30) (18 - 21)  SpO2: 95% (14 Apr 2025 05:30) (95% - 100%)    O2 Parameters below as of 14 Apr 2025 05:30  Patient On (Oxygen Delivery Method): nasal cannula w/ humidification  O2 Flow (L/min): 2            04-12 @ 07:01 - 04-13 @ 07:00  --------------------------------------------------------  IN: 0 mL / OUT: 4100 mL / NET: -4100 mL    04-13 @ 07:01 - 04-14 @ 06:46  --------------------------------------------------------  IN: 548 mL / OUT: 4800 mL / NET: -4252 mL      CAPILLARY BLOOD GLUCOSE      POCT Blood Glucose.: 98 mg/dL (13 Apr 2025 21:18)      HOSPITAL MEDICATIONS:  MEDICATIONS  (STANDING):  amLODIPine   Tablet 10 milliGRAM(s) Oral daily  capsaicin HP 0.075% Cream 1 Application(s) Topical two times a day  caspofungin IVPB 70 milliGRAM(s) IV Intermittent every 24 hours  chlorhexidine 2% Cloths 1 Application(s) Topical <User Schedule>  clotrimazole 1% Cream 1 Application(s) Topical two times a day  Dakins Solution - 1/4 Strength 1 Application(s) Topical two times a day  dextrose 50% Injectable 25 Gram(s) IV Push once  dextrose 50% Injectable 12.5 Gram(s) IV Push once  dextrose 50% Injectable 25 Gram(s) IV Push once  DULoxetine 30 milliGRAM(s) Oral daily  gabapentin 900 milliGRAM(s) Oral every 8 hours  glucagon  Injectable 1 milliGRAM(s) IntraMuscular once  heparin  Infusion.  Unit(s)/Hr (24 mL/Hr) IV Continuous <Continuous>  insulin lispro (ADMELOG) corrective regimen sliding scale   SubCutaneous three times a day before meals  insulin lispro (ADMELOG) corrective regimen sliding scale   SubCutaneous at bedtime  lidocaine   4% Patch 1 Patch Transdermal daily  lidocaine   4% Patch 1 Patch Transdermal daily  melatonin 3 milliGRAM(s) Oral at bedtime  multivitamin 1 Tablet(s) Oral daily  mupirocin 2% Ointment 1 Application(s) Topical two times a day  pantoprazole    Tablet 40 milliGRAM(s) Oral before breakfast  piperacillin/tazobactam IVPB.. 3.375 Gram(s) IV Intermittent every 8 hours  polyethylene glycol 3350 17 Gram(s) Oral daily  QUEtiapine 25 milliGRAM(s) Oral at bedtime  senna 2 Tablet(s) Oral at bedtime  sevelamer carbonate 800 milliGRAM(s) Oral three times a day with meals    MEDICATIONS  (PRN):  acetaminophen     Tablet .. 650 milliGRAM(s) Oral every 6 hours PRN Temp greater or equal to 38C (100.4F), Mild Pain (1 - 3), Moderate Pain (4 - 6)  albuterol/ipratropium for Nebulization 3 milliLiter(s) Nebulizer every 6 hours PRN Shortness of Breath and/or Wheezing  cyclobenzaprine 5 milliGRAM(s) Oral three times a day PRN Muscle Spasm  dextrose Oral Gel 15 Gram(s) Oral once PRN Blood Glucose LESS THAN 70 milliGRAM(s)/deciliter  heparin   Injectable 82082 Unit(s) IV Push every 6 hours PRN For aPTT less than 40  heparin   Injectable 5000 Unit(s) IV Push every 6 hours PRN For aPTT between 40 - 57  HYDROmorphone  Injectable 0.5 milliGRAM(s) IV Push every 6 hours PRN dressing changes and severe breakthrough      PHYSICAL EXAMINATION  General: Alert and cooperative. Working with physical therapy, currently on edge of bed. No apparent distress noted. + Family at bedside.  HEENT: Normocephalic/atraumatic.   Cardiovascular: Regular rhythm. + Sinus tachycardia    Respiratory: + NC. Breath sounds clear w/ diminished bases bilaterally without wheezing. No accessory muscles used.   Abdomen: + Central obesity. + PEG c/d/i. Soft, nontender, nondistended.   Skin: Warm to touch.   Extremities: + Full range of motion on upper extremities. Unable to move lower extremities. B/L lower extremities wrapped with gauze  No clubbing, cyanosis, edema, or varicose veins.   Neuro: AxO x 3. Follow commands.     LABS:                        7.8    10.41 )-----------( 500      ( 13 Apr 2025 00:50 )             27.3           PTT - ( 14 Apr 2025 00:28 )  PTT:55.9 sec          PAST MEDICAL & SURGICAL HISTORY:  HTN (hypertension)      Atrial fibrillation  paroxysmal      Papilledema of both eyes      Chronic sinusitis      Morbid obesity      No significant past surgical history          FAMILY HISTORY:      Social History:  sexually active with female partner occasionally (26 Feb 2025 01:48)      RADIOLOGY:  [ ] Reviewed and interpreted by me    PULMONARY FUNCTION TESTS:    EKG: CHIEF COMPLAINT: Patient is a 39y old  Male who presents with a chief complaint of sob    INTERVAL EVENTS: No acute events overnight.    REVIEW OF SYSTEMS: Seen and evaluated by bedside during AM rounds. Patient endorses experiencing constant b/l LE pain.           OBJECTIVE:  ICU Vital Signs Last 24 Hrs  T(C): 36.8 (14 Apr 2025 05:30), Max: 36.9 (13 Apr 2025 16:58)  T(F): 98.2 (14 Apr 2025 05:30), Max: 98.4 (13 Apr 2025 16:58)  HR: 107 (14 Apr 2025 05:30) (100 - 113)  BP: 158/105 (14 Apr 2025 05:30) (141/89 - 158/105)  BP(mean): 121 (14 Apr 2025 05:30) (108 - 121)  ABP: --  ABP(mean): --  RR: 21 (14 Apr 2025 05:30) (18 - 21)  SpO2: 95% (14 Apr 2025 05:30) (95% - 100%)    O2 Parameters below as of 14 Apr 2025 05:30  Patient On (Oxygen Delivery Method): nasal cannula w/ humidification  O2 Flow (L/min): 2            04-12 @ 07:01 - 04-13 @ 07:00  --------------------------------------------------------  IN: 0 mL / OUT: 4100 mL / NET: -4100 mL    04-13 @ 07:01 - 04-14 @ 06:46  --------------------------------------------------------  IN: 548 mL / OUT: 4800 mL / NET: -4252 mL      CAPILLARY BLOOD GLUCOSE      POCT Blood Glucose.: 98 mg/dL (13 Apr 2025 21:18)      HOSPITAL MEDICATIONS:  MEDICATIONS  (STANDING):  amLODIPine   Tablet 10 milliGRAM(s) Oral daily  capsaicin HP 0.075% Cream 1 Application(s) Topical two times a day  caspofungin IVPB 70 milliGRAM(s) IV Intermittent every 24 hours  chlorhexidine 2% Cloths 1 Application(s) Topical <User Schedule>  clotrimazole 1% Cream 1 Application(s) Topical two times a day  Dakins Solution - 1/4 Strength 1 Application(s) Topical two times a day  dextrose 50% Injectable 25 Gram(s) IV Push once  dextrose 50% Injectable 12.5 Gram(s) IV Push once  dextrose 50% Injectable 25 Gram(s) IV Push once  DULoxetine 30 milliGRAM(s) Oral daily  gabapentin 900 milliGRAM(s) Oral every 8 hours  glucagon  Injectable 1 milliGRAM(s) IntraMuscular once  heparin  Infusion.  Unit(s)/Hr (24 mL/Hr) IV Continuous <Continuous>  insulin lispro (ADMELOG) corrective regimen sliding scale   SubCutaneous three times a day before meals  insulin lispro (ADMELOG) corrective regimen sliding scale   SubCutaneous at bedtime  lidocaine   4% Patch 1 Patch Transdermal daily  lidocaine   4% Patch 1 Patch Transdermal daily  melatonin 3 milliGRAM(s) Oral at bedtime  multivitamin 1 Tablet(s) Oral daily  mupirocin 2% Ointment 1 Application(s) Topical two times a day  pantoprazole    Tablet 40 milliGRAM(s) Oral before breakfast  piperacillin/tazobactam IVPB.. 3.375 Gram(s) IV Intermittent every 8 hours  polyethylene glycol 3350 17 Gram(s) Oral daily  QUEtiapine 25 milliGRAM(s) Oral at bedtime  senna 2 Tablet(s) Oral at bedtime  sevelamer carbonate 800 milliGRAM(s) Oral three times a day with meals    MEDICATIONS  (PRN):  acetaminophen     Tablet .. 650 milliGRAM(s) Oral every 6 hours PRN Temp greater or equal to 38C (100.4F), Mild Pain (1 - 3), Moderate Pain (4 - 6)  albuterol/ipratropium for Nebulization 3 milliLiter(s) Nebulizer every 6 hours PRN Shortness of Breath and/or Wheezing  cyclobenzaprine 5 milliGRAM(s) Oral three times a day PRN Muscle Spasm  dextrose Oral Gel 15 Gram(s) Oral once PRN Blood Glucose LESS THAN 70 milliGRAM(s)/deciliter  heparin   Injectable 20076 Unit(s) IV Push every 6 hours PRN For aPTT less than 40  heparin   Injectable 5000 Unit(s) IV Push every 6 hours PRN For aPTT between 40 - 57  HYDROmorphone  Injectable 0.5 milliGRAM(s) IV Push every 6 hours PRN dressing changes and severe breakthrough      PHYSICAL EXAMINATION  General: Alert and cooperative. Sitting in recliner, resting comfortably. No apparent distress noted.   HEENT: Normocephalic/atraumatic.   Cardiovascular: Regular rhythm. + Sinus tachycardia    Respiratory: + NC. Breath sounds clear w/ diminished bases bilaterally without wheezing. No accessory muscles used.   Abdomen: + Central obesity. + PEG c/d/i. Soft, nontender, nondistended.   Skin: Warm to touch.   Extremities: + Full range of motion on upper extremities. Unable to move lower extremities. B/L lower extremities wrapped with gauze  No clubbing, cyanosis, edema, or varicose veins.   Neuro: AxO x 3. Follow commands.     LABS:                        7.8    10.41 )-----------( 500      ( 13 Apr 2025 00:50 )             27.3           PTT - ( 14 Apr 2025 00:28 )  PTT:55.9 sec          PAST MEDICAL & SURGICAL HISTORY:  HTN (hypertension)      Atrial fibrillation  paroxysmal      Papilledema of both eyes      Chronic sinusitis      Morbid obesity      No significant past surgical history          FAMILY HISTORY:      Social History:  sexually active with female partner occasionally (26 Feb 2025 01:48)      RADIOLOGY:  [ ] Reviewed and interpreted by me    PULMONARY FUNCTION TESTS:    EKG: CHIEF COMPLAINT: Patient is a 39y old  Male who presents with a chief complaint of sob    INTERVAL EVENTS: No acute events overnight.    REVIEW OF SYSTEMS: Seen and evaluated by bedside during AM rounds. Patient endorses experiencing constant b/l LE pain.           OBJECTIVE:  ICU Vital Signs Last 24 Hrs  T(C): 36.8 (14 Apr 2025 05:30), Max: 36.9 (13 Apr 2025 16:58)  T(F): 98.2 (14 Apr 2025 05:30), Max: 98.4 (13 Apr 2025 16:58)  HR: 107 (14 Apr 2025 05:30) (100 - 113)  BP: 158/105 (14 Apr 2025 05:30) (141/89 - 158/105)  BP(mean): 121 (14 Apr 2025 05:30) (108 - 121)  ABP: --  ABP(mean): --  RR: 21 (14 Apr 2025 05:30) (18 - 21)  SpO2: 95% (14 Apr 2025 05:30) (95% - 100%)    O2 Parameters below as of 14 Apr 2025 05:30  Patient On (Oxygen Delivery Method): nasal cannula w/ humidification  O2 Flow (L/min): 2            04-12 @ 07:01 - 04-13 @ 07:00  --------------------------------------------------------  IN: 0 mL / OUT: 4100 mL / NET: -4100 mL    04-13 @ 07:01 - 04-14 @ 06:46  --------------------------------------------------------  IN: 548 mL / OUT: 4800 mL / NET: -4252 mL      CAPILLARY BLOOD GLUCOSE      POCT Blood Glucose.: 98 mg/dL (13 Apr 2025 21:18)      HOSPITAL MEDICATIONS:  MEDICATIONS  (STANDING):  amLODIPine   Tablet 10 milliGRAM(s) Oral daily  capsaicin HP 0.075% Cream 1 Application(s) Topical two times a day  caspofungin IVPB 70 milliGRAM(s) IV Intermittent every 24 hours  chlorhexidine 2% Cloths 1 Application(s) Topical <User Schedule>  clotrimazole 1% Cream 1 Application(s) Topical two times a day  Dakins Solution - 1/4 Strength 1 Application(s) Topical two times a day  dextrose 50% Injectable 25 Gram(s) IV Push once  dextrose 50% Injectable 12.5 Gram(s) IV Push once  dextrose 50% Injectable 25 Gram(s) IV Push once  DULoxetine 30 milliGRAM(s) Oral daily  gabapentin 900 milliGRAM(s) Oral every 8 hours  glucagon  Injectable 1 milliGRAM(s) IntraMuscular once  heparin  Infusion.  Unit(s)/Hr (24 mL/Hr) IV Continuous <Continuous>  insulin lispro (ADMELOG) corrective regimen sliding scale   SubCutaneous three times a day before meals  insulin lispro (ADMELOG) corrective regimen sliding scale   SubCutaneous at bedtime  lidocaine   4% Patch 1 Patch Transdermal daily  lidocaine   4% Patch 1 Patch Transdermal daily  melatonin 3 milliGRAM(s) Oral at bedtime  multivitamin 1 Tablet(s) Oral daily  mupirocin 2% Ointment 1 Application(s) Topical two times a day  pantoprazole    Tablet 40 milliGRAM(s) Oral before breakfast  piperacillin/tazobactam IVPB.. 3.375 Gram(s) IV Intermittent every 8 hours  polyethylene glycol 3350 17 Gram(s) Oral daily  QUEtiapine 25 milliGRAM(s) Oral at bedtime  senna 2 Tablet(s) Oral at bedtime  sevelamer carbonate 800 milliGRAM(s) Oral three times a day with meals    MEDICATIONS  (PRN):  acetaminophen     Tablet .. 650 milliGRAM(s) Oral every 6 hours PRN Temp greater or equal to 38C (100.4F), Mild Pain (1 - 3), Moderate Pain (4 - 6)  albuterol/ipratropium for Nebulization 3 milliLiter(s) Nebulizer every 6 hours PRN Shortness of Breath and/or Wheezing  cyclobenzaprine 5 milliGRAM(s) Oral three times a day PRN Muscle Spasm  dextrose Oral Gel 15 Gram(s) Oral once PRN Blood Glucose LESS THAN 70 milliGRAM(s)/deciliter  heparin   Injectable 55609 Unit(s) IV Push every 6 hours PRN For aPTT less than 40  heparin   Injectable 5000 Unit(s) IV Push every 6 hours PRN For aPTT between 40 - 57  HYDROmorphone  Injectable 0.5 milliGRAM(s) IV Push every 6 hours PRN dressing changes and severe breakthrough      PHYSICAL EXAMINATION  General: Alert and cooperative. Sitting in recliner, resting comfortably. No apparent distress noted.   HEENT: Normocephalic/atraumatic.   Cardiovascular: Regular rhythm. + Sinus tachycardia    Respiratory: + NC. Breath sounds clear w/ diminished bases bilaterally without wheezing. No accessory muscles used.   Abdomen: + Central obesity. + PEG c/d/i. Soft, nontender, nondistended.   Skin: Warm to touch.   Extremities: + Full range of motion on upper extremities. Unable to move lower extremities. B/L lower extremities wrapped with gauze   Neuro: AxO x 3. Follow commands.     LABS:                        7.8    10.41 )-----------( 500      ( 13 Apr 2025 00:50 )             27.3           PTT - ( 14 Apr 2025 00:28 )  PTT:55.9 sec          PAST MEDICAL & SURGICAL HISTORY:  HTN (hypertension)      Atrial fibrillation  paroxysmal      Papilledema of both eyes      Chronic sinusitis      Morbid obesity      No significant past surgical history          FAMILY HISTORY:      Social History:  sexually active with female partner occasionally (26 Feb 2025 01:48)      RADIOLOGY:  [ ] Reviewed and interpreted by me    PULMONARY FUNCTION TESTS:    EKG:

## 2025-04-14 NOTE — CONSULT NOTE ADULT - PROVIDER SPECIALTY LIST ADULT
Pain Medicine
Thoracic Surgery
Wound Care
Palliative Care
Podiatry
Rehab Medicine
Rehab Medicine
Surgery
Podiatry
Infectious Disease
Vascular Surgery

## 2025-04-14 NOTE — CONSULT NOTE ADULT - ATTENDING COMMENTS
This is a 38 y/o M w/ PMHx AF (not on AC), HTN, BEA, pseudotumor cerebri s/p LP in 2024, initially presented to [facility] on 2/24, SOB, LE edema with URI symptoms and sick contacts, noted to have severe ARDS, intubated however still hypoxia, cannulated for VV ECMO on 2/25, transferred to [facility] on 2/25, started on empiric Vanco, Zosyn for multifocal PNA, abdominal wall cellulitis, s/p trach placement by CT surgery on 3/7, ECMO decannulated on 3/7. Zosyn held on 3/9.  C/b fevers on 3/10, restarted on Zosyn, transferred to [facility] on 3/13, Bcx now w/ yeast.    #Fungemia   #Fevers   #Multifocal PNA, abdominal wall cellulitis s/p Vanco/Zosyn  #ARDS, hypoxic respiratory failure requiring VV EMCO 2/2 multifocal PNA? s/p decannulation on 3/7    Overall, 38 y/o M w/ PMHx AF (not on AC), HTN, BEA, pseudotumor cerebri s/p LP in 2024 admitted to [facility] on 2/26 for ARDS, hypoxic respiratory failure requiring VV EMCO 2/2 multifocal PNA? s/p decannulation on 3/7, c/b abdominal wall cellulitis s/p Vancomycin/Zosyn, s/p trach on 3/7, in the setting of persistent fevers despite Zosyn, BCx now w/ yeast.   Unclear source for yeast in BCx, pt had all central lines removed on 3/7, not getting TPN, no abdominal pain on exam, PEG site well appearing.     Recommendations;   1. Caspofungin 70 mg daily (given weight). Can finish 7 days of Zosyn tomorrow. F/u ID of yeast   2. Repeat BCx until negative x 48 hours   3. TTE, possible ROBERT if not clearing    4. CT A/P w/ IV contrast   5. Monitor for any visual changes, low threshold for opthalmology c/s if c/f endophthalmitis
bl feet blisters, likely from pressure   no signs of infection or ischemia  legs are swollen so unable to palpate pulses but pt has good signals
morbidly obese patient on therapeutic anti-coagulation  infected sacral ulcer most safely debrided in OR  added on; hold heparin infusion on-call to OR
40 yo M w/ class III obesity, pAfib (no AC), HTN, BEA (on CPAP), history of b/l papilledema attributed to pseudotumor cerebri s/p LP in 2024 with very high opening pressure, who is transferred for VV ECMO for ARDS and severe hypoxia. Patient cannulated for VV ECMO on 2/25 and transferred to [facility] for further management. Palliative care consulted for goals of care and complex medical decision making.     Pt seen this afternoon, intubated, on VV ecmo; family was not present during eval; per chart review patient has a life partner and also mom and brother. Discussed case with micu team; patient primarily with respiratory failure, currently on IV abx for possible PNA;       Palliative team following ongoing support for patient on ECMO circuit. Discussed case with MICU team caring for patient who are continuing to provide communication with interdisciplinary teams and patient's family regarding patient's clinical status and medical updates.

## 2025-04-14 NOTE — CONSULT NOTE ADULT - CONSULT REQUESTED DATE/TIME
04-Mar-2025 16:49
26-Feb-2025 14:17
04-Mar-2025 21:24
14-Apr-2025 11:01
05-Mar-2025
17-Mar-2025 09:41
19-Mar-2025 13:13
17-Mar-2025 09:07
20-Mar-2025 10:58
27-Mar-2025 11:31
28-Mar-2025 15:42

## 2025-04-14 NOTE — CONSULT NOTE ADULT - SUBJECTIVE AND OBJECTIVE BOX
GENERAL SURGERY CONSULT NOTE  Consulting surgical team: Acute Care Surgery/B Team  Consulting attending: Dr. Omalley     HPI:  38 yo M w/ class III obesity, pAfib (no AC), HTN, BEA (on CPAP), history of b/l papilledema attributed to pseudotumor cerebri s/p LP in 2024 with very high opening pressure, who is transferred for VV ECMO for ARDS and severe hypoxia. Patient initially presented to [facility] on 2/24 for SOB and b/l LE edema. 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 He is a current every day smoker of [place] and marijuana, but does not drink or do other drugs. Currently lives with his mother. There is a long term partner in his life, but they are not , and they have an on/off relationship currently not very involved with each other as per the patient's mother and aunt.  At [facility] where he was getting an eval last year for shortness of breath, he had a sleep study and was diagnosed with sleep apnea, prescribed a PAP machine, which he has in his room but the mother is not sure how many nights per week he uses it. Recently, the last day or two, his mother and brother have been under the weather with URIs and the patient 2 days ago started to develop a ruynny nose, ough, some wheezing of the chest, as well as worsening shortness of breath and fevers at home. He called his aunt who told him to go get checked out and then he landed at the [facility] ED. Patient found to be reportedly hypoxemic to 70% on RA with worsening respiratory status/secretions and somnolence in the ED. Patient was intubated with difficulty (7 attempts) due to very large tongue secretions. Despite optimal vent management, patient remained hypoxemic. Unable to prone due to obesity. Patient cannulated for VV ECMO on 2/25 and transferred to [facility] for further management.     [facility] labs notable for MRSA PCR -, Fluvid -, urine legionella -, sputum gram stain  with GPC and GPR    CTA chest on 2/24 negative for pulmonary embolism, notable for patchy bilateral lower lobe opacities, hepatomegaly, mild ascites, edema/induration of the abdominal pannus.    LE duplex on 2/25 negative for DVT    TTE on 2/25 showed LV EF 61%, enlarged RV with TAPSE 2.1cm, ePASP at least 49 (Patient had 10/2024 TTE with normal LV and RV with ePASP 14).    Only home med is Lasix. Not on acetazolamide for pseudotumor or on Wegovy (was pending auth) (26 Feb 2025 01:48)    Acute Care Surgery consulted for debridement/I&D evaluation of sacral decubitus wound       PAST MEDICAL HISTORY:  HTN (hypertension)    Atrial fibrillation    Papilledema of both eyes    Chronic sinusitis    Morbid obesity        PAST SURGICAL HISTORY:  No significant past surgical history        MEDICATIONS:  acetaminophen     Tablet .. 650 milliGRAM(s) Oral every 6 hours PRN  albuterol/ipratropium for Nebulization 3 milliLiter(s) Nebulizer every 6 hours PRN  amLODIPine   Tablet 10 milliGRAM(s) Oral daily  capsaicin HP 0.075% Cream 1 Application(s) Topical two times a day  caspofungin IVPB 70 milliGRAM(s) IV Intermittent every 24 hours  chlorhexidine 2% Cloths 1 Application(s) Topical <User Schedule>  clotrimazole 1% Cream 1 Application(s) Topical two times a day  cyclobenzaprine 5 milliGRAM(s) Oral three times a day PRN  Dakins Solution - 1/4 Strength 1 Application(s) Topical two times a day  dextrose 50% Injectable 25 Gram(s) IV Push once  dextrose 50% Injectable 12.5 Gram(s) IV Push once  dextrose 50% Injectable 25 Gram(s) IV Push once  dextrose Oral Gel 15 Gram(s) Oral once PRN  DULoxetine 30 milliGRAM(s) Oral daily  gabapentin 900 milliGRAM(s) Oral every 8 hours  glucagon  Injectable 1 milliGRAM(s) IntraMuscular once  heparin   Injectable 24069 Unit(s) IV Push every 6 hours PRN  heparin   Injectable 5000 Unit(s) IV Push every 6 hours PRN  heparin  Infusion.  Unit(s)/Hr IV Continuous <Continuous>  HYDROmorphone  Injectable 0.5 milliGRAM(s) IV Push every 6 hours PRN  insulin lispro (ADMELOG) corrective regimen sliding scale   SubCutaneous three times a day before meals  insulin lispro (ADMELOG) corrective regimen sliding scale   SubCutaneous at bedtime  lidocaine   4% Patch 1 Patch Transdermal daily  lidocaine   4% Patch 1 Patch Transdermal daily  melatonin 3 milliGRAM(s) Oral at bedtime  multivitamin 1 Tablet(s) Oral daily  mupirocin 2% Ointment 1 Application(s) Topical two times a day  pantoprazole    Tablet 40 milliGRAM(s) Oral before breakfast  piperacillin/tazobactam IVPB.. 3.375 Gram(s) IV Intermittent every 8 hours  polyethylene glycol 3350 17 Gram(s) Oral daily  QUEtiapine 25 milliGRAM(s) Oral at bedtime  senna 2 Tablet(s) Oral at bedtime  sevelamer carbonate 800 milliGRAM(s) Oral three times a day with meals      ALLERGIES:  No Known Allergies      VITALS & I/Os:  Vital Signs Last 24 Hrs  T(C): 36.8 (14 Apr 2025 05:30), Max: 36.9 (13 Apr 2025 16:58)  T(F): 98.2 (14 Apr 2025 05:30), Max: 98.4 (13 Apr 2025 16:58)  HR: 111 (14 Apr 2025 11:29) (104 - 111)  BP: 158/105 (14 Apr 2025 05:30) (141/89 - 158/105)  BP(mean): 121 (14 Apr 2025 05:30) (108 - 121)  RR: 21 (14 Apr 2025 05:30) (18 - 21)  SpO2: 100% (14 Apr 2025 11:29) (91% - 100%)    Parameters below as of 14 Apr 2025 05:30  Patient On (Oxygen Delivery Method): nasal cannula w/ humidification  O2 Flow (L/min): 2      I&O's Summary    13 Apr 2025 07:01  -  14 Apr 2025 07:00  --------------------------------------------------------  IN: 548 mL / OUT: 4800 mL / NET: -4252 mL        PHYSICAL EXAM:  GEN: resting comfortably in bed, in NAD  SKIN: gluteal folds with denuded epidermis; R gluteal fold/sacrum with decubitus wound probed to muscle, unable to feel bone, tunneling in superior direction medially and laterally, no feculent material, expression of purulent drainage   NEURO: AAOx4, no focal neuro deficits; CN II-IX intact    LABS:                        8.0    9.55  )-----------( 491      ( 14 Apr 2025 05:50 )             23.4     04-14    141  |  98  |  11  ----------------------------<  93  3.5   |  32[H]  |  1.49[H]    Ca    9.5      14 Apr 2025 05:50  Phos  5.1     04-14  Mg     1.90     04-14      Lactate:    PT/INR - ( 14 Apr 2025 05:50 )   PT: 15.9 sec;   INR: 1.34 ratio         PTT - ( 14 Apr 2025 11:23 )  PTT:59.0 sec          Urinalysis Basic - ( 14 Apr 2025 05:50 )    Color: x / Appearance: x / SG: x / pH: x  Gluc: 93 mg/dL / Ketone: x  / Bili: x / Urobili: x   Blood: x / Protein: x / Nitrite: x   Leuk Esterase: x / RBC: x / WBC x   Sq Epi: x / Non Sq Epi: x / Bacteria: x        IMAGING:  < from: MR Pelvis Bony Only w/wo IV Cont (04.11.25 @ 15:53) >  FINDINGS:    OSSEOUS STRUCTURES    Fractures/Stress Reactions:  None.    Osteomyelitis: None.    VISUALIZED SPINE  S1 is transitional and lumbarized. Moderate L5-S1 degenerative disc   disease.    PELVIC JOINTS    Sacroiliac Joints:  Preserved.    Symphysis Pubis:  Preserved.    RIGHT HIP JOINT    Avascular Necrosis:  None.    Articular Cartilage:  Grossly preserved given the large field of view.    Effusion/Synovitis:  Small effusion with mild synovitis.    RIGHT HIP TENDONS    Gluteus Minimus Tendon:  Intact.    Gluteus Medius Tendon:  Mild to moderate tendinopathy.    Iliopsoas Tendon:  Intact.    Common Hamstring Tendon:  Intact.    Bursa:  None.    LEFT HIP JOINT    Avascular Necrosis:  None.    Articular Cartilage:  Grossly preserved given the large field of view.    Effusion/Synovitis:  Small effusion with mild synovitis.    LEFT HIP TENDONS    Gluteus Minimus Tendon:  Intact.    Gluteus Medius Tendon:  Mild to moderate tendinopathy.    Iliopsoas Tendon:  Intact.    Common Hamstring Tendon:  Intact.    Bursa:  None.    SOFT TISSUES    Pelvis/Abdomen:  Moderate fecal distention of the visualized sigmoid   colon.    Musculature:  Moderate to severe generalized edema of the bilateral   gluteal musculature. Mild lower paraspinal, adductor, and quadriceps   muscle edema. Sacral decubitus wound with region of decreased enhancement   of the right gluteus zurdo measuring up to 11.9 cm transversely. Tract   with small amount of fluid and gas is again seen extending superiorly   within the right gluteal subcutaneous tissues to the level of L5.    Subcutaneous Tissues:  Mild gluteal edema around the sacral wound with   the tract extending superiorly to the level of L5.    NEUROVASCULAR STRUCTURES    Sacral Neuroforamina:  Widely patent.    Neural Plexuses:  Maintained.    IMPRESSION:  1.  No osteomyelitis.  2.  Sacral decubitus wound is again seen extending superiorly along the   superficial margin of the right gluteus zurdo to the level of L5 with   small amount of fluid and gas within the tract.  3.  The underlying gluteus zurdo demonstrates a region of decreased   enhancement consistent with ischemia or necrosis measuring up to 11.9 cm   transversely.  4.  Muscle edema suggests a nonspecific myositis.  5.  Small bilateral hip joint effusions with mild synovitis is   nonspecific.    < end of copied text >

## 2025-04-14 NOTE — PROGRESS NOTE ADULT - ASSESSMENT
40 YO M with PMHx of morbid obesity, BEA on CPAP, pAFIB (not on AC), HTN, and BL papilledema attributed to pseudotumor cerebri who presented initially to [facility] on 2/24 with SOB and BL LE edema. Found with URI outpatient with progressive SOB, and concern for noted for MFPNA on imaging. Course progressed with AHRF with worsening O2 demand, respiratory distress, secretions, and somnolence requiring intubation (difficult with success after 6 attempts) in ED and admission to [facility] MICU. While in MICU, patient noted with increasing O2 demand with no improvement despite optimal vent management. Concern noted for progressive ARDS, unable to prone given morbid obesity, and ultimately cannulated for vvECMO on 2/25 and transferred to [facility] for further management on 2/26. While at [facility], tx with diuresis and ABX, and ultimately decannulated and s/p 60XLTCP trach and PEG on 3/7. Patient ultimately transferred to RCU on 3/11 and course complicated by recurrent high grade fevers and found with fungemia.    NEUROLOGY  # Hx of Pseudotumor Cerebri   - s/p LP in 2024 with high opening pressure  - s/p MRI 2024 with possible pituitary mass but unclear because of pressure effect from pseudotumor cerebri causing partially empty sella.  - Prolactin elevated   - ACTH low but received steroids during admission   - FT4 low normal and TSH normal, but given FT4 low normal TSH should be higher   - Discuss endocrine consult for inhouse work up vs outpatient.     # Sedation   - Weaned off sedation in ICU   - Nimbex turned off 2/27   - Prop turned off 3/9   - Precedex turned off and catapres started 3/11  - catapres 0.2 TID (decreased 3/25) - tapered down 3/31 to 0.1 tid - discontinued 4/3     # Insomnia   - Patient worked night shift x 15 years   - Trouble sleeping at night leading to day time sleepiness and poor participation with PT  - Continue on Seroquel 25 QHS (increased 3/18)   - Continue on melatonin   - Continue on Neurontin as below    # BL LE neuropathy   - CIPN concern   - Neurontin increased to 300mg Q8H with relief   - added Percocet Q4H for moderate pain (3/25) and continue on Dilaudid Q6H for severe pain   - Pain mgt called to consult - recs for tylenol ATC, NEurontin increased to 400mg q6, Oxycodone 5 q4h prn , Dilaudid for BREAKTHROUGH pain only, Lidocaine patches on each leg, ice prn to feet  - Some improvement in pain - PT consult for TENS,   - oxycodone inc to 10mg as pt with no relief /buttocks area indurated /tender CT ordered -no abscess  - C/w Gabapentin : 600/600/800 and up titrate as required  - Switched to PO Dilaudid 2mg Q6H PRN moderate-severe and IV Dilaudid PRN severe breakthrough and with dressing changes   - Still with severe pain 10/10 despite change to Dilaudid will increase to 4mg q4h prn with iv Dilaudid breakthrough  - Neurontin increased to 800mg q8h Monitor neuro status/ lethargy   - I have reviewed with pt to not wait to long for dose of pain medication to  prevent pain from escalating   - Meds reviewed despite being in constant pain pt not taking Dilaudid as often as he can often with 10/10 pain Dilaudid will be every 4 hours with parameters and pt can refuse Will reconsult pain mgt again for any recommendations on Monday   - Pain management reconsulted , recs to stop Dilaudid PO ATC , continue Dilaudid for breakthrough pain with dressing changes, increase gabapentin to 900 mg Q8, add Cymbalta 30 mg QD and Flexeril 5mg Q8  - Neurontin inc to 900 with some decrease in pain but still severe   - Will do trial of capsaicin bid with strict instructions to avoid open /broken irritated skin/ and bandaged areas     CARDIOVASCULAR  # HTN   - HTN noted in ICU requiring cardene and esmolol GTTs   - Lisinopril dc'ed to increase catapres   - Continue on norvasc 10, catapres weaned off 4/3  - Monitor BP     # AFIB   - Hx of pAFIB   - No RVR episodes or pAFIB episodes in ICU   - No rate control medications needed  - Monitor on telemetry     # RV dilation second to PHTN   - POCUS on arrival to [facility] with RVE concern   - TTE 10/2024 with EF 55-60 with normal LVSF, moderate LVH and normal RVSF and size with no concern for pHTN   - TTE on 2/25 at  with EF 61 and normal LVSF, but enlarged RV size with reduced RVSF, mild TR, mild PHTN with PASP 49, and dilated IVC to 3.4cn, but normal TAPSE 2.1.  - Continue on aggressive diuresis in ICU    - TTE 3/11 with RVSF reduced with TAPSE 1.6. IVC improved to 2.12cm.   - Lasix 40 PO QD discontinued 4/7 secondary to ELLA  - Monitor IO/ BMP    RESPIRATORY  # ARDS second to MFPNA   - Hx of BEA on CPAP presented to  post URI with SOB and found with AHRF with progression to ARDS second to post viral MFPNA   - Intubated 2/25   - Cannulated for vvECMO 2/26   - s/p 60XLTCP tracheostomy 3/7   - Decannulated 3/7   - CTSx removed tracheostomy and ECMO sutures on 3/17   - Continue on TC QD with PMV as able with strong phonation  - Continue on nebs and HTS   -  trials continue and now trialing overnight with BIPAP for OHS (10/8/12)  - Continue on PS 18/10/40 QHS given OHS   - Decannulated 3/28   - c/w nasal cannula 2L     GI  # Dysphagia   - s/p PEG 3/7   - Attempted green dye on 3/13 and failed  - Continue on TF  - Continue on miralax and senna  - RPT green dye passed 3/19  - 3/20 Passed FEEST - on soft bite sized with thin liquids Added consistent carb 2/2 weight/Elevated A1c   - plan for OR 4/15 for PEG removal with cardiothoracic surgery   - Hold Eliquis held for 72 hours last dose 4/12 AM - will begin Heparin drip to start today for bridge to procedure     # Mild transaminitis   - LFTs elevated in ICU with TBILI elevated likely from ECMO hemolysis   - US ABD with hepatic steatosis   - Trend LFTs      RENAL  # ELLA   - ELLA likely from cardiorenal with RVE   - Diuresed in ICU and improved   - Monitor renal function and UOP   - Recurrent ELLA noted suspected in s/o vancomycin toxicity (4/7)   - UA negative and Urine Studies FeNa 0.9 % suggest pre-renal etiology  - Lasix discontinued for now  - s/p IV fluid trial on 4/8 with minimal improvement  - Trialing additional fluids on 4/10   - Trend BMP and I/Os    # Hypernatremia (resolved)  - Hypernatremia with fever spikes   - Fever curve improved and attempting to wean FWF   - now off FWF    INFECTIOUS DISEASE  # Recurrent fevers with fungemia from unknown source   - Recurrent fevers post ECMO decannulation thought initially to be cytokine surge   - BCx, SCx, UCx, RVP and MRSA RPT on 3/12 negative   - Completed zosyn (3/12-3/18) empirically with improved WBC , however recurrent fevers noted.   - RPT SCx, UA, CXR and RVP negative  - TTE 3/17 with no IE  - PanCT 3/17 with PNA and persistent panniculitis  - BCx 3/15 and 3/16 with candida albicans, rpt BCx 3/18 and 3/20 negative   - Concern for possible source from panniculitis   - Continue on Caspofungin 70 mg daily 3/17 - 4/15, ID consult appreciated   - 3/30 spiked temp cx's sent + ua and c/s pending Bcx neg to date - started on vanco /zosyn ID following  - Vanco stopped (4/7) due to ELLA concern for Vanco toxicty and continued on empiric Zosyn/ Caspofungin   - 4/3 - persistent fevers, leukocytosis. Will repeat CT C/A/P to evaluate for source - no source found - Bilat lung opacities mildly decreased     - 4/8 bone scan: : "1. Large area of soft tissue infection in the R side of the pelvis as detailed above. Slightly more intense activity in the R iliac bone compared to the L raises possibility of OM. 2. Intense activity in the lateral aspect of the R forefoot compatible with soft tissue infection; complicating osteomyelitis is not excluded. Milder activity in the medial aspect of the L forefoot may reflect soft tissue infection."  - ID consult appreciated. Continue on Zosyn 3.375 g 8, plan on 6 week course for possible OM until 5/11 and Caspofungin 70 mg daily, will plan on 4 week course ending 4/15  - MR Pelvis Bony (4/11): No osteomyelitis. Sacral decubitus wound is again seen extending superiorly along the superficial margin of the right gluteus zurdo to the level of L5 with small amount of fluid and gas within the tract. The underlying gluteus zurdo demonstrates a region of decreased enhancement consistent with ischemia or necrosis measuring up to 11.9 cm transversely. Muscle edema suggests a nonspecific myositis. Small bilateral hip joint effusions with mild synovitis is nonspecific.    # MFPNA and abdominal pannus cellulitis   - Presented to  post URI with SOB and found with AHRF with progression to ARDS second to post viral MFPNA   - RVP, legionella, strept, BAL, BCx, UCx, HIV, PCP, and adenovirus PCR negative   - Initially started on multiple agents in ICU and ultimately completed zosyn (2/26-3/9) ZMAX (2/25-2/26) and vanco (2/26-3/1)     # R/o Sacral OM  - Noted with hard indurated sacral wound by Wound Care Team  - CT Pelvis (3/29) revealing superficial skin thickening overlying the sacrum with underlying edema of the subcutaneous fat, in keeping with the clinical history of sacral wound. No evidence of underlying tract or drainable fluid collection.  - CT 4/3 - no fluid collection in sacral tissue   - Given ongoing fever with no clear source with obtain MRI Pelvis per ID recs to r/o sacral OM once renal fx improves    # Penile discharge   - GC/ chlamydia negative   - HIV negative   - Monitor for now    # PPX   - MRSA PCR positive and completed bactroban (2/26-3/3)     HEME   # Anemia likely second to ECMO circuit vs AOCD   - HH low in ICU second to ECMO circuit   - HH dropping again today however no bleeding noted   - Microcytic and hemochromic, sent anemia panel 3/19  - Trend HH     VASCULAR   # R IJ and BL LE DVTs   - Post ECMO dopplers on 3/9 negative for BL UE/ LE DVTs.   - Subsequent post ECMO dopplers performed on 3/14 and noted with acute, non-occlusive deep vein thrombosis noted within the right internal jugular vein. Acute, occlusive deep vein thromboses noted within the right and left peroneal veins at both mid calves, and age-indeterminate, occlusive deep vein thrombosis visualized within the left gastrocnemius vein at the proximal calf.     - Continue on argatroban GTT and changed to Eliquis   - last dose of Eliquis Saturday morning 4/12 in AM in preparation for PEG removal, plan to begin heparin drip today    PODIATRY   # BL plantar feet blisters likely pressors induced  - Seen by podiatry and blisters lanced on 3/4 and again on 3/18  - Continue on local wound care per podiatry   - Podiatry following  - OK for WBAT will fu with PT for offloading shoe if appropriate  - Pain mgt consult   - Podiatry FU 3/28 / 4/3 done /following     ENDOCRINE  # Pre-DM2   - A1C 6.7  - Continue on ISS   - Monitor FS   - IF no demand then stop ISS     LINES/ TUBES  - R IJ and R FEM ECMO (2/26-3/7)     SKIN  # Pannus canndial intertrigo   # Sacrum/ buttock MAD  - Continue on clotrimazole cream   - WOC appreciated   -Seen by WC pt noted to have oozing from Buttock wound surgicel placed by wound care On follow up no further bleeding  - Wound care placed orders   - No bleeding from wound     ETHICS/ GOC    - FULL CODE     DISPO - PT following  Seen by PMR for acute rehab per recs   38 YO M with PMHx of morbid obesity, BEA on CPAP, pAFIB (not on AC), HTN, and BL papilledema attributed to pseudotumor cerebri who presented initially to [facility] on 2/24 with SOB and BL LE edema. Found with URI outpatient with progressive SOB, and concern for noted for MFPNA on imaging. Course progressed with AHRF with worsening O2 demand, respiratory distress, secretions, and somnolence requiring intubation (difficult with success after 6 attempts) in ED and admission to [facility] MICU. While in MICU, patient noted with increasing O2 demand with no improvement despite optimal vent management. Concern noted for progressive ARDS, unable to prone given morbid obesity, and ultimately cannulated for vvECMO on 2/25 and transferred to [facility] for further management on 2/26. While at [facility], tx with diuresis and ABX, and ultimately decannulated and s/p 60XLTCP trach and PEG on 3/7. Patient ultimately transferred to RCU on 3/11 and course complicated by recurrent high grade fevers and found with fungemia.    NEUROLOGY  # Hx of Pseudotumor Cerebri   - s/p LP in 2024 with high opening pressure  - s/p MRI 2024 with possible pituitary mass but unclear because of pressure effect from pseudotumor cerebri causing partially empty sella.  - Prolactin elevated   - ACTH low but received steroids during admission   - FT4 low normal and TSH normal, but given FT4 low normal TSH should be higher   - Discuss endocrine consult for inhouse work up vs outpatient.     # Sedation   - Weaned off sedation in ICU   - Nimbex turned off 2/27   - Prop turned off 3/9   - Precedex turned off and catapres started 3/11  - catapres 0.2 TID (decreased 3/25) - tapered down 3/31 to 0.1 tid - discontinued 4/3     # Insomnia   - Patient worked night shift x 15 years   - Trouble sleeping at night leading to day time sleepiness and poor participation with PT  - Continue on Seroquel 25 QHS (increased 3/18)   - Continue on melatonin   - Continue on Neurontin as below    # BL LE neuropathy   - CIPN concern   - Neurontin increased to 300mg Q8H with relief   - added Percocet Q4H for moderate pain (3/25) and continue on Dilaudid Q6H for severe pain   - Pain mgt called to consult - recs for tylenol ATC, NEurontin increased to 400mg q6, Oxycodone 5 q4h prn , Dilaudid for BREAKTHROUGH pain only, Lidocaine patches on each leg, ice prn to feet  - Some improvement in pain - PT consult for TENS,   - oxycodone inc to 10mg as pt with no relief /buttocks area indurated /tender CT ordered -no abscess  - C/w Gabapentin : 600/600/800 and up titrate as required  - Switched to PO Dilaudid 2mg Q6H PRN moderate-severe and IV Dilaudid PRN severe breakthrough and with dressing changes   - Still with severe pain 10/10 despite change to Dilaudid will increase to 4mg q4h prn with iv Dilaudid breakthrough  - Neurontin increased to 800mg q8h Monitor neuro status/ lethargy   - I have reviewed with pt to not wait to long for dose of pain medication to  prevent pain from escalating   - Meds reviewed despite being in constant pain pt not taking Dilaudid as often as he can often with 10/10 pain Dilaudid will be every 4 hours with parameters and pt can refuse Will reconsult pain mgt again for any recommendations on Monday   - Pain management reconsulted , recs to stop Dilaudid PO ATC , continue Dilaudid for breakthrough pain with dressing changes, increase gabapentin to 900 mg Q8, add Cymbalta 30 mg QD and Flexeril 5mg Q8  - Neurontin inc to 900 with some decrease in pain but still severe   - Will do trial of capsaicin bid with strict instructions to avoid open /broken irritated skin/ and bandaged areas     CARDIOVASCULAR  # HTN   - HTN noted in ICU requiring cardene and esmolol GTTs   - Lisinopril dc'ed to increase catapres   - Continue on norvasc 10, catapres weaned off 4/3  - Monitor BP     # AFIB   - Hx of pAFIB   - No RVR episodes or pAFIB episodes in ICU   - No rate control medications needed  - Monitor on telemetry     # RV dilation second to PHTN   - POCUS on arrival to [facility] with RVE concern   - TTE 10/2024 with EF 55-60 with normal LVSF, moderate LVH and normal RVSF and size with no concern for pHTN   - TTE on 2/25 at  with EF 61 and normal LVSF, but enlarged RV size with reduced RVSF, mild TR, mild PHTN with PASP 49, and dilated IVC to 3.4cn, but normal TAPSE 2.1.  - Continue on aggressive diuresis in ICU    - TTE 3/11 with RVSF reduced with TAPSE 1.6. IVC improved to 2.12cm.   - Lasix 40 PO QD discontinued 4/7 secondary to ELLA  - Monitor IO/ BMP    RESPIRATORY  # ARDS second to MFPNA   - Hx of BEA on CPAP presented to  post URI with SOB and found with AHRF with progression to ARDS second to post viral MFPNA   - Intubated 2/25   - Cannulated for vvECMO 2/26   - s/p 60XLTCP tracheostomy 3/7   - Decannulated 3/7   - CTSx removed tracheostomy and ECMO sutures on 3/17   - Continue on TC QD with PMV as able with strong phonation  - Continue on nebs and HTS   -  trials continue and now trialing overnight with BIPAP for OHS (10/8/12)  - Continue on PS 18/10/40 QHS given OHS   - Decannulated 3/28   - c/w nasal cannula 2L     GI  # Dysphagia   - s/p PEG 3/7   - Attempted green dye on 3/13 and failed  - Continue on TF  - Continue on miralax and senna  - RPT green dye passed 3/19  - 3/20 Passed FEEST - on soft bite sized with thin liquids Added consistent carb 2/2 weight/Elevated A1c   - plan for OR 4/15 for PEG removal with cardiothoracic surgery     # Mild transaminitis   - LFTs elevated in ICU with TBILI elevated likely from ECMO hemolysis   - US ABD with hepatic steatosis   - Trend LFTs      RENAL  # ELLA   - ELLA likely from cardiorenal with RVE   - Diuresed in ICU and improved   - Monitor renal function and UOP   - Recurrent ELLA noted suspected in s/o vancomycin toxicity (4/7)   - UA negative and Urine Studies FeNa 0.9 % suggest pre-renal etiology  - Lasix discontinued for now  - s/p IV fluid trial on 4/8 with minimal improvement  - Trialing additional fluids on 4/10   - Trend BMP and I/Os    # Hypernatremia (resolved)  - Hypernatremia with fever spikes   - Fever curve improved and attempting to wean FWF   - now off FWF    INFECTIOUS DISEASE  # Recurrent fevers with fungemia from unknown source   - Recurrent fevers post ECMO decannulation thought initially to be cytokine surge   - BCx, SCx, UCx, RVP and MRSA RPT on 3/12 negative   - Completed zosyn (3/12-3/18) empirically with improved WBC , however recurrent fevers noted.   - RPT SCx, UA, CXR and RVP negative  - TTE 3/17 with no IE  - PanCT 3/17 with PNA and persistent panniculitis  - BCx 3/15 and 3/16 with candida albicans, rpt BCx 3/18 and 3/20 negative   - Concern for possible source from panniculitis   - Continue on Caspofungin 70 mg daily 3/17 - 4/15, ID consult appreciated   - 3/30 spiked temp cx's sent + ua and c/s pending Bcx neg to date - started on vanco /zosyn ID following  - Vanco stopped (4/7) due to ELLA concern for Vanco toxicty and continued on empiric Zosyn/ Caspofungin   - 4/3 - persistent fevers, leukocytosis. Will repeat CT C/A/P to evaluate for source - no source found - Bilat lung opacities mildly decreased     - 4/8 bone scan: : "1. Large area of soft tissue infection in the R side of the pelvis as detailed above. Slightly more intense activity in the R iliac bone compared to the L raises possibility of OM. 2. Intense activity in the lateral aspect of the R forefoot compatible with soft tissue infection; complicating osteomyelitis is not excluded. Milder activity in the medial aspect of the L forefoot may reflect soft tissue infection."  - ID consult appreciated. Continue on Zosyn 3.375 g 8, plan on 6 week course for possible OM until 5/11 and Caspofungin 70 mg daily, will plan on 4 week course ending 4/15  - MR Pelvis Bony (4/11): No osteomyelitis. Sacral decubitus wound is again seen extending superiorly along the superficial margin of the right gluteus zurdo to the level of L5 with small amount of fluid and gas within the tract. The underlying gluteus zurdo demonstrates a region of decreased enhancement consistent with ischemia or necrosis measuring up to 11.9 cm transversely. Muscle edema suggests a nonspecific myositis. Small bilateral hip joint effusions with mild synovitis is nonspecific.  - Surgery c/s (4/14) - plan for operative sacral wound debridement and I&D today (4/14)    # MFPNA and abdominal pannus cellulitis   - Presented to  post URI with SOB and found with AHRF with progression to ARDS second to post viral MFPNA   - RVP, legionella, strept, BAL, BCx, UCx, HIV, PCP, and adenovirus PCR negative   - Initially started on multiple agents in ICU and ultimately completed zosyn (2/26-3/9) ZMAX (2/25-2/26) and vanco (2/26-3/1)     # R/o Sacral OM  - Noted with hard indurated sacral wound by Wound Care Team  - CT Pelvis (3/29) revealing superficial skin thickening overlying the sacrum with underlying edema of the subcutaneous fat, in keeping with the clinical history of sacral wound. No evidence of underlying tract or drainable fluid collection.  - CT 4/3 - no fluid collection in sacral tissue   - Given ongoing fever with no clear source with obtain MRI Pelvis per ID recs to r/o sacral OM once renal fx improves    # Penile discharge   - GC/ chlamydia negative   - HIV negative   - Monitor for now    # PPX   - MRSA PCR positive and completed bactroban (2/26-3/3)     HEME   # Anemia likely second to ECMO circuit vs AOCD   - HH low in ICU second to ECMO circuit   - HH dropping again today however no bleeding noted   - Microcytic and hemochromic, sent anemia panel 3/19  - Trend HH     VASCULAR   # R IJ and BL LE DVTs   - Post ECMO dopplers on 3/9 negative for BL UE/ LE DVTs.   - Subsequent post ECMO dopplers performed on 3/14 and noted with acute, non-occlusive deep vein thrombosis noted within the right internal jugular vein. Acute, occlusive deep vein thromboses noted within the right and left peroneal veins at both mid calves, and age-indeterminate, occlusive deep vein thrombosis visualized within the left gastrocnemius vein at the proximal calf.     - Continue on argatroban GTT and changed to Eliquis   - on heparin gtt in preparation for PEG removal (4/15) -  hold hep gtt at 12am tonight    PODIATRY   # BL plantar feet blisters likely pressors induced  - Seen by podiatry and blisters lanced on 3/4 and again on 3/18  - Continue on local wound care per podiatry   - Podiatry following  - OK for WBAT will fu with PT for offloading shoe if appropriate  - Pain mgt consult   - Podiatry FU 3/28 / 4/3 done /following     ENDOCRINE  # Pre-DM2   - A1C 6.7  - Continue on ISS   - Monitor FS   - IF no demand then stop ISS     LINES/ TUBES  - R IJ and R FEM ECMO (2/26-3/7)     SKIN  # Pannus canndial intertrigo   # Sacrum/ buttock MAD  - Continue on clotrimazole cream   - WOC appreciated   -Seen by WC pt noted to have oozing from Buttock wound surgicel placed by wound care On follow up no further bleeding  - Wound care placed orders   - No bleeding from wound     ETHICS/ GOC    - FULL CODE     DISPO - PT following  Seen by PMR for acute rehab per recs

## 2025-04-14 NOTE — PROGRESS NOTE ADULT - SUBJECTIVE AND OBJECTIVE BOX
SUBJECTIVE:  Pt seen and examined at the bedside in RCU. NAD.  No new complaints at this time.     Vital Signs Last 24 Hrs  T(C): 36.8 (14 Apr 2025 05:30), Max: 36.9 (13 Apr 2025 16:58)  T(F): 98.2 (14 Apr 2025 05:30), Max: 98.4 (13 Apr 2025 16:58)  HR: 105 (14 Apr 2025 07:55) (104 - 107)  BP: 158/105 (14 Apr 2025 05:30) (141/89 - 158/105)  BP(mean): 121 (14 Apr 2025 05:30) (108 - 121)  RR: 21 (14 Apr 2025 05:30) (18 - 21)  SpO2: 91% (14 Apr 2025 07:55) (91% - 100%)    Parameters below as of 14 Apr 2025 05:30  Patient On (Oxygen Delivery Method): nasal cannula w/ humidification  O2 Flow (L/min): 2      PHYSICAL EXAM:  general: patient laying comfortably in bed, NAD  resp: respirations unlabored, chest rise equal b/l  cardiac: RRR  gi: abd nondistended, soft, nontender to palpation  skin: warm, well perfused, no masses  extremities: no clubbing, cyanosis, edema  tubes: PEG    A/P:  Patient is a 39y Male with PMHx morbid obesity, BEA on CPAP, pAFIB (not on AC), HTN, and BL papilledema attributed to pseudotumor cerebri s/p FB, perc trach, EGD, PEG with Dr Llanes, on 3/7/2025.    - Per RCU ACP - surgery plans to take pt to OR for debridement of gluteus zurdo this afternoon, 4/14/2025  - Continue plan for OR 4/15/25 for PEG removal with Dr Llanes, pending surgery case today, will follow  - Please hold hep gtt at 12am tonight, 4/14/2025. Eliquis last dose 4/12/2025 AM  - Patient understands and agrees to plan at this time  - Rest of care as per primary team

## 2025-04-14 NOTE — CONSULT NOTE ADULT - CONSULT REQUESTED BY NAME
Floor
Primary team
Dr. Rousseau
MICU
MICU
TYSON Ramos
Medicine
Medicine
primary team

## 2025-04-14 NOTE — CHART NOTE - NSCHARTNOTEFT_GEN_A_CORE
38 yo male, BMI 69.6 added to emergency list for sacral debridement.     EMR reviewed, pt is currently afebrile, cultures negative, hemodynamically stable on antibiotics, not on any pressors, recently decannulated trach with questionable patency of stoma. Patient has severe obesity hypoventilation syndrome, required BiPAP overnight. Patient is already scheduled for OR for PEG tube removal on 4/15/25, at 7.30 Am with Dr. Llanes which will require intubation. Proposed procedure requires general anesthesia with intubation, with prone positioning. Patient may need blood transfusion as patient has been on heparin gtt and extent of debridement is unclear.     Anesthesia concerns at this time include difficult airway management (difficult ventilation, difficult intubation, unavailability of adequate trained personnel for management of difficult airway including surgical airway), difficult peripheral access (possible need for transfusion), unavailability of adequate OR supporting staff required for safe positioning. Case discussed over the phone with second anesthesiologist Dr. Camara as well as anesthesia floor leader Dr. Poole, agree to postpone the case in setting of non-emergent nature and reschedule for daytime.     Concerns relayed to surgeon and requested to collaborate with other specialties in order to minimize this patient's trips to OR and reschedule the surgery during daytime when adequate personnel is available and patient care can be delivered safely. Morning anesthesia team made aware.

## 2025-04-14 NOTE — PROGRESS NOTE ADULT - ASSESSMENT
This is a 40 y/o M w/ PMHx AF (not on AC), HTN, BEA, pseudotumor cerebri s/p LP in 2024, initially presented to [facility] on 2/24, SOB, LE edema with URI symptoms and sick contacts, noted to have severe ARDS, intubated however still hypoxia, cannulated for VV ECMO on 2/25, transferred to [facility] on 2/25, started on empiric Vanco, Zosyn for multifocal PNA, abdominal wall cellulitis, s/p trach placement by CT surgery on 3/7, ECMO decannulated on 3/7. Zosyn held on 3/9.  C/b fevers on 3/10, restarted on Zosyn, transferred to [facility] on 3/13, Bcx now w/ yeast.    #Sacral ulcer, with extensive tunneling, no OM on MRI, however c/f OM on WBC scan  #Candida albicans fungemia   #Fevers   #Multifocal PNA, abdominal wall cellulitis s/p Vanco/Zosyn  #ARDS, hypoxic respiratory failure requiring VV EMCO 2/2 multifocal PNA? s/p decannulation on 3/7    Overall, 40 y/o M w/ PMHx AF (not on AC), HTN, BEA, pseudotumor cerebri s/p LP in 2024 admitted to [facility] on 2/26 for ARDS, hypoxic respiratory failure requiring VV EMCO 2/2 multifocal PNA? s/p decannulation on 3/7, c/b abdominal wall cellulitis s/p Vancomycin/Zosyn, s/p trach on 3/7, in the setting of persistent fevers despite Zosyn, BCx now w/ yeast, Candida albicans   Unclear source for yeast in BCx, pt had all central lines removed on 3/7, not getting TPN, no abdominal pain on exam, PEG site well appearing.     CT A/P w/o clear abdominal source of fungemia, possibly 2/2 abdominal wall cellulitis? TTE w/o IE.  BCx 3/18 1/2 positive, 3/20 NGTD x 2.     Pt with worsening sepsis on 3/30, febrile to 103, WBC 16.   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   Pt with deep tunneled wound to left, no drainage, malodor per wound care.   CT A/P w/ b/l pulmonary opacities but improved, no sacral abscess   WBC scan with soft tissue infection R side of pelvis, intense activity in R iliac bone possible OM. Lateral R forefoot with soft tissue infection, possible OM?  Wound examined with wound care, large R buttocks soft tissue ulcer, does not look infected, however deep tunneling with murky fluid, likely source of infection   MRI w/o OM, however given WBC scan findings, will still treat for 6 weeks     Recommendations;   1. Continue with Zosyn 3.375 g 8, plan on 6 week course for possible OM until 5/11  2. Caspofungin 70 mg daily, will plan on 4 week course ending 4/15  3. F/u wound care recs    Thank you for consulting us and involving us in the management of this patient's case. In addition to reviewing history, imaging, documents, labs, microbiology, and infection control strategies and potential issues.     ID will continue to follow    Shailesh Owens M.D.  Attending Physician  Division of Infectious Diseases  Department of Medicine    Please contact through MS Teams message.  Office: [phone] (after 5 PM or weekend)

## 2025-04-14 NOTE — CONSULT NOTE ADULT - ASSESSMENT
39M with PMHx morbid obesity, BEA on CPAP, pAFIB (not on AC), HTN, and BL papilledema attributed to pseudotumor cerebri and complex hospitalization for MFPNA leading to AHRF requiring ECMO s/p decann, trach/PEG, with recurrent fevers, fungemia, now in RCU. Acute Care Surgery consulted for debridement/I&D evaluation of sacral decubitus wound.     Plan:  - Consented and booked for operative sacral wound debridement and I&D today   - NPO with meds  - Hold hep gtt on call to OR   - Document medical and respiratory clearance for operative intervention   - Thoracic Surgery welcome to d/c PEG intra-op, can coordinate   - Appreciate Wound care recs  - Rest of care per primary team     D/w Dr. Shahram HARMON Team, [id number]

## 2025-04-14 NOTE — PROGRESS NOTE ADULT - NS ATTEND AMEND GEN_ALL_CORE FT
38YO Male with obesity (Class III, BMI 69), pAfib (no AC), HTN, BEA (2019 AHI 34 non-compliant on CPAP [id number]), and history of b/l papilledema attributed to pseudotumor cerebri initially presenting as a transfer [facility] on 2/26 for acute hypoxemic respiratory failure s/p ETT intubation/MV with progression to refractory ARDS s/p initiation of vv-ECMO support (2/26-3/7) with failure to wean from ventilator s/p tracheostomy placement, further found to have RV failure s/p aggressive diuresis and PNA followed by severe sepsis 2/2 panniculitis c/b candida albicans fungemia now transferred to [facility] for further medical management.     #Acute hypoxemic and hypercapnic respiratory failure s/p tracheostomy placement   - Decannulated 3/28, continues to use Bilevel NIV qhs    #RV failure 2/2 pulmHTN   - Maintaining euvolemia     #AFib - rate controleld    #DVT right IJ and bilateral peroneal veins - AC for DVT and AFib    #Plan for removal of PEG   - Last dose of Eliquis 6am 4/12, currently on Heparin drip    #Transaminitis improving  -Hepatomegaly will need to follow as outpatient    #Fungemia  #Sacral ulceration with induration  - NM WBC scan performed overnight concerning for active infection with possible osteomyelitis of right pelvis/iliac bone.   Evaluated by wound care team again at bedside 4/10,   Pt will need long-term Abx ~4 weeks through 5/11 and completion of caspofungin 4/15. Surgery consulted for potential surgical debridement.     Neuropathic pain  Evaluated by chronic pain service last week as patient continues to have uncontrolled pain in the LE - Gabapentin being uptitrated , Flexeril and Cymbalta. Dilaudid for dressing changes.     DVT ppx: heparin gtt  Code status: Full code.

## 2025-04-15 ENCOUNTER — TRANSCRIPTION ENCOUNTER (OUTPATIENT)
Age: 40
End: 2025-04-15

## 2025-04-15 ENCOUNTER — APPOINTMENT (OUTPATIENT)
Dept: THORACIC SURGERY | Facility: HOSPITAL | Age: 40
End: 2025-04-15

## 2025-04-15 ENCOUNTER — RESULT REVIEW (OUTPATIENT)
Age: 40
End: 2025-04-15

## 2025-04-15 LAB
ANION GAP SERPL CALC-SCNC: 10 MMOL/L — SIGNIFICANT CHANGE UP (ref 7–14)
APTT BLD: 31 SEC — SIGNIFICANT CHANGE UP (ref 24.5–35.6)
BLD GP AB SCN SERPL QL: NEGATIVE — SIGNIFICANT CHANGE UP
BUN SERPL-MCNC: 10 MG/DL — SIGNIFICANT CHANGE UP (ref 7–23)
CALCIUM SERPL-MCNC: 9.5 MG/DL — SIGNIFICANT CHANGE UP (ref 8.4–10.5)
CHLORIDE SERPL-SCNC: 97 MMOL/L — LOW (ref 98–107)
CO2 SERPL-SCNC: 31 MMOL/L — SIGNIFICANT CHANGE UP (ref 22–31)
CREAT SERPL-MCNC: 1.47 MG/DL — HIGH (ref 0.5–1.3)
EGFR: 63 ML/MIN/1.73M2 — SIGNIFICANT CHANGE UP
EGFR: 63 ML/MIN/1.73M2 — SIGNIFICANT CHANGE UP
GLUCOSE BLDC GLUCOMTR-MCNC: 100 MG/DL — HIGH (ref 70–99)
GLUCOSE BLDC GLUCOMTR-MCNC: 107 MG/DL — HIGH (ref 70–99)
GLUCOSE BLDC GLUCOMTR-MCNC: 108 MG/DL — HIGH (ref 70–99)
GLUCOSE BLDC GLUCOMTR-MCNC: 98 MG/DL — SIGNIFICANT CHANGE UP (ref 70–99)
GLUCOSE SERPL-MCNC: 82 MG/DL — SIGNIFICANT CHANGE UP (ref 70–99)
GRAM STN FLD: ABNORMAL
HCT VFR BLD CALC: 23.7 % — LOW (ref 39–50)
HGB BLD-MCNC: 7.8 G/DL — LOW (ref 13–17)
INR BLD: 1.3 RATIO — HIGH (ref 0.85–1.16)
MAGNESIUM SERPL-MCNC: 1.8 MG/DL — SIGNIFICANT CHANGE UP (ref 1.6–2.6)
MCHC RBC-ENTMCNC: 29.2 PG — SIGNIFICANT CHANGE UP (ref 27–34)
MCHC RBC-ENTMCNC: 32.9 G/DL — SIGNIFICANT CHANGE UP (ref 32–36)
MCV RBC AUTO: 88.8 FL — SIGNIFICANT CHANGE UP (ref 80–100)
NIGHT BLUE STAIN TISS: SIGNIFICANT CHANGE UP
NRBC # BLD AUTO: 0 K/UL — SIGNIFICANT CHANGE UP (ref 0–0)
NRBC # FLD: 0 K/UL — SIGNIFICANT CHANGE UP (ref 0–0)
NRBC BLD AUTO-RTO: 0 /100 WBCS — SIGNIFICANT CHANGE UP (ref 0–0)
PHOSPHATE SERPL-MCNC: 5.2 MG/DL — HIGH (ref 2.5–4.5)
PLATELET # BLD AUTO: 502 K/UL — HIGH (ref 150–400)
POTASSIUM SERPL-MCNC: 3.4 MMOL/L — LOW (ref 3.5–5.3)
POTASSIUM SERPL-SCNC: 3.4 MMOL/L — LOW (ref 3.5–5.3)
PROTHROM AB SERPL-ACNC: 15 SEC — HIGH (ref 9.9–13.4)
RBC # BLD: 2.67 M/UL — LOW (ref 4.2–5.8)
RBC # FLD: 23 % — HIGH (ref 10.3–14.5)
RH IG SCN BLD-IMP: POSITIVE — SIGNIFICANT CHANGE UP
SODIUM SERPL-SCNC: 138 MMOL/L — SIGNIFICANT CHANGE UP (ref 135–145)
SPECIMEN SOURCE: SIGNIFICANT CHANGE UP
SPECIMEN SOURCE: SIGNIFICANT CHANGE UP
WBC # BLD: 8.32 K/UL — SIGNIFICANT CHANGE UP (ref 3.8–10.5)
WBC # FLD AUTO: 8.32 K/UL — SIGNIFICANT CHANGE UP (ref 3.8–10.5)

## 2025-04-15 PROCEDURE — 43247 EGD REMOVE FOREIGN BODY: CPT | Mod: 52

## 2025-04-15 PROCEDURE — G0545: CPT

## 2025-04-15 PROCEDURE — 99233 SBSQ HOSP IP/OBS HIGH 50: CPT

## 2025-04-15 PROCEDURE — 88304 TISSUE EXAM BY PATHOLOGIST: CPT | Mod: 26

## 2025-04-15 PROCEDURE — 99232 SBSQ HOSP IP/OBS MODERATE 35: CPT

## 2025-04-15 RX ORDER — HYDROMORPHONE/SOD CHLOR,ISO/PF 2 MG/10 ML
0.5 SYRINGE (ML) INJECTION
Refills: 0 | Status: DISCONTINUED | OUTPATIENT
Start: 2025-04-15 | End: 2025-04-15

## 2025-04-15 RX ORDER — ONDANSETRON HCL/PF 4 MG/2 ML
4 VIAL (ML) INJECTION ONCE
Refills: 0 | Status: DISCONTINUED | OUTPATIENT
Start: 2025-04-15 | End: 2025-04-15

## 2025-04-15 RX ADMIN — LIDOCAINE HYDROCHLORIDE 1 PATCH: 20 JELLY TOPICAL at 11:49

## 2025-04-15 RX ADMIN — CASPOFUNGIN ACETATE 260 MILLIGRAM(S): 5 INJECTION, POWDER, LYOPHILIZED, FOR SOLUTION INTRAVENOUS at 04:57

## 2025-04-15 RX ADMIN — Medication 0.5 MILLIGRAM(S): at 13:21

## 2025-04-15 RX ADMIN — MUPIROCIN CALCIUM 1 APPLICATION(S): 20 CREAM TOPICAL at 05:19

## 2025-04-15 RX ADMIN — GABAPENTIN 900 MILLIGRAM(S): 400 CAPSULE ORAL at 13:38

## 2025-04-15 RX ADMIN — MUPIROCIN CALCIUM 1 APPLICATION(S): 20 CREAM TOPICAL at 17:10

## 2025-04-15 RX ADMIN — Medication 1 APPLICATION(S): at 17:11

## 2025-04-15 RX ADMIN — Medication 25 GRAM(S): at 21:13

## 2025-04-15 RX ADMIN — Medication 25 GRAM(S): at 05:09

## 2025-04-15 RX ADMIN — Medication 650 MILLIGRAM(S): at 18:18

## 2025-04-15 RX ADMIN — Medication 650 MILLIGRAM(S): at 17:38

## 2025-04-15 RX ADMIN — CLOTRIMAZOLE 1 APPLICATION(S): 1 CREAM TOPICAL at 17:09

## 2025-04-15 RX ADMIN — LIDOCAINE HYDROCHLORIDE 1 PATCH: 20 JELLY TOPICAL at 19:30

## 2025-04-15 RX ADMIN — Medication 25 GRAM(S): at 15:09

## 2025-04-15 RX ADMIN — Medication 1 APPLICATION(S): at 05:20

## 2025-04-15 RX ADMIN — Medication 3 MILLIGRAM(S): at 21:13

## 2025-04-15 RX ADMIN — CLOTRIMAZOLE 1 APPLICATION(S): 1 CREAM TOPICAL at 05:19

## 2025-04-15 RX ADMIN — Medication 100 MILLIEQUIVALENT(S): at 11:48

## 2025-04-15 RX ADMIN — GABAPENTIN 900 MILLIGRAM(S): 400 CAPSULE ORAL at 21:13

## 2025-04-15 RX ADMIN — QUETIAPINE FUMARATE 25 MILLIGRAM(S): 25 TABLET ORAL at 21:13

## 2025-04-15 RX ADMIN — AMLODIPINE BESYLATE 10 MILLIGRAM(S): 10 TABLET ORAL at 05:09

## 2025-04-15 RX ADMIN — GABAPENTIN 900 MILLIGRAM(S): 400 CAPSULE ORAL at 05:09

## 2025-04-15 RX ADMIN — Medication 0.5 MILLIGRAM(S): at 02:24

## 2025-04-15 RX ADMIN — Medication 0.5 MILLIGRAM(S): at 20:11

## 2025-04-15 RX ADMIN — SODIUM HYPOCHLORITE 1 APPLICATION(S): 0.12 SOLUTION TOPICAL at 05:07

## 2025-04-15 RX ADMIN — POLYETHYLENE GLYCOL 3350 17 GRAM(S): 17 POWDER, FOR SOLUTION ORAL at 13:43

## 2025-04-15 RX ADMIN — CYCLOBENZAPRINE HYDROCHLORIDE 5 MILLIGRAM(S): 15 CAPSULE, EXTENDED RELEASE ORAL at 17:38

## 2025-04-15 RX ADMIN — Medication 100 MILLIEQUIVALENT(S): at 10:45

## 2025-04-15 RX ADMIN — Medication 0.5 MILLIGRAM(S): at 13:51

## 2025-04-15 RX ADMIN — Medication 100 MILLIEQUIVALENT(S): at 12:54

## 2025-04-15 RX ADMIN — SEVELAMER HYDROCHLORIDE 800 MILLIGRAM(S): 800 TABLET ORAL at 17:09

## 2025-04-15 RX ADMIN — Medication 1 TABLET(S): at 13:39

## 2025-04-15 RX ADMIN — SEVELAMER HYDROCHLORIDE 800 MILLIGRAM(S): 800 TABLET ORAL at 13:38

## 2025-04-15 RX ADMIN — Medication 40 MILLIGRAM(S): at 05:10

## 2025-04-15 RX ADMIN — DULOXETINE 30 MILLIGRAM(S): 20 CAPSULE, DELAYED RELEASE ORAL at 11:49

## 2025-04-15 NOTE — PROVIDER CONTACT NOTE (MEDICATION) - ACTION/TREATMENT ORDERED:
As per Rachel, NP patient can receive potassium runs and then labs can be drawn after  medication is given.

## 2025-04-15 NOTE — PROGRESS NOTE ADULT - SUBJECTIVE AND OBJECTIVE BOX
CHIEF COMPLAINT: Patient is a 39y old  Male who presents with a chief complaint of sob (02 Mar 2025 06:19)      INTERVAL EVENTS: Plan for OR for peg removal  Heparin dc at 12m NPO     ROS: Seen by bedside during AM rounds     OBJECTIVE:  ICU Vital Signs Last 24 Hrs  T(C): 36.7 (15 Apr 2025 06:56), Max: 36.9 (14 Apr 2025 10:21)  T(F): 98.1 (15 Apr 2025 06:56), Max: 98.4 (14 Apr 2025 10:21)  HR: 107 (15 Apr 2025 06:56) (101 - 115)  BP: 134/80 (15 Apr 2025 06:56) (134/80 - 135/104)  BP(mean): 101 (14 Apr 2025 19:41) (101 - 115)  ABP: --  ABP(mean): --  RR: 15 (15 Apr 2025 06:56) (15 - 18)  SpO2: 97% (15 Apr 2025 06:56) (91% - 100%)    O2 Parameters below as of 14 Apr 2025 19:41  Patient On (Oxygen Delivery Method): nasal cannula  O2 Flow (L/min): 2            04-14 @ 07:01  -  04-15 @ 07:00  --------------------------------------------------------  IN: 0 mL / OUT: 1200 mL / NET: -1200 mL      CAPILLARY BLOOD GLUCOSE      POCT Blood Glucose.: 91 mg/dL (14 Apr 2025 21:35)      PHYSICAL EXAMINATION  General: Alert and cooperative. Sitting in recliner, resting comfortably. No apparent distress noted.   HEENT: Normocephalic/atraumatic.   Cardiovascular: Regular rhythm. + Sinus tachycardia    Respiratory: + NC. Breath sounds clear w/ diminished bases bilaterally without wheezing. No accessory muscles used.   Abdomen: + Central obesity. + PEG c/d/i. Soft, nontender, nondistended.   Skin: Warm to touch.   Extremities: + Full range of motion on upper extremities. Unable to move lower extremities. B/L lower extremities wrapped with gauze   Neuro: AxO x 3. Follow commands.         HOSPITAL MEDICATIONS:  MEDICATIONS  (STANDING):  amLODIPine   Tablet 10 milliGRAM(s) Oral daily  capsaicin HP 0.075% Cream 1 Application(s) Topical two times a day  caspofungin IVPB 70 milliGRAM(s) IV Intermittent every 24 hours  chlorhexidine 2% Cloths 1 Application(s) Topical <User Schedule>  clotrimazole 1% Cream 1 Application(s) Topical two times a day  Dakins Solution - 1/4 Strength 1 Application(s) Topical two times a day  dextrose 50% Injectable 25 Gram(s) IV Push once  dextrose 50% Injectable 12.5 Gram(s) IV Push once  dextrose 50% Injectable 25 Gram(s) IV Push once  DULoxetine 30 milliGRAM(s) Oral daily  gabapentin 900 milliGRAM(s) Oral every 8 hours  glucagon  Injectable 1 milliGRAM(s) IntraMuscular once  insulin lispro (ADMELOG) corrective regimen sliding scale   SubCutaneous three times a day before meals  insulin lispro (ADMELOG) corrective regimen sliding scale   SubCutaneous at bedtime  lidocaine   4% Patch 1 Patch Transdermal daily  lidocaine   4% Patch 1 Patch Transdermal daily  melatonin 3 milliGRAM(s) Oral at bedtime  multivitamin 1 Tablet(s) Oral daily  mupirocin 2% Ointment 1 Application(s) Topical two times a day  pantoprazole    Tablet 40 milliGRAM(s) Oral before breakfast  piperacillin/tazobactam IVPB.. 3.375 Gram(s) IV Intermittent every 8 hours  polyethylene glycol 3350 17 Gram(s) Oral daily  QUEtiapine 25 milliGRAM(s) Oral at bedtime  senna 2 Tablet(s) Oral at bedtime  sevelamer carbonate 800 milliGRAM(s) Oral three times a day with meals    MEDICATIONS  (PRN):  acetaminophen     Tablet .. 650 milliGRAM(s) Oral every 6 hours PRN Temp greater or equal to 38C (100.4F), Mild Pain (1 - 3), Moderate Pain (4 - 6)  albuterol/ipratropium for Nebulization 3 milliLiter(s) Nebulizer every 6 hours PRN Shortness of Breath and/or Wheezing  cyclobenzaprine 5 milliGRAM(s) Oral three times a day PRN Muscle Spasm  dextrose Oral Gel 15 Gram(s) Oral once PRN Blood Glucose LESS THAN 70 milliGRAM(s)/deciliter  HYDROmorphone  Injectable 0.5 milliGRAM(s) IV Push every 6 hours PRN dressing changes and severe breakthrough      LABS:                        8.0    9.55  )-----------( 491      ( 14 Apr 2025 05:50 )             23.4     04-14    141  |  98  |  11  ----------------------------<  93  3.5   |  32[H]  |  1.49[H]    Ca    9.5      14 Apr 2025 05:50  Phos  5.1     04-14  Mg     1.90     04-14      PT/INR - ( 14 Apr 2025 05:50 )   PT: 15.9 sec;   INR: 1.34 ratio         PTT - ( 14 Apr 2025 11:23 )  PTT:59.0 sec  Urinalysis Basic - ( 14 Apr 2025 05:50 )    Color: x / Appearance: x / SG: x / pH: x  Gluc: 93 mg/dL / Ketone: x  / Bili: x / Urobili: x   Blood: x / Protein: x / Nitrite: x   Leuk Esterase: x / RBC: x / WBC x   Sq Epi: x / Non Sq Epi: x / Bacteria: x         CHIEF COMPLAINT: Patient is a 39y old  Male who presents with a chief complaint of sob (02 Mar 2025 06:19)      INTERVAL EVENTS: Plan for OR for peg removal  Heparin dc at 12m NPO     ROS: Seen by bedside during AM rounds     OBJECTIVE:  ICU Vital Signs Last 24 Hrs  T(C): 36.7 (15 Apr 2025 06:56), Max: 36.9 (14 Apr 2025 10:21)  T(F): 98.1 (15 Apr 2025 06:56), Max: 98.4 (14 Apr 2025 10:21)  HR: 107 (15 Apr 2025 06:56) (101 - 115)  BP: 134/80 (15 Apr 2025 06:56) (134/80 - 135/104)  BP(mean): 101 (14 Apr 2025 19:41) (101 - 115)  ABP: --  ABP(mean): --  RR: 15 (15 Apr 2025 06:56) (15 - 18)  SpO2: 97% (15 Apr 2025 06:56) (91% - 100%)    O2 Parameters below as of 14 Apr 2025 19:41  Patient On (Oxygen Delivery Method): nasal cannula  O2 Flow (L/min): 2            04-14 @ 07:01  -  04-15 @ 07:00  --------------------------------------------------------  IN: 0 mL / OUT: 1200 mL / NET: -1200 mL      CAPILLARY BLOOD GLUCOSE      POCT Blood Glucose.: 91 mg/dL (14 Apr 2025 21:35)      PHYSICAL EXAMINATION  General:   HEENT:   Cardiovascular:   Respiratory:   Abdomen:   Skin:   Extremities:    Neuro:         HOSPITAL MEDICATIONS:  MEDICATIONS  (STANDING):  amLODIPine   Tablet 10 milliGRAM(s) Oral daily  capsaicin HP 0.075% Cream 1 Application(s) Topical two times a day  caspofungin IVPB 70 milliGRAM(s) IV Intermittent every 24 hours  chlorhexidine 2% Cloths 1 Application(s) Topical <User Schedule>  clotrimazole 1% Cream 1 Application(s) Topical two times a day  Dakins Solution - 1/4 Strength 1 Application(s) Topical two times a day  dextrose 50% Injectable 25 Gram(s) IV Push once  dextrose 50% Injectable 12.5 Gram(s) IV Push once  dextrose 50% Injectable 25 Gram(s) IV Push once  DULoxetine 30 milliGRAM(s) Oral daily  gabapentin 900 milliGRAM(s) Oral every 8 hours  glucagon  Injectable 1 milliGRAM(s) IntraMuscular once  insulin lispro (ADMELOG) corrective regimen sliding scale   SubCutaneous three times a day before meals  insulin lispro (ADMELOG) corrective regimen sliding scale   SubCutaneous at bedtime  lidocaine   4% Patch 1 Patch Transdermal daily  lidocaine   4% Patch 1 Patch Transdermal daily  melatonin 3 milliGRAM(s) Oral at bedtime  multivitamin 1 Tablet(s) Oral daily  mupirocin 2% Ointment 1 Application(s) Topical two times a day  pantoprazole    Tablet 40 milliGRAM(s) Oral before breakfast  piperacillin/tazobactam IVPB.. 3.375 Gram(s) IV Intermittent every 8 hours  polyethylene glycol 3350 17 Gram(s) Oral daily  QUEtiapine 25 milliGRAM(s) Oral at bedtime  senna 2 Tablet(s) Oral at bedtime  sevelamer carbonate 800 milliGRAM(s) Oral three times a day with meals    MEDICATIONS  (PRN):  acetaminophen     Tablet .. 650 milliGRAM(s) Oral every 6 hours PRN Temp greater or equal to 38C (100.4F), Mild Pain (1 - 3), Moderate Pain (4 - 6)  albuterol/ipratropium for Nebulization 3 milliLiter(s) Nebulizer every 6 hours PRN Shortness of Breath and/or Wheezing  cyclobenzaprine 5 milliGRAM(s) Oral three times a day PRN Muscle Spasm  dextrose Oral Gel 15 Gram(s) Oral once PRN Blood Glucose LESS THAN 70 milliGRAM(s)/deciliter  HYDROmorphone  Injectable 0.5 milliGRAM(s) IV Push every 6 hours PRN dressing changes and severe breakthrough      LABS:                        8.0    9.55  )-----------( 491      ( 14 Apr 2025 05:50 )             23.4     04-14    141  |  98  |  11  ----------------------------<  93  3.5   |  32[H]  |  1.49[H]    Ca    9.5      14 Apr 2025 05:50  Phos  5.1     04-14  Mg     1.90     04-14      PT/INR - ( 14 Apr 2025 05:50 )   PT: 15.9 sec;   INR: 1.34 ratio         PTT - ( 14 Apr 2025 11:23 )  PTT:59.0 sec  Urinalysis Basic - ( 14 Apr 2025 05:50 )    Color: x / Appearance: x / SG: x / pH: x  Gluc: 93 mg/dL / Ketone: x  / Bili: x / Urobili: x   Blood: x / Protein: x / Nitrite: x   Leuk Esterase: x / RBC: x / WBC x   Sq Epi: x / Non Sq Epi: x / Bacteria: x         CHIEF COMPLAINT: Patient is a 39y old  Male who presents with a chief complaint of sob (02 Mar 2025 06:19)      INTERVAL EVENTS: Plan for OR for peg removal  Heparin dc at 12m NPO     ROS: Seen by bedside during AM rounds     OBJECTIVE:  ICU Vital Signs Last 24 Hrs  T(C): 36.7 (15 Apr 2025 06:56), Max: 36.9 (14 Apr 2025 10:21)  T(F): 98.1 (15 Apr 2025 06:56), Max: 98.4 (14 Apr 2025 10:21)  HR: 107 (15 Apr 2025 06:56) (101 - 115)  BP: 134/80 (15 Apr 2025 06:56) (134/80 - 135/104)  BP(mean): 101 (14 Apr 2025 19:41) (101 - 115)  ABP: --  ABP(mean): --  RR: 15 (15 Apr 2025 06:56) (15 - 18)  SpO2: 97% (15 Apr 2025 06:56) (91% - 100%)    O2 Parameters below as of 14 Apr 2025 19:41  Patient On (Oxygen Delivery Method): nasal cannula  O2 Flow (L/min): 2            04-14 @ 07:01  -  04-15 @ 07:00  --------------------------------------------------------  IN: 0 mL / OUT: 1200 mL / NET: -1200 mL      CAPILLARY BLOOD GLUCOSE      POCT Blood Glucose.: 91 mg/dL (14 Apr 2025 21:35)      PHYSICAL EXAMINATION  General: NAD   HEENT:  MM moist conjunctiva clear   Cardiovascular:  S1S2 RR  Respiratory: Chest clear , no wheezing   Abdomen: Obese soft nt nd DSD over peg site   Skin:  Wm/dry  Extremities: B*ilat foot dressings D/I   Neuro:         HOSPITAL MEDICATIONS:  MEDICATIONS  (STANDING):  amLODIPine   Tablet 10 milliGRAM(s) Oral daily  capsaicin HP 0.075% Cream 1 Application(s) Topical two times a day  caspofungin IVPB 70 milliGRAM(s) IV Intermittent every 24 hours  chlorhexidine 2% Cloths 1 Application(s) Topical <User Schedule>  clotrimazole 1% Cream 1 Application(s) Topical two times a day  Dakins Solution - 1/4 Strength 1 Application(s) Topical two times a day  dextrose 50% Injectable 25 Gram(s) IV Push once  dextrose 50% Injectable 12.5 Gram(s) IV Push once  dextrose 50% Injectable 25 Gram(s) IV Push once  DULoxetine 30 milliGRAM(s) Oral daily  gabapentin 900 milliGRAM(s) Oral every 8 hours  glucagon  Injectable 1 milliGRAM(s) IntraMuscular once  insulin lispro (ADMELOG) corrective regimen sliding scale   SubCutaneous three times a day before meals  insulin lispro (ADMELOG) corrective regimen sliding scale   SubCutaneous at bedtime  lidocaine   4% Patch 1 Patch Transdermal daily  lidocaine   4% Patch 1 Patch Transdermal daily  melatonin 3 milliGRAM(s) Oral at bedtime  multivitamin 1 Tablet(s) Oral daily  mupirocin 2% Ointment 1 Application(s) Topical two times a day  pantoprazole    Tablet 40 milliGRAM(s) Oral before breakfast  piperacillin/tazobactam IVPB.. 3.375 Gram(s) IV Intermittent every 8 hours  polyethylene glycol 3350 17 Gram(s) Oral daily  QUEtiapine 25 milliGRAM(s) Oral at bedtime  senna 2 Tablet(s) Oral at bedtime  sevelamer carbonate 800 milliGRAM(s) Oral three times a day with meals    MEDICATIONS  (PRN):  acetaminophen     Tablet .. 650 milliGRAM(s) Oral every 6 hours PRN Temp greater or equal to 38C (100.4F), Mild Pain (1 - 3), Moderate Pain (4 - 6)  albuterol/ipratropium for Nebulization 3 milliLiter(s) Nebulizer every 6 hours PRN Shortness of Breath and/or Wheezing  cyclobenzaprine 5 milliGRAM(s) Oral three times a day PRN Muscle Spasm  dextrose Oral Gel 15 Gram(s) Oral once PRN Blood Glucose LESS THAN 70 milliGRAM(s)/deciliter  HYDROmorphone  Injectable 0.5 milliGRAM(s) IV Push every 6 hours PRN dressing changes and severe breakthrough      LABS:                        8.0    9.55  )-----------( 491      ( 14 Apr 2025 05:50 )             23.4     04-14    141  |  98  |  11  ----------------------------<  93  3.5   |  32[H]  |  1.49[H]    Ca    9.5      14 Apr 2025 05:50  Phos  5.1     04-14  Mg     1.90     04-14      PT/INR - ( 14 Apr 2025 05:50 )   PT: 15.9 sec;   INR: 1.34 ratio         PTT - ( 14 Apr 2025 11:23 )  PTT:59.0 sec  Urinalysis Basic - ( 14 Apr 2025 05:50 )    Color: x / Appearance: x / SG: x / pH: x  Gluc: 93 mg/dL / Ketone: x  / Bili: x / Urobili: x   Blood: x / Protein: x / Nitrite: x   Leuk Esterase: x / RBC: x / WBC x   Sq Epi: x / Non Sq Epi: x / Bacteria: x

## 2025-04-15 NOTE — PROVIDER CONTACT NOTE (MEDICATION) - ASSESSMENT
Problem: Wound:  Goal: Will show signs of wound healing; wound closure and no evidence of infection  Description: Will show signs of wound healing; wound closure and no evidence of infection  2/17/2021 1444 by Selvin Loera RN  Outcome: Ongoing  2/17/2021 1426 by Juwan Choudhury RN  Outcome: Ongoing     Problem: Falls - Risk of:  Goal: Will remain free from falls  Description: Will remain free from falls  2/17/2021 1444 by Selvin Loera RN  Outcome: Ongoing  2/17/2021 1426 by Juwan Choudhury RN  Outcome: Ongoing     Problem: Blood Glucose:  Goal: Ability to maintain appropriate glucose levels will improve  Description: Ability to maintain appropriate glucose levels will improve  2/17/2021 1444 by Selvin Loera RN  Outcome: Ongoing  2/17/2021 1426 by Juwan Choudhury RN  Outcome: Ongoing patient shows no signs of distress, sinus tach, and previous potassium 3.4 for morning labs

## 2025-04-15 NOTE — BRIEF OPERATIVE NOTE - OPERATION/FINDINGS
6 adelina proximal xlt, peg 5cm at the skin
Pt is now s/p EGD, PEG tube removal. Case was uncomplicated.
sacral wound debrided to healthy tissue

## 2025-04-15 NOTE — CHART NOTE - NSCHARTNOTEFT_GEN_A_CORE
POST-OP NOTE    [facility] | [id number] | J [id number] 643 A    Procedure: s/p sacral wound debridement    Subjective: Patient seen and examined at bedside.  Resting comfortably.  Denies any pain.  Debridement site with dressing place cdi.      Vital Signs Last 24 Hrs  T(C): 36.9 (15 Apr 2025 12:00), Max: 36.9 (15 Apr 2025 10:25)  T(F): 98.4 (15 Apr 2025 12:00), Max: 98.5 (15 Apr 2025 10:25)  HR: 114 (15 Apr 2025 12:15) (101 - 115)  BP: 146/88 (15 Apr 2025 12:15) (116/79 - 159/85)  BP(mean): 105 (15 Apr 2025 12:15) (90 - 109)  RR: 20 (15 Apr 2025 12:15) (15 - 24)  SpO2: 99% (15 Apr 2025 12:15) (89% - 107%)    Parameters below as of 15 Apr 2025 12:15  Patient On (Oxygen Delivery Method): nasal cannula  O2 Flow (L/min): 2    I&O's Summary    14 Apr 2025 07:01  -  15 Apr 2025 07:00  --------------------------------------------------------  IN: 0 mL / OUT: 2600 mL / NET: -2600 mL                            7.8    8.32  )-----------( 502      ( 15 Apr 2025 05:45 )             23.7     04-15    138  |  97[L]  |  10  ----------------------------<  82  3.4[L]   |  31  |  1.47[H]    Ca    9.5      15 Apr 2025 05:45  Phos  5.2     04-15  Mg     1.80     04-15     PT/INR - ( 15 Apr 2025 05:45 )   PT: 15.0 sec;   INR: 1.30 ratio         PTT - ( 15 Apr 2025 05:45 )  PTT:31.0 sec    PHYSICAL EXAM:  GEN: resting comfortably in bed, in NAD  SKIN: right gluteal fold to lower back on right side with dressing in place, cdi   NEURO: AAOx4, no focal neuro deficits; CN II-IX intact      39M with PMHx morbid obesity, BEA on CPAP, pAFIB (not on AC), HTN, and BL papilledema attributed to pseudotumor cerebri and complex hospitalization for MFPNA leading to AHRF requiring ECMO s/p decann, trach/PEG, with recurrent fevers, fungemia, now in RCU. Acute Care Surgery consulted for debridement/I&D evaluation of sacral decubitus wound which was performed on 4/15/25.      Plan:  - Dressing WTD with 1 kerlex, gauze, abd pads, and foam tape - change 1x per day  - Appreciate Wound care recs  - Rest of care per primary team     B Team, [id number]

## 2025-04-15 NOTE — PROGRESS NOTE ADULT - SUBJECTIVE AND OBJECTIVE BOX
SUBJECTIVE:  Patient seen and examined at bedside.  Denies pain.  Plan to go to OR today for PEG removal.  Sacral ulcer debridement postponed last night by anesthesia, plan to coordinate joint case with thoracic.    OBJECTIVE:  Vital Signs Last 24 Hrs  T(C): 36.9 (15 Apr 2025 12:00), Max: 36.9 (15 Apr 2025 10:25)  T(F): 98.4 (15 Apr 2025 12:00), Max: 98.5 (15 Apr 2025 10:25)  HR: 112 (15 Apr 2025 12:00) (101 - 115)  BP: 148/91 (15 Apr 2025 12:00) (116/79 - 159/85)  BP(mean): 107 (15 Apr 2025 12:00) (90 - 109)  RR: 19 (15 Apr 2025 12:00) (15 - 24)  SpO2: 99% (15 Apr 2025 12:00) (89% - 107%)    Parameters below as of 15 Apr 2025 12:00  Patient On (Oxygen Delivery Method): nasal cannula  O2 Flow (L/min): 2          Physical Examination:  GEN: resting comfortably in bed, in NAD  SKIN: gluteal folds with denuded epidermis; R gluteal fold/sacrum with decubitus wound probed to muscle, unable to feel bone, tunneling in superior direction medially and laterally, no feculent material, expression of purulent drainage   NEURO: AAOx4, no focal neuro deficits; CN II-IX intact

## 2025-04-15 NOTE — PACU DISCHARGE NOTE - COMMENTS
Yes No anesthesia complications.   Transfer back to RCU, requires telemetry and continuous pulse ox. Yes Yes Yes

## 2025-04-15 NOTE — CHART NOTE - NSCHARTNOTEFT_GEN_A_CORE
Patient s/p EGD, PEG removal. Patient resting in bed, offers no complaints.   PEG site with dressing clean and dry.  No signs of redness, swelling or bleeding noted.    Vital Signs Last 24 Hrs  T(C): 37 (15 Apr 2025 17:00), Max: 37 (15 Apr 2025 17:00)  T(F): 98.6 (15 Apr 2025 17:00), Max: 98.6 (15 Apr 2025 17:00)  HR: 112 (15 Apr 2025 17:00) (102 - 115)  BP: 125/75 (15 Apr 2025 17:00) (116/79 - 159/85)  BP(mean): 105 (15 Apr 2025 12:15) (90 - 109)  RR: 23 (15 Apr 2025 17:00) (15 - 24)  SpO2: 94% (15 Apr 2025 17:00) (89% - 107%)    Parameters below as of 15 Apr 2025 17:00  Patient On (Oxygen Delivery Method): nasal cannula  O2 Flow (L/min): 2  I&O's Detail    14 Apr 2025 07:01  -  15 Apr 2025 07:00  --------------------------------------------------------  IN:  Total IN: 0 mL    OUT:    Voided (mL): 2600 mL  Total OUT: 2600 mL    Total NET: -2600 mL      15 Apr 2025 07:01  -  15 Apr 2025 19:38  --------------------------------------------------------  IN:    IV PiggyBack: 400 mL  Total IN: 400 mL    OUT:    Voided (mL): 1100 mL  Total OUT: 1100 mL    Total NET: -700 mL    MEDICATIONS  (STANDING):  amLODIPine   Tablet 10 milliGRAM(s) Oral daily  capsaicin HP 0.075% Cream 1 Application(s) Topical two times a day  chlorhexidine 2% Cloths 1 Application(s) Topical <User Schedule>  clotrimazole 1% Cream 1 Application(s) Topical two times a day  Dakins Solution - 1/4 Strength 1 Application(s) Topical two times a day  dextrose 50% Injectable 25 Gram(s) IV Push once  dextrose 50% Injectable 12.5 Gram(s) IV Push once  dextrose 50% Injectable 25 Gram(s) IV Push once  DULoxetine 30 milliGRAM(s) Oral daily  gabapentin 900 milliGRAM(s) Oral every 8 hours  glucagon  Injectable 1 milliGRAM(s) IntraMuscular once  insulin lispro (ADMELOG) corrective regimen sliding scale   SubCutaneous three times a day before meals  insulin lispro (ADMELOG) corrective regimen sliding scale   SubCutaneous at bedtime  lidocaine   4% Patch 1 Patch Transdermal daily  lidocaine   4% Patch 1 Patch Transdermal daily  melatonin 3 milliGRAM(s) Oral at bedtime  multivitamin 1 Tablet(s) Oral daily  mupirocin 2% Ointment 1 Application(s) Topical two times a day  pantoprazole    Tablet 40 milliGRAM(s) Oral before breakfast  piperacillin/tazobactam IVPB.. 3.375 Gram(s) IV Intermittent every 8 hours  polyethylene glycol 3350 17 Gram(s) Oral daily  QUEtiapine 25 milliGRAM(s) Oral at bedtime  senna 2 Tablet(s) Oral at bedtime  sevelamer carbonate 800 milliGRAM(s) Oral three times a day with meals    A/P: S/P EGD, PEG removal   Recommend NPO for 24 hours  Continue care as per primary team

## 2025-04-15 NOTE — CHART NOTE - NSCHARTNOTEFT_GEN_A_CORE
Pt is now s/p EGD, PEG tube removal. Case was uncomplicated. Recommend NPO for 24 hours.    Jung Greenwood, PGY-3  Thoracic Surgery  [id number]

## 2025-04-15 NOTE — BRIEF OPERATIVE NOTE - NSICDXBRIEFPREOP_GEN_ALL_CORE_FT
PRE-OP DIAGNOSIS:  Sacral wound 16-Apr-2025 10:11:29  J Carlos Abreu  
PRE-OP DIAGNOSIS:  Acute respiratory failure with hypoxia 08-Mar-2025 08:39:09  Gatito COVARRUBIAS  
PRE-OP DIAGNOSIS:  Presence of externally removable percutaneous endoscopic gastrostomy (PEG) tube 15-Apr-2025 09:53:47  Jung Greenwood

## 2025-04-15 NOTE — PROGRESS NOTE ADULT - ASSESSMENT
This is a 40 y/o M w/ PMHx AF (not on AC), HTN, BEA, pseudotumor cerebri s/p LP in 2024, initially presented to [place] on 2/24, SOB, LE edema with URI symptoms and sick contacts, noted to have severe ARDS, intubated however still hypoxia, cannulated for VV ECMO on 2/25, transferred to [facility] on 2/25, started on empiric Vanco, Zosyn for multifocal PNA, abdominal wall cellulitis, s/p trach placement by CT surgery on 3/7, ECMO decannulated on 3/7. Zosyn held on 3/9.  C/b fevers on 3/10, restarted on Zosyn, transferred to RCU on 3/13, Bcx now w/ yeast.    #Sacral ulcer, with extensive tunneling, no OM on MRI, however c/f OM on WBC scan  #Candida albicans fungemia   #Fevers   #Multifocal PNA, abdominal wall cellulitis s/p Vanco/Zosyn  #ARDS, hypoxic respiratory failure requiring VV EMCO 2/2 multifocal PNA? s/p decannulation on 3/7    Overall, 40 y/o M w/ PMHx AF (not on AC), HTN, BEA, pseudotumor cerebri s/p LP in 2024 admitted to [facility] on 2/26 for ARDS, hypoxic respiratory failure requiring VV EMCO 2/2 multifocal PNA? s/p decannulation on 3/7, c/b abdominal wall cellulitis s/p Vancomycin/Zosyn, s/p trach on 3/7, in the setting of persistent fevers despite Zosyn, BCx now w/ yeast, Candida albicans   Unclear source for yeast in BCx, pt had all central lines removed on 3/7, not getting TPN, no abdominal pain on exam, PEG site well appearing.     CT A/P w/o clear abdominal source of fungemia, possibly 2/2 abdominal wall cellulitis? TTE w/o IE.  BCx 3/18 1/2 positive, 3/20 NGTD x 2.     Pt with worsening sepsis on 3/30, febrile to 103, WBC 16.   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   Pt with deep tunneled wound to left, no drainage, malodor per wound care.   CT A/P w/ b/l pulmonary opacities but improved, no sacral abscess   WBC scan with soft tissue infection R side of pelvis, intense activity in R iliac bone possible OM. Lateral R forefoot with soft tissue infection, possible OM?  Wound examined with wound care, large R buttocks soft tissue ulcer, does not look infected, however deep tunneling with murky fluid, likely source of infection   MRI w/o OM, however given WBC scan findings, will still treat for 6 weeks     S/p PEG removal and sacral ulcer debridement, gram stain w/ GNR    Recommendations;   1. Continue with Zosyn 3.375 g 8, plan on 6 week course for possible OM until 5/11  2. Caspofungin 70 mg daily, will plan on 4 week course ending today 4/15  3. F/u OR studies     Thank you for consulting us and involving us in the management of this patient's case. In addition to reviewing history, imaging, documents, labs, microbiology, and infection control strategies and potential issues.     ID will continue to follow    Shailesh Owens M.D.  Attending Physician  Division of Infectious Diseases  Department of Medicine    Please contact through MS Teams message.  Office: [phone] (after 5 PM or weekend)

## 2025-04-15 NOTE — PROGRESS NOTE ADULT - ASSESSMENT
39M with PMHx morbid obesity, BEA on CPAP, pAFIB (not on AC), HTN, and BL papilledema attributed to pseudotumor cerebri and complex hospitalization for MFPNA leading to AHRF requiring ECMO s/p decann, trach/PEG, with recurrent fevers, fungemia, now in RCU. Acute Care Surgery consulted for debridement/I&D evaluation of sacral decubitus wound.     Plan:  - sacral wound debridement and I&D today, tentative plan for joint case with thoracic  - NPO with meds  - Hold hep gtt on call to OR   - medical and respiratory clearance for operative intervention   - Thoracic Surgery welcome to d/c PEG intra-op, can coordinate   - Appreciate Wound care recs  - Rest of care per primary team     B Team, [id number]

## 2025-04-15 NOTE — PROGRESS NOTE ADULT - SUBJECTIVE AND OBJECTIVE BOX
Infectious Diseases Follow Up:    Patient is a 39y old  Male who presents with a chief complaint of sob (02 Mar 2025 06:19)      Interval History/ROS:  Pt s/p OR for PEG removal and sacral wound debrided.   Pt doing well post op     Allergies  No Known Allergies        ANTIMICROBIALS:  caspofungin IVPB 70 every 24 hours  piperacillin/tazobactam IVPB.. 3.375 every 8 hours      Current Abx:     Previous Abx     OTHER MEDS:  MEDICATIONS  (STANDING):  acetaminophen     Tablet .. 650 every 6 hours PRN  albuterol/ipratropium for Nebulization 3 every 6 hours PRN  amLODIPine   Tablet 10 daily  cyclobenzaprine 5 three times a day PRN  dextrose 50% Injectable 25 once  dextrose 50% Injectable 12.5 once  dextrose 50% Injectable 25 once  dextrose Oral Gel 15 once PRN  DULoxetine 30 daily  gabapentin 900 every 8 hours  glucagon  Injectable 1 once  HYDROmorphone  Injectable 0.5 every 6 hours PRN  HYDROmorphone  Injectable 0.5 every 10 minutes PRN  insulin lispro (ADMELOG) corrective regimen sliding scale  at bedtime  insulin lispro (ADMELOG) corrective regimen sliding scale  three times a day before meals  melatonin 3 at bedtime  ondansetron Injectable 4 once PRN  pantoprazole    Tablet 40 before breakfast  polyethylene glycol 3350 17 daily  QUEtiapine 25 at bedtime  senna 2 at bedtime      Vital Signs Last 24 Hrs  T(C): 36.9 (15 Apr 2025 12:00), Max: 36.9 (15 Apr 2025 10:25)  T(F): 98.4 (15 Apr 2025 12:00), Max: 98.5 (15 Apr 2025 10:25)  HR: 114 (15 Apr 2025 12:15) (101 - 115)  BP: 146/88 (15 Apr 2025 12:15) (116/79 - 159/85)  BP(mean): 105 (15 Apr 2025 12:15) (90 - 109)  RR: 20 (15 Apr 2025 12:15) (15 - 24)  SpO2: 99% (15 Apr 2025 12:15) (89% - 107%)    Parameters below as of 15 Apr 2025 12:15  Patient On (Oxygen Delivery Method): nasal cannula  O2 Flow (L/min): 2      PHYSICAL EXAM:  GENERAL: NAD  HEAD:  Atraumatic, Normocephalic  EYES: EOMI, conjunctiva and sclera clear  CHEST/LUNG: On NC, CTAB  HEART: RRR  ABDOMEN: Soft, Nontender, Nondistended  Extremities: B/l feet wrapped   PSYCH: AAOx3                              7.8    8.32  )-----------( 502      ( 15 Apr 2025 05:45 )             23.7       04-15    138  |  97[L]  |  10  ----------------------------<  82  3.4[L]   |  31  |  1.47[H]    Ca    9.5      15 Apr 2025 05:45  Phos  5.2     04-15  Mg     1.80     04-15        Urinalysis Basic - ( 15 Apr 2025 05:45 )    Color: x / Appearance: x / SG: x / pH: x  Gluc: 82 mg/dL / Ketone: x  / Bili: x / Urobili: x   Blood: x / Protein: x / Nitrite: x   Leuk Esterase: x / RBC: x / WBC x   Sq Epi: x / Non Sq Epi: x / Bacteria: x        MICROBIOLOGY:  v  Tissue SACRAL WOUND AEBNDEMENT  04-15-25 --  --    Rare polymorphonuclear leukocytes per low power field  Rare Gram Negative Rods per oil power field      Blood Blood-Venous  04-07-25   No growth at 5 days  --  --      Blood Blood-Peripheral  04-07-25   No growth at 5 days  --  --      Clean Catch Clean Catch (Midstream)  03-31-25   <10,000 CFU/mL Normal Urogenital Kyle  --  --      Blood Blood-Peripheral  03-30-25   No growth at 5 days  --  --      Trach Asp Tracheal Aspirate  03-21-25   Commensal kyle consistent with body site  --    Few polymorphonuclear leukocytes per low power field  No Squamous epithelial cells per low power field  No organisms seen per oil power field      Blood Blood-Peripheral  03-20-25   No growth at 5 days  --  --      Blood Blood-Venous  03-20-25   No growth at 5 days  --  --      Blood Blood-Peripheral  03-18-25   No growth at 5 days  --  Blood Culture PCR  Candida albicans      Nares/Axilla/Groin Nares/Axilla/Groin  03-17-25   Culture NEGATIVE for Candida auris.  This surveillance culture is intended for Infection Control purposes only.  A negative result does not preclude the carriage of other fungal  organisms.  --  --      Blood Blood-Venous  03-16-25   Growth in aerobic bottle: Candida albicans  See previous culture [id number]  --    Growth in aerobic bottle: Yeast like cells      Blood Blood-Peripheral  03-16-25   Growth in aerobic bottle: Candida albicans  See previous culture [id number]  --    Growth in aerobic bottle: Yeast like cells                RADIOLOGY:

## 2025-04-15 NOTE — BRIEF OPERATIVE NOTE - NSICDXBRIEFPOSTOP_GEN_ALL_CORE_FT
POST-OP DIAGNOSIS:  Sacral wound 16-Apr-2025 10:11:34  J Carlos Abreu  
POST-OP DIAGNOSIS:  Presence of externally removable percutaneous endoscopic gastrostomy (PEG) tube 15-Apr-2025 09:54:01  Jung Greenwood  
POST-OP DIAGNOSIS:  Acute respiratory failure with hypoxia 08-Mar-2025 08:39:21  Gatito COVARRUBIAS

## 2025-04-15 NOTE — PROGRESS NOTE ADULT - NS ATTEND AMEND GEN_ALL_CORE FT
38YO Male with obesity (Class III, BMI 69), pAfib (no AC), HTN, BEA (2019 AHI 34 non-compliant on CPAP [id number]), and history of b/l papilledema attributed to pseudotumor cerebri initially presenting as a transfer [facility] on 2/26 for acute hypoxemic respiratory failure s/p ETT intubation/MV with progression to refractory ARDS s/p initiation of vv-ECMO support (2/26-3/7) with failure to wean from ventilator s/p tracheostomy placement, further found to have RV failure s/p aggressive diuresis and PNA followed by severe sepsis 2/2 panniculitis c/b candida albicans fungemia now transferred to RCU for further medical management.     #Acute hypoxemic and hypercapnic respiratory failure s/p tracheostomy placement   - Decannulated 3/28, continues to use Bilevel NIV qhs    #RV failure 2/2 pulmHTN   - Maintaining euvolemia     #AFib - rate controlled     #DVT right IJ and bilateral peroneal veins - AC for DVT and AFib, held for surgery, will resume per surgery     #Plan for removal of PEG   - Last dose of Eliquis 6am 4/12, currently on Heparin drip    #Transaminitis improving  -Hepatomegaly will need to follow as outpatient    #Fungemia  #Sacral ulceration with induration  - NM WBC scan performed overnight concerning for active infection with possible osteomyelitis of right pelvis/iliac bone.   Evaluated by wound care team again at bedside 4/10,   Pt will need long-term Abx ~4 weeks through 5/11 and completion of caspofungin 4/15. Surgery consulted  - patient in OR today for wound debridement     Neuropathic pain  Evaluated by chronic pain service last week as patient continues to have uncontrolled pain in the LE - Gabapentin being uptitrated , Flexeril and Cymbalta. Dilaudid for dressing changes. 38YO Male with obesity (Class III, BMI 69), pAfib (no AC), HTN, BEA (2019 AHI 34 non-compliant on CPAP [id number]), and history of b/l papilledema attributed to pseudotumor cerebri initially presenting as a transfer [facility] on 2/26 for acute hypoxemic respiratory failure s/p ETT intubation/MV with progression to refractory ARDS s/p initiation of vv-ECMO support (2/26-3/7) with failure to wean from ventilator s/p tracheostomy placement, further found to have RV failure s/p aggressive diuresis and PNA followed by severe sepsis 2/2 panniculitis c/b candida albicans fungemia now transferred to RCU for further medical management.     #Acute hypoxemic and hypercapnic respiratory failure s/p tracheostomy placement   - Decannulated 3/28, continues to use Bilevel NIV qhs    #RV failure 2/2 pulmHTN   - Maintaining euvolemia     #AFib - rate controlled     #DVT right IJ and bilateral peroneal veins - AC for DVT and AFib, held for surgery, will resume per surgery     #Transaminitis improving  -Hepatomegaly will need to follow as outpatient    #Fungemia  #Sacral ulceration with induration  - NM WBC scan performed overnight concerning for active infection with possible osteomyelitis of right pelvis/iliac bone.   Evaluated by wound care team again at bedside 4/10,   Pt will need long-term Abx, on Zosyn per ID. Pt on caspofungin 4/15. Surgery consulted  - patient in OR today for wound debridement     Neuropathic pain  Evaluated by chronic pain service last week as patient continues to have uncontrolled pain in the LE - Gabapentin being uptitrated , Flexeril and Cymbalta. Dilaudid for dressing changes.

## 2025-04-15 NOTE — PROGRESS NOTE ADULT - ASSESSMENT
38 YO M with PMHx of morbid obesity, BEA on CPAP, pAFIB (not on AC), HTN, and BL papilledema attributed to pseudotumor cerebri who presented initially to [facility] on 2/24 with SOB and BL LE edema. Found with URI outpatient with progressive SOB, and concern for noted for MFPNA on imaging. Course progressed with AHRF with worsening O2 demand, respiratory distress, secretions, and somnolence requiring intubation (difficult with success after 6 attempts) in ED and admission to [facility] MICU. While in MICU, patient noted with increasing O2 demand with no improvement despite optimal vent management. Concern noted for progressive ARDS, unable to prone given morbid obesity, and ultimately cannulated for vvECMO on 2/25 and transferred to [facility] for further management on 2/26. While at [facility], tx with diuresis and ABX, and ultimately decannulated and s/p 60XLTCP trach and PEG on 3/7. Patient ultimately transferred to [facility]CU on 3/11 and course complicated by recurrent high grade fevers and found with fungemia.    NEUROLOGY  # Hx of Pseudotumor Cerebri   - s/p LP in 2024 with high opening pressure  - s/p MRI 2024 with possible pituitary mass but unclear because of pressure effect from pseudotumor cerebri causing partially empty sella.  - Prolactin elevated   - ACTH low but received steroids during admission   - FT4 low normal and TSH normal, but given FT4 low normal TSH should be higher   - Discuss endocrine consult for inhouse work up vs outpatient.     # Sedation   - Weaned off sedation in ICU   - Nimbex turned off 2/27   - Prop turned off 3/9   - Precedex turned off and catapres started 3/11  - catapres 0.2 TID (decreased 3/25) - tapered down 3/31 to 0.1 tid - discontinued 4/3     # Insomnia   - Patient worked night shift x 15 years   - Trouble sleeping at night leading to day time sleepiness and poor participation with PT  - Continue on Seroquel 25 QHS (increased 3/18)   - Continue on melatonin   - Continue on Neurontin as below    # BL LE neuropathy   - CIPN concern   - Neurontin increased to 300mg Q8H with relief   - added Percocet Q4H for moderate pain (3/25) and continue on Dilaudid Q6H for severe pain   - Pain mgt called to consult - recs for tylenol ATC, NEurontin increased to 400mg q6, Oxycodone 5 q4h prn , Dilaudid for BREAKTHROUGH pain only, Lidocaine patches on each leg, ice prn to feet  - Some improvement in pain - PT consult for TENS,   - oxycodone inc to 10mg as pt with no relief /buttocks area indurated /tender CT ordered -no abscess  - C/w Gabapentin : 600/600/800 and up titrate as required  - Switched to PO Dilaudid 2mg Q6H PRN moderate-severe and IV Dilaudid PRN severe breakthrough and with dressing changes   - Still with severe pain 10/10 despite change to Dilaudid will increase to 4mg q4h prn with iv Dilaudid breakthrough  - Neurontin increased to 800mg q8h Monitor neuro status/ lethargy   - I have reviewed with pt to not wait to long for dose of pain medication to  prevent pain from escalating   - Meds reviewed despite being in constant pain pt not taking Dilaudid as often as he can often with 10/10 pain Dilaudid will be every 4 hours with parameters and pt can refuse Will reconsult pain mgt again for any recommendations on Monday   - Pain management reconsulted , recs to stop Dilaudid PO ATC , continue Dilaudid for breakthrough pain with dressing changes, increase gabapentin to 900 mg Q8, add Cymbalta 30 mg QD and Flexeril 5mg Q8  - Neurontin inc to 900 with some decrease in pain but still severe   - Will do trial of capsaicin bid with strict instructions to avoid open /broken irritated skin/ and bandaged areas     CARDIOVASCULAR  # HTN   - HTN noted in ICU requiring cardene and esmolol GTTs   - Lisinopril dc'ed to increase catapres   - Continue on norvasc 10, catapres weaned off 4/3  - Monitor BP     # AFIB   - Hx of pAFIB   - No RVR episodes or pAFIB episodes in ICU   - No rate control medications needed  - Monitor on telemetry     # RV dilation second to PHTN   - POCUS on arrival to [facility] with RVE concern   - TTE 10/2024 with EF 55-60 with normal LVSF, moderate LVH and normal RVSF and size with no concern for pHTN   - TTE on 2/25 at  with EF 61 and normal LVSF, but enlarged RV size with reduced RVSF, mild TR, mild PHTN with PASP 49, and dilated IVC to 3.4cn, but normal TAPSE 2.1.  - Continue on aggressive diuresis in ICU    - TTE 3/11 with RVSF reduced with TAPSE 1.6. IVC improved to 2.12cm.   - Lasix 40 PO QD discontinued 4/7 secondary to ELLA  - Monitor IO/ BMP    RESPIRATORY  # ARDS second to MFPNA   - Hx of BEA on CPAP presented to  post URI with SOB and found with AHRF with progression to ARDS second to post viral MFPNA   - Intubated 2/25   - Cannulated for vvECMO 2/26   - s/p 60XLTCP tracheostomy 3/7   - Decannulated 3/7   - CTSx removed tracheostomy and ECMO sutures on 3/17   - Continue on TC QD with PMV as able with strong phonation  - Continue on nebs and HTS   -  trials continue and now trialing overnight with BIPAP for OHS (10/8/12)  - Continue on PS 18/10/40 QHS given OHS   - Decannulated 3/28   - c/w nasal cannula 2L     GI  # Dysphagia   - s/p PEG 3/7   - Attempted green dye on 3/13 and failed  - Continue on TF  - Continue on miralax and senna  - RPT green dye passed 3/19  - 3/20 Passed FEEST - on soft bite sized with thin liquids Added consistent carb 2/2 weight/Elevated A1c   - plan for OR 4/15 for PEG removal with cardiothoracic surgery     # Mild transaminitis   - LFTs elevated in ICU with TBILI elevated likely from ECMO hemolysis   - US ABD with hepatic steatosis   - Trend LFTs      RENAL  # ELLA   - ELLA likely from cardiorenal with RVE   - Diuresed in ICU and improved   - Monitor renal function and UOP   - Recurrent ELLA noted suspected in s/o vancomycin toxicity (4/7)   - UA negative and Urine Studies FeNa 0.9 % suggest pre-renal etiology  - Lasix discontinued for now  - s/p IV fluid trial on 4/8 with minimal improvement  - Trialing additional fluids on 4/10   - Trend BMP and I/Os    # Hypernatremia (resolved)  - Hypernatremia with fever spikes   - Fever curve improved and attempting to wean FWF   - now off FWF    INFECTIOUS DISEASE  # Recurrent fevers with fungemia from unknown source   - Recurrent fevers post ECMO decannulation thought initially to be cytokine surge   - BCx, SCx, UCx, RVP and MRSA RPT on 3/12 negative   - Completed zosyn (3/12-3/18) empirically with improved WBC , however recurrent fevers noted.   - RPT SCx, UA, CXR and RVP negative  - TTE 3/17 with no IE  - PanCT 3/17 with PNA and persistent panniculitis  - BCx 3/15 and 3/16 with candida albicans, rpt BCx 3/18 and 3/20 negative   - Concern for possible source from panniculitis   - Continue on Caspofungin 70 mg daily 3/17 - 4/15, ID consult appreciated   - 3/30 spiked temp cx's sent + ua and c/s pending Bcx neg to date - started on vanco /zosyn ID following  - Vanco stopped (4/7) due to ELLA concern for Vanco toxicty and continued on empiric Zosyn/ Caspofungin   - 4/3 - persistent fevers, leukocytosis. Will repeat CT C/A/P to evaluate for source - no source found - Bilat lung opacities mildly decreased     - 4/8 bone scan: : "1. Large area of soft tissue infection in the R side of the pelvis as detailed above. Slightly more intense activity in the R iliac bone compared to the L raises possibility of OM. 2. Intense activity in the lateral aspect of the R forefoot compatible with soft tissue infection; complicating osteomyelitis is not excluded. Milder activity in the medial aspect of the L forefoot may reflect soft tissue infection."  - ID consult appreciated. Continue on Zosyn 3.375 g 8, plan on 6 week course for possible OM until 5/11 and Caspofungin 70 mg daily, will plan on 4 week course ending 4/15  - MR Pelvis Bony (4/11): No osteomyelitis. Sacral decubitus wound is again seen extending superiorly along the superficial margin of the right gluteus zurdo to the level of L5 with small amount of fluid and gas within the tract. The underlying gluteus zurdo demonstrates a region of decreased enhancement consistent with ischemia or necrosis measuring up to 11.9 cm transversely. Muscle edema suggests a nonspecific myositis. Small bilateral hip joint effusions with mild synovitis is nonspecific.  - Surgery c/s (4/14) - plan for operative sacral wound debridement and I&D today (4/14)    # MFPNA and abdominal pannus cellulitis   - Presented to  post URI with SOB and found with AHRF with progression to ARDS second to post viral MFPNA   - RVP, legionella, strept, BAL, BCx, UCx, HIV, PCP, and adenovirus PCR negative   - Initially started on multiple agents in ICU and ultimately completed zosyn (2/26-3/9) ZMAX (2/25-2/26) and vanco (2/26-3/1)     # R/o Sacral OM  - Noted with hard indurated sacral wound by Wound Care Team  - CT Pelvis (3/29) revealing superficial skin thickening overlying the sacrum with underlying edema of the subcutaneous fat, in keeping with the clinical history of sacral wound. No evidence of underlying tract or drainable fluid collection.  - CT 4/3 - no fluid collection in sacral tissue   - Given ongoing fever with no clear source with obtain MRI Pelvis per ID recs to r/o sacral OM once renal fx improves    # Penile discharge   - GC/ chlamydia negative   - HIV negative   - Monitor for now    # PPX   - MRSA PCR positive and completed bactroban (2/26-3/3)     HEME   # Anemia likely second to ECMO circuit vs AOCD   - HH low in ICU second to ECMO circuit   - HH dropping again today however no bleeding noted   - Microcytic and hemochromic, sent anemia panel 3/19  - Trend HH     VASCULAR   # R IJ and BL LE DVTs   - Post ECMO dopplers on 3/9 negative for BL UE/ LE DVTs.   - Subsequent post ECMO dopplers performed on 3/14 and noted with acute, non-occlusive deep vein thrombosis noted within the right internal jugular vein. Acute, occlusive deep vein thromboses noted within the right and left peroneal veins at both mid calves, and age-indeterminate, occlusive deep vein thrombosis visualized within the left gastrocnemius vein at the proximal calf.     - Continue on argatroban GTT and changed to Eliquis   - on heparin gtt in preparation for PEG removal (4/15) -  hold hep gtt at 12am tonight    PODIATRY   # BL plantar feet blisters likely pressors induced  - Seen by podiatry and blisters lanced on 3/4 and again on 3/18  - Continue on local wound care per podiatry   - Podiatry following  - OK for WBAT will fu with PT for offloading shoe if appropriate  - Pain mgt consult   - Podiatry FU 3/28 / 4/3 done /following     ENDOCRINE  # Pre-DM2   - A1C 6.7  - Continue on ISS   - Monitor FS   - IF no demand then stop ISS     LINES/ TUBES  - R IJ and R FEM ECMO (2/26-3/7)     SKIN  # Pannus canndial intertrigo   # Sacrum/ buttock MAD  - Continue on clotrimazole cream   - WOC appreciated   -Seen by WC pt noted to have oozing from Buttock wound surgicel placed by wound care On follow up no further bleeding  - Wound care placed orders   - No bleeding from wound     ETHICS/ GOC    - FULL CODE     DISPO - PT following  Seen by PMR for acute rehab per recs   38 YO M with PMHx of morbid obesity, BEA on CPAP, pAFIB (not on AC), HTN, and BL papilledema attributed to pseudotumor cerebri who presented initially to [facility] on 2/24 with SOB and BL LE edema. Found with URI outpatient with progressive SOB, and concern for noted for MFPNA on imaging. Course progressed with AHRF with worsening O2 demand, respiratory distress, secretions, and somnolence requiring intubation (difficult with success after 6 attempts) in ED and admission to [facility] MICU. While in MICU, patient noted with increasing O2 demand with no improvement despite optimal vent management. Concern noted for progressive ARDS, unable to prone given morbid obesity, and ultimately cannulated for vvECMO on 2/25 and transferred to [facility] for further management on 2/26. While at [facility], tx with diuresis and ABX, and ultimately decannulated and s/p 60XLTCP trach and PEG on 3/7. Patient ultimately transferred to [facility] on 3/11 and course complicated by recurrent high grade fevers and found with fungemia.    NEUROLOGY  # Hx of Pseudotumor Cerebri   - s/p LP in 2024 with high opening pressure  - s/p MRI 2024 with possible pituitary mass but unclear because of pressure effect from pseudotumor cerebri causing partially empty sella.  - Prolactin elevated   - ACTH low but received steroids during admission   - FT4 low normal and TSH normal, but given FT4 low normal TSH should be higher   - Discuss endocrine consult for inhouse work up vs outpatient.     # Sedation   - Weaned off sedation in ICU   - Nimbex turned off 2/27   - Prop turned off 3/9   - Precedex turned off and catapres started 3/11  - catapres 0.2 TID (decreased 3/25) - tapered down 3/31 to 0.1 tid - discontinued 4/3     # Insomnia   - Patient worked night shift x 15 years   - Trouble sleeping at night leading to day time sleepiness and poor participation with PT  - Continue on Seroquel 25 QHS (increased 3/18)   - Continue on melatonin   - Continue on Neurontin as below    # BL LE neuropathy   - CIPN concern   - Neurontin increased to 300mg Q8H with relief   - added Percocet Q4H for moderate pain (3/25) and continue on Dilaudid Q6H for severe pain   - Pain mgt called to consult - recs for tylenol ATC, NEurontin increased to 400mg q6, Oxycodone 5 q4h prn , Dilaudid for BREAKTHROUGH pain only, Lidocaine patches on each leg, ice prn to feet  - Some improvement in pain - PT consult for TENS,   - oxycodone inc to 10mg as pt with no relief /buttocks area indurated /tender CT ordered -no abscess  - C/w Gabapentin : 600/600/800 and up titrate as required  - Switched to PO Dilaudid 2mg Q6H PRN moderate-severe and IV Dilaudid PRN severe breakthrough and with dressing changes   - Still with severe pain 10/10 despite change to Dilaudid will increase to 4mg q4h prn with iv Dilaudid breakthrough  - Neurontin increased to 800mg q8h Monitor neuro status/ lethargy   - I have reviewed with pt to not wait to long for dose of pain medication to  prevent pain from escalating   - Meds reviewed despite being in constant pain pt not taking Dilaudid as often as he can often with 10/10 pain Dilaudid will be every 4 hours with parameters and pt can refuse Will reconsult pain mgt again for any recommendations on Monday   - Pain management reconsulted , recs to stop Dilaudid PO ATC , continue Dilaudid for breakthrough pain with dressing changes, increase gabapentin to 900 mg Q8, add Cymbalta 30 mg QD and Flexeril 5mg Q8  - Neurontin inc to 900 with some decrease in pain but still severe   - Will do trial of capsaicin bid with strict instructions to avoid open /broken irritated skin/ and bandaged areas     CARDIOVASCULAR  # HTN   - HTN noted in ICU requiring cardene and esmolol GTTs   - Lisinopril dc'ed to increase catapres   - Continue on norvasc 10, catapres weaned off 4/3  - Monitor BP     # AFIB   - Hx of pAFIB   - No RVR episodes or pAFIB episodes in ICU   - No rate control medications needed  - Monitor on telemetry     # RV dilation second to PHTN   - POCUS on arrival to [facility] with RVE concern   - TTE 10/2024 with EF 55-60 with normal LVSF, moderate LVH and normal RVSF and size with no concern for pHTN   - TTE on 2/25 at  with EF 61 and normal LVSF, but enlarged RV size with reduced RVSF, mild TR, mild PHTN with PASP 49, and dilated IVC to 3.4cn, but normal TAPSE 2.1.  - Continue on aggressive diuresis in ICU    - TTE 3/11 with RVSF reduced with TAPSE 1.6. IVC improved to 2.12cm.   - Lasix 40 PO QD discontinued 4/7 secondary to ELLA  - Monitor IO/ BMP    RESPIRATORY  # ARDS second to MFPNA   - Hx of BEA on CPAP presented to  post URI with SOB and found with AHRF with progression to ARDS second to post viral MFPNA   - Intubated 2/25   - Cannulated for vvECMO 2/26   - s/p 60XLTCP tracheostomy 3/7   - Decannulated 3/7   - CTSx removed tracheostomy and ECMO sutures on 3/17   - Continue on TC QD with PMV as able with strong phonation  - Continue on nebs and HTS   -  trials continue and now trialing overnight with BIPAP for OHS (10/8/12)  - Continue on PS 18/10/40 QHS given OHS   - Decannulated 3/28   - c/w nasal cannula 2L     GI  # Dysphagia   - s/p PEG 3/7   - Attempted green dye on 3/13 and failed  - Continue on TF  - Continue on miralax and senna  - RPT green dye passed 3/19  - 3/20 Passed FEEST - on soft bite sized with thin liquids Added consistent carb 2/2 weight/Elevated A1c   - plan for OR 4/15 for PEG removal with cardiothoracic surgery   - PEG removed in OR DSD over site     # Mild transaminitis   - LFTs elevated in ICU with TBILI elevated likely from ECMO hemolysis   - US ABD with hepatic steatosis   - Trend LFTs      RENAL  # ELLA   - ELLA likely from cardiorenal with RVE   - Diuresed in ICU and improved   - Monitor renal function and UOP   - Recurrent ELLA noted suspected in s/o vancomycin toxicity (4/7)   - UA negative and Urine Studies FeNa 0.9 % suggest pre-renal etiology  - Lasix discontinued for now  - s/p IV fluid trial on 4/8 with minimal improvement  - Trialing additional fluids on 4/10   - Trend BMP and I/Os    # Hypernatremia (resolved)  - Hypernatremia with fever spikes   - Fever curve improved and attempting to wean FWF   - now off FWF    INFECTIOUS DISEASE  # Recurrent fevers with fungemia from unknown source   - Recurrent fevers post ECMO decannulation thought initially to be cytokine surge   - BCx, SCx, UCx, RVP and MRSA RPT on 3/12 negative   - Completed zosyn (3/12-3/18) empirically with improved WBC , however recurrent fevers noted.   - RPT SCx, UA, CXR and RVP negative  - TTE 3/17 with no IE  - PanCT 3/17 with PNA and persistent panniculitis  - BCx 3/15 and 3/16 with candida albicans, rpt BCx 3/18 and 3/20 negative   - Concern for possible source from panniculitis   - Continue on Caspofungin 70 mg daily 3/17 - 4/15, ID consult appreciated   - 3/30 spiked temp cx's sent + ua and c/s pending Bcx neg to date - started on vanco /zosyn ID following  - Vanco stopped (4/7) due to ELLA concern for Vanco toxicty and continued on empiric Zosyn/ Caspofungin   - 4/3 - persistent fevers, leukocytosis. Will repeat CT C/A/P to evaluate for source - no source found - Bilat lung opacities mildly decreased     - 4/8 bone scan: : "1. Large area of soft tissue infection in the R side of the pelvis as detailed above. Slightly more intense activity in the R iliac bone compared to the L raises possibility of OM. 2. Intense activity in the lateral aspect of the R forefoot compatible with soft tissue infection; complicating osteomyelitis is not excluded. Milder activity in the medial aspect of the L forefoot may reflect soft tissue infection."  - ID consult appreciated. Continue on Zosyn 3.375 g 8, plan on 6 week course for possible OM until 5/11 and Caspofungin 70 mg daily, will plan on 4 week course ending 4/15  - MR Pelvis Bony (4/11): No osteomyelitis. Sacral decubitus wound is again seen extending superiorly along the superficial margin of the right gluteus zurdo to the level of L5 with small amount of fluid and gas within the tract. The underlying gluteus zurdo demonstrates a region of decreased enhancement consistent with ischemia or necrosis measuring up to 11.9 cm transversely. Muscle edema suggests a nonspecific myositis. Small bilateral hip joint effusions with mild synovitis is nonspecific.  - Surgery c/s (4/14) - plan for operative sacral wound debridement and I&D done 4/15 s  - Capsofungin finished 4/15    # MFPNA and abdominal pannus cellulitis   - Presented to  post URI with SOB and found with AHRF with progression to ARDS second to post viral MFPNA   - RVP, legionella, strept, BAL, BCx, UCx, HIV, PCP, and adenovirus PCR negative   - Initially started on multiple agents in ICU and ultimately completed zosyn (2/26-3/9) ZMAX (2/25-2/26) and vanco (2/26-3/1)     # R/o Sacral OM  - Noted with hard indurated sacral wound by Wound Care Team  - CT Pelvis (3/29) revealing superficial skin thickening overlying the sacrum with underlying edema of the subcutaneous fat, in keeping with the clinical history of sacral wound. No evidence of underlying tract or drainable fluid collection.  - CT 4/3 - no fluid collection in sacral tissue   - Given ongoing fever with no clear source with obtain MRI Pelvis per ID recs to r/o sacral OM once renal fx improves    # Penile discharge   - GC/ chlamydia negative   - HIV negative   - Monitor for now    # PPX   - MRSA PCR positive and completed bactroban (2/26-3/3)     HEME   # Anemia likely second to ECMO circuit vs AOCD   - HH low in ICU second to ECMO circuit   - HH dropping again today however no bleeding noted   - Microcytic and hemochromic, sent anemia panel 3/19  - Trend HH     VASCULAR   # R IJ and BL LE DVTs   - Post ECMO dopplers on 3/9 negative for BL UE/ LE DVTs.   - Subsequent post ECMO dopplers performed on 3/14 and noted with acute, non-occlusive deep vein thrombosis noted within the right internal jugular vein. Acute, occlusive deep vein thromboses noted within the right and left peroneal veins at both mid calves, and age-indeterminate, occlusive deep vein thrombosis visualized within the left gastrocnemius vein at the proximal calf.     - Continue on argatroban GTT and changed to Eliquis   - on heparin gtt in preparation for PEG removal (4/15) -  hold hep gtt at 12am tonight  - HOld Heparin gtt until am.    PODIATRY   # BL plantar feet blisters likely pressors induced  - Seen by podiatry and blisters lanced on 3/4 and again on 3/18  - Continue on local wound care per podiatry   - Podiatry following  - OK for WBAT will fu with PT for offloading shoe if appropriate  - Pain mgt consult   - Podiatry FU 3/28 / 4/3 done /following     ENDOCRINE  # Pre-DM2   - A1C 6.7  - Continue on ISS   - Monitor FS   - IF no demand then stop ISS     LINES/ TUBES  - R IJ and R FEM ECMO (2/26-3/7)     SKIN  # Pannus canndial intertrigo   # Sacrum/ buttock MAD  - Continue on clotrimazole cream   - WOC appreciated   -Seen by WC pt noted to have oozing from Buttock wound surgicel placed by wound care On follow up no further bleeding  - Wound care placed orders   - No bleeding from wound     ETHICS/ GOC    - FULL CODE     DISPO - PT following  Seen by PMR for acute rehab per recs

## 2025-04-15 NOTE — BRIEF OPERATIVE NOTE - NSICDXBRIEFPROCEDURE_GEN_ALL_CORE_FT
PROCEDURES:  Debridement, ulcer, sacral or ischial region 16-Apr-2025 10:11:14  J Carlos Abreu  
PROCEDURES:  Removal or replacement of percutaneous endoscopic gastrostomy (PEG) tube 15-Apr-2025 09:54:44  Jung Greenwood  
PROCEDURES:  Tracheostomy, with PEG tube insertion 08-Mar-2025 08:38:53  Gatito COVARRUBIAS

## 2025-04-16 LAB
ANION GAP SERPL CALC-SCNC: 12 MMOL/L — SIGNIFICANT CHANGE UP (ref 7–14)
APTT BLD: 31 SEC — SIGNIFICANT CHANGE UP (ref 24.5–35.6)
APTT BLD: 61.7 SEC — HIGH (ref 24.5–35.6)
BUN SERPL-MCNC: 11 MG/DL — SIGNIFICANT CHANGE UP (ref 7–23)
CALCIUM SERPL-MCNC: 9.7 MG/DL — SIGNIFICANT CHANGE UP (ref 8.4–10.5)
CHLORIDE SERPL-SCNC: 100 MMOL/L — SIGNIFICANT CHANGE UP (ref 98–107)
CO2 SERPL-SCNC: 31 MMOL/L — SIGNIFICANT CHANGE UP (ref 22–31)
CREAT SERPL-MCNC: 1.4 MG/DL — HIGH (ref 0.5–1.3)
CULTURE RESULTS: SIGNIFICANT CHANGE UP
EGFR: 66 ML/MIN/1.73M2 — SIGNIFICANT CHANGE UP
EGFR: 66 ML/MIN/1.73M2 — SIGNIFICANT CHANGE UP
GLUCOSE BLDC GLUCOMTR-MCNC: 104 MG/DL — HIGH (ref 70–99)
GLUCOSE BLDC GLUCOMTR-MCNC: 110 MG/DL — HIGH (ref 70–99)
GLUCOSE BLDC GLUCOMTR-MCNC: 86 MG/DL — SIGNIFICANT CHANGE UP (ref 70–99)
GLUCOSE SERPL-MCNC: 80 MG/DL — SIGNIFICANT CHANGE UP (ref 70–99)
HCT VFR BLD CALC: 21.7 % — LOW (ref 39–50)
HGB BLD-MCNC: 8 G/DL — LOW (ref 13–17)
MAGNESIUM SERPL-MCNC: 1.8 MG/DL — SIGNIFICANT CHANGE UP (ref 1.6–2.6)
MCHC RBC-ENTMCNC: 33.6 PG — SIGNIFICANT CHANGE UP (ref 27–34)
MCHC RBC-ENTMCNC: 36.9 G/DL — HIGH (ref 32–36)
MCV RBC AUTO: 91.2 FL — SIGNIFICANT CHANGE UP (ref 80–100)
NRBC # BLD AUTO: 0 K/UL — SIGNIFICANT CHANGE UP (ref 0–0)
NRBC # FLD: 0 K/UL — SIGNIFICANT CHANGE UP (ref 0–0)
NRBC BLD AUTO-RTO: 0 /100 WBCS — SIGNIFICANT CHANGE UP (ref 0–0)
PHOSPHATE SERPL-MCNC: 5.5 MG/DL — HIGH (ref 2.5–4.5)
PLATELET # BLD AUTO: 471 K/UL — HIGH (ref 150–400)
POTASSIUM SERPL-MCNC: 3.9 MMOL/L — SIGNIFICANT CHANGE UP (ref 3.5–5.3)
POTASSIUM SERPL-SCNC: 3.9 MMOL/L — SIGNIFICANT CHANGE UP (ref 3.5–5.3)
RBC # BLD: 2.38 M/UL — LOW (ref 4.2–5.8)
RBC # FLD: 23.8 % — HIGH (ref 10.3–14.5)
SODIUM SERPL-SCNC: 143 MMOL/L — SIGNIFICANT CHANGE UP (ref 135–145)
SPECIMEN SOURCE: SIGNIFICANT CHANGE UP
WBC # BLD: 9.48 K/UL — SIGNIFICANT CHANGE UP (ref 3.8–10.5)
WBC # FLD AUTO: 9.48 K/UL — SIGNIFICANT CHANGE UP (ref 3.8–10.5)

## 2025-04-16 PROCEDURE — G0545: CPT

## 2025-04-16 PROCEDURE — 99233 SBSQ HOSP IP/OBS HIGH 50: CPT

## 2025-04-16 PROCEDURE — 99232 SBSQ HOSP IP/OBS MODERATE 35: CPT

## 2025-04-16 RX ORDER — HYDROMORPHONE/SOD CHLOR,ISO/PF 2 MG/10 ML
0.5 SYRINGE (ML) INJECTION EVERY 12 HOURS
Refills: 0 | Status: DISCONTINUED | OUTPATIENT
Start: 2025-04-16 | End: 2025-04-17

## 2025-04-16 RX ORDER — OXYCODONE HYDROCHLORIDE 30 MG/1
10 TABLET ORAL EVERY 6 HOURS
Refills: 0 | Status: DISCONTINUED | OUTPATIENT
Start: 2025-04-16 | End: 2025-04-17

## 2025-04-16 RX ORDER — DULOXETINE 20 MG/1
60 CAPSULE, DELAYED RELEASE ORAL DAILY
Refills: 0 | Status: DISCONTINUED | OUTPATIENT
Start: 2025-04-16 | End: 2025-04-25

## 2025-04-16 RX ORDER — HEPARIN SODIUM 1000 [USP'U]/ML
3500 INJECTION INTRAVENOUS; SUBCUTANEOUS
Qty: 25000 | Refills: 0 | Status: DISCONTINUED | OUTPATIENT
Start: 2025-04-16 | End: 2025-04-17

## 2025-04-16 RX ORDER — HEPARIN SODIUM 1000 [USP'U]/ML
10000 INJECTION INTRAVENOUS; SUBCUTANEOUS EVERY 6 HOURS
Refills: 0 | Status: DISCONTINUED | OUTPATIENT
Start: 2025-04-16 | End: 2025-04-17

## 2025-04-16 RX ORDER — HEPARIN SODIUM 1000 [USP'U]/ML
5000 INJECTION INTRAVENOUS; SUBCUTANEOUS EVERY 6 HOURS
Refills: 0 | Status: DISCONTINUED | OUTPATIENT
Start: 2025-04-16 | End: 2025-04-17

## 2025-04-16 RX ADMIN — QUETIAPINE FUMARATE 25 MILLIGRAM(S): 25 TABLET ORAL at 21:11

## 2025-04-16 RX ADMIN — Medication 0.5 MILLIGRAM(S): at 11:42

## 2025-04-16 RX ADMIN — CLOTRIMAZOLE 1 APPLICATION(S): 1 CREAM TOPICAL at 05:44

## 2025-04-16 RX ADMIN — MUPIROCIN CALCIUM 1 APPLICATION(S): 20 CREAM TOPICAL at 05:45

## 2025-04-16 RX ADMIN — HEPARIN SODIUM 3500 UNIT(S)/HR: 1000 INJECTION INTRAVENOUS; SUBCUTANEOUS at 13:06

## 2025-04-16 RX ADMIN — Medication 0.5 MILLIGRAM(S): at 17:46

## 2025-04-16 RX ADMIN — Medication 1 APPLICATION(S): at 05:45

## 2025-04-16 RX ADMIN — OXYCODONE HYDROCHLORIDE 10 MILLIGRAM(S): 30 TABLET ORAL at 21:09

## 2025-04-16 RX ADMIN — GABAPENTIN 900 MILLIGRAM(S): 400 CAPSULE ORAL at 13:05

## 2025-04-16 RX ADMIN — SODIUM HYPOCHLORITE 1 APPLICATION(S): 0.12 SOLUTION TOPICAL at 05:46

## 2025-04-16 RX ADMIN — AMLODIPINE BESYLATE 10 MILLIGRAM(S): 10 TABLET ORAL at 05:48

## 2025-04-16 RX ADMIN — DULOXETINE 60 MILLIGRAM(S): 20 CAPSULE, DELAYED RELEASE ORAL at 17:46

## 2025-04-16 RX ADMIN — Medication 25 GRAM(S): at 21:14

## 2025-04-16 RX ADMIN — GABAPENTIN 900 MILLIGRAM(S): 400 CAPSULE ORAL at 05:47

## 2025-04-16 RX ADMIN — SODIUM HYPOCHLORITE 1 APPLICATION(S): 0.12 SOLUTION TOPICAL at 17:46

## 2025-04-16 RX ADMIN — LIDOCAINE HYDROCHLORIDE 1 PATCH: 20 JELLY TOPICAL at 17:45

## 2025-04-16 RX ADMIN — Medication 40 MILLIGRAM(S): at 05:48

## 2025-04-16 RX ADMIN — Medication 0.5 MILLIGRAM(S): at 03:55

## 2025-04-16 RX ADMIN — SEVELAMER HYDROCHLORIDE 800 MILLIGRAM(S): 800 TABLET ORAL at 17:46

## 2025-04-16 RX ADMIN — Medication 3 MILLIGRAM(S): at 21:10

## 2025-04-16 RX ADMIN — Medication 1 TABLET(S): at 13:05

## 2025-04-16 RX ADMIN — Medication 1 APPLICATION(S): at 05:39

## 2025-04-16 RX ADMIN — GABAPENTIN 900 MILLIGRAM(S): 400 CAPSULE ORAL at 21:09

## 2025-04-16 RX ADMIN — Medication 0.5 MILLIGRAM(S): at 10:00

## 2025-04-16 RX ADMIN — HEPARIN SODIUM 3500 UNIT(S)/HR: 1000 INJECTION INTRAVENOUS; SUBCUTANEOUS at 19:22

## 2025-04-16 RX ADMIN — DULOXETINE 30 MILLIGRAM(S): 20 CAPSULE, DELAYED RELEASE ORAL at 13:05

## 2025-04-16 RX ADMIN — Medication 25 GRAM(S): at 05:38

## 2025-04-16 RX ADMIN — CLOTRIMAZOLE 1 APPLICATION(S): 1 CREAM TOPICAL at 17:44

## 2025-04-16 RX ADMIN — Medication 25 GRAM(S): at 13:06

## 2025-04-16 RX ADMIN — HEPARIN SODIUM 3500 UNIT(S)/HR: 1000 INJECTION INTRAVENOUS; SUBCUTANEOUS at 21:02

## 2025-04-16 RX ADMIN — SEVELAMER HYDROCHLORIDE 800 MILLIGRAM(S): 800 TABLET ORAL at 13:05

## 2025-04-16 NOTE — PROGRESS NOTE ADULT - ASSESSMENT
This is a 40 y/o M w/ PMHx AF (not on AC), HTN, BEA, pseudotumor cerebri s/p LP in 2024, initially presented to [place] on 2/24, SOB, LE edema with URI symptoms and sick contacts, noted to have severe ARDS, intubated however still hypoxia, cannulated for VV ECMO on 2/25, transferred to [facility] on 2/25, started on empiric Vanco, Zosyn for multifocal PNA, abdominal wall cellulitis, s/p trach placement by CT surgery on 3/7, ECMO decannulated on 3/7. Zosyn held on 3/9.  C/b fevers on 3/10, restarted on Zosyn, transferred to [facility] on 3/13, Bcx now w/ yeast.    #Sacral ulcer, with extensive tunneling, no OM on MRI, however c/f OM on WBC scan  #Candida albicans fungemia   #Fevers   #Multifocal PNA, abdominal wall cellulitis s/p Vanco/Zosyn  #ARDS, hypoxic respiratory failure requiring VV EMCO 2/2 multifocal PNA? s/p decannulation on 3/7    Overall, 40 y/o M w/ PMHx AF (not on AC), HTN, BEA, pseudotumor cerebri s/p LP in 2024 admitted to [facility] on 2/26 for ARDS, hypoxic respiratory failure requiring VV EMCO 2/2 multifocal PNA? s/p decannulation on 3/7, c/b abdominal wall cellulitis s/p Vancomycin/Zosyn, s/p trach on 3/7, in the setting of persistent fevers despite Zosyn, BCx now w/ yeast, Candida albicans   Unclear source for yeast in BCx, pt had all central lines removed on 3/7, not getting TPN, no abdominal pain on exam, PEG site well appearing.     CT A/P w/o clear abdominal source of fungemia, possibly 2/2 abdominal wall cellulitis? TTE w/o IE.  BCx 3/18 1/2 positive, 3/20 NGTD x 2.     Pt with worsening sepsis on 3/30, febrile to 103, WBC 16.   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   Pt with deep tunneled wound to left, no drainage, malodor per wound care.   CT A/P w/ b/l pulmonary opacities but improved, no sacral abscess   WBC scan with soft tissue infection R side of pelvis, intense activity in R iliac bone possible OM. Lateral R forefoot with soft tissue infection, possible OM?  Wound examined with wound care, large R buttocks soft tissue ulcer, does not look infected, however deep tunneling with murky fluid, likely source of infection   MRI w/o OM, however given WBC scan findings, will still treat for 6 weeks     S/p PEG removal and sacral ulcer debridement, gram stain w/ GNR    Recommendations;   1. Continue with Zosyn 3.375 g 8, plan on 6 week course for possible OM until 5/11  2. S/p Caspofungin 70 mg daily, 4 week course ended 4/15  3. F/u OR studies     Thank you for consulting us and involving us in the management of this patient's case. In addition to reviewing history, imaging, documents, labs, microbiology, and infection control strategies and potential issues.     ID will continue to follow    Shailesh Owens M.D.  Attending Physician  Division of Infectious Diseases  Department of Medicine    Please contact through MS Teams message.  Office: [phone] (after 5 PM or weekend)

## 2025-04-16 NOTE — PROGRESS NOTE ADULT - ASSESSMENT
38 YO M with PMHx of morbid obesity, BEA on CPAP, pAFIB (not on AC), HTN, and BL papilledema attributed to pseudotumor cerebri who presented initially to [facility] on 2/24 with SOB and BL LE edema. Found with URI outpatient with progressive SOB, and concern for noted for MFPNA on imaging. Course progressed with AHRF with worsening O2 demand, respiratory distress, secretions, and somnolence requiring intubation (difficult with success after 6 attempts) in ED and admission to [facility] MICU. While in MICU, patient noted with increasing O2 demand with no improvement despite optimal vent management. Concern noted for progressive ARDS, unable to prone given morbid obesity, and ultimately cannulated for vvECMO on 2/25 and transferred to [facility] for further management on 2/26. While at [facility], tx with diuresis and ABX, and ultimately decannulated and s/p 60XLTCP trach and PEG on 3/7. Patient ultimately transferred to [facility] on 3/11 and course complicated by recurrent high grade fevers and found with fungemia.    NEUROLOGY  # Hx of Pseudotumor Cerebri   - s/p LP in 2024 with high opening pressure  - s/p MRI 2024 with possible pituitary mass but unclear because of pressure effect from pseudotumor cerebri causing partially empty sella.  - Prolactin elevated   - ACTH low but received steroids during admission   - FT4 low normal and TSH normal, but given FT4 low normal TSH should be higher   - Discuss endocrine consult for inhouse work up vs outpatient.     # Sedation   - Weaned off sedation in ICU   - Nimbex turned off 2/27   - Prop turned off 3/9   - Precedex turned off and catapres started 3/11  - catapres 0.2 TID (decreased 3/25) - tapered down 3/31 to 0.1 tid - discontinued 4/3     # Insomnia   - Patient worked night shift x 15 years   - Trouble sleeping at night leading to day time sleepiness and poor participation with PT  - Continue on Seroquel 25 QHS (increased 3/18)   - Continue on melatonin   - Continue on Neurontin as below    # BL LE neuropathy   - CIPN concern   - Neurontin increased to 300mg Q8H with relief   - added Percocet Q4H for moderate pain (3/25) and continue on Dilaudid Q6H for severe pain   - Pain mgt called to consult - recs for tylenol ATC, NEurontin increased to 400mg q6, Oxycodone 5 q4h prn , Dilaudid for BREAKTHROUGH pain only, Lidocaine patches on each leg, ice prn to feet  - Some improvement in pain - PT consult for TENS,   - oxycodone inc to 10mg as pt with no relief /buttocks area indurated /tender CT ordered -no abscess  - C/w Gabapentin : 600/600/800 and up titrate as required  - Switched to PO Dilaudid 2mg Q6H PRN moderate-severe and IV Dilaudid PRN severe breakthrough and with dressing changes   - Still with severe pain 10/10 despite change to Dilaudid will increase to 4mg q4h prn with iv Dilaudid breakthrough  - Neurontin increased to 800mg q8h Monitor neuro status/ lethargy   - I have reviewed with pt to not wait to long for dose of pain medication to  prevent pain from escalating   - Meds reviewed despite being in constant pain pt not taking Dilaudid as often as he can often with 10/10 pain Dilaudid will be every 4 hours with parameters and pt can refuse Will reconsult pain mgt again for any recommendations on Monday   - Pain management reconsulted , recs to stop Dilaudid PO ATC , continue Dilaudid for breakthrough pain with dressing changes, increase gabapentin to 900 mg Q8, add Cymbalta 30 mg QD and Flexeril 5mg Q8  - Neurontin inc to 900 with some decrease in pain but still severe   - Will do trial of capsaicin bid with strict instructions to avoid open /broken irritated skin/ and bandaged areas     CARDIOVASCULAR  # HTN   - HTN noted in ICU requiring cardene and esmolol GTTs   - Lisinopril dc'ed to increase catapres   - Continue on norvasc 10, catapres weaned off 4/3  - Monitor BP     # AFIB   - Hx of pAFIB   - No RVR episodes or pAFIB episodes in ICU   - No rate control medications needed  - Monitor on telemetry     # RV dilation second to PHTN   - POCUS on arrival to [facility] with RVE concern   - TTE 10/2024 with EF 55-60 with normal LVSF, moderate LVH and normal RVSF and size with no concern for pHTN   - TTE on 2/25 at  with EF 61 and normal LVSF, but enlarged RV size with reduced RVSF, mild TR, mild PHTN with PASP 49, and dilated IVC to 3.4cn, but normal TAPSE 2.1.  - Continue on aggressive diuresis in ICU    - TTE 3/11 with RVSF reduced with TAPSE 1.6. IVC improved to 2.12cm.   - Lasix 40 PO QD discontinued 4/7 secondary to ELLA  - Monitor IO/ BMP    RESPIRATORY  # ARDS second to MFPNA   - Hx of BEA on CPAP presented to  post URI with SOB and found with AHRF with progression to ARDS second to post viral MFPNA   - Intubated 2/25   - Cannulated for vvECMO 2/26   - s/p 60XLTCP tracheostomy 3/7   - Decannulated 3/7   - CTSx removed tracheostomy and ECMO sutures on 3/17   - Continue on TC QD with PMV as able with strong phonation  - Continue on nebs and HTS   -  trials continue and now trialing overnight with BIPAP for OHS (10/8/12)  - Continue on PS 18/10/40 QHS given OHS   - Decannulated 3/28   - c/w nasal cannula 2L     GI  # Dysphagia   - s/p PEG 3/7   - Attempted green dye on 3/13 and failed  - Continue on TF  - Continue on miralax and senna  - RPT green dye passed 3/19  - 3/20 Passed FEEST - on soft bite sized with thin liquids Added consistent carb 2/2 weight/Elevated A1c   - plan for OR 4/15 for PEG removal with cardiothoracic surgery   - PEG removed in OR DSD over site 4/15     # Mild transaminitis   - LFTs elevated in ICU with TBILI elevated likely from ECMO hemolysis   - US ABD with hepatic steatosis   - Trend LFTs      RENAL  # ELLA   - ELLA likely from cardiorenal with RVE   - Diuresed in ICU and improved   - Monitor renal function and UOP   - Recurrent ELLA noted suspected in s/o vancomycin toxicity (4/7)   - UA negative and Urine Studies FeNa 0.9 % suggest pre-renal etiology  - Lasix discontinued for now  - s/p IV fluid trial on 4/8 with minimal improvement  - Trialing additional fluids on 4/10   - Trend BMP and I/Os    # Hypernatremia (resolved)  - Hypernatremia with fever spikes   - Fever curve improved and attempting to wean FWF   - now off FWF    INFECTIOUS DISEASE  # Recurrent fevers with fungemia from unknown source   - Recurrent fevers post ECMO decannulation thought initially to be cytokine surge   - BCx, SCx, UCx, RVP and MRSA RPT on 3/12 negative   - Completed zosyn (3/12-3/18) empirically with improved WBC , however recurrent fevers noted.   - RPT SCx, UA, CXR and RVP negative  - TTE 3/17 with no IE  - PanCT 3/17 with PNA and persistent panniculitis  - BCx 3/15 and 3/16 with candida albicans, rpt BCx 3/18 and 3/20 negative   - Concern for possible source from panniculitis   - Continue on Caspofungin 70 mg daily 3/17 - 4/15, ID consult appreciated   - 3/30 spiked temp cx's sent + ua and c/s pending Bcx neg to date - started on vanco /zosyn ID following  - Vanco stopped (4/7) due to ELLA concern for Vanco toxicty and continued on empiric Zosyn/ Caspofungin   - 4/3 - persistent fevers, leukocytosis. Will repeat CT C/A/P to evaluate for source - no source found - Bilat lung opacities mildly decreased     - 4/8 bone scan: : "1. Large area of soft tissue infection in the R side of the pelvis as detailed above. Slightly more intense activity in the R iliac bone compared to the L raises possibility of OM. 2. Intense activity in the lateral aspect of the R forefoot compatible with soft tissue infection; complicating osteomyelitis is not excluded. Milder activity in the medial aspect of the L forefoot may reflect soft tissue infection."  - ID consult appreciated. Continue on Zosyn 3.375 g 8, plan on 6 week course for possible OM until 5/11 and Caspofungin 70 mg daily, will plan on 4 week course ending 4/15  - MR Pelvis Bony (4/11): No osteomyelitis. Sacral decubitus wound is again seen extending superiorly along the superficial margin of the right gluteus zurdo to the level of L5 with small amount of fluid and gas within the tract. The underlying gluteus zurdo demonstrates a region of decreased enhancement consistent with ischemia or necrosis measuring up to 11.9 cm transversely. Muscle edema suggests a nonspecific myositis. Small bilateral hip joint effusions with mild synovitis is nonspecific.  - Surgery c/s (4/14) - plan for operative sacral wound debridement and I&D done 4/15 s  - Caspofungin finished 4/15    # MFPNA and abdominal pannus cellulitis   - Presented to  post URI with SOB and found with AHRF with progression to ARDS second to post viral MFPNA   - RVP, legionella, strept, BAL, BCx, UCx, HIV, PCP, and adenovirus PCR negative   - Initially started on multiple agents in ICU and ultimately completed zosyn (2/26-3/9) ZMAX (2/25-2/26) and vanco (2/26-3/1)     # R/o Sacral OM  - Noted with hard indurated sacral wound by Wound Care Team  - CT Pelvis (3/29) revealing superficial skin thickening overlying the sacrum with underlying edema of the subcutaneous fat, in keeping with the clinical history of sacral wound. No evidence of underlying tract or drainable fluid collection.  - CT 4/3 - no fluid collection in sacral tissue   - Given ongoing fever with no clear source with obtain MRI Pelvis per ID recs to r/o sacral OM once renal fx improves    # Penile discharge   - GC/ chlamydia negative   - HIV negative   - Monitor for now    # PPX   - MRSA PCR positive and completed bactroban (2/26-3/3)     HEME   # Anemia likely second to ECMO circuit vs AOCD   - HH low in ICU second to ECMO circuit   - HH dropping again today however no bleeding noted   - Microcytic and hemochromic, sent anemia panel 3/19  - Trend HH     VASCULAR   # R IJ and BL LE DVTs   - Post ECMO dopplers on 3/9 negative for BL UE/ LE DVTs.   - Subsequent post ECMO dopplers performed on 3/14 and noted with acute, non-occlusive deep vein thrombosis noted within the right internal jugular vein. Acute, occlusive deep vein thromboses noted within the right and left peroneal veins at both mid calves, and age-indeterminate, occlusive deep vein thrombosis visualized within the left gastrocnemius vein at the proximal calf.     - Continue on argatroban GTT and changed to Eliquis   - on heparin gtt in preparation for PEG removal (4/15) -  hold hep gtt at 12am tonight  - HOld Heparin gtt until am eval to restart AC in am 4/16    PODIATRY   # BL plantar feet blisters likely pressors induced  - Seen by podiatry and blisters lanced on 3/4 and again on 3/18  - Continue on local wound care per podiatry   - Podiatry following  - OK for WBAT will fu with PT for offloading shoe if appropriate  - Pain mgt consult   - Podiatry FU 3/28 / 4/3 done /following     ENDOCRINE  # Pre-DM2   - A1C 6.7  - Continue on ISS   - Monitor FS   - IF no demand then stop ISS     LINES/ TUBES  - R IJ and R FEM ECMO (2/26-3/7)     SKIN  # Pannus canndial intertrigo   # Sacrum/ buttock MAD  - Continue on clotrimazole cream   - WOC appreciated   -Seen by WC pt noted to have oozing from Buttock wound surgicel placed by wound care On follow up no further bleeding  - Wound care placed orders   - No bleeding from wound     ETHICS/ GOC    - FULL CODE     DISPO - PT following  Seen by PMR for acute rehab per recs

## 2025-04-16 NOTE — PROGRESS NOTE ADULT - SUBJECTIVE AND OBJECTIVE BOX
Infectious Diseases Follow Up:    Patient is a 39y old  Male who presents with a chief complaint of sob (02 Mar 2025 06:19)      Interval History/ROS:  No acute events, pt still w/ b/l feet pain     Allergies  No Known Allergies        ANTIMICROBIALS:  piperacillin/tazobactam IVPB.. 3.375 every 8 hours      Current Abx:     Previous Abx     OTHER MEDS:  MEDICATIONS  (STANDING):  acetaminophen     Tablet .. 650 every 6 hours PRN  albuterol/ipratropium for Nebulization 3 every 6 hours PRN  amLODIPine   Tablet 10 daily  cyclobenzaprine 5 three times a day PRN  dextrose 50% Injectable 25 once  dextrose 50% Injectable 12.5 once  dextrose 50% Injectable 25 once  dextrose Oral Gel 15 once PRN  DULoxetine 30 daily  gabapentin 900 every 8 hours  glucagon  Injectable 1 once  HYDROmorphone  Injectable 0.5 every 6 hours PRN  insulin lispro (ADMELOG) corrective regimen sliding scale  three times a day before meals  insulin lispro (ADMELOG) corrective regimen sliding scale  at bedtime  melatonin 3 at bedtime  pantoprazole    Tablet 40 before breakfast  polyethylene glycol 3350 17 daily  QUEtiapine 25 at bedtime  senna 2 at bedtime      Vital Signs Last 24 Hrs  T(C): 36.8 (16 Apr 2025 04:00), Max: 37.1 (15 Apr 2025 20:00)  T(F): 98.3 (16 Apr 2025 04:00), Max: 98.7 (15 Apr 2025 20:00)  HR: 94 (16 Apr 2025 07:31) (94 - 114)  BP: 155/93 (16 Apr 2025 05:46) (116/79 - 159/96)  BP(mean): 108 (16 Apr 2025 05:46) (45 - 114)  RR: 15 (16 Apr 2025 04:00) (15 - 24)  SpO2: 100% (16 Apr 2025 07:31) (89% - 100%)    Parameters below as of 16 Apr 2025 04:00  Patient On (Oxygen Delivery Method): nasal cannula  O2 Flow (L/min): 2    PHYSICAL EXAM:  GENERAL: NAD  HEAD:  Atraumatic, Normocephalic  EYES: EOMI, conjunctiva and sclera clear  CHEST/LUNG: On NC, CTAB  HEART: RRR  ABDOMEN: Soft, Nontender, Nondistended  Extremities: B/l feet wrapped   PSYCH: AAOx3                            8.0    9.48  )-----------( 471      ( 16 Apr 2025 05:00 )             21.7       04-16    143  |  100  |  11  ----------------------------<  80  3.9   |  31  |  1.40[H]    Ca    9.7      16 Apr 2025 05:00  Phos  5.5     04-16  Mg     1.80     04-16        Urinalysis Basic - ( 16 Apr 2025 05:00 )    Color: x / Appearance: x / SG: x / pH: x  Gluc: 80 mg/dL / Ketone: x  / Bili: x / Urobili: x   Blood: x / Protein: x / Nitrite: x   Leuk Esterase: x / RBC: x / WBC x   Sq Epi: x / Non Sq Epi: x / Bacteria: x        MICROBIOLOGY:  v  Tissue SACRAL WOUND AEBNDEMENT  04-15-25   Testing in progress  --    Rare polymorphonuclear leukocytes per low power field  Rare Gram Negative Rods per oil power field      Blood Blood-Venous  04-07-25   No growth at 5 days  --  --      Blood Blood-Peripheral  04-07-25   No growth at 5 days  --  --      Clean Catch Clean Catch (Midstream)  03-31-25   <10,000 CFU/mL Normal Urogenital Kyle  --  --      Blood Blood-Peripheral  03-30-25   No growth at 5 days  --  --      Trach Asp Tracheal Aspirate  03-21-25   Commensal kyle consistent with body site  --    Few polymorphonuclear leukocytes per low power field  No Squamous epithelial cells per low power field  No organisms seen per oil power field      Blood Blood-Peripheral  03-20-25   No growth at 5 days  --  --      Blood Blood-Venous  03-20-25   No growth at 5 days  --  --      Blood Blood-Peripheral  03-18-25   No growth at 5 days  --  Blood Culture PCR  Candida albicans      Nares/Axilla/Groin Nares/Axilla/Groin  03-17-25   Culture NEGATIVE for Candida auris.  This surveillance culture is intended for Infection Control purposes only.  A negative result does not preclude the carriage of other fungal  organisms.  --  --                RADIOLOGY:

## 2025-04-16 NOTE — CHART NOTE - NSCHARTNOTEFT_GEN_A_CORE
pt seen and examined in RCU  s/p removal of PEG yesterday  site c,d,i  dressing changed  thoracic will sign off, please reconsult w concerns    Sophie SCOTT [id number]

## 2025-04-16 NOTE — PROGRESS NOTE ADULT - SUBJECTIVE AND OBJECTIVE BOX
CHIEF COMPLAINT: Patient is a 39y old  Male who presents with a chief complaint of sob (02 Mar 2025 06:19)      INTERVAL EVENTS: S/P sacral wound debridement /PEG removal     ROS: Seen by bedside during AM rounds     OBJECTIVE:  ICU Vital Signs Last 24 Hrs  T(C): 36.8 (16 Apr 2025 04:00), Max: 37.1 (15 Apr 2025 20:00)  T(F): 98.3 (16 Apr 2025 04:00), Max: 98.7 (15 Apr 2025 20:00)  HR: 94 (16 Apr 2025 07:31) (94 - 114)  BP: 155/93 (16 Apr 2025 05:46) (116/79 - 159/96)  BP(mean): 108 (16 Apr 2025 05:46) (45 - 114)  ABP: --  ABP(mean): --  RR: 15 (16 Apr 2025 04:00) (15 - 24)  SpO2: 100% (16 Apr 2025 07:31) (89% - 100%)    O2 Parameters below as of 16 Apr 2025 04:00  Patient On (Oxygen Delivery Method): nasal cannula  O2 Flow (L/min): 2            04-15 @ 07:01  -  04-16 @ 07:00  --------------------------------------------------------  IN: 400 mL / OUT: 1900 mL / NET: -1500 mL      CAPILLARY BLOOD GLUCOSE      POCT Blood Glucose.: 98 mg/dL (15 Apr 2025 21:11)      PHYSICAL EXAM:  General:   HEENT:   Lymph Nodes:  Neck:   Respiratory:   Cardiovascular:   Abdomen:   Extremities:   Skin:   Neurological:  Psychiatry:        HOSPITAL MEDICATIONS:  MEDICATIONS  (STANDING):  amLODIPine   Tablet 10 milliGRAM(s) Oral daily  capsaicin HP 0.075% Cream 1 Application(s) Topical two times a day  chlorhexidine 2% Cloths 1 Application(s) Topical <User Schedule>  clotrimazole 1% Cream 1 Application(s) Topical two times a day  Dakins Solution - 1/4 Strength 1 Application(s) Topical two times a day  dextrose 50% Injectable 25 Gram(s) IV Push once  dextrose 50% Injectable 12.5 Gram(s) IV Push once  dextrose 50% Injectable 25 Gram(s) IV Push once  DULoxetine 30 milliGRAM(s) Oral daily  gabapentin 900 milliGRAM(s) Oral every 8 hours  glucagon  Injectable 1 milliGRAM(s) IntraMuscular once  insulin lispro (ADMELOG) corrective regimen sliding scale   SubCutaneous three times a day before meals  insulin lispro (ADMELOG) corrective regimen sliding scale   SubCutaneous at bedtime  lidocaine   4% Patch 1 Patch Transdermal daily  lidocaine   4% Patch 1 Patch Transdermal daily  melatonin 3 milliGRAM(s) Oral at bedtime  multivitamin 1 Tablet(s) Oral daily  mupirocin 2% Ointment 1 Application(s) Topical two times a day  pantoprazole    Tablet 40 milliGRAM(s) Oral before breakfast  piperacillin/tazobactam IVPB.. 3.375 Gram(s) IV Intermittent every 8 hours  polyethylene glycol 3350 17 Gram(s) Oral daily  QUEtiapine 25 milliGRAM(s) Oral at bedtime  senna 2 Tablet(s) Oral at bedtime  sevelamer carbonate 800 milliGRAM(s) Oral three times a day with meals    MEDICATIONS  (PRN):  acetaminophen     Tablet .. 650 milliGRAM(s) Oral every 6 hours PRN Temp greater or equal to 38C (100.4F), Mild Pain (1 - 3), Moderate Pain (4 - 6)  albuterol/ipratropium for Nebulization 3 milliLiter(s) Nebulizer every 6 hours PRN Shortness of Breath and/or Wheezing  cyclobenzaprine 5 milliGRAM(s) Oral three times a day PRN Muscle Spasm  dextrose Oral Gel 15 Gram(s) Oral once PRN Blood Glucose LESS THAN 70 milliGRAM(s)/deciliter  HYDROmorphone  Injectable 0.5 milliGRAM(s) IV Push every 6 hours PRN dressing changes and severe breakthrough      LABS:                        8.0    9.48  )-----------( 471      ( 16 Apr 2025 05:00 )             21.7     04-16    143  |  100  |  11  ----------------------------<  80  3.9   |  31  |  1.40[H]    Ca    9.7      16 Apr 2025 05:00  Phos  5.5     04-16  Mg     1.80     04-16      PT/INR - ( 15 Apr 2025 05:45 )   PT: 15.0 sec;   INR: 1.30 ratio         PTT - ( 16 Apr 2025 05:00 )  PTT:31.0 sec  Urinalysis Basic - ( 16 Apr 2025 05:00 )    Color: x / Appearance: x / SG: x / pH: x  Gluc: 80 mg/dL / Ketone: x  / Bili: x / Urobili: x   Blood: x / Protein: x / Nitrite: x   Leuk Esterase: x / RBC: x / WBC x   Sq Epi: x / Non Sq Epi: x / Bacteria: x

## 2025-04-16 NOTE — PROGRESS NOTE ADULT - ASSESSMENT
39M with PMHx morbid obesity, BEA on CPAP, pAFIB (not on AC), HTN, and BL papilledema attributed to pseudotumor cerebri and complex hospitalization for MFPNA leading to AHRF requiring ECMO s/p decann, trach/PEG, with recurrent fevers, fungemia, now in RCU. Acute Care Surgery consulted for debridement/I&D evaluation of sacral decubitus wound. Pt now s/p debridement of sacral wound, w removal of PEG and EGD 4/15. Recovering well.     Plan:  - Ok to resume AC from surgical perspective   - Management of Sacral Wound per wound care   - Surgery to s/o , reconsult PRN    B Team, [id number]

## 2025-04-16 NOTE — PROGRESS NOTE ADULT - NS ATTEND AMEND GEN_ALL_CORE FT
40YO Male with obesity (Class III, BMI 69), pAfib (no AC), HTN, BEA (2019 AHI 34 non-compliant on CPAP [id number]), and history of b/l papilledema attributed to pseudotumor cerebri initially presenting as a transfer [facility] on 2/26 for acute hypoxemic respiratory failure s/p ETT intubation/MV with progression to refractory ARDS s/p initiation of vv-ECMO support (2/26-3/7) with failure to wean from ventilator s/p tracheostomy placement, further found to have RV failure s/p aggressive diuresis and PNA followed by severe sepsis 2/2 panniculitis c/b candida albicans fungemia now transferred to [facility] for further medical management.     #Acute hypoxemic and hypercapnic respiratory failure s/p tracheostomy placement   - Decannulated 3/28, continues to use Bilevel NIV qhs    #RV failure 2/2 pulmHTN   - Maintaining euvolemia     #AFib - rate controlled     #DVT right IJ and bilateral peroneal veins - AC for DVT and AFib, held for surgery, will resume per surgery     #Transaminitis improving  -Hepatomegaly will need to follow as outpatient    #Fungemia  #Sacral ulceration with induration  - NM WBC scan performed  concerning for active infection with possible osteomyelitis of right pelvis/iliac bone. MRI unrevealing but given high clinical suspicion treating as OM. Pt will need long-term Abx, on Zosyn per ID until 5/11. Pt on caspofungin 4/15. Surgery consulted  - patient sp OR wound debridement on 4/15    #Pain  - Neuropathic pain  Evaluated by chronic pain service 4/8 as patient continues to have uncontrolled pain in the LE - Gabapentin now 900 q8h, Flexeril and Cymbalta 30 mg po daily.  Dilaudid 0.5 mg IV  for dressing changes.   - also sp debridement of sacral wound   - pain service evaluated 40YO Male with obesity (Class III, BMI 69), pAfib (no AC), HTN, BEA (2019 AHI 34 non-compliant on CPAP [id number]), and history of b/l papilledema attributed to pseudotumor cerebri initially presenting as a transfer [facility] on 2/26 for acute hypoxemic respiratory failure s/p ETT intubation/MV with progression to refractory ARDS s/p initiation of vv-ECMO support (2/26-3/7) with failure to wean from ventilator s/p tracheostomy placement, further found to have RV failure s/p aggressive diuresis and PNA followed by severe sepsis 2/2 panniculitis c/b candida albicans fungemia now transferred to [facility] for further medical management.     #Acute hypoxemic and hypercapnic respiratory failure s/p tracheostomy placement   - Decannulated 3/28, continues to use Bilevel NIV qhs    #RV failure 2/2 pulmHTN   - Maintaining euvolemia     #AFib - rate controlled     #DVT right IJ and bilateral peroneal veins - AC for DVT and AFib, held for surgery, resume hep gtt after 24 hrs - if no significant bleeding will then transition to Eliquis     #Transaminitis improving  -Hepatomegaly will need to follow as outpatient    #Fungemia  #Sacral ulceration with induration  - NM WBC scan performed  concerning for active infection with possible osteomyelitis of right pelvis/iliac bone. MRI unrevealing but given high clinical suspicion treating as OM. Pt will need long-term Abx, on Zosyn per ID until 5/11. Pt on caspofungin 4/15. Surgery consulted  - patient sp OR wound debridement on 4/15    #Pain  - Neuropathic pain  Evaluated by chronic pain service 4/8 as patient continues to have uncontrolled pain in the LE - Gabapentin now 900 q8h, Flexeril and Cymbalta 30 mg po daily.  Dilaudid 0.5 mg IV  for dressing changes.   - also sp debridement of sacral wound   - pain service evaluated

## 2025-04-16 NOTE — CHART NOTE - NSCHARTNOTEFT_GEN_A_CORE
Spoke to primary team ACP Bharti BRYANT Patient s/p I&D and debridement of sacral wound in the OR by general surgery on 4/15. General surgery following patient and wound care recs provided by general surgery on 4/15. Discussed with ACP Bharti to reconsult if needed or if general surgery signs off.     Milly Baron MSN, RN, AGNP-BC, CWCN  Wound Surgery Service [facility] User

## 2025-04-16 NOTE — PROGRESS NOTE ADULT - SUBJECTIVE AND OBJECTIVE BOX
Chief Complaint: Feet pain     HPI:    This is a 40 y/o M w/ PMHx AF (not on AC), HTN, BEA, pseudotumor cerebri s/p LP in 2024, initially presented to [facility] on 2/24, SOB, LE edema with URI symptoms and sick contacts, noted to have severe ARDS, intubated however still hypoxia, cannulated for VV ECMO on 2/25, transferred to [facility] on 2/25, started on empiric Vanco, Zosyn for multifocal PNA, abdominal wall cellulitis, s/p trach placement by CT surgery on 3/7, ECMO decannulated on 3/7. Zosyn held on 3/9.  C/b fevers on 3/10, restarted on Zosyn, transferred to [facility] on 3/13, Bcx now w/ yeast.    #Sacral ulcer, with extensive tunneling, no OM on MRI, however c/f OM on WBC scan  #Candida albicans fungemia   #Fevers   #Multifocal PNA, abdominal wall cellulitis s/p Vanco/Zosyn  #ARDS, hypoxic respiratory failure requiring VV EMCO 2/2 multifocal PNA? s/p decannulation on 3/7    [facility] labs notable for MRSA PCR -, Fluvid -, urine legionella -, sputum gram stain  with GPC and GPR    CTA chest on 2/24 negative for pulmonary embolism, notable for patchy bilateral lower lobe opacities, hepatomegaly, mild ascites, edema/induration of the abdominal pannus.    LE duplex on 2/25 negative for DVT    TTE on 2/25 showed LV EF 61%, enlarged RV with TAPSE 2.1cm, ePASP at least 49 (Patient had 10/2024 TTE with normal LV and RV with ePASP 14).    Only home med is Lasix. Not on acetazolamide for pseudotumor or on Wegovy (was pending auth) (26 Feb 2025 01:48)    Reconsulted for feet pain. Patient seen at bedside. Reports " burning, shooting, pins and needles" pain located on the feet. Patient states he has similar pain on the legs which improved now. Denies pain at the sacral wound. States most of the pain is on his feet. Patient states the pin was better controlled when he was on PO Dilaudid. IV Dilaudid helps with the dressing changes currently per patient. Not getting much relief with Gabapentin. Tolerating regular diet well.  PAST MEDICAL & SURGICAL HISTORY:  HTN (hypertension)      Atrial fibrillation  paroxysmal      Papilledema of both eyes      Chronic sinusitis      Morbid obesity      No significant past surgical history          FAMILY HISTORY:      SOCIAL HISTORY:  [ ] Denies Smoking, Alcohol, or Drug Use    Allergies    No Known Allergies    Intolerances        PAIN MEDICATIONS:  acetaminophen     Tablet .. 650 milliGRAM(s) Oral every 6 hours PRN  cyclobenzaprine 5 milliGRAM(s) Oral three times a day PRN  DULoxetine 30 milliGRAM(s) Oral daily  gabapentin 900 milliGRAM(s) Oral every 8 hours  HYDROmorphone  Injectable 0.5 milliGRAM(s) IV Push every 6 hours PRN  melatonin 3 milliGRAM(s) Oral at bedtime  QUEtiapine 25 milliGRAM(s) Oral at bedtime      Heme:  heparin   Injectable 62477 Unit(s) IV Push every 6 hours PRN  heparin   Injectable 5000 Unit(s) IV Push every 6 hours PRN  heparin  Infusion. 3500 Unit(s)/Hr IV Continuous <Continuous>    Antibiotics:  piperacillin/tazobactam IVPB.. 3.375 Gram(s) IV Intermittent every 8 hours    Cardiovascular:  amLODIPine   Tablet 10 milliGRAM(s) Oral daily    GI:  pantoprazole    Tablet 40 milliGRAM(s) Oral before breakfast  polyethylene glycol 3350 17 Gram(s) Oral daily  senna 2 Tablet(s) Oral at bedtime    Endocrine:  dextrose 50% Injectable 25 Gram(s) IV Push once  dextrose 50% Injectable 12.5 Gram(s) IV Push once  dextrose 50% Injectable 25 Gram(s) IV Push once  dextrose Oral Gel 15 Gram(s) Oral once PRN  glucagon  Injectable 1 milliGRAM(s) IntraMuscular once  insulin lispro (ADMELOG) corrective regimen sliding scale   SubCutaneous three times a day before meals  insulin lispro (ADMELOG) corrective regimen sliding scale   SubCutaneous at bedtime    All Other Medications:  capsaicin HP 0.075% Cream 1 Application(s) Topical two times a day  chlorhexidine 2% Cloths 1 Application(s) Topical <User Schedule>  clotrimazole 1% Cream 1 Application(s) Topical two times a day  Dakins Solution - 1/4 Strength 1 Application(s) Topical two times a day  lidocaine   4% Patch 1 Patch Transdermal daily  lidocaine   4% Patch 1 Patch Transdermal daily  multivitamin 1 Tablet(s) Oral daily  mupirocin 2% Ointment 1 Application(s) Topical two times a day  sevelamer carbonate 800 milliGRAM(s) Oral three times a day with meals          Vital Signs Last 24 Hrs  T(C): 36.8 (16 Apr 2025 04:00), Max: 37.1 (15 Apr 2025 20:00)  T(F): 98.3 (16 Apr 2025 04:00), Max: 98.7 (15 Apr 2025 20:00)  HR: 109 (16 Apr 2025 10:47) (94 - 113)  BP: 155/93 (16 Apr 2025 05:46) (121/69 - 159/96)  BP(mean): 108 (16 Apr 2025 05:46) (45 - 114)  RR: 15 (16 Apr 2025 04:00) (15 - 23)  SpO2: 100% (16 Apr 2025 10:47) (93% - 100%)    Parameters below as of 16 Apr 2025 04:00  Patient On (Oxygen Delivery Method): nasal cannula  O2 Flow (L/min): 2      PAIN SCORE:         SCALE USED: (1-10 VNRS)             PHYSICAL EXAM:    GENERAL: NAD, well-groomed, well-developed      LABS:                          8.0    9.48  )-----------( 471      ( 16 Apr 2025 05:00 )             21.7     04-16    143  |  100  |  11  ----------------------------<  80  3.9   |  31  |  1.40[H]    Ca    9.7      16 Apr 2025 05:00  Phos  5.5     04-16  Mg     1.80     04-16      PT/INR - ( 15 Apr 2025 05:45 )   PT: 15.0 sec;   INR: 1.30 ratio         PTT - ( 16 Apr 2025 05:00 )  PTT:31.0 sec  Urinalysis Basic - ( 16 Apr 2025 05:00 )    Color: x / Appearance: x / SG: x / pH: x  Gluc: 80 mg/dL / Ketone: x  / Bili: x / Urobili: x   Blood: x / Protein: x / Nitrite: x   Leuk Esterase: x / RBC: x / WBC x   Sq Epi: x / Non Sq Epi: x / Bacteria: x    RECOMMENDATIONS;  1) Recommend increasing PO Cymbalta to 60mg daily. Hold for over sedation.  2) Recommend discontinuing current orders for IV Dilaudid instead order:  PO Oxycodone 10mg q6h PRN for moderate to severe pain. Hold for over sedation. Not to be given within one hour of any other immediate acting opioid.  IV Dilaudid 0.5mg q12h prn for dressing changes and breakthrough pain. Hold for over sedation. Not to be given within one hour of any other immediate acting opioid.   Recommend weaning IV Dilaudid off prior to discharge.  Discussed patient with chronic pain management MD Dr. Barth who agrees with the above plan.  Pain service to sign off. Please call chronic pain service if further assistance needed with pain management. Chief Complaint: Feet pain     HPI:    This is a 40 y/o M w/ PMHx AF (not on AC), HTN, BEA, pseudotumor cerebri s/p LP in 2024, initially presented to [place] on 2/24, SOB, LE edema with URI symptoms and sick contacts, noted to have severe ARDS, intubated however still hypoxia, cannulated for VV ECMO on 2/25, transferred to [facility] on 2/25, started on empiric Vanco, Zosyn for multifocal PNA, abdominal wall cellulitis, s/p trach placement by CT surgery on 3/7, ECMO decannulated on 3/7. Zosyn held on 3/9.  C/b fevers on 3/10, restarted on Zosyn, transferred to [facility] on 3/13, Bcx now w/ yeast.    #Sacral ulcer, with extensive tunneling, no OM on MRI, however c/f OM on WBC scan  #Candida albicans fungemia   #Fevers   #Multifocal PNA, abdominal wall cellulitis s/p Vanco/Zosyn  #ARDS, hypoxic respiratory failure requiring VV EMCO 2/2 multifocal PNA? s/p decannulation on 3/7    [place] labs notable for MRSA PCR -, Fluvid -, urine legionella -, sputum gram stain  with GPC and GPR    CTA chest on 2/24 negative for pulmonary embolism, notable for patchy bilateral lower lobe opacities, hepatomegaly, mild ascites, edema/induration of the abdominal pannus.    LE duplex on 2/25 negative for DVT    TTE on 2/25 showed LV EF 61%, enlarged RV with TAPSE 2.1cm, ePASP at least 49 (Patient had 10/2024 TTE with normal LV and RV with ePASP 14).    Only home med is Lasix. Not on acetazolamide for pseudotumor or on Wegovy (was pending auth) (26 Feb 2025 01:48)    Reconsulted for feet pain. Patient seen at bedside. Reports " burning, shooting, pins and needles" pain located on the feet. Patient states he has similar pain on the legs which improved now. Denies pain at the sacral wound. States most of the pain is on his feet. Patient states the pin was better controlled when he was on PO Dilaudid. IV Dilaudid helps with the dressing changes currently per patient. Not getting much relief with Gabapentin. Tolerating regular diet well.  PAST MEDICAL & SURGICAL HISTORY:  HTN (hypertension)      Atrial fibrillation  paroxysmal      Papilledema of both eyes      Chronic sinusitis      Morbid obesity      No significant past surgical history          FAMILY HISTORY:      SOCIAL HISTORY:  [ ] Denies Smoking, Alcohol, or Drug Use    Allergies    No Known Allergies    Intolerances        PAIN MEDICATIONS:  acetaminophen     Tablet .. 650 milliGRAM(s) Oral every 6 hours PRN  cyclobenzaprine 5 milliGRAM(s) Oral three times a day PRN  DULoxetine 30 milliGRAM(s) Oral daily  gabapentin 900 milliGRAM(s) Oral every 8 hours  HYDROmorphone  Injectable 0.5 milliGRAM(s) IV Push every 6 hours PRN  melatonin 3 milliGRAM(s) Oral at bedtime  QUEtiapine 25 milliGRAM(s) Oral at bedtime      Heme:  heparin   Injectable 99133 Unit(s) IV Push every 6 hours PRN  heparin   Injectable 5000 Unit(s) IV Push every 6 hours PRN  heparin  Infusion. 3500 Unit(s)/Hr IV Continuous <Continuous>    Antibiotics:  piperacillin/tazobactam IVPB.. 3.375 Gram(s) IV Intermittent every 8 hours    Cardiovascular:  amLODIPine   Tablet 10 milliGRAM(s) Oral daily    GI:  pantoprazole    Tablet 40 milliGRAM(s) Oral before breakfast  polyethylene glycol 3350 17 Gram(s) Oral daily  senna 2 Tablet(s) Oral at bedtime    Endocrine:  dextrose 50% Injectable 25 Gram(s) IV Push once  dextrose 50% Injectable 12.5 Gram(s) IV Push once  dextrose 50% Injectable 25 Gram(s) IV Push once  dextrose Oral Gel 15 Gram(s) Oral once PRN  glucagon  Injectable 1 milliGRAM(s) IntraMuscular once  insulin lispro (ADMELOG) corrective regimen sliding scale   SubCutaneous three times a day before meals  insulin lispro (ADMELOG) corrective regimen sliding scale   SubCutaneous at bedtime    All Other Medications:  capsaicin HP 0.075% Cream 1 Application(s) Topical two times a day  chlorhexidine 2% Cloths 1 Application(s) Topical <User Schedule>  clotrimazole 1% Cream 1 Application(s) Topical two times a day  Dakins Solution - 1/4 Strength 1 Application(s) Topical two times a day  lidocaine   4% Patch 1 Patch Transdermal daily  lidocaine   4% Patch 1 Patch Transdermal daily  multivitamin 1 Tablet(s) Oral daily  mupirocin 2% Ointment 1 Application(s) Topical two times a day  sevelamer carbonate 800 milliGRAM(s) Oral three times a day with meals          Vital Signs Last 24 Hrs  T(C): 36.8 (16 Apr 2025 04:00), Max: 37.1 (15 Apr 2025 20:00)  T(F): 98.3 (16 Apr 2025 04:00), Max: 98.7 (15 Apr 2025 20:00)  HR: 109 (16 Apr 2025 10:47) (94 - 113)  BP: 155/93 (16 Apr 2025 05:46) (121/69 - 159/96)  BP(mean): 108 (16 Apr 2025 05:46) (45 - 114)  RR: 15 (16 Apr 2025 04:00) (15 - 23)  SpO2: 100% (16 Apr 2025 10:47) (93% - 100%)    Parameters below as of 16 Apr 2025 04:00  Patient On (Oxygen Delivery Method): nasal cannula  O2 Flow (L/min): 2      PAIN SCORE:         SCALE USED: (1-10 VNRS)             PHYSICAL EXAM:    GENERAL: NAD, well-groomed, well-developed  Extremities: B/l feet with dressing present.    LABS:                          8.0    9.48  )-----------( 471      ( 16 Apr 2025 05:00 )             21.7     04-16    143  |  100  |  11  ----------------------------<  80  3.9   |  31  |  1.40[H]    Ca    9.7      16 Apr 2025 05:00  Phos  5.5     04-16  Mg     1.80     04-16      PT/INR - ( 15 Apr 2025 05:45 )   PT: 15.0 sec;   INR: 1.30 ratio         PTT - ( 16 Apr 2025 05:00 )  PTT:31.0 sec  Urinalysis Basic - ( 16 Apr 2025 05:00 )    Color: x / Appearance: x / SG: x / pH: x  Gluc: 80 mg/dL / Ketone: x  / Bili: x / Urobili: x   Blood: x / Protein: x / Nitrite: x   Leuk Esterase: x / RBC: x / WBC x   Sq Epi: x / Non Sq Epi: x / Bacteria: x    RECOMMENDATIONS;  1) Recommend increasing PO Cymbalta to 60mg daily. Hold for over sedation.  2) Recommend discontinuing current orders for IV Dilaudid instead order:  PO Oxycodone 10mg q6h PRN for moderate to severe pain. Hold for over sedation. Not to be given within one hour of any other immediate acting opioid.  IV Dilaudid 0.5mg q12h prn for dressing changes and breakthrough pain. Hold for over sedation. Not to be given within one hour of any other immediate acting opioid.   Recommend weaning IV Dilaudid off prior to discharge.  Discussed patient with chronic pain management MD Dr. Barth who agrees with the above plan.  Pain service to sign off. Please call chronic pain service if further assistance needed with pain management.

## 2025-04-16 NOTE — PROGRESS NOTE ADULT - SUBJECTIVE AND OBJECTIVE BOX
24h Events:  S/p PEG removal, EGD, sacral wound debridement     Subjective:   Patient seen at bedside this AM. Pain controlled.     Objective:  Vital Signs  T(C): 36.8 (04-16 @ 04:00), Max: 37.1 (04-15 @ 20:00)  HR: 94 (04-16 @ 07:31) (94 - 114)  BP: 155/93 (04-16 @ 05:46) (116/79 - 159/96)  RR: 15 (04-16 @ 04:00) (15 - 24)  SpO2: 100% (04-16 @ 07:31) (89% - 100%)  04-15-25 @ 07:01  -  04-16-25 @ 07:00  --------------------------------------------------------  IN:  Total IN: 0 mL    OUT:    Voided (mL): 1900 mL  Total OUT: 1900 mL    Total NET: -1900 mL          Physical Exam:  GEN: NAD, obese   RESP: RA  ABD: s, nd, nttp  EXTR: wwp  NEURO: awake. alert     Labs:                        8.0    9.48  )-----------( 471      ( 16 Apr 2025 05:00 )             21.7   04-16    143  |  100  |  11  ----------------------------<  80  3.9   |  31  |  1.40[H]    Ca    9.7      16 Apr 2025 05:00  Phos  5.5     04-16  Mg     1.80     04-16      CAPILLARY BLOOD GLUCOSE      POCT Blood Glucose.: 86 mg/dL (16 Apr 2025 08:30)  POCT Blood Glucose.: 98 mg/dL (15 Apr 2025 21:11)  POCT Blood Glucose.: 107 mg/dL (15 Apr 2025 17:05)  POCT Blood Glucose.: 108 mg/dL (15 Apr 2025 12:48)  POCT Blood Glucose.: 100 mg/dL (15 Apr 2025 10:41)      Imaging:

## 2025-04-16 NOTE — PROGRESS NOTE ADULT - ASSESSMENT
39M presents with R foot plantar forefoot hematoma/partial gangrene improving in appearance  - Patient seen and evaluated  - Right foot digits 4 & 5 partial thickness dry gangrene. Right foot plantar lateral forefoot wound to subQ, now drying out improving in appearance  - No source of infection concerns, no surgical concerns per podiatry  - No culture taken secondary to no acute signs of infection   - Wound care orders placed for daily dressing changes   - No acute pod intervention, local wound care only   - Wound care instruction and follow up information in discharge note provider

## 2025-04-16 NOTE — PROGRESS NOTE ADULT - SUBJECTIVE AND OBJECTIVE BOX
Patient is a 39y old  Male who presents with a chief complaint of sob (02 Mar 2025 06:19)       INTERVAL HPI/OVERNIGHT EVENTS:  Patient seen and evaluated at bedside.  Pt is resting comfortable in NAD. Denies N/V/F/C.  Pain rated at X/10    Allergies    No Known Allergies    Intolerances        Vital Signs Last 24 Hrs  T(C): 36.5 (16 Apr 2025 14:19), Max: 37.1 (15 Apr 2025 20:00)  T(F): 97.7 (16 Apr 2025 14:19), Max: 98.7 (15 Apr 2025 20:00)  HR: 107 (16 Apr 2025 15:46) (94 - 112)  BP: 145/97 (16 Apr 2025 14:19) (125/75 - 159/96)  BP(mean): 108 (16 Apr 2025 05:46) (45 - 114)  RR: 17 (16 Apr 2025 14:19) (15 - 23)  SpO2: 94% (16 Apr 2025 15:46) (94% - 100%)    Parameters below as of 16 Apr 2025 04:00  Patient On (Oxygen Delivery Method): nasal cannula  O2 Flow (L/min): 2      LABS:                        8.0    9.48  )-----------( 471      ( 16 Apr 2025 05:00 )             21.7     04-16    143  |  100  |  11  ----------------------------<  80  3.9   |  31  |  1.40[H]    Ca    9.7      16 Apr 2025 05:00  Phos  5.5     04-16  Mg     1.80     04-16      PT/INR - ( 15 Apr 2025 05:45 )   PT: 15.0 sec;   INR: 1.30 ratio         PTT - ( 16 Apr 2025 05:00 )  PTT:31.0 sec  Urinalysis Basic - ( 16 Apr 2025 05:00 )    Color: x / Appearance: x / SG: x / pH: x  Gluc: 80 mg/dL / Ketone: x  / Bili: x / Urobili: x   Blood: x / Protein: x / Nitrite: x   Leuk Esterase: x / RBC: x / WBC x   Sq Epi: x / Non Sq Epi: x / Bacteria: x      CAPILLARY BLOOD GLUCOSE      POCT Blood Glucose.: 86 mg/dL (16 Apr 2025 08:30)  POCT Blood Glucose.: 98 mg/dL (15 Apr 2025 21:11)  POCT Blood Glucose.: 107 mg/dL (15 Apr 2025 17:05)      Lower Extremity Physical Exam:  Vascular: DP 1/4 B/L, PT 0/4, B/L secondary to +3 pitting edema, CFT <3 seconds B/L, Temperature gradient warm to cool, B/L.   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 Right foot plantar lateral forefoot wound to subQ, no malodor, no pus, no acute signs of infection. Left foot digit 1,2,4 partial thickness dry gangrene. Left foot lateral plantar forefoot wound to subQ, no malodor, no pus, no acute signs of infection. improving in appearance L>R    RADIOLOGY & ADDITIONAL TESTS:

## 2025-04-17 LAB
-  AMOXICILLIN/CLAVULANIC ACID: SIGNIFICANT CHANGE UP
-  AMPICILLIN/SULBACTAM: SIGNIFICANT CHANGE UP
-  AMPICILLIN: SIGNIFICANT CHANGE UP
-  AZTREONAM: SIGNIFICANT CHANGE UP
-  CEFAZOLIN: SIGNIFICANT CHANGE UP
-  CEFEPIME: SIGNIFICANT CHANGE UP
-  CEFOXITIN: SIGNIFICANT CHANGE UP
-  CEFTRIAXONE: SIGNIFICANT CHANGE UP
-  CIPROFLOXACIN: SIGNIFICANT CHANGE UP
-  ERTAPENEM: SIGNIFICANT CHANGE UP
-  LEVOFLOXACIN: SIGNIFICANT CHANGE UP
-  MEROPENEM: SIGNIFICANT CHANGE UP
-  PIPERACILLIN/TAZOBACTAM: SIGNIFICANT CHANGE UP
-  TRIMETHOPRIM/SULFAMETHOXAZOLE: SIGNIFICANT CHANGE UP
ANION GAP SERPL CALC-SCNC: 16 MMOL/L — HIGH (ref 7–14)
APTT BLD: 31.4 SEC — SIGNIFICANT CHANGE UP (ref 24.5–35.6)
BASE EXCESS BLDA CALC-SCNC: 9.4 MMOL/L — HIGH (ref -2–3)
BASOPHILS # BLD AUTO: 0.03 K/UL — SIGNIFICANT CHANGE UP (ref 0–0.2)
BASOPHILS NFR BLD AUTO: 0.3 % — SIGNIFICANT CHANGE UP (ref 0–2)
BUN SERPL-MCNC: 14 MG/DL — SIGNIFICANT CHANGE UP (ref 7–23)
CALCIUM SERPL-MCNC: 10.1 MG/DL — SIGNIFICANT CHANGE UP (ref 8.4–10.5)
CHLORIDE SERPL-SCNC: 96 MMOL/L — LOW (ref 98–107)
CO2 BLDA-SCNC: 37 MMOL/L — HIGH (ref 19–24)
CO2 SERPL-SCNC: 25 MMOL/L — SIGNIFICANT CHANGE UP (ref 22–31)
CREAT SERPL-MCNC: 1.34 MG/DL — HIGH (ref 0.5–1.3)
CULTURE RESULTS: ABNORMAL
EGFR: 70 ML/MIN/1.73M2 — SIGNIFICANT CHANGE UP
EGFR: 70 ML/MIN/1.73M2 — SIGNIFICANT CHANGE UP
EOSINOPHIL # BLD AUTO: 0.21 K/UL — SIGNIFICANT CHANGE UP (ref 0–0.5)
EOSINOPHIL NFR BLD AUTO: 2.3 % — SIGNIFICANT CHANGE UP (ref 0–6)
GLUCOSE BLDC GLUCOMTR-MCNC: 100 MG/DL — HIGH (ref 70–99)
GLUCOSE BLDC GLUCOMTR-MCNC: 107 MG/DL — HIGH (ref 70–99)
GLUCOSE BLDC GLUCOMTR-MCNC: 129 MG/DL — HIGH (ref 70–99)
GLUCOSE BLDC GLUCOMTR-MCNC: 97 MG/DL — SIGNIFICANT CHANGE UP (ref 70–99)
GLUCOSE SERPL-MCNC: 89 MG/DL — SIGNIFICANT CHANGE UP (ref 70–99)
HCO3 BLDA-SCNC: 35 MMOL/L — HIGH (ref 21–28)
HCT VFR BLD CALC: 34.1 % — LOW (ref 39–50)
HGB BLD-MCNC: 10.1 G/DL — LOW (ref 13–17)
HOROWITZ INDEX BLDA+IHG-RTO: 30 — SIGNIFICANT CHANGE UP
IANC: 6.08 K/UL — SIGNIFICANT CHANGE UP (ref 1.8–7.4)
IMM GRANULOCYTES NFR BLD AUTO: 0.3 % — SIGNIFICANT CHANGE UP (ref 0–0.9)
LYMPHOCYTES # BLD AUTO: 1.83 K/UL — SIGNIFICANT CHANGE UP (ref 1–3.3)
LYMPHOCYTES # BLD AUTO: 20.2 % — SIGNIFICANT CHANGE UP (ref 13–44)
MAGNESIUM SERPL-MCNC: 1.8 MG/DL — SIGNIFICANT CHANGE UP (ref 1.6–2.6)
MCHC RBC-ENTMCNC: 27.2 PG — SIGNIFICANT CHANGE UP (ref 27–34)
MCHC RBC-ENTMCNC: 29.6 G/DL — LOW (ref 32–36)
MCV RBC AUTO: 91.7 FL — SIGNIFICANT CHANGE UP (ref 80–100)
METHOD TYPE: SIGNIFICANT CHANGE UP
MONOCYTES # BLD AUTO: 0.89 K/UL — SIGNIFICANT CHANGE UP (ref 0–0.9)
MONOCYTES NFR BLD AUTO: 9.8 % — SIGNIFICANT CHANGE UP (ref 2–14)
NEUTROPHILS # BLD AUTO: 6.08 K/UL — SIGNIFICANT CHANGE UP (ref 1.8–7.4)
NEUTROPHILS NFR BLD AUTO: 67.1 % — SIGNIFICANT CHANGE UP (ref 43–77)
NRBC # BLD AUTO: 0 K/UL — SIGNIFICANT CHANGE UP (ref 0–0)
NRBC # FLD: 0 K/UL — SIGNIFICANT CHANGE UP (ref 0–0)
NRBC BLD AUTO-RTO: 0 /100 WBCS — SIGNIFICANT CHANGE UP (ref 0–0)
ORGANISM # SPEC MICROSCOPIC CNT: ABNORMAL
ORGANISM # SPEC MICROSCOPIC CNT: ABNORMAL
PCO2 BLDA: 52 MMHG — HIGH (ref 35–48)
PH BLDA: 7.44 — SIGNIFICANT CHANGE UP (ref 7.35–7.45)
PHOSPHATE SERPL-MCNC: 5.8 MG/DL — HIGH (ref 2.5–4.5)
PLATELET # BLD AUTO: 419 K/UL — HIGH (ref 150–400)
PO2 BLDA: 149 MMHG — HIGH (ref 83–108)
POTASSIUM SERPL-MCNC: 4.7 MMOL/L — SIGNIFICANT CHANGE UP (ref 3.5–5.3)
POTASSIUM SERPL-SCNC: 4.7 MMOL/L — SIGNIFICANT CHANGE UP (ref 3.5–5.3)
RBC # BLD: 3.72 M/UL — LOW (ref 4.2–5.8)
RBC # FLD: 23.8 % — HIGH (ref 10.3–14.5)
SAO2 % BLDA: 100 % — HIGH (ref 94–98)
SODIUM SERPL-SCNC: 137 MMOL/L — SIGNIFICANT CHANGE UP (ref 135–145)
SPECIMEN SOURCE: SIGNIFICANT CHANGE UP
WBC # BLD: 9.07 K/UL — SIGNIFICANT CHANGE UP (ref 3.8–10.5)
WBC # FLD AUTO: 9.07 K/UL — SIGNIFICANT CHANGE UP (ref 3.8–10.5)

## 2025-04-17 PROCEDURE — G0545: CPT

## 2025-04-17 PROCEDURE — 99233 SBSQ HOSP IP/OBS HIGH 50: CPT

## 2025-04-17 PROCEDURE — 99232 SBSQ HOSP IP/OBS MODERATE 35: CPT

## 2025-04-17 RX ORDER — HYDROMORPHONE/SOD CHLOR,ISO/PF 2 MG/10 ML
0.5 SYRINGE (ML) INJECTION EVERY 12 HOURS
Refills: 0 | Status: DISCONTINUED | OUTPATIENT
Start: 2025-04-17 | End: 2025-04-21

## 2025-04-17 RX ORDER — APIXABAN 2.5 MG/1
5 TABLET, FILM COATED ORAL EVERY 12 HOURS
Refills: 0 | Status: DISCONTINUED | OUTPATIENT
Start: 2025-04-17 | End: 2025-04-25

## 2025-04-17 RX ORDER — OXYCODONE HYDROCHLORIDE 30 MG/1
10 TABLET ORAL EVERY 6 HOURS
Refills: 0 | Status: DISCONTINUED | OUTPATIENT
Start: 2025-04-17 | End: 2025-04-21

## 2025-04-17 RX ADMIN — APIXABAN 5 MILLIGRAM(S): 2.5 TABLET, FILM COATED ORAL at 14:42

## 2025-04-17 RX ADMIN — SEVELAMER HYDROCHLORIDE 800 MILLIGRAM(S): 800 TABLET ORAL at 14:18

## 2025-04-17 RX ADMIN — LIDOCAINE HYDROCHLORIDE 1 PATCH: 20 JELLY TOPICAL at 19:00

## 2025-04-17 RX ADMIN — Medication 1 APPLICATION(S): at 14:19

## 2025-04-17 RX ADMIN — Medication 25 GRAM(S): at 22:08

## 2025-04-17 RX ADMIN — GABAPENTIN 900 MILLIGRAM(S): 400 CAPSULE ORAL at 05:04

## 2025-04-17 RX ADMIN — CLOTRIMAZOLE 1 APPLICATION(S): 1 CREAM TOPICAL at 05:06

## 2025-04-17 RX ADMIN — DULOXETINE 60 MILLIGRAM(S): 20 CAPSULE, DELAYED RELEASE ORAL at 11:08

## 2025-04-17 RX ADMIN — LIDOCAINE HYDROCHLORIDE 1 PATCH: 20 JELLY TOPICAL at 11:09

## 2025-04-17 RX ADMIN — OXYCODONE HYDROCHLORIDE 10 MILLIGRAM(S): 30 TABLET ORAL at 22:07

## 2025-04-17 RX ADMIN — AMLODIPINE BESYLATE 10 MILLIGRAM(S): 10 TABLET ORAL at 05:04

## 2025-04-17 RX ADMIN — Medication 3 MILLIGRAM(S): at 22:07

## 2025-04-17 RX ADMIN — Medication 1 APPLICATION(S): at 05:06

## 2025-04-17 RX ADMIN — MUPIROCIN CALCIUM 1 APPLICATION(S): 20 CREAM TOPICAL at 18:09

## 2025-04-17 RX ADMIN — GABAPENTIN 900 MILLIGRAM(S): 400 CAPSULE ORAL at 22:07

## 2025-04-17 RX ADMIN — Medication 2 TABLET(S): at 22:08

## 2025-04-17 RX ADMIN — Medication 1 APPLICATION(S): at 01:01

## 2025-04-17 RX ADMIN — GABAPENTIN 900 MILLIGRAM(S): 400 CAPSULE ORAL at 14:19

## 2025-04-17 RX ADMIN — OXYCODONE HYDROCHLORIDE 10 MILLIGRAM(S): 30 TABLET ORAL at 11:12

## 2025-04-17 RX ADMIN — Medication 25 GRAM(S): at 05:03

## 2025-04-17 RX ADMIN — SEVELAMER HYDROCHLORIDE 800 MILLIGRAM(S): 800 TABLET ORAL at 18:08

## 2025-04-17 RX ADMIN — Medication 25 GRAM(S): at 14:18

## 2025-04-17 RX ADMIN — SODIUM HYPOCHLORITE 1 APPLICATION(S): 0.12 SOLUTION TOPICAL at 05:06

## 2025-04-17 RX ADMIN — Medication 0.5 MILLIGRAM(S): at 14:43

## 2025-04-17 RX ADMIN — Medication 40 MILLIGRAM(S): at 05:04

## 2025-04-17 RX ADMIN — OXYCODONE HYDROCHLORIDE 10 MILLIGRAM(S): 30 TABLET ORAL at 22:37

## 2025-04-17 RX ADMIN — CLOTRIMAZOLE 1 APPLICATION(S): 1 CREAM TOPICAL at 18:09

## 2025-04-17 RX ADMIN — Medication 0.5 MILLIGRAM(S): at 07:32

## 2025-04-17 RX ADMIN — MUPIROCIN CALCIUM 1 APPLICATION(S): 20 CREAM TOPICAL at 05:07

## 2025-04-17 RX ADMIN — Medication 1 TABLET(S): at 11:09

## 2025-04-17 RX ADMIN — QUETIAPINE FUMARATE 25 MILLIGRAM(S): 25 TABLET ORAL at 22:08

## 2025-04-17 RX ADMIN — SODIUM HYPOCHLORITE 1 APPLICATION(S): 0.12 SOLUTION TOPICAL at 18:09

## 2025-04-17 RX ADMIN — Medication 1 APPLICATION(S): at 05:07

## 2025-04-17 RX ADMIN — SEVELAMER HYDROCHLORIDE 800 MILLIGRAM(S): 800 TABLET ORAL at 08:06

## 2025-04-17 NOTE — PROGRESS NOTE ADULT - ASSESSMENT
This is a 40 y/o M w/ PMHx AF (not on AC), HTN, BEA, pseudotumor cerebri s/p LP in 2024, initially presented to [facility] on 2/24, SOB, LE edema with URI symptoms and sick contacts, noted to have severe ARDS, intubated however still hypoxia, cannulated for VV ECMO on 2/25, transferred to [facility] on 2/25, started on empiric Vanco, Zosyn for multifocal PNA, abdominal wall cellulitis, s/p trach placement by CT surgery on 3/7, ECMO decannulated on 3/7. Zosyn held on 3/9.  C/b fevers on 3/10, restarted on Zosyn, transferred to [facility] on 3/13, Bcx now w/ yeast.    #Sacral ulcer, with extensive tunneling, no OM on MRI, however c/f OM on WBC scan  #Candida albicans fungemia   #Fevers   #Multifocal PNA, abdominal wall cellulitis s/p Vanco/Zosyn  #ARDS, hypoxic respiratory failure requiring VV EMCO 2/2 multifocal PNA? s/p decannulation on 3/7    Overall, 40 y/o M w/ PMHx AF (not on AC), HTN, BEA, pseudotumor cerebri s/p LP in 2024 admitted to [facility] on 2/26 for ARDS, hypoxic respiratory failure requiring VV EMCO 2/2 multifocal PNA? s/p decannulation on 3/7, c/b abdominal wall cellulitis s/p Vancomycin/Zosyn, s/p trach on 3/7, in the setting of persistent fevers despite Zosyn, BCx now w/ yeast, Candida albicans   Unclear source for yeast in BCx, pt had all central lines removed on 3/7, not getting TPN, no abdominal pain on exam, PEG site well appearing.     CT A/P w/o clear abdominal source of fungemia, possibly 2/2 abdominal wall cellulitis? TTE w/o IE.  BCx 3/18 1/2 positive, 3/20 NGTD x 2.     Pt with worsening sepsis on 3/30, febrile to 103, WBC 16.   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   Pt with deep tunneled wound to left, no drainage, malodor per wound care.   CT A/P w/ b/l pulmonary opacities but improved, no sacral abscess   WBC scan with soft tissue infection R side of pelvis, intense activity in R iliac bone possible OM. Lateral R forefoot with soft tissue infection, possible OM?  Wound examined with wound care, large R buttocks soft tissue ulcer, does not look infected, however deep tunneling with murky fluid, likely source of infection   MRI w/o OM, however given WBC scan findings, will still treat for 6 weeks     S/p PEG removal and sacral ulcer debridement, gram stain w/ GNR    Recommendations;   1. Continue with Zosyn 3.375 g 8, plan on 6 week course for possible OM until 5/11. If leaving for facility would change to Cefepime/Flagyl or Ertapenem   2. S/p Caspofungin 70 mg daily, 4 week course ended 4/15  3. F/u OR studies, Providencia, bacteroides      Thank you for consulting us and involving us in the management of this patient's case. In addition to reviewing history, imaging, documents, labs, microbiology, and infection control strategies and potential issues.     ID will continue to follow    Shailesh Owens M.D.  Attending Physician  Division of Infectious Diseases  Department of Medicine    Please contact through MS Teams message.  Office: [phone] (after 5 PM or weekend)

## 2025-04-17 NOTE — PROGRESS NOTE ADULT - SUBJECTIVE AND OBJECTIVE BOX
CHIEF COMPLAINT:Patient is a 39y old  Male who presents with a chief complaint of sob (02 Mar 2025 06:19)      INTERVAL EVENTS:     ROS: Seen by bedside during AM rounds     OBJECTIVE:  ICU Vital Signs Last 24 Hrs  T(C): 36.2 (17 Apr 2025 04:00), Max: 36.5 (16 Apr 2025 14:19)  T(F): 97.2 (17 Apr 2025 04:00), Max: 97.7 (16 Apr 2025 14:19)  HR: 100 (17 Apr 2025 04:00) (93 - 109)  BP: 141/85 (17 Apr 2025 04:00) (141/85 - 160/95)  BP(mean): 99 (17 Apr 2025 04:00) (99 - 110)  ABP: --  ABP(mean): --  RR: 16 (17 Apr 2025 04:00) (16 - 20)  SpO2: 100% (17 Apr 2025 04:00) (94% - 100%)    O2 Parameters below as of 17 Apr 2025 04:00  Patient On (Oxygen Delivery Method): BiPAP/CPAP              04-15 @ 07:01  -  04-16 @ 07:00  --------------------------------------------------------  IN: 400 mL / OUT: 1900 mL / NET: -1500 mL    04-16 @ 07:01 - 04-17 @ 06:52  --------------------------------------------------------  IN: 0 mL / OUT: 700 mL / NET: -700 mL      CAPILLARY BLOOD GLUCOSE      POCT Blood Glucose.: 104 mg/dL (16 Apr 2025 20:53)      PHYSICAL EXAM:  General:   HEENT:   Lymph Nodes:  Neck:   Respiratory:   Cardiovascular:   Abdomen:   Extremities:   Skin:   Neurological:  Psychiatry:        HOSPITAL MEDICATIONS:  MEDICATIONS  (STANDING):  amLODIPine   Tablet 10 milliGRAM(s) Oral daily  capsaicin HP 0.075% Cream 1 Application(s) Topical four times a day  chlorhexidine 2% Cloths 1 Application(s) Topical <User Schedule>  clotrimazole 1% Cream 1 Application(s) Topical two times a day  Dakins Solution - 1/4 Strength 1 Application(s) Topical two times a day  dextrose 50% Injectable 25 Gram(s) IV Push once  dextrose 50% Injectable 12.5 Gram(s) IV Push once  dextrose 50% Injectable 25 Gram(s) IV Push once  DULoxetine 60 milliGRAM(s) Oral daily  gabapentin 900 milliGRAM(s) Oral every 8 hours  glucagon  Injectable 1 milliGRAM(s) IntraMuscular once  heparin  Infusion. 3500 Unit(s)/Hr (35 mL/Hr) IV Continuous <Continuous>  insulin lispro (ADMELOG) corrective regimen sliding scale   SubCutaneous three times a day before meals  insulin lispro (ADMELOG) corrective regimen sliding scale   SubCutaneous at bedtime  lidocaine   4% Patch 1 Patch Transdermal daily  lidocaine   4% Patch 1 Patch Transdermal daily  melatonin 3 milliGRAM(s) Oral at bedtime  multivitamin 1 Tablet(s) Oral daily  mupirocin 2% Ointment 1 Application(s) Topical two times a day  pantoprazole    Tablet 40 milliGRAM(s) Oral before breakfast  piperacillin/tazobactam IVPB.. 3.375 Gram(s) IV Intermittent every 8 hours  polyethylene glycol 3350 17 Gram(s) Oral daily  QUEtiapine 25 milliGRAM(s) Oral at bedtime  senna 2 Tablet(s) Oral at bedtime  sevelamer carbonate 800 milliGRAM(s) Oral three times a day with meals    MEDICATIONS  (PRN):  acetaminophen     Tablet .. 650 milliGRAM(s) Oral every 6 hours PRN Temp greater or equal to 38C (100.4F), Mild Pain (1 - 3), Moderate Pain (4 - 6)  albuterol/ipratropium for Nebulization 3 milliLiter(s) Nebulizer every 6 hours PRN Shortness of Breath and/or Wheezing  cyclobenzaprine 5 milliGRAM(s) Oral three times a day PRN Muscle Spasm  dextrose Oral Gel 15 Gram(s) Oral once PRN Blood Glucose LESS THAN 70 milliGRAM(s)/deciliter  heparin   Injectable 85704 Unit(s) IV Push every 6 hours PRN For aPTT less than 40  heparin   Injectable 5000 Unit(s) IV Push every 6 hours PRN For aPTT between 40 - 57  HYDROmorphone  Injectable 0.5 milliGRAM(s) IV Push every 12 hours PRN dressing changes and severe breakthrough  oxyCODONE    IR 10 milliGRAM(s) Oral every 6 hours PRN Severe Pain (7 - 10)      LABS:                        8.0    9.48  )-----------( 471      ( 16 Apr 2025 05:00 )             21.7     04-16    143  |  100  |  11  ----------------------------<  80  3.9   |  31  |  1.40[H]    Ca    9.7      16 Apr 2025 05:00  Phos  5.5     04-16  Mg     1.80     04-16      PTT - ( 16 Apr 2025 18:30 )  PTT:61.7 sec  Urinalysis Basic - ( 16 Apr 2025 05:00 )    Color: x / Appearance: x / SG: x / pH: x  Gluc: 80 mg/dL / Ketone: x  / Bili: x / Urobili: x   Blood: x / Protein: x / Nitrite: x   Leuk Esterase: x / RBC: x / WBC x   Sq Epi: x / Non Sq Epi: x / Bacteria: x         CHIEF COMPLAINT:Patient is a 39y old  Male who presents with a chief complaint of sob (02 Mar 2025 06:19)      INTERVAL EVENTS:     ROS: Seen by bedside during AM rounds     OBJECTIVE:  ICU Vital Signs Last 24 Hrs  T(C): 36.2 (17 Apr 2025 04:00), Max: 36.5 (16 Apr 2025 14:19)  T(F): 97.2 (17 Apr 2025 04:00), Max: 97.7 (16 Apr 2025 14:19)  HR: 100 (17 Apr 2025 04:00) (93 - 109)  BP: 141/85 (17 Apr 2025 04:00) (141/85 - 160/95)  BP(mean): 99 (17 Apr 2025 04:00) (99 - 110)  ABP: --  ABP(mean): --  RR: 16 (17 Apr 2025 04:00) (16 - 20)  SpO2: 100% (17 Apr 2025 04:00) (94% - 100%)    O2 Parameters below as of 17 Apr 2025 04:00  Patient On (Oxygen Delivery Method): BiPAP/CPAP              04-15 @ 07:01  -  04-16 @ 07:00  --------------------------------------------------------  IN: 400 mL / OUT: 1900 mL / NET: -1500 mL    04-16 @ 07:01  -  04-17 @ 06:52  --------------------------------------------------------  IN: 0 mL / OUT: 700 mL / NET: -700 mL      CAPILLARY BLOOD GLUCOSE      POCT Blood Glucose.: 104 mg/dL (16 Apr 2025 20:53)      PHYSICAL EXAM:  General: NAD   Neck: trachea midline. No resp distress.   Cards: S1/S2, no murmurs   Pulm: CTA bilaterally. No wheezes.   Abdomen: Abdomen morbidly obese. Soft, NTND. Preexisting PEG tube stoma site covered with gauze dressing.   Extremities: Scant b/l LE edema. Extremities warm to touch.  Neurology: AOx3. 5/5 motor strength b/l UE. Significant weakness b/l LE. Sensation intact b/l feet to light touch. Follows basic commands.   Skin: warm to touch, color appropriate for ethnicity. Refer to RN assessment for further details.        HOSPITAL MEDICATIONS:  MEDICATIONS  (STANDING):  amLODIPine   Tablet 10 milliGRAM(s) Oral daily  capsaicin HP 0.075% Cream 1 Application(s) Topical four times a day  chlorhexidine 2% Cloths 1 Application(s) Topical <User Schedule>  clotrimazole 1% Cream 1 Application(s) Topical two times a day  Dakins Solution - 1/4 Strength 1 Application(s) Topical two times a day  dextrose 50% Injectable 25 Gram(s) IV Push once  dextrose 50% Injectable 12.5 Gram(s) IV Push once  dextrose 50% Injectable 25 Gram(s) IV Push once  DULoxetine 60 milliGRAM(s) Oral daily  gabapentin 900 milliGRAM(s) Oral every 8 hours  glucagon  Injectable 1 milliGRAM(s) IntraMuscular once  heparin  Infusion. 3500 Unit(s)/Hr (35 mL/Hr) IV Continuous <Continuous>  insulin lispro (ADMELOG) corrective regimen sliding scale   SubCutaneous three times a day before meals  insulin lispro (ADMELOG) corrective regimen sliding scale   SubCutaneous at bedtime  lidocaine   4% Patch 1 Patch Transdermal daily  lidocaine   4% Patch 1 Patch Transdermal daily  melatonin 3 milliGRAM(s) Oral at bedtime  multivitamin 1 Tablet(s) Oral daily  mupirocin 2% Ointment 1 Application(s) Topical two times a day  pantoprazole    Tablet 40 milliGRAM(s) Oral before breakfast  piperacillin/tazobactam IVPB.. 3.375 Gram(s) IV Intermittent every 8 hours  polyethylene glycol 3350 17 Gram(s) Oral daily  QUEtiapine 25 milliGRAM(s) Oral at bedtime  senna 2 Tablet(s) Oral at bedtime  sevelamer carbonate 800 milliGRAM(s) Oral three times a day with meals    MEDICATIONS  (PRN):  acetaminophen     Tablet .. 650 milliGRAM(s) Oral every 6 hours PRN Temp greater or equal to 38C (100.4F), Mild Pain (1 - 3), Moderate Pain (4 - 6)  albuterol/ipratropium for Nebulization 3 milliLiter(s) Nebulizer every 6 hours PRN Shortness of Breath and/or Wheezing  cyclobenzaprine 5 milliGRAM(s) Oral three times a day PRN Muscle Spasm  dextrose Oral Gel 15 Gram(s) Oral once PRN Blood Glucose LESS THAN 70 milliGRAM(s)/deciliter  heparin   Injectable 68632 Unit(s) IV Push every 6 hours PRN For aPTT less than 40  heparin   Injectable 5000 Unit(s) IV Push every 6 hours PRN For aPTT between 40 - 57  HYDROmorphone  Injectable 0.5 milliGRAM(s) IV Push every 12 hours PRN dressing changes and severe breakthrough  oxyCODONE    IR 10 milliGRAM(s) Oral every 6 hours PRN Severe Pain (7 - 10)      LABS:                        8.0    9.48  )-----------( 471      ( 16 Apr 2025 05:00 )             21.7     04-16    143  |  100  |  11  ----------------------------<  80  3.9   |  31  |  1.40[H]    Ca    9.7      16 Apr 2025 05:00  Phos  5.5     04-16  Mg     1.80     04-16      PTT - ( 16 Apr 2025 18:30 )  PTT:61.7 sec  Urinalysis Basic - ( 16 Apr 2025 05:00 )    Color: x / Appearance: x / SG: x / pH: x  Gluc: 80 mg/dL / Ketone: x  / Bili: x / Urobili: x   Blood: x / Protein: x / Nitrite: x   Leuk Esterase: x / RBC: x / WBC x   Sq Epi: x / Non Sq Epi: x / Bacteria: x         CHIEF COMPLAINT:Patient is a 39y old  Male who presents with a chief complaint of sob (02 Mar 2025 06:19)      INTERVAL EVENTS: no acute overnight events noted.     ROS: Seen by bedside during AM rounds     OBJECTIVE:  ICU Vital Signs Last 24 Hrs  T(C): 36.2 (17 Apr 2025 04:00), Max: 36.5 (16 Apr 2025 14:19)  T(F): 97.2 (17 Apr 2025 04:00), Max: 97.7 (16 Apr 2025 14:19)  HR: 100 (17 Apr 2025 04:00) (93 - 109)  BP: 141/85 (17 Apr 2025 04:00) (141/85 - 160/95)  BP(mean): 99 (17 Apr 2025 04:00) (99 - 110)  ABP: --  ABP(mean): --  RR: 16 (17 Apr 2025 04:00) (16 - 20)  SpO2: 100% (17 Apr 2025 04:00) (94% - 100%)    O2 Parameters below as of 17 Apr 2025 04:00  Patient On (Oxygen Delivery Method): BiPAP/CPAP              04-15 @ 07:01  -  04-16 @ 07:00  --------------------------------------------------------  IN: 400 mL / OUT: 1900 mL / NET: -1500 mL    04-16 @ 07:01 - 04-17 @ 06:52  --------------------------------------------------------  IN: 0 mL / OUT: 700 mL / NET: -700 mL      CAPILLARY BLOOD GLUCOSE      POCT Blood Glucose.: 104 mg/dL (16 Apr 2025 20:53)      PHYSICAL EXAM:  General: NAD   Neck: trachea midline. No resp distress.   Cards: S1/S2, no murmurs   Pulm: CTA bilaterally. No wheezes.   Abdomen: Abdomen morbidly obese. Soft, NTND. Preexisting PEG tube stoma site covered with gauze dressing.   Extremities: Scant b/l LE edema. Extremities warm to touch.  Neurology: AOx3. 5/5 motor strength b/l UE. Significant weakness b/l LE. Sensation intact b/l feet to light touch. Follows basic commands.   Skin: warm to touch, color appropriate for ethnicity. Refer to RN assessment for further details.        HOSPITAL MEDICATIONS:  MEDICATIONS  (STANDING):  amLODIPine   Tablet 10 milliGRAM(s) Oral daily  capsaicin HP 0.075% Cream 1 Application(s) Topical four times a day  chlorhexidine 2% Cloths 1 Application(s) Topical <User Schedule>  clotrimazole 1% Cream 1 Application(s) Topical two times a day  Dakins Solution - 1/4 Strength 1 Application(s) Topical two times a day  dextrose 50% Injectable 25 Gram(s) IV Push once  dextrose 50% Injectable 12.5 Gram(s) IV Push once  dextrose 50% Injectable 25 Gram(s) IV Push once  DULoxetine 60 milliGRAM(s) Oral daily  gabapentin 900 milliGRAM(s) Oral every 8 hours  glucagon  Injectable 1 milliGRAM(s) IntraMuscular once  heparin  Infusion. 3500 Unit(s)/Hr (35 mL/Hr) IV Continuous <Continuous>  insulin lispro (ADMELOG) corrective regimen sliding scale   SubCutaneous three times a day before meals  insulin lispro (ADMELOG) corrective regimen sliding scale   SubCutaneous at bedtime  lidocaine   4% Patch 1 Patch Transdermal daily  lidocaine   4% Patch 1 Patch Transdermal daily  melatonin 3 milliGRAM(s) Oral at bedtime  multivitamin 1 Tablet(s) Oral daily  mupirocin 2% Ointment 1 Application(s) Topical two times a day  pantoprazole    Tablet 40 milliGRAM(s) Oral before breakfast  piperacillin/tazobactam IVPB.. 3.375 Gram(s) IV Intermittent every 8 hours  polyethylene glycol 3350 17 Gram(s) Oral daily  QUEtiapine 25 milliGRAM(s) Oral at bedtime  senna 2 Tablet(s) Oral at bedtime  sevelamer carbonate 800 milliGRAM(s) Oral three times a day with meals    MEDICATIONS  (PRN):  acetaminophen     Tablet .. 650 milliGRAM(s) Oral every 6 hours PRN Temp greater or equal to 38C (100.4F), Mild Pain (1 - 3), Moderate Pain (4 - 6)  albuterol/ipratropium for Nebulization 3 milliLiter(s) Nebulizer every 6 hours PRN Shortness of Breath and/or Wheezing  cyclobenzaprine 5 milliGRAM(s) Oral three times a day PRN Muscle Spasm  dextrose Oral Gel 15 Gram(s) Oral once PRN Blood Glucose LESS THAN 70 milliGRAM(s)/deciliter  heparin   Injectable 99053 Unit(s) IV Push every 6 hours PRN For aPTT less than 40  heparin   Injectable 5000 Unit(s) IV Push every 6 hours PRN For aPTT between 40 - 57  HYDROmorphone  Injectable 0.5 milliGRAM(s) IV Push every 12 hours PRN dressing changes and severe breakthrough  oxyCODONE    IR 10 milliGRAM(s) Oral every 6 hours PRN Severe Pain (7 - 10)      LABS:                        8.0    9.48  )-----------( 471      ( 16 Apr 2025 05:00 )             21.7     04-16    143  |  100  |  11  ----------------------------<  80  3.9   |  31  |  1.40[H]    Ca    9.7      16 Apr 2025 05:00  Phos  5.5     04-16  Mg     1.80     04-16      PTT - ( 16 Apr 2025 18:30 )  PTT:61.7 sec  Urinalysis Basic - ( 16 Apr 2025 05:00 )    Color: x / Appearance: x / SG: x / pH: x  Gluc: 80 mg/dL / Ketone: x  / Bili: x / Urobili: x   Blood: x / Protein: x / Nitrite: x   Leuk Esterase: x / RBC: x / WBC x   Sq Epi: x / Non Sq Epi: x / Bacteria: x

## 2025-04-17 NOTE — PROGRESS NOTE ADULT - SUBJECTIVE AND OBJECTIVE BOX
Infectious Diseases Follow Up:    Patient is a 39y old  Male who presents with a chief complaint of sob (02 Mar 2025 06:19)      Interval History/ROS:  No acute events,                            Allergies  No Known Allergies        ANTIMICROBIALS:  piperacillin/tazobactam IVPB.. 3.375 every 8 hours      Current Abx:     Previous Abx     OTHER MEDS:  MEDICATIONS  (STANDING):  acetaminophen     Tablet .. 650 every 6 hours PRN  albuterol/ipratropium for Nebulization 3 every 6 hours PRN  amLODIPine   Tablet 10 daily  cyclobenzaprine 5 three times a day PRN  dextrose 50% Injectable 25 once  dextrose 50% Injectable 12.5 once  dextrose 50% Injectable 25 once  dextrose Oral Gel 15 once PRN  DULoxetine 60 daily  gabapentin 900 every 8 hours  glucagon  Injectable 1 once  heparin   Injectable 10819 every 6 hours PRN  heparin   Injectable 5000 every 6 hours PRN  heparin  Infusion. 3500 <Continuous>  HYDROmorphone  Injectable 0.5 every 12 hours PRN  insulin lispro (ADMELOG) corrective regimen sliding scale  three times a day before meals  insulin lispro (ADMELOG) corrective regimen sliding scale  at bedtime  melatonin 3 at bedtime  oxyCODONE    IR 10 every 6 hours PRN  pantoprazole    Tablet 40 before breakfast  polyethylene glycol 3350 17 daily  QUEtiapine 25 at bedtime  senna 2 at bedtime      Vital Signs Last 24 Hrs  T(C): 36.3 (17 Apr 2025 08:00), Max: 36.5 (16 Apr 2025 14:19)  T(F): 97.4 (17 Apr 2025 08:00), Max: 97.7 (16 Apr 2025 14:19)  HR: 86 (17 Apr 2025 08:00) (86 - 109)  BP: 143/95 (17 Apr 2025 08:00) (136/94 - 160/95)  BP(mean): 108 (17 Apr 2025 05:03) (99 - 110)  RR: 17 (17 Apr 2025 08:00) (16 - 20)  SpO2: 99% (17 Apr 2025 08:00) (94% - 100%)    Parameters below as of 17 Apr 2025 08:00  Patient On (Oxygen Delivery Method): nasal cannula  O2 Flow (L/min): 2      PHYSICAL EXAM:  GENERAL: NAD, well-developed  HEAD:  Atraumatic, Normocephalic  EYES: EOMI, PERRLA, conjunctiva and sclera clear  NECK: Supple, No JVD  CHEST/LUNG: Clear to auscultation bilaterally; No wheeze  HEART: Regular rate and rhythm; No murmurs, rubs, or gallops  ABDOMEN: Soft, Nontender, Nondistended; Bowel sounds present  EXTREMITIES:  2+ Peripheral Pulses, No clubbing, cyanosis, or edema  PSYCH: AAOx3  NEUROLOGY: non-focal  SKIN: No rashes or lesions                          10.1   9.07  )-----------( 419      ( 17 Apr 2025 10:38 )             34.1       04-17    137  |  96[L]  |  14  ----------------------------<  89  4.7   |  25  |  1.34[H]    Ca    10.1      17 Apr 2025 06:30  Phos  5.8     04-17  Mg     1.80     04-17        Urinalysis Basic - ( 17 Apr 2025 06:30 )    Color: x / Appearance: x / SG: x / pH: x  Gluc: 89 mg/dL / Ketone: x  / Bili: x / Urobili: x   Blood: x / Protein: x / Nitrite: x   Leuk Esterase: x / RBC: x / WBC x   Sq Epi: x / Non Sq Epi: x / Bacteria: x        MICROBIOLOGY:  v  Tissue SACRAL WOUND AEBNDEMENT  04-15-25   Few Providencia stuartii  Rare Bacteroides vulgatus group "Susceptibilities not performed"  --  Providencia stuartii      Blood Blood-Venous  04-07-25   No growth at 5 days  --  --      Blood Blood-Peripheral  04-07-25   No growth at 5 days  --  --      Clean Catch Clean Catch (Midstream)  03-31-25   <10,000 CFU/mL Normal Urogenital Kyle  --  --      Blood Blood-Peripheral  03-30-25   No growth at 5 days  --  --      Trach Asp Tracheal Aspirate  03-21-25   Commensal kyle consistent with body site  --    Few polymorphonuclear leukocytes per low power field  No Squamous epithelial cells per low power field  No organisms seen per oil power field      Blood Blood-Peripheral  03-20-25   No growth at 5 days  --  --      Blood Blood-Venous  03-20-25   No growth at 5 days  --  --      Blood Blood-Peripheral  03-18-25   No growth at 5 days  --  Blood Culture PCR  Candida albicans                RADIOLOGY:       Infectious Diseases Follow Up:    Patient is a 39y old  Male who presents with a chief complaint of sob (02 Mar 2025 06:19)      Interval History/ROS:  No acute events                            Allergies  No Known Allergies        ANTIMICROBIALS:  piperacillin/tazobactam IVPB.. 3.375 every 8 hours      Current Abx:     Previous Abx     OTHER MEDS:  MEDICATIONS  (STANDING):  acetaminophen     Tablet .. 650 every 6 hours PRN  albuterol/ipratropium for Nebulization 3 every 6 hours PRN  amLODIPine   Tablet 10 daily  cyclobenzaprine 5 three times a day PRN  dextrose 50% Injectable 25 once  dextrose 50% Injectable 12.5 once  dextrose 50% Injectable 25 once  dextrose Oral Gel 15 once PRN  DULoxetine 60 daily  gabapentin 900 every 8 hours  glucagon  Injectable 1 once  heparin   Injectable 28407 every 6 hours PRN  heparin   Injectable 5000 every 6 hours PRN  heparin  Infusion. 3500 <Continuous>  HYDROmorphone  Injectable 0.5 every 12 hours PRN  insulin lispro (ADMELOG) corrective regimen sliding scale  three times a day before meals  insulin lispro (ADMELOG) corrective regimen sliding scale  at bedtime  melatonin 3 at bedtime  oxyCODONE    IR 10 every 6 hours PRN  pantoprazole    Tablet 40 before breakfast  polyethylene glycol 3350 17 daily  QUEtiapine 25 at bedtime  senna 2 at bedtime      Vital Signs Last 24 Hrs  T(C): 36.3 (17 Apr 2025 08:00), Max: 36.5 (16 Apr 2025 14:19)  T(F): 97.4 (17 Apr 2025 08:00), Max: 97.7 (16 Apr 2025 14:19)  HR: 86 (17 Apr 2025 08:00) (86 - 109)  BP: 143/95 (17 Apr 2025 08:00) (136/94 - 160/95)  BP(mean): 108 (17 Apr 2025 05:03) (99 - 110)  RR: 17 (17 Apr 2025 08:00) (16 - 20)  SpO2: 99% (17 Apr 2025 08:00) (94% - 100%)    Parameters below as of 17 Apr 2025 08:00  Patient On (Oxygen Delivery Method): nasal cannula  O2 Flow (L/min): 2      PHYSICAL EXAM:  GENERAL: NAD  HEAD:  Atraumatic, Normocephalic  EYES: EOMI, conjunctiva and sclera clear  CHEST/LUNG: On NC, CTAB  HEART: RRR  ABDOMEN: Soft, Nontender, Nondistended  Extremities: B/l feet wrapped   PSYCH: AAOx3                          10.1   9.07  )-----------( 419      ( 17 Apr 2025 10:38 )             34.1       04-17    137  |  96[L]  |  14  ----------------------------<  89  4.7   |  25  |  1.34[H]    Ca    10.1      17 Apr 2025 06:30  Phos  5.8     04-17  Mg     1.80     04-17        Urinalysis Basic - ( 17 Apr 2025 06:30 )    Color: x / Appearance: x / SG: x / pH: x  Gluc: 89 mg/dL / Ketone: x  / Bili: x / Urobili: x   Blood: x / Protein: x / Nitrite: x   Leuk Esterase: x / RBC: x / WBC x   Sq Epi: x / Non Sq Epi: x / Bacteria: x        MICROBIOLOGY:  v  Tissue SACRAL WOUND AEBNDEMENT  04-15-25   Few Providencia stuartii  Rare Bacteroides vulgatus group "Susceptibilities not performed"  --  Providencia stuartii      Blood Blood-Venous  04-07-25   No growth at 5 days  --  --      Blood Blood-Peripheral  04-07-25   No growth at 5 days  --  --      Clean Catch Clean Catch (Midstream)  03-31-25   <10,000 CFU/mL Normal Urogenital Kyle  --  --      Blood Blood-Peripheral  03-30-25   No growth at 5 days  --  --      Trach Asp Tracheal Aspirate  03-21-25   Commensal kyle consistent with body site  --    Few polymorphonuclear leukocytes per low power field  No Squamous epithelial cells per low power field  No organisms seen per oil power field      Blood Blood-Peripheral  03-20-25   No growth at 5 days  --  --      Blood Blood-Venous  03-20-25   No growth at 5 days  --  --      Blood Blood-Peripheral  03-18-25   No growth at 5 days  --  Blood Culture PCR  Candida albicans                RADIOLOGY:

## 2025-04-17 NOTE — CHART NOTE - NSCHARTNOTESELECT_GEN_ALL_CORE
CRITICAL CARE ULTRASOUND
Decannulation/Event Note
Event Note
Follow Up/Nutrition Services
Follow Up/Nutrition Services
MICU Ultrasound
Nutrition Services
POC
Palliative care
ROBERT
Thoracic ECMO Note/Event Note
Thoracic Post Op
Thoracic Surgery Post Op Note/Event Note
Transfer Note
follow up/Nutrition Services
Anesthesia/Event Note
Arterial Line Removal/Event Note
CRITICAL CARE ULTRASOUND
Event Note
Event Note
Follow Up/Nutrition Services
MRI expedite/Event Note
Nutrition Services
Nutrition Services
POCUS Attending/Event Note
Palliative Care Social Work
Palliative Care Social Work
TRANSESOPHAGEAL ECHOCARDIOGRAM
Thoracic Surgery
Thoracic Surgery/Event Note
Thoracic Surgery/Off Service Note
Thoracic surgery
Thoracic surgery
Transesophageal Echocardiogram
Ultrasound
follow-up/Nutrition Services
wound care surgery/Off Service Note

## 2025-04-17 NOTE — PROGRESS NOTE ADULT - NS ATTEND AMEND GEN_ALL_CORE FT
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     #Acute hypoxemic and hypercapnic respiratory failure s/p tracheostomy placement   - Decannulated 3/28, continues to use Bilevel NIV qhs    #RV failure 2/2 pulmHTN   - Maintaining euvolemia     #AFib - rate controlled     #DVT right IJ and bilateral peroneal veins - AC for DVT and AFib, held for surgery, - no significant hemorrhage, transition to Eliquis    #Transaminitis improving  -Hepatomegaly will need to follow as outpatient    #Fungemia  #Sacral ulceration with induration  - NM WBC scan performed  concerning for active infection with possible osteomyelitis of right pelvis/iliac bone. MRI unrevealing but given high clinical suspicion treating as OM. Pt will need long-term Abx, on Zosyn per ID until 5/11. Pt on caspofungin 4/15. Surgery consulted  - patient sp OR wound debridement on 4/15    #Pain  - Neuropathic pain  Evaluated by chronic pain service 4/8 as patient continues to have uncontrolled pain in the LE - Gabapentin now 900 q8h, Flexeril and increased to Cymbalta 60 mg po daily.  oxycodone 10 q6h prn, breakthrough Dilaudid 0.5 mg IV q12h for dressing changes. Will attempt to transition to po regimen   - also sp debridement of sacral wound   - pain service evaluated.

## 2025-04-17 NOTE — PROCEDURE NOTE - NSPROCNAME_GEN_A_CORE
Bronchoscopy
Bronchoscopy
General
Arterial Puncture/Cannulation
Bronchoscopy
Gastric Intubation/Gastric Lavage
General

## 2025-04-17 NOTE — PROCEDURE NOTE - NSINFORMCONSENT_GEN_A_CORE
Benefits, risks, and possible complications of procedure explained to patient/caregiver who verbalized understanding and gave written consent.
This was an emergent procedure.
Benefits, risks, and possible complications of procedure explained to patient/caregiver who verbalized understanding and gave verbal consent.
Benefits, risks, and possible complications of procedure explained to patient/caregiver who verbalized understanding and gave verbal consent.
warm

## 2025-04-17 NOTE — PROCEDURE NOTE - ADDITIONAL PROCEDURE DETAILS
Health Care Provider Name: Dr. Jacobsen  Reason for the FEEST: Evaluation of swallowing     Planned Location for the Procedure:  [ ] Med/Surg Floors    [ ] Intensive Care Unit     [ x] Respiratory Care Unit    [ ] Other: __________              FEEST Procedure Note:  Indication: Evalaution for swallowing in a patient with a tracheostomy     Operators: Dr. Jacobsen     Pre-op Dx: Hypercapnic respiratory failure     History: HTN, Obesity    Preop medications: Viscous lidocaine applied to the nares     Procedure: Laryngoscope inserted through right nare and successfully passed through nasal passage until situated just above epiglottis, providing direct visualization of epliglottis, true vocal cords and false vocal cords. Swallowing evaluation performed with speech pathologist at bedside with direct visualization. Various consistencies of solids and liquids were trialed. Patient tolerated examination well. Laryngoscope removed from nare. Recommendations for appropriate diet given by speech pathologist.
R wrist cleansed with alcohol pad thoroughly. Under US guidance the right radial artery was identified and punctured with a 23G butterfly needle to obtain blood gas sampling. 1-2 cc of blood drawn into syringe. Needle then withdrawn and gauze applied. Manual pressure maintained over puncture site until hemostasis achieved. Patient tolerated procedure well with no immediate complications.
ECMO DECANNULATION NOTE    Patient no longer requiring support from VV ECMO circuit.  After discussion with MICU/ECMO & Perfusion teams, decision was made to decannulate the patient.   Argatroban drip was held for hours prior to procedure.   Patient was prepped and draped in usual sterile fashion.   All sutures anchoring cannulas were cut and removed from the skin.   #1 Prolene pursestring sutures were used in each cannula site (Femoral & IJ).  Right Femoral pull cannula was removed first and suture tied down tight, no bleeding after suture tied.  Remaining blood in the circuit was returned to the patient.   The return cannula (Right IJ) was then removed, and suture tied down tight, no bleeding after suture was tied.   Pressure was held on both removal sites for 10 minutes.   Removal sites were cleaned and dressed with a sterile dressing.   Patient was left resting comfortably, not in any distress.  Patient tolerated the procedure very well.    Instructions given to RN to check removal sites every 15 minutes for the first hour.  Can then check every 30 minutes for the next hour then q 1 hour thereafter x 24 hours.     Team was instructed to call the CT surgery service with any issues or concerns.    Zack Valentin PA-C  Cardiothoracic Surgery  Spectra [phone] or [phone]    03-07-25 @ 17:17

## 2025-04-17 NOTE — PROCEDURE NOTE - PROCEDURE DATE TIME, MLM
17-Apr-2025 12:30
20-Mar-2025 12:20
26-Feb-2025 02:26
27-Feb-2025 09:30
03-Mar-2025 14:35
05-Mar-2025 11:05
07-Mar-2025 16:45
01-Mar-2025 12:30
03-Mar-2025

## 2025-04-17 NOTE — PROGRESS NOTE ADULT - ASSESSMENT
38 YO M with PMHx of morbid obesity, BEA on CPAP, pAFIB (not on AC), HTN, and BL papilledema attributed to pseudotumor cerebri who presented initially to [facility] on 2/24 with SOB and BL LE edema. Found with URI outpatient with progressive SOB, and concern for noted for MFPNA on imaging. Course progressed with AHRF with worsening O2 demand, respiratory distress, secretions, and somnolence requiring intubation (difficult with success after 6 attempts) in ED and admission to [facility] MICU. While in MICU, patient noted with increasing O2 demand with no improvement despite optimal vent management. Concern noted for progressive ARDS, unable to prone given morbid obesity, and ultimately cannulated for vvECMO on 2/25 and transferred to [facility] for further management on 2/26. While at [facility], tx with diuresis and ABX, and ultimately decannulated and s/p 60XLTCP trach and PEG on 3/7. Patient ultimately transferred to [facility] on 3/11 and course complicated by recurrent high grade fevers and found with fungemia.    NEUROLOGY  # Hx of Pseudotumor Cerebri   - s/p LP in 2024 with high opening pressure  - s/p MRI 2024 with possible pituitary mass but unclear because of pressure effect from pseudotumor cerebri causing partially empty sella.  - Prolactin elevated   - ACTH low but received steroids during admission   - FT4 low normal and TSH normal, but given FT4 low normal TSH should be higher   - Discuss endocrine consult for inhouse work up vs outpatient.     # Sedation   - Weaned off sedation in ICU   - Nimbex turned off 2/27   - Prop turned off 3/9   - Precedex turned off and catapres started 3/11  - catapres 0.2 TID (decreased 3/25) - tapered down 3/31 to 0.1 tid - discontinued 4/3     # Insomnia   - Patient worked night shift x 15 years   - Trouble sleeping at night leading to day time sleepiness and poor participation with PT  - Continue on Seroquel 25 QHS (increased 3/18)   - Continue on melatonin   - Continue on Neurontin as below    # BL LE neuropathy   - CIPN concern   - Neurontin increased to 300mg Q8H with relief   - added Percocet Q4H for moderate pain (3/25) and continue on Dilaudid Q6H for severe pain   - Pain mgt called to consult - recs for tylenol ATC, NEurontin increased to 400mg q6, Oxycodone 5 q4h prn , Dilaudid for BREAKTHROUGH pain only, Lidocaine patches on each leg, ice prn to feet  - Some improvement in pain - PT consult for TENS,   - oxycodone inc to 10mg as pt with no relief /buttocks area indurated /tender CT ordered -no abscess  - C/w Gabapentin : 600/600/800 and up titrate as required  - Switched to PO Dilaudid 2mg Q6H PRN moderate-severe and IV Dilaudid PRN severe breakthrough and with dressing changes   - Still with severe pain 10/10 despite change to Dilaudid will increase to 4mg q4h prn with iv Dilaudid breakthrough  - Neurontin increased to 800mg q8h Monitor neuro status/ lethargy   - I have reviewed with pt to not wait to long for dose of pain medication to  prevent pain from escalating   - Meds reviewed despite being in constant pain pt not taking Dilaudid as often as he can often with 10/10 pain Dilaudid will be every 4 hours with parameters and pt can refuse Will reconsult pain mgt again for any recommendations on Monday   - Pain management reconsulted , recs to stop Dilaudid PO ATC , continue Dilaudid for breakthrough pain with dressing changes, increase gabapentin to 900 mg Q8, add Cymbalta 30 mg QD and Flexeril 5mg Q8  - Neurontin inc to 900 with some decrease in pain but still severe   - Will do trial of capsaicin bid with strict instructions to avoid open /broken irritated skin/ and bandaged areas     CARDIOVASCULAR  # HTN   - HTN noted in ICU requiring cardene and esmolol GTTs   - Lisinopril dc'ed to increase catapres   - Continue on norvasc 10, catapres weaned off 4/3  - Monitor BP     # AFIB   - Hx of pAFIB   - No RVR episodes or pAFIB episodes in ICU   - No rate control medications needed  - Monitor on telemetry     # RV dilation second to PHTN   - POCUS on arrival to [facility] with RVE concern   - TTE 10/2024 with EF 55-60 with normal LVSF, moderate LVH and normal RVSF and size with no concern for pHTN   - TTE on 2/25 at  with EF 61 and normal LVSF, but enlarged RV size with reduced RVSF, mild TR, mild PHTN with PASP 49, and dilated IVC to 3.4cn, but normal TAPSE 2.1.  - Continue on aggressive diuresis in ICU    - TTE 3/11 with RVSF reduced with TAPSE 1.6. IVC improved to 2.12cm.   - Lasix 40 PO QD discontinued 4/7 secondary to ELLA  - Monitor IO/ BMP    RESPIRATORY  # ARDS second to MFPNA   - Hx of BEA on CPAP presented to  post URI with SOB and found with AHRF with progression to ARDS second to post viral MFPNA   - Intubated 2/25   - Cannulated for vvECMO 2/26   - s/p 60XLTCP tracheostomy 3/7   - Decannulated 3/7   - CTSx removed tracheostomy and ECMO sutures on 3/17   - Continue on TC QD with PMV as able with strong phonation  - Continue on nebs and HTS   -  trials continue and now trialing overnight with BIPAP for OHS (10/8/12)  - Continue on PS 18/10/40 QHS given OHS   - Decannulated 3/28   - c/w nasal cannula 2L     GI  # Dysphagia   - s/p PEG 3/7   - Attempted green dye on 3/13 and failed  - Continue on TF  - Continue on miralax and senna  - RPT green dye passed 3/19  - 3/20 Passed FEEST - on soft bite sized with thin liquids Added consistent carb 2/2 weight/Elevated A1c   - plan for OR 4/15 for PEG removal with cardiothoracic surgery   - PEG removed in OR DSD over site 4/15     # Mild transaminitis   - LFTs elevated in ICU with TBILI elevated likely from ECMO hemolysis   - US ABD with hepatic steatosis   - Trend LFTs      RENAL  # ELLA   - ELLA likely from cardiorenal with RVE   - Diuresed in ICU and improved   - Monitor renal function and UOP   - Recurrent ELLA noted suspected in s/o vancomycin toxicity (4/7)   - UA negative and Urine Studies FeNa 0.9 % suggest pre-renal etiology  - Lasix discontinued for now  - s/p IV fluid trial on 4/8 with minimal improvement  - Trialing additional fluids on 4/10   - Trend BMP and I/Os    # Hypernatremia (resolved)  - Hypernatremia with fever spikes   - Fever curve improved and attempting to wean FWF   - now off FWF    INFECTIOUS DISEASE  # Recurrent fevers with fungemia from unknown source   - Recurrent fevers post ECMO decannulation thought initially to be cytokine surge   - BCx, SCx, UCx, RVP and MRSA RPT on 3/12 negative   - Completed zosyn (3/12-3/18) empirically with improved WBC , however recurrent fevers noted.   - RPT SCx, UA, CXR and RVP negative  - TTE 3/17 with no IE  - PanCT 3/17 with PNA and persistent panniculitis  - BCx 3/15 and 3/16 with candida albicans, rpt BCx 3/18 and 3/20 negative   - Concern for possible source from panniculitis   - Continue on Caspofungin 70 mg daily 3/17 - 4/15, ID consult appreciated   - 3/30 spiked temp cx's sent + ua and c/s pending Bcx neg to date - started on vanco /zosyn ID following  - Vanco stopped (4/7) due to ELLA concern for Vanco toxicty and continued on empiric Zosyn/ Caspofungin   - 4/3 - persistent fevers, leukocytosis. Will repeat CT C/A/P to evaluate for source - no source found - Bilat lung opacities mildly decreased     - 4/8 bone scan: : "1. Large area of soft tissue infection in the R side of the pelvis as detailed above. Slightly more intense activity in the R iliac bone compared to the L raises possibility of OM. 2. Intense activity in the lateral aspect of the R forefoot compatible with soft tissue infection; complicating osteomyelitis is not excluded. Milder activity in the medial aspect of the L forefoot may reflect soft tissue infection."  - ID consult appreciated. Continue on Zosyn 3.375 g 8, plan on 6 week course for possible OM until 5/11 and Caspofungin 70 mg daily, will plan on 4 week course ending 4/15  - MR Pelvis Bony (4/11): No osteomyelitis. Sacral decubitus wound is again seen extending superiorly along the superficial margin of the right gluteus zurdo to the level of L5 with small amount of fluid and gas within the tract. The underlying gluteus zurdo demonstrates a region of decreased enhancement consistent with ischemia or necrosis measuring up to 11.9 cm transversely. Muscle edema suggests a nonspecific myositis. Small bilateral hip joint effusions with mild synovitis is nonspecific.  - Surgery c/s (4/14) - plan for operative sacral wound debridement and I&D done 4/15 s  - Caspofungin finished 4/15    # MFPNA and abdominal pannus cellulitis   - Presented to  post URI with SOB and found with AHRF with progression to ARDS second to post viral MFPNA   - RVP, legionella, strept, BAL, BCx, UCx, HIV, PCP, and adenovirus PCR negative   - Initially started on multiple agents in ICU and ultimately completed zosyn (2/26-3/9) ZMAX (2/25-2/26) and vanco (2/26-3/1)     # R/o Sacral OM  - Noted with hard indurated sacral wound by Wound Care Team  - CT Pelvis (3/29) revealing superficial skin thickening overlying the sacrum with underlying edema of the subcutaneous fat, in keeping with the clinical history of sacral wound. No evidence of underlying tract or drainable fluid collection.  - CT 4/3 - no fluid collection in sacral tissue   - Given ongoing fever with no clear source with obtain MRI Pelvis per ID recs to r/o sacral OM once renal fx improves    # Penile discharge   - GC/ chlamydia negative   - HIV negative   - Monitor for now    # PPX   - MRSA PCR positive and completed bactroban (2/26-3/3)     HEME   # Anemia likely second to ECMO circuit vs AOCD   - HH low in ICU second to ECMO circuit   - HH dropping again today however no bleeding noted   - Microcytic and hemochromic, sent anemia panel 3/19  - Trend HH     VASCULAR   # R IJ and BL LE DVTs   - Post ECMO dopplers on 3/9 negative for BL UE/ LE DVTs.   - Subsequent post ECMO dopplers performed on 3/14 and noted with acute, non-occlusive deep vein thrombosis noted within the right internal jugular vein. Acute, occlusive deep vein thromboses noted within the right and left peroneal veins at both mid calves, and age-indeterminate, occlusive deep vein thrombosis visualized within the left gastrocnemius vein at the proximal calf.     - Continue on argatroban GTT and changed to Eliquis   - on heparin gtt in preparation for PEG removal (4/15) -  hold hep gtt at 12am tonight  - HOld Heparin gtt until am eval to restart AC in am 4/16    PODIATRY   # BL plantar feet blisters likely pressors induced  - Seen by podiatry and blisters lanced on 3/4 and again on 3/18  - Continue on local wound care per podiatry   - Podiatry following  - OK for WBAT will fu with PT for offloading shoe if appropriate  - Pain mgt consult   - Podiatry FU 3/28 / 4/3 done /following     ENDOCRINE  # Pre-DM2   - A1C 6.7  - Continue on ISS   - Monitor FS   - IF no demand then stop ISS     LINES/ TUBES  - R IJ and R FEM ECMO (2/26-3/7)     SKIN  # Pannus canndial intertrigo   # Sacrum/ buttock MAD  - Continue on clotrimazole cream   - WOC appreciated   -Seen by WC pt noted to have oozing from Buttock wound surgicel placed by wound care On follow up no further bleeding  - Wound care placed orders   - No bleeding from wound     ETHICS/ GOC    - FULL CODE     DISPO - PT following  Seen by PMR for acute rehab per recs   40 YO M with PMHx of morbid obesity, BEA on CPAP, pAFIB (not on AC), HTN, and BL papilledema attributed to pseudotumor cerebri who presented initially to [facility] on 2/24 with SOB and BL LE edema. Found with URI outpatient with progressive SOB, and concern for noted for MFPNA on imaging. Course progressed with AHRF with worsening O2 demand, respiratory distress, secretions, and somnolence requiring intubation (difficult with success after 6 attempts) in ED and admission to [facility] MICU. While in MICU, patient noted with increasing O2 demand with no improvement despite optimal vent management. Concern noted for progressive ARDS, unable to prone given morbid obesity, and ultimately cannulated for vvECMO on 2/25 and transferred to [facility] for further management on 2/26. While at [facility], tx with diuresis and ABX, and ultimately decannulated and s/p 60XLTCP trach and PEG on 3/7. Patient ultimately transferred to [facility] on 3/11 and course complicated by recurrent high grade fevers and found with fungemia.    NEUROLOGY  # Hx of Pseudotumor Cerebri   - s/p LP in 2024 with high opening pressure  - s/p MRI 2024 with possible pituitary mass but unclear because of pressure effect from pseudotumor cerebri causing partially empty sella.  - Prolactin elevated   - ACTH low but received steroids during admission   - FT4 low normal and TSH normal, but given FT4 low normal TSH should be higher   - Discuss endocrine consult for inhouse work up vs outpatient.     # Sedation   - Weaned off sedation in ICU   - Nimbex turned off 2/27   - Prop turned off 3/9   - Precedex turned off and catapres started 3/11  - catapres 0.2 TID (decreased 3/25) - tapered down 3/31 to 0.1 tid - discontinued 4/3     # Insomnia   - Patient worked night shift x 15 years   - Trouble sleeping at night leading to day time sleepiness and poor participation with PT  - Continue on Seroquel 25 QHS (increased 3/18)   - Continue on melatonin   - Continue on Neurontin as below    # BL LE neuropathy   - CIPN concern   - Neurontin increased to 300mg Q8H with relief   - Pain mgt called to consult - recs for tylenol ATC, NEurontin increased to 400mg q6, Oxycodone 5 q4h prn , Dilaudid for BREAKTHROUGH pain only, Lidocaine patches on each leg, ice prn to feet  - Some improvement in pain - PT consult for TENS,   - oxycodone inc to 10mg as pt with no relief /buttocks area indurated /tender CT ordered -no abscess  - C/w Gabapentin : 600/600/800 and up titrate as required  - Switched to PO Dilaudid 2mg Q6H PRN moderate-severe and IV Dilaudid PRN severe breakthrough and with dressing changes   - Still with severe pain 10/10 despite change to Dilaudid will increase to 4mg q4h prn with iv Dilaudid breakthrough  - Neurontin increased to 800mg q8h Monitor neuro status/ lethargy   - I have reviewed with pt to not wait to long for dose of pain medication to  prevent pain from escalating   - Meds reviewed despite being in constant pain pt not taking Dilaudid as often as he can often with 10/10 pain Dilaudid will be every 4 hours with parameters and pt can refuse Will reconsult pain mgt again for any recommendations on Monday   - Pain management reconsulted , recs to stop Dilaudid PO ATC , continue Dilaudid for breakthrough pain with dressing changes, increase gabapentin to 900 mg Q8, add Cymbalta 30 mg QD and Flexeril 5mg Q8  - Neurontin inc to 900 with some decrease in pain but still severe   - Will do trial of capsaicin bid with strict instructions to avoid open /broken irritated skin/ and bandaged areas     CARDIOVASCULAR  # HTN   - HTN noted in ICU requiring cardene and esmolol GTTs   - Lisinopril dc'ed to increase catapres   - Continue on norvasc 10, catapres weaned off 4/3  - Monitor BP     # AFIB   - Hx of pAFIB   - No RVR episodes or pAFIB episodes in ICU   - No rate control medications needed  - Monitor on telemetry     # RV dilation second to PHTN   - POCUS on arrival to [facility] with RVE concern   - TTE 10/2024 with EF 55-60 with normal LVSF, moderate LVH and normal RVSF and size with no concern for pHTN   - TTE on 2/25 at  with EF 61 and normal LVSF, but enlarged RV size with reduced RVSF, mild TR, mild PHTN with PASP 49, and dilated IVC to 3.4cn, but normal TAPSE 2.1.  - Continue on aggressive diuresis in ICU    - TTE 3/11 with RVSF reduced with TAPSE 1.6. IVC improved to 2.12cm.   - Lasix 40 PO QD discontinued 4/7 secondary to ELLA  - Monitor IO/ BMP    RESPIRATORY  # ARDS second to MFPNA   - Hx of BEA on CPAP presented to  post URI with SOB and found with AHRF with progression to ARDS second to post viral MFPNA   - Intubated 2/25   - Cannulated for vvECMO 2/26   - s/p 60XLTCP tracheostomy 3/7   - Decannulated 3/7   - CTSx removed tracheostomy and ECMO sutures on 3/17   - Continue on TC QD with PMV as able with strong phonation  - Continue on nebs and HTS   -  trials continue and now trialing overnight with BIPAP for OHS (10/8/12)  - Continue on PS 18/10/40 QHS given OHS   - Decannulated 3/28   - c/w nasal cannula 2L     GI  # Dysphagia   - s/p PEG 3/7   - Attempted green dye on 3/13 and failed  - Continue on TF  - Continue on miralax and senna  - RPT green dye passed 3/19  - 3/20 Passed FEEST - on soft bite sized with thin liquids Added consistent carb 2/2 weight/Elevated A1c   - plan for OR 4/15 for PEG removal with cardiothoracic surgery   - PEG removed in OR DSD over site 4/15     # Mild transaminitis   - LFTs elevated in ICU with TBILI elevated likely from ECMO hemolysis   - US ABD with hepatic steatosis   - Trend LFTs      RENAL  # ELLA   - ELLA likely from cardiorenal with RVE   - Diuresed in ICU and improved   - Monitor renal function and UOP   - Recurrent ELLA noted suspected in s/o vancomycin toxicity (4/7)   - UA negative and Urine Studies FeNa 0.9 % suggest pre-renal etiology  - Lasix discontinued for now  - s/p IV fluid trial on 4/8 with minimal improvement  - Trialing additional fluids on 4/10   - Trend BMP and I/Os    # Hypernatremia (resolved)  - Hypernatremia with fever spikes   - Fever curve improved and attempting to wean FWF   - now off FWF    INFECTIOUS DISEASE  # Recurrent fevers with fungemia from unknown source   - Recurrent fevers post ECMO decannulation thought initially to be cytokine surge   - BCx, SCx, UCx, RVP and MRSA RPT on 3/12 negative   - Completed zosyn (3/12-3/18) empirically with improved WBC , however recurrent fevers noted.   - RPT SCx, UA, CXR and RVP negative  - TTE 3/17 with no IE  - PanCT 3/17 with PNA and persistent panniculitis  - BCx 3/15 and 3/16 with candida albicans, rpt BCx 3/18 and 3/20 negative   - Concern for possible source from panniculitis   - Continue on Caspofungin 70 mg daily 3/17 - 4/15, ID consult appreciated   - 3/30 spiked temp cx's sent + ua and c/s pending Bcx neg to date - started on vanco /zosyn ID following  - Vanco stopped (4/7) due to ELLA concern for Vanco toxicty and continued on empiric Zosyn/ Caspofungin   - 4/3 - persistent fevers, leukocytosis. Will repeat CT C/A/P to evaluate for source - no source found - Bilat lung opacities mildly decreased     - 4/8 bone scan: : "1. Large area of soft tissue infection in the R side of the pelvis as detailed above. Slightly more intense activity in the R iliac bone compared to the L raises possibility of OM. 2. Intense activity in the lateral aspect of the R forefoot compatible with soft tissue infection; complicating osteomyelitis is not excluded. Milder activity in the medial aspect of the L forefoot may reflect soft tissue infection."  - ID consult appreciated. Continue on Zosyn 3.375 g 8, plan on 6 week course for possible OM until 5/11 and Caspofungin 70 mg daily, will plan on 4 week course ending 4/15  - MR Pelvis Bony (4/11): No osteomyelitis. Sacral decubitus wound is again seen extending superiorly along the superficial margin of the right gluteus zurdo to the level of L5 with small amount of fluid and gas within the tract. The underlying gluteus zurdo demonstrates a region of decreased enhancement consistent with ischemia or necrosis measuring up to 11.9 cm transversely. Muscle edema suggests a nonspecific myositis. Small bilateral hip joint effusions with mild synovitis is nonspecific.  - Surgery c/s (4/14) - plan for operative sacral wound debridement and I&D done 4/15 s  - Caspofungin finished 4/15    # MFPNA and abdominal pannus cellulitis   - Presented to  post URI with SOB and found with AHRF with progression to ARDS second to post viral MFPNA   - RVP, legionella, strept, BAL, BCx, UCx, HIV, PCP, and adenovirus PCR negative   - Initially started on multiple agents in ICU and ultimately completed zosyn (2/26-3/9) ZMAX (2/25-2/26) and vanco (2/26-3/1)     # R/o Sacral OM  - Noted with hard indurated sacral wound by Wound Care Team  - CT Pelvis (3/29) revealing superficial skin thickening overlying the sacrum with underlying edema of the subcutaneous fat, in keeping with the clinical history of sacral wound. No evidence of underlying tract or drainable fluid collection.  - CT 4/3 - no fluid collection in sacral tissue   - Given ongoing fever with no clear source with obtain MRI Pelvis per ID recs to r/o sacral OM once renal fx improves    # Penile discharge   - GC/ chlamydia negative   - HIV negative   - Monitor for now    # PPX   - MRSA PCR positive and completed bactroban (2/26-3/3)     HEME   # Anemia likely second to ECMO circuit vs AOCD   - HH low in ICU second to ECMO circuit   - HH dropping again today however no bleeding noted   - Microcytic and hemochromic, sent anemia panel 3/19  - Trend      VASCULAR   # R IJ and BL LE DVTs   - Post ECMO dopplers on 3/9 negative for BL UE/ LE DVTs.   - Subsequent post ECMO dopplers performed on 3/14 and noted with acute, non-occlusive deep vein thrombosis noted within the right internal jugular vein. Acute, occlusive deep vein thromboses noted within the right and left peroneal veins at both mid calves, and age-indeterminate, occlusive deep vein thrombosis visualized within the left gastrocnemius vein at the proximal calf.     - Continue on argatroban GTT and changed to Eliquis   - on heparin gtt in preparation for PEG removal (4/15) -  hold hep gtt at 12am tonight  - HOld Heparin gtt until am eval to restart AC in am 4/16    PODIATRY   # BL plantar feet blisters likely pressors induced  - Seen by podiatry and blisters lanced on 3/4 and again on 3/18  - Continue on local wound care per podiatry   - Podiatry following  - OK for WBAT will fu with PT for offloading shoe if appropriate  - Pain mgt consult   - Podiatry FU 3/28 / 4/3 done /following     ENDOCRINE  # Pre-DM2   - A1C 6.7  - Continue on ISS   - Monitor FS   - IF no demand then stop ISS     LINES/ TUBES  - R IJ and R FEM ECMO (2/26-3/7)     SKIN  # Pannus canndial intertrigo   # Sacrum/ buttock MAD  - Continue on clotrimazole cream   - WOC appreciated   -Seen by WC pt noted to have oozing from Buttock wound surgicel placed by wound care On follow up no further bleeding  - Wound care placed orders   - No bleeding from wound     ETHICS/ GOC    - FULL CODE     DISPO - PT following  Seen by PMR for acute rehab per recs   40 YO M with PMHx of morbid obesity, BEA on CPAP, pAFIB (not on AC), HTN, and BL papilledema attributed to pseudotumor cerebri who presented initially to [facility] on 2/24 with SOB and BL LE edema. Found with URI outpatient with progressive SOB, and concern for noted for MFPNA on imaging. Course progressed with AHRF with worsening O2 demand, respiratory distress, secretions, and somnolence requiring intubation (difficult with success after 6 attempts) in ED and admission to [facility] MICU. While in MICU, patient noted with increasing O2 demand with no improvement despite optimal vent management. Concern noted for progressive ARDS, unable to prone given morbid obesity, and ultimately cannulated for vvECMO on 2/25 and transferred to [facility] for further management on 2/26. While at [facility], tx with diuresis and ABX, and ultimately decannulated and s/p 60XLTCP trach and PEG on 3/7. Patient ultimately transferred to [facility] on 3/11 and course complicated by recurrent high grade fevers and found with fungemia.    NEUROLOGY  # Hx of Pseudotumor Cerebri   - s/p LP in 2024 with high opening pressure  - s/p MRI 2024 with possible pituitary mass but unclear because of pressure effect from pseudotumor cerebri causing partially empty sella.  - Prolactin elevated   - ACTH low but received steroids during admission   - FT4 low normal and TSH normal, but given FT4 low normal TSH should be higher   - Discuss endocrine consult for inhouse work up vs outpatient.     # Sedation   - Weaned off sedation in ICU   - Nimbex turned off 2/27   - Prop turned off 3/9   - Precedex turned off and catapres started 3/11  - catapres 0.2 TID (decreased 3/25) - tapered down 3/31 to 0.1 tid - discontinued 4/3     # Insomnia   - Patient worked night shift x 15 years   - Trouble sleeping at night leading to day time sleepiness and poor participation with PT  - Continue on Seroquel 25 QHS (increased 3/18)   - Continue on melatonin   - Continue on Neurontin as below    # BL LE neuropathy   - CIPN concern   - Neurontin increased to 300mg Q8H with relief   - Pain mgt called to consult - recs for tylenol ATC, NEurontin increased to 400mg q6, Oxycodone 5 q4h prn , Dilaudid for BREAKTHROUGH pain only, Lidocaine patches on each leg, ice prn to feet  - Some improvement in pain - PT consult for TENS,   - oxycodone inc to 10mg as pt with no relief /buttocks area indurated /tender CT ordered -no abscess  - C/w Gabapentin : 600/600/800 and up titrate as required  - Switched to PO Dilaudid 2mg Q6H PRN moderate-severe and IV Dilaudid PRN severe breakthrough and with dressing changes   - Still with severe pain 10/10 despite change to Dilaudid will increase to 4mg q4h prn with iv Dilaudid breakthrough  - Neurontin increased to 800mg q8h Monitor neuro status/ lethargy   - I have reviewed with pt to not wait to long for dose of pain medication to  prevent pain from escalating   - Meds reviewed despite being in constant pain pt not taking Dilaudid as often as he can often with 10/10 pain Dilaudid will be every 4 hours with parameters and pt can refuse Will reconsult pain mgt again for any recommendations on Monday   - Pain management reconsulted , recs to stop Dilaudid PO ATC , continue Dilaudid for breakthrough pain with dressing changes, increase gabapentin to 900 mg Q8, increased Cymbalta to 60 mg QD and Flexeril 5mg Q8  - Neurontin inc to 900 with some decrease in pain but still severe   - Will do trial of capsaicin bid with strict instructions to avoid open /broken irritated skin/ and bandaged areas     CARDIOVASCULAR  # HTN   - HTN noted in ICU requiring cardene and esmolol GTTs   - Lisinopril dc'ed to increase catapres   - Continue on norvasc 10, catapres weaned off 4/3  - Monitor BP     # AFIB   - Hx of pAFIB   - No RVR episodes or pAFIB episodes in ICU   - No rate control medications needed  - Monitor on telemetry     # RV dilation second to PHTN   - POCUS on arrival to [facility] with RVE concern   - TTE 10/2024 with EF 55-60 with normal LVSF, moderate LVH and normal RVSF and size with no concern for pHTN   - TTE on 2/25 at  with EF 61 and normal LVSF, but enlarged RV size with reduced RVSF, mild TR, mild PHTN with PASP 49, and dilated IVC to 3.4cn, but normal TAPSE 2.1.  - Continue on aggressive diuresis in ICU    - TTE 3/11 with RVSF reduced with TAPSE 1.6. IVC improved to 2.12cm.   - Lasix 40 PO QD discontinued 4/7 secondary to ELLA  - Monitor IO/ BMP    RESPIRATORY  # ARDS second to MFPNA   - Hx of BEA on CPAP presented to  post URI with SOB and found with AHRF with progression to ARDS second to post viral MFPNA   - Intubated 2/25   - Cannulated for vvECMO 2/26   - s/p 60XLTCP tracheostomy 3/7   - Decannulated 3/7   - CTSx removed tracheostomy and ECMO sutures on 3/17   - Continue on TC QD with PMV as able with strong phonation  - Continue on nebs and HTS   -  trials continue and now trialing overnight with BIPAP for OHS (10/8/12)  - Continue on PS 18/10/40 QHS given OHS   - Decannulated 3/28   - c/w nasal cannula 2L     GI  # Dysphagia   - s/p PEG 3/7   - Attempted green dye on 3/13 and failed  - Continue on TF  - Continue on miralax and senna  - RPT green dye passed 3/19  - 3/20 Passed FEEST - on soft bite sized with thin liquids Added consistent carb 2/2 weight/Elevated A1c   - PEG removed in OR DSD over site 4/15     # Mild transaminitis   - LFTs elevated in ICU with TBILI elevated likely from ECMO hemolysis   - US ABD with hepatic steatosis   - Trend LFTs      RENAL  # ELLA   - ELLA likely from cardiorenal with RVE   - Diuresed in ICU and improved   - Monitor renal function and UOP   - Recurrent ELLA noted suspected in s/o vancomycin toxicity (4/7)   - UA negative and Urine Studies FeNa 0.9 % suggest pre-renal etiology  - Lasix discontinued for now  - s/p IV fluid trial on 4/8 with minimal improvement  - Trialing additional fluids on 4/10   - Trend BMP and I/Os    # Hypernatremia (resolved)  - Hypernatremia with fever spikes   - Fever curve improved and attempting to wean FWF   - now off FWF    INFECTIOUS DISEASE  # Recurrent fevers with fungemia from unknown source   - Recurrent fevers post ECMO decannulation thought initially to be cytokine surge   - BCx, SCx, UCx, RVP and MRSA RPT on 3/12 negative   - Completed zosyn (3/12-3/18) empirically with improved WBC , however recurrent fevers noted.   - RPT SCx, UA, CXR and RVP negative  - TTE 3/17 with no IE  - PanCT 3/17 with PNA and persistent panniculitis  - BCx 3/15 and 3/16 with candida albicans, rpt BCx 3/18 and 3/20 negative   - Concern for possible source from panniculitis   - Continue on Caspofungin 70 mg daily 3/17 - 4/15, ID consult appreciated   - 3/30 spiked temp cx's sent + ua and c/s pending Bcx neg to date - started on vanco /zosyn ID following  - Vanco stopped (4/7) due to ELLA concern for Vanco toxicty and continued on empiric Zosyn/ Caspofungin   - 4/3 - persistent fevers, leukocytosis. Will repeat CT C/A/P to evaluate for source - no source found - Bilat lung opacities mildly decreased     - 4/8 bone scan: : "1. Large area of soft tissue infection in the R side of the pelvis as detailed above. Slightly more intense activity in the R iliac bone compared to the L raises possibility of OM. 2. Intense activity in the lateral aspect of the R forefoot compatible with soft tissue infection; complicating osteomyelitis is not excluded. Milder activity in the medial aspect of the L forefoot may reflect soft tissue infection."  - ID consult appreciated. Continue on Zosyn 3.375 g 8, plan on 6 week course for possible OM until 5/11 and s/p Caspofungin 70 mg daily, which ended 4/15  - MR Pelvis Bony (4/11): No osteomyelitis. Sacral decubitus wound is again seen extending superiorly along the superficial margin of the right gluteus zurdo to the level of L5 with small amount of fluid and gas within the tract. The underlying gluteus zurdo demonstrates a region of decreased enhancement consistent with ischemia or necrosis measuring up to 11.9 cm transversely. Muscle edema suggests a nonspecific myositis. Small bilateral hip joint effusions with mild synovitis is nonspecific.  - S/P sacral wound debridement with cultures growing Providencia and Bacteroides (4/15)  - Caspofungin finished 4/15    # MFPNA and abdominal pannus cellulitis   - Presented to HH post URI with SOB and found with AHRF with progression to ARDS second to post viral MFPNA   - RVP, legionella, strept, BAL, BCx, UCx, HIV, PCP, and adenovirus PCR negative   - Initially started on multiple agents in ICU and ultimately completed zosyn (2/26-3/9) ZMAX (2/25-2/26) and vanco (2/26-3/1)     # R/o Sacral OM  - Noted with hard indurated sacral wound by Wound Care Team  - CT Pelvis (3/29) revealing superficial skin thickening overlying the sacrum with underlying edema of the subcutaneous fat, in keeping with the clinical history of sacral wound. No evidence of underlying tract or drainable fluid collection.  - CT 4/3 - no fluid collection in sacral tissue   - Given ongoing fever with no clear source with obtain MRI Pelvis per ID recs to r/o sacral OM once renal fx improves  - S/P sacral wound debridement with cultures growing Providencia and Bacteroides (4/15)    # Penile discharge   - GC/ chlamydia negative   - HIV negative   - Monitor for now    # PPX   - MRSA PCR positive and completed bactroban (2/26-3/3)     HEME   # Anemia likely second to ECMO circuit vs AOCD   - HH low in ICU second to ECMO circuit   - HH dropping again today however no bleeding noted   - Microcytic and hemochromic, sent anemia panel 3/19  - Trend HH     VASCULAR   # R IJ and BL LE DVTs   - Post ECMO dopplers on 3/9 negative for BL UE/ LE DVTs.   - Subsequent post ECMO dopplers performed on 3/14 and noted with acute, non-occlusive deep vein thrombosis noted within the right internal jugular vein. Acute, occlusive deep vein thromboses noted within the right and left peroneal veins at both mid calves, and age-indeterminate, occlusive deep vein thrombosis visualized within the left gastrocnemius vein at the proximal calf.     - Continue on argatroban GTT and changed to Eliquis   - S/P heparin gtt prior to PEG removal now transitioned back to Eliquis (4/17)    PODIATRY   # BL plantar feet blisters likely pressors induced  - Seen by podiatry and blisters lanced on 3/4 and again on 3/18  - Continue on local wound care per podiatry   - Podiatry following  - OK for WBAT will fu with PT for offloading shoe if appropriate  - Pain mgt consult   - Podiatry FU 3/28 / 4/3 done /following     ENDOCRINE  # Pre-DM2   - A1C 6.7  - Continue on ISS   - Monitor FS   - IF no demand then stop ISS     LINES/ TUBES  - R IJ and R FEM ECMO (2/26-3/7)     SKIN  # Pannus canndial intertrigo   # Sacrum/ buttock MAD  - Continue on clotrimazole cream   - WOC appreciated   -Seen by WC pt noted to have oozing from Buttock wound surgicel placed by wound care On follow up no further bleeding  - Wound care placed orders   - No bleeding from wound     ETHICS/ GOC    - FULL CODE     DISPO - PT following  Seen by PMR for acute rehab per recs

## 2025-04-17 NOTE — CHART NOTE - NSCHARTNOTEFT_GEN_A_CORE
NUTRITION FOLLOW UP NOTE     REASON FOR ASSESSMENT: NUTRITION FOLLOW UP     SOURCE:    [x] Medical record [x] RN/PCA  [x] Patient    MEDICAL COURSE:  Per chart, "38 y/o male with PMHx of morbid obesity, BEA on CPAP, pAFIB (not on AC), HTN, and BL papilledema attributed to pseudotumor cerebri who presented initially to [facility] on 2/24 with SOB and BL LE edema. Found with URI outpatient with progressive SOB, and concern for noted for MFPNA on imaging. Course progressed with AHRF with worsening O2 demand, respiratory distress, secretions, and somnolence requiring intubation (difficult with success after 6 attempts) in ED and admission to [facility] MICU."    DIET PRESCRIPTION:  Diet, Soft and Bite Sized:   Consistent Carbohydrate {Evening Snack} (CSTCHOSN)  Supplement Feeding Modality:  Oral  Ensure Max Cans or Servings Per Day:  1       Frequency:  Two Times a day (04-16-25 @ 09:47) [Active]        NUTRITION COURSE:  Nutrition interview: No recent episodes of nausea, vomiting, diarrhea, or constipation. Last BM noted 4/16 per RN flowsheets. Denies any chewing/swallowing difficulties. Last swallow evaluation completed 3/20; soft and bite sized, thin liquids. PA to put in for re-evaluation. Food preferences explored, no new preferences at present. Intake varies between fair-excellent (%) per RN flowsheets and per Pt. Feeding Skills: independent. Pt also receiving and consuming 100% of Ensure Max 2x daily (300 elsa and 60 gm protein).       PERTINENT MEDICATIONS:  MEDICATIONS  (STANDING):  amLODIPine   Tablet 10 milliGRAM(s) Oral daily  capsaicin HP 0.075% Cream 1 Application(s) Topical four times a day  chlorhexidine 2% Cloths 1 Application(s) Topical <User Schedule>  clotrimazole 1% Cream 1 Application(s) Topical two times a day  Dakins Solution - 1/4 Strength 1 Application(s) Topical two times a day  dextrose 50% Injectable 25 Gram(s) IV Push once  dextrose 50% Injectable 12.5 Gram(s) IV Push once  dextrose 50% Injectable 25 Gram(s) IV Push once  DULoxetine 60 milliGRAM(s) Oral daily  gabapentin 900 milliGRAM(s) Oral every 8 hours  glucagon  Injectable 1 milliGRAM(s) IntraMuscular once  heparin  Infusion. 3500 Unit(s)/Hr (35 mL/Hr) IV Continuous <Continuous>  insulin lispro (ADMELOG) corrective regimen sliding scale   SubCutaneous three times a day before meals  insulin lispro (ADMELOG) corrective regimen sliding scale   SubCutaneous at bedtime  lidocaine   4% Patch 1 Patch Transdermal daily  lidocaine   4% Patch 1 Patch Transdermal daily  melatonin 3 milliGRAM(s) Oral at bedtime  multivitamin 1 Tablet(s) Oral daily  mupirocin 2% Ointment 1 Application(s) Topical two times a day  pantoprazole    Tablet 40 milliGRAM(s) Oral before breakfast  piperacillin/tazobactam IVPB.. 3.375 Gram(s) IV Intermittent every 8 hours  polyethylene glycol 3350 17 Gram(s) Oral daily  QUEtiapine 25 milliGRAM(s) Oral at bedtime  senna 2 Tablet(s) Oral at bedtime  sevelamer carbonate 800 milliGRAM(s) Oral three times a day with meals    MEDICATIONS  (PRN):  acetaminophen     Tablet .. 650 milliGRAM(s) Oral every 6 hours PRN Temp greater or equal to 38C (100.4F), Mild Pain (1 - 3), Moderate Pain (4 - 6)  albuterol/ipratropium for Nebulization 3 milliLiter(s) Nebulizer every 6 hours PRN Shortness of Breath and/or Wheezing  cyclobenzaprine 5 milliGRAM(s) Oral three times a day PRN Muscle Spasm  dextrose Oral Gel 15 Gram(s) Oral once PRN Blood Glucose LESS THAN 70 milliGRAM(s)/deciliter  heparin   Injectable 90330 Unit(s) IV Push every 6 hours PRN For aPTT less than 40  heparin   Injectable 5000 Unit(s) IV Push every 6 hours PRN For aPTT between 40 - 57  HYDROmorphone  Injectable 0.5 milliGRAM(s) IV Push every 12 hours PRN dressing changes and severe breakthrough  oxyCODONE    IR 10 milliGRAM(s) Oral every 6 hours PRN Severe Pain (7 - 10)    [x] Relevant notes on medications: Continues on multivitamin once daily for micronutrient provisions     PERTINENT LABS:  04-17 Na137 mmol/L Glu 89 mg/dL K+ 4.7 mmol/L Cr  1.34 mg/dL[H] BUN 14 mg/dL 04-17 Phos 5.8 mg/dL[H]     A1C with Estimated Average Glucose Result: 6.7 % (02-25-25 @ 05:44)    POCT Blood Glucose.: 129 mg/dL (17 Apr 2025 13:16)  POCT Blood Glucose.: 107 mg/dL (17 Apr 2025 08:04)  POCT Blood Glucose.: 104 mg/dL (16 Apr 2025 20:53)  POCT Blood Glucose.: 110 mg/dL (16 Apr 2025 16:54)    [x] Relevant notes on labs:  Noted with elevated phosphorus levels, and on phos binding meds. Will recommend therapeutic diet change.     ANTHROPOMETRICS:  Height (cm): 177.8 (04-15 @ 06:56)  Weight (kg): 181.4 (04-15 @ 06:56), 197.2 (04-01)  BMI (kg/m2): 57.4 (04-15 @ 06:56)  Weight Assessment: Pt with significant wt loss x15d? (-8.0%), possibly bedscale error, Pt also noted with edema, could be fluid shifts.     PHYSICAL ASSESSMENT, per flowsheets:  Edema: generalized 1+   Pressure Injury: Sacrum/gluteal cleft and bilateral buttocks Stage 4 per wound care note 4/10      ESTIMATED NEEDS:  [x] continue the same from last assessment, based on IBW + 10% 162.8lbs/ 74kg    1850-2220kcal daily @ 25-30kcal/kg, 103.6-118.4 gm protein daily @ 1.4-1.6gm/kg     PREVIOUS NUTRITION DX: [ x] Unintended Weight Loss  Nutrition Diagnosis is [ ] ongoing  [x ] resolved [ ] not applicable   New Nutrition Diagnosis: [x ] Overweight and obesity     EDUCATION:  [x ] not warranted at present    RECOMMENDATIONS/INTERVENTIONS:  1) recommend change diet to Consistent Carbohydrate, No concentrated Phosphorus, Soft and Bite sized   2) Recommend swallow evaluation to assess appropriateness of current diet consistency   3) Continue Ensure Max 2x daily (300 elsa and 60 gm protein)   4) Continue to provide multivitamin once daily for micronutrient provisions   5) RD to f/u prn     MONITORING & EVALUATING:  PO intake, tolerance to diet/supplement, nutrition related lab values, weight trends, BMs/GI distress, hydration status, skin integrity.    Dahiana Fofana, MS, RDN | Available on MS TEAMS | Office #[phone]

## 2025-04-18 LAB
ANION GAP SERPL CALC-SCNC: 10 MMOL/L — SIGNIFICANT CHANGE UP (ref 7–14)
BUN SERPL-MCNC: 13 MG/DL — SIGNIFICANT CHANGE UP (ref 7–23)
CALCIUM SERPL-MCNC: 9.7 MG/DL — SIGNIFICANT CHANGE UP (ref 8.4–10.5)
CHLORIDE SERPL-SCNC: 100 MMOL/L — SIGNIFICANT CHANGE UP (ref 98–107)
CO2 SERPL-SCNC: 31 MMOL/L — SIGNIFICANT CHANGE UP (ref 22–31)
CREAT SERPL-MCNC: 1.42 MG/DL — HIGH (ref 0.5–1.3)
EGFR: 65 ML/MIN/1.73M2 — SIGNIFICANT CHANGE UP
EGFR: 65 ML/MIN/1.73M2 — SIGNIFICANT CHANGE UP
GLUCOSE BLDC GLUCOMTR-MCNC: 102 MG/DL — HIGH (ref 70–99)
GLUCOSE BLDC GLUCOMTR-MCNC: 104 MG/DL — HIGH (ref 70–99)
GLUCOSE BLDC GLUCOMTR-MCNC: 113 MG/DL — HIGH (ref 70–99)
GLUCOSE BLDC GLUCOMTR-MCNC: 96 MG/DL — SIGNIFICANT CHANGE UP (ref 70–99)
GLUCOSE SERPL-MCNC: 96 MG/DL — SIGNIFICANT CHANGE UP (ref 70–99)
HCT VFR BLD CALC: 27 % — LOW (ref 39–50)
HGB BLD-MCNC: 7.9 G/DL — LOW (ref 13–17)
MAGNESIUM SERPL-MCNC: 1.7 MG/DL — SIGNIFICANT CHANGE UP (ref 1.6–2.6)
MCHC RBC-ENTMCNC: 25.6 PG — LOW (ref 27–34)
MCHC RBC-ENTMCNC: 29.3 G/DL — LOW (ref 32–36)
MCV RBC AUTO: 87.7 FL — SIGNIFICANT CHANGE UP (ref 80–100)
NRBC # BLD AUTO: 0 K/UL — SIGNIFICANT CHANGE UP (ref 0–0)
NRBC # FLD: 0 K/UL — SIGNIFICANT CHANGE UP (ref 0–0)
NRBC BLD AUTO-RTO: 0 /100 WBCS — SIGNIFICANT CHANGE UP (ref 0–0)
PHOSPHATE SERPL-MCNC: 5.2 MG/DL — HIGH (ref 2.5–4.5)
PLATELET # BLD AUTO: 415 K/UL — HIGH (ref 150–400)
POTASSIUM SERPL-MCNC: 3.8 MMOL/L — SIGNIFICANT CHANGE UP (ref 3.5–5.3)
POTASSIUM SERPL-SCNC: 3.8 MMOL/L — SIGNIFICANT CHANGE UP (ref 3.5–5.3)
RBC # BLD: 3.08 M/UL — LOW (ref 4.2–5.8)
RBC # FLD: 22.3 % — HIGH (ref 10.3–14.5)
SODIUM SERPL-SCNC: 141 MMOL/L — SIGNIFICANT CHANGE UP (ref 135–145)
SURGICAL PATHOLOGY STUDY: SIGNIFICANT CHANGE UP
WBC # BLD: 7.48 K/UL — SIGNIFICANT CHANGE UP (ref 3.8–10.5)
WBC # FLD AUTO: 7.48 K/UL — SIGNIFICANT CHANGE UP (ref 3.8–10.5)

## 2025-04-18 PROCEDURE — G0545: CPT

## 2025-04-18 PROCEDURE — 99233 SBSQ HOSP IP/OBS HIGH 50: CPT

## 2025-04-18 PROCEDURE — 99232 SBSQ HOSP IP/OBS MODERATE 35: CPT

## 2025-04-18 RX ORDER — HYDROMORPHONE/SOD CHLOR,ISO/PF 2 MG/10 ML
0.5 SYRINGE (ML) INJECTION ONCE
Refills: 0 | Status: DISCONTINUED | OUTPATIENT
Start: 2025-04-18 | End: 2025-04-18

## 2025-04-18 RX ORDER — ACETAMINOPHEN 500 MG/5ML
1000 LIQUID (ML) ORAL ONCE
Refills: 0 | Status: COMPLETED | OUTPATIENT
Start: 2025-04-18 | End: 2025-04-18

## 2025-04-18 RX ADMIN — Medication 0.5 MILLIGRAM(S): at 23:46

## 2025-04-18 RX ADMIN — DULOXETINE 60 MILLIGRAM(S): 20 CAPSULE, DELAYED RELEASE ORAL at 11:53

## 2025-04-18 RX ADMIN — Medication 40 MILLIGRAM(S): at 05:33

## 2025-04-18 RX ADMIN — LIDOCAINE HYDROCHLORIDE 1 PATCH: 20 JELLY TOPICAL at 20:00

## 2025-04-18 RX ADMIN — Medication 0.5 MILLIGRAM(S): at 05:34

## 2025-04-18 RX ADMIN — Medication 1 APPLICATION(S): at 05:36

## 2025-04-18 RX ADMIN — OXYCODONE HYDROCHLORIDE 10 MILLIGRAM(S): 30 TABLET ORAL at 22:22

## 2025-04-18 RX ADMIN — Medication 0.5 MILLIGRAM(S): at 13:06

## 2025-04-18 RX ADMIN — CLOTRIMAZOLE 1 APPLICATION(S): 1 CREAM TOPICAL at 05:35

## 2025-04-18 RX ADMIN — SEVELAMER HYDROCHLORIDE 800 MILLIGRAM(S): 800 TABLET ORAL at 17:21

## 2025-04-18 RX ADMIN — Medication 25 GRAM(S): at 13:06

## 2025-04-18 RX ADMIN — MUPIROCIN CALCIUM 1 APPLICATION(S): 20 CREAM TOPICAL at 05:35

## 2025-04-18 RX ADMIN — Medication 25 GRAM(S): at 22:03

## 2025-04-18 RX ADMIN — Medication 25 GRAM(S): at 05:34

## 2025-04-18 RX ADMIN — APIXABAN 5 MILLIGRAM(S): 2.5 TABLET, FILM COATED ORAL at 05:33

## 2025-04-18 RX ADMIN — Medication 3 MILLIGRAM(S): at 22:05

## 2025-04-18 RX ADMIN — Medication 0.5 MILLIGRAM(S): at 17:40

## 2025-04-18 RX ADMIN — LIDOCAINE HYDROCHLORIDE 1 PATCH: 20 JELLY TOPICAL at 11:52

## 2025-04-18 RX ADMIN — APIXABAN 5 MILLIGRAM(S): 2.5 TABLET, FILM COATED ORAL at 17:22

## 2025-04-18 RX ADMIN — SEVELAMER HYDROCHLORIDE 800 MILLIGRAM(S): 800 TABLET ORAL at 11:53

## 2025-04-18 RX ADMIN — Medication 1 TABLET(S): at 11:53

## 2025-04-18 RX ADMIN — OXYCODONE HYDROCHLORIDE 10 MILLIGRAM(S): 30 TABLET ORAL at 16:19

## 2025-04-18 RX ADMIN — SODIUM HYPOCHLORITE 1 APPLICATION(S): 0.12 SOLUTION TOPICAL at 17:23

## 2025-04-18 RX ADMIN — Medication 1 APPLICATION(S): at 23:47

## 2025-04-18 RX ADMIN — AMLODIPINE BESYLATE 10 MILLIGRAM(S): 10 TABLET ORAL at 05:34

## 2025-04-18 RX ADMIN — Medication 400 MILLIGRAM(S): at 11:54

## 2025-04-18 RX ADMIN — QUETIAPINE FUMARATE 25 MILLIGRAM(S): 25 TABLET ORAL at 22:06

## 2025-04-18 RX ADMIN — GABAPENTIN 900 MILLIGRAM(S): 400 CAPSULE ORAL at 13:06

## 2025-04-18 RX ADMIN — CLOTRIMAZOLE 1 APPLICATION(S): 1 CREAM TOPICAL at 17:23

## 2025-04-18 RX ADMIN — SEVELAMER HYDROCHLORIDE 800 MILLIGRAM(S): 800 TABLET ORAL at 10:12

## 2025-04-18 RX ADMIN — Medication 0.5 MILLIGRAM(S): at 06:04

## 2025-04-18 RX ADMIN — OXYCODONE HYDROCHLORIDE 10 MILLIGRAM(S): 30 TABLET ORAL at 23:30

## 2025-04-18 RX ADMIN — Medication 1 APPLICATION(S): at 17:22

## 2025-04-18 RX ADMIN — GABAPENTIN 900 MILLIGRAM(S): 400 CAPSULE ORAL at 05:33

## 2025-04-18 RX ADMIN — OXYCODONE HYDROCHLORIDE 10 MILLIGRAM(S): 30 TABLET ORAL at 10:12

## 2025-04-18 RX ADMIN — MUPIROCIN CALCIUM 1 APPLICATION(S): 20 CREAM TOPICAL at 17:23

## 2025-04-18 RX ADMIN — GABAPENTIN 900 MILLIGRAM(S): 400 CAPSULE ORAL at 22:05

## 2025-04-18 RX ADMIN — Medication 1 APPLICATION(S): at 11:53

## 2025-04-18 RX ADMIN — LIDOCAINE HYDROCHLORIDE 1 PATCH: 20 JELLY TOPICAL at 23:59

## 2025-04-18 RX ADMIN — SODIUM HYPOCHLORITE 1 APPLICATION(S): 0.12 SOLUTION TOPICAL at 05:36

## 2025-04-18 NOTE — SWALLOW BEDSIDE ASSESSMENT ADULT - ADDITIONAL RECOMMENDATIONS
Medical team advised to reconsult this service if patient demonstrates change in medical status impacting oral/nutritional intake and/or change in tolerance of recommended oral diet. This service to follow as schedule permits to monitor/assess tolerance of diet.

## 2025-04-18 NOTE — PROGRESS NOTE ADULT - ASSESSMENT
This is a 40 y/o M w/ PMHx AF (not on AC), HTN, BEA, pseudotumor cerebri s/p LP in 2024, initially presented to [facility] on 2/24, SOB, LE edema with URI symptoms and sick contacts, noted to have severe ARDS, intubated however still hypoxia, cannulated for VV ECMO on 2/25, transferred to [facility] on 2/25, started on empiric Vanco, Zosyn for multifocal PNA, abdominal wall cellulitis, s/p trach placement by CT surgery on 3/7, ECMO decannulated on 3/7. Zosyn held on 3/9.  C/b fevers on 3/10, restarted on Zosyn, transferred to [facility] on 3/13, Bcx now w/ yeast.    #Sacral ulcer, with extensive tunneling, no OM on MRI, however c/f OM on WBC scan  #Candida albicans fungemia   #Fevers   #Multifocal PNA, abdominal wall cellulitis s/p Vanco/Zosyn  #ARDS, hypoxic respiratory failure requiring VV EMCO 2/2 multifocal PNA? s/p decannulation on 3/7    Overall, 40 y/o M w/ PMHx AF (not on AC), HTN, BEA, pseudotumor cerebri s/p LP in 2024 admitted to [facility] on 2/26 for ARDS, hypoxic respiratory failure requiring VV EMCO 2/2 multifocal PNA? s/p decannulation on 3/7, c/b abdominal wall cellulitis s/p Vancomycin/Zosyn, s/p trach on 3/7, in the setting of persistent fevers despite Zosyn, BCx now w/ yeast, Candida albicans   Unclear source for yeast in BCx, pt had all central lines removed on 3/7, not getting TPN, no abdominal pain on exam, PEG site well appearing.     CT A/P w/o clear abdominal source of fungemia, possibly 2/2 abdominal wall cellulitis? TTE w/o IE.  BCx 3/18 1/2 positive, 3/20 NGTD x 2.     Pt with worsening sepsis on 3/30, febrile to 103, WBC 16.   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   Pt with deep tunneled wound to left, no drainage, malodor per wound care.   CT A/P w/ b/l pulmonary opacities but improved, no sacral abscess   WBC scan with soft tissue infection R side of pelvis, intense activity in R iliac bone possible OM. Lateral R forefoot with soft tissue infection, possible OM?  Wound examined with wound care, large R buttocks soft tissue ulcer, does not look infected, however deep tunneling with murky fluid, likely source of infection   MRI w/o OM, however given WBC scan findings, will still treat for 6 weeks     S/p PEG removal and sacral ulcer debridement, gram stain w/ GNR    Recommendations;   1. Continue with Zosyn 3.375 g 8, plan on 6 week course for possible OM until 5/11. If leaving for facility would change to Cefepime/Flagyl or Ertapenem   2. S/p Caspofungin 70 mg daily, 4 week course ended 4/15  3. F/u OR studies, Providencia, bacteroides      Thank you for consulting us and involving us in the management of this patient's case. In addition to reviewing history, imaging, documents, labs, microbiology, and infection control strategies and potential issues.     ID will continue to follow    Shailesh Owens M.D.  Attending Physician  Division of Infectious Diseases  Department of Medicine    Please contact through MS Teams message.  Office: [phone] (after 5 PM or weekend)

## 2025-04-18 NOTE — SWALLOW BEDSIDE ASSESSMENT ADULT - NS ASR SWALLOW FINDINGS DISCUS
ACP Vince/Nursing/Patient
RN, Abhinav; ACP, Willis - on Unit./Nursing/Patient/Family
RN Abhinav; MAX [place] via Teams./Nursing/Patient

## 2025-04-18 NOTE — PROGRESS NOTE ADULT - ASSESSMENT
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  -Risk of intertriginous dermatitis to remaining intertriginous folds  -LUQ surgical wound s/p PEG tube removal     Plan:  -Per records initially with deep tissue pressure injury --> evolved to unstageable pressure --> stage 4 pressure injury, remains with increased moisture secondary to body habitus, fecal and urinary incontinence, history of critical illness (mechanical ventilation > 72 hours, sepsis, was on ECMO), and limited mobility.   -4/1, s/p OR debridement by general surgery down to "healthy tissue"   -4/15 sacral wound culture: No fungal tissue growth, Few Providencia stuartii Rare Bacteroides vulgatus group "Susceptibilities not performed". Organism Identification: Providencia stuartii  -Deep cavity with tunneling/undermining   -Bone palpable, not exposed, significant liquifying necrosis tissue centrally and on right buttock under undermining areas   -Unable to perform bedside debridement given deep cavity   -MRI lumbar spine- with no mention of sacral ulcer- likely unable to see given area of focus.    -Patient continues to be follow by ID Dr. Owens   -Per ID will continue with Abx to treat OM  -Topical recommendations:   .Protect posterior ears from nasal cannula with offloading medical device (Posey)  .LUQ surgical wound previous site of PEG tube: Clean wound and periwound skin with NS. Pat dry. Cover with small silicone foam with border. Change every other day or prn if soiled   .Right anterior thigh to groin isolated wound: Clean wound and periwound skin with NS. Pat dry. Apply liquid barrier film to periwound skin, allow to dry. Cover with silicone foam with border. Change daily or PRN if soiled   ·Sacral/gluteal cleft to bilateral buttocks- Irrigate deep cavity and tunneling with Dakins solution 1/4 strength using peribottle or flushes, cleanse wound and satellite ulcerations to b/l buttocks with NS, Dry well. Apply Liquid barrier film to periwound skin. Apply TRIAD barrier paste to isolated ulcer in left buttock,  Apply Aquacel hydrofiber sheet to right buttock, pack sacral/gluteal cleft including deep tunnels with Dakins 1/4 strength moistened 2" kerlix, leave at least 2" out at end to ensure full removal of packing with subsequent dressing changes. Cover entire area (sacrum and right buttock) with abdominal pad and tegaderm. Change twice a day and prn if soiled/compromised. Once dressing is in place and perineal care is provided, apply TRIAD moisture barrier cream to exposed skin every shift and prn.  ·Bilateral abdominal pannus, bilateral groin: Cleanse with luke-warm soap and water, dry well. Apply Interdry textile sheeting, under intertriginous folds leaving 2 inches exposed at ends to wick, remove to wash & dry affected area, then replace. Individual sheeting may be used for up to 5 days unless soiled. Inspect skin daily.   -Continue to offload pressure; bariatric low airloss support surface, turn and position per protocol with use of fluidized positioning devices, continue incontinence and moisture care per protocol and use of single breathable incontinence pads, continue to offload heels with fluidized positioning devices/Ochoa-Lock positioning device (PS# [id number]). Continue use of bariatric seat cushion (people soft #[id number]) and limit sit time- no more than 2 consecutive hours at any given time.   -Continue Nutritional management as per RD recommendations.  -B/l feet management per Podiatry surgery   -Appreciate PT and safe patient handling      Upon discharge follow up at outpatient [facility]. [address] [place] [address]. [phone].    Remainder of care per primary team, will continue to follow while inpatient. Please reconsult earlier if needed   Thank you.    Discussed findings and plan with primary team.  SVETLANA Pop-BC, CWCN   pager #[phone] or available in [place] teams     If after 4PM or before 7:30AM on Mon-Friday or weekend/holiday please contact general surgery for urgent matters.   Team A- [id number]   Team B- [id number]  For non-urgent matters e-mail [email or web address]    I spent 50 minutes face-to-face with this patient of which more than 50% of the time was spent counseling/coordinating care of this patient. Assessment/Plan: 40 YO M with PMHx of morbid obesity, BEA on CPAP, pAFIB (not on AC), HTN, and BL papilledema attributed to pseudotumor cerebri who presented initially to [facility] on 2/24 with SOB and BL LE edema. Found with URI outpatient with progressive SOB, and concern for noted for MFPNA on imaging. Course progressed with AHRF with worsening O2 demand, respiratory distress, secretions, and somnolence requiring intubation (difficult with success after 6 attempts) in ED and admission to [facility] MICU. While in MICU, patient noted with increasing O2 demand with no improvement despite optimal vent management. Concern noted for progressive ARDS, unable to prone given morbid obesity, and ultimately cannulated for vvECMO on 2/25 and transferred to [facility] for further management on 2/26. While at [facility], tx with diuresis and ABX, and ultimately decannulated and s/p 60XLTCP trach and PEG on 3/7. Patient ultimately transferred to [facility] on 3/11 and course complicated by recurrent high grade fevers and found with fungemia.    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 Measurements improving. Continue to monitor, if deterioration in wound, consider derm consult   -Risk of intertriginous dermatitis to remaining intertriginous folds  -LUQ surgical wound s/p PEG tube removal     Plan:  -Per records initially with deep tissue pressure injury --> evolved to unstageable pressure injury--> evolved to stage 4 pressure injury (was receiving chemical debridement with Dakins solution), remains with increased moisture secondary to body habitus, fecal and urinary incontinence, history of critical illness (mechanical ventilation > 72 hours, sepsis, was on ECMO), and limited mobility.   -4/15, s/p OR debridement by general surgery down to "healthy tissue"   -4/15 sacral wound culture: No fungal tissue growth, Few Providencia stuartii Rare Bacteroides vulgatus group "Susceptibilities not performed". Organism Identification: Providencia stuartii  -Deep cavity with tunneling/undermining   -Bone palpable, not exposed, significant liquifying fat necrosis tissue centrally and on right buttock under undermining areas   -Unable to perform bedside debridement given deep cavity and risks for bleeding outweighs benefits   -4/11, MRI Bony pelvis only without contrast- 1.  No osteomyelitis. 2.  Sacral decubitus wound is again seen extending superiorly along the superficial margin of the right gluteus zurdo to the level of L5 with small amount of fluid and gas within the tract. 3.  The underlying gluteus zurdo demonstrates a region of decreased enhancement consistent with ischemia or necrosis measuring up to 11.9 cm transversely.4.  Muscle edema suggests a nonspecific myositis.5.  Small bilateral hip joint effusions with mild synovitis is nonspecific.  -Patient continues to be follow by ID Dr. Owens. Per ID will continue with Abx to treat OM as OM is highly suspicious, on exam bone is palpable under undermining areas clinically suspect OM   -No drainable abscess today   -Wound consider VAC VERAFLOW Negative therapy to expedite wound healing and mechanical  debridement of necrotic tissue, however, at this time given abundant necrotic tissue/fat necrosis not an ideal candidate   -Topical recommendations:   .Protect posterior ears from nasal cannula with offloading medical device (Posey)  .LUQ surgical wound previous site of PEG tube: Clean wound and periwound skin with NS. Pat dry. Cover with small silicone foam with border. Change every other day or prn if soiled   .Right anterior thigh to groin isolated wound: Clean wound and periwound skin with NS. Pat dry. Apply liquid barrier film to periwound skin, allow to dry. Cover with silicone foam with border. Change daily or PRN if soiled   ·Sacral/gluteal cleft to bilateral buttocks- Irrigate deep cavity and tunneling with Dakins solution 1/4 strength using peribottle or flushes, cleanse wound and satellite ulcerations to b/l buttocks with NS, Dry well. Apply Liquid barrier film to periwound skin. Apply TRIAD barrier paste to isolated ulcer in left buttock,  Apply Aquacel hydrofiber sheet to right buttock, pack sacral/gluteal cleft including deep tunnels with Dakins 1/4 strength moistened 2" kerlix, leave at least 2" out at end to ensure full removal of packing with subsequent dressing changes. Cover entire area (sacrum and right buttock) with abdominal pad and tegaderm. Change twice a day and prn if soiled/compromised. Once dressing is in place and perineal care is provided, clean remaining skin with no rinse solution, apply TRIAD moisture barrier cream to exposed skin every shift and prn.  ·Bilateral abdominal pannus, bilateral groin: Cleanse with luke-warm soap and water, dry well. Apply Interdry textile sheeting, under intertriginous folds leaving 2 inches exposed at ends to wick, remove to wash & dry affected area, then replace. Individual sheeting may be used for up to 5 days unless soiled. Inspect skin daily.   -Continue to offload pressure; bariatric low airloss support surface, turn and position per protocol with use of fluidized positioning devices, continue incontinence and moisture care per protocol and use of single breathable incontinence pads, continue to offload heels with fluidized positioning devices/Ochoa-Lock positioning device (PS# [id number]). Continue use of bariatric seat cushion (people soft #[id number]) and limit sit time- no more than 2 consecutive hours at any given time.   -Continue Nutritional management as per RD recommendations.  -B/l feet management per Podiatry surgery   -Appreciate PT and safe patient handling      Upon discharge follow up at outpatient [facility]. [address] [place] [address]. [phone].    Remainder of care per primary team, will continue to follow while inpatient. Please reconsult earlier if needed   Thank you.    Discussed findings and plan with primary team Lulú Fontenot   Discussed findings with ID MD Shailesh Baron, [facility]-BC, [facility]   pager #[phone] or available in [place] teams     If after 4PM or before 7:30AM on Mon-Friday or weekend/holiday please contact general surgery for urgent matters.   Team A- [id number]   Team B- [id number]  For non-urgent matters e-mail [email or web address]    I spent 50 minutes face-to-face with this patient of which more than 50% of the time was spent counseling/coordinating care of this patient.

## 2025-04-18 NOTE — SWALLOW BEDSIDE ASSESSMENT ADULT - H & P REVIEW
yes Progress Note-Pulm 4/18: "38 YO M with PMHx of morbid obesity, BEA on CPAP, pAFIB (not on AC), HTN, and BL papilledema attributed to pseudotumor cerebri who presented initially to [facility] on 2/24 with SOB and BL LE edema. Found with URI outpatient with progressive SOB, and concern for noted for MFPNA on imaging. Course progressed with AHRF with worsening O2 demand, respiratory distress, secretions, and somnolence requiring intubation (difficult with success after 6 attempts) in ED and admission to [facility] MICU. While in MICU, patient noted with increasing O2 demand with no improvement despite optimal vent management. Concern noted for progressive ARDS, unable to prone given morbid obesity, and ultimately cannulated for vvECMO on 2/25 and transferred to [facility] for further management on 2/26. While at [facility], tx with diuresis and ABX, and ultimately decannulated and s/p 60XLTCP trach and PEG on 3/7. Patient ultimately transferred to [facility] on 3/11 and course complicated by recurrent high grade fevers and found with fungemia; # Dysphagia  - s/p PEG 3/7  - Attempted green dye on 3/13 and failed - Continue on TF - Continue on miralax and senna - RPT green dye passed 3/19- 3/20 Passed FEEST - on soft bite sized with thin liquids Added consistent carb 2/2 weight/Elevated A1c - PEG removed in OR DSD over site 4/15"

## 2025-04-18 NOTE — SWALLOW BEDSIDE ASSESSMENT ADULT - SWALLOW EVAL: CURRENT DIET
Soft and Bite-Size with Thin Liquids (per MD order).
NPO with PEG
NPO with Tube Feeds via PEG (per MD order).

## 2025-04-18 NOTE — SWALLOW BEDSIDE ASSESSMENT ADULT - SWALLOW EVAL: RECOMMENDED DIET
Continue NPO with nutrition/hydration/medication administration via PEG in place
1. Continue NPO with nutrition/hydration/medication administration via PEG in place.
1. Regular Solids with Thin Liquids.

## 2025-04-18 NOTE — PROGRESS NOTE ADULT - SUBJECTIVE AND OBJECTIVE BOX
[facility], [place]-- WOUND TEAM -- FOLLOW UP NOTE  --------------------------------------------------------------------------------    subjective: Patient seen and examined at bedside.     Interval HPI/24 hour events:   -->wbc wnl, afebrile   -->s/p PEG removal and sacral debridement in the OR by general surgery. Wound culture obtained post debridement   -->Patient remains on IV antibiotics per ID   -->Appreciate podiatry following for foot  wounds   Chart reviewed including labs and relevant images    Diet:  Diet, Soft and Bite Sized:   Consistent Carbohydrate Evening Snack (CSTCHOSN)  No Concentrated Phosphorus  Supplement Feeding Modality:  Oral  Ensure Max Cans or Servings Per Day:  1  Frequency:  Two Times a day (04-17-25 @ 15:15) [Active]    ROS: General/ SKIN/ see HPI  all other systems negative      ALLERGIES & MEDICATIONS  --------------------------------------------------------------------------------  Allergies    No Known Allergies    Intolerances    STANDING INPATIENT MEDICATIONS    amLODIPine   Tablet 10 milliGRAM(s) Oral daily  apixaban 5 milliGRAM(s) Oral every 12 hours  capsaicin HP 0.075% Cream 1 Application(s) Topical four times a day  chlorhexidine 2% Cloths 1 Application(s) Topical <User Schedule>  clotrimazole 1% Cream 1 Application(s) Topical two times a day  Dakins Solution - 1/4 Strength 1 Application(s) Topical two times a day  dextrose 50% Injectable 25 Gram(s) IV Push once  dextrose 50% Injectable 12.5 Gram(s) IV Push once  dextrose 50% Injectable 25 Gram(s) IV Push once  DULoxetine 60 milliGRAM(s) Oral daily  gabapentin 900 milliGRAM(s) Oral every 8 hours  glucagon  Injectable 1 milliGRAM(s) IntraMuscular once  insulin lispro (ADMELOG) corrective regimen sliding scale   SubCutaneous three times a day before meals  insulin lispro (ADMELOG) corrective regimen sliding scale   SubCutaneous at bedtime  lidocaine   4% Patch 1 Patch Transdermal daily  lidocaine   4% Patch 1 Patch Transdermal daily  melatonin 3 milliGRAM(s) Oral at bedtime  multivitamin 1 Tablet(s) Oral daily  mupirocin 2% Ointment 1 Application(s) Topical two times a day  pantoprazole    Tablet 40 milliGRAM(s) Oral before breakfast  piperacillin/tazobactam IVPB.. 3.375 Gram(s) IV Intermittent every 8 hours  polyethylene glycol 3350 17 Gram(s) Oral daily  QUEtiapine 25 milliGRAM(s) Oral at bedtime  senna 2 Tablet(s) Oral at bedtime  sevelamer carbonate 800 milliGRAM(s) Oral three times a day with meals    PN INPATIENT MEDICATION  acetaminophen     Tablet .. 650 milliGRAM(s) Oral every 6 hours PRN  albuterol/ipratropium for Nebulization 3 milliLiter(s) Nebulizer every 6 hours PRN  cyclobenzaprine 5 milliGRAM(s) Oral three times a day PRN  dextrose Oral Gel 15 Gram(s) Oral once PRN  HYDROmorphone  Injectable 0.5 milliGRAM(s) IV Push every 12 hours PRN  oxyCODONE    IR 10 milliGRAM(s) Oral every 6 hours PRN    Vital signs:  T(C): 36.4 (04-18-25 @ 05:33), Max: 36.6 (04-17-25 @ 12:00)  HR: 106 (04-18-25 @ 07:46) (97 - 111)  BP: 144/98 (04-18-25 @ 05:33) (141/96 - 160/96)  RR: 18 (04-18-25 @ 05:33) (16 - 18)  SpO2: 99% (04-18-25 @ 07:46) (97% - 100%)    Wt(kg): --197.1 (04-01-25)        LABS/ CULTURES/ RADIOLOGY:              7.9    7.48  >-----------<  415      [04-18-25 @ 06:40]              27.0     141  |  100  |  13  ----------------------------<  96      [04-18-25 @ 06:40]  3.8   |  31  |  1.42        Ca     9.7     [04-18-25 @ 06:40]      Mg     1.70     [04-18-25 @ 06:40]      Phos  5.2     [04-18-25 @ 06:40]      PTT: 31.4       [04-17-25 @ 10:38]      CAPILLARY BLOOD GLUCOSE      POCT Blood Glucose.: 96 mg/dL (18 Apr 2025 08:01)  POCT Blood Glucose.: 100 mg/dL (17 Apr 2025 22:11)  POCT Blood Glucose.: 97 mg/dL (17 Apr 2025 17:00)  POCT Blood Glucose.: 129 mg/dL (17 Apr 2025 13:16)    A1C with Estimated Average Glucose Result: 6.7 % (02-25-25 @ 05:44)      CC: [id number] EXAM:  MR PELVIS BONY ONLY WAW IC   ORDERED BY: FELICIANO CISNEROS     PROCEDURE DATE:  04/11/2025          INTERPRETATION:  MR PELVIS BONY WITHOUT AND WITH IV CONTRAST    HISTORY: Evaluate for osteomyelitis. Right iliac activity on recent bone   scan.    TECHNIQUE:  Multiplanar MRI of the pelvis was performed without and with   10 cc administered Gadavist intravenous contrast.    COMPARISON:  Nuclear medicine bone scan dated 4/8/2025. CT abdomen and   pelvis dated 4/3/2025.    FINDINGS:    OSSEOUS STRUCTURES    Fractures/Stress Reactions:  None.    Osteomyelitis: None.    VISUALIZED SPINE  S1 is transitional and lumbarized. Moderate L5-S1 degenerative disc   disease.    PELVIC JOINTS    Sacroiliac Joints:  Preserved.    Symphysis Pubis:  Preserved.    RIGHT HIP JOINT    Avascular Necrosis:  None.    Articular Cartilage:  Grossly preserved given the large field of view.    Effusion/Synovitis:  Small effusion with mild synovitis.    RIGHT HIP TENDONS    Gluteus Minimus Tendon:  Intact.    Gluteus Medius Tendon:  Mild to moderate tendinopathy.    Iliopsoas Tendon:  Intact.    Common Hamstring Tendon:  Intact.    Bursa:  None.    LEFT HIP JOINT    Avascular Necrosis:  None.    Articular Cartilage:  Grossly preserved given the large field of view.    Effusion/Synovitis:  Small effusion with mild synovitis.    LEFT HIP TENDONS    Gluteus Minimus Tendon:  Intact.    Gluteus Medius Tendon:  Mild to moderate tendinopathy.    Iliopsoas Tendon:  Intact.    Common Hamstring Tendon:  Intact.    Bursa:  None.    SOFT TISSUES    Pelvis/Abdomen:  Moderate fecal distention of the visualized sigmoid   colon.    Musculature:  Moderate to severe generalized edema of the bilateral   gluteal musculature. Mild lower paraspinal, adductor, and quadriceps   muscle edema. Sacral decubitus wound with region of decreased enhancement   of the right gluteus zurdo measuring up to 11.9 cm transversely. Tract   with small amount of fluid and gas is again seen extending superiorly   within the right gluteal subcutaneous tissues to the level of L5.    Subcutaneous Tissues:  Mild gluteal edema around the sacral wound with   the tract extending superiorly to the level of L5.    NEUROVASCULAR STRUCTURES    Sacral Neuroforamina:  Widely patent.    Neural Plexuses:  Maintained.    IMPRESSION:  1.  No osteomyelitis.  2.  Sacral decubitus wound is again seen extending superiorly along the   superficial margin of the right gluteus zurdo to the level of L5 with   small amount of fluid and gas within the tract.  3.  The underlying gluteus zurdo demonstrates a region of decreased   enhancement consistent with ischemia or necrosis measuring up to 11.9 cm   transversely.  4.  Muscle edema suggests a nonspecific myositis.  5.  Small bilateral hip joint effusions with mild synovitis is   nonspecific.    --- End of Report ---            LIZA JC MD; Attending Radiologist  This document has been electronically signed. Apr 11 2025  4:09PM      Culture - Fungal, Tissue (04.15.25 @ 09:00)   Specimen Source: Tissue SACRAL WOUND AEBNDEMENT  Culture Results:   No growth    Culture - Tissue with Gram Stain (04.15.25 @ 09:00)   Gram Stain:   Rare polymorphonuclear leukocytes per low power field   Rare Gram Negative Rods per oil power field  - Amoxicillin/Clavulanic Acid: R >16/8  - Ampicillin: R >16 These ampicillin results predict results for amoxicillin  - Ampicillin/Sulbactam: I 16/8  - Aztreonam: S <=4  - Cefazolin: R >16  - Cefepime: S <=2  - Cefoxitin: S <=8  - Ceftriaxone: S <=1  - Ciprofloxacin: R >2  - Ertapenem: S <=0.5  - Levofloxacin: R 2  - Meropenem: S <=1  - Piperacillin/Tazobactam: S <=8  - Trimethoprim/Sulfamethoxazole: S <=0.5/9.5  Specimen Source: Tissue SACRAL WOUND AEBNDEMENT  Culture Results:   Few Providencia stuartii   Rare Bacteroides vulgatus group "Susceptibilities not performed"  Organism Identification: Providencia stjosh  Organism: Ca monroy  Method Type: TRINIDAD   [facility]-- WOUND TEAM -- FOLLOW UP NOTE  --------------------------------------------------------------------------------    subjective: Patient seen and examined at bedside. Patient alert, watching TV and endorses he has been reading some Islam books. Denies pain in sacral area, reports his pain in in bilateral foot, endorses primary team tells him is do to " nerve pain" Endorses he feels he is getting stronger and continues to get out of bed to the chair twice a day.     Interval HPI/24 hour events:   -->wbc wnl, afebrile   -->s/p PEG removal and sacral debridement in the OR by general surgery. Wound culture obtained post debridement   -->Patient remains on IV antibiotics per ID   -->Appreciate podiatry following for foot  wounds   Chart reviewed including labs and relevant images    Diet:  Diet, Soft and Bite Sized:   Consistent Carbohydrate Evening Snack (CSTCHOSN)  No Concentrated Phosphorus  Supplement Feeding Modality:  Oral  Ensure Max Cans or Servings Per Day:  1  Frequency:  Two Times a day (04-17-25 @ 15:15) [Active]    ROS: General/ SKIN/ see HPI  all other systems negative    ALLERGIES & MEDICATIONS  --------------------------------------------------------------------------------  Allergies    No Known Allergies    Intolerances    STANDING INPATIENT MEDICATIONS    amLODIPine   Tablet 10 milliGRAM(s) Oral daily  apixaban 5 milliGRAM(s) Oral every 12 hours  capsaicin HP 0.075% Cream 1 Application(s) Topical four times a day  chlorhexidine 2% Cloths 1 Application(s) Topical <User Schedule>  clotrimazole 1% Cream 1 Application(s) Topical two times a day  Dakins Solution - 1/4 Strength 1 Application(s) Topical two times a day  dextrose 50% Injectable 25 Gram(s) IV Push once  dextrose 50% Injectable 12.5 Gram(s) IV Push once  dextrose 50% Injectable 25 Gram(s) IV Push once  DULoxetine 60 milliGRAM(s) Oral daily  gabapentin 900 milliGRAM(s) Oral every 8 hours  glucagon  Injectable 1 milliGRAM(s) IntraMuscular once  insulin lispro (ADMELOG) corrective regimen sliding scale   SubCutaneous three times a day before meals  insulin lispro (ADMELOG) corrective regimen sliding scale   SubCutaneous at bedtime  lidocaine   4% Patch 1 Patch Transdermal daily  lidocaine   4% Patch 1 Patch Transdermal daily  melatonin 3 milliGRAM(s) Oral at bedtime  multivitamin 1 Tablet(s) Oral daily  mupirocin 2% Ointment 1 Application(s) Topical two times a day  pantoprazole    Tablet 40 milliGRAM(s) Oral before breakfast  piperacillin/tazobactam IVPB.. 3.375 Gram(s) IV Intermittent every 8 hours  polyethylene glycol 3350 17 Gram(s) Oral daily  QUEtiapine 25 milliGRAM(s) Oral at bedtime  senna 2 Tablet(s) Oral at bedtime  sevelamer carbonate 800 milliGRAM(s) Oral three times a day with meals    PN INPATIENT MEDICATION  acetaminophen     Tablet .. 650 milliGRAM(s) Oral every 6 hours PRN  albuterol/ipratropium for Nebulization 3 milliLiter(s) Nebulizer every 6 hours PRN  cyclobenzaprine 5 milliGRAM(s) Oral three times a day PRN  dextrose Oral Gel 15 Gram(s) Oral once PRN  HYDROmorphone  Injectable 0.5 milliGRAM(s) IV Push every 12 hours PRN  oxyCODONE    IR 10 milliGRAM(s) Oral every 6 hours PRN    Vital signs:  T(C): 36.4 (04-18-25 @ 05:33), Max: 36.6 (04-17-25 @ 12:00)  HR: 106 (04-18-25 @ 07:46) (97 - 111)  BP: 144/98 (04-18-25 @ 05:33) (141/96 - 160/96)  RR: 18 (04-18-25 @ 05:33) (16 - 18)  SpO2: 99% (04-18-25 @ 07:46) (97% - 100%)    Wt(kg): --197.1 (04-01-25)    Constitutional: NAD, A&OX3. Morbidly Obese. Patient able to assist with turning  (+) bariatric low airloss support surface, (+) fluidized positioning devices, bariatric seat cushion on chair, heels slightly offloaded with pillow  HEENT: NC/AT, non icteric, mucosa moist. Tracheostomy site with small gauze and tegaderm dressing c/d/i  Cardiovascular: tachycardic   Respiratory: supplemental oxygen via NC, nonlabored, equal chest expansion.  Gastrointestinal: soft NT/ND. Obese   LUQ surgical wound s/p PEG tube removal- 0.4cmx0.4cmx0.2cm, tissue type exposing pink granular tissue, periwound skin with hyperpigmentation. No increased warmth, no erythema, no induration   Increase moisture beneath ABD pannus and bilateral groin, right anterior thigh to right groin (within fold) isolated wound measuring 1cmx0.7cmx0.1cm, wound with pink dermis slightly protruding above dermis (prev denuded epidermis [id number]) - moist base, no drainage, periwound intact, no edema, no erythema, no increased warmth, no induration, no fluctuance, no crepitus.  No BM during exam. (+) flatus passing.   : penile pouch in place, seal intact.  Musculoskeletal:  aROM to b/l UE, b/l le limited aROM, requires 1 person assistance with turning and positioning (patient requesting to lift his leg when turning), no gross deformities or contractures.  Vascular: Deferred, to Podiatry, b/l feet dressings c/d/i.  Skin:  moist w/ good turgor.   Intertriginous folds of posterior trunk along bilateral flank risks for moisture, skin intact  Sacrum/gluteal cleft and bilateral buttocks Stage 4 complicated by moisture and incontinence (pt turned to left  side)  Sacral/gluteal cleft to right buttock (measure in cluster, today wounds  by 1cm of reepithelialized bridge [id number]. Tunneling at 1 o'clock extends 10cm, Undermining from 3-5 o'clock towards right buttock extending 7.5cm, small narrower tunneling/depth at 7 o'clock extends 2.5cm towards left buttock (prev [id number] (stable),wound bed with fragile adipose tissue, liquefying fat necrosis, yellow moist slough  from wound bed, gray/tan eschar centrally, and right buttock with 100% pink granular/adipose tissue and fibrinous tissue. Some liquifying necrosis easily removed with wound cleansing and irrigation. Periwound skin with hyperkeratosis and soft tissue defect.   Right lower buttock linear denuded epidermis measuring 0.5cmx3.5cmx0.2cm, tissue type exposing pink moist dermis, periwound skin with hypopigmentation   Left buttock shallow ulceration- 1cmx1.5cmx0.2cm (prev 1.5cmx2.5cmx0.2cm)  Tissue type of satellite ulcerations 100% pink-moist dermis and re-epithelialization migrating from wound edges.  Periwound skin with hyperpigmentation without obvious erythema, no edema, no increased warmth, no induration, no fluctuance, no crepitus.    Psych: calm and cooperative pleasant, smiling     LABS/ CULTURES/ RADIOLOGY:              7.9    7.48  >-----------<  415      [04-18-25 @ 06:40]              27.0     141  |  100  |  13  ----------------------------<  96      [04-18-25 @ 06:40]  3.8   |  31  |  1.42        Ca     9.7     [04-18-25 @ 06:40]      Mg     1.70     [04-18-25 @ 06:40]      Phos  5.2     [04-18-25 @ 06:40]      PTT: 31.4       [04-17-25 @ 10:38]      CAPILLARY BLOOD GLUCOSE      POCT Blood Glucose.: 96 mg/dL (18 Apr 2025 08:01)  POCT Blood Glucose.: 100 mg/dL (17 Apr 2025 22:11)  POCT Blood Glucose.: 97 mg/dL (17 Apr 2025 17:00)  POCT Blood Glucose.: 129 mg/dL (17 Apr 2025 13:16)    A1C with Estimated Average Glucose Result: 6.7 % (02-25-25 @ 05:44)      CC: [id number] EXAM:  MR PELVIS BONY ONLY WAW IC   ORDERED BY: FELICIANO CISNEROS     PROCEDURE DATE:  04/11/2025          INTERPRETATION:  MR PELVIS BONY WITHOUT AND WITH IV CONTRAST    HISTORY: Evaluate for osteomyelitis. Right iliac activity on recent bone   scan.    TECHNIQUE:  Multiplanar MRI of the pelvis was performed without and with   10 cc administered Gadavist intravenous contrast.    COMPARISON:  Nuclear medicine bone scan dated 4/8/2025. CT abdomen and   pelvis dated 4/3/2025.    FINDINGS:    OSSEOUS STRUCTURES    Fractures/Stress Reactions:  None.    Osteomyelitis: None.    VISUALIZED SPINE  S1 is transitional and lumbarized. Moderate L5-S1 degenerative disc   disease.    PELVIC JOINTS    Sacroiliac Joints:  Preserved.    Symphysis Pubis:  Preserved.    RIGHT HIP JOINT    Avascular Necrosis:  None.    Articular Cartilage:  Grossly preserved given the large field of view.    Effusion/Synovitis:  Small effusion with mild synovitis.    RIGHT HIP TENDONS    Gluteus Minimus Tendon:  Intact.    Gluteus Medius Tendon:  Mild to moderate tendinopathy.    Iliopsoas Tendon:  Intact.    Common Hamstring Tendon:  Intact.    Bursa:  None.    LEFT HIP JOINT    Avascular Necrosis:  None.    Articular Cartilage:  Grossly preserved given the large field of view.    Effusion/Synovitis:  Small effusion with mild synovitis.    LEFT HIP TENDONS    Gluteus Minimus Tendon:  Intact.    Gluteus Medius Tendon:  Mild to moderate tendinopathy.    Iliopsoas Tendon:  Intact.    Common Hamstring Tendon:  Intact.    Bursa:  None.    SOFT TISSUES    Pelvis/Abdomen:  Moderate fecal distention of the visualized sigmoid   colon.    Musculature:  Moderate to severe generalized edema of the bilateral   gluteal musculature. Mild lower paraspinal, adductor, and quadriceps   muscle edema. Sacral decubitus wound with region of decreased enhancement   of the right gluteus zurdo measuring up to 11.9 cm transversely. Tract   with small amount of fluid and gas is again seen extending superiorly   within the right gluteal subcutaneous tissues to the level of L5.    Subcutaneous Tissues:  Mild gluteal edema around the sacral wound with   the tract extending superiorly to the level of L5.    NEUROVASCULAR STRUCTURES    Sacral Neuroforamina:  Widely patent.    Neural Plexuses:  Maintained.    IMPRESSION:  1.  No osteomyelitis.  2.  Sacral decubitus wound is again seen extending superiorly along the   superficial margin of the right gluteus zurdo to the level of L5 with   small amount of fluid and gas within the tract.  3.  The underlying gluteus zurdo demonstrates a region of decreased   enhancement consistent with ischemia or necrosis measuring up to 11.9 cm   transversely.  4.  Muscle edema suggests a nonspecific myositis.  5.  Small bilateral hip joint effusions with mild synovitis is   nonspecific.    --- End of Report ---            LIZA JC MD; Attending Radiologist  This document has been electronically signed. Apr 11 2025  4:09PM      Culture - Fungal, Tissue (04.15.25 @ 09:00)   Specimen Source: Tissue SACRAL WOUND AEBNDEMENT  Culture Results:   No growth    Culture - Tissue with Gram Stain (04.15.25 @ 09:00)   Gram Stain:   Rare polymorphonuclear leukocytes per low power field   Rare Gram Negative Rods per oil power field  - Amoxicillin/Clavulanic Acid: R >16/8  - Ampicillin: R >16 These ampicillin results predict results for amoxicillin  - Ampicillin/Sulbactam: I 16/8  - Aztreonam: S <=4  - Cefazolin: R >16  - Cefepime: S <=2  - Cefoxitin: S <=8  - Ceftriaxone: S <=1  - Ciprofloxacin: R >2  - Ertapenem: S <=0.5  - Levofloxacin: R 2  - Meropenem: S <=1  - Piperacillin/Tazobactam: S <=8  - Trimethoprim/Sulfamethoxazole: S <=0.5/9.5  Specimen Source: Tissue SACRAL WOUND AEBNDEMENT  Culture Results:   Few Providencia stuartii   Rare Bacteroides vulgatus group "Susceptibilities not performed"  Organism Identification: Providencia stuartii  Organism: Providencia stuartii  Method Type: TRINIDAD

## 2025-04-18 NOTE — SWALLOW BEDSIDE ASSESSMENT ADULT - DIET PRIOR TO ADMI
Regular with Thin Liquids (per patient)
Regular Solids with Thin Liquids (per patient report).
Regular Solids with Thin Liquids (per patient).
Steady reciprocal gait.

## 2025-04-18 NOTE — PROGRESS NOTE ADULT - NS ATTEND AMEND GEN_ALL_CORE FT
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     #Acute hypoxemic and hypercapnic respiratory failure s/p tracheostomy placement   - Decannulated 3/28, continues to use Bilevel NIV qhs    #RV failure 2/2 pulmHTN   - Maintaining euvolemia     #AFib - rate controlled     #DVT right IJ and bilateral peroneal veins - AC for DVT and AFib, held for surgery, - no significant hemorrhage, transition to Eliquis    #Transaminitis improving  -Hepatomegaly will need to follow as outpatient    #Fungemia  #Sacral ulceration with induration  - NM WBC scan performed  concerning for active infection with possible osteomyelitis of right pelvis/iliac bone. MRI unrevealing but given high clinical suspicion treating as OM. Pt will need long-term Abx, on Zosyn per ID until 5/11. Pt on caspofungin 4/15. Surgery consulted  - patient sp OR wound debridement on 4/15    #Pain  - Neuropathic pain  Evaluated by chronic pain service 4/8 as patient continues to have uncontrolled pain in the LE - Gabapentin now 900 q8h, Flexeril and increased to Cymbalta 60 mg po daily.  oxycodone 10 q6h prn, breakthrough Dilaudid 0.5 mg IV q12h for dressing changes. Will attempt to transition to po regimen   - also sp debridement of sacral wound   - pain service evaluated.

## 2025-04-18 NOTE — PROGRESS NOTE ADULT - SUBJECTIVE AND OBJECTIVE BOX
CHIEF COMPLAINT: Patient is a 39y old  Male who presents with a chief complaint of sob (02 Mar 2025 06:19)      INTERVAL EVENTS: No acute events overnight.    REVIEW OF SYSTEMS: Seen and evaluated by bedside during AM rounds.        Arterial Blood Gas:  04-17 @ 12:31  7.44/52/149/35/100.0/9.4  ABG lactate: --      OBJECTIVE:  ICU Vital Signs Last 24 Hrs  T(C): 36.4 (17 Apr 2025 21:31), Max: 36.6 (17 Apr 2025 12:00)  T(F): 97.6 (17 Apr 2025 21:31), Max: 97.9 (17 Apr 2025 16:00)  HR: 104 (18 Apr 2025 03:50) (86 - 111)  BP: 159/96 (17 Apr 2025 21:31) (141/96 - 160/96)  BP(mean): --  ABP: --  ABP(mean): --  RR: 17 (17 Apr 2025 21:31) (16 - 18)  SpO2: 100% (18 Apr 2025 03:50) (97% - 100%)    O2 Parameters below as of 17 Apr 2025 21:31  Patient On (Oxygen Delivery Method): nasal cannula  O2 Flow (L/min): 2            04-16 @ 07:01  -  04-17 @ 07:00  --------------------------------------------------------  IN: 0 mL / OUT: 700 mL / NET: -700 mL      CAPILLARY BLOOD GLUCOSE      POCT Blood Glucose.: 100 mg/dL (17 Apr 2025 22:11)      HOSPITAL MEDICATIONS:  MEDICATIONS  (STANDING):  amLODIPine   Tablet 10 milliGRAM(s) Oral daily  apixaban 5 milliGRAM(s) Oral every 12 hours  capsaicin HP 0.075% Cream 1 Application(s) Topical four times a day  chlorhexidine 2% Cloths 1 Application(s) Topical <User Schedule>  clotrimazole 1% Cream 1 Application(s) Topical two times a day  Dakins Solution - 1/4 Strength 1 Application(s) Topical two times a day  dextrose 50% Injectable 25 Gram(s) IV Push once  dextrose 50% Injectable 12.5 Gram(s) IV Push once  dextrose 50% Injectable 25 Gram(s) IV Push once  DULoxetine 60 milliGRAM(s) Oral daily  gabapentin 900 milliGRAM(s) Oral every 8 hours  glucagon  Injectable 1 milliGRAM(s) IntraMuscular once  insulin lispro (ADMELOG) corrective regimen sliding scale   SubCutaneous three times a day before meals  insulin lispro (ADMELOG) corrective regimen sliding scale   SubCutaneous at bedtime  lidocaine   4% Patch 1 Patch Transdermal daily  lidocaine   4% Patch 1 Patch Transdermal daily  melatonin 3 milliGRAM(s) Oral at bedtime  multivitamin 1 Tablet(s) Oral daily  mupirocin 2% Ointment 1 Application(s) Topical two times a day  pantoprazole    Tablet 40 milliGRAM(s) Oral before breakfast  piperacillin/tazobactam IVPB.. 3.375 Gram(s) IV Intermittent every 8 hours  polyethylene glycol 3350 17 Gram(s) Oral daily  QUEtiapine 25 milliGRAM(s) Oral at bedtime  senna 2 Tablet(s) Oral at bedtime  sevelamer carbonate 800 milliGRAM(s) Oral three times a day with meals    MEDICATIONS  (PRN):  acetaminophen     Tablet .. 650 milliGRAM(s) Oral every 6 hours PRN Temp greater or equal to 38C (100.4F), Mild Pain (1 - 3), Moderate Pain (4 - 6)  albuterol/ipratropium for Nebulization 3 milliLiter(s) Nebulizer every 6 hours PRN Shortness of Breath and/or Wheezing  cyclobenzaprine 5 milliGRAM(s) Oral three times a day PRN Muscle Spasm  dextrose Oral Gel 15 Gram(s) Oral once PRN Blood Glucose LESS THAN 70 milliGRAM(s)/deciliter  HYDROmorphone  Injectable 0.5 milliGRAM(s) IV Push every 12 hours PRN dressing changes and severe breakthrough  oxyCODONE    IR 10 milliGRAM(s) Oral every 6 hours PRN Severe Pain (7 - 10)      PHYSICAL EXAMINATION  General: Alert and cooperative. Resting comfortably. No apparent distress noted. Dry mucous membrane noted. White conjunctiva, no icterus.   HEENT: Normocephalic/atraumatic. EOMI. No discharge, conjunctivitis, or scleral icterus. No ptosis. No discharge or sinus tenderness noted. Mucous membranes are pink without bleeding. Tonsils are not enlarged. Good dental hygiene noted. No facial asymmetry. Neck is supple with a full range of motion. No masses or tenderness. Trachea is midline. Lymph nodes are not enlarged.   Cardiovascular: Normal rate and regular rhythm. No murmur/gallop.   Respiratory: Breath sounds clear and equal bilaterally without rales, wheezing, or rhonchi. No accessory muscles used.   Abdomen: Soft, nontender, nondistended. Bowel sounds are present. No bruit. No hepatosplenomegaly or masses. No rebound or guarding.   Skin: Warm to touch. No rashes, wounds, or skin lesions noted.   Extremities: +2 dorsalis pedis pulse is noted for the lower extremities. Full range of motion on all four extremities. 5/5 motor strength noted in all extremities. No clubbing, cyanosis, edema, or varicose veins.   MSK: No swelling or deformity. Posture, body symmetry, and movements are appropriate.   Neuro: AxO x 3, CN II - XII grossly intact.    LABS:                        10.1   9.07  )-----------( 419      ( 17 Apr 2025 10:38 )             34.1     04-17    137  |  96[L]  |  14  ----------------------------<  89  4.7   |  25  |  1.34[H]    Ca    10.1      17 Apr 2025 06:30  Phos  5.8     04-17  Mg     1.80     04-17      PTT - ( 17 Apr 2025 10:38 )  PTT:31.4 sec  Urinalysis Basic - ( 17 Apr 2025 06:30 )    Color: x / Appearance: x / SG: x / pH: x  Gluc: 89 mg/dL / Ketone: x  / Bili: x / Urobili: x   Blood: x / Protein: x / Nitrite: x   Leuk Esterase: x / RBC: x / WBC x   Sq Epi: x / Non Sq Epi: x / Bacteria: x      Arterial Blood Gas:  04-17 @ 12:31  7.44/52/149/35/100.0/9.4  ABG lactate: --        PAST MEDICAL & SURGICAL HISTORY:  HTN (hypertension)      Atrial fibrillation  paroxysmal      Papilledema of both eyes      Chronic sinusitis      Morbid obesity      No significant past surgical history          FAMILY HISTORY:      Social History:  sexually active with female partner occasionally (26 Feb 2025 01:48)      RADIOLOGY:  [ ] Reviewed and interpreted by me    PULMONARY FUNCTION TESTS:    EKG: CHIEF COMPLAINT: Patient is a 39y old  Male who presents with a chief complaint of sob    INTERVAL EVENTS: No acute events overnight.    REVIEW OF SYSTEMS: Seen and evaluated by bedside during AM rounds. Pt endorses constant b/l foot pain.       Arterial Blood Gas:  04-17 @ 12:31  7.44/52/149/35/100.0/9.4  ABG lactate: --      OBJECTIVE:  ICU Vital Signs Last 24 Hrs  T(C): 36.4 (17 Apr 2025 21:31), Max: 36.6 (17 Apr 2025 12:00)  T(F): 97.6 (17 Apr 2025 21:31), Max: 97.9 (17 Apr 2025 16:00)  HR: 104 (18 Apr 2025 03:50) (86 - 111)  BP: 159/96 (17 Apr 2025 21:31) (141/96 - 160/96)  BP(mean): --  ABP: --  ABP(mean): --  RR: 17 (17 Apr 2025 21:31) (16 - 18)  SpO2: 100% (18 Apr 2025 03:50) (97% - 100%)    O2 Parameters below as of 17 Apr 2025 21:31  Patient On (Oxygen Delivery Method): nasal cannula  O2 Flow (L/min): 2            04-16 @ 07:01  -  04-17 @ 07:00  --------------------------------------------------------  IN: 0 mL / OUT: 700 mL / NET: -700 mL      CAPILLARY BLOOD GLUCOSE      POCT Blood Glucose.: 100 mg/dL (17 Apr 2025 22:11)      HOSPITAL MEDICATIONS:  MEDICATIONS  (STANDING):  amLODIPine   Tablet 10 milliGRAM(s) Oral daily  apixaban 5 milliGRAM(s) Oral every 12 hours  capsaicin HP 0.075% Cream 1 Application(s) Topical four times a day  chlorhexidine 2% Cloths 1 Application(s) Topical <User Schedule>  clotrimazole 1% Cream 1 Application(s) Topical two times a day  Dakins Solution - 1/4 Strength 1 Application(s) Topical two times a day  dextrose 50% Injectable 25 Gram(s) IV Push once  dextrose 50% Injectable 12.5 Gram(s) IV Push once  dextrose 50% Injectable 25 Gram(s) IV Push once  DULoxetine 60 milliGRAM(s) Oral daily  gabapentin 900 milliGRAM(s) Oral every 8 hours  glucagon  Injectable 1 milliGRAM(s) IntraMuscular once  insulin lispro (ADMELOG) corrective regimen sliding scale   SubCutaneous three times a day before meals  insulin lispro (ADMELOG) corrective regimen sliding scale   SubCutaneous at bedtime  lidocaine   4% Patch 1 Patch Transdermal daily  lidocaine   4% Patch 1 Patch Transdermal daily  melatonin 3 milliGRAM(s) Oral at bedtime  multivitamin 1 Tablet(s) Oral daily  mupirocin 2% Ointment 1 Application(s) Topical two times a day  pantoprazole    Tablet 40 milliGRAM(s) Oral before breakfast  piperacillin/tazobactam IVPB.. 3.375 Gram(s) IV Intermittent every 8 hours  polyethylene glycol 3350 17 Gram(s) Oral daily  QUEtiapine 25 milliGRAM(s) Oral at bedtime  senna 2 Tablet(s) Oral at bedtime  sevelamer carbonate 800 milliGRAM(s) Oral three times a day with meals    MEDICATIONS  (PRN):  acetaminophen     Tablet .. 650 milliGRAM(s) Oral every 6 hours PRN Temp greater or equal to 38C (100.4F), Mild Pain (1 - 3), Moderate Pain (4 - 6)  albuterol/ipratropium for Nebulization 3 milliLiter(s) Nebulizer every 6 hours PRN Shortness of Breath and/or Wheezing  cyclobenzaprine 5 milliGRAM(s) Oral three times a day PRN Muscle Spasm  dextrose Oral Gel 15 Gram(s) Oral once PRN Blood Glucose LESS THAN 70 milliGRAM(s)/deciliter  HYDROmorphone  Injectable 0.5 milliGRAM(s) IV Push every 12 hours PRN dressing changes and severe breakthrough  oxyCODONE    IR 10 milliGRAM(s) Oral every 6 hours PRN Severe Pain (7 - 10)      PHYSICAL EXAMINATION  General: Alert and cooperative. Sitting in bed, resting comfortably. No apparent distress noted.   HEENT: Normocephalic/atraumatic.   Cardiovascular: Regular rhythm. + Sinus tachycardia    Respiratory: + NC. Breath sounds clear w/ diminished bases bilaterally without wheezing. No accessory muscles used.   Abdomen: + Central obesity. Soft, nontender, nondistended.   Skin: Warm to touch.   Extremities: + Full range of motion on upper extremities. Unable to move lower extremities. B/L lower extremities wrapped with gauze   Neuro: AxO x 3. Follow commands.       LABS:                        10.1   9.07  )-----------( 419      ( 17 Apr 2025 10:38 )             34.1     04-17    137  |  96[L]  |  14  ----------------------------<  89  4.7   |  25  |  1.34[H]    Ca    10.1      17 Apr 2025 06:30  Phos  5.8     04-17  Mg     1.80     04-17      PTT - ( 17 Apr 2025 10:38 )  PTT:31.4 sec  Urinalysis Basic - ( 17 Apr 2025 06:30 )    Color: x / Appearance: x / SG: x / pH: x  Gluc: 89 mg/dL / Ketone: x  / Bili: x / Urobili: x   Blood: x / Protein: x / Nitrite: x   Leuk Esterase: x / RBC: x / WBC x   Sq Epi: x / Non Sq Epi: x / Bacteria: x      Arterial Blood Gas:  04-17 @ 12:31  7.44/52/149/35/100.0/9.4  ABG lactate: --        PAST MEDICAL & SURGICAL HISTORY:  HTN (hypertension)      Atrial fibrillation  paroxysmal      Papilledema of both eyes      Chronic sinusitis      Morbid obesity      No significant past surgical history          FAMILY HISTORY:      Social History:  sexually active with female partner occasionally (26 Feb 2025 01:48)      RADIOLOGY:  [ ] Reviewed and interpreted by me    PULMONARY FUNCTION TESTS:    EKG:

## 2025-04-18 NOTE — PROGRESS NOTE ADULT - SUBJECTIVE AND OBJECTIVE BOX
Patient is a 39y old  Male who presents with a chief complaint of sob (02 Mar 2025 06:19)       INTERVAL HPI/OVERNIGHT EVENTS:  Patient seen and evaluated at bedside.  Pt is resting comfortable in NAD. Denies N/V/F/C.  Pain rated at X/10    Allergies    No Known Allergies    Intolerances        Vital Signs Last 24 Hrs  T(C): 36.8 (18 Apr 2025 09:50), Max: 36.8 (18 Apr 2025 09:50)  T(F): 98.2 (18 Apr 2025 09:50), Max: 98.2 (18 Apr 2025 09:50)  HR: 100 (18 Apr 2025 11:21) (97 - 111)  BP: 152/94 (18 Apr 2025 09:50) (144/98 - 160/96)  BP(mean): --  RR: 20 (18 Apr 2025 09:50) (17 - 20)  SpO2: 100% (18 Apr 2025 11:21) (97% - 100%)    Parameters below as of 18 Apr 2025 09:50  Patient On (Oxygen Delivery Method): nasal cannula  O2 Flow (L/min): 2      LABS:                        7.9    7.48  )-----------( 415      ( 18 Apr 2025 06:40 )             27.0     04-18    141  |  100  |  13  ----------------------------<  96  3.8   |  31  |  1.42[H]    Ca    9.7      18 Apr 2025 06:40  Phos  5.2     04-18  Mg     1.70     04-18      PTT - ( 17 Apr 2025 10:38 )  PTT:31.4 sec  Urinalysis Basic - ( 18 Apr 2025 06:40 )    Color: x / Appearance: x / SG: x / pH: x  Gluc: 96 mg/dL / Ketone: x  / Bili: x / Urobili: x   Blood: x / Protein: x / Nitrite: x   Leuk Esterase: x / RBC: x / WBC x   Sq Epi: x / Non Sq Epi: x / Bacteria: x      CAPILLARY BLOOD GLUCOSE      POCT Blood Glucose.: 102 mg/dL (18 Apr 2025 11:11)  POCT Blood Glucose.: 96 mg/dL (18 Apr 2025 08:01)  POCT Blood Glucose.: 100 mg/dL (17 Apr 2025 22:11)  POCT Blood Glucose.: 97 mg/dL (17 Apr 2025 17:00)      Lower Extremity Physical Exam:  Vascular: DP 1/4 B/L, PT 0/4, B/L secondary to +3 pitting edema, CFT <3 seconds B/L, Temperature gradient warm to cool, B/L.   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 Right foot plantar lateral forefoot wound to subQ, no malodor, no pus, no acute signs of infection. Left foot digit 1,2,4 partial thickness dry gangrene. Left foot lateral plantar forefoot wound to subQ, no malodor, no pus, no acute signs of infection. improving in appearance L>R    RADIOLOGY & ADDITIONAL TESTS:

## 2025-04-18 NOTE — PROGRESS NOTE ADULT - SUBJECTIVE AND OBJECTIVE BOX
Infectious Diseases Follow Up:    Patient is a 39y old  Male who presents with a chief complaint of sob (02 Mar 2025 06:19)      Interval History/ROS:  No acute events    Allergies  No Known Allergies        ANTIMICROBIALS:  piperacillin/tazobactam IVPB.. 3.375 every 8 hours      Current Abx:     Previous Abx     OTHER MEDS:  MEDICATIONS  (STANDING):  acetaminophen     Tablet .. 650 every 6 hours PRN  albuterol/ipratropium for Nebulization 3 every 6 hours PRN  amLODIPine   Tablet 10 daily  apixaban 5 every 12 hours  cyclobenzaprine 5 three times a day PRN  dextrose 50% Injectable 25 once  dextrose 50% Injectable 12.5 once  dextrose 50% Injectable 25 once  dextrose Oral Gel 15 once PRN  DULoxetine 60 daily  gabapentin 900 every 8 hours  glucagon  Injectable 1 once  HYDROmorphone  Injectable 0.5 every 12 hours PRN  insulin lispro (ADMELOG) corrective regimen sliding scale  three times a day before meals  insulin lispro (ADMELOG) corrective regimen sliding scale  at bedtime  melatonin 3 at bedtime  oxyCODONE    IR 10 every 6 hours PRN  pantoprazole    Tablet 40 before breakfast  polyethylene glycol 3350 17 daily  QUEtiapine 25 at bedtime  senna 2 at bedtime      Vital Signs Last 24 Hrs  T(C): 36.4 (18 Apr 2025 05:33), Max: 36.6 (17 Apr 2025 12:00)  T(F): 97.5 (18 Apr 2025 05:33), Max: 97.9 (17 Apr 2025 16:00)  HR: 106 (18 Apr 2025 07:46) (97 - 111)  BP: 144/98 (18 Apr 2025 05:33) (141/96 - 160/96)  BP(mean): --  RR: 18 (18 Apr 2025 05:33) (16 - 18)  SpO2: 99% (18 Apr 2025 07:46) (97% - 100%)    Parameters below as of 18 Apr 2025 05:33  Patient On (Oxygen Delivery Method): nasal cannula  O2 Flow (L/min): 2      PHYSICAL EXAM:  GENERAL: NAD  HEAD:  Atraumatic, Normocephalic  EYES: EOMI, conjunctiva and sclera clear  CHEST/LUNG: On NC, CTAB  HEART: RRR  ABDOMEN: Soft, Nontender, Nondistended  Extremities: B/l feet wrapped   PSYCH: AAOx3                          7.9    7.48  )-----------( 415      ( 18 Apr 2025 06:40 )             27.0       04-18    141  |  100  |  13  ----------------------------<  96  3.8   |  31  |  1.42[H]    Ca    9.7      18 Apr 2025 06:40  Phos  5.2     04-18  Mg     1.70     04-18        Urinalysis Basic - ( 18 Apr 2025 06:40 )    Color: x / Appearance: x / SG: x / pH: x  Gluc: 96 mg/dL / Ketone: x  / Bili: x / Urobili: x   Blood: x / Protein: x / Nitrite: x   Leuk Esterase: x / RBC: x / WBC x   Sq Epi: x / Non Sq Epi: x / Bacteria: x        MICROBIOLOGY:  v  Tissue SACRAL WOUND AEBNDEMENT  04-15-25   Few Providencia stuartii  Rare Bacteroides vulgatus group "Susceptibilities not performed"  --  Providencia stuartii      Blood Blood-Venous  04-07-25   No growth at 5 days  --  --      Blood Blood-Peripheral  04-07-25   No growth at 5 days  --  --      Clean Catch Clean Catch (Midstream)  03-31-25   <10,000 CFU/mL Normal Urogenital Kyle  --  --      Blood Blood-Peripheral  03-30-25   No growth at 5 days  --  --      Trach Asp Tracheal Aspirate  03-21-25   Commensal kyle consistent with body site  --    Few polymorphonuclear leukocytes per low power field  No Squamous epithelial cells per low power field  No organisms seen per oil power field      Blood Blood-Peripheral  03-20-25   No growth at 5 days  --  --      Blood Blood-Venous  03-20-25   No growth at 5 days  --  --                RADIOLOGY:

## 2025-04-18 NOTE — SWALLOW BEDSIDE ASSESSMENT ADULT - PHARYNGEAL PHASE
Immediate tracheal suctioning completed with return of trace-mild green-tinged secretions, suggestive of Gross Aspiration./Delayed pharyngeal swallow
Within functional limits
Within functional limits

## 2025-04-18 NOTE — PROGRESS NOTE ADULT - ASSESSMENT
40 YO M with PMHx of morbid obesity, BEA on CPAP, pAFIB (not on AC), HTN, and BL papilledema attributed to pseudotumor cerebri who presented initially to [facility] on 2/24 with SOB and BL LE edema. Found with URI outpatient with progressive SOB, and concern for noted for MFPNA on imaging. Course progressed with AHRF with worsening O2 demand, respiratory distress, secretions, and somnolence requiring intubation (difficult with success after 6 attempts) in ED and admission to [facility] MICU. While in MICU, patient noted with increasing O2 demand with no improvement despite optimal vent management. Concern noted for progressive ARDS, unable to prone given morbid obesity, and ultimately cannulated for vvECMO on 2/25 and transferred to [facility] for further management on 2/26. While at [facility], tx with diuresis and ABX, and ultimately decannulated and s/p 60XLTCP trach and PEG on 3/7. Patient ultimately transferred to [facility] on 3/11 and course complicated by recurrent high grade fevers and found with fungemia.    NEUROLOGY  # Hx of Pseudotumor Cerebri   - s/p LP in 2024 with high opening pressure  - s/p MRI 2024 with possible pituitary mass but unclear because of pressure effect from pseudotumor cerebri causing partially empty sella.  - Prolactin elevated   - ACTH low but received steroids during admission   - FT4 low normal and TSH normal, but given FT4 low normal TSH should be higher   - Discuss endocrine consult for inhouse work up vs outpatient.     # Sedation   - Weaned off sedation in ICU   - Nimbex turned off 2/27   - Prop turned off 3/9   - Precedex turned off and catapres started 3/11  - catapres 0.2 TID (decreased 3/25) - tapered down 3/31 to 0.1 tid - discontinued 4/3     # Insomnia   - Patient worked night shift x 15 years   - Trouble sleeping at night leading to day time sleepiness and poor participation with PT  - Continue on Seroquel 25 QHS (increased 3/18)   - Continue on melatonin   - Continue on Neurontin as below    # BL LE neuropathy   - CIPN concern   - Neurontin increased to 300mg Q8H with relief   - Pain mgt called to consult - recs for tylenol ATC, NEurontin increased to 400mg q6, Oxycodone 5 q4h prn , Dilaudid for BREAKTHROUGH pain only, Lidocaine patches on each leg, ice prn to feet  - Some improvement in pain - PT consult for TENS,   - oxycodone inc to 10mg as pt with no relief /buttocks area indurated /tender CT ordered -no abscess  - C/w Gabapentin : 600/600/800 and up titrate as required  - Switched to PO Dilaudid 2mg Q6H PRN moderate-severe and IV Dilaudid PRN severe breakthrough and with dressing changes   - Still with severe pain 10/10 despite change to Dilaudid will increase to 4mg q4h prn with iv Dilaudid breakthrough  - Neurontin increased to 800mg q8h Monitor neuro status/ lethargy   - I have reviewed with pt to not wait to long for dose of pain medication to  prevent pain from escalating   - Meds reviewed despite being in constant pain pt not taking Dilaudid as often as he can often with 10/10 pain Dilaudid will be every 4 hours with parameters and pt can refuse Will reconsult pain mgt again for any recommendations on Monday   - Pain management reconsulted , recs to stop Dilaudid PO ATC , continue Dilaudid for breakthrough pain with dressing changes, increase gabapentin to 900 mg Q8, increased Cymbalta to 60 mg QD and Flexeril 5mg Q8  - Neurontin inc to 900 with some decrease in pain but still severe   - Will do trial of capsaicin bid with strict instructions to avoid open /broken irritated skin/ and bandaged areas     CARDIOVASCULAR  # HTN   - HTN noted in ICU requiring cardene and esmolol GTTs   - Lisinopril dc'ed to increase catapres   - Continue on norvasc 10, catapres weaned off 4/3  - Monitor BP     # AFIB   - Hx of pAFIB   - No RVR episodes or pAFIB episodes in ICU   - No rate control medications needed  - Monitor on telemetry     # RV dilation second to PHTN   - POCUS on arrival to [facility] with RVE concern   - TTE 10/2024 with EF 55-60 with normal LVSF, moderate LVH and normal RVSF and size with no concern for pHTN   - TTE on 2/25 at  with EF 61 and normal LVSF, but enlarged RV size with reduced RVSF, mild TR, mild PHTN with PASP 49, and dilated IVC to 3.4cn, but normal TAPSE 2.1.  - Continue on aggressive diuresis in ICU    - TTE 3/11 with RVSF reduced with TAPSE 1.6. IVC improved to 2.12cm.   - Lasix 40 PO QD discontinued 4/7 secondary to ELLA  - Monitor IO/ BMP    RESPIRATORY  # ARDS second to MFPNA   - Hx of BEA on CPAP presented to  post URI with SOB and found with AHRF with progression to ARDS second to post viral MFPNA   - Intubated 2/25   - Cannulated for vvECMO 2/26   - s/p 60XLTCP tracheostomy 3/7   - Decannulated 3/7   - CTSx removed tracheostomy and ECMO sutures on 3/17   - Continue on TC QD with PMV as able with strong phonation  - Continue on nebs and HTS   -  trials continue and now trialing overnight with BIPAP for OHS (10/8/12)  - Continue on PS 18/10/40 QHS given OHS   - Decannulated 3/28   - c/w nasal cannula 2L     GI  # Dysphagia   - s/p PEG 3/7   - Attempted green dye on 3/13 and failed  - Continue on TF  - Continue on miralax and senna  - RPT green dye passed 3/19  - 3/20 Passed FEEST - on soft bite sized with thin liquids Added consistent carb 2/2 weight/Elevated A1c   - PEG removed in OR DSD over site 4/15     # Mild transaminitis   - LFTs elevated in ICU with TBILI elevated likely from ECMO hemolysis   - US ABD with hepatic steatosis   - Trend LFTs      RENAL  # ELLA   - ELLA likely from cardiorenal with RVE   - Diuresed in ICU and improved   - Monitor renal function and UOP   - Recurrent ELLA noted suspected in s/o vancomycin toxicity (4/7)   - UA negative and Urine Studies FeNa 0.9 % suggest pre-renal etiology  - Lasix discontinued for now  - s/p IV fluid trial on 4/8 with minimal improvement  - Trialing additional fluids on 4/10   - Trend BMP and I/Os    # Hypernatremia (resolved)  - Hypernatremia with fever spikes   - Fever curve improved and attempting to wean FWF   - now off FWF    INFECTIOUS DISEASE  # Recurrent fevers with fungemia from unknown source   - Recurrent fevers post ECMO decannulation thought initially to be cytokine surge   - BCx, SCx, UCx, RVP and MRSA RPT on 3/12 negative   - Completed zosyn (3/12-3/18) empirically with improved WBC , however recurrent fevers noted.   - RPT SCx, UA, CXR and RVP negative  - TTE 3/17 with no IE  - PanCT 3/17 with PNA and persistent panniculitis  - BCx 3/15 and 3/16 with candida albicans, rpt BCx 3/18 and 3/20 negative   - Concern for possible source from panniculitis   - Continue on Caspofungin 70 mg daily 3/17 - 4/15, ID consult appreciated   - 3/30 spiked temp cx's sent + ua and c/s pending Bcx neg to date - started on vanco /zosyn ID following  - Vanco stopped (4/7) due to ELLA concern for Vanco toxicty and continued on empiric Zosyn/ Caspofungin   - 4/3 - persistent fevers, leukocytosis. Will repeat CT C/A/P to evaluate for source - no source found - Bilat lung opacities mildly decreased     - 4/8 bone scan: : "1. Large area of soft tissue infection in the R side of the pelvis as detailed above. Slightly more intense activity in the R iliac bone compared to the L raises possibility of OM. 2. Intense activity in the lateral aspect of the R forefoot compatible with soft tissue infection; complicating osteomyelitis is not excluded. Milder activity in the medial aspect of the L forefoot may reflect soft tissue infection."  - ID consult appreciated. Continue on Zosyn 3.375 g 8, plan on 6 week course for possible OM until 5/11 and s/p Caspofungin 70 mg daily, which ended 4/15  - MR Pelvis Bony (4/11): No osteomyelitis. Sacral decubitus wound is again seen extending superiorly along the superficial margin of the right gluteus zurdo to the level of L5 with small amount of fluid and gas within the tract. The underlying gluteus zurdo demonstrates a region of decreased enhancement consistent with ischemia or necrosis measuring up to 11.9 cm transversely. Muscle edema suggests a nonspecific myositis. Small bilateral hip joint effusions with mild synovitis is nonspecific.  - S/P sacral wound debridement with cultures growing Providencia and Bacteroides (4/15)  - Caspofungin finished 4/15    # MFPNA and abdominal pannus cellulitis   - Presented to HH post URI with SOB and found with AHRF with progression to ARDS second to post viral MFPNA   - RVP, legionella, strept, BAL, BCx, UCx, HIV, PCP, and adenovirus PCR negative   - Initially started on multiple agents in ICU and ultimately completed zosyn (2/26-3/9) ZMAX (2/25-2/26) and vanco (2/26-3/1)     # R/o Sacral OM  - Noted with hard indurated sacral wound by Wound Care Team  - CT Pelvis (3/29) revealing superficial skin thickening overlying the sacrum with underlying edema of the subcutaneous fat, in keeping with the clinical history of sacral wound. No evidence of underlying tract or drainable fluid collection.  - CT 4/3 - no fluid collection in sacral tissue   - Given ongoing fever with no clear source with obtain MRI Pelvis per ID recs to r/o sacral OM once renal fx improves  - S/P sacral wound debridement with cultures growing Providencia and Bacteroides (4/15)    # Penile discharge   - GC/ chlamydia negative   - HIV negative   - Monitor for now    # PPX   - MRSA PCR positive and completed bactroban (2/26-3/3)     HEME   # Anemia likely second to ECMO circuit vs AOCD   - HH low in ICU second to ECMO circuit   - HH dropping again today however no bleeding noted   - Microcytic and hemochromic, sent anemia panel 3/19  - Trend HH     VASCULAR   # R IJ and BL LE DVTs   - Post ECMO dopplers on 3/9 negative for BL UE/ LE DVTs.   - Subsequent post ECMO dopplers performed on 3/14 and noted with acute, non-occlusive deep vein thrombosis noted within the right internal jugular vein. Acute, occlusive deep vein thromboses noted within the right and left peroneal veins at both mid calves, and age-indeterminate, occlusive deep vein thrombosis visualized within the left gastrocnemius vein at the proximal calf.     - Continue on argatroban GTT and changed to Eliquis   - S/P heparin gtt prior to PEG removal now transitioned back to Eliquis (4/17)    PODIATRY   # BL plantar feet blisters likely pressors induced  - Seen by podiatry and blisters lanced on 3/4 and again on 3/18  - Continue on local wound care per podiatry   - Podiatry following  - OK for WBAT will fu with PT for offloading shoe if appropriate  - Pain mgt consult   - Podiatry FU 3/28 / 4/3 done /following     ENDOCRINE  # Pre-DM2   - A1C 6.7  - Continue on ISS   - Monitor FS   - IF no demand then stop ISS     LINES/ TUBES  - R IJ and R FEM ECMO (2/26-3/7)     SKIN  # Pannus canndial intertrigo   # Sacrum/ buttock MAD  - Continue on clotrimazole cream   - WOC appreciated   -Seen by WC pt noted to have oozing from Buttock wound surgicel placed by wound care On follow up no further bleeding  - Wound care placed orders   - No bleeding from wound     ETHICS/ GOC    - FULL CODE     DISPO - PT following  Seen by PMR for acute rehab per recs

## 2025-04-18 NOTE — SWALLOW BEDSIDE ASSESSMENT ADULT - ASR SWALLOW RECOMMEND DIAG
2. Objective testing not indicated at this time, given no overt s/s airway penetration/aspiration evidenced; No recent chest imaging to suggest infection.

## 2025-04-18 NOTE — SWALLOW BEDSIDE ASSESSMENT ADULT - SPECIFY REASON(S)
Green Dye Swallow Assessment.
To clinically re-assess oropharyngeal swallow function.
to clinically re-assess swallow function/ green dye swallow assessment

## 2025-04-18 NOTE — SWALLOW BEDSIDE ASSESSMENT ADULT - COMMENTS
Progress Note-Pulm 4/18: "40 YO M with PMHx of morbid obesity, BEA on CPAP, pAFIB (not on AC), HTN, and BL papilledema attributed to pseudotumor cerebri who presented initially to [facility] on 2/24 with SOB and BL LE edema. Found with URI outpatient with progressive SOB, and concern for noted for MFPNA on imaging. Course progressed with AHRF with worsening O2 demand, respiratory distress, secretions, and somnolence requiring intubation (difficult with success after 6 attempts) in ED and admission to [facility] MICU. While in MICU, patient noted with increasing O2 demand with no improvement despite optimal vent management. Concern noted for progressive ARDS, unable to prone given morbid obesity, and ultimately cannulated for vvECMO on 2/25 and transferred to [facility] for further management on 2/26. While at [facility], tx with diuresis and ABX, and ultimately decannulated and s/p 60XLTCP trach and PEG on 3/7. Patient ultimately transferred to [facility] on 3/11 and course complicated by recurrent high grade fevers and found with fungemia; # Dysphagia  - s/p PEG 3/7  - Attempted green dye on 3/13 and failed - Continue on TF - Continue on miralax and senna - RPT green dye passed 3/19- 3/20 Passed FEEST - on soft bite sized with thin liquids Added consistent carb 2/2 weight/Elevated A1c - PEG removed in OR DSD over site 4/15"    Of Note: Patient known to this service; Seen for Green Dye Swallow Assessments 3/13 and 3/19 and FEES 3/20 with recommendations: "1.) Soft Bite Size with Thin Liquids. PATIENT MUST WEAR ONE WAY SPEAKING VALVE WITH ALL MEALS.  2.) Continue with PEG Tube Feeding for nutritional support" (see notes). Of Note: Patient known to this service; Seen for Green Dye Swallow Assessments 3/13 and 3/19 and FEES 3/20 with recommendations: "1.) Soft Bite Size with Thin Liquids. PATIENT MUST WEAR ONE WAY SPEAKING VALVE WITH ALL MEALS.  2.) Continue with PEG Tube Feeding for nutritional support" (see notes).    Patient received awake/alert, upright in bed, receiving supplemental O2 via Nasal Cannula, and in no acute distress. Patient decannulated 3/28 per EMR (trach removed with dressing in place over stoma). Patient able to produce adequate voicing & make wants/needs known. Per patient/RN at bedside, PEG recently removed. Patient reported tolerating current diet (Soft and Bite-Size with Thin Liquids) without difficulties (i.e., coughing, choking, globus/stuck sensation during/following PO intake). Agreeable to swallow re-evaluation with SLP.

## 2025-04-18 NOTE — SWALLOW BEDSIDE ASSESSMENT ADULT - SWALLOW EVAL: DIAGNOSIS
1. Functional oral stage for regular solids & thin liquids as characterized by adequate oral acceptance/containment, adequate mastication for regular solids, adequate anterior-posterior transfer, and adequate oral clearance. 2. Functional pharyngeal stage for aforementioned consistencies as characterized by suspected timely initiation of the pharyngeal swallow trigger and present hyolaryngeal excursion upon digital palpation. Patient without overt s/s airway aspiration/penetration during/following PO intake trial of regular solids & thin liquids. Patient denied globus/stuck sensation with all trialed consistencies.

## 2025-04-19 LAB
ANION GAP SERPL CALC-SCNC: 10 MMOL/L — SIGNIFICANT CHANGE UP (ref 7–14)
BUN SERPL-MCNC: 12 MG/DL — SIGNIFICANT CHANGE UP (ref 7–23)
CALCIUM SERPL-MCNC: 9.7 MG/DL — SIGNIFICANT CHANGE UP (ref 8.4–10.5)
CHLORIDE SERPL-SCNC: 99 MMOL/L — SIGNIFICANT CHANGE UP (ref 98–107)
CO2 SERPL-SCNC: 31 MMOL/L — SIGNIFICANT CHANGE UP (ref 22–31)
CREAT SERPL-MCNC: 1.43 MG/DL — HIGH (ref 0.5–1.3)
CULTURE RESULTS: SIGNIFICANT CHANGE UP
EGFR: 65 ML/MIN/1.73M2 — SIGNIFICANT CHANGE UP
EGFR: 65 ML/MIN/1.73M2 — SIGNIFICANT CHANGE UP
GLUCOSE BLDC GLUCOMTR-MCNC: 104 MG/DL — HIGH (ref 70–99)
GLUCOSE BLDC GLUCOMTR-MCNC: 115 MG/DL — HIGH (ref 70–99)
GLUCOSE BLDC GLUCOMTR-MCNC: 123 MG/DL — HIGH (ref 70–99)
GLUCOSE BLDC GLUCOMTR-MCNC: 94 MG/DL — SIGNIFICANT CHANGE UP (ref 70–99)
GLUCOSE SERPL-MCNC: 101 MG/DL — HIGH (ref 70–99)
HCT VFR BLD CALC: 27.2 % — LOW (ref 39–50)
HGB BLD-MCNC: 8.2 G/DL — LOW (ref 13–17)
MAGNESIUM SERPL-MCNC: 1.7 MG/DL — SIGNIFICANT CHANGE UP (ref 1.6–2.6)
MCHC RBC-ENTMCNC: 27 PG — SIGNIFICANT CHANGE UP (ref 27–34)
MCHC RBC-ENTMCNC: 30.1 G/DL — LOW (ref 32–36)
MCV RBC AUTO: 89.5 FL — SIGNIFICANT CHANGE UP (ref 80–100)
NRBC # BLD AUTO: 0 K/UL — SIGNIFICANT CHANGE UP (ref 0–0)
NRBC # FLD: 0 K/UL — SIGNIFICANT CHANGE UP (ref 0–0)
NRBC BLD AUTO-RTO: 0 /100 WBCS — SIGNIFICANT CHANGE UP (ref 0–0)
PHOSPHATE SERPL-MCNC: 5.1 MG/DL — HIGH (ref 2.5–4.5)
PLATELET # BLD AUTO: 398 K/UL — SIGNIFICANT CHANGE UP (ref 150–400)
POTASSIUM SERPL-MCNC: 3.7 MMOL/L — SIGNIFICANT CHANGE UP (ref 3.5–5.3)
POTASSIUM SERPL-SCNC: 3.7 MMOL/L — SIGNIFICANT CHANGE UP (ref 3.5–5.3)
RBC # BLD: 3.04 M/UL — LOW (ref 4.2–5.8)
RBC # FLD: 22.5 % — HIGH (ref 10.3–14.5)
SODIUM SERPL-SCNC: 140 MMOL/L — SIGNIFICANT CHANGE UP (ref 135–145)
SPECIMEN SOURCE: SIGNIFICANT CHANGE UP
WBC # BLD: 7.64 K/UL — SIGNIFICANT CHANGE UP (ref 3.8–10.5)
WBC # FLD AUTO: 7.64 K/UL — SIGNIFICANT CHANGE UP (ref 3.8–10.5)

## 2025-04-19 PROCEDURE — 99232 SBSQ HOSP IP/OBS MODERATE 35: CPT

## 2025-04-19 RX ORDER — HYDROMORPHONE/SOD CHLOR,ISO/PF 2 MG/10 ML
0.5 SYRINGE (ML) INJECTION ONCE
Refills: 0 | Status: DISCONTINUED | OUTPATIENT
Start: 2025-04-19 | End: 2025-04-19

## 2025-04-19 RX ADMIN — GABAPENTIN 900 MILLIGRAM(S): 400 CAPSULE ORAL at 13:32

## 2025-04-19 RX ADMIN — Medication 0.5 MILLIGRAM(S): at 06:06

## 2025-04-19 RX ADMIN — Medication 0.5 MILLIGRAM(S): at 18:46

## 2025-04-19 RX ADMIN — Medication 40 MILLIGRAM(S): at 06:07

## 2025-04-19 RX ADMIN — Medication 0.5 MILLIGRAM(S): at 09:04

## 2025-04-19 RX ADMIN — Medication 1 APPLICATION(S): at 06:08

## 2025-04-19 RX ADMIN — QUETIAPINE FUMARATE 25 MILLIGRAM(S): 25 TABLET ORAL at 22:18

## 2025-04-19 RX ADMIN — OXYCODONE HYDROCHLORIDE 10 MILLIGRAM(S): 30 TABLET ORAL at 04:29

## 2025-04-19 RX ADMIN — Medication 0.5 MILLIGRAM(S): at 00:45

## 2025-04-19 RX ADMIN — APIXABAN 5 MILLIGRAM(S): 2.5 TABLET, FILM COATED ORAL at 17:24

## 2025-04-19 RX ADMIN — Medication 1 APPLICATION(S): at 06:07

## 2025-04-19 RX ADMIN — CLOTRIMAZOLE 1 APPLICATION(S): 1 CREAM TOPICAL at 06:05

## 2025-04-19 RX ADMIN — APIXABAN 5 MILLIGRAM(S): 2.5 TABLET, FILM COATED ORAL at 06:07

## 2025-04-19 RX ADMIN — MUPIROCIN CALCIUM 1 APPLICATION(S): 20 CREAM TOPICAL at 17:25

## 2025-04-19 RX ADMIN — Medication 1 APPLICATION(S): at 11:28

## 2025-04-19 RX ADMIN — DULOXETINE 60 MILLIGRAM(S): 20 CAPSULE, DELAYED RELEASE ORAL at 11:27

## 2025-04-19 RX ADMIN — Medication 25 GRAM(S): at 06:05

## 2025-04-19 RX ADMIN — OXYCODONE HYDROCHLORIDE 10 MILLIGRAM(S): 30 TABLET ORAL at 10:37

## 2025-04-19 RX ADMIN — AMLODIPINE BESYLATE 10 MILLIGRAM(S): 10 TABLET ORAL at 06:06

## 2025-04-19 RX ADMIN — Medication 2 TABLET(S): at 22:18

## 2025-04-19 RX ADMIN — SEVELAMER HYDROCHLORIDE 800 MILLIGRAM(S): 800 TABLET ORAL at 12:34

## 2025-04-19 RX ADMIN — GABAPENTIN 900 MILLIGRAM(S): 400 CAPSULE ORAL at 22:16

## 2025-04-19 RX ADMIN — LIDOCAINE HYDROCHLORIDE 1 PATCH: 20 JELLY TOPICAL at 00:00

## 2025-04-19 RX ADMIN — Medication 1 APPLICATION(S): at 17:25

## 2025-04-19 RX ADMIN — CYCLOBENZAPRINE HYDROCHLORIDE 5 MILLIGRAM(S): 15 CAPSULE, EXTENDED RELEASE ORAL at 12:34

## 2025-04-19 RX ADMIN — Medication 1 TABLET(S): at 11:27

## 2025-04-19 RX ADMIN — SODIUM HYPOCHLORITE 1 APPLICATION(S): 0.12 SOLUTION TOPICAL at 17:25

## 2025-04-19 RX ADMIN — GABAPENTIN 900 MILLIGRAM(S): 400 CAPSULE ORAL at 06:06

## 2025-04-19 RX ADMIN — LIDOCAINE HYDROCHLORIDE 1 PATCH: 20 JELLY TOPICAL at 11:27

## 2025-04-19 RX ADMIN — Medication 25 GRAM(S): at 13:32

## 2025-04-19 RX ADMIN — MUPIROCIN CALCIUM 1 APPLICATION(S): 20 CREAM TOPICAL at 06:06

## 2025-04-19 RX ADMIN — Medication 0.5 MILLIGRAM(S): at 07:00

## 2025-04-19 RX ADMIN — Medication 25 GRAM(S): at 22:18

## 2025-04-19 RX ADMIN — LIDOCAINE HYDROCHLORIDE 1 PATCH: 20 JELLY TOPICAL at 20:46

## 2025-04-19 RX ADMIN — OXYCODONE HYDROCHLORIDE 10 MILLIGRAM(S): 30 TABLET ORAL at 05:30

## 2025-04-19 RX ADMIN — OXYCODONE HYDROCHLORIDE 10 MILLIGRAM(S): 30 TABLET ORAL at 17:24

## 2025-04-19 RX ADMIN — Medication 3 MILLIGRAM(S): at 22:17

## 2025-04-19 RX ADMIN — OXYCODONE HYDROCHLORIDE 10 MILLIGRAM(S): 30 TABLET ORAL at 23:32

## 2025-04-19 RX ADMIN — SODIUM HYPOCHLORITE 1 APPLICATION(S): 0.12 SOLUTION TOPICAL at 06:07

## 2025-04-19 RX ADMIN — Medication 1 APPLICATION(S): at 23:13

## 2025-04-19 RX ADMIN — CLOTRIMAZOLE 1 APPLICATION(S): 1 CREAM TOPICAL at 17:25

## 2025-04-19 RX ADMIN — LIDOCAINE HYDROCHLORIDE 1 PATCH: 20 JELLY TOPICAL at 19:46

## 2025-04-19 RX ADMIN — Medication 667 MILLIGRAM(S): at 17:24

## 2025-04-19 RX ADMIN — SEVELAMER HYDROCHLORIDE 800 MILLIGRAM(S): 800 TABLET ORAL at 17:24

## 2025-04-19 RX ADMIN — SEVELAMER HYDROCHLORIDE 800 MILLIGRAM(S): 800 TABLET ORAL at 07:50

## 2025-04-19 NOTE — PROGRESS NOTE ADULT - SUBJECTIVE AND OBJECTIVE BOX
CHIEF COMPLAINT: Patient is a 39y old  Male who presents with a chief complaint of sob (02 Mar 2025 06:19)      INTERVAL EVENTS: No acute events overnight.    REVIEW OF SYSTEMS: Seen and evaluated by bedside during AM rounds.        Arterial Blood Gas:  04-17 @ 12:31  7.44/52/149/35/100.0/9.4  ABG lactate: --      OBJECTIVE:  ICU Vital Signs Last 24 Hrs  T(C): 36.9 (19 Apr 2025 04:00), Max: 36.9 (19 Apr 2025 04:00)  T(F): 98.4 (19 Apr 2025 04:00), Max: 98.4 (19 Apr 2025 04:00)  HR: 105 (19 Apr 2025 04:31) (99 - 112)  BP: 143/98 (19 Apr 2025 04:00) (141/94 - 164/104)  BP(mean): 109 (19 Apr 2025 04:00) (108 - 109)  ABP: --  ABP(mean): --  RR: 14 (19 Apr 2025 04:00) (14 - 25)  SpO2: 96% (19 Apr 2025 04:31) (91% - 100%)    O2 Parameters below as of 19 Apr 2025 04:00  Patient On (Oxygen Delivery Method): AVAPS              CAPILLARY BLOOD GLUCOSE      POCT Blood Glucose.: 113 mg/dL (18 Apr 2025 22:05)      HOSPITAL MEDICATIONS:  MEDICATIONS  (STANDING):  amLODIPine   Tablet 10 milliGRAM(s) Oral daily  apixaban 5 milliGRAM(s) Oral every 12 hours  capsaicin HP 0.075% Cream 1 Application(s) Topical four times a day  chlorhexidine 2% Cloths 1 Application(s) Topical <User Schedule>  clotrimazole 1% Cream 1 Application(s) Topical two times a day  Dakins Solution - 1/4 Strength 1 Application(s) Topical two times a day  dextrose 50% Injectable 25 Gram(s) IV Push once  dextrose 50% Injectable 12.5 Gram(s) IV Push once  dextrose 50% Injectable 25 Gram(s) IV Push once  DULoxetine 60 milliGRAM(s) Oral daily  gabapentin 900 milliGRAM(s) Oral every 8 hours  glucagon  Injectable 1 milliGRAM(s) IntraMuscular once  insulin lispro (ADMELOG) corrective regimen sliding scale   SubCutaneous at bedtime  insulin lispro (ADMELOG) corrective regimen sliding scale   SubCutaneous three times a day before meals  lidocaine   4% Patch 1 Patch Transdermal daily  lidocaine   4% Patch 1 Patch Transdermal daily  melatonin 3 milliGRAM(s) Oral at bedtime  multivitamin 1 Tablet(s) Oral daily  mupirocin 2% Ointment 1 Application(s) Topical two times a day  pantoprazole    Tablet 40 milliGRAM(s) Oral before breakfast  piperacillin/tazobactam IVPB.. 3.375 Gram(s) IV Intermittent every 8 hours  polyethylene glycol 3350 17 Gram(s) Oral daily  QUEtiapine 25 milliGRAM(s) Oral at bedtime  senna 2 Tablet(s) Oral at bedtime  sevelamer carbonate 800 milliGRAM(s) Oral three times a day with meals    MEDICATIONS  (PRN):  acetaminophen     Tablet .. 650 milliGRAM(s) Oral every 6 hours PRN Temp greater or equal to 38C (100.4F), Mild Pain (1 - 3), Moderate Pain (4 - 6)  albuterol/ipratropium for Nebulization 3 milliLiter(s) Nebulizer every 6 hours PRN Shortness of Breath and/or Wheezing  cyclobenzaprine 5 milliGRAM(s) Oral three times a day PRN Muscle Spasm  dextrose Oral Gel 15 Gram(s) Oral once PRN Blood Glucose LESS THAN 70 milliGRAM(s)/deciliter  HYDROmorphone  Injectable 0.5 milliGRAM(s) IV Push every 12 hours PRN dressing changes and severe breakthrough  oxyCODONE    IR 10 milliGRAM(s) Oral every 6 hours PRN Severe Pain (7 - 10)      PHYSICAL EXAMINATION  General: Alert and cooperative. Resting comfortably. No apparent distress noted. Dry mucous membrane noted. White conjunctiva, no icterus.   HEENT: Normocephalic/atraumatic. EOMI. No discharge, conjunctivitis, or scleral icterus. No ptosis. No discharge or sinus tenderness noted. Mucous membranes are pink without bleeding. Tonsils are not enlarged. Good dental hygiene noted. No facial asymmetry. Neck is supple with a full range of motion. No masses or tenderness. Trachea is midline. Lymph nodes are not enlarged.   Cardiovascular: Normal rate and regular rhythm. No murmur/gallop.   Respiratory: Breath sounds clear and equal bilaterally without rales, wheezing, or rhonchi. No accessory muscles used.   Abdomen: Soft, nontender, nondistended. Bowel sounds are present. No bruit. No hepatosplenomegaly or masses. No rebound or guarding.   Skin: Warm to touch. No rashes, wounds, or skin lesions noted.   Extremities: +2 dorsalis pedis pulse is noted for the lower extremities. Full range of motion on all four extremities. 5/5 motor strength noted in all extremities. No clubbing, cyanosis, edema, or varicose veins.   MSK: No swelling or deformity. Posture, body symmetry, and movements are appropriate.   Neuro: AxO x 3, CN II - XII grossly intact.    LABS:                        8.2    7.64  )-----------( 398      ( 19 Apr 2025 05:40 )             27.2     04-18    141  |  100  |  13  ----------------------------<  96  3.8   |  31  |  1.42[H]    Ca    9.7      18 Apr 2025 06:40  Phos  5.2     04-18  Mg     1.70     04-18      PTT - ( 17 Apr 2025 10:38 )  PTT:31.4 sec  Urinalysis Basic - ( 18 Apr 2025 06:40 )    Color: x / Appearance: x / SG: x / pH: x  Gluc: 96 mg/dL / Ketone: x  / Bili: x / Urobili: x   Blood: x / Protein: x / Nitrite: x   Leuk Esterase: x / RBC: x / WBC x   Sq Epi: x / Non Sq Epi: x / Bacteria: x      Arterial Blood Gas:  04-17 @ 12:31  7.44/52/149/35/100.0/9.4  ABG lactate: --        PAST MEDICAL & SURGICAL HISTORY:  HTN (hypertension)      Atrial fibrillation  paroxysmal      Papilledema of both eyes      Chronic sinusitis      Morbid obesity      No significant past surgical history          FAMILY HISTORY:      Social History:  sexually active with female partner occasionally (26 Feb 2025 01:48)      RADIOLOGY:  [ ] Reviewed and interpreted by me    PULMONARY FUNCTION TESTS:    EKG: CHIEF COMPLAINT: Patient is a 39y old  Male who presents with a chief complaint of sob     INTERVAL EVENTS: No acute events overnight.    REVIEW OF SYSTEMS: Seen and evaluated by bedside during AM rounds.        Arterial Blood Gas:  04-17 @ 12:31  7.44/52/149/35/100.0/9.4  ABG lactate: --      OBJECTIVE:  ICU Vital Signs Last 24 Hrs  T(C): 36.9 (19 Apr 2025 04:00), Max: 36.9 (19 Apr 2025 04:00)  T(F): 98.4 (19 Apr 2025 04:00), Max: 98.4 (19 Apr 2025 04:00)  HR: 105 (19 Apr 2025 04:31) (99 - 112)  BP: 143/98 (19 Apr 2025 04:00) (141/94 - 164/104)  BP(mean): 109 (19 Apr 2025 04:00) (108 - 109)  ABP: --  ABP(mean): --  RR: 14 (19 Apr 2025 04:00) (14 - 25)  SpO2: 96% (19 Apr 2025 04:31) (91% - 100%)    O2 Parameters below as of 19 Apr 2025 04:00  Patient On (Oxygen Delivery Method): AVAPS              CAPILLARY BLOOD GLUCOSE      POCT Blood Glucose.: 113 mg/dL (18 Apr 2025 22:05)      HOSPITAL MEDICATIONS:  MEDICATIONS  (STANDING):  amLODIPine   Tablet 10 milliGRAM(s) Oral daily  apixaban 5 milliGRAM(s) Oral every 12 hours  capsaicin HP 0.075% Cream 1 Application(s) Topical four times a day  chlorhexidine 2% Cloths 1 Application(s) Topical <User Schedule>  clotrimazole 1% Cream 1 Application(s) Topical two times a day  Dakins Solution - 1/4 Strength 1 Application(s) Topical two times a day  dextrose 50% Injectable 25 Gram(s) IV Push once  dextrose 50% Injectable 12.5 Gram(s) IV Push once  dextrose 50% Injectable 25 Gram(s) IV Push once  DULoxetine 60 milliGRAM(s) Oral daily  gabapentin 900 milliGRAM(s) Oral every 8 hours  glucagon  Injectable 1 milliGRAM(s) IntraMuscular once  insulin lispro (ADMELOG) corrective regimen sliding scale   SubCutaneous at bedtime  insulin lispro (ADMELOG) corrective regimen sliding scale   SubCutaneous three times a day before meals  lidocaine   4% Patch 1 Patch Transdermal daily  lidocaine   4% Patch 1 Patch Transdermal daily  melatonin 3 milliGRAM(s) Oral at bedtime  multivitamin 1 Tablet(s) Oral daily  mupirocin 2% Ointment 1 Application(s) Topical two times a day  pantoprazole    Tablet 40 milliGRAM(s) Oral before breakfast  piperacillin/tazobactam IVPB.. 3.375 Gram(s) IV Intermittent every 8 hours  polyethylene glycol 3350 17 Gram(s) Oral daily  QUEtiapine 25 milliGRAM(s) Oral at bedtime  senna 2 Tablet(s) Oral at bedtime  sevelamer carbonate 800 milliGRAM(s) Oral three times a day with meals    MEDICATIONS  (PRN):  acetaminophen     Tablet .. 650 milliGRAM(s) Oral every 6 hours PRN Temp greater or equal to 38C (100.4F), Mild Pain (1 - 3), Moderate Pain (4 - 6)  albuterol/ipratropium for Nebulization 3 milliLiter(s) Nebulizer every 6 hours PRN Shortness of Breath and/or Wheezing  cyclobenzaprine 5 milliGRAM(s) Oral three times a day PRN Muscle Spasm  dextrose Oral Gel 15 Gram(s) Oral once PRN Blood Glucose LESS THAN 70 milliGRAM(s)/deciliter  HYDROmorphone  Injectable 0.5 milliGRAM(s) IV Push every 12 hours PRN dressing changes and severe breakthrough  oxyCODONE    IR 10 milliGRAM(s) Oral every 6 hours PRN Severe Pain (7 - 10)      PHYSICAL EXAMINATION  General: Alert and cooperative. Sitting in bed, resting comfortably. No apparent distress noted.   HEENT: Normocephalic/atraumatic.   Cardiovascular: Regular rhythm. + Sinus tachycardia    Respiratory: + NC. Breath sounds clear w/ diminished bases bilaterally without wheezing. No accessory muscles used.   Abdomen: + Central obesity. Soft, nontender, nondistended.   Skin: Warm to touch.   Extremities: + Full range of motion on upper extremities. Unable to move lower extremities. B/L lower extremities wrapped with gauze   Neuro: AxO x 3. Follow commands.       LABS:                        8.2    7.64  )-----------( 398      ( 19 Apr 2025 05:40 )             27.2     04-18    141  |  100  |  13  ----------------------------<  96  3.8   |  31  |  1.42[H]    Ca    9.7      18 Apr 2025 06:40  Phos  5.2     04-18  Mg     1.70     04-18      PTT - ( 17 Apr 2025 10:38 )  PTT:31.4 sec  Urinalysis Basic - ( 18 Apr 2025 06:40 )    Color: x / Appearance: x / SG: x / pH: x  Gluc: 96 mg/dL / Ketone: x  / Bili: x / Urobili: x   Blood: x / Protein: x / Nitrite: x   Leuk Esterase: x / RBC: x / WBC x   Sq Epi: x / Non Sq Epi: x / Bacteria: x      Arterial Blood Gas:  04-17 @ 12:31  7.44/52/149/35/100.0/9.4  ABG lactate: --        PAST MEDICAL & SURGICAL HISTORY:  HTN (hypertension)      Atrial fibrillation  paroxysmal      Papilledema of both eyes      Chronic sinusitis      Morbid obesity      No significant past surgical history          FAMILY HISTORY:      Social History:  sexually active with female partner occasionally (26 Feb 2025 01:48)      RADIOLOGY:  [ ] Reviewed and interpreted by me    PULMONARY FUNCTION TESTS:    EKG: CHIEF COMPLAINT: Patient is a 39y old  Male who presents with a chief complaint of sob     INTERVAL EVENTS: No acute events overnight.    REVIEW OF SYSTEMS: Seen and evaluated by bedside during AM rounds. Pt endorses constant B/L LE pain.       Arterial Blood Gas:  04-17 @ 12:31  7.44/52/149/35/100.0/9.4  ABG lactate: --      OBJECTIVE:  ICU Vital Signs Last 24 Hrs  T(C): 36.9 (19 Apr 2025 04:00), Max: 36.9 (19 Apr 2025 04:00)  T(F): 98.4 (19 Apr 2025 04:00), Max: 98.4 (19 Apr 2025 04:00)  HR: 105 (19 Apr 2025 04:31) (99 - 112)  BP: 143/98 (19 Apr 2025 04:00) (141/94 - 164/104)  BP(mean): 109 (19 Apr 2025 04:00) (108 - 109)  ABP: --  ABP(mean): --  RR: 14 (19 Apr 2025 04:00) (14 - 25)  SpO2: 96% (19 Apr 2025 04:31) (91% - 100%)    O2 Parameters below as of 19 Apr 2025 04:00  Patient On (Oxygen Delivery Method): AVAPS              CAPILLARY BLOOD GLUCOSE      POCT Blood Glucose.: 113 mg/dL (18 Apr 2025 22:05)      HOSPITAL MEDICATIONS:  MEDICATIONS  (STANDING):  amLODIPine   Tablet 10 milliGRAM(s) Oral daily  apixaban 5 milliGRAM(s) Oral every 12 hours  capsaicin HP 0.075% Cream 1 Application(s) Topical four times a day  chlorhexidine 2% Cloths 1 Application(s) Topical <User Schedule>  clotrimazole 1% Cream 1 Application(s) Topical two times a day  Dakins Solution - 1/4 Strength 1 Application(s) Topical two times a day  dextrose 50% Injectable 25 Gram(s) IV Push once  dextrose 50% Injectable 12.5 Gram(s) IV Push once  dextrose 50% Injectable 25 Gram(s) IV Push once  DULoxetine 60 milliGRAM(s) Oral daily  gabapentin 900 milliGRAM(s) Oral every 8 hours  glucagon  Injectable 1 milliGRAM(s) IntraMuscular once  insulin lispro (ADMELOG) corrective regimen sliding scale   SubCutaneous at bedtime  insulin lispro (ADMELOG) corrective regimen sliding scale   SubCutaneous three times a day before meals  lidocaine   4% Patch 1 Patch Transdermal daily  lidocaine   4% Patch 1 Patch Transdermal daily  melatonin 3 milliGRAM(s) Oral at bedtime  multivitamin 1 Tablet(s) Oral daily  mupirocin 2% Ointment 1 Application(s) Topical two times a day  pantoprazole    Tablet 40 milliGRAM(s) Oral before breakfast  piperacillin/tazobactam IVPB.. 3.375 Gram(s) IV Intermittent every 8 hours  polyethylene glycol 3350 17 Gram(s) Oral daily  QUEtiapine 25 milliGRAM(s) Oral at bedtime  senna 2 Tablet(s) Oral at bedtime  sevelamer carbonate 800 milliGRAM(s) Oral three times a day with meals    MEDICATIONS  (PRN):  acetaminophen     Tablet .. 650 milliGRAM(s) Oral every 6 hours PRN Temp greater or equal to 38C (100.4F), Mild Pain (1 - 3), Moderate Pain (4 - 6)  albuterol/ipratropium for Nebulization 3 milliLiter(s) Nebulizer every 6 hours PRN Shortness of Breath and/or Wheezing  cyclobenzaprine 5 milliGRAM(s) Oral three times a day PRN Muscle Spasm  dextrose Oral Gel 15 Gram(s) Oral once PRN Blood Glucose LESS THAN 70 milliGRAM(s)/deciliter  HYDROmorphone  Injectable 0.5 milliGRAM(s) IV Push every 12 hours PRN dressing changes and severe breakthrough  oxyCODONE    IR 10 milliGRAM(s) Oral every 6 hours PRN Severe Pain (7 - 10)      PHYSICAL EXAMINATION  General: Alert and cooperative. Sitting in recliner. No apparent distress noted.   HEENT: Normocephalic/atraumatic.   Cardiovascular: Regular rhythm. + Sinus tachycardia    Respiratory: + NC. Breath sounds clear w/ diminished bases bilaterally without wheezing. No accessory muscles used.   Abdomen: + Central obesity. Soft, nontender, nondistended.    Skin: Warm to touch.   Extremities: + Full range of motion on upper extremities. Unable to move lower extremities. B/L lower extremities wrapped with gauze   Neuro: AxO x 3. Follow commands.       LABS:                        8.2    7.64  )-----------( 398      ( 19 Apr 2025 05:40 )             27.2     04-18    141  |  100  |  13  ----------------------------<  96  3.8   |  31  |  1.42[H]    Ca    9.7      18 Apr 2025 06:40  Phos  5.2     04-18  Mg     1.70     04-18      PTT - ( 17 Apr 2025 10:38 )  PTT:31.4 sec  Urinalysis Basic - ( 18 Apr 2025 06:40 )    Color: x / Appearance: x / SG: x / pH: x  Gluc: 96 mg/dL / Ketone: x  / Bili: x / Urobili: x   Blood: x / Protein: x / Nitrite: x   Leuk Esterase: x / RBC: x / WBC x   Sq Epi: x / Non Sq Epi: x / Bacteria: x      Arterial Blood Gas:  04-17 @ 12:31  7.44/52/149/35/100.0/9.4  ABG lactate: --        PAST MEDICAL & SURGICAL HISTORY:  HTN (hypertension)      Atrial fibrillation  paroxysmal      Papilledema of both eyes      Chronic sinusitis      Morbid obesity      No significant past surgical history          FAMILY HISTORY:      Social History:  sexually active with female partner occasionally (26 Feb 2025 01:48)      RADIOLOGY:  [ ] Reviewed and interpreted by me    PULMONARY FUNCTION TESTS:    EKG:

## 2025-04-19 NOTE — PROGRESS NOTE ADULT - NS ATTEND AMEND GEN_ALL_CORE FT
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     #Acute hypoxemic and hypercapnic respiratory failure s/p tracheostomy placement   - Decannulated 3/28, continues to use Bilevel NIV qhs    #RV failure 2/2 pulmHTN   - Maintaining euvolemia     #AFib - rate controlled     #DVT right IJ and bilateral peroneal veins - AC for DVT and AFib, held for surgery, - no significant hemorrhage, transition to Eliquis    #Transaminitis improving  -Hepatomegaly will need to follow as outpatient    #Fungemia  #Sacral ulceration with induration  - NM WBC scan performed  concerning for active infection with possible osteomyelitis of right pelvis/iliac bone. MRI unrevealing but given high clinical suspicion treating as OM. Pt will need long-term Abx, on Zosyn per ID until 5/11. Pt on caspofungin 4/15. Surgery consulted  - patient sp OR wound debridement on 4/15    #Pain  - Neuropathic pain  Evaluated by chronic pain service 4/8 as patient continues to have uncontrolled pain in the LE - Gabapentin now 900 q8h, Flexeril and increased to Cymbalta 60 mg po daily.  oxycodone 10 q6h prn, breakthrough Dilaudid 0.5 mg IV q12h for dressing changes. Will attempt to transition to po regimen   - also sp debridement of sacral wound   - pain service evaluated. as above:  38 yo Male with obesity (Class III, BMI 69), pAfib (no AC), HTN, BEA (2019 AHI 34 non-compliant on CPAP [id number]), and history of b/l papilledema attributed to pseudotumor cerebri initially presenting as a transfer [facility] on 2/26 for acute hypoxemic respiratory failure s/p ETT intubation/MV with progression to refractory ARDS s/p initiation of vv-ECMO support (2/26-3/7) with failure to wean from ventilator s/p tracheostomy placement, further found to have RV failure s/p aggressive diuresis and PNA followed by severe sepsis 2/2 panniculitis c/b candida albicans fungemia now transferred to [facility] for further medical management.     #Acute hypoxemic and hypercapnic respiratory failure s/p tracheostomy placement   - Decannulated 3/28, continues to use Bilevel NIV qhs    #RV failure 2/2 pulmHTN   - Maintaining euvolemia     #AFib - rate controlled     #DVT right IJ and bilateral peroneal veins - AC for DVT and AFib, held for surgery, - no significant hemorrhage, transition to Eliquis    #Transaminitis improving  -Hepatomegaly will need to follow as outpatient    #Fungemia  #Sacral ulceration with induration  - NM WBC scan performed  concerning for active infection with possible osteomyelitis of right pelvis/iliac bone. MRI unrevealing but given high clinical suspicion treating as OM. Pt will need long-term Abx, on Zosyn per ID until 5/11. Pt on caspofungin 4/15. Surgery consulted  - patient sp OR wound debridement on 4/15    #Pain  - Neuropathic pain  Evaluated by chronic pain service 4/8 as patient continues to have uncontrolled pain in the LE - Gabapentin now 900 q8h, Flexeril and increased to Cymbalta 60 mg po daily.  oxycodone 10 q6h prn, breakthrough Dilaudid 0.5 mg IV q12h for dressing changes. Will attempt to transition to po regimen   - also sp debridement of sacral wound   - pain service evaluated.    Roderick Ko MD-Pulmonary   [phone]

## 2025-04-19 NOTE — PROGRESS NOTE ADULT - ASSESSMENT
38 YO M with PMHx of morbid obesity, BEA on CPAP, pAFIB (not on AC), HTN, and BL papilledema attributed to pseudotumor cerebri who presented initially to [facility] on 2/24 with SOB and BL LE edema. Found with URI outpatient with progressive SOB, and concern for noted for MFPNA on imaging. Course progressed with AHRF with worsening O2 demand, respiratory distress, secretions, and somnolence requiring intubation (difficult with success after 6 attempts) in ED and admission to [facility] MICU. While in MICU, patient noted with increasing O2 demand with no improvement despite optimal vent management. Concern noted for progressive ARDS, unable to prone given morbid obesity, and ultimately cannulated for vvECMO on 2/25 and transferred to [facility] for further management on 2/26. While at [facility], tx with diuresis and ABX, and ultimately decannulated and s/p 60XLTCP trach and PEG on 3/7. Patient ultimately transferred to [facility] on 3/11 and course complicated by recurrent high grade fevers and found with fungemia.    NEUROLOGY  # Hx of Pseudotumor Cerebri   - s/p LP in 2024 with high opening pressure  - s/p MRI 2024 with possible pituitary mass but unclear because of pressure effect from pseudotumor cerebri causing partially empty sella.  - Prolactin elevated   - ACTH low but received steroids during admission   - FT4 low normal and TSH normal, but given FT4 low normal TSH should be higher   - Discuss endocrine consult for inhouse work up vs outpatient.     # Sedation   - Weaned off sedation in ICU   - Nimbex turned off 2/27   - Prop turned off 3/9   - Precedex turned off and catapres started 3/11  - catapres 0.2 TID (decreased 3/25) - tapered down 3/31 to 0.1 tid - discontinued 4/3     # Insomnia   - Patient worked night shift x 15 years   - Trouble sleeping at night leading to day time sleepiness and poor participation with PT  - Continue on Seroquel 25 QHS (increased 3/18)   - Continue on melatonin   - Continue on Neurontin as below    # BL LE neuropathy   - CIPN concern   - Neurontin increased to 300mg Q8H with relief   - Pain mgt called to consult - recs for tylenol ATC, NEurontin increased to 400mg q6, Oxycodone 5 q4h prn , Dilaudid for BREAKTHROUGH pain only, Lidocaine patches on each leg, ice prn to feet  - Some improvement in pain - PT consult for TENS,   - oxycodone inc to 10mg as pt with no relief /buttocks area indurated /tender CT ordered -no abscess  - C/w Gabapentin : 600/600/800 and up titrate as required  - Switched to PO Dilaudid 2mg Q6H PRN moderate-severe and IV Dilaudid PRN severe breakthrough and with dressing changes   - Still with severe pain 10/10 despite change to Dilaudid will increase to 4mg q4h prn with iv Dilaudid breakthrough  - Neurontin increased to 800mg q8h Monitor neuro status/ lethargy   - I have reviewed with pt to not wait to long for dose of pain medication to  prevent pain from escalating   - Meds reviewed despite being in constant pain pt not taking Dilaudid as often as he can often with 10/10 pain Dilaudid will be every 4 hours with parameters and pt can refuse Will reconsult pain mgt again for any recommendations on Monday   - Pain management reconsulted , recs to stop Dilaudid PO ATC , continue Dilaudid for breakthrough pain with dressing changes, increase gabapentin to 900 mg Q8, increased Cymbalta to 60 mg QD and Flexeril 5mg Q8  - Neurontin inc to 900 with some decrease in pain but still severe   - Will do trial of capsaicin bid with strict instructions to avoid open /broken irritated skin/ and bandaged areas     CARDIOVASCULAR  # HTN   - HTN noted in ICU requiring cardene and esmolol GTTs   - Lisinopril dc'ed to increase catapres   - Continue on norvasc 10, catapres weaned off 4/3  - Monitor BP     # AFIB   - Hx of pAFIB   - No RVR episodes or pAFIB episodes in ICU   - No rate control medications needed  - Monitor on telemetry     # RV dilation second to PHTN   - POCUS on arrival to [facility] with RVE concern   - TTE 10/2024 with EF 55-60 with normal LVSF, moderate LVH and normal RVSF and size with no concern for pHTN   - TTE on 2/25 at  with EF 61 and normal LVSF, but enlarged RV size with reduced RVSF, mild TR, mild PHTN with PASP 49, and dilated IVC to 3.4cn, but normal TAPSE 2.1.  - Continue on aggressive diuresis in ICU    - TTE 3/11 with RVSF reduced with TAPSE 1.6. IVC improved to 2.12cm.   - Lasix 40 PO QD discontinued 4/7 secondary to ELLA  - Monitor IO/ BMP    RESPIRATORY  # ARDS second to MFPNA   - Hx of BEA on CPAP presented to  post URI with SOB and found with AHRF with progression to ARDS second to post viral MFPNA   - Intubated 2/25   - Cannulated for vvECMO 2/26   - s/p 60XLTCP tracheostomy 3/7   - Decannulated 3/7   - CTSx removed tracheostomy and ECMO sutures on 3/17   - Continue on TC QD with PMV as able with strong phonation  - Continue on nebs and HTS   -  trials continue and now trialing overnight with BIPAP for OHS (10/8/12)  - Continue on PS 18/10/40 QHS given OHS   - Decannulated 3/28   - c/w nasal cannula 2L     GI  # Dysphagia   - s/p PEG 3/7   - Attempted green dye on 3/13 and failed  - Continue on TF  - Continue on miralax and senna  - RPT green dye passed 3/19  - 3/20 Passed FEEST - on soft bite sized with thin liquids Added consistent carb 2/2 weight/Elevated A1c   - PEG removed in OR DSD over site 4/15     # Mild transaminitis   - LFTs elevated in ICU with TBILI elevated likely from ECMO hemolysis   - US ABD with hepatic steatosis   - Trend LFTs      RENAL  # ELLA   - ELLA likely from cardiorenal with RVE   - Diuresed in ICU and improved   - Monitor renal function and UOP   - Recurrent ELLA noted suspected in s/o vancomycin toxicity (4/7)   - UA negative and Urine Studies FeNa 0.9 % suggest pre-renal etiology  - Lasix discontinued for now  - s/p IV fluid trial on 4/8 with minimal improvement  - Trialing additional fluids on 4/10   - Trend BMP and I/Os    # Hypernatremia (resolved)  - Hypernatremia with fever spikes   - Fever curve improved and attempting to wean FWF   - now off FWF    INFECTIOUS DISEASE  # Recurrent fevers with fungemia from unknown source   - Recurrent fevers post ECMO decannulation thought initially to be cytokine surge   - BCx, SCx, UCx, RVP and MRSA RPT on 3/12 negative   - Completed zosyn (3/12-3/18) empirically with improved WBC , however recurrent fevers noted.   - RPT SCx, UA, CXR and RVP negative  - TTE 3/17 with no IE  - PanCT 3/17 with PNA and persistent panniculitis  - BCx 3/15 and 3/16 with candida albicans, rpt BCx 3/18 and 3/20 negative   - Concern for possible source from panniculitis   - Continue on Caspofungin 70 mg daily 3/17 - 4/15, ID consult appreciated   - 3/30 spiked temp cx's sent + ua and c/s pending Bcx neg to date - started on vanco /zosyn ID following  - Vanco stopped (4/7) due to ELLA concern for Vanco toxicty and continued on empiric Zosyn/ Caspofungin   - 4/3 - persistent fevers, leukocytosis. Will repeat CT C/A/P to evaluate for source - no source found - Bilat lung opacities mildly decreased     - 4/8 bone scan: : "1. Large area of soft tissue infection in the R side of the pelvis as detailed above. Slightly more intense activity in the R iliac bone compared to the L raises possibility of OM. 2. Intense activity in the lateral aspect of the R forefoot compatible with soft tissue infection; complicating osteomyelitis is not excluded. Milder activity in the medial aspect of the L forefoot may reflect soft tissue infection."  - ID consult appreciated. Continue on Zosyn 3.375 g 8, plan on 6 week course for possible OM until 5/11 and s/p Caspofungin 70 mg daily, which ended 4/15  - MR Pelvis Bony (4/11): No osteomyelitis. Sacral decubitus wound is again seen extending superiorly along the superficial margin of the right gluteus zurdo to the level of L5 with small amount of fluid and gas within the tract. The underlying gluteus zurdo demonstrates a region of decreased enhancement consistent with ischemia or necrosis measuring up to 11.9 cm transversely. Muscle edema suggests a nonspecific myositis. Small bilateral hip joint effusions with mild synovitis is nonspecific.  - S/P sacral wound debridement with cultures growing Providencia and Bacteroides (4/15)  - Caspofungin finished 4/15    # MFPNA and abdominal pannus cellulitis   - Presented to HH post URI with SOB and found with AHRF with progression to ARDS second to post viral MFPNA   - RVP, legionella, strept, BAL, BCx, UCx, HIV, PCP, and adenovirus PCR negative   - Initially started on multiple agents in ICU and ultimately completed zosyn (2/26-3/9) ZMAX (2/25-2/26) and vanco (2/26-3/1)     # R/o Sacral OM  - Noted with hard indurated sacral wound by Wound Care Team  - CT Pelvis (3/29) revealing superficial skin thickening overlying the sacrum with underlying edema of the subcutaneous fat, in keeping with the clinical history of sacral wound. No evidence of underlying tract or drainable fluid collection.  - CT 4/3 - no fluid collection in sacral tissue   - Given ongoing fever with no clear source with obtain MRI Pelvis per ID recs to r/o sacral OM once renal fx improves  - S/P sacral wound debridement with cultures growing Providencia and Bacteroides (4/15)    # Penile discharge   - GC/ chlamydia negative   - HIV negative   - Monitor for now    # PPX   - MRSA PCR positive and completed bactroban (2/26-3/3)     HEME   # Anemia likely second to ECMO circuit vs AOCD   - HH low in ICU second to ECMO circuit   - HH dropping again today however no bleeding noted   - Microcytic and hemochromic, sent anemia panel 3/19  - Trend HH     VASCULAR   # R IJ and BL LE DVTs   - Post ECMO dopplers on 3/9 negative for BL UE/ LE DVTs.   - Subsequent post ECMO dopplers performed on 3/14 and noted with acute, non-occlusive deep vein thrombosis noted within the right internal jugular vein. Acute, occlusive deep vein thromboses noted within the right and left peroneal veins at both mid calves, and age-indeterminate, occlusive deep vein thrombosis visualized within the left gastrocnemius vein at the proximal calf.     - Continue on argatroban GTT and changed to Eliquis   - S/P heparin gtt prior to PEG removal now transitioned back to Eliquis (4/17)    PODIATRY   # BL plantar feet blisters likely pressors induced  - Seen by podiatry and blisters lanced on 3/4 and again on 3/18  - Continue on local wound care per podiatry   - Podiatry following  - OK for WBAT will fu with PT for offloading shoe if appropriate  - Pain mgt consult   - Podiatry FU 3/28 / 4/3 done /following     ENDOCRINE  # Pre-DM2   - A1C 6.7  - Continue on ISS   - Monitor FS   - IF no demand then stop ISS     LINES/ TUBES  - R IJ and R FEM ECMO (2/26-3/7)     SKIN  # Pannus canndial intertrigo   # Sacrum/ buttock MAD  - Continue on clotrimazole cream   - WOC appreciated   -Seen by WC pt noted to have oozing from Buttock wound surgicel placed by wound care On follow up no further bleeding  - Wound care placed orders   - No bleeding from wound     ETHICS/ GOC    - FULL CODE     DISPO - PT following  Seen by PMR for acute rehab per recs   38 YO M with PMHx of morbid obesity, BEA on CPAP, pAFIB (not on AC), HTN, and BL papilledema attributed to pseudotumor cerebri who presented initially to [facility] on 2/24 with SOB and BL LE edema. Found with URI outpatient with progressive SOB, and concern for noted for MFPNA on imaging. Course progressed with AHRF with worsening O2 demand, respiratory distress, secretions, and somnolence requiring intubation (difficult with success after 6 attempts) in ED and admission to [facility] MICU. While in MICU, patient noted with increasing O2 demand with no improvement despite optimal vent management. Concern noted for progressive ARDS, unable to prone given morbid obesity, and ultimately cannulated for vvECMO on 2/25 and transferred to [facility] for further management on 2/26. While at [facility], tx with diuresis and ABX, and ultimately decannulated and s/p 60XLTCP trach and PEG on 3/7. Patient ultimately transferred to [facility] on 3/11 and course complicated by recurrent high grade fevers and found with fungemia.    NEUROLOGY  # Hx of Pseudotumor Cerebri   - s/p LP in 2024 with high opening pressure  - s/p MRI 2024 with possible pituitary mass but unclear because of pressure effect from pseudotumor cerebri causing partially empty sella.  - Prolactin elevated   - ACTH low but received steroids during admission   - FT4 low normal and TSH normal, but given FT4 low normal TSH should be higher   - Discuss endocrine consult for inhouse work up vs outpatient.     # Sedation   - Weaned off sedation in ICU   - Nimbex turned off 2/27   - Prop turned off 3/9   - Precedex turned off and catapres started 3/11  - catapres 0.2 TID (decreased 3/25) - tapered down 3/31 to 0.1 tid - discontinued 4/3     # Insomnia   - Patient worked night shift x 15 years   - Trouble sleeping at night leading to day time sleepiness and poor participation with PT  - Continue on Seroquel 25 QHS (increased 3/18)   - Continue on melatonin   - Continue on Neurontin as below    # BL LE neuropathy   - CIPN concern   - Neurontin increased to 300mg Q8H with relief   - Pain mgt called to consult - recs for tylenol ATC, NEurontin increased to 400mg q6, Oxycodone 5 q4h prn , Dilaudid for BREAKTHROUGH pain only, Lidocaine patches on each leg, ice prn to feet  - Some improvement in pain - PT consult for TENS,   - oxycodone inc to 10mg as pt with no relief /buttocks area indurated /tender CT ordered -no abscess  - C/w Gabapentin : 600/600/800 and up titrate as required  - Switched to PO Dilaudid 2mg Q6H PRN moderate-severe and IV Dilaudid PRN severe breakthrough and with dressing changes   - Still with severe pain 10/10 despite change to Dilaudid will increase to 4mg q4h prn with iv Dilaudid breakthrough  - Neurontin increased to 800mg q8h Monitor neuro status/ lethargy   - I have reviewed with pt to not wait to long for dose of pain medication to  prevent pain from escalating   - Meds reviewed despite being in constant pain pt not taking Dilaudid as often as he can often with 10/10 pain Dilaudid will be every 4 hours with parameters and pt can refuse Will reconsult pain mgt again for any recommendations on Monday   - Pain management reconsulted , recs to stop Dilaudid PO ATC , continue Dilaudid for breakthrough pain with dressing changes, increase gabapentin to 900 mg Q8, increased Cymbalta to 60 mg QD and Flexeril 5mg Q8  - Neurontin inc to 900 with some decrease in pain but still severe   - Will do trial of capsaicin bid with strict instructions to avoid open /broken irritated skin/ and bandaged areas     CARDIOVASCULAR  # HTN   - HTN noted in ICU requiring cardene and esmolol GTTs   - Lisinopril dc'ed to increase catapres   - Continue on norvasc 10, catapres weaned off 4/3  - Monitor BP     # AFIB   - Hx of pAFIB   - No RVR episodes or pAFIB episodes in ICU   - No rate control medications needed  - Monitor on telemetry     # RV dilation second to PHTN   - POCUS on arrival to [facility] with RVE concern   - TTE 10/2024 with EF 55-60 with normal LVSF, moderate LVH and normal RVSF and size with no concern for pHTN   - TTE on 2/25 at  with EF 61 and normal LVSF, but enlarged RV size with reduced RVSF, mild TR, mild PHTN with PASP 49, and dilated IVC to 3.4cn, but normal TAPSE 2.1.  - Continue on aggressive diuresis in ICU    - TTE 3/11 with RVSF reduced with TAPSE 1.6. IVC improved to 2.12cm.   - Lasix 40 PO QD discontinued 4/7 secondary to ELLA  - Monitor IO/ BMP    RESPIRATORY  # ARDS second to MFPNA   - Hx of BEA on CPAP presented to  post URI with SOB and found with AHRF with progression to ARDS second to post viral MFPNA   - Intubated 2/25   - Cannulated for vvECMO 2/26   - s/p 60XLTCP tracheostomy 3/7   - Decannulated 3/7   - CTSx removed tracheostomy and ECMO sutures on 3/17   - Continue on TC QD with PMV as able with strong phonation  - Continue on nebs and HTS   -  trials continue and now trialing overnight with BIPAP for OHS (10/8/12)  - Continue on PS 18/10/40 QHS given OHS   - Decannulated 3/28   - c/w nasal cannula 2L     GI  # Dysphagia   - s/p PEG 3/7   - Attempted green dye on 3/13 and failed  - Continue on TF  - Continue on miralax and senna  - RPT green dye passed 3/19  - 3/20 Passed FEEST - on soft bite sized with thin liquids Added consistent carb 2/2 weight/Elevated A1c   - PEG removed in OR DSD over site 4/15     # Mild transaminitis   - LFTs elevated in ICU with TBILI elevated likely from ECMO hemolysis   - US ABD with hepatic steatosis   - Trend LFTs      RENAL  # ELLA   - ELLA likely from cardiorenal with RVE   - Diuresed in ICU and improved   - Monitor renal function and UOP   - Recurrent ELLA noted suspected in s/o vancomycin toxicity (4/7)   - UA negative and Urine Studies FeNa 0.9 % suggest pre-renal etiology  - Lasix discontinued for now  - s/p IV fluid trial on 4/8 with minimal improvement  - Trialing additional fluids on 4/10   - Trend BMP and I/Os    # Hypernatremia (resolved)  - Hypernatremia with fever spikes   - Fever curve improved and attempting to wean FWF   - now off FWF    INFECTIOUS DISEASE  # Recurrent fevers with fungemia from unknown source   - Recurrent fevers post ECMO decannulation thought initially to be cytokine surge   - BCx, SCx, UCx, RVP and MRSA RPT on 3/12 negative   - Completed zosyn (3/12-3/18) empirically with improved WBC , however recurrent fevers noted.   - RPT SCx, UA, CXR and RVP negative  - TTE 3/17 with no IE  - PanCT 3/17 with PNA and persistent panniculitis  - BCx 3/15 and 3/16 with candida albicans, rpt BCx 3/18 and 3/20 negative   - Concern for possible source from panniculitis   - Continue on Caspofungin 70 mg daily 3/17 - 4/15, ID consult appreciated   - 3/30 spiked temp cx's sent + ua and c/s pending Bcx neg to date - started on vanco /zosyn ID following  - Vanco stopped (4/7) due to ELLA concern for Vanco toxicty and continued on empiric Zosyn/ Caspofungin   - 4/3 - persistent fevers, leukocytosis. Will repeat CT C/A/P to evaluate for source - no source found - Bilat lung opacities mildly decreased     - 4/8 bone scan: : "1. Large area of soft tissue infection in the R side of the pelvis as detailed above. Slightly more intense activity in the R iliac bone compared to the L raises possibility of OM. 2. Intense activity in the lateral aspect of the R forefoot compatible with soft tissue infection; complicating osteomyelitis is not excluded. Milder activity in the medial aspect of the L forefoot may reflect soft tissue infection."  - ID consult appreciated. Continue on Zosyn 3.375 g 8, plan on 6 week course for possible OM until 5/11 and s/p Caspofungin 70 mg daily, which ended 4/15  - MR Pelvis Bony (4/11): No osteomyelitis. Sacral decubitus wound is again seen extending superiorly along the superficial margin of the right gluteus zurdo to the level of L5 with small amount of fluid and gas within the tract. The underlying gluteus zurdo demonstrates a region of decreased enhancement consistent with ischemia or necrosis measuring up to 11.9 cm transversely. Muscle edema suggests a nonspecific myositis. Small bilateral hip joint effusions with mild synovitis is nonspecific.  - S/P sacral wound debridement with cultures growing Providencia and Bacteroides (4/15)  - Caspofungin finished 4/15    # MFPNA and abdominal pannus cellulitis   - Presented to HH post URI with SOB and found with AHRF with progression to ARDS second to post viral MFPNA   - RVP, legionella, strept, BAL, BCx, UCx, HIV, PCP, and adenovirus PCR negative   - Initially started on multiple agents in ICU and ultimately completed zosyn (2/26-3/9) ZMAX (2/25-2/26) and vanco (2/26-3/1)     # R/o Sacral OM  - Noted with hard indurated sacral wound by Wound Care Team  - CT Pelvis (3/29) revealing superficial skin thickening overlying the sacrum with underlying edema of the subcutaneous fat, in keeping with the clinical history of sacral wound. No evidence of underlying tract or drainable fluid collection.  - CT 4/3 - no fluid collection in sacral tissue   - Given ongoing fever with no clear source with obtain MRI Pelvis per ID recs to r/o sacral OM once renal fx improves  - S/P sacral wound debridement with cultures growing Providencia and Bacteroides (4/15)    # Penile discharge   - GC/ chlamydia negative   - HIV negative   - Monitor for now    # PPX   - MRSA PCR positive and completed bactroban (2/26-3/3)     HEME   # Anemia likely second to ECMO circuit vs AOCD   - HH low in ICU second to ECMO circuit   - HH dropping again today however no bleeding noted   - Microcytic and hemochromic, sent anemia panel 3/19  - Trend HH     VASCULAR   # R IJ and BL LE DVTs   - Post ECMO dopplers on 3/9 negative for BL UE/ LE DVTs.   - Subsequent post ECMO dopplers performed on 3/14 and noted with acute, non-occlusive deep vein thrombosis noted within the right internal jugular vein. Acute, occlusive deep vein thromboses noted within the right and left peroneal veins at both mid calves, and age-indeterminate, occlusive deep vein thrombosis visualized within the left gastrocnemius vein at the proximal calf.     - Continue on argatroban GTT and changed to Eliquis   - S/P heparin gtt prior to PEG removal now transitioned back to Eliquis (4/17)    PODIATRY   # BL plantar feet blisters likely pressors induced  - Seen by podiatry and blisters lanced on 3/4 and again on 3/18  - Continue on local wound care per podiatry   - Podiatry following  - OK for WBAT will fu with PT for offloading shoe if appropriate  - Pain mgt consult   - Podiatry FU 3/28 / 4/3 done /following     ENDOCRINE  # Pre-DM2   - A1C 6.7  - Continue on ISS   - Monitor FS   - IF no demand then stop ISS     LINES/ TUBES  - R IJ and R FEM ECMO (2/26-3/7)     SKIN  # Pannus canndial intertrigo   # Sacrum/ buttock MAD  - Continue on clotrimazole cream   - WOC appreciated   -Seen by WC pt noted to have oozing from Buttock wound surgicel placed by wound care On follow up no further bleeding  - Wound care placed orders   - No bleeding from wound     ETHICS/ GOC    - FULL CODE     DISPO - PT following  Seen by PMR for acute rehab per recs  ***************  4/19-no new events

## 2025-04-20 LAB
ANION GAP SERPL CALC-SCNC: 11 MMOL/L — SIGNIFICANT CHANGE UP (ref 7–14)
BUN SERPL-MCNC: 13 MG/DL — SIGNIFICANT CHANGE UP (ref 7–23)
CALCIUM SERPL-MCNC: 10 MG/DL — SIGNIFICANT CHANGE UP (ref 8.4–10.5)
CHLORIDE SERPL-SCNC: 98 MMOL/L — SIGNIFICANT CHANGE UP (ref 98–107)
CO2 SERPL-SCNC: 30 MMOL/L — SIGNIFICANT CHANGE UP (ref 22–31)
CREAT SERPL-MCNC: 1.43 MG/DL — HIGH (ref 0.5–1.3)
EGFR: 65 ML/MIN/1.73M2 — SIGNIFICANT CHANGE UP
EGFR: 65 ML/MIN/1.73M2 — SIGNIFICANT CHANGE UP
GLUCOSE BLDC GLUCOMTR-MCNC: 103 MG/DL — HIGH (ref 70–99)
GLUCOSE BLDC GLUCOMTR-MCNC: 104 MG/DL — HIGH (ref 70–99)
GLUCOSE BLDC GLUCOMTR-MCNC: 110 MG/DL — HIGH (ref 70–99)
GLUCOSE BLDC GLUCOMTR-MCNC: 94 MG/DL — SIGNIFICANT CHANGE UP (ref 70–99)
GLUCOSE SERPL-MCNC: 94 MG/DL — SIGNIFICANT CHANGE UP (ref 70–99)
HCT VFR BLD CALC: 28.2 % — LOW (ref 39–50)
HGB BLD-MCNC: 8.3 G/DL — LOW (ref 13–17)
MAGNESIUM SERPL-MCNC: 1.7 MG/DL — SIGNIFICANT CHANGE UP (ref 1.6–2.6)
MCHC RBC-ENTMCNC: 25.8 PG — LOW (ref 27–34)
MCHC RBC-ENTMCNC: 29.4 G/DL — LOW (ref 32–36)
MCV RBC AUTO: 87.6 FL — SIGNIFICANT CHANGE UP (ref 80–100)
NRBC # BLD AUTO: 0 K/UL — SIGNIFICANT CHANGE UP (ref 0–0)
NRBC # FLD: 0 K/UL — SIGNIFICANT CHANGE UP (ref 0–0)
NRBC BLD AUTO-RTO: 0 /100 WBCS — SIGNIFICANT CHANGE UP (ref 0–0)
PHOSPHATE SERPL-MCNC: 4.6 MG/DL — HIGH (ref 2.5–4.5)
PLATELET # BLD AUTO: 369 K/UL — SIGNIFICANT CHANGE UP (ref 150–400)
POTASSIUM SERPL-MCNC: 3.6 MMOL/L — SIGNIFICANT CHANGE UP (ref 3.5–5.3)
POTASSIUM SERPL-SCNC: 3.6 MMOL/L — SIGNIFICANT CHANGE UP (ref 3.5–5.3)
RBC # BLD: 3.22 M/UL — LOW (ref 4.2–5.8)
RBC # FLD: 22.5 % — HIGH (ref 10.3–14.5)
SODIUM SERPL-SCNC: 139 MMOL/L — SIGNIFICANT CHANGE UP (ref 135–145)
WBC # BLD: 8.25 K/UL — SIGNIFICANT CHANGE UP (ref 3.8–10.5)
WBC # FLD AUTO: 8.25 K/UL — SIGNIFICANT CHANGE UP (ref 3.8–10.5)

## 2025-04-20 PROCEDURE — 99232 SBSQ HOSP IP/OBS MODERATE 35: CPT

## 2025-04-20 RX ORDER — HYDROMORPHONE/SOD CHLOR,ISO/PF 2 MG/10 ML
0.5 SYRINGE (ML) INJECTION ONCE
Refills: 0 | Status: DISCONTINUED | OUTPATIENT
Start: 2025-04-20 | End: 2025-04-20

## 2025-04-20 RX ADMIN — AMLODIPINE BESYLATE 10 MILLIGRAM(S): 10 TABLET ORAL at 05:26

## 2025-04-20 RX ADMIN — Medication 1 APPLICATION(S): at 23:07

## 2025-04-20 RX ADMIN — GABAPENTIN 900 MILLIGRAM(S): 400 CAPSULE ORAL at 05:26

## 2025-04-20 RX ADMIN — SODIUM HYPOCHLORITE 1 APPLICATION(S): 0.12 SOLUTION TOPICAL at 17:48

## 2025-04-20 RX ADMIN — OXYCODONE HYDROCHLORIDE 10 MILLIGRAM(S): 30 TABLET ORAL at 08:28

## 2025-04-20 RX ADMIN — OXYCODONE HYDROCHLORIDE 10 MILLIGRAM(S): 30 TABLET ORAL at 17:47

## 2025-04-20 RX ADMIN — LIDOCAINE HYDROCHLORIDE 1 PATCH: 20 JELLY TOPICAL at 00:20

## 2025-04-20 RX ADMIN — Medication 40 MILLIGRAM(S): at 06:10

## 2025-04-20 RX ADMIN — Medication 3 MILLIGRAM(S): at 21:49

## 2025-04-20 RX ADMIN — CLOTRIMAZOLE 1 APPLICATION(S): 1 CREAM TOPICAL at 17:48

## 2025-04-20 RX ADMIN — MUPIROCIN CALCIUM 1 APPLICATION(S): 20 CREAM TOPICAL at 17:48

## 2025-04-20 RX ADMIN — LIDOCAINE HYDROCHLORIDE 1 PATCH: 20 JELLY TOPICAL at 11:19

## 2025-04-20 RX ADMIN — Medication 667 MILLIGRAM(S): at 08:28

## 2025-04-20 RX ADMIN — Medication 25 GRAM(S): at 13:54

## 2025-04-20 RX ADMIN — SEVELAMER HYDROCHLORIDE 800 MILLIGRAM(S): 800 TABLET ORAL at 17:47

## 2025-04-20 RX ADMIN — LIDOCAINE HYDROCHLORIDE 1 PATCH: 20 JELLY TOPICAL at 19:34

## 2025-04-20 RX ADMIN — QUETIAPINE FUMARATE 25 MILLIGRAM(S): 25 TABLET ORAL at 21:49

## 2025-04-20 RX ADMIN — MUPIROCIN CALCIUM 1 APPLICATION(S): 20 CREAM TOPICAL at 05:25

## 2025-04-20 RX ADMIN — DULOXETINE 60 MILLIGRAM(S): 20 CAPSULE, DELAYED RELEASE ORAL at 11:19

## 2025-04-20 RX ADMIN — Medication 25 GRAM(S): at 21:50

## 2025-04-20 RX ADMIN — APIXABAN 5 MILLIGRAM(S): 2.5 TABLET, FILM COATED ORAL at 05:26

## 2025-04-20 RX ADMIN — GABAPENTIN 900 MILLIGRAM(S): 400 CAPSULE ORAL at 21:49

## 2025-04-20 RX ADMIN — Medication 25 GRAM(S): at 05:25

## 2025-04-20 RX ADMIN — Medication 2 TABLET(S): at 21:49

## 2025-04-20 RX ADMIN — SODIUM HYPOCHLORITE 1 APPLICATION(S): 0.12 SOLUTION TOPICAL at 05:25

## 2025-04-20 RX ADMIN — Medication 1 APPLICATION(S): at 11:20

## 2025-04-20 RX ADMIN — Medication 1 APPLICATION(S): at 05:25

## 2025-04-20 RX ADMIN — Medication 1 TABLET(S): at 11:19

## 2025-04-20 RX ADMIN — SEVELAMER HYDROCHLORIDE 800 MILLIGRAM(S): 800 TABLET ORAL at 12:45

## 2025-04-20 RX ADMIN — Medication 0.5 MILLIGRAM(S): at 13:55

## 2025-04-20 RX ADMIN — Medication 0.5 MILLIGRAM(S): at 18:55

## 2025-04-20 RX ADMIN — Medication 0.5 MILLIGRAM(S): at 05:25

## 2025-04-20 RX ADMIN — Medication 1 APPLICATION(S): at 05:24

## 2025-04-20 RX ADMIN — POLYETHYLENE GLYCOL 3350 17 GRAM(S): 17 POWDER, FOR SOLUTION ORAL at 11:19

## 2025-04-20 RX ADMIN — GABAPENTIN 900 MILLIGRAM(S): 400 CAPSULE ORAL at 13:55

## 2025-04-20 RX ADMIN — SEVELAMER HYDROCHLORIDE 800 MILLIGRAM(S): 800 TABLET ORAL at 08:28

## 2025-04-20 RX ADMIN — APIXABAN 5 MILLIGRAM(S): 2.5 TABLET, FILM COATED ORAL at 17:47

## 2025-04-20 RX ADMIN — Medication 1 APPLICATION(S): at 17:48

## 2025-04-20 RX ADMIN — CLOTRIMAZOLE 1 APPLICATION(S): 1 CREAM TOPICAL at 05:25

## 2025-04-20 RX ADMIN — CYCLOBENZAPRINE HYDROCHLORIDE 5 MILLIGRAM(S): 15 CAPSULE, EXTENDED RELEASE ORAL at 11:19

## 2025-04-20 RX ADMIN — LIDOCAINE HYDROCHLORIDE 1 PATCH: 20 JELLY TOPICAL at 11:20

## 2025-04-20 NOTE — PROGRESS NOTE ADULT - SUBJECTIVE AND OBJECTIVE BOX
Patient is a 39y old  Male who presents with a chief complaint of sob (02 Mar 2025 06:19)      SUBJECTIVE / OVERNIGHT EVENTS:    MEDICATIONS  (STANDING):  amLODIPine   Tablet 10 milliGRAM(s) Oral daily  apixaban 5 milliGRAM(s) Oral every 12 hours  capsaicin HP 0.075% Cream 1 Application(s) Topical four times a day  chlorhexidine 2% Cloths 1 Application(s) Topical <User Schedule>  clotrimazole 1% Cream 1 Application(s) Topical two times a day  Dakins Solution - 1/4 Strength 1 Application(s) Topical two times a day  dextrose 50% Injectable 25 Gram(s) IV Push once  dextrose 50% Injectable 12.5 Gram(s) IV Push once  dextrose 50% Injectable 25 Gram(s) IV Push once  DULoxetine 60 milliGRAM(s) Oral daily  gabapentin 900 milliGRAM(s) Oral every 8 hours  glucagon  Injectable 1 milliGRAM(s) IntraMuscular once  insulin lispro (ADMELOG) corrective regimen sliding scale   SubCutaneous three times a day before meals  insulin lispro (ADMELOG) corrective regimen sliding scale   SubCutaneous at bedtime  lidocaine   4% Patch 1 Patch Transdermal daily  lidocaine   4% Patch 1 Patch Transdermal daily  melatonin 3 milliGRAM(s) Oral at bedtime  multivitamin 1 Tablet(s) Oral daily  mupirocin 2% Ointment 1 Application(s) Topical two times a day  pantoprazole    Tablet 40 milliGRAM(s) Oral before breakfast  piperacillin/tazobactam IVPB.. 3.375 Gram(s) IV Intermittent every 8 hours  polyethylene glycol 3350 17 Gram(s) Oral daily  QUEtiapine 25 milliGRAM(s) Oral at bedtime  senna 2 Tablet(s) Oral at bedtime  sevelamer carbonate 800 milliGRAM(s) Oral three times a day with meals    MEDICATIONS  (PRN):  acetaminophen     Tablet .. 650 milliGRAM(s) Oral every 6 hours PRN Temp greater or equal to 38C (100.4F), Mild Pain (1 - 3), Moderate Pain (4 - 6)  albuterol/ipratropium for Nebulization 3 milliLiter(s) Nebulizer every 6 hours PRN Shortness of Breath and/or Wheezing  cyclobenzaprine 5 milliGRAM(s) Oral three times a day PRN Muscle Spasm  dextrose Oral Gel 15 Gram(s) Oral once PRN Blood Glucose LESS THAN 70 milliGRAM(s)/deciliter  HYDROmorphone  Injectable 0.5 milliGRAM(s) IV Push every 12 hours PRN dressing changes and severe breakthrough  oxyCODONE    IR 10 milliGRAM(s) Oral every 6 hours PRN Severe Pain (7 - 10)        CAPILLARY BLOOD GLUCOSE      POCT Blood Glucose.: 103 mg/dL (20 Apr 2025 07:43)  POCT Blood Glucose.: 104 mg/dL (19 Apr 2025 21:09)  POCT Blood Glucose.: 123 mg/dL (19 Apr 2025 17:03)  POCT Blood Glucose.: 115 mg/dL (19 Apr 2025 11:07)    I&O's Summary    19 Apr 2025 07:01  -  20 Apr 2025 07:00  --------------------------------------------------------  IN: 0 mL / OUT: 2500 mL / NET: -2500 mL        PHYSICAL EXAM:  GENERAL: NAD, well-developed  HEAD:  Atraumatic, Normocephalic  EYES: EOMI, PERRLA, conjunctiva and sclera clear  NECK: Supple, No JVD  CHEST/LUNG: Clear to auscultation bilaterally; No wheeze  HEART: Regular rate and rhythm; No murmurs, rubs, or gallops  ABDOMEN: Soft, Nontender, Nondistended; Bowel sounds present  EXTREMITIES:  2+ Peripheral Pulses, No clubbing, cyanosis, or edema  PSYCH: AAOx3  NEUROLOGY: non-focal  SKIN: No rashes or lesions    LABS:                        8.3    8.25  )-----------( 369      ( 20 Apr 2025 06:05 )             28.2     04-20    139  |  98  |  13  ----------------------------<  94  3.6   |  30  |  1.43[H]    Ca    10.0      20 Apr 2025 06:05  Phos  4.6     04-20  Mg     1.70     04-20            Urinalysis Basic - ( 20 Apr 2025 06:05 )    Color: x / Appearance: x / SG: x / pH: x  Gluc: 94 mg/dL / Ketone: x  / Bili: x / Urobili: x   Blood: x / Protein: x / Nitrite: x   Leuk Esterase: x / RBC: x / WBC x   Sq Epi: x / Non Sq Epi: x / Bacteria: x        RADIOLOGY & ADDITIONAL TESTS:    Imaging Personally Reviewed:    Consultant(s) Notes Reviewed:      Care Discussed with Consultants/Other Providers:   Patient is a 39y old  Male who presents with a chief complaint of sob (02 Mar 2025 06:19)      SUBJECTIVE / OVERNIGHT EVENTS: No acute overnight events    MEDICATIONS  (STANDING):  amLODIPine   Tablet 10 milliGRAM(s) Oral daily  apixaban 5 milliGRAM(s) Oral every 12 hours  capsaicin HP 0.075% Cream 1 Application(s) Topical four times a day  chlorhexidine 2% Cloths 1 Application(s) Topical <User Schedule>  clotrimazole 1% Cream 1 Application(s) Topical two times a day  Dakins Solution - 1/4 Strength 1 Application(s) Topical two times a day  dextrose 50% Injectable 25 Gram(s) IV Push once  dextrose 50% Injectable 12.5 Gram(s) IV Push once  dextrose 50% Injectable 25 Gram(s) IV Push once  DULoxetine 60 milliGRAM(s) Oral daily  gabapentin 900 milliGRAM(s) Oral every 8 hours  glucagon  Injectable 1 milliGRAM(s) IntraMuscular once  insulin lispro (ADMELOG) corrective regimen sliding scale   SubCutaneous three times a day before meals  insulin lispro (ADMELOG) corrective regimen sliding scale   SubCutaneous at bedtime  lidocaine   4% Patch 1 Patch Transdermal daily  lidocaine   4% Patch 1 Patch Transdermal daily  melatonin 3 milliGRAM(s) Oral at bedtime  multivitamin 1 Tablet(s) Oral daily  mupirocin 2% Ointment 1 Application(s) Topical two times a day  pantoprazole    Tablet 40 milliGRAM(s) Oral before breakfast  piperacillin/tazobactam IVPB.. 3.375 Gram(s) IV Intermittent every 8 hours  polyethylene glycol 3350 17 Gram(s) Oral daily  QUEtiapine 25 milliGRAM(s) Oral at bedtime  senna 2 Tablet(s) Oral at bedtime  sevelamer carbonate 800 milliGRAM(s) Oral three times a day with meals    MEDICATIONS  (PRN):  acetaminophen     Tablet .. 650 milliGRAM(s) Oral every 6 hours PRN Temp greater or equal to 38C (100.4F), Mild Pain (1 - 3), Moderate Pain (4 - 6)  albuterol/ipratropium for Nebulization 3 milliLiter(s) Nebulizer every 6 hours PRN Shortness of Breath and/or Wheezing  cyclobenzaprine 5 milliGRAM(s) Oral three times a day PRN Muscle Spasm  dextrose Oral Gel 15 Gram(s) Oral once PRN Blood Glucose LESS THAN 70 milliGRAM(s)/deciliter  HYDROmorphone  Injectable 0.5 milliGRAM(s) IV Push every 12 hours PRN dressing changes and severe breakthrough  oxyCODONE    IR 10 milliGRAM(s) Oral every 6 hours PRN Severe Pain (7 - 10)        CAPILLARY BLOOD GLUCOSE      POCT Blood Glucose.: 103 mg/dL (20 Apr 2025 07:43)  POCT Blood Glucose.: 104 mg/dL (19 Apr 2025 21:09)  POCT Blood Glucose.: 123 mg/dL (19 Apr 2025 17:03)  POCT Blood Glucose.: 115 mg/dL (19 Apr 2025 11:07)    I&O's Summary    19 Apr 2025 07:01  -  20 Apr 2025 07:00  --------------------------------------------------------  IN: 0 mL / OUT: 2500 mL / NET: -2500 mL        PHYSICAL EXAM:  GENERAL: NAD, well-developed, Morbidly Obese  HEAD:  Atraumatic, Normocephalic  EYES: EOMI, PERRLA, conjunctiva and sclera clear  NECK: Supple, No JVD  CHEST/LUNG: Clear to auscultation bilaterally, diminished at bases bilaterally; No wheeze  HEART: Regular rate and rhythm; S1 S2 present  ABDOMEN: Soft, Nontender, Nondistended; Bowel sounds present  EXTREMITIES:  2+ Peripheral Pulses, No clubbing, cyanosis, or edema  PSYCH: AAOx3  NEUROLOGY: non-focal  SKIN: No rashes or lesions. Refer to RN assessment & skin care    LABS:                        8.3    8.25  )-----------( 369      ( 20 Apr 2025 06:05 )             28.2     04-20    139  |  98  |  13  ----------------------------<  94  3.6   |  30  |  1.43[H]    Ca    10.0      20 Apr 2025 06:05  Phos  4.6     04-20  Mg     1.70     04-20            Urinalysis Basic - ( 20 Apr 2025 06:05 )    Color: x / Appearance: x / SG: x / pH: x  Gluc: 94 mg/dL / Ketone: x  / Bili: x / Urobili: x   Blood: x / Protein: x / Nitrite: x   Leuk Esterase: x / RBC: x / WBC x   Sq Epi: x / Non Sq Epi: x / Bacteria: x        RADIOLOGY & ADDITIONAL TESTS:    < from: MR Pelvis Bony Only w/wo IV Cont (04.11.25 @ 15:53) >  ACC: [id number] EXAM:  MR PELVIS BONY ONLY WAW IC   ORDERED BY: FELICIANO CISNEROS     PROCEDURE DATE:  04/11/2025          INTERPRETATION:  MR PELVIS BONY WITHOUT AND WITH IV CONTRAST    HISTORY: Evaluate for osteomyelitis. Right iliac activity on recent bone   scan.    TECHNIQUE:  Multiplanar MRI of the pelvis was performed without and with   10 cc administered Gadavist intravenous contrast.    COMPARISON:  Nuclear medicine bone scan dated 4/8/2025. CT abdomen and   pelvis dated 4/3/2025.    FINDINGS:    OSSEOUS STRUCTURES    Fractures/Stress Reactions:  None.    Osteomyelitis: None.    VISUALIZED SPINE  S1 is transitional and lumbarized. Moderate L5-S1 degenerative disc   disease.    PELVIC JOINTS    Sacroiliac Joints:  Preserved.    Symphysis Pubis:  Preserved.    RIGHT HIP JOINT    Avascular Necrosis:  None.    Articular Cartilage:  Grossly preserved given the large field of view.    Effusion/Synovitis:  Small effusion with mild synovitis.    RIGHT HIP TENDONS    Gluteus Minimus Tendon:  Intact.    Gluteus Medius Tendon:  Mild to moderate tendinopathy.    Iliopsoas Tendon:  Intact.    Common Hamstring Tendon:  Intact.    Bursa:  None.    LEFT HIP JOINT    Avascular Necrosis:  None.    Articular Cartilage:  Grossly preserved given the large field of view.    Effusion/Synovitis:  Small effusion with mild synovitis.    LEFT HIP TENDONS    Gluteus Minimus Tendon:  Intact.    Gluteus Medius Tendon:  Mild to moderate tendinopathy.    Iliopsoas Tendon:  Intact.    Common Hamstring Tendon:  Intact.    Bursa:  None.    SOFT TISSUES    Pelvis/Abdomen:  Moderate fecal distention of the visualized sigmoid   colon.    Musculature:  Moderate to severe generalized edema of the bilateral   gluteal musculature. Mild lower paraspinal, adductor, and quadriceps   muscle edema. Sacral decubitus wound with region of decreased enhancement   of the right gluteus zurdo measuring up to 11.9 cm transversely. Tract   with small amount of fluid and gas is again seen extending superiorly   within the right gluteal subcutaneous tissues to the level of L5.    Subcutaneous Tissues:  Mild gluteal edema around the sacral wound with   the tract extending superiorly to the level of L5.    NEUROVASCULAR STRUCTURES    Sacral Neuroforamina:  Widely patent.    Neural Plexuses:  Maintained.    IMPRESSION:  1.  No osteomyelitis.  2.  Sacral decubitus wound is again seen extending superiorly along the   superficial margin of the right gluteus zurdo to the level of L5 with   small amount of fluid and gas within the tract.  3.  The underlying gluteus zurdo demonstrates a region of decreased   enhancement consistent with ischemia or necrosis measuring up to 11.9 cm   transversely.  4.  Muscle edema suggests a nonspecific myositis.  5.  Small bilateral hip joint effusions with mild synovitis is   nonspecific.    --- End of Report ---    < end of copied text >  < from: NM Inflammatory Loc Wholebody WBC, Single Day (04.08.25 @ 15:12) >  ACC: [id number] EXAM:  NM MULTI DAY PROCEDURE   ORDERED BY: CRYSTAL PATEL     ACC: [id number] EXAM:  NM INFLAMM LOC WBC WB SD   ORDERED BY: CRYSTAL PATEL     PROCEDURE DATE:  04/08/2025          INTERPRETATION:  HISTORY/REASON FOR EXAM: 39-year-old male with shortness   of breath, lower extremity edema and fungemia. Referred for evaluation of   focal infection.    RADIOPHARMACEUTICAL: 530 uCi In-111 labeled autologous leukocytes.    TECHNIQUE: Approximately 18-20 hours following administration of 0.553    mCi In-111 labeled autologous leukocytes, whole body imaging in anterior   and posterior views was performed. Additional static images of both   femurs and both feet were obtained.  SPECT of the pelvis was planned but   could not be performed due to technical limitations to accommodate   patient's habitus and only anterior and posterior static images of the   pelvis could be obtained.    COMPARISON: CT chest, abdomen and pelvis 04/03/2025.    SCINTIGRAPHIC FINDINGS: Increased activity could be seen in the right   right side of the pelvis more intense on posterior image but could also   be seen on the anterior image indicating a considerable area of soft   tissue infection. There is also slightly more intense activity in the   right iliac bone compared to the left raising possibility of   osteomyelitis.    Intense activity around the lateral aspect of the right forefoot   compatible with soft tissue infection, presence of complicating   osteomyelitis is not excluded. Milder activity in the medial aspect of   the left forefoot may reflect soft tissue infection. Please correlate   findings with clinical examination.    No focal abnormal activity in the lungs that is suggestive of pneumonia.    IMPRESSION:  1. Large area of soft tissue infection in the right side of the pelvis as   detailed above. Slightly more intense activity in the right iliac bone   compared to the left raises possibility of osteomyelitis.    2. Intense activity in the lateral aspect of the right forefoot   compatible with soft tissue infection; complicating osteomyelitis is not   excluded. Milder activity in the medial aspect of the left forefoot may   reflect soft tissue infection. Please correlate findings with clinical   examination.    3.No evidence of pneumonia.    --- End of Report ---    < end of copied text >  < from: NM Multiple Day Procedure (04.07.25 @ 22:44) >  ACC: [id number] EXAM:  NM MULTI DAY PROCEDURE   ORDERED BY: CRYSTAL PATEL     ACC: [id number] EXAM:  NM INFLAMM LOC WBC WB SD   ORDERED BY: CRYSTAL PATEL     PROCEDURE DATE:  04/08/2025          INTERPRETATION:  HISTORY/REASON FOR EXAM: 39-year-old male with shortness   of breath, lower extremity edema and fungemia. Referred for evaluation of   focal infection.    RADIOPHARMACEUTICAL: 530 uCi In-111 labeled autologous leukocytes.    TECHNIQUE: Approximately 18-20 hours following administration of 0.553    mCi In-111 labeled autologous leukocytes, whole body imaging in anterior   and posterior views was performed. Additional static images of both   femurs and both feet were obtained.  SPECT of the pelvis was planned but   could not be performed due to technical limitations to accommodate   patient's habitus and only anterior and posterior static images of the   pelvis could be obtained.    COMPARISON: CT chest, abdomen and pelvis 04/03/2025.    SCINTIGRAPHIC FINDINGS: Increased activity could be seen in the right   right side of the pelvis more intense on posterior image but could also   be seen on the anterior image indicating a considerable area of soft   tissue infection. There is also slightly more intense activity in the   right iliac bone compared to the left raising possibility of   osteomyelitis.    Intense activity around the lateral aspect of the right forefoot   compatible with soft tissue infection, presence of complicating   osteomyelitis is not excluded. Milder activity in the medial aspect of   the left forefoot may reflect soft tissue infection. Please correlate   findings with clinical examination.    No focal abnormal activity in the lungs that is suggestive of pneumonia.    IMPRESSION:  1. Large area of soft tissue infection in the right side of the pelvis as   detailed above. Slightly more intense activity in the right iliac bone   compared to the left raises possibility of osteomyelitis.    2. Intense activity in the lateral aspect of the right forefoot   compatible with soft tissue infection; complicating osteomyelitis is not   excluded. Milder activity in the medial aspect of the left forefoot may   reflect soft tissue infection. Please correlate findings with clinical   examination.    3.No evidence of pneumonia.    --- End of Report ---    < end of copied text >      Imaging Personally Reviewed:    Consultant(s) Notes Reviewed:      Care Discussed with Consultants/Other Providers:

## 2025-04-20 NOTE — PROGRESS NOTE ADULT - NS ATTEND AMEND GEN_ALL_CORE FT
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     #Acute hypoxemic and hypercapnic respiratory failure s/p tracheostomy placement   - Decannulated 3/28, continues to use Bilevel NIV qhs    #RV failure 2/2 pulmHTN   - Maintaining euvolemia     #AFib - rate controlled     #DVT right IJ and bilateral peroneal veins - AC for DVT and AFib, held for surgery, - no significant hemorrhage, transition to Eliquis    #Transaminitis improving  -Hepatomegaly will need to follow as outpatient    #Fungemia  #Sacral ulceration with induration  - NM WBC scan performed  concerning for active infection with possible osteomyelitis of right pelvis/iliac bone. MRI unrevealing but given high clinical suspicion treating as OM. Pt will need long-term Abx, on Zosyn per ID until 5/11. Pt on caspofungin 4/15. Surgery consulted  - patient sp OR wound debridement on 4/15    #Pain  - Neuropathic pain  Evaluated by chronic pain service 4/8 as patient continues to have uncontrolled pain in the LE - Gabapentin now 900 q8h, Flexeril and increased to Cymbalta 60 mg po daily.  oxycodone 10 q6h prn, breakthrough Dilaudid 0.5 mg IV q12h for dressing changes. Will attempt to transition to po regimen   - also sp debridement of sacral wound   - pain service evaluated.    Roderick Ko MD-Pulmonary   [phone] as above: no new events  40 yo Male with obesity (Class III, BMI 69), pAfib (no AC), HTN, BEA (2019 AHI 34 non-compliant on CPAP [id number]), and history of b/l papilledema attributed to pseudotumor cerebri initially presenting as a transfer  on 2/26 for acute hypoxemic respiratory failure s/p ETT intubation/MV with progression to refractory ARDS s/p initiation of vv-ECMO support (2/26-3/7) with failure to wean from ventilator s/p tracheostomy placement, further found to have RV failure s/p aggressive diuresis and PNA followed by severe sepsis 2/2 panniculitis c/b candida albicans fungemia now transferred to [facility] for further medical management.     #Acute hypoxemic and hypercapnic respiratory failure s/p tracheostomy placement   - Decannulated 3/28, continues to use Bilevel NIV qhs    #RV failure 2/2 pulmHTN   - Maintaining euvolemia     #AFib - rate controlled     #DVT right IJ and bilateral peroneal veins - AC for DVT and AFib, held for surgery, - no significant hemorrhage, transition to Eliquis    #Transaminitis improving  -Hepatomegaly will need to follow as outpatient    #Fungemia  #Sacral ulceration with induration  - NM WBC scan performed  concerning for active infection with possible osteomyelitis of right pelvis/iliac bone. MRI unrevealing but given high clinical suspicion treating as OM. Pt will need long-term Abx, on Zosyn per ID until 5/11. Pt on caspofungin 4/15. Surgery consulted  - patient sp OR wound debridement on 4/15    #Pain  - Neuropathic pain  Evaluated by chronic pain service 4/8 as patient continues to have uncontrolled pain in the LE - Gabapentin now 900 q8h, Flexeril and increased to Cymbalta 60 mg po daily.  oxycodone 10 q6h prn, breakthrough Dilaudid 0.5 mg IV q12h for dressing changes. Will attempt to transition to po regimen   - also sp debridement of sacral wound   - pain service evaluated.    Roderick Ko MD-Pulmonary   [phone]

## 2025-04-20 NOTE — PROGRESS NOTE ADULT - ASSESSMENT
38 YO M with PMHx of morbid obesity, BEA on CPAP, pAFIB (not on AC), HTN, and BL papilledema attributed to pseudotumor cerebri who presented initially to [facility] on 2/24 with SOB and BL LE edema. Found with URI outpatient with progressive SOB, and concern for noted for MFPNA on imaging. Course progressed with AHRF with worsening O2 demand, respiratory distress, secretions, and somnolence requiring intubation (difficult with success after 6 attempts) in ED and admission to [facility] MICU. While in MICU, patient noted with increasing O2 demand with no improvement despite optimal vent management. Concern noted for progressive ARDS, unable to prone given morbid obesity, and ultimately cannulated for vvECMO on 2/25 and transferred to [facility] for further management on 2/26. While at [facility], tx with diuresis and ABX, and ultimately decannulated and s/p 60XLTCP trach and PEG on 3/7. Patient ultimately transferred to [facility] on 3/11 and course complicated by recurrent high grade fevers and found with fungemia.    NEUROLOGY  # Hx of Pseudotumor Cerebri   - s/p LP in 2024 with high opening pressure  - s/p MRI 2024 with possible pituitary mass but unclear because of pressure effect from pseudotumor cerebri causing partially empty sella.  - Prolactin elevated   - ACTH low but received steroids during admission   - FT4 low normal and TSH normal, but given FT4 low normal TSH should be higher   - Discuss endocrine consult for inhouse work up vs outpatient.     # Sedation   - Weaned off sedation in ICU   - Nimbex turned off 2/27   - Prop turned off 3/9   - Precedex turned off and catapres started 3/11  - catapres 0.2 TID (decreased 3/25) - tapered down 3/31 to 0.1 tid - discontinued 4/3     # Insomnia   - Patient worked night shift x 15 years   - Trouble sleeping at night leading to day time sleepiness and poor participation with PT  - Continue on Seroquel 25 QHS (increased 3/18)   - Continue on melatonin   - Continue on Neurontin as below    # BL LE neuropathy   - CIPN concern   - Neurontin increased to 300mg Q8H with relief   - Pain mgt called to consult - recs for tylenol ATC, NEurontin increased to 400mg q6, Oxycodone 5 q4h prn , Dilaudid for BREAKTHROUGH pain only, Lidocaine patches on each leg, ice prn to feet  - Some improvement in pain - PT consult for TENS,   - oxycodone inc to 10mg as pt with no relief /buttocks area indurated /tender CT ordered -no abscess  - C/w Gabapentin : 600/600/800 and up titrate as required  - Switched to PO Dilaudid 2mg Q6H PRN moderate-severe and IV Dilaudid PRN severe breakthrough and with dressing changes   - Still with severe pain 10/10 despite change to Dilaudid will increase to 4mg q4h prn with iv Dilaudid breakthrough  - Neurontin increased to 800mg q8h Monitor neuro status/ lethargy   - I have reviewed with pt to not wait to long for dose of pain medication to  prevent pain from escalating   - Meds reviewed despite being in constant pain pt not taking Dilaudid as often as he can often with 10/10 pain Dilaudid will be every 4 hours with parameters and pt can refuse Will reconsult pain mgt again for any recommendations on Monday   - Pain management reconsulted , recs to stop Dilaudid PO ATC , continue Dilaudid for breakthrough pain with dressing changes, increase gabapentin to 900 mg Q8, increased Cymbalta to 60 mg QD and Flexeril 5mg Q8  - Neurontin inc to 900 with some decrease in pain but still severe   - Will do trial of capsaicin bid with strict instructions to avoid open /broken irritated skin/ and bandaged areas     CARDIOVASCULAR  # HTN   - HTN noted in ICU requiring cardene and esmolol GTTs   - Lisinopril dc'ed to increase catapres   - Continue on norvasc 10, catapres weaned off 4/3  - Monitor BP     # AFIB   - Hx of pAFIB   - No RVR episodes or pAFIB episodes in ICU   - No rate control medications needed  - Monitor on telemetry     # RV dilation second to PHTN   - POCUS on arrival to [facility] with RVE concern   - TTE 10/2024 with EF 55-60 with normal LVSF, moderate LVH and normal RVSF and size with no concern for pHTN   - TTE on 2/25 at  with EF 61 and normal LVSF, but enlarged RV size with reduced RVSF, mild TR, mild PHTN with PASP 49, and dilated IVC to 3.4cn, but normal TAPSE 2.1.  - Continue on aggressive diuresis in ICU    - TTE 3/11 with RVSF reduced with TAPSE 1.6. IVC improved to 2.12cm.   - Lasix 40 PO QD discontinued 4/7 secondary to ELLA  - Monitor IO/ BMP    RESPIRATORY  # ARDS second to MFPNA   - Hx of BEA on CPAP presented to  post URI with SOB and found with AHRF with progression to ARDS second to post viral MFPNA   - Intubated 2/25   - Cannulated for vvECMO 2/26   - s/p 60XLTCP tracheostomy 3/7   - Decannulated 3/7   - CTSx removed tracheostomy and ECMO sutures on 3/17   - Continue on TC QD with PMV as able with strong phonation  - Continue on nebs and HTS   -  trials continue and now trialing overnight with BIPAP for OHS (10/8/12)  - Continue on PS 18/10/40 QHS given OHS   - Decannulated 3/28   - c/w nasal cannula 2L     GI  # Dysphagia   - s/p PEG 3/7   - Attempted green dye on 3/13 and failed  - Continue on TF  - Continue on miralax and senna  - RPT green dye passed 3/19  - 3/20 Passed FEEST - on soft bite sized with thin liquids Added consistent carb 2/2 weight/Elevated A1c   - PEG removed in OR DSD over site 4/15     # Mild transaminitis   - LFTs elevated in ICU with TBILI elevated likely from ECMO hemolysis   - US ABD with hepatic steatosis   - Trend LFTs      RENAL  # ELLA   - ELLA likely from cardiorenal with RVE   - Diuresed in ICU and improved   - Monitor renal function and UOP   - Recurrent ELLA noted suspected in s/o vancomycin toxicity (4/7)   - UA negative and Urine Studies FeNa 0.9 % suggest pre-renal etiology  - Lasix discontinued for now  - s/p IV fluid trial on 4/8 with minimal improvement  - Trialing additional fluids on 4/10   - Trend BMP and I/Os    # Hypernatremia (resolved)  - Hypernatremia with fever spikes   - Fever curve improved and attempting to wean FWF   - now off FWF    INFECTIOUS DISEASE  # Recurrent fevers with fungemia from unknown source   - Recurrent fevers post ECMO decannulation thought initially to be cytokine surge   - BCx, SCx, UCx, RVP and MRSA RPT on 3/12 negative   - Completed zosyn (3/12-3/18) empirically with improved WBC , however recurrent fevers noted.   - RPT SCx, UA, CXR and RVP negative  - TTE 3/17 with no IE  - PanCT 3/17 with PNA and persistent panniculitis  - BCx 3/15 and 3/16 with candida albicans, rpt BCx 3/18 and 3/20 negative   - Concern for possible source from panniculitis   - Continue on Caspofungin 70 mg daily 3/17 - 4/15, ID consult appreciated   - 3/30 spiked temp cx's sent + ua and c/s pending Bcx neg to date - started on vanco /zosyn ID following  - Vanco stopped (4/7) due to ELLA concern for Vanco toxicty and continued on empiric Zosyn/ Caspofungin   - 4/3 - persistent fevers, leukocytosis. Will repeat CT C/A/P to evaluate for source - no source found - Bilat lung opacities mildly decreased     - 4/8 bone scan: : "1. Large area of soft tissue infection in the R side of the pelvis as detailed above. Slightly more intense activity in the R iliac bone compared to the L raises possibility of OM. 2. Intense activity in the lateral aspect of the R forefoot compatible with soft tissue infection; complicating osteomyelitis is not excluded. Milder activity in the medial aspect of the L forefoot may reflect soft tissue infection."  - ID consult appreciated. Continue on Zosyn 3.375 g 8, plan on 6 week course for possible OM until 5/11 and s/p Caspofungin 70 mg daily, which ended 4/15  - MR Pelvis Bony (4/11): No osteomyelitis. Sacral decubitus wound is again seen extending superiorly along the superficial margin of the right gluteus zurdo to the level of L5 with small amount of fluid and gas within the tract. The underlying gluteus zurdo demonstrates a region of decreased enhancement consistent with ischemia or necrosis measuring up to 11.9 cm transversely. Muscle edema suggests a nonspecific myositis. Small bilateral hip joint effusions with mild synovitis is nonspecific.  - S/P sacral wound debridement with cultures growing Providencia and Bacteroides (4/15)  - Caspofungin finished 4/15    # MFPNA and abdominal pannus cellulitis   - Presented to HH post URI with SOB and found with AHRF with progression to ARDS second to post viral MFPNA   - RVP, legionella, strept, BAL, BCx, UCx, HIV, PCP, and adenovirus PCR negative   - Initially started on multiple agents in ICU and ultimately completed zosyn (2/26-3/9) ZMAX (2/25-2/26) and vanco (2/26-3/1)     # R/o Sacral OM  - Noted with hard indurated sacral wound by Wound Care Team  - CT Pelvis (3/29) revealing superficial skin thickening overlying the sacrum with underlying edema of the subcutaneous fat, in keeping with the clinical history of sacral wound. No evidence of underlying tract or drainable fluid collection.  - CT 4/3 - no fluid collection in sacral tissue   - Given ongoing fever with no clear source with obtain MRI Pelvis per ID recs to r/o sacral OM once renal fx improves  - S/P sacral wound debridement with cultures growing Providencia and Bacteroides (4/15)    # Penile discharge   - GC/ chlamydia negative   - HIV negative   - Monitor for now    # PPX   - MRSA PCR positive and completed bactroban (2/26-3/3)     HEME   # Anemia likely second to ECMO circuit vs AOCD   - HH low in ICU second to ECMO circuit   - HH dropping again today however no bleeding noted   - Microcytic and hemochromic, sent anemia panel 3/19  - Trend HH     VASCULAR   # R IJ and BL LE DVTs   - Post ECMO dopplers on 3/9 negative for BL UE/ LE DVTs.   - Subsequent post ECMO dopplers performed on 3/14 and noted with acute, non-occlusive deep vein thrombosis noted within the right internal jugular vein. Acute, occlusive deep vein thromboses noted within the right and left peroneal veins at both mid calves, and age-indeterminate, occlusive deep vein thrombosis visualized within the left gastrocnemius vein at the proximal calf.     - Continue on argatroban GTT and changed to Eliquis   - S/P heparin gtt prior to PEG removal now transitioned back to Eliquis (4/17)    PODIATRY   # BL plantar feet blisters likely pressors induced  - Seen by podiatry and blisters lanced on 3/4 and again on 3/18  - Continue on local wound care per podiatry   - Podiatry following  - OK for WBAT will fu with PT for offloading shoe if appropriate  - Pain mgt consult   - Podiatry FU 3/28 / 4/3 done /following     ENDOCRINE  # Pre-DM2   - A1C 6.7  - Continue on ISS   - Monitor FS   - IF no demand then stop ISS     LINES/ TUBES  - R IJ and R FEM ECMO (2/26-3/7)     SKIN  # Pannus canndial intertrigo   # Sacrum/ buttock MAD  - Continue on clotrimazole cream   - WOC appreciated   -Seen by WC pt noted to have oozing from Buttock wound surgicel placed by wound care On follow up no further bleeding  - Wound care placed orders   - No bleeding from wound     ETHICS/ GOC    - FULL CODE     DISPO - PT following  Seen by PMR for acute rehab per recs  ***************  4/19-no new events   38 YO M with PMHx of morbid obesity, BEA on CPAP, pAFIB (not on AC), HTN, and BL papilledema attributed to pseudotumor cerebri who presented initially to [facility] on 2/24 with SOB and BL LE edema. Found with URI outpatient with progressive SOB, and concern for noted for MFPNA on imaging. Course progressed with AHRF with worsening O2 demand, respiratory distress, secretions, and somnolence requiring intubation (difficult with success after 6 attempts) in ED and admission to [facility] MICU. While in MICU, patient noted with increasing O2 demand with no improvement despite optimal vent management. Concern noted for progressive ARDS, unable to prone given morbid obesity, and ultimately cannulated for vvECMO on 2/25 and transferred to [facility] for further management on 2/26. While at [facility], tx with diuresis and ABX, and ultimately decannulated and s/p 60XLTCP trach and PEG on 3/7. Patient ultimately transferred to [facility] on 3/11 and course complicated by recurrent high grade fevers and found with fungemia.    NEUROLOGY  # Hx of Pseudotumor Cerebri   - s/p LP in 2024 with high opening pressure  - s/p MRI 2024 with possible pituitary mass but unclear because of pressure effect from pseudotumor cerebri causing partially empty sella.  - Prolactin elevated   - ACTH low but received steroids during admission   - FT4 low normal and TSH normal, but given FT4 low normal TSH should be higher   - Discuss endocrine consult for inhouse work up vs outpatient.     # Sedation   - Weaned off sedation in ICU   - Nimbex turned off 2/27   - Prop turned off 3/9   - Precedex turned off and catapres started 3/11  - catapres 0.2 TID (decreased 3/25) - tapered down 3/31 to 0.1 tid - discontinued 4/3     # Insomnia   - Patient worked night shift x 15 years   - Trouble sleeping at night leading to day time sleepiness and poor participation with PT  - Continue on Seroquel 25 QHS (increased 3/18)   - Continue on melatonin   - Continue on Neurontin as below    # BL LE neuropathy   - CIPN concern   - Neurontin increased to 300mg Q8H with relief   - Pain mgt called to consult - recs for tylenol ATC, NEurontin increased to 400mg q6, Oxycodone 5 q4h prn , Dilaudid for BREAKTHROUGH pain only, Lidocaine patches on each leg, ice prn to feet  - Some improvement in pain - PT consult for TENS,   - oxycodone inc to 10mg as pt with no relief /buttocks area indurated /tender CT ordered -no abscess  - C/w Gabapentin : 600/600/800 and up titrate as required  - Switched to PO Dilaudid 2mg Q6H PRN moderate-severe and IV Dilaudid PRN severe breakthrough and with dressing changes   - Still with severe pain 10/10 despite change to Dilaudid will increase to 4mg q4h prn with iv Dilaudid breakthrough  - Neurontin increased to 800mg q8h Monitor neuro status/ lethargy   - I have reviewed with pt to not wait to long for dose of pain medication to  prevent pain from escalating   - Meds reviewed despite being in constant pain pt not taking Dilaudid as often as he can often with 10/10 pain Dilaudid will be every 4 hours with parameters and pt can refuse Will reconsult pain mgt again for any recommendations on Monday   - Pain management reconsulted , recs to stop Dilaudid PO ATC , continue Dilaudid for breakthrough pain with dressing changes, increase gabapentin to 900 mg Q8, increased Cymbalta to 60 mg QD and Flexeril 5mg Q8  - Neurontin inc to 900 with some decrease in pain but still severe   - Will do trial of capsaicin bid with strict instructions to avoid open /broken irritated skin/ and bandaged areas     CARDIOVASCULAR  # HTN   - HTN noted in ICU requiring cardene and esmolol GTTs   - Lisinopril dc'ed to increase catapres   - Continue on norvasc 10, catapres weaned off 4/3  - Monitor BP     # AFIB   - Hx of pAFIB   - No RVR episodes or pAFIB episodes in ICU   - No rate control medications needed  - Monitor on telemetry     # RV dilation second to PHTN   - POCUS on arrival to [facility] with RVE concern   - TTE 10/2024 with EF 55-60 with normal LVSF, moderate LVH and normal RVSF and size with no concern for pHTN   - TTE on 2/25 at  with EF 61 and normal LVSF, but enlarged RV size with reduced RVSF, mild TR, mild PHTN with PASP 49, and dilated IVC to 3.4cn, but normal TAPSE 2.1.  - Continue on aggressive diuresis in ICU    - TTE 3/11 with RVSF reduced with TAPSE 1.6. IVC improved to 2.12cm.   - Lasix 40 PO QD discontinued 4/7 secondary to ELLA  - Monitor IO/ BMP    RESPIRATORY  # ARDS second to MFPNA   - Hx of BEA on CPAP presented to  post URI with SOB and found with AHRF with progression to ARDS second to post viral MFPNA   - Intubated 2/25   - Cannulated for vvECMO 2/26   - s/p 60XLTCP tracheostomy 3/7   - Decannulated 3/7   - CTSx removed tracheostomy and ECMO sutures on 3/17   - Continue on TC QD with PMV as able with strong phonation  - Continue on nebs and HTS   -  trials continue and now trialing overnight with BIPAP for OHS (10/8/12)  - Continue on PS 18/10/40 QHS given OHS   - Decannulated 3/28   - c/w nasal cannula 2L     GI  # Dysphagia   - s/p PEG 3/7   - Attempted green dye on 3/13 and failed  - Continue on TF  - Continue on miralax and senna  - RPT green dye passed 3/19  - 3/20 Passed FEEST - on soft bite sized with thin liquids Added consistent carb 2/2 weight/Elevated A1c   - PEG removed in OR DSD over site 4/15     # Mild transaminitis   - LFTs elevated in ICU with TBILI elevated likely from ECMO hemolysis   - US ABD with hepatic steatosis   - Trend LFTs      RENAL  # ELLA   - ELLA likely from cardiorenal with RVE   - Diuresed in ICU and improved   - Monitor renal function and UOP   - Recurrent ELLA noted suspected in s/o vancomycin toxicity (4/7)   - UA negative and Urine Studies FeNa 0.9 % suggest pre-renal etiology  - Lasix discontinued for now  - s/p IV fluid trial on 4/8 with minimal improvement  - Trialing additional fluids on 4/10   - Trend BMP and I/Os    # Hypernatremia (resolved)  - Hypernatremia with fever spikes   - Fever curve improved and attempting to wean FWF   - now off FWF    INFECTIOUS DISEASE  # Recurrent fevers with fungemia from unknown source   - Recurrent fevers post ECMO decannulation thought initially to be cytokine surge   - BCx, SCx, UCx, RVP and MRSA RPT on 3/12 negative   - Completed zosyn (3/12-3/18) empirically with improved WBC , however recurrent fevers noted.   - RPT SCx, UA, CXR and RVP negative  - TTE 3/17 with no IE  - PanCT 3/17 with PNA and persistent panniculitis  - BCx 3/15 and 3/16 with candida albicans, rpt BCx 3/18 and 3/20 negative   - Concern for possible source from panniculitis   - Continue on Caspofungin 70 mg daily 3/17 - 4/15, ID consult appreciated   - 3/30 spiked temp cx's sent + ua and c/s pending Bcx neg to date - started on vanco /zosyn ID following  - Vanco stopped (4/7) due to ELLA concern for Vanco toxicty and continued on empiric Zosyn/ Caspofungin   - 4/3 - persistent fevers, leukocytosis. Will repeat CT C/A/P to evaluate for source - no source found - Bilat lung opacities mildly decreased     - 4/8 bone scan: : "1. Large area of soft tissue infection in the R side of the pelvis as detailed above. Slightly more intense activity in the R iliac bone compared to the L raises possibility of OM. 2. Intense activity in the lateral aspect of the R forefoot compatible with soft tissue infection; complicating osteomyelitis is not excluded. Milder activity in the medial aspect of the L forefoot may reflect soft tissue infection."  - ID consult appreciated. Continue on Zosyn 3.375 g 8, plan on 6 week course for possible OM until 5/11 and s/p Caspofungin 70 mg daily, which ended 4/15  - MR Pelvis Bony (4/11): No osteomyelitis. Sacral decubitus wound is again seen extending superiorly along the superficial margin of the right gluteus zurdo to the level of L5 with small amount of fluid and gas within the tract. The underlying gluteus zurdo demonstrates a region of decreased enhancement consistent with ischemia or necrosis measuring up to 11.9 cm transversely. Muscle edema suggests a nonspecific myositis. Small bilateral hip joint effusions with mild synovitis is nonspecific.  - S/P sacral wound debridement with cultures growing Providencia and Bacteroides (4/15)  - Caspofungin finished 4/15    # MFPNA and abdominal pannus cellulitis   - Presented to HH post URI with SOB and found with AHRF with progression to ARDS second to post viral MFPNA   - RVP, legionella, strept, BAL, BCx, UCx, HIV, PCP, and adenovirus PCR negative   - Initially started on multiple agents in ICU and ultimately completed zosyn (2/26-3/9) ZMAX (2/25-2/26) and vanco (2/26-3/1)     # R/o Sacral OM  - Noted with hard indurated sacral wound by Wound Care Team  - CT Pelvis (3/29) revealing superficial skin thickening overlying the sacrum with underlying edema of the subcutaneous fat, in keeping with the clinical history of sacral wound. No evidence of underlying tract or drainable fluid collection.  - CT 4/3 - no fluid collection in sacral tissue   - Given ongoing fever with no clear source with obtain MRI Pelvis per ID recs to r/o sacral OM once renal fx improves  - S/P sacral wound debridement with cultures growing Providencia and Bacteroides (4/15)    # Penile discharge   - GC/ chlamydia negative   - HIV negative   - Monitor for now    # PPX   - MRSA PCR positive and completed bactroban (2/26-3/3)     HEME   # Anemia likely second to ECMO circuit vs AOCD   - HH low in ICU second to ECMO circuit   - HH dropping again today however no bleeding noted   - Microcytic and hemochromic, sent anemia panel 3/19  - Trend HH     VASCULAR   # R IJ and BL LE DVTs   - Post ECMO dopplers on 3/9 negative for BL UE/ LE DVTs.   - Subsequent post ECMO dopplers performed on 3/14 and noted with acute, non-occlusive deep vein thrombosis noted within the right internal jugular vein. Acute, occlusive deep vein thromboses noted within the right and left peroneal veins at both mid calves, and age-indeterminate, occlusive deep vein thrombosis visualized within the left gastrocnemius vein at the proximal calf.     - Continue on argatroban GTT and changed to Eliquis   - S/P heparin gtt prior to PEG removal now transitioned back to Eliquis (4/17)    PODIATRY   # BL plantar feet blisters likely pressors induced  - Seen by podiatry and blisters lanced on 3/4 and again on 3/18  - Continue on local wound care per podiatry   - Podiatry following  - OK for WBAT will fu with PT for offloading shoe if appropriate  - Pain mgt consult   - Podiatry FU 3/28 / 4/3 done /following     ENDOCRINE  # Pre-DM2   - A1C 6.7  - Continue on ISS   - Monitor FS   - IF no demand then stop ISS     LINES/ TUBES  - R IJ and R FEM ECMO (2/26-3/7)     SKIN  # Pannus canndial intertrigo   # Sacrum/ buttock MAD  - Continue on clotrimazole cream   - WOC appreciated   -Seen by WC pt noted to have oozing from Buttock wound surgicel placed by wound care On follow up no further bleeding  - Wound care placed orders   - No bleeding from wound     ETHICS/ GOC    - FULL CODE     DISPO - PT following  Seen by PMR for acute rehab per recs  ***************  4/19-no new events  4/20-no new issues   38 YO M with PMHx of morbid obesity, BEA on CPAP, pAFIB (not on AC), HTN, and BL papilledema attributed to pseudotumor cerebri who presented initially to [facility] on 2/24 with SOB and BL LE edema. Found with URI outpatient with progressive SOB, and concern for noted for MFPNA on imaging. Course progressed with AHRF with worsening O2 demand, respiratory distress, secretions, and somnolence requiring intubation (difficult with success after 6 attempts) in ED and admission to [facility] MICU. While in MICU, patient noted with increasing O2 demand with no improvement despite optimal vent management. Concern noted for progressive ARDS, unable to prone given morbid obesity, and ultimately cannulated for vvECMO on 2/25 and transferred to [facility] for further management on 2/26. While at [facility], tx with diuresis and ABX, and ultimately decannulated and s/p 60XLTCP trach and PEG on 3/7. Patient ultimately transferred to RCU on 3/11 and course complicated by recurrent high grade fevers and found with fungemia.    NEUROLOGY  # Hx of Pseudotumor Cerebri   - s/p LP in 2024 with high opening pressure  - s/p MRI 2024 with possible pituitary mass but unclear because of pressure effect from pseudotumor cerebri causing partially empty sella.  - Prolactin elevated   - ACTH low but received steroids during admission   - FT4 low normal and TSH normal, but given FT4 low normal TSH should be higher   - Discuss Endocrine consult for in house work up vs outpatient.     # Sedation   - Weaned off sedation in ICU   - Nimbex turned off 2/27, Prop turned off 3/9   - Precedex turned off and catapres started 3/11  - catapres 0.2 TID (decreased 3/25) - tapered down 3/31 to 0.1 tid - discontinued 4/3     # Insomnia   - Patient worked night shift x 15 years   - Trouble sleeping at night leading to day time sleepiness and poor participation with PT  - Continue on Seroquel 25 QHS (increased 3/18) & Melatonin    - Continue on Neurontin as below    # BL LE neuropathy   - CIPN concern   - Pain mgt called to consult - recs for Tylenol ATC, Neurontin, Oxycodone, Dilaudid for BREAKTHROUGH pain only, Lidocaine patches on each leg, ice prn to feet  - Some improvement in pain - PT consult for TENS,   - pt with significant pain at buttocks area indurated /tender CT ordered -no abscess  - Gabapentin up titrated to 900mg po TID, switched Dilaudid po to Oxycodone 10mg po Q6H PRN moderate-severe pain and IV Dilaudid PRN severe breakthrough and with dressing changes   - Neurontin increased to 800mg q8h Monitor neuro status/ lethargy   - I have reviewed with pt to not wait to long for dose of pain medication to  prevent pain from escalating   - Pain management reconsulted, recs to stop Dilaudid PO ATC , continue Dilaudid IV for breakthrough pain with dressing changes, increase Gabapentin to 900 mg Q8, increased Cymbalta to 60 mg QD and Flexeril 5mg Q8  - Will do trial of capsaicin bid with strict instructions to avoid open /broken irritated skin/ and bandaged areas     CARDIOVASCULAR  # HTN   - HTN noted in ICU requiring cardene and esmolol GTTs   - Lisinopril dc'ed to increase Catapres   - Continue on Norvasc 10, catapres weaned off 4/3  - Monitor BP     # AFIB   - Hx of pAFIB   - No RVR episodes or pAFIB episodes in ICU   - No rate control medications needed  - Monitor on telemetry     # RV dilation second to PHTN   - POCUS on arrival to [facility] with RVE concern   - TTE 10/2024 with EF 55-60 with normal LVSF, moderate LVH and normal RVSF and size with no concern for pHTN   - TTE on 2/25 at  with EF 61 and normal LVSF, but enlarged RV size with reduced RVSF, mild TR, mild PHTN with PASP 49, and dilated IVC to 3.4cn, but normal TAPSE 2.1.  - Continue on aggressive diuresis in ICU    - TTE 3/11 with RVSF reduced with TAPSE 1.6. IVC improved to 2.12cm.   - Lasix 40 PO QD discontinued 4/7 secondary to ELLA  - Monitor IO/ BMP    RESPIRATORY  # ARDS second to MFPNA   - Hx of BEA on CPAP presented to  post URI with SOB and found with AHRF with progression to ARDS second to post viral MFPNA   - Intubated 2/25   - Cannulated for vvECMO 2/26   - s/p 60XLTCP tracheostomy 3/7   - Decannulated 3/7   - CTSx removed tracheostomy and ECMO sutures on 3/17   - Continue on TC QD with PMV as able with strong phonation  - Continue on nebs and HTS   -  trials continue and now trialing overnight with BIPAP for OHS (10/8/12)  - Continue on PS 18/10/40 QHS given OHS   - Decannulated 3/28   - c/w nasal cannula 4L     GI  # Dysphagia   - s/p PEG 3/7   - Attempted green dye on 3/13 and failed  - Continue on TF  - Continue on Miralax and Senna  - RPT green dye passed 3/19  - 3/20 Passed FEEST - on soft bite sized with thin liquids Added consistent carb 2/2 weight/Elevated A1c   - PEG removed in OR DSD over site 4/15     # Mild transaminitis   - LFTs elevated in ICU with TBILI elevated likely from ECMO hemolysis   - US ABD with hepatic steatosis   - Trend LFTs      RENAL  # ELLA   - ELLA likely from cardiorenal with RVE   - Diuresed in ICU and improved   - Monitor renal function and UOP   - Recurrent ELLA noted suspected in s/o vancomycin toxicity (4/7)   - UA negative and Urine Studies FeNa 0.9 % suggest pre-renal etiology  - Lasix discontinued for now  - s/p IV fluid trial on 4/8 with minimal improvement  - Trialing additional fluids on 4/10   - Trend BMP and I/Os    # Hypernatremia (resolved)  - Hypernatremia with fever spikes   - Fever curve improved and attempting to wean FWF   - now off FWF    INFECTIOUS DISEASE  # Recurrent fevers with fungemia from unknown source   - Recurrent fevers post ECMO decannulation thought initially to be cytokine surge   - BCx, SCx, UCx, RVP and MRSA RPT on 3/12 negative   - Completed zosyn (3/12-3/18) empirically with improved WBC , however recurrent fevers noted.   - RPT SCx, UA, CXR and RVP negative  - TTE 3/17 with no IE  - PanCT 3/17 with PNA and persistent panniculitis  - BCx 3/15 and 3/16 with candida albicans, rpt BCx 3/18 and 3/20 negative   - Concern for possible source from panniculitis   - Continue on Caspofungin 70 mg daily 3/17 - 4/15, ID consult appreciated   - 3/30 spiked temp cx's sent + ua and c/s pending Bcx neg to date - started on vanco /zosyn ID following  - Vanco stopped (4/7) due to ELLA concern for Vanco toxicty and continued on empiric Zosyn/ Caspofungin   - 4/3 - persistent fevers, leukocytosis. Will repeat CT C/A/P to evaluate for source - no source found - Bilat lung opacities mildly decreased     - 4/8 bone scan: : "1. Large area of soft tissue infection in the R side of the pelvis as detailed above. Slightly more intense activity in the R iliac bone compared to the L raises possibility of OM. 2. Intense activity in the lateral aspect of the R forefoot compatible with soft tissue infection; complicating osteomyelitis is not excluded. Milder activity in the medial aspect of the L forefoot may reflect soft tissue infection."  - ID consulted recs to continue on Zosyn 3.375 g 8, plan on 6 week course for possible OM until 5/11 and s/p Caspofungin 70 mg daily, which ended 4/15  - MR Pelvis Bony (4/11): No osteomyelitis. Sacral decubitus wound is again seen extending superiorly along the superficial margin of the right gluteus zurdo to the level of L5 with small amount of fluid and gas within the tract. The underlying gluteus zurdo demonstrates a region of decreased enhancement consistent with ischemia or necrosis measuring up to 11.9 cm transversely. Muscle edema suggests a nonspecific myositis. Small bilateral hip joint effusions with mild synovitis is nonspecific.  - S/P sacral wound debridement with cultures growing Providencia and Bacteroides (4/15)  - Caspofungin finished 4/15    # MFPNA and abdominal pannus cellulitis   - Presented to  post URI with SOB and found with AHRF with progression to ARDS second to post viral MFPNA   - RVP, legionella, strept, BAL, BCx, UCx, HIV, PCP, and adenovirus PCR negative   - Initially started on multiple agents in ICU and ultimately completed zosyn (2/26-3/9) ZMAX (2/25-2/26) and vanco (2/26-3/1)     # R/o Sacral OM  - Noted with hard indurated sacral wound by Wound Care Team  - CT Pelvis (3/29) revealing superficial skin thickening overlying the sacrum with underlying edema of the subcutaneous fat, in keeping with the clinical history of sacral wound. No evidence of underlying tract or drainable fluid collection.  - CT 4/3 - no fluid collection in sacral tissue   - Given ongoing fever with no clear source with obtain MRI Pelvis per ID recs to r/o sacral OM once renal fx improves  - S/P sacral wound debridement with cultures growing Providencia and Bacteroides (4/15)    # Penile discharge   - GC/ chlamydia negative, HIV negative   - Monitor for now    # PPX   - MRSA PCR positive and completed bactroban (2/26-3/3)     HEME   # Anemia likely second to ECMO circuit vs AOCD   - HH low in ICU second to ECMO circuit   - HH dropping again today however no bleeding noted   - Microcytic and hemochromic, sent anemia panel 3/19  - Trend H/H     VASCULAR   # R IJ and BL LE DVTs   - Post ECMO dopplers on 3/9 negative for BL UE/ LE DVTs.   - Subsequent post ECMO dopplers performed on 3/14 and noted with acute, non-occlusive deep vein thrombosis noted within the right internal jugular vein. Acute, occlusive deep vein thromboses noted within the right and left peroneal veins at both mid calves, and age-indeterminate, occlusive deep vein thrombosis visualized within the left gastrocnemius vein at the proximal calf.     - Continue on argatroban GTT and changed to Eliquis   - S/P heparin gtt prior to PEG removal now transitioned back to Eliquis (4/17)    PODIATRY   # BL plantar feet blisters likely pressors induced  - Seen by podiatry and blisters lanced on 3/4 and again on 3/18  - Continue on local wound care per podiatry   - Podiatry following  - OK for WBAT will fu with PT for offloading shoe if appropriate  - Podiatry F/U 3/28 / 4/3 done /following     ENDOCRINE  # Pre-DM2   - A1C 6.7%  - Continue on ISS, Monitor FS   - IF no demand then stop ISS     LINES/ TUBES  - R IJ and R FEM ECMO (2/26-3/7)     SKIN  # Pannus canndial intertrigo   # Sacrum/ buttock MAD  - Continue on clotrimazole cream   - WOC appreciated   - Seen by WC pt noted to have oozing from Buttock wound surgicel placed by wound care On follow up no further bleeding  - Wound care placed orders   - No bleeding from wound     ETHICS/ GOC    - FULL CODE     DISPO - PT following  Seen by PMR for acute rehab per recs  ***************  4/19-no new events  4/20-no new issues

## 2025-04-21 LAB
ANION GAP SERPL CALC-SCNC: 13 MMOL/L — SIGNIFICANT CHANGE UP (ref 7–14)
BUN SERPL-MCNC: 12 MG/DL — SIGNIFICANT CHANGE UP (ref 7–23)
CALCIUM SERPL-MCNC: 9.9 MG/DL — SIGNIFICANT CHANGE UP (ref 8.4–10.5)
CHLORIDE SERPL-SCNC: 98 MMOL/L — SIGNIFICANT CHANGE UP (ref 98–107)
CO2 SERPL-SCNC: 28 MMOL/L — SIGNIFICANT CHANGE UP (ref 22–31)
CREAT SERPL-MCNC: 1.37 MG/DL — HIGH (ref 0.5–1.3)
EGFR: 68 ML/MIN/1.73M2 — SIGNIFICANT CHANGE UP
EGFR: 68 ML/MIN/1.73M2 — SIGNIFICANT CHANGE UP
GLUCOSE BLDC GLUCOMTR-MCNC: 106 MG/DL — HIGH (ref 70–99)
GLUCOSE BLDC GLUCOMTR-MCNC: 114 MG/DL — HIGH (ref 70–99)
GLUCOSE BLDC GLUCOMTR-MCNC: 115 MG/DL — HIGH (ref 70–99)
GLUCOSE BLDC GLUCOMTR-MCNC: 99 MG/DL — SIGNIFICANT CHANGE UP (ref 70–99)
GLUCOSE SERPL-MCNC: 83 MG/DL — SIGNIFICANT CHANGE UP (ref 70–99)
HCT VFR BLD CALC: 27 % — LOW (ref 39–50)
HGB BLD-MCNC: 8.5 G/DL — LOW (ref 13–17)
MAGNESIUM SERPL-MCNC: 1.8 MG/DL — SIGNIFICANT CHANGE UP (ref 1.6–2.6)
MCHC RBC-ENTMCNC: 28.5 PG — SIGNIFICANT CHANGE UP (ref 27–34)
MCHC RBC-ENTMCNC: 31.5 G/DL — LOW (ref 32–36)
MCV RBC AUTO: 90.6 FL — SIGNIFICANT CHANGE UP (ref 80–100)
NRBC # BLD AUTO: 0 K/UL — SIGNIFICANT CHANGE UP (ref 0–0)
NRBC # FLD: 0 K/UL — SIGNIFICANT CHANGE UP (ref 0–0)
NRBC BLD AUTO-RTO: 0 /100 WBCS — SIGNIFICANT CHANGE UP (ref 0–0)
PHOSPHATE SERPL-MCNC: 4.9 MG/DL — HIGH (ref 2.5–4.5)
PLATELET # BLD AUTO: 363 K/UL — SIGNIFICANT CHANGE UP (ref 150–400)
POTASSIUM SERPL-MCNC: 3.7 MMOL/L — SIGNIFICANT CHANGE UP (ref 3.5–5.3)
POTASSIUM SERPL-SCNC: 3.7 MMOL/L — SIGNIFICANT CHANGE UP (ref 3.5–5.3)
RBC # BLD: 2.98 M/UL — LOW (ref 4.2–5.8)
RBC # FLD: 23.2 % — HIGH (ref 10.3–14.5)
SODIUM SERPL-SCNC: 139 MMOL/L — SIGNIFICANT CHANGE UP (ref 135–145)
WBC # BLD: 7.99 K/UL — SIGNIFICANT CHANGE UP (ref 3.8–10.5)
WBC # FLD AUTO: 7.99 K/UL — SIGNIFICANT CHANGE UP (ref 3.8–10.5)

## 2025-04-21 PROCEDURE — 99232 SBSQ HOSP IP/OBS MODERATE 35: CPT

## 2025-04-21 PROCEDURE — G0545: CPT

## 2025-04-21 PROCEDURE — 99233 SBSQ HOSP IP/OBS HIGH 50: CPT

## 2025-04-21 RX ORDER — OXYCODONE HYDROCHLORIDE 30 MG/1
15 TABLET ORAL EVERY 6 HOURS
Refills: 0 | Status: DISCONTINUED | OUTPATIENT
Start: 2025-04-21 | End: 2025-04-24

## 2025-04-21 RX ORDER — OXYCODONE HYDROCHLORIDE 30 MG/1
5 TABLET ORAL
Refills: 0 | Status: DISCONTINUED | OUTPATIENT
Start: 2025-04-21 | End: 2025-04-24

## 2025-04-21 RX ORDER — BISACODYL 5 MG
10 TABLET, DELAYED RELEASE (ENTERIC COATED) ORAL AT BEDTIME
Refills: 0 | Status: DISCONTINUED | OUTPATIENT
Start: 2025-04-21 | End: 2025-04-25

## 2025-04-21 RX ORDER — SALINE 7; 19 G/118ML; G/118ML
1 ENEMA RECTAL ONCE
Refills: 0 | Status: COMPLETED | OUTPATIENT
Start: 2025-04-21 | End: 2025-04-21

## 2025-04-21 RX ADMIN — SEVELAMER HYDROCHLORIDE 800 MILLIGRAM(S): 800 TABLET ORAL at 11:34

## 2025-04-21 RX ADMIN — DULOXETINE 60 MILLIGRAM(S): 20 CAPSULE, DELAYED RELEASE ORAL at 11:35

## 2025-04-21 RX ADMIN — LIDOCAINE HYDROCHLORIDE 1 PATCH: 20 JELLY TOPICAL at 23:53

## 2025-04-21 RX ADMIN — SEVELAMER HYDROCHLORIDE 800 MILLIGRAM(S): 800 TABLET ORAL at 08:44

## 2025-04-21 RX ADMIN — CLOTRIMAZOLE 1 APPLICATION(S): 1 CREAM TOPICAL at 18:17

## 2025-04-21 RX ADMIN — Medication 1 APPLICATION(S): at 13:35

## 2025-04-21 RX ADMIN — Medication 1 APPLICATION(S): at 06:22

## 2025-04-21 RX ADMIN — APIXABAN 5 MILLIGRAM(S): 2.5 TABLET, FILM COATED ORAL at 18:16

## 2025-04-21 RX ADMIN — Medication 1 TABLET(S): at 11:35

## 2025-04-21 RX ADMIN — Medication 0.5 MILLIGRAM(S): at 06:49

## 2025-04-21 RX ADMIN — AMLODIPINE BESYLATE 10 MILLIGRAM(S): 10 TABLET ORAL at 06:19

## 2025-04-21 RX ADMIN — OXYCODONE HYDROCHLORIDE 10 MILLIGRAM(S): 30 TABLET ORAL at 08:44

## 2025-04-21 RX ADMIN — GABAPENTIN 900 MILLIGRAM(S): 400 CAPSULE ORAL at 06:18

## 2025-04-21 RX ADMIN — Medication 40 MILLIGRAM(S): at 06:18

## 2025-04-21 RX ADMIN — LIDOCAINE HYDROCHLORIDE 1 PATCH: 20 JELLY TOPICAL at 11:33

## 2025-04-21 RX ADMIN — LIDOCAINE HYDROCHLORIDE 1 PATCH: 20 JELLY TOPICAL at 19:52

## 2025-04-21 RX ADMIN — Medication 1 APPLICATION(S): at 06:24

## 2025-04-21 RX ADMIN — GABAPENTIN 900 MILLIGRAM(S): 400 CAPSULE ORAL at 21:08

## 2025-04-21 RX ADMIN — Medication 10 MILLIGRAM(S): at 21:08

## 2025-04-21 RX ADMIN — MUPIROCIN CALCIUM 1 APPLICATION(S): 20 CREAM TOPICAL at 06:23

## 2025-04-21 RX ADMIN — Medication 1 APPLICATION(S): at 18:19

## 2025-04-21 RX ADMIN — OXYCODONE HYDROCHLORIDE 10 MILLIGRAM(S): 30 TABLET ORAL at 09:44

## 2025-04-21 RX ADMIN — Medication 25 GRAM(S): at 13:29

## 2025-04-21 RX ADMIN — QUETIAPINE FUMARATE 25 MILLIGRAM(S): 25 TABLET ORAL at 21:07

## 2025-04-21 RX ADMIN — Medication 3 MILLIGRAM(S): at 21:07

## 2025-04-21 RX ADMIN — SODIUM HYPOCHLORITE 1 APPLICATION(S): 0.12 SOLUTION TOPICAL at 06:23

## 2025-04-21 RX ADMIN — POLYETHYLENE GLYCOL 3350 17 GRAM(S): 17 POWDER, FOR SOLUTION ORAL at 11:33

## 2025-04-21 RX ADMIN — OXYCODONE HYDROCHLORIDE 15 MILLIGRAM(S): 30 TABLET ORAL at 15:44

## 2025-04-21 RX ADMIN — SODIUM HYPOCHLORITE 1 APPLICATION(S): 0.12 SOLUTION TOPICAL at 18:18

## 2025-04-21 RX ADMIN — GABAPENTIN 900 MILLIGRAM(S): 400 CAPSULE ORAL at 13:28

## 2025-04-21 RX ADMIN — LIDOCAINE HYDROCHLORIDE 1 PATCH: 20 JELLY TOPICAL at 19:51

## 2025-04-21 RX ADMIN — Medication 1 APPLICATION(S): at 23:57

## 2025-04-21 RX ADMIN — Medication 25 GRAM(S): at 21:09

## 2025-04-21 RX ADMIN — APIXABAN 5 MILLIGRAM(S): 2.5 TABLET, FILM COATED ORAL at 06:19

## 2025-04-21 RX ADMIN — SEVELAMER HYDROCHLORIDE 800 MILLIGRAM(S): 800 TABLET ORAL at 18:15

## 2025-04-21 RX ADMIN — OXYCODONE HYDROCHLORIDE 15 MILLIGRAM(S): 30 TABLET ORAL at 21:08

## 2025-04-21 RX ADMIN — Medication 25 GRAM(S): at 06:20

## 2025-04-21 RX ADMIN — OXYCODONE HYDROCHLORIDE 15 MILLIGRAM(S): 30 TABLET ORAL at 14:44

## 2025-04-21 RX ADMIN — CLOTRIMAZOLE 1 APPLICATION(S): 1 CREAM TOPICAL at 06:24

## 2025-04-21 RX ADMIN — MUPIROCIN CALCIUM 1 APPLICATION(S): 20 CREAM TOPICAL at 18:17

## 2025-04-21 NOTE — PROGRESS NOTE ADULT - SUBJECTIVE AND OBJECTIVE BOX
[facility]-- WOUND TEAM -- FOLLOW UP NOTE  --------------------------------------------------------------------------------    subjective: Patient seen and examined with patient's father at bedside. Per patient he overall feels good, denies pain and/or tenderness to sacral/buttock wound. Reports consistent pain to bilateral feet. Reports good appetite.     Interval HPI/24 hour events:   -Podiatry continues to follow    Chart reviewed including labs and relevant images      Diet:  Diet, Regular:   Consistent Carbohydrate Evening Snack (CSTCHOSN)  No Concentrated Phosphorus  Supplement Feeding Modality:  Oral  Ensure Max Cans or Servings Per Day:  1       Frequency:  Two Times a day (04-18-25 @ 10:29)      ROS: General, skin see above.  All other systems negative.    ALLERGIES & MEDICATIONS  --------------------------------------------------------------------------------  Allergies    No Known Allergies    Intolerances          STANDING INPATIENT MEDICATIONS    amLODIPine   Tablet 10 milliGRAM(s) Oral daily  apixaban 5 milliGRAM(s) Oral every 12 hours  bisacodyl 10 milliGRAM(s) Oral at bedtime  capsaicin HP 0.075% Cream 1 Application(s) Topical four times a day  chlorhexidine 2% Cloths 1 Application(s) Topical <User Schedule>  clotrimazole 1% Cream 1 Application(s) Topical two times a day  Dakins Solution - 1/4 Strength 1 Application(s) Topical two times a day  dextrose 50% Injectable 25 Gram(s) IV Push once  dextrose 50% Injectable 12.5 Gram(s) IV Push once  dextrose 50% Injectable 25 Gram(s) IV Push once  DULoxetine 60 milliGRAM(s) Oral daily  gabapentin 900 milliGRAM(s) Oral every 8 hours  glucagon  Injectable 1 milliGRAM(s) IntraMuscular once  insulin lispro (ADMELOG) corrective regimen sliding scale   SubCutaneous three times a day before meals  insulin lispro (ADMELOG) corrective regimen sliding scale   SubCutaneous at bedtime  lidocaine   4% Patch 1 Patch Transdermal daily  lidocaine   4% Patch 1 Patch Transdermal daily  melatonin 3 milliGRAM(s) Oral at bedtime  multivitamin 1 Tablet(s) Oral daily  mupirocin 2% Ointment 1 Application(s) Topical two times a day  pantoprazole    Tablet 40 milliGRAM(s) Oral before breakfast  piperacillin/tazobactam IVPB.. 3.375 Gram(s) IV Intermittent every 8 hours  polyethylene glycol 3350 17 Gram(s) Oral daily  QUEtiapine 25 milliGRAM(s) Oral at bedtime  saline laxative (FLEET) Rectal Enema 1 Enema Rectal once  sevelamer carbonate 800 milliGRAM(s) Oral three times a day with meals      PRN INPATIENT MEDICATION  acetaminophen     Tablet .. 650 milliGRAM(s) Oral every 6 hours PRN  albuterol/ipratropium for Nebulization 3 milliLiter(s) Nebulizer every 6 hours PRN  cyclobenzaprine 5 milliGRAM(s) Oral three times a day PRN  dextrose Oral Gel 15 Gram(s) Oral once PRN  oxyCODONE    IR 5 milliGRAM(s) Oral two times a day PRN  oxyCODONE    IR 15 milliGRAM(s) Oral every 6 hours PRN        Vital signs:  T(C): 36.5 (04-21-25 @ 11:35), Max: 36.5 (04-21-25 @ 11:35)  HR: 120 (04-21-25 @ 11:35) (103 - 120)  BP: 142/91 (04-21-25 @ 11:35) (135/86 - 150/98)  RR: 18 (04-21-25 @ 11:35) (18 - 18)  SpO2: 96% (04-21-25 @ 11:35) (92% - 100%)  Wt(kg): 197.1 kg (04-)        04-20-25 @ 07:01  -  04-21-25 @ 07:00  --------------------------------------------------------  IN: 0 mL / OUT: 1700 mL / NET: -1700 mL      Constitutional: NAD, A&Ox3. Morbidly Obese. Patient able to assist with turning  (+) bariatric low airloss support surface, (+) fluidized positioning devices, bariatric seat cushion on chair, heels slightly offloaded with pillow  HEENT: NC/AT, non icteric, mucosa moist. Tracheostomy site with hypergranulation [id number], friable with cleansing- silicone foam with border applied.  Cardiovascular: tachycardic   Respiratory: nonlabored, room air, equal chest expansion  Gastrointestinal: soft NT/ND. Obese. No BM during exam.   LUQ surgical wound s/p PEG tube removal- 0.4cmx0.4cmx0.2cm (stable), tissue type exposing pink granular tissue, periwound skin with hyperpigmentation. No increased warmth, no erythema, no induration   Increase moisture beneath ABD pannus and bilateral groin- skin intact.  : incontinent condom catheter, seal intact.  Musculoskeletal: aROM to b/l UE, b/l le limited aROM, requires 1 person assistance with turning and positioning, no gross deformities or contractures.  Vascular: Deferred, to Podiatry, b/l feet dressings c/d/i.  Skin:  moist w/ good turgor.   Intertriginous folds of posterior trunk along bilateral flank risks for moisture, skin intact  Sacrum/gluteal cleft and bilateral buttocks Stage 4 complicated by moisture and incontinence (pt turned to rightt  side)  Sacral/gluteal cleft:  -8.5cmx2.9qye7me, circumferential undermining 6.5cm-8cm, tunnel from 12- 1o'clock extending at least 10cm (toward right iliac crest)  -Wound base with pink-moist granular base with scattered tan-moist adherent slough, friable with cleansing. Small area of hypergranulation at 12 o'clock of wound edge.  -Moderate serosanguineous drainage, no purulent drainage, no odor.   Right buttock shallow ulceration: [id number] (stable), satellite denuded epidermis distal to inferior wound edge 0.5cmx3.5cmx0.2cm.  Left buttock shallow ulceration: 0.8dzx9hqg6.2cm  Tissue type of satellite ulcerations 100% pink-moist dermis and re-epithelialization migrating from wound edges.  Periwound skin with no erythema, no edema, no induration, no fluctuance, no crepitus.      LABS/ CULTURES/ RADIOLOGY:              8.5    7.99  >-----------<  363      [04-21-25 @ 05:45]              27.0     139  |  98  |  12  ----------------------------<  83      [04-21-25 @ 05:45]  3.7   |  28  |  1.37        Ca     9.9     [04-21-25 @ 05:45]      Mg     1.80     [04-21-25 @ 05:45]      Phos  4.9     [04-21-25 @ 05:45]          CAPILLARY BLOOD GLUCOSE      POCT Blood Glucose.: 114 mg/dL (21 Apr 2025 11:27)  POCT Blood Glucose.: 99 mg/dL (21 Apr 2025 07:50)  POCT Blood Glucose.: 94 mg/dL (20 Apr 2025 22:14)  POCT Blood Glucose.: 104 mg/dL (20 Apr 2025 17:10)      A1C with Estimated Average Glucose Result: 6.7 % (02-25-25 @ 05:44)        Culture - Tissue with Gram Stain (04.15.25 @ 09:00)   Gram Stain:   Rare polymorphonuclear leukocytes per low power field   Rare Gram Negative Rods per oil power field  - Amoxicillin/Clavulanic Acid: R >16/8  - Ampicillin: R >16 These ampicillin results predict results for amoxicillin  - Ampicillin/Sulbactam: I 16/8  - Aztreonam: S <=4  - Cefazolin: R >16  - Cefepime: S <=2  - Cefoxitin: S <=8  - Ceftriaxone: S <=1  - Ciprofloxacin: R >2  - Ertapenem: S <=0.5  - Levofloxacin: R 2  - Meropenem: S <=1  - Piperacillin/Tazobactam: S <=8  - Trimethoprim/Sulfamethoxazole: S <=0.5/9.5  Specimen Source: Tissue SACRAL WOUND AEBNDEMENT  Culture Results:   Few Providencia stuartii   Rare Bacteroides vulgatus group "Susceptibilities not performed"  Organism Identification: Providencia stuartii  Organism: Providencia stuartii  Method Type: TRINIDAD      Culture - Acid Fast - Tissue w/Smear (04.15.25 @ 09:00)   Specimen Source: Tissue SACRAL WOUND AEBNDEMENT  Acid Fast Bacilli Smear:   No acid-fast bacilli seen by fluorochrome stain  Culture Results:   Culture is being performed.    Culture - Fungal, Tissue (04.15.25 @ 09:00)   Specimen Source: Tissue SACRAL WOUND AEBNDEMENT  Culture Results:   No growth

## 2025-04-21 NOTE — PROGRESS NOTE ADULT - SUBJECTIVE AND OBJECTIVE BOX
Patient is a 39y old  Male who presents with a chief complaint of sob (02 Mar 2025 06:19)       INTERVAL HPI/OVERNIGHT EVENTS:  Patient seen and evaluated at bedside.  Pt is resting comfortable in NAD. Denies N/V/F/C.  Pain rated at X/10    Allergies    No Known Allergies    Intolerances        Vital Signs Last 24 Hrs  T(C): 36.5 (21 Apr 2025 11:35), Max: 36.5 (21 Apr 2025 11:35)  T(F): 97.7 (21 Apr 2025 11:35), Max: 97.7 (21 Apr 2025 11:35)  HR: 120 (21 Apr 2025 11:35) (103 - 120)  BP: 142/91 (21 Apr 2025 11:35) (135/86 - 150/98)  BP(mean): 96 (21 Apr 2025 04:00) (96 - 112)  RR: 18 (21 Apr 2025 11:35) (18 - 18)  SpO2: 96% (21 Apr 2025 11:35) (92% - 100%)    Parameters below as of 21 Apr 2025 11:35  Patient On (Oxygen Delivery Method): room air        LABS:                        8.5    7.99  )-----------( 363      ( 21 Apr 2025 05:45 )             27.0     04-21    139  |  98  |  12  ----------------------------<  83  3.7   |  28  |  1.37[H]    Ca    9.9      21 Apr 2025 05:45  Phos  4.9     04-21  Mg     1.80     04-21        Urinalysis Basic - ( 21 Apr 2025 05:45 )    Color: x / Appearance: x / SG: x / pH: x  Gluc: 83 mg/dL / Ketone: x  / Bili: x / Urobili: x   Blood: x / Protein: x / Nitrite: x   Leuk Esterase: x / RBC: x / WBC x   Sq Epi: x / Non Sq Epi: x / Bacteria: x      CAPILLARY BLOOD GLUCOSE      POCT Blood Glucose.: 114 mg/dL (21 Apr 2025 11:27)  POCT Blood Glucose.: 99 mg/dL (21 Apr 2025 07:50)  POCT Blood Glucose.: 94 mg/dL (20 Apr 2025 22:14)  POCT Blood Glucose.: 104 mg/dL (20 Apr 2025 17:10)      Lower Extremity Physical Exam:  Vascular: DP 1/4 B/L, PT 0/4, B/L secondary to +3 pitting edema, CFT <3 seconds B/L, Temperature gradient warm to cool, B/L.   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 Right foot plantar lateral forefoot wound to subQ, no malodor, no pus, no acute signs of infection. Left foot digit 1,2,4 partial thickness dry gangrene. Left foot lateral plantar forefoot wound to subQ, no malodor, no pus, no acute signs of infection. improving in appearance L>R    RADIOLOGY & ADDITIONAL TESTS:

## 2025-04-21 NOTE — PROGRESS NOTE ADULT - NS ATTEND AMEND GEN_ALL_CORE FT
38 yo M w/ class III obesity, pAfib (no AC), HTN, BEA (on CPAP), history of b/l papilledema attributed to pseudotumor cerebri, who is transferred from [place] on 2/26 for VV ECMO 2/2 ARDS.     Patient transferred to [facility] for VV ECMO. S/P diiuresis, TTE/ROBERT with RV failure, s/p treatment of pneumonia. Patient s/p trach and peg. Course complicated by Fungemia s/p course of caspo, also with osteomyelitis s/p surgical debridement. Had critical illness polyneuropathy due to critical illness. With how neuropathic pain. Now decannulated trach and on PO diet.     # acute hypoxemic respiratory failure  # RV failure  # oropharyngeal dysphagia- resolved.   # Critical illness neuropathy  # afib  # DVT  # Osteomyelitis  # Fungemia  - ARDS s/p VV ecmo. Trach decannualted. On room air.   - OOB, ambulate as tolerated, aggressive PT  - Passes s/s eval, now on PO diet. Will keep peg in given recent placement. Will need outpatient f/u for possible removal.   - S/p course of antifungals. Pending continued zosyn for osteo s/p OR. C/W wound care.   - DVT noted on repeat duplex. C/W full ac   - C/W full a/c for now. Hx of afib, [id number] of 2  - Pain 2/2 to likely critical illness neuropathy. On gabapentin, cymbalta, flexiril. Pain consulted but given neuropathic less role for opioids  - C/W PO oxycodone, bowel regiment. WIll attempt to wean off IV Dilaudid for possible d/c planning.   - DVT ppx- Full A/C  - Dispo- full code. Aggressive PT eval. OOB to chair.

## 2025-04-21 NOTE — PROGRESS NOTE ADULT - ASSESSMENT
38 YO M with PMHx of morbid obesity, BEA on CPAP, pAFIB (not on AC), HTN, and BL papilledema attributed to pseudotumor cerebri who presented initially to [facility] on 2/24 with SOB and BL LE edema. Found with URI outpatient with progressive SOB, and concern for noted for MFPNA on imaging. Course progressed with AHRF with worsening O2 demand, respiratory distress, secretions, and somnolence requiring intubation (difficult with success after 6 attempts) in ED and admission to [facility] MICU. While in MICU, patient noted with increasing O2 demand with no improvement despite optimal vent management. Concern noted for progressive ARDS, unable to prone given morbid obesity, and ultimately cannulated for vvECMO on 2/25 and transferred to [facility] for further management on 2/26. While at [facility], tx with diuresis and ABX, and ultimately decannulated and s/p 60XLTCP trach and PEG on 3/7. Patient ultimately transferred to [facility] on 3/11 and course complicated by recurrent high grade fevers and found with fungemia.    NEUROLOGY  # Hx of Pseudotumor Cerebri   - s/p LP in 2024 with high opening pressure  - s/p MRI 2024 with possible pituitary mass but unclear because of pressure effect from pseudotumor cerebri causing partially empty sella.  - Prolactin elevated   - ACTH low but received steroids during admission   - FT4 low normal and TSH normal, but given FT4 low normal TSH should be higher   - Discuss Endocrine consult for in house work up vs outpatient.     # Sedation   - Weaned off sedation in ICU   - Nimbex turned off 2/27, Prop turned off 3/9   - Precedex turned off and catapres started 3/11  - catapres 0.2 TID (decreased 3/25) - tapered down 3/31 to 0.1 tid - discontinued 4/3     # Insomnia   - Patient worked night shift x 15 years   - Trouble sleeping at night leading to day time sleepiness and poor participation with PT  - Continue on Seroquel 25 QHS (increased 3/18) & Melatonin    - Continue on Neurontin as below    # BL LE neuropathy   - CIPN concern   - Pain mgt called to consult - recs for Tylenol ATC, Neurontin, Oxycodone, Dilaudid for BREAKTHROUGH pain only, Lidocaine patches on each leg, ice prn to feet  - Some improvement in pain - PT consult for TENS,   - pt with significant pain at buttocks area indurated /tender CT ordered -no abscess  - Gabapentin up titrated to 900mg po TID, switched Dilaudid po to Oxycodone 10mg po Q6H PRN moderate-severe pain and IV Dilaudid PRN severe breakthrough and with dressing changes   - Neurontin increased to 800mg q8h Monitor neuro status/ lethargy   - I have reviewed with pt to not wait to long for dose of pain medication to  prevent pain from escalating   - Pain management reconsulted, recs to stop Dilaudid PO ATC , continue Dilaudid IV for breakthrough pain with dressing changes, increase Gabapentin to 900 mg Q8, increased Cymbalta to 60 mg QD and Flexeril 5mg Q8  - Will do trial of capsaicin bid with strict instructions to avoid open /broken irritated skin/ and bandaged areas     CARDIOVASCULAR  # HTN   - HTN noted in ICU requiring cardene and esmolol GTTs   - Lisinopril dc'ed to increase Catapres   - Continue on Norvasc 10, catapres weaned off 4/3  - Monitor BP     # AFIB   - Hx of pAFIB   - No RVR episodes or pAFIB episodes in ICU   - No rate control medications needed  - Monitor on telemetry     # RV dilation second to PHTN   - POCUS on arrival to [facility] with RVE concern   - TTE 10/2024 with EF 55-60 with normal LVSF, moderate LVH and normal RVSF and size with no concern for pHTN   - TTE on 2/25 at  with EF 61 and normal LVSF, but enlarged RV size with reduced RVSF, mild TR, mild PHTN with PASP 49, and dilated IVC to 3.4cn, but normal TAPSE 2.1.  - Continue on aggressive diuresis in ICU    - TTE 3/11 with RVSF reduced with TAPSE 1.6. IVC improved to 2.12cm.   - Lasix 40 PO QD discontinued 4/7 secondary to ELLA  - Monitor IO/ BMP    RESPIRATORY  # ARDS second to MFPNA   - Hx of BEA on CPAP presented to  post URI with SOB and found with AHRF with progression to ARDS second to post viral MFPNA   - Intubated 2/25   - Cannulated for vvECMO 2/26   - s/p 60XLTCP tracheostomy 3/7   - Decannulated 3/7   - CTSx removed tracheostomy and ECMO sutures on 3/17   - Continue on TC QD with PMV as able with strong phonation  - Continue on nebs and HTS   -  trials continue and now trialing overnight with BIPAP for OHS (10/8/12)  - Continue on PS 18/10/40 QHS given OHS   - Decannulated 3/28   - c/w nasal cannula 4L     GI  # Dysphagia   - s/p PEG 3/7   - Attempted green dye on 3/13 and failed  - Continue on TF  - Continue on Miralax and Senna  - RPT green dye passed 3/19  - 3/20 Passed FEEST - on soft bite sized with thin liquids Added consistent carb 2/2 weight/Elevated A1c   - PEG removed in OR DSD over site 4/15     # Mild transaminitis   - LFTs elevated in ICU with TBILI elevated likely from ECMO hemolysis   - US ABD with hepatic steatosis   - Trend LFTs      RENAL  # ELLA   - ELLA likely from cardiorenal with RVE   - Diuresed in ICU and improved   - Monitor renal function and UOP   - Recurrent ELLA noted suspected in s/o vancomycin toxicity (4/7)   - UA negative and Urine Studies FeNa 0.9 % suggest pre-renal etiology  - Lasix discontinued for now  - s/p IV fluid trial on 4/8 with minimal improvement  - Trialing additional fluids on 4/10   - Trend BMP and I/Os    # Hypernatremia (resolved)  - Hypernatremia with fever spikes   - Fever curve improved and attempting to wean FWF   - now off FWF    INFECTIOUS DISEASE  # Recurrent fevers with fungemia from unknown source   - Recurrent fevers post ECMO decannulation thought initially to be cytokine surge   - BCx, SCx, UCx, RVP and MRSA RPT on 3/12 negative   - Completed zosyn (3/12-3/18) empirically with improved WBC , however recurrent fevers noted.   - RPT SCx, UA, CXR and RVP negative  - TTE 3/17 with no IE  - PanCT 3/17 with PNA and persistent panniculitis  - BCx 3/15 and 3/16 with candida albicans, rpt BCx 3/18 and 3/20 negative   - Concern for possible source from panniculitis   - Continue on Caspofungin 70 mg daily 3/17 - 4/15, ID consult appreciated   - 3/30 spiked temp cx's sent + ua and c/s pending Bcx neg to date - started on vanco /zosyn ID following  - Vanco stopped (4/7) due to ELLA concern for Vanco toxicty and continued on empiric Zosyn/ Caspofungin   - 4/3 - persistent fevers, leukocytosis. Will repeat CT C/A/P to evaluate for source - no source found - Bilat lung opacities mildly decreased     - 4/8 bone scan: : "1. Large area of soft tissue infection in the R side of the pelvis as detailed above. Slightly more intense activity in the R iliac bone compared to the L raises possibility of OM. 2. Intense activity in the lateral aspect of the R forefoot compatible with soft tissue infection; complicating osteomyelitis is not excluded. Milder activity in the medial aspect of the L forefoot may reflect soft tissue infection."  - ID consulted recs to continue on Zosyn 3.375 g 8, plan on 6 week course for possible OM until 5/11 and s/p Caspofungin 70 mg daily, which ended 4/15  - MR Pelvis Bony (4/11): No osteomyelitis. Sacral decubitus wound is again seen extending superiorly along the superficial margin of the right gluteus zurdo to the level of L5 with small amount of fluid and gas within the tract. The underlying gluteus zurdo demonstrates a region of decreased enhancement consistent with ischemia or necrosis measuring up to 11.9 cm transversely. Muscle edema suggests a nonspecific myositis. Small bilateral hip joint effusions with mild synovitis is nonspecific.  - S/P sacral wound debridement with cultures growing Providencia and Bacteroides (4/15)  - Caspofungin finished 4/15    # MFPNA and abdominal pannus cellulitis   - Presented to  post URI with SOB and found with AHRF with progression to ARDS second to post viral MFPNA   - RVP, legionella, strept, BAL, BCx, UCx, HIV, PCP, and adenovirus PCR negative   - Initially started on multiple agents in ICU and ultimately completed zosyn (2/26-3/9) ZMAX (2/25-2/26) and vanco (2/26-3/1)     # R/o Sacral OM  - Noted with hard indurated sacral wound by Wound Care Team  - CT Pelvis (3/29) revealing superficial skin thickening overlying the sacrum with underlying edema of the subcutaneous fat, in keeping with the clinical history of sacral wound. No evidence of underlying tract or drainable fluid collection.  - CT 4/3 - no fluid collection in sacral tissue   - Given ongoing fever with no clear source with obtain MRI Pelvis per ID recs to r/o sacral OM once renal fx improves  - S/P sacral wound debridement with cultures growing Providencia and Bacteroides (4/15)    # Penile discharge   - GC/ chlamydia negative, HIV negative   - Monitor for now    # PPX   - MRSA PCR positive and completed bactroban (2/26-3/3)     HEME   # Anemia likely second to ECMO circuit vs AOCD   - HH low in ICU second to ECMO circuit   - HH dropping again today however no bleeding noted   - Microcytic and hemochromic, sent anemia panel 3/19  - Trend H/H     VASCULAR   # R IJ and BL LE DVTs   - Post ECMO dopplers on 3/9 negative for BL UE/ LE DVTs.   - Subsequent post ECMO dopplers performed on 3/14 and noted with acute, non-occlusive deep vein thrombosis noted within the right internal jugular vein. Acute, occlusive deep vein thromboses noted within the right and left peroneal veins at both mid calves, and age-indeterminate, occlusive deep vein thrombosis visualized within the left gastrocnemius vein at the proximal calf.     - Continue on argatroban GTT and changed to Eliquis   - S/P heparin gtt prior to PEG removal now transitioned back to Eliquis (4/17)    PODIATRY   # BL plantar feet blisters likely pressors induced  - Seen by podiatry and blisters lanced on 3/4 and again on 3/18  - Continue on local wound care per podiatry   - Podiatry following  - OK for WBAT will fu with PT for offloading shoe if appropriate  - Podiatry F/U 3/28 / 4/3 done /following     ENDOCRINE  # Pre-DM2   - A1C 6.7%  - Continue on ISS, Monitor FS   - IF no demand then stop ISS     LINES/ TUBES  - R IJ and R FEM ECMO (2/26-3/7)     SKIN  # Pannus canndial intertrigo   # Sacrum/ buttock MAD  - Continue on clotrimazole cream   - WOC appreciated   - Seen by WC pt noted to have oozing from Buttock wound surgicel placed by wound care On follow up no further bleeding  - Wound care placed orders   - No bleeding from wound     ETHICS/ GOC    - FULL CODE     DISPO - PT following  Seen by PMR for acute rehab per recs  ***************  4/19-no new events  4/20-no new issues   38 YO M with PMHx of morbid obesity, BEA on CPAP, pAFIB (not on AC), HTN, and BL papilledema attributed to pseudotumor cerebri who presented initially to [facility] on 2/24 with SOB and BL LE edema. Found with URI outpatient with progressive SOB, and concern for noted for MFPNA on imaging. Course progressed with AHRF with worsening O2 demand, respiratory distress, secretions, and somnolence requiring intubation (difficult with success after 6 attempts) in ED and admission to [facility] MICU. While in MICU, patient noted with increasing O2 demand with no improvement despite optimal vent management. Concern noted for progressive ARDS, unable to prone given morbid obesity, and ultimately cannulated for vvECMO on 2/25 and transferred to [facility] for further management on 2/26. While at [facility], tx with diuresis and ABX, and ultimately decannulated and s/p 60XLTCP trach and PEG on 3/7. Patient ultimately transferred to [facility] on 3/11 and course complicated by recurrent high grade fevers and found with fungemia.    NEUROLOGY  # Hx of Pseudotumor Cerebri   - s/p LP in 2024 with high opening pressure  - s/p MRI 2024 with possible pituitary mass but unclear because of pressure effect from pseudotumor cerebri causing partially empty sella.  - Prolactin elevated   - ACTH low but received steroids during admission   - FT4 low normal and TSH normal, but given FT4 low normal TSH should be higher   - Discuss Endocrine consult for in house work up vs outpatient.     # Sedation   - Weaned off sedation in ICU   - Nimbex turned off 2/27, Prop turned off 3/9   - Precedex turned off and catapres started 3/11  - catapres 0.2 TID (decreased 3/25) - tapered down 3/31 to 0.1 tid - discontinued 4/3     # Insomnia   - Patient worked night shift x 15 years   - Trouble sleeping at night leading to day time sleepiness and poor participation with PT  - Continue on Seroquel 25 QHS (increased 3/18) & Melatonin    - Continue on Neurontin as below    # BL LE neuropathy   - CIPN concern   - Pain mgt called to consult - recs for Tylenol ATC, Neurontin, Oxycodone, Dilaudid for BREAKTHROUGH pain only, Lidocaine patches on each leg, ice prn to feet  - Some improvement in pain - PT consult for TENS,   - pt with significant pain at buttocks area indurated /tender CT ordered -no abscess  - Gabapentin up titrated to 900mg po TID, switched Dilaudid po to Oxycodone 10mg po Q6H PRN moderate-severe pain and IV Dilaudid PRN severe breakthrough and with dressing changes   - Neurontin increased to 800mg q8h Monitor neuro status/ lethargy   - I have reviewed with pt to not wait to long for dose of pain medication to  prevent pain from escalating   - Pain management reconsulted, recs to stop Dilaudid PO ATC , s/p Dilaudid IV for breakthrough pain with dressing changes changed to Oxy 5mg PRN BID, increased Gabapentin to 900 mg Q8, increased Cymbalta to 60 mg QD and Flexeril 5mg Q8  - Will do trial of capsaicin bid with strict instructions to avoid open /broken irritated skin/ and bandaged areas     CARDIOVASCULAR  # HTN   - HTN noted in ICU requiring Cardene and esmolol GTTs   - Lisinopril dc'ed to increase Catapres   - Continue on Norvasc 10, catapres weaned off 4/3  - Monitor BP     # AFIB   - Hx of pAFIB   - No RVR episodes or pAFIB episodes in ICU   - No rate control medications needed  - Monitor on telemetry     # RV dilation second to PHTN   - POCUS on arrival to [facility] with RVE concern   - TTE 10/2024 with EF 55-60 with normal LVSF, moderate LVH and normal RVSF and size with no concern for pHTN   - TTE on 2/25 at  with EF 61 and normal LVSF, but enlarged RV size with reduced RVSF, mild TR, mild PHTN with PASP 49, and dilated IVC to 3.4cn, but normal TAPSE 2.1.  - Continue on aggressive diuresis in ICU    - TTE 3/11 with RVSF reduced with TAPSE 1.6. IVC improved to 2.12cm.   - Lasix 40 PO QD discontinued 4/7 secondary to ELLA  - Monitor IO/ BMP    RESPIRATORY  # ARDS second to MFPNA   - Hx of BEA on CPAP presented to  post URI with SOB and found with AHRF with progression to ARDS second to post viral MFPNA   - Intubated 2/25   - Cannulated for vvECMO 2/26   - s/p 60XLTCP tracheostomy 3/7   - Decannulated 3/7   - CTSx removed tracheostomy and ECMO sutures on 3/17   - Continue on TC QD with PMV as able with strong phonation  - Continue on nebs and HTS   -  trials continue and now trialing overnight with BIPAP for OHS (10/8/12)  - Continue on PS 18/10/40 QHS given OHS   - Decannulated 3/28   - c/w nasal cannula 4L     GI  # Dysphagia   - s/p PEG 3/7   - Attempted green dye on 3/13 and failed  - Continue on TF  - Continue on Miralax and Senna  - RPT green dye passed 3/19  - 3/20 Passed FEEST - on soft bite sized with thin liquids Added consistent carb 2/2 weight/Elevated A1c   - 4/15 PEG removed in OR DSD over site     # Mild transaminitis   - LFTs elevated in ICU with TBILI elevated likely from ECMO hemolysis   - US ABD with hepatic steatosis   - Trend LFTs      RENAL  # ELLA   - ELLA likely from cardiorenal with RVE   - Diuresed in ICU and improved   - Monitor renal function and UOP   - Recurrent ELLA noted suspected in s/o vancomycin toxicity (4/7)   - UA negative and Urine Studies FeNa 0.9 % suggest pre-renal etiology  - Lasix discontinued for now  - s/p IV fluid trial on 4/8 with minimal improvement  - Trialing additional fluids on 4/10   - Trend BMP and I/Os    # Hypernatremia (resolved)  - Hypernatremia with fever spikes   - Fever curve improved and attempting to wean FWF   - now off FWF    INFECTIOUS DISEASE  # Recurrent fevers with fungemia from unknown source   - Recurrent fevers post ECMO decannulation thought initially to be cytokine surge   - BCx, SCx, UCx, RVP and MRSA RPT on 3/12 negative   - Completed zosyn (3/12-3/18) empirically with improved WBC , however recurrent fevers noted.   - RPT SCx, UA, CXR and RVP negative  - TTE 3/17 with no IE  - PanCT 3/17 with PNA and persistent panniculitis  - BCx 3/15 and 3/16 with candida albicans, rpt BCx 3/18 and 3/20 negative   - Concern for possible source from panniculitis   - Continue on Caspofungin 70 mg daily 3/17 - 4/15, ID consult appreciated   - 3/30 spiked temp cx's sent + ua and c/s pending Bcx neg to date - started on vanco /zosyn ID following  - Vanco stopped (4/7) due to ELLA concern for Vanco toxicty and continued on empiric Zosyn/ Caspofungin   - 4/3 - persistent fevers, leukocytosis. Will repeat CT C/A/P to evaluate for source - no source found - Bilat lung opacities mildly decreased     - 4/8 bone scan: : "1. Large area of soft tissue infection in the R side of the pelvis as detailed above. Slightly more intense activity in the R iliac bone compared to the L raises possibility of OM. 2. Intense activity in the lateral aspect of the R forefoot compatible with soft tissue infection; complicating osteomyelitis is not excluded. Milder activity in the medial aspect of the L forefoot may reflect soft tissue infection."  - ID consulted recs to continue on Zosyn 3.375 g 8, plan on 6 week course for possible OM until 5/11 and s/p Caspofungin 70 mg daily, which ended 4/15  - MR Pelvis Bony (4/11): No osteomyelitis. Sacral decubitus wound is again seen extending superiorly along the superficial margin of the right gluteus zurdo to the level of L5 with small amount of fluid and gas within the tract. The underlying gluteus zurdo demonstrates a region of decreased enhancement consistent with ischemia or necrosis measuring up to 11.9 cm transversely. Muscle edema suggests a nonspecific myositis. Small bilateral hip joint effusions with mild synovitis is nonspecific.  - S/P sacral wound debridement with cultures growing Providencia and Bacteroides (4/15)  - Caspofungin finished 4/15    # MFPNA and abdominal pannus cellulitis   - Presented to  post URI with SOB and found with AHRF with progression to ARDS second to post viral MFPNA   - RVP, legionella, strept, BAL, BCx, UCx, HIV, PCP, and adenovirus PCR negative   - Initially started on multiple agents in ICU and ultimately completed zosyn (2/26-3/9) ZMAX (2/25-2/26) and Vanco (2/26-3/1)     # R/o Sacral OM  - Noted with hard indurated sacral wound by Wound Care Team  - CT Pelvis (3/29) revealing superficial skin thickening overlying the sacrum with underlying edema of the subcutaneous fat, in keeping with the clinical history of sacral wound. No evidence of underlying tract or drainable fluid collection.  - CT 4/3 - no fluid collection in sacral tissue   - Given ongoing fever with no clear source with obtain MRI Pelvis per ID recs to r/o sacral OM once renal fx improves  - S/P sacral wound debridement with cultures growing Providencia and Bacteroides (4/15)    # Penile discharge   - GC/ chlamydia negative, HIV negative   - Monitor for now    # PPX   - MRSA PCR positive and completed Bactroban (2/26-3/3)     HEME   # Anemia likely second to ECMO circuit vs AOCD   - HH low in ICU second to ECMO circuit   - HH dropping again today however no bleeding noted   - Microcytic and hemochromic, sent anemia panel 3/19  - Trend H/H     VASCULAR   # R IJ and BL LE DVTs   - Post ECMO dopplers on 3/9 negative for BL UE/ LE DVTs.   - Subsequent post ECMO dopplers performed on 3/14 and noted with acute, non-occlusive deep vein thrombosis noted within the right internal jugular vein. Acute, occlusive deep vein thromboses noted within the right and left peroneal veins at both mid calves, and age-indeterminate, occlusive deep vein thrombosis visualized within the left gastrocnemius vein at the proximal calf.     - Continue on argatroban GTT and changed to Eliquis   - S/P heparin gtt prior to PEG removal now transitioned back to Eliquis (4/17)    PODIATRY   # BL plantar feet blisters likely pressors induced  - Seen by podiatry and blisters lanced on 3/4 and again on 3/18  - Continue on local wound care per podiatry   - Podiatry following- oK for WBAT will fu with PT for offloading shoe if appropriate  - Podiatry F/U 3/28 / 4/3 done /following     ENDOCRINE  # Pre-DM2   - A1C 6.7%  - Continue on ISS, Monitor FS   - IF no demand then stop ISS     LINES/ TUBES  - R IJ and R FEM ECMO (2/26-3/7)     SKIN  # Pannus canndial intertrigo   # Sacrum/ buttock MAD  - Continue on clotrimazole cream   - WOC appreciated   - Seen by WC pt noted to have oozing from Buttock wound surgicel placed by wound care On follow up no further bleeding  - Wound care placed orders   - No bleeding from wound     ETHICS/ GOC    - FULL CODE     DISPO - PT following  Seen by PM&R for acute rehab per recs  ***************  4/19-no new events  4/20-no new issues

## 2025-04-21 NOTE — PROGRESS NOTE ADULT - ASSESSMENT
Assessment/Plan: 38 YO M with PMHx of morbid obesity, BEA on CPAP, pAFIB (not on AC), HTN, and BL papilledema attributed to pseudotumor cerebri who presented initially to [facility] on 2/24 with SOB and BL LE edema. Found with URI outpatient with progressive SOB, and concern for noted for MFPNA on imaging. Course progressed with AHRF with worsening O2 demand, respiratory distress, secretions, and somnolence requiring intubation (difficult with success after 6 attempts) in ED and admission to [facility] MICU. While in MICU, patient noted with increasing O2 demand with no improvement despite optimal vent management. Concern noted for progressive ARDS, unable to prone given morbid obesity, and ultimately cannulated for vvECMO on 2/25 and transferred to [facility] for further management on 2/26. While at [facility], tx with diuresis and ABX, and ultimately decannulated and s/p 60XLTCP trach and PEG on 3/7. Patient ultimately transferred to [facility] on 3/11 and course complicated by recurrent high grade fevers and found with fungemia.    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 associated dermatitis s/p OR debridement 4/15  -Intertriginous moisture associated dermatitis to bilateral gluteal folds and right anterior thigh/groin with previous denuded epidermis  -Risk of intertriginous dermatitis to remaining intertriginous folds  -LUQ surgical wound s/p PEG tube removal     Plan:  -Per records initially with deep tissue pressure injury --> evolved to unstageable pressure injury-->further evolved to stage 4 pressure injury (was receiving chemical debridement with Dakins solution), remains with increased moisture secondary to body habitus, fecal and urinary incontinence, history of critical illness (mechanical ventilation > 72 hours, sepsis, was on ECMO), and limited mobility.   -4/11, MRI Bony pelvis only without contrast- 1.  No osteomyelitis. 2.  Sacral decubitus wound is again seen extending superiorly along the superficial margin of the right gluteus zurdo to the level of L5 with small amount of fluid and gas within the tract. 3.  The underlying gluteus zurdo demonstrates a region of decreased enhancement consistent with ischemia or necrosis measuring up to 11.9 cm transversely.4.  Muscle edema suggests a nonspecific myositis.5.  Small bilateral hip joint effusions with mild synovitis is nonspecific.  -4/15, s/p OR debridement by general surgery  -4/15 sacral wound culture: No fungal tissue growth, Few Providencia stuartii Rare Bacteroides vulgatus group "Susceptibilities not performed". Organism Identification: Providencia stuartii  -No sharp debridement indicated today, tissue type seems to be improving, bone palpable in close proximity, no obvious drainable collection  -Will continue to hold VAC veraflow - wound base friable, deep tunnel from 12-1 o'clock with indeterminant end. Will continue to monitor, may be VAC candidate at later time.  -ABx management per ID; WBC wnl, afebrile "Continue with Zosyn 3.375 g 8, plan on 6 week course for possible OM until 5/11."  -Topical recommendations:   ·	LUQ surgical wound previous site of PEG tube: Clean wound and periwound skin with NS. Pat dry. Cover with small silicone foam with border. Change every other day or prn if soiled   ·	Sacral/gluteal cleft to bilateral buttocks- Irrigate deep cavity and tunneling with Dakins solution 1/4 strength using peribottle or flushes, cleanse wound and satellite ulcerations to b/l buttocks with NS, Dry well. Apply Liquid barrier film to periwound skin. Apply TRIAD barrier paste to isolated ulcer overlying left buttock,  Apply Aquacel hydrofiber sheet to right buttock, pack sacral/gluteal cleft including deep tunnels with Dakins 1/4 strength moistened kerlix, leave at least 2" out at end to ensure full removal of packing with subsequent dressing changes. Cover entire area (sacrum and right buttock) with abdominal pad and tegaderm. Change twice a day and prn if soiled/compromised. Once dressing is in place and perineal care is provided, clean remaining skin with perineal spray, apply TRIAD moisture barrier cream to exposed skin every shift and prn.  ·	Bilateral abdominal pannus, bilateral groin: Cleanse with luke-warm soap and water, dry well. Apply Interdry textile sheeting, under intertriginous folds leaving 2 inches exposed at ends to wick, remove to wash & dry affected area, then replace. Individual sheeting may be used for up to 5 days unless soiled. Inspect skin daily.   -Continue to offload pressure; bariatric low airloss support surface, turn and position per protocol with use of fluidized positioning devices, continue incontinence and moisture care per protocol and use of single breathable incontinence pads, continue to offload heels with fluidized positioning devices/Ochoa-Lock positioning device (PS# [id number]). Continue use of bariatric seat cushion (people soft #[id number]) and limit sit time- no more than 2 consecutive hours at any given time.   -Continue Nutritional management as per RD recommendations- if not contraindicated please add Efrain to optimize wound healing  -B/l feet management per Podiatry surgery   -Appreciate PT and safe patient handling    Upon discharge follow up at outpatient [facility]. [address] [place] [address]. [phone].    Patient seen with Dr. Bravo today.   Findings and plan discussed with patient, father at bedside and primary team. All questions and concerns addressed to meet patient and family's satisfaction.  Will continue to follow periodically while inpatient, please reconsult earlier as needed.  Remainder of care per primary team.  Thank you.    EDOUARD Driscoll, CWN   MS TEAMS    If after 4PM or before 7:30AM on Mon-Friday or weekend/holiday please contact general surgery for urgent matters.   Team A- [id number]   Team B- [id number]  For non-urgent matters e-mail [email or web address]    We spent 50 minutes face-to-face with this patient of which more than 50% of the time was spent counseling/coordinating care of this patient. Assessment/Plan: 38 YO M with PMHx of morbid obesity, BEA on CPAP, pAFIB (not on AC), HTN, and BL papilledema attributed to pseudotumor cerebri who presented initially to [facility] on 2/24 with SOB and BL LE edema. Found with URI outpatient with progressive SOB, and concern for noted for MFPNA on imaging. Course progressed with AHRF with worsening O2 demand, respiratory distress, secretions, and somnolence requiring intubation (difficult with success after 6 attempts) in ED and admission to [facility] MICU. While in MICU, patient noted with increasing O2 demand with no improvement despite optimal vent management. Concern noted for progressive ARDS, unable to prone given morbid obesity, and ultimately cannulated for vvECMO on 2/25 and transferred to [facility] for further management on 2/26. While at [facility], tx with diuresis and ABX, and ultimately decannulated and s/p 60XLTCP trach and PEG on 3/7. Patient ultimately transferred to [facility] on 3/11 and course complicated by recurrent high grade fevers and found with fungemia.    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 associated dermatitis s/p OR debridement 4/15  -Intertriginous moisture associated dermatitis to bilateral gluteal folds and right anterior thigh/groin with previous denuded epidermis  -Risk of intertriginous dermatitis to remaining intertriginous folds  -LUQ surgical wound s/p PEG tube removal     Plan:  -Per records initially with deep tissue pressure injury --> evolved to unstageable pressure injury-->further evolved to stage 4 pressure injury (was receiving chemical debridement with Dakins solution), remains with increased moisture secondary to body habitus, fecal and urinary incontinence, history of critical illness (mechanical ventilation > 72 hours, sepsis, was on ECMO), and limited mobility.   -4/11, MRI Bony pelvis only without contrast- 1.  No osteomyelitis. 2.  Sacral decubitus wound is again seen extending superiorly along the superficial margin of the right gluteus zurdo to the level of L5 with small amount of fluid and gas within the tract. 3.  The underlying gluteus zurdo demonstrates a region of decreased enhancement consistent with ischemia or necrosis measuring up to 11.9 cm transversely.4.  Muscle edema suggests a nonspecific myositis.5.  Small bilateral hip joint effusions with mild synovitis is nonspecific.  -4/15, s/p OR debridement by general surgery  -4/15 sacral wound culture: No fungal tissue growth, Few Providencia stuartii Rare Bacteroides vulgatus group "Susceptibilities not performed". Organism Identification: Providencia stuartii  -No sharp debridement indicated today, tissue type seems to be improving, bone palpable in close proximity, no obvious drainable collection  -Will continue to hold VAC veraflow - wound base friable, deep tunnel from 12-1 o'clock with indeterminant end. Will continue to monitor, may be VAC candidate at later time.  -ABx management per ID; WBC wnl, afebrile "Continue with Zosyn 3.375 g 8, plan on 6 week course for possible OM until 5/11."  -Topical recommendations:   ·	LUQ surgical wound previous site of PEG tube: Clean wound and periwound skin with NS. Pat dry. Cover with small silicone foam with border. Change every other day or prn if soiled   ·	Sacral/gluteal cleft to bilateral buttocks- Irrigate deep cavity and tunneling with Dakins solution 1/4 strength using peribottle or flushes, cleanse wound and satellite ulcerations to b/l buttocks with NS, Dry well. Apply Liquid barrier film to periwound skin. Apply TRIAD barrier paste to isolated ulcer overlying left buttock,  Apply Aquacel hydrofiber sheet to right buttock, pack sacral/gluteal cleft including deep tunnels with Dakins 1/4 strength moistened kerlix, leave at least 2" out at end to ensure full removal of packing with subsequent dressing changes. Cover entire area (sacrum and right buttock) with abdominal pad and tegaderm. Change twice a day and prn if soiled/compromised. Once dressing is in place and perineal care is provided, clean remaining skin with perineal spray, apply TRIAD moisture barrier cream to exposed skin every shift and prn.  ·	Bilateral abdominal pannus, bilateral groin: Cleanse with luke-warm soap and water, dry well. Apply Interdry textile sheeting, under intertriginous folds leaving 2 inches exposed at ends to wick, remove to wash & dry affected area, then replace. Individual sheeting may be used for up to 5 days unless soiled. Inspect skin daily.   -Continue to offload pressure; bariatric low airloss support surface, turn and position per protocol with use of fluidized positioning devices, continue incontinence and moisture care per protocol and use of single breathable incontinence pads, continue to offload heels with fluidized positioning devices/Ochoa-Lock positioning device (PS# [id number]). Continue use of bariatric seat cushion (people soft #[id number]) and limit sit time- no more than 2 consecutive hours at any given time.   -Continue Nutritional management as per RD recommendations- if not contraindicated please add Efrain to optimize wound healing  -B/l feet management per Podiatry surgery   -Appreciate PT and safe patient handling    Upon discharge follow up at outpatient [facility]. [address] [place] [address]. [phone].    Patient seen with Dr. Bravo today.   Findings and plan discussed with patient, father at bedside and primary team. All questions and concerns addressed to meet patient and family's satisfaction.  Will continue to follow periodically while inpatient, please reconsult earlier as needed.  Remainder of care per primary team.  Thank you.    EDOUARD Driscoll, CWN   MS TEAMS    If after 4PM or before 7:30AM on Mon-Friday or weekend/holiday please contact general surgery for urgent matters.   Team A- [id number]   Team B- [id number]  For non-urgent matters e-mail [email or web address]

## 2025-04-21 NOTE — PROGRESS NOTE ADULT - NS ATTEND AMEND GEN_ALL_CORE FT
Pt seen and examined with ACP.  Assessment and plan reviewed and discussed.  Agree with above.    Status of wounds and treatment recommendations d/w  pt and pt's father at bedside.  All questions answered.   Pt and father expressed understanding.    I spent 50   minutes face to face w/ this pt of which more than 50% of the time was spent counseling & coordinating care of this pt.

## 2025-04-21 NOTE — PROGRESS NOTE ADULT - SUBJECTIVE AND OBJECTIVE BOX
Infectious Diseases Follow Up:    Patient is a 39y old  Male who presents with a chief complaint of sob (02 Mar 2025 06:19)      Interval History/ROS:  No acute events noted     Allergies  No Known Allergies        ANTIMICROBIALS:  piperacillin/tazobactam IVPB.. 3.375 every 8 hours      Current Abx:     Previous Abx     OTHER MEDS:  MEDICATIONS  (STANDING):  acetaminophen     Tablet .. 650 every 6 hours PRN  albuterol/ipratropium for Nebulization 3 every 6 hours PRN  amLODIPine   Tablet 10 daily  apixaban 5 every 12 hours  cyclobenzaprine 5 three times a day PRN  dextrose 50% Injectable 25 once  dextrose 50% Injectable 12.5 once  dextrose 50% Injectable 25 once  dextrose Oral Gel 15 once PRN  DULoxetine 60 daily  gabapentin 900 every 8 hours  glucagon  Injectable 1 once  HYDROmorphone  Injectable 0.5 every 12 hours PRN  insulin lispro (ADMELOG) corrective regimen sliding scale  three times a day before meals  insulin lispro (ADMELOG) corrective regimen sliding scale  at bedtime  melatonin 3 at bedtime  oxyCODONE    IR 10 every 6 hours PRN  pantoprazole    Tablet 40 before breakfast  polyethylene glycol 3350 17 daily  QUEtiapine 25 at bedtime  senna 2 at bedtime      Vital Signs Last 24 Hrs  T(C): 36.3 (21 Apr 2025 04:00), Max: 36.7 (20 Apr 2025 12:00)  T(F): 97.4 (21 Apr 2025 04:00), Max: 98.1 (20 Apr 2025 12:00)  HR: 103 (21 Apr 2025 07:44) (103 - 116)  BP: 135/86 (21 Apr 2025 04:00) (135/86 - 155/99)  BP(mean): 96 (21 Apr 2025 04:00) (96 - 112)  RR: 18 (21 Apr 2025 04:00) (18 - 20)  SpO2: 96% (21 Apr 2025 07:44) (92% - 100%)    Parameters below as of 21 Apr 2025 04:00  Patient On (Oxygen Delivery Method): room air          PHYSICAL EXAM:  GENERAL: NAD  HEAD:  Atraumatic, Normocephalic  EYES: EOMI, conjunctiva and sclera clear  CHEST/LUNG: On NC, CTAB  HEART: RRR  ABDOMEN: Soft, Nontender, Nondistended  Extremities: B/l feet wrapped   PSYCH: AAOx3                            8.5    7.99  )-----------( 363      ( 21 Apr 2025 05:45 )             27.0       04-21    139  |  98  |  12  ----------------------------<  83  3.7   |  28  |  1.37[H]    Ca    9.9      21 Apr 2025 05:45  Phos  4.9     04-21  Mg     1.80     04-21        Urinalysis Basic - ( 21 Apr 2025 05:45 )    Color: x / Appearance: x / SG: x / pH: x  Gluc: 83 mg/dL / Ketone: x  / Bili: x / Urobili: x   Blood: x / Protein: x / Nitrite: x   Leuk Esterase: x / RBC: x / WBC x   Sq Epi: x / Non Sq Epi: x / Bacteria: x        MICROBIOLOGY:  v  Tissue SACRAL WOUND AEBNDEMENT  04-15-25   Few Providencia stuartii  Rare Bacteroides vulgatus group "Susceptibilities not performed"  --  Providencia stuartii      Blood Blood-Venous  04-07-25   No growth at 5 days  --  --      Blood Blood-Peripheral  04-07-25   No growth at 5 days  --  --      Clean Catch Clean Catch (Midstream)  03-31-25   <10,000 CFU/mL Normal Urogenital Keysha  --  --      Blood Blood-Peripheral  03-30-25   No growth at 5 days  --  --                RADIOLOGY:

## 2025-04-21 NOTE — PROGRESS NOTE ADULT - ASSESSMENT
This is a 40 y/o M w/ PMHx AF (not on AC), HTN, BEA, pseudotumor cerebri s/p LP in 2024, initially presented to [facility] on 2/24, SOB, LE edema with URI symptoms and sick contacts, noted to have severe ARDS, intubated however still hypoxia, cannulated for VV ECMO on 2/25, transferred to [facility] on 2/25, started on empiric Vanco, Zosyn for multifocal PNA, abdominal wall cellulitis, s/p trach placement by CT surgery on 3/7, ECMO decannulated on 3/7. Zosyn held on 3/9.  C/b fevers on 3/10, restarted on Zosyn, transferred to [facility] on 3/13, Bcx now w/ yeast.    #Sacral ulcer, with extensive tunneling, no OM on MRI, however c/f OM on WBC scan  #Candida albicans fungemia   #Fevers   #Multifocal PNA, abdominal wall cellulitis s/p Vanco/Zosyn  #ARDS, hypoxic respiratory failure requiring VV EMCO 2/2 multifocal PNA? s/p decannulation on 3/7    Overall, 40 y/o M w/ PMHx AF (not on AC), HTN, BEA, pseudotumor cerebri s/p LP in 2024 admitted to [facility] on 2/26 for ARDS, hypoxic respiratory failure requiring VV EMCO 2/2 multifocal PNA? s/p decannulation on 3/7, c/b abdominal wall cellulitis s/p Vancomycin/Zosyn, s/p trach on 3/7, in the setting of persistent fevers despite Zosyn, BCx now w/ yeast, Candida albicans   Unclear source for yeast in BCx, pt had all central lines removed on 3/7, not getting TPN, no abdominal pain on exam, PEG site well appearing.     CT A/P w/o clear abdominal source of fungemia, possibly 2/2 abdominal wall cellulitis? TTE w/o IE.  BCx 3/18 1/2 positive, 3/20 NGTD x 2.     Pt with worsening sepsis on 3/30, febrile to 103, WBC 16.   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   Pt with deep tunneled wound to left, no drainage, malodor per wound care.   CT A/P w/ b/l pulmonary opacities but improved, no sacral abscess   WBC scan with soft tissue infection R side of pelvis, intense activity in R iliac bone possible OM. Lateral R forefoot with soft tissue infection, possible OM?  Wound examined with wound care, large R buttocks soft tissue ulcer, does not look infected, however deep tunneling with murky fluid, likely source of infection   MRI w/o OM, however given WBC scan findings, will still treat for 6 weeks     S/p PEG removal and sacral ulcer debridement, gram stain w/ providenciae, bacteroides     Recommendations;   1. Continue with Zosyn 3.375 g 8, plan on 6 week course for possible OM until 5/11.   2. S/p Caspofungin 70 mg daily, 4 week course ended 4/15    Thank you for consulting us and involving us in the management of this patient's case. In addition to reviewing history, imaging, documents, labs, microbiology, and infection control strategies and potential issues.     ID will continue to follow    Shailesh Owens M.D.  Attending Physician  Division of Infectious Diseases  Department of Medicine    Please contact through MS Teams message.  Office: [phone] (after 5 PM or weekend)

## 2025-04-21 NOTE — PROGRESS NOTE ADULT - SUBJECTIVE AND OBJECTIVE BOX
Patient is a 39y old  Male who presents with a chief complaint of sob (02 Mar 2025 06:19)      SUBJECTIVE / OVERNIGHT EVENTS:    MEDICATIONS  (STANDING):  amLODIPine   Tablet 10 milliGRAM(s) Oral daily  apixaban 5 milliGRAM(s) Oral every 12 hours  capsaicin HP 0.075% Cream 1 Application(s) Topical four times a day  chlorhexidine 2% Cloths 1 Application(s) Topical <User Schedule>  clotrimazole 1% Cream 1 Application(s) Topical two times a day  Dakins Solution - 1/4 Strength 1 Application(s) Topical two times a day  dextrose 50% Injectable 25 Gram(s) IV Push once  dextrose 50% Injectable 12.5 Gram(s) IV Push once  dextrose 50% Injectable 25 Gram(s) IV Push once  DULoxetine 60 milliGRAM(s) Oral daily  gabapentin 900 milliGRAM(s) Oral every 8 hours  glucagon  Injectable 1 milliGRAM(s) IntraMuscular once  insulin lispro (ADMELOG) corrective regimen sliding scale   SubCutaneous three times a day before meals  insulin lispro (ADMELOG) corrective regimen sliding scale   SubCutaneous at bedtime  lidocaine   4% Patch 1 Patch Transdermal daily  lidocaine   4% Patch 1 Patch Transdermal daily  melatonin 3 milliGRAM(s) Oral at bedtime  multivitamin 1 Tablet(s) Oral daily  mupirocin 2% Ointment 1 Application(s) Topical two times a day  pantoprazole    Tablet 40 milliGRAM(s) Oral before breakfast  piperacillin/tazobactam IVPB.. 3.375 Gram(s) IV Intermittent every 8 hours  polyethylene glycol 3350 17 Gram(s) Oral daily  QUEtiapine 25 milliGRAM(s) Oral at bedtime  senna 2 Tablet(s) Oral at bedtime  sevelamer carbonate 800 milliGRAM(s) Oral three times a day with meals    MEDICATIONS  (PRN):  acetaminophen     Tablet .. 650 milliGRAM(s) Oral every 6 hours PRN Temp greater or equal to 38C (100.4F), Mild Pain (1 - 3), Moderate Pain (4 - 6)  albuterol/ipratropium for Nebulization 3 milliLiter(s) Nebulizer every 6 hours PRN Shortness of Breath and/or Wheezing  cyclobenzaprine 5 milliGRAM(s) Oral three times a day PRN Muscle Spasm  dextrose Oral Gel 15 Gram(s) Oral once PRN Blood Glucose LESS THAN 70 milliGRAM(s)/deciliter  HYDROmorphone  Injectable 0.5 milliGRAM(s) IV Push every 12 hours PRN dressing changes and severe breakthrough  oxyCODONE    IR 10 milliGRAM(s) Oral every 6 hours PRN Severe Pain (7 - 10)        CAPILLARY BLOOD GLUCOSE      POCT Blood Glucose.: 99 mg/dL (21 Apr 2025 07:50)  POCT Blood Glucose.: 94 mg/dL (20 Apr 2025 22:14)  POCT Blood Glucose.: 104 mg/dL (20 Apr 2025 17:10)  POCT Blood Glucose.: 110 mg/dL (20 Apr 2025 11:17)    I&O's Summary    20 Apr 2025 07:01  -  21 Apr 2025 07:00  --------------------------------------------------------  IN: 0 mL / OUT: 1700 mL / NET: -1700 mL        PHYSICAL EXAM:  GENERAL: NAD, well-developed  HEAD:  Atraumatic, Normocephalic  EYES: EOMI, PERRLA, conjunctiva and sclera clear  NECK: Supple, No JVD  CHEST/LUNG: Clear to auscultation bilaterally; No wheeze  HEART: Regular rate and rhythm; No murmurs, rubs, or gallops  ABDOMEN: Soft, Nontender, Nondistended; Bowel sounds present  EXTREMITIES:  2+ Peripheral Pulses, No clubbing, cyanosis, or edema  PSYCH: AAOx3  NEUROLOGY: non-focal  SKIN: No rashes or lesions    LABS:                        8.5    7.99  )-----------( 363      ( 21 Apr 2025 05:45 )             27.0     04-21    139  |  98  |  12  ----------------------------<  83  3.7   |  28  |  1.37[H]    Ca    9.9      21 Apr 2025 05:45  Phos  4.9     04-21  Mg     1.80     04-21            Urinalysis Basic - ( 21 Apr 2025 05:45 )    Color: x / Appearance: x / SG: x / pH: x  Gluc: 83 mg/dL / Ketone: x  / Bili: x / Urobili: x   Blood: x / Protein: x / Nitrite: x   Leuk Esterase: x / RBC: x / WBC x   Sq Epi: x / Non Sq Epi: x / Bacteria: x        RADIOLOGY & ADDITIONAL TESTS:    Imaging Personally Reviewed:    Consultant(s) Notes Reviewed:      Care Discussed with Consultants/Other Providers:   Patient is a 39y old  Male who presents with a chief complaint of sob (02 Mar 2025 06:19)      SUBJECTIVE / OVERNIGHT EVENTS: No acute overnight events. Pt observed sitting in chair still c/o LE burning pain and sacral pain. Notified pt that Dilaudid IV d/c today will try with Oxy 5mg prior to wound change & increased Oxy to 15mg po q6hrs    MEDICATIONS  (STANDING):  amLODIPine   Tablet 10 milliGRAM(s) Oral daily  apixaban 5 milliGRAM(s) Oral every 12 hours  capsaicin HP 0.075% Cream 1 Application(s) Topical four times a day  chlorhexidine 2% Cloths 1 Application(s) Topical <User Schedule>  clotrimazole 1% Cream 1 Application(s) Topical two times a day  Dakins Solution - 1/4 Strength 1 Application(s) Topical two times a day  dextrose 50% Injectable 25 Gram(s) IV Push once  dextrose 50% Injectable 12.5 Gram(s) IV Push once  dextrose 50% Injectable 25 Gram(s) IV Push once  DULoxetine 60 milliGRAM(s) Oral daily  gabapentin 900 milliGRAM(s) Oral every 8 hours  glucagon  Injectable 1 milliGRAM(s) IntraMuscular once  insulin lispro (ADMELOG) corrective regimen sliding scale   SubCutaneous three times a day before meals  insulin lispro (ADMELOG) corrective regimen sliding scale   SubCutaneous at bedtime  lidocaine   4% Patch 1 Patch Transdermal daily  lidocaine   4% Patch 1 Patch Transdermal daily  melatonin 3 milliGRAM(s) Oral at bedtime  multivitamin 1 Tablet(s) Oral daily  mupirocin 2% Ointment 1 Application(s) Topical two times a day  pantoprazole    Tablet 40 milliGRAM(s) Oral before breakfast  piperacillin/tazobactam IVPB.. 3.375 Gram(s) IV Intermittent every 8 hours  polyethylene glycol 3350 17 Gram(s) Oral daily  QUEtiapine 25 milliGRAM(s) Oral at bedtime  senna 2 Tablet(s) Oral at bedtime  sevelamer carbonate 800 milliGRAM(s) Oral three times a day with meals    MEDICATIONS  (PRN):  acetaminophen     Tablet .. 650 milliGRAM(s) Oral every 6 hours PRN Temp greater or equal to 38C (100.4F), Mild Pain (1 - 3), Moderate Pain (4 - 6)  albuterol/ipratropium for Nebulization 3 milliLiter(s) Nebulizer every 6 hours PRN Shortness of Breath and/or Wheezing  cyclobenzaprine 5 milliGRAM(s) Oral three times a day PRN Muscle Spasm  dextrose Oral Gel 15 Gram(s) Oral once PRN Blood Glucose LESS THAN 70 milliGRAM(s)/deciliter  HYDROmorphone  Injectable 0.5 milliGRAM(s) IV Push every 12 hours PRN dressing changes and severe breakthrough  oxyCODONE    IR 10 milliGRAM(s) Oral every 6 hours PRN Severe Pain (7 - 10)        CAPILLARY BLOOD GLUCOSE      POCT Blood Glucose.: 99 mg/dL (21 Apr 2025 07:50)  POCT Blood Glucose.: 94 mg/dL (20 Apr 2025 22:14)  POCT Blood Glucose.: 104 mg/dL (20 Apr 2025 17:10)  POCT Blood Glucose.: 110 mg/dL (20 Apr 2025 11:17)    I&O's Summary    20 Apr 2025 07:01  -  21 Apr 2025 07:00  --------------------------------------------------------  IN: 0 mL / OUT: 1700 mL / NET: -1700 mL        PHYSICAL EXAM:  GENERAL: NAD, morbidly obese  HEAD:  Atraumatic, Normocephalic  EYES: EOMI, PERRLA, conjunctiva and sclera clear  NECK: Supple, No JVD  CHEST/LUNG: Clear to auscultation bilaterally; diminished BS at bases, No wheeze  HEART: Regular rate and rhythm; S1 S2 present  ABDOMEN: Soft, Nontender, Nondistended; Bowel sounds present  EXTREMITIES:  2+ Peripheral Pulses, Scant b/l LE edema. Extremities warm to touch  PSYCH: AAOx3  NEUROLOGY: non-focal  SKIN: Refer to RN assessment note    LABS:                        8.5    7.99  )-----------( 363      ( 21 Apr 2025 05:45 )             27.0     04-21    139  |  98  |  12  ----------------------------<  83  3.7   |  28  |  1.37[H]    Ca    9.9      21 Apr 2025 05:45  Phos  4.9     04-21  Mg     1.80     04-21            Urinalysis Basic - ( 21 Apr 2025 05:45 )    Color: x / Appearance: x / SG: x / pH: x  Gluc: 83 mg/dL / Ketone: x  / Bili: x / Urobili: x   Blood: x / Protein: x / Nitrite: x   Leuk Esterase: x / RBC: x / WBC x   Sq Epi: x / Non Sq Epi: x / Bacteria: x        RADIOLOGY & ADDITIONAL TESTS:    < from: MR Pelvis Bony Only w/wo IV Cont (04.11.25 @ 15:53) >    ACC: [id number] EXAM:  MR PELVIS BONY ONLY WAW IC   ORDERED BY: FELICIANO CISNEROS     PROCEDURE DATE:  04/11/2025          INTERPRETATION:  MR PELVIS BONY WITHOUT AND WITH IV CONTRAST    HISTORY: Evaluate for osteomyelitis. Right iliac activity on recent bone   scan.    TECHNIQUE:  Multiplanar MRI of the pelvis was performed without and with   10 cc administered Gadavist intravenous contrast.    COMPARISON:  Nuclear medicine bone scan dated 4/8/2025. CT abdomen and   pelvis dated 4/3/2025.    FINDINGS:    OSSEOUS STRUCTURES    Fractures/Stress Reactions:  None.    Osteomyelitis: None.    VISUALIZED SPINE  S1 is transitional and lumbarized. Moderate L5-S1 degenerative disc   disease.    PELVIC JOINTS    Sacroiliac Joints:  Preserved.    Symphysis Pubis:  Preserved.    RIGHT HIP JOINT    Avascular Necrosis:  None.    Articular Cartilage:  Grossly preserved given the large field of view.    Effusion/Synovitis:  Small effusion with mild synovitis.    RIGHT HIP TENDONS    Gluteus Minimus Tendon:  Intact.    Gluteus Medius Tendon:  Mild to moderate tendinopathy.    Iliopsoas Tendon:  Intact.    Common Hamstring Tendon:  Intact.    Bursa:  None.    LEFT HIP JOINT    Avascular Necrosis:  None.    Articular Cartilage:  Grossly preserved given the large field of view.    Effusion/Synovitis:  Small effusion with mild synovitis.    LEFT HIP TENDONS    Gluteus Minimus Tendon:  Intact.    Gluteus Medius Tendon:  Mild to moderate tendinopathy.    Iliopsoas Tendon:  Intact.    Common Hamstring Tendon:  Intact.    Bursa:  None.    SOFT TISSUES    Pelvis/Abdomen:  Moderate fecal distention of the visualized sigmoid   colon.    Musculature:  Moderate to severe generalized edema of the bilateral   gluteal musculature. Mild lower paraspinal, adductor, and quadriceps   muscle edema. Sacral decubitus wound with region of decreased enhancement   of the right gluteus zurdo measuring up to 11.9 cm transversely. Tract   with small amount of fluid and gas is again seen extending superiorly   within the right gluteal subcutaneous tissues to the level of L5.    Subcutaneous Tissues:  Mild gluteal edema around the sacral wound with   the tract extending superiorly to the level of L5.    NEUROVASCULAR STRUCTURES    Sacral Neuroforamina:  Widely patent.    Neural Plexuses:  Maintained.    IMPRESSION:  1.  No osteomyelitis.  2.  Sacral decubitus wound is again seen extending superiorly along the   superficial margin of the right gluteus zurdo to the level of L5 with   small amount of fluid and gas within the tract.  3.  The underlying gluteus zurdo demonstrates a region of decreased   enhancement consistent with ischemia or necrosis measuring up to 11.9 cm   transversely.  4.  Muscle edema suggests a nonspecific myositis.  5.  Small bilateral hip joint effusions with mild synovitis is   nonspecific.    --- End of Report ---      < end of copied text >  < from: NM Inflammatory Loc Wholebody WBC, Single Day (04.08.25 @ 15:12) >  ACC: [id number] EXAM:  NM MULTI DAY PROCEDURE   ORDERED BY: CRYSTAL PATEL     ACC: [id number] EXAM:  NM INFLAMM LOC WBC WB SD   ORDERED BY: CRYSTAL PATEL     PROCEDURE DATE:  04/08/2025          INTERPRETATION:  HISTORY/REASON FOR EXAM: 39-year-old male with shortness   of breath, lower extremity edema and fungemia. Referred for evaluation of   focal infection.    RADIOPHARMACEUTICAL: 530 uCi In-111 labeled autologous leukocytes.    TECHNIQUE: Approximately 18-20 hours following administration of 0.553    mCi In-111 labeled autologous leukocytes, whole body imaging in anterior   and posterior views was performed. Additional static images of both   femurs and both feet were obtained.  SPECT of the pelvis was planned but   could not be performed due to technical limitations to accommodate   patient's habitus and only anterior and posterior static images of the   pelvis could be obtained.    COMPARISON: CT chest, abdomen and pelvis 04/03/2025.    SCINTIGRAPHIC FINDINGS: Increased activity could be seen in the right   right side of the pelvis more intense on posterior image but could also   be seen on the anterior image indicating a considerable area of soft   tissue infection. There is also slightly more intense activity in the   right iliac bone compared to the left raising possibility of   osteomyelitis.    Intense activity around the lateral aspect of the right forefoot   compatible with soft tissue infection, presence of complicating   osteomyelitis is not excluded. Milder activity in the medial aspect of   the left forefoot may reflect soft tissue infection. Please correlate   findings with clinical examination.    No focal abnormal activity in the lungs that is suggestive of pneumonia.    IMPRESSION:  1. Large area of soft tissue infection in the right side of the pelvis as   detailed above. Slightly more intense activity in the right iliac bone   compared to the left raises possibility of osteomyelitis.    2. Intense activity in the lateral aspect of the right forefoot   compatible with soft tissue infection; complicating osteomyelitis is not   excluded. Milder activity in the medial aspect of the left forefoot may   reflect soft tissue infection. Please correlate findings with clinical   examination.    3.No evidence of pneumonia.    --- End of Report ---      < end of copied text >  < from: CT Abdomen and Pelvis w/ IV Cont (04.03.25 @ 21:42) >  ACC: [id number] EXAM:  CT ABDOMEN AND PELVIS IC   ORDERED BY: SHAUNA ABDI     ACC: [id number] EXAM:  CT CHEST IC   ORDERED BY: SHAUNA ABDI     PROCEDURE DATE:  04/03/2025          INTERPRETATION:  CLINICAL INFORMATION: Fever and leukocytosis. Evaluated   sacral wound for osteomyelitis.    COMPARISON: CT chest abdomen pelvis 3/17/2023    CONTRAST/COMPLICATIONS:  IV Contrast: Omnipaque 350  140 cc administered   60 cc discarded  Oral Contrast: NONE.    PROCEDURE:  CT of the Chest, Abdomen and Pelvis was performed.  Sagittal and coronal reformats were performed.    FINDINGS:  CHEST:  LUNGS AND LARGE AIRWAYS: Patent central airways. Bilateral lung patchy   opacities and lower lung consolidation, mildly decreased.  PLEURA: No pleural effusion.  VESSELS: Within normal limits.  HEART: Heart size is normal. No pericardial effusion.  MEDIASTINUM AND KENDRA: No lymphadenopathy.  CHEST WALL AND LOWER NECK: Within normal limits.    ABDOMEN AND PELVIS:  LIVER: Hepatomegaly.  BILE DUCTS: Normal caliber.  GALLBLADDER: Within normal limits.  SPLEEN: Within normal limits.  PANCREAS: Within normal limits.  ADRENALS: Within normal limits.  KIDNEYS/URETERS: Within normal limits.    BLADDER: Within normal limits.  REPRODUCTIVE ORGANS: Prostate within normal limits.    BOWEL: Gastrostomy tube in the stomach. No bowel obstruction. Appendix is   normal.  PERITONEUM/RETROPERITONEUM: Within normal limits.  VESSELS: Within normal limits.  LYMPH NODES: No lymphadenopathy.  ABDOMINAL WALL: Small fat containing umbilical hernia. Limited   field-of-view of the sacral soft tissues due to body habitus. Visualized   soft tissues demonstrating subcutaneous fat stranding and small droplets   of air. No drainable fluid collection is seen.  BONES: Mild degenerative changes.    IMPRESSION:  Bilateral lung patchy opacities and consolidation, mildly decreased from   prior exam.    Limited field-of-view of the sacral soft tissues due to body habitus.   Visualized soft tissues demonstrating subcutaneous fat stranding and   smalldroplets of air. No drainable fluid collection is seen.    --- End of Report ---    < end of copied text >      Imaging Personally Reviewed:    Consultant(s) Notes Reviewed:      Care Discussed with Consultants/Other Providers:

## 2025-04-22 LAB
ANION GAP SERPL CALC-SCNC: 14 MMOL/L — SIGNIFICANT CHANGE UP (ref 7–14)
BUN SERPL-MCNC: 13 MG/DL — SIGNIFICANT CHANGE UP (ref 7–23)
CALCIUM SERPL-MCNC: 9.8 MG/DL — SIGNIFICANT CHANGE UP (ref 8.4–10.5)
CHLORIDE SERPL-SCNC: 97 MMOL/L — LOW (ref 98–107)
CO2 SERPL-SCNC: 27 MMOL/L — SIGNIFICANT CHANGE UP (ref 22–31)
CREAT SERPL-MCNC: 1.42 MG/DL — HIGH (ref 0.5–1.3)
EGFR: 65 ML/MIN/1.73M2 — SIGNIFICANT CHANGE UP
EGFR: 65 ML/MIN/1.73M2 — SIGNIFICANT CHANGE UP
GLUCOSE BLDC GLUCOMTR-MCNC: 105 MG/DL — HIGH (ref 70–99)
GLUCOSE BLDC GLUCOMTR-MCNC: 110 MG/DL — HIGH (ref 70–99)
GLUCOSE BLDC GLUCOMTR-MCNC: 111 MG/DL — HIGH (ref 70–99)
GLUCOSE BLDC GLUCOMTR-MCNC: 95 MG/DL — SIGNIFICANT CHANGE UP (ref 70–99)
GLUCOSE SERPL-MCNC: 101 MG/DL — HIGH (ref 70–99)
HCT VFR BLD CALC: 26.9 % — LOW (ref 39–50)
HGB BLD-MCNC: 8.5 G/DL — LOW (ref 13–17)
MAGNESIUM SERPL-MCNC: 1.7 MG/DL — SIGNIFICANT CHANGE UP (ref 1.6–2.6)
MCHC RBC-ENTMCNC: 28.1 PG — SIGNIFICANT CHANGE UP (ref 27–34)
MCHC RBC-ENTMCNC: 31.6 G/DL — LOW (ref 32–36)
MCV RBC AUTO: 89.1 FL — SIGNIFICANT CHANGE UP (ref 80–100)
NRBC # BLD AUTO: 0 K/UL — SIGNIFICANT CHANGE UP (ref 0–0)
NRBC # FLD: 0 K/UL — SIGNIFICANT CHANGE UP (ref 0–0)
NRBC BLD AUTO-RTO: 0 /100 WBCS — SIGNIFICANT CHANGE UP (ref 0–0)
PHOSPHATE SERPL-MCNC: 5 MG/DL — HIGH (ref 2.5–4.5)
PLATELET # BLD AUTO: 358 K/UL — SIGNIFICANT CHANGE UP (ref 150–400)
POTASSIUM SERPL-MCNC: 3.6 MMOL/L — SIGNIFICANT CHANGE UP (ref 3.5–5.3)
POTASSIUM SERPL-SCNC: 3.6 MMOL/L — SIGNIFICANT CHANGE UP (ref 3.5–5.3)
RBC # BLD: 3.02 M/UL — LOW (ref 4.2–5.8)
RBC # FLD: 22.5 % — HIGH (ref 10.3–14.5)
SODIUM SERPL-SCNC: 138 MMOL/L — SIGNIFICANT CHANGE UP (ref 135–145)
WBC # BLD: 8.3 K/UL — SIGNIFICANT CHANGE UP (ref 3.8–10.5)
WBC # FLD AUTO: 8.3 K/UL — SIGNIFICANT CHANGE UP (ref 3.8–10.5)

## 2025-04-22 PROCEDURE — G0545: CPT

## 2025-04-22 PROCEDURE — 99232 SBSQ HOSP IP/OBS MODERATE 35: CPT

## 2025-04-22 PROCEDURE — 99233 SBSQ HOSP IP/OBS HIGH 50: CPT

## 2025-04-22 RX ORDER — NALOXEGOL OXALATE 12.5 MG/1
25 TABLET, FILM COATED ORAL DAILY
Refills: 0 | Status: DISCONTINUED | OUTPATIENT
Start: 2025-04-22 | End: 2025-04-25

## 2025-04-22 RX ORDER — GABAPENTIN 400 MG/1
1200 CAPSULE ORAL EVERY 8 HOURS
Refills: 0 | Status: DISCONTINUED | OUTPATIENT
Start: 2025-04-22 | End: 2025-04-25

## 2025-04-22 RX ADMIN — Medication 25 GRAM(S): at 05:17

## 2025-04-22 RX ADMIN — NALOXEGOL OXALATE 25 MILLIGRAM(S): 12.5 TABLET, FILM COATED ORAL at 12:17

## 2025-04-22 RX ADMIN — SEVELAMER HYDROCHLORIDE 800 MILLIGRAM(S): 800 TABLET ORAL at 18:15

## 2025-04-22 RX ADMIN — OXYCODONE HYDROCHLORIDE 5 MILLIGRAM(S): 30 TABLET ORAL at 18:14

## 2025-04-22 RX ADMIN — SODIUM HYPOCHLORITE 1 APPLICATION(S): 0.12 SOLUTION TOPICAL at 18:21

## 2025-04-22 RX ADMIN — OXYCODONE HYDROCHLORIDE 15 MILLIGRAM(S): 30 TABLET ORAL at 12:17

## 2025-04-22 RX ADMIN — DULOXETINE 60 MILLIGRAM(S): 20 CAPSULE, DELAYED RELEASE ORAL at 12:17

## 2025-04-22 RX ADMIN — CLOTRIMAZOLE 1 APPLICATION(S): 1 CREAM TOPICAL at 05:19

## 2025-04-22 RX ADMIN — Medication 1 APPLICATION(S): at 12:15

## 2025-04-22 RX ADMIN — MUPIROCIN CALCIUM 1 APPLICATION(S): 20 CREAM TOPICAL at 18:15

## 2025-04-22 RX ADMIN — Medication 1 TABLET(S): at 12:19

## 2025-04-22 RX ADMIN — Medication 10 MILLIGRAM(S): at 22:07

## 2025-04-22 RX ADMIN — Medication 40 MILLIGRAM(S): at 09:43

## 2025-04-22 RX ADMIN — OXYCODONE HYDROCHLORIDE 15 MILLIGRAM(S): 30 TABLET ORAL at 19:59

## 2025-04-22 RX ADMIN — AMLODIPINE BESYLATE 10 MILLIGRAM(S): 10 TABLET ORAL at 05:17

## 2025-04-22 RX ADMIN — MUPIROCIN CALCIUM 1 APPLICATION(S): 20 CREAM TOPICAL at 05:17

## 2025-04-22 RX ADMIN — GABAPENTIN 1200 MILLIGRAM(S): 400 CAPSULE ORAL at 22:06

## 2025-04-22 RX ADMIN — OXYCODONE HYDROCHLORIDE 15 MILLIGRAM(S): 30 TABLET ORAL at 05:16

## 2025-04-22 RX ADMIN — POLYETHYLENE GLYCOL 3350 17 GRAM(S): 17 POWDER, FOR SOLUTION ORAL at 12:20

## 2025-04-22 RX ADMIN — Medication 25 GRAM(S): at 22:06

## 2025-04-22 RX ADMIN — Medication 1 APPLICATION(S): at 18:14

## 2025-04-22 RX ADMIN — Medication 1 APPLICATION(S): at 05:19

## 2025-04-22 RX ADMIN — GABAPENTIN 900 MILLIGRAM(S): 400 CAPSULE ORAL at 05:16

## 2025-04-22 RX ADMIN — APIXABAN 5 MILLIGRAM(S): 2.5 TABLET, FILM COATED ORAL at 05:16

## 2025-04-22 RX ADMIN — Medication 25 GRAM(S): at 14:48

## 2025-04-22 RX ADMIN — APIXABAN 5 MILLIGRAM(S): 2.5 TABLET, FILM COATED ORAL at 18:21

## 2025-04-22 RX ADMIN — Medication 3 MILLIGRAM(S): at 22:06

## 2025-04-22 RX ADMIN — SEVELAMER HYDROCHLORIDE 800 MILLIGRAM(S): 800 TABLET ORAL at 09:42

## 2025-04-22 RX ADMIN — CLOTRIMAZOLE 1 APPLICATION(S): 1 CREAM TOPICAL at 18:14

## 2025-04-22 RX ADMIN — GABAPENTIN 1200 MILLIGRAM(S): 400 CAPSULE ORAL at 14:49

## 2025-04-22 RX ADMIN — Medication 1 APPLICATION(S): at 05:18

## 2025-04-22 RX ADMIN — QUETIAPINE FUMARATE 25 MILLIGRAM(S): 25 TABLET ORAL at 22:06

## 2025-04-22 RX ADMIN — SEVELAMER HYDROCHLORIDE 800 MILLIGRAM(S): 800 TABLET ORAL at 12:18

## 2025-04-22 RX ADMIN — SODIUM HYPOCHLORITE 1 APPLICATION(S): 0.12 SOLUTION TOPICAL at 05:19

## 2025-04-22 NOTE — PROGRESS NOTE ADULT - SUBJECTIVE AND OBJECTIVE BOX
Patient is a 39y old  Male who presents with a chief complaint of sob (02 Mar 2025 06:19)      SUBJECTIVE / OVERNIGHT EVENTS:    MEDICATIONS  (STANDING):  amLODIPine   Tablet 10 milliGRAM(s) Oral daily  apixaban 5 milliGRAM(s) Oral every 12 hours  bisacodyl 10 milliGRAM(s) Oral at bedtime  capsaicin HP 0.075% Cream 1 Application(s) Topical four times a day  chlorhexidine 2% Cloths 1 Application(s) Topical <User Schedule>  clotrimazole 1% Cream 1 Application(s) Topical two times a day  Dakins Solution - 1/4 Strength 1 Application(s) Topical two times a day  dextrose 50% Injectable 25 Gram(s) IV Push once  dextrose 50% Injectable 12.5 Gram(s) IV Push once  dextrose 50% Injectable 25 Gram(s) IV Push once  DULoxetine 60 milliGRAM(s) Oral daily  gabapentin 900 milliGRAM(s) Oral every 8 hours  glucagon  Injectable 1 milliGRAM(s) IntraMuscular once  insulin lispro (ADMELOG) corrective regimen sliding scale   SubCutaneous at bedtime  insulin lispro (ADMELOG) corrective regimen sliding scale   SubCutaneous three times a day before meals  lidocaine   4% Patch 1 Patch Transdermal daily  lidocaine   4% Patch 1 Patch Transdermal daily  melatonin 3 milliGRAM(s) Oral at bedtime  multivitamin 1 Tablet(s) Oral daily  mupirocin 2% Ointment 1 Application(s) Topical two times a day  pantoprazole    Tablet 40 milliGRAM(s) Oral before breakfast  piperacillin/tazobactam IVPB.. 3.375 Gram(s) IV Intermittent every 8 hours  polyethylene glycol 3350 17 Gram(s) Oral daily  QUEtiapine 25 milliGRAM(s) Oral at bedtime  sevelamer carbonate 800 milliGRAM(s) Oral three times a day with meals    MEDICATIONS  (PRN):  acetaminophen     Tablet .. 650 milliGRAM(s) Oral every 6 hours PRN Temp greater or equal to 38C (100.4F), Mild Pain (1 - 3), Moderate Pain (4 - 6)  albuterol/ipratropium for Nebulization 3 milliLiter(s) Nebulizer every 6 hours PRN Shortness of Breath and/or Wheezing  cyclobenzaprine 5 milliGRAM(s) Oral three times a day PRN Muscle Spasm  dextrose Oral Gel 15 Gram(s) Oral once PRN Blood Glucose LESS THAN 70 milliGRAM(s)/deciliter  oxyCODONE    IR 5 milliGRAM(s) Oral two times a day PRN Severe Pain (7 - 10) related to dressing change  oxyCODONE    IR 15 milliGRAM(s) Oral every 6 hours PRN Severe Pain (7 - 10)        CAPILLARY BLOOD GLUCOSE      POCT Blood Glucose.: 111 mg/dL (22 Apr 2025 08:50)  POCT Blood Glucose.: 106 mg/dL (21 Apr 2025 21:08)  POCT Blood Glucose.: 115 mg/dL (21 Apr 2025 16:33)  POCT Blood Glucose.: 114 mg/dL (21 Apr 2025 11:27)    I&O's Summary    21 Apr 2025 07:01  -  22 Apr 2025 07:00  --------------------------------------------------------  IN: 0 mL / OUT: 2700 mL / NET: -2700 mL        PHYSICAL EXAM:  GENERAL: NAD, morbidly obese  HEAD:  Atraumatic, Normocephalic  EYES: EOMI, PERRLA, conjunctiva and sclera clear  NECK: Supple, No JVD  CHEST/LUNG: Clear to auscultation bilaterally; diminished BS at bases, No wheeze  HEART: Regular rate and rhythm; S1 S2 present  ABDOMEN: Soft, Nontender, Nondistended; Bowel sounds present  EXTREMITIES:  2+ Peripheral Pulses, Scant b/l LE edema. Extremities warm to touch  PSYCH: AAOx3  NEUROLOGY: non-focal  SKIN: Refer to RN assessment note    LABS:                        8.5    8.30  )-----------( 358      ( 22 Apr 2025 05:10 )             26.9     04-22    138  |  97[L]  |  13  ----------------------------<  101[H]  3.6   |  27  |  1.42[H]    Ca    9.8      22 Apr 2025 05:10  Phos  5.0     04-22  Mg     1.70     04-22            Urinalysis Basic - ( 22 Apr 2025 05:10 )    Color: x / Appearance: x / SG: x / pH: x  Gluc: 101 mg/dL / Ketone: x  / Bili: x / Urobili: x   Blood: x / Protein: x / Nitrite: x   Leuk Esterase: x / RBC: x / WBC x   Sq Epi: x / Non Sq Epi: x / Bacteria: x        RADIOLOGY & ADDITIONAL TESTS:    < from: MR Pelvis Bony Only w/wo IV Cont (04.11.25 @ 15:53) >  ACC: [id number] EXAM:  MR PELVIS BONY ONLY WAW IC   ORDERED BY: FELICIANO CISNEROS     PROCEDURE DATE:  04/11/2025          INTERPRETATION:  MR PELVIS BONY WITHOUT AND WITH IV CONTRAST    HISTORY: Evaluate for osteomyelitis. Right iliac activity on recent bone   scan.    TECHNIQUE:  Multiplanar MRI of the pelvis was performed without and with   10 cc administered Gadavist intravenous contrast.    COMPARISON:  Nuclear medicine bone scan dated 4/8/2025. CT abdomen and   pelvis dated 4/3/2025.    FINDINGS:    OSSEOUS STRUCTURES    Fractures/Stress Reactions:  None.    Osteomyelitis: None.    VISUALIZED SPINE  S1 is transitional and lumbarized. Moderate L5-S1 degenerative disc   disease.    PELVIC JOINTS    Sacroiliac Joints:  Preserved.    Symphysis Pubis:  Preserved.    RIGHT HIP JOINT    Avascular Necrosis:  None.    Articular Cartilage:  Grossly preserved given the large field of view.    Effusion/Synovitis:  Small effusion with mild synovitis.    RIGHT HIP TENDONS    Gluteus Minimus Tendon:  Intact.    Gluteus Medius Tendon:  Mild to moderate tendinopathy.    Iliopsoas Tendon:  Intact.    Common Hamstring Tendon:  Intact.    Bursa:  None.    LEFT HIP JOINT    Avascular Necrosis:  None.    Articular Cartilage:  Grossly preserved given the large field of view.    Effusion/Synovitis:  Small effusion with mild synovitis.    LEFT HIP TENDONS    Gluteus Minimus Tendon:  Intact.    Gluteus Medius Tendon:  Mild to moderate tendinopathy.    Iliopsoas Tendon:  Intact.    Common Hamstring Tendon:  Intact.    Bursa:  None.    SOFT TISSUES    Pelvis/Abdomen:  Moderate fecal distention of the visualized sigmoid   colon.    Musculature:  Moderate to severe generalized edema of the bilateral   gluteal musculature. Mild lower paraspinal, adductor, and quadriceps   muscle edema. Sacral decubitus wound with region of decreased enhancement   of the right gluteus zurdo measuring up to 11.9 cm transversely. Tract   with small amount of fluid and gas is again seen extending superiorly   within the right gluteal subcutaneous tissues to the level of L5.    Subcutaneous Tissues:  Mild gluteal edema around the sacral wound with   the tract extending superiorly to the level of L5.    NEUROVASCULAR STRUCTURES    Sacral Neuroforamina:  Widely patent.    Neural Plexuses:  Maintained.    IMPRESSION:  1.  No osteomyelitis.  2.  Sacral decubitus wound is again seen extending superiorly along the   superficial margin of the right gluteus zurdo to the level of L5 with   small amount of fluid and gas within the tract.  3.  The underlying gluteus zurdo demonstrates a region of decreased   enhancement consistent with ischemia or necrosis measuring up to 11.9 cm   transversely.  4.  Muscle edema suggests a nonspecific myositis.  5.  Small bilateral hip joint effusions with mild synovitis is   nonspecific.    --- End of Report ---    < end of copied text >  < from: NM Inflammatory Loc Wholebody WBC, Single Day (04.08.25 @ 15:12) >  ACC: [id number] EXAM:  NM MULTI DAY PROCEDURE   ORDERED BY: CRYSTAL PATEL     ACC: [id number] EXAM:  NM INFLAMM LOC WBC WB SD   ORDERED BY: CRYSTAL PATEL     PROCEDURE DATE:  04/08/2025          INTERPRETATION:  HISTORY/REASON FOR EXAM: 39-year-old male with shortness   of breath, lower extremity edema and fungemia. Referred for evaluation of   focal infection.    RADIOPHARMACEUTICAL: 530 uCi In-111 labeled autologous leukocytes.    TECHNIQUE: Approximately 18-20 hours following administration of 0.553    mCi In-111 labeled autologous leukocytes, whole body imaging in anterior   and posterior views was performed. Additional static images of both   femurs and both feet were obtained.  SPECT of the pelvis was planned but   could not be performed due to technical limitations to accommodate   patient's habitus and only anterior and posterior static images of the   pelvis could be obtained.    COMPARISON: CT chest, abdomen and pelvis 04/03/2025.    SCINTIGRAPHIC FINDINGS: Increased activity could be seen in the right   right side of the pelvis more intense on posterior image but could also   be seen on the anterior image indicating a considerable area of soft   tissue infection. There is also slightly more intense activity in the   right iliac bone compared to the left raising possibility of   osteomyelitis.    Intense activity around the lateral aspect of the right forefoot   compatible with soft tissue infection, presence of complicating   osteomyelitis is not excluded. Milder activity in the medial aspect of   the left forefoot may reflect soft tissue infection. Please correlate   findings with clinical examination.    No focal abnormal activity in the lungs that is suggestive of pneumonia.    IMPRESSION:  1. Large area of soft tissue infection in the right side of the pelvis as   detailed above. Slightly more intense activity in the right iliac bone   compared to the left raises possibility of osteomyelitis.    2. Intense activity in the lateral aspect of the right forefoot   compatible with soft tissue infection; complicating osteomyelitis is not   excluded. Milder activity in the medial aspect of the left forefoot may   reflect soft tissue infection. Please correlate findings with clinical   examination.    3.No evidence of pneumonia.    --- End of Report ---    < end of copied text >  < from: CT Abdomen and Pelvis w/ IV Cont (04.03.25 @ 21:42) >  ACC: [id number] EXAM:  CT ABDOMEN AND PELVIS IC   ORDERED BY: SHAUNA ABDI     ACC: [id number] EXAM:  CT CHEST IC   ORDERED BY: SHAUNA ABDI     PROCEDURE DATE:  04/03/2025          INTERPRETATION:  CLINICAL INFORMATION: Fever and leukocytosis. Evaluated   sacral wound for osteomyelitis.    COMPARISON: CT chest abdomen pelvis 3/17/2023    CONTRAST/COMPLICATIONS:  IV Contrast: Omnipaque 350  140 cc administered   60 cc discarded  Oral Contrast: NONE.    PROCEDURE:  CT of the Chest, Abdomen and Pelvis was performed.  Sagittal and coronal reformats were performed.    FINDINGS:  CHEST:  LUNGS AND LARGE AIRWAYS: Patent central airways. Bilateral lung patchy   opacities and lower lung consolidation, mildly decreased.  PLEURA: No pleural effusion.  VESSELS: Within normal limits.  HEART: Heart size is normal. No pericardial effusion.  MEDIASTINUM AND KENDRA: No lymphadenopathy.  CHEST WALL AND LOWER NECK: Within normal limits.    ABDOMEN AND PELVIS:  LIVER: Hepatomegaly.  BILE DUCTS: Normal caliber.  GALLBLADDER: Within normal limits.  SPLEEN: Within normal limits.  PANCREAS: Within normal limits.  ADRENALS: Within normal limits.  KIDNEYS/URETERS: Within normal limits.    BLADDER: Within normal limits.  REPRODUCTIVE ORGANS: Prostate within normal limits.    BOWEL: Gastrostomy tube in the stomach. No bowel obstruction. Appendix is   normal.  PERITONEUM/RETROPERITONEUM: Within normal limits.  VESSELS: Within normal limits.  LYMPH NODES: No lymphadenopathy.  ABDOMINAL WALL: Small fat containing umbilical hernia. Limited   field-of-view of the sacral soft tissues due to body habitus. Visualized   soft tissues demonstrating subcutaneous fat stranding and small droplets   of air. No drainable fluid collection is seen.  BONES: Mild degenerative changes.    IMPRESSION:  Bilateral lung patchy opacities and consolidation, mildly decreased from   prior exam.    Limited field-of-view of the sacral soft tissues due to body habitus.   Visualized soft tissues demonstrating subcutaneous fat stranding and   smalldroplets of air. No drainable fluid collection is seen.    --- End of Report ---    < end of copied text >      Imaging Personally Reviewed:    Consultant(s) Notes Reviewed:      Care Discussed with Consultants/Other Providers:   Patient is a 39y old  Male who presents with a chief complaint of sob (02 Mar 2025 06:19)      SUBJECTIVE / OVERNIGHT EVENTS: No acute overnight events. Pt reports his pain better controlled with increase in his pain meds.    MEDICATIONS  (STANDING):  amLODIPine   Tablet 10 milliGRAM(s) Oral daily  apixaban 5 milliGRAM(s) Oral every 12 hours  bisacodyl 10 milliGRAM(s) Oral at bedtime  capsaicin HP 0.075% Cream 1 Application(s) Topical four times a day  chlorhexidine 2% Cloths 1 Application(s) Topical <User Schedule>  clotrimazole 1% Cream 1 Application(s) Topical two times a day  Dakins Solution - 1/4 Strength 1 Application(s) Topical two times a day  dextrose 50% Injectable 25 Gram(s) IV Push once  dextrose 50% Injectable 12.5 Gram(s) IV Push once  dextrose 50% Injectable 25 Gram(s) IV Push once  DULoxetine 60 milliGRAM(s) Oral daily  gabapentin 900 milliGRAM(s) Oral every 8 hours  glucagon  Injectable 1 milliGRAM(s) IntraMuscular once  insulin lispro (ADMELOG) corrective regimen sliding scale   SubCutaneous at bedtime  insulin lispro (ADMELOG) corrective regimen sliding scale   SubCutaneous three times a day before meals  lidocaine   4% Patch 1 Patch Transdermal daily  lidocaine   4% Patch 1 Patch Transdermal daily  melatonin 3 milliGRAM(s) Oral at bedtime  multivitamin 1 Tablet(s) Oral daily  mupirocin 2% Ointment 1 Application(s) Topical two times a day  pantoprazole    Tablet 40 milliGRAM(s) Oral before breakfast  piperacillin/tazobactam IVPB.. 3.375 Gram(s) IV Intermittent every 8 hours  polyethylene glycol 3350 17 Gram(s) Oral daily  QUEtiapine 25 milliGRAM(s) Oral at bedtime  sevelamer carbonate 800 milliGRAM(s) Oral three times a day with meals    MEDICATIONS  (PRN):  acetaminophen     Tablet .. 650 milliGRAM(s) Oral every 6 hours PRN Temp greater or equal to 38C (100.4F), Mild Pain (1 - 3), Moderate Pain (4 - 6)  albuterol/ipratropium for Nebulization 3 milliLiter(s) Nebulizer every 6 hours PRN Shortness of Breath and/or Wheezing  cyclobenzaprine 5 milliGRAM(s) Oral three times a day PRN Muscle Spasm  dextrose Oral Gel 15 Gram(s) Oral once PRN Blood Glucose LESS THAN 70 milliGRAM(s)/deciliter  oxyCODONE    IR 5 milliGRAM(s) Oral two times a day PRN Severe Pain (7 - 10) related to dressing change  oxyCODONE    IR 15 milliGRAM(s) Oral every 6 hours PRN Severe Pain (7 - 10)        CAPILLARY BLOOD GLUCOSE      POCT Blood Glucose.: 111 mg/dL (22 Apr 2025 08:50)  POCT Blood Glucose.: 106 mg/dL (21 Apr 2025 21:08)  POCT Blood Glucose.: 115 mg/dL (21 Apr 2025 16:33)  POCT Blood Glucose.: 114 mg/dL (21 Apr 2025 11:27)    I&O's Summary    21 Apr 2025 07:01  -  22 Apr 2025 07:00  --------------------------------------------------------  IN: 0 mL / OUT: 2700 mL / NET: -2700 mL        PHYSICAL EXAM:  GENERAL: NAD, morbidly obese  HEAD:  Atraumatic, Normocephalic  EYES: EOMI, PERRLA, conjunctiva and sclera clear  NECK: Supple, No JVD  CHEST/LUNG: Clear to auscultation bilaterally; diminished BS at bases, No wheeze  HEART: Regular rate and rhythm; S1 S2 present  ABDOMEN: Soft, Nontender, Nondistended; Bowel sounds present  EXTREMITIES:  2+ Peripheral Pulses, Scant b/l LE edema. Extremities warm to touch  PSYCH: AAOx3  NEUROLOGY: non-focal  SKIN: Refer to RN & Pod assessment note    LABS:                        8.5    8.30  )-----------( 358      ( 22 Apr 2025 05:10 )             26.9     04-22    138  |  97[L]  |  13  ----------------------------<  101[H]  3.6   |  27  |  1.42[H]    Ca    9.8      22 Apr 2025 05:10  Phos  5.0     04-22  Mg     1.70     04-22            Urinalysis Basic - ( 22 Apr 2025 05:10 )    Color: x / Appearance: x / SG: x / pH: x  Gluc: 101 mg/dL / Ketone: x  / Bili: x / Urobili: x   Blood: x / Protein: x / Nitrite: x   Leuk Esterase: x / RBC: x / WBC x   Sq Epi: x / Non Sq Epi: x / Bacteria: x        RADIOLOGY & ADDITIONAL TESTS:    < from: MR Pelvis Bony Only w/wo IV Cont (04.11.25 @ 15:53) >  ACC: [id number] EXAM:  MR PELVIS BONY ONLY WAW IC   ORDERED BY: FELICIANO CISNEROS     PROCEDURE DATE:  04/11/2025          INTERPRETATION:  MR PELVIS BONY WITHOUT AND WITH IV CONTRAST    HISTORY: Evaluate for osteomyelitis. Right iliac activity on recent bone   scan.    TECHNIQUE:  Multiplanar MRI of the pelvis was performed without and with   10 cc administered Gadavist intravenous contrast.    COMPARISON:  Nuclear medicine bone scan dated 4/8/2025. CT abdomen and   pelvis dated 4/3/2025.    FINDINGS:    OSSEOUS STRUCTURES    Fractures/Stress Reactions:  None.    Osteomyelitis: None.    VISUALIZED SPINE  S1 is transitional and lumbarized. Moderate L5-S1 degenerative disc   disease.    PELVIC JOINTS    Sacroiliac Joints:  Preserved.    Symphysis Pubis:  Preserved.    RIGHT HIP JOINT    Avascular Necrosis:  None.    Articular Cartilage:  Grossly preserved given the large field of view.    Effusion/Synovitis:  Small effusion with mild synovitis.    RIGHT HIP TENDONS    Gluteus Minimus Tendon:  Intact.    Gluteus Medius Tendon:  Mild to moderate tendinopathy.    Iliopsoas Tendon:  Intact.    Common Hamstring Tendon:  Intact.    Bursa:  None.    LEFT HIP JOINT    Avascular Necrosis:  None.    Articular Cartilage:  Grossly preserved given the large field of view.    Effusion/Synovitis:  Small effusion with mild synovitis.    LEFT HIP TENDONS    Gluteus Minimus Tendon:  Intact.    Gluteus Medius Tendon:  Mild to moderate tendinopathy.    Iliopsoas Tendon:  Intact.    Common Hamstring Tendon:  Intact.    Bursa:  None.    SOFT TISSUES    Pelvis/Abdomen:  Moderate fecal distention of the visualized sigmoid   colon.    Musculature:  Moderate to severe generalized edema of the bilateral   gluteal musculature. Mild lower paraspinal, adductor, and quadriceps   muscle edema. Sacral decubitus wound with region of decreased enhancement   of the right gluteus zurdo measuring up to 11.9 cm transversely. Tract   with small amount of fluid and gas is again seen extending superiorly   within the right gluteal subcutaneous tissues to the level of L5.    Subcutaneous Tissues:  Mild gluteal edema around the sacral wound with   the tract extending superiorly to the level of L5.    NEUROVASCULAR STRUCTURES    Sacral Neuroforamina:  Widely patent.    Neural Plexuses:  Maintained.    IMPRESSION:  1.  No osteomyelitis.  2.  Sacral decubitus wound is again seen extending superiorly along the   superficial margin of the right gluteus zurdo to the level of L5 with   small amount of fluid and gas within the tract.  3.  The underlying gluteus zurdo demonstrates a region of decreased   enhancement consistent with ischemia or necrosis measuring up to 11.9 cm   transversely.  4.  Muscle edema suggests a nonspecific myositis.  5.  Small bilateral hip joint effusions with mild synovitis is   nonspecific.    --- End of Report ---    < end of copied text >  < from: NM Inflammatory Loc Wholebody WBC, Single Day (04.08.25 @ 15:12) >  ACC: [id number] EXAM:  NM MULTI DAY PROCEDURE   ORDERED BY: CRYSTAL PATEL     ACC: [id number] EXAM:  NM INFLAMM LOC WBC WB SD   ORDERED BY: CRSYTAL PATEL     PROCEDURE DATE:  04/08/2025          INTERPRETATION:  HISTORY/REASON FOR EXAM: 39-year-old male with shortness   of breath, lower extremity edema and fungemia. Referred for evaluation of   focal infection.    RADIOPHARMACEUTICAL: 530 uCi In-111 labeled autologous leukocytes.    TECHNIQUE: Approximately 18-20 hours following administration of 0.553    mCi In-111 labeled autologous leukocytes, whole body imaging in anterior   and posterior views was performed. Additional static images of both   femurs and both feet were obtained.  SPECT of the pelvis was planned but   could not be performed due to technical limitations to accommodate   patient's habitus and only anterior and posterior static images of the   pelvis could be obtained.    COMPARISON: CT chest, abdomen and pelvis 04/03/2025.    SCINTIGRAPHIC FINDINGS: Increased activity could be seen in the right   right side of the pelvis more intense on posterior image but could also   be seen on the anterior image indicating a considerable area of soft   tissue infection. There is also slightly more intense activity in the   right iliac bone compared to the left raising possibility of   osteomyelitis.    Intense activity around the lateral aspect of the right forefoot   compatible with soft tissue infection, presence of complicating   osteomyelitis is not excluded. Milder activity in the medial aspect of   the left forefoot may reflect soft tissue infection. Please correlate   findings with clinical examination.    No focal abnormal activity in the lungs that is suggestive of pneumonia.    IMPRESSION:  1. Large area of soft tissue infection in the right side of the pelvis as   detailed above. Slightly more intense activity in the right iliac bone   compared to the left raises possibility of osteomyelitis.    2. Intense activity in the lateral aspect of the right forefoot   compatible with soft tissue infection; complicating osteomyelitis is not   excluded. Milder activity in the medial aspect of the left forefoot may   reflect soft tissue infection. Please correlate findings with clinical   examination.    3.No evidence of pneumonia.    --- End of Report ---    < end of copied text >  < from: CT Abdomen and Pelvis w/ IV Cont (04.03.25 @ 21:42) >  ACC: [id number] EXAM:  CT ABDOMEN AND PELVIS IC   ORDERED BY: SHAUNA ABDI     ACC: [id number] EXAM:  CT CHEST IC   ORDERED BY: SHAUNA ABDI     PROCEDURE DATE:  04/03/2025          INTERPRETATION:  CLINICAL INFORMATION: Fever and leukocytosis. Evaluated   sacral wound for osteomyelitis.    COMPARISON: CT chest abdomen pelvis 3/17/2023    CONTRAST/COMPLICATIONS:  IV Contrast: Omnipaque 350  140 cc administered   60 cc discarded  Oral Contrast: NONE.    PROCEDURE:  CT of the Chest, Abdomen and Pelvis was performed.  Sagittal and coronal reformats were performed.    FINDINGS:  CHEST:  LUNGS AND LARGE AIRWAYS: Patent central airways. Bilateral lung patchy   opacities and lower lung consolidation, mildly decreased.  PLEURA: No pleural effusion.  VESSELS: Within normal limits.  HEART: Heart size is normal. No pericardial effusion.  MEDIASTINUM AND KENDRA: No lymphadenopathy.  CHEST WALL AND LOWER NECK: Within normal limits.    ABDOMEN AND PELVIS:  LIVER: Hepatomegaly.  BILE DUCTS: Normal caliber.  GALLBLADDER: Within normal limits.  SPLEEN: Within normal limits.  PANCREAS: Within normal limits.  ADRENALS: Within normal limits.  KIDNEYS/URETERS: Within normal limits.    BLADDER: Within normal limits.  REPRODUCTIVE ORGANS: Prostate within normal limits.    BOWEL: Gastrostomy tube in the stomach. No bowel obstruction. Appendix is   normal.  PERITONEUM/RETROPERITONEUM: Within normal limits.  VESSELS: Within normal limits.  LYMPH NODES: No lymphadenopathy.  ABDOMINAL WALL: Small fat containing umbilical hernia. Limited   field-of-view of the sacral soft tissues due to body habitus. Visualized   soft tissues demonstrating subcutaneous fat stranding and small droplets   of air. No drainable fluid collection is seen.  BONES: Mild degenerative changes.    IMPRESSION:  Bilateral lung patchy opacities and consolidation, mildly decreased from   prior exam.    Limited field-of-view of the sacral soft tissues due to body habitus.   Visualized soft tissues demonstrating subcutaneous fat stranding and   smalldroplets of air. No drainable fluid collection is seen.    --- End of Report ---    < end of copied text >      Imaging Personally Reviewed:    Consultant(s) Notes Reviewed:      Care Discussed with Consultants/Other Providers:

## 2025-04-22 NOTE — PROGRESS NOTE ADULT - ASSESSMENT
40 YO M with PMHx of morbid obesity, BEA on CPAP, pAFIB (not on AC), HTN, and BL papilledema attributed to pseudotumor cerebri who presented initially to [facility] on 2/24 with SOB and BL LE edema. Found with URI outpatient with progressive SOB, and concern for noted for MFPNA on imaging. Course progressed with AHRF with worsening O2 demand, respiratory distress, secretions, and somnolence requiring intubation (difficult with success after 6 attempts) in ED and admission to [facility] MICU. While in MICU, patient noted with increasing O2 demand with no improvement despite optimal vent management. Concern noted for progressive ARDS, unable to prone given morbid obesity, and ultimately cannulated for vvECMO on 2/25 and transferred to [facility] for further management on 2/26. While at [facility], tx with diuresis and ABX, and ultimately decannulated and s/p 60XLTCP trach and PEG on 3/7. Patient ultimately transferred to [facility] on 3/11 and course complicated by recurrent high grade fevers and found with fungemia.    NEUROLOGY  # Hx of Pseudotumor Cerebri   - s/p LP in 2024 with high opening pressure  - s/p MRI 2024 with possible pituitary mass but unclear because of pressure effect from pseudotumor cerebri causing partially empty sella.  - Prolactin elevated   - ACTH low but received steroids during admission   - FT4 low normal and TSH normal, but given FT4 low normal TSH should be higher   - Discuss Endocrine consult for in house work up vs outpatient.     # Sedation   - Weaned off sedation in ICU   - Nimbex turned off 2/27, Prop turned off 3/9   - Precedex turned off and catapres started 3/11  - catapres 0.2 TID (decreased 3/25) - tapered down 3/31 to 0.1 tid - discontinued 4/3     # Insomnia   - Patient worked night shift x 15 years   - Trouble sleeping at night leading to day time sleepiness and poor participation with PT  - Continue on Seroquel 25 QHS (increased 3/18) & Melatonin    - Continue on Neurontin as below    # BL LE neuropathy   - CIPN concern   - Pain mgt called to consult - recs for Tylenol ATC, Neurontin, Oxycodone, Dilaudid for BREAKTHROUGH pain only, Lidocaine patches on each leg, ice prn to feet  - Some improvement in pain - PT consult for TENS,   - pt with significant pain at buttocks area indurated /tender CT ordered -no abscess  - Gabapentin up titrated to 900mg po TID, switched Dilaudid po to Oxycodone 10mg po Q6H PRN moderate-severe pain and IV Dilaudid PRN severe breakthrough and with dressing changes   - Neurontin increased to 800mg q8h Monitor neuro status/ lethargy   - I have reviewed with pt to not wait to long for dose of pain medication to  prevent pain from escalating   - Pain management reconsulted, recs to stop Dilaudid PO ATC , s/p Dilaudid IV for breakthrough pain with dressing changes changed to Oxy 5mg PRN BID, increased Gabapentin to 900 mg Q8, increased Cymbalta to 60 mg QD and Flexeril 5mg Q8  - Will do trial of capsaicin bid with strict instructions to avoid open /broken irritated skin/ and bandaged areas     CARDIOVASCULAR  # HTN   - HTN noted in ICU requiring Cardene and esmolol GTTs   - Lisinopril dc'ed to increase Catapres   - Continue on Norvasc 10, catapres weaned off 4/3  - Monitor BP     # AFIB   - Hx of pAFIB   - No RVR episodes or pAFIB episodes in ICU   - No rate control medications needed  - Monitor on telemetry     # RV dilation second to PHTN   - POCUS on arrival to [facility] with RVE concern   - TTE 10/2024 with EF 55-60 with normal LVSF, moderate LVH and normal RVSF and size with no concern for pHTN   - TTE on 2/25 at  with EF 61 and normal LVSF, but enlarged RV size with reduced RVSF, mild TR, mild PHTN with PASP 49, and dilated IVC to 3.4cn, but normal TAPSE 2.1.  - Continue on aggressive diuresis in ICU    - TTE 3/11 with RVSF reduced with TAPSE 1.6. IVC improved to 2.12cm.   - Lasix 40 PO QD discontinued 4/7 secondary to ELLA  - Monitor IO/ BMP    RESPIRATORY  # ARDS second to MFPNA   - Hx of BEA on CPAP presented to  post URI with SOB and found with AHRF with progression to ARDS second to post viral MFPNA   - Intubated 2/25   - Cannulated for vvECMO 2/26   - s/p 60XLTCP tracheostomy 3/7   - Decannulated 3/7   - CTSx removed tracheostomy and ECMO sutures on 3/17   - Continue on TC QD with PMV as able with strong phonation  - Continue on nebs and HTS   -  trials continue and now trialing overnight with BIPAP for OHS (10/8/12)  - Continue on PS 18/10/40 QHS given OHS   - Decannulated 3/28   - c/w nasal cannula 4L     GI  # Dysphagia   - s/p PEG 3/7   - Attempted green dye on 3/13 and failed  - Continue on TF  - Continue on Miralax and Senna  - RPT green dye passed 3/19  - 3/20 Passed FEEST - on soft bite sized with thin liquids Added consistent carb 2/2 weight/Elevated A1c   - 4/15 PEG removed in OR DSD over site     # Mild transaminitis   - LFTs elevated in ICU with TBILI elevated likely from ECMO hemolysis   - US ABD with hepatic steatosis   - Trend LFTs      RENAL  # ELLA   - ELLA likely from cardiorenal with RVE   - Diuresed in ICU and improved   - Monitor renal function and UOP   - Recurrent ELLA noted suspected in s/o vancomycin toxicity (4/7)   - UA negative and Urine Studies FeNa 0.9 % suggest pre-renal etiology  - Lasix discontinued for now  - s/p IV fluid trial on 4/8 with minimal improvement  - Trialing additional fluids on 4/10   - Trend BMP and I/Os    # Hypernatremia (resolved)  - Hypernatremia with fever spikes   - Fever curve improved and attempting to wean FWF   - now off FWF    INFECTIOUS DISEASE  # Recurrent fevers with fungemia from unknown source   - Recurrent fevers post ECMO decannulation thought initially to be cytokine surge   - BCx, SCx, UCx, RVP and MRSA RPT on 3/12 negative   - Completed zosyn (3/12-3/18) empirically with improved WBC , however recurrent fevers noted.   - RPT SCx, UA, CXR and RVP negative  - TTE 3/17 with no IE  - PanCT 3/17 with PNA and persistent panniculitis  - BCx 3/15 and 3/16 with candida albicans, rpt BCx 3/18 and 3/20 negative   - Concern for possible source from panniculitis   - Continue on Caspofungin 70 mg daily 3/17 - 4/15, ID consult appreciated   - 3/30 spiked temp cx's sent + ua and c/s pending Bcx neg to date - started on vanco /zosyn ID following  - Vanco stopped (4/7) due to ELLA concern for Vanco toxicty and continued on empiric Zosyn/ Caspofungin   - 4/3 - persistent fevers, leukocytosis. Will repeat CT C/A/P to evaluate for source - no source found - Bilat lung opacities mildly decreased     - 4/8 bone scan: : "1. Large area of soft tissue infection in the R side of the pelvis as detailed above. Slightly more intense activity in the R iliac bone compared to the L raises possibility of OM. 2. Intense activity in the lateral aspect of the R forefoot compatible with soft tissue infection; complicating osteomyelitis is not excluded. Milder activity in the medial aspect of the L forefoot may reflect soft tissue infection."  - ID consulted recs to continue on Zosyn 3.375 g 8, plan on 6 week course for possible OM until 5/11 and s/p Caspofungin 70 mg daily, which ended 4/15  - MR Pelvis Bony (4/11): No osteomyelitis. Sacral decubitus wound is again seen extending superiorly along the superficial margin of the right gluteus zurdo to the level of L5 with small amount of fluid and gas within the tract. The underlying gluteus zurdo demonstrates a region of decreased enhancement consistent with ischemia or necrosis measuring up to 11.9 cm transversely. Muscle edema suggests a nonspecific myositis. Small bilateral hip joint effusions with mild synovitis is nonspecific.  - S/P sacral wound debridement with cultures growing Providencia and Bacteroides (4/15)  - Caspofungin finished 4/15    # MFPNA and abdominal pannus cellulitis   - Presented to  post URI with SOB and found with AHRF with progression to ARDS second to post viral MFPNA   - RVP, legionella, strept, BAL, BCx, UCx, HIV, PCP, and adenovirus PCR negative   - Initially started on multiple agents in ICU and ultimately completed zosyn (2/26-3/9) ZMAX (2/25-2/26) and Vanco (2/26-3/1)     # R/o Sacral OM  - Noted with hard indurated sacral wound by Wound Care Team  - CT Pelvis (3/29) revealing superficial skin thickening overlying the sacrum with underlying edema of the subcutaneous fat, in keeping with the clinical history of sacral wound. No evidence of underlying tract or drainable fluid collection.  - CT 4/3 - no fluid collection in sacral tissue   - Given ongoing fever with no clear source with obtain MRI Pelvis per ID recs to r/o sacral OM once renal fx improves  - S/P sacral wound debridement with cultures growing Providencia and Bacteroides (4/15)    # Penile discharge   - GC/ chlamydia negative, HIV negative   - Monitor for now    # PPX   - MRSA PCR positive and completed Bactroban (2/26-3/3)     HEME   # Anemia likely second to ECMO circuit vs AOCD   - HH low in ICU second to ECMO circuit   - HH dropping again today however no bleeding noted   - Microcytic and hemochromic, sent anemia panel 3/19  - Trend H/H     VASCULAR   # R IJ and BL LE DVTs   - Post ECMO dopplers on 3/9 negative for BL UE/ LE DVTs.   - Subsequent post ECMO dopplers performed on 3/14 and noted with acute, non-occlusive deep vein thrombosis noted within the right internal jugular vein. Acute, occlusive deep vein thromboses noted within the right and left peroneal veins at both mid calves, and age-indeterminate, occlusive deep vein thrombosis visualized within the left gastrocnemius vein at the proximal calf.     - Continue on argatroban GTT and changed to Eliquis   - S/P heparin gtt prior to PEG removal now transitioned back to Eliquis (4/17)    PODIATRY   # BL plantar feet blisters likely pressors induced  - Seen by podiatry and blisters lanced on 3/4 and again on 3/18  - Continue on local wound care per podiatry   - Podiatry following- oK for WBAT will fu with PT for offloading shoe if appropriate  - Podiatry F/U 3/28 / 4/3 done /following     ENDOCRINE  # Pre-DM2   - A1C 6.7%  - Continue on ISS, Monitor FS   - IF no demand then stop ISS     LINES/ TUBES  - R IJ and R FEM ECMO (2/26-3/7)     SKIN  # Pannus canndial intertrigo   # Sacrum/ buttock MAD  - Continue on clotrimazole cream   - WOC appreciated   - Seen by WC pt noted to have oozing from Buttock wound surgicel placed by wound care On follow up no further bleeding  - Wound care placed orders   - No bleeding from wound     ETHICS/ GOC    - FULL CODE     DISPO - PT following  Seen by PM&R for acute rehab per recs  ***************  4/19-no new events  4/20-no new issues   40 YO M with PMHx of morbid obesity, BEA on CPAP, pAFIB (not on AC), HTN, and BL papilledema attributed to pseudotumor cerebri who presented initially to [facility] on 2/24 with SOB and BL LE edema. Found with URI outpatient with progressive SOB, and concern for noted for MFPNA on imaging. Course progressed with AHRF with worsening O2 demand, respiratory distress, secretions, and somnolence requiring intubation (difficult with success after 6 attempts) in ED and admission to [facility] MICU. While in MICU, patient noted with increasing O2 demand with no improvement despite optimal vent management. Concern noted for progressive ARDS, unable to prone given morbid obesity, and ultimately cannulated for vvECMO on 2/25 and transferred to [facility] for further management on 2/26. While at [facility], tx with diuresis and ABX, and ultimately decannulated and s/p 60XLTCP trach and PEG on 3/7. Patient ultimately transferred to [facility] on 3/11 and course complicated by recurrent high grade fevers and found with fungemia.    NEUROLOGY  # Hx of Pseudotumor Cerebri   - s/p LP in 2024 with high opening pressure  - s/p MRI 2024 with possible pituitary mass but unclear because of pressure effect from pseudotumor cerebri causing partially empty sella.  - Prolactin elevated   - ACTH low but received steroids during admission   - FT4 low normal and TSH normal, but given FT4 low normal TSH should be higher   - Discuss Endocrine consult for in house work up vs outpatient.     # Sedation   - Weaned off sedation in ICU   - Nimbex turned off 2/27, Prop turned off 3/9   - Precedex turned off and catapres started 3/11  - catapres 0.2 TID (decreased 3/25) - tapered down 3/31 to 0.1 tid - discontinued 4/3     # Insomnia   - Patient worked night shift x 15 years   - Trouble sleeping at night leading to day time sleepiness and poor participation with PT  - Continue on Seroquel 25 QHS (increased 3/18) & Melatonin    - Continue on Neurontin as below    # BL LE neuropathy   - CIPN concern   - Pain mgt called to consult - recs for Tylenol ATC, Neurontin, Oxycodone, Dilaudid for BREAKTHROUGH pain only, Lidocaine patches on each leg, ice prn to feet  - Some improvement in pain - PT consult for TENS,   - pt with significant pain at buttocks area indurated /tender CT ordered -no abscess  - Gabapentin up titrated to 900mg po TID, switched Dilaudid po to Oxycodone 10mg po Q6H PRN moderate-severe pain and IV Dilaudid PRN severe breakthrough and with dressing changes   - Neurontin increased to 800mg q8h Monitor neuro status/ lethargy   - I have reviewed with pt to not wait to long for dose of pain medication to  prevent pain from escalating   - Pain management reconsulted, recs to stop Dilaudid PO ATC , s/p Dilaudid IV for breakthrough pain with dressing changes changed to Oxy 5mg PRN BID, increased Gabapentin to 1200 mg Q8, increased Cymbalta to 60 mg QD and Flexeril 5mg Q8  - trial of Capsaicin bid with strict instructions to avoid open /broken irritated skin/ and bandaged areas     CARDIOVASCULAR  # HTN   - HTN noted in ICU requiring Cardene and esmolol GTTs   - Lisinopril dc'ed to increase Catapres   - Continue on Norvasc 10, catapres weaned off 4/3  - Monitor BP     # AFIB   - Hx of pAFIB   - No RVR episodes or pAFIB episodes in ICU   - No rate control medications needed  - Monitor on telemetry     # RV dilation second to PHTN   - POCUS on arrival to [facility] with RVE concern   - TTE 10/2024 with EF 55-60 with normal LVSF, moderate LVH and normal RVSF and size with no concern for pHTN   - TTE on 2/25 at  with EF 61 and normal LVSF, but enlarged RV size with reduced RVSF, mild TR, mild PHTN with PASP 49, and dilated IVC to 3.4cn, but normal TAPSE 2.1.  - Continue on aggressive diuresis in ICU    - TTE 3/11 with RVSF reduced with TAPSE 1.6. IVC improved to 2.12cm.   - Lasix 40 PO QD discontinued 4/7 secondary to ELLA  - Monitor IO/ BMP    RESPIRATORY  # ARDS second to MFPNA   - Hx of BEA on CPAP presented to  post URI with SOB and found with AHRF with progression to ARDS second to post viral MFPNA   - Intubated 2/25   - Cannulated for vvECMO 2/26   - s/p 60XLTCP tracheostomy 3/7   - Decannulated 3/7   - CTSx removed tracheostomy and ECMO sutures on 3/17   - Continue on TC QD with PMV as able with strong phonation  - Continue on nebs and HTS   -  trials continue and now trialing overnight with BIPAP for OHS (10/8/12)  - Continue on PS 18/10/40 QHS given OHS   - Decannulated 3/28   - now on room air off O2    GI  # Dysphagia   - s/p PEG 3/7   - Attempted green dye on 3/13 and failed  - Continue on TF  - Continue on Miralax and Senna  - RPT green dye passed 3/19  - 3/20 Passed FEEST - on soft bite sized with thin liquids Added consistent carb 2/2 weight/Elevated A1c   - 4/15 PEG removed in OR DSD over site     # Mild transaminitis   - LFTs elevated in ICU with TBILI elevated likely from ECMO hemolysis   - US ABD with hepatic steatosis   - Trend LFTs      RENAL  # ELLA   - ELLA likely from cardiorenal with RVE   - Diuresed in ICU and improved   - Monitor renal function and UOP   - Recurrent ELLA noted suspected in s/o vancomycin toxicity (4/7)   - UA negative and Urine Studies FeNa 0.9 % suggest pre-renal etiology  - Lasix discontinued for now  - s/p IV fluid trial on 4/8 & 4/10 with minimal improvement  - Trend BMP and I/Os    # Hypernatremia (resolved)  - Hypernatremia with fever spikes   - Fever curve improved and attempting to wean FWF   - now off FWF    INFECTIOUS DISEASE  # Recurrent fevers with fungemia from unknown source   - Recurrent fevers post ECMO decannulation thought initially to be cytokine surge   - BCx, SCx, UCx, RVP and MRSA RPT on 3/12 negative   - Completed zosyn (3/12-3/18) empirically with improved WBC , however recurrent fevers noted.   - RPT SCx, UA, CXR and RVP negative  - TTE 3/17 with no IE  - PanCT 3/17 with PNA and persistent panniculitis  - BCx 3/15 and 3/16 with candida albicans, rpt BCx 3/18 and 3/20 negative   - Concern for possible source from panniculitis   - Continue on Caspofungin 70 mg daily 3/17 - 4/15, ID consult appreciated   - 3/30 spiked temp cx's sent + ua and c/s pending Bcx neg to date - started on vanco /zosyn ID following  - Vanco stopped (4/7) due to ELLA concern for Vanco toxicty and continued on empiric Zosyn/ Caspofungin   - 4/3 - persistent fevers, leukocytosis. Will repeat CT C/A/P to evaluate for source - no source found - Bilat lung opacities mildly decreased     - 4/8 bone scan: : "1. Large area of soft tissue infection in the R side of the pelvis as detailed above. Slightly more intense activity in the R iliac bone compared to the L raises possibility of OM. 2. Intense activity in the lateral aspect of the R forefoot compatible with soft tissue infection; complicating osteomyelitis is not excluded. Milder activity in the medial aspect of the L forefoot may reflect soft tissue infection."  - ID consulted recs to continue on Zosyn 3.375 g 8, plan on 6 week course for possible OM until 5/11 and s/p Caspofungin 70 mg daily, which ended 4/15  - MR Pelvis Bony (4/11): No osteomyelitis. Sacral decubitus wound is again seen extending superiorly along the superficial margin of the right gluteus zurdo to the level of L5 with small amount of fluid and gas within the tract. The underlying gluteus zurdo demonstrates a region of decreased enhancement consistent with ischemia or necrosis measuring up to 11.9 cm transversely. Muscle edema suggests a nonspecific myositis. Small bilateral hip joint effusions with mild synovitis is nonspecific.  - S/P sacral wound debridement with cultures growing Providencia and Bacteroides (4/15)  - Caspofungin finished 4/15    # MFPNA and abdominal pannus cellulitis   - Presented to  post URI with SOB and found with AHRF with progression to ARDS second to post viral MFPNA   - RVP, legionella, strept, BAL, BCx, UCx, HIV, PCP, and adenovirus PCR negative   - Initially started on multiple agents in ICU and ultimately completed zosyn (2/26-3/9) ZMAX (2/25-2/26) and Vanco (2/26-3/1)     # R/o Sacral OM  - Noted with hard indurated sacral wound by Wound Care Team  - CT Pelvis (3/29) revealing superficial skin thickening overlying the sacrum with underlying edema of the subcutaneous fat, in keeping with the clinical history of sacral wound. No evidence of underlying tract or drainable fluid collection.  - CT 4/3 - no fluid collection in sacral tissue   - Given ongoing fever with no clear source with obtain MRI Pelvis per ID recs to r/o sacral OM once renal fx improves  - S/P sacral wound debridement with cultures growing Providencia and Bacteroides (4/15)    # Penile discharge   - GC/ chlamydia negative, HIV negative   - Monitor for now    # PPX   - MRSA PCR positive and completed Bactroban (2/26-3/3)     HEME   # Anemia likely second to ECMO circuit vs AOCD   - HH low in ICU second to ECMO circuit   - HH dropping again today however no bleeding noted   - Microcytic and hemochromic, sent anemia panel 3/19  - Trend H/H     VASCULAR   # R IJ and BL LE DVTs   - Post ECMO dopplers on 3/9 negative for BL UE/ LE DVTs.   - Subsequent post ECMO dopplers performed on 3/14 and noted with acute, non-occlusive deep vein thrombosis noted within the right internal jugular vein. Acute, occlusive deep vein thromboses noted within the right and left peroneal veins at both mid calves, and age-indeterminate, occlusive deep vein thrombosis visualized within the left gastrocnemius vein at the proximal calf.     - Continue on argatroban GTT and changed to Eliquis   - S/P heparin gtt prior to PEG removal now transitioned back to Eliquis (4/17)    PODIATRY   # BL plantar feet blisters likely pressors induced  - Seen by podiatry and blisters lanced on 3/4 and again on 3/18  - Continue on local wound care per podiatry   - Podiatry following- oK for WBAT will f/u with PT for offloading shoe if appropriate    ENDOCRINE  # Pre-DM2   - A1C 6.7%  - Continue on ISS, Monitor FS   - IF no demand then stop ISS     LINES/ TUBES  - R IJ and R FEM ECMO (2/26-3/7)     SKIN  # Pannus canndial intertrigo   # Sacrum/ buttock MAD  - Continue on clotrimazole cream   - WCRN appreciated -pt noted to have oozing from Buttock wound surgicel placed by wound care On follow up no further bleeding  - Wound care placed orders - No bleeding from wound, no signs of infection but still with deep tunneling as of 4/21. Will need outpt f/u for wound VAC     ETHICS/ GOC    - FULL CODE     DISPO - PT following  Seen by PM&R for acute rehab per recs goal is for acute rehab however has made limited progress with bedside therapy. Attempted standing for the first time today (4/22) however unable. Will monitor for improvement in pain and mobility  **************

## 2025-04-22 NOTE — PROGRESS NOTE ADULT - SUBJECTIVE AND OBJECTIVE BOX
Patient is a 39y old  Male who presents with a chief complaint of sob (02 Mar 2025 06:19)    38 y/o M w/ PMHx AF (not on AC), HTN, BEA, pseudotumor cerebri s/p LP in 2024, initially presented to [facility] on 2/24, SOB, LE edema with URI symptoms and sick contacts, noted to have severe ARDS, intubated however still hypoxia, cannulated for VV ECMO on 2/25, transferred to [facility] on 2/25, started on empiric Vanco, Zosyn for multifocal PNA, abdominal wall cellulitis, s/p trach placement by CT surgery on 3/7, ECMO decannulated on 3/7. Zosyn held on 3/9.  C/b fevers on 3/10, restarted on Zosyn, transferred to [facility] on 3/13   sacral ulcer with OM on WBC scan, candida albicans fungemia, multifocal pneumonia and abdominal wall cellulitis s/p vanco/zosyn.     REVIEW OF SYSTEMS  weakness  pain    PAST MEDICAL & SURGICAL HISTORY  HTN (hypertension)    Atrial fibrillation    Papilledema of both eyes    Chronic sinusitis    Morbid obesity      CURRENT FUNCTIONAL STATUS  4/22  Bed Mobility  Bed Mobility Training Rehab Potential: good, to achieve stated therapy goals  Bed Mobility Training Sit-to-Supine: minimum assist (75% patient effort);  1 person assist;  nonverbal cues (demo/gestures);  verbal cues;  set-up required;  hand over hand;  bed rails  Bed Mobility Training Supine-to-Sit: minimum assist (75% patient effort);  1 person assist;  nonverbal cues (demo/gestures);  verbal cues;  set-up required;  hand over hand;  bed rails  Bed Mobility Training Limitations: impaired ability to control trunk for mobility;  decreased ability to use legs for bridging/pushing;  decreased strength;  impaired balance;  impaired postural control    Sit-Stand Transfer Training  Sit-to-Stand Transfer Training Treatment not Performed: attempted but unable due to LE weakness, used the subha but unable to stand despite multiple attempts.     Therapeutic Exercise  Therapeutic Exercise Rehab Effort: good  Therapeutic Exercise Detail: Ther ex of LE included ankle pumps, knee extension, hip flexion. Pt instructed to perform as able throughout day. Pt verbalized understanding of therapeutic exercises.       RECENT LABS/IMAGING  CBC Full  -  ( 22 Apr 2025 05:10 )  WBC Count : 8.30 K/uL  RBC Count : 3.02 M/uL  Hemoglobin : 8.5 g/dL  Hematocrit : 26.9 %  Platelet Count - Automated : 358 K/uL  Mean Cell Volume : 89.1 fL  Mean Cell Hemoglobin : 28.1 pg  Mean Cell Hemoglobin Concentration : 31.6 g/dL  Auto Neutrophil # : x  Auto Lymphocyte # : x  Auto Monocyte # : x  Auto Eosinophil # : x  Auto Basophil # : x  Auto Neutrophil % : x  Auto Lymphocyte % : x  Auto Monocyte % : x  Auto Eosinophil % : x  Auto Basophil % : x    04-22    138  |  97[L]  |  13  ----------------------------<  101[H]  3.6   |  27  |  1.42[H]    Ca    9.8      22 Apr 2025 05:10  Phos  5.0     04-22  Mg     1.70     04-22      Urinalysis Basic - ( 22 Apr 2025 05:10 )    Color: x / Appearance: x / SG: x / pH: x  Gluc: 101 mg/dL / Ketone: x  / Bili: x / Urobili: x   Blood: x / Protein: x / Nitrite: x   Leuk Esterase: x / RBC: x / WBC x   Sq Epi: x / Non Sq Epi: x / Bacteria: x        VITALS  T(C): 36.2 (04-22-25 @ 08:00), Max: 36.8 (04-21-25 @ 18:20)  HR: 102 (04-22-25 @ 11:59) (99 - 124)  BP: 146/98 (04-22-25 @ 08:00) (142/93 - 157/105)  RR: 20 (04-22-25 @ 08:00) (18 - 20)  SpO2: 94% (04-22-25 @ 11:59) (92% - 100%)  Wt(kg): --    ALLERGIES  No Known Allergies      MEDICATIONS   acetaminophen     Tablet .. 650 milliGRAM(s) Oral every 6 hours PRN  albuterol/ipratropium for Nebulization 3 milliLiter(s) Nebulizer every 6 hours PRN  amLODIPine   Tablet 10 milliGRAM(s) Oral daily  apixaban 5 milliGRAM(s) Oral every 12 hours  bisacodyl 10 milliGRAM(s) Oral at bedtime  capsaicin HP 0.075% Cream 1 Application(s) Topical four times a day  chlorhexidine 2% Cloths 1 Application(s) Topical <User Schedule>  clotrimazole 1% Cream 1 Application(s) Topical two times a day  cyclobenzaprine 5 milliGRAM(s) Oral three times a day PRN  Dakins Solution - 1/4 Strength 1 Application(s) Topical two times a day  dextrose 50% Injectable 25 Gram(s) IV Push once  dextrose 50% Injectable 12.5 Gram(s) IV Push once  dextrose 50% Injectable 25 Gram(s) IV Push once  dextrose Oral Gel 15 Gram(s) Oral once PRN  DULoxetine 60 milliGRAM(s) Oral daily  gabapentin 1200 milliGRAM(s) Oral every 8 hours  glucagon  Injectable 1 milliGRAM(s) IntraMuscular once  insulin lispro (ADMELOG) corrective regimen sliding scale   SubCutaneous three times a day before meals  insulin lispro (ADMELOG) corrective regimen sliding scale   SubCutaneous at bedtime  lidocaine   4% Patch 1 Patch Transdermal daily  lidocaine   4% Patch 1 Patch Transdermal daily  melatonin 3 milliGRAM(s) Oral at bedtime  multivitamin 1 Tablet(s) Oral daily  mupirocin 2% Ointment 1 Application(s) Topical two times a day  naloxegol 25 milliGRAM(s) Oral daily  oxyCODONE    IR 5 milliGRAM(s) Oral two times a day PRN  oxyCODONE    IR 15 milliGRAM(s) Oral every 6 hours PRN  pantoprazole    Tablet 40 milliGRAM(s) Oral before breakfast  piperacillin/tazobactam IVPB.. 3.375 Gram(s) IV Intermittent every 8 hours  polyethylene glycol 3350 17 Gram(s) Oral daily  QUEtiapine 25 milliGRAM(s) Oral at bedtime  sevelamer carbonate 800 milliGRAM(s) Oral three times a day with meals      ----------------------------------------------------------------------------------------   PHYSICAL EXAM  Constitutional - NAD    Chest - no respiratory distress  Cardiovascular -mild tachy  Extremities - dressings b/l feet  Neurologic Exam -                    Cognitive - Awake, Alert, AAO to self, place, date, year, situation      Communication - fluent     Cranial Nerves - CN 2-12 intact     Motor -                     LEFT    UE - 4+/5                    RIGHT UE - 4+/5                    LEFT    LE -  2/5                      RIGHT LE -  2/4     Sensory - Intact to LT       Balance - WNL Static  Psychiatric - Mood stable, Affect WNL  ----------------------------------------------------------------------------------------  ASSESSMENT/PLAN  38 y/o M w/ PMHx AF (not on AC), HTN, BEA, pseudotumor cerebri s/p LP in 2024, initially presented to [facility] on 2/24, SOB, LE edema with URI symptoms and sick contacts, noted to have severe ARDS, intubated however still hypoxia, cannulated for VV ECMO on 2/25, transferred to [facility] on 2/25, started on empiric Vanco, Zosyn for multifocal PNA, abdominal wall cellulitis  OM on wbc scan  s/p trach and peg 3/7, since decannulated   continue bedside PT and OT     Pain - acetaminophen, oxy ir prn, gabapentin   DVT PPX - on eliquis  Rehab -  limited by pain, goal is for acute rehab however has made limited progress with bedside therapy. Attempted standing for the first time today however unable. will monitor for improvement in pain and mobility    Total time spent to review relevant records and imaging results, examine patient, complete documentation, and when applicable discuss the case with the patient, family, , social workers, and medical team: 35  minutes

## 2025-04-22 NOTE — PROGRESS NOTE ADULT - ASSESSMENT
This is a 40 y/o M w/ PMHx AF (not on AC), HTN, BEA, pseudotumor cerebri s/p LP in 2024, initially presented to [place] on 2/24, SOB, LE edema with URI symptoms and sick contacts, noted to have severe ARDS, intubated however still hypoxia, cannulated for VV ECMO on 2/25, transferred to [facility] on 2/25, started on empiric Vanco, Zosyn for multifocal PNA, abdominal wall cellulitis, s/p trach placement by CT surgery on 3/7, ECMO decannulated on 3/7. Zosyn held on 3/9.  C/b fevers on 3/10, restarted on Zosyn, transferred to [facility] on 3/13, Bcx now w/ yeast.    #Sacral ulcer, with extensive tunneling, no OM on MRI, however c/f OM on WBC scan  #Candida albicans fungemia   #Fevers   #Multifocal PNA, abdominal wall cellulitis s/p Vanco/Zosyn  #ARDS, hypoxic respiratory failure requiring VV EMCO 2/2 multifocal PNA? s/p decannulation on 3/7    Overall, 40 y/o M w/ PMHx AF (not on AC), HTN, BEA, pseudotumor cerebri s/p LP in 2024 admitted to [facility] on 2/26 for ARDS, hypoxic respiratory failure requiring VV EMCO 2/2 multifocal PNA? s/p decannulation on 3/7, c/b abdominal wall cellulitis s/p Vancomycin/Zosyn, s/p trach on 3/7, in the setting of persistent fevers despite Zosyn, BCx now w/ yeast, Candida albicans   Unclear source for yeast in BCx, pt had all central lines removed on 3/7, not getting TPN, no abdominal pain on exam, PEG site well appearing.     CT A/P w/o clear abdominal source of fungemia, possibly 2/2 abdominal wall cellulitis? TTE w/o IE.  BCx 3/18 1/2 positive, 3/20 NGTD x 2.     Pt with worsening sepsis on 3/30, febrile to 103, WBC 16.   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   Pt with deep tunneled wound to left, no drainage, malodor per wound care.   CT A/P w/ b/l pulmonary opacities but improved, no sacral abscess   WBC scan with soft tissue infection R side of pelvis, intense activity in R iliac bone possible OM. Lateral R forefoot with soft tissue infection, possible OM?  Wound examined with wound care, large R buttocks soft tissue ulcer, does not look infected, however deep tunneling with murky fluid, likely source of infection   MRI w/o OM, however given WBC scan findings, will still treat for 6 weeks     S/p PEG removal and sacral ulcer debridement, gram stain w/ providenciae, bacteroides     Recommendations;   1. Continue with Zosyn 3.375 g 8, plan on 6 week course for possible OM until 5/11.   2. S/p Caspofungin 70 mg daily, 4 week course ended 4/15    Thank you for consulting us and involving us in the management of this patient's case. In addition to reviewing history, imaging, documents, labs, microbiology, and infection control strategies and potential issues.     ID will continue to follow    Shailesh Owens M.D.  Attending Physician  Division of Infectious Diseases  Department of Medicine    Please contact through MS Teams message.  Office: [phone] (after 5 PM or weekend)

## 2025-04-22 NOTE — PROGRESS NOTE ADULT - SUBJECTIVE AND OBJECTIVE BOX
Infectious Diseases Follow Up:    Patient is a 39y old  Male who presents with a chief complaint of sob (02 Mar 2025 06:19)      Interval History/ROS:  No acute complaints     Allergies  No Known Allergies        ANTIMICROBIALS:  piperacillin/tazobactam IVPB.. 3.375 every 8 hours      Current Abx:     Previous Abx     OTHER MEDS:  MEDICATIONS  (STANDING):  acetaminophen     Tablet .. 650 every 6 hours PRN  albuterol/ipratropium for Nebulization 3 every 6 hours PRN  amLODIPine   Tablet 10 daily  apixaban 5 every 12 hours  bisacodyl 10 at bedtime  cyclobenzaprine 5 three times a day PRN  dextrose 50% Injectable 25 once  dextrose 50% Injectable 12.5 once  dextrose 50% Injectable 25 once  dextrose Oral Gel 15 once PRN  DULoxetine 60 daily  gabapentin 1200 every 8 hours  glucagon  Injectable 1 once  insulin lispro (ADMELOG) corrective regimen sliding scale  three times a day before meals  insulin lispro (ADMELOG) corrective regimen sliding scale  at bedtime  melatonin 3 at bedtime  naloxegol 25 daily  oxyCODONE    IR 5 two times a day PRN  oxyCODONE    IR 15 every 6 hours PRN  pantoprazole    Tablet 40 before breakfast  polyethylene glycol 3350 17 daily  QUEtiapine 25 at bedtime      Vital Signs Last 24 Hrs  T(C): 36.6 (22 Apr 2025 04:00), Max: 36.8 (21 Apr 2025 18:20)  T(F): 97.9 (22 Apr 2025 04:00), Max: 98.3 (22 Apr 2025 00:00)  HR: 102 (22 Apr 2025 11:59) (99 - 124)  BP: 157/105 (22 Apr 2025 04:00) (142/93 - 157/105)  BP(mean): 117 (22 Apr 2025 04:00) (108 - 117)  RR: 18 (22 Apr 2025 04:00) (18 - 18)  SpO2: 94% (22 Apr 2025 11:59) (93% - 100%)    Parameters below as of 22 Apr 2025 04:00  Patient On (Oxygen Delivery Method): room air        PHYSICAL EXAM:  GENERAL: NAD  HEAD:  Atraumatic, Normocephalic  EYES: EOMI, conjunctiva and sclera clear  CHEST/LUNG: On NC, CTAB  HEART: RRR  ABDOMEN: Soft, Nontender, Nondistended  Extremities: B/l feet wrapped   PSYCH: AAOx3                        8.5    8.30  )-----------( 358      ( 22 Apr 2025 05:10 )             26.9       04-22    138  |  97[L]  |  13  ----------------------------<  101[H]  3.6   |  27  |  1.42[H]    Ca    9.8      22 Apr 2025 05:10  Phos  5.0     04-22  Mg     1.70     04-22        Urinalysis Basic - ( 22 Apr 2025 05:10 )    Color: x / Appearance: x / SG: x / pH: x  Gluc: 101 mg/dL / Ketone: x  / Bili: x / Urobili: x   Blood: x / Protein: x / Nitrite: x   Leuk Esterase: x / RBC: x / WBC x   Sq Epi: x / Non Sq Epi: x / Bacteria: x        MICROBIOLOGY:  v  Tissue SACRAL WOUND AEBNDEMENT  04-15-25   Few Providencia stuartii  Rare Bacteroides vulgatus group "Susceptibilities not performed"  --  Providencia stuartii      Blood Blood-Venous  04-07-25   No growth at 5 days  --  --      Blood Blood-Peripheral  04-07-25   No growth at 5 days  --  --      Clean Catch Clean Catch (Midstream)  03-31-25   <10,000 CFU/mL Normal Urogenital Keysha  --  --      Blood Blood-Peripheral  03-30-25   No growth at 5 days  --  --                RADIOLOGY:

## 2025-04-22 NOTE — PROGRESS NOTE ADULT - NS ATTEND AMEND GEN_ALL_CORE FT
40 yo M w/ class III obesity, pAfib (no AC), HTN, BEA (on CPAP), history of b/l papilledema attributed to pseudotumor cerebri, who is transferred from [place] on 2/26 for VV ECMO 2/2 ARDS.     Patient transferred to [facility] for VV ECMO. S/P diiuresis, TTE/ROBERT with RV failure, s/p treatment of pneumonia. Patient s/p trach and peg. Course complicated by Fungemia s/p course of caspo, also with osteomyelitis s/p surgical debridement. Had critical illness polyneuropathy due to critical illness. With how neuropathic pain. Now decannulated trach and on PO diet.     # acute hypoxemic respiratory failure  # RV failure  # oropharyngeal dysphagia- resolved.   # Critical illness neuropathy  # afib  # DVT  # Osteomyelitis  # Fungemia  - ARDS s/p VV ecmo. Trach decannualted. On room air.   - OOB, ambulate as tolerated, aggressive PT  - Passes s/s eval, now on PO diet. Will keep peg in given recent placement. Will need outpatient f/u for possible removal.   - S/p course of antifungals. Pending continued zosyn for osteo s/p OR. C/W wound care.   - DVT noted on repeat duplex. C/W full ac   - C/W full a/c for now. Hx of afib, [id number] of 2  - Pain 2/2 to likely critical illness neuropathy. On gabapentin, cymbalta, flexiril. Pain consulted but given neuropathic less role for opioids   - C/W PO oxycodone, bowel regiment. WIll attempt to wean off IV Dilaudid for possible d/c planning. Increased gabapentin.   - DVT ppx- Full A/C  - Dispo- full code. Aggressive PT eval. OOB to chair.

## 2025-04-23 LAB
GLUCOSE BLDC GLUCOMTR-MCNC: 110 MG/DL — HIGH (ref 70–99)
GLUCOSE BLDC GLUCOMTR-MCNC: 116 MG/DL — HIGH (ref 70–99)
GLUCOSE BLDC GLUCOMTR-MCNC: 134 MG/DL — HIGH (ref 70–99)
GLUCOSE BLDC GLUCOMTR-MCNC: 97 MG/DL — SIGNIFICANT CHANGE UP (ref 70–99)

## 2025-04-23 PROCEDURE — 99232 SBSQ HOSP IP/OBS MODERATE 35: CPT

## 2025-04-23 RX ORDER — POLYETHYLENE GLYCOL 3350 17 G/17G
17 POWDER, FOR SOLUTION ORAL
Refills: 0 | Status: DISCONTINUED | OUTPATIENT
Start: 2025-04-23 | End: 2025-04-25

## 2025-04-23 RX ADMIN — GABAPENTIN 1200 MILLIGRAM(S): 400 CAPSULE ORAL at 21:15

## 2025-04-23 RX ADMIN — SODIUM HYPOCHLORITE 1 APPLICATION(S): 0.12 SOLUTION TOPICAL at 17:17

## 2025-04-23 RX ADMIN — QUETIAPINE FUMARATE 25 MILLIGRAM(S): 25 TABLET ORAL at 21:16

## 2025-04-23 RX ADMIN — GABAPENTIN 1200 MILLIGRAM(S): 400 CAPSULE ORAL at 14:56

## 2025-04-23 RX ADMIN — APIXABAN 5 MILLIGRAM(S): 2.5 TABLET, FILM COATED ORAL at 17:14

## 2025-04-23 RX ADMIN — DULOXETINE 60 MILLIGRAM(S): 20 CAPSULE, DELAYED RELEASE ORAL at 12:14

## 2025-04-23 RX ADMIN — APIXABAN 5 MILLIGRAM(S): 2.5 TABLET, FILM COATED ORAL at 05:43

## 2025-04-23 RX ADMIN — CYCLOBENZAPRINE HYDROCHLORIDE 5 MILLIGRAM(S): 15 CAPSULE, EXTENDED RELEASE ORAL at 12:15

## 2025-04-23 RX ADMIN — SEVELAMER HYDROCHLORIDE 800 MILLIGRAM(S): 800 TABLET ORAL at 12:14

## 2025-04-23 RX ADMIN — Medication 25 GRAM(S): at 14:57

## 2025-04-23 RX ADMIN — Medication 1 APPLICATION(S): at 05:44

## 2025-04-23 RX ADMIN — SEVELAMER HYDROCHLORIDE 800 MILLIGRAM(S): 800 TABLET ORAL at 17:12

## 2025-04-23 RX ADMIN — MUPIROCIN CALCIUM 1 APPLICATION(S): 20 CREAM TOPICAL at 05:43

## 2025-04-23 RX ADMIN — POLYETHYLENE GLYCOL 3350 17 GRAM(S): 17 POWDER, FOR SOLUTION ORAL at 17:17

## 2025-04-23 RX ADMIN — Medication 25 GRAM(S): at 05:41

## 2025-04-23 RX ADMIN — CLOTRIMAZOLE 1 APPLICATION(S): 1 CREAM TOPICAL at 05:44

## 2025-04-23 RX ADMIN — OXYCODONE HYDROCHLORIDE 15 MILLIGRAM(S): 30 TABLET ORAL at 22:30

## 2025-04-23 RX ADMIN — Medication 10 MILLIGRAM(S): at 21:15

## 2025-04-23 RX ADMIN — SEVELAMER HYDROCHLORIDE 800 MILLIGRAM(S): 800 TABLET ORAL at 08:48

## 2025-04-23 RX ADMIN — MUPIROCIN CALCIUM 1 APPLICATION(S): 20 CREAM TOPICAL at 17:13

## 2025-04-23 RX ADMIN — Medication 1 APPLICATION(S): at 23:55

## 2025-04-23 RX ADMIN — NALOXEGOL OXALATE 25 MILLIGRAM(S): 12.5 TABLET, FILM COATED ORAL at 12:21

## 2025-04-23 RX ADMIN — OXYCODONE HYDROCHLORIDE 15 MILLIGRAM(S): 30 TABLET ORAL at 15:31

## 2025-04-23 RX ADMIN — Medication 40 MILLIGRAM(S): at 05:41

## 2025-04-23 RX ADMIN — Medication 1 TABLET(S): at 12:15

## 2025-04-23 RX ADMIN — SODIUM HYPOCHLORITE 1 APPLICATION(S): 0.12 SOLUTION TOPICAL at 05:41

## 2025-04-23 RX ADMIN — Medication 1 APPLICATION(S): at 12:26

## 2025-04-23 RX ADMIN — GABAPENTIN 1200 MILLIGRAM(S): 400 CAPSULE ORAL at 05:42

## 2025-04-23 RX ADMIN — AMLODIPINE BESYLATE 10 MILLIGRAM(S): 10 TABLET ORAL at 05:42

## 2025-04-23 RX ADMIN — Medication 25 GRAM(S): at 21:16

## 2025-04-23 RX ADMIN — CLOTRIMAZOLE 1 APPLICATION(S): 1 CREAM TOPICAL at 17:13

## 2025-04-23 RX ADMIN — OXYCODONE HYDROCHLORIDE 15 MILLIGRAM(S): 30 TABLET ORAL at 08:48

## 2025-04-23 RX ADMIN — OXYCODONE HYDROCHLORIDE 5 MILLIGRAM(S): 30 TABLET ORAL at 01:38

## 2025-04-23 RX ADMIN — Medication 3 MILLIGRAM(S): at 21:16

## 2025-04-23 RX ADMIN — Medication 1 APPLICATION(S): at 01:39

## 2025-04-23 NOTE — PROGRESS NOTE ADULT - ASSESSMENT
38 YO M with PMHx of morbid obesity, BEA on CPAP, pAFIB (not on AC), HTN, and BL papilledema attributed to pseudotumor cerebri who presented initially to [facility] on 2/24 with SOB and BL LE edema. Found with URI outpatient with progressive SOB, and concern for noted for MFPNA on imaging. Course progressed with AHRF with worsening O2 demand, respiratory distress, secretions, and somnolence requiring intubation (difficult with success after 6 attempts) in ED and admission to [facility] MICU. While in MICU, patient noted with increasing O2 demand with no improvement despite optimal vent management. Concern noted for progressive ARDS, unable to prone given morbid obesity, and ultimately cannulated for vvECMO on 2/25 and transferred to [facility] for further management on 2/26. While at [facility], tx with diuresis and ABX, and ultimately decannulated and s/p 60XLTCP trach and PEG on 3/7. Patient ultimately transferred to RCU on 3/11 and course complicated by recurrent high grade fevers and found with fungemia likely from panniculitis. Course further complicated by progression of sacrum ulcer with possible OM and s/p surgical debridement. Lastly course complicated by BL feet pressor wound and CIPN. Pain medications continued and podiatry recc local wound care.     NEUROLOGY  # Hx of Pseudotumor Cerebri   - s/p LP in 2024 with high opening pressure  - s/p MRI 2024 with possible pituitary mass but unclear because of pressure effect from pseudotumor cerebri causing partially empty sella.  - Prolactin elevated   - ACTH low but received steroids during admission   - FT4 low normal and TSH normal, but given FT4 low normal TSH should be higher and concern for likely inadequate response.   - Endo work up outpatient.     # Sedation   - Weaned off sedation in ICU   - Nimbex turned off 2/27, Prop turned off 3/9   - Precedex turned off and catapres started 3/11  - Catapres weaned off on 4/3     # Insomnia   - Patient worked night shift x 15 years   - Trouble sleeping at night leading to day time sleepiness and poor participation with PT and continued on seroquel 25 QHS with improvement.     # BL LE neuropathy   - CIPN concern   - Continue on Oxy 5mg PRN BID  - Continue on Gabapentin to 1200mg TID  - Continue on Cymbalta 60 mg QD   - Continue on Flexeril 5mg Q8H PRN  - Pain management following     CARDIOVASCULAR  # HTN   - HTN noted in ICU requiring Cardene and esmolol GTTs   - Catapres turned off as above  - Continue on Norvasc 10   - Monitor BP     # AFIB   - Hx of pAFIB   - No RVR episodes or pAFIB episodes in ICU   - No rate control medications needed  - Monitor on telemetry     # RV dilation second to PHTN   - POCUS on arrival to [facility] with RVE concern   - TTE 10/2024 with EF 55-60 with normal LVSF, moderate LVH and normal RVSF and size with no concern for pHTN   - TTE on 2/25 at  with EF 61 and normal LVSF, but enlarged RV size with reduced RVSF, mild TR, mild PHTN with PASP 49, and dilated IVC to 3.4cn, but normal TAPSE 2.1.  - Continue on aggressive diuresis in ICU    - TTE 3/11 with RVSF reduced with TAPSE 1.6 and IVC improved to 2.12cm.   - Continue on lasix 40 PO QD , however dc'ed on 4/7 second to ELLA.   - Monitor volume status    RESPIRATORY  # ARDS second to MFPNA   - Hx of BEA on CPAP presented to  post URI with SOB and found with AHRF with progression to ARDS second to post viral MFPNA   - Intubated 2/25   - Cannulated for vvECMO 2/26   - s/p 60XLTCP tracheostomy 3/7   - s/p ECMO decannulation 3/7   - s/p trach decannulation on 3/28  - [id number] RA with BIPAP 18/10 QHS    GI  # Dysphagia   - s/p PEG 3/7   - s/p FEEST 3/20 and passed   - s/p PEG removal on 4/15     # Mild transaminitis   - LFTs elevated in ICU with TBILI elevated likely from ECMO hemolysis   - US ABD with hepatic steatosis   - Trend LFTs      RENAL  # ELLA   - ELLA likely from cardiorenal with RVE   - Diuresed in ICU and improved   - Monitor renal function and UOP   - Recurrent ELLA noted with suspected vancomycin toxicity. Lasix and vanco stopped and monitoring renal function and UOP.     # Hypernatremia   - Hypernatremia with fever spikes   - Fever curve improved and weaned off FWF    INFECTIOUS DISEASE  # Recurrent fever second to OM?   - Persistent fevers despite fungemia tx as below   - RPT panCT with no source   - Bone scan 4/8 with LARGE soft tissue infection in the R side of the pelvis and slightly more intense in the right iliac bone with concern for possible oM .   - MRI PELVIS 4/11 with large wound but no OM   - Case discussed at length with ID and sx and s/p surgical wound debridement on 4/15   - Wound cx with Providencia and Bacteroides   - Holding further vanco given ELLA and no MRSA  - Continue on zosyn through 5/11 for 6 week course     # Fungemia second to panniculitis   - Recurrent fevers noted.   - RPT SCx, UA, CXR and RVP negative  - TTE 3/17 with no IE  - PanCT 3/17 with PNA and persistent panniculitis  - BCx 3/15 and 3/16 with candida albicans  - RPT BCx 3/18 and 3/20 negative     # Recurrent fevers   - Recurrent fevers post ECMO decannulation thought initially to be cytokine surge   - BCx, SCx, UCx, RVP and MRSA on 3/12 negative   - Completed zosyn (3/12-3/18) empirically with improved WBC   - Completed caspo 3/15-4/15    # MFPNA and abdominal pannus cellulitis   - Presented to  post URI with SOB and found with AHRF with progression to ARDS second to post viral MFPNA   - RVP, legionella, strept, BAL, BCx, UCx, HIV, PCP, and adenovirus PCR negative   - Initially started on multiple agents in ICU and ultimately completed zosyn (2/26-3/9) ZMAX (2/25-2/26) and Vanco (2/26-3/1)     # Penile discharge   - GC/ chlamydia negative  - HIV negative   - Monitor for now    # PPX   - MRSA PCR positive and completed Bactroban (2/26-3/3)     HEME   # Anemia likely second to ECMO circuit vs AOCD   - HH low in ICU second to ECMO circuit   - Anemia panel with AOCD  - Trend HH     VASCULAR   # R IJ and BL LE DVTs   - Post ECMO dopplers on 3/9 negative for BL UE/ LE DVTs.   - Subsequent post ECMO dopplers performed on 3/14 and noted with acute, non-occlusive deep vein thrombosis noted within the right internal jugular vein. Acute, occlusive deep vein thromboses noted within the right and left peroneal veins at both mid calves, and age-indeterminate, occlusive deep vein thrombosis visualized within the left gastrocnemius vein at the proximal calf.     - s/p argatroban GTT   - Continue on Eliquis     PODIATRY   # BL plantar feet blisters likely pressors induced  - Seen by podiatry and blisters lanced on 3/4 and again on 3/18  - Continue on local wound care and WBAT per podiatry     ENDOCRINE  # Pre-DM2   - A1C 6.7%  - Continue on ISS, Monitor FS   - IF no demand then stop ISS     LINES/ TUBES  - R IJ and R FEM ECMO (2/26-3/7)     SKIN  # Pannus canndial intertrigo   - Continue on local wound care     # Sacrum/ buttock MAD  - Surgical debridement of sacrum on 4/15  - Deep tunnelling remains as of 4/21   - Continue on local wound care  - May need wound vacc outpatient    ETHICS/ GOC    - FULL CODE     DISPO - PT and PMR following and goal for acute  40 YO M with PMHx of morbid obesity, BEA on CPAP, pAFIB (not on AC), HTN, and BL papilledema attributed to pseudotumor cerebri who presented initially to [facility] on 2/24 with SOB and BL LE edema. Found with URI outpatient with progressive SOB, and concern for noted for MFPNA on imaging. Course progressed with AHRF with worsening O2 demand, respiratory distress, secretions, and somnolence requiring intubation (difficult with success after 6 attempts) in ED and admission to [facility] MICU. While in MICU, patient noted with increasing O2 demand with no improvement despite optimal vent management. Concern noted for progressive ARDS, unable to prone given morbid obesity, and ultimately cannulated for vvECMO on 2/25 and transferred to [facility] for further management on 2/26. While at [facility], tx with diuresis and ABX, and ultimately decannulated and s/p 60XLTCP trach and PEG on 3/7. Patient ultimately transferred to RCU on 3/11 and course complicated by recurrent high grade fevers and found with fungemia likely from panniculitis. Course further complicated by progression of sacrum ulcer with possible OM and s/p surgical debridement. Lastly course complicated by BL feet pressor wound and CIPN. Pain medications continued and podiatry recc local wound care.     NEUROLOGY  # Hx of Pseudotumor Cerebri   - s/p LP in 2024 with high opening pressure  - s/p MRI 2024 with possible pituitary mass but unclear because of pressure effect from pseudotumor cerebri causing partially empty sella.  - Prolactin elevated   - ACTH low but received steroids during admission   - FT4 low normal and TSH normal, but given FT4 low normal TSH should be higher and concern for likely inadequate response.   - Endo work up outpatient.     # Sedation   - Weaned off sedation in ICU   - Nimbex turned off 2/27, Prop turned off 3/9   - Precedex turned off and catapres started 3/11  - Catapres weaned off on 4/3     # Insomnia   - Patient worked night shift x 15 years   - Trouble sleeping at night leading to day time sleepiness and poor participation with PT and continued on seroquel 25 QHS with improvement.     # BL LE neuropathy   - CIPN concern   - Continue on Oxy 5mg PRN BID  - Continue on Gabapentin to 1200mg TID  - Continue on Cymbalta 60 mg QD   - Continue on Flexeril 5mg Q8H PRN  - Pain management following     CARDIOVASCULAR  # HTN   - HTN noted in ICU requiring Cardene and esmolol GTTs   - Catapres turned off as above  - Continue on Norvasc 10   - Monitor BP     # AFIB   - Hx of pAFIB   - No RVR episodes or pAFIB episodes in ICU   - No rate control medications needed  - Monitor on telemetry     # RV dilation second to PHTN   - POCUS on arrival to [facility] with RVE concern   - TTE 10/2024 with EF 55-60 with normal LVSF, moderate LVH and normal RVSF and size with no concern for pHTN   - TTE on 2/25 at  with EF 61 and normal LVSF, but enlarged RV size with reduced RVSF, mild TR, mild PHTN with PASP 49, and dilated IVC to 3.4cn, but normal TAPSE 2.1.  - Continue on aggressive diuresis in ICU    - TTE 3/11 with RVSF reduced with TAPSE 1.6 and IVC improved to 2.12cm.   - Continue on lasix 40 PO QD , however dc'ed on 4/7 second to ELLA.   - Monitor volume status    RESPIRATORY  # ARDS second to MFPNA   - Hx of BEA on CPAP presented to  post URI with SOB and found with AHRF with progression to ARDS second to post viral MFPNA   - Intubated 2/25   - Cannulated for vvECMO 2/26   - s/p 60XLTCP tracheostomy 3/7   - s/p ECMO decannulation 3/7   - s/p trach decannulation on 3/28  - [id number] RA with BIPAP 18/10 QHS    GI  # Dysphagia   - s/p PEG 3/7   - s/p FEEST 3/20 and passed   - s/p PEG removal on 4/15     # Constipation   - Continue on miralax BID   - Continue on movantik and dulcolax   - Monitor BMs    # Mild transaminitis   - LFTs elevated in ICU with TBILI elevated likely from ECMO hemolysis   - US ABD with hepatic steatosis   - Trend LFTs      RENAL  # ELLA   - ELLA likely from cardiorenal with RVE   - Diuresed in ICU and improved   - Monitor renal function and UOP   - Recurrent ELLA noted with suspected vancomycin toxicity. Lasix and vanco stopped and monitoring renal function and UOP.     # Hypernatremia   - Hypernatremia with fever spikes   - Fever curve improved and weaned off FWF    INFECTIOUS DISEASE  # Recurrent fever second to OM?   - Persistent fevers despite fungemia tx as below   - RPT panCT with no source   - Bone scan 4/8 with LARGE soft tissue infection in the R side of the pelvis and slightly more intense in the right iliac bone with concern for possible oM .   - MRI PELVIS 4/11 with large wound but no OM   - Case discussed at length with ID and sx and s/p surgical wound debridement on 4/15   - Wound cx with Providencia and Bacteroides   - Holding further vanco given ELLA and no MRSA  - Continue on zosyn through 5/11 for 6 week course     # Fungemia second to panniculitis   - Recurrent fevers noted.   - RPT SCx, UA, CXR and RVP negative  - TTE 3/17 with no IE  - PanCT 3/17 with PNA and persistent panniculitis  - BCx 3/15 and 3/16 with candida albicans  - RPT BCx 3/18 and 3/20 negative     # Recurrent fevers   - Recurrent fevers post ECMO decannulation thought initially to be cytokine surge   - BCx, SCx, UCx, RVP and MRSA on 3/12 negative   - Completed zosyn (3/12-3/18) empirically with improved WBC   - Completed caspo 3/15-4/15    # MFPNA and abdominal pannus cellulitis   - Presented to  post URI with SOB and found with AHRF with progression to ARDS second to post viral MFPNA   - RVP, legionella, strept, BAL, BCx, UCx, HIV, PCP, and adenovirus PCR negative   - Initially started on multiple agents in ICU and ultimately completed zosyn (2/26-3/9) ZMAX (2/25-2/26) and Vanco (2/26-3/1)     # Penile discharge   - GC/ chlamydia negative  - HIV negative   - Monitor for now    # PPX   - MRSA PCR positive and completed Bactroban (2/26-3/3)     HEME   # Anemia likely second to ECMO circuit vs AOCD   - HH low in ICU second to ECMO circuit   - Anemia panel with AOCD  - Trend HH     VASCULAR   # R IJ and BL LE DVTs   - Post ECMO dopplers on 3/9 negative for BL UE/ LE DVTs.   - Subsequent post ECMO dopplers performed on 3/14 and noted with acute, non-occlusive deep vein thrombosis noted within the right internal jugular vein. Acute, occlusive deep vein thromboses noted within the right and left peroneal veins at both mid calves, and age-indeterminate, occlusive deep vein thrombosis visualized within the left gastrocnemius vein at the proximal calf.     - s/p argatroban GTT   - Continue on Eliquis     PODIATRY   # BL plantar feet blisters likely pressors induced  - Seen by podiatry and blisters lanced on 3/4 and again on 3/18  - Continue on local wound care and WBAT per podiatry     ENDOCRINE  # Pre-DM2   - A1C 6.7%  - Continue on ISS, Monitor FS   - IF no demand then stop ISS     LINES/ TUBES  - R IJ and R FEM ECMO (2/26-3/7)     SKIN  # Pannus canndial intertrigo   - Continue on local wound care     # Sacrum/ buttock MAD  - Surgical debridement of sacrum on 4/15  - Deep tunnelling remains as of 4/21   - Continue on local wound care  - May need wound vacc outpatient    ETHICS/ GOC    - FULL CODE     DISPO - PT and PMR following and goal for acute  40 YO M with PMHx of morbid obesity, BEA on CPAP, pAFIB (not on AC), HTN, and BL papilledema attributed to pseudotumor cerebri who presented initially to [facility] on 2/24 with SOB and BL LE edema. Found with URI outpatient with progressive SOB, and concern for noted for MFPNA on imaging. Course progressed with AHRF with worsening O2 demand, respiratory distress, secretions, and somnolence requiring intubation (difficult with success after 6 attempts) in ED and admission to [facility] MICU. While in MICU, patient noted with increasing O2 demand with no improvement despite optimal vent management. Concern noted for progressive ARDS, unable to prone given morbid obesity, and ultimately cannulated for vvECMO on 2/25 and transferred to [facility] for further management on 2/26. While at [facility], tx with diuresis and ABX, and ultimately decannulated and s/p 60XLTCP trach and PEG on 3/7. Patient ultimately transferred to RCU on 3/11 and course complicated by recurrent high grade fevers and found with fungemia likely from panniculitis. Course further complicated by progression of sacrum ulcer with possible OM and s/p surgical debridement. Lastly course complicated by BL feet pressor wound and CIPN. Pain medications continued and podiatry recc local wound care.     NEUROLOGY  # Hx of Pseudotumor Cerebri   - s/p LP in 2024 with high opening pressure  - s/p MRI 2024 with possible pituitary mass but unclear because of pressure effect from pseudotumor cerebri causing partially empty sella.  - Prolactin elevated   - ACTH low but received steroids during admission   - FT4 low normal and TSH normal, but given FT4 low normal TSH should be higher and concern for likely inadequate response.   - Endo work up outpatient.     # Sedation   - Weaned off sedation in ICU   - Nimbex turned off 2/27, Prop turned off 3/9   - Precedex turned off and catapres started 3/11  - Catapres weaned off on 4/3     # Insomnia   - Patient worked night shift x 15 years   - Trouble sleeping at night leading to day time sleepiness and poor participation with PT and continued on seroquel 25 QHS with improvement.     # BL LE neuropathy   - CIPN concern   - Continue on Oxy 5mg PRN BID  - Continue on Gabapentin to 1200mg TID  - Continue on Cymbalta 60 mg QD   - Continue on Flexeril 5mg Q8H PRN  - Pain management following     CARDIOVASCULAR  # HTN   - HTN noted in ICU requiring Cardene and esmolol GTTs   - Catapres turned off as above  - Continue on Norvasc 10   - Monitor BP     # AFIB   - Hx of pAFIB   - No RVR episodes or pAFIB episodes in ICU   - No rate control medications needed  - Monitor on telemetry     # RV dilation second to PHTN   - POCUS on arrival to [facility] with RVE concern   - TTE 10/2024 with EF 55-60 with normal LVSF, moderate LVH and normal RVSF and size with no concern for pHTN   - TTE on 2/25 at  with EF 61 and normal LVSF, but enlarged RV size with reduced RVSF, mild TR, mild PHTN with PASP 49, and dilated IVC to 3.4cn, but normal TAPSE 2.1.  - Continue on aggressive diuresis in ICU    - TTE 3/11 with RVSF reduced with TAPSE 1.6 and IVC improved to 2.12cm.   - Continue on lasix 40 PO QD , however dc'ed on 4/7 second to ELLA.   - Monitor volume status    RESPIRATORY  # ARDS second to MFPNA   - Hx of BEA on CPAP presented to  post URI with SOB and found with AHRF with progression to ARDS second to post viral MFPNA   - Intubated 2/25   - Cannulated for vvECMO 2/26   - s/p 60XLTCP tracheostomy 3/7   - s/p ECMO decannulation 3/7   - s/p trach decannulation on 3/28  - [id number] RA with BIPAP 18/10 QHS    GI  # Dysphagia   - s/p PEG 3/7   - s/p FEEST 3/20 and passed   - s/p PEG removal on 4/15     # Constipation   - Continue on miralax BID   - Continue on movantik and dulcolax   - Monitor BMs    # Mild transaminitis   - LFTs elevated in ICU with TBILI elevated likely from ECMO hemolysis   - US ABD with hepatic steatosis   - Trend LFTs      RENAL  # ELLA   - ELLA likely from cardiorenal with RVE   - Diuresed in ICU and improved   - Monitor renal function and UOP   - Recurrent ELLA noted with suspected vancomycin toxicity. Lasix and vanco stopped and monitoring renal function and UOP.     # Hypernatremia   - Hypernatremia with fever spikes   - Fever curve improved and weaned off FWF    INFECTIOUS DISEASE  # Recurrent fever second to OM?   - Persistent fevers despite fungemia tx as below   - RPT panCT with no source   - Bone scan 4/8 with LARGE soft tissue infection in the R side of the pelvis and slightly more intense in the right iliac bone with concern for possible oM .   - MRI PELVIS 4/11 with large wound but no OM   - Case discussed at length with ID and sx and s/p surgical wound debridement on 4/15   - Wound cx with Providencia and Bacteroides   - Holding further vanco given ELLA and no MRSA  - Continue on zosyn through 5/11 for 6 week course     # Fungemia second to panniculitis   - Recurrent fevers noted.   - RPT SCx, UA, CXR and RVP negative  - TTE 3/17 with no IE  - PanCT 3/17 with PNA and persistent panniculitis  - BCx 3/15 and 3/16 with candida albicans  - RPT BCx 3/18 and 3/20 negative     # Recurrent fevers   - Recurrent fevers post ECMO decannulation thought initially to be cytokine surge   - BCx, SCx, UCx, RVP and MRSA on 3/12 negative   - Completed zosyn (3/12-3/18) empirically with improved WBC   - Completed caspo 3/15-4/15    # MFPNA and abdominal pannus cellulitis   - Presented to  post URI with SOB and found with AHRF with progression to ARDS second to post viral MFPNA   - RVP, legionella, strept, BAL, BCx, UCx, HIV, PCP, and adenovirus PCR negative   - Initially started on multiple agents in ICU and ultimately completed zosyn (2/26-3/9) ZMAX (2/25-2/26) and Vanco (2/26-3/1)     # Penile discharge   - GC/ chlamydia negative  - HIV negative   - Monitor for now    # PPX   - MRSA PCR positive and completed Bactroban (2/26-3/3)     HEME   # Anemia likely second to ECMO circuit vs AOCD   - HH low in ICU second to ECMO circuit   - Anemia panel with AOCD  - Trend HH     VASCULAR   # R IJ and BL LE DVTs   - Post ECMO dopplers on 3/9 negative for BL UE/ LE DVTs.   - Subsequent post ECMO dopplers performed on 3/14 and noted with acute, non-occlusive deep vein thrombosis noted within the right internal jugular vein. Acute, occlusive deep vein thromboses noted within the right and left peroneal veins at both mid calves, and age-indeterminate, occlusive deep vein thrombosis visualized within the left gastrocnemius vein at the proximal calf.     - s/p argatroban GTT   - Continue on Eliquis     PODIATRY   # BL plantar feet blisters likely pressors induced  - Seen by podiatry and blisters lanced on 3/4, 3/18 and 3/28  - Continue on local wound care and WBAT per podiatry     ENDOCRINE  # Pre-DM2   - A1C 6.7%  - Continue on ISS, Monitor FS   - IF no demand then stop ISS     LINES/ TUBES  - R IJ and R FEM ECMO (2/26-3/7)     SKIN  # Pannus canndial intertrigo   - Continue on local wound care     # Sacrum/ buttock MAD  - Surgical debridement of sacrum on 4/15  - Deep tunnelling remains as of 4/21   - Continue on local wound care  - May need wound vacc outpatient    ETHICS/ GOC    - FULL CODE     DISPO - PT and PMR following and goal for acute

## 2025-04-23 NOTE — PROGRESS NOTE ADULT - SUBJECTIVE AND OBJECTIVE BOX
Patient is a 39y old  Male who presents with a chief complaint of sob (02 Mar 2025 06:19)     interval history: on zosyn for empiric treatment for OM.  now afebrile  pain improved today  on schedule for PT    REVIEW OF SYSTEMS  weakness  pain    PAST MEDICAL & SURGICAL HISTORY  HTN (hypertension)    Atrial fibrillation    Papilledema of both eyes    Chronic sinusitis    Morbid obesity    No significant past surgical history       CURRENT FUNCTIONAL STATUS  on schedule today    4/22  Bed Mobility  Bed Mobility Training Rehab Potential: good, to achieve stated therapy goals  Bed Mobility Training Sit-to-Supine: minimum assist (75% patient effort);  1 person assist;  nonverbal cues (demo/gestures);  verbal cues;  set-up required;  hand over hand;  bed rails  Bed Mobility Training Supine-to-Sit: minimum assist (75% patient effort);  1 person assist;  nonverbal cues (demo/gestures);  verbal cues;  set-up required;  hand over hand;  bed rails  Bed Mobility Training Limitations: impaired ability to control trunk for mobility;  decreased ability to use legs for bridging/pushing;  decreased strength;  impaired balance;  impaired postural control    Sit-Stand Transfer Training  Sit-to-Stand Transfer Training Treatment not Performed: attempted but unable due to LE weakness, used the subha but unable to stand despite multiple attempts.     Therapeutic Exercise  Therapeutic Exercise Rehab Effort: good  Therapeutic Exercise Detail: Ther ex of LE included ankle pumps, knee extension, hip flexion. Pt instructed to perform as able throughout day. Pt verbalized understanding of therapeutic exercises.         RECENT LABS/IMAGING  CBC Full  -  ( 22 Apr 2025 05:10 )  WBC Count : 8.30 K/uL  RBC Count : 3.02 M/uL  Hemoglobin : 8.5 g/dL  Hematocrit : 26.9 %  Platelet Count - Automated : 358 K/uL  Mean Cell Volume : 89.1 fL  Mean Cell Hemoglobin : 28.1 pg  Mean Cell Hemoglobin Concentration : 31.6 g/dL  Auto Neutrophil # : x  Auto Lymphocyte # : x  Auto Monocyte # : x  Auto Eosinophil # : x  Auto Basophil # : x  Auto Neutrophil % : x  Auto Lymphocyte % : x  Auto Monocyte % : x  Auto Eosinophil % : x  Auto Basophil % : x    04-22    138  |  97[L]  |  13  ----------------------------<  101[H]  3.6   |  27  |  1.42[H]    Ca    9.8      22 Apr 2025 05:10  Phos  5.0     04-22  Mg     1.70     04-22      Urinalysis Basic - ( 22 Apr 2025 05:10 )    Color: x / Appearance: x / SG: x / pH: x  Gluc: 101 mg/dL / Ketone: x  / Bili: x / Urobili: x   Blood: x / Protein: x / Nitrite: x   Leuk Esterase: x / RBC: x / WBC x   Sq Epi: x / Non Sq Epi: x / Bacteria: x        VITALS  T(C): 36.2 (04-23-25 @ 08:00), Max: 36.7 (04-22-25 @ 20:00)  HR: 106 (04-23-25 @ 11:07) (100 - 119)  BP: 146/92 (04-23-25 @ 08:00) (141/91 - 154/105)  RR: 19 (04-23-25 @ 08:00) (18 - 19)  SpO2: 94% (04-23-25 @ 11:07) (92% - 100%)  Wt(kg): --    ALLERGIES  No Known Allergies      MEDICATIONS   acetaminophen     Tablet .. 650 milliGRAM(s) Oral every 6 hours PRN  albuterol/ipratropium for Nebulization 3 milliLiter(s) Nebulizer every 6 hours PRN  amLODIPine   Tablet 10 milliGRAM(s) Oral daily  apixaban 5 milliGRAM(s) Oral every 12 hours  bisacodyl 10 milliGRAM(s) Oral at bedtime  capsaicin HP 0.075% Cream 1 Application(s) Topical four times a day  chlorhexidine 2% Cloths 1 Application(s) Topical <User Schedule>  clotrimazole 1% Cream 1 Application(s) Topical two times a day  cyclobenzaprine 5 milliGRAM(s) Oral three times a day PRN  Dakins Solution - 1/4 Strength 1 Application(s) Topical two times a day  dextrose 50% Injectable 25 Gram(s) IV Push once  dextrose 50% Injectable 12.5 Gram(s) IV Push once  dextrose 50% Injectable 25 Gram(s) IV Push once  dextrose Oral Gel 15 Gram(s) Oral once PRN  DULoxetine 60 milliGRAM(s) Oral daily  gabapentin 1200 milliGRAM(s) Oral every 8 hours  glucagon  Injectable 1 milliGRAM(s) IntraMuscular once  insulin lispro (ADMELOG) corrective regimen sliding scale   SubCutaneous at bedtime  insulin lispro (ADMELOG) corrective regimen sliding scale   SubCutaneous three times a day before meals  lidocaine   4% Patch 1 Patch Transdermal daily  lidocaine   4% Patch 1 Patch Transdermal daily  melatonin 3 milliGRAM(s) Oral at bedtime  multivitamin 1 Tablet(s) Oral daily  mupirocin 2% Ointment 1 Application(s) Topical two times a day  naloxegol 25 milliGRAM(s) Oral daily  oxyCODONE    IR 5 milliGRAM(s) Oral two times a day PRN  oxyCODONE    IR 15 milliGRAM(s) Oral every 6 hours PRN  pantoprazole    Tablet 40 milliGRAM(s) Oral before breakfast  piperacillin/tazobactam IVPB.. 3.375 Gram(s) IV Intermittent every 8 hours  polyethylene glycol 3350 17 Gram(s) Oral two times a day  QUEtiapine 25 milliGRAM(s) Oral at bedtime  sevelamer carbonate 800 milliGRAM(s) Oral three times a day with meals      ----------------------------------------------------------------------------------------     PHYSICAL EXAM  Constitutional - NAD, in chair  Chest - no respiratory distress  Cardiovascular -mild tachy  Extremities - dressings b/l feet  Neurologic Exam -                    Cognitive - Awake, Alert, AAO to self, place, date, year, situation      Communication - fluent     Cranial Nerves - CN 2-12 intact     Motor -                     LEFT    UE - 4+/5                    RIGHT UE - 4+/5                    LEFT    LE -  2/5                      RIGHT LE -  2/4     Sensory - Intact to LT       Balance - WNL Static  Psychiatric - Mood stable, Affect WNL  ----------------------------------------------------------------------------------------  ASSESSMENT/PLAN  40 y/o M w/ PMHx AF (not on AC), HTN, BEA, pseudotumor cerebri s/p LP in 2024, initially presented to [place] on 2/24, SOB, LE edema with URI symptoms and sick contacts, noted to have severe ARDS, intubated however still hypoxia, cannulated for VV ECMO on 2/25, transferred to [facility] on 2/25, started on empiric Vanco, Zosyn for multifocal PNA, abdominal wall cellulitis  with critical illness neuromyopathy  OM on wbc scan  s/p trach and peg 3/7, since decannulated   continue bedside PT and OT     Pain - acetaminophen, oxy ir prn, gabapentin   DVT PPX - on eliquis  Rehab -  limited by pain, reports improvement in pain today. goal is for acute rehab.  Attempted standing for the first time yesterday. Will monitor for tolerance with bedside therapy today.       Total time spent to review relevant records and imaging results, examine patient, complete documentation, and when applicable discuss the case with the patient, family, , social workers, and medical team: 35  minutes

## 2025-04-23 NOTE — PROGRESS NOTE ADULT - NS ATTEND AMEND GEN_ALL_CORE FT
40 yo M w/ class III obesity, pAfib (no AC), HTN, BEA (on CPAP), history of b/l papilledema attributed to pseudotumor cerebri, who is transferred from [place] on 2/26 for VV ECMO 2/2 ARDS.     Patient transferred to [facility] for VV ECMO. S/P diiuresis, TTE/ROBERT with RV failure, s/p treatment of pneumonia. Patient s/p trach and peg. Course complicated by Fungemia s/p course of caspo, also with osteomyelitis s/p surgical debridement. Had critical illness polyneuropathy due to critical illness. With how neuropathic pain. Now decannulated trach and on PO diet.     # acute hypoxemic respiratory failure  # RV failure  # oropharyngeal dysphagia- resolved.   # Critical illness neuropathy  # afib  # DVT  # Osteomyelitis  # Fungemia  - ARDS s/p VV ecmo. Trach decannualted. On room air.   - OOB, ambulate as tolerated, aggressive PT  - Passes s/s eval, now on PO diet. Will keep peg in given recent placement. Will need outpatient f/u for possible removal.   - S/p course of antifungals. Pending continued zosyn for osteo s/p OR. C/W wound care.   - DVT noted on repeat duplex. C/W full ac   - C/W full a/c for now. Hx of afib, [id number] of 2  - Pain 2/2 to likely critical illness neuropathy. On gabapentin, cymbalta, flexiril. Pain consulted but given neuropathic less role for opioids   - C/W PO oxycodone, bowel regiment. WIll attempt to wean off IV Dilaudid for possible d/c planning. Increased gabapentin.   - DVT ppx- Full A/C  - Dispo- full code. Aggressive PT eval. OOB to chair.

## 2025-04-23 NOTE — PROGRESS NOTE ADULT - SUBJECTIVE AND OBJECTIVE BOX
Patient is a 39y old  Male who presents with a chief complaint of sob (02 Mar 2025 06:19)       INTERVAL HPI/OVERNIGHT EVENTS:  Patient seen and evaluated at bedside.  Pt is resting comfortable in NAD. Denies N/V/F/C.  Pain rated at X/10    Allergies    No Known Allergies    Intolerances        Vital Signs Last 24 Hrs  T(C): 36.4 (23 Apr 2025 05:30), Max: 36.7 (22 Apr 2025 20:00)  T(F): 97.6 (23 Apr 2025 05:30), Max: 98.1 (22 Apr 2025 20:00)  HR: 100 (23 Apr 2025 05:30) (100 - 119)  BP: 154/105 (23 Apr 2025 05:30) (141/91 - 154/105)  BP(mean): 117 (23 Apr 2025 05:30) (107 - 117)  RR: 18 (23 Apr 2025 05:30) (18 - 20)  SpO2: 100% (23 Apr 2025 05:30) (92% - 100%)    Parameters below as of 23 Apr 2025 07:05  Patient On (Oxygen Delivery Method): room air        LABS:                        8.5    8.30  )-----------( 358      ( 22 Apr 2025 05:10 )             26.9     04-22    138  |  97[L]  |  13  ----------------------------<  101[H]  3.6   |  27  |  1.42[H]    Ca    9.8      22 Apr 2025 05:10  Phos  5.0     04-22  Mg     1.70     04-22        Urinalysis Basic - ( 22 Apr 2025 05:10 )    Color: x / Appearance: x / SG: x / pH: x  Gluc: 101 mg/dL / Ketone: x  / Bili: x / Urobili: x   Blood: x / Protein: x / Nitrite: x   Leuk Esterase: x / RBC: x / WBC x   Sq Epi: x / Non Sq Epi: x / Bacteria: x      CAPILLARY BLOOD GLUCOSE      POCT Blood Glucose.: 97 mg/dL (23 Apr 2025 07:50)  POCT Blood Glucose.: 95 mg/dL (22 Apr 2025 21:10)  POCT Blood Glucose.: 105 mg/dL (22 Apr 2025 17:04)  POCT Blood Glucose.: 110 mg/dL (22 Apr 2025 11:16)  POCT Blood Glucose.: 111 mg/dL (22 Apr 2025 08:50)      Lower Extremity Physical Exam:  Vascular: DP 1/4 B/L, PT 0/4, B/L secondary to +3 pitting edema, CFT <3 seconds B/L, Temperature gradient warm to cool, B/L.   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 Right foot plantar lateral forefoot wound to subQ, no malodor, no pus, no acute signs of infection. Left foot digit 1,2,4 partial thickness dry gangrene. Left foot lateral plantar forefoot wound to subQ, no malodor, no pus, no acute signs of infection. improving in appearance L>R    RADIOLOGY & ADDITIONAL TESTS:

## 2025-04-23 NOTE — PROGRESS NOTE ADULT - SUBJECTIVE AND OBJECTIVE BOX
RCU PROGRESS NOTE     CHIEF COMPLAINT: Patient is a 39y old  Male who presents with a chief complaint of sob (02 Mar 2025 06:19)      INTERVAL EVENTS:    REVIEW OF SYSTEMS: Seen by bedside during AM rounds and           OBJECTIVE:  ICU Vital Signs Last 24 Hrs  T(C): 36.4 (23 Apr 2025 05:30), Max: 36.7 (22 Apr 2025 20:00)  T(F): 97.6 (23 Apr 2025 05:30), Max: 98.1 (22 Apr 2025 20:00)  HR: 100 (23 Apr 2025 05:30) (100 - 119)  BP: 154/105 (23 Apr 2025 05:30) (141/91 - 154/105)  BP(mean): 117 (23 Apr 2025 05:30) (107 - 117)  ABP: --  ABP(mean): --  RR: 18 (23 Apr 2025 05:30) (18 - 18)  SpO2: 100% (23 Apr 2025 05:30) (92% - 100%)    O2 Parameters below as of 23 Apr 2025 07:05  Patient On (Oxygen Delivery Method): room air              04-22 @ 07:01  -  04-23 @ 07:00  --------------------------------------------------------  IN: 0 mL / OUT: 4150 mL / NET: -4150 mL      CAPILLARY BLOOD GLUCOSE      POCT Blood Glucose.: 97 mg/dL (23 Apr 2025 07:50)      HOSPITAL MEDICATIONS:  MEDICATIONS  (STANDING):  amLODIPine   Tablet 10 milliGRAM(s) Oral daily  apixaban 5 milliGRAM(s) Oral every 12 hours  bisacodyl 10 milliGRAM(s) Oral at bedtime  capsaicin HP 0.075% Cream 1 Application(s) Topical four times a day  chlorhexidine 2% Cloths 1 Application(s) Topical <User Schedule>  clotrimazole 1% Cream 1 Application(s) Topical two times a day  Dakins Solution - 1/4 Strength 1 Application(s) Topical two times a day  dextrose 50% Injectable 25 Gram(s) IV Push once  dextrose 50% Injectable 12.5 Gram(s) IV Push once  dextrose 50% Injectable 25 Gram(s) IV Push once  DULoxetine 60 milliGRAM(s) Oral daily  gabapentin 1200 milliGRAM(s) Oral every 8 hours  glucagon  Injectable 1 milliGRAM(s) IntraMuscular once  insulin lispro (ADMELOG) corrective regimen sliding scale   SubCutaneous three times a day before meals  insulin lispro (ADMELOG) corrective regimen sliding scale   SubCutaneous at bedtime  lidocaine   4% Patch 1 Patch Transdermal daily  lidocaine   4% Patch 1 Patch Transdermal daily  melatonin 3 milliGRAM(s) Oral at bedtime  multivitamin 1 Tablet(s) Oral daily  mupirocin 2% Ointment 1 Application(s) Topical two times a day  naloxegol 25 milliGRAM(s) Oral daily  pantoprazole    Tablet 40 milliGRAM(s) Oral before breakfast  piperacillin/tazobactam IVPB.. 3.375 Gram(s) IV Intermittent every 8 hours  polyethylene glycol 3350 17 Gram(s) Oral daily  QUEtiapine 25 milliGRAM(s) Oral at bedtime  sevelamer carbonate 800 milliGRAM(s) Oral three times a day with meals    MEDICATIONS  (PRN):  acetaminophen     Tablet .. 650 milliGRAM(s) Oral every 6 hours PRN Temp greater or equal to 38C (100.4F), Mild Pain (1 - 3), Moderate Pain (4 - 6)  albuterol/ipratropium for Nebulization 3 milliLiter(s) Nebulizer every 6 hours PRN Shortness of Breath and/or Wheezing  cyclobenzaprine 5 milliGRAM(s) Oral three times a day PRN Muscle Spasm  dextrose Oral Gel 15 Gram(s) Oral once PRN Blood Glucose LESS THAN 70 milliGRAM(s)/deciliter  oxyCODONE    IR 5 milliGRAM(s) Oral two times a day PRN Severe Pain (7 - 10) related to dressing change  oxyCODONE    IR 15 milliGRAM(s) Oral every 6 hours PRN Severe Pain (7 - 10)      PHYSICAL EXAMINATION    LABS:                        8.5    8.30  )-----------( 358      ( 22 Apr 2025 05:10 )             26.9     04-22    138  |  97[L]  |  13  ----------------------------<  101[H]  3.6   |  27  |  1.42[H]    Ca    9.8      22 Apr 2025 05:10  Phos  5.0     04-22  Mg     1.70     04-22        Urinalysis Basic - ( 22 Apr 2025 05:10 )    Color: x / Appearance: x / SG: x / pH: x  Gluc: 101 mg/dL / Ketone: x  / Bili: x / Urobili: x   Blood: x / Protein: x / Nitrite: x   Leuk Esterase: x / RBC: x / WBC x   Sq Epi: x / Non Sq Epi: x / Bacteria: x            PAST MEDICAL & SURGICAL HISTORY:  HTN (hypertension)      Atrial fibrillation  paroxysmal      Papilledema of both eyes      Chronic sinusitis      Morbid obesity      No significant past surgical history          FAMILY HISTORY:      Social History:  sexually active with female partner occasionally (26 Feb 2025 01:48)      RADIOLOGY:  [ ] Reviewed and interpreted by me    PULMONARY FUNCTION TESTS:    EKG: RCU PROGRESS NOTE     CHIEF COMPLAINT: Patient is a 39y old  Male who presents with a chief complaint of sob (02 Mar 2025 06:19)      INTERVAL EVENTS:  - No interval events overnight   - Pain improving   - Goal for acute    REVIEW OF SYSTEMS: Seen by bedside during AM rounds and denies SOB          OBJECTIVE:  ICU Vital Signs Last 24 Hrs  T(C): 36.4 (23 Apr 2025 05:30), Max: 36.7 (22 Apr 2025 20:00)  T(F): 97.6 (23 Apr 2025 05:30), Max: 98.1 (22 Apr 2025 20:00)  HR: 100 (23 Apr 2025 05:30) (100 - 119)  BP: 154/105 (23 Apr 2025 05:30) (141/91 - 154/105)  BP(mean): 117 (23 Apr 2025 05:30) (107 - 117)  ABP: --  ABP(mean): --  RR: 18 (23 Apr 2025 05:30) (18 - 18)  SpO2: 100% (23 Apr 2025 05:30) (92% - 100%)    O2 Parameters below as of 23 Apr 2025 07:05  Patient On (Oxygen Delivery Method): room air              04-22 @ 07:01  -  04-23 @ 07:00  --------------------------------------------------------  IN: 0 mL / OUT: 4150 mL / NET: -4150 mL      CAPILLARY BLOOD GLUCOSE  POCT Blood Glucose.: 97 mg/dL (23 Apr 2025 07:50)      HOSPITAL MEDICATIONS:  MEDICATIONS  (STANDING):  amLODIPine   Tablet 10 milliGRAM(s) Oral daily  apixaban 5 milliGRAM(s) Oral every 12 hours  bisacodyl 10 milliGRAM(s) Oral at bedtime  capsaicin HP 0.075% Cream 1 Application(s) Topical four times a day  chlorhexidine 2% Cloths 1 Application(s) Topical <User Schedule>  clotrimazole 1% Cream 1 Application(s) Topical two times a day  Dakins Solution - 1/4 Strength 1 Application(s) Topical two times a day  dextrose 50% Injectable 25 Gram(s) IV Push once  dextrose 50% Injectable 12.5 Gram(s) IV Push once  dextrose 50% Injectable 25 Gram(s) IV Push once  DULoxetine 60 milliGRAM(s) Oral daily  gabapentin 1200 milliGRAM(s) Oral every 8 hours  glucagon  Injectable 1 milliGRAM(s) IntraMuscular once  insulin lispro (ADMELOG) corrective regimen sliding scale   SubCutaneous three times a day before meals  insulin lispro (ADMELOG) corrective regimen sliding scale   SubCutaneous at bedtime  lidocaine   4% Patch 1 Patch Transdermal daily  lidocaine   4% Patch 1 Patch Transdermal daily  melatonin 3 milliGRAM(s) Oral at bedtime  multivitamin 1 Tablet(s) Oral daily  mupirocin 2% Ointment 1 Application(s) Topical two times a day  naloxegol 25 milliGRAM(s) Oral daily  pantoprazole    Tablet 40 milliGRAM(s) Oral before breakfast  piperacillin/tazobactam IVPB.. 3.375 Gram(s) IV Intermittent every 8 hours  polyethylene glycol 3350 17 Gram(s) Oral daily  QUEtiapine 25 milliGRAM(s) Oral at bedtime  sevelamer carbonate 800 milliGRAM(s) Oral three times a day with meals    MEDICATIONS  (PRN):  acetaminophen     Tablet .. 650 milliGRAM(s) Oral every 6 hours PRN Temp greater or equal to 38C (100.4F), Mild Pain (1 - 3), Moderate Pain (4 - 6)  albuterol/ipratropium for Nebulization 3 milliLiter(s) Nebulizer every 6 hours PRN Shortness of Breath and/or Wheezing  cyclobenzaprine 5 milliGRAM(s) Oral three times a day PRN Muscle Spasm  dextrose Oral Gel 15 Gram(s) Oral once PRN Blood Glucose LESS THAN 70 milliGRAM(s)/deciliter  oxyCODONE    IR 5 milliGRAM(s) Oral two times a day PRN Severe Pain (7 - 10) related to dressing change  oxyCODONE    IR 15 milliGRAM(s) Oral every 6 hours PRN Severe Pain (7 - 10)      PHYSICAL EXAMINATION  General: NAD and obese  HEENT: Trach decannulation stoma dressing CDI  Cards: S1/S2, no murmurs   Pulm: CTA bilaterally. No wheezes.   Abdomen: Soft, nondistended and nontender. BS (+)   Extremities: No pedal edema. ISI of BL upper and lower extremities with some weakness  Neurology: Awake and alert with no acute focal neurological deficits     LABS:                        8.5    8.30  )-----------( 358      ( 22 Apr 2025 05:10 )             26.9     04-22    138  |  97[L]  |  13  ----------------------------<  101[H]  3.6   |  27  |  1.42[H]    Ca    9.8      22 Apr 2025 05:10  Phos  5.0     04-22  Mg     1.70     04-22        Urinalysis Basic - ( 22 Apr 2025 05:10 )  Color: x / Appearance: x / SG: x / pH: x  Gluc: 101 mg/dL / Ketone: x  / Bili: x / Urobili: x   Blood: x / Protein: x / Nitrite: x   Leuk Esterase: x / RBC: x / WBC x   Sq Epi: x / Non Sq Epi: x / Bacteria: x            PAST MEDICAL & SURGICAL HISTORY:  HTN (hypertension)      Atrial fibrillation  paroxysmal      Papilledema of both eyes      Chronic sinusitis      Morbid obesity      No significant past surgical history          FAMILY HISTORY:      Social History:  sexually active with female partner occasionally (26 Feb 2025 01:48)      RADIOLOGY:  [ ] Reviewed and interpreted by me    PULMONARY FUNCTION TESTS:    EKG: Physical Exam:  Gen: NAD, non-toxic appearing, awake alert   HEENT: normal conjunctiva, oral mucosa moist  Lung: CTAB, no respiratory distress, no wheezes/rhonchi/rales B/L, speaking in full sentences  CV: RRR  Abd: soft, NT, ND, no guarding, no rigidity, no rebound tenderness  MSK: no visible deformities, ROM normal in UE/LE, +tinels test of LUE, TTP of dorsal/palmar wrist  Neuro: No focal sensory or motor deficits in radial/ulnar/median nerve distributions of LUE, 2+ radial pulses B/L, cap refill <2sec in LUE  Skin: Warm, well perfused, no rash, no leg swelling  ~Doug Abbasi MD (PGY-3)

## 2025-04-24 LAB
ANION GAP SERPL CALC-SCNC: 16 MMOL/L — HIGH (ref 7–14)
BUN SERPL-MCNC: 10 MG/DL — SIGNIFICANT CHANGE UP (ref 7–23)
CALCIUM SERPL-MCNC: 10 MG/DL — SIGNIFICANT CHANGE UP (ref 8.4–10.5)
CHLORIDE SERPL-SCNC: 97 MMOL/L — LOW (ref 98–107)
CO2 SERPL-SCNC: 26 MMOL/L — SIGNIFICANT CHANGE UP (ref 22–31)
CREAT SERPL-MCNC: 1.26 MG/DL — SIGNIFICANT CHANGE UP (ref 0.5–1.3)
EGFR: 75 ML/MIN/1.73M2 — SIGNIFICANT CHANGE UP
EGFR: 75 ML/MIN/1.73M2 — SIGNIFICANT CHANGE UP
GLUCOSE BLDC GLUCOMTR-MCNC: 106 MG/DL — HIGH (ref 70–99)
GLUCOSE BLDC GLUCOMTR-MCNC: 108 MG/DL — HIGH (ref 70–99)
GLUCOSE BLDC GLUCOMTR-MCNC: 109 MG/DL — HIGH (ref 70–99)
GLUCOSE BLDC GLUCOMTR-MCNC: 120 MG/DL — HIGH (ref 70–99)
GLUCOSE SERPL-MCNC: 100 MG/DL — HIGH (ref 70–99)
HCT VFR BLD CALC: 29.1 % — LOW (ref 39–50)
HGB BLD-MCNC: 8.7 G/DL — LOW (ref 13–17)
MAGNESIUM SERPL-MCNC: 1.8 MG/DL — SIGNIFICANT CHANGE UP (ref 1.6–2.6)
MCHC RBC-ENTMCNC: 26.5 PG — LOW (ref 27–34)
MCHC RBC-ENTMCNC: 29.9 G/DL — LOW (ref 32–36)
MCV RBC AUTO: 88.7 FL — SIGNIFICANT CHANGE UP (ref 80–100)
NRBC # BLD AUTO: 0 K/UL — SIGNIFICANT CHANGE UP (ref 0–0)
NRBC # FLD: 0 K/UL — SIGNIFICANT CHANGE UP (ref 0–0)
NRBC BLD AUTO-RTO: 0 /100 WBCS — SIGNIFICANT CHANGE UP (ref 0–0)
PHOSPHATE SERPL-MCNC: 5 MG/DL — HIGH (ref 2.5–4.5)
PLATELET # BLD AUTO: 344 K/UL — SIGNIFICANT CHANGE UP (ref 150–400)
POTASSIUM SERPL-MCNC: 3.7 MMOL/L — SIGNIFICANT CHANGE UP (ref 3.5–5.3)
POTASSIUM SERPL-SCNC: 3.7 MMOL/L — SIGNIFICANT CHANGE UP (ref 3.5–5.3)
RBC # BLD: 3.28 M/UL — LOW (ref 4.2–5.8)
RBC # FLD: 21.7 % — HIGH (ref 10.3–14.5)
SODIUM SERPL-SCNC: 139 MMOL/L — SIGNIFICANT CHANGE UP (ref 135–145)
VANCOMYCIN FLD-MCNC: <4 UG/ML — SIGNIFICANT CHANGE UP
WBC # BLD: 7.72 K/UL — SIGNIFICANT CHANGE UP (ref 3.8–10.5)
WBC # FLD AUTO: 7.72 K/UL — SIGNIFICANT CHANGE UP (ref 3.8–10.5)

## 2025-04-24 PROCEDURE — 99233 SBSQ HOSP IP/OBS HIGH 50: CPT

## 2025-04-24 PROCEDURE — 99232 SBSQ HOSP IP/OBS MODERATE 35: CPT

## 2025-04-24 PROCEDURE — G0545: CPT

## 2025-04-24 RX ORDER — OXYCODONE HYDROCHLORIDE 30 MG/1
5 TABLET ORAL
Refills: 0 | Status: DISCONTINUED | OUTPATIENT
Start: 2025-04-24 | End: 2025-04-25

## 2025-04-24 RX ORDER — OXYCODONE HYDROCHLORIDE 30 MG/1
15 TABLET ORAL EVERY 6 HOURS
Refills: 0 | Status: DISCONTINUED | OUTPATIENT
Start: 2025-04-24 | End: 2025-04-25

## 2025-04-24 RX ADMIN — Medication 25 GRAM(S): at 13:11

## 2025-04-24 RX ADMIN — LIDOCAINE HYDROCHLORIDE 1 PATCH: 20 JELLY TOPICAL at 11:20

## 2025-04-24 RX ADMIN — GABAPENTIN 1200 MILLIGRAM(S): 400 CAPSULE ORAL at 13:11

## 2025-04-24 RX ADMIN — CYCLOBENZAPRINE HYDROCHLORIDE 5 MILLIGRAM(S): 15 CAPSULE, EXTENDED RELEASE ORAL at 14:49

## 2025-04-24 RX ADMIN — APIXABAN 5 MILLIGRAM(S): 2.5 TABLET, FILM COATED ORAL at 05:09

## 2025-04-24 RX ADMIN — Medication 1 APPLICATION(S): at 05:07

## 2025-04-24 RX ADMIN — SEVELAMER HYDROCHLORIDE 800 MILLIGRAM(S): 800 TABLET ORAL at 17:31

## 2025-04-24 RX ADMIN — MUPIROCIN CALCIUM 1 APPLICATION(S): 20 CREAM TOPICAL at 05:08

## 2025-04-24 RX ADMIN — Medication 1 APPLICATION(S): at 11:21

## 2025-04-24 RX ADMIN — SEVELAMER HYDROCHLORIDE 800 MILLIGRAM(S): 800 TABLET ORAL at 11:39

## 2025-04-24 RX ADMIN — OXYCODONE HYDROCHLORIDE 15 MILLIGRAM(S): 30 TABLET ORAL at 05:09

## 2025-04-24 RX ADMIN — Medication 10 MILLIGRAM(S): at 21:21

## 2025-04-24 RX ADMIN — NALOXEGOL OXALATE 25 MILLIGRAM(S): 12.5 TABLET, FILM COATED ORAL at 11:19

## 2025-04-24 RX ADMIN — Medication 40 MILLIGRAM(S): at 05:09

## 2025-04-24 RX ADMIN — MUPIROCIN CALCIUM 1 APPLICATION(S): 20 CREAM TOPICAL at 17:31

## 2025-04-24 RX ADMIN — APIXABAN 5 MILLIGRAM(S): 2.5 TABLET, FILM COATED ORAL at 17:31

## 2025-04-24 RX ADMIN — GABAPENTIN 1200 MILLIGRAM(S): 400 CAPSULE ORAL at 05:09

## 2025-04-24 RX ADMIN — OXYCODONE HYDROCHLORIDE 15 MILLIGRAM(S): 30 TABLET ORAL at 11:19

## 2025-04-24 RX ADMIN — POLYETHYLENE GLYCOL 3350 17 GRAM(S): 17 POWDER, FOR SOLUTION ORAL at 05:09

## 2025-04-24 RX ADMIN — Medication 25 GRAM(S): at 05:08

## 2025-04-24 RX ADMIN — Medication 3 MILLIGRAM(S): at 21:21

## 2025-04-24 RX ADMIN — SEVELAMER HYDROCHLORIDE 800 MILLIGRAM(S): 800 TABLET ORAL at 08:25

## 2025-04-24 RX ADMIN — OXYCODONE HYDROCHLORIDE 15 MILLIGRAM(S): 30 TABLET ORAL at 16:53

## 2025-04-24 RX ADMIN — CLOTRIMAZOLE 1 APPLICATION(S): 1 CREAM TOPICAL at 17:32

## 2025-04-24 RX ADMIN — SODIUM HYPOCHLORITE 1 APPLICATION(S): 0.12 SOLUTION TOPICAL at 17:31

## 2025-04-24 RX ADMIN — OXYCODONE HYDROCHLORIDE 5 MILLIGRAM(S): 30 TABLET ORAL at 14:47

## 2025-04-24 RX ADMIN — AMLODIPINE BESYLATE 10 MILLIGRAM(S): 10 TABLET ORAL at 05:08

## 2025-04-24 RX ADMIN — QUETIAPINE FUMARATE 25 MILLIGRAM(S): 25 TABLET ORAL at 21:26

## 2025-04-24 RX ADMIN — CLOTRIMAZOLE 1 APPLICATION(S): 1 CREAM TOPICAL at 05:07

## 2025-04-24 RX ADMIN — GABAPENTIN 1200 MILLIGRAM(S): 400 CAPSULE ORAL at 21:21

## 2025-04-24 RX ADMIN — SODIUM HYPOCHLORITE 1 APPLICATION(S): 0.12 SOLUTION TOPICAL at 05:08

## 2025-04-24 RX ADMIN — Medication 25 GRAM(S): at 21:20

## 2025-04-24 RX ADMIN — Medication 1 APPLICATION(S): at 17:32

## 2025-04-24 RX ADMIN — POLYETHYLENE GLYCOL 3350 17 GRAM(S): 17 POWDER, FOR SOLUTION ORAL at 17:31

## 2025-04-24 RX ADMIN — Medication 1 TABLET(S): at 11:19

## 2025-04-24 RX ADMIN — DULOXETINE 60 MILLIGRAM(S): 20 CAPSULE, DELAYED RELEASE ORAL at 11:19

## 2025-04-24 NOTE — PROGRESS NOTE ADULT - SUBJECTIVE AND OBJECTIVE BOX
[facility]-- WOUND TEAM -- FOLLOW UP NOTE  --------------------------------------------------------------------------------    subjective: Patient seen and examined with patient's father and primary Rn at bedside. Per patient his foot pain remains but has improved. Reports bilateral hip discomfort with turning and positioning - primary team aware. Reports adequate appetite, has not had a BM in several days. Per patient he has been working with PT over the last several days trying to stand/ambulate, plan to continue working today. Denies pain and/or tenderness to sacral wound. Denies CP, SOB, abdominal pain, n/v, fever, chills, diarrhea.     Interval HPI/24 hour events:   -Efrain added for wound healing optimization  -Discharge planning in progress      Chart reviewed including labs and relevant images      Diet:  Diet, Regular:   Consistent Carbohydrate Evening Snack (CSTCHOSN)  No Concentrated Phosphorus  Efrain(7 Gm Arginine/7 Gm Glut/1.2 Gm HMB     Qty per Day:  1  Supplement Feeding Modality:  Oral  Ensure Max Cans or Servings Per Day:  1       Frequency:  Two Times a day (04-21-25 @ 18:07)      ROS: General, skin see above.    ALLERGIES & MEDICATIONS  --------------------------------------------------------------------------------  Allergies    No Known Allergies    Intolerances          STANDING INPATIENT MEDICATIONS    amLODIPine   Tablet 10 milliGRAM(s) Oral daily  apixaban 5 milliGRAM(s) Oral every 12 hours  bisacodyl 10 milliGRAM(s) Oral at bedtime  capsaicin HP 0.075% Cream 1 Application(s) Topical four times a day  chlorhexidine 2% Cloths 1 Application(s) Topical <User Schedule>  clotrimazole 1% Cream 1 Application(s) Topical two times a day  Dakins Solution - 1/4 Strength 1 Application(s) Topical two times a day  dextrose 50% Injectable 25 Gram(s) IV Push once  dextrose 50% Injectable 12.5 Gram(s) IV Push once  dextrose 50% Injectable 25 Gram(s) IV Push once  DULoxetine 60 milliGRAM(s) Oral daily  gabapentin 1200 milliGRAM(s) Oral every 8 hours  glucagon  Injectable 1 milliGRAM(s) IntraMuscular once  insulin lispro (ADMELOG) corrective regimen sliding scale   SubCutaneous three times a day before meals  insulin lispro (ADMELOG) corrective regimen sliding scale   SubCutaneous at bedtime  lidocaine   4% Patch 1 Patch Transdermal daily  lidocaine   4% Patch 1 Patch Transdermal daily  melatonin 3 milliGRAM(s) Oral at bedtime  multivitamin 1 Tablet(s) Oral daily  mupirocin 2% Ointment 1 Application(s) Topical two times a day  naloxegol 25 milliGRAM(s) Oral daily  pantoprazole    Tablet 40 milliGRAM(s) Oral before breakfast  piperacillin/tazobactam IVPB.. 3.375 Gram(s) IV Intermittent every 8 hours  polyethylene glycol 3350 17 Gram(s) Oral two times a day  QUEtiapine 25 milliGRAM(s) Oral at bedtime  sevelamer carbonate 800 milliGRAM(s) Oral three times a day with meals      PRN INPATIENT MEDICATION  acetaminophen     Tablet .. 650 milliGRAM(s) Oral every 6 hours PRN  albuterol/ipratropium for Nebulization 3 milliLiter(s) Nebulizer every 6 hours PRN  cyclobenzaprine 5 milliGRAM(s) Oral three times a day PRN  dextrose Oral Gel 15 Gram(s) Oral once PRN  oxyCODONE    IR 5 milliGRAM(s) Oral two times a day PRN  oxyCODONE    IR 15 milliGRAM(s) Oral every 6 hours PRN        Vital signs:  T(C): 36.9 (04-24-25 @ 08:00), Max: 37.2 (04-23-25 @ 16:00)  HR: 107 (04-24-25 @ 08:00) (102 - 118)  BP: 132/76 (04-24-25 @ 08:00) (132/76 - 155/104)  RR: 19 (04-24-25 @ 08:00) (16 - 19)  SpO2: 95% (04-24-25 @ 08:00) (92% - 100%)  Wt(kg): 197.1 kg (04-)        04-23-25 @ 07:01  -  04-24-25 @ 07:00  --------------------------------------------------------  IN: 0 mL / OUT: 450 mL / NET: -450 mL        Constitutional: NAD, A&Ox3. Morbidly Obese. Patient able to turn and position with stand by assist.  (+) bariatric low airloss support surface, (+) fluidized positioning devices, bariatric seat cushion on chair, heels slightly offloaded with pillow  HEENT: NC/AT, non icteric, mucosa moist. Tracheostomy site with hypergranulation 0.5cmx0.5cmx0.1cm, friable with cleansing- silicone foam with border applied.  Cardiovascular: tachycardic   Respiratory: nonlabored, room air, equal chest expansion  Gastrointestinal: soft NT/ND. Obese. No BM during exam.   LUQ surgical wound s/p PEG tube removal- 0.4cmx0.4cmx0.2cm (stable), tissue type exposing pink moist granulation, periwound skin with hyperpigmentation. No increased warmth, no erythema, no induration   Increase moisture beneath ABD pannus and bilateral groin- skin predominantly intact, right groin denuded epidermis [id number]- pink moist dermis, friable with cleansing, periwound intact, no edema, no erythema, no increased warmth, no induration, no fluctuance, no crepitus.  : functional incontinence with condom catheter, seal intact.  Musculoskeletal: aROM of all extremities, requires 1 person stand by assistance with turning and positioning, no gross deformities or contractures.  Vascular: Deferred, to Podiatry, b/l feet dressings c/d/i.  Skin:  moist w/ good turgor.   Intertriginous folds of posterior trunk along bilateral flank risks for moisture, skin intact  Sacrum/gluteal cleft and bilateral buttocks Stage 4 complicated by moisture and incontinence (pt turned to right side)  Sacral/gluteal cleft:  -8.5cmx2.1luy8em (stable), circumferential undermining 6.5cm-9.5cm, with deepest from 12 - 4 o'clock  -Wound base with pink-moist granular base with scattered areas of hypergranulation with tan-moist adherent slough; digitally broke up septations within areas of slough; previous tunnel no longer isolated as deep tunnel, now area of undermining from 12-4 o'clock extending 9.5cm, able to palpate where undermining terminates.    Small area of hypergranulation at 12 o'clock of wound edge.  -Moderate serosanguineous drainage, no purulent drainage, no odor.   Right buttock shallow ulceration: 7cmx5.5cmx0.2cm (prev 4ivr5rnp6.2cm), satellite denuded epidermis distal to inferior wound edge 0.5cmx2.5cmx0.2cm (prev 0.5cmx3.5cmx0.2cm) circumferential rim of re-epitheliazation.  Left buttock shallow ulceration: 0.4tco1wgt4.2cm (prev 0.0knk4fei0.2cm)- 100% pink-moist dermis and re-epithelialization migrating from wound edges.  Remaining periwound skin with no erythema, no edema, no induration, no fluctuance, no crepitus.        LABS/ CULTURES/ RADIOLOGY:              8.7    7.72  >-----------<  344      [04-24-25 @ 05:28]              29.1     139  |  97  |  10  ----------------------------<  100      [04-24-25 @ 05:28]  3.7   |  26  |  1.26        Ca     10.0     [04-24-25 @ 05:28]      Mg     1.80     [04-24-25 @ 05:28]      Phos  5.0     [04-24-25 @ 05:28]                CAPILLARY BLOOD GLUCOSE      POCT Blood Glucose.: 120 mg/dL (24 Apr 2025 11:04)  POCT Blood Glucose.: 108 mg/dL (24 Apr 2025 07:49)  POCT Blood Glucose.: 116 mg/dL (23 Apr 2025 21:27)  POCT Blood Glucose.: 110 mg/dL (23 Apr 2025 17:09)  POCT Blood Glucose.: 134 mg/dL (23 Apr 2025 11:30)      A1C with Estimated Average Glucose Result: 6.7 % (02-25-25 @ 05:44)          Culture - Tissue with Gram Stain (04.15.25 @ 09:00)   Gram Stain:   Rare polymorphonuclear leukocytes per low power field   Rare Gram Negative Rods per oil power field  - Amoxicillin/Clavulanic Acid: R >16/8  - Ampicillin: R >16 These ampicillin results predict results for amoxicillin  - Ampicillin/Sulbactam: I 16/8  - Aztreonam: S <=4  - Cefazolin: R >16  - Cefepime: S <=2  - Cefoxitin: S <=8  - Ceftriaxone: S <=1  - Ciprofloxacin: R >2  - Ertapenem: S <=0.5  - Levofloxacin: R 2  - Meropenem: S <=1  - Piperacillin/Tazobactam: S <=8  - Trimethoprim/Sulfamethoxazole: S <=0.5/9.5  Specimen Source: Tissue SACRAL WOUND AEBNDEMENT  Culture Results:   Few Providencia stuartii   Rare Bacteroides vulgatus group "Susceptibilities not performed"  Organism Identification: Providencia stuartii  Organism: Providencia stuartii  Method Type: TRINIDAD      Culture - Acid Fast - Tissue w/Smear (04.15.25 @ 09:00)   Specimen Source: Tissue SACRAL WOUND AEBNDEMENT  Acid Fast Bacilli Smear:   No acid-fast bacilli seen by fluorochrome stain  Culture Results:   No acid-fast bacilli isolated at 1 week. ****Acid-fast cultures are held   for 6 weeks.****      Culture - Fungal, Tissue (04.15.25 @ 09:00)   Specimen Source: Tissue SACRAL WOUND AEBNDEMENT  Culture Results:   No fungus isolated at 1 week.

## 2025-04-24 NOTE — PROGRESS NOTE ADULT - SUBJECTIVE AND OBJECTIVE BOX
Patient is a 39y old  Male who presents with a chief complaint of sob (02 Mar 2025 06:19)      Interval history: reports pain improved today. tolerated dressing changes. Worked with PT yesterday and on schedule for today.    REVIEW OF SYSTEMS  pain  weakness    PAST MEDICAL & SURGICAL HISTORY  HTN (hypertension)    Atrial fibrillation    Papilledema of both eyes    Chronic sinusitis    Morbid obesity    No significant past surgical history         CURRENT FUNCTIONAL STATUS  4/23  Bed Mobility  Bed Mobility Training Rehab Potential: good, to achieve stated therapy goals  Bed Mobility Training Sit-to-Supine: minimum assist (75% patient effort);  1 person assist;  nonverbal cues (demo/gestures);  verbal cues;  bed rails  Bed Mobility Training Supine-to-Sit: minimum assist (75% patient effort);  1 person assist;  nonverbal cues (demo/gestures);  verbal cues;  bed rails  Bed Mobility Training Limitations: decreased ability to use legs for bridging/pushing;  decreased strength;  impaired balance    Therapeutic Exercise  Therapeutic Exercise Rehab Effort: good  Therapeutic Exercise Detail: pt. performed active ROM bilateral LE in various planes            RECENT LABS/IMAGING  CBC Full  -  ( 24 Apr 2025 05:28 )  WBC Count : 7.72 K/uL  RBC Count : 3.28 M/uL  Hemoglobin : 8.7 g/dL  Hematocrit : 29.1 %  Platelet Count - Automated : 344 K/uL  Mean Cell Volume : 88.7 fL  Mean Cell Hemoglobin : 26.5 pg  Mean Cell Hemoglobin Concentration : 29.9 g/dL  Auto Neutrophil # : x  Auto Lymphocyte # : x  Auto Monocyte # : x  Auto Eosinophil # : x  Auto Basophil # : x  Auto Neutrophil % : x  Auto Lymphocyte % : x  Auto Monocyte % : x  Auto Eosinophil % : x  Auto Basophil % : x    04-24    139  |  97[L]  |  10  ----------------------------<  100[H]  3.7   |  26  |  1.26    Ca    10.0      24 Apr 2025 05:28  Phos  5.0     04-24  Mg     1.80     04-24      Urinalysis Basic - ( 24 Apr 2025 05:28 )    Color: x / Appearance: x / SG: x / pH: x  Gluc: 100 mg/dL / Ketone: x  / Bili: x / Urobili: x   Blood: x / Protein: x / Nitrite: x   Leuk Esterase: x / RBC: x / WBC x   Sq Epi: x / Non Sq Epi: x / Bacteria: x        VITALS  T(C): 36.9 (04-24-25 @ 08:00), Max: 37.2 (04-23-25 @ 16:00)  HR: 107 (04-24-25 @ 08:00) (102 - 118)  BP: 132/76 (04-24-25 @ 08:00) (132/76 - 155/104)  RR: 19 (04-24-25 @ 08:00) (16 - 19)  SpO2: 95% (04-24-25 @ 08:00) (92% - 100%)  Wt(kg): --    ALLERGIES  No Known Allergies      MEDICATIONS   acetaminophen     Tablet .. 650 milliGRAM(s) Oral every 6 hours PRN  albuterol/ipratropium for Nebulization 3 milliLiter(s) Nebulizer every 6 hours PRN  amLODIPine   Tablet 10 milliGRAM(s) Oral daily  apixaban 5 milliGRAM(s) Oral every 12 hours  bisacodyl 10 milliGRAM(s) Oral at bedtime  capsaicin HP 0.075% Cream 1 Application(s) Topical four times a day  chlorhexidine 2% Cloths 1 Application(s) Topical <User Schedule>  clotrimazole 1% Cream 1 Application(s) Topical two times a day  cyclobenzaprine 5 milliGRAM(s) Oral three times a day PRN  Dakins Solution - 1/4 Strength 1 Application(s) Topical two times a day  dextrose 50% Injectable 25 Gram(s) IV Push once  dextrose 50% Injectable 12.5 Gram(s) IV Push once  dextrose 50% Injectable 25 Gram(s) IV Push once  dextrose Oral Gel 15 Gram(s) Oral once PRN  DULoxetine 60 milliGRAM(s) Oral daily  gabapentin 1200 milliGRAM(s) Oral every 8 hours  glucagon  Injectable 1 milliGRAM(s) IntraMuscular once  insulin lispro (ADMELOG) corrective regimen sliding scale   SubCutaneous at bedtime  insulin lispro (ADMELOG) corrective regimen sliding scale   SubCutaneous three times a day before meals  lidocaine   4% Patch 1 Patch Transdermal daily  lidocaine   4% Patch 1 Patch Transdermal daily  melatonin 3 milliGRAM(s) Oral at bedtime  multivitamin 1 Tablet(s) Oral daily  mupirocin 2% Ointment 1 Application(s) Topical two times a day  naloxegol 25 milliGRAM(s) Oral daily  oxyCODONE    IR 5 milliGRAM(s) Oral two times a day PRN  oxyCODONE    IR 15 milliGRAM(s) Oral every 6 hours PRN  pantoprazole    Tablet 40 milliGRAM(s) Oral before breakfast  piperacillin/tazobactam IVPB.. 3.375 Gram(s) IV Intermittent every 8 hours  polyethylene glycol 3350 17 Gram(s) Oral two times a day  QUEtiapine 25 milliGRAM(s) Oral at bedtime  sevelamer carbonate 800 milliGRAM(s) Oral three times a day with meals      ----------------------------------------------------------------------------------------    PHYSICAL EXAM  Constitutional - NAD   Chest - no respiratory distress  Cardiovascular -mild tachy  Extremities - dressings b/l feet  Neurologic Exam -                    Cognitive - Awake, Alert, AAO to self, place, date, year, situation      Communication - fluent     Cranial Nerves - CN 2-12 intact     Motor -                     LEFT    UE - 4+/5                    RIGHT UE - 4+/5                    LEFT    LE -  2/5                      RIGHT LE -  2/4     Sensory - Intact to LT       Balance - WNL Static  Psychiatric - Mood stable, Affect WNL  ----------------------------------------------------------------------------------------  ASSESSMENT/PLAN  38 y/o M w/ PMHx AF (not on AC), HTN, BEA, pseudotumor cerebri s/p LP in 2024, initially presented to [facility] on 2/24, SOB, LE edema with URI symptoms and sick contacts, noted to have severe ARDS, intubated however still hypoxia, cannulated for VV ECMO on 2/25, transferred to [facility] on 2/25, started on empiric Vanco, Zosyn for multifocal PNA, abdominal wall cellulitis  with critical illness neuromyopathy  OM on wbc scan  s/p trach and peg 3/7, since decannulated   continue bedside PT and OT     Pain - acetaminophen, oxy ir prn, gabapentin   DVT PPX - on eliquis  Rehab -   recommend acute inpatient rehab when medically cleared. Patient can tolerate 3 hours per day of therapy with  medical supervision. Patient is able to assist with bed mobility. bed mobility min assist with PT. To attempt standing with PT today.  pain must be controlled off iv pain medication.  controlled today  tolerated dressing changes today without iv pain med  last iv dilaudid given 4/20  Total time spent to review relevant records and imaging results, examine patient, complete documentation, and when applicable discuss the case with the patient, family, , social workers, and medical team: 35  minutes

## 2025-04-24 NOTE — PROGRESS NOTE ADULT - SUBJECTIVE AND OBJECTIVE BOX
Infectious Diseases Follow Up:    Patient is a 39y old  Male who presents with a chief complaint of sob (02 Mar 2025 06:19)      Interval History/ROS:  No acute events     Allergies  No Known Allergies        ANTIMICROBIALS:  piperacillin/tazobactam IVPB.. 3.375 every 8 hours      Current Abx:     Previous Abx     OTHER MEDS:  MEDICATIONS  (STANDING):  acetaminophen     Tablet .. 650 every 6 hours PRN  albuterol/ipratropium for Nebulization 3 every 6 hours PRN  amLODIPine   Tablet 10 daily  apixaban 5 every 12 hours  bisacodyl 10 at bedtime  cyclobenzaprine 5 three times a day PRN  dextrose 50% Injectable 25 once  dextrose 50% Injectable 12.5 once  dextrose 50% Injectable 25 once  dextrose Oral Gel 15 once PRN  DULoxetine 60 daily  gabapentin 1200 every 8 hours  glucagon  Injectable 1 once  insulin lispro (ADMELOG) corrective regimen sliding scale  at bedtime  insulin lispro (ADMELOG) corrective regimen sliding scale  three times a day before meals  melatonin 3 at bedtime  naloxegol 25 daily  oxyCODONE    IR 5 two times a day PRN  oxyCODONE    IR 15 every 6 hours PRN  pantoprazole    Tablet 40 before breakfast  polyethylene glycol 3350 17 two times a day  QUEtiapine 25 at bedtime      Vital Signs Last 24 Hrs  T(C): 36.9 (24 Apr 2025 08:00), Max: 37.2 (23 Apr 2025 16:00)  T(F): 98.4 (24 Apr 2025 08:00), Max: 99 (23 Apr 2025 16:00)  HR: 107 (24 Apr 2025 08:00) (102 - 118)  BP: 132/76 (24 Apr 2025 08:00) (132/76 - 155/104)  BP(mean): 117 (24 Apr 2025 05:06) (117 - 117)  RR: 19 (24 Apr 2025 08:00) (16 - 19)  SpO2: 95% (24 Apr 2025 08:00) (92% - 100%)    Parameters below as of 24 Apr 2025 08:00  Patient On (Oxygen Delivery Method): room air        PHYSICAL EXAM:  GENERAL: NAD  HEAD:  Atraumatic, Normocephalic  EYES: EOMI, conjunctiva and sclera clear  CHEST/LUNG: On NC, CTAB  HEART: RRR  ABDOMEN: Soft, Nontender, Nondistended  Extremities: B/l feet wrapped   PSYCH: AAOx3                                     8.7    7.72  )-----------( 344      ( 24 Apr 2025 05:28 )             29.1       04-24    139  |  97[L]  |  10  ----------------------------<  100[H]  3.7   |  26  |  1.26    Ca    10.0      24 Apr 2025 05:28  Phos  5.0     04-24  Mg     1.80     04-24        Urinalysis Basic - ( 24 Apr 2025 05:28 )    Color: x / Appearance: x / SG: x / pH: x  Gluc: 100 mg/dL / Ketone: x  / Bili: x / Urobili: x   Blood: x / Protein: x / Nitrite: x   Leuk Esterase: x / RBC: x / WBC x   Sq Epi: x / Non Sq Epi: x / Bacteria: x        MICROBIOLOGY:  Vancomycin Level, Random: <4.0 ug/mL (04-24-25 @ 05:28)  v  Tissue SACRAL WOUND AEBNDEMENT  04-15-25   Few Providencia stuartii  Rare Bacteroides vulgatus group "Susceptibilities not performed"  --  Providencia stuartii      Blood Blood-Venous  04-07-25   No growth at 5 days  --  --      Blood Blood-Peripheral  04-07-25   No growth at 5 days  --  --      Clean Catch Clean Catch (Midstream)  03-31-25   <10,000 CFU/mL Normal Urogenital Keysha  --  --      Blood Blood-Peripheral  03-30-25   No growth at 5 days  --  --                RADIOLOGY:

## 2025-04-24 NOTE — PROGRESS NOTE ADULT - ASSESSMENT
Assessment: 40 YO M with PMHx of morbid obesity, BEA on CPAP, pAFIB (not on AC), HTN, and BL papilledema attributed to pseudotumor cerebri who presented initially to [facility] on 2/24 with SOB and BL LE edema. Found with URI outpatient with progressive SOB, and concern for noted for MFPNA on imaging. Course progressed with AHRF with worsening O2 demand, respiratory distress, secretions, and somnolence requiring intubation (difficult with success after 6 attempts) in ED and admission to [facility] MICU. While in MICU, patient noted with increasing O2 demand with no improvement despite optimal vent management. Concern noted for progressive ARDS, unable to prone given morbid obesity, and ultimately cannulated for vvECMO on 2/25 and transferred to [facility] for further management on 2/26. While at [facility], tx with diuresis and ABX, and ultimately decannulated and s/p [id number] trach and PEG on 3/7. Patient ultimately transferred to [facility] on 3/11 and course complicated by recurrent high grade fevers and found with fungemia likely from panniculitis. Course further complicated by progression of sacrum ulcer with possible OM and s/p surgical debridement. Lastly course complicated by BL foot wounds, followed by podiatry with persistent pain, now improving.     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 associated dermatitis s/p OR debridement 4/15  -Intertriginous moisture associated dermatitis to bilateral gluteal folds and right anterior thigh/groin with recurrent denuded epidermis  -Risk of intertriginous dermatitis to remaining intertriginous folds  -LUQ surgical wound s/p PEG tube removal     Plan:  -Per records initially with deep tissue pressure injury --> evolved to unstageable pressure injury-->further evolved to stage 4 pressure injury (was receiving chemical debridement with Dakins solution), remains with increased moisture secondary to body habitus, fecal and urinary incontinence, history of critical illness (mechanical ventilation > 72 hours, sepsis, was on ECMO), and limited mobility.   -4/11, MRI Bony pelvis only without contrast- 1.  No osteomyelitis. 2.  Sacral decubitus wound is again seen extending superiorly along the superficial margin of the right gluteus zurdo to the level of L5 with small amount of fluid and gas within the tract. 3.  The underlying gluteus zurdo demonstrates a region of decreased enhancement consistent with ischemia or necrosis measuring up to 11.9 cm transversely.4.  Muscle edema suggests a nonspecific myositis.5.  Small bilateral hip joint effusions with mild synovitis is nonspecific.  -4/15, s/p OR debridement by general surgery  -4/15 sacral wound culture: No fungal tissue growth, Few Providencia stuartii Rare Bacteroides vulgatus group "Susceptibilities not performed". Organism Identification: Providencia stuartii  -No sharp debridement indicated today, areas of septation within slough  digitally, tunnel no longer isolated now with extensive undermining circumferentially deepest from 12 - 4 o'clock, able to palpable where undermining terminates (unable to do so prior) , tissue type seems to be improving/stable, bone palpable in close proximity, no obvious drainable collection  -Will continue to hold VAC veraflow - wound base remains friable but controlled. Will continue to monitor, may be VAC candidate at later time. NPWt/VAC therapy can be initiated at outpatient, if wound is not ready to initiate therapy prior to discharge  -ABx management per ID; WBC wnl, afebrile "Continue with Zosyn 3.375 g 8, plan on 6 week course for possible OM until 5/11."  -Topical recommendations:   ·	LUQ surgical wound previous site of PEG tube and right groin: Clean wound and periwound skin with NS. Pat dry. Cover with small silicone foam with border. Change every other day or prn if soiled   ·	Sacral/gluteal cleft to bilateral buttocks- Irrigate deep cavity and tunneling with Dakins solution 1/4 strength using peribottle or flushes, cleanse wound and satellite ulcerations to b/l buttocks with NS, Dry well. Apply Liquid barrier film to periwound skin. Apply TRIAD barrier paste to isolated ulcer overlying left buttock,  Apply Aquacel hydrofiber sheet to right buttock, pack sacral/gluteal cleft including deep tunnels with Dakins 1/4 strength moistened kerlix, leave at least 2" out at end to ensure full removal of packing with subsequent dressing changes. Cover entire area (sacrum and right buttock) with abdominal pad and tegaderm. Change twice a day and prn if soiled/compromised. Once dressing is in place and perineal care is provided, clean remaining skin with perineal spray, apply TRIAD moisture barrier cream to exposed skin every shift and prn.  ·	Bilateral abdominal pannus, bilateral groin: Cleanse with luke-warm soap and water, dry well. Apply Interdry textile sheeting, under intertriginous folds leaving 2 inches exposed at ends to wick, remove to wash & dry affected area, then replace. Individual sheeting may be used for up to 5 days unless soiled. Inspect skin daily.   -Continue to offload pressure; bariatric low airloss support surface, turn and position per protocol with use of fluidized positioning devices, continue incontinence and moisture care per protocol and use of single breathable incontinence pads, continue to offload heels with fluidized positioning devices/Ochoa-Lock positioning device (PS# [id number]). Continue use of bariatric seat cushion (people soft #[id number]) and limit sit time- no more than 2 consecutive hours at any given time.   -Continue Nutritional management as per RD recommendations- now receiving Efrain  -B/l feet management per Podiatry surgery   -Appreciate PT and safe patient handling    Upon discharge follow up at outpatient [facility]. [address] [place] [address]. [phone].    Findings and plan discussed with patient, patient's father at bedside and primary team. All questions and concerns addressed to meet patient and family's satisfaction.  Will continue to follow perioidcally while inpatient, please reconsult earlier as needed.  Remainder of care per primary team.  Thank you.    EDOUARD Driscoll, CWN   MS TEAMS    If after 4PM or before 7:30AM on Mon-Friday or weekend/holiday please contact general surgery for urgent matters.   Team A- [id number]   Team B- [id number]  For non-urgent matters e-mail [email or web address]    I spent 50 minutes face-to-face with this patient of which more than 50% of the time was spent counseling/coordinating care of this patient.

## 2025-04-24 NOTE — PROGRESS NOTE ADULT - SUBJECTIVE AND OBJECTIVE BOX
CHIEF COMPLAINT:Patient is a 39y old  Male who presents with a chief complaint of sob (02 Mar 2025 06:19)      INTERVAL EVENTS:     ROS: Seen by bedside during AM rounds     OBJECTIVE:  ICU Vital Signs Last 24 Hrs  T(C): 37 (24 Apr 2025 05:06), Max: 37.2 (23 Apr 2025 16:00)  T(F): 98.6 (24 Apr 2025 05:06), Max: 99 (23 Apr 2025 16:00)  HR: 106 (24 Apr 2025 05:06) (106 - 118)  BP: 155/104 (24 Apr 2025 05:06) (135/97 - 155/104)  BP(mean): 117 (24 Apr 2025 05:06) (117 - 117)  ABP: --  ABP(mean): --  RR: 16 (24 Apr 2025 05:06) (16 - 19)  SpO2: 100% (24 Apr 2025 05:06) (92% - 100%)    O2 Parameters below as of 24 Apr 2025 05:06  Patient On (Oxygen Delivery Method): BiPAP/CPAP              04-23 @ 07:01  -  04-24 @ 07:00  --------------------------------------------------------  IN: 0 mL / OUT: 450 mL / NET: -450 mL      CAPILLARY BLOOD GLUCOSE      POCT Blood Glucose.: 116 mg/dL (23 Apr 2025 21:27)      PHYSICAL EXAM:  General:   HEENT:   Lymph Nodes:  Neck:   Respiratory:   Cardiovascular:   Abdomen:   Extremities:   Skin:   Neurological:  Psychiatry:        HOSPITAL MEDICATIONS:  MEDICATIONS  (STANDING):  amLODIPine   Tablet 10 milliGRAM(s) Oral daily  apixaban 5 milliGRAM(s) Oral every 12 hours  bisacodyl 10 milliGRAM(s) Oral at bedtime  capsaicin HP 0.075% Cream 1 Application(s) Topical four times a day  chlorhexidine 2% Cloths 1 Application(s) Topical <User Schedule>  clotrimazole 1% Cream 1 Application(s) Topical two times a day  Dakins Solution - 1/4 Strength 1 Application(s) Topical two times a day  dextrose 50% Injectable 25 Gram(s) IV Push once  dextrose 50% Injectable 12.5 Gram(s) IV Push once  dextrose 50% Injectable 25 Gram(s) IV Push once  DULoxetine 60 milliGRAM(s) Oral daily  gabapentin 1200 milliGRAM(s) Oral every 8 hours  glucagon  Injectable 1 milliGRAM(s) IntraMuscular once  insulin lispro (ADMELOG) corrective regimen sliding scale   SubCutaneous three times a day before meals  insulin lispro (ADMELOG) corrective regimen sliding scale   SubCutaneous at bedtime  lidocaine   4% Patch 1 Patch Transdermal daily  lidocaine   4% Patch 1 Patch Transdermal daily  melatonin 3 milliGRAM(s) Oral at bedtime  multivitamin 1 Tablet(s) Oral daily  mupirocin 2% Ointment 1 Application(s) Topical two times a day  naloxegol 25 milliGRAM(s) Oral daily  pantoprazole    Tablet 40 milliGRAM(s) Oral before breakfast  piperacillin/tazobactam IVPB.. 3.375 Gram(s) IV Intermittent every 8 hours  polyethylene glycol 3350 17 Gram(s) Oral two times a day  QUEtiapine 25 milliGRAM(s) Oral at bedtime  sevelamer carbonate 800 milliGRAM(s) Oral three times a day with meals    MEDICATIONS  (PRN):  acetaminophen     Tablet .. 650 milliGRAM(s) Oral every 6 hours PRN Temp greater or equal to 38C (100.4F), Mild Pain (1 - 3), Moderate Pain (4 - 6)  albuterol/ipratropium for Nebulization 3 milliLiter(s) Nebulizer every 6 hours PRN Shortness of Breath and/or Wheezing  cyclobenzaprine 5 milliGRAM(s) Oral three times a day PRN Muscle Spasm  dextrose Oral Gel 15 Gram(s) Oral once PRN Blood Glucose LESS THAN 70 milliGRAM(s)/deciliter  oxyCODONE    IR 5 milliGRAM(s) Oral two times a day PRN Severe Pain (7 - 10) related to dressing change  oxyCODONE    IR 15 milliGRAM(s) Oral every 6 hours PRN Severe Pain (7 - 10)      LABS:                        8.7    7.72  )-----------( 344      ( 24 Apr 2025 05:28 )             29.1     04-24    139  |  97[L]  |  10  ----------------------------<  100[H]  3.7   |  26  |  1.26    Ca    10.0      24 Apr 2025 05:28  Phos  5.0     04-24  Mg     1.80     04-24        Urinalysis Basic - ( 24 Apr 2025 05:28 )    Color: x / Appearance: x / SG: x / pH: x  Gluc: 100 mg/dL / Ketone: x  / Bili: x / Urobili: x   Blood: x / Protein: x / Nitrite: x   Leuk Esterase: x / RBC: x / WBC x   Sq Epi: x / Non Sq Epi: x / Bacteria: x         CHIEF COMPLAINT:Patient is a 39y old  Male who presents with a chief complaint of sob (02 Mar 2025 06:19)      INTERVAL EVENTS:     ROS: Seen by bedside during AM rounds     OBJECTIVE:  ICU Vital Signs Last 24 Hrs  T(C): 37 (24 Apr 2025 05:06), Max: 37.2 (23 Apr 2025 16:00)  T(F): 98.6 (24 Apr 2025 05:06), Max: 99 (23 Apr 2025 16:00)  HR: 106 (24 Apr 2025 05:06) (106 - 118)  BP: 155/104 (24 Apr 2025 05:06) (135/97 - 155/104)  BP(mean): 117 (24 Apr 2025 05:06) (117 - 117)  ABP: --  ABP(mean): --  RR: 16 (24 Apr 2025 05:06) (16 - 19)  SpO2: 100% (24 Apr 2025 05:06) (92% - 100%)    O2 Parameters below as of 24 Apr 2025 05:06  Patient On (Oxygen Delivery Method): BiPAP/CPAP              04-23 @ 07:01  -  04-24 @ 07:00  --------------------------------------------------------  IN: 0 mL / OUT: 450 mL / NET: -450 mL      CAPILLARY BLOOD GLUCOSE      POCT Blood Glucose.: 116 mg/dL (23 Apr 2025 21:27)      PHYSICAL EXAM:  General: NAD   Neck: Trachea midline.   Cards: S1/S2, no murmurs   Pulm: CTA bilaterally. No wheezes.   Abdomen: Morbidly obese abdomen. Soft, NTND. Prior G tube site witih occlusive overlying dressing.   Extremities: Mild b/l LE edema. Extremities warm to touch.  Neurology: AOx3. 5/5 motor strength b/l UE. 3-4/5 motor strength b/l LE (improved from prior eval). Following commands.   Skin: warm to touch, color appropriate for ethnicity. Refer to RN assessment for further details.          HOSPITAL MEDICATIONS:  MEDICATIONS  (STANDING):  amLODIPine   Tablet 10 milliGRAM(s) Oral daily  apixaban 5 milliGRAM(s) Oral every 12 hours  bisacodyl 10 milliGRAM(s) Oral at bedtime  capsaicin HP 0.075% Cream 1 Application(s) Topical four times a day  chlorhexidine 2% Cloths 1 Application(s) Topical <User Schedule>  clotrimazole 1% Cream 1 Application(s) Topical two times a day  Dakins Solution - 1/4 Strength 1 Application(s) Topical two times a day  dextrose 50% Injectable 25 Gram(s) IV Push once  dextrose 50% Injectable 12.5 Gram(s) IV Push once  dextrose 50% Injectable 25 Gram(s) IV Push once  DULoxetine 60 milliGRAM(s) Oral daily  gabapentin 1200 milliGRAM(s) Oral every 8 hours  glucagon  Injectable 1 milliGRAM(s) IntraMuscular once  insulin lispro (ADMELOG) corrective regimen sliding scale   SubCutaneous three times a day before meals  insulin lispro (ADMELOG) corrective regimen sliding scale   SubCutaneous at bedtime  lidocaine   4% Patch 1 Patch Transdermal daily  lidocaine   4% Patch 1 Patch Transdermal daily  melatonin 3 milliGRAM(s) Oral at bedtime  multivitamin 1 Tablet(s) Oral daily  mupirocin 2% Ointment 1 Application(s) Topical two times a day  naloxegol 25 milliGRAM(s) Oral daily  pantoprazole    Tablet 40 milliGRAM(s) Oral before breakfast  piperacillin/tazobactam IVPB.. 3.375 Gram(s) IV Intermittent every 8 hours  polyethylene glycol 3350 17 Gram(s) Oral two times a day  QUEtiapine 25 milliGRAM(s) Oral at bedtime  sevelamer carbonate 800 milliGRAM(s) Oral three times a day with meals    MEDICATIONS  (PRN):  acetaminophen     Tablet .. 650 milliGRAM(s) Oral every 6 hours PRN Temp greater or equal to 38C (100.4F), Mild Pain (1 - 3), Moderate Pain (4 - 6)  albuterol/ipratropium for Nebulization 3 milliLiter(s) Nebulizer every 6 hours PRN Shortness of Breath and/or Wheezing  cyclobenzaprine 5 milliGRAM(s) Oral three times a day PRN Muscle Spasm  dextrose Oral Gel 15 Gram(s) Oral once PRN Blood Glucose LESS THAN 70 milliGRAM(s)/deciliter  oxyCODONE    IR 5 milliGRAM(s) Oral two times a day PRN Severe Pain (7 - 10) related to dressing change  oxyCODONE    IR 15 milliGRAM(s) Oral every 6 hours PRN Severe Pain (7 - 10)      LABS:                        8.7    7.72  )-----------( 344      ( 24 Apr 2025 05:28 )             29.1     04-24    139  |  97[L]  |  10  ----------------------------<  100[H]  3.7   |  26  |  1.26    Ca    10.0      24 Apr 2025 05:28  Phos  5.0     04-24  Mg     1.80     04-24        Urinalysis Basic - ( 24 Apr 2025 05:28 )    Color: x / Appearance: x / SG: x / pH: x  Gluc: 100 mg/dL / Ketone: x  / Bili: x / Urobili: x   Blood: x / Protein: x / Nitrite: x   Leuk Esterase: x / RBC: x / WBC x   Sq Epi: x / Non Sq Epi: x / Bacteria: x         CHIEF COMPLAINT:Patient is a 39y old  Male who presents with a chief complaint of sob (02 Mar 2025 06:19)      INTERVAL EVENTS: no overnight events noted. Reports his LE pain is more manageable.    ROS: Seen by bedside during AM rounds     OBJECTIVE:  ICU Vital Signs Last 24 Hrs  T(C): 37 (24 Apr 2025 05:06), Max: 37.2 (23 Apr 2025 16:00)  T(F): 98.6 (24 Apr 2025 05:06), Max: 99 (23 Apr 2025 16:00)  HR: 106 (24 Apr 2025 05:06) (106 - 118)  BP: 155/104 (24 Apr 2025 05:06) (135/97 - 155/104)  BP(mean): 117 (24 Apr 2025 05:06) (117 - 117)  ABP: --  ABP(mean): --  RR: 16 (24 Apr 2025 05:06) (16 - 19)  SpO2: 100% (24 Apr 2025 05:06) (92% - 100%)    O2 Parameters below as of 24 Apr 2025 05:06  Patient On (Oxygen Delivery Method): BiPAP/CPAP              04-23 @ 07:01  -  04-24 @ 07:00  --------------------------------------------------------  IN: 0 mL / OUT: 450 mL / NET: -450 mL      CAPILLARY BLOOD GLUCOSE      POCT Blood Glucose.: 116 mg/dL (23 Apr 2025 21:27)      PHYSICAL EXAM:  General: NAD   Neck: Trachea midline.   Cards: S1/S2, no murmurs   Pulm: CTA bilaterally. No wheezes.   Abdomen: Morbidly obese abdomen. Soft, NTND. Prior G tube site witih occlusive overlying dressing.   Extremities: Mild b/l LE edema. Extremities warm to touch.  Neurology: AOx3. 5/5 motor strength b/l UE. 3-4/5 motor strength b/l LE (improved from prior eval). Following commands.   Skin: warm to touch, color appropriate for ethnicity. Refer to RN assessment for further details.          HOSPITAL MEDICATIONS:  MEDICATIONS  (STANDING):  amLODIPine   Tablet 10 milliGRAM(s) Oral daily  apixaban 5 milliGRAM(s) Oral every 12 hours  bisacodyl 10 milliGRAM(s) Oral at bedtime  capsaicin HP 0.075% Cream 1 Application(s) Topical four times a day  chlorhexidine 2% Cloths 1 Application(s) Topical <User Schedule>  clotrimazole 1% Cream 1 Application(s) Topical two times a day  Dakins Solution - 1/4 Strength 1 Application(s) Topical two times a day  dextrose 50% Injectable 25 Gram(s) IV Push once  dextrose 50% Injectable 12.5 Gram(s) IV Push once  dextrose 50% Injectable 25 Gram(s) IV Push once  DULoxetine 60 milliGRAM(s) Oral daily  gabapentin 1200 milliGRAM(s) Oral every 8 hours  glucagon  Injectable 1 milliGRAM(s) IntraMuscular once  insulin lispro (ADMELOG) corrective regimen sliding scale   SubCutaneous three times a day before meals  insulin lispro (ADMELOG) corrective regimen sliding scale   SubCutaneous at bedtime  lidocaine   4% Patch 1 Patch Transdermal daily  lidocaine   4% Patch 1 Patch Transdermal daily  melatonin 3 milliGRAM(s) Oral at bedtime  multivitamin 1 Tablet(s) Oral daily  mupirocin 2% Ointment 1 Application(s) Topical two times a day  naloxegol 25 milliGRAM(s) Oral daily  pantoprazole    Tablet 40 milliGRAM(s) Oral before breakfast  piperacillin/tazobactam IVPB.. 3.375 Gram(s) IV Intermittent every 8 hours  polyethylene glycol 3350 17 Gram(s) Oral two times a day  QUEtiapine 25 milliGRAM(s) Oral at bedtime  sevelamer carbonate 800 milliGRAM(s) Oral three times a day with meals    MEDICATIONS  (PRN):  acetaminophen     Tablet .. 650 milliGRAM(s) Oral every 6 hours PRN Temp greater or equal to 38C (100.4F), Mild Pain (1 - 3), Moderate Pain (4 - 6)  albuterol/ipratropium for Nebulization 3 milliLiter(s) Nebulizer every 6 hours PRN Shortness of Breath and/or Wheezing  cyclobenzaprine 5 milliGRAM(s) Oral three times a day PRN Muscle Spasm  dextrose Oral Gel 15 Gram(s) Oral once PRN Blood Glucose LESS THAN 70 milliGRAM(s)/deciliter  oxyCODONE    IR 5 milliGRAM(s) Oral two times a day PRN Severe Pain (7 - 10) related to dressing change  oxyCODONE    IR 15 milliGRAM(s) Oral every 6 hours PRN Severe Pain (7 - 10)      LABS:                        8.7    7.72  )-----------( 344      ( 24 Apr 2025 05:28 )             29.1     04-24    139  |  97[L]  |  10  ----------------------------<  100[H]  3.7   |  26  |  1.26    Ca    10.0      24 Apr 2025 05:28  Phos  5.0     04-24  Mg     1.80     04-24        Urinalysis Basic - ( 24 Apr 2025 05:28 )    Color: x / Appearance: x / SG: x / pH: x  Gluc: 100 mg/dL / Ketone: x  / Bili: x / Urobili: x   Blood: x / Protein: x / Nitrite: x   Leuk Esterase: x / RBC: x / WBC x   Sq Epi: x / Non Sq Epi: x / Bacteria: x

## 2025-04-24 NOTE — PROGRESS NOTE ADULT - ASSESSMENT
This is a 38 y/o M w/ PMHx AF (not on AC), HTN, BEA, pseudotumor cerebri s/p LP in 2024, initially presented to [place] on 2/24, SOB, LE edema with URI symptoms and sick contacts, noted to have severe ARDS, intubated however still hypoxia, cannulated for VV ECMO on 2/25, transferred to [facility] on 2/25, started on empiric Vanco, Zosyn for multifocal PNA, abdominal wall cellulitis, s/p trach placement by CT surgery on 3/7, ECMO decannulated on 3/7. Zosyn held on 3/9.  C/b fevers on 3/10, restarted on Zosyn, transferred to [facility] on 3/13, Bcx now w/ yeast.    #Sacral ulcer, with extensive tunneling, no OM on MRI, however c/f OM on WBC scan  #Candida albicans fungemia   #Fevers   #Multifocal PNA, abdominal wall cellulitis s/p Vanco/Zosyn  #ARDS, hypoxic respiratory failure requiring VV EMCO 2/2 multifocal PNA? s/p decannulation on 3/7    Overall, 38 y/o M w/ PMHx AF (not on AC), HTN, BEA, pseudotumor cerebri s/p LP in 2024 admitted to [facility] on 2/26 for ARDS, hypoxic respiratory failure requiring VV EMCO 2/2 multifocal PNA? s/p decannulation on 3/7, c/b abdominal wall cellulitis s/p Vancomycin/Zosyn, s/p trach on 3/7, in the setting of persistent fevers despite Zosyn, BCx now w/ yeast, Candida albicans   Unclear source for yeast in BCx, pt had all central lines removed on 3/7, not getting TPN, no abdominal pain on exam, PEG site well appearing.     CT A/P w/o clear abdominal source of fungemia, possibly 2/2 abdominal wall cellulitis? TTE w/o IE.  BCx 3/18 1/2 positive, 3/20 NGTD x 2.     Pt with worsening sepsis on 3/30, febrile to 103, WBC 16.   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   Pt with deep tunneled wound to left, no drainage, malodor per wound care.   CT A/P w/ b/l pulmonary opacities but improved, no sacral abscess   WBC scan with soft tissue infection R side of pelvis, intense activity in R iliac bone possible OM. Lateral R forefoot with soft tissue infection, possible OM?  Wound examined with wound care, large R buttocks soft tissue ulcer, does not look infected, however deep tunneling with murky fluid, likely source of infection   MRI w/o OM, however given WBC scan findings, will still treat for 6 weeks     S/p PEG removal and sacral ulcer debridement, gram stain w/ providenciae, bacteroides     Recommendations;   1. Continue with Zosyn 3.375 g 8, plan on 6 week course for possible OM until 5/11.   2. S/p Caspofungin 70 mg daily, 4 week course ended 4/15    Thank you for consulting us and involving us in the management of this patient's case. In addition to reviewing history, imaging, documents, labs, microbiology, and infection control strategies and potential issues.     ID will continue to follow    Shailesh Owens M.D.  Attending Physician  Division of Infectious Diseases  Department of Medicine    Please contact through MS Teams message.  Office: [phone] (after 5 PM or weekend)

## 2025-04-24 NOTE — PROGRESS NOTE ADULT - ASSESSMENT
40 YO M with PMHx of morbid obesity, BEA on CPAP, pAFIB (not on AC), HTN, and BL papilledema attributed to pseudotumor cerebri who presented initially to [facility] on 2/24 with SOB and BL LE edema. Found with URI outpatient with progressive SOB, and concern for noted for MFPNA on imaging. Course progressed with AHRF with worsening O2 demand, respiratory distress, secretions, and somnolence requiring intubation (difficult with success after 6 attempts) in ED and admission to [facility] MICU. While in MICU, patient noted with increasing O2 demand with no improvement despite optimal vent management. Concern noted for progressive ARDS, unable to prone given morbid obesity, and ultimately cannulated for vvECMO on 2/25 and transferred to [facility] for further management on 2/26. While at [facility], tx with diuresis and ABX, and ultimately decannulated and s/p 60XLTCP trach and PEG on 3/7. Patient ultimately transferred to RCU on 3/11 and course complicated by recurrent high grade fevers and found with fungemia likely from panniculitis. Course further complicated by progression of sacrum ulcer with possible OM and s/p surgical debridement. Lastly course complicated by BL feet pressor wound and CIPN. Pain medications continued and podiatry recc local wound care.     NEUROLOGY  # Hx of Pseudotumor Cerebri   - s/p LP in 2024 with high opening pressure  - s/p MRI 2024 with possible pituitary mass but unclear because of pressure effect from pseudotumor cerebri causing partially empty sella.  - Prolactin elevated   - ACTH low but received steroids during admission   - FT4 low normal and TSH normal, but given FT4 low normal TSH should be higher and concern for likely inadequate response.   - Endo work up outpatient.     # Sedation   - Weaned off sedation in ICU   - Nimbex turned off 2/27, Prop turned off 3/9   - Precedex turned off and catapres started 3/11  - Catapres weaned off on 4/3     # Insomnia   - Patient worked night shift x 15 years   - Trouble sleeping at night leading to day time sleepiness and poor participation with PT and continued on seroquel 25 QHS with improvement.     # BL LE neuropathy   - CIPN concern   - Continue on Oxy 5mg PRN BID  - Continue on Gabapentin to 1200mg TID  - Continue on Cymbalta 60 mg QD   - Continue on Flexeril 5mg Q8H PRN  - Pain management following     CARDIOVASCULAR  # HTN   - HTN noted in ICU requiring Cardene and esmolol GTTs   - Catapres turned off as above  - Continue on Norvasc 10   - Monitor BP     # AFIB   - Hx of pAFIB   - No RVR episodes or pAFIB episodes in ICU   - No rate control medications needed  - Monitor on telemetry     # RV dilation second to PHTN   - POCUS on arrival to [facility] with RVE concern   - TTE 10/2024 with EF 55-60 with normal LVSF, moderate LVH and normal RVSF and size with no concern for pHTN   - TTE on 2/25 at  with EF 61 and normal LVSF, but enlarged RV size with reduced RVSF, mild TR, mild PHTN with PASP 49, and dilated IVC to 3.4cn, but normal TAPSE 2.1.  - Continue on aggressive diuresis in ICU    - TTE 3/11 with RVSF reduced with TAPSE 1.6 and IVC improved to 2.12cm.   - Continue on lasix 40 PO QD , however dc'ed on 4/7 second to ELLA.   - Monitor volume status    RESPIRATORY  # ARDS second to MFPNA   - Hx of BEA on CPAP presented to  post URI with SOB and found with AHRF with progression to ARDS second to post viral MFPNA   - Intubated 2/25   - Cannulated for vvECMO 2/26   - s/p 60XLTCP tracheostomy 3/7   - s/p ECMO decannulation 3/7   - s/p trach decannulation on 3/28  - [id number] RA with BIPAP 18/10 QHS    GI  # Dysphagia   - s/p PEG 3/7   - s/p FEEST 3/20 and passed   - s/p PEG removal on 4/15     # Constipation   - Continue on miralax BID   - Continue on movantik and dulcolax   - Monitor BMs    # Mild transaminitis   - LFTs elevated in ICU with TBILI elevated likely from ECMO hemolysis   - US ABD with hepatic steatosis   - Trend LFTs      RENAL  # ELLA   - ELLA likely from cardiorenal with RVE   - Diuresed in ICU and improved   - Monitor renal function and UOP   - Recurrent ELLA noted with suspected vancomycin toxicity. Lasix and vanco stopped and monitoring renal function and UOP.     # Hypernatremia   - Hypernatremia with fever spikes   - Fever curve improved and weaned off FWF    INFECTIOUS DISEASE  # Recurrent fever second to OM?   - Persistent fevers despite fungemia tx as below   - RPT panCT with no source   - Bone scan 4/8 with LARGE soft tissue infection in the R side of the pelvis and slightly more intense in the right iliac bone with concern for possible oM .   - MRI PELVIS 4/11 with large wound but no OM   - Case discussed at length with ID and sx and s/p surgical wound debridement on 4/15   - Wound cx with Providencia and Bacteroides   - Holding further vanco given ELLA and no MRSA  - Continue on zosyn through 5/11 for 6 week course     # Fungemia second to panniculitis   - Recurrent fevers noted.   - RPT SCx, UA, CXR and RVP negative  - TTE 3/17 with no IE  - PanCT 3/17 with PNA and persistent panniculitis  - BCx 3/15 and 3/16 with candida albicans  - RPT BCx 3/18 and 3/20 negative     # Recurrent fevers   - Recurrent fevers post ECMO decannulation thought initially to be cytokine surge   - BCx, SCx, UCx, RVP and MRSA on 3/12 negative   - Completed zosyn (3/12-3/18) empirically with improved WBC   - Completed caspo 3/15-4/15    # MFPNA and abdominal pannus cellulitis   - Presented to  post URI with SOB and found with AHRF with progression to ARDS second to post viral MFPNA   - RVP, legionella, strept, BAL, BCx, UCx, HIV, PCP, and adenovirus PCR negative   - Initially started on multiple agents in ICU and ultimately completed zosyn (2/26-3/9) ZMAX (2/25-2/26) and Vanco (2/26-3/1)     # Penile discharge   - GC/ chlamydia negative  - HIV negative   - Monitor for now    # PPX   - MRSA PCR positive and completed Bactroban (2/26-3/3)     HEME   # Anemia likely second to ECMO circuit vs AOCD   - HH low in ICU second to ECMO circuit   - Anemia panel with AOCD  - Trend HH     VASCULAR   # R IJ and BL LE DVTs   - Post ECMO dopplers on 3/9 negative for BL UE/ LE DVTs.   - Subsequent post ECMO dopplers performed on 3/14 and noted with acute, non-occlusive deep vein thrombosis noted within the right internal jugular vein. Acute, occlusive deep vein thromboses noted within the right and left peroneal veins at both mid calves, and age-indeterminate, occlusive deep vein thrombosis visualized within the left gastrocnemius vein at the proximal calf.     - s/p argatroban GTT   - Continue on Eliquis     PODIATRY   # BL plantar feet blisters likely pressors induced  - Seen by podiatry and blisters lanced on 3/4, 3/18 and 3/28  - Continue on local wound care and WBAT per podiatry     ENDOCRINE  # Pre-DM2   - A1C 6.7%  - Continue on ISS, Monitor FS   - IF no demand then stop ISS     LINES/ TUBES  - R IJ and R FEM ECMO (2/26-3/7)     SKIN  # Pannus canndial intertrigo   - Continue on local wound care     # Sacrum/ buttock MAD  - Surgical debridement of sacrum on 4/15  - Deep tunnelling remains as of 4/21   - Continue on local wound care  - May need wound vacc outpatient    ETHICS/ GOC    - FULL CODE     DISPO - PT and PMR following and goal for acute  40 YO M with PMHx of morbid obesity, BEA on CPAP, pAFIB (not on AC), HTN, and BL papilledema attributed to pseudotumor cerebri who presented initially to [facility] on 2/24 with SOB and BL LE edema. Found with URI outpatient with progressive SOB, and concern for noted for MFPNA on imaging. Course progressed with AHRF with worsening O2 demand, respiratory distress, secretions, and somnolence requiring intubation (difficult with success after 6 attempts) in ED and admission to [facility] MICU. While in MICU, patient noted with increasing O2 demand with no improvement despite optimal vent management. Concern noted for progressive ARDS, unable to prone given morbid obesity, and ultimately cannulated for vvECMO on 2/25 and transferred to [facility] for further management on 2/26. While at [facility], tx with diuresis and ABX, and ultimately decannulated and s/p 60XLTCP trach and PEG on 3/7. Patient ultimately transferred to RCU on 3/11 and course complicated by recurrent high grade fevers and found with fungemia likely from panniculitis. Course further complicated by progression of sacrum ulcer with possible OM and s/p surgical debridement. Lastly course complicated by BL feet pressor wound and CIPN. Pain medications continued and podiatry recc local wound care.     NEUROLOGY  # Hx of Pseudotumor Cerebri   - s/p LP in 2024 with high opening pressure  - s/p MRI 2024 with possible pituitary mass but unclear because of pressure effect from pseudotumor cerebri causing partially empty sella.  - Prolactin elevated   - ACTH low but received steroids during admission   - FT4 low normal and TSH normal, but given FT4 low normal TSH should be higher and concern for likely inadequate response.   - Endo work up outpatient.     # Sedation   - Weaned off sedation in ICU   - Nimbex turned off 2/27, Prop turned off 3/9   - Precedex turned off and catapres started 3/11  - Catapres weaned off on 4/3     # Insomnia   - Patient worked night shift x 15 years   - Trouble sleeping at night leading to day time sleepiness and poor participation with PT and continued on seroquel 25 QHS with improvement.     # BL LE neuropathy   - CIPN concern   - Continue on Oxy 5mg PRN BID  - Continue on Gabapentin to 1200mg TID  - Continue on Cymbalta 60 mg QD   - Continue on Flexeril 5mg Q8H PRN  - Pain management following     CARDIOVASCULAR  # HTN   - HTN noted in ICU requiring Cardene and esmolol GTTs   - Catapres turned off as above  - Continue on Norvasc 10   - Monitor BP     # AFIB   - Hx of pAFIB   - No RVR episodes or pAFIB episodes in ICU   - No rate control medications needed  - Monitor on telemetry     # RV dilation second to PHTN   - POCUS on arrival to [facility] with RVE concern   - TTE 10/2024 with EF 55-60 with normal LVSF, moderate LVH and normal RVSF and size with no concern for pHTN   - TTE on 2/25 at  with EF 61 and normal LVSF, but enlarged RV size with reduced RVSF, mild TR, mild PHTN with PASP 49, and dilated IVC to 3.4cn, but normal TAPSE 2.1.  - Continue on aggressive diuresis in ICU    - TTE 3/11 with RVSF reduced with TAPSE 1.6 and IVC improved to 2.12cm.   - Continue on lasix 40 PO QD , however dc'ed on 4/7 second to ELLA.   - Monitor volume status    RESPIRATORY  # ARDS second to MFPNA   - Hx of BEA on CPAP presented to  post URI with SOB and found with AHRF with progression to ARDS second to post viral MFPNA   - Intubated 2/25   - Cannulated for vvECMO 2/26   - s/p 60XLTCP tracheostomy 3/7   - s/p ECMO decannulation 3/7   - s/p trach decannulation on 3/28  - [id number] RA with BIPAP 18/10 QHS    GI  # Dysphagia   - s/p PEG 3/7   - s/p FEEST 3/20 and passed   - s/p PEG removal on 4/15     # Constipation   - Continue on miralax BID   - Continue on movantik and dulcolax   - Monitor BMs    # Mild transaminitis   - LFTs elevated in ICU with TBILI elevated likely from ECMO hemolysis   - US ABD with hepatic steatosis   - Trend LFTs      RENAL  # ELLA   - ELLA likely from cardiorenal with RVE   - Diuresed in ICU and improved   - Monitor renal function and UOP   - Recurrent ELLA noted with suspected vancomycin toxicity. Lasix and vanco stopped and now renal function normalized.     # Hypernatremia   - Hypernatremia with fever spikes   - Fever curve improved and weaned off FWF    INFECTIOUS DISEASE  # Recurrent fever second to OM?   - Persistent fevers despite fungemia tx as below   - RPT panCT with no source   - Bone scan 4/8 with LARGE soft tissue infection in the R side of the pelvis and slightly more intense in the right iliac bone with concern for possible oM .   - MRI PELVIS 4/11 with large wound but no OM   - Case discussed at length with ID and sx and s/p surgical wound debridement on 4/15   - Wound cx with Providencia and Bacteroides   - Holding further vanco given ELLA and no MRSA  - Continue on zosyn through 5/11 for 6 week course     # Fungemia second to panniculitis   - Recurrent fevers noted.   - RPT SCx, UA, CXR and RVP negative  - TTE 3/17 with no IE  - PanCT 3/17 with PNA and persistent panniculitis  - BCx 3/15 and 3/16 with candida albicans  - RPT BCx 3/18 and 3/20 negative     # Recurrent fevers   - Recurrent fevers post ECMO decannulation thought initially to be cytokine surge   - BCx, SCx, UCx, RVP and MRSA on 3/12 negative   - Completed zosyn (3/12-3/18) empirically with improved WBC   - Completed caspo 3/15-4/15    # MFPNA and abdominal pannus cellulitis   - Presented to  post URI with SOB and found with AHRF with progression to ARDS second to post viral MFPNA   - RVP, legionella, strept, BAL, BCx, UCx, HIV, PCP, and adenovirus PCR negative   - Initially started on multiple agents in ICU and ultimately completed zosyn (2/26-3/9) ZMAX (2/25-2/26) and Vanco (2/26-3/1)     # Penile discharge   - GC/ chlamydia negative  - HIV negative   - Monitor for now    # PPX   - MRSA PCR positive and completed Bactroban (2/26-3/3)     HEME   # Anemia likely second to ECMO circuit vs AOCD   - HH low in ICU second to ECMO circuit   - Anemia panel with AOCD  - Trend HH     VASCULAR   # R IJ and BL LE DVTs   - Post ECMO dopplers on 3/9 negative for BL UE/ LE DVTs.   - Subsequent post ECMO dopplers performed on 3/14 and noted with acute, non-occlusive deep vein thrombosis noted within the right internal jugular vein. Acute, occlusive deep vein thromboses noted within the right and left peroneal veins at both mid calves, and age-indeterminate, occlusive deep vein thrombosis visualized within the left gastrocnemius vein at the proximal calf.     - s/p argatroban GTT   - Continue on Eliquis     PODIATRY   # BL plantar feet blisters likely pressors induced  - Seen by podiatry and blisters lanced on 3/4, 3/18 and 3/28  - Continue on local wound care and WBAT per podiatry     ENDOCRINE  # Pre-DM2   - A1C 6.7%  - Continue on ISS, Monitor FS   - IF no demand then stop ISS     LINES/ TUBES  - R IJ and R FEM ECMO (2/26-3/7)     SKIN  # Pannus canndial intertrigo   - Continue on local wound care     # Sacrum/ buttock MAD  - Surgical debridement of sacrum on 4/15  - Deep tunnelling remains as of 4/21   - Continue on local wound care  - May need wound vacc outpatient    ETHICS/ GOC    - FULL CODE     DISPO - PT and PMR following and goal for acute

## 2025-04-24 NOTE — PROGRESS NOTE ADULT - NS ATTEND AMEND GEN_ALL_CORE FT
38 yo M w/ class III obesity, pAfib (no AC), HTN, BEA (on CPAP), history of b/l papilledema attributed to pseudotumor cerebri, who is transferred from [place] on 2/26 for VV ECMO 2/2 ARDS.     Patient transferred to [facility] for VV ECMO. S/P diiuresis, TTE/ROBERT with RV failure, s/p treatment of pneumonia. Patient s/p trach and peg. Course complicated by Fungemia s/p course of caspo, also with osteomyelitis s/p surgical debridement. Had critical illness polyneuropathy due to critical illness. With now neuropathic pain. Now decannulated trach and on PO diet.     Doing better. Standing. Pain is better controlled.     # acute hypoxemic respiratory failure- resolved.  # RV failure  # oropharyngeal dysphagia- resolved.   # Critical illness neuropathy  # afib  # DVT  # Osteomyelitis  # Fungemia  - ARDS s/p VV ecmo. Trach decannualted. On room air.   - OOB, ambulate as tolerated, aggressive PT  - Passes s/s eval, now on PO diet.  - S/p course of antifungals. Pending continued zosyn for osteo s/p OR. C/W wound care.   - DVT noted on repeat duplex. C/W full ac   - C/W full a/c for now. Hx of afib, [id number] of 2  - Pain 2/2 to likely critical illness neuropathy. On gabapentin, cymbalta, flexiril. Pain consulted but given neuropathic less role for opioids   - C/W PO oxycodone, bowel regiment. No longer requiring IV pain medications.   - DVT ppx- Full A/C  - Dispo- full code. Aggressive PT eval. OOB to chair. Hopefully d/c to acute rehab.

## 2025-04-25 ENCOUNTER — TRANSCRIPTION ENCOUNTER (OUTPATIENT)
Age: 40
End: 2025-04-25

## 2025-04-25 ENCOUNTER — INPATIENT (INPATIENT)
Facility: HOSPITAL | Age: 40
LOS: 48 days | Discharge: HOME | DRG: 93 | End: 2025-06-13
Attending: INTERNAL MEDICINE | Admitting: INTERNAL MEDICINE
Payer: COMMERCIAL

## 2025-04-25 VITALS
OXYGEN SATURATION: 94 % | DIASTOLIC BLOOD PRESSURE: 84 MMHG | HEIGHT: 70 IN | RESPIRATION RATE: 16 BRPM | WEIGHT: 315 LBS | TEMPERATURE: 99 F | SYSTOLIC BLOOD PRESSURE: 135 MMHG | HEART RATE: 113 BPM

## 2025-04-25 VITALS — OXYGEN SATURATION: 94 % | HEART RATE: 104 BPM

## 2025-04-25 DIAGNOSIS — R53.81 OTHER MALAISE: ICD-10-CM

## 2025-04-25 LAB
GLUCOSE BLDC GLUCOMTR-MCNC: 100 MG/DL — HIGH (ref 70–99)
GLUCOSE BLDC GLUCOMTR-MCNC: 101 MG/DL — HIGH (ref 70–99)
GLUCOSE BLDC GLUCOMTR-MCNC: 108 MG/DL — HIGH (ref 70–99)
GLUCOSE BLDC GLUCOMTR-MCNC: 97 MG/DL — SIGNIFICANT CHANGE UP (ref 70–99)
SARS-COV-2 RNA SPEC QL NAA+PROBE: SIGNIFICANT CHANGE UP

## 2025-04-25 PROCEDURE — 99232 SBSQ HOSP IP/OBS MODERATE 35: CPT

## 2025-04-25 PROCEDURE — G0545: CPT

## 2025-04-25 RX ORDER — BISACODYL 5 MG
10 TABLET, DELAYED RELEASE (ENTERIC COATED) ORAL AT BEDTIME
Refills: 0 | Status: DISCONTINUED | OUTPATIENT
Start: 2025-04-25 | End: 2025-06-13

## 2025-04-25 RX ORDER — OXYCODONE HYDROCHLORIDE 30 MG/1
15 TABLET ORAL EVERY 6 HOURS
Refills: 0 | Status: DISCONTINUED | OUTPATIENT
Start: 2025-04-25 | End: 2025-05-02

## 2025-04-25 RX ORDER — INFLUENZA A VIRUS A/IDAHO/07/2018 (H1N1) ANTIGEN (MDCK CELL DERIVED, PROPIOLACTONE INACTIVATED, INFLUENZA A VIRUS A/INDIANA/08/2018 (H3N2) ANTIGEN (MDCK CELL DERIVED, PROPIOLACTONE INACTIVATED), INFLUENZA B VIRUS B/SINGAPORE/INFTT-16-0610/2016 ANTIGEN (MDCK CELL DERIVED, PROPIOLACTONE INACTIVATED), INFLUENZA B VIRUS B/IOWA/06/2017 ANTIGEN (MDCK CELL DERIVED, PROPIOLACTONE INACTIVATED) 15; 15; 15; 15 UG/.5ML; UG/.5ML; UG/.5ML; UG/.5ML
0.5 INJECTION, SUSPENSION INTRAMUSCULAR ONCE
Refills: 0 | Status: DISCONTINUED | OUTPATIENT
Start: 2025-04-25 | End: 2025-06-13

## 2025-04-25 RX ORDER — MAGNESIUM SULFATE 500 MG/ML
1 SYRINGE (ML) INJECTION ONCE
Refills: 0 | Status: DISCONTINUED | OUTPATIENT
Start: 2025-04-25 | End: 2025-04-25

## 2025-04-25 RX ORDER — NALOXEGOL OXALATE 12.5 MG/1
1 TABLET, FILM COATED ORAL
Qty: 0 | Refills: 0 | DISCHARGE
Start: 2025-04-25

## 2025-04-25 RX ORDER — LIDOCAINE HYDROCHLORIDE 20 MG/ML
1 JELLY TOPICAL
Qty: 0 | Refills: 0 | DISCHARGE
Start: 2025-04-25

## 2025-04-25 RX ORDER — OXYCODONE HYDROCHLORIDE 30 MG/1
5 TABLET ORAL
Refills: 0 | Status: DISCONTINUED | OUTPATIENT
Start: 2025-04-25 | End: 2025-05-02

## 2025-04-25 RX ORDER — DULOXETINE 20 MG/1
1 CAPSULE, DELAYED RELEASE ORAL
Qty: 0 | Refills: 0 | DISCHARGE
Start: 2025-04-25

## 2025-04-25 RX ORDER — INSULIN LISPRO 100 U/ML
INJECTION, SOLUTION INTRAVENOUS; SUBCUTANEOUS
Refills: 0 | Status: DISCONTINUED | OUTPATIENT
Start: 2025-04-25 | End: 2025-04-26

## 2025-04-25 RX ORDER — GABAPENTIN 400 MG/1
3 CAPSULE ORAL
Qty: 0 | Refills: 0 | DISCHARGE
Start: 2025-04-25

## 2025-04-25 RX ORDER — GABAPENTIN 400 MG/1
1200 CAPSULE ORAL EVERY 8 HOURS
Refills: 0 | Status: DISCONTINUED | OUTPATIENT
Start: 2025-04-25 | End: 2025-06-13

## 2025-04-25 RX ORDER — AMLODIPINE BESYLATE 10 MG/1
10 TABLET ORAL DAILY
Refills: 0 | Status: DISCONTINUED | OUTPATIENT
Start: 2025-04-26 | End: 2025-05-24

## 2025-04-25 RX ORDER — GLUCAGON 3 MG/1
1 POWDER NASAL ONCE
Refills: 0 | Status: DISCONTINUED | OUTPATIENT
Start: 2025-04-25 | End: 2025-06-13

## 2025-04-25 RX ORDER — LIDOCAINE HYDROCHLORIDE 20 MG/ML
1 JELLY TOPICAL DAILY
Refills: 0 | Status: DISCONTINUED | OUTPATIENT
Start: 2025-04-26 | End: 2025-06-13

## 2025-04-25 RX ORDER — BISACODYL 5 MG
2 TABLET, DELAYED RELEASE (ENTERIC COATED) ORAL
Qty: 0 | Refills: 0 | DISCHARGE
Start: 2025-04-25

## 2025-04-25 RX ORDER — DEXTROSE 50 % IN WATER 50 %
25 SYRINGE (ML) INTRAVENOUS ONCE
Refills: 0 | Status: DISCONTINUED | OUTPATIENT
Start: 2025-04-25 | End: 2025-06-13

## 2025-04-25 RX ORDER — MELATONIN 5 MG
1 TABLET ORAL
Qty: 0 | Refills: 0 | DISCHARGE
Start: 2025-04-25

## 2025-04-25 RX ORDER — PIPERACILLIN-TAZO-DEXTROSE,ISO 2.25G/50ML
3.38 IV SOLUTION, PIGGYBACK PREMIX FROZEN(ML) INTRAVENOUS
Qty: 0 | Refills: 0 | DISCHARGE
Start: 2025-04-25

## 2025-04-25 RX ORDER — QUETIAPINE FUMARATE 25 MG/1
25 TABLET ORAL AT BEDTIME
Refills: 0 | Status: DISCONTINUED | OUTPATIENT
Start: 2025-04-25 | End: 2025-06-13

## 2025-04-25 RX ORDER — DEXTROSE 50 % IN WATER 50 %
15 SYRINGE (ML) INTRAVENOUS ONCE
Refills: 0 | Status: DISCONTINUED | OUTPATIENT
Start: 2025-04-25 | End: 2025-06-13

## 2025-04-25 RX ORDER — CLOTRIMAZOLE 1 G/100G
1 CREAM TOPICAL
Refills: 0 | Status: DISCONTINUED | OUTPATIENT
Start: 2025-04-26 | End: 2025-06-13

## 2025-04-25 RX ORDER — SEVELAMER HYDROCHLORIDE 800 MG/1
1 TABLET ORAL
Qty: 0 | Refills: 0 | DISCHARGE
Start: 2025-04-25

## 2025-04-25 RX ORDER — PIPERACILLIN-TAZO-DEXTROSE,ISO 3.375G/5
3.38 IV SOLUTION, PIGGYBACK PREMIX FROZEN(ML) INTRAVENOUS EVERY 8 HOURS
Refills: 0 | Status: DISCONTINUED | OUTPATIENT
Start: 2025-04-25 | End: 2025-05-12

## 2025-04-25 RX ORDER — POVIDONE-IODINE 7.5 %
1 SOLUTION, NON-ORAL TOPICAL DAILY
Refills: 0 | Status: DISCONTINUED | OUTPATIENT
Start: 2025-04-26 | End: 2025-06-13

## 2025-04-25 RX ORDER — SEVELAMER HYDROCHLORIDE 800 MG/1
800 TABLET ORAL
Refills: 0 | Status: DISCONTINUED | OUTPATIENT
Start: 2025-04-25 | End: 2025-06-13

## 2025-04-25 RX ORDER — DULOXETINE 20 MG/1
60 CAPSULE, DELAYED RELEASE ORAL DAILY
Refills: 0 | Status: DISCONTINUED | OUTPATIENT
Start: 2025-04-26 | End: 2025-05-28

## 2025-04-25 RX ORDER — MUPIROCIN CALCIUM 20 MG/G
1 CREAM TOPICAL
Qty: 0 | Refills: 0 | DISCHARGE
Start: 2025-04-25

## 2025-04-25 RX ORDER — IPRATROPIUM BROMIDE AND ALBUTEROL SULFATE .5; 2.5 MG/3ML; MG/3ML
3 SOLUTION RESPIRATORY (INHALATION) EVERY 6 HOURS
Refills: 0 | Status: DISCONTINUED | OUTPATIENT
Start: 2025-04-25 | End: 2025-06-13

## 2025-04-25 RX ORDER — INSULIN LISPRO 100 U/ML
1 INJECTION, SOLUTION INTRAVENOUS; SUBCUTANEOUS
Qty: 0 | Refills: 0 | DISCHARGE
Start: 2025-04-25

## 2025-04-25 RX ORDER — AMLODIPINE BESYLATE 10 MG/1
1 TABLET ORAL
Qty: 0 | Refills: 0 | DISCHARGE
Start: 2025-04-25

## 2025-04-25 RX ORDER — MELATONIN 5 MG
3 TABLET ORAL AT BEDTIME
Refills: 0 | Status: DISCONTINUED | OUTPATIENT
Start: 2025-04-25 | End: 2025-05-09

## 2025-04-25 RX ORDER — CYCLOBENZAPRINE HYDROCHLORIDE 15 MG/1
1 CAPSULE, EXTENDED RELEASE ORAL
Qty: 0 | Refills: 0 | DISCHARGE
Start: 2025-04-25

## 2025-04-25 RX ORDER — SODIUM CHLORIDE 9 G/1000ML
1000 INJECTION, SOLUTION INTRAVENOUS
Refills: 0 | Status: DISCONTINUED | OUTPATIENT
Start: 2025-04-25 | End: 2025-06-13

## 2025-04-25 RX ORDER — MUPIROCIN CALCIUM 20 MG/G
1 CREAM TOPICAL
Refills: 0 | Status: DISCONTINUED | OUTPATIENT
Start: 2025-04-25 | End: 2025-06-13

## 2025-04-25 RX ORDER — APIXABAN 2.5 MG/1
1 TABLET, FILM COATED ORAL
Qty: 0 | Refills: 0 | DISCHARGE
Start: 2025-04-25

## 2025-04-25 RX ORDER — POLYETHYLENE GLYCOL 3350 17 G/17G
17 POWDER, FOR SOLUTION ORAL
Qty: 0 | Refills: 0 | DISCHARGE
Start: 2025-04-25

## 2025-04-25 RX ORDER — FUROSEMIDE 10 MG/ML
1 INJECTION INTRAMUSCULAR; INTRAVENOUS
Refills: 0 | DISCHARGE

## 2025-04-25 RX ORDER — B1/B2/B3/B5/B6/B12/VIT C/FOLIC 500-0.5 MG
1 TABLET ORAL DAILY
Refills: 0 | Status: DISCONTINUED | OUTPATIENT
Start: 2025-04-26 | End: 2025-06-13

## 2025-04-25 RX ORDER — B1/B2/B3/B5/B6/B12/VIT C/FOLIC 500-0.5 MG
1 TABLET ORAL
Qty: 0 | Refills: 0 | DISCHARGE
Start: 2025-04-25

## 2025-04-25 RX ORDER — OXYCODONE HYDROCHLORIDE 30 MG/1
1 TABLET ORAL
Qty: 0 | Refills: 0 | DISCHARGE
Start: 2025-04-25

## 2025-04-25 RX ORDER — POLYETHYLENE GLYCOL 3350 17 G/17G
17 POWDER, FOR SOLUTION ORAL
Refills: 0 | Status: DISCONTINUED | OUTPATIENT
Start: 2025-04-25 | End: 2025-06-13

## 2025-04-25 RX ORDER — CLOTRIMAZOLE 1 G/100G
1 CREAM TOPICAL
Qty: 0 | Refills: 0 | DISCHARGE
Start: 2025-04-25

## 2025-04-25 RX ORDER — CYCLOBENZAPRINE HYDROCHLORIDE 15 MG/1
5 CAPSULE, EXTENDED RELEASE ORAL THREE TIMES A DAY
Refills: 0 | Status: DISCONTINUED | OUTPATIENT
Start: 2025-04-25 | End: 2025-05-05

## 2025-04-25 RX ORDER — APIXABAN 2.5 MG/1
5 TABLET, FILM COATED ORAL
Refills: 0 | Status: DISCONTINUED | OUTPATIENT
Start: 2025-04-25 | End: 2025-06-13

## 2025-04-25 RX ORDER — ACETAMINOPHEN 500 MG/5ML
2 LIQUID (ML) ORAL
Qty: 0 | Refills: 0 | DISCHARGE
Start: 2025-04-25

## 2025-04-25 RX ORDER — DEXTROSE 50 % IN WATER 50 %
12.5 SYRINGE (ML) INTRAVENOUS ONCE
Refills: 0 | Status: DISCONTINUED | OUTPATIENT
Start: 2025-04-25 | End: 2025-06-13

## 2025-04-25 RX ORDER — IPRATROPIUM BROMIDE AND ALBUTEROL SULFATE .5; 2.5 MG/3ML; MG/3ML
3 SOLUTION RESPIRATORY (INHALATION)
Qty: 0 | Refills: 0 | DISCHARGE
Start: 2025-04-25

## 2025-04-25 RX ORDER — NALOXEGOL OXALATE 12.5 MG/1
25 TABLET, FILM COATED ORAL DAILY
Refills: 0 | Status: DISCONTINUED | OUTPATIENT
Start: 2025-04-26 | End: 2025-06-13

## 2025-04-25 RX ORDER — QUETIAPINE FUMARATE 25 MG/1
1 TABLET ORAL
Qty: 0 | Refills: 0 | DISCHARGE
Start: 2025-04-25

## 2025-04-25 RX ORDER — INSULIN LISPRO 100 U/ML
INJECTION, SOLUTION INTRAVENOUS; SUBCUTANEOUS AT BEDTIME
Refills: 0 | Status: DISCONTINUED | OUTPATIENT
Start: 2025-04-25 | End: 2025-04-26

## 2025-04-25 RX ORDER — SODIUM HYPOCHLORITE 0.12 MG/ML
1 SOLUTION TOPICAL
Refills: 0 | Status: DISCONTINUED | OUTPATIENT
Start: 2025-04-25 | End: 2025-06-13

## 2025-04-25 RX ORDER — ACETAMINOPHEN 500 MG/5ML
650 LIQUID (ML) ORAL EVERY 6 HOURS
Refills: 0 | Status: DISCONTINUED | OUTPATIENT
Start: 2025-04-25 | End: 2025-05-05

## 2025-04-25 RX ORDER — SODIUM HYPOCHLORITE 0.12 MG/ML
1 SOLUTION TOPICAL
Qty: 0 | Refills: 0 | DISCHARGE
Start: 2025-04-25

## 2025-04-25 RX ADMIN — Medication 25 GRAM(S): at 13:59

## 2025-04-25 RX ADMIN — CLOTRIMAZOLE 1 APPLICATION(S): 1 CREAM TOPICAL at 05:25

## 2025-04-25 RX ADMIN — Medication 1 APPLICATION(S): at 23:23

## 2025-04-25 RX ADMIN — APIXABAN 5 MILLIGRAM(S): 2.5 TABLET, FILM COATED ORAL at 20:42

## 2025-04-25 RX ADMIN — OXYCODONE HYDROCHLORIDE 15 MILLIGRAM(S): 30 TABLET ORAL at 05:23

## 2025-04-25 RX ADMIN — SODIUM HYPOCHLORITE 1 APPLICATION(S): 0.12 SOLUTION TOPICAL at 23:17

## 2025-04-25 RX ADMIN — Medication 25 GRAM(S): at 22:36

## 2025-04-25 RX ADMIN — LIDOCAINE HYDROCHLORIDE 1 PATCH: 20 JELLY TOPICAL at 11:56

## 2025-04-25 RX ADMIN — Medication 1 APPLICATION(S): at 11:59

## 2025-04-25 RX ADMIN — Medication 25 GRAM(S): at 05:22

## 2025-04-25 RX ADMIN — SEVELAMER HYDROCHLORIDE 800 MILLIGRAM(S): 800 TABLET ORAL at 08:40

## 2025-04-25 RX ADMIN — APIXABAN 5 MILLIGRAM(S): 2.5 TABLET, FILM COATED ORAL at 05:23

## 2025-04-25 RX ADMIN — OXYCODONE HYDROCHLORIDE 15 MILLIGRAM(S): 30 TABLET ORAL at 11:57

## 2025-04-25 RX ADMIN — SODIUM HYPOCHLORITE 1 APPLICATION(S): 0.12 SOLUTION TOPICAL at 05:24

## 2025-04-25 RX ADMIN — Medication 10 MILLIGRAM(S): at 23:25

## 2025-04-25 RX ADMIN — SEVELAMER HYDROCHLORIDE 800 MILLIGRAM(S): 800 TABLET ORAL at 11:56

## 2025-04-25 RX ADMIN — Medication 1 APPLICATION(S): at 05:25

## 2025-04-25 RX ADMIN — Medication 1 APPLICATION(S): at 02:40

## 2025-04-25 RX ADMIN — POLYETHYLENE GLYCOL 3350 17 GRAM(S): 17 POWDER, FOR SOLUTION ORAL at 05:23

## 2025-04-25 RX ADMIN — Medication 3 MILLIGRAM(S): at 23:00

## 2025-04-25 RX ADMIN — QUETIAPINE FUMARATE 25 MILLIGRAM(S): 25 TABLET ORAL at 23:09

## 2025-04-25 RX ADMIN — MUPIROCIN CALCIUM 1 APPLICATION(S): 20 CREAM TOPICAL at 05:25

## 2025-04-25 RX ADMIN — GABAPENTIN 1200 MILLIGRAM(S): 400 CAPSULE ORAL at 13:59

## 2025-04-25 RX ADMIN — AMLODIPINE BESYLATE 10 MILLIGRAM(S): 10 TABLET ORAL at 05:23

## 2025-04-25 RX ADMIN — Medication 1 TABLET(S): at 11:57

## 2025-04-25 RX ADMIN — GABAPENTIN 1200 MILLIGRAM(S): 400 CAPSULE ORAL at 05:23

## 2025-04-25 RX ADMIN — Medication 1 APPLICATION(S): at 05:24

## 2025-04-25 RX ADMIN — DULOXETINE 60 MILLIGRAM(S): 20 CAPSULE, DELAYED RELEASE ORAL at 11:57

## 2025-04-25 RX ADMIN — OXYCODONE HYDROCHLORIDE 5 MILLIGRAM(S): 30 TABLET ORAL at 14:00

## 2025-04-25 RX ADMIN — GABAPENTIN 1200 MILLIGRAM(S): 400 CAPSULE ORAL at 23:09

## 2025-04-25 RX ADMIN — Medication 40 MILLIGRAM(S): at 05:26

## 2025-04-25 RX ADMIN — SEVELAMER HYDROCHLORIDE 800 MILLIGRAM(S): 800 TABLET ORAL at 20:42

## 2025-04-25 RX ADMIN — OXYCODONE HYDROCHLORIDE 15 MILLIGRAM(S): 30 TABLET ORAL at 20:36

## 2025-04-25 RX ADMIN — NALOXEGOL OXALATE 25 MILLIGRAM(S): 12.5 TABLET, FILM COATED ORAL at 11:56

## 2025-04-25 NOTE — PROGRESS NOTE ADULT - SUBJECTIVE AND OBJECTIVE BOX
Patient is a 39y old  Male who presents with a chief complaint of sob (02 Mar 2025 06:19)       INTERVAL HPI/OVERNIGHT EVENTS:  Patient seen and evaluated at bedside.  Pt is resting comfortable in NAD. Denies N/V/F/C.  Pain rated at X/10    Allergies    No Known Allergies    Intolerances        Vital Signs Last 24 Hrs  T(C): 36.6 (25 Apr 2025 08:00), Max: 36.7 (24 Apr 2025 16:32)  T(F): 97.9 (25 Apr 2025 08:00), Max: 98.1 (24 Apr 2025 16:32)  HR: 104 (25 Apr 2025 11:28) (98 - 113)  BP: 123/73 (25 Apr 2025 08:00) (123/73 - 149/94)  BP(mean): --  RR: 19 (25 Apr 2025 08:00) (16 - 22)  SpO2: 94% (25 Apr 2025 11:28) (93% - 100%)    Parameters below as of 25 Apr 2025 08:00  Patient On (Oxygen Delivery Method): room air        LABS:                        8.7    7.72  )-----------( 344      ( 24 Apr 2025 05:28 )             29.1     04-24    139  |  97[L]  |  10  ----------------------------<  100[H]  3.7   |  26  |  1.26    Ca    10.0      24 Apr 2025 05:28  Phos  5.0     04-24  Mg     1.80     04-24        Urinalysis Basic - ( 24 Apr 2025 05:28 )    Color: x / Appearance: x / SG: x / pH: x  Gluc: 100 mg/dL / Ketone: x  / Bili: x / Urobili: x   Blood: x / Protein: x / Nitrite: x   Leuk Esterase: x / RBC: x / WBC x   Sq Epi: x / Non Sq Epi: x / Bacteria: x      CAPILLARY BLOOD GLUCOSE      POCT Blood Glucose.: 108 mg/dL (25 Apr 2025 11:17)  POCT Blood Glucose.: 100 mg/dL (25 Apr 2025 08:05)  POCT Blood Glucose.: 106 mg/dL (24 Apr 2025 21:11)  POCT Blood Glucose.: 109 mg/dL (24 Apr 2025 17:16)      Lower Extremity Physical Exam:  Vascular: DP 1/4 B/L, PT 0/4, B/L secondary to +3 pitting edema, CFT <3 seconds B/L, Temperature gradient warm to cool, B/L.   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 Right foot plantar lateral forefoot wound to subQ, no malodor, no pus, no acute signs of infection. Left foot digit 1,2,4 partial thickness dry gangrene. Left foot lateral plantar forefoot wound to subQ, no malodor, no pus, no acute signs of infection. improving in appearance L>R    RADIOLOGY & ADDITIONAL TESTS:

## 2025-04-25 NOTE — PROGRESS NOTE ADULT - SUBJECTIVE AND OBJECTIVE BOX
CHIEF COMPLAINT:Patient is a 39y old  Male who presents with a chief complaint of sob (02 Mar 2025 06:19)      INTERVAL EVENTS:     ROS: Seen by bedside during AM rounds     OBJECTIVE:  ICU Vital Signs Last 24 Hrs  T(C): 36.6 (25 Apr 2025 04:00), Max: 36.9 (24 Apr 2025 08:00)  T(F): 97.8 (25 Apr 2025 04:00), Max: 98.4 (24 Apr 2025 08:00)  HR: 98 (25 Apr 2025 04:00) (98 - 113)  BP: 147/97 (25 Apr 2025 04:00) (126/78 - 149/94)  BP(mean): --  ABP: --  ABP(mean): --  RR: 16 (25 Apr 2025 04:00) (16 - 22)  SpO2: 100% (25 Apr 2025 04:00) (94% - 100%)    O2 Parameters below as of 25 Apr 2025 04:00  Patient On (Oxygen Delivery Method): BiPAP/CPAP              CAPILLARY BLOOD GLUCOSE      POCT Blood Glucose.: 106 mg/dL (24 Apr 2025 21:11)      PHYSICAL EXAM:  General:   HEENT:   Lymph Nodes:  Neck:   Respiratory:   Cardiovascular:   Abdomen:   Extremities:   Skin:   Neurological:  Psychiatry:        HOSPITAL MEDICATIONS:  MEDICATIONS  (STANDING):  amLODIPine   Tablet 10 milliGRAM(s) Oral daily  apixaban 5 milliGRAM(s) Oral every 12 hours  bisacodyl 10 milliGRAM(s) Oral at bedtime  capsaicin HP 0.075% Cream 1 Application(s) Topical four times a day  chlorhexidine 2% Cloths 1 Application(s) Topical <User Schedule>  clotrimazole 1% Cream 1 Application(s) Topical two times a day  Dakins Solution - 1/4 Strength 1 Application(s) Topical two times a day  dextrose 50% Injectable 25 Gram(s) IV Push once  dextrose 50% Injectable 12.5 Gram(s) IV Push once  dextrose 50% Injectable 25 Gram(s) IV Push once  DULoxetine 60 milliGRAM(s) Oral daily  gabapentin 1200 milliGRAM(s) Oral every 8 hours  glucagon  Injectable 1 milliGRAM(s) IntraMuscular once  insulin lispro (ADMELOG) corrective regimen sliding scale   SubCutaneous three times a day before meals  insulin lispro (ADMELOG) corrective regimen sliding scale   SubCutaneous at bedtime  lidocaine   4% Patch 1 Patch Transdermal daily  lidocaine   4% Patch 1 Patch Transdermal daily  melatonin 3 milliGRAM(s) Oral at bedtime  multivitamin 1 Tablet(s) Oral daily  mupirocin 2% Ointment 1 Application(s) Topical two times a day  naloxegol 25 milliGRAM(s) Oral daily  pantoprazole    Tablet 40 milliGRAM(s) Oral before breakfast  piperacillin/tazobactam IVPB.. 3.375 Gram(s) IV Intermittent every 8 hours  polyethylene glycol 3350 17 Gram(s) Oral two times a day  QUEtiapine 25 milliGRAM(s) Oral at bedtime  sevelamer carbonate 800 milliGRAM(s) Oral three times a day with meals    MEDICATIONS  (PRN):  acetaminophen     Tablet .. 650 milliGRAM(s) Oral every 6 hours PRN Temp greater or equal to 38C (100.4F), Mild Pain (1 - 3), Moderate Pain (4 - 6)  albuterol/ipratropium for Nebulization 3 milliLiter(s) Nebulizer every 6 hours PRN Shortness of Breath and/or Wheezing  cyclobenzaprine 5 milliGRAM(s) Oral three times a day PRN Muscle Spasm  dextrose Oral Gel 15 Gram(s) Oral once PRN Blood Glucose LESS THAN 70 milliGRAM(s)/deciliter  oxyCODONE    IR 5 milliGRAM(s) Oral two times a day PRN Severe Pain (7 - 10) related to dressing change  oxyCODONE    IR 15 milliGRAM(s) Oral every 6 hours PRN Severe Pain (7 - 10)      LABS:                        8.7    7.72  )-----------( 344      ( 24 Apr 2025 05:28 )             29.1     04-24    139  |  97[L]  |  10  ----------------------------<  100[H]  3.7   |  26  |  1.26    Ca    10.0      24 Apr 2025 05:28  Phos  5.0     04-24  Mg     1.80     04-24        Urinalysis Basic - ( 24 Apr 2025 05:28 )    Color: x / Appearance: x / SG: x / pH: x  Gluc: 100 mg/dL / Ketone: x  / Bili: x / Urobili: x   Blood: x / Protein: x / Nitrite: x   Leuk Esterase: x / RBC: x / WBC x   Sq Epi: x / Non Sq Epi: x / Bacteria: x         CHIEF COMPLAINT:Patient is a 39y old  Male who presents with a chief complaint of sob (02 Mar 2025 06:19)      INTERVAL EVENTS:     ROS: Seen by bedside during AM rounds     OBJECTIVE:  ICU Vital Signs Last 24 Hrs  T(C): 36.6 (25 Apr 2025 04:00), Max: 36.9 (24 Apr 2025 08:00)  T(F): 97.8 (25 Apr 2025 04:00), Max: 98.4 (24 Apr 2025 08:00)  HR: 98 (25 Apr 2025 04:00) (98 - 113)  BP: 147/97 (25 Apr 2025 04:00) (126/78 - 149/94)  BP(mean): --  ABP: --  ABP(mean): --  RR: 16 (25 Apr 2025 04:00) (16 - 22)  SpO2: 100% (25 Apr 2025 04:00) (94% - 100%)    O2 Parameters below as of 25 Apr 2025 04:00  Patient On (Oxygen Delivery Method): BiPAP/CPAP              CAPILLARY BLOOD GLUCOSE      POCT Blood Glucose.: 106 mg/dL (24 Apr 2025 21:11)      PHYSICAL EXAM:  General: NAD   Neck: trachea midline.  Cards: S1/S2, no murmurs   Pulm: CTA bilaterally. No wheezes.   Abdomen: Morbidly obese abdomen. Nontender. No R/G. Preexisting PEG tube site stoma with overlying dressing.   Extremities: Mild b/l LE edema. Extremities warm to touch.  Neurology: AOx3. 4/ 5 motor strength b/l UE. 3-4/5 motor strength b/l LE. Following basic commands.   Skin: warm to touch, color appropriate for ethnicity. Refer to RN assessment for further details.          HOSPITAL MEDICATIONS:  MEDICATIONS  (STANDING):  amLODIPine   Tablet 10 milliGRAM(s) Oral daily  apixaban 5 milliGRAM(s) Oral every 12 hours  bisacodyl 10 milliGRAM(s) Oral at bedtime  capsaicin HP 0.075% Cream 1 Application(s) Topical four times a day  chlorhexidine 2% Cloths 1 Application(s) Topical <User Schedule>  clotrimazole 1% Cream 1 Application(s) Topical two times a day  Dakins Solution - 1/4 Strength 1 Application(s) Topical two times a day  dextrose 50% Injectable 25 Gram(s) IV Push once  dextrose 50% Injectable 12.5 Gram(s) IV Push once  dextrose 50% Injectable 25 Gram(s) IV Push once  DULoxetine 60 milliGRAM(s) Oral daily  gabapentin 1200 milliGRAM(s) Oral every 8 hours  glucagon  Injectable 1 milliGRAM(s) IntraMuscular once  insulin lispro (ADMELOG) corrective regimen sliding scale   SubCutaneous three times a day before meals  insulin lispro (ADMELOG) corrective regimen sliding scale   SubCutaneous at bedtime  lidocaine   4% Patch 1 Patch Transdermal daily  lidocaine   4% Patch 1 Patch Transdermal daily  melatonin 3 milliGRAM(s) Oral at bedtime  multivitamin 1 Tablet(s) Oral daily  mupirocin 2% Ointment 1 Application(s) Topical two times a day  naloxegol 25 milliGRAM(s) Oral daily  pantoprazole    Tablet 40 milliGRAM(s) Oral before breakfast  piperacillin/tazobactam IVPB.. 3.375 Gram(s) IV Intermittent every 8 hours  polyethylene glycol 3350 17 Gram(s) Oral two times a day  QUEtiapine 25 milliGRAM(s) Oral at bedtime  sevelamer carbonate 800 milliGRAM(s) Oral three times a day with meals    MEDICATIONS  (PRN):  acetaminophen     Tablet .. 650 milliGRAM(s) Oral every 6 hours PRN Temp greater or equal to 38C (100.4F), Mild Pain (1 - 3), Moderate Pain (4 - 6)  albuterol/ipratropium for Nebulization 3 milliLiter(s) Nebulizer every 6 hours PRN Shortness of Breath and/or Wheezing  cyclobenzaprine 5 milliGRAM(s) Oral three times a day PRN Muscle Spasm  dextrose Oral Gel 15 Gram(s) Oral once PRN Blood Glucose LESS THAN 70 milliGRAM(s)/deciliter  oxyCODONE    IR 5 milliGRAM(s) Oral two times a day PRN Severe Pain (7 - 10) related to dressing change  oxyCODONE    IR 15 milliGRAM(s) Oral every 6 hours PRN Severe Pain (7 - 10)      LABS:                        8.7    7.72  )-----------( 344      ( 24 Apr 2025 05:28 )             29.1     04-24    139  |  97[L]  |  10  ----------------------------<  100[H]  3.7   |  26  |  1.26    Ca    10.0      24 Apr 2025 05:28  Phos  5.0     04-24  Mg     1.80     04-24        Urinalysis Basic - ( 24 Apr 2025 05:28 )    Color: x / Appearance: x / SG: x / pH: x  Gluc: 100 mg/dL / Ketone: x  / Bili: x / Urobili: x   Blood: x / Protein: x / Nitrite: x   Leuk Esterase: x / RBC: x / WBC x   Sq Epi: x / Non Sq Epi: x / Bacteria: x         CHIEF COMPLAINT:Patient is a 39y old  Male who presents with a chief complaint of sob (02 Mar 2025 06:19)      INTERVAL EVENTS: no overnight events. Clinically unchanged. Plan for DC today.    ROS: Seen by bedside during AM rounds     OBJECTIVE:  ICU Vital Signs Last 24 Hrs  T(C): 36.6 (25 Apr 2025 04:00), Max: 36.9 (24 Apr 2025 08:00)  T(F): 97.8 (25 Apr 2025 04:00), Max: 98.4 (24 Apr 2025 08:00)  HR: 98 (25 Apr 2025 04:00) (98 - 113)  BP: 147/97 (25 Apr 2025 04:00) (126/78 - 149/94)  BP(mean): --  ABP: --  ABP(mean): --  RR: 16 (25 Apr 2025 04:00) (16 - 22)  SpO2: 100% (25 Apr 2025 04:00) (94% - 100%)    O2 Parameters below as of 25 Apr 2025 04:00  Patient On (Oxygen Delivery Method): BiPAP/CPAP              CAPILLARY BLOOD GLUCOSE      POCT Blood Glucose.: 106 mg/dL (24 Apr 2025 21:11)      PHYSICAL EXAM:  General: NAD   Neck: trachea midline.  Cards: S1/S2, no murmurs   Pulm: CTA bilaterally. No wheezes.   Abdomen: Morbidly obese abdomen. Nontender. No R/G. Preexisting PEG tube site stoma with overlying dressing.   Extremities: Mild b/l LE edema. Extremities warm to touch.  Neurology: AOx3. 5/5 motor strength b/l UE. 3-4/5 motor strength b/l LE. Following basic commands.   Skin: warm to touch, color appropriate for ethnicity. Refer to RN assessment for further details.          HOSPITAL MEDICATIONS:  MEDICATIONS  (STANDING):  amLODIPine   Tablet 10 milliGRAM(s) Oral daily  apixaban 5 milliGRAM(s) Oral every 12 hours  bisacodyl 10 milliGRAM(s) Oral at bedtime  capsaicin HP 0.075% Cream 1 Application(s) Topical four times a day  chlorhexidine 2% Cloths 1 Application(s) Topical <User Schedule>  clotrimazole 1% Cream 1 Application(s) Topical two times a day  Dakins Solution - 1/4 Strength 1 Application(s) Topical two times a day  dextrose 50% Injectable 25 Gram(s) IV Push once  dextrose 50% Injectable 12.5 Gram(s) IV Push once  dextrose 50% Injectable 25 Gram(s) IV Push once  DULoxetine 60 milliGRAM(s) Oral daily  gabapentin 1200 milliGRAM(s) Oral every 8 hours  glucagon  Injectable 1 milliGRAM(s) IntraMuscular once  insulin lispro (ADMELOG) corrective regimen sliding scale   SubCutaneous three times a day before meals  insulin lispro (ADMELOG) corrective regimen sliding scale   SubCutaneous at bedtime  lidocaine   4% Patch 1 Patch Transdermal daily  lidocaine   4% Patch 1 Patch Transdermal daily  melatonin 3 milliGRAM(s) Oral at bedtime  multivitamin 1 Tablet(s) Oral daily  mupirocin 2% Ointment 1 Application(s) Topical two times a day  naloxegol 25 milliGRAM(s) Oral daily  pantoprazole    Tablet 40 milliGRAM(s) Oral before breakfast  piperacillin/tazobactam IVPB.. 3.375 Gram(s) IV Intermittent every 8 hours  polyethylene glycol 3350 17 Gram(s) Oral two times a day  QUEtiapine 25 milliGRAM(s) Oral at bedtime  sevelamer carbonate 800 milliGRAM(s) Oral three times a day with meals    MEDICATIONS  (PRN):  acetaminophen     Tablet .. 650 milliGRAM(s) Oral every 6 hours PRN Temp greater or equal to 38C (100.4F), Mild Pain (1 - 3), Moderate Pain (4 - 6)  albuterol/ipratropium for Nebulization 3 milliLiter(s) Nebulizer every 6 hours PRN Shortness of Breath and/or Wheezing  cyclobenzaprine 5 milliGRAM(s) Oral three times a day PRN Muscle Spasm  dextrose Oral Gel 15 Gram(s) Oral once PRN Blood Glucose LESS THAN 70 milliGRAM(s)/deciliter  oxyCODONE    IR 5 milliGRAM(s) Oral two times a day PRN Severe Pain (7 - 10) related to dressing change  oxyCODONE    IR 15 milliGRAM(s) Oral every 6 hours PRN Severe Pain (7 - 10)      LABS:                        8.7    7.72  )-----------( 344      ( 24 Apr 2025 05:28 )             29.1     04-24    139  |  97[L]  |  10  ----------------------------<  100[H]  3.7   |  26  |  1.26    Ca    10.0      24 Apr 2025 05:28  Phos  5.0     04-24  Mg     1.80     04-24        Urinalysis Basic - ( 24 Apr 2025 05:28 )    Color: x / Appearance: x / SG: x / pH: x  Gluc: 100 mg/dL / Ketone: x  / Bili: x / Urobili: x   Blood: x / Protein: x / Nitrite: x   Leuk Esterase: x / RBC: x / WBC x   Sq Epi: x / Non Sq Epi: x / Bacteria: x

## 2025-04-25 NOTE — PROGRESS NOTE ADULT - PROVIDER SPECIALTY LIST ADULT
ECMO
ECMO
Infectious Disease
MICU
Podiatry
Podiatry
Pulmonology
Rehab Medicine
Thoracic Surgery
Thoracic Surgery
Wound Care
Infectious Disease
Pain Medicine
Pain Medicine
Palliative Care
Podiatry
Pulmonology
Rehab Medicine
Surgery
Thoracic Surgery
Thoracic Surgery
Vascular Surgery
Wound Care
Critical Care
ECMO
Infectious Disease
MICU
Podiatry
Podiatry
Pulmonology
Surgery
Vascular Surgery
Wound Care
ECMO
Infectious Disease
Pulmonology
Infectious Disease
Pulmonology
Palliative Care
Palliative Care

## 2025-04-25 NOTE — PROGRESS NOTE ADULT - NUTRITIONAL ASSESSMENT
This patient has been assessed with a concern for Malnutrition and has been determined to have a diagnosis/diagnoses of Morbid obesity (BMI > 40).    This patient is being managed with:   Diet NPO with Tube Feed-  Tube Feeding Modality: Gastrostomy  Crisatl [facility] Peptide 1.5 (KFPEPT1.5RTH)  Total Volume for 24 Hours (mL): 1080  Continuous  Starting Tube Feed Rate {mL per Hour}: 35  Increase Tube Feed Rate by (mL): 10     Every 4 hours  Until Goal Tube Feed Rate (mL per Hour): 60  Tube Feed Duration (in Hours): 18  Tube Feed Start Time: 11:00  Tube Feed Stop Time: 05:00  Liquid Protein Supplement     Qty per Day:  4  Entered: Mar 10 2025 10:26AM  
This patient has been assessed with a concern for Malnutrition and has been determined to have a diagnosis/diagnoses of Morbid obesity (BMI > 40).    This patient is being managed with:   Diet NPO with Tube Feed-  Tube Feeding Modality: Gastrostomy  Cristal [facility] Peptide 1.5 (KFPEPT1.5RTH)  Total Volume for 24 Hours (mL): 1080  Continuous  Starting Tube Feed Rate {mL per Hour}: 35  Increase Tube Feed Rate by (mL): 10     Every 4 hours  Until Goal Tube Feed Rate (mL per Hour): 60  Tube Feed Duration (in Hours): 18  Tube Feed Start Time: 11:00  Tube Feed Stop Time: 05:00  Liquid Protein Supplement     Qty per Day:  4  Entered: Mar 10 2025 10:26AM  
This patient has been assessed with a concern for Malnutrition and has been determined to have a diagnosis/diagnoses of Morbid obesity (BMI > 40).    This patient is being managed with:   Diet NPO with Tube Feed-  Tube Feeding Modality: Orogastric  Peptamen Intense VHP  Total Volume for 24 Hours (mL): 840  Continuous  Starting Tube Feed Rate {mL per Hour}: 10  Increase Tube Feed Rate by (mL): 5     Every 4 hours  Until Goal Tube Feed Rate (mL per Hour): 35  Tube Feed Duration (in Hours): 24  Tube Feed Start Time: 00:00  Liquid Protein Supplement     Qty per Day:  3  Entered: Feb 26 2025  1:43PM    The following pending diet order is being considered for treatment of Morbid obesity (BMI > 40):  Diet NPO with Tube Feed-  Tube Feeding Modality: Orogastric  Peptamen Very High Protein (VHP)  Total Volume for 24 Hours (mL): 960  Continuous  Starting Tube Feed Rate {mL per Hour}: 30  Increase Tube Feed Rate by (mL): 10     Every hour  Until Goal Tube Feed Rate (mL per Hour): 40  Tube Feed Duration (in Hours): 24  Tube Feed Start Time: 11:00  Liquid Protein Supplement     Qty per Day:  4  Entered: Feb 28 2025 10:46AM  
This patient has been assessed with a concern for Malnutrition and has been determined to have a diagnosis/diagnoses of Morbid obesity (BMI > 40).    This patient is being managed with:   Diet Regular-  Consistent Carbohydrate {Evening Snack} (CSTCHOSN)  No Concentrated Phosphorus  Supplement Feeding Modality:  Oral  Ensure Max Cans or Servings Per Day:  1       Frequency:  Two Times a day  Entered: Apr 18 2025 10:29AM  
This patient has been assessed with a concern for Malnutrition and has been determined to have a diagnosis/diagnoses of Morbid obesity (BMI > 40).    This patient is being managed with:   Diet Soft and Bite Sized-  Consistent Carbohydrate {Evening Snack} (CSTCHOSN)  Supplement Feeding Modality:  Oral  Ensure Max Cans or Servings Per Day:  1       Frequency:  Two Times a day  Entered: Apr 16 2025  9:46AM  
This patient has been assessed with a concern for Malnutrition and has been determined to have a diagnosis/diagnoses of Morbid obesity (BMI > 40).    This patient is being managed with:   Diet Soft and Bite Sized-  Consistent Carbohydrate {Evening Snack} (CSTCHOSN)  Supplement Feeding Modality:  Oral  Ensure Max Cans or Servings Per Day:  1       Frequency:  Two Times a day  Entered: Apr 4 2025  3:13PM  
This patient has been assessed with a concern for Malnutrition and has been determined to have a diagnosis/diagnoses of Morbid obesity (BMI > 40).    This patient is being managed with:   Diet Soft and Bite Sized-  Supplement Feeding Modality:  Oral  Ensure Max Cans or Servings Per Day:  1       Frequency:  Two Times a day  Entered: Mar 24 2025  4:04PM  
This patient has been assessed with a concern for Malnutrition and has been determined to have a diagnosis/diagnoses of Morbid obesity (BMI > 40).    This patient is being managed with:   Diet NPO after Midnight-     NPO Start Date: 06-Mar-2025   NPO Start Time: 23:59  Entered: Mar  6 2025  9:14AM    Diet NPO with Tube Feed-  Tube Feeding Modality: Orogastric  Petpamen Very High Protein (VHP)  Continuous  Until Goal Tube Feed Rate (mL per Hour): 35  Tube Feed Duration (in Hours): 24  Tube Feed Start Time: 15:00  Liquid Protein Supplement     Qty per Day:  4  Entered: Mar  4 2025  2:50PM  
This patient has been assessed with a concern for Malnutrition and has been determined to have a diagnosis/diagnoses of Morbid obesity (BMI > 40).    This patient is being managed with:   Diet NPO with Tube Feed-  Tube Feeding Modality: Gastrostomy  Cristal [facility] Peptide 1.5 (KFPEPT1.5RTH)  Total Volume for 24 Hours (mL): 1080  Continuous  Starting Tube Feed Rate {mL per Hour}: 35  Increase Tube Feed Rate by (mL): 10     Every 4 hours  Until Goal Tube Feed Rate (mL per Hour): 60  Tube Feed Duration (in Hours): 18  Tube Feed Start Time: 11:00  Tube Feed Stop Time: 05:00  Liquid Protein Supplement     Qty per Day:  4  Entered: Mar 10 2025 10:26AM  
This patient has been assessed with a concern for Malnutrition and has been determined to have a diagnosis/diagnoses of Morbid obesity (BMI > 40).    This patient is being managed with:   Diet NPO with Tube Feed-  Tube Feeding Modality: Gastrostomy  Cristal [facility] Peptide 1.5 (KFPEPT1.5RTH)  Total Volume for 24 Hours (mL): 1080  Continuous  Starting Tube Feed Rate {mL per Hour}: 35  Increase Tube Feed Rate by (mL): 10     Every 4 hours  Until Goal Tube Feed Rate (mL per Hour): 60  Tube Feed Duration (in Hours): 18  Tube Feed Start Time: 11:00  Tube Feed Stop Time: 05:00  Liquid Protein Supplement     Qty per Day:  4  Entered: Mar 10 2025 10:26AM  
This patient has been assessed with a concern for Malnutrition and has been determined to have a diagnosis/diagnoses of Morbid obesity (BMI > 40).    This patient is being managed with:   Diet NPO with Tube Feed-  Tube Feeding Modality: Orogastric  Peptamen Intense VHP  Total Volume for 24 Hours (mL): 840  Continuous  Starting Tube Feed Rate {mL per Hour}: 10  Increase Tube Feed Rate by (mL): 5     Every 4 hours  Until Goal Tube Feed Rate (mL per Hour): 35  Tube Feed Duration (in Hours): 24  Tube Feed Start Time: 00:00  Liquid Protein Supplement     Qty per Day:  3  Entered: Feb 26 2025  1:43PM    The following pending diet order is being considered for treatment of Morbid obesity (BMI > 40):  Diet NPO with Tube Feed-  Tube Feeding Modality: Orogastric  Peptamen Very High Protein (VHP)  Total Volume for 24 Hours (mL): 960  Continuous  Starting Tube Feed Rate {mL per Hour}: 30  Increase Tube Feed Rate by (mL): 10     Every hour  Until Goal Tube Feed Rate (mL per Hour): 40  Tube Feed Duration (in Hours): 24  Tube Feed Start Time: 11:00  Liquid Protein Supplement     Qty per Day:  4  Entered: Feb 28 2025 10:46AM  
This patient has been assessed with a concern for Malnutrition and has been determined to have a diagnosis/diagnoses of Morbid obesity (BMI > 40).    This patient is being managed with:   Diet NPO with Tube Feed-  Tube Feeding Modality: Orogastric  Petpamen Very High Protein (VHP)  Continuous  Until Goal Tube Feed Rate (mL per Hour): 35  Tube Feed Duration (in Hours): 24  Tube Feed Start Time: 15:00  Liquid Protein Supplement     Qty per Day:  4  Entered: Mar  4 2025  2:50PM  
This patient has been assessed with a concern for Malnutrition and has been determined to have a diagnosis/diagnoses of Morbid obesity (BMI > 40).    This patient is being managed with:   Diet NPO with Tube Feed-  Tube Feeding Modality: Orogastric  Petpamen Very High Protein (VHP)  Continuous  Until Goal Tube Feed Rate (mL per Hour): 35  Tube Feed Duration (in Hours): 24  Tube Feed Start Time: 15:00  Liquid Protein Supplement     Qty per Day:  4  Entered: Mar  4 2025  2:50PM  
This patient has been assessed with a concern for Malnutrition and has been determined to have a diagnosis/diagnoses of Morbid obesity (BMI > 40).    This patient is being managed with:   Diet NPO with Tube Feed-  Tube Feeding Modality: Orogastric  Petpamen Very High Protein (VHP)  Continuous  Until Goal Tube Feed Rate (mL per Hour): 35  Tube Feed Duration (in Hours): 24  Tube Feed Start Time: 15:00  Liquid Protein Supplement     Qty per Day:  4  Entered: Mar  8 2025  9:42AM  
This patient has been assessed with a concern for Malnutrition and has been determined to have a diagnosis/diagnoses of Morbid obesity (BMI > 40).    This patient is being managed with:   Diet NPO with Tube Feed-  Tube Feeding Modality: Orogastric  Petpamen Very High Protein (VHP)  Continuous  Until Goal Tube Feed Rate (mL per Hour): 35  Tube Feed Duration (in Hours): 24  Tube Feed Start Time: 15:00  Liquid Protein Supplement     Qty per Day:  4  Entered: Mar  8 2025  9:42AM  
This patient has been assessed with a concern for Malnutrition and has been determined to have a diagnosis/diagnoses of Morbid obesity (BMI > 40).    This patient is being managed with:   Diet NPO-  Except Medications  Entered: Apr 14 2025 11:15AM  
This patient has been assessed with a concern for Malnutrition and has been determined to have a diagnosis/diagnoses of Morbid obesity (BMI > 40).    This patient is being managed with:   Diet Regular-  Consistent Carbohydrate {Evening Snack} (CSTCHOSN)  No Concentrated Phosphorus  Efrain(7 Gm Arginine/7 Gm Glut/1.2 Gm HMB     Qty per Day:  1  Supplement Feeding Modality:  Oral  Ensure Max Cans or Servings Per Day:  1       Frequency:  Two Times a day  Entered: Apr 21 2025  6:05PM  
This patient has been assessed with a concern for Malnutrition and has been determined to have a diagnosis/diagnoses of Morbid obesity (BMI > 40).    This patient is being managed with:   Diet Soft and Bite Sized-  Consistent Carbohydrate {Evening Snack} (CSTCHOSN)  Supplement Feeding Modality:  Oral  Ensure Max Cans or Servings Per Day:  1       Frequency:  Two Times a day  Entered: Apr 4 2025  3:13PM  
This patient has been assessed with a concern for Malnutrition and has been determined to have a diagnosis/diagnoses of Morbid obesity (BMI > 40).    This patient is being managed with:   Diet Soft and Bite Sized-  Entered: Mar 20 2025 12:51PM  
This patient has been assessed with a concern for Malnutrition and has been determined to have a diagnosis/diagnoses of Morbid obesity (BMI > 40).    This patient is being managed with:   Diet Soft and Bite Sized-  Supplement Feeding Modality:  Oral  Ensure Max Cans or Servings Per Day:  1       Frequency:  Two Times a day  Entered: Mar 24 2025  4:04PM  
This patient has been assessed with a concern for Malnutrition and has been determined to have a diagnosis/diagnoses of Morbid obesity (BMI > 40).    This patient is being managed with:   Diet Soft and Bite Sized-  Supplement Feeding Modality:  Oral  Ensure Max Cans or Servings Per Day:  1       Frequency:  Two Times a day  Entered: Mar 24 2025  4:04PM  
This patient has been assessed with a concern for Malnutrition and has been determined to have a diagnosis/diagnoses of Morbid obesity (BMI > 40).    This patient is being managed with:   Diet NPO with Tube Feed-  Tube Feeding Modality: Gastrostomy  Cristal [facility] Peptide 1.5 (KFPEPT1.5RTH)  Total Volume for 24 Hours (mL): 1080  Continuous  Starting Tube Feed Rate {mL per Hour}: 35  Increase Tube Feed Rate by (mL): 10     Every 4 hours  Until Goal Tube Feed Rate (mL per Hour): 60  Tube Feed Duration (in Hours): 18  Tube Feed Start Time: 11:00  Tube Feed Stop Time: 05:00  Liquid Protein Supplement     Qty per Day:  4  Entered: Mar 10 2025 10:26AM  
This patient has been assessed with a concern for Malnutrition and has been determined to have a diagnosis/diagnoses of Morbid obesity (BMI > 40).    This patient is being managed with:   Diet NPO with Tube Feed-  Tube Feeding Modality: Gastrostomy  Cristal [facility] Peptide 1.5 (KFPEPT1.5RTH)  Total Volume for 24 Hours (mL): 1080  Continuous  Starting Tube Feed Rate {mL per Hour}: 35  Increase Tube Feed Rate by (mL): 10     Every 4 hours  Until Goal Tube Feed Rate (mL per Hour): 60  Tube Feed Duration (in Hours): 18  Tube Feed Start Time: 11:00  Tube Feed Stop Time: 05:00  Liquid Protein Supplement     Qty per Day:  4  Entered: Mar 10 2025 10:26AM  
This patient has been assessed with a concern for Malnutrition and has been determined to have a diagnosis/diagnoses of Morbid obesity (BMI > 40).    This patient is being managed with:   Diet NPO with Tube Feed-  Tube Feeding Modality: Gastrostomy  Cristal [facility] Peptide 1.5 (KFPEPT1.5RTH)  Total Volume for 24 Hours (mL): 1080  Continuous  Starting Tube Feed Rate {mL per Hour}: 35  Increase Tube Feed Rate by (mL): 10     Every 4 hours  Until Goal Tube Feed Rate (mL per Hour): 60  Tube Feed Duration (in Hours): 18  Tube Feed Start Time: 11:00  Tube Feed Stop Time: 05:00  Liquid Protein Supplement     Qty per Day:  4  Entered: Mar 10 2025 10:26AM  
This patient has been assessed with a concern for Malnutrition and has been determined to have a diagnosis/diagnoses of Morbid obesity (BMI > 40).    This patient is being managed with:   Diet NPO-  Entered: Mar  7 2025 10:38AM  
This patient has been assessed with a concern for Malnutrition and has been determined to have a diagnosis/diagnoses of Morbid obesity (BMI > 40).    This patient is being managed with:   Diet Regular-  Consistent Carbohydrate {Evening Snack} (CSTCHOSN)  No Concentrated Phosphorus  Efrain(7 Gm Arginine/7 Gm Glut/1.2 Gm HMB     Qty per Day:  1  Supplement Feeding Modality:  Oral  Ensure Max Cans or Servings Per Day:  1       Frequency:  Two Times a day  Entered: Apr 21 2025  6:05PM  
This patient has been assessed with a concern for Malnutrition and has been determined to have a diagnosis/diagnoses of Morbid obesity (BMI > 40).    This patient is being managed with:   Diet Regular-  Consistent Carbohydrate {Evening Snack} (CSTCHOSN)  No Concentrated Phosphorus  Supplement Feeding Modality:  Oral  Ensure Max Cans or Servings Per Day:  1       Frequency:  Two Times a day  Entered: Apr 18 2025 10:29AM  
This patient has been assessed with a concern for Malnutrition and has been determined to have a diagnosis/diagnoses of Morbid obesity (BMI > 40).    This patient is being managed with:   Diet Soft and Bite Sized-  Consistent Carbohydrate {Evening Snack} (CSTCHOSN)  Supplement Feeding Modality:  Oral  Ensure Max Cans or Servings Per Day:  1       Frequency:  Two Times a day  Entered: Apr 4 2025  3:13PM  
This patient has been assessed with a concern for Malnutrition and has been determined to have a diagnosis/diagnoses of Morbid obesity (BMI > 40).    This patient is being managed with:   Diet Soft and Bite Sized-  Supplement Feeding Modality:  Oral  Ensure Max Cans or Servings Per Day:  1       Frequency:  Two Times a day  Entered: Mar 24 2025  4:04PM  
This patient has been assessed with a concern for Malnutrition and has been determined to have a diagnosis/diagnoses of Morbid obesity (BMI > 40).    This patient is being managed with:   Diet NPO with Tube Feed-  Tube Feeding Modality: Gastrostomy  Cristal [facility] Peptide 1.5 (KFPEPT1.5RTH)  Total Volume for 24 Hours (mL): 1080  Continuous  Starting Tube Feed Rate {mL per Hour}: 35  Increase Tube Feed Rate by (mL): 10     Every 4 hours  Until Goal Tube Feed Rate (mL per Hour): 60  Tube Feed Duration (in Hours): 18  Tube Feed Start Time: 11:00  Tube Feed Stop Time: 05:00  Liquid Protein Supplement     Qty per Day:  4  Entered: Mar 10 2025 10:26AM  
This patient has been assessed with a concern for Malnutrition and has been determined to have a diagnosis/diagnoses of Morbid obesity (BMI > 40).    This patient is being managed with:   Diet NPO with Tube Feed-  Tube Feeding Modality: Gastrostomy  Cristal [facility] Peptide 1.5 (KFPEPT1.5RTH)  Total Volume for 24 Hours (mL): 1080  Continuous  Starting Tube Feed Rate {mL per Hour}: 35  Increase Tube Feed Rate by (mL): 10     Every 4 hours  Until Goal Tube Feed Rate (mL per Hour): 60  Tube Feed Duration (in Hours): 18  Tube Feed Start Time: 11:00  Tube Feed Stop Time: 05:00  Liquid Protein Supplement     Qty per Day:  4  Entered: Mar 10 2025 10:26AM  
This patient has been assessed with a concern for Malnutrition and has been determined to have a diagnosis/diagnoses of Morbid obesity (BMI > 40).    This patient is being managed with:   Diet NPO with Tube Feed-  Tube Feeding Modality: Gastrostomy  Cristal [facility] Peptide 1.5 (KFPEPT1.5RTH)  Total Volume for 24 Hours (mL): 1080  Continuous  Starting Tube Feed Rate {mL per Hour}: 35  Increase Tube Feed Rate by (mL): 10     Every 4 hours  Until Goal Tube Feed Rate (mL per Hour): 60  Tube Feed Duration (in Hours): 18  Tube Feed Start Time: 11:00  Tube Feed Stop Time: 05:00  Liquid Protein Supplement     Qty per Day:  4  Entered: Mar 10 2025 10:26AM  
This patient has been assessed with a concern for Malnutrition and has been determined to have a diagnosis/diagnoses of Morbid obesity (BMI > 40).    This patient is being managed with:   Diet NPO with Tube Feed-  Tube Feeding Modality: Orogastric  Peptamen Intense VHP  Total Volume for 24 Hours (mL): 840  Continuous  Starting Tube Feed Rate {mL per Hour}: 10  Increase Tube Feed Rate by (mL): 5     Every 4 hours  Until Goal Tube Feed Rate (mL per Hour): 35  Tube Feed Duration (in Hours): 24  Tube Feed Start Time: 00:00  Liquid Protein Supplement     Qty per Day:  3  Entered: Feb 26 2025  1:43PM  
This patient has been assessed with a concern for Malnutrition and has been determined to have a diagnosis/diagnoses of Morbid obesity (BMI > 40).    This patient is being managed with:   Diet NPO with Tube Feed-  Tube Feeding Modality: Orogastric  Peptamen Intense VHP  Total Volume for 24 Hours (mL): 840  Continuous  Starting Tube Feed Rate {mL per Hour}: 10  Increase Tube Feed Rate by (mL): 5     Every 4 hours  Until Goal Tube Feed Rate (mL per Hour): 35  Tube Feed Duration (in Hours): 24  Tube Feed Start Time: 00:00  Liquid Protein Supplement     Qty per Day:  3  Entered: Feb 26 2025  1:43PM  
This patient has been assessed with a concern for Malnutrition and has been determined to have a diagnosis/diagnoses of Morbid obesity (BMI > 40).    This patient is being managed with:   Diet NPO with Tube Feed-  Tube Feeding Modality: Orogastric  Peptamen Intense VHP  Total Volume for 24 Hours (mL): 840  Continuous  Starting Tube Feed Rate {mL per Hour}: 10  Increase Tube Feed Rate by (mL): 5     Every 4 hours  Until Goal Tube Feed Rate (mL per Hour): 35  Tube Feed Duration (in Hours): 24  Tube Feed Start Time: 00:00  Liquid Protein Supplement     Qty per Day:  3  Entered: Feb 26 2025  1:43PM    The following pending diet order is being considered for treatment of Morbid obesity (BMI > 40):  Diet NPO with Tube Feed-  Tube Feeding Modality: Orogastric  Peptamen Very High Protein (VHP)  Total Volume for 24 Hours (mL): 960  Continuous  Starting Tube Feed Rate {mL per Hour}: 30  Increase Tube Feed Rate by (mL): 10     Every hour  Until Goal Tube Feed Rate (mL per Hour): 40  Tube Feed Duration (in Hours): 24  Tube Feed Start Time: 11:00  Liquid Protein Supplement     Qty per Day:  4  Entered: Feb 28 2025 10:46AM  
This patient has been assessed with a concern for Malnutrition and has been determined to have a diagnosis/diagnoses of Morbid obesity (BMI > 40).    This patient is being managed with:   Diet NPO with Tube Feed-  Tube Feeding Modality: Orogastric  Peptamen Intense VHP  Total Volume for 24 Hours (mL): 840  Continuous  Starting Tube Feed Rate {mL per Hour}: 10  Increase Tube Feed Rate by (mL): 5     Every 4 hours  Until Goal Tube Feed Rate (mL per Hour): 35  Tube Feed Duration (in Hours): 24  Tube Feed Start Time: 00:00  Liquid Protein Supplement     Qty per Day:  3  Entered: Feb 26 2025  1:43PM    The following pending diet order is being considered for treatment of Morbid obesity (BMI > 40):  Diet NPO with Tube Feed-  Tube Feeding Modality: Orogastric  Peptamen Very High Protein (VHP)  Total Volume for 24 Hours (mL): 960  Continuous  Starting Tube Feed Rate {mL per Hour}: 30  Increase Tube Feed Rate by (mL): 10     Every hour  Until Goal Tube Feed Rate (mL per Hour): 40  Tube Feed Duration (in Hours): 24  Tube Feed Start Time: 11:00  Liquid Protein Supplement     Qty per Day:  4  Entered: Feb 28 2025 10:46AM  
This patient has been assessed with a concern for Malnutrition and has been determined to have a diagnosis/diagnoses of Morbid obesity (BMI > 40).    This patient is being managed with:   Diet NPO with Tube Feed-  Tube Feeding Modality: Orogastric  Petpamen Very High Protein (VHP)  Continuous  Until Goal Tube Feed Rate (mL per Hour): 35  Tube Feed Duration (in Hours): 24  Tube Feed Start Time: 15:00  Liquid Protein Supplement     Qty per Day:  4  Entered: Mar  8 2025  9:42AM  
This patient has been assessed with a concern for Malnutrition and has been determined to have a diagnosis/diagnoses of Morbid obesity (BMI > 40).    This patient is being managed with:   Diet NPO-  Entered: Mar  7 2025 10:38AM  
This patient has been assessed with a concern for Malnutrition and has been determined to have a diagnosis/diagnoses of Morbid obesity (BMI > 40).    This patient is being managed with:   Diet NPO-  Except Medications  Entered: Apr 14 2025 11:15AM  
This patient has been assessed with a concern for Malnutrition and has been determined to have a diagnosis/diagnoses of Morbid obesity (BMI > 40).    This patient is being managed with:   Diet Regular-  Consistent Carbohydrate {Evening Snack} (CSTCHOSN)  No Concentrated Phosphorus  Efrain(7 Gm Arginine/7 Gm Glut/1.2 Gm HMB     Qty per Day:  1  Supplement Feeding Modality:  Oral  Ensure Max Cans or Servings Per Day:  1       Frequency:  Two Times a day  Entered: Apr 21 2025  6:05PM  
This patient has been assessed with a concern for Malnutrition and has been determined to have a diagnosis/diagnoses of Morbid obesity (BMI > 40).    This patient is being managed with:   Diet Regular-  Consistent Carbohydrate {Evening Snack} (CSTCHOSN)  No Concentrated Phosphorus  Supplement Feeding Modality:  Oral  Ensure Max Cans or Servings Per Day:  1       Frequency:  Two Times a day  Entered: Apr 18 2025 10:29AM  
This patient has been assessed with a concern for Malnutrition and has been determined to have a diagnosis/diagnoses of Morbid obesity (BMI > 40).    This patient is being managed with:   Diet Soft and Bite Sized-  Consistent Carbohydrate {Evening Snack} (CSTCHOSN)  No Concentrated Phosphorus  Supplement Feeding Modality:  Oral  Ensure Max Cans or Servings Per Day:  1       Frequency:  Two Times a day  Entered: Apr 17 2025  3:16PM  
This patient has been assessed with a concern for Malnutrition and has been determined to have a diagnosis/diagnoses of Morbid obesity (BMI > 40).    This patient is being managed with:   Diet Soft and Bite Sized-  Consistent Carbohydrate {Evening Snack} (CSTCHOSN)  No Concentrated Phosphorus  Supplement Feeding Modality:  Oral  Ensure Max Cans or Servings Per Day:  1       Frequency:  Two Times a day  Entered: Apr 17 2025  3:16PM  
This patient has been assessed with a concern for Malnutrition and has been determined to have a diagnosis/diagnoses of Morbid obesity (BMI > 40).    This patient is being managed with:   Diet Soft and Bite Sized-  Consistent Carbohydrate {Evening Snack} (CSTCHOSN)  Supplement Feeding Modality:  Oral  Ensure Max Cans or Servings Per Day:  1       Frequency:  Two Times a day  Entered: Apr 16 2025  9:46AM  
This patient has been assessed with a concern for Malnutrition and has been determined to have a diagnosis/diagnoses of Morbid obesity (BMI > 40).    This patient is being managed with:   Diet Soft and Bite Sized-  Consistent Carbohydrate {Evening Snack} (CSTCHOSN)  Supplement Feeding Modality:  Oral  Ensure Max Cans or Servings Per Day:  1       Frequency:  Two Times a day  Entered: Apr 4 2025  3:13PM  
This patient has been assessed with a concern for Malnutrition and has been determined to have a diagnosis/diagnoses of Morbid obesity (BMI > 40).    This patient is being managed with:   Diet Soft and Bite Sized-  Entered: Mar 20 2025 12:51PM  
This patient has been assessed with a concern for Malnutrition and has been determined to have a diagnosis/diagnoses of Morbid obesity (BMI > 40).    This patient is being managed with:   Diet Soft and Bite Sized-  Entered: Mar 20 2025 12:51PM  
This patient has been assessed with a concern for Malnutrition and has been determined to have a diagnosis/diagnoses of Morbid obesity (BMI > 40).    This patient is being managed with:   Diet Soft and Bite Sized-  Supplement Feeding Modality:  Oral  Ensure Max Cans or Servings Per Day:  1       Frequency:  Two Times a day  Entered: Mar 24 2025  4:04PM  
This patient has been assessed with a concern for Malnutrition and has been determined to have a diagnosis/diagnoses of Morbid obesity (BMI > 40).    This patient is being managed with:   Diet Soft and Bite Sized-  Consistent Carbohydrate {Evening Snack} (CSTCHOSN)  Supplement Feeding Modality:  Oral  Ensure Max Cans or Servings Per Day:  1       Frequency:  Two Times a day  Entered: Apr 4 2025  3:13PM  
This patient has been assessed with a concern for Malnutrition and has been determined to have a diagnosis/diagnoses of Morbid obesity (BMI > 40).    This patient is being managed with:   Diet Soft and Bite Sized-  Consistent Carbohydrate {Evening Snack} (CSTCHOSN)  Supplement Feeding Modality:  Oral  Ensure Max Cans or Servings Per Day:  1       Frequency:  Two Times a day  Entered: Apr 4 2025  3:13PM  
This patient has been assessed with a concern for Malnutrition and has been determined to have a diagnosis/diagnoses of Morbid obesity (BMI > 40).    This patient is being managed with:   Diet Soft and Bite Sized-  Entered: Mar 20 2025 12:51PM  
This patient has been assessed with a concern for Malnutrition and has been determined to have a diagnosis/diagnoses of Morbid obesity (BMI > 40).    This patient is being managed with:   Diet Regular-  Consistent Carbohydrate {Evening Snack} (CSTCHOSN)  No Concentrated Phosphorus  Efrain(7 Gm Arginine/7 Gm Glut/1.2 Gm HMB     Qty per Day:  1  Supplement Feeding Modality:  Oral  Ensure Max Cans or Servings Per Day:  1       Frequency:  Two Times a day  Entered: Apr 21 2025  6:05PM

## 2025-04-25 NOTE — H&P ADULT - ASSESSMENT
Assessment/Plan:  SAL CHILDERS is a 39 year old male with PMH of morbid obesity, BEA on CPAP, pAFIB (not on AC), HTN, and BL papilledema attributed to pseudotumor cerebri; who initially presented to  on 2/24 with SOB and BL LE edema. Found to have URI with progressive SOB and multifocal PNA on imaging. His hospital course was complicated by worsening respiratory distress and increased 02 demands secondary to secretions (requiring multiple intubation attempts) and eventual MICU admission. While in MICU, he 02 requirements continued despite optimal vent management. Concern noted for progressive ARDS, unable to prone given morbid obesity, and ultimately cannulated for vvECMO on 2/25 and transferred to [facility] for further management on 2/26.    His hospital course at [facility] was significant for the following: diuretic and ABX management, s/p trache and PEG (placed on 3/7- PEG since removed), RCU admission, high grade fevers (secondary to panniculitis), progressively worsening sacral ulcer (likely osteomyelitis- s/p surgical debridement), BL foot wound (secondary to pressor use), neuropathy (secondary to critical illness polyneuropathy), ELLA (secondary to vancomycin toxicity and overdiuresis- since DCd- with improvement). Patient now admitted for a multidisciplinary rehab program. 04-25-25 @ 15:19  -------------    Rehab Management/MEDICAL MANAGEMENT     #Debility secondary to acute hypoxic respiratory failure  - Gait Instability, ADL impairments and Functional impairments: start Comprehensive Rehab Program of PT/OT  - 3 hours a day, 5 days a week  - P&O as needed   - BiPap at HS   - PRN: Nebulizer QID     #Sacral Osteomyelitis  - Zosyn TID - last dose evening of 5/11/25    #Paroxysmal AFIB  - Eliquis 5mg BID     #HTN  - Amlodipine 10mg dialy     #Sleep/Mood  - Melatonin 3mg at HS   - Quetiapine 25mg at HS     #Skin  - Skin on admission  - LUQ surgical wound previous site of PEG tube and right groin- Clean wound and periwound skin with NS. Pat dry. Cover with small silicone foam with border. Change every other day or prn if soiled     Sacral/gluteal cleft to bilateral buttocks- Irrigate deep cavity and tunneling with Dakins solution 1/4 strength using peribottle or flushes, cleanse wound and satellite ulcerations to b/l buttocks with NS, Dry well. Apply Liquid barrier film to periwound skin. Apply TRIAD barrier paste to isolated ulcer overlying left buttock,  Apply Aquacel hydrofiber sheet to right buttock, pack sacral/gluteal cleft including deep tunnels with Dakins 1/4 strength moistened kerlix, leave at least 2" out at end to ensure full removal of packing with subsequent dressing changes. Cover entire area (sacrum and right buttock) with abdominal pad and tegaderm. Change twice a day and prn if soiled/compromised. Once dressing is in place and perineal care is provided, clean remaining skin with perineal spray, apply TRIAD moisture barrier cream to exposed skin every shift and prn.    Bilateral abdominal pannus, bilateral groin: Cleanse with luke-warm soap and water, dry well. Apply clotrimazole cream daily, followed by Interdry textile sheeting, under intertriginous folds leaving 2 inches exposed at ends to wick, remove to wash & dry affected area, then replace. Individual sheeting may be used for up to 5 days unless soiled. Inspect skin daily.     - L foot wound- betadine to followed by 4x4 gauze, ABD pad, and matilde daily  - R foot wound- mupirocin BID     -Continue to offload pressure; bariatric low airloss support surface, turn and position per protocol with use of fluidized positioning devices, continue incontinence and moisture care per protocol and use of single breathable incontinence pads, continue to offload heels with fluidized positioning devices/Ochoa-Lock positioning device (PS# [id number]). Continue use of bariatric seat cushion (people soft #[id number]) and limit sit time- no more than 2 consecutive hours at any given time.   - Pressure injury/Skin: OOB to Chair, PT/OT     #Neuropathy  - Cymbalta 60mg daily   - Gabapentin 1200mg TID     #Pain Mgmt   - Capsaicin cream to BL feet QID - Avoid use on damaged, broken, or irritated skin. Avoid occlusive dressing or heat (NEEDS ORDER ON ARRIVAL)  - Lidocain patch to BL thighs   - PRN: Cyclobenzaprine 5mg TID; Oxycodone 5mg BID; Oxycodone 15mg daily (prior to dressing change); Tylenol 650mg QID     #GI/Bowel Mgmt   - Pantoprazole  - Bisacodyl 10mg at HS   - Miralax   - Naloxegal 25mg daily     #/Bladder Mgmt   #ELLA  - Renvela 800mg TID  -  PVR q8h, CIC>400cc    #FEN   - Diet - Regular + Thins  [CCHO]    - MVI     #Precautions / PROPHYLAXIS:   - Falls  - ortho: Weight bearing status: WBAT in surgical shoe   - Lungs: Aspiration, Incentive Spirometer   - DVT: Lovenox  ----------------------------------------------  Dr. Holder Liaison with Family/Providers:    -----------------------------------------------  OUTPATIENT/FOLLOW UP:    Your Primary Care Provider,   Phone: (   )    -  Fax: (   )    -  Follow Up Time: 1 week    [facility] [place],   [address] [place] [address]  Phone: [phone]  Fax: (   )    -  Follow Up Time:    Dr. Waterhouse  Podiatry  [phone]  Within 1 week   -----------------------------------------------  MEDICAL PROGNOSIS: GOOD                                   REHAB POTENTIAL: GOOD  ESTIMATED DISPOSITION: HOME                             ELOS: 10-14 Days   EXPECTED THERAPY:     P.T. 2hr/day       O.T. 1hr/day      S.L.P. 0hr/day      EXP FREQUENCY: 5 days per 7 day period     PRESCREEN COMPARISON: I have reviewed the prescreen information and I have found no relevant changes between the preadmission screening and my post admission evaluation     RATIONALE FOR INPATIENT ADMISSION - Patient demonstrates the following: (check all that apply)  [X] Medically appropriate for rehabilitation admission  [X] Has attainable rehab goals with an appropriate initial discharge plan  [X] Has rehabilitation potential (expected to make a significant improvement within a reasonable period of time)   [X] Requires close medical managment by a rehab physician, rehab nursing care, Hospitalist and comprehensive interdisciplinary team (including PT, OT, & or SLP, Prosthetics and Orthotics)     Assessment/Plan:  SAL CHILDERS is a 39 year old male with PMH of morbid obesity, BEA on CPAP, pAFIB (not on AC), HTN, and BL papilledema attributed to pseudotumor cerebri; who initially presented to  on 2/24 with SOB and BL LE edema. Found to have URI with progressive SOB and multifocal PNA on imaging. His hospital course was complicated by worsening respiratory distress and increased 02 demands secondary to secretions (requiring multiple intubation attempts) and eventual MICU admission. While in MICU, he 02 requirements continued despite optimal vent management. Concern noted for progressive ARDS, unable to prone given morbid obesity, and ultimately cannulated for vvECMO on 2/25 and transferred to [facility] for further management on 2/26.    His hospital course at [facility] was significant for the following: diuretic and ABX management, s/p trache and PEG (placed on 3/7- PEG since removed), RCU admission, high grade fevers (secondary to panniculitis), progressively worsening sacral ulcer (likely osteomyelitis- s/p surgical debridement), BL foot wound (secondary to pressor use), neuropathy (secondary to critical illness polyneuropathy), ELLA (secondary to vancomycin toxicity and overdiuresis- since DCd- with improvement). Patient now admitted for a multidisciplinary rehab program. 04-25-25 @ 15:19  -------------    Rehab Management/MEDICAL MANAGEMENT     #Debility secondary to acute hypoxic respiratory failure  - Gait Instability, ADL impairments and Functional impairments: start Comprehensive Rehab Program of PT/OT  - 3 hours a day, 5 days a week  - P&O as needed   - BiPap at HS   - PRN: Nebulizer QID     #Sacral Osteomyelitis  - Zosyn TID - last dose evening of 5/11/25    #Paroxysmal AFIB  - Eliquis 5mg BID     #HTN  - Amlodipine 10mg dialy     #Sleep/Mood  - Melatonin 3mg at HS   - Quetiapine 25mg at HS     #Skin  - Skin on admission  - LUQ surgical wound previous site of PEG tube and right groin- Clean wound and periwound skin with NS. Pat dry. Cover with small silicone foam with border. Change every other day or prn if soiled     Sacral/gluteal cleft to bilateral buttocks- Irrigate deep cavity and tunneling with Dakins solution 1/4 strength using peribottle or flushes, cleanse wound and satellite ulcerations to b/l buttocks with NS, Dry well. Apply Liquid barrier film to periwound skin. Apply TRIAD barrier paste to isolated ulcer overlying left buttock,  Apply Aquacel hydrofiber sheet to right buttock, pack sacral/gluteal cleft including deep tunnels with Dakins 1/4 strength moistened kerlix, leave at least 2" out at end to ensure full removal of packing with subsequent dressing changes. Cover entire area (sacrum and right buttock) with abdominal pad and tegaderm. Change twice a day and prn if soiled/compromised. Once dressing is in place and perineal care is provided, clean remaining skin with perineal spray, apply TRIAD moisture barrier cream to exposed skin every shift and prn.    Bilateral abdominal pannus, bilateral groin: Cleanse with luke-warm soap and water, dry well. Apply clotrimazole cream daily, followed by Interdry textile sheeting, under intertriginous folds leaving 2 inches exposed at ends to wick, remove to wash & dry affected area, then replace. Individual sheeting may be used for up to 5 days unless soiled. Inspect skin daily.     - L foot wound- betadine to followed by 4x4 gauze, ABD pad, and matilde daily  - R foot wound- mupirocin BID     -Continue to offload pressure; bariatric low airloss support surface, turn and position per protocol with use of fluidized positioning devices, continue incontinence and moisture care per protocol and use of single breathable incontinence pads, continue to offload heels with fluidized positioning devices/Ochoa-Lock positioning device (PS# [id number]). Continue use of bariatric seat cushion (people soft #[id number]) and limit sit time- no more than 2 consecutive hours at any given time.   - Pressure injury/Skin: OOB to Chair, PT/OT   -consulting plastic surgery    #Neuropathy  - Cymbalta 60mg daily   - Gabapentin 1200mg TID     #Pain Mgmt   - Capsaicin cream to BL feet QID - Avoid use on damaged, broken, or irritated skin. Avoid occlusive dressing or heat   - Lidocaine patch to BL thighs   - PRN: Cyclobenzaprine 5mg TID; Oxycodone 5mg BID; Oxycodone 15mg daily (prior to dressing change); Tylenol 650mg QID     #GI/Bowel Mgmt   - Pantoprazole  - Bisacodyl 10mg at HS   - Miralax   - Naloxegal 25mg daily     #/Bladder Mgmt   #ELLA  - Renvela 800mg TID  -  PVR q8h, CIC>400cc    #FEN   - Diet - Regular + Thins  [CCHO]    - MVI     #Precautions / PROPHYLAXIS:   - Falls  - ortho: Weight bearing status: WBAT in surgical shoe   - Lungs: Aspiration, Incentive Spirometer   - DVT: Lovenox  ----------------------------------------------  Dr. Holder Liaison with Family/Providers:    -----------------------------------------------  OUTPATIENT/FOLLOW UP:    Your Primary Care Provider,   Phone: (   )    -  Fax: (   )    -  Follow Up Time: 1 week    [facility] [address]  Phone: [phone]  Fax: (   )    -  Follow Up Time:    Dr. Waterhouse  Podiatry  [phone]  Within 1 week   -----------------------------------------------  MEDICAL PROGNOSIS: GOOD                                   REHAB POTENTIAL: GOOD  ESTIMATED DISPOSITION: HOME                             ELOS: 10-14 Days   EXPECTED THERAPY:     P.T. 2hr/day       O.T. 1hr/day      S.L.P. 0hr/day      EXP FREQUENCY: 5 days per 7 day period     PRESCREEN COMPARISON: I have reviewed the prescreen information and I have found no relevant changes between the preadmission screening and my post admission evaluation     RATIONALE FOR INPATIENT ADMISSION - Patient demonstrates the following: (check all that apply)  [X] Medically appropriate for rehabilitation admission  [X] Has attainable rehab goals with an appropriate initial discharge plan  [X] Has rehabilitation potential (expected to make a significant improvement within a reasonable period of time)   [X] Requires close medical managment by a rehab physician, rehab nursing care, Hospitalist and comprehensive interdisciplinary team (including PT, OT, & or SLP, Prosthetics and Orthotics)

## 2025-04-25 NOTE — PROGRESS NOTE ADULT - TIME BILLING
Review of patient records, including history, laboratory data, imaging. Patient evaluation and assessment. Coordination of care. Time excludes teaching.
Reviewing the EMR, vitals, imaging, medication list recent labs, prior records, and coordinating care with medical providers. This time excludes procedures and teaching.
Reviewing the chart, interpreting lab data, discussing case with team, interview and examination of patient, and documentation.
Review of patient records, including history, laboratory data, and imaging. Patient evaluation and assessment. Coordination of care. Time excludes teaching or procedures.
Reviewing the chart, interpreting lab data, discussing case with team, interview and examination of patient, and documentation.
Reviewing the chart, interpreting lab data, discussing case with team, interview and examination of patient, and documentation.
Reviewing the chart, interpreting lab data, discussing case with patient's significant other, interview and examination of patient, and documentation.
Reviewing the chart, interpreting lab data, discussing case with pharmacy, pt's girlfriend, interview and examination of patient, and documentation.
Review of patient records, including history, laboratory data, and imaging. Patient evaluation and assessment. Coordination of care. Time excludes teaching or procedures.
Review of patient records, including history, laboratory data, and imaging. Patient evaluation and assessment. Coordination of care. Time excludes teaching or procedures.
Review of the medical record, interpretation of labs, imaging studies, discussion with interdisciplinary team, physical exam and medical decision making as above
Review of the medical record, interpretation of labs, imaging studies, discussion with interdisciplinary team, physical exam and medical decision making as above
Reviewing the chart, interpreting lab data, discussing case with wound care, interview and examination of patient, and documentation.
Review of patient records, including history, laboratory data, and imaging. Patient evaluation and assessment. Coordination of care. Time excludes teaching or procedures.
Review of patient records, including history, laboratory data, and imaging. Patient evaluation and assessment. Coordination of care. Time excludes teaching or procedures.
Review of patient records, including history, laboratory data, imaging. Patient evaluation and assessment. Coordination of care. Time excludes teaching.
Review of patient records, including history, laboratory data, and imaging. Patient evaluation and assessment. Coordination of care. Time excludes teaching or procedures.
Review of patient records, including history, laboratory data, and imaging. Patient evaluation and assessment. Coordination of care. Time excludes teaching or procedures.
Review of patient records, including history, laboratory data, imaging. Patient evaluation and assessment. Coordination of care. Time excludes teaching.
Review of the medical record, interpretation of labs, imaging studies, discussion with interdisciplinary team, physical exam and medical decision making as above
Review of patient records, including history, laboratory data, and imaging. Patient evaluation and assessment. Coordination of care. Time excludes teaching or procedures.
Review of patient records, including history, laboratory data, imaging. Patient evaluation and assessment. Coordination of care. Time excludes teaching.
Reviewing chart, labs, imaging and evaluating patient
Reviewing the EMR, vitals, imaging, medication list, recent labs, prior records and coordinating care with medical providers. This time excludes procedures and teaching.
Review of patient records, including history, laboratory data, and imaging. Patient evaluation and assessment. Coordination of care. Time excludes teaching or procedures.
Review of patient records, including history, laboratory data, and imaging. Patient evaluation and assessment. Coordination of care. Time excludes teaching or procedures.
Review of patient records, including history, laboratory data, imaging. Patient evaluation and assessment. Coordination of care. Time excludes teaching.
Review of the medical record, interpretation of labs, imaging studies, discussion with interdisciplinary team, physical exam and medical decision making as above
Review of the medical record, interpretation of labs, imaging studies, discussion with interdisciplinary team, physical exam and medical decision making as above
Reviewing chart, labs, imaging and evaluating patient
Reviewing the EMR, vitals, imaging, medication list recent labs, prior records, and coordinating care with medical providers. This time excludes procedures and teaching.
Reviewing the EMR, vitals, imaging, medication list, recent labs, prior records and coordinating care with medical providers. This time excludes procedures and teaching.
Review of patient records, including history, laboratory data, and imaging. Patient evaluation and assessment. Coordination of care. Time excludes teaching or procedures.
Reviewing the EMR, vitals, imaging, medication list, recent labs, prior records and coordinating care with medical providers. This time excludes procedures and teaching.
Reviewing the EMR, vitals, imaging, medication list, recent labs, prior records and coordinating care with medical providers. This time excludes procedures and teaching.
Review of patient records, including history, laboratory data, and imaging. Patient evaluation and assessment. Coordination of care. Time excludes teaching or procedures.
Review of the medical record, interpretation of labs, imaging studies, discussion with interdisciplinary team, physical exam and medical decision making as above
Review of the medical record, interpretation of labs, imaging studies, discussion with interdisciplinary team, physical exam and medical decision making as above
Reviewing the EMR, vitals, imaging, medication list, recent labs, prior records and coordinating care with medical providers. This time excludes procedures and teaching.
Review of patient records, including history, laboratory data, and imaging. Patient evaluation and assessment. Coordination of care. Time excludes teaching or procedures.
Reviewing the EMR, vitals, imaging, medication list, recent labs, prior records and coordinating care with medical providers. This time excludes procedures and teaching.
Time spent for extensive review of the physical chart, electronic medical record, and documentation to obtain collateral information including but not limited to:  [x ] Inpatient records (ED, H&P, primary team, and consultants if applicable, care coordination)  [x] Inpatient values/results (biomarkers, immunoassays, imaging, and microbiology results)  [x] Current or proposed treatment plans  [x] Discussion with the primary team  [x] Discussion with the patient, surrogate decision maker, or family    Time spent: >50 mins
Reviewing the EMR, vitals, imaging, medication list, recent labs, prior records and coordinating care with medical providers. This time excludes procedures and teaching.
Time spent for extensive review of the physical chart, electronic medical record, and documentation to obtain collateral information including but not limited to:  [x ] Inpatient records (ED, H&P, primary team, and consultants if applicable, care coordination)  [x] Inpatient values/results (biomarkers, immunoassays, imaging, and microbiology results)  [x] Current or proposed treatment plans  [x] Discussion with the primary team  [x] Discussion with the patient, surrogate decision maker, or family    Time spent: >50 mins
Reviewing the EMR, vitals, imaging, medication list, recent labs, prior records and coordinating care with medical providers. This time excludes procedures and teaching.
Time spent for extensive review of the physical chart, electronic medical record, and documentation to obtain collateral information including but not limited to:  [x ] Inpatient records (ED, H&P, primary team, and consultants if applicable, care coordination)  [x] Inpatient values/results (biomarkers, immunoassays, imaging, and microbiology results)  [x] Current or proposed treatment plans  [x] Discussion with the primary team  [x] Discussion with the patient, surrogate decision maker, or family    Time spent: >75 mins
Reviewing the EMR, vitals, imaging, medication list, recent labs, prior records and coordinating care with medical providers. This time excludes procedures and teaching.

## 2025-04-25 NOTE — H&P ADULT - NSHPLABSRESULTS_GEN_ALL_CORE
LABS:                        8.7    7.72  )-----------( 344      ( 24 Apr 2025 05:28 )             29.1     04-24    139  |  97[L]  |  10  ----------------------------<  100[H]  3.7   |  26  |  1.26    Ca    10.0      24 Apr 2025 05:28  Phos  5.0     04-24  Mg     1.80     04-24    Urinalysis Basic - ( 24 Apr 2025 05:28 )    Color: x / Appearance: x / SG: x / pH: x  Gluc: 100 mg/dL / Ketone: x  / Bili: x / Urobili: x   Blood: x / Protein: x / Nitrite: x   Leuk Esterase: x / RBC: x / WBC x   Sq Epi: x / Non Sq Epi: x / Bacteria: x      IMAGING/RADIOLOGY:  MR PELVIS (4/11/25)  IMPRESSION:  1.  No osteomyelitis.  2.  Sacral decubitus wound is again seen extending superiorly along the   superficial margin of the right gluteus zurdo to the level of L5 with   small amount of fluid and gas within the tract.  3.  The underlying gluteus zurdo demonstrates a region of decreased   enhancement consistent with ischemia or necrosis measuring up to 11.9 cm   transversely.  4.  Muscle edema suggests a nonspecific myositis.  5.  Small bilateral hip joint effusions with mild synovitis is   nonspecific.    NM WHOLEBODY INFLAMMATORY (4/8/25)  IMPRESSION:  1. Large area of soft tissue infection in the right side of the pelvis as   detailed above. Slightly more intense activity in the right iliac bone   compared to the left raises possibility of osteomyelitis.  2. Intense activity in the lateral aspect of the right forefoot   compatible with soft tissue infection; complicating osteomyelitis is not   excluded. Milder activity in the medial aspect of the left forefoot may   reflect soft tissue infection. Please correlate findings with clinical   examination.  3.No evidence of pneumonia.    CT CHEST/ABDOMEN & PELVIS (4/3/25)  IMPRESSION:  Bilateral lung patchy opacities and consolidation, mildly decreased from   prior exam.  Limited field-of-view of the sacral soft tissues due to body habitus.   Visualized soft tissues demonstrating subcutaneous fat stranding and   small droplets of air. No drainable fluid collection is seen.    MR LUMBAR SPINE (3/30/25)  IMPRESSION:  Limited examination due to lack of axial imaging. Mild L3-4 and moderate   L4-5 disc degeneration with bulges that narrow the BILATERAL neural   foramina due to a mild degree at L3-4 and moderate degree at L4-5.   Superimposed central disc herniation at L4-5 contributes to mild central   stenosis at this level.    CARDIOLOGY:  TTE (3/17/25)  CONCLUSIONS:    1. No evidence of aortic valve vegetation.   2. No evidence of pulmonary valve vegetation.   3. No evidence of tricuspid vegetation.   4. There is no evidence of any mitral valve vegetations.  :

## 2025-04-25 NOTE — PROGRESS NOTE ADULT - ASSESSMENT
38 YO M with PMHx of morbid obesity, BEA on CPAP, pAFIB (not on AC), HTN, and BL papilledema attributed to pseudotumor cerebri who presented initially to [facility] on 2/24 with SOB and BL LE edema. Found with URI outpatient with progressive SOB, and concern for noted for MFPNA on imaging. Course progressed with AHRF with worsening O2 demand, respiratory distress, secretions, and somnolence requiring intubation (difficult with success after 6 attempts) in ED and admission to [facility] MICU. While in MICU, patient noted with increasing O2 demand with no improvement despite optimal vent management. Concern noted for progressive ARDS, unable to prone given morbid obesity, and ultimately cannulated for vvECMO on 2/25 and transferred to [facility] for further management on 2/26. While at [facility], tx with diuresis and ABX, and ultimately decannulated and s/p 60XLTCP trach and PEG on 3/7. Patient ultimately transferred to RCU on 3/11 and course complicated by recurrent high grade fevers and found with fungemia likely from panniculitis. Course further complicated by progression of sacrum ulcer with possible OM and s/p surgical debridement. Lastly course complicated by BL feet pressor wound and CIPN. Pain medications continued and podiatry recc local wound care.     NEUROLOGY  # Hx of Pseudotumor Cerebri   - s/p LP in 2024 with high opening pressure  - s/p MRI 2024 with possible pituitary mass but unclear because of pressure effect from pseudotumor cerebri causing partially empty sella.  - Prolactin elevated   - ACTH low but received steroids during admission   - FT4 low normal and TSH normal, but given FT4 low normal TSH should be higher and concern for likely inadequate response.   - Endo work up outpatient.     # Sedation   - Weaned off sedation in ICU   - Nimbex turned off 2/27, Prop turned off 3/9   - Precedex turned off and catapres started 3/11  - Catapres weaned off on 4/3     # Insomnia   - Patient worked night shift x 15 years   - Trouble sleeping at night leading to day time sleepiness and poor participation with PT and continued on seroquel 25 QHS with improvement.     # BL LE neuropathy   - CIPN concern   - Continue on Oxy 5mg PRN BID  - Continue on Gabapentin to 1200mg TID  - Continue on Cymbalta 60 mg QD   - Continue on Flexeril 5mg Q8H PRN  - Pain management following     CARDIOVASCULAR  # HTN   - HTN noted in ICU requiring Cardene and esmolol GTTs   - Catapres turned off as above  - Continue on Norvasc 10   - Monitor BP     # AFIB   - Hx of pAFIB   - No RVR episodes or pAFIB episodes in ICU   - No rate control medications needed  - Monitor on telemetry     # RV dilation second to PHTN   - POCUS on arrival to [facility] with RVE concern   - TTE 10/2024 with EF 55-60 with normal LVSF, moderate LVH and normal RVSF and size with no concern for pHTN   - TTE on 2/25 at  with EF 61 and normal LVSF, but enlarged RV size with reduced RVSF, mild TR, mild PHTN with PASP 49, and dilated IVC to 3.4cn, but normal TAPSE 2.1.  - Continue on aggressive diuresis in ICU    - TTE 3/11 with RVSF reduced with TAPSE 1.6 and IVC improved to 2.12cm.   - Continue on lasix 40 PO QD , however dc'ed on 4/7 second to ELLA.   - Monitor volume status    RESPIRATORY  # ARDS second to MFPNA   - Hx of BEA on CPAP presented to  post URI with SOB and found with AHRF with progression to ARDS second to post viral MFPNA   - Intubated 2/25   - Cannulated for vvECMO 2/26   - s/p 60XLTCP tracheostomy 3/7   - s/p ECMO decannulation 3/7   - s/p trach decannulation on 3/28  - [id number] RA with BIPAP 18/10 QHS    GI  # Dysphagia   - s/p PEG 3/7   - s/p FEEST 3/20 and passed   - s/p PEG removal on 4/15     # Constipation   - Continue on miralax BID   - Continue on movantik and dulcolax   - Monitor BMs    # Mild transaminitis   - LFTs elevated in ICU with TBILI elevated likely from ECMO hemolysis   - US ABD with hepatic steatosis   - Trend LFTs      RENAL  # ELLA   - ELLA likely from cardiorenal with RVE   - Diuresed in ICU and improved   - Monitor renal function and UOP   - Recurrent ELLA noted with suspected vancomycin toxicity. Lasix and vanco stopped and now renal function normalized.     # Hypernatremia   - Hypernatremia with fever spikes   - Fever curve improved and weaned off FWF    INFECTIOUS DISEASE  # Recurrent fever second to OM?   - Persistent fevers despite fungemia tx as below   - RPT panCT with no source   - Bone scan 4/8 with LARGE soft tissue infection in the R side of the pelvis and slightly more intense in the right iliac bone with concern for possible oM .   - MRI PELVIS 4/11 with large wound but no OM   - Case discussed at length with ID and sx and s/p surgical wound debridement on 4/15   - Wound cx with Providencia and Bacteroides   - Holding further vanco given ELLA and no MRSA  - Continue on zosyn through 5/11 for 6 week course     # Fungemia second to panniculitis   - Recurrent fevers noted.   - RPT SCx, UA, CXR and RVP negative  - TTE 3/17 with no IE  - PanCT 3/17 with PNA and persistent panniculitis  - BCx 3/15 and 3/16 with candida albicans  - RPT BCx 3/18 and 3/20 negative     # Recurrent fevers   - Recurrent fevers post ECMO decannulation thought initially to be cytokine surge   - BCx, SCx, UCx, RVP and MRSA on 3/12 negative   - Completed zosyn (3/12-3/18) empirically with improved WBC   - Completed caspo 3/15-4/15    # MFPNA and abdominal pannus cellulitis   - Presented to  post URI with SOB and found with AHRF with progression to ARDS second to post viral MFPNA   - RVP, legionella, strept, BAL, BCx, UCx, HIV, PCP, and adenovirus PCR negative   - Initially started on multiple agents in ICU and ultimately completed zosyn (2/26-3/9) ZMAX (2/25-2/26) and Vanco (2/26-3/1)     # Penile discharge   - GC/ chlamydia negative  - HIV negative   - Monitor for now    # PPX   - MRSA PCR positive and completed Bactroban (2/26-3/3)     HEME   # Anemia likely second to ECMO circuit vs AOCD   - HH low in ICU second to ECMO circuit   - Anemia panel with AOCD  - Trend HH     VASCULAR   # R IJ and BL LE DVTs   - Post ECMO dopplers on 3/9 negative for BL UE/ LE DVTs.   - Subsequent post ECMO dopplers performed on 3/14 and noted with acute, non-occlusive deep vein thrombosis noted within the right internal jugular vein. Acute, occlusive deep vein thromboses noted within the right and left peroneal veins at both mid calves, and age-indeterminate, occlusive deep vein thrombosis visualized within the left gastrocnemius vein at the proximal calf.     - s/p argatroban GTT   - Continue on Eliquis     PODIATRY   # BL plantar feet blisters likely pressors induced  - Seen by podiatry and blisters lanced on 3/4, 3/18 and 3/28  - Continue on local wound care and WBAT per podiatry     ENDOCRINE  # Pre-DM2   - A1C 6.7%  - Continue on ISS, Monitor FS   - IF no demand then stop ISS     LINES/ TUBES  - R IJ and R FEM ECMO (2/26-3/7)     SKIN  # Pannus canndial intertrigo   - Continue on local wound care     # Sacrum/ buttock MAD  - Surgical debridement of sacrum on 4/15  - Deep tunnelling remains as of 4/21   - Continue on local wound care  - May need wound vacc outpatient    ETHICS/ GOC    - FULL CODE     DISPO - PT and PMR following and goal for acute  40 YO M with PMHx of morbid obesity, BEA on CPAP, pAFIB (not on AC), HTN, and BL papilledema attributed to pseudotumor cerebri who presented initially to [facility] on 2/24 with SOB and BL LE edema. Found with URI outpatient with progressive SOB, and concern for noted for MFPNA on imaging. Course progressed with AHRF with worsening O2 demand, respiratory distress, secretions, and somnolence requiring intubation (difficult with success after 6 attempts) in ED and admission to [facility] MICU. While in MICU, patient noted with increasing O2 demand with no improvement despite optimal vent management. Concern noted for progressive ARDS, unable to prone given morbid obesity, and ultimately cannulated for vvECMO on 2/25 and transferred to [facility] for further management on 2/26. While at [facility], tx with diuresis and ABX, and ultimately decannulated and s/p 60XLTCP trach and PEG on 3/7. Patient ultimately transferred to RCU on 3/11 and course complicated by recurrent high grade fevers and found with fungemia likely from panniculitis. Course further complicated by progression of sacrum ulcer with possible OM and s/p surgical debridement. Lastly course complicated by BL feet pressor wound and CIPN. Pain medications continued and podiatry recc local wound care.     NEUROLOGY  # Hx of Pseudotumor Cerebri   - s/p LP in 2024 with high opening pressure  - s/p MRI 2024 with possible pituitary mass but unclear because of pressure effect from pseudotumor cerebri causing partially empty sella.  - Prolactin elevated   - ACTH low but received steroids during admission   - FT4 low normal and TSH normal, but given FT4 low normal TSH should be higher and concern for likely inadequate response.   - Endo work up outpatient.     # Sedation   - Weaned off sedation in ICU   - Nimbex turned off 2/27, Prop turned off 3/9   - Precedex turned off and catapres started 3/11  - Catapres weaned off on 4/3     # Insomnia   - Patient worked night shift x 15 years   - Trouble sleeping at night leading to day time sleepiness and poor participation with PT and continued on seroquel 25 QHS with improvement.     # BL LE neuropathy   - CIPN concern   - Continue on Oxy 5mg PRN BID for dressing changes with Oxy 15 Q6H PRN severe pain   - Continue on Gabapentin to 1200mg TID  - Continue on Cymbalta 60 mg QD   - Continue on Flexeril 5mg Q8H PRN  - Pain management following     CARDIOVASCULAR  # HTN   - HTN noted in ICU requiring Cardene and esmolol GTTs   - Catapres turned off as above  - Continue on Norvasc 10   - Monitor BP     # AFIB   - Hx of pAFIB   - No RVR episodes or pAFIB episodes in ICU   - No rate control medications needed  - Monitor on telemetry     # RV dilation second to PHTN   - POCUS on arrival to [facility] with RVE concern   - TTE 10/2024 with EF 55-60 with normal LVSF, moderate LVH and normal RVSF and size with no concern for pHTN   - TTE on 2/25 at  with EF 61 and normal LVSF, but enlarged RV size with reduced RVSF, mild TR, mild PHTN with PASP 49, and dilated IVC to 3.4cn, but normal TAPSE 2.1.  - Continue on aggressive diuresis in ICU    - TTE 3/11 with RVSF reduced with TAPSE 1.6 and IVC improved to 2.12cm.   - Continue on lasix 40 PO QD , however dc'ed on 4/7 second to ELLA.   - Monitor volume status    RESPIRATORY  # ARDS second to MFPNA   - Hx of BEA on CPAP presented to  post URI with SOB and found with AHRF with progression to ARDS second to post viral MFPNA   - Intubated 2/25   - Cannulated for vvECMO 2/26   - s/p 60XLTCP tracheostomy 3/7   - s/p ECMO decannulation 3/7   - s/p trach decannulation on 3/28  - [id number] RA with BIPAP 18/10 QHS    GI  # Dysphagia   - s/p PEG 3/7   - s/p FEEST 3/20 and passed   - s/p PEG removal on 4/15     # Constipation   - Continue on miralax BID   - Continue on movantik and dulcolax   - Monitor BMs    # Mild transaminitis   - LFTs elevated in ICU with TBILI elevated likely from ECMO hemolysis   - US ABD with hepatic steatosis   - Trend LFTs      RENAL  # ELLA   - ELLA likely from cardiorenal with RVE   - Diuresed in ICU and improved   - Monitor renal function and UOP   - Recurrent ELLA noted with suspected vancomycin toxicity. Lasix and vanco stopped and now renal function normalized.     # Hypernatremia   - Hypernatremia with fever spikes   - Fever curve improved and weaned off FWF    INFECTIOUS DISEASE  # Recurrent fever second to OM?   - Persistent fevers despite fungemia tx as below   - RPT panCT with no source   - Bone scan 4/8 with LARGE soft tissue infection in the R side of the pelvis and slightly more intense in the right iliac bone with concern for possible oM .   - MRI PELVIS 4/11 with large wound but no OM   - Case discussed at length with ID and sx and s/p surgical wound debridement on 4/15   - Wound cx with Providencia and Bacteroides   - Holding further vanco given ELLA and no MRSA  - Continue on zosyn through 5/11 for 6 week course     # Fungemia second to panniculitis   - Recurrent fevers noted.   - RPT SCx, UA, CXR and RVP negative  - TTE 3/17 with no IE  - PanCT 3/17 with PNA and persistent panniculitis  - BCx 3/15 and 3/16 with candida albicans  - RPT BCx 3/18 and 3/20 negative     # Recurrent fevers   - Recurrent fevers post ECMO decannulation thought initially to be cytokine surge   - BCx, SCx, UCx, RVP and MRSA on 3/12 negative   - Completed zosyn (3/12-3/18) empirically with improved WBC   - Completed caspo 3/15-4/15    # MFPNA and abdominal pannus cellulitis   - Presented to  post URI with SOB and found with AHRF with progression to ARDS second to post viral MFPNA   - RVP, legionella, strept, BAL, BCx, UCx, HIV, PCP, and adenovirus PCR negative   - Initially started on multiple agents in ICU and ultimately completed zosyn (2/26-3/9) ZMAX (2/25-2/26) and Vanco (2/26-3/1)     # Penile discharge   - GC/ chlamydia negative  - HIV negative   - Monitor for now    # PPX   - MRSA PCR positive and completed Bactroban (2/26-3/3)     HEME   # Anemia likely second to ECMO circuit vs AOCD   - HH low in ICU second to ECMO circuit   - Anemia panel with AOCD  - Trend HH     VASCULAR   # R IJ and BL LE DVTs   - Post ECMO dopplers on 3/9 negative for BL UE/ LE DVTs.   - Subsequent post ECMO dopplers performed on 3/14 and noted with acute, non-occlusive deep vein thrombosis noted within the right internal jugular vein. Acute, occlusive deep vein thromboses noted within the right and left peroneal veins at both mid calves, and age-indeterminate, occlusive deep vein thrombosis visualized within the left gastrocnemius vein at the proximal calf.     - s/p argatroban GTT   - Continue on Eliquis     PODIATRY   # BL plantar feet blisters likely pressors induced  - Seen by podiatry and blisters lanced on 3/4, 3/18 and 3/28  - Continue on local wound care and WBAT per podiatry     ENDOCRINE  # Pre-DM2   - A1C 6.7%  - Continue on ISS, Monitor FS   - IF no demand then stop ISS     LINES/ TUBES  - R IJ and R FEM ECMO (2/26-3/7)     SKIN  # Pannus canndial intertrigo   - Continue on local wound care     # Sacrum/ buttock MAD  - Surgical debridement of sacrum on 4/15  - Deep tunnelling remains as of 4/21   - Continue on local wound care  - May need wound vacc outpatient    ETHICS/ GOC    - FULL CODE     DISPO - PT and PMR following and goal for acute. Stable for DC to rehab today.

## 2025-04-25 NOTE — H&P ADULT - HISTORY OF PRESENT ILLNESS
Sunny Quintanilla is a 39 year old male with PMH of morbid obesity, BEA on CPAP, pAFIB (not on AC), HTN, and BL papilledema attributed to pseudotumor cerebri; who initially presented to  on 2/24 with SOB and BL LE edema. Found to have URI with progressive SOB and multifocal PNA on imaging. His hospital course was complicated by worsening respiratory distress and increased 02 demands secondary to secretions (requiring multiple intubation attempts) and eventual MICU admission. While in MICU, he 02 requirements continued despite optimal vent management. Concern noted for progressive ARDS, unable to prone given morbid obesity, and ultimately cannulated for vvECMO on 2/25 and transferred to [facility] for further management on 2/26.    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 PM&R was consulted and deemed him an appropriate candidate for IRF. He was medically stabilized and cleared for discharge to [facility].

## 2025-04-25 NOTE — H&P ADULT - NSHPREVIEWOFSYSTEMS_GEN_ALL_CORE
REVIEW OF SYSTEMS  Constitutional: No fever, No Chills, No fatigue  HEENT: No eye pain, No visual disturbances, No difficulty hearing  Pulm: No cough,  No shortness of breath  Cardio: No chest pain, No palpitations  GI:  No abdominal pain, No nausea, No vomiting, No diarrhea, No constipation  : No dysuria, No frequency, No hematuria  Neuro: No headaches, No memory loss, + loss of strength, + numbness, No tremors  Skin: No itching, No rashes, + lesions   Endo: No temperature intolerance  MSK: No joint pain, +BLE swelling, + muscle pain, +back pain  Psych:  No depression, No anxiety

## 2025-04-25 NOTE — H&P ADULT - NSHPPHYSICALEXAM_GEN_ALL_CORE
Gen - NAD, Comfortable, obese  HEENT - NCAT, EOMI, MMM  Neck - Supple, No limited ROM  Pulm - CTAB, No wheeze, No rhonchi, No crackles  Cardiovascular - RRR, S1S2, No murmurs  Abdomen - Soft, NT/ND, +BS  Extremities - chronic skin changes consistent with elephantitis  Neuro-     Cognitive - AAOx3     Communication - Fluent, No dysarthria     Attention: Intact      Judgement: Good evidence of judgement     Motor -                     LEFT    UE - ShAB 5/5, EF 5/5, EE 5/5, WE 5/5,  5/5                    RIGHT UE - ShAB 5/5, EF 5/5, EE 5/5, WE 5/5,  5/5                    LEFT    LE - HF 3/5, KE 2/5 limited by bed size and body habitus, DF 1/5, PF 5/5                    RIGHT LE - HF 3/5, KE 2/5 limited by bed size and body habitus , DF 1/5, PF 5/5        Sensory - Decreased to light touch bilateral lower extremities      Coordination - impaired lower extremities   Psychiatric - Mood stable, Affect WNL    Skin:  R foot plantar aspect lateral/distal side 7cm x 5.5cm ischemic wound from pressors  RLE 4th digit 1x1.5cm ischemic induced wound from pressors  RLE 5th digit 3x2cm ischemic induced wound from pressors  L foot plantar aspect lateral/distal side 6x7cm ischemic induced wound from pressor usage  LLE 4th digit 2x1cm ischemic induced wound from pressors  LLE 5th digit 2.5x3cm ischemic induced wound from pressors   R groin skin tear 1x1.5cm  Prior PEG insertion site 0.5x1cm   Former trach site 1x0.5cm  Unstageable pressure wound cocyx 7cmx4.5cm w/ 2.5cm depth  R buttock pressure induced wound semi contiguous with coccyx wound calling unstageable given prior debridement 8.5x5.5cm  L buttock 1.0x0.5cm stage 3 pressure injury  R buttock skin tear 3.5cm x 0.5cm and 1.5cmx0.5cm Gen - NAD, Comfortable, obese  HEENT - NCAT, EOMI, MMM  Neck - Supple, No limited ROM  Pulm - CTAB, No wheeze, No rhonchi, No crackles  Cardiovascular - RRR, S1S2, No murmurs  Abdomen - Soft, NT/ND, +BS  Extremities - chronic skin changes consistent with elephantitis, midline present LUE  Neuro-     Cognitive - AAOx3     Communication - Fluent, No dysarthria     Attention: Intact      Judgement: Good evidence of judgement     Motor -                     LEFT    UE - ShAB 5/5, EF 5/5, EE 5/5, WE 5/5,  5/5                    RIGHT UE - ShAB 5/5, EF 5/5, EE 5/5, WE 5/5,  5/5                    LEFT    LE - HF 3/5, KE 2/5 limited by bed size and body habitus, DF 1/5, PF 5/5                    RIGHT LE - HF 3/5, KE 2/5 limited by bed size and body habitus , DF 1/5, PF 5/5        Sensory - Decreased to light touch bilateral lower extremities      Coordination - impaired lower extremities   Psychiatric - Mood stable, Affect WNL    Skin:  R foot plantar aspect lateral/distal side 7cm x 5.5cm ischemic wound from pressors  RLE 4th digit 1x1.5cm ischemic induced wound from pressors  RLE 5th digit 3x2cm ischemic induced wound from pressors  L foot plantar aspect lateral/distal side 6x7cm ischemic induced wound from pressor usage  LLE 4th digit 2x1cm ischemic induced wound from pressors  LLE 5th digit 2.5x3cm ischemic induced wound from pressors   R groin skin tear 1x1.5cm  Prior PEG insertion site 0.5x1cm   Former trach site 1x0.5cm  Unstageable pressure wound cocyx 7cmx4.5cm w/ 2.5cm depth  R buttock pressure induced wound semi contiguous with coccyx wound calling unstageable given prior debridement 8.5x5.5cm  L buttock 1.0x0.5cm stage 3 pressure injury  R buttock skin tear 3.5cm x 0.5cm and 1.5cmx0.5cm

## 2025-04-25 NOTE — PROGRESS NOTE ADULT - SUBJECTIVE AND OBJECTIVE BOX
Infectious Diseases Follow Up:    Patient is a 39y old  Male who presents with a chief complaint of sob (02 Mar 2025 06:19)      Interval History/ROS:  No acute events noted     Allergies  No Known Allergies        ANTIMICROBIALS:  piperacillin/tazobactam IVPB.. 3.375 every 8 hours      Current Abx:     Previous Abx     OTHER MEDS:  MEDICATIONS  (STANDING):  acetaminophen     Tablet .. 650 every 6 hours PRN  albuterol/ipratropium for Nebulization 3 every 6 hours PRN  amLODIPine   Tablet 10 daily  apixaban 5 every 12 hours  bisacodyl 10 at bedtime  cyclobenzaprine 5 three times a day PRN  dextrose 50% Injectable 25 once  dextrose 50% Injectable 12.5 once  dextrose 50% Injectable 25 once  dextrose Oral Gel 15 once PRN  DULoxetine 60 daily  gabapentin 1200 every 8 hours  glucagon  Injectable 1 once  insulin lispro (ADMELOG) corrective regimen sliding scale  three times a day before meals  insulin lispro (ADMELOG) corrective regimen sliding scale  at bedtime  melatonin 3 at bedtime  naloxegol 25 daily  oxyCODONE    IR 5 two times a day PRN  oxyCODONE    IR 15 every 6 hours PRN  pantoprazole    Tablet 40 before breakfast  polyethylene glycol 3350 17 two times a day  QUEtiapine 25 at bedtime      Vital Signs Last 24 Hrs  T(C): 36.6 (25 Apr 2025 08:00), Max: 36.7 (24 Apr 2025 16:32)  T(F): 97.9 (25 Apr 2025 08:00), Max: 98.1 (24 Apr 2025 16:32)  HR: 107 (25 Apr 2025 08:00) (98 - 113)  BP: 123/73 (25 Apr 2025 08:00) (123/73 - 149/94)  BP(mean): --  RR: 19 (25 Apr 2025 08:00) (16 - 22)  SpO2: 99% (25 Apr 2025 08:00) (93% - 100%)    Parameters below as of 25 Apr 2025 08:00  Patient On (Oxygen Delivery Method): room air        PHYSICAL EXAM:  GENERAL: NAD  HEAD:  Atraumatic, Normocephalic  EYES: EOMI, conjunctiva and sclera clear  CHEST/LUNG: On NC, CTAB  HEART: RRR  ABDOMEN: Soft, Nontender, Nondistended  Extremities: B/l feet wrapped   PSYCH: AAOx3                                     8.7    7.72  )-----------( 344      ( 24 Apr 2025 05:28 )             29.1       04-24    139  |  97[L]  |  10  ----------------------------<  100[H]  3.7   |  26  |  1.26    Ca    10.0      24 Apr 2025 05:28  Phos  5.0     04-24  Mg     1.80     04-24        Urinalysis Basic - ( 24 Apr 2025 05:28 )    Color: x / Appearance: x / SG: x / pH: x  Gluc: 100 mg/dL / Ketone: x  / Bili: x / Urobili: x   Blood: x / Protein: x / Nitrite: x   Leuk Esterase: x / RBC: x / WBC x   Sq Epi: x / Non Sq Epi: x / Bacteria: x        MICROBIOLOGY:  v  Tissue SACRAL WOUND AEBNDEMENT  04-15-25   Few Providencia stuartii  Rare Bacteroides vulgatus group "Susceptibilities not performed"  --  Providencia stuartii      Blood Blood-Venous  04-07-25   No growth at 5 days  --  --      Blood Blood-Peripheral  04-07-25   No growth at 5 days  --  --      Clean Catch Clean Catch (Midstream)  03-31-25   <10,000 CFU/mL Normal Urogenital Keysha  --  --      Blood Blood-Peripheral  03-30-25   No growth at 5 days  --  --                RADIOLOGY:

## 2025-04-25 NOTE — DISCHARGE NOTE NURSING/CASE MANAGEMENT/SOCIAL WORK - NSDCFUADDAPPT_GEN_ALL_CORE_FT
Podiatry Discharge Instructions:  Follow up: Please follow up with Dr. Waterhouse within 1 week of discharge from the hospital, please call [phone] for appointment and discuss that you recently were seen in the hospital.  Wound Care: Please apply betadine to bilateral foot wounds followed by 4x4 gauze, ABD pad, and matilde daily  Weight bearing: Please weight bear as tolerated in a surgical shoe.  Antibiotics: Please continue as instructed.

## 2025-04-25 NOTE — PATIENT PROFILE ADULT - FALL HARM RISK - RISK INTERVENTIONS

## 2025-04-25 NOTE — DISCHARGE NOTE NURSING/CASE MANAGEMENT/SOCIAL WORK - NSDCDMETYPESERV_GEN_ALL_CORE_FT
A HOME BiPaP machine was approved by your Insurance Company. A Representative from [facility] [phone]  will F/U with your Discharge date from [facility] to set up the machine once you are discharged home.

## 2025-04-25 NOTE — DISCHARGE NOTE NURSING/CASE MANAGEMENT/SOCIAL WORK - PATIENT PORTAL LINK FT
You can access the FollowMyHealth Patient Portal offered by [facility] by registering at the following website: [email or web address]. By joining [employer]’s FollowMyHealth portal, you will also be able to view your health information using other applications (apps) compatible with our system.

## 2025-04-25 NOTE — PROGRESS NOTE ADULT - ASSESSMENT
This is a 40 y/o M w/ PMHx AF (not on AC), HTN, BEA, pseudotumor cerebri s/p LP in 2024, initially presented to [place] on 2/24, SOB, LE edema with URI symptoms and sick contacts, noted to have severe ARDS, intubated however still hypoxia, cannulated for VV ECMO on 2/25, transferred to [facility] on 2/25, started on empiric Vanco, Zosyn for multifocal PNA, abdominal wall cellulitis, s/p trach placement by CT surgery on 3/7, ECMO decannulated on 3/7. Zosyn held on 3/9.  C/b fevers on 3/10, restarted on Zosyn, transferred to [facility] on 3/13, Bcx now w/ yeast.    #Sacral ulcer, with extensive tunneling, no OM on MRI, however c/f OM on WBC scan  #Candida albicans fungemia   #Fevers   #Multifocal PNA, abdominal wall cellulitis s/p Vanco/Zosyn  #ARDS, hypoxic respiratory failure requiring VV EMCO 2/2 multifocal PNA? s/p decannulation on 3/7    Overall, 40 y/o M w/ PMHx AF (not on AC), HTN, BEA, pseudotumor cerebri s/p LP in 2024 admitted to [facility] on 2/26 for ARDS, hypoxic respiratory failure requiring VV EMCO 2/2 multifocal PNA? s/p decannulation on 3/7, c/b abdominal wall cellulitis s/p Vancomycin/Zosyn, s/p trach on 3/7, in the setting of persistent fevers despite Zosyn, BCx now w/ yeast, Candida albicans   Unclear source for yeast in BCx, pt had all central lines removed on 3/7, not getting TPN, no abdominal pain on exam, PEG site well appearing.     CT A/P w/o clear abdominal source of fungemia, possibly 2/2 abdominal wall cellulitis? TTE w/o IE.  BCx 3/18 1/2 positive, 3/20 NGTD x 2.     Pt with worsening sepsis on 3/30, febrile to 103, WBC 16.   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   Pt with deep tunneled wound to left, no drainage, malodor per wound care.   CT A/P w/ b/l pulmonary opacities but improved, no sacral abscess   WBC scan with soft tissue infection R side of pelvis, intense activity in R iliac bone possible OM. Lateral R forefoot with soft tissue infection, possible OM?  Wound examined with wound care, large R buttocks soft tissue ulcer, does not look infected, however deep tunneling with murky fluid, likely source of infection   MRI w/o OM, however given WBC scan findings, will still treat for 6 weeks     S/p PEG removal and sacral ulcer debridement, gram stain w/ providenciae, bacteroides     Recommendations;   1. Continue with Zosyn 3.375 g 8, plan on 6 week course for possible OM until 5/11.   2. S/p Caspofungin 70 mg daily, 4 week course ended 4/15    Thank you for consulting us and involving us in the management of this patient's case. In addition to reviewing history, imaging, documents, labs, microbiology, and infection control strategies and potential issues.     Inpatient ID consult team will sign off.    Further changes in lab values, imaging studies, or clinical status will not be known to ID inpatient consultants unless specifically communicated by primary team.    Shailesh Owens MD  Attending Physician  Division of Infectious Diseases  Department of Medicine    Please contact through MS Teams message.  Office: [phone] (after 5 PM or weekend)

## 2025-04-25 NOTE — PROGRESS NOTE ADULT - SUBJECTIVE AND OBJECTIVE BOX
Patient is a 39y old  Male who presents with a chief complaint of sob (02 Mar 2025 06:19)    Interval history: tolerated bedside therapy yesterday    REVIEW OF SYSTEMS   weakness  pain (controlled)    PAST MEDICAL & SURGICAL HISTORY  HTN (hypertension)    Atrial fibrillation    Papilledema of both eyes    Chronic sinusitis    Morbid obesity    No significant past surgical history         CURRENT FUNCTIONAL STATUS  4/24  Bed Mobility  Bed Mobility Training Rehab Potential: good, to achieve stated therapy goals  Bed Mobility Training Sit-to-Supine: minimum assist (75% patient effort);  1 person assist;  nonverbal cues (demo/gestures);  verbal cues;  bed rails  Bed Mobility Training Supine-to-Sit: minimum assist (75% patient effort);  1 person assist;  nonverbal cues (demo/gestures);  verbal cues;  bed rails  Bed Mobility Training Limitations: decreased strength;  impaired balance;  decreased ability to use legs for bridging/pushing    Sit-Stand Transfer Training  Sit-to-Stand Transfer Training Rehab Potential: good, to achieve stated therapy goals  Transfer Training Sit-to-Stand Transfer: maximum assist (25% patient effort);  2 person assist;  nonverbal cues (demo/gestures);  verbal cues;  weight-bearing as tolerated   with Dhara sit to stand transfer aid, pt. unable to achieve full stand secondary to weakness   Transfer Training Stand-to-Sit Transfer: maximum assist (25% patient effort);  2 person assist;  nonverbal cues (demo/gestures);  verbal cues;  weight-bearing as tolerated   with Dhara sit to stand transfer aid  Sit-to-Stand Transfer Training Transfer Safety Analysis: decreased strength;  impaired balance    Therapeutic Exercise  Therapeutic Exercise Rehab Effort: good  Therapeutic Exercise Detail: pt. performed active/active assistive ROM bilateral LE in various planes         RECENT LABS/IMAGING  CBC Full  -  ( 24 Apr 2025 05:28 )  WBC Count : 7.72 K/uL  RBC Count : 3.28 M/uL  Hemoglobin : 8.7 g/dL  Hematocrit : 29.1 %  Platelet Count - Automated : 344 K/uL  Mean Cell Volume : 88.7 fL  Mean Cell Hemoglobin : 26.5 pg  Mean Cell Hemoglobin Concentration : 29.9 g/dL  Auto Neutrophil # : x  Auto Lymphocyte # : x  Auto Monocyte # : x  Auto Eosinophil # : x  Auto Basophil # : x  Auto Neutrophil % : x  Auto Lymphocyte % : x  Auto Monocyte % : x  Auto Eosinophil % : x  Auto Basophil % : x    04-24    139  |  97[L]  |  10  ----------------------------<  100[H]  3.7   |  26  |  1.26    Ca    10.0      24 Apr 2025 05:28  Phos  5.0     04-24  Mg     1.80     04-24      Urinalysis Basic - ( 24 Apr 2025 05:28 )    Color: x / Appearance: x / SG: x / pH: x  Gluc: 100 mg/dL / Ketone: x  / Bili: x / Urobili: x   Blood: x / Protein: x / Nitrite: x   Leuk Esterase: x / RBC: x / WBC x   Sq Epi: x / Non Sq Epi: x / Bacteria: x        VITALS  T(C): 36.6 (04-25-25 @ 08:00), Max: 36.7 (04-24-25 @ 16:32)  HR: 104 (04-25-25 @ 11:28) (98 - 113)  BP: 123/73 (04-25-25 @ 08:00) (123/73 - 149/94)  RR: 19 (04-25-25 @ 08:00) (16 - 22)  SpO2: 94% (04-25-25 @ 11:28) (93% - 100%)  Wt(kg): --    ALLERGIES  No Known Allergies      MEDICATIONS   acetaminophen     Tablet .. 650 milliGRAM(s) Oral every 6 hours PRN  albuterol/ipratropium for Nebulization 3 milliLiter(s) Nebulizer every 6 hours PRN  amLODIPine   Tablet 10 milliGRAM(s) Oral daily  apixaban 5 milliGRAM(s) Oral every 12 hours  bisacodyl 10 milliGRAM(s) Oral at bedtime  capsaicin HP 0.075% Cream 1 Application(s) Topical four times a day  chlorhexidine 2% Cloths 1 Application(s) Topical <User Schedule>  clotrimazole 1% Cream 1 Application(s) Topical two times a day  cyclobenzaprine 5 milliGRAM(s) Oral three times a day PRN  Dakins Solution - 1/4 Strength 1 Application(s) Topical two times a day  dextrose 50% Injectable 25 Gram(s) IV Push once  dextrose 50% Injectable 12.5 Gram(s) IV Push once  dextrose 50% Injectable 25 Gram(s) IV Push once  dextrose Oral Gel 15 Gram(s) Oral once PRN  DULoxetine 60 milliGRAM(s) Oral daily  gabapentin 1200 milliGRAM(s) Oral every 8 hours  glucagon  Injectable 1 milliGRAM(s) IntraMuscular once  insulin lispro (ADMELOG) corrective regimen sliding scale   SubCutaneous three times a day before meals  insulin lispro (ADMELOG) corrective regimen sliding scale   SubCutaneous at bedtime  lidocaine   4% Patch 1 Patch Transdermal daily  lidocaine   4% Patch 1 Patch Transdermal daily  melatonin 3 milliGRAM(s) Oral at bedtime  multivitamin 1 Tablet(s) Oral daily  mupirocin 2% Ointment 1 Application(s) Topical two times a day  naloxegol 25 milliGRAM(s) Oral daily  oxyCODONE    IR 5 milliGRAM(s) Oral two times a day PRN  oxyCODONE    IR 15 milliGRAM(s) Oral every 6 hours PRN  pantoprazole    Tablet 40 milliGRAM(s) Oral before breakfast  piperacillin/tazobactam IVPB.. 3.375 Gram(s) IV Intermittent every 8 hours  polyethylene glycol 3350 17 Gram(s) Oral two times a day  QUEtiapine 25 milliGRAM(s) Oral at bedtime  sevelamer carbonate 800 milliGRAM(s) Oral three times a day with meals      ----------------------------------------------------------------------------------------   PHYSICAL EXAM  Constitutional - NAD   Chest - no respiratory distress  Cardiovascular -mild tachy  Extremities - dressings b/l feet  Neurologic Exam -                    Cognitive - Awake, Alert, AAO to self, place, date, year, situation      Communication - fluent     Cranial Nerves - CN 2-12 intact     Motor -                     LEFT    UE - 4+/5                    RIGHT UE - 4+/5                    LEFT    LE -  2/5                      RIGHT LE -  2/4     Sensory - Intact to LT       Balance - WNL Static  Psychiatric - Mood stable, Affect WNL  ----------------------------------------------------------------------------------------  ASSESSMENT/PLAN  38 y/o M w/ PMHx AF (not on AC), HTN, BEA, pseudotumor cerebri s/p LP in 2024, initially presented to [place] on 2/24, SOB, LE edema with URI symptoms and sick contacts, noted to have severe ARDS, intubated however still hypoxia, cannulated for VV ECMO on 2/25, transferred to [facility] on 2/25, started on empiric Vanco, Zosyn for multifocal PNA, abdominal wall cellulitis  with critical illness neuromyopathy  OM on wbc scan  s/p trach and peg 3/7, since decannulated   continue bedside PT and OT     Pain - acetaminophen, oxy ir prn, gabapentin   DVT PPX - on eliquis  Rehab -   recommend acute inpatient rehab when medically cleared. Patient can tolerate 3 hours per day of therapy with  medical supervision.    Total time spent to review relevant records and imaging results, examine patient, complete documentation, and when applicable discuss the case with the patient, family, , social workers, and medical team: 35  minutes

## 2025-04-25 NOTE — DISCHARGE NOTE NURSING/CASE MANAGEMENT/SOCIAL WORK - FINANCIAL ASSISTANCE
[facility] provides services at a reduced cost to those who are determined to be eligible through [facility]’s financial assistance program. Information regarding [facility]’s financial assistance program can be found by going to [email or web address] or by calling [phone].

## 2025-04-25 NOTE — PROGRESS NOTE ADULT - TIME-BASED BILLING (NON-CRITICAL CARE)
Time-based billing (NON-critical care)

## 2025-04-25 NOTE — PROGRESS NOTE ADULT - NS ATTEND AMEND GEN_ALL_CORE FT
40 yo M w/ class III obesity, pAfib (no AC), HTN, BEA (on CPAP), history of b/l papilledema attributed to pseudotumor cerebri, who is transferred from [place] on 2/26 for VV ECMO 2/2 ARDS.     Patient transferred to [facility] for VV ECMO. S/P diiuresis, TTE/ROBERT with RV failure, s/p treatment of pneumonia. Patient s/p trach and peg. Course complicated by Fungemia s/p course of caspo, also with osteomyelitis s/p surgical debridement. Had critical illness polyneuropathy due to critical illness. With now neuropathic pain. Now decannulated trach and on PO diet.     Doing better. Standing. Pain is better controlled. Had BM today.     # acute hypoxemic respiratory failure- resolved.  # RV failure  # oropharyngeal dysphagia- resolved.   # Critical illness neuropathy  # afib  # DVT  # Osteomyelitis  # Fungemia  - ARDS s/p VV ecmo. Trach decannualted. On room air.   - OOB, ambulate as tolerated, aggressive PT  - Passes s/s eval, now on PO diet.  - S/p course of antifungals. Pending continued zosyn for osteo s/p OR. C/W wound care. Last day of zosyn 5/11.   - DVT noted on repeat duplex. C/W full ac   - C/W full a/c for now. Hx of afib, [id number] of 2  - Pain 2/2 to likely critical illness neuropathy. On gabapentin, cymbalta, flexiril. Pain consulted but given neuropathic less role for opioids   - C/W PO oxycodone, bowel regiment. No longer requiring IV pain medications. Had BM today.   - DVT ppx- Full A/C  - Dispo- full code. Aggressive PT eval. OOB to chair. Hopefully d/c to acute rehab.

## 2025-04-25 NOTE — H&P ADULT - NSHPSOCIALHISTORY_GEN_ALL_CORE
SOCIAL HISTORY  Smoking - Current every day smoker   EtOH - Denied   Drugs - Denied    FUNCTIONAL HISTORY  Per chart review, patient lives in a house with family and was independent with ADLs and IADLs.    CURRENT FUNCTIONAL STATUS  - Bed Mobility: Min A  - Transfers: Max A

## 2025-04-26 LAB
ALBUMIN SERPL ELPH-MCNC: 2.3 G/DL — LOW (ref 3.3–5)
ALP SERPL-CCNC: 56 U/L — SIGNIFICANT CHANGE UP (ref 40–120)
ALT FLD-CCNC: 27 U/L — SIGNIFICANT CHANGE UP (ref 10–45)
ANION GAP SERPL CALC-SCNC: 8 MMOL/L — SIGNIFICANT CHANGE UP (ref 5–17)
ANISOCYTOSIS BLD QL: SLIGHT — SIGNIFICANT CHANGE UP
AST SERPL-CCNC: 20 U/L — SIGNIFICANT CHANGE UP (ref 10–40)
BASOPHILS # BLD AUTO: 0.02 K/UL — SIGNIFICANT CHANGE UP (ref 0–0.2)
BASOPHILS NFR BLD AUTO: 0.4 % — SIGNIFICANT CHANGE UP (ref 0–2)
BILIRUB SERPL-MCNC: 0.4 MG/DL — SIGNIFICANT CHANGE UP (ref 0.2–1.2)
BUN SERPL-MCNC: 8 MG/DL — SIGNIFICANT CHANGE UP (ref 7–23)
CALCIUM SERPL-MCNC: 10 MG/DL — SIGNIFICANT CHANGE UP (ref 8.4–10.5)
CHLORIDE SERPL-SCNC: 100 MMOL/L — SIGNIFICANT CHANGE UP (ref 96–108)
CO2 SERPL-SCNC: 30 MMOL/L — SIGNIFICANT CHANGE UP (ref 22–31)
CREAT SERPL-MCNC: 1.22 MG/DL — SIGNIFICANT CHANGE UP (ref 0.5–1.3)
EGFR: 79 ML/MIN/1.73M2 — SIGNIFICANT CHANGE UP
EGFR: 79 ML/MIN/1.73M2 — SIGNIFICANT CHANGE UP
EOSINOPHIL # BLD AUTO: 0.22 K/UL — SIGNIFICANT CHANGE UP (ref 0–0.5)
EOSINOPHIL NFR BLD AUTO: 4.3 % — SIGNIFICANT CHANGE UP (ref 0–6)
GLUCOSE BLDC GLUCOMTR-MCNC: 101 MG/DL — HIGH (ref 70–99)
GLUCOSE BLDC GLUCOMTR-MCNC: 99 MG/DL — SIGNIFICANT CHANGE UP (ref 70–99)
GLUCOSE SERPL-MCNC: 95 MG/DL — SIGNIFICANT CHANGE UP (ref 70–99)
HCT VFR BLD CALC: 29 % — LOW (ref 39–50)
HGB BLD-MCNC: 8.3 G/DL — LOW (ref 13–17)
HYPOCHROMIA BLD QL: SLIGHT — SIGNIFICANT CHANGE UP
IMM GRANULOCYTES NFR BLD AUTO: 0.2 % — SIGNIFICANT CHANGE UP (ref 0–0.9)
LYMPHOCYTES # BLD AUTO: 1.54 K/UL — SIGNIFICANT CHANGE UP (ref 1–3.3)
LYMPHOCYTES # BLD AUTO: 29.8 % — SIGNIFICANT CHANGE UP (ref 13–44)
MANUAL SMEAR VERIFICATION: SIGNIFICANT CHANGE UP
MCHC RBC-ENTMCNC: 24.8 PG — LOW (ref 27–34)
MCHC RBC-ENTMCNC: 28.6 G/DL — LOW (ref 32–36)
MCV RBC AUTO: 86.6 FL — SIGNIFICANT CHANGE UP (ref 80–100)
MONOCYTES # BLD AUTO: 0.67 K/UL — SIGNIFICANT CHANGE UP (ref 0–0.9)
MONOCYTES NFR BLD AUTO: 13 % — SIGNIFICANT CHANGE UP (ref 2–14)
NEUTROPHILS # BLD AUTO: 2.7 K/UL — SIGNIFICANT CHANGE UP (ref 1.8–7.4)
NEUTROPHILS NFR BLD AUTO: 52.3 % — SIGNIFICANT CHANGE UP (ref 43–77)
NRBC BLD AUTO-RTO: 0 /100 WBCS — SIGNIFICANT CHANGE UP (ref 0–0)
PLAT MORPH BLD: NORMAL — SIGNIFICANT CHANGE UP
PLATELET # BLD AUTO: 366 K/UL — SIGNIFICANT CHANGE UP (ref 150–400)
POTASSIUM SERPL-MCNC: 3.3 MMOL/L — LOW (ref 3.5–5.3)
POTASSIUM SERPL-SCNC: 3.3 MMOL/L — LOW (ref 3.5–5.3)
PROT SERPL-MCNC: 7.8 G/DL — SIGNIFICANT CHANGE UP (ref 6–8.3)
RBC # BLD: 3.35 M/UL — LOW (ref 4.2–5.8)
RBC # FLD: 21 % — HIGH (ref 10.3–14.5)
RBC BLD AUTO: ABNORMAL
SODIUM SERPL-SCNC: 138 MMOL/L — SIGNIFICANT CHANGE UP (ref 135–145)
WBC # BLD: 5.16 K/UL — SIGNIFICANT CHANGE UP (ref 3.8–10.5)
WBC # FLD AUTO: 5.16 K/UL — SIGNIFICANT CHANGE UP (ref 3.8–10.5)

## 2025-04-26 PROCEDURE — 99223 1ST HOSP IP/OBS HIGH 75: CPT

## 2025-04-26 PROCEDURE — 99255 IP/OBS CONSLTJ NEW/EST HI 80: CPT

## 2025-04-26 RX ADMIN — Medication 10 MILLIGRAM(S): at 22:31

## 2025-04-26 RX ADMIN — CLOTRIMAZOLE 1 APPLICATION(S): 1 CREAM TOPICAL at 14:47

## 2025-04-26 RX ADMIN — NALOXEGOL OXALATE 25 MILLIGRAM(S): 12.5 TABLET, FILM COATED ORAL at 13:38

## 2025-04-26 RX ADMIN — SEVELAMER HYDROCHLORIDE 800 MILLIGRAM(S): 800 TABLET ORAL at 17:20

## 2025-04-26 RX ADMIN — Medication 1 APPLICATION(S): at 22:33

## 2025-04-26 RX ADMIN — Medication 25 GRAM(S): at 14:41

## 2025-04-26 RX ADMIN — OXYCODONE HYDROCHLORIDE 15 MILLIGRAM(S): 30 TABLET ORAL at 14:56

## 2025-04-26 RX ADMIN — OXYCODONE HYDROCHLORIDE 15 MILLIGRAM(S): 30 TABLET ORAL at 22:30

## 2025-04-26 RX ADMIN — Medication 25 GRAM(S): at 06:32

## 2025-04-26 RX ADMIN — CYCLOBENZAPRINE HYDROCHLORIDE 5 MILLIGRAM(S): 15 CAPSULE, EXTENDED RELEASE ORAL at 23:12

## 2025-04-26 RX ADMIN — APIXABAN 5 MILLIGRAM(S): 2.5 TABLET, FILM COATED ORAL at 06:56

## 2025-04-26 RX ADMIN — SODIUM HYPOCHLORITE 1 APPLICATION(S): 0.12 SOLUTION TOPICAL at 06:56

## 2025-04-26 RX ADMIN — Medication 25 GRAM(S): at 22:32

## 2025-04-26 RX ADMIN — Medication 3 MILLIGRAM(S): at 22:31

## 2025-04-26 RX ADMIN — GABAPENTIN 1200 MILLIGRAM(S): 400 CAPSULE ORAL at 13:36

## 2025-04-26 RX ADMIN — AMLODIPINE BESYLATE 10 MILLIGRAM(S): 10 TABLET ORAL at 06:56

## 2025-04-26 RX ADMIN — MUPIROCIN CALCIUM 1 APPLICATION(S): 20 CREAM TOPICAL at 06:58

## 2025-04-26 RX ADMIN — Medication 40 MILLIEQUIVALENT(S): at 17:24

## 2025-04-26 RX ADMIN — SEVELAMER HYDROCHLORIDE 800 MILLIGRAM(S): 800 TABLET ORAL at 13:38

## 2025-04-26 RX ADMIN — OXYCODONE HYDROCHLORIDE 15 MILLIGRAM(S): 30 TABLET ORAL at 15:56

## 2025-04-26 RX ADMIN — GABAPENTIN 1200 MILLIGRAM(S): 400 CAPSULE ORAL at 06:50

## 2025-04-26 RX ADMIN — OXYCODONE HYDROCHLORIDE 15 MILLIGRAM(S): 30 TABLET ORAL at 05:55

## 2025-04-26 RX ADMIN — APIXABAN 5 MILLIGRAM(S): 2.5 TABLET, FILM COATED ORAL at 17:20

## 2025-04-26 RX ADMIN — Medication 40 MILLIGRAM(S): at 06:59

## 2025-04-26 RX ADMIN — SODIUM HYPOCHLORITE 1 APPLICATION(S): 0.12 SOLUTION TOPICAL at 22:33

## 2025-04-26 RX ADMIN — Medication 1 TABLET(S): at 13:39

## 2025-04-26 RX ADMIN — DULOXETINE 60 MILLIGRAM(S): 20 CAPSULE, DELAYED RELEASE ORAL at 13:38

## 2025-04-26 RX ADMIN — SEVELAMER HYDROCHLORIDE 800 MILLIGRAM(S): 800 TABLET ORAL at 09:01

## 2025-04-26 RX ADMIN — GABAPENTIN 1200 MILLIGRAM(S): 400 CAPSULE ORAL at 22:31

## 2025-04-26 RX ADMIN — QUETIAPINE FUMARATE 25 MILLIGRAM(S): 25 TABLET ORAL at 22:31

## 2025-04-26 NOTE — DIETITIAN INITIAL EVALUATION ADULT - ETIOLOGY
related to increased physiological demand for wound healing  related to excessive energy intake relative to expenditure

## 2025-04-26 NOTE — DIETITIAN INITIAL EVALUATION ADULT - NUTRITIONGOAL OUTCOME1
Goal: Pt will consume >75% of most meals/supplements throughout rehab course to maintain current weight or gradually trend down towards IBW

## 2025-04-26 NOTE — DIETITIAN INITIAL EVALUATION ADULT - PERTINENT LABORATORY DATA
04-26    138  |  100  |  8   ----------------------------<  95  3.3[L]   |  30  |  1.22    Ca    10.0      26 Apr 2025 06:34    TPro  7.8  /  Alb  2.3[L]  /  TBili  0.4  /  DBili  x   /  AST  20  /  ALT  27  /  AlkPhos  56  04-26  POCT Blood Glucose.: 101 mg/dL (04-26-25 @ 08:15)  A1C with Estimated Average Glucose Result: 6.7 % (02-25-25 @ 05:44)

## 2025-04-26 NOTE — CONSULT NOTE ADULT - ASSESSMENT
39 year old male with PMH of morbid obesity, BEA on CPAP, pAFIB (not on AC), HTN, and BL papilledema attributed to pseudotumor cerebri; who initially presented to  on 2/24 with SOB and BL LE edema. Found to have URI with progressive SOB and multifocal PNA on imaging. His hospital course was complicated by worsening respiratory distress and increased 02 demands secondary to secretions (requiring multiple intubation attempts) and eventual MICU admission. While in MICU, he 02 requirements continued despite optimal vent management. Concern noted for progressive ARDS, unable to prone given morbid obesity, and ultimately cannulated for vvECMO on 2/25 and transferred to [facility] for further management on 2/26. His hospital course at [facility] was significant for the following: diuretic and ABX management, s/p trach and PEG (placed on 3/7- PEG since removed), RCU admission, high grade fevers (secondary to panniculitis), progressively worsening sacral ulcer (likely osteomyelitis- s/p surgical debridement), BL foot wound (secondary to pressor use), neuropathy (secondary to critical illness polyneuropathy), ELLA (secondary to vancomycin toxicity and overdiuresis- since DCd- with improvement). Patient now admitted for a multidisciplinary rehab program. 04-25-25 @ 15:19    #Debility Secondary to Acute Hypoxic Respiratory Failure/ARDS 2/2 PNA  #Critical Illness Polyneuropathy   -s/p VV ECMO, s/p diuresis, TTE/ROBERT with RV failure, s/p treatment for pneumonia  -Echo 3/11 showed EF 66 %. RV is not well visualized, enlarged RV cavity size a/ reduced RV systolic function. No significant valvular disease.  No echocardiographic evidence of pulmonary hypertension.  -s/p Trach (decannulated 3/28) and PEG (removed 4/15)   -  - Gait Instability, ADL impairments and Functional impairments: start Comprehensive Rehab Program of PT/OT  - 3 hours a day, 5 days a week  - P&O as needed   - BiPap at HS   - PRN: Nebulizer QID     #Sacral Osteomyelitis  #Unstageable Sacral Ulcer s/p Debridement   - Zosyn TID - last dose evening of 5/11/25    #Fungemia 2/2 Panniculitis  -Blood culture 3/15 and 3/16 with candida albicans  -s/p course of antifungals   -Repeat culture snegative     #Paroxysmal AFIB  - Eliquis 5mg BID     #DVT      #HTN  - Amlodipine 10mg dialy     #Sleep/Mood  - Melatonin 3mg at HS   - Quetiapine 25mg at HS     #Skin  - Skin on admission  - LUQ surgical wound previous site of PEG tube and right groin- Clean wound and periwound skin with NS. Pat dry. Cover with small silicone foam with border. Change every other day or prn if soiled     Sacral/gluteal cleft to bilateral buttocks- Irrigate deep cavity and tunneling with Dakins solution 1/4 strength using peribottle or flushes, cleanse wound and satellite ulcerations to b/l buttocks with NS, Dry well. Apply Liquid barrier film to periwound skin. Apply TRIAD barrier paste to isolated ulcer overlying left buttock,  Apply Aquacel hydrofiber sheet to right buttock, pack sacral/gluteal cleft including deep tunnels with Dakins 1/4 strength moistened kerlix, leave at least 2" out at end to ensure full removal of packing with subsequent dressing changes. Cover entire area (sacrum and right buttock) with abdominal pad and tegaderm. Change twice a day and prn if soiled/compromised. Once dressing is in place and perineal care is provided, clean remaining skin with perineal spray, apply TRIAD moisture barrier cream to exposed skin every shift and prn.    Bilateral abdominal pannus, bilateral groin: Cleanse with luke-warm soap and water, dry well. Apply clotrimazole cream daily, followed by Interdry textile sheeting, under intertriginous folds leaving 2 inches exposed at ends to wick, remove to wash & dry affected area, then replace. Individual sheeting may be used for up to 5 days unless soiled. Inspect skin daily.     - L foot wound- betadine to followed by 4x4 gauze, ABD pad, and matilde daily  - R foot wound- mupirocin BID     -Continue to offload pressure; bariatric low airloss support surface, turn and position per protocol with use of fluidized positioning devices, continue incontinence and moisture care per protocol and use of single breathable incontinence pads, continue to offload heels with fluidized positioning devices/Ochoa-Lock positioning device (PS# [id number]). Continue use of bariatric seat cushion (people soft #[id number]) and limit sit time- no more than 2 consecutive hours at any given time.   - Pressure injury/Skin: OOB to Chair, PT/OT   -consulting plastic surgery    #Neuropathy  - Cymbalta 60mg daily   - Gabapentin 1200mg TID     #Pain Mgmt   - Capsaicin cream to BL feet QID - Avoid use on damaged, broken, or irritated skin. Avoid occlusive dressing or heat   - Lidocaine patch to BL thighs   - PRN: Cyclobenzaprine 5mg TID; Oxycodone 5mg BID; Oxycodone 15mg daily (prior to dressing change); Tylenol 650mg QID     #GI/Bowel Mgmt   - Pantoprazole  - Bisacodyl 10mg at HS   - Miralax   - Naloxegal 25mg daily     #/Bladder Mgmt   #ELLA  - Renvela 800mg TID  -  PVR q8h, CIC>400cc    #FEN   - Diet - Regular + Thins  [CCHO]    - MVI     #Precautions / PROPHYLAXIS:   - Falls  - ortho: Weight bearing status: WBAT in surgical shoe   - Lungs: Aspiration, Incentive Spirometer   - DVT: Lovenox  ----------------------------------------------   39 year old male with PMH of morbid obesity, BEA on CPAP, pAFIB (not on AC), HTN, and BL papilledema attributed to pseudotumor cerebri; who initially presented to  on 2/24 with SOB and BL LE edema. Found to have URI with progressive SOB and multifocal PNA on imaging. His hospital course was complicated by worsening respiratory distress and increased 02 demands secondary to secretions (requiring multiple intubation attempts) and eventual MICU admission. While in MICU, he 02 requirements continued despite optimal vent management. Concern noted for progressive ARDS, unable to prone given morbid obesity, and ultimately cannulated for vvECMO on 2/25 and transferred to [place] for further management on 2/26. His hospital course at [facility] was significant for the following: diuretic and ABX management, s/p trach and PEG (placed on 3/7- PEG since removed), RCU admission, high grade fevers (secondary to panniculitis), progressively worsening sacral ulcer (likely osteomyelitis- s/p surgical debridement), BL foot wound (secondary to pressor use), neuropathy (secondary to critical illness polyneuropathy), ELLA (secondary to vancomycin toxicity and overdiuresis- since DCd- with improvement). Patient now admitted for a multidisciplinary rehab program. 04-25-25 @ 15:19    #Debility Secondary to Acute Hypoxic Respiratory Failure/ARDS 2/2 PNA  #BEA   #Critical Illness Polyneuropathy   -s/p VV ECMO, s/p diuresis, TTE/ROBERT with RV failure, s/p treatment for pneumonia  -Repeat Echo 3/11 showed EF 66 %. RV is not well visualized, enlarged RV cavity size a/ reduced RV systolic function. No significant valvular disease.  No echocardiographic evidence of pulmonary hypertension.  -s/p Trach (decannulated 3/28) and PEG (removed 4/15)   -Saturating well on RA  -Continue duoneb PRN   -  -Fall precaution  -Pain control and bowel regimen per reahab team  -Comprehensive rehab program with PT/OT/SLP    - Gait Instability, ADL impairments and Functional impairments: start Comprehensive Rehab Program of PT/OT  - 3 hours a day, 5 days a week  - P&O as needed   - BiPap at HS   - PRN: Nebulizer QID     #Sacral Osteomyelitis  #Unstageable Sacral Ulcer s/p Debridement   - Zosyn TID - last dose evening of 5/11/25    #Fungemia 2/2 Panniculitis  -Blood culture 3/15 and 3/16 with candida albicans  -s/p course of antifungals   -Repeat culture snegative     #Paroxysmal AFIB  - Eliquis 5mg BID     #DVT      #HTN  - Amlodipine 10mg dialy     #Sleep/Mood  - Melatonin 3mg at HS   - Quetiapine 25mg at HS     #Skin  - Skin on admission  - LUQ surgical wound previous site of PEG tube and right groin- Clean wound and periwound skin with NS. Pat dry. Cover with small silicone foam with border. Change every other day or prn if soiled     Sacral/gluteal cleft to bilateral buttocks- Irrigate deep cavity and tunneling with Dakins solution 1/4 strength using peribottle or flushes, cleanse wound and satellite ulcerations to b/l buttocks with NS, Dry well. Apply Liquid barrier film to periwound skin. Apply TRIAD barrier paste to isolated ulcer overlying left buttock,  Apply Aquacel hydrofiber sheet to right buttock, pack sacral/gluteal cleft including deep tunnels with Dakins 1/4 strength moistened kerlix, leave at least 2" out at end to ensure full removal of packing with subsequent dressing changes. Cover entire area (sacrum and right buttock) with abdominal pad and tegaderm. Change twice a day and prn if soiled/compromised. Once dressing is in place and perineal care is provided, clean remaining skin with perineal spray, apply TRIAD moisture barrier cream to exposed skin every shift and prn.    Bilateral abdominal pannus, bilateral groin: Cleanse with luke-warm soap and water, dry well. Apply clotrimazole cream daily, followed by Interdry textile sheeting, under intertriginous folds leaving 2 inches exposed at ends to wick, remove to wash & dry affected area, then replace. Individual sheeting may be used for up to 5 days unless soiled. Inspect skin daily.     - L foot wound- betadine to followed by 4x4 gauze, ABD pad, and matilde daily  - R foot wound- mupirocin BID     -Continue to offload pressure; bariatric low airloss support surface, turn and position per protocol with use of fluidized positioning devices, continue incontinence and moisture care per protocol and use of single breathable incontinence pads, continue to offload heels with fluidized positioning devices/Ochoa-Lock positioning device (PS# [id number]). Continue use of bariatric seat cushion (people soft #[id number]) and limit sit time- no more than 2 consecutive hours at any given time.   - Pressure injury/Skin: OOB to Chair, PT/OT   -consulting plastic surgery    #Neuropathy  - Cymbalta 60mg daily   - Gabapentin 1200mg TID     #Pain Mgmt   - Capsaicin cream to BL feet QID - Avoid use on damaged, broken, or irritated skin. Avoid occlusive dressing or heat   - Lidocaine patch to BL thighs   - PRN: Cyclobenzaprine 5mg TID; Oxycodone 5mg BID; Oxycodone 15mg daily (prior to dressing change); Tylenol 650mg QID     #GI/Bowel Mgmt   - Pantoprazole  - Bisacodyl 10mg at HS   - Miralax   - Naloxegal 25mg daily     #/Bladder Mgmt   #ELLA  - Renvela 800mg TID  -  PVR q8h, CIC>400cc    #FEN   - Diet - Regular + Thins  [CCHO]    - MVI     #Precautions / PROPHYLAXIS:   - Falls  - ortho: Weight bearing status: WBAT in surgical shoe   - Lungs: Aspiration, Incentive Spirometer   - DVT: Lovenox  ----------------------------------------------   39 year old male with PMH of morbid obesity, BEA on CPAP, pAFIB (not on AC), HTN, and BL papilledema attributed to pseudotumor cerebri; who initially presented to  on 2/24 with SOB and BL LE edema. Found to have URI with progressive SOB and multifocal PNA on imaging. His hospital course was complicated by worsening respiratory distress and increased 02 demands secondary to secretions (requiring multiple intubation attempts) and eventual MICU admission. While in MICU, he 02 requirements continued despite optimal vent management. Concern noted for progressive ARDS, unable to prone given morbid obesity, and ultimately cannulated for vvECMO on 2/25 and transferred to [facility] for further management on 2/26. His hospital course at [facility] was significant for the following: diuretic and ABX management, s/p trach and PEG (placed on 3/7- PEG since removed), RCU admission, high grade fevers (secondary to panniculitis), progressively worsening sacral ulcer (likely osteomyelitis- s/p surgical debridement), BL foot wound (secondary to pressor use), neuropathy (secondary to critical illness polyneuropathy), ELLA (secondary to vancomycin toxicity and overdiuresis- since DCd- with improvement). Patient now admitted for a multidisciplinary rehab program. 04-25-25 @ 15:19    #Debility Secondary to Acute Hypoxic Respiratory Failure/ARDS 2/2 PNA  #BEA   #Critical Illness Polyneuropathy   -s/p VV ECMO, s/p diuresis, TTE/ROBERT with RV failure, s/p treatment for pneumonia  -Repeat Echo 3/11 showed EF 66 %. RV is not well visualized, enlarged RV cavity size a/ reduced RV systolic function. No significant valvular disease.  No echocardiographic evidence of pulmonary hypertension.  -s/p Trach (decannulated 3/28) and PEG (removed 4/15)   -Saturating well on RA  -Continue duoneb PRN   -BIPAP QHS  -Fall precaution  -Pain control and bowel regimen per rehab team  -Comprehensive rehab program with PT/OT/SLP    #Sacral Osteomyelitis  -Continue Zosyn TID - last dose evening of 5/11/25    #Fungemia 2/2 Panniculitis  -Blood culture 3/15 and 3/16 with candida albicans  -s/p course of antifungals   -Repeat cultures negative since 3/18    #Chronic Paroxysmal AFIB  - Eliquis 5mg BID     #RIJ and Bilateral LE DVT  -Duplex RUE 3/14 showed Acute, non-occlusive deep vein thrombosis noted within the right internal jugular vein. Superficial vein thrombosis noted within the right antecubital cephalic vein.  -Duplex LE 3/14 showed Acute, occlusive deep vein thromboses noted within the right and left peroneal veins at both mid calves. Age-indeterminate, occlusive deep vein thrombosis visualized within the left gastrocnemius vein at the proximal calf.  -Continue eliquis    #Normocytic Anemia   -Likely multifactorial  -H/H stable, no evidence of active bleed   -Monitor CBC     #HTN  -Continue Amlodipine  -Monitor BP     #Hypokalemia  -Will replete  -Monitor BMP    #Sleep/Mood  - Continue Melatonin 3mg at HS   - Continue Quetiapine 25mg at HS     #History of Pseudotumor Cerebri   -Outpatient follow up     #Sacral/Gluteal Cleft and Bilateral Buttocks Wounds  #Bilateral Foot Wounds  -Continue local wound care  -Plastic consult     #ELLA (Improved)  -Baseline Cr appears to be !0.8 range   -Felt to be multifactorial in setting of cardiorenal w/ RVE, recurrent ELLA suspected due to vancomycin toxicity and diuresis   -Continue Renvela 800mg TID, monitor phos level to taper off  -Monitor BMP     #Type 2 Diabetes  -HbA1C 6.7 on 2/25, will repeat with next blood draw  -FS trend reviewed, has been within goal  -Will d/c RAISS  -Monitor glucose on routine lab     #GI PPx  -PPI    #DVT PPx  -On Eliquis    Discussed with rehab team

## 2025-04-26 NOTE — CONSULT NOTE ADULT - SUBJECTIVE AND OBJECTIVE BOX
HPI  39 year old male with PMH of morbid obesity, BEA on CPAP, pAFIB (not on AC), HTN, and BL papilledema attributed to pseudotumor cerebri; who initially presented to  on 2/24 with SOB and BL LE edema. Found to have URI with progressive SOB and multifocal PNA on imaging. His hospital course was complicated by worsening respiratory distress and increased 02 demands secondary to secretions (requiring multiple intubation attempts) and eventual MICU admission. While in MICU, he 02 requirements continued despite optimal vent management. Concern noted for progressive ARDS, unable to prone given morbid obesity, and ultimately cannulated for vvECMO on 2/25 and transferred to [facility] for further management on 2/26.    His hospital course at [facility] was significant for the following: diuretic and ABX management, s/p trache and PEG (placed on 3/7- PEG since removed), RCU admission, high grade fevers (secondary to panniculitis), progressively worsening sacral ulcer (likely osteomyelitis- s/p surgical debridement), BL foot wound (secondary to pressor use), neuropathy (secondary to critical illness polyneuropathy), ELLA (secondary to vancomycin toxicity and overdiuresis- since DCd- with improvement). PM&R was consulted and deemed him an appropriate candidate for IRF. He was medically stabilized and cleared for discharge to [facility].     Patient seen and examined at bedside. No acute events noted.  Endorses bilateral feet burning/pins and needles sensation.   Rest of ROS are negative.     ALLERGIES:  No Known Allergies    PAST MEDICAL HISTORY:  Atrial fibrillation paroxysmal  Chronic sinusitis   HTN (hypertension)   Morbid obesity   Papilledema of both eyes.     PAST SURGICAL HISTORY:  No significant past surgical history.    SOCIAL HISTORY  Smoking - Current every day smoker   EtOH - Denied   Drugs - Denied    MEDICATIONS  (STANDING):  amLODIPine   Tablet 10 milliGRAM(s) Oral daily  apixaban 5 milliGRAM(s) Oral two times a day  bisacodyl 10 milliGRAM(s) Oral at bedtime  capsaicin 0.025% Cream 1 Application(s) Topical four times a day  clotrimazole 1% Cream 1 Application(s) Topical <User Schedule>  Dakins Solution - 1/4 Strength 1 Application(s) Topical two times a day  dextrose 5%. 1000 milliLiter(s) (100 mL/Hr) IV Continuous <Continuous>  dextrose 5%. 1000 milliLiter(s) (50 mL/Hr) IV Continuous <Continuous>  dextrose 50% Injectable 25 Gram(s) IV Push once  dextrose 50% Injectable 12.5 Gram(s) IV Push once  dextrose 50% Injectable 25 Gram(s) IV Push once  DULoxetine 60 milliGRAM(s) Oral daily  gabapentin 1200 milliGRAM(s) Oral every 8 hours  glucagon  Injectable 1 milliGRAM(s) IntraMuscular once  influenza   Vaccine 0.5 milliLiter(s) IntraMuscular once  insulin lispro (ADMELOG) corrective regimen sliding scale   SubCutaneous at bedtime  insulin lispro (ADMELOG) corrective regimen sliding scale   SubCutaneous three times a day before meals  lidocaine   4% Patch 1 Patch Transdermal daily  lidocaine   4% Patch 1 Patch Transdermal daily  melatonin 3 milliGRAM(s) Oral at bedtime  multivitamin 1 Tablet(s) Oral daily  mupirocin 2% Ointment 1 Application(s) Topical two times a day  naloxegol 25 milliGRAM(s) Oral daily  pantoprazole    Tablet 40 milliGRAM(s) Oral before breakfast  piperacillin/tazobactam IVPB.. 3.375 Gram(s) IV Intermittent every 8 hours  polyethylene glycol 3350 17 Gram(s) Oral two times a day  povidone iodine 10% Solution 1 Application(s) Topical daily  QUEtiapine 25 milliGRAM(s) Oral at bedtime  sevelamer carbonate 800 milliGRAM(s) Oral three times a day with meals    MEDICATIONS  (PRN):  acetaminophen     Tablet .. 650 milliGRAM(s) Oral every 6 hours PRN Temp greater or equal to 38C (100.4F), Mild Pain (1 - 3), Moderate Pain (4 - 6)  albuterol/ipratropium for Nebulization 3 milliLiter(s) Nebulizer every 6 hours PRN Shortness of Breath and/or Wheezing  cyclobenzaprine 5 milliGRAM(s) Oral three times a day PRN Muscle Spasm  dextrose Oral Gel 15 Gram(s) Oral once PRN Blood Glucose LESS THAN 70 milliGRAM(s)/deciliter  oxyCODONE    IR 5 milliGRAM(s) Oral two times a day PRN Severe Pain (7 - 10) related to dressing change  oxyCODONE    IR 15 milliGRAM(s) Oral every 6 hours PRN Severe Pain (7 - 10)    Vital Signs Last 24 Hrs  T(F): 98.3 (26 Apr 2025 07:51), Max: 99.1 (25 Apr 2025 17:06)  HR: 105 (26 Apr 2025 07:51) (104 - 115)  BP: 137/82 (26 Apr 2025 07:51) (131/85 - 144/79)  RR: 16 (26 Apr 2025 07:51) (16 - 16)  SpO2: 95% (26 Apr 2025 07:51) (94% - 95%)  I&O's Summary    25 Apr 2025 07:01  -  26 Apr 2025 07:00  --------------------------------------------------------  IN: 0 mL / OUT: 300 mL / NET: -300 mL    BMI (kg/m2): 58.6 (04-25-25 @ 17:06)    PHYSICAL EXAM:  GENERAL: NAD  HEENT: NCAT, trach stoma covered with gauze   CHEST/LUNG: Clear to percussion bilaterally; No rales, rhonchi, wheezing  HEART: Regular rate and rhythm; No murmurs  ABDOMEN: Soft, Nontender, Nondistended; Bowel sounds present, previous PEG insertion site covered with dressing   MUSCULOSKELETAL/EXTREMITIES:  2+ Peripheral Pulses, No LE edema  SKIN: unstageable sacral pressure ulcer  Right buttock wound s/p debridement - pink, no drainage  Left buttock stage 2-3 pressure ulcer   Bilateral foot wounds, ischemic changes of 4th/5th toes   PSYCH: Appropriate affect  NEURO: Alert & Oriented x 3    I personally reviewed the below data/images/labs:    LABS:                        8.3    5.16  )-----------( 366      ( 26 Apr 2025 06:34 )             29.0       04-26    138  |  100  |  8   ----------------------------<  95  3.3   |  30  |  1.22    Ca    10.0      26 Apr 2025 06:34  Phos  5.0     04-24  Mg     1.80     04-24    TPro  7.8  /  Alb  2.3  /  TBili  0.4  /  DBili  x   /  AST  20  /  ALT  27  /  AlkPhos  56  04-26     03-08 Chol -- LDL -- HDL -- Trig 169 mg/dL    POCT Blood Glucose.: 101 mg/dL (26 Apr 2025 08:15)  POCT Blood Glucose.: 97 mg/dL (25 Apr 2025 20:54)  POCT Blood Glucose.: 101 mg/dL (25 Apr 2025 17:15)  POCT Blood Glucose.: 108 mg/dL (25 Apr 2025 11:17)    Urinalysis Basic - ( 26 Apr 2025 06:34 )    Color: x / Appearance: x / SG: x / pH: x  Gluc: 95 mg/dL / Ketone: x  / Bili: x / Urobili: x   Blood: x / Protein: x / Nitrite: x   Leuk Esterase: x / RBC: x / WBC x   Sq Epi: x / Non Sq Epi: x / Bacteria: x    COVID-19 PCR: NotDetec (04-25-25 @ 18:31)    Consultant(s) Notes Reviewed:   Care Discused with Consultants/Other Providers:  Imaging Personally Reviewed:

## 2025-04-26 NOTE — DIETITIAN INITIAL EVALUATION ADULT - PERTINENT MEDS FT
MEDICATIONS  (STANDING):  amLODIPine   Tablet 10 milliGRAM(s) Oral daily  apixaban 5 milliGRAM(s) Oral two times a day  bisacodyl 10 milliGRAM(s) Oral at bedtime  capsaicin 0.025% Cream 1 Application(s) Topical four times a day  clotrimazole 1% Cream 1 Application(s) Topical <User Schedule>  Dakins Solution - 1/4 Strength 1 Application(s) Topical two times a day  dextrose 5%. 1000 milliLiter(s) (100 mL/Hr) IV Continuous <Continuous>  dextrose 5%. 1000 milliLiter(s) (50 mL/Hr) IV Continuous <Continuous>  dextrose 50% Injectable 25 Gram(s) IV Push once  dextrose 50% Injectable 12.5 Gram(s) IV Push once  dextrose 50% Injectable 25 Gram(s) IV Push once  DULoxetine 60 milliGRAM(s) Oral daily  gabapentin 1200 milliGRAM(s) Oral every 8 hours  glucagon  Injectable 1 milliGRAM(s) IntraMuscular once  influenza   Vaccine 0.5 milliLiter(s) IntraMuscular once  insulin lispro (ADMELOG) corrective regimen sliding scale   SubCutaneous at bedtime  insulin lispro (ADMELOG) corrective regimen sliding scale   SubCutaneous three times a day before meals  lidocaine   4% Patch 1 Patch Transdermal daily  lidocaine   4% Patch 1 Patch Transdermal daily  melatonin 3 milliGRAM(s) Oral at bedtime  multivitamin 1 Tablet(s) Oral daily  mupirocin 2% Ointment 1 Application(s) Topical two times a day  naloxegol 25 milliGRAM(s) Oral daily  pantoprazole    Tablet 40 milliGRAM(s) Oral before breakfast  piperacillin/tazobactam IVPB.. 3.375 Gram(s) IV Intermittent every 8 hours  polyethylene glycol 3350 17 Gram(s) Oral two times a day  povidone iodine 10% Solution 1 Application(s) Topical daily  QUEtiapine 25 milliGRAM(s) Oral at bedtime  sevelamer carbonate 800 milliGRAM(s) Oral three times a day with meals    MEDICATIONS  (PRN):  acetaminophen     Tablet .. 650 milliGRAM(s) Oral every 6 hours PRN Temp greater or equal to 38C (100.4F), Mild Pain (1 - 3), Moderate Pain (4 - 6)  albuterol/ipratropium for Nebulization 3 milliLiter(s) Nebulizer every 6 hours PRN Shortness of Breath and/or Wheezing  cyclobenzaprine 5 milliGRAM(s) Oral three times a day PRN Muscle Spasm  dextrose Oral Gel 15 Gram(s) Oral once PRN Blood Glucose LESS THAN 70 milliGRAM(s)/deciliter  oxyCODONE    IR 5 milliGRAM(s) Oral two times a day PRN Severe Pain (7 - 10) related to dressing change  oxyCODONE    IR 15 milliGRAM(s) Oral every 6 hours PRN Severe Pain (7 - 10)

## 2025-04-26 NOTE — DIETITIAN INITIAL EVALUATION ADULT - ADD RECOMMEND
1. Low Na, Low phos, consistent carbohydrate  2. Ensure Max BID, Efrain BID  3. Add MVI, Vitamin C 500mg daily, Zinc Sulfate 220mg x 14 days

## 2025-04-26 NOTE — DIETITIAN INITIAL EVALUATION ADULT - NSICDXPASTSURGICALHX_GEN_ALL_CORE_FT
PAST SURGICAL HISTORY:  No significant past surgical history      "all these people and they keep moving me"

## 2025-04-26 NOTE — DIETITIAN INITIAL EVALUATION ADULT - OTHER INFO
SAL CHILDERS is a 39 year old male with PMH of morbid obesity, BEA on CPAP, pAFIB (not on AC), HTN, and BL papilledema attributed to pseudotumor cerebri; who initially presented to  on 2/24 with SOB and BL LE edema. Found to have URI with progressive SOB and multifocal PNA on imaging. His hospital course was complicated by worsening respiratory distress and increased 02 demands secondary to secretions (requiring multiple intubation attempts) and eventual MICU admission. While in MICU, he 02 requirements continued despite optimal vent management. Concern noted for progressive ARDS, unable to prone given morbid obesity, and ultimately cannulated for vvECMO on 2/25 and transferred to [facility] for further management on 2/26.    His hospital course at [facility] was significant for the following: diuretic and ABX management, s/p trache and PEG (placed on 3/7- PEG since removed), RCU admission, high grade fevers (secondary to panniculitis), progressively worsening sacral ulcer (likely osteomyelitis- s/p surgical debridement), BL foot wound (secondary to pressor use), neuropathy (secondary to critical illness polyneuropathy), ELLA (secondary to vancomycin toxicity and overdiuresis- since DCd- with improvement). Patient now admitted for a multidisciplinary rehab program.    Pt recommended for regular with thin liquids per SLP 4/26 in house. Pt tolerating well, although reports early satiety. CBW 408lbs, morbid obesity. Weight fluctuation noted due to fluid retention. +multiple pressure ulcers > stage II. +2 edema bilateral legs. Pt denies N/V/D, constipation. Last BM 4/25. Pt noted with hyperphosphatemia- currently on dietary phosphorus restriction. Hypokalemia noted- repletion in progress. A1c 6.7% 2/25/25- would suggest obtaining new A1c. Glucose levels WDL. Continue current nutrition plan of care- increase Efrain to BID. Add MVI, Vitamin C 500mg daily and Zinc Sulfate 220mg x 14 days.

## 2025-04-26 NOTE — DIETITIAN INITIAL EVALUATION ADULT - NS FNS DIET ORDER
Diet, Regular:   Consistent Carbohydrate {Evening Snack} (CSTCHOSN)  No Concentrated Phosphorus  Efrain(7 Gm Arginine/7 Gm Glut/1.2 Gm HMB     Qty per Day:  1  Supplement Feeding Modality:  Oral  Ensure Max Cans or Servings Per Day:  1       Frequency:  Two Times a day (04-25-25 @ 15:31)

## 2025-04-26 NOTE — DIETITIAN INITIAL EVALUATION ADULT - ORAL INTAKE PTA/DIET HISTORY
PEG removed 4/15. Decannulated 3/28. Pt tolerating regular diet with report of early satiety, but good acceptance of Ensure Max supplements BID along with Efrain BID.  PEG removed 4/15. Decannulated 3/28. Pt tolerating regular diet with report of early satiety, but good acceptance of Ensure Max supplements BID along with Efrain BID. NKFA.

## 2025-04-26 NOTE — DIETITIAN INITIAL EVALUATION ADULT - NSFNSPHYEXAMSKINFT_GEN_A_CORE
Pressure Injury 1: coccyx, Unstageable  Pressure Injury 2: right buttock, Unstageable  Pressure Injury 3: left buttock, Stage III  Pressure Injury 4: none, none  Pressure Injury 5: none, none  Pressure Injury 6: none, none  Pressure Injury 7: none, none  Pressure Injury 8: none, none  Pressure Injury 9: none, none  Pressure Injury 10: none, none  Pressure Injury 11: none, none Pressure Injury 1: coccyx, Unstageable  Pressure Injury 2: right buttock, Unstageable  Pressure Injury 3: left buttock, Stage III

## 2025-04-27 LAB
ANION GAP SERPL CALC-SCNC: 8 MMOL/L — SIGNIFICANT CHANGE UP (ref 5–17)
BUN SERPL-MCNC: 11 MG/DL — SIGNIFICANT CHANGE UP (ref 7–23)
CALCIUM SERPL-MCNC: 9.8 MG/DL — SIGNIFICANT CHANGE UP (ref 8.4–10.5)
CHLORIDE SERPL-SCNC: 101 MMOL/L — SIGNIFICANT CHANGE UP (ref 96–108)
CO2 SERPL-SCNC: 30 MMOL/L — SIGNIFICANT CHANGE UP (ref 22–31)
CREAT SERPL-MCNC: 1.35 MG/DL — HIGH (ref 0.5–1.3)
EGFR: 69 ML/MIN/1.73M2 — SIGNIFICANT CHANGE UP
EGFR: 69 ML/MIN/1.73M2 — SIGNIFICANT CHANGE UP
GLUCOSE SERPL-MCNC: 93 MG/DL — SIGNIFICANT CHANGE UP (ref 70–99)
MAGNESIUM SERPL-MCNC: 1.6 MG/DL — SIGNIFICANT CHANGE UP (ref 1.6–2.6)
PHOSPHATE SERPL-MCNC: 5.4 MG/DL — HIGH (ref 2.5–4.5)
POTASSIUM SERPL-MCNC: 3.4 MMOL/L — LOW (ref 3.5–5.3)
POTASSIUM SERPL-SCNC: 3.4 MMOL/L — LOW (ref 3.5–5.3)
SODIUM SERPL-SCNC: 139 MMOL/L — SIGNIFICANT CHANGE UP (ref 135–145)

## 2025-04-27 PROCEDURE — 99232 SBSQ HOSP IP/OBS MODERATE 35: CPT

## 2025-04-27 RX ORDER — ZINC SULFATE 50(220)MG
220 CAPSULE ORAL DAILY
Refills: 0 | Status: DISCONTINUED | OUTPATIENT
Start: 2025-04-27 | End: 2025-06-13

## 2025-04-27 RX ADMIN — Medication 1 TABLET(S): at 14:14

## 2025-04-27 RX ADMIN — APIXABAN 5 MILLIGRAM(S): 2.5 TABLET, FILM COATED ORAL at 06:52

## 2025-04-27 RX ADMIN — APIXABAN 5 MILLIGRAM(S): 2.5 TABLET, FILM COATED ORAL at 20:23

## 2025-04-27 RX ADMIN — Medication 1 APPLICATION(S): at 14:24

## 2025-04-27 RX ADMIN — Medication 25 GRAM(S): at 14:19

## 2025-04-27 RX ADMIN — Medication 1 APPLICATION(S): at 23:29

## 2025-04-27 RX ADMIN — SEVELAMER HYDROCHLORIDE 800 MILLIGRAM(S): 800 TABLET ORAL at 20:22

## 2025-04-27 RX ADMIN — SEVELAMER HYDROCHLORIDE 800 MILLIGRAM(S): 800 TABLET ORAL at 14:15

## 2025-04-27 RX ADMIN — Medication 1 APPLICATION(S): at 14:25

## 2025-04-27 RX ADMIN — CLOTRIMAZOLE 1 APPLICATION(S): 1 CREAM TOPICAL at 14:24

## 2025-04-27 RX ADMIN — Medication 40 MILLIEQUIVALENT(S): at 14:17

## 2025-04-27 RX ADMIN — MUPIROCIN CALCIUM 1 APPLICATION(S): 20 CREAM TOPICAL at 20:22

## 2025-04-27 RX ADMIN — NALOXEGOL OXALATE 25 MILLIGRAM(S): 12.5 TABLET, FILM COATED ORAL at 14:15

## 2025-04-27 RX ADMIN — AMLODIPINE BESYLATE 10 MILLIGRAM(S): 10 TABLET ORAL at 05:44

## 2025-04-27 RX ADMIN — Medication 1 APPLICATION(S): at 20:22

## 2025-04-27 RX ADMIN — GABAPENTIN 1200 MILLIGRAM(S): 400 CAPSULE ORAL at 05:44

## 2025-04-27 RX ADMIN — Medication 25 GRAM(S): at 06:52

## 2025-04-27 RX ADMIN — DULOXETINE 60 MILLIGRAM(S): 20 CAPSULE, DELAYED RELEASE ORAL at 14:15

## 2025-04-27 RX ADMIN — OXYCODONE HYDROCHLORIDE 15 MILLIGRAM(S): 30 TABLET ORAL at 20:30

## 2025-04-27 RX ADMIN — QUETIAPINE FUMARATE 25 MILLIGRAM(S): 25 TABLET ORAL at 23:23

## 2025-04-27 RX ADMIN — CYCLOBENZAPRINE HYDROCHLORIDE 5 MILLIGRAM(S): 15 CAPSULE, EXTENDED RELEASE ORAL at 14:30

## 2025-04-27 RX ADMIN — SODIUM HYPOCHLORITE 1 APPLICATION(S): 0.12 SOLUTION TOPICAL at 06:53

## 2025-04-27 RX ADMIN — SEVELAMER HYDROCHLORIDE 800 MILLIGRAM(S): 800 TABLET ORAL at 09:54

## 2025-04-27 RX ADMIN — Medication 3 MILLIGRAM(S): at 23:24

## 2025-04-27 RX ADMIN — OXYCODONE HYDROCHLORIDE 15 MILLIGRAM(S): 30 TABLET ORAL at 09:54

## 2025-04-27 RX ADMIN — Medication 40 MILLIGRAM(S): at 05:44

## 2025-04-27 RX ADMIN — SODIUM HYPOCHLORITE 1 APPLICATION(S): 0.12 SOLUTION TOPICAL at 20:22

## 2025-04-27 RX ADMIN — MUPIROCIN CALCIUM 1 APPLICATION(S): 20 CREAM TOPICAL at 06:52

## 2025-04-27 RX ADMIN — GABAPENTIN 1200 MILLIGRAM(S): 400 CAPSULE ORAL at 14:18

## 2025-04-27 RX ADMIN — GABAPENTIN 1200 MILLIGRAM(S): 400 CAPSULE ORAL at 23:21

## 2025-04-27 NOTE — PROGRESS NOTE ADULT - SUBJECTIVE AND OBJECTIVE BOX
Pt. seen and examined at bedside.  No overnight events.      REVIEW OF SYSTEMS  Constitutional - No fever,  No fatigue  Neurological - No headaches, ++ loss of strength  Musculoskeletal - ++ sacral pain, No joint swelling, No muscle pain    VITALS  T(C): 37.3 (04-26-25 @ 20:19), Max: 37.3 (04-26-25 @ 20:19)  HR: 122 (04-26-25 @ 20:19) (122 - 122)  BP: 115/72 (04-26-25 @ 20:19) (115/72 - 115/72)  RR: 16 (04-26-25 @ 20:19) (16 - 16)  SpO2: 92% (04-26-25 @ 20:19) (92% - 92%)  Wt(kg): --       MEDICATIONS   acetaminophen     Tablet .. 650 milliGRAM(s) every 6 hours PRN  albuterol/ipratropium for Nebulization 3 milliLiter(s) every 6 hours PRN  amLODIPine   Tablet 10 milliGRAM(s) daily  apixaban 5 milliGRAM(s) two times a day  bisacodyl 10 milliGRAM(s) at bedtime  capsaicin 0.025% Cream 1 Application(s) four times a day  clotrimazole 1% Cream 1 Application(s) <User Schedule>  cyclobenzaprine 5 milliGRAM(s) three times a day PRN  Dakins Solution - 1/4 Strength 1 Application(s) two times a day  dextrose 5%. 1000 milliLiter(s) <Continuous>  dextrose 5%. 1000 milliLiter(s) <Continuous>  dextrose 50% Injectable 25 Gram(s) once  dextrose 50% Injectable 12.5 Gram(s) once  dextrose 50% Injectable 25 Gram(s) once  dextrose Oral Gel 15 Gram(s) once PRN  DULoxetine 60 milliGRAM(s) daily  gabapentin 1200 milliGRAM(s) every 8 hours  glucagon  Injectable 1 milliGRAM(s) once  influenza   Vaccine 0.5 milliLiter(s) once  lidocaine   4% Patch 1 Patch daily  lidocaine   4% Patch 1 Patch daily  melatonin 3 milliGRAM(s) at bedtime  multivitamin 1 Tablet(s) daily  mupirocin 2% Ointment 1 Application(s) two times a day  naloxegol 25 milliGRAM(s) daily  oxyCODONE    IR 5 milliGRAM(s) two times a day PRN  oxyCODONE    IR 15 milliGRAM(s) every 6 hours PRN  pantoprazole    Tablet 40 milliGRAM(s) before breakfast  piperacillin/tazobactam IVPB.. 3.375 Gram(s) every 8 hours  polyethylene glycol 3350 17 Gram(s) two times a day  povidone iodine 10% Solution 1 Application(s) daily  QUEtiapine 25 milliGRAM(s) at bedtime  sevelamer carbonate 800 milliGRAM(s) three times a day with meals      RECENT LABS/IMAGING                        8.3    5.16  )-----------( 366      ( 26 Apr 2025 06:34 )             29.0     04-27    139  |  101  |  11  ----------------------------<  93  3.4[L]   |  30  |  1.35[H]    Ca    9.8      27 Apr 2025 07:43  Phos  5.4     04-27  Mg     1.6     04-27    TPro  7.8  /  Alb  2.3[L]  /  TBili  0.4  /  DBili  x   /  AST  20  /  ALT  27  /  AlkPhos  56  04-26      Urinalysis Basic - ( 27 Apr 2025 07:43 )    Color: x / Appearance: x / SG: x / pH: x  Gluc: 93 mg/dL / Ketone: x  / Bili: x / Urobili: x   Blood: x / Protein: x / Nitrite: x   Leuk Esterase: x / RBC: x / WBC x   Sq Epi: x / Non Sq Epi: x / Bacteria: x              POCT Blood Glucose.: 99 mg/dL (04-26-25 @ 11:45)    ---------  PHYSICAL EXAM  Constitutional - NAD, Comfortable  Pulm - Breathing comfortably, No wheezing  Abd - morbidly obese, soft, NTND  Extremities - b/l pedal edema; foot/toe ischemic soft tissue injuries  Sacral decub  Neurologic Exam -                    Cognitive - Awake, Alert     Communication - Fluent     Motor - b/l LE weakness     Sensory - Intact to LT  Psychiatric - Mood WNL, Affect WNL    ASSESSMENT/PLAN  39y Male with functional deficits 2' ARDS/respiratory failure.  Continue current medical management  Pain - Tylenol PRN; gabapentin; duloxetine; oxycodone  DVT PPX - apixaban 5 milliGRAM(s) two times a day  Active issues - sacral decub and foot/toe ischemic injuries  Continue 3hrs a day of comprehensive rehab program.

## 2025-04-27 NOTE — PROGRESS NOTE ADULT - SUBJECTIVE AND OBJECTIVE BOX
Interval History  Patient seen and examined at bedside. No acute events noted.   Interval History  Patient seen and examined at bedside. No acute events noted.  Patient reports feeling okay, denies any acute complaints.   Worked night shift at  x 15 years, usually does not sleep well at night. Tired this morning.     ALLERGIES:  No Known Allergies    MEDICATIONS  (STANDING):  amLODIPine   Tablet 10 milliGRAM(s) Oral daily  apixaban 5 milliGRAM(s) Oral two times a day  bisacodyl 10 milliGRAM(s) Oral at bedtime  capsaicin 0.025% Cream 1 Application(s) Topical four times a day  clotrimazole 1% Cream 1 Application(s) Topical <User Schedule>  Dakins Solution - 1/4 Strength 1 Application(s) Topical two times a day  dextrose 5%. 1000 milliLiter(s) (100 mL/Hr) IV Continuous <Continuous>  dextrose 5%. 1000 milliLiter(s) (50 mL/Hr) IV Continuous <Continuous>  dextrose 50% Injectable 25 Gram(s) IV Push once  dextrose 50% Injectable 12.5 Gram(s) IV Push once  dextrose 50% Injectable 25 Gram(s) IV Push once  DULoxetine 60 milliGRAM(s) Oral daily  gabapentin 1200 milliGRAM(s) Oral every 8 hours  glucagon  Injectable 1 milliGRAM(s) IntraMuscular once  influenza   Vaccine 0.5 milliLiter(s) IntraMuscular once  lidocaine   4% Patch 1 Patch Transdermal daily  lidocaine   4% Patch 1 Patch Transdermal daily  melatonin 3 milliGRAM(s) Oral at bedtime  multivitamin 1 Tablet(s) Oral daily  mupirocin 2% Ointment 1 Application(s) Topical two times a day  naloxegol 25 milliGRAM(s) Oral daily  pantoprazole    Tablet 40 milliGRAM(s) Oral before breakfast  piperacillin/tazobactam IVPB.. 3.375 Gram(s) IV Intermittent every 8 hours  polyethylene glycol 3350 17 Gram(s) Oral two times a day  povidone iodine 10% Solution 1 Application(s) Topical daily  QUEtiapine 25 milliGRAM(s) Oral at bedtime  sevelamer carbonate 800 milliGRAM(s) Oral three times a day with meals    MEDICATIONS  (PRN):  acetaminophen     Tablet .. 650 milliGRAM(s) Oral every 6 hours PRN Temp greater or equal to 38C (100.4F), Mild Pain (1 - 3), Moderate Pain (4 - 6)  albuterol/ipratropium for Nebulization 3 milliLiter(s) Nebulizer every 6 hours PRN Shortness of Breath and/or Wheezing  cyclobenzaprine 5 milliGRAM(s) Oral three times a day PRN Muscle Spasm  dextrose Oral Gel 15 Gram(s) Oral once PRN Blood Glucose LESS THAN 70 milliGRAM(s)/deciliter  oxyCODONE    IR 5 milliGRAM(s) Oral two times a day PRN Severe Pain (7 - 10) related to dressing change  oxyCODONE    IR 15 milliGRAM(s) Oral every 6 hours PRN Severe Pain (7 - 10)    Vital Signs Last 24 Hrs  T(F): 99.1 (26 Apr 2025 20:19), Max: 99.1 (26 Apr 2025 20:19)  HR: 122 (26 Apr 2025 20:19) (122 - 122)  BP: 115/72 (26 Apr 2025 20:19) (115/72 - 115/72)  RR: 16 (26 Apr 2025 20:19) (16 - 16)  SpO2: 92% (26 Apr 2025 20:19) (92% - 92%)  I&O's Summary    BMI (kg/m2): 58.6 (04-27-25 @ 10:03)    PHYSICAL EXAM:  GENERAL: NAD  HEENT: NCAT, trach stoma covered with gauze   CHEST/LUNG: Clear to percussion bilaterally; No rales, rhonchi, wheezing  HEART: Regular rate and rhythm; No murmurs  ABDOMEN: Soft, Nontender, Nondistended; Bowel sounds present, previous PEG insertion site covered with dressing   MUSCULOSKELETAL/EXTREMITIES:  2+ Peripheral Pulses, No LE edema  PSYCH: Appropriate affect  NEURO: Alert & Oriented x 3    I personally reviewed the below data/images/labs:    LABS:                        8.3    5.16  )-----------( 366      ( 26 Apr 2025 06:34 )             29.0       04-27    139  |  101  |  11  ----------------------------<  93  3.4   |  30  |  1.35    Ca    9.8      27 Apr 2025 07:43  Phos  5.4     04-27  Mg     1.6     04-27    TPro  7.8  /  Alb  2.3  /  TBili  0.4  /  DBili  x   /  AST  20  /  ALT  27  /  AlkPhos  56  04-26     03-08 Chol -- LDL -- HDL -- Trig 169 mg/dL    POCT Blood Glucose.: 99 mg/dL (26 Apr 2025 11:45)    Urinalysis Basic - ( 27 Apr 2025 07:43 )    Color: x / Appearance: x / SG: x / pH: x  Gluc: 93 mg/dL / Ketone: x  / Bili: x / Urobili: x   Blood: x / Protein: x / Nitrite: x   Leuk Esterase: x / RBC: x / WBC x   Sq Epi: x / Non Sq Epi: x / Bacteria: x    COVID-19 PCR: NotDetec (04-25-25 @ 18:31)    Consultant(s) Notes Reviewed:   Care Discused with Consultants/Other Providers:  Imaging Personally Reviewed:

## 2025-04-27 NOTE — PROGRESS NOTE ADULT - ASSESSMENT
39 year old male with PMH of morbid obesity, BEA on CPAP, pAFIB (not on AC), HTN, and BL papilledema attributed to pseudotumor cerebri; who initially presented to  on 2/24 with SOB and BL LE edema. Found to have URI with progressive SOB and multifocal PNA on imaging. His hospital course was complicated by worsening respiratory distress and increased 02 demands secondary to secretions (requiring multiple intubation attempts) and eventual MICU admission. While in MICU, he 02 requirements continued despite optimal vent management. Concern noted for progressive ARDS, unable to prone given morbid obesity, and ultimately cannulated for vvECMO on 2/25 and transferred to [facility] for further management on 2/26. His hospital course at [facility] was significant for the following: diuretic and ABX management, s/p trach and PEG (placed on 3/7- PEG since removed), RCU admission, high grade fevers (secondary to panniculitis), progressively worsening sacral ulcer (likely osteomyelitis- s/p surgical debridement), BL foot wound (secondary to pressor use), neuropathy (secondary to critical illness polyneuropathy), ELLA (secondary to vancomycin toxicity and overdiuresis- since DCd- with improvement). Patient now admitted for a multidisciplinary rehab program. 04-25-25 @ 15:19    #Debility Secondary to Acute Hypoxic Respiratory Failure/ARDS 2/2 PNA  #BEA (Not on CPAP)  #Critical Illness Polyneuropathy   -s/p VV ECMO, s/p diuresis, TTE/ROBERT with RV failure, s/p treatment for pneumonia  -Repeat Echo 3/11 showed EF 66 %. RV is not well visualized, enlarged RV cavity size a/ reduced RV systolic function. No significant valvular disease.  No echocardiographic evidence of pulmonary hypertension.  -s/p Trach (decannulated 3/28) and PEG (removed 4/15)   -Saturating well on RA  -Continue duoneb PRN   -BIPAP QHS (FiO2 40%, 18/10) - pending nasal mask per patient's preference due to claustrophobia   -Fall precaution  -Pain control and bowel regimen per rehab team  -Comprehensive rehab program with PT/OT/SLP  -Outpatient pulm follow up     #Sacral Osteomyelitis  -Continue Zosyn TID - last dose evening of 5/11/25    #Fungemia 2/2 Panniculitis  -Blood culture 3/15 and 3/16 with candida albicans  -s/p course of antifungals   -Repeat cultures negative since 3/18    #Chronic Paroxysmal AFIB  -Eliquis 5mg BID   -Not on AVN blocker   -Tachycardic overnight, will check EKG     #RIJ and Bilateral LE DVT  -Duplex RUE 3/14 showed Acute, non-occlusive deep vein thrombosis noted within the right internal jugular vein. Superficial vein thrombosis noted within the right antecubital cephalic vein.  -Duplex LE 3/14 showed Acute, occlusive deep vein thromboses noted within the right and left peroneal veins at both mid calves. Age-indeterminate, occlusive deep vein thrombosis visualized within the left gastrocnemius vein at the proximal calf.  -Continue eliquis    #Normocytic Anemia   -Likely multifactorial  -H/H stable, no evidence of active bleed   -Monitor CBC     #HTN  -Continue Amlodipine  -Monitor BP     #Hypokalemia  -Will replete  -Monitor BMP    #Sleep/Mood  -Continue Melatonin 3mg at HS   -Continue Quetiapine 25mg at HS     #History of Pseudotumor Cerebri   -Outpatient follow up     #Sacral/Gluteal Cleft and Bilateral Buttocks Wounds  #Bilateral Foot Wounds  -Continue local wound care  -Plastic consult     #ELLA (Improved)  -Baseline Cr appears to be ~0.8 range   -ELLA felt to be multifactorial in setting of cardiorenal w/ RVE, recurrent ELLA suspected due to vancomycin toxicity and diuresis   -Continue Renvela 800mg TID, monitor phos level to taper off  -Monitor BMP     #Type 2 Diabetes  -HbA1C 6.7 on 2/25, will repeat with next blood draw  -FS trend reviewed, has been within goal  -Will d/c RAISS  -Monitor glucose on routine lab     #GI PPx  -PPI    #DVT PPx  -On Eliquis    Discussed with rehab team    39 year old male with PMH of morbid obesity, BEA on CPAP, pAFIB (not on AC), HTN, and BL papilledema attributed to pseudotumor cerebri; who initially presented to  on 2/24 with SOB and BL LE edema. Found to have URI with progressive SOB and multifocal PNA on imaging. His hospital course was complicated by worsening respiratory distress and increased 02 demands secondary to secretions (requiring multiple intubation attempts) and eventual MICU admission. While in MICU, he 02 requirements continued despite optimal vent management. Concern noted for progressive ARDS, unable to prone given morbid obesity, and ultimately cannulated for vvECMO on 2/25 and transferred to [facility] for further management on 2/26. His hospital course at [facility] was significant for the following: diuretic and ABX management, s/p trach and PEG (placed on 3/7- PEG since removed), RCU admission, high grade fevers (secondary to panniculitis), progressively worsening sacral ulcer (likely osteomyelitis- s/p surgical debridement), BL foot wound (secondary to pressor use), neuropathy (secondary to critical illness polyneuropathy), ELLA (secondary to vancomycin toxicity and overdiuresis- since DCd- with improvement). Patient now admitted for a multidisciplinary rehab program. 04-25-25 @ 15:19    #Debility Secondary to Acute Hypoxic Respiratory Failure/ARDS 2/2 PNA  #BEA (Not on CPAP)  #Critical Illness Polyneuropathy   -s/p VV ECMO, s/p diuresis, TTE/ROBERT with RV failure, s/p treatment for pneumonia  -Repeat Echo 3/11 showed EF 66 %. RV is not well visualized, enlarged RV cavity size a/ reduced RV systolic function. No significant valvular disease.  No echocardiographic evidence of pulmonary hypertension.  -s/p Trach (decannulated 3/28) and PEG (removed 4/15)   -Saturating well on RA  -Continue duoneb PRN   -BIPAP QHS (FiO2 40%, 18/10) - pending nasal mask per patient's preference due to claustrophobia   -Fall precaution  -Pain control and bowel regimen per rehab team  -Comprehensive rehab program with PT/OT/SLP  -Outpatient pulm follow up     #Sacral Osteomyelitis  -Continue Zosyn TID - last dose evening of 5/11/25    #Fungemia 2/2 Panniculitis  -Blood culture 3/15 and 3/16 with candida albicans  -s/p course of antifungals   -Repeat cultures negative since 3/18    #Chronic Paroxysmal AFIB  -Eliquis 5mg BID   -Not on AVN blocker   -Tachycardic overnight, will check EKG     #RIJ and Bilateral LE DVT  -Duplex RUE 3/14 showed Acute, non-occlusive deep vein thrombosis noted within the right internal jugular vein. Superficial vein thrombosis noted within the right antecubital cephalic vein.  -Duplex LE 3/14 showed Acute, occlusive deep vein thromboses noted within the right and left peroneal veins at both mid calves. Age-indeterminate, occlusive deep vein thrombosis visualized within the left gastrocnemius vein at the proximal calf.  -Continue eliquis    #Normocytic Anemia   -Likely multifactorial  -H/H stable, no evidence of active bleed   -Monitor CBC     #HTN  -Continue Amlodipine  -Monitor BP     #Hypokalemia  -Will replete  -Monitor BMP    #Sleep/Mood  -Continue Melatonin 3mg at HS   -Continue Quetiapine 25mg at HS     #History of Pseudotumor Cerebri   -Outpatient follow up     #Sacral/Gluteal Cleft and Bilateral Buttocks Wounds  #Bilateral Foot Wounds  -Continue local wound care  -Plastic consult pending     #ELLA   -Baseline Cr appears to be ~0.8 range   -ELLA felt to be multifactorial in setting of cardiorenal w/ RVE, recurrent ELLA suspected due to vancomycin toxicity and diuresis   -Cr mild uptrend to 1.35 4/27  -Encourage oral hydration  -Continue Renvela 800mg TID, monitor phos level  -Low phos level   -Monitor BMP     #Type 2 Diabetes  -HbA1C 6.7 on 2/25, will repeat with next blood draw  -FS trend reviewed, has been within goal  -RAISS discontinued  -Monitor glucose on routine lab     #GI PPx  -PPI    #DVT PPx  -On Eliquis    Discussed with rehab team

## 2025-04-27 NOTE — DIETITIAN NUTRITION RISK NOTIFICATION - TREATMENT: THE FOLLOWING DIET HAS BEEN RECOMMENDED
Diet, Regular:   Consistent Carbohydrate {Evening Snack} (CSTCHOSN)  No Concentrated Phosphorus  Efrain(7 Gm Arginine/7 Gm Glut/1.2 Gm HMB     Qty per Day:  1  Supplement Feeding Modality:  Oral  Ensure Max Cans or Servings Per Day:  1       Frequency:  Two Times a day (04-27-25 @ 10:40) [Active]

## 2025-04-28 ENCOUNTER — TRANSCRIPTION ENCOUNTER (OUTPATIENT)
Age: 40
End: 2025-04-28

## 2025-04-28 LAB
A1C WITH ESTIMATED AVERAGE GLUCOSE RESULT: 4.9 % — SIGNIFICANT CHANGE UP (ref 4–5.6)
ALBUMIN SERPL ELPH-MCNC: 2.3 G/DL — LOW (ref 3.3–5)
ALP SERPL-CCNC: 54 U/L — SIGNIFICANT CHANGE UP (ref 40–120)
ALT FLD-CCNC: 24 U/L — SIGNIFICANT CHANGE UP (ref 10–45)
ANION GAP SERPL CALC-SCNC: 10 MMOL/L — SIGNIFICANT CHANGE UP (ref 5–17)
AST SERPL-CCNC: 22 U/L — SIGNIFICANT CHANGE UP (ref 10–40)
BASOPHILS # BLD AUTO: 0.03 K/UL — SIGNIFICANT CHANGE UP (ref 0–0.2)
BASOPHILS NFR BLD AUTO: 0.4 % — SIGNIFICANT CHANGE UP (ref 0–2)
BILIRUB SERPL-MCNC: 0.4 MG/DL — SIGNIFICANT CHANGE UP (ref 0.2–1.2)
BUN SERPL-MCNC: 11 MG/DL — SIGNIFICANT CHANGE UP (ref 7–23)
CALCIUM SERPL-MCNC: 9.5 MG/DL — SIGNIFICANT CHANGE UP (ref 8.4–10.5)
CHLORIDE SERPL-SCNC: 101 MMOL/L — SIGNIFICANT CHANGE UP (ref 96–108)
CO2 SERPL-SCNC: 29 MMOL/L — SIGNIFICANT CHANGE UP (ref 22–31)
CREAT SERPL-MCNC: 1.45 MG/DL — HIGH (ref 0.5–1.3)
EGFR: 63 ML/MIN/1.73M2 — SIGNIFICANT CHANGE UP
EGFR: 63 ML/MIN/1.73M2 — SIGNIFICANT CHANGE UP
EOSINOPHIL # BLD AUTO: 0.3 K/UL — SIGNIFICANT CHANGE UP (ref 0–0.5)
EOSINOPHIL NFR BLD AUTO: 4.2 % — SIGNIFICANT CHANGE UP (ref 0–6)
ESTIMATED AVERAGE GLUCOSE: 94 MG/DL — SIGNIFICANT CHANGE UP (ref 68–114)
GLUCOSE SERPL-MCNC: 106 MG/DL — HIGH (ref 70–99)
HCT VFR BLD CALC: 29.7 % — LOW (ref 39–50)
HGB BLD-MCNC: 8.7 G/DL — LOW (ref 13–17)
IMM GRANULOCYTES NFR BLD AUTO: 0.3 % — SIGNIFICANT CHANGE UP (ref 0–0.9)
LYMPHOCYTES # BLD AUTO: 1.79 K/UL — SIGNIFICANT CHANGE UP (ref 1–3.3)
LYMPHOCYTES # BLD AUTO: 24.8 % — SIGNIFICANT CHANGE UP (ref 13–44)
MAGNESIUM SERPL-MCNC: 1.7 MG/DL — SIGNIFICANT CHANGE UP (ref 1.6–2.6)
MCHC RBC-ENTMCNC: 25.2 PG — LOW (ref 27–34)
MCHC RBC-ENTMCNC: 29.3 G/DL — LOW (ref 32–36)
MCV RBC AUTO: 86.1 FL — SIGNIFICANT CHANGE UP (ref 80–100)
MONOCYTES # BLD AUTO: 0.94 K/UL — HIGH (ref 0–0.9)
MONOCYTES NFR BLD AUTO: 13 % — SIGNIFICANT CHANGE UP (ref 2–14)
NEUTROPHILS # BLD AUTO: 4.14 K/UL — SIGNIFICANT CHANGE UP (ref 1.8–7.4)
NEUTROPHILS NFR BLD AUTO: 57.3 % — SIGNIFICANT CHANGE UP (ref 43–77)
NRBC BLD AUTO-RTO: 0 /100 WBCS — SIGNIFICANT CHANGE UP (ref 0–0)
PHOSPHATE SERPL-MCNC: 5.1 MG/DL — HIGH (ref 2.5–4.5)
PLATELET # BLD AUTO: 388 K/UL — SIGNIFICANT CHANGE UP (ref 150–400)
POTASSIUM SERPL-MCNC: 4 MMOL/L — SIGNIFICANT CHANGE UP (ref 3.5–5.3)
POTASSIUM SERPL-SCNC: 4 MMOL/L — SIGNIFICANT CHANGE UP (ref 3.5–5.3)
PROT SERPL-MCNC: 7.7 G/DL — SIGNIFICANT CHANGE UP (ref 6–8.3)
RBC # BLD: 3.45 M/UL — LOW (ref 4.2–5.8)
RBC # FLD: 20.7 % — HIGH (ref 10.3–14.5)
SODIUM SERPL-SCNC: 140 MMOL/L — SIGNIFICANT CHANGE UP (ref 135–145)
WBC # BLD: 7.22 K/UL — SIGNIFICANT CHANGE UP (ref 3.8–10.5)
WBC # FLD AUTO: 7.22 K/UL — SIGNIFICANT CHANGE UP (ref 3.8–10.5)

## 2025-04-28 PROCEDURE — 93010 ELECTROCARDIOGRAM REPORT: CPT

## 2025-04-28 PROCEDURE — 71045 X-RAY EXAM CHEST 1 VIEW: CPT | Mod: 26

## 2025-04-28 PROCEDURE — 99232 SBSQ HOSP IP/OBS MODERATE 35: CPT

## 2025-04-28 RX ADMIN — OXYCODONE HYDROCHLORIDE 5 MILLIGRAM(S): 30 TABLET ORAL at 01:12

## 2025-04-28 RX ADMIN — Medication 1 APPLICATION(S): at 06:53

## 2025-04-28 RX ADMIN — MUPIROCIN CALCIUM 1 APPLICATION(S): 20 CREAM TOPICAL at 06:54

## 2025-04-28 RX ADMIN — CLOTRIMAZOLE 1 APPLICATION(S): 1 CREAM TOPICAL at 13:23

## 2025-04-28 RX ADMIN — SEVELAMER HYDROCHLORIDE 800 MILLIGRAM(S): 800 TABLET ORAL at 13:24

## 2025-04-28 RX ADMIN — Medication 1 TABLET(S): at 13:24

## 2025-04-28 RX ADMIN — SEVELAMER HYDROCHLORIDE 800 MILLIGRAM(S): 800 TABLET ORAL at 09:21

## 2025-04-28 RX ADMIN — GABAPENTIN 1200 MILLIGRAM(S): 400 CAPSULE ORAL at 06:52

## 2025-04-28 RX ADMIN — Medication 25 GRAM(S): at 10:56

## 2025-04-28 RX ADMIN — APIXABAN 5 MILLIGRAM(S): 2.5 TABLET, FILM COATED ORAL at 17:53

## 2025-04-28 RX ADMIN — Medication 40 MILLIGRAM(S): at 06:53

## 2025-04-28 RX ADMIN — AMLODIPINE BESYLATE 10 MILLIGRAM(S): 10 TABLET ORAL at 06:53

## 2025-04-28 RX ADMIN — Medication 500 MILLIGRAM(S): at 13:24

## 2025-04-28 RX ADMIN — SEVELAMER HYDROCHLORIDE 800 MILLIGRAM(S): 800 TABLET ORAL at 17:53

## 2025-04-28 RX ADMIN — DULOXETINE 60 MILLIGRAM(S): 20 CAPSULE, DELAYED RELEASE ORAL at 13:24

## 2025-04-28 RX ADMIN — NALOXEGOL OXALATE 25 MILLIGRAM(S): 12.5 TABLET, FILM COATED ORAL at 13:24

## 2025-04-28 RX ADMIN — Medication 25 GRAM(S): at 20:59

## 2025-04-28 RX ADMIN — CYCLOBENZAPRINE HYDROCHLORIDE 5 MILLIGRAM(S): 15 CAPSULE, EXTENDED RELEASE ORAL at 17:57

## 2025-04-28 RX ADMIN — GABAPENTIN 1200 MILLIGRAM(S): 400 CAPSULE ORAL at 13:31

## 2025-04-28 RX ADMIN — APIXABAN 5 MILLIGRAM(S): 2.5 TABLET, FILM COATED ORAL at 06:53

## 2025-04-28 RX ADMIN — Medication 25 GRAM(S): at 01:52

## 2025-04-28 RX ADMIN — SODIUM HYPOCHLORITE 1 APPLICATION(S): 0.12 SOLUTION TOPICAL at 06:54

## 2025-04-28 RX ADMIN — OXYCODONE HYDROCHLORIDE 5 MILLIGRAM(S): 30 TABLET ORAL at 02:12

## 2025-04-28 RX ADMIN — Medication 220 MILLIGRAM(S): at 13:24

## 2025-04-28 RX ADMIN — OXYCODONE HYDROCHLORIDE 15 MILLIGRAM(S): 30 TABLET ORAL at 17:57

## 2025-04-28 RX ADMIN — Medication 1 APPLICATION(S): at 13:26

## 2025-04-28 NOTE — PROGRESS NOTE ADULT - ASSESSMENT
39 year old male with PMH of morbid obesity, BEA, pAFIB (not on AC), HTN, and BL papilledema attributed to pseudotumor cerebri; who initially presented to  on 2/24 with SOB and BL LE edema. Found to have URI with progressive SOB and multifocal PNA on imaging. His hospital course was complicated by worsening respiratory distress and increased 02 demands secondary to secretions (requiring multiple intubation attempts) and eventual MICU admission. While in MICU, he 02 requirements continued despite optimal vent management. Concern noted for progressive ARDS, unable to prone given morbid obesity, and ultimately cannulated for vvECMO on 2/25 and transferred to [facility] for further management on 2/26. His hospital course at [facility] was significant for the following: diuretic and ABX management, s/p trach and PEG (placed on 3/7- PEG since removed), RCU admission, high grade fevers (secondary to panniculitis), progressively worsening sacral ulcer (likely osteomyelitis- s/p surgical debridement), BL foot wound (secondary to pressor use), neuropathy (secondary to critical illness polyneuropathy), ELLA (secondary to vancomycin toxicity and overdiuresis- since DCd- with improvement). Patient now admitted for a multidisciplinary rehab program. 04-25-25 @ 15:19    #Debility Secondary to Acute Hypoxic Respiratory Failure/ARDS 2/2 PNA  #BEA (Not on CPAP)  #Critical Illness Polyneuropathy   -s/p VV ECMO, s/p diuresis, TTE/ROBERT with RV failure, s/p treatment for pneumonia  -Repeat Echo 3/11 showed EF 66 %. RV is not well visualized, enlarged RV cavity size a/ reduced RV systolic function. No significant valvular disease.  No echocardiographic evidence of pulmonary hypertension.  -s/p Trach (decannulated 3/28) and PEG (removed 4/15)   -Saturating well on RA  -Continue duoneb PRN   -BIPAP QHS (FiO2 40%, 18/10) - pending nasal mask per patient's preference due to claustrophobia - discussed with RT 4/28  -Fall precaution  -Pain control and bowel regimen per rehab team  -Comprehensive rehab program with PT/OT/SLP  -Outpatient pulm follow up     #Possible Sacral Osteomyelitis  -Continue Zosyn TID - last dose evening of 5/11/25    #Fungemia 2/2 Panniculitis  -Blood culture 3/15 and 3/16 with candida albicans  -s/p course of antifungals   -Repeat cultures negative since 3/18    #Chronic Paroxysmal AFIB  -Eliquis 5mg BID   -Not on AVN blocker   -EKG 4/28 showed sinus tachycardia, no acute acute ST/T wave changes    #RIJ and Bilateral LE DVT  -Duplex RUE 3/14 showed Acute, non-occlusive deep vein thrombosis noted within the right internal jugular vein. Superficial vein thrombosis noted within the right antecubital cephalic vein.  -Duplex LE 3/14 showed Acute, occlusive deep vein thromboses noted within the right and left peroneal veins at both mid calves. Age-indeterminate, occlusive deep vein thrombosis visualized within the left gastrocnemius vein at the proximal calf.  -Continue eliquis    #Normocytic Anemia   -Likely multifactorial  -H/H stable, no evidence of active bleed   -Monitor CBC     #HTN  -Continue Amlodipine  -Monitor BP     #Hypokalemia (Resolved)  -s/p repletion  -Monitor BMP    #Sleep/Mood  -Continue Melatonin 3mg at HS   -Continue Quetiapine 25mg at HS     #History of Pseudotumor Cerebri   -Outpatient follow up     #Sacral/Gluteal Cleft and Bilateral Buttocks Wounds  #Bilateral Foot Wounds  -Continue local wound care  -Off load pressure  -MVI, vitamin C and zinc   -Plastic consult pending - discussed with Dr. Hidalgo     #ELLA   -Baseline Cr appears to be ~0.8 range   -ELLA felt to be multifactorial in setting of cardiorenal w/ RVE, recurrent ELLA suspected due to vancomycin toxicity and diuresis   -Cr mild uptrend to 1.45 (4/28)  -Check PVR  -Encouraged oral hydration   -Continue Renvela 800mg TID, monitor phos level  -Low phos diet  -Monitor BMP, further management pending trend     #Type 2 Diabetes  -HbA1C 6.7 on 2/25, Repeat HbA1C 4.9 on 4/28  -FS trend reviewed, has been within goal  -RAISS discontinued  -Monitor glucose on routine lab     #GI PPx  -PPI    #DVT PPx  -On Eliquis    Discussed with rehab team

## 2025-04-28 NOTE — PROGRESS NOTE ADULT - SUBJECTIVE AND OBJECTIVE BOX
Subjective  Seen and examined  Reports good night rest   Tolerating oral diet     ROS--no acute pain, no fever or chest pain   B/l leg and sacral ulcers  LBM 4/27    Function       Vital Signs Last 24 Hrs  T(C): 36.8 (28 Apr 2025 11:15), Max: 36.8 (28 Apr 2025 11:15)  T(F): 98.3 (28 Apr 2025 11:15), Max: 98.3 (28 Apr 2025 11:15)  HR: 115 (28 Apr 2025 11:15) (110 - 115)  BP: 122/74 (28 Apr 2025 11:15) (119/69 - 128/80)  RR: 16 (28 Apr 2025 11:15) (16 - 16)  SpO2: 94% (28 Apr 2025 11:15) (94% - 97%)  O2 Parameters below as of 28 Apr 2025 11:15  Patient On (Oxygen Delivery Method): room air        EXAM   Gen - NAD, Comfortable, obese  HEENT - NCAT, EOMI, MMM  Neck - Supple, No limited ROM  Pulm - CTAB, No wheeze, No rhonchi, No crackles  Cardiovascular - RRR, S1S2, No murmurs  Abdomen - Soft, non tender, +BS  Extremities - chronic skin changes consistent with elephantitis, midline present LUE    Neuro-  AAO X 3, Speech is normal     Motor -                     LEFT    UE - ShAB 5/5, EF 5/5, EE 5/5, WE 5/5,  5/5                    RIGHT UE - ShAB 5/5, EF 5/5, EE 5/5, WE 5/5,  5/5                    LEFT    LE - HF 3/5, KE 2/5 limited by bed size and body habitus, DF 1/5, PF 5/5                    RIGHT LE - HF 3/5, KE 2/5 limited by bed size and body habitus , DF 1/5, PF 5/5        Sensory - Decreased to light touch bilateral lower extremities      Coordination - impaired lower extremities   Psychiatric - Mood stable, Affect WNL    Skin:  R foot plantar aspect lateral/distal side 7cm x 5.5cm ischemic wound from pressors  RLE 4th digit 1x1.5cm ischemic induced wound from pressors  RLE 5th digit 3x2cm ischemic induced wound from pressors  L foot plantar aspect lateral/distal side 6x7cm ischemic induced wound from pressor usage  LLE 4th digit 2x1cm ischemic induced wound from pressors  LLE 5th digit 2.5x3cm ischemic induced wound from pressors   R groin skin tear 1x1.5cm  Prior PEG insertion site 0.5x1cm   Former trach site 1x0.5cm  Unstageable pressure wound cocyx 7cmx4.5cm w/ 2.5cm depth  R buttock pressure induced wound semi contiguous with coccyx wound calling unstageable given prior debridement 8.5x5.5cm  L buttock 1.0x0.5cm stage 3 pressure injury  R buttock skin tear 3.5cm x 0.5cm and 1.5cmx0.5cm    RECENT LABS/IMAGING                        8.7    7.22  )-----------( 388      ( 28 Apr 2025 07:32 )             29.7     04-28    140  |  101  |  11  ----------------------------<  106[H]  4.0   |  29  |  1.45[H]    Ca    9.5      28 Apr 2025 07:32  Phos  5.1     04-28  Mg     1.7     04-28    TPro  7.7  /  Alb  2.3[L]  /  TBili  0.4  /  DBili  x   /  AST  22  /  ALT  24  /  AlkPhos  54  04-28      Urinalysis Basic - ( 28 Apr 2025 07:32 )    Color: x / Appearance: x / SG: x / pH: x  Gluc: 106 mg/dL / Ketone: x  / Bili: x / Urobili: x   Blood: x / Protein: x / Nitrite: x   Leuk Esterase: x / RBC: x / WBC x   Sq Epi: x / Non Sq Epi: x / Bacteria: x    MEDICATIONS  (STANDING):  amLODIPine   Tablet 10 milliGRAM(s) Oral daily  apixaban 5 milliGRAM(s) Oral two times a day  ascorbic acid 500 milliGRAM(s) Oral daily  bisacodyl 10 milliGRAM(s) Oral at bedtime  capsaicin 0.025% Cream 1 Application(s) Topical four times a day  clotrimazole 1% Cream 1 Application(s) Topical <User Schedule>  Dakins Solution - 1/4 Strength 1 Application(s) Topical two times a day  dextrose 5%. 1000 milliLiter(s) (100 mL/Hr) IV Continuous <Continuous>  dextrose 5%. 1000 milliLiter(s) (50 mL/Hr) IV Continuous <Continuous>  dextrose 50% Injectable 25 Gram(s) IV Push once  dextrose 50% Injectable 12.5 Gram(s) IV Push once  dextrose 50% Injectable 25 Gram(s) IV Push once  DULoxetine 60 milliGRAM(s) Oral daily  gabapentin 1200 milliGRAM(s) Oral every 8 hours  glucagon  Injectable 1 milliGRAM(s) IntraMuscular once  influenza   Vaccine 0.5 milliLiter(s) IntraMuscular once  lidocaine   4% Patch 1 Patch Transdermal daily  lidocaine   4% Patch 1 Patch Transdermal daily  melatonin 3 milliGRAM(s) Oral at bedtime  multivitamin 1 Tablet(s) Oral daily  mupirocin 2% Ointment 1 Application(s) Topical two times a day  naloxegol 25 milliGRAM(s) Oral daily  pantoprazole    Tablet 40 milliGRAM(s) Oral before breakfast  piperacillin/tazobactam IVPB.. 3.375 Gram(s) IV Intermittent every 8 hours  polyethylene glycol 3350 17 Gram(s) Oral two times a day  povidone iodine 10% Solution 1 Application(s) Topical daily  QUEtiapine 25 milliGRAM(s) Oral at bedtime  sevelamer carbonate 800 milliGRAM(s) Oral three times a day with meals  zinc sulfate 220 milliGRAM(s) Oral daily    MEDICATIONS  (PRN):  acetaminophen     Tablet .. 650 milliGRAM(s) Oral every 6 hours PRN Temp greater or equal to 38C (100.4F), Mild Pain (1 - 3), Moderate Pain (4 - 6)  albuterol/ipratropium for Nebulization 3 milliLiter(s) Nebulizer every 6 hours PRN Shortness of Breath and/or Wheezing  cyclobenzaprine 5 milliGRAM(s) Oral three times a day PRN Muscle Spasm  dextrose Oral Gel 15 Gram(s) Oral once PRN Blood Glucose LESS THAN 70 milliGRAM(s)/deciliter  oxyCODONE    IR 5 milliGRAM(s) Oral two times a day PRN Severe Pain (7 - 10) related to dressing change  oxyCODONE    IR 15 milliGRAM(s) Oral every 6 hours PRN Severe Pain (7 - 10)               Subjective  Seen and examined  Reports good night rest   Tolerating oral diet     ROS--no acute pain, no fever or chest pain   B/l leg and sacral ulcers  LBM 4/27    Function   Participated in lower extremity therex, AAROM bilaterally: internal/external  rotation, knee flexion, hip flexion/extension, gravity eliminated hip  abduction/adduction, SAQ 2 x 10 reps each.  Pain Reassessment  Patient currently without complaints of pain.    Patient Education  Education Provided to Patient:  Discussed with patient need for pressure relief cushion due to pressure injuries  buttocks/coccyx; and that cushion may also help relieve discomfort in sitting.    Mode of Patient Education: Explanation      Vital Signs Last 24 Hrs  T(C): 36.8 (28 Apr 2025 11:15), Max: 36.8 (28 Apr 2025 11:15)  T(F): 98.3 (28 Apr 2025 11:15), Max: 98.3 (28 Apr 2025 11:15)  HR: 115 (28 Apr 2025 11:15) (110 - 115)  BP: 122/74 (28 Apr 2025 11:15) (119/69 - 128/80)  RR: 16 (28 Apr 2025 11:15) (16 - 16)  SpO2: 94% (28 Apr 2025 11:15) (94% - 97%)  O2 Parameters below as of 28 Apr 2025 11:15  Patient On (Oxygen Delivery Method): room air        EXAM   Gen - NAD, Comfortable, obese  HEENT - NCAT, EOMI, MMM  Neck - Supple, No limited ROM  Pulm - CTAB, No wheeze, No rhonchi, No crackles  Cardiovascular - RRR, S1S2, No murmurs  Abdomen - Soft, non tender, +BS  Extremities - chronic skin changes consistent with elephantitis, midline present LUE    Neuro-  AAO X 3, Speech is normal     Motor -                     LEFT    UE - ShAB 5/5, EF 5/5, EE 5/5, WE 5/5,  5/5                    RIGHT UE - ShAB 5/5, EF 5/5, EE 5/5, WE 5/5,  5/5                    LEFT    LE - HF 3/5, KE 2/5 limited by bed size and body habitus, DF 1/5, PF 5/5                    RIGHT LE - HF 3/5, KE 2/5 limited by bed size and body habitus , DF 1/5, PF 5/5        Sensory - Decreased to light touch bilateral lower extremities      Coordination - impaired lower extremities   Psychiatric - Mood stable, Affect WNL    Skin:  R foot plantar aspect lateral/distal side 7cm x 5.5cm ischemic wound from pressors  RLE 4th digit 1x1.5cm ischemic induced wound from pressors  RLE 5th digit 3x2cm ischemic induced wound from pressors  L foot plantar aspect lateral/distal side 6x7cm ischemic induced wound from pressor usage  LLE 4th digit 2x1cm ischemic induced wound from pressors  LLE 5th digit 2.5x3cm ischemic induced wound from pressors   R groin skin tear 1x1.5cm  Prior PEG insertion site 0.5x1cm   Former trach site 1x0.5cm  Unstageable pressure wound cocyx 7cmx4.5cm w/ 2.5cm depth  R buttock pressure induced wound semi contiguous with coccyx wound calling unstageable given prior debridement 8.5x5.5cm  L buttock 1.0x0.5cm stage 3 pressure injury  R buttock skin tear 3.5cm x 0.5cm and 1.5cmx0.5cm    RECENT LABS/IMAGING                        8.7    7.22  )-----------( 388      ( 28 Apr 2025 07:32 )             29.7     04-28    140  |  101  |  11  ----------------------------<  106[H]  4.0   |  29  |  1.45[H]    Ca    9.5      28 Apr 2025 07:32  Phos  5.1     04-28  Mg     1.7     04-28    TPro  7.7  /  Alb  2.3[L]  /  TBili  0.4  /  DBili  x   /  AST  22  /  ALT  24  /  AlkPhos  54  04-28      Urinalysis Basic - ( 28 Apr 2025 07:32 )    Color: x / Appearance: x / SG: x / pH: x  Gluc: 106 mg/dL / Ketone: x  / Bili: x / Urobili: x   Blood: x / Protein: x / Nitrite: x   Leuk Esterase: x / RBC: x / WBC x   Sq Epi: x / Non Sq Epi: x / Bacteria: x    MEDICATIONS  (STANDING):  amLODIPine   Tablet 10 milliGRAM(s) Oral daily  apixaban 5 milliGRAM(s) Oral two times a day  ascorbic acid 500 milliGRAM(s) Oral daily  bisacodyl 10 milliGRAM(s) Oral at bedtime  capsaicin 0.025% Cream 1 Application(s) Topical four times a day  clotrimazole 1% Cream 1 Application(s) Topical <User Schedule>  Dakins Solution - 1/4 Strength 1 Application(s) Topical two times a day  dextrose 5%. 1000 milliLiter(s) (100 mL/Hr) IV Continuous <Continuous>  dextrose 5%. 1000 milliLiter(s) (50 mL/Hr) IV Continuous <Continuous>  dextrose 50% Injectable 25 Gram(s) IV Push once  dextrose 50% Injectable 12.5 Gram(s) IV Push once  dextrose 50% Injectable 25 Gram(s) IV Push once  DULoxetine 60 milliGRAM(s) Oral daily  gabapentin 1200 milliGRAM(s) Oral every 8 hours  glucagon  Injectable 1 milliGRAM(s) IntraMuscular once  influenza   Vaccine 0.5 milliLiter(s) IntraMuscular once  lidocaine   4% Patch 1 Patch Transdermal daily  lidocaine   4% Patch 1 Patch Transdermal daily  melatonin 3 milliGRAM(s) Oral at bedtime  multivitamin 1 Tablet(s) Oral daily  mupirocin 2% Ointment 1 Application(s) Topical two times a day  naloxegol 25 milliGRAM(s) Oral daily  pantoprazole    Tablet 40 milliGRAM(s) Oral before breakfast  piperacillin/tazobactam IVPB.. 3.375 Gram(s) IV Intermittent every 8 hours  polyethylene glycol 3350 17 Gram(s) Oral two times a day  povidone iodine 10% Solution 1 Application(s) Topical daily  QUEtiapine 25 milliGRAM(s) Oral at bedtime  sevelamer carbonate 800 milliGRAM(s) Oral three times a day with meals  zinc sulfate 220 milliGRAM(s) Oral daily    MEDICATIONS  (PRN):  acetaminophen     Tablet .. 650 milliGRAM(s) Oral every 6 hours PRN Temp greater or equal to 38C (100.4F), Mild Pain (1 - 3), Moderate Pain (4 - 6)  albuterol/ipratropium for Nebulization 3 milliLiter(s) Nebulizer every 6 hours PRN Shortness of Breath and/or Wheezing  cyclobenzaprine 5 milliGRAM(s) Oral three times a day PRN Muscle Spasm  dextrose Oral Gel 15 Gram(s) Oral once PRN Blood Glucose LESS THAN 70 milliGRAM(s)/deciliter  oxyCODONE    IR 5 milliGRAM(s) Oral two times a day PRN Severe Pain (7 - 10) related to dressing change  oxyCODONE    IR 15 milliGRAM(s) Oral every 6 hours PRN Severe Pain (7 - 10)

## 2025-04-28 NOTE — PROGRESS NOTE ADULT - ASSESSMENT
39 year old male with PMH of morbid obesity, BEA on CPAP, pAFIB (not on AC), HTN, and BL papilledema attributed to pseudotumor cerebri; who initially presented to  on 2/24 with SOB and BL LE edema. Found to have URI with progressive SOB and multifocal PNA on imaging. His hospital course was complicated by worsening respiratory distress and increased 02 demands secondary to secretions (requiring multiple intubation attempts) and eventual MICU admission. While in MICU, he 02 requirements continued despite optimal vent management. Concern noted for progressive ARDS, unable to prone given morbid obesity, and ultimately cannulated for vvECMO on 2/25 and transferred to [facility] for further management on 2/26.  His hospital course at [facility] was significant for the following: diuretic and ABX management, s/p trache and PEG (placed on 3/7- PEG since removed), RCU admission, high grade fevers (secondary to panniculitis), progressively worsening sacral ulcer (likely osteomyelitis- s/p surgical debridement), BL foot wound (secondary to pressor use), neuropathy (secondary to critical illness polyneuropathy), ELLA (secondary to vancomycin toxicity and overdiuresis- since DCd- with improvement). Patient now admitted for a multidisciplinary rehab program. 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 Pat dry. Cover with small silicone foam with border. Change every other day or prn if soiled     Sacral/gluteal cleft to bilateral buttocks- Irrigate deep cavity and tunneling with Dakins solution 1/4 strength using peribottle or flushes, cleanse wound and satellite ulcerations to b/l buttocks with NS, Dry well. Apply Liquid barrier film to periwound skin. Apply TRIAD barrier paste to isolated ulcer overlying left buttock,  Apply Aquacel hydrofiber sheet to right buttock, pack sacral/gluteal cleft including deep tunnels with Dakins 1/4 strength moistened kerlix, leave at least 2" out at end to ensure full removal of packing with subsequent dressing changes. Cover entire area (sacrum and right buttock) with abdominal pad and tegaderm. Change twice a day and prn if soiled/compromised. Once dressing is in place and perineal care is provided, clean remaining skin with perineal spray, apply TRIAD moisture barrier cream to exposed skin every shift and prn.    Bilateral abdominal pannus, bilateral groin: Cleanse with luke-warm soap and water, dry well. Apply clotrimazole cream daily, followed by Interdry textile sheeting, under intertriginous folds leaving 2 inches exposed at ends to wick, remove to wash & dry affected area, then replace. Individual sheeting may be used for up to 5 days unless soiled. Inspect skin daily.     - L foot wound- betadine to followed by 4x4 gauze, ABD pad, and matilde daily  - R foot wound- mupirocin BID     -Continue to offload pressure; bariatric low airloss support surface, turn and position per protocol with use of fluidized positioning devices, continue incontinence and moisture care per protocol and use of single breathable incontinence pads, continue to offload heels with fluidized positioning devices/Ochoa-Lock positioning device (PS# [id number]). Continue use of bariatric seat cushion (people soft #[id number]) and limit sit time- no more than 2 consecutive hours at any given time.   - Pressure injury/Skin: OOB to Chair, PT/OT   -consulting plastic surgery    #Neuropathy  - Cymbalta 60mg daily   - Gabapentin 1200mg TID     #Pain Mgmt   - Capsaicin cream to BL feet QID - Avoid use on damaged, broken, or irritated skin. Avoid occlusive dressing or heat   - Lidocaine patch to BL thighs   - PRN: Cyclobenzaprine 5mg TID; Oxycodone 5mg BID; Oxycodone 15mg daily (prior to dressing change); Tylenol 650mg QID     #GI/Bowel Mgmt   - Pantoprazole  - Bisacodyl 10mg at HS   - Miralax   - Naloxegal 25mg daily     #/Bladder Mgmt   #ELLA  - Renvela 800mg TID      #FEN --Morbid obesity (BMI > 40).  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 Holder Liaison with Family/Providers:    -----------------------------------------------  OUTPATIENT/FOLLOW UP:    Your Primary Care Provider,   Phone: (   )    -  Fax: (   )    -  Follow Up Time: 1 week    [facility],   [address] [place] [address]  Phone: [phone]  Fax: (   )    -  Follow Up Time:    Dr. Waterhouse  Podiatry  [phone]  Within 1 week   -----------------------------------------------    39 year old male with PMH of morbid obesity, BEA on CPAP, pAFIB (not on AC), HTN, and BL papilledema attributed to pseudotumor cerebri; who initially presented to  on 2/24 with SOB and BL LE edema. Found to have URI with progressive SOB and multifocal PNA on imaging. His hospital course was complicated by worsening respiratory distress and increased 02 demands secondary to secretions (requiring multiple intubation attempts) and eventual MICU admission. While in MICU, he 02 requirements continued despite optimal vent management. Concern noted for progressive ARDS, unable to prone given morbid obesity, and ultimately cannulated for vvECMO on 2/25 and transferred to [facility] for further management on 2/26.  His hospital course at [facility] was significant for the following: diuretic and ABX management, s/p trache and PEG (placed on 3/7- PEG since removed), RCU admission, high grade fevers (secondary to panniculitis), progressively worsening sacral ulcer (likely osteomyelitis- s/p surgical debridement), BL foot wound (secondary to pressor use), neuropathy (secondary to critical illness polyneuropathy), ELLA (secondary to vancomycin toxicity and overdiuresis- since DCd- with improvement). Patient now admitted for a multidisciplinary rehab program. 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 Pat dry. Cover with small silicone foam with border. Change every other day or prn if soiled     Sacral/gluteal cleft to bilateral buttocks- Irrigate deep cavity and tunneling with Dakins solution 1/4 strength using peribottle or flushes, cleanse wound and satellite ulcerations to b/l buttocks with NS, Dry well. Apply Liquid barrier film to periwound skin. Apply TRIAD barrier paste to isolated ulcer overlying left buttock,  Apply Aquacel hydrofiber sheet to right buttock, pack sacral/gluteal cleft including deep tunnels with Dakins 1/4 strength moistened kerlix, leave at least 2" out at end to ensure full removal of packing with subsequent dressing changes. Cover entire area (sacrum and right buttock) with abdominal pad and tegaderm. Change twice a day and prn if soiled/compromised. Once dressing is in place and perineal care is provided, clean remaining skin with perineal spray, apply TRIAD moisture barrier cream to exposed skin every shift and prn.    Bilateral abdominal pannus, bilateral groin: Cleanse with luke-warm soap and water, dry well. Apply clotrimazole cream daily, followed by Interdry textile sheeting, under intertriginous folds leaving 2 inches exposed at ends to wick, remove to wash & dry affected area, then replace. Individual sheeting may be used for up to 5 days unless soiled. Inspect skin daily.     - L foot wound- betadine to followed by 4x4 gauze, ABD pad, and matilde daily  - R foot wound- mupirocin BID     -Continue to offload pressure; bariatric low airloss support surface, turn and position per protocol with use of fluidized positioning devices, continue incontinence and moisture care per protocol and use of single breathable incontinence pads, continue to offload heels with fluidized positioning devices/Ochoa-Lock positioning device (PS# [id number]). Continue use of bariatric seat cushion (people soft #[id number]) and limit sit time- no more than 2 consecutive hours at any given time.   - Pressure injury/Skin: OOB to Chair, PT/OT   -consulting plastic surgery    #Neuropathy  - Cymbalta 60mg daily   - Gabapentin 1200mg TID     #Pain Mgmt   - Capsaicin cream to BL feet QID - Avoid use on damaged, broken, or irritated skin. Avoid occlusive dressing or heat   - Lidocaine patch to BL thighs   - PRN: Cyclobenzaprine 5mg TID; Oxycodone 5mg BID; Oxycodone 15mg daily (prior to dressing change); Tylenol 650mg QID     #GI/Bowel Mgmt   - Pantoprazole  - Bisacodyl 10mg at HS   - Miralax   - Naloxegal 25mg daily     #/Bladder Mgmt   #ELLA  - Renvela 800mg TID      #FEN --Morbid obesity (BMI > 40).  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 DYSONs Liaison with Family/Providers:    -----------------------------------------------  OUTPATIENT/FOLLOW UP:    Your Primary Care Provider,   Phone: (   )    -  Fax: (   )    -  Follow Up Time: 1 week    [facility],   [address] [place] [address]  Phone: [phone]  Fax: (   )    -  Follow Up Time:    Dr. Waterhouse  Podiatry  [phone]  Within 1 week   -----------------------------------------------

## 2025-04-28 NOTE — PROGRESS NOTE ADULT - SUBJECTIVE AND OBJECTIVE BOX
Interval History  Patient seen and examined at bedside. No acute events noted.  Patient denied any acute complaints this morning. Neuropathic pain is managed on current regimen.    ALLERGIES:  No Known Allergies    MEDICATIONS  (STANDING):  amLODIPine   Tablet 10 milliGRAM(s) Oral daily  apixaban 5 milliGRAM(s) Oral two times a day  ascorbic acid 500 milliGRAM(s) Oral daily  bisacodyl 10 milliGRAM(s) Oral at bedtime  capsaicin 0.025% Cream 1 Application(s) Topical four times a day  clotrimazole 1% Cream 1 Application(s) Topical <User Schedule>  Dakins Solution - 1/4 Strength 1 Application(s) Topical two times a day  dextrose 5%. 1000 milliLiter(s) (100 mL/Hr) IV Continuous <Continuous>  dextrose 5%. 1000 milliLiter(s) (50 mL/Hr) IV Continuous <Continuous>  dextrose 50% Injectable 25 Gram(s) IV Push once  dextrose 50% Injectable 12.5 Gram(s) IV Push once  dextrose 50% Injectable 25 Gram(s) IV Push once  DULoxetine 60 milliGRAM(s) Oral daily  gabapentin 1200 milliGRAM(s) Oral every 8 hours  glucagon  Injectable 1 milliGRAM(s) IntraMuscular once  influenza   Vaccine 0.5 milliLiter(s) IntraMuscular once  lidocaine   4% Patch 1 Patch Transdermal daily  lidocaine   4% Patch 1 Patch Transdermal daily  melatonin 3 milliGRAM(s) Oral at bedtime  multivitamin 1 Tablet(s) Oral daily  mupirocin 2% Ointment 1 Application(s) Topical two times a day  naloxegol 25 milliGRAM(s) Oral daily  pantoprazole    Tablet 40 milliGRAM(s) Oral before breakfast  piperacillin/tazobactam IVPB.. 3.375 Gram(s) IV Intermittent every 8 hours  polyethylene glycol 3350 17 Gram(s) Oral two times a day  povidone iodine 10% Solution 1 Application(s) Topical daily  QUEtiapine 25 milliGRAM(s) Oral at bedtime  sevelamer carbonate 800 milliGRAM(s) Oral three times a day with meals  zinc sulfate 220 milliGRAM(s) Oral daily    MEDICATIONS  (PRN):  acetaminophen     Tablet .. 650 milliGRAM(s) Oral every 6 hours PRN Temp greater or equal to 38C (100.4F), Mild Pain (1 - 3), Moderate Pain (4 - 6)  albuterol/ipratropium for Nebulization 3 milliLiter(s) Nebulizer every 6 hours PRN Shortness of Breath and/or Wheezing  cyclobenzaprine 5 milliGRAM(s) Oral three times a day PRN Muscle Spasm  dextrose Oral Gel 15 Gram(s) Oral once PRN Blood Glucose LESS THAN 70 milliGRAM(s)/deciliter  oxyCODONE    IR 5 milliGRAM(s) Oral two times a day PRN Severe Pain (7 - 10) related to dressing change  oxyCODONE    IR 15 milliGRAM(s) Oral every 6 hours PRN Severe Pain (7 - 10)    Vital Signs Last 24 Hrs  T(F): 98.3 (28 Apr 2025 11:15), Max: 98.3 (28 Apr 2025 11:15)  HR: 115 (28 Apr 2025 11:15) (110 - 115)  BP: 122/74 (28 Apr 2025 11:15) (119/69 - 128/80)  RR: 16 (28 Apr 2025 11:15) (16 - 16)  SpO2: 94% (28 Apr 2025 11:15) (94% - 97%)  I&O's Summary    27 Apr 2025 07:01  -  28 Apr 2025 07:00  --------------------------------------------------------  IN: 0 mL / OUT: 700 mL / NET: -700 mL    BMI (kg/m2): 58.6 (04-27-25 @ 10:03)    PHYSICAL EXAM:  GENERAL: NAD  HEENT: NCAT, trach stoma covered with gauze   CHEST/LUNG: Clear to percussion bilaterally; No rales, rhonchi, wheezing  HEART: Regular rate and rhythm; No murmurs  ABDOMEN: Soft, Nontender, Nondistended; Bowel sounds present, previous PEG insertion site covered with dressing   MUSCULOSKELETAL/EXTREMITIES:  2+ Peripheral Pulses, No LE edema  PSYCH: Appropriate affect  NEURO: Alert & Oriented x 3    I personally reviewed the below data/images/labs:    LABS:                        8.7    7.22  )-----------( 388      ( 28 Apr 2025 07:32 )             29.7       04-28    140  |  101  |  11  ----------------------------<  106  4.0   |  29  |  1.45    Ca    9.5      28 Apr 2025 07:32  Phos  5.1     04-28  Mg     1.7     04-28    TPro  7.7  /  Alb  2.3  /  TBili  0.4  /  DBili  x   /  AST  22  /  ALT  24  /  AlkPhos  54  04-28     03-08 Chol -- LDL -- HDL -- Trig 169 mg/dL    Urinalysis Basic - ( 28 Apr 2025 07:32 )    Color: x / Appearance: x / SG: x / pH: x  Gluc: 106 mg/dL / Ketone: x  / Bili: x / Urobili: x   Blood: x / Protein: x / Nitrite: x   Leuk Esterase: x / RBC: x / WBC x   Sq Epi: x / Non Sq Epi: x / Bacteria: x    COVID-19 PCR: NotDetec (04-25-25 @ 18:31)    Consultant(s) Notes Reviewed:   Care Discused with Consultants/Other Providers:  Imaging Personally Reviewed:

## 2025-04-29 DIAGNOSIS — G72.81 CRITICAL ILLNESS MYOPATHY: ICD-10-CM

## 2025-04-29 LAB
ANION GAP SERPL CALC-SCNC: 9 MMOL/L — SIGNIFICANT CHANGE UP (ref 5–17)
BUN SERPL-MCNC: 13 MG/DL — SIGNIFICANT CHANGE UP (ref 7–23)
CALCIUM SERPL-MCNC: 9.7 MG/DL — SIGNIFICANT CHANGE UP (ref 8.4–10.5)
CHLORIDE SERPL-SCNC: 102 MMOL/L — SIGNIFICANT CHANGE UP (ref 96–108)
CO2 SERPL-SCNC: 29 MMOL/L — SIGNIFICANT CHANGE UP (ref 22–31)
CREAT SERPL-MCNC: 1.16 MG/DL — SIGNIFICANT CHANGE UP (ref 0.5–1.3)
EGFR: 84 ML/MIN/1.73M2 — SIGNIFICANT CHANGE UP
EGFR: 84 ML/MIN/1.73M2 — SIGNIFICANT CHANGE UP
GLUCOSE SERPL-MCNC: 102 MG/DL — HIGH (ref 70–99)
POTASSIUM SERPL-MCNC: 3.7 MMOL/L — SIGNIFICANT CHANGE UP (ref 3.5–5.3)
POTASSIUM SERPL-SCNC: 3.7 MMOL/L — SIGNIFICANT CHANGE UP (ref 3.5–5.3)
SODIUM SERPL-SCNC: 140 MMOL/L — SIGNIFICANT CHANGE UP (ref 135–145)

## 2025-04-29 PROCEDURE — 99233 SBSQ HOSP IP/OBS HIGH 50: CPT

## 2025-04-29 PROCEDURE — 93970 EXTREMITY STUDY: CPT | Mod: 26

## 2025-04-29 PROCEDURE — 99232 SBSQ HOSP IP/OBS MODERATE 35: CPT

## 2025-04-29 RX ORDER — AMMONIUM LACTATE 12 %
1 LOTION (ML) TOPICAL
Refills: 0 | Status: DISCONTINUED | OUTPATIENT
Start: 2025-04-29 | End: 2025-06-13

## 2025-04-29 RX ADMIN — CYCLOBENZAPRINE HYDROCHLORIDE 5 MILLIGRAM(S): 15 CAPSULE, EXTENDED RELEASE ORAL at 15:01

## 2025-04-29 RX ADMIN — Medication 25 GRAM(S): at 07:00

## 2025-04-29 RX ADMIN — QUETIAPINE FUMARATE 25 MILLIGRAM(S): 25 TABLET ORAL at 00:45

## 2025-04-29 RX ADMIN — OXYCODONE HYDROCHLORIDE 15 MILLIGRAM(S): 30 TABLET ORAL at 15:01

## 2025-04-29 RX ADMIN — Medication 25 GRAM(S): at 13:29

## 2025-04-29 RX ADMIN — OXYCODONE HYDROCHLORIDE 15 MILLIGRAM(S): 30 TABLET ORAL at 01:06

## 2025-04-29 RX ADMIN — DULOXETINE 60 MILLIGRAM(S): 20 CAPSULE, DELAYED RELEASE ORAL at 12:39

## 2025-04-29 RX ADMIN — OXYCODONE HYDROCHLORIDE 15 MILLIGRAM(S): 30 TABLET ORAL at 22:24

## 2025-04-29 RX ADMIN — GABAPENTIN 1200 MILLIGRAM(S): 400 CAPSULE ORAL at 00:43

## 2025-04-29 RX ADMIN — AMLODIPINE BESYLATE 10 MILLIGRAM(S): 10 TABLET ORAL at 07:07

## 2025-04-29 RX ADMIN — LIDOCAINE HYDROCHLORIDE 1 PATCH: 20 JELLY TOPICAL at 12:38

## 2025-04-29 RX ADMIN — SODIUM HYPOCHLORITE 1 APPLICATION(S): 0.12 SOLUTION TOPICAL at 18:09

## 2025-04-29 RX ADMIN — SODIUM HYPOCHLORITE 1 APPLICATION(S): 0.12 SOLUTION TOPICAL at 00:42

## 2025-04-29 RX ADMIN — APIXABAN 5 MILLIGRAM(S): 2.5 TABLET, FILM COATED ORAL at 07:06

## 2025-04-29 RX ADMIN — Medication 1 APPLICATION(S): at 12:42

## 2025-04-29 RX ADMIN — OXYCODONE HYDROCHLORIDE 15 MILLIGRAM(S): 30 TABLET ORAL at 00:06

## 2025-04-29 RX ADMIN — Medication 1 APPLICATION(S): at 00:45

## 2025-04-29 RX ADMIN — OXYCODONE HYDROCHLORIDE 15 MILLIGRAM(S): 30 TABLET ORAL at 15:02

## 2025-04-29 RX ADMIN — Medication 40 MILLIGRAM(S): at 07:06

## 2025-04-29 RX ADMIN — Medication 1 TABLET(S): at 12:39

## 2025-04-29 RX ADMIN — GABAPENTIN 1200 MILLIGRAM(S): 400 CAPSULE ORAL at 07:05

## 2025-04-29 RX ADMIN — SEVELAMER HYDROCHLORIDE 800 MILLIGRAM(S): 800 TABLET ORAL at 12:38

## 2025-04-29 RX ADMIN — QUETIAPINE FUMARATE 25 MILLIGRAM(S): 25 TABLET ORAL at 22:37

## 2025-04-29 RX ADMIN — Medication 25 GRAM(S): at 22:24

## 2025-04-29 RX ADMIN — Medication 3 MILLIGRAM(S): at 22:38

## 2025-04-29 RX ADMIN — Medication 220 MILLIGRAM(S): at 12:39

## 2025-04-29 RX ADMIN — SEVELAMER HYDROCHLORIDE 800 MILLIGRAM(S): 800 TABLET ORAL at 09:17

## 2025-04-29 RX ADMIN — Medication 1 APPLICATION(S): at 00:47

## 2025-04-29 RX ADMIN — CYCLOBENZAPRINE HYDROCHLORIDE 5 MILLIGRAM(S): 15 CAPSULE, EXTENDED RELEASE ORAL at 01:10

## 2025-04-29 RX ADMIN — APIXABAN 5 MILLIGRAM(S): 2.5 TABLET, FILM COATED ORAL at 18:33

## 2025-04-29 RX ADMIN — NALOXEGOL OXALATE 25 MILLIGRAM(S): 12.5 TABLET, FILM COATED ORAL at 12:38

## 2025-04-29 RX ADMIN — SODIUM HYPOCHLORITE 1 APPLICATION(S): 0.12 SOLUTION TOPICAL at 07:08

## 2025-04-29 RX ADMIN — Medication 500 MILLIGRAM(S): at 12:39

## 2025-04-29 RX ADMIN — GABAPENTIN 1200 MILLIGRAM(S): 400 CAPSULE ORAL at 13:28

## 2025-04-29 RX ADMIN — CLOTRIMAZOLE 1 APPLICATION(S): 1 CREAM TOPICAL at 12:42

## 2025-04-29 RX ADMIN — LIDOCAINE HYDROCHLORIDE 1 PATCH: 20 JELLY TOPICAL at 12:37

## 2025-04-29 RX ADMIN — SEVELAMER HYDROCHLORIDE 800 MILLIGRAM(S): 800 TABLET ORAL at 18:33

## 2025-04-29 RX ADMIN — MUPIROCIN CALCIUM 1 APPLICATION(S): 20 CREAM TOPICAL at 18:38

## 2025-04-29 RX ADMIN — GABAPENTIN 1200 MILLIGRAM(S): 400 CAPSULE ORAL at 22:37

## 2025-04-29 RX ADMIN — Medication 1 APPLICATION(S): at 18:35

## 2025-04-29 RX ADMIN — Medication 3 MILLIGRAM(S): at 00:45

## 2025-04-29 RX ADMIN — MUPIROCIN CALCIUM 1 APPLICATION(S): 20 CREAM TOPICAL at 07:06

## 2025-04-29 RX ADMIN — OXYCODONE HYDROCHLORIDE 15 MILLIGRAM(S): 30 TABLET ORAL at 23:24

## 2025-04-29 RX ADMIN — Medication 1 APPLICATION(S): at 07:07

## 2025-04-29 NOTE — PROGRESS NOTE ADULT - SUBJECTIVE AND OBJECTIVE BOX
Subjective  Seen and examined at bedside this AM.  Girlfriend present.  Admits to sleeping well with use of cpap.   No complaints at this time.     ROS--no acute pain, no fever or chest pain   B/l leg and sacral ulcers  LBM 4/27    Function   Participated in lower extremity therex, AAROM bilaterally: internal/external  rotation, knee flexion, hip flexion/extension, gravity eliminated hip  abduction/adduction, SAQ 2 x 10 reps each.  Pain Reassessment  Patient currently without complaints of pain.    Patient Education  Education Provided to Patient:  Discussed with patient need for pressure relief cushion due to pressure injuries  buttocks/coccyx; and that cushion may also help relieve discomfort in sitting.    Mode of Patient Education: Explanation    Vital Signs Last 24 Hrs  T(C): 36.4 (29 Apr 2025 09:13), Max: 36.8 (28 Apr 2025 20:40)  T(F): 97.6 (29 Apr 2025 09:13), Max: 98.2 (28 Apr 2025 20:40)  HR: 111 (29 Apr 2025 09:13) (102 - 115)  BP: 116/72 (29 Apr 2025 09:13) (116/72 - 134/94)  BP(mean): --  RR: 16 (29 Apr 2025 09:13) (16 - 16)  SpO2: 94% (29 Apr 2025 09:13) (94% - 96%)      EXAM   Gen - NAD, Comfortable, obese  HEENT - NCAT, EOMI, MMM  Neck - Supple, No limited ROM  Pulm - CTAB, No wheeze, No rhonchi, No crackles  Cardiovascular - RRR, S1S2, No murmurs  Abdomen - Soft, non tender, +BS  Extremities - chronic skin changes consistent with elephantitis, midline present LUE    Neuro-  AAO X 3, Speech is normal     Motor -                     LEFT    UE - ShAB 5/5, EF 5/5, EE 5/5, WE 5/5,  5/5                    RIGHT UE - ShAB 5/5, EF 5/5, EE 5/5, WE 5/5,  5/5                    LEFT    LE - HF 3/5, KE 2/5 limited by bed size and body habitus, DF 1/5, PF 5/5                    RIGHT LE - HF 3/5, KE 2/5 limited by bed size and body habitus , DF 1/5, PF 5/5        Sensory - Decreased to light touch bilateral lower extremities      Coordination - impaired lower extremities   Psychiatric - Mood stable, Affect WNL    Skin:  R foot plantar aspect lateral/distal side 7cm x 5.5cm ischemic wound from pressors  RLE 4th digit 1x1.5cm ischemic induced wound from pressors  RLE 5th digit 3x2cm ischemic induced wound from pressors  L foot plantar aspect lateral/distal side 6x7cm ischemic induced wound from pressor usage  LLE 4th digit 2x1cm ischemic induced wound from pressors  LLE 5th digit 2.5x3cm ischemic induced wound from pressors   R groin skin tear 1x1.5cm  Prior PEG insertion site 0.5x1cm   Former trach site 1x0.5cm  Unstageable pressure wound cocyx 7cmx4.5cm w/ 2.5cm depth  R buttock pressure induced wound semi contiguous with coccyx wound calling unstageable given prior debridement 8.5x5.5cm  L buttock 1.0x0.5cm stage 3 pressure injury  R buttock skin tear 3.5cm x 0.5cm and 1.5cmx0.5cm    LABS:                        8.7    7.22  )-----------( 388      ( 28 Apr 2025 07:32 )             29.7     04-29    140  |  102  |  13  ----------------------------<  102[H]  3.7   |  29  |  1.16    Ca    9.7      29 Apr 2025 05:52  Phos  5.1     04-28  Mg     1.7     04-28    TPro  7.7  /  Alb  2.3[L]  /  TBili  0.4  /  DBili  x   /  AST  22  /  ALT  24  /  AlkPhos  54  04-28      Urinalysis Basic - ( 29 Apr 2025 05:52 )    Color: x / Appearance: x / SG: x / pH: x  Gluc: 102 mg/dL / Ketone: x  / Bili: x / Urobili: x   Blood: x / Protein: x / Nitrite: x   Leuk Esterase: x / RBC: x / WBC x   Sq Epi: x / Non Sq Epi: x / Bacteria: x        MEDICATIONS  (STANDING):  amLODIPine   Tablet 10 milliGRAM(s) Oral daily  ammonium lactate 12% Lotion 1 Application(s) Topical two times a day  apixaban 5 milliGRAM(s) Oral two times a day  ascorbic acid 500 milliGRAM(s) Oral daily  bisacodyl 10 milliGRAM(s) Oral at bedtime  capsaicin 0.025% Cream 1 Application(s) Topical four times a day  clotrimazole 1% Cream 1 Application(s) Topical <User Schedule>  Dakins Solution - 1/4 Strength 1 Application(s) Topical two times a day  dextrose 5%. 1000 milliLiter(s) (100 mL/Hr) IV Continuous <Continuous>  dextrose 5%. 1000 milliLiter(s) (50 mL/Hr) IV Continuous <Continuous>  dextrose 50% Injectable 25 Gram(s) IV Push once  dextrose 50% Injectable 12.5 Gram(s) IV Push once  dextrose 50% Injectable 25 Gram(s) IV Push once  DULoxetine 60 milliGRAM(s) Oral daily  gabapentin 1200 milliGRAM(s) Oral every 8 hours  glucagon  Injectable 1 milliGRAM(s) IntraMuscular once  influenza   Vaccine 0.5 milliLiter(s) IntraMuscular once  lidocaine   4% Patch 1 Patch Transdermal daily  lidocaine   4% Patch 1 Patch Transdermal daily  melatonin 3 milliGRAM(s) Oral at bedtime  multivitamin 1 Tablet(s) Oral daily  mupirocin 2% Ointment 1 Application(s) Topical two times a day  naloxegol 25 milliGRAM(s) Oral daily  pantoprazole    Tablet 40 milliGRAM(s) Oral before breakfast  piperacillin/tazobactam IVPB.. 3.375 Gram(s) IV Intermittent every 8 hours  polyethylene glycol 3350 17 Gram(s) Oral two times a day  povidone iodine 10% Solution 1 Application(s) Topical daily  QUEtiapine 25 milliGRAM(s) Oral at bedtime  sevelamer carbonate 800 milliGRAM(s) Oral three times a day with meals  zinc sulfate 220 milliGRAM(s) Oral daily    MEDICATIONS  (PRN):  acetaminophen     Tablet .. 650 milliGRAM(s) Oral every 6 hours PRN Temp greater or equal to 38C (100.4F), Mild Pain (1 - 3), Moderate Pain (4 - 6)  albuterol/ipratropium for Nebulization 3 milliLiter(s) Nebulizer every 6 hours PRN Shortness of Breath and/or Wheezing  cyclobenzaprine 5 milliGRAM(s) Oral three times a day PRN Muscle Spasm  dextrose Oral Gel 15 Gram(s) Oral once PRN Blood Glucose LESS THAN 70 milliGRAM(s)/deciliter  oxyCODONE    IR 5 milliGRAM(s) Oral two times a day PRN Severe Pain (7 - 10) related to dressing change  oxyCODONE    IR 15 milliGRAM(s) Oral every 6 hours PRN Severe Pain (7 - 10)   Subjective  Seen and examined at bedside this AM.  Girlfriend present.  Admits to sleeping well with use of cpap.   No complaints at this time.     ROS--no acute pain, no fever or chest pain   B/l leg and sacral ulcers  LBM 4/28    Function   Pt performed rolling in bed from side-to-side multi times with use of HR.  Pt Nette from bed to WC assist of 2  Pt performed  // bars by 3 trials from 15-30" strand Mod 1 and min of a  2nd (assist of2) Assist at ITs for hip ext and at b/l knee for block on stand    Therapeutic Exercise  Participated in lower extremity therex, AAROM bilaterally: internal/external  rotation, knee flexion, hip flexion/extension, gravity eliminated and PROM  supine with HOB elevated.      Vital Signs Last 24 Hrs  T(C): 36.4 (29 Apr 2025 09:13), Max: 36.8 (28 Apr 2025 20:40)  T(F): 97.6 (29 Apr 2025 09:13), Max: 98.2 (28 Apr 2025 20:40)  HR: 111 (29 Apr 2025 09:13) (102 - 115)  BP: 116/72 (29 Apr 2025 09:13) (116/72 - 134/94)  RR: 16 (29 Apr 2025 09:13) (16 - 16)  SpO2: 94% (29 Apr 2025 09:13) (94% - 96%)      EXAM   Gen - NAD, Comfortable, obese  HEENT - NCAT, EOMI, MMM  Neck - Supple, No limited ROM  Pulm - CTAB, No wheeze, No rhonchi, No crackles  Cardiovascular - RRR, S1S2, No murmurs  Abdomen - Soft, non tender, +BS  Extremities - chronic skin changes consistent with elephantiasis, midline present LUE  Tender calves during exam    Neuro-  AAO X 3, Speech is normal     Motor - UE 5/5                    LEFT    LE - HF 3/5, KE 2/5 limited by bed size and body habitus, DF 1/5, PF 5/5                    RIGHT LE - HF 3/5, KE 2/5 limited by bed size and body habitus , DF 1/5, PF 5/5        Sensory - Decreased to light touch bilateral lower extremities      Coordination - impaired lower extremities   Psychiatric - Mood stable, Affect WNL    Skin:  R foot plantar aspect lateral/distal side 7cm x 5.5cm ischemic wound from pressors  RLE 4th digit 1x1.5cm ischemic induced wound from pressors  RLE 5th digit 3x2cm ischemic induced wound from pressors  L foot plantar aspect lateral/distal side 6x7cm ischemic induced wound from pressor usage  LLE 4th digit 2x1cm ischemic induced wound from pressors  LLE 5th digit 2.5x3cm ischemic induced wound from pressors   R groin skin tear 1x1.5cm  Prior PEG insertion site 0.5x1cm   Former trach site 1x0.5cm  Unstageable pressure wound cocyx 7cmx4.5cm w/ 2.5cm depth  R buttock pressure induced wound semi contiguous with coccyx wound calling unstageable given prior debridement 8.5x5.5cm  L buttock 1.0x0.5cm stage 3 pressure injury  R buttock skin tear 3.5cm x 0.5cm and 1.5cmx0.5cm    LABS:                        8.7    7.22  )-----------( 388      ( 28 Apr 2025 07:32 )             29.7     04-29    140  |  102  |  13  ----------------------------<  102[H]  3.7   |  29  |  1.16    Ca    9.7      29 Apr 2025 05:52  Phos  5.1     04-28  Mg     1.7     04-28    TPro  7.7  /  Alb  2.3[L]  /  TBili  0.4  /  DBili  x   /  AST  22  /  ALT  24  /  AlkPhos  54  04-28      Urinalysis Basic - ( 29 Apr 2025 05:52 )    Color: x / Appearance: x / SG: x / pH: x  Gluc: 102 mg/dL / Ketone: x  / Bili: x / Urobili: x   Blood: x / Protein: x / Nitrite: x   Leuk Esterase: x / RBC: x / WBC x   Sq Epi: x / Non Sq Epi: x / Bacteria: x        MEDICATIONS  (STANDING):  amLODIPine   Tablet 10 milliGRAM(s) Oral daily  ammonium lactate 12% Lotion 1 Application(s) Topical two times a day  apixaban 5 milliGRAM(s) Oral two times a day  ascorbic acid 500 milliGRAM(s) Oral daily  bisacodyl 10 milliGRAM(s) Oral at bedtime  capsaicin 0.025% Cream 1 Application(s) Topical four times a day  clotrimazole 1% Cream 1 Application(s) Topical <User Schedule>  Dakins Solution - 1/4 Strength 1 Application(s) Topical two times a day  dextrose 5%. 1000 milliLiter(s) (100 mL/Hr) IV Continuous <Continuous>  dextrose 5%. 1000 milliLiter(s) (50 mL/Hr) IV Continuous <Continuous>  dextrose 50% Injectable 25 Gram(s) IV Push once  dextrose 50% Injectable 12.5 Gram(s) IV Push once  dextrose 50% Injectable 25 Gram(s) IV Push once  DULoxetine 60 milliGRAM(s) Oral daily  gabapentin 1200 milliGRAM(s) Oral every 8 hours  glucagon  Injectable 1 milliGRAM(s) IntraMuscular once  influenza   Vaccine 0.5 milliLiter(s) IntraMuscular once  lidocaine   4% Patch 1 Patch Transdermal daily  lidocaine   4% Patch 1 Patch Transdermal daily  melatonin 3 milliGRAM(s) Oral at bedtime  multivitamin 1 Tablet(s) Oral daily  mupirocin 2% Ointment 1 Application(s) Topical two times a day  naloxegol 25 milliGRAM(s) Oral daily  pantoprazole    Tablet 40 milliGRAM(s) Oral before breakfast  piperacillin/tazobactam IVPB.. 3.375 Gram(s) IV Intermittent every 8 hours  polyethylene glycol 3350 17 Gram(s) Oral two times a day  povidone iodine 10% Solution 1 Application(s) Topical daily  QUEtiapine 25 milliGRAM(s) Oral at bedtime  sevelamer carbonate 800 milliGRAM(s) Oral three times a day with meals  zinc sulfate 220 milliGRAM(s) Oral daily    MEDICATIONS  (PRN):  acetaminophen     Tablet .. 650 milliGRAM(s) Oral every 6 hours PRN Temp greater or equal to 38C (100.4F), Mild Pain (1 - 3), Moderate Pain (4 - 6)  albuterol/ipratropium for Nebulization 3 milliLiter(s) Nebulizer every 6 hours PRN Shortness of Breath and/or Wheezing  cyclobenzaprine 5 milliGRAM(s) Oral three times a day PRN Muscle Spasm  dextrose Oral Gel 15 Gram(s) Oral once PRN Blood Glucose LESS THAN 70 milliGRAM(s)/deciliter  oxyCODONE    IR 5 milliGRAM(s) Oral two times a day PRN Severe Pain (7 - 10) related to dressing change  oxyCODONE    IR 15 milliGRAM(s) Oral every 6 hours PRN Severe Pain (7 - 10)

## 2025-04-29 NOTE — PROGRESS NOTE ADULT - ASSESSMENT
39 year old male with PMH of morbid obesity, BEA on CPAP, pAFIB (not on AC), HTN, and BL papilledema attributed to pseudotumor cerebri; who initially presented to  on 2/24 with SOB and BL LE edema. Found to have URI with progressive SOB and multifocal PNA on imaging. His hospital course was complicated by worsening respiratory distress and increased 02 demands secondary to secretions (requiring multiple intubation attempts) and eventual MICU admission. While in MICU, he 02 requirements continued despite optimal vent management. Concern noted for progressive ARDS, unable to prone given morbid obesity, and ultimately cannulated for vvECMO on 2/25 and transferred to [facility] for further management on 2/26.  His hospital course at [facility] was significant for the following: diuretic and ABX management, s/p trache and PEG (placed on 3/7- PEG since removed), RCU admission, high grade fevers (secondary to panniculitis), progressively worsening sacral ulcer (likely osteomyelitis- s/p surgical debridement), BL foot wound (secondary to pressor use), neuropathy (secondary to critical illness polyneuropathy), ELLA (secondary to vancomycin toxicity and overdiuresis- since DCd- with improvement). Patient now admitted for a multidisciplinary rehab program. 04-25-25 @ 15:19    * ELLA - Cr improving   * Pastic surg consult for sacral wound and b/l leg ulcers  * Dry BL LEs - ammonium lactate  * Calf tenderness - Await BL LE doppler     #Critical illness myopathy due to respiratory failure and sepsis  (present on admission)   - Gait Instability, ADL impairments and Functional impairments: start Comprehensive Rehab Program of PT/OT  - 3 hours a day, 5 days a week   - PRN: Nebulizer QID     BEA on BIPAP  --via nasal canula qhs    #Sacral Osteomyelitis  - Zosyn TID - last dose evening of 5/11/25    #Paroxysmal AFIB  - Eliquis 5mg BID     #Calf Tenderness  - Await BL LE doppler     #HTN  - Amlodipine 10mg dialy     #Sleep/Mood  - Melatonin 3mg at HS   - Quetiapine 25mg at HS     #Skin  - LUQ surgical wound previous site of PEG tube and right groin- Clean wound and periwound skin with NS. Pat dry. Cover with small silicone foam with border. Change every other day or prn if soiled     Sacral/gluteal cleft to bilateral buttocks- Irrigate deep cavity and tunneling with Dakins solution 1/4 strength using peribottle or flushes, cleanse wound and satellite ulcerations to b/l buttocks with NS, Dry well. Apply Liquid barrier film to periwound skin. Apply TRIAD barrier paste to isolated ulcer overlying left buttock,  Apply Aquacel hydrofiber sheet to right buttock, pack sacral/gluteal cleft including deep tunnels with Dakins 1/4 strength moistened kerlix, leave at least 2" out at end to ensure full removal of packing with subsequent dressing changes. Cover entire area (sacrum and right buttock) with abdominal pad and tegaderm. Change twice a day and prn if soiled/compromised. Once dressing is in place and perineal care is provided, clean remaining skin with perineal spray, apply TRIAD moisture barrier cream to exposed skin every shift and prn.    Bilateral abdominal pannus, bilateral groin: Cleanse with luke-warm soap and water, dry well. Apply clotrimazole cream daily, followed by Interdry textile sheeting, under intertriginous folds leaving 2 inches exposed at ends to wick, remove to wash & dry affected area, then replace. Individual sheeting may be used for up to 5 days unless soiled. Inspect skin daily.     - L foot wound- betadine to followed by 4x4 gauze, ABD pad, and matilde daily  - R foot wound- mupirocin BID     -Continue to offload pressure; bariatric low airloss support surface, turn and position per protocol with use of fluidized positioning devices, continue incontinence and moisture care per protocol and use of single breathable incontinence pads, continue to offload heels with fluidized positioning devices/Ochoa-Lock positioning device (PS# [id number]). Continue use of bariatric seat cushion (people soft #[id number]) and limit sit time- no more than 2 consecutive hours at any given time.   - Pressure injury/Skin: OOB to Chair, PT/OT   -consulting plastic surgery  - Ammonium lactate to BL LEs     #Neuropathy  - Cymbalta 60mg daily   - Gabapentin 1200mg TID     #Pain Mgmt   - Capsaicin cream to BL feet QID - Avoid use on damaged, broken, or irritated skin. Avoid occlusive dressing or heat   - Lidocaine patch to BL thighs   - PRN: Cyclobenzaprine 5mg TID; Oxycodone 5mg BID; Oxycodone 15mg daily (prior to dressing change); Tylenol 650mg QID     #GI/Bowel Mgmt   - Pantoprazole  - Bisacodyl 10mg at HS   - Miralax   - Naloxegal 25mg daily     #/Bladder Mgmt   #ELLA  - Renvela 800mg TID  - Cr improving     #FEN --Morbid obesity (BMI > 40).  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 Holder Liaison with Family/Providers:    -----------------------------------------------  OUTPATIENT/FOLLOW UP:    Your Primary Care Provider,   Phone: (   )    -  Fax: (   )    -  Follow Up Time: 1 week    [facility] [place],   [address] [place] [address]  Phone: [phone]  Fax: (   )    -  Follow Up Time:    Dr. Waterhouse  Podiatry  [phone]  Within 1 week   -----------------------------------------------    39 year old male with PMH of morbid obesity, BEA on CPAP, pAFIB (not on AC), HTN, and BL papilledema attributed to pseudotumor cerebri; who initially presented to  on 2/24 with SOB and BL LE edema. Found to have URI with progressive SOB and multifocal PNA on imaging. His hospital course was complicated by worsening respiratory distress and increased 02 demands secondary to secretions (requiring multiple intubation attempts) and eventual MICU admission. While in MICU, he 02 requirements continued despite optimal vent management. Concern noted for progressive ARDS, unable to prone given morbid obesity, and ultimately cannulated for vvECMO on 2/25 and transferred to [facility] for further management on 2/26.  His hospital course at [facility] was significant for the following: diuretic and ABX management, s/p trache and PEG (placed on 3/7- PEG since removed), RCU admission, high grade fevers (secondary to panniculitis), progressively worsening sacral ulcer (likely osteomyelitis- s/p surgical debridement), BL foot wound (secondary to pressor use), neuropathy (secondary to critical illness polyneuropathy), ELLA (secondary to vancomycin toxicity and overdiuresis- since DCd- with improvement). Patient now admitted for a multidisciplinary rehab program. 04-25-25 @ 15:19    * ELLA - Cr improving   * Pastic surg consult for sacral wound and b/l leg ulcers  * Dry BL LEs - ammonium lactate  * Calf tenderness - Await BL LE doppler     #Critical illness myopathy due to respiratory failure and sepsis  (present on admission)   - Gait Instability, ADL impairments and Functional impairments: start Comprehensive Rehab Program of PT/OT  - 3 hours a day, 5 days a week   - PRN: Nebulizer QID     BEA on BIPAP  --via nasal canula qhs    #Sacral Osteomyelitis  - Zosyn TID - last dose evening of 5/11/25    #Paroxysmal AFIB  - Eliquis 5mg BID     #Calf Tenderness  - Await BL LE doppler     #HTN  - Amlodipine 10mg dialy     #Sleep/Mood  - Melatonin 3mg at HS   - Quetiapine 25mg at HS     #Skin  - LUQ surgical wound previous site of PEG tube and right groin- Clean wound and periwound skin with NS. Pat dry. Cover with small silicone foam with border. Change every other day or prn if soiled     Sacral/gluteal cleft to bilateral buttocks- Irrigate deep cavity and tunneling with Dakins solution 1/4 strength using peribottle or flushes, cleanse wound and satellite ulcerations to b/l buttocks with NS, Dry well. Apply Liquid barrier film to periwound skin. Apply TRIAD barrier paste to isolated ulcer overlying left buttock,  Apply Aquacel hydrofiber sheet to right buttock, pack sacral/gluteal cleft including deep tunnels with Dakins 1/4 strength moistened kerlix, leave at least 2" out at end to ensure full removal of packing with subsequent dressing changes. Cover entire area (sacrum and right buttock) with abdominal pad and tegaderm. Change twice a day and prn if soiled/compromised. Once dressing is in place and perineal care is provided, clean remaining skin with perineal spray, apply TRIAD moisture barrier cream to exposed skin every shift and prn.    Bilateral abdominal pannus, bilateral groin: Cleanse with luke-warm soap and water, dry well. Apply clotrimazole cream daily, followed by Interdry textile sheeting, under intertriginous folds leaving 2 inches exposed at ends to wick, remove to wash & dry affected area, then replace. Individual sheeting may be used for up to 5 days unless soiled. Inspect skin daily.     - L foot wound- betadine to followed by 4x4 gauze, ABD pad, and matilde daily  - R foot wound- mupirocin BID     -Continue to offload pressure; bariatric low airloss support surface, turn and position per protocol with use of fluidized positioning devices, continue incontinence and moisture care per protocol and use of single breathable incontinence pads, continue to offload heels with fluidized positioning devices/Ochoa-Lock positioning device (PS# [id number]). Continue use of bariatric seat cushion (people soft #[id number]) and limit sit time- no more than 2 consecutive hours at any given time.   - Pressure injury/Skin: OOB to Chair, PT/OT   -consulting plastic surgery  - Ammonium lactate to BL LEs     #Neuropathy  - Cymbalta 60mg daily   - Gabapentin 1200mg TID     #Pain Mgmt   - Capsaicin cream to BL feet QID - Avoid use on damaged, broken, or irritated skin. Avoid occlusive dressing or heat   - Lidocaine patch to BL thighs   - PRN: Cyclobenzaprine 5mg TID; Oxycodone 5mg BID; Oxycodone 15mg daily (prior to dressing change); Tylenol 650mg QID     #GI/Bowel Mgmt   - Pantoprazole  - Bisacodyl 10mg at HS   - Miralax   - Naloxegal 25mg daily     #/Bladder Mgmt   #ELLA  - Renvela 800mg TID  - Cr improving     #FEN --Morbid obesity (BMI > 40).  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 Holder Liaison with Family/Providers:    -----------------------------------------------  OUTPATIENT/FOLLOW UP:    Your Primary Care Provider,   Phone: (   )    -  Fax: (   )    -  Follow Up Time: 1 week    [facility] [place],   [address] [place] [address]  Phone: [phone]  Fax: (   )    -  Follow Up Time:    Dr. Waterhouse  Podiatry  [phone]  Within 1 week   -----------------------------------------------

## 2025-04-29 NOTE — CONSULT NOTE ADULT - SUBJECTIVE AND OBJECTIVE BOX
CC:  Patient is a 39y old  Male who presents with a chief complaint of Debility secondary to acute hypoxic respiratory failure.  Pelvic MR 4/11      HPI:  Sunny Quintanilla is a 39 year old male with PMH of morbid obesity, BEA on CPAP, pAFIB (not on AC), HTN, and BL papilledema attributed to pseudotumor cerebri; who initially presented to  on 2/24 with SOB and BL LE edema. Found to have URI with progressive SOB and multifocal PNA on imaging. His hospital course was complicated by worsening respiratory distress and increased 02 demands secondary to secretions (requiring multiple intubation attempts) and eventual MICU admission. While in MICU, he 02 requirements continued despite optimal vent management. Concern noted for progressive ARDS, unable to prone given morbid obesity, and ultimately cannulated for vvECMO on 2/25 and transferred to [facility] for further management on 2/26.    His hospital course at [facility] was significant for the following: diuretic and ABX management, s/p trache and PEG (placed on 3/7- PEG since removed), RCU admission, high grade fevers (secondary to panniculitis), progressively worsening sacral ulcer (likely osteomyelitis- s/p surgical debridement), BL foot wound (secondary to pressor use), neuropathy (secondary to critical illness polyneuropathy), ELLA (secondary to vancomycin toxicity and overdiuresis- since DCd- with improvement). PM&R was consulted and deemed him an appropriate candidate for IRF. He was medically stabilized and cleared for discharge to [facility].       PAST MEDICAL & SURGICAL HISTORY:  HTN (hypertension)      Atrial fibrillation  paroxysmal      Papilledema of both eyes      Chronic sinusitis      Morbid obesity      No significant past surgical history          Allergies    No Known Allergies    Intolerances        SOCIAL HISTORY          Smoking: Yes [ ]  No [ ]   ______pk yrs          ETOH  Yes [ ]  No [ ]  Social [ ]          DRUGS:  Yes [ ]  No [ ]  if so what______________    FAMILY HISTORY:      MEDICATIONS  (STANDING):  amLODIPine   Tablet 10 milliGRAM(s) Oral daily  ammonium lactate 12% Lotion 1 Application(s) Topical two times a day  apixaban 5 milliGRAM(s) Oral two times a day  ascorbic acid 500 milliGRAM(s) Oral daily  bisacodyl 10 milliGRAM(s) Oral at bedtime  capsaicin 0.025% Cream 1 Application(s) Topical four times a day  clotrimazole 1% Cream 1 Application(s) Topical <User Schedule>  Dakins Solution - 1/4 Strength 1 Application(s) Topical two times a day  dextrose 5%. 1000 milliLiter(s) (100 mL/Hr) IV Continuous <Continuous>  dextrose 5%. 1000 milliLiter(s) (50 mL/Hr) IV Continuous <Continuous>  dextrose 50% Injectable 25 Gram(s) IV Push once  dextrose 50% Injectable 12.5 Gram(s) IV Push once  dextrose 50% Injectable 25 Gram(s) IV Push once  DULoxetine 60 milliGRAM(s) Oral daily  gabapentin 1200 milliGRAM(s) Oral every 8 hours  glucagon  Injectable 1 milliGRAM(s) IntraMuscular once  influenza   Vaccine 0.5 milliLiter(s) IntraMuscular once  lidocaine   4% Patch 1 Patch Transdermal daily  lidocaine   4% Patch 1 Patch Transdermal daily  melatonin 3 milliGRAM(s) Oral at bedtime  multivitamin 1 Tablet(s) Oral daily  mupirocin 2% Ointment 1 Application(s) Topical two times a day  naloxegol 25 milliGRAM(s) Oral daily  pantoprazole    Tablet 40 milliGRAM(s) Oral before breakfast  piperacillin/tazobactam IVPB.. 3.375 Gram(s) IV Intermittent every 8 hours  polyethylene glycol 3350 17 Gram(s) Oral two times a day  povidone iodine 10% Solution 1 Application(s) Topical daily  QUEtiapine 25 milliGRAM(s) Oral at bedtime  sevelamer carbonate 800 milliGRAM(s) Oral three times a day with meals  zinc sulfate 220 milliGRAM(s) Oral daily    MEDICATIONS  (PRN):  acetaminophen     Tablet .. 650 milliGRAM(s) Oral every 6 hours PRN Temp greater or equal to 38C (100.4F), Mild Pain (1 - 3), Moderate Pain (4 - 6)  albuterol/ipratropium for Nebulization 3 milliLiter(s) Nebulizer every 6 hours PRN Shortness of Breath and/or Wheezing  cyclobenzaprine 5 milliGRAM(s) Oral three times a day PRN Muscle Spasm  dextrose Oral Gel 15 Gram(s) Oral once PRN Blood Glucose LESS THAN 70 milliGRAM(s)/deciliter  oxyCODONE    IR 5 milliGRAM(s) Oral two times a day PRN Severe Pain (7 - 10) related to dressing change  oxyCODONE    IR 15 milliGRAM(s) Oral every 6 hours PRN Severe Pain (7 - 10)         Review of systems:  General:  no fever or chills  Pulmonary:  no SOB  Cardiac:  denies chest pain, palpitations  GI:  no nausea, vomitting, or abddominal pain  :  no increase frequency, or burning  Heme:  no easy bruising with minor trauma  Musculoskeletal:  No history of back pain or trauma  Skin:  no history of rashes, injury, trauma  Neuro:    weakness         Vital Signs Last 24 Hrs  T(C): 36.4 (29 Apr 2025 09:13), Max: 36.8 (28 Apr 2025 20:40)  T(F): 97.6 (29 Apr 2025 09:13), Max: 98.2 (28 Apr 2025 20:40)  HR: 111 (29 Apr 2025 09:13) (102 - 115)  BP: 116/72 (29 Apr 2025 09:13) (116/72 - 134/94)  BP(mean): --  RR: 16 (29 Apr 2025 09:13) (16 - 16)  SpO2: 94% (29 Apr 2025 09:13) (94% - 96%)    Parameters below as of 29 Apr 2025 09:13  Patient On (Oxygen Delivery Method): room air        Physical Exam:    General:  MO, no distress     Skin:  7.5 cm x 5.5 cm hypergranulated wound right buttock.  Separately 6.5 cm x 2 cm x 8 cm sacral defect with slough at base of wound, moderate drainage, wound packed with pieces of alginate (explained to keep packing as singe unit using stacking technique).  deep sacral wound, no bone palpated   HEENT - NCAT, EOMI, MMM  Neck - Supple, No limited ROM  Pulm - CTAB, No wheeze, No rhonchi, No crackles  Cardiovascular - RRR, S1S2, No murmurs  Abdomen - Soft, non tender, +BS  Extremities - chronic skin changes consistent with elephantitis, midline present LUE    Neuro-  AAO X 3, Speech is normal       LABS:                        8.7    7.22  )-----------( 388      ( 28 Apr 2025 07:32 )             29.7     04-29    140  |  102  |  13  ----------------------------<  102[H]  3.7   |  29  |  1.16    Ca    9.7      29 Apr 2025 05:52  Phos  5.1     04-28  Mg     1.7     04-28    TPro  7.7  /  Alb  2.3[L]  /  TBili  0.4  /  DBili  x   /  AST  22  /  ALT  24  /  AlkPhos  54  04-28      Urinalysis Basic - ( 29 Apr 2025 05:52 )    Color: x / Appearance: x / SG: x / pH: x  Gluc: 102 mg/dL / Ketone: x  / Bili: x / Urobili: x   Blood: x / Protein: x / Nitrite: x   Leuk Esterase: x / RBC: x / WBC x   Sq Epi: x / Non Sq Epi: x / Bacteria: x        RADIOLOGY & ADDITIONAL STUDIES:  ACC: [id number] EXAM:  MR PELVIS BONY ONLY WAW IC   ORDERED BY: FELICIANO CISNEROS     PROCEDURE DATE:  04/11/2025          INTERPRETATION:  MR PELVIS BONY WITHOUT AND WITH IV CONTRAST    HISTORY: Evaluate for osteomyelitis. Right iliac activity on recent bone   scan.    TECHNIQUE:  Multiplanar MRI of the pelvis was performed without and with   10 cc administered Gadavist intravenous contrast.    COMPARISON:  Nuclear medicine bone scan dated 4/8/2025. CT abdomen and   pelvis dated 4/3/2025.    FINDINGS:    OSSEOUS STRUCTURES    Fractures/Stress Reactions:  None.    Osteomyelitis: None.    VISUALIZED SPINE  S1 is transitional and lumbarized. Moderate L5-S1 degenerative disc   disease.    PELVIC JOINTS    Sacroiliac Joints:  Preserved.    Symphysis Pubis:  Preserved.    RIGHT HIP JOINT    Avascular Necrosis:  None.    Articular Cartilage:  Grossly preserved given the large field of view.    Effusion/Synovitis:  Small effusion with mild synovitis.    RIGHT HIP TENDONS    Gluteus Minimus Tendon:  Intact.    Gluteus Medius Tendon:  Mild to moderate tendinopathy.    Iliopsoas Tendon:  Intact.    Common Hamstring Tendon:  Intact.    Bursa:  None.    LEFT HIP JOINT    Avascular Necrosis:  None.    Articular Cartilage:  Grossly preserved given the large field of view.    Effusion/Synovitis:  Small effusion with mild synovitis.    LEFT HIP TENDONS    Gluteus Minimus Tendon:  Intact.    Gluteus Medius Tendon:  Mild to moderate tendinopathy.    Iliopsoas Tendon:  Intact.    Common Hamstring Tendon:  Intact.    Bursa:  None.    SOFT TISSUES    Pelvis/Abdomen:  Moderate fecal distention of the visualized sigmoid   colon.    Musculature:  Moderate to severe generalized edema of the bilateral   gluteal musculature. Mild lower paraspinal, adductor, and quadriceps   muscle edema. Sacral decubitus wound with region of decreased enhancement   of the right gluteus zurdo measuring up to 11.9 cm transversely. Tract   with small amount of fluid and gas is again seen extending superiorly   within the right gluteal subcutaneous tissues to the level of L5.    Subcutaneous Tissues:  Mild gluteal edema around the sacral wound with   the tract extending superiorly to the level of L5.    NEUROVASCULAR STRUCTURES    Sacral Neuroforamina:  Widely patent.    Neural Plexuses:  Maintained.    IMPRESSION:  1.  No osteomyelitis.  2.  Sacral decubitus wound is again seen extending superiorly along the   superficial margin of the right gluteus zurdo to the level of L5 with   small amount of fluid and gas within the tract.  3.  The underlying gluteus zurdo demonstrates a region of decreased   enhancement consistent with ischemia or necrosis measuring up to 11.9 cm   transversely.  4.  Muscle edema suggests a nonspecific myositis.  5.  Small bilateral hip joint effusions with mild synovitis is   nonspecific.    --- End of Report ---            LIZA JC MD; Attending Radiologist  This document has been electronically signed. Apr 11 2025  4:09PM    Risks, benefits, and alternatives to treatment discussed. All questions answered with understanding.    Procedure Performed:  (  )Yes     (  )No  Name of Procedure:      [  ]Debridement     [  ]I&D    [  ]Laceration Repair     [  ]Other:  (  )partial thickness     (  )full thickness     (  )subcutaneous     (  )muscle/tendon     (  )bone  (  )sharp     (  )surgical

## 2025-04-29 NOTE — PROGRESS NOTE ADULT - ASSESSMENT
39 year old male with PMH of morbid obesity, BEA, pAFIB (not on AC), HTN, and BL papilledema attributed to pseudotumor cerebri; who initially presented to  on 2/24 with SOB and BL LE edema. Found to have URI with progressive SOB and multifocal PNA on imaging. His hospital course was complicated by worsening respiratory distress and increased 02 demands secondary to secretions (requiring multiple intubation attempts) and eventual MICU admission. While in MICU, he 02 requirements continued despite optimal vent management. Concern noted for progressive ARDS, unable to prone given morbid obesity, and ultimately cannulated for vvECMO on 2/25 and transferred to [facility] for further management on 2/26. His hospital course at [facility] was significant for the following: diuretic and ABX management, s/p trach and PEG (placed on 3/7- PEG since removed), RCU admission, high grade fevers (secondary to panniculitis), progressively worsening sacral ulcer (likely osteomyelitis- s/p surgical debridement), BL foot wound (secondary to pressor use), neuropathy (secondary to critical illness polyneuropathy), ELLA (secondary to vancomycin toxicity and overdiuresis- since DCd- with improvement). Patient now admitted for a multidisciplinary rehab program. 04-25-25 @ 15:19    #Debility Secondary to Acute Hypoxic Respiratory Failure/ARDS 2/2 PNA  #BEA (Not on CPAP)  #Critical Illness Polyneuropathy   -s/p VV ECMO, s/p diuresis, TTE/ROBERT with RV failure, s/p treatment for pneumonia  -Repeat Echo 3/11 showed EF 66 %. RV is not well visualized, enlarged RV cavity size a/ reduced RV systolic function. No significant valvular disease.  No echocardiographic evidence of pulmonary hypertension.  -s/p Trach (decannulated 3/28) and PEG (removed 4/15)   -Saturating well on RA  -Continue duoneb PRN   -BIPAP QHS (FiO2 40%, 18/10)   -Fall precaution  -Pain control and bowel regimen per rehab team  -Comprehensive rehab program with PT/OT/SLP  -Outpatient pulm follow up     #Possible Sacral Osteomyelitis  -Continue Zosyn TID - last dose evening of 5/11/25    #Fungemia 2/2 Panniculitis  -Blood culture 3/15 and 3/16 with candida albicans  -s/p course of antifungals   -Repeat cultures negative since 3/18    #Chronic Paroxysmal AFIB  #Sinus Tachycardia  -Eliquis 5mg BID   -Not on AVN blocker   -EKG 4/28 showed sinus tachycardia, no acute acute ST/T wave changes  -TSH WNL 2/25  -Sinus tachycardia likely multifactorial including pain and deconditioning. Low suspicion for PE, no SOB/hypoxia and he is already on full AC    #RIJ and Bilateral LE DVT  -Duplex RUE 3/14 showed Acute, non-occlusive deep vein thrombosis noted within the right internal jugular vein. Superficial vein thrombosis noted within the right antecubital cephalic vein.  -Duplex LE 3/14 showed Acute, occlusive deep vein thromboses noted within the right and left peroneal veins at both mid calves. Age-indeterminate, occlusive deep vein thrombosis visualized within the left gastrocnemius vein at the proximal calf.  -Continue eliquis  -Follow up repeat duplex 4/29    #Normocytic Anemia   -Likely multifactorial  -H/H stable, no evidence of active bleed   -Monitor CBC     #HTN  -Continue Amlodipine  -Monitor BP     #Hypokalemia (Resolved)  -s/p repletion  -Monitor BMP    #Sleep/Mood  -Continue Melatonin 3mg at HS   -Continue Quetiapine 25mg at HS     #History of Pseudotumor Cerebri   -Outpatient follow up     #Stage IV Sacral Decub Ulcer  #Bilateral Buttocks Wounds  #Bilateral Foot Wounds  -Continue local wound care  -Off load pressure  -MVI, vitamin C and zinc   -Plastic consult pending - discussed with Dr. Hidalgo 4/28    #ELLA (Improved)  -Baseline Cr appears to be ~0.8 range   -ELLA felt to be multifactorial in setting of cardiorenal w/ RVE, recurrent ELLA suspected due to vancomycin toxicity and diuresis   -Cr mild uptrend to 1.45 (4/28) -> 1.16 on 4/29  -Continue to encourage oral hydration   -Continue Renvela 800mg TID, monitor phos level  -Low phos diet  -Monitor BMP    #Type 2 Diabetes  -HbA1C 6.7 on 2/25, Repeat HbA1C 4.9 on 4/28  -FS trend reviewed, has been within goal  -RAISS discontinued  -Monitor glucose on routine lab, controlled     #GI PPx  -PPI    #DVT PPx  -On Eliquis    Discussed with rehab team    39 year old male with PMH of morbid obesity, BEA, pAFIB (not on AC), HTN, and BL papilledema attributed to pseudotumor cerebri; who initially presented to  on 2/24 with SOB and BL LE edema. Found to have URI with progressive SOB and multifocal PNA on imaging. His hospital course was complicated by worsening respiratory distress and increased 02 demands secondary to secretions (requiring multiple intubation attempts) and eventual MICU admission. While in MICU, he 02 requirements continued despite optimal vent management. Concern noted for progressive ARDS, unable to prone given morbid obesity, and ultimately cannulated for vvECMO on 2/25 and [facility] management on 2/26. His hospital course at [facility] was significant for the following: diuretic and ABX management, s/p trach and PEG (placed on 3/7- PEG since removed), RCU admission, high grade fevers (secondary to panniculitis), progressively worsening sacral ulcer (likely osteomyelitis- s/p surgical debridement), BL foot wound (secondary to pressor use), neuropathy (secondary to critical illness polyneuropathy), ELLA (secondary to vancomycin toxicity and overdiuresis- since DCd- with improvement). Patient now admitted for a multidisciplinary rehab program. 04-25-25 @ 15:19    #Debility Secondary to Acute Hypoxic Respiratory Failure/ARDS 2/2 PNA  #BEA (Not on CPAP)  #Critical Illness Polyneuropathy   -s/p VV ECMO, s/p diuresis, TTE/ROBERT with RV failure, s/p treatment for pneumonia  -Repeat Echo 3/11 showed EF 66 %. RV is not well visualized, enlarged RV cavity size a/ reduced RV systolic function. No significant valvular disease.  No echocardiographic evidence of pulmonary hypertension.  -s/p Trach (decannulated 3/28) and PEG (removed 4/15)   -Saturating well on RA  -Continue duoneb PRN   -BIPAP QHS (FiO2 40%, 18/10)   -Fall precaution  -Pain control and bowel regimen per rehab team  -Comprehensive rehab program with PT/OT/SLP  -Outpatient pulm follow up     #Possible Sacral Osteomyelitis  -Continue Zosyn TID - last dose evening of 5/11/25    #Fungemia 2/2 Panniculitis  -Blood culture 3/15 and 3/16 with candida albicans  -s/p course of antifungals   -Repeat cultures negative since 3/18    #Chronic Paroxysmal AFIB  #Sinus Tachycardia  -Eliquis 5mg BID   -Not on AVN blocker   -EKG 4/28 showed sinus tachycardia, no acute acute ST/T wave changes  -TSH WNL 2/25  -Sinus tachycardia likely multifactorial including pain and deconditioning. Low suspicion for PE, no SOB/hypoxia and he is already on full AC    #RIJ and Bilateral LE DVT  -Duplex RUE 3/14 showed Acute, non-occlusive deep vein thrombosis noted within the right internal jugular vein. Superficial vein thrombosis noted within the right antecubital cephalic vein.  -Duplex LE 3/14 showed Acute, occlusive deep vein thromboses noted within the right and left peroneal veins at both mid calves. Age-indeterminate, occlusive deep vein thrombosis visualized within the left gastrocnemius vein at the proximal calf.  -Continue eliquis  -Follow up repeat duplex 4/29    #Normocytic Anemia   -Likely multifactorial  -H/H stable, no evidence of active bleed   -Monitor CBC     #HTN  -Continue Amlodipine  -Monitor BP     #Hypokalemia (Resolved)  -s/p repletion  -Monitor BMP    #Sleep/Mood  -Continue Melatonin 3mg at HS   -Continue Quetiapine 25mg at HS     #History of Pseudotumor Cerebri   -Outpatient follow up     #Stage IV Sacral Decub Ulcer  #Bilateral Buttocks Wounds  #Bilateral Foot Wounds  -Continue local wound care  -Off load pressure  -MVI, vitamin C and zinc   -Plastic consult following     #ELLA (Improved)  -Baseline Cr appears to be ~0.8 range   -ELLA felt to be multifactorial in setting of cardiorenal w/ RVE, recurrent ELLA suspected due to vancomycin toxicity and diuresis   -Cr mild uptrend to 1.45 (4/28) -> 1.16 on 4/29  -Continue to encourage oral hydration   -Continue Renvela 800mg TID, monitor phos level  -Low phos diet  -Monitor BMP    #Type 2 Diabetes  -HbA1C 6.7 on 2/25, Repeat HbA1C 4.9 on 4/28  -FS trend reviewed, has been within goal  -RAISS discontinued  -Monitor glucose on routine lab, controlled     #GI PPx  -PPI    #DVT PPx  -On Eliquis    Discussed with rehab team

## 2025-04-29 NOTE — PROGRESS NOTE ADULT - SUBJECTIVE AND OBJECTIVE BOX
Interval History  Patient seen and examined at bedside. No acute events noted.  Patient denies any acute complaints this morning. Lower extremity neuropathy managed on current regimen.     ALLERGIES:  No Known Allergies    MEDICATIONS  (STANDING):  amLODIPine   Tablet 10 milliGRAM(s) Oral daily  ammonium lactate 12% Lotion 1 Application(s) Topical two times a day  apixaban 5 milliGRAM(s) Oral two times a day  ascorbic acid 500 milliGRAM(s) Oral daily  bisacodyl 10 milliGRAM(s) Oral at bedtime  capsaicin 0.025% Cream 1 Application(s) Topical four times a day  clotrimazole 1% Cream 1 Application(s) Topical <User Schedule>  Dakins Solution - 1/4 Strength 1 Application(s) Topical two times a day  dextrose 5%. 1000 milliLiter(s) (100 mL/Hr) IV Continuous <Continuous>  dextrose 5%. 1000 milliLiter(s) (50 mL/Hr) IV Continuous <Continuous>  dextrose 50% Injectable 25 Gram(s) IV Push once  dextrose 50% Injectable 12.5 Gram(s) IV Push once  dextrose 50% Injectable 25 Gram(s) IV Push once  DULoxetine 60 milliGRAM(s) Oral daily  gabapentin 1200 milliGRAM(s) Oral every 8 hours  glucagon  Injectable 1 milliGRAM(s) IntraMuscular once  influenza   Vaccine 0.5 milliLiter(s) IntraMuscular once  lidocaine   4% Patch 1 Patch Transdermal daily  lidocaine   4% Patch 1 Patch Transdermal daily  melatonin 3 milliGRAM(s) Oral at bedtime  multivitamin 1 Tablet(s) Oral daily  mupirocin 2% Ointment 1 Application(s) Topical two times a day  naloxegol 25 milliGRAM(s) Oral daily  pantoprazole    Tablet 40 milliGRAM(s) Oral before breakfast  piperacillin/tazobactam IVPB.. 3.375 Gram(s) IV Intermittent every 8 hours  polyethylene glycol 3350 17 Gram(s) Oral two times a day  povidone iodine 10% Solution 1 Application(s) Topical daily  QUEtiapine 25 milliGRAM(s) Oral at bedtime  sevelamer carbonate 800 milliGRAM(s) Oral three times a day with meals  zinc sulfate 220 milliGRAM(s) Oral daily    MEDICATIONS  (PRN):  acetaminophen     Tablet .. 650 milliGRAM(s) Oral every 6 hours PRN Temp greater or equal to 38C (100.4F), Mild Pain (1 - 3), Moderate Pain (4 - 6)  albuterol/ipratropium for Nebulization 3 milliLiter(s) Nebulizer every 6 hours PRN Shortness of Breath and/or Wheezing  cyclobenzaprine 5 milliGRAM(s) Oral three times a day PRN Muscle Spasm  dextrose Oral Gel 15 Gram(s) Oral once PRN Blood Glucose LESS THAN 70 milliGRAM(s)/deciliter  oxyCODONE    IR 5 milliGRAM(s) Oral two times a day PRN Severe Pain (7 - 10) related to dressing change  oxyCODONE    IR 15 milliGRAM(s) Oral every 6 hours PRN Severe Pain (7 - 10)    Vital Signs Last 24 Hrs  T(F): 97.6 (29 Apr 2025 09:13), Max: 98.2 (28 Apr 2025 20:40)  HR: 111 (29 Apr 2025 09:13) (102 - 115)  BP: 116/72 (29 Apr 2025 09:13) (116/72 - 134/94)  RR: 16 (29 Apr 2025 09:13) (16 - 16)  SpO2: 94% (29 Apr 2025 09:13) (94% - 96%)  I&O's Summary    28 Apr 2025 07:01  -  29 Apr 2025 07:00  --------------------------------------------------------  IN: 0 mL / OUT: 900 mL / NET: -900 mL    BMI (kg/m2): 58.6 (04-29-25 @ 14:12)    PHYSICAL EXAM:  GENERAL: NAD  HEENT: NCAT, trach stoma covered with gauze   CHEST/LUNG: Clear to percussion bilaterally; No rales, rhonchi, wheezing  HEART: Regular rate and rhythm; No murmurs  ABDOMEN: Soft, Nontender, Nondistended; Bowel sounds present, previous PEG insertion site covered with dressing   MUSCULOSKELETAL/EXTREMITIES:  2+ Peripheral Pulses, No LE edema  PSYCH: Appropriate affect  NEURO: Alert & Oriented x 3    I personally reviewed the below data/images/labs:    LABS:                        8.7    7.22  )-----------( 388      ( 28 Apr 2025 07:32 )             29.7       04-29    140  |  102  |  13  ----------------------------<  102  3.7   |  29  |  1.16    Ca    9.7      29 Apr 2025 05:52  Phos  5.1     04-28  Mg     1.7     04-28    TPro  7.7  /  Alb  2.3  /  TBili  0.4  /  DBili  x   /  AST  22  /  ALT  24  /  AlkPhos  54  04-28     03-08 Chol -- LDL -- HDL -- Trig 169 mg/dL    Urinalysis Basic - ( 29 Apr 2025 05:52 )    Color: x / Appearance: x / SG: x / pH: x  Gluc: 102 mg/dL / Ketone: x  / Bili: x / Urobili: x   Blood: x / Protein: x / Nitrite: x   Leuk Esterase: x / RBC: x / WBC x   Sq Epi: x / Non Sq Epi: x / Bacteria: x    COVID-19 PCR: Gudeliateyusra (04-25-25 @ 18:31)    Consultant(s) Notes Reviewed:   Care Discused with Consultants/Other Providers:  Imaging Personally Reviewed:

## 2025-04-29 NOTE — CONSULT NOTE ADULT - ASSESSMENT
4/11 MRI, 4/16 surgical debridement to healthy tissue at [facility], now has moderate size defect with slough and drainage (stage 4) sacrum, also superficial hypergranular wound right buttock (stg 3).  Due to degree of slough, sacral wound may benefit from daily application of collagenase, also superficial right buttock wound does not require vac therapy and risks sacral vac leakage.  -hold off vac therapy until slough sacral wound is improved  -treat sacral wound with daily and prn wound care:  normal saline irrigation, remove excess moisture, apply santyl collagenase ointment to base of sacral wound, pack wound with large piece of calcium alginate, apply small piece of calcium alginate piece to right buttock wound, protect periwound with cavallon skin prep, and cover with large foam /allevyn dressing  -off load  -nutrition support

## 2025-04-30 ENCOUNTER — NON-APPOINTMENT (OUTPATIENT)
Age: 40
End: 2025-04-30

## 2025-04-30 PROCEDURE — 99232 SBSQ HOSP IP/OBS MODERATE 35: CPT

## 2025-04-30 RX ADMIN — Medication 25 GRAM(S): at 06:41

## 2025-04-30 RX ADMIN — MUPIROCIN CALCIUM 1 APPLICATION(S): 20 CREAM TOPICAL at 06:49

## 2025-04-30 RX ADMIN — SEVELAMER HYDROCHLORIDE 800 MILLIGRAM(S): 800 TABLET ORAL at 16:53

## 2025-04-30 RX ADMIN — GABAPENTIN 1200 MILLIGRAM(S): 400 CAPSULE ORAL at 06:45

## 2025-04-30 RX ADMIN — MUPIROCIN CALCIUM 1 APPLICATION(S): 20 CREAM TOPICAL at 16:51

## 2025-04-30 RX ADMIN — GABAPENTIN 1200 MILLIGRAM(S): 400 CAPSULE ORAL at 14:00

## 2025-04-30 RX ADMIN — CYCLOBENZAPRINE HYDROCHLORIDE 5 MILLIGRAM(S): 15 CAPSULE, EXTENDED RELEASE ORAL at 16:52

## 2025-04-30 RX ADMIN — SEVELAMER HYDROCHLORIDE 800 MILLIGRAM(S): 800 TABLET ORAL at 08:26

## 2025-04-30 RX ADMIN — Medication 25 GRAM(S): at 22:20

## 2025-04-30 RX ADMIN — SODIUM HYPOCHLORITE 1 APPLICATION(S): 0.12 SOLUTION TOPICAL at 06:49

## 2025-04-30 RX ADMIN — OXYCODONE HYDROCHLORIDE 15 MILLIGRAM(S): 30 TABLET ORAL at 16:53

## 2025-04-30 RX ADMIN — LIDOCAINE HYDROCHLORIDE 1 PATCH: 20 JELLY TOPICAL at 12:55

## 2025-04-30 RX ADMIN — Medication 220 MILLIGRAM(S): at 12:54

## 2025-04-30 RX ADMIN — CYCLOBENZAPRINE HYDROCHLORIDE 5 MILLIGRAM(S): 15 CAPSULE, EXTENDED RELEASE ORAL at 22:12

## 2025-04-30 RX ADMIN — Medication 40 MILLIGRAM(S): at 06:48

## 2025-04-30 RX ADMIN — APIXABAN 5 MILLIGRAM(S): 2.5 TABLET, FILM COATED ORAL at 06:48

## 2025-04-30 RX ADMIN — NALOXEGOL OXALATE 25 MILLIGRAM(S): 12.5 TABLET, FILM COATED ORAL at 12:54

## 2025-04-30 RX ADMIN — OXYCODONE HYDROCHLORIDE 15 MILLIGRAM(S): 30 TABLET ORAL at 23:18

## 2025-04-30 RX ADMIN — Medication 500 MILLIGRAM(S): at 12:54

## 2025-04-30 RX ADMIN — GABAPENTIN 1200 MILLIGRAM(S): 400 CAPSULE ORAL at 22:59

## 2025-04-30 RX ADMIN — DULOXETINE 60 MILLIGRAM(S): 20 CAPSULE, DELAYED RELEASE ORAL at 12:54

## 2025-04-30 RX ADMIN — OXYCODONE HYDROCHLORIDE 15 MILLIGRAM(S): 30 TABLET ORAL at 16:54

## 2025-04-30 RX ADMIN — Medication 3 MILLIGRAM(S): at 23:00

## 2025-04-30 RX ADMIN — Medication 25 GRAM(S): at 14:01

## 2025-04-30 RX ADMIN — Medication 1 TABLET(S): at 12:54

## 2025-04-30 RX ADMIN — Medication 1 APPLICATION(S): at 13:03

## 2025-04-30 RX ADMIN — APIXABAN 5 MILLIGRAM(S): 2.5 TABLET, FILM COATED ORAL at 16:53

## 2025-04-30 RX ADMIN — Medication 1 APPLICATION(S): at 06:49

## 2025-04-30 RX ADMIN — CLOTRIMAZOLE 1 APPLICATION(S): 1 CREAM TOPICAL at 13:03

## 2025-04-30 RX ADMIN — SODIUM HYPOCHLORITE 1 APPLICATION(S): 0.12 SOLUTION TOPICAL at 16:51

## 2025-04-30 RX ADMIN — Medication 1 APPLICATION(S): at 16:50

## 2025-04-30 RX ADMIN — QUETIAPINE FUMARATE 25 MILLIGRAM(S): 25 TABLET ORAL at 23:00

## 2025-04-30 RX ADMIN — AMLODIPINE BESYLATE 10 MILLIGRAM(S): 10 TABLET ORAL at 06:51

## 2025-04-30 RX ADMIN — SEVELAMER HYDROCHLORIDE 800 MILLIGRAM(S): 800 TABLET ORAL at 12:54

## 2025-04-30 NOTE — PROGRESS NOTE ADULT - SUBJECTIVE AND OBJECTIVE BOX
39 year old male with PMH of morbid obesity, BEA, pAFIB (not on AC), HTN, and BL papilledema attributed to pseudotumor cerebri; who initially presented to  on 2/24 with SOB and BL LE edema. Found to have URI with progressive SOB and multifocal PNA on imaging. His hospital course was complicated by worsening respiratory distress and increased 02 demands secondary to secretions (requiring multiple intubation attempts) and eventual MICU admission. While in MICU, he 02 requirements continued despite optimal vent management. Concern noted for progressive ARDS, unable to prone given morbid obesity, and ultimately cannulated for vvECMO on 2/25 and transferred to [facility] for further management on 2/26. His hospital course at [facility] was significant for the following: diuretic and ABX management, s/p trach and PEG (placed on 3/7- PEG since removed), RCU admission, high grade fevers (secondary to panniculitis), progressively worsening sacral ulcer (likely osteomyelitis- s/p surgical debridement), BL foot wound (secondary to pressor use), neuropathy (secondary to critical illness polyneuropathy), ELLA (secondary to vancomycin toxicity and overdiuresis- since DCd- with improvement)    Patient seen and examined at bedside. No acute events noted.  no new complaints, denies n/v, headache, sob.    Vital Signs Last 24 Hrs  T(C): 36.3 (30 Apr 2025 07:59), Max: 37.2 (29 Apr 2025 22:30)  T(F): 97.4 (30 Apr 2025 07:59), Max: 99 (29 Apr 2025 22:30)  HR: 104 (30 Apr 2025 07:59) (95 - 112)  BP: 116/78 (30 Apr 2025 07:59) (116/78 - 136/87)  BP(mean): --  RR: 16 (30 Apr 2025 07:59) (16 - 16)  SpO2: 100% (30 Apr 2025 07:59) (96% - 100%)    Parameters below as of 30 Apr 2025 07:59  Patient On (Oxygen Delivery Method): room air    GENERAL- NAD  EAR/NOSE/MOUTH/THROAT -  MMM  EYES- MOLLY, conjunctiva and Sclera clear  NECK- supple  RESPIRATORY-  clear to auscultation bilaterally  CARDIOVASCULAR - SIS2, RRR  GI - soft NT BS present  EXTREMITIES- no pedal edema  NEUROLOGY-  b/l LE weakness   PSYCHIATRY- AAO X 3                    x                    140  | 29   | 13           x     >-----------< x       ------------------------< 102                   x                    3.7  | 102  | 1.16                                         Ca 9.7   Mg x     Ph x          CAPILLARY BLOOD GLUCOSE        MEDICATIONS  (STANDING):  amLODIPine   Tablet 10 milliGRAM(s) Oral daily  ammonium lactate 12% Lotion 1 Application(s) Topical two times a day  apixaban 5 milliGRAM(s) Oral two times a day  ascorbic acid 500 milliGRAM(s) Oral daily  bisacodyl 10 milliGRAM(s) Oral at bedtime  capsaicin 0.025% Cream 1 Application(s) Topical four times a day  clotrimazole 1% Cream 1 Application(s) Topical <User Schedule>  Dakins Solution - 1/4 Strength 1 Application(s) Topical two times a day  DULoxetine 60 milliGRAM(s) Oral daily  gabapentin 1200 milliGRAM(s) Oral every 8 hours  glucagon  Injectable 1 milliGRAM(s) IntraMuscular once  influenza   Vaccine 0.5 milliLiter(s) IntraMuscular once  lidocaine   4% Patch 1 Patch Transdermal daily  lidocaine   4% Patch 1 Patch Transdermal daily  melatonin 3 milliGRAM(s) Oral at bedtime  multivitamin 1 Tablet(s) Oral daily  mupirocin 2% Ointment 1 Application(s) Topical two times a day  naloxegol 25 milliGRAM(s) Oral daily  pantoprazole    Tablet 40 milliGRAM(s) Oral before breakfast  piperacillin/tazobactam IVPB.. 3.375 Gram(s) IV Intermittent every 8 hours  polyethylene glycol 3350 17 Gram(s) Oral two times a day  povidone iodine 10% Solution 1 Application(s) Topical daily  QUEtiapine 25 milliGRAM(s) Oral at bedtime  sevelamer carbonate 800 milliGRAM(s) Oral three times a day with meals  zinc sulfate 220 milliGRAM(s) Oral daily    MEDICATIONS  (PRN):  acetaminophen     Tablet .. 650 milliGRAM(s) Oral every 6 hours PRN Temp greater or equal to 38C (100.4F), Mild Pain (1 - 3), Moderate Pain (4 - 6)  albuterol/ipratropium for Nebulization 3 milliLiter(s) Nebulizer every 6 hours PRN Shortness of Breath and/or Wheezing  cyclobenzaprine 5 milliGRAM(s) Oral three times a day PRN Muscle Spasm  dextrose Oral Gel 15 Gram(s) Oral once PRN Blood Glucose LESS THAN 70 milliGRAM(s)/deciliter  oxyCODONE    IR 5 milliGRAM(s) Oral two times a day PRN Severe Pain (7 - 10) related to dressing change  oxyCODONE    IR 15 milliGRAM(s) Oral every 6 hours PRN Severe Pain (7 - 10)

## 2025-04-30 NOTE — PROGRESS NOTE ADULT - ASSESSMENT
39 year old male with PMH of morbid obesity, BEA on CPAP, pAFIB (not on AC), HTN, and BL papilledema attributed to pseudotumor cerebri; who initially presented to  on 2/24 with SOB and BL LE edema. Found to have URI with progressive SOB and multifocal PNA on imaging. His hospital course was complicated by worsening respiratory distress and increased 02 demands secondary to secretions (requiring multiple intubation attempts) and eventual MICU admission. While in MICU, he 02 requirements continued despite optimal vent management. Concern noted for progressive ARDS, unable to prone given morbid obesity, and ultimately cannulated for vvECMO on 2/25 and transferred to [facility] for further management on 2/26.  His hospital course at [facility] was significant for the following: diuretic and ABX management, s/p trache and PEG (placed on 3/7- PEG since removed), RCU admission, high grade fevers (secondary to panniculitis), progressively worsening sacral ulcer (likely osteomyelitis- s/p surgical debridement), BL foot wound (secondary to pressor use), neuropathy (secondary to critical illness polyneuropathy), ELLA (secondary to vancomycin toxicity and overdiuresis- since DCd- with improvement). Patient now admitted for a multidisciplinary rehab program. 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 Pat dry. Cover with small silicone foam with border. Change every other day or prn if soiled     Sacral/gluteal cleft to bilateral buttocks- Irrigate deep cavity and tunneling with Dakins solution 1/4 strength using peribottle or flushes, cleanse wound and satellite ulcerations to b/l buttocks with NS, Dry well. Apply Liquid barrier film to periwound skin. Apply TRIAD barrier paste to isolated ulcer overlying left buttock,  Apply Aquacel hydrofiber sheet to right buttock, pack sacral/gluteal cleft including deep tunnels with Dakins 1/4 strength moistened kerlix, leave at least 2" out at end to ensure full removal of packing with subsequent dressing changes. Cover entire area (sacrum and right buttock) with abdominal pad and tegaderm. Change twice a day and prn if soiled/compromised. Once dressing is in place and perineal care is provided, clean remaining skin with perineal spray, apply TRIAD moisture barrier cream to exposed skin every shift and prn.    Bilateral abdominal pannus, bilateral groin: Cleanse with luke-warm soap and water, dry well. Apply clotrimazole cream daily, followed by Interdry textile sheeting, under intertriginous folds leaving 2 inches exposed at ends to wick, remove to wash & dry affected area, then replace. Individual sheeting may be used for up to 5 days unless soiled. Inspect skin daily.     - L foot wound- betadine to followed by 4x4 gauze, ABD pad, and matilde daily  - R foot wound- mupirocin BID     -Continue to offload pressure; bariatric low airloss support surface, turn and position per protocol with use of fluidized positioning devices, continue incontinence and moisture care per protocol and use of single breathable incontinence pads, continue to offload heels with fluidized positioning devices/Ochoa-Lock positioning device (PS# [id number]). Continue use of bariatric seat cushion (people soft #[id number]) and limit sit time- no more than 2 consecutive hours at any given time.   - Pressure injury/Skin: OOB to Chair, PT/OT   -consulting plastic surgery  - Ammonium lactate to BL LEs     #Neuropathy  - Cymbalta 60mg daily   - Gabapentin 1200mg TID     #Pain Mgmt   - Capsaicin cream to BL feet QID - Avoid use on damaged, broken, or irritated skin. Avoid occlusive dressing or heat   - Lidocaine patch to BL thighs   - PRN: Cyclobenzaprine 5mg TID; Oxycodone 5mg BID; Oxycodone 15mg daily (prior to dressing change); Tylenol 650mg QID     #GI/Bowel Mgmt   - Pantoprazole  - Bisacodyl 10mg at HS   - Miralax   - Naloxegal 25mg daily     #/Bladder Mgmt   #ELLA  - Renvela 800mg TID  - Cr improving     #FEN --Morbid obesity (BMI > 40).  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 Holder Liaison with Family/Providers:    -----------------------------------------------  OUTPATIENT/FOLLOW UP:    Your Primary Care Provider,   Phone: (   )    -  Fax: (   )    -  Follow Up Time: 1 week    [facility] [place],   [address] [place] [address]  Phone: [phone]  Fax: (   )    -  Follow Up Time:    Dr. Waterhouse  Podiatry  [phone]  Within 1 week   -----------------------------------------------    39 year old male with PMH of morbid obesity, BEA on CPAP, pAFIB (not on AC), HTN, and BL papilledema attributed to pseudotumor cerebri; who initially presented to  on 2/24 with SOB and BL LE edema. Found to have URI with progressive SOB and multifocal PNA on imaging. His hospital course was complicated by worsening respiratory distress and increased 02 demands secondary to secretions (requiring multiple intubation attempts) and eventual MICU admission. While in MICU, he 02 requirements continued despite optimal vent management. Concern noted for progressive ARDS, unable to prone given morbid obesity, and ultimately cannulated for vvECMO on 2/25 and transferred to [facility] for further management on 2/26.  His hospital course at [facility] was significant for the following: diuretic and ABX management, s/p trache and PEG (placed on 3/7- PEG since removed), RCU admission, high grade fevers (secondary to panniculitis), progressively worsening sacral ulcer (likely osteomyelitis- s/p surgical debridement), BL foot wound (secondary to pressor use), neuropathy (secondary to critical illness polyneuropathy), ELLA (secondary to vancomycin toxicity and overdiuresis- since DCd- with improvement). Patient now admitted for a multidisciplinary rehab program. 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 Pat dry. Cover with small silicone foam with border. Change every other day or prn if soiled     Sacral/gluteal cleft to bilateral buttocks- Irrigate deep cavity and tunneling with Dakins solution 1/4 strength using peribottle or flushes, cleanse wound and satellite ulcerations to b/l buttocks with NS, Dry well. Apply Liquid barrier film to periwound skin. Apply TRIAD barrier paste to isolated ulcer overlying left buttock,  Apply Aquacel hydrofiber sheet to right buttock, pack sacral/gluteal cleft including deep tunnels with Dakins 1/4 strength moistened kerlix, leave at least 2" out at end to ensure full removal of packing with subsequent dressing changes. Cover entire area (sacrum and right buttock) with abdominal pad and tegaderm. Change twice a day and prn if soiled/compromised. Once dressing is in place and perineal care is provided, clean remaining skin with perineal spray, apply TRIAD moisture barrier cream to exposed skin every shift and prn.    Bilateral abdominal pannus, bilateral groin: Cleanse with luke-warm soap and water, dry well. Apply clotrimazole cream daily, followed by Interdry textile sheeting, under intertriginous folds leaving 2 inches exposed at ends to wick, remove to wash & dry affected area, then replace. Individual sheeting may be used for up to 5 days unless soiled. Inspect skin daily.     - L foot wound- betadine to followed by 4x4 gauze, ABD pad, and matilde daily  - R foot wound- mupirocin BID     -Continue to offload pressure; bariatric low airloss support surface, turn and position per protocol with use of fluidized positioning devices, continue incontinence and moisture care per protocol and use of single breathable incontinence pads, continue to offload heels with fluidized positioning devices/Ochoa-Lock positioning device (PS# [id number]). Continue use of bariatric seat cushion (people soft #[id number]) and limit sit time- no more than 2 consecutive hours at any given time.   - Pressure injury/Skin: OOB to Chair, PT/OT   -consulting plastic surgery  - Ammonium lactate to BL LEs     #Neuropathy  - Cymbalta 60mg daily   - Gabapentin 1200mg TID     #Pain Mgmt   - Capsaicin cream to BL feet QID - Avoid use on damaged, broken, or irritated skin. Avoid occlusive dressing or heat   - Lidocaine patch to BL thighs   - PRN: Cyclobenzaprine 5mg TID; Oxycodone 5mg BID; Oxycodone 15mg daily (prior to dressing change); Tylenol 650mg QID     #GI/Bowel Mgmt   - Pantoprazole  - Bisacodyl 10mg at HS   - Miralax   - Naloxegal 25mg daily     #/Bladder Mgmt   #ELLA  - Renvela 800mg TID  - Cr improving     #FEN --Morbid obesity (BMI > 40).  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 Holder Liaison with Family/Providers:  4/30--Girlfriend present at bedside, provided collateral hx and update on patient's function   Discussed treatment plan with her   -----------------------------------------------  OUTPATIENT/FOLLOW UP:    Your Primary Care Provider,   Phone: (   )    -  Fax: (   )    -  Follow Up Time: 1 week    [facility] [place],   [address] [place] [address]  Phone: [phone]  Fax: (   )    -  Follow Up Time:    Dr. Waterhouse  Podiatry  [phone]  Within 1 week   -----------------------------------------------

## 2025-04-30 NOTE — PROGRESS NOTE ADULT - SUBJECTIVE AND OBJECTIVE BOX
Subjective  Seen and examined at bedside this AM.  Girlfriend present.  Admits to sleeping well with use of cpap.   Experiencing muscle soreness from therapy sessions.   Discussed negative BL LE doppler results.  Discussed bandaid to trache site.     ROS--no acute pain, no fever or chest pain   B/l leg and sacral ulcers  LBM 4/29    Function   Pt performed rolling in bed from side-to-side multi times with use of HR.  Pt [place] from bed to WC assist of 2  Pt performed  // bars by 3 trials from 15-30" strand Mod 1 and min of a  2nd (assist of2) Assist at ITs for hip ext and at b/l knee for block on stand    Therapeutic Exercise  Participated in lower extremity therex, AAROM bilaterally: internal/external  rotation, knee flexion, hip flexion/extension, gravity eliminated and PROM  supine with HOB elevated.      Vital Signs Last 24 Hrs  T(C): 36.3 (30 Apr 2025 07:59), Max: 37.2 (29 Apr 2025 22:30)  T(F): 97.4 (30 Apr 2025 07:59), Max: 99 (29 Apr 2025 22:30)  HR: 104 (30 Apr 2025 07:59) (95 - 112)  BP: 116/78 (30 Apr 2025 07:59) (116/78 - 136/87)  BP(mean): --  RR: 16 (30 Apr 2025 07:59) (16 - 16)  SpO2: 100% (30 Apr 2025 07:59) (96% - 100%)      EXAM   Gen - NAD, Comfortable, obese  HEENT - NCAT, EOMI, MMM  Neck - Supple, No limited ROM  Pulm - CTAB, No wheeze, No rhonchi, No crackles  Cardiovascular - RRR, S1S2, No murmurs  Abdomen - Soft, non tender, +BS  Extremities - chronic skin changes consistent with elephantiasis, midline present LUE  Tender calves during exam    Neuro-  AAO X 3, Speech is normal     Motor - UE 5/5                    LEFT    LE - HF 3/5, KE 2/5 limited by bed size and body habitus, DF 1/5, PF 5/5                    RIGHT LE - HF 3/5, KE 2/5 limited by bed size and body habitus , DF 1/5, PF 5/5        Sensory - Decreased to light touch bilateral lower extremities      Coordination - impaired lower extremities   Psychiatric - Mood stable, Affect WNL    Skin:  R foot plantar aspect lateral/distal side 7cm x 5.5cm ischemic wound from pressors  RLE 4th digit 1x1.5cm ischemic induced wound from pressors  RLE 5th digit 3x2cm ischemic induced wound from pressors  L foot plantar aspect lateral/distal side 6x7cm ischemic induced wound from pressor usage  LLE 4th digit 2x1cm ischemic induced wound from pressors  LLE 5th digit 2.5x3cm ischemic induced wound from pressors   R groin skin tear 1x1.5cm  Prior PEG insertion site 0.5x1cm   Former trach site 1x0.5cm  Unstageable pressure wound cocyx 7cmx4.5cm w/ 2.5cm depth  R buttock pressure induced wound semi contiguous with coccyx wound calling unstageable given prior debridement 8.5x5.5cm  L buttock 1.0x0.5cm stage 3 pressure injury  R buttock skin tear 3.5cm x 0.5cm and 1.5cmx0.5cm    LABS:                        8.7    7.22  )-----------( 388      ( 28 Apr 2025 07:32 )             29.7     04-29    140  |  102  |  13  ----------------------------<  102[H]  3.7   |  29  |  1.16    Ca    9.7      29 Apr 2025 05:52  Phos  5.1     04-28  Mg     1.7     04-28    TPro  7.7  /  Alb  2.3[L]  /  TBili  0.4  /  DBili  x   /  AST  22  /  ALT  24  /  AlkPhos  54  04-28      Urinalysis Basic - ( 29 Apr 2025 05:52 )    Color: x / Appearance: x / SG: x / pH: x  Gluc: 102 mg/dL / Ketone: x  / Bili: x / Urobili: x   Blood: x / Protein: x / Nitrite: x   Leuk Esterase: x / RBC: x / WBC x   Sq Epi: x / Non Sq Epi: x / Bacteria: x      MEDICATIONS  (STANDING):  amLODIPine   Tablet 10 milliGRAM(s) Oral daily  ammonium lactate 12% Lotion 1 Application(s) Topical two times a day  apixaban 5 milliGRAM(s) Oral two times a day  ascorbic acid 500 milliGRAM(s) Oral daily  bisacodyl 10 milliGRAM(s) Oral at bedtime  capsaicin 0.025% Cream 1 Application(s) Topical four times a day  clotrimazole 1% Cream 1 Application(s) Topical <User Schedule>  Dakins Solution - 1/4 Strength 1 Application(s) Topical two times a day  dextrose 5%. 1000 milliLiter(s) (100 mL/Hr) IV Continuous <Continuous>  dextrose 5%. 1000 milliLiter(s) (50 mL/Hr) IV Continuous <Continuous>  dextrose 50% Injectable 25 Gram(s) IV Push once  dextrose 50% Injectable 12.5 Gram(s) IV Push once  dextrose 50% Injectable 25 Gram(s) IV Push once  DULoxetine 60 milliGRAM(s) Oral daily  gabapentin 1200 milliGRAM(s) Oral every 8 hours  glucagon  Injectable 1 milliGRAM(s) IntraMuscular once  influenza   Vaccine 0.5 milliLiter(s) IntraMuscular once  lidocaine   4% Patch 1 Patch Transdermal daily  lidocaine   4% Patch 1 Patch Transdermal daily  melatonin 3 milliGRAM(s) Oral at bedtime  multivitamin 1 Tablet(s) Oral daily  mupirocin 2% Ointment 1 Application(s) Topical two times a day  naloxegol 25 milliGRAM(s) Oral daily  pantoprazole    Tablet 40 milliGRAM(s) Oral before breakfast  piperacillin/tazobactam IVPB.. 3.375 Gram(s) IV Intermittent every 8 hours  polyethylene glycol 3350 17 Gram(s) Oral two times a day  povidone iodine 10% Solution 1 Application(s) Topical daily  QUEtiapine 25 milliGRAM(s) Oral at bedtime  sevelamer carbonate 800 milliGRAM(s) Oral three times a day with meals  zinc sulfate 220 milliGRAM(s) Oral daily    MEDICATIONS  (PRN):  acetaminophen     Tablet .. 650 milliGRAM(s) Oral every 6 hours PRN Temp greater or equal to 38C (100.4F), Mild Pain (1 - 3), Moderate Pain (4 - 6)  albuterol/ipratropium for Nebulization 3 milliLiter(s) Nebulizer every 6 hours PRN Shortness of Breath and/or Wheezing  cyclobenzaprine 5 milliGRAM(s) Oral three times a day PRN Muscle Spasm  dextrose Oral Gel 15 Gram(s) Oral once PRN Blood Glucose LESS THAN 70 milliGRAM(s)/deciliter  oxyCODONE    IR 5 milliGRAM(s) Oral two times a day PRN Severe Pain (7 - 10) related to dressing change  oxyCODONE    IR 15 milliGRAM(s) Oral every 6 hours PRN Severe Pain (7 - 10)     Subjective  Seen and examined at bedside this AM.  Girlfriend present.  Admits to sleeping well with use of cpap.   Experiencing muscle soreness from therapy sessions.   Discussed negative BL LE doppler results. Trach stoma is closing with tiny opening present  Discussed bandaid to trache site.     ROS--no acute pain, no fever or chest pain   B/l leg and sacral ulcers  LBM 4/29    Function   Pt performed rolling in bed from side-to-side multi times with use of HR.  Pt [place] from bed to WC assist of 2  Pt performed  // bars by 3 trials from 15-30" strand Mod 1 and min of a  2nd (assist of2) Assist at ITs for hip ext and at b/l knee for block on stand    Therapeutic Exercise  Participated in lower extremity therex, AAROM bilaterally: internal/external  rotation, knee flexion, hip flexion/extension, gravity eliminated and PROM  supine with HOB elevated.      Vital Signs Last 24 Hrs  T(C): 36.3 (30 Apr 2025 07:59), Max: 37.2 (29 Apr 2025 22:30)  T(F): 97.4 (30 Apr 2025 07:59), Max: 99 (29 Apr 2025 22:30)  HR: 104 (30 Apr 2025 07:59) (95 - 112)  BP: 116/78 (30 Apr 2025 07:59) (116/78 - 136/87)  BP(mean): --  RR: 16 (30 Apr 2025 07:59) (16 - 16)  SpO2: 100% (30 Apr 2025 07:59) (96% - 100%)      EXAM   Gen -  Comfortable, o  HEENT - NCAT, EOMI, MMM  Neck - Supple, No limited ROM  Pulm - CTAB, No wheeze, No rhonchi, No crackles  Cardiovascular - RRR, S1S2, No murmurs  Abdomen - Soft, non tender, +BS  Extremities - chronic skin changes consistent with elephantiasis, midline present LUE  Calf tenderness is mild  Edema reducing     Neuro-  AAO X 3, Speech is normal     Motor - UE 5/5                    LEFT    LE - HF 3/5, KE 2/5 limited by bed size and body habitus, DF 1/5, PF 5/5                    RIGHT LE - HF 3/5, KE 2/5 limited by bed size and body habitus , DF 1/5, PF 5/5        Sensory - Decreased to light touch bilateral lower extremities      Coordination - impaired lower extremities   Psychiatric - Mood stable, Affect WNL    Skin:  R foot plantar aspect lateral/distal side 7cm x 5.5cm ischemic wound from pressors  RLE 4th digit 1x1.5cm ischemic induced wound from pressors  RLE 5th digit 3x2cm ischemic induced wound from pressors  L foot plantar aspect lateral/distal side 6x7cm ischemic induced wound from pressor usage  LLE 4th digit 2x1cm ischemic induced wound from pressors  LLE 5th digit 2.5x3cm ischemic induced wound from pressors   R groin skin tear 1x1.5cm  Prior PEG insertion site 0.5x1cm   Former trach site 1x0.5cm  Unstageable pressure wound cocyx 7cmx4.5cm w/ 2.5cm depth  R buttock pressure induced wound semi contiguous with coccyx wound calling unstageable given prior debridement 8.5x5.5cm  L buttock 1.0x0.5cm stage 3 pressure injury  R buttock skin tear 3.5cm x 0.5cm and 1.5cmx0.5cm    LABS:                        8.7    7.22  )-----------( 388      ( 28 Apr 2025 07:32 )             29.7     04-29    140  |  102  |  13  ----------------------------<  102[H]  3.7   |  29  |  1.16    Ca    9.7      29 Apr 2025 05:52  Phos  5.1     04-28  Mg     1.7     04-28    TPro  7.7  /  Alb  2.3[L]  /  TBili  0.4  /  DBili  x   /  AST  22  /  ALT  24  /  AlkPhos  54  04-28      Urinalysis Basic - ( 29 Apr 2025 05:52 )    Color: x / Appearance: x / SG: x / pH: x  Gluc: 102 mg/dL / Ketone: x  / Bili: x / Urobili: x   Blood: x / Protein: x / Nitrite: x   Leuk Esterase: x / RBC: x / WBC x   Sq Epi: x / Non Sq Epi: x / Bacteria: x      MEDICATIONS  (STANDING):  amLODIPine   Tablet 10 milliGRAM(s) Oral daily  ammonium lactate 12% Lotion 1 Application(s) Topical two times a day  apixaban 5 milliGRAM(s) Oral two times a day  ascorbic acid 500 milliGRAM(s) Oral daily  bisacodyl 10 milliGRAM(s) Oral at bedtime  capsaicin 0.025% Cream 1 Application(s) Topical four times a day  clotrimazole 1% Cream 1 Application(s) Topical <User Schedule>  Dakins Solution - 1/4 Strength 1 Application(s) Topical two times a day  dextrose 5%. 1000 milliLiter(s) (100 mL/Hr) IV Continuous <Continuous>  dextrose 5%. 1000 milliLiter(s) (50 mL/Hr) IV Continuous <Continuous>  dextrose 50% Injectable 25 Gram(s) IV Push once  dextrose 50% Injectable 12.5 Gram(s) IV Push once  dextrose 50% Injectable 25 Gram(s) IV Push once  DULoxetine 60 milliGRAM(s) Oral daily  gabapentin 1200 milliGRAM(s) Oral every 8 hours  glucagon  Injectable 1 milliGRAM(s) IntraMuscular once  influenza   Vaccine 0.5 milliLiter(s) IntraMuscular once  lidocaine   4% Patch 1 Patch Transdermal daily  lidocaine   4% Patch 1 Patch Transdermal daily  melatonin 3 milliGRAM(s) Oral at bedtime  multivitamin 1 Tablet(s) Oral daily  mupirocin 2% Ointment 1 Application(s) Topical two times a day  naloxegol 25 milliGRAM(s) Oral daily  pantoprazole    Tablet 40 milliGRAM(s) Oral before breakfast  piperacillin/tazobactam IVPB.. 3.375 Gram(s) IV Intermittent every 8 hours  polyethylene glycol 3350 17 Gram(s) Oral two times a day  povidone iodine 10% Solution 1 Application(s) Topical daily  QUEtiapine 25 milliGRAM(s) Oral at bedtime  sevelamer carbonate 800 milliGRAM(s) Oral three times a day with meals  zinc sulfate 220 milliGRAM(s) Oral daily    MEDICATIONS  (PRN):  acetaminophen     Tablet .. 650 milliGRAM(s) Oral every 6 hours PRN Temp greater or equal to 38C (100.4F), Mild Pain (1 - 3), Moderate Pain (4 - 6)  albuterol/ipratropium for Nebulization 3 milliLiter(s) Nebulizer every 6 hours PRN Shortness of Breath and/or Wheezing  cyclobenzaprine 5 milliGRAM(s) Oral three times a day PRN Muscle Spasm  dextrose Oral Gel 15 Gram(s) Oral once PRN Blood Glucose LESS THAN 70 milliGRAM(s)/deciliter  oxyCODONE    IR 5 milliGRAM(s) Oral two times a day PRN Severe Pain (7 - 10) related to dressing change  oxyCODONE    IR 15 milliGRAM(s) Oral every 6 hours PRN Severe Pain (7 - 10)

## 2025-04-30 NOTE — PROGRESS NOTE ADULT - ASSESSMENT
39 year old male with PMH of morbid obesity, BEA, pAFIB (not on AC), HTN, and BL papilledema attributed to pseudotumor cerebri; who initially presented to  on 2/24 with SOB and BL LE edema. Found to have URI with progressive SOB and multifocal PNA on imaging. His hospital course was complicated by worsening respiratory distress and increased 02 demands secondary to secretions (requiring multiple intubation attempts) and eventual MICU admission. While in MICU, he 02 requirements continued despite optimal vent management. Concern noted for progressive ARDS, unable to prone given morbid obesity, and ultimately cannulated for vvECMO on 2/25 and transferred to [facility] for further management on 2/26. His hospital course at [facility] was significant for the following: diuretic and ABX management, s/p trach and PEG (placed on 3/7- PEG since removed), RCU admission, high grade fevers (secondary to panniculitis), progressively worsening sacral ulcer (likely osteomyelitis- s/p surgical debridement), BL foot wound (secondary to pressor use), neuropathy (secondary to critical illness polyneuropathy), ELLA (secondary to vancomycin toxicity and overdiuresis- since DCd- with improvement). Patient now admitted for a multidisciplinary rehab program. 04-25-25     #Debility Secondary to Acute Hypoxic Respiratory Failure/ARDS 2/2 PNA  #BEA (Not on CPAP)  #Critical Illness Polyneuropathy   -s/p VV ECMO, s/p diuresis, TTE/ROBERT with RV failure, s/p treatment for pneumonia  -Repeat Echo 3/11 showed EF 66 %. RV is not well visualized, enlarged RV cavity size a/ reduced RV systolic function. No significant valvular disease.  No echocardiographic evidence of pulmonary hypertension.  -s/p Trach (decannulated 3/28) and PEG (removed 4/15)   -Continue duoneb PRN   -BIPAP QHS   - PT/OT/SLP      #Possible Sacral Osteomyelitis  -Continue Zosyn until  5/11/25    #Fungemia 2/2 Panniculitis  -Blood culture 3/15 and 3/16 with candida albicans  -s/p course of antifungals   -Repeat cultures negative since 3/18    #Chronic Paroxysmal AFIB  #Sinus Tachycardia  -Eliquis 5mg BID   -Not on AVN blocker   -EKG 4/28 showed sinus tachycardia, no acute acute ST/T wave changes  -TSH WNL 2/25  -Sinus tachycardia likely multifactorial including pain and deconditioning. Low suspicion for PE, no SOB/hypoxia and he is already on full AC    #RIJ and Bilateral LE DVT  -Duplex RUE 3/14 showed Acute, non-occlusive deep vein thrombosis noted within the right internal jugular vein. Superficial vein thrombosis noted within the right antecubital cephalic vein.  -Duplex LE 3/14 showed Acute, occlusive deep vein thromboses noted within the right and left peroneal veins at both mid calves. Age-indeterminate, occlusive deep vein thrombosis visualized within the left gastrocnemius vein at the    proximal calf.  -Continue Eliquis  -Follow up repeat duplex 4/29    #Normocytic Anemia   -Likely multifactorial  -H/H stable, no evidence of active bleed   -Monitor CBC     #HTN  -Continue Amlodipine  -Monitor BP     #Hypokalemia (Resolved)  -s/p repletion  -Monitor BMP    #Sleep/Mood  -Continue Melatonin  -Continue Quetiapine      #History of Pseudotumor Cerebri   -Outpatient follow up     #Stage IV Sacral Decub Ulcer  #Bilateral Buttocks Wounds  #Bilateral Foot Wounds  -Continue local wound care  -Off load pressure  -MVI, vitamin C and zinc   -Plastic consult following     #ELLA (Improved)  -Baseline Cr appears to be ~0.8 range   -ELLA felt to be multifactorial in setting of cardiorenal w/ RVE, recurrent ELLA suspected due to vancomycin toxicity and diuresis   -Cr mild uptrend to 1.45 (4/28) -> 1.16 on 4/29  -Continue Renvela, monitor phos level      #Type 2 Diabetes  -HbA1C 6.7 on 2/25, Repeat HbA1C 4.9 on 4/28l  - diet controlled     #GI PPx  -PPI    #DVT PPx  -On Eliquis    Discussed with rehab team

## 2025-05-01 LAB
ALBUMIN SERPL ELPH-MCNC: 2.1 G/DL — LOW (ref 3.3–5)
ALP SERPL-CCNC: 49 U/L — SIGNIFICANT CHANGE UP (ref 40–120)
ALT FLD-CCNC: 18 U/L — SIGNIFICANT CHANGE UP (ref 10–45)
ANION GAP SERPL CALC-SCNC: 9 MMOL/L — SIGNIFICANT CHANGE UP (ref 5–17)
AST SERPL-CCNC: 19 U/L — SIGNIFICANT CHANGE UP (ref 10–40)
BASOPHILS # BLD AUTO: 0.03 K/UL — SIGNIFICANT CHANGE UP (ref 0–0.2)
BASOPHILS NFR BLD AUTO: 0.5 % — SIGNIFICANT CHANGE UP (ref 0–2)
BILIRUB SERPL-MCNC: 0.4 MG/DL — SIGNIFICANT CHANGE UP (ref 0.2–1.2)
BUN SERPL-MCNC: 13 MG/DL — SIGNIFICANT CHANGE UP (ref 7–23)
CALCIUM SERPL-MCNC: 9.4 MG/DL — SIGNIFICANT CHANGE UP (ref 8.4–10.5)
CHLORIDE SERPL-SCNC: 104 MMOL/L — SIGNIFICANT CHANGE UP (ref 96–108)
CO2 SERPL-SCNC: 27 MMOL/L — SIGNIFICANT CHANGE UP (ref 22–31)
CREAT SERPL-MCNC: 1.03 MG/DL — SIGNIFICANT CHANGE UP (ref 0.5–1.3)
EGFR: 96 ML/MIN/1.73M2 — SIGNIFICANT CHANGE UP
EGFR: 96 ML/MIN/1.73M2 — SIGNIFICANT CHANGE UP
EOSINOPHIL # BLD AUTO: 0.3 K/UL — SIGNIFICANT CHANGE UP (ref 0–0.5)
EOSINOPHIL NFR BLD AUTO: 5.1 % — SIGNIFICANT CHANGE UP (ref 0–6)
GLUCOSE SERPL-MCNC: 97 MG/DL — SIGNIFICANT CHANGE UP (ref 70–99)
HCT VFR BLD CALC: 29 % — LOW (ref 39–50)
HGB BLD-MCNC: 8.3 G/DL — LOW (ref 13–17)
IMM GRANULOCYTES NFR BLD AUTO: 0.2 % — SIGNIFICANT CHANGE UP (ref 0–0.9)
LYMPHOCYTES # BLD AUTO: 1.89 K/UL — SIGNIFICANT CHANGE UP (ref 1–3.3)
LYMPHOCYTES # BLD AUTO: 32 % — SIGNIFICANT CHANGE UP (ref 13–44)
MCHC RBC-ENTMCNC: 25.1 PG — LOW (ref 27–34)
MCHC RBC-ENTMCNC: 28.6 G/DL — LOW (ref 32–36)
MCV RBC AUTO: 87.6 FL — SIGNIFICANT CHANGE UP (ref 80–100)
MONOCYTES # BLD AUTO: 0.91 K/UL — HIGH (ref 0–0.9)
MONOCYTES NFR BLD AUTO: 15.4 % — HIGH (ref 2–14)
NEUTROPHILS # BLD AUTO: 2.76 K/UL — SIGNIFICANT CHANGE UP (ref 1.8–7.4)
NEUTROPHILS NFR BLD AUTO: 46.8 % — SIGNIFICANT CHANGE UP (ref 43–77)
NRBC BLD AUTO-RTO: 0 /100 WBCS — SIGNIFICANT CHANGE UP (ref 0–0)
PHOSPHATE SERPL-MCNC: 5.8 MG/DL — HIGH (ref 2.5–4.5)
PLATELET # BLD AUTO: 325 K/UL — SIGNIFICANT CHANGE UP (ref 150–400)
POTASSIUM SERPL-MCNC: 3.6 MMOL/L — SIGNIFICANT CHANGE UP (ref 3.5–5.3)
POTASSIUM SERPL-SCNC: 3.6 MMOL/L — SIGNIFICANT CHANGE UP (ref 3.5–5.3)
PROT SERPL-MCNC: 7.4 G/DL — SIGNIFICANT CHANGE UP (ref 6–8.3)
RBC # BLD: 3.31 M/UL — LOW (ref 4.2–5.8)
RBC # FLD: 20.2 % — HIGH (ref 10.3–14.5)
SODIUM SERPL-SCNC: 140 MMOL/L — SIGNIFICANT CHANGE UP (ref 135–145)
WBC # BLD: 5.9 K/UL — SIGNIFICANT CHANGE UP (ref 3.8–10.5)
WBC # FLD AUTO: 5.9 K/UL — SIGNIFICANT CHANGE UP (ref 3.8–10.5)

## 2025-05-01 PROCEDURE — 99233 SBSQ HOSP IP/OBS HIGH 50: CPT

## 2025-05-01 PROCEDURE — 74178 CT ABD&PLV WO CNTR FLWD CNTR: CPT | Mod: 26

## 2025-05-01 PROCEDURE — 99232 SBSQ HOSP IP/OBS MODERATE 35: CPT

## 2025-05-01 RX ORDER — TAMSULOSIN HYDROCHLORIDE 0.4 MG/1
0.4 CAPSULE ORAL AT BEDTIME
Refills: 0 | Status: DISCONTINUED | OUTPATIENT
Start: 2025-05-01 | End: 2025-05-06

## 2025-05-01 RX ADMIN — CYCLOBENZAPRINE HYDROCHLORIDE 5 MILLIGRAM(S): 15 CAPSULE, EXTENDED RELEASE ORAL at 22:07

## 2025-05-01 RX ADMIN — OXYCODONE HYDROCHLORIDE 5 MILLIGRAM(S): 30 TABLET ORAL at 11:59

## 2025-05-01 RX ADMIN — CYCLOBENZAPRINE HYDROCHLORIDE 5 MILLIGRAM(S): 15 CAPSULE, EXTENDED RELEASE ORAL at 15:59

## 2025-05-01 RX ADMIN — SEVELAMER HYDROCHLORIDE 800 MILLIGRAM(S): 800 TABLET ORAL at 17:45

## 2025-05-01 RX ADMIN — Medication 3 MILLIGRAM(S): at 22:22

## 2025-05-01 RX ADMIN — GABAPENTIN 1200 MILLIGRAM(S): 400 CAPSULE ORAL at 06:50

## 2025-05-01 RX ADMIN — OXYCODONE HYDROCHLORIDE 15 MILLIGRAM(S): 30 TABLET ORAL at 23:22

## 2025-05-01 RX ADMIN — Medication 220 MILLIGRAM(S): at 11:59

## 2025-05-01 RX ADMIN — Medication 25 GRAM(S): at 06:53

## 2025-05-01 RX ADMIN — Medication 25 GRAM(S): at 22:17

## 2025-05-01 RX ADMIN — NALOXEGOL OXALATE 25 MILLIGRAM(S): 12.5 TABLET, FILM COATED ORAL at 11:58

## 2025-05-01 RX ADMIN — Medication 500 MILLIGRAM(S): at 11:59

## 2025-05-01 RX ADMIN — OXYCODONE HYDROCHLORIDE 15 MILLIGRAM(S): 30 TABLET ORAL at 22:22

## 2025-05-01 RX ADMIN — AMLODIPINE BESYLATE 10 MILLIGRAM(S): 10 TABLET ORAL at 06:50

## 2025-05-01 RX ADMIN — SEVELAMER HYDROCHLORIDE 800 MILLIGRAM(S): 800 TABLET ORAL at 08:46

## 2025-05-01 RX ADMIN — CLOTRIMAZOLE 1 APPLICATION(S): 1 CREAM TOPICAL at 11:59

## 2025-05-01 RX ADMIN — OXYCODONE HYDROCHLORIDE 15 MILLIGRAM(S): 30 TABLET ORAL at 15:59

## 2025-05-01 RX ADMIN — Medication 1 TABLET(S): at 13:47

## 2025-05-01 RX ADMIN — Medication 40 MILLIGRAM(S): at 06:50

## 2025-05-01 RX ADMIN — QUETIAPINE FUMARATE 25 MILLIGRAM(S): 25 TABLET ORAL at 22:22

## 2025-05-01 RX ADMIN — GABAPENTIN 1200 MILLIGRAM(S): 400 CAPSULE ORAL at 13:42

## 2025-05-01 RX ADMIN — Medication 1 APPLICATION(S): at 11:59

## 2025-05-01 RX ADMIN — OXYCODONE HYDROCHLORIDE 5 MILLIGRAM(S): 30 TABLET ORAL at 12:59

## 2025-05-01 RX ADMIN — DULOXETINE 60 MILLIGRAM(S): 20 CAPSULE, DELAYED RELEASE ORAL at 11:58

## 2025-05-01 RX ADMIN — GABAPENTIN 1200 MILLIGRAM(S): 400 CAPSULE ORAL at 22:07

## 2025-05-01 RX ADMIN — SEVELAMER HYDROCHLORIDE 800 MILLIGRAM(S): 800 TABLET ORAL at 11:58

## 2025-05-01 RX ADMIN — OXYCODONE HYDROCHLORIDE 15 MILLIGRAM(S): 30 TABLET ORAL at 16:59

## 2025-05-01 RX ADMIN — TAMSULOSIN HYDROCHLORIDE 0.4 MILLIGRAM(S): 0.4 CAPSULE ORAL at 22:22

## 2025-05-01 RX ADMIN — MUPIROCIN CALCIUM 1 APPLICATION(S): 20 CREAM TOPICAL at 17:39

## 2025-05-01 RX ADMIN — Medication 1 APPLICATION(S): at 17:39

## 2025-05-01 RX ADMIN — APIXABAN 5 MILLIGRAM(S): 2.5 TABLET, FILM COATED ORAL at 17:45

## 2025-05-01 RX ADMIN — APIXABAN 5 MILLIGRAM(S): 2.5 TABLET, FILM COATED ORAL at 06:50

## 2025-05-01 RX ADMIN — SODIUM HYPOCHLORITE 1 APPLICATION(S): 0.12 SOLUTION TOPICAL at 17:39

## 2025-05-01 RX ADMIN — Medication 25 GRAM(S): at 13:42

## 2025-05-01 NOTE — PROGRESS NOTE ADULT - SUBJECTIVE AND OBJECTIVE BOX
Subjective  Seen and examined at bedside this AM.  Girlfriend present.   Admits to sleeping well with use of cpap.   Experiencing muscle soreness from therapy sessions.   Per therapy, experiencing urinary retention.   RNs requesting Plastic Surgery to re-eval for possible wound vac.   Discussed pRAFO boots at HS.    ROS--no acute pain, no fever or chest pain   B/l leg and sacral ulcers  LBM 4/30    Function   Pt performed rolling in bed from side-to-side multi times with use of HR.  Pt Nette from bed to WC assist of 2  Pt performed  // bars by 3 trials from 15-30" strand Mod 1 and min of a  2nd (assist of2) Assist at ITs for hip ext and at b/l knee for block on stand    Therapeutic Exercise  Participated in lower extremity therex, AAROM bilaterally: internal/external  rotation, knee flexion, hip flexion/extension, gravity eliminated and PROM  supine with HOB elevated.      Vital Signs Last 24 Hrs  T(C): 36.9 (01 May 2025 07:20), Max: 36.9 (30 Apr 2025 22:09)  T(F): 98.4 (01 May 2025 07:20), Max: 98.4 (30 Apr 2025 22:09)  HR: 102 (01 May 2025 07:20) (101 - 116)  BP: 117/74 (01 May 2025 07:20) (117/74 - 128/83)  BP(mean): --  RR: 16 (01 May 2025 07:20) (16 - 16)  SpO2: 97% (01 May 2025 07:20) (95% - 97%)      EXAM   Gen -  Comfortable, o  HEENT - NCAT, EOMI, MMM  Neck - Supple, No limited ROM  Pulm - CTAB, No wheeze, No rhonchi, No crackles  Cardiovascular - RRR, S1S2, No murmurs  Abdomen - Soft, non tender, +BS  Extremities - chronic skin changes consistent with elephantiasis, midline present LUE  Calf tenderness is mild  Edema reducing     Neuro-  AAO X 3, Speech is normal     Motor - UE 5/5                    LEFT    LE - HF 3/5, KE 2/5 limited by bed size and body habitus, DF 1/5, PF 5/5                    RIGHT LE - HF 3/5, KE 2/5 limited by bed size and body habitus , DF 1/5, PF 5/5        Sensory - Decreased to light touch bilateral lower extremities      Coordination - impaired lower extremities   Psychiatric - Mood stable, Affect WNL    Skin:  R foot plantar aspect lateral/distal side 7cm x 5.5cm ischemic wound from pressors  RLE 4th digit 1x1.5cm ischemic induced wound from pressors  RLE 5th digit 3x2cm ischemic induced wound from pressors  L foot plantar aspect lateral/distal side 6x7cm ischemic induced wound from pressor usage  LLE 4th digit 2x1cm ischemic induced wound from pressors  LLE 5th digit 2.5x3cm ischemic induced wound from pressors   R groin skin tear 1x1.5cm  Prior PEG insertion site 0.5x1cm   Former trach site 1x0.5cm  Unstageable pressure wound cocyx 7cmx4.5cm w/ 2.5cm depth  R buttock pressure induced wound semi contiguous with coccyx wound calling unstageable given prior debridement 8.5x5.5cm  L buttock 1.0x0.5cm stage 3 pressure injury  R buttock skin tear 3.5cm x 0.5cm and 1.5cmx0.5cm    LABS:                        8.3    5.90  )-----------( 325      ( 01 May 2025 06:58 )             29.0     05-01    140  |  104  |  13  ----------------------------<  97  3.6   |  27  |  1.03    Ca    9.4      01 May 2025 06:58  Phos  5.8     05-01    TPro  7.4  /  Alb  2.1[L]  /  TBili  0.4  /  DBili  x   /  AST  19  /  ALT  18  /  AlkPhos  49  05-01      Urinalysis Basic - ( 01 May 2025 06:58 )    Color: x / Appearance: x / SG: x / pH: x  Gluc: 97 mg/dL / Ketone: x  / Bili: x / Urobili: x   Blood: x / Protein: x / Nitrite: x   Leuk Esterase: x / RBC: x / WBC x   Sq Epi: x / Non Sq Epi: x / Bacteria: x      MEDICATIONS  (STANDING):  amLODIPine   Tablet 10 milliGRAM(s) Oral daily  ammonium lactate 12% Lotion 1 Application(s) Topical two times a day  apixaban 5 milliGRAM(s) Oral two times a day  ascorbic acid 500 milliGRAM(s) Oral daily  bisacodyl 10 milliGRAM(s) Oral at bedtime  capsaicin 0.025% Cream 1 Application(s) Topical four times a day  clotrimazole 1% Cream 1 Application(s) Topical <User Schedule>  Dakins Solution - 1/4 Strength 1 Application(s) Topical two times a day  dextrose 5%. 1000 milliLiter(s) (100 mL/Hr) IV Continuous <Continuous>  dextrose 5%. 1000 milliLiter(s) (50 mL/Hr) IV Continuous <Continuous>  dextrose 50% Injectable 25 Gram(s) IV Push once  dextrose 50% Injectable 12.5 Gram(s) IV Push once  dextrose 50% Injectable 25 Gram(s) IV Push once  DULoxetine 60 milliGRAM(s) Oral daily  gabapentin 1200 milliGRAM(s) Oral every 8 hours  glucagon  Injectable 1 milliGRAM(s) IntraMuscular once  influenza   Vaccine 0.5 milliLiter(s) IntraMuscular once  lidocaine   4% Patch 1 Patch Transdermal daily  lidocaine   4% Patch 1 Patch Transdermal daily  melatonin 3 milliGRAM(s) Oral at bedtime  multivitamin 1 Tablet(s) Oral daily  mupirocin 2% Ointment 1 Application(s) Topical two times a day  naloxegol 25 milliGRAM(s) Oral daily  pantoprazole    Tablet 40 milliGRAM(s) Oral before breakfast  piperacillin/tazobactam IVPB.. 3.375 Gram(s) IV Intermittent every 8 hours  polyethylene glycol 3350 17 Gram(s) Oral two times a day  povidone iodine 10% Solution 1 Application(s) Topical daily  QUEtiapine 25 milliGRAM(s) Oral at bedtime  sevelamer carbonate 800 milliGRAM(s) Oral three times a day with meals  tamsulosin 0.4 milliGRAM(s) Oral at bedtime  zinc sulfate 220 milliGRAM(s) Oral daily    MEDICATIONS  (PRN):  acetaminophen     Tablet .. 650 milliGRAM(s) Oral every 6 hours PRN Temp greater or equal to 38C (100.4F), Mild Pain (1 - 3), Moderate Pain (4 - 6)  albuterol/ipratropium for Nebulization 3 milliLiter(s) Nebulizer every 6 hours PRN Shortness of Breath and/or Wheezing  cyclobenzaprine 5 milliGRAM(s) Oral three times a day PRN Muscle Spasm  dextrose Oral Gel 15 Gram(s) Oral once PRN Blood Glucose LESS THAN 70 milliGRAM(s)/deciliter  oxyCODONE    IR 5 milliGRAM(s) Oral two times a day PRN Severe Pain (7 - 10) related to dressing change  oxyCODONE    IR 15 milliGRAM(s) Oral every 6 hours PRN Severe Pain (7 - 10)     Subjective  Seen and examined at bedside this AM.  Girlfriend present.   Admits to sleeping well with use of cpap.   Experiencing muscle soreness from therapy sessions.   Per therapy, experiencing urinary retention.   RNs requesting Plastic Surgery to re-eval for possible wound vac.   Discussed pRAFO boots at HS.    ROS--no acute pain, no fever or chest pain   B/l leg and sacral ulcers  LBM 4/30    Function   Pt performed lean from side to side to off load wounds.  Pt performed  // bars by 6 trials, Pt unable to performed full stand for  first 3 trails. Pt able to satnd up to 75% on next 3 trails with difficulty with  hip ext and glute activation. pt able to stand from   15" with Assist at ITs for  hip ext and at b/l knee for block on stand. Pt reports feeling more pain in  sacrum and feels it is limiting is strength    Vital Signs Last 24 Hrs  T(C): 36.9 (01 May 2025 07:20), Max: 36.9 (30 Apr 2025 22:09)  T(F): 98.4 (01 May 2025 07:20), Max: 98.4 (30 Apr 2025 22:09)  HR: 102 (01 May 2025 07:20) (101 - 116)  BP: 117/74 (01 May 2025 07:20) (117/74 - 128/83)  RR: 16 (01 May 2025 07:20) (16 - 16)  SpO2: 97% (01 May 2025 07:20) (95% - 97%)      EXAM   Gen -  Comfortable, o  HEENT - NCAT, EOMI, MMM  Neck - Supple, No limited ROM  Pulm - CTAB, No wheeze, No rhonchi, No crackles  Cardiovascular - RRR, S1S2, No murmurs  Abdomen - Soft, non tender, +BS  Extremities - chronic skin changes consistent with elephantiasis, midline present LUE  Calf tenderness is mild  Edema reducing     Neuro-  AAO X 3, Speech is normal     Motor - UE 5/5                    LEFT    LE - HF 3/5, KE 2/5 limited by bed size and body habitus, DF 1/5, PF 5/5                    RIGHT LE - HF 3/5, KE 2/5 limited by bed size and body habitus , DF 1/5, PF 5/5        Sensory - Decreased to light touch bilateral lower extremities      Coordination - impaired lower extremities   Psychiatric - Mood stable, Affect WNL    Skin:  R foot plantar aspect lateral/distal side 7cm x 5.5cm ischemic wound from pressors  RLE 4th digit 1x1.5cm ischemic induced wound from pressors  RLE 5th digit 3x2cm ischemic induced wound from pressors  L foot plantar aspect lateral/distal side 6x7cm ischemic induced wound from pressor usage  LLE 4th digit 2x1cm ischemic induced wound from pressors  LLE 5th digit 2.5x3cm ischemic induced wound from pressors   R groin skin tear 1x1.5cm  Prior PEG insertion site 0.5x1cm   Former trach site 1x0.5cm  Unstageable pressure wound cocyx 7cmx4.5cm w/ 2.5cm depth  R buttock pressure induced wound semi contiguous with coccyx wound calling unstageable given prior debridement 8.5x5.5cm  L buttock 1.0x0.5cm stage 3 pressure injury  R buttock skin tear 3.5cm x 0.5cm and 1.5cmx0.5cm    LABS:                        8.3    5.90  )-----------( 325      ( 01 May 2025 06:58 )             29.0     05-01    140  |  104  |  13  ----------------------------<  97  3.6   |  27  |  1.03    Ca    9.4      01 May 2025 06:58  Phos  5.8     05-01    TPro  7.4  /  Alb  2.1[L]  /  TBili  0.4  /  DBili  x   /  AST  19  /  ALT  18  /  AlkPhos  49  05-01      Urinalysis Basic - ( 01 May 2025 06:58 )    Color: x / Appearance: x / SG: x / pH: x  Gluc: 97 mg/dL / Ketone: x  / Bili: x / Urobili: x   Blood: x / Protein: x / Nitrite: x   Leuk Esterase: x / RBC: x / WBC x   Sq Epi: x / Non Sq Epi: x / Bacteria: x      MEDICATIONS  (STANDING):  amLODIPine   Tablet 10 milliGRAM(s) Oral daily  ammonium lactate 12% Lotion 1 Application(s) Topical two times a day  apixaban 5 milliGRAM(s) Oral two times a day  ascorbic acid 500 milliGRAM(s) Oral daily  bisacodyl 10 milliGRAM(s) Oral at bedtime  capsaicin 0.025% Cream 1 Application(s) Topical four times a day  clotrimazole 1% Cream 1 Application(s) Topical <User Schedule>  Dakins Solution - 1/4 Strength 1 Application(s) Topical two times a day  dextrose 5%. 1000 milliLiter(s) (100 mL/Hr) IV Continuous <Continuous>  dextrose 5%. 1000 milliLiter(s) (50 mL/Hr) IV Continuous <Continuous>  dextrose 50% Injectable 25 Gram(s) IV Push once  dextrose 50% Injectable 12.5 Gram(s) IV Push once  dextrose 50% Injectable 25 Gram(s) IV Push once  DULoxetine 60 milliGRAM(s) Oral daily  gabapentin 1200 milliGRAM(s) Oral every 8 hours  glucagon  Injectable 1 milliGRAM(s) IntraMuscular once  influenza   Vaccine 0.5 milliLiter(s) IntraMuscular once  lidocaine   4% Patch 1 Patch Transdermal daily  lidocaine   4% Patch 1 Patch Transdermal daily  melatonin 3 milliGRAM(s) Oral at bedtime  multivitamin 1 Tablet(s) Oral daily  mupirocin 2% Ointment 1 Application(s) Topical two times a day  naloxegol 25 milliGRAM(s) Oral daily  pantoprazole    Tablet 40 milliGRAM(s) Oral before breakfast  piperacillin/tazobactam IVPB.. 3.375 Gram(s) IV Intermittent every 8 hours  polyethylene glycol 3350 17 Gram(s) Oral two times a day  povidone iodine 10% Solution 1 Application(s) Topical daily  QUEtiapine 25 milliGRAM(s) Oral at bedtime  sevelamer carbonate 800 milliGRAM(s) Oral three times a day with meals  tamsulosin 0.4 milliGRAM(s) Oral at bedtime  zinc sulfate 220 milliGRAM(s) Oral daily    MEDICATIONS  (PRN):  acetaminophen     Tablet .. 650 milliGRAM(s) Oral every 6 hours PRN Temp greater or equal to 38C (100.4F), Mild Pain (1 - 3), Moderate Pain (4 - 6)  albuterol/ipratropium for Nebulization 3 milliLiter(s) Nebulizer every 6 hours PRN Shortness of Breath and/or Wheezing  cyclobenzaprine 5 milliGRAM(s) Oral three times a day PRN Muscle Spasm  dextrose Oral Gel 15 Gram(s) Oral once PRN Blood Glucose LESS THAN 70 milliGRAM(s)/deciliter  oxyCODONE    IR 5 milliGRAM(s) Oral two times a day PRN Severe Pain (7 - 10) related to dressing change  oxyCODONE    IR 15 milliGRAM(s) Oral every 6 hours PRN Severe Pain (7 - 10)

## 2025-05-01 NOTE — PROGRESS NOTE ADULT - ASSESSMENT
39 year old male with PMH of morbid obesity, BEA, pAFIB (not on AC), HTN, and BL papilledema attributed to pseudotumor cerebri; who initially presented to  on 2/24 with SOB and BL LE edema. Found to have URI with progressive SOB and multifocal PNA on imaging. His hospital course was complicated by worsening respiratory distress and increased 02 demands secondary to secretions (requiring multiple intubation attempts) and eventual MICU admission. While in MICU, he 02 requirements continued despite optimal vent management. Concern noted for progressive ARDS, unable to prone given morbid obesity, and ultimately cannulated for vvECMO on 2/25 and transferred to [facility] [place] for further management on 2/26. His hospital course at [facility] was significant for the following: diuretic and ABX management, s/p trach and PEG (placed on 3/7- PEG since removed), RCU admission, high grade fevers (secondary to panniculitis), progressively worsening sacral ulcer (likely osteomyelitis- s/p surgical debridement), BL foot wound (secondary to pressor use), neuropathy (secondary to critical illness polyneuropathy), ELLA (secondary to vancomycin toxicity and overdiuresis- since DCd- with improvement). Patient now admitted for a multidisciplinary rehab program. 04-25-25 @ 15:19    #Debility Secondary to Acute Hypoxic Respiratory Failure/ARDS 2/2 PNA  #BEA (Not on CPAP)  #Critical Illness Polyneuropathy   -s/p VV ECMO, s/p diuresis, TTE/ROBERT with RV failure, s/p treatment for pneumonia  -Repeat Echo 3/11 showed EF 66 %. RV is not well visualized, enlarged RV cavity size a/ reduced RV systolic function. No significant valvular disease.  No echocardiographic evidence of pulmonary hypertension.  -s/p Trach (decannulated 3/28) and PEG (removed 4/15)   -Saturating well on RA  -Continue duoneb PRN   -BIPAP QHS (FiO2 40%, 18/10) via nasal mask   -Fall precaution  -Pain control and bowel regimen per rehab team  -Comprehensive rehab program with PT/OT/SLP  -Outpatient pulm follow up     #Possible Sacral Osteomyelitis  -Continue Zosyn TID - last dose evening of 5/11/25    #Fungemia 2/2 Panniculitis  -Blood culture 3/15 and 3/16 with candida albicans  -s/p course of antifungals   -Repeat cultures negative since 3/18    #Chronic Paroxysmal AFIB  #Sinus Tachycardia  -Eliquis 5mg BID   -Not on AVN blocker   -EKG 4/28 showed sinus tachycardia, no acute acute ST/T wave changes  -TSH WNL 2/25  -Sinus tachycardia likely multifactorial including pain and deconditioning. Low suspicion for PE, no SOB/hypoxia and he is already on full AC    #RIJ and Bilateral LE DVT  -Duplex RUE 3/14 showed Acute, non-occlusive deep vein thrombosis noted within the right internal jugular vein. Superficial vein thrombosis noted within the right antecubital cephalic vein.  -Duplex LE 3/14 showed Acute, occlusive deep vein thromboses noted within the right and left peroneal veins at both mid calves. Age-indeterminate, occlusive deep vein thrombosis visualized within the left gastrocnemius vein at the proximal calf.  -Continue eliquis  -Repeat duplex 4/29 showed No evidence of deep venous thrombosis in either lower extremity.    #Normocytic Anemia   -Likely multifactorial  -H/H stable, no evidence of active bleed   -Monitor CBC     #HTN  -Continue Amlodipine  -Monitor BP     #Hypokalemia (Resolved)  -s/p repletion  -Monitor BMP    #Sleep/Mood  -Continue Melatonin 3mg at HS   -Continue Quetiapine 25mg at HS     #History of Pseudotumor Cerebri   -Outpatient follow up     #Stage IV Sacral Decub Ulcer  #Bilateral Buttocks Wounds  #Bilateral Foot Wounds  -Continue local wound care  -Off load pressure  -MVI, vitamin C and zinc   -Plastic consult following - for wound vac to be placed to sacral ulcer due to increased drainage     #ELLA (Improved)  -Baseline Cr appears to be ~0.8 range   -ELLA felt to be multifactorial in setting of cardiorenal w/ RVE, recurrent ELLA suspected due to vancomycin toxicity and diuresis   -Cr mild uptrend to 1.45 (4/28) -> 1.16 on 4/29 -> 1.03 on 5/1   -Continue to encourage oral hydration   -Continue Renvela 800mg TID, monitor phos level  -Low phos diet  -Monitor BMP    #Type 2 Diabetes  -HbA1C 6.7 on 2/25, Repeat HbA1C 4.9 on 4/28  -FS trend reviewed, has been within goal  -RAISS discontinued  -Monitor glucose on routine lab, controlled     #/Urinary Retention  -Started on flomax 5/1  -Monitor bladder scans     #GI PPx  -PPI    #DVT PPx  -On Eliquis    Discussed with rehab team

## 2025-05-01 NOTE — PROGRESS NOTE ADULT - ASSESSMENT
39 year old male with PMH of morbid obesity, BEA on CPAP, pAFIB (not on AC), HTN, and BL papilledema attributed to pseudotumor cerebri; who initially presented to  on 2/24 with SOB and BL LE edema. Found to have URI with progressive SOB and multifocal PNA on imaging. His hospital course was complicated by worsening respiratory distress and increased 02 demands secondary to secretions (requiring multiple intubation attempts) and eventual MICU admission. While in MICU, he 02 requirements continued despite optimal vent management. Concern noted for progressive ARDS, unable to prone given morbid obesity, and ultimately cannulated for vvECMO on 2/25 and transferred to [facility] for further management on 2/26.  His hospital course at [facility] was significant for the following: diuretic and ABX management, s/p trache and PEG (placed on 3/7- PEG since removed), RCU admission, high grade fevers (secondary to panniculitis), progressively worsening sacral ulcer (likely osteomyelitis- s/p surgical debridement), BL foot wound (secondary to pressor use), neuropathy (secondary to critical illness polyneuropathy), ELLA (secondary to vancomycin toxicity and overdiuresis- since DCd- with improvement). Patient now admitted for a multidisciplinary rehab program. 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 Pat dry. Cover with small silicone foam with border. Change every other day or prn if soiled     Sacral/gluteal cleft to bilateral buttocks- Irrigate deep cavity and tunneling with Dakins solution 1/4 strength using peribottle or flushes, cleanse wound and satellite ulcerations to b/l buttocks with NS, Dry well. Apply Liquid barrier film to periwound skin. Apply TRIAD barrier paste to isolated ulcer overlying left buttock,  Apply Aquacel hydrofiber sheet to right buttock, pack sacral/gluteal cleft including deep tunnels with Dakins 1/4 strength moistened kerlix, leave at least 2" out at end to ensure full removal of packing with subsequent dressing changes. Cover entire area (sacrum and right buttock) with abdominal pad and tegaderm. Change twice a day and prn if soiled/compromised. Once dressing is in place and perineal care is provided, clean remaining skin with perineal spray, apply TRIAD moisture barrier cream to exposed skin every shift and prn.    Bilateral abdominal pannus, bilateral groin: Cleanse with luke-warm soap and water, dry well. Apply clotrimazole cream daily, followed by Interdry textile sheeting, under intertriginous folds leaving 2 inches exposed at ends to wick, remove to wash & dry affected area, then replace. Individual sheeting may be used for up to 5 days unless soiled. Inspect skin daily.     - L foot wound- betadine to followed by 4x4 gauze, ABD pad, and matilde daily  - R foot wound- mupirocin BID     -Continue to offload pressure; bariatric low airloss support surface, turn and position per protocol with use of fluidized positioning devices, continue incontinence and moisture care per protocol and use of single breathable incontinence pads, continue to offload heels with fluidized positioning devices/Ochoa-Lock positioning device (PS# [id number]). Continue use of bariatric seat cushion (people soft #[id number]) and limit sit time- no more than 2 consecutive hours at any given time.   - Pressure injury/Skin: OOB to Chair, PT/OT   - Consulting plastic surgery - RNs requesting Plastic re-eval for possible wound vac   - Ammonium lactate to BL LEs     #Neuropathy  - Cymbalta 60mg daily   - Gabapentin 1200mg TID     #Pain Mgmt   - Capsaicin cream to BL feet QID - Avoid use on damaged, broken, or irritated skin. Avoid occlusive dressing or heat   - Lidocaine patch to BL thighs   - PRN: Cyclobenzaprine 5mg TID; Oxycodone 5mg BID; Oxycodone 15mg daily (prior to dressing change); Tylenol 650mg QID     #GI/Bowel Mgmt   - Pantoprazole  - Bisacodyl 10mg at HS   - Miralax   - Naloxegal 25mg daily     #/Bladder Mgmt   #ELLA  #Urinary Retention  - Renvela 800mg TID  - Cr improving   - Bladder scan TID  - Start Flomax 0.4mg at HS     #FEN --Morbid obesity (BMI > 40).  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 Holder Liaison with Family/Providers:    4/30--Girlfriend present at bedside, provided collateral hx and update on patient's function   Discussed treatment plan with her   -----------------------------------------------  OUTPATIENT/FOLLOW UP:    Your Primary Care Provider,   Phone: (   )    -  Fax: (   )    -  Follow Up Time: 1 week    [facility] [place],   [address] [place] [address]  Phone: [phone]  Fax: (   )    -  Follow Up Time:    Dr. Waterhouse  Podiatry  [phone]  Within 1 week   -----------------------------------------------    39 year old male with PMH of morbid obesity, BEA on CPAP, pAFIB (not on AC), HTN, and BL papilledema attributed to pseudotumor cerebri; who initially presented to  on 2/24 with SOB and BL LE edema. Found to have URI with progressive SOB and multifocal PNA on imaging. His hospital course was complicated by worsening respiratory distress and increased 02 demands secondary to secretions (requiring multiple intubation attempts) and eventual MICU admission. While in MICU, he 02 requirements continued despite optimal vent management. Concern noted for progressive ARDS, unable to prone given morbid obesity, and ultimately cannulated for vvECMO on 2/25 and transferred to [facility] for further management on 2/26.  His hospital course at [facility] was significant for the following: diuretic and ABX management, s/p trache and PEG (placed on 3/7- PEG since removed), RCU admission, high grade fevers (secondary to panniculitis), progressively worsening sacral ulcer (likely osteomyelitis- s/p surgical debridement), BL foot wound (secondary to pressor use), neuropathy (secondary to critical illness polyneuropathy), ELLA (secondary to vancomycin toxicity and overdiuresis- since DCd- with improvement). Patient now admitted for a multidisciplinary rehab program. 04-25-25 @ 15:19    * ELLA - Cr improving   * Pastic surg consult for sacral wound and b/l leg ulcers - RNs requesting re-eval for possible wound vac   * Trache site healing   * Urinary Retention - start Flomax 0.4mg - bladder scan   * PRAFO boots at HS   * On cyclobenzaprine TID--will evaluate continued need for this       #Critical illness myopathy due to respiratory failure and sepsis  (present on admission)   - Gait Instability, ADL impairments and Functional impairments: start Comprehensive Rehab Program of PT/OT  - 3 hours a day, 5 days a week   - PRN: Nebulizer QID     BEA on BIPAP  --via nasal canula qhs    #Sacral Osteomyelitis  - Zosyn TID - last dose evening of 5/11/25    #Paroxysmal AFIB  - Eliquis 5mg BID     #Calf Tenderness  - BL LE doppler negative     #HTN  - Amlodipine 10mg dialy     #Sleep/Mood  - Melatonin 3mg at HS   - Quetiapine 25mg at HS     #Skin  - LUQ surgical wound previous site of PEG tube and right groin- Clean wound and periwound skin with NS. Pat dry. Cover with small silicone foam with border. Change every other day or prn if soiled     Sacral/gluteal cleft to bilateral buttocks- Irrigate deep cavity and tunneling with Dakins solution 1/4 strength using peribottle or flushes, cleanse wound and satellite ulcerations to b/l buttocks with NS, Dry well. Apply Liquid barrier film to periwound skin. Apply TRIAD barrier paste to isolated ulcer overlying left buttock,  Apply Aquacel hydrofiber sheet to right buttock, pack sacral/gluteal cleft including deep tunnels with Dakins 1/4 strength moistened kerlix, leave at least 2" out at end to ensure full removal of packing with subsequent dressing changes. Cover entire area (sacrum and right buttock) with abdominal pad and tegaderm. Change twice a day and prn if soiled/compromised. Once dressing is in place and perineal care is provided, clean remaining skin with perineal spray, apply TRIAD moisture barrier cream to exposed skin every shift and prn.    Bilateral abdominal pannus, bilateral groin: Cleanse with luke-warm soap and water, dry well. Apply clotrimazole cream daily, followed by Interdry textile sheeting, under intertriginous folds leaving 2 inches exposed at ends to wick, remove to wash & dry affected area, then replace. Individual sheeting may be used for up to 5 days unless soiled. Inspect skin daily.     - L foot wound- betadine to followed by 4x4 gauze, ABD pad, and matilde daily  - R foot wound- mupirocin BID     -Continue to offload pressure; bariatric low airloss support surface, turn and position per protocol with use of fluidized positioning devices, continue incontinence and moisture care per protocol and use of single breathable incontinence pads, continue to offload heels with fluidized positioning devices/Ochoa-Lock positioning device (PS# [id number]). Continue use of bariatric seat cushion (people soft #[id number]) and limit sit time- no more than 2 consecutive hours at any given time.   - Pressure injury/Skin: OOB to Chair, PT/OT   - Consulting plastic surgery - RNs requesting Plastic re-eval for possible wound vac   - Ammonium lactate to BL LEs     #Neuropathy  - Cymbalta 60mg daily   - Gabapentin 1200mg TID     #Pain Mgmt   - Capsaicin cream to BL feet QID - Avoid use on damaged, broken, or irritated skin. Avoid occlusive dressing or heat   - Lidocaine patch to BL thighs   - PRN: Cyclobenzaprine 5mg TID; Oxycodone 5mg BID; Oxycodone 15mg daily (prior to dressing change); Tylenol 650mg QID     #GI/Bowel Mgmt   - Pantoprazole  - Bisacodyl 10mg at HS   - Miralax   - Naloxegal 25mg daily     #/Bladder Mgmt   #ELLA  #Urinary Retention  - Renvela 800mg TID  - Cr improving   - Bladder scan TID  - Start Flomax 0.4mg at HS     #FEN --Morbid obesity (BMI > 40).  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 Holder Liaison with Family/Providers:    4/30--Girlfriend present at bedside, provided collateral hx and update on patient's function   Discussed treatment plan with her   -----------------------------------------------  OUTPATIENT/FOLLOW UP:    Your Primary Care Provider,   Phone: (   )    -  Fax: (   )    -  Follow Up Time: 1 week    [facility],   [address] [place] [address]  Phone: [phone]  Fax: (   )    -  Follow Up Time:    Dr. Waterhouse  Podiatry  [phone]  Within 1 week   -----------------------------------------------

## 2025-05-01 NOTE — PROGRESS NOTE ADULT - SUBJECTIVE AND OBJECTIVE BOX
Interval History  Patient seen and examined at bedside. No acute events noted.  Patient denies any new complaints this morning.     ALLERGIES:  No Known Allergies    MEDICATIONS  (STANDING):  amLODIPine   Tablet 10 milliGRAM(s) Oral daily  ammonium lactate 12% Lotion 1 Application(s) Topical two times a day  apixaban 5 milliGRAM(s) Oral two times a day  ascorbic acid 500 milliGRAM(s) Oral daily  bisacodyl 10 milliGRAM(s) Oral at bedtime  capsaicin 0.025% Cream 1 Application(s) Topical four times a day  clotrimazole 1% Cream 1 Application(s) Topical <User Schedule>  Dakins Solution - 1/4 Strength 1 Application(s) Topical two times a day  dextrose 5%. 1000 milliLiter(s) (100 mL/Hr) IV Continuous <Continuous>  dextrose 5%. 1000 milliLiter(s) (50 mL/Hr) IV Continuous <Continuous>  dextrose 50% Injectable 25 Gram(s) IV Push once  dextrose 50% Injectable 12.5 Gram(s) IV Push once  dextrose 50% Injectable 25 Gram(s) IV Push once  DULoxetine 60 milliGRAM(s) Oral daily  gabapentin 1200 milliGRAM(s) Oral every 8 hours  glucagon  Injectable 1 milliGRAM(s) IntraMuscular once  influenza   Vaccine 0.5 milliLiter(s) IntraMuscular once  lidocaine   4% Patch 1 Patch Transdermal daily  lidocaine   4% Patch 1 Patch Transdermal daily  melatonin 3 milliGRAM(s) Oral at bedtime  multivitamin 1 Tablet(s) Oral daily  mupirocin 2% Ointment 1 Application(s) Topical two times a day  naloxegol 25 milliGRAM(s) Oral daily  pantoprazole    Tablet 40 milliGRAM(s) Oral before breakfast  piperacillin/tazobactam IVPB.. 3.375 Gram(s) IV Intermittent every 8 hours  polyethylene glycol 3350 17 Gram(s) Oral two times a day  povidone iodine 10% Solution 1 Application(s) Topical daily  QUEtiapine 25 milliGRAM(s) Oral at bedtime  sevelamer carbonate 800 milliGRAM(s) Oral three times a day with meals  tamsulosin 0.4 milliGRAM(s) Oral at bedtime  zinc sulfate 220 milliGRAM(s) Oral daily    MEDICATIONS  (PRN):  acetaminophen     Tablet .. 650 milliGRAM(s) Oral every 6 hours PRN Temp greater or equal to 38C (100.4F), Mild Pain (1 - 3), Moderate Pain (4 - 6)  albuterol/ipratropium for Nebulization 3 milliLiter(s) Nebulizer every 6 hours PRN Shortness of Breath and/or Wheezing  cyclobenzaprine 5 milliGRAM(s) Oral three times a day PRN Muscle Spasm  dextrose Oral Gel 15 Gram(s) Oral once PRN Blood Glucose LESS THAN 70 milliGRAM(s)/deciliter  oxyCODONE    IR 5 milliGRAM(s) Oral two times a day PRN Severe Pain (7 - 10) related to dressing change  oxyCODONE    IR 15 milliGRAM(s) Oral every 6 hours PRN Severe Pain (7 - 10)    Vital Signs Last 24 Hrs  T(F): 98.4 (01 May 2025 07:20), Max: 98.4 (30 Apr 2025 22:09)  HR: 102 (01 May 2025 07:20) (101 - 116)  BP: 117/74 (01 May 2025 07:20) (117/74 - 128/83)  RR: 16 (01 May 2025 07:20) (16 - 16)  SpO2: 97% (01 May 2025 07:20) (95% - 97%)  I&O's Summary    BMI (kg/m2): 58.6 (04-29-25 @ 14:12)    PHYSICAL EXAM:  GENERAL: NAD  HEENT: NCAT, trach stoma covered with gauze   CHEST/LUNG: Clear to percussion bilaterally; No rales, rhonchi, wheezing  HEART: Regular rate and rhythm; No murmurs  ABDOMEN: Soft, Nontender, Nondistended; Bowel sounds present, previous PEG insertion site covered with dressing   MUSCULOSKELETAL/EXTREMITIES:  2+ Peripheral Pulses, No LE edema  PSYCH: Appropriate affect  NEURO: Alert & Oriented x 3    I personally reviewed the below data/images/labs:    LABS:                        8.3    5.90  )-----------( 325      ( 01 May 2025 06:58 )             29.0       05-01    140  |  104  |  13  ----------------------------<  97  3.6   |  27  |  1.03    Ca    9.4      01 May 2025 06:58  Phos  5.8     05-01    TPro  7.4  /  Alb  2.1  /  TBili  0.4  /  DBili  x   /  AST  19  /  ALT  18  /  AlkPhos  49  05-01 03-08 Chol -- LDL -- HDL -- Trig 169 mg/dL    Urinalysis Basic - ( 01 May 2025 06:58 )    Color: x / Appearance: x / SG: x / pH: x  Gluc: 97 mg/dL / Ketone: x  / Bili: x / Urobili: x   Blood: x / Protein: x / Nitrite: x   Leuk Esterase: x / RBC: x / WBC x   Sq Epi: x / Non Sq Epi: x / Bacteria: x    COVID-19 PCR: NotDetec (04-25-25 @ 18:31)    Consultant(s) Notes Reviewed:   Care Discused with Consultants/Other Providers:  Imaging Personally Reviewed:

## 2025-05-02 PROCEDURE — 99232 SBSQ HOSP IP/OBS MODERATE 35: CPT

## 2025-05-02 PROCEDURE — 99233 SBSQ HOSP IP/OBS HIGH 50: CPT

## 2025-05-02 RX ORDER — OXYCODONE HYDROCHLORIDE 30 MG/1
15 TABLET ORAL EVERY 6 HOURS
Refills: 0 | Status: DISCONTINUED | OUTPATIENT
Start: 2025-05-02 | End: 2025-05-05

## 2025-05-02 RX ORDER — OXYCODONE HYDROCHLORIDE 30 MG/1
5 TABLET ORAL
Refills: 0 | Status: DISCONTINUED | OUTPATIENT
Start: 2025-05-02 | End: 2025-05-02

## 2025-05-02 RX ORDER — OXYCODONE HYDROCHLORIDE 30 MG/1
10 TABLET ORAL
Refills: 0 | Status: DISCONTINUED | OUTPATIENT
Start: 2025-05-02 | End: 2025-05-05

## 2025-05-02 RX ORDER — OXYCODONE HYDROCHLORIDE 30 MG/1
10 TABLET ORAL
Refills: 0 | Status: DISCONTINUED | OUTPATIENT
Start: 2025-05-02 | End: 2025-05-02

## 2025-05-02 RX ADMIN — Medication 3 MILLIGRAM(S): at 21:23

## 2025-05-02 RX ADMIN — GABAPENTIN 1200 MILLIGRAM(S): 400 CAPSULE ORAL at 21:23

## 2025-05-02 RX ADMIN — OXYCODONE HYDROCHLORIDE 5 MILLIGRAM(S): 30 TABLET ORAL at 13:55

## 2025-05-02 RX ADMIN — MUPIROCIN CALCIUM 1 APPLICATION(S): 20 CREAM TOPICAL at 17:36

## 2025-05-02 RX ADMIN — DULOXETINE 60 MILLIGRAM(S): 20 CAPSULE, DELAYED RELEASE ORAL at 13:58

## 2025-05-02 RX ADMIN — OXYCODONE HYDROCHLORIDE 15 MILLIGRAM(S): 30 TABLET ORAL at 18:34

## 2025-05-02 RX ADMIN — QUETIAPINE FUMARATE 25 MILLIGRAM(S): 25 TABLET ORAL at 21:24

## 2025-05-02 RX ADMIN — Medication 40 MILLIGRAM(S): at 06:59

## 2025-05-02 RX ADMIN — LIDOCAINE HYDROCHLORIDE 1 PATCH: 20 JELLY TOPICAL at 13:57

## 2025-05-02 RX ADMIN — GABAPENTIN 1200 MILLIGRAM(S): 400 CAPSULE ORAL at 06:59

## 2025-05-02 RX ADMIN — SEVELAMER HYDROCHLORIDE 800 MILLIGRAM(S): 800 TABLET ORAL at 13:57

## 2025-05-02 RX ADMIN — Medication 1 APPLICATION(S): at 17:38

## 2025-05-02 RX ADMIN — APIXABAN 5 MILLIGRAM(S): 2.5 TABLET, FILM COATED ORAL at 17:34

## 2025-05-02 RX ADMIN — Medication 25 GRAM(S): at 07:07

## 2025-05-02 RX ADMIN — SEVELAMER HYDROCHLORIDE 800 MILLIGRAM(S): 800 TABLET ORAL at 17:35

## 2025-05-02 RX ADMIN — APIXABAN 5 MILLIGRAM(S): 2.5 TABLET, FILM COATED ORAL at 06:59

## 2025-05-02 RX ADMIN — AMLODIPINE BESYLATE 10 MILLIGRAM(S): 10 TABLET ORAL at 06:59

## 2025-05-02 RX ADMIN — LIDOCAINE HYDROCHLORIDE 1 PATCH: 20 JELLY TOPICAL at 21:37

## 2025-05-02 RX ADMIN — CLOTRIMAZOLE 1 APPLICATION(S): 1 CREAM TOPICAL at 14:04

## 2025-05-02 RX ADMIN — NALOXEGOL OXALATE 25 MILLIGRAM(S): 12.5 TABLET, FILM COATED ORAL at 13:56

## 2025-05-02 RX ADMIN — OXYCODONE HYDROCHLORIDE 15 MILLIGRAM(S): 30 TABLET ORAL at 09:48

## 2025-05-02 RX ADMIN — TAMSULOSIN HYDROCHLORIDE 0.4 MILLIGRAM(S): 0.4 CAPSULE ORAL at 21:24

## 2025-05-02 RX ADMIN — OXYCODONE HYDROCHLORIDE 15 MILLIGRAM(S): 30 TABLET ORAL at 00:18

## 2025-05-02 RX ADMIN — Medication 1 APPLICATION(S): at 14:04

## 2025-05-02 RX ADMIN — SEVELAMER HYDROCHLORIDE 800 MILLIGRAM(S): 800 TABLET ORAL at 08:38

## 2025-05-02 RX ADMIN — SODIUM HYPOCHLORITE 1 APPLICATION(S): 0.12 SOLUTION TOPICAL at 17:35

## 2025-05-02 RX ADMIN — OXYCODONE HYDROCHLORIDE 15 MILLIGRAM(S): 30 TABLET ORAL at 08:48

## 2025-05-02 RX ADMIN — LIDOCAINE HYDROCHLORIDE 1 PATCH: 20 JELLY TOPICAL at 19:37

## 2025-05-02 RX ADMIN — Medication 220 MILLIGRAM(S): at 13:55

## 2025-05-02 RX ADMIN — Medication 1 TABLET(S): at 13:58

## 2025-05-02 RX ADMIN — Medication 25 GRAM(S): at 21:23

## 2025-05-02 RX ADMIN — CYCLOBENZAPRINE HYDROCHLORIDE 5 MILLIGRAM(S): 15 CAPSULE, EXTENDED RELEASE ORAL at 17:40

## 2025-05-02 RX ADMIN — Medication 10 MILLIGRAM(S): at 21:24

## 2025-05-02 RX ADMIN — Medication 25 GRAM(S): at 13:59

## 2025-05-02 RX ADMIN — Medication 500 MILLIGRAM(S): at 13:56

## 2025-05-02 RX ADMIN — OXYCODONE HYDROCHLORIDE 5 MILLIGRAM(S): 30 TABLET ORAL at 14:50

## 2025-05-02 RX ADMIN — GABAPENTIN 1200 MILLIGRAM(S): 400 CAPSULE ORAL at 13:55

## 2025-05-02 RX ADMIN — OXYCODONE HYDROCHLORIDE 15 MILLIGRAM(S): 30 TABLET ORAL at 17:34

## 2025-05-02 NOTE — CONSULT NOTE ADULT - TIME BILLING
as above
Time spent includes direct patient care (interview and examination of patient), discussion with other providers, support staff and/or patient's family members, review of medical records, ordering diagnostic tests and analyzing results, and documentation

## 2025-05-02 NOTE — CONSULT NOTE ADULT - SUBJECTIVE AND OBJECTIVE BOX
Hospitalist note:    History of Present Illness:   Sunny Quintanilla is a 39 year old male with PMH of morbid obesity, BEA on CPAP, pAFIB (not on AC), HTN, and BL papilledema attributed to pseudotumor cerebri; who initially presented to  on 2/24 with SOB and BL LE edema. Found to have URI with progressive SOB and multifocal PNA on imaging. His hospital course was complicated by worsening respiratory distress and increased 02 demands secondary to secretions (requiring multiple intubation attempts) and eventual MICU admission. While in MICU, he 02 requirements continued despite optimal vent management. Concern noted for progressive ARDS, unable to prone given morbid obesity, and ultimately cannulated for vvECMO on 2/25 and transferred to [facility] for further management on 2/26.    His hospital course at [facility] was significant for the following: diuretic and ABX management, s/p trache and PEG (placed on 3/7- PEG since removed), RCU admission, high grade fevers (secondary to panniculitis), progressively worsening sacral ulcer (likely osteomyelitis- s/p surgical debridement), BL foot wound (secondary to pressor use), neuropathy (secondary to critical illness polyneuropathy), ELLA (secondary to vancomycin toxicity and overdiuresis- since DCd- with improvement). PM&R was consulted and deemed him an appropriate candidate for IRF. He was medically stabilized and cleared for discharge to [facility].             my note:    called to see pt because of incidental finding of "pneumatosis in cecum" on ct scan      patient resting in bed, fully awake and alert, family present    no complaints    eating and moving bowels    no abdominal pain    looks in no distress    afenrile    vitals stable    Heent-sclera anicteric    abdomen benign    labs:    wbc 5  h/h 8/29    electrolytes normal    imaging(I personally reviewed):    < from: CT Abdomen and Pelvis w/wo IV Cont (05.01.25 @ 20:32) >  PRESSION:  An air and fluid containing collection in the right gluteus zurdo   muscle in continuity with a subcutaneous sacral collection. No obvious   skin defect to suggest a decubitus ulcer.  Question of pneumatosis in the cecum with no other evidence of bowel   ischemia. Correlation with lactate levels and clinical exam would be   helpful.    < end of copied text >

## 2025-05-02 NOTE — PROGRESS NOTE ADULT - SUBJECTIVE AND OBJECTIVE BOX
Subjective  Seen and examined at bedside this AM.  Admits to sleeping well with use of cpap.   Experiencing L hip pain.  Await CT AP results, per Plastic Surgery.   Discussed L hip xray.     ROS--no acute pain, no fever or chest pain   B/l leg and sacral ulcers  LBM 5/1    Function   Pt performed lean from side to side to off load wounds.  Pt performed  // bars by 6 trials, Pt unable to performed full stand for  first 3 trails. Pt able to satnd up to 75% on next 3 trails with difficulty with  hip ext and glute activation. pt able to stand from   15" with Assist at ITs for  hip ext and at b/l knee for block on stand. Pt reports feeling more pain in  sacrum and feels it is limiting is strength    Vital Signs Last 24 Hrs  T(C): 36.7 (02 May 2025 07:40), Max: 36.8 (01 May 2025 21:57)  T(F): 98.1 (02 May 2025 07:40), Max: 98.3 (01 May 2025 21:57)  HR: 109 (02 May 2025 07:40) (100 - 120)  BP: 118/75 (02 May 2025 07:40) (118/75 - 122/75)  BP(mean): --  RR: 16 (02 May 2025 07:40) (16 - 16)  SpO2: 95% (02 May 2025 07:40) (95% - 95%)      EXAM   Gen -  Comfortable, o  HEENT - NCAT, EOMI, MMM  Neck - Supple, No limited ROM  Pulm - CTAB, No wheeze, No rhonchi, No crackles  Cardiovascular - RRR, S1S2, No murmurs  Abdomen - Soft, non tender, +BS  Extremities - chronic skin changes consistent with elephantiasis, midline present LUE  Calf tenderness is mild  Edema reducing     Neuro-  AAO X 3, Speech is normal     Motor - UE 5/5                    LEFT    LE - HF 3/5, KE 2/5 limited by bed size and body habitus, DF 1/5, PF 5/5                    RIGHT LE - HF 3/5, KE 2/5 limited by bed size and body habitus , DF 1/5, PF 5/5        Sensory - Decreased to light touch bilateral lower extremities      Coordination - impaired lower extremities   Psychiatric - Mood stable, Affect WNL    Skin:  R foot plantar aspect lateral/distal side 7cm x 5.5cm ischemic wound from pressors  RLE 4th digit 1x1.5cm ischemic induced wound from pressors  RLE 5th digit 3x2cm ischemic induced wound from pressors  L foot plantar aspect lateral/distal side 6x7cm ischemic induced wound from pressor usage  LLE 4th digit 2x1cm ischemic induced wound from pressors  LLE 5th digit 2.5x3cm ischemic induced wound from pressors   R groin skin tear 1x1.5cm  Prior PEG insertion site 0.5x1cm   Former trach site 1x0.5cm  Unstageable pressure wound cocyx 7cmx4.5cm w/ 2.5cm depth  R buttock pressure induced wound semi contiguous with coccyx wound calling unstageable given prior debridement 8.5x5.5cm  L buttock 1.0x0.5cm stage 3 pressure injury  R buttock skin tear 3.5cm x 0.5cm and 1.5cmx0.5cm    LABS:                        8.3    5.90  )-----------( 325      ( 01 May 2025 06:58 )             29.0     05-01    140  |  104  |  13  ----------------------------<  97  3.6   |  27  |  1.03    Ca    9.4      01 May 2025 06:58  Phos  5.8     05-01    TPro  7.4  /  Alb  2.1[L]  /  TBili  0.4  /  DBili  x   /  AST  19  /  ALT  18  /  AlkPhos  49  05-01      Urinalysis Basic - ( 01 May 2025 06:58 )    Color: x / Appearance: x / SG: x / pH: x  Gluc: 97 mg/dL / Ketone: x  / Bili: x / Urobili: x   Blood: x / Protein: x / Nitrite: x   Leuk Esterase: x / RBC: x / WBC x   Sq Epi: x / Non Sq Epi: x / Bacteria: x        MEDICATIONS  (STANDING):  amLODIPine   Tablet 10 milliGRAM(s) Oral daily  ammonium lactate 12% Lotion 1 Application(s) Topical two times a day  apixaban 5 milliGRAM(s) Oral two times a day  ascorbic acid 500 milliGRAM(s) Oral daily  bisacodyl 10 milliGRAM(s) Oral at bedtime  capsaicin 0.025% Cream 1 Application(s) Topical four times a day  clotrimazole 1% Cream 1 Application(s) Topical <User Schedule>  Dakins Solution - 1/4 Strength 1 Application(s) Topical two times a day  dextrose 5%. 1000 milliLiter(s) (50 mL/Hr) IV Continuous <Continuous>  dextrose 5%. 1000 milliLiter(s) (100 mL/Hr) IV Continuous <Continuous>  dextrose 50% Injectable 25 Gram(s) IV Push once  dextrose 50% Injectable 12.5 Gram(s) IV Push once  dextrose 50% Injectable 25 Gram(s) IV Push once  DULoxetine 60 milliGRAM(s) Oral daily  gabapentin 1200 milliGRAM(s) Oral every 8 hours  glucagon  Injectable 1 milliGRAM(s) IntraMuscular once  influenza   Vaccine 0.5 milliLiter(s) IntraMuscular once  lidocaine   4% Patch 1 Patch Transdermal daily  lidocaine   4% Patch 1 Patch Transdermal daily  melatonin 3 milliGRAM(s) Oral at bedtime  multivitamin 1 Tablet(s) Oral daily  mupirocin 2% Ointment 1 Application(s) Topical two times a day  naloxegol 25 milliGRAM(s) Oral daily  pantoprazole    Tablet 40 milliGRAM(s) Oral before breakfast  piperacillin/tazobactam IVPB.. 3.375 Gram(s) IV Intermittent every 8 hours  polyethylene glycol 3350 17 Gram(s) Oral two times a day  povidone iodine 10% Solution 1 Application(s) Topical daily  QUEtiapine 25 milliGRAM(s) Oral at bedtime  sevelamer carbonate 800 milliGRAM(s) Oral three times a day with meals  tamsulosin 0.4 milliGRAM(s) Oral at bedtime  zinc sulfate 220 milliGRAM(s) Oral daily    MEDICATIONS  (PRN):  acetaminophen     Tablet .. 650 milliGRAM(s) Oral every 6 hours PRN Temp greater or equal to 38C (100.4F), Mild Pain (1 - 3), Moderate Pain (4 - 6)  albuterol/ipratropium for Nebulization 3 milliLiter(s) Nebulizer every 6 hours PRN Shortness of Breath and/or Wheezing  cyclobenzaprine 5 milliGRAM(s) Oral three times a day PRN Muscle Spasm  dextrose Oral Gel 15 Gram(s) Oral once PRN Blood Glucose LESS THAN 70 milliGRAM(s)/deciliter  oxyCODONE    IR 5 milliGRAM(s) Oral two times a day PRN Severe Pain (7 - 10) related to dressing change  oxyCODONE    IR 15 milliGRAM(s) Oral every 6 hours PRN Severe Pain (7 - 10)     Subjective  Seen and examined at bedside this AM.  Admits to sleeping well with use of cpap.   Experiencing L hip pain.  Await CT AP results, per Plastic Surgery.   Discussed L hip xray.     ROS--no acute pain, no fever or chest pain   B/l leg and sacral ulcers  LBM 5/1    Function   Supine in bed AAROM right lower extremity only hip flexion/extension, SAQ,  gravity eliminated hip abduction/adduction 3 x 10 reps each.  Bilateral quad and  glut sets 3 x 10 reps each.  Red resistance band bilateral shoulder extension and scapular retraction 3 x 10  reps each.    Vital Signs Last 24 Hrs  T(C): 36.7 (02 May 2025 07:40), Max: 36.8 (01 May 2025 21:57)  T(F): 98.1 (02 May 2025 07:40), Max: 98.3 (01 May 2025 21:57)  HR: 109 (02 May 2025 07:40) (100 - 120)  BP: 118/75 (02 May 2025 07:40) (118/75 - 122/75)  RR: 16 (02 May 2025 07:40) (16 - 16)  SpO2: 95% (02 May 2025 07:40) (95% - 95%)      EXAM   Gen -  Comfortable, o  HEENT - NCAT, EOMI, MMM  Neck - Supple, No limited ROM  Pulm - CTAB, No wheeze, No rhonchi, No crackles  Cardiovascular - RRR, S1S2, No murmurs  Abdomen - Soft, non tender, +BS  Extremities - chronic skin changes consistent with elephantiasis, midline present LUE  Calf tenderness is mild  Edema reducing     Neuro-  AAO X 3, Speech is normal     Motor - UE 5/5                    LEFT    LE - HF 3/5, KE 2/5 limited by bed size and body habitus, DF 1/5, PF 5/5                    RIGHT LE - HF 3/5, KE 2/5 limited by bed size and body habitus , DF 1/5, PF 5/5        Sensory - Decreased to light touch bilateral lower extremities      Coordination - impaired lower extremities   Psychiatric - Mood stable, Affect WNL    Skin:  R foot plantar aspect lateral/distal side 7cm x 5.5cm ischemic wound from pressors  RLE 4th digit 1x1.5cm ischemic induced wound from pressors  RLE 5th digit 3x2cm ischemic induced wound from pressors  L foot plantar aspect lateral/distal side 6x7cm ischemic induced wound from pressor usage  LLE 4th digit 2x1cm ischemic induced wound from pressors  LLE 5th digit 2.5x3cm ischemic induced wound from pressors   R groin skin tear 1x1.5cm  Prior PEG insertion site 0.5x1cm   Former trach site 1x0.5cm  Unstageable pressure wound cocyx 7cmx4.5cm w/ 2.5cm depth  R buttock pressure induced wound semi contiguous with coccyx wound calling unstageable given prior debridement 8.5x5.5cm  L buttock 1.0x0.5cm stage 3 pressure injury  R buttock skin tear 3.5cm x 0.5cm and 1.5cmx0.5cm    LABS:                        8.3    5.90  )-----------( 325      ( 01 May 2025 06:58 )             29.0     05-01    140  |  104  |  13  ----------------------------<  97  3.6   |  27  |  1.03    Ca    9.4      01 May 2025 06:58  Phos  5.8     05-01    TPro  7.4  /  Alb  2.1[L]  /  TBili  0.4  /  DBili  x   /  AST  19  /  ALT  18  /  AlkPhos  49  05-01      Urinalysis Basic - ( 01 May 2025 06:58 )    Color: x / Appearance: x / SG: x / pH: x  Gluc: 97 mg/dL / Ketone: x  / Bili: x / Urobili: x   Blood: x / Protein: x / Nitrite: x   Leuk Esterase: x / RBC: x / WBC x   Sq Epi: x / Non Sq Epi: x / Bacteria: x        MEDICATIONS  (STANDING):  amLODIPine   Tablet 10 milliGRAM(s) Oral daily  ammonium lactate 12% Lotion 1 Application(s) Topical two times a day  apixaban 5 milliGRAM(s) Oral two times a day  ascorbic acid 500 milliGRAM(s) Oral daily  bisacodyl 10 milliGRAM(s) Oral at bedtime  capsaicin 0.025% Cream 1 Application(s) Topical four times a day  clotrimazole 1% Cream 1 Application(s) Topical <User Schedule>  Dakins Solution - 1/4 Strength 1 Application(s) Topical two times a day  dextrose 5%. 1000 milliLiter(s) (50 mL/Hr) IV Continuous <Continuous>  dextrose 5%. 1000 milliLiter(s) (100 mL/Hr) IV Continuous <Continuous>  dextrose 50% Injectable 25 Gram(s) IV Push once  dextrose 50% Injectable 12.5 Gram(s) IV Push once  dextrose 50% Injectable 25 Gram(s) IV Push once  DULoxetine 60 milliGRAM(s) Oral daily  gabapentin 1200 milliGRAM(s) Oral every 8 hours  glucagon  Injectable 1 milliGRAM(s) IntraMuscular once  influenza   Vaccine 0.5 milliLiter(s) IntraMuscular once  lidocaine   4% Patch 1 Patch Transdermal daily  lidocaine   4% Patch 1 Patch Transdermal daily  melatonin 3 milliGRAM(s) Oral at bedtime  multivitamin 1 Tablet(s) Oral daily  mupirocin 2% Ointment 1 Application(s) Topical two times a day  naloxegol 25 milliGRAM(s) Oral daily  pantoprazole    Tablet 40 milliGRAM(s) Oral before breakfast  piperacillin/tazobactam IVPB.. 3.375 Gram(s) IV Intermittent every 8 hours  polyethylene glycol 3350 17 Gram(s) Oral two times a day  povidone iodine 10% Solution 1 Application(s) Topical daily  QUEtiapine 25 milliGRAM(s) Oral at bedtime  sevelamer carbonate 800 milliGRAM(s) Oral three times a day with meals  tamsulosin 0.4 milliGRAM(s) Oral at bedtime  zinc sulfate 220 milliGRAM(s) Oral daily    MEDICATIONS  (PRN):  acetaminophen     Tablet .. 650 milliGRAM(s) Oral every 6 hours PRN Temp greater or equal to 38C (100.4F), Mild Pain (1 - 3), Moderate Pain (4 - 6)  albuterol/ipratropium for Nebulization 3 milliLiter(s) Nebulizer every 6 hours PRN Shortness of Breath and/or Wheezing  cyclobenzaprine 5 milliGRAM(s) Oral three times a day PRN Muscle Spasm  dextrose Oral Gel 15 Gram(s) Oral once PRN Blood Glucose LESS THAN 70 milliGRAM(s)/deciliter  oxyCODONE    IR 5 milliGRAM(s) Oral two times a day PRN Severe Pain (7 - 10) related to dressing change  oxyCODONE    IR 15 milliGRAM(s) Oral every 6 hours PRN Severe Pain (7 - 10)

## 2025-05-02 NOTE — CONSULT NOTE ADULT - ASSESSMENT
a/p    benign abdomen    may resume po diet      (after discharge, please refer patient for bariatric surgery evaluation as per his request  dr botlelo at [place] or Dr. Segovia at [place]).      thank you    dr mary jane caballero  [facility]#[phone]

## 2025-05-02 NOTE — PROGRESS NOTE ADULT - ASSESSMENT
39 year old male with PMH of morbid obesity, BEA, pAFIB (not on AC), HTN, and BL papilledema attributed to pseudotumor cerebri; who initially presented to  on 2/24 with SOB and BL LE edema. Found to have URI with progressive SOB and multifocal PNA on imaging. His hospital course was complicated by worsening respiratory distress and increased 02 demands secondary to secretions (requiring multiple intubation attempts) and eventual MICU admission. While in MICU, he 02 requirements continued despite optimal vent management. Concern noted for progressive ARDS, unable to prone given morbid obesity, and ultimately cannulated for vvECMO on 2/25 and transferred to [facility] for further management on 2/26. His hospital course at [facility] was significant for the following: diuretic and ABX management, s/p trach and PEG (placed on 3/7- PEG since removed), RCU admission, high grade fevers (secondary to panniculitis), progressively worsening sacral ulcer (likely osteomyelitis- s/p surgical debridement), BL foot wound (secondary to pressor use), neuropathy (secondary to critical illness polyneuropathy), ELLA (secondary to vancomycin toxicity and overdiuresis- since DCd- with improvement). Patient now admitted for a multidisciplinary rehab program. 04-25-25 @ 15:19    left hip pain  - f/u hip xray  - pain control, PT per primary    #Debility Secondary to Acute Hypoxic Respiratory Failure/ARDS 2/2 PNA  #BEA (Not on CPAP)  #Critical Illness Polyneuropathy   -s/p VV ECMO, s/p diuresis, TTE/ROBERT with RV failure, s/p treatment for pneumonia  -Repeat Echo 3/11 showed EF 66 %. RV is not well visualized, enlarged RV cavity size a/ reduced RV systolic function. No significant valvular disease.  No echocardiographic evidence of pulmonary hypertension.  -s/p Trach (decannulated 3/28) and PEG (removed 4/15)   -Saturating well on RA  -Continue duoneb PRN   -BIPAP QHS (FiO2 40%, 18/10) via nasal mask   -Fall precaution  -Pain control and bowel regimen per rehab team  -Comprehensive rehab program with PT/OT/SLP  -Outpatient pulm follow up     #Possible Sacral Osteomyelitis  -Continue Zosyn TID - last dose evening of 5/11/25    #Fungemia 2/2 Panniculitis  -Blood culture 3/15 and 3/16 with candida albicans  -s/p course of antifungals   -Repeat cultures negative since 3/18    #Chronic AFIB  #Sinus Tachycardia  -Eliquis 5mg BID   -Not on AVN blocker   -EKG 4/28 showed sinus tachycardia, no acute acute ST/T wave changes  -TSH WNL 2/25  -Sinus tachycardia likely multifactorial including pain and deconditioning. Low suspicion for PE, no SOB/hypoxia and he is already on full AC    #RIJ and Bilateral LE DVT  -Duplex RUE 3/14 showed Acute, non-occlusive deep vein thrombosis noted within the right internal jugular vein. Superficial vein thrombosis noted within the right antecubital cephalic vein.  -Duplex LE 3/14 showed Acute, occlusive deep vein thromboses noted within the right and left peroneal veins at both mid calves. Age-indeterminate, occlusive deep vein thrombosis visualized within the left gastrocnemius vein at the proximal calf.  -Continue eliquis  -Repeat duplex 4/29 showed No evidence of deep venous thrombosis in either lower extremity.    #Normocytic Anemia   -Likely multifactorial  -H/H stable, no evidence of active bleed   -Monitor CBC     #HTN  -Continue Amlodipine  -Monitor BP     #Hypokalemia (Resolved)  -s/p repletion  -Monitor BMP    #Sleep/Mood  -Continue Melatonin 3mg at HS   -Continue Quetiapine 25mg at HS     #History of Pseudotumor Cerebri   -Outpatient follow up     #Stage IV Sacral Decub Ulcer  #Bilateral Buttocks Wounds  #Bilateral Foot Wounds  -Continue local wound care  -Off load pressure  -MVI, vitamin C and zinc   -Plastic consult following - for wound vac to be placed to sacral ulcer due to increased drainage . plastic requested Ct A/p    #ELLA (Improved)  -Baseline Cr appears to be ~0.8 range   -ELLA felt to be multifactorial in setting of cardiorenal w/ RVE, recurrent ELLA suspected due to vancomycin toxicity and diuresis   -Cr mild uptrend to 1.45 (4/28) -> 1.16 on 4/29 -> 1.03 on 5/1   -Continue to encourage oral hydration   -Continue Renvela 800mg TID, monitor phos level  -Low phos diet  -Monitor BMP    #Type 2 Diabetes  -HbA1C 6.7 on 2/25, Repeat HbA1C 4.9 on 4/28  -FS trend reviewed, has been within goal  -RAISS discontinued  -Monitor glucose on routine lab, controlled     #/Urinary Retention  -on flomax 5/1  -Monitor bladder scans     #GI PPx  -PPI    #DVT PPx  -On Eliquis    Discussed with Susanna MEHTA and  rehab team at [facility]

## 2025-05-02 NOTE — PROGRESS NOTE ADULT - SUBJECTIVE AND OBJECTIVE BOX
Medicine Progress Note    Patient is a 39y old  Male who presents with a chief complaint of Debility secondary to acute hypoxic respiratory failure (02 May 2025 11:40)      SUBJECTIVE / OVERNIGHT EVENTS:  seen and examined  Chart reviewed  No overnight events  Limb weakness improving with therapy  BM+  left hip pain. no other complaints      ROS:  denied fever/chills/CP/SOB/cough/palpitation/dizziness/abdominal pian/nausea/vomiting/diarrhoea/constipation/dysuria/leg or calf pain/headaches.all other ROS neg    MEDICATIONS  (STANDING):  amLODIPine   Tablet 10 milliGRAM(s) Oral daily  ammonium lactate 12% Lotion 1 Application(s) Topical two times a day  apixaban 5 milliGRAM(s) Oral two times a day  ascorbic acid 500 milliGRAM(s) Oral daily  bisacodyl 10 milliGRAM(s) Oral at bedtime  capsaicin 0.025% Cream 1 Application(s) Topical four times a day  clotrimazole 1% Cream 1 Application(s) Topical <User Schedule>  Dakins Solution - 1/4 Strength 1 Application(s) Topical two times a day  dextrose 5%. 1000 milliLiter(s) (50 mL/Hr) IV Continuous <Continuous>  dextrose 5%. 1000 milliLiter(s) (100 mL/Hr) IV Continuous <Continuous>  dextrose 50% Injectable 25 Gram(s) IV Push once  dextrose 50% Injectable 12.5 Gram(s) IV Push once  dextrose 50% Injectable 25 Gram(s) IV Push once  DULoxetine 60 milliGRAM(s) Oral daily  gabapentin 1200 milliGRAM(s) Oral every 8 hours  glucagon  Injectable 1 milliGRAM(s) IntraMuscular once  influenza   Vaccine 0.5 milliLiter(s) IntraMuscular once  lidocaine   4% Patch 1 Patch Transdermal daily  lidocaine   4% Patch 1 Patch Transdermal daily  melatonin 3 milliGRAM(s) Oral at bedtime  multivitamin 1 Tablet(s) Oral daily  mupirocin 2% Ointment 1 Application(s) Topical two times a day  naloxegol 25 milliGRAM(s) Oral daily  pantoprazole    Tablet 40 milliGRAM(s) Oral before breakfast  piperacillin/tazobactam IVPB.. 3.375 Gram(s) IV Intermittent every 8 hours  polyethylene glycol 3350 17 Gram(s) Oral two times a day  povidone iodine 10% Solution 1 Application(s) Topical daily  QUEtiapine 25 milliGRAM(s) Oral at bedtime  sevelamer carbonate 800 milliGRAM(s) Oral three times a day with meals  tamsulosin 0.4 milliGRAM(s) Oral at bedtime  zinc sulfate 220 milliGRAM(s) Oral daily    MEDICATIONS  (PRN):  acetaminophen     Tablet .. 650 milliGRAM(s) Oral every 6 hours PRN Temp greater or equal to 38C (100.4F), Mild Pain (1 - 3), Moderate Pain (4 - 6)  albuterol/ipratropium for Nebulization 3 milliLiter(s) Nebulizer every 6 hours PRN Shortness of Breath and/or Wheezing  cyclobenzaprine 5 milliGRAM(s) Oral three times a day PRN Muscle Spasm  dextrose Oral Gel 15 Gram(s) Oral once PRN Blood Glucose LESS THAN 70 milliGRAM(s)/deciliter  oxyCODONE    IR 5 milliGRAM(s) Oral two times a day PRN Severe Pain (7 - 10) related to dressing change  oxyCODONE    IR 15 milliGRAM(s) Oral every 6 hours PRN Severe Pain (7 - 10)    CAPILLARY BLOOD GLUCOSE        I&O's Summary    01 May 2025 07:01  -  02 May 2025 07:00  --------------------------------------------------------  IN: 0 mL / OUT: 500 mL / NET: -500 mL        PHYSICAL EXAM:  Vital Signs Last 24 Hrs  T(C): 36.7 (02 May 2025 07:40), Max: 36.8 (01 May 2025 21:57)  T(F): 98.1 (02 May 2025 07:40), Max: 98.3 (01 May 2025 21:57)  HR: 109 (02 May 2025 07:40) (100 - 120)  BP: 118/75 (02 May 2025 07:40) (118/75 - 122/75)  BP(mean): --  RR: 16 (02 May 2025 07:40) (16 - 16)  SpO2: 95% (02 May 2025 07:40) (95% - 95%)    Parameters below as of 02 May 2025 07:40  Patient On (Oxygen Delivery Method): room air    GENERAL: Not in distress. Alert . morbidly obese  HEENT: clear conjuctiva, MMM. no pallor or icterus  CARDIOVASCULAR: RRR S1, S2. No murmur/rubs/gallop  LUNGS: BLAE+, no rales, no wheezing, no rhonchi.    ABDOMEN: ND. Soft,  NT, no guarding / rebound / rigidity. BS normoactive  BACK: No spine tenderness.  EXTREMITIES: no edema. no leg or calf TP. no hip TP  SKIN: warm and dry. multiple skin wounds  PSYCHIATRIC: Calm.  No agitation.  CNS: AAO. moves limbs, follows commands    LABS:                        8.3    5.90  )-----------( 325      ( 01 May 2025 06:58 )             29.0     05-01    140  |  104  |  13  ----------------------------<  97  3.6   |  27  |  1.03    Ca    9.4      01 May 2025 06:58  Phos  5.8     05-01    TPro  7.4  /  Alb  2.1[L]  /  TBili  0.4  /  DBili  x   /  AST  19  /  ALT  18  /  AlkPhos  49  05-01          Urinalysis Basic - ( 01 May 2025 06:58 )    Color: x / Appearance: x / SG: x / pH: x  Gluc: 97 mg/dL / Ketone: x  / Bili: x / Urobili: x   Blood: x / Protein: x / Nitrite: x   Leuk Esterase: x / RBC: x / WBC x   Sq Epi: x / Non Sq Epi: x / Bacteria: x        COVID-19 PCR: NotDetec (25 Apr 2025 18:31)  SARS-CoV-2: NotDetec (16 Mar 2025 16:35)  SARS-CoV-2: NotDetec (12 Mar 2025 07:19)  SARS-CoV-2: NotDetec (26 Feb 2025 02:27)  SARS-CoV-2: NotDetec (24 Feb 2025 18:19)      RADIOLOGY & ADDITIONAL TESTS:  Imaging from Last 24 Hours:    Electrocardiogram/QTc Interval:    COORDINATION OF CARE:  Care Discussed with Consultants/Other Providers:

## 2025-05-03 DIAGNOSIS — R93.89 ABNORMAL FINDINGS ON DIAGNOSTIC IMAGING OF OTHER SPECIFIED BODY STRUCTURES: ICD-10-CM

## 2025-05-03 PROCEDURE — 73501 X-RAY EXAM HIP UNI 1 VIEW: CPT | Mod: 26,LT

## 2025-05-03 PROCEDURE — 99233 SBSQ HOSP IP/OBS HIGH 50: CPT

## 2025-05-03 PROCEDURE — 93010 ELECTROCARDIOGRAM REPORT: CPT

## 2025-05-03 RX ADMIN — OXYCODONE HYDROCHLORIDE 15 MILLIGRAM(S): 30 TABLET ORAL at 10:06

## 2025-05-03 RX ADMIN — Medication 25 GRAM(S): at 05:36

## 2025-05-03 RX ADMIN — GABAPENTIN 1200 MILLIGRAM(S): 400 CAPSULE ORAL at 05:35

## 2025-05-03 RX ADMIN — APIXABAN 5 MILLIGRAM(S): 2.5 TABLET, FILM COATED ORAL at 05:36

## 2025-05-03 RX ADMIN — DULOXETINE 60 MILLIGRAM(S): 20 CAPSULE, DELAYED RELEASE ORAL at 11:18

## 2025-05-03 RX ADMIN — NALOXEGOL OXALATE 25 MILLIGRAM(S): 12.5 TABLET, FILM COATED ORAL at 11:20

## 2025-05-03 RX ADMIN — TAMSULOSIN HYDROCHLORIDE 0.4 MILLIGRAM(S): 0.4 CAPSULE ORAL at 22:38

## 2025-05-03 RX ADMIN — OXYCODONE HYDROCHLORIDE 10 MILLIGRAM(S): 30 TABLET ORAL at 13:30

## 2025-05-03 RX ADMIN — SEVELAMER HYDROCHLORIDE 800 MILLIGRAM(S): 800 TABLET ORAL at 11:31

## 2025-05-03 RX ADMIN — APIXABAN 5 MILLIGRAM(S): 2.5 TABLET, FILM COATED ORAL at 17:42

## 2025-05-03 RX ADMIN — OXYCODONE HYDROCHLORIDE 15 MILLIGRAM(S): 30 TABLET ORAL at 22:38

## 2025-05-03 RX ADMIN — Medication 1 APPLICATION(S): at 17:40

## 2025-05-03 RX ADMIN — Medication 25 GRAM(S): at 14:54

## 2025-05-03 RX ADMIN — MUPIROCIN CALCIUM 1 APPLICATION(S): 20 CREAM TOPICAL at 17:40

## 2025-05-03 RX ADMIN — SEVELAMER HYDROCHLORIDE 800 MILLIGRAM(S): 800 TABLET ORAL at 17:43

## 2025-05-03 RX ADMIN — Medication 1 TABLET(S): at 11:20

## 2025-05-03 RX ADMIN — CYCLOBENZAPRINE HYDROCHLORIDE 5 MILLIGRAM(S): 15 CAPSULE, EXTENDED RELEASE ORAL at 22:37

## 2025-05-03 RX ADMIN — LIDOCAINE HYDROCHLORIDE 1 PATCH: 20 JELLY TOPICAL at 19:54

## 2025-05-03 RX ADMIN — GABAPENTIN 1200 MILLIGRAM(S): 400 CAPSULE ORAL at 22:37

## 2025-05-03 RX ADMIN — Medication 3 MILLIGRAM(S): at 22:38

## 2025-05-03 RX ADMIN — Medication 1 APPLICATION(S): at 11:14

## 2025-05-03 RX ADMIN — QUETIAPINE FUMARATE 25 MILLIGRAM(S): 25 TABLET ORAL at 22:38

## 2025-05-03 RX ADMIN — LIDOCAINE HYDROCHLORIDE 1 PATCH: 20 JELLY TOPICAL at 01:35

## 2025-05-03 RX ADMIN — Medication 500 MILLIGRAM(S): at 11:20

## 2025-05-03 RX ADMIN — GABAPENTIN 1200 MILLIGRAM(S): 400 CAPSULE ORAL at 14:53

## 2025-05-03 RX ADMIN — AMLODIPINE BESYLATE 10 MILLIGRAM(S): 10 TABLET ORAL at 05:36

## 2025-05-03 RX ADMIN — OXYCODONE HYDROCHLORIDE 15 MILLIGRAM(S): 30 TABLET ORAL at 23:38

## 2025-05-03 RX ADMIN — OXYCODONE HYDROCHLORIDE 15 MILLIGRAM(S): 30 TABLET ORAL at 09:19

## 2025-05-03 RX ADMIN — OXYCODONE HYDROCHLORIDE 10 MILLIGRAM(S): 30 TABLET ORAL at 12:30

## 2025-05-03 RX ADMIN — LIDOCAINE HYDROCHLORIDE 1 PATCH: 20 JELLY TOPICAL at 11:19

## 2025-05-03 RX ADMIN — OXYCODONE HYDROCHLORIDE 15 MILLIGRAM(S): 30 TABLET ORAL at 00:27

## 2025-05-03 RX ADMIN — MUPIROCIN CALCIUM 1 APPLICATION(S): 20 CREAM TOPICAL at 05:37

## 2025-05-03 RX ADMIN — SEVELAMER HYDROCHLORIDE 800 MILLIGRAM(S): 800 TABLET ORAL at 09:19

## 2025-05-03 RX ADMIN — Medication 25 GRAM(S): at 23:10

## 2025-05-03 RX ADMIN — Medication 220 MILLIGRAM(S): at 11:20

## 2025-05-03 RX ADMIN — Medication 40 MILLIGRAM(S): at 05:35

## 2025-05-03 RX ADMIN — Medication 1 APPLICATION(S): at 05:37

## 2025-05-03 RX ADMIN — OXYCODONE HYDROCHLORIDE 15 MILLIGRAM(S): 30 TABLET ORAL at 01:27

## 2025-05-03 RX ADMIN — CLOTRIMAZOLE 1 APPLICATION(S): 1 CREAM TOPICAL at 11:20

## 2025-05-03 RX ADMIN — CYCLOBENZAPRINE HYDROCHLORIDE 5 MILLIGRAM(S): 15 CAPSULE, EXTENDED RELEASE ORAL at 00:34

## 2025-05-03 NOTE — PROGRESS NOTE ADULT - SUBJECTIVE AND OBJECTIVE BOX
Cc: Gait dysfunction    HPI: Patient with no new medical issues today.  Patient     Pain controlled, no chest pain, no N/V, no Fevers/Chills.    Has been tolerating rehabilitation program.    MEDICATIONS  (STANDING):  amLODIPine   Tablet 10 milliGRAM(s) Oral daily  ammonium lactate 12% Lotion 1 Application(s) Topical two times a day  apixaban 5 milliGRAM(s) Oral two times a day  ascorbic acid 500 milliGRAM(s) Oral daily  bisacodyl 10 milliGRAM(s) Oral at bedtime  capsaicin 0.025% Cream 1 Application(s) Topical four times a day  clotrimazole 1% Cream 1 Application(s) Topical <User Schedule>  Dakins Solution - 1/4 Strength 1 Application(s) Topical two times a day  dextrose 5%. 1000 milliLiter(s) (50 mL/Hr) IV Continuous <Continuous>  dextrose 5%. 1000 milliLiter(s) (100 mL/Hr) IV Continuous <Continuous>  dextrose 50% Injectable 25 Gram(s) IV Push once  dextrose 50% Injectable 12.5 Gram(s) IV Push once  dextrose 50% Injectable 25 Gram(s) IV Push once  DULoxetine 60 milliGRAM(s) Oral daily  gabapentin 1200 milliGRAM(s) Oral every 8 hours  glucagon  Injectable 1 milliGRAM(s) IntraMuscular once  influenza   Vaccine 0.5 milliLiter(s) IntraMuscular once  lidocaine   4% Patch 1 Patch Transdermal daily  lidocaine   4% Patch 1 Patch Transdermal daily  melatonin 3 milliGRAM(s) Oral at bedtime  multivitamin 1 Tablet(s) Oral daily  mupirocin 2% Ointment 1 Application(s) Topical two times a day  naloxegol 25 milliGRAM(s) Oral daily  pantoprazole    Tablet 40 milliGRAM(s) Oral before breakfast  piperacillin/tazobactam IVPB.. 3.375 Gram(s) IV Intermittent every 8 hours  polyethylene glycol 3350 17 Gram(s) Oral two times a day  povidone iodine 10% Solution 1 Application(s) Topical daily  QUEtiapine 25 milliGRAM(s) Oral at bedtime  sevelamer carbonate 800 milliGRAM(s) Oral three times a day with meals  tamsulosin 0.4 milliGRAM(s) Oral at bedtime  zinc sulfate 220 milliGRAM(s) Oral daily    MEDICATIONS  (PRN):  acetaminophen     Tablet .. 650 milliGRAM(s) Oral every 6 hours PRN Temp greater or equal to 38C (100.4F), Mild Pain (1 - 3), Moderate Pain (4 - 6)  albuterol/ipratropium for Nebulization 3 milliLiter(s) Nebulizer every 6 hours PRN Shortness of Breath and/or Wheezing  cyclobenzaprine 5 milliGRAM(s) Oral three times a day PRN Muscle Spasm  dextrose Oral Gel 15 Gram(s) Oral once PRN Blood Glucose LESS THAN 70 milliGRAM(s)/deciliter  oxyCODONE    IR 15 milliGRAM(s) Oral every 6 hours PRN Severe Pain (7 - 10)  oxyCODONE    IR 10 milliGRAM(s) Oral two times a day PRN Moderate Pain (4 - 6)      Vital Signs Last 24 Hrs  T(C): 36.3 (03 May 2025 09:04), Max: 36.9 (02 May 2025 21:40)  T(F): 97.4 (03 May 2025 09:04), Max: 98.5 (02 May 2025 21:40)  HR: 98 (03 May 2025 09:04) (98 - 115)  BP: 142/90 (03 May 2025 09:04) (114/71 - 142/90)  RR: 16 (03 May 2025 09:04) (16 - 16)  SpO2: 100% (03 May 2025 09:04) (98% - 100%)  Parameters below as of 03 May 2025 09:04  Patient On (Oxygen Delivery Method): room air      EXAM  Comfortable   HEENT- EOMI  Heart- RRR, S1S2  Lungs- Clear  Abd- + BS, non tender   Ext- No calf tenderness    Neuro- Exam; AAO X 3  Sacral ulcer- vac in situ    Imp: Patient with diagnosis of Critical Illness Myopathy admitted for comprehensive acute rehabilitation.    Plan:  - Continue therapies  - DVT prophylaxis--c/w apixiban  -HTN c/w amlodipine  - Depression c/w duloxetine  - BPH c/w flomax  - Sacral wound infection c/w zosyn c/w vac dressing MWF   - Continue current medications   - Patient is stable to continue current rehabilitation program.    Cc: Gait dysfunction    HPI: Patient with no new medical issues today.  Patient tolerating oral diet after review by surgery and recs to recommence oral diet  No abd pain     Pain controlled, no chest pain, no N/V, no Fevers/Chills.    Has been tolerating rehabilitation program.    MEDICATIONS  (STANDING):  amLODIPine   Tablet 10 milliGRAM(s) Oral daily  ammonium lactate 12% Lotion 1 Application(s) Topical two times a day  apixaban 5 milliGRAM(s) Oral two times a day  ascorbic acid 500 milliGRAM(s) Oral daily  bisacodyl 10 milliGRAM(s) Oral at bedtime  capsaicin 0.025% Cream 1 Application(s) Topical four times a day  clotrimazole 1% Cream 1 Application(s) Topical <User Schedule>  Dakins Solution - 1/4 Strength 1 Application(s) Topical two times a day  dextrose 5%. 1000 milliLiter(s) (50 mL/Hr) IV Continuous <Continuous>  dextrose 5%. 1000 milliLiter(s) (100 mL/Hr) IV Continuous <Continuous>  dextrose 50% Injectable 25 Gram(s) IV Push once  dextrose 50% Injectable 12.5 Gram(s) IV Push once  dextrose 50% Injectable 25 Gram(s) IV Push once  DULoxetine 60 milliGRAM(s) Oral daily  gabapentin 1200 milliGRAM(s) Oral every 8 hours  glucagon  Injectable 1 milliGRAM(s) IntraMuscular once  influenza   Vaccine 0.5 milliLiter(s) IntraMuscular once  lidocaine   4% Patch 1 Patch Transdermal daily  lidocaine   4% Patch 1 Patch Transdermal daily  melatonin 3 milliGRAM(s) Oral at bedtime  multivitamin 1 Tablet(s) Oral daily  mupirocin 2% Ointment 1 Application(s) Topical two times a day  naloxegol 25 milliGRAM(s) Oral daily  pantoprazole    Tablet 40 milliGRAM(s) Oral before breakfast  piperacillin/tazobactam IVPB.. 3.375 Gram(s) IV Intermittent every 8 hours  polyethylene glycol 3350 17 Gram(s) Oral two times a day  povidone iodine 10% Solution 1 Application(s) Topical daily  QUEtiapine 25 milliGRAM(s) Oral at bedtime  sevelamer carbonate 800 milliGRAM(s) Oral three times a day with meals  tamsulosin 0.4 milliGRAM(s) Oral at bedtime  zinc sulfate 220 milliGRAM(s) Oral daily    MEDICATIONS  (PRN):  acetaminophen     Tablet .. 650 milliGRAM(s) Oral every 6 hours PRN Temp greater or equal to 38C (100.4F), Mild Pain (1 - 3), Moderate Pain (4 - 6)  albuterol/ipratropium for Nebulization 3 milliLiter(s) Nebulizer every 6 hours PRN Shortness of Breath and/or Wheezing  cyclobenzaprine 5 milliGRAM(s) Oral three times a day PRN Muscle Spasm  dextrose Oral Gel 15 Gram(s) Oral once PRN Blood Glucose LESS THAN 70 milliGRAM(s)/deciliter  oxyCODONE    IR 15 milliGRAM(s) Oral every 6 hours PRN Severe Pain (7 - 10)  oxyCODONE    IR 10 milliGRAM(s) Oral two times a day PRN Moderate Pain (4 - 6)      Vital Signs Last 24 Hrs  T(C): 36.3 (03 May 2025 09:04), Max: 36.9 (02 May 2025 21:40)  T(F): 97.4 (03 May 2025 09:04), Max: 98.5 (02 May 2025 21:40)  HR: 98 (03 May 2025 09:04) (98 - 115)  BP: 142/90 (03 May 2025 09:04) (114/71 - 142/90)  RR: 16 (03 May 2025 09:04) (16 - 16)  SpO2: 100% (03 May 2025 09:04) (98% - 100%)  Parameters below as of 03 May 2025 09:04  Patient On (Oxygen Delivery Method): room air      EXAM  Comfortable   HEENT- EOMI  Heart- RRR, S1S2  Lungs- Clear  Abd- + BS, non tender   Ext- No calf tenderness    Neuro- Exam; AAO X 3  Sacral ulcer- vac in situ    < from: CT Abdomen and Pelvis w/wo IV Cont (05.01.25 @ 20:32) >  An air and fluid containing collection in the right gluteus zurdo   muscle in continuity with a subcutaneous sacral collection. No obvious   skin defect to suggest a decubitus ulcer.  Question of pneumatosis in the cecum with no other evidence of bowel   ischemia. Correlation with lactate levels and clinical exam would be   helpful.      Imp: Patient with diagnosis of Critical Illness Myopathy admitted for comprehensive acute rehabilitation.  Surgical review and recs appreciated    Plan:  - Continue therapies  - DVT prophylaxis--c/w apixiban  -HTN c/w amlodipine  - Depression c/w duloxetine  - BPH c/w flomax  - Sacral wound infection c/w zosyn c/w vac dressing MWF   - Continue current medications   - Patient is stable to continue current rehabilitation program.     Spent 52 mins, patient review, discussion of imaging and surgical recs, care co ordination

## 2025-05-03 NOTE — CONSULT NOTE ADULT - SUBJECTIVE AND OBJECTIVE BOX
Chief Complaint:  Patient is a 39y old  Male who presents with a chief complaint of Debility secondary to acute hypoxic respiratory failure (02 May 2025 20:52)        Patient is a 39y old  Male who presents with a chief complaint of Debility secondary to acute hypoxic respiratory failure (02 May 2025 20:52)      HPI:  Sunny Quintanilla is a 39 year old male with PMH of morbid obesity, BEA on CPAP, pAFIB (not on AC), HTN, and BL papilledema attributed to pseudotumor cerebri; who initially presented to  on 2/24 with SOB and BL LE edema. Found to have URI with progressive SOB and multifocal PNA on imaging. His hospital course was complicated by worsening respiratory distress and increased 02 demands secondary to secretions (requiring multiple intubation attempts) and eventual MICU admission. While in MICU, he 02 requirements continued despite optimal vent management. Concern noted for progressive ARDS, unable to prone given morbid obesity, and ultimately cannulated for vvECMO on 2/25 and transferred to [facility] for further management on 2/26.    His hospital course at [facility] was significant for the following: diuretic and ABX management, s/p trache and PEG (placed on 3/7- PEG since removed), RCU admission, high grade fevers (secondary to panniculitis), progressively worsening sacral ulcer (likely osteomyelitis- s/p surgical debridement), BL foot wound (secondary to pressor use), neuropathy (secondary to critical illness polyneuropathy), ELLA (secondary to vancomycin toxicity and overdiuresis- since DCd- with improvement). PM&R was consulted and deemed him an appropriate candidate for IRF. He was medically stabilized and cleared for discharge to [facility].  (25 Apr 2025 15:05)    GI CONSULTED- CT A/P revealing possible pneumatosis of cecum          PAST MEDICAL & SURGICAL HISTORY:  HTN (hypertension)      Atrial fibrillation  paroxysmal      Papilledema of both eyes      Chronic sinusitis      Morbid obesity      No significant past surgical history          REVIEW OF SYSTEMS:   General: Negative  HEENT: Negative  CV: Negative  Respiratory: Negative  GI: See HPI  : Negative  MSK: Negative  Hematologic: Negative  Skin: Negative    MEDICATIONS:   MEDICATIONS  (STANDING):  amLODIPine   Tablet 10 milliGRAM(s) Oral daily  ammonium lactate 12% Lotion 1 Application(s) Topical two times a day  apixaban 5 milliGRAM(s) Oral two times a day  ascorbic acid 500 milliGRAM(s) Oral daily  bisacodyl 10 milliGRAM(s) Oral at bedtime  capsaicin 0.025% Cream 1 Application(s) Topical four times a day  clotrimazole 1% Cream 1 Application(s) Topical <User Schedule>  Dakins Solution - 1/4 Strength 1 Application(s) Topical two times a day  dextrose 5%. 1000 milliLiter(s) (50 mL/Hr) IV Continuous <Continuous>  dextrose 5%. 1000 milliLiter(s) (100 mL/Hr) IV Continuous <Continuous>  dextrose 50% Injectable 25 Gram(s) IV Push once  dextrose 50% Injectable 12.5 Gram(s) IV Push once  dextrose 50% Injectable 25 Gram(s) IV Push once  DULoxetine 60 milliGRAM(s) Oral daily  gabapentin 1200 milliGRAM(s) Oral every 8 hours  glucagon  Injectable 1 milliGRAM(s) IntraMuscular once  influenza   Vaccine 0.5 milliLiter(s) IntraMuscular once  lidocaine   4% Patch 1 Patch Transdermal daily  lidocaine   4% Patch 1 Patch Transdermal daily  melatonin 3 milliGRAM(s) Oral at bedtime  multivitamin 1 Tablet(s) Oral daily  mupirocin 2% Ointment 1 Application(s) Topical two times a day  naloxegol 25 milliGRAM(s) Oral daily  pantoprazole    Tablet 40 milliGRAM(s) Oral before breakfast  piperacillin/tazobactam IVPB.. 3.375 Gram(s) IV Intermittent every 8 hours  polyethylene glycol 3350 17 Gram(s) Oral two times a day  povidone iodine 10% Solution 1 Application(s) Topical daily  QUEtiapine 25 milliGRAM(s) Oral at bedtime  sevelamer carbonate 800 milliGRAM(s) Oral three times a day with meals  tamsulosin 0.4 milliGRAM(s) Oral at bedtime  zinc sulfate 220 milliGRAM(s) Oral daily    MEDICATIONS  (PRN):  acetaminophen     Tablet .. 650 milliGRAM(s) Oral every 6 hours PRN Temp greater or equal to 38C (100.4F), Mild Pain (1 - 3), Moderate Pain (4 - 6)  albuterol/ipratropium for Nebulization 3 milliLiter(s) Nebulizer every 6 hours PRN Shortness of Breath and/or Wheezing  cyclobenzaprine 5 milliGRAM(s) Oral three times a day PRN Muscle Spasm  dextrose Oral Gel 15 Gram(s) Oral once PRN Blood Glucose LESS THAN 70 milliGRAM(s)/deciliter  oxyCODONE    IR 15 milliGRAM(s) Oral every 6 hours PRN Severe Pain (7 - 10)  oxyCODONE    IR 10 milliGRAM(s) Oral two times a day PRN Moderate Pain (4 - 6)          DIET:  Diet, Regular:   Consistent Carbohydrate Evening Snack (CSTCHOSN)  Low Sodium  No Concentrated Phosphorus  Efrain(7 Gm Arginine/7 Gm Glut/1.2 Gm HMB     Qty per Day:  BID  Supplement Feeding Modality:  Oral  Ensure Max Cans or Servings Per Day:  1       Frequency:  Two Times a day (05-03-25 @ 00:02) [Active]          ALLERGIES:   Allergies    No Known Allergies    Intolerances        Substance Use:   ( x ) never used  (  ) other:  Tobacco Usage:   ( x  ) current smoker       Family History   IBD (  ) Yes   (  ) No  GI Malignancy (  )  Yes    (  ) No    Health Management  Last Colonoscopy:Never  Last Endoscopy: Never    VITAL SIGNS:   Vital Signs Last 24 Hrs  T(C): 36.3 (03 May 2025 09:04), Max: 36.9 (02 May 2025 21:40)  T(F): 97.4 (03 May 2025 09:04), Max: 98.5 (02 May 2025 21:40)  HR: 98 (03 May 2025 09:04) (98 - 115)  BP: 142/90 (03 May 2025 09:04) (114/71 - 142/90)  BP(mean): --  RR: 16 (03 May 2025 09:04) (16 - 16)  SpO2: 100% (03 May 2025 09:04) (98% - 100%)    Parameters below as of 03 May 2025 09:04  Patient On (Oxygen Delivery Method): room air      I&O's Summary    02 May 2025 07:01  -  03 May 2025 07:00  --------------------------------------------------------  IN: 0 mL / OUT: 1400 mL / NET: -1400 mL        PHYSICAL EXAM:   GENERAL:  No acute distress  HEENT:  NC/AT, conjunctiva clear, sclera anicteric  CHEST:  No increased effort  HEART:  Regular rate  ABDOMEN:  Soft, non-tender, non-distended, no rebound or guarding  EXTREMITIES: No edema  SKIN:  Warm, dry  NEURO:  Calm, cooperative    LABS:    Hemoglobin: 8.3 g/dL (05-01-25 @ 06:58)                                                    RADIOLOGY & ADDITIONAL STUDIES:      ACC: [id number] EXAM:  CT ABDOMEN AND PELVIS WAW IC   ORDERED BY: SANDY DILLON     PROCEDURE DATE:  05/01/2025          INTERPRETATION:  CLINICAL INFORMATION: Reevaluate sacral wound.    ADDITIONAL CLINICAL INFORMATION: Other, Non-specified    COMPARISON: 4/3/2025.    CONTRAST/COMPLICATIONS:  IV Contrast: Omnipaque 350  120 cc administered   30 cc discarded  Oral Contrast: NONE  .    PROCEDURE:  CT of the Abdomen and Pelvis was performed.  Precontrast, Arterial and Delayed phases were performed.  Sagittal and coronal reformats were performed.    FINDINGS:  LOWER CHEST: Bibasilar subsegmental atelectasis.    LIVER: Steatosis.  BILE DUCTS: Normal caliber.  GALLBLADDER: Within normal limits.  SPLEEN: Within normal limits.  PANCREAS: Within normal limits.  ADRENALS: Within normal limits.  KIDNEYS/URETERS: Within normal limits.    BLADDER: Within normal limits. Trace free fluid in the presacral space is   reactive.  REPRODUCTIVE ORGANS: Prostate within normal limits.    BOWEL: No bowel obstruction. Small hiatal hernia. Appendix is normal.   Question of pneumatosis in the cecum. The bowel is otherwise normal.  PERITONEUM/RETROPERITONEUM: Within normal limits.  VESSELS: Patent mesenteric vasculature.  LYMPH NODES: No lymphadenopathy.  ABDOMINAL WALL: Subcutaneous edema in bilateral gluteal region.   Enlargement of right gluteus zurdo muscle with an air and fluid   containing collection of 9.8 x 2.4 cm which is in continuity with a   subcutaneous sacral collection of 5.8 x 4.4 cm. Noassociated skin   defect. Significant subcutaneous fatty stranding.  BONES: Degenerative changes.    IMPRESSION:  An air and fluid containing collection in the right gluteus zurdo   muscle in continuity with a subcutaneous sacral collection. No obvious   skin defect to suggest a decubitus ulcer.  Question of pneumatosis in the cecum with no other evidence of bowel   ischemia. Correlation with lactate levels and clinical exam would be   helpful.    --- End of Report ---            ADAL ABARCA MD; Attending Radiologist  This document has been electronically signed. May  2 2025  1:48PM  05-01-25 @ 20:32           Chief Complaint:  Patient is a 39y old  Male who presents with a chief complaint of Debility secondary to acute hypoxic respiratory failure (02 May 2025 20:52)        Patient is a 39y old  Male who presents with a chief complaint of Debility secondary to acute hypoxic respiratory failure (02 May 2025 20:52)      HPI:  Sunny Quintanilla is a 39 year old male with PMH of morbid obesity, BEA on CPAP, pAFIB (not on AC), HTN, and BL papilledema attributed to pseudotumor cerebri; who initially presented to  on 2/24 with SOB and BL LE edema. Found to have URI with progressive SOB and multifocal PNA on imaging. His hospital course was complicated by worsening respiratory distress and increased 02 demands secondary to secretions (requiring multiple intubation attempts) and eventual MICU admission. While in MICU, he 02 requirements continued despite optimal vent management. Concern noted for progressive ARDS, unable to prone given morbid obesity, and ultimately cannulated for vvECMO on 2/25 and transferred to [facility] for further management on 2/26.    His hospital course at [facility] was significant for the following: diuretic and ABX management, s/p trache and PEG (placed on 3/7- PEG since removed), RCU admission, high grade fevers (secondary to panniculitis), progressively worsening sacral ulcer (likely osteomyelitis- s/p surgical debridement), BL foot wound (secondary to pressor use), neuropathy (secondary to critical illness polyneuropathy), ELLA (secondary to vancomycin toxicity and overdiuresis- since DCd- with improvement). PM&R was consulted and deemed him an appropriate candidate for IRF. He was medically stabilized and cleared for discharge to [facility].  (25 Apr 2025 15:05)    GI CONSULT- CT A/P revealing possible pneumatosis of cecum    Patient seen and examined at bedside. Offers no new complaints. Alert, awake , oriented. Denies abdominal pain, N/V/D/C. Tolerating diet well . Reports no GI related history, never had to see a GI specialist nor had any endoscopy or colonoscopy in the past          PAST MEDICAL & SURGICAL HISTORY:  HTN (hypertension)      Atrial fibrillation  paroxysmal      Papilledema of both eyes      Chronic sinusitis      Morbid obesity      No significant past surgical history          REVIEW OF SYSTEMS:   General: Negative  HEENT: Negative  CV: Negative  Respiratory: Negative  GI: See HPI  : Negative  MSK: Negative  Hematologic: Negative  Skin: Negative    MEDICATIONS:   MEDICATIONS  (STANDING):  amLODIPine   Tablet 10 milliGRAM(s) Oral daily  ammonium lactate 12% Lotion 1 Application(s) Topical two times a day  apixaban 5 milliGRAM(s) Oral two times a day  ascorbic acid 500 milliGRAM(s) Oral daily  bisacodyl 10 milliGRAM(s) Oral at bedtime  capsaicin 0.025% Cream 1 Application(s) Topical four times a day  clotrimazole 1% Cream 1 Application(s) Topical <User Schedule>  Dakins Solution - 1/4 Strength 1 Application(s) Topical two times a day  dextrose 5%. 1000 milliLiter(s) (50 mL/Hr) IV Continuous <Continuous>  dextrose 5%. 1000 milliLiter(s) (100 mL/Hr) IV Continuous <Continuous>  dextrose 50% Injectable 25 Gram(s) IV Push once  dextrose 50% Injectable 12.5 Gram(s) IV Push once  dextrose 50% Injectable 25 Gram(s) IV Push once  DULoxetine 60 milliGRAM(s) Oral daily  gabapentin 1200 milliGRAM(s) Oral every 8 hours  glucagon  Injectable 1 milliGRAM(s) IntraMuscular once  influenza   Vaccine 0.5 milliLiter(s) IntraMuscular once  lidocaine   4% Patch 1 Patch Transdermal daily  lidocaine   4% Patch 1 Patch Transdermal daily  melatonin 3 milliGRAM(s) Oral at bedtime  multivitamin 1 Tablet(s) Oral daily  mupirocin 2% Ointment 1 Application(s) Topical two times a day  naloxegol 25 milliGRAM(s) Oral daily  pantoprazole    Tablet 40 milliGRAM(s) Oral before breakfast  piperacillin/tazobactam IVPB.. 3.375 Gram(s) IV Intermittent every 8 hours  polyethylene glycol 3350 17 Gram(s) Oral two times a day  povidone iodine 10% Solution 1 Application(s) Topical daily  QUEtiapine 25 milliGRAM(s) Oral at bedtime  sevelamer carbonate 800 milliGRAM(s) Oral three times a day with meals  tamsulosin 0.4 milliGRAM(s) Oral at bedtime  zinc sulfate 220 milliGRAM(s) Oral daily    MEDICATIONS  (PRN):  acetaminophen     Tablet .. 650 milliGRAM(s) Oral every 6 hours PRN Temp greater or equal to 38C (100.4F), Mild Pain (1 - 3), Moderate Pain (4 - 6)  albuterol/ipratropium for Nebulization 3 milliLiter(s) Nebulizer every 6 hours PRN Shortness of Breath and/or Wheezing  cyclobenzaprine 5 milliGRAM(s) Oral three times a day PRN Muscle Spasm  dextrose Oral Gel 15 Gram(s) Oral once PRN Blood Glucose LESS THAN 70 milliGRAM(s)/deciliter  oxyCODONE    IR 15 milliGRAM(s) Oral every 6 hours PRN Severe Pain (7 - 10)  oxyCODONE    IR 10 milliGRAM(s) Oral two times a day PRN Moderate Pain (4 - 6)          DIET:  Diet, Regular:   Consistent Carbohydrate Evening Snack (CSTCHOSN)  Low Sodium  No Concentrated Phosphorus  Efrain(7 Gm Arginine/7 Gm Glut/1.2 Gm HMB     Qty per Day:  BID  Supplement Feeding Modality:  Oral  Ensure Max Cans or Servings Per Day:  1       Frequency:  Two Times a day (05-03-25 @ 00:02) [Active]          ALLERGIES:   Allergies    No Known Allergies    Intolerances        Substance Use:   ( x ) never used  (  ) other:  Tobacco Usage:   ( x  ) current smoker       Family History   IBD (  ) Yes   (  ) No  GI Malignancy (  )  Yes    (  ) No    Health Management  Last Colonoscopy:Never  Last Endoscopy: Never    VITAL SIGNS:   Vital Signs Last 24 Hrs  T(C): 36.3 (03 May 2025 09:04), Max: 36.9 (02 May 2025 21:40)  T(F): 97.4 (03 May 2025 09:04), Max: 98.5 (02 May 2025 21:40)  HR: 98 (03 May 2025 09:04) (98 - 115)  BP: 142/90 (03 May 2025 09:04) (114/71 - 142/90)  BP(mean): --  RR: 16 (03 May 2025 09:04) (16 - 16)  SpO2: 100% (03 May 2025 09:04) (98% - 100%)    Parameters below as of 03 May 2025 09:04  Patient On (Oxygen Delivery Method): room air      I&O's Summary    02 May 2025 07:01  -  03 May 2025 07:00  --------------------------------------------------------  IN: 0 mL / OUT: 1400 mL / NET: -1400 mL        PHYSICAL EXAM:   GENERAL:  No acute distress, morbidly obese  HEENT:  NC/AT, conjunctiva clear, sclera anicteric  CHEST:  No increased effort  HEART:  Regular rate  ABDOMEN:  obese, Soft, non-tender, non-distended, no rebound or guarding  EXTREMITIES: No edema  SKIN:  Warm, dry  NEURO: Alert, awake oriented X 3,  Calm, cooperative    LABS:    Hemoglobin: 8.3 g/dL (05-01-25 @ 06:58)      RADIOLOGY & ADDITIONAL STUDIES:      ACC: [id number] EXAM:  CT ABDOMEN AND PELVIS WAW IC   ORDERED BY: SANDY DILLON     PROCEDURE DATE:  05/01/2025          INTERPRETATION:  CLINICAL INFORMATION: Reevaluate sacral wound.    ADDITIONAL CLINICAL INFORMATION: Other, Non-specified    COMPARISON: 4/3/2025.    CONTRAST/COMPLICATIONS:  IV Contrast: Omnipaque 350  120 cc administered   30 cc discarded  Oral Contrast: NONE  .    PROCEDURE:  CT of the Abdomen and Pelvis was performed.  Precontrast, Arterial and Delayed phases were performed.  Sagittal and coronal reformats were performed.    FINDINGS:  LOWER CHEST: Bibasilar subsegmental atelectasis.    LIVER: Steatosis.  BILE DUCTS: Normal caliber.  GALLBLADDER: Within normal limits.  SPLEEN: Within normal limits.  PANCREAS: Within normal limits.  ADRENALS: Within normal limits.  KIDNEYS/URETERS: Within normal limits.    BLADDER: Within normal limits. Trace free fluid in the presacral space is   reactive.  REPRODUCTIVE ORGANS: Prostate within normal limits.    BOWEL: No bowel obstruction. Small hiatal hernia. Appendix is normal.   Question of pneumatosis in the cecum. The bowel is otherwise normal.  PERITONEUM/RETROPERITONEUM: Within normal limits.  VESSELS: Patent mesenteric vasculature.  LYMPH NODES: No lymphadenopathy.  ABDOMINAL WALL: Subcutaneous edema in bilateral gluteal region.   Enlargement of right gluteus zurdo muscle with an air and fluid   containing collection of 9.8 x 2.4 cm which is in continuity with a   subcutaneous sacral collection of 5.8 x 4.4 cm. Noassociated skin   defect. Significant subcutaneous fatty stranding.  BONES: Degenerative changes.    IMPRESSION:  An air and fluid containing collection in the right gluteus zurdo   muscle in continuity with a subcutaneous sacral collection. No obvious   skin defect to suggest a decubitus ulcer.  Question of pneumatosis in the cecum with no other evidence of bowel   ischemia. Correlation with lactate levels and clinical exam would be   helpful.    --- End of Report ---            ADAL ABARCA MD; Attending Radiologist  This document has been electronically signed. May  2 2025  1:48PM  05-01-25 @ 20:32

## 2025-05-03 NOTE — PROGRESS NOTE ADULT - SUBJECTIVE AND OBJECTIVE BOX
Medicine Progress Note    Patient is a 39y old  Male who presents with a chief complaint of Debility secondary to acute hypoxic respiratory failure (03 May 2025 09:17)      SUBJECTIVE / OVERNIGHT EVENTS:  no complaints      ROS:  denied fever/chills/CP/SOB/cough/palpitation/dizziness/abdominal pian/nausea/vomiting/diarrhoea/constipation/dysuria/leg or calf pain/headaches.all other ROS neg    MEDICATIONS  (STANDING):  amLODIPine   Tablet 10 milliGRAM(s) Oral daily  ammonium lactate 12% Lotion 1 Application(s) Topical two times a day  apixaban 5 milliGRAM(s) Oral two times a day  ascorbic acid 500 milliGRAM(s) Oral daily  bisacodyl 10 milliGRAM(s) Oral at bedtime  capsaicin 0.025% Cream 1 Application(s) Topical four times a day  clotrimazole 1% Cream 1 Application(s) Topical <User Schedule>  Dakins Solution - 1/4 Strength 1 Application(s) Topical two times a day  dextrose 5%. 1000 milliLiter(s) (50 mL/Hr) IV Continuous <Continuous>  dextrose 5%. 1000 milliLiter(s) (100 mL/Hr) IV Continuous <Continuous>  dextrose 50% Injectable 25 Gram(s) IV Push once  dextrose 50% Injectable 12.5 Gram(s) IV Push once  dextrose 50% Injectable 25 Gram(s) IV Push once  DULoxetine 60 milliGRAM(s) Oral daily  gabapentin 1200 milliGRAM(s) Oral every 8 hours  glucagon  Injectable 1 milliGRAM(s) IntraMuscular once  influenza   Vaccine 0.5 milliLiter(s) IntraMuscular once  lidocaine   4% Patch 1 Patch Transdermal daily  lidocaine   4% Patch 1 Patch Transdermal daily  melatonin 3 milliGRAM(s) Oral at bedtime  multivitamin 1 Tablet(s) Oral daily  mupirocin 2% Ointment 1 Application(s) Topical two times a day  naloxegol 25 milliGRAM(s) Oral daily  pantoprazole    Tablet 40 milliGRAM(s) Oral before breakfast  piperacillin/tazobactam IVPB.. 3.375 Gram(s) IV Intermittent every 8 hours  polyethylene glycol 3350 17 Gram(s) Oral two times a day  povidone iodine 10% Solution 1 Application(s) Topical daily  QUEtiapine 25 milliGRAM(s) Oral at bedtime  sevelamer carbonate 800 milliGRAM(s) Oral three times a day with meals  tamsulosin 0.4 milliGRAM(s) Oral at bedtime  zinc sulfate 220 milliGRAM(s) Oral daily    MEDICATIONS  (PRN):  acetaminophen     Tablet .. 650 milliGRAM(s) Oral every 6 hours PRN Temp greater or equal to 38C (100.4F), Mild Pain (1 - 3), Moderate Pain (4 - 6)  albuterol/ipratropium for Nebulization 3 milliLiter(s) Nebulizer every 6 hours PRN Shortness of Breath and/or Wheezing  cyclobenzaprine 5 milliGRAM(s) Oral three times a day PRN Muscle Spasm  dextrose Oral Gel 15 Gram(s) Oral once PRN Blood Glucose LESS THAN 70 milliGRAM(s)/deciliter  oxyCODONE    IR 15 milliGRAM(s) Oral every 6 hours PRN Severe Pain (7 - 10)  oxyCODONE    IR 10 milliGRAM(s) Oral two times a day PRN Moderate Pain (4 - 6)    CAPILLARY BLOOD GLUCOSE        I&O's Summary    02 May 2025 07:01  -  03 May 2025 07:00  --------------------------------------------------------  IN: 0 mL / OUT: 1400 mL / NET: -1400 mL    03 May 2025 07:01  -  03 May 2025 11:29  --------------------------------------------------------  IN: 0 mL / OUT: 800 mL / NET: -800 mL        PHYSICAL EXAM:  Vital Signs Last 24 Hrs  T(C): 36.3 (03 May 2025 09:04), Max: 36.9 (02 May 2025 21:40)  T(F): 97.4 (03 May 2025 09:04), Max: 98.5 (02 May 2025 21:40)  HR: 98 (03 May 2025 09:04) (98 - 115)  BP: 142/90 (03 May 2025 09:04) (114/71 - 142/90)  BP(mean): --  RR: 16 (03 May 2025 09:04) (16 - 16)  SpO2: 100% (03 May 2025 09:04) (98% - 100%)    Parameters below as of 03 May 2025 09:04  Patient On (Oxygen Delivery Method): room air        GENERAL: Not in distress. Alert . morbidly obese  HEENT: clear conjuctiva, MMM. no pallor or icterus  CARDIOVASCULAR: RRR S1, S2. No murmur/rubs/gallop  LUNGS: BLAE+, no rales, no wheezing, no rhonchi.    ABDOMEN: ND. Soft,  NT, no guarding / rebound / rigidity. BS normoactive  EXTREMITIES: no edema. no leg or calf TP. no hip TP  SKIN: warm and dry.   PSYCHIATRIC: Calm.  No agitation.  CNS: AAO*3. moves limbs, follows commands. b.l LE weakness    LABS:      COVID-19 PCR: NotDetec (25 Apr 2025 18:31)  SARS-CoV-2: NotDetec (16 Mar 2025 16:35)  SARS-CoV-2: NotDetec (12 Mar 2025 07:19)  SARS-CoV-2: NotDetec (26 Feb 2025 02:27)  SARS-CoV-2: NotDetec (24 Feb 2025 18:19)      RADIOLOGY & ADDITIONAL TESTS:  Imaging from Last 24 Hours:  < from: CT Abdomen and Pelvis w/wo IV Cont (05.01.25 @ 20:32) >  FINDINGS:  LOWER CHEST: Bibasilar subsegmental atelectasis.    LIVER: Steatosis.  BILE DUCTS: Normal caliber.  GALLBLADDER: Within normal limits.  SPLEEN: Within normal limits.  PANCREAS: Within normal limits.  ADRENALS: Within normal limits.  KIDNEYS/URETERS: Within normal limits.    BLADDER: Within normal limits. Trace free fluid in the presacral space is   reactive.  REPRODUCTIVE ORGANS: Prostate within normal limits.    BOWEL: No bowel obstruction. Small hiatal hernia. Appendix is normal.   Question of pneumatosis in the cecum. The bowel is otherwise normal.  PERITONEUM/RETROPERITONEUM: Within normal limits.  VESSELS: Patent mesenteric vasculature.  LYMPH NODES: No lymphadenopathy.  ABDOMINAL WALL: Subcutaneous edema in bilateral gluteal region.   Enlargement of right gluteus zurdo muscle with an air and fluid   containing collection of 9.8 x 2.4 cm which is in continuity with a   subcutaneous sacral collection of 5.8 x 4.4 cm. Noassociated skin   defect. Significant subcutaneous fatty stranding.  BONES: Degenerative changes.    IMPRESSION:  An air and fluid containing collection in the right gluteus zurdo   muscle in continuity with a subcutaneous sacral collection. No obvious   skin defect to suggest a decubitus ulcer.  Question of pneumatosis in the cecum with no other evidence of bowel   ischemia. Correlation with lactate levels and clinical exam would be   helpful.    < end of copied text >      Electrocardiogram/QTc Interval:    COORDINATION OF CARE:  Care Discussed with Consultants/Other Providers:

## 2025-05-03 NOTE — CONSULT NOTE ADULT - ASSESSMENT
39 year old male with PMH of morbid obesity, BEA on CPAP, pAFIB (not on AC), HTN, and BL papilledema attributed to pseudotumor cerebri; who initially presented to  on 2/24 with SOB and BL LE edema. Found to have URI with progressive SOB and multifocal PNA on imaging. His hospital course was complicated by worsening respiratory distress and increased 02 demands secondary to secretions (requiring multiple intubation attempts) and eventual MICU admission. While in MICU, he 02 requirements continued despite optimal vent management. Concern noted for progressive ARDS, unable to prone given morbid obesity, and ultimately cannulated for vvECMO on 2/25 and transferred to [facility] for further management on 2/26.  His hospital course at [facility] was significant for the following: diuretic and ABX management, s/p trache and PEG (placed on 3/7- PEG since removed), RCU admission, high grade fevers (secondary to panniculitis), progressively worsening sacral ulcer (likely osteomyelitis- s/p surgical debridement), BL foot wound (secondary to pressor use), neuropathy (secondary to critical illness polyneuropathy), ELLA (secondary to vancomycin toxicity and overdiuresis- since DCd- with improvement). Patient now admitted for a multidisciplinary rehab program.     GI consulted as recent CT A/P revealing possible pneumatosis of cecum

## 2025-05-03 NOTE — PROGRESS NOTE ADULT - ASSESSMENT
39 year old male with PMH of morbid obesity, BEA, pAFIB (not on AC), HTN, and BL papilledema attributed to pseudotumor cerebri; who initially presented to  on 2/24 with SOB and BL LE edema. Found to have URI with progressive SOB and multifocal PNA on imaging. His hospital course was complicated by worsening respiratory distress and increased 02 demands secondary to secretions (requiring multiple intubation attempts) and eventual MICU admission. While in MICU, he 02 requirements continued despite optimal vent management. Concern noted for progressive ARDS, unable to prone given morbid obesity, and ultimately cannulated for vvECMO on 2/25 and transferred to [facility] for further management on 2/26. His hospital course at [facility] was significant for the following: diuretic and ABX management, s/p trach and PEG (placed on 3/7- PEG since removed), RCU admission, high grade fevers (secondary to panniculitis), progressively worsening sacral ulcer (likely osteomyelitis- s/p surgical debridement), BL foot wound (secondary to pressor use), neuropathy (secondary to critical illness polyneuropathy), ELLA (secondary to vancomycin toxicity and overdiuresis- since DCd- with improvement). Patient now admitted for a multidisciplinary rehab program. 04-25-25 @ 15:19    Abnormal CT A/P:   - 5/2: CT A/P: Question of pneumatosis in the cecum with no other evidence of bowel ischemia. Correlation with lactate levels and clinical exam would be helpful.  - Patient has no symptoms. Abdomen is benign. Tolerating PO  - 5/2: Surgery cx noted: no intervention. c/w PO. refer to bariatric surgery post-DC dr botello at [place] or Dr. Segovia at [place] [per patient request]  - 5/3 GI cx noted: no GI intervention planned. c/w PO    left hip pain  - better today  - f/u hip xray  - pain control, PT per primary    #Debility Secondary to Acute Hypoxic Respiratory Failure/ARDS 2/2 PNA  #BEA (Not on CPAP)  #Critical Illness Polyneuropathy   -s/p VV ECMO, s/p diuresis, TTE/ROBERT with RV failure, s/p treatment for pneumonia  -Repeat Echo 3/11 showed EF 66 %. RV is not well visualized, enlarged RV cavity size a/ reduced RV systolic function. No significant valvular disease.  No echocardiographic evidence of pulmonary hypertension.  -s/p Trach (decannulated 3/28) and PEG (removed 4/15)   -Saturating well on RA  -Continue duoneb PRN   -BIPAP QHS (FiO2 40%, 18/10) via nasal mask   -Fall precaution  -Pain control and bowel regimen per rehab team  -Comprehensive rehab program with PT/OT/SLP  -Outpatient pulm follow up     #Possible Sacral Osteomyelitis  -Continue Zosyn TID - last dose evening of 5/11/25    #Fungemia 2/2 Panniculitis  -Blood culture 3/15 and 3/16 with candida albicans  -s/p course of antifungals   -Repeat cultures negative since 3/18    #Chronic AFIB  #Sinus Tachycardia  - Patient has intermittent tachycardia. Per patient, His HR amostly above 110 even when he is not sick. follows with cardio OP  - 5/3: EKG: NSR@94 bpm. [ prelim]. f/u final report  - EKG 4/28 showed sinus tachycardia, no acute acute ST/T wave changes  - c/w Eliquis 5mg BID   - Not on AVN blocker   - TSH WNL 2/25  - Sinus tachycardia likely multifactorial including pain and deconditioning. Low suspicion for PE, no SOB/hypoxia and he is already on full AC    #RIJ and Bilateral LE DVT  -Duplex RUE 3/14 showed Acute, non-occlusive deep vein thrombosis noted within the right internal jugular vein. Superficial vein thrombosis noted within the right antecubital cephalic vein.  -Duplex LE 3/14 showed Acute, occlusive deep vein thromboses noted within the right and left peroneal veins at both mid calves. Age-indeterminate, occlusive deep vein thrombosis visualized within the left gastrocnemius vein at the proximal calf.  -Continue eliquis  -Repeat duplex 4/29 showed No evidence of deep venous thrombosis in either lower extremity.    #Normocytic Anemia   -Likely multifactorial  -H/H stable, no evidence of active bleed   -Monitor CBC     #HTN  -Continue Amlodipine  -Monitor BP     #Hypokalemia (Resolved)  -s/p repletion  -Monitor BMP    #Sleep/Mood  -Continue Melatonin 3mg at HS   -Continue Quetiapine 25mg at HS     #History of Pseudotumor Cerebri   -Outpatient follow up     #Stage IV Sacral Decub Ulcer  #Bilateral Buttocks Wounds  #Bilateral Foot Wounds  -5/2 CT A/p: An air and fluid containing collection in the right gluteus zurdo muscle in continuity with a subcutaneous sacral collection. No obvious skin defect to suggest a decubitus ulcer.  -Continue local wound care  - Wound vac per plastic  -MVI, vitamin C and zinc   - Off load pressure  - Plastic consult following    #ELLA (Improved)  -Baseline Cr appears to be ~0.8 range   -ELLA felt to be multifactorial in setting of cardiorenal w/ RVE, recurrent ELLA suspected due to vancomycin toxicity and diuresis   -Cr mild uptrend to 1.45 (4/28) -> 1.16 on 4/29 -> 1.03 on 5/1   -Continue to encourage oral hydration   -Continue Renvela 800mg TID, monitor phos level  -Low phos diet  -Monitor BMP    #Type 2 Diabetes  -HbA1C 6.7 on 2/25, Repeat HbA1C 4.9 on 4/28  -FS trend reviewed, has been within goal  -RAISS discontinued  -Monitor glucose on routine lab, controlled     #/Urinary Retention  -on flomax 5/1  -Monitor bladder scans     #GI PPx  -PPI    #DVT PPx  -On Eliquis    Discussed with  and  rehab team at [facility]

## 2025-05-03 NOTE — CONSULT NOTE ADULT - PROBLEM SELECTOR RECOMMENDATION 9
CT scan of A/P on 5/1/2025 w Question of pneumatosis in the cecum with no other evidence of bowel   ischemia.   No leukocytosis, patient afebrile, no abdominal pain, no active signs of infection  Continue regular diet  No GI intervention recommended at this time CT scan of A/P on 5/1/2025 w Question of pneumatosis in the cecum with no other evidence of bowel   ischemia.   No leukocytosis, patient afebrile, no abdominal pain, no active signs of infection  Check lactate level  Continue regular diet  No GI intervention recommended at this time

## 2025-05-04 PROCEDURE — 99232 SBSQ HOSP IP/OBS MODERATE 35: CPT

## 2025-05-04 RX ADMIN — POLYETHYLENE GLYCOL 3350 17 GRAM(S): 17 POWDER, FOR SOLUTION ORAL at 05:30

## 2025-05-04 RX ADMIN — OXYCODONE HYDROCHLORIDE 15 MILLIGRAM(S): 30 TABLET ORAL at 08:37

## 2025-05-04 RX ADMIN — Medication 220 MILLIGRAM(S): at 13:00

## 2025-05-04 RX ADMIN — SEVELAMER HYDROCHLORIDE 800 MILLIGRAM(S): 800 TABLET ORAL at 08:37

## 2025-05-04 RX ADMIN — Medication 25 GRAM(S): at 14:33

## 2025-05-04 RX ADMIN — OXYCODONE HYDROCHLORIDE 10 MILLIGRAM(S): 30 TABLET ORAL at 13:59

## 2025-05-04 RX ADMIN — SODIUM HYPOCHLORITE 1 APPLICATION(S): 0.12 SOLUTION TOPICAL at 16:26

## 2025-05-04 RX ADMIN — Medication 25 GRAM(S): at 21:42

## 2025-05-04 RX ADMIN — MUPIROCIN CALCIUM 1 APPLICATION(S): 20 CREAM TOPICAL at 17:33

## 2025-05-04 RX ADMIN — Medication 1 TABLET(S): at 13:00

## 2025-05-04 RX ADMIN — Medication 3 MILLIGRAM(S): at 21:42

## 2025-05-04 RX ADMIN — Medication 1 APPLICATION(S): at 17:28

## 2025-05-04 RX ADMIN — LIDOCAINE HYDROCHLORIDE 1 PATCH: 20 JELLY TOPICAL at 00:00

## 2025-05-04 RX ADMIN — NALOXEGOL OXALATE 25 MILLIGRAM(S): 12.5 TABLET, FILM COATED ORAL at 13:00

## 2025-05-04 RX ADMIN — APIXABAN 5 MILLIGRAM(S): 2.5 TABLET, FILM COATED ORAL at 05:30

## 2025-05-04 RX ADMIN — QUETIAPINE FUMARATE 25 MILLIGRAM(S): 25 TABLET ORAL at 21:42

## 2025-05-04 RX ADMIN — AMLODIPINE BESYLATE 10 MILLIGRAM(S): 10 TABLET ORAL at 05:29

## 2025-05-04 RX ADMIN — SEVELAMER HYDROCHLORIDE 800 MILLIGRAM(S): 800 TABLET ORAL at 13:00

## 2025-05-04 RX ADMIN — DULOXETINE 60 MILLIGRAM(S): 20 CAPSULE, DELAYED RELEASE ORAL at 13:00

## 2025-05-04 RX ADMIN — Medication 25 GRAM(S): at 05:29

## 2025-05-04 RX ADMIN — CYCLOBENZAPRINE HYDROCHLORIDE 5 MILLIGRAM(S): 15 CAPSULE, EXTENDED RELEASE ORAL at 08:37

## 2025-05-04 RX ADMIN — Medication 40 MILLIGRAM(S): at 05:29

## 2025-05-04 RX ADMIN — GABAPENTIN 1200 MILLIGRAM(S): 400 CAPSULE ORAL at 22:18

## 2025-05-04 RX ADMIN — TAMSULOSIN HYDROCHLORIDE 0.4 MILLIGRAM(S): 0.4 CAPSULE ORAL at 21:42

## 2025-05-04 RX ADMIN — GABAPENTIN 1200 MILLIGRAM(S): 400 CAPSULE ORAL at 14:33

## 2025-05-04 RX ADMIN — Medication 1 APPLICATION(S): at 05:30

## 2025-05-04 RX ADMIN — SEVELAMER HYDROCHLORIDE 800 MILLIGRAM(S): 800 TABLET ORAL at 17:32

## 2025-05-04 RX ADMIN — Medication 1 APPLICATION(S): at 11:47

## 2025-05-04 RX ADMIN — Medication 500 MILLIGRAM(S): at 13:00

## 2025-05-04 RX ADMIN — SODIUM HYPOCHLORITE 1 APPLICATION(S): 0.12 SOLUTION TOPICAL at 05:30

## 2025-05-04 RX ADMIN — OXYCODONE HYDROCHLORIDE 15 MILLIGRAM(S): 30 TABLET ORAL at 22:12

## 2025-05-04 RX ADMIN — OXYCODONE HYDROCHLORIDE 15 MILLIGRAM(S): 30 TABLET ORAL at 09:37

## 2025-05-04 RX ADMIN — Medication 1 APPLICATION(S): at 17:27

## 2025-05-04 RX ADMIN — MUPIROCIN CALCIUM 1 APPLICATION(S): 20 CREAM TOPICAL at 05:30

## 2025-05-04 RX ADMIN — OXYCODONE HYDROCHLORIDE 15 MILLIGRAM(S): 30 TABLET ORAL at 21:42

## 2025-05-04 RX ADMIN — CYCLOBENZAPRINE HYDROCHLORIDE 5 MILLIGRAM(S): 15 CAPSULE, EXTENDED RELEASE ORAL at 23:43

## 2025-05-04 RX ADMIN — OXYCODONE HYDROCHLORIDE 10 MILLIGRAM(S): 30 TABLET ORAL at 12:59

## 2025-05-04 RX ADMIN — CLOTRIMAZOLE 1 APPLICATION(S): 1 CREAM TOPICAL at 11:48

## 2025-05-04 RX ADMIN — GABAPENTIN 1200 MILLIGRAM(S): 400 CAPSULE ORAL at 05:29

## 2025-05-04 RX ADMIN — APIXABAN 5 MILLIGRAM(S): 2.5 TABLET, FILM COATED ORAL at 17:33

## 2025-05-04 NOTE — PROGRESS NOTE ADULT - ASSESSMENT
39 year old male with PMH of morbid obesity, BEA, pAFIB (not on AC), HTN, and BL papilledema attributed to pseudotumor cerebri; who initially presented to  on 2/24 with SOB and BL LE edema. Found to have URI with progressive SOB and multifocal PNA on imaging. His hospital course was complicated by worsening respiratory distress and increased 02 demands secondary to secretions (requiring multiple intubation attempts) and eventual MICU admission. While in MICU, he 02 requirements continued despite optimal vent management. Concern noted for progressive ARDS, unable to prone given morbid obesity, and ultimately cannulated for vvECMO on 2/25 and transferred to [facility] for further management on 2/26. His hospital course at [facility] was significant for the following: diuretic and ABX management, s/p trach and PEG (placed on 3/7- PEG since removed), RCU admission, high grade fevers (secondary to panniculitis), progressively worsening sacral ulcer (likely osteomyelitis- s/p surgical debridement), BL foot wound (secondary to pressor use), neuropathy (secondary to critical illness polyneuropathy), ELLA (secondary to vancomycin toxicity and overdiuresis- since DCd- with improvement). Patient now admitted for a multidisciplinary rehab program. 04-25-25 @ 15:19    Abnormal CT A/P:   - 5/2: CT A/P: Question of pneumatosis in the cecum with no other evidence of bowel ischemia. Correlation with lactate levels and clinical exam would be helpful.  - Patient has no symptoms. Abdomen is benign. Tolerating PO  - 5/2: Surgery cx noted: no intervention. c/w PO. refer to bariatric surgery post-DC dr botello at [place] or Dr. Segovia at [place] [per patient request]  - 5/3 GI cx noted: no GI intervention planned. c/w PO    left hip pain  - better today  - f/u hip xray--> no fracture  - pain control, PT per primary    #Debility Secondary to Acute Hypoxic Respiratory Failure/ARDS 2/2 PNA  #BEA (Not on CPAP)  #Critical Illness Polyneuropathy   -s/p VV ECMO, s/p diuresis, TTE/ROBERT with RV failure, s/p treatment for pneumonia  -Repeat Echo 3/11 showed EF 66 %. RV is not well visualized, enlarged RV cavity size a/ reduced RV systolic function. No significant valvular disease.  No echocardiographic evidence of pulmonary hypertension.  -s/p Trach (decannulated 3/28) and PEG (removed 4/15)   -Saturating well on RA  -Continue duoneb PRN   -BIPAP QHS (FiO2 40%, 18/10) via nasal mask   -Fall precaution  -Pain control and bowel regimen per rehab team  -Comprehensive rehab program with PT/OT/SLP  -Outpatient pulm follow up     #Possible Sacral Osteomyelitis  -Continue Zosyn TID - last dose evening of 5/11/25    #Fungemia 2/2 Panniculitis  -Blood culture 3/15 and 3/16 with candida albicans  -s/p course of antifungals   -Repeat cultures negative since 3/18    #Chronic AFIB  #Sinus Tachycardia  - Patient has intermittent tachycardia. Per patient, His HR amostly above 110 even when he is not sick. follows with cardio OP  - 5/3: EKG: NSR@94 bpm  - EKG 4/28 showed sinus tachycardia, no acute acute ST/T wave changes  - c/w Eliquis 5mg BID   - Not on AVN blocker   - TSH WNL 2/25  - Sinus tachycardia likely multifactorial including pain and deconditioning. Low suspicion for PE, no SOB/hypoxia and he is already on full AC    #RIJ and Bilateral LE DVT  -Duplex RUE 3/14 showed Acute, non-occlusive deep vein thrombosis noted within the right internal jugular vein. Superficial vein thrombosis noted within the right antecubital cephalic vein.  -Duplex LE 3/14 showed Acute, occlusive deep vein thromboses noted within the right and left peroneal veins at both mid calves. Age-indeterminate, occlusive deep vein thrombosis visualized within the left gastrocnemius vein at the proximal calf.  -Continue eliquis  -Repeat duplex 4/29 showed No evidence of deep venous thrombosis in either lower extremity.    #Normocytic Anemia   -Likely multifactorial  -H/H stable, no evidence of active bleed   -Monitor CBC     #HTN  -Continue Amlodipine  -Monitor BP     #Hypokalemia (Resolved)  -s/p repletion  -Monitor BMP    #Sleep/Mood  -Continue Melatonin 3mg at HS   -Continue Quetiapine 25mg at HS     #History of Pseudotumor Cerebri   -Outpatient follow up     #Stage IV Sacral Decub Ulcer  #Bilateral Buttocks Wounds  #Bilateral Foot Wounds  -5/2 CT A/p: An air and fluid containing collection in the right gluteus zurdo muscle in continuity with a subcutaneous sacral collection. No obvious skin defect to suggest a decubitus ulcer.  -Continue local wound care  - Wound vac per plastic  -MVI, vitamin C and zinc   - Off load pressure  - Plastic consult following    #ELLA (Improved)  -Baseline Cr appears to be ~0.8 range   -ELLA felt to be multifactorial in setting of cardiorenal w/ RVE, recurrent ELLA suspected due to vancomycin toxicity and diuresis   -Cr mild uptrend to 1.45 (4/28) -> 1.16 on 4/29 -> 1.03 on 5/1   -Continue to encourage oral hydration   -Continue Renvela 800mg TID, monitor phos level  -Low phos diet  -Monitor BMP    #Type 2 Diabetes  -HbA1C 6.7 on 2/25, Repeat HbA1C 4.9 on 4/28  -FS trend reviewed, has been within goal  -RAISS discontinued  -Monitor glucose on routine lab, controlled     #/Urinary Retention  -on flomax 5/1  -Monitor bladder scans     #GI PPx  -PPI    #DVT PPx  -On Eliquis    Discussed with  and  rehab team at [facility]

## 2025-05-04 NOTE — PROGRESS NOTE ADULT - SUBJECTIVE AND OBJECTIVE BOX
Medicine Progress Note    Patient is a 39y old  Male who presents with a chief complaint of Debility secondary to acute hypoxic respiratory failure (03 May 2025 15:51)      SUBJECTIVE / OVERNIGHT EVENTS:  no complaints      ROS:  denied fever/chills/CP/SOB/cough/palpitation/dizziness/abdominal pian/nausea/vomiting/diarrhoea/constipation/dysuria/leg or calf pain/headaches.all other ROS neg:    MEDICATIONS  (STANDING):  amLODIPine   Tablet 10 milliGRAM(s) Oral daily  ammonium lactate 12% Lotion 1 Application(s) Topical two times a day  apixaban 5 milliGRAM(s) Oral two times a day  ascorbic acid 500 milliGRAM(s) Oral daily  bisacodyl 10 milliGRAM(s) Oral at bedtime  capsaicin 0.025% Cream 1 Application(s) Topical four times a day  clotrimazole 1% Cream 1 Application(s) Topical <User Schedule>  Dakins Solution - 1/4 Strength 1 Application(s) Topical two times a day  dextrose 5%. 1000 milliLiter(s) (50 mL/Hr) IV Continuous <Continuous>  dextrose 5%. 1000 milliLiter(s) (100 mL/Hr) IV Continuous <Continuous>  dextrose 50% Injectable 25 Gram(s) IV Push once  dextrose 50% Injectable 12.5 Gram(s) IV Push once  dextrose 50% Injectable 25 Gram(s) IV Push once  DULoxetine 60 milliGRAM(s) Oral daily  gabapentin 1200 milliGRAM(s) Oral every 8 hours  glucagon  Injectable 1 milliGRAM(s) IntraMuscular once  influenza   Vaccine 0.5 milliLiter(s) IntraMuscular once  lidocaine   4% Patch 1 Patch Transdermal daily  lidocaine   4% Patch 1 Patch Transdermal daily  melatonin 3 milliGRAM(s) Oral at bedtime  multivitamin 1 Tablet(s) Oral daily  mupirocin 2% Ointment 1 Application(s) Topical two times a day  naloxegol 25 milliGRAM(s) Oral daily  pantoprazole    Tablet 40 milliGRAM(s) Oral before breakfast  piperacillin/tazobactam IVPB.. 3.375 Gram(s) IV Intermittent every 8 hours  polyethylene glycol 3350 17 Gram(s) Oral two times a day  povidone iodine 10% Solution 1 Application(s) Topical daily  QUEtiapine 25 milliGRAM(s) Oral at bedtime  sevelamer carbonate 800 milliGRAM(s) Oral three times a day with meals  tamsulosin 0.4 milliGRAM(s) Oral at bedtime  zinc sulfate 220 milliGRAM(s) Oral daily    MEDICATIONS  (PRN):  acetaminophen     Tablet .. 650 milliGRAM(s) Oral every 6 hours PRN Temp greater or equal to 38C (100.4F), Mild Pain (1 - 3), Moderate Pain (4 - 6)  albuterol/ipratropium for Nebulization 3 milliLiter(s) Nebulizer every 6 hours PRN Shortness of Breath and/or Wheezing  cyclobenzaprine 5 milliGRAM(s) Oral three times a day PRN Muscle Spasm  dextrose Oral Gel 15 Gram(s) Oral once PRN Blood Glucose LESS THAN 70 milliGRAM(s)/deciliter  oxyCODONE    IR 15 milliGRAM(s) Oral every 6 hours PRN Severe Pain (7 - 10)  oxyCODONE    IR 10 milliGRAM(s) Oral two times a day PRN Moderate Pain (4 - 6)    CAPILLARY BLOOD GLUCOSE        I&O's Summary    03 May 2025 07:01  -  04 May 2025 07:00  --------------------------------------------------------  IN: 0 mL / OUT: 800 mL / NET: -800 mL    04 May 2025 07:01  -  04 May 2025 12:39  --------------------------------------------------------  IN: 0 mL / OUT: 700 mL / NET: -700 mL        PHYSICAL EXAM:  Vital Signs Last 24 Hrs  T(C): 36.4 (04 May 2025 08:30), Max: 36.5 (03 May 2025 21:31)  T(F): 97.6 (04 May 2025 08:30), Max: 97.7 (03 May 2025 21:31)  HR: 104 (04 May 2025 08:30) (92 - 111)  BP: 124/81 (04 May 2025 08:30) (102/53 - 124/81)  BP(mean): --  RR: 16 (04 May 2025 08:30) (16 - 16)  SpO2: 97% (04 May 2025 08:30) (97% - 100%)    Parameters below as of 04 May 2025 08:30  Patient On (Oxygen Delivery Method): room air        GENERAL: Not in distress. Alert . morbidly obese  HEENT: clear conjuctiva, MMM. no pallor or icterus  CARDIOVASCULAR: RRR S1, S2. No murmur/rubs/gallop  LUNGS: BLAE+, no rales, no wheezing, no rhonchi.    ABDOMEN: ND. Soft,  NT, no guarding / rebound / rigidity. BS normoactive  EXTREMITIES: b/l LE edema. no leg or calf TP. no hip TP  SKIN: warm and dry.   PSYCHIATRIC: Calm.  No agitation.  CNS: AAO*3. moves limbs, follows commands. b.l LE weakness    LABS:                    COVID-19 PCR: NotDetec (25 Apr 2025 18:31)  SARS-CoV-2: NotDetec (16 Mar 2025 16:35)  SARS-CoV-2: NotDetec (12 Mar 2025 07:19)  SARS-CoV-2: NotDetec (26 Feb 2025 02:27)  SARS-CoV-2: NotDetec (24 Feb 2025 18:19)      RADIOLOGY & ADDITIONAL TESTS:  Imaging from Last 24 Hours:    Electrocardiogram/QTc Interval:    COORDINATION OF CARE:  Care Discussed with Consultants/Other Providers:

## 2025-05-04 NOTE — PROGRESS NOTE ADULT - SUBJECTIVE AND OBJECTIVE BOX
Cc: Gait dysfunction    HPI: Patient slept well  Tolerating oral diet   No abd pain   Drainage noted at sacral wound vac area     Pain controlled, no chest pain, no N/V, no Fevers/Chills.    Has been tolerating rehabilitation program.    Vital Signs Last 24 Hrs  T(C): 36.4 (04 May 2025 08:30), Max: 36.5 (03 May 2025 21:31)  T(F): 97.6 (04 May 2025 08:30), Max: 97.7 (03 May 2025 21:31)  HR: 104 (04 May 2025 08:30) (92 - 111)  BP: 124/81 (04 May 2025 08:30) (102/53 - 124/81)  RR: 16 (04 May 2025 08:30) (16 - 16)  SpO2: 97% (04 May 2025 08:30) (97% - 100%)  O2 Parameters below as of 04 May 2025 08:30  Patient On (Oxygen Delivery Method): room air    MEDICATIONS  (STANDING):  amLODIPine   Tablet 10 milliGRAM(s) Oral daily  ammonium lactate 12% Lotion 1 Application(s) Topical two times a day  apixaban 5 milliGRAM(s) Oral two times a day  ascorbic acid 500 milliGRAM(s) Oral daily  bisacodyl 10 milliGRAM(s) Oral at bedtime  capsaicin 0.025% Cream 1 Application(s) Topical four times a day  clotrimazole 1% Cream 1 Application(s) Topical <User Schedule>  Dakins Solution - 1/4 Strength 1 Application(s) Topical two times a day  dextrose 5%. 1000 milliLiter(s) (50 mL/Hr) IV Continuous <Continuous>  dextrose 5%. 1000 milliLiter(s) (100 mL/Hr) IV Continuous <Continuous>  dextrose 50% Injectable 25 Gram(s) IV Push once  dextrose 50% Injectable 12.5 Gram(s) IV Push once  dextrose 50% Injectable 25 Gram(s) IV Push once  DULoxetine 60 milliGRAM(s) Oral daily  gabapentin 1200 milliGRAM(s) Oral every 8 hours  glucagon  Injectable 1 milliGRAM(s) IntraMuscular once  influenza   Vaccine 0.5 milliLiter(s) IntraMuscular once  lidocaine   4% Patch 1 Patch Transdermal daily  lidocaine   4% Patch 1 Patch Transdermal daily  melatonin 3 milliGRAM(s) Oral at bedtime  multivitamin 1 Tablet(s) Oral daily  mupirocin 2% Ointment 1 Application(s) Topical two times a day  naloxegol 25 milliGRAM(s) Oral daily  pantoprazole    Tablet 40 milliGRAM(s) Oral before breakfast  piperacillin/tazobactam IVPB.. 3.375 Gram(s) IV Intermittent every 8 hours  polyethylene glycol 3350 17 Gram(s) Oral two times a day  povidone iodine 10% Solution 1 Application(s) Topical daily  QUEtiapine 25 milliGRAM(s) Oral at bedtime  sevelamer carbonate 800 milliGRAM(s) Oral three times a day with meals  tamsulosin 0.4 milliGRAM(s) Oral at bedtime  zinc sulfate 220 milliGRAM(s) Oral daily    MEDICATIONS  (PRN):  acetaminophen     Tablet .. 650 milliGRAM(s) Oral every 6 hours PRN Temp greater or equal to 38C (100.4F), Mild Pain (1 - 3), Moderate Pain (4 - 6)  albuterol/ipratropium for Nebulization 3 milliLiter(s) Nebulizer every 6 hours PRN Shortness of Breath and/or Wheezing  cyclobenzaprine 5 milliGRAM(s) Oral three times a day PRN Muscle Spasm  dextrose Oral Gel 15 Gram(s) Oral once PRN Blood Glucose LESS THAN 70 milliGRAM(s)/deciliter  oxyCODONE    IR 15 milliGRAM(s) Oral every 6 hours PRN Severe Pain (7 - 10)  oxyCODONE    IR 10 milliGRAM(s) Oral two times a day PRN Moderate Pain (4 - 6)      EXAM  Comfortable   HEENT- EOMI  Heart- RRR, S1S2  Lungs- Clear  Abd- + BS, non tender   Ext- No calf tenderness    Neuro- Exam; AAO X 3  Sacral ulcer- vac removed and wound examined  deep tunneling on superior aspect of the sacral ulcer, slough with bleeding on right buttock  Applied pressure dressing after cleaning with normal saline      < from: CT Abdomen and Pelvis w/wo IV Cont (05.01.25 @ 20:32) >  An air and fluid containing collection in the right gluteus zurdo   muscle in continuity with a subcutaneous sacral collection. No obvious   skin defect to suggest a decubitus ulcer.  Question of pneumatosis in the cecum with no other evidence of bowel   ischemia. Correlation with lactate levels and clinical exam would be   helpful.      Imp: Patient with diagnosis of Critical Illness Myopathy admitted for comprehensive acute rehabilitation.  Await f/u of sacral ulcer by plastics team    Plan:  - Continue therapies  - DVT prophylaxis--c/w apixiban  -HTN c/w amlodipine  - Depression c/w duloxetine  - BPH c/w flomax  - Sacral and Rt buttock ulcer with erythema and bleeding c/w zosyn, VAC removed, wet to dry applied, await plastics f/u review  Will recommence vac dressing MWF   - Continue current medications   - Patient is stable to continue current rehabilitation program.

## 2025-05-05 LAB
ALBUMIN SERPL ELPH-MCNC: 2.3 G/DL — LOW (ref 3.3–5)
ALP SERPL-CCNC: 57 U/L — SIGNIFICANT CHANGE UP (ref 40–120)
ALT FLD-CCNC: 19 U/L — SIGNIFICANT CHANGE UP (ref 10–45)
ANION GAP SERPL CALC-SCNC: 4 MMOL/L — LOW (ref 5–17)
AST SERPL-CCNC: 14 U/L — SIGNIFICANT CHANGE UP (ref 10–40)
BASOPHILS # BLD AUTO: 0.03 K/UL — SIGNIFICANT CHANGE UP (ref 0–0.2)
BASOPHILS NFR BLD AUTO: 0.5 % — SIGNIFICANT CHANGE UP (ref 0–2)
BILIRUB SERPL-MCNC: 0.3 MG/DL — SIGNIFICANT CHANGE UP (ref 0.2–1.2)
BUN SERPL-MCNC: 12 MG/DL — SIGNIFICANT CHANGE UP (ref 7–23)
CALCIUM SERPL-MCNC: 9.4 MG/DL — SIGNIFICANT CHANGE UP (ref 8.4–10.5)
CHLORIDE SERPL-SCNC: 101 MMOL/L — SIGNIFICANT CHANGE UP (ref 96–108)
CO2 SERPL-SCNC: 33 MMOL/L — HIGH (ref 22–31)
CREAT SERPL-MCNC: 1.12 MG/DL — SIGNIFICANT CHANGE UP (ref 0.5–1.3)
EGFR: 87 ML/MIN/1.73M2 — SIGNIFICANT CHANGE UP
EGFR: 87 ML/MIN/1.73M2 — SIGNIFICANT CHANGE UP
EOSINOPHIL # BLD AUTO: 0.33 K/UL — SIGNIFICANT CHANGE UP (ref 0–0.5)
EOSINOPHIL NFR BLD AUTO: 5.4 % — SIGNIFICANT CHANGE UP (ref 0–6)
GLUCOSE SERPL-MCNC: 109 MG/DL — HIGH (ref 70–99)
HCT VFR BLD CALC: 30 % — LOW (ref 39–50)
HGB BLD-MCNC: 8.8 G/DL — LOW (ref 13–17)
IMM GRANULOCYTES NFR BLD AUTO: 0.2 % — SIGNIFICANT CHANGE UP (ref 0–0.9)
LYMPHOCYTES # BLD AUTO: 1.77 K/UL — SIGNIFICANT CHANGE UP (ref 1–3.3)
LYMPHOCYTES # BLD AUTO: 29.1 % — SIGNIFICANT CHANGE UP (ref 13–44)
MAGNESIUM SERPL-MCNC: 1.7 MG/DL — SIGNIFICANT CHANGE UP (ref 1.6–2.6)
MCHC RBC-ENTMCNC: 25.1 PG — LOW (ref 27–34)
MCHC RBC-ENTMCNC: 29.3 G/DL — LOW (ref 32–36)
MCV RBC AUTO: 85.5 FL — SIGNIFICANT CHANGE UP (ref 80–100)
MONOCYTES # BLD AUTO: 0.81 K/UL — SIGNIFICANT CHANGE UP (ref 0–0.9)
MONOCYTES NFR BLD AUTO: 13.3 % — SIGNIFICANT CHANGE UP (ref 2–14)
NEUTROPHILS # BLD AUTO: 3.13 K/UL — SIGNIFICANT CHANGE UP (ref 1.8–7.4)
NEUTROPHILS NFR BLD AUTO: 51.5 % — SIGNIFICANT CHANGE UP (ref 43–77)
NRBC BLD AUTO-RTO: 0 /100 WBCS — SIGNIFICANT CHANGE UP (ref 0–0)
PLATELET # BLD AUTO: 380 K/UL — SIGNIFICANT CHANGE UP (ref 150–400)
POTASSIUM SERPL-MCNC: 3.4 MMOL/L — LOW (ref 3.5–5.3)
POTASSIUM SERPL-SCNC: 3.4 MMOL/L — LOW (ref 3.5–5.3)
PROT SERPL-MCNC: 7.7 G/DL — SIGNIFICANT CHANGE UP (ref 6–8.3)
RBC # BLD: 3.51 M/UL — LOW (ref 4.2–5.8)
RBC # FLD: 19.4 % — HIGH (ref 10.3–14.5)
SODIUM SERPL-SCNC: 138 MMOL/L — SIGNIFICANT CHANGE UP (ref 135–145)
WBC # BLD: 6.08 K/UL — SIGNIFICANT CHANGE UP (ref 3.8–10.5)
WBC # FLD AUTO: 6.08 K/UL — SIGNIFICANT CHANGE UP (ref 3.8–10.5)

## 2025-05-05 PROCEDURE — 99233 SBSQ HOSP IP/OBS HIGH 50: CPT

## 2025-05-05 PROCEDURE — 99232 SBSQ HOSP IP/OBS MODERATE 35: CPT

## 2025-05-05 RX ORDER — CYCLOBENZAPRINE HYDROCHLORIDE 15 MG/1
5 CAPSULE, EXTENDED RELEASE ORAL ONCE
Refills: 0 | Status: COMPLETED | OUTPATIENT
Start: 2025-05-05 | End: 2025-05-05

## 2025-05-05 RX ORDER — OXYCODONE HYDROCHLORIDE 30 MG/1
10 TABLET ORAL
Refills: 0 | Status: DISCONTINUED | OUTPATIENT
Start: 2025-05-05 | End: 2025-05-09

## 2025-05-05 RX ORDER — MAGNESIUM OXIDE 400 MG
400 TABLET ORAL
Refills: 0 | Status: DISCONTINUED | OUTPATIENT
Start: 2025-05-05 | End: 2025-06-13

## 2025-05-05 RX ORDER — MAGNESIUM SULFATE 500 MG/ML
2 SYRINGE (ML) INJECTION ONCE
Refills: 0 | Status: COMPLETED | OUTPATIENT
Start: 2025-05-05 | End: 2025-05-05

## 2025-05-05 RX ORDER — OXYCODONE HYDROCHLORIDE 30 MG/1
20 TABLET ORAL EVERY 6 HOURS
Refills: 0 | Status: DISCONTINUED | OUTPATIENT
Start: 2025-05-05 | End: 2025-05-09

## 2025-05-05 RX ORDER — OXYCODONE HYDROCHLORIDE 30 MG/1
15 TABLET ORAL
Refills: 0 | Status: DISCONTINUED | OUTPATIENT
Start: 2025-05-05 | End: 2025-05-09

## 2025-05-05 RX ORDER — SILVER NITRATE
6 CRYSTALS MISCELLANEOUS ONCE
Refills: 0 | Status: DISCONTINUED | OUTPATIENT
Start: 2025-05-05 | End: 2025-06-13

## 2025-05-05 RX ADMIN — Medication 220 MILLIGRAM(S): at 11:53

## 2025-05-05 RX ADMIN — QUETIAPINE FUMARATE 25 MILLIGRAM(S): 25 TABLET ORAL at 22:25

## 2025-05-05 RX ADMIN — LIDOCAINE HYDROCHLORIDE 1 PATCH: 20 JELLY TOPICAL at 23:51

## 2025-05-05 RX ADMIN — Medication 25 GRAM(S): at 17:49

## 2025-05-05 RX ADMIN — GABAPENTIN 1200 MILLIGRAM(S): 400 CAPSULE ORAL at 06:19

## 2025-05-05 RX ADMIN — Medication 25 GRAM(S): at 13:49

## 2025-05-05 RX ADMIN — LIDOCAINE HYDROCHLORIDE 1 PATCH: 20 JELLY TOPICAL at 23:52

## 2025-05-05 RX ADMIN — Medication 40 MILLIEQUIVALENT(S): at 17:49

## 2025-05-05 RX ADMIN — GABAPENTIN 1200 MILLIGRAM(S): 400 CAPSULE ORAL at 13:49

## 2025-05-05 RX ADMIN — Medication 3 MILLIGRAM(S): at 22:25

## 2025-05-05 RX ADMIN — SEVELAMER HYDROCHLORIDE 800 MILLIGRAM(S): 800 TABLET ORAL at 17:50

## 2025-05-05 RX ADMIN — OXYCODONE HYDROCHLORIDE 20 MILLIGRAM(S): 30 TABLET ORAL at 22:25

## 2025-05-05 RX ADMIN — SEVELAMER HYDROCHLORIDE 800 MILLIGRAM(S): 800 TABLET ORAL at 11:53

## 2025-05-05 RX ADMIN — Medication 25 GRAM(S): at 05:38

## 2025-05-05 RX ADMIN — CYCLOBENZAPRINE HYDROCHLORIDE 5 MILLIGRAM(S): 15 CAPSULE, EXTENDED RELEASE ORAL at 23:01

## 2025-05-05 RX ADMIN — SODIUM HYPOCHLORITE 1 APPLICATION(S): 0.12 SOLUTION TOPICAL at 05:27

## 2025-05-05 RX ADMIN — LIDOCAINE HYDROCHLORIDE 1 PATCH: 20 JELLY TOPICAL at 19:38

## 2025-05-05 RX ADMIN — GABAPENTIN 1200 MILLIGRAM(S): 400 CAPSULE ORAL at 21:23

## 2025-05-05 RX ADMIN — Medication 1 APPLICATION(S): at 11:54

## 2025-05-05 RX ADMIN — NALOXEGOL OXALATE 25 MILLIGRAM(S): 12.5 TABLET, FILM COATED ORAL at 11:53

## 2025-05-05 RX ADMIN — OXYCODONE HYDROCHLORIDE 20 MILLIGRAM(S): 30 TABLET ORAL at 22:55

## 2025-05-05 RX ADMIN — APIXABAN 5 MILLIGRAM(S): 2.5 TABLET, FILM COATED ORAL at 05:42

## 2025-05-05 RX ADMIN — Medication 500 MILLIGRAM(S): at 11:53

## 2025-05-05 RX ADMIN — LIDOCAINE HYDROCHLORIDE 1 PATCH: 20 JELLY TOPICAL at 19:37

## 2025-05-05 RX ADMIN — Medication 1 TABLET(S): at 11:53

## 2025-05-05 RX ADMIN — Medication 25 GRAM(S): at 21:22

## 2025-05-05 RX ADMIN — TAMSULOSIN HYDROCHLORIDE 0.4 MILLIGRAM(S): 0.4 CAPSULE ORAL at 21:23

## 2025-05-05 RX ADMIN — SEVELAMER HYDROCHLORIDE 800 MILLIGRAM(S): 800 TABLET ORAL at 08:03

## 2025-05-05 RX ADMIN — APIXABAN 5 MILLIGRAM(S): 2.5 TABLET, FILM COATED ORAL at 17:50

## 2025-05-05 RX ADMIN — OXYCODONE HYDROCHLORIDE 20 MILLIGRAM(S): 30 TABLET ORAL at 11:53

## 2025-05-05 RX ADMIN — Medication 1 APPLICATION(S): at 17:50

## 2025-05-05 RX ADMIN — DULOXETINE 60 MILLIGRAM(S): 20 CAPSULE, DELAYED RELEASE ORAL at 11:53

## 2025-05-05 RX ADMIN — OXYCODONE HYDROCHLORIDE 15 MILLIGRAM(S): 30 TABLET ORAL at 05:42

## 2025-05-05 RX ADMIN — CLOTRIMAZOLE 1 APPLICATION(S): 1 CREAM TOPICAL at 11:54

## 2025-05-05 RX ADMIN — OXYCODONE HYDROCHLORIDE 15 MILLIGRAM(S): 30 TABLET ORAL at 06:12

## 2025-05-05 RX ADMIN — Medication 40 MILLIGRAM(S): at 05:42

## 2025-05-05 RX ADMIN — Medication 400 MILLIGRAM(S): at 17:49

## 2025-05-05 RX ADMIN — LIDOCAINE HYDROCHLORIDE 1 PATCH: 20 JELLY TOPICAL at 11:52

## 2025-05-05 RX ADMIN — OXYCODONE HYDROCHLORIDE 15 MILLIGRAM(S): 30 TABLET ORAL at 15:41

## 2025-05-05 RX ADMIN — AMLODIPINE BESYLATE 10 MILLIGRAM(S): 10 TABLET ORAL at 05:42

## 2025-05-05 RX ADMIN — LIDOCAINE HYDROCHLORIDE 1 PATCH: 20 JELLY TOPICAL at 11:51

## 2025-05-05 NOTE — PROGRESS NOTE ADULT - ASSESSMENT
39 year old male with PMH of morbid obesity, BEA, pAFIB (not on AC), HTN, and BL papilledema attributed to pseudotumor cerebri; who initially presented to  on 2/24 with SOB and BL LE edema. Found to have URI with progressive SOB and multifocal PNA on imaging. His hospital course was complicated by worsening respiratory distress and increased 02 demands secondary to secretions (requiring multiple intubation attempts) and eventual MICU admission. While in MICU, he 02 requirements continued despite optimal vent management. Concern noted for progressive ARDS, unable to prone given morbid obesity, and ultimately cannulated for vvECMO on 2/25 and transferred to [facility] for further management on 2/26. His hospital course at [facility] was significant for the following: diuretic and ABX management, s/p trach and PEG (placed on 3/7- PEG since removed), RCU admission, high grade fevers (secondary to panniculitis), progressively worsening sacral ulcer (likely osteomyelitis- s/p surgical debridement), BL foot wound (secondary to pressor use), neuropathy (secondary to critical illness polyneuropathy), ELLA (secondary to vancomycin toxicity and overdiuresis- since DCd- with improvement). Patient now admitted for a multidisciplinary rehab program. 04-25-25 @ 15:19    Abnormal CT A/P:   - 5/2: CT A/P: Question of pneumatosis in the cecum with no other evidence of bowel ischemia. Correlation with lactate levels and clinical exam would be helpful.  - Patient has no symptoms. Abdomen is benign. Tolerating PO  - 5/2: Surgery cx noted: no intervention. c/w PO. refer to bariatric surgery post-DC dr botello at [place] or Dr. Segovia at [place] [per patient request]  - 5/3 GI cx noted: no GI intervention planned. c/w PO    left hip pain  - f/u hip xray--> no fracture  - pain control, PT per primary    #Debility Secondary to Acute Hypoxic Respiratory Failure/ARDS 2/2 PNA  #BEA (Not on CPAP)  #Critical Illness Polyneuropathy   -s/p VV ECMO, s/p diuresis, TTE/ROBERT with RV failure, s/p treatment for pneumonia  -Repeat Echo 3/11 showed EF 66 %. RV is not well visualized, enlarged RV cavity size a/ reduced RV systolic function. No significant valvular disease.  No echocardiographic evidence of pulmonary hypertension.  -s/p Trach (decannulated 3/28) and PEG (removed 4/15)   -Saturating well on RA  -Continue duoneb PRN   -BIPAP QHS (FiO2 40%, 18/10) via nasal mask   -Fall precaution  -Pain control and bowel regimen per rehab team  -Comprehensive rehab program with PT/OT/SLP  -Outpatient pulm follow up     #Possible Sacral Osteomyelitis  -Continue Zosyn TID - last dose evening of 5/11/25    #Fungemia 2/2 Panniculitis  -Blood culture 3/15 and 3/16 with candida albicans  -s/p course of antifungals   -Repeat cultures negative since 3/18    #Chronic AFIB  #Sinus Tachycardia  - Patient has intermittent tachycardia. Per patient, His HR amostly above 110 even when he is not sick. follows with cardio OP. not interested in rate control meds at this time.   - 5/3: EKG: NSR@94 bpm  - EKG 4/28 showed sinus tachycardia, no acute acute ST/T wave changes  - c/w Eliquis 5mg BID   - Not on AVN blocker   - TSH WNL 2/25  - Sinus tachycardia likely multifactorial including pain and deconditioning. Low suspicion for PE, no SOB/hypoxia and he is already on full AC      #RIJ and Bilateral LE DVT  -Duplex RUE 3/14 showed Acute, non-occlusive deep vein thrombosis noted within the right internal jugular vein. Superficial vein thrombosis noted within the right antecubital cephalic vein.  -Duplex LE 3/14 showed Acute, occlusive deep vein thromboses noted within the right and left peroneal veins at both mid calves. Age-indeterminate, occlusive deep vein thrombosis visualized within the left gastrocnemius vein at the proximal calf.  -Continue eliquis  -Repeat duplex 4/29 showed No evidence of deep venous thrombosis in either lower extremity.    #Normocytic Anemia   -Likely multifactorial  -H/H stable, no evidence of active bleed   -Monitor CBC     #HTN  -Continue Amlodipine  -Monitor BP     #Hypokalemia (Resolved)  -s/p repletion  -Monitor BMP    #Sleep/Mood  -Continue Melatonin 3mg at HS   -Continue Quetiapine 25mg at HS     #History of Pseudotumor Cerebri   -Outpatient follow up     #Stage IV Sacral Decub Ulcer  #Bilateral Buttocks Wounds  #Bilateral Foot Wounds  -5/2 CT A/p: An air and fluid containing collection in the right gluteus zurdo muscle in continuity with a subcutaneous sacral collection. No obvious skin defect to suggest a decubitus ulcer.  -Continue local wound care  - Wound vac per plastic  -MVI, vitamin C and zinc   - Off load pressure  - Plastic consult following    #ELLA (Improved)  -Baseline Cr appears to be ~0.8 range   -ELLA felt to be multifactorial in setting of cardiorenal w/ RVE, recurrent ELLA suspected due to vancomycin toxicity and diuresis   -Cr mild uptrend to 1.45 (4/28) -> 1.16 on 4/29 -> 1.03 on 5/1   -Continue to encourage oral hydration   -Continue Renvela 800mg TID, monitor phos level  -Low phos diet  -Monitor BMP    #Type 2 Diabetes  -HbA1C 6.7 on 2/25, Repeat HbA1C 4.9 on 4/28  -FS trend reviewed, has been within goal  -RAISS discontinued  -Monitor glucose on routine lab, controlled     #/Urinary Retention  -on flomax 5/1  -Monitor bladder scans     #GI PPx  -PPI    #DVT PPx  -On Eliquis    Discussed with  and  rehab team at [facility]

## 2025-05-05 NOTE — PROGRESS NOTE ADULT - ASSESSMENT
Large sacral wound with copious drainage, granulation tissue @ 6 o'clock cauterized also right buttock hypergranulation tissue cauterized  -continue wound VAC  -Discussed with Dr. Hameed

## 2025-05-05 NOTE — PROGRESS NOTE ADULT - SUBJECTIVE AND OBJECTIVE BOX
Chief Complaint:  Patient is a 39y old  Male who presents with a chief complaint of Debility secondary to acute hypoxic respiratory failure (04 May 2025 12:39)     Patient seen and examined at bedside. No event over night.       MEDICATIONS:   MEDICATIONS  (STANDING):  amLODIPine   Tablet 10 milliGRAM(s) Oral daily  ammonium lactate 12% Lotion 1 Application(s) Topical two times a day  apixaban 5 milliGRAM(s) Oral two times a day  ascorbic acid 500 milliGRAM(s) Oral daily  bisacodyl 10 milliGRAM(s) Oral at bedtime  capsaicin 0.025% Cream 1 Application(s) Topical four times a day  clotrimazole 1% Cream 1 Application(s) Topical <User Schedule>  Dakins Solution - 1/4 Strength 1 Application(s) Topical two times a day  dextrose 5%. 1000 milliLiter(s) (100 mL/Hr) IV Continuous <Continuous>  dextrose 5%. 1000 milliLiter(s) (50 mL/Hr) IV Continuous <Continuous>  dextrose 50% Injectable 25 Gram(s) IV Push once  dextrose 50% Injectable 12.5 Gram(s) IV Push once  dextrose 50% Injectable 25 Gram(s) IV Push once  DULoxetine 60 milliGRAM(s) Oral daily  gabapentin 1200 milliGRAM(s) Oral every 8 hours  glucagon  Injectable 1 milliGRAM(s) IntraMuscular once  influenza   Vaccine 0.5 milliLiter(s) IntraMuscular once  lidocaine   4% Patch 1 Patch Transdermal daily  lidocaine   4% Patch 1 Patch Transdermal daily  melatonin 3 milliGRAM(s) Oral at bedtime  multivitamin 1 Tablet(s) Oral daily  mupirocin 2% Ointment 1 Application(s) Topical two times a day  naloxegol 25 milliGRAM(s) Oral daily  pantoprazole    Tablet 40 milliGRAM(s) Oral before breakfast  piperacillin/tazobactam IVPB.. 3.375 Gram(s) IV Intermittent every 8 hours  polyethylene glycol 3350 17 Gram(s) Oral two times a day  povidone iodine 10% Solution 1 Application(s) Topical daily  QUEtiapine 25 milliGRAM(s) Oral at bedtime  sevelamer carbonate 800 milliGRAM(s) Oral three times a day with meals  tamsulosin 0.4 milliGRAM(s) Oral at bedtime  zinc sulfate 220 milliGRAM(s) Oral daily    MEDICATIONS  (PRN):  albuterol/ipratropium for Nebulization 3 milliLiter(s) Nebulizer every 6 hours PRN Shortness of Breath and/or Wheezing  dextrose Oral Gel 15 Gram(s) Oral once PRN Blood Glucose LESS THAN 70 milliGRAM(s)/deciliter  oxyCODONE    IR 20 milliGRAM(s) Oral every 6 hours PRN Severe Pain (7 - 10)  oxyCODONE    IR 15 milliGRAM(s) Oral two times a day PRN Moderate Pain (4 - 6)  oxyCODONE    IR 10 milliGRAM(s) Oral two times a day PRN Mild Pain (1 - 3)            DIET:  Diet, Regular:   Consistent Carbohydrate Evening Snack (CSTCHOSN)  Low Sodium  No Concentrated Phosphorus  Efrain(7 Gm Arginine/7 Gm Glut/1.2 Gm HMB     Qty per Day:  BID  Supplement Feeding Modality:  Oral  Ensure Max Cans or Servings Per Day:  1       Frequency:  Two Times a day (05-03-25 @ 00:02) [Active]          ALLERGIES:   Allergies    No Known Allergies    Intolerances        VITAL SIGNS:   Vital Signs Last 24 Hrs  T(C): 36.8 (05 May 2025 08:09), Max: 37.1 (04 May 2025 21:40)  T(F): 98.2 (05 May 2025 08:09), Max: 98.8 (04 May 2025 21:40)  HR: 102 (05 May 2025 08:09) (92 - 107)  BP: 126/75 (05 May 2025 08:09) (122/84 - 135/88)  BP(mean): --  RR: 16 (05 May 2025 08:09) (16 - 16)  SpO2: 98% (05 May 2025 08:09) (97% - 98%)    Parameters below as of 05 May 2025 08:09  Patient On (Oxygen Delivery Method): room air      I&O's Summary    04 May 2025 07:01  -  05 May 2025 07:00  --------------------------------------------------------  IN: 0 mL / OUT: 2000 mL / NET: -2000 mL        PHYSICAL EXAM:   GENERAL:  No acute distress  HEENT:  NC/AT, conjunctiva clear, sclera anicteric  CHEST:  No increased effort  HEART:  Regular rate  ABDOMEN:  Soft, non-tender, non-distended, positive bowel sounds  EXTREMITIES: No edema  SKIN:  Warm, dry  NEURO:  Calm, cooperative    LABS:                        8.8    6.08  )-----------( 380      ( 05 May 2025 06:19 )             30.0     Hemoglobin: 8.8 g/dL (05-05-25 @ 06:19)    05-05    138  |  101  |  12  ----------------------------<  109[H]  3.4[L]   |  33[H]  |  1.12    Ca    9.4      05 May 2025 06:19    TPro  7.7  /  Alb  2.3[L]  /  TBili  0.3  /  DBili  x   /  AST  14  /  ALT  19  /  AlkPhos  57  05-05    LIVER FUNCTIONS - ( 05 May 2025 06:19 )  Alb: 2.3 g/dL / Pro: 7.7 g/dL / ALK PHOS: 57 U/L / ALT: 19 U/L / AST: 14 U/L / GGT: x             RADIOLOGY & ADDITIONAL STUDIES:      ACC: [id number] EXAM:  CT ABDOMEN AND PELVIS WAW IC   ORDERED BY: SANDY DILLON     PROCEDURE DATE:  05/01/2025          INTERPRETATION:  CLINICAL INFORMATION: Reevaluate sacral wound.    ADDITIONAL CLINICAL INFORMATION: Other, Non-specified    COMPARISON: 4/3/2025.    CONTRAST/COMPLICATIONS:  IV Contrast: Omnipaque 350  120 cc administered   30 cc discarded  Oral Contrast: NONE  .    PROCEDURE:  CT of the Abdomen and Pelvis was performed.  Precontrast, Arterial and Delayed phases were performed.  Sagittal and coronal reformats were performed.    FINDINGS:  LOWER CHEST: Bibasilar subsegmental atelectasis.    LIVER: Steatosis.  BILE DUCTS: Normal caliber.  GALLBLADDER: Within normal limits.  SPLEEN: Within normal limits.  PANCREAS: Within normal limits.  ADRENALS: Within normal limits.  KIDNEYS/URETERS: Within normal limits.    BLADDER: Within normal limits. Trace free fluid in the presacral space is   reactive.  REPRODUCTIVE ORGANS: Prostate within normal limits.    BOWEL: No bowel obstruction. Small hiatal hernia. Appendix is normal.   Question of pneumatosis in the cecum. The bowel is otherwise normal.  PERITONEUM/RETROPERITONEUM: Within normal limits.  VESSELS: Patent mesenteric vasculature.  LYMPH NODES: No lymphadenopathy.  ABDOMINAL WALL: Subcutaneous edema in bilateral gluteal region.   Enlargement of right gluteus zurdo muscle with an air and fluid   containing collection of 9.8 x 2.4 cm which is in continuity with a   subcutaneous sacral collection of 5.8 x 4.4 cm. Noassociated skin   defect. Significant subcutaneous fatty stranding.  BONES: Degenerative changes.    IMPRESSION:  An air and fluid containing collection in the right gluteus zurdo   muscle in continuity with a subcutaneous sacral collection. No obvious   skin defect to suggest a decubitus ulcer.  Question of pneumatosis in the cecum with no other evidence of bowel   ischemia. Correlation with lactate levels and clinical exam would be   helpful.    --- End of Report ---            ADAL ABARCA MD; Attending Radiologist  This document has been electronically signed. May  2 2025  1:48PM

## 2025-05-05 NOTE — PROGRESS NOTE ADULT - SUBJECTIVE AND OBJECTIVE BOX
Subjective  Seen and examined at bedside this AM. with partner at bedside  Interval events from yesterday, bleeding from some areas of sacral wound  Slept well, eating well  Reports left hip pain present with movement, limiting his mobility    ROS--no acute pain, no fever or chest pain   B/l leg and sacral ulcers  LBM 5/4    Function --Engagement in therapy limited by left hip pain       Vital Signs Last 24 Hrs  T(C): 36.8 (05 May 2025 08:09), Max: 37.1 (04 May 2025 21:40)  T(F): 98.2 (05 May 2025 08:09), Max: 98.8 (04 May 2025 21:40)  HR: 102 (05 May 2025 08:09) (92 - 107)  BP: 126/75 (05 May 2025 08:09) (122/84 - 135/88)  RR: 16 (05 May 2025 08:09) (16 - 16)  SpO2: 98% (05 May 2025 08:09) (97% - 98  O2 Parameters below as of 05 May 2025 08:09  Patient On (Oxygen Delivery Method): room air      EXAM   Gen -  Comfortable, o  HEENT - NCAT, EOMI, MMM  Neck - Supple, No limited ROM  Pulm - CTAB, No wheeze, No rhonchi, No crackles  Cardiovascular - RRR, S1S2, No murmurs  Abdomen - Soft, non tender, +BS  Extremities - chronic skin changes consistent with elephantiasis, midline present RUE  Edema reducing     Neuro-  AAO X 3, Speech is normal     Motor - UE 5/5                    LEFT    LE - HF 1/5, KE 2/5 limited by bed size and body habitus, DF 1/5, PF 5/5--left hip/leg ROM limited by pain                     RIGHT LE - HF 3/5, KE 2/5 limited by bed size and body habitus , DF 1/5, PF 5/5        Sensory - Decreased to light touch bilateral lower extremities      Coordination - impaired lower extremities   Psychiatric - Mood stable, Affect WNL    Skin:  R foot plantar aspect lateral/distal side 7cm x 5.5cm ischemic wound from pressors  RLE 4th digit 1x1.5cm ischemic induced wound from pressors  RLE 5th digit 3x2cm ischemic induced wound from pressors  L foot plantar aspect lateral/distal side 6x7cm ischemic induced wound from pressor usage  LLE 4th digit 2x1cm ischemic induced wound from pressors  LLE 5th digit 2.5x3cm ischemic induced wound from pressors   R groin skin tear 1x1.5cm  Prior PEG insertion site 0.5x1cm   Former trach site 1x0.5cm  Unstageable pressure wound cocyx 7cmx4.5cm w/ 2.5cm depth  R buttock pressure induced wound semi contiguous with coccyx wound calling unstageable given prior debridement 8.5x5.5cm  L buttock 1.0x0.5cm stage 3 pressure injury  R buttock skin tear 3.5cm x 0.5cm and 1.5cmx0.5cm    RECENT LABS/IMAGING                        8.8    6.08  )-----------( 380      ( 05 May 2025 06:19 )             30.0     05-05    138  |  101  |  12  ----------------------------<  109[H]  3.4[L]   |  33[H]  |  1.12    Ca    9.4      05 May 2025 06:19    TPro  7.7  /  Alb  2.3[L]  /  TBili  0.3  /  DBili  x   /  AST  14  /  ALT  19  /  AlkPhos  57  05-05      Urinalysis Basic - ( 05 May 2025 06:19 )    Color: x / Appearance: x / SG: x / pH: x  Gluc: 109 mg/dL / Ketone: x  / Bili: x / Urobili: x   Blood: x / Protein: x / Nitrite: x   Leuk Esterase: x / RBC: x / WBC x   Sq Epi: x / Non Sq Epi: x / Bacteria: x    MEDICATIONS  (STANDING):  amLODIPine   Tablet 10 milliGRAM(s) Oral daily  ammonium lactate 12% Lotion 1 Application(s) Topical two times a day  apixaban 5 milliGRAM(s) Oral two times a day  ascorbic acid 500 milliGRAM(s) Oral daily  bisacodyl 10 milliGRAM(s) Oral at bedtime  capsaicin 0.025% Cream 1 Application(s) Topical four times a day  clotrimazole 1% Cream 1 Application(s) Topical <User Schedule>  Dakins Solution - 1/4 Strength 1 Application(s) Topical two times a day  dextrose 5%. 1000 milliLiter(s) (100 mL/Hr) IV Continuous <Continuous>  dextrose 5%. 1000 milliLiter(s) (50 mL/Hr) IV Continuous <Continuous>  dextrose 50% Injectable 25 Gram(s) IV Push once  dextrose 50% Injectable 12.5 Gram(s) IV Push once  dextrose 50% Injectable 25 Gram(s) IV Push once  DULoxetine 60 milliGRAM(s) Oral daily  gabapentin 1200 milliGRAM(s) Oral every 8 hours  glucagon  Injectable 1 milliGRAM(s) IntraMuscular once  influenza   Vaccine 0.5 milliLiter(s) IntraMuscular once  lidocaine   4% Patch 1 Patch Transdermal daily  lidocaine   4% Patch 1 Patch Transdermal daily  melatonin 3 milliGRAM(s) Oral at bedtime  multivitamin 1 Tablet(s) Oral daily  mupirocin 2% Ointment 1 Application(s) Topical two times a day  naloxegol 25 milliGRAM(s) Oral daily  pantoprazole    Tablet 40 milliGRAM(s) Oral before breakfast  piperacillin/tazobactam IVPB.. 3.375 Gram(s) IV Intermittent every 8 hours  polyethylene glycol 3350 17 Gram(s) Oral two times a day  povidone iodine 10% Solution 1 Application(s) Topical daily  QUEtiapine 25 milliGRAM(s) Oral at bedtime  sevelamer carbonate 800 milliGRAM(s) Oral three times a day with meals  silver nitrate Applicator 6 Application(s) Topical once  tamsulosin 0.4 milliGRAM(s) Oral at bedtime  zinc sulfate 220 milliGRAM(s) Oral daily    MEDICATIONS  (PRN):  albuterol/ipratropium for Nebulization 3 milliLiter(s) Nebulizer every 6 hours PRN Shortness of Breath and/or Wheezing  dextrose Oral Gel 15 Gram(s) Oral once PRN Blood Glucose LESS THAN 70 milliGRAM(s)/deciliter  oxyCODONE    IR 20 milliGRAM(s) Oral every 6 hours PRN Severe Pain (7 - 10)  oxyCODONE    IR 15 milliGRAM(s) Oral two times a day PRN Moderate Pain (4 - 6)  oxyCODONE    IR 10 milliGRAM(s) Oral two times a day PRN Mild Pain (1 - 3)          < from: Xray Hip 1 View, Left (05.03.25 @ 13:18) >    PROCEDURE DATE:  05/03/2025          INTERPRETATION:  Radiographs of the LEFT hip    CLINICAL INFORMATION:  Injury with   Pain.    TECHNIQUE:   AP and oblique views of the hip.    FINDINGS:   No prior exams are available for comparison.    No fracture or dislocation.  Femoral acetabular joint space radiographically intact.  No gross soft tissue abnormality.      IMPRESSION:    No acute radiographic osseous pathology..  Please review  pelvic CT report 5/1/2025    --- End of Report ---

## 2025-05-05 NOTE — PROGRESS NOTE ADULT - ASSESSMENT
39 year old male with PMH of morbid obesity, BEA on CPAP, pAFIB (not on AC), HTN, and BL papilledema attributed to pseudotumor cerebri; who initially presented to  on 2/24 with SOB and BL LE edema. Found to have URI with progressive SOB and multifocal PNA on imaging. His hospital course was complicated by worsening respiratory distress and increased 02 demands secondary to secretions (requiring multiple intubation attempts) and eventual MICU admission. While in MICU, he 02 requirements continued despite optimal vent management. Concern noted for progressive ARDS, unable to prone given morbid obesity, and ultimately cannulated for vvECMO on 2/25 and transferred to [facility] for further management on 2/26.  His hospital course at [facility] was significant for the following: diuretic and ABX management, s/p trache and PEG (placed on 3/7- PEG since removed), RCU admission, high grade fevers (secondary to panniculitis), progressively worsening sacral ulcer (likely osteomyelitis- s/p surgical debridement), BL foot wound (secondary to pressor use), neuropathy (secondary to critical illness polyneuropathy), ELLA (secondary to vancomycin toxicity and overdiuresis- since DCd- with improvement). Patient now admitted for a multidisciplinary rehab program. 04-25-25 @ 15:19    * Pastic surg consult recs appreciated c/w sacral wound vac  * L hip xray--no acute findings   * Labs discussed--Cr stable   * PRAFO boots at HS       #Critical illness myopathy due to respiratory failure and sepsis  (present on admission)   - Gait Instability, ADL impairments and Functional impairments: start Comprehensive Rehab Program of PT/OT  - 3 hours a day, 5 days a week   - PRN: Nebulizer QID     BEA on BIPAP  --via nasal canula qhs    #Sacral Osteomyelitis  - Zosyn TID - last dose evening of 5/11/25    #Paroxysmal AFIB  - Eliquis 5mg BID     #Calf Tenderness  - BL LE doppler negative     #L hip pain--xray- no acute findings     #HTN  - Amlodipine 10mg dialy     #Sleep/Mood  - Melatonin 3mg at HS   - Quetiapine 25mg at HS     #Skin  - LUQ surgical wound previous site of PEG tube and right groin- Clean wound and periwound skin with NS. Pat dry. Cover with small silicone foam with border. Change every other day or prn if soiled     Sacral/gluteal cleft to bilateral buttocks- Irrigate deep cavity and tunneling with Dakins solution 1/4 strength using peribottle or flushes, cleanse wound and satellite ulcerations to b/l buttocks with NS, Dry well. Apply Liquid barrier film to periwound skin. Apply TRIAD barrier paste to isolated ulcer overlying left buttock,  Apply Aquacel hydrofiber sheet to right buttock, pack sacral/gluteal cleft including deep tunnels with Dakins 1/4 strength moistened kerlix, leave at least 2" out at end to ensure full removal of packing with subsequent dressing changes. Cover entire area (sacrum and right buttock) with abdominal pad and tegaderm. Change twice a day and prn if soiled/compromised. Once dressing is in place and perineal care is provided, clean remaining skin with perineal spray, apply TRIAD moisture barrier cream to exposed skin every shift and prn.    Bilateral abdominal pannus, bilateral groin: Cleanse with luke-warm soap and water, dry well. Apply clotrimazole cream daily, followed by Interdry textile sheeting, under intertriginous folds leaving 2 inches exposed at ends to wick, remove to wash & dry affected area, then replace. Individual sheeting may be used for up to 5 days unless soiled. Inspect skin daily.     - L foot wound- betadine to followed by 4x4 gauze, ABD pad, and matilde daily  - R foot wound- mupirocin BID     -Continue to offload pressure; bariatric low airloss support surface, turn and position per protocol with use of fluidized positioning devices, continue incontinence and moisture care per protocol and use of single breathable incontinence pads, continue to offload heels with fluidized positioning devices/Ochoa-Lock positioning device (PS# [id number]). Continue use of bariatric seat cushion (people soft #[id number]) and limit sit time- no more than 2 consecutive hours at any given time.   - Pressure injury/Skin: OOB to Chair, PT/OT    - Ammonium lactate to BL LEs     #Neuropathy  - Cymbalta 60mg daily   - Gabapentin 1200mg TID     #Pain Mgmt   - Capsaicin cream to BL feet QID - Avoid use on damaged, broken, or irritated skin. Avoid occlusive dressing or heat   - Lidocaine patch to BL thighs   - PRN: Cyclobenzaprine 5mg TID; Oxycodone 5mg BID; Oxycodone 15mg daily (prior to dressing change); Tylenol 650mg QID     #GI/Bowel Mgmt   - Pantoprazole  - Bisacodyl 10mg at HS   - Miralax   - Naloxegal 25mg daily     #/Bladder Mgmt   #ELLA  #Urinary Retention  - Renvela 800mg TID  - Cr improving   - Bladder scan TID  - Flomax 0.4mg at HS     #FEN --Morbid obesity (BMI > 40).  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 DYSONs Liaison with Family/Providers:    5/5--D/w Plastics team, they did some bedside debridement of bleeding areas on sacral wound.,They recommended to continue VAC dressing     5/5--Girlfriend present at bedside, participated during review,   Discussed treatment plan with her   -----------------------------------------------  OUTPATIENT/FOLLOW UP:    Your Primary Care Provider,   Phone: (   )    -  Fax: (   )    -  Follow Up Time: 1 week    [facility] [place],   [address] [place] [address]  Phone: [phone]  Fax: (   )    -  Follow Up Time:    Dr. Waterhouse  Podiatry  [phone]  Within 1 week   -----------------------------------------------  Time based billing   Patient engaged for this review, discussion of test results, liasion with plastics team, and care co ordination

## 2025-05-05 NOTE — PROGRESS NOTE ADULT - ASSESSMENT
39 year old male with PMH of morbid obesity, BEA on CPAP, pAFIB (not on AC), HTN, and BL papilledema attributed to pseudotumor cerebri; who initially presented to  on 2/24 with SOB and BL LE edema. Found to have URI with progressive SOB and multifocal PNA on imaging. His hospital course was complicated by worsening respiratory distress and increased 02 demands secondary to secretions (requiring multiple intubation attempts) and eventual MICU admission. While in MICU, he 02 requirements continued despite optimal vent management. Concern noted for progressive ARDS, unable to prone given morbid obesity, and ultimately cannulated for vvECMO on 2/25 and transferred to [facility] for further management on 2/26.  His hospital course at [facility] was significant for the following: diuretic and ABX management, s/p trache and PEG (placed on 3/7- PEG since removed), RCU admission, high grade fevers (secondary to panniculitis), progressively worsening sacral ulcer (likely osteomyelitis- s/p surgical debridement), BL foot wound (secondary to pressor use), neuropathy (secondary to critical illness polyneuropathy), ELLA (secondary to vancomycin toxicity and overdiuresis- since DCd- with improvement). Patient now admitted for a multidisciplinary rehab program.     GI consulted as recent CT A/P revealing possible pneumatosis of cecum

## 2025-05-05 NOTE — PROGRESS NOTE ADULT - SUBJECTIVE AND OBJECTIVE BOX
(x  ) No complaints (  ) Complains of:     T(F): 98.2 (05-05-25 @ 08:09), Max: 98.8 (05-04-25 @ 21:40)  HR: 102 (05-05-25 @ 08:09) (92 - 107)  BP: 126/75 (05-05-25 @ 08:09) (122/84 - 135/88)  RR: 16 (05-05-25 @ 08:09) (16 - 16)  SpO2: 98% (05-05-25 @ 08:09) (97% - 98%)        Wound Location sacral wound large granulating with much undermining and tunnelling, granulation tissue at 6 o'clock cauterized with silver nitrate.  Also granulomatous tissue cheek also cauterized          [x  ] Drainage: (  ) serous (  ) sero-sanguinous (  ) sanguinous (  ) purulent (  ) cloudy (  ) clear  ( x ) other: exudative with small amount red blood, no active bleeding from wound                          8.8    6.08  )-----------( 380      ( 05 May 2025 06:19 )             30.0     CBC Full  -  ( 05 May 2025 06:19 )  WBC Count : 6.08 K/uL  RBC Count : 3.51 M/uL  Hemoglobin : 8.8 g/dL  Hematocrit : 30.0 %  Platelet Count - Automated : 380 K/uL  Mean Cell Volume : 85.5 fl  Mean Cell Hemoglobin : 25.1 pg  Mean Cell Hemoglobin Concentration : 29.3 g/dL  Auto Neutrophil # : x  Auto Lymphocyte # : x  Auto Monocyte # : x  Auto Eosinophil # : x  Auto Basophil # : x  Auto Neutrophil % : x  Auto Lymphocyte % : x  Auto Monocyte % : x  Auto Eosinophil % : x  Auto Basophil % : x    138|101|12<109  3.4|33|1.12  9.4,--,--  05-05 @ 06:19      Urinalysis Basic - ( 05 May 2025 06:19 )    Color: x / Appearance: x / SG: x / pH: x  Gluc: 109 mg/dL / Ketone: x  / Bili: x / Urobili: x   Blood: x / Protein: x / Nitrite: x   Leuk Esterase: x / RBC: x / WBC x   Sq Epi: x / Non Sq Epi: x / Bacteria: x                Procedure Performed:  (x  )Yes     (  )No  Name of Procedure:  (  )debridement    (  )I&D    (x  )Other: chemical cauterization with silver nitrate sticks sacral and right buttock wounds/ hypergranulation tissues  (  )partial thickness     (  )full thickness     (  )subcutaneous     (  )muscle/tendon     (  )bone  (  )sharp     (  )surgical

## 2025-05-05 NOTE — PROGRESS NOTE ADULT - SUBJECTIVE AND OBJECTIVE BOX
Medicine Progress Note    Patient is a 39y old  Male who presents with a chief complaint of Debility secondary to acute hypoxic respiratory failure (05 May 2025 14:02)      SUBJECTIVE / OVERNIGHT EVENTS:  no complaints      ROS:  denied fever/chills/CP/SOB/cough/palpitation/dizziness/abdominal pian/nausea/vomiting/diarrhoea/constipation/dysuria/leg or calf pain/headaches.all other ROS neg    MEDICATIONS  (STANDING):  amLODIPine   Tablet 10 milliGRAM(s) Oral daily  ammonium lactate 12% Lotion 1 Application(s) Topical two times a day  apixaban 5 milliGRAM(s) Oral two times a day  ascorbic acid 500 milliGRAM(s) Oral daily  bisacodyl 10 milliGRAM(s) Oral at bedtime  capsaicin 0.025% Cream 1 Application(s) Topical four times a day  clotrimazole 1% Cream 1 Application(s) Topical <User Schedule>  Dakins Solution - 1/4 Strength 1 Application(s) Topical two times a day  dextrose 5%. 1000 milliLiter(s) (100 mL/Hr) IV Continuous <Continuous>  dextrose 5%. 1000 milliLiter(s) (50 mL/Hr) IV Continuous <Continuous>  dextrose 50% Injectable 25 Gram(s) IV Push once  dextrose 50% Injectable 12.5 Gram(s) IV Push once  dextrose 50% Injectable 25 Gram(s) IV Push once  DULoxetine 60 milliGRAM(s) Oral daily  gabapentin 1200 milliGRAM(s) Oral every 8 hours  glucagon  Injectable 1 milliGRAM(s) IntraMuscular once  influenza   Vaccine 0.5 milliLiter(s) IntraMuscular once  lidocaine   4% Patch 1 Patch Transdermal daily  lidocaine   4% Patch 1 Patch Transdermal daily  melatonin 3 milliGRAM(s) Oral at bedtime  multivitamin 1 Tablet(s) Oral daily  mupirocin 2% Ointment 1 Application(s) Topical two times a day  naloxegol 25 milliGRAM(s) Oral daily  pantoprazole    Tablet 40 milliGRAM(s) Oral before breakfast  piperacillin/tazobactam IVPB.. 3.375 Gram(s) IV Intermittent every 8 hours  polyethylene glycol 3350 17 Gram(s) Oral two times a day  povidone iodine 10% Solution 1 Application(s) Topical daily  QUEtiapine 25 milliGRAM(s) Oral at bedtime  sevelamer carbonate 800 milliGRAM(s) Oral three times a day with meals  silver nitrate Applicator 6 Application(s) Topical once  tamsulosin 0.4 milliGRAM(s) Oral at bedtime  zinc sulfate 220 milliGRAM(s) Oral daily    MEDICATIONS  (PRN):  albuterol/ipratropium for Nebulization 3 milliLiter(s) Nebulizer every 6 hours PRN Shortness of Breath and/or Wheezing  dextrose Oral Gel 15 Gram(s) Oral once PRN Blood Glucose LESS THAN 70 milliGRAM(s)/deciliter  oxyCODONE    IR 20 milliGRAM(s) Oral every 6 hours PRN Severe Pain (7 - 10)  oxyCODONE    IR 15 milliGRAM(s) Oral two times a day PRN Moderate Pain (4 - 6)  oxyCODONE    IR 10 milliGRAM(s) Oral two times a day PRN Mild Pain (1 - 3)    CAPILLARY BLOOD GLUCOSE        I&O's Summary    04 May 2025 07:01  -  05 May 2025 07:00  --------------------------------------------------------  IN: 0 mL / OUT: 2000 mL / NET: -2000 mL        PHYSICAL EXAM:  Vital Signs Last 24 Hrs  T(C): 36.8 (05 May 2025 08:09), Max: 37.1 (04 May 2025 21:40)  T(F): 98.2 (05 May 2025 08:09), Max: 98.8 (04 May 2025 21:40)  HR: 102 (05 May 2025 08:09) (92 - 107)  BP: 126/75 (05 May 2025 08:09) (122/84 - 135/88)  BP(mean): --  RR: 16 (05 May 2025 08:09) (16 - 16)  SpO2: 98% (05 May 2025 08:09) (97% - 98%)    Parameters below as of 05 May 2025 08:09  Patient On (Oxygen Delivery Method): room air        GENERAL: Not in distress. Alert . morbidly obese  HEENT: clear conjuctiva, MMM. no pallor or icterus  CARDIOVASCULAR: RRR S1, S2. No murmur/rubs/gallop  LUNGS: BLAE+, no rales, no wheezing, no rhonchi.    ABDOMEN: ND. Soft,  NT, no guarding / rebound / rigidity. BS normoactive  EXTREMITIES: b/l LE edema. no leg or calf TP. no hip TP  SKIN: warm and dry.   PSYCHIATRIC: Calm.  No agitation.  CNS: AAO*3. moves limbs, follows commands. b.l LE weakness    LABS:                        8.8    6.08  )-----------( 380      ( 05 May 2025 06:19 )             30.0     05-05    138  |  101  |  12  ----------------------------<  109[H]  3.4[L]   |  33[H]  |  1.12    Ca    9.4      05 May 2025 06:19    TPro  7.7  /  Alb  2.3[L]  /  TBili  0.3  /  DBili  x   /  AST  14  /  ALT  19  /  AlkPhos  57  05-05          Urinalysis Basic - ( 05 May 2025 06:19 )    Color: x / Appearance: x / SG: x / pH: x  Gluc: 109 mg/dL / Ketone: x  / Bili: x / Urobili: x   Blood: x / Protein: x / Nitrite: x   Leuk Esterase: x / RBC: x / WBC x   Sq Epi: x / Non Sq Epi: x / Bacteria: x        COVID-19 PCR: NotDetec (25 Apr 2025 18:31)  SARS-CoV-2: NotDetec (16 Mar 2025 16:35)  SARS-CoV-2: NotDetec (12 Mar 2025 07:19)  SARS-CoV-2: NotDetec (26 Feb 2025 02:27)  SARS-CoV-2: NotDetec (24 Feb 2025 18:19)      RADIOLOGY & ADDITIONAL TESTS:  Imaging from Last 24 Hours:    Electrocardiogram/QTc Interval:    COORDINATION OF CARE:  Care Discussed with Consultants/Other Providers:

## 2025-05-05 NOTE — PROGRESS NOTE ADULT - PROBLEM SELECTOR PLAN 1
CT scan of A/P on 5/1/2025 w Question of pneumatosis in the cecum with no other evidence of bowel   ischemia.   No leukocytosis, patient afebrile, no abdominal pain, no active signs of infection  Check lactate level  Continue regular diet  Continue supportive care   No GI intervention recommended at this time.

## 2025-05-06 PROCEDURE — 72192 CT PELVIS W/O DYE: CPT | Mod: 26

## 2025-05-06 PROCEDURE — 99232 SBSQ HOSP IP/OBS MODERATE 35: CPT

## 2025-05-06 RX ORDER — CELECOXIB 50 MG/1
100 CAPSULE ORAL
Refills: 0 | Status: DISCONTINUED | OUTPATIENT
Start: 2025-05-06 | End: 2025-05-08

## 2025-05-06 RX ORDER — CELECOXIB 50 MG/1
200 CAPSULE ORAL EVERY 12 HOURS
Refills: 0 | Status: DISCONTINUED | OUTPATIENT
Start: 2025-05-06 | End: 2025-05-06

## 2025-05-06 RX ORDER — TAMSULOSIN HYDROCHLORIDE 0.4 MG/1
0.8 CAPSULE ORAL AT BEDTIME
Refills: 0 | Status: DISCONTINUED | OUTPATIENT
Start: 2025-05-06 | End: 2025-06-10

## 2025-05-06 RX ADMIN — Medication 400 MILLIGRAM(S): at 17:06

## 2025-05-06 RX ADMIN — Medication 25 GRAM(S): at 13:12

## 2025-05-06 RX ADMIN — Medication 3 MILLIGRAM(S): at 21:58

## 2025-05-06 RX ADMIN — SEVELAMER HYDROCHLORIDE 800 MILLIGRAM(S): 800 TABLET ORAL at 17:05

## 2025-05-06 RX ADMIN — SEVELAMER HYDROCHLORIDE 800 MILLIGRAM(S): 800 TABLET ORAL at 11:48

## 2025-05-06 RX ADMIN — OXYCODONE HYDROCHLORIDE 20 MILLIGRAM(S): 30 TABLET ORAL at 10:07

## 2025-05-06 RX ADMIN — GABAPENTIN 1200 MILLIGRAM(S): 400 CAPSULE ORAL at 21:58

## 2025-05-06 RX ADMIN — GABAPENTIN 1200 MILLIGRAM(S): 400 CAPSULE ORAL at 13:03

## 2025-05-06 RX ADMIN — Medication 25 GRAM(S): at 05:59

## 2025-05-06 RX ADMIN — QUETIAPINE FUMARATE 25 MILLIGRAM(S): 25 TABLET ORAL at 21:58

## 2025-05-06 RX ADMIN — Medication 25 GRAM(S): at 21:58

## 2025-05-06 RX ADMIN — GABAPENTIN 1200 MILLIGRAM(S): 400 CAPSULE ORAL at 05:59

## 2025-05-06 RX ADMIN — SEVELAMER HYDROCHLORIDE 800 MILLIGRAM(S): 800 TABLET ORAL at 08:18

## 2025-05-06 RX ADMIN — Medication 40 MILLIGRAM(S): at 06:02

## 2025-05-06 RX ADMIN — APIXABAN 5 MILLIGRAM(S): 2.5 TABLET, FILM COATED ORAL at 06:00

## 2025-05-06 RX ADMIN — LIDOCAINE HYDROCHLORIDE 1 PATCH: 20 JELLY TOPICAL at 20:00

## 2025-05-06 RX ADMIN — TAMSULOSIN HYDROCHLORIDE 0.8 MILLIGRAM(S): 0.4 CAPSULE ORAL at 21:58

## 2025-05-06 RX ADMIN — Medication 400 MILLIGRAM(S): at 11:48

## 2025-05-06 RX ADMIN — LIDOCAINE HYDROCHLORIDE 1 PATCH: 20 JELLY TOPICAL at 23:08

## 2025-05-06 RX ADMIN — Medication 400 MILLIGRAM(S): at 08:18

## 2025-05-06 RX ADMIN — LIDOCAINE HYDROCHLORIDE 1 PATCH: 20 JELLY TOPICAL at 11:49

## 2025-05-06 RX ADMIN — Medication 1 APPLICATION(S): at 21:51

## 2025-05-06 RX ADMIN — Medication 220 MILLIGRAM(S): at 11:48

## 2025-05-06 RX ADMIN — CELECOXIB 100 MILLIGRAM(S): 50 CAPSULE ORAL at 17:26

## 2025-05-06 RX ADMIN — SODIUM HYPOCHLORITE 1 APPLICATION(S): 0.12 SOLUTION TOPICAL at 05:14

## 2025-05-06 RX ADMIN — NALOXEGOL OXALATE 25 MILLIGRAM(S): 12.5 TABLET, FILM COATED ORAL at 11:48

## 2025-05-06 RX ADMIN — Medication 500 MILLIGRAM(S): at 17:05

## 2025-05-06 RX ADMIN — AMLODIPINE BESYLATE 10 MILLIGRAM(S): 10 TABLET ORAL at 06:01

## 2025-05-06 RX ADMIN — Medication 1 TABLET(S): at 16:48

## 2025-05-06 RX ADMIN — APIXABAN 5 MILLIGRAM(S): 2.5 TABLET, FILM COATED ORAL at 17:06

## 2025-05-06 RX ADMIN — DULOXETINE 60 MILLIGRAM(S): 20 CAPSULE, DELAYED RELEASE ORAL at 11:47

## 2025-05-06 RX ADMIN — CELECOXIB 100 MILLIGRAM(S): 50 CAPSULE ORAL at 17:25

## 2025-05-06 RX ADMIN — OXYCODONE HYDROCHLORIDE 20 MILLIGRAM(S): 30 TABLET ORAL at 09:07

## 2025-05-06 RX ADMIN — LIDOCAINE HYDROCHLORIDE 1 PATCH: 20 JELLY TOPICAL at 23:07

## 2025-05-06 NOTE — PHARMACOTHERAPY INTERVENTION NOTE - COMMENTS
I recommended topical analgesic due to increased risk of bleed when using Eliquis with NSAIDs. Prescriber is aware.

## 2025-05-06 NOTE — PROGRESS NOTE ADULT - SUBJECTIVE AND OBJECTIVE BOX
Subjective  Seen and examined at bedside this AM. with partner at bedside  Interval left hip pain with movement, continues and limited therapy  Slept well, eating well  VAC device malfunction for limited time last night, but now functioning well    ROS--no acute pain, no fever or chest pain   B/l leg and sacral ulcers  Left hip pain with movement  LBM 5/5    Function --Engagement in therapy limited by left hip pain for second day  Tolerating stationary exercises in bed, pain precipitated by left leg movement     Vital Signs Last 24 Hrs  T(C): 36.8 (06 May 2025 08:25), Max: 37.1 (05 May 2025 21:25)  T(F): 98.2 (06 May 2025 08:25), Max: 98.8 (05 May 2025 21:25)  HR: 100 (06 May 2025 08:25) (82 - 106)  BP: 121/75 (06 May 2025 08:25) (118/68 - 121/75)  RR: 16 (06 May 2025 08:25) (16 - 16)  SpO2: 98% (06 May 2025 08:25) (96% - 98%)  O2 Parameters below as of 06 May 2025 08:25  Patient On (Oxygen Delivery Method): room air      EXAM   Gen -  Comfortable, at rest, but pain with left leg movement   HEENT - NCAT, EOMI, MMM  Neck - Supple, No limited ROM  Pulm - CTAB, No wheeze, No rhonchi, No crackles  Cardiovascular - RRR, S1S2, No murmurs  Abdomen - Soft, non tender, +BS  Extremities - chronic skin changes consistent with elephantiasis, midline present RUE  Edema reducing     Neuro-  AAO X 3, Speech is normal     Motor - UE 5/5                    LEFT    LE - HF 1/5, KE 2/5 limited by bed size and body habitus, DF 1/5, PF 5/5--left hip/leg ROM limited by pain                     RIGHT LE - HF 3/5, KE 2/5 limited by bed size and body habitus , DF 1/5, PF 5/5        Sensory - Decreased to light touch bilateral lower extremities      Coordination - impaired lower extremities   Psychiatric - Mood stable, Affect WNL    Skin:  R foot plantar aspect lateral/distal side 7cm x 5.5cm ischemic wound from pressors  RLE 4th digit 1x1.5cm ischemic induced wound from pressors  RLE 5th digit 3x2cm ischemic induced wound from pressors  L foot plantar aspect lateral/distal side 6x7cm ischemic induced wound from pressor usage  LLE 4th digit 2x1cm ischemic induced wound from pressors  LLE 5th digit 2.5x3cm ischemic induced wound from pressors   R groin skin tear 1x1.5cm  Prior PEG insertion site 0.5x1cm   Former trach site 1x0.5cm  Unstageable pressure wound cocyx 7cmx4.5cm w/ 2.5cm depth  R buttock pressure induced wound semi contiguous with coccyx wound calling unstageable given prior debridement 8.5x5.5cm  L buttock 1.0x0.5cm stage 3 pressure injury  R buttock skin tear 3.5cm x 0.5cm and 1.5cmx0.5cm    MMS--Mod to severe pain with left leg movement       RECENT LABS/IMAGING                        8.8    6.08  )-----------( 380      ( 05 May 2025 06:19 )             30.0     05-05    138  |  101  |  12  ----------------------------<  109[H]  3.4[L]   |  33[H]  |  1.12    Ca    9.4      05 May 2025 06:19    TPro  7.7  /  Alb  2.3[L]  /  TBili  0.3  /  DBili  x   /  AST  14  /  ALT  19  /  AlkPhos  57  05-05      Urinalysis Basic - ( 05 May 2025 06:19 )    Color: x / Appearance: x / SG: x / pH: x  Gluc: 109 mg/dL / Ketone: x  / Bili: x / Urobili: x   Blood: x / Protein: x / Nitrite: x   Leuk Esterase: x / RBC: x / WBC x   Sq Epi: x / Non Sq Epi: x / Bacteria: x    MEDICATIONS  (STANDING):  amLODIPine   Tablet 10 milliGRAM(s) Oral daily  ammonium lactate 12% Lotion 1 Application(s) Topical two times a day  apixaban 5 milliGRAM(s) Oral two times a day  ascorbic acid 500 milliGRAM(s) Oral daily  bisacodyl 10 milliGRAM(s) Oral at bedtime  capsaicin 0.025% Cream 1 Application(s) Topical four times a day  clotrimazole 1% Cream 1 Application(s) Topical <User Schedule>  Dakins Solution - 1/4 Strength 1 Application(s) Topical two times a day  dextrose 5%. 1000 milliLiter(s) (100 mL/Hr) IV Continuous <Continuous>  dextrose 5%. 1000 milliLiter(s) (50 mL/Hr) IV Continuous <Continuous>  dextrose 50% Injectable 25 Gram(s) IV Push once  dextrose 50% Injectable 12.5 Gram(s) IV Push once  dextrose 50% Injectable 25 Gram(s) IV Push once  DULoxetine 60 milliGRAM(s) Oral daily  gabapentin 1200 milliGRAM(s) Oral every 8 hours  glucagon  Injectable 1 milliGRAM(s) IntraMuscular once  influenza   Vaccine 0.5 milliLiter(s) IntraMuscular once  lidocaine   4% Patch 1 Patch Transdermal daily  lidocaine   4% Patch 1 Patch Transdermal daily  magnesium oxide 400 milliGRAM(s) Oral three times a day with meals  melatonin 3 milliGRAM(s) Oral at bedtime  multivitamin 1 Tablet(s) Oral daily  mupirocin 2% Ointment 1 Application(s) Topical two times a day  naloxegol 25 milliGRAM(s) Oral daily  pantoprazole    Tablet 40 milliGRAM(s) Oral before breakfast  piperacillin/tazobactam IVPB.. 3.375 Gram(s) IV Intermittent every 8 hours  polyethylene glycol 3350 17 Gram(s) Oral two times a day  povidone iodine 10% Solution 1 Application(s) Topical daily  QUEtiapine 25 milliGRAM(s) Oral at bedtime  sevelamer carbonate 800 milliGRAM(s) Oral three times a day with meals  silver nitrate Applicator 6 Application(s) Topical once  tamsulosin 0.4 milliGRAM(s) Oral at bedtime  zinc sulfate 220 milliGRAM(s) Oral daily    MEDICATIONS  (PRN):  albuterol/ipratropium for Nebulization 3 milliLiter(s) Nebulizer every 6 hours PRN Shortness of Breath and/or Wheezing  dextrose Oral Gel 15 Gram(s) Oral once PRN Blood Glucose LESS THAN 70 milliGRAM(s)/deciliter  oxyCODONE    IR 20 milliGRAM(s) Oral every 6 hours PRN Severe Pain (7 - 10)  oxyCODONE    IR 15 milliGRAM(s) Oral two times a day PRN Moderate Pain (4 - 6)  oxyCODONE    IR 10 milliGRAM(s) Oral two times a day PRN Mild Pain (1 - 3)            < from: Xray Hip 1 View, Left (05.03.25 @ 13:18) >    PROCEDURE DATE:  05/03/2025          INTERPRETATION:  Radiographs of the LEFT hip    CLINICAL INFORMATION:  Injury with   Pain.    TECHNIQUE:   AP and oblique views of the hip.    FINDINGS:   No prior exams are available for comparison.    No fracture or dislocation.  Femoral acetabular joint space radiographically intact.  No gross soft tissue abnormality.      IMPRESSION:    No acute radiographic osseous pathology..  Please review  pelvic CT report 5/1/2025    --- End of Report ---

## 2025-05-06 NOTE — PROGRESS NOTE ADULT - ASSESSMENT
39 year old male with PMH of morbid obesity, BEA on CPAP, pAFIB (not on AC), HTN, and BL papilledema attributed to pseudotumor cerebri; who initially presented to  on 2/24 with SOB and BL LE edema. Found to have URI with progressive SOB and multifocal PNA on imaging. His hospital course was complicated by worsening respiratory distress and increased 02 demands secondary to secretions (requiring multiple intubation attempts) and eventual MICU admission. While in MICU, he 02 requirements continued despite optimal vent management. Concern noted for progressive ARDS, unable to prone given morbid obesity, and ultimately cannulated for vvECMO on 2/25 and transferred to [facility] for further management on 2/26.  His hospital course at [facility] was significant for the following: diuretic and ABX management, s/p trache and PEG (placed on 3/7- PEG since removed), RCU admission, high grade fevers (secondary to panniculitis), progressively worsening sacral ulcer (likely osteomyelitis- s/p surgical debridement), BL foot wound (secondary to pressor use), neuropathy (secondary to critical illness polyneuropathy), ELLA (secondary to vancomycin toxicity and overdiuresis- since DCd- with improvement). Patient now admitted for a multidisciplinary rehab program. 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  (present on admission)   - Gait Instability, ADL impairments and Functional impairments: start Comprehensive Rehab Program of PT/OT  - 3 hours a day, 5 days a week   - PRN: Nebulizer QID     BEA on BIPAP  --via nasal canula qhs    #Sacral Osteomyelitis  - Zosyn TID - last dose evening of 5/11/25    #Paroxysmal AFIB  - Eliquis 5mg BID     #Calf Tenderness  - BL LE doppler negative     #L hip pain  -- L hip xray--no acute findings but with persisting pain, we will get CT left hip (unable to get MRI due to patient's body size being above MRI device capacity)     #HTN  - Amlodipine 10mg dialy     #Sleep/Mood  - Melatonin 3mg at HS   - Quetiapine 25mg at HS     #Skin  - LUQ surgical wound previous site of PEG tube and right groin- Clean wound and periwound skin with NS. Pat dry. Cover with small silicone foam with border. Change every other day or prn if soiled     Sacral/gluteal cleft to bilateral buttocks- Irrigate deep cavity and tunneling with Dakins solution 1/4 strength using peribottle or flushes, cleanse wound and satellite ulcerations to b/l buttocks with NS, Dry well. Apply Liquid barrier film to periwound skin. Apply TRIAD barrier paste to isolated ulcer overlying left buttock,  Apply Aquacel hydrofiber sheet to right buttock, pack sacral/gluteal cleft including deep tunnels with Dakins 1/4 strength moistened kerlix, leave at least 2" out at end to ensure full removal of packing with subsequent dressing changes. Cover entire area (sacrum and right buttock) with abdominal pad and tegaderm. Change twice a day and prn if soiled/compromised. Once dressing is in place and perineal care is provided, clean remaining skin with perineal spray, apply TRIAD moisture barrier cream to exposed skin every shift and prn.    Bilateral abdominal pannus, bilateral groin: Cleanse with luke-warm soap and water, dry well. Apply clotrimazole cream daily, followed by Interdry textile sheeting, under intertriginous folds leaving 2 inches exposed at ends to wick, remove to wash & dry affected area, then replace. Individual sheeting may be used for up to 5 days unless soiled. Inspect skin daily.     - L foot wound- betadine to followed by 4x4 gauze, ABD pad, and matilde daily  - R foot wound- mupirocin BID     -Continue to offload pressure; bariatric low airloss support surface, turn and position per protocol with use of fluidized positioning devices, continue incontinence and moisture care per protocol and use of single breathable incontinence pads, continue to offload heels with fluidized positioning devices/Ochoa-Lock positioning device (PS# [id number]). Continue use of bariatric seat cushion (people soft #[id number]) and limit sit time- no more than 2 consecutive hours at any given time.   - Pressure injury/Skin: OOB to Chair, PT/OT    - Ammonium lactate to BL LEs     #Neuropathy  - Cymbalta 60mg daily   - Gabapentin 1200mg TID     #Pain Mgmt   - Capsaicin cream to BL feet QID - Avoid use on damaged, broken, or irritated skin. Avoid occlusive dressing or heat   - Lidocaine patch to BL thighs   - PRN:; Oxycodone and Tylenol 650mg QID     Morbid obesity 408 lb  (MRI device capacity 400lb)  Will get CT left hip)     #GI/Bowel Mgmt   - Pantoprazole  - Bisacodyl 10mg at HS   - Miralax   - Naloxegal 25mg daily     #/Bladder Mgmt   #ELLA  #Urinary Retention  - Renvela 800mg TID  - Cr improving   - Bladder scan TID  - Flomax 0.4mg at HS     #FEN --Morbid obesity (BMI > 40).  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 CANTU's Liaison with Family/Providers:    5/5--D/w Plastics team, they did some bedside debridement of bleeding areas on sacral wound.,They recommended to continue VAC dressing     5/6--Girlfriend present at bedside, participated during review,   Discussed treatment plan with her, including plan for further imaging for left hip pain   -----------------------------------------------  OUTPATIENT/FOLLOW UP:    Your Primary Care Provider,   Phone: (   )    -  Fax: (   )    -  Follow Up Time: 1 week    [facility] [place],   [address]  Phone: [phone]  Fax: (   )    -  Follow Up Time:    Dr. Waterhouse  Podiatry  [phone]  Within 1 week   -----------------------------------------------    39 year old male with PMH of morbid obesity, BEA on CPAP, pAFIB (not on AC), HTN, and BL papilledema attributed to pseudotumor cerebri; who initially presented to  on 2/24 with SOB and BL LE edema. Found to have URI with progressive SOB and multifocal PNA on imaging. His hospital course was complicated by worsening respiratory distress and increased 02 demands secondary to secretions (requiring multiple intubation attempts) and eventual MICU admission. While in MICU, he 02 requirements continued despite optimal vent management. Concern noted for progressive ARDS, unable to prone given morbid obesity, and ultimately cannulated for vvECMO on 2/25 and transferred to [facility] for further management on 2/26.  His hospital course at [facility] was significant for the following: diuretic and ABX management, s/p trache and PEG (placed on 3/7- PEG since removed), RCU admission, high grade fevers (secondary to panniculitis), progressively worsening sacral ulcer (likely osteomyelitis- s/p surgical debridement), BL foot wound (secondary to pressor use), neuropathy (secondary to critical illness polyneuropathy), ELLA (secondary to vancomycin toxicity and overdiuresis- since DCd- with improvement). Patient now admitted for a multidisciplinary rehab program. 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  (present on admission)   - Gait Instability, ADL impairments and Functional impairments: start Comprehensive Rehab Program of PT/OT  - 3 hours a day, 5 days a week   - PRN: Nebulizer QID     BEA on BIPAP  --via nasal canula qhs    #Sacral Osteomyelitis  - Zosyn TID - last dose evening of 5/11/25    #Paroxysmal AFIB  - Eliquis 5mg BID     #Calf Tenderness  - BL LE doppler negative     #L hip pain  -- L hip xray--no acute findings but with persisting pain, we will get CT left hip (unable to get MRI due to patient's body size being above MRI device capacity)     #HTN  - Amlodipine 10mg dialy     #Sleep/Mood  - Melatonin 3mg at HS   - Quetiapine 25mg at HS     #Skin  - LUQ surgical wound previous site of PEG tube and right groin- Clean wound and periwound skin with NS. Pat dry. Cover with small silicone foam with border. Change every other day or prn if soiled     Sacral/gluteal cleft to bilateral buttocks- Irrigate deep cavity and tunneling with Dakins solution 1/4 strength using peribottle or flushes, cleanse wound and satellite ulcerations to b/l buttocks with NS, Dry well. Apply Liquid barrier film to periwound skin. Apply TRIAD barrier paste to isolated ulcer overlying left buttock,  Apply Aquacel hydrofiber sheet to right buttock, pack sacral/gluteal cleft including deep tunnels with Dakins 1/4 strength moistened kerlix, leave at least 2" out at end to ensure full removal of packing with subsequent dressing changes. Cover entire area (sacrum and right buttock) with abdominal pad and tegaderm. Change twice a day and prn if soiled/compromised. Once dressing is in place and perineal care is provided, clean remaining skin with perineal spray, apply TRIAD moisture barrier cream to exposed skin every shift and prn.    Bilateral abdominal pannus, bilateral groin: Cleanse with luke-warm soap and water, dry well. Apply clotrimazole cream daily, followed by Interdry textile sheeting, under intertriginous folds leaving 2 inches exposed at ends to wick, remove to wash & dry affected area, then replace. Individual sheeting may be used for up to 5 days unless soiled. Inspect skin daily.     - L foot wound- betadine to followed by 4x4 gauze, ABD pad, and matilde daily  - R foot wound- mupirocin BID     -Continue to offload pressure; bariatric low airloss support surface, turn and position per protocol with use of fluidized positioning devices, continue incontinence and moisture care per protocol and use of single breathable incontinence pads, continue to offload heels with fluidized positioning devices/Ochoa-Lock positioning device (PS# [id number]). Continue use of bariatric seat cushion (people soft #[id number]) and limit sit time- no more than 2 consecutive hours at any given time.   - Pressure injury/Skin: OOB to Chair, PT/OT    - Ammonium lactate to BL LEs     #Neuropathy  - Cymbalta 60mg daily   - Gabapentin 1200mg TID     #Pain Mgmt   - Capsaicin cream to BL feet QID - Avoid use on damaged, broken, or irritated skin. Avoid occlusive dressing or heat   - Lidocaine patch to BL thighs   - PRN:; Oxycodone and Tylenol 650mg QID     Morbid obesity 408 lb  (MRI device capacity 400lb)  Will get CT left hip)     #GI/Bowel Mgmt   - Pantoprazole  - Bisacodyl 10mg at HS   - Miralax   - Naloxegal 25mg daily     #/Bladder Mgmt   #ELLA  #Urinary Retention  - Renvela 800mg TID  - Cr improving   - Bladder scan TID  - Flomax 0.4mg at HS     #FEN --Morbid obesity (BMI > 40).  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 CANTU's Liaison with Family/Providers:    5/5--D/w Plastics team, they did some bedside debridement of bleeding areas on sacral wound.,They recommended to continue VAC dressing     5/6--Girlfriend present at bedside, participated during review,   Discussed treatment plan with her, including plan for further imaging for left hip pain   -----------------------------------------------  OUTPATIENT/FOLLOW UP:    Your Primary Care Provider,   Phone: (   )    -  Fax: (   )    -  Follow Up Time: 1 week    [facility] [place],   [address] [place] [address]  Phone: [phone]  Fax: (   )    -  Follow Up Time:    Dr. Waterhouse  Podiatry  [phone]  Within 1 week   -----------------------------------------------

## 2025-05-07 ENCOUNTER — TRANSCRIPTION ENCOUNTER (OUTPATIENT)
Age: 40
End: 2025-05-07

## 2025-05-07 PROCEDURE — 99233 SBSQ HOSP IP/OBS HIGH 50: CPT

## 2025-05-07 PROCEDURE — 73721 MRI JNT OF LWR EXTRE W/O DYE: CPT | Mod: 26,LT

## 2025-05-07 PROCEDURE — 99232 SBSQ HOSP IP/OBS MODERATE 35: CPT

## 2025-05-07 RX ADMIN — Medication 3 MILLIGRAM(S): at 22:26

## 2025-05-07 RX ADMIN — Medication 25 GRAM(S): at 22:37

## 2025-05-07 RX ADMIN — DULOXETINE 60 MILLIGRAM(S): 20 CAPSULE, DELAYED RELEASE ORAL at 11:33

## 2025-05-07 RX ADMIN — Medication 400 MILLIGRAM(S): at 17:18

## 2025-05-07 RX ADMIN — OXYCODONE HYDROCHLORIDE 20 MILLIGRAM(S): 30 TABLET ORAL at 17:18

## 2025-05-07 RX ADMIN — Medication 25 GRAM(S): at 13:16

## 2025-05-07 RX ADMIN — SEVELAMER HYDROCHLORIDE 800 MILLIGRAM(S): 800 TABLET ORAL at 22:26

## 2025-05-07 RX ADMIN — CELECOXIB 100 MILLIGRAM(S): 50 CAPSULE ORAL at 06:14

## 2025-05-07 RX ADMIN — OXYCODONE HYDROCHLORIDE 15 MILLIGRAM(S): 30 TABLET ORAL at 22:25

## 2025-05-07 RX ADMIN — Medication 25 GRAM(S): at 05:23

## 2025-05-07 RX ADMIN — Medication 40 MILLIGRAM(S): at 05:46

## 2025-05-07 RX ADMIN — LIDOCAINE HYDROCHLORIDE 1 PATCH: 20 JELLY TOPICAL at 23:05

## 2025-05-07 RX ADMIN — CELECOXIB 100 MILLIGRAM(S): 50 CAPSULE ORAL at 17:19

## 2025-05-07 RX ADMIN — TAMSULOSIN HYDROCHLORIDE 0.8 MILLIGRAM(S): 0.4 CAPSULE ORAL at 22:26

## 2025-05-07 RX ADMIN — LIDOCAINE HYDROCHLORIDE 1 PATCH: 20 JELLY TOPICAL at 11:33

## 2025-05-07 RX ADMIN — Medication 220 MILLIGRAM(S): at 11:34

## 2025-05-07 RX ADMIN — LIDOCAINE HYDROCHLORIDE 1 PATCH: 20 JELLY TOPICAL at 19:04

## 2025-05-07 RX ADMIN — CELECOXIB 100 MILLIGRAM(S): 50 CAPSULE ORAL at 17:22

## 2025-05-07 RX ADMIN — Medication 1 APPLICATION(S): at 05:40

## 2025-05-07 RX ADMIN — QUETIAPINE FUMARATE 25 MILLIGRAM(S): 25 TABLET ORAL at 22:26

## 2025-05-07 RX ADMIN — APIXABAN 5 MILLIGRAM(S): 2.5 TABLET, FILM COATED ORAL at 05:46

## 2025-05-07 RX ADMIN — OXYCODONE HYDROCHLORIDE 15 MILLIGRAM(S): 30 TABLET ORAL at 23:25

## 2025-05-07 RX ADMIN — OXYCODONE HYDROCHLORIDE 20 MILLIGRAM(S): 30 TABLET ORAL at 06:16

## 2025-05-07 RX ADMIN — GABAPENTIN 1200 MILLIGRAM(S): 400 CAPSULE ORAL at 13:12

## 2025-05-07 RX ADMIN — OXYCODONE HYDROCHLORIDE 20 MILLIGRAM(S): 30 TABLET ORAL at 18:18

## 2025-05-07 RX ADMIN — NALOXEGOL OXALATE 25 MILLIGRAM(S): 12.5 TABLET, FILM COATED ORAL at 11:34

## 2025-05-07 RX ADMIN — Medication 1 TABLET(S): at 11:33

## 2025-05-07 RX ADMIN — GABAPENTIN 1200 MILLIGRAM(S): 400 CAPSULE ORAL at 22:25

## 2025-05-07 RX ADMIN — AMLODIPINE BESYLATE 10 MILLIGRAM(S): 10 TABLET ORAL at 05:46

## 2025-05-07 RX ADMIN — Medication 400 MILLIGRAM(S): at 07:55

## 2025-05-07 RX ADMIN — Medication 400 MILLIGRAM(S): at 11:34

## 2025-05-07 RX ADMIN — SEVELAMER HYDROCHLORIDE 800 MILLIGRAM(S): 800 TABLET ORAL at 11:34

## 2025-05-07 RX ADMIN — CELECOXIB 100 MILLIGRAM(S): 50 CAPSULE ORAL at 05:46

## 2025-05-07 RX ADMIN — SEVELAMER HYDROCHLORIDE 800 MILLIGRAM(S): 800 TABLET ORAL at 07:55

## 2025-05-07 RX ADMIN — APIXABAN 5 MILLIGRAM(S): 2.5 TABLET, FILM COATED ORAL at 17:19

## 2025-05-07 RX ADMIN — OXYCODONE HYDROCHLORIDE 20 MILLIGRAM(S): 30 TABLET ORAL at 05:46

## 2025-05-07 RX ADMIN — Medication 500 MILLIGRAM(S): at 11:34

## 2025-05-07 RX ADMIN — GABAPENTIN 1200 MILLIGRAM(S): 400 CAPSULE ORAL at 07:04

## 2025-05-07 RX ADMIN — MUPIROCIN CALCIUM 1 APPLICATION(S): 20 CREAM TOPICAL at 05:40

## 2025-05-07 NOTE — PROGRESS NOTE ADULT - SUBJECTIVE AND OBJECTIVE BOX
Subjective  Patient seen and examined at bedside this AM.  Girlfriend at bedside.   Admits to sleeping well.  L hip pain with movement persists, unable to bear weight. Denies L hip pain at rest.   Denies urinary hesitancy, admits to being "pee shy" when many people are around.   Wound vac in place.     ROS--no acute pain, no fever or chest pain   B/l leg and sacral ulcers  Left hip pain with movement  LBM 5/6    Function --Engagement in therapy limited by left hip pain for second day  Tolerating stationary exercises in bed, pain precipitated by left leg movement     Vital Signs Last 24 Hrs  T(C): 36.9 (07 May 2025 07:56), Max: 37 (07 May 2025 07:05)  T(F): 98.5 (07 May 2025 07:56), Max: 98.6 (07 May 2025 07:05)  HR: 98 (07 May 2025 07:56) (96 - 107)  BP: 116/72 (07 May 2025 07:56) (113/75 - 134/84)  BP(mean): --  RR: 16 (07 May 2025 07:56) (16 - 16)  SpO2: 100% (07 May 2025 07:56) (98% - 100%)      EXAM   Gen -  Comfortable, at rest, but pain with left leg movement   HEENT - NCAT, EOMI, MMM  Neck - Supple, No limited ROM  Pulm - CTAB, No wheeze, No rhonchi, No crackles  Cardiovascular - RRR, S1S2, No murmurs  Abdomen - Soft, non tender, +BS  Extremities - chronic skin changes consistent with elephantiasis, midline present RUE  Edema reducing     Neuro-  AAO X 3, Speech is normal     Motor - UE 5/5                    LEFT    LE - HF 1/5, KE 2/5 limited by bed size and body habitus, DF 1/5, PF 5/5--left hip/leg ROM limited by pain                     RIGHT LE - HF 3/5, KE 2/5 limited by bed size and body habitus , DF 1/5, PF 5/5        Sensory - Decreased to light touch bilateral lower extremities      Coordination - impaired lower extremities   Psychiatric - Mood stable, Affect WNL    Skin:  R foot plantar aspect lateral/distal side 7cm x 5.5cm ischemic wound from pressors  RLE 4th digit 1x1.5cm ischemic induced wound from pressors  RLE 5th digit 3x2cm ischemic induced wound from pressors  L foot plantar aspect lateral/distal side 6x7cm ischemic induced wound from pressor usage  LLE 4th digit 2x1cm ischemic induced wound from pressors  LLE 5th digit 2.5x3cm ischemic induced wound from pressors   R groin skin tear 1x1.5cm  Prior PEG insertion site 0.5x1cm   Former trach site 1x0.5cm  Unstageable pressure wound cocyx 7cmx4.5cm w/ 2.5cm depth  R buttock pressure induced wound semi contiguous with coccyx wound calling unstageable given prior debridement 8.5x5.5cm  L buttock 1.0x0.5cm stage 3 pressure injury  R buttock skin tear 3.5cm x 0.5cm and 1.5cmx0.5cm    MMS--Mod to severe pain with left leg movement       RECENT LABS/IMAGING                        8.8    6.08  )-----------( 380      ( 05 May 2025 06:19 )             30.0     05-05    138  |  101  |  12  ----------------------------<  109[H]  3.4[L]   |  33[H]  |  1.12    Ca    9.4      05 May 2025 06:19    TPro  7.7  /  Alb  2.3[L]  /  TBili  0.3  /  DBili  x   /  AST  14  /  ALT  19  /  AlkPhos  57  05-05      Urinalysis Basic - ( 05 May 2025 06:19 )    Color: x / Appearance: x / SG: x / pH: x  Gluc: 109 mg/dL / Ketone: x  / Bili: x / Urobili: x   Blood: x / Protein: x / Nitrite: x   Leuk Esterase: x / RBC: x / WBC x   Sq Epi: x / Non Sq Epi: x / Bacteria: x      < from: Xray Hip 1 View, Left (05.03.25 @ 13:18) >  PROCEDURE DATE:  05/03/2025    INTERPRETATION:  Radiographs of the LEFT hip  No fracture or dislocation.  Femoral acetabular joint space radiographically intact.  No gross soft tissue abnormality.  IMPRESSION:    No acute radiographic osseous pathology..  Please review  pelvic CT report 5/1/2025    --- End of Report ---          MEDICATIONS  (STANDING):  amLODIPine   Tablet 10 milliGRAM(s) Oral daily  ammonium lactate 12% Lotion 1 Application(s) Topical two times a day  apixaban 5 milliGRAM(s) Oral two times a day  ascorbic acid 500 milliGRAM(s) Oral daily  bisacodyl 10 milliGRAM(s) Oral at bedtime  capsaicin 0.025% Cream 1 Application(s) Topical four times a day  celecoxib 100 milliGRAM(s) Oral two times a day  clotrimazole 1% Cream 1 Application(s) Topical <User Schedule>  Dakins Solution - 1/4 Strength 1 Application(s) Topical two times a day  dextrose 5%. 1000 milliLiter(s) (100 mL/Hr) IV Continuous <Continuous>  dextrose 5%. 1000 milliLiter(s) (50 mL/Hr) IV Continuous <Continuous>  dextrose 50% Injectable 25 Gram(s) IV Push once  dextrose 50% Injectable 12.5 Gram(s) IV Push once  dextrose 50% Injectable 25 Gram(s) IV Push once  DULoxetine 60 milliGRAM(s) Oral daily  gabapentin 1200 milliGRAM(s) Oral every 8 hours  glucagon  Injectable 1 milliGRAM(s) IntraMuscular once  influenza   Vaccine 0.5 milliLiter(s) IntraMuscular once  lidocaine   4% Patch 1 Patch Transdermal daily  lidocaine   4% Patch 1 Patch Transdermal daily  magnesium oxide 400 milliGRAM(s) Oral three times a day with meals  melatonin 3 milliGRAM(s) Oral at bedtime  multivitamin 1 Tablet(s) Oral daily  mupirocin 2% Ointment 1 Application(s) Topical two times a day  naloxegol 25 milliGRAM(s) Oral daily  pantoprazole    Tablet 40 milliGRAM(s) Oral before breakfast  piperacillin/tazobactam IVPB.. 3.375 Gram(s) IV Intermittent every 8 hours  polyethylene glycol 3350 17 Gram(s) Oral two times a day  povidone iodine 10% Solution 1 Application(s) Topical daily  QUEtiapine 25 milliGRAM(s) Oral at bedtime  sevelamer carbonate 800 milliGRAM(s) Oral three times a day with meals  silver nitrate Applicator 6 Application(s) Topical once  tamsulosin 0.8 milliGRAM(s) Oral at bedtime  zinc sulfate 220 milliGRAM(s) Oral daily    MEDICATIONS  (PRN):  albuterol/ipratropium for Nebulization 3 milliLiter(s) Nebulizer every 6 hours PRN Shortness of Breath and/or Wheezing  dextrose Oral Gel 15 Gram(s) Oral once PRN Blood Glucose LESS THAN 70 milliGRAM(s)/deciliter  oxyCODONE    IR 20 milliGRAM(s) Oral every 6 hours PRN Severe Pain (7 - 10)  oxyCODONE    IR 15 milliGRAM(s) Oral two times a day PRN Moderate Pain (4 - 6)  oxyCODONE    IR 10 milliGRAM(s) Oral two times a day PRN Mild Pain (1 - 3)

## 2025-05-07 NOTE — PROGRESS NOTE ADULT - SUBJECTIVE AND OBJECTIVE BOX
Called to evaluate the patient for right hip pain - while he stays on antibiotics. Patient is off the floor for MRI.

## 2025-05-07 NOTE — PROGRESS NOTE ADULT - SUBJECTIVE AND OBJECTIVE BOX
Medicine Progress Note    Patient is a 39y old  Male who presents with a chief complaint of Debility secondary to acute hypoxic respiratory failure (07 May 2025 12:02)      SUBJECTIVE / OVERNIGHT EVENTS:  no complaints except right hip pain. aggravates with movement and when being pulled up by hailey.  rehab is tring to find out MRI facility to accommodate patient's weight.     ROS:  denied fever/chills/CP/SOB/cough/palpitation/dizziness/abdominal pian/nausea/vomiting/diarrhoea/constipation/dysuria/leg or calf pain/headaches.all other ROS neg    MEDICATIONS  (STANDING):  amLODIPine   Tablet 10 milliGRAM(s) Oral daily  ammonium lactate 12% Lotion 1 Application(s) Topical two times a day  apixaban 5 milliGRAM(s) Oral two times a day  ascorbic acid 500 milliGRAM(s) Oral daily  bisacodyl 10 milliGRAM(s) Oral at bedtime  capsaicin 0.025% Cream 1 Application(s) Topical four times a day  celecoxib 100 milliGRAM(s) Oral two times a day  clotrimazole 1% Cream 1 Application(s) Topical <User Schedule>  Dakins Solution - 1/4 Strength 1 Application(s) Topical two times a day  dextrose 5%. 1000 milliLiter(s) (100 mL/Hr) IV Continuous <Continuous>  dextrose 5%. 1000 milliLiter(s) (50 mL/Hr) IV Continuous <Continuous>  dextrose 50% Injectable 25 Gram(s) IV Push once  dextrose 50% Injectable 12.5 Gram(s) IV Push once  dextrose 50% Injectable 25 Gram(s) IV Push once  DULoxetine 60 milliGRAM(s) Oral daily  gabapentin 1200 milliGRAM(s) Oral every 8 hours  glucagon  Injectable 1 milliGRAM(s) IntraMuscular once  influenza   Vaccine 0.5 milliLiter(s) IntraMuscular once  lidocaine   4% Patch 1 Patch Transdermal daily  lidocaine   4% Patch 1 Patch Transdermal daily  magnesium oxide 400 milliGRAM(s) Oral three times a day with meals  melatonin 3 milliGRAM(s) Oral at bedtime  multivitamin 1 Tablet(s) Oral daily  mupirocin 2% Ointment 1 Application(s) Topical two times a day  naloxegol 25 milliGRAM(s) Oral daily  pantoprazole    Tablet 40 milliGRAM(s) Oral before breakfast  piperacillin/tazobactam IVPB.. 3.375 Gram(s) IV Intermittent every 8 hours  polyethylene glycol 3350 17 Gram(s) Oral two times a day  povidone iodine 10% Solution 1 Application(s) Topical daily  QUEtiapine 25 milliGRAM(s) Oral at bedtime  sevelamer carbonate 800 milliGRAM(s) Oral three times a day with meals  silver nitrate Applicator 6 Application(s) Topical once  tamsulosin 0.8 milliGRAM(s) Oral at bedtime  zinc sulfate 220 milliGRAM(s) Oral daily    MEDICATIONS  (PRN):  albuterol/ipratropium for Nebulization 3 milliLiter(s) Nebulizer every 6 hours PRN Shortness of Breath and/or Wheezing  dextrose Oral Gel 15 Gram(s) Oral once PRN Blood Glucose LESS THAN 70 milliGRAM(s)/deciliter  oxyCODONE    IR 20 milliGRAM(s) Oral every 6 hours PRN Severe Pain (7 - 10)  oxyCODONE    IR 15 milliGRAM(s) Oral two times a day PRN Moderate Pain (4 - 6)  oxyCODONE    IR 10 milliGRAM(s) Oral two times a day PRN Mild Pain (1 - 3)    CAPILLARY BLOOD GLUCOSE        I&O's Summary    06 May 2025 07:01  -  07 May 2025 07:00  --------------------------------------------------------  IN: 0 mL / OUT: 1550 mL / NET: -1550 mL    07 May 2025 07:01  -  07 May 2025 13:40  --------------------------------------------------------  IN: 720 mL / OUT: 0 mL / NET: 720 mL        PHYSICAL EXAM:  Vital Signs Last 24 Hrs  T(C): 36.9 (07 May 2025 07:56), Max: 37 (07 May 2025 07:05)  T(F): 98.5 (07 May 2025 07:56), Max: 98.6 (07 May 2025 07:05)  HR: 98 (07 May 2025 07:56) (96 - 107)  BP: 116/72 (07 May 2025 07:56) (113/75 - 134/84)  BP(mean): --  RR: 16 (07 May 2025 07:56) (16 - 16)  SpO2: 100% (07 May 2025 07:56) (98% - 100%)    Parameters below as of 07 May 2025 07:05  Patient On (Oxygen Delivery Method): BiPAP/CPAP        GENERAL: Not in distress. Alert . morbidly obese  HEENT: clear conjuctiva, MMM. no pallor or icterus  CARDIOVASCULAR: RRR S1, S2. No murmur/rubs/gallop  LUNGS: BLAE+, no rales, no wheezing, no rhonchi.    ABDOMEN: ND. Soft,  NT, no guarding / rebound / rigidity. BS normoactive  EXTREMITIES: b/l LE edema. no leg or calf TP. no hip TP  SKIN: warm and dry.   PSYCHIATRIC: Calm.  No agitation.  CNS: AAO*3. moves limbs, follows commands. b.l LE weakness    LABS:      COVID-19 PCR: NotDetec (25 Apr 2025 18:31)  SARS-CoV-2: NotDetec (16 Mar 2025 16:35)  SARS-CoV-2: NotDetec (12 Mar 2025 07:19)  SARS-CoV-2: NotDetec (26 Feb 2025 02:27)  SARS-CoV-2: NotDetec (24 Feb 2025 18:19)      RADIOLOGY & ADDITIONAL TESTS:  Imaging from Last 24 Hours:    < from: CT Pelvis Bony Only No Cont (05.06.25 @ 10:58) >    IMPRESSION:  Again seen is a sacral decubitus wound which extends superficial as well   as involving the right gluteus musculature and the posterior L5 with   small amount of fluid and gas within the tract. Limited involvement on   this noncontrast CT. This likely corresponds to adjacent phlegmonous   changes.    < end of copied text >      Electrocardiogram/QTc Interval:    COORDINATION OF CARE:  Care Discussed with Consultants/Other Providers:

## 2025-05-07 NOTE — PROGRESS NOTE ADULT - ASSESSMENT
39 year old male with PMH of morbid obesity, BEA on CPAP, pAFIB (not on AC), HTN, and BL papilledema attributed to pseudotumor cerebri; who initially presented to  on 2/24 with SOB and BL LE edema. Found to have URI with progressive SOB and multifocal PNA on imaging. His hospital course was complicated by worsening respiratory distress and increased 02 demands secondary to secretions (requiring multiple intubation attempts) and eventual MICU admission. While in MICU, he 02 requirements continued despite optimal vent management. Concern noted for progressive ARDS, unable to prone given morbid obesity, and ultimately cannulated for vvECMO on 2/25 and transferred to [facility] for further management on 2/26.  His hospital course at [facility] was significant for the following: diuretic and ABX management, s/p trache and PEG (placed on 3/7- PEG since removed), RCU admission, high grade fevers (secondary to panniculitis), progressively worsening sacral ulcer (likely osteomyelitis- s/p surgical debridement), BL foot wound (secondary to pressor use), neuropathy (secondary to critical illness polyneuropathy), ELLA (secondary to vancomycin toxicity and overdiuresis- since DCd- with improvement). Patient now admitted for a multidisciplinary rehab program. 04-25-25 @ 15:19    * Continue VAC dressing for sacral wound and Plastics f/u  * Left hip pain/inability to bear weight - limiting therapy progress - no acute findings on XR or CT - await MRI   * PRAFO boots at HS     # Critical illness myopathy due to respiratory failure and sepsis  (present on admission)   - Gait Instability, ADL impairments and Functional impairments: start Comprehensive Rehab Program of PT/OT  - 3 hours a day, 5 days a week   - PRN: Nebulizer QID     BEA on BIPAP  --via nasal canula qhs    #Sacral Osteomyelitis  - Zosyn TID - last dose evening of 5/11/25    #Paroxysmal AFIB  - Eliquis 5mg BID     #Calf Tenderness  - BL LE doppler negative     #L hip pain  - limiting therapy progress   - no acute findings on XR or CT   - await MRI     #HTN  - Amlodipine 10mg dialy     #Sleep/Mood  - Melatonin 3mg at HS   - Quetiapine 25mg at HS     #Skin  - LUQ surgical wound previous site of PEG tube and right groin- Clean wound and periwound skin with NS. Pat dry. Cover with small silicone foam with border. Change every other day or prn if soiled     Sacral/gluteal cleft to bilateral buttocks- Irrigate deep cavity and tunneling with Dakins solution 1/4 strength using peribottle or flushes, cleanse wound and satellite ulcerations to b/l buttocks with NS, Dry well. Apply Liquid barrier film to periwound skin. Apply TRIAD barrier paste to isolated ulcer overlying left buttock,  Apply Aquacel hydrofiber sheet to right buttock, pack sacral/gluteal cleft including deep tunnels with Dakins 1/4 strength moistened kerlix, leave at least 2" out at end to ensure full removal of packing with subsequent dressing changes. Cover entire area (sacrum and right buttock) with abdominal pad and tegaderm. Change twice a day and prn if soiled/compromised. Once dressing is in place and perineal care is provided, clean remaining skin with perineal spray, apply TRIAD moisture barrier cream to exposed skin every shift and prn.    Bilateral abdominal pannus, bilateral groin: Cleanse with luke-warm soap and water, dry well. Apply clotrimazole cream daily, followed by Interdry textile sheeting, under intertriginous folds leaving 2 inches exposed at ends to wick, remove to wash & dry affected area, then replace. Individual sheeting may be used for up to 5 days unless soiled. Inspect skin daily.     - L foot wound- betadine to followed by 4x4 gauze, ABD pad, and matilde daily  - R foot wound- mupirocin BID     -Continue to offload pressure; bariatric low airloss support surface, turn and position per protocol with use of fluidized positioning devices, continue incontinence and moisture care per protocol and use of single breathable incontinence pads, continue to offload heels with fluidized positioning devices/Ochoa-Lock positioning device (PS# [id number]). Continue use of bariatric seat cushion (people soft #[id number]) and limit sit time- no more than 2 consecutive hours at any given time.   - Pressure injury/Skin: OOB to Chair, PT/OT    - Ammonium lactate to BL LEs     #Neuropathy  - Cymbalta 60mg daily   - Gabapentin 1200mg TID     #Pain Mgmt   - Capsaicin cream to BL feet QID - Avoid use on damaged, broken, or irritated skin. Avoid occlusive dressing or heat   - Lidocaine patch to BL thighs   - PRN:; Oxycodone and Tylenol 650mg QID     #Morbid obesity  - Most recent weight 394lbs (5/7)     #GI/Bowel Mgmt   - Pantoprazole  - Bisacodyl 10mg at HS   - Miralax   - Naloxegal 25mg daily     #/Bladder Mgmt   #ELLA  #Urinary Retention  - Renvela 800mg TID  - Cr improving   - Bladder scan TID  - Flomax 0.4mg at HS     #FEN --Morbid obesity (BMI > 40).  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 CANTU's Liaison with Family/Providers:    5/5--D/w Plastics team, they did some bedside debridement of bleeding areas on sacral wound.,They recommended to continue VAC dressing     5/6--Girlfriend present at bedside, participated during review,   Discussed treatment plan with her, including plan for further imaging for left hip pain   -----------------------------------------------  OUTPATIENT/FOLLOW UP:    Your Primary Care Provider,   Phone: (   )    -  Fax: (   )    -  Follow Up Time: 1 week    [facility] [place],   [address] [place] [address]  Phone: [phone]  Fax: (   )    -  Follow Up Time:    Dr. Waterhouse  Podiatry  [phone]  Within 1 week   -----------------------------------------------

## 2025-05-07 NOTE — PROGRESS NOTE ADULT - ASSESSMENT
39 year old male with PMH of morbid obesity, BEA, pAFIB (not on AC), HTN, and BL papilledema attributed to pseudotumor cerebri; who initially presented to  on 2/24 with SOB and BL LE edema. Found to have URI with progressive SOB and multifocal PNA on imaging. His hospital course was complicated by worsening respiratory distress and increased 02 demands secondary to secretions (requiring multiple intubation attempts) and eventual MICU admission. While in MICU, he 02 requirements continued despite optimal vent management. Concern noted for progressive ARDS, unable to prone given morbid obesity, and ultimately cannulated for vvECMO on 2/25 and transferred to [facility] for further management on 2/26. His hospital course at [facility] was significant for the following: diuretic and ABX management, s/p trach and PEG (placed on 3/7- PEG since removed), RCU admission, high grade fevers (secondary to panniculitis), progressively worsening sacral ulcer (likely osteomyelitis- s/p surgical debridement), BL foot wound (secondary to pressor use), neuropathy (secondary to critical illness polyneuropathy), ELLA (secondary to vancomycin toxicity and overdiuresis- since DCd- with improvement). Patient now admitted for a multidisciplinary rehab program. 04-25-25 @ 15:19    Abnormal CT A/P:   - 5/2: CT A/P: Question of pneumatosis in the cecum with no other evidence of bowel ischemia. Correlation with lactate levels and clinical exam would be helpful.  - Patient has no symptoms. Abdomen is benign. Tolerating PO  - 5/2: Surgery cx noted: no intervention. c/w PO. refer to bariatric surgery post-DC dr botello at [place] or Dr. Segovia at [place] [per patient request]  - 5/3 GI cx noted: no GI intervention planned. c/w PO    left hip pain  - f/u hip xray--> no fracture  - CT hip: Again seen is a sacral decubitus wound which extends superficial as well as involving the right gluteus musculature and the posterior L5 with small amount of fluid and gas within the tract. Limited involvement on this noncontrast CT. This likely corresponds to adjacent phlegmonous changes  - MRI pending. cannot get MRI here for weight. Rehab team is working with radiology to find out a place where patient can get MRI  - pain control, PT  per primary  - Rehab team has plan to call ortho if needed after MRI, plastic is following decub/wound  - ID follow up requested 5/7    #Possible Sacral Osteomyelitis  -Continue Zosyn TID - last dose evening of 5/11/25    #Debility Secondary to Acute Hypoxic Respiratory Failure/ARDS 2/2 PNA  #BEA (Not on CPAP)  #Critical Illness Polyneuropathy   -s/p VV ECMO, s/p diuresis, TTE/ROBRET with RV failure, s/p treatment for pneumonia  -Repeat Echo 3/11 showed EF 66 %. RV is not well visualized, enlarged RV cavity size a/ reduced RV systolic function. No significant valvular disease.  No echocardiographic evidence of pulmonary hypertension.  -s/p Trach (decannulated 3/28) and PEG (removed 4/15)   -Saturating well on RA  -Continue duoneb PRN   -BIPAP QHS (FiO2 40%, 18/10) via nasal mask   -Fall precaution  -Pain control and bowel regimen per rehab team  -Comprehensive rehab program with PT/OT/SLP  -Outpatient pulm follow up       #Fungemia 2/2 Panniculitis  -Blood culture 3/15 and 3/16 with candida albicans  -s/p course of antifungals   -Repeat cultures negative since 3/18    #Chronic AFIB  #Sinus Tachycardia  - Patient has intermittent tachycardia. Per patient, His HR amostly above 110 even when he is not sick. follows with cardio OP. not interested in rate control meds at this time.   - 5/3: EKG: NSR@94 bpm  - EKG 4/28 showed sinus tachycardia, no acute acute ST/T wave changes  - c/w Eliquis 5mg BID   - Not on AVN blocker   - TSH WNL 2/25  - Sinus tachycardia likely multifactorial including pain and deconditioning. Low suspicion for PE, no SOB/hypoxia and he is already on full AC      #RIJ and Bilateral LE DVT  -Duplex RUE 3/14 showed Acute, non-occlusive deep vein thrombosis noted within the right internal jugular vein. Superficial vein thrombosis noted within the right antecubital cephalic vein.  -Duplex LE 3/14 showed Acute, occlusive deep vein thromboses noted within the right and left peroneal veins at both mid calves. Age-indeterminate, occlusive deep vein thrombosis visualized within the left gastrocnemius vein at the proximal calf.  -Continue eliquis  -Repeat duplex 4/29 showed No evidence of deep venous thrombosis in either lower extremity.    #Normocytic Anemia   -Likely multifactorial  -H/H stable, no evidence of active bleed   -Monitor CBC     #HTN  -Continue Amlodipine  -Monitor BP     #Hypokalemia (Resolved)  -s/p repletion  -Monitor BMP    #Sleep/Mood  -Continue Melatonin 3mg at HS   -Continue Quetiapine 25mg at HS     #History of Pseudotumor Cerebri   -Outpatient follow up     #Stage IV Sacral Decub Ulcer  #Bilateral Buttocks Wounds  #Bilateral Foot Wounds  -5/2 CT A/P: An air and fluid containing collection in the right gluteus zurdo muscle in continuity with a subcutaneous sacral collection. No obvious skin defect to suggest a decubitus ulcer.  -Continue local wound care  - Wound vac per plastic  -MVI, vitamin C and zinc   - Off load pressure  - Plastic consult following    #ELLA (Improved)  -Baseline Cr appears to be ~0.8 range   -ELLA felt to be multifactorial in setting of cardiorenal w/ RVE, recurrent ELLA suspected due to vancomycin toxicity and diuresis   -Cr mild uptrend to 1.45 (4/28) -> 1.16 on 4/29 -> 1.03 on 5/1   -Continue to encourage oral hydration   -Continue Renvela 800mg TID, monitor phos level  -Low phos diet  -Monitor BMP    #Type 2 Diabetes  -HbA1C 6.7 on 2/25, Repeat HbA1C 4.9 on 4/28  -FS trend reviewed, has been within goal  -RAISS discontinued  -Monitor glucose on routine lab, controlled     #/Urinary Retention  -on flomax 5/1  -Monitor bladder scans     #GI PPx  -PPI    #DVT PPx  -On Eliquis    Discussed with  and  rehab team at [facility]  d/w Dr. Wheeler [ ID]. Dr. Hidalgo [ plastic]

## 2025-05-08 LAB
BASOPHILS # BLD AUTO: 0.01 K/UL — SIGNIFICANT CHANGE UP (ref 0–0.2)
BASOPHILS NFR BLD AUTO: 0.2 % — SIGNIFICANT CHANGE UP (ref 0–2)
EOSINOPHIL # BLD AUTO: 0.27 K/UL — SIGNIFICANT CHANGE UP (ref 0–0.5)
EOSINOPHIL NFR BLD AUTO: 5.3 % — SIGNIFICANT CHANGE UP (ref 0–6)
HCT VFR BLD CALC: 29.2 % — LOW (ref 39–50)
HGB BLD-MCNC: 8.6 G/DL — LOW (ref 13–17)
IMM GRANULOCYTES NFR BLD AUTO: 0.2 % — SIGNIFICANT CHANGE UP (ref 0–0.9)
LYMPHOCYTES # BLD AUTO: 1.47 K/UL — SIGNIFICANT CHANGE UP (ref 1–3.3)
LYMPHOCYTES # BLD AUTO: 28.7 % — SIGNIFICANT CHANGE UP (ref 13–44)
MCHC RBC-ENTMCNC: 26.5 PG — LOW (ref 27–34)
MCHC RBC-ENTMCNC: 29.5 G/DL — LOW (ref 32–36)
MCV RBC AUTO: 89.8 FL — SIGNIFICANT CHANGE UP (ref 80–100)
MONOCYTES # BLD AUTO: 0.65 K/UL — SIGNIFICANT CHANGE UP (ref 0–0.9)
MONOCYTES NFR BLD AUTO: 12.7 % — SIGNIFICANT CHANGE UP (ref 2–14)
NEUTROPHILS # BLD AUTO: 2.71 K/UL — SIGNIFICANT CHANGE UP (ref 1.8–7.4)
NEUTROPHILS NFR BLD AUTO: 52.9 % — SIGNIFICANT CHANGE UP (ref 43–77)
NRBC BLD AUTO-RTO: 0 /100 WBCS — SIGNIFICANT CHANGE UP (ref 0–0)
PLATELET # BLD AUTO: 247 K/UL — SIGNIFICANT CHANGE UP (ref 150–400)
RBC # BLD: 3.25 M/UL — LOW (ref 4.2–5.8)
RBC # FLD: 19.9 % — HIGH (ref 10.3–14.5)
WBC # BLD: 5.12 K/UL — SIGNIFICANT CHANGE UP (ref 3.8–10.5)
WBC # FLD AUTO: 5.12 K/UL — SIGNIFICANT CHANGE UP (ref 3.8–10.5)

## 2025-05-08 PROCEDURE — 99233 SBSQ HOSP IP/OBS HIGH 50: CPT

## 2025-05-08 PROCEDURE — 99232 SBSQ HOSP IP/OBS MODERATE 35: CPT

## 2025-05-08 RX ORDER — CELECOXIB 50 MG/1
200 CAPSULE ORAL EVERY 12 HOURS
Refills: 0 | Status: COMPLETED | OUTPATIENT
Start: 2025-05-08 | End: 2025-05-15

## 2025-05-08 RX ADMIN — GABAPENTIN 1200 MILLIGRAM(S): 400 CAPSULE ORAL at 06:24

## 2025-05-08 RX ADMIN — Medication 400 MILLIGRAM(S): at 08:42

## 2025-05-08 RX ADMIN — Medication 25 GRAM(S): at 14:46

## 2025-05-08 RX ADMIN — OXYCODONE HYDROCHLORIDE 20 MILLIGRAM(S): 30 TABLET ORAL at 22:40

## 2025-05-08 RX ADMIN — OXYCODONE HYDROCHLORIDE 20 MILLIGRAM(S): 30 TABLET ORAL at 23:40

## 2025-05-08 RX ADMIN — Medication 500 MILLIGRAM(S): at 17:10

## 2025-05-08 RX ADMIN — Medication 25 GRAM(S): at 06:13

## 2025-05-08 RX ADMIN — OXYCODONE HYDROCHLORIDE 20 MILLIGRAM(S): 30 TABLET ORAL at 12:02

## 2025-05-08 RX ADMIN — Medication 400 MILLIGRAM(S): at 17:09

## 2025-05-08 RX ADMIN — GABAPENTIN 1200 MILLIGRAM(S): 400 CAPSULE ORAL at 22:40

## 2025-05-08 RX ADMIN — CELECOXIB 200 MILLIGRAM(S): 50 CAPSULE ORAL at 17:08

## 2025-05-08 RX ADMIN — AMLODIPINE BESYLATE 10 MILLIGRAM(S): 10 TABLET ORAL at 06:24

## 2025-05-08 RX ADMIN — Medication 25 GRAM(S): at 22:41

## 2025-05-08 RX ADMIN — APIXABAN 5 MILLIGRAM(S): 2.5 TABLET, FILM COATED ORAL at 17:09

## 2025-05-08 RX ADMIN — QUETIAPINE FUMARATE 25 MILLIGRAM(S): 25 TABLET ORAL at 22:40

## 2025-05-08 RX ADMIN — CELECOXIB 100 MILLIGRAM(S): 50 CAPSULE ORAL at 06:24

## 2025-05-08 RX ADMIN — Medication 400 MILLIGRAM(S): at 12:03

## 2025-05-08 RX ADMIN — OXYCODONE HYDROCHLORIDE 20 MILLIGRAM(S): 30 TABLET ORAL at 13:02

## 2025-05-08 RX ADMIN — NALOXEGOL OXALATE 25 MILLIGRAM(S): 12.5 TABLET, FILM COATED ORAL at 12:03

## 2025-05-08 RX ADMIN — SEVELAMER HYDROCHLORIDE 800 MILLIGRAM(S): 800 TABLET ORAL at 08:42

## 2025-05-08 RX ADMIN — CELECOXIB 200 MILLIGRAM(S): 50 CAPSULE ORAL at 17:15

## 2025-05-08 RX ADMIN — APIXABAN 5 MILLIGRAM(S): 2.5 TABLET, FILM COATED ORAL at 06:24

## 2025-05-08 RX ADMIN — DULOXETINE 60 MILLIGRAM(S): 20 CAPSULE, DELAYED RELEASE ORAL at 12:04

## 2025-05-08 RX ADMIN — Medication 3 MILLIGRAM(S): at 22:40

## 2025-05-08 RX ADMIN — TAMSULOSIN HYDROCHLORIDE 0.8 MILLIGRAM(S): 0.4 CAPSULE ORAL at 22:41

## 2025-05-08 RX ADMIN — Medication 40 MILLIGRAM(S): at 06:24

## 2025-05-08 RX ADMIN — GABAPENTIN 1200 MILLIGRAM(S): 400 CAPSULE ORAL at 13:56

## 2025-05-08 RX ADMIN — SEVELAMER HYDROCHLORIDE 800 MILLIGRAM(S): 800 TABLET ORAL at 17:09

## 2025-05-08 RX ADMIN — SEVELAMER HYDROCHLORIDE 800 MILLIGRAM(S): 800 TABLET ORAL at 12:03

## 2025-05-08 RX ADMIN — Medication 220 MILLIGRAM(S): at 12:03

## 2025-05-08 RX ADMIN — Medication 1 TABLET(S): at 12:03

## 2025-05-08 NOTE — CONSULT NOTE ADULT - ASSESSMENT
39y male with PMH of AF (not on AC), HTN, BEA, morbid obesity, pseudotumor cerebri s/p LP in 2024, initially presented to [place] on 2/24, SOB, LE edema with URI symptoms and sick contacts, noted to have severe ARDS, intubated however still hypoxia, cannulated for VV ECMO on 2/25, transferred to [facility] on 2/25, treated for multifocal PNA, abdominal wall cellulitis, s/p trach placement by CT surgery on 3/7, ECMO decannulated on 3/7. Zosyn completed on 3/9, but had recurrent fevers & Zosyn resumed on 3/10/25. Blood cx isolated Candida albicans (sustained fungemia 3/15, 3/16 & 3/18/25). Unclear source for yeast in BCx. Sacral ulcer, with extensive tunneling, and although no OM reported on MRI, WBC scan findings were suggestive of OM. Zosyn & antifungals cont'd. Febrile to 103, WBC 16K as of 3/30/25.  Repeated cx neg. R foot necrotic toes 4/5 dry gangrene, L foot 1,2,4 partial dry gangrene, not infected per podiatry & no intervention recommended. S/p PEG removal and sacral ulcer debridement, cx grew providenciae, bacteroides. TTE w/o IE. He was treated with 4 wks of Caspofungin until 4/15/25. ID recommended 6 wks of Zosyn until 5/11 for sacral OM.  B/l foot wounds felt secondary to pressor use, neuropathy felt secondary to critical illness polyneuropathy, he had developed ELLA secondary to vancomycin toxicity and overdiuresis- since DCd- with improvement.   He was medically stabilized and sent to [facility] on 4/25/25.  He has been getting Zosyn as was advised by ID at [facility] & to be completed on 5/11.  Started reported excessive Right hip pain yesterday.   MRI as above; No OM reported.  NO support for breakthrough infection.     PLAN:    Continue Zosyn until 5/11/25 as was advised by ID at [facility] - Dr Owens.  Continue local wound of sacral decub as recommended by plastics.  Please call us with questions if status changes. Thank you

## 2025-05-08 NOTE — PROGRESS NOTE ADULT - ASSESSMENT
39 year old male with PMH of morbid obesity, BEA, pAFIB (not on AC), HTN, and BL papilledema attributed to pseudotumor cerebri; who initially presented to  on 2/24 with SOB and BL LE edema. Found to have URI with progressive SOB and multifocal PNA on imaging. His hospital course was complicated by worsening respiratory distress and increased 02 demands secondary to secretions (requiring multiple intubation attempts) and eventual MICU admission. While in MICU, he 02 requirements continued despite optimal vent management. Concern noted for progressive ARDS, unable to prone given morbid obesity, and ultimately cannulated for vvECMO on 2/25 and transferred to [facility] for further management on 2/26. His hospital course at [facility] was significant for the following: diuretic and ABX management, s/p trach and PEG (placed on 3/7- PEG since removed), RCU admission, high grade fevers (secondary to panniculitis), progressively worsening sacral ulcer (likely osteomyelitis- s/p surgical debridement), BL foot wound (secondary to pressor use), neuropathy (secondary to critical illness polyneuropathy), ELLA (secondary to vancomycin toxicity and overdiuresis- since DCd- with improvement). Patient now admitted for a multidisciplinary rehab program. 04-25-25 @ 15:19    Abnormal CT A/P:   - 5/2: CT A/P: Question of pneumatosis in the cecum with no other evidence of bowel ischemia. Correlation with lactate levels and clinical exam would be helpful.  - Patient has no symptoms. Abdomen is benign. Tolerating PO  - 5/2: Surgery cx noted: no intervention. c/w PO. refer to bariatric surgery post-DC dr botello at [place] or Dr. Segovia at [place] [per patient request]  - 5/3 GI cx noted: no GI intervention planned. c/w PO    left hip pain  - Improving  - on celebrex. patient to avoid if pain is tolerable as risk of bleeding is high with eliquis  - hip xray--> no fracture  - CT hip: Again seen is a sacral decubitus wound which extends superficial as well as involving the right gluteus musculature and the posterior L5 with small amount of fluid and gas within the tract. Limited involvement on this noncontrast CT. This likely corresponds to adjacent phlegmonous changes  - MRI left hip: Incompletely characterized sacral wound with associated ill-defined collection of fluid and soft tissue gas which extends to/involve the right gluteus zurdo muscle as at CT pelvis study from one day prior. No interval change in the high STIR signal within the bilateral gluteal and thigh musculature suggestive of a myositis as compared to prior MRI study. No fracture, osseous destruction or aggressive periosteal reaction. No osteomyelitis at the left hip. Edema-like signal along the right and left greater trochanteric regions, overall increased as compared to the prior MRI study from 4/11/2025. Findings are felt to be reactive in etiology..  - pain control, PT  per primary    #Possible Sacral Osteomyelitis  -Continue Zosyn TID - last dose evening of 5/11/25  - ID noted    #Stage IV Sacral Decub Ulcer  #Bilateral Buttocks Wounds  #Bilateral Foot Wounds  -5/2 CT A/P: An air and fluid containing collection in the right gluteus zurdo muscle in continuity with a subcutaneous sacral collection. No obvious skin defect to suggest a decubitus ulcer.  - 5/7: MRI left hip: Incompletely characterized sacral wound with associated ill-defined collection of fluid and soft tissue gas which extends to/involve the right gluteus zurdo muscle as at CT pelvis study from one day prior. No interval change in the high STIR signal within the bilateral gluteal and thigh musculature suggestive of a myositis as compared to prior MRI study. No fracture, osseous destruction or aggressive periosteal reaction. No osteomyelitis at the left hip. Edema-like signal along the right and left greater trochanteric regions, overall increased as compared to the prior MRI study from 4/11/2025. Findings are felt to be reactive in etiology..  -Continue local wound care  - Wound vac per plastic  -MVI, vitamin C and zinc   - Off load pressure  - Plastic consult following  - ID follow up noted and appreciated  - spoke to Dr. Hidalgo 5/7. continue current management    #Fungemia 2/2 Panniculitis  -Blood culture 3/15 and 3/16 with candida albicans  -s/p course of antifungals   -Repeat cultures negative since 3/18    #Debility Secondary to Acute Hypoxic Respiratory Failure/ARDS 2/2 PNA  #BEA (Not on CPAP)  #Critical Illness Polyneuropathy   -s/p VV ECMO, s/p diuresis, TTE/ROBERT with RV failure, s/p treatment for pneumonia  -Repeat Echo 3/11 showed EF 66 %. RV is not well visualized, enlarged RV cavity size a/ reduced RV systolic function. No significant valvular disease.  No echocardiographic evidence of pulmonary hypertension.  -s/p Trach (decannulated 3/28) and PEG (removed 4/15)   -Saturating well on RA  -Continue duoneb PRN   -BIPAP QHS (FiO2 40%, 18/10) via nasal mask   -Fall precaution  -Pain control and bowel regimen per rehab team  -Comprehensive rehab program with PT/OT/SLP  -Outpatient pulm follow up       #Chronic AFIB  #Sinus Tachycardia  - Patient has intermittent tachycardia. Per patient, His HR amostly above 110 even when he is not sick. follows with cardio OP. not interested in rate control meds at this time.   - 5/3: EKG: NSR@94 bpm  - EKG 4/28 showed sinus tachycardia, no acute acute ST/T wave changes  - c/w Eliquis 5mg BID   - Not on AVN blocker   - TSH WNL 2/25  - Sinus tachycardia likely multifactorial including pain and deconditioning. Low suspicion for PE, no SOB/hypoxia and he is already on full AC      #RIJ and Bilateral LE DVT  -Duplex RUE 3/14 showed Acute, non-occlusive deep vein thrombosis noted within the right internal jugular vein. Superficial vein thrombosis noted within the right antecubital cephalic vein.  -Duplex LE 3/14 showed Acute, occlusive deep vein thromboses noted within the right and left peroneal veins at both mid calves. Age-indeterminate, occlusive deep vein thrombosis visualized within the left gastrocnemius vein at the proximal calf.  -Continue eliquis  -Repeat duplex 4/29 showed No evidence of deep venous thrombosis in either lower extremity.    #Normocytic Anemia   -Likely multifactorial  -H/H stable, no evidence of active bleed   -Monitor CBC     #HTN  -Continue Amlodipine  -Monitor BP     #Hypokalemia (Resolved)  -s/p repletion  -Monitor BMP and Mg    #Sleep/Mood  -Continue Melatonin 3mg at HS   -Continue Quetiapine 25mg at HS     #History of Pseudotumor Cerebri   -Outpatient follow up     #ELLA (Improved)  -Baseline Cr appears to be ~0.8 range   -ELLA felt to be multifactorial in setting of cardiorenal w/ RVE, recurrent ELLA suspected due to vancomycin toxicity and diuresis   -Cr mild uptrend to 1.45 (4/28) -> 1.16 on 4/29 -> 1.03 on 5/1   -Continue to encourage oral hydration   -Continue Renvela 800mg TID, monitor phos level  -Low phos diet  -Monitor BMP    #Type 2 Diabetes  -HbA1C 6.7 on 2/25, Repeat HbA1C 4.9 on 4/28  -FS trend reviewed, has been within goal  -RAISS discontinued  -Monitor glucose on routine lab, controlled     #/Urinary Retention  -on flomax 5/1  -Monitor bladder scans     #GI PPx  -PPI    #DVT PPx  -On Eliquis    Discussed with Susanna MEHTA  and  rehab team at [facility]  d/w Dr. Wheeler [ ID].

## 2025-05-08 NOTE — CONSULT NOTE ADULT - SUBJECTIVE AND OBJECTIVE BOX
HPI:   Patient is a 39y male with PMH of AF (not on AC), HTN, BEA, morbid obesity, pseudotumor cerebri s/p LP in 2024, initially presented to [place] on 2/24, SOB, LE edema with URI symptoms and sick contacts, noted to have severe ARDS, intubated however still hypoxia, cannulated for VV ECMO on 2/25, transferred to [facility] on 2/25, treated for multifocal PNA, abdominal wall cellulitis, s/p trach placement by CT surgery on 3/7, ECMO decannulated on 3/7. Zosyn completed on 3/9, but had recurrent fevers & Zosyn resumed  Transferred to [facility] on 3/13, Bcx now w/ yeast.    #Sacral ulcer, with extensive tunneling, no OM on MRI, however c/f OM on WBC scan  #Candida albicans fungemia   #Fevers   #Multifocal PNA, abdominal wall cellulitis s/p Vanco/Zosyn  #ARDS, hypoxic respiratory failure requiring VV EMCO 2/2 multifocal PNA? s/p decannulation on 3/7    Overall, 40 y/o M w/ PMHx AF (not on AC), HTN, BEA, pseudotumor cerebri s/p LP in 2024 admitted to [facility] on 2/26 for ARDS, hypoxic respiratory failure requiring VV EMCO 2/2 multifocal PNA? s/p decannulation on 3/7, c/b abdominal wall cellulitis s/p Vancomycin/Zosyn, s/p trach on 3/7, in the setting of persistent fevers despite Zosyn, BCx now w/ yeast, Candida albicans   Unclear source for yeast in BCx, pt had all central lines removed on 3/7, not getting TPN, no abdominal pain on exam, PEG site well appearing.     CT A/P w/o clear abdominal source of fungemia, possibly 2/2 abdominal wall cellulitis? TTE w/o IE.  BCx 3/18 1/2 positive, 3/20 NGTD x 2.     Pt with worsening sepsis on 3/30, febrile to 103, WBC 16.   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   Pt with deep tunneled wound to left, no drainage, malodor per wound care.   CT A/P w/ b/l pulmonary opacities but improved, no sacral abscess   WBC scan with soft tissue infection R side of pelvis, intense activity in R iliac bone possible OM. Lateral R forefoot with soft tissue infection, possible OM?    REVIEW OF SYSTEMS:  All other review of systems negative (Comprehensive ROS)    PAST MEDICAL & SURGICAL HISTORY:  HTN (hypertension)  Atrial fibrillation - paroxysmal  Papilledema of both eyes  Chronic sinusitis  Morbid obesity      No significant past surgical history      Allergies  No Known Allergies    Antimicrobials Day #  : until 5/11/24  piperacillin/tazobactam IVPB.. 3.375 Gram(s) IV Intermittent every 8 hours    Other Medications:  albuterol/ipratropium for Nebulization 3 milliLiter(s) Nebulizer every 6 hours PRN  amLODIPine   Tablet 10 milliGRAM(s) Oral daily  ammonium lactate 12% Lotion 1 Application(s) Topical two times a day  apixaban 5 milliGRAM(s) Oral two times a day  ascorbic acid 500 milliGRAM(s) Oral daily  bisacodyl 10 milliGRAM(s) Oral at bedtime  capsaicin 0.025% Cream 1 Application(s) Topical four times a day  celecoxib 200 milliGRAM(s) Oral every 12 hours  clotrimazole 1% Cream 1 Application(s) Topical <User Schedule>  Dakins Solution - 1/4 Strength 1 Application(s) Topical two times a day  dextrose 5%. 1000 milliLiter(s) IV Continuous <Continuous>  dextrose 5%. 1000 milliLiter(s) IV Continuous <Continuous>  dextrose 50% Injectable 25 Gram(s) IV Push once  dextrose 50% Injectable 12.5 Gram(s) IV Push once  dextrose 50% Injectable 25 Gram(s) IV Push once  dextrose Oral Gel 15 Gram(s) Oral once PRN  DULoxetine 60 milliGRAM(s) Oral daily  gabapentin 1200 milliGRAM(s) Oral every 8 hours  glucagon  Injectable 1 milliGRAM(s) IntraMuscular once  influenza   Vaccine 0.5 milliLiter(s) IntraMuscular once  lidocaine   4% Patch 1 Patch Transdermal daily  lidocaine   4% Patch 1 Patch Transdermal daily  magnesium oxide 400 milliGRAM(s) Oral three times a day with meals  melatonin 3 milliGRAM(s) Oral at bedtime  multivitamin 1 Tablet(s) Oral daily  mupirocin 2% Ointment 1 Application(s) Topical two times a day  naloxegol 25 milliGRAM(s) Oral daily  oxyCODONE    IR 20 milliGRAM(s) Oral every 6 hours PRN  oxyCODONE    IR 15 milliGRAM(s) Oral two times a day PRN  oxyCODONE    IR 10 milliGRAM(s) Oral two times a day PRN  pantoprazole    Tablet 40 milliGRAM(s) Oral before breakfast  polyethylene glycol 3350 17 Gram(s) Oral two times a day  povidone iodine 10% Solution 1 Application(s) Topical daily  QUEtiapine 25 milliGRAM(s) Oral at bedtime  sevelamer carbonate 800 milliGRAM(s) Oral three times a day with meals  silver nitrate Applicator 6 Application(s) Topical once  tamsulosin 0.8 milliGRAM(s) Oral at bedtime  zinc sulfate 220 milliGRAM(s) Oral daily      FAMILY HISTORY:      SOCIAL HISTORY:  Smoking:     ETOH:     Drug Use:     Single     T(F): 98.4 (05-08-25 @ 07:29), Max: 98.5 (05-07-25 @ 22:14)  HR: 96 (05-08-25 @ 07:29)  BP: 123/72 (05-08-25 @ 07:29)  RR: 16 (05-08-25 @ 07:29)  SpO2: 95% (05-08-25 @ 07:29)  Wt(kg): --    PHYSICAL EXAM:  General: alert, no acute distress  Eyes:  anicteric, no conjunctival injection, no discharge  Neck: supple  Lungs: clear to auscultation  Heart: regular rate and rhythm; no murmur  Abdomen: soft, nondistended, nontender. Bowel sounds   Skin: no lesions  Extremities: no edema  Neurologic: alert, oriented, moves upper extremities    LAB RESULTS:                        8.6    5.12  )-----------( 247      ( 08 May 2025 06:58 )             29.2                 MICROBIOLOGY:  RECENT CULTURES:        RADIOLOGY REVIEWED:   HPI:   Patient is a 39y male with PMH of AF (not on AC), HTN, BEA, morbid obesity, pseudotumor cerebri s/p LP in 2024, initially presented to [place] on 2/24, SOB, LE edema with URI symptoms and sick contacts, noted to have severe ARDS, intubated however still hypoxia, cannulated for VV ECMO on 2/25, transferred to [facility] on 2/25, treated for multifocal PNA, abdominal wall cellulitis, s/p trach placement by CT surgery on 3/7, ECMO decannulated on 3/7. Zosyn completed on 3/9, but had recurrent fevers & Zosyn resumed. Blood cx isolated Candida albicans (sustained fungemia 3/15, 3/16 & 3/18/25). Unclear source for yeast in BCx. Sacral ulcer, with extensive tunneling, and although no OM reported on MRI, WBC scan findings were suggestive of OM. Zosyn & antifungals cont'd. Repeated blood cx from 3/20/25 onwards were sterile. Febrile to 103, WBC 16K as of 3/30/25.  Repeated cx neg. R foot necrotic toes 4/5 dry gangrene, L foot 1,2,4 partial dry gangrene, not infected per podiatry & no intervention recommended. S/p PEG removal and sacral ulcer debridement, cx grew providenciae, bacteroides. TTE w/o IE. He was treated with 4 wks of Caspofungin until 4/15/25. ID recommended 6 wks of Zosyn until 5/11 for sacral OM.  B/l foot wounds felt secondary to pressor use, neuropathy felt secondary to critical illness polyneuropathy, he had developed ELLA secondary to vancomycin toxicity and overdiuresis- since DCd- with improvement. He was medically stabilized and sent to [facility] on 4/25/25.    HE has been getting Zosyn as was advised by ID at [facility] & to be completed on 5/11. Started reported excessive Right hip pain yesterday. He was sent for MRI & ID was called.     REVIEW OF SYSTEMS:  All other review of systems negative (Comprehensive ROS). Reports no fevers or chills. Leg mvmt severely restricted since the prolonged hospitalization.      PAST MEDICAL & SURGICAL HISTORY:  HTN (hypertension)  Atrial fibrillation - paroxysmal  Papilledema of both eyes  Chronic sinusitis  Morbid obesity      No significant past surgical history      Allergies  No Known Allergies    Antimicrobials Day #  : until 5/11/24  piperacillin/tazobactam IVPB.. 3.375 Gram(s) IV Intermittent every 8 hours    Other Medications:  albuterol/ipratropium for Nebulization 3 milliLiter(s) Nebulizer every 6 hours PRN  amLODIPine   Tablet 10 milliGRAM(s) Oral daily  ammonium lactate 12% Lotion 1 Application(s) Topical two times a day  apixaban 5 milliGRAM(s) Oral two times a day  ascorbic acid 500 milliGRAM(s) Oral daily  bisacodyl 10 milliGRAM(s) Oral at bedtime  capsaicin 0.025% Cream 1 Application(s) Topical four times a day  celecoxib 200 milliGRAM(s) Oral every 12 hours  clotrimazole 1% Cream 1 Application(s) Topical <User Schedule>  Dakins Solution - 1/4 Strength 1 Application(s) Topical two times a day  dextrose 5%. 1000 milliLiter(s) IV Continuous <Continuous>  dextrose 5%. 1000 milliLiter(s) IV Continuous <Continuous>  dextrose 50% Injectable 25 Gram(s) IV Push once  dextrose 50% Injectable 12.5 Gram(s) IV Push once  dextrose 50% Injectable 25 Gram(s) IV Push once  dextrose Oral Gel 15 Gram(s) Oral once PRN  DULoxetine 60 milliGRAM(s) Oral daily  gabapentin 1200 milliGRAM(s) Oral every 8 hours  glucagon  Injectable 1 milliGRAM(s) IntraMuscular once  influenza   Vaccine 0.5 milliLiter(s) IntraMuscular once  lidocaine   4% Patch 1 Patch Transdermal daily  lidocaine   4% Patch 1 Patch Transdermal daily  magnesium oxide 400 milliGRAM(s) Oral three times a day with meals  melatonin 3 milliGRAM(s) Oral at bedtime  multivitamin 1 Tablet(s) Oral daily  mupirocin 2% Ointment 1 Application(s) Topical two times a day  naloxegol 25 milliGRAM(s) Oral daily  oxyCODONE    IR 20 milliGRAM(s) Oral every 6 hours PRN  oxyCODONE    IR 15 milliGRAM(s) Oral two times a day PRN  oxyCODONE    IR 10 milliGRAM(s) Oral two times a day PRN  pantoprazole    Tablet 40 milliGRAM(s) Oral before breakfast  polyethylene glycol 3350 17 Gram(s) Oral two times a day  povidone iodine 10% Solution 1 Application(s) Topical daily  QUEtiapine 25 milliGRAM(s) Oral at bedtime  sevelamer carbonate 800 milliGRAM(s) Oral three times a day with meals  silver nitrate Applicator 6 Application(s) Topical once  tamsulosin 0.8 milliGRAM(s) Oral at bedtime  zinc sulfate 220 milliGRAM(s) Oral daily      FAMILY HISTORY:      SOCIAL HISTORY:  Smoking:  yes   ETOH: No     Drug Use: No  Single     T(F): 98.4 (05-08-25 @ 07:29), Max: 98.5 (05-07-25 @ 22:14)  HR: 96 (05-08-25 @ 07:29)  BP: 123/72 (05-08-25 @ 07:29)  RR: 16 (05-08-25 @ 07:29)  SpO2: 95% (05-08-25 @ 07:29)  Wt(kg): --    PHYSICAL EXAM:  General: alert, no acute distress  Eyes:  anicteric, no conjunctival injection, no discharge  Neck: supple  Lungs: clear to auscultation  Heart: regular rate and rhythm; no murmur  Abdomen: soft, nondistended, nontender. Bowel sounds +  Skin: no lesions  Extremities: no edema  Neurologic: alert, oriented, moves upper extremities    LAB RESULTS:                        8.6    5.12  )-----------( 247      ( 08 May 2025 06:58 )             29.2       MICROBIOLOGY:  RECENT CULTURES:        RADIOLOGY REVIEWED:  < from: MR Hip No Cont, Left (05.07.25 @ 15:46) >  IMPRESSION:  Incompletely characterized sacral wound with associated ill-defined   collection of fluid and soft tissue gas which extends to/involve the   right gluteus zurdo muscle as at CT pelvis study from one day prior.    No interval change in the high STIR signal within the bilateral gluteal   and thigh musculature suggestive of a myositis as compared to prior MRI   study.    No fracture, osseous destruction or aggressive periosteal reaction. No   osteomyelitis at the left hip.    Edema-like signal along the right and left greater trochanteric regions,   overall increased as compared to the prior MRI study from 4/11/2025.   Findings are felt to be reactive in etiology..    --- End of Report ---    < end of copied text >

## 2025-05-08 NOTE — PROGRESS NOTE ADULT - ASSESSMENT
39 year old male with PMH of morbid obesity, BEA on CPAP, pAFIB (not on AC), HTN, and BL papilledema attributed to pseudotumor cerebri; who initially presented to  on 2/24 with SOB and BL LE edema. Found to have URI with progressive SOB and multifocal PNA on imaging. His hospital course was complicated by worsening respiratory distress and increased 02 demands secondary to secretions (requiring multiple intubation attempts) and eventual MICU admission. While in MICU, he 02 requirements continued despite optimal vent management. Concern noted for progressive ARDS, unable to prone given morbid obesity, and ultimately cannulated for vvECMO on 2/25 and transferred to [facility] for further management on 2/26.  His hospital course at [facility] was significant for the following: diuretic and ABX management, s/p trache and PEG (placed on 3/7- PEG since removed), RCU admission, high grade fevers (secondary to panniculitis), progressively worsening sacral ulcer (likely osteomyelitis- s/p surgical debridement), BL foot wound (secondary to pressor use), neuropathy (secondary to critical illness polyneuropathy), ELLA (secondary to vancomycin toxicity and overdiuresis- since DCd- with improvement). Patient now admitted for a multidisciplinary rehab program. 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 sepsis  (present on admission)   - Gait Instability, ADL impairments and Functional impairments: start Comprehensive Rehab Program of PT/OT  - 3 hours a day, 5 days a week   - PRN: Nebulizer QID     BEA on BIPAP  --via nasal canula qhs    #Sacral Osteomyelitis  - Zosyn TID - last dose evening of 5/11/25    #Paroxysmal AFIB  - Eliquis 5mg BID     #Calf Tenderness  - BL LE doppler negative     #L hip pain  - limiting therapy progress   - Celebrex 200mg BID   - no acute findings on XR or CT   - L hip MRI (5/7) no significant findings     #HTN  - Amlodipine 10mg dialy     #Sleep/Mood  - Melatonin 3mg at HS   - Quetiapine 25mg at HS     #Skin  - LUQ surgical wound previous site of PEG tube and right groin- Clean wound and periwound skin with NS. Pat dry. Cover with small silicone foam with border. Change every other day or prn if soiled     Sacral/gluteal cleft to bilateral buttocks- Irrigate deep cavity and tunneling with Dakins solution 1/4 strength using peribottle or flushes, cleanse wound and satellite ulcerations to b/l buttocks with NS, Dry well. Apply Liquid barrier film to periwound skin. Apply TRIAD barrier paste to isolated ulcer overlying left buttock,  Apply Aquacel hydrofiber sheet to right buttock, pack sacral/gluteal cleft including deep tunnels with Dakins 1/4 strength moistened kerlix, leave at least 2" out at end to ensure full removal of packing with subsequent dressing changes. Cover entire area (sacrum and right buttock) with abdominal pad and tegaderm. Change twice a day and prn if soiled/compromised. Once dressing is in place and perineal care is provided, clean remaining skin with perineal spray, apply TRIAD moisture barrier cream to exposed skin every shift and prn.    Bilateral abdominal pannus, bilateral groin: Cleanse with luke-warm soap and water, dry well. Apply clotrimazole cream daily, followed by Interdry textile sheeting, under intertriginous folds leaving 2 inches exposed at ends to wick, remove to wash & dry affected area, then replace. Individual sheeting may be used for up to 5 days unless soiled. Inspect skin daily.     - L foot wound- betadine to followed by 4x4 gauze, ABD pad, and matilde daily  - R foot wound- mupirocin BID     -Continue to offload pressure; bariatric low airloss support surface, turn and position per protocol with use of fluidized positioning devices, continue incontinence and moisture care per protocol and use of single breathable incontinence pads, continue to offload heels with fluidized positioning devices/Ochoa-Lock positioning device (PS# [id number]). Continue use of bariatric seat cushion (people soft #[id number]) and limit sit time- no more than 2 consecutive hours at any given time.   - Pressure injury/Skin: OOB to Chair, PT/OT    - Ammonium lactate to BL LEs     #Neuropathy  - Cymbalta 60mg daily   - Gabapentin 1200mg TID     #Pain Mgmt   - Capsaicin cream to BL feet QID - Avoid use on damaged, broken, or irritated skin. Avoid occlusive dressing or heat   - Lidocaine patch to BL thighs   - PRN:; Oxycodone and Tylenol 650mg QID     #Morbid obesity  - Most recent weight 394lbs (5/7)     #GI/Bowel Mgmt   - Pantoprazole  - Bisacodyl 10mg at HS   - Miralax   - Naloxegal 25mg daily     #/Bladder Mgmt   #ELLA  #Urinary Retention  - Renvela 800mg TID  - Cr improving   - Bladder scan TID  - Flomax 0.4mg at HS     #FEN --Morbid obesity (BMI > 40).  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 DYSONs Liaison with Family/Providers:    5/5--D/w Plastics team, they did some bedside debridement of bleeding areas on sacral wound.,They recommended to continue VAC dressing     5/6--Girlfriend present at bedside, participated during review,   Discussed treatment plan with her, including plan for further imaging for left hip pain   -----------------------------------------------  OUTPATIENT/FOLLOW UP:    Your Primary Care Provider,   Phone: (   )    -  Fax: (   )    -  Follow Up Time: 1 week    [facility],   [address] [place] [address]  Phone: [phone]  Fax: (   )    -  Follow Up Time:    Dr. Waterhouse  Podiatry  [phone]  Within 1 week   -----------------------------------------------    39 year old male with PMH of morbid obesity, BEA on CPAP, pAFIB (not on AC), HTN, and BL papilledema attributed to pseudotumor cerebri; who initially presented to  on 2/24 with SOB and BL LE edema. Found to have URI with progressive SOB and multifocal PNA on imaging. His hospital course was complicated by worsening respiratory distress and increased 02 demands secondary to secretions (requiring multiple intubation attempts) and eventual MICU admission. While in MICU, he 02 requirements continued despite optimal vent management. Concern noted for progressive ARDS, unable to prone given morbid obesity, and ultimately cannulated for vvECMO on 2/25 and transferred to [facility] for further management on 2/26.  His hospital course at [facility] was significant for the following: diuretic and ABX management, s/p trache and PEG (placed on 3/7- PEG since removed), RCU admission, high grade fevers (secondary to panniculitis), progressively worsening sacral ulcer (likely osteomyelitis- s/p surgical debridement), BL foot wound (secondary to pressor use), neuropathy (secondary to critical illness polyneuropathy), ELLA (secondary to vancomycin toxicity and overdiuresis- since DCd- with improvement). Patient now admitted for a multidisciplinary rehab program. 04-25-25 @ 15:19    * Continue VAC dressing for sacral wound and Plastics f/u  * Left hip pain/inability to bear weight - limiting therapy progress - no acute findings on XR or CT - L hip MRI --increasing edema b/l greater trochanter, and myositis b/l gluteal and thigh muscles, unchanged from previous study, no other pathology noted   * L hip pain - improved on Celebrex - increase Celebrex to 200mg BID   * PRAFO boots at HS     # Critical illness myopathy due to respiratory failure and sepsis  (present on admission)   - Gait Instability, ADL impairments and Functional impairments: start Comprehensive Rehab Program of PT/OT  - 3 hours a day, 5 days a week   - PRN: Nebulizer QID     BEA on BIPAP  --via nasal canula qhs    #Sacral Osteomyelitis  - Zosyn TID - last dose evening of 5/11/25    #Paroxysmal AFIB  - Eliquis 5mg BID     #Calf Tenderness  - BL LE doppler negative     #L hip pain  - limiting therapy progress   - Celebrex 200mg BID   - no acute findings on XR or CT   - L hip MRI (5/7) no significant findings     #HTN  - Amlodipine 10mg dialy     #Sleep/Mood  - Melatonin 3mg at HS   - Quetiapine 25mg at HS     #Skin  - LUQ surgical wound previous site of PEG tube and right groin- Clean wound and periwound skin with NS. Pat dry. Cover with small silicone foam with border. Change every other day or prn if soiled     Sacral/gluteal cleft to bilateral buttocks- Irrigate deep cavity and tunneling with Dakins solution 1/4 strength using peribottle or flushes, cleanse wound and satellite ulcerations to b/l buttocks with NS, Dry well. Apply Liquid barrier film to periwound skin. Apply TRIAD barrier paste to isolated ulcer overlying left buttock,  Apply Aquacel hydrofiber sheet to right buttock, pack sacral/gluteal cleft including deep tunnels with Dakins 1/4 strength moistened kerlix, leave at least 2" out at end to ensure full removal of packing with subsequent dressing changes. Cover entire area (sacrum and right buttock) with abdominal pad and tegaderm. Change twice a day and prn if soiled/compromised. Once dressing is in place and perineal care is provided, clean remaining skin with perineal spray, apply TRIAD moisture barrier cream to exposed skin every shift and prn.    Bilateral abdominal pannus, bilateral groin: Cleanse with luke-warm soap and water, dry well. Apply clotrimazole cream daily, followed by Interdry textile sheeting, under intertriginous folds leaving 2 inches exposed at ends to wick, remove to wash & dry affected area, then replace. Individual sheeting may be used for up to 5 days unless soiled. Inspect skin daily.     - L foot wound- betadine to followed by 4x4 gauze, ABD pad, and matilde daily  - R foot wound- mupirocin BID     -Continue to offload pressure; bariatric low airloss support surface, turn and position per protocol with use of fluidized positioning devices, continue incontinence and moisture care per protocol and use of single breathable incontinence pads, continue to offload heels with fluidized positioning devices/Ochoa-Lock positioning device (PS# [id number]). Continue use of bariatric seat cushion (people soft #[id number]) and limit sit time- no more than 2 consecutive hours at any given time.   - Pressure injury/Skin: OOB to Chair, PT/OT    - Ammonium lactate to BL LEs     #Neuropathy  - Cymbalta 60mg daily   - Gabapentin 1200mg TID     #Pain Mgmt   - Capsaicin cream to BL feet QID - Avoid use on damaged, broken, or irritated skin. Avoid occlusive dressing or heat   - Lidocaine patch to BL thighs   - PRN:; Oxycodone and Tylenol 650mg QID     #Morbid obesity  - Most recent weight 394lbs (5/7)     #GI/Bowel Mgmt   - Pantoprazole  - Bisacodyl 10mg at HS   - Miralax   - Naloxegal 25mg daily     #/Bladder Mgmt   #ELLA  #Urinary Retention  - Renvela 800mg TID  - Cr improving   - Bladder scan TID  - Flomax 0.4mg at HS     #FEN --Morbid obesity (BMI > 40).  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 CANTU's Liaison with Family/Providers:    5/5--D/w Plastics team, they did some bedside debridement of bleeding areas on sacral wound.,They recommended to continue VAC dressing     5/8--Girlfriend present at bedside, participated during review,   Discussed treatment plan including MRI findings and treatment plan of increasing dose of celebrex    -----------------------------------------------  OUTPATIENT/FOLLOW UP:    Your Primary Care Provider,   Phone: (   )    -  Fax: (   )    -  Follow Up Time: 1 week    [facility],   [address] [place] [address]  Phone: [phone]  Fax: (   )    -  Follow Up Time:    Dr. Waterhouse  Podiatry  [phone]  Within 1 week   -----------------------------------------------

## 2025-05-08 NOTE — PROGRESS NOTE ADULT - SUBJECTIVE AND OBJECTIVE BOX
Subjective  Patient seen and examined at bedside this AM.  Girlfriend at bedside.   Admits to sleeping well.  L hip pain improved with Celebrex. Denies L hip pain at rest.   Sacral wound vac in place.   Discussed MRI results, and increasing Celebrex.    ROS--no acute pain, no fever or chest pain   B/l leg and sacral ulcers  Left hip pain with movement  LBM 5/7    Function --Engagement in therapy limited by left hip pain for second day  Tolerating stationary exercises in bed, pain precipitated by left leg movement     Vital Signs Last 24 Hrs  T(C): 36.9 (08 May 2025 07:29), Max: 36.9 (07 May 2025 22:14)  T(F): 98.4 (08 May 2025 07:29), Max: 98.5 (07 May 2025 22:14)  HR: 96 (08 May 2025 07:29) (95 - 102)  BP: 123/72 (08 May 2025 07:29) (119/74 - 123/72)  BP(mean): --  RR: 16 (08 May 2025 07:29) (16 - 16)  SpO2: 95% (08 May 2025 07:29) (94% - 95%)    EXAM   Gen -  Comfortable, at rest, but pain with left leg movement   HEENT - NCAT, EOMI, MMM  Neck - Supple, No limited ROM  Pulm - CTAB, No wheeze, No rhonchi, No crackles  Cardiovascular - RRR, S1S2, No murmurs  Abdomen - Soft, non tender, +BS  Extremities - chronic skin changes consistent with elephantiasis, midline present RUE  Edema reducing     Neuro-  AAO X 3, Speech is normal     Motor - UE 5/5                    LEFT    LE - HF 1/5, KE 2/5 limited by bed size and body habitus, DF 1/5, PF 5/5--left hip/leg ROM limited by pain                     RIGHT LE - HF 3/5, KE 2/5 limited by bed size and body habitus , DF 1/5, PF 5/5        Sensory - Decreased to light touch bilateral lower extremities      Coordination - impaired lower extremities   Psychiatric - Mood stable, Affect WNL    Skin:  R foot plantar aspect lateral/distal side 7cm x 5.5cm ischemic wound from pressors  RLE 4th digit 1x1.5cm ischemic induced wound from pressors  RLE 5th digit 3x2cm ischemic induced wound from pressors  L foot plantar aspect lateral/distal side 6x7cm ischemic induced wound from pressor usage  LLE 4th digit 2x1cm ischemic induced wound from pressors  LLE 5th digit 2.5x3cm ischemic induced wound from pressors   R groin skin tear 1x1.5cm  Prior PEG insertion site 0.5x1cm   Former trach site 1x0.5cm  Unstageable pressure wound cocyx 7cmx4.5cm w/ 2.5cm depth  R buttock pressure induced wound semi contiguous with coccyx wound calling unstageable given prior debridement 8.5x5.5cm  L buttock 1.0x0.5cm stage 3 pressure injury  R buttock skin tear 3.5cm x 0.5cm and 1.5cmx0.5cm    MMS--Mod to severe pain with left leg movement     LABS:                        8.6    5.12  )-----------( 247      ( 08 May 2025 06:58 )             29.2                 < from: Xray Hip 1 View, Left (05.03.25 @ 13:18) >  PROCEDURE DATE:  05/03/2025    INTERPRETATION:  Radiographs of the LEFT hip  No fracture or dislocation.  Femoral acetabular joint space radiographically intact.  No gross soft tissue abnormality.  IMPRESSION:    No acute radiographic osseous pathology..  Please review  pelvic CT report 5/1/2025    --- End of Report ---        MEDICATIONS  (STANDING):  amLODIPine   Tablet 10 milliGRAM(s) Oral daily  ammonium lactate 12% Lotion 1 Application(s) Topical two times a day  apixaban 5 milliGRAM(s) Oral two times a day  ascorbic acid 500 milliGRAM(s) Oral daily  bisacodyl 10 milliGRAM(s) Oral at bedtime  capsaicin 0.025% Cream 1 Application(s) Topical four times a day  celecoxib 200 milliGRAM(s) Oral every 12 hours  clotrimazole 1% Cream 1 Application(s) Topical <User Schedule>  Dakins Solution - 1/4 Strength 1 Application(s) Topical two times a day  dextrose 5%. 1000 milliLiter(s) (100 mL/Hr) IV Continuous <Continuous>  dextrose 5%. 1000 milliLiter(s) (50 mL/Hr) IV Continuous <Continuous>  dextrose 50% Injectable 25 Gram(s) IV Push once  dextrose 50% Injectable 12.5 Gram(s) IV Push once  dextrose 50% Injectable 25 Gram(s) IV Push once  DULoxetine 60 milliGRAM(s) Oral daily  gabapentin 1200 milliGRAM(s) Oral every 8 hours  glucagon  Injectable 1 milliGRAM(s) IntraMuscular once  influenza   Vaccine 0.5 milliLiter(s) IntraMuscular once  lidocaine   4% Patch 1 Patch Transdermal daily  lidocaine   4% Patch 1 Patch Transdermal daily  magnesium oxide 400 milliGRAM(s) Oral three times a day with meals  melatonin 3 milliGRAM(s) Oral at bedtime  multivitamin 1 Tablet(s) Oral daily  mupirocin 2% Ointment 1 Application(s) Topical two times a day  naloxegol 25 milliGRAM(s) Oral daily  pantoprazole    Tablet 40 milliGRAM(s) Oral before breakfast  piperacillin/tazobactam IVPB.. 3.375 Gram(s) IV Intermittent every 8 hours  polyethylene glycol 3350 17 Gram(s) Oral two times a day  povidone iodine 10% Solution 1 Application(s) Topical daily  QUEtiapine 25 milliGRAM(s) Oral at bedtime  sevelamer carbonate 800 milliGRAM(s) Oral three times a day with meals  silver nitrate Applicator 6 Application(s) Topical once  tamsulosin 0.8 milliGRAM(s) Oral at bedtime  zinc sulfate 220 milliGRAM(s) Oral daily    MEDICATIONS  (PRN):  albuterol/ipratropium for Nebulization 3 milliLiter(s) Nebulizer every 6 hours PRN Shortness of Breath and/or Wheezing  dextrose Oral Gel 15 Gram(s) Oral once PRN Blood Glucose LESS THAN 70 milliGRAM(s)/deciliter  oxyCODONE    IR 20 milliGRAM(s) Oral every 6 hours PRN Severe Pain (7 - 10)  oxyCODONE    IR 15 milliGRAM(s) Oral two times a day PRN Moderate Pain (4 - 6)  oxyCODONE    IR 10 milliGRAM(s) Oral two times a day PRN Mild Pain (1 - 3)     Subjective  Patient seen and examined at bedside this AM.  Girlfriend at bedside.   Admits to sleeping well.  L hip pain improved with Celebrex. Denies L hip pain at rest.   Sacral wound vac in place.   Discussed MRI results, and increasing Celebrex.    ROS--no acute pain, no fever or chest pain   B/l leg and sacral ulcers  Left hip pain with movement  LBM 5/7    Function --Observed in therapy, improvement with engagement and better pain control noted   Pt rolled from side-to-side multi times throughout session. Pt able to roll to L  side Sup with no increase in pain. Pt able to roll to R side with min A 2/2 pain  in L hip. Ice at L hip to assist with pain.  Pt performed supine to sit and sitting at EOB mod A at b/l LEs only assist.  Pt from sitting at EOB with weight shifting from side to side to assist with  increasing WB onto  Pt from sit to supine min A at L LE, Pt able to roll and pull back up in bed min  A    Therapeutic Exercise  Pt performed supine UE, R LE, core therex:  -b/l LE hip IR 3x10  -b/l LE IR and hip flexion 3x10 with increased assist on l LE and decreased ROM  -Quad sets 3x10  -Hip abd with assist to remove friction 3x10 b/l  -Glute sets 3x10  -Hip ext flex 2x10 on R, 2x5 on L LE        Vital Signs Last 24 Hrs  T(C): 36.9 (08 May 2025 07:29), Max: 36.9 (07 May 2025 22:14)  T(F): 98.4 (08 May 2025 07:29), Max: 98.5 (07 May 2025 22:14)  HR: 96 (08 May 2025 07:29) (95 - 102)  BP: 123/72 (08 May 2025 07:29) (119/74 - 123/72)  RR: 16 (08 May 2025 07:29) (16 - 16)  SpO2: 95% (08 May 2025 07:29) (94% - 95%)    EXAM   Gen -  Comfortable, at rest, but pain with left leg movement   HEENT - NCAT, EOMI, MMM  Neck - Supple, No limited ROM  Pulm - CTAB, No wheeze, No rhonchi, No crackles  Cardiovascular - RRR, S1S2, No murmurs  Abdomen - Soft, non tender, +BS  Extremities - chronic skin changes consistent with elephantiasis, midline present RUE  Edema reducing     Neuro-  AAO X 3, Speech is normal     Motor - UE 5/5                    LEFT    LE - HF 1/5, KE 2/5 limited by bed size and body habitus, DF 1/5, PF 5/5--left hip/leg ROM limited by pain                     RIGHT LE - HF 3/5, KE 2/5 limited by bed size and body habitus , DF 1/5, PF 5/5        Sensory - Decreased to light touch bilateral lower extremities      Coordination - impaired lower extremities   Psychiatric - Mood stable, Affect WNL    Skin:  R foot plantar aspect lateral/distal side 7cm x 5.5cm ischemic wound from pressors  RLE 4th digit 1x1.5cm ischemic induced wound from pressors  RLE 5th digit 3x2cm ischemic induced wound from pressors  L foot plantar aspect lateral/distal side 6x7cm ischemic induced wound from pressor usage  LLE 4th digit 2x1cm ischemic induced wound from pressors  LLE 5th digit 2.5x3cm ischemic induced wound from pressors   R groin skin tear 1x1.5cm  Prior PEG insertion site 0.5x1cm   Former trach site 1x0.5cm  Unstageable pressure wound cocyx 7cmx4.5cm w/ 2.5cm depth  R buttock pressure induced wound semi contiguous with coccyx wound calling unstageable given prior debridement 8.5x5.5cm  L buttock 1.0x0.5cm stage 3 pressure injury  R buttock skin tear 3.5cm x 0.5cm and 1.5cmx0.5cm    MMS--Mod to severe pain with left leg movement     LABS:                        8.6    5.12  )-----------( 247      ( 08 May 2025 06:58 )             29.2         < from: Xray Hip 1 View, Left (05.03.25 @ 13:18) >  PROCEDURE DATE:  05/03/2025    INTERPRETATION:  Radiographs of the LEFT hip  No fracture or dislocation.  Femoral acetabular joint space radiographically intact.  No gross soft tissue abnormality.  IMPRESSION:    No acute radiographic osseous pathology..  Please review  pelvic CT report 5/1/2025      < from: MR Hip No Cont, Left (05.07.25 @ 15:46) >  PROCEDURE DATE:  05/07/2025          INTERPRETATION:  Exam Type: MR HIP LEFT  Exam Date and Time: 5/7/2025 3:46 PM  Indication: Left hip pain.  Comparison: MRI pelvis 4/11/2025. CT pelvis 5/6/2025.    TECHNIQUE:  MRI dedicated to the left hip in multiple planes and large field-of-view   of the bony pelvis in the coronal plane was performed on a 1.5 [facility] De [employer]   system using a standard non-contrast protocol.    FINDINGS:  Similar findings of a large sacral wound with associated ill-defined   collection of fluid and gas as compared to CT pelvis from one day prior.   This collection extends into/involvement of the right gluteal zurdo   musculature. The collection is only partially characterized given there   is only visualized on the large field-of-view coronal images of the   pelvis.    Similar findings of high STIR signal involving the bilateral gluteal   musculature as well as the bilateral thigh musculature, specifically the   rectus femoris and vastus lateralis muscles as compared to the prior   examination.    There is advanced atrophy and edema within the bilateral quadratus   femoris muscles, similar to prior. Paraspinal muscle edema-like signal is   also noted in unchanged as compared to prior.    There are no advanced productive changes at the bilateral hips. Bilateral   hip joint effusions are noted as at prior MRI study.    No fracture or dislocation. No osseous destruction or aggressive   periosteal reaction. There is edema-like signal along the right and left   greater trochanteric regions, overall increased as compared to the prior   MRI study from 4/11/2025. No low T1 signal identified at the left hip to   suggest osteomyelitis.      IMPRESSION:  Incompletely characterized sacral wound with associated ill-defined   collection of fluid and soft tissue gas which extends to/involve the   right gluteus zurdo muscle as at CT pelvis study from one day prior.    No interval change in the high STIR signal within the bilateral gluteal   and thigh musculature suggestive of a myositis as compared to prior MRI   study.    No fracture, osseous destruction or aggressive periosteal reaction. No   osteomyelitis at the left hip.    Edema-like signal along the right and left greater trochanteric regions,   overall increased as compared to the prior MRI study from 4/11/2025.   Findings are felt to be reactive in etiology..              MEDICATIONS  (STANDING):  amLODIPine   Tablet 10 milliGRAM(s) Oral daily  ammonium lactate 12% Lotion 1 Application(s) Topical two times a day  apixaban 5 milliGRAM(s) Oral two times a day  ascorbic acid 500 milliGRAM(s) Oral daily  bisacodyl 10 milliGRAM(s) Oral at bedtime  capsaicin 0.025% Cream 1 Application(s) Topical four times a day  celecoxib 200 milliGRAM(s) Oral every 12 hours  clotrimazole 1% Cream 1 Application(s) Topical <User Schedule>  Dakins Solution - 1/4 Strength 1 Application(s) Topical two times a day  dextrose 5%. 1000 milliLiter(s) (100 mL/Hr) IV Continuous <Continuous>  dextrose 5%. 1000 milliLiter(s) (50 mL/Hr) IV Continuous <Continuous>  dextrose 50% Injectable 25 Gram(s) IV Push once  dextrose 50% Injectable 12.5 Gram(s) IV Push once  dextrose 50% Injectable 25 Gram(s) IV Push once  DULoxetine 60 milliGRAM(s) Oral daily  gabapentin 1200 milliGRAM(s) Oral every 8 hours  glucagon  Injectable 1 milliGRAM(s) IntraMuscular once  influenza   Vaccine 0.5 milliLiter(s) IntraMuscular once  lidocaine   4% Patch 1 Patch Transdermal daily  lidocaine   4% Patch 1 Patch Transdermal daily  magnesium oxide 400 milliGRAM(s) Oral three times a day with meals  melatonin 3 milliGRAM(s) Oral at bedtime  multivitamin 1 Tablet(s) Oral daily  mupirocin 2% Ointment 1 Application(s) Topical two times a day  naloxegol 25 milliGRAM(s) Oral daily  pantoprazole    Tablet 40 milliGRAM(s) Oral before breakfast  piperacillin/tazobactam IVPB.. 3.375 Gram(s) IV Intermittent every 8 hours  polyethylene glycol 3350 17 Gram(s) Oral two times a day  povidone iodine 10% Solution 1 Application(s) Topical daily  QUEtiapine 25 milliGRAM(s) Oral at bedtime  sevelamer carbonate 800 milliGRAM(s) Oral three times a day with meals  silver nitrate Applicator 6 Application(s) Topical once  tamsulosin 0.8 milliGRAM(s) Oral at bedtime  zinc sulfate 220 milliGRAM(s) Oral daily    MEDICATIONS  (PRN):  albuterol/ipratropium for Nebulization 3 milliLiter(s) Nebulizer every 6 hours PRN Shortness of Breath and/or Wheezing  dextrose Oral Gel 15 Gram(s) Oral once PRN Blood Glucose LESS THAN 70 milliGRAM(s)/deciliter  oxyCODONE    IR 20 milliGRAM(s) Oral every 6 hours PRN Severe Pain (7 - 10)  oxyCODONE    IR 15 milliGRAM(s) Oral two times a day PRN Moderate Pain (4 - 6)  oxyCODONE    IR 10 milliGRAM(s) Oral two times a day PRN Mild Pain (1 - 3)

## 2025-05-08 NOTE — PROGRESS NOTE ADULT - SUBJECTIVE AND OBJECTIVE BOX
Medicine Progress Note    Patient is a 39y old  Male who presents with a chief complaint of Debility secondary to acute hypoxic respiratory failure (08 May 2025 11:33)      SUBJECTIVE / OVERNIGHT EVENTS:  left hip pain, controlled with celebrex.  no other symptoms  got MRI yesterday      ROS:  denied fever/chills/CP/SOB/cough/palpitation/dizziness/abdominal pian/nausea/vomiting/diarrhoea/constipation/dysuria/leg or calf pain/headaches.all other ROS neg    MEDICATIONS  (STANDING):  amLODIPine   Tablet 10 milliGRAM(s) Oral daily  ammonium lactate 12% Lotion 1 Application(s) Topical two times a day  apixaban 5 milliGRAM(s) Oral two times a day  ascorbic acid 500 milliGRAM(s) Oral daily  bisacodyl 10 milliGRAM(s) Oral at bedtime  capsaicin 0.025% Cream 1 Application(s) Topical four times a day  celecoxib 200 milliGRAM(s) Oral every 12 hours  clotrimazole 1% Cream 1 Application(s) Topical <User Schedule>  Dakins Solution - 1/4 Strength 1 Application(s) Topical two times a day  dextrose 5%. 1000 milliLiter(s) (100 mL/Hr) IV Continuous <Continuous>  dextrose 5%. 1000 milliLiter(s) (50 mL/Hr) IV Continuous <Continuous>  dextrose 50% Injectable 25 Gram(s) IV Push once  dextrose 50% Injectable 12.5 Gram(s) IV Push once  dextrose 50% Injectable 25 Gram(s) IV Push once  DULoxetine 60 milliGRAM(s) Oral daily  gabapentin 1200 milliGRAM(s) Oral every 8 hours  glucagon  Injectable 1 milliGRAM(s) IntraMuscular once  influenza   Vaccine 0.5 milliLiter(s) IntraMuscular once  lidocaine   4% Patch 1 Patch Transdermal daily  lidocaine   4% Patch 1 Patch Transdermal daily  magnesium oxide 400 milliGRAM(s) Oral three times a day with meals  melatonin 3 milliGRAM(s) Oral at bedtime  multivitamin 1 Tablet(s) Oral daily  mupirocin 2% Ointment 1 Application(s) Topical two times a day  naloxegol 25 milliGRAM(s) Oral daily  pantoprazole    Tablet 40 milliGRAM(s) Oral before breakfast  piperacillin/tazobactam IVPB.. 3.375 Gram(s) IV Intermittent every 8 hours  polyethylene glycol 3350 17 Gram(s) Oral two times a day  povidone iodine 10% Solution 1 Application(s) Topical daily  QUEtiapine 25 milliGRAM(s) Oral at bedtime  sevelamer carbonate 800 milliGRAM(s) Oral three times a day with meals  silver nitrate Applicator 6 Application(s) Topical once  tamsulosin 0.8 milliGRAM(s) Oral at bedtime  zinc sulfate 220 milliGRAM(s) Oral daily    MEDICATIONS  (PRN):  albuterol/ipratropium for Nebulization 3 milliLiter(s) Nebulizer every 6 hours PRN Shortness of Breath and/or Wheezing  dextrose Oral Gel 15 Gram(s) Oral once PRN Blood Glucose LESS THAN 70 milliGRAM(s)/deciliter  oxyCODONE    IR 20 milliGRAM(s) Oral every 6 hours PRN Severe Pain (7 - 10)  oxyCODONE    IR 15 milliGRAM(s) Oral two times a day PRN Moderate Pain (4 - 6)  oxyCODONE    IR 10 milliGRAM(s) Oral two times a day PRN Mild Pain (1 - 3)    CAPILLARY BLOOD GLUCOSE        I&O's Summary    07 May 2025 07:01  -  08 May 2025 07:00  --------------------------------------------------------  IN: 720 mL / OUT: 0 mL / NET: 720 mL        PHYSICAL EXAM:  Vital Signs Last 24 Hrs  T(C): 36.9 (08 May 2025 07:29), Max: 36.9 (07 May 2025 22:14)  T(F): 98.4 (08 May 2025 07:29), Max: 98.5 (07 May 2025 22:14)  HR: 96 (08 May 2025 07:29) (95 - 102)  BP: 123/72 (08 May 2025 07:29) (119/74 - 123/72)  BP(mean): --  RR: 16 (08 May 2025 07:29) (16 - 16)  SpO2: 95% (08 May 2025 07:29) (94% - 95%)    Parameters below as of 08 May 2025 07:29  Patient On (Oxygen Delivery Method): room air    GENERAL: Not in distress. Alert . morbidly obese  HEENT: clear conjuctiva, MMM. no pallor or icterus  CARDIOVASCULAR: RRR S1, S2. No murmur/rubs/gallop  LUNGS: BLAE+, no rales, no wheezing, no rhonchi.    ABDOMEN: ND. Soft,  NT, no guarding / rebound / rigidity. BS normoactive  EXTREMITIES: b/l LE edema. no leg or calf TP. no hip TP  SKIN: warm and dry. b/l feet dressing dry. wound vac  PSYCHIATRIC: Calm.  No agitation.  CNS: AAO*3. moves limbs, follows commands. b.l LE weakness    LABS:                        8.6    5.12  )-----------( 247      ( 08 May 2025 06:58 )             29.2         < from: MR Hip No Cont, Left (05.07.25 @ 15:46) >    IMPRESSION:  Incompletely characterized sacral wound with associated ill-defined   collection of fluid and soft tissue gas which extends to/involve the   right gluteus zurdo muscle as at CT pelvis study from one day prior.    No interval change in the high STIR signal within the bilateral gluteal   and thigh musculature suggestive of a myositis as compared to prior MRI   study.    No fracture, osseous destruction or aggressive periosteal reaction. No   osteomyelitis at the left hip.    Edema-like signal along the right and left greater trochanteric regions,   overall increased as compared to the prior MRI study from 4/11/2025.   Findings are felt to be reactive in etiology..    < end of copied text >              COVID-19 PCR: NotDetec (25 Apr 2025 18:31)  SARS-CoV-2: NotDetec (16 Mar 2025 16:35)  SARS-CoV-2: NotDetec (12 Mar 2025 07:19)  SARS-CoV-2: NotDetec (26 Feb 2025 02:27)  SARS-CoV-2: NotDetec (24 Feb 2025 18:19)      RADIOLOGY & ADDITIONAL TESTS:  Imaging from Last 24 Hours:    Electrocardiogram/QTc Interval:    COORDINATION OF CARE:  Care Discussed with Consultants/Other Providers:

## 2025-05-08 NOTE — CONSULT NOTE ADULT - CONSULT REASON
"pneumatosis"
Fever
Complicated sacral pressure ulcer. Recent CTAP with possible pneumatosis of cecum. Appreciate eval and recs.
Medical Management
sacral wound vac therapy

## 2025-05-08 NOTE — CONSULT NOTE ADULT - REASON FOR ADMISSION
Debility secondary to acute hypoxic respiratory failure

## 2025-05-09 LAB
ALBUMIN SERPL ELPH-MCNC: 2.3 G/DL — LOW (ref 3.3–5)
ALP SERPL-CCNC: 55 U/L — SIGNIFICANT CHANGE UP (ref 40–120)
ALT FLD-CCNC: 17 U/L — SIGNIFICANT CHANGE UP (ref 10–45)
ANION GAP SERPL CALC-SCNC: 5 MMOL/L — SIGNIFICANT CHANGE UP (ref 5–17)
AST SERPL-CCNC: 17 U/L — SIGNIFICANT CHANGE UP (ref 10–40)
BILIRUB SERPL-MCNC: 0.3 MG/DL — SIGNIFICANT CHANGE UP (ref 0.2–1.2)
BUN SERPL-MCNC: 12 MG/DL — SIGNIFICANT CHANGE UP (ref 7–23)
CALCIUM SERPL-MCNC: 9.4 MG/DL — SIGNIFICANT CHANGE UP (ref 8.4–10.5)
CHLORIDE SERPL-SCNC: 103 MMOL/L — SIGNIFICANT CHANGE UP (ref 96–108)
CO2 SERPL-SCNC: 32 MMOL/L — HIGH (ref 22–31)
CREAT SERPL-MCNC: 1.15 MG/DL — SIGNIFICANT CHANGE UP (ref 0.5–1.3)
EGFR: 84 ML/MIN/1.73M2 — SIGNIFICANT CHANGE UP
EGFR: 84 ML/MIN/1.73M2 — SIGNIFICANT CHANGE UP
GLUCOSE SERPL-MCNC: 100 MG/DL — HIGH (ref 70–99)
MAGNESIUM SERPL-MCNC: 1.7 MG/DL — SIGNIFICANT CHANGE UP (ref 1.6–2.6)
POTASSIUM SERPL-MCNC: 3.6 MMOL/L — SIGNIFICANT CHANGE UP (ref 3.5–5.3)
POTASSIUM SERPL-SCNC: 3.6 MMOL/L — SIGNIFICANT CHANGE UP (ref 3.5–5.3)
PROT SERPL-MCNC: 7.4 G/DL — SIGNIFICANT CHANGE UP (ref 6–8.3)
SODIUM SERPL-SCNC: 140 MMOL/L — SIGNIFICANT CHANGE UP (ref 135–145)

## 2025-05-09 PROCEDURE — 99232 SBSQ HOSP IP/OBS MODERATE 35: CPT

## 2025-05-09 RX ORDER — OXYCODONE HYDROCHLORIDE 30 MG/1
10 TABLET ORAL
Refills: 0 | Status: DISCONTINUED | OUTPATIENT
Start: 2025-05-09 | End: 2025-05-16

## 2025-05-09 RX ORDER — MELATONIN 5 MG
9 TABLET ORAL AT BEDTIME
Refills: 0 | Status: DISCONTINUED | OUTPATIENT
Start: 2025-05-09 | End: 2025-06-13

## 2025-05-09 RX ORDER — OXYCODONE HYDROCHLORIDE 30 MG/1
15 TABLET ORAL
Refills: 0 | Status: DISCONTINUED | OUTPATIENT
Start: 2025-05-09 | End: 2025-05-16

## 2025-05-09 RX ORDER — OXYCODONE HYDROCHLORIDE 30 MG/1
20 TABLET ORAL EVERY 6 HOURS
Refills: 0 | Status: DISCONTINUED | OUTPATIENT
Start: 2025-05-09 | End: 2025-05-16

## 2025-05-09 RX ADMIN — DULOXETINE 60 MILLIGRAM(S): 20 CAPSULE, DELAYED RELEASE ORAL at 11:51

## 2025-05-09 RX ADMIN — OXYCODONE HYDROCHLORIDE 20 MILLIGRAM(S): 30 TABLET ORAL at 23:14

## 2025-05-09 RX ADMIN — OXYCODONE HYDROCHLORIDE 20 MILLIGRAM(S): 30 TABLET ORAL at 09:18

## 2025-05-09 RX ADMIN — Medication 25 GRAM(S): at 14:54

## 2025-05-09 RX ADMIN — OXYCODONE HYDROCHLORIDE 20 MILLIGRAM(S): 30 TABLET ORAL at 08:22

## 2025-05-09 RX ADMIN — CELECOXIB 200 MILLIGRAM(S): 50 CAPSULE ORAL at 06:19

## 2025-05-09 RX ADMIN — Medication 25 GRAM(S): at 06:15

## 2025-05-09 RX ADMIN — CLOTRIMAZOLE 1 APPLICATION(S): 1 CREAM TOPICAL at 11:52

## 2025-05-09 RX ADMIN — Medication 25 GRAM(S): at 21:38

## 2025-05-09 RX ADMIN — Medication 40 MILLIGRAM(S): at 06:19

## 2025-05-09 RX ADMIN — Medication 400 MILLIGRAM(S): at 08:22

## 2025-05-09 RX ADMIN — TAMSULOSIN HYDROCHLORIDE 0.8 MILLIGRAM(S): 0.4 CAPSULE ORAL at 21:41

## 2025-05-09 RX ADMIN — Medication 500 MILLIGRAM(S): at 11:50

## 2025-05-09 RX ADMIN — Medication 9 MILLIGRAM(S): at 21:40

## 2025-05-09 RX ADMIN — QUETIAPINE FUMARATE 25 MILLIGRAM(S): 25 TABLET ORAL at 21:40

## 2025-05-09 RX ADMIN — GABAPENTIN 1200 MILLIGRAM(S): 400 CAPSULE ORAL at 21:40

## 2025-05-09 RX ADMIN — AMLODIPINE BESYLATE 10 MILLIGRAM(S): 10 TABLET ORAL at 06:18

## 2025-05-09 RX ADMIN — MUPIROCIN CALCIUM 1 APPLICATION(S): 20 CREAM TOPICAL at 17:34

## 2025-05-09 RX ADMIN — NALOXEGOL OXALATE 25 MILLIGRAM(S): 12.5 TABLET, FILM COATED ORAL at 11:51

## 2025-05-09 RX ADMIN — APIXABAN 5 MILLIGRAM(S): 2.5 TABLET, FILM COATED ORAL at 18:06

## 2025-05-09 RX ADMIN — SEVELAMER HYDROCHLORIDE 800 MILLIGRAM(S): 800 TABLET ORAL at 18:06

## 2025-05-09 RX ADMIN — SODIUM HYPOCHLORITE 1 APPLICATION(S): 0.12 SOLUTION TOPICAL at 17:33

## 2025-05-09 RX ADMIN — Medication 400 MILLIGRAM(S): at 11:51

## 2025-05-09 RX ADMIN — OXYCODONE HYDROCHLORIDE 20 MILLIGRAM(S): 30 TABLET ORAL at 16:13

## 2025-05-09 RX ADMIN — SEVELAMER HYDROCHLORIDE 800 MILLIGRAM(S): 800 TABLET ORAL at 08:22

## 2025-05-09 RX ADMIN — APIXABAN 5 MILLIGRAM(S): 2.5 TABLET, FILM COATED ORAL at 06:19

## 2025-05-09 RX ADMIN — SEVELAMER HYDROCHLORIDE 800 MILLIGRAM(S): 800 TABLET ORAL at 11:51

## 2025-05-09 RX ADMIN — GABAPENTIN 1200 MILLIGRAM(S): 400 CAPSULE ORAL at 14:41

## 2025-05-09 RX ADMIN — OXYCODONE HYDROCHLORIDE 20 MILLIGRAM(S): 30 TABLET ORAL at 22:29

## 2025-05-09 RX ADMIN — Medication 1 APPLICATION(S): at 18:07

## 2025-05-09 RX ADMIN — Medication 400 MILLIGRAM(S): at 18:07

## 2025-05-09 RX ADMIN — Medication 220 MILLIGRAM(S): at 11:51

## 2025-05-09 RX ADMIN — GABAPENTIN 1200 MILLIGRAM(S): 400 CAPSULE ORAL at 06:18

## 2025-05-09 RX ADMIN — Medication 1 TABLET(S): at 11:51

## 2025-05-09 RX ADMIN — CELECOXIB 200 MILLIGRAM(S): 50 CAPSULE ORAL at 18:06

## 2025-05-09 RX ADMIN — Medication 1 APPLICATION(S): at 11:52

## 2025-05-09 NOTE — PROGRESS NOTE ADULT - ASSESSMENT
39 year old male with PMH of morbid obesity, BEA on CPAP, pAFIB (not on AC), HTN, and BL papilledema attributed to pseudotumor cerebri; who initially presented to  on 2/24 with SOB and BL LE edema. Found to have URI with progressive SOB and multifocal PNA on imaging. His hospital course was complicated by worsening respiratory distress and increased 02 demands secondary to secretions (requiring multiple intubation attempts) and eventual MICU admission. While in MICU, he 02 requirements continued despite optimal vent management. Concern noted for progressive ARDS, unable to prone given morbid obesity, and ultimately cannulated for vvECMO on 2/25 and transferred to [facility] for further management on 2/26.  His hospital course at [facility] was significant for the following: diuretic and ABX management, s/p trache and PEG (placed on 3/7- PEG since removed), RCU admission, high grade fevers (secondary to panniculitis), progressively worsening sacral ulcer (likely osteomyelitis- s/p surgical debridement), BL foot wound (secondary to pressor use), neuropathy (secondary to critical illness polyneuropathy), ELLA (secondary to vancomycin toxicity and overdiuresis- since DCd- with improvement). Patient now admitted for a multidisciplinary rehab program. 04-25-25 @ 15:19    * Continue VAC dressing for sacral wound and Plastics f/u  * Left hip pain/inability to bear weight - limiting therapy progress - no acute findings on XR or CT - L hip MRI --increasing edema b/l greater trochanter, and myositis b/l gluteal and thigh muscles, unchanged from previous study, no other pathology noted   * L hip pain - improved on Celebrex - Celebrex 200mg BID   * PRAFO boots at HS     # Critical illness myopathy due to respiratory failure and sepsis  (present on admission)   - Gait Instability, ADL impairments and Functional impairments: start Comprehensive Rehab Program of PT/OT  - 3 hours a day, 5 days a week   - PRN: Nebulizer QID     BEA on BIPAP  --via nasal canula qhs    #Sacral Osteomyelitis  - Zosyn TID - last dose evening of 5/11/25    #Paroxysmal AFIB  - Eliquis 5mg BID     #Calf Tenderness  - BL LE doppler negative     #L hip pain  - limiting therapy progress   - Celebrex 200mg BID   - no acute findings on XR or CT   - L hip MRI (5/7) no significant findings     #HTN  - Amlodipine 10mg dialy     #Sleep/Mood  - Melatonin 9mg at HS   - Quetiapine 25mg at HS     #Skin  - LUQ surgical wound previous site of PEG tube and right groin- Clean wound and periwound skin with NS. Pat dry. Cover with small silicone foam with border. Change every other day or prn if soiled     Sacral/gluteal cleft to bilateral buttocks- Irrigate deep cavity and tunneling with Dakins solution 1/4 strength using peribottle or flushes, cleanse wound and satellite ulcerations to b/l buttocks with NS, Dry well. Apply Liquid barrier film to periwound skin. Apply TRIAD barrier paste to isolated ulcer overlying left buttock,  Apply Aquacel hydrofiber sheet to right buttock, pack sacral/gluteal cleft including deep tunnels with Dakins 1/4 strength moistened kerlix, leave at least 2" out at end to ensure full removal of packing with subsequent dressing changes. Cover entire area (sacrum and right buttock) with abdominal pad and tegaderm. Change twice a day and prn if soiled/compromised. Once dressing is in place and perineal care is provided, clean remaining skin with perineal spray, apply TRIAD moisture barrier cream to exposed skin every shift and prn.    Bilateral abdominal pannus, bilateral groin: Cleanse with luke-warm soap and water, dry well. Apply clotrimazole cream daily, followed by Interdry textile sheeting, under intertriginous folds leaving 2 inches exposed at ends to wick, remove to wash & dry affected area, then replace. Individual sheeting may be used for up to 5 days unless soiled. Inspect skin daily.     - L foot wound- betadine to followed by 4x4 gauze, ABD pad, and matilde daily  - R foot wound- mupirocin BID     -Continue to offload pressure; bariatric low airloss support surface, turn and position per protocol with use of fluidized positioning devices, continue incontinence and moisture care per protocol and use of single breathable incontinence pads, continue to offload heels with fluidized positioning devices/Ochoa-Lock positioning device (PS# [id number]). Continue use of bariatric seat cushion (people soft #[id number]) and limit sit time- no more than 2 consecutive hours at any given time.   - Pressure injury/Skin: OOB to Chair, PT/OT    - Ammonium lactate to BL LEs     #Neuropathy  - Cymbalta 60mg daily   - Gabapentin 1200mg TID     #Pain Mgmt   - Capsaicin cream to BL feet QID - Avoid use on damaged, broken, or irritated skin. Avoid occlusive dressing or heat   - Lidocaine patch to BL thighs   - PRN:; Oxycodone and Tylenol 650mg QID     #Morbid obesity  - Most recent weight 394lbs (5/7)     #GI/Bowel Mgmt   - Pantoprazole  - Bisacodyl 10mg at HS   - Miralax   - Naloxegal 25mg daily     #/Bladder Mgmt   #ELLA  #Urinary Retention  - Renvela 800mg TID  - Cr improving   - Bladder scan TID  - Flomax 0.4mg at HS     #FEN --Morbid obesity (BMI > 40).  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 CANTU's Liaison with Family/Providers:    5/5--D/w Plastics team, they did some bedside debridement of bleeding areas on sacral wound.,They recommended to continue VAC dressing     5/8--Girlfriend present at bedside, participated during review,   Discussed treatment plan including MRI findings and treatment plan of increasing dose of celebrex    -----------------------------------------------  OUTPATIENT/FOLLOW UP:    Your Primary Care Provider,   Phone: (   )    -  Fax: (   )    -  Follow Up Time: 1 week    [facility],   [address] [place] [address]  Phone: [phone]  Fax: (   )    -  Follow Up Time:    Dr. Waterhouse  Podiatry  [phone]  Within 1 week   -----------------------------------------------    39 year old male with PMH of morbid obesity, BEA on CPAP, pAFIB (not on AC), HTN, and BL papilledema attributed to pseudotumor cerebri; who initially presented to  on 2/24 with SOB and BL LE edema. Found to have URI with progressive SOB and multifocal PNA on imaging. His hospital course was complicated by worsening respiratory distress and increased 02 demands secondary to secretions (requiring multiple intubation attempts) and eventual MICU admission. While in MICU, he 02 requirements continued despite optimal vent management. Concern noted for progressive ARDS, unable to prone given morbid obesity, and ultimately cannulated for vvECMO on 2/25 and transferred to [facility] for further management on 2/26.  His hospital course at [facility] was significant for the following: diuretic and ABX management, s/p trache and PEG (placed on 3/7- PEG since removed), RCU admission, high grade fevers (secondary to panniculitis), progressively worsening sacral ulcer (likely osteomyelitis- s/p surgical debridement), BL foot wound (secondary to pressor use), neuropathy (secondary to critical illness polyneuropathy), ELLA (secondary to vancomycin toxicity and overdiuresis- since DCd- with improvement). Patient now admitted for a multidisciplinary rehab program. 04-25-25 @ 15:19    * Continue VAC dressing for sacral wound and Plastics f/u  * L hip pain -responsive to celebrex, engaged in therapy today   * PRAFO boots at HS     # Critical illness myopathy due to respiratory failure and sepsis  (present on admission)   - Gait Instability, ADL impairments and Functional impairments: start Comprehensive Rehab Program of PT/OT  - 3 hours a day, 5 days a week   - PRN: Nebulizer QID     BEA on BIPAP  --via nasal canula qhs    #Sacral Osteomyelitis  - Zosyn TID - last dose evening of 5/11/25    #Paroxysmal AFIB  - Eliquis 5mg BID     #Calf Tenderness  - BL LE doppler negative     #L hip pain  - limiting therapy progress   - Celebrex 200mg BID   - no acute findings on XR or CT   - L hip MRI (5/7) no significant findings     #HTN  - Amlodipine 10mg dialy     #Sleep/Mood  - Melatonin 9mg at HS   - Quetiapine 25mg at HS     #Skin  - LUQ surgical wound previous site of PEG tube and right groin- Clean wound and periwound skin with NS. Pat dry. Cover with small silicone foam with border. Change every other day or prn if soiled     Sacral/gluteal cleft to bilateral buttocks- Irrigate deep cavity and tunneling with Dakins solution 1/4 strength using peribottle or flushes, cleanse wound and satellite ulcerations to b/l buttocks with NS, Dry well. Apply Liquid barrier film to periwound skin. Apply TRIAD barrier paste to isolated ulcer overlying left buttock,  Apply Aquacel hydrofiber sheet to right buttock, pack sacral/gluteal cleft including deep tunnels with Dakins 1/4 strength moistened kerlix, leave at least 2" out at end to ensure full removal of packing with subsequent dressing changes. Cover entire area (sacrum and right buttock) with abdominal pad and tegaderm. Change twice a day and prn if soiled/compromised. Once dressing is in place and perineal care is provided, clean remaining skin with perineal spray, apply TRIAD moisture barrier cream to exposed skin every shift and prn.    Bilateral abdominal pannus, bilateral groin: Cleanse with luke-warm soap and water, dry well. Apply clotrimazole cream daily, followed by Interdry textile sheeting, under intertriginous folds leaving 2 inches exposed at ends to wick, remove to wash & dry affected area, then replace. Individual sheeting may be used for up to 5 days unless soiled. Inspect skin daily.     - L foot wound- betadine to followed by 4x4 gauze, ABD pad, and matilde daily  - R foot wound- mupirocin BID     -Continue to offload pressure; bariatric low airloss support surface, turn and position per protocol with use of fluidized positioning devices, continue incontinence and moisture care per protocol and use of single breathable incontinence pads, continue to offload heels with fluidized positioning devices/Ochoa-Lock positioning device (PS# [id number]). Continue use of bariatric seat cushion (people soft #[id number]) and limit sit time- no more than 2 consecutive hours at any given time.   - Pressure injury/Skin: OOB to Chair, PT/OT    - Ammonium lactate to BL LEs     #Neuropathy  - Cymbalta 60mg daily   - Gabapentin 1200mg TID     #Pain Mgmt   - Capsaicin cream to BL feet QID - Avoid use on damaged, broken, or irritated skin. Avoid occlusive dressing or heat   - Lidocaine patch to BL thighs   - PRN:; Oxycodone and Tylenol 650mg QID     #Morbid obesity  - Most recent weight 394lbs (5/7)     #GI/Bowel Mgmt   - Pantoprazole  - Bisacodyl 10mg at HS   - Miralax   - Naloxegal 25mg daily     #/Bladder Mgmt   #ELLA  #Urinary Retention  - Renvela 800mg TID  - Cr improving   - Bladder scan TID  - Flomax 0.4mg at HS     #FEN --Morbid obesity (BMI > 40).  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 DYSONs Liaison with Family/Providers:    5/5--D/w Plastics team, they did some bedside debridement of bleeding areas on sacral wound.,They recommended to continue VAC dressing     5/8--Girlfriend present at bedside, participated during review,   Discussed treatment plan including MRI findings and treatment plan of increasing dose of celebrex    -----------------------------------------------  OUTPATIENT/FOLLOW UP:    Your Primary Care Provider,   Phone: (   )    -  Fax: (   )    -  Follow Up Time: 1 week    [facility],   [address] [place] [address]  Phone: [phone]  Fax: (   )    -  Follow Up Time:    Dr. Waterhouse  Podiatry  [phone]  Within 1 week   -----------------------------------------------

## 2025-05-09 NOTE — PROGRESS NOTE ADULT - ASSESSMENT
38 y/o M with PMH of morbid obesity, BEA, pAFIB (not on AC), HTN, and BL papilledema attributed to pseudotumor cerebri; who initially presented to  on 2/24 with SOB and BL LE edema. Found to have URI with progressive SOB and multifocal PNA on imaging. His hospital course was complicated by worsening respiratory distress and increased O2 demands secondary to secretions (requiring multiple intubation attempts) and eventual MICU admission. While in MICU, he O2 requirements continued despite optimal vent management. Concern noted for progressive ARDS, unable to prone given morbid obesity, and ultimately cannulated for vvECMO on 2/25 and transferred to [facility] for further management on 2/26. His hospital course at [facility] was significant for the following: diuretic and ABX management, s/p trach and PEG (placed on 3/7- PEG since removed), RCU admission, high grade fevers (secondary to panniculitis), progressively worsening sacral ulcer (likely osteomyelitis- s/p surgical debridement), BL foot wound (secondary to pressor use), neuropathy (secondary to critical illness polyneuropathy), ELLA (secondary to vancomycin toxicity and overdiuresis- since DCd- with improvement). Patient now admitted for a multidisciplinary rehab program. 04-25-25 @ 15:19    Abnormal CT A/P:   - 5/2: CT A/P: Question of pneumatosis in the cecum with no other evidence of bowel ischemia. Correlation with lactate levels and clinical exam would be helpful.  - Patient has no symptoms. Abdomen is benign. Tolerating PO  - 5/2: Surgery cx noted: no intervention. c/w PO. refer to bariatric surgery post-DC dr botello at [place] or Dr. Segovia at [place] [per patient request]  - 5/3 GI cx noted: no GI intervention planned. c/w PO    left hip pain - Improving  - on celebrex. patient to avoid if pain is tolerable as risk of bleeding is high with eliquis  - hip xray--> no fracture  - CT hip: Again seen is a sacral decubitus wound which extends superficial as well as involving the right gluteus musculature and the posterior L5 with small amount of fluid and gas within the tract. Limited involvement on this noncontrast CT. This likely corresponds to adjacent phlegmonous changes  - MRI left hip: Incompletely characterized sacral wound with associated ill-defined collection of fluid and soft tissue gas which extends to/involve the right gluteus zurdo muscle as at CT pelvis study from one day prior. No interval change in the high STIR signal within the bilateral gluteal and thigh musculature suggestive of a myositis as compared to prior MRI study. No fracture, osseous destruction or aggressive periosteal reaction. No osteomyelitis at the left hip. Edema-like signal along the right and left greater trochanteric regions, overall increased as compared to the prior MRI study from 4/11/2025. Findings are felt to be reactive in etiology..  - pain control, PT  per primary    #Possible Sacral Osteomyelitis  - Continue Zosyn TID - last dose evening of 5/11/25  - ID noted    #Stage IV Sacral Decub Ulcer  #Bilateral Buttocks Wounds  #Bilateral Foot Wounds  -5/2 CT A/P: An air and fluid containing collection in the right gluteus zurdo muscle in continuity with a subcutaneous sacral collection. No obvious skin defect to suggest a decubitus ulcer.  -5/7: MRI left hip: Incompletely characterized sacral wound with associated ill-defined collection of fluid and soft tissue gas which extends to/involve the right gluteus zurdo muscle as at CT pelvis study from one day prior. No interval change in the high STIR signal within the bilateral gluteal and thigh musculature suggestive of a myositis as compared to prior MRI study. No fracture, osseous destruction or aggressive periosteal reaction. No osteomyelitis at the left hip. Edema-like signal along the right and left greater trochanteric regions, overall increased as compared to the prior MRI study from 4/11/2025. Findings are felt to be reactive in etiology..  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 RV is not well visualized, enlarged RV cavity size a/ reduced RV systolic function. No significant valvular disease.  No echocardiographic evidence of pulmonary hypertension.  -s/p Trach (decannulated 3/28) and PEG (removed 4/15)   -Saturating well on RA  -Continue duoneb PRN   -BIPAP QHS (FiO2 40%, 18/10) via nasal mask   -Fall precaution  -Outpatient pulm follow up     #Chronic AFIB  #Sinus Tachycardia  - Patient has intermittent tachycardia. Per patient, His HR amostly above 110 even when he is not sick. follows with cardio OP. not interested in rate control meds at this time.   - 5/3: EKG: NSR@94 bpm  - EKG 4/28 showed sinus tachycardia, no acute acute ST/T wave changes  - c/w Eliquis 5mg BID   - Not on AVN blocker   - TSH WNL 2/25  - Sinus tachycardia likely multifactorial including pain and deconditioning. Low suspicion for PE, no SOB/hypoxia and he is already on full AC    #RIJ and Bilateral LE DVT  -Duplex RUE 3/14 showed Acute, non-occlusive deep vein thrombosis noted within the right internal jugular vein. Superficial vein thrombosis noted within the right antecubital cephalic vein.  -Duplex LE 3/14 showed Acute, occlusive deep vein thromboses noted within the right and left peroneal veins at both mid calves. Age-indeterminate, occlusive deep vein thrombosis visualized within the left gastrocnemius vein at the proximal calf.  -Continue eliquis  -Repeat duplex 4/29 showed No evidence of deep venous thrombosis in either lower extremity.    #Normocytic Anemia   -Likely multifactorial  -H/H stable, no evidence of active bleed   -Monitor CBC     #HTN  -Continue Amlodipine  -Monitor BP     #Sleep/Mood  -Continue Melatonin  -Continue Quetiapine    #History of Pseudotumor Cerebri   -Outpatient follow up     #ELLA (Improved)  -Baseline Cr appears to be ~0.8 range   -ELLA felt to be multifactorial in setting of cardiorenal w/ RVE, recurrent ELLA suspected due to vancomycin toxicity and diuresis   -Continue to encourage oral hydration   -Continue Renvela 800mg TID, monitor phos level  -Low phos diet  -Monitor BMP    #Type 2 Diabetes  -HbA1C 6.7 on 2/25, Repeat HbA1C 4.9 on 4/28  -FS trend reviewed, has been within goal  -RAISS discontinued  -Monitor glucose on routine lab, controlled     #/Urinary Retention  -Flomax 5/1  -Monitor bladder scans     #DVT PPx - Eliquis  #GI PPx - PPI

## 2025-05-09 NOTE — PROGRESS NOTE ADULT - SUBJECTIVE AND OBJECTIVE BOX
Patient is a 39y old  Male who presents with a chief complaint of Debility secondary to acute hypoxic respiratory failure (09 May 2025 09:18)      Patient seen and examined at bedside. NAD. denies acute medical complaints.     ALLERGIES:  No Known Allergies    MEDICATIONS  (STANDING):  amLODIPine   Tablet 10 milliGRAM(s) Oral daily  ammonium lactate 12% Lotion 1 Application(s) Topical two times a day  apixaban 5 milliGRAM(s) Oral two times a day  ascorbic acid 500 milliGRAM(s) Oral daily  bisacodyl 10 milliGRAM(s) Oral at bedtime  capsaicin 0.025% Cream 1 Application(s) Topical four times a day  celecoxib 200 milliGRAM(s) Oral every 12 hours  clotrimazole 1% Cream 1 Application(s) Topical <User Schedule>  Dakins Solution - 1/4 Strength 1 Application(s) Topical two times a day  dextrose 5%. 1000 milliLiter(s) (50 mL/Hr) IV Continuous <Continuous>  dextrose 5%. 1000 milliLiter(s) (100 mL/Hr) IV Continuous <Continuous>  dextrose 50% Injectable 25 Gram(s) IV Push once  dextrose 50% Injectable 12.5 Gram(s) IV Push once  dextrose 50% Injectable 25 Gram(s) IV Push once  DULoxetine 60 milliGRAM(s) Oral daily  gabapentin 1200 milliGRAM(s) Oral every 8 hours  glucagon  Injectable 1 milliGRAM(s) IntraMuscular once  influenza   Vaccine 0.5 milliLiter(s) IntraMuscular once  lidocaine   4% Patch 1 Patch Transdermal daily  lidocaine   4% Patch 1 Patch Transdermal daily  magnesium oxide 400 milliGRAM(s) Oral three times a day with meals  melatonin 9 milliGRAM(s) Oral at bedtime  multivitamin 1 Tablet(s) Oral daily  mupirocin 2% Ointment 1 Application(s) Topical two times a day  naloxegol 25 milliGRAM(s) Oral daily  pantoprazole    Tablet 40 milliGRAM(s) Oral before breakfast  piperacillin/tazobactam IVPB.. 3.375 Gram(s) IV Intermittent every 8 hours  polyethylene glycol 3350 17 Gram(s) Oral two times a day  povidone iodine 10% Solution 1 Application(s) Topical daily  QUEtiapine 25 milliGRAM(s) Oral at bedtime  sevelamer carbonate 800 milliGRAM(s) Oral three times a day with meals  silver nitrate Applicator 6 Application(s) Topical once  tamsulosin 0.8 milliGRAM(s) Oral at bedtime  zinc sulfate 220 milliGRAM(s) Oral daily    MEDICATIONS  (PRN):  albuterol/ipratropium for Nebulization 3 milliLiter(s) Nebulizer every 6 hours PRN Shortness of Breath and/or Wheezing  dextrose Oral Gel 15 Gram(s) Oral once PRN Blood Glucose LESS THAN 70 milliGRAM(s)/deciliter  oxyCODONE    IR 20 milliGRAM(s) Oral every 6 hours PRN Severe Pain (7 - 10)  oxyCODONE    IR 15 milliGRAM(s) Oral two times a day PRN Moderate Pain (4 - 6)  oxyCODONE    IR 10 milliGRAM(s) Oral two times a day PRN Mild Pain (1 - 3)    Vital Signs Last 24 Hrs  T(F): 97.3 (09 May 2025 08:13), Max: 98.2 (08 May 2025 22:31)  HR: 91 (09 May 2025 08:13) (91 - 104)  BP: 136/85 (09 May 2025 08:13) (125/80 - 136/85)  RR: 16 (09 May 2025 08:13) (16 - 16)  SpO2: 96% (09 May 2025 08:13) (93% - 96%)  I&O's Summary    08 May 2025 07:01  -  09 May 2025 07:00  --------------------------------------------------------  IN: 0 mL / OUT: 1100 mL / NET: -1100 mL          PHYSICAL EXAM:  General: NAD, morbidly obese  ENT: MMM, no scleral icterus  Neck: Supple, No JVD  Lungs: Clear to auscultation bilaterally, no wheezes, rales, rhonchi  Cardio: RRR, S1/S2  Abdomen: Soft, Nontender, Nondistended; Bowel sounds present  Extremities: No calf tenderness, No pitting edema  Skin: b/l foot dressing, +wound vac     LABS:                        8.6    5.12  )-----------( 247      ( 08 May 2025 06:58 )             29.2       05-09    140  |  103  |  12  ----------------------------<  100  3.6   |  32  |  1.15    Ca    9.4      09 May 2025 06:51  Mg     1.7     05-09    TPro  7.4  /  Alb  2.3  /  TBili  0.3  /  DBili  x   /  AST  17  /  ALT  17  /  AlkPhos  55  05-09                03-08 Chol -- LDL -- HDL -- Trig 169 mg/dL                  Urinalysis Basic - ( 09 May 2025 06:51 )    Color: x / Appearance: x / SG: x / pH: x  Gluc: 100 mg/dL / Ketone: x  / Bili: x / Urobili: x   Blood: x / Protein: x / Nitrite: x   Leuk Esterase: x / RBC: x / WBC x   Sq Epi: x / Non Sq Epi: x / Bacteria: x        COVID-19 PCR: NotDetec (04-25-25 @ 18:31)      RADIOLOGY & ADDITIONAL TESTS:  < from: MR Hip No Cont, Left (05.07.25 @ 15:46) >  FINDINGS:  Similar findings of a large sacral wound with associated ill-defined   collection of fluid and gas as compared to CT pelvis from one day prior.   This collection extends into/involvement of the right gluteal zurdo   musculature. The collection is only partially characterized given there   is only visualized on the large field-of-view coronal images of the   pelvis.    Similar findings of high STIR signal involving the bilateral gluteal   musculature as well as the bilateral thigh musculature, specifically the   rectus femoris and vastus lateralis muscles as compared to the prior   examination.    There is advanced atrophy and edema within the bilateral quadratus   femoris muscles, similar to prior. Paraspinal muscle edema-like signal is   also noted in unchanged as compared to prior.    There are no advanced productive changes at the bilateral hips. Bilateral   hip joint effusions are noted as at prior MRI study.    No fracture or dislocation. No osseous destruction or aggressive   periosteal reaction. There is edema-like signal along the right and left   greater trochanteric regions, overall increased as compared to the prior   MRI study from 4/11/2025. No low T1 signal identified at the left hip to   suggest osteomyelitis.      IMPRESSION:  Incompletely characterized sacral wound with associated ill-defined   collection of fluid and soft tissue gas which extends to/involve the   right gluteus zurdo muscle as at CT pelvis study from one day prior.    No interval change in the high STIR signal within the bilateral gluteal   and thigh musculature suggestive of a myositis as compared to prior MRI   study.    No fracture, osseous destruction or aggressive periosteal reaction. No   osteomyelitis at the left hip.    Edema-like signal along the right and left greater trochanteric regions,   overall increased as compared to the prior MRI study from 4/11/2025.   Findings are felt to be reactive in etiology..    --- End of Report ---    < end of copied text >    Care Discussed with Consultants/Other Providers: yes, rehab

## 2025-05-09 NOTE — PROGRESS NOTE ADULT - SUBJECTIVE AND OBJECTIVE BOX
Subjective  Patient seen and examined this AM.   Girlfriend at bedside.   Admits to intermittent sleep, works night shift prior to admission.   L hip pain improved with Celebrex. Denies L hip pain at rest.   Sacral wound vac in place.   Discussed increasing Melatonin.     ROS--no acute pain, no fever or chest pain   B/l leg and sacral ulcers  Left hip pain with movement  LBM 5/8    Function --Observed in therapy, improvement with engagement and better pain control noted   Pt rolled from side-to-side multi times throughout session. Pt able to roll to L  side Sup with no increase in pain. Pt able to roll to R side with min A 2/2 pain  in L hip. Ice at L hip to assist with pain.  Pt performed supine to sit and sitting at EOB mod A at b/l LEs only assist.  Pt from sitting at EOB with weight shifting from side to side to assist with  increasing WB onto  Pt from sit to supine min A at L LE, Pt able to roll and pull back up in bed min  A    Therapeutic Exercise  Pt performed supine UE, R LE, core therex:  -b/l LE hip IR 3x10  -b/l LE IR and hip flexion 3x10 with increased assist on l LE and decreased ROM  -Quad sets 3x10  -Hip abd with assist to remove friction 3x10 b/l  -Glute sets 3x10  -Hip ext flex 2x10 on R, 2x5 on L LE    Vital Signs Last 24 Hrs  T(C): 36.3 (09 May 2025 08:13), Max: 36.8 (08 May 2025 22:31)  T(F): 97.3 (09 May 2025 08:13), Max: 98.2 (08 May 2025 22:31)  HR: 91 (09 May 2025 08:13) (91 - 104)  BP: 136/85 (09 May 2025 08:13) (125/80 - 136/85)  BP(mean): --  RR: 16 (09 May 2025 08:13) (16 - 16)  SpO2: 96% (09 May 2025 08:13) (93% - 96%)    EXAM   Gen -  Comfortable, at rest, but pain with left leg movement   HEENT - NCAT, EOMI, MMM  Neck - Supple, No limited ROM  Pulm - CTAB, No wheeze, No rhonchi, No crackles  Cardiovascular - RRR, S1S2, No murmurs  Abdomen - Soft, non tender, +BS  Extremities - chronic skin changes consistent with elephantiasis, midline present RUE  Edema reducing     Neuro-  AAO X 3, Speech is normal     Motor - UE 5/5                    LEFT    LE - HF 1/5, KE 2/5 limited by bed size and body habitus, DF 1/5, PF 5/5--left hip/leg ROM limited by pain                     RIGHT LE - HF 3/5, KE 2/5 limited by bed size and body habitus , DF 1/5, PF 5/5        Sensory - Decreased to light touch bilateral lower extremities      Coordination - impaired lower extremities   Psychiatric - Mood stable, Affect WNL    Skin:  R foot plantar aspect lateral/distal side 7cm x 5.5cm ischemic wound from pressors  RLE 4th digit 1x1.5cm ischemic induced wound from pressors  RLE 5th digit 3x2cm ischemic induced wound from pressors  L foot plantar aspect lateral/distal side 6x7cm ischemic induced wound from pressor usage  LLE 4th digit 2x1cm ischemic induced wound from pressors  LLE 5th digit 2.5x3cm ischemic induced wound from pressors   R groin skin tear 1x1.5cm  Prior PEG insertion site 0.5x1cm   Former trach site 1x0.5cm  Unstageable pressure wound cocyx 7cmx4.5cm w/ 2.5cm depth  R buttock pressure induced wound semi contiguous with coccyx wound calling unstageable given prior debridement 8.5x5.5cm  L buttock 1.0x0.5cm stage 3 pressure injury  R buttock skin tear 3.5cm x 0.5cm and 1.5cmx0.5cm    MMS--Mod to severe pain with left leg movement       LABS:                        8.6    5.12  )-----------( 247      ( 08 May 2025 06:58 )             29.2     05-09    140  |  103  |  12  ----------------------------<  100[H]  3.6   |  32[H]  |  1.15    Ca    9.4      09 May 2025 06:51  Mg     1.7     05-09    TPro  7.4  /  Alb  2.3[L]  /  TBili  0.3  /  DBili  x   /  AST  17  /  ALT  17  /  AlkPhos  55  05-09      Urinalysis Basic - ( 09 May 2025 06:51 )    Color: x / Appearance: x / SG: x / pH: x  Gluc: 100 mg/dL / Ketone: x  / Bili: x / Urobili: x   Blood: x / Protein: x / Nitrite: x   Leuk Esterase: x / RBC: x / WBC x   Sq Epi: x / Non Sq Epi: x / Bacteria: x        < from: Xray Hip 1 View, Left (05.03.25 @ 13:18) >  PROCEDURE DATE:  05/03/2025    INTERPRETATION:  Radiographs of the LEFT hip  No fracture or dislocation.  Femoral acetabular joint space radiographically intact.  No gross soft tissue abnormality.  IMPRESSION:    No acute radiographic osseous pathology..  Please review  pelvic CT report 5/1/2025      < from: MR Hip No Cont, Left (05.07.25 @ 15:46) >  PROCEDURE DATE:  05/07/2025          INTERPRETATION:  Exam Type: MR HIP LEFT  Exam Date and Time: 5/7/2025 3:46 PM  Indication: Left hip pain.  Comparison: MRI pelvis 4/11/2025. CT pelvis 5/6/2025.    TECHNIQUE:  MRI dedicated to the left hip in multiple planes and large field-of-view   of the bony pelvis in the coronal plane was performed on a 1.5 Maria De Jesus   system using a standard non-contrast protocol.    FINDINGS:  Similar findings of a large sacral wound with associated ill-defined   collection of fluid and gas as compared to CT pelvis from one day prior.   This collection extends into/involvement of the right gluteal zurdo   musculature. The collection is only partially characterized given there   is only visualized on the large field-of-view coronal images of the   pelvis.    Similar findings of high STIR signal involving the bilateral gluteal   musculature as well as the bilateral thigh musculature, specifically the   rectus femoris and vastus lateralis muscles as compared to the prior   examination.    There is advanced atrophy and edema within the bilateral quadratus   femoris muscles, similar to prior. Paraspinal muscle edema-like signal is   also noted in unchanged as compared to prior.    There are no advanced productive changes at the bilateral hips. Bilateral   hip joint effusions are noted as at prior MRI study.    No fracture or dislocation. No osseous destruction or aggressive   periosteal reaction. There is edema-like signal along the right and left   greater trochanteric regions, overall increased as compared to the prior   MRI study from 4/11/2025. No low T1 signal identified at the left hip to   suggest osteomyelitis.      IMPRESSION:  Incompletely characterized sacral wound with associated ill-defined   collection of fluid and soft tissue gas which extends to/involve the   right gluteus zurdo muscle as at CT pelvis study from one day prior.    No interval change in the high STIR signal within the bilateral gluteal   and thigh musculature suggestive of a myositis as compared to prior MRI   study.    No fracture, osseous destruction or aggressive periosteal reaction. No   osteomyelitis at the left hip.    Edema-like signal along the right and left greater trochanteric regions,   overall increased as compared to the prior MRI study from 4/11/2025.   Findings are felt to be reactive in etiology..          MEDICATIONS  (STANDING):  amLODIPine   Tablet 10 milliGRAM(s) Oral daily  ammonium lactate 12% Lotion 1 Application(s) Topical two times a day  apixaban 5 milliGRAM(s) Oral two times a day  ascorbic acid 500 milliGRAM(s) Oral daily  bisacodyl 10 milliGRAM(s) Oral at bedtime  capsaicin 0.025% Cream 1 Application(s) Topical four times a day  celecoxib 200 milliGRAM(s) Oral every 12 hours  clotrimazole 1% Cream 1 Application(s) Topical <User Schedule>  Dakins Solution - 1/4 Strength 1 Application(s) Topical two times a day  dextrose 5%. 1000 milliLiter(s) (50 mL/Hr) IV Continuous <Continuous>  dextrose 5%. 1000 milliLiter(s) (100 mL/Hr) IV Continuous <Continuous>  dextrose 50% Injectable 25 Gram(s) IV Push once  dextrose 50% Injectable 12.5 Gram(s) IV Push once  dextrose 50% Injectable 25 Gram(s) IV Push once  DULoxetine 60 milliGRAM(s) Oral daily  gabapentin 1200 milliGRAM(s) Oral every 8 hours  glucagon  Injectable 1 milliGRAM(s) IntraMuscular once  influenza   Vaccine 0.5 milliLiter(s) IntraMuscular once  lidocaine   4% Patch 1 Patch Transdermal daily  lidocaine   4% Patch 1 Patch Transdermal daily  magnesium oxide 400 milliGRAM(s) Oral three times a day with meals  melatonin 9 milliGRAM(s) Oral at bedtime  multivitamin 1 Tablet(s) Oral daily  mupirocin 2% Ointment 1 Application(s) Topical two times a day  naloxegol 25 milliGRAM(s) Oral daily  pantoprazole    Tablet 40 milliGRAM(s) Oral before breakfast  piperacillin/tazobactam IVPB.. 3.375 Gram(s) IV Intermittent every 8 hours  polyethylene glycol 3350 17 Gram(s) Oral two times a day  povidone iodine 10% Solution 1 Application(s) Topical daily  QUEtiapine 25 milliGRAM(s) Oral at bedtime  sevelamer carbonate 800 milliGRAM(s) Oral three times a day with meals  silver nitrate Applicator 6 Application(s) Topical once  tamsulosin 0.8 milliGRAM(s) Oral at bedtime  zinc sulfate 220 milliGRAM(s) Oral daily    MEDICATIONS  (PRN):  albuterol/ipratropium for Nebulization 3 milliLiter(s) Nebulizer every 6 hours PRN Shortness of Breath and/or Wheezing  dextrose Oral Gel 15 Gram(s) Oral once PRN Blood Glucose LESS THAN 70 milliGRAM(s)/deciliter  oxyCODONE    IR 20 milliGRAM(s) Oral every 6 hours PRN Severe Pain (7 - 10)  oxyCODONE    IR 15 milliGRAM(s) Oral two times a day PRN Moderate Pain (4 - 6)  oxyCODONE    IR 10 milliGRAM(s) Oral two times a day PRN Mild Pain (1 - 3)     Subjective  Patient seen and examined this AM.   Admits to disturbed sleep,   L hip pain occuring with movement, but intensity reduced with Celebrex.  Sacral wound vac in place.   Discussed increasing Melatonin dose    ROS--no acute pain, no fever or chest pain   B/l leg and sacral ulcers  Left hip pain with movement  LBM 5/8    Function   Pt rolled from side-to-side multi times throughout session. Pt able to roll to L  side Sup with no increase in pain. Pt able to roll to R side with min A 2/2 pain  in L hip to don on Nette pad.  Pt Nette from bed to WC with an assist of 2 with assist for L LE and wound vac.    Pt performed 4 standing trials in // bars with a min/mod A x3. Pt able to stand  from 45-60". Pt able to ambulate 3-4 steps on 3rd trials with assist blocking  stance leg, Pt able to advance alternate LE with only VCs and a WC follow in //  bars.  Pt use hip extension sheet to assist with hip extension.  Pt required prolonged rest breaks in between each set 2/2 pain and fatigue    Vital Signs Last 24 Hrs  T(C): 36.3 (09 May 2025 08:13), Max: 36.8 (08 May 2025 22:31)  T(F): 97.3 (09 May 2025 08:13), Max: 98.2 (08 May 2025 22:31)  HR: 91 (09 May 2025 08:13) (91 - 104)  BP: 136/85 (09 May 2025 08:13) (125/80 - 136/85)  BP(mean): --  RR: 16 (09 May 2025 08:13) (16 - 16)  SpO2: 96% (09 May 2025 08:13) (93% - 96%)    EXAM   Gen -  Comfortable, at rest, but pain with left leg movement   HEENT - NCAT, EOMI, MMM  Neck - Supple, No limited ROM  Pulm - Clear  Cardiovascular - RRR, S1S2, No murmurs  Abdomen - Soft, non tender, +BS  Extremities - chronic skin changes consistent with elephantiasis, midline present RUE  Edema reducing     Neuro-  AAO X 3, Speech is normal     Motor - UE 5/5                    LEFT    LE - HF 1/5, KE 2/5 limited by bed size and body habitus, DF 1/5, PF 5/5--left hip/leg ROM limited by pain                     RIGHT LE - HF 3/5, KE 2/5 limited by bed size and body habitus , DF 1/5, PF 5/5        Sensory - Decreased to light touch bilateral lower extremities      Coordination - impaired lower extremities   Psychiatric - Mood stable, Affect WNL    Skin:  R foot plantar aspect lateral/distal side 7cm x 5.5cm ischemic wound from pressors  RLE 4th digit 1x1.5cm ischemic induced wound from pressors  RLE 5th digit 3x2cm ischemic induced wound from pressors  L foot plantar aspect lateral/distal side 6x7cm ischemic induced wound from pressor usage  LLE 4th digit 2x1cm ischemic induced wound from pressors  LLE 5th digit 2.5x3cm ischemic induced wound from pressors   R groin skin tear 1x1.5cm  Prior PEG insertion site 0.5x1cm   Former trach site 1x0.5cm  Unstageable pressure wound cocyx 7cmx4.5cm w/ 2.5cm depth  R buttock pressure induced wound semi contiguous with coccyx wound calling unstageable given prior debridement 8.5x5.5cm  L buttock 1.0x0.5cm stage 3 pressure injury  R buttock skin tear 3.5cm x 0.5cm and 1.5cmx0.5cm    MMS--Mod to severe pain with left leg movement       LABS:                        8.6    5.12  )-----------( 247      ( 08 May 2025 06:58 )             29.2     05-09    140  |  103  |  12  ----------------------------<  100[H]  3.6   |  32[H]  |  1.15    Ca    9.4      09 May 2025 06:51  Mg     1.7     05-09    TPro  7.4  /  Alb  2.3[L]  /  TBili  0.3  /  DBili  x   /  AST  17  /  ALT  17  /  AlkPhos  55  05-09      Urinalysis Basic - ( 09 May 2025 06:51 )    Color: x / Appearance: x / SG: x / pH: x  Gluc: 100 mg/dL / Ketone: x  / Bili: x / Urobili: x   Blood: x / Protein: x / Nitrite: x   Leuk Esterase: x / RBC: x / WBC x   Sq Epi: x / Non Sq Epi: x / Bacteria: x        < from: Xray Hip 1 View, Left (05.03.25 @ 13:18) >  PROCEDURE DATE:  05/03/2025    INTERPRETATION:  Radiographs of the LEFT hip  No fracture or dislocation.  Femoral acetabular joint space radiographically intact.  No gross soft tissue abnormality.  IMPRESSION:    No acute radiographic osseous pathology..  Please review  pelvic CT report 5/1/2025      MEDICATIONS  (STANDING):  amLODIPine   Tablet 10 milliGRAM(s) Oral daily  ammonium lactate 12% Lotion 1 Application(s) Topical two times a day  apixaban 5 milliGRAM(s) Oral two times a day  ascorbic acid 500 milliGRAM(s) Oral daily  bisacodyl 10 milliGRAM(s) Oral at bedtime  capsaicin 0.025% Cream 1 Application(s) Topical four times a day  celecoxib 200 milliGRAM(s) Oral every 12 hours  clotrimazole 1% Cream 1 Application(s) Topical <User Schedule>  Dakins Solution - 1/4 Strength 1 Application(s) Topical two times a day  dextrose 5%. 1000 milliLiter(s) (50 mL/Hr) IV Continuous <Continuous>  dextrose 5%. 1000 milliLiter(s) (100 mL/Hr) IV Continuous <Continuous>  dextrose 50% Injectable 25 Gram(s) IV Push once  dextrose 50% Injectable 12.5 Gram(s) IV Push once  dextrose 50% Injectable 25 Gram(s) IV Push once  DULoxetine 60 milliGRAM(s) Oral daily  gabapentin 1200 milliGRAM(s) Oral every 8 hours  glucagon  Injectable 1 milliGRAM(s) IntraMuscular once  influenza   Vaccine 0.5 milliLiter(s) IntraMuscular once  lidocaine   4% Patch 1 Patch Transdermal daily  lidocaine   4% Patch 1 Patch Transdermal daily  magnesium oxide 400 milliGRAM(s) Oral three times a day with meals  melatonin 9 milliGRAM(s) Oral at bedtime  multivitamin 1 Tablet(s) Oral daily  mupirocin 2% Ointment 1 Application(s) Topical two times a day  naloxegol 25 milliGRAM(s) Oral daily  pantoprazole    Tablet 40 milliGRAM(s) Oral before breakfast  piperacillin/tazobactam IVPB.. 3.375 Gram(s) IV Intermittent every 8 hours  polyethylene glycol 3350 17 Gram(s) Oral two times a day  povidone iodine 10% Solution 1 Application(s) Topical daily  QUEtiapine 25 milliGRAM(s) Oral at bedtime  sevelamer carbonate 800 milliGRAM(s) Oral three times a day with meals  silver nitrate Applicator 6 Application(s) Topical once  tamsulosin 0.8 milliGRAM(s) Oral at bedtime  zinc sulfate 220 milliGRAM(s) Oral daily    MEDICATIONS  (PRN):  albuterol/ipratropium for Nebulization 3 milliLiter(s) Nebulizer every 6 hours PRN Shortness of Breath and/or Wheezing  dextrose Oral Gel 15 Gram(s) Oral once PRN Blood Glucose LESS THAN 70 milliGRAM(s)/deciliter  oxyCODONE    IR 20 milliGRAM(s) Oral every 6 hours PRN Severe Pain (7 - 10)  oxyCODONE    IR 15 milliGRAM(s) Oral two times a day PRN Moderate Pain (4 - 6)  oxyCODONE    IR 10 milliGRAM(s) Oral two times a day PRN Mild Pain (1 - 3)

## 2025-05-10 PROCEDURE — 99232 SBSQ HOSP IP/OBS MODERATE 35: CPT

## 2025-05-10 RX ADMIN — AMLODIPINE BESYLATE 10 MILLIGRAM(S): 10 TABLET ORAL at 05:47

## 2025-05-10 RX ADMIN — Medication 25 GRAM(S): at 13:24

## 2025-05-10 RX ADMIN — Medication 400 MILLIGRAM(S): at 12:08

## 2025-05-10 RX ADMIN — Medication 25 GRAM(S): at 05:47

## 2025-05-10 RX ADMIN — GABAPENTIN 1200 MILLIGRAM(S): 400 CAPSULE ORAL at 05:47

## 2025-05-10 RX ADMIN — APIXABAN 5 MILLIGRAM(S): 2.5 TABLET, FILM COATED ORAL at 05:47

## 2025-05-10 RX ADMIN — OXYCODONE HYDROCHLORIDE 20 MILLIGRAM(S): 30 TABLET ORAL at 12:15

## 2025-05-10 RX ADMIN — APIXABAN 5 MILLIGRAM(S): 2.5 TABLET, FILM COATED ORAL at 18:06

## 2025-05-10 RX ADMIN — Medication 400 MILLIGRAM(S): at 07:59

## 2025-05-10 RX ADMIN — Medication 400 MILLIGRAM(S): at 18:06

## 2025-05-10 RX ADMIN — NALOXEGOL OXALATE 25 MILLIGRAM(S): 12.5 TABLET, FILM COATED ORAL at 12:15

## 2025-05-10 RX ADMIN — SEVELAMER HYDROCHLORIDE 800 MILLIGRAM(S): 800 TABLET ORAL at 12:08

## 2025-05-10 RX ADMIN — Medication 9 MILLIGRAM(S): at 21:49

## 2025-05-10 RX ADMIN — SEVELAMER HYDROCHLORIDE 800 MILLIGRAM(S): 800 TABLET ORAL at 18:06

## 2025-05-10 RX ADMIN — QUETIAPINE FUMARATE 25 MILLIGRAM(S): 25 TABLET ORAL at 21:49

## 2025-05-10 RX ADMIN — Medication 1 TABLET(S): at 12:08

## 2025-05-10 RX ADMIN — Medication 1 APPLICATION(S): at 17:12

## 2025-05-10 RX ADMIN — SODIUM HYPOCHLORITE 1 APPLICATION(S): 0.12 SOLUTION TOPICAL at 18:07

## 2025-05-10 RX ADMIN — Medication 1 APPLICATION(S): at 13:35

## 2025-05-10 RX ADMIN — CELECOXIB 200 MILLIGRAM(S): 50 CAPSULE ORAL at 05:47

## 2025-05-10 RX ADMIN — Medication 220 MILLIGRAM(S): at 12:08

## 2025-05-10 RX ADMIN — OXYCODONE HYDROCHLORIDE 20 MILLIGRAM(S): 30 TABLET ORAL at 13:15

## 2025-05-10 RX ADMIN — TAMSULOSIN HYDROCHLORIDE 0.8 MILLIGRAM(S): 0.4 CAPSULE ORAL at 21:49

## 2025-05-10 RX ADMIN — SEVELAMER HYDROCHLORIDE 800 MILLIGRAM(S): 800 TABLET ORAL at 07:59

## 2025-05-10 RX ADMIN — Medication 40 MILLIGRAM(S): at 05:47

## 2025-05-10 RX ADMIN — GABAPENTIN 1200 MILLIGRAM(S): 400 CAPSULE ORAL at 13:24

## 2025-05-10 RX ADMIN — Medication 1 APPLICATION(S): at 05:51

## 2025-05-10 RX ADMIN — Medication 1 APPLICATION(S): at 18:07

## 2025-05-10 RX ADMIN — DULOXETINE 60 MILLIGRAM(S): 20 CAPSULE, DELAYED RELEASE ORAL at 12:07

## 2025-05-10 RX ADMIN — GABAPENTIN 1200 MILLIGRAM(S): 400 CAPSULE ORAL at 21:49

## 2025-05-10 RX ADMIN — Medication 25 GRAM(S): at 21:50

## 2025-05-10 RX ADMIN — Medication 500 MILLIGRAM(S): at 12:07

## 2025-05-10 RX ADMIN — CELECOXIB 200 MILLIGRAM(S): 50 CAPSULE ORAL at 18:06

## 2025-05-10 RX ADMIN — OXYCODONE HYDROCHLORIDE 20 MILLIGRAM(S): 30 TABLET ORAL at 18:16

## 2025-05-10 RX ADMIN — MUPIROCIN CALCIUM 1 APPLICATION(S): 20 CREAM TOPICAL at 18:07

## 2025-05-10 RX ADMIN — CLOTRIMAZOLE 1 APPLICATION(S): 1 CREAM TOPICAL at 13:35

## 2025-05-10 NOTE — PROGRESS NOTE ADULT - ASSESSMENT
39 year old male with PMH of morbid obesity, BEA on CPAP, pAFIB (not on AC), HTN, and BL papilledema attributed to pseudotumor cerebri; who initially presented to  on 2/24 with SOB and BL LE edema. Found to have URI with progressive SOB and multifocal PNA on imaging. His hospital course was complicated by worsening respiratory distress and increased 02 demands secondary to secretions (requiring multiple intubation attempts) and eventual MICU admission. While in MICU, he 02 requirements continued despite optimal vent management. Concern noted for progressive ARDS, unable to prone given morbid obesity, and ultimately cannulated for vvECMO on 2/25 and transferred to [facility] for further management on 2/26.  His hospital course at [facility] was significant for the following: diuretic and ABX management, s/p trache and PEG (placed on 3/7- PEG since removed), RCU admission, high grade fevers (secondary to panniculitis), progressively worsening sacral ulcer (likely osteomyelitis- s/p surgical debridement), BL foot wound (secondary to pressor use), neuropathy (secondary to critical illness polyneuropathy), ELLA (secondary to vancomycin toxicity and overdiuresis- since DCd- with improvement). Patient now admitted for a multidisciplinary rehab program. 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  (present on admission)   - Gait Instability, ADL impairments and Functional impairments: Continue Comprehensive Rehab Program of PT/OT  - 3 hours a day, 5 days a week   - PRN: Nebulizer QID     BEA on BIPAP  --via nasal canula qhs    #Sacral Osteomyelitis  - Zosyn TID - last dose evening of 5/11/25    #Paroxysmal AFIB  - Eliquis 5mg BID     #Calf Tenderness  - BL LE doppler negative     #L hip pain- improving  - Celebrex 200mg BID   - no acute findings on XR or CT   - L hip MRI (5/7) no significant findings     #HTN  - Amlodipine 10mg dialy     #Sleep/Mood  - Melatonin 9mg at HS   - Quetiapine 25mg at HS     #Skin  - LUQ surgical wound previous site of PEG tube and right groin- Clean wound and periwound skin with NS. Pat dry. Cover with small silicone foam with border. Change every other day or prn if soiled     Sacral/gluteal cleft to bilateral buttocks- Irrigate deep cavity and tunneling with Dakins solution 1/4 strength using peribottle or flushes, cleanse wound and satellite ulcerations to b/l buttocks with NS, Dry well. Apply Liquid barrier film to periwound skin. Apply TRIAD barrier paste to isolated ulcer overlying left buttock,  Apply Aquacel hydrofiber sheet to right buttock, pack sacral/gluteal cleft including deep tunnels with Dakins 1/4 strength moistened kerlix, leave at least 2" out at end to ensure full removal of packing with subsequent dressing changes. Cover entire area (sacrum and right buttock) with abdominal pad and tegaderm. Change twice a day and prn if soiled/compromised. Once dressing is in place and perineal care is provided, clean remaining skin with perineal spray, apply TRIAD moisture barrier cream to exposed skin every shift and prn.    Bilateral abdominal pannus, bilateral groin: Cleanse with luke-warm soap and water, dry well. Apply clotrimazole cream daily, followed by Interdry textile sheeting, under intertriginous folds leaving 2 inches exposed at ends to wick, remove to wash & dry affected area, then replace. Individual sheeting may be used for up to 5 days unless soiled. Inspect skin daily.     - L foot wound- betadine to followed by 4x4 gauze, ABD pad, and matilde daily  - R foot wound- mupirocin BID     -Continue to offload pressure; bariatric low airloss support surface, turn and position per protocol with use of fluidized positioning devices, continue incontinence and moisture care per protocol and use of single breathable incontinence pads, continue to offload heels with fluidized positioning devices/Ochoa-Lock positioning device (PS# [id number]). Continue use of bariatric seat cushion (people soft #[id number]) and limit sit time- no more than 2 consecutive hours at any given time.   - Pressure injury/Skin: OOB to Chair, PT/OT    - Ammonium lactate to BL LEs   - VAC    #Neuropathy/bilateral foot drop  - Cymbalta 60mg daily   - Gabapentin 1200mg TID    -PRAFO in bed    #Pain Mgmt   - Capsaicin cream to BL feet QID - Avoid use on damaged, broken, or irritated skin. Avoid occlusive dressing or heat   - Lidocaine patch to BL thighs   - PRN:; Oxycodone and Tylenol 650mg QID     #Morbid obesity  - Most recent weight 394lbs (5/7)     #GI/Bowel Mgmt   - Pantoprazole  - Bisacodyl 10mg at HS   - Miralax   - Naloxegal 25mg daily     #/Bladder Mgmt   #ELLA  #Urinary Retention  - Renvela 800mg TID  - Cr improving   - Bladder scan TID  - Flomax 0.4mg at HS     #FEN --Morbid obesity (BMI > 40).  - Diet - Regular + Thins  [CCHO]    - MVI       # DVT:  - Arixaban    Labs:  CBC, CMP 5/12  ----------------------------------------------    ----------------------------------------------      -----------------------------------------------  OUTPATIENT/FOLLOW UP:    Your Primary Care Provider,   Phone: (   )    -  Fax: (   )    -  Follow Up Time: 1 week    [facility],   [address] [place] [address]  Phone: [phone]  Fax: (   )    -  Follow Up Time:    Dr. Waterhouse  Podiatry  [phone]  Within 1 week   -----------------------------------------------

## 2025-05-10 NOTE — PROGRESS NOTE ADULT - SUBJECTIVE AND OBJECTIVE BOX
ROS/Subjective:  Patient seen and examined at bedside this morning. Patient in bed in BNAD, no SOB, no n/v, no pain.   No fever or chest pain         Vital Signs Last 24 Hrs  T(C): 36.6 (10 May 2025 07:58), Max: 36.6 (10 May 2025 07:58)  T(F): 97.8 (10 May 2025 07:58), Max: 97.8 (10 May 2025 07:58)  HR: 89 (10 May 2025 07:58) (85 - 99)  BP: 126/80 (10 May 2025 07:58) (113/75 - 126/80)  BP(mean): --  RR: 16 (10 May 2025 07:58) (16 - 17)  SpO2: 99% (10 May 2025 07:58) (96% - 100%)    Parameters below as of 10 May 2025 07:58  Patient On (Oxygen Delivery Method): room air        EXAM   Gen -  Comfortable, at rest, but pain with left leg movement   HEENT - NCAT,  MMM  Neck - Supple  Pulm - Clear  Cardiovascular - RRR, S1S2  Abdomen - Soft, non tender, +BS  Extremities - chronic skin changes, (+)BLE edema.     Neuro-  AAO X 3, Speech is normal     Motor - BUE 5/5                    LEFT    LE - HF 1/5, KE 2/5 limited by bed size and body habitus, DF 1/5, PF 5/5                    RIGHT LE - HF 3/5, KE 2/5 limited by bed size and body habitus , DF 1/5, PF 5/5        Sensory - Decreased to light touch bilateral lower extremities   Psychiatric - Mood stable, Affect WNL          LABS:  LABS:      Ca    9.4        09 May 2025 06:51                              8.6    5.12  )-----------( 247      ( 08 May 2025 06:58 )             29.2     05-09    140  |  103  |  12  ----------------------------<  100[H]  3.6   |  32[H]  |  1.15    Ca    9.4      09 May 2025 06:51  Mg     1.7     05-09    TPro  7.4  /  Alb  2.3[L]  /  TBili  0.3  /  DBili  x   /  AST  17  /  ALT  17  /  AlkPhos  55  05-09      Urinalysis Basic - ( 09 May 2025 06:51 )    Color: x / Appearance: x / SG: x / pH: x  Gluc: 100 mg/dL / Ketone: x  / Bili: x / Urobili: x   Blood: x / Protein: x / Nitrite: x   Leuk Esterase: x / RBC: x / WBC x   Sq Epi: x / Non Sq Epi: x / Bacteria: x        < from: Xray Hip 1 View, Left (05.03.25 @ 13:18) >  PROCEDURE DATE:  05/03/2025    INTERPRETATION:  Radiographs of the LEFT hip  No fracture or dislocation.  Femoral acetabular joint space radiographically intact.  No gross soft tissue abnormality.  IMPRESSION:    No acute radiographic osseous pathology..  Please review  pelvic CT report 5/1/2025      MEDICATIONS  (STANDING):  amLODIPine   Tablet 10 milliGRAM(s) Oral daily  ammonium lactate 12% Lotion 1 Application(s) Topical two times a day  apixaban 5 milliGRAM(s) Oral two times a day  ascorbic acid 500 milliGRAM(s) Oral daily  bisacodyl 10 milliGRAM(s) Oral at bedtime  capsaicin 0.025% Cream 1 Application(s) Topical four times a day  celecoxib 200 milliGRAM(s) Oral every 12 hours  clotrimazole 1% Cream 1 Application(s) Topical <User Schedule>  Dakins Solution - 1/4 Strength 1 Application(s) Topical two times a day  dextrose 5%. 1000 milliLiter(s) (50 mL/Hr) IV Continuous <Continuous>  dextrose 5%. 1000 milliLiter(s) (100 mL/Hr) IV Continuous <Continuous>  dextrose 50% Injectable 25 Gram(s) IV Push once  dextrose 50% Injectable 12.5 Gram(s) IV Push once  dextrose 50% Injectable 25 Gram(s) IV Push once  DULoxetine 60 milliGRAM(s) Oral daily  gabapentin 1200 milliGRAM(s) Oral every 8 hours  glucagon  Injectable 1 milliGRAM(s) IntraMuscular once  influenza   Vaccine 0.5 milliLiter(s) IntraMuscular once  lidocaine   4% Patch 1 Patch Transdermal daily  lidocaine   4% Patch 1 Patch Transdermal daily  magnesium oxide 400 milliGRAM(s) Oral three times a day with meals  melatonin 9 milliGRAM(s) Oral at bedtime  multivitamin 1 Tablet(s) Oral daily  mupirocin 2% Ointment 1 Application(s) Topical two times a day  naloxegol 25 milliGRAM(s) Oral daily  pantoprazole    Tablet 40 milliGRAM(s) Oral before breakfast  piperacillin/tazobactam IVPB.. 3.375 Gram(s) IV Intermittent every 8 hours  polyethylene glycol 3350 17 Gram(s) Oral two times a day  povidone iodine 10% Solution 1 Application(s) Topical daily  QUEtiapine 25 milliGRAM(s) Oral at bedtime  sevelamer carbonate 800 milliGRAM(s) Oral three times a day with meals  silver nitrate Applicator 6 Application(s) Topical once  tamsulosin 0.8 milliGRAM(s) Oral at bedtime  zinc sulfate 220 milliGRAM(s) Oral daily    MEDICATIONS  (PRN):  albuterol/ipratropium for Nebulization 3 milliLiter(s) Nebulizer every 6 hours PRN Shortness of Breath and/or Wheezing  dextrose Oral Gel 15 Gram(s) Oral once PRN Blood Glucose LESS THAN 70 milliGRAM(s)/deciliter  oxyCODONE    IR 20 milliGRAM(s) Oral every 6 hours PRN Severe Pain (7 - 10)  oxyCODONE    IR 15 milliGRAM(s) Oral two times a day PRN Moderate Pain (4 - 6)  oxyCODONE    IR 10 milliGRAM(s) Oral two times a day PRN Mild Pain (1 - 3)

## 2025-05-11 PROCEDURE — 99232 SBSQ HOSP IP/OBS MODERATE 35: CPT

## 2025-05-11 RX ADMIN — LIDOCAINE HYDROCHLORIDE 1 PATCH: 20 JELLY TOPICAL at 18:06

## 2025-05-11 RX ADMIN — QUETIAPINE FUMARATE 25 MILLIGRAM(S): 25 TABLET ORAL at 22:06

## 2025-05-11 RX ADMIN — SEVELAMER HYDROCHLORIDE 800 MILLIGRAM(S): 800 TABLET ORAL at 17:33

## 2025-05-11 RX ADMIN — APIXABAN 5 MILLIGRAM(S): 2.5 TABLET, FILM COATED ORAL at 06:15

## 2025-05-11 RX ADMIN — OXYCODONE HYDROCHLORIDE 20 MILLIGRAM(S): 30 TABLET ORAL at 12:12

## 2025-05-11 RX ADMIN — Medication 25 GRAM(S): at 05:21

## 2025-05-11 RX ADMIN — Medication 1 APPLICATION(S): at 05:19

## 2025-05-11 RX ADMIN — SEVELAMER HYDROCHLORIDE 800 MILLIGRAM(S): 800 TABLET ORAL at 12:06

## 2025-05-11 RX ADMIN — Medication 1 TABLET(S): at 12:06

## 2025-05-11 RX ADMIN — AMLODIPINE BESYLATE 10 MILLIGRAM(S): 10 TABLET ORAL at 06:15

## 2025-05-11 RX ADMIN — LIDOCAINE HYDROCHLORIDE 1 PATCH: 20 JELLY TOPICAL at 12:07

## 2025-05-11 RX ADMIN — Medication 25 GRAM(S): at 17:38

## 2025-05-11 RX ADMIN — CLOTRIMAZOLE 1 APPLICATION(S): 1 CREAM TOPICAL at 12:14

## 2025-05-11 RX ADMIN — Medication 400 MILLIGRAM(S): at 08:52

## 2025-05-11 RX ADMIN — GABAPENTIN 1200 MILLIGRAM(S): 400 CAPSULE ORAL at 13:57

## 2025-05-11 RX ADMIN — GABAPENTIN 1200 MILLIGRAM(S): 400 CAPSULE ORAL at 06:14

## 2025-05-11 RX ADMIN — DULOXETINE 60 MILLIGRAM(S): 20 CAPSULE, DELAYED RELEASE ORAL at 12:06

## 2025-05-11 RX ADMIN — LIDOCAINE HYDROCHLORIDE 1 PATCH: 20 JELLY TOPICAL at 12:06

## 2025-05-11 RX ADMIN — Medication 1 APPLICATION(S): at 17:34

## 2025-05-11 RX ADMIN — Medication 400 MILLIGRAM(S): at 17:33

## 2025-05-11 RX ADMIN — SEVELAMER HYDROCHLORIDE 800 MILLIGRAM(S): 800 TABLET ORAL at 08:52

## 2025-05-11 RX ADMIN — Medication 40 MILLIGRAM(S): at 06:15

## 2025-05-11 RX ADMIN — SODIUM HYPOCHLORITE 1 APPLICATION(S): 0.12 SOLUTION TOPICAL at 17:38

## 2025-05-11 RX ADMIN — GABAPENTIN 1200 MILLIGRAM(S): 400 CAPSULE ORAL at 22:08

## 2025-05-11 RX ADMIN — Medication 400 MILLIGRAM(S): at 12:06

## 2025-05-11 RX ADMIN — CELECOXIB 200 MILLIGRAM(S): 50 CAPSULE ORAL at 06:14

## 2025-05-11 RX ADMIN — Medication 220 MILLIGRAM(S): at 12:13

## 2025-05-11 RX ADMIN — Medication 1 APPLICATION(S): at 12:14

## 2025-05-11 RX ADMIN — CELECOXIB 200 MILLIGRAM(S): 50 CAPSULE ORAL at 17:33

## 2025-05-11 RX ADMIN — TAMSULOSIN HYDROCHLORIDE 0.8 MILLIGRAM(S): 0.4 CAPSULE ORAL at 22:06

## 2025-05-11 RX ADMIN — MUPIROCIN CALCIUM 1 APPLICATION(S): 20 CREAM TOPICAL at 17:36

## 2025-05-11 RX ADMIN — Medication 1 APPLICATION(S): at 12:15

## 2025-05-11 RX ADMIN — APIXABAN 5 MILLIGRAM(S): 2.5 TABLET, FILM COATED ORAL at 17:33

## 2025-05-11 RX ADMIN — Medication 9 MILLIGRAM(S): at 22:08

## 2025-05-11 RX ADMIN — Medication 500 MILLIGRAM(S): at 12:06

## 2025-05-11 RX ADMIN — OXYCODONE HYDROCHLORIDE 20 MILLIGRAM(S): 30 TABLET ORAL at 05:21

## 2025-05-11 RX ADMIN — OXYCODONE HYDROCHLORIDE 20 MILLIGRAM(S): 30 TABLET ORAL at 13:15

## 2025-05-11 NOTE — PROGRESS NOTE ADULT - ASSESSMENT
39 year old male with PMH of morbid obesity, BEA on CPAP, pAFIB (not on AC), HTN, and BL papilledema attributed to pseudotumor cerebri; who initially presented to  on 2/24 with SOB and BL LE edema. Found to have URI with progressive SOB and multifocal PNA on imaging. His hospital course was complicated by worsening respiratory distress and increased 02 demands secondary to secretions (requiring multiple intubation attempts) and eventual MICU admission. While in MICU, he 02 requirements continued despite optimal vent management. Concern noted for progressive ARDS, unable to prone given morbid obesity, and ultimately cannulated for vvECMO on 2/25 and transferred to [facility] for further management on 2/26.  His hospital course at [facility] was significant for the following: diuretic and ABX management, s/p trache and PEG (placed on 3/7- PEG since removed), RCU admission, high grade fevers (secondary to panniculitis), progressively worsening sacral ulcer (likely osteomyelitis- s/p surgical debridement), BL foot wound (secondary to pressor use), neuropathy (secondary to critical illness polyneuropathy), ELLA (secondary to vancomycin toxicity and overdiuresis- since DCd- with improvement). Patient now admitted for a multidisciplinary rehab program. 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  (present on admission)   - Gait Instability, ADL impairments and Functional impairments: Continue Comprehensive Rehab Program of PT/OT  - 3 hours a day, 5 days a week   - PRN: Nebulizer QID     BEA on BIPAP  --via nasal canula qhs    #Sacral Osteomyelitis  - Zosyn TID - last dose evening of 5/11/25    #Paroxysmal AFIB  - Eliquis 5mg BID     #Calf Tenderness  - BL LE doppler negative     #L hip pain- improved  - Celebrex 200mg BID   - no acute findings on XR or CT   - L hip MRI (5/7) no significant findings     #HTN  - Amlodipine 10mg dialy     #Sleep/Mood  - Melatonin 9mg at HS   - Quetiapine 25mg at HS     #Skin  - LUQ surgical wound previous site of PEG tube and right groin- Clean wound and periwound skin with NS. Pat dry. Cover with small silicone foam with border. Change every other day or prn if soiled     Sacral/gluteal cleft to bilateral buttocks- Irrigate deep cavity and tunneling with Dakins solution 1/4 strength using peribottle or flushes, cleanse wound and satellite ulcerations to b/l buttocks with NS, Dry well. Apply Liquid barrier film to periwound skin. Apply TRIAD barrier paste to isolated ulcer overlying left buttock,  Apply Aquacel hydrofiber sheet to right buttock, pack sacral/gluteal cleft including deep tunnels with Dakins 1/4 strength moistened kerlix, leave at least 2" out at end to ensure full removal of packing with subsequent dressing changes. Cover entire area (sacrum and right buttock) with abdominal pad and tegaderm. Change twice a day and prn if soiled/compromised. Once dressing is in place and perineal care is provided, clean remaining skin with perineal spray, apply TRIAD moisture barrier cream to exposed skin every shift and prn.    Bilateral abdominal pannus, bilateral groin: Cleanse with luke-warm soap and water, dry well. Apply clotrimazole cream daily, followed by Interdry textile sheeting, under intertriginous folds leaving 2 inches exposed at ends to wick, remove to wash & dry affected area, then replace. Individual sheeting may be used for up to 5 days unless soiled. Inspect skin daily.     - L foot wound- betadine to followed by 4x4 gauze, ABD pad, and matilde daily  - R foot wound- mupirocin BID     -Continue to offload pressure; bariatric low airloss support surface, turn and position per protocol with use of fluidized positioning devices, continue incontinence and moisture care per protocol and use of single breathable incontinence pads, continue to offload heels with fluidized positioning devices/Ochoa-Lock positioning device (PS# [id number]). Continue use of bariatric seat cushion (people soft #[id number]) and limit sit time- no more than 2 consecutive hours at any given time.   - Pressure injury/Skin: OOB to Chair, PT/OT    - Ammonium lactate to BL LEs   - VAC    #Neuropathy/bilateral foot drop  - Cymbalta 60mg daily   - Gabapentin 1200mg TID    -PRAFO in bed    #Pain Mgmt   - Capsaicin cream to BL feet QID - Avoid use on damaged, broken, or irritated skin. Avoid occlusive dressing or heat   - Lidocaine patch to BL thighs   - PRN:; Oxycodone and Tylenol 650mg QID     #Morbid obesity  - Most recent weight 394lbs (5/7)     #GI/Bowel Mgmt   - Pantoprazole  - Bisacodyl 10mg at HS   - Miralax   - Naloxegal 25mg daily     #/Bladder Mgmt   #ELLA  #Urinary Retention  - Renvela 800mg TID  - Cr improving   - Bladder scan TID  - Flomax 0.4mg at HS     #FEN --Morbid obesity (BMI > 40).  - Diet - Regular + Thins  [CCHO]    - MVI       # DVT:  - Arixaban    Labs:  CBC, CMP 5/12  ----------------------------------------------    ----------------------------------------------      -----------------------------------------------  OUTPATIENT/FOLLOW UP:    Your Primary Care Provider,   Phone: (   )    -  Fax: (   )    -  Follow Up Time: 1 week    [facility],   [address] [place] [address]  Phone: [phone]  Fax: (   )    -  Follow Up Time:    Dr. Waterhouse  Podiatry  [phone]  Within 1 week   -----------------------------------------------

## 2025-05-11 NOTE — PROGRESS NOTE ADULT - SUBJECTIVE AND OBJECTIVE BOX
ROS/Subjective:  Patient seen and examined at bedside this morning. Patient in bed in NAD, no SOB, no n/v, no pain.   No fever or chest pain         Vital Signs Last 24 Hrs  T(C): 36.7 (10 May 2025 21:47), Max: 36.7 (10 May 2025 21:47)  T(F): 98.1 (10 May 2025 21:47), Max: 98.1 (10 May 2025 21:47)  HR: 100 (11 May 2025 06:13) (99 - 100)  BP: 135/84 (11 May 2025 06:13) (122/75 - 135/84)  BP(mean): --  RR: 16 (10 May 2025 21:47) (16 - 16)  SpO2: 96% (10 May 2025 21:47) (96% - 96%)    Parameters below as of 10 May 2025 21:47  Patient On (Oxygen Delivery Method): room air        EXAM   Gen -  Comfortable, at rest, but pain with left leg movement   HEENT - NCAT,  MMM  Neck - Supple  Pulm - Clear  Cardiovascular - RRR, S1S2  Abdomen - Soft, non tender, +BS. Obese  Extremities - chronic skin changes, (+)BLE edema.     Neuro-  AAO X 3, Speech is normal     Motor - BUE 5/5                    LEFT    LE - HF 1/5, KE 4/5 limited by bed size and body habitus, DF 1/5, PF 5/5                    RIGHT LE - HF 3/5, KE 4/5 limited by bed size and body habitus , DF 1/5, PF 5/5        Sensory - Decreased to light touch bilateral lower extremities   Psychiatric - Mood stable, Affect WNL          LABS:  LABS:  LABS:      Ca    9.4        09 May 2025 06:51                              8.6    5.12  )-----------( 247      ( 08 May 2025 06:58 )             29.2     05-09    140  |  103  |  12  ----------------------------<  100[H]  3.6   |  32[H]  |  1.15    Ca    9.4      09 May 2025 06:51  Mg     1.7     05-09    TPro  7.4  /  Alb  2.3[L]  /  TBili  0.3  /  DBili  x   /  AST  17  /  ALT  17  /  AlkPhos  55  05-09      Urinalysis Basic - ( 09 May 2025 06:51 )    Color: x / Appearance: x / SG: x / pH: x  Gluc: 100 mg/dL / Ketone: x  / Bili: x / Urobili: x   Blood: x / Protein: x / Nitrite: x   Leuk Esterase: x / RBC: x / WBC x   Sq Epi: x / Non Sq Epi: x / Bacteria: x        < from: Xray Hip 1 View, Left (05.03.25 @ 13:18) >  PROCEDURE DATE:  05/03/2025    INTERPRETATION:  Radiographs of the LEFT hip  No fracture or dislocation.  Femoral acetabular joint space radiographically intact.  No gross soft tissue abnormality.  IMPRESSION:    No acute radiographic osseous pathology..  Please review  pelvic CT report 5/1/2025      MEDICATIONS  (STANDING):  amLODIPine   Tablet 10 milliGRAM(s) Oral daily  ammonium lactate 12% Lotion 1 Application(s) Topical two times a day  apixaban 5 milliGRAM(s) Oral two times a day  ascorbic acid 500 milliGRAM(s) Oral daily  bisacodyl 10 milliGRAM(s) Oral at bedtime  capsaicin 0.025% Cream 1 Application(s) Topical four times a day  celecoxib 200 milliGRAM(s) Oral every 12 hours  clotrimazole 1% Cream 1 Application(s) Topical <User Schedule>  Dakins Solution - 1/4 Strength 1 Application(s) Topical two times a day  dextrose 5%. 1000 milliLiter(s) (50 mL/Hr) IV Continuous <Continuous>  dextrose 5%. 1000 milliLiter(s) (100 mL/Hr) IV Continuous <Continuous>  dextrose 50% Injectable 25 Gram(s) IV Push once  dextrose 50% Injectable 12.5 Gram(s) IV Push once  dextrose 50% Injectable 25 Gram(s) IV Push once  DULoxetine 60 milliGRAM(s) Oral daily  gabapentin 1200 milliGRAM(s) Oral every 8 hours  glucagon  Injectable 1 milliGRAM(s) IntraMuscular once  influenza   Vaccine 0.5 milliLiter(s) IntraMuscular once  lidocaine   4% Patch 1 Patch Transdermal daily  lidocaine   4% Patch 1 Patch Transdermal daily  magnesium oxide 400 milliGRAM(s) Oral three times a day with meals  melatonin 9 milliGRAM(s) Oral at bedtime  multivitamin 1 Tablet(s) Oral daily  mupirocin 2% Ointment 1 Application(s) Topical two times a day  naloxegol 25 milliGRAM(s) Oral daily  pantoprazole    Tablet 40 milliGRAM(s) Oral before breakfast  piperacillin/tazobactam IVPB.. 3.375 Gram(s) IV Intermittent every 8 hours  polyethylene glycol 3350 17 Gram(s) Oral two times a day  povidone iodine 10% Solution 1 Application(s) Topical daily  QUEtiapine 25 milliGRAM(s) Oral at bedtime  sevelamer carbonate 800 milliGRAM(s) Oral three times a day with meals  silver nitrate Applicator 6 Application(s) Topical once  tamsulosin 0.8 milliGRAM(s) Oral at bedtime  zinc sulfate 220 milliGRAM(s) Oral daily    MEDICATIONS  (PRN):  albuterol/ipratropium for Nebulization 3 milliLiter(s) Nebulizer every 6 hours PRN Shortness of Breath and/or Wheezing  dextrose Oral Gel 15 Gram(s) Oral once PRN Blood Glucose LESS THAN 70 milliGRAM(s)/deciliter  oxyCODONE    IR 20 milliGRAM(s) Oral every 6 hours PRN Severe Pain (7 - 10)  oxyCODONE    IR 15 milliGRAM(s) Oral two times a day PRN Moderate Pain (4 - 6)  oxyCODONE    IR 10 milliGRAM(s) Oral two times a day PRN Mild Pain (1 - 3)

## 2025-05-11 NOTE — PROGRESS NOTE ADULT - ASSESSMENT
40 y/o M with PMH of morbid obesity, BEA, pAFIB (not on AC), HTN, and BL papilledema attributed to pseudotumor cerebri; who initially presented to  on 2/24 with SOB and BL LE edema. Found to have URI with progressive SOB and multifocal PNA on imaging. His hospital course was complicated by worsening respiratory distress and increased O2 demands secondary to secretions (requiring multiple intubation attempts) and eventual MICU admission. While in MICU, he O2 requirements continued despite optimal vent management. Concern noted for progressive ARDS, unable to prone given morbid obesity, and ultimately cannulated for vvECMO on 2/25 and transferred to [facility] for further management on 2/26. His hospital course at [facility] was significant for the following: diuretic and ABX management, s/p trach and PEG (placed on 3/7- PEG since removed), RCU admission, high grade fevers (secondary to panniculitis), progressively worsening sacral ulcer (likely osteomyelitis- s/p surgical debridement), BL foot wound (secondary to pressor use), neuropathy (secondary to critical illness polyneuropathy), ELLA (secondary to vancomycin toxicity and overdiuresis- since DCd- with improvement). Patient now admitted for a multidisciplinary rehab program. 04-25-25 @ 15:19    Abnormal CT A/P:   - 5/2: CT A/P: Question of pneumatosis in the cecum with no other evidence of bowel ischemia. Correlation with lactate levels and clinical exam would be helpful.  - Patient has no symptoms. Abdomen is benign. Tolerating PO  - 5/2: Surgery cx noted: no intervention. c/w PO. refer to bariatric surgery post-DC dr botello at [place] or Dr. Segovia at [place] [per patient request]  - 5/3 GI cx noted: no GI intervention planned. c/w PO    left hip pain - Improving  - on celebrex. patient to avoid if pain is tolerable as risk of bleeding is high with eliquis  - hip xray--> no fracture  - CT hip: Again seen is a sacral decubitus wound which extends superficial as well as involving the right gluteus musculature and the posterior L5 with small amount of fluid and gas within the tract. Limited involvement on this noncontrast CT. This likely corresponds to adjacent phlegmonous changes  - MRI left hip: Incompletely characterized sacral wound with associated ill-defined collection of fluid and soft tissue gas which extends to/involve the right gluteus zurdo muscle as at CT pelvis study from one day prior. No interval change in the high STIR signal within the bilateral gluteal and thigh musculature suggestive of a myositis as compared to prior MRI study. No fracture, osseous destruction or aggressive periosteal reaction. No osteomyelitis at the left hip. Edema-like signal along the right and left greater trochanteric regions, overall increased as compared to the prior MRI study from 4/11/2025. Findings are felt to be reactive in etiology..  - pain control, PT  per primary    #Possible Sacral Osteomyelitis  - Continue Zosyn TID - last dose evening of 5/11/25  - ID noted    #Stage IV Sacral Decub Ulcer  #Bilateral Buttocks Wounds  #Bilateral Foot Wounds  -5/2 CT A/P: An air and fluid containing collection in the right gluteus zurdo muscle in continuity with a subcutaneous sacral collection. No obvious skin defect to suggest a decubitus ulcer.  -5/7: MRI left hip: Incompletely characterized sacral wound with associated ill-defined collection of fluid and soft tissue gas which extends to/involve the right gluteus zurdo muscle as at CT pelvis study from one day prior. No interval change in the high STIR signal within the bilateral gluteal and thigh musculature suggestive of a myositis as compared to prior MRI study. No fracture, osseous destruction or aggressive periosteal reaction. No osteomyelitis at the left hip. Edema-like signal along the right and left greater trochanteric regions, overall increased as compared to the prior MRI study from 4/11/2025. Findings are felt to be reactive in etiology..  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 RV is not well visualized, enlarged RV cavity size a/ reduced RV systolic function. No significant valvular disease.  No echocardiographic evidence of pulmonary hypertension.  -s/p Trach (decannulated 3/28) and PEG (removed 4/15)   -Saturating well on RA  -Continue duoneb PRN   -BIPAP QHS (FiO2 40%, 18/10) via nasal mask   -Fall precaution  -Outpatient pulm follow up     #Chronic AFIB  #Sinus Tachycardia  - Patient has intermittent tachycardia. Per patient, His HR amostly above 110 even when he is not sick. follows with cardio OP. not interested in rate control meds at this time.   - 5/3: EKG: NSR@94 bpm  - EKG 4/28 showed sinus tachycardia, no acute acute ST/T wave changes  - c/w Eliquis 5mg BID   - Not on AVN blocker   - TSH WNL 2/25  - Sinus tachycardia likely multifactorial including pain and deconditioning. Low suspicion for PE, no SOB/hypoxia and he is already on full AC    #RIJ and Bilateral LE DVT  -Duplex RUE 3/14 showed Acute, non-occlusive deep vein thrombosis noted within the right internal jugular vein. Superficial vein thrombosis noted within the right antecubital cephalic vein.  -Duplex LE 3/14 showed Acute, occlusive deep vein thromboses noted within the right and left peroneal veins at both mid calves. Age-indeterminate, occlusive deep vein thrombosis visualized within the left gastrocnemius vein at the proximal calf.  -Continue eliquis  -Repeat duplex 4/29 showed No evidence of deep venous thrombosis in either lower extremity.    #Normocytic Anemia   -Likely multifactorial  -H/H stable, no evidence of active bleed   -Monitor CBC     #HTN  -Continue Amlodipine  -Monitor BP     #Sleep/Mood  -Continue Melatonin  -Continue Quetiapine    #History of Pseudotumor Cerebri   -Outpatient follow up     #ELLA (Improved)  -Baseline Cr appears to be ~0.8 range   -ELLA felt to be multifactorial in setting of cardiorenal w/ RVE, recurrent ELLA suspected due to vancomycin toxicity and diuresis   -Continue to encourage oral hydration   -Continue Renvela 800mg TID, monitor phos level  -Low phos diet  -Monitor BMP    #Type 2 Diabetes  -HbA1C 6.7 on 2/25, Repeat HbA1C 4.9 on 4/28  -FS trend reviewed, has been within goal  -RAISS discontinued  -Monitor glucose on routine lab, controlled     #/Urinary Retention  -Flomax 5/1  -Monitor bladder scans     #DVT PPx - Eliquis  #GI PPx - PPI

## 2025-05-11 NOTE — PROGRESS NOTE ADULT - SUBJECTIVE AND OBJECTIVE BOX
Patient is a 39y old  Male who presents with a chief complaint of Debility secondary to acute hypoxic respiratory failure (09 May 2025 09:18)    Patient seen and examined at bedside. No new complaints. No acute events overnight.     ALLERGIES:  No Known Allergies    MEDICATIONS  (STANDING):  amLODIPine   Tablet 10 milliGRAM(s) Oral daily  ammonium lactate 12% Lotion 1 Application(s) Topical two times a day  apixaban 5 milliGRAM(s) Oral two times a day  ascorbic acid 500 milliGRAM(s) Oral daily  bisacodyl 10 milliGRAM(s) Oral at bedtime  capsaicin 0.025% Cream 1 Application(s) Topical four times a day  celecoxib 200 milliGRAM(s) Oral every 12 hours  clotrimazole 1% Cream 1 Application(s) Topical <User Schedule>  Dakins Solution - 1/4 Strength 1 Application(s) Topical two times a day  dextrose 5%. 1000 milliLiter(s) (100 mL/Hr) IV Continuous <Continuous>  dextrose 5%. 1000 milliLiter(s) (50 mL/Hr) IV Continuous <Continuous>  dextrose 50% Injectable 25 Gram(s) IV Push once  dextrose 50% Injectable 12.5 Gram(s) IV Push once  dextrose 50% Injectable 25 Gram(s) IV Push once  DULoxetine 60 milliGRAM(s) Oral daily  gabapentin 1200 milliGRAM(s) Oral every 8 hours  glucagon  Injectable 1 milliGRAM(s) IntraMuscular once  influenza   Vaccine 0.5 milliLiter(s) IntraMuscular once  lidocaine   4% Patch 1 Patch Transdermal daily  lidocaine   4% Patch 1 Patch Transdermal daily  magnesium oxide 400 milliGRAM(s) Oral three times a day with meals  melatonin 9 milliGRAM(s) Oral at bedtime  multivitamin 1 Tablet(s) Oral daily  mupirocin 2% Ointment 1 Application(s) Topical two times a day  naloxegol 25 milliGRAM(s) Oral daily  pantoprazole    Tablet 40 milliGRAM(s) Oral before breakfast  piperacillin/tazobactam IVPB.. 3.375 Gram(s) IV Intermittent every 8 hours  polyethylene glycol 3350 17 Gram(s) Oral two times a day  povidone iodine 10% Solution 1 Application(s) Topical daily  QUEtiapine 25 milliGRAM(s) Oral at bedtime  sevelamer carbonate 800 milliGRAM(s) Oral three times a day with meals  silver nitrate Applicator 6 Application(s) Topical once  tamsulosin 0.8 milliGRAM(s) Oral at bedtime  zinc sulfate 220 milliGRAM(s) Oral daily    MEDICATIONS  (PRN):  albuterol/ipratropium for Nebulization 3 milliLiter(s) Nebulizer every 6 hours PRN Shortness of Breath and/or Wheezing  dextrose Oral Gel 15 Gram(s) Oral once PRN Blood Glucose LESS THAN 70 milliGRAM(s)/deciliter  oxyCODONE    IR 20 milliGRAM(s) Oral every 6 hours PRN Severe Pain (7 - 10)  oxyCODONE    IR 15 milliGRAM(s) Oral two times a day PRN Moderate Pain (4 - 6)  oxyCODONE    IR 10 milliGRAM(s) Oral two times a day PRN Mild Pain (1 - 3)      T(C): 36.7 (05-10-25 @ 21:47), Max: 36.7 (05-10-25 @ 21:47)  T(F): 98.1 (05-10-25 @ 21:47), Max: 98.1 (05-10-25 @ 21:47)  HR: 100 (05-11-25 @ 06:13) (99 - 100)  BP: 135/84 (05-11-25 @ 06:13) (122/75 - 135/84)  ABP: --  ABP(mean): --  RR: 16 (05-10-25 @ 21:47) (16 - 16)  SpO2: 96% (05-10-25 @ 21:47) (96% - 96%)    PHYSICAL EXAM:  General: NAD, morbidly obese  ENT: MMM, no scleral icterus  Neck: Supple, No JVD  Lungs: Clear to auscultation bilaterally, no wheezes, rales, rhonchi  Cardio: RRR, S1/S2  Abdomen: Soft, Nontender, Nondistended; Bowel sounds present  Extremities: No calf tenderness, No pitting edema  Skin: b/l foot dressing, +wound vac     LABS:                        8.6    5.12  )-----------( 247      ( 08 May 2025 06:58 )             29.2       05-09    140  |  103  |  12  ----------------------------<  100  3.6   |  32  |  1.15    Ca    9.4      09 May 2025 06:51  Mg     1.7     05-09    TPro  7.4  /  Alb  2.3  /  TBili  0.3  /  DBili  x   /  AST  17  /  ALT  17  /  AlkPhos  55  05-09                03-08 Chol -- LDL -- HDL -- Trig 169 mg/dL                  Urinalysis Basic - ( 09 May 2025 06:51 )    Color: x / Appearance: x / SG: x / pH: x  Gluc: 100 mg/dL / Ketone: x  / Bili: x / Urobili: x   Blood: x / Protein: x / Nitrite: x   Leuk Esterase: x / RBC: x / WBC x   Sq Epi: x / Non Sq Epi: x / Bacteria: x        COVID-19 PCR: NotDetec (04-25-25 @ 18:31)      RADIOLOGY & ADDITIONAL TESTS:  < from: MR Hip No Cont, Left (05.07.25 @ 15:46) >  FINDINGS:  Similar findings of a large sacral wound with associated ill-defined   collection of fluid and gas as compared to CT pelvis from one day prior.   This collection extends into/involvement of the right gluteal zurdo   musculature. The collection is only partially characterized given there   is only visualized on the large field-of-view coronal images of the   pelvis.    Similar findings of high STIR signal involving the bilateral gluteal   musculature as well as the bilateral thigh musculature, specifically the   rectus femoris and vastus lateralis muscles as compared to the prior   examination.    There is advanced atrophy and edema within the bilateral quadratus   femoris muscles, similar to prior. Paraspinal muscle edema-like signal is   also noted in unchanged as compared to prior.    There are no advanced productive changes at the bilateral hips. Bilateral   hip joint effusions are noted as at prior MRI study.    No fracture or dislocation. No osseous destruction or aggressive   periosteal reaction. There is edema-like signal along the right and left   greater trochanteric regions, overall increased as compared to the prior   MRI study from 4/11/2025. No low T1 signal identified at the left hip to   suggest osteomyelitis.      IMPRESSION:  Incompletely characterized sacral wound with associated ill-defined   collection of fluid and soft tissue gas which extends to/involve the   right gluteus zurdo muscle as at CT pelvis study from one day prior.    No interval change in the high STIR signal within the bilateral gluteal   and thigh musculature suggestive of a myositis as compared to prior MRI   study.    No fracture, osseous destruction or aggressive periosteal reaction. No   osteomyelitis at the left hip.    Edema-like signal along the right and left greater trochanteric regions,   overall increased as compared to the prior MRI study from 4/11/2025.   Findings are felt to be reactive in etiology..    --- End of Report ---    < end of copied text >    Care Discussed with Consultants/Other Providers: yes, rehab

## 2025-05-12 LAB
ALBUMIN SERPL ELPH-MCNC: 2.3 G/DL — LOW (ref 3.3–5)
ALP SERPL-CCNC: 56 U/L — SIGNIFICANT CHANGE UP (ref 40–120)
ALT FLD-CCNC: 17 U/L — SIGNIFICANT CHANGE UP (ref 10–45)
ANION GAP SERPL CALC-SCNC: 6 MMOL/L — SIGNIFICANT CHANGE UP (ref 5–17)
AST SERPL-CCNC: 16 U/L — SIGNIFICANT CHANGE UP (ref 10–40)
BASOPHILS # BLD AUTO: 0.02 K/UL — SIGNIFICANT CHANGE UP (ref 0–0.2)
BASOPHILS NFR BLD AUTO: 0.4 % — SIGNIFICANT CHANGE UP (ref 0–2)
BILIRUB SERPL-MCNC: 0.3 MG/DL — SIGNIFICANT CHANGE UP (ref 0.2–1.2)
BUN SERPL-MCNC: 15 MG/DL — SIGNIFICANT CHANGE UP (ref 7–23)
CALCIUM SERPL-MCNC: 9.6 MG/DL — SIGNIFICANT CHANGE UP (ref 8.4–10.5)
CHLORIDE SERPL-SCNC: 104 MMOL/L — SIGNIFICANT CHANGE UP (ref 96–108)
CO2 SERPL-SCNC: 31 MMOL/L — SIGNIFICANT CHANGE UP (ref 22–31)
CREAT SERPL-MCNC: 1.06 MG/DL — SIGNIFICANT CHANGE UP (ref 0.5–1.3)
EGFR: 93 ML/MIN/1.73M2 — SIGNIFICANT CHANGE UP
EGFR: 93 ML/MIN/1.73M2 — SIGNIFICANT CHANGE UP
EOSINOPHIL # BLD AUTO: 0.24 K/UL — SIGNIFICANT CHANGE UP (ref 0–0.5)
EOSINOPHIL NFR BLD AUTO: 4.6 % — SIGNIFICANT CHANGE UP (ref 0–6)
GLUCOSE SERPL-MCNC: 89 MG/DL — SIGNIFICANT CHANGE UP (ref 70–99)
HCT VFR BLD CALC: 31.8 % — LOW (ref 39–50)
HGB BLD-MCNC: 9.1 G/DL — LOW (ref 13–17)
IMM GRANULOCYTES NFR BLD AUTO: 0.4 % — SIGNIFICANT CHANGE UP (ref 0–0.9)
LYMPHOCYTES # BLD AUTO: 1.64 K/UL — SIGNIFICANT CHANGE UP (ref 1–3.3)
LYMPHOCYTES # BLD AUTO: 31.5 % — SIGNIFICANT CHANGE UP (ref 13–44)
MCHC RBC-ENTMCNC: 24.8 PG — LOW (ref 27–34)
MCHC RBC-ENTMCNC: 28.6 G/DL — LOW (ref 32–36)
MCV RBC AUTO: 86.6 FL — SIGNIFICANT CHANGE UP (ref 80–100)
MONOCYTES # BLD AUTO: 0.63 K/UL — SIGNIFICANT CHANGE UP (ref 0–0.9)
MONOCYTES NFR BLD AUTO: 12.1 % — SIGNIFICANT CHANGE UP (ref 2–14)
NEUTROPHILS # BLD AUTO: 2.66 K/UL — SIGNIFICANT CHANGE UP (ref 1.8–7.4)
NEUTROPHILS NFR BLD AUTO: 51 % — SIGNIFICANT CHANGE UP (ref 43–77)
NRBC BLD AUTO-RTO: 0 /100 WBCS — SIGNIFICANT CHANGE UP (ref 0–0)
PLATELET # BLD AUTO: 395 K/UL — SIGNIFICANT CHANGE UP (ref 150–400)
POTASSIUM SERPL-MCNC: 3.8 MMOL/L — SIGNIFICANT CHANGE UP (ref 3.5–5.3)
POTASSIUM SERPL-SCNC: 3.8 MMOL/L — SIGNIFICANT CHANGE UP (ref 3.5–5.3)
PROT SERPL-MCNC: 7.4 G/DL — SIGNIFICANT CHANGE UP (ref 6–8.3)
RBC # BLD: 3.67 M/UL — LOW (ref 4.2–5.8)
RBC # FLD: 19.3 % — HIGH (ref 10.3–14.5)
SODIUM SERPL-SCNC: 141 MMOL/L — SIGNIFICANT CHANGE UP (ref 135–145)
WBC # BLD: 5.21 K/UL — SIGNIFICANT CHANGE UP (ref 3.8–10.5)
WBC # FLD AUTO: 5.21 K/UL — SIGNIFICANT CHANGE UP (ref 3.8–10.5)

## 2025-05-12 PROCEDURE — 99232 SBSQ HOSP IP/OBS MODERATE 35: CPT

## 2025-05-12 RX ADMIN — OXYCODONE HYDROCHLORIDE 20 MILLIGRAM(S): 30 TABLET ORAL at 08:41

## 2025-05-12 RX ADMIN — Medication 500 MILLIGRAM(S): at 12:38

## 2025-05-12 RX ADMIN — Medication 1 TABLET(S): at 12:38

## 2025-05-12 RX ADMIN — QUETIAPINE FUMARATE 25 MILLIGRAM(S): 25 TABLET ORAL at 22:40

## 2025-05-12 RX ADMIN — Medication 40 MILLIGRAM(S): at 05:15

## 2025-05-12 RX ADMIN — OXYCODONE HYDROCHLORIDE 20 MILLIGRAM(S): 30 TABLET ORAL at 09:41

## 2025-05-12 RX ADMIN — LIDOCAINE HYDROCHLORIDE 1 PATCH: 20 JELLY TOPICAL at 02:54

## 2025-05-12 RX ADMIN — SEVELAMER HYDROCHLORIDE 800 MILLIGRAM(S): 800 TABLET ORAL at 08:41

## 2025-05-12 RX ADMIN — CELECOXIB 200 MILLIGRAM(S): 50 CAPSULE ORAL at 05:15

## 2025-05-12 RX ADMIN — Medication 400 MILLIGRAM(S): at 12:38

## 2025-05-12 RX ADMIN — SEVELAMER HYDROCHLORIDE 800 MILLIGRAM(S): 800 TABLET ORAL at 12:39

## 2025-05-12 RX ADMIN — Medication 220 MILLIGRAM(S): at 15:57

## 2025-05-12 RX ADMIN — GABAPENTIN 1200 MILLIGRAM(S): 400 CAPSULE ORAL at 22:40

## 2025-05-12 RX ADMIN — SODIUM HYPOCHLORITE 1 APPLICATION(S): 0.12 SOLUTION TOPICAL at 17:43

## 2025-05-12 RX ADMIN — OXYCODONE HYDROCHLORIDE 15 MILLIGRAM(S): 30 TABLET ORAL at 15:56

## 2025-05-12 RX ADMIN — Medication 1 APPLICATION(S): at 05:16

## 2025-05-12 RX ADMIN — NALOXEGOL OXALATE 25 MILLIGRAM(S): 12.5 TABLET, FILM COATED ORAL at 12:39

## 2025-05-12 RX ADMIN — Medication 1 APPLICATION(S): at 17:46

## 2025-05-12 RX ADMIN — MUPIROCIN CALCIUM 1 APPLICATION(S): 20 CREAM TOPICAL at 17:43

## 2025-05-12 RX ADMIN — Medication 400 MILLIGRAM(S): at 08:42

## 2025-05-12 RX ADMIN — SEVELAMER HYDROCHLORIDE 800 MILLIGRAM(S): 800 TABLET ORAL at 17:46

## 2025-05-12 RX ADMIN — CELECOXIB 200 MILLIGRAM(S): 50 CAPSULE ORAL at 06:00

## 2025-05-12 RX ADMIN — APIXABAN 5 MILLIGRAM(S): 2.5 TABLET, FILM COATED ORAL at 05:15

## 2025-05-12 RX ADMIN — Medication 1 APPLICATION(S): at 12:37

## 2025-05-12 RX ADMIN — AMLODIPINE BESYLATE 10 MILLIGRAM(S): 10 TABLET ORAL at 05:15

## 2025-05-12 RX ADMIN — Medication 9 MILLIGRAM(S): at 21:11

## 2025-05-12 RX ADMIN — GABAPENTIN 1200 MILLIGRAM(S): 400 CAPSULE ORAL at 05:14

## 2025-05-12 RX ADMIN — OXYCODONE HYDROCHLORIDE 20 MILLIGRAM(S): 30 TABLET ORAL at 22:05

## 2025-05-12 RX ADMIN — OXYCODONE HYDROCHLORIDE 20 MILLIGRAM(S): 30 TABLET ORAL at 21:11

## 2025-05-12 RX ADMIN — DULOXETINE 60 MILLIGRAM(S): 20 CAPSULE, DELAYED RELEASE ORAL at 12:38

## 2025-05-12 RX ADMIN — OXYCODONE HYDROCHLORIDE 15 MILLIGRAM(S): 30 TABLET ORAL at 14:56

## 2025-05-12 RX ADMIN — MUPIROCIN CALCIUM 1 APPLICATION(S): 20 CREAM TOPICAL at 05:16

## 2025-05-12 RX ADMIN — CELECOXIB 200 MILLIGRAM(S): 50 CAPSULE ORAL at 17:46

## 2025-05-12 RX ADMIN — GABAPENTIN 1200 MILLIGRAM(S): 400 CAPSULE ORAL at 14:55

## 2025-05-12 RX ADMIN — APIXABAN 5 MILLIGRAM(S): 2.5 TABLET, FILM COATED ORAL at 17:46

## 2025-05-12 RX ADMIN — TAMSULOSIN HYDROCHLORIDE 0.8 MILLIGRAM(S): 0.4 CAPSULE ORAL at 21:09

## 2025-05-12 RX ADMIN — Medication 25 GRAM(S): at 01:38

## 2025-05-12 RX ADMIN — Medication 400 MILLIGRAM(S): at 17:45

## 2025-05-12 NOTE — PROGRESS NOTE ADULT - ASSESSMENT
39 year old male with PMH of morbid obesity, BEA on CPAP, pAFIB (not on AC), HTN, and BL papilledema attributed to pseudotumor cerebri; who initially presented to  on 2/24 with SOB and BL LE edema. Found to have URI with progressive SOB and multifocal PNA on imaging. His hospital course was complicated by worsening respiratory distress and increased 02 demands secondary to secretions (requiring multiple intubation attempts) and eventual MICU admission. While in MICU, he 02 requirements continued despite optimal vent management. Concern noted for progressive ARDS, unable to prone given morbid obesity, and ultimately cannulated for vvECMO on 2/25 and transferred to [facility] for further management on 2/26.  His hospital course at [facility] was significant for the following: diuretic and ABX management, s/p trache and PEG (placed on 3/7- PEG since removed), RCU admission, high grade fevers (secondary to panniculitis), progressively worsening sacral ulcer (likely osteomyelitis- s/p surgical debridement), BL foot wound (secondary to pressor use), neuropathy (secondary to critical illness polyneuropathy), ELLA (secondary to vancomycin toxicity and overdiuresis- since DCd- with improvement). Patient now admitted for a multidisciplinary rehab program. 04-25-25 @ 15:19    * Continue VAC dressing for sacral wound and Plastics f/u  * L hip pain -responsive to treatment   * Labs CBC unremarkable CMP awaited   * PRAFO boots at HS     # Critical illness myopathy due to respiratory failure and sepsis  (present on admission)   - Gait Instability, ADL impairments and Functional impairments: start Comprehensive Rehab Program of PT/OT  - 3 hours a day, 5 days a week   - PRN: Nebulizer QID     BEA on BIPAP  --via nasal canula qhs    #Sacral Osteomyelitis  - Zosyn TID - last dose evening of 5/11/25    #Paroxysmal AFIB  - Eliquis 5mg BID     #Calf Tenderness  - BL LE doppler negative     #L hip pain  - limiting therapy progress   - Celebrex 200mg BID   - no acute findings on XR or CT   - L hip MRI (5/7) no significant findings     #HTN  - Amlodipine 10mg dialy     #Sleep/Mood  - Melatonin 9mg at HS   - Quetiapine 25mg at HS     #Skin  - LUQ surgical wound previous site of PEG tube and right groin- Clean wound and periwound skin with NS. Pat dry. Cover with small silicone foam with border. Change every other day or prn if soiled     Sacral/gluteal cleft to bilateral buttocks- Irrigate deep cavity and tunneling with Dakins solution 1/4 strength using peribottle or flushes, cleanse wound and satellite ulcerations to b/l buttocks with NS, Dry well. Apply Liquid barrier film to periwound skin. Apply TRIAD barrier paste to isolated ulcer overlying left buttock,  Apply Aquacel hydrofiber sheet to right buttock, pack sacral/gluteal cleft including deep tunnels with Dakins 1/4 strength moistened kerlix, leave at least 2" out at end to ensure full removal of packing with subsequent dressing changes. Cover entire area (sacrum and right buttock) with abdominal pad and tegaderm. Change twice a day and prn if soiled/compromised. Once dressing is in place and perineal care is provided, clean remaining skin with perineal spray, apply TRIAD moisture barrier cream to exposed skin every shift and prn.    Bilateral abdominal pannus, bilateral groin: Cleanse with luke-warm soap and water, dry well. Apply clotrimazole cream daily, followed by Interdry textile sheeting, under intertriginous folds leaving 2 inches exposed at ends to wick, remove to wash & dry affected area, then replace. Individual sheeting may be used for up to 5 days unless soiled. Inspect skin daily.     - L foot wound- betadine to followed by 4x4 gauze, ABD pad, and matilde daily  - R foot wound- mupirocin BID     -Continue to offload pressure; bariatric low airloss support surface, turn and position per protocol with use of fluidized positioning devices, continue incontinence and moisture care per protocol and use of single breathable incontinence pads, continue to offload heels with fluidized positioning devices/Ochoa-Lock positioning device (PS# [id number]). Continue use of bariatric seat cushion (people soft #[id number]) and limit sit time- no more than 2 consecutive hours at any given time.   - Pressure injury/Skin: OOB to Chair, PT/OT    - Ammonium lactate to BL LEs     #Neuropathy  - Cymbalta 60mg daily   - Gabapentin 1200mg TID     #Pain Mgmt   - Capsaicin cream to BL feet QID - Avoid use on damaged, broken, or irritated skin. Avoid occlusive dressing or heat   - Lidocaine patch to BL thighs   - PRN:; Oxycodone and Tylenol 650mg QID     #Morbid obesity  - Most recent weight 394lbs (5/7)     #GI/Bowel Mgmt   - Pantoprazole  - Bisacodyl 10mg at HS   - Miralax   - Naloxegal 25mg daily     #/Bladder Mgmt   #ELLA  #Urinary Retention  - Renvela 800mg TID  - Cr improving   - Bladder scan TID  - Flomax 0.4mg at HS     #FEN --Morbid obesity (BMI > 40).  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 DYSONs Liaison with Family/Providers:    5/5--D/w Plastics team, they did some bedside debridement of bleeding areas on sacral wound.,They recommended to continue VAC dressing     5/8--Girlfriend present at bedside, participated during review,   Discussed treatment plan including MRI findings and treatment plan of increasing dose of celebrex    -----------------------------------------------  OUTPATIENT/FOLLOW UP:    Your Primary Care Provider,   Phone: (   )    -  Fax: (   )    -  Follow Up Time: 1 week    [facility] [place],   [address] [place] [address]  Phone: [phone]  Fax: (   )    -  Follow Up Time:    Dr. Waterhouse  Podiatry  [phone]  Within 1 week   -----------------------------------------------

## 2025-05-12 NOTE — PROGRESS NOTE ADULT - SUBJECTIVE AND OBJECTIVE BOX
Subjective  Patient seen and examined this AM.   Reports good night rest  Left hip pain is significantly reduced  Sacral wound vac in place.     ROS--no acute pain, no fever or chest pain   B/l leg and sacral ulcers  Left hip pain with movement  LBM 5/11    Function   Pt rolled from side-to-side multi times throughout session. Pt able to roll to L  side Sup with no increase in pain. Pt able to roll to R side with min A 2/2 pain  in L hip to don on Nette pad.  Pt Nette from bed to WC with an assist of 2 with assist for L LE and wound vac.    Pt performed 4 standing trials in // bars with a min/mod A x3. Pt able to stand  from 45-60". Pt able to ambulate 3-4 steps on 3rd trials with assist blocking  stance leg, Pt able to advance alternate LE with only VCs and a WC follow in //  bars.  Pt use hip extension sheet to assist with hip extension.  Pt required prolonged rest breaks in between each set 2/2 pain and fatigue    Pt assisted with scooting back into chair and Nette back from Wc to bed at end  of session to have would check by JAMILA Banerjee 2/2 possible leakage       Vital Signs Last 24 Hrs  T(C): 36.7 (12 May 2025 08:36), Max: 36.7 (11 May 2025 10:05)  T(F): 98.1 (12 May 2025 08:36), Max: 98.1 (11 May 2025 10:05)  HR: 109 (12 May 2025 08:36) (91 - 109)  BP: 142/89 (12 May 2025 08:36) (108/69 - 142/89)  RR: 16 (12 May 2025 08:36) (16 - 16)  SpO2: 95% (12 May 2025 08:36) (95% - 96%)  O2 Parameters below as of 12 May 2025 08:36  Patient On (Oxygen Delivery Method): room air        EXAM   Gen -  Comfortable,   HEENT - NAD  Neck - Supple, No limited ROM  Pulm - Clear  Cardiovascular - RRR, S1S2, No murmurs  Abdomen - Soft, non tender, +BS  Extremities - chronic skin changes consistent with elephantiasis, midline present RUE  Edema reducing     Neuro-  AAO X 3, Speech is normal     Motor - UE 5/5                    LEFT    LE - HF 1/5, KE 2/5 limited by bed size and body habitus, DF 1/5, PF 5/5--left hip/leg ROM limited by pain                     RIGHT LE - HF 3/5, KE 2/5 limited by bed size and body habitus , DF 1/5, PF 5/5        Sensory - Decreased to light touch bilateral lower extremities      Coordination - impaired lower extremities   Psychiatric - Mood stable, Affect WNL    Skin:  R foot plantar aspect lateral/distal side 7cm x 5.5cm ischemic wound from pressors  RLE 4th digit 1x1.5cm ischemic induced wound from pressors  RLE 5th digit 3x2cm ischemic induced wound from pressors  L foot plantar aspect lateral/distal side 6x7cm ischemic induced wound from pressor usage  LLE 4th digit 2x1cm ischemic induced wound from pressors  LLE 5th digit 2.5x3cm ischemic induced wound from pressors   R groin skin tear 1x1.5cm  Prior PEG insertion site 0.5x1cm   Former trach site 1x0.5cm  Unstageable pressure wound cocyx 7cmx4.5cm w/ 2.5cm depth  R buttock pressure induced wound semi contiguous with coccyx wound calling unstageable given prior debridement 8.5x5.5cm  L buttock 1.0x0.5cm stage 3 pressure injury  R buttock skin tear 3.5cm x 0.5cm and 1.5cmx0.5cm    MMS--antigravity upper extremities  LE limited by edema and debility,      RECENT LABS/IMAGING                        9.1    5.21  )-----------( 395      ( 12 May 2025 07:25 )             31.8     05-12    141  |  104  |  15  ----------------------------<  89  3.8   |  31  |  1.06    Ca    9.6      12 May 2025 07:25    TPro  7.4  /  Alb  2.3[L]  /  TBili  0.3  /  DBili  x   /  AST  16  /  ALT  17  /  AlkPhos  56  05-12      Urinalysis Basic - ( 12 May 2025 07:25 )    Color: x / Appearance: x / SG: x / pH: x  Gluc: 89 mg/dL / Ketone: x  / Bili: x / Urobili: x   Blood: x / Protein: x / Nitrite: x   Leuk Esterase: x / RBC: x / WBC x   Sq Epi: x / Non Sq Epi: x / Bacteria: x        MEDICATIONS  (STANDING):  amLODIPine   Tablet 10 milliGRAM(s) Oral daily  ammonium lactate 12% Lotion 1 Application(s) Topical two times a day  apixaban 5 milliGRAM(s) Oral two times a day  ascorbic acid 500 milliGRAM(s) Oral daily  bisacodyl 10 milliGRAM(s) Oral at bedtime  capsaicin 0.025% Cream 1 Application(s) Topical four times a day  celecoxib 200 milliGRAM(s) Oral every 12 hours  clotrimazole 1% Cream 1 Application(s) Topical <User Schedule>  Dakins Solution - 1/4 Strength 1 Application(s) Topical two times a day  dextrose 5%. 1000 milliLiter(s) (100 mL/Hr) IV Continuous <Continuous>  dextrose 5%. 1000 milliLiter(s) (50 mL/Hr) IV Continuous <Continuous>  dextrose 50% Injectable 25 Gram(s) IV Push once  dextrose 50% Injectable 12.5 Gram(s) IV Push once  dextrose 50% Injectable 25 Gram(s) IV Push once  DULoxetine 60 milliGRAM(s) Oral daily  gabapentin 1200 milliGRAM(s) Oral every 8 hours  glucagon  Injectable 1 milliGRAM(s) IntraMuscular once  influenza   Vaccine 0.5 milliLiter(s) IntraMuscular once  lidocaine   4% Patch 1 Patch Transdermal daily  lidocaine   4% Patch 1 Patch Transdermal daily  magnesium oxide 400 milliGRAM(s) Oral three times a day with meals  melatonin 9 milliGRAM(s) Oral at bedtime  multivitamin 1 Tablet(s) Oral daily  mupirocin 2% Ointment 1 Application(s) Topical two times a day  naloxegol 25 milliGRAM(s) Oral daily  pantoprazole    Tablet 40 milliGRAM(s) Oral before breakfast  piperacillin/tazobactam IVPB.. 3.375 Gram(s) IV Intermittent every 8 hours  polyethylene glycol 3350 17 Gram(s) Oral two times a day  povidone iodine 10% Solution 1 Application(s) Topical daily  QUEtiapine 25 milliGRAM(s) Oral at bedtime  sevelamer carbonate 800 milliGRAM(s) Oral three times a day with meals  silver nitrate Applicator 6 Application(s) Topical once  tamsulosin 0.8 milliGRAM(s) Oral at bedtime  zinc sulfate 220 milliGRAM(s) Oral daily    MEDICATIONS  (PRN):  albuterol/ipratropium for Nebulization 3 milliLiter(s) Nebulizer every 6 hours PRN Shortness of Breath and/or Wheezing  dextrose Oral Gel 15 Gram(s) Oral once PRN Blood Glucose LESS THAN 70 milliGRAM(s)/deciliter  oxyCODONE    IR 20 milliGRAM(s) Oral every 6 hours PRN Severe Pain (7 - 10)  oxyCODONE    IR 15 milliGRAM(s) Oral two times a day PRN Moderate Pain (4 - 6)  oxyCODONE    IR 10 milliGRAM(s) Oral two times a day PRN Mild Pain (1 - 3)

## 2025-05-13 PROCEDURE — 99232 SBSQ HOSP IP/OBS MODERATE 35: CPT

## 2025-05-13 PROCEDURE — 99233 SBSQ HOSP IP/OBS HIGH 50: CPT

## 2025-05-13 RX ADMIN — GABAPENTIN 1200 MILLIGRAM(S): 400 CAPSULE ORAL at 13:26

## 2025-05-13 RX ADMIN — Medication 400 MILLIGRAM(S): at 11:34

## 2025-05-13 RX ADMIN — Medication 1 APPLICATION(S): at 11:37

## 2025-05-13 RX ADMIN — CELECOXIB 200 MILLIGRAM(S): 50 CAPSULE ORAL at 17:49

## 2025-05-13 RX ADMIN — MUPIROCIN CALCIUM 1 APPLICATION(S): 20 CREAM TOPICAL at 06:01

## 2025-05-13 RX ADMIN — QUETIAPINE FUMARATE 25 MILLIGRAM(S): 25 TABLET ORAL at 21:32

## 2025-05-13 RX ADMIN — Medication 220 MILLIGRAM(S): at 11:33

## 2025-05-13 RX ADMIN — APIXABAN 5 MILLIGRAM(S): 2.5 TABLET, FILM COATED ORAL at 06:01

## 2025-05-13 RX ADMIN — AMLODIPINE BESYLATE 10 MILLIGRAM(S): 10 TABLET ORAL at 06:00

## 2025-05-13 RX ADMIN — OXYCODONE HYDROCHLORIDE 20 MILLIGRAM(S): 30 TABLET ORAL at 21:31

## 2025-05-13 RX ADMIN — Medication 1 APPLICATION(S): at 11:33

## 2025-05-13 RX ADMIN — OXYCODONE HYDROCHLORIDE 20 MILLIGRAM(S): 30 TABLET ORAL at 22:31

## 2025-05-13 RX ADMIN — TAMSULOSIN HYDROCHLORIDE 0.8 MILLIGRAM(S): 0.4 CAPSULE ORAL at 21:31

## 2025-05-13 RX ADMIN — CLOTRIMAZOLE 1 APPLICATION(S): 1 CREAM TOPICAL at 11:33

## 2025-05-13 RX ADMIN — GABAPENTIN 1200 MILLIGRAM(S): 400 CAPSULE ORAL at 21:31

## 2025-05-13 RX ADMIN — Medication 400 MILLIGRAM(S): at 07:45

## 2025-05-13 RX ADMIN — Medication 500 MILLIGRAM(S): at 11:33

## 2025-05-13 RX ADMIN — CELECOXIB 200 MILLIGRAM(S): 50 CAPSULE ORAL at 06:00

## 2025-05-13 RX ADMIN — DULOXETINE 60 MILLIGRAM(S): 20 CAPSULE, DELAYED RELEASE ORAL at 11:34

## 2025-05-13 RX ADMIN — Medication 400 MILLIGRAM(S): at 17:49

## 2025-05-13 RX ADMIN — SODIUM HYPOCHLORITE 1 APPLICATION(S): 0.12 SOLUTION TOPICAL at 17:24

## 2025-05-13 RX ADMIN — Medication 1 TABLET(S): at 11:33

## 2025-05-13 RX ADMIN — SEVELAMER HYDROCHLORIDE 800 MILLIGRAM(S): 800 TABLET ORAL at 17:48

## 2025-05-13 RX ADMIN — NALOXEGOL OXALATE 25 MILLIGRAM(S): 12.5 TABLET, FILM COATED ORAL at 11:33

## 2025-05-13 RX ADMIN — SEVELAMER HYDROCHLORIDE 800 MILLIGRAM(S): 800 TABLET ORAL at 11:33

## 2025-05-13 RX ADMIN — Medication 40 MILLIGRAM(S): at 06:43

## 2025-05-13 RX ADMIN — Medication 9 MILLIGRAM(S): at 21:31

## 2025-05-13 RX ADMIN — Medication 1 APPLICATION(S): at 06:01

## 2025-05-13 RX ADMIN — OXYCODONE HYDROCHLORIDE 10 MILLIGRAM(S): 30 TABLET ORAL at 06:03

## 2025-05-13 RX ADMIN — APIXABAN 5 MILLIGRAM(S): 2.5 TABLET, FILM COATED ORAL at 17:49

## 2025-05-13 RX ADMIN — GABAPENTIN 1200 MILLIGRAM(S): 400 CAPSULE ORAL at 06:54

## 2025-05-13 RX ADMIN — OXYCODONE HYDROCHLORIDE 15 MILLIGRAM(S): 30 TABLET ORAL at 18:19

## 2025-05-13 RX ADMIN — OXYCODONE HYDROCHLORIDE 15 MILLIGRAM(S): 30 TABLET ORAL at 17:48

## 2025-05-13 RX ADMIN — SEVELAMER HYDROCHLORIDE 800 MILLIGRAM(S): 800 TABLET ORAL at 07:45

## 2025-05-13 RX ADMIN — CELECOXIB 200 MILLIGRAM(S): 50 CAPSULE ORAL at 18:19

## 2025-05-13 RX ADMIN — Medication 1 APPLICATION(S): at 17:45

## 2025-05-13 NOTE — PROGRESS NOTE ADULT - ASSESSMENT
39 year old male with PMH of morbid obesity, BEA, pAFIB (not on AC), HTN, and BL papilledema attributed to pseudotumor cerebri; who initially presented to  on 2/24 with SOB and BL LE edema. Found to have URI with progressive SOB and multifocal PNA on imaging. His hospital course was complicated by worsening respiratory distress and increased 02 demands secondary to secretions (requiring multiple intubation attempts) and eventual MICU admission. While in MICU, he 02 requirements continued despite optimal vent management. Concern noted for progressive ARDS, unable to prone given morbid obesity, and ultimately cannulated for vvECMO on 2/25 and transferred to [facility] for further management on 2/26. His hospital course at [facility] was significant for the following: diuretic and ABX management, s/p trach and PEG (placed on 3/7- PEG since removed), RCU admission, high grade fevers (secondary to panniculitis), progressively worsening sacral ulcer (likely osteomyelitis- s/p surgical debridement), BL foot wound (secondary to pressor use), neuropathy (secondary to critical illness polyneuropathy), ELLA (secondary to vancomycin toxicity and overdiuresis- since DCd- with improvement). Patient now admitted for a multidisciplinary rehab program. 04-25-25     # left hip pain - Improving  - on celebrex. patient to avoid if pain is tolerable as risk of bleeding is high with Eliquis  - hip xray--> no fracture  - CT hip: Again seen is a sacral decubitus wound which extends superficial as well as involving the right gluteus musculature and the posterior L5 with small amount of fluid and gas within the tract. Limited involvement on this noncontrast CT. This likely corresponds to adjacent phlegmonous changes  - MRI left hip: Incompletely characterized sacral wound with associated ill-defined collection of fluid and soft tissue gas which extends to/involve the right gluteus zurdo muscle as at CT pelvis study from one day prior. No interval change in the high STIR signal within the bilateral gluteal and thigh musculature suggestive of a myositis as compared to prior MRI study. No fracture, osseous destruction or aggressive periosteal reaction. No osteomyelitis at the left hip. Edema-like signal along the right and left greater trochanteric regions, overall increased as compared to the prior MRI study from 4/11/2025. Findings are felt to be reactive in etiology..  - pain control  - pt/ot    # Abnormal CT A/P:   - 5/2: CT A/P: Question of pneumatosis in the cecum with no other evidence of bowel ischemia. Correlation with lactate levels and clinical exam would be helpful.  - Patient has no symptoms. Abdomen is benign. Tolerating PO  - 5/2: Surgery cx noted: no intervention. c/w PO. refer to bariatric surgery post-DC dr botello at [place] or Dr. Segovia at [place] [per patient request]  - 5/3 GI cx noted: no GI intervention planned. c/w PO    #Possible Sacral Osteomyelitis  - Continue Zosyn TID - last dose evening of 5/11/25  - ID noted    #Stage IV Sacral Decub Ulcer  #Bilateral Buttocks Wounds  #Bilateral Foot Wounds  -5/2 CT A/P: An air and fluid containing collection in the right gluteus zurdo muscle in continuity with a subcutaneous sacral collection. No obvious skin defect to suggest a decubitus ulcer.  -5/7: MRI left hip: Incompletely characterized sacral wound with associated ill-defined collection of fluid and soft tissue gas which extends to/involve the right gluteus zurdo muscle as at CT pelvis study from one day prior. No interval change in the high STIR signal within the bilateral gluteal and thigh musculature suggestive of a myositis as compared to prior MRI study. No fracture, osseous destruction or aggressive periosteal reaction. No osteomyelitis at the left hip. Edema-like signal along the right and left greater trochanteric regions, overall increased as compared to the prior MRI study from 4/11/2025. Findings are felt to be reactive in etiology..  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 RV is not well visualized, enlarged RV cavity size a/ reduced RV systolic function. No significant valvular disease.  No echocardiographic evidence of pulmonary hypertension.  -s/p Trach (decannulated 3/28) and PEG (removed 4/15)   -Saturating well on RA  -Continue duoneb PRN   -BIPAP QHS (FiO2 40%, 18/10) via nasal mask   -Fall precaution  -Outpatient pulm follow up     #Chronic AFIB  #Sinus Tachycardia  - Patient has intermittent tachycardia. Per patient, His HR  mostly above 110 even when he is not sick. follows with cardio OP. not interested in rate control meds at this time.   - 5/3: EKG: NSR@94 bpm  - c/w Eliquis   - TSH WNL 2/25  - Sinus tachycardia likely multifactorial including pain and deconditioning. Low suspicion for PE, no SOB/hypoxia and he is already on full AC    #RIJ and Bilateral LE DVT  -Duplex RUE 3/14 showed Acute, non-occlusive deep vein thrombosis noted within the right internal jugular vein. Superficial vein thrombosis noted within the right antecubital cephalic vein.  -Duplex LE 3/14 showed Acute, occlusive deep vein thromboses noted within the right and left peroneal veins at both mid calves. Age-indeterminate, occlusive deep vein thrombosis visualized within the left gastrocnemius vein at the proximal calf.  -Continue eliquis  -Repeat duplex 4/29 showed No evidence of deep venous thrombosis in either lower extremity.    #Normocytic Anemia   -Likely multifactorial  -H/H stable, no evidence of active bleed   -Monitor CBC     #HTN  -Continue Amlodipine  -Monitor BP     #Sleep/Mood  -Continue Melatonin  -Continue Quetiapine    #History of Pseudotumor Cerebri   -Outpatient follow up     #ELLA (Improved)  -Baseline Cr appears to be ~0.8 range   -ELLA felt to be multifactorial in setting of cardiorenal w/ RVE, recurrent ELLA suspected due to vancomycin toxicity and diuresis   -Continue to encourage oral hydration   -Continue Renvela   -Low phos diet  -Monitor BMP    #Type 2 Diabetes  -HbA1C 6.7 on 2/25, Repeat HbA1C 4.9 on 4/28  -FS trend reviewed, has been within goal  -RAISS discontinued  -Monitor glucose on routine lab, controlled     #/Urinary Retention  -Flomax 5/1  -Monitor bladder scans     #DVT PPx - Eliquis  #GI PPx - PPI    will follow  d/w rehab team

## 2025-05-13 NOTE — PROGRESS NOTE ADULT - SUBJECTIVE AND OBJECTIVE BOX
39 year old male with PMH of morbid obesity, BEA, pAFIB (not on AC), HTN, and BL papilledema attributed to pseudotumor cerebri; who initially presented to  on 2/24 with SOB and BL LE edema. Found to have URI with progressive SOB and multifocal PNA on imaging. His hospital course was complicated by worsening respiratory distress and increased 02 demands secondary to secretions (requiring multiple intubation attempts) and eventual MICU admission. While in MICU, he 02 requirements continued despite optimal vent management. Concern noted for progressive ARDS, unable to prone given morbid obesity, and ultimately cannulated for vvECMO on 2/25 and transferred to [facility] for further management on 2/26. His hospital course at [facility] was significant for the following: diuretic and ABX management, s/p trach and PEG (placed on 3/7- PEG since removed), RCU admission, high grade fevers (secondary to panniculitis), progressively worsening sacral ulcer (likely osteomyelitis- s/p surgical debridement), BL foot wound (secondary to pressor use), neuropathy (secondary to critical illness polyneuropathy), ELLA (secondary to vancomycin toxicity and overdiuresis- since DCd- with improvement)    Patient seen and examined at bedside. No acute events noted.  no new complaints, denies n/v, headache, sob.      Vital Signs Last 24 Hrs  T(C): 36.9 (13 May 2025 07:46), Max: 36.9 (13 May 2025 07:46)  T(F): 98.4 (13 May 2025 07:46), Max: 98.4 (13 May 2025 07:46)  HR: 83 (13 May 2025 07:46) (81 - 102)  BP: 118/76 (13 May 2025 07:46) (118/76 - 148/78)  BP(mean): --  RR: 16 (13 May 2025 07:46) (16 - 16)  SpO2: 95% (13 May 2025 07:46) (95% - 98%)    Parameters below as of 13 May 2025 07:46  Patient On (Oxygen Delivery Method): room air        GENERAL- NAD  EAR/NOSE/MOUTH/THROAT -  MMM  EYES- MOLLY, conjunctiva and Sclera clear  NECK- supple  RESPIRATORY-  clear to auscultation bilaterally  CARDIOVASCULAR - SIS2, RRR  GI - soft NT BS present  EXTREMITIES- LE edema  NEUROLOGY-  b/l LE weakness   PSYCHIATRY- AAO X 3                  9.1                  141  | 31   | 15           5.21  >-----------< 395     ------------------------< 89                    31.8                 3.8  | 104  | 1.06                                         Ca 9.6   Mg x     Ph x        MEDICATIONS  (STANDING):  amLODIPine   Tablet 10 milliGRAM(s) Oral daily  ammonium lactate 12% Lotion 1 Application(s) Topical two times a day  apixaban 5 milliGRAM(s) Oral two times a day  ascorbic acid 500 milliGRAM(s) Oral daily  bisacodyl 10 milliGRAM(s) Oral at bedtime  capsaicin 0.025% Cream 1 Application(s) Topical four times a day  celecoxib 200 milliGRAM(s) Oral every 12 hours  clotrimazole 1% Cream 1 Application(s) Topical <User Schedule>  Dakins Solution - 1/4 Strength 1 Application(s) Topical two times a day  DULoxetine 60 milliGRAM(s) Oral daily  gabapentin 1200 milliGRAM(s) Oral every 8 hours  glucagon  Injectable 1 milliGRAM(s) IntraMuscular once  influenza   Vaccine 0.5 milliLiter(s) IntraMuscular once  lidocaine   4% Patch 1 Patch Transdermal daily  lidocaine   4% Patch 1 Patch Transdermal daily  magnesium oxide 400 milliGRAM(s) Oral three times a day with meals  melatonin 9 milliGRAM(s) Oral at bedtime  multivitamin 1 Tablet(s) Oral daily  mupirocin 2% Ointment 1 Application(s) Topical two times a day  naloxegol 25 milliGRAM(s) Oral daily  pantoprazole    Tablet 40 milliGRAM(s) Oral before breakfast  polyethylene glycol 3350 17 Gram(s) Oral two times a day  povidone iodine 10% Solution 1 Application(s) Topical daily  QUEtiapine 25 milliGRAM(s) Oral at bedtime  sevelamer carbonate 800 milliGRAM(s) Oral three times a day with meals  silver nitrate Applicator 6 Application(s) Topical once  tamsulosin 0.8 milliGRAM(s) Oral at bedtime  zinc sulfate 220 milliGRAM(s) Oral daily    MEDICATIONS  (PRN):  albuterol/ipratropium for Nebulization 3 milliLiter(s) Nebulizer every 6 hours PRN Shortness of Breath and/or Wheezing  dextrose Oral Gel 15 Gram(s) Oral once PRN Blood Glucose LESS THAN 70 milliGRAM(s)/deciliter  oxyCODONE    IR 20 milliGRAM(s) Oral every 6 hours PRN Severe Pain (7 - 10)  oxyCODONE    IR 15 milliGRAM(s) Oral two times a day PRN Moderate Pain (4 - 6)  oxyCODONE    IR 10 milliGRAM(s) Oral two times a day PRN Mild Pain (1 - 3)

## 2025-05-13 NOTE — PROGRESS NOTE ADULT - ASSESSMENT
39 year old male with PMH of morbid obesity, BEA on CPAP, pAFIB (not on AC), HTN, and BL papilledema attributed to pseudotumor cerebri; who initially presented to  on 2/24 with SOB and BL LE edema. Found to have URI with progressive SOB and multifocal PNA on imaging. His hospital course was complicated by worsening respiratory distress and increased 02 demands secondary to secretions (requiring multiple intubation attempts) and eventual MICU admission. While in MICU, he 02 requirements continued despite optimal vent management. Concern noted for progressive ARDS, unable to prone given morbid obesity, and ultimately cannulated for vvECMO on 2/25 and transferred to [facility] for further management on 2/26.  His hospital course at [facility] was significant for the following: diuretic and ABX management, s/p trache and PEG (placed on 3/7- PEG since removed), RCU admission, high grade fevers (secondary to panniculitis), progressively worsening sacral ulcer (likely osteomyelitis- s/p surgical debridement), BL foot wound (secondary to pressor use), neuropathy (secondary to critical illness polyneuropathy), ELLA (secondary to vancomycin toxicity and overdiuresis- since DCd- with improvement). Patient now admitted for a multidisciplinary rehab program. 04-25-25 @ 15:19    * Continue VAC dressing for sacral wound and Plastics f/u  * L hip pain -responsive to Celebrex   * PRAFO boots at HS     # Critical illness myopathy due to respiratory failure and sepsis  (present on admission)   - Gait Instability, ADL impairments and Functional impairments: start Comprehensive Rehab Program of PT/OT  - 3 hours a day, 5 days a week   - PRN: Nebulizer QID     BEA on BIPAP  --via nasal canula qhs    #Sacral Osteomyelitis  - Zosyn TID - completed on 5/12/25    #Paroxysmal AFIB  - Eliquis 5mg BID     #Calf Tenderness  - BL LE doppler negative     #L hip pain  - limiting therapy progress   - Celebrex 200mg BID   - no acute findings on XR or CT   - L hip MRI (5/7) no significant findings     #HTN  - Amlodipine 10mg dialy     #Sleep/Mood  - Melatonin 9mg at HS   - Quetiapine 25mg at HS     #Skin  - LUQ surgical wound previous site of PEG tube and right groin- Clean wound and periwound skin with NS. Pat dry. Cover with small silicone foam with border. Change every other day or prn if soiled     Sacral/gluteal cleft to bilateral buttocks- Irrigate deep cavity and tunneling with Dakins solution 1/4 strength using peribottle or flushes, cleanse wound and satellite ulcerations to b/l buttocks with NS, Dry well. Apply Liquid barrier film to periwound skin. Apply TRIAD barrier paste to isolated ulcer overlying left buttock,  Apply Aquacel hydrofiber sheet to right buttock, pack sacral/gluteal cleft including deep tunnels with Dakins 1/4 strength moistened kerlix, leave at least 2" out at end to ensure full removal of packing with subsequent dressing changes. Cover entire area (sacrum and right buttock) with abdominal pad and tegaderm. Change twice a day and prn if soiled/compromised. Once dressing is in place and perineal care is provided, clean remaining skin with perineal spray, apply TRIAD moisture barrier cream to exposed skin every shift and prn.    Bilateral abdominal pannus, bilateral groin: Cleanse with luke-warm soap and water, dry well. Apply clotrimazole cream daily, followed by Interdry textile sheeting, under intertriginous folds leaving 2 inches exposed at ends to wick, remove to wash & dry affected area, then replace. Individual sheeting may be used for up to 5 days unless soiled. Inspect skin daily.     - L foot wound- betadine to followed by 4x4 gauze, ABD pad, and matilde daily  - R foot wound- mupirocin BID     -Continue to offload pressure; bariatric low airloss support surface, turn and position per protocol with use of fluidized positioning devices, continue incontinence and moisture care per protocol and use of single breathable incontinence pads, continue to offload heels with fluidized positioning devices/Ochoa-Lock positioning device (PS# [id number]). Continue use of bariatric seat cushion (people soft #[id number]) and limit sit time- no more than 2 consecutive hours at any given time.   - Pressure injury/Skin: OOB to Chair, PT/OT    - Ammonium lactate to BL LEs     #Neuropathy  - Cymbalta 60mg daily   - Gabapentin 1200mg TID     #Pain Mgmt   - Capsaicin cream to BL feet QID - Avoid use on damaged, broken, or irritated skin. Avoid occlusive dressing or heat   - Lidocaine patch to BL thighs   - PRN:; Oxycodone and Tylenol 650mg QID     #Morbid obesity  - Most recent weight 394lbs (5/7)     #GI/Bowel Mgmt   - Pantoprazole  - Bisacodyl 10mg at HS   - Miralax   - Naloxegal 25mg daily     #/Bladder Mgmt   #ELLA  #Urinary Retention  - Renvela 800mg TID  - Cr improving   - Bladder scan TID  - Flomax 0.4mg at HS     #FEN --Morbid obesity (BMI > 40).  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 CANTU's Liaison with Family/Providers:    5/5--D/w Plastics team, they did some bedside debridement of bleeding areas on sacral wound.,They recommended to continue VAC dressing     5/8--Girlfriend present at bedside, participated during review,   Discussed treatment plan including MRI findings and treatment plan of increasing dose of celebrex    -----------------------------------------------  OUTPATIENT/FOLLOW UP:    Your Primary Care Provider,   Phone: (   )    -  Fax: (   )    -  Follow Up Time: 1 week    [facility] [place],   [address] [place] [address]  Phone: [phone]  Fax: (   )    -  Follow Up Time:    Dr. Waterhouse  Podiatry  [phone]  Within 1 week   -----------------------------------------------    39 year old male with PMH of morbid obesity, BEA on CPAP, pAFIB (not on AC), HTN, and BL papilledema attributed to pseudotumor cerebri; who initially presented to  on 2/24 with SOB and BL LE edema. Found to have URI with progressive SOB and multifocal PNA on imaging. His hospital course was complicated by worsening respiratory distress and increased 02 demands secondary to secretions (requiring multiple intubation attempts) and eventual MICU admission. While in MICU, he 02 requirements continued despite optimal vent management. Concern noted for progressive ARDS, unable to prone given morbid obesity, and ultimately cannulated for vvECMO on 2/25 and transferred to [facility] for further management on 2/26.  His hospital course at [facility] was significant for the following: diuretic and ABX management, s/p trache and PEG (placed on 3/7- PEG since removed), RCU admission, high grade fevers (secondary to panniculitis), progressively worsening sacral ulcer (likely osteomyelitis- s/p surgical debridement), BL foot wound (secondary to pressor use), neuropathy (secondary to critical illness polyneuropathy), ELLA (secondary to vancomycin toxicity and overdiuresis- since DCd- with improvement). Patient now admitted for a multidisciplinary rehab program. 04-25-25 @ 15:19    * Plastic team reconsulted for review VAC dressing considering leakage from sides and interruption of his therapy schedule   * L hip pain responsive to treatment-- Celebrex, will gradually taper and dc within few days  * IDT today, discussed progress: note to be making significant functional progress with control of left hip pain (transferred bed to  via bussy board today)     # Critical illness myopathy due to respiratory failure and sepsis  (present on admission)   - Gait Instability, ADL impairments and Functional impairments: start Comprehensive Rehab Program of PT/OT  - 3 hours a day, 5 days a week   - PRN: Nebulizer QID     BEA on BIPAP  --via nasal canula qhs    #Sacral Osteomyelitis  - Zosyn TID - completed on 5/12/25    #Paroxysmal AFIB  - Eliquis 5mg BID     #Calf Tenderness  - BL LE doppler negative     #L hip pain  - limiting therapy progress   - Celebrex 200mg BID   - no acute findings on XR or CT   - L hip MRI (5/7) no significant findings     #HTN  - Amlodipine 10mg dialy     #Sleep/Mood  - Melatonin 9mg at HS   - Quetiapine 25mg at HS     #Skin  - LUQ surgical wound previous site of PEG tube and right groin- Clean wound and periwound skin with NS. Pat dry. Cover with small silicone foam with border. Change every other day or prn if soiled     Sacral/gluteal cleft to bilateral buttocks- Irrigate deep cavity and tunneling with Dakins solution 1/4 strength using peribottle or flushes, cleanse wound and satellite ulcerations to b/l buttocks with NS, Dry well. Apply Liquid barrier film to periwound skin. Apply TRIAD barrier paste to isolated ulcer overlying left buttock,  Apply Aquacel hydrofiber sheet to right buttock, pack sacral/gluteal cleft including deep tunnels with Dakins 1/4 strength moistened kerlix, leave at least 2" out at end to ensure full removal of packing with subsequent dressing changes. Cover entire area (sacrum and right buttock) with abdominal pad and tegaderm. Change twice a day and prn if soiled/compromised. Once dressing is in place and perineal care is provided, clean remaining skin with perineal spray, apply TRIAD moisture barrier cream to exposed skin every shift and prn.    Bilateral abdominal pannus, bilateral groin: Cleanse with luke-warm soap and water, dry well. Apply clotrimazole cream daily, followed by Interdry textile sheeting, under intertriginous folds leaving 2 inches exposed at ends to wick, remove to wash & dry affected area, then replace. Individual sheeting may be used for up to 5 days unless soiled. Inspect skin daily.     - L foot wound- betadine to followed by 4x4 gauze, ABD pad, and matilde daily  - R foot wound- mupirocin BID     -Continue to offload pressure; bariatric low airloss support surface, turn and position per protocol with use of fluidized positioning devices, continue incontinence and moisture care per protocol and use of single breathable incontinence pads, continue to offload heels with fluidized positioning devices/Ochoa-Lock positioning device (PS# [id number]). Continue use of bariatric seat cushion (people soft #[id number]) and limit sit time- no more than 2 consecutive hours at any given time.   - Pressure injury/Skin: OOB to Chair, PT/OT    - Ammonium lactate to BL LEs     #Neuropathy  - Cymbalta 60mg daily   - Gabapentin 1200mg TID     #Pain Mgmt   - Capsaicin cream to BL feet QID - Avoid use on damaged, broken, or irritated skin. Avoid occlusive dressing or heat   - Lidocaine patch to BL thighs   - PRN:; Oxycodone and Tylenol 650mg QID     #Morbid obesity  - Most recent weight 394lbs (5/7)     #GI/Bowel Mgmt   - Pantoprazole  - Bisacodyl 10mg at HS   - Miralax   - Naloxegal 25mg daily     #/Bladder Mgmt   #ELLA  #Urinary Retention  - Renvela 800mg TID  - Cr improving   - Bladder scan TID  - Flomax 0.4mg at HS     #FEN --Morbid obesity (BMI > 40).  - Diet - Regular + Thins  [CCHO]    - MVI     # Health Management:   Environmental challenge affecting dc plan--narrow door and need for ramp to house entrance  However, patient making goal directed effort towards therapy goals     #Precautions / PROPHYLAXIS:   - Falls  - ortho: Weight bearing status: WBAT in surgical shoe   - Lungs: Aspiration, Incentive Spirometer   - DVT: Lovenox  ----------------------------------------------  IDT 5/13  Sit to stand mod assist x 1 T/f bed to  Bussy board mod assist x 1  Took 4 steps on parallel bars with mo assist x 2  Barrier--leakage from sides of vac dressing  LE weakness  Ext 5/16 revised to 5/30 (interval left hip pain, leakage from sides of VAC, environmental issue at home--needs time to optimize function and ensure safe community dc, making goal directed progress)  ----------------------------------------------  Dr. CANTU's Liaison with Family/Providers:    5/13 D/W Plastic team, need to re evaluate wound vac in view of recent vac malfunction and leakage from sides of sacral wound    5/13--Girlfriend present at bedside, participated during review,   Discussed functional and clinical progress, including plan for plastics reconsult for vac re evaluation, plan to make up for time lost from therapy, to address left hip pain       -----------------------------------------------  OUTPATIENT/FOLLOW UP:    Your Primary Care Provider,   Phone: (   )    -  Fax: (   )    -  Follow Up Time: 1 week    [facility] [place],   [address] [place] [address]  Phone: [phone]  Fax: (   )    -  Follow Up Time:    Dr. Waterhouse  Podiatry  [phone]  Within 1 week   -----------------------------------------------

## 2025-05-13 NOTE — PROGRESS NOTE ADULT - SUBJECTIVE AND OBJECTIVE BOX
Subjective  Patient seen and examined at bedside this AM  Admits to sleeping well.  Girlfriend present.   Left hip pain is significantly reduced  Sacral wound vac in place. RN staff managing vac changes.   IV ABX completed on 5/12.     ROS--no acute pain, no fever or chest pain   B/l leg and sacral ulcers  Left hip pain with movement  LBM 5/12    Function   Pt rolled from side-to-side multi times throughout session. Pt able to roll to L  side Sup with no increase in pain. Pt able to roll to R side with min A 2/2 pain  in L hip to don on Nette pad.  Pt Nette from bed to WC with an assist of 2 with assist for L LE and wound vac.    Pt performed 4 standing trials in // bars with a min/mod A x3. Pt able to stand  from 45-60". Pt able to ambulate 3-4 steps on 3rd trials with assist blocking  stance leg, Pt able to advance alternate LE with only VCs and a WC follow in //  bars.  Pt use hip extension sheet to assist with hip extension.  Pt required prolonged rest breaks in between each set 2/2 pain and fatigue    Pt assisted with scooting back into chair and Nette back from Wc to bed at end  of session to have would check by RN Lavonne 2/2 possible leakage      Vital Signs Last 24 Hrs  T(C): 36.9 (13 May 2025 07:46), Max: 36.9 (13 May 2025 07:46)  T(F): 98.4 (13 May 2025 07:46), Max: 98.4 (13 May 2025 07:46)  HR: 83 (13 May 2025 07:46) (81 - 102)  BP: 118/76 (13 May 2025 07:46) (118/76 - 148/78)  BP(mean): --  RR: 16 (13 May 2025 07:46) (16 - 16)  SpO2: 95% (13 May 2025 07:46) (95% - 98%)        EXAM   Gen -  Comfortable,   HEENT - NAD  Neck - Supple, No limited ROM  Pulm - Clear  Cardiovascular - RRR, S1S2, No murmurs  Abdomen - Soft, non tender, +BS  Extremities - chronic skin changes consistent with elephantiasis, midline present RUE  Edema reducing     Neuro-  AAO X 3, Speech is normal     Motor - UE 5/5                    LEFT    LE - HF 1/5, KE 2/5 limited by bed size and body habitus, DF 1/5, PF 5/5--left hip/leg ROM limited by pain                     RIGHT LE - HF 3/5, KE 2/5 limited by bed size and body habitus , DF 1/5, PF 5/5        Sensory - Decreased to light touch bilateral lower extremities      Coordination - impaired lower extremities   Psychiatric - Mood stable, Affect WNL    Skin:  R foot plantar aspect lateral/distal side 7cm x 5.5cm ischemic wound from pressors  RLE 4th digit 1x1.5cm ischemic induced wound from pressors  RLE 5th digit 3x2cm ischemic induced wound from pressors  L foot plantar aspect lateral/distal side 6x7cm ischemic induced wound from pressor usage  LLE 4th digit 2x1cm ischemic induced wound from pressors  LLE 5th digit 2.5x3cm ischemic induced wound from pressors   R groin skin tear 1x1.5cm  Prior PEG insertion site 0.5x1cm   Former trach site 1x0.5cm  Unstageable pressure wound cocyx 7cmx4.5cm w/ 2.5cm depth  R buttock pressure induced wound semi contiguous with coccyx wound calling unstageable given prior debridement 8.5x5.5cm  L buttock 1.0x0.5cm stage 3 pressure injury  R buttock skin tear 3.5cm x 0.5cm and 1.5cmx0.5cm    MMS--antigravity upper extremities  LE limited by edema and debility,      RECENT LABS/IMAGING                        9.1    5.21  )-----------( 395      ( 12 May 2025 07:25 )             31.8     05-12    141  |  104  |  15  ----------------------------<  89  3.8   |  31  |  1.06    Ca    9.6      12 May 2025 07:25    TPro  7.4  /  Alb  2.3[L]  /  TBili  0.3  /  DBili  x   /  AST  16  /  ALT  17  /  AlkPhos  56  05-12      Urinalysis Basic - ( 12 May 2025 07:25 )    Color: x / Appearance: x / SG: x / pH: x  Gluc: 89 mg/dL / Ketone: x  / Bili: x / Urobili: x   Blood: x / Protein: x / Nitrite: x   Leuk Esterase: x / RBC: x / WBC x   Sq Epi: x / Non Sq Epi: x / Bacteria: x      MEDICATIONS  (STANDING):  amLODIPine   Tablet 10 milliGRAM(s) Oral daily  ammonium lactate 12% Lotion 1 Application(s) Topical two times a day  apixaban 5 milliGRAM(s) Oral two times a day  ascorbic acid 500 milliGRAM(s) Oral daily  bisacodyl 10 milliGRAM(s) Oral at bedtime  capsaicin 0.025% Cream 1 Application(s) Topical four times a day  celecoxib 200 milliGRAM(s) Oral every 12 hours  clotrimazole 1% Cream 1 Application(s) Topical <User Schedule>  Dakins Solution - 1/4 Strength 1 Application(s) Topical two times a day  dextrose 5%. 1000 milliLiter(s) (50 mL/Hr) IV Continuous <Continuous>  dextrose 5%. 1000 milliLiter(s) (100 mL/Hr) IV Continuous <Continuous>  dextrose 50% Injectable 25 Gram(s) IV Push once  dextrose 50% Injectable 12.5 Gram(s) IV Push once  dextrose 50% Injectable 25 Gram(s) IV Push once  DULoxetine 60 milliGRAM(s) Oral daily  gabapentin 1200 milliGRAM(s) Oral every 8 hours  glucagon  Injectable 1 milliGRAM(s) IntraMuscular once  influenza   Vaccine 0.5 milliLiter(s) IntraMuscular once  lidocaine   4% Patch 1 Patch Transdermal daily  lidocaine   4% Patch 1 Patch Transdermal daily  magnesium oxide 400 milliGRAM(s) Oral three times a day with meals  melatonin 9 milliGRAM(s) Oral at bedtime  multivitamin 1 Tablet(s) Oral daily  mupirocin 2% Ointment 1 Application(s) Topical two times a day  naloxegol 25 milliGRAM(s) Oral daily  pantoprazole    Tablet 40 milliGRAM(s) Oral before breakfast  polyethylene glycol 3350 17 Gram(s) Oral two times a day  povidone iodine 10% Solution 1 Application(s) Topical daily  QUEtiapine 25 milliGRAM(s) Oral at bedtime  sevelamer carbonate 800 milliGRAM(s) Oral three times a day with meals  silver nitrate Applicator 6 Application(s) Topical once  tamsulosin 0.8 milliGRAM(s) Oral at bedtime  zinc sulfate 220 milliGRAM(s) Oral daily    MEDICATIONS  (PRN):  albuterol/ipratropium for Nebulization 3 milliLiter(s) Nebulizer every 6 hours PRN Shortness of Breath and/or Wheezing  dextrose Oral Gel 15 Gram(s) Oral once PRN Blood Glucose LESS THAN 70 milliGRAM(s)/deciliter  oxyCODONE    IR 20 milliGRAM(s) Oral every 6 hours PRN Severe Pain (7 - 10)  oxyCODONE    IR 15 milliGRAM(s) Oral two times a day PRN Moderate Pain (4 - 6)  oxyCODONE    IR 10 milliGRAM(s) Oral two times a day PRN Mild Pain (1 - 3)     Subjective  Patient seen and examined at bedside this AM  Admits to sleeping well.  Girlfriend present.   Leakage noted from sides of sacral wound vac requiring frequent vac changes and leading to interruption of his therapy.       ROS--no acute pain, no fever or chest pain   B/l leg and sacral ulcers  Left hip pain with movement  LBM 5/12    Function   Pt rolled from side-to-side multi times throughout session. Pt able to roll to L  side Sup with no increase in pain. Pt able to roll to R side with min A 2/2 pain  in L hip to don on Nette pad.  Pt Nette from bed to WC with an assist of 2 with assist for L LE and wound vac.    Pt performed 4 standing trials in // bars with a min/mod A x3. Pt able to stand  from 45-60". Pt able to ambulate 3-4 steps on 3rd trials with assist blocking  stance leg, Pt able to advance alternate LE with only VCs and a WC follow in //  bars.  Pt use hip extension sheet to assist with hip extension.  Pt required prolonged rest breaks in between each set 2/2 pain and fatigue    Pt assisted with scooting back into chair and Nette back from Wc to bed at end  of session to have would check by JAMILA Banerjee 2/2 possible leakage      Vital Signs Last 24 Hrs  T(C): 36.9 (13 May 2025 07:46), Max: 36.9 (13 May 2025 07:46)  T(F): 98.4 (13 May 2025 07:46), Max: 98.4 (13 May 2025 07:46)  HR: 83 (13 May 2025 07:46) (81 - 102)  BP: 118/76 (13 May 2025 07:46) (118/76 - 148/78)  RR: 16 (13 May 2025 07:46) (16 - 16)  SpO2: 95% (13 May 2025 07:46) (95% - 98%)      EXAM  Gen - Comfortable,   HEENT - NAD  Neck - Supple, No limited ROM  Pulm - Clear  Cardiovascular - RRR, S1S2, No murmurs  Abdomen - Soft, non tender, +BS  Extremities - chronic skin changes consistent with elephantiasis, midline present RUE  Edema reducing   leakage from sides of sacral wound vac    Neuro-  AAO X 3, Speech is normal     Motor - UE 5/5                    LEFT    LE - HF 1/5, KE 2/5 limited by bed size and body habitus, DF 1/5, PF 5/5--left hip/leg ROM limited by pain                     RIGHT LE - HF 3/5, KE 2/5 limited by bed size and body habitus , DF 1/5, PF 5/5        Sensory - Decreased to light touch bilateral lower extremities      Coordination - impaired lower extremities   Psychiatric - Mood stable, Affect WNL    Skin:  R foot plantar aspect lateral/distal side 7cm x 5.5cm ischemic wound from pressors  RLE 4th digit 1x1.5cm ischemic induced wound from pressors  RLE 5th digit 3x2cm ischemic induced wound from pressors  L foot plantar aspect lateral/distal side 6x7cm ischemic induced wound from pressor usage  LLE 4th digit 2x1cm ischemic induced wound from pressors  LLE 5th digit 2.5x3cm ischemic induced wound from pressors   R groin skin tear 1x1.5cm  Prior PEG insertion site 0.5x1cm   Former trach site 1x0.5cm  Unstageable pressure wound cocyx 7cmx4.5cm w/ 2.5cm depth  R buttock pressure induced wound semi contiguous with coccyx wound calling unstageable given prior debridement 8.5x5.5cm  L buttock 1.0x0.5cm stage 3 pressure injury  R buttock skin tear 3.5cm x 0.5cm and 1.5cmx0.5cm    MMS--antigravity upper extremities  LE limited by edema and debility,      RECENT LABS/IMAGING                        9.1    5.21  )-----------( 395      ( 12 May 2025 07:25 )             31.8     05-12    141  |  104  |  15  ----------------------------<  89  3.8   |  31  |  1.06    Ca    9.6      12 May 2025 07:25    TPro  7.4  /  Alb  2.3[L]  /  TBili  0.3  /  DBili  x   /  AST  16  /  ALT  17  /  AlkPhos  56  05-12      Urinalysis Basic - ( 12 May 2025 07:25 )    Color: x / Appearance: x / SG: x / pH: x  Gluc: 89 mg/dL / Ketone: x  / Bili: x / Urobili: x   Blood: x / Protein: x / Nitrite: x   Leuk Esterase: x / RBC: x / WBC x   Sq Epi: x / Non Sq Epi: x / Bacteria: x      MEDICATIONS  (STANDING):  amLODIPine   Tablet 10 milliGRAM(s) Oral daily  ammonium lactate 12% Lotion 1 Application(s) Topical two times a day  apixaban 5 milliGRAM(s) Oral two times a day  ascorbic acid 500 milliGRAM(s) Oral daily  bisacodyl 10 milliGRAM(s) Oral at bedtime  capsaicin 0.025% Cream 1 Application(s) Topical four times a day  celecoxib 200 milliGRAM(s) Oral every 12 hours  clotrimazole 1% Cream 1 Application(s) Topical <User Schedule>  Dakins Solution - 1/4 Strength 1 Application(s) Topical two times a day  dextrose 5%. 1000 milliLiter(s) (50 mL/Hr) IV Continuous <Continuous>  dextrose 5%. 1000 milliLiter(s) (100 mL/Hr) IV Continuous <Continuous>  dextrose 50% Injectable 25 Gram(s) IV Push once  dextrose 50% Injectable 12.5 Gram(s) IV Push once  dextrose 50% Injectable 25 Gram(s) IV Push once  DULoxetine 60 milliGRAM(s) Oral daily  gabapentin 1200 milliGRAM(s) Oral every 8 hours  glucagon  Injectable 1 milliGRAM(s) IntraMuscular once  influenza   Vaccine 0.5 milliLiter(s) IntraMuscular once  lidocaine   4% Patch 1 Patch Transdermal daily  lidocaine   4% Patch 1 Patch Transdermal daily  magnesium oxide 400 milliGRAM(s) Oral three times a day with meals  melatonin 9 milliGRAM(s) Oral at bedtime  multivitamin 1 Tablet(s) Oral daily  mupirocin 2% Ointment 1 Application(s) Topical two times a day  naloxegol 25 milliGRAM(s) Oral daily  pantoprazole    Tablet 40 milliGRAM(s) Oral before breakfast  polyethylene glycol 3350 17 Gram(s) Oral two times a day  povidone iodine 10% Solution 1 Application(s) Topical daily  QUEtiapine 25 milliGRAM(s) Oral at bedtime  sevelamer carbonate 800 milliGRAM(s) Oral three times a day with meals  silver nitrate Applicator 6 Application(s) Topical once  tamsulosin 0.8 milliGRAM(s) Oral at bedtime  zinc sulfate 220 milliGRAM(s) Oral daily    MEDICATIONS  (PRN):  albuterol/ipratropium for Nebulization 3 milliLiter(s) Nebulizer every 6 hours PRN Shortness of Breath and/or Wheezing  dextrose Oral Gel 15 Gram(s) Oral once PRN Blood Glucose LESS THAN 70 milliGRAM(s)/deciliter  oxyCODONE    IR 20 milliGRAM(s) Oral every 6 hours PRN Severe Pain (7 - 10)  oxyCODONE    IR 15 milliGRAM(s) Oral two times a day PRN Moderate Pain (4 - 6)  oxyCODONE    IR 10 milliGRAM(s) Oral two times a day PRN Mild Pain (1 - 3)

## 2025-05-14 LAB
CULTURE RESULTS: SIGNIFICANT CHANGE UP
SPECIMEN SOURCE: SIGNIFICANT CHANGE UP

## 2025-05-14 PROCEDURE — 99233 SBSQ HOSP IP/OBS HIGH 50: CPT

## 2025-05-14 PROCEDURE — 99232 SBSQ HOSP IP/OBS MODERATE 35: CPT

## 2025-05-14 RX ORDER — COLLAGENASE CLOSTRIDIUM HIST. 250 UNIT/G
1 OINTMENT (GRAM) TOPICAL
Refills: 0 | Status: DISCONTINUED | OUTPATIENT
Start: 2025-05-14 | End: 2025-06-13

## 2025-05-14 RX ADMIN — NALOXEGOL OXALATE 25 MILLIGRAM(S): 12.5 TABLET, FILM COATED ORAL at 13:15

## 2025-05-14 RX ADMIN — GABAPENTIN 1200 MILLIGRAM(S): 400 CAPSULE ORAL at 22:05

## 2025-05-14 RX ADMIN — Medication 400 MILLIGRAM(S): at 17:51

## 2025-05-14 RX ADMIN — OXYCODONE HYDROCHLORIDE 20 MILLIGRAM(S): 30 TABLET ORAL at 09:32

## 2025-05-14 RX ADMIN — GABAPENTIN 1200 MILLIGRAM(S): 400 CAPSULE ORAL at 06:29

## 2025-05-14 RX ADMIN — SEVELAMER HYDROCHLORIDE 800 MILLIGRAM(S): 800 TABLET ORAL at 17:51

## 2025-05-14 RX ADMIN — Medication 1 APPLICATION(S): at 17:52

## 2025-05-14 RX ADMIN — Medication 1 APPLICATION(S): at 13:18

## 2025-05-14 RX ADMIN — Medication 220 MILLIGRAM(S): at 13:15

## 2025-05-14 RX ADMIN — SODIUM HYPOCHLORITE 1 APPLICATION(S): 0.12 SOLUTION TOPICAL at 17:54

## 2025-05-14 RX ADMIN — Medication 1 APPLICATION(S): at 13:17

## 2025-05-14 RX ADMIN — SODIUM HYPOCHLORITE 1 APPLICATION(S): 0.12 SOLUTION TOPICAL at 06:18

## 2025-05-14 RX ADMIN — SEVELAMER HYDROCHLORIDE 800 MILLIGRAM(S): 800 TABLET ORAL at 14:43

## 2025-05-14 RX ADMIN — DULOXETINE 60 MILLIGRAM(S): 20 CAPSULE, DELAYED RELEASE ORAL at 13:15

## 2025-05-14 RX ADMIN — Medication 400 MILLIGRAM(S): at 08:25

## 2025-05-14 RX ADMIN — Medication 9 MILLIGRAM(S): at 22:05

## 2025-05-14 RX ADMIN — Medication 400 MILLIGRAM(S): at 14:43

## 2025-05-14 RX ADMIN — Medication 1 TABLET(S): at 13:15

## 2025-05-14 RX ADMIN — GABAPENTIN 1200 MILLIGRAM(S): 400 CAPSULE ORAL at 13:14

## 2025-05-14 RX ADMIN — APIXABAN 5 MILLIGRAM(S): 2.5 TABLET, FILM COATED ORAL at 06:29

## 2025-05-14 RX ADMIN — AMLODIPINE BESYLATE 10 MILLIGRAM(S): 10 TABLET ORAL at 06:29

## 2025-05-14 RX ADMIN — Medication 500 MILLIGRAM(S): at 13:15

## 2025-05-14 RX ADMIN — Medication 1 APPLICATION(S): at 06:30

## 2025-05-14 RX ADMIN — TAMSULOSIN HYDROCHLORIDE 0.8 MILLIGRAM(S): 0.4 CAPSULE ORAL at 22:06

## 2025-05-14 RX ADMIN — CELECOXIB 200 MILLIGRAM(S): 50 CAPSULE ORAL at 18:05

## 2025-05-14 RX ADMIN — OXYCODONE HYDROCHLORIDE 20 MILLIGRAM(S): 30 TABLET ORAL at 22:04

## 2025-05-14 RX ADMIN — Medication 40 MILLIGRAM(S): at 06:29

## 2025-05-14 RX ADMIN — CELECOXIB 200 MILLIGRAM(S): 50 CAPSULE ORAL at 06:29

## 2025-05-14 RX ADMIN — OXYCODONE HYDROCHLORIDE 20 MILLIGRAM(S): 30 TABLET ORAL at 23:04

## 2025-05-14 RX ADMIN — APIXABAN 5 MILLIGRAM(S): 2.5 TABLET, FILM COATED ORAL at 17:53

## 2025-05-14 RX ADMIN — Medication 1 APPLICATION(S): at 17:54

## 2025-05-14 RX ADMIN — SEVELAMER HYDROCHLORIDE 800 MILLIGRAM(S): 800 TABLET ORAL at 08:25

## 2025-05-14 RX ADMIN — QUETIAPINE FUMARATE 25 MILLIGRAM(S): 25 TABLET ORAL at 22:05

## 2025-05-14 NOTE — PROGRESS NOTE ADULT - ASSESSMENT
39 year old male with PMH of morbid obesity, BEA, pAFIB (not on AC), HTN, and BL papilledema attributed to pseudotumor cerebri; who initially presented to  on 2/24 with SOB and BL LE edema. Found to have URI with progressive SOB and multifocal PNA on imaging. His hospital course was complicated by worsening respiratory distress and increased 02 demands secondary to secretions (requiring multiple intubation attempts) and eventual MICU admission. While in MICU, he 02 requirements continued despite optimal vent management. Concern noted for progressive ARDS, unable to prone given morbid obesity, and ultimately cannulated for vvECMO on 2/25 and transferred to [facility] for further management on 2/26. His hospital course at [facility] was significant for the following: diuretic and ABX management, s/p trach and PEG (placed on 3/7- PEG since removed), RCU admission, high grade fevers (secondary to panniculitis), progressively worsening sacral ulcer (likely osteomyelitis- s/p surgical debridement), BL foot wound (secondary to pressor use), neuropathy (secondary to critical illness polyneuropathy), ELLA (secondary to vancomycin toxicity and overdiuresis- since DCd- with improvement). Patient now admitted for a multidisciplinary rehab program. 04-25-25     # left hip pain - Improving  - on celebrex. patient to avoid if pain is tolerable as risk of bleeding is high with Eliquis  - hip xray--> no fracture  - CT hip: Again seen is a sacral decubitus wound which extends superficial as well as involving the right gluteus musculature and the posterior L5 with small amount of fluid and gas within the tract. Limited involvement on this noncontrast CT. This likely corresponds to adjacent phlegmonous changes  - MRI left hip: Incompletely characterized sacral wound with associated ill-defined collection of fluid and soft tissue gas which extends to/involve the right gluteus zurdo muscle as at CT pelvis study from one day prior. No interval change in the high STIR signal within the bilateral gluteal and thigh musculature suggestive of a myositis as compared to prior MRI study. No fracture, osseous destruction or aggressive periosteal reaction. No osteomyelitis at the left hip. Edema-like signal along the right and left greater trochanteric regions, overall increased as compared to the prior MRI study from 4/11/2025. Findings are felt to be reactive in etiology..  - pain control  - pt/ot    # Abnormal CT A/P:   - 5/2: CT A/P: Question of pneumatosis in the cecum with no other evidence of bowel ischemia. Correlation with lactate levels and clinical exam would be helpful.  - Patient has no symptoms. Abdomen is benign. Tolerating PO  - 5/2: Surgery cx noted: no intervention. c/w PO. refer to bariatric surgery post-DC dr botello at [place] or Dr. Segovia at [place] [per patient request]  - 5/3 GI cx noted: no GI intervention planned. c/w PO    #Possible Sacral Osteomyelitis  - Continue Zosyn TID - last dose evening of 5/11/25  - ID noted    #Stage IV Sacral Decub Ulcer  #Bilateral Buttocks Wounds  #Bilateral Foot Wounds  -5/2 CT A/P: An air and fluid containing collection in the right gluteus zurdo muscle in continuity with a subcutaneous sacral collection. No obvious skin defect to suggest a decubitus ulcer.  -5/7: MRI left hip: Incompletely characterized sacral wound with associated ill-defined collection of fluid and soft tissue gas which extends to/involve the right gluteus zurdo muscle as at CT pelvis study from one day prior. No interval change in the high STIR signal within the bilateral gluteal and thigh musculature suggestive of a myositis as compared to prior MRI study. No fracture, osseous destruction or aggressive periosteal reaction. No osteomyelitis at the left hip. Edema-like signal along the right and left greater trochanteric regions, overall increased as compared to the prior MRI study from 4/11/2025. Findings are felt to be reactive in etiology..  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 RV is not well visualized, enlarged RV cavity size a/ reduced RV systolic function. No significant valvular disease.  No echocardiographic evidence of pulmonary hypertension.  -s/p Trach (decannulated 3/28) and PEG (removed 4/15)   -Saturating well on RA  -Continue duoneb PRN   -BIPAP QHS (FiO2 40%, 18/10) via nasal mask   -Fall precaution  -Outpatient pulm follow up     #Chronic AFIB  #Sinus Tachycardia  - Patient has intermittent tachycardia. Per patient, His HR  mostly above 110 even when he is not sick. follows with cardio OP. not interested in rate control meds at this time.   - 5/3: EKG: NSR@94 bpm  - c/w Eliquis   - TSH WNL 2/25  - Sinus tachycardia likely multifactorial including pain and deconditioning. Low suspicion for PE, no SOB/hypoxia and he is already on full AC    #RIJ and Bilateral LE DVT  -Duplex RUE 3/14 showed Acute, non-occlusive deep vein thrombosis noted within the right internal jugular vein. Superficial vein thrombosis noted within the right antecubital cephalic vein.  -Duplex LE 3/14 showed Acute, occlusive deep vein thromboses noted within the right and left peroneal veins at both mid calves. Age-indeterminate, occlusive deep vein thrombosis visualized within the left gastrocnemius vein at the proximal calf.  -Continue eliquis  -Repeat duplex 4/29 showed No evidence of deep venous thrombosis in either lower extremity.    #Normocytic Anemia   -Likely multifactorial  -H/H stable, no evidence of active bleed   -Monitor CBC     #HTN  -Continue Amlodipine  -Monitor BP     #Sleep/Mood  -Continue Melatonin  -Continue Quetiapine    #History of Pseudotumor Cerebri   -Outpatient follow up     #ELLA (Improved)  -Baseline Cr appears to be ~0.8 range   -ELLA felt to be multifactorial in setting of cardiorenal w/ RVE, recurrent ELLA suspected due to vancomycin toxicity and diuresis   -Continue to encourage oral hydration   -Continue Renvela   -Low phos diet  -Monitor BMP    #Type 2 Diabetes  -HbA1C 6.7 on 2/25, Repeat HbA1C 4.9 on 4/28  -FS trend reviewed, has been within goal  -RAISS discontinued  -Monitor glucose on routine lab, controlled     #/Urinary Retention  -Flomax 5/1  -Monitor bladder scans     #DVT PPx - Eliquis  #GI PPx - PPI    will follow  d/w rehab team

## 2025-05-14 NOTE — PROGRESS NOTE ADULT - SUBJECTIVE AND OBJECTIVE BOX
( x ) No complaints (  ) Complains of:   Wound VAC constantly leaking, was removed yesterday for aquacel AG packing  T(F): 98.4 (05-13-25 @ 21:14), Max: 98.4 (05-13-25 @ 21:14)  HR: 84 (05-14-25 @ 06:27) (84 - 111)  BP: 125/78 (05-14-25 @ 06:27) (125/78 - 127/75)  RR: 16 (05-14-25 @ 06:27) (16 - 16)  SpO2: 93% (05-14-25 @ 06:27) (93% - 99%)        Wound Location R  Wound location 2       Wound loca  Wound location 4         Wound description:   (  ) clean  (  ) granulating  (  ) eschar   (  ) necrotic  (  ) fibrin  (  ) other:     Wound size  Length:   Width:   Depth:  [  ] Tunneling:  (  ) 1 o'clock (  ) 2 o'clock (  ) 3 o'clock (  ) 4 o'clock (  ) 5 o'clock (  ) 6 o' clock (  ) 7 o'clock (  ) 8 o'clock (  ) 9 o'clock  (  ) 10 o'clock (  ) 11 o' clock (  ) 12 o'clock    [  ] Undermining: (  ) 1 o'clock (  ) 2 o'clock (  ) 3 o'clock (  ) 4 o'clock (  ) 5 o'clock (  ) 6 o' clock (  ) 7 o'clock (  ) 8 o'clock (  ) 9 o'clock  (  ) 10 o'clock (  ) 11 o' clock (  ) 12 o'clock    [  ] Drainage: (  ) serous (  ) sero-sanguinous (  ) sanguinous (  ) purulent (  ) cloudy (  ) clear  (  ) other:                         Procedure Performed:  (  )Yes     (  )No  Name of Procedure:  (  )debridement    (  )I&D    (  )Other:  (  )partial thickness     (  )full thickness     (  )subcutaneous     (  )muscle/tendon     (  )bone  (  )sharp     (  )surgical

## 2025-05-14 NOTE — PROGRESS NOTE ADULT - SUBJECTIVE AND OBJECTIVE BOX
39 year old male with PMH of morbid obesity, BEA, pAFIB (not on AC), HTN, and BL papilledema attributed to pseudotumor cerebri; who initially presented to  on 2/24 with SOB and BL LE edema. Found to have URI with progressive SOB and multifocal PNA on imaging. His hospital course was complicated by worsening respiratory distress and increased 02 demands secondary to secretions (requiring multiple intubation attempts) and eventual MICU admission. While in MICU, he 02 requirements continued despite optimal vent management. Concern noted for progressive ARDS, unable to prone given morbid obesity, and ultimately cannulated for vvECMO on 2/25 and transferred to [facility] for further management on 2/26. His hospital course at [facility] was significant for the following: diuretic and ABX management, s/p trach and PEG (placed on 3/7- PEG since removed), RCU admission, high grade fevers (secondary to panniculitis), progressively worsening sacral ulcer (likely osteomyelitis- s/p surgical debridement), BL foot wound (secondary to pressor use), neuropathy (secondary to critical illness polyneuropathy), ELLA (secondary to vancomycin toxicity and overdiuresis- since DCd- with improvement)    Patient seen and examined at bedside. No acute events noted. wound vac taken out yesterday. No new complaints.     MEDICATIONS  (STANDING):  amLODIPine   Tablet 10 milliGRAM(s) Oral daily  ammonium lactate 12% Lotion 1 Application(s) Topical two times a day  apixaban 5 milliGRAM(s) Oral two times a day  ascorbic acid 500 milliGRAM(s) Oral daily  bisacodyl 10 milliGRAM(s) Oral at bedtime  capsaicin 0.025% Cream 1 Application(s) Topical four times a day  celecoxib 200 milliGRAM(s) Oral every 12 hours  clotrimazole 1% Cream 1 Application(s) Topical <User Schedule>  Dakins Solution - 1/4 Strength 1 Application(s) Topical two times a day  dextrose 5%. 1000 milliLiter(s) (50 mL/Hr) IV Continuous <Continuous>  dextrose 5%. 1000 milliLiter(s) (100 mL/Hr) IV Continuous <Continuous>  dextrose 50% Injectable 25 Gram(s) IV Push once  dextrose 50% Injectable 12.5 Gram(s) IV Push once  dextrose 50% Injectable 25 Gram(s) IV Push once  DULoxetine 60 milliGRAM(s) Oral daily  gabapentin 1200 milliGRAM(s) Oral every 8 hours  glucagon  Injectable 1 milliGRAM(s) IntraMuscular once  influenza   Vaccine 0.5 milliLiter(s) IntraMuscular once  lidocaine   4% Patch 1 Patch Transdermal daily  lidocaine   4% Patch 1 Patch Transdermal daily  magnesium oxide 400 milliGRAM(s) Oral three times a day with meals  melatonin 9 milliGRAM(s) Oral at bedtime  multivitamin 1 Tablet(s) Oral daily  mupirocin 2% Ointment 1 Application(s) Topical two times a day  naloxegol 25 milliGRAM(s) Oral daily  pantoprazole    Tablet 40 milliGRAM(s) Oral before breakfast  polyethylene glycol 3350 17 Gram(s) Oral two times a day  povidone iodine 10% Solution 1 Application(s) Topical daily  QUEtiapine 25 milliGRAM(s) Oral at bedtime  sevelamer carbonate 800 milliGRAM(s) Oral three times a day with meals  silver nitrate Applicator 6 Application(s) Topical once  tamsulosin 0.8 milliGRAM(s) Oral at bedtime  zinc sulfate 220 milliGRAM(s) Oral daily    MEDICATIONS  (PRN):  albuterol/ipratropium for Nebulization 3 milliLiter(s) Nebulizer every 6 hours PRN Shortness of Breath and/or Wheezing  dextrose Oral Gel 15 Gram(s) Oral once PRN Blood Glucose LESS THAN 70 milliGRAM(s)/deciliter  oxyCODONE    IR 20 milliGRAM(s) Oral every 6 hours PRN Severe Pain (7 - 10)  oxyCODONE    IR 15 milliGRAM(s) Oral two times a day PRN Moderate Pain (4 - 6)  oxyCODONE    IR 10 milliGRAM(s) Oral two times a day PRN Mild Pain (1 - 3)    T(C): 36.9 (05-13-25 @ 21:14), Max: 36.9 (05-13-25 @ 21:14)  T(F): 98.4 (05-13-25 @ 21:14), Max: 98.4 (05-13-25 @ 21:14)  HR: 84 (05-14-25 @ 06:27) (84 - 111)  BP: 125/78 (05-14-25 @ 06:27) (125/78 - 127/75)  ABP: --  ABP(mean): --  RR: 16 (05-14-25 @ 06:27) (16 - 16)  SpO2: 93% (05-14-25 @ 06:27) (93% - 99%)      GENERAL- NAD, morbid obesity +  EAR/NOSE/MOUTH/THROAT -  MMM  EYES- MOLLY, conjunctiva and Sclera clear  NECK- supple  RESPIRATORY-  clear to auscultation bilaterally  CARDIOVASCULAR - SIS2, RRR  GI - soft NT BS present  EXTREMITIES- LE edema  NEUROLOGY-  b/l LE weakness   PSYCHIATRY- AAO X 3    LABS:               CAPILLARY BLOOD GLUCOSE                RADIOLOGY & ADDITIONAL TESTS:    Consultant(s) Notes Reviewed:  [x ] YES  [ ] NO  Care Discussed with Consultants/Other Providers [ x] YES  [ ] NO  Imaging Personally Reviewed:  [x ] YES  [ ] NO

## 2025-05-14 NOTE — PROGRESS NOTE ADULT - SUBJECTIVE AND OBJECTIVE BOX
Subjective  Patient seen and examined at bedside this AM  Admits to sleeping well.  Girlfriend present.   L hip pain resolved.   Leakage noted from sides of sacral wound vac requiring frequent vac changes and leading to interruption of his therapy.   Discussed plan for Plastic Surgery to evaluate wound and provide updated recs today.     ROS--no acute pain, no fever or chest pain   B/l leg and sacral ulcers  LBM 5/12    Function   Pt rolled from side-to-side multi times throughout session. Pt able to roll to L  side Sup with no increase in pain. Pt able to roll to R side with min A 2/2 pain  in L hip to don on Nette pad.  Pt Nette from bed to WC with an assist of 2 with assist for L LE and wound vac.    Pt performed 4 standing trials in // bars with a min/mod A x3. Pt able to stand  from 45-60". Pt able to ambulate 3-4 steps on 3rd trials with assist blocking  stance leg, Pt able to advance alternate LE with only VCs and a WC follow in //  bars.  Pt use hip extension sheet to assist with hip extension.  Pt required prolonged rest breaks in between each set 2/2 pain and fatigue    Pt assisted with scooting back into chair and Nette back from Wc to bed at end  of session to have would check by JAMILA Banerjee 2/2 possible leakage      Vital Signs Last 24 Hrs  T(C): 36.9 (13 May 2025 21:14), Max: 36.9 (13 May 2025 21:14)  T(F): 98.4 (13 May 2025 21:14), Max: 98.4 (13 May 2025 21:14)  HR: 84 (14 May 2025 06:27) (84 - 111)  BP: 125/78 (14 May 2025 06:27) (125/78 - 127/75)  BP(mean): --  RR: 16 (14 May 2025 06:27) (16 - 16)  SpO2: 93% (14 May 2025 06:27) (93% - 99%)      EXAM  Gen - Comfortable,   HEENT - NAD  Neck - Supple, No limited ROM  Pulm - Clear  Cardiovascular - RRR, S1S2, No murmurs  Abdomen - Soft, non tender, +BS  Extremities - chronic skin changes consistent with elephantiasis, midline present RUE  Edema reducing   leakage from sides of sacral wound vac    Neuro-  AAO X 3, Speech is normal     Motor - UE 5/5                    LEFT    LE - HF 1/5, KE 2/5 limited by bed size and body habitus, DF 1/5, PF 5/5--left hip/leg ROM limited by pain                     RIGHT LE - HF 3/5, KE 2/5 limited by bed size and body habitus , DF 1/5, PF 5/5        Sensory - Decreased to light touch bilateral lower extremities      Coordination - impaired lower extremities   Psychiatric - Mood stable, Affect WNL    Skin:  R foot plantar aspect lateral/distal side 7cm x 5.5cm ischemic wound from pressors  RLE 4th digit 1x1.5cm ischemic induced wound from pressors  RLE 5th digit 3x2cm ischemic induced wound from pressors  L foot plantar aspect lateral/distal side 6x7cm ischemic induced wound from pressor usage  LLE 4th digit 2x1cm ischemic induced wound from pressors  LLE 5th digit 2.5x3cm ischemic induced wound from pressors   R groin skin tear 1x1.5cm  Prior PEG insertion site 0.5x1cm   Former trach site 1x0.5cm  Unstageable pressure wound cocyx 7cmx4.5cm w/ 2.5cm depth  R buttock pressure induced wound semi contiguous with coccyx wound calling unstageable given prior debridement 8.5x5.5cm  L buttock 1.0x0.5cm stage 3 pressure injury  R buttock skin tear 3.5cm x 0.5cm and 1.5cmx0.5cm    MMS--antigravity upper extremities  LE limited by edema and debility,      RECENT LABS/IMAGING                        9.1    5.21  )-----------( 395      ( 12 May 2025 07:25 )             31.8     05-12    141  |  104  |  15  ----------------------------<  89  3.8   |  31  |  1.06    Ca    9.6      12 May 2025 07:25    TPro  7.4  /  Alb  2.3[L]  /  TBili  0.3  /  DBili  x   /  AST  16  /  ALT  17  /  AlkPhos  56  05-12      Urinalysis Basic - ( 12 May 2025 07:25 )    Color: x / Appearance: x / SG: x / pH: x  Gluc: 89 mg/dL / Ketone: x  / Bili: x / Urobili: x   Blood: x / Protein: x / Nitrite: x   Leuk Esterase: x / RBC: x / WBC x   Sq Epi: x / Non Sq Epi: x / Bacteria: x      MEDICATIONS  (STANDING):  amLODIPine   Tablet 10 milliGRAM(s) Oral daily  ammonium lactate 12% Lotion 1 Application(s) Topical two times a day  apixaban 5 milliGRAM(s) Oral two times a day  ascorbic acid 500 milliGRAM(s) Oral daily  bisacodyl 10 milliGRAM(s) Oral at bedtime  capsaicin 0.025% Cream 1 Application(s) Topical four times a day  celecoxib 200 milliGRAM(s) Oral every 12 hours  clotrimazole 1% Cream 1 Application(s) Topical <User Schedule>  Dakins Solution - 1/4 Strength 1 Application(s) Topical two times a day  dextrose 5%. 1000 milliLiter(s) (50 mL/Hr) IV Continuous <Continuous>  dextrose 5%. 1000 milliLiter(s) (100 mL/Hr) IV Continuous <Continuous>  dextrose 50% Injectable 25 Gram(s) IV Push once  dextrose 50% Injectable 12.5 Gram(s) IV Push once  dextrose 50% Injectable 25 Gram(s) IV Push once  DULoxetine 60 milliGRAM(s) Oral daily  gabapentin 1200 milliGRAM(s) Oral every 8 hours  glucagon  Injectable 1 milliGRAM(s) IntraMuscular once  influenza   Vaccine 0.5 milliLiter(s) IntraMuscular once  lidocaine   4% Patch 1 Patch Transdermal daily  lidocaine   4% Patch 1 Patch Transdermal daily  magnesium oxide 400 milliGRAM(s) Oral three times a day with meals  melatonin 9 milliGRAM(s) Oral at bedtime  multivitamin 1 Tablet(s) Oral daily  mupirocin 2% Ointment 1 Application(s) Topical two times a day  naloxegol 25 milliGRAM(s) Oral daily  pantoprazole    Tablet 40 milliGRAM(s) Oral before breakfast  polyethylene glycol 3350 17 Gram(s) Oral two times a day  povidone iodine 10% Solution 1 Application(s) Topical daily  QUEtiapine 25 milliGRAM(s) Oral at bedtime  sevelamer carbonate 800 milliGRAM(s) Oral three times a day with meals  silver nitrate Applicator 6 Application(s) Topical once  tamsulosin 0.8 milliGRAM(s) Oral at bedtime  zinc sulfate 220 milliGRAM(s) Oral daily    MEDICATIONS  (PRN):  albuterol/ipratropium for Nebulization 3 milliLiter(s) Nebulizer every 6 hours PRN Shortness of Breath and/or Wheezing  dextrose Oral Gel 15 Gram(s) Oral once PRN Blood Glucose LESS THAN 70 milliGRAM(s)/deciliter  oxyCODONE    IR 20 milliGRAM(s) Oral every 6 hours PRN Severe Pain (7 - 10)  oxyCODONE    IR 15 milliGRAM(s) Oral two times a day PRN Moderate Pain (4 - 6)  oxyCODONE    IR 10 milliGRAM(s) Oral two times a day PRN Mild Pain (1 - 3)     Subjective  Patient seen and examined at bedside this AM, observed in therapy and discussed wound vac care with plastics team  Admits to sleeping well.  Father present during review  Leakage from sacral wound, hence vac momentarily removed pending f/u review by plastics   Discussed plan for Plastic Surgery to evaluate wound and provide updated recs today.     ROS--no acute pain, no fever or chest pain   B/l leg and sacral ulcers  LBM 5/13    Function   -Left/Right rolling with min assist for Nette pad placement  -Nette bed to/from wheelchair today with assist of 2, second person to manage  wound vac line and to assist in positioning into wheelchair.  -Performed Pull to  parallel bars with MIN A of 2 persons at sides, and  assist of 3rd person anterior blocking at knees and to facilitate hip extension  Pt performed 6 standing trials in // bars with a min A x3. Pt able to stand from  approximately 1-1.5 minute intervals needing to rest due to fatigue and SOB and  c/o dizziness on last 2 attempts  -Patient returned to bed at end of session due to discomfort at wounds.    Vital Signs Last 24 Hrs  T(C): 36.9 (13 May 2025 21:14), Max: 36.9 (13 May 2025 21:14)  T(F): 98.4 (13 May 2025 21:14), Max: 98.4 (13 May 2025 21:14)  HR: 84 (14 May 2025 06:27) (84 - 111)  BP: 125/78 (14 May 2025 06:27) (125/78 - 127/75)  RR: 16 (14 May 2025 06:27) (16 - 16)  SpO2: 93% (14 May 2025 06:27) (93% - 99%)      EXAM  Gen - Comfortable,   HEENT - NAD  Neck - Supple, No limited ROM  Pulm - Clear  Cardiovascular - RRR, S1S2, No murmurs  Abdomen - Soft, non tender, +BS  Extremities - chronic skin changes consistent with elephantiasis, midline present RUE  Edema reducing   leakage from sides of sacral wound vac    Neuro-  AAO X 3, Speech is normal     Motor - UE 5/5                    LEFT    LE - HF 3/5, KE 3/5 ankle 2/5                    RIGHT LE - HF 3/5, KE 3/5 ankle 2/5      Sensory - Decreased to light touch bilateral lower extremities      Coordination - impaired lower extremities   Psychiatric - Mood stable, Affect WNL    Skin:  R foot plantar aspect lateral/distal side 7cm x 5.5cm ischemic wound from pressors  RLE 4th digit 1x1.5cm ischemic induced wound from pressors  RLE 5th digit 3x2cm ischemic induced wound from pressors  L foot plantar aspect lateral/distal side 6x7cm ischemic induced wound from pressor usage  LLE 4th digit 2x1cm ischemic induced wound from pressors  LLE 5th digit 2.5x3cm ischemic induced wound from pressors   R groin skin tear 1x1.5cm  Prior PEG insertion site 0.5x1cm   Former trach site 1x0.5cm  Unstageable pressure wound cocyx 7cmx4.5cm w/ 2.5cm depth  R buttock pressure induced wound semi contiguous with coccyx wound calling unstageable given prior debridement 8.5x5.5cm  L buttock 1.0x0.5cm stage 3 pressure injury  R buttock skin tear 3.5cm x 0.5cm and 1.5cmx0.5cm    MMS--antigravity upper extremities        RECENT LABS/IMAGING                        9.1    5.21  )-----------( 395      ( 12 May 2025 07:25 )             31.8     05-12    141  |  104  |  15  ----------------------------<  89  3.8   |  31  |  1.06    Ca    9.6      12 May 2025 07:25    TPro  7.4  /  Alb  2.3[L]  /  TBili  0.3  /  DBili  x   /  AST  16  /  ALT  17  /  AlkPhos  56  05-12      Urinalysis Basic - ( 12 May 2025 07:25 )    Color: x / Appearance: x / SG: x / pH: x  Gluc: 89 mg/dL / Ketone: x  / Bili: x / Urobili: x   Blood: x / Protein: x / Nitrite: x   Leuk Esterase: x / RBC: x / WBC x   Sq Epi: x / Non Sq Epi: x / Bacteria: x      MEDICATIONS  (STANDING):  amLODIPine   Tablet 10 milliGRAM(s) Oral daily  ammonium lactate 12% Lotion 1 Application(s) Topical two times a day  apixaban 5 milliGRAM(s) Oral two times a day  ascorbic acid 500 milliGRAM(s) Oral daily  bisacodyl 10 milliGRAM(s) Oral at bedtime  capsaicin 0.025% Cream 1 Application(s) Topical four times a day  celecoxib 200 milliGRAM(s) Oral every 12 hours  clotrimazole 1% Cream 1 Application(s) Topical <User Schedule>  Dakins Solution - 1/4 Strength 1 Application(s) Topical two times a day  dextrose 5%. 1000 milliLiter(s) (50 mL/Hr) IV Continuous <Continuous>  dextrose 5%. 1000 milliLiter(s) (100 mL/Hr) IV Continuous <Continuous>  dextrose 50% Injectable 25 Gram(s) IV Push once  dextrose 50% Injectable 12.5 Gram(s) IV Push once  dextrose 50% Injectable 25 Gram(s) IV Push once  DULoxetine 60 milliGRAM(s) Oral daily  gabapentin 1200 milliGRAM(s) Oral every 8 hours  glucagon  Injectable 1 milliGRAM(s) IntraMuscular once  influenza   Vaccine 0.5 milliLiter(s) IntraMuscular once  lidocaine   4% Patch 1 Patch Transdermal daily  lidocaine   4% Patch 1 Patch Transdermal daily  magnesium oxide 400 milliGRAM(s) Oral three times a day with meals  melatonin 9 milliGRAM(s) Oral at bedtime  multivitamin 1 Tablet(s) Oral daily  mupirocin 2% Ointment 1 Application(s) Topical two times a day  naloxegol 25 milliGRAM(s) Oral daily  pantoprazole    Tablet 40 milliGRAM(s) Oral before breakfast  polyethylene glycol 3350 17 Gram(s) Oral two times a day  povidone iodine 10% Solution 1 Application(s) Topical daily  QUEtiapine 25 milliGRAM(s) Oral at bedtime  sevelamer carbonate 800 milliGRAM(s) Oral three times a day with meals  silver nitrate Applicator 6 Application(s) Topical once  tamsulosin 0.8 milliGRAM(s) Oral at bedtime  zinc sulfate 220 milliGRAM(s) Oral daily    MEDICATIONS  (PRN):  albuterol/ipratropium for Nebulization 3 milliLiter(s) Nebulizer every 6 hours PRN Shortness of Breath and/or Wheezing  dextrose Oral Gel 15 Gram(s) Oral once PRN Blood Glucose LESS THAN 70 milliGRAM(s)/deciliter  oxyCODONE    IR 20 milliGRAM(s) Oral every 6 hours PRN Severe Pain (7 - 10)  oxyCODONE    IR 15 milliGRAM(s) Oral two times a day PRN Moderate Pain (4 - 6)  oxyCODONE    IR 10 milliGRAM(s) Oral two times a day PRN Mild Pain (1 - 3)

## 2025-05-14 NOTE — PROGRESS NOTE ADULT - ASSESSMENT
stage 4 draining tunnelling wound with slough at base  -santyl with 1 inch plain packing twice daily  -off load  -d/c VAC    -stage 3 wound with hyper granulation tissue right buttock  -ca++ alginate 1-2 times/day

## 2025-05-14 NOTE — PROGRESS NOTE ADULT - ASSESSMENT
39 year old male with PMH of morbid obesity, BEA on CPAP, pAFIB (not on AC), HTN, and BL papilledema attributed to pseudotumor cerebri; who initially presented to  on 2/24 with SOB and BL LE edema. Found to have URI with progressive SOB and multifocal PNA on imaging. His hospital course was complicated by worsening respiratory distress and increased 02 demands secondary to secretions (requiring multiple intubation attempts) and eventual MICU admission. While in MICU, he 02 requirements continued despite optimal vent management. Concern noted for progressive ARDS, unable to prone given morbid obesity, and ultimately cannulated for vvECMO on 2/25 and transferred to [facility] for further management on 2/26.  His hospital course at [facility] was significant for the following: diuretic and ABX management, s/p trache and PEG (placed on 3/7- PEG since removed), RCU admission, high grade fevers (secondary to panniculitis), progressively worsening sacral ulcer (likely osteomyelitis- s/p surgical debridement), BL foot wound (secondary to pressor use), neuropathy (secondary to critical illness polyneuropathy), ELLA (secondary to vancomycin toxicity and overdiuresis- since DCd- with improvement). Patient now admitted for a multidisciplinary rehab program. 04-25-25 @ 15:19    * Plastic team reconsulted for review VAC dressing considering leakage from sides and interruption of his therapy schedule   * L hip pain responsive to treatment-- Celebrex, will gradually taper and dc within few days    # Critical illness myopathy due to respiratory failure and sepsis  (present on admission)   - Gait Instability, ADL impairments and Functional impairments: start Comprehensive Rehab Program of PT/OT  - 3 hours a day, 5 days a week   - PRN: Nebulizer QID     BEA on BIPAP  --via nasal canula qhs    #Sacral Osteomyelitis  - Zosyn TID - completed on 5/12/25    #Paroxysmal AFIB  - Eliquis 5mg BID     #Calf Tenderness  - BL LE doppler negative     #L hip pain  - limiting therapy progress   - Celebrex 200mg BID   - no acute findings on XR or CT   - L hip MRI (5/7) no significant findings     #HTN  - Amlodipine 10mg dialy     #Sleep/Mood  - Melatonin 9mg at HS   - Quetiapine 25mg at HS     #Skin  - LUQ surgical wound previous site of PEG tube and right groin- Clean wound and periwound skin with NS. Pat dry. Cover with small silicone foam with border. Change every other day or prn if soiled     Sacral/gluteal cleft to bilateral buttocks- Irrigate deep cavity and tunneling with Dakins solution 1/4 strength using peribottle or flushes, cleanse wound and satellite ulcerations to b/l buttocks with NS, Dry well. Apply Liquid barrier film to periwound skin. Apply TRIAD barrier paste to isolated ulcer overlying left buttock,  Apply Aquacel hydrofiber sheet to right buttock, pack sacral/gluteal cleft including deep tunnels with Dakins 1/4 strength moistened kerlix, leave at least 2" out at end to ensure full removal of packing with subsequent dressing changes. Cover entire area (sacrum and right buttock) with abdominal pad and tegaderm. Change twice a day and prn if soiled/compromised. Once dressing is in place and perineal care is provided, clean remaining skin with perineal spray, apply TRIAD moisture barrier cream to exposed skin every shift and prn.    Bilateral abdominal pannus, bilateral groin: Cleanse with luke-warm soap and water, dry well. Apply clotrimazole cream daily, followed by Interdry textile sheeting, under intertriginous folds leaving 2 inches exposed at ends to wick, remove to wash & dry affected area, then replace. Individual sheeting may be used for up to 5 days unless soiled. Inspect skin daily.     - L foot wound- betadine to followed by 4x4 gauze, ABD pad, and matilde daily  - R foot wound- mupirocin BID     -Continue to offload pressure; bariatric low airloss support surface, turn and position per protocol with use of fluidized positioning devices, continue incontinence and moisture care per protocol and use of single breathable incontinence pads, continue to offload heels with fluidized positioning devices/Ochoa-Lock positioning device (PS# [id number]). Continue use of bariatric seat cushion (people soft #[id number]) and limit sit time- no more than 2 consecutive hours at any given time.   - Pressure injury/Skin: OOB to Chair, PT/OT    - Ammonium lactate to BL LEs     #Neuropathy  - Cymbalta 60mg daily   - Gabapentin 1200mg TID     #Pain Mgmt   - Capsaicin cream to BL feet QID - Avoid use on damaged, broken, or irritated skin. Avoid occlusive dressing or heat   - Lidocaine patch to BL thighs   - PRN:; Oxycodone and Tylenol 650mg QID     #Morbid obesity  - Most recent weight 394lbs (5/7)     #GI/Bowel Mgmt   - Pantoprazole  - Bisacodyl 10mg at HS   - Miralax   - Naloxegal 25mg daily     #/Bladder Mgmt   #ELLA  #Urinary Retention  - Renvela 800mg TID  - Cr improving   - Bladder scan TID  - Flomax 0.4mg at HS     #FEN --Morbid obesity (BMI > 40).  - Diet - Regular + Thins  [CCHO]    - MVI     # Health Management:   Environmental challenge affecting dc plan--narrow door and need for ramp to house entrance  However, patient making goal directed effort towards therapy goals     #Precautions / PROPHYLAXIS:   - Falls  - ortho: Weight bearing status: WBAT in surgical shoe   - Lungs: Aspiration, Incentive Spirometer   - DVT: Lovenox  ----------------------------------------------  IDT 5/13  Sit to stand mod assist x 1 T/f bed to  Bussy board mod assist x 1  Took 4 steps on parallel bars with mo assist x 2  Barrier--leakage from sides of vac dressing  LE weakness  Ext 5/16 revised to 5/30 (interval left hip pain, leakage from sides of VAC, environmental issue at home--needs time to optimize function and ensure safe community dc, making goal directed progress)  ----------------------------------------------  Dr. CANTU's Liaison with Family/Providers:    5/13 D/W Plastic team, need to re evaluate wound vac in view of recent vac malfunction and leakage from sides of sacral wound    5/13--Girlfriend present at bedside, participated during review,   Discussed functional and clinical progress, including plan for plastics reconsult for vac re evaluation, plan to make up for time lost from therapy, to address left hip pain       -----------------------------------------------  OUTPATIENT/FOLLOW UP:    Your Primary Care Provider,   Phone: (   )    -  Fax: (   )    -  Follow Up Time: 1 week    [facility] [place],   [address] [place] [address]  Phone: [phone]  Fax: (   )    -  Follow Up Time:    Dr. Waterhouse  Podiatry  [phone]  Within 1 week   -----------------------------------------------    39 year old male with PMH of morbid obesity, BEA on CPAP, pAFIB (not on AC), HTN, and BL papilledema attributed to pseudotumor cerebri; who initially presented to  on 2/24 with SOB and BL LE edema. Found to have URI with progressive SOB and multifocal PNA on imaging. His hospital course was complicated by worsening respiratory distress and increased 02 demands secondary to secretions (requiring multiple intubation attempts) and eventual MICU admission. While in MICU, he 02 requirements continued despite optimal vent management. Concern noted for progressive ARDS, unable to prone given morbid obesity, and ultimately cannulated for vvECMO on 2/25 and transferred to [facility] for further management on 2/26.  His hospital course at [facility] was significant for the following: diuretic and ABX management, s/p trache and PEG (placed on 3/7- PEG since removed), RCU admission, high grade fevers (secondary to panniculitis), progressively worsening sacral ulcer (likely osteomyelitis- s/p surgical debridement), BL foot wound (secondary to pressor use), neuropathy (secondary to critical illness polyneuropathy), ELLA (secondary to vancomycin toxicity and overdiuresis- since DCd- with improvement). Patient now admitted for a multidisciplinary rehab program. 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 sepsis  (present on admission)   - Gait Instability, ADL impairments and Functional impairments: start Comprehensive Rehab Program of PT/OT  - 3 hours a day, 5 days a week   - PRN: Nebulizer QID     BEA on BIPAP  --via nasal canula qhs    #Sacral Osteomyelitis  - Zosyn TID - completed on 5/12/25    #Paroxysmal AFIB  - Eliquis 5mg BID     #Calf Tenderness  - BL LE doppler negative     #L hip pain  - limiting therapy progress   - Celebrex 200mg BID   - no acute findings on XR or CT   - L hip MRI (5/7) no significant findings     #HTN  - Amlodipine 10mg dialy     #Sleep/Mood  - Melatonin 9mg at HS   - Quetiapine 25mg at HS     #Skin  - LUQ surgical wound previous site of PEG tube and right groin- Clean wound and periwound skin with NS. Pat dry. Cover with small silicone foam with border. Change every other day or prn if soiled     Sacral/gluteal cleft to bilateral buttocks- Irrigate deep cavity and tunneling with Dakins solution 1/4 strength using peribottle or flushes, cleanse wound and satellite ulcerations to b/l buttocks with NS, Dry well. Apply Liquid barrier film to periwound skin. Apply TRIAD barrier paste to isolated ulcer overlying left buttock,  Apply Aquacel hydrofiber sheet to right buttock, pack sacral/gluteal cleft including deep tunnels with Dakins 1/4 strength moistened kerlix, leave at least 2" out at end to ensure full removal of packing with subsequent dressing changes. Cover entire area (sacrum and right buttock) with abdominal pad and tegaderm. Change twice a day and prn if soiled/compromised. Once dressing is in place and perineal care is provided, clean remaining skin with perineal spray, apply TRIAD moisture barrier cream to exposed skin every shift and prn.    Bilateral abdominal pannus, bilateral groin: Cleanse with luke-warm soap and water, dry well. Apply clotrimazole cream daily, followed by Interdry textile sheeting, under intertriginous folds leaving 2 inches exposed at ends to wick, remove to wash & dry affected area, then replace. Individual sheeting may be used for up to 5 days unless soiled. Inspect skin daily.     - L foot wound- betadine to followed by 4x4 gauze, ABD pad, and matilde daily  - R foot wound- mupirocin BID     -Continue to offload pressure; bariatric low airloss support surface, turn and position per protocol with use of fluidized positioning devices, continue incontinence and moisture care per protocol and use of single breathable incontinence pads, continue to offload heels with fluidized positioning devices/Ochoa-Lock positioning device (PS# [id number]). Continue use of bariatric seat cushion (people soft #[id number]) and limit sit time- no more than 2 consecutive hours at any given time.   - Pressure injury/Skin: OOB to Chair, PT/OT    - Ammonium lactate to BL LEs     #Neuropathy  - Cymbalta 60mg daily   - Gabapentin 1200mg TID     #Pain Mgmt   - Capsaicin cream to BL feet QID - Avoid use on damaged, broken, or irritated skin. Avoid occlusive dressing or heat   - Lidocaine patch to BL thighs   - PRN:; Oxycodone and Tylenol 650mg QID     #Morbid obesity  - Most recent weight 394lbs (5/7)     #GI/Bowel Mgmt   - Pantoprazole  - Bisacodyl 10mg at HS   - Miralax   - Naloxegal 25mg daily     #/Bladder Mgmt   #ELLA  #Urinary Retention--voiding per urethra  - Renvela 800mg TID  - Cr improving   - Bladder scan TID  - Flomax 0.4mg at HS     #FEN --Morbid obesity (BMI > 40).  - Diet - Regular + Thins  [CCHO]    - MVI     # Health Management:   Environmental challenge affecting dc plan--narrow door and need for ramp to house entrance  However, patient making goal directed effort towards therapy goals     #Precautions / PROPHYLAXIS:   - Falls  - ortho: Weight bearing status: WBAT in surgical shoe   - Lungs: Aspiration, Incentive Spirometer   - DVT: Lovenox  ----------------------------------------------  IDT 5/13  Sit to stand mod assist x 1 T/f bed to  [email or web address].io board mod assist x 1  Took 4 steps on parallel bars with mo assist x 2  Barrier--leakage from sides of vac dressing  LE weakness  Ext 5/16 revised to 5/30 (interval left hip pain, leakage from sides of VAC, environmental issue at home--needs time to optimize function and ensure safe community dc, making goal directed progress)  ----------------------------------------------  Dr. CANTU's Liaison with Family/Providers:  5/14-Discussed issue with vac, with plastics team, they will review VAC dressing and make recs later today      5/14--* Discussed update from IDT yesterday, with patient and father, both happy with extended dc date  -----------------------------------------------  OUTPATIENT/FOLLOW UP:    Your Primary Care Provider,   Phone: (   )    -  Fax: (   )    -  Follow Up Time: 1 week    [facility] [place],   [address] [place] [address]  Phone: [phone]  Fax: (   )    -  Follow Up Time:    Dr. Waterhouse  Podiatry  [phone]  Within 1 week   -----------------------------------------------

## 2025-05-14 NOTE — PROGRESS NOTE ADULT - SUBJECTIVE AND OBJECTIVE BOX
(x  ) No complaints (  ) Complains of:   wound vac for sacral wound discontinued due to leakage    T(F): 98.4 (05-13-25 @ 21:14), Max: 98.4 (05-13-25 @ 21:14)  HR: 84 (05-14-25 @ 06:27) (84 - 111)  BP: 125/78 (05-14-25 @ 06:27) (125/78 - 127/75)  RR: 16 (05-14-25 @ 06:27) (16 - 16)  SpO2: 93% (05-14-25 @ 06:27) (93% - 99%)        Wound Location right buttock, hyperganulation tissue was cauterized with silver nitrate sticks       Wound location sacral wound, granuloma at 12 o'clock cauterized, wound deep 7.5 cm with multiple tunnels and slough at base         Procedure Performed:  (  x)Yes     (  )No  Name of Procedure:  (  )debridement    (  )I&D    ( x )Other: chemical cauterization right buttock and sacral wounds with silver nitrate sticks  (  )partial thickness     (  )full thickness     (  )subcutaneous     (  )muscle/tendon     (  )bone  (  )sharp     (  )surgical

## 2025-05-15 LAB
ALBUMIN SERPL ELPH-MCNC: 2.2 G/DL — LOW (ref 3.3–5)
ALP SERPL-CCNC: 55 U/L — SIGNIFICANT CHANGE UP (ref 40–120)
ALT FLD-CCNC: 9 U/L — LOW (ref 10–45)
ANION GAP SERPL CALC-SCNC: 6 MMOL/L — SIGNIFICANT CHANGE UP (ref 5–17)
AST SERPL-CCNC: 13 U/L — SIGNIFICANT CHANGE UP (ref 10–40)
BASOPHILS # BLD AUTO: 0.02 K/UL — SIGNIFICANT CHANGE UP (ref 0–0.2)
BASOPHILS NFR BLD AUTO: 0.3 % — SIGNIFICANT CHANGE UP (ref 0–2)
BILIRUB SERPL-MCNC: 0.4 MG/DL — SIGNIFICANT CHANGE UP (ref 0.2–1.2)
BUN SERPL-MCNC: 12 MG/DL — SIGNIFICANT CHANGE UP (ref 7–23)
CALCIUM SERPL-MCNC: 9.4 MG/DL — SIGNIFICANT CHANGE UP (ref 8.4–10.5)
CHLORIDE SERPL-SCNC: 104 MMOL/L — SIGNIFICANT CHANGE UP (ref 96–108)
CO2 SERPL-SCNC: 29 MMOL/L — SIGNIFICANT CHANGE UP (ref 22–31)
CREAT SERPL-MCNC: 1.1 MG/DL — SIGNIFICANT CHANGE UP (ref 0.5–1.3)
EGFR: 89 ML/MIN/1.73M2 — SIGNIFICANT CHANGE UP
EGFR: 89 ML/MIN/1.73M2 — SIGNIFICANT CHANGE UP
EOSINOPHIL # BLD AUTO: 0.22 K/UL — SIGNIFICANT CHANGE UP (ref 0–0.5)
EOSINOPHIL NFR BLD AUTO: 3.7 % — SIGNIFICANT CHANGE UP (ref 0–6)
GLUCOSE SERPL-MCNC: 88 MG/DL — SIGNIFICANT CHANGE UP (ref 70–99)
HCT VFR BLD CALC: 29.6 % — LOW (ref 39–50)
HGB BLD-MCNC: 8.7 G/DL — LOW (ref 13–17)
IMM GRANULOCYTES NFR BLD AUTO: 0.2 % — SIGNIFICANT CHANGE UP (ref 0–0.9)
LYMPHOCYTES # BLD AUTO: 2 K/UL — SIGNIFICANT CHANGE UP (ref 1–3.3)
LYMPHOCYTES # BLD AUTO: 34.1 % — SIGNIFICANT CHANGE UP (ref 13–44)
MCHC RBC-ENTMCNC: 25.5 PG — LOW (ref 27–34)
MCHC RBC-ENTMCNC: 29.4 G/DL — LOW (ref 32–36)
MCV RBC AUTO: 86.8 FL — SIGNIFICANT CHANGE UP (ref 80–100)
MONOCYTES # BLD AUTO: 0.95 K/UL — HIGH (ref 0–0.9)
MONOCYTES NFR BLD AUTO: 16.2 % — HIGH (ref 2–14)
NEUTROPHILS # BLD AUTO: 2.67 K/UL — SIGNIFICANT CHANGE UP (ref 1.8–7.4)
NEUTROPHILS NFR BLD AUTO: 45.5 % — SIGNIFICANT CHANGE UP (ref 43–77)
NRBC BLD AUTO-RTO: 0 /100 WBCS — SIGNIFICANT CHANGE UP (ref 0–0)
PLATELET # BLD AUTO: 337 K/UL — SIGNIFICANT CHANGE UP (ref 150–400)
POTASSIUM SERPL-MCNC: 3.6 MMOL/L — SIGNIFICANT CHANGE UP (ref 3.5–5.3)
POTASSIUM SERPL-SCNC: 3.6 MMOL/L — SIGNIFICANT CHANGE UP (ref 3.5–5.3)
PROT SERPL-MCNC: 6.9 G/DL — SIGNIFICANT CHANGE UP (ref 6–8.3)
RBC # BLD: 3.41 M/UL — LOW (ref 4.2–5.8)
RBC # FLD: 19.3 % — HIGH (ref 10.3–14.5)
SODIUM SERPL-SCNC: 139 MMOL/L — SIGNIFICANT CHANGE UP (ref 135–145)
WBC # BLD: 5.87 K/UL — SIGNIFICANT CHANGE UP (ref 3.8–10.5)
WBC # FLD AUTO: 5.87 K/UL — SIGNIFICANT CHANGE UP (ref 3.8–10.5)

## 2025-05-15 PROCEDURE — 99233 SBSQ HOSP IP/OBS HIGH 50: CPT

## 2025-05-15 RX ORDER — WHITE PETROLATUM 1 G/G
1 OINTMENT TOPICAL EVERY 8 HOURS
Refills: 0 | Status: DISCONTINUED | OUTPATIENT
Start: 2025-05-15 | End: 2025-06-13

## 2025-05-15 RX ADMIN — Medication 1 APPLICATION(S): at 06:17

## 2025-05-15 RX ADMIN — SEVELAMER HYDROCHLORIDE 800 MILLIGRAM(S): 800 TABLET ORAL at 17:34

## 2025-05-15 RX ADMIN — GABAPENTIN 1200 MILLIGRAM(S): 400 CAPSULE ORAL at 15:02

## 2025-05-15 RX ADMIN — APIXABAN 5 MILLIGRAM(S): 2.5 TABLET, FILM COATED ORAL at 17:34

## 2025-05-15 RX ADMIN — GABAPENTIN 1200 MILLIGRAM(S): 400 CAPSULE ORAL at 06:25

## 2025-05-15 RX ADMIN — TAMSULOSIN HYDROCHLORIDE 0.8 MILLIGRAM(S): 0.4 CAPSULE ORAL at 22:16

## 2025-05-15 RX ADMIN — SODIUM HYPOCHLORITE 1 APPLICATION(S): 0.12 SOLUTION TOPICAL at 06:18

## 2025-05-15 RX ADMIN — APIXABAN 5 MILLIGRAM(S): 2.5 TABLET, FILM COATED ORAL at 06:25

## 2025-05-15 RX ADMIN — OXYCODONE HYDROCHLORIDE 20 MILLIGRAM(S): 30 TABLET ORAL at 17:44

## 2025-05-15 RX ADMIN — Medication 1 APPLICATION(S): at 06:26

## 2025-05-15 RX ADMIN — OXYCODONE HYDROCHLORIDE 20 MILLIGRAM(S): 30 TABLET ORAL at 08:43

## 2025-05-15 RX ADMIN — Medication 1 APPLICATION(S): at 17:36

## 2025-05-15 RX ADMIN — WHITE PETROLATUM 1 APPLICATION(S): 1 OINTMENT TOPICAL at 15:01

## 2025-05-15 RX ADMIN — Medication 1 TABLET(S): at 11:44

## 2025-05-15 RX ADMIN — CELECOXIB 200 MILLIGRAM(S): 50 CAPSULE ORAL at 06:26

## 2025-05-15 RX ADMIN — Medication 10 MILLIGRAM(S): at 22:16

## 2025-05-15 RX ADMIN — GABAPENTIN 1200 MILLIGRAM(S): 400 CAPSULE ORAL at 22:16

## 2025-05-15 RX ADMIN — OXYCODONE HYDROCHLORIDE 20 MILLIGRAM(S): 30 TABLET ORAL at 09:43

## 2025-05-15 RX ADMIN — Medication 1 APPLICATION(S): at 15:01

## 2025-05-15 RX ADMIN — SEVELAMER HYDROCHLORIDE 800 MILLIGRAM(S): 800 TABLET ORAL at 11:43

## 2025-05-15 RX ADMIN — CELECOXIB 200 MILLIGRAM(S): 50 CAPSULE ORAL at 07:26

## 2025-05-15 RX ADMIN — Medication 40 MILLIGRAM(S): at 06:25

## 2025-05-15 RX ADMIN — OXYCODONE HYDROCHLORIDE 20 MILLIGRAM(S): 30 TABLET ORAL at 16:44

## 2025-05-15 RX ADMIN — Medication 1 APPLICATION(S): at 15:00

## 2025-05-15 RX ADMIN — NALOXEGOL OXALATE 25 MILLIGRAM(S): 12.5 TABLET, FILM COATED ORAL at 11:43

## 2025-05-15 RX ADMIN — AMLODIPINE BESYLATE 10 MILLIGRAM(S): 10 TABLET ORAL at 06:25

## 2025-05-15 RX ADMIN — Medication 500 MILLIGRAM(S): at 11:44

## 2025-05-15 RX ADMIN — QUETIAPINE FUMARATE 25 MILLIGRAM(S): 25 TABLET ORAL at 22:16

## 2025-05-15 RX ADMIN — Medication 9 MILLIGRAM(S): at 22:16

## 2025-05-15 RX ADMIN — OXYCODONE HYDROCHLORIDE 20 MILLIGRAM(S): 30 TABLET ORAL at 23:10

## 2025-05-15 RX ADMIN — Medication 400 MILLIGRAM(S): at 11:43

## 2025-05-15 RX ADMIN — DULOXETINE 60 MILLIGRAM(S): 20 CAPSULE, DELAYED RELEASE ORAL at 11:43

## 2025-05-15 RX ADMIN — SEVELAMER HYDROCHLORIDE 800 MILLIGRAM(S): 800 TABLET ORAL at 07:47

## 2025-05-15 RX ADMIN — Medication 400 MILLIGRAM(S): at 17:34

## 2025-05-15 RX ADMIN — Medication 1 APPLICATION(S): at 06:18

## 2025-05-15 RX ADMIN — Medication 400 MILLIGRAM(S): at 07:46

## 2025-05-15 RX ADMIN — Medication 220 MILLIGRAM(S): at 11:43

## 2025-05-15 RX ADMIN — SODIUM HYPOCHLORITE 1 APPLICATION(S): 0.12 SOLUTION TOPICAL at 17:36

## 2025-05-15 NOTE — PROGRESS NOTE ADULT - SUBJECTIVE AND OBJECTIVE BOX
Subjective  Patient seen and examined at bedside this AM.  Admits to sleeping well.  Father present during review.  Discussed Plastic Surgery recs.     ROS--no acute pain, no fever or chest pain   B/l leg and sacral ulcers  LBM 5/15    Function   -Left/Right rolling with min assist for Nette pad placement  -Nette bed to/from wheelchair today with assist of 2, second person to manage  wound vac line and to assist in positioning into wheelchair.  -Performed Pull to  parallel bars with MIN A of 2 persons at sides, and  assist of 3rd person anterior blocking at knees and to facilitate hip extension  Pt performed 6 standing trials in // bars with a min A x3. Pt able to stand from  approximately 1-1.5 minute intervals needing to rest due to fatigue and SOB and  c/o dizziness on last 2 attempts  -Patient returned to bed at end of session due to discomfort at wounds.    Vital Signs Last 24 Hrs  T(C): 36.9 (15 May 2025 07:49), Max: 36.9 (14 May 2025 14:46)  T(F): 98.5 (15 May 2025 07:49), Max: 98.5 (15 May 2025 07:49)  HR: 87 (15 May 2025 07:49) (87 - 112)  BP: 130/78 (15 May 2025 07:49) (110/68 - 130/78)  BP(mean): --  RR: 16 (15 May 2025 07:49) (16 - 16)  SpO2: 95% (15 May 2025 07:49) (94% - 97%)    EXAM  Gen - Comfortable,   HEENT - NAD  Neck - Supple, No limited ROM  Pulm - Clear  Cardiovascular - RRR, S1S2, No murmurs  Abdomen - Soft, non tender, +BS  Extremities - chronic skin changes consistent with elephantiasis, midline present RUE  Edema reducing   leakage from sides of sacral wound vac    Neuro-  AAO X 3, Speech is normal     Motor - UE 5/5                    LEFT    LE - HF 3/5, KE 3/5 ankle 2/5                    RIGHT LE - HF 3/5, KE 3/5 ankle 2/5      Sensory - Decreased to light touch bilateral lower extremities      Coordination - impaired lower extremities   Psychiatric - Mood stable, Affect WNL    Skin:  R foot plantar aspect lateral/distal side 7cm x 5.5cm ischemic wound from pressors  RLE 4th digit 1x1.5cm ischemic induced wound from pressors  RLE 5th digit 3x2cm ischemic induced wound from pressors  L foot plantar aspect lateral/distal side 6x7cm ischemic induced wound from pressor usage  LLE 4th digit 2x1cm ischemic induced wound from pressors  LLE 5th digit 2.5x3cm ischemic induced wound from pressors   R groin skin tear 1x1.5cm  Prior PEG insertion site 0.5x1cm   Former trach site 1x0.5cm  Unstageable pressure wound cocyx 7cmx4.5cm w/ 2.5cm depth  R buttock pressure induced wound semi contiguous with coccyx wound calling unstageable given prior debridement 8.5x5.5cm  L buttock 1.0x0.5cm stage 3 pressure injury  R buttock skin tear 3.5cm x 0.5cm and 1.5cmx0.5cm    MMS--antigravity upper extremities      LABS:                        8.7    5.87  )-----------( 337      ( 15 May 2025 06:59 )             29.6     05-15    139  |  104  |  12  MEDICATIONS  (STANDING):  amLODIPine   Tablet 10 milliGRAM(s) Oral daily  ammonium lactate 12% Lotion 1 Application(s) Topical two times a day  apixaban 5 milliGRAM(s) Oral two times a day  ascorbic acid 500 milliGRAM(s) Oral daily  bisacodyl 10 milliGRAM(s) Oral at bedtime  capsaicin 0.025% Cream 1 Application(s) Topical four times a day  clotrimazole 1% Cream 1 Application(s) Topical <User Schedule>  collagenase Ointment 1 Application(s) Topical two times a day  Dakins Solution - 1/4 Strength 1 Application(s) Topical two times a day  dextrose 5%. 1000 milliLiter(s) (50 mL/Hr) IV Continuous <Continuous>  dextrose 5%. 1000 milliLiter(s) (100 mL/Hr) IV Continuous <Continuous>  dextrose 50% Injectable 25 Gram(s) IV Push once  dextrose 50% Injectable 12.5 Gram(s) IV Push once  dextrose 50% Injectable 25 Gram(s) IV Push once  DULoxetine 60 milliGRAM(s) Oral daily  gabapentin 1200 milliGRAM(s) Oral every 8 hours  glucagon  Injectable 1 milliGRAM(s) IntraMuscular once  influenza   Vaccine 0.5 milliLiter(s) IntraMuscular once  lidocaine   4% Patch 1 Patch Transdermal daily  lidocaine   4% Patch 1 Patch Transdermal daily  magnesium oxide 400 milliGRAM(s) Oral three times a day with meals  melatonin 9 milliGRAM(s) Oral at bedtime  multivitamin 1 Tablet(s) Oral daily  mupirocin 2% Ointment 1 Application(s) Topical two times a day  naloxegol 25 milliGRAM(s) Oral daily  pantoprazole    Tablet 40 milliGRAM(s) Oral before breakfast  petrolatum white Ointment 1 Application(s) Topical every 8 hours  polyethylene glycol 3350 17 Gram(s) Oral two times a day  povidone iodine 10% Solution 1 Application(s) Topical daily  QUEtiapine 25 milliGRAM(s) Oral at bedtime  sevelamer carbonate 800 milliGRAM(s) Oral three times a day with meals  silver nitrate Applicator 6 Application(s) Topical once  tamsulosin 0.8 milliGRAM(s) Oral at bedtime  zinc sulfate 220 milliGRAM(s) Oral daily    MEDICATIONS  (PRN):  albuterol/ipratropium for Nebulization 3 milliLiter(s) Nebulizer every 6 hours PRN Shortness of Breath and/or Wheezing  dextrose Oral Gel 15 Gram(s) Oral once PRN Blood Glucose LESS THAN 70 milliGRAM(s)/deciliter  oxyCODONE    IR 20 milliGRAM(s) Oral every 6 hours PRN Severe Pain (7 - 10)  oxyCODONE    IR 15 milliGRAM(s) Oral two times a day PRN Moderate Pain (4 - 6)  oxyCODONE    IR 10 milliGRAM(s) Oral two times a day PRN Mild Pain (1 - 3)  ----------------------------<  88  3.6   |  29  |  1.10    Ca    9.4      15 May 2025 06:59    TPro  6.9  /  Alb  2.2[L]  /  TBili  0.4  /  DBili  x   /  AST  13  /  ALT  9[L]  /  AlkPhos  55  05-15      Urinalysis Basic - ( 15 May 2025 06:59 )    Color: x / Appearance: x / SG: x / pH: x  Gluc: 88 mg/dL / Ketone: x  / Bili: x / Urobili: x   Blood: x / Protein: x / Nitrite: x   Leuk Esterase: x / RBC: x / WBC x   Sq Epi: x / Non Sq Epi: x / Bacteria: x         Subjective  Patient seen and examined at bedside this AM.  Admits to sleeping well.  Father present during review.  Discussed Plastic Surgery recs.     ROS--no acute pain, no fever or chest pain   B/l leg and sacral ulcers  LBM 5/15    Function   -Ambulated 10' in parallel bars needing MOD A of 2 persons, 1 for anterior  guarding and blocking at knees and assist of second at side to provide upright  support. Wheelchair follow used. Initially provided blocking of knees in stance  phases, progressively backed off with good stance control noted. Excessive use  of UEs to weight bear through parallel bars to offload LEs.  -Tolerated 6 walking trials with extended rest breaks due to SOB and fatigue.    Vital Signs Last 24 Hrs  T(C): 36.9 (15 May 2025 07:49), Max: 36.9 (14 May 2025 14:46)  T(F): 98.5 (15 May 2025 07:49), Max: 98.5 (15 May 2025 07:49)  HR: 87 (15 May 2025 07:49) (87 - 112)  BP: 130/78 (15 May 2025 07:49) (110/68 - 130/78)  RR: 16 (15 May 2025 07:49) (16 - 16)  SpO2: 95% (15 May 2025 07:49) (94% - 97%)    EXAM  Gen - Comfortable,   HEENT - NAD  Neck - Supple, No limited ROM  Pulm - Clear  Cardiovascular - RRR, S1S2, No murmurs  Abdomen - Soft, non tender, +BS  Extremities - chronic skin changes consistent with elephantiasis, midline present RUE  Edema reducing   leakage from sides of sacral wound vac    Neuro-  AAO X 3, Speech is normal     Motor - UE 5/5                    LEFT    LE - HF 3/5, KE 3/5 ankle 2/5                    RIGHT LE - HF 3/5, KE 3/5 ankle 2/5      Sensory - Decreased to light touch bilateral lower extremities      Coordination - impaired lower extremities   Psychiatric - Mood stable, Affect WNL    Skin:  R foot plantar aspect lateral/distal side 7cm x 5.5cm ischemic wound from pressors  RLE 4th digit 1x1.5cm ischemic induced wound from pressors  RLE 5th digit 3x2cm ischemic induced wound from pressors  L foot plantar aspect lateral/distal side 6x7cm ischemic induced wound from pressor usage  LLE 4th digit 2x1cm ischemic induced wound from pressors  LLE 5th digit 2.5x3cm ischemic induced wound from pressors   R groin skin tear 1x1.5cm  Unstageable pressure wound cocyx 7cmx4.5cm w/ 2.5cm depth  R buttock pressure induced wound semi contiguous with coccyx wound calling unstageable given prior debridement 8.5x5.5cm  L buttock 1.0x0.5cm stage 3 pressure injury  R buttock skin tear 3.5cm x 0.5cm and 1.5cmx0.5cm    MMS--antigravity upper extremities        RECENT LABS/IMAGING                        8.7    5.87  )-----------( 337      ( 15 May 2025 06:59 )             29.6     05-15    139  |  104  |  12  ----------------------------<  88  3.6   |  29  |  1.10    Ca    9.4      15 May 2025 06:59    TPro  6.9  /  Alb  2.2[L]  /  TBili  0.4  /  DBili  x   /  AST  13  /  ALT  9[L]  /  AlkPhos  55  05-15      Urinalysis Basic - ( 15 May 2025 06:59 )    Color: x / Appearance: x / SG: x / pH: x  Gluc: 88 mg/dL / Ketone: x  / Bili: x / Urobili: x   Blood: x / Protein: x / Nitrite: x   Leuk Esterase: x / RBC: x / WBC x   Sq Epi: x / Non Sq Epi: x / Bacteria: x              MEDICATIONS  (STANDING):  amLODIPine   Tablet 10 milliGRAM(s) Oral daily  ammonium lactate 12% Lotion 1 Application(s) Topical two times a day  apixaban 5 milliGRAM(s) Oral two times a day  ascorbic acid 500 milliGRAM(s) Oral daily  bisacodyl 10 milliGRAM(s) Oral at bedtime  capsaicin 0.025% Cream 1 Application(s) Topical four times a day  clotrimazole 1% Cream 1 Application(s) Topical <User Schedule>  collagenase Ointment 1 Application(s) Topical two times a day  Dakins Solution - 1/4 Strength 1 Application(s) Topical two times a day  dextrose 5%. 1000 milliLiter(s) (50 mL/Hr) IV Continuous <Continuous>  dextrose 5%. 1000 milliLiter(s) (100 mL/Hr) IV Continuous <Continuous>  dextrose 50% Injectable 25 Gram(s) IV Push once  dextrose 50% Injectable 12.5 Gram(s) IV Push once  dextrose 50% Injectable 25 Gram(s) IV Push once  DULoxetine 60 milliGRAM(s) Oral daily  gabapentin 1200 milliGRAM(s) Oral every 8 hours  glucagon  Injectable 1 milliGRAM(s) IntraMuscular once  influenza   Vaccine 0.5 milliLiter(s) IntraMuscular once  lidocaine   4% Patch 1 Patch Transdermal daily  lidocaine   4% Patch 1 Patch Transdermal daily  magnesium oxide 400 milliGRAM(s) Oral three times a day with meals  melatonin 9 milliGRAM(s) Oral at bedtime  multivitamin 1 Tablet(s) Oral daily  mupirocin 2% Ointment 1 Application(s) Topical two times a day  naloxegol 25 milliGRAM(s) Oral daily  pantoprazole    Tablet 40 milliGRAM(s) Oral before breakfast  petrolatum white Ointment 1 Application(s) Topical every 8 hours  polyethylene glycol 3350 17 Gram(s) Oral two times a day  povidone iodine 10% Solution 1 Application(s) Topical daily  QUEtiapine 25 milliGRAM(s) Oral at bedtime  sevelamer carbonate 800 milliGRAM(s) Oral three times a day with meals  silver nitrate Applicator 6 Application(s) Topical once  tamsulosin 0.8 milliGRAM(s) Oral at bedtime  zinc sulfate 220 milliGRAM(s) Oral daily    MEDICATIONS  (PRN):  albuterol/ipratropium for Nebulization 3 milliLiter(s) Nebulizer every 6 hours PRN Shortness of Breath and/or Wheezing  dextrose Oral Gel 15 Gram(s) Oral once PRN Blood Glucose LESS THAN 70 milliGRAM(s)/deciliter  oxyCODONE    IR 20 milliGRAM(s) Oral every 6 hours PRN Severe Pain (7 - 10)  oxyCODONE    IR 15 milliGRAM(s) Oral two times a day PRN Moderate Pain (4 - 6)  oxyCODONE    IR 10 milliGRAM(s) Oral two times a day PRN Mild Pain (1 - 3)  ----------------------------<

## 2025-05-16 ENCOUNTER — TRANSCRIPTION ENCOUNTER (OUTPATIENT)
Age: 40
End: 2025-05-16

## 2025-05-16 PROCEDURE — 99232 SBSQ HOSP IP/OBS MODERATE 35: CPT

## 2025-05-16 RX ORDER — OXYCODONE HYDROCHLORIDE 30 MG/1
15 TABLET ORAL
Refills: 0 | Status: DISCONTINUED | OUTPATIENT
Start: 2025-05-16 | End: 2025-05-23

## 2025-05-16 RX ORDER — OXYCODONE HYDROCHLORIDE 30 MG/1
10 TABLET ORAL
Refills: 0 | Status: DISCONTINUED | OUTPATIENT
Start: 2025-05-16 | End: 2025-05-23

## 2025-05-16 RX ORDER — OXYCODONE HYDROCHLORIDE 30 MG/1
20 TABLET ORAL EVERY 6 HOURS
Refills: 0 | Status: DISCONTINUED | OUTPATIENT
Start: 2025-05-16 | End: 2025-05-23

## 2025-05-16 RX ADMIN — SEVELAMER HYDROCHLORIDE 800 MILLIGRAM(S): 800 TABLET ORAL at 17:29

## 2025-05-16 RX ADMIN — GABAPENTIN 1200 MILLIGRAM(S): 400 CAPSULE ORAL at 07:24

## 2025-05-16 RX ADMIN — QUETIAPINE FUMARATE 25 MILLIGRAM(S): 25 TABLET ORAL at 22:45

## 2025-05-16 RX ADMIN — AMLODIPINE BESYLATE 10 MILLIGRAM(S): 10 TABLET ORAL at 07:23

## 2025-05-16 RX ADMIN — APIXABAN 5 MILLIGRAM(S): 2.5 TABLET, FILM COATED ORAL at 07:24

## 2025-05-16 RX ADMIN — Medication 40 MILLIGRAM(S): at 07:25

## 2025-05-16 RX ADMIN — Medication 1 APPLICATION(S): at 19:47

## 2025-05-16 RX ADMIN — Medication 500 MILLIGRAM(S): at 11:22

## 2025-05-16 RX ADMIN — SODIUM HYPOCHLORITE 1 APPLICATION(S): 0.12 SOLUTION TOPICAL at 07:24

## 2025-05-16 RX ADMIN — Medication 1 APPLICATION(S): at 17:28

## 2025-05-16 RX ADMIN — SEVELAMER HYDROCHLORIDE 800 MILLIGRAM(S): 800 TABLET ORAL at 07:41

## 2025-05-16 RX ADMIN — Medication 1 APPLICATION(S): at 07:25

## 2025-05-16 RX ADMIN — CLOTRIMAZOLE 1 APPLICATION(S): 1 CREAM TOPICAL at 11:20

## 2025-05-16 RX ADMIN — Medication 1 APPLICATION(S): at 22:49

## 2025-05-16 RX ADMIN — MUPIROCIN CALCIUM 1 APPLICATION(S): 20 CREAM TOPICAL at 19:48

## 2025-05-16 RX ADMIN — Medication 400 MILLIGRAM(S): at 07:41

## 2025-05-16 RX ADMIN — Medication 9 MILLIGRAM(S): at 22:46

## 2025-05-16 RX ADMIN — APIXABAN 5 MILLIGRAM(S): 2.5 TABLET, FILM COATED ORAL at 17:29

## 2025-05-16 RX ADMIN — Medication 1 APPLICATION(S): at 13:09

## 2025-05-16 RX ADMIN — OXYCODONE HYDROCHLORIDE 20 MILLIGRAM(S): 30 TABLET ORAL at 23:46

## 2025-05-16 RX ADMIN — TAMSULOSIN HYDROCHLORIDE 0.8 MILLIGRAM(S): 0.4 CAPSULE ORAL at 22:45

## 2025-05-16 RX ADMIN — POLYETHYLENE GLYCOL 3350 17 GRAM(S): 17 POWDER, FOR SOLUTION ORAL at 07:24

## 2025-05-16 RX ADMIN — SEVELAMER HYDROCHLORIDE 800 MILLIGRAM(S): 800 TABLET ORAL at 11:22

## 2025-05-16 RX ADMIN — OXYCODONE HYDROCHLORIDE 20 MILLIGRAM(S): 30 TABLET ORAL at 22:46

## 2025-05-16 RX ADMIN — WHITE PETROLATUM 1 APPLICATION(S): 1 OINTMENT TOPICAL at 13:09

## 2025-05-16 RX ADMIN — GABAPENTIN 1200 MILLIGRAM(S): 400 CAPSULE ORAL at 22:46

## 2025-05-16 RX ADMIN — GABAPENTIN 1200 MILLIGRAM(S): 400 CAPSULE ORAL at 13:12

## 2025-05-16 RX ADMIN — Medication 1 APPLICATION(S): at 11:21

## 2025-05-16 RX ADMIN — WHITE PETROLATUM 1 APPLICATION(S): 1 OINTMENT TOPICAL at 22:49

## 2025-05-16 RX ADMIN — SODIUM HYPOCHLORITE 1 APPLICATION(S): 0.12 SOLUTION TOPICAL at 17:28

## 2025-05-16 RX ADMIN — Medication 400 MILLIGRAM(S): at 11:22

## 2025-05-16 RX ADMIN — Medication 1 APPLICATION(S): at 17:27

## 2025-05-16 RX ADMIN — NALOXEGOL OXALATE 25 MILLIGRAM(S): 12.5 TABLET, FILM COATED ORAL at 11:22

## 2025-05-16 RX ADMIN — Medication 220 MILLIGRAM(S): at 11:21

## 2025-05-16 RX ADMIN — MUPIROCIN CALCIUM 1 APPLICATION(S): 20 CREAM TOPICAL at 17:27

## 2025-05-16 RX ADMIN — OXYCODONE HYDROCHLORIDE 20 MILLIGRAM(S): 30 TABLET ORAL at 13:00

## 2025-05-16 RX ADMIN — DULOXETINE 60 MILLIGRAM(S): 20 CAPSULE, DELAYED RELEASE ORAL at 11:21

## 2025-05-16 RX ADMIN — Medication 400 MILLIGRAM(S): at 17:29

## 2025-05-16 RX ADMIN — Medication 1 TABLET(S): at 11:21

## 2025-05-16 NOTE — PROGRESS NOTE ADULT - ASSESSMENT
39 year old male with PMH of morbid obesity, BEA on CPAP, pAFIB (not on AC), HTN, and BL papilledema attributed to pseudotumor cerebri; who initially presented to  on 2/24 with SOB and BL LE edema. Found to have URI with progressive SOB and multifocal PNA on imaging. His hospital course was complicated by worsening respiratory distress and increased 02 demands secondary to secretions (requiring multiple intubation attempts) and eventual MICU admission. While in MICU, he 02 requirements continued despite optimal vent management. Concern noted for progressive ARDS, unable to prone given morbid obesity, and ultimately cannulated for vvECMO on 2/25 and transferred to [facility] for further management on 2/26.  His hospital course at [facility] was significant for the following: diuretic and ABX management, s/p trache and PEG (placed on 3/7- PEG since removed), RCU admission, high grade fevers (secondary to panniculitis), progressively worsening sacral ulcer (likely osteomyelitis- s/p surgical debridement), BL foot wound (secondary to pressor use), neuropathy (secondary to critical illness polyneuropathy), ELLA (secondary to vancomycin toxicity and overdiuresis- since DCd- with improvement). Patient now admitted for a multidisciplinary rehab program. 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  (present on admission)   - Gait Instability, ADL impairments and Functional impairments: start Comprehensive Rehab Program of PT/OT  - 3 hours a day, 5 days a week   - PRN: Nebulizer QID     #BEA on BIPAP  --via nasal canula qhs    #Sacral Osteomyelitis  - Zosyn TID - completed on 5/12/25    #Paroxysmal AFIB  - Eliquis 5mg BID     #Calf Tenderness  - BL LE doppler negative     #L hip pain - resolved  - limiting therapy progress   - Celebrex 200mg BID - completed  on 5/14  - no acute findings on XR or CT   - L hip MRI (5/7) no significant findings     #HTN  - Amlodipine 10mg dialy     #Sleep/Mood  - Melatonin 9mg at HS   - Quetiapine 25mg at HS     #Skin  Wound Recs per Plastic Surgery (5/14):  Sacral Pressure Injury: BID- Cleanse with NS, remove excess fluid, apply santyl ointment to sloughing tissues, pack wound with 1-inch dry plain packing, cover with 4 inch gauze and abd combine  - R buttock: BID- Cleanse with NS, overlay calcium alginate, ABD combine dressing  Additional wound care instructions:  -->Right anterior thigh to groin isolated wound: Clean wound and periwound skin with NS. Pat dry. Apply liquid barrier film to periwound skin, allow to dry. Cover with silicone foam with border. Change daily or PRN if soiled   --> Bilateral abdominal pannus, bilateral groin: Cleanse with luke-warm soap and water, dry well. Apply clotrimazole  --> Bilateral feet: Apply betadine  to bilateral foot wounds followed by 4x4 gauze, ABD pad, and matilde daily per podiatry.  --> Continue to offload pressure; bariatric low airloss support surface, turn and position per protocol with use of fluidized positioning devices, continue incontinence and moisture care per protocol and use of single breathable incontinence pads, continue to offload heels with fluidized positioning devices. Continue use of bariatric seat cushion, limit sit time- no more than 2 consecutive hours at any given time.  - Pressure injury/Skin: OOB to Chair, PT/OT    - Ammonium lactate to BL LEs     #Neuropathy  - Cymbalta 60mg daily   - Gabapentin 1200mg TID     #Pain Mgmt   - Capsaicin cream to BL feet QID - Avoid use on damaged, broken, or irritated skin. Avoid occlusive dressing or heat   - Lidocaine patch to BL thighs   - PRN:; Oxycodone and Tylenol 650mg QID     #Morbid obesity  - Most recent weight 394lbs (5/7)     #GI/Bowel Mgmt   - Pantoprazole  - Bisacodyl 10mg at HS   - Miralax   - Naloxegal 25mg daily     #/Bladder Mgmt   #ELLA  #Urinary Retention--voiding per urethra  - Renvela 800mg TID  - Cr improving   - Bladder scan TID  - Flomax 0.8mg at HS     #FEN --Morbid obesity (BMI > 40).  - Diet - Regular + Thins  [CCHO]    - MVI     # Health Management:   Environmental challenge affecting dc plan--narrow door and need for ramp to house entrance  However, patient making goal directed effort towards therapy goals     #Precautions / PROPHYLAXIS:   - Falls  - ortho: Weight bearing status: WBAT in surgical shoe   - Lungs: Aspiration, Incentive Spirometer   - DVT: Lovenox  ----------------------------------------------  IDT 5/13  Sit to stand mod assist x 1 T/f bed to  Bussy board mod assist x 1  Took 4 steps on parallel bars with mo assist x 2  Barrier--leakage from sides of vac dressing  LE weakness  Ext 5/16 revised to 5/30 (interval left hip pain, leakage from sides of VAC, environmental issue at home--needs time to optimize function and ensure safe community dc, making goal directed progress)  ----------------------------------------------  Dr. CANTU's Liaison with Family/Providers:    5/15--Father present at bedside, participated during review, discussed treatment plan, dc planning,  5/14--vac removed, currently on wet to dry dressing  (VAC dressing not staying for prolonged period due to location od sacral ulcer)    -----------------------------------------------  OUTPATIENT/FOLLOW UP:    Your Primary Care Provider,   Phone: (   )    -  Fax: (   )    -  Follow Up Time: 1 week    [facility] [place],   [address] [place] [address]  Phone: [phone]  Fax: (   )    -  Follow Up Time:    Dr. Waterhouse  Podiatry  [phone]  Within 1 week   -----------------------------------------------    39 year old male with PMH of morbid obesity, BEA on CPAP, pAFIB (not on AC), HTN, and BL papilledema attributed to pseudotumor cerebri; who initially presented to  on 2/24 with SOB and BL LE edema. Found to have URI with progressive SOB and multifocal PNA on imaging. His hospital course was complicated by worsening respiratory distress and increased 02 demands secondary to secretions (requiring multiple intubation attempts) and eventual MICU admission. While in MICU, he 02 requirements continued despite optimal vent management. Concern noted for progressive ARDS, unable to prone given morbid obesity, and ultimately cannulated for vvECMO on 2/25 and transferred to [facility] for further management on 2/26.  His hospital course at [facility] was significant for the following: diuretic and ABX management, s/p trache and PEG (placed on 3/7- PEG since removed), RCU admission, high grade fevers (secondary to panniculitis), progressively worsening sacral ulcer (likely osteomyelitis- s/p surgical debridement), BL foot wound (secondary to pressor use), neuropathy (secondary to critical illness polyneuropathy), ELLA (secondary to vancomycin toxicity and overdiuresis- since DCd- with improvement). Patient now admitted for a multidisciplinary rehab program. 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  (present on admission)   - Gait Instability, ADL impairments and Functional impairments: start Comprehensive Rehab Program of PT/OT  - 3 hours a day, 5 days a week   - PRN: Nebulizer QID     #BEA on BIPAP  --via nasal canula qhs    #Sacral Osteomyelitis  - Zosyn TID - completed on 5/12/25    #Paroxysmal AFIB  - Eliquis 5mg BID     #Calf Tenderness  - BL LE doppler negative     #L hip pain - resolved  - limiting therapy progress   - Celebrex 200mg BID - completed  on 5/14  - no acute findings on XR or CT   - L hip MRI (5/7) no significant findings     #HTN  - Amlodipine 10mg dialy     #Sleep/Mood  - Melatonin 9mg at HS   - Quetiapine 25mg at HS     #Skin  Wound Recs per Plastic Surgery (5/14):  Sacral Pressure Injury: BID- Cleanse with NS, remove excess fluid, apply santyl ointment to sloughing tissues, pack wound with 1-inch dry plain packing, cover with 4 inch gauze and abd combine  - R buttock: BID- Cleanse with NS, overlay calcium alginate, ABD combine dressing  Additional wound care instructions:  -->Right anterior thigh to groin isolated wound: Clean wound and periwound skin with NS. Pat dry. Apply liquid barrier film to periwound skin, allow to dry. Cover with silicone foam with border. Change daily or PRN if soiled   --> Bilateral abdominal pannus, bilateral groin: Cleanse with luke-warm soap and water, dry well. Apply clotrimazole  --> Bilateral feet: Apply betadine  to bilateral foot wounds followed by 4x4 gauze, ABD pad, and matilde daily per podiatry.  --> Continue to offload pressure; bariatric low airloss support surface, turn and position per protocol with use of fluidized positioning devices, continue incontinence and moisture care per protocol and use of single breathable incontinence pads, continue to offload heels with fluidized positioning devices. Continue use of bariatric seat cushion, limit sit time- no more than 2 consecutive hours at any given time.  - Pressure injury/Skin: OOB to Chair, PT/OT    - Ammonium lactate to BL LEs     #Neuropathy  - Cymbalta 60mg daily   - Gabapentin 1200mg TID     #Pain Mgmt   - Capsaicin cream to BL feet QID - Avoid use on damaged, broken, or irritated skin. Avoid occlusive dressing or heat   - Lidocaine patch to BL thighs   - PRN:; Oxycodone and Tylenol 650mg QID     #Morbid obesity  - Most recent weight 394lbs (5/7)     #GI/Bowel Mgmt   - Pantoprazole  - Bisacodyl 10mg at HS   - Miralax   - Naloxegal 25mg daily     #/Bladder Mgmt   #ELLA  #Urinary Retention--voiding per urethra  - Renvela 800mg TID  - Cr improving   - Bladder scan TID  - Flomax 0.8mg at HS     #FEN --Morbid obesity (BMI > 40).  - Diet - Regular + Thins  [CCHO]    - MVI     # Health Management:   Environmental challenge affecting dc plan--narrow door and need for ramp to house entrance  However, patient making goal directed effort towards therapy goals     #Precautions / PROPHYLAXIS:   - Falls  - ortho: Weight bearing status: WBAT in surgical shoe   - Lungs: Aspiration, Incentive Spirometer   - DVT: Lovenox  ----------------------------------------------  IDT 5/13  Sit to stand mod assist x 1 T/f bed to  Bussy board mod assist x 1  Took 4 steps on parallel bars with mo assist x 2  Barrier--leakage from sides of vac dressing  LE weakness  Ext 5/16 revised to 5/30 (interval left hip pain, leakage from sides of VAC, environmental issue at home--needs time to optimize function and ensure safe community dc, making goal directed progress)  ----------------------------------------------  Dr. Holder Liaison with Family/Providers:      OUTPATIENT/FOLLOW UP:    Your Primary Care Provider,   Phone: (   )    -  Fax: (   )    -  Follow Up Time: 1 week    [facility],   [address] [place] [address]  Phone: [phone]  Fax: (   )    -  Follow Up Time:    Dr. Waterhouse  Podiatry  [phone]  Within 1 week   -----------------------------------------------

## 2025-05-16 NOTE — PROGRESS NOTE ADULT - ASSESSMENT
39 year old male with PMH of morbid obesity, BEA, pAFIB (not on AC), HTN, and BL papilledema attributed to pseudotumor cerebri; who initially presented to  on 2/24 with SOB and BL LE edema. Found to have URI with progressive SOB and multifocal PNA on imaging. His hospital course was complicated by worsening respiratory distress and increased 02 demands secondary to secretions (requiring multiple intubation attempts) and eventual MICU admission. While in MICU, he 02 requirements continued despite optimal vent management. Concern noted for progressive ARDS, unable to prone given morbid obesity, and ultimately cannulated for vvECMO on 2/25 and transferred to [facility] for further management on 2/26. His hospital course at [facility] was significant for the following: diuretic and ABX management, s/p trach and PEG (placed on 3/7- PEG since removed), RCU admission, high grade fevers (secondary to panniculitis), progressively worsening sacral ulcer (likely osteomyelitis- s/p surgical debridement), BL foot wound (secondary to pressor use), neuropathy (secondary to critical illness polyneuropathy), ELLA (secondary to vancomycin toxicity and overdiuresis- since DCd- with improvement). Patient now admitted for a multidisciplinary rehab program. 04-25-25     # left hip pain - Improving  - on celebrex. patient to avoid if pain is tolerable as risk of bleeding is high with Eliquis  - hip xray--> no fracture  - CT hip: Again seen is a sacral decubitus wound which extends superficial as well as involving the right gluteus musculature and the posterior L5 with small amount of fluid and gas within the tract. Limited involvement on this noncontrast CT. This likely corresponds to adjacent phlegmonous changes  - MRI left hip: Incompletely characterized sacral wound with associated ill-defined collection of fluid and soft tissue gas which extends to/involve the right gluteus zurdo muscle as at CT pelvis study from one day prior. No interval change in the high STIR signal within the bilateral gluteal and thigh musculature suggestive of a myositis as compared to prior MRI study. No fracture, osseous destruction or aggressive periosteal reaction. No osteomyelitis at the left hip. Edema-like signal along the right and left greater trochanteric regions, overall increased as compared to the prior MRI study from 4/11/2025. Findings are felt to be reactive in etiology..  - pain control  - pt/ot    # Abnormal CT A/P:   - 5/2: CT A/P: Question of pneumatosis in the cecum with no other evidence of bowel ischemia. Correlation with lactate levels and clinical exam would be helpful.  - Patient has no symptoms. Abdomen is benign. Tolerating PO  - 5/2: Surgery cx noted: no intervention. c/w PO. refer to bariatric surgery post-DC dr botello at [place] or Dr. Segovia at [place] [per patient request]  - 5/3 GI cx noted: no GI intervention planned. c/w PO    #Possible Sacral Osteomyelitis  - Continue Zosyn TID - last dose evening of 5/11/25  - ID noted    #Stage IV Sacral Decub Ulcer  #Bilateral Buttocks Wounds  #Bilateral Foot Wounds  -5/2 CT A/P: An air and fluid containing collection in the right gluteus zurdo muscle in continuity with a subcutaneous sacral collection. No obvious skin defect to suggest a decubitus ulcer.  -5/7: MRI left hip: Incompletely characterized sacral wound with associated ill-defined collection of fluid and soft tissue gas which extends to/involve the right gluteus zurdo muscle as at CT pelvis study from one day prior. No interval change in the high STIR signal within the bilateral gluteal and thigh musculature suggestive of a myositis as compared to prior MRI study. No fracture, osseous destruction or aggressive periosteal reaction. No osteomyelitis at the left hip. Edema-like signal along the right and left greater trochanteric regions, overall increased as compared to the prior MRI study from 4/11/2025. Findings are felt to be reactive in etiology..  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 RV is not well visualized, enlarged RV cavity size a/ reduced RV systolic function. No significant valvular disease.  No echocardiographic evidence of pulmonary hypertension.  -s/p Trach (decannulated 3/28) and PEG (removed 4/15)   -Saturating well on RA  -Continue duoneb PRN   -BIPAP QHS (FiO2 40%, 18/10) via nasal mask   -Fall precaution  -Outpatient pulm follow up     #Chronic AFIB  #Sinus Tachycardia  - Patient has intermittent tachycardia. Per patient, His HR  mostly above 110 even when he is not sick. follows with cardio OP. not interested in rate control meds at this time.   - 5/3: EKG: NSR@94 bpm  - c/w Eliquis   - TSH WNL 2/25  - Sinus tachycardia likely multifactorial including pain and deconditioning. Low suspicion for PE, no SOB/hypoxia and he is already on full AC    #RIJ and Bilateral LE DVT  -Duplex RUE 3/14 showed Acute, non-occlusive deep vein thrombosis noted within the right internal jugular vein. Superficial vein thrombosis noted within the right antecubital cephalic vein.  -Duplex LE 3/14 showed Acute, occlusive deep vein thromboses noted within the right and left peroneal veins at both mid calves. Age-indeterminate, occlusive deep vein thrombosis visualized within the left gastrocnemius vein at the proximal calf.  -Continue eliquis  -Repeat duplex 4/29 showed No evidence of deep venous thrombosis in either lower extremity.    #Normocytic Anemia   -Likely multifactorial  -H/H stable, no evidence of active bleed   -Monitor CBC     #HTN  -Continue Amlodipine  -Monitor BP     #History of Pseudotumor Cerebri   -Outpatient follow up     #ELLA (Improved)  -Baseline Cr appears to be ~0.8 range   -ELLA felt to be multifactorial in setting of cardiorenal w/ RVE, recurrent ELLA suspected due to vancomycin toxicity and diuresis   -Continue to encourage oral hydration   -Continue Renvela   -Low phos diet  -Monitor BMP    #Type 2 Diabetes  -HbA1C 6.7 on 2/25, Repeat HbA1C 4.9 on 4/28  -FS trend reviewed, has been within goal  -RAISS discontinued  -Monitor glucose on routine lab, controlled     #/Urinary Retention  -Flomax 5/1  -Monitor bladder scans     #DVT PPx - Eliquis    will follow  d/w rehab team

## 2025-05-16 NOTE — PROGRESS NOTE ADULT - SUBJECTIVE AND OBJECTIVE BOX
39 year old male with PMH of morbid obesity, BEA, pAFIB (not on AC), HTN, and BL papilledema attributed to pseudotumor cerebri; who initially presented to  on 2/24 with SOB and BL LE edema. Found to have URI with progressive SOB and multifocal PNA on imaging. His hospital course was complicated by worsening respiratory distress and increased 02 demands secondary to secretions (requiring multiple intubation attempts) and eventual MICU admission. While in MICU, he 02 requirements continued despite optimal vent management. Concern noted for progressive ARDS, unable to prone given morbid obesity, and ultimately cannulated for vvECMO on 2/25 and transferred to [facility] for further management on 2/26. His hospital course at [facility] was significant for the following: diuretic and ABX management, s/p trach and PEG (placed on 3/7- PEG since removed), RCU admission, high grade fevers (secondary to panniculitis), progressively worsening sacral ulcer (likely osteomyelitis- s/p surgical debridement), BL foot wound (secondary to pressor use), neuropathy (secondary to critical illness polyneuropathy), ELLA (secondary to vancomycin toxicity and overdiuresis- since DCd- with improvement)    Patient seen and examined at bedside. No acute events noted.     MEDICATIONS  (STANDING):  amLODIPine   Tablet 10 milliGRAM(s) Oral daily  ammonium lactate 12% Lotion 1 Application(s) Topical two times a day  apixaban 5 milliGRAM(s) Oral two times a day  ascorbic acid 500 milliGRAM(s) Oral daily  bisacodyl 10 milliGRAM(s) Oral at bedtime  capsaicin 0.025% Cream 1 Application(s) Topical four times a day  celecoxib 200 milliGRAM(s) Oral every 12 hours  clotrimazole 1% Cream 1 Application(s) Topical <User Schedule>  Dakins Solution - 1/4 Strength 1 Application(s) Topical two times a day  dextrose 5%. 1000 milliLiter(s) (50 mL/Hr) IV Continuous <Continuous>  dextrose 5%. 1000 milliLiter(s) (100 mL/Hr) IV Continuous <Continuous>  dextrose 50% Injectable 25 Gram(s) IV Push once  dextrose 50% Injectable 12.5 Gram(s) IV Push once  dextrose 50% Injectable 25 Gram(s) IV Push once  DULoxetine 60 milliGRAM(s) Oral daily  gabapentin 1200 milliGRAM(s) Oral every 8 hours  glucagon  Injectable 1 milliGRAM(s) IntraMuscular once  influenza   Vaccine 0.5 milliLiter(s) IntraMuscular once  lidocaine   4% Patch 1 Patch Transdermal daily  lidocaine   4% Patch 1 Patch Transdermal daily  magnesium oxide 400 milliGRAM(s) Oral three times a day with meals  melatonin 9 milliGRAM(s) Oral at bedtime  multivitamin 1 Tablet(s) Oral daily  mupirocin 2% Ointment 1 Application(s) Topical two times a day  naloxegol 25 milliGRAM(s) Oral daily  pantoprazole    Tablet 40 milliGRAM(s) Oral before breakfast  polyethylene glycol 3350 17 Gram(s) Oral two times a day  povidone iodine 10% Solution 1 Application(s) Topical daily  QUEtiapine 25 milliGRAM(s) Oral at bedtime  sevelamer carbonate 800 milliGRAM(s) Oral three times a day with meals  silver nitrate Applicator 6 Application(s) Topical once  tamsulosin 0.8 milliGRAM(s) Oral at bedtime  zinc sulfate 220 milliGRAM(s) Oral daily    MEDICATIONS  (PRN):  albuterol/ipratropium for Nebulization 3 milliLiter(s) Nebulizer every 6 hours PRN Shortness of Breath and/or Wheezing  dextrose Oral Gel 15 Gram(s) Oral once PRN Blood Glucose LESS THAN 70 milliGRAM(s)/deciliter  oxyCODONE    IR 20 milliGRAM(s) Oral every 6 hours PRN Severe Pain (7 - 10)  oxyCODONE    IR 15 milliGRAM(s) Oral two times a day PRN Moderate Pain (4 - 6)  oxyCODONE    IR 10 milliGRAM(s) Oral two times a day PRN Mild Pain (1 - 3)    Vital Signs Last 24 Hrs  T(C): 36.8 (16 May 2025 08:06), Max: 36.8 (15 May 2025 21:55)  T(F): 98.3 (16 May 2025 08:06), Max: 98.3 (15 May 2025 21:55)  HR: 75 (16 May 2025 08:06) (75 - 91)  BP: 120/78 (16 May 2025 08:06) (116/74 - 120/78)  BP(mean): --  RR: 16 (16 May 2025 08:06) (16 - 18)  SpO2: 95% (16 May 2025 08:06) (94% - 98%)    Parameters below as of 16 May 2025 08:06  Patient On (Oxygen Delivery Method): room air    GENERAL- NAD, morbid obesity +  EAR/NOSE/MOUTH/THROAT -  MMM  EYES- MOLLY, conjunctiva and Sclera clear  NECK- supple  RESPIRATORY-  clear to auscultation bilaterally  CARDIOVASCULAR - SIS2, RRR  GI - soft NT BS present  EXTREMITIES- LE edema  NEUROLOGY-  b/l LE weakness   PSYCHIATRY- AAO X 3    LABS:                          8.7    5.87  )-----------( 337      ( 15 May 2025 06:59 )             29.6   05-15    139  |  104  |  12  ----------------------------<  88  3.6   |  29  |  1.10    Ca    9.4      15 May 2025 06:59    TPro  6.9  /  Alb  2.2[L]  /  TBili  0.4  /  DBili  x   /  AST  13  /  ALT  9[L]  /  AlkPhos  55  05-15    RADIOLOGY & ADDITIONAL TESTS:    Consultant(s) Notes Reviewed:  [x ] YES  [ ] NO  Care Discussed with Consultants/Other Providers [ x] YES  [ ] NO  Imaging Personally Reviewed:  [x ] YES  [ ] NO

## 2025-05-16 NOTE — PROGRESS NOTE ADULT - SUBJECTIVE AND OBJECTIVE BOX
Subjective  Patient seen and examined this AM.   Admits to sleeping well.  Discussed IV removal and additional gauze over BL feet wounds.     ROS--no acute pain, no fever or chest pain   B/l leg and sacral ulcers  LBM 5/15    Function   -Ambulated 10' in parallel bars needing MOD A of 2 persons, 1 for anterior  guarding and blocking at knees and assist of second at side to provide upright  support. Wheelchair follow used. Initially provided blocking of knees in stance  phases, progressively backed off with good stance control noted. Excessive use  of UEs to weight bear through parallel bars to offload LEs.  -Tolerated 6 walking trials with extended rest breaks due to SOB and fatigue.      Vital Signs Last 24 Hrs  T(C): 36.8 (16 May 2025 08:06), Max: 36.8 (15 May 2025 21:55)  T(F): 98.3 (16 May 2025 08:06), Max: 98.3 (15 May 2025 21:55)  HR: 75 (16 May 2025 08:06) (75 - 91)  BP: 120/78 (16 May 2025 08:06) (116/74 - 120/78)  BP(mean): --  RR: 16 (16 May 2025 08:06) (16 - 18)  SpO2: 95% (16 May 2025 08:06) (94% - 98%)      EXAM  Gen - Comfortable,   HEENT - NAD  Neck - Supple, No limited ROM  Pulm - Clear  Cardiovascular - RRR, S1S2, No murmurs  Abdomen - Soft, non tender, +BS  Extremities - chronic skin changes consistent with elephantiasis, midline present RUE  Edema reducing   leakage from sides of sacral wound vac    Neuro-  AAO X 3, Speech is normal     Motor - UE 5/5                    LEFT    LE - HF 3/5, KE 3/5 ankle 2/5                    RIGHT LE - HF 3/5, KE 3/5 ankle 2/5      Sensory - Decreased to light touch bilateral lower extremities      Coordination - impaired lower extremities   Psychiatric - Mood stable, Affect WNL    Skin:  R foot plantar aspect lateral/distal side 7cm x 5.5cm ischemic wound from pressors  RLE 4th digit 1x1.5cm ischemic induced wound from pressors  RLE 5th digit 3x2cm ischemic induced wound from pressors  L foot plantar aspect lateral/distal side 6x7cm ischemic induced wound from pressor usage  LLE 4th digit 2x1cm ischemic induced wound from pressors  LLE 5th digit 2.5x3cm ischemic induced wound from pressors   R groin skin tear 1x1.5cm  Unstageable pressure wound cocyx 7cmx4.5cm w/ 2.5cm depth  R buttock pressure induced wound semi contiguous with coccyx wound calling unstageable given prior debridement 8.5x5.5cm  L buttock 1.0x0.5cm stage 3 pressure injury  R buttock skin tear 3.5cm x 0.5cm and 1.5cmx0.5cm    MMS--antigravity upper extremities        RECENT LABS/IMAGING                        8.7    5.87  )-----------( 337      ( 15 May 2025 06:59 )             29.6     05-15    139  |  104  |  12  ----------------------------<  88  3.6   |  29  |  1.10    Ca    9.4      15 May 2025 06:59    TPro  6.9  /  Alb  2.2[L]  /  TBili  0.4  /  DBili  x   /  AST  13  /  ALT  9[L]  /  AlkPhos  55  05-15      Urinalysis Basic - ( 15 May 2025 06:59 )    Color: x / Appearance: x / SG: x / pH: x  Gluc: 88 mg/dL / Ketone: x  / Bili: x / Urobili: x   Blood: x / Protein: x / Nitrite: x   Leuk Esterase: x / RBC: x / WBC x   Sq Epi: x / Non Sq Epi: x / Bacteria: x        MEDICATIONS  (STANDING):  amLODIPine   Tablet 10 milliGRAM(s) Oral daily  ammonium lactate 12% Lotion 1 Application(s) Topical two times a day  apixaban 5 milliGRAM(s) Oral two times a day  ascorbic acid 500 milliGRAM(s) Oral daily  bisacodyl 10 milliGRAM(s) Oral at bedtime  capsaicin 0.025% Cream 1 Application(s) Topical four times a day  clotrimazole 1% Cream 1 Application(s) Topical <User Schedule>  collagenase Ointment 1 Application(s) Topical two times a day  Dakins Solution - 1/4 Strength 1 Application(s) Topical two times a day  dextrose 5%. 1000 milliLiter(s) (50 mL/Hr) IV Continuous <Continuous>  dextrose 5%. 1000 milliLiter(s) (100 mL/Hr) IV Continuous <Continuous>  dextrose 50% Injectable 25 Gram(s) IV Push once  dextrose 50% Injectable 12.5 Gram(s) IV Push once  dextrose 50% Injectable 25 Gram(s) IV Push once  DULoxetine 60 milliGRAM(s) Oral daily  gabapentin 1200 milliGRAM(s) Oral every 8 hours  glucagon  Injectable 1 milliGRAM(s) IntraMuscular once  influenza   Vaccine 0.5 milliLiter(s) IntraMuscular once  lidocaine   4% Patch 1 Patch Transdermal daily  lidocaine   4% Patch 1 Patch Transdermal daily  magnesium oxide 400 milliGRAM(s) Oral three times a day with meals  melatonin 9 milliGRAM(s) Oral at bedtime  multivitamin 1 Tablet(s) Oral daily  mupirocin 2% Ointment 1 Application(s) Topical two times a day  naloxegol 25 milliGRAM(s) Oral daily  pantoprazole    Tablet 40 milliGRAM(s) Oral before breakfast  petrolatum white Ointment 1 Application(s) Topical every 8 hours  polyethylene glycol 3350 17 Gram(s) Oral two times a day  povidone iodine 10% Solution 1 Application(s) Topical daily  QUEtiapine 25 milliGRAM(s) Oral at bedtime  sevelamer carbonate 800 milliGRAM(s) Oral three times a day with meals  silver nitrate Applicator 6 Application(s) Topical once  tamsulosin 0.8 milliGRAM(s) Oral at bedtime  zinc sulfate 220 milliGRAM(s) Oral daily    MEDICATIONS  (PRN):  albuterol/ipratropium for Nebulization 3 milliLiter(s) Nebulizer every 6 hours PRN Shortness of Breath and/or Wheezing  dextrose Oral Gel 15 Gram(s) Oral once PRN Blood Glucose LESS THAN 70 milliGRAM(s)/deciliter  oxyCODONE    IR 20 milliGRAM(s) Oral every 6 hours PRN Severe Pain (7 - 10)  oxyCODONE    IR 15 milliGRAM(s) Oral two times a day PRN Moderate Pain (4 - 6)  oxyCODONE    IR 10 milliGRAM(s) Oral two times a day PRN Mild Pain (1 - 3)     Subjective  Patient seen and examined this AM. and observed in therapy  Reports pain at soul of foot when standing  Admits to sleeping well.  Discussed IV removal and additional gauze over BL feet wounds.     ROS--no acute pain, no fever or chest pain   B/l leg and sacral ulcers  LBM 5/15    Function --increased ambulatory distance  -Ambulated 15' with bariatric RW needing MOD A x 2 on each side to support  trunk/pelvis extension to maintain upright posture and close wheelchair follow  for safety. Modified 3 point step to gait leading with Right LE. Decreased  clearance/step length noted landing in foot flat positioning.  Distances limited  by pain and fatigue requiring extended seated rest breaks.  -Tolerated 2 walks in total with recovery time      Vital Signs Last 24 Hrs  T(C): 36.8 (16 May 2025 08:06), Max: 36.8 (15 May 2025 21:55)  T(F): 98.3 (16 May 2025 08:06), Max: 98.3 (15 May 2025 21:55)  HR: 75 (16 May 2025 08:06) (75 - 91)  BP: 120/78 (16 May 2025 08:06) (116/74 - 120/78)  BP(mean): --  RR: 16 (16 May 2025 08:06) (16 - 18)  SpO2: 95% (16 May 2025 08:06) (94% - 98%)      EXAM  Gen - Comfortable,   HEENT - NAD  Neck - Supple, No limited ROM  Pulm - Clear  Cardiovascular - RRR, S1S2, No murmurs  Abdomen - Soft, non tender, +BS  Extremities - chronic skin changes with hyperpigmentation at shin      Neuro-  AAO X 3, Speech is normal     Motor - UE 5/5                    LEFT    LE - HF 3/5, KE 3/5 ankle 2/5                    RIGHT LE - HF 3/5, KE 3/5 ankle 2/5      Sensory - Decreased to light touch bilateral lower extremities      Coordination - impaired lower extremities   Psychiatric - Mood stable, Affect WNL    Skin:  R foot plantar aspect lateral/distal side 7cm x 5.5cm ischemic wound from pressors  RLE 4th digit 1x1.5cm ischemic induced wound from pressors  RLE 5th digit 3x2cm ischemic induced wound from pressors  L foot plantar aspect lateral/distal side 6x7cm ischemic induced wound from pressor usage  LLE 4th digit 2x1cm ischemic induced wound from pressors  LLE 5th digit 2.5x3cm ischemic induced wound from pressors   R groin skin tear 1x1.5cm  Unstageable pressure wound cocyx 7cmx4.5cm w/ 2.5cm depth  R buttock pressure induced wound semi contiguous with coccyx wound calling unstageable given prior debridement 8.5x5.5cm  L buttock 1.0x0.5cm stage 3 pressure injury  R buttock skin tear 3.5cm x 0.5cm and 1.5cmx0.5cm    MMS--antigravity upper extremities        RECENT LABS/IMAGING                        8.7    5.87  )-----------( 337      ( 15 May 2025 06:59 )             29.6     05-15    139  |  104  |  12  ----------------------------<  88  3.6   |  29  |  1.10    Ca    9.4      15 May 2025 06:59    TPro  6.9  /  Alb  2.2[L]  /  TBili  0.4  /  DBili  x   /  AST  13  /  ALT  9[L]  /  AlkPhos  55  05-15      Urinalysis Basic - ( 15 May 2025 06:59 )    Color: x / Appearance: x / SG: x / pH: x  Gluc: 88 mg/dL / Ketone: x  / Bili: x / Urobili: x   Blood: x / Protein: x / Nitrite: x   Leuk Esterase: x / RBC: x / WBC x   Sq Epi: x / Non Sq Epi: x / Bacteria: x        MEDICATIONS  (STANDING):  amLODIPine   Tablet 10 milliGRAM(s) Oral daily  ammonium lactate 12% Lotion 1 Application(s) Topical two times a day  apixaban 5 milliGRAM(s) Oral two times a day  ascorbic acid 500 milliGRAM(s) Oral daily  bisacodyl 10 milliGRAM(s) Oral at bedtime  capsaicin 0.025% Cream 1 Application(s) Topical four times a day  clotrimazole 1% Cream 1 Application(s) Topical <User Schedule>  collagenase Ointment 1 Application(s) Topical two times a day  Dakins Solution - 1/4 Strength 1 Application(s) Topical two times a day  dextrose 5%. 1000 milliLiter(s) (50 mL/Hr) IV Continuous <Continuous>  dextrose 5%. 1000 milliLiter(s) (100 mL/Hr) IV Continuous <Continuous>  dextrose 50% Injectable 25 Gram(s) IV Push once  dextrose 50% Injectable 12.5 Gram(s) IV Push once  dextrose 50% Injectable 25 Gram(s) IV Push once  DULoxetine 60 milliGRAM(s) Oral daily  gabapentin 1200 milliGRAM(s) Oral every 8 hours  glucagon  Injectable 1 milliGRAM(s) IntraMuscular once  influenza   Vaccine 0.5 milliLiter(s) IntraMuscular once  lidocaine   4% Patch 1 Patch Transdermal daily  lidocaine   4% Patch 1 Patch Transdermal daily  magnesium oxide 400 milliGRAM(s) Oral three times a day with meals  melatonin 9 milliGRAM(s) Oral at bedtime  multivitamin 1 Tablet(s) Oral daily  mupirocin 2% Ointment 1 Application(s) Topical two times a day  naloxegol 25 milliGRAM(s) Oral daily  pantoprazole    Tablet 40 milliGRAM(s) Oral before breakfast  petrolatum white Ointment 1 Application(s) Topical every 8 hours  polyethylene glycol 3350 17 Gram(s) Oral two times a day  povidone iodine 10% Solution 1 Application(s) Topical daily  QUEtiapine 25 milliGRAM(s) Oral at bedtime  sevelamer carbonate 800 milliGRAM(s) Oral three times a day with meals  silver nitrate Applicator 6 Application(s) Topical once  tamsulosin 0.8 milliGRAM(s) Oral at bedtime  zinc sulfate 220 milliGRAM(s) Oral daily    MEDICATIONS  (PRN):  albuterol/ipratropium for Nebulization 3 milliLiter(s) Nebulizer every 6 hours PRN Shortness of Breath and/or Wheezing  dextrose Oral Gel 15 Gram(s) Oral once PRN Blood Glucose LESS THAN 70 milliGRAM(s)/deciliter  oxyCODONE    IR 20 milliGRAM(s) Oral every 6 hours PRN Severe Pain (7 - 10)  oxyCODONE    IR 15 milliGRAM(s) Oral two times a day PRN Moderate Pain (4 - 6)  oxyCODONE    IR 10 milliGRAM(s) Oral two times a day PRN Mild Pain (1 - 3)

## 2025-05-17 PROCEDURE — 99232 SBSQ HOSP IP/OBS MODERATE 35: CPT

## 2025-05-17 RX ADMIN — GABAPENTIN 1200 MILLIGRAM(S): 400 CAPSULE ORAL at 13:58

## 2025-05-17 RX ADMIN — GABAPENTIN 1200 MILLIGRAM(S): 400 CAPSULE ORAL at 21:23

## 2025-05-17 RX ADMIN — OXYCODONE HYDROCHLORIDE 20 MILLIGRAM(S): 30 TABLET ORAL at 14:58

## 2025-05-17 RX ADMIN — Medication 400 MILLIGRAM(S): at 17:48

## 2025-05-17 RX ADMIN — Medication 400 MILLIGRAM(S): at 12:20

## 2025-05-17 RX ADMIN — Medication 1 APPLICATION(S): at 12:21

## 2025-05-17 RX ADMIN — Medication 1 APPLICATION(S): at 08:35

## 2025-05-17 RX ADMIN — WHITE PETROLATUM 1 APPLICATION(S): 1 OINTMENT TOPICAL at 13:55

## 2025-05-17 RX ADMIN — MUPIROCIN CALCIUM 1 APPLICATION(S): 20 CREAM TOPICAL at 17:28

## 2025-05-17 RX ADMIN — Medication 1 APPLICATION(S): at 17:27

## 2025-05-17 RX ADMIN — SEVELAMER HYDROCHLORIDE 800 MILLIGRAM(S): 800 TABLET ORAL at 12:19

## 2025-05-17 RX ADMIN — APIXABAN 5 MILLIGRAM(S): 2.5 TABLET, FILM COATED ORAL at 17:48

## 2025-05-17 RX ADMIN — QUETIAPINE FUMARATE 25 MILLIGRAM(S): 25 TABLET ORAL at 21:23

## 2025-05-17 RX ADMIN — MUPIROCIN CALCIUM 1 APPLICATION(S): 20 CREAM TOPICAL at 08:36

## 2025-05-17 RX ADMIN — Medication 500 MILLIGRAM(S): at 12:20

## 2025-05-17 RX ADMIN — SEVELAMER HYDROCHLORIDE 800 MILLIGRAM(S): 800 TABLET ORAL at 08:34

## 2025-05-17 RX ADMIN — Medication 1 TABLET(S): at 12:20

## 2025-05-17 RX ADMIN — TAMSULOSIN HYDROCHLORIDE 0.8 MILLIGRAM(S): 0.4 CAPSULE ORAL at 21:23

## 2025-05-17 RX ADMIN — AMLODIPINE BESYLATE 10 MILLIGRAM(S): 10 TABLET ORAL at 06:30

## 2025-05-17 RX ADMIN — APIXABAN 5 MILLIGRAM(S): 2.5 TABLET, FILM COATED ORAL at 06:30

## 2025-05-17 RX ADMIN — GABAPENTIN 1200 MILLIGRAM(S): 400 CAPSULE ORAL at 06:30

## 2025-05-17 RX ADMIN — SEVELAMER HYDROCHLORIDE 800 MILLIGRAM(S): 800 TABLET ORAL at 17:48

## 2025-05-17 RX ADMIN — OXYCODONE HYDROCHLORIDE 20 MILLIGRAM(S): 30 TABLET ORAL at 21:23

## 2025-05-17 RX ADMIN — SODIUM HYPOCHLORITE 1 APPLICATION(S): 0.12 SOLUTION TOPICAL at 17:28

## 2025-05-17 RX ADMIN — Medication 9 MILLIGRAM(S): at 21:23

## 2025-05-17 RX ADMIN — WHITE PETROLATUM 1 APPLICATION(S): 1 OINTMENT TOPICAL at 21:28

## 2025-05-17 RX ADMIN — Medication 400 MILLIGRAM(S): at 08:34

## 2025-05-17 RX ADMIN — SODIUM HYPOCHLORITE 1 APPLICATION(S): 0.12 SOLUTION TOPICAL at 08:34

## 2025-05-17 RX ADMIN — CLOTRIMAZOLE 1 APPLICATION(S): 1 CREAM TOPICAL at 12:23

## 2025-05-17 RX ADMIN — WHITE PETROLATUM 1 APPLICATION(S): 1 OINTMENT TOPICAL at 08:36

## 2025-05-17 RX ADMIN — Medication 40 MILLIGRAM(S): at 06:30

## 2025-05-17 RX ADMIN — Medication 220 MILLIGRAM(S): at 12:20

## 2025-05-17 RX ADMIN — OXYCODONE HYDROCHLORIDE 20 MILLIGRAM(S): 30 TABLET ORAL at 13:58

## 2025-05-17 RX ADMIN — Medication 1 APPLICATION(S): at 17:28

## 2025-05-17 RX ADMIN — OXYCODONE HYDROCHLORIDE 20 MILLIGRAM(S): 30 TABLET ORAL at 22:23

## 2025-05-17 RX ADMIN — NALOXEGOL OXALATE 25 MILLIGRAM(S): 12.5 TABLET, FILM COATED ORAL at 12:19

## 2025-05-17 RX ADMIN — DULOXETINE 60 MILLIGRAM(S): 20 CAPSULE, DELAYED RELEASE ORAL at 12:19

## 2025-05-17 NOTE — PROGRESS NOTE ADULT - SUBJECTIVE AND OBJECTIVE BOX
Chief complaint: no new complaints, no acute events overnight     Patient is a 39y old  Male who presents with a chief complaint of Debility secondary to acute hypoxic respiratory failure (16 May 2025 12:46)    HEALTH ISSUES - PROBLEM Dx:  Abnormal finding on CT scan      PAST MEDICAL & SURGICAL HISTORY:  HTN (hypertension)      Atrial fibrillation  paroxysmal      Papilledema of both eyes      Chronic sinusitis      Morbid obesity      No significant past surgical history      VITALS  Vital Signs Last 24 Hrs  T(C): 36.3 (17 May 2025 08:27), Max: 37.2 (16 May 2025 20:07)  T(F): 97.4 (17 May 2025 08:27), Max: 98.9 (16 May 2025 20:07)  HR: 101 (17 May 2025 08:27) (100 - 108)  BP: 126/87 (17 May 2025 08:27) (126/87 - 135/84)  BP(mean): --  RR: 16 (17 May 2025 08:27) (16 - 16)  SpO2: 94% (17 May 2025 08:27) (94% - 99%)    Parameters below as of 17 May 2025 08:27  Patient On (Oxygen Delivery Method): room air          PHYSICAL EXAM  Constitutional - NAD, Comfortable  HEENT - NCAT, EOMI  Neck - Supple, No limited ROM  Chest - CTA bilaterally  Cardiovascular - RRR, S1S2  Abdomen -  Soft, NTND  Extremities -  No calf tenderness            CURRENT MEDICATIONS    MEDICATIONS  (STANDING):  amLODIPine   Tablet 10 milliGRAM(s) Oral daily  ammonium lactate 12% Lotion 1 Application(s) Topical two times a day  apixaban 5 milliGRAM(s) Oral two times a day  ascorbic acid 500 milliGRAM(s) Oral daily  bisacodyl 10 milliGRAM(s) Oral at bedtime  capsaicin 0.025% Cream 1 Application(s) Topical four times a day  clotrimazole 1% Cream 1 Application(s) Topical <User Schedule>  collagenase Ointment 1 Application(s) Topical two times a day  Dakins Solution - 1/4 Strength 1 Application(s) Topical two times a day  dextrose 5%. 1000 milliLiter(s) (100 mL/Hr) IV Continuous <Continuous>  dextrose 5%. 1000 milliLiter(s) (50 mL/Hr) IV Continuous <Continuous>  dextrose 50% Injectable 25 Gram(s) IV Push once  dextrose 50% Injectable 12.5 Gram(s) IV Push once  dextrose 50% Injectable 25 Gram(s) IV Push once  DULoxetine 60 milliGRAM(s) Oral daily  gabapentin 1200 milliGRAM(s) Oral every 8 hours  glucagon  Injectable 1 milliGRAM(s) IntraMuscular once  influenza   Vaccine 0.5 milliLiter(s) IntraMuscular once  lidocaine   4% Patch 1 Patch Transdermal daily  lidocaine   4% Patch 1 Patch Transdermal daily  magnesium oxide 400 milliGRAM(s) Oral three times a day with meals  melatonin 9 milliGRAM(s) Oral at bedtime  multivitamin 1 Tablet(s) Oral daily  mupirocin 2% Ointment 1 Application(s) Topical two times a day  naloxegol 25 milliGRAM(s) Oral daily  pantoprazole    Tablet 40 milliGRAM(s) Oral before breakfast  petrolatum white Ointment 1 Application(s) Topical every 8 hours  polyethylene glycol 3350 17 Gram(s) Oral two times a day  povidone iodine 10% Solution 1 Application(s) Topical daily  QUEtiapine 25 milliGRAM(s) Oral at bedtime  sevelamer carbonate 800 milliGRAM(s) Oral three times a day with meals  silver nitrate Applicator 6 Application(s) Topical once  tamsulosin 0.8 milliGRAM(s) Oral at bedtime  zinc sulfate 220 milliGRAM(s) Oral daily    MEDICATIONS  (PRN):  albuterol/ipratropium for Nebulization 3 milliLiter(s) Nebulizer every 6 hours PRN Shortness of Breath and/or Wheezing  dextrose Oral Gel 15 Gram(s) Oral once PRN Blood Glucose LESS THAN 70 milliGRAM(s)/deciliter  oxyCODONE    IR 20 milliGRAM(s) Oral every 6 hours PRN Severe Pain (7 - 10)  oxyCODONE    IR 15 milliGRAM(s) Oral two times a day PRN Moderate Pain (4 - 6)  oxyCODONE    IR 10 milliGRAM(s) Oral two times a day PRN Mild Pain (1 - 3)    ASSESSMENT & PLAN          GI/Bowel Management   / Bladder Management   Skin - Turn Q2  Pain control as needed  DVT PPX - Apixaban   Hospitalist is following       Continue comprehensive acute rehab program consisting of 3hrs/day of OT/PT and SLP.

## 2025-05-18 PROCEDURE — 99232 SBSQ HOSP IP/OBS MODERATE 35: CPT

## 2025-05-18 RX ADMIN — Medication 400 MILLIGRAM(S): at 18:45

## 2025-05-18 RX ADMIN — NALOXEGOL OXALATE 25 MILLIGRAM(S): 12.5 TABLET, FILM COATED ORAL at 13:11

## 2025-05-18 RX ADMIN — GABAPENTIN 1200 MILLIGRAM(S): 400 CAPSULE ORAL at 06:09

## 2025-05-18 RX ADMIN — SEVELAMER HYDROCHLORIDE 800 MILLIGRAM(S): 800 TABLET ORAL at 08:46

## 2025-05-18 RX ADMIN — SEVELAMER HYDROCHLORIDE 800 MILLIGRAM(S): 800 TABLET ORAL at 18:45

## 2025-05-18 RX ADMIN — WHITE PETROLATUM 1 APPLICATION(S): 1 OINTMENT TOPICAL at 06:12

## 2025-05-18 RX ADMIN — Medication 220 MILLIGRAM(S): at 13:10

## 2025-05-18 RX ADMIN — Medication 1 TABLET(S): at 13:10

## 2025-05-18 RX ADMIN — APIXABAN 5 MILLIGRAM(S): 2.5 TABLET, FILM COATED ORAL at 18:45

## 2025-05-18 RX ADMIN — Medication 9 MILLIGRAM(S): at 22:38

## 2025-05-18 RX ADMIN — Medication 500 MILLIGRAM(S): at 13:10

## 2025-05-18 RX ADMIN — OXYCODONE HYDROCHLORIDE 20 MILLIGRAM(S): 30 TABLET ORAL at 22:39

## 2025-05-18 RX ADMIN — SEVELAMER HYDROCHLORIDE 800 MILLIGRAM(S): 800 TABLET ORAL at 13:10

## 2025-05-18 RX ADMIN — DULOXETINE 60 MILLIGRAM(S): 20 CAPSULE, DELAYED RELEASE ORAL at 13:10

## 2025-05-18 RX ADMIN — AMLODIPINE BESYLATE 10 MILLIGRAM(S): 10 TABLET ORAL at 06:09

## 2025-05-18 RX ADMIN — GABAPENTIN 1200 MILLIGRAM(S): 400 CAPSULE ORAL at 22:36

## 2025-05-18 RX ADMIN — TAMSULOSIN HYDROCHLORIDE 0.8 MILLIGRAM(S): 0.4 CAPSULE ORAL at 22:40

## 2025-05-18 RX ADMIN — Medication 400 MILLIGRAM(S): at 08:46

## 2025-05-18 RX ADMIN — GABAPENTIN 1200 MILLIGRAM(S): 400 CAPSULE ORAL at 13:13

## 2025-05-18 RX ADMIN — QUETIAPINE FUMARATE 25 MILLIGRAM(S): 25 TABLET ORAL at 22:38

## 2025-05-18 RX ADMIN — Medication 400 MILLIGRAM(S): at 13:10

## 2025-05-18 RX ADMIN — OXYCODONE HYDROCHLORIDE 20 MILLIGRAM(S): 30 TABLET ORAL at 23:39

## 2025-05-18 RX ADMIN — SODIUM HYPOCHLORITE 1 APPLICATION(S): 0.12 SOLUTION TOPICAL at 06:12

## 2025-05-18 RX ADMIN — APIXABAN 5 MILLIGRAM(S): 2.5 TABLET, FILM COATED ORAL at 06:10

## 2025-05-18 RX ADMIN — Medication 1 APPLICATION(S): at 06:12

## 2025-05-18 RX ADMIN — Medication 40 MILLIGRAM(S): at 06:09

## 2025-05-18 RX ADMIN — MUPIROCIN CALCIUM 1 APPLICATION(S): 20 CREAM TOPICAL at 06:12

## 2025-05-18 RX ADMIN — Medication 1 APPLICATION(S): at 06:11

## 2025-05-18 NOTE — PROGRESS NOTE ADULT - SUBJECTIVE AND OBJECTIVE BOX
Chief complaint: no new complaints, no acute events overnight     Patient is a 39y old  Male who presents with a chief complaint of Debility secondary to acute hypoxic respiratory failure (18 May 2025 11:41)      HEALTH ISSUES - PROBLEM Dx:  Abnormal finding on CT scan      PAST MEDICAL & SURGICAL HISTORY:  HTN (hypertension)      Atrial fibrillation  paroxysmal      Papilledema of both eyes      Chronic sinusitis      Morbid obesity      No significant past surgical history        VITALS  Vital Signs Last 24 Hrs  T(C): 36.3 (18 May 2025 07:55), Max: 37.3 (17 May 2025 20:42)  T(F): 97.3 (18 May 2025 07:55), Max: 99.2 (17 May 2025 20:42)  HR: 100 (18 May 2025 07:55) (100 - 105)  BP: 103/69 (18 May 2025 07:55) (103/69 - 124/75)  BP(mean): --  RR: 16 (18 May 2025 07:55) (16 - 16)  SpO2: 94% (18 May 2025 07:55) (94% - 99%)    Parameters below as of 18 May 2025 07:55  Patient On (Oxygen Delivery Method): room air          PHYSICAL EXAM  Constitutional - NAD, Comfortable  HEENT - NCAT, EOMI  Neck - Supple, No limited ROM  Chest - CTA bilaterally  Cardiovascular - RRR, S1S2  Abdomen -  Soft, NTND  Extremities -  No calf tenderness                   CURRENT MEDICATIONS    MEDICATIONS  (STANDING):  amLODIPine   Tablet 10 milliGRAM(s) Oral daily  ammonium lactate 12% Lotion 1 Application(s) Topical two times a day  apixaban 5 milliGRAM(s) Oral two times a day  ascorbic acid 500 milliGRAM(s) Oral daily  bisacodyl 10 milliGRAM(s) Oral at bedtime  capsaicin 0.025% Cream 1 Application(s) Topical four times a day  clotrimazole 1% Cream 1 Application(s) Topical <User Schedule>  collagenase Ointment 1 Application(s) Topical two times a day  Dakins Solution - 1/4 Strength 1 Application(s) Topical two times a day  dextrose 5%. 1000 milliLiter(s) (100 mL/Hr) IV Continuous <Continuous>  dextrose 5%. 1000 milliLiter(s) (50 mL/Hr) IV Continuous <Continuous>  dextrose 50% Injectable 25 Gram(s) IV Push once  dextrose 50% Injectable 12.5 Gram(s) IV Push once  dextrose 50% Injectable 25 Gram(s) IV Push once  DULoxetine 60 milliGRAM(s) Oral daily  gabapentin 1200 milliGRAM(s) Oral every 8 hours  glucagon  Injectable 1 milliGRAM(s) IntraMuscular once  influenza   Vaccine 0.5 milliLiter(s) IntraMuscular once  lidocaine   4% Patch 1 Patch Transdermal daily  lidocaine   4% Patch 1 Patch Transdermal daily  magnesium oxide 400 milliGRAM(s) Oral three times a day with meals  melatonin 9 milliGRAM(s) Oral at bedtime  multivitamin 1 Tablet(s) Oral daily  mupirocin 2% Ointment 1 Application(s) Topical two times a day  naloxegol 25 milliGRAM(s) Oral daily  pantoprazole    Tablet 40 milliGRAM(s) Oral before breakfast  petrolatum white Ointment 1 Application(s) Topical every 8 hours  polyethylene glycol 3350 17 Gram(s) Oral two times a day  povidone iodine 10% Solution 1 Application(s) Topical daily  QUEtiapine 25 milliGRAM(s) Oral at bedtime  sevelamer carbonate 800 milliGRAM(s) Oral three times a day with meals  silver nitrate Applicator 6 Application(s) Topical once  tamsulosin 0.8 milliGRAM(s) Oral at bedtime  zinc sulfate 220 milliGRAM(s) Oral daily    MEDICATIONS  (PRN):  albuterol/ipratropium for Nebulization 3 milliLiter(s) Nebulizer every 6 hours PRN Shortness of Breath and/or Wheezing  dextrose Oral Gel 15 Gram(s) Oral once PRN Blood Glucose LESS THAN 70 milliGRAM(s)/deciliter  oxyCODONE    IR 20 milliGRAM(s) Oral every 6 hours PRN Severe Pain (7 - 10)  oxyCODONE    IR 15 milliGRAM(s) Oral two times a day PRN Moderate Pain (4 - 6)  oxyCODONE    IR 10 milliGRAM(s) Oral two times a day PRN Mild Pain (1 - 3)    ASSESSMENT & PLAN          GI/Bowel Management   / Bladder Management   Skin - Turn Q2  Pain control as needed  DVT PPX - Apixaban   Hospitalist is following       Continue comprehensive acute rehab program consisting of 3hrs/day of OT/PT and SLP.

## 2025-05-18 NOTE — PROGRESS NOTE ADULT - SUBJECTIVE AND OBJECTIVE BOX
Patient is a 39y old  Male who presents with a chief complaint of Debility secondary to acute hypoxic respiratory failure (17 May 2025 13:07)    SUBJECTIVE / OVERNIGHT EVENTS: Patient seen and examined. Father at bedside. He denies any concerns. Pain well managed.     MEDICATIONS  (STANDING):  amLODIPine   Tablet 10 milliGRAM(s) Oral daily  ammonium lactate 12% Lotion 1 Application(s) Topical two times a day  apixaban 5 milliGRAM(s) Oral two times a day  ascorbic acid 500 milliGRAM(s) Oral daily  bisacodyl 10 milliGRAM(s) Oral at bedtime  capsaicin 0.025% Cream 1 Application(s) Topical four times a day  clotrimazole 1% Cream 1 Application(s) Topical <User Schedule>  collagenase Ointment 1 Application(s) Topical two times a day  Dakins Solution - 1/4 Strength 1 Application(s) Topical two times a day  dextrose 5%. 1000 milliLiter(s) (50 mL/Hr) IV Continuous <Continuous>  dextrose 5%. 1000 milliLiter(s) (100 mL/Hr) IV Continuous <Continuous>  dextrose 50% Injectable 25 Gram(s) IV Push once  dextrose 50% Injectable 12.5 Gram(s) IV Push once  dextrose 50% Injectable 25 Gram(s) IV Push once  DULoxetine 60 milliGRAM(s) Oral daily  gabapentin 1200 milliGRAM(s) Oral every 8 hours  glucagon  Injectable 1 milliGRAM(s) IntraMuscular once  influenza   Vaccine 0.5 milliLiter(s) IntraMuscular once  lidocaine   4% Patch 1 Patch Transdermal daily  lidocaine   4% Patch 1 Patch Transdermal daily  magnesium oxide 400 milliGRAM(s) Oral three times a day with meals  melatonin 9 milliGRAM(s) Oral at bedtime  multivitamin 1 Tablet(s) Oral daily  mupirocin 2% Ointment 1 Application(s) Topical two times a day  naloxegol 25 milliGRAM(s) Oral daily  pantoprazole    Tablet 40 milliGRAM(s) Oral before breakfast  petrolatum white Ointment 1 Application(s) Topical every 8 hours  polyethylene glycol 3350 17 Gram(s) Oral two times a day  povidone iodine 10% Solution 1 Application(s) Topical daily  QUEtiapine 25 milliGRAM(s) Oral at bedtime  sevelamer carbonate 800 milliGRAM(s) Oral three times a day with meals  silver nitrate Applicator 6 Application(s) Topical once  tamsulosin 0.8 milliGRAM(s) Oral at bedtime  zinc sulfate 220 milliGRAM(s) Oral daily    MEDICATIONS  (PRN):  albuterol/ipratropium for Nebulization 3 milliLiter(s) Nebulizer every 6 hours PRN Shortness of Breath and/or Wheezing  dextrose Oral Gel 15 Gram(s) Oral once PRN Blood Glucose LESS THAN 70 milliGRAM(s)/deciliter  oxyCODONE    IR 20 milliGRAM(s) Oral every 6 hours PRN Severe Pain (7 - 10)  oxyCODONE    IR 15 milliGRAM(s) Oral two times a day PRN Moderate Pain (4 - 6)  oxyCODONE    IR 10 milliGRAM(s) Oral two times a day PRN Mild Pain (1 - 3)    Vital Signs Last 24 Hrs  T(C): 36.3 (18 May 2025 07:55), Max: 37.3 (17 May 2025 20:42)  T(F): 97.3 (18 May 2025 07:55), Max: 99.2 (17 May 2025 20:42)  HR: 100 (18 May 2025 07:55) (100 - 105)  BP: 103/69 (18 May 2025 07:55) (103/69 - 124/75)  BP(mean): --  RR: 16 (18 May 2025 07:55) (16 - 16)  SpO2: 94% (18 May 2025 07:55) (94% - 99%)    Parameters below as of 18 May 2025 07:55  Patient On (Oxygen Delivery Method): room air      PHYSICAL EXAM:  GENERAL- NAD, morbid obesity +  EAR/NOSE/MOUTH/THROAT -  MMM  EYES- MOLLY, conjunctiva and Sclera clear  NECK- supple  RESPIRATORY-  clear to auscultation bilaterally  CARDIOVASCULAR - SIS2, RRR  GI - soft NT BS present  EXTREMITIES- LE edema  NEUROLOGY-  b/l LE weakness   PSYCHIATRY- AAO X 3      LABS:      RADIOLOGY & ADDITIONAL TESTS:    Imaging Personally Reviewed:    Consultant(s) Notes Reviewed:      Care Discussed with Consultants/Other Providers: rehab    Assessment and Plan:

## 2025-05-18 NOTE — PROGRESS NOTE ADULT - ASSESSMENT
39 year old male with PMH of morbid obesity, BEA, pAFIB (not on AC), HTN, and BL papilledema attributed to pseudotumor cerebri; who initially presented to  on 2/24 with SOB and BL LE edema. Found to have URI with progressive SOB and multifocal PNA on imaging. His hospital course was complicated by worsening respiratory distress and increased 02 demands secondary to secretions (requiring multiple intubation attempts) and eventual MICU admission. While in MICU, he 02 requirements continued despite optimal vent management. Concern noted for progressive ARDS, unable to prone given morbid obesity, and ultimately cannulated for vvECMO on 2/25 and transferred to [facility] for further management on 2/26. His hospital course at [facility] was significant for the following: diuretic and ABX management, s/p trach and PEG (placed on 3/7- PEG since removed), RCU admission, high grade fevers (secondary to panniculitis), progressively worsening sacral ulcer (likely osteomyelitis- s/p surgical debridement), BL foot wound (secondary to pressor use), neuropathy (secondary to critical illness polyneuropathy), ELLA (secondary to vancomycin toxicity and overdiuresis- since DCd- with improvement). Patient now admitted for a multidisciplinary rehab program. 04-25-25     # left hip pain - Improving  - on celebrex. patient to avoid if pain is tolerable as risk of bleeding is high with Eliquis  - hip xray--> no fracture  - CT hip: Again seen is a sacral decubitus wound which extends superficial as well as involving the right gluteus musculature and the posterior L5 with small amount of fluid and gas within the tract. Limited involvement on this noncontrast CT. This likely corresponds to adjacent phlegmonous changes  - MRI left hip: Incompletely characterized sacral wound with associated ill-defined collection of fluid and soft tissue gas which extends to/involve the right gluteus zurdo muscle as at CT pelvis study from one day prior. No interval change in the high STIR signal within the bilateral gluteal and thigh musculature suggestive of a myositis as compared to prior MRI study. No fracture, osseous destruction or aggressive periosteal reaction. No osteomyelitis at the left hip. Edema-like signal along the right and left greater trochanteric regions, overall increased as compared to the prior MRI study from 4/11/2025. Findings are felt to be reactive in etiology..  - pain control  - pt/ot    # Abnormal CT A/P:   - 5/2: CT A/P: Question of pneumatosis in the cecum with no other evidence of bowel ischemia. Correlation with lactate levels and clinical exam would be helpful.  - Patient has no symptoms. Abdomen is benign. Tolerating PO  - 5/2: Surgery cx noted: no intervention. c/w PO. refer to bariatric surgery post-DC dr botello at [place] or Dr. Segovia at [place] [per patient request]  - 5/3 GI cx noted: no GI intervention planned. c/w PO    #Stage IV Sacral Decub Ulcer  #Bilateral Buttocks Wounds  #Bilateral Foot Wounds  -5/2 CT A/P: An air and fluid containing collection in the right gluteus zurdo muscle in continuity with a subcutaneous sacral collection. No obvious skin defect to suggest a decubitus ulcer.  -5/7: MRI left hip: Incompletely characterized sacral wound with associated ill-defined collection of fluid and soft tissue gas which extends to/involve the right gluteus zurdo muscle as at CT pelvis study from one day prior. No interval change in the high STIR signal within the bilateral gluteal and thigh musculature suggestive of a myositis as compared to prior MRI study. No fracture, osseous destruction or aggressive periosteal reaction. No osteomyelitis at the left hip. Edema-like signal along the right and left greater trochanteric regions, overall increased as compared to the prior MRI study from 4/11/2025. Findings are felt to be reactive in etiology..  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 RV is not well visualized, enlarged RV cavity size a/ reduced RV systolic function. No significant valvular disease.  No echocardiographic evidence of pulmonary hypertension.  -s/p Trach (decannulated 3/28) and PEG (removed 4/15)   -Saturating well on RA  -Continue duoneb PRN   -BIPAP QHS (FiO2 40%, 18/10) via nasal mask   -Fall precaution  -Outpatient pulm follow up     #Chronic AFIB  #Sinus Tachycardia  - Patient has intermittent tachycardia. Per patient, His HR  mostly above 110 even when he is not sick. follows with cardio OP. not interested in rate control meds at this time.   - 5/3: EKG: NSR@94 bpm  - c/w Eliquis   - TSH WNL 2/25  - Sinus tachycardia likely multifactorial including pain and deconditioning. Low suspicion for PE, no SOB/hypoxia and he is already on full AC    #RIJ and Bilateral LE DVT  -Duplex RUE 3/14 showed Acute, non-occlusive deep vein thrombosis noted within the right internal jugular vein. Superficial vein thrombosis noted within the right antecubital cephalic vein.  -Duplex LE 3/14 showed Acute, occlusive deep vein thromboses noted within the right and left peroneal veins at both mid calves. Age-indeterminate, occlusive deep vein thrombosis visualized within the left gastrocnemius vein at the proximal calf.  -Continue eliquis  -Repeat duplex 4/29 showed No evidence of deep venous thrombosis in either lower extremity.    #Normocytic Anemia   -Likely multifactorial  -H/H stable, no evidence of active bleed   -Monitor CBC     #HTN  -Continue Amlodipine  -Monitor BP     #History of Pseudotumor Cerebri   -Outpatient follow up     #ELLA (Improved)  -Baseline Cr appears to be ~0.8 range   -ELLA felt to be multifactorial in setting of cardiorenal w/ RVE, recurrent ELLA suspected due to vancomycin toxicity and diuresis   -Continue to encourage oral hydration   -Continue Renvela   -Low phos diet  -Monitor BMP    #Type 2 Diabetes  -HbA1C 6.7 on 2/25, Repeat HbA1C 4.9 on 4/28  -FS trend reviewed, has been within goal  -RAISS discontinued  -Monitor glucose on routine lab, controlled     #/Urinary Retention  -Flomax 5/1  -Monitor bladder scans     #DVT PPx - Eliquis    will follow  d/w rehab team

## 2025-05-19 LAB
ALBUMIN SERPL ELPH-MCNC: 2.2 G/DL — LOW (ref 3.3–5)
ALP SERPL-CCNC: 52 U/L — SIGNIFICANT CHANGE UP (ref 40–120)
ALT FLD-CCNC: 12 U/L — SIGNIFICANT CHANGE UP (ref 10–45)
ANION GAP SERPL CALC-SCNC: 9 MMOL/L — SIGNIFICANT CHANGE UP (ref 5–17)
AST SERPL-CCNC: 12 U/L — SIGNIFICANT CHANGE UP (ref 10–40)
BASOPHILS # BLD AUTO: 0.02 K/UL — SIGNIFICANT CHANGE UP (ref 0–0.2)
BASOPHILS NFR BLD AUTO: 0.3 % — SIGNIFICANT CHANGE UP (ref 0–2)
BILIRUB SERPL-MCNC: 0.4 MG/DL — SIGNIFICANT CHANGE UP (ref 0.2–1.2)
BUN SERPL-MCNC: 9 MG/DL — SIGNIFICANT CHANGE UP (ref 7–23)
CALCIUM SERPL-MCNC: 9.3 MG/DL — SIGNIFICANT CHANGE UP (ref 8.4–10.5)
CHLORIDE SERPL-SCNC: 104 MMOL/L — SIGNIFICANT CHANGE UP (ref 96–108)
CO2 SERPL-SCNC: 30 MMOL/L — SIGNIFICANT CHANGE UP (ref 22–31)
CREAT SERPL-MCNC: 1.02 MG/DL — SIGNIFICANT CHANGE UP (ref 0.5–1.3)
EGFR: 97 ML/MIN/1.73M2 — SIGNIFICANT CHANGE UP
EGFR: 97 ML/MIN/1.73M2 — SIGNIFICANT CHANGE UP
EOSINOPHIL # BLD AUTO: 0.23 K/UL — SIGNIFICANT CHANGE UP (ref 0–0.5)
EOSINOPHIL NFR BLD AUTO: 3.4 % — SIGNIFICANT CHANGE UP (ref 0–6)
GLUCOSE SERPL-MCNC: 89 MG/DL — SIGNIFICANT CHANGE UP (ref 70–99)
HCT VFR BLD CALC: 30.4 % — LOW (ref 39–50)
HGB BLD-MCNC: 9 G/DL — LOW (ref 13–17)
IMM GRANULOCYTES NFR BLD AUTO: 0.3 % — SIGNIFICANT CHANGE UP (ref 0–0.9)
LYMPHOCYTES # BLD AUTO: 2.01 K/UL — SIGNIFICANT CHANGE UP (ref 1–3.3)
LYMPHOCYTES # BLD AUTO: 29.9 % — SIGNIFICANT CHANGE UP (ref 13–44)
MCHC RBC-ENTMCNC: 25.2 PG — LOW (ref 27–34)
MCHC RBC-ENTMCNC: 29.6 G/DL — LOW (ref 32–36)
MCV RBC AUTO: 85.2 FL — SIGNIFICANT CHANGE UP (ref 80–100)
MONOCYTES # BLD AUTO: 1.12 K/UL — HIGH (ref 0–0.9)
MONOCYTES NFR BLD AUTO: 16.6 % — HIGH (ref 2–14)
NEUTROPHILS # BLD AUTO: 3.33 K/UL — SIGNIFICANT CHANGE UP (ref 1.8–7.4)
NEUTROPHILS NFR BLD AUTO: 49.5 % — SIGNIFICANT CHANGE UP (ref 43–77)
NRBC BLD AUTO-RTO: 0 /100 WBCS — SIGNIFICANT CHANGE UP (ref 0–0)
PLATELET # BLD AUTO: 335 K/UL — SIGNIFICANT CHANGE UP (ref 150–400)
POTASSIUM SERPL-MCNC: 3.7 MMOL/L — SIGNIFICANT CHANGE UP (ref 3.5–5.3)
POTASSIUM SERPL-SCNC: 3.7 MMOL/L — SIGNIFICANT CHANGE UP (ref 3.5–5.3)
PROT SERPL-MCNC: 7 G/DL — SIGNIFICANT CHANGE UP (ref 6–8.3)
RBC # BLD: 3.57 M/UL — LOW (ref 4.2–5.8)
RBC # FLD: 18.6 % — HIGH (ref 10.3–14.5)
SODIUM SERPL-SCNC: 143 MMOL/L — SIGNIFICANT CHANGE UP (ref 135–145)
WBC # BLD: 6.73 K/UL — SIGNIFICANT CHANGE UP (ref 3.8–10.5)
WBC # FLD AUTO: 6.73 K/UL — SIGNIFICANT CHANGE UP (ref 3.8–10.5)

## 2025-05-19 PROCEDURE — 99232 SBSQ HOSP IP/OBS MODERATE 35: CPT

## 2025-05-19 RX ADMIN — APIXABAN 5 MILLIGRAM(S): 2.5 TABLET, FILM COATED ORAL at 06:33

## 2025-05-19 RX ADMIN — GABAPENTIN 1200 MILLIGRAM(S): 400 CAPSULE ORAL at 06:34

## 2025-05-19 RX ADMIN — SODIUM HYPOCHLORITE 1 APPLICATION(S): 0.12 SOLUTION TOPICAL at 06:35

## 2025-05-19 RX ADMIN — Medication 400 MILLIGRAM(S): at 17:53

## 2025-05-19 RX ADMIN — SEVELAMER HYDROCHLORIDE 800 MILLIGRAM(S): 800 TABLET ORAL at 11:35

## 2025-05-19 RX ADMIN — GABAPENTIN 1200 MILLIGRAM(S): 400 CAPSULE ORAL at 22:24

## 2025-05-19 RX ADMIN — GABAPENTIN 1200 MILLIGRAM(S): 400 CAPSULE ORAL at 14:09

## 2025-05-19 RX ADMIN — QUETIAPINE FUMARATE 25 MILLIGRAM(S): 25 TABLET ORAL at 22:24

## 2025-05-19 RX ADMIN — OXYCODONE HYDROCHLORIDE 20 MILLIGRAM(S): 30 TABLET ORAL at 08:46

## 2025-05-19 RX ADMIN — Medication 500 MILLIGRAM(S): at 11:34

## 2025-05-19 RX ADMIN — Medication 1 APPLICATION(S): at 22:31

## 2025-05-19 RX ADMIN — Medication 400 MILLIGRAM(S): at 11:34

## 2025-05-19 RX ADMIN — Medication 1 APPLICATION(S): at 20:15

## 2025-05-19 RX ADMIN — Medication 220 MILLIGRAM(S): at 11:35

## 2025-05-19 RX ADMIN — TAMSULOSIN HYDROCHLORIDE 0.8 MILLIGRAM(S): 0.4 CAPSULE ORAL at 22:23

## 2025-05-19 RX ADMIN — Medication 9 MILLIGRAM(S): at 22:24

## 2025-05-19 RX ADMIN — APIXABAN 5 MILLIGRAM(S): 2.5 TABLET, FILM COATED ORAL at 17:53

## 2025-05-19 RX ADMIN — SEVELAMER HYDROCHLORIDE 800 MILLIGRAM(S): 800 TABLET ORAL at 17:53

## 2025-05-19 RX ADMIN — OXYCODONE HYDROCHLORIDE 15 MILLIGRAM(S): 30 TABLET ORAL at 17:53

## 2025-05-19 RX ADMIN — MUPIROCIN CALCIUM 1 APPLICATION(S): 20 CREAM TOPICAL at 06:35

## 2025-05-19 RX ADMIN — Medication 1 APPLICATION(S): at 06:35

## 2025-05-19 RX ADMIN — OXYCODONE HYDROCHLORIDE 15 MILLIGRAM(S): 30 TABLET ORAL at 18:53

## 2025-05-19 RX ADMIN — Medication 1 TABLET(S): at 11:37

## 2025-05-19 RX ADMIN — Medication 40 MILLIGRAM(S): at 06:34

## 2025-05-19 RX ADMIN — SODIUM HYPOCHLORITE 1 APPLICATION(S): 0.12 SOLUTION TOPICAL at 20:15

## 2025-05-19 RX ADMIN — OXYCODONE HYDROCHLORIDE 20 MILLIGRAM(S): 30 TABLET ORAL at 09:46

## 2025-05-19 RX ADMIN — Medication 400 MILLIGRAM(S): at 08:39

## 2025-05-19 RX ADMIN — OXYCODONE HYDROCHLORIDE 20 MILLIGRAM(S): 30 TABLET ORAL at 22:23

## 2025-05-19 RX ADMIN — AMLODIPINE BESYLATE 10 MILLIGRAM(S): 10 TABLET ORAL at 06:33

## 2025-05-19 RX ADMIN — OXYCODONE HYDROCHLORIDE 20 MILLIGRAM(S): 30 TABLET ORAL at 23:03

## 2025-05-19 RX ADMIN — DULOXETINE 60 MILLIGRAM(S): 20 CAPSULE, DELAYED RELEASE ORAL at 11:34

## 2025-05-19 RX ADMIN — MUPIROCIN CALCIUM 1 APPLICATION(S): 20 CREAM TOPICAL at 20:15

## 2025-05-19 RX ADMIN — SEVELAMER HYDROCHLORIDE 800 MILLIGRAM(S): 800 TABLET ORAL at 08:39

## 2025-05-19 RX ADMIN — NALOXEGOL OXALATE 25 MILLIGRAM(S): 12.5 TABLET, FILM COATED ORAL at 11:35

## 2025-05-19 RX ADMIN — CLOTRIMAZOLE 1 APPLICATION(S): 1 CREAM TOPICAL at 11:38

## 2025-05-19 NOTE — PROGRESS NOTE ADULT - ASSESSMENT
39 year old male with PMH of morbid obesity, BEA on CPAP, pAFIB (not on AC), HTN, and BL papilledema attributed to pseudotumor cerebri; who initially presented to  on 2/24 with SOB and BL LE edema. Found to have URI with progressive SOB and multifocal PNA on imaging. His hospital course was complicated by worsening respiratory distress and increased 02 demands secondary to secretions (requiring multiple intubation attempts) and eventual MICU admission. While in MICU, he 02 requirements continued despite optimal vent management. Concern noted for progressive ARDS, unable to prone given morbid obesity, and ultimately cannulated for vvECMO on 2/25 and transferred to [place] for further management on 2/26.  His hospital course at [facility] was significant for the following: diuretic and ABX management, s/p trache and PEG (placed on 3/7- PEG since removed), RCU admission, high grade fevers (secondary to panniculitis), progressively worsening sacral ulcer (likely osteomyelitis- s/p surgical debridement), BL foot wound (secondary to pressor use), neuropathy (secondary to critical illness polyneuropathy), ELLA (secondary to vancomycin toxicity and overdiuresis- since DCd- with improvement). Patient now admitted for a multidisciplinary rehab program. 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  (present on admission)   - Gait Instability, ADL impairments and Functional impairments: start Comprehensive Rehab Program of PT/OT  - 3 hours a day, 5 days a week   - PRN: Nebulizer QID     #BEA on BIPAP  --via nasal canula qhs    #Sacral Osteomyelitis  - Zosyn TID - completed on 5/12/25    #Paroxysmal AFIB  - Eliquis 5mg BID     #Calf Tenderness  - BL LE doppler negative     #L hip pain - resolved  - limiting therapy progress   - Celebrex 200mg BID - completed  on 5/14  - no acute findings on XR or CT   - L hip MRI (5/7) no significant findings     #HTN  - Amlodipine 10mg dialy     #Sleep/Mood  - Melatonin 9mg at HS   - Quetiapine 25mg at HS     #Skin  Wound Recs per Plastic Surgery (5/14):  Sacral Pressure Injury: BID- Cleanse with NS, remove excess fluid, apply santyl ointment to sloughing tissues, pack wound with 1-inch dry plain packing, cover with 4 inch gauze and abd combine  - R buttock: BID- Cleanse with NS, overlay calcium alginate, ABD combine dressing  Additional wound care instructions:  -->Right anterior thigh to groin isolated wound: Clean wound and periwound skin with NS. Pat dry. Apply liquid barrier film to periwound skin, allow to dry. Cover with silicone foam with border. Change daily or PRN if soiled   --> Bilateral abdominal pannus, bilateral groin: Cleanse with luke-warm soap and water, dry well. Apply clotrimazole  --> Bilateral feet: Apply betadine  to bilateral foot wounds followed by 4x4 gauze, ABD pad, and matilde daily per podiatry.  --> Continue to offload pressure; bariatric low airloss support surface, turn and position per protocol with use of fluidized positioning devices, continue incontinence and moisture care per protocol and use of single breathable incontinence pads, continue to offload heels with fluidized positioning devices. Continue use of bariatric seat cushion, limit sit time- no more than 2 consecutive hours at any given time.  - Pressure injury/Skin: OOB to Chair, PT/OT    - Ammonium lactate to BL LEs     #Neuropathy  - Cymbalta 60mg daily   - Gabapentin 1200mg TID     #Pain Mgmt   - Capsaicin cream to BL feet QID - Avoid use on damaged, broken, or irritated skin. Avoid occlusive dressing or heat   - Lidocaine patch to BL thighs   - PRN:; Oxycodone and Tylenol 650mg QID     #Morbid obesity  - Most recent weight 394lbs (5/7)     #GI/Bowel Mgmt   - Pantoprazole  - Bisacodyl 10mg at HS   - Miralax   - Naloxegal 25mg daily     #/Bladder Mgmt   #ELLA  #Urinary Retention--voiding per urethra  - Renvela 800mg TID  - Cr improving   - Bladder scan TID  - Flomax 0.8mg at HS     #FEN --Morbid obesity (BMI > 40).  - Diet - Regular + Thins  [CCHO]    - MVI     # Health Management:   Environmental challenge affecting dc plan--narrow door and need for ramp to house entrance  However, patient making goal directed effort towards therapy goals     #Precautions / PROPHYLAXIS:   - Falls  - ortho: Weight bearing status: WBAT in surgical shoe   - Lungs: Aspiration, Incentive Spirometer   - DVT: Lovenox  ----------------------------------------------  IDT 5/13  Sit to stand mod assist x 1 T/f bed to  Bussy board mod assist x 1  Took 4 steps on parallel bars with mo assist x 2  Barrier--leakage from sides of vac dressing  LE weakness  Ext 5/16 revised to 5/30 (interval left hip pain, leakage from sides of VAC, environmental issue at home--needs time to optimize function and ensure safe community dc, making goal directed progress)  ----------------------------------------------  Dr. DYSONs Liaison with Family/Providers:      OUTPATIENT/FOLLOW UP:    Your Primary Care Provider,   Phone: (   )    -  Fax: (   )    -  Follow Up Time: 1 week    [facility] [place],   [address]  Phone: [phone]  Fax: (   )    -  Follow Up Time:    Dr. Waterhouse  Podiatry  [phone]  Within 1 week   -----------------------------------------------    39 year old male with PMH of morbid obesity, BEA on CPAP, pAFIB (not on AC), HTN, and BL papilledema attributed to pseudotumor cerebri; who initially presented to  on 2/24 with SOB and BL LE edema. Found to have URI with progressive SOB and multifocal PNA on imaging. His hospital course was complicated by worsening respiratory distress and increased 02 demands secondary to secretions (requiring multiple intubation attempts) and eventual MICU admission. While in MICU, he 02 requirements continued despite optimal vent management. Concern noted for progressive ARDS, unable to prone given morbid obesity, and ultimately cannulated for vvECMO on 2/25 and transferred to [facility] for further management on 2/26.  His hospital course at [facility] was significant for the following: diuretic and ABX management, s/p trache and PEG (placed on 3/7- PEG since removed), RCU admission, high grade fevers (secondary to panniculitis), progressively worsening sacral ulcer (likely osteomyelitis- s/p surgical debridement), BL foot wound (secondary to pressor use), neuropathy (secondary to critical illness polyneuropathy), ELLA (secondary to vancomycin toxicity and overdiuresis- since DCd- with improvement). Patient now admitted for a multidisciplinary rehab program. 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  (present on admission)   - Gait Instability, ADL impairments and Functional impairments: start Comprehensive Rehab Program of PT/OT  - 3 hours a day, 5 days a week   - PRN: Nebulizer QID     #BEA on BIPAP  --via nasal canula qhs    #Sacral Osteomyelitis  - Zosyn TID - completed on 5/12/25    #Paroxysmal AFIB  - Eliquis 5mg BID     #Calf Tenderness  - BL LE doppler negative     #L hip pain - resolved resolved   - no acute findings on XR or CT   - L hip MRI (5/7) no significant findings     #HTN  - Amlodipine 10mg dialy     #Sleep/Mood  - Melatonin 9mg at HS   - Quetiapine 25mg at HS     #Skin  Wound Recs per Plastic Surgery (5/16):  Sacral Pressure Injury: BID- Cleanse with NS, remove excess fluid, apply santyl ointment to sloughing tissues, pack wound with 1-inch dry plain packing, cover with 4 inch gauze and abd combine  - R buttock: BID- Cleanse with NS, overlay calcium alginate, ABD combine dressing  Additional wound care instructions:  -->Right anterior thigh to groin isolated wound: Clean wound and periwound skin with NS. Pat dry. Apply liquid barrier film to periwound skin, allow to dry. Cover with silicone foam with border. Change daily or PRN if soiled   --> Bilateral abdominal pannus, bilateral groin: Cleanse with luke-warm soap and water, dry well. Apply clotrimazole  --> Bilateral feet: Apply betadine  to bilateral foot wounds followed by 4x4 gauze, ABD pad, and matilde daily per podiatry.  --> Continue to offload pressure; bariatric low airloss support surface, turn and position per protocol with use of fluidized positioning devices, continue incontinence and moisture care per protocol and use of single breathable incontinence pads, continue to offload heels with fluidized positioning devices. Continue use of bariatric seat cushion, limit sit time- no more than 2 consecutive hours at any given time.  - Pressure injury/Skin: OOB to Chair, PT/OT    - Ammonium lactate to BL LEs     #Neuropathy  - Cymbalta 60mg daily   - Gabapentin 1200mg TID     #Pain Mgmt   - Capsaicin cream to BL feet QID - Avoid use on damaged, broken, or irritated skin. Avoid occlusive dressing or heat   - Lidocaine patch to BL thighs   - PRN:; Oxycodone and Tylenol 650mg QID     #Morbid obesity  - Most recent weight 394lbs (5/7)     #GI/Bowel Mgmt   - Pantoprazole  - Bisacodyl 10mg at HS   - Miralax   - Naloxegal 25mg daily     #/Bladder Mgmt   #ELLA  #Urinary Retention--voiding per urethra  - Renvela 800mg TID  - Cr improving   - Bladder scan TID  - Flomax 0.8mg at HS     #FEN --Morbid obesity (BMI > 40).  - Diet - Regular + Thins  [CCHO]    - MVI     # Health Management:   Environmental challenge affecting dc plan--narrow door and need for ramp to house entrance  However, patient making goal directed effort towards therapy goals     #Precautions / PROPHYLAXIS:   - Falls  - ortho: Weight bearing status: WBAT in surgical shoe   - Lungs: Aspiration, Incentive Spirometer   - DVT: Lovenox  ----------------------------------------------  IDT 5/13  Sit to stand mod assist x 1 T/f bed to  Bussy board mod assist x 1  Took 4 steps on parallel bars with mo assist x 2  Barrier--leakage from sides of vac dressing  LE weakness  Ext 5/16 revised to 5/30 (interval left hip pain, leakage from sides of VAC, environmental issue at home--needs time to optimize function and ensure safe community dc, making goal directed progress)  ----------------------------------------------  Dr. Holder Liaison with Family/Providers:      OUTPATIENT/FOLLOW UP:    Your Primary Care Provider,   Phone: (   )    -  Fax: (   )    -  Follow Up Time: 1 week    [facility],   [address]  Phone: [phone]  Fax: (   )    -  Follow Up Time:    Dr. Waterhouse  Podiatry  [phone]  Within 1 week

## 2025-05-19 NOTE — PROGRESS NOTE ADULT - SUBJECTIVE AND OBJECTIVE BOX
Subjective  Patient seen and examined this AM. and observed in therapy.  Father and girlfriend present during encounter.   Admits to sleeping well.  Experiencing sole of foot pain.  Discussed offloading shoes.     ROS--no acute pain, no fever or chest pain   B/l leg and sacral ulcers  LBM 5/18    Function --increased ambulatory distance  -Ambulated 15' with bariatric RW needing MOD A x 2 on each side to support  trunk/pelvis extension to maintain upright posture and close wheelchair follow  for safety. Modified 3 point step to gait leading with Right LE. Decreased  clearance/step length noted landing in foot flat positioning.  Distances limited  by pain and fatigue requiring extended seated rest breaks.  -Tolerated 2 walks in total with recovery time      Vital Signs Last 24 Hrs  T(C): 36.6 (19 May 2025 08:41), Max: 37.2 (18 May 2025 22:20)  T(F): 97.8 (19 May 2025 08:41), Max: 99 (18 May 2025 22:20)  HR: 108 (19 May 2025 08:41) (99 - 112)  BP: 116/66 (19 May 2025 08:41) (112/82 - 137/81)  BP(mean): --  RR: 16 (19 May 2025 08:41) (16 - 16)  SpO2: 95% (19 May 2025 08:41) (95% - 98%)    EXAM  Gen - Comfortable,   HEENT - NAD  Neck - Supple, No limited ROM  Pulm - Clear  Cardiovascular - RRR, S1S2, No murmurs  Abdomen - Soft, non tender, +BS  Extremities - chronic skin changes with hyperpigmentation at shin      Neuro-  AAO X 3, Speech is normal     Motor - UE 5/5                    LEFT    LE - HF 3/5, KE 3/5 ankle 2/5                    RIGHT LE - HF 3/5, KE 3/5 ankle 2/5      Sensory - Decreased to light touch bilateral lower extremities      Coordination - impaired lower extremities   Psychiatric - Mood stable, Affect WNL    Skin:  R foot plantar aspect lateral/distal side 7cm x 5.5cm ischemic wound from pressors  RLE 4th digit 1x1.5cm ischemic induced wound from pressors  RLE 5th digit 3x2cm ischemic induced wound from pressors  L foot plantar aspect lateral/distal side 6x7cm ischemic induced wound from pressor usage  LLE 4th digit 2x1cm ischemic induced wound from pressors  LLE 5th digit 2.5x3cm ischemic induced wound from pressors   R groin skin tear 1x1.5cm  Unstageable pressure wound cocyx 7cmx4.5cm w/ 2.5cm depth  R buttock pressure induced wound semi contiguous with coccyx wound calling unstageable given prior debridement 8.5x5.5cm  L buttock 1.0x0.5cm stage 3 pressure injury  R buttock skin tear 3.5cm x 0.5cm and 1.5cmx0.5cm    MMS--antigravity upper extremities    LABS:                        9.0    6.73  )-----------( 335      ( 19 May 2025 07:20 )             30.4     05-19    143  |  104  |  9   ----------------------------<  89  3.7   |  30  |  1.02    Ca    9.3      19 May 2025 07:20    TPro  7.0  /  Alb  2.2[L]  /  TBili  0.4  /  DBili  x   /  AST  12  /  ALT  12  /  AlkPhos  52  05-19      Urinalysis Basic - ( 19 May 2025 07:20 )    Color: x / Appearance: x / SG: x / pH: x  Gluc: 89 mg/dL / Ketone: x  / Bili: x / Urobili: x   Blood: x / Protein: x / Nitrite: x   Leuk Esterase: x / RBC: x / WBC x   Sq Epi: x / Non Sq Epi: x / Bacteria: x        MEDICATIONS  (STANDING):  amLODIPine   Tablet 10 milliGRAM(s) Oral daily  ammonium lactate 12% Lotion 1 Application(s) Topical two times a day  apixaban 5 milliGRAM(s) Oral two times a day  ascorbic acid 500 milliGRAM(s) Oral daily  bisacodyl 10 milliGRAM(s) Oral at bedtime  capsaicin 0.025% Cream 1 Application(s) Topical four times a day  clotrimazole 1% Cream 1 Application(s) Topical <User Schedule>  collagenase Ointment 1 Application(s) Topical two times a day  Dakins Solution - 1/4 Strength 1 Application(s) Topical two times a day  dextrose 5%. 1000 milliLiter(s) (100 mL/Hr) IV Continuous <Continuous>  dextrose 5%. 1000 milliLiter(s) (50 mL/Hr) IV Continuous <Continuous>  dextrose 50% Injectable 25 Gram(s) IV Push once  dextrose 50% Injectable 12.5 Gram(s) IV Push once  dextrose 50% Injectable 25 Gram(s) IV Push once  DULoxetine 60 milliGRAM(s) Oral daily  gabapentin 1200 milliGRAM(s) Oral every 8 hours  glucagon  Injectable 1 milliGRAM(s) IntraMuscular once  influenza   Vaccine 0.5 milliLiter(s) IntraMuscular once  lidocaine   4% Patch 1 Patch Transdermal daily  lidocaine   4% Patch 1 Patch Transdermal daily  magnesium oxide 400 milliGRAM(s) Oral three times a day with meals  melatonin 9 milliGRAM(s) Oral at bedtime  multivitamin 1 Tablet(s) Oral daily  mupirocin 2% Ointment 1 Application(s) Topical two times a day  naloxegol 25 milliGRAM(s) Oral daily  pantoprazole    Tablet 40 milliGRAM(s) Oral before breakfast  petrolatum white Ointment 1 Application(s) Topical every 8 hours  polyethylene glycol 3350 17 Gram(s) Oral two times a day  povidone iodine 10% Solution 1 Application(s) Topical daily  QUEtiapine 25 milliGRAM(s) Oral at bedtime  sevelamer carbonate 800 milliGRAM(s) Oral three times a day with meals  silver nitrate Applicator 6 Application(s) Topical once  tamsulosin 0.8 milliGRAM(s) Oral at bedtime  zinc sulfate 220 milliGRAM(s) Oral daily    MEDICATIONS  (PRN):  albuterol/ipratropium for Nebulization 3 milliLiter(s) Nebulizer every 6 hours PRN Shortness of Breath and/or Wheezing  dextrose Oral Gel 15 Gram(s) Oral once PRN Blood Glucose LESS THAN 70 milliGRAM(s)/deciliter  oxyCODONE    IR 20 milliGRAM(s) Oral every 6 hours PRN Severe Pain (7 - 10)  oxyCODONE    IR 15 milliGRAM(s) Oral two times a day PRN Moderate Pain (4 - 6)  oxyCODONE    IR 10 milliGRAM(s) Oral two times a day PRN Mild Pain (1 - 3)     Subjective  Patient seen and examined this AM. and observed in therapy.  Father and girlfriend present during encounter.   Admits to sleeping well.  Experiencing sole of foot pain over the areas with skin break--distal sole of foot  Discussed offloading shoes, liaising with therapists to facilitate provision    ROS--no acute pain, no fever or chest pain   B/l leg and sacral ulcers  LBM 5/18    Function  -Ambulated 15' with bariatric RW needing MOD A x 2 on each side to support  trunk/pelvis extension to maintain upright posture and close wheelchair follow  for safety. Modified 3 point step to gait leading with Right LE. Decreased  clearance/step length noted landing in foot flat positioning.  Distances limited  by pain and fatigue requiring extended seated rest breaks.  -Tolerated 2 walks in total with recovery time    Vital Signs Last 24 Hrs  T(C): 36.6 (19 May 2025 08:41), Max: 37.2 (18 May 2025 22:20)  T(F): 97.8 (19 May 2025 08:41), Max: 99 (18 May 2025 22:20)  HR: 108 (19 May 2025 08:41) (99 - 112)  BP: 116/66 (19 May 2025 08:41) (112/82 - 137/81)  RR: 16 (19 May 2025 08:41) (16 - 16)  SpO2: 95% (19 May 2025 08:41) (95% - 98%)    EXAM  Gen - Comfortable,   HEENT - NAD  Neck - Supple, No limited ROM  Pulm - Clear  Cardiovascular - RRR, S1S2, No murmurs  Abdomen - Soft, non tender, +BS  Extremities - chronic skin changes with hyperpigmentation at shin      Neuro-  AAO X 3, Speech is normal     Motor - UE 5/5                    LEFT    LE - HF 3/5, KE 3/5 ankle 2/5                    RIGHT LE - HF 3/5, KE 3/5 ankle 2/5      Sensory - Decreased to light touch bilateral lower extremities      Coordination - impaired lower extremities   Psychiatric - Mood stable, Affect WNL    Skin:  R foot plantar aspect lateral/distal side 7cm x 5.5cm ischemic wound from pressors  RLE 4th digit 1x1.5cm ischemic induced wound from pressors  RLE 5th digit 3x2cm ischemic induced wound from pressors  L foot plantar aspect lateral/distal side 6x7cm ischemic induced wound from pressor usage  LLE 4th digit 2x1cm ischemic induced wound from pressors  LLE 5th digit 2.5x3cm ischemic induced wound from pressors   R groin skin tear 1x1.5cm  Unstageable pressure wound cocyx 7cmx4.5cm w/ 2.5cm depth  R buttock pressure induced wound semi contiguous with coccyx wound calling unstageable given prior debridement 8.5x5.5cm  L buttock 1.0x0.5cm stage 3 pressure injury  R buttock skin tear 3.5cm x 0.5cm and 1.5cmx0.5cm    MMS--antigravity upper extremities    LABS:                        9.0    6.73  )-----------( 335      ( 19 May 2025 07:20 )             30.4     05-19    143  |  104  |  9   ----------------------------<  89  3.7   |  30  |  1.02    Ca    9.3      19 May 2025 07:20    TPro  7.0  /  Alb  2.2[L]  /  TBili  0.4  /  DBili  x   /  AST  12  /  ALT  12  /  AlkPhos  52  05-19      Urinalysis Basic - ( 19 May 2025 07:20 )    Color: x / Appearance: x / SG: x / pH: x  Gluc: 89 mg/dL / Ketone: x  / Bili: x / Urobili: x   Blood: x / Protein: x / Nitrite: x   Leuk Esterase: x / RBC: x / WBC x   Sq Epi: x / Non Sq Epi: x / Bacteria: x        MEDICATIONS  (STANDING):  amLODIPine   Tablet 10 milliGRAM(s) Oral daily  ammonium lactate 12% Lotion 1 Application(s) Topical two times a day  apixaban 5 milliGRAM(s) Oral two times a day  ascorbic acid 500 milliGRAM(s) Oral daily  bisacodyl 10 milliGRAM(s) Oral at bedtime  capsaicin 0.025% Cream 1 Application(s) Topical four times a day  clotrimazole 1% Cream 1 Application(s) Topical <User Schedule>  collagenase Ointment 1 Application(s) Topical two times a day  Dakins Solution - 1/4 Strength 1 Application(s) Topical two times a day  dextrose 5%. 1000 milliLiter(s) (100 mL/Hr) IV Continuous <Continuous>  dextrose 5%. 1000 milliLiter(s) (50 mL/Hr) IV Continuous <Continuous>  dextrose 50% Injectable 25 Gram(s) IV Push once  dextrose 50% Injectable 12.5 Gram(s) IV Push once  dextrose 50% Injectable 25 Gram(s) IV Push once  DULoxetine 60 milliGRAM(s) Oral daily  gabapentin 1200 milliGRAM(s) Oral every 8 hours  glucagon  Injectable 1 milliGRAM(s) IntraMuscular once  influenza   Vaccine 0.5 milliLiter(s) IntraMuscular once  lidocaine   4% Patch 1 Patch Transdermal daily  lidocaine   4% Patch 1 Patch Transdermal daily  magnesium oxide 400 milliGRAM(s) Oral three times a day with meals  melatonin 9 milliGRAM(s) Oral at bedtime  multivitamin 1 Tablet(s) Oral daily  mupirocin 2% Ointment 1 Application(s) Topical two times a day  naloxegol 25 milliGRAM(s) Oral daily  pantoprazole    Tablet 40 milliGRAM(s) Oral before breakfast  petrolatum white Ointment 1 Application(s) Topical every 8 hours  polyethylene glycol 3350 17 Gram(s) Oral two times a day  povidone iodine 10% Solution 1 Application(s) Topical daily  QUEtiapine 25 milliGRAM(s) Oral at bedtime  sevelamer carbonate 800 milliGRAM(s) Oral three times a day with meals  silver nitrate Applicator 6 Application(s) Topical once  tamsulosin 0.8 milliGRAM(s) Oral at bedtime  zinc sulfate 220 milliGRAM(s) Oral daily    MEDICATIONS  (PRN):  albuterol/ipratropium for Nebulization 3 milliLiter(s) Nebulizer every 6 hours PRN Shortness of Breath and/or Wheezing  dextrose Oral Gel 15 Gram(s) Oral once PRN Blood Glucose LESS THAN 70 milliGRAM(s)/deciliter  oxyCODONE    IR 20 milliGRAM(s) Oral every 6 hours PRN Severe Pain (7 - 10)  oxyCODONE    IR 15 milliGRAM(s) Oral two times a day PRN Moderate Pain (4 - 6)  oxyCODONE    IR 10 milliGRAM(s) Oral two times a day PRN Mild Pain (1 - 3)

## 2025-05-20 PROCEDURE — 99232 SBSQ HOSP IP/OBS MODERATE 35: CPT

## 2025-05-20 PROCEDURE — 99233 SBSQ HOSP IP/OBS HIGH 50: CPT

## 2025-05-20 PROCEDURE — 76770 US EXAM ABDO BACK WALL COMP: CPT | Mod: 26

## 2025-05-20 RX ADMIN — MUPIROCIN CALCIUM 1 APPLICATION(S): 20 CREAM TOPICAL at 07:37

## 2025-05-20 RX ADMIN — Medication 40 MILLIGRAM(S): at 05:49

## 2025-05-20 RX ADMIN — APIXABAN 5 MILLIGRAM(S): 2.5 TABLET, FILM COATED ORAL at 05:49

## 2025-05-20 RX ADMIN — Medication 1 TABLET(S): at 12:30

## 2025-05-20 RX ADMIN — DULOXETINE 60 MILLIGRAM(S): 20 CAPSULE, DELAYED RELEASE ORAL at 12:31

## 2025-05-20 RX ADMIN — Medication 400 MILLIGRAM(S): at 08:59

## 2025-05-20 RX ADMIN — Medication 9 MILLIGRAM(S): at 22:40

## 2025-05-20 RX ADMIN — OXYCODONE HYDROCHLORIDE 20 MILLIGRAM(S): 30 TABLET ORAL at 08:58

## 2025-05-20 RX ADMIN — Medication 1 APPLICATION(S): at 07:37

## 2025-05-20 RX ADMIN — OXYCODONE HYDROCHLORIDE 20 MILLIGRAM(S): 30 TABLET ORAL at 22:40

## 2025-05-20 RX ADMIN — Medication 1 APPLICATION(S): at 12:32

## 2025-05-20 RX ADMIN — Medication 400 MILLIGRAM(S): at 12:30

## 2025-05-20 RX ADMIN — GABAPENTIN 1200 MILLIGRAM(S): 400 CAPSULE ORAL at 05:49

## 2025-05-20 RX ADMIN — GABAPENTIN 1200 MILLIGRAM(S): 400 CAPSULE ORAL at 22:40

## 2025-05-20 RX ADMIN — Medication 1 APPLICATION(S): at 22:42

## 2025-05-20 RX ADMIN — SEVELAMER HYDROCHLORIDE 800 MILLIGRAM(S): 800 TABLET ORAL at 12:31

## 2025-05-20 RX ADMIN — SEVELAMER HYDROCHLORIDE 800 MILLIGRAM(S): 800 TABLET ORAL at 17:36

## 2025-05-20 RX ADMIN — Medication 400 MILLIGRAM(S): at 17:36

## 2025-05-20 RX ADMIN — SODIUM HYPOCHLORITE 1 APPLICATION(S): 0.12 SOLUTION TOPICAL at 07:37

## 2025-05-20 RX ADMIN — Medication 500 MILLIGRAM(S): at 12:30

## 2025-05-20 RX ADMIN — Medication 1 APPLICATION(S): at 17:30

## 2025-05-20 RX ADMIN — APIXABAN 5 MILLIGRAM(S): 2.5 TABLET, FILM COATED ORAL at 17:36

## 2025-05-20 RX ADMIN — SEVELAMER HYDROCHLORIDE 800 MILLIGRAM(S): 800 TABLET ORAL at 08:59

## 2025-05-20 RX ADMIN — NALOXEGOL OXALATE 25 MILLIGRAM(S): 12.5 TABLET, FILM COATED ORAL at 12:31

## 2025-05-20 RX ADMIN — OXYCODONE HYDROCHLORIDE 20 MILLIGRAM(S): 30 TABLET ORAL at 23:40

## 2025-05-20 RX ADMIN — QUETIAPINE FUMARATE 25 MILLIGRAM(S): 25 TABLET ORAL at 22:39

## 2025-05-20 RX ADMIN — TAMSULOSIN HYDROCHLORIDE 0.8 MILLIGRAM(S): 0.4 CAPSULE ORAL at 22:39

## 2025-05-20 RX ADMIN — Medication 220 MILLIGRAM(S): at 12:30

## 2025-05-20 RX ADMIN — AMLODIPINE BESYLATE 10 MILLIGRAM(S): 10 TABLET ORAL at 05:49

## 2025-05-20 RX ADMIN — SODIUM HYPOCHLORITE 1 APPLICATION(S): 0.12 SOLUTION TOPICAL at 17:35

## 2025-05-20 RX ADMIN — OXYCODONE HYDROCHLORIDE 15 MILLIGRAM(S): 30 TABLET ORAL at 12:30

## 2025-05-20 RX ADMIN — MUPIROCIN CALCIUM 1 APPLICATION(S): 20 CREAM TOPICAL at 17:35

## 2025-05-20 RX ADMIN — OXYCODONE HYDROCHLORIDE 15 MILLIGRAM(S): 30 TABLET ORAL at 13:29

## 2025-05-20 RX ADMIN — GABAPENTIN 1200 MILLIGRAM(S): 400 CAPSULE ORAL at 15:16

## 2025-05-20 RX ADMIN — WHITE PETROLATUM 1 APPLICATION(S): 1 OINTMENT TOPICAL at 22:25

## 2025-05-20 RX ADMIN — Medication 1 APPLICATION(S): at 07:36

## 2025-05-20 RX ADMIN — OXYCODONE HYDROCHLORIDE 20 MILLIGRAM(S): 30 TABLET ORAL at 09:53

## 2025-05-20 RX ADMIN — Medication 1 APPLICATION(S): at 05:52

## 2025-05-20 RX ADMIN — Medication 1 APPLICATION(S): at 17:35

## 2025-05-20 NOTE — PROGRESS NOTE ADULT - SUBJECTIVE AND OBJECTIVE BOX
CC: Patient is a 39y old  Male who presents with a chief complaint of Debility secondary to acute hypoxic respiratory failure (20 May 2025 11:04)      Interval History:  Patient seen and examined at bedside.  No overnight events  reports pain at soles of feet. ongoing issue. pain medication helpful.    ALLERGIES:  No Known Allergies    MEDICATIONS  (STANDING):  amLODIPine   Tablet 10 milliGRAM(s) Oral daily  ammonium lactate 12% Lotion 1 Application(s) Topical two times a day  apixaban 5 milliGRAM(s) Oral two times a day  ascorbic acid 500 milliGRAM(s) Oral daily  bisacodyl 10 milliGRAM(s) Oral at bedtime  capsaicin 0.025% Cream 1 Application(s) Topical four times a day  clotrimazole 1% Cream 1 Application(s) Topical <User Schedule>  collagenase Ointment 1 Application(s) Topical two times a day  Dakins Solution - 1/4 Strength 1 Application(s) Topical two times a day  dextrose 5%. 1000 milliLiter(s) (100 mL/Hr) IV Continuous <Continuous>  dextrose 5%. 1000 milliLiter(s) (50 mL/Hr) IV Continuous <Continuous>  dextrose 50% Injectable 25 Gram(s) IV Push once  dextrose 50% Injectable 12.5 Gram(s) IV Push once  dextrose 50% Injectable 25 Gram(s) IV Push once  DULoxetine 60 milliGRAM(s) Oral daily  gabapentin 1200 milliGRAM(s) Oral every 8 hours  glucagon  Injectable 1 milliGRAM(s) IntraMuscular once  influenza   Vaccine 0.5 milliLiter(s) IntraMuscular once  lidocaine   4% Patch 1 Patch Transdermal daily  lidocaine   4% Patch 1 Patch Transdermal daily  magnesium oxide 400 milliGRAM(s) Oral three times a day with meals  melatonin 9 milliGRAM(s) Oral at bedtime  multivitamin 1 Tablet(s) Oral daily  mupirocin 2% Ointment 1 Application(s) Topical two times a day  naloxegol 25 milliGRAM(s) Oral daily  pantoprazole    Tablet 40 milliGRAM(s) Oral before breakfast  petrolatum white Ointment 1 Application(s) Topical every 8 hours  polyethylene glycol 3350 17 Gram(s) Oral two times a day  povidone iodine 10% Solution 1 Application(s) Topical daily  QUEtiapine 25 milliGRAM(s) Oral at bedtime  sevelamer carbonate 800 milliGRAM(s) Oral three times a day with meals  silver nitrate Applicator 6 Application(s) Topical once  tamsulosin 0.8 milliGRAM(s) Oral at bedtime  zinc sulfate 220 milliGRAM(s) Oral daily    MEDICATIONS  (PRN):  albuterol/ipratropium for Nebulization 3 milliLiter(s) Nebulizer every 6 hours PRN Shortness of Breath and/or Wheezing  dextrose Oral Gel 15 Gram(s) Oral once PRN Blood Glucose LESS THAN 70 milliGRAM(s)/deciliter  oxyCODONE    IR 20 milliGRAM(s) Oral every 6 hours PRN Severe Pain (7 - 10)  oxyCODONE    IR 15 milliGRAM(s) Oral two times a day PRN Moderate Pain (4 - 6)  oxyCODONE    IR 10 milliGRAM(s) Oral two times a day PRN Mild Pain (1 - 3)    Vital Signs Last 24 Hrs  T(F): 98.3 (20 May 2025 09:03), Max: 98.3 (20 May 2025 09:03)  HR: 103 (20 May 2025 09:03) (89 - 103)  BP: 119/71 (20 May 2025 09:03) (107/72 - 119/71)  RR: 16 (20 May 2025 09:03) (16 - 16)  SpO2: 93% (20 May 2025 09:03) (93% - 98%)  I&O's Summary    19 May 2025 07:01  -  20 May 2025 07:00  --------------------------------------------------------  IN: 0 mL / OUT: 500 mL / NET: -500 mL          PHYSICAL EXAM:  GENERAL: NAD. morbid obesity. sleeping but arouses easily.   CHEST/LUNG: Clear to percussion bilaterally; No rales, rhonchi, wheezing, or rubs; normal respiratory effort, no intercostal retractions. on room air.  HEART: Regular rate and rhythm; No murmurs, rubs, or gallops;  ABDOMEN: Soft, Nontender, Nondistended; Bowel sounds present; No HSM or masses  neuro: follows commands. b/l LE weakness.   Ext: + LE edema nonpitting. chronic skin changes. hyperpigmented.   PSYCH: Appropriate affect, Alert & Oriented    LABS:                        9.0    6.73  )-----------( 335      ( 19 May 2025 07:20 )             30.4       05-19    143  |  104  |  9   ----------------------------<  89  3.7   |  30  |  1.02    Ca    9.3      19 May 2025 07:20    TPro  7.0  /  Alb  2.2  /  TBili  0.4  /  DBili  x   /  AST  12  /  ALT  12  /  AlkPhos  52  05-19                03-08 Chol -- LDL -- HDL -- Trig 169 mg/dL                  Urinalysis Basic - ( 19 May 2025 07:20 )    Color: x / Appearance: x / SG: x / pH: x  Gluc: 89 mg/dL / Ketone: x  / Bili: x / Urobili: x   Blood: x / Protein: x / Nitrite: x   Leuk Esterase: x / RBC: x / WBC x   Sq Epi: x / Non Sq Epi: x / Bacteria: x        COVID-19 PCR: NotDetec (04-25-25 @ 18:31)      Care Discussed with Consultants/Other Providers: Yes

## 2025-05-20 NOTE — PROGRESS NOTE ADULT - ASSESSMENT
39 year old male with PMH of morbid obesity, BEA on CPAP, pAFIB (not on AC), HTN, and BL papilledema attributed to pseudotumor cerebri; who initially presented to  on 2/24 with SOB and BL LE edema. Found to have URI with progressive SOB and multifocal PNA on imaging. His hospital course was complicated by worsening respiratory distress and increased 02 demands secondary to secretions (requiring multiple intubation attempts) and eventual MICU admission. While in MICU, he 02 requirements continued despite optimal vent management. Concern noted for progressive ARDS, unable to prone given morbid obesity, and ultimately cannulated for vvECMO on 2/25 and transferred to [facility] for further management on 2/26.  His hospital course at [facility] was significant for the following: diuretic and ABX management, s/p trache and PEG (placed on 3/7- PEG since removed), RCU admission, high grade fevers (secondary to panniculitis), progressively worsening sacral ulcer (likely osteomyelitis- s/p surgical debridement), BL foot wound (secondary to pressor use), neuropathy (secondary to critical illness polyneuropathy), ELLA (secondary to vancomycin toxicity and overdiuresis- since DCd- with improvement). Patient now admitted for a multidisciplinary rehab program. 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  (present on admission)   - Gait Instability, ADL impairments and Functional impairments: start Comprehensive Rehab Program of PT/OT  - 3 hours a day, 5 days a week   - PRN: Nebulizer QID     #BEA on BIPAP  --via nasal canula qhs    #Sacral Osteomyelitis  - Zosyn TID - completed on 5/12/25    #Paroxysmal AFIB  - Eliquis 5mg BID     #Calf Tenderness  - BL LE doppler negative     #L hip pain - resolved resolved   - no acute findings on XR or CT   - L hip MRI (5/7) no significant findings     #HTN  - Amlodipine 10mg dialy     #Sleep/Mood  - Melatonin 9mg at HS   - Quetiapine 25mg at HS     #Skin  Wound Recs per Plastic Surgery (5/16):  Sacral Pressure Injury: BID- Cleanse with NS, remove excess fluid, apply santyl ointment to sloughing tissues, pack wound with 1-inch dry plain packing, cover with 4 inch gauze and abd combine  - R buttock: BID- Cleanse with NS, overlay calcium alginate, ABD combine dressing  Additional wound care instructions:  -->Right anterior thigh to groin isolated wound: Clean wound and periwound skin with NS. Pat dry. Apply liquid barrier film to periwound skin, allow to dry. Cover with silicone foam with border. Change daily or PRN if soiled   --> Bilateral abdominal pannus, bilateral groin: Cleanse with luke-warm soap and water, dry well. Apply clotrimazole  --> Bilateral feet: Apply betadine  to bilateral foot wounds followed by 4x4 gauze, ABD pad, and matilde daily per podiatry.  --> Continue to offload pressure; bariatric low airloss support surface, turn and position per protocol with use of fluidized positioning devices, continue incontinence and moisture care per protocol and use of single breathable incontinence pads, continue to offload heels with fluidized positioning devices. Continue use of bariatric seat cushion, limit sit time- no more than 2 consecutive hours at any given time.  - Pressure injury/Skin: OOB to Chair, PT/OT    - Ammonium lactate to BL LEs     #Neuropathy  - Cymbalta 60mg daily   - Gabapentin 1200mg TID     #Pain Mgmt   - Capsaicin cream to BL feet QID - Avoid use on damaged, broken, or irritated skin. Avoid occlusive dressing or heat   - Lidocaine patch to BL thighs   - PRN:; Oxycodone and Tylenol 650mg QID     #Morbid obesity  - Most recent weight 394lbs (5/7)     #GI/Bowel Mgmt   - Pantoprazole  - Bisacodyl 10mg at HS   - Miralax   - Naloxegal 25mg daily     #/Bladder Mgmt   #ELLA  #Urinary Retention--voiding per urethra  - Renvela 800mg TID  - Cr improving   - Bladder scan TID  - Flomax 0.8mg at HS     #FEN --Morbid obesity (BMI > 40).  - Diet - Regular + Thins  [CCHO]    - MVI     # Health Management:   Environmental challenge affecting dc plan--narrow door and need for ramp to house entrance  However, patient making goal directed effort towards therapy goals     #Precautions / PROPHYLAXIS:   - Falls  - ortho: Weight bearing status: WBAT in surgical shoe   - Lungs: Aspiration, Incentive Spirometer   - DVT: Lovenox  ----------------------------------------------  IDT 5/13  Sit to stand mod assist x 1 T/f bed to  Bussy board mod assist x 1  Took 4 steps on parallel bars with mo assist x 2  Barrier--leakage from sides of vac dressing  LE weakness  Ext 5/16 revised to 5/30 (interval left hip pain, leakage from sides of VAC, environmental issue at home--needs time to optimize function and ensure safe community dc, making goal directed progress)  ----------------------------------------------  Dr. Holder Liaison with Family/Providers:      OUTPATIENT/FOLLOW UP:    Your Primary Care Provider,   Phone: (   )    -  Fax: (   )    -  Follow Up Time: 1 week    [facility],   [address] [facility] [address] [facility]  Phone: [phone]  Fax: (   )    -  Follow Up Time:    Dr. Waterhouse  Podiatry  [phone]  Within 1 week       39 year old male with PMH of morbid obesity, BEA on CPAP, pAFIB (not on AC), HTN, and BL papilledema attributed to pseudotumor cerebri; who initially presented to  on 2/24 with SOB and BL LE edema. Found to have URI with progressive SOB and multifocal PNA on imaging. His hospital course was complicated by worsening respiratory distress and increased 02 demands secondary to secretions (requiring multiple intubation attempts) and eventual MICU admission. While in MICU, he 02 requirements continued despite optimal vent management. Concern noted for progressive ARDS, unable to prone given morbid obesity, and ultimately cannulated for vvECMO on 2/25 and transferred to [facility] for further management on 2/26.  His hospital course at [facility] was significant for the following: diuretic and ABX management, s/p trache and PEG (placed on 3/7- PEG since removed), RCU admission, high grade fevers (secondary to panniculitis), progressively worsening sacral ulcer (likely osteomyelitis- s/p surgical debridement), BL foot wound (secondary to pressor use), neuropathy (secondary to critical illness polyneuropathy), ELLA (secondary to vancomycin toxicity and overdiuresis- since DCd- with improvement). Patient now admitted for a multidisciplinary rehab program. 04-25-25 @ 15:19    * Therapy progress-Had successful body shower today with OT, Wt bearing on both legs limited by pain around areas with foot ulcer (sole of feet)  * Urinary hesitancy as noted by OT during session---F/u with Renal bladder ultrasound r/o bladder outlet obstruction or any obstructive lesions, hydronephrosis   * Pain both feet--revised analgesic regimen including pre therapy analgesics - offloading shoes from therapy   * Detailed function as recorded during IDT today     # Critical illness myopathy due to respiratory failure and sepsis  (present on admission)   - Gait Instability, ADL impairments and Functional impairments: start Comprehensive Rehab Program of PT/OT  - 3 hours a day, 5 days a week   - PRN: Nebulizer QID     #BEA on BIPAP  --via nasal canula qhs    #Sacral Osteomyelitis  - Zosyn TID - completed on 5/12/25    #Paroxysmal AFIB  - Eliquis 5mg BID     #Calf Tenderness  - BL LE doppler negative     #L hip pain - resolved resolved   - no acute findings on XR or CT   - L hip MRI (5/7) no significant findings     #HTN  - Amlodipine 10mg dialy     #Sleep/Mood  - Melatonin 9mg at HS   - Quetiapine 25mg at HS     #Skin  Wound Recs per Plastic Surgery (5/16):  Sacral Pressure Injury: BID- Cleanse with NS, remove excess fluid, apply santyl ointment to sloughing tissues, pack wound with 1-inch dry plain packing, cover with 4 inch gauze and abd combine  - R buttock: BID- Cleanse with NS, overlay calcium alginate, ABD combine dressing  Additional wound care instructions:  -->Right anterior thigh to groin isolated wound: Clean wound and periwound skin with NS. Pat dry. Apply liquid barrier film to periwound skin, allow to dry. Cover with silicone foam with border. Change daily or PRN if soiled   --> Bilateral abdominal pannus, bilateral groin: Cleanse with luke-warm soap and water, dry well. Apply clotrimazole  --> Bilateral feet: Apply betadine  to bilateral foot wounds followed by 4x4 gauze, ABD pad, and matilde daily per podiatry.  --> Continue to offload pressure; bariatric low airloss support surface, turn and position per protocol with use of fluidized positioning devices, continue incontinence and moisture care per protocol and use of single breathable incontinence pads, continue to offload heels with fluidized positioning devices. Continue use of bariatric seat cushion, limit sit time- no more than 2 consecutive hours at any given time.  - Pressure injury/Skin: OOB to Chair, PT/OT    - Ammonium lactate to BL LEs     #Neuropathy  - Cymbalta 60mg daily   - Gabapentin 1200mg TID     #Pain Mgmt   - Capsaicin cream to BL feet QID - Avoid use on damaged, broken, or irritated skin. Avoid occlusive dressing or heat   - Lidocaine patch to BL thighs   - PRN:; Oxycodone and Tylenol 650mg QID     #Morbid obesity  - Most recent weight 394lbs (5/7)     #GI/Bowel Mgmt   - Pantoprazole  - Bisacodyl 10mg at HS   - Miralax   - Naloxegal 25mg daily     #/Bladder Mgmt   #ELLA  #Urinary Retention--voiding per urethra  - Renvela 800mg TID  - Cr improving   - Bladder scan TID  - Flomax 0.8mg at HS     #FEN --Morbid obesity (BMI > 40).  - Diet - Regular + Thins  [CCHO]    - MVI     # Health Management:   Environmental challenge affecting dc plan--narrow door and need for ramp to house entrance  However, patient making goal directed effort towards therapy goals     #Precautions / PROPHYLAXIS:   - Falls  - ortho: Weight bearing status: WBAT in surgical shoe   - Lungs: Aspiration, Incentive Spirometer   - DVT: Lovenox  ----------------------------------------------  IDT 5/13  Stand pivot T/F during shower max x 2 persons  Urinary hesitancy noted  Pain b/l feet over areas with skin break on dorsum of foot, limiting wt bearing  Most trasfers still  Sit to stand mod assist x 1 T/f bed to  Bussy board mod assist x 1  Took 4 steps on parallel bars with mo assist x 2  Making progress towards therapy goals  Barrier--ulcers on feet, limiting wt bearing, urinary hesitancy, environmental barrier at home (stairs), severe obesity  LE weakness  Overall making functional progress, has supportive family,  hence decision to extend DC date and achieve same home dc   Ext 5/30 revised to 6/6  (barriers as noted, making functional progress)     ----------------------------------------------  Dr. Holder Liaison with Family/Providers:      OUTPATIENT/FOLLOW UP:    Your Primary Care Provider,   Phone: (   )    -  Fax: (   )    -  Follow Up Time: 1 week    [facility],   [address] [place] [address]  Phone: [phone]  Fax: (   )    -  Follow Up Time:    Dr. Waterhouse  Podiatry  [phone]  Within 1 week

## 2025-05-20 NOTE — PROGRESS NOTE ADULT - ASSESSMENT
39 year old male with PMH of morbid obesity, BEA, pAFIB (not on AC), HTN, and BL papilledema attributed to pseudotumor cerebri; who initially presented to  on 2/24 with SOB and BL LE edema. Found to have URI with progressive SOB and multifocal PNA on imaging. His hospital course was complicated by worsening respiratory distress and increased 02 demands secondary to secretions (requiring multiple intubation attempts) and eventual MICU admission. While in MICU, he 02 requirements continued despite optimal vent management. Concern noted for progressive ARDS, unable to prone given morbid obesity, and ultimately cannulated for vvECMO on 2/25 and transferred to [facility] for further management on 2/26. His hospital course at [facility] was significant for the following: diuretic and ABX management, s/p trach and PEG (placed on 3/7- PEG since removed), RCU admission, high grade fevers (secondary to panniculitis), progressively worsening sacral ulcer (likely osteomyelitis- s/p surgical debridement), BL foot wound (secondary to pressor use), neuropathy (secondary to critical illness polyneuropathy), ELLA (secondary to vancomycin toxicity and overdiuresis- since DCd- with improvement). Patient now admitted for a multidisciplinary rehab program. 04-25-25     # left hip pain - Improving  - on celebrex. patient to avoid if pain is tolerable as risk of bleeding is high with Eliquis  - hip xray--> no fracture  - CT hip: Again seen is a sacral decubitus wound which extends superficial as well as involving the right gluteus musculature and the posterior L5 with small amount of fluid and gas within the tract. Limited involvement on this noncontrast CT. This likely corresponds to adjacent phlegmonous changes  - MRI left hip: Incompletely characterized sacral wound with associated ill-defined collection of fluid and soft tissue gas which extends to/involve the right gluteus zurdo muscle as at CT pelvis study from one day prior. No interval change in the high STIR signal within the bilateral gluteal and thigh musculature suggestive of a myositis as compared to prior MRI study. No fracture, osseous destruction or aggressive periosteal reaction. No osteomyelitis at the left hip. Edema-like signal along the right and left greater trochanteric regions, overall increased as compared to the prior MRI study from 4/11/2025. Findings are felt to be reactive in etiology..  - pain control  - pt/ot    # Abnormal CT A/P:   - 5/2: CT A/P: Question of pneumatosis in the cecum with no other evidence of bowel ischemia. Correlation with lactate levels and clinical exam would be helpful.  - Patient has no symptoms. Abdomen is benign. Tolerating PO  - 5/2: Surgery cx noted: no intervention. c/w PO. refer to bariatric surgery post-DC dr botello at [place] or Dr. Segovia at [place] [per patient request]  - 5/3 GI cx noted: no GI intervention planned. c/w PO    #Stage IV Sacral Decub Ulcer  #Bilateral Buttocks Wounds  #Bilateral Foot Wounds  -5/2 CT A/P: An air and fluid containing collection in the right gluteus zurdo muscle in continuity with a subcutaneous sacral collection. No obvious skin defect to suggest a decubitus ulcer.  -5/7: MRI left hip: Incompletely characterized sacral wound with associated ill-defined collection of fluid and soft tissue gas which extends to/involve the right gluteus zurdo muscle as at CT pelvis study from one day prior. No interval change in the high STIR signal within the bilateral gluteal and thigh musculature suggestive of a myositis as compared to prior MRI study. No fracture, osseous destruction or aggressive periosteal reaction. No osteomyelitis at the left hip. Edema-like signal along the right and left greater trochanteric regions, overall increased as compared to the prior MRI study from 4/11/2025. Findings are felt to be reactive in etiology..  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 RV is not well visualized, enlarged RV cavity size a/ reduced RV systolic function. No significant valvular disease.  No echocardiographic evidence of pulmonary hypertension.  -s/p Trach (decannulated 3/28) and PEG (removed 4/15)   -Saturating well on RA  -Continue duoneb PRN   -BIPAP QHS (FiO2 40%, 18/10) via nasal mask   -Fall precaution  -Outpatient pulm follow up     #Chronic AFIB  #Sinus Tachycardia  - Patient has intermittent tachycardia. Per patient, His HR  mostly above 110 even when he is not sick. follows with cardio OP. not interested in rate control meds at this time.   - 5/3: EKG: NSR@94 bpm  - c/w Eliquis   - TSH WNL 2/25  - Sinus tachycardia likely multifactorial including pain and deconditioning. Low suspicion for PE, no SOB/hypoxia and he is already on full AC    #RIJ and Bilateral LE DVT  -Duplex RUE 3/14 showed Acute, non-occlusive deep vein thrombosis noted within the right internal jugular vein. Superficial vein thrombosis noted within the right antecubital cephalic vein.  -Duplex LE 3/14 showed Acute, occlusive deep vein thromboses noted within the right and left peroneal veins at both mid calves. Age-indeterminate, occlusive deep vein thrombosis visualized within the left gastrocnemius vein at the proximal calf.  -Continue eliquis  -Repeat duplex 4/29 showed No evidence of deep venous thrombosis in either lower extremity.    #Normocytic Anemia   -Likely multifactorial  -H/H stable, no evidence of active bleed   -Monitor CBC     #HTN  -Continue Amlodipine  -Monitor BP     #History of Pseudotumor Cerebri   -Outpatient follow up     #ELLA (Improved)  -Baseline Cr appears to be ~0.8 range   -ELLA felt to be multifactorial in setting of cardiorenal w/ RVE, recurrent ELLA suspected due to vancomycin toxicity and diuresis   -Continue to encourage oral hydration   -Continue Renvela   -Low phos diet  -Monitor BMP    #Type 2 Diabetes  -HbA1C 6.7 on 2/25, Repeat HbA1C 4.9 on 4/28  -FS trend reviewed, has been within goal  -Monitor glucose on routine lab, controlled     #Urinary Retention  -Flomax 5/1  - f.up renal US 5/20    #DVT PPx - Eliquis

## 2025-05-20 NOTE — PROGRESS NOTE ADULT - SUBJECTIVE AND OBJECTIVE BOX
Subjective  Patient seen and examined this AM.   Girlfriend present during encounter.   Admits to sleeping well.  Experiencing sole of foot pain over the areas with skin break--distal sole of foot.   Awaiting offloading shoes.     ROS--no acute pain, no fever or chest pain   B/l leg and sacral ulcers  LBM 5/19    Function  -Ambulated 15' with bariatric RW needing MOD A x 2 on each side to support  trunk/pelvis extension to maintain upright posture and close wheelchair follow  for safety. Modified 3 point step to gait leading with Right LE. Decreased  clearance/step length noted landing in foot flat positioning.  Distances limited  by pain and fatigue requiring extended seated rest breaks.  -Tolerated 2 walks in total with recovery time    Vital Signs Last 24 Hrs  T(C): 36.8 (20 May 2025 09:03), Max: 36.8 (20 May 2025 09:03)  T(F): 98.3 (20 May 2025 09:03), Max: 98.3 (20 May 2025 09:03)  HR: 103 (20 May 2025 09:03) (89 - 103)  BP: 119/71 (20 May 2025 09:03) (107/72 - 119/71)  BP(mean): --  RR: 16 (20 May 2025 09:03) (16 - 16)  SpO2: 93% (20 May 2025 09:03) (93% - 98%)    EXAM  Gen - Comfortable,   HEENT - NAD  Neck - Supple, No limited ROM  Pulm - Clear  Cardiovascular - RRR, S1S2, No murmurs  Abdomen - Soft, non tender, +BS  Extremities - chronic skin changes with hyperpigmentation at shin      Neuro-  AAO X 3, Speech is normal     Motor - UE 5/5                    LEFT    LE - HF 3/5, KE 3/5 ankle 2/5                    RIGHT LE - HF 3/5, KE 3/5 ankle 2/5      Sensory - Decreased to light touch bilateral lower extremities      Coordination - impaired lower extremities   Psychiatric - Mood stable, Affect WNL    Skin:  R foot plantar aspect lateral/distal side 7cm x 5.5cm ischemic wound from pressors  RLE 4th digit 1x1.5cm ischemic induced wound from pressors  RLE 5th digit 3x2cm ischemic induced wound from pressors  L foot plantar aspect lateral/distal side 6x7cm ischemic induced wound from pressor usage  LLE 4th digit 2x1cm ischemic induced wound from pressors  LLE 5th digit 2.5x3cm ischemic induced wound from pressors   R groin skin tear 1x1.5cm  Unstageable pressure wound cocyx 7cmx4.5cm w/ 2.5cm depth  R buttock pressure induced wound semi contiguous with coccyx wound calling unstageable given prior debridement 8.5x5.5cm  L buttock 1.0x0.5cm stage 3 pressure injury  R buttock skin tear 3.5cm x 0.5cm and 1.5cmx0.5cm    MMS--antigravity upper extremities    LABS:                        9.0    6.73  )-----------( 335      ( 19 May 2025 07:20 )             30.4     05-19    143  |  104  |  9   ----------------------------<  89  3.7   |  30  |  1.02    Ca    9.3      19 May 2025 07:20    TPro  7.0  /  Alb  2.2[L]  /  TBili  0.4  /  DBili  x   /  AST  12  /  ALT  12  /  AlkPhos  52  05-19      Urinalysis Basic - ( 19 May 2025 07:20 )    Color: x / Appearance: x / SG: x / pH: x  Gluc: 89 mg/dL / Ketone: x  / Bili: x / Urobili: x   Blood: x / Protein: x / Nitrite: x   Leuk Esterase: x / RBC: x / WBC x   Sq Epi: x / Non Sq Epi: x / Bacteria: x        MEDICATIONS  (STANDING):  amLODIPine   Tablet 10 milliGRAM(s) Oral daily  ammonium lactate 12% Lotion 1 Application(s) Topical two times a day  apixaban 5 milliGRAM(s) Oral two times a day  ascorbic acid 500 milliGRAM(s) Oral daily  bisacodyl 10 milliGRAM(s) Oral at bedtime  capsaicin 0.025% Cream 1 Application(s) Topical four times a day  clotrimazole 1% Cream 1 Application(s) Topical <User Schedule>  collagenase Ointment 1 Application(s) Topical two times a day  Dakins Solution - 1/4 Strength 1 Application(s) Topical two times a day  dextrose 5%. 1000 milliLiter(s) (100 mL/Hr) IV Continuous <Continuous>  dextrose 5%. 1000 milliLiter(s) (50 mL/Hr) IV Continuous <Continuous>  dextrose 50% Injectable 25 Gram(s) IV Push once  dextrose 50% Injectable 12.5 Gram(s) IV Push once  dextrose 50% Injectable 25 Gram(s) IV Push once  DULoxetine 60 milliGRAM(s) Oral daily  gabapentin 1200 milliGRAM(s) Oral every 8 hours  glucagon  Injectable 1 milliGRAM(s) IntraMuscular once  influenza   Vaccine 0.5 milliLiter(s) IntraMuscular once  lidocaine   4% Patch 1 Patch Transdermal daily  lidocaine   4% Patch 1 Patch Transdermal daily  magnesium oxide 400 milliGRAM(s) Oral three times a day with meals  melatonin 9 milliGRAM(s) Oral at bedtime  multivitamin 1 Tablet(s) Oral daily  mupirocin 2% Ointment 1 Application(s) Topical two times a day  naloxegol 25 milliGRAM(s) Oral daily  pantoprazole    Tablet 40 milliGRAM(s) Oral before breakfast  petrolatum white Ointment 1 Application(s) Topical every 8 hours  polyethylene glycol 3350 17 Gram(s) Oral two times a day  povidone iodine 10% Solution 1 Application(s) Topical daily  QUEtiapine 25 milliGRAM(s) Oral at bedtime  sevelamer carbonate 800 milliGRAM(s) Oral three times a day with meals  silver nitrate Applicator 6 Application(s) Topical once  tamsulosin 0.8 milliGRAM(s) Oral at bedtime  zinc sulfate 220 milliGRAM(s) Oral daily    MEDICATIONS  (PRN):  albuterol/ipratropium for Nebulization 3 milliLiter(s) Nebulizer every 6 hours PRN Shortness of Breath and/or Wheezing  dextrose Oral Gel 15 Gram(s) Oral once PRN Blood Glucose LESS THAN 70 milliGRAM(s)/deciliter  oxyCODONE    IR 20 milliGRAM(s) Oral every 6 hours PRN Severe Pain (7 - 10)  oxyCODONE    IR 15 milliGRAM(s) Oral two times a day PRN Moderate Pain (4 - 6)  oxyCODONE    IR 10 milliGRAM(s) Oral two times a day PRN Mild Pain (1 - 3)     Subjective  Patient seen and examined this AM.   Girlfriend present during encounter.   Admits to sleeping well. No pain symptoms at rest   Experiencing sole of foot pain over the areas with skin break--distal sole of foot.   Awaiting offloading shoes.     ROS--no acute pain, no fever or chest pain   B/l leg and sacral ulcers  LBM 5/19    Function  -Ambulated 15' with bariatric RW needing MOD A x 2 on each side to support  trunk/pelvis extension to maintain upright posture and close wheelchair follow  for safety. Modified 3 point step to gait leading with Right LE. Decreased  clearance/step length noted landing in foot flat positioning.  Distances limited  by pain and fatigue requiring extended seated rest breaks.  -Tolerated 2 walks in total with recovery time    OT--Had body shower today--pain on b/l feet around ulcers, limiting attempt at wt bearing   Supine to sit with contact guard assist  Sit to stand with Minimum assist of 2 people with bed height elevated (despite  at lowest height setting as it is a CCU bed). Anxiety noted with transfer,  increase time and cues to perform.  Transfer stand pivot with rolling walker with minimum assist of 2 people -  maximum encouragement as patient with increase/immense pain on bilatearal feet  where wounds are  Transfer to rolling shower chair via rolling walker.  Wheeled to shower room via rolling shower chair  Showering with long      Vital Signs Last 24 Hrs  T(C): 36.8 (20 May 2025 09:03), Max: 36.8 (20 May 2025 09:03)  T(F): 98.3 (20 May 2025 09:03), Max: 98.3 (20 May 2025 09:03)  HR: 103 (20 May 2025 09:03) (89 - 103)  BP: 119/71 (20 May 2025 09:03) (107/72 - 119/71)  RR: 16 (20 May 2025 09:03) (16 - 16)  SpO2: 93% (20 May 2025 09:03) (93% - 98%)    EXAM  Gen - Comfortable,   HEENT - NAD  Neck - Supple, No limited ROM  Pulm - Clear  Cardiovascular - RRR, S1S2, No murmurs  Abdomen - Soft, non tender, +BS  Extremities - chronic skin changes with hyperpigmentation at shin      Neuro-  AAO X 3, Speech is normal     Motor - UE 5/5                    LEFT    LE - HF 3/5, KE 3/5 ankle 2/5                    RIGHT LE - HF 3/5, KE 3/5 ankle 2/5      Sensory - Decreased to light touch bilateral lower extremities      Coordination - impaired lower extremities   Psychiatric - Mood stable, Affect WNL    Skin:  R foot plantar aspect lateral/distal side 7cm x 5.5cm ischemic wound from pressors  RLE 4th digit 1x1.5cm ischemic induced wound from pressors  RLE 5th digit 3x2cm ischemic induced wound from pressors  L foot plantar aspect lateral/distal side 6x7cm ischemic induced wound from pressor usage  LLE 4th digit 2x1cm ischemic induced wound from pressors  LLE 5th digit 2.5x3cm ischemic induced wound from pressors   R groin skin tear 1x1.5cm  Unstageable pressure wound cocyx 7cmx4.5cm w/ 2.5cm depth  R buttock pressure induced wound semi contiguous with coccyx wound calling unstageable given prior debridement 8.5x5.5cm  L buttock 1.0x0.5cm stage 3 pressure injury  R buttock skin tear 3.5cm x 0.5cm and 1.5cmx0.5cm    MMS--antigravity upper extremities    LABS:                        9.0    6.73  )-----------( 335      ( 19 May 2025 07:20 )             30.4     05-19    143  |  104  |  9   ----------------------------<  89  3.7   |  30  |  1.02    Ca    9.3      19 May 2025 07:20    TPro  7.0  /  Alb  2.2[L]  /  TBili  0.4  /  DBili  x   /  AST  12  /  ALT  12  /  AlkPhos  52  05-19      Urinalysis Basic - ( 19 May 2025 07:20 )    Color: x / Appearance: x / SG: x / pH: x  Gluc: 89 mg/dL / Ketone: x  / Bili: x / Urobili: x   Blood: x / Protein: x / Nitrite: x   Leuk Esterase: x / RBC: x / WBC x   Sq Epi: x / Non Sq Epi: x / Bacteria: x        MEDICATIONS  (STANDING):  amLODIPine   Tablet 10 milliGRAM(s) Oral daily  ammonium lactate 12% Lotion 1 Application(s) Topical two times a day  apixaban 5 milliGRAM(s) Oral two times a day  ascorbic acid 500 milliGRAM(s) Oral daily  bisacodyl 10 milliGRAM(s) Oral at bedtime  capsaicin 0.025% Cream 1 Application(s) Topical four times a day  clotrimazole 1% Cream 1 Application(s) Topical <User Schedule>  collagenase Ointment 1 Application(s) Topical two times a day  Dakins Solution - 1/4 Strength 1 Application(s) Topical two times a day  dextrose 5%. 1000 milliLiter(s) (100 mL/Hr) IV Continuous <Continuous>  dextrose 5%. 1000 milliLiter(s) (50 mL/Hr) IV Continuous <Continuous>  dextrose 50% Injectable 25 Gram(s) IV Push once  dextrose 50% Injectable 12.5 Gram(s) IV Push once  dextrose 50% Injectable 25 Gram(s) IV Push once  DULoxetine 60 milliGRAM(s) Oral daily  gabapentin 1200 milliGRAM(s) Oral every 8 hours  glucagon  Injectable 1 milliGRAM(s) IntraMuscular once  influenza   Vaccine 0.5 milliLiter(s) IntraMuscular once  lidocaine   4% Patch 1 Patch Transdermal daily  lidocaine   4% Patch 1 Patch Transdermal daily  magnesium oxide 400 milliGRAM(s) Oral three times a day with meals  melatonin 9 milliGRAM(s) Oral at bedtime  multivitamin 1 Tablet(s) Oral daily  mupirocin 2% Ointment 1 Application(s) Topical two times a day  naloxegol 25 milliGRAM(s) Oral daily  pantoprazole    Tablet 40 milliGRAM(s) Oral before breakfast  petrolatum white Ointment 1 Application(s) Topical every 8 hours  polyethylene glycol 3350 17 Gram(s) Oral two times a day  povidone iodine 10% Solution 1 Application(s) Topical daily  QUEtiapine 25 milliGRAM(s) Oral at bedtime  sevelamer carbonate 800 milliGRAM(s) Oral three times a day with meals  silver nitrate Applicator 6 Application(s) Topical once  tamsulosin 0.8 milliGRAM(s) Oral at bedtime  zinc sulfate 220 milliGRAM(s) Oral daily    MEDICATIONS  (PRN):  albuterol/ipratropium for Nebulization 3 milliLiter(s) Nebulizer every 6 hours PRN Shortness of Breath and/or Wheezing  dextrose Oral Gel 15 Gram(s) Oral once PRN Blood Glucose LESS THAN 70 milliGRAM(s)/deciliter  oxyCODONE    IR 20 milliGRAM(s) Oral every 6 hours PRN Severe Pain (7 - 10)  oxyCODONE    IR 15 milliGRAM(s) Oral two times a day PRN Moderate Pain (4 - 6)  oxyCODONE    IR 10 milliGRAM(s) Oral two times a day PRN Mild Pain (1 - 3)

## 2025-05-21 PROCEDURE — 99233 SBSQ HOSP IP/OBS HIGH 50: CPT

## 2025-05-21 PROCEDURE — 93970 EXTREMITY STUDY: CPT | Mod: 26

## 2025-05-21 PROCEDURE — 99232 SBSQ HOSP IP/OBS MODERATE 35: CPT

## 2025-05-21 RX ORDER — PREGABALIN 50 MG/1
50 CAPSULE ORAL ONCE
Refills: 0 | Status: DISCONTINUED | OUTPATIENT
Start: 2025-05-21 | End: 2025-05-21

## 2025-05-21 RX ORDER — PREGABALIN 50 MG/1
25 CAPSULE ORAL
Refills: 0 | Status: DISCONTINUED | OUTPATIENT
Start: 2025-05-22 | End: 2025-05-28

## 2025-05-21 RX ADMIN — Medication 1 APPLICATION(S): at 06:48

## 2025-05-21 RX ADMIN — Medication 10 MILLIGRAM(S): at 21:29

## 2025-05-21 RX ADMIN — SEVELAMER HYDROCHLORIDE 800 MILLIGRAM(S): 800 TABLET ORAL at 17:21

## 2025-05-21 RX ADMIN — Medication 400 MILLIGRAM(S): at 17:24

## 2025-05-21 RX ADMIN — SEVELAMER HYDROCHLORIDE 800 MILLIGRAM(S): 800 TABLET ORAL at 12:20

## 2025-05-21 RX ADMIN — Medication 1 APPLICATION(S): at 06:50

## 2025-05-21 RX ADMIN — QUETIAPINE FUMARATE 25 MILLIGRAM(S): 25 TABLET ORAL at 21:29

## 2025-05-21 RX ADMIN — NALOXEGOL OXALATE 25 MILLIGRAM(S): 12.5 TABLET, FILM COATED ORAL at 12:20

## 2025-05-21 RX ADMIN — SEVELAMER HYDROCHLORIDE 800 MILLIGRAM(S): 800 TABLET ORAL at 09:22

## 2025-05-21 RX ADMIN — OXYCODONE HYDROCHLORIDE 20 MILLIGRAM(S): 30 TABLET ORAL at 21:28

## 2025-05-21 RX ADMIN — APIXABAN 5 MILLIGRAM(S): 2.5 TABLET, FILM COATED ORAL at 17:21

## 2025-05-21 RX ADMIN — SODIUM HYPOCHLORITE 1 APPLICATION(S): 0.12 SOLUTION TOPICAL at 06:50

## 2025-05-21 RX ADMIN — OXYCODONE HYDROCHLORIDE 15 MILLIGRAM(S): 30 TABLET ORAL at 10:37

## 2025-05-21 RX ADMIN — GABAPENTIN 1200 MILLIGRAM(S): 400 CAPSULE ORAL at 06:08

## 2025-05-21 RX ADMIN — Medication 400 MILLIGRAM(S): at 09:22

## 2025-05-21 RX ADMIN — Medication 1 APPLICATION(S): at 23:10

## 2025-05-21 RX ADMIN — Medication 9 MILLIGRAM(S): at 23:08

## 2025-05-21 RX ADMIN — Medication 500 MILLIGRAM(S): at 12:20

## 2025-05-21 RX ADMIN — GABAPENTIN 1200 MILLIGRAM(S): 400 CAPSULE ORAL at 21:28

## 2025-05-21 RX ADMIN — Medication 220 MILLIGRAM(S): at 12:21

## 2025-05-21 RX ADMIN — PREGABALIN 50 MILLIGRAM(S): 50 CAPSULE ORAL at 13:39

## 2025-05-21 RX ADMIN — APIXABAN 5 MILLIGRAM(S): 2.5 TABLET, FILM COATED ORAL at 06:09

## 2025-05-21 RX ADMIN — OXYCODONE HYDROCHLORIDE 20 MILLIGRAM(S): 30 TABLET ORAL at 09:23

## 2025-05-21 RX ADMIN — MUPIROCIN CALCIUM 1 APPLICATION(S): 20 CREAM TOPICAL at 06:50

## 2025-05-21 RX ADMIN — OXYCODONE HYDROCHLORIDE 20 MILLIGRAM(S): 30 TABLET ORAL at 21:58

## 2025-05-21 RX ADMIN — GABAPENTIN 1200 MILLIGRAM(S): 400 CAPSULE ORAL at 13:36

## 2025-05-21 RX ADMIN — Medication 400 MILLIGRAM(S): at 12:20

## 2025-05-21 RX ADMIN — Medication 40 MILLIGRAM(S): at 06:09

## 2025-05-21 RX ADMIN — WHITE PETROLATUM 1 APPLICATION(S): 1 OINTMENT TOPICAL at 21:33

## 2025-05-21 RX ADMIN — TAMSULOSIN HYDROCHLORIDE 0.8 MILLIGRAM(S): 0.4 CAPSULE ORAL at 21:28

## 2025-05-21 RX ADMIN — OXYCODONE HYDROCHLORIDE 10 MILLIGRAM(S): 30 TABLET ORAL at 12:20

## 2025-05-21 RX ADMIN — Medication 1 APPLICATION(S): at 06:10

## 2025-05-21 RX ADMIN — Medication 1 TABLET(S): at 12:20

## 2025-05-21 RX ADMIN — DULOXETINE 60 MILLIGRAM(S): 20 CAPSULE, DELAYED RELEASE ORAL at 12:20

## 2025-05-21 NOTE — PROGRESS NOTE ADULT - SUBJECTIVE AND OBJECTIVE BOX
Subjective  Patient seen and examined this AM.   Admits to sleeping well. No pain symptoms at rest   Experiencing sole of foot pain over the areas with skin break--distal sole of foot.   Awaiting offloading shoes.   Discussed family to purchase donut cushion to encourage out of bed.     ROS--no acute pain, no fever or chest pain   B/l leg and sacral ulcers  LBM 5/20    Function  -Ambulated 15' with bariatric RW needing MOD A x 2 on each side to support  trunk/pelvis extension to maintain upright posture and close wheelchair follow  for safety. Modified 3 point step to gait leading with Right LE. Decreased  clearance/step length noted landing in foot flat positioning.  Distances limited  by pain and fatigue requiring extended seated rest breaks.  -Tolerated 2 walks in total with recovery time    OT--Had body shower today--pain on b/l feet around ulcers, limiting attempt at wt bearing   Supine to sit with contact guard assist  Sit to stand with Minimum assist of 2 people with bed height elevated (despite  at lowest height setting as it is a CCU bed). Anxiety noted with transfer,  increase time and cues to perform.  Transfer stand pivot with rolling walker with minimum assist of 2 people -  maximum encouragement as patient with increase/immense pain on bilatearal feet  where wounds are  Transfer to rolling shower chair via rolling walker.  Wheeled to shower room via rolling shower chair  Showering with long      Vital Signs Last 24 Hrs  T(C): 37.7 (20 May 2025 20:02), Max: 37.7 (20 May 2025 20:02)  T(F): 99.8 (20 May 2025 20:02), Max: 99.8 (20 May 2025 20:02)  HR: 98 (21 May 2025 06:04) (98 - 106)  BP: 93/65 (21 May 2025 05:45) (93/65 - 108/76)  BP(mean): --  RR: 16 (21 May 2025 05:45) (16 - 16)  SpO2: 99% (21 May 2025 06:04) (95% - 99%)    EXAM  Gen - Comfortable,   HEENT - NAD  Neck - Supple, No limited ROM  Pulm - Clear  Cardiovascular - RRR, S1S2, No murmurs  Abdomen - Soft, non tender, +BS  Extremities - chronic skin changes with hyperpigmentation at shin      Neuro-  AAO X 3, Speech is normal     Motor - UE 5/5                    LEFT    LE - HF 3/5, KE 3/5 ankle 2/5                    RIGHT LE - HF 3/5, KE 3/5 ankle 2/5      Sensory - Decreased to light touch bilateral lower extremities      Coordination - impaired lower extremities   Psychiatric - Mood stable, Affect WNL    Skin:  R foot plantar aspect lateral/distal side 7cm x 5.5cm ischemic wound from pressors  RLE 4th digit 1x1.5cm ischemic induced wound from pressors  RLE 5th digit 3x2cm ischemic induced wound from pressors  L foot plantar aspect lateral/distal side 6x7cm ischemic induced wound from pressor usage  LLE 4th digit 2x1cm ischemic induced wound from pressors  LLE 5th digit 2.5x3cm ischemic induced wound from pressors   R groin skin tear 1x1.5cm  Unstageable pressure wound cocyx 7cmx4.5cm w/ 2.5cm depth  R buttock pressure induced wound semi contiguous with coccyx wound calling unstageable given prior debridement 8.5x5.5cm  L buttock 1.0x0.5cm stage 3 pressure injury  R buttock skin tear 3.5cm x 0.5cm and 1.5cmx0.5cm    MMS--antigravity upper extremities    LABS:                        9.0    6.73  )-----------( 335      ( 19 May 2025 07:20 )             30.4     05-19    143  |  104  |  9   ----------------------------<  89  3.7   |  30  |  1.02    Ca    9.3      19 May 2025 07:20    TPro  7.0  /  Alb  2.2[L]  /  TBili  0.4  /  DBili  x   /  AST  12  /  ALT  12  /  AlkPhos  52  05-19      Urinalysis Basic - ( 19 May 2025 07:20 )    Color: x / Appearance: x / SG: x / pH: x  Gluc: 89 mg/dL / Ketone: x  / Bili: x / Urobili: x   Blood: x / Protein: x / Nitrite: x   Leuk Esterase: x / RBC: x / WBC x   Sq Epi: x / Non Sq Epi: x / Bacteria: x        MEDICATIONS  (STANDING):  amLODIPine   Tablet 10 milliGRAM(s) Oral daily  ammonium lactate 12% Lotion 1 Application(s) Topical two times a day  apixaban 5 milliGRAM(s) Oral two times a day  ascorbic acid 500 milliGRAM(s) Oral daily  bisacodyl 10 milliGRAM(s) Oral at bedtime  capsaicin 0.025% Cream 1 Application(s) Topical four times a day  clotrimazole 1% Cream 1 Application(s) Topical <User Schedule>  collagenase Ointment 1 Application(s) Topical two times a day  Dakins Solution - 1/4 Strength 1 Application(s) Topical two times a day  dextrose 5%. 1000 milliLiter(s) (100 mL/Hr) IV Continuous <Continuous>  dextrose 5%. 1000 milliLiter(s) (50 mL/Hr) IV Continuous <Continuous>  dextrose 50% Injectable 25 Gram(s) IV Push once  dextrose 50% Injectable 12.5 Gram(s) IV Push once  dextrose 50% Injectable 25 Gram(s) IV Push once  DULoxetine 60 milliGRAM(s) Oral daily  gabapentin 1200 milliGRAM(s) Oral every 8 hours  glucagon  Injectable 1 milliGRAM(s) IntraMuscular once  influenza   Vaccine 0.5 milliLiter(s) IntraMuscular once  lidocaine   4% Patch 1 Patch Transdermal daily  lidocaine   4% Patch 1 Patch Transdermal daily  magnesium oxide 400 milliGRAM(s) Oral three times a day with meals  melatonin 9 milliGRAM(s) Oral at bedtime  multivitamin 1 Tablet(s) Oral daily  mupirocin 2% Ointment 1 Application(s) Topical two times a day  naloxegol 25 milliGRAM(s) Oral daily  pantoprazole    Tablet 40 milliGRAM(s) Oral before breakfast  petrolatum white Ointment 1 Application(s) Topical every 8 hours  polyethylene glycol 3350 17 Gram(s) Oral two times a day  povidone iodine 10% Solution 1 Application(s) Topical daily  QUEtiapine 25 milliGRAM(s) Oral at bedtime  sevelamer carbonate 800 milliGRAM(s) Oral three times a day with meals  silver nitrate Applicator 6 Application(s) Topical once  tamsulosin 0.8 milliGRAM(s) Oral at bedtime  zinc sulfate 220 milliGRAM(s) Oral daily    MEDICATIONS  (PRN):  albuterol/ipratropium for Nebulization 3 milliLiter(s) Nebulizer every 6 hours PRN Shortness of Breath and/or Wheezing  dextrose Oral Gel 15 Gram(s) Oral once PRN Blood Glucose LESS THAN 70 milliGRAM(s)/deciliter  oxyCODONE    IR 20 milliGRAM(s) Oral every 6 hours PRN Severe Pain (7 - 10)  oxyCODONE    IR 15 milliGRAM(s) Oral two times a day PRN Moderate Pain (4 - 6)  oxyCODONE    IR 10 milliGRAM(s) Oral two times a day PRN Mild Pain (1 - 3)     Subjective  Patient seen and examined this AM.   Admits to sleeping well. No pain symptoms at rest   Experiencing sole of foot pain over the areas with skin break--distal sole of foot.   Awaiting offloading shoes.   Discussed family to purchase donut cushion to encourage out of bed.     ROS--no acute pain, no fever or chest pain   B/l leg and sacral ulcers  LBM 5/20    Function  -Ambulated 3x10' in // bars min Ax2 on each side to support trunk/pelvis  extension to maintain upright posture and close wheelchair follow for safety.  Modified 3 point step to gait leading with Right LE. Decreased clearance/step  length noted landing in foot flat positioning.  Distances limited by pain and  fatigue requiring extended seated rest breaks.  Pt reports pain in his b/l LEs but is able to ambulate with increased rest  breaks.      Vital Signs Last 24 Hrs  T(C): 37.7 (20 May 2025 20:02), Max: 37.7 (20 May 2025 20:02)  T(F): 99.8 (20 May 2025 20:02), Max: 99.8 (20 May 2025 20:02)  HR: 98 (21 May 2025 06:04) (98 - 106)  BP: 93/65 (21 May 2025 05:45) (93/65 - 108/76)  BP(mean): --  RR: 16 (21 May 2025 05:45) (16 - 16)  SpO2: 99% (21 May 2025 06:04) (95% - 99%)    EXAM  Gen - Comfortable,   HEENT - NAD  Neck - Supple, No limited ROM  Pulm - Clear  Cardiovascular - RRR, S1S2, No murmurs  Abdomen - Soft, non tender, +BS  Extremities - chronic skin changes with hyperpigmentation at shin      Neuro-  AAO X 3, Speech is normal     Motor - UE 5/5                    LEFT    LE - HF 3/5, KE 3/5 ankle 2/5                    RIGHT LE - HF 3/5, KE 3/5 ankle 2/5      Sensory - Decreased to light touch bilateral lower extremities      Coordination - impaired lower extremities   Psychiatric - Mood stable, Affect WNL    Skin:  R foot plantar aspect lateral/distal side 7cm x 5.5cm ischemic wound from pressors  RLE 4th digit 1x1.5cm ischemic induced wound from pressors  RLE 5th digit 3x2cm ischemic induced wound from pressors  L foot plantar aspect lateral/distal side 6x7cm ischemic induced wound from pressor usage  LLE 4th digit 2x1cm ischemic induced wound from pressors  LLE 5th digit 2.5x3cm ischemic induced wound from pressors   R groin skin tear 1x1.5cm  Unstageable pressure wound cocyx 7cmx4.5cm w/ 2.5cm depth  R buttock pressure induced wound semi contiguous with coccyx wound calling unstageable given prior debridement 8.5x5.5cm  L buttock 1.0x0.5cm stage 3 pressure injury  R buttock skin tear 3.5cm x 0.5cm and 1.5cmx0.5cm  Dry skin with callus on sole of foot     MMS--antigravity upper extremities    LABS:                        9.0    6.73  )-----------( 335      ( 19 May 2025 07:20 )             30.4     05-19    143  |  104  |  9   ----------------------------<  89  3.7   |  30  |  1.02    Ca    9.3      19 May 2025 07:20    TPro  7.0  /  Alb  2.2[L]  /  TBili  0.4  /  DBili  x   /  AST  12  /  ALT  12  /  AlkPhos  52  05-19      Urinalysis Basic - ( 19 May 2025 07:20 )    Color: x / Appearance: x / SG: x / pH: x  Gluc: 89 mg/dL / Ketone: x  / Bili: x / Urobili: x   Blood: x / Protein: x / Nitrite: x   Leuk Esterase: x / RBC: x / WBC x   Sq Epi: x / Non Sq Epi: x / Bacteria: x        MEDICATIONS  (STANDING):  amLODIPine   Tablet 10 milliGRAM(s) Oral daily  ammonium lactate 12% Lotion 1 Application(s) Topical two times a day  apixaban 5 milliGRAM(s) Oral two times a day  ascorbic acid 500 milliGRAM(s) Oral daily  bisacodyl 10 milliGRAM(s) Oral at bedtime  capsaicin 0.025% Cream 1 Application(s) Topical four times a day  clotrimazole 1% Cream 1 Application(s) Topical <User Schedule>  collagenase Ointment 1 Application(s) Topical two times a day  Dakins Solution - 1/4 Strength 1 Application(s) Topical two times a day  dextrose 5%. 1000 milliLiter(s) (100 mL/Hr) IV Continuous <Continuous>  dextrose 5%. 1000 milliLiter(s) (50 mL/Hr) IV Continuous <Continuous>  dextrose 50% Injectable 25 Gram(s) IV Push once  dextrose 50% Injectable 12.5 Gram(s) IV Push once  dextrose 50% Injectable 25 Gram(s) IV Push once  DULoxetine 60 milliGRAM(s) Oral daily  gabapentin 1200 milliGRAM(s) Oral every 8 hours  glucagon  Injectable 1 milliGRAM(s) IntraMuscular once  influenza   Vaccine 0.5 milliLiter(s) IntraMuscular once  lidocaine   4% Patch 1 Patch Transdermal daily  lidocaine   4% Patch 1 Patch Transdermal daily  magnesium oxide 400 milliGRAM(s) Oral three times a day with meals  melatonin 9 milliGRAM(s) Oral at bedtime  multivitamin 1 Tablet(s) Oral daily  mupirocin 2% Ointment 1 Application(s) Topical two times a day  naloxegol 25 milliGRAM(s) Oral daily  pantoprazole    Tablet 40 milliGRAM(s) Oral before breakfast  petrolatum white Ointment 1 Application(s) Topical every 8 hours  polyethylene glycol 3350 17 Gram(s) Oral two times a day  povidone iodine 10% Solution 1 Application(s) Topical daily  QUEtiapine 25 milliGRAM(s) Oral at bedtime  sevelamer carbonate 800 milliGRAM(s) Oral three times a day with meals  silver nitrate Applicator 6 Application(s) Topical once  tamsulosin 0.8 milliGRAM(s) Oral at bedtime  zinc sulfate 220 milliGRAM(s) Oral daily    MEDICATIONS  (PRN):  albuterol/ipratropium for Nebulization 3 milliLiter(s) Nebulizer every 6 hours PRN Shortness of Breath and/or Wheezing  dextrose Oral Gel 15 Gram(s) Oral once PRN Blood Glucose LESS THAN 70 milliGRAM(s)/deciliter  oxyCODONE    IR 20 milliGRAM(s) Oral every 6 hours PRN Severe Pain (7 - 10)  oxyCODONE    IR 15 milliGRAM(s) Oral two times a day PRN Moderate Pain (4 - 6)  oxyCODONE    IR 10 milliGRAM(s) Oral two times a day PRN Mild Pain (1 - 3)

## 2025-05-21 NOTE — PROGRESS NOTE ADULT - ASSESSMENT
39 year old male with PMH of morbid obesity, BEA on CPAP, pAFIB (not on AC), HTN, and BL papilledema attributed to pseudotumor cerebri; who initially presented to  on 2/24 with SOB and BL LE edema. Found to have URI with progressive SOB and multifocal PNA on imaging. His hospital course was complicated by worsening respiratory distress and increased 02 demands secondary to secretions (requiring multiple intubation attempts) and eventual MICU admission. While in MICU, he 02 requirements continued despite optimal vent management. Concern noted for progressive ARDS, unable to prone given morbid obesity, and ultimately cannulated for vvECMO on 2/25 and transferred to [facility] for further management on 2/26.  His hospital course at [facility] was significant for the following: diuretic and ABX management, s/p trache and PEG (placed on 3/7- PEG since removed), RCU admission, high grade fevers (secondary to panniculitis), progressively worsening sacral ulcer (likely osteomyelitis- s/p surgical debridement), BL foot wound (secondary to pressor use), neuropathy (secondary to critical illness polyneuropathy), ELLA (secondary to vancomycin toxicity and overdiuresis- since DCd- with improvement). Patient now admitted for a multidisciplinary rehab program. 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  (present on admission)   - Gait Instability, ADL impairments and Functional impairments: start Comprehensive Rehab Program of PT/OT  - 3 hours a day, 5 days a week   - PRN: Nebulizer QID     #BEA on BIPAP  --via nasal canula qhs    #Sacral Osteomyelitis  - Zosyn TID - completed on 5/12/25    #Paroxysmal AFIB  - Eliquis 5mg BID     #Calf Tenderness  - BL LE doppler negative     #L hip pain - resolved resolved   - no acute findings on XR or CT   - L hip MRI (5/7) no significant findings     #HTN  - Amlodipine 10mg dialy     #Sleep/Mood  - Melatonin 9mg at HS   - Quetiapine 25mg at HS     #Skin  Wound Recs per Plastic Surgery (5/16):  Sacral Pressure Injury: BID- Cleanse with NS, remove excess fluid, apply santyl ointment to sloughing tissues, pack wound with 1-inch dry plain packing, cover with 4 inch gauze and abd combine  - R buttock: BID- Cleanse with NS, overlay calcium alginate, ABD combine dressing  Additional wound care instructions:  -->Right anterior thigh to groin isolated wound: Clean wound and periwound skin with NS. Pat dry. Apply liquid barrier film to periwound skin, allow to dry. Cover with silicone foam with border. Change daily or PRN if soiled   --> Bilateral abdominal pannus, bilateral groin: Cleanse with luke-warm soap and water, dry well. Apply clotrimazole  --> Bilateral feet: Apply betadine  to bilateral foot wounds followed by 4x4 gauze, ABD pad, and matilde daily per podiatry.  --> Continue to offload pressure; bariatric low airloss support surface, turn and position per protocol with use of fluidized positioning devices, continue incontinence and moisture care per protocol and use of single breathable incontinence pads, continue to offload heels with fluidized positioning devices. Continue use of bariatric seat cushion, limit sit time- no more than 2 consecutive hours at any given time.  - Pressure injury/Skin: OOB to Chair, PT/OT    - Ammonium lactate to BL LEs     #Neuropathy  - Cymbalta 60mg daily   - Gabapentin 1200mg TID     #Pain Mgmt   - Capsaicin cream to BL feet QID - Avoid use on damaged, broken, or irritated skin. Avoid occlusive dressing or heat   - Lidocaine patch to BL thighs   - PRN:; Oxycodone and Tylenol 650mg QID     #Morbid obesity  - Most recent weight 394lbs (5/7)     #GI/Bowel Mgmt   - Pantoprazole  - Bisacodyl 10mg at HS   - Miralax   - Naloxegal 25mg daily     #/Bladder Mgmt   #ELLA  #Urinary Hesitancy  - USKB 5/20 negative   - Renvela 800mg TID  - Cr improving   - Bladder scan TID  - Flomax 0.8mg at HS     #FEN --Morbid obesity (BMI > 40).  - Diet - Regular + Thins  [CCHO]    - MVI     # Health Management:   Environmental challenge affecting dc plan--narrow door and need for ramp to house entrance  However, patient making goal directed effort towards therapy goals     #Precautions / PROPHYLAXIS:   - Falls  - ortho: Weight bearing status: WBAT in surgical shoe   - Lungs: Aspiration, Incentive Spirometer   - DVT: Lovenox  ----------------------------------------------  IDT 5/13  Stand pivot T/F during shower max x 2 persons  Urinary hesitancy noted  Pain b/l feet over areas with skin break on dorsum of foot, limiting wt bearing  Most trasfers still  Sit to stand mod assist x 1 T/f bed to  Bussy board mod assist x 1  Took 4 steps on parallel bars with mo assist x 2  Making progress towards therapy goals  Barrier--ulcers on feet, limiting wt bearing, urinary hesitancy, environmental barrier at home (stairs), severe obesity  LE weakness  Overall making functional progress, has supportive family,  hence decision to extend DC date and achieve same home dc   Ext 5/30 revised to 6/6  (barriers as noted, making functional progress)     ----------------------------------------------  Dr. CANTU's Liaison with Family/Providers:      OUTPATIENT/FOLLOW UP:    Your Primary Care Provider,   Phone: (   )    -  Fax: (   )    -  Follow Up Time: 1 week    [facility],   [address] [place] [address]  Phone: [phone]  Fax: (   )    -  Follow Up Time:    Dr. Waterhouse  Podiatry  [phone]  Within 1 week       39 year old male with PMH of morbid obesity, BEA on CPAP, pAFIB (not on AC), HTN, and BL papilledema attributed to pseudotumor cerebri; who initially presented to  on 2/24 with SOB and BL LE edema. Found to have URI with progressive SOB and multifocal PNA on imaging. His hospital course was complicated by worsening respiratory distress and increased 02 demands secondary to secretions (requiring multiple intubation attempts) and eventual MICU admission. While in MICU, he 02 requirements continued despite optimal vent management. Concern noted for progressive ARDS, unable to prone given morbid obesity, and ultimately cannulated for vvECMO on 2/25 and transferred to [facility] for further management on 2/26.  His hospital course at [facility] was significant for the following: diuretic and ABX management, s/p trache and PEG (placed on 3/7- PEG since removed), RCU admission, high grade fevers (secondary to panniculitis), progressively worsening sacral ulcer (likely osteomyelitis- s/p surgical debridement), BL foot wound (secondary to pressor use), neuropathy (secondary to critical illness polyneuropathy), ELLA (secondary to vancomycin toxicity and overdiuresis- since DCd- with improvement). Patient now admitted for a multidisciplinary rehab program. 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  (present on admission)   - Gait Instability, ADL impairments and Functional impairments: start Comprehensive Rehab Program of PT/OT  - 3 hours a day, 5 days a week   - PRN: Nebulizer QID     #BEA on BIPAP  --via nasal canula qhs    #Sacral Osteomyelitis  - Zosyn TID - completed on 5/12/25    #Paroxysmal AFIB  - Eliquis 5mg BID     #Calf Tenderness  - BL LE doppler negative     #L hip pain - resolved resolved   - no acute findings on XR or CT   - L hip MRI (5/7) no significant findings     #HTN  - Amlodipine 10mg dialy     #Sleep/Mood  - Melatonin 9mg at HS   - Quetiapine 25mg at HS     #Skin  Wound Recs per Plastic Surgery (5/16):  Sacral Pressure Injury: BID- Cleanse with NS, remove excess fluid, apply santyl ointment to sloughing tissues, pack wound with 1-inch dry plain packing, cover with 4 inch gauze and abd combine  - R buttock: BID- Cleanse with NS, overlay calcium alginate, ABD combine dressing  Additional wound care instructions:  -->Right anterior thigh to groin isolated wound: Clean wound and periwound skin with NS. Pat dry. Apply liquid barrier film to periwound skin, allow to dry. Cover with silicone foam with border. Change daily or PRN if soiled   --> Bilateral abdominal pannus, bilateral groin: Cleanse with luke-warm soap and water, dry well. Apply clotrimazole  --> Bilateral feet: Apply betadine  to bilateral foot wounds followed by 4x4 gauze, ABD pad, and matilde daily per podiatry.  --> Continue to offload pressure; bariatric low airloss support surface, turn and position per protocol with use of fluidized positioning devices, continue incontinence and moisture care per protocol and use of single breathable incontinence pads, continue to offload heels with fluidized positioning devices. Continue use of bariatric seat cushion, limit sit time- no more than 2 consecutive hours at any given time.  - Pressure injury/Skin: OOB to Chair, PT/OT    - Ammonium lactate to BL LEs     #Neuropathy  - Cymbalta 60mg daily   - Gabapentin 1200mg TID     #Pain Mgmt   - Capsaicin cream to BL feet QID - Avoid use on damaged, broken, or irritated skin. Avoid occlusive dressing or heat   - Lidocaine patch to BL thighs   - PRN:; Oxycodone and Tylenol 650mg QID     #Morbid obesity  - Most recent weight 394lbs (5/7)     #GI/Bowel Mgmt   - Pantoprazole  - Bisacodyl 10mg at HS   - Miralax   - Naloxegal 25mg daily     #/Bladder Mgmt   #ELLA  #Urinary Hesitancy  - USKB 5/20 negative   - Renvela 800mg TID  - Cr improving   - Bladder scan TID  - Flomax 0.8mg at HS     #FEN --Morbid obesity (BMI > 40).  - Diet - Regular + Thins  [CCHO]    - MVI     # Health Management:   Environmental challenge affecting dc plan--narrow door and need for ramp to house entrance  However, patient making goal directed effort towards therapy goals     #Precautions / PROPHYLAXIS:   - Falls  - ortho: Weight bearing status: WBAT in surgical shoe   - Lungs: Aspiration, Incentive Spirometer   - DVT: Lovenox  ----------------------------------------------  IDT 5/13  Stand pivot T/F during shower max x 2 persons  Urinary hesitancy noted  Pain b/l feet over areas with skin break on dorsum of foot, limiting wt bearing  Most trasfers still  Sit to stand mod assist x 1 T/f bed to  Bussy board mod assist x 1  Took 4 steps on parallel bars with mo assist x 2  Making progress towards therapy goals  Barrier--ulcers on feet, limiting wt bearing, urinary hesitancy, environmental barrier at home (stairs), severe obesity  LE weakness  Overall making functional progress, has supportive family,  hence decision to extend DC date and achieve same home dc   Ext 5/30 revised to 6/6  (barriers as noted, making functional progress)     ----------------------------------------------  Dr. CANTU's Liaison with Family/Providers:      OUTPATIENT/FOLLOW UP:    Your Primary Care Provider,   Phone: (   )    -  Fax: (   )    -  Follow Up Time: 1 week    [facility] [place],   [address] [place] [address]  Phone: [phone]  Fax: (   )    -  Follow Up Time:    Dr. Waterhouse  Podiatry  [phone]  Within 1 week

## 2025-05-21 NOTE — PROGRESS NOTE ADULT - SUBJECTIVE AND OBJECTIVE BOX
Interval History  Patient seen and examined at bedside. No acute events noted.  Endorses bilateral LE neuropathic pain. No other complaints otherwise.     ALLERGIES:  No Known Allergies    MEDICATIONS  (STANDING):  amLODIPine   Tablet 10 milliGRAM(s) Oral daily  ammonium lactate 12% Lotion 1 Application(s) Topical two times a day  apixaban 5 milliGRAM(s) Oral two times a day  ascorbic acid 500 milliGRAM(s) Oral daily  bisacodyl 10 milliGRAM(s) Oral at bedtime  capsaicin 0.025% Cream 1 Application(s) Topical four times a day  clotrimazole 1% Cream 1 Application(s) Topical <User Schedule>  collagenase Ointment 1 Application(s) Topical two times a day  Dakins Solution - 1/4 Strength 1 Application(s) Topical two times a day  dextrose 5%. 1000 milliLiter(s) (100 mL/Hr) IV Continuous <Continuous>  dextrose 5%. 1000 milliLiter(s) (50 mL/Hr) IV Continuous <Continuous>  dextrose 50% Injectable 25 Gram(s) IV Push once  dextrose 50% Injectable 12.5 Gram(s) IV Push once  dextrose 50% Injectable 25 Gram(s) IV Push once  DULoxetine 60 milliGRAM(s) Oral daily  gabapentin 1200 milliGRAM(s) Oral every 8 hours  glucagon  Injectable 1 milliGRAM(s) IntraMuscular once  influenza   Vaccine 0.5 milliLiter(s) IntraMuscular once  lidocaine   4% Patch 1 Patch Transdermal daily  lidocaine   4% Patch 1 Patch Transdermal daily  magnesium oxide 400 milliGRAM(s) Oral three times a day with meals  melatonin 9 milliGRAM(s) Oral at bedtime  multivitamin 1 Tablet(s) Oral daily  mupirocin 2% Ointment 1 Application(s) Topical two times a day  naloxegol 25 milliGRAM(s) Oral daily  pantoprazole    Tablet 40 milliGRAM(s) Oral before breakfast  petrolatum white Ointment 1 Application(s) Topical every 8 hours  polyethylene glycol 3350 17 Gram(s) Oral two times a day  povidone iodine 10% Solution 1 Application(s) Topical daily  QUEtiapine 25 milliGRAM(s) Oral at bedtime  sevelamer carbonate 800 milliGRAM(s) Oral three times a day with meals  silver nitrate Applicator 6 Application(s) Topical once  tamsulosin 0.8 milliGRAM(s) Oral at bedtime  zinc sulfate 220 milliGRAM(s) Oral daily    MEDICATIONS  (PRN):  albuterol/ipratropium for Nebulization 3 milliLiter(s) Nebulizer every 6 hours PRN Shortness of Breath and/or Wheezing  dextrose Oral Gel 15 Gram(s) Oral once PRN Blood Glucose LESS THAN 70 milliGRAM(s)/deciliter  oxyCODONE    IR 20 milliGRAM(s) Oral every 6 hours PRN Severe Pain (7 - 10)  oxyCODONE    IR 15 milliGRAM(s) Oral two times a day PRN Moderate Pain (4 - 6)  oxyCODONE    IR 10 milliGRAM(s) Oral two times a day PRN Mild Pain (1 - 3)    Vital Signs Last 24 Hrs  T(F): 99.8 (20 May 2025 20:02), Max: 99.8 (20 May 2025 20:02)  HR: 98 (21 May 2025 06:04) (98 - 106)  BP: 93/65 (21 May 2025 05:45) (93/65 - 108/76)  RR: 16 (21 May 2025 05:45) (16 - 16)  SpO2: 99% (21 May 2025 06:04) (95% - 99%)  I&O's Summary    20 May 2025 07:01  -  21 May 2025 07:00  --------------------------------------------------------  IN: 0 mL / OUT: 500 mL / NET: -500 mL    PHYSICAL EXAM:  GENERAL: NAD  HEENT: NCAT  CHEST/LUNG: Clear to percussion bilaterally; No rales, rhonchi, wheezing  HEART: Regular rate and rhythm; No murmurs  ABDOMEN: Soft, Nontender, Nondistended; Bowel sounds present  MUSCULOSKELETAL/EXTREMITIES:  Bilateral pedal edema, ACE wrap in place   PSYCH: Appropriate affect  NEURO: Alert & Oriented x 3    I personally reviewed the below data/images/labs:    LABS:                        9.0    6.73  )-----------( 335      ( 19 May 2025 07:20 )             30.4       05-19    143  |  104  |  9   ----------------------------<  89  3.7   |  30  |  1.02    Ca    9.3      19 May 2025 07:20    TPro  7.0  /  Alb  2.2  /  TBili  0.4  /  DBili  x   /  AST  12  /  ALT  12  /  AlkPhos  52  05-19                03-08 Chol -- LDL -- HDL -- Trig 169 mg/dL                  Urinalysis Basic - ( 19 May 2025 07:20 )    Color: x / Appearance: x / SG: x / pH: x  Gluc: 89 mg/dL / Ketone: x  / Bili: x / Urobili: x   Blood: x / Protein: x / Nitrite: x   Leuk Esterase: x / RBC: x / WBC x   Sq Epi: x / Non Sq Epi: x / Bacteria: x        COVID-19 PCR: NotDetec (04-25-25 @ 18:31)      Consultant(s) Notes Reviewed:   Care Discused with Consultants/Other Providers:  Imaging Personally Reviewed:

## 2025-05-22 LAB
ALBUMIN SERPL ELPH-MCNC: 2.2 G/DL — LOW (ref 3.3–5)
ALP SERPL-CCNC: 56 U/L — SIGNIFICANT CHANGE UP (ref 40–120)
ALT FLD-CCNC: 11 U/L — SIGNIFICANT CHANGE UP (ref 10–45)
ANION GAP SERPL CALC-SCNC: 7 MMOL/L — SIGNIFICANT CHANGE UP (ref 5–17)
AST SERPL-CCNC: 11 U/L — SIGNIFICANT CHANGE UP (ref 10–40)
BASOPHILS # BLD AUTO: 0.02 K/UL — SIGNIFICANT CHANGE UP (ref 0–0.2)
BASOPHILS NFR BLD AUTO: 0.2 % — SIGNIFICANT CHANGE UP (ref 0–2)
BILIRUB SERPL-MCNC: 0.5 MG/DL — SIGNIFICANT CHANGE UP (ref 0.2–1.2)
BUN SERPL-MCNC: 11 MG/DL — SIGNIFICANT CHANGE UP (ref 7–23)
CALCIUM SERPL-MCNC: 9.4 MG/DL — SIGNIFICANT CHANGE UP (ref 8.4–10.5)
CHLORIDE SERPL-SCNC: 101 MMOL/L — SIGNIFICANT CHANGE UP (ref 96–108)
CO2 SERPL-SCNC: 30 MMOL/L — SIGNIFICANT CHANGE UP (ref 22–31)
CREAT SERPL-MCNC: 1.06 MG/DL — SIGNIFICANT CHANGE UP (ref 0.5–1.3)
EGFR: 93 ML/MIN/1.73M2 — SIGNIFICANT CHANGE UP
EGFR: 93 ML/MIN/1.73M2 — SIGNIFICANT CHANGE UP
EOSINOPHIL # BLD AUTO: 0.23 K/UL — SIGNIFICANT CHANGE UP (ref 0–0.5)
EOSINOPHIL NFR BLD AUTO: 2.8 % — SIGNIFICANT CHANGE UP (ref 0–6)
GLUCOSE SERPL-MCNC: 97 MG/DL — SIGNIFICANT CHANGE UP (ref 70–99)
HCT VFR BLD CALC: 30.1 % — LOW (ref 39–50)
HGB BLD-MCNC: 8.9 G/DL — LOW (ref 13–17)
IMM GRANULOCYTES NFR BLD AUTO: 0.4 % — SIGNIFICANT CHANGE UP (ref 0–0.9)
LYMPHOCYTES # BLD AUTO: 1.95 K/UL — SIGNIFICANT CHANGE UP (ref 1–3.3)
LYMPHOCYTES # BLD AUTO: 23.3 % — SIGNIFICANT CHANGE UP (ref 13–44)
MCHC RBC-ENTMCNC: 25 PG — LOW (ref 27–34)
MCHC RBC-ENTMCNC: 29.6 G/DL — LOW (ref 32–36)
MCV RBC AUTO: 84.6 FL — SIGNIFICANT CHANGE UP (ref 80–100)
MONOCYTES # BLD AUTO: 1.23 K/UL — HIGH (ref 0–0.9)
MONOCYTES NFR BLD AUTO: 14.7 % — HIGH (ref 2–14)
NEUTROPHILS # BLD AUTO: 4.9 K/UL — SIGNIFICANT CHANGE UP (ref 1.8–7.4)
NEUTROPHILS NFR BLD AUTO: 58.6 % — SIGNIFICANT CHANGE UP (ref 43–77)
NRBC BLD AUTO-RTO: 0 /100 WBCS — SIGNIFICANT CHANGE UP (ref 0–0)
PHOSPHATE SERPL-MCNC: 5.8 MG/DL — HIGH (ref 2.5–4.5)
PLATELET # BLD AUTO: 335 K/UL — SIGNIFICANT CHANGE UP (ref 150–400)
POTASSIUM SERPL-MCNC: 3.6 MMOL/L — SIGNIFICANT CHANGE UP (ref 3.5–5.3)
POTASSIUM SERPL-SCNC: 3.6 MMOL/L — SIGNIFICANT CHANGE UP (ref 3.5–5.3)
PROT SERPL-MCNC: 7.1 G/DL — SIGNIFICANT CHANGE UP (ref 6–8.3)
RBC # BLD: 3.56 M/UL — LOW (ref 4.2–5.8)
RBC # FLD: 18.1 % — HIGH (ref 10.3–14.5)
SODIUM SERPL-SCNC: 138 MMOL/L — SIGNIFICANT CHANGE UP (ref 135–145)
WBC # BLD: 8.36 K/UL — SIGNIFICANT CHANGE UP (ref 3.8–10.5)
WBC # FLD AUTO: 8.36 K/UL — SIGNIFICANT CHANGE UP (ref 3.8–10.5)

## 2025-05-22 PROCEDURE — 99233 SBSQ HOSP IP/OBS HIGH 50: CPT

## 2025-05-22 RX ADMIN — SEVELAMER HYDROCHLORIDE 800 MILLIGRAM(S): 800 TABLET ORAL at 14:25

## 2025-05-22 RX ADMIN — APIXABAN 5 MILLIGRAM(S): 2.5 TABLET, FILM COATED ORAL at 17:21

## 2025-05-22 RX ADMIN — OXYCODONE HYDROCHLORIDE 20 MILLIGRAM(S): 30 TABLET ORAL at 08:57

## 2025-05-22 RX ADMIN — OXYCODONE HYDROCHLORIDE 15 MILLIGRAM(S): 30 TABLET ORAL at 11:32

## 2025-05-22 RX ADMIN — Medication 400 MILLIGRAM(S): at 17:21

## 2025-05-22 RX ADMIN — WHITE PETROLATUM 1 APPLICATION(S): 1 OINTMENT TOPICAL at 06:30

## 2025-05-22 RX ADMIN — OXYCODONE HYDROCHLORIDE 20 MILLIGRAM(S): 30 TABLET ORAL at 22:36

## 2025-05-22 RX ADMIN — OXYCODONE HYDROCHLORIDE 20 MILLIGRAM(S): 30 TABLET ORAL at 21:36

## 2025-05-22 RX ADMIN — Medication 500 MILLIGRAM(S): at 11:36

## 2025-05-22 RX ADMIN — Medication 1 APPLICATION(S): at 06:23

## 2025-05-22 RX ADMIN — Medication 40 MILLIGRAM(S): at 06:20

## 2025-05-22 RX ADMIN — GABAPENTIN 1200 MILLIGRAM(S): 400 CAPSULE ORAL at 21:36

## 2025-05-22 RX ADMIN — SEVELAMER HYDROCHLORIDE 800 MILLIGRAM(S): 800 TABLET ORAL at 17:21

## 2025-05-22 RX ADMIN — OXYCODONE HYDROCHLORIDE 15 MILLIGRAM(S): 30 TABLET ORAL at 17:22

## 2025-05-22 RX ADMIN — Medication 9 MILLIGRAM(S): at 21:37

## 2025-05-22 RX ADMIN — PREGABALIN 25 MILLIGRAM(S): 50 CAPSULE ORAL at 17:21

## 2025-05-22 RX ADMIN — QUETIAPINE FUMARATE 25 MILLIGRAM(S): 25 TABLET ORAL at 21:36

## 2025-05-22 RX ADMIN — Medication 1 TABLET(S): at 11:34

## 2025-05-22 RX ADMIN — GABAPENTIN 1200 MILLIGRAM(S): 400 CAPSULE ORAL at 14:26

## 2025-05-22 RX ADMIN — Medication 400 MILLIGRAM(S): at 14:25

## 2025-05-22 RX ADMIN — Medication 220 MILLIGRAM(S): at 11:35

## 2025-05-22 RX ADMIN — PREGABALIN 25 MILLIGRAM(S): 50 CAPSULE ORAL at 06:20

## 2025-05-22 RX ADMIN — TAMSULOSIN HYDROCHLORIDE 0.8 MILLIGRAM(S): 0.4 CAPSULE ORAL at 21:36

## 2025-05-22 RX ADMIN — Medication 400 MILLIGRAM(S): at 08:57

## 2025-05-22 RX ADMIN — NALOXEGOL OXALATE 25 MILLIGRAM(S): 12.5 TABLET, FILM COATED ORAL at 11:35

## 2025-05-22 RX ADMIN — APIXABAN 5 MILLIGRAM(S): 2.5 TABLET, FILM COATED ORAL at 06:19

## 2025-05-22 RX ADMIN — SODIUM HYPOCHLORITE 1 APPLICATION(S): 0.12 SOLUTION TOPICAL at 06:26

## 2025-05-22 RX ADMIN — POLYETHYLENE GLYCOL 3350 17 GRAM(S): 17 POWDER, FOR SOLUTION ORAL at 06:28

## 2025-05-22 RX ADMIN — Medication 1 APPLICATION(S): at 23:54

## 2025-05-22 RX ADMIN — OXYCODONE HYDROCHLORIDE 20 MILLIGRAM(S): 30 TABLET ORAL at 15:07

## 2025-05-22 RX ADMIN — SEVELAMER HYDROCHLORIDE 800 MILLIGRAM(S): 800 TABLET ORAL at 08:58

## 2025-05-22 RX ADMIN — Medication 1 APPLICATION(S): at 06:22

## 2025-05-22 RX ADMIN — AMLODIPINE BESYLATE 10 MILLIGRAM(S): 10 TABLET ORAL at 06:19

## 2025-05-22 RX ADMIN — MUPIROCIN CALCIUM 1 APPLICATION(S): 20 CREAM TOPICAL at 06:25

## 2025-05-22 RX ADMIN — GABAPENTIN 1200 MILLIGRAM(S): 400 CAPSULE ORAL at 06:20

## 2025-05-22 RX ADMIN — DULOXETINE 60 MILLIGRAM(S): 20 CAPSULE, DELAYED RELEASE ORAL at 11:35

## 2025-05-22 RX ADMIN — Medication 1 APPLICATION(S): at 06:28

## 2025-05-22 NOTE — PROGRESS NOTE ADULT - ASSESSMENT
39 year old male with PMH of morbid obesity, BEA on CPAP, pAFIB (not on AC), HTN, and BL papilledema attributed to pseudotumor cerebri; who initially presented to  on 2/24 with SOB and BL LE edema. Found to have URI with progressive SOB and multifocal PNA on imaging. His hospital course was complicated by worsening respiratory distress and increased 02 demands secondary to secretions (requiring multiple intubation attempts) and eventual MICU admission. While in MICU, he 02 requirements continued despite optimal vent management. Concern noted for progressive ARDS, unable to prone given morbid obesity, and ultimately cannulated for vvECMO on 2/25 and transferred to [facility] for further management on 2/26.  His hospital course at [facility] was significant for the following: diuretic and ABX management, s/p trache and PEG (placed on 3/7- PEG since removed), RCU admission, high grade fevers (secondary to panniculitis), progressively worsening sacral ulcer (likely osteomyelitis- s/p surgical debridement), BL foot wound (secondary to pressor use), neuropathy (secondary to critical illness polyneuropathy), ELLA (secondary to vancomycin toxicity and overdiuresis- since DCd- with improvement). Patient now admitted for a multidisciplinary rehab program. 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  (present on admission)   - Gait Instability, ADL impairments and Functional impairments: start Comprehensive Rehab Program of PT/OT  - 3 hours a day, 5 days a week   - PRN: Nebulizer QID     #BEA on BIPAP  --via nasal canula qhs    #Sacral Osteomyelitis  - Zosyn TID - completed on 5/12/25    #Paroxysmal AFIB  - Eliquis 5mg BID     #Calf Tenderness  - BL LE doppler negative 5/21     #L hip pain - resolved resolved   - no acute findings on XR or CT   - L hip MRI (5/7) no significant findings     #HTN  - Amlodipine 10mg dialy     #Sleep/Mood  - Melatonin 9mg at HS   - Quetiapine 25mg at HS     #Skin  Wound Recs per Plastic Surgery (5/16):  Sacral Pressure Injury: BID- Cleanse with NS, remove excess fluid, apply santyl ointment to sloughing tissues, pack wound with 1-inch dry plain packing, cover with 4 inch gauze and abd combine  - R buttock: BID- Cleanse with NS, overlay calcium alginate, ABD combine dressing  Additional wound care instructions:  -->Right anterior thigh to groin isolated wound: Clean wound and periwound skin with NS. Pat dry. Apply liquid barrier film to periwound skin, allow to dry. Cover with silicone foam with border. Change daily or PRN if soiled   --> Bilateral abdominal pannus, bilateral groin: Cleanse with luke-warm soap and water, dry well. Apply clotrimazole  --> Bilateral feet: Apply betadine  to bilateral foot wounds followed by 4x4 gauze, ABD pad, and matilde daily per podiatry.  --> Continue to offload pressure; bariatric low airloss support surface, turn and position per protocol with use of fluidized positioning devices, continue incontinence and moisture care per protocol and use of single breathable incontinence pads, continue to offload heels with fluidized positioning devices. Continue use of bariatric seat cushion, limit sit time- no more than 2 consecutive hours at any given time.  - Pressure injury/Skin: OOB to Chair, PT/OT    - Ammonium lactate to BL LEs     #Neuropathy  - Cymbalta 60mg daily   - Gabapentin 1200mg TID     #Pain Mgmt   - Capsaicin cream to BL feet QID - Avoid use on damaged, broken, or irritated skin. Avoid occlusive dressing or heat   - Lidocaine patch to BL thighs   - PRN:; Oxycodone and Tylenol 650mg QID     #Morbid obesity  - Most recent weight 394lbs (5/7)     #GI/Bowel Mgmt   - Pantoprazole  - Bisacodyl 10mg at HS   - Miralax   - Naloxegal 25mg daily     #/Bladder Mgmt   #ELLA  #Urinary Hesitancy  - USKB 5/20 negative   - Renvela 800mg TID  - Cr improving   - Bladder scan TID  - Flomax 0.8mg at HS     #FEN --Morbid obesity (BMI > 40).  - Diet - Regular + Thins  [CCHO]    - MVI     # Health Management:   Environmental challenge affecting dc plan--narrow door and need for ramp to house entrance  However, patient making goal directed effort towards therapy goals     #Precautions / PROPHYLAXIS:   - Falls  - ortho: Weight bearing status: WBAT in surgical shoe   - Lungs: Aspiration, Incentive Spirometer   - DVT: Lovenox  ----------------------------------------------  IDT 5/13  Stand pivot T/F during shower max x 2 persons  Urinary hesitancy noted  Pain b/l feet over areas with skin break on dorsum of foot, limiting wt bearing  Most trasfers still  Sit to stand mod assist x 1 T/f bed to  Bussy board mod assist x 1  Took 4 steps on parallel bars with mo assist x 2  Making progress towards therapy goals  Barrier--ulcers on feet, limiting wt bearing, urinary hesitancy, environmental barrier at home (stairs), severe obesity  LE weakness  Overall making functional progress, has supportive family,  hence decision to extend DC date and achieve same home dc   Ext 5/30 revised to 6/6  (barriers as noted, making functional progress)     ----------------------------------------------  Dr. CANTU's Liaison with Family/Providers:      OUTPATIENT/FOLLOW UP:    Your Primary Care Provider,   Phone: (   )    -  Fax: (   )    -  Follow Up Time: 1 week    [facility] [place],   [address] [place] [address]  Phone: [phone]  Fax: (   )    -  Follow Up Time:    Dr. Waterhouse  Podiatry  [phone]  Within 1 week       39 year old male with PMH of morbid obesity, BEA on CPAP, pAFIB (not on AC prior to admission), HTN, and BL papilledema attributed to pseudotumor cerebri; who initially presented to  on 2/24 with SOB and BL LE edema. Found to have URI with progressive SOB and multifocal PNA on imaging. His hospital course was complicated by worsening respiratory distress and increased 02 demands secondary to secretions (requiring multiple intubation attempts) and eventual MICU admission. While in MICU, he 02 requirements continued despite optimal vent management. Concern noted for progressive ARDS, unable to prone given morbid obesity, and ultimately cannulated for vvECMO on 2/25 and transferred to [facility] for further management on 2/26.  His hospital course at [facility] was significant for the following: diuretic and ABX management, s/p trache and PEG (placed on 3/7- PEG since removed), RCU admission, high grade fevers (secondary to panniculitis), progressively worsening sacral ulcer (likely osteomyelitis- s/p surgical debridement), BL foot wound (secondary to pressor use), neuropathy (secondary to critical illness polyneuropathy), ELLA (secondary to vancomycin toxicity and overdiuresis- since DCd- with improvement). Patient now admitted for a multidisciplinary rehab program. 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  (present on admission)   - Gait Instability, ADL impairments and Functional impairments: start Comprehensive Rehab Program of PT/OT  - 3 hours a day, 5 days a week   - PRN: Nebulizer QID     #BEA on BIPAP  --via nasal canula qhs    #Sacral Osteomyelitis  - Zosyn TID - completed on 5/12/25    #Paroxysmal AFIB  - Eliquis 5mg BID     #Calf Tenderness  - BL LE doppler negative 5/21     #L hip pain - resolved resolved   - no acute findings on XR or CT   - L hip MRI (5/7) no significant findings     #HTN  - Amlodipine 10mg dialy     #Sleep/Mood  - Melatonin 9mg at HS   - Quetiapine 25mg at HS     #Skin  Wound Recs per Plastic Surgery (5/16):  Sacral Pressure Injury: BID- Cleanse with NS, remove excess fluid, apply santyl ointment to sloughing tissues, pack wound with 1-inch dry plain packing, cover with 4 inch gauze and abd combine  - R buttock: BID- Cleanse with NS, overlay calcium alginate, ABD combine dressing  Additional wound care instructions:  -->Right anterior thigh to groin isolated wound: Clean wound and periwound skin with NS. Pat dry. Apply liquid barrier film to periwound skin, allow to dry. Cover with silicone foam with border. Change daily or PRN if soiled   --> Bilateral abdominal pannus, bilateral groin: Cleanse with luke-warm soap and water, dry well. Apply clotrimazole  --> Bilateral feet: Apply betadine  to bilateral foot wounds followed by 4x4 gauze, ABD pad, and matilde daily per podiatry.  --> Continue to offload pressure; bariatric low airloss support surface, turn and position per protocol with use of fluidized positioning devices, continue incontinence and moisture care per protocol and use of single breathable incontinence pads, continue to offload heels with fluidized positioning devices. Continue use of bariatric seat cushion, limit sit time- no more than 2 consecutive hours at any given time.  - Pressure injury/Skin: OOB to Chair, PT/OT    - Ammonium lactate to BL LEs     #Neuropathy  - Cymbalta 60mg daily   - Gabapentin 1200mg TID     #Pain Mgmt   - Capsaicin cream to BL feet QID - Avoid use on damaged, broken, or irritated skin. Avoid occlusive dressing or heat   - Lidocaine patch to BL thighs   - PRN:; Oxycodone and Tylenol 650mg QID     #Morbid obesity  - Most recent weight 394lbs (5/7)     #GI/Bowel Mgmt   - Pantoprazole  - Bisacodyl 10mg at HS   - Miralax   - Naloxegal 25mg daily     #/Bladder Mgmt   #ELLA  #Urinary Hesitancy  - USKB 5/20 negative   - Renvela 800mg TID  - Cr improving   - Bladder scan TID  - Flomax 0.8mg at HS     #FEN --Morbid obesity (BMI > 40).  - Diet - Regular + Thins  [CCHO]    - MVI     # Health Management:   Environmental challenge affecting dc plan--narrow door and need for ramp to house entrance  However, patient making goal directed effort towards therapy goals     #Precautions / PROPHYLAXIS:   - Falls  - ortho: Weight bearing status: WBAT in surgical shoe   - Lungs: Aspiration, Incentive Spirometer   - DVT: Lovenox  ----------------------------------------------  IDT 5/13  Stand pivot T/F during shower max x 2 persons  Urinary hesitancy noted  Pain b/l feet over areas with skin break on dorsum of foot, limiting wt bearing  Most transfers still  Sit to stand mod assist x 1 T/f bed to  Bussy board mod assist x 1  Took 4 steps on parallel bars with mo assist x 2  Making progress towards therapy goals  Barrier--ulcers on feet, limiting wt bearing, urinary hesitancy, environmental barrier at home (stairs), severe obesity  LE weakness  Overall making functional progress, has supportive family,  hence decision to extend DC date and achieve same home dc   Ext 5/30 revised to 6/6  (barriers as noted, making functional progress)     ----------------------------------------------  Dr. CANTU's Liaison with Family/Providers:  5/22--Patient's partner at bedside, gets regular updates on patient's clinical and functional progress  5/2--D/w orthotist Mr Dawn, consult for pressure offloading shoes both legs     OUTPATIENT/FOLLOW UP:    Your Primary Care Provider,   Phone: (   )    -  Fax: (   )    -  Follow Up Time: 1 week    [facility] [place],   [address] [place] [address]  Phone: [phone]  Fax: (   )    -  Follow Up Time:    Dr. Waterhouse  Podiatry  [phone]  Within 1 week

## 2025-05-22 NOTE — PROGRESS NOTE ADULT - SUBJECTIVE AND OBJECTIVE BOX
Subjective  Patient seen and examined this AM.   Admits to sleeping well.   Experiencing sole of foot pain over the areas with skin break--distal sole of foot.   Awaiting offloading shoes from therapy vs Orthotic.   Discussed family to purchase donut cushion to encourage out of bed.     ROS--no acute pain, no fever or chest pain   B/l leg and sacral ulcers  LBM 5/20    Function  -Ambulated 3x10' in // bars min Ax2 on each side to support trunk/pelvis  extension to maintain upright posture and close wheelchair follow for safety.  Modified 3 point step to gait leading with Right LE. Decreased clearance/step  length noted landing in foot flat positioning.  Distances limited by pain and  fatigue requiring extended seated rest breaks.  Pt reports pain in his b/l LEs but is able to ambulate with increased rest  breaks.      Vital Signs Last 24 Hrs  T(C): 37.4 (21 May 2025 21:33), Max: 37.4 (21 May 2025 21:33)  T(F): 99.4 (21 May 2025 21:33), Max: 99.4 (21 May 2025 21:33)  HR: 106 (22 May 2025 06:18) (98 - 106)  BP: 116/69 (22 May 2025 06:18) (113/69 - 116/69)  BP(mean): --  RR: 16 (22 May 2025 06:18) (16 - 16)  SpO2: 97% (22 May 2025 06:18) (94% - 97%)    EXAM  Gen - Comfortable,   HEENT - NAD  Neck - Supple, No limited ROM  Pulm - Clear  Cardiovascular - RRR, S1S2, No murmurs  Abdomen - Soft, non tender, +BS  Extremities - chronic skin changes with hyperpigmentation at shin      Neuro-  AAO X 3, Speech is normal     Motor - UE 5/5                    LEFT    LE - HF 3/5, KE 3/5 ankle 2/5                    RIGHT LE - HF 3/5, KE 3/5 ankle 2/5      Sensory - Decreased to light touch bilateral lower extremities      Coordination - impaired lower extremities   Psychiatric - Mood stable, Affect WNL    Skin:  R foot plantar aspect lateral/distal side 7cm x 5.5cm ischemic wound from pressors  RLE 4th digit 1x1.5cm ischemic induced wound from pressors  RLE 5th digit 3x2cm ischemic induced wound from pressors  L foot plantar aspect lateral/distal side 6x7cm ischemic induced wound from pressor usage  LLE 4th digit 2x1cm ischemic induced wound from pressors  LLE 5th digit 2.5x3cm ischemic induced wound from pressors   R groin skin tear 1x1.5cm  Unstageable pressure wound cocyx 7cmx4.5cm w/ 2.5cm depth  R buttock pressure induced wound semi contiguous with coccyx wound calling unstageable given prior debridement 8.5x5.5cm  L buttock 1.0x0.5cm stage 3 pressure injury  R buttock skin tear 3.5cm x 0.5cm and 1.5cmx0.5cm  Dry skin with callus on sole of foot     MMS--antigravity upper extremities    LABS:                        8.9    8.36  )-----------( 335      ( 22 May 2025 06:59 )             30.1     05-22    138  |  101  |  11  ----------------------------<  97  3.6   |  30  |  1.06    Ca    9.4      22 May 2025 06:59  Phos  5.8     05-22    TPro  7.1  /  Alb  2.2[L]  /  TBili  0.5  /  DBili  x   /  AST  11  /  ALT  11  /  AlkPhos  56  05-22      Urinalysis Basic - ( 22 May 2025 06:59 )    Color: x / Appearance: x / SG: x / pH: x  Gluc: 97 mg/dL / Ketone: x  / Bili: x / Urobili: x   Blood: x / Protein: x / Nitrite: x   Leuk Esterase: x / RBC: x / WBC x   Sq Epi: x / Non Sq Epi: x / Bacteria: x        MEDICATIONS  (STANDING):  amLODIPine   Tablet 10 milliGRAM(s) Oral daily  ammonium lactate 12% Lotion 1 Application(s) Topical two times a day  apixaban 5 milliGRAM(s) Oral two times a day  ascorbic acid 500 milliGRAM(s) Oral daily  bisacodyl 10 milliGRAM(s) Oral at bedtime  capsaicin 0.025% Cream 1 Application(s) Topical four times a day  clotrimazole 1% Cream 1 Application(s) Topical <User Schedule>  collagenase Ointment 1 Application(s) Topical two times a day  Dakins Solution - 1/4 Strength 1 Application(s) Topical two times a day  dextrose 5%. 1000 milliLiter(s) (100 mL/Hr) IV Continuous <Continuous>  dextrose 5%. 1000 milliLiter(s) (50 mL/Hr) IV Continuous <Continuous>  dextrose 50% Injectable 25 Gram(s) IV Push once  dextrose 50% Injectable 12.5 Gram(s) IV Push once  dextrose 50% Injectable 25 Gram(s) IV Push once  DULoxetine 60 milliGRAM(s) Oral daily  gabapentin 1200 milliGRAM(s) Oral every 8 hours  glucagon  Injectable 1 milliGRAM(s) IntraMuscular once  influenza   Vaccine 0.5 milliLiter(s) IntraMuscular once  lidocaine   4% Patch 1 Patch Transdermal daily  lidocaine   4% Patch 1 Patch Transdermal daily  magnesium oxide 400 milliGRAM(s) Oral three times a day with meals  melatonin 9 milliGRAM(s) Oral at bedtime  multivitamin 1 Tablet(s) Oral daily  mupirocin 2% Ointment 1 Application(s) Topical two times a day  naloxegol 25 milliGRAM(s) Oral daily  pantoprazole    Tablet 40 milliGRAM(s) Oral before breakfast  petrolatum white Ointment 1 Application(s) Topical every 8 hours  polyethylene glycol 3350 17 Gram(s) Oral two times a day  povidone iodine 10% Solution 1 Application(s) Topical daily  pregabalin 25 milliGRAM(s) Oral two times a day  QUEtiapine 25 milliGRAM(s) Oral at bedtime  sevelamer carbonate 800 milliGRAM(s) Oral three times a day with meals  silver nitrate Applicator 6 Application(s) Topical once  tamsulosin 0.8 milliGRAM(s) Oral at bedtime  zinc sulfate 220 milliGRAM(s) Oral daily    MEDICATIONS  (PRN):  albuterol/ipratropium for Nebulization 3 milliLiter(s) Nebulizer every 6 hours PRN Shortness of Breath and/or Wheezing  dextrose Oral Gel 15 Gram(s) Oral once PRN Blood Glucose LESS THAN 70 milliGRAM(s)/deciliter  oxyCODONE    IR 20 milliGRAM(s) Oral every 6 hours PRN Severe Pain (7 - 10)  oxyCODONE    IR 15 milliGRAM(s) Oral two times a day PRN Moderate Pain (4 - 6)  oxyCODONE    IR 10 milliGRAM(s) Oral two times a day PRN Mild Pain (1 - 3)       Subjective  Patient seen and examined this AM. Partner, present and engaged during review   Admits to sleeping well. No pain at rest  Experiencing sole of foot pain over the areas with skin break--distal sole of foot.   Awaiting offloading shoes from therapy vs Orthotic.   Discussed family to purchase donut cushion to encourage out of bed. and offload pressure on sacral wound, as we do not have this device      ROS--no acute pain, no fever or chest pain   B/l leg and sacral ulcers  LBM 5/20    Function  Ambulated 25', 15', 8' with bariatric RW needing MIN/MOD A x 2 and wheelchair  follow. Continues to use modified step to 3 point gait pattern (leading with  Right LE) and excessive offloading with UEs on device due to bilateral foot  pain.  -Distances limited by overall fatigue, UE fatigue, and worsening pain to feet  -Extended rest breaks provided to allow adequate recovery time between walking  trials.    Therapeutic Activity  -Sit to/from stand with bariatric RW needing MOD A x 2. Verbal cuing for proper  foot placement under wheelchair prior to standing attempt and anterior  rocking/weight shifting. Multiple sit to stands performed throughout gait  training.  -Patient returned to bed at end of session with use of overhead [employer] system    Vital Signs Last 24 Hrs  T(C): 37.4 (21 May 2025 21:33), Max: 37.4 (21 May 2025 21:33)  T(F): 99.4 (21 May 2025 21:33), Max: 99.4 (21 May 2025 21:33)  HR: 106 (22 May 2025 06:18) (98 - 106)  BP: 116/69 (22 May 2025 06:18) (113/69 - 116/69)  RR: 16 (22 May 2025 06:18) (16 - 16)  SpO2: 97% (22 May 2025 06:18) (94% - 97%)    EXAM  Gen - Comfortable,   HEENT - NAD  Neck - Supple, No limited ROM  Pulm - Clear  Cardiovascular - RRR, S1S2, No murmurs  Abdomen - Soft, non tender, +BS  Extremities - chronic skin changes with hyperpigmentation at shin      Neuro-  AAO X 3, Speech is normal     Motor - UE 5/5                    LEFT    LE - HF 3/5, KE 3/5 ankle 2/5                    RIGHT LE - HF 3/5, KE 3/5 ankle 2/5      Sensory - Decreased to light touch bilateral lower extremities      Coordination - impaired lower extremities   Psychiatric - Mood stable, Affect WNL    Skin:  R foot plantar aspect lateral/distal side 7cm x 5.5cm ischemic wound from pressors  RLE 4th digit 1x1.5cm ischemic induced wound from pressors  RLE 5th digit 3x2cm ischemic induced wound from pressors  L foot plantar aspect lateral/distal side 6x7cm ischemic induced wound from pressor usage  LLE 4th digit 2x1cm ischemic induced wound from pressors  LLE 5th digit 2.5x3cm ischemic induced wound from pressors   R groin skin tear 1x1.5cm  Unstageable pressure wound cocyx 7cmx4.5cm w/ 2.5cm depth  R buttock pressure induced wound semi contiguous with coccyx wound calling unstageable given prior debridement 8.5x5.5cm  L buttock 1.0x0.5cm stage 3 pressure injury  R buttock skin tear 3.5cm x 0.5cm and 1.5cmx0.5cm  Dry skin with callus on sole of foot     MMS--antigravity upper extremities    LABS:                        8.9    8.36  )-----------( 335      ( 22 May 2025 06:59 )             30.1     05-22    138  |  101  |  11  ----------------------------<  97  3.6   |  30  |  1.06    Ca    9.4      22 May 2025 06:59  Phos  5.8     05-22    TPro  7.1  /  Alb  2.2[L]  /  TBili  0.5  /  DBili  x   /  AST  11  /  ALT  11  /  AlkPhos  56  05-22      Urinalysis Basic - ( 22 May 2025 06:59 )    Color: x / Appearance: x / SG: x / pH: x  Gluc: 97 mg/dL / Ketone: x  / Bili: x / Urobili: x   Blood: x / Protein: x / Nitrite: x   Leuk Esterase: x / RBC: x / WBC x   Sq Epi: x / Non Sq Epi: x / Bacteria: x        MEDICATIONS  (STANDING):  amLODIPine   Tablet 10 milliGRAM(s) Oral daily  ammonium lactate 12% Lotion 1 Application(s) Topical two times a day  apixaban 5 milliGRAM(s) Oral two times a day  ascorbic acid 500 milliGRAM(s) Oral daily  bisacodyl 10 milliGRAM(s) Oral at bedtime  capsaicin 0.025% Cream 1 Application(s) Topical four times a day  clotrimazole 1% Cream 1 Application(s) Topical <User Schedule>  collagenase Ointment 1 Application(s) Topical two times a day  Dakins Solution - 1/4 Strength 1 Application(s) Topical two times a day  dextrose 5%. 1000 milliLiter(s) (100 mL/Hr) IV Continuous <Continuous>  dextrose 5%. 1000 milliLiter(s) (50 mL/Hr) IV Continuous <Continuous>  dextrose 50% Injectable 25 Gram(s) IV Push once  dextrose 50% Injectable 12.5 Gram(s) IV Push once  dextrose 50% Injectable 25 Gram(s) IV Push once  DULoxetine 60 milliGRAM(s) Oral daily  gabapentin 1200 milliGRAM(s) Oral every 8 hours  glucagon  Injectable 1 milliGRAM(s) IntraMuscular once  influenza   Vaccine 0.5 milliLiter(s) IntraMuscular once  lidocaine   4% Patch 1 Patch Transdermal daily  lidocaine   4% Patch 1 Patch Transdermal daily  magnesium oxide 400 milliGRAM(s) Oral three times a day with meals  melatonin 9 milliGRAM(s) Oral at bedtime  multivitamin 1 Tablet(s) Oral daily  mupirocin 2% Ointment 1 Application(s) Topical two times a day  naloxegol 25 milliGRAM(s) Oral daily  pantoprazole    Tablet 40 milliGRAM(s) Oral before breakfast  petrolatum white Ointment 1 Application(s) Topical every 8 hours  polyethylene glycol 3350 17 Gram(s) Oral two times a day  povidone iodine 10% Solution 1 Application(s) Topical daily  pregabalin 25 milliGRAM(s) Oral two times a day  QUEtiapine 25 milliGRAM(s) Oral at bedtime  sevelamer carbonate 800 milliGRAM(s) Oral three times a day with meals  silver nitrate Applicator 6 Application(s) Topical once  tamsulosin 0.8 milliGRAM(s) Oral at bedtime  zinc sulfate 220 milliGRAM(s) Oral daily    MEDICATIONS  (PRN):  albuterol/ipratropium for Nebulization 3 milliLiter(s) Nebulizer every 6 hours PRN Shortness of Breath and/or Wheezing  dextrose Oral Gel 15 Gram(s) Oral once PRN Blood Glucose LESS THAN 70 milliGRAM(s)/deciliter  oxyCODONE    IR 20 milliGRAM(s) Oral every 6 hours PRN Severe Pain (7 - 10)  oxyCODONE    IR 15 milliGRAM(s) Oral two times a day PRN Moderate Pain (4 - 6)  oxyCODONE    IR 10 milliGRAM(s) Oral two times a day PRN Mild Pain (1 - 3)    < from: US Duplex Venous Lower Ext Complete, Bilateral (05.21.25 @ 19:48) >  PROCEDURE DATE:  05/21/2025          INTERPRETATION:  CLINICAL INFORMATION: Calf tenderness    COMPARISON: 4/29/2025.    TECHNIQUE: Duplex sonography of the BILATERAL LOWER extremity veins with   color and spectral Doppler, with and without compression.    FINDINGS:  Technically difficult study due to patient's body habitus and limited   mobility    RIGHT:  Normal compressibility of the RIGHT common femoral, femoral and popliteal   veins.  Doppler examination shows normal spontaneous and phasic flow.  No RIGHT calf vein thrombosis is detected. Right peroneal vein not   visualized    LEFT:  Normal compressibility of the LEFT common femoral, femoral and popliteal   veins.  Doppler examination shows normal spontaneous and phasic flow.  No LEFT calf vein thrombosis is detected. Left peroneal vein not   visualized.    IMPRESSION:      Technically difficult study. No evidence of DVT bilateral lower   extremities as visualized.

## 2025-05-23 PROCEDURE — 99232 SBSQ HOSP IP/OBS MODERATE 35: CPT

## 2025-05-23 RX ORDER — OXYCODONE HYDROCHLORIDE 30 MG/1
20 TABLET ORAL EVERY 6 HOURS
Refills: 0 | Status: DISCONTINUED | OUTPATIENT
Start: 2025-05-23 | End: 2025-05-30

## 2025-05-23 RX ORDER — OXYCODONE HYDROCHLORIDE 30 MG/1
15 TABLET ORAL
Refills: 0 | Status: DISCONTINUED | OUTPATIENT
Start: 2025-05-23 | End: 2025-05-30

## 2025-05-23 RX ORDER — OXYCODONE HYDROCHLORIDE 30 MG/1
10 TABLET ORAL
Refills: 0 | Status: DISCONTINUED | OUTPATIENT
Start: 2025-05-23 | End: 2025-05-30

## 2025-05-23 RX ADMIN — Medication 400 MILLIGRAM(S): at 13:38

## 2025-05-23 RX ADMIN — SEVELAMER HYDROCHLORIDE 800 MILLIGRAM(S): 800 TABLET ORAL at 13:38

## 2025-05-23 RX ADMIN — Medication 40 MILLIGRAM(S): at 06:36

## 2025-05-23 RX ADMIN — Medication 400 MILLIGRAM(S): at 09:25

## 2025-05-23 RX ADMIN — Medication 400 MILLIGRAM(S): at 17:56

## 2025-05-23 RX ADMIN — OXYCODONE HYDROCHLORIDE 20 MILLIGRAM(S): 30 TABLET ORAL at 23:21

## 2025-05-23 RX ADMIN — Medication 500 MILLIGRAM(S): at 13:39

## 2025-05-23 RX ADMIN — Medication 1 TABLET(S): at 13:40

## 2025-05-23 RX ADMIN — GABAPENTIN 1200 MILLIGRAM(S): 400 CAPSULE ORAL at 06:36

## 2025-05-23 RX ADMIN — PREGABALIN 25 MILLIGRAM(S): 50 CAPSULE ORAL at 17:56

## 2025-05-23 RX ADMIN — QUETIAPINE FUMARATE 25 MILLIGRAM(S): 25 TABLET ORAL at 21:17

## 2025-05-23 RX ADMIN — SEVELAMER HYDROCHLORIDE 800 MILLIGRAM(S): 800 TABLET ORAL at 09:25

## 2025-05-23 RX ADMIN — Medication 1 APPLICATION(S): at 06:38

## 2025-05-23 RX ADMIN — Medication 9 MILLIGRAM(S): at 21:17

## 2025-05-23 RX ADMIN — NALOXEGOL OXALATE 25 MILLIGRAM(S): 12.5 TABLET, FILM COATED ORAL at 13:39

## 2025-05-23 RX ADMIN — APIXABAN 5 MILLIGRAM(S): 2.5 TABLET, FILM COATED ORAL at 17:56

## 2025-05-23 RX ADMIN — GABAPENTIN 1200 MILLIGRAM(S): 400 CAPSULE ORAL at 13:38

## 2025-05-23 RX ADMIN — PREGABALIN 25 MILLIGRAM(S): 50 CAPSULE ORAL at 06:36

## 2025-05-23 RX ADMIN — AMLODIPINE BESYLATE 10 MILLIGRAM(S): 10 TABLET ORAL at 06:36

## 2025-05-23 RX ADMIN — Medication 1 APPLICATION(S): at 06:36

## 2025-05-23 RX ADMIN — OXYCODONE HYDROCHLORIDE 20 MILLIGRAM(S): 30 TABLET ORAL at 09:25

## 2025-05-23 RX ADMIN — DULOXETINE 60 MILLIGRAM(S): 20 CAPSULE, DELAYED RELEASE ORAL at 13:39

## 2025-05-23 RX ADMIN — SEVELAMER HYDROCHLORIDE 800 MILLIGRAM(S): 800 TABLET ORAL at 17:56

## 2025-05-23 RX ADMIN — OXYCODONE HYDROCHLORIDE 15 MILLIGRAM(S): 30 TABLET ORAL at 17:55

## 2025-05-23 RX ADMIN — Medication 1 APPLICATION(S): at 23:21

## 2025-05-23 RX ADMIN — APIXABAN 5 MILLIGRAM(S): 2.5 TABLET, FILM COATED ORAL at 06:38

## 2025-05-23 RX ADMIN — Medication 10 MILLIGRAM(S): at 21:16

## 2025-05-23 RX ADMIN — GABAPENTIN 1200 MILLIGRAM(S): 400 CAPSULE ORAL at 21:17

## 2025-05-23 RX ADMIN — Medication 220 MILLIGRAM(S): at 13:39

## 2025-05-23 RX ADMIN — OXYCODONE HYDROCHLORIDE 15 MILLIGRAM(S): 30 TABLET ORAL at 13:38

## 2025-05-23 RX ADMIN — TAMSULOSIN HYDROCHLORIDE 0.8 MILLIGRAM(S): 0.4 CAPSULE ORAL at 21:16

## 2025-05-23 NOTE — PROGRESS NOTE ADULT - ASSESSMENT
39 year old male with PMH of morbid obesity, BEA on CPAP, pAFIB (not on AC prior to admission), HTN, and BL papilledema attributed to pseudotumor cerebri; who initially presented to  on 2/24 with SOB and BL LE edema. Found to have URI with progressive SOB and multifocal PNA on imaging. His hospital course was complicated by worsening respiratory distress and increased 02 demands secondary to secretions (requiring multiple intubation attempts) and eventual MICU admission. While in MICU, he 02 requirements continued despite optimal vent management. Concern noted for progressive ARDS, unable to prone given morbid obesity, and ultimately cannulated for vvECMO on 2/25 and transferred to [facility] for further management on 2/26.  His hospital course at [facility] was significant for the following: diuretic and ABX management, s/p trache and PEG (placed on 3/7- PEG since removed), RCU admission, high grade fevers (secondary to panniculitis), progressively worsening sacral ulcer (likely osteomyelitis- s/p surgical debridement), BL foot wound (secondary to pressor use), neuropathy (secondary to critical illness polyneuropathy), ELLA (secondary to vancomycin toxicity and overdiuresis- since DCd- with improvement). Patient now admitted for a multidisciplinary rehab program. 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  (present on admission)   - Gait Instability, ADL impairments and Functional impairments: start Comprehensive Rehab Program of PT/OT  - 3 hours a day, 5 days a week   - PRN: Nebulizer QID     #BEA on BIPAP  --via nasal canula qhs    #Sacral Osteomyelitis  - Zosyn TID - completed on 5/12/25    #Paroxysmal AFIB  - Eliquis 5mg BID     #Calf Tenderness  - BL LE doppler negative 5/21     #L hip pain - resolved resolved   - no acute findings on XR or CT   - L hip MRI (5/7) no significant findings     #HTN  - Amlodipine 10mg dialy     #Sleep/Mood  - Melatonin 9mg at HS   - Quetiapine 25mg at HS     #Skin  Wound Recs per Plastic Surgery (5/16):  Sacral Pressure Injury: BID- Cleanse with NS, remove excess fluid, apply santyl ointment to sloughing tissues, pack wound with 1-inch dry plain packing, cover with 4 inch gauze and abd combine  - R buttock: BID- Cleanse with NS, overlay calcium alginate, ABD combine dressing  Additional wound care instructions:  -->Right anterior thigh to groin isolated wound: Clean wound and periwound skin with NS. Pat dry. Apply liquid barrier film to periwound skin, allow to dry. Cover with silicone foam with border. Change daily or PRN if soiled   --> Bilateral abdominal pannus, bilateral groin: Cleanse with luke-warm soap and water, dry well. Apply clotrimazole  --> Bilateral feet: Apply betadine  to bilateral foot wounds followed by 4x4 gauze, ABD pad, and matilde daily per podiatry.  --> Continue to offload pressure; bariatric low airloss support surface, turn and position per protocol with use of fluidized positioning devices, continue incontinence and moisture care per protocol and use of single breathable incontinence pads, continue to offload heels with fluidized positioning devices. Continue use of bariatric seat cushion, limit sit time- no more than 2 consecutive hours at any given time.  - Pressure injury/Skin: OOB to Chair, PT/OT    - Ammonium lactate to BL LEs     #Neuropathy  - Cymbalta 60mg daily   - Gabapentin 1200mg TID     #Pain Mgmt   - Capsaicin cream to BL feet QID - Avoid use on damaged, broken, or irritated skin. Avoid occlusive dressing or heat   - Lidocaine patch to BL thighs   - PRN:; Oxycodone and Tylenol 650mg QID     #Morbid obesity  - Most recent weight 394lbs (5/7)     #GI/Bowel Mgmt   - Pantoprazole  - Bisacodyl 10mg at HS   - Miralax   - Naloxegal 25mg daily     #/Bladder Mgmt   #ELLA  #Urinary Hesitancy  - USKB 5/20 negative   - Renvela 800mg TID  - Cr improving   - Bladder scan TID  - Flomax 0.8mg at HS     #FEN --Morbid obesity (BMI > 40).  - Diet - Regular + Thins  [CCHO]    - MVI     # Health Management:   Environmental challenge affecting dc plan--narrow door and need for ramp to house entrance  However, patient making goal directed effort towards therapy goals     #Precautions / PROPHYLAXIS:   - Falls  - ortho: Weight bearing status: WBAT in surgical shoe   - Lungs: Aspiration, Incentive Spirometer   - DVT: Lovenox  ----------------------------------------------  IDT 5/13  Stand pivot T/F during shower max x 2 persons  Urinary hesitancy noted  Pain b/l feet over areas with skin break on dorsum of foot, limiting wt bearing  Most transfers still  Sit to stand mod assist x 1 T/f bed to  [employer] board mod assist x 1  Took 4 steps on parallel bars with mo assist x 2  Making progress towards therapy goals  Barrier--ulcers on feet, limiting wt bearing, urinary hesitancy, environmental barrier at home (stairs), severe obesity  LE weakness  Overall making functional progress, has supportive family,  hence decision to extend DC date and achieve same home dc   Ext 5/30 revised to 6/6  (barriers as noted, making functional progress)     ----------------------------------------------  Dr. CANTU's Liaison with Family/Providers:  5/22--Patient's partner at bedside, gets regular updates on patient's clinical and functional progress  5/2--D/w orthotist Mr Dawn, consult for pressure offloading shoes both legs   ----------------------------------------------  OUTPATIENT/FOLLOW UP:    Your Primary Care Provider,   Phone: (   )    -  Fax: (   )    -  Follow Up Time: 1 week    [facility],   [address] [place] [address]  Phone: [phone]  Fax: (   )    -  Follow Up Time:    Dr. Waterhouse  Podiatry  [phone]  Within 1 week       39 year old male with PMH of morbid obesity, BEA on CPAP, pAFIB (not on AC prior to admission), HTN, and BL papilledema attributed to pseudotumor cerebri; who initially presented to  on 2/24 with SOB and BL LE edema. Found to have URI with progressive SOB and multifocal PNA on imaging. His hospital course was complicated by worsening respiratory distress and increased 02 demands secondary to secretions (requiring multiple intubation attempts) and eventual MICU admission. While in MICU, he 02 requirements continued despite optimal vent management. Concern noted for progressive ARDS, unable to prone given morbid obesity, and ultimately cannulated for vvECMO on 2/25 and transferred to [facility] for further management on 2/26.  His hospital course at [facility] was significant for the following: diuretic and ABX management, s/p trache and PEG (placed on 3/7- PEG since removed), RCU admission, high grade fevers (secondary to panniculitis), progressively worsening sacral ulcer (likely osteomyelitis- s/p surgical debridement), BL foot wound (secondary to pressor use), neuropathy (secondary to critical illness polyneuropathy), ELLA (secondary to vancomycin toxicity and overdiuresis- since DCd- with improvement). Patient now admitted for a multidisciplinary rehab program. 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  (present on admission)   - Gait Instability, ADL impairments and Functional impairments: start Comprehensive Rehab Program of PT/OT  - 3 hours a day, 5 days a week   - PRN: Nebulizer QID     #BEA on BIPAP  --via nasal canula qhs    #Sacral Osteomyelitis  - Zosyn TID - completed on 5/12/25    #Paroxysmal AFIB  - Eliquis 5mg BID     #Calf Tenderness  - BL LE doppler negative 5/21     #L hip pain - resolved resolved   - no acute findings on XR or CT   - L hip MRI (5/7) no significant findings     #HTN  - Amlodipine 10mg dialy     #Sleep/Mood  - Melatonin 9mg at HS   - Quetiapine 25mg at HS     #Skin  Wound Recs per Plastic Surgery (5/16):  Sacral Pressure Injury: BID- Cleanse with NS, remove excess fluid, apply santyl ointment to sloughing tissues, pack wound with 1-inch dry plain packing, cover with 4 inch gauze and abd combine  - R buttock: BID- Cleanse with NS, overlay calcium alginate, ABD combine dressing  Additional wound care instructions:  -->Right anterior thigh to groin isolated wound: Clean wound and periwound skin with NS. Pat dry. Apply liquid barrier film to periwound skin, allow to dry. Cover with silicone foam with border. Change daily or PRN if soiled   --> Bilateral abdominal pannus, bilateral groin: Cleanse with luke-warm soap and water, dry well. Apply clotrimazole  --> Bilateral feet: Apply betadine  to bilateral foot wounds followed by 4x4 gauze, ABD pad, and matilde daily per podiatry.  --> Continue to offload pressure; bariatric low airloss support surface, turn and position per protocol with use of fluidized positioning devices, continue incontinence and moisture care per protocol and use of single breathable incontinence pads, continue to offload heels with fluidized positioning devices. Continue use of bariatric seat cushion, limit sit time- no more than 2 consecutive hours at any given time.  - Pressure injury/Skin: OOB to Chair, PT/OT    - Ammonium lactate to BL LEs     #Neuropathy  - Cymbalta 60mg daily   - Gabapentin 1200mg TID     #Pain Mgmt   - Capsaicin cream to BL feet QID - Avoid use on damaged, broken, or irritated skin. Avoid occlusive dressing or heat   - Lidocaine patch to BL thighs   - PRN:; Oxycodone and Tylenol 650mg QID     #Morbid obesity  - Most recent weight 394lbs (5/7)     #GI/Bowel Mgmt   - Pantoprazole  - Bisacodyl 10mg at HS   - Miralax   - Naloxegal 25mg daily     #/Bladder Mgmt   #ELLA  #Urinary Hesitancy  - USKB 5/20 negative   - Renvela 800mg TID  - Cr improving   - Bladder scan TID  - Flomax 0.8mg at HS     #FEN --Morbid obesity (BMI > 40).  - Diet - Regular + Thins  [CCHO]    - MVI     # Health Management:   Environmental challenge affecting dc plan--narrow door and need for ramp to house entrance  However, patient making goal directed effort towards therapy goals     #Precautions / PROPHYLAXIS:   - Falls  - ortho: Weight bearing status: WBAT in surgical shoe   - Lungs: Aspiration, Incentive Spirometer   - DVT: Lovenox  ----------------------------------------------  IDT 5/13  Stand pivot T/F during shower max x 2 persons  Urinary hesitancy noted  Pain b/l feet over areas with skin break on dorsum of foot, limiting wt bearing  Most transfers still  Sit to stand mod assist x 1 T/f bed to  [employer] board mod assist x 1  Took 4 steps on parallel bars with mo assist x 2  Making progress towards therapy goals  Barrier--ulcers on feet, limiting wt bearing, urinary hesitancy, environmental barrier at home (stairs), severe obesity  LE weakness  Overall making functional progress, has supportive family,  hence decision to extend DC date and achieve same home dc   Ext 5/30 revised to 6/6  (barriers as noted, making functional progress)     ----------------------------------------------  Dr. CANTU's Liaison with Family/Providers:  5/22--Patient's partner at bedside, gets regular updates on patient's clinical and functional progress  5/2--D/w orthotist Mr Dawn, consult for pressure offloading shoes both legs   ----------------------------------------------  OUTPATIENT/FOLLOW UP:    Your Primary Care Provider,   Phone: (   )    -  Fax: (   )    -  Follow Up Time: 1 week    [facility] [place],   [address] [place] [address]  Phone: [phone]  Fax: (   )    -  Follow Up Time:    Dr. Waterhouse  Podiatry  [phone]  Within 1 week

## 2025-05-23 NOTE — PROGRESS NOTE ADULT - SUBJECTIVE AND OBJECTIVE BOX
Subjective  Patient seen and examined this AM.  Admits to sleeping well. No pain at rest.  Experiencing sole of foot pain over the areas with skin break--distal sole of foot with weight bearing and during therapy sessions.   Awaiting offloading shoes from Orthotist.  Discussed family to purchase donut cushion to encourage out of bed, and offload pressure on sacral wound, as we do not have this device.      ROS--no acute pain, no fever or chest pain   B/l leg and sacral ulcers  LBM 5/21    Function  Ambulated 25', 15', 8' with bariatric RW needing MIN/MOD A x 2 and wheelchair  follow. Continues to use modified step to 3 point gait pattern (leading with  Right LE) and excessive offloading with UEs on device due to bilateral foot  pain.  -Distances limited by overall fatigue, UE fatigue, and worsening pain to feet  -Extended rest breaks provided to allow adequate recovery time between walking  trials.    Therapeutic Activity  -Sit to/from stand with bariatric RW needing MOD A x 2. Verbal cuing for proper  foot placement under wheelchair prior to standing attempt and anterior  rocking/weight shifting. Multiple sit to stands performed throughout gait  training.  -Patient returned to bed at end of session with use of overhead Grows Up system    Vital Signs Last 24 Hrs  T(C): 37.6 (22 May 2025 21:20), Max: 37.6 (22 May 2025 21:20)  T(F): 99.7 (22 May 2025 21:20), Max: 99.7 (22 May 2025 21:20)  HR: 101 (23 May 2025 06:15) (96 - 110)  BP: 104/69 (23 May 2025 06:15) (104/69 - 111/68)  BP(mean): --  RR: 16 (22 May 2025 21:20) (16 - 16)  SpO2: 99% (23 May 2025 00:35) (96% - 99%)    EXAM  Gen - Comfortable,   HEENT - NAD  Neck - Supple, No limited ROM  Pulm - Clear  Cardiovascular - RRR, S1S2, No murmurs  Abdomen - Soft, non tender, +BS  Extremities - chronic skin changes with hyperpigmentation at shin      Neuro-  AAO X 3, Speech is normal     Motor - UE 5/5                    LEFT    LE - HF 3/5, KE 3/5 ankle 2/5                    RIGHT LE - HF 3/5, KE 3/5 ankle 2/5      Sensory - Decreased to light touch bilateral lower extremities      Coordination - impaired lower extremities   Psychiatric - Mood stable, Affect WNL    Skin:  R foot plantar aspect lateral/distal side 7cm x 5.5cm ischemic wound from pressors  RLE 4th digit 1x1.5cm ischemic induced wound from pressors  RLE 5th digit 3x2cm ischemic induced wound from pressors  L foot plantar aspect lateral/distal side 6x7cm ischemic induced wound from pressor usage  LLE 4th digit 2x1cm ischemic induced wound from pressors  LLE 5th digit 2.5x3cm ischemic induced wound from pressors   R groin skin tear 1x1.5cm  Unstageable pressure wound cocyx 7cmx4.5cm w/ 2.5cm depth  R buttock pressure induced wound semi contiguous with coccyx wound calling unstageable given prior debridement 8.5x5.5cm  L buttock 1.0x0.5cm stage 3 pressure injury  R buttock skin tear 3.5cm x 0.5cm and 1.5cmx0.5cm  Dry skin with callus on sole of foot     MMS--antigravity upper extremities    LABS:                        8.9    8.36  )-----------( 335      ( 22 May 2025 06:59 )             30.1     05-22    138  |  101  |  11  ----------------------------<  97  3.6   |  30  |  1.06    Ca    9.4      22 May 2025 06:59  Phos  5.8     05-22    TPro  7.1  /  Alb  2.2[L]  /  TBili  0.5  /  DBili  x   /  AST  11  /  ALT  11  /  AlkPhos  56  05-22      Urinalysis Basic - ( 22 May 2025 06:59 )    Color: x / Appearance: x / SG: x / pH: x  Gluc: 97 mg/dL / Ketone: x  / Bili: x / Urobili: x   Blood: x / Protein: x / Nitrite: x   Leuk Esterase: x / RBC: x / WBC x   Sq Epi: x / Non Sq Epi: x / Bacteria: x      < from: US Duplex Venous Lower Ext Complete, Bilateral (05.21.25 @ 19:48) >  PROCEDURE DATE:  05/21/2025    INTERPRETATION:  CLINICAL INFORMATION: Calf tenderness  COMPARISON: 4/29/2025.    IMPRESSION:  Technically difficult study. No evidence of DVT bilateral lower   extremities as visualized.      MEDICATIONS  (STANDING):  amLODIPine   Tablet 10 milliGRAM(s) Oral daily  ammonium lactate 12% Lotion 1 Application(s) Topical two times a day  apixaban 5 milliGRAM(s) Oral two times a day  ascorbic acid 500 milliGRAM(s) Oral daily  bisacodyl 10 milliGRAM(s) Oral at bedtime  capsaicin 0.025% Cream 1 Application(s) Topical four times a day  clotrimazole 1% Cream 1 Application(s) Topical <User Schedule>  collagenase Ointment 1 Application(s) Topical two times a day  Dakins Solution - 1/4 Strength 1 Application(s) Topical two times a day  dextrose 5%. 1000 milliLiter(s) (100 mL/Hr) IV Continuous <Continuous>  dextrose 5%. 1000 milliLiter(s) (50 mL/Hr) IV Continuous <Continuous>  dextrose 50% Injectable 25 Gram(s) IV Push once  dextrose 50% Injectable 12.5 Gram(s) IV Push once  dextrose 50% Injectable 25 Gram(s) IV Push once  DULoxetine 60 milliGRAM(s) Oral daily  gabapentin 1200 milliGRAM(s) Oral every 8 hours  glucagon  Injectable 1 milliGRAM(s) IntraMuscular once  influenza   Vaccine 0.5 milliLiter(s) IntraMuscular once  lidocaine   4% Patch 1 Patch Transdermal daily  lidocaine   4% Patch 1 Patch Transdermal daily  magnesium oxide 400 milliGRAM(s) Oral three times a day with meals  melatonin 9 milliGRAM(s) Oral at bedtime  multivitamin 1 Tablet(s) Oral daily  mupirocin 2% Ointment 1 Application(s) Topical two times a day  naloxegol 25 milliGRAM(s) Oral daily  pantoprazole    Tablet 40 milliGRAM(s) Oral before breakfast  petrolatum white Ointment 1 Application(s) Topical every 8 hours  polyethylene glycol 3350 17 Gram(s) Oral two times a day  povidone iodine 10% Solution 1 Application(s) Topical daily  pregabalin 25 milliGRAM(s) Oral two times a day  QUEtiapine 25 milliGRAM(s) Oral at bedtime  sevelamer carbonate 800 milliGRAM(s) Oral three times a day with meals  silver nitrate Applicator 6 Application(s) Topical once  tamsulosin 0.8 milliGRAM(s) Oral at bedtime  zinc sulfate 220 milliGRAM(s) Oral daily    MEDICATIONS  (PRN):  albuterol/ipratropium for Nebulization 3 milliLiter(s) Nebulizer every 6 hours PRN Shortness of Breath and/or Wheezing  dextrose Oral Gel 15 Gram(s) Oral once PRN Blood Glucose LESS THAN 70 milliGRAM(s)/deciliter  oxyCODONE    IR 20 milliGRAM(s) Oral every 6 hours PRN Severe Pain (7 - 10)  oxyCODONE    IR 15 milliGRAM(s) Oral two times a day PRN Moderate Pain (4 - 6)  oxyCODONE    IR 10 milliGRAM(s) Oral two times a day PRN Mild Pain (1 - 3)     Subjective  Patient seen and examined this AM.  Admits to sleeping well. No pain at rest. tolerating oral diet  Reports that ammonium lactate lotion is being applied to sole of foot as ordered, although the the area remains dry with significant callus  Experiencing sole of foot pain over the areas with skin break--distal sole of foot with weight bearing and during therapy sessions.   Awaiting offloading shoes from Orthotist.  He is happy with his improved ambulatory distance in therapy, but pain over the areas with foot ulcer remains an impediment due to difficulty wt bearing on those areas      ROS--no acute pain, no fever or chest pain   B/l leg and sacral ulcers  LBM 5/21    Function    Ambulated 25', 15', 8' with bariatric RW needing MIN/MOD A x 2 and wheelchair  follow. Continues to use modified step to 3 point gait pattern (leading with  Right LE) and excessive offloading with UEs on device due to bilateral foot  pain.  -Distances limited by overall fatigue, UE fatigue, and worsening pain to feet  -Extended rest breaks provided to allow adequate recovery time between walking  trials.    Therapeutic Activity  -Sit to/from stand with bariatric RW needing MOD A x 2. Verbal cuing for proper  foot placement under wheelchair prior to standing attempt and anterior  rocking/weight shifting. Multiple sit to stands performed throughout gait  training.  -Patient returned to bed at end of session with use of overhead [place]te system    Vital Signs Last 24 Hrs  T(C): 37.6 (22 May 2025 21:20), Max: 37.6 (22 May 2025 21:20)  T(F): 99.7 (22 May 2025 21:20), Max: 99.7 (22 May 2025 21:20)  HR: 101 (23 May 2025 06:15) (96 - 110)  BP: 104/69 (23 May 2025 06:15) (104/69 - 111/68)  BP(mean): --  RR: 16 (22 May 2025 21:20) (16 - 16)  SpO2: 99% (23 May 2025 00:35) (96% - 99%)    EXAM  Gen - Comfortable,   HEENT - NAD  Neck - Supple, No limited ROM  Pulm - Clear  Cardiovascular - RRR, S1S2, No murmurs  Abdomen - Soft, non tender, +BS  Extremities - chronic skin changes with hyperpigmentation at shin      Neuro-  AAO X 3, Speech is normal     Motor - UE 5/5                    LEFT    LE - HF 3/5, KE 3/5 ankle 2/5                    RIGHT LE - HF 3/5, KE 3/5 ankle 2/5      Sensory - Decreased to light touch bilateral lower extremities      Coordination - impaired lower extremities   Psychiatric - Mood stable, Affect WNL    Skin:  R foot plantar aspect lateral/distal side 7cm x 5.5cm ischemic wound from pressors  RLE 4th digit 1x1.5cm ischemic induced wound from pressors  RLE 5th digit 3x2cm ischemic induced wound from pressors  L foot plantar aspect lateral/distal side 6x7cm ischemic induced wound from pressor usage  LLE 4th digit 2x1cm ischemic induced wound from pressors  LLE 5th digit 2.5x3cm ischemic induced wound from pressors   R groin skin tear 1x1.5cm  Unstageable pressure wound cocyx 7cmx4.5cm w/ 2.5cm depth  R buttock pressure induced wound semi contiguous with coccyx wound calling unstageable given prior debridement 8.5x5.5cm  L buttock 1.0x0.5cm stage 3 pressure injury  R buttock skin tear 3.5cm x 0.5cm and 1.5cmx0.5cm  Dry skin with callus on sole of foot     MMS--antigravity upper extremities    LABS:                        8.9    8.36  )-----------( 335      ( 22 May 2025 06:59 )             30.1     05-22    138  |  101  |  11  ----------------------------<  97  3.6   |  30  |  1.06    Ca    9.4      22 May 2025 06:59  Phos  5.8     05-22    TPro  7.1  /  Alb  2.2[L]  /  TBili  0.5  /  DBili  x   /  AST  11  /  ALT  11  /  AlkPhos  56  05-22      Urinalysis Basic - ( 22 May 2025 06:59 )    Color: x / Appearance: x / SG: x / pH: x  Gluc: 97 mg/dL / Ketone: x  / Bili: x / Urobili: x   Blood: x / Protein: x / Nitrite: x   Leuk Esterase: x / RBC: x / WBC x   Sq Epi: x / Non Sq Epi: x / Bacteria: x      < from: US Duplex Venous Lower Ext Complete, Bilateral (05.21.25 @ 19:48) >  PROCEDURE DATE:  05/21/2025    INTERPRETATION:  CLINICAL INFORMATION: Calf tenderness  COMPARISON: 4/29/2025.    IMPRESSION:  Technically difficult study. No evidence of DVT bilateral lower   extremities as visualized.      MEDICATIONS  (STANDING):  amLODIPine   Tablet 10 milliGRAM(s) Oral daily  ammonium lactate 12% Lotion 1 Application(s) Topical two times a day  apixaban 5 milliGRAM(s) Oral two times a day  ascorbic acid 500 milliGRAM(s) Oral daily  bisacodyl 10 milliGRAM(s) Oral at bedtime  capsaicin 0.025% Cream 1 Application(s) Topical four times a day  clotrimazole 1% Cream 1 Application(s) Topical <User Schedule>  collagenase Ointment 1 Application(s) Topical two times a day  Dakins Solution - 1/4 Strength 1 Application(s) Topical two times a day  dextrose 5%. 1000 milliLiter(s) (100 mL/Hr) IV Continuous <Continuous>  dextrose 5%. 1000 milliLiter(s) (50 mL/Hr) IV Continuous <Continuous>  dextrose 50% Injectable 25 Gram(s) IV Push once  dextrose 50% Injectable 12.5 Gram(s) IV Push once  dextrose 50% Injectable 25 Gram(s) IV Push once  DULoxetine 60 milliGRAM(s) Oral daily  gabapentin 1200 milliGRAM(s) Oral every 8 hours  glucagon  Injectable 1 milliGRAM(s) IntraMuscular once  influenza   Vaccine 0.5 milliLiter(s) IntraMuscular once  lidocaine   4% Patch 1 Patch Transdermal daily  lidocaine   4% Patch 1 Patch Transdermal daily  magnesium oxide 400 milliGRAM(s) Oral three times a day with meals  melatonin 9 milliGRAM(s) Oral at bedtime  multivitamin 1 Tablet(s) Oral daily  mupirocin 2% Ointment 1 Application(s) Topical two times a day  naloxegol 25 milliGRAM(s) Oral daily  pantoprazole    Tablet 40 milliGRAM(s) Oral before breakfast  petrolatum white Ointment 1 Application(s) Topical every 8 hours  polyethylene glycol 3350 17 Gram(s) Oral two times a day  povidone iodine 10% Solution 1 Application(s) Topical daily  pregabalin 25 milliGRAM(s) Oral two times a day  QUEtiapine 25 milliGRAM(s) Oral at bedtime  sevelamer carbonate 800 milliGRAM(s) Oral three times a day with meals  silver nitrate Applicator 6 Application(s) Topical once  tamsulosin 0.8 milliGRAM(s) Oral at bedtime  zinc sulfate 220 milliGRAM(s) Oral daily    MEDICATIONS  (PRN):  albuterol/ipratropium for Nebulization 3 milliLiter(s) Nebulizer every 6 hours PRN Shortness of Breath and/or Wheezing  dextrose Oral Gel 15 Gram(s) Oral once PRN Blood Glucose LESS THAN 70 milliGRAM(s)/deciliter  oxyCODONE    IR 20 milliGRAM(s) Oral every 6 hours PRN Severe Pain (7 - 10)  oxyCODONE    IR 15 milliGRAM(s) Oral two times a day PRN Moderate Pain (4 - 6)  oxyCODONE    IR 10 milliGRAM(s) Oral two times a day PRN Mild Pain (1 - 3)

## 2025-05-24 PROCEDURE — 99232 SBSQ HOSP IP/OBS MODERATE 35: CPT | Mod: GC

## 2025-05-24 PROCEDURE — 99232 SBSQ HOSP IP/OBS MODERATE 35: CPT

## 2025-05-24 RX ORDER — METOPROLOL SUCCINATE 50 MG/1
25 TABLET, EXTENDED RELEASE ORAL
Refills: 0 | Status: DISCONTINUED | OUTPATIENT
Start: 2025-05-25 | End: 2025-05-26

## 2025-05-24 RX ADMIN — SEVELAMER HYDROCHLORIDE 800 MILLIGRAM(S): 800 TABLET ORAL at 18:19

## 2025-05-24 RX ADMIN — Medication 1 APPLICATION(S): at 18:17

## 2025-05-24 RX ADMIN — SODIUM HYPOCHLORITE 1 APPLICATION(S): 0.12 SOLUTION TOPICAL at 18:17

## 2025-05-24 RX ADMIN — OXYCODONE HYDROCHLORIDE 20 MILLIGRAM(S): 30 TABLET ORAL at 08:10

## 2025-05-24 RX ADMIN — OXYCODONE HYDROCHLORIDE 20 MILLIGRAM(S): 30 TABLET ORAL at 21:25

## 2025-05-24 RX ADMIN — PREGABALIN 25 MILLIGRAM(S): 50 CAPSULE ORAL at 18:21

## 2025-05-24 RX ADMIN — Medication 400 MILLIGRAM(S): at 08:04

## 2025-05-24 RX ADMIN — WHITE PETROLATUM 1 APPLICATION(S): 1 OINTMENT TOPICAL at 21:29

## 2025-05-24 RX ADMIN — POLYETHYLENE GLYCOL 3350 17 GRAM(S): 17 POWDER, FOR SOLUTION ORAL at 05:16

## 2025-05-24 RX ADMIN — Medication 1 APPLICATION(S): at 12:43

## 2025-05-24 RX ADMIN — APIXABAN 5 MILLIGRAM(S): 2.5 TABLET, FILM COATED ORAL at 05:14

## 2025-05-24 RX ADMIN — PREGABALIN 25 MILLIGRAM(S): 50 CAPSULE ORAL at 05:15

## 2025-05-24 RX ADMIN — OXYCODONE HYDROCHLORIDE 15 MILLIGRAM(S): 30 TABLET ORAL at 18:46

## 2025-05-24 RX ADMIN — OXYCODONE HYDROCHLORIDE 20 MILLIGRAM(S): 30 TABLET ORAL at 14:10

## 2025-05-24 RX ADMIN — MUPIROCIN CALCIUM 1 APPLICATION(S): 20 CREAM TOPICAL at 18:17

## 2025-05-24 RX ADMIN — GABAPENTIN 1200 MILLIGRAM(S): 400 CAPSULE ORAL at 21:25

## 2025-05-24 RX ADMIN — TAMSULOSIN HYDROCHLORIDE 0.8 MILLIGRAM(S): 0.4 CAPSULE ORAL at 21:26

## 2025-05-24 RX ADMIN — Medication 220 MILLIGRAM(S): at 12:39

## 2025-05-24 RX ADMIN — QUETIAPINE FUMARATE 25 MILLIGRAM(S): 25 TABLET ORAL at 21:26

## 2025-05-24 RX ADMIN — Medication 400 MILLIGRAM(S): at 12:38

## 2025-05-24 RX ADMIN — OXYCODONE HYDROCHLORIDE 20 MILLIGRAM(S): 30 TABLET ORAL at 15:17

## 2025-05-24 RX ADMIN — Medication 500 MILLIGRAM(S): at 12:41

## 2025-05-24 RX ADMIN — Medication 1 APPLICATION(S): at 18:18

## 2025-05-24 RX ADMIN — DULOXETINE 60 MILLIGRAM(S): 20 CAPSULE, DELAYED RELEASE ORAL at 12:40

## 2025-05-24 RX ADMIN — OXYCODONE HYDROCHLORIDE 20 MILLIGRAM(S): 30 TABLET ORAL at 00:00

## 2025-05-24 RX ADMIN — GABAPENTIN 1200 MILLIGRAM(S): 400 CAPSULE ORAL at 05:15

## 2025-05-24 RX ADMIN — SEVELAMER HYDROCHLORIDE 800 MILLIGRAM(S): 800 TABLET ORAL at 12:38

## 2025-05-24 RX ADMIN — NALOXEGOL OXALATE 25 MILLIGRAM(S): 12.5 TABLET, FILM COATED ORAL at 13:33

## 2025-05-24 RX ADMIN — SEVELAMER HYDROCHLORIDE 800 MILLIGRAM(S): 800 TABLET ORAL at 08:05

## 2025-05-24 RX ADMIN — SODIUM HYPOCHLORITE 1 APPLICATION(S): 0.12 SOLUTION TOPICAL at 05:16

## 2025-05-24 RX ADMIN — Medication 400 MILLIGRAM(S): at 18:20

## 2025-05-24 RX ADMIN — Medication 1 TABLET(S): at 12:39

## 2025-05-24 RX ADMIN — Medication 40 MILLIGRAM(S): at 05:14

## 2025-05-24 RX ADMIN — Medication 1 APPLICATION(S): at 12:44

## 2025-05-24 RX ADMIN — Medication 1 APPLICATION(S): at 21:28

## 2025-05-24 RX ADMIN — APIXABAN 5 MILLIGRAM(S): 2.5 TABLET, FILM COATED ORAL at 18:19

## 2025-05-24 RX ADMIN — MUPIROCIN CALCIUM 1 APPLICATION(S): 20 CREAM TOPICAL at 05:17

## 2025-05-24 RX ADMIN — Medication 1 APPLICATION(S): at 05:17

## 2025-05-24 RX ADMIN — OXYCODONE HYDROCHLORIDE 20 MILLIGRAM(S): 30 TABLET ORAL at 09:41

## 2025-05-24 RX ADMIN — GABAPENTIN 1200 MILLIGRAM(S): 400 CAPSULE ORAL at 14:08

## 2025-05-24 RX ADMIN — WHITE PETROLATUM 1 APPLICATION(S): 1 OINTMENT TOPICAL at 05:16

## 2025-05-24 RX ADMIN — OXYCODONE HYDROCHLORIDE 20 MILLIGRAM(S): 30 TABLET ORAL at 22:15

## 2025-05-24 RX ADMIN — Medication 9 MILLIGRAM(S): at 21:25

## 2025-05-24 NOTE — PROGRESS NOTE ADULT - SUBJECTIVE AND OBJECTIVE BOX
CC: Patient is a 39y old  Male who presents with a chief complaint of Debility secondary to acute hypoxic respiratory failure.      Interval History:  Patient seen and examined at bedside, lying comfortably in NAD  Denies acute complaints  Vitals: Tachycardiac, BP opitmal  We discussed the persistent tachycardia and h/o paroxysmal A-Fib. Patient agreed with plan to switch from Amlodipine to Metoprolol and monitor the response.    ALLERGIES:  No Known Allergies    MEDICATIONS  (STANDING):  ammonium lactate 12% Lotion 1 Application(s) Topical two times a day  apixaban 5 milliGRAM(s) Oral two times a day  ascorbic acid 500 milliGRAM(s) Oral daily  bisacodyl 10 milliGRAM(s) Oral at bedtime  capsaicin 0.025% Cream 1 Application(s) Topical four times a day  clotrimazole 1% Cream 1 Application(s) Topical <User Schedule>  collagenase Ointment 1 Application(s) Topical two times a day  Dakins Solution - 1/4 Strength 1 Application(s) Topical two times a day  dextrose 5%. 1000 milliLiter(s) (100 mL/Hr) IV Continuous <Continuous>  dextrose 5%. 1000 milliLiter(s) (50 mL/Hr) IV Continuous <Continuous>  dextrose 50% Injectable 25 Gram(s) IV Push once  dextrose 50% Injectable 12.5 Gram(s) IV Push once  dextrose 50% Injectable 25 Gram(s) IV Push once  DULoxetine 60 milliGRAM(s) Oral daily  gabapentin 1200 milliGRAM(s) Oral every 8 hours  glucagon  Injectable 1 milliGRAM(s) IntraMuscular once  influenza   Vaccine 0.5 milliLiter(s) IntraMuscular once  lidocaine   4% Patch 1 Patch Transdermal daily  lidocaine   4% Patch 1 Patch Transdermal daily  magnesium oxide 400 milliGRAM(s) Oral three times a day with meals  melatonin 9 milliGRAM(s) Oral at bedtime  multivitamin 1 Tablet(s) Oral daily  mupirocin 2% Ointment 1 Application(s) Topical two times a day  naloxegol 25 milliGRAM(s) Oral daily  pantoprazole    Tablet 40 milliGRAM(s) Oral before breakfast  petrolatum white Ointment 1 Application(s) Topical every 8 hours  polyethylene glycol 3350 17 Gram(s) Oral two times a day  povidone iodine 10% Solution 1 Application(s) Topical daily  pregabalin 25 milliGRAM(s) Oral two times a day  QUEtiapine 25 milliGRAM(s) Oral at bedtime  sevelamer carbonate 800 milliGRAM(s) Oral three times a day with meals  silver nitrate Applicator 6 Application(s) Topical once  tamsulosin 0.8 milliGRAM(s) Oral at bedtime  zinc sulfate 220 milliGRAM(s) Oral daily    MEDICATIONS  (PRN):  albuterol/ipratropium for Nebulization 3 milliLiter(s) Nebulizer every 6 hours PRN Shortness of Breath and/or Wheezing  dextrose Oral Gel 15 Gram(s) Oral once PRN Blood Glucose LESS THAN 70 milliGRAM(s)/deciliter  oxyCODONE    IR 20 milliGRAM(s) Oral every 6 hours PRN Severe Pain (7 - 10)  oxyCODONE    IR 15 milliGRAM(s) Oral two times a day PRN Moderate Pain (4 - 6)  oxyCODONE    IR 10 milliGRAM(s) Oral two times a day PRN Mild Pain (1 - 3)    Vital Signs Last 24 Hrs  T(F): 98.1 (24 May 2025 08:08), Max: 98.4 (23 May 2025 20:00)  HR: 100 (24 May 2025 08:08) (89 - 110)  BP: 106/67 (24 May 2025 08:08) (104/60 - 123/69)  RR: 16 (24 May 2025 08:08) (16 - 16)  SpO2: 94% (24 May 2025 08:08) (94% - 99%)  I&O's Summary        PHYSICAL EXAM:-  GENERAL: NAD, morbidly obese   NERVOUS SYSTEM: Non focal   CHEST/LUNG: Clear to percussion bilaterally; No rales, rhonchi, wheezing, or rubs; normal respiratory effort, no intercostal retractions  HEART: Regular rate and rhythm; No murmurs, rubs, or gallops; No pitting edema  ABDOMEN: Soft, Nontender, Nondistended   Decubitus wounds of sacrum and b/l heels     LABS:                        8.9    8.36  )-----------( 335      ( 22 May 2025 06:59 )             30.1       05-22    138  |  101  |  11  ----------------------------<  97  3.6   |  30  |  1.06    Ca    9.4      22 May 2025 06:59  Phos  5.8     05-22    TPro  7.1  /  Alb  2.2  /  TBili  0.5  /  DBili  x   /  AST  11  /  ALT  11  /  AlkPhos  56  05-22:-  03-08 Chol -- LDL -- HDL -- Trig 169 mg/dL  Urinalysis Basic - ( 22 May 2025 06:59 )    Color: x / Appearance: x / SG: x / pH: x  Gluc: 97 mg/dL / Ketone: x  / Bili: x / Urobili: x   Blood: x / Protein: x / Nitrite: x   Leuk Esterase: x / RBC: x / WBC x   Sq Epi: x / Non Sq Epi: x / Bacteria: x    COVID-19 PCR: NotDetec (04-25-25 @ 18:31)      Care Discussed with Consultants/Other Providers: Yes

## 2025-05-24 NOTE — PROGRESS NOTE ADULT - ASSESSMENT
39 year old male with PMH of morbid obesity, BEA, pAFIB (not on AC), HTN, and BL papilledema attributed to pseudotumor cerebri; who initially presented to  on 2/24 with SOB and BL LE edema. Found to have URI with progressive SOB and multifocal PNA on imaging. His hospital course was complicated by worsening respiratory distress and increased 02 demands secondary to secretions (requiring multiple intubation attempts) and eventual MICU admission. While in MICU, he 02 requirements continued despite optimal vent management. Concern noted for progressive ARDS, unable to prone given morbid obesity, and ultimately cannulated for vvECMO on 2/25 and transferred to [facility] for further management on 2/26. His hospital course at [facility] was significant for the following: diuretic and ABX management, s/p trach and PEG (placed on 3/7- PEG since removed), RCU admission, high grade fevers (secondary to panniculitis), progressively worsening sacral ulcer (likely osteomyelitis- s/p surgical debridement), BL foot wound (secondary to pressor use), neuropathy (secondary to critical illness polyneuropathy), ELLA (secondary to vancomycin toxicity and overdiuresis- since DCd- with improvement). Patient now admitted for a multidisciplinary rehab program. 04-25-25     # left hip pain - Improving  - hip xray--> no fracture  - CT hip: Again seen is a sacral decubitus wound which extends superficial as well as involving the right gluteus musculature and the posterior L5 with small amount of fluid and gas within the tract. Limited involvement on this noncontrast CT. This likely corresponds to adjacent phlegmonous changes  - MRI left hip: Incompletely characterized sacral wound with associated ill-defined collection of fluid and soft tissue gas which extends to/involve the right gluteus zurdo muscle as at CT pelvis study from one day prior. No interval change in the high STIR signal within the bilateral gluteal and thigh musculature suggestive of a myositis as compared to prior MRI study. No fracture, osseous destruction or aggressive periosteal reaction. No osteomyelitis at the left hip. Edema-like signal along the right and left greater trochanteric regions, overall increased as compared to the prior MRI study from 4/11/2025. Findings are felt to be reactive in etiology..  - pain control per rehab team   - pt/ot    # Abnormal CT A/P:   - 5/2: CT A/P: Question of pneumatosis in the cecum with no other evidence of bowel ischemia. Correlation with lactate levels and clinical exam would be helpful.  - Patient has no symptoms. Abdomen is benign. Tolerating PO  - 5/2: Surgery cx noted: no intervention. c/w PO. refer to bariatric surgery post-DC dr botello at [facility] or Dr. Segovia at [place] [per patient request]  - 5/3 GI cx noted: no GI intervention planned. c/w PO    #Stage IV Sacral Decub Ulcer  #Bilateral Buttocks Wounds  #Bilateral Foot Wounds  -5/2 CT A/P: An air and fluid containing collection in the right gluteus zurdo muscle in continuity with a subcutaneous sacral collection. No obvious skin defect to suggest a decubitus ulcer.  -5/7: MRI left hip: Incompletely characterized sacral wound with associated ill-defined collection of fluid and soft tissue gas which extends to/involve the right gluteus zurdo muscle as at CT pelvis study from one day prior. No interval change in the high STIR signal within the bilateral gluteal and thigh musculature suggestive of a myositis as compared to prior MRI study. No fracture, osseous destruction or aggressive periosteal reaction. No osteomyelitis at the left hip. Edema-like signal along the right and left greater trochanteric regions, overall increased as compared to the prior MRI study from 4/11/2025. Findings are felt to be reactive in etiology..  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 RV is not well visualized, enlarged RV cavity size a/ reduced RV systolic function. No significant valvular disease.  No echocardiographic evidence of pulmonary hypertension.  -s/p Trach (decannulated 3/28) and PEG (removed 4/15)   -Saturating well on RA  -Continue duoneb PRN   -BIPAP QHS (FiO2 40%, 18/10) via nasal mask   -Fall precaution  -Pain control per rehab team   -Outpatient pulm follow up     #Chronic AFIB  #Sinus Tachycardia  - Patient has intermittent tachycardia. Per patient, His HR  mostly above 110 even when he is not sick. follows with cardio OP.   - 5/3: EKG: NSR@94 bpm  - c/w Eliquis   - Will d/c Amlodipine and start Metoprolol tartarate 25 mg BID and monitor the response. Titrate the dose as needed   - TSH WNL 2/25  - Sinus tachycardia likely multifactorial including pain and deconditioning. Low suspicion for PE, no SOB/hypoxia and he is already on full AC    #RIJ and Bilateral LE DVT  -Duplex RUE 3/14 showed Acute, non-occlusive deep vein thrombosis noted within the right internal jugular vein. Superficial vein thrombosis noted within the right antecubital cephalic vein.  -Duplex LE 3/14 showed Acute, occlusive deep vein thromboses noted within the right and left peroneal veins at both mid calves. Age-indeterminate, occlusive deep vein thrombosis visualized within the left gastrocnemius vein at the proximal calf.  -Continue eliquis  -Repeat duplex 4/29 showed No evidence of deep venous thrombosis in either lower extremity.    #Normocytic Anemia   -Likely multifactorial  -H/H stable, no evidence of active bleed   -Monitor CBC     #HTN  -Amlodipine d/c  -Monitor BP     #History of Pseudotumor Cerebri   -Outpatient follow up     #ELLA (Improved)  -Baseline Cr appears to be ~0.8 range   -ELLA felt to be multifactorial in setting of cardiorenal w/ RVE, recurrent ELLA suspected due to vancomycin toxicity and diuresis   -Continue to encourage oral hydration   - Renvela   -Low phos diet  -Monitor BMP and phos level     #Type 2 Diabetes  -HbA1C 6.7 on 2/25, Repeat HbA1C 4.9 on 4/28  -FS trend reviewed, has been within goal  -Monitor glucose on routine lab, controlled     #Urinary Retention  -Flomax 5/1  -Renal u/s 5/20 unremarkable    #Mood  -Continue Seroquel     #DVT PPx - Eliquis    Discussed with rehab team

## 2025-05-24 NOTE — PROGRESS NOTE ADULT - SUBJECTIVE AND OBJECTIVE BOX
Cc: Gait dysfunction    HPI: Patient with no new medical issues today.  Pain controlled, no chest pain, no N/V, no Fevers/Chills. No other new ROS  Has been tolerating rehabilitation program.    albuterol/ipratropium for Nebulization 3 milliLiter(s) Nebulizer every 6 hours PRN  amLODIPine   Tablet 10 milliGRAM(s) Oral daily  ammonium lactate 12% Lotion 1 Application(s) Topical two times a day  apixaban 5 milliGRAM(s) Oral two times a day  ascorbic acid 500 milliGRAM(s) Oral daily  bisacodyl 10 milliGRAM(s) Oral at bedtime  capsaicin 0.025% Cream 1 Application(s) Topical four times a day  clotrimazole 1% Cream 1 Application(s) Topical <User Schedule>  collagenase Ointment 1 Application(s) Topical two times a day  Dakins Solution - 1/4 Strength 1 Application(s) Topical two times a day  dextrose 5%. 1000 milliLiter(s) IV Continuous <Continuous>  dextrose 5%. 1000 milliLiter(s) IV Continuous <Continuous>  dextrose 50% Injectable 25 Gram(s) IV Push once  dextrose 50% Injectable 12.5 Gram(s) IV Push once  dextrose 50% Injectable 25 Gram(s) IV Push once  dextrose Oral Gel 15 Gram(s) Oral once PRN  DULoxetine 60 milliGRAM(s) Oral daily  gabapentin 1200 milliGRAM(s) Oral every 8 hours  glucagon  Injectable 1 milliGRAM(s) IntraMuscular once  influenza   Vaccine 0.5 milliLiter(s) IntraMuscular once  lidocaine   4% Patch 1 Patch Transdermal daily  lidocaine   4% Patch 1 Patch Transdermal daily  magnesium oxide 400 milliGRAM(s) Oral three times a day with meals  melatonin 9 milliGRAM(s) Oral at bedtime  multivitamin 1 Tablet(s) Oral daily  mupirocin 2% Ointment 1 Application(s) Topical two times a day  naloxegol 25 milliGRAM(s) Oral daily  oxyCODONE    IR 20 milliGRAM(s) Oral every 6 hours PRN  oxyCODONE    IR 15 milliGRAM(s) Oral two times a day PRN  oxyCODONE    IR 10 milliGRAM(s) Oral two times a day PRN  pantoprazole    Tablet 40 milliGRAM(s) Oral before breakfast  petrolatum white Ointment 1 Application(s) Topical every 8 hours  polyethylene glycol 3350 17 Gram(s) Oral two times a day  povidone iodine 10% Solution 1 Application(s) Topical daily  pregabalin 25 milliGRAM(s) Oral two times a day  QUEtiapine 25 milliGRAM(s) Oral at bedtime  sevelamer carbonate 800 milliGRAM(s) Oral three times a day with meals  silver nitrate Applicator 6 Application(s) Topical once  tamsulosin 0.8 milliGRAM(s) Oral at bedtime  zinc sulfate 220 milliGRAM(s) Oral daily    T(C): 36.7 (05-24-25 @ 08:08), Max: 36.9 (05-23-25 @ 20:00)  HR: 100 (05-24-25 @ 08:08) (89 - 110)  BP: 106/67 (05-24-25 @ 08:08) (104/60 - 123/69)  RR: 16 (05-24-25 @ 08:08) (16 - 16)  SpO2: 94% (05-24-25 @ 08:08) (94% - 99%)    In NAD  HEENT- EOMI  Heart- RRR, S1S2  Lungs- CTA bl.  Abd- + BS, NT  Ext- No calf pain  Neuro- Exam unchanged    Reviewed 5/22 labs:  WBC: 8.36  Hemoglobin: 8.9  PC: 335  Cr: 1.06 Cc: Gait dysfunction    HPI: Patient with no new medical issues today.  Pain controlled, no chest pain, no N/V, no Fevers/Chills. No other new ROS  Has been tolerating rehabilitation program.    albuterol/ipratropium for Nebulization 3 milliLiter(s) Nebulizer every 6 hours PRN  amLODIPine   Tablet 10 milliGRAM(s) Oral daily  ammonium lactate 12% Lotion 1 Application(s) Topical two times a day  apixaban 5 milliGRAM(s) Oral two times a day  ascorbic acid 500 milliGRAM(s) Oral daily  bisacodyl 10 milliGRAM(s) Oral at bedtime  capsaicin 0.025% Cream 1 Application(s) Topical four times a day  clotrimazole 1% Cream 1 Application(s) Topical <User Schedule>  collagenase Ointment 1 Application(s) Topical two times a day  Dakins Solution - 1/4 Strength 1 Application(s) Topical two times a day  dextrose 5%. 1000 milliLiter(s) IV Continuous <Continuous>  dextrose 5%. 1000 milliLiter(s) IV Continuous <Continuous>  dextrose 50% Injectable 25 Gram(s) IV Push once  dextrose 50% Injectable 12.5 Gram(s) IV Push once  dextrose 50% Injectable 25 Gram(s) IV Push once  dextrose Oral Gel 15 Gram(s) Oral once PRN  DULoxetine 60 milliGRAM(s) Oral daily  gabapentin 1200 milliGRAM(s) Oral every 8 hours  glucagon  Injectable 1 milliGRAM(s) IntraMuscular once  influenza   Vaccine 0.5 milliLiter(s) IntraMuscular once  lidocaine   4% Patch 1 Patch Transdermal daily  lidocaine   4% Patch 1 Patch Transdermal daily  magnesium oxide 400 milliGRAM(s) Oral three times a day with meals  melatonin 9 milliGRAM(s) Oral at bedtime  multivitamin 1 Tablet(s) Oral daily  mupirocin 2% Ointment 1 Application(s) Topical two times a day  naloxegol 25 milliGRAM(s) Oral daily  oxyCODONE    IR 20 milliGRAM(s) Oral every 6 hours PRN  oxyCODONE    IR 15 milliGRAM(s) Oral two times a day PRN  oxyCODONE    IR 10 milliGRAM(s) Oral two times a day PRN  pantoprazole    Tablet 40 milliGRAM(s) Oral before breakfast  petrolatum white Ointment 1 Application(s) Topical every 8 hours  polyethylene glycol 3350 17 Gram(s) Oral two times a day  povidone iodine 10% Solution 1 Application(s) Topical daily  pregabalin 25 milliGRAM(s) Oral two times a day  QUEtiapine 25 milliGRAM(s) Oral at bedtime  sevelamer carbonate 800 milliGRAM(s) Oral three times a day with meals  silver nitrate Applicator 6 Application(s) Topical once  tamsulosin 0.8 milliGRAM(s) Oral at bedtime  zinc sulfate 220 milliGRAM(s) Oral daily    T(C): 36.7 (05-24-25 @ 08:08), Max: 36.9 (05-23-25 @ 20:00)  HR: 100 (05-24-25 @ 08:08) (89 - 110)  BP: 106/67 (05-24-25 @ 08:08) (104/60 - 123/69)  RR: 16 (05-24-25 @ 08:08) (16 - 16)  SpO2: 94% (05-24-25 @ 08:08) (94% - 99%)    In NAD  HEENT- EOMI  Heart- RRR, S1S2  Lungs- CTA bl.  Abd- + BS, NT  Ext- No calf pain  Neuro- Exam unchanged    Reviewed 5/22 labs:  WBC: 8.36  Hemoglobin: 8.9  PC: 335  Cr: 1.06    39 year old male with PMH of morbid obesity, BEA on CPAP, pAFIB (not on AC prior to admission), HTN, and BL papilledema attributed to pseudotumor cerebri; who initially presented to  on 2/24 with SOB and BL LE edema. Found to have URI with progressive SOB and multifocal PNA on imaging. His hospital course was complicated by worsening respiratory distress and increased 02 demands secondary to secretions (requiring multiple intubation attempts) and eventual MICU admission. While in MICU, he 02 requirements continued despite optimal vent management. Concern noted for progressive ARDS, unable to prone given morbid obesity, and ultimately cannulated for vvECMO on 2/25 and transferred to [facility] for further management on 2/26.  His hospital course at [facility] was significant for the following: diuretic and ABX management, s/p trache and PEG (placed on 3/7- PEG since removed), RCU admission, high grade fevers (secondary to panniculitis), progressively worsening sacral ulcer (likely osteomyelitis- s/p surgical debridement), BL foot wound (secondary to pressor use), neuropathy (secondary to critical illness polyneuropathy), ELLA (secondary to vancomycin toxicity and overdiuresis- since DCd- with improvement). Patient now admitted for a multidisciplinary rehab program. 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  (present on admission)   - Gait Instability, ADL impairments and Functional impairments: start Comprehensive Rehab Program of PT/OT  - 3 hours a day, 5 days a week   - PRN: Nebulizer QID     #BEA on BIPAP  --via nasal canula qhs    #Sacral Osteomyelitis  - Zosyn TID - completed on 5/12/25    #Paroxysmal AFIB  - Eliquis 5mg BID     #Calf Tenderness  - BL LE doppler negative 5/21     #L hip pain - resolved resolved   - no acute findings on XR or CT   - L hip MRI (5/7) no significant findings     #HTN  - Amlodipine 10mg dialy     #Sleep/Mood  - Melatonin 9mg at HS   - Quetiapine 25mg at HS     #Skin  Wound Recs per Plastic Surgery (5/16):  Sacral Pressure Injury: BID- Cleanse with NS, remove excess fluid, apply santyl ointment to sloughing tissues, pack wound with 1-inch dry plain packing, cover with 4 inch gauze and abd combine  - R buttock: BID- Cleanse with NS, overlay calcium alginate, ABD combine dressing  Additional wound care instructions:  -->Right anterior thigh to groin isolated wound: Clean wound and periwound skin with NS. Pat dry. Apply liquid barrier film to periwound skin, allow to dry. Cover with silicone foam with border. Change daily or PRN if soiled   --> Bilateral abdominal pannus, bilateral groin: Cleanse with luke-warm soap and water, dry well. Apply clotrimazole  --> Bilateral feet: Apply betadine  to bilateral foot wounds followed by 4x4 gauze, ABD pad, and matilde daily per podiatry.  --> Continue to offload pressure; bariatric low airloss support surface, turn and position per protocol with use of fluidized positioning devices, continue incontinence and moisture care per protocol and use of single breathable incontinence pads, continue to offload heels with fluidized positioning devices. Continue use of bariatric seat cushion, limit sit time- no more than 2 consecutive hours at any given time.  - Pressure injury/Skin: OOB to Chair, PT/OT    - Ammonium lactate to BL LEs     #Neuropathy  - Cymbalta 60mg daily   - Gabapentin 1200mg TID     #Pain Mgmt   - Capsaicin cream to BL feet QID - Avoid use on damaged, broken, or irritated skin. Avoid occlusive dressing or heat   - Lidocaine patch to BL thighs   - PRN:; Oxycodone and Tylenol 650mg QID     #Morbid obesity  - Most recent weight 394lbs (5/7)     #GI/Bowel Mgmt   - Pantoprazole  - Bisacodyl 10mg at HS   - Miralax   - Naloxegal 25mg daily     #/Bladder Mgmt   #ELLA  #Urinary Hesitancy  - USKB 5/20 negative   - Renvela 800mg TID  - Cr improving   - Bladder scan TID  - Flomax 0.8mg at HS     #FEN --Morbid obesity (BMI > 40).  - [facility]    - MVI     # Health Management:   Environmental challenge affecting dc plan--narrow door and need for ramp to house entrance  However, patient making goal directed effort towards therapy goals     #Precautions / PROPHYLAXIS:   - Falls  - ortho: Weight bearing status: WBAT in surgical shoe   - Lungs: Aspiration, Incentive Spirometer   - DVT: Lovenox   Cc: Gait dysfunction    HPI: Patient with no new medical issues today.  Pain controlled, no chest pain, no N/V, no Fevers/Chills. No other new ROS  Has been tolerating rehabilitation program.    albuterol/ipratropium for Nebulization 3 milliLiter(s) Nebulizer every 6 hours PRN  amLODIPine   Tablet 10 milliGRAM(s) Oral daily  ammonium lactate 12% Lotion 1 Application(s) Topical two times a day  apixaban 5 milliGRAM(s) Oral two times a day  ascorbic acid 500 milliGRAM(s) Oral daily  bisacodyl 10 milliGRAM(s) Oral at bedtime  capsaicin 0.025% Cream 1 Application(s) Topical four times a day  clotrimazole 1% Cream 1 Application(s) Topical <User Schedule>  collagenase Ointment 1 Application(s) Topical two times a day  Dakins Solution - 1/4 Strength 1 Application(s) Topical two times a day  dextrose 5%. 1000 milliLiter(s) IV Continuous <Continuous>  dextrose 5%. 1000 milliLiter(s) IV Continuous <Continuous>  dextrose 50% Injectable 25 Gram(s) IV Push once  dextrose 50% Injectable 12.5 Gram(s) IV Push once  dextrose 50% Injectable 25 Gram(s) IV Push once  dextrose Oral Gel 15 Gram(s) Oral once PRN  DULoxetine 60 milliGRAM(s) Oral daily  gabapentin 1200 milliGRAM(s) Oral every 8 hours  glucagon  Injectable 1 milliGRAM(s) IntraMuscular once  influenza   Vaccine 0.5 milliLiter(s) IntraMuscular once  lidocaine   4% Patch 1 Patch Transdermal daily  lidocaine   4% Patch 1 Patch Transdermal daily  magnesium oxide 400 milliGRAM(s) Oral three times a day with meals  melatonin 9 milliGRAM(s) Oral at bedtime  multivitamin 1 Tablet(s) Oral daily  mupirocin 2% Ointment 1 Application(s) Topical two times a day  naloxegol 25 milliGRAM(s) Oral daily  oxyCODONE    IR 20 milliGRAM(s) Oral every 6 hours PRN  oxyCODONE    IR 15 milliGRAM(s) Oral two times a day PRN  oxyCODONE    IR 10 milliGRAM(s) Oral two times a day PRN  pantoprazole    Tablet 40 milliGRAM(s) Oral before breakfast  petrolatum white Ointment 1 Application(s) Topical every 8 hours  polyethylene glycol 3350 17 Gram(s) Oral two times a day  povidone iodine 10% Solution 1 Application(s) Topical daily  pregabalin 25 milliGRAM(s) Oral two times a day  QUEtiapine 25 milliGRAM(s) Oral at bedtime  sevelamer carbonate 800 milliGRAM(s) Oral three times a day with meals  silver nitrate Applicator 6 Application(s) Topical once  tamsulosin 0.8 milliGRAM(s) Oral at bedtime  zinc sulfate 220 milliGRAM(s) Oral daily    T(C): 36.7 (05-24-25 @ 08:08), Max: 36.9 (05-23-25 @ 20:00)  HR: 100 (05-24-25 @ 08:08) (89 - 110)  BP: 106/67 (05-24-25 @ 08:08) (104/60 - 123/69)  RR: 16 (05-24-25 @ 08:08) (16 - 16)  SpO2: 94% (05-24-25 @ 08:08) (94% - 99%)    In NAD  HEENT- EOMI  Heart- RRR, S1S2  Lungs- CTA bl.  Abd- + BS, NT  Ext- No calf pain  Neuro- Exam unchanged    Reviewed 5/22 labs:  WBC: 8.36  Hemoglobin: 8.9  PC: 335  Cr: 1.06    39 year old male with PMH of morbid obesity, BEA on CPAP, pAFIB (not on AC prior to admission), HTN, and BL papilledema attributed to pseudotumor cerebri; who initially presented to  on 2/24 with SOB and BL LE edema. Found to have URI with progressive SOB and multifocal PNA on imaging. His hospital course was complicated by worsening respiratory distress and increased 02 demands secondary to secretions (requiring multiple intubation attempts) and eventual MICU admission. While in MICU, he 02 requirements continued despite optimal vent management. Concern noted for progressive ARDS, unable to prone given morbid obesity, and ultimately cannulated for vvECMO on 2/25 and transferred to [facility] for further management on 2/26.  His hospital course at [facility] was significant for the following: diuretic and ABX management, s/p trache and PEG (placed on 3/7- PEG since removed), RCU admission, high grade fevers (secondary to panniculitis), progressively worsening sacral ulcer (likely osteomyelitis- s/p surgical debridement), BL foot wound (secondary to pressor use), neuropathy (secondary to critical illness polyneuropathy), ELLA (secondary to vancomycin toxicity and overdiuresis- since DCd- with improvement). Patient now admitted for a multidisciplinary rehab program. 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  (present on admission)   - Gait Instability, ADL impairments and Functional impairments: start Comprehensive Rehab Program of PT/OT  - 3 hours a day, 5 days a week   - PRN: Nebulizer QID     #BEA on BIPAP  --via nasal canula qhs    #Sacral Osteomyelitis  - Zosyn TID - completed on 5/12/25    #Paroxysmal AFIB  - Eliquis 5mg BID     #Calf Tenderness  - BL LE doppler negative 5/21     #L hip pain - resolved resolved   - no acute findings on XR or CT   - L hip MRI (5/7) no significant findings     #HTN  - Amlodipine 10mg dialy     #Sleep/Mood  - Melatonin 9mg at HS   - Quetiapine 25mg at HS     #Skin  Wound Recs per Plastic Surgery (5/16):  Sacral Pressure Injury: BID- Cleanse with NS, remove excess fluid, apply santyl ointment to sloughing tissues, pack wound with 1-inch dry plain packing, cover with 4 inch gauze and abd combine  - R buttock: BID- Cleanse with NS, overlay calcium alginate, ABD combine dressing  Additional wound care instructions:  -->Right anterior thigh to groin isolated wound: Clean wound and periwound skin with NS. Pat dry. Apply liquid barrier film to periwound skin, allow to dry. Cover with silicone foam with border. Change daily or PRN if soiled   --> Bilateral abdominal pannus, bilateral groin: Cleanse with luke-warm soap and water, dry well. Apply clotrimazole  --> Bilateral feet: Apply betadine  to bilateral foot wounds followed by 4x4 gauze, ABD pad, and matilde daily per podiatry.  --> Continue to offload pressure; bariatric low airloss support surface, turn and position per protocol with use of fluidized positioning devices, continue incontinence and moisture care per protocol and use of single breathable incontinence pads, continue to offload heels with fluidized positioning devices. Continue use of bariatric seat cushion, limit sit time- no more than 2 consecutive hours at any given time.  - Pressure injury/Skin: OOB to Chair, PT/OT    - Ammonium lactate to BL LEs     #Neuropathy  - Cymbalta 60mg daily   - Gabapentin 1200mg TID     #Pain Mgmt   - Capsaicin cream to BL feet QID - Avoid use on damaged, broken, or irritated skin. Avoid occlusive dressing or heat   - Lidocaine patch to BL thighs   - PRN:; Oxycodone and Tylenol 650mg QID     #Morbid obesity  - Most recent weight 394lbs (5/7)     #GI/Bowel Mgmt   - Pantoprazole  - Bisacodyl 10mg at HS   - Miralax   - Naloxegal 25mg daily     #/Bladder Mgmt   #ELLA  #Urinary Hesitancy  - USKB 5/20 negative   - Renvela 800mg TID  - Cr improving   - Bladder scan TID  - Flomax 0.8mg at HS     #FEN --Morbid obesity (BMI > 40).  - [facility]    - MVI     # Health Management:   Environmental challenge affecting dc plan--narrow door and need for ramp to house entrance  However, patient making goal directed effort towards therapy goals     #Precautions / PROPHYLAXIS:   - Falls  - ortho: Weight bearing status: WBAT in surgical shoe   - Lungs: Aspiration, Incentive Spirometer   - DVT: apixaban

## 2025-05-25 PROCEDURE — 99232 SBSQ HOSP IP/OBS MODERATE 35: CPT | Mod: GC

## 2025-05-25 PROCEDURE — 99232 SBSQ HOSP IP/OBS MODERATE 35: CPT

## 2025-05-25 RX ADMIN — Medication 400 MILLIGRAM(S): at 08:00

## 2025-05-25 RX ADMIN — DULOXETINE 60 MILLIGRAM(S): 20 CAPSULE, DELAYED RELEASE ORAL at 12:59

## 2025-05-25 RX ADMIN — Medication 1 APPLICATION(S): at 21:33

## 2025-05-25 RX ADMIN — OXYCODONE HYDROCHLORIDE 15 MILLIGRAM(S): 30 TABLET ORAL at 23:04

## 2025-05-25 RX ADMIN — METOPROLOL SUCCINATE 25 MILLIGRAM(S): 50 TABLET, EXTENDED RELEASE ORAL at 19:32

## 2025-05-25 RX ADMIN — SEVELAMER HYDROCHLORIDE 800 MILLIGRAM(S): 800 TABLET ORAL at 12:59

## 2025-05-25 RX ADMIN — APIXABAN 5 MILLIGRAM(S): 2.5 TABLET, FILM COATED ORAL at 06:51

## 2025-05-25 RX ADMIN — SODIUM HYPOCHLORITE 1 APPLICATION(S): 0.12 SOLUTION TOPICAL at 19:32

## 2025-05-25 RX ADMIN — Medication 1 APPLICATION(S): at 19:36

## 2025-05-25 RX ADMIN — Medication 40 MILLIGRAM(S): at 06:51

## 2025-05-25 RX ADMIN — METOPROLOL SUCCINATE 25 MILLIGRAM(S): 50 TABLET, EXTENDED RELEASE ORAL at 06:53

## 2025-05-25 RX ADMIN — QUETIAPINE FUMARATE 25 MILLIGRAM(S): 25 TABLET ORAL at 21:29

## 2025-05-25 RX ADMIN — Medication 1 TABLET(S): at 13:01

## 2025-05-25 RX ADMIN — APIXABAN 5 MILLIGRAM(S): 2.5 TABLET, FILM COATED ORAL at 19:32

## 2025-05-25 RX ADMIN — Medication 9 MILLIGRAM(S): at 21:29

## 2025-05-25 RX ADMIN — SEVELAMER HYDROCHLORIDE 800 MILLIGRAM(S): 800 TABLET ORAL at 08:00

## 2025-05-25 RX ADMIN — TAMSULOSIN HYDROCHLORIDE 0.8 MILLIGRAM(S): 0.4 CAPSULE ORAL at 21:29

## 2025-05-25 RX ADMIN — NALOXEGOL OXALATE 25 MILLIGRAM(S): 12.5 TABLET, FILM COATED ORAL at 13:00

## 2025-05-25 RX ADMIN — SODIUM HYPOCHLORITE 1 APPLICATION(S): 0.12 SOLUTION TOPICAL at 06:57

## 2025-05-25 RX ADMIN — WHITE PETROLATUM 1 APPLICATION(S): 1 OINTMENT TOPICAL at 06:56

## 2025-05-25 RX ADMIN — GABAPENTIN 1200 MILLIGRAM(S): 400 CAPSULE ORAL at 06:50

## 2025-05-25 RX ADMIN — MUPIROCIN CALCIUM 1 APPLICATION(S): 20 CREAM TOPICAL at 06:57

## 2025-05-25 RX ADMIN — GABAPENTIN 1200 MILLIGRAM(S): 400 CAPSULE ORAL at 21:29

## 2025-05-25 RX ADMIN — Medication 1 APPLICATION(S): at 07:03

## 2025-05-25 RX ADMIN — Medication 1 APPLICATION(S): at 06:56

## 2025-05-25 RX ADMIN — OXYCODONE HYDROCHLORIDE 20 MILLIGRAM(S): 30 TABLET ORAL at 12:57

## 2025-05-25 RX ADMIN — Medication 400 MILLIGRAM(S): at 19:31

## 2025-05-25 RX ADMIN — Medication 220 MILLIGRAM(S): at 13:01

## 2025-05-25 RX ADMIN — Medication 500 MILLIGRAM(S): at 13:00

## 2025-05-25 RX ADMIN — PREGABALIN 25 MILLIGRAM(S): 50 CAPSULE ORAL at 06:51

## 2025-05-25 RX ADMIN — Medication 400 MILLIGRAM(S): at 13:00

## 2025-05-25 RX ADMIN — MUPIROCIN CALCIUM 1 APPLICATION(S): 20 CREAM TOPICAL at 19:36

## 2025-05-25 RX ADMIN — OXYCODONE HYDROCHLORIDE 20 MILLIGRAM(S): 30 TABLET ORAL at 19:30

## 2025-05-25 RX ADMIN — OXYCODONE HYDROCHLORIDE 20 MILLIGRAM(S): 30 TABLET ORAL at 06:50

## 2025-05-25 RX ADMIN — Medication 1 APPLICATION(S): at 06:57

## 2025-05-25 RX ADMIN — GABAPENTIN 1200 MILLIGRAM(S): 400 CAPSULE ORAL at 14:06

## 2025-05-25 RX ADMIN — SEVELAMER HYDROCHLORIDE 800 MILLIGRAM(S): 800 TABLET ORAL at 19:31

## 2025-05-25 RX ADMIN — PREGABALIN 25 MILLIGRAM(S): 50 CAPSULE ORAL at 19:30

## 2025-05-25 NOTE — PROGRESS NOTE ADULT - SUBJECTIVE AND OBJECTIVE BOX
CC: Patient is a 39y old  Male who presents with a chief complaint of Debility secondary to acute hypoxic respiratory failure.    Interval History:  Patient seen and examined at bedside.  No overnight events  No complaints this morning  Vitals: BP low normal, HR  min  Started on Metoprolol today, monitor    ALLERGIES:  No Known Allergies    MEDICATIONS  (STANDING):  ammonium lactate 12% Lotion 1 Application(s) Topical two times a day  apixaban 5 milliGRAM(s) Oral two times a day  ascorbic acid 500 milliGRAM(s) Oral daily  bisacodyl 10 milliGRAM(s) Oral at bedtime  capsaicin 0.025% Cream 1 Application(s) Topical four times a day  clotrimazole 1% Cream 1 Application(s) Topical <User Schedule>  collagenase Ointment 1 Application(s) Topical two times a day  Dakins Solution - 1/4 Strength 1 Application(s) Topical two times a day  dextrose 5%. 1000 milliLiter(s) (100 mL/Hr) IV Continuous <Continuous>  dextrose 5%. 1000 milliLiter(s) (50 mL/Hr) IV Continuous <Continuous>  dextrose 50% Injectable 25 Gram(s) IV Push once  dextrose 50% Injectable 12.5 Gram(s) IV Push once  dextrose 50% Injectable 25 Gram(s) IV Push once  DULoxetine 60 milliGRAM(s) Oral daily  gabapentin 1200 milliGRAM(s) Oral every 8 hours  glucagon  Injectable 1 milliGRAM(s) IntraMuscular once  influenza   Vaccine 0.5 milliLiter(s) IntraMuscular once  lidocaine   4% Patch 1 Patch Transdermal daily  lidocaine   4% Patch 1 Patch Transdermal daily  magnesium oxide 400 milliGRAM(s) Oral three times a day with meals  melatonin 9 milliGRAM(s) Oral at bedtime  metoprolol tartrate 25 milliGRAM(s) Oral two times a day  multivitamin 1 Tablet(s) Oral daily  mupirocin 2% Ointment 1 Application(s) Topical two times a day  naloxegol 25 milliGRAM(s) Oral daily  pantoprazole    Tablet 40 milliGRAM(s) Oral before breakfast  petrolatum white Ointment 1 Application(s) Topical every 8 hours  polyethylene glycol 3350 17 Gram(s) Oral two times a day  povidone iodine 10% Solution 1 Application(s) Topical daily  pregabalin 25 milliGRAM(s) Oral two times a day  Quetiapine 25 milliGRAM(s) Oral at bedtime  sevelamer carbonate 800 milliGRAM(s) Oral three times a day with meals  silver nitrate Applicator 6 Application(s) Topical once  tamsulosin 0.8 milliGRAM(s) Oral at bedtime  zinc sulfate 220 milliGRAM(s) Oral daily    MEDICATIONS  (PRN):  albuterol/ipratropium for Nebulization 3 milliLiter(s) Nebulizer every 6 hours PRN Shortness of Breath and/or Wheezing  dextrose Oral Gel 15 Gram(s) Oral once PRN Blood Glucose LESS THAN 70 milliGRAM(s)/deciliter  oxyCODONE    IR 20 milliGRAM(s) Oral every 6 hours PRN Severe Pain (7 - 10)  oxyCODONE    IR 15 milliGRAM(s) Oral two times a day PRN Moderate Pain (4 - 6)  oxyCODONE    IR 10 milliGRAM(s) Oral two times a day PRN Mild Pain (1 - 3)    Vital Signs Last 24 Hrs  T(F): 98.3 (25 May 2025 08:04), Max: 100.2 (24 May 2025 21:18)  HR: 95 (25 May 2025 08:04) (95 - 106)  BP: 114/75 (25 May 2025 08:04) (110/61 - 114/75)  RR: 16 (25 May 2025 08:04) (16 - 16)  SpO2: 96% (25 May 2025 08:04) (95% - 96%)  I&O's Summary    24 May 2025 07:01  -  25 May 2025 07:00  --------------------------------------------------------  IN: 0 mL / OUT: 750 mL / NET: -750 mL          PHYSICAL EXAM:  GENERAL: NAD, morbidly obese   NERVOUS SYSTEM:  Non focal  CHEST/LUNG: Clear to percussion bilaterally; No rales, rhonchi, wheezing, or rubs; normal respiratory effort, no intercostal retractions  HEART: Regular rate and rhythm; No murmurs, rubs, or gallops; No pitting edema  ABDOMEN: Soft, Nontender, Nondistended; Bowel sounds present; No HSM or masses  MUSCULOSKELETAL/EXTREMITIES:  2+ Peripheral Pulses, No clubbing or digital cyanosis;   Decubitus ulcers: sacrum, b/l heels      LABS:          03-08 Chol -- LDL -- HDL -- Trig 169 mg/dL      COVID-19 PCR: NotDetec (04-25-25 @ 18:31)      Care Discussed with Consultants/Other Providers: Yes

## 2025-05-25 NOTE — PROGRESS NOTE ADULT - SUBJECTIVE AND OBJECTIVE BOX
Cc: Gait dysfunction    HPI: Patient with no new medical issues today.  Pain controlled, no chest pain, no N/V, no Fevers/Chills. No other new ROS  Has been tolerating rehabilitation program.    albuterol/ipratropium for Nebulization 3 milliLiter(s) Nebulizer every 6 hours PRN  ammonium lactate 12% Lotion 1 Application(s) Topical two times a day  apixaban 5 milliGRAM(s) Oral two times a day  ascorbic acid 500 milliGRAM(s) Oral daily  bisacodyl 10 milliGRAM(s) Oral at bedtime  capsaicin 0.025% Cream 1 Application(s) Topical four times a day  clotrimazole 1% Cream 1 Application(s) Topical <User Schedule>  collagenase Ointment 1 Application(s) Topical two times a day  Dakins Solution - 1/4 Strength 1 Application(s) Topical two times a day  dextrose 5%. 1000 milliLiter(s) IV Continuous <Continuous>  dextrose 5%. 1000 milliLiter(s) IV Continuous <Continuous>  dextrose 50% Injectable 25 Gram(s) IV Push once  dextrose 50% Injectable 12.5 Gram(s) IV Push once  dextrose 50% Injectable 25 Gram(s) IV Push once  dextrose Oral Gel 15 Gram(s) Oral once PRN  DULoxetine 60 milliGRAM(s) Oral daily  gabapentin 1200 milliGRAM(s) Oral every 8 hours  glucagon  Injectable 1 milliGRAM(s) IntraMuscular once  influenza   Vaccine 0.5 milliLiter(s) IntraMuscular once  lidocaine   4% Patch 1 Patch Transdermal daily  lidocaine   4% Patch 1 Patch Transdermal daily  magnesium oxide 400 milliGRAM(s) Oral three times a day with meals  melatonin 9 milliGRAM(s) Oral at bedtime  metoprolol tartrate 25 milliGRAM(s) Oral two times a day  multivitamin 1 Tablet(s) Oral daily  mupirocin 2% Ointment 1 Application(s) Topical two times a day  naloxegol 25 milliGRAM(s) Oral daily  oxyCODONE    IR 20 milliGRAM(s) Oral every 6 hours PRN  oxyCODONE    IR 15 milliGRAM(s) Oral two times a day PRN  oxyCODONE    IR 10 milliGRAM(s) Oral two times a day PRN  pantoprazole    Tablet 40 milliGRAM(s) Oral before breakfast  petrolatum white Ointment 1 Application(s) Topical every 8 hours  polyethylene glycol 3350 17 Gram(s) Oral two times a day  povidone iodine 10% Solution 1 Application(s) Topical daily  pregabalin 25 milliGRAM(s) Oral two times a day  QUEtiapine 25 milliGRAM(s) Oral at bedtime  sevelamer carbonate 800 milliGRAM(s) Oral three times a day with meals  silver nitrate Applicator 6 Application(s) Topical once  tamsulosin 0.8 milliGRAM(s) Oral at bedtime  zinc sulfate 220 milliGRAM(s) Oral daily    T(C): 36.8 (05-25-25 @ 08:04), Max: 37.9 (05-24-25 @ 21:18)  HR: 95 (05-25-25 @ 08:04) (95 - 106)  BP: 114/75 (05-25-25 @ 08:04) (110/61 - 114/75)  RR: 16 (05-25-25 @ 08:04) (16 - 16)  SpO2: 96% (05-25-25 @ 08:04) (95% - 96%)    In NAD  HEENT- EOMI  Heart- RRR, S1S2  Lungs- CTA bl.  Abd- + BS, NT  Ext- No calf pain  Neuro- Exam unchanged    Reviewed 5/24 notes:  Medicine: Stopped Amlodipine. Started Metoprolol          Imp: Patient with diagnosis of          admitted for comprehensive acute rehabilitation.    Plan:  - Continue PT/OT/SLP  - DVT prophylaxis  - Skin- Turn q2h, check skin daily  - Continue current medications; patient medically stable.   -Active issues-   - Patient is stable to continue current rehabilitation program.    Cc: Gait dysfunction    HPI: Patient with no new medical issues today.  Pain controlled, no chest pain, no N/V, no Fevers/Chills. No other new ROS  Has been tolerating rehabilitation program.    albuterol/ipratropium for Nebulization 3 milliLiter(s) Nebulizer every 6 hours PRN  ammonium lactate 12% Lotion 1 Application(s) Topical two times a day  apixaban 5 milliGRAM(s) Oral two times a day  ascorbic acid 500 milliGRAM(s) Oral daily  bisacodyl 10 milliGRAM(s) Oral at bedtime  capsaicin 0.025% Cream 1 Application(s) Topical four times a day  clotrimazole 1% Cream 1 Application(s) Topical <User Schedule>  collagenase Ointment 1 Application(s) Topical two times a day  Dakins Solution - 1/4 Strength 1 Application(s) Topical two times a day  dextrose 5%. 1000 milliLiter(s) IV Continuous <Continuous>  dextrose 5%. 1000 milliLiter(s) IV Continuous <Continuous>  dextrose 50% Injectable 25 Gram(s) IV Push once  dextrose 50% Injectable 12.5 Gram(s) IV Push once  dextrose 50% Injectable 25 Gram(s) IV Push once  dextrose Oral Gel 15 Gram(s) Oral once PRN  DULoxetine 60 milliGRAM(s) Oral daily  gabapentin 1200 milliGRAM(s) Oral every 8 hours  glucagon  Injectable 1 milliGRAM(s) IntraMuscular once  influenza   Vaccine 0.5 milliLiter(s) IntraMuscular once  lidocaine   4% Patch 1 Patch Transdermal daily  lidocaine   4% Patch 1 Patch Transdermal daily  magnesium oxide 400 milliGRAM(s) Oral three times a day with meals  melatonin 9 milliGRAM(s) Oral at bedtime  metoprolol tartrate 25 milliGRAM(s) Oral two times a day  multivitamin 1 Tablet(s) Oral daily  mupirocin 2% Ointment 1 Application(s) Topical two times a day  naloxegol 25 milliGRAM(s) Oral daily  oxyCODONE    IR 20 milliGRAM(s) Oral every 6 hours PRN  oxyCODONE    IR 15 milliGRAM(s) Oral two times a day PRN  oxyCODONE    IR 10 milliGRAM(s) Oral two times a day PRN  pantoprazole    Tablet 40 milliGRAM(s) Oral before breakfast  petrolatum white Ointment 1 Application(s) Topical every 8 hours  polyethylene glycol 3350 17 Gram(s) Oral two times a day  povidone iodine 10% Solution 1 Application(s) Topical daily  pregabalin 25 milliGRAM(s) Oral two times a day  QUEtiapine 25 milliGRAM(s) Oral at bedtime  sevelamer carbonate 800 milliGRAM(s) Oral three times a day with meals  silver nitrate Applicator 6 Application(s) Topical once  tamsulosin 0.8 milliGRAM(s) Oral at bedtime  zinc sulfate 220 milliGRAM(s) Oral daily    T(C): 36.8 (05-25-25 @ 08:04), Max: 37.9 (05-24-25 @ 21:18)  HR: 95 (05-25-25 @ 08:04) (95 - 106)  BP: 114/75 (05-25-25 @ 08:04) (110/61 - 114/75)  RR: 16 (05-25-25 @ 08:04) (16 - 16)  SpO2: 96% (05-25-25 @ 08:04) (95% - 96%)    In NAD  HEENT- EOMI  Heart- RRR, S1S2  Lungs- CTA bl.  Abd- + BS, NT  Ext- No calf pain  Neuro- Exam unchanged    Reviewed 5/24 notes:  Medicine: Stopped Amlodipine. Started Metoprolol    #Chronic AFIB  #Sinus Tachycardia  - Patient has intermittent tachycardia. Per patient, His HR  mostly above 110 even when he is not sick. follows with cardio OP.   - 5/3: EKG: NSR@94 bpm  - c/w Eliquis   - Will d/c Amlodipine and start Metoprolol tartarate 25 mg BID and monitor the response. Titrate the dose as needed   - TSH WNL 2/25  - Sinus tachycardia likely multifactorial including pain and deconditioning. Low suspicion for PE, no SOB/hypoxia and he is already on full AC          Imp: Patient with diagnosis of          admitted for comprehensive acute rehabilitation.    Plan:  - Continue PT/OT/SLP  - DVT prophylaxis  - Skin- Turn q2h, check skin daily  - Continue current medications; patient medically stable.   -Active issues-   - Patient is stable to continue current rehabilitation program.    Cc: Gait dysfunction    HPI: Patient with no new medical issues today.  Pain controlled, no chest pain, no N/V, no Fevers/Chills. No other new ROS  Has been tolerating rehabilitation program.    albuterol/ipratropium for Nebulization 3 milliLiter(s) Nebulizer every 6 hours PRN  ammonium lactate 12% Lotion 1 Application(s) Topical two times a day  apixaban 5 milliGRAM(s) Oral two times a day  ascorbic acid 500 milliGRAM(s) Oral daily  bisacodyl 10 milliGRAM(s) Oral at bedtime  capsaicin 0.025% Cream 1 Application(s) Topical four times a day  clotrimazole 1% Cream 1 Application(s) Topical <User Schedule>  collagenase Ointment 1 Application(s) Topical two times a day  Dakins Solution - 1/4 Strength 1 Application(s) Topical two times a day  dextrose 5%. 1000 milliLiter(s) IV Continuous <Continuous>  dextrose 5%. 1000 milliLiter(s) IV Continuous <Continuous>  dextrose 50% Injectable 25 Gram(s) IV Push once  dextrose 50% Injectable 12.5 Gram(s) IV Push once  dextrose 50% Injectable 25 Gram(s) IV Push once  dextrose Oral Gel 15 Gram(s) Oral once PRN  DULoxetine 60 milliGRAM(s) Oral daily  gabapentin 1200 milliGRAM(s) Oral every 8 hours  glucagon  Injectable 1 milliGRAM(s) IntraMuscular once  influenza   Vaccine 0.5 milliLiter(s) IntraMuscular once  lidocaine   4% Patch 1 Patch Transdermal daily  lidocaine   4% Patch 1 Patch Transdermal daily  magnesium oxide 400 milliGRAM(s) Oral three times a day with meals  melatonin 9 milliGRAM(s) Oral at bedtime  metoprolol tartrate 25 milliGRAM(s) Oral two times a day  multivitamin 1 Tablet(s) Oral daily  mupirocin 2% Ointment 1 Application(s) Topical two times a day  naloxegol 25 milliGRAM(s) Oral daily  oxyCODONE    IR 20 milliGRAM(s) Oral every 6 hours PRN  oxyCODONE    IR 15 milliGRAM(s) Oral two times a day PRN  oxyCODONE    IR 10 milliGRAM(s) Oral two times a day PRN  pantoprazole    Tablet 40 milliGRAM(s) Oral before breakfast  petrolatum white Ointment 1 Application(s) Topical every 8 hours  polyethylene glycol 3350 17 Gram(s) Oral two times a day  povidone iodine 10% Solution 1 Application(s) Topical daily  pregabalin 25 milliGRAM(s) Oral two times a day  QUEtiapine 25 milliGRAM(s) Oral at bedtime  sevelamer carbonate 800 milliGRAM(s) Oral three times a day with meals  silver nitrate Applicator 6 Application(s) Topical once  tamsulosin 0.8 milliGRAM(s) Oral at bedtime  zinc sulfate 220 milliGRAM(s) Oral daily    T(C): 36.8 (05-25-25 @ 08:04), Max: 37.9 (05-24-25 @ 21:18)  HR: 95 (05-25-25 @ 08:04) (95 - 106)  BP: 114/75 (05-25-25 @ 08:04) (110/61 - 114/75)  RR: 16 (05-25-25 @ 08:04) (16 - 16)  SpO2: 96% (05-25-25 @ 08:04) (95% - 96%)    In NAD  HEENT- EOMI  Heart- RRR, S1S2  Lungs- CTA bl.  Abd- + BS, NT  Ext- No calf pain  Neuro- Exam unchanged    Reviewed 5/24 notes:  Medicine: Stopped Amlodipine. Started Metoprolol  Nurse: [facility] ADLs score 16  Respiratory Therapist: CPAP/BIPAP done on 5/24    A/P:    39 year old male with PMH of morbid obesity, BEA on CPAP, pAFIB (not on AC prior to admission), HTN, and BL papilledema attributed to pseudotumor cerebri; who initially presented to  on 2/24 with SOB and BL LE edema. Found to have URI with progressive SOB and multifocal PNA on imaging. His hospital course was complicated by worsening respiratory distress and increased 02 demands secondary to secretions (requiring multiple intubation attempts) and eventual MICU admission. While in MICU, he 02 requirements continued despite optimal vent management. Concern noted for progressive ARDS, unable to prone given morbid obesity, and ultimately cannulated for vvECMO on 2/25 and transferred to [facility] for further management on 2/26.  His hospital course at [facility] was significant for the following: diuretic and ABX management, s/p trache and PEG (placed on 3/7- PEG since removed), RCU admission, high grade fevers (secondary to panniculitis), progressively worsening sacral ulcer (likely osteomyelitis- s/p surgical debridement), BL foot wound (secondary to pressor use), neuropathy (secondary to critical illness polyneuropathy), ELLA (secondary to vancomycin toxicity and overdiuresis- since DCd- with improvement). Patient now admitted for a multidisciplinary rehab program. 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  (present on admission)   - Gait Instability, ADL impairments and Functional impairments: start Comprehensive Rehab Program of PT/OT  - 3 hours a day, 5 days a week   - PRN: Nebulizer QID     #BEA on BIPAP  --via nasal canula qhs    #Chronic AFIB  #Sinus Tachycardia  - Patient has intermittent tachycardia. Per patient, His HR  mostly above 110 even when he is not sick. follows with cardio OP.   - 5/3: EKG: NSR@94 bpm  - c/w Eliquis   - Will d/c Amlodipine and start Metoprolol tartarate 25 mg BID and monitor the response. Titrate the dose as needed   - TSH WNL 2/25  - Sinus tachycardia likely multifactorial including pain and deconditioning. Low suspicion for PE, no SOB/hypoxia and he is already on full AC    #Sacral Osteomyelitis  - Zosyn TID - completed on 5/12/25    #Paroxysmal AFIB  - Eliquis 5mg BID     #Calf Tenderness  - BL LE doppler negative 5/21     #L hip pain - resolved resolved   - no acute findings on XR or CT   - L hip MRI (5/7) no significant findings     #HTN  - Amlodipine 10mg dialy     #Sleep/Mood  - Melatonin 9mg at HS   - Quetiapine 25mg at HS     #Skin  Wound Recs per Plastic Surgery (5/16):  Sacral Pressure Injury: BID- Cleanse with NS, remove excess fluid, apply santyl ointment to sloughing tissues, pack wound with 1-inch dry plain packing, cover with 4 inch gauze and abd combine  - R buttock: BID- Cleanse with NS, overlay calcium alginate, ABD combine dressing  Additional wound care instructions:  -->Right anterior thigh to groin isolated wound: Clean wound and periwound skin with NS. Pat dry. Apply liquid barrier film to periwound skin, allow to dry. Cover with silicone foam with border. Change daily or PRN if soiled   --> Bilateral abdominal pannus, bilateral groin: Cleanse with luke-warm soap and water, dry well. Apply clotrimazole  --> Bilateral feet: Apply betadine  to bilateral foot wounds followed by 4x4 gauze, ABD pad, and matilde daily per podiatry.  --> Continue to offload pressure; bariatric low airloss support surface, turn and position per protocol with use of fluidized positioning devices, continue incontinence and moisture care per protocol and use of single breathable incontinence pads, continue to offload heels with fluidized positioning devices. Continue use of bariatric seat cushion, limit sit time- no more than 2 consecutive hours at any given time.  - Pressure injury/Skin: OOB to Chair, PT/OT    - Ammonium lactate to BL LEs     #Neuropathy  - Cymbalta 60mg daily   - Gabapentin 1200mg TID     #Pain Mgmt   - Capsaicin cream to BL feet QID - Avoid use on damaged, broken, or irritated skin. Avoid occlusive dressing or heat   - Lidocaine patch to BL thighs   - PRN:; Oxycodone and Tylenol 650mg QID     #Morbid obesity  - Most recent weight 394lbs (5/7)     #GI/Bowel Mgmt   - Pantoprazole  - Bisacodyl 10mg at HS   - Miralax   - Naloxegal 25mg daily     #/Bladder Mgmt   #ELLA  #Urinary Hesitancy  - USKB 5/20 negative   - Renvela 800mg TID  - Cr improving   - Bladder scan TID  - Flomax 0.8mg at HS     #FEN --Morbid obesity (BMI > 40).  - Diet - Regular + Thins  [CCHO]    - MVI     # Health Management:   Environmental challenge affecting dc plan--narrow door and need for ramp to house entrance  However, patient making goal directed effort towards therapy goals     #Precautions / PROPHYLAXIS:   - Falls  - ortho: Weight bearing status: WBAT in surgical shoe   - Lungs: Aspiration, Incentive Spirometer   - DVT: apixaban

## 2025-05-25 NOTE — PROGRESS NOTE ADULT - ASSESSMENT
39 year old male with PMH of morbid obesity, BEA, pAFIB (not on AC), HTN, and BL papilledema attributed to pseudotumor cerebri; who initially presented to  on 2/24 with SOB and BL LE edema. Found to have URI with progressive SOB and multifocal PNA on imaging. His hospital course was complicated by worsening respiratory distress and increased 02 demands secondary to secretions (requiring multiple intubation attempts) and eventual MICU admission. While in MICU, he 02 requirements continued despite optimal vent management. Concern noted for progressive ARDS, unable to prone given morbid obesity, and ultimately cannulated for vvECMO on 2/25 and transferred to [facility] for further management on 2/26. His hospital course at [facility] was significant for the following: diuretic and ABX management, s/p trach and PEG (placed on 3/7- PEG since removed), RCU admission, high grade fevers (secondary to panniculitis), progressively worsening sacral ulcer (likely osteomyelitis- s/p surgical debridement), BL foot wound (secondary to pressor use), neuropathy (secondary to critical illness polyneuropathy), ELLA (secondary to vancomycin toxicity and overdiuresis- since DCd- with improvement). Patient now admitted for a multidisciplinary rehab program. 04-25-25     # left hip pain - Improving  - hip xray--> no fracture  - CT hip: Again seen is a sacral decubitus wound which extends superficial as well as involving the right gluteus musculature and the posterior L5 with small amount of fluid and gas within the tract. Limited involvement on this noncontrast CT. This likely corresponds to adjacent phlegmonous changes  - MRI left hip: Incompletely characterized sacral wound with associated ill-defined collection of fluid and soft tissue gas which extends to/involve the right gluteus zurdo muscle as at CT pelvis study from one day prior. No interval change in the high STIR signal within the bilateral gluteal and thigh musculature suggestive of a myositis as compared to prior MRI study. No fracture, osseous destruction or aggressive periosteal reaction. No osteomyelitis at the left hip. Edema-like signal along the right and left greater trochanteric regions, overall increased as compared to the prior MRI study from 4/11/2025. Findings are felt to be reactive in etiology..  - pain control per rehab team   - pt/ot    # Abnormal CT A/P:   - 5/2: CT A/P: Question of pneumatosis in the cecum with no other evidence of bowel ischemia. Correlation with lactate levels and clinical exam would be helpful.  - Patient has no symptoms. Abdomen is benign. Tolerating PO  - 5/2: Surgery cx noted: no intervention. c/w PO. refer to bariatric surgery post-DC dr botello at [place] or Dr. Segovia at [place] [per patient request]  - 5/3 GI cx noted: no GI intervention planned. c/w PO    #Stage IV Sacral Decub Ulcer  #Bilateral Buttocks Wounds  #Bilateral Foot Wounds  -5/2 CT A/P: An air and fluid containing collection in the right gluteus zurdo muscle in continuity with a subcutaneous sacral collection. No obvious skin defect to suggest a decubitus ulcer.  -5/7: MRI left hip: Incompletely characterized sacral wound with associated ill-defined collection of fluid and soft tissue gas which extends to/involve the right gluteus zurdo muscle as at CT pelvis study from one day prior. No interval change in the high STIR signal within the bilateral gluteal and thigh musculature suggestive of a myositis as compared to prior MRI study. No fracture, osseous destruction or aggressive periosteal reaction. No osteomyelitis at the left hip. Edema-like signal along the right and left greater trochanteric regions, overall increased as compared to the prior MRI study from 4/11/2025. Findings are felt to be reactive in etiology..  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 RV is not well visualized, enlarged RV cavity size a/ reduced RV systolic function. No significant valvular disease.  No echocardiographic evidence of pulmonary hypertension.  -s/p Trach (decannulated 3/28) and PEG (removed 4/15)   -Saturating well on RA  -Continue duoneb PRN   -BIPAP QHS (FiO2 40%, 18/10) via nasal mask   -Fall precaution  -Pain control per rehab team   -Outpatient pulm follow up     # p-AFIB  #Sinus Tachycardia  - Patient has intermittent tachycardia. Per patient, His HR  mostly above 110 even when he is not sick. follows with cardio OP.   - 5/3: EKG: NSR@94 bpm  - c/w Eliquis   - Amlodipine discontinued and started Metoprolol tartarate 25 mg BID and monitor the response. Titrate the dose as needed   - TSH WNL 2/25  - Sinus tachycardia likely multifactorial including pain and deconditioning. Low suspicion for PE, no SOB/hypoxia and he is already on full AC    #RIJ and Bilateral LE DVT  -Duplex RUE 3/14 showed Acute, non-occlusive deep vein thrombosis noted within the right internal jugular vein. Superficial vein thrombosis noted within the right antecubital cephalic vein.  -Duplex LE 3/14 showed Acute, occlusive deep vein thromboses noted within the right and left peroneal veins at both mid calves. Age-indeterminate, occlusive deep vein thrombosis visualized within the left gastrocnemius vein at the proximal calf.  -Continue eliquis  -Repeat duplex 4/29 showed No evidence of deep venous thrombosis in either lower extremity.    #Normocytic Anemia   -Likely multifactorial  -H/H stable, no evidence of active bleed   -Monitor CBC     #HTN  -Amlodipine d/melly  -Metoprolol tartrate 25 MG BID   -Monitor BP     #History of Pseudotumor Cerebri   -Outpatient follow up     #ELLA - resolved   -Baseline Cr appears to be ~0.8 range   -ELLA felt to be multifactorial in setting of cardiorenal w/ RVE, recurrent ELLA suspected due to vancomycin toxicity and diuresis   -Continue to encourage oral hydration   - Renvela   -Low phos diet  -Monitor BMP and phos level     #Type 2 Diabetes  -HbA1C 6.7 on 2/25, Repeat HbA1C 4.9 on 4/28  -FS trend reviewed, has been within goal  -Monitor glucose on routine lab, controlled     #Urinary Retention  -Flomax 5/1  -Renal u/s 5/20 unremarkable    #Mood  -Continue Seroquel     #DVT PPx - Eliquis    Discussed with rehab team

## 2025-05-26 PROCEDURE — 99232 SBSQ HOSP IP/OBS MODERATE 35: CPT | Mod: GC

## 2025-05-26 PROCEDURE — 99232 SBSQ HOSP IP/OBS MODERATE 35: CPT

## 2025-05-26 RX ORDER — METOPROLOL SUCCINATE 50 MG/1
50 TABLET, EXTENDED RELEASE ORAL
Refills: 0 | Status: DISCONTINUED | OUTPATIENT
Start: 2025-05-26 | End: 2025-06-05

## 2025-05-26 RX ADMIN — SEVELAMER HYDROCHLORIDE 800 MILLIGRAM(S): 800 TABLET ORAL at 10:07

## 2025-05-26 RX ADMIN — TAMSULOSIN HYDROCHLORIDE 0.8 MILLIGRAM(S): 0.4 CAPSULE ORAL at 21:54

## 2025-05-26 RX ADMIN — OXYCODONE HYDROCHLORIDE 20 MILLIGRAM(S): 30 TABLET ORAL at 21:54

## 2025-05-26 RX ADMIN — GABAPENTIN 1200 MILLIGRAM(S): 400 CAPSULE ORAL at 06:22

## 2025-05-26 RX ADMIN — Medication 9 MILLIGRAM(S): at 21:54

## 2025-05-26 RX ADMIN — Medication 1 TABLET(S): at 13:04

## 2025-05-26 RX ADMIN — GABAPENTIN 1200 MILLIGRAM(S): 400 CAPSULE ORAL at 21:54

## 2025-05-26 RX ADMIN — MUPIROCIN CALCIUM 1 APPLICATION(S): 20 CREAM TOPICAL at 06:24

## 2025-05-26 RX ADMIN — APIXABAN 5 MILLIGRAM(S): 2.5 TABLET, FILM COATED ORAL at 06:22

## 2025-05-26 RX ADMIN — Medication 1 APPLICATION(S): at 06:24

## 2025-05-26 RX ADMIN — Medication 1 APPLICATION(S): at 19:21

## 2025-05-26 RX ADMIN — PREGABALIN 25 MILLIGRAM(S): 50 CAPSULE ORAL at 18:56

## 2025-05-26 RX ADMIN — Medication 400 MILLIGRAM(S): at 13:04

## 2025-05-26 RX ADMIN — MUPIROCIN CALCIUM 1 APPLICATION(S): 20 CREAM TOPICAL at 19:22

## 2025-05-26 RX ADMIN — OXYCODONE HYDROCHLORIDE 20 MILLIGRAM(S): 30 TABLET ORAL at 22:54

## 2025-05-26 RX ADMIN — WHITE PETROLATUM 1 APPLICATION(S): 1 OINTMENT TOPICAL at 13:30

## 2025-05-26 RX ADMIN — Medication 1 APPLICATION(S): at 13:29

## 2025-05-26 RX ADMIN — GABAPENTIN 1200 MILLIGRAM(S): 400 CAPSULE ORAL at 13:05

## 2025-05-26 RX ADMIN — Medication 40 MILLIGRAM(S): at 06:22

## 2025-05-26 RX ADMIN — APIXABAN 5 MILLIGRAM(S): 2.5 TABLET, FILM COATED ORAL at 18:57

## 2025-05-26 RX ADMIN — Medication 220 MILLIGRAM(S): at 13:04

## 2025-05-26 RX ADMIN — SEVELAMER HYDROCHLORIDE 800 MILLIGRAM(S): 800 TABLET ORAL at 18:56

## 2025-05-26 RX ADMIN — OXYCODONE HYDROCHLORIDE 15 MILLIGRAM(S): 30 TABLET ORAL at 10:07

## 2025-05-26 RX ADMIN — SODIUM HYPOCHLORITE 1 APPLICATION(S): 0.12 SOLUTION TOPICAL at 06:24

## 2025-05-26 RX ADMIN — Medication 1 APPLICATION(S): at 13:30

## 2025-05-26 RX ADMIN — METOPROLOL SUCCINATE 25 MILLIGRAM(S): 50 TABLET, EXTENDED RELEASE ORAL at 06:22

## 2025-05-26 RX ADMIN — OXYCODONE HYDROCHLORIDE 20 MILLIGRAM(S): 30 TABLET ORAL at 06:22

## 2025-05-26 RX ADMIN — CLOTRIMAZOLE 1 APPLICATION(S): 1 CREAM TOPICAL at 13:29

## 2025-05-26 RX ADMIN — SODIUM HYPOCHLORITE 1 APPLICATION(S): 0.12 SOLUTION TOPICAL at 19:22

## 2025-05-26 RX ADMIN — Medication 400 MILLIGRAM(S): at 18:57

## 2025-05-26 RX ADMIN — Medication 1 APPLICATION(S): at 07:02

## 2025-05-26 RX ADMIN — SEVELAMER HYDROCHLORIDE 800 MILLIGRAM(S): 800 TABLET ORAL at 13:05

## 2025-05-26 RX ADMIN — METOPROLOL SUCCINATE 50 MILLIGRAM(S): 50 TABLET, EXTENDED RELEASE ORAL at 18:56

## 2025-05-26 RX ADMIN — QUETIAPINE FUMARATE 25 MILLIGRAM(S): 25 TABLET ORAL at 21:54

## 2025-05-26 RX ADMIN — OXYCODONE HYDROCHLORIDE 15 MILLIGRAM(S): 30 TABLET ORAL at 18:56

## 2025-05-26 RX ADMIN — OXYCODONE HYDROCHLORIDE 15 MILLIGRAM(S): 30 TABLET ORAL at 11:02

## 2025-05-26 RX ADMIN — Medication 400 MILLIGRAM(S): at 10:07

## 2025-05-26 RX ADMIN — Medication 500 MILLIGRAM(S): at 13:04

## 2025-05-26 RX ADMIN — PREGABALIN 25 MILLIGRAM(S): 50 CAPSULE ORAL at 06:22

## 2025-05-26 RX ADMIN — NALOXEGOL OXALATE 25 MILLIGRAM(S): 12.5 TABLET, FILM COATED ORAL at 13:04

## 2025-05-26 RX ADMIN — Medication 1 APPLICATION(S): at 06:23

## 2025-05-26 RX ADMIN — DULOXETINE 60 MILLIGRAM(S): 20 CAPSULE, DELAYED RELEASE ORAL at 13:04

## 2025-05-26 NOTE — PROGRESS NOTE ADULT - SUBJECTIVE AND OBJECTIVE BOX
CC: Patient is a 39y old  Male who presents with a chief complaint of Debility secondary to acute hypoxic respiratory failure.      Interval History:  Patient seen and examined at bedside.  No overnight events  No complaints this morning  HR a little better on Metoprolol 25 MG BID, BP MAP 77 mmHg, will increase the dose of Metoprolol and monitor     ALLERGIES:  No Known Allergies    MEDICATIONS  (STANDING):  ammonium lactate 12% Lotion 1 Application(s) Topical two times a day  apixaban 5 milliGRAM(s) Oral two times a day  ascorbic acid 500 milliGRAM(s) Oral daily  bisacodyl 10 milliGRAM(s) Oral at bedtime  capsaicin 0.025% Cream 1 Application(s) Topical four times a day  clotrimazole 1% Cream 1 Application(s) Topical <User Schedule>  collagenase Ointment 1 Application(s) Topical two times a day  Dakins Solution - 1/4 Strength 1 Application(s) Topical two times a day  dextrose 5%. 1000 milliLiter(s) (100 mL/Hr) IV Continuous <Continuous>  dextrose 5%. 1000 milliLiter(s) (50 mL/Hr) IV Continuous <Continuous>  dextrose 50% Injectable 25 Gram(s) IV Push once  dextrose 50% Injectable 12.5 Gram(s) IV Push once  dextrose 50% Injectable 25 Gram(s) IV Push once  DULoxetine 60 milliGRAM(s) Oral daily  gabapentin 1200 milliGRAM(s) Oral every 8 hours  glucagon  Injectable 1 milliGRAM(s) IntraMuscular once  influenza   Vaccine 0.5 milliLiter(s) IntraMuscular once  lidocaine   4% Patch 1 Patch Transdermal daily  lidocaine   4% Patch 1 Patch Transdermal daily  magnesium oxide 400 milliGRAM(s) Oral three times a day with meals  melatonin 9 milliGRAM(s) Oral at bedtime  metoprolol tartrate 50 milliGRAM(s) Oral two times a day  multivitamin 1 Tablet(s) Oral daily  mupirocin 2% Ointment 1 Application(s) Topical two times a day  naloxegol 25 milliGRAM(s) Oral daily  pantoprazole    Tablet 40 milliGRAM(s) Oral before breakfast  petrolatum white Ointment 1 Application(s) Topical every 8 hours  polyethylene glycol 3350 17 Gram(s) Oral two times a day  povidone iodine 10% Solution 1 Application(s) Topical daily  pregabalin 25 milliGRAM(s) Oral two times a day  QUEtiapine 25 milliGRAM(s) Oral at bedtime  sevelamer carbonate 800 milliGRAM(s) Oral three times a day with meals  silver nitrate Applicator 6 Application(s) Topical once  tamsulosin 0.8 milliGRAM(s) Oral at bedtime  zinc sulfate 220 milliGRAM(s) Oral daily    MEDICATIONS  (PRN):  albuterol/ipratropium for Nebulization 3 milliLiter(s) Nebulizer every 6 hours PRN Shortness of Breath and/or Wheezing  dextrose Oral Gel 15 Gram(s) Oral once PRN Blood Glucose LESS THAN 70 milliGRAM(s)/deciliter  oxyCODONE    IR 20 milliGRAM(s) Oral every 6 hours PRN Severe Pain (7 - 10)  oxyCODONE    IR 15 milliGRAM(s) Oral two times a day PRN Moderate Pain (4 - 6)  oxyCODONE    IR 10 milliGRAM(s) Oral two times a day PRN Mild Pain (1 - 3)    Vital Signs Last 24 Hrs  T(F): 98.5 (25 May 2025 19:57), Max: 98.5 (25 May 2025 19:57)  HR: 111 (26 May 2025 06:11) (111 - 111)  BP: 100/60 (26 May 2025 06:11) (100/60 - 110/60)  RR: 16 (26 May 2025 06:11) (16 - 16)  SpO2: 95% (26 May 2025 06:11) (95% - 95%)  I&O's Summary    25 May 2025 07:01  -  26 May 2025 07:00  --------------------------------------------------------  IN: 0 mL / OUT: 700 mL / NET: -700 mL          PHYSICAL EXAM:  GENERAL: NAD, Morbidly obese  NERVOUS SYSTEM:  CN II - XII intact; Sensation intact; follows commands  CHEST/LUNG: Clear to percussion bilaterally; No rales, rhonchi, wheezing, or rubs; normal respiratory effort, no intercostal retractions  HEART: Regular rate and rhythm; No murmurs, rubs, or gallops; No pitting edema  ABDOMEN: Soft, Nontender, Nondistended; Bowel sounds present; No HSM or masses  MUSCULOSKELETAL/EXTREMITIES:  2+ Peripheral Pulses, No clubbing or digital cyanosis;   Decubitus ulcer: sacrum and b/l heels    LABS:                        03-08 Chol -- LDL -- HDL -- Trig 169 mg/dL                          Care Discussed with Consultants/Other Providers: Yes

## 2025-05-26 NOTE — PROGRESS NOTE ADULT - ASSESSMENT
39 year old male with PMH of morbid obesity, BEA, pAFIB (not on AC), HTN, and BL papilledema attributed to pseudotumor cerebri; who initially presented to  on 2/24 with SOB and BL LE edema. Found to have URI with progressive SOB and multifocal PNA on imaging. His hospital course was complicated by worsening respiratory distress and increased 02 demands secondary to secretions (requiring multiple intubation attempts) and eventual MICU admission. While in MICU, he 02 requirements continued despite optimal vent management. Concern noted for progressive ARDS, unable to prone given morbid obesity, and ultimately cannulated for vvECMO on 2/25 and transferred to [facility] for further management on 2/26. His hospital course at [facility] was significant for the following: diuretic and ABX management, s/p trach and PEG (placed on 3/7- PEG since removed), RCU admission, high grade fevers (secondary to panniculitis), progressively worsening sacral ulcer (likely osteomyelitis- s/p surgical debridement), BL foot wound (secondary to pressor use), neuropathy (secondary to critical illness polyneuropathy), ELLA (secondary to vancomycin toxicity and overdiuresis- since DCd- with improvement). Patient now admitted for a multidisciplinary rehab program. 04-25-25     # left hip pain - Improving  - hip xray--> no fracture  - CT hip: Again seen is a sacral decubitus wound which extends superficial as well as involving the right gluteus musculature and the posterior L5 with small amount of fluid and gas within the tract. Limited involvement on this noncontrast CT. This likely corresponds to adjacent phlegmonous changes  - MRI left hip: Incompletely characterized sacral wound with associated ill-defined collection of fluid and soft tissue gas which extends to/involve the right gluteus zurdo muscle as at CT pelvis study from one day prior. No interval change in the high STIR signal within the bilateral gluteal and thigh musculature suggestive of a myositis as compared to prior MRI study. No fracture, osseous destruction or aggressive periosteal reaction. No osteomyelitis at the left hip. Edema-like signal along the right and left greater trochanteric regions, overall increased as compared to the prior MRI study from 4/11/2025. Findings are felt to be reactive in etiology..  - pain control per rehab team   - PT/OT    # Abnormal CT A/P:   - 5/2: CT A/P: Question of pneumatosis in the cecum with no other evidence of bowel ischemia. Correlation with lactate levels and clinical exam would be helpful.  - Patient has no symptoms. Abdomen is benign. Tolerating PO  - 5/2: Surgery cx noted: no intervention. c/w PO. refer to bariatric surgery post-DC dr botello at [place] or Dr. Segovia at [place] [per patient request]  - 5/3 GI cx noted: no GI intervention planned. c/w PO    #Stage IV Sacral Decub Ulcer  #Bilateral Buttocks Wounds  #Bilateral Foot Wounds  -5/2 CT A/P: An air and fluid containing collection in the right gluteus zurdo muscle in continuity with a subcutaneous sacral collection. No obvious skin defect to suggest a decubitus ulcer.  -5/7: MRI left hip: Incompletely characterized sacral wound with associated ill-defined collection of fluid and soft tissue gas which extends to/involve the right gluteus zurdo muscle as at CT pelvis study from one day prior. No interval change in the high STIR signal within the bilateral gluteal and thigh musculature suggestive of a myositis as compared to prior MRI study. No fracture, osseous destruction or aggressive periosteal reaction. No osteomyelitis at the left hip. Edema-like signal along the right and left greater trochanteric regions, overall increased as compared to the prior MRI study from 4/11/2025. Findings are felt to be reactive in etiology..  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 RV is not well visualized, enlarged RV cavity size a/ reduced RV systolic function. No significant valvular disease.  No echocardiographic evidence of pulmonary hypertension.  -s/p Trach (decannulated 3/28) and PEG (removed 4/15)   -Saturating well on RA  -Continue duoneb PRN   -BIPAP QHS (FiO2 40%, 18/10) via nasal mask   -Fall precaution  -Pain control per rehab team   -Outpatient pulm follow up     # p-AFIB  #Sinus Tachycardia  - Patient has intermittent tachycardia. Per patient, His HR  mostly above 110 even when he is not sick. follows with cardio OP.   - 5/3: EKG: NSR@94 bpm  - c/w Eliquis   - Amlodipine discontinued and started Metoprolol tartarate 25 mg BID, which increased to 50 mg BID  - TSH WNL 2/25  - Sinus tachycardia likely multifactorial including pain and deconditioning. Low suspicion for PE, no SOB/hypoxia and he is already on full AC    #RIJ and Bilateral LE DVT  -Duplex RUE 3/14 showed Acute, non-occlusive deep vein thrombosis noted within the right internal jugular vein. Superficial vein thrombosis noted within the right antecubital cephalic vein.  -Duplex LE 3/14 showed Acute, occlusive deep vein thromboses noted within the right and left peroneal veins at both mid calves. Age-indeterminate, occlusive deep vein thrombosis visualized within the left gastrocnemius vein at the proximal calf.  -Continue eliquis  -Repeat duplex 4/29 showed No evidence of deep venous thrombosis in either lower extremity.    #Normocytic Anemia   -Likely multifactorial  -H/H stable, no evidence of active bleed   -Monitor CBC     #HTN  -Amlodipine d/melly  -Metoprolol tartrate 50 MG BID   -Monitor BP     #History of Pseudotumor Cerebri   -Outpatient follow up     #ELLA - resolved   -Baseline Cr appears to be ~0.8 range   -ELLA felt to be multifactorial in setting of cardiorenal w/ RVE, recurrent ELLA suspected due to vancomycin toxicity and diuresis   -Continue to encourage oral hydration   - Renvela   -Low phos diet  -Monitor BMP and phos level     #Type 2 Diabetes  -HbA1C 6.7 on 2/25, Repeat HbA1C 4.9 on 4/28  -FS trend reviewed, has been within goal  -Monitor glucose on routine lab, controlled     #Urinary Retention  -Flomax 5/1  -Renal u/s 5/20 unremarkable    #Mood  -Continue Seroquel :   #DVT PPx - Eliquis    # GI ppx: PPI    Discussed with rehab team

## 2025-05-26 NOTE — PROGRESS NOTE ADULT - SUBJECTIVE AND OBJECTIVE BOX
Cc: Gait dysfunction    HPI: Patient with no new medical issues today.  Pain controlled, no chest pain, no N/V, no Fevers/Chills. No other new ROS  Has been tolerating rehabilitation program.    albuterol/ipratropium for Nebulization 3 milliLiter(s) Nebulizer every 6 hours PRN  ammonium lactate 12% Lotion 1 Application(s) Topical two times a day  apixaban 5 milliGRAM(s) Oral two times a day  ascorbic acid 500 milliGRAM(s) Oral daily  bisacodyl 10 milliGRAM(s) Oral at bedtime  capsaicin 0.025% Cream 1 Application(s) Topical four times a day  clotrimazole 1% Cream 1 Application(s) Topical <User Schedule>  collagenase Ointment 1 Application(s) Topical two times a day  Dakins Solution - 1/4 Strength 1 Application(s) Topical two times a day  dextrose 5%. 1000 milliLiter(s) IV Continuous <Continuous>  dextrose 5%. 1000 milliLiter(s) IV Continuous <Continuous>  dextrose 50% Injectable 25 Gram(s) IV Push once  dextrose 50% Injectable 12.5 Gram(s) IV Push once  dextrose 50% Injectable 25 Gram(s) IV Push once  dextrose Oral Gel 15 Gram(s) Oral once PRN  DULoxetine 60 milliGRAM(s) Oral daily  gabapentin 1200 milliGRAM(s) Oral every 8 hours  glucagon  Injectable 1 milliGRAM(s) IntraMuscular once  influenza   Vaccine 0.5 milliLiter(s) IntraMuscular once  lidocaine   4% Patch 1 Patch Transdermal daily  lidocaine   4% Patch 1 Patch Transdermal daily  magnesium oxide 400 milliGRAM(s) Oral three times a day with meals  melatonin 9 milliGRAM(s) Oral at bedtime  metoprolol tartrate 25 milliGRAM(s) Oral two times a day  multivitamin 1 Tablet(s) Oral daily  mupirocin 2% Ointment 1 Application(s) Topical two times a day  naloxegol 25 milliGRAM(s) Oral daily  oxyCODONE    IR 20 milliGRAM(s) Oral every 6 hours PRN  oxyCODONE    IR 15 milliGRAM(s) Oral two times a day PRN  oxyCODONE    IR 10 milliGRAM(s) Oral two times a day PRN  pantoprazole    Tablet 40 milliGRAM(s) Oral before breakfast  petrolatum white Ointment 1 Application(s) Topical every 8 hours  polyethylene glycol 3350 17 Gram(s) Oral two times a day  povidone iodine 10% Solution 1 Application(s) Topical daily  pregabalin 25 milliGRAM(s) Oral two times a day  QUEtiapine 25 milliGRAM(s) Oral at bedtime  sevelamer carbonate 800 milliGRAM(s) Oral three times a day with meals  silver nitrate Applicator 6 Application(s) Topical once  tamsulosin 0.8 milliGRAM(s) Oral at bedtime  zinc sulfate 220 milliGRAM(s) Oral daily    T(C): 36.9 (05-25-25 @ 19:57), Max: 36.9 (05-25-25 @ 19:57)  HR: 111 (05-26-25 @ 06:11) (111 - 111)  BP: 100/60 (05-26-25 @ 06:11) (100/60 - 110/60)  RR: 16 (05-26-25 @ 06:11) (16 - 16)  SpO2: 95% (05-26-25 @ 06:11) (95% - 95%)    In NAD  HEENT- EOMI  Heart- RRR, S1S2  Lungs- CTA bl.  Abd- + BS, NT  Ext- No calf pain  Neuro- Exam unchanged    Reviewed 5/25 notes:  Medicine: No changes  Nurse: [facility] ADLs 16  PCA: Voided x3 Cc: Gait dysfunction    HPI: Patient with no new medical issues today.  Pain controlled, no chest pain, no N/V, no Fevers/Chills. No other new ROS  Has been tolerating rehabilitation program.    albuterol/ipratropium for Nebulization 3 milliLiter(s) Nebulizer every 6 hours PRN  ammonium lactate 12% Lotion 1 Application(s) Topical two times a day  apixaban 5 milliGRAM(s) Oral two times a day  ascorbic acid 500 milliGRAM(s) Oral daily  bisacodyl 10 milliGRAM(s) Oral at bedtime  capsaicin 0.025% Cream 1 Application(s) Topical four times a day  clotrimazole 1% Cream 1 Application(s) Topical <User Schedule>  collagenase Ointment 1 Application(s) Topical two times a day  Dakins Solution - 1/4 Strength 1 Application(s) Topical two times a day  dextrose 5%. 1000 milliLiter(s) IV Continuous <Continuous>  dextrose 5%. 1000 milliLiter(s) IV Continuous <Continuous>  dextrose 50% Injectable 25 Gram(s) IV Push once  dextrose 50% Injectable 12.5 Gram(s) IV Push once  dextrose 50% Injectable 25 Gram(s) IV Push once  dextrose Oral Gel 15 Gram(s) Oral once PRN  DULoxetine 60 milliGRAM(s) Oral daily  gabapentin 1200 milliGRAM(s) Oral every 8 hours  glucagon  Injectable 1 milliGRAM(s) IntraMuscular once  influenza   Vaccine 0.5 milliLiter(s) IntraMuscular once  lidocaine   4% Patch 1 Patch Transdermal daily  lidocaine   4% Patch 1 Patch Transdermal daily  magnesium oxide 400 milliGRAM(s) Oral three times a day with meals  melatonin 9 milliGRAM(s) Oral at bedtime  metoprolol tartrate 25 milliGRAM(s) Oral two times a day  multivitamin 1 Tablet(s) Oral daily  mupirocin 2% Ointment 1 Application(s) Topical two times a day  naloxegol 25 milliGRAM(s) Oral daily  oxyCODONE    IR 20 milliGRAM(s) Oral every 6 hours PRN  oxyCODONE    IR 15 milliGRAM(s) Oral two times a day PRN  oxyCODONE    IR 10 milliGRAM(s) Oral two times a day PRN  pantoprazole    Tablet 40 milliGRAM(s) Oral before breakfast  petrolatum white Ointment 1 Application(s) Topical every 8 hours  polyethylene glycol 3350 17 Gram(s) Oral two times a day  povidone iodine 10% Solution 1 Application(s) Topical daily  pregabalin 25 milliGRAM(s) Oral two times a day  QUEtiapine 25 milliGRAM(s) Oral at bedtime  sevelamer carbonate 800 milliGRAM(s) Oral three times a day with meals  silver nitrate Applicator 6 Application(s) Topical once  tamsulosin 0.8 milliGRAM(s) Oral at bedtime  zinc sulfate 220 milliGRAM(s) Oral daily    T(C): 36.9 (05-25-25 @ 19:57), Max: 36.9 (05-25-25 @ 19:57)  HR: 111 (05-26-25 @ 06:11) (111 - 111)  BP: 100/60 (05-26-25 @ 06:11) (100/60 - 110/60)  RR: 16 (05-26-25 @ 06:11) (16 - 16)  SpO2: 95% (05-26-25 @ 06:11) (95% - 95%)    In NAD  HEENT- EOMI  Heart- RRR, S1S2  Lungs- CTA bl.  Abd- + BS, NT  Ext- No calf pain  Neuro- Exam unchanged    Reviewed 5/25 notes:  Medicine: No changes  Nurse: [facility] ADLs 16  PCA: Voided x3    A/P:    39 year old male with PMH of morbid obesity, BEA on CPAP, pAFIB (not on AC prior to admission), HTN, and BL papilledema attributed to pseudotumor cerebri; who initially presented to  on 2/24 with SOB and BL LE edema. Found to have URI with progressive SOB and multifocal PNA on imaging. His hospital course was complicated by worsening respiratory distress and increased 02 demands secondary to secretions (requiring multiple intubation attempts) and eventual MICU admission. While in MICU, he 02 requirements continued despite optimal vent management. Concern noted for progressive ARDS, unable to prone given morbid obesity, and ultimately cannulated for vvECMO on 2/25 and transferred to [facility] for further management on 2/26.  His hospital course at [facility] was significant for the following: diuretic and ABX management, s/p trache and PEG (placed on 3/7- PEG since removed), RCU admission, high grade fevers (secondary to panniculitis), progressively worsening sacral ulcer (likely osteomyelitis- s/p surgical debridement), BL foot wound (secondary to pressor use), neuropathy (secondary to critical illness polyneuropathy), ELLA (secondary to vancomycin toxicity and overdiuresis- since DCd- with improvement). Patient now admitted for a multidisciplinary rehab program. 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  (present on admission)   - Gait Instability, ADL impairments and Functional impairments: start Comprehensive Rehab Program of PT/OT  - 3 hours a day, 5 days a week   - PRN: Nebulizer QID     #BEA on BIPAP  --via nasal canula qhs    #Chronic AFIB  #Sinus Tachycardia  - Patient has intermittent tachycardia. Per patient, His HR  mostly above 110 even when he is not sick. follows with cardio OP.   - 5/3: EKG: NSR@94 bpm  - c/w Eliquis   - Will d/c Amlodipine and start Metoprolol tartarate 25 mg BID and monitor the response. Titrate the dose as needed   - TSH WNL 2/25  - Sinus tachycardia likely multifactorial including pain and deconditioning. Low suspicion for PE, no SOB/hypoxia and he is already on full AC    #Sacral Osteomyelitis  - Zosyn TID - completed on 5/12/25    #Paroxysmal AFIB  - Eliquis 5mg BID     #Calf Tenderness  - BL LE doppler negative 5/21     #L hip pain - resolved resolved   - no acute findings on XR or CT   - L hip MRI (5/7) no significant findings     #HTN  - Amlodipine 10mg dialy     #Sleep/Mood  - Melatonin 9mg at HS   - Quetiapine 25mg at HS     #Skin  Wound Recs per Plastic Surgery (5/16):  Sacral Pressure Injury: BID- Cleanse with NS, remove excess fluid, apply santyl ointment to sloughing tissues, pack wound with 1-inch dry plain packing, cover with 4 inch gauze and abd combine  - R buttock: BID- Cleanse with NS, overlay calcium alginate, ABD combine dressing  Additional wound care instructions:  -->Right anterior thigh to groin isolated wound: Clean wound and periwound skin with NS. Pat dry. Apply liquid barrier film to periwound skin, allow to dry. Cover with silicone foam with border. Change daily or PRN if soiled   --> Bilateral abdominal pannus, bilateral groin: Cleanse with luke-warm soap and water, dry well. Apply clotrimazole  --> Bilateral feet: Apply betadine  to bilateral foot wounds followed by 4x4 gauze, ABD pad, and matilde daily per podiatry.  --> Continue to offload pressure; bariatric low airloss support surface, turn and position per protocol with use of fluidized positioning devices, continue incontinence and moisture care per protocol and use of single breathable incontinence pads, continue to offload heels with fluidized positioning devices. Continue use of bariatric seat cushion, limit sit time- no more than 2 consecutive hours at any given time.  - Pressure injury/Skin: OOB to Chair, PT/OT    - Ammonium lactate to BL LEs     #Neuropathy  - Cymbalta 60mg daily   - Gabapentin 1200mg TID     #Pain Mgmt   - Capsaicin cream to BL feet QID - Avoid use on damaged, broken, or irritated skin. Avoid occlusive dressing or heat   - Lidocaine patch to BL thighs   - PRN:; Oxycodone and Tylenol 650mg QID     #Morbid obesity  - Most recent weight 394lbs (5/7)     #GI/Bowel Mgmt   - Pantoprazole  - Bisacodyl 10mg at HS   - Miralax   - Naloxegal 25mg daily     #/Bladder Mgmt   #ELLA  #Urinary Hesitancy  - USKB 5/20 negative   - Renvela 800mg TID  - Cr improving   - Bladder scan TID  - Flomax 0.8mg at HS     #FEN --Morbid obesity (BMI > 40).  - [facility] - Regular + Thins  [facility]    - MVI     # Health Management:   Environmental challenge affecting dc plan--narrow door and need for ramp to house entrance  However, patient making goal directed effort towards therapy goals     #Precautions / PROPHYLAXIS:   - Falls  - ortho: Weight bearing status: WBAT in surgical shoe   - Lungs: Aspiration, Incentive Spirometer   - DVT: apixaban

## 2025-05-27 LAB
ALBUMIN SERPL ELPH-MCNC: 2 G/DL — LOW (ref 3.3–5)
ALP SERPL-CCNC: 46 U/L — SIGNIFICANT CHANGE UP (ref 40–120)
ALT FLD-CCNC: 8 U/L — LOW (ref 10–45)
ANION GAP SERPL CALC-SCNC: 11 MMOL/L — SIGNIFICANT CHANGE UP (ref 5–17)
AST SERPL-CCNC: 17 U/L — SIGNIFICANT CHANGE UP (ref 10–40)
BASOPHILS # BLD AUTO: 0.02 K/UL — SIGNIFICANT CHANGE UP (ref 0–0.2)
BASOPHILS NFR BLD AUTO: 0.3 % — SIGNIFICANT CHANGE UP (ref 0–2)
BILIRUB SERPL-MCNC: 0.4 MG/DL — SIGNIFICANT CHANGE UP (ref 0.2–1.2)
BUN SERPL-MCNC: 9 MG/DL — SIGNIFICANT CHANGE UP (ref 7–23)
CALCIUM SERPL-MCNC: 9.3 MG/DL — SIGNIFICANT CHANGE UP (ref 8.4–10.5)
CHLORIDE SERPL-SCNC: 101 MMOL/L — SIGNIFICANT CHANGE UP (ref 96–108)
CO2 SERPL-SCNC: 30 MMOL/L — SIGNIFICANT CHANGE UP (ref 22–31)
CREAT SERPL-MCNC: 1.01 MG/DL — SIGNIFICANT CHANGE UP (ref 0.5–1.3)
EGFR: 98 ML/MIN/1.73M2 — SIGNIFICANT CHANGE UP
EGFR: 98 ML/MIN/1.73M2 — SIGNIFICANT CHANGE UP
EOSINOPHIL # BLD AUTO: 0.27 K/UL — SIGNIFICANT CHANGE UP (ref 0–0.5)
EOSINOPHIL NFR BLD AUTO: 3.5 % — SIGNIFICANT CHANGE UP (ref 0–6)
GLUCOSE SERPL-MCNC: 77 MG/DL — SIGNIFICANT CHANGE UP (ref 70–99)
HCT VFR BLD CALC: 30.1 % — LOW (ref 39–50)
HGB BLD-MCNC: 8.5 G/DL — LOW (ref 13–17)
IMM GRANULOCYTES NFR BLD AUTO: 0.5 % — SIGNIFICANT CHANGE UP (ref 0–0.9)
LYMPHOCYTES # BLD AUTO: 1.89 K/UL — SIGNIFICANT CHANGE UP (ref 1–3.3)
LYMPHOCYTES # BLD AUTO: 24.5 % — SIGNIFICANT CHANGE UP (ref 13–44)
MCHC RBC-ENTMCNC: 24.2 PG — LOW (ref 27–34)
MCHC RBC-ENTMCNC: 28.2 G/DL — LOW (ref 32–36)
MCV RBC AUTO: 85.8 FL — SIGNIFICANT CHANGE UP (ref 80–100)
MONOCYTES # BLD AUTO: 1.08 K/UL — HIGH (ref 0–0.9)
MONOCYTES NFR BLD AUTO: 14 % — SIGNIFICANT CHANGE UP (ref 2–14)
NEUTROPHILS # BLD AUTO: 4.41 K/UL — SIGNIFICANT CHANGE UP (ref 1.8–7.4)
NEUTROPHILS NFR BLD AUTO: 57.2 % — SIGNIFICANT CHANGE UP (ref 43–77)
NRBC BLD AUTO-RTO: 0 /100 WBCS — SIGNIFICANT CHANGE UP (ref 0–0)
PLATELET # BLD AUTO: 417 K/UL — HIGH (ref 150–400)
POTASSIUM SERPL-MCNC: 3.5 MMOL/L — SIGNIFICANT CHANGE UP (ref 3.5–5.3)
POTASSIUM SERPL-SCNC: 3.5 MMOL/L — SIGNIFICANT CHANGE UP (ref 3.5–5.3)
PROT SERPL-MCNC: 7 G/DL — SIGNIFICANT CHANGE UP (ref 6–8.3)
RBC # BLD: 3.51 M/UL — LOW (ref 4.2–5.8)
RBC # FLD: 18.3 % — HIGH (ref 10.3–14.5)
SODIUM SERPL-SCNC: 142 MMOL/L — SIGNIFICANT CHANGE UP (ref 135–145)
WBC # BLD: 7.71 K/UL — SIGNIFICANT CHANGE UP (ref 3.8–10.5)
WBC # FLD AUTO: 7.71 K/UL — SIGNIFICANT CHANGE UP (ref 3.8–10.5)

## 2025-05-27 PROCEDURE — 99232 SBSQ HOSP IP/OBS MODERATE 35: CPT

## 2025-05-27 RX ADMIN — Medication 9 MILLIGRAM(S): at 21:41

## 2025-05-27 RX ADMIN — Medication 220 MILLIGRAM(S): at 11:42

## 2025-05-27 RX ADMIN — Medication 1 APPLICATION(S): at 11:46

## 2025-05-27 RX ADMIN — SEVELAMER HYDROCHLORIDE 800 MILLIGRAM(S): 800 TABLET ORAL at 09:10

## 2025-05-27 RX ADMIN — Medication 1 APPLICATION(S): at 07:01

## 2025-05-27 RX ADMIN — PREGABALIN 25 MILLIGRAM(S): 50 CAPSULE ORAL at 06:59

## 2025-05-27 RX ADMIN — MUPIROCIN CALCIUM 1 APPLICATION(S): 20 CREAM TOPICAL at 19:06

## 2025-05-27 RX ADMIN — Medication 40 MILLIGRAM(S): at 07:00

## 2025-05-27 RX ADMIN — OXYCODONE HYDROCHLORIDE 15 MILLIGRAM(S): 30 TABLET ORAL at 19:36

## 2025-05-27 RX ADMIN — OXYCODONE HYDROCHLORIDE 15 MILLIGRAM(S): 30 TABLET ORAL at 11:44

## 2025-05-27 RX ADMIN — OXYCODONE HYDROCHLORIDE 15 MILLIGRAM(S): 30 TABLET ORAL at 12:30

## 2025-05-27 RX ADMIN — Medication 10 MILLIGRAM(S): at 21:39

## 2025-05-27 RX ADMIN — SEVELAMER HYDROCHLORIDE 800 MILLIGRAM(S): 800 TABLET ORAL at 11:42

## 2025-05-27 RX ADMIN — OXYCODONE HYDROCHLORIDE 20 MILLIGRAM(S): 30 TABLET ORAL at 06:59

## 2025-05-27 RX ADMIN — PREGABALIN 25 MILLIGRAM(S): 50 CAPSULE ORAL at 18:59

## 2025-05-27 RX ADMIN — OXYCODONE HYDROCHLORIDE 15 MILLIGRAM(S): 30 TABLET ORAL at 18:59

## 2025-05-27 RX ADMIN — GABAPENTIN 1200 MILLIGRAM(S): 400 CAPSULE ORAL at 06:59

## 2025-05-27 RX ADMIN — Medication 400 MILLIGRAM(S): at 18:59

## 2025-05-27 RX ADMIN — APIXABAN 5 MILLIGRAM(S): 2.5 TABLET, FILM COATED ORAL at 07:00

## 2025-05-27 RX ADMIN — SODIUM HYPOCHLORITE 1 APPLICATION(S): 0.12 SOLUTION TOPICAL at 19:05

## 2025-05-27 RX ADMIN — DULOXETINE 60 MILLIGRAM(S): 20 CAPSULE, DELAYED RELEASE ORAL at 11:42

## 2025-05-27 RX ADMIN — MUPIROCIN CALCIUM 1 APPLICATION(S): 20 CREAM TOPICAL at 07:02

## 2025-05-27 RX ADMIN — Medication 1 TABLET(S): at 11:43

## 2025-05-27 RX ADMIN — APIXABAN 5 MILLIGRAM(S): 2.5 TABLET, FILM COATED ORAL at 18:59

## 2025-05-27 RX ADMIN — OXYCODONE HYDROCHLORIDE 20 MILLIGRAM(S): 30 TABLET ORAL at 21:41

## 2025-05-27 RX ADMIN — GABAPENTIN 1200 MILLIGRAM(S): 400 CAPSULE ORAL at 21:42

## 2025-05-27 RX ADMIN — QUETIAPINE FUMARATE 25 MILLIGRAM(S): 25 TABLET ORAL at 21:39

## 2025-05-27 RX ADMIN — SEVELAMER HYDROCHLORIDE 800 MILLIGRAM(S): 800 TABLET ORAL at 19:00

## 2025-05-27 RX ADMIN — NALOXEGOL OXALATE 25 MILLIGRAM(S): 12.5 TABLET, FILM COATED ORAL at 11:43

## 2025-05-27 RX ADMIN — TAMSULOSIN HYDROCHLORIDE 0.8 MILLIGRAM(S): 0.4 CAPSULE ORAL at 21:39

## 2025-05-27 RX ADMIN — Medication 1 APPLICATION(S): at 19:05

## 2025-05-27 RX ADMIN — METOPROLOL SUCCINATE 50 MILLIGRAM(S): 50 TABLET, EXTENDED RELEASE ORAL at 07:00

## 2025-05-27 RX ADMIN — Medication 400 MILLIGRAM(S): at 11:42

## 2025-05-27 RX ADMIN — Medication 1 APPLICATION(S): at 19:06

## 2025-05-27 RX ADMIN — Medication 1 APPLICATION(S): at 07:02

## 2025-05-27 RX ADMIN — Medication 400 MILLIGRAM(S): at 09:10

## 2025-05-27 RX ADMIN — OXYCODONE HYDROCHLORIDE 20 MILLIGRAM(S): 30 TABLET ORAL at 22:41

## 2025-05-27 RX ADMIN — Medication 1 APPLICATION(S): at 11:47

## 2025-05-27 RX ADMIN — CLOTRIMAZOLE 1 APPLICATION(S): 1 CREAM TOPICAL at 11:47

## 2025-05-27 RX ADMIN — Medication 500 MILLIGRAM(S): at 11:43

## 2025-05-27 RX ADMIN — METOPROLOL SUCCINATE 50 MILLIGRAM(S): 50 TABLET, EXTENDED RELEASE ORAL at 18:59

## 2025-05-27 RX ADMIN — GABAPENTIN 1200 MILLIGRAM(S): 400 CAPSULE ORAL at 13:15

## 2025-05-27 NOTE — PROGRESS NOTE ADULT - ASSESSMENT
39 year old male with PMH of morbid obesity, BEA on CPAP, pAFIB (not on AC prior to admission), HTN, and BL papilledema attributed to pseudotumor cerebri; who initially presented to  on 2/24 with SOB and BL LE edema. Found to have URI with progressive SOB and multifocal PNA on imaging. His hospital course was complicated by worsening respiratory distress and increased 02 demands secondary to secretions (requiring multiple intubation attempts) and eventual MICU admission. While in MICU, he 02 requirements continued despite optimal vent management. Concern noted for progressive ARDS, unable to prone given morbid obesity, and ultimately cannulated for vvECMO on 2/25 and transferred to [facility] for further management on 2/26.  His hospital course at [facility] was significant for the following: diuretic and ABX management, s/p trache and PEG (placed on 3/7- PEG since removed), RCU admission, high grade fevers (secondary to panniculitis), progressively worsening sacral ulcer (likely osteomyelitis- s/p surgical debridement), BL foot wound (secondary to pressor use), neuropathy (secondary to critical illness polyneuropathy), ELLA (secondary to vancomycin toxicity and overdiuresis- since DCd- with improvement). Patient now admitted for a multidisciplinary rehab program. 04-25-25 @ 15:19    * Therapy progress - Wt bearing on both legs limited by pain around areas with foot ulcer (sole of feet)   * Pain both feet- continue current analgesic regimen - await offloading shoes from orthotics   * Hold therapy today due to significant sacral pressure injury pain     # Critical illness myopathy due to respiratory failure and sepsis  (present on admission)   - Gait Instability, ADL impairments and Functional impairments: start Comprehensive Rehab Program of PT/OT  - 3 hours a day, 5 days a week   - PRN: Nebulizer QID     #BEA on BIPAP  --via nasal canula qhs    #Sacral Osteomyelitis  - Zosyn TID - completed on 5/12/25    #Paroxysmal AFIB  - Eliquis 5mg BID     #Calf Tenderness  - BL LE doppler negative 5/21     #L hip pain - resolved resolved   - no acute findings on XR or CT   - L hip MRI (5/7) no significant findings     #HTN  - Amlodipine 10mg dialy     #Sleep/Mood  - Melatonin 9mg at HS   - Quetiapine 25mg at HS     #Skin  Wound Recs per Plastic Surgery (5/16):  Sacral Pressure Injury: BID- Cleanse with NS, remove excess fluid, apply santyl ointment to sloughing tissues, pack wound with 1-inch dry plain packing, cover with 4 inch gauze and abd combine  - R buttock: BID- Cleanse with NS, overlay calcium alginate, ABD combine dressing  Additional wound care instructions:  -->Right anterior thigh to groin isolated wound: Clean wound and periwound skin with NS. Pat dry. Apply liquid barrier film to periwound skin, allow to dry. Cover with silicone foam with border. Change daily or PRN if soiled   --> Bilateral abdominal pannus, bilateral groin: Cleanse with luke-warm soap and water, dry well. Apply clotrimazole  --> Bilateral feet: Apply betadine  to bilateral foot wounds followed by 4x4 gauze, ABD pad, and matilde daily per podiatry.  --> Continue to offload pressure; bariatric low airloss support surface, turn and position per protocol with use of fluidized positioning devices, continue incontinence and moisture care per protocol and use of single breathable incontinence pads, continue to offload heels with fluidized positioning devices. Continue use of bariatric seat cushion, limit sit time- no more than 2 consecutive hours at any given time.  - Pressure injury/Skin: OOB to Chair, PT/OT    - Ammonium lactate to BL LEs     #Neuropathy  - Cymbalta 60mg daily   - Gabapentin 1200mg TID     #Pain Mgmt   - Capsaicin cream to BL feet QID - Avoid use on damaged, broken, or irritated skin. Avoid occlusive dressing or heat   - Lidocaine patch to BL thighs   - PRN:; Oxycodone and Tylenol 650mg QID     #Morbid obesity  - Most recent weight 394lbs (5/7)     #GI/Bowel Mgmt   - Pantoprazole  - Bisacodyl 10mg at HS   - Miralax   - Naloxegal 25mg daily     #/Bladder Mgmt   #ELLA  #Urinary Hesitancy  - USKB 5/20 negative   - Renvela 800mg TID  - Cr improving   - Bladder scan TID  - Flomax 0.8mg at HS     #FEN --Morbid obesity (BMI > 40).  - Diet - Regular + Thins  [CCHO]    - MVI     # Health Management:   Environmental challenge affecting dc plan--narrow door and need for ramp to house entrance  However, patient making goal directed effort towards therapy goals     #Precautions / PROPHYLAXIS:   - Falls  - ortho: Weight bearing status: WBAT in surgical shoe   - Lungs: Aspiration, Incentive Spirometer   - DVT: Lovenox  ----------------------------------------------  IDT 5/13  Stand pivot T/F during shower max x 2 persons  Urinary hesitancy noted  Pain b/l feet over areas with skin break on dorsum of foot, limiting wt bearing  Most transfers still  Sit to stand mod assist x 1 T/f bed to  Bussy board mod assist x 1  Took 4 steps on parallel bars with mo assist x 2  Making progress towards therapy goals  Barrier--ulcers on feet, limiting wt bearing, urinary hesitancy, environmental barrier at home (stairs), severe obesity  LE weakness  Overall making functional progress, has supportive family,  hence decision to extend DC date and achieve same home dc   Ext 5/30 revised to 6/6  (barriers as noted, making functional progress)     ----------------------------------------------  Dr. CANTU's Liaison with Family/Providers:  5/22--Patient's partner at bedside, gets regular updates on patient's clinical and functional progress  5/2--D/w orthotist Mr Dawn, consult for pressure offloading shoes both legs   ----------------------------------------------  OUTPATIENT/FOLLOW UP:    Your Primary Care Provider,   Phone: (   )    -  Fax: (   )    -  Follow Up Time: 1 week    [facility],   [address] [place] [address]  Phone: [phone]  Fax: (   )    -  Follow Up Time:    Dr. Waterhouse  Podiatry  [phone]  Within 1 week       39 year old male with PMH of morbid obesity, BEA on CPAP, pAFIB (not on AC prior to admission), HTN, and BL papilledema attributed to pseudotumor cerebri; who initially presented to  on 2/24 with SOB and BL LE edema. Found to have URI with progressive SOB and multifocal PNA on imaging. His hospital course was complicated by worsening respiratory distress and increased 02 demands secondary to secretions (requiring multiple intubation attempts) and eventual MICU admission. While in MICU, he 02 requirements continued despite optimal vent management. Concern noted for progressive ARDS, unable to prone given morbid obesity, and ultimately cannulated for vvECMO on 2/25 and transferred to [facility] for further management on 2/26.  His hospital course at [facility] was significant for the following: diuretic and ABX management, s/p trache and PEG (placed on 3/7- PEG since removed), RCU admission, high grade fevers (secondary to panniculitis), progressively worsening sacral ulcer (likely osteomyelitis- s/p surgical debridement), BL foot wound (secondary to pressor use), neuropathy (secondary to critical illness polyneuropathy), ELLA (secondary to vancomycin toxicity and overdiuresis- since DCd- with improvement). Patient now admitted for a multidisciplinary rehab program. 04-25-25 @ 15:19    * Therapy progress - Wt bearing on both legs limited by pain around areas with foot ulcer (sole of feet)   * Pain both feet- continue current analgesic regimen - await offloading shoes from orthotics   * Hold therapy today due to significant sacral pressure injury pain     # Critical illness myopathy due to respiratory failure and sepsis  (present on admission)   - Gait Instability, ADL impairments and Functional impairments: start Comprehensive Rehab Program of PT/OT  - 3 hours a day, 5 days a week   - PRN: Nebulizer QID     #BEA on BIPAP  --via nasal canula qhs    #Sacral Osteomyelitis  - Zosyn TID - completed on 5/12/25    #Paroxysmal AFIB  - Eliquis 5mg BID     #Calf Tenderness  - BL LE doppler negative 5/21     #L hip pain - resolved resolved   - no acute findings on XR or CT   - L hip MRI (5/7) no significant findings     #HTN  - Amlodipine 10mg dialy     #Sleep/Mood  - Melatonin 9mg at HS   - Quetiapine 25mg at HS     #Skin  Wound Recs per Plastic Surgery (5/16):  Sacral Pressure Injury: BID- Cleanse with NS, remove excess fluid, apply santyl ointment to sloughing tissues, pack wound with 1-inch dry plain packing, cover with 4 inch gauze and abd combine  - R buttock: BID- Cleanse with NS, overlay calcium alginate, ABD combine dressing  Additional wound care instructions:  -->Right anterior thigh to groin isolated wound: Clean wound and periwound skin with NS. Pat dry. Apply liquid barrier film to periwound skin, allow to dry. Cover with silicone foam with border. Change daily or PRN if soiled   --> Bilateral abdominal pannus, bilateral groin: Cleanse with luke-warm soap and water, dry well. Apply clotrimazole  --> Bilateral feet: Apply betadine  to bilateral foot wounds followed by 4x4 gauze, ABD pad, and matilde daily per podiatry.  --> Continue to offload pressure; bariatric low airloss support surface, turn and position per protocol with use of fluidized positioning devices, continue incontinence and moisture care per protocol and use of single breathable incontinence pads, continue to offload heels with fluidized positioning devices. Continue use of bariatric seat cushion, limit sit time- no more than 2 consecutive hours at any given time.  - Pressure injury/Skin: OOB to Chair, PT/OT    - Ammonium lactate to BL LEs     #Neuropathy  - Cymbalta 60mg daily   - Gabapentin 1200mg TID     #Pain Mgmt   - Capsaicin cream to BL feet QID - Avoid use on damaged, broken, or irritated skin. Avoid occlusive dressing or heat   - Lidocaine patch to BL thighs   - PRN:; Oxycodone and Tylenol 650mg QID     #Morbid obesity  - Most recent weight 394lbs (5/7)     #GI/Bowel Mgmt   - Pantoprazole  - Bisacodyl 10mg at HS   - Miralax   - Naloxegal 25mg daily     #/Bladder Mgmt   #ELLA  #Urinary Hesitancy  - USKB 5/20 negative   - Renvela 800mg TID  - Cr improving   - Bladder scan TID  - Flomax 0.8mg at HS     #FEN --Morbid obesity (BMI > 40).  - Diet - Regular + Thins  [CCHO]    - MVI     # Health Management:   Environmental challenge affecting dc plan--narrow door and need for ramp to house entrance  However, patient making goal directed effort towards therapy goals     #Precautions / PROPHYLAXIS:   - Falls  - ortho: Weight bearing status: WBAT in surgical shoe   - Lungs: Aspiration, Incentive Spirometer   - DVT: Lovenox  ----------------------------------------------  IDT 5/27 5/27--Therapy held today due to pain over sacral wound    Function as at 5/23  Stand pivot T/F during shower max x 2 persons  Pain b/l feet over areas with skin break on dorsum of foot, limiting wt bearing  Most transfers still  Sit to stand mod assist x 1 T/f bed to  Bussy board mod assist x 1  Took 4 steps on parallel bars with mo assist x 2  Making progress towards therapy goals  Barrier--ulcers on feet, limiting wt bearing, urinary hesitancy, environmental barrier at home (stairs), severe obesity  LE weakness  Overall making functional progress, has supportive family,  hence decision to extend DC date and achieve same home dc   Ext dc 6/6  (barriers as noted, making functional progress)  will discuss Home vs [facility] placement considering multiple issues--environmental barrier at home, large body habitus, difficulty with transfers    ----------------------------------------------  Dr. CANTU's Liaison with Family/Providers:  5/22--Patient's partner at bedside, gets regular updates on patient's clinical and functional progress  5/2--D/w orthotist Mr Dawn, consult for pressure offloading shoes both legs   ----------------------------------------------  OUTPATIENT/FOLLOW UP:    Your Primary Care Provider,   Phone: (   )    -  Fax: (   )    -  Follow Up Time: 1 week    [facility],   [address] [place] [address]  Phone: [phone]  Fax: (   )    -  Follow Up Time:    Dr. Waterhouse  Podiatry  [phone]  Within 1 week

## 2025-05-27 NOTE — PROGRESS NOTE ADULT - ASSESSMENT
38 y/o M with PMH of morbid obesity, BEA, pAFIB (not on AC), HTN, and BL papilledema attributed to pseudotumor cerebri; who initially presented to  on 2/24 with SOB and BL LE edema. Found to have URI with progressive SOB and multifocal PNA on imaging. His hospital course was complicated by worsening respiratory distress and increased O2 demands secondary to secretions (requiring multiple intubation attempts) and eventual MICU admission. While in MICU, his O2 requirements continued despite optimal vent management. Concern noted for progressive ARDS, unable to prone given morbid obesity, and ultimately cannulated for vvECMO on 2/25 and transferred to [facility] for further management on 2/26. His hospital course at [facility] was significant for the following: diuretic and ABX management, s/p trach and PEG (placed on 3/7- PEG since removed), RCU admission, high grade fevers (secondary to panniculitis), progressively worsening sacral ulcer (likely osteomyelitis- s/p surgical debridement), BL foot wound (secondary to pressor use), neuropathy (secondary to critical illness polyneuropathy), ELLA (secondary to vancomycin toxicity and overdiuresis- since DCd- with improvement). Patient now admitted for a multidisciplinary rehab program. 04-25-25     #Left hip pain - Improving  -Hip xray--> no fracture  -CT hip: Again seen is a sacral decubitus wound which extends superficial as well as involving the right gluteus musculature and the posterior L5 with small amount of fluid and gas within the tract. Limited involvement on this noncontrast CT. This likely corresponds to adjacent phlegmonous changes  -MRI left hip: Incompletely characterized sacral wound with associated ill-defined collection of fluid and soft tissue gas which extends to/involve the right gluteus zurdo muscle as at CT pelvis study from one day prior. No interval change in the high STIR signal within the bilateral gluteal and thigh musculature suggestive of a myositis as compared to prior MRI study. No fracture, osseous destruction or aggressive periosteal reaction. No osteomyelitis at the left hip. Edema-like signal along the right and left greater trochanteric regions, overall increased as compared to the prior MRI study from 4/11/2025. Findings are felt to be reactive in etiology..  -Continue comprehensive rehab program - PT/OT/SLP per rehab team  -Pain management, bowel regimen per rehab     #Abnormal CT A/P:   -5/2: CT A/P: Question of pneumatosis in the cecum with no other evidence of bowel ischemia. Correlation with lactate levels and clinical exam would be helpful.  -Patient has no symptoms. Abdomen is benign. Tolerating PO  -5/2: Surgery cx noted: no intervention. c/w PO. refer to bariatric surgery post-DC dr botello at [facility] or Dr. Segovia at [facility] [per patient request]  -5/3 GI cx noted: no GI intervention planned. c/w PO    #Stage IV Sacral Decub Ulcer  #Bilateral Buttocks Wounds  #Bilateral Foot Wounds  -5/2 CT A/P: An air and fluid containing collection in the right gluteus zurdo muscle in continuity with a subcutaneous sacral collection. No obvious skin defect to suggest a decubitus ulcer.  -5/7: MRI left hip: Incompletely characterized sacral wound with associated ill-defined collection of fluid and soft tissue gas which extends to/involve the right gluteus zurdo muscle as at CT pelvis study from one day prior. No interval change in the high STIR signal within the bilateral gluteal and thigh musculature suggestive of a myositis as compared to prior MRI study. No fracture, osseous destruction or aggressive periosteal reaction. No osteomyelitis at the left hip. Edema-like signal along the right and left greater trochanteric regions, overall increased as compared to the prior MRI study from 4/11/2025. Findings are felt to be reactive in etiology..  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 RV is not well visualized, enlarged RV cavity size a/ reduced RV systolic function. No significant valvular disease.  No echocardiographic evidence of pulmonary hypertension  -s/p Trach (decannulated 3/28) and PEG (removed 4/15)   -Saturating well on RA  -Continue duoneb prn  -BIPAP qhs (FiO2 40%, 18/10) via nasal mask   -Fall precaution    #p-AFIB  #Sinus Tachycardia  -Patient has intermittent tachycardia. Per patient, His HR  mostly above 110 even when he is not sick. follows with cardio OP  -5/3: EKG: NSR@94 bpm  -Eliquis   -Off norvasc  -Continue metoprolol tartarate 50mg BID  -TSH WNL 2/25  -Sinus tachycardia likely multifactorial including pain and deconditioning. Low suspicion for PE, no SOB/hypoxia and he is already on full AC    #RIJ and Bilateral LE DVT  -Duplex RUE 3/14 showed Acute, non-occlusive deep vein thrombosis noted within the right internal jugular vein. Superficial vein thrombosis noted within the right antecubital cephalic vein  -Duplex LE 3/14 showed Acute, occlusive deep vein thromboses noted within the right and left peroneal veins at both mid calves. Age-indeterminate, occlusive deep vein thrombosis visualized within the left gastrocnemius vein at the proximal calf.  -Continue eliquis  -Repeat duplex 4/29 showed No evidence of deep venous thrombosis in either lower extremity.    #Normocytic Anemia   -Likely multifactorial  -H/H stable, no evidence of active bleed   -Monitor CBC     #HTN  -Amlodipine d/melly  -Metoprolol tartrate 50mg BID   -Monitor BP     #History of Pseudotumor Cerebri   -Outpatient follow up     #ELLA - resolved  -Baseline Cr appears to be ~0.8 range   -ELLA felt to be multifactorial in setting of cardiorenal w/ RVE, recurrent ELLA suspected due to vancomycin toxicity and diuresis   -Continue to encourage oral hydration   -Renvela   -Low phos diet  -Monitor BMP and phos level     #Type 2 Diabetes  -HbA1C 6.7 on 2/25, Repeat HbA1C 4.9 on 4/28  -FS trend reviewed, has been within goal  -Monitor glucose on routine lab, controlled     #Urinary Retention  -Flomax 5/1  -Renal u/s 5/20 unremarkable    #Mood  -Continue Seroquel    #DVT ppx - Eliquis  #GI ppx - PPI

## 2025-05-27 NOTE — PROGRESS NOTE ADULT - SUBJECTIVE AND OBJECTIVE BOX
Subjective  Patient seen and examined this AM.  Admits to poor sleep overnight. Noted with bed malfunction over the weekend, worsening his sacral pain due to lying on hard surface.   Experiencing sole of foot pain over the areas with skin break--distal sole of foot with weight bearing and during therapy sessions.   Awaiting offloading shoes from Orthotist.    ROS--no acute pain, no fever or chest pain   B/l leg and sacral ulcers  LBM 5/25    Function    Ambulated 25', 15', 8' with bariatric RW needing MIN/MOD A x 2 and wheelchair  follow. Continues to use modified step to 3 point gait pattern (leading with  Right LE) and excessive offloading with UEs on device due to bilateral foot  pain.  -Distances limited by overall fatigue, UE fatigue, and worsening pain to feet  -Extended rest breaks provided to allow adequate recovery time between walking  trials.    Therapeutic Activity  -Sit to/from stand with bariatric RW needing MOD A x 2. Verbal cuing for proper  foot placement under wheelchair prior to standing attempt and anterior  rocking/weight shifting. Multiple sit to stands performed throughout gait  training.  -Patient returned to bed at end of session with use of overhead Nette system    Vital Signs Last 24 Hrs  T(C): 37.2 (26 May 2025 20:39), Max: 37.2 (26 May 2025 20:39)  T(F): 98.9 (26 May 2025 20:39), Max: 98.9 (26 May 2025 20:39)  HR: 93 (27 May 2025 06:55) (93 - 99)  BP: 132/72 (27 May 2025 06:55) (104/64 - 132/72)  BP(mean): --  RR: 16 (27 May 2025 06:55) (16 - 16)  SpO2: 95% (27 May 2025 06:55) (95% - 98%)      EXAM  Gen - Comfortable,   HEENT - NAD  Neck - Supple, No limited ROM  Pulm - Clear  Cardiovascular - RRR, S1S2, No murmurs  Abdomen - Soft, non tender, +BS  Extremities - chronic skin changes with hyperpigmentation at shin      Neuro-  AAO X 3, Speech is normal     Motor - UE 5/5                    LEFT    LE - HF 3/5, KE 3/5 ankle 2/5                    RIGHT LE - HF 3/5, KE 3/5 ankle 2/5      Sensory - Decreased to light touch bilateral lower extremities      Coordination - impaired lower extremities   Psychiatric - Mood stable, Affect WNL    Skin:  R foot plantar aspect lateral/distal side 7cm x 5.5cm ischemic wound from pressors  RLE 4th digit 1x1.5cm ischemic induced wound from pressors  RLE 5th digit 3x2cm ischemic induced wound from pressors  L foot plantar aspect lateral/distal side 6x7cm ischemic induced wound from pressor usage  LLE 4th digit 2x1cm ischemic induced wound from pressors  LLE 5th digit 2.5x3cm ischemic induced wound from pressors   R groin skin tear 1x1.5cm  Unstageable pressure wound cocyx 7cmx4.5cm w/ 2.5cm depth  R buttock pressure induced wound semi contiguous with coccyx wound calling unstageable given prior debridement 8.5x5.5cm  L buttock 1.0x0.5cm stage 3 pressure injury  R buttock skin tear 3.5cm x 0.5cm and 1.5cmx0.5cm  Dry skin with callus on sole of foot     MMS--antigravity upper extremities    LABS:                        8.5    7.71  )-----------( 417      ( 27 May 2025 06:30 )             30.1     05-27    142  |  101  |  9   ----------------------------<  77  3.5   |  30  |  1.01    Ca    9.3      27 May 2025 06:30    TPro  7.0  /  Alb  2.0[L]  /  TBili  0.4  /  DBili  x   /  AST  17  /  ALT  8[L]  /  AlkPhos  46  05-27      Urinalysis Basic - ( 27 May 2025 06:30 )    Color: x / Appearance: x / SG: x / pH: x  Gluc: 77 mg/dL / Ketone: x  / Bili: x / Urobili: x   Blood: x / Protein: x / Nitrite: x   Leuk Esterase: x / RBC: x / WBC x   Sq Epi: x / Non Sq Epi: x / Bacteria: x        < from: US Duplex Venous Lower Ext Complete, Bilateral (05.21.25 @ 19:48) >  PROCEDURE DATE:  05/21/2025    INTERPRETATION:  CLINICAL INFORMATION: Calf tenderness  COMPARISON: 4/29/2025.    IMPRESSION:  Technically difficult study. No evidence of DVT bilateral lower   extremities as visualized.    MEDICATIONS  (STANDING):  ammonium lactate 12% Lotion 1 Application(s) Topical two times a day  apixaban 5 milliGRAM(s) Oral two times a day  ascorbic acid 500 milliGRAM(s) Oral daily  bisacodyl 10 milliGRAM(s) Oral at bedtime  capsaicin 0.025% Cream 1 Application(s) Topical four times a day  clotrimazole 1% Cream 1 Application(s) Topical <User Schedule>  collagenase Ointment 1 Application(s) Topical two times a day  Dakins Solution - 1/4 Strength 1 Application(s) Topical two times a day  dextrose 5%. 1000 milliLiter(s) (100 mL/Hr) IV Continuous <Continuous>  dextrose 5%. 1000 milliLiter(s) (50 mL/Hr) IV Continuous <Continuous>  dextrose 50% Injectable 25 Gram(s) IV Push once  dextrose 50% Injectable 12.5 Gram(s) IV Push once  dextrose 50% Injectable 25 Gram(s) IV Push once  DULoxetine 60 milliGRAM(s) Oral daily  gabapentin 1200 milliGRAM(s) Oral every 8 hours  glucagon  Injectable 1 milliGRAM(s) IntraMuscular once  influenza   Vaccine 0.5 milliLiter(s) IntraMuscular once  lidocaine   4% Patch 1 Patch Transdermal daily  lidocaine   4% Patch 1 Patch Transdermal daily  magnesium oxide 400 milliGRAM(s) Oral three times a day with meals  melatonin 9 milliGRAM(s) Oral at bedtime  metoprolol tartrate 50 milliGRAM(s) Oral two times a day  multivitamin 1 Tablet(s) Oral daily  mupirocin 2% Ointment 1 Application(s) Topical two times a day  naloxegol 25 milliGRAM(s) Oral daily  pantoprazole    Tablet 40 milliGRAM(s) Oral before breakfast  petrolatum white Ointment 1 Application(s) Topical every 8 hours  polyethylene glycol 3350 17 Gram(s) Oral two times a day  povidone iodine 10% Solution 1 Application(s) Topical daily  pregabalin 25 milliGRAM(s) Oral two times a day  QUEtiapine 25 milliGRAM(s) Oral at bedtime  sevelamer carbonate 800 milliGRAM(s) Oral three times a day with meals  silver nitrate Applicator 6 Application(s) Topical once  tamsulosin 0.8 milliGRAM(s) Oral at bedtime  zinc sulfate 220 milliGRAM(s) Oral daily    MEDICATIONS  (PRN):  albuterol/ipratropium for Nebulization 3 milliLiter(s) Nebulizer every 6 hours PRN Shortness of Breath and/or Wheezing  dextrose Oral Gel 15 Gram(s) Oral once PRN Blood Glucose LESS THAN 70 milliGRAM(s)/deciliter  oxyCODONE    IR 20 milliGRAM(s) Oral every 6 hours PRN Severe Pain (7 - 10)  oxyCODONE    IR 15 milliGRAM(s) Oral two times a day PRN Moderate Pain (4 - 6)  oxyCODONE    IR 10 milliGRAM(s) Oral two times a day PRN Mild Pain (1 - 3)     Subjective  Patient seen and examined this AM.  Admits to poor sleep overnight. Noted with bed malfunction over the weekend, worsening his sacral pain due to lying on hard surface.   Experiencing sole of foot pain over the areas with skin break--distal sole of foot with weight bearing and during therapy sessions.   Awaiting offloading shoes from Orthotist.    ROS--no acute pain, no fever or chest pain   B/l leg and sacral ulcers  LBM 5/25    Function  5/27--Therapy held due to severe pain over sacral wound    5/23 Ambulated 25', 15', 8' with bariatric RW needing MIN/MOD A x 2 and wheelchair  follow. Continues to use modified step to 3 point gait pattern (leading with  Right LE) and excessive offloading with UEs on device due to bilateral foot  pain.  -Distances limited by overall fatigue, UE fatigue, and worsening pain to feet  -Extended rest breaks provided to allow adequate recovery time between walking  trials.    Therapeutic Activity  -Sit to/from stand with bariatric RW needing MOD A x 2. Verbal cuing for proper  foot placement under wheelchair prior to standing attempt and anterior  rocking/weight shifting. Multiple sit to stands performed throughout gait  training.  -Patient returned to bed at end of session with use of overhead Nette system    Vital Signs Last 24 Hrs  T(C): 37.2 (26 May 2025 20:39), Max: 37.2 (26 May 2025 20:39)  T(F): 98.9 (26 May 2025 20:39), Max: 98.9 (26 May 2025 20:39)  HR: 93 (27 May 2025 06:55) (93 - 99)  BP: 132/72 (27 May 2025 06:55) (104/64 - 132/72)  BP(mean): --  RR: 16 (27 May 2025 06:55) (16 - 16)  SpO2: 95% (27 May 2025 06:55) (95% - 98%)      EXAM  Gen - Comfortable,   HEENT - NAD  Neck - Supple, No limited ROM  Pulm - Clear  Cardiovascular - RRR, S1S2, No murmurs  Abdomen - Soft, non tender, +BS  Extremities - chronic skin changes with hyperpigmentation at shin      Neuro-  AAO X 3, Speech is normal     Motor - UE 5/5                    LEFT    LE - HF 3/5, KE 3/5 ankle 2/5                    RIGHT LE - HF 3/5, KE 3/5 ankle 2/5      Sensory - Decreased to light touch bilateral lower extremities      Coordination - impaired lower extremities   Psychiatric - Mood stable, Affect WNL    Skin:  R foot plantar aspect lateral/distal side 7cm x 5.5cm ischemic wound from pressors  RLE 4th digit 1x1.5cm ischemic induced wound from pressors  RLE 5th digit 3x2cm ischemic induced wound from pressors  L foot plantar aspect lateral/distal side 6x7cm ischemic induced wound from pressor usage  LLE 4th digit 2x1cm ischemic induced wound from pressors  LLE 5th digit 2.5x3cm ischemic induced wound from pressors   R groin skin tear 1x1.5cm  Unstageable pressure wound cocyx 7cmx4.5cm w/ 2.5cm depth  R buttock pressure induced wound semi contiguous with coccyx wound calling unstageable given prior debridement 8.5x5.5cm  L buttock 1.0x0.5cm stage 3 pressure injury  R buttock skin tear 3.5cm x 0.5cm and 1.5cmx0.5cm  Dry skin with callus on sole of foot     MMS--antigravity upper extremities    LABS:                        8.5    7.71  )-----------( 417      ( 27 May 2025 06:30 )             30.1     05-27    142  |  101  |  9   ----------------------------<  77  3.5   |  30  |  1.01    Ca    9.3      27 May 2025 06:30    TPro  7.0  /  Alb  2.0[L]  /  TBili  0.4  /  DBili  x   /  AST  17  /  ALT  8[L]  /  AlkPhos  46  05-27      Urinalysis Basic - ( 27 May 2025 06:30 )    Color: x / Appearance: x / SG: x / pH: x  Gluc: 77 mg/dL / Ketone: x  / Bili: x / Urobili: x   Blood: x / Protein: x / Nitrite: x   Leuk Esterase: x / RBC: x / WBC x   Sq Epi: x / Non Sq Epi: x / Bacteria: x        < from: US Duplex Venous Lower Ext Complete, Bilateral (05.21.25 @ 19:48) >  PROCEDURE DATE:  05/21/2025    INTERPRETATION:  CLINICAL INFORMATION: Calf tenderness  COMPARISON: 4/29/2025.    IMPRESSION:  Technically difficult study. No evidence of DVT bilateral lower   extremities as visualized.    MEDICATIONS  (STANDING):  ammonium lactate 12% Lotion 1 Application(s) Topical two times a day  apixaban 5 milliGRAM(s) Oral two times a day  ascorbic acid 500 milliGRAM(s) Oral daily  bisacodyl 10 milliGRAM(s) Oral at bedtime  capsaicin 0.025% Cream 1 Application(s) Topical four times a day  clotrimazole 1% Cream 1 Application(s) Topical <User Schedule>  collagenase Ointment 1 Application(s) Topical two times a day  Dakins Solution - 1/4 Strength 1 Application(s) Topical two times a day  dextrose 5%. 1000 milliLiter(s) (100 mL/Hr) IV Continuous <Continuous>  dextrose 5%. 1000 milliLiter(s) (50 mL/Hr) IV Continuous <Continuous>  dextrose 50% Injectable 25 Gram(s) IV Push once  dextrose 50% Injectable 12.5 Gram(s) IV Push once  dextrose 50% Injectable 25 Gram(s) IV Push once  DULoxetine 60 milliGRAM(s) Oral daily  gabapentin 1200 milliGRAM(s) Oral every 8 hours  glucagon  Injectable 1 milliGRAM(s) IntraMuscular once  influenza   Vaccine 0.5 milliLiter(s) IntraMuscular once  lidocaine   4% Patch 1 Patch Transdermal daily  lidocaine   4% Patch 1 Patch Transdermal daily  magnesium oxide 400 milliGRAM(s) Oral three times a day with meals  melatonin 9 milliGRAM(s) Oral at bedtime  metoprolol tartrate 50 milliGRAM(s) Oral two times a day  multivitamin 1 Tablet(s) Oral daily  mupirocin 2% Ointment 1 Application(s) Topical two times a day  naloxegol 25 milliGRAM(s) Oral daily  pantoprazole    Tablet 40 milliGRAM(s) Oral before breakfast  petrolatum white Ointment 1 Application(s) Topical every 8 hours  polyethylene glycol 3350 17 Gram(s) Oral two times a day  povidone iodine 10% Solution 1 Application(s) Topical daily  pregabalin 25 milliGRAM(s) Oral two times a day  QUEtiapine 25 milliGRAM(s) Oral at bedtime  sevelamer carbonate 800 milliGRAM(s) Oral three times a day with meals  silver nitrate Applicator 6 Application(s) Topical once  tamsulosin 0.8 milliGRAM(s) Oral at bedtime  zinc sulfate 220 milliGRAM(s) Oral daily    MEDICATIONS  (PRN):  albuterol/ipratropium for Nebulization 3 milliLiter(s) Nebulizer every 6 hours PRN Shortness of Breath and/or Wheezing  dextrose Oral Gel 15 Gram(s) Oral once PRN Blood Glucose LESS THAN 70 milliGRAM(s)/deciliter  oxyCODONE    IR 20 milliGRAM(s) Oral every 6 hours PRN Severe Pain (7 - 10)  oxyCODONE    IR 15 milliGRAM(s) Oral two times a day PRN Moderate Pain (4 - 6)  oxyCODONE    IR 10 milliGRAM(s) Oral two times a day PRN Mild Pain (1 - 3)

## 2025-05-27 NOTE — PROGRESS NOTE ADULT - SUBJECTIVE AND OBJECTIVE BOX
Patient is a 39y old  Male who presents with a chief complaint of Debility secondary to acute hypoxic respiratory failure (26 May 2025 14:03)      Patient seen and examined at bedside. NAD. endorses back pain from position in bed. denies headache, fever, chills, cp, sob, n/v, abd pain.     ALLERGIES:  No Known Allergies    MEDICATIONS  (STANDING):  ammonium lactate 12% Lotion 1 Application(s) Topical two times a day  apixaban 5 milliGRAM(s) Oral two times a day  ascorbic acid 500 milliGRAM(s) Oral daily  bisacodyl 10 milliGRAM(s) Oral at bedtime  capsaicin 0.025% Cream 1 Application(s) Topical four times a day  clotrimazole 1% Cream 1 Application(s) Topical <User Schedule>  collagenase Ointment 1 Application(s) Topical two times a day  Dakins Solution - 1/4 Strength 1 Application(s) Topical two times a day  dextrose 5%. 1000 milliLiter(s) (100 mL/Hr) IV Continuous <Continuous>  dextrose 5%. 1000 milliLiter(s) (50 mL/Hr) IV Continuous <Continuous>  dextrose 50% Injectable 25 Gram(s) IV Push once  dextrose 50% Injectable 12.5 Gram(s) IV Push once  dextrose 50% Injectable 25 Gram(s) IV Push once  DULoxetine 60 milliGRAM(s) Oral daily  gabapentin 1200 milliGRAM(s) Oral every 8 hours  glucagon  Injectable 1 milliGRAM(s) IntraMuscular once  influenza   Vaccine 0.5 milliLiter(s) IntraMuscular once  lidocaine   4% Patch 1 Patch Transdermal daily  lidocaine   4% Patch 1 Patch Transdermal daily  magnesium oxide 400 milliGRAM(s) Oral three times a day with meals  melatonin 9 milliGRAM(s) Oral at bedtime  metoprolol tartrate 50 milliGRAM(s) Oral two times a day  multivitamin 1 Tablet(s) Oral daily  mupirocin 2% Ointment 1 Application(s) Topical two times a day  naloxegol 25 milliGRAM(s) Oral daily  pantoprazole    Tablet 40 milliGRAM(s) Oral before breakfast  petrolatum white Ointment 1 Application(s) Topical every 8 hours  polyethylene glycol 3350 17 Gram(s) Oral two times a day  povidone iodine 10% Solution 1 Application(s) Topical daily  pregabalin 25 milliGRAM(s) Oral two times a day  QUEtiapine 25 milliGRAM(s) Oral at bedtime  sevelamer carbonate 800 milliGRAM(s) Oral three times a day with meals  silver nitrate Applicator 6 Application(s) Topical once  tamsulosin 0.8 milliGRAM(s) Oral at bedtime  zinc sulfate 220 milliGRAM(s) Oral daily    MEDICATIONS  (PRN):  albuterol/ipratropium for Nebulization 3 milliLiter(s) Nebulizer every 6 hours PRN Shortness of Breath and/or Wheezing  dextrose Oral Gel 15 Gram(s) Oral once PRN Blood Glucose LESS THAN 70 milliGRAM(s)/deciliter  oxyCODONE    IR 20 milliGRAM(s) Oral every 6 hours PRN Severe Pain (7 - 10)  oxyCODONE    IR 15 milliGRAM(s) Oral two times a day PRN Moderate Pain (4 - 6)  oxyCODONE    IR 10 milliGRAM(s) Oral two times a day PRN Mild Pain (1 - 3)    Vital Signs Last 24 Hrs  T(F): 98.9 (26 May 2025 20:39), Max: 98.9 (26 May 2025 20:39)  HR: 93 (27 May 2025 06:55) (93 - 99)  BP: 132/72 (27 May 2025 06:55) (104/64 - 132/72)  RR: 16 (27 May 2025 06:55) (16 - 16)  SpO2: 95% (27 May 2025 06:55) (95% - 98%)  I&O's Summary    26 May 2025 07:01  -  27 May 2025 07:00  --------------------------------------------------------  IN: 0 mL / OUT: 800 mL / NET: -800 mL          PHYSICAL EXAM:  General: NAD, A/O x 3, morbidly obese  ENT: MMM, no scleral icterus  Neck: Supple, No JVD  Lungs: Clear to auscultation bilaterally, no wheezes, rales, rhonchi  Cardio: RRR, S1/S2  Abdomen: Soft, Nontender, Nondistended; Bowel sounds present  Extremities: No calf tenderness, No pitting edema    LABS:                        8.5    7.71  )-----------( 417      ( 27 May 2025 06:30 )             30.1       05-27    142  |  101  |  9   ----------------------------<  77  3.5   |  30  |  1.01    Ca    9.3      27 May 2025 06:30    TPro  7.0  /  Alb  2.0  /  TBili  0.4  /  DBili  x   /  AST  17  /  ALT  8   /  AlkPhos  46  05-27                03-08 Chol -- LDL -- HDL -- Trig 169 mg/dL                  Urinalysis Basic - ( 27 May 2025 06:30 )    Color: x / Appearance: x / SG: x / pH: x  Gluc: 77 mg/dL / Ketone: x  / Bili: x / Urobili: x   Blood: x / Protein: x / Nitrite: x   Leuk Esterase: x / RBC: x / WBC x   Sq Epi: x / Non Sq Epi: x / Bacteria: x            RADIOLOGY & ADDITIONAL TESTS:    Care Discussed with Consultants/Other Providers: yes, rehab

## 2025-05-28 PROCEDURE — 99233 SBSQ HOSP IP/OBS HIGH 50: CPT

## 2025-05-28 RX ORDER — CELECOXIB 50 MG/1
200 CAPSULE ORAL
Refills: 0 | Status: DISCONTINUED | OUTPATIENT
Start: 2025-05-28 | End: 2025-05-30

## 2025-05-28 RX ADMIN — CLOTRIMAZOLE 1 APPLICATION(S): 1 CREAM TOPICAL at 13:58

## 2025-05-28 RX ADMIN — Medication 1 APPLICATION(S): at 13:59

## 2025-05-28 RX ADMIN — APIXABAN 5 MILLIGRAM(S): 2.5 TABLET, FILM COATED ORAL at 05:17

## 2025-05-28 RX ADMIN — METOPROLOL SUCCINATE 50 MILLIGRAM(S): 50 TABLET, EXTENDED RELEASE ORAL at 05:17

## 2025-05-28 RX ADMIN — OXYCODONE HYDROCHLORIDE 20 MILLIGRAM(S): 30 TABLET ORAL at 08:41

## 2025-05-28 RX ADMIN — Medication 220 MILLIGRAM(S): at 14:02

## 2025-05-28 RX ADMIN — OXYCODONE HYDROCHLORIDE 10 MILLIGRAM(S): 30 TABLET ORAL at 20:38

## 2025-05-28 RX ADMIN — TAMSULOSIN HYDROCHLORIDE 0.8 MILLIGRAM(S): 0.4 CAPSULE ORAL at 22:32

## 2025-05-28 RX ADMIN — SEVELAMER HYDROCHLORIDE 800 MILLIGRAM(S): 800 TABLET ORAL at 14:02

## 2025-05-28 RX ADMIN — Medication 1 APPLICATION(S): at 19:42

## 2025-05-28 RX ADMIN — Medication 400 MILLIGRAM(S): at 14:02

## 2025-05-28 RX ADMIN — OXYCODONE HYDROCHLORIDE 10 MILLIGRAM(S): 30 TABLET ORAL at 21:38

## 2025-05-28 RX ADMIN — MUPIROCIN CALCIUM 1 APPLICATION(S): 20 CREAM TOPICAL at 05:36

## 2025-05-28 RX ADMIN — NALOXEGOL OXALATE 25 MILLIGRAM(S): 12.5 TABLET, FILM COATED ORAL at 14:02

## 2025-05-28 RX ADMIN — QUETIAPINE FUMARATE 25 MILLIGRAM(S): 25 TABLET ORAL at 22:32

## 2025-05-28 RX ADMIN — SODIUM HYPOCHLORITE 1 APPLICATION(S): 0.12 SOLUTION TOPICAL at 19:42

## 2025-05-28 RX ADMIN — GABAPENTIN 1200 MILLIGRAM(S): 400 CAPSULE ORAL at 05:17

## 2025-05-28 RX ADMIN — CELECOXIB 200 MILLIGRAM(S): 50 CAPSULE ORAL at 09:16

## 2025-05-28 RX ADMIN — Medication 1 TABLET(S): at 14:02

## 2025-05-28 RX ADMIN — Medication 1 APPLICATION(S): at 05:35

## 2025-05-28 RX ADMIN — OXYCODONE HYDROCHLORIDE 15 MILLIGRAM(S): 30 TABLET ORAL at 19:00

## 2025-05-28 RX ADMIN — Medication 1 APPLICATION(S): at 19:41

## 2025-05-28 RX ADMIN — APIXABAN 5 MILLIGRAM(S): 2.5 TABLET, FILM COATED ORAL at 18:28

## 2025-05-28 RX ADMIN — CELECOXIB 200 MILLIGRAM(S): 50 CAPSULE ORAL at 18:28

## 2025-05-28 RX ADMIN — Medication 400 MILLIGRAM(S): at 08:41

## 2025-05-28 RX ADMIN — OXYCODONE HYDROCHLORIDE 15 MILLIGRAM(S): 30 TABLET ORAL at 18:29

## 2025-05-28 RX ADMIN — OXYCODONE HYDROCHLORIDE 20 MILLIGRAM(S): 30 TABLET ORAL at 23:32

## 2025-05-28 RX ADMIN — OXYCODONE HYDROCHLORIDE 20 MILLIGRAM(S): 30 TABLET ORAL at 22:32

## 2025-05-28 RX ADMIN — PREGABALIN 25 MILLIGRAM(S): 50 CAPSULE ORAL at 05:17

## 2025-05-28 RX ADMIN — Medication 40 MILLIGRAM(S): at 05:17

## 2025-05-28 RX ADMIN — SEVELAMER HYDROCHLORIDE 800 MILLIGRAM(S): 800 TABLET ORAL at 18:28

## 2025-05-28 RX ADMIN — CELECOXIB 200 MILLIGRAM(S): 50 CAPSULE ORAL at 19:00

## 2025-05-28 RX ADMIN — Medication 500 MILLIGRAM(S): at 14:02

## 2025-05-28 RX ADMIN — Medication 9 MILLIGRAM(S): at 22:32

## 2025-05-28 RX ADMIN — Medication 400 MILLIGRAM(S): at 18:28

## 2025-05-28 RX ADMIN — GABAPENTIN 1200 MILLIGRAM(S): 400 CAPSULE ORAL at 22:33

## 2025-05-28 RX ADMIN — SEVELAMER HYDROCHLORIDE 800 MILLIGRAM(S): 800 TABLET ORAL at 08:41

## 2025-05-28 RX ADMIN — METOPROLOL SUCCINATE 50 MILLIGRAM(S): 50 TABLET, EXTENDED RELEASE ORAL at 18:28

## 2025-05-28 RX ADMIN — GABAPENTIN 1200 MILLIGRAM(S): 400 CAPSULE ORAL at 14:01

## 2025-05-28 NOTE — PROGRESS NOTE ADULT - SUBJECTIVE AND OBJECTIVE BOX
Subjective  Patient seen and examined this AM.  Seen and observed in therapy at bedside   Reports recurring left ant hip pain limiting engagement in therapy   Also has increasing pain and drainage from sacral ulcer on f/u with plastics team  No neuropathic symptoms    ROS--no acute pain, no fever or chest pain   B/l leg and sacral ulcers  LBM 5/27    Function 5/28  Recommence therapy today as tolerated  Observed during PT, tolerating same, ROM both ankles and legs    5/23 Ambulated 25', 15', 8' with bariatric RW needing MIN/MOD A x 2 and wheelchair  follow. Continues to use modified step to 3 point gait pattern (leading with  Right LE) and excessive offloading with UEs on device due to bilateral foot  pain.  -Distances limited by overall fatigue, UE fatigue, and worsening pain to feet  -Extended rest breaks provided to allow adequate recovery time between walking  trials.    Therapeutic Activity  -Sit to/from stand with bariatric RW needing MOD A x 2. Verbal cuing for proper  foot placement under wheelchair prior to standing attempt and anterior  rocking/weight shifting. Multiple sit to stands performed throughout gait  training.  -Patient returned to bed at end of session with use of overhead Nette system     Vital Signs Last 24 Hrs  T(C): 36.9 (28 May 2025 08:46), Max: 37.1 (27 May 2025 22:08)  T(F): 98.5 (28 May 2025 08:46), Max: 98.7 (27 May 2025 22:08)  HR: 92 (28 May 2025 08:46) (92 - 114)  BP: 117/74 (28 May 2025 08:46) (99/63 - 117/74)  RR: 16 (28 May 2025 08:46) (16 - 16)  SpO2: 94% (28 May 2025 08:46) (94% - 98%)  O2 Parameters below as of 28 May 2025 08:46  Patient On (Oxygen Delivery Method): room air        EXAM  Gen - mod discomfort with movement of left hip  HEENT - NAD  Neck - Supple, No limited ROM  Pulm - Clear  Cardiovascular - RRR, S1S2, No murmurs  Abdomen - Soft, non tender, +BS  Extremities - chronic skin changes with hyperpigmentation at shin, callus on feet      Neuro-  AAO X 3, Speech is normal     Motor - UE 5/5                    LEFT    LE - HF 3/5, KE 3/5 ankle 2/5                    RIGHT LE - HF 3/5, KE 3/5 ankle 2/5      Sensory - Decreased to light touch bilateral lower extremities      Coordination - impaired lower extremities   Psychiatric - Mood stable, Affect WNL    Skin:  R foot plantar aspect lateral/distal side 7cm x 5.5cm ischemic wound from pressors  RLE 4th digit 1x1.5cm ischemic induced wound from pressors  RLE 5th digit 3x2cm ischemic induced wound from pressors  L foot plantar aspect lateral/distal side 6x7cm ischemic induced wound from pressor usage  LLE 4th digit 2x1cm ischemic induced wound from pressors  LLE 5th digit 2.5x3cm ischemic induced wound from pressors   R groin skin tear 1x1.5cm  Unstageable pressure wound cocyx 7cmx4.5cm w/ 2.5cm depth  R buttock pressure induced wound semi contiguous with coccyx wound calling unstageable given prior debridement 8.5x5.5cm  L buttock 1.0x0.5cm stage 3 pressure injury  R buttock skin tear 3.5cm x 0.5cm and 1.5cmx0.5cm  Dry skin with callus on sole of foot     MMS--antigravity upper extremities, except left hip, limited by pain     LABS:                        8.5    7.71  )-----------( 417      ( 27 May 2025 06:30 )             30.1     05-27    142  |  101  |  9   ----------------------------<  77  3.5   |  30  |  1.01    Ca    9.3      27 May 2025 06:30    TPro  7.0  /  Alb  2.0[L]  /  TBili  0.4  /  DBili  x   /  AST  17  /  ALT  8[L]  /  AlkPhos  46  05-27      Urinalysis Basic - ( 27 May 2025 06:30 )    Color: x / Appearance: x / SG: x / pH: x  Gluc: 77 mg/dL / Ketone: x  / Bili: x / Urobili: x   Blood: x / Protein: x / Nitrite: x   Leuk Esterase: x / RBC: x / WBC x   Sq Epi: x / Non Sq Epi: x / Bacteria: x        < from: US Duplex Venous Lower Ext Complete, Bilateral (05.21.25 @ 19:48) >  PROCEDURE DATE:  05/21/2025    INTERPRETATION:  CLINICAL INFORMATION: Calf tenderness  COMPARISON: 4/29/2025.    IMPRESSION:  Technically difficult study. No evidence of DVT bilateral lower   extremities as visualized.    MEDICATIONS  (STANDING):  ammonium lactate 12% Lotion 1 Application(s) Topical two times a day  apixaban 5 milliGRAM(s) Oral two times a day  ascorbic acid 500 milliGRAM(s) Oral daily  bisacodyl 10 milliGRAM(s) Oral at bedtime  capsaicin 0.025% Cream 1 Application(s) Topical four times a day  celecoxib 200 milliGRAM(s) Oral two times a day  clotrimazole 1% Cream 1 Application(s) Topical <User Schedule>  collagenase Ointment 1 Application(s) Topical two times a day  Dakins Solution - 1/4 Strength 1 Application(s) Topical two times a day  dextrose 5%. 1000 milliLiter(s) (100 mL/Hr) IV Continuous <Continuous>  dextrose 5%. 1000 milliLiter(s) (50 mL/Hr) IV Continuous <Continuous>  dextrose 50% Injectable 25 Gram(s) IV Push once  dextrose 50% Injectable 12.5 Gram(s) IV Push once  dextrose 50% Injectable 25 Gram(s) IV Push once  gabapentin 1200 milliGRAM(s) Oral every 8 hours  glucagon  Injectable 1 milliGRAM(s) IntraMuscular once  influenza   Vaccine 0.5 milliLiter(s) IntraMuscular once  lidocaine   4% Patch 1 Patch Transdermal daily  lidocaine   4% Patch 1 Patch Transdermal daily  magnesium oxide 400 milliGRAM(s) Oral three times a day with meals  melatonin 9 milliGRAM(s) Oral at bedtime  metoprolol tartrate 50 milliGRAM(s) Oral two times a day  multivitamin 1 Tablet(s) Oral daily  mupirocin 2% Ointment 1 Application(s) Topical two times a day  naloxegol 25 milliGRAM(s) Oral daily  pantoprazole    Tablet 40 milliGRAM(s) Oral before breakfast  petrolatum white Ointment 1 Application(s) Topical every 8 hours  polyethylene glycol 3350 17 Gram(s) Oral two times a day  povidone iodine 10% Solution 1 Application(s) Topical daily  QUEtiapine 25 milliGRAM(s) Oral at bedtime  sevelamer carbonate 800 milliGRAM(s) Oral three times a day with meals  silver nitrate Applicator 6 Application(s) Topical once  tamsulosin 0.8 milliGRAM(s) Oral at bedtime  zinc sulfate 220 milliGRAM(s) Oral daily    MEDICATIONS  (PRN):  albuterol/ipratropium for Nebulization 3 milliLiter(s) Nebulizer every 6 hours PRN Shortness of Breath and/or Wheezing  dextrose Oral Gel 15 Gram(s) Oral once PRN Blood Glucose LESS THAN 70 milliGRAM(s)/deciliter  oxyCODONE    IR 20 milliGRAM(s) Oral every 6 hours PRN Severe Pain (7 - 10)  oxyCODONE    IR 15 milliGRAM(s) Oral two times a day PRN Moderate Pain (4 - 6)  oxyCODONE    IR 10 milliGRAM(s) Oral two times a day PRN Mild Pain (1 - 3)

## 2025-05-28 NOTE — PROGRESS NOTE ADULT - ASSESSMENT
39 year old male with PMH of morbid obesity, BEA on CPAP, pAFIB (not on AC prior to admission), HTN, and BL papilledema attributed to pseudotumor cerebri; who initially presented to  on 2/24 with SOB and BL LE edema. Found to have URI with progressive SOB and multifocal PNA on imaging. His hospital course was complicated by worsening respiratory distress and increased 02 demands secondary to secretions (requiring multiple intubation attempts) and eventual MICU admission. While in MICU, he 02 requirements continued despite optimal vent management. Concern noted for progressive ARDS, unable to prone given morbid obesity, and ultimately cannulated for vvECMO on 2/25 and transferred to [facility] for further management on 2/26.  His hospital course at [facility] was significant for the following: diuretic and ABX management, s/p trache and PEG (placed on 3/7- PEG since removed), RCU admission, high grade fevers (secondary to panniculitis), progressively worsening sacral ulcer (likely osteomyelitis- s/p surgical debridement), BL foot wound (secondary to pressor use), neuropathy (secondary to critical illness polyneuropathy), ELLA (secondary to vancomycin toxicity and overdiuresis- since DCd- with improvement). Patient now admitted for a multidisciplinary rehab program. 04-25-25 @ 15:19    * Recurring left hip pain, known OA from recent imaging---recommence celebrex, for few days  * Pain both feet- continue current analgesic regimen - await offloading shoes from orthotics   * Recommence therapy as tolerated  * Plastics f/u to monitor sacral wound with deep crater    # Critical illness myopathy due to respiratory failure and sepsis  (present on admission)   - Gait Instability, ADL impairments and Functional impairments: start Comprehensive Rehab Program of PT/OT  - 3 hours a day, 5 days a week   - PRN: Nebulizer QID     #BEA on BIPAP  --via nasal canula qhs    #Sacral Osteomyelitis  - Zosyn TID - completed on 5/12/25    #Paroxysmal AFIB  - Eliquis 5mg BID     #Calf Tenderness  - BL LE doppler negative 5/21     #L hip pain - resolved resolved   - no acute findings on XR or CT   - L hip MRI (5/7) no significant findings     #HTN  - Amlodipine 10mg dialy     #Sleep/Mood  - Melatonin 9mg at HS   - Quetiapine 25mg at HS     #Skin  Wound Recs per Plastic Surgery (5/16):  Sacral Pressure Injury: BID- Cleanse with NS, remove excess fluid, apply santyl ointment to sloughing tissues, pack wound with 1-inch dry plain packing, cover with 4 inch gauze and abd combine  - R buttock: BID- Cleanse with NS, overlay calcium alginate, ABD combine dressing  Additional wound care instructions:  -->Right anterior thigh to groin isolated wound: Clean wound and periwound skin with NS. Pat dry. Apply liquid barrier film to periwound skin, allow to dry. Cover with silicone foam with border. Change daily or PRN if soiled   --> Bilateral abdominal pannus, bilateral groin: Cleanse with luke-warm soap and water, dry well. Apply clotrimazole  --> Bilateral feet: Apply betadine  to bilateral foot wounds followed by 4x4 gauze, ABD pad, and matilde daily per podiatry.  --> Continue to offload pressure; bariatric low airloss support surface, turn and position per protocol with use of fluidized positioning devices, continue incontinence and moisture care per protocol and use of single breathable incontinence pads, continue to offload heels with fluidized positioning devices. Continue use of bariatric seat cushion, limit sit time- no more than 2 consecutive hours at any given time.  - Pressure injury/Skin: OOB to Chair, PT/OT    - Ammonium lactate to BL LEs     #Neuropathy  - Cymbalta 60mg daily   - Gabapentin 1200mg TID     #Pain Mgmt   - Capsaicin cream to BL feet QID - Avoid use on damaged, broken, or irritated skin. Avoid occlusive dressing or heat   - Lidocaine patch to BL thighs   - PRN:; Oxycodone and Tylenol 650mg QID     #Morbid obesity  - Most recent weight 394lbs (5/7)     #GI/Bowel Mgmt   - Pantoprazole  - Bisacodyl 10mg at HS   - Miralax   - Naloxegal 25mg daily     #/Bladder Mgmt   #ELLA  #Urinary Hesitancy  - USKB 5/20 negative   - Renvela 800mg TID  - Cr improving   - Bladder scan TID  - Flomax 0.8mg at HS     #FEN --Morbid obesity (BMI > 40).  - Diet - Regular + Thins  [[facility]HO]    - MVI     # Health Management:   Environmental challenge affecting dc plan--narrow door and need for ramp to house entrance  However, patient making goal directed effort towards therapy goals     #Precautions / PROPHYLAXIS:   - Falls  - ortho: Weight bearing status: WBAT in surgical shoe   - Lungs: Aspiration, Incentive Spirometer   - DVT: Lovenox  ----------------------------------------------  IDT 5/27 5/27--Therapy held today due to pain over sacral wound    Function as at 5/23  Stand pivot T/F during shower max x 2 persons  Pain b/l feet over areas with skin break on dorsum of foot, limiting wt bearing  Most transfers still  Sit to stand mod assist x 1 T/f bed to  Bussy board mod assist x 1  Took 4 steps on parallel bars with mo assist x 2  Making progress towards therapy goals  Barrier--ulcers on feet, limiting wt bearing, urinary hesitancy, environmental barrier at home (stairs), severe obesity  LE weakness  Overall making functional progress, has supportive family,  hence decision to extend DC date and achieve same home dc   Ext dc 6/6  (barriers as noted, making functional progress)  will discuss Home vs [facility] placement considering multiple issues--environmental barrier at home, large body habitus, difficulty with transfers    ----------------------------------------------  Dr. Holder Liaison with Family/Providers:  5/22--Patient's partner at bedside, gets regular updates on patient's clinical and functional progress  5/2--D/w orthotist Mr Dawn, consult for pressure offloading shoes both legs   ----------------------------------------------  OUTPATIENT/FOLLOW UP:    Your Primary Care Provider,   Phone: (   )    -  Fax: (   )    -  Follow Up Time: 1 week    [facility] [place],   [address] [place] [address]  Phone: [phone]  Fax: (   )    -  Follow Up Time:    Dr. Waterhouse  Podiatry  [phone]  Within 1 week     Spent 52 mins, patient review, observation in therapy, pain mgt and care co ordination, liaison with other providers hospitalist, plastics     39 year old male with PMH of morbid obesity, BEA on CPAP, pAFIB (not on AC prior to admission), HTN, and BL papilledema attributed to pseudotumor cerebri; who initially presented to  on 2/24 with SOB and BL LE edema. Found to have URI with progressive SOB and multifocal PNA on imaging. His hospital course was complicated by worsening respiratory distress and increased 02 demands secondary to secretions (requiring multiple intubation attempts) and eventual MICU admission. While in MICU, he 02 requirements continued despite optimal vent management. Concern noted for progressive ARDS, unable to prone given morbid obesity, and ultimately cannulated for vvECMO on 2/25 and transferred to [facility] for further management on 2/26.  His hospital course at [facility] was significant for the following: diuretic and ABX management, s/p trache and PEG (placed on 3/7- PEG since removed), RCU admission, high grade fevers (secondary to panniculitis), progressively worsening sacral ulcer (likely osteomyelitis- s/p surgical debridement), BL foot wound (secondary to pressor use), neuropathy (secondary to critical illness polyneuropathy), ELLA (secondary to vancomycin toxicity and overdiuresis- since DCd- with improvement). Patient now admitted for a multidisciplinary rehab program. 04-25-25 @ 15:19    * Recurring left hip pain, known OA from recent imaging---recommence celebrex, for few days  * Analgesic regimen revised --d/c duloxetine and lyrica, will gradually start tapering gabapentin  * Pain both feet- continue current analgesic regimen - await offloading shoes from orthotics   * Recommence therapy as tolerated  * Plastics f/u to monitor sacral wound with deep crater    # Critical illness myopathy due to respiratory failure and sepsis  (present on admission)   - Gait Instability, ADL impairments and Functional impairments: start Comprehensive Rehab Program of PT/OT  - 3 hours a day, 5 days a week   - PRN: Nebulizer QID     #BEA on BIPAP  --via nasal canula qhs    #Sacral Osteomyelitis  - Zosyn TID - completed on 5/12/25    #Paroxysmal AFIB  - Eliquis 5mg BID     #Calf Tenderness  - BL LE doppler negative 5/21     #L hip pain - resolved resolved   - no acute findings on XR or CT   - L hip MRI (5/7) no significant findings     #HTN  - Amlodipine 10mg dialy     #Sleep/Mood  - Melatonin 9mg at HS   - Quetiapine 25mg at HS     #Skin  Wound Recs per Plastic Surgery (5/16):  Sacral Pressure Injury: BID- Cleanse with NS, remove excess fluid, apply santyl ointment to sloughing tissues, pack wound with 1-inch dry plain packing, cover with 4 inch gauze and abd combine  - R buttock: BID- Cleanse with NS, overlay calcium alginate, ABD combine dressing  Additional wound care instructions:  -->Right anterior thigh to groin isolated wound: Clean wound and periwound skin with NS. Pat dry. Apply liquid barrier film to periwound skin, allow to dry. Cover with silicone foam with border. Change daily or PRN if soiled   --> Bilateral abdominal pannus, bilateral groin: Cleanse with luke-warm soap and water, dry well. Apply clotrimazole  --> Bilateral feet: Apply betadine  to bilateral foot wounds followed by 4x4 gauze, ABD pad, and matilde daily per podiatry.  --> Continue to offload pressure; bariatric low airloss support surface, turn and position per protocol with use of fluidized positioning devices, continue incontinence and moisture care per protocol and use of single breathable incontinence pads, continue to offload heels with fluidized positioning devices. Continue use of bariatric seat cushion, limit sit time- no more than 2 consecutive hours at any given time.  - Pressure injury/Skin: OOB to Chair, PT/OT    - Ammonium lactate to BL LEs     #Neuropathy  - Cymbalta 60mg daily --d/melly 5/28  - Gabapentin 1200mg TID     #Pain Mgmt   - Capsaicin cream to BL feet QID - Avoid use on damaged, broken, or irritated skin. Avoid occlusive dressing or heat   - Lidocaine patch to BL thighs   - PRN:; Oxycodone and Tylenol 650mg QID   --* Analgesic regimen revised --d/c duloxetine and lyrica, will gradually start tapering gabapentin    #Morbid obesity  - Most recent weight 394lbs (5/7)     #GI/Bowel Mgmt   - Pantoprazole  - Bisacodyl 10mg at HS   - Miralax   - Naloxegal 25mg daily     #/Bladder Mgmt   #ELLA  - USKB 5/20 negative   - Renvela 800mg TID    #FEN --Morbid obesity (BMI > 40).  - [facility] - Regular + [facility]ins  [facility]    - MVI     # Health Management:   Environmental challenge affecting dc plan--narrow door and need for ramp to house entrance  However, patient making goal directed effort towards therapy goals     #Precautions / PROPHYLAXIS:   - Falls  - ortho: Weight bearing status: WBAT in surgical shoe   - Lungs: Aspiration, Incentive Spirometer   - DVT: Lovenox  ----------------------------------------------  IDT 5/27 5/27--Therapy held today due to pain over sacral wound    Function as at 5/23  Stand pivot T/F during shower max x 2 persons  Pain b/l feet over areas with skin break on dorsum of foot, limiting wt bearing  Most transfers still  Sit to stand mod assist x 1 T/f bed to  Bussy board mod assist x 1  Took 4 steps on parallel bars with mo assist x 2  Making progress towards therapy goals  Barrier--ulcers on feet, limiting wt bearing, urinary hesitancy, environmental barrier at home (stairs), severe obesity  LE weakness  Overall making functional progress, has supportive family,  hence decision to extend DC date and achieve same home dc   Ext dc 6/6  (barriers as noted, making functional progress)  will discuss Home vs [facility] placement considering multiple issues--environmental barrier at home, large body habitus, difficulty with transfers    ----------------------------------------------  Dr. CANTU's Liaison with Family/Providers:  5/22--Patient's partner at bedside, gets regular updates on patient's clinical and functional progress  5/2--D/w orthotist Mr Dawn, consult for pressure offloading shoes both legs   ----------------------------------------------  OUTPATIENT/FOLLOW UP:    Your Primary Care Provider,   Phone: (   )    -  Fax: (   )    -  Follow Up Time: 1 week    [facility] [place],   [address] [place] [address]  Phone: [phone]  Fax: (   )    -  Follow Up Time:    Dr. Waterhouse  Podiatry  [phone]  Within 1 week     Spent 52 mins, patient review, observation in therapy, pain mgt and care co ordination, liaison with other providers hospitalist, plastics, discussed update with father

## 2025-05-29 LAB
ALBUMIN SERPL ELPH-MCNC: 1.9 G/DL — LOW (ref 3.3–5)
ALP SERPL-CCNC: 46 U/L — SIGNIFICANT CHANGE UP (ref 40–120)
ALT FLD-CCNC: 9 U/L — LOW (ref 10–45)
ANION GAP SERPL CALC-SCNC: 7 MMOL/L — SIGNIFICANT CHANGE UP (ref 5–17)
AST SERPL-CCNC: 16 U/L — SIGNIFICANT CHANGE UP (ref 10–40)
BASOPHILS # BLD AUTO: 0.03 K/UL — SIGNIFICANT CHANGE UP (ref 0–0.2)
BASOPHILS NFR BLD AUTO: 0.4 % — SIGNIFICANT CHANGE UP (ref 0–2)
BILIRUB SERPL-MCNC: 0.4 MG/DL — SIGNIFICANT CHANGE UP (ref 0.2–1.2)
BUN SERPL-MCNC: 8 MG/DL — SIGNIFICANT CHANGE UP (ref 7–23)
CALCIUM SERPL-MCNC: 9.3 MG/DL — SIGNIFICANT CHANGE UP (ref 8.4–10.5)
CHLORIDE SERPL-SCNC: 104 MMOL/L — SIGNIFICANT CHANGE UP (ref 96–108)
CO2 SERPL-SCNC: 32 MMOL/L — HIGH (ref 22–31)
CREAT SERPL-MCNC: 0.85 MG/DL — SIGNIFICANT CHANGE UP (ref 0.5–1.3)
EGFR: 115 ML/MIN/1.73M2 — SIGNIFICANT CHANGE UP
EGFR: 115 ML/MIN/1.73M2 — SIGNIFICANT CHANGE UP
EOSINOPHIL # BLD AUTO: 0.26 K/UL — SIGNIFICANT CHANGE UP (ref 0–0.5)
EOSINOPHIL NFR BLD AUTO: 3.5 % — SIGNIFICANT CHANGE UP (ref 0–6)
GLUCOSE SERPL-MCNC: 90 MG/DL — SIGNIFICANT CHANGE UP (ref 70–99)
HCT VFR BLD CALC: 31 % — LOW (ref 39–50)
HGB BLD-MCNC: 9.1 G/DL — LOW (ref 13–17)
IMM GRANULOCYTES NFR BLD AUTO: 0.4 % — SIGNIFICANT CHANGE UP (ref 0–0.9)
LYMPHOCYTES # BLD AUTO: 1.97 K/UL — SIGNIFICANT CHANGE UP (ref 1–3.3)
LYMPHOCYTES # BLD AUTO: 26.2 % — SIGNIFICANT CHANGE UP (ref 13–44)
MCHC RBC-ENTMCNC: 24.7 PG — LOW (ref 27–34)
MCHC RBC-ENTMCNC: 29.4 G/DL — LOW (ref 32–36)
MCV RBC AUTO: 84 FL — SIGNIFICANT CHANGE UP (ref 80–100)
MONOCYTES # BLD AUTO: 1.01 K/UL — HIGH (ref 0–0.9)
MONOCYTES NFR BLD AUTO: 13.4 % — SIGNIFICANT CHANGE UP (ref 2–14)
NEUTROPHILS # BLD AUTO: 4.23 K/UL — SIGNIFICANT CHANGE UP (ref 1.8–7.4)
NEUTROPHILS NFR BLD AUTO: 56.1 % — SIGNIFICANT CHANGE UP (ref 43–77)
NRBC BLD AUTO-RTO: 0 /100 WBCS — SIGNIFICANT CHANGE UP (ref 0–0)
PLATELET # BLD AUTO: 495 K/UL — HIGH (ref 150–400)
POTASSIUM SERPL-MCNC: 3.5 MMOL/L — SIGNIFICANT CHANGE UP (ref 3.5–5.3)
POTASSIUM SERPL-SCNC: 3.5 MMOL/L — SIGNIFICANT CHANGE UP (ref 3.5–5.3)
PROT SERPL-MCNC: 7 G/DL — SIGNIFICANT CHANGE UP (ref 6–8.3)
RBC # BLD: 3.69 M/UL — LOW (ref 4.2–5.8)
RBC # FLD: 17.9 % — HIGH (ref 10.3–14.5)
SODIUM SERPL-SCNC: 143 MMOL/L — SIGNIFICANT CHANGE UP (ref 135–145)
WBC # BLD: 7.53 K/UL — SIGNIFICANT CHANGE UP (ref 3.8–10.5)
WBC # FLD AUTO: 7.53 K/UL — SIGNIFICANT CHANGE UP (ref 3.8–10.5)

## 2025-05-29 PROCEDURE — 99233 SBSQ HOSP IP/OBS HIGH 50: CPT

## 2025-05-29 PROCEDURE — 99232 SBSQ HOSP IP/OBS MODERATE 35: CPT

## 2025-05-29 RX ADMIN — APIXABAN 5 MILLIGRAM(S): 2.5 TABLET, FILM COATED ORAL at 17:56

## 2025-05-29 RX ADMIN — SODIUM HYPOCHLORITE 1 APPLICATION(S): 0.12 SOLUTION TOPICAL at 17:58

## 2025-05-29 RX ADMIN — Medication 400 MILLIGRAM(S): at 17:56

## 2025-05-29 RX ADMIN — Medication 220 MILLIGRAM(S): at 12:30

## 2025-05-29 RX ADMIN — MUPIROCIN CALCIUM 1 APPLICATION(S): 20 CREAM TOPICAL at 05:16

## 2025-05-29 RX ADMIN — APIXABAN 5 MILLIGRAM(S): 2.5 TABLET, FILM COATED ORAL at 05:14

## 2025-05-29 RX ADMIN — Medication 1 APPLICATION(S): at 12:31

## 2025-05-29 RX ADMIN — WHITE PETROLATUM 1 APPLICATION(S): 1 OINTMENT TOPICAL at 21:09

## 2025-05-29 RX ADMIN — OXYCODONE HYDROCHLORIDE 20 MILLIGRAM(S): 30 TABLET ORAL at 09:12

## 2025-05-29 RX ADMIN — NALOXEGOL OXALATE 25 MILLIGRAM(S): 12.5 TABLET, FILM COATED ORAL at 12:31

## 2025-05-29 RX ADMIN — SEVELAMER HYDROCHLORIDE 800 MILLIGRAM(S): 800 TABLET ORAL at 17:56

## 2025-05-29 RX ADMIN — OXYCODONE HYDROCHLORIDE 15 MILLIGRAM(S): 30 TABLET ORAL at 22:13

## 2025-05-29 RX ADMIN — Medication 1 APPLICATION(S): at 05:15

## 2025-05-29 RX ADMIN — CELECOXIB 200 MILLIGRAM(S): 50 CAPSULE ORAL at 18:00

## 2025-05-29 RX ADMIN — Medication 9 MILLIGRAM(S): at 21:08

## 2025-05-29 RX ADMIN — GABAPENTIN 1200 MILLIGRAM(S): 400 CAPSULE ORAL at 05:14

## 2025-05-29 RX ADMIN — CELECOXIB 200 MILLIGRAM(S): 50 CAPSULE ORAL at 17:56

## 2025-05-29 RX ADMIN — SEVELAMER HYDROCHLORIDE 800 MILLIGRAM(S): 800 TABLET ORAL at 12:31

## 2025-05-29 RX ADMIN — OXYCODONE HYDROCHLORIDE 15 MILLIGRAM(S): 30 TABLET ORAL at 14:15

## 2025-05-29 RX ADMIN — GABAPENTIN 1200 MILLIGRAM(S): 400 CAPSULE ORAL at 14:15

## 2025-05-29 RX ADMIN — Medication 1 TABLET(S): at 12:31

## 2025-05-29 RX ADMIN — Medication 500 MILLIGRAM(S): at 12:31

## 2025-05-29 RX ADMIN — QUETIAPINE FUMARATE 25 MILLIGRAM(S): 25 TABLET ORAL at 21:08

## 2025-05-29 RX ADMIN — OXYCODONE HYDROCHLORIDE 15 MILLIGRAM(S): 30 TABLET ORAL at 15:15

## 2025-05-29 RX ADMIN — CELECOXIB 200 MILLIGRAM(S): 50 CAPSULE ORAL at 05:14

## 2025-05-29 RX ADMIN — Medication 1 APPLICATION(S): at 18:00

## 2025-05-29 RX ADMIN — Medication 1 APPLICATION(S): at 05:17

## 2025-05-29 RX ADMIN — METOPROLOL SUCCINATE 50 MILLIGRAM(S): 50 TABLET, EXTENDED RELEASE ORAL at 05:15

## 2025-05-29 RX ADMIN — Medication 40 MILLIGRAM(S): at 05:14

## 2025-05-29 RX ADMIN — MUPIROCIN CALCIUM 1 APPLICATION(S): 20 CREAM TOPICAL at 17:57

## 2025-05-29 RX ADMIN — GABAPENTIN 1200 MILLIGRAM(S): 400 CAPSULE ORAL at 21:07

## 2025-05-29 RX ADMIN — METOPROLOL SUCCINATE 50 MILLIGRAM(S): 50 TABLET, EXTENDED RELEASE ORAL at 17:56

## 2025-05-29 RX ADMIN — Medication 10 MILLIGRAM(S): at 21:10

## 2025-05-29 RX ADMIN — Medication 1 APPLICATION(S): at 21:10

## 2025-05-29 RX ADMIN — Medication 400 MILLIGRAM(S): at 09:12

## 2025-05-29 RX ADMIN — SEVELAMER HYDROCHLORIDE 800 MILLIGRAM(S): 800 TABLET ORAL at 09:11

## 2025-05-29 RX ADMIN — Medication 1 APPLICATION(S): at 05:16

## 2025-05-29 RX ADMIN — OXYCODONE HYDROCHLORIDE 15 MILLIGRAM(S): 30 TABLET ORAL at 21:13

## 2025-05-29 RX ADMIN — SODIUM HYPOCHLORITE 1 APPLICATION(S): 0.12 SOLUTION TOPICAL at 05:31

## 2025-05-29 RX ADMIN — OXYCODONE HYDROCHLORIDE 20 MILLIGRAM(S): 30 TABLET ORAL at 10:12

## 2025-05-29 RX ADMIN — Medication 400 MILLIGRAM(S): at 12:30

## 2025-05-29 RX ADMIN — Medication 1 APPLICATION(S): at 17:57

## 2025-05-29 RX ADMIN — TAMSULOSIN HYDROCHLORIDE 0.8 MILLIGRAM(S): 0.4 CAPSULE ORAL at 21:09

## 2025-05-29 NOTE — PROGRESS NOTE ADULT - ASSESSMENT
39 year old male with PMH of morbid obesity, BEA on CPAP, pAFIB (not on AC prior to admission), HTN, and BL papilledema attributed to pseudotumor cerebri; who initially presented to  on 2/24 with SOB and BL LE edema. Found to have URI with progressive SOB and multifocal PNA on imaging. His hospital course was complicated by worsening respiratory distress and increased 02 demands secondary to secretions (requiring multiple intubation attempts) and eventual MICU admission. While in MICU, he 02 requirements continued despite optimal vent management. Concern noted for progressive ARDS, unable to prone given morbid obesity, and ultimately cannulated for vvECMO on 2/25 and transferred to [facility] for further management on 2/26.  His hospital course at [facility] was significant for the following: diuretic and ABX management, s/p trache and PEG (placed on 3/7- PEG since removed), RCU admission, high grade fevers (secondary to panniculitis), progressively worsening sacral ulcer (likely osteomyelitis- s/p surgical debridement), BL foot wound (secondary to pressor use), neuropathy (secondary to critical illness polyneuropathy), ELLA (secondary to vancomycin toxicity and overdiuresis- since DCd- with improvement). Patient now admitted for a multidisciplinary rehab program. 04-25-25 @ 15:19    * Recurring left hip pain, known OA from recent imaging---recommence celebrex, for few days  * Pain both feet- continue current analgesic regimen - await offloading shoes from orthotics   * Plastic surgery recs outpatient follow up for sacral wound management     # Critical illness myopathy due to respiratory failure and sepsis  (present on admission)   - Gait Instability, ADL impairments and Functional impairments: start Comprehensive Rehab Program of PT/OT  - 3 hours a day, 5 days a week   - PRN: Nebulizer QID     #BEA on BIPAP  --via nasal canula qhs    #Sacral Osteomyelitis  - Zosyn TID - completed on 5/12/25    #Paroxysmal AFIB  - Eliquis 5mg BID     #Calf Tenderness  - BL LE doppler negative 5/21     #L hip pain - resolved resolved   - no acute findings on XR or CT   - L hip MRI (5/7) no significant findings     #HTN  - Amlodipine 10mg dialy     #Sleep/Mood  - Melatonin 9mg at HS   - Quetiapine 25mg at HS     #Skin  Wound Recs per Plastic Surgery (5/16):  Sacral Pressure Injury: BID- Cleanse with NS, remove excess fluid, apply santyl ointment to sloughing tissues, pack wound with 1-inch dry plain packing, cover with 4 inch gauze and abd combine  - R buttock: BID- Cleanse with NS, overlay calcium alginate, ABD combine dressing  Additional wound care instructions:  -->Right anterior thigh to groin isolated wound: Clean wound and periwound skin with NS. Pat dry. Apply liquid barrier film to periwound skin, allow to dry. Cover with silicone foam with border. Change daily or PRN if soiled   --> Bilateral abdominal pannus, bilateral groin: Cleanse with luke-warm soap and water, dry well. Apply clotrimazole  --> Bilateral feet: Apply betadine  to bilateral foot wounds followed by 4x4 gauze, ABD pad, and matilde daily per podiatry.  --> Continue to offload pressure; bariatric low airloss support surface, turn and position per protocol with use of fluidized positioning devices, continue incontinence and moisture care per protocol and use of single breathable incontinence pads, continue to offload heels with fluidized positioning devices. Continue use of bariatric seat cushion, limit sit time- no more than 2 consecutive hours at any given time.  - Pressure injury/Skin: OOB to Chair, PT/OT    - Ammonium lactate to BL LEs     #Neuropathy  - Cymbalta 60mg daily --d/emlly 5/28  - Gabapentin 1200mg TID     #Pain Mgmt   - Capsaicin cream to BL feet QID - Avoid use on damaged, broken, or irritated skin. Avoid occlusive dressing or heat   - Lidocaine patch to BL thighs   - PRN:; Oxycodone and Tylenol 650mg QID   - will gradually start tapering gabapentin    #Morbid obesity  - Most recent weight 394lbs (5/7)     #GI/Bowel Mgmt   - Pantoprazole  - Bisacodyl 10mg at HS   - Miralax   - Naloxegal 25mg daily     #/Bladder Mgmt   #ELLA  - USKB 5/20 negative   - Renvela 800mg TID    #FEN --Morbid obesity (BMI > 40).  - Diet - Regular + Thins  [CCHO]    - MVI     # Health Management:   Environmental challenge affecting dc plan--narrow door and need for ramp to house entrance  However, patient making goal directed effort towards therapy goals     #Precautions / PROPHYLAXIS:   - Falls  - ortho: Weight bearing status: WBAT in surgical shoe   - Lungs: Aspiration, Incentive Spirometer   - DVT: Lovenox  ----------------------------------------------  IDT 5/27 5/27--Therapy held today due to pain over sacral wound    Function as at 5/23  Stand pivot T/F during shower max x 2 persons  Pain b/l feet over areas with skin break on dorsum of foot, limiting wt bearing  Most transfers still  Sit to stand mod assist x 1 T/f bed to  Bussy board mod assist x 1  Took 4 steps on parallel bars with mo assist x 2  Making progress towards therapy goals  Barrier--ulcers on feet, limiting wt bearing, urinary hesitancy, environmental barrier at home (stairs), severe obesity  LE weakness  Overall making functional progress, has supportive family,  hence decision to extend DC date and achieve same home dc   Ext dc 6/6  (barriers as noted, making functional progress)  will discuss Home vs [facility] placement considering multiple issues--environmental barrier at home, large body habitus, difficulty with transfers    ----------------------------------------------  Dr. CANTU's Liaison with Family/Providers:  5/22--Patient's partner at bedside, gets regular updates on patient's clinical and functional progress  5/2--D/w orthotist Mr Dawn, consult for pressure offloading shoes both legs   ----------------------------------------------  OUTPATIENT/FOLLOW UP:    Your Primary Care Provider,   Phone: (   )    -  Fax: (   )    -  Follow Up Time: 1 week    [facility],   [address] [place] [address]  Phone: [phone]  Fax: (   )    -  Follow Up Time:    Dr. Waterhouse  Podiatry  [phone]  Within 1 week     Spent 52 mins, patient review, observation in therapy, pain mgt and care co ordination, liaison with other providers hospitalist, plastics, discussed update with father     39 year old male with PMH of morbid obesity, BEA on CPAP, pAFIB (not on AC prior to admission), HTN, and BL papilledema attributed to pseudotumor cerebri; who initially presented to  on 2/24 with SOB and BL LE edema. Found to have URI with progressive SOB and multifocal PNA on imaging. His hospital course was complicated by worsening respiratory distress and increased 02 demands secondary to secretions (requiring multiple intubation attempts) and eventual MICU admission. While in MICU, he 02 requirements continued despite optimal vent management. Concern noted for progressive ARDS, unable to prone given morbid obesity, and ultimately cannulated for vvECMO on 2/25 and transferred to [facility] for further management on 2/26.  His hospital course at [facility] was significant for the following: diuretic and ABX management, s/p trache and PEG (placed on 3/7- PEG since removed), RCU admission, high grade fevers (secondary to panniculitis), progressively worsening sacral ulcer (likely osteomyelitis- s/p surgical debridement), BL foot wound (secondary to pressor use), neuropathy (secondary to critical illness polyneuropathy), ELLA (secondary to vancomycin toxicity and overdiuresis- since DCd- with improvement). Patient now admitted for a multidisciplinary rehab program. 04-25-25 @ 15:19    * Left hip pain responsive to celebrex but having discomfort over sacral ulcer  * Pain b/l sole of foot is minimal - await offloading shoes from orthotics   * Plastic surgery recs outpatient follow up for sacral wound management   * - will gradually start tapering gabapentin    # Critical illness myopathy due to respiratory failure and sepsis  (present on admission)   - Gait Instability, ADL impairments and Functional impairments: start Comprehensive Rehab Program of PT/OT  - 3 hours a day, 5 days a week   - PRN: Nebulizer QID     #BEA on BIPAP  --via nasal canula qhs    #Sacral Osteomyelitis  - Zosyn TID - completed on 5/12/25    #Paroxysmal AFIB  - Eliquis 5mg BID     #Calf Tenderness  - BL LE doppler negative 5/21     #L hip pain - resolved resolved   - no acute findings on XR or CT   - L hip MRI (5/7) no significant findings     #HTN  - Amlodipine 10mg dialy     #Sleep/Mood  - Melatonin 9mg at HS   - Quetiapine 25mg at HS     #Skin  Wound Recs per Plastic Surgery (5/16):  Sacral Pressure Injury: BID- Cleanse with NS, remove excess fluid, apply santyl ointment to sloughing tissues, pack wound with 1-inch dry plain packing, cover with 4 inch gauze and abd combine  - R buttock: BID- Cleanse with NS, overlay calcium alginate, ABD combine dressing  Additional wound care instructions:  -->Right anterior thigh to groin isolated wound: Clean wound and periwound skin with NS. Pat dry. Apply liquid barrier film to periwound skin, allow to dry. Cover with silicone foam with border. Change daily or PRN if soiled   --> Bilateral abdominal pannus, bilateral groin: Cleanse with luke-warm soap and water, dry well. Apply clotrimazole  --> Bilateral feet: Apply betadine  to bilateral foot wounds followed by 4x4 gauze, ABD pad, and matilde daily per podiatry.  --> Continue to offload pressure; bariatric low airloss support surface, turn and position per protocol with use of fluidized positioning devices, continue incontinence and moisture care per protocol and use of single breathable incontinence pads, continue to offload heels with fluidized positioning devices. Continue use of bariatric seat cushion, limit sit time- no more than 2 consecutive hours at any given time.  - Pressure injury/Skin: OOB to Chair, PT/OT    - Ammonium lactate to BL LEs     #Neuropathy  - Cymbalta 60mg daily --d/melly 5/28  - Gabapentin 1200mg TID     #Pain Mgmt   - Capsaicin cream to BL feet QID - Avoid use on damaged, broken, or irritated skin. Avoid occlusive dressing or heat   - Lidocaine patch to BL thighs   - PRN:; Oxycodone and Tylenol 650mg QID   - will gradually start tapering gabapentin    #Morbid obesity  - Most recent weight 394lbs (5/7)     #GI/Bowel Mgmt   - Pantoprazole  - Bisacodyl 10mg at HS   - Miralax   - Naloxegal 25mg daily     #/Bladder Mgmt   #ELLA  - USKB 5/20 negative   - Renvela 800mg TID    #FEN --Morbid obesity (BMI > 40).  - Diet - Regular + Thins  [CCHO]    - MVI     # Health Management:   Environmental challenge affecting dc plan--narrow door and need for ramp to house entrance  However, patient making goal directed effort towards therapy goals     #Precautions / PROPHYLAXIS:   - Falls  - ortho: Weight bearing status: WBAT in surgical shoe   - Lungs: Aspiration, Incentive Spirometer   - DVT: Lovenox  ----------------------------------------------  IDT 5/27 5/27--Therapy held today due to pain over sacral wound    Function as at 5/23  Stand pivot T/F during shower max x 2 persons  Pain b/l feet over areas with skin break on dorsum of foot, limiting wt bearing  Most transfers still  Sit to stand mod assist x 1 T/f bed to  Bussy board mod assist x 1  Took 4 steps on parallel bars with mo assist x 2  Making progress towards therapy goals  Barrier--ulcers on feet, limiting wt bearing, urinary hesitancy, environmental barrier at home (stairs), severe obesity  LE weakness  Overall making functional progress, has supportive family,  hence decision to extend DC date and achieve same home dc   Ext dc 6/6  (barriers as noted, making functional progress)  will discuss Home vs [facility] placement considering multiple issues--environmental barrier at home, large body habitus, difficulty with transfers    ----------------------------------------------  Dr. CANTU's Liaison with Family/Providers:  5/22--Patient's partner at bedside, gets regular updates on patient's clinical and functional progress  5/2--D/w orthotist Mr Dawn, consult for pressure offloading shoes both legs   ----------------------------------------------  OUTPATIENT/FOLLOW UP:    Your Primary Care Provider,   Phone: (   )    -  Fax: (   )    -  Follow Up Time: 1 week    [facility],   [address] [place] [address]  Phone: [phone]  Fax: (   )    -  Follow Up Time:    Dr. Waterhouse  Podiatry  [phone]  Within 1 week

## 2025-05-29 NOTE — PROGRESS NOTE ADULT - ASSESSMENT
38 y/o M with PMH of morbid obesity, BEA, pAFIB (not on AC), HTN, and BL papilledema attributed to pseudotumor cerebri; who initially presented to  on 2/24 with SOB and BL LE edema. Found to have URI with progressive SOB and multifocal PNA on imaging. His hospital course was complicated by worsening respiratory distress and increased O2 demands secondary to secretions (requiring multiple intubation attempts) and eventual MICU admission. While in MICU, his O2 requirements continued despite optimal vent management. Concern noted for progressive ARDS, unable to prone given morbid obesity, and ultimately cannulated for vvECMO on 2/25 and transferred to [facility] for further management on 2/26. His hospital course at [facility] was significant for the following: diuretic and ABX management, s/p trach and PEG (placed on 3/7- PEG since removed), RCU admission, high grade fevers (secondary to panniculitis), progressively worsening sacral ulcer (likely osteomyelitis- s/p surgical debridement), BL foot wound (secondary to pressor use), neuropathy (secondary to critical illness polyneuropathy), ELLA (secondary to vancomycin toxicity and overdiuresis- since DCd- with improvement). Patient now admitted for a multidisciplinary rehab program. 04-25-25     #Left hip pain - Improving  -Hip xray--> no fracture  -CT hip: Again seen is a sacral decubitus wound which extends superficial as well as involving the right gluteus musculature and the posterior L5 with small amount of fluid and gas within the tract. Limited involvement on this noncontrast CT. This likely corresponds to adjacent phlegmonous changes  -MRI left hip: Incompletely characterized sacral wound with associated ill-defined collection of fluid and soft tissue gas which extends to/involve the right gluteus zurdo muscle as at CT pelvis study from one day prior. No interval change in the high STIR signal within the bilateral gluteal and thigh musculature suggestive of a myositis as compared to prior MRI study. No fracture, osseous destruction or aggressive periosteal reaction. No osteomyelitis at the left hip. Edema-like signal along the right and left greater trochanteric regions, overall increased as compared to the prior MRI study from 4/11/2025. Findings are felt to be reactive in etiology.  -Continue comprehensive rehab program - PT/OT/SLP per rehab team  -Pain management, bowel regimen per rehab     #Abnormal CT A/P  -5/2: CT A/P: Question of pneumatosis in the cecum with no other evidence of bowel ischemia. Correlation with lactate levels and clinical exam would be helpful.  -Patient has no symptoms. Abdomen is benign. Tolerating PO  -5/2: Surgery cx noted: no intervention. c/w PO. refer to bariatric surgery post-DC dr botello at [facility] or Dr. Segovia at [facility] [per patient request]  -5/3 GI cx noted: no GI intervention planned. c/w PO    #Stage IV Sacral Decub Ulcer  #Bilateral Buttocks Wounds  #Bilateral Foot Wounds  -5/2 CT A/P: An air and fluid containing collection in the right gluteus zurdo muscle in continuity with a subcutaneous sacral collection. No obvious skin defect to suggest a decubitus ulcer.  -5/7: MRI left hip: Incompletely characterized sacral wound with associated ill-defined collection of fluid and soft tissue gas which extends to/involve the right gluteus zurdo muscle as at CT pelvis study from one day prior. No interval change in the high STIR signal within the bilateral gluteal and thigh musculature suggestive of a myositis as compared to prior MRI study. No fracture, osseous destruction or aggressive periosteal reaction. No osteomyelitis at the left hip. Edema-like signal along the right and left greater trochanteric regions, overall increased as compared to the prior MRI study from 4/11/2025. Findings are felt to be reactive in etiology..  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 RV is not well visualized, enlarged RV cavity size a/ reduced RV systolic function. No significant valvular disease.  No echocardiographic evidence of pulmonary hypertension  -s/p Trach (decannulated 3/28) and PEG (removed 4/15)   -Saturating well on RA  -Continue duoneb prn  -BIPAP qhs (FiO2 40%, 18/10) via nasal mask   -Fall precaution    #p-AFIB  #Sinus Tachycardia  -Patient has intermittent tachycardia. Per patient, His HR  mostly above 110 even when he is not sick. follows with cardio OP  -5/3: EKG: NSR@94 bpm  -Eliquis   -Off norvasc  -Continue metoprolol tartarate 50mg BID  -TSH WNL 2/25  -Sinus tachycardia likely multifactorial including pain and deconditioning. Low suspicion for PE, no SOB/hypoxia and he is already on full AC    #RIJ and Bilateral LE DVT  -Duplex RUE 3/14 showed Acute, non-occlusive deep vein thrombosis noted within the right internal jugular vein. Superficial vein thrombosis noted within the right antecubital cephalic vein  -Duplex LE 3/14 showed Acute, occlusive deep vein thromboses noted within the right and left peroneal veins at both mid calves. Age-indeterminate, occlusive deep vein thrombosis visualized within the left gastrocnemius vein at the proximal calf.  -Continue eliquis  -Repeat duplex 4/29 showed No evidence of deep venous thrombosis in either lower extremity.    #Normocytic Anemia   -Likely multifactorial  -H/H stable, no evidence of active bleed   -Monitor CBC     #HTN  -Amlodipine d/melly  -Metoprolol tartrate 50mg BID   -Monitor BP     #History of Pseudotumor Cerebri   -Outpatient follow up     #ELLA - resolved  -Baseline Cr appears to be ~0.8 range   -ELLA felt to be multifactorial in setting of cardiorenal w/ RVE, recurrent ELLA suspected due to vancomycin toxicity and diuresis   -Continue to encourage oral hydration   -Renvela   -Low phos diet  -Monitor BMP and phos level     #Type 2 Diabetes  -HbA1C 6.7 on 2/25, Repeat HbA1C 4.9 on 4/28  -FS trend reviewed, has been within goal  -Monitor glucose on routine lab, controlled     #Urinary Retention  -Flomax 5/1  -Renal u/s 5/20 unremarkable    #Mood  -Continue Seroquel    #DVT ppx - Eliquis  #GI ppx - PPI

## 2025-05-29 NOTE — PROGRESS NOTE ADULT - SUBJECTIVE AND OBJECTIVE BOX
Subjective  Patient seen and examined this AM. Observed out of bed.   Admits to sleeping well.   L hip pain improved today.   Sacral pain present, appears improving.     ROS--no acute pain, no fever or chest pain   B/l leg and sacral ulcers  LBM 5/28    Function 5/28  Recommence therapy today as tolerated  Observed during PT, tolerating same, ROM both ankles and legs    5/23 Ambulated 25', 15', 8' with bariatric RW needing MIN/MOD A x 2 and wheelchair  follow. Continues to use modified step to 3 point gait pattern (leading with  Right LE) and excessive offloading with UEs on device due to bilateral foot  pain.  -Distances limited by overall fatigue, UE fatigue, and worsening pain to feet  -Extended rest breaks provided to allow adequate recovery time between walking  trials.    Therapeutic Activity  -Sit to/from stand with bariatric RW needing MOD A x 2. Verbal cuing for proper  foot placement under wheelchair prior to standing attempt and anterior  rocking/weight shifting. Multiple sit to stands performed throughout gait  training.  -Patient returned to bed at end of session with use of overhead Nette system      Vital Signs Last 24 Hrs  T(C): 36.7 (29 May 2025 09:05), Max: 36.7 (28 May 2025 20:31)  T(F): 98 (29 May 2025 09:05), Max: 98 (28 May 2025 20:31)  HR: 99 (29 May 2025 09:05) (87 - 112)  BP: 105/69 (29 May 2025 09:05) (99/55 - 119/72)  BP(mean): --  RR: 16 (29 May 2025 09:05) (16 - 16)  SpO2: 94% (29 May 2025 09:05) (93% - 94%)      EXAM  Gen - mod discomfort with movement of left hip  HEENT - NAD  Neck - Supple, No limited ROM  Pulm - Clear  Cardiovascular - RRR, S1S2, No murmurs  Abdomen - Soft, non tender, +BS  Extremities - chronic skin changes with hyperpigmentation at shin, callus on feet      Neuro-  AAO X 3, Speech is normal     Motor - UE 5/5                    LEFT    LE - HF 3/5, KE 3/5 ankle 2/5                    RIGHT LE - HF 3/5, KE 3/5 ankle 2/5      Sensory - Decreased to light touch bilateral lower extremities      Coordination - impaired lower extremities   Psychiatric - Mood stable, Affect WNL    Skin:  R foot plantar aspect lateral/distal side 7cm x 5.5cm ischemic wound from pressors  RLE 4th digit 1x1.5cm ischemic induced wound from pressors  RLE 5th digit 3x2cm ischemic induced wound from pressors  L foot plantar aspect lateral/distal side 6x7cm ischemic induced wound from pressor usage  LLE 4th digit 2x1cm ischemic induced wound from pressors  LLE 5th digit 2.5x3cm ischemic induced wound from pressors   R groin skin tear 1x1.5cm  Unstageable pressure wound cocyx 7cmx4.5cm w/ 2.5cm depth  R buttock pressure induced wound semi contiguous with coccyx wound calling unstageable given prior debridement 8.5x5.5cm  L buttock 1.0x0.5cm stage 3 pressure injury  R buttock skin tear 3.5cm x 0.5cm and 1.5cmx0.5cm  Dry skin with callus on sole of foot     MMS--antigravity upper extremities, except left hip, limited by pain       LABS:                        9.1    7.53  )-----------( 495      ( 29 May 2025 06:54 )             31.0     05-29    143  |  104  |  8   ----------------------------<  90  3.5   |  32[H]  |  0.85    Ca    9.3      29 May 2025 06:54    TPro  7.0  /  Alb  1.9[L]  /  TBili  0.4  /  DBili  x   /  AST  16  /  ALT  9[L]  /  AlkPhos  46  05-29      Urinalysis Basic - ( 29 May 2025 06:54 )    Color: x / Appearance: x / SG: x / pH: x  Gluc: 90 mg/dL / Ketone: x  / Bili: x / Urobili: x   Blood: x / Protein: x / Nitrite: x   Leuk Esterase: x / RBC: x / WBC x   Sq Epi: x / Non Sq Epi: x / Bacteria: x          < from: US Duplex Venous Lower Ext Complete, Bilateral (05.21.25 @ 19:48) >  PROCEDURE DATE:  05/21/2025    INTERPRETATION:  CLINICAL INFORMATION: Calf tenderness  COMPARISON: 4/29/2025.    IMPRESSION:  Technically difficult study. No evidence of DVT bilateral lower   extremities as visualized.      MEDICATIONS  (STANDING):  ammonium lactate 12% Lotion 1 Application(s) Topical two times a day  apixaban 5 milliGRAM(s) Oral two times a day  ascorbic acid 500 milliGRAM(s) Oral daily  bisacodyl 10 milliGRAM(s) Oral at bedtime  capsaicin 0.025% Cream 1 Application(s) Topical four times a day  celecoxib 200 milliGRAM(s) Oral two times a day  clotrimazole 1% Cream 1 Application(s) Topical <User Schedule>  collagenase Ointment 1 Application(s) Topical two times a day  Dakins Solution - 1/4 Strength 1 Application(s) Topical two times a day  dextrose 5%. 1000 milliLiter(s) (100 mL/Hr) IV Continuous <Continuous>  dextrose 5%. 1000 milliLiter(s) (50 mL/Hr) IV Continuous <Continuous>  dextrose 50% Injectable 25 Gram(s) IV Push once  dextrose 50% Injectable 12.5 Gram(s) IV Push once  dextrose 50% Injectable 25 Gram(s) IV Push once  gabapentin 1200 milliGRAM(s) Oral every 8 hours  glucagon  Injectable 1 milliGRAM(s) IntraMuscular once  influenza   Vaccine 0.5 milliLiter(s) IntraMuscular once  lidocaine   4% Patch 1 Patch Transdermal daily  lidocaine   4% Patch 1 Patch Transdermal daily  magnesium oxide 400 milliGRAM(s) Oral three times a day with meals  melatonin 9 milliGRAM(s) Oral at bedtime  metoprolol tartrate 50 milliGRAM(s) Oral two times a day  multivitamin 1 Tablet(s) Oral daily  mupirocin 2% Ointment 1 Application(s) Topical two times a day  naloxegol 25 milliGRAM(s) Oral daily  pantoprazole    Tablet 40 milliGRAM(s) Oral before breakfast  petrolatum white Ointment 1 Application(s) Topical every 8 hours  polyethylene glycol 3350 17 Gram(s) Oral two times a day  povidone iodine 10% Solution 1 Application(s) Topical daily  QUEtiapine 25 milliGRAM(s) Oral at bedtime  sevelamer carbonate 800 milliGRAM(s) Oral three times a day with meals  silver nitrate Applicator 6 Application(s) Topical once  tamsulosin 0.8 milliGRAM(s) Oral at bedtime  zinc sulfate 220 milliGRAM(s) Oral daily    MEDICATIONS  (PRN):  albuterol/ipratropium for Nebulization 3 milliLiter(s) Nebulizer every 6 hours PRN Shortness of Breath and/or Wheezing  dextrose Oral Gel 15 Gram(s) Oral once PRN Blood Glucose LESS THAN 70 milliGRAM(s)/deciliter  oxyCODONE    IR 20 milliGRAM(s) Oral every 6 hours PRN Severe Pain (7 - 10)  oxyCODONE    IR 15 milliGRAM(s) Oral two times a day PRN Moderate Pain (4 - 6)  oxyCODONE    IR 10 milliGRAM(s) Oral two times a day PRN Mild Pain (1 - 3)     Subjective  Patient seen and examined this AM. Initially examined in bed and subsequently during therapy   Father present during review  Admits to sleeping well.   Reports resolution of left hip pain, but still experiencing discomfort over sacral wound   This has been d/w Plastics team, they will continue to follow and will also f/u out patient  No acute bleed   Reports that pain on sole of feet is significantly reduced     ROS--no acute pain, no fever or chest pain   B/l leg and sacral ulcers  LBM 5/28    Function 5/29  -Right/Left rolling in bed with use of bedrails needing Supervision for Nette  pad placement in preparation for OOB  -Bed to wheelchair transfer performed with overhead Nette lift and assist x 2  -Performed 5 pull to stands in parallel bars needing stand by assist of 2  (anterior blocking/guarding at knees and lateral guarding of second PT).  Tolerated approximately 2-3' intervals in standing prior sitting to rest.  Reports improvement in sacral pain in standing 6/10.  -Wheelchair to bed transfer with use of overhead Nette at end of session    Vital Signs Last 24 Hrs  T(C): 36.7 (29 May 2025 09:05), Max: 36.7 (28 May 2025 20:31)  T(F): 98 (29 May 2025 09:05), Max: 98 (28 May 2025 20:31)  HR: 99 (29 May 2025 09:05) (87 - 112)  BP: 105/69 (29 May 2025 09:05) (99/55 - 119/72)  RR: 16 (29 May 2025 09:05) (16 - 16)  SpO2: 94% (29 May 2025 09:05) (93% - 94%)    EXAM  Gen - Comfortable  HEENT - NAD  Neck - Supple, No limited ROM  Pulm - Clear  Cardiovascular - RRR, S1S2, No murmurs  Abdomen - Soft, non tender, +BS  Extremities - chronic skin changes with hyperpigmentation at shin, callus on feet      Neuro-  AAO X 3, Speech is normal     Motor - UE 5/5                    LEFT    LE - HF 3/5, KE 3/5 ankle 2/5                    RIGHT LE - HF 3/5, KE 3/5 ankle 2/5      Sensory - Decreased to light touch bilateral lower extremities      Coordination - impaired lower extremities   Psychiatric - Mood stable, Affect WNL    Skin:  R foot plantar aspect lateral/distal side 7cm x 5.5cm ischemic wound from pressors  RLE 4th digit 1x1.5cm ischemic induced wound from pressors  RLE 5th digit 3x2cm ischemic induced wound from pressors  L foot plantar aspect lateral/distal side 6x7cm ischemic induced wound from pressor usage  LLE 4th digit 2x1cm ischemic induced wound from pressors  LLE 5th digit 2.5x3cm ischemic induced wound from pressors   R groin skin tear 1x1.5cm  Unstageable pressure wound cocyx 7cmx4.5cm w/ 2.5cm depth  R buttock pressure induced wound semi contiguous with coccyx wound calling unstageable given prior debridement 8.5x5.5cm  L buttock 1.0x0.5cm stage 3 pressure injury  R buttock skin tear 3.5cm x 0.5cm and 1.5cmx0.5cm  Dry skin with callus on sole of foot     MMS--antigravity upper extremities,        LABS:                        9.1    7.53  )-----------( 495      ( 29 May 2025 06:54 )             31.0     05-29    143  |  104  |  8   ----------------------------<  90  3.5   |  32[H]  |  0.85    Ca    9.3      29 May 2025 06:54    TPro  7.0  /  Alb  1.9[L]  /  TBili  0.4  /  DBili  x   /  AST  16  /  ALT  9[L]  /  AlkPhos  46  05-29      Urinalysis Basic - ( 29 May 2025 06:54 )    Color: x / Appearance: x / SG: x / pH: x  Gluc: 90 mg/dL / Ketone: x  / Bili: x / Urobili: x   Blood: x / Protein: x / Nitrite: x   Leuk Esterase: x / RBC: x / WBC x   Sq Epi: x / Non Sq Epi: x / Bacteria: x          < from: US Duplex Venous Lower Ext Complete, Bilateral (05.21.25 @ 19:48) >  PROCEDURE DATE:  05/21/2025    INTERPRETATION:  CLINICAL INFORMATION: Calf tenderness  COMPARISON: 4/29/2025.    IMPRESSION:  Technically difficult study. No evidence of DVT bilateral lower   extremities as visualized.      MEDICATIONS  (STANDING):  ammonium lactate 12% Lotion 1 Application(s) Topical two times a day  apixaban 5 milliGRAM(s) Oral two times a day  ascorbic acid 500 milliGRAM(s) Oral daily  bisacodyl 10 milliGRAM(s) Oral at bedtime  capsaicin 0.025% Cream 1 Application(s) Topical four times a day  celecoxib 200 milliGRAM(s) Oral two times a day  clotrimazole 1% Cream 1 Application(s) Topical <User Schedule>  collagenase Ointment 1 Application(s) Topical two times a day  Dakins Solution - 1/4 Strength 1 Application(s) Topical two times a day  dextrose 5%. 1000 milliLiter(s) (100 mL/Hr) IV Continuous <Continuous>  dextrose 5%. 1000 milliLiter(s) (50 mL/Hr) IV Continuous <Continuous>  dextrose 50% Injectable 25 Gram(s) IV Push once  dextrose 50% Injectable 12.5 Gram(s) IV Push once  dextrose 50% Injectable 25 Gram(s) IV Push once  gabapentin 1200 milliGRAM(s) Oral every 8 hours  glucagon  Injectable 1 milliGRAM(s) IntraMuscular once  influenza   Vaccine 0.5 milliLiter(s) IntraMuscular once  lidocaine   4% Patch 1 Patch Transdermal daily  lidocaine   4% Patch 1 Patch Transdermal daily  magnesium oxide 400 milliGRAM(s) Oral three times a day with meals  melatonin 9 milliGRAM(s) Oral at bedtime  metoprolol tartrate 50 milliGRAM(s) Oral two times a day  multivitamin 1 Tablet(s) Oral daily  mupirocin 2% Ointment 1 Application(s) Topical two times a day  naloxegol 25 milliGRAM(s) Oral daily  pantoprazole    Tablet 40 milliGRAM(s) Oral before breakfast  petrolatum white Ointment 1 Application(s) Topical every 8 hours  polyethylene glycol 3350 17 Gram(s) Oral two times a day  povidone iodine 10% Solution 1 Application(s) Topical daily  QUEtiapine 25 milliGRAM(s) Oral at bedtime  sevelamer carbonate 800 milliGRAM(s) Oral three times a day with meals  silver nitrate Applicator 6 Application(s) Topical once  tamsulosin 0.8 milliGRAM(s) Oral at bedtime  zinc sulfate 220 milliGRAM(s) Oral daily    MEDICATIONS  (PRN):  albuterol/ipratropium for Nebulization 3 milliLiter(s) Nebulizer every 6 hours PRN Shortness of Breath and/or Wheezing  dextrose Oral Gel 15 Gram(s) Oral once PRN Blood Glucose LESS THAN 70 milliGRAM(s)/deciliter  oxyCODONE    IR 20 milliGRAM(s) Oral every 6 hours PRN Severe Pain (7 - 10)  oxyCODONE    IR 15 milliGRAM(s) Oral two times a day PRN Moderate Pain (4 - 6)  oxyCODONE    IR 10 milliGRAM(s) Oral two times a day PRN Mild Pain (1 - 3)

## 2025-05-29 NOTE — PROGRESS NOTE ADULT - SUBJECTIVE AND OBJECTIVE BOX
Patient is a 39y old  Male who presents with a chief complaint of Debility secondary to acute hypoxic respiratory failure (28 May 2025 11:06)      Patient seen and examined at bedside. NAD, no acute complaints. pain controlled this am.     ALLERGIES:  No Known Allergies    MEDICATIONS  (STANDING):  ammonium lactate 12% Lotion 1 Application(s) Topical two times a day  apixaban 5 milliGRAM(s) Oral two times a day  ascorbic acid 500 milliGRAM(s) Oral daily  bisacodyl 10 milliGRAM(s) Oral at bedtime  capsaicin 0.025% Cream 1 Application(s) Topical four times a day  celecoxib 200 milliGRAM(s) Oral two times a day  clotrimazole 1% Cream 1 Application(s) Topical <User Schedule>  collagenase Ointment 1 Application(s) Topical two times a day  Dakins Solution - 1/4 Strength 1 Application(s) Topical two times a day  dextrose 5%. 1000 milliLiter(s) (100 mL/Hr) IV Continuous <Continuous>  dextrose 5%. 1000 milliLiter(s) (50 mL/Hr) IV Continuous <Continuous>  dextrose 50% Injectable 25 Gram(s) IV Push once  dextrose 50% Injectable 12.5 Gram(s) IV Push once  dextrose 50% Injectable 25 Gram(s) IV Push once  gabapentin 1200 milliGRAM(s) Oral every 8 hours  glucagon  Injectable 1 milliGRAM(s) IntraMuscular once  influenza   Vaccine 0.5 milliLiter(s) IntraMuscular once  lidocaine   4% Patch 1 Patch Transdermal daily  lidocaine   4% Patch 1 Patch Transdermal daily  magnesium oxide 400 milliGRAM(s) Oral three times a day with meals  melatonin 9 milliGRAM(s) Oral at bedtime  metoprolol tartrate 50 milliGRAM(s) Oral two times a day  multivitamin 1 Tablet(s) Oral daily  mupirocin 2% Ointment 1 Application(s) Topical two times a day  naloxegol 25 milliGRAM(s) Oral daily  pantoprazole    Tablet 40 milliGRAM(s) Oral before breakfast  petrolatum white Ointment 1 Application(s) Topical every 8 hours  polyethylene glycol 3350 17 Gram(s) Oral two times a day  povidone iodine 10% Solution 1 Application(s) Topical daily  QUEtiapine 25 milliGRAM(s) Oral at bedtime  sevelamer carbonate 800 milliGRAM(s) Oral three times a day with meals  silver nitrate Applicator 6 Application(s) Topical once  tamsulosin 0.8 milliGRAM(s) Oral at bedtime  zinc sulfate 220 milliGRAM(s) Oral daily    MEDICATIONS  (PRN):  albuterol/ipratropium for Nebulization 3 milliLiter(s) Nebulizer every 6 hours PRN Shortness of Breath and/or Wheezing  dextrose Oral Gel 15 Gram(s) Oral once PRN Blood Glucose LESS THAN 70 milliGRAM(s)/deciliter  oxyCODONE    IR 20 milliGRAM(s) Oral every 6 hours PRN Severe Pain (7 - 10)  oxyCODONE    IR 15 milliGRAM(s) Oral two times a day PRN Moderate Pain (4 - 6)  oxyCODONE    IR 10 milliGRAM(s) Oral two times a day PRN Mild Pain (1 - 3)    Vital Signs Last 24 Hrs  T(F): 98 (28 May 2025 20:31), Max: 98.5 (28 May 2025 08:46)  HR: 102 (29 May 2025 05:11) (87 - 112)  BP: 99/55 (29 May 2025 05:11) (99/55 - 119/72)  RR: 16 (28 May 2025 20:31) (16 - 16)  SpO2: 93% (28 May 2025 20:31) (93% - 94%)  I&O's Summary      PHYSICAL EXAM:  General: NAD, A/O x 3, morbidly obese  ENT: MMM, no scleral icterus  Neck: Supple, No JVD  Lungs: Clear to auscultation bilaterally, no wheezes, rales, rhonchi  Cardio: RRR, S1/S2  Abdomen: Soft, Nontender, Nondistended; Bowel sounds present  Extremities: No calf tenderness, No pitting edema  Skin: dry, chronic LE changes with hyperpigmentation, callus on feet b/l    LABS:                        9.1    7.53  )-----------( 495      ( 29 May 2025 06:54 )             31.0       05-29    143  |  104  |  8   ----------------------------<  90  3.5   |  32  |  0.85    Ca    9.3      29 May 2025 06:54    TPro  7.0  /  Alb  1.9  /  TBili  0.4  /  DBili  x   /  AST  16  /  ALT  9   /  AlkPhos  46  05-29 03-08 Chol -- LDL -- HDL -- Trig 169 mg/dL                  Urinalysis Basic - ( 29 May 2025 06:54 )    Color: x / Appearance: x / SG: x / pH: x  Gluc: 90 mg/dL / Ketone: x  / Bili: x / Urobili: x   Blood: x / Protein: x / Nitrite: x   Leuk Esterase: x / RBC: x / WBC x   Sq Epi: x / Non Sq Epi: x / Bacteria: x            RADIOLOGY & ADDITIONAL TESTS:    Care Discussed with Consultants/Other Providers: yes, rehab

## 2025-05-30 PROCEDURE — 99232 SBSQ HOSP IP/OBS MODERATE 35: CPT

## 2025-05-30 RX ORDER — CELECOXIB 50 MG/1
100 CAPSULE ORAL
Refills: 0 | Status: DISCONTINUED | OUTPATIENT
Start: 2025-05-30 | End: 2025-06-09

## 2025-05-30 RX ORDER — OXYCODONE HYDROCHLORIDE 30 MG/1
15 TABLET ORAL
Refills: 0 | Status: DISCONTINUED | OUTPATIENT
Start: 2025-05-30 | End: 2025-06-06

## 2025-05-30 RX ORDER — OXYCODONE HYDROCHLORIDE 30 MG/1
20 TABLET ORAL EVERY 6 HOURS
Refills: 0 | Status: DISCONTINUED | OUTPATIENT
Start: 2025-05-30 | End: 2025-06-06

## 2025-05-30 RX ORDER — OXYCODONE HYDROCHLORIDE 30 MG/1
10 TABLET ORAL
Refills: 0 | Status: DISCONTINUED | OUTPATIENT
Start: 2025-05-30 | End: 2025-06-06

## 2025-05-30 RX ADMIN — WHITE PETROLATUM 1 APPLICATION(S): 1 OINTMENT TOPICAL at 06:13

## 2025-05-30 RX ADMIN — MUPIROCIN CALCIUM 1 APPLICATION(S): 20 CREAM TOPICAL at 18:43

## 2025-05-30 RX ADMIN — SEVELAMER HYDROCHLORIDE 800 MILLIGRAM(S): 800 TABLET ORAL at 18:41

## 2025-05-30 RX ADMIN — Medication 220 MILLIGRAM(S): at 12:49

## 2025-05-30 RX ADMIN — WHITE PETROLATUM 1 APPLICATION(S): 1 OINTMENT TOPICAL at 21:56

## 2025-05-30 RX ADMIN — TAMSULOSIN HYDROCHLORIDE 0.8 MILLIGRAM(S): 0.4 CAPSULE ORAL at 21:49

## 2025-05-30 RX ADMIN — METOPROLOL SUCCINATE 50 MILLIGRAM(S): 50 TABLET, EXTENDED RELEASE ORAL at 18:41

## 2025-05-30 RX ADMIN — Medication 400 MILLIGRAM(S): at 18:41

## 2025-05-30 RX ADMIN — Medication 1 APPLICATION(S): at 06:13

## 2025-05-30 RX ADMIN — Medication 10 MILLIGRAM(S): at 21:50

## 2025-05-30 RX ADMIN — CELECOXIB 200 MILLIGRAM(S): 50 CAPSULE ORAL at 07:08

## 2025-05-30 RX ADMIN — Medication 1 APPLICATION(S): at 12:51

## 2025-05-30 RX ADMIN — Medication 1 APPLICATION(S): at 06:14

## 2025-05-30 RX ADMIN — OXYCODONE HYDROCHLORIDE 15 MILLIGRAM(S): 30 TABLET ORAL at 18:50

## 2025-05-30 RX ADMIN — OXYCODONE HYDROCHLORIDE 20 MILLIGRAM(S): 30 TABLET ORAL at 13:28

## 2025-05-30 RX ADMIN — CELECOXIB 200 MILLIGRAM(S): 50 CAPSULE ORAL at 06:11

## 2025-05-30 RX ADMIN — GABAPENTIN 1200 MILLIGRAM(S): 400 CAPSULE ORAL at 13:21

## 2025-05-30 RX ADMIN — APIXABAN 5 MILLIGRAM(S): 2.5 TABLET, FILM COATED ORAL at 18:42

## 2025-05-30 RX ADMIN — CELECOXIB 100 MILLIGRAM(S): 50 CAPSULE ORAL at 18:43

## 2025-05-30 RX ADMIN — SEVELAMER HYDROCHLORIDE 800 MILLIGRAM(S): 800 TABLET ORAL at 07:52

## 2025-05-30 RX ADMIN — Medication 1 TABLET(S): at 12:48

## 2025-05-30 RX ADMIN — Medication 400 MILLIGRAM(S): at 12:48

## 2025-05-30 RX ADMIN — QUETIAPINE FUMARATE 25 MILLIGRAM(S): 25 TABLET ORAL at 21:49

## 2025-05-30 RX ADMIN — SEVELAMER HYDROCHLORIDE 800 MILLIGRAM(S): 800 TABLET ORAL at 12:48

## 2025-05-30 RX ADMIN — GABAPENTIN 1200 MILLIGRAM(S): 400 CAPSULE ORAL at 21:49

## 2025-05-30 RX ADMIN — SODIUM HYPOCHLORITE 1 APPLICATION(S): 0.12 SOLUTION TOPICAL at 18:43

## 2025-05-30 RX ADMIN — CELECOXIB 100 MILLIGRAM(S): 50 CAPSULE ORAL at 18:41

## 2025-05-30 RX ADMIN — MUPIROCIN CALCIUM 1 APPLICATION(S): 20 CREAM TOPICAL at 06:14

## 2025-05-30 RX ADMIN — Medication 1 APPLICATION(S): at 06:15

## 2025-05-30 RX ADMIN — OXYCODONE HYDROCHLORIDE 20 MILLIGRAM(S): 30 TABLET ORAL at 12:28

## 2025-05-30 RX ADMIN — CLOTRIMAZOLE 1 APPLICATION(S): 1 CREAM TOPICAL at 12:51

## 2025-05-30 RX ADMIN — SODIUM HYPOCHLORITE 1 APPLICATION(S): 0.12 SOLUTION TOPICAL at 06:13

## 2025-05-30 RX ADMIN — NALOXEGOL OXALATE 25 MILLIGRAM(S): 12.5 TABLET, FILM COATED ORAL at 12:48

## 2025-05-30 RX ADMIN — Medication 1 APPLICATION(S): at 18:42

## 2025-05-30 RX ADMIN — OXYCODONE HYDROCHLORIDE 20 MILLIGRAM(S): 30 TABLET ORAL at 21:52

## 2025-05-30 RX ADMIN — METOPROLOL SUCCINATE 50 MILLIGRAM(S): 50 TABLET, EXTENDED RELEASE ORAL at 06:10

## 2025-05-30 RX ADMIN — Medication 400 MILLIGRAM(S): at 07:52

## 2025-05-30 RX ADMIN — Medication 9 MILLIGRAM(S): at 21:48

## 2025-05-30 RX ADMIN — Medication 40 MILLIGRAM(S): at 06:10

## 2025-05-30 RX ADMIN — Medication 1 APPLICATION(S): at 18:43

## 2025-05-30 RX ADMIN — Medication 500 MILLIGRAM(S): at 12:48

## 2025-05-30 RX ADMIN — GABAPENTIN 1200 MILLIGRAM(S): 400 CAPSULE ORAL at 06:10

## 2025-05-30 RX ADMIN — APIXABAN 5 MILLIGRAM(S): 2.5 TABLET, FILM COATED ORAL at 06:11

## 2025-05-30 NOTE — PROGRESS NOTE ADULT - ASSESSMENT
40 y/o M with PMH of morbid obesity, BEA, pAFIB (not on AC), HTN, and BL papilledema attributed to pseudotumor cerebri; who initially presented to  on 2/24 with SOB and BL LE edema. Found to have URI with progressive SOB and multifocal PNA on imaging. His hospital course was complicated by worsening respiratory distress and increased O2 demands secondary to secretions (requiring multiple intubation attempts) and eventual MICU admission. While in MICU, his O2 requirements continued despite optimal vent management. Concern noted for progressive ARDS, unable to prone given morbid obesity, and ultimately cannulated for vvECMO on 2/25 and transferred to [facility] for further management on 2/26. His hospital course at [facility] was significant for the following: diuretic and ABX management, s/p trach and PEG (placed on 3/7- PEG since removed), RCU admission, high grade fevers (secondary to panniculitis), progressively worsening sacral ulcer (likely osteomyelitis- s/p surgical debridement), BL foot wound (secondary to pressor use), neuropathy (secondary to critical illness polyneuropathy), ELLA (secondary to vancomycin toxicity and overdiuresis- since DCd- with improvement). Patient now admitted for a multidisciplinary rehab program. 04-25-25     #Left hip pain - Improving  -Hip xray--> no fracture  -CT hip: Again seen is a sacral decubitus wound which extends superficial as well as involving the right gluteus musculature and the posterior L5 with small amount of fluid and gas within the tract. Limited involvement on this noncontrast CT. This likely corresponds to adjacent phlegmonous changes  -MRI left hip: Incompletely characterized sacral wound with associated ill-defined collection of fluid and soft tissue gas which extends to/involve the right gluteus zurdo muscle as at CT pelvis study from one day prior. No interval change in the high STIR signal within the bilateral gluteal and thigh musculature suggestive of a myositis as compared to prior MRI study. No fracture, osseous destruction or aggressive periosteal reaction. No osteomyelitis at the left hip. Edema-like signal along the right and left greater trochanteric regions, overall increased as compared to the prior MRI study from 4/11/2025. Findings are felt to be reactive in etiology.  -Continue comprehensive rehab program - PT/OT/SLP per rehab team  -Pain management, bowel regimen per rehab     #Abnormal CT A/P  -5/2: CT A/P: Question of pneumatosis in the cecum with no other evidence of bowel ischemia. Correlation with lactate levels and clinical exam would be helpful.  -Patient has no symptoms. Abdomen is benign. Tolerating PO  -5/2: Surgery cx noted: no intervention. c/w PO. refer to bariatric surgery post-DC dr botello at [facility] or Dr. Segovia at [facility] [per patient request]  -5/3 GI cx noted: no GI intervention planned. c/w PO    #Stage IV Sacral Decub Ulcer  #Bilateral Buttocks Wounds  #Bilateral Foot Wounds  -5/2 CT A/P: An air and fluid containing collection in the right gluteus zurdo muscle in continuity with a subcutaneous sacral collection. No obvious skin defect to suggest a decubitus ulcer.  -5/7: MRI left hip: Incompletely characterized sacral wound with associated ill-defined collection of fluid and soft tissue gas which extends to/involve the right gluteus zurdo muscle as at CT pelvis study from one day prior. No interval change in the high STIR signal within the bilateral gluteal and thigh musculature suggestive of a myositis as compared to prior MRI study. No fracture, osseous destruction or aggressive periosteal reaction. No osteomyelitis at the left hip. Edema-like signal along the right and left greater trochanteric regions, overall increased as compared to the prior MRI study from 4/11/2025. Findings are felt to be reactive in etiology..  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 RV is not well visualized, enlarged RV cavity size a/ reduced RV systolic function. No significant valvular disease.  No echocardiographic evidence of pulmonary hypertension  -s/p Trach (decannulated 3/28) and PEG (removed 4/15)   -Saturating well on RA  -Continue duoneb prn  -BIPAP qhs (FiO2 40%, 18/10) via nasal mask   -Fall precaution    #p-AFIB  #Sinus Tachycardia  -Patient has intermittent tachycardia. Per patient, His HR  mostly above 110 even when he is not sick. follows with cardio OP  -5/3: EKG: NSR@94 bpm  -Eliquis   -Off norvasc  -Continue metoprolol tartarate 50mg BID  -TSH WNL 2/25  -Sinus tachycardia likely multifactorial including pain and deconditioning. Low suspicion for PE, no SOB/hypoxia and he is already on full AC    #RIJ and Bilateral LE DVT  -Duplex RUE 3/14 showed Acute, non-occlusive deep vein thrombosis noted within the right internal jugular vein. Superficial vein thrombosis noted within the right antecubital cephalic vein  -Duplex LE 3/14 showed Acute, occlusive deep vein thromboses noted within the right and left peroneal veins at both mid calves. Age-indeterminate, occlusive deep vein thrombosis visualized within the left gastrocnemius vein at the proximal calf.  -Continue eliquis  -Repeat duplex 4/29 showed No evidence of deep venous thrombosis in either lower extremity.    #Normocytic Anemia   -Likely multifactorial  -H/H stable, no evidence of active bleed   -Monitor CBC     #HTN  -Amlodipine d/melly  -Metoprolol tartrate 50mg BID   -Monitor BP     #History of Pseudotumor Cerebri   -Outpatient follow up     #ELLA - resolved  -Baseline Cr appears to be ~0.8 range   -ELLA felt to be multifactorial in setting of cardiorenal w/ RVE, recurrent ELLA suspected due to vancomycin toxicity and diuresis   -Continue to encourage oral hydration   -Renvela   -Low phos diet  -Monitor BMP and phos level     #Type 2 Diabetes  -HbA1C 6.7 on 2/25, Repeat HbA1C 4.9 on 4/28  -FS trend reviewed, has been within goal  -Monitor glucose on routine lab, controlled     #Urinary Retention  -Flomax 5/1  -Renal u/s 5/20 unremarkable    #Mood  -Continue Seroquel    #DVT ppx - Eliquis  #GI ppx - PPI

## 2025-05-30 NOTE — PROGRESS NOTE ADULT - ASSESSMENT
39 year old male with PMH of morbid obesity, BEA on CPAP, pAFIB (not on AC prior to admission), HTN, and BL papilledema attributed to pseudotumor cerebri; who initially presented to  on 2/24 with SOB and BL LE edema. Found to have URI with progressive SOB and multifocal PNA on imaging. His hospital course was complicated by worsening respiratory distress and increased 02 demands secondary to secretions (requiring multiple intubation attempts) and eventual MICU admission. While in MICU, he 02 requirements continued despite optimal vent management. Concern noted for progressive ARDS, unable to prone given morbid obesity, and ultimately cannulated for vvECMO on 2/25 and transferred to [facility] for further management on 2/26.  His hospital course at [facility] was significant for the following: diuretic and ABX management, s/p trache and PEG (placed on 3/7- PEG since removed), RCU admission, high grade fevers (secondary to panniculitis), progressively worsening sacral ulcer (likely osteomyelitis- s/p surgical debridement), BL foot wound (secondary to pressor use), neuropathy (secondary to critical illness polyneuropathy), ELLA (secondary to vancomycin toxicity and overdiuresis- since DCd- with improvement). Patient now admitted for a multidisciplinary rehab program. 04-25-25 @ 15:19    * Left hip pain resolved, pain over sacral wound is mild--reduced celebrex to 100mg bid  * Pain b/l sole of foot is minimal - await offloading shoes from orthotics   * Plastic surgery recs outpatient follow up for sacral wound management   * Neuropathic pain both feet c/w gabapentin    # Critical illness myopathy due to respiratory failure and sepsis  (present on admission)   - Gait Instability, ADL impairments and Functional impairments: start Comprehensive Rehab Program of PT/OT  - 3 hours a day, 5 days a week   - PRN: Nebulizer QID     #BEA on BIPAP  --via nasal canula qhs    #Sacral Osteomyelitis  - Zosyn TID - completed on 5/12/25    #Paroxysmal AFIB  - Eliquis 5mg BID     #Calf Tenderness  - BL LE doppler negative 5/21     #L hip pain - resolved resolved   - no acute findings on XR or CT   - L hip MRI (5/7) no significant findings     #HTN  - Amlodipine 10mg dialy     #Sleep/Mood  - Melatonin 9mg at HS   - Quetiapine 25mg at HS     #Skin  Wound Recs per Plastic Surgery (5/16):  Sacral Pressure Injury: BID- Cleanse with NS, remove excess fluid, apply santyl ointment to sloughing tissues, pack wound with 1-inch dry plain packing, cover with 4 inch gauze and abd combine  - R buttock: BID- Cleanse with NS, overlay calcium alginate, ABD combine dressing  Additional wound care instructions:  -->Right anterior thigh to groin isolated wound: Clean wound and periwound skin with NS. Pat dry. Apply liquid barrier film to periwound skin, allow to dry. Cover with silicone foam with border. Change daily or PRN if soiled   --> Bilateral abdominal pannus, bilateral groin: Cleanse with luke-warm soap and water, dry well. Apply clotrimazole  --> Bilateral feet: Apply betadine  to bilateral foot wounds followed by 4x4 gauze, ABD pad, and matilde daily per podiatry.  --> Continue to offload pressure; bariatric low airloss support surface, turn and position per protocol with use of fluidized positioning devices, continue incontinence and moisture care per protocol and use of single breathable incontinence pads, continue to offload heels with fluidized positioning devices. Continue use of bariatric seat cushion, limit sit time- no more than 2 consecutive hours at any given time.  - Pressure injury/Skin: OOB to Chair, PT/OT    - Ammonium lactate to BL LEs     #Neuropathy  - Cymbalta 60mg daily --d/melly 5/28  - Gabapentin 1200mg TID     #Pain Mgmt   - Capsaicin cream to BL feet QID - Avoid use on damaged, broken, or irritated skin. Avoid occlusive dressing or heat   - Lidocaine patch to BL thighs   - PRN:; Oxycodone and Tylenol 650mg QID   -celebrex 100mg bid for sacral pain   - neuropathic pain b/l feet c/w   gabapentin    #Morbid obesity  - Most recent weight 394lbs (5/7)     #GI/Bowel Mgmt   - Pantoprazole  - Bisacodyl 10mg at HS   - Miralax   - Naloxegal 25mg daily     #/Bladder Mgmt   #ELLA  - USKB 5/20 negative   - Renvela 800mg TID    #FEN --Morbid obesity (BMI > 40).  - [facility] - [facility] + [facility]    - MVI     # Health Management:   Environmental challenge affecting dc plan--narrow door and need for ramp to house entrance  However, patient making goal directed effort towards therapy goals     #Precautions / PROPHYLAXIS:   - Falls  - ortho: Weight bearing status: WBAT in surgical shoe   - Lungs: Aspiration, Incentive Spirometer   - DVT: Lovenox  ----------------------------------------------      Function as at 5/27  Stand pivot T/F during shower max x 2 persons  Pain b/l feet over areas with skin break on dorsum of foot, limiting wt bearing  Most transfers still  Sit to stand mod assist x 1 T/f bed to  Bussy board mod assist x 1  Took 4 steps on parallel bars with mo assist x 2  Making progress towards therapy goals  Barrier--ulcers on feet, limiting wt bearing, urinary hesitancy, environmental barrier at home (stairs), severe obesity  LE weakness  Overall making functional progress, has supportive family,  hence decision to extend DC date and achieve same home dc   Ext dc 6/6  (barriers as noted, making functional progress)  will discuss Home vs [facility] placement considering multiple issues--environmental barrier at home, large body habitus, difficulty with transfers    ----------------------------------------------  Dr. CANTU's Liaison with Family/Providers:  5/22--Patient's partner at bedside, gets regular updates on patient's clinical and functional progress  5/2--D/w orthotist Mr Dawn, consult for pressure offloading shoes both legs   ----------------------------------------------  OUTPATIENT/FOLLOW UP:    Your Primary Care Provider,   Phone: (   )    -  Fax: (   )    -  Follow Up Time: 1 week    [facility] [place],   [address] [place] [address]  Phone: [phone]  Fax: (   )    -  Follow Up Time:    Dr. Waterhouse  Podiatry  [phone]  Within 1 week

## 2025-05-30 NOTE — PROGRESS NOTE ADULT - SUBJECTIVE AND OBJECTIVE BOX
Patient is a 39y old  Male who presents with a chief complaint of Debility secondary to acute hypoxic respiratory failure (29 May 2025 11:09)      Patient seen and examined at bedside, stable, NAD. denies headache, fever, chills, cp, sob, n/v, abd pain.     ALLERGIES:  No Known Allergies    MEDICATIONS  (STANDING):  ammonium lactate 12% Lotion 1 Application(s) Topical two times a day  apixaban 5 milliGRAM(s) Oral two times a day  ascorbic acid 500 milliGRAM(s) Oral daily  bisacodyl 10 milliGRAM(s) Oral at bedtime  capsaicin 0.025% Cream 1 Application(s) Topical four times a day  celecoxib 200 milliGRAM(s) Oral two times a day  clotrimazole 1% Cream 1 Application(s) Topical <User Schedule>  collagenase Ointment 1 Application(s) Topical two times a day  Dakins Solution - 1/4 Strength 1 Application(s) Topical two times a day  dextrose 5%. 1000 milliLiter(s) (50 mL/Hr) IV Continuous <Continuous>  dextrose 5%. 1000 milliLiter(s) (100 mL/Hr) IV Continuous <Continuous>  dextrose 50% Injectable 25 Gram(s) IV Push once  dextrose 50% Injectable 12.5 Gram(s) IV Push once  dextrose 50% Injectable 25 Gram(s) IV Push once  gabapentin 1200 milliGRAM(s) Oral every 8 hours  glucagon  Injectable 1 milliGRAM(s) IntraMuscular once  influenza   Vaccine 0.5 milliLiter(s) IntraMuscular once  lidocaine   4% Patch 1 Patch Transdermal daily  lidocaine   4% Patch 1 Patch Transdermal daily  magnesium oxide 400 milliGRAM(s) Oral three times a day with meals  melatonin 9 milliGRAM(s) Oral at bedtime  metoprolol tartrate 50 milliGRAM(s) Oral two times a day  multivitamin 1 Tablet(s) Oral daily  mupirocin 2% Ointment 1 Application(s) Topical two times a day  naloxegol 25 milliGRAM(s) Oral daily  pantoprazole    Tablet 40 milliGRAM(s) Oral before breakfast  petrolatum white Ointment 1 Application(s) Topical every 8 hours  polyethylene glycol 3350 17 Gram(s) Oral two times a day  povidone iodine 10% Solution 1 Application(s) Topical daily  QUEtiapine 25 milliGRAM(s) Oral at bedtime  sevelamer carbonate 800 milliGRAM(s) Oral three times a day with meals  silver nitrate Applicator 6 Application(s) Topical once  tamsulosin 0.8 milliGRAM(s) Oral at bedtime  zinc sulfate 220 milliGRAM(s) Oral daily    MEDICATIONS  (PRN):  albuterol/ipratropium for Nebulization 3 milliLiter(s) Nebulizer every 6 hours PRN Shortness of Breath and/or Wheezing  dextrose Oral Gel 15 Gram(s) Oral once PRN Blood Glucose LESS THAN 70 milliGRAM(s)/deciliter  oxyCODONE    IR 10 milliGRAM(s) Oral two times a day PRN Mild Pain (1 - 3)  oxyCODONE    IR 20 milliGRAM(s) Oral every 6 hours PRN Severe Pain (7 - 10)  oxyCODONE    IR 15 milliGRAM(s) Oral two times a day PRN Moderate Pain (4 - 6)    Vital Signs Last 24 Hrs  T(F): 97.7 (30 May 2025 07:48), Max: 98 (29 May 2025 09:05)  HR: 79 (30 May 2025 07:48) (79 - 102)  BP: 115/77 (30 May 2025 07:48) (105/69 - 121/76)  RR: 16 (30 May 2025 07:48) (16 - 16)  SpO2: 94% (30 May 2025 07:48) (94% - 96%)  I&O's Summary    PHYSICAL EXAM:  General: NAD, A/O x 3, morbidly obese  ENT: MMM, no scleral icterus  Neck: Supple, No JVD  Lungs: Clear to auscultation bilaterally, no wheezes, rales, rhonchi  Cardio: RRR, S1/S2  Abdomen: Soft, Nontender, Nondistended; Bowel sounds present  Extremities: No calf tenderness, No pitting edema  Skin: dry, chronic LE changes with hyperpigmentation, callus on feet b/l    LABS:                        9.1    7.53  )-----------( 495      ( 29 May 2025 06:54 )             31.0       05-29    143  |  104  |  8   ----------------------------<  90  3.5   |  32  |  0.85    Ca    9.3      29 May 2025 06:54    TPro  7.0  /  Alb  1.9  /  TBili  0.4  /  DBili  x   /  AST  16  /  ALT  9   /  AlkPhos  46  05-29 03-08 Chol -- LDL -- HDL -- Trig 169 mg/dL                  Urinalysis Basic - ( 29 May 2025 06:54 )    Color: x / Appearance: x / SG: x / pH: x  Gluc: 90 mg/dL / Ketone: x  / Bili: x / Urobili: x   Blood: x / Protein: x / Nitrite: x   Leuk Esterase: x / RBC: x / WBC x   Sq Epi: x / Non Sq Epi: x / Bacteria: x            RADIOLOGY & ADDITIONAL TESTS:    Care Discussed with Consultants/Other Providers: yes, rehab

## 2025-05-30 NOTE — PROGRESS NOTE ADULT - SUBJECTIVE AND OBJECTIVE BOX
Subjective  Patient seen and examined this AM. Sleeping prior to review  Reports good night rest   Reports no pain left him and reduced pain over sacrum  Tolerated therapy yesterday  Partly engaging this time, especially during exam of DF both ankles  However OT confirmed that patient has good DF strength  Therapist to check during therapy today    SW will be discussion insurance issues with patient     ROS--no acute pain, no fever or chest pain   B/l leg and sacral ulcers  LBM 5/29    Function 5/29  Pt tolerated 30 min makeup session fair, pt appears improved comfort in w/c  today from yesterday however c/o 8-10 pain sacral wound through out but able to  participate as tolerated. Pt left in w/c at bedside CBWR family present    interval pain over sacrum and left hip limited engaged in last two days,but now resolving       Vital Signs Last 24 Hrs  T(C): 36.5 (30 May 2025 07:48), Max: 36.7 (29 May 2025 19:30)  T(F): 97.7 (30 May 2025 07:48), Max: 98 (29 May 2025 19:30)  HR: 79 (30 May 2025 07:48) (79 - 102)  BP: 115/77 (30 May 2025 07:48) (109/66 - 121/76)  RR: 16 (30 May 2025 07:48) (16 - 16)  SpO2: 94% (30 May 2025 07:48) (94% - 96%)  O2 Parameters below as of 30 May 2025 07:48  Patient On (Oxygen Delivery Method): room air      EXAM  Gen - Comfortable  HEENT - NAD  Neck - Supple, No limited ROM  Pulm - Clear  Cardiovascular - RRR, S1S2, No murmurs  Abdomen - Soft, non tender, +BS  Extremities - chronic skin changes with hyperpigmentation at shin, callus on feet    Neuro-  AAO X 3, Speech is normal     Motor - UE 5/5                    LEFT    LE - HF 3/5, KE 3/5 ankle 2/5                    RIGHT LE - HF 3/5, KE 3/5 ankle 2/5      Sensory - Decreased to light touch bilateral lower extremities      Coordination - impaired lower extremities   Psychiatric - Mood stable, Affect WNL    Skin:  R foot plantar aspect lateral/distal side 7cm x 5.5cm ischemic wound from pressors  RLE 4th digit 1x1.5cm ischemic induced wound from pressors  RLE 5th digit 3x2cm ischemic induced wound from pressors  L foot plantar aspect lateral/distal side 6x7cm ischemic induced wound from pressor usage  LLE 4th digit 2x1cm ischemic induced wound from pressors  LLE 5th digit 2.5x3cm ischemic induced wound from pressors   R groin skin tear 1x1.5cm  Unstageable pressure wound cocyx 7cmx4.5cm w/ 2.5cm depth  R buttock pressure induced wound semi contiguous with coccyx wound calling unstageable given prior debridement 8.5x5.5cm  L buttock 1.0x0.5cm stage 3 pressure injury  R buttock skin tear 3.5cm x 0.5cm and 1.5cmx0.5cm  Dry skin with callus on sole of foot     MMS--antigravity upper extremities,        LABS:                        9.1    7.53  )-----------( 495      ( 29 May 2025 06:54 )             31.0     05-29    143  |  104  |  8   ----------------------------<  90  3.5   |  32[H]  |  0.85    Ca    9.3      29 May 2025 06:54    TPro  7.0  /  Alb  1.9[L]  /  TBili  0.4  /  DBili  x   /  AST  16  /  ALT  9[L]  /  AlkPhos  46  05-29      Urinalysis Basic - ( 29 May 2025 06:54 )    Color: x / Appearance: x / SG: x / pH: x  Gluc: 90 mg/dL / Ketone: x  / Bili: x / Urobili: x   Blood: x / Protein: x / Nitrite: x   Leuk Esterase: x / RBC: x / WBC x   Sq Epi: x / Non Sq Epi: x / Bacteria: x      < from: US Duplex Venous Lower Ext Complete, Bilateral (05.21.25 @ 19:48) >  PROCEDURE DATE:  05/21/2025    INTERPRETATION:  CLINICAL INFORMATION: Calf tenderness  COMPARISON: 4/29/2025.    IMPRESSION:  Technically difficult study. No evidence of DVT bilateral lower   extremities as visualized.      MEDICATIONS  (STANDING):  ammonium lactate 12% Lotion 1 Application(s) Topical two times a day  apixaban 5 milliGRAM(s) Oral two times a day  ascorbic acid 500 milliGRAM(s) Oral daily  bisacodyl 10 milliGRAM(s) Oral at bedtime  capsaicin 0.025% Cream 1 Application(s) Topical four times a day  celecoxib 100 milliGRAM(s) Oral two times a day  clotrimazole 1% Cream 1 Application(s) Topical <User Schedule>  collagenase Ointment 1 Application(s) Topical two times a day  Dakins Solution - 1/4 Strength 1 Application(s) Topical two times a day  dextrose 5%. 1000 milliLiter(s) (50 mL/Hr) IV Continuous <Continuous>  dextrose 5%. 1000 milliLiter(s) (100 mL/Hr) IV Continuous <Continuous>  dextrose 50% Injectable 25 Gram(s) IV Push once  dextrose 50% Injectable 12.5 Gram(s) IV Push once  dextrose 50% Injectable 25 Gram(s) IV Push once  gabapentin 1200 milliGRAM(s) Oral every 8 hours  glucagon  Injectable 1 milliGRAM(s) IntraMuscular once  influenza   Vaccine 0.5 milliLiter(s) IntraMuscular once  lidocaine   4% Patch 1 Patch Transdermal daily  lidocaine   4% Patch 1 Patch Transdermal daily  magnesium oxide 400 milliGRAM(s) Oral three times a day with meals  melatonin 9 milliGRAM(s) Oral at bedtime  metoprolol tartrate 50 milliGRAM(s) Oral two times a day  multivitamin 1 Tablet(s) Oral daily  mupirocin 2% Ointment 1 Application(s) Topical two times a day  naloxegol 25 milliGRAM(s) Oral daily  pantoprazole    Tablet 40 milliGRAM(s) Oral before breakfast  petrolatum white Ointment 1 Application(s) Topical every 8 hours  polyethylene glycol 3350 17 Gram(s) Oral two times a day  povidone iodine 10% Solution 1 Application(s) Topical daily  QUEtiapine 25 milliGRAM(s) Oral at bedtime  sevelamer carbonate 800 milliGRAM(s) Oral three times a day with meals  silver nitrate Applicator 6 Application(s) Topical once  tamsulosin 0.8 milliGRAM(s) Oral at bedtime  zinc sulfate 220 milliGRAM(s) Oral daily    MEDICATIONS  (PRN):  albuterol/ipratropium for Nebulization 3 milliLiter(s) Nebulizer every 6 hours PRN Shortness of Breath and/or Wheezing  dextrose Oral Gel 15 Gram(s) Oral once PRN Blood Glucose LESS THAN 70 milliGRAM(s)/deciliter  oxyCODONE    IR 10 milliGRAM(s) Oral two times a day PRN Mild Pain (1 - 3)  oxyCODONE    IR 20 milliGRAM(s) Oral every 6 hours PRN Severe Pain (7 - 10)  oxyCODONE    IR 15 milliGRAM(s) Oral two times a day PRN Moderate Pain (4 - 6)

## 2025-05-31 PROCEDURE — 99232 SBSQ HOSP IP/OBS MODERATE 35: CPT

## 2025-05-31 RX ADMIN — Medication 1 APPLICATION(S): at 19:04

## 2025-05-31 RX ADMIN — CELECOXIB 100 MILLIGRAM(S): 50 CAPSULE ORAL at 19:02

## 2025-05-31 RX ADMIN — CELECOXIB 100 MILLIGRAM(S): 50 CAPSULE ORAL at 05:26

## 2025-05-31 RX ADMIN — GABAPENTIN 1200 MILLIGRAM(S): 400 CAPSULE ORAL at 13:53

## 2025-05-31 RX ADMIN — MUPIROCIN CALCIUM 1 APPLICATION(S): 20 CREAM TOPICAL at 19:04

## 2025-05-31 RX ADMIN — Medication 1 APPLICATION(S): at 19:05

## 2025-05-31 RX ADMIN — Medication 400 MILLIGRAM(S): at 08:15

## 2025-05-31 RX ADMIN — OXYCODONE HYDROCHLORIDE 20 MILLIGRAM(S): 30 TABLET ORAL at 21:49

## 2025-05-31 RX ADMIN — SEVELAMER HYDROCHLORIDE 800 MILLIGRAM(S): 800 TABLET ORAL at 19:03

## 2025-05-31 RX ADMIN — Medication 9 MILLIGRAM(S): at 22:20

## 2025-05-31 RX ADMIN — GABAPENTIN 1200 MILLIGRAM(S): 400 CAPSULE ORAL at 22:20

## 2025-05-31 RX ADMIN — OXYCODONE HYDROCHLORIDE 20 MILLIGRAM(S): 30 TABLET ORAL at 08:19

## 2025-05-31 RX ADMIN — METOPROLOL SUCCINATE 50 MILLIGRAM(S): 50 TABLET, EXTENDED RELEASE ORAL at 05:27

## 2025-05-31 RX ADMIN — NALOXEGOL OXALATE 25 MILLIGRAM(S): 12.5 TABLET, FILM COATED ORAL at 12:09

## 2025-05-31 RX ADMIN — Medication 40 MILLIGRAM(S): at 05:27

## 2025-05-31 RX ADMIN — Medication 400 MILLIGRAM(S): at 12:09

## 2025-05-31 RX ADMIN — Medication 400 MILLIGRAM(S): at 19:03

## 2025-05-31 RX ADMIN — METOPROLOL SUCCINATE 50 MILLIGRAM(S): 50 TABLET, EXTENDED RELEASE ORAL at 19:04

## 2025-05-31 RX ADMIN — QUETIAPINE FUMARATE 25 MILLIGRAM(S): 25 TABLET ORAL at 22:20

## 2025-05-31 RX ADMIN — Medication 1 APPLICATION(S): at 05:30

## 2025-05-31 RX ADMIN — Medication 1 APPLICATION(S): at 12:08

## 2025-05-31 RX ADMIN — Medication 500 MILLIGRAM(S): at 12:08

## 2025-05-31 RX ADMIN — APIXABAN 5 MILLIGRAM(S): 2.5 TABLET, FILM COATED ORAL at 19:03

## 2025-05-31 RX ADMIN — SODIUM HYPOCHLORITE 1 APPLICATION(S): 0.12 SOLUTION TOPICAL at 19:04

## 2025-05-31 RX ADMIN — OXYCODONE HYDROCHLORIDE 15 MILLIGRAM(S): 30 TABLET ORAL at 13:55

## 2025-05-31 RX ADMIN — WHITE PETROLATUM 1 APPLICATION(S): 1 OINTMENT TOPICAL at 05:37

## 2025-05-31 RX ADMIN — APIXABAN 5 MILLIGRAM(S): 2.5 TABLET, FILM COATED ORAL at 05:27

## 2025-05-31 RX ADMIN — OXYCODONE HYDROCHLORIDE 20 MILLIGRAM(S): 30 TABLET ORAL at 21:19

## 2025-05-31 RX ADMIN — Medication 1 APPLICATION(S): at 12:07

## 2025-05-31 RX ADMIN — SEVELAMER HYDROCHLORIDE 800 MILLIGRAM(S): 800 TABLET ORAL at 08:15

## 2025-05-31 RX ADMIN — GABAPENTIN 1200 MILLIGRAM(S): 400 CAPSULE ORAL at 05:26

## 2025-05-31 RX ADMIN — Medication 220 MILLIGRAM(S): at 12:09

## 2025-05-31 RX ADMIN — Medication 1 TABLET(S): at 12:09

## 2025-05-31 RX ADMIN — WHITE PETROLATUM 1 APPLICATION(S): 1 OINTMENT TOPICAL at 22:11

## 2025-05-31 RX ADMIN — SEVELAMER HYDROCHLORIDE 800 MILLIGRAM(S): 800 TABLET ORAL at 12:08

## 2025-05-31 RX ADMIN — TAMSULOSIN HYDROCHLORIDE 0.8 MILLIGRAM(S): 0.4 CAPSULE ORAL at 22:20

## 2025-05-31 NOTE — PROGRESS NOTE ADULT - ASSESSMENT
38 y/o M with PMH of morbid obesity, BEA, pAFIB (not on AC), HTN, and BL papilledema attributed to pseudotumor cerebri; who initially presented to  on 2/24 with SOB and BL LE edema. Found to have URI with progressive SOB and multifocal PNA on imaging. His hospital course was complicated by worsening respiratory distress and increased O2 demands secondary to secretions (requiring multiple intubation attempts) and eventual MICU admission. While in MICU, his O2 requirements continued despite optimal vent management. Concern noted for progressive ARDS, unable to prone given morbid obesity, and ultimately cannulated for vvECMO on 2/25 and transferred to [facility] for further management on 2/26. His hospital course at [facility] was significant for the following: diuretic and ABX management, s/p trach and PEG (placed on 3/7- PEG since removed), RCU admission, high grade fevers (secondary to panniculitis), progressively worsening sacral ulcer (likely osteomyelitis- s/p surgical debridement), BL foot wound (secondary to pressor use), neuropathy (secondary to critical illness polyneuropathy), ELLA (secondary to vancomycin toxicity and overdiuresis- since DCd- with improvement). Patient now admitted for a multidisciplinary rehab program. 04-25-25     #Left hip pain - Improving  -Hip xray--> no fracture  -CT hip: Again seen is a sacral decubitus wound which extends superficial as well as involving the right gluteus musculature and the posterior L5 with small amount of fluid and gas within the tract. Limited involvement on this noncontrast CT. This likely corresponds to adjacent phlegmonous changes  -MRI left hip: Incompletely characterized sacral wound with associated ill-defined collection of fluid and soft tissue gas which extends to/involve the right gluteus zurdo muscle as at CT pelvis study from one day prior. No interval change in the high STIR signal within the bilateral gluteal and thigh musculature suggestive of a myositis as compared to prior MRI study. No fracture, osseous destruction or aggressive periosteal reaction. No osteomyelitis at the left hip. Edema-like signal along the right and left greater trochanteric regions, overall increased as compared to the prior MRI study from 4/11/2025. Findings are felt to be reactive in etiology.  -Continue comprehensive rehab program - PT/OT/SLP per rehab team  -Pain management, bowel regimen per rehab     #Abnormal CT A/P  -5/2: CT A/P: Question of pneumatosis in the cecum with no other evidence of bowel ischemia. Correlation with lactate levels and clinical exam would be helpful.  -Patient has no symptoms. Abdomen is benign. Tolerating PO  -5/2: Surgery cx noted: no intervention. c/w PO. refer to bariatric surgery post-DC dr botello at [place] or Dr. Segovia at [place] [per patient request]  -5/3 GI cx noted: no GI intervention planned. c/w PO    #Stage IV Sacral Decub Ulcer  #Bilateral Buttocks Wounds  #Bilateral Foot Wounds  -5/2 CT A/P: An air and fluid containing collection in the right gluteus zurdo muscle in continuity with a subcutaneous sacral collection. No obvious skin defect to suggest a decubitus ulcer.  -5/7: MRI left hip: Incompletely characterized sacral wound with associated ill-defined collection of fluid and soft tissue gas which extends to/involve the right gluteus zurdo muscle as at CT pelvis study from one day prior. No interval change in the high STIR signal within the bilateral gluteal and thigh musculature suggestive of a myositis as compared to prior MRI study. No fracture, osseous destruction or aggressive periosteal reaction. No osteomyelitis at the left hip. Edema-like signal along the right and left greater trochanteric regions, overall increased as compared to the prior MRI study from 4/11/2025. Findings are felt to be reactive in etiology..  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 RV is not well visualized, enlarged RV cavity size a/ reduced RV systolic function. No significant valvular disease.  No echocardiographic evidence of pulmonary hypertension  -s/p Trach (decannulated 3/28) and PEG (removed 4/15)   -Saturating well on RA  -Continue duoneb prn  -BIPAP qhs (FiO2 40%, 18/10) via nasal mask   -Fall precaution    #p-AFIB  #Sinus Tachycardia  -Patient has intermittent tachycardia. Per patient, His HR  mostly above 110 even when he is not sick. follows with cardio OP  -5/3: EKG: NSR@94 bpm  -Eliquis   -Off norvasc  -Continue metoprolol tartarate 50mg BID  -TSH WNL 2/25  -Sinus tachycardia likely multifactorial including pain and deconditioning. Low suspicion for PE, no SOB/hypoxia and he is already on full AC    #RIJ and Bilateral LE DVT  -Duplex RUE 3/14 showed Acute, non-occlusive deep vein thrombosis noted within the right internal jugular vein. Superficial vein thrombosis noted within the right antecubital cephalic vein  -Duplex LE 3/14 showed Acute, occlusive deep vein thromboses noted within the right and left peroneal veins at both mid calves. Age-indeterminate, occlusive deep vein thrombosis visualized within the left gastrocnemius vein at the proximal calf.  -Continue eliquis  -Repeat duplex 4/29 showed No evidence of deep venous thrombosis in either lower extremity.    #Normocytic Anemia   -Likely multifactorial  -H/H stable, no evidence of active bleed   -Monitor CBC     #HTN  -Amlodipine d/melly  -Metoprolol tartrate 50mg BID   -Monitor BP     #History of Pseudotumor Cerebri   -Outpatient follow up     #ELLA - resolved  -Baseline Cr appears to be ~0.8 range   -ELLA felt to be multifactorial in setting of cardiorenal w/ RVE, recurrent ELLA suspected due to vancomycin toxicity and diuresis   -Continue to encourage oral hydration   -Renvela   -Low phos diet  -Monitor BMP and phos level     #Type 2 Diabetes  -HbA1C 6.7 on 2/25, Repeat HbA1C 4.9 on 4/28  -FS trend reviewed, has been within goal  -Monitor glucose on routine lab, controlled     #Urinary Retention  -Flomax 5/1  -Renal u/s 5/20 unremarkable    #Mood  -Continue Seroquel    #DVT ppx - Eliquis  #GI ppx - PPI

## 2025-05-31 NOTE — PROGRESS NOTE ADULT - SUBJECTIVE AND OBJECTIVE BOX
Cc: Gait dysfunction    HPI: Mild calf aching, patient feels this is related to exercise.  Pain controlled, no chest pain, no shortness of breath, no N/V, no Fevers/Chills. No other new ROS  Has been tolerating rehabilitation program.  Per nursing, drainage consistent, unchanged. Frequent dressing changes.    albuterol/ipratropium for Nebulization 3 milliLiter(s) Nebulizer every 6 hours PRN  ammonium lactate 12% Lotion 1 Application(s) Topical two times a day  apixaban 5 milliGRAM(s) Oral two times a day  ascorbic acid 500 milliGRAM(s) Oral daily  bisacodyl 10 milliGRAM(s) Oral at bedtime  capsaicin 0.025% Cream 1 Application(s) Topical four times a day  celecoxib 100 milliGRAM(s) Oral two times a day  clotrimazole 1% Cream 1 Application(s) Topical <User Schedule>  collagenase Ointment 1 Application(s) Topical two times a day  Dakins Solution - 1/4 Strength 1 Application(s) Topical two times a day  dextrose 5%. 1000 milliLiter(s) IV Continuous <Continuous>  dextrose 5%. 1000 milliLiter(s) IV Continuous <Continuous>  dextrose 50% Injectable 25 Gram(s) IV Push once  dextrose 50% Injectable 12.5 Gram(s) IV Push once  dextrose 50% Injectable 25 Gram(s) IV Push once  dextrose Oral Gel 15 Gram(s) Oral once PRN  gabapentin 1200 milliGRAM(s) Oral every 8 hours  glucagon  Injectable 1 milliGRAM(s) IntraMuscular once  influenza   Vaccine 0.5 milliLiter(s) IntraMuscular once  lidocaine   4% Patch 1 Patch Transdermal daily  lidocaine   4% Patch 1 Patch Transdermal daily  magnesium oxide 400 milliGRAM(s) Oral three times a day with meals  melatonin 9 milliGRAM(s) Oral at bedtime  metoprolol tartrate 50 milliGRAM(s) Oral two times a day  multivitamin 1 Tablet(s) Oral daily  mupirocin 2% Ointment 1 Application(s) Topical two times a day  naloxegol 25 milliGRAM(s) Oral daily  oxyCODONE    IR 20 milliGRAM(s) Oral every 6 hours PRN  oxyCODONE    IR 15 milliGRAM(s) Oral two times a day PRN  oxyCODONE    IR 10 milliGRAM(s) Oral two times a day PRN  pantoprazole    Tablet 40 milliGRAM(s) Oral before breakfast  petrolatum white Ointment 1 Application(s) Topical every 8 hours  polyethylene glycol 3350 17 Gram(s) Oral two times a day  povidone iodine 10% Solution 1 Application(s) Topical daily  QUEtiapine 25 milliGRAM(s) Oral at bedtime  sevelamer carbonate 800 milliGRAM(s) Oral three times a day with meals  silver nitrate Applicator 6 Application(s) Topical once  tamsulosin 0.8 milliGRAM(s) Oral at bedtime  zinc sulfate 220 milliGRAM(s) Oral daily      T(C): 36.9 (05-31-25 @ 08:20), Max: 36.9 (05-31-25 @ 08:20)  HR: 93 (05-31-25 @ 08:20) (85 - 118)  BP: 111/74 (05-31-25 @ 08:20) (105/67 - 111/74)  RR: 16 (05-31-25 @ 08:20) (16 - 16)  SpO2: 92% (05-31-25 @ 08:20) (92% - 98%)    In NAD  HEENT- EOMI  Heart- RRR, S1S2  Lungs- CTA bl.  Abd- + BS, NT  Ext- Mild calf TTP, no warmth or erythema  Neuro- Exam unchanged      A+P:  39 year old male with PMH of morbid obesity, BEA on CPAP, pAFIB (not on AC prior to admission), HTN, and BL papilledema attributed to pseudotumor cerebri; who initially presented to  on 2/24 with SOB and BL LE edema. Found to have URI with progressive SOB and multifocal PNA on imaging. His hospital course was complicated by worsening respiratory distress and increased 02 demands secondary to secretions (requiring multiple intubation attempts) and eventual MICU admission. While in MICU, he 02 requirements continued despite optimal vent management. Concern noted for progressive ARDS, unable to prone given morbid obesity, and ultimately cannulated for vvECMO on 2/25 and transferred to [facility] for further management on 2/26.  His hospital course at [facility] was significant for the following: diuretic and ABX management, s/p trache and PEG (placed on 3/7- PEG since removed), RCU admission, high grade fevers (secondary to panniculitis), progressively worsening sacral ulcer (likely osteomyelitis- s/p surgical debridement), BL foot wound (secondary to pressor use), neuropathy (secondary to critical illness polyneuropathy), ELLA (secondary to vancomycin toxicity and overdiuresis- since DCd- with improvement). Patient now admitted for a multidisciplinary rehab program. 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  (present on admission)   - Gait Instability, ADL impairments and Functional impairments: start Comprehensive Rehab Program of PT/OT  - 3 hours a day, 5 days a week   - PRN: Nebulizer QID     #BEA on BIPAP  --via nasal canula qhs    #Sacral Osteomyelitis  - Zosyn TID - completed on 5/12/25    #Paroxysmal AFIB  - Eliquis 5mg BID     #Calf Tenderness  - BL LE doppler negative 5/21   -Mild calf tenderness persistent, recommended repeat DVT study 5/31 however patient declined, understands the risks, will advise if new symptoms    #L hip pain - resolved   - no acute findings on XR or CT  - L hip MRI (5/7)    #HTN  -Metoprolol    #Sleep/Mood  - Melatonin 9mg at HS   - Quetiapine 25mg at HS     #Skin  Wound Recs per Plastic Surgery (5/16):  Sacral Pressure Injury: BID- Continue monitor/wound care  - R buttock: BID- Continue monitor/wound care  - Continue to offload pressure  - Pressure injury/Skin: OOB to Chair, PT/OT    - Ammonium lactate    #Neuropathy  - Gabapentin 1200mg TID     #Pain Mgmt   - Capsaicin cream  - Lidocaine patch to BL thighs   -Oxycodone PRN  -celebrex   - neuropathic pain- gabapentin    #Morbid obesity    #GI/Bowel Mgmt   - Pantoprazole  - Bisacodyl  - Miralax   - Naloxegal    #/Bladder Mgmt   #ELLA  - USKB 5/20 negative   - Renvela 800mg TID      #Precautions / PROPHYLAXIS:   - Falls  - ortho: Weight bearing status: WBAT in surgical shoe   - Lungs: Aspiration, Incentive Spirometer   - DVT: apixaban

## 2025-05-31 NOTE — PROGRESS NOTE ADULT - SUBJECTIVE AND OBJECTIVE BOX
Patient is a 39y old  Male who presents with a chief complaint of Debility secondary to acute hypoxic respiratory failure (30 May 2025 09:40)      Patient seen and examined at bedside, stable, NAD. denies acute medical complaints.     ALLERGIES:  No Known Allergies    MEDICATIONS  (STANDING):  ammonium lactate 12% Lotion 1 Application(s) Topical two times a day  apixaban 5 milliGRAM(s) Oral two times a day  ascorbic acid 500 milliGRAM(s) Oral daily  bisacodyl 10 milliGRAM(s) Oral at bedtime  capsaicin 0.025% Cream 1 Application(s) Topical four times a day  celecoxib 100 milliGRAM(s) Oral two times a day  clotrimazole 1% Cream 1 Application(s) Topical <User Schedule>  collagenase Ointment 1 Application(s) Topical two times a day  Dakins Solution - 1/4 Strength 1 Application(s) Topical two times a day  dextrose 5%. 1000 milliLiter(s) (50 mL/Hr) IV Continuous <Continuous>  dextrose 5%. 1000 milliLiter(s) (100 mL/Hr) IV Continuous <Continuous>  dextrose 50% Injectable 25 Gram(s) IV Push once  dextrose 50% Injectable 12.5 Gram(s) IV Push once  dextrose 50% Injectable 25 Gram(s) IV Push once  gabapentin 1200 milliGRAM(s) Oral every 8 hours  glucagon  Injectable 1 milliGRAM(s) IntraMuscular once  influenza   Vaccine 0.5 milliLiter(s) IntraMuscular once  lidocaine   4% Patch 1 Patch Transdermal daily  lidocaine   4% Patch 1 Patch Transdermal daily  magnesium oxide 400 milliGRAM(s) Oral three times a day with meals  melatonin 9 milliGRAM(s) Oral at bedtime  metoprolol tartrate 50 milliGRAM(s) Oral two times a day  multivitamin 1 Tablet(s) Oral daily  mupirocin 2% Ointment 1 Application(s) Topical two times a day  naloxegol 25 milliGRAM(s) Oral daily  pantoprazole    Tablet 40 milliGRAM(s) Oral before breakfast  petrolatum white Ointment 1 Application(s) Topical every 8 hours  polyethylene glycol 3350 17 Gram(s) Oral two times a day  povidone iodine 10% Solution 1 Application(s) Topical daily  QUEtiapine 25 milliGRAM(s) Oral at bedtime  sevelamer carbonate 800 milliGRAM(s) Oral three times a day with meals  silver nitrate Applicator 6 Application(s) Topical once  tamsulosin 0.8 milliGRAM(s) Oral at bedtime  zinc sulfate 220 milliGRAM(s) Oral daily    MEDICATIONS  (PRN):  albuterol/ipratropium for Nebulization 3 milliLiter(s) Nebulizer every 6 hours PRN Shortness of Breath and/or Wheezing  dextrose Oral Gel 15 Gram(s) Oral once PRN Blood Glucose LESS THAN 70 milliGRAM(s)/deciliter  oxyCODONE    IR 20 milliGRAM(s) Oral every 6 hours PRN Severe Pain (7 - 10)  oxyCODONE    IR 15 milliGRAM(s) Oral two times a day PRN Moderate Pain (4 - 6)  oxyCODONE    IR 10 milliGRAM(s) Oral two times a day PRN Mild Pain (1 - 3)    Vital Signs Last 24 Hrs  T(F): 98.4 (31 May 2025 08:20), Max: 98.4 (31 May 2025 08:20)  HR: 93 (31 May 2025 08:20) (85 - 118)  BP: 111/74 (31 May 2025 08:20) (105/67 - 111/74)  RR: 16 (31 May 2025 08:20) (16 - 16)  SpO2: 92% (31 May 2025 08:20) (92% - 98%)  I&O's Summary      PHYSICAL EXAM:  General: NAD, A/O x 3, morbidly obese  ENT: MMM, no scleral icterus  Neck: Supple, No JVD  Lungs: Clear to auscultation bilaterally, no wheezes, rales, rhonchi  Cardio: RRR, S1/S2  Abdomen: Soft, Nontender, Nondistended; Bowel sounds present  Extremities: No calf tenderness, No pitting edema  Skin: dry, chronic LE changes with hyperpigmentation, callus on feet b/l      LABS:                        9.1    7.53  )-----------( 495      ( 29 May 2025 06:54 )             31.0       05-29    143  |  104  |  8   ----------------------------<  90  3.5   |  32  |  0.85    Ca    9.3      29 May 2025 06:54    TPro  7.0  /  Alb  1.9  /  TBili  0.4  /  DBili  x   /  AST  16  /  ALT  9   /  AlkPhos  46  05-29 03-08 Chol -- LDL -- HDL -- Trig 169 mg/dL                  Urinalysis Basic - ( 29 May 2025 06:54 )    Color: x / Appearance: x / SG: x / pH: x  Gluc: 90 mg/dL / Ketone: x  / Bili: x / Urobili: x   Blood: x / Protein: x / Nitrite: x   Leuk Esterase: x / RBC: x / WBC x   Sq Epi: x / Non Sq Epi: x / Bacteria: x            RADIOLOGY & ADDITIONAL TESTS:    Care Discussed with Consultants/Other Providers: yes, rehab

## 2025-06-01 PROCEDURE — 99232 SBSQ HOSP IP/OBS MODERATE 35: CPT

## 2025-06-01 RX ADMIN — GABAPENTIN 1200 MILLIGRAM(S): 400 CAPSULE ORAL at 14:18

## 2025-06-01 RX ADMIN — GABAPENTIN 1200 MILLIGRAM(S): 400 CAPSULE ORAL at 06:19

## 2025-06-01 RX ADMIN — Medication 1 APPLICATION(S): at 17:17

## 2025-06-01 RX ADMIN — Medication 1 APPLICATION(S): at 06:14

## 2025-06-01 RX ADMIN — SEVELAMER HYDROCHLORIDE 800 MILLIGRAM(S): 800 TABLET ORAL at 17:20

## 2025-06-01 RX ADMIN — GABAPENTIN 1200 MILLIGRAM(S): 400 CAPSULE ORAL at 22:47

## 2025-06-01 RX ADMIN — SODIUM HYPOCHLORITE 1 APPLICATION(S): 0.12 SOLUTION TOPICAL at 17:18

## 2025-06-01 RX ADMIN — OXYCODONE HYDROCHLORIDE 20 MILLIGRAM(S): 30 TABLET ORAL at 08:53

## 2025-06-01 RX ADMIN — MUPIROCIN CALCIUM 1 APPLICATION(S): 20 CREAM TOPICAL at 17:18

## 2025-06-01 RX ADMIN — OXYCODONE HYDROCHLORIDE 15 MILLIGRAM(S): 30 TABLET ORAL at 12:40

## 2025-06-01 RX ADMIN — Medication 1 APPLICATION(S): at 17:13

## 2025-06-01 RX ADMIN — Medication 1 TABLET(S): at 12:36

## 2025-06-01 RX ADMIN — Medication 40 MILLIGRAM(S): at 06:20

## 2025-06-01 RX ADMIN — APIXABAN 5 MILLIGRAM(S): 2.5 TABLET, FILM COATED ORAL at 06:20

## 2025-06-01 RX ADMIN — OXYCODONE HYDROCHLORIDE 20 MILLIGRAM(S): 30 TABLET ORAL at 22:39

## 2025-06-01 RX ADMIN — METOPROLOL SUCCINATE 50 MILLIGRAM(S): 50 TABLET, EXTENDED RELEASE ORAL at 06:19

## 2025-06-01 RX ADMIN — Medication 400 MILLIGRAM(S): at 17:19

## 2025-06-01 RX ADMIN — CELECOXIB 100 MILLIGRAM(S): 50 CAPSULE ORAL at 06:20

## 2025-06-01 RX ADMIN — SODIUM HYPOCHLORITE 1 APPLICATION(S): 0.12 SOLUTION TOPICAL at 06:14

## 2025-06-01 RX ADMIN — Medication 400 MILLIGRAM(S): at 12:37

## 2025-06-01 RX ADMIN — Medication 400 MILLIGRAM(S): at 08:53

## 2025-06-01 RX ADMIN — WHITE PETROLATUM 1 APPLICATION(S): 1 OINTMENT TOPICAL at 22:08

## 2025-06-01 RX ADMIN — Medication 9 MILLIGRAM(S): at 22:09

## 2025-06-01 RX ADMIN — OXYCODONE HYDROCHLORIDE 20 MILLIGRAM(S): 30 TABLET ORAL at 22:09

## 2025-06-01 RX ADMIN — Medication 500 MILLIGRAM(S): at 12:35

## 2025-06-01 RX ADMIN — APIXABAN 5 MILLIGRAM(S): 2.5 TABLET, FILM COATED ORAL at 17:20

## 2025-06-01 RX ADMIN — QUETIAPINE FUMARATE 25 MILLIGRAM(S): 25 TABLET ORAL at 22:09

## 2025-06-01 RX ADMIN — SEVELAMER HYDROCHLORIDE 800 MILLIGRAM(S): 800 TABLET ORAL at 12:35

## 2025-06-01 RX ADMIN — CELECOXIB 100 MILLIGRAM(S): 50 CAPSULE ORAL at 17:18

## 2025-06-01 RX ADMIN — METOPROLOL SUCCINATE 50 MILLIGRAM(S): 50 TABLET, EXTENDED RELEASE ORAL at 17:20

## 2025-06-01 RX ADMIN — NALOXEGOL OXALATE 25 MILLIGRAM(S): 12.5 TABLET, FILM COATED ORAL at 12:36

## 2025-06-01 RX ADMIN — Medication 220 MILLIGRAM(S): at 12:37

## 2025-06-01 RX ADMIN — SEVELAMER HYDROCHLORIDE 800 MILLIGRAM(S): 800 TABLET ORAL at 08:53

## 2025-06-01 RX ADMIN — WHITE PETROLATUM 1 APPLICATION(S): 1 OINTMENT TOPICAL at 06:14

## 2025-06-01 RX ADMIN — TAMSULOSIN HYDROCHLORIDE 0.8 MILLIGRAM(S): 0.4 CAPSULE ORAL at 22:09

## 2025-06-01 NOTE — PROGRESS NOTE ADULT - ASSESSMENT
38 y/o M with PMH of morbid obesity, BEA, pAFIB (not on AC), HTN, and BL papilledema attributed to pseudotumor cerebri; who initially presented to  on 2/24 with SOB and BL LE edema. Found to have URI with progressive SOB and multifocal PNA on imaging. His hospital course was complicated by worsening respiratory distress and increased O2 demands secondary to secretions (requiring multiple intubation attempts) and eventual MICU admission. While in MICU, his O2 requirements continued despite optimal vent management. Concern noted for progressive ARDS, unable to prone given morbid obesity, and ultimately cannulated for vvECMO on 2/25 and transferred to [facility] for further management on 2/26. His hospital course at [facility] was significant for the following: diuretic and ABX management, s/p trach and PEG (placed on 3/7- PEG since removed), RCU admission, high grade fevers (secondary to panniculitis), progressively worsening sacral ulcer (likely osteomyelitis- s/p surgical debridement), BL foot wound (secondary to pressor use), neuropathy (secondary to critical illness polyneuropathy), ELLA (secondary to vancomycin toxicity and overdiuresis- since DCd- with improvement). Patient now admitted for a multidisciplinary rehab program. 04-25-25     #Left hip pain - Improving  -Hip xray--> no fracture  -CT hip: Again seen is a sacral decubitus wound which extends superficial as well as involving the right gluteus musculature and the posterior L5 with small amount of fluid and gas within the tract. Limited involvement on this noncontrast CT. This likely corresponds to adjacent phlegmonous changes  -MRI left hip: Incompletely characterized sacral wound with associated ill-defined collection of fluid and soft tissue gas which extends to/involve the right gluteus zurdo muscle as at CT pelvis study from one day prior. No interval change in the high STIR signal within the bilateral gluteal and thigh musculature suggestive of a myositis as compared to prior MRI study. No fracture, osseous destruction or aggressive periosteal reaction. No osteomyelitis at the left hip. Edema-like signal along the right and left greater trochanteric regions, overall increased as compared to the prior MRI study from 4/11/2025. Findings are felt to be reactive in etiology.  -Continue comprehensive rehab program - PT/OT/SLP per rehab team  -Pain management, bowel regimen per rehab     #Abnormal CT A/P  -5/2: CT A/P: Question of pneumatosis in the cecum with no other evidence of bowel ischemia. Correlation with lactate levels and clinical exam would be helpful.  -Patient has no symptoms. Abdomen is benign. Tolerating PO  -5/2: Surgery cx noted: no intervention. c/w PO. refer to bariatric surgery post-DC dr botello at [facility] or Dr. Segovia at [facility] [per patient request]  -5/3 GI cx noted: no GI intervention planned. c/w PO    #Stage IV Sacral Decub Ulcer  #Bilateral Buttocks Wounds  #Bilateral Foot Wounds  -5/2 CT A/P: An air and fluid containing collection in the right gluteus zurdo muscle in continuity with a subcutaneous sacral collection. No obvious skin defect to suggest a decubitus ulcer.  -5/7: MRI left hip: Incompletely characterized sacral wound with associated ill-defined collection of fluid and soft tissue gas which extends to/involve the right gluteus zurdo muscle as at CT pelvis study from one day prior. No interval change in the high STIR signal within the bilateral gluteal and thigh musculature suggestive of a myositis as compared to prior MRI study. No fracture, osseous destruction or aggressive periosteal reaction. No osteomyelitis at the left hip. Edema-like signal along the right and left greater trochanteric regions, overall increased as compared to the prior MRI study from 4/11/2025. Findings are felt to be reactive in etiology..  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 RV is not well visualized, enlarged RV cavity size a/ reduced RV systolic function. No significant valvular disease.  No echocardiographic evidence of pulmonary hypertension  -s/p Trach (decannulated 3/28) and PEG (removed 4/15)   -Saturating well on RA  -Continue duoneb prn  -BIPAP qhs (FiO2 40%, 18/10) via nasal mask   -Fall precaution    #p-AFIB  #Sinus Tachycardia  -Patient has intermittent tachycardia. Per patient, His HR  mostly above 110 even when he is not sick. follows with cardio OP  -5/3: EKG: NSR@94 bpm  -Eliquis   -Off norvasc  -Continue metoprolol tartarate 50mg BID  -TSH WNL 2/25  -Sinus tachycardia likely multifactorial including pain and deconditioning. Low suspicion for PE, no SOB/hypoxia and he is already on full AC    #RIJ and Bilateral LE DVT  -Duplex RUE 3/14 showed Acute, non-occlusive deep vein thrombosis noted within the right internal jugular vein. Superficial vein thrombosis noted within the right antecubital cephalic vein  -Duplex LE 3/14 showed Acute, occlusive deep vein thromboses noted within the right and left peroneal veins at both mid calves. Age-indeterminate, occlusive deep vein thrombosis visualized within the left gastrocnemius vein at the proximal calf.  -Continue eliquis  -Repeat duplex 4/29 showed No evidence of deep venous thrombosis in either lower extremity.    #Normocytic Anemia   -Likely multifactorial  -H/H stable, no evidence of active bleed   -Monitor CBC     #HTN  -Amlodipine d/melly  -Metoprolol tartrate 50mg BID   -Monitor BP     #History of Pseudotumor Cerebri   -Outpatient follow up     #ELLA - resolved  -Baseline Cr appears to be ~0.8 range   -ELLA felt to be multifactorial in setting of cardiorenal w/ RVE, recurrent ELLA suspected due to vancomycin toxicity and diuresis   -Continue to encourage oral hydration   -Renvela   -Low phos diet  -Monitor BMP and phos level     #Type 2 Diabetes  -HbA1C 6.7 on 2/25, Repeat HbA1C 4.9 on 4/28  -FS trend reviewed, has been within goal  -Monitor glucose on routine lab, controlled     #Urinary Retention  -Flomax 5/1  -Renal u/s 5/20 unremarkable    #Mood  -Continue Seroquel    #DVT ppx - Eliquis  #GI ppx - PPI

## 2025-06-01 NOTE — PROGRESS NOTE ADULT - SUBJECTIVE AND OBJECTIVE BOX
Patient is a 39y old  Male who presents with a chief complaint of Debility secondary to acute hypoxic respiratory failure (31 May 2025 15:37)      Patient seen and examined at bedside. NAD. denies headache, fever, chills, cp, sob, n/v, abd pain.      ALLERGIES:  No Known Allergies    MEDICATIONS  (STANDING):  ammonium lactate 12% Lotion 1 Application(s) Topical two times a day  apixaban 5 milliGRAM(s) Oral two times a day  ascorbic acid 500 milliGRAM(s) Oral daily  bisacodyl 10 milliGRAM(s) Oral at bedtime  capsaicin 0.025% Cream 1 Application(s) Topical four times a day  celecoxib 100 milliGRAM(s) Oral two times a day  clotrimazole 1% Cream 1 Application(s) Topical <User Schedule>  collagenase Ointment 1 Application(s) Topical two times a day  Dakins Solution - 1/4 Strength 1 Application(s) Topical two times a day  dextrose 5%. 1000 milliLiter(s) (50 mL/Hr) IV Continuous <Continuous>  dextrose 5%. 1000 milliLiter(s) (100 mL/Hr) IV Continuous <Continuous>  dextrose 50% Injectable 25 Gram(s) IV Push once  dextrose 50% Injectable 12.5 Gram(s) IV Push once  dextrose 50% Injectable 25 Gram(s) IV Push once  gabapentin 1200 milliGRAM(s) Oral every 8 hours  glucagon  Injectable 1 milliGRAM(s) IntraMuscular once  influenza   Vaccine 0.5 milliLiter(s) IntraMuscular once  lidocaine   4% Patch 1 Patch Transdermal daily  lidocaine   4% Patch 1 Patch Transdermal daily  magnesium oxide 400 milliGRAM(s) Oral three times a day with meals  melatonin 9 milliGRAM(s) Oral at bedtime  metoprolol tartrate 50 milliGRAM(s) Oral two times a day  multivitamin 1 Tablet(s) Oral daily  mupirocin 2% Ointment 1 Application(s) Topical two times a day  naloxegol 25 milliGRAM(s) Oral daily  pantoprazole    Tablet 40 milliGRAM(s) Oral before breakfast  petrolatum white Ointment 1 Application(s) Topical every 8 hours  polyethylene glycol 3350 17 Gram(s) Oral two times a day  povidone iodine 10% Solution 1 Application(s) Topical daily  QUEtiapine 25 milliGRAM(s) Oral at bedtime  sevelamer carbonate 800 milliGRAM(s) Oral three times a day with meals  silver nitrate Applicator 6 Application(s) Topical once  tamsulosin 0.8 milliGRAM(s) Oral at bedtime  zinc sulfate 220 milliGRAM(s) Oral daily    MEDICATIONS  (PRN):  albuterol/ipratropium for Nebulization 3 milliLiter(s) Nebulizer every 6 hours PRN Shortness of Breath and/or Wheezing  dextrose Oral Gel 15 Gram(s) Oral once PRN Blood Glucose LESS THAN 70 milliGRAM(s)/deciliter  oxyCODONE    IR 20 milliGRAM(s) Oral every 6 hours PRN Severe Pain (7 - 10)  oxyCODONE    IR 15 milliGRAM(s) Oral two times a day PRN Moderate Pain (4 - 6)  oxyCODONE    IR 10 milliGRAM(s) Oral two times a day PRN Mild Pain (1 - 3)    Vital Signs Last 24 Hrs  T(F): 98.5 (01 Jun 2025 08:55), Max: 98.7 (31 May 2025 21:20)  HR: 78 (01 Jun 2025 08:55) (78 - 93)  BP: 121/76 (01 Jun 2025 08:55) (106/69 - 121/76)  RR: 16 (01 Jun 2025 08:55) (16 - 16)  SpO2: 92% (01 Jun 2025 08:55) (92% - 95%)  I&O's Summary      PHYSICAL EXAM:  General: NAD, A/O x 3, morbidly obese  ENT: MMM, no scleral icterus  Neck: Supple, No JVD  Lungs: Clear to auscultation bilaterally, no wheezes, rales, rhonchi  Cardio: RRR, S1/S2  Abdomen: Soft, Nontender, Nondistended; Bowel sounds present  Extremities: No calf tenderness, No pitting edema  Skin: dry, chronic LE changes with hyperpigmentation, callus on feet b/l    LABS:                        03-08 Chol -- LDL -- HDL -- Trig 169 mg/dL                          RADIOLOGY & ADDITIONAL TESTS:    Care Discussed with Consultants/Other Providers: yes, rehab

## 2025-06-01 NOTE — PROGRESS NOTE ADULT - SUBJECTIVE AND OBJECTIVE BOX
Cc: Gait dysfunction    HPI: Patient with no new medical issues today.  Pain controlled, no chest pain, no N/V, no Fevers/Chills. No other new ROS  Has been tolerating rehabilitation program.    albuterol/ipratropium for Nebulization 3 milliLiter(s) Nebulizer every 6 hours PRN  ammonium lactate 12% Lotion 1 Application(s) Topical two times a day  apixaban 5 milliGRAM(s) Oral two times a day  ascorbic acid 500 milliGRAM(s) Oral daily  bisacodyl 10 milliGRAM(s) Oral at bedtime  capsaicin 0.025% Cream 1 Application(s) Topical four times a day  celecoxib 100 milliGRAM(s) Oral two times a day  clotrimazole 1% Cream 1 Application(s) Topical <User Schedule>  collagenase Ointment 1 Application(s) Topical two times a day  Dakins Solution - 1/4 Strength 1 Application(s) Topical two times a day  dextrose 5%. 1000 milliLiter(s) IV Continuous <Continuous>  dextrose 5%. 1000 milliLiter(s) IV Continuous <Continuous>  dextrose 50% Injectable 25 Gram(s) IV Push once  dextrose 50% Injectable 12.5 Gram(s) IV Push once  dextrose 50% Injectable 25 Gram(s) IV Push once  dextrose Oral Gel 15 Gram(s) Oral once PRN  gabapentin 1200 milliGRAM(s) Oral every 8 hours  glucagon  Injectable 1 milliGRAM(s) IntraMuscular once  influenza   Vaccine 0.5 milliLiter(s) IntraMuscular once  lidocaine   4% Patch 1 Patch Transdermal daily  lidocaine   4% Patch 1 Patch Transdermal daily  magnesium oxide 400 milliGRAM(s) Oral three times a day with meals  melatonin 9 milliGRAM(s) Oral at bedtime  metoprolol tartrate 50 milliGRAM(s) Oral two times a day  multivitamin 1 Tablet(s) Oral daily  mupirocin 2% Ointment 1 Application(s) Topical two times a day  naloxegol 25 milliGRAM(s) Oral daily  oxyCODONE    IR 20 milliGRAM(s) Oral every 6 hours PRN  oxyCODONE    IR 15 milliGRAM(s) Oral two times a day PRN  oxyCODONE    IR 10 milliGRAM(s) Oral two times a day PRN  pantoprazole    Tablet 40 milliGRAM(s) Oral before breakfast  petrolatum white Ointment 1 Application(s) Topical every 8 hours  polyethylene glycol 3350 17 Gram(s) Oral two times a day  povidone iodine 10% Solution 1 Application(s) Topical daily  QUEtiapine 25 milliGRAM(s) Oral at bedtime  sevelamer carbonate 800 milliGRAM(s) Oral three times a day with meals  silver nitrate Applicator 6 Application(s) Topical once  tamsulosin 0.8 milliGRAM(s) Oral at bedtime  zinc sulfate 220 milliGRAM(s) Oral daily      T(C): 36.9 (06-01-25 @ 08:55), Max: 37.1 (05-31-25 @ 21:20)  HR: 78 (06-01-25 @ 08:55) (78 - 93)  BP: 121/76 (06-01-25 @ 08:55) (106/69 - 121/76)  RR: 16 (06-01-25 @ 08:55) (16 - 16)  SpO2: 92% (06-01-25 @ 08:55) (92% - 95%)      In NAD  HEENT- EOMI  Heart- RRR, S1S2  Lungs- CTA bl.  Abd- + BS, NT  Ext- Mild calf TTP, no warmth or erythema  Neuro- Exam unchanged      A+P:  39 year old male with PMH of morbid obesity, BEA on CPAP, pAFIB (not on AC prior to admission), HTN, and BL papilledema attributed to pseudotumor cerebri; who initially presented to  on 2/24 with SOB and BL LE edema. Found to have URI with progressive SOB and multifocal PNA on imaging. His hospital course was complicated by worsening respiratory distress and increased 02 demands secondary to secretions (requiring multiple intubation attempts) and eventual MICU admission. While in MICU, he 02 requirements continued despite optimal vent management. Concern noted for progressive ARDS, unable to prone given morbid obesity, and ultimately cannulated for vvECMO on 2/25 and transferred to [facility] for further management on 2/26.  His hospital course at [facility] was significant for the following: diuretic and ABX management, s/p trache and PEG (placed on 3/7- PEG since removed), RCU admission, high grade fevers (secondary to panniculitis), progressively worsening sacral ulcer (likely osteomyelitis- s/p surgical debridement), BL foot wound (secondary to pressor use), neuropathy (secondary to critical illness polyneuropathy), ELLA (secondary to vancomycin toxicity and overdiuresis- since DCd- with improvement). Patient now admitted for a multidisciplinary rehab program. 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  (present on admission)   - Gait Instability, ADL impairments and Functional impairments: start Comprehensive Rehab Program of PT/OT  - 3 hours a day, 5 days a week   - PRN: Nebulizer QID     #BEA on BIPAP  --via nasal canula qhs    #Sacral Osteomyelitis  - Zosyn TID - completed on 5/12/25    #Paroxysmal AFIB  - Eliquis 5mg BID     #Calf Tenderness  - BL LE doppler negative 5/21   -Mild calf tenderness persistent, discussed repeat DVT study, patient declined    #L hip pain - resolved   - no acute findings on XR or CT  - L hip MRI (5/7)    #HTN  -Metoprolol    #Sleep/Mood  - Melatonin 9mg at HS   - Quetiapine 25mg at HS     #Skin  Wound Recs per Plastic Surgery (5/16):  Sacral Pressure Injury: BID- Continue monitor/wound care  - R buttock: BID- Continue monitor/wound care  - Continue to offload pressure  - Pressure injury/Skin: OOB to Chair, PT/OT    - Ammonium lactate    #Neuropathy  - Gabapentin 1200mg TID     #Pain Mgmt   - Capsaicin cream  - Lidocaine patch to BL thighs   -Oxycodone PRN  -celebrex   - neuropathic pain- gabapentin    #Morbid obesity    #GI/Bowel Mgmt   - Pantoprazole  - Bisacodyl  - Miralax   - Naloxegal    #/Bladder Mgmt   #ELLA  - USKB 5/20 negative   - Renvela 800mg TID      #Precautions / PROPHYLAXIS:   - Falls  - ortho: Weight bearing status: WBAT in surgical shoe   - Lungs: Aspiration, Incentive Spirometer   - DVT: apixaban

## 2025-06-02 LAB
ALBUMIN SERPL ELPH-MCNC: 2 G/DL — LOW (ref 3.3–5)
ALP SERPL-CCNC: 47 U/L — SIGNIFICANT CHANGE UP (ref 40–120)
ALT FLD-CCNC: 16 U/L — SIGNIFICANT CHANGE UP (ref 10–45)
ANION GAP SERPL CALC-SCNC: 7 MMOL/L — SIGNIFICANT CHANGE UP (ref 5–17)
AST SERPL-CCNC: 21 U/L — SIGNIFICANT CHANGE UP (ref 10–40)
BASOPHILS # BLD AUTO: 0.02 K/UL — SIGNIFICANT CHANGE UP (ref 0–0.2)
BASOPHILS NFR BLD AUTO: 0.3 % — SIGNIFICANT CHANGE UP (ref 0–2)
BILIRUB SERPL-MCNC: 0.3 MG/DL — SIGNIFICANT CHANGE UP (ref 0.2–1.2)
BUN SERPL-MCNC: 11 MG/DL — SIGNIFICANT CHANGE UP (ref 7–23)
CALCIUM SERPL-MCNC: 9.2 MG/DL — SIGNIFICANT CHANGE UP (ref 8.4–10.5)
CHLORIDE SERPL-SCNC: 106 MMOL/L — SIGNIFICANT CHANGE UP (ref 96–108)
CO2 SERPL-SCNC: 31 MMOL/L — SIGNIFICANT CHANGE UP (ref 22–31)
CREAT SERPL-MCNC: 1.14 MG/DL — SIGNIFICANT CHANGE UP (ref 0.5–1.3)
EGFR: 85 ML/MIN/1.73M2 — SIGNIFICANT CHANGE UP
EGFR: 85 ML/MIN/1.73M2 — SIGNIFICANT CHANGE UP
EOSINOPHIL # BLD AUTO: 0.25 K/UL — SIGNIFICANT CHANGE UP (ref 0–0.5)
EOSINOPHIL NFR BLD AUTO: 3.6 % — SIGNIFICANT CHANGE UP (ref 0–6)
GLUCOSE SERPL-MCNC: 101 MG/DL — HIGH (ref 70–99)
HCT VFR BLD CALC: 31.2 % — LOW (ref 39–50)
HGB BLD-MCNC: 9.1 G/DL — LOW (ref 13–17)
IMM GRANULOCYTES NFR BLD AUTO: 0.3 % — SIGNIFICANT CHANGE UP (ref 0–0.9)
LYMPHOCYTES # BLD AUTO: 2.22 K/UL — SIGNIFICANT CHANGE UP (ref 1–3.3)
LYMPHOCYTES # BLD AUTO: 31.6 % — SIGNIFICANT CHANGE UP (ref 13–44)
MCHC RBC-ENTMCNC: 24.5 PG — LOW (ref 27–34)
MCHC RBC-ENTMCNC: 29.2 G/DL — LOW (ref 32–36)
MCV RBC AUTO: 84.1 FL — SIGNIFICANT CHANGE UP (ref 80–100)
MONOCYTES # BLD AUTO: 0.91 K/UL — HIGH (ref 0–0.9)
MONOCYTES NFR BLD AUTO: 13 % — SIGNIFICANT CHANGE UP (ref 2–14)
NEUTROPHILS # BLD AUTO: 3.6 K/UL — SIGNIFICANT CHANGE UP (ref 1.8–7.4)
NEUTROPHILS NFR BLD AUTO: 51.2 % — SIGNIFICANT CHANGE UP (ref 43–77)
NRBC BLD AUTO-RTO: 0 /100 WBCS — SIGNIFICANT CHANGE UP (ref 0–0)
PLATELET # BLD AUTO: 503 K/UL — HIGH (ref 150–400)
POTASSIUM SERPL-MCNC: 3.5 MMOL/L — SIGNIFICANT CHANGE UP (ref 3.5–5.3)
POTASSIUM SERPL-SCNC: 3.5 MMOL/L — SIGNIFICANT CHANGE UP (ref 3.5–5.3)
PROT SERPL-MCNC: 7.2 G/DL — SIGNIFICANT CHANGE UP (ref 6–8.3)
RBC # BLD: 3.71 M/UL — LOW (ref 4.2–5.8)
RBC # FLD: 18.5 % — HIGH (ref 10.3–14.5)
SODIUM SERPL-SCNC: 144 MMOL/L — SIGNIFICANT CHANGE UP (ref 135–145)
WBC # BLD: 7.02 K/UL — SIGNIFICANT CHANGE UP (ref 3.8–10.5)
WBC # FLD AUTO: 7.02 K/UL — SIGNIFICANT CHANGE UP (ref 3.8–10.5)

## 2025-06-02 PROCEDURE — 99233 SBSQ HOSP IP/OBS HIGH 50: CPT

## 2025-06-02 PROCEDURE — 99232 SBSQ HOSP IP/OBS MODERATE 35: CPT

## 2025-06-02 RX ADMIN — WHITE PETROLATUM 1 APPLICATION(S): 1 OINTMENT TOPICAL at 06:24

## 2025-06-02 RX ADMIN — Medication 1 APPLICATION(S): at 17:44

## 2025-06-02 RX ADMIN — Medication 400 MILLIGRAM(S): at 07:45

## 2025-06-02 RX ADMIN — SEVELAMER HYDROCHLORIDE 800 MILLIGRAM(S): 800 TABLET ORAL at 07:45

## 2025-06-02 RX ADMIN — Medication 1 APPLICATION(S): at 06:23

## 2025-06-02 RX ADMIN — OXYCODONE HYDROCHLORIDE 20 MILLIGRAM(S): 30 TABLET ORAL at 21:35

## 2025-06-02 RX ADMIN — SODIUM HYPOCHLORITE 1 APPLICATION(S): 0.12 SOLUTION TOPICAL at 17:44

## 2025-06-02 RX ADMIN — GABAPENTIN 1200 MILLIGRAM(S): 400 CAPSULE ORAL at 06:44

## 2025-06-02 RX ADMIN — WHITE PETROLATUM 1 APPLICATION(S): 1 OINTMENT TOPICAL at 14:09

## 2025-06-02 RX ADMIN — SEVELAMER HYDROCHLORIDE 800 MILLIGRAM(S): 800 TABLET ORAL at 12:42

## 2025-06-02 RX ADMIN — TAMSULOSIN HYDROCHLORIDE 0.8 MILLIGRAM(S): 0.4 CAPSULE ORAL at 21:35

## 2025-06-02 RX ADMIN — Medication 1 APPLICATION(S): at 11:24

## 2025-06-02 RX ADMIN — MUPIROCIN CALCIUM 1 APPLICATION(S): 20 CREAM TOPICAL at 17:47

## 2025-06-02 RX ADMIN — CELECOXIB 100 MILLIGRAM(S): 50 CAPSULE ORAL at 17:41

## 2025-06-02 RX ADMIN — OXYCODONE HYDROCHLORIDE 20 MILLIGRAM(S): 30 TABLET ORAL at 22:35

## 2025-06-02 RX ADMIN — Medication 400 MILLIGRAM(S): at 12:43

## 2025-06-02 RX ADMIN — Medication 500 MILLIGRAM(S): at 11:12

## 2025-06-02 RX ADMIN — OXYCODONE HYDROCHLORIDE 15 MILLIGRAM(S): 30 TABLET ORAL at 17:46

## 2025-06-02 RX ADMIN — GABAPENTIN 1200 MILLIGRAM(S): 400 CAPSULE ORAL at 14:08

## 2025-06-02 RX ADMIN — APIXABAN 5 MILLIGRAM(S): 2.5 TABLET, FILM COATED ORAL at 06:44

## 2025-06-02 RX ADMIN — MUPIROCIN CALCIUM 1 APPLICATION(S): 20 CREAM TOPICAL at 06:24

## 2025-06-02 RX ADMIN — SODIUM HYPOCHLORITE 1 APPLICATION(S): 0.12 SOLUTION TOPICAL at 06:24

## 2025-06-02 RX ADMIN — Medication 40 MILLIGRAM(S): at 06:43

## 2025-06-02 RX ADMIN — NALOXEGOL OXALATE 25 MILLIGRAM(S): 12.5 TABLET, FILM COATED ORAL at 11:11

## 2025-06-02 RX ADMIN — SEVELAMER HYDROCHLORIDE 800 MILLIGRAM(S): 800 TABLET ORAL at 17:42

## 2025-06-02 RX ADMIN — QUETIAPINE FUMARATE 25 MILLIGRAM(S): 25 TABLET ORAL at 21:35

## 2025-06-02 RX ADMIN — METOPROLOL SUCCINATE 50 MILLIGRAM(S): 50 TABLET, EXTENDED RELEASE ORAL at 17:43

## 2025-06-02 RX ADMIN — APIXABAN 5 MILLIGRAM(S): 2.5 TABLET, FILM COATED ORAL at 17:42

## 2025-06-02 RX ADMIN — Medication 1 APPLICATION(S): at 11:13

## 2025-06-02 RX ADMIN — Medication 400 MILLIGRAM(S): at 17:42

## 2025-06-02 RX ADMIN — Medication 1 TABLET(S): at 11:12

## 2025-06-02 RX ADMIN — Medication 1 APPLICATION(S): at 17:43

## 2025-06-02 RX ADMIN — Medication 220 MILLIGRAM(S): at 11:12

## 2025-06-02 RX ADMIN — GABAPENTIN 1200 MILLIGRAM(S): 400 CAPSULE ORAL at 21:35

## 2025-06-02 RX ADMIN — CELECOXIB 100 MILLIGRAM(S): 50 CAPSULE ORAL at 06:44

## 2025-06-02 RX ADMIN — Medication 9 MILLIGRAM(S): at 21:35

## 2025-06-02 RX ADMIN — METOPROLOL SUCCINATE 50 MILLIGRAM(S): 50 TABLET, EXTENDED RELEASE ORAL at 06:43

## 2025-06-02 NOTE — PROGRESS NOTE ADULT - ASSESSMENT
39 year old male with PMH of morbid obesity, BEA on CPAP, pAFIB (not on AC prior to admission), HTN, and BL papilledema attributed to pseudotumor cerebri; who initially presented to  on 2/24 with SOB and BL LE edema. Found to have URI with progressive SOB and multifocal PNA on imaging. His hospital course was complicated by worsening respiratory distress and increased 02 demands secondary to secretions (requiring multiple intubation attempts) and eventual MICU admission. While in MICU, he 02 requirements continued despite optimal vent management. Concern noted for progressive ARDS, unable to prone given morbid obesity, and ultimately cannulated for vvECMO on 2/25 and transferred to [facility] for further management on 2/26.  His hospital course at [facility] was significant for the following: diuretic and ABX management, s/p trache and PEG (placed on 3/7- PEG since removed), RCU admission, high grade fevers (secondary to panniculitis), progressively worsening sacral ulcer (likely osteomyelitis- s/p surgical debridement), BL foot wound (secondary to pressor use), neuropathy (secondary to critical illness polyneuropathy), ELLA (secondary to vancomycin toxicity and overdiuresis- since DCd- with improvement). Patient now admitted for a multidisciplinary rehab program. 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    - Gait Instability, ADL impairments and Functional impairments: start Comprehensive Rehab Program of PT/OT  - 3 hours a day, 5 days a week   - PRN: Nebulizer QID     #BEA on BIPAP  --via nasal canula qhs    #Sacral Osteomyelitis  - Zosyn TID - completed on 5/12/25    #Paroxysmal AFIB  - Eliquis 5mg BID     #Calf Tenderness/pain b/l feet  - BL LE doppler negative 5/21   --Apply offloading leg boots when OOB      #L hip pain - resolved  - no acute findings on XR or CT   - L hip MRI (5/7) no significant findings     #HTN  - Amlodipine 10mg dialy     #Sleep/Mood  - Melatonin 9mg at HS   - Quetiapine 25mg at HS     #Skin  Wound Recs per Plastic Surgery (5/16):  Sacral Pressure Injury: BID- Cleanse with NS, remove excess fluid, apply santyl ointment to sloughing tissues, pack wound with 1-inch dry plain packing, cover with 4 inch gauze and abd combine  - R buttock: BID- Cleanse with NS, overlay calcium alginate, ABD combine dressing  Additional wound care instructions:  -->Right anterior thigh to groin isolated wound: Clean wound and periwound skin with NS. Pat dry. Apply liquid barrier film to periwound skin, allow to dry. Cover with silicone foam with border. Change daily or PRN if soiled   --> Bilateral abdominal pannus, bilateral groin: Cleanse with luke-warm soap and water, dry well. Apply clotrimazole  --> Bilateral feet: Apply betadine  to bilateral foot wounds followed by 4x4 gauze, ABD pad, and matilde daily per podiatry.  --> Continue to offload pressure; bariatric low airloss support surface, turn and position per protocol with use of fluidized positioning devices, continue incontinence and moisture care per protocol and use of single breathable incontinence pads, continue to offload heels with fluidized positioning devices. Continue use of bariatric seat cushion, limit sit time- no more than 2 consecutive hours at any given time.  - Pressure injury/Skin: OOB to Chair, PT/OT    - Ammonium lactate to BL LEs     #Neuropathy/sacral ulcer  - Gabapentin 1200mg TID --aim to taper to lower dose    #Pain Mgmt   - Capsaicin cream to BL feet QID - Avoid use on damaged, broken, or irritated skin. Avoid occlusive dressing or heat   - Lidocaine patch to BL thighs   - PRN:; Oxycodone and Tylenol 650mg QID   -celebrex 100mg bid for sacral pain   - neuropathic pain b/l feet c/w   gabapentin    #Morbid obesity  - Most recent weight 394lbs (5/7)     #GI/Bowel Mgmt   - Pantoprazole  - Bisacodyl 10mg at HS   - Miralax   - Naloxegal 25mg daily     #/Bladder Mgmt   #ELLA  - USKB 5/20 negative   - Renvela 800mg TID    #FEN --Morbid obesity (BMI > 40).  - [facility] - Regular + Thins  [facility]    - MVI     # Health Management:   Environmental challenge affecting dc plan--narrow door and need for ramp to house entrance  However, patient making goal directed effort towards therapy goals     #Precautions / PROPHYLAXIS:   - Falls  - ortho: Weight bearing status: WBAT in surgical shoe   - Lungs: Aspiration, Incentive Spirometer   - DVT: Lovenox  ----------------------------------------------      Function as at 5/27  Stand pivot T/F during shower max x 2 persons  Pain b/l feet over areas with skin break on dorsum of foot, limiting wt bearing  Most transfers still  Sit to stand mod assist x 1 T/f bed to  Bussy board mod assist x 1  Took 4 steps on parallel bars with mo assist x 2  Making progress towards therapy goals  Barrier--ulcers on feet, limiting wt bearing, urinary hesitancy, environmental barrier at home (stairs), severe obesity  LE weakness  Overall making functional progress, has supportive family,  hence decision to extend DC date and achieve same home dc   Ext dc 6/6  (barriers as noted, making functional progress)  will discuss Home vs [facility] placement considering multiple issues--environmental barrier at home, large body habitus, difficulty with transfers    ----------------------------------------------  Dr. CANTU's Liaison with Family/Providers:  6/1- Met Patient's father during his visit to the unit during PM rounds, discssed review today, improved function, good pain control, appropriate use of offloading shoes  He is happy with this. Significant functional progress over last few days noted   The main challenge in DF strength, we will work on using ace wraps, and will get orthotics review for AFO if this persists    ----------------------------------------------  OUTPATIENT/FOLLOW UP:    Your Primary Care Provider,   Phone: (   )    -  Fax: (   )    -  Follow Up Time: 1 week    [facility] [place],   [address] [facility] [address] [facility]  Phone: [phone]  Fax: (   )    -  Follow Up Time:    Dr. Waterhouse  Podiatry  [phone]  Within 1 week     Spent 53 mins, patient review, discussion of lab results, functional progress, observation in therapy and update to family,

## 2025-06-02 NOTE — PROGRESS NOTE ADULT - SUBJECTIVE AND OBJECTIVE BOX
Patient is a 39y old  Male who presents with a chief complaint of Debility secondary to acute hypoxic respiratory failure (01 Jun 2025 12:14)      Patient seen and examined at bedside. NAD. pain controlled this am.     ALLERGIES:  No Known Allergies    MEDICATIONS  (STANDING):  ammonium lactate 12% Lotion 1 Application(s) Topical two times a day  apixaban 5 milliGRAM(s) Oral two times a day  ascorbic acid 500 milliGRAM(s) Oral daily  bisacodyl 10 milliGRAM(s) Oral at bedtime  capsaicin 0.025% Cream 1 Application(s) Topical four times a day  celecoxib 100 milliGRAM(s) Oral two times a day  clotrimazole 1% Cream 1 Application(s) Topical <User Schedule>  collagenase Ointment 1 Application(s) Topical two times a day  Dakins Solution - 1/4 Strength 1 Application(s) Topical two times a day  dextrose 5%. 1000 milliLiter(s) (50 mL/Hr) IV Continuous <Continuous>  dextrose 5%. 1000 milliLiter(s) (100 mL/Hr) IV Continuous <Continuous>  dextrose 50% Injectable 25 Gram(s) IV Push once  dextrose 50% Injectable 12.5 Gram(s) IV Push once  dextrose 50% Injectable 25 Gram(s) IV Push once  gabapentin 1200 milliGRAM(s) Oral every 8 hours  glucagon  Injectable 1 milliGRAM(s) IntraMuscular once  influenza   Vaccine 0.5 milliLiter(s) IntraMuscular once  lidocaine   4% Patch 1 Patch Transdermal daily  lidocaine   4% Patch 1 Patch Transdermal daily  magnesium oxide 400 milliGRAM(s) Oral three times a day with meals  melatonin 9 milliGRAM(s) Oral at bedtime  metoprolol tartrate 50 milliGRAM(s) Oral two times a day  multivitamin 1 Tablet(s) Oral daily  mupirocin 2% Ointment 1 Application(s) Topical two times a day  naloxegol 25 milliGRAM(s) Oral daily  pantoprazole    Tablet 40 milliGRAM(s) Oral before breakfast  petrolatum white Ointment 1 Application(s) Topical every 8 hours  polyethylene glycol 3350 17 Gram(s) Oral two times a day  povidone iodine 10% Solution 1 Application(s) Topical daily  QUEtiapine 25 milliGRAM(s) Oral at bedtime  sevelamer carbonate 800 milliGRAM(s) Oral three times a day with meals  silver nitrate Applicator 6 Application(s) Topical once  tamsulosin 0.8 milliGRAM(s) Oral at bedtime  zinc sulfate 220 milliGRAM(s) Oral daily    MEDICATIONS  (PRN):  albuterol/ipratropium for Nebulization 3 milliLiter(s) Nebulizer every 6 hours PRN Shortness of Breath and/or Wheezing  dextrose Oral Gel 15 Gram(s) Oral once PRN Blood Glucose LESS THAN 70 milliGRAM(s)/deciliter  oxyCODONE    IR 20 milliGRAM(s) Oral every 6 hours PRN Severe Pain (7 - 10)  oxyCODONE    IR 15 milliGRAM(s) Oral two times a day PRN Moderate Pain (4 - 6)  oxyCODONE    IR 10 milliGRAM(s) Oral two times a day PRN Mild Pain (1 - 3)    Vital Signs Last 24 Hrs  T(F): 98.1 (02 Jun 2025 07:48), Max: 98.6 (01 Jun 2025 22:03)  HR: 80 (02 Jun 2025 07:48) (80 - 91)  BP: 100/67 (02 Jun 2025 07:48) (100/67 - 122/76)  RR: 16 (02 Jun 2025 07:48) (16 - 16)  SpO2: 92% (02 Jun 2025 07:48) (92% - 96%)  I&O's Summary      PHYSICAL EXAM:  General: NAD, A/O x 3, morbidly obese  ENT: MMM, no scleral icterus  Neck: Supple, No JVD  Lungs: Clear to auscultation bilaterally, no wheezes, rales, rhonchi  Cardio: RRR, S1/S2  Abdomen: Soft, Nontender, Nondistended; Bowel sounds present  Extremities: No calf tenderness, No pitting edema  Skin: dry, chronic LE changes with hyperpigmentation, callus on feet b/l with dressing      LABS:                        9.1    7.02  )-----------( 503      ( 02 Jun 2025 07:10 )             31.2                         03-08 Chol -- LDL -- HDL -- Trig 169 mg/dL                          RADIOLOGY & ADDITIONAL TESTS:    Care Discussed with Consultants/Other Providers: yes, rehab

## 2025-06-02 NOTE — PROGRESS NOTE ADULT - ASSESSMENT
40 y/o M with PMH of morbid obesity, BEA, pAFIB (not on AC), HTN, and BL papilledema attributed to pseudotumor cerebri; who initially presented to  on 2/24 with SOB and BL LE edema. Found to have URI with progressive SOB and multifocal PNA on imaging. His hospital course was complicated by worsening respiratory distress and increased O2 demands secondary to secretions (requiring multiple intubation attempts) and eventual MICU admission. While in MICU, his O2 requirements continued despite optimal vent management. Concern noted for progressive ARDS, unable to prone given morbid obesity, and ultimately cannulated for vvECMO on 2/25 and transferred to [facility] for further management on 2/26. His hospital course at [facility] was significant for the following: diuretic and ABX management, s/p trach and PEG (placed on 3/7- PEG since removed), RCU admission, high grade fevers (secondary to panniculitis), progressively worsening sacral ulcer (likely osteomyelitis- s/p surgical debridement), BL foot wound (secondary to pressor use), neuropathy (secondary to critical illness polyneuropathy), ELLA (secondary to vancomycin toxicity and overdiuresis- since DCd- with improvement). Patient now admitted for a multidisciplinary rehab program. 04-25-25     #Left hip pain - Improving  -Hip xray--> no fracture  -CT hip: Again seen is a sacral decubitus wound which extends superficial as well as involving the right gluteus musculature and the posterior L5 with small amount of fluid and gas within the tract. Limited involvement on this noncontrast CT. This likely corresponds to adjacent phlegmonous changes  -MRI left hip: Incompletely characterized sacral wound with associated ill-defined collection of fluid and soft tissue gas which extends to/involve the right gluteus zurdo muscle as at CT pelvis study from one day prior. No interval change in the high STIR signal within the bilateral gluteal and thigh musculature suggestive of a myositis as compared to prior MRI study. No fracture, osseous destruction or aggressive periosteal reaction. No osteomyelitis at the left hip. Edema-like signal along the right and left greater trochanteric regions, overall increased as compared to the prior MRI study from 4/11/2025. Findings are felt to be reactive in etiology.  -Continue comprehensive rehab program - PT/OT/SLP per rehab team  -Pain management, bowel regimen per rehab     #Abnormal CT A/P  -5/2: CT A/P: Question of pneumatosis in the cecum with no other evidence of bowel ischemia. Correlation with lactate levels and clinical exam would be helpful.  -Patient has no symptoms. Abdomen is benign. Tolerating PO  -5/2: Surgery cx noted: no intervention. c/w PO. refer to bariatric surgery post-DC dr botello at [place] or Dr. Segovia at [place] [per patient request]  -5/3 GI cx noted: no GI intervention planned. c/w PO    #Stage IV Sacral Decub Ulcer  #Bilateral Buttocks Wounds  #Bilateral Foot Wounds  -5/2 CT A/P: An air and fluid containing collection in the right gluteus zurdo muscle in continuity with a subcutaneous sacral collection. No obvious skin defect to suggest a decubitus ulcer.  -5/7: MRI left hip: Incompletely characterized sacral wound with associated ill-defined collection of fluid and soft tissue gas which extends to/involve the right gluteus zurdo muscle as at CT pelvis study from one day prior. No interval change in the high STIR signal within the bilateral gluteal and thigh musculature suggestive of a myositis as compared to prior MRI study. No fracture, osseous destruction or aggressive periosteal reaction. No osteomyelitis at the left hip. Edema-like signal along the right and left greater trochanteric regions, overall increased as compared to the prior MRI study from 4/11/2025. Findings are felt to be reactive in etiology..  -Continue local wound care  -MVI, vitamin C and zinc   -Off load pressure  -Plastic consult following  -ID follow up noted and appreciated  -Per plastics - continue current management    #Fungemia 2/2 Panniculitis - resolved  -Blood culture 3/15, 3/16 with candida albicans, s/p course of antifungals   -Repeat cultures negative since 3/18    #Debility Secondary to Acute Hypoxic Respiratory Failure/ARDS 2/2 PNA  #BEA (Not on CPAP)  #Critical Illness Polyneuropathy   -s/p VV ECMO, s/p diuresis, TTE/ROBERT with RV failure, s/p treatment for pneumonia  -Repeat Echo 3/11 showed EF 66 %. RV is not well visualized, enlarged RV cavity size a/ reduced RV systolic function. No significant valvular disease.  No echocardiographic evidence of pulmonary hypertension  -s/p Trach (decannulated 3/28) and PEG (removed 4/15)   -Saturating well on RA. Continue duoneb prn  -BIPAP qhs (FiO2 40%, 18/10) via nasal mask   -Fall precaution    #p-AFIB  #Sinus Tachycardia  -Patient has intermittent tachycardia. Per patient, His HR  mostly above 110 even when he is not sick. follows with cardio OP  -5/3: EKG: NSR@94 bpm  -Eliquis   -Off norvasc  -Continue metoprolol tartarate 50mg BID  -TSH WNL 2/25  -Sinus tachycardia likely multifactorial including pain and deconditioning. Low suspicion for PE, no SOB/hypoxia and he is already on full AC    #RIJ and Bilateral LE DVT  -Duplex RUE 3/14 showed Acute, non-occlusive deep vein thrombosis noted within the right internal jugular vein. Superficial vein thrombosis noted within the right antecubital cephalic vein  -Duplex LE 3/14 showed Acute, occlusive deep vein thromboses noted within the right and left peroneal veins at both mid calves. Age-indeterminate, occlusive deep vein thrombosis visualized within the left gastrocnemius vein at the proximal calf.  -Continue eliquis  -Repeat duplex 4/29 showed No evidence of deep venous thrombosis in either lower extremity.    #Normocytic Anemia - Likely multifactorial  -H/H stable, no evidence of active bleed   -Monitor CBC    #HTN  -Amlodipine d/melly  -Metoprolol tartrate 50mg BID   -Monitor BP     #History of Pseudotumor Cerebri   -Outpatient follow up     #ELLA - resolved  -Baseline Cr appears to be ~0.8 range   -ELLA felt to be multifactorial in setting of cardiorenal w/ RVE, recurrent ELLA suspected due to vancomycin toxicity and diuresis   -Continue to encourage oral hydration   -Renvela   -Low phos diet  -Monitor BMP and phos level     #Type 2 Diabetes, HbA1C 4.9 (4/28)  -FS wnl. Monitor glucose on routine lab, controlled     #Urinary Retention  -Flomax 5/1  -Renal u/s 5/20 unremarkable    #Mood  -Continue Seroquel    #DVT ppx - Eliquis  #GI ppx - PPI

## 2025-06-02 NOTE — PROGRESS NOTE ADULT - SUBJECTIVE AND OBJECTIVE BOX
Subjective  Patient seen and examined this AM, subsequently observed in therapy   Reports good night rest, good engagement in therapy during the wk, took a few steps and had a shower  Reports good night rest, improved tolerance of therapy  Reports no pain sacrum and sacrum  Patient still having difficulty with DF during review    ROS--no acute pain, no fever or chest pain   B/l leg and sacral ulcers,  LBM 6/1    Function 5/29  -Right side lying to sitting EOB with HOB fully elevated needing MOD A for LE  management. Donned bilateral [employer] shoes provided by  this weekend.  -SPT with bariatric RW from Bed to wheelchair needing MOD A x 1 and stand by  assist of second person for safety.    Therapeutic Exercise  Seated UE strengthening following transfer OOB  -chest press with 7 lb. weighted bar  -shoulder flexion with 7 lb. weighted bar  -bicep curl with 7 lb. dumbbell    Observed during therapy, stood up in the shower during transfers, ,minimal pain over sole of foot  Tolerated therapy very well, No pain while sitting on his wc prolonged period    EXAM  Gen - Comfortable, cheerful  HEENT - NAD  Neck - Supple, No limited ROM  Pulm - Clear  Cardiovascular - RRR, S1S2, No murmurs  Abdomen - Soft, non tender, +BS  Extremities - chronic skin changes with hyperpigmentation at shin, callus on feet    Neuro-  AAO X 3, Speech is normal     Motor - UE 5/5 Hip flexion 3/5, Knee 4/5 PF 4/5, DF 2/5     Sensory - Decreased to light touch bilateral lower extremities      Coordination - impaired lower extremities   Psychiatric - Mood stable, Affect WNL    Skin:  R foot plantar aspect lateral/distal side 7cm x 5.5cm ischemic wound from pressors  RLE 4th digit 1x1.5cm ischemic induced wound from pressors  RLE 5th digit 3x2cm ischemic induced wound from pressors  L foot plantar aspect lateral/distal side 6x7cm ischemic induced wound from pressor usage  LLE 4th digit 2x1cm ischemic induced wound from pressors  LLE 5th digit 2.5x3cm ischemic induced wound from pressors   R groin skin tear 1x1.5cm  Unstageable pressure wound cocyx 7cmx4.5cm w/ 2.5cm depth  R buttock pressure induced wound semi contiguous with coccyx wound calling unstageable given prior debridement 8.5x5.5cm  L buttock 1.0x0.5cm stage 3 pressure injury  R buttock skin tear 3.5cm x 0.5cm and 1.5cmx0.5cm  Dry skin with callus on sole of foot     MMS--antigravity upper extremities,        RECENT LABS/IMAGING                        9.1    7.02  )-----------( 503      ( 02 Jun 2025 07:10 )             31.2     06-02    144  |  106  |  11  ----------------------------<  101[H]  3.5   |  31  |  1.14    Ca    9.2      02 Jun 2025 07:10    TPro  7.2  /  Alb  2.0[L]  /  TBili  0.3  /  DBili  x   /  AST  21  /  ALT  16  /  AlkPhos  47  06-02      Urinalysis Basic - ( 02 Jun 2025 07:10 )    Color: x / Appearance: x / SG: x / pH: x  Gluc: 101 mg/dL / Ketone: x  / Bili: x / Urobili: x   Blood: x / Protein: x / Nitrite: x   Leuk Esterase: x / RBC: x / WBC x   Sq Epi: x / Non Sq Epi: x / Bacteria: x    MEDICATIONS  (STANDING):  ammonium lactate 12% Lotion 1 Application(s) Topical two times a day  apixaban 5 milliGRAM(s) Oral two times a day  ascorbic acid 500 milliGRAM(s) Oral daily  bisacodyl 10 milliGRAM(s) Oral at bedtime  capsaicin 0.025% Cream 1 Application(s) Topical four times a day  celecoxib 100 milliGRAM(s) Oral two times a day  clotrimazole 1% Cream 1 Application(s) Topical <User Schedule>  collagenase Ointment 1 Application(s) Topical two times a day  Dakins Solution - 1/4 Strength 1 Application(s) Topical two times a day  dextrose 5%. 1000 milliLiter(s) (50 mL/Hr) IV Continuous <Continuous>  dextrose 5%. 1000 milliLiter(s) (100 mL/Hr) IV Continuous <Continuous>  dextrose 50% Injectable 25 Gram(s) IV Push once  dextrose 50% Injectable 12.5 Gram(s) IV Push once  dextrose 50% Injectable 25 Gram(s) IV Push once  gabapentin 1200 milliGRAM(s) Oral every 8 hours  glucagon  Injectable 1 milliGRAM(s) IntraMuscular once  influenza   Vaccine 0.5 milliLiter(s) IntraMuscular once  lidocaine   4% Patch 1 Patch Transdermal daily  lidocaine   4% Patch 1 Patch Transdermal daily  magnesium oxide 400 milliGRAM(s) Oral three times a day with meals  melatonin 9 milliGRAM(s) Oral at bedtime  metoprolol tartrate 50 milliGRAM(s) Oral two times a day  multivitamin 1 Tablet(s) Oral daily  mupirocin 2% Ointment 1 Application(s) Topical two times a day  naloxegol 25 milliGRAM(s) Oral daily  pantoprazole    Tablet 40 milliGRAM(s) Oral before breakfast  petrolatum white Ointment 1 Application(s) Topical every 8 hours  polyethylene glycol 3350 17 Gram(s) Oral two times a day  povidone iodine 10% Solution 1 Application(s) Topical daily  QUEtiapine 25 milliGRAM(s) Oral at bedtime  sevelamer carbonate 800 milliGRAM(s) Oral three times a day with meals  silver nitrate Applicator 6 Application(s) Topical once  tamsulosin 0.8 milliGRAM(s) Oral at bedtime  zinc sulfate 220 milliGRAM(s) Oral daily    MEDICATIONS  (PRN):  albuterol/ipratropium for Nebulization 3 milliLiter(s) Nebulizer every 6 hours PRN Shortness of Breath and/or Wheezing  dextrose Oral Gel 15 Gram(s) Oral once PRN Blood Glucose LESS THAN 70 milliGRAM(s)/deciliter  oxyCODONE    IR 20 milliGRAM(s) Oral every 6 hours PRN Severe Pain (7 - 10)  oxyCODONE    IR 15 milliGRAM(s) Oral two times a day PRN Moderate Pain (4 - 6)  oxyCODONE    IR 10 milliGRAM(s) Oral two times a day PRN Mild Pain (1 - 3)

## 2025-06-03 ENCOUNTER — TRANSCRIPTION ENCOUNTER (OUTPATIENT)
Age: 40
End: 2025-06-03

## 2025-06-03 PROCEDURE — 99232 SBSQ HOSP IP/OBS MODERATE 35: CPT

## 2025-06-03 RX ORDER — QUETIAPINE FUMARATE 25 MG/1
1 TABLET ORAL
Qty: 0 | Refills: 0 | DISCHARGE
Start: 2025-06-03

## 2025-06-03 RX ORDER — CELECOXIB 50 MG/1
1 CAPSULE ORAL
Qty: 0 | Refills: 0 | DISCHARGE
Start: 2025-06-03

## 2025-06-03 RX ORDER — SODIUM HYPOCHLORITE 0.12 MG/ML
1 SOLUTION TOPICAL
Qty: 0 | Refills: 0 | DISCHARGE
Start: 2025-06-03

## 2025-06-03 RX ORDER — BISACODYL 5 MG
2 TABLET, DELAYED RELEASE (ENTERIC COATED) ORAL
Qty: 0 | Refills: 0 | DISCHARGE
Start: 2025-06-03

## 2025-06-03 RX ORDER — APIXABAN 2.5 MG/1
1 TABLET, FILM COATED ORAL
Qty: 0 | Refills: 0 | DISCHARGE
Start: 2025-06-03

## 2025-06-03 RX ORDER — B1/B2/B3/B5/B6/B12/VIT C/FOLIC 500-0.5 MG
1 TABLET ORAL
Qty: 0 | Refills: 0 | DISCHARGE
Start: 2025-06-03

## 2025-06-03 RX ORDER — MUPIROCIN CALCIUM 20 MG/G
1 CREAM TOPICAL
Qty: 0 | Refills: 0 | DISCHARGE
Start: 2025-06-03

## 2025-06-03 RX ORDER — WHITE PETROLATUM 1 G/G
1 OINTMENT TOPICAL
Qty: 0 | Refills: 0 | DISCHARGE
Start: 2025-06-03

## 2025-06-03 RX ORDER — ZINC SULFATE 50(220)MG
1 CAPSULE ORAL
Qty: 0 | Refills: 0 | DISCHARGE
Start: 2025-06-03

## 2025-06-03 RX ORDER — MELATONIN 5 MG
3 TABLET ORAL
Qty: 0 | Refills: 0 | DISCHARGE
Start: 2025-06-03

## 2025-06-03 RX ORDER — POVIDONE-IODINE 7.5 %
1 SOLUTION, NON-ORAL TOPICAL
Qty: 0 | Refills: 0 | DISCHARGE
Start: 2025-06-03

## 2025-06-03 RX ORDER — SEVELAMER HYDROCHLORIDE 800 MG/1
1 TABLET ORAL
Qty: 0 | Refills: 0 | DISCHARGE
Start: 2025-06-03

## 2025-06-03 RX ORDER — COLLAGENASE CLOSTRIDIUM HIST. 250 UNIT/G
1 OINTMENT (GRAM) TOPICAL
Qty: 0 | Refills: 0 | DISCHARGE
Start: 2025-06-03

## 2025-06-03 RX ORDER — NALOXEGOL OXALATE 12.5 MG/1
1 TABLET, FILM COATED ORAL
Qty: 0 | Refills: 0 | DISCHARGE
Start: 2025-06-03

## 2025-06-03 RX ORDER — CLOTRIMAZOLE 1 G/100G
1 CREAM TOPICAL
Qty: 0 | Refills: 0 | DISCHARGE
Start: 2025-06-03

## 2025-06-03 RX ORDER — TAMSULOSIN HYDROCHLORIDE 0.4 MG/1
2 CAPSULE ORAL
Qty: 0 | Refills: 0 | DISCHARGE
Start: 2025-06-03

## 2025-06-03 RX ORDER — LIDOCAINE HYDROCHLORIDE 20 MG/ML
2 JELLY TOPICAL
Qty: 0 | Refills: 0 | DISCHARGE
Start: 2025-06-03

## 2025-06-03 RX ORDER — GABAPENTIN 400 MG/1
3 CAPSULE ORAL
Qty: 0 | Refills: 0 | DISCHARGE
Start: 2025-06-03

## 2025-06-03 RX ORDER — POLYETHYLENE GLYCOL 3350 17 G/17G
17 POWDER, FOR SOLUTION ORAL
Qty: 0 | Refills: 0 | DISCHARGE
Start: 2025-06-03

## 2025-06-03 RX ORDER — METOPROLOL SUCCINATE 50 MG/1
1 TABLET, EXTENDED RELEASE ORAL
Qty: 0 | Refills: 0 | DISCHARGE
Start: 2025-06-03

## 2025-06-03 RX ORDER — MAGNESIUM OXIDE 400 MG
1 TABLET ORAL
Qty: 0 | Refills: 0 | DISCHARGE
Start: 2025-06-03

## 2025-06-03 RX ORDER — OXYCODONE HYDROCHLORIDE 30 MG/1
1 TABLET ORAL
Qty: 0 | Refills: 0 | DISCHARGE
Start: 2025-06-03

## 2025-06-03 RX ORDER — AMMONIUM LACTATE 12 %
1 LOTION (ML) TOPICAL
Qty: 0 | Refills: 0 | DISCHARGE
Start: 2025-06-03

## 2025-06-03 RX ADMIN — OXYCODONE HYDROCHLORIDE 20 MILLIGRAM(S): 30 TABLET ORAL at 21:06

## 2025-06-03 RX ADMIN — METOPROLOL SUCCINATE 50 MILLIGRAM(S): 50 TABLET, EXTENDED RELEASE ORAL at 17:38

## 2025-06-03 RX ADMIN — Medication 9 MILLIGRAM(S): at 21:10

## 2025-06-03 RX ADMIN — OXYCODONE HYDROCHLORIDE 15 MILLIGRAM(S): 30 TABLET ORAL at 18:42

## 2025-06-03 RX ADMIN — GABAPENTIN 1200 MILLIGRAM(S): 400 CAPSULE ORAL at 13:17

## 2025-06-03 RX ADMIN — Medication 1 APPLICATION(S): at 11:44

## 2025-06-03 RX ADMIN — SODIUM HYPOCHLORITE 1 APPLICATION(S): 0.12 SOLUTION TOPICAL at 06:42

## 2025-06-03 RX ADMIN — OXYCODONE HYDROCHLORIDE 15 MILLIGRAM(S): 30 TABLET ORAL at 17:42

## 2025-06-03 RX ADMIN — Medication 1 APPLICATION(S): at 17:29

## 2025-06-03 RX ADMIN — SEVELAMER HYDROCHLORIDE 800 MILLIGRAM(S): 800 TABLET ORAL at 11:43

## 2025-06-03 RX ADMIN — OXYCODONE HYDROCHLORIDE 20 MILLIGRAM(S): 30 TABLET ORAL at 13:24

## 2025-06-03 RX ADMIN — METOPROLOL SUCCINATE 50 MILLIGRAM(S): 50 TABLET, EXTENDED RELEASE ORAL at 06:41

## 2025-06-03 RX ADMIN — CELECOXIB 100 MILLIGRAM(S): 50 CAPSULE ORAL at 17:37

## 2025-06-03 RX ADMIN — Medication 40 MILLIGRAM(S): at 06:42

## 2025-06-03 RX ADMIN — Medication 220 MILLIGRAM(S): at 11:43

## 2025-06-03 RX ADMIN — CELECOXIB 100 MILLIGRAM(S): 50 CAPSULE ORAL at 17:39

## 2025-06-03 RX ADMIN — Medication 1 TABLET(S): at 11:43

## 2025-06-03 RX ADMIN — CELECOXIB 100 MILLIGRAM(S): 50 CAPSULE ORAL at 06:41

## 2025-06-03 RX ADMIN — GABAPENTIN 1200 MILLIGRAM(S): 400 CAPSULE ORAL at 21:06

## 2025-06-03 RX ADMIN — NALOXEGOL OXALATE 25 MILLIGRAM(S): 12.5 TABLET, FILM COATED ORAL at 11:42

## 2025-06-03 RX ADMIN — GABAPENTIN 1200 MILLIGRAM(S): 400 CAPSULE ORAL at 06:40

## 2025-06-03 RX ADMIN — Medication 400 MILLIGRAM(S): at 08:17

## 2025-06-03 RX ADMIN — Medication 500 MILLIGRAM(S): at 11:42

## 2025-06-03 RX ADMIN — SODIUM HYPOCHLORITE 1 APPLICATION(S): 0.12 SOLUTION TOPICAL at 17:29

## 2025-06-03 RX ADMIN — Medication 1 APPLICATION(S): at 06:43

## 2025-06-03 RX ADMIN — OXYCODONE HYDROCHLORIDE 20 MILLIGRAM(S): 30 TABLET ORAL at 12:24

## 2025-06-03 RX ADMIN — Medication 400 MILLIGRAM(S): at 17:38

## 2025-06-03 RX ADMIN — MUPIROCIN CALCIUM 1 APPLICATION(S): 20 CREAM TOPICAL at 17:30

## 2025-06-03 RX ADMIN — APIXABAN 5 MILLIGRAM(S): 2.5 TABLET, FILM COATED ORAL at 06:41

## 2025-06-03 RX ADMIN — QUETIAPINE FUMARATE 25 MILLIGRAM(S): 25 TABLET ORAL at 21:10

## 2025-06-03 RX ADMIN — Medication 1 APPLICATION(S): at 17:31

## 2025-06-03 RX ADMIN — Medication 400 MILLIGRAM(S): at 11:43

## 2025-06-03 RX ADMIN — TAMSULOSIN HYDROCHLORIDE 0.8 MILLIGRAM(S): 0.4 CAPSULE ORAL at 21:10

## 2025-06-03 RX ADMIN — SEVELAMER HYDROCHLORIDE 800 MILLIGRAM(S): 800 TABLET ORAL at 08:17

## 2025-06-03 RX ADMIN — APIXABAN 5 MILLIGRAM(S): 2.5 TABLET, FILM COATED ORAL at 17:38

## 2025-06-03 RX ADMIN — WHITE PETROLATUM 1 APPLICATION(S): 1 OINTMENT TOPICAL at 13:16

## 2025-06-03 RX ADMIN — SEVELAMER HYDROCHLORIDE 800 MILLIGRAM(S): 800 TABLET ORAL at 17:38

## 2025-06-03 NOTE — DISCHARGE NOTE PROVIDER - CARE PROVIDER_API CALL
Cheryl Orr  Physical/Rehab Medicine  [address] [place] [address]  Phone: [phone]  Fax: [phone]  Follow Up Time: 1 month   Cheryl Orr  Physical/Rehab Medicine  [address] [place] [address]  Phone: [phone]  Fax: [phone]  Follow Up Time: 1 month    Waterhouse, Joseph Cameron  Podiatric Medicine and Surgery  [address] [place] [address]  Phone: [phone]  Fax: [phone]  Follow Up Time: 1 week

## 2025-06-03 NOTE — DISCHARGE NOTE PROVIDER - NSDCFUADDAPPT_GEN_ALL_CORE_FT
Please follow up with your PCP as soon as possible.    [facility],   [address], [address]  [place] [address]  Phone: [phone]    If you are in need of a PCP or a specialist in your area: contact the [facility] Physician Referral Service at [phone].

## 2025-06-03 NOTE — PROGRESS NOTE ADULT - ASSESSMENT
39 year old male with PMH of morbid obesity, BEA on CPAP, pAFIB (not on AC prior to admission), HTN, and BL papilledema attributed to pseudotumor cerebri; who initially presented to  on 2/24 with SOB and BL LE edema. Found to have URI with progressive SOB and multifocal PNA on imaging. His hospital course was complicated by worsening respiratory distress and increased 02 demands secondary to secretions (requiring multiple intubation attempts) and eventual MICU admission. While in MICU, he 02 requirements continued despite optimal vent management. Concern noted for progressive ARDS, unable to prone given morbid obesity, and ultimately cannulated for vvECMO on 2/25 and transferred to [facility] for further management on 2/26.  His hospital course at [facility] was significant for the following: diuretic and ABX management, s/p trache and PEG (placed on 3/7- PEG since removed), RCU admission, high grade fevers (secondary to panniculitis), progressively worsening sacral ulcer (likely osteomyelitis- s/p surgical debridement), BL foot wound (secondary to pressor use), neuropathy (secondary to critical illness polyneuropathy), ELLA (secondary to vancomycin toxicity and overdiuresis- since DCd- with improvement). Patient now admitted for a multidisciplinary rehab program. 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    - Gait Instability, ADL impairments and Functional impairments: start Comprehensive Rehab Program of PT/OT  - 3 hours a day, 5 days a week   - PRN: Nebulizer QID     #BEA on BIPAP  --via nasal canula qhs    #Sacral Osteomyelitis  - Zosyn TID - completed on 5/12/25    #Paroxysmal AFIB  - Eliquis 5mg BID     #Calf Tenderness/pain b/l feet  - BL LE doppler negative 5/21   --Apply offloading leg boots when OOB      #L hip pain - resolved  - no acute findings on XR or CT   - L hip MRI (5/7) no significant findings     #HTN  - Amlodipine 10mg dialy     #Sleep/Mood  - Melatonin 9mg at HS   - Quetiapine 25mg at HS     #Skin  Wound Recs per Plastic Surgery (5/16):  Sacral Pressure Injury: BID- Cleanse with NS, remove excess fluid, apply santyl ointment to sloughing tissues, pack wound with 1-inch dry plain packing, cover with 4 inch gauze and abd combine  - R buttock: BID- Cleanse with NS, overlay calcium alginate, ABD combine dressing  Additional wound care instructions:  -->Right anterior thigh to groin isolated wound: Clean wound and periwound skin with NS. Pat dry. Apply liquid barrier film to periwound skin, allow to dry. Cover with silicone foam with border. Change daily or PRN if soiled   --> Bilateral abdominal pannus, bilateral groin: Cleanse with luke-warm soap and water, dry well. Apply clotrimazole  --> Bilateral feet: Apply betadine  to bilateral foot wounds followed by 4x4 gauze, ABD pad, and matilde daily per podiatry.  --> Continue to offload pressure; bariatric low airloss support surface, turn and position per protocol with use of fluidized positioning devices, continue incontinence and moisture care per protocol and use of single breathable incontinence pads, continue to offload heels with fluidized positioning devices. Continue use of bariatric seat cushion, limit sit time- no more than 2 consecutive hours at any given time.  - Pressure injury/Skin: OOB to Chair, PT/OT    - Ammonium lactate to BL LEs     #Neuropathy/sacral ulcer  - Gabapentin 1200mg TID --aim to taper to lower dose    #Pain Mgmt   - Capsaicin cream to BL feet QID - Avoid use on damaged, broken, or irritated skin. Avoid occlusive dressing or heat   - Lidocaine patch to BL thighs   - PRN:; Oxycodone and Tylenol 650mg QID   -celebrex 100mg bid for sacral pain   - neuropathic pain b/l feet c/w   gabapentin    #Morbid obesity  - Most recent weight 394lbs (5/7)     #GI/Bowel Mgmt   - Pantoprazole  - Bisacodyl 10mg at HS   - Miralax   - Naloxegal 25mg daily     #/Bladder Mgmt   #ELLA  - USKB 5/20 negative   - Renvela 800mg TID    #FEN --Morbid obesity (BMI > 40).  - Diet - Regular + Thins  [CCHO]    - MVI     # Health Management:   Environmental challenge affecting dc plan--narrow door and need for ramp to house entrance  However, patient making goal directed effort towards therapy goals     #Precautions / PROPHYLAXIS:   - Falls  - ortho: Weight bearing status: WBAT in surgical shoe   - Lungs: Aspiration, Incentive Spirometer   - DVT: Lovenox  ----------------------------------------------      Function as at 5/27  Stand pivot T/F during shower max x 2 persons  Pain b/l feet over areas with skin break on dorsum of foot, limiting wt bearing  Most transfers still  Sit to stand mod assist x 1 T/f bed to  Bussy board mod assist x 1  Took 4 steps on parallel bars with mo assist x 2  Making progress towards therapy goals  Barrier--ulcers on feet, limiting wt bearing, urinary hesitancy, environmental barrier at home (stairs), severe obesity  LE weakness  Overall making functional progress, has supportive family,  hence decision to extend DC date and achieve same home dc   Ext dc 6/6  (barriers as noted, making functional progress)  will discuss Home vs [facility] placement considering multiple issues--environmental barrier at home, large body habitus, difficulty with transfers    ----------------------------------------------  Dr. CANTU's Liaison with Family/Providers:  6/1- Met Patient's father during his visit to the unit during PM rounds, discssed review today, improved function, good pain control, appropriate use of offloading shoes  He is happy with this. Significant functional progress over last few days noted   The main challenge in DF strength, we will work on using ace wraps, and will get orthotics review for AFO if this persists    ----------------------------------------------  OUTPATIENT/FOLLOW UP:    Your Primary Care Provider,   Phone: (   )    -  Fax: (   )    -  Follow Up Time: 1 week    [facility] [place],   [address] [facility]  Phone: [phone]  Fax: (   )    -  Follow Up Time:    Dr. Waterhouse  Podiatry  [phone]  Within 1 week     Spent 53 mins, patient review, discussion of lab results, functional progress, observation in therapy and update to family,    Statement Selected  39 year old male with PMH of morbid obesity, BEA on CPAP, pAFIB (not on AC prior to admission), HTN, and BL papilledema attributed to pseudotumor cerebri; who initially presented to  on 2/24 with SOB and BL LE edema. Found to have URI with progressive SOB and multifocal PNA on imaging. His hospital course was complicated by worsening respiratory distress and increased 02 demands secondary to secretions (requiring multiple intubation attempts) and eventual MICU admission. While in MICU, he 02 requirements continued despite optimal vent management. Concern noted for progressive ARDS, unable to prone given morbid obesity, and ultimately cannulated for vvECMO on 2/25 and transferred to [facility] for further management on 2/26.  His hospital course at [facility] was significant for the following: diuretic and ABX management, s/p trache and PEG (placed on 3/7- PEG since removed), RCU admission, high grade fevers (secondary to panniculitis), progressively worsening sacral ulcer (likely osteomyelitis- s/p surgical debridement), BL foot wound (secondary to pressor use), neuropathy (secondary to critical illness polyneuropathy), ELLA (secondary to vancomycin toxicity and overdiuresis- since DCd- with improvement). Patient now admitted for a multidisciplinary rehab program. 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    - Gait Instability, ADL impairments and Functional impairments: start Comprehensive Rehab Program of PT/OT  - 3 hours a day, 5 days a week   - PRN: Nebulizer QID     #BEA on BIPAP  --via nasal canula qhs    #Sacral Osteomyelitis  - Zosyn TID - completed on 5/12/25    #Paroxysmal AFIB  - Eliquis 5mg BID     #Calf Tenderness/pain b/l feet  - BL LE doppler negative 5/21   --Apply offloading leg boots when OOB      #L hip pain - resolved  - no acute findings on XR or CT   - L hip MRI (5/7) no significant findings     #HTN  - Amlodipine 10mg dialy     #Sleep/Mood  - Melatonin 9mg at HS   - Quetiapine 25mg at HS     #Skin  Wound Recs per Plastic Surgery (5/16):  Sacral Pressure Injury: BID- Cleanse with NS, remove excess fluid, apply santyl ointment to sloughing tissues, pack wound with 1-inch dry plain packing, cover with 4 inch gauze and abd combine  - R buttock: BID- Cleanse with NS, overlay calcium alginate, ABD combine dressing  Additional wound care instructions:  -->Right anterior thigh to groin isolated wound: Clean wound and periwound skin with NS. Pat dry. Apply liquid barrier film to periwound skin, allow to dry. Cover with silicone foam with border. Change daily or PRN if soiled   --> Bilateral abdominal pannus, bilateral groin: Cleanse with luke-warm soap and water, dry well. Apply clotrimazole  --> Bilateral feet: Apply betadine  to bilateral foot wounds followed by 4x4 gauze, ABD pad, and matilde daily per podiatry.  --> Continue to offload pressure; bariatric low airloss support surface, turn and position per protocol with use of fluidized positioning devices, continue incontinence and moisture care per protocol and use of single breathable incontinence pads, continue to offload heels with fluidized positioning devices. Continue use of bariatric seat cushion, limit sit time- no more than 2 consecutive hours at any given time.  - Pressure injury/Skin: OOB to Chair, PT/OT    - Ammonium lactate to BL LEs     #Neuropathy/sacral ulcer  - Gabapentin 1200mg TID --aim to taper to lower dose    #Pain Mgmt   - Capsaicin cream to BL feet QID - Avoid use on damaged, broken, or irritated skin. Avoid occlusive dressing or heat   - Lidocaine patch to BL thighs   - PRN:; Oxycodone and Tylenol 650mg QID   -celebrex 100mg bid for sacral pain   - neuropathic pain b/l feet c/w   gabapentin    #Morbid obesity  - Most recent weight 394lbs (5/7)     #GI/Bowel Mgmt   - Pantoprazole  - Bisacodyl 10mg at HS   - Miralax   - Naloxegal 25mg daily     #/Bladder Mgmt   #ELLA  - USKB 5/20 negative   - Renvela 800mg TID    #FEN --Morbid obesity (BMI > 40).  - Diet - Regular + Thins  [CCHO]    - MVI     # Health Management:   Environmental challenge affecting dc plan--narrow door and need for ramp to house entrance  However, patient making goal directed effort towards therapy goals     #Precautions / PROPHYLAXIS:   - Falls  - ortho: Weight bearing status: WBAT in surgical shoe   - Lungs: Aspiration, Incentive Spirometer   - DVT: Lovenox  ----------------------------------------------      Function as at 6/2  Ambulating 35 ft  with RW wc follow through min assist (marked improvement)  Stand pivot transfers, bed to wc  Barrier--ulcers on feet, limiting wt bearing,  environmental barrier at home (stairs), severe obesity  Est dc revised to 6/13, issues with insurance, not approving [facility]      ----------------------------------------------  Dr. CANTU's Liaison with Family/Providers:  6/1- Met Patient's father during his visit to the unit during PM rounds, discssed review today, improved function, good pain control, appropriate use of offloading shoes  He is happy with this. Significant functional progress over last few days noted   The main challenge in DF strength, we will work on using ace wraps, and will get orthotics review for AFO if this persists    ----------------------------------------------  OUTPATIENT/FOLLOW UP:    Your Primary Care Provider,   Phone: (   )    -  Fax: (   )    -  Follow Up Time: 1 week    [facility],   [address]  Phone: [phone]  Fax: (   )    -  Follow Up Time:    Dr. Waterhouse  Podiatry  [phone]  Within 1 week

## 2025-06-03 NOTE — DISCHARGE NOTE PROVIDER - PROVIDER TOKENS
PROVIDER:[TOKEN:[[id number]],FOLLOWUP:[1 month]] PROVIDER:[TOKEN:[[id number]],FOLLOWUP:[1 month]],PROVIDER:[TOKEN:[[id number]],FOLLOWUP:[1 week]]

## 2025-06-03 NOTE — DISCHARGE NOTE PROVIDER - CARE PROVIDERS DIRECT ADDRESSES
,[email or web address]

## 2025-06-03 NOTE — PROGRESS NOTE ADULT - SUBJECTIVE AND OBJECTIVE BOX
Subjective  Patient seen and examined this AM.  Admits to sleeping well.   Noted with increased therapy tolerance.   Sacral pain reducing, sacral wound oozing significantly reduced.   Encouraged dorsiflexion exercises.     ROS--no acute pain, no fever or chest pain   B/l leg and sacral ulcers,  LBM 6/2    Function 5/29  -Right side lying to sitting EOB with HOB fully elevated needing MOD A for LE  management. Donned bilateral [employer] shoes provided by  this weekend.  -SPT with bariatric RW from Bed to wheelchair needing MOD A x 1 and stand by  assist of second person for safety.    Therapeutic Exercise  Seated UE strengthening following transfer OOB  -chest press with 7 lb. weighted bar  -shoulder flexion with 7 lb. weighted bar  -bicep curl with 7 lb. dumbbell    Observed during therapy, stood up in the shower during transfers, ,minimal pain over sole of foot  Tolerated therapy very well, No pain while sitting on his wc prolonged period    Vital Signs Last 24 Hrs  T(C): 36.7 (03 Jun 2025 08:13), Max: 36.8 (02 Jun 2025 21:25)  T(F): 98 (03 Jun 2025 08:13), Max: 98.3 (02 Jun 2025 21:25)  HR: 67 (03 Jun 2025 08:13) (67 - 92)  BP: 101/65 (03 Jun 2025 08:13) (101/65 - 115/74)  BP(mean): --  RR: 16 (03 Jun 2025 08:13) (16 - 16)  SpO2: 94% (03 Jun 2025 08:13) (94% - 95%)    EXAM  Gen - Comfortable, cheerful  HEENT - NAD  Neck - Supple, No limited ROM  Pulm - Clear  Cardiovascular - RRR, S1S2, No murmurs  Abdomen - Soft, non tender, +BS  Extremities - chronic skin changes with hyperpigmentation at shin, callus on feet    Neuro-  AAO X 3, Speech is normal     Motor - UE 5/5 Hip flexion 3/5, Knee 4/5 PF 4/5, DF 2/5     Sensory - Decreased to light touch bilateral lower extremities      Coordination - impaired lower extremities   Psychiatric - Mood stable, Affect WNL    Skin:  R foot plantar aspect lateral/distal side 7cm x 5.5cm ischemic wound from pressors  RLE 4th digit 1x1.5cm ischemic induced wound from pressors  RLE 5th digit 3x2cm ischemic induced wound from pressors  L foot plantar aspect lateral/distal side 6x7cm ischemic induced wound from pressor usage  LLE 4th digit 2x1cm ischemic induced wound from pressors  LLE 5th digit 2.5x3cm ischemic induced wound from pressors   R groin skin tear 1x1.5cm  Unstageable pressure wound cocyx 7cmx4.5cm w/ 2.5cm depth  R buttock pressure induced wound semi contiguous with coccyx wound calling unstageable given prior debridement 8.5x5.5cm  L buttock 1.0x0.5cm stage 3 pressure injury  R buttock skin tear 3.5cm x 0.5cm and 1.5cmx0.5cm  Dry skin with callus on sole of foot     MMS--antigravity upper extremities,        RECENT LABS/IMAGING                        9.1    7.02  )-----------( 503      ( 02 Jun 2025 07:10 )             31.2     06-02    144  |  106  |  11  ----------------------------<  101[H]  3.5   |  31  |  1.14    Ca    9.2      02 Jun 2025 07:10    TPro  7.2  /  Alb  2.0[L]  /  TBili  0.3  /  DBili  x   /  AST  21  /  ALT  16  /  AlkPhos  47  06-02      Urinalysis Basic - ( 02 Jun 2025 07:10 )    Color: x / Appearance: x / SG: x / pH: x  Gluc: 101 mg/dL / Ketone: x  / Bili: x / Urobili: x   Blood: x / Protein: x / Nitrite: x   Leuk Esterase: x / RBC: x / WBC x   Sq Epi: x / Non Sq Epi: x / Bacteria: x      MEDICATIONS  (STANDING):  ammonium lactate 12% Lotion 1 Application(s) Topical two times a day  apixaban 5 milliGRAM(s) Oral two times a day  ascorbic acid 500 milliGRAM(s) Oral daily  bisacodyl 10 milliGRAM(s) Oral at bedtime  capsaicin 0.025% Cream 1 Application(s) Topical four times a day  celecoxib 100 milliGRAM(s) Oral two times a day  clotrimazole 1% Cream 1 Application(s) Topical <User Schedule>  collagenase Ointment 1 Application(s) Topical two times a day  Dakins Solution - 1/4 Strength 1 Application(s) Topical two times a day  dextrose 5%. 1000 milliLiter(s) (50 mL/Hr) IV Continuous <Continuous>  dextrose 5%. 1000 milliLiter(s) (100 mL/Hr) IV Continuous <Continuous>  dextrose 50% Injectable 25 Gram(s) IV Push once  dextrose 50% Injectable 12.5 Gram(s) IV Push once  dextrose 50% Injectable 25 Gram(s) IV Push once  gabapentin 1200 milliGRAM(s) Oral every 8 hours  glucagon  Injectable 1 milliGRAM(s) IntraMuscular once  influenza   Vaccine 0.5 milliLiter(s) IntraMuscular once  lidocaine   4% Patch 1 Patch Transdermal daily  lidocaine   4% Patch 1 Patch Transdermal daily  magnesium oxide 400 milliGRAM(s) Oral three times a day with meals  melatonin 9 milliGRAM(s) Oral at bedtime  metoprolol tartrate 50 milliGRAM(s) Oral two times a day  multivitamin 1 Tablet(s) Oral daily  mupirocin 2% Ointment 1 Application(s) Topical two times a day  naloxegol 25 milliGRAM(s) Oral daily  pantoprazole    Tablet 40 milliGRAM(s) Oral before breakfast  petrolatum white Ointment 1 Application(s) Topical every 8 hours  polyethylene glycol 3350 17 Gram(s) Oral two times a day  povidone iodine 10% Solution 1 Application(s) Topical daily  QUEtiapine 25 milliGRAM(s) Oral at bedtime  sevelamer carbonate 800 milliGRAM(s) Oral three times a day with meals  silver nitrate Applicator 6 Application(s) Topical once  tamsulosin 0.8 milliGRAM(s) Oral at bedtime  zinc sulfate 220 milliGRAM(s) Oral daily    MEDICATIONS  (PRN):  albuterol/ipratropium for Nebulization 3 milliLiter(s) Nebulizer every 6 hours PRN Shortness of Breath and/or Wheezing  dextrose Oral Gel 15 Gram(s) Oral once PRN Blood Glucose LESS THAN 70 milliGRAM(s)/deciliter  oxyCODONE    IR 20 milliGRAM(s) Oral every 6 hours PRN Severe Pain (7 - 10)  oxyCODONE    IR 15 milliGRAM(s) Oral two times a day PRN Moderate Pain (4 - 6)  oxyCODONE    IR 10 milliGRAM(s) Oral two times a day PRN Mild Pain (1 - 3)     Subjective  Patient seen and examined this AM  Visited by a family member, who appeared to have provided so much motivation to patient  Admits to sleeping well.   Noted with increased therapy tolerance and engagement today  Sacral pain reducing, sacral wound no active bleeding   Reports minimal pain over sacral wound    ROS--no acute pain, no fever or chest pain   B/l leg and sacral ulcers,  LBM 6/2    Function --Significant functional improvement  Ambulated 35' x 2 trials with bariatric RW needing MIN A x 1 and wheelchair  follow. Continues to use modified step to 3 point gait pattern (leading with  Right LE) and excessive offloading with UEs on device due to bilateral foot  pain. [employer] shoes used today.  -Emphasized right foot placement with [employer] shoes secondary to worsening foot  drop with foot ware.    Vital Signs Last 24 Hrs  T(C): 36.7 (03 Jun 2025 08:13), Max: 36.8 (02 Jun 2025 21:25)  T(F): 98 (03 Jun 2025 08:13), Max: 98.3 (02 Jun 2025 21:25)  HR: 67 (03 Jun 2025 08:13) (67 - 92)  BP: 101/65 (03 Jun 2025 08:13) (101/65 - 115/74)  RR: 16 (03 Jun 2025 08:13) (16 - 16)  SpO2: 94% (03 Jun 2025 08:13) (94% - 95%)    EXAM  Gen - Comfortable, cheerful  HEENT - NAD  Neck - Supple, No limited ROM  Pulm - Clear  Cardiovascular - RRR, S1S2, No murmurs  Abdomen - Soft, non tender, +BS  Extremities - chronic skin changes with hyperpigmentation at shin, callus on feet    Neuro-  AAO X 3, Speech is normal     Motor - UE 5/5 Hip flexion 3/5, Knee 4/5 PF 4/5, DF 2/5     Sensory - Decreased to light touch bilateral lower extremities      Coordination - impaired lower extremities   Psychiatric - Mood stable, Affect WNL    Skin:  R foot plantar aspect lateral/distal side 7cm x 5.5cm ischemic wound from pressors  RLE 4th digit 1x1.5cm ischemic induced wound from pressors  RLE 5th digit 3x2cm ischemic induced wound from pressors  L foot plantar aspect lateral/distal side 6x7cm ischemic induced wound from pressor usage  LLE 4th digit 2x1cm ischemic induced wound from pressors  LLE 5th digit 2.5x3cm ischemic induced wound from pressors   R groin skin tear 1x1.5cm  Unstageable pressure wound cocyx 7cmx4.5cm w/ 2.5cm depth  R buttock pressure induced wound semi contiguous with coccyx wound calling unstageable given prior debridement 8.5x5.5cm  L buttock 1.0x0.5cm stage 3 pressure injury  R buttock skin tear 3.5cm x 0.5cm and 1.5cmx0.5cm  Dry skin with callus on sole of foot     MMS--antigravity upper extremities,        RECENT LABS/IMAGING                        9.1    7.02  )-----------( 503      ( 02 Jun 2025 07:10 )             31.2     06-02    144  |  106  |  11  ----------------------------<  101[H]  3.5   |  31  |  1.14    Ca    9.2      02 Jun 2025 07:10    TPro  7.2  /  Alb  2.0[L]  /  TBili  0.3  /  DBili  x   /  AST  21  /  ALT  16  /  AlkPhos  47  06-02      Urinalysis Basic - ( 02 Jun 2025 07:10 )    Color: x / Appearance: x / SG: x / pH: x  Gluc: 101 mg/dL / Ketone: x  / Bili: x / Urobili: x   Blood: x / Protein: x / Nitrite: x   Leuk Esterase: x / RBC: x / WBC x   Sq Epi: x / Non Sq Epi: x / Bacteria: x      MEDICATIONS  (STANDING):  ammonium lactate 12% Lotion 1 Application(s) Topical two times a day  apixaban 5 milliGRAM(s) Oral two times a day  ascorbic acid 500 milliGRAM(s) Oral daily  bisacodyl 10 milliGRAM(s) Oral at bedtime  capsaicin 0.025% Cream 1 Application(s) Topical four times a day  celecoxib 100 milliGRAM(s) Oral two times a day  clotrimazole 1% Cream 1 Application(s) Topical <User Schedule>  collagenase Ointment 1 Application(s) Topical two times a day  Dakins Solution - 1/4 Strength 1 Application(s) Topical two times a day  dextrose 5%. 1000 milliLiter(s) (50 mL/Hr) IV Continuous <Continuous>  dextrose 5%. 1000 milliLiter(s) (100 mL/Hr) IV Continuous <Continuous>  dextrose 50% Injectable 25 Gram(s) IV Push once  dextrose 50% Injectable 12.5 Gram(s) IV Push once  dextrose 50% Injectable 25 Gram(s) IV Push once  gabapentin 1200 milliGRAM(s) Oral every 8 hours  glucagon  Injectable 1 milliGRAM(s) IntraMuscular once  influenza   Vaccine 0.5 milliLiter(s) IntraMuscular once  lidocaine   4% Patch 1 Patch Transdermal daily  lidocaine   4% Patch 1 Patch Transdermal daily  magnesium oxide 400 milliGRAM(s) Oral three times a day with meals  melatonin 9 milliGRAM(s) Oral at bedtime  metoprolol tartrate 50 milliGRAM(s) Oral two times a day  multivitamin 1 Tablet(s) Oral daily  mupirocin 2% Ointment 1 Application(s) Topical two times a day  naloxegol 25 milliGRAM(s) Oral daily  pantoprazole    Tablet 40 milliGRAM(s) Oral before breakfast  petrolatum white Ointment 1 Application(s) Topical every 8 hours  polyethylene glycol 3350 17 Gram(s) Oral two times a day  povidone iodine 10% Solution 1 Application(s) Topical daily  QUEtiapine 25 milliGRAM(s) Oral at bedtime  sevelamer carbonate 800 milliGRAM(s) Oral three times a day with meals  silver nitrate Applicator 6 Application(s) Topical once  tamsulosin 0.8 milliGRAM(s) Oral at bedtime  zinc sulfate 220 milliGRAM(s) Oral daily    MEDICATIONS  (PRN):  albuterol/ipratropium for Nebulization 3 milliLiter(s) Nebulizer every 6 hours PRN Shortness of Breath and/or Wheezing  dextrose Oral Gel 15 Gram(s) Oral once PRN Blood Glucose LESS THAN 70 milliGRAM(s)/deciliter  oxyCODONE    IR 20 milliGRAM(s) Oral every 6 hours PRN Severe Pain (7 - 10)  oxyCODONE    IR 15 milliGRAM(s) Oral two times a day PRN Moderate Pain (4 - 6)  oxyCODONE    IR 10 milliGRAM(s) Oral two times a day PRN Mild Pain (1 - 3)

## 2025-06-03 NOTE — DISCHARGE NOTE PROVIDER - NSDCMRMEDTOKEN_GEN_ALL_CORE_FT
acetaminophen 325 mg oral tablet: 2 tab(s) orally every 6 hours As needed Temp greater or equal to 38C (100.4F), Mild Pain (1 - 3), Moderate Pain (4 - 6)  ammonium lactate 12% topical lotion: 1 Apply topically to affected area 2 times a day  apixaban 5 mg oral tablet: 1 tab(s) orally 2 times a day  ascorbic acid 500 mg oral tablet: 1 tab(s) orally once a day  bisacodyl 5 mg oral delayed release tablet: 2 tab(s) orally once a day (at bedtime)  capsaicin 0.025% topical cream: 1 Apply topically to affected area 4 times a day  celecoxib 100 mg oral capsule: 1 cap(s) orally 2 times a day  clotrimazole 1% topical cream: Apply topically to affected area once a day 1 Apply topically to affected area  collagenase 250 units/g topical ointment: 1 Apply topically to affected area 2 times a day  gabapentin 400 mg oral capsule: 3 cap(s) orally every 8 hours  lidocaine 4% topical film: Apply topically to affected area once a day Apply to affected areas from 8AM-8PM  magnesium oxide 400 mg oral tablet: 1 tab(s) orally 3 times a day (with meals)  melatonin 3 mg oral tablet: 3 tab(s) orally once a day (at bedtime)  metoprolol tartrate 50 mg oral tablet: 1 tab(s) orally 2 times a day  Multiple Vitamins oral tablet: 1 tab(s) orally once a day  mupirocin 2% topical ointment: 1 Apply topically to affected area 2 times a day  naloxegol 25 mg oral tablet: 1 tab(s) orally once a day  oxyCODONE 10 mg oral tablet: 1 tab(s) orally 2 times a day As needed Mild Pain (1 - 3)  oxyCODONE 15 mg oral tablet: 1 tab(s) orally every 6 hours As needed Severe Pain (7 - 10)  oxyCODONE 5 mg oral tablet: 1 tab(s) orally 2 times a day As needed Severe Pain (7 - 10) related to dressing change  pantoprazole 40 mg oral delayed release tablet: 1 tab(s) orally once a day (before a meal)  petrolatum topical ointment: 1 Apply topically to affected area every 8 hours  polyethylene glycol 3350 oral powder for reconstitution: 17 gram(s) orally 2 times a day  povidone iodine 10% topical solution: 1 Apply topically to affected area once a day  QUEtiapine 25 mg oral tablet: 1 tab(s) orally once a day (at bedtime)  sevelamer carbonate 800 mg oral tablet: 1 tab(s) orally 3 times a day (with meals)  sodium hypochlorite 0.125% topical solution: 1 Apply topically to affected area 2 times a day  tamsulosin 0.4 mg oral capsule: 2 cap(s) orally once a day (at bedtime)  zinc sulfate 220 mg (as elemental zinc 50 mg) oral capsule: 1 cap(s) orally once a day   acetaminophen 325 mg oral tablet: 2 tab(s) orally every 6 hours As needed Temp greater or equal to 38C (100.4F), Mild Pain (1 - 3), Moderate Pain (4 - 6)  ammonium lactate 12% topical lotion: 1 Apply topically to affected area 2 times a day  apixaban 5 mg oral tablet: 1 tab(s) orally 2 times a day  ascorbic acid 500 mg oral tablet: 1 tab(s) orally once a day  bisacodyl 5 mg oral delayed release tablet: 2 tab(s) orally once a day (at bedtime)  capsaicin 0.025% topical cream: 1 Apply topically to affected area 4 times a day  celecoxib 100 mg oral capsule: 1 cap(s) orally 2 times a day  clotrimazole 1% topical cream: Apply topically to affected area once a day 1 Apply topically to affected area  collagenase 250 units/g topical ointment: 1 Apply topically to affected area 2 times a day  gabapentin 400 mg oral capsule: 3 cap(s) orally every 8 hours  lidocaine 4% topical film: Apply topically to affected area once a day Apply to affected areas from 8AM-8PM  magnesium oxide 400 mg oral tablet: 1 tab(s) orally 3 times a day (with meals)  melatonin 3 mg oral tablet: 3 tab(s) orally once a day (at bedtime)  metoprolol tartrate 50 mg oral tablet: 1 tab(s) orally 2 times a day  Multiple Vitamins oral tablet: 1 tab(s) orally once a day  mupirocin 2% topical ointment: 1 Apply topically to affected area 2 times a day  naloxegol 25 mg oral tablet: 1 tab(s) orally once a day  Orthotic Evaluation: Orthotic Evaluation   Solid ankle BL AFO  BL foot drop  ICD-10: M21.37  oxyCODONE 10 mg oral tablet: 1 tab(s) orally 2 times a day As needed Mild Pain (1 - 3)  oxyCODONE 15 mg oral tablet: 1 tab(s) orally every 6 hours As needed Severe Pain (7 - 10)  oxyCODONE 5 mg oral tablet: 1 tab(s) orally 2 times a day As needed Severe Pain (7 - 10) related to dressing change  pantoprazole 40 mg oral delayed release tablet: 1 tab(s) orally once a day (before a meal)  petrolatum topical ointment: 1 Apply topically to affected area every 8 hours  polyethylene glycol 3350 oral powder for reconstitution: 17 gram(s) orally 2 times a day  povidone iodine 10% topical solution: 1 Apply topically to affected area once a day  QUEtiapine 25 mg oral tablet: 1 tab(s) orally once a day (at bedtime)  sevelamer carbonate 800 mg oral tablet: 1 tab(s) orally 3 times a day (with meals)  sodium hypochlorite 0.125% topical solution: 1 Apply topically to affected area 2 times a day  tamsulosin 0.4 mg oral capsule: 2 cap(s) orally once a day (at bedtime)  zinc sulfate 220 mg (as elemental zinc 50 mg) oral capsule: 1 cap(s) orally once a day   acetaminophen 325 mg oral tablet: 2 tab(s) orally every 6 hours As needed Temp greater or equal to 38C (100.4F), Mild Pain (1 - 3), Moderate Pain (4 - 6)  Albuterol (Eqv-Ventolin HFA) 90 mcg/inh inhalation aerosol: 2 puff(s) inhaled every 6 hours  ammonium lactate 12% topical lotion: 1 Apply topically to affected area 2 times a day  amoxicillin-clavulanate 875 mg-125 mg oral tablet: 1 tab(s) orally 2 times a day Compelte entire course. MDD: 2 tabs  apixaban 5 mg oral tablet: 1 tab(s) orally 2 times a day  ascorbic acid 500 mg oral tablet: 1 tab(s) orally once a day  bisacodyl 5 mg oral delayed release tablet: 2 tab(s) orally once a day (at bedtime)  capsaicin 0.025% topical cream: 1 Apply topically to affected area 4 times a day  collagenase 250 units/g topical ointment: 1 Apply topically to affected area 2 times a day  gabapentin 400 mg oral capsule: 3 cap(s) orally every 8 hours MDD: 9 tabs  lidocaine 4% topical film: Apply topically to affected area once a day Apply to affected areas from 8AM-8PM  magnesium oxide 400 mg oral tablet: 1 tab(s) orally 3 times a day (with meals)  melatonin 3 mg oral tablet: 3 tab(s) orally once a day (at bedtime)  metoprolol: 12.5 milligram(s) orally 2 times a day  Multiple Vitamins oral tablet: 1 tab(s) orally once a day  mupirocin 2% topical ointment: Apply topically to affected area 2 times a day 1 Apply topically to affected area 2 times a day  naloxegol 25 mg oral tablet: 1 tab(s) orally once a day  oxyCODONE 5 mg oral tablet: 1 tab(s) orally every 6 hours as needed for  moderate pain Please take 1 tablet every 6 hours for moderate pain.  Please take 2 tablets every 6 hours for severe pain. MDD: 8  pantoprazole 40 mg oral delayed release tablet: 1 tab(s) orally once a day (before a meal)  petrolatum topical ointment: 1 Apply topically to affected area every 8 hours  polyethylene glycol 3350 oral powder for reconstitution: 17 gram(s) orally 2 times a day  povidone iodine 10% topical solution: 1 Apply topically to affected area once a day  QUEtiapine 25 mg oral tablet: 1 tab(s) orally once a day (at bedtime)  sevelamer carbonate 800 mg oral tablet: 1 tab(s) orally 3 times a day (with meals)  sodium hypochlorite 0.125% topical solution: 1 Apply topically to affected area 2 times a day  zinc sulfate 220 mg (as elemental zinc 50 mg) oral capsule: 1 cap(s) orally once a day   acetaminophen 325 mg oral tablet: 2 tab(s) orally every 6 hours As needed Temp greater or equal to 38C (100.4F), Mild Pain (1 - 3), Moderate Pain (4 - 6)  Albuterol (Eqv-Ventolin HFA) 90 mcg/inh inhalation aerosol: 2 puff(s) inhaled every 6 hours  ammonium lactate 12% topical lotion: 1 Apply topically to affected area 2 times a day  amoxicillin-clavulanate 875 mg-125 mg oral tablet: 1 tab(s) orally 2 times a day Compelte entire course. MDD: 2 tabs  apixaban 5 mg oral tablet: 1 tab(s) orally 2 times a day  ascorbic acid 500 mg oral tablet: 1 tab(s) orally once a day  bisacodyl 5 mg oral delayed release tablet: 2 tab(s) orally once a day (at bedtime)  capsaicin 0.025% topical cream: 1 Apply topically to affected area 4 times a day  clotrimazole 1% topical cream: Apply topically to affected area once a day 1 Apply topically to affected area  collagenase 250 units/g topical ointment: Apply topically to affected area 2 times a day 1 Apply topically to affected area 2 times a day  gabapentin 400 mg oral capsule: 3 cap(s) orally every 8 hours MDD: 9 tabs  lidocaine 4% topical film: Apply topically to affected area once a day Apply to affected areas from 8AM-8PM  magnesium oxide 400 mg oral tablet: 1 tab(s) orally 3 times a day (with meals)  melatonin 3 mg oral tablet: 3 tab(s) orally once a day (at bedtime)  metoprolol: 12.5 milligram(s) orally 2 times a day  Multiple Vitamins oral tablet: 1 tab(s) orally once a day  mupirocin 2% topical ointment: Apply topically to affected area 2 times a day 1 Apply topically to affected area 2 times a day  naloxegol 25 mg oral tablet: 1 tab(s) orally once a day  oxyCODONE 5 mg oral tablet: 1 tab(s) orally every 6 hours as needed for  moderate pain Please take 1 tablet every 6 hours for moderate pain.  Please take 2 tablets every 6 hours for severe pain. MDD: 8  pantoprazole 40 mg oral delayed release tablet: 1 tab(s) orally once a day (before a meal)  petrolatum topical ointment: 1 Apply topically to affected area every 8 hours  polyethylene glycol 3350 oral powder for reconstitution: 17 gram(s) orally 2 times a day  povidone iodine 10% topical solution: 1 Apply topically to affected area once a day  QUEtiapine 25 mg oral tablet: 1 tab(s) orally once a day (at bedtime)  sevelamer carbonate 800 mg oral tablet: 1 tab(s) orally 3 times a day (with meals)  sodium hypochlorite 0.125% topical solution: 1 Apply topically to affected area 2 times a day  zinc sulfate 220 mg (as elemental zinc 50 mg) oral capsule: 1 cap(s) orally once a day

## 2025-06-03 NOTE — DISCHARGE NOTE PROVIDER - DETAILS OF MALNUTRITION DIAGNOSIS/DIAGNOSES
This patient has been assessed with a concern for Malnutrition and was treated during this hospitalization for the following Nutrition diagnosis/diagnoses:     -  04/27/2025: Morbid obesity (BMI > 40)

## 2025-06-03 NOTE — DISCHARGE NOTE PROVIDER - NSDCFUADDINST_GEN_ALL_CORE_FT
BL foot Wound Care: Please apply betadine to bilateral foot wounds followed by 4x4 gauze, ABD pad, and matilde daily   Wound Care Instructions:  Bilateral abdominal pannus: Cleanse with luke-warm soap and water, dry well. Apply clotrimazole    Bilateral feet: Apply betadine  to bilateral foot wounds followed by 4x4 gauze, ABD pad, and matilde daily per podiatry.    R buttock: Cleanse wounds with 0.9% NS, pat dry with clean guaze.  Apply Cavilon to macerated periwound.  Apply Hydrocolloid to be changed 3-5 days.      Sacral wound: Daily, apply Santyl to Aquacel Ag Advantage ribbon and insert in tunneled areas.  Apply a small Aquacel Ag Extra to opening of wound and cover with ABD pad and secure with micro tape, and use window dressing technique.

## 2025-06-03 NOTE — DISCHARGE NOTE PROVIDER - HOSPITAL COURSE
HPI:  Sunny Quintanilla is a 39 year old male with PMH of morbid obesity, BEA on CPAP, pAFIB (not on AC), HTN, and BL papilledema attributed to pseudotumor cerebri; who initially presented to  on 2/24 with SOB and BL LE edema. Found to have URI with progressive SOB and multifocal PNA on imaging. His hospital course was complicated by worsening respiratory distress and increased 02 demands secondary to secretions (requiring multiple intubation attempts) and eventual MICU admission. While in MICU, he 02 requirements continued despite optimal vent management. Concern noted for progressive ARDS, unable to prone given morbid obesity, and ultimately cannulated for vvECMO on 2/25 and transferred to [facility] for further management on 2/26.    His hospital course at [facility] was significant for the following: diuretic and ABX management, s/p trache and PEG (placed on 3/7- PEG since removed), RCU admission, high grade fevers (secondary to panniculitis), progressively worsening sacral ulcer (likely osteomyelitis- s/p surgical debridement), BL foot wound (secondary to pressor use), neuropathy (secondary to critical illness polyneuropathy), ELLA (secondary to vancomycin toxicity and overdiuresis- since DCd- with improvement). PM&R was consulted and deemed him an appropriate candidate for IRF. He was medically stabilized and cleared for discharge to [facility].  (25 Apr 2025 15:05)      Patient was evaluated by PM&R and therapy for gait/ADL impairments and recommended acute rehabilitation. Patient was medically optimized for discharge to [facility] on 4/25/25. Admitted with gait instability, ADL, and functional impairments.     Rehab course significant for:      All other medical co-morbidities were stable. Patient tolerated course of inpatient PT/OT/SLP rehab with significant improvements and met rehab goals prior to discharge. Patient was medically cleared on ___ for discharge to home.   HPI:  Sunny Quintanilla is a 39 year old male with PMH of morbid obesity, BEA on CPAP, pAFIB (not on AC), HTN, and BL papilledema attributed to pseudotumor cerebri; who initially presented to  on 2/24 with SOB and BL LE edema. Found to have URI with progressive SOB and multifocal PNA on imaging. His hospital course was complicated by worsening respiratory distress and increased 02 demands secondary to secretions (requiring multiple intubation attempts) and eventual MICU admission. While in MICU, he 02 requirements continued despite optimal vent management. Concern noted for progressive ARDS, unable to prone given morbid obesity, and ultimately cannulated for vvECMO on 2/25 and transferred to [facility] for further management on 2/26.    His hospital course at [facility] was significant for the following: diuretic and ABX management, s/p trache and PEG (placed on 3/7- PEG since removed), RCU admission, high grade fevers (secondary to panniculitis), progressively worsening sacral ulcer (likely osteomyelitis- s/p surgical debridement), BL foot wound (secondary to pressor use), neuropathy (secondary to critical illness polyneuropathy), ELLA (secondary to vancomycin toxicity and overdiuresis- since DCd- with improvement). PM&R was consulted and deemed him an appropriate candidate for IRF. He was medically stabilized and cleared for discharge to [facility].  (25 Apr 2025 15:05)      Patient was evaluated by PM&R and therapy for gait/ADL impairments and recommended acute rehabilitation. Patient was medically optimized for discharge to [facility] on 4/25/25. Admitted with gait instability, ADL, and functional impairments.     Rehab course significant for:  4/26: discharged from speech therapy  4/29: Amm lac to BL feet, BL LE doppler negative   4/30: Bandaid to trache site. Midline malfunctioning- DCd, peripheral IV placed.   5/1: Wound vac placed for surgical wound, bladder scan, start flomax, prafo boots at HS.   5/2: Await PSx CTAP review, wound vac placed?, await gen surgery recs.   5/6: Bandaid to trache. CT of L hip negative for any findings. Bedside therapy today for BL UEs and trunk control, start Celebrex 100mg BID.   5/7: DC BS. Await MRI of L hip.   5/8: Celebrex to 200mg BID. ID- finish Zosyn. L hip MRI without significant findings.   5/9: melatonin to 9mg   5/13: Zosyn completed. Per Plastic surgery, remove vac and pack with aquacel AG BID. Plastic Surgery to re-eval tomorrow.   5/15: Celebrex completed, bandaid to trache site, vaseline for dry lips, wound orders revised.   5/16: Remove IV   5/20: USKB negative  5/21: Significant foot pain, Start Lyrica, BL LE doppler negative  5/22: Await offloading shoe, to be delivered by orthotics 5/23 5/23: Await offloading shoe  5/27: Therapy on hold for sacral pain, ICU bed malcuntion  5/28: Lyrica and Cymbalta DCD. Celebrex 200 BID. PSx re-eval.   5/29: --   5/30: Celebrex 100mg BID  6/2: Await podiatry consult. ACE wrap BL feet into dorsiflexion.   6/3: --   6/5: Metoprolol reduced. BL feet callous sloughing.   6/6: Await wound care recs.   6/7-6/8 uneventful  6/9: DC celebrex  6/10: Lactulose x1 dose. RRT for hypotension, hypoxia, and tachycardia, given IVF  6/11: CXR with mild upper lobe infiltrate, encouraged cpap, started on IV ABX will transition to oral on DC, lactuloste x1 dose,  6/12: Incentive spirometer. DC home 6/13 6/13: DC home       All other medical co-morbidities were stable. Patient tolerated course of inpatient PT/OT/SLP rehab with significant improvements and met rehab goals prior to discharge. Patient was medically cleared on 6/13/25 for discharge to home.   HPI:  Sunny Quintanilla is a 39 year old male with PMH of morbid obesity, BEA on CPAP, pAFIB (not on AC), HTN, and BL papilledema attributed to pseudotumor cerebri; who initially presented to  on 2/24 with SOB and BL LE edema. Found to have URI with progressive SOB and multifocal PNA on imaging. His hospital course was complicated by worsening respiratory distress and increased 02 demands secondary to secretions (requiring multiple intubation attempts) and eventual MICU admission. While in MICU, he 02 requirements continued despite optimal vent management. Concern noted for progressive ARDS, unable to prone given morbid obesity, and ultimately cannulated for vvECMO on 2/25 and transferred to [facility] for further management on 2/26.    His hospital course at [facility] was significant for the following: diuretic and ABX management, s/p trache and PEG (placed on 3/7- PEG since removed), RCU admission, high grade fevers (secondary to panniculitis), progressively worsening sacral ulcer (likely osteomyelitis- s/p surgical debridement), BL foot wound (secondary to pressor use), neuropathy (secondary to critical illness polyneuropathy), ELLA (secondary to vancomycin toxicity and overdiuresis- since DCd- with improvement). PM&R was consulted and deemed him an appropriate candidate for IRF. He was medically stabilized and cleared for discharge to [facility].  (25 Apr 2025 15:05)      Patient was evaluated by PM&R and therapy for gait/ADL impairments and recommended acute rehabilitation. Patient was medically optimized for discharge to [facility] on 4/25/25. Admitted with gait instability, ADL, and functional impairments.     Rehab course significant for:  4/26: discharged from speech therapy  4/29: Amm lac to BL feet, BL LE doppler negative   4/30: Bandaid to trache site. Midline malfunctioning- DCd, peripheral IV placed.   5/1: Wound vac placed for surgical wound, bladder scan, start flomax, prafo boots at HS.   5/2: Await PSx CTAP review, wound vac placed?, await gen surgery recs.   5/6: Bandaid to trache. CT of L hip negative for any findings. Bedside therapy today for BL UEs and trunk control, start Celebrex 100mg BID.   5/7: DC BS. Await MRI of L hip.   5/8: Celebrex to 200mg BID. ID- finish Zosyn. L hip MRI without significant findings.   5/9: melatonin to 9mg   5/13: Zosyn completed. Per Plastic surgery, remove vac and pack with aquacel AG BID. Plastic Surgery to re-eval tomorrow.   5/15: Celebrex completed, bandaid to trache site, vaseline for dry lips, wound orders revised.   5/16: Remove IV   5/20: USKB negative  5/21: Significant foot pain, Start Lyrica, BL LE doppler negative  5/22: Await offloading shoe, to be delivered by orthotics 5/23 5/23: Await offloading shoe  5/27: Therapy on hold for sacral pain, ICU bed malcuntion  5/28: Lyrica and Cymbalta DCD. Celebrex 200 BID. PSx re-eval.   5/30: Celebrex 100mg BID  6/2: Await podiatry consult. ACE wrap BL feet into dorsiflexion.   6/3: --   6/5: Metoprolol reduced. BL feet callous sloughing.   6/6: Await wound care recs.   6/7-6/8 uneventful  6/9: DC celebrex  6/10: Lactulose x1 dose. RRT for hypotension, hypoxia, and tachycardia, given IVF  6/11: CXR with mild upper lobe infiltrate, encouraged cpap, started on IV ABX will transition to oral on DC, lactuloste x1 dose,  6/12: Incentive spirometer. DC home 6/13 6/13: DC home     You made sustained progress during your rehab treatment  Achieved your functional goals  Multiple specialties were involved in your care including plastics, surgery, hospitalist  Your neuropathic symptoms responded to treatment but required high dose of gabapentin  You will continue home care including wound care to sacral ulcer for which your family also received teaching   Nightly use of CPAP recommended  F/u with orthotist for b/l AFO brace  F/u with multiple specialty teams     Function as at 6/10  Ambulating 75 ft  with RW CS  Barrier--ulcers on feet, limiting wt bearing,  environmental barrier at home (stairs), severe obesity  Est dc 6/13,     All other medical co-morbidities were stable. Patient tolerated course of inpatient PT/OT/SLP rehab with significant improvements and met rehab goals prior to discharge. Patient was medically stable  6/13/25 for discharge to home.

## 2025-06-03 NOTE — DISCHARGE NOTE PROVIDER - NSDCCPCAREPLAN_GEN_ALL_CORE_FT
PRINCIPAL DISCHARGE DIAGNOSIS  Diagnosis: Debility  Assessment and Plan of Treatment: You presented to the hospital with SOB and BL LE swelling. You were found to have an upper respiratory infection that had many complications. You were sent to  acute rehab to help improve your strength and overall function. Please follow up with the following providers listed in this packet for further management. Bring this packet to your follow up appointments.      SECONDARY DISCHARGE DIAGNOSES  Diagnosis: Encounter for pain management  Assessment and Plan of Treatment: Continue your analgesics as prescribed.      Diagnosis: Paroxysmal atrial fibrillation  Assessment and Plan of Treatment: Continue Eliquis. Please follow up with your Cardiologist/PCP for ongoing management.    Diagnosis: Encounter for wound care  Assessment and Plan of Treatment: Continue your wound care for your sacral wound and BL feet as instructed. Please follow up with the wound care clinic on [facility] for ongoing management.    Diagnosis: Neuropathy  Assessment and Plan of Treatment: Continue Gabapentin as instructed.     PRINCIPAL DISCHARGE DIAGNOSIS  Diagnosis: Debility  Assessment and Plan of Treatment: You presented to the hospital with SOB and BL LE swelling. You were found to have an upper respiratory infection that had many complications. You were sent to  acute rehab to help improve your strength and overall function. Please follow up with the following providers listed in this packet for further management. Bring this packet to your follow up appointments.      SECONDARY DISCHARGE DIAGNOSES  Diagnosis: Encounter for pain management  Assessment and Plan of Treatment: Continue your analgesics as prescribed.      Diagnosis: Paroxysmal atrial fibrillation  Assessment and Plan of Treatment: Continue Eliquis. Please follow up with your Cardiologist/PCP for ongoing management.    Diagnosis: Encounter for wound care  Assessment and Plan of Treatment: Continue your wound care for your sacral wound and BL feet as instructed. Please follow up with the wound care clinic on [facility] for ongoing management of sacral wound, and podiatry for BL feet.    Diagnosis: Neuropathy  Assessment and Plan of Treatment: Continue Gabapentin as instructed.     PRINCIPAL DISCHARGE DIAGNOSIS  Diagnosis: Debility  Assessment and Plan of Treatment: You presented to the hospital with SOB and BL LE swelling. You were found to have an upper respiratory infection that had many complications. You were sent to  acute rehab to help improve your strength and overall function. Please follow up with the following providers listed in this packet for further management. Bring this packet to your follow up appointments.      SECONDARY DISCHARGE DIAGNOSES  Diagnosis: Encounter for pain management  Assessment and Plan of Treatment: Continue your analgesics as prescribed.      Diagnosis: Paroxysmal atrial fibrillation  Assessment and Plan of Treatment: Continue Eliquis. Please follow up with your Cardiologist/PCP for ongoing management.    Diagnosis: Encounter for wound care  Assessment and Plan of Treatment: Continue your wound care for your sacral wound and BL feet as instructed. Please follow up with the wound care clinic on [facility] for ongoing management of sacral wound, and podiatry for BL feet.    Diagnosis: Neuropathy  Assessment and Plan of Treatment: Continue Gabapentin as instructed.    Diagnosis: Pneumonia  Assessment and Plan of Treatment: You were found to have Pneumonia. Please continue your oral antibiotics as prescribed. Continue to use Cpap at night, and use incentive spirometer during waking hours.

## 2025-06-04 PROCEDURE — 99232 SBSQ HOSP IP/OBS MODERATE 35: CPT

## 2025-06-04 RX ADMIN — OXYCODONE HYDROCHLORIDE 15 MILLIGRAM(S): 30 TABLET ORAL at 18:11

## 2025-06-04 RX ADMIN — METOPROLOL SUCCINATE 50 MILLIGRAM(S): 50 TABLET, EXTENDED RELEASE ORAL at 06:06

## 2025-06-04 RX ADMIN — METOPROLOL SUCCINATE 50 MILLIGRAM(S): 50 TABLET, EXTENDED RELEASE ORAL at 18:07

## 2025-06-04 RX ADMIN — Medication 500 MILLIGRAM(S): at 12:25

## 2025-06-04 RX ADMIN — OXYCODONE HYDROCHLORIDE 15 MILLIGRAM(S): 30 TABLET ORAL at 19:11

## 2025-06-04 RX ADMIN — OXYCODONE HYDROCHLORIDE 20 MILLIGRAM(S): 30 TABLET ORAL at 08:55

## 2025-06-04 RX ADMIN — Medication 400 MILLIGRAM(S): at 07:55

## 2025-06-04 RX ADMIN — OXYCODONE HYDROCHLORIDE 20 MILLIGRAM(S): 30 TABLET ORAL at 23:00

## 2025-06-04 RX ADMIN — GABAPENTIN 1200 MILLIGRAM(S): 400 CAPSULE ORAL at 06:05

## 2025-06-04 RX ADMIN — OXYCODONE HYDROCHLORIDE 20 MILLIGRAM(S): 30 TABLET ORAL at 22:07

## 2025-06-04 RX ADMIN — SODIUM HYPOCHLORITE 1 APPLICATION(S): 0.12 SOLUTION TOPICAL at 18:01

## 2025-06-04 RX ADMIN — Medication 220 MILLIGRAM(S): at 12:24

## 2025-06-04 RX ADMIN — Medication 1 APPLICATION(S): at 06:06

## 2025-06-04 RX ADMIN — OXYCODONE HYDROCHLORIDE 20 MILLIGRAM(S): 30 TABLET ORAL at 07:55

## 2025-06-04 RX ADMIN — GABAPENTIN 1200 MILLIGRAM(S): 400 CAPSULE ORAL at 13:10

## 2025-06-04 RX ADMIN — SEVELAMER HYDROCHLORIDE 800 MILLIGRAM(S): 800 TABLET ORAL at 12:24

## 2025-06-04 RX ADMIN — Medication 400 MILLIGRAM(S): at 12:25

## 2025-06-04 RX ADMIN — Medication 400 MILLIGRAM(S): at 18:07

## 2025-06-04 RX ADMIN — QUETIAPINE FUMARATE 25 MILLIGRAM(S): 25 TABLET ORAL at 22:03

## 2025-06-04 RX ADMIN — NALOXEGOL OXALATE 25 MILLIGRAM(S): 12.5 TABLET, FILM COATED ORAL at 12:25

## 2025-06-04 RX ADMIN — CELECOXIB 100 MILLIGRAM(S): 50 CAPSULE ORAL at 06:06

## 2025-06-04 RX ADMIN — Medication 40 MILLIGRAM(S): at 06:06

## 2025-06-04 RX ADMIN — Medication 1 TABLET(S): at 12:25

## 2025-06-04 RX ADMIN — Medication 9 MILLIGRAM(S): at 22:03

## 2025-06-04 RX ADMIN — SODIUM HYPOCHLORITE 1 APPLICATION(S): 0.12 SOLUTION TOPICAL at 06:07

## 2025-06-04 RX ADMIN — MUPIROCIN CALCIUM 1 APPLICATION(S): 20 CREAM TOPICAL at 18:07

## 2025-06-04 RX ADMIN — Medication 1 APPLICATION(S): at 12:22

## 2025-06-04 RX ADMIN — SEVELAMER HYDROCHLORIDE 800 MILLIGRAM(S): 800 TABLET ORAL at 18:07

## 2025-06-04 RX ADMIN — CELECOXIB 100 MILLIGRAM(S): 50 CAPSULE ORAL at 18:06

## 2025-06-04 RX ADMIN — SEVELAMER HYDROCHLORIDE 800 MILLIGRAM(S): 800 TABLET ORAL at 07:55

## 2025-06-04 RX ADMIN — APIXABAN 5 MILLIGRAM(S): 2.5 TABLET, FILM COATED ORAL at 18:07

## 2025-06-04 RX ADMIN — GABAPENTIN 1200 MILLIGRAM(S): 400 CAPSULE ORAL at 22:03

## 2025-06-04 RX ADMIN — Medication 1 APPLICATION(S): at 18:01

## 2025-06-04 RX ADMIN — Medication 1 APPLICATION(S): at 18:02

## 2025-06-04 RX ADMIN — CELECOXIB 100 MILLIGRAM(S): 50 CAPSULE ORAL at 18:08

## 2025-06-04 RX ADMIN — TAMSULOSIN HYDROCHLORIDE 0.8 MILLIGRAM(S): 0.4 CAPSULE ORAL at 22:03

## 2025-06-04 RX ADMIN — APIXABAN 5 MILLIGRAM(S): 2.5 TABLET, FILM COATED ORAL at 06:06

## 2025-06-04 NOTE — PROGRESS NOTE ADULT - ASSESSMENT
39 year old male  with PMH of morbid obesity, BEA, pAFIB (not on AC), HTN, and BL papilledema attributed to pseudotumor cerebri; who initially presented to  on 2/24 with SOB and BL LE edema. Found to have URI with progressive SOB and multifocal PNA on imaging. His hospital course was complicated by worsening respiratory distress and increased O2 demands secondary to secretions (requiring multiple intubation attempts) and eventual MICU admission. While in MICU, his O2 requirements continued despite optimal vent management. Concern noted for progressive ARDS, unable to prone given morbid obesity, and ultimately cannulated for vvECMO on 2/25 and transferred to [facility] for further management on 2/26. His hospital course at [facility] was significant for the following: diuretic and ABX management, s/p trach and PEG (placed on 3/7- PEG since removed), RCU admission, high grade fevers (secondary to panniculitis), progressively worsening sacral ulcer (likely osteomyelitis- s/p surgical debridement), BL foot wound (secondary to pressor use), neuropathy (secondary to critical illness polyneuropathy), ELLA (secondary to vancomycin toxicity and overdiuresis- since DCd- with improvement). Patient now admitted for a multidisciplinary rehab program. 04-25-25     #Left hip pain - Improving  -Hip xray--> no fracture  -CT hip: Again seen is a sacral decubitus wound which extends superficial as well as involving the right gluteus musculature and the posterior L5 with small amount of fluid and gas within the tract. Limited involvement on this noncontrast CT. This likely corresponds to adjacent phlegmonous changes  -MRI left hip: Incompletely characterized sacral wound with associated ill-defined collection of fluid and soft tissue gas which extends to/involve the right gluteus zurdo muscle as at CT pelvis study from one day prior. No interval change in the high STIR signal within the bilateral gluteal and thigh musculature suggestive of a myositis as compared to prior MRI study. No fracture, osseous destruction or aggressive periosteal reaction. No osteomyelitis at the left hip. Edema-like signal along the right and left greater trochanteric regions, overall increased as compared to the prior MRI study from 4/11/2025. Findings are felt to be reactive in etiology.  -Continue comprehensive rehab program - PT/OT/SLP per rehab team  -Pain management, bowel regimen per rehab     #Abnormal CT A/P  -5/2: CT A/P: Question of pneumatosis in the cecum with no other evidence of bowel ischemia. Correlation with lactate levels and clinical exam would be helpful.  -Patient has no symptoms. Abdomen is benign. Tolerating PO  -5/2: Surgery cx noted: no intervention. c/w PO. refer to bariatric surgery post-DC dr botello at [place] or Dr. Segovia at [place] [per patient request]  -5/3 GI cx noted: no GI intervention planned. c/w PO    #Stage IV Sacral Decub Ulcer  #Bilateral Buttocks Wounds  #Bilateral Foot Wounds  -5/2 CT A/P: An air and fluid containing collection in the right gluteus zurdo muscle in continuity with a subcutaneous sacral collection. No obvious skin defect to suggest a decubitus ulcer.  -5/7: MRI left hip: Incompletely characterized sacral wound with associated ill-defined collection of fluid and soft tissue gas which extends to/involve the right gluteus zurdo muscle as at CT pelvis study from one day prior. No interval change in the high STIR signal within the bilateral gluteal and thigh musculature suggestive of a myositis as compared to prior MRI study. No fracture, osseous destruction or aggressive periosteal reaction. No osteomyelitis at the left hip. Edema-like signal along the right and left greater trochanteric regions, overall increased as compared to the prior MRI study from 4/11/2025. Findings are felt to be reactive in etiology..  -Continue local wound care  -MVI, vitamin C and zinc   -Off load pressure  -Plastic consult following  -ID follow up noted and appreciated  -Per plastics - continue current management    #Fungemia 2/2 Panniculitis - resolved  -Blood culture 3/15, 3/16 with candida albicans, s/p course of antifungals   -Repeat cultures negative since 3/18    #Debility Secondary to Acute Hypoxic Respiratory Failure/ARDS 2/2 PNA  #BEA (Not on CPAP)  #Critical Illness Polyneuropathy   -s/p VV ECMO, s/p diuresis, TTE/ROBERT with RV failure, s/p treatment for pneumonia  -Repeat Echo 3/11 showed EF 66 %. RV is not well visualized, enlarged RV cavity size a/ reduced RV systolic function. No significant valvular disease.  No echocardiographic evidence of pulmonary hypertension  -s/p Trach (decannulated 3/28) and PEG (removed 4/15)   -Saturating well on RA. Continue duoneb prn  -BIPAP qhs (FiO2 40%, 18/10) via nasal mask   -Fall precaution    #p-AFIB  #Sinus Tachycardia (Resolved)  -Patient has intermittent tachycardia. Per patient, His HR  mostly above 110 even when he is not sick. follows with cardio OP  -5/3: EKG: NSR@94 bpm  -Eliquis   -Off norvasc  -Continue metoprolol tartarate 50mg BID  -TSH WNL 2/25  -Sinus tachycardia likely multifactorial including pain and deconditioning. Low suspicion for PE, no SOB/hypoxia and he is already on full AC    #RIJ and Bilateral LE DVT  -Duplex RUE 3/14 showed Acute, non-occlusive deep vein thrombosis noted within the right internal jugular vein. Superficial vein thrombosis noted within the right antecubital cephalic vein  -Duplex LE 3/14 showed Acute, occlusive deep vein thromboses noted within the right and left peroneal veins at both mid calves. Age-indeterminate, occlusive deep vein thrombosis visualized within the left gastrocnemius vein at the proximal calf.  -Continue eliquis  -Repeat duplex 4/29 showed No evidence of deep venous thrombosis in either lower extremity.    #Normocytic Anemia - Likely multifactorial  -H/H stable, no evidence of active bleed   -Monitor CBC    #HTN  -Amlodipine d/melly  -Continue Metoprolol tartrate 50mg BID   -Monitor BP     #History of Pseudotumor Cerebri   -Outpatient follow up     #ELLA - resolved  -Baseline Cr appears to be ~0.8 range   -ELLA felt to be multifactorial in setting of cardiorenal w/ RVE, recurrent ELLA suspected due to vancomycin toxicity and diuresis   -Continue to encourage oral hydration   -Renvela   -Low phos diet  -Monitor BMP and phos level     #Type 2 Diabetes, HbA1C 4.9 (4/28)  -FS wnl. Monitor glucose on routine lab, controlled     #Urinary Retention  -Flomax 5/1  -Renal u/s 5/20 unremarkable    #Mood  -Continue Seroquel    #DVT ppx - Eliquis  #GI ppx - PPI    Discussed with rehab team

## 2025-06-04 NOTE — PROGRESS NOTE ADULT - SUBJECTIVE AND OBJECTIVE BOX
Subjective  Patient seen and examined this AM with therapy.   Girlfriend present during encounter.  Admits to sleeping well.   Noted with increased therapy tolerance and engagement.  Sacral pain reducing with donut cushion, sacral wound no active bleeding   Discussed orthotic eval for AFO, and independent WC propel in bedroom and hallway.     ROS--no acute pain, no fever or chest pain   B/l leg and sacral ulcers,  LBM 6/4    Function --Significant functional improvement  Ambulated 35' x 2 trials with bariatric RW needing MIN A x 1 and wheelchair  follow. Continues to use modified step to 3 point gait pattern (leading with  Right LE) and excessive offloading with UEs on device due to bilateral foot  pain. [employer] shoes used today.  -Emphasized right foot placement with DAR[employer] shoes secondary to worsening foot  drop with foot ware.    Vital Signs Last 24 Hrs  T(C): 36.7 (04 Jun 2025 07:47), Max: 36.7 (04 Jun 2025 07:47)  T(F): 98 (04 Jun 2025 07:47), Max: 98 (04 Jun 2025 07:47)  HR: 72 (04 Jun 2025 07:47) (72 - 114)  BP: 104/70 (04 Jun 2025 07:47) (104/70 - 124/75)  BP(mean): --  RR: 16 (04 Jun 2025 07:47) (16 - 16)  SpO2: 94% (04 Jun 2025 07:47) (94% - 97%)    EXAM  Gen - Comfortable, cheerful  HEENT - NAD  Neck - Supple, No limited ROM  Pulm - Clear  Cardiovascular - RRR, S1S2, No murmurs  Abdomen - Soft, non tender, +BS  Extremities - chronic skin changes with hyperpigmentation at shin, callus on feet    Neuro-  AAO X 3, Speech is normal     Motor - UE 5/5 Hip flexion 3/5, Knee 4/5 PF 4/5, DF 2/5     Sensory - Decreased to light touch bilateral lower extremities      Coordination - impaired lower extremities   Psychiatric - Mood stable, Affect WNL    Skin:  R foot plantar aspect lateral/distal side 7cm x 5.5cm ischemic wound from pressors  RLE 4th digit 1x1.5cm ischemic induced wound from pressors  RLE 5th digit 3x2cm ischemic induced wound from pressors  L foot plantar aspect lateral/distal side 6x7cm ischemic induced wound from pressor usage  LLE 4th digit 2x1cm ischemic induced wound from pressors  LLE 5th digit 2.5x3cm ischemic induced wound from pressors   R groin skin tear 1x1.5cm  Unstageable pressure wound cocyx 7cmx4.5cm w/ 2.5cm depth  R buttock pressure induced wound semi contiguous with coccyx wound calling unstageable given prior debridement 8.5x5.5cm  L buttock 1.0x0.5cm stage 3 pressure injury  R buttock skin tear 3.5cm x 0.5cm and 1.5cmx0.5cm  Dry skin with callus on sole of foot     MMS--antigravity upper extremities,        RECENT LABS/IMAGING                        9.1    7.02  )-----------( 503      ( 02 Jun 2025 07:10 )             31.2     06-02    144  |  106  |  11  ----------------------------<  101[H]  3.5   |  31  |  1.14    Ca    9.2      02 Jun 2025 07:10    TPro  7.2  /  Alb  2.0[L]  /  TBili  0.3  /  DBili  x   /  AST  21  /  ALT  16  /  AlkPhos  47  06-02      Urinalysis Basic - ( 02 Jun 2025 07:10 )    Color: x / Appearance: x / SG: x / pH: x  Gluc: 101 mg/dL / Ketone: x  / Bili: x / Urobili: x   Blood: x / Protein: x / Nitrite: x   Leuk Esterase: x / RBC: x / WBC x   Sq Epi: x / Non Sq Epi: x / Bacteria: x        MEDICATIONS  (STANDING):  ammonium lactate 12% Lotion 1 Application(s) Topical two times a day  apixaban 5 milliGRAM(s) Oral two times a day  ascorbic acid 500 milliGRAM(s) Oral daily  bisacodyl 10 milliGRAM(s) Oral at bedtime  capsaicin 0.025% Cream 1 Application(s) Topical four times a day  celecoxib 100 milliGRAM(s) Oral two times a day  clotrimazole 1% Cream 1 Application(s) Topical <User Schedule>  collagenase Ointment 1 Application(s) Topical two times a day  Dakins Solution - 1/4 Strength 1 Application(s) Topical two times a day  dextrose 5%. 1000 milliLiter(s) (100 mL/Hr) IV Continuous <Continuous>  dextrose 5%. 1000 milliLiter(s) (50 mL/Hr) IV Continuous <Continuous>  dextrose 50% Injectable 25 Gram(s) IV Push once  dextrose 50% Injectable 12.5 Gram(s) IV Push once  dextrose 50% Injectable 25 Gram(s) IV Push once  gabapentin 1200 milliGRAM(s) Oral every 8 hours  glucagon  Injectable 1 milliGRAM(s) IntraMuscular once  influenza   Vaccine 0.5 milliLiter(s) IntraMuscular once  lidocaine   4% Patch 1 Patch Transdermal daily  lidocaine   4% Patch 1 Patch Transdermal daily  magnesium oxide 400 milliGRAM(s) Oral three times a day with meals  melatonin 9 milliGRAM(s) Oral at bedtime  metoprolol tartrate 50 milliGRAM(s) Oral two times a day  multivitamin 1 Tablet(s) Oral daily  mupirocin 2% Ointment 1 Application(s) Topical two times a day  naloxegol 25 milliGRAM(s) Oral daily  pantoprazole    Tablet 40 milliGRAM(s) Oral before breakfast  petrolatum white Ointment 1 Application(s) Topical every 8 hours  polyethylene glycol 3350 17 Gram(s) Oral two times a day  povidone iodine 10% Solution 1 Application(s) Topical daily  QUEtiapine 25 milliGRAM(s) Oral at bedtime  sevelamer carbonate 800 milliGRAM(s) Oral three times a day with meals  silver nitrate Applicator 6 Application(s) Topical once  tamsulosin 0.8 milliGRAM(s) Oral at bedtime  zinc sulfate 220 milliGRAM(s) Oral daily    MEDICATIONS  (PRN):  albuterol/ipratropium for Nebulization 3 milliLiter(s) Nebulizer every 6 hours PRN Shortness of Breath and/or Wheezing  dextrose Oral Gel 15 Gram(s) Oral once PRN Blood Glucose LESS THAN 70 milliGRAM(s)/deciliter  oxyCODONE    IR 15 milliGRAM(s) Oral two times a day PRN Moderate Pain (4 - 6)  oxyCODONE    IR 10 milliGRAM(s) Oral two times a day PRN Mild Pain (1 - 3)  oxyCODONE    IR 20 milliGRAM(s) Oral every 6 hours PRN Severe Pain (7 - 10)     Subjective  Patient seen and examined this AM with therapy.   Girlfriend present during encounter.  Admits to sleeping well. Improved ROM and markedly reduced pain symptoms  Sacral pain reducing with donut cushion, sacral wound no active bleeding   Discussed orthotic eval for AFO, and independent WC propel in bedroom and hallway.     ROS--no acute pain, no fever or chest pain   B/l leg and sacral ulcers,  LBM 6/4    Function --Further functional improvement  -Ambulated 75' x 3 trials with bariatric RW needing CG/MIN A x 1 and wheelchair  follow. Progressed to reciprocal gait pattern utilizing less energy expenditure  with walking. Steppage gait pattern used today while wearing sneakers due to  weak ankle DF/foot drop.      Vital Signs Last 24 Hrs  T(C): 36.7 (04 Jun 2025 07:47), Max: 36.7 (04 Jun 2025 07:47)  T(F): 98 (04 Jun 2025 07:47), Max: 98 (04 Jun 2025 07:47)  HR: 72 (04 Jun 2025 07:47) (72 - 114)  BP: 104/70 (04 Jun 2025 07:47) (104/70 - 124/75)  BP(mean): --  RR: 16 (04 Jun 2025 07:47) (16 - 16)  SpO2: 94% (04 Jun 2025 07:47) (94% - 97%)    EXAM  Gen - Comfortable, cheerful, engaging   HEENT - NAD  Neck - Supple, No limited ROM  Pulm - Clear  Cardiovascular - RRR, S1S2, No murmurs  Abdomen - Soft, non tender, +BS  Extremities - chronic skin changes with hyperpigmentation at shin, callus on feet    Neuro-  AAO X 3, Speech is normal     Motor - UE 5/5 Hip flexion 3/5, Knee 4/5 PF 4/5, DF 2/5     Sensory - Decreased to light touch bilateral lower extremities      Coordination - impaired lower extremities   Psychiatric - Mood stable, Affect WNL    Skin:  R foot plantar aspect lateral/distal side 7cm x 5.5cm ischemic wound from pressors  RLE 4th digit 1x1.5cm ischemic induced wound from pressors  RLE 5th digit 3x2cm ischemic induced wound from pressors  L foot plantar aspect lateral/distal side 6x7cm ischemic induced wound from pressor usage  LLE 4th digit 2x1cm ischemic induced wound from pressors  LLE 5th digit 2.5x3cm ischemic induced wound from pressors   R groin skin tear 1x1.5cm  Unstageable pressure wound cocyx 7cmx4.5cm w/ 2.5cm depth  R buttock pressure induced wound semi contiguous with coccyx wound calling unstageable given prior debridement 8.5x5.5cm  L buttock 1.0x0.5cm stage 3 pressure injury  R buttock skin tear 3.5cm x 0.5cm and 1.5cmx0.5cm  Dry skin with callus on sole of foot     MMS--antigravity upper extremities,        RECENT LABS/IMAGING                        9.1    7.02  )-----------( 503      ( 02 Jun 2025 07:10 )             31.2     06-02    144  |  106  |  11  ----------------------------<  101[H]  3.5   |  31  |  1.14    Ca    9.2      02 Jun 2025 07:10    TPro  7.2  /  Alb  2.0[L]  /  TBili  0.3  /  DBili  x   /  AST  21  /  ALT  16  /  AlkPhos  47  06-02      Urinalysis Basic - ( 02 Jun 2025 07:10 )    Color: x / Appearance: x / SG: x / pH: x  Gluc: 101 mg/dL / Ketone: x  / Bili: x / Urobili: x   Blood: x / Protein: x / Nitrite: x   Leuk Esterase: x / RBC: x / WBC x   Sq Epi: x / Non Sq Epi: x / Bacteria: x        MEDICATIONS  (STANDING):  ammonium lactate 12% Lotion 1 Application(s) Topical two times a day  apixaban 5 milliGRAM(s) Oral two times a day  ascorbic acid 500 milliGRAM(s) Oral daily  bisacodyl 10 milliGRAM(s) Oral at bedtime  capsaicin 0.025% Cream 1 Application(s) Topical four times a day  celecoxib 100 milliGRAM(s) Oral two times a day  clotrimazole 1% Cream 1 Application(s) Topical <User Schedule>  collagenase Ointment 1 Application(s) Topical two times a day  Dakins Solution - 1/4 Strength 1 Application(s) Topical two times a day  dextrose 5%. 1000 milliLiter(s) (100 mL/Hr) IV Continuous <Continuous>  dextrose 5%. 1000 milliLiter(s) (50 mL/Hr) IV Continuous <Continuous>  dextrose 50% Injectable 25 Gram(s) IV Push once  dextrose 50% Injectable 12.5 Gram(s) IV Push once  dextrose 50% Injectable 25 Gram(s) IV Push once  gabapentin 1200 milliGRAM(s) Oral every 8 hours  glucagon  Injectable 1 milliGRAM(s) IntraMuscular once  influenza   Vaccine 0.5 milliLiter(s) IntraMuscular once  lidocaine   4% Patch 1 Patch Transdermal daily  lidocaine   4% Patch 1 Patch Transdermal daily  magnesium oxide 400 milliGRAM(s) Oral three times a day with meals  melatonin 9 milliGRAM(s) Oral at bedtime  metoprolol tartrate 50 milliGRAM(s) Oral two times a day  multivitamin 1 Tablet(s) Oral daily  mupirocin 2% Ointment 1 Application(s) Topical two times a day  naloxegol 25 milliGRAM(s) Oral daily  pantoprazole    Tablet 40 milliGRAM(s) Oral before breakfast  petrolatum white Ointment 1 Application(s) Topical every 8 hours  polyethylene glycol 3350 17 Gram(s) Oral two times a day  povidone iodine 10% Solution 1 Application(s) Topical daily  QUEtiapine 25 milliGRAM(s) Oral at bedtime  sevelamer carbonate 800 milliGRAM(s) Oral three times a day with meals  silver nitrate Applicator 6 Application(s) Topical once  tamsulosin 0.8 milliGRAM(s) Oral at bedtime  zinc sulfate 220 milliGRAM(s) Oral daily    MEDICATIONS  (PRN):  albuterol/ipratropium for Nebulization 3 milliLiter(s) Nebulizer every 6 hours PRN Shortness of Breath and/or Wheezing  dextrose Oral Gel 15 Gram(s) Oral once PRN Blood Glucose LESS THAN 70 milliGRAM(s)/deciliter  oxyCODONE    IR 15 milliGRAM(s) Oral two times a day PRN Moderate Pain (4 - 6)  oxyCODONE    IR 10 milliGRAM(s) Oral two times a day PRN Mild Pain (1 - 3)  oxyCODONE    IR 20 milliGRAM(s) Oral every 6 hours PRN Severe Pain (7 - 10)

## 2025-06-04 NOTE — PROGRESS NOTE ADULT - SUBJECTIVE AND OBJECTIVE BOX
Interval History  Patient seen and examined at bedside. No acute events noted.  Patient endorses LE neuropathic pain improved on current regimen.       ALLERGIES:  No Known Allergies    MEDICATIONS  (STANDING):  ammonium lactate 12% Lotion 1 Application(s) Topical two times a day  apixaban 5 milliGRAM(s) Oral two times a day  ascorbic acid 500 milliGRAM(s) Oral daily  bisacodyl 10 milliGRAM(s) Oral at bedtime  capsaicin 0.025% Cream 1 Application(s) Topical four times a day  celecoxib 100 milliGRAM(s) Oral two times a day  clotrimazole 1% Cream 1 Application(s) Topical <User Schedule>  collagenase Ointment 1 Application(s) Topical two times a day  Dakins Solution - 1/4 Strength 1 Application(s) Topical two times a day  dextrose 5%. 1000 milliLiter(s) (100 mL/Hr) IV Continuous <Continuous>  dextrose 5%. 1000 milliLiter(s) (50 mL/Hr) IV Continuous <Continuous>  dextrose 50% Injectable 25 Gram(s) IV Push once  dextrose 50% Injectable 12.5 Gram(s) IV Push once  dextrose 50% Injectable 25 Gram(s) IV Push once  gabapentin 1200 milliGRAM(s) Oral every 8 hours  glucagon  Injectable 1 milliGRAM(s) IntraMuscular once  influenza   Vaccine 0.5 milliLiter(s) IntraMuscular once  lidocaine   4% Patch 1 Patch Transdermal daily  lidocaine   4% Patch 1 Patch Transdermal daily  magnesium oxide 400 milliGRAM(s) Oral three times a day with meals  melatonin 9 milliGRAM(s) Oral at bedtime  metoprolol tartrate 50 milliGRAM(s) Oral two times a day  multivitamin 1 Tablet(s) Oral daily  mupirocin 2% Ointment 1 Application(s) Topical two times a day  naloxegol 25 milliGRAM(s) Oral daily  pantoprazole    Tablet 40 milliGRAM(s) Oral before breakfast  petrolatum white Ointment 1 Application(s) Topical every 8 hours  polyethylene glycol 3350 17 Gram(s) Oral two times a day  povidone iodine 10% Solution 1 Application(s) Topical daily  QUEtiapine 25 milliGRAM(s) Oral at bedtime  sevelamer carbonate 800 milliGRAM(s) Oral three times a day with meals  silver nitrate Applicator 6 Application(s) Topical once  tamsulosin 0.8 milliGRAM(s) Oral at bedtime  zinc sulfate 220 milliGRAM(s) Oral daily    MEDICATIONS  (PRN):  albuterol/ipratropium for Nebulization 3 milliLiter(s) Nebulizer every 6 hours PRN Shortness of Breath and/or Wheezing  dextrose Oral Gel 15 Gram(s) Oral once PRN Blood Glucose LESS THAN 70 milliGRAM(s)/deciliter  oxyCODONE    IR 15 milliGRAM(s) Oral two times a day PRN Moderate Pain (4 - 6)  oxyCODONE    IR 10 milliGRAM(s) Oral two times a day PRN Mild Pain (1 - 3)  oxyCODONE    IR 20 milliGRAM(s) Oral every 6 hours PRN Severe Pain (7 - 10)    Vital Signs Last 24 Hrs  T(F): 98 (04 Jun 2025 07:47), Max: 98 (04 Jun 2025 07:47)  HR: 72 (04 Jun 2025 07:47) (72 - 114)  BP: 104/70 (04 Jun 2025 07:47) (104/70 - 124/75)  RR: 16 (04 Jun 2025 07:47) (16 - 16)  SpO2: 94% (04 Jun 2025 07:47) (94% - 97%)  I&O's Summary        PHYSICAL EXAM:  GENERAL: NAD  HEENT: NCAT  CHEST/LUNG: Clear to percussion bilaterally; No rales, rhonchi, wheezing  HEART: Regular rate and rhythm; No murmurs  ABDOMEN: Soft, Nontender, Nondistended; Bowel sounds present  MUSCULOSKELETAL/EXTREMITIES:  2+ Peripheral Pulses, No LE edema  PSYCH: Appropriate affect  NEURO: Alert & Oriented    I personally reviewed the below data/images/labs:    LABS:                        9.1    7.02  )-----------( 503      ( 02 Jun 2025 07:10 )             31.2       06-02    144  |  106  |  11  ----------------------------<  101  3.5   |  31  |  1.14    Ca    9.2      02 Jun 2025 07:10    TPro  7.2  /  Alb  2.0  /  TBili  0.3  /  DBili  x   /  AST  21  /  ALT  16  /  AlkPhos  47  06-02                03-08 Chol -- LDL -- HDL -- Trig 169 mg/dL                  Urinalysis Basic - ( 02 Jun 2025 07:10 )    Color: x / Appearance: x / SG: x / pH: x  Gluc: 101 mg/dL / Ketone: x  / Bili: x / Urobili: x   Blood: x / Protein: x / Nitrite: x   Leuk Esterase: x / RBC: x / WBC x   Sq Epi: x / Non Sq Epi: x / Bacteria: x            Consultant(s) Notes Reviewed:   Care Discused with Consultants/Other Providers:  Imaging Personally Reviewed:        Interval History  Patient seen and examined at bedside. No acute events noted.  Patient endorses LE neuropathic pain improved on current regimen.     ALLERGIES:  No Known Allergies    MEDICATIONS  (STANDING):  ammonium lactate 12% Lotion 1 Application(s) Topical two times a day  apixaban 5 milliGRAM(s) Oral two times a day  ascorbic acid 500 milliGRAM(s) Oral daily  bisacodyl 10 milliGRAM(s) Oral at bedtime  capsaicin 0.025% Cream 1 Application(s) Topical four times a day  celecoxib 100 milliGRAM(s) Oral two times a day  clotrimazole 1% Cream 1 Application(s) Topical <User Schedule>  collagenase Ointment 1 Application(s) Topical two times a day  Dakins Solution - 1/4 Strength 1 Application(s) Topical two times a day  dextrose 5%. 1000 milliLiter(s) (100 mL/Hr) IV Continuous <Continuous>  dextrose 5%. 1000 milliLiter(s) (50 mL/Hr) IV Continuous <Continuous>  dextrose 50% Injectable 25 Gram(s) IV Push once  dextrose 50% Injectable 12.5 Gram(s) IV Push once  dextrose 50% Injectable 25 Gram(s) IV Push once  gabapentin 1200 milliGRAM(s) Oral every 8 hours  glucagon  Injectable 1 milliGRAM(s) IntraMuscular once  influenza   Vaccine 0.5 milliLiter(s) IntraMuscular once  lidocaine   4% Patch 1 Patch Transdermal daily  lidocaine   4% Patch 1 Patch Transdermal daily  magnesium oxide 400 milliGRAM(s) Oral three times a day with meals  melatonin 9 milliGRAM(s) Oral at bedtime  metoprolol tartrate 50 milliGRAM(s) Oral two times a day  multivitamin 1 Tablet(s) Oral daily  mupirocin 2% Ointment 1 Application(s) Topical two times a day  naloxegol 25 milliGRAM(s) Oral daily  pantoprazole    Tablet 40 milliGRAM(s) Oral before breakfast  petrolatum white Ointment 1 Application(s) Topical every 8 hours  polyethylene glycol 3350 17 Gram(s) Oral two times a day  povidone iodine 10% Solution 1 Application(s) Topical daily  QUEtiapine 25 milliGRAM(s) Oral at bedtime  sevelamer carbonate 800 milliGRAM(s) Oral three times a day with meals  silver nitrate Applicator 6 Application(s) Topical once  tamsulosin 0.8 milliGRAM(s) Oral at bedtime  zinc sulfate 220 milliGRAM(s) Oral daily    MEDICATIONS  (PRN):  albuterol/ipratropium for Nebulization 3 milliLiter(s) Nebulizer every 6 hours PRN Shortness of Breath and/or Wheezing  dextrose Oral Gel 15 Gram(s) Oral once PRN Blood Glucose LESS THAN 70 milliGRAM(s)/deciliter  oxyCODONE    IR 15 milliGRAM(s) Oral two times a day PRN Moderate Pain (4 - 6)  oxyCODONE    IR 10 milliGRAM(s) Oral two times a day PRN Mild Pain (1 - 3)  oxyCODONE    IR 20 milliGRAM(s) Oral every 6 hours PRN Severe Pain (7 - 10)    Vital Signs Last 24 Hrs  T(F): 98 (04 Jun 2025 07:47), Max: 98 (04 Jun 2025 07:47)  HR: 72 (04 Jun 2025 07:47) (72 - 114)  BP: 104/70 (04 Jun 2025 07:47) (104/70 - 124/75)  RR: 16 (04 Jun 2025 07:47) (16 - 16)  SpO2: 94% (04 Jun 2025 07:47) (94% - 97%)  I&O's Summary    PHYSICAL EXAM:  GENERAL: NAD  HEENT: NCAT  CHEST/LUNG: Clear to percussion bilaterally; No rales, rhonchi, wheezing  HEART: Regular rate and rhythm  ABDOMEN: Soft, Nontender, Nondistended; Bowel sounds present  MUSCULOSKELETAL/EXTREMITIES: No edema   PSYCH: Appropriate affect  NEURO: Alert & Oriented x 3    I personally reviewed the below data/images/labs:    LABS:                        9.1    7.02  )-----------( 503      ( 02 Jun 2025 07:10 )             31.2       06-02    144  |  106  |  11  ----------------------------<  101  3.5   |  31  |  1.14    Ca    9.2      02 Jun 2025 07:10    TPro  7.2  /  Alb  2.0  /  TBili  0.3  /  DBili  x   /  AST  21  /  ALT  16  /  AlkPhos  47  06-02      03-08 Chol -- LDL -- HDL -- Trig 169 mg/dL    Urinalysis Basic - ( 02 Jun 2025 07:10 )    Color: x / Appearance: x / SG: x / pH: x  Gluc: 101 mg/dL / Ketone: x  / Bili: x / Urobili: x   Blood: x / Protein: x / Nitrite: x   Leuk Esterase: x / RBC: x / WBC x   Sq Epi: x / Non Sq Epi: x / Bacteria: x            Consultant(s) Notes Reviewed:   Care Discused with Consultants/Other Providers:  Imaging Personally Reviewed:

## 2025-06-04 NOTE — PROGRESS NOTE ADULT - ASSESSMENT
39 year old male with PMH of morbid obesity, BEA on CPAP, pAFIB (not on AC prior to admission), HTN, and BL papilledema attributed to pseudotumor cerebri; who initially presented to  on 2/24 with SOB and BL LE edema. Found to have URI with progressive SOB and multifocal PNA on imaging. His hospital course was complicated by worsening respiratory distress and increased 02 demands secondary to secretions (requiring multiple intubation attempts) and eventual MICU admission. While in MICU, he 02 requirements continued despite optimal vent management. Concern noted for progressive ARDS, unable to prone given morbid obesity, and ultimately cannulated for vvECMO on 2/25 and transferred to [facility] for further management on 2/26.  His hospital course at [facility] was significant for the following: diuretic and ABX management, s/p trache and PEG (placed on 3/7- PEG since removed), RCU admission, high grade fevers (secondary to panniculitis), progressively worsening sacral ulcer (likely osteomyelitis- s/p surgical debridement), BL foot wound (secondary to pressor use), neuropathy (secondary to critical illness polyneuropathy), ELLA (secondary to vancomycin toxicity and overdiuresis- since DCd- with improvement). Patient now admitted for a multidisciplinary rehab program. 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    - Gait Instability, ADL impairments and Functional impairments: start Comprehensive Rehab Program of PT/OT  - 3 hours a day, 5 days a week   - PRN: Nebulizer QID     #DF weakness with foot clearance   - await orthotic eval for BL AFOs    #BEA on BIPAP  --via nasal canula qhs    #Sacral Osteomyelitis  - Zosyn TID - completed on 5/12/25    #Paroxysmal AFIB  - Eliquis 5mg BID     #Calf Tenderness/pain b/l feet  - BL LE doppler negative 5/21   --Apply offloading boots when OOB      #L hip pain - resolved  - no acute findings on XR or CT   - L hip MRI (5/7) no significant findings     #HTN  - Amlodipine 10mg dialy     #Sleep/Mood  - Melatonin 9mg at HS   - Quetiapine 25mg at HS     #Skin  Wound Recs per Plastic Surgery (5/16):  Sacral Pressure Injury: BID- Cleanse with NS, remove excess fluid, apply santyl ointment to sloughing tissues, pack wound with 1-inch dry plain packing, cover with 4 inch gauze and abd combine  - R buttock: BID- Cleanse with NS, overlay calcium alginate, ABD combine dressing  Additional wound care instructions:  -->Right anterior thigh to groin isolated wound: Clean wound and periwound skin with NS. Pat dry. Apply liquid barrier film to periwound skin, allow to dry. Cover with silicone foam with border. Change daily or PRN if soiled   --> Bilateral abdominal pannus, bilateral groin: Cleanse with luke-warm soap and water, dry well. Apply clotrimazole  --> Bilateral feet: Apply betadine  to bilateral foot wounds followed by 4x4 gauze, ABD pad, and matilde daily per podiatry.  --> Continue to offload pressure; bariatric low airloss support surface, turn and position per protocol with use of fluidized positioning devices, continue incontinence and moisture care per protocol and use of single breathable incontinence pads, continue to offload heels with fluidized positioning devices. Continue use of bariatric seat cushion, limit sit time- no more than 2 consecutive hours at any given time.  - Pressure injury/Skin: OOB to Chair, PT/OT    - Ammonium lactate to BL LEs     #Neuropathy/sacral ulcer  - Gabapentin 1200mg TID --aim to taper to lower dose    #Pain Mgmt   - Capsaicin cream to BL feet QID - Avoid use on damaged, broken, or irritated skin. Avoid occlusive dressing or heat   - Lidocaine patch to BL thighs   - PRN:; Oxycodone and Tylenol 650mg QID   -celebrex 100mg bid for sacral pain   - neuropathic pain b/l feet c/w   gabapentin    #Morbid obesity  - Most recent weight 394lbs (5/7)     #GI/Bowel Mgmt   - Pantoprazole  - Bisacodyl 10mg at HS   - Miralax   - Naloxegal 25mg daily     #/Bladder Mgmt   #ELLA  - USKB 5/20 negative   - Renvela 800mg TID    #FEN --Morbid obesity (BMI > 40).  - Diet - Regular + Thins  [CCHO]    - MVI     # Health Management:   Environmental challenge affecting dc plan--narrow door and need for ramp to house entrance  However, patient making goal directed effort towards therapy goals     #Precautions / PROPHYLAXIS:   - Falls  - ortho: Weight bearing status: WBAT in surgical shoe   - Lungs: Aspiration, Incentive Spirometer   - DVT: Lovenox  ----------------------------------------------      Function as at 6/2  Ambulating 35 ft  with RW wc follow through min assist (marked improvement)  Stand pivot transfers, bed to wc  Barrier--ulcers on feet, limiting wt bearing,  environmental barrier at home (stairs), severe obesity  Est dc revised to 6/13, issues with insurance, not approving FLORENCE      ----------------------------------------------  Dr. CANTU's Liaison with Family/Providers:  6/1- Met Patient's father during his visit to the unit during PM rounds, discssed review today, improved function, good pain control, appropriate use of offloading shoes  He is happy with this. Significant functional progress over last few days noted   The main challenge in DF strength, we will work on using ace wraps, and will get orthotics review for AFO if this persists    ----------------------------------------------  OUTPATIENT/FOLLOW UP:    Your Primary Care Provider,   Phone: (   )    -  Fax: (   )    -  Follow Up Time: 1 week    [facility] [place],   [address] [place] [address]  Phone: [phone]  Fax: (   )    -  Follow Up Time:    Dr. Waterhouse  Podiatry  [phone]  Within 1 week         39 year old male with PMH of morbid obesity, BEA on CPAP, pAFIB (not on AC prior to admission), HTN, and BL papilledema attributed to pseudotumor cerebri; who initially presented to  on 2/24 with SOB and BL LE edema. Found to have URI with progressive SOB and multifocal PNA on imaging. His hospital course was complicated by worsening respiratory distress and increased 02 demands secondary to secretions (requiring multiple intubation attempts) and eventual MICU admission. While in MICU, he 02 requirements continued despite optimal vent management. Concern noted for progressive ARDS, unable to prone given morbid obesity, and ultimately cannulated for vvECMO on 2/25 and transferred to [facility] for further management on 2/26.  His hospital course at [facility] was significant for the following: diuretic and ABX management, s/p trache and PEG (placed on 3/7- PEG since removed), RCU admission, high grade fevers (secondary to panniculitis), progressively worsening sacral ulcer (likely osteomyelitis- s/p surgical debridement), BL foot wound (secondary to pressor use), neuropathy (secondary to critical illness polyneuropathy), ELLA (secondary to vancomycin toxicity and overdiuresis- since DCd- with improvement). Patient now admitted for a multidisciplinary rehab program. 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    - Gait Instability, ADL impairments and Functional impairments: start Comprehensive Rehab Program of PT/OT  - 3 hours a day, 5 days a week   - PRN: Nebulizer QID     #  Persisting/significant DF weakness - await orthotic eval for BL AFOs  - await orthotic eval for BL AFOs    #BEA on BIPAP  --via nasal canula qhs    #Sacral Osteomyelitis  - Zosyn TID - completed on 5/12/25    #Paroxysmal AFIB  - Eliquis 5mg BID     #Calf Tenderness/pain b/l feet  - BL LE doppler negative 5/21   --Apply offloading boots when OOB      #L hip pain - resolved  - no acute findings on XR or CT   - L hip MRI (5/7) no significant findings     #HTN  - Amlodipine 10mg dialy     #Sleep/Mood  - Melatonin 9mg at HS   - Quetiapine 25mg at HS     #Skin  Wound Recs per Plastic Surgery (5/16):  Sacral Pressure Injury: BID- Cleanse with NS, remove excess fluid, apply santyl ointment to sloughing tissues, pack wound with 1-inch dry plain packing, cover with 4 inch gauze and abd combine  - R buttock: BID- Cleanse with NS, overlay calcium alginate, ABD combine dressing  Additional wound care instructions:  -->Right anterior thigh to groin isolated wound: Clean wound and periwound skin with NS. Pat dry. Apply liquid barrier film to periwound skin, allow to dry. Cover with silicone foam with border. Change daily or PRN if soiled   --> Bilateral abdominal pannus, bilateral groin: Cleanse with luke-warm soap and water, dry well. Apply clotrimazole  --> Bilateral feet: Apply betadine  to bilateral foot wounds followed by 4x4 gauze, ABD pad, and matilde daily per podiatry.  --> Continue to offload pressure; bariatric low airloss support surface, turn and position per protocol with use of fluidized positioning devices, continue incontinence and moisture care per protocol and use of single breathable incontinence pads, continue to offload heels with fluidized positioning devices. Continue use of bariatric seat cushion, limit sit time- no more than 2 consecutive hours at any given time.  - Pressure injury/Skin: OOB to Chair, PT/OT    - Ammonium lactate to BL LEs     #Neuropathy/sacral ulcer  - Gabapentin 1200mg TID --aim to taper to lower dose    #Pain Mgmt   - Capsaicin cream to BL feet QID - Avoid use on damaged, broken, or irritated skin. Avoid occlusive dressing or heat   - Lidocaine patch to BL thighs   - PRN:; Oxycodone and Tylenol 650mg QID   -celebrex 100mg bid for sacral pain   - neuropathic pain b/l feet c/w   gabapentin    #Morbid obesity  - Most recent weight 394lbs (5/7)     #GI/Bowel Mgmt   - Pantoprazole  - Bisacodyl 10mg at HS   - Miralax   - Naloxegal 25mg daily     #/Bladder Mgmt   #ELLA  - USKB 5/20 negative   - Renvela 800mg TID    #FEN --Morbid obesity (BMI > 40).  - Diet - Regular + Thins  [CCHO]    - MVI     # Health Management:   Environmental challenge affecting dc plan--narrow door and need for ramp to house entrance  However, patient making goal directed effort towards therapy goals     #Precautions / PROPHYLAXIS:   - Falls  - ortho: Weight bearing status: WBAT in surgical shoe   - Lungs: Aspiration, Incentive Spirometer   - DVT: Lovenox  ----------------------------------------------      Function as at 6/2  Ambulating 35 ft  with RW wc follow through min assist (marked improvement)  Stand pivot transfers, bed to wc  Barrier--ulcers on feet, limiting wt bearing,  environmental barrier at home (stairs), severe obesity  Est dc revised to 6/13, issues with insurance, not approving FLORENCE      ----------------------------------------------  Dr. CANTU's Liaison with Family/Providers:  6/1- Met Patient's father during his visit to the unit during PM rounds, discssed review today, improved function, good pain control, appropriate use of offloading shoes  He is happy with this. Significant functional progress over last few days noted   The main challenge in DF strength, we will work on using ace wraps, and will get orthotics review for AFO if this persists    ----------------------------------------------  OUTPATIENT/FOLLOW UP:    Your Primary Care Provider,   Phone: (   )    -  Fax: (   )    -  Follow Up Time: 1 week    [facility] [place],   [address] [place] [address]  Phone: [phone]  Fax: (   )    -  Follow Up Time:    Dr. Waterhouse  Podiatry  [phone]  Within 1 week

## 2025-06-05 LAB
ALBUMIN SERPL ELPH-MCNC: 2 G/DL — LOW (ref 3.3–5)
ALP SERPL-CCNC: 63 U/L — SIGNIFICANT CHANGE UP (ref 40–120)
ALT FLD-CCNC: 19 U/L — SIGNIFICANT CHANGE UP (ref 10–45)
ANION GAP SERPL CALC-SCNC: 5 MMOL/L — SIGNIFICANT CHANGE UP (ref 5–17)
AST SERPL-CCNC: 24 U/L — SIGNIFICANT CHANGE UP (ref 10–40)
BASOPHILS # BLD AUTO: 0.03 K/UL — SIGNIFICANT CHANGE UP (ref 0–0.2)
BASOPHILS NFR BLD AUTO: 0.5 % — SIGNIFICANT CHANGE UP (ref 0–2)
BILIRUB SERPL-MCNC: 0.3 MG/DL — SIGNIFICANT CHANGE UP (ref 0.2–1.2)
BUN SERPL-MCNC: 13 MG/DL — SIGNIFICANT CHANGE UP (ref 7–23)
CALCIUM SERPL-MCNC: 9.2 MG/DL — SIGNIFICANT CHANGE UP (ref 8.4–10.5)
CHLORIDE SERPL-SCNC: 106 MMOL/L — SIGNIFICANT CHANGE UP (ref 96–108)
CO2 SERPL-SCNC: 31 MMOL/L — SIGNIFICANT CHANGE UP (ref 22–31)
CREAT SERPL-MCNC: 1.14 MG/DL — SIGNIFICANT CHANGE UP (ref 0.5–1.3)
EGFR: 85 ML/MIN/1.73M2 — SIGNIFICANT CHANGE UP
EGFR: 85 ML/MIN/1.73M2 — SIGNIFICANT CHANGE UP
EOSINOPHIL # BLD AUTO: 0.2 K/UL — SIGNIFICANT CHANGE UP (ref 0–0.5)
EOSINOPHIL NFR BLD AUTO: 3.2 % — SIGNIFICANT CHANGE UP (ref 0–6)
GLUCOSE SERPL-MCNC: 108 MG/DL — HIGH (ref 70–99)
HCT VFR BLD CALC: 30.2 % — LOW (ref 39–50)
HGB BLD-MCNC: 8.9 G/DL — LOW (ref 13–17)
IMM GRANULOCYTES NFR BLD AUTO: 0.2 % — SIGNIFICANT CHANGE UP (ref 0–0.9)
LYMPHOCYTES # BLD AUTO: 2.3 K/UL — SIGNIFICANT CHANGE UP (ref 1–3.3)
LYMPHOCYTES # BLD AUTO: 37.2 % — SIGNIFICANT CHANGE UP (ref 13–44)
MCHC RBC-ENTMCNC: 24.9 PG — LOW (ref 27–34)
MCHC RBC-ENTMCNC: 29.5 G/DL — LOW (ref 32–36)
MCV RBC AUTO: 84.4 FL — SIGNIFICANT CHANGE UP (ref 80–100)
MONOCYTES # BLD AUTO: 0.78 K/UL — SIGNIFICANT CHANGE UP (ref 0–0.9)
MONOCYTES NFR BLD AUTO: 12.6 % — SIGNIFICANT CHANGE UP (ref 2–14)
NEUTROPHILS # BLD AUTO: 2.87 K/UL — SIGNIFICANT CHANGE UP (ref 1.8–7.4)
NEUTROPHILS NFR BLD AUTO: 46.3 % — SIGNIFICANT CHANGE UP (ref 43–77)
NRBC BLD AUTO-RTO: 0 /100 WBCS — SIGNIFICANT CHANGE UP (ref 0–0)
PHOSPHATE SERPL-MCNC: 5.8 MG/DL — HIGH (ref 2.5–4.5)
PLATELET # BLD AUTO: 441 K/UL — HIGH (ref 150–400)
POTASSIUM SERPL-MCNC: 4 MMOL/L — SIGNIFICANT CHANGE UP (ref 3.5–5.3)
POTASSIUM SERPL-SCNC: 4 MMOL/L — SIGNIFICANT CHANGE UP (ref 3.5–5.3)
PROT SERPL-MCNC: 6.9 G/DL — SIGNIFICANT CHANGE UP (ref 6–8.3)
RBC # BLD: 3.58 M/UL — LOW (ref 4.2–5.8)
RBC # FLD: 18.7 % — HIGH (ref 10.3–14.5)
SODIUM SERPL-SCNC: 142 MMOL/L — SIGNIFICANT CHANGE UP (ref 135–145)
WBC # BLD: 6.19 K/UL — SIGNIFICANT CHANGE UP (ref 3.8–10.5)
WBC # FLD AUTO: 6.19 K/UL — SIGNIFICANT CHANGE UP (ref 3.8–10.5)

## 2025-06-05 PROCEDURE — 99232 SBSQ HOSP IP/OBS MODERATE 35: CPT

## 2025-06-05 PROCEDURE — 99233 SBSQ HOSP IP/OBS HIGH 50: CPT

## 2025-06-05 RX ORDER — METOPROLOL SUCCINATE 50 MG/1
25 TABLET, EXTENDED RELEASE ORAL
Refills: 0 | Status: DISCONTINUED | OUTPATIENT
Start: 2025-06-05 | End: 2025-06-10

## 2025-06-05 RX ADMIN — Medication 400 MILLIGRAM(S): at 08:13

## 2025-06-05 RX ADMIN — MUPIROCIN CALCIUM 1 APPLICATION(S): 20 CREAM TOPICAL at 17:20

## 2025-06-05 RX ADMIN — CELECOXIB 100 MILLIGRAM(S): 50 CAPSULE ORAL at 06:01

## 2025-06-05 RX ADMIN — SEVELAMER HYDROCHLORIDE 800 MILLIGRAM(S): 800 TABLET ORAL at 12:51

## 2025-06-05 RX ADMIN — GABAPENTIN 1200 MILLIGRAM(S): 400 CAPSULE ORAL at 06:01

## 2025-06-05 RX ADMIN — Medication 1 APPLICATION(S): at 05:53

## 2025-06-05 RX ADMIN — OXYCODONE HYDROCHLORIDE 20 MILLIGRAM(S): 30 TABLET ORAL at 07:01

## 2025-06-05 RX ADMIN — APIXABAN 5 MILLIGRAM(S): 2.5 TABLET, FILM COATED ORAL at 17:26

## 2025-06-05 RX ADMIN — APIXABAN 5 MILLIGRAM(S): 2.5 TABLET, FILM COATED ORAL at 06:02

## 2025-06-05 RX ADMIN — Medication 40 MILLIGRAM(S): at 06:01

## 2025-06-05 RX ADMIN — Medication 1 TABLET(S): at 12:52

## 2025-06-05 RX ADMIN — GABAPENTIN 1200 MILLIGRAM(S): 400 CAPSULE ORAL at 23:09

## 2025-06-05 RX ADMIN — MUPIROCIN CALCIUM 1 APPLICATION(S): 20 CREAM TOPICAL at 06:05

## 2025-06-05 RX ADMIN — SODIUM HYPOCHLORITE 1 APPLICATION(S): 0.12 SOLUTION TOPICAL at 17:20

## 2025-06-05 RX ADMIN — Medication 500 MILLIGRAM(S): at 12:52

## 2025-06-05 RX ADMIN — CELECOXIB 100 MILLIGRAM(S): 50 CAPSULE ORAL at 17:27

## 2025-06-05 RX ADMIN — Medication 9 MILLIGRAM(S): at 23:10

## 2025-06-05 RX ADMIN — Medication 220 MILLIGRAM(S): at 12:51

## 2025-06-05 RX ADMIN — WHITE PETROLATUM 1 APPLICATION(S): 1 OINTMENT TOPICAL at 05:54

## 2025-06-05 RX ADMIN — GABAPENTIN 1200 MILLIGRAM(S): 400 CAPSULE ORAL at 12:52

## 2025-06-05 RX ADMIN — SEVELAMER HYDROCHLORIDE 800 MILLIGRAM(S): 800 TABLET ORAL at 08:12

## 2025-06-05 RX ADMIN — OXYCODONE HYDROCHLORIDE 20 MILLIGRAM(S): 30 TABLET ORAL at 06:01

## 2025-06-05 RX ADMIN — TAMSULOSIN HYDROCHLORIDE 0.8 MILLIGRAM(S): 0.4 CAPSULE ORAL at 23:09

## 2025-06-05 RX ADMIN — Medication 1 APPLICATION(S): at 17:20

## 2025-06-05 RX ADMIN — QUETIAPINE FUMARATE 25 MILLIGRAM(S): 25 TABLET ORAL at 23:09

## 2025-06-05 RX ADMIN — Medication 400 MILLIGRAM(S): at 12:52

## 2025-06-05 RX ADMIN — SEVELAMER HYDROCHLORIDE 800 MILLIGRAM(S): 800 TABLET ORAL at 17:26

## 2025-06-05 RX ADMIN — Medication 400 MILLIGRAM(S): at 17:27

## 2025-06-05 RX ADMIN — OXYCODONE HYDROCHLORIDE 20 MILLIGRAM(S): 30 TABLET ORAL at 23:10

## 2025-06-05 RX ADMIN — Medication 1 APPLICATION(S): at 17:19

## 2025-06-05 RX ADMIN — NALOXEGOL OXALATE 25 MILLIGRAM(S): 12.5 TABLET, FILM COATED ORAL at 12:51

## 2025-06-05 RX ADMIN — SODIUM HYPOCHLORITE 1 APPLICATION(S): 0.12 SOLUTION TOPICAL at 05:53

## 2025-06-05 RX ADMIN — Medication 10 MILLIGRAM(S): at 23:11

## 2025-06-05 RX ADMIN — CELECOXIB 100 MILLIGRAM(S): 50 CAPSULE ORAL at 07:01

## 2025-06-05 RX ADMIN — CELECOXIB 100 MILLIGRAM(S): 50 CAPSULE ORAL at 17:29

## 2025-06-05 RX ADMIN — Medication 1 APPLICATION(S): at 12:45

## 2025-06-05 NOTE — PROGRESS NOTE ADULT - SUBJECTIVE AND OBJECTIVE BOX
Subjective  Patient seen and examined this AM with therapy.   Girlfriend present during encounter.  Admits to sleeping well. Significantly improved ROM, and overall function  Sacral pain reducing with donut cushion, sacral wound no active bleeding     ROS--no acute pain, no fever or chest pain   B/l leg and sacral ulcers,  LBM 6/4    Function -  -Ambulated 75' x 3 trials with bariatric RW needing CG/MIN A x 1 and wheelchair  follow. Progressed to reciprocal gait pattern utilizing less energy expenditure  with walking. Steppage gait pattern used today while wearing sneakers due to  weak ankle DF/foot drop.    Vital Signs Last 24 Hrs  T(C): 36.8 (05 Jun 2025 08:09), Max: 36.9 (04 Jun 2025 19:49)  T(F): 98.3 (05 Jun 2025 08:09), Max: 98.4 (04 Jun 2025 19:49)  HR: 83 (05 Jun 2025 08:09) (77 - 106)  BP: 104/70 (05 Jun 2025 08:09) (99/66 - 105/65)  RR: 16 (05 Jun 2025 08:09) (16 - 16)  SpO2: 94% (05 Jun 2025 08:09) (94% - 96%)  O2 Parameters below as of 05 Jun 2025 08:09  Patient On (Oxygen Delivery Method): room air    EXAM  Gen - Comfortable, cheerful, motivated   HEENT - NAD  Neck - Supple, No limited ROM  Pulm - Clear  Cardiovascular - RRR, S1S2, No murmurs  Abdomen - Soft, non tender, +BS  Extremities - chronic skin changes with hyperpigmentation at shin, callus on feet, responding to treatment   Ulcer on both feet examined, healthy scab noted     Neuro-  AAO X 3, Speech is normal     Motor - UE 5/5 Hip flexion 3/5, Knee 4/5 PF 4/5, DF 2/5     Sensory - Decreased to light touch bilateral lower extremities      Coordination - impaired lower extremities   Psychiatric - Mood stable, Affect WNL    Skin:  R foot plantar aspect lateral/distal side 7cm x 5.5cm ischemic wound from pressors  RLE 4th digit 1x1.5cm ischemic induced wound from pressors  RLE 5th digit 3x2cm ischemic induced wound from pressors  L foot plantar aspect lateral/distal side 6x7cm ischemic induced wound from pressor usage  LLE 4th digit 2x1cm ischemic induced wound from pressors  LLE 5th digit 2.5x3cm ischemic induced wound from pressors   R groin skin tear 1x1.5cm  Unstageable pressure wound cocyx 7cmx4.5cm w/ 2.5cm depth  R buttock pressure induced wound semi contiguous with coccyx wound calling unstageable given prior debridement 8.5x5.5cm  L buttock 1.0x0.5cm stage 3 pressure injury  R buttock skin tear 3.5cm x 0.5cm and 1.5cmx0.5cm  Dry skin with callus on sole of foot     MMS--antigravity upper extremities,    improved strength    RECENT LABS/IMAGING                        8.9    6.19  )-----------( 441      ( 05 Jun 2025 05:50 )             30.2     06-05    142  |  106  |  13  ----------------------------<  108[H]  4.0   |  31  |  1.14    Ca    9.2      05 Jun 2025 05:50  Phos  5.8     06-05    TPro  6.9  /  Alb  2.0[L]  /  TBili  0.3  /  DBili  x   /  AST  24  /  ALT  19  /  AlkPhos  63  06-05      Urinalysis Basic - ( 05 Jun 2025 05:50 )    Color: x / Appearance: x / SG: x / pH: x  Gluc: 108 mg/dL / Ketone: x  / Bili: x / Urobili: x   Blood: x / Protein: x / Nitrite: x   Leuk Esterase: x / RBC: x / WBC x   Sq Epi: x / Non Sq Epi: x / Bacteria: x    MEDICATIONS  (STANDING):  ammonium lactate 12% Lotion 1 Application(s) Topical two times a day  apixaban 5 milliGRAM(s) Oral two times a day  ascorbic acid 500 milliGRAM(s) Oral daily  bisacodyl 10 milliGRAM(s) Oral at bedtime  capsaicin 0.025% Cream 1 Application(s) Topical four times a day  celecoxib 100 milliGRAM(s) Oral two times a day  clotrimazole 1% Cream 1 Application(s) Topical <User Schedule>  collagenase Ointment 1 Application(s) Topical two times a day  Dakins Solution - 1/4 Strength 1 Application(s) Topical two times a day  dextrose 5%. 1000 milliLiter(s) (100 mL/Hr) IV Continuous <Continuous>  dextrose 5%. 1000 milliLiter(s) (50 mL/Hr) IV Continuous <Continuous>  dextrose 50% Injectable 25 Gram(s) IV Push once  dextrose 50% Injectable 12.5 Gram(s) IV Push once  dextrose 50% Injectable 25 Gram(s) IV Push once  gabapentin 1200 milliGRAM(s) Oral every 8 hours  glucagon  Injectable 1 milliGRAM(s) IntraMuscular once  influenza   Vaccine 0.5 milliLiter(s) IntraMuscular once  lidocaine   4% Patch 1 Patch Transdermal daily  lidocaine   4% Patch 1 Patch Transdermal daily  magnesium oxide 400 milliGRAM(s) Oral three times a day with meals  melatonin 9 milliGRAM(s) Oral at bedtime  metoprolol tartrate 25 milliGRAM(s) Oral two times a day  multivitamin 1 Tablet(s) Oral daily  mupirocin 2% Ointment 1 Application(s) Topical two times a day  naloxegol 25 milliGRAM(s) Oral daily  pantoprazole    Tablet 40 milliGRAM(s) Oral before breakfast  petrolatum white Ointment 1 Application(s) Topical every 8 hours  polyethylene glycol 3350 17 Gram(s) Oral two times a day  povidone iodine 10% Solution 1 Application(s) Topical daily  QUEtiapine 25 milliGRAM(s) Oral at bedtime  sevelamer carbonate 800 milliGRAM(s) Oral three times a day with meals  silver nitrate Applicator 6 Application(s) Topical once  tamsulosin 0.8 milliGRAM(s) Oral at bedtime  zinc sulfate 220 milliGRAM(s) Oral daily    MEDICATIONS  (PRN):  albuterol/ipratropium for Nebulization 3 milliLiter(s) Nebulizer every 6 hours PRN Shortness of Breath and/or Wheezing  dextrose Oral Gel 15 Gram(s) Oral once PRN Blood Glucose LESS THAN 70 milliGRAM(s)/deciliter  oxyCODONE    IR 20 milliGRAM(s) Oral every 6 hours PRN Severe Pain (7 - 10)  oxyCODONE    IR 15 milliGRAM(s) Oral two times a day PRN Moderate Pain (4 - 6)  oxyCODONE    IR 10 milliGRAM(s) Oral two times a day PRN Mild Pain (1 - 3)

## 2025-06-05 NOTE — PROGRESS NOTE ADULT - SUBJECTIVE AND OBJECTIVE BOX
Subjective  Patient seen and examined this AM with therapy.   Girlfriend present during encounter.  Admits to sleeping well. Improved ROM and markedly reduced pain symptoms.  Sacral pain reducing with donut cushion, sacral wound no active bleeding.  Await BL AFOs.    ROS--no acute pain, no fever or chest pain   B/l leg and sacral ulcers,  LBM 6/4    Function --Further functional improvement  -Ambulated 75' x 3 trials with bariatric RW needing CG/MIN A x 1 and wheelchair  follow. Progressed to reciprocal gait pattern utilizing less energy expenditure  with walking. Steppage gait pattern used today while wearing sneakers due to  weak ankle DF/foot drop.      Vital Signs Last 24 Hrs  T(C): 36.8 (05 Jun 2025 08:09), Max: 36.9 (04 Jun 2025 19:49)  T(F): 98.3 (05 Jun 2025 08:09), Max: 98.4 (04 Jun 2025 19:49)  HR: 83 (05 Jun 2025 08:09) (77 - 106)  BP: 104/70 (05 Jun 2025 08:09) (99/66 - 105/65)  BP(mean): --  RR: 16 (05 Jun 2025 08:09) (16 - 16)  SpO2: 94% (05 Jun 2025 08:09) (94% - 96%)      EXAM  Gen - Comfortable, cheerful, engaging   HEENT - NAD  Neck - Supple, No limited ROM  Pulm - Clear  Cardiovascular - RRR, S1S2, No murmurs  Abdomen - Soft, non tender, +BS  Extremities - chronic skin changes with hyperpigmentation at shin, callus on feet    Neuro-  AAO X 3, Speech is normal     Motor - UE 5/5 Hip flexion 3/5, Knee 4/5 PF 4/5, DF 2/5     Sensory - Decreased to light touch bilateral lower extremities      Coordination - impaired lower extremities   Psychiatric - Mood stable, Affect WNL    Skin:  R foot plantar aspect lateral/distal side 7cm x 5.5cm ischemic wound from pressors  RLE 4th digit 1x1.5cm ischemic induced wound from pressors  RLE 5th digit 3x2cm ischemic induced wound from pressors  L foot plantar aspect lateral/distal side 6x7cm ischemic induced wound from pressor usage  LLE 4th digit 2x1cm ischemic induced wound from pressors  LLE 5th digit 2.5x3cm ischemic induced wound from pressors   R groin skin tear 1x1.5cm  Unstageable pressure wound cocyx 7cmx4.5cm w/ 2.5cm depth  R buttock pressure induced wound semi contiguous with coccyx wound calling unstageable given prior debridement 8.5x5.5cm  L buttock 1.0x0.5cm stage 3 pressure injury  R buttock skin tear 3.5cm x 0.5cm and 1.5cmx0.5cm  Dry skin with callus on sole of foot     MMS--antigravity upper extremities,        LABS:                        8.9    6.19  )-----------( 441      ( 05 Jun 2025 05:50 )             30.2     06-05    142  |  106  |  13  ----------------------------<  108[H]  4.0   |  31  |  1.14    Ca    9.2      05 Jun 2025 05:50  Phos  5.8     06-05    TPro  6.9  /  Alb  2.0[L]  /  TBili  0.3  /  DBili  x   /  AST  24  /  ALT  19  /  AlkPhos  63  06-05      Urinalysis Basic - ( 05 Jun 2025 05:50 )    Color: x / Appearance: x / SG: x / pH: x  Gluc: 108 mg/dL / Ketone: x  / Bili: x / Urobili: x   Blood: x / Protein: x / Nitrite: x   Leuk Esterase: x / RBC: x / WBC x   Sq Epi: x / Non Sq Epi: x / Bacteria: x          MEDICATIONS  (STANDING):  ammonium lactate 12% Lotion 1 Application(s) Topical two times a day  apixaban 5 milliGRAM(s) Oral two times a day  ascorbic acid 500 milliGRAM(s) Oral daily  bisacodyl 10 milliGRAM(s) Oral at bedtime  capsaicin 0.025% Cream 1 Application(s) Topical four times a day  celecoxib 100 milliGRAM(s) Oral two times a day  clotrimazole 1% Cream 1 Application(s) Topical <User Schedule>  collagenase Ointment 1 Application(s) Topical two times a day  Dakins Solution - 1/4 Strength 1 Application(s) Topical two times a day  dextrose 5%. 1000 milliLiter(s) (100 mL/Hr) IV Continuous <Continuous>  dextrose 5%. 1000 milliLiter(s) (50 mL/Hr) IV Continuous <Continuous>  dextrose 50% Injectable 25 Gram(s) IV Push once  dextrose 50% Injectable 12.5 Gram(s) IV Push once  dextrose 50% Injectable 25 Gram(s) IV Push once  gabapentin 1200 milliGRAM(s) Oral every 8 hours  glucagon  Injectable 1 milliGRAM(s) IntraMuscular once  influenza   Vaccine 0.5 milliLiter(s) IntraMuscular once  lidocaine   4% Patch 1 Patch Transdermal daily  lidocaine   4% Patch 1 Patch Transdermal daily  magnesium oxide 400 milliGRAM(s) Oral three times a day with meals  melatonin 9 milliGRAM(s) Oral at bedtime  metoprolol tartrate 25 milliGRAM(s) Oral two times a day  multivitamin 1 Tablet(s) Oral daily  mupirocin 2% Ointment 1 Application(s) Topical two times a day  naloxegol 25 milliGRAM(s) Oral daily  pantoprazole    Tablet 40 milliGRAM(s) Oral before breakfast  petrolatum white Ointment 1 Application(s) Topical every 8 hours  polyethylene glycol 3350 17 Gram(s) Oral two times a day  povidone iodine 10% Solution 1 Application(s) Topical daily  QUEtiapine 25 milliGRAM(s) Oral at bedtime  sevelamer carbonate 800 milliGRAM(s) Oral three times a day with meals  silver nitrate Applicator 6 Application(s) Topical once  tamsulosin 0.8 milliGRAM(s) Oral at bedtime  zinc sulfate 220 milliGRAM(s) Oral daily    MEDICATIONS  (PRN):  albuterol/ipratropium for Nebulization 3 milliLiter(s) Nebulizer every 6 hours PRN Shortness of Breath and/or Wheezing  dextrose Oral Gel 15 Gram(s) Oral once PRN Blood Glucose LESS THAN 70 milliGRAM(s)/deciliter  oxyCODONE    IR 20 milliGRAM(s) Oral every 6 hours PRN Severe Pain (7 - 10)  oxyCODONE    IR 15 milliGRAM(s) Oral two times a day PRN Moderate Pain (4 - 6)  oxyCODONE    IR 10 milliGRAM(s) Oral two times a day PRN Mild Pain (1 - 3)

## 2025-06-05 NOTE — PROGRESS NOTE ADULT - SUBJECTIVE AND OBJECTIVE BOX
Interval History  Patient seen and examined at bedside. No acute events noted.  Patient denied any acute complaints this morning. Pain is controlled on current regimen.     ALLERGIES:  No Known Allergies    MEDICATIONS  (STANDING):  ammonium lactate 12% Lotion 1 Application(s) Topical two times a day  apixaban 5 milliGRAM(s) Oral two times a day  ascorbic acid 500 milliGRAM(s) Oral daily  bisacodyl 10 milliGRAM(s) Oral at bedtime  capsaicin 0.025% Cream 1 Application(s) Topical four times a day  celecoxib 100 milliGRAM(s) Oral two times a day  clotrimazole 1% Cream 1 Application(s) Topical <User Schedule>  collagenase Ointment 1 Application(s) Topical two times a day  Dakins Solution - 1/4 Strength 1 Application(s) Topical two times a day  dextrose 5%. 1000 milliLiter(s) (100 mL/Hr) IV Continuous <Continuous>  dextrose 5%. 1000 milliLiter(s) (50 mL/Hr) IV Continuous <Continuous>  dextrose 50% Injectable 25 Gram(s) IV Push once  dextrose 50% Injectable 12.5 Gram(s) IV Push once  dextrose 50% Injectable 25 Gram(s) IV Push once  gabapentin 1200 milliGRAM(s) Oral every 8 hours  glucagon  Injectable 1 milliGRAM(s) IntraMuscular once  influenza   Vaccine 0.5 milliLiter(s) IntraMuscular once  lidocaine   4% Patch 1 Patch Transdermal daily  lidocaine   4% Patch 1 Patch Transdermal daily  magnesium oxide 400 milliGRAM(s) Oral three times a day with meals  melatonin 9 milliGRAM(s) Oral at bedtime  metoprolol tartrate 25 milliGRAM(s) Oral two times a day  multivitamin 1 Tablet(s) Oral daily  mupirocin 2% Ointment 1 Application(s) Topical two times a day  naloxegol 25 milliGRAM(s) Oral daily  pantoprazole    Tablet 40 milliGRAM(s) Oral before breakfast  petrolatum white Ointment 1 Application(s) Topical every 8 hours  polyethylene glycol 3350 17 Gram(s) Oral two times a day  povidone iodine 10% Solution 1 Application(s) Topical daily  QUEtiapine 25 milliGRAM(s) Oral at bedtime  sevelamer carbonate 800 milliGRAM(s) Oral three times a day with meals  silver nitrate Applicator 6 Application(s) Topical once  tamsulosin 0.8 milliGRAM(s) Oral at bedtime  zinc sulfate 220 milliGRAM(s) Oral daily    MEDICATIONS  (PRN):  albuterol/ipratropium for Nebulization 3 milliLiter(s) Nebulizer every 6 hours PRN Shortness of Breath and/or Wheezing  dextrose Oral Gel 15 Gram(s) Oral once PRN Blood Glucose LESS THAN 70 milliGRAM(s)/deciliter  oxyCODONE    IR 20 milliGRAM(s) Oral every 6 hours PRN Severe Pain (7 - 10)  oxyCODONE    IR 15 milliGRAM(s) Oral two times a day PRN Moderate Pain (4 - 6)  oxyCODONE    IR 10 milliGRAM(s) Oral two times a day PRN Mild Pain (1 - 3)    Vital Signs Last 24 Hrs  T(F): 98.3 (05 Jun 2025 08:09), Max: 98.4 (04 Jun 2025 19:49)  HR: 112 (05 Jun 2025 17:25) (77 - 112)  BP: 100/66 (05 Jun 2025 17:25) (99/66 - 105/65)  RR: 16 (05 Jun 2025 17:25) (16 - 16)  SpO2: 94% (05 Jun 2025 17:25) (94% - 96%)  I&O's Summary    PHYSICAL EXAM:  GENERAL: NAD  HEENT: NCAT  CHEST/LUNG: Clear to percussion bilaterally; No rales, rhonchi, wheezing  HEART: Regular rate and rhythm  ABDOMEN: Soft, Nontender, Nondistended; Bowel sounds present  MUSCULOSKELETAL/EXTREMITIES: @+ pulses, No edema   PSYCH: Appropriate affect  NEURO: Alert & Oriented x 3    I personally reviewed the below data/images/labs:    LABS:                        8.9    6.19  )-----------( 441      ( 05 Jun 2025 05:50 )             30.2       06-05    142  |  106  |  13  ----------------------------<  108  4.0   |  31  |  1.14    Ca    9.2      05 Jun 2025 05:50  Phos  5.8     06-05    TPro  6.9  /  Alb  2.0  /  TBili  0.3  /  DBili  x   /  AST  24  /  ALT  19  /  AlkPhos  63  06-05     03-08 Chol -- LDL -- HDL -- Trig 169 mg/dL    Urinalysis Basic - ( 05 Jun 2025 05:50 )    Color: x / Appearance: x / SG: x / pH: x  Gluc: 108 mg/dL / Ketone: x  / Bili: x / Urobili: x   Blood: x / Protein: x / Nitrite: x   Leuk Esterase: x / RBC: x / WBC x   Sq Epi: x / Non Sq Epi: x / Bacteria: x    Consultant(s) Notes Reviewed:   Care Discused with Consultants/Other Providers:  Imaging Personally Reviewed:

## 2025-06-05 NOTE — PROGRESS NOTE ADULT - ASSESSMENT
39 year old male  with PMH of morbid obesity, BEA, pAFIB (not on AC), HTN, and BL papilledema attributed to pseudotumor cerebri; who initially presented to  on 2/24 with SOB and BL LE edema. Found to have URI with progressive SOB and multifocal PNA on imaging. His hospital course was complicated by worsening respiratory distress and increased O2 demands secondary to secretions (requiring multiple intubation attempts) and eventual MICU admission. While in MICU, his O2 requirements continued despite optimal vent management. Concern noted for progressive ARDS, unable to prone given morbid obesity, and ultimately cannulated for vvECMO on 2/25 and transferred to [facility] for further management on 2/26. His hospital course at [facility] was significant for the following: diuretic and ABX management, s/p trach and PEG (placed on 3/7- PEG since removed), RCU admission, high grade fevers (secondary to panniculitis), progressively worsening sacral ulcer (likely osteomyelitis- s/p surgical debridement), BL foot wound (secondary to pressor use), neuropathy (secondary to critical illness polyneuropathy), ELLA (secondary to vancomycin toxicity and overdiuresis- since DCd- with improvement). Patient now admitted for a multidisciplinary rehab program. 04-25-25     #Left hip pain - Improving  -Hip xray--> no fracture  -CT hip: Again seen is a sacral decubitus wound which extends superficial as well as involving the right gluteus musculature and the posterior L5 with small amount of fluid and gas within the tract. Limited involvement on this noncontrast CT. This likely corresponds to adjacent phlegmonous changes  -MRI left hip: Incompletely characterized sacral wound with associated ill-defined collection of fluid and soft tissue gas which extends to/involve the right gluteus zurdo muscle as at CT pelvis study from one day prior. No interval change in the high STIR signal within the bilateral gluteal and thigh musculature suggestive of a myositis as compared to prior MRI study. No fracture, osseous destruction or aggressive periosteal reaction. No osteomyelitis at the left hip. Edema-like signal along the right and left greater trochanteric regions, overall increased as compared to the prior MRI study from 4/11/2025. Findings are felt to be reactive in etiology.  -Continue comprehensive rehab program - PT/OT/SLP per rehab team  -Pain management, bowel regimen per rehab     #Abnormal CT A/P  -5/2: CT A/P: Question of pneumatosis in the cecum with no other evidence of bowel ischemia. Correlation with lactate levels and clinical exam would be helpful.  -Patient has no symptoms. Abdomen is benign. Tolerating PO  -5/2: Surgery cx noted: no intervention. c/w PO. refer to bariatric surgery post-DC dr botello at [place] or Dr. Segovia at [place] [per patient request]  -5/3 GI cx noted: no GI intervention planned. c/w PO    #Stage IV Sacral Decub Ulcer  #Bilateral Buttocks Wounds  #Bilateral Foot Wounds  -5/2 CT A/P: An air and fluid containing collection in the right gluteus zurdo muscle in continuity with a subcutaneous sacral collection. No obvious skin defect to suggest a decubitus ulcer.  -5/7: MRI left hip: Incompletely characterized sacral wound with associated ill-defined collection of fluid and soft tissue gas which extends to/involve the right gluteus zurdo muscle as at CT pelvis study from one day prior. No interval change in the high STIR signal within the bilateral gluteal and thigh musculature suggestive of a myositis as compared to prior MRI study. No fracture, osseous destruction or aggressive periosteal reaction. No osteomyelitis at the left hip. Edema-like signal along the right and left greater trochanteric regions, overall increased as compared to the prior MRI study from 4/11/2025. Findings are felt to be reactive in etiology..  -Continue local wound care  -MVI, vitamin C and zinc   -Off load pressure  -Plastic consult following  -ID follow up noted and appreciated  -Per plastics - continue current management    #Fungemia 2/2 Panniculitis - resolved  -Blood culture 3/15, 3/16 with candida albicans, s/p course of antifungals   -Repeat cultures negative since 3/18    #Debility Secondary to Acute Hypoxic Respiratory Failure/ARDS 2/2 PNA  #BEA (Not on CPAP)  #Critical Illness Polyneuropathy   -s/p VV ECMO, s/p diuresis, TTE/ROBERT with RV failure, s/p treatment for pneumonia  -Repeat Echo 3/11 showed EF 66 %. RV is not well visualized, enlarged RV cavity size a/ reduced RV systolic function. No significant valvular disease.  No echocardiographic evidence of pulmonary hypertension  -s/p Trach (decannulated 3/28) and PEG (removed 4/15)   -Saturating well on RA. Continue duoneb prn  -BIPAP qhs (FiO2 40%, 18/10) via nasal mask   -Fall precaution  -Pain control per rehab team     #p-AFIB  #Sinus Tachycardia (Resolved)  -Patient has intermittent tachycardia. Per patient, His HR  mostly above 110 even when he is not sick. follows with cardio OP  -5/3: EKG: NSR@94 bpm  -Eliquis   -Off norvasc  -Continue metoprolol, reduce dose to 25mg BID given soft BP (6/4)  -TSH WNL 2/25  -Sinus tachycardia likely multifactorial including pain and deconditioning. Low suspicion for PE, no SOB/hypoxia and he is already on full AC    #RIJ and Bilateral LE DVT  -Duplex RUE 3/14 showed Acute, non-occlusive deep vein thrombosis noted within the right internal jugular vein. Superficial vein thrombosis noted within the right antecubital cephalic vein  -Duplex LE 3/14 showed Acute, occlusive deep vein thromboses noted within the right and left peroneal veins at both mid calves. Age-indeterminate, occlusive deep vein thrombosis visualized within the left gastrocnemius vein at the proximal calf.  -Continue eliquis  -Repeat duplex 4/29 showed No evidence of deep venous thrombosis in either lower extremity.    #Normocytic Anemia - Likely multifactorial  -H/H stable, no evidence of active bleed   -Monitor CBC    #HTN  -Amlodipine d/melly  -Continue Metoprolol, reduce dose to 25mg BID with holding parameters  -Monitor BP     #History of Pseudotumor Cerebri   -Outpatient follow up     #ELLA - resolved  -Baseline Cr appears to be ~0.8 range   -ELLA felt to be multifactorial in setting of cardiorenal w/ RVE, recurrent ELLA suspected due to vancomycin toxicity and diuresis   -Continue to encourage oral hydration   -Renvela   -Low phos diet  -Monitor BMP and phos level     #Type 2 Diabetes, HbA1C 4.9 (4/28)  -FS wnl. Monitor glucose on routine lab, controlled     #Urinary Retention  -Flomax 5/1  -Renal u/s 5/20 unremarkable    #Mood  -Continue Seroquel    #DVT ppx - Eliquis  #GI ppx - PPI    Discussed with rehab team

## 2025-06-05 NOTE — PROGRESS NOTE ADULT - ASSESSMENT
39 year old male with PMH of morbid obesity, BEA on CPAP, pAFIB (not on AC prior to admission), HTN, and BL papilledema attributed to pseudotumor cerebri; who initially presented to  on 2/24 with SOB and BL LE edema. Found to have URI with progressive SOB and multifocal PNA on imaging. His hospital course was complicated by worsening respiratory distress and increased 02 demands secondary to secretions (requiring multiple intubation attempts) and eventual MICU admission. While in MICU, he 02 requirements continued despite optimal vent management. Concern noted for progressive ARDS, unable to prone given morbid obesity, and ultimately cannulated for vvECMO on 2/25 and transferred to [facility] for further management on 2/26.  His hospital course at [facility] was significant for the following: diuretic and ABX management, s/p trache and PEG (placed on 3/7- PEG since removed), RCU admission, high grade fevers (secondary to panniculitis), progressively worsening sacral ulcer (likely osteomyelitis- s/p surgical debridement), BL foot wound (secondary to pressor use), neuropathy (secondary to critical illness polyneuropathy), ELLA (secondary to vancomycin toxicity and overdiuresis- since DCd- with improvement). Patient now admitted for a multidisciplinary rehab program. 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    - Gait Instability, ADL impairments and Functional impairments: start Comprehensive Rehab Program of PT/OT  - 3 hours a day, 5 days a week   - PRN: Nebulizer QID     #  Persisting/significant DF weakness - await orthotic eval for BL AFOs  - await orthotic eval for BL AFOs    #BEA on BIPAP  --via nasal canula qhs    #Sacral Osteomyelitis  - Zosyn TID - completed on 5/12/25    #Paroxysmal AFIB  - Eliquis 5mg BID     #Calf Tenderness/pain b/l feet  - BL LE doppler negative 5/21   --Apply offloading boots when OOB      #L hip pain - resolved  - no acute findings on XR or CT   - L hip MRI (5/7) no significant findings     #HTN  - Amlodipine 10mg dialy     #Sleep/Mood  - Melatonin 9mg at HS   - Quetiapine 25mg at HS     #Skin  Wound Recs per Plastic Surgery (5/16):  Sacral Pressure Injury: BID- Cleanse with NS, remove excess fluid, apply santyl ointment to sloughing tissues, pack wound with 1-inch dry plain packing, cover with 4 inch gauze and abd combine  - R buttock: BID- Cleanse with NS, overlay calcium alginate, ABD combine dressing  Additional wound care instructions:  -->Right anterior thigh to groin isolated wound: Clean wound and periwound skin with NS. Pat dry. Apply liquid barrier film to periwound skin, allow to dry. Cover with silicone foam with border. Change daily or PRN if soiled   --> Bilateral abdominal pannus, bilateral groin: Cleanse with luke-warm soap and water, dry well. Apply clotrimazole  --> Bilateral feet: Apply betadine  to bilateral foot wounds followed by 4x4 gauze, ABD pad, and matilde daily per podiatry.  --> Continue to offload pressure; bariatric low airloss support surface, turn and position per protocol with use of fluidized positioning devices, continue incontinence and moisture care per protocol and use of single breathable incontinence pads, continue to offload heels with fluidized positioning devices. Continue use of bariatric seat cushion, limit sit time- no more than 2 consecutive hours at any given time.  - Pressure injury/Skin: OOB to Chair, PT/OT    - Ammonium lactate to BL LEs     #Neuropathy/sacral ulcer  - Gabapentin 1200mg TID --aim to taper to lower dose    #Pain Mgmt   - Capsaicin cream to BL feet QID - Avoid use on damaged, broken, or irritated skin. Avoid occlusive dressing or heat   - Lidocaine patch to BL thighs   - PRN:; Oxycodone and Tylenol 650mg QID   -celebrex 100mg bid for sacral pain   - neuropathic pain b/l feet c/w   gabapentin    #Morbid obesity  - Most recent weight 394lbs (5/7)     #GI/Bowel Mgmt   - Pantoprazole  - Bisacodyl 10mg at HS   - Miralax   - Naloxegal 25mg daily     #/Bladder Mgmt   #ELLA  - USKB 5/20 negative   - Renvela 800mg TID    #FEN --Morbid obesity (BMI > 40).  - Diet - Regular + Thins  [CCHO]    - MVI     # Health Management:   Environmental challenge affecting dc plan--narrow door and need for ramp to house entrance  However, patient making goal directed effort towards therapy goals     #Precautions / PROPHYLAXIS:   - Falls  - ortho: Weight bearing status: WBAT in surgical shoe   - Lungs: Aspiration, Incentive Spirometer   - DVT: Lovenox  ----------------------------------------------      Function as at 6/2  Ambulating 35 ft  with RW wc follow through min assist (marked improvement)  Stand pivot transfers, bed to wc  Barrier--ulcers on feet, limiting wt bearing,  environmental barrier at home (stairs), severe obesity  Est dc revised to 6/13, issues with insurance, not approving [facility]      ----------------------------------------------  Dr. CANTU's Liaison with Family/Providers:  6/1- Met Patient's father during his visit to the unit during PM rounds, discssed review today, improved function, good pain control, appropriate use of offloading shoes  He is happy with this. Significant functional progress over last few days noted   The main challenge in DF strength, we will work on using ace wraps, and will get orthotics review for AFO if this persists    ----------------------------------------------  OUTPATIENT/FOLLOW UP:    Your Primary Care Provider,   Phone: (   )    -  Fax: (   )    -  Follow Up Time: 1 week    [facility] [place],   [address] [place] [address]  Phone: [phone]  Fax: (   )    -  Follow Up Time:    Dr. Waterhouse  Podiatry  [phone]  Within 1 week

## 2025-06-05 NOTE — PROGRESS NOTE ADULT - ASSESSMENT
39 year old male with PMH of morbid obesity, BEA on CPAP, pAFIB (not on AC prior to admission), HTN, and BL papilledema attributed to pseudotumor cerebri; who initially presented to  on 2/24 with SOB and BL LE edema. Found to have URI with progressive SOB and multifocal PNA on imaging. His hospital course was complicated by worsening respiratory distress and increased 02 demands secondary to secretions (requiring multiple intubation attempts) and eventual MICU admission. While in MICU, he 02 requirements continued despite optimal vent management. Concern noted for progressive ARDS, unable to prone given morbid obesity, and ultimately cannulated for vvECMO on 2/25 and transferred to [facility] for further management on 2/26.  His hospital course at [facility] was significant for the following: diuretic and ABX management, s/p trache and PEG (placed on 3/7- PEG since removed), RCU admission, high grade fevers (secondary to panniculitis), progressively worsening sacral ulcer (likely osteomyelitis- s/p surgical debridement), BL foot wound (secondary to pressor use), neuropathy (secondary to critical illness polyneuropathy), ELLA (secondary to vancomycin toxicity and overdiuresis- since DCd- with improvement). Patient now admitted for a multidisciplinary rehab program. 04-25-25 @ 15:19      * Continued and remarkable funtional improvement, pain controlled  * Persisting/significant DF weakness - await orthotic eval for BL AFOs    * Will start tapering Gabapentin dose  * Shower tomorrow- water and run on both feet, cover sacral wound during shower    # Critical illness myopathy due to respiratory failure and sepsis    - Gait Instability, ADL impairments and Functional impairments: start Comprehensive Rehab Program of PT/OT  - 3 hours a day, 5 days a week   - PRN: Nebulizer QID     #  Persisting/significant DF weakness - await orthotic eval for BL AFOs  - await orthotic eval for BL AFOs    #BEA on BIPAP  --via nasal canula qhs    #Sacral Osteomyelitis  - Zosyn TID - completed on 5/12/25    #Paroxysmal AFIB  - Eliquis 5mg BID     #Calf Tenderness/pain b/l feet  - BL LE doppler negative 5/21   --Apply offloading boots when OOB      #L hip pain - resolved  - no acute findings on XR or CT   - L hip MRI (5/7) no significant findings     #HTN  - Amlodipine 10mg dialy     #Sleep/Mood  - Melatonin 9mg at HS   - Quetiapine 25mg at HS     #Skin  Wound Recs per Plastic Surgery (5/16):  Sacral Pressure Injury: BID- Cleanse with NS, remove excess fluid, apply santyl ointment to sloughing tissues, pack wound with 1-inch dry plain packing, cover with 4 inch gauze and abd combine  - R buttock: BID- Cleanse with NS, overlay calcium alginate, ABD combine dressing  Additional wound care instructions:  -->Right anterior thigh to groin isolated wound: Clean wound and periwound skin with NS. Pat dry. Apply liquid barrier film to periwound skin, allow to dry. Cover with silicone foam with border. Change daily or PRN if soiled   --> Bilateral abdominal pannus, bilateral groin: Cleanse with luke-warm soap and water, dry well. Apply clotrimazole  --> Bilateral feet: Apply betadine  to bilateral foot wounds followed by 4x4 gauze, ABD pad, and matilde daily per podiatry.  --> Continue to offload pressure; bariatric low airloss support surface, turn and position per protocol with use of fluidized positioning devices, continue incontinence and moisture care per protocol and use of single breathable incontinence pads, continue to offload heels with fluidized positioning devices. Continue use of bariatric seat cushion, limit sit time- no more than 2 consecutive hours at any given time.  - Pressure injury/Skin: OOB to Chair, PT/OT    - Ammonium lactate to BL LEs     #Neuropathy/sacral ulcer  - Gabapentin 1200mg TID --aim to taper to lower dose    #Pain Mgmt   - Capsaicin cream to BL feet QID - Avoid use on damaged, broken, or irritated skin. Avoid occlusive dressing or heat   - Lidocaine patch to BL thighs   - PRN:; Oxycodone and Tylenol 650mg QID   -celebrex 100mg bid for sacral pain   - neuropathic pain b/l feet c/w   gabapentin    #Morbid obesity  - Most recent weight 394lbs (5/7)     #GI/Bowel Mgmt   - Pantoprazole  - Bisacodyl 10mg at HS   - Miralax   - Naloxegal 25mg daily     #/Bladder Mgmt   #ELLA  - USKB 5/20 negative   - Renvela 800mg TID    #FEN --Morbid obesity (BMI > 40).  - Diet - Regular + Thins  [CCHO]    - MVI     # Health Management:   Environmental challenge affecting dc plan--narrow door and need for ramp to house entrance  However, patient making goal directed effort towards therapy goals     #Precautions / PROPHYLAXIS:   - Falls  - ortho: Weight bearing status: WBAT in surgical shoe   - Lungs: Aspiration, Incentive Spirometer   - DVT: Lovenox  ----------------------------------------------      Function as at 6/2  Ambulating 35 ft  with RW wc follow through min assist (marked improvement)  Stand pivot transfers, bed to wc  Barrier--ulcers on feet, limiting wt bearing,  environmental barrier at home (stairs), severe obesity  Est dc revised to 6/13, issues with insurance, not approving FLORENCE      ----------------------------------------------  Dr. CANTU's Liaison with Family/Providers:  6/1- Met Patient's father during his visit to the unit during PM rounds, discssed review today, improved function, good pain control, appropriate use of offloading shoes  He is happy with this. Significant functional progress over last few days noted   The main challenge in DF strength, we will work on using ace wraps, and will get orthotics review for AFO if this persists    ----------------------------------------------  OUTPATIENT/FOLLOW UP:    Your Primary Care Provider,   Phone: (   )    -  Fax: (   )    -  Follow Up Time: 1 week    [facility] [place],   [address] [place] [address]  Phone: [phone]  Fax: (   )    -  Follow Up Time:    Dr. Waterhouse  Podiatry  [phone]  Within 1 week       Spent 52 mins, patient review, discussion of labs and functional progress, update to family/girlfriend, care co ordination   39 year old male with PMH of morbid obesity, BEA on CPAP, pAFIB (not on AC prior to admission), HTN, and BL papilledema attributed to pseudotumor cerebri; who initially presented to  on 2/24 with SOB and BL LE edema. Found to have URI with progressive SOB and multifocal PNA on imaging. His hospital course was complicated by worsening respiratory distress and increased 02 demands secondary to secretions (requiring multiple intubation attempts) and eventual MICU admission. While in MICU, he 02 requirements continued despite optimal vent management. Concern noted for progressive ARDS, unable to prone given morbid obesity, and ultimately cannulated for vvECMO on 2/25 and transferred to [facility] for further management on 2/26.  His hospital course at [facility] was significant for the following: diuretic and ABX management, s/p trache and PEG (placed on 3/7- PEG since removed), RCU admission, high grade fevers (secondary to panniculitis), progressively worsening sacral ulcer (likely osteomyelitis- s/p surgical debridement), BL foot wound (secondary to pressor use), neuropathy (secondary to critical illness polyneuropathy), ELLA (secondary to vancomycin toxicity and overdiuresis- since DCd- with improvement). Patient now admitted for a multidisciplinary rehab program. 04-25-25 @ 15:19      * Continued and remarkable funtional improvement, pain controlled  * Persisting/significant DF weakness - await orthotic eval for BL AFOs    * Will start tapering Gabapentin dose  * Shower tomorrow- water and run on both feet, cover sacral wound during shower    # Critical illness myopathy due to respiratory failure and sepsis    - Gait Instability, ADL impairments and Functional impairments: start Comprehensive Rehab Program of PT/OT  - 3 hours a day, 5 days a week   - PRN: Nebulizer QID     # Persisting/significant DF weakness AFOs  - Orthotic eval for BL solid AFOs   - This patient has a diagnosis of BL foot drop. Patient is ambulatory. Patient requires a custom molded hinged AFO to preserve ankle motion while stabilizing talar and subtalar joints. Device will give a more natural gait without destabilizing the knee and hip. Device requires a low density lining to protect prominent roderick areas of effected skin. Patient will need the device for a term of greater than 9 months. No reasonable prefabricated orthosis exists.     #BEA on BIPAP  --via nasal canula qhs    #Sacral Osteomyelitis  - Zosyn TID - completed on 5/12/25    #Paroxysmal AFIB  - Eliquis 5mg BID     #Calf Tenderness/pain b/l feet  - BL LE doppler negative 5/21   --Apply offloading boots when OOB      #L hip pain - resolved  - no acute findings on XR or CT   - L hip MRI (5/7) no significant findings     #HTN  - Amlodipine 10mg dialy     #Sleep/Mood  - Melatonin 9mg at HS   - Quetiapine 25mg at HS     #Skin  Wound Recs per Plastic Surgery (5/16):  Sacral Pressure Injury: BID- Cleanse with NS, remove excess fluid, apply santyl ointment to sloughing tissues, pack wound with 1-inch dry plain packing, cover with 4 inch gauze and abd combine  - R buttock: BID- Cleanse with NS, overlay calcium alginate, ABD combine dressing  Additional wound care instructions:  -->Right anterior thigh to groin isolated wound: Clean wound and periwound skin with NS. Pat dry. Apply liquid barrier film to periwound skin, allow to dry. Cover with silicone foam with border. Change daily or PRN if soiled   --> Bilateral abdominal pannus, bilateral groin: Cleanse with luke-warm soap and water, dry well. Apply clotrimazole  --> Bilateral feet: Apply betadine  to bilateral foot wounds followed by 4x4 gauze, ABD pad, and matilde daily per podiatry.  --> Continue to offload pressure; bariatric low airloss support surface, turn and position per protocol with use of fluidized positioning devices, continue incontinence and moisture care per protocol and use of single breathable incontinence pads, continue to offload heels with fluidized positioning devices. Continue use of bariatric seat cushion, limit sit time- no more than 2 consecutive hours at any given time.  - Pressure injury/Skin: OOB to Chair, PT/OT    - Ammonium lactate to BL LEs     #Neuropathy/sacral ulcer  - Gabapentin 1200mg TID --aim to taper to lower dose    #Pain Mgmt   - Capsaicin cream to BL feet QID - Avoid use on damaged, broken, or irritated skin. Avoid occlusive dressing or heat   - Lidocaine patch to BL thighs   - PRN:; Oxycodone and Tylenol 650mg QID   -celebrex 100mg bid for sacral pain   - neuropathic pain b/l feet c/w   gabapentin    #Morbid obesity  - Most recent weight 394lbs (5/7)     #GI/Bowel Mgmt   - Pantoprazole  - Bisacodyl 10mg at HS   - Miralax   - Naloxegal 25mg daily     #/Bladder Mgmt   #ELLA  - USKB 5/20 negative   - Renvela 800mg TID    #FEN --Morbid obesity (BMI > 40).  - Diet - Regular + Thins  [CCHO]    - MVI     # Health Management:   Environmental challenge affecting dc plan--narrow door and need for ramp to house entrance  However, patient making goal directed effort towards therapy goals     #Precautions / PROPHYLAXIS:   - Falls  - ortho: Weight bearing status: WBAT in surgical shoe   - Lungs: Aspiration, Incentive Spirometer   - DVT: Lovenox  ----------------------------------------------      Function as at 6/2  Ambulating 35 ft  with RW wc follow through min assist (marked improvement)  Stand pivot transfers, bed to wc  Barrier--ulcers on feet, limiting wt bearing,  environmental barrier at home (stairs), severe obesity  Est dc revised to 6/13, issues with insurance, not approving FLORENCE      ----------------------------------------------  Dr. CANTU's Liaison with Family/Providers:  6/1- Met Patient's father during his visit to the unit during PM rounds, discssed review today, improved function, good pain control, appropriate use of offloading shoes  He is happy with this. Significant functional progress over last few days noted   The main challenge in DF strength, we will work on using ace wraps, and will get orthotics review for AFO if this persists    ----------------------------------------------  OUTPATIENT/FOLLOW UP:    Your Primary Care Provider,   Phone: (   )    -  Fax: (   )    -  Follow Up Time: 1 week    [facility],   [address] [place] [address]  Phone: [phone]  Fax: (   )    -  Follow Up Time:    Dr. Waterhouse  Podiatry  [phone]  Within 1 week       Spent 52 mins, patient review, discussion of labs and functional progress, update to family/girlfriend, care co ordination

## 2025-06-06 PROCEDURE — 99232 SBSQ HOSP IP/OBS MODERATE 35: CPT

## 2025-06-06 RX ORDER — OXYCODONE HYDROCHLORIDE 30 MG/1
15 TABLET ORAL
Refills: 0 | Status: DISCONTINUED | OUTPATIENT
Start: 2025-06-06 | End: 2025-06-11

## 2025-06-06 RX ORDER — OXYCODONE HYDROCHLORIDE 30 MG/1
20 TABLET ORAL EVERY 6 HOURS
Refills: 0 | Status: DISCONTINUED | OUTPATIENT
Start: 2025-06-06 | End: 2025-06-13

## 2025-06-06 RX ORDER — OXYCODONE HYDROCHLORIDE 30 MG/1
10 TABLET ORAL
Refills: 0 | Status: DISCONTINUED | OUTPATIENT
Start: 2025-06-06 | End: 2025-06-06

## 2025-06-06 RX ADMIN — Medication 220 MILLIGRAM(S): at 12:44

## 2025-06-06 RX ADMIN — Medication 1 APPLICATION(S): at 06:07

## 2025-06-06 RX ADMIN — Medication 1 APPLICATION(S): at 06:06

## 2025-06-06 RX ADMIN — TAMSULOSIN HYDROCHLORIDE 0.8 MILLIGRAM(S): 0.4 CAPSULE ORAL at 23:26

## 2025-06-06 RX ADMIN — Medication 1 APPLICATION(S): at 12:46

## 2025-06-06 RX ADMIN — Medication 400 MILLIGRAM(S): at 18:10

## 2025-06-06 RX ADMIN — OXYCODONE HYDROCHLORIDE 20 MILLIGRAM(S): 30 TABLET ORAL at 00:10

## 2025-06-06 RX ADMIN — CELECOXIB 100 MILLIGRAM(S): 50 CAPSULE ORAL at 07:03

## 2025-06-06 RX ADMIN — MUPIROCIN CALCIUM 1 APPLICATION(S): 20 CREAM TOPICAL at 18:11

## 2025-06-06 RX ADMIN — OXYCODONE HYDROCHLORIDE 15 MILLIGRAM(S): 30 TABLET ORAL at 18:52

## 2025-06-06 RX ADMIN — Medication 400 MILLIGRAM(S): at 12:43

## 2025-06-06 RX ADMIN — SEVELAMER HYDROCHLORIDE 800 MILLIGRAM(S): 800 TABLET ORAL at 08:56

## 2025-06-06 RX ADMIN — OXYCODONE HYDROCHLORIDE 15 MILLIGRAM(S): 30 TABLET ORAL at 18:09

## 2025-06-06 RX ADMIN — APIXABAN 5 MILLIGRAM(S): 2.5 TABLET, FILM COATED ORAL at 18:09

## 2025-06-06 RX ADMIN — OXYCODONE HYDROCHLORIDE 20 MILLIGRAM(S): 30 TABLET ORAL at 08:57

## 2025-06-06 RX ADMIN — Medication 1 APPLICATION(S): at 18:11

## 2025-06-06 RX ADMIN — SODIUM HYPOCHLORITE 1 APPLICATION(S): 0.12 SOLUTION TOPICAL at 06:04

## 2025-06-06 RX ADMIN — MUPIROCIN CALCIUM 1 APPLICATION(S): 20 CREAM TOPICAL at 06:03

## 2025-06-06 RX ADMIN — CELECOXIB 100 MILLIGRAM(S): 50 CAPSULE ORAL at 06:03

## 2025-06-06 RX ADMIN — NALOXEGOL OXALATE 25 MILLIGRAM(S): 12.5 TABLET, FILM COATED ORAL at 12:43

## 2025-06-06 RX ADMIN — SEVELAMER HYDROCHLORIDE 800 MILLIGRAM(S): 800 TABLET ORAL at 18:09

## 2025-06-06 RX ADMIN — Medication 9 MILLIGRAM(S): at 23:25

## 2025-06-06 RX ADMIN — GABAPENTIN 1200 MILLIGRAM(S): 400 CAPSULE ORAL at 23:24

## 2025-06-06 RX ADMIN — QUETIAPINE FUMARATE 25 MILLIGRAM(S): 25 TABLET ORAL at 23:25

## 2025-06-06 RX ADMIN — CELECOXIB 100 MILLIGRAM(S): 50 CAPSULE ORAL at 18:09

## 2025-06-06 RX ADMIN — METOPROLOL SUCCINATE 25 MILLIGRAM(S): 50 TABLET, EXTENDED RELEASE ORAL at 18:09

## 2025-06-06 RX ADMIN — GABAPENTIN 1200 MILLIGRAM(S): 400 CAPSULE ORAL at 14:18

## 2025-06-06 RX ADMIN — Medication 500 MILLIGRAM(S): at 12:44

## 2025-06-06 RX ADMIN — Medication 400 MILLIGRAM(S): at 08:56

## 2025-06-06 RX ADMIN — GABAPENTIN 1200 MILLIGRAM(S): 400 CAPSULE ORAL at 06:03

## 2025-06-06 RX ADMIN — SEVELAMER HYDROCHLORIDE 800 MILLIGRAM(S): 800 TABLET ORAL at 12:44

## 2025-06-06 RX ADMIN — SODIUM HYPOCHLORITE 1 APPLICATION(S): 0.12 SOLUTION TOPICAL at 18:12

## 2025-06-06 RX ADMIN — Medication 1 TABLET(S): at 12:43

## 2025-06-06 RX ADMIN — Medication 40 MILLIGRAM(S): at 06:04

## 2025-06-06 RX ADMIN — APIXABAN 5 MILLIGRAM(S): 2.5 TABLET, FILM COATED ORAL at 06:04

## 2025-06-06 RX ADMIN — OXYCODONE HYDROCHLORIDE 20 MILLIGRAM(S): 30 TABLET ORAL at 09:45

## 2025-06-06 NOTE — PROGRESS NOTE ADULT - ASSESSMENT
39 year old male with PMH of morbid obesity, BEA on CPAP, pAFIB (not on AC prior to admission), HTN, and BL papilledema attributed to pseudotumor cerebri; who initially presented to  on 2/24 with SOB and BL LE edema. Found to have URI with progressive SOB and multifocal PNA on imaging. His hospital course was complicated by worsening respiratory distress and increased 02 demands secondary to secretions (requiring multiple intubation attempts) and eventual MICU admission. While in MICU, he 02 requirements continued despite optimal vent management. Concern noted for progressive ARDS, unable to prone given morbid obesity, and ultimately cannulated for vvECMO on 2/25 and transferred to [facility] for further management on 2/26.  His hospital course at [facility] was significant for the following: diuretic and ABX management, s/p trache and PEG (placed on 3/7- PEG since removed), RCU admission, high grade fevers (secondary to panniculitis), progressively worsening sacral ulcer (likely osteomyelitis- s/p surgical debridement), BL foot wound (secondary to pressor use), neuropathy (secondary to critical illness polyneuropathy), ELLA (secondary to vancomycin toxicity and overdiuresis- since DCd- with improvement). Patient now admitted for a multidisciplinary rehab program. 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 Patient is ambulatory. Patient requires a custom molded hinged AFO to preserve ankle motion while stabilizing talar and subtalar joints. Device will give a more natural gait without destabilizing the knee and hip. Device requires a low density lining to protect prominent roderick areas of effected skin. Patient will need the device for a term of greater than 9 months. No reasonable prefabricated orthosis exists.     #BEA on BIPAP  --via nasal canula qhs    #Sacral Osteomyelitis  - Zosyn TID - completed on 5/12/25    #Paroxysmal AFIB  - Eliquis 5mg BID     #Calf Tenderness/pain b/l feet  - BL LE doppler negative 5/21   --Apply offloading boots when OOB      #L hip pain - resolved  - no acute findings on XR or CT   - L hip MRI (5/7) no significant findings     #HTN  - Amlodipine 10mg dialy     #Sleep/Mood  - Melatonin 9mg at HS   - Quetiapine 25mg at HS     #Skin  Wound Recs per Plastic Surgery (5/16):  Sacral Pressure Injury: BID- Cleanse with NS, remove excess fluid, apply santyl ointment to sloughing tissues, pack wound with 1-inch dry plain packing, cover with 4 inch gauze and abd combine  - R buttock: BID- Cleanse with NS, overlay calcium alginate, ABD combine dressing  Additional wound care instructions:  -->Right anterior thigh to groin isolated wound: Clean wound and periwound skin with NS. Pat dry. Apply liquid barrier film to periwound skin, allow to dry. Cover with silicone foam with border. Change daily or PRN if soiled   --> Bilateral abdominal pannus, bilateral groin: Cleanse with luke-warm soap and water, dry well. Apply clotrimazole  --> Bilateral feet: Apply betadine  to bilateral foot wounds followed by 4x4 gauze, ABD pad, and matilde daily per podiatry.  --> Continue to offload pressure; bariatric low airloss support surface, turn and position per protocol with use of fluidized positioning devices, continue incontinence and moisture care per protocol and use of single breathable incontinence pads, continue to offload heels with fluidized positioning devices. Continue use of bariatric seat cushion, limit sit time- no more than 2 consecutive hours at any given time.  - Pressure injury/Skin: OOB to Chair, PT/OT    - Ammonium lactate to BL LEs     #Neuropathy/sacral ulcer  - Gabapentin 1200mg TID --aim to taper to lower dose    #Pain Mgmt   - Capsaicin cream to BL feet QID - Avoid use on damaged, broken, or irritated skin. Avoid occlusive dressing or heat   - Lidocaine patch to BL thighs   - PRN:; Oxycodone and Tylenol 650mg QID   -celebrex 100mg bid for sacral pain   - neuropathic pain b/l feet c/w   gabapentin    #Morbid obesity  - Most recent weight 394lbs (5/7)     #GI/Bowel Mgmt   - Pantoprazole  - Bisacodyl 10mg at HS   - Miralax   - Naloxegal 25mg daily     #/Bladder Mgmt   #ELLA  - USKB 5/20 negative   - Renvela 800mg TID    #FEN --Morbid obesity (BMI > 40).  - Diet - Regular + Thins  [CCHO]    - MVI     # Health Management:   Environmental challenge affecting dc plan--narrow door and need for ramp to house entrance  However, patient making goal directed effort towards therapy goals     #Precautions / PROPHYLAXIS:   - Falls  - ortho: Weight bearing status: WBAT in surgical shoe   - Lungs: Aspiration, Incentive Spirometer   - DVT: Lovenox  ----------------------------------------------      Function as at 6/2  Ambulating 35 ft  with RW wc follow through min assist (marked improvement)  Stand pivot transfers, bed to wc  Barrier--ulcers on feet, limiting wt bearing,  environmental barrier at home (stairs), severe obesity  Est dc revised to 6/13, issues with insurance, not approving FLORENCE      ----------------------------------------------  Dr. CANTU's Liaison with Family/Providers:  6/1- Met Patient's father during his visit to the unit during PM rounds, discssed review today, improved function, good pain control, appropriate use of offloading shoes  He is happy with this. Significant functional progress over last few days noted   The main challenge in DF strength, we will work on using ace wraps, and will get orthotics review for AFO if this persists    ----------------------------------------------  OUTPATIENT/FOLLOW UP:    Your Primary Care Provider,   Phone: (   )    -  Fax: (   )    -  Follow Up Time: 1 week    [facility] [place],   [address] [place] [address]  Phone: [phone]  Fax: (   )    -  Follow Up Time:    Dr. Waterhouse  Podiatry  [phone]  Within 1 week       Spent 52 mins, patient review, discussion of labs and functional progress, update to family/girlfriend, care co ordination   39 year old male with PMH of morbid obesity, BEA on CPAP, pAFIB (not on AC prior to admission), HTN, and BL papilledema attributed to pseudotumor cerebri; who initially presented to  on 2/24 with SOB and BL LE edema. Found to have URI with progressive SOB and multifocal PNA on imaging. His hospital course was complicated by worsening respiratory distress and increased 02 demands secondary to secretions (requiring multiple intubation attempts) and eventual MICU admission. While in MICU, he 02 requirements continued despite optimal vent management. Concern noted for progressive ARDS, unable to prone given morbid obesity, and ultimately cannulated for vvECMO on 2/25 and transferred to [facility] for further management on 2/26.  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 Patient now admitted for a multidisciplinary rehab program. 04-25-25 @ 15:19      * Continued and remarkable functional improvement, reports pain b/l feet and calves attributes it to effect of increased ambulatory distance yesterday  * Persisting/significant DF weakness - await orthotic eval for BL AFOs     will leave Gabapentin dose as it is., considering recurrence of pain symptoms  * SW following for DC planning and arranging wound care teaching with family     # Critical illness myopathy due to respiratory failure and sepsis    - Gait Instability, ADL impairments and Functional impairments: start Comprehensive Rehab Program of PT/OT  - 3 hours a day, 5 days a week   - PRN: Nebulizer QID     # Persisting/significant DF weakness AFOs  - Orthotic eval for BL solid AFOs   - This patient has a diagnosis of BL foot drop. Patient is ambulatory. Patient requires a custom molded hinged AFO to preserve ankle motion while stabilizing talar and subtalar joints. Device will give a more natural gait without destabilizing the knee and hip. Device requires a low density lining to protect prominent roderick areas of effected skin. Patient will need the device for a term of greater than 9 months. No reasonable prefabricated orthosis exists.     #BEA on BIPAP  --via nasal canula qhs    #Sacral Osteomyelitis  - Zosyn TID - completed on 5/12/25    #Paroxysmal AFIB  - Eliquis 5mg BID     #Calf Tenderness/pain b/l feet  - BL LE doppler negative 5/21   --Apply offloading boots when OOB      #L hip pain - resolved  - no acute findings on XR or CT   - L hip MRI (5/7) no significant findings     #HTN  - Amlodipine 10mg dialy     #Sleep/Mood  - Melatonin 9mg at HS   - Quetiapine 25mg at HS     #Skin  Wound Recs per Plastic Surgery (5/16):  Sacral Pressure Injury: BID- Cleanse with NS, remove excess fluid, apply santyl ointment to sloughing tissues, pack wound with 1-inch dry plain packing, cover with 4 inch gauze and abd combine  - R buttock: BID- Cleanse with NS, overlay calcium alginate, ABD combine dressing  Additional wound care instructions:  -->Right anterior thigh to groin isolated wound: Clean wound and periwound skin with NS. Pat dry. Apply liquid barrier film to periwound skin, allow to dry. Cover with silicone foam with border. Change daily or PRN if soiled   --> Bilateral abdominal pannus, bilateral groin: Cleanse with luke-warm soap and water, dry well. Apply clotrimazole  --> Bilateral feet: Apply betadine  to bilateral foot wounds followed by 4x4 gauze, ABD pad, and matilde daily per podiatry.  --> Continue to offload pressure; bariatric low airloss support surface, turn and position per protocol with use of fluidized positioning devices, continue incontinence and moisture care per protocol and use of single breathable incontinence pads, continue to offload heels with fluidized positioning devices. Continue use of bariatric seat cushion, limit sit time- no more than 2 consecutive hours at any given time.  - Pressure injury/Skin: OOB to Chair, PT/OT    - Ammonium lactate to BL LEs     #Neuropathy/sacral ulcer  - Gabapentin 1200mg TID --aim to taper to lower dose    #Pain Mgmt   - Capsaicin cream to BL feet QID - Avoid use on damaged, broken, or irritated skin. Avoid occlusive dressing or heat   - Lidocaine patch to BL thighs   - PRN:; Oxycodone and Tylenol 650mg QID   -celebrex 100mg bid for sacral pain   - neuropathic pain b/l feet c/w   gabapentin    #Morbid obesity  - Most recent weight 394lbs (5/7)     #GI/Bowel Mgmt   - Pantoprazole  - Bisacodyl 10mg at HS   - Miralax   - Naloxegal 25mg daily     #/Bladder Mgmt   #ELLA  - USKB 5/20 negative   - Renvela 800mg TID    #FEN --Morbid obesity (BMI > 40).  - Diet - Regular + Thins  [CCHO]    - MVI     # Health Management:   Environmental challenge affecting dc plan--narrow door and need for ramp to house entrance  However, patient making goal directed effort towards therapy goals     #Precautions / PROPHYLAXIS:   - Falls  - ortho: Weight bearing status: WBAT in surgical shoe   - Lungs: Aspiration, Incentive Spirometer   - DVT: Lovenox  ----------------------------------------------      Function as at 6/2  Ambulating 35 ft  with RW wc follow through min assist (marked improvement)  Stand pivot transfers, bed to wc  Barrier--ulcers on feet, limiting wt bearing,  environmental barrier at home (stairs), severe obesity  Est dc revised to 6/13, issues with insurance, not approving FLORENCE      ----------------------------------------------  Dr. CANTU's Liaison with Family/Providers:  6/1- Met Patient's father during his visit to the unit during PM rounds, discssed review today, improved function, good pain control, appropriate use of offloading shoes  He is happy with this. Significant functional progress over last few days noted   The main challenge in DF strength, we will work on using ace wraps, and will get orthotics review for AFO if this persists    ----------------------------------------------  OUTPATIENT/FOLLOW UP:    Your Primary Care Provider,   Phone: (   )    -  Fax: (   )    -  Follow Up Time: 1 week    [facility] [place],   [address] [facility]  Phone: [phone]  Fax: (   )    -  Follow Up Time:    Dr. Waterhouse  Podiatry  [phone]  Within 1 week

## 2025-06-06 NOTE — PROGRESS NOTE ADULT - ASSESSMENT
39 year old male  with PMH of morbid obesity, BEA, pAFIB (not on AC), HTN, and BL papilledema attributed to pseudotumor cerebri; who initially presented to  on 2/24 with SOB and BL LE edema. Found to have URI with progressive SOB and multifocal PNA on imaging. His hospital course was complicated by worsening respiratory distress and increased O2 demands secondary to secretions (requiring multiple intubation attempts) and eventual MICU admission. While in MICU, his O2 requirements continued despite optimal vent management. Concern noted for progressive ARDS, unable to prone given morbid obesity, and ultimately cannulated for vvECMO on 2/25 and transferred to [facility] for further management on 2/26. His hospital course at [facility] was significant for the following: diuretic and ABX management, s/p trach and PEG (placed on 3/7- PEG since removed), RCU admission, high grade fevers (secondary to panniculitis), progressively worsening sacral ulcer (likely osteomyelitis- s/p surgical debridement), BL foot wound (secondary to pressor use), neuropathy (secondary to critical illness polyneuropathy), ELLA (secondary to vancomycin toxicity and overdiuresis- since DCd- with improvement). Patient now admitted for a multidisciplinary rehab program. 04-25-25     #Left hip pain - Improving  -Hip xray--> no fracture  -CT hip: Again seen is a sacral decubitus wound which extends superficial as well as involving the right gluteus musculature and the posterior L5 with small amount of fluid and gas within the tract. Limited involvement on this noncontrast CT. This likely corresponds to adjacent phlegmonous changes  -MRI left hip: Incompletely characterized sacral wound with associated ill-defined collection of fluid and soft tissue gas which extends to/involve the right gluteus zurdo muscle as at CT pelvis study from one day prior. No interval change in the high STIR signal within the bilateral gluteal and thigh musculature suggestive of a myositis as compared to prior MRI study. No fracture, osseous destruction or aggressive periosteal reaction. No osteomyelitis at the left hip. Edema-like signal along the right and left greater trochanteric regions, overall increased as compared to the prior MRI study from 4/11/2025. Findings are felt to be reactive in etiology.  -Continue comprehensive rehab program - PT/OT/SLP per rehab team  -Pain management, bowel regimen per rehab     #Abnormal CT A/P  -5/2: CT A/P: Question of pneumatosis in the cecum with no other evidence of bowel ischemia. Correlation with lactate levels and clinical exam would be helpful.  -Patient has no symptoms. Abdomen is benign. Tolerating PO  -5/2: Surgery cx noted: no intervention. c/w PO. refer to bariatric surgery post-DC dr botello at [place] or Dr. Segovia at [facility] [per patient request]  -5/3 GI cx noted: no GI intervention planned. c/w PO    #Stage IV Sacral Decub Ulcer  #Bilateral Buttocks Wounds  #Bilateral Foot Wounds  -5/2 CT A/P: An air and fluid containing collection in the right gluteus zurdo muscle in continuity with a subcutaneous sacral collection. No obvious skin defect to suggest a decubitus ulcer.  -5/7: MRI left hip: Incompletely characterized sacral wound with associated ill-defined collection of fluid and soft tissue gas which extends to/involve the right gluteus zurdo muscle as at CT pelvis study from one day prior. No interval change in the high STIR signal within the bilateral gluteal and thigh musculature suggestive of a myositis as compared to prior MRI study. No fracture, osseous destruction or aggressive periosteal reaction. No osteomyelitis at the left hip. Edema-like signal along the right and left greater trochanteric regions, overall increased as compared to the prior MRI study from 4/11/2025. Findings are felt to be reactive in etiology..  -Continue local wound care  -MVI, vitamin C and zinc   -Off load pressure  -Plastic consult following  -ID follow up noted and appreciated  -Per plastics - continue current management    #Fungemia 2/2 Panniculitis - resolved  -Blood culture 3/15, 3/16 with candida albicans, s/p course of antifungals   -Repeat cultures negative since 3/18    #Debility Secondary to Acute Hypoxic Respiratory Failure/ARDS 2/2 PNA  #BEA (Not on CPAP)  #Critical Illness Polyneuropathy   -s/p VV ECMO, s/p diuresis, TTE/ROBERT with RV failure, s/p treatment for pneumonia  -Repeat Echo 3/11 showed EF 66 %. RV is not well visualized, enlarged RV cavity size a/ reduced RV systolic function. No significant valvular disease.  No echocardiographic evidence of pulmonary hypertension  -s/p Trach (decannulated 3/28) and PEG (removed 4/15)   -Saturating well on RA. Continue duoneb prn  -BIPAP qhs (FiO2 40%, 18/10) via nasal mask   -Fall precaution  -Pain control per rehab team     #p-AFIB  #Sinus Tachycardia (Resolved)  -Patient has intermittent tachycardia. Per patient, His HR  mostly above 110 even when he is not sick. follows with cardio OP  -5/3: EKG: NSR@94 bpm  -Eliquis   -Off norvasc  -Continue metoprolol, reduced dose to 25mg BID given soft BP (6/4)  -TSH WNL 2/25  -Sinus tachycardia likely multifactorial including pain and deconditioning. Low suspicion for PE, no SOB/hypoxia and he is already on full AC    #RIJ and Bilateral LE DVT  -Duplex RUE 3/14 showed Acute, non-occlusive deep vein thrombosis noted within the right internal jugular vein. Superficial vein thrombosis noted within the right antecubital cephalic vein  -Duplex LE 3/14 showed Acute, occlusive deep vein thromboses noted within the right and left peroneal veins at both mid calves. Age-indeterminate, occlusive deep vein thrombosis visualized within the left gastrocnemius vein at the proximal calf.  -Continue eliquis  -Repeat duplex 4/29 showed No evidence of deep venous thrombosis in either lower extremity.    #Normocytic Anemia - Likely multifactorial  -H/H stable, no evidence of active bleed   -Monitor CBC    #HTN  -Amlodipine d/melly  -Continue Metoprolol, reduced dose to 25mg BID with holding parameters  -Monitor BP     #History of Pseudotumor Cerebri   -Outpatient follow up     #ELLA - resolved  -Baseline Cr appears to be ~0.8 range   -ELLA felt to be multifactorial in setting of cardiorenal w/ RVE, recurrent ELLA suspected due to vancomycin toxicity and diuresis   -Continue to encourage oral hydration   -Renvela   -Low phos diet  -Monitor BMP and phos level     #Type 2 Diabetes, HbA1C 4.9 (4/28)  -FS wnl. Monitor glucose on routine lab, controlled     #Urinary Retention  -Flomax 5/1  -Renal u/s 5/20 unremarkable    #Mood  -Continue Seroquel    #DVT ppx - Eliquis  #GI ppx - PPI    Discussed with rehab team

## 2025-06-06 NOTE — PROGRESS NOTE ADULT - SUBJECTIVE AND OBJECTIVE BOX
Interval History  Patient seen and examined at bedside. No acute events noted.  Endorses LE neuropathic pain last night, has been ambulating more with therapy. No other complaints.    ALLERGIES:  No Known Allergies    MEDICATIONS  (STANDING):  ammonium lactate 12% Lotion 1 Application(s) Topical two times a day  apixaban 5 milliGRAM(s) Oral two times a day  ascorbic acid 500 milliGRAM(s) Oral daily  bisacodyl 10 milliGRAM(s) Oral at bedtime  capsaicin 0.025% Cream 1 Application(s) Topical four times a day  celecoxib 100 milliGRAM(s) Oral two times a day  clotrimazole 1% Cream 1 Application(s) Topical <User Schedule>  collagenase Ointment 1 Application(s) Topical two times a day  Dakins Solution - 1/4 Strength 1 Application(s) Topical two times a day  dextrose 5%. 1000 milliLiter(s) (50 mL/Hr) IV Continuous <Continuous>  dextrose 5%. 1000 milliLiter(s) (100 mL/Hr) IV Continuous <Continuous>  dextrose 50% Injectable 25 Gram(s) IV Push once  dextrose 50% Injectable 12.5 Gram(s) IV Push once  dextrose 50% Injectable 25 Gram(s) IV Push once  gabapentin 1200 milliGRAM(s) Oral every 8 hours  glucagon  Injectable 1 milliGRAM(s) IntraMuscular once  influenza   Vaccine 0.5 milliLiter(s) IntraMuscular once  lidocaine   4% Patch 1 Patch Transdermal daily  lidocaine   4% Patch 1 Patch Transdermal daily  magnesium oxide 400 milliGRAM(s) Oral three times a day with meals  melatonin 9 milliGRAM(s) Oral at bedtime  metoprolol tartrate 25 milliGRAM(s) Oral two times a day  multivitamin 1 Tablet(s) Oral daily  mupirocin 2% Ointment 1 Application(s) Topical two times a day  naloxegol 25 milliGRAM(s) Oral daily  pantoprazole    Tablet 40 milliGRAM(s) Oral before breakfast  petrolatum white Ointment 1 Application(s) Topical every 8 hours  polyethylene glycol 3350 17 Gram(s) Oral two times a day  povidone iodine 10% Solution 1 Application(s) Topical daily  QUEtiapine 25 milliGRAM(s) Oral at bedtime  sevelamer carbonate 800 milliGRAM(s) Oral three times a day with meals  silver nitrate Applicator 6 Application(s) Topical once  tamsulosin 0.8 milliGRAM(s) Oral at bedtime  zinc sulfate 220 milliGRAM(s) Oral daily    MEDICATIONS  (PRN):  albuterol/ipratropium for Nebulization 3 milliLiter(s) Nebulizer every 6 hours PRN Shortness of Breath and/or Wheezing  dextrose Oral Gel 15 Gram(s) Oral once PRN Blood Glucose LESS THAN 70 milliGRAM(s)/deciliter  oxyCODONE    IR 20 milliGRAM(s) Oral every 6 hours PRN Severe Pain (7 - 10)  oxyCODONE    IR 15 milliGRAM(s) Oral two times a day PRN Moderate Pain (4 - 6)  oxyCODONE    IR 10 milliGRAM(s) Oral two times a day PRN Mild Pain (1 - 3)    Vital Signs Last 24 Hrs  T(F): 98.1 (06 Jun 2025 08:43), Max: 98.6 (05 Jun 2025 23:00)  HR: 83 (06 Jun 2025 08:43) (83 - 112)  BP: 108/71 (06 Jun 2025 08:43) (100/66 - 108/71)  RR: 16 (06 Jun 2025 08:43) (16 - 16)  SpO2: 97% (06 Jun 2025 08:43) (93% - 97%)  I&O's Summary    PHYSICAL EXAM:  GENERAL: NAD  HEENT: NCAT  CHEST/LUNG: Clear to percussion bilaterally; No rales, rhonchi, wheezing  HEART: Regular rate and rhythm  ABDOMEN: Soft, Nontender, Nondistended; Bowel sounds present  MUSCULOSKELETAL/EXTREMITIES: 2+ pulses, no LE edema   PSYCH: Appropriate affect  NEURO: Alert & Oriented x 3    I personally reviewed the below data/images/labs:    LABS:                        8.9    6.19  )-----------( 441      ( 05 Jun 2025 05:50 )             30.2       06-05    142  |  106  |  13  ----------------------------<  108  4.0   |  31  |  1.14    Ca    9.2      05 Jun 2025 05:50  Phos  5.8     06-05    TPro  6.9  /  Alb  2.0  /  TBili  0.3  /  DBili  x   /  AST  24  /  ALT  19  /  AlkPhos  63  06-05    Urinalysis Basic - ( 05 Jun 2025 05:50 )    Color: x / Appearance: x / SG: x / pH: x  Gluc: 108 mg/dL / Ketone: x  / Bili: x / Urobili: x   Blood: x / Protein: x / Nitrite: x   Leuk Esterase: x / RBC: x / WBC x   Sq Epi: x / Non Sq Epi: x / Bacteria: x    Consultant(s) Notes Reviewed:   Care Discused with Consultants/Other Providers:  Imaging Personally Reviewed:

## 2025-06-06 NOTE — PROGRESS NOTE ADULT - SUBJECTIVE AND OBJECTIVE BOX
Subjective  Patient seen and examined this AM with therapy.   Father present during encounter.  Admits to sleeping well. Significantly improved ROM, and overall function  Sacral pain resolved, sacral wound without bleed for a few days now.   Discussed need for wound care teaching with family.     ROS--no acute pain, no fever or chest pain   B/l leg and sacral ulcers,  LBM 6/5    Function -  -Ambulated 75' x 3 trials with bariatric RW needing CG/MIN A x 1 and wheelchair  follow. Progressed to reciprocal gait pattern utilizing less energy expenditure  with walking. Steppage gait pattern used today while wearing sneakers due to  weak ankle DF/foot drop.    Vital Signs Last 24 Hrs  T(C): 36.7 (06 Jun 2025 08:43), Max: 37 (05 Jun 2025 23:00)  T(F): 98.1 (06 Jun 2025 08:43), Max: 98.6 (05 Jun 2025 23:00)  HR: 83 (06 Jun 2025 08:43) (83 - 112)  BP: 108/71 (06 Jun 2025 08:43) (100/66 - 108/71)  BP(mean): --  RR: 16 (06 Jun 2025 08:43) (16 - 16)  SpO2: 97% (06 Jun 2025 08:43) (93% - 97%)    EXAM  Gen - Comfortable, cheerful, motivated   HEENT - NAD  Neck - Supple, No limited ROM  Pulm - Clear  Cardiovascular - RRR, S1S2, No murmurs  Abdomen - Soft, non tender, +BS  Extremities - chronic skin changes with hyperpigmentation at shin, callus on feet, responding to treatment   Ulcer on both feet examined, healthy scab noted     Neuro-  AAO X 3, Speech is normal     Motor - UE 5/5 Hip flexion 3/5, Knee 4/5 PF 4/5, DF 2/5     Sensory - Decreased to light touch bilateral lower extremities      Coordination - impaired lower extremities   Psychiatric - Mood stable, Affect WNL    Skin:  R foot plantar aspect lateral/distal side 7cm x 5.5cm ischemic wound from pressors  RLE 4th digit 1x1.5cm ischemic induced wound from pressors  RLE 5th digit 3x2cm ischemic induced wound from pressors  L foot plantar aspect lateral/distal side 6x7cm ischemic induced wound from pressor usage  LLE 4th digit 2x1cm ischemic induced wound from pressors  LLE 5th digit 2.5x3cm ischemic induced wound from pressors   R groin skin tear 1x1.5cm  Unstageable pressure wound cocyx 7cmx4.5cm w/ 2.5cm depth  R buttock pressure induced wound semi contiguous with coccyx wound calling unstageable given prior debridement 8.5x5.5cm  L buttock 1.0x0.5cm stage 3 pressure injury  R buttock skin tear 3.5cm x 0.5cm and 1.5cmx0.5cm  Dry skin with callus on sole of foot     MMS--antigravity upper extremities,    improved strength    RECENT LABS/IMAGING                        8.9    6.19  )-----------( 441      ( 05 Jun 2025 05:50 )             30.2     06-05    142  |  106  |  13  ----------------------------<  108[H]  4.0   |  31  |  1.14    Ca    9.2      05 Jun 2025 05:50  Phos  5.8     06-05    TPro  6.9  /  Alb  2.0[L]  /  TBili  0.3  /  DBili  x   /  AST  24  /  ALT  19  /  AlkPhos  63  06-05      Urinalysis Basic - ( 05 Jun 2025 05:50 )    Color: x / Appearance: x / SG: x / pH: x  Gluc: 108 mg/dL / Ketone: x  / Bili: x / Urobili: x   Blood: x / Protein: x / Nitrite: x   Leuk Esterase: x / RBC: x / WBC x   Sq Epi: x / Non Sq Epi: x / Bacteria: x        MEDICATIONS  (STANDING):  ammonium lactate 12% Lotion 1 Application(s) Topical two times a day  apixaban 5 milliGRAM(s) Oral two times a day  ascorbic acid 500 milliGRAM(s) Oral daily  bisacodyl 10 milliGRAM(s) Oral at bedtime  capsaicin 0.025% Cream 1 Application(s) Topical four times a day  celecoxib 100 milliGRAM(s) Oral two times a day  clotrimazole 1% Cream 1 Application(s) Topical <User Schedule>  collagenase Ointment 1 Application(s) Topical two times a day  Dakins Solution - 1/4 Strength 1 Application(s) Topical two times a day  dextrose 5%. 1000 milliLiter(s) (50 mL/Hr) IV Continuous <Continuous>  dextrose 5%. 1000 milliLiter(s) (100 mL/Hr) IV Continuous <Continuous>  dextrose 50% Injectable 25 Gram(s) IV Push once  dextrose 50% Injectable 12.5 Gram(s) IV Push once  dextrose 50% Injectable 25 Gram(s) IV Push once  gabapentin 1200 milliGRAM(s) Oral every 8 hours  glucagon  Injectable 1 milliGRAM(s) IntraMuscular once  influenza   Vaccine 0.5 milliLiter(s) IntraMuscular once  lidocaine   4% Patch 1 Patch Transdermal daily  lidocaine   4% Patch 1 Patch Transdermal daily  magnesium oxide 400 milliGRAM(s) Oral three times a day with meals  melatonin 9 milliGRAM(s) Oral at bedtime  metoprolol tartrate 25 milliGRAM(s) Oral two times a day  multivitamin 1 Tablet(s) Oral daily  mupirocin 2% Ointment 1 Application(s) Topical two times a day  naloxegol 25 milliGRAM(s) Oral daily  pantoprazole    Tablet 40 milliGRAM(s) Oral before breakfast  petrolatum white Ointment 1 Application(s) Topical every 8 hours  polyethylene glycol 3350 17 Gram(s) Oral two times a day  povidone iodine 10% Solution 1 Application(s) Topical daily  QUEtiapine 25 milliGRAM(s) Oral at bedtime  sevelamer carbonate 800 milliGRAM(s) Oral three times a day with meals  silver nitrate Applicator 6 Application(s) Topical once  tamsulosin 0.8 milliGRAM(s) Oral at bedtime  zinc sulfate 220 milliGRAM(s) Oral daily    MEDICATIONS  (PRN):  albuterol/ipratropium for Nebulization 3 milliLiter(s) Nebulizer every 6 hours PRN Shortness of Breath and/or Wheezing  dextrose Oral Gel 15 Gram(s) Oral once PRN Blood Glucose LESS THAN 70 milliGRAM(s)/deciliter  oxyCODONE    IR 20 milliGRAM(s) Oral every 6 hours PRN Severe Pain (7 - 10)  oxyCODONE    IR 15 milliGRAM(s) Oral two times a day PRN Moderate Pain (4 - 6)  oxyCODONE    IR 10 milliGRAM(s) Oral two times a day PRN Mild Pain (1 - 3)

## 2025-06-07 PROCEDURE — 99231 SBSQ HOSP IP/OBS SF/LOW 25: CPT

## 2025-06-07 RX ADMIN — METOPROLOL SUCCINATE 25 MILLIGRAM(S): 50 TABLET, EXTENDED RELEASE ORAL at 18:09

## 2025-06-07 RX ADMIN — Medication 1 APPLICATION(S): at 06:50

## 2025-06-07 RX ADMIN — OXYCODONE HYDROCHLORIDE 15 MILLIGRAM(S): 30 TABLET ORAL at 18:58

## 2025-06-07 RX ADMIN — APIXABAN 5 MILLIGRAM(S): 2.5 TABLET, FILM COATED ORAL at 06:49

## 2025-06-07 RX ADMIN — Medication 9 MILLIGRAM(S): at 22:44

## 2025-06-07 RX ADMIN — TAMSULOSIN HYDROCHLORIDE 0.8 MILLIGRAM(S): 0.4 CAPSULE ORAL at 22:45

## 2025-06-07 RX ADMIN — OXYCODONE HYDROCHLORIDE 15 MILLIGRAM(S): 30 TABLET ORAL at 18:08

## 2025-06-07 RX ADMIN — Medication 500 MILLIGRAM(S): at 13:44

## 2025-06-07 RX ADMIN — OXYCODONE HYDROCHLORIDE 20 MILLIGRAM(S): 30 TABLET ORAL at 09:25

## 2025-06-07 RX ADMIN — OXYCODONE HYDROCHLORIDE 20 MILLIGRAM(S): 30 TABLET ORAL at 00:25

## 2025-06-07 RX ADMIN — OXYCODONE HYDROCHLORIDE 20 MILLIGRAM(S): 30 TABLET ORAL at 10:15

## 2025-06-07 RX ADMIN — METOPROLOL SUCCINATE 25 MILLIGRAM(S): 50 TABLET, EXTENDED RELEASE ORAL at 06:50

## 2025-06-07 RX ADMIN — Medication 400 MILLIGRAM(S): at 18:09

## 2025-06-07 RX ADMIN — Medication 1 APPLICATION(S): at 13:46

## 2025-06-07 RX ADMIN — CELECOXIB 100 MILLIGRAM(S): 50 CAPSULE ORAL at 18:08

## 2025-06-07 RX ADMIN — Medication 1 TABLET(S): at 13:43

## 2025-06-07 RX ADMIN — Medication 220 MILLIGRAM(S): at 13:44

## 2025-06-07 RX ADMIN — QUETIAPINE FUMARATE 25 MILLIGRAM(S): 25 TABLET ORAL at 22:44

## 2025-06-07 RX ADMIN — SODIUM HYPOCHLORITE 1 APPLICATION(S): 0.12 SOLUTION TOPICAL at 06:49

## 2025-06-07 RX ADMIN — Medication 400 MILLIGRAM(S): at 13:44

## 2025-06-07 RX ADMIN — Medication 40 MILLIGRAM(S): at 06:49

## 2025-06-07 RX ADMIN — APIXABAN 5 MILLIGRAM(S): 2.5 TABLET, FILM COATED ORAL at 18:09

## 2025-06-07 RX ADMIN — MUPIROCIN CALCIUM 1 APPLICATION(S): 20 CREAM TOPICAL at 18:10

## 2025-06-07 RX ADMIN — GABAPENTIN 1200 MILLIGRAM(S): 400 CAPSULE ORAL at 13:43

## 2025-06-07 RX ADMIN — Medication 400 MILLIGRAM(S): at 09:19

## 2025-06-07 RX ADMIN — SEVELAMER HYDROCHLORIDE 800 MILLIGRAM(S): 800 TABLET ORAL at 18:09

## 2025-06-07 RX ADMIN — GABAPENTIN 1200 MILLIGRAM(S): 400 CAPSULE ORAL at 06:48

## 2025-06-07 RX ADMIN — CELECOXIB 100 MILLIGRAM(S): 50 CAPSULE ORAL at 06:50

## 2025-06-07 RX ADMIN — GABAPENTIN 1200 MILLIGRAM(S): 400 CAPSULE ORAL at 22:43

## 2025-06-07 RX ADMIN — SEVELAMER HYDROCHLORIDE 800 MILLIGRAM(S): 800 TABLET ORAL at 09:19

## 2025-06-07 RX ADMIN — Medication 1 APPLICATION(S): at 18:10

## 2025-06-07 RX ADMIN — SODIUM HYPOCHLORITE 1 APPLICATION(S): 0.12 SOLUTION TOPICAL at 19:04

## 2025-06-07 RX ADMIN — OXYCODONE HYDROCHLORIDE 20 MILLIGRAM(S): 30 TABLET ORAL at 01:25

## 2025-06-07 RX ADMIN — NALOXEGOL OXALATE 25 MILLIGRAM(S): 12.5 TABLET, FILM COATED ORAL at 13:43

## 2025-06-07 RX ADMIN — Medication 1 APPLICATION(S): at 18:09

## 2025-06-07 RX ADMIN — SEVELAMER HYDROCHLORIDE 800 MILLIGRAM(S): 800 TABLET ORAL at 13:43

## 2025-06-07 NOTE — PROGRESS NOTE ADULT - NS ATTEST RISK PROBLEM GEN_ALL_CORE FT
Low number or complexity of illness, Low amount/complexity of data reviewed, and Low risk of complications/morbidity from additional treatment. (2 out of 3 element)

## 2025-06-07 NOTE — PROGRESS NOTE ADULT - ASSESSMENT
Imp: Patient with diagnosis of Critical Illness Myopathy admitted for comprehensive acute rehabilitation.    Plan:  - Continue current multidisciplinary rehab program  - DVT prophylaxis  - Skin- Turn q2h, check skin daily  - Continue current medications; patient medically stable.   -Active issues-     ## Constipation: on dulocolax, miralax,and naloxegal. -- BM yesterday,. will continue to monitor    - Patient is stable to continue current rehabilitation program.

## 2025-06-08 RX ADMIN — Medication 1 APPLICATION(S): at 13:34

## 2025-06-08 RX ADMIN — OXYCODONE HYDROCHLORIDE 20 MILLIGRAM(S): 30 TABLET ORAL at 10:08

## 2025-06-08 RX ADMIN — SODIUM HYPOCHLORITE 1 APPLICATION(S): 0.12 SOLUTION TOPICAL at 17:37

## 2025-06-08 RX ADMIN — Medication 1 TABLET(S): at 13:31

## 2025-06-08 RX ADMIN — Medication 9 MILLIGRAM(S): at 22:42

## 2025-06-08 RX ADMIN — APIXABAN 5 MILLIGRAM(S): 2.5 TABLET, FILM COATED ORAL at 17:27

## 2025-06-08 RX ADMIN — Medication 1 APPLICATION(S): at 17:37

## 2025-06-08 RX ADMIN — Medication 40 MILLIGRAM(S): at 06:44

## 2025-06-08 RX ADMIN — Medication 1 APPLICATION(S): at 17:38

## 2025-06-08 RX ADMIN — SEVELAMER HYDROCHLORIDE 800 MILLIGRAM(S): 800 TABLET ORAL at 17:26

## 2025-06-08 RX ADMIN — GABAPENTIN 1200 MILLIGRAM(S): 400 CAPSULE ORAL at 13:32

## 2025-06-08 RX ADMIN — NALOXEGOL OXALATE 25 MILLIGRAM(S): 12.5 TABLET, FILM COATED ORAL at 13:31

## 2025-06-08 RX ADMIN — OXYCODONE HYDROCHLORIDE 20 MILLIGRAM(S): 30 TABLET ORAL at 11:04

## 2025-06-08 RX ADMIN — OXYCODONE HYDROCHLORIDE 20 MILLIGRAM(S): 30 TABLET ORAL at 23:09

## 2025-06-08 RX ADMIN — APIXABAN 5 MILLIGRAM(S): 2.5 TABLET, FILM COATED ORAL at 06:41

## 2025-06-08 RX ADMIN — METOPROLOL SUCCINATE 25 MILLIGRAM(S): 50 TABLET, EXTENDED RELEASE ORAL at 17:26

## 2025-06-08 RX ADMIN — CELECOXIB 100 MILLIGRAM(S): 50 CAPSULE ORAL at 17:26

## 2025-06-08 RX ADMIN — SODIUM HYPOCHLORITE 1 APPLICATION(S): 0.12 SOLUTION TOPICAL at 06:48

## 2025-06-08 RX ADMIN — GABAPENTIN 1200 MILLIGRAM(S): 400 CAPSULE ORAL at 06:40

## 2025-06-08 RX ADMIN — OXYCODONE HYDROCHLORIDE 20 MILLIGRAM(S): 30 TABLET ORAL at 00:14

## 2025-06-08 RX ADMIN — Medication 400 MILLIGRAM(S): at 10:08

## 2025-06-08 RX ADMIN — Medication 400 MILLIGRAM(S): at 17:27

## 2025-06-08 RX ADMIN — OXYCODONE HYDROCHLORIDE 15 MILLIGRAM(S): 30 TABLET ORAL at 17:26

## 2025-06-08 RX ADMIN — CELECOXIB 100 MILLIGRAM(S): 50 CAPSULE ORAL at 07:41

## 2025-06-08 RX ADMIN — GABAPENTIN 1200 MILLIGRAM(S): 400 CAPSULE ORAL at 22:42

## 2025-06-08 RX ADMIN — SEVELAMER HYDROCHLORIDE 800 MILLIGRAM(S): 800 TABLET ORAL at 13:31

## 2025-06-08 RX ADMIN — MUPIROCIN CALCIUM 1 APPLICATION(S): 20 CREAM TOPICAL at 17:29

## 2025-06-08 RX ADMIN — QUETIAPINE FUMARATE 25 MILLIGRAM(S): 25 TABLET ORAL at 22:42

## 2025-06-08 RX ADMIN — OXYCODONE HYDROCHLORIDE 20 MILLIGRAM(S): 30 TABLET ORAL at 01:14

## 2025-06-08 RX ADMIN — SEVELAMER HYDROCHLORIDE 800 MILLIGRAM(S): 800 TABLET ORAL at 10:08

## 2025-06-08 RX ADMIN — Medication 500 MILLIGRAM(S): at 13:32

## 2025-06-08 RX ADMIN — TAMSULOSIN HYDROCHLORIDE 0.8 MILLIGRAM(S): 0.4 CAPSULE ORAL at 22:43

## 2025-06-08 RX ADMIN — Medication 1 APPLICATION(S): at 06:43

## 2025-06-08 RX ADMIN — OXYCODONE HYDROCHLORIDE 15 MILLIGRAM(S): 30 TABLET ORAL at 18:20

## 2025-06-08 RX ADMIN — Medication 220 MILLIGRAM(S): at 13:31

## 2025-06-08 RX ADMIN — CELECOXIB 100 MILLIGRAM(S): 50 CAPSULE ORAL at 06:41

## 2025-06-08 RX ADMIN — Medication 400 MILLIGRAM(S): at 13:31

## 2025-06-09 LAB
ALBUMIN SERPL ELPH-MCNC: 2.2 G/DL — LOW (ref 3.3–5)
ALP SERPL-CCNC: 58 U/L — SIGNIFICANT CHANGE UP (ref 40–120)
ALT FLD-CCNC: 19 U/L — SIGNIFICANT CHANGE UP (ref 10–45)
ANION GAP SERPL CALC-SCNC: 8 MMOL/L — SIGNIFICANT CHANGE UP (ref 5–17)
AST SERPL-CCNC: 22 U/L — SIGNIFICANT CHANGE UP (ref 10–40)
BASOPHILS # BLD AUTO: 0.03 K/UL — SIGNIFICANT CHANGE UP (ref 0–0.2)
BASOPHILS NFR BLD AUTO: 0.5 % — SIGNIFICANT CHANGE UP (ref 0–2)
BILIRUB SERPL-MCNC: 0.3 MG/DL — SIGNIFICANT CHANGE UP (ref 0.2–1.2)
BUN SERPL-MCNC: 13 MG/DL — SIGNIFICANT CHANGE UP (ref 7–23)
CALCIUM SERPL-MCNC: 9.3 MG/DL — SIGNIFICANT CHANGE UP (ref 8.4–10.5)
CHLORIDE SERPL-SCNC: 105 MMOL/L — SIGNIFICANT CHANGE UP (ref 96–108)
CO2 SERPL-SCNC: 31 MMOL/L — SIGNIFICANT CHANGE UP (ref 22–31)
CREAT SERPL-MCNC: 1.18 MG/DL — SIGNIFICANT CHANGE UP (ref 0.5–1.3)
EGFR: 82 ML/MIN/1.73M2 — SIGNIFICANT CHANGE UP
EGFR: 82 ML/MIN/1.73M2 — SIGNIFICANT CHANGE UP
EOSINOPHIL # BLD AUTO: 0.11 K/UL — SIGNIFICANT CHANGE UP (ref 0–0.5)
EOSINOPHIL NFR BLD AUTO: 1.9 % — SIGNIFICANT CHANGE UP (ref 0–6)
GLUCOSE SERPL-MCNC: 96 MG/DL — SIGNIFICANT CHANGE UP (ref 70–99)
HCT VFR BLD CALC: 32.6 % — LOW (ref 39–50)
HGB BLD-MCNC: 9.5 G/DL — LOW (ref 13–17)
IMM GRANULOCYTES NFR BLD AUTO: 0.2 % — SIGNIFICANT CHANGE UP (ref 0–0.9)
LYMPHOCYTES # BLD AUTO: 1.91 K/UL — SIGNIFICANT CHANGE UP (ref 1–3.3)
LYMPHOCYTES # BLD AUTO: 32.6 % — SIGNIFICANT CHANGE UP (ref 13–44)
MCHC RBC-ENTMCNC: 25 PG — LOW (ref 27–34)
MCHC RBC-ENTMCNC: 29.1 G/DL — LOW (ref 32–36)
MCV RBC AUTO: 85.8 FL — SIGNIFICANT CHANGE UP (ref 80–100)
MONOCYTES # BLD AUTO: 0.82 K/UL — SIGNIFICANT CHANGE UP (ref 0–0.9)
MONOCYTES NFR BLD AUTO: 14 % — SIGNIFICANT CHANGE UP (ref 2–14)
NEUTROPHILS # BLD AUTO: 2.98 K/UL — SIGNIFICANT CHANGE UP (ref 1.8–7.4)
NEUTROPHILS NFR BLD AUTO: 50.8 % — SIGNIFICANT CHANGE UP (ref 43–77)
NRBC BLD AUTO-RTO: 0 /100 WBCS — SIGNIFICANT CHANGE UP (ref 0–0)
PLATELET # BLD AUTO: 400 K/UL — SIGNIFICANT CHANGE UP (ref 150–400)
POTASSIUM SERPL-MCNC: 4 MMOL/L — SIGNIFICANT CHANGE UP (ref 3.5–5.3)
POTASSIUM SERPL-SCNC: 4 MMOL/L — SIGNIFICANT CHANGE UP (ref 3.5–5.3)
PROT SERPL-MCNC: 7.3 G/DL — SIGNIFICANT CHANGE UP (ref 6–8.3)
RBC # BLD: 3.8 M/UL — LOW (ref 4.2–5.8)
RBC # FLD: 18.7 % — HIGH (ref 10.3–14.5)
SODIUM SERPL-SCNC: 144 MMOL/L — SIGNIFICANT CHANGE UP (ref 135–145)
WBC # BLD: 5.86 K/UL — SIGNIFICANT CHANGE UP (ref 3.8–10.5)
WBC # FLD AUTO: 5.86 K/UL — SIGNIFICANT CHANGE UP (ref 3.8–10.5)

## 2025-06-09 PROCEDURE — 99233 SBSQ HOSP IP/OBS HIGH 50: CPT

## 2025-06-09 RX ADMIN — OXYCODONE HYDROCHLORIDE 15 MILLIGRAM(S): 30 TABLET ORAL at 19:00

## 2025-06-09 RX ADMIN — SEVELAMER HYDROCHLORIDE 800 MILLIGRAM(S): 800 TABLET ORAL at 18:01

## 2025-06-09 RX ADMIN — Medication 500 MILLIGRAM(S): at 12:56

## 2025-06-09 RX ADMIN — Medication 400 MILLIGRAM(S): at 08:52

## 2025-06-09 RX ADMIN — APIXABAN 5 MILLIGRAM(S): 2.5 TABLET, FILM COATED ORAL at 18:01

## 2025-06-09 RX ADMIN — Medication 1 APPLICATION(S): at 12:57

## 2025-06-09 RX ADMIN — MUPIROCIN CALCIUM 1 APPLICATION(S): 20 CREAM TOPICAL at 18:04

## 2025-06-09 RX ADMIN — SODIUM HYPOCHLORITE 1 APPLICATION(S): 0.12 SOLUTION TOPICAL at 06:54

## 2025-06-09 RX ADMIN — Medication 1 APPLICATION(S): at 18:02

## 2025-06-09 RX ADMIN — METOPROLOL SUCCINATE 25 MILLIGRAM(S): 50 TABLET, EXTENDED RELEASE ORAL at 18:03

## 2025-06-09 RX ADMIN — Medication 220 MILLIGRAM(S): at 12:56

## 2025-06-09 RX ADMIN — SODIUM HYPOCHLORITE 1 APPLICATION(S): 0.12 SOLUTION TOPICAL at 18:02

## 2025-06-09 RX ADMIN — OXYCODONE HYDROCHLORIDE 20 MILLIGRAM(S): 30 TABLET ORAL at 00:09

## 2025-06-09 RX ADMIN — CELECOXIB 100 MILLIGRAM(S): 50 CAPSULE ORAL at 06:55

## 2025-06-09 RX ADMIN — GABAPENTIN 1200 MILLIGRAM(S): 400 CAPSULE ORAL at 14:19

## 2025-06-09 RX ADMIN — Medication 1 TABLET(S): at 12:55

## 2025-06-09 RX ADMIN — Medication 40 MILLIGRAM(S): at 06:55

## 2025-06-09 RX ADMIN — OXYCODONE HYDROCHLORIDE 20 MILLIGRAM(S): 30 TABLET ORAL at 10:15

## 2025-06-09 RX ADMIN — OXYCODONE HYDROCHLORIDE 15 MILLIGRAM(S): 30 TABLET ORAL at 18:00

## 2025-06-09 RX ADMIN — CELECOXIB 100 MILLIGRAM(S): 50 CAPSULE ORAL at 07:55

## 2025-06-09 RX ADMIN — Medication 400 MILLIGRAM(S): at 12:56

## 2025-06-09 RX ADMIN — Medication 1 APPLICATION(S): at 18:03

## 2025-06-09 RX ADMIN — APIXABAN 5 MILLIGRAM(S): 2.5 TABLET, FILM COATED ORAL at 06:54

## 2025-06-09 RX ADMIN — Medication 400 MILLIGRAM(S): at 18:03

## 2025-06-09 RX ADMIN — GABAPENTIN 1200 MILLIGRAM(S): 400 CAPSULE ORAL at 06:53

## 2025-06-09 RX ADMIN — OXYCODONE HYDROCHLORIDE 20 MILLIGRAM(S): 30 TABLET ORAL at 09:18

## 2025-06-09 RX ADMIN — Medication 1 APPLICATION(S): at 06:55

## 2025-06-09 RX ADMIN — NALOXEGOL OXALATE 25 MILLIGRAM(S): 12.5 TABLET, FILM COATED ORAL at 12:55

## 2025-06-09 RX ADMIN — SEVELAMER HYDROCHLORIDE 800 MILLIGRAM(S): 800 TABLET ORAL at 12:56

## 2025-06-09 RX ADMIN — METOPROLOL SUCCINATE 25 MILLIGRAM(S): 50 TABLET, EXTENDED RELEASE ORAL at 07:05

## 2025-06-09 RX ADMIN — SEVELAMER HYDROCHLORIDE 800 MILLIGRAM(S): 800 TABLET ORAL at 08:52

## 2025-06-09 NOTE — PROGRESS NOTE ADULT - SUBJECTIVE AND OBJECTIVE BOX
Subjective  Patient seen and examined this AM with therapy.   Father present during encounter.  Admits to sleeping well. Significantly improved ROM, and overall function  Sacral pain resolved.  Discussed need for wound care teaching with family.     ROS--no acute pain, no fever or chest pain   B/l leg and sacral ulcers,  LBM 6/7    Function -  -Ambulated 75' x 3 trials with bariatric RW needing CG/MIN A x 1 and wheelchair  follow. Progressed to reciprocal gait pattern utilizing less energy expenditure  with walking. Steppage gait pattern used today while wearing sneakers due to  weak ankle DF/foot drop.    Vital Signs Last 24 Hrs  T(C): 36.4 (09 Jun 2025 09:04), Max: 36.7 (08 Jun 2025 22:00)  T(F): 97.5 (09 Jun 2025 09:04), Max: 98 (08 Jun 2025 22:00)  HR: 97 (09 Jun 2025 09:04) (93 - 98)  BP: 105/74 (09 Jun 2025 09:04) (105/74 - 145/90)  BP(mean): --  RR: 16 (09 Jun 2025 09:04) (16 - 16)  SpO2: 96% (09 Jun 2025 09:04) (96% - 96%)      EXAM  Gen - Comfortable, cheerful, motivated   HEENT - NAD  Neck - Supple, No limited ROM  Pulm - Clear  Cardiovascular - RRR, S1S2, No murmurs  Abdomen - Soft, non tender, +BS  Extremities - chronic skin changes with hyperpigmentation at shin, callus on feet, responding to treatment   Ulcer on both feet examined, healthy scab noted     Neuro-  AAO X 3, Speech is normal     Motor - UE 5/5 Hip flexion 3/5, Knee 4/5 PF 4/5, DF 2/5     Sensory - Decreased to light touch bilateral lower extremities      Coordination - impaired lower extremities   Psychiatric - Mood stable, Affect WNL    Skin:  R foot plantar aspect lateral/distal side 7cm x 5.5cm ischemic wound from pressors  RLE 4th digit 1x1.5cm ischemic induced wound from pressors  RLE 5th digit 3x2cm ischemic induced wound from pressors  L foot plantar aspect lateral/distal side 6x7cm ischemic induced wound from pressor usage  LLE 4th digit 2x1cm ischemic induced wound from pressors  LLE 5th digit 2.5x3cm ischemic induced wound from pressors   R groin skin tear 1x1.5cm  Unstageable pressure wound cocyx 7cmx4.5cm w/ 2.5cm depth  R buttock pressure induced wound semi contiguous with coccyx wound calling unstageable given prior debridement 8.5x5.5cm  L buttock 1.0x0.5cm stage 3 pressure injury  R buttock skin tear 3.5cm x 0.5cm and 1.5cmx0.5cm  Dry skin with callus on sole of foot     MMS--antigravity upper extremities,    improved strength        LABS:                        9.5    5.86  )-----------( 400      ( 09 Jun 2025 06:30 )             32.6     06-09    144  |  105  |  13  ----------------------------<  96  4.0   |  31  |  1.18    Ca    9.3      09 Jun 2025 06:30    TPro  7.3  /  Alb  2.2[L]  /  TBili  0.3  /  DBili  x   /  AST  22  /  ALT  19  /  AlkPhos  58  06-09      Urinalysis Basic - ( 09 Jun 2025 06:30 )    Color: x / Appearance: x / SG: x / pH: x  Gluc: 96 mg/dL / Ketone: x  / Bili: x / Urobili: x   Blood: x / Protein: x / Nitrite: x   Leuk Esterase: x / RBC: x / WBC x   Sq Epi: x / Non Sq Epi: x / Bacteria: x        CAPILLARY BLOOD GLUCOSE      MEDICATIONS  (STANDING):  ammonium lactate 12% Lotion 1 Application(s) Topical two times a day  apixaban 5 milliGRAM(s) Oral two times a day  ascorbic acid 500 milliGRAM(s) Oral daily  bisacodyl 10 milliGRAM(s) Oral at bedtime  capsaicin 0.025% Cream 1 Application(s) Topical four times a day  clotrimazole 1% Cream 1 Application(s) Topical <User Schedule>  collagenase Ointment 1 Application(s) Topical two times a day  Dakins Solution - 1/4 Strength 1 Application(s) Topical two times a day  dextrose 5%. 1000 milliLiter(s) (50 mL/Hr) IV Continuous <Continuous>  dextrose 5%. 1000 milliLiter(s) (100 mL/Hr) IV Continuous <Continuous>  dextrose 50% Injectable 25 Gram(s) IV Push once  dextrose 50% Injectable 12.5 Gram(s) IV Push once  dextrose 50% Injectable 25 Gram(s) IV Push once  gabapentin 1200 milliGRAM(s) Oral every 8 hours  glucagon  Injectable 1 milliGRAM(s) IntraMuscular once  influenza   Vaccine 0.5 milliLiter(s) IntraMuscular once  lidocaine   4% Patch 1 Patch Transdermal daily  lidocaine   4% Patch 1 Patch Transdermal daily  magnesium oxide 400 milliGRAM(s) Oral three times a day with meals  melatonin 9 milliGRAM(s) Oral at bedtime  metoprolol tartrate 25 milliGRAM(s) Oral two times a day  multivitamin 1 Tablet(s) Oral daily  mupirocin 2% Ointment 1 Application(s) Topical two times a day  naloxegol 25 milliGRAM(s) Oral daily  pantoprazole    Tablet 40 milliGRAM(s) Oral before breakfast  petrolatum white Ointment 1 Application(s) Topical every 8 hours  polyethylene glycol 3350 17 Gram(s) Oral two times a day  povidone iodine 10% Solution 1 Application(s) Topical daily  QUEtiapine 25 milliGRAM(s) Oral at bedtime  sevelamer carbonate 800 milliGRAM(s) Oral three times a day with meals  silver nitrate Applicator 6 Application(s) Topical once  tamsulosin 0.8 milliGRAM(s) Oral at bedtime  zinc sulfate 220 milliGRAM(s) Oral daily      MEDICATIONS  (PRN):  albuterol/ipratropium for Nebulization 3 milliLiter(s) Nebulizer every 6 hours PRN Shortness of Breath and/or Wheezing  dextrose Oral Gel 15 Gram(s) Oral once PRN Blood Glucose LESS THAN 70 milliGRAM(s)/deciliter  oxyCODONE    IR 20 milliGRAM(s) Oral every 6 hours PRN Severe Pain (7 - 10)  oxyCODONE    IR 15 milliGRAM(s) Oral two times a day PRN Moderate Pain (4 - 6)  oxyCODONE    IR 10 milliGRAM(s) Oral two times a day PRN Mild Pain (1 - 3)     Subjective  Patient seen and examined this AM with therapy.   Father present during encounter.  Admits to sleeping well. Significantly improved ROM, and overall function  No pain over sacral wound  But nursing report that minimal drainage still apparent during wound care  Discussed need for wound care teaching with family. and dc planning      ROS--no acute pain, no fever or chest pain   B/l leg and sacral ulcers,  LBM 6/8    Function   -Ambulated 1x10' and 2x30' trials with bariatric RW needing CGA incorporating  turns from surface to surface to simulate household ambulation. Steppage gait  pattern used today while wearing sneakers due to weak ankle DF/foot drop      Vital Signs Last 24 Hrs  T(C): 36.4 (09 Jun 2025 09:04), Max: 36.7 (08 Jun 2025 22:00)  T(F): 97.5 (09 Jun 2025 09:04), Max: 98 (08 Jun 2025 22:00)  HR: 97 (09 Jun 2025 09:04) (93 - 98)  BP: 105/74 (09 Jun 2025 09:04) (105/74 - 145/90)  BP(mean): --  RR: 16 (09 Jun 2025 09:04) (16 - 16)  SpO2: 96% (09 Jun 2025 09:04) (96% - 96%)      EXAM  Gen - Comfortable, cheerful, motivated   HEENT - NAD  Neck - Supple, No limited ROM  Pulm - Clear  Cardiovascular - RRR, S1S2, No murmurs  Abdomen - Soft, non tender, +BS  Extremities - chronic skin changes with hyperpigmentation at shin, callus on feet, responding to treatment   Ulcer on both feet examined, healthy scab noted     Neuro-  AAO X 3, Speech is normal     Motor - UE 5/5 Hip flexion 3/5, Knee 4/5 PF 4/5, DF 2/5     Sensory - Decreased to light touch bilateral lower extremities      Coordination - impaired lower extremities   Psychiatric - Mood stable, Affect WNL    Skin:  R foot plantar aspect lateral/distal side 7cm x 5.5cm ischemic wound from pressors  RLE 4th digit 1x1.5cm ischemic induced wound from pressors  RLE 5th digit 3x2cm ischemic induced wound from pressors  L foot plantar aspect lateral/distal side 6x7cm ischemic induced wound from pressor usage  LLE 4th digit 2x1cm ischemic induced wound from pressors  LLE 5th digit 2.5x3cm ischemic induced wound from pressors   Unstageable pressure wound cocyx 7cmx4.5cm w/ 2.5cm depth  R buttock pressure induced wound semi contiguous with coccyx wound calling unstageable given prior debridement 8.5x5.5cm  L buttock 1.0x0.5cm stage 3 pressure injury  R buttock skin tear 3.5cm x 0.5cm and 1.5cmx0.5cm  Dry skin with callus on sole of foot     MMS--antigravity upper extremities,    improved strength        LABS:                        9.5    5.86  )-----------( 400      ( 09 Jun 2025 06:30 )             32.6     06-09    144  |  105  |  13  ----------------------------<  96  4.0   |  31  |  1.18    Ca    9.3      09 Jun 2025 06:30    TPro  7.3  /  Alb  2.2[L]  /  TBili  0.3  /  DBili  x   /  AST  22  /  ALT  19  /  AlkPhos  58  06-09      Urinalysis Basic - ( 09 Jun 2025 06:30 )    Color: x / Appearance: x / SG: x / pH: x  Gluc: 96 mg/dL / Ketone: x  / Bili: x / Urobili: x   Blood: x / Protein: x / Nitrite: x   Leuk Esterase: x / RBC: x / WBC x   Sq Epi: x / Non Sq Epi: x / Bacteria: x        CAPILLARY BLOOD GLUCOSE      MEDICATIONS  (STANDING):  ammonium lactate 12% Lotion 1 Application(s) Topical two times a day  apixaban 5 milliGRAM(s) Oral two times a day  ascorbic acid 500 milliGRAM(s) Oral daily  bisacodyl 10 milliGRAM(s) Oral at bedtime  capsaicin 0.025% Cream 1 Application(s) Topical four times a day  clotrimazole 1% Cream 1 Application(s) Topical <User Schedule>  collagenase Ointment 1 Application(s) Topical two times a day  Dakins Solution - 1/4 Strength 1 Application(s) Topical two times a day  dextrose 5%. 1000 milliLiter(s) (50 mL/Hr) IV Continuous <Continuous>  dextrose 5%. 1000 milliLiter(s) (100 mL/Hr) IV Continuous <Continuous>  dextrose 50% Injectable 25 Gram(s) IV Push once  dextrose 50% Injectable 12.5 Gram(s) IV Push once  dextrose 50% Injectable 25 Gram(s) IV Push once  gabapentin 1200 milliGRAM(s) Oral every 8 hours  glucagon  Injectable 1 milliGRAM(s) IntraMuscular once  influenza   Vaccine 0.5 milliLiter(s) IntraMuscular once  lidocaine   4% Patch 1 Patch Transdermal daily  lidocaine   4% Patch 1 Patch Transdermal daily  magnesium oxide 400 milliGRAM(s) Oral three times a day with meals  melatonin 9 milliGRAM(s) Oral at bedtime  metoprolol tartrate 25 milliGRAM(s) Oral two times a day  multivitamin 1 Tablet(s) Oral daily  mupirocin 2% Ointment 1 Application(s) Topical two times a day  naloxegol 25 milliGRAM(s) Oral daily  pantoprazole    Tablet 40 milliGRAM(s) Oral before breakfast  petrolatum white Ointment 1 Application(s) Topical every 8 hours  polyethylene glycol 3350 17 Gram(s) Oral two times a day  povidone iodine 10% Solution 1 Application(s) Topical daily  QUEtiapine 25 milliGRAM(s) Oral at bedtime  sevelamer carbonate 800 milliGRAM(s) Oral three times a day with meals  silver nitrate Applicator 6 Application(s) Topical once  tamsulosin 0.8 milliGRAM(s) Oral at bedtime  zinc sulfate 220 milliGRAM(s) Oral daily      MEDICATIONS  (PRN):  albuterol/ipratropium for Nebulization 3 milliLiter(s) Nebulizer every 6 hours PRN Shortness of Breath and/or Wheezing  dextrose Oral Gel 15 Gram(s) Oral once PRN Blood Glucose LESS THAN 70 milliGRAM(s)/deciliter  oxyCODONE    IR 20 milliGRAM(s) Oral every 6 hours PRN Severe Pain (7 - 10)  oxyCODONE    IR 15 milliGRAM(s) Oral two times a day PRN Moderate Pain (4 - 6)  oxyCODONE    IR 10 milliGRAM(s) Oral two times a day PRN Mild Pain (1 - 3)

## 2025-06-09 NOTE — PROGRESS NOTE ADULT - ASSESSMENT
39 year old male with PMH of morbid obesity, BEA on CPAP, pAFIB (not on AC prior to admission), HTN, and BL papilledema attributed to pseudotumor cerebri; who initially presented to  on 2/24 with SOB and BL LE edema. Found to have URI with progressive SOB and multifocal PNA on imaging. His hospital course was complicated by worsening respiratory distress and increased 02 demands secondary to secretions (requiring multiple intubation attempts) and eventual MICU admission. While in MICU, he 02 requirements continued despite optimal vent management. Concern noted for progressive ARDS, unable to prone given morbid obesity, and ultimately cannulated for vvECMO on 2/25 and transferred to [facility] for further management on 2/26.  His hospital course at [facility] was significant for the following: diuretic and ABX management, s/p trache and PEG (placed on 3/7- PEG since removed), RCU admission, high grade fevers (secondary to panniculitis), progressively worsening sacral ulcer (likely osteomyelitis- s/p surgical debridement), BL foot wound (secondary to pressor use), neuropathy (secondary to critical illness polyneuropathy), ELLA (secondary to vancomycin toxicity and overdiuresis- since DCd- with improvement). Patient now admitted for a multidisciplinary rehab program. 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  The patient is having restrictive disease secondary to OHS as indicated by PFTs. Patient is currently hospitalized with diagnosis of acute on chronic hypercapnic respiratory failure secondary to thoracic restrictive disease related/consequent to OHS and/or morbid obesity. Patient demonstrates shortness of breath and accessory muscle use at rest. Despite the nightly use of BIPAP16/5 the patient remained hypercapnic with a PCO2 of 67.  The patient requires volume augmented ventilation with targeted tidal volume not found on a standard BiPAP for home use. The patient would benefit from NIV, otherwise would be at risk for further deterioration, readmissions and possible patient harm.     # Persisting/significant DF weakness AFOs  - Orthotic eval for BL solid AFOs   - This patient has a diagnosis of BL foot drop. Patient is ambulatory. Patient requires a custom molded hinged AFO to preserve ankle motion while stabilizing talar and subtalar joints. Device will give a more natural gait without destabilizing the knee and hip. Device requires a low density lining to protect prominent roderick areas of effected skin. Patient will need the device for a term of greater than 9 months. No reasonable prefabricated orthosis exists.         #Sacral Osteomyelitis  - Zosyn TID - completed on 5/12/25    #Paroxysmal AFIB  - Eliquis 5mg BID     #Calf Tenderness/pain b/l feet  - BL LE doppler negative 5/21   --Apply offloading boots when OOB      #L hip pain - resolved  - no acute findings on XR or CT   - L hip MRI (5/7) no significant findings     #HTN  - Amlodipine 10mg dialy     #Sleep/Mood  - Melatonin 9mg at HS   - Quetiapine 25mg at HS     #Skin  Wound Recs per Plastic Surgery (5/16):  Sacral Pressure Injury: BID- Cleanse with NS, remove excess fluid, apply santyl ointment to sloughing tissues, pack wound with 1-inch dry plain packing, cover with 4 inch gauze and abd combine  - R buttock: BID- Cleanse with NS, overlay calcium alginate, ABD combine dressing  Additional wound care instructions:  -->Right anterior thigh to groin isolated wound: Clean wound and periwound skin with NS. Pat dry. Apply liquid barrier film to periwound skin, allow to dry. Cover with silicone foam with border. Change daily or PRN if soiled   --> Bilateral abdominal pannus, bilateral groin: Cleanse with luke-warm soap and water, dry well. Apply clotrimazole  --> Bilateral feet: Apply betadine  to bilateral foot wounds followed by 4x4 gauze, ABD pad, and matilde daily per podiatry.  --> Continue to offload pressure; bariatric low airloss support surface, turn and position per protocol with use of fluidized positioning devices, continue incontinence and moisture care per protocol and use of single breathable incontinence pads, continue to offload heels with fluidized positioning devices. Continue use of bariatric seat cushion, limit sit time- no more than 2 consecutive hours at any given time.  - Pressure injury/Skin: OOB to Chair, PT/OT    - Ammonium lactate to BL LEs     #Neuropathy/sacral ulcer  - Gabapentin 1200mg TID --aim to taper to lower dose    #Pain Mgmt   - Capsaicin cream to BL feet QID - Avoid use on damaged, broken, or irritated skin. Avoid occlusive dressing or heat   - Lidocaine patch to BL thighs   - PRN:; Oxycodone and Tylenol 650mg QID   -celebrex 100mg bid for sacral pain   - neuropathic pain b/l feet c/w   gabapentin    #Morbid obesity  - Most recent weight 394lbs (5/7)     #GI/Bowel Mgmt   - Pantoprazole  - Bisacodyl 10mg at HS   - Miralax   - Naloxegal 25mg daily     #/Bladder Mgmt   #ELLA  - USKB 5/20 negative   - Renvela 800mg TID    #FEN --Morbid obesity (BMI > 40).  - Diet - Regular + [facility]ins  [facility]    - MVI     # Health Management:   Environmental challenge affecting dc plan--narrow door and need for ramp to house entrance  However, patient making goal directed effort towards therapy goals     #Precautions / PROPHYLAXIS:   - Falls  - ortho: Weight bearing status: WBAT in surgical shoe   - Lungs: Aspiration, Incentive Spirometer   - DVT: Lovenox  ----------------------------------------------      Function as at 6/2  Ambulating 35 ft  with RW wc follow through min assist (marked improvement)  Stand pivot transfers, bed to wc  Barrier--ulcers on feet, limiting wt bearing,  environmental barrier at home (stairs), severe obesity  Est dc revised to 6/13, issues with insurance, not approving FLORENCE      ----------------------------------------------  Dr. CANTU's Liaison with Family/Providers:  6/1- Met Patient's father during his visit to the unit during PM rounds, discssed review today, improved function, good pain control, appropriate use of offloading shoes  He is happy with this. Significant functional progress over last few days noted   The main challenge in DF strength, we will work on using ace wraps, and will get orthotics review for AFO if this persists    ----------------------------------------------  OUTPATIENT/FOLLOW UP:    Your Primary Care Provider,   Phone: (   )    -  Fax: (   )    -  Follow Up Time: 1 week    [facility],   [address] [place] [address]  Phone: [phone]  Fax: (   )    -  Follow Up Time:    Dr. Waterhouse  Podiatry  [phone]  Within 1 week       39 year old male with PMH of morbid obesity, BEA on CPAP, pAFIB (not on AC prior to admission), HTN, and BL papilledema attributed to pseudotumor cerebri; who initially presented to  on 2/24 with SOB and BL LE edema. Found to have URI with progressive SOB and multifocal PNA on imaging. His hospital course was complicated by worsening respiratory distress and increased 02 demands secondary to secretions (requiring multiple intubation attempts) and eventual MICU admission. While in MICU, he 02 requirements continued despite optimal vent management. Concern noted for progressive ARDS, unable to prone given morbid obesity, and ultimately cannulated for vvECMO on 2/25 and transferred to [facility] for further management on 2/26.  His hospital course at [facility] was significant for the following: diuretic and ABX management, s/p trache and PEG (placed on 3/7- PEG since removed), RCU admission, high grade fevers (secondary to panniculitis), progressively worsening sacral ulcer (likely osteomyelitis- s/p surgical debridement), BL foot wound (secondary to pressor use), neuropathy (secondary to critical illness polyneuropathy), ELLA (secondary to vancomycin toxicity and overdiuresis- since DCd- with improvement). Patient now admitted for a multidisciplinary rehab program. 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  The patient is having restrictive disease secondary to OHS as indicated by PFTs. Patient is currently hospitalized with diagnosis of acute on chronic hypercapnic respiratory failure secondary to thoracic restrictive disease related/consequent to OHS and/or morbid obesity. Patient demonstrates shortness of breath and accessory muscle use at rest. Despite the nightly use of BIPAP16/5 the patient remained hypercapnic with a PCO2 of 67.  The patient requires volume augmented ventilation with targeted tidal volume not found on a standard BiPAP for home use. The patient would benefit from NIV, otherwise would be at risk for further deterioration, readmissions and possible patient harm.     # Persisting/significant DF weakness AFOs  - Orthotic eval for BL solid AFOs   - This patient has a diagnosis of BL foot drop. Patient is ambulatory. Patient requires a custom molded hinged AFO to preserve ankle motion while stabilizing talar and subtalar joints. Device will give a more natural gait without destabilizing the knee and hip. Device requires a low density lining to protect prominent roderick areas of effected skin. Patient will need the device for a term of greater than 9 months. No reasonable prefabricated orthosis exists.         #Sacral Osteomyelitis  - Zosyn TID - completed on 5/12/25    #Paroxysmal AFIB  - Eliquis 5mg BID     #Calf Tenderness/pain b/l feet  - BL LE doppler negative 5/21   --Apply offloading boots when OOB      #L hip pain - resolved  - no acute findings on XR or CT   - L hip MRI (5/7) no significant findings     #HTN  - Amlodipine 10mg dialy     #Sleep/Mood  - Melatonin 9mg at HS   - Quetiapine 25mg at HS     #Skin  Wound Recs per Plastic Surgery (5/16):  Sacral Pressure Injury: BID- Cleanse with NS, remove excess fluid, apply santyl ointment to sloughing tissues, pack wound with 1-inch dry plain packing, cover with 4 inch gauze and abd combine  - R buttock: BID- Cleanse with NS, overlay calcium alginate, ABD combine dressing  Additional wound care instructions:  -->Right anterior thigh to groin isolated wound: Clean wound and periwound skin with NS. Pat dry. Apply liquid barrier film to periwound skin, allow to dry. Cover with silicone foam with border. Change daily or PRN if soiled   --> Bilateral abdominal pannus, bilateral groin: Cleanse with luke-warm soap and water, dry well. Apply clotrimazole  --> Bilateral feet: Apply betadine  to bilateral foot wounds followed by 4x4 gauze, ABD pad, and matilde daily per podiatry.  --> Continue to offload pressure; bariatric low airloss support surface, turn and position per protocol with use of fluidized positioning devices, continue incontinence and moisture care per protocol and use of single breathable incontinence pads, continue to offload heels with fluidized positioning devices. Continue use of bariatric seat cushion, limit sit time- no more than 2 consecutive hours at any given time.  - Pressure injury/Skin: OOB to Chair, PT/OT    - Ammonium lactate to BL LEs     #Neuropathy/sacral ulcer  - Gabapentin 1200mg TID --aim to taper to lower dose    #Pain Mgmt   - Capsaicin cream to BL feet QID - Avoid use on damaged, broken, or irritated skin. Avoid occlusive dressing or heat   - Lidocaine patch to BL thighs   - PRN:; Oxycodone and Tylenol 650mg QID   -celebrex 100mg bid for sacral pain   - neuropathic pain b/l feet c/w   gabapentin    #Morbid obesity  - Most recent weight 394lbs (5/7)     #GI/Bowel Mgmt   - Pantoprazole  - Bisacodyl 10mg at HS   - Miralax   - Naloxegal 25mg daily     #/Bladder Mgmt   #ELLA  - USKB 5/20 negative   - Renvela 800mg TID    #FEN --Morbid obesity (BMI > 40).  - Diet - Regular + Thins  [[facility]]    - MVI     # Health Management:   Environmental challenge affecting dc plan--narrow door and need for ramp to house entrance  However, patient making goal directed effort towards therapy goals     #Precautions / PROPHYLAXIS:   - Falls  - ortho: Weight bearing status: WBAT in surgical shoe   - Lungs: Aspiration, Incentive Spirometer   - DVT: Lovenox  ----------------------------------------------      Function as at 6/2  Ambulating 35 ft  with RW wc follow through min assist (marked improvement)  Stand pivot transfers, bed to wc  Barrier--ulcers on feet, limiting wt bearing,  environmental barrier at home (stairs), severe obesity  Est dc revised to 6/13, issues with insurance, not approving FLORENCE      ----------------------------------------------  Dr. CANTU's Liaison with Family/Providers:  6/1- Met Patient's father during his visit to the unit during PM rounds, discssed review today, improved function, good pain control, appropriate use of offloading shoes  He is happy with this. Significant functional progress over last few days noted   The main challenge in DF strength, we will work on using ace wraps, and will get orthotics review for AFO if this persists    ----------------------------------------------  OUTPATIENT/FOLLOW UP:    Your Primary Care Provider,   Phone: (   )    -  Fax: (   )    -  Follow Up Time: 1 week    [facility],   [address] [place] [address]  Phone: [phone]  Fax: (   )    -  Follow Up Time:    Dr. Waterhouse  Podiatry  [phone]  Within 1 week       39 year old male with PMH of morbid obesity, BEA on CPAP, pAFIB (not on AC prior to admission), HTN, and BL papilledema attributed to pseudotumor cerebri; who initially presented to  on 2/24 with SOB and BL LE edema. Found to have URI with progressive SOB and multifocal PNA on imaging. His hospital course was complicated by worsening respiratory distress and increased 02 demands secondary to secretions (requiring multiple intubation attempts) and eventual MICU admission. While in MICU, he 02 requirements continued despite optimal vent management. Concern noted for progressive ARDS, unable to prone given morbid obesity, and ultimately cannulated for vvECMO on 2/25 and transferred to [facility] for further management on 2/26.  His hospital course at [facility] was significant for the following: diuretic and ABX management, s/p trache and PEG (placed on 3/7- PEG since removed), RCU admission, high grade fevers (secondary to panniculitis), progressively worsening sacral ulcer (likely osteomyelitis- s/p surgical debridement), BL foot wound (secondary to pressor use), neuropathy (secondary to critical illness polyneuropathy), ELLA (secondary to vancomycin toxicity and overdiuresis- since DCd- with improvement). Patient now admitted for a multidisciplinary rehab program. 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  The patient is having restrictive disease secondary to OHS as indicated by PFTs. Patient is currently hospitalized with diagnosis of acute on chronic hypercapnic respiratory failure secondary to thoracic restrictive disease related/consequent to OHS and/or morbid obesity. Patient demonstrates shortness of breath and accessory muscle use at rest. Despite the nightly use of BIPAP16/5 the patient remained hypercapnic with a PCO2 of 67.  The patient requires volume augmented ventilation with targeted tidal volume not found on a standard BiPAP for home use. The patient would benefit from NIV, otherwise would be at risk for further deterioration, readmissions and possible patient harm.     # Persisting/significant DF weakness AFOs  - Orthotic eval for BL solid AFOs   - This patient has a diagnosis of BL foot drop. Patient is ambulatory. Patient requires a custom molded hinged AFO to preserve ankle motion while stabilizing talar and subtalar joints. Device will give a more natural gait without destabilizing the knee and hip. Device requires a low density lining to protect prominent roderick areas of effected skin. Patient will need the device for a term of greater than 9 months. No reasonable prefabricated orthosis exists.       #Sacral Osteomyelitis  - Zosyn TID - completed on 5/12/25    #Paroxysmal AFIB  - Eliquis 5mg BID     #Calf Tenderness/pain b/l feet  - BL LE doppler negative 5/21   --Apply offloading boots when OOB      #L hip pain - resolved  - no acute findings on XR or CT   - L hip MRI (5/7) no significant findings     #HTN  - Amlodipine 10mg dialy     #Sleep/Mood  - Melatonin 9mg at HS   - Quetiapine 25mg at HS     #Skin  Wound Recs per Plastic Surgery (5/16):  Sacral Pressure Injury: BID- Cleanse with NS, remove excess fluid, apply santyl ointment to sloughing tissues, pack wound with 1-inch dry plain packing, cover with 4 inch gauze and abd combine  - R buttock: BID- Cleanse with NS, overlay calcium alginate, ABD combine dressing  Additional wound care instructions:  -->Right anterior thigh to groin isolated wound: Clean wound and periwound skin with NS. Pat dry. Apply liquid barrier film to periwound skin, allow to dry. Cover with silicone foam with border. Change daily or PRN if soiled   --> Bilateral abdominal pannus, bilateral groin: Cleanse with luke-warm soap and water, dry well. Apply clotrimazole  --> Bilateral feet: Apply betadine  to bilateral foot wounds followed by 4x4 gauze, ABD pad, and matilde daily per podiatry.  --> Continue to offload pressure; bariatric low airloss support surface, turn and position per protocol with use of fluidized positioning devices, continue incontinence and moisture care per protocol and use of single breathable incontinence pads, continue to offload heels with fluidized positioning devices. Continue use of bariatric seat cushion, limit sit time- no more than 2 consecutive hours at any given time.  - Pressure injury/Skin: OOB to Chair, PT/OT    - Ammonium lactate to BL LEs     #Neuropathy/sacral ulcer  - Gabapentin 1200mg TID --aim to taper to lower dose    #Pain Mgmt   - Capsaicin cream to BL feet QID - Avoid use on damaged, broken, or irritated skin. Avoid occlusive dressing or heat   - Lidocaine patch to BL thighs   - PRN:; Oxycodone and Tylenol 650mg QID   -celebrex 100mg bid for sacral pain   - neuropathic pain b/l feet c/w   gabapentin    #Morbid obesity  - Most recent weight 394lbs (5/7)     #GI/Bowel Mgmt   - Pantoprazole  - Bisacodyl 10mg at HS   - Miralax   - Naloxegal 25mg daily     #/Bladder Mgmt   #ELLA  - USKB 5/20 negative   - Renvela 800mg TID    #FEN --Morbid obesity (BMI > 40).  - Diet - Regular + Thins  [[facility]]    - MVI     # Health Management:   Environmental challenge affecting dc plan--narrow door and need for ramp to house entrance  However, patient making goal directed effort towards therapy goals     #Precautions / PROPHYLAXIS:   - Falls  - ortho: Weight bearing status: WBAT in surgical shoe   - Lungs: Aspiration, Incentive Spirometer   - DVT: Lovenox  ----------------------------------------------      Function as at 6/2  Ambulating 35 ft  with RW wc follow through min assist (marked improvement)  Stand pivot transfers, bed to wc  Barrier--ulcers on feet, limiting wt bearing,  environmental barrier at home (stairs), severe obesity  Est dc revised to 6/13, issues with insurance, not approving FLORENCE      ----------------------------------------------  Dr. CANTU's Liaison with Family/Providers:  6/9- Met Patient's father during his visit to the unit during, he expressed happiness with patient's functional progress  We discussed dc planning including plans to address environmental barriers at home  Reports that there is a back entrance through which he could enter the house with limited number of stairs      ----------------------------------------------  OUTPATIENT/FOLLOW UP:    Your Primary Care Provider,   Phone: (   )    -  Fax: (   )    -  Follow Up Time: 1 week    [facility],   [address] [place] [address]  Phone: [phone]  Fax: (   )    -  Follow Up Time:    Dr. Waterhouse  Podiatry  [phone]  Within 1 week

## 2025-06-10 LAB
GLUCOSE BLDC GLUCOMTR-MCNC: 111 MG/DL — HIGH (ref 70–99)
GLUCOSE BLDC GLUCOMTR-MCNC: 116 MG/DL — HIGH (ref 70–99)

## 2025-06-10 PROCEDURE — 99233 SBSQ HOSP IP/OBS HIGH 50: CPT

## 2025-06-10 PROCEDURE — 93010 ELECTROCARDIOGRAM REPORT: CPT

## 2025-06-10 PROCEDURE — 71045 X-RAY EXAM CHEST 1 VIEW: CPT | Mod: 26

## 2025-06-10 RX ORDER — LACTULOSE 10 G/15ML
20 SOLUTION ORAL ONCE
Refills: 0 | Status: COMPLETED | OUTPATIENT
Start: 2025-06-10 | End: 2025-06-10

## 2025-06-10 RX ORDER — SODIUM CHLORIDE 9 G/1000ML
500 INJECTION, SOLUTION INTRAVENOUS ONCE
Refills: 0 | Status: COMPLETED | OUTPATIENT
Start: 2025-06-10 | End: 2025-06-10

## 2025-06-10 RX ORDER — LACTULOSE 10 G/15ML
20 SOLUTION ORAL ONCE
Refills: 0 | Status: DISCONTINUED | OUTPATIENT
Start: 2025-06-10 | End: 2025-06-10

## 2025-06-10 RX ORDER — METOPROLOL SUCCINATE 50 MG/1
12.5 TABLET, EXTENDED RELEASE ORAL
Refills: 0 | Status: DISCONTINUED | OUTPATIENT
Start: 2025-06-10 | End: 2025-06-13

## 2025-06-10 RX ADMIN — Medication 9 MILLIGRAM(S): at 20:29

## 2025-06-10 RX ADMIN — OXYCODONE HYDROCHLORIDE 20 MILLIGRAM(S): 30 TABLET ORAL at 22:59

## 2025-06-10 RX ADMIN — GABAPENTIN 1200 MILLIGRAM(S): 400 CAPSULE ORAL at 00:25

## 2025-06-10 RX ADMIN — Medication 40 MILLIGRAM(S): at 06:31

## 2025-06-10 RX ADMIN — Medication 1 APPLICATION(S): at 13:23

## 2025-06-10 RX ADMIN — NALOXEGOL OXALATE 25 MILLIGRAM(S): 12.5 TABLET, FILM COATED ORAL at 13:22

## 2025-06-10 RX ADMIN — OXYCODONE HYDROCHLORIDE 20 MILLIGRAM(S): 30 TABLET ORAL at 10:20

## 2025-06-10 RX ADMIN — Medication 500 MILLIGRAM(S): at 13:22

## 2025-06-10 RX ADMIN — Medication 400 MILLIGRAM(S): at 13:21

## 2025-06-10 RX ADMIN — APIXABAN 5 MILLIGRAM(S): 2.5 TABLET, FILM COATED ORAL at 06:30

## 2025-06-10 RX ADMIN — METOPROLOL SUCCINATE 25 MILLIGRAM(S): 50 TABLET, EXTENDED RELEASE ORAL at 06:30

## 2025-06-10 RX ADMIN — GABAPENTIN 1200 MILLIGRAM(S): 400 CAPSULE ORAL at 06:31

## 2025-06-10 RX ADMIN — APIXABAN 5 MILLIGRAM(S): 2.5 TABLET, FILM COATED ORAL at 19:09

## 2025-06-10 RX ADMIN — GABAPENTIN 1200 MILLIGRAM(S): 400 CAPSULE ORAL at 13:22

## 2025-06-10 RX ADMIN — Medication 1 APPLICATION(S): at 19:11

## 2025-06-10 RX ADMIN — Medication 400 MILLIGRAM(S): at 19:10

## 2025-06-10 RX ADMIN — SEVELAMER HYDROCHLORIDE 800 MILLIGRAM(S): 800 TABLET ORAL at 08:41

## 2025-06-10 RX ADMIN — OXYCODONE HYDROCHLORIDE 20 MILLIGRAM(S): 30 TABLET ORAL at 08:41

## 2025-06-10 RX ADMIN — QUETIAPINE FUMARATE 25 MILLIGRAM(S): 25 TABLET ORAL at 00:25

## 2025-06-10 RX ADMIN — Medication 400 MILLIGRAM(S): at 10:42

## 2025-06-10 RX ADMIN — OXYCODONE HYDROCHLORIDE 20 MILLIGRAM(S): 30 TABLET ORAL at 02:40

## 2025-06-10 RX ADMIN — SEVELAMER HYDROCHLORIDE 800 MILLIGRAM(S): 800 TABLET ORAL at 19:10

## 2025-06-10 RX ADMIN — Medication 9 MILLIGRAM(S): at 00:24

## 2025-06-10 RX ADMIN — OXYCODONE HYDROCHLORIDE 20 MILLIGRAM(S): 30 TABLET ORAL at 01:40

## 2025-06-10 RX ADMIN — SEVELAMER HYDROCHLORIDE 800 MILLIGRAM(S): 800 TABLET ORAL at 13:23

## 2025-06-10 RX ADMIN — SODIUM CHLORIDE 1000 MILLILITER(S): 9 INJECTION, SOLUTION INTRAVENOUS at 11:12

## 2025-06-10 RX ADMIN — SODIUM HYPOCHLORITE 1 APPLICATION(S): 0.12 SOLUTION TOPICAL at 19:11

## 2025-06-10 RX ADMIN — Medication 220 MILLIGRAM(S): at 13:22

## 2025-06-10 RX ADMIN — TAMSULOSIN HYDROCHLORIDE 0.8 MILLIGRAM(S): 0.4 CAPSULE ORAL at 00:26

## 2025-06-10 RX ADMIN — Medication 1 TABLET(S): at 13:23

## 2025-06-10 RX ADMIN — QUETIAPINE FUMARATE 25 MILLIGRAM(S): 25 TABLET ORAL at 20:29

## 2025-06-10 RX ADMIN — GABAPENTIN 1200 MILLIGRAM(S): 400 CAPSULE ORAL at 20:29

## 2025-06-10 NOTE — PROVIDER CONTACT NOTE (OTHER) - BACKGROUND
Patient 40 yo male with critical illness myopathy with PMH of morbid obesity, BEA on CPAP and pAFIB, HTN, and BL papilledema
Patient here for critical illness myopathy, had a decreased appetite during the shift, last bowel movement 6/7/25

## 2025-06-10 NOTE — PROGRESS NOTE ADULT - SUBJECTIVE AND OBJECTIVE BOX
CC: Patient is a 39y old  Male who presents with a chief complaint of Debility secondary to acute hypoxic respiratory failure (10 Nayan 2025 10:56)      Interval History:  Patient seen and examined at bedside during rapid response  pt was diaphretic, hypotensive, tachycardic in therapy. BP 61/30 when returned to bed. recheck 76/50. pt was also reported to be hypoxic 83% on RA.   he reports he did not eat anything this morning and had back to back sessions.   he denies CP. no SOB. no lightheadedness. reports feeling "hot'.   while awaiting IV site, pt's legs were elevated and Bp and HR improved.     ALLERGIES:  No Known Allergies    MEDICATIONS  (STANDING):  ammonium lactate 12% Lotion 1 Application(s) Topical two times a day  apixaban 5 milliGRAM(s) Oral two times a day  ascorbic acid 500 milliGRAM(s) Oral daily  bisacodyl 10 milliGRAM(s) Oral at bedtime  capsaicin 0.025% Cream 1 Application(s) Topical four times a day  clotrimazole 1% Cream 1 Application(s) Topical <User Schedule>  collagenase Ointment 1 Application(s) Topical two times a day  Dakins Solution - 1/4 Strength 1 Application(s) Topical two times a day  dextrose 5%. 1000 milliLiter(s) (50 mL/Hr) IV Continuous <Continuous>  dextrose 5%. 1000 milliLiter(s) (100 mL/Hr) IV Continuous <Continuous>  dextrose 50% Injectable 25 Gram(s) IV Push once  dextrose 50% Injectable 12.5 Gram(s) IV Push once  dextrose 50% Injectable 25 Gram(s) IV Push once  gabapentin 1200 milliGRAM(s) Oral every 8 hours  glucagon  Injectable 1 milliGRAM(s) IntraMuscular once  influenza   Vaccine 0.5 milliLiter(s) IntraMuscular once  lactulose Syrup 20 Gram(s) Oral once  lidocaine   4% Patch 1 Patch Transdermal daily  lidocaine   4% Patch 1 Patch Transdermal daily  magnesium oxide 400 milliGRAM(s) Oral three times a day with meals  melatonin 9 milliGRAM(s) Oral at bedtime  metoprolol tartrate 12.5 milliGRAM(s) Oral two times a day  multivitamin 1 Tablet(s) Oral daily  mupirocin 2% Ointment 1 Application(s) Topical two times a day  naloxegol 25 milliGRAM(s) Oral daily  pantoprazole    Tablet 40 milliGRAM(s) Oral before breakfast  petrolatum white Ointment 1 Application(s) Topical every 8 hours  polyethylene glycol 3350 17 Gram(s) Oral two times a day  povidone iodine 10% Solution 1 Application(s) Topical daily  QUEtiapine 25 milliGRAM(s) Oral at bedtime  sevelamer carbonate 800 milliGRAM(s) Oral three times a day with meals  silver nitrate Applicator 6 Application(s) Topical once  zinc sulfate 220 milliGRAM(s) Oral daily    MEDICATIONS  (PRN):  albuterol/ipratropium for Nebulization 3 milliLiter(s) Nebulizer every 6 hours PRN Shortness of Breath and/or Wheezing  dextrose Oral Gel 15 Gram(s) Oral once PRN Blood Glucose LESS THAN 70 milliGRAM(s)/deciliter  oxyCODONE    IR 20 milliGRAM(s) Oral every 6 hours PRN Severe Pain (7 - 10)  oxyCODONE    IR 15 milliGRAM(s) Oral two times a day PRN Moderate Pain (4 - 6)  oxyCODONE    IR 10 milliGRAM(s) Oral two times a day PRN Mild Pain (1 - 3)    Vital Signs Last 24 Hrs  T(F): 98 (10 Nayan 2025 08:35), Max: 98.4 (09 Jun 2025 23:00)  HR: 87 (10 Nayan 2025 08:35) (87 - 97)  BP: 111/73 (10 Nayan 2025 08:35) (111/73 - 120/78)  RR: 16 (10 Nayan 2025 08:35) (16 - 16)  SpO2: 94% (10 Nayan 2025 08:35) (94% - 95%)  I&O's Summary    09 Jun 2025 07:01  -  10 Nayan 2025 07:00  --------------------------------------------------------  IN: 0 mL / OUT: 1320 mL / NET: -1320 mL    PHYSICAL EXAM:  GENERAL: mild diaphoretic. obese. conversing. mentation intact.   HEENT: NCAT  CHEST/LUNG: Clear to percussion bilaterally; No rales, rhonchi, wheezing. 2L nc  HEART: tachy. no obvious murmurs.   ABDOMEN: Soft, Nontender, Nondistended; Bowel sounds present  NEURO: Alert & Oriented x 3      LABS:                        9.5    5.86  )-----------( 400      ( 09 Jun 2025 06:30 )             32.6       06-09    144  |  105  |  13  ----------------------------<  96  4.0   |  31  |  1.18    Ca    9.3      09 Jun 2025 06:30    TPro  7.3  /  Alb  2.2  /  TBili  0.3  /  DBili  x   /  AST  22  /  ALT  19  /  AlkPhos  58  06-09                              POCT Blood Glucose.: 111 mg/dL (10 Nayan 2025 10:46)  POCT Blood Glucose.: 116 mg/dL (10 Nayan 2025 10:34)      Urinalysis Basic - ( 09 Jun 2025 06:30 )    Color: x / Appearance: x / SG: x / pH: x  Gluc: 96 mg/dL / Ketone: x  / Bili: x / Urobili: x   Blood: x / Protein: x / Nitrite: x   Leuk Esterase: x / RBC: x / WBC x   Sq Epi: x / Non Sq Epi: x / Bacteria: x            Care Discussed with Consultants/Other Providers: Yes

## 2025-06-10 NOTE — PROVIDER CONTACT NOTE (OTHER) - REASON
Rapid response called d/t to tachycardia, hypotension, diaphoresis, hypoxic
Patient has 5/10 right upper abdominal pain when turning to the right side

## 2025-06-10 NOTE — PROVIDER CONTACT NOTE (OTHER) - ACTION/TREATMENT ORDERED:
lactulose ordered, patient refused because he was able to have a large bowel movement, pain continues, calling provider to reassess
500 cc fluid bolus of lactated ringers

## 2025-06-10 NOTE — PROGRESS NOTE ADULT - ASSESSMENT
39 year old male with PMH of morbid obesity, BEA on CPAP, pAFIB (not on AC prior to admission), HTN, and BL papilledema attributed to pseudotumor cerebri; who initially presented to  on 2/24 with SOB and BL LE edema. Found to have URI with progressive SOB and multifocal PNA on imaging. His hospital course was complicated by worsening respiratory distress and increased 02 demands secondary to secretions (requiring multiple intubation attempts) and eventual MICU admission. While in MICU, he 02 requirements continued despite optimal vent management. Concern noted for progressive ARDS, unable to prone given morbid obesity, and ultimately cannulated for vvECMO on 2/25 and transferred to [facility] for further management on 2/26.  His hospital course at [facility] was significant for the following: diuretic and ABX management, s/p trache and PEG (placed on 3/7- PEG since removed), RCU admission, high grade fevers (secondary to panniculitis), progressively worsening sacral ulcer (likely osteomyelitis- s/p surgical debridement), BL foot wound (secondary to pressor use), neuropathy (secondary to critical illness polyneuropathy), ELLA (secondary to vancomycin toxicity and overdiuresis- since DCd- with improvement). Patient now admitted for a multidisciplinary rehab program. 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  The patient is having restrictive disease secondary to OHS as indicated by PFTs. Patient is currently hospitalized with diagnosis of acute on chronic hypercapnic respiratory failure secondary to thoracic restrictive disease related/consequent to OHS and/or morbid obesity. Patient demonstrates shortness of breath and accessory muscle use at rest. Despite the nightly use of BIPAP16/5 the patient remained hypercapnic with a PCO2 of 67.  The patient requires volume augmented ventilation with targeted tidal volume not found on a standard BiPAP for home use. The patient would benefit from NIV, otherwise would be at risk for further deterioration, readmissions and possible patient harm.     # Persisting/significant DF weakness AFOs  - Orthotic eval for BL solid AFOs   - This patient has a diagnosis of BL foot drop. Patient is ambulatory. Patient requires a custom molded hinged AFO to preserve ankle motion while stabilizing talar and subtalar joints. Device will give a more natural gait without destabilizing the knee and hip. Device requires a low density lining to protect prominent roderick areas of effected skin. Patient will need the device for a term of greater than 9 months. No reasonable prefabricated orthosis exists.       #Sacral Osteomyelitis  - Zosyn TID - completed on 5/12/25    #Paroxysmal AFIB  - Eliquis 5mg BID     #Calf Tenderness/pain b/l feet  - BL LE doppler negative 5/21   --Apply offloading boots when OOB      #L hip pain - resolved  - no acute findings on XR or CT   - L hip MRI (5/7) no significant findings     #HTN  - Amlodipine 10mg dialy     #Sleep/Mood  - Melatonin 9mg at HS   - Quetiapine 25mg at HS     #Skin  Wound Recs per Plastic Surgery (5/16):  Sacral Pressure Injury: BID- Cleanse with NS, remove excess fluid, apply santyl ointment to sloughing tissues, pack wound with 1-inch dry plain packing, cover with 4 inch gauze and abd combine  - R buttock: BID- Cleanse with NS, overlay calcium alginate, ABD combine dressing  Additional wound care instructions:  -->Right anterior thigh to groin isolated wound: Clean wound and periwound skin with NS. Pat dry. Apply liquid barrier film to periwound skin, allow to dry. Cover with silicone foam with border. Change daily or PRN if soiled   --> Bilateral abdominal pannus, bilateral groin: Cleanse with luke-warm soap and water, dry well. Apply clotrimazole  --> Bilateral feet: Apply betadine  to bilateral foot wounds followed by 4x4 gauze, ABD pad, and matilde daily per podiatry.  --> Continue to offload pressure; bariatric low airloss support surface, turn and position per protocol with use of fluidized positioning devices, continue incontinence and moisture care per protocol and use of single breathable incontinence pads, continue to offload heels with fluidized positioning devices. Continue use of bariatric seat cushion, limit sit time- no more than 2 consecutive hours at any given time.  - Pressure injury/Skin: OOB to Chair, PT/OT    - Ammonium lactate to BL LEs     #Neuropathy/sacral ulcer  - Gabapentin 1200mg TID --aim to taper to lower dose    #Pain Mgmt   - Capsaicin cream to BL feet QID - Avoid use on damaged, broken, or irritated skin. Avoid occlusive dressing or heat   - Lidocaine patch to BL thighs   - PRN:; Oxycodone and Tylenol 650mg QID   -celebrex 100mg bid for sacral pain   - neuropathic pain b/l feet c/w   gabapentin    #Morbid obesity  - Most recent weight 394lbs (5/7)     #GI/Bowel Mgmt   - Pantoprazole  - Bisacodyl 10mg at HS   - Miralax   - Naloxegal 25mg daily     #/Bladder Mgmt   #ELLA  - USKB 5/20 negative   - Renvela 800mg TID    #FEN --Morbid obesity (BMI > 40).  - Diet - Regular + Thins  [CCHO]    - MVI     # Health Management:   Environmental challenge affecting dc plan--narrow door and need for ramp to house entrance  However, patient making goal directed effort towards therapy goals     #Precautions / PROPHYLAXIS:   - Falls  - ortho: Weight bearing status: WBAT in surgical shoe   - Lungs: Aspiration, Incentive Spirometer   - DVT: Lovenox  ----------------------------------------------      Function as at 6/2  Ambulating 35 ft  with RW wc follow through min assist (marked improvement)  Stand pivot transfers, bed to wc  Barrier--ulcers on feet, limiting wt bearing,  environmental barrier at home (stairs), severe obesity  Est dc revised to 6/13, issues with insurance, not approving [facility]      ----------------------------------------------  Dr. CANTU's Liaison with Family/Providers:  6/9- Met Patient's father during his visit to the unit during, he expressed happiness with patient's functional progress  We discussed dc planning including plans to address environmental barriers at home  Reports that there is a back entrance through which he could enter the house with limited number of stairs      ----------------------------------------------  OUTPATIENT/FOLLOW UP:    Your Primary Care Provider,   Phone: (   )    -  Fax: (   )    -  Follow Up Time: 1 week    [facility],   [address] [place] [address]  Phone: [phone]  Fax: (   )    -  Follow Up Time:    Dr. Waterhouse  Podiatry  [phone]  Within 1 week       39 year old male with PMH of morbid obesity, BEA on CPAP, pAFIB (not on AC prior to admission), HTN, and BL papilledema attributed to pseudotumor cerebri; who initially presented to  on 2/24 with SOB and BL LE edema. Found to have URI with progressive SOB and multifocal PNA on imaging. His hospital course was complicated by worsening respiratory distress and increased 02 demands secondary to secretions (requiring multiple intubation attempts) and eventual MICU admission. While in MICU, he 02 requirements continued despite optimal vent management. Concern noted for progressive ARDS, unable to prone given morbid obesity, and ultimately cannulated for vvECMO on 2/25 and transferred to [facility] for further management on 2/26.  His hospital course at [facility] was significant for the following: diuretic and ABX management, s/p trache and PEG (placed on 3/7- PEG since removed), RCU admission, high grade fevers (secondary to panniculitis), progressively worsening sacral ulcer (likely osteomyelitis- s/p surgical debridement), BL foot wound (secondary to pressor use), neuropathy (secondary to critical illness polyneuropathy), ELLA (secondary to vancomycin toxicity and overdiuresis- since DCd- with improvement). Patient now admitted for a multidisciplinary rehab program. 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  The patient is having restrictive disease secondary to OHS as indicated by PFTs. Patient is currently hospitalized with diagnosis of acute on chronic hypercapnic respiratory failure secondary to thoracic restrictive disease related/consequent to OHS and/or morbid obesity. Patient demonstrates shortness of breath and accessory muscle use at rest. Despite the nightly use of BIPAP16/5 the patient remained hypercapnic with a PCO2 of 67.  The patient requires volume augmented ventilation with targeted tidal volume not found on a standard BiPAP for home use. The patient would benefit from NIV, otherwise would be at risk for further deterioration, readmissions and possible patient harm.     # Persisting/significant DF weakness AFOs  - Orthotic eval for BL solid AFOs   - This patient has a diagnosis of BL foot drop. Patient is ambulatory. Patient requires a custom molded hinged AFO to preserve ankle motion while stabilizing talar and subtalar joints. Device will give a more natural gait without destabilizing the knee and hip. Device requires a low density lining to protect prominent roderick areas of effected skin. Patient will need the device for a term of greater than 9 months. No reasonable prefabricated orthosis exists.       #Sacral Osteomyelitis  - Zosyn TID - completed on 5/12/25    #Paroxysmal AFIB  - Eliquis 5mg BID   --low dose metoprolol 12.5mg bid (dose reduced 6/10 after RRT for hypotension)     #Calf Tenderness/pain b/l feet  - BL LE doppler negative 5/21   --Apply offloading boots when OOB      #L hip pain - resolved  - no acute findings on XR or CT   - L hip MRI (5/7) no significant findings     #HTN  - Amlodipine 10mg dialy     #Sleep/Mood  - Melatonin 9mg at HS   - Quetiapine 25mg at HS     #Skin  Wound Recs per Plastic Surgery (5/16):  Sacral Pressure Injury: BID- Cleanse with NS, remove excess fluid, apply santyl ointment to sloughing tissues, pack wound with 1-inch dry plain packing, cover with 4 inch gauze and abd combine  - R buttock: BID- Cleanse with NS, overlay calcium alginate, ABD combine dressing  Additional wound care instructions:  -->Right anterior thigh to groin isolated wound: Clean wound and periwound skin with NS. Pat dry. Apply liquid barrier film to periwound skin, allow to dry. Cover with silicone foam with border. Change daily or PRN if soiled   --> Bilateral abdominal pannus, bilateral groin: Cleanse with luke-warm soap and water, dry well. Apply clotrimazole  --> Bilateral feet: Apply betadine  to bilateral foot wounds followed by 4x4 gauze, ABD pad, and matilde daily per podiatry.  --> Continue to offload pressure; bariatric low airloss support surface, turn and position per protocol with use of fluidized positioning devices, continue incontinence and moisture care per protocol and use of single breathable incontinence pads, continue to offload heels with fluidized positioning devices. Continue use of bariatric seat cushion, limit sit time- no more than 2 consecutive hours at any given time.  - Pressure injury/Skin: OOB to Chair, PT/OT    - Ammonium lactate to BL LEs     #Neuropathy/sacral ulcer  - Gabapentin 1200mg TID --aim to taper to lower dose    #Pain Mgmt   - Capsaicin cream to BL feet QID - Avoid use on damaged, broken, or irritated skin. Avoid occlusive dressing or heat   - Lidocaine patch to BL thighs   - PRN:; Oxycodone and Tylenol 650mg QID   -celebrex 100mg bid for sacral pain   - neuropathic pain b/l feet c/w   gabapentin    #Morbid obesity  - Most recent weight 394lbs (5/7)     #GI/Bowel Mgmt   - Pantoprazole  - Bisacodyl 10mg at HS   - Miralax   - Naloxegal 25mg daily     #/Bladder Mgmt   #ELLA  - USKB 5/20 negative   - Renvela 800mg TID    #FEN --Morbid obesity (BMI > 40).  - Diet - Regular + Thins  [CCHO]    - MVI     # Health Management:   Environmental challenge affecting dc plan--narrow door and need for ramp to house entrance  However, patient making goal directed effort towards therapy goals     #Precautions / PROPHYLAXIS:   - Falls  - ortho: Weight bearing status: WBAT in surgical shoe   - Lungs: Aspiration, Incentive Spirometer   - DVT: Lovenox  ----------------------------------------------      Function as at 6/2  Ambulating 35 ft  with RW wc follow through min assist (marked improvement)  Stand pivot transfers, bed to wc  Barrier--ulcers on feet, limiting wt bearing,  environmental barrier at home (stairs), severe obesity  Est dc revised to 6/13, issues with insurance, not approving [facility]      ----------------------------------------------  Dr. CANTU's Liaison with Family/Providers:  6/9- Met Patient's father during his visit to the unit during, he expressed happiness with patient's functional progress  We discussed dc planning including plans to address environmental barriers at home  Reports that there is a back entrance through which he could enter the house with limited number of stairs    6/10--Girlfriend present during review, she contributed during review, including discussion on DC planning, plan for the structural barrier at home (few stairs to entrance)  Patient had agreed to get a ramp, working with his aunt, or alternative plan fo railing for the stairs  Agreed to dc plan for 6/13 to home     ----------------------------------------------  OUTPATIENT/FOLLOW UP:    Your Primary Care Provider,   Phone: (   )    -  Fax: (   )    -  Follow Up Time: 1 week    [facility],   [address] [place] [address]  Phone: [phone]  Fax: (   )    -  Follow Up Time:    Dr. Waterhouse  Podiatry  [phone]  Within 1 week       39 year old male with PMH of morbid obesity, BEA on CPAP, pAFIB (not on AC prior to admission), HTN, and BL papilledema attributed to pseudotumor cerebri; who initially presented to  on 2/24 with SOB and BL LE edema. Found to have URI with progressive SOB and multifocal PNA on imaging. His hospital course was complicated by worsening respiratory distress and increased 02 demands secondary to secretions (requiring multiple intubation attempts) and eventual MICU admission. While in MICU, he 02 requirements continued despite optimal vent management. Concern noted for progressive ARDS, unable to prone given morbid obesity, and ultimately cannulated for vvECMO on 2/25 and transferred to [facility] for further management on 2/26.  His hospital course at [facility] was significant for the following: diuretic and ABX management, s/p trache and PEG (placed on 3/7- PEG since removed), RCU admission, high grade fevers (secondary to panniculitis), progressively worsening sacral ulcer (likely osteomyelitis- s/p surgical debridement), BL foot wound (secondary to pressor use), neuropathy (secondary to critical illness polyneuropathy), ELLA (secondary to vancomycin toxicity and overdiuresis- since DCd- with improvement). Patient now admitted for a multidisciplinary rehab program. 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  The patient is having restrictive disease secondary to OHS as indicated by PFTs. Patient is currently hospitalized with diagnosis of acute on chronic hypercapnic respiratory failure secondary to thoracic restrictive disease related/consequent to OHS and/or morbid obesity. Patient demonstrates shortness of breath and accessory muscle use at rest. Despite the nightly use of BIPAP16/5 the patient remained hypercapnic with a PCO2 of 67.  The patient requires volume augmented ventilation with targeted tidal volume not found on a standard BiPAP for home use. The patient would benefit from NIV, otherwise would be at risk for further deterioration, readmissions and possible patient harm.     # Persisting/significant DF weakness AFOs  - Orthotic eval for BL solid AFOs   - This patient has a diagnosis of BL foot drop. Patient is ambulatory. Patient requires a custom molded hinged AFO to preserve ankle motion while stabilizing talar and subtalar joints. Device will give a more natural gait without destabilizing the knee and hip. Device requires a low density lining to protect prominent roderick areas of effected skin. Patient will need the device for a term of greater than 9 months. No reasonable prefabricated orthosis exists.       #Sacral Osteomyelitis  - Zosyn TID - completed on 5/12/25    #Paroxysmal AFIB  - Eliquis 5mg BID   --low dose metoprolol 12.5mg bid (dose reduced 6/10 after RRT for hypotension)     #Calf Tenderness/pain b/l feet  - BL LE doppler negative 5/21   --Apply offloading boots when OOB      #L hip pain - resolved  - no acute findings on XR or CT   - L hip MRI (5/7) no significant findings     #HTN  - Amlodipine 10mg dialy     #Sleep/Mood  - Melatonin 9mg at HS   - Quetiapine 25mg at HS     #Skin  Wound Recs per Plastic Surgery (5/16):  Sacral Pressure Injury: BID- Cleanse with NS, remove excess fluid, apply santyl ointment to sloughing tissues, pack wound with 1-inch dry plain packing, cover with 4 inch gauze and abd combine  - R buttock: BID- Cleanse with NS, overlay calcium alginate, ABD combine dressing  Additional wound care instructions:  -->Right anterior thigh to groin isolated wound: Clean wound and periwound skin with NS. Pat dry. Apply liquid barrier film to periwound skin, allow to dry. Cover with silicone foam with border. Change daily or PRN if soiled   --> Bilateral abdominal pannus, bilateral groin: Cleanse with luke-warm soap and water, dry well. Apply clotrimazole  --> Bilateral feet: Apply betadine  to bilateral foot wounds followed by 4x4 gauze, ABD pad, and matilde daily per podiatry.  --> Continue to offload pressure; bariatric low airloss support surface, turn and position per protocol with use of fluidized positioning devices, continue incontinence and moisture care per protocol and use of single breathable incontinence pads, continue to offload heels with fluidized positioning devices. Continue use of bariatric seat cushion, limit sit time- no more than 2 consecutive hours at any given time.  - Pressure injury/Skin: OOB to Chair, PT/OT    - Ammonium lactate to BL LEs     #Neuropathy/sacral ulcer  - Gabapentin 1200mg TID --aim to taper to lower dose    #Pain Mgmt   - Capsaicin cream to BL feet QID - Avoid use on damaged, broken, or irritated skin. Avoid occlusive dressing or heat   - Lidocaine patch to BL thighs   - PRN:; Oxycodone and Tylenol 650mg QID   -celebrex 100mg bid for sacral pain   - neuropathic pain b/l feet c/w   gabapentin    #Morbid obesity  - Most recent weight 394lbs (5/7)     #GI/Bowel Mgmt   - Pantoprazole  - Bisacodyl 10mg at HS   - Miralax   - Naloxegal 25mg daily     #/Bladder Mgmt   #ELLA  - USKB 5/20 negative   - Renvela 800mg TID    #FEN --Morbid obesity (BMI > 40).  - Diet - Regular + Thins  [CCHO]    - MVI     # Health Management:   Environmental challenge affecting dc plan--narrow door and need for ramp to house entrance  However, patient making goal directed effort towards therapy goals     #Precautions / PROPHYLAXIS:   - Falls  - ortho: Weight bearing status: WBAT in surgical shoe   - Lungs: Aspiration, Incentive Spirometer   - DVT: apixiban  ----------------------------------------------      Function as at 6/10  Ambulating 75 ft  with RW CS  Barrier--ulcers on feet, limiting wt bearing,  environmental barrier at home (stairs), severe obesity  Est dc 6/13,       ----------------------------------------------  Dr. CANTU's Liaison with Family/Providers:  6/9- Met Patient's father during his visit to the unit during, he expressed happiness with patient's functional progress  We discussed dc planning including plans to address environmental barriers at home  Reports that there is a back entrance through which he could enter the house with limited number of stairs    6/10--Girlfriend present during review, she contributed during review, including discussion on DC planning, plan for the structural barrier at home (few stairs to entrance)  Patient had agreed to get a ramp, working with his aunt, or alternative plan fo railing for the stairs  Agreed to dc plan for 6/13 to home     ----------------------------------------------  OUTPATIENT/FOLLOW UP:    Your Primary Care Provider,   Phone: (   )    -  Fax: (   )    -  Follow Up Time: 1 week    [facility],   [address] [place] [address]  Phone: [phone]  Fax: (   )    -  Follow Up Time:    Dr. Waterhouse  Podiatry  [phone]  Within 1 week       39 year old male with PMH of morbid obesity, BEA on CPAP, pAFIB (not on AC prior to admission), HTN, and BL papilledema attributed to pseudotumor cerebri; who initially presented to  on 2/24 with SOB and BL LE edema. Found to have URI with progressive SOB and multifocal PNA on imaging. His hospital course was complicated by worsening respiratory distress and increased 02 demands secondary to secretions (requiring multiple intubation attempts) and eventual MICU admission. While in MICU, he 02 requirements continued despite optimal vent management. Concern noted for progressive ARDS, unable to prone given morbid obesity, and ultimately cannulated for vvECMO on 2/25 and transferred to [facility] for further management on 2/26.  His hospital course at [facility] was significant for the following: diuretic and ABX management, s/p trache and PEG (placed on 3/7- PEG since removed), RCU admission, high grade fevers (secondary to panniculitis), progressively worsening sacral ulcer (likely osteomyelitis- s/p surgical debridement), BL foot wound (secondary to pressor use), neuropathy (secondary to critical illness polyneuropathy), ELLA (secondary to vancomycin toxicity and overdiuresis- since DCd- with improvement). Patient now admitted for a multidisciplinary rehab program. 04-25-25 @ 15:19    * Continued and remarkable functional improvement, reports pain b/l feet and calves attributes it to effect of increased ambulatory distance - continue Gabapentin  * Persisting/significant DF weakness - await orthotic eval for BL AFOs    * SW following for DC planning and arranging wound care teaching with family   * Interval RRT for exertional dyspnea, hypotension and oxy desaturaton during therapy  *Await new bed mattress     #Acute on Chronic Hypercapnic Respiratory Failure due to Morbid Obesity/OHS in setting of RTD  #Morbid Obesity  #OHS/BEA on Bipap  - Gait Instability, ADL impairments and Functional impairments: start Comprehensive Rehab Program of PT/OT  - 3 hours a day, 5 days a week   - PRN: Nebulizer QID   - Acute on Chronic Hypercapnic Respiratory Failure due to patient morbid obesity (BMI of 56.6) which is causing restriction of the thoracic cage not allowing for proper gas exchange.  The patient is having restrictive disease secondary to OHS as indicated by PFTs. Patient is currently hospitalized with diagnosis of acute on chronic hypercapnic respiratory failure secondary to thoracic restrictive disease related/consequent to OHS and/or morbid obesity. Patient demonstrates shortness of breath and accessory muscle use at rest. Despite the nightly use of BIPAP16/5 the patient remained hypercapnic with a PCO2 of 67.  The patient requires volume augmented ventilation with targeted tidal volume not found on a standard BiPAP for home use. The patient would benefit from NIV, otherwise would be at risk for further deterioration, readmissions and possible patient harm. Patient will need NIV for home.     # Persisting/significant DF weakness AFOs  - Orthotic eval for BL solid AFOs   - This patient has a diagnosis of BL foot drop. Patient is ambulatory. Patient requires a custom molded hinged AFO to preserve ankle motion while stabilizing talar and subtalar joints. Device will give a more natural gait without destabilizing the knee and hip. Device requires a low density lining to protect prominent roderick areas of effected skin. Patient will need the device for a term of greater than 9 months. No reasonable prefabricated orthosis exists.       #Sacral Osteomyelitis  - Zosyn TID - completed on 5/12/25    #Paroxysmal AFIB  - Eliquis 5mg BID   --low dose metoprolol 12.5mg bid (dose reduced 6/10 after RRT for hypotension)     #Calf Tenderness/pain b/l feet  - BL LE doppler negative 5/21   --Apply offloading boots when OOB      #L hip pain - resolved  - no acute findings on XR or CT   - L hip MRI (5/7) no significant findings     #HTN  - Amlodipine 10mg dialy     #Sleep/Mood  - Melatonin 9mg at HS   - Quetiapine 25mg at HS     #Skin  Wound Recs per Plastic Surgery (5/16):  Sacral Pressure Injury: BID- Cleanse with NS, remove excess fluid, apply santyl ointment to sloughing tissues, pack wound with 1-inch dry plain packing, cover with 4 inch gauze and abd combine  - R buttock: BID- Cleanse with NS, overlay calcium alginate, ABD combine dressing  Additional wound care instructions:  -->Right anterior thigh to groin isolated wound: Clean wound and periwound skin with NS. Pat dry. Apply liquid barrier film to periwound skin, allow to dry. Cover with silicone foam with border. Change daily or PRN if soiled   --> Bilateral abdominal pannus, bilateral groin: Cleanse with luke-warm soap and water, dry well. Apply clotrimazole  --> Bilateral feet: Apply betadine  to bilateral foot wounds followed by 4x4 gauze, ABD pad, and matilde daily per podiatry.  --> Continue to offload pressure; bariatric low airloss support surface, turn and position per protocol with use of fluidized positioning devices, continue incontinence and moisture care per protocol and use of single breathable incontinence pads, continue to offload heels with fluidized positioning devices. Continue use of bariatric seat cushion, limit sit time- no more than 2 consecutive hours at any given time.  - Pressure injury/Skin: OOB to Chair, PT/OT    - Ammonium lactate to BL LEs     #Neuropathy/sacral ulcer  - Gabapentin 1200mg TID --aim to taper to lower dose    #Pain Mgmt   - Capsaicin cream to BL feet QID - Avoid use on damaged, broken, or irritated skin. Avoid occlusive dressing or heat   - Lidocaine patch to BL thighs   - PRN:; Oxycodone and Tylenol 650mg QID   -celebrex 100mg bid for sacral pain   - neuropathic pain b/l feet c/w   gabapentin    #Morbid obesity  - Most recent weight 394lbs (5/7)     #GI/Bowel Mgmt   - Pantoprazole  - Bisacodyl 10mg at HS   - Miralax   - Naloxegal 25mg daily     #/Bladder Mgmt   #ELLA  - USKB 5/20 negative   - Renvela 800mg TID    #FEN --Morbid obesity (BMI > 40).  - Diet - Regular + Thins  [facility] [place]    - MVI     # Health Management:   Environmental challenge affecting dc plan--narrow door and need for ramp to house entrance  However, patient making goal directed effort towards therapy goals     #Precautions / PROPHYLAXIS:   - Falls  - ortho: Weight bearing status: WBAT in surgical shoe   - Lungs: Aspiration, Incentive Spirometer   - DVT: apixiban  ----------------------------------------------      Function as at 6/10  Ambulating 75 ft  with RW CS  Barrier--ulcers on feet, limiting wt bearing,  environmental barrier at home (stairs), severe obesity  Est dc 6/13,       ----------------------------------------------  Dr. CANTU's Liaison with Family/Providers:  6/9- Met Patient's father during his visit to the unit during, he expressed happiness with patient's functional progress  We discussed dc planning including plans to address environmental barriers at home  Reports that there is a back entrance through which he could enter the house with limited number of stairs    6/10--Girlfriend present during review, she contributed during review, including discussion on DC planning, plan for the structural barrier at home (few stairs to entrance)  Patient had agreed to get a ramp, working with his aunt, or alternative plan fo railing for the stairs  Agreed to dc plan for 6/13 to home     ----------------------------------------------  OUTPATIENT/FOLLOW UP:    Your Primary Care Provider,   Phone: (   )    -  Fax: (   )    -  Follow Up Time: 1 week    [facility],   [address] [place] [address]  Phone: [phone]  Fax: (   )    -  Follow Up Time:    Dr. Waterhouse  Podiatry  [phone]  Within 1 week

## 2025-06-10 NOTE — RAPID RESPONSE TEAM SUMMARY - NSADDTLFINDINGSRRT_GEN_ALL_CORE
Patient A0x4. Denies chest pain, and SOB. Patient admits to not eating or hydrating appropriately prior to therapy.

## 2025-06-10 NOTE — ADVANCED PRACTICE NURSE CONSULT - RECOMMEDATIONS
Recommendations: Cleanse wounds with 0.9% NS, pat dry with clean guaze.  Apply Cavilon to macerated periwound.  Apply Hydrocolloid to be changed 3-5 days.  Sacral wound: apply Santyl to Aquacel Ag Advantage ribbon and insert in tunneled areas.  Apply a small Aquacel Ag Extra to opening of wound and cover with ABD pad and secure with micro tape, and use window dressing technique.

## 2025-06-10 NOTE — PROGRESS NOTE ADULT - SUBJECTIVE AND OBJECTIVE BOX
Subjective  Patient seen and examined this AM with therapy.   Girlfriend present during encounter.  Admits to sleeping well. Significantly improved ROM, and overall function  No pain over sacral wound  But nursing report that minimal drainage still apparent during wound care  Enforced need for wound care teaching with family, and dc planning      ROS--no acute pain, no fever or chest pain   B/l leg and sacral ulcers,  LBM 6/8    Function   -Ambulated 1x10' and 2x30' trials with bariatric RW needing CGA incorporating  turns from surface to surface to simulate household ambulation. Steppage gait  pattern used today while wearing sneakers due to weak ankle DF/foot drop    Vital Signs Last 24 Hrs  T(C): 36.7 (10 Nayan 2025 08:35), Max: 36.9 (09 Jun 2025 23:00)  T(F): 98 (10 Nayan 2025 08:35), Max: 98.4 (09 Jun 2025 23:00)  HR: 87 (10 Nayan 2025 08:35) (87 - 97)  BP: 111/73 (10 Nayan 2025 08:35) (111/73 - 120/78)  BP(mean): --  RR: 16 (10 Nayan 2025 08:35) (16 - 16)  SpO2: 94% (10 Nayan 2025 08:35) (94% - 95%)      EXAM  Gen - Comfortable, cheerful, motivated   HEENT - NAD  Neck - Supple, No limited ROM  Pulm - Clear  Cardiovascular - RRR, S1S2, No murmurs  Abdomen - Soft, non tender, +BS  Extremities - chronic skin changes with hyperpigmentation at shin, callus on feet, responding to treatment   Ulcer on both feet examined, healthy scab noted     Neuro-  AAO X 3, Speech is normal     Motor - UE 5/5 Hip flexion 3/5, Knee 4/5 PF 4/5, DF 2/5     Sensory - Decreased to light touch bilateral lower extremities      Coordination - impaired lower extremities   Psychiatric - Mood stable, Affect WNL    Skin:  R foot plantar aspect lateral/distal side 7cm x 5.5cm ischemic wound from pressors  RLE 4th digit 1x1.5cm ischemic induced wound from pressors  RLE 5th digit 3x2cm ischemic induced wound from pressors  L foot plantar aspect lateral/distal side 6x7cm ischemic induced wound from pressor usage  LLE 4th digit 2x1cm ischemic induced wound from pressors  LLE 5th digit 2.5x3cm ischemic induced wound from pressors   Unstageable pressure wound cocyx 7cmx4.5cm w/ 2.5cm depth  R buttock pressure induced wound semi contiguous with coccyx wound calling unstageable given prior debridement 8.5x5.5cm  L buttock 1.0x0.5cm stage 3 pressure injury  R buttock skin tear 3.5cm x 0.5cm and 1.5cmx0.5cm  Dry skin with callus on sole of foot     MMS--antigravity upper extremities,    improved strength        LABS:                        9.5    5.86  )-----------( 400      ( 09 Jun 2025 06:30 )             32.6     06-09    144  |  105  |  13  ----------------------------<  96  4.0   |  31  |  1.18    Ca    9.3      09 Jun 2025 06:30    TPro  7.3  /  Alb  2.2[L]  /  TBili  0.3  /  DBili  x   /  AST  22  /  ALT  19  /  AlkPhos  58  06-09      Urinalysis Basic - ( 09 Jun 2025 06:30 )    Color: x / Appearance: x / SG: x / pH: x  Gluc: 96 mg/dL / Ketone: x  / Bili: x / Urobili: x   Blood: x / Protein: x / Nitrite: x   Leuk Esterase: x / RBC: x / WBC x   Sq Epi: x / Non Sq Epi: x / Bacteria: x        CAPILLARY BLOOD GLUCOSE      MEDICATIONS  (STANDING):  ammonium lactate 12% Lotion 1 Application(s) Topical two times a day  apixaban 5 milliGRAM(s) Oral two times a day  ascorbic acid 500 milliGRAM(s) Oral daily  bisacodyl 10 milliGRAM(s) Oral at bedtime  capsaicin 0.025% Cream 1 Application(s) Topical four times a day  clotrimazole 1% Cream 1 Application(s) Topical <User Schedule>  collagenase Ointment 1 Application(s) Topical two times a day  Dakins Solution - 1/4 Strength 1 Application(s) Topical two times a day  dextrose 5%. 1000 milliLiter(s) (50 mL/Hr) IV Continuous <Continuous>  dextrose 5%. 1000 milliLiter(s) (100 mL/Hr) IV Continuous <Continuous>  dextrose 50% Injectable 25 Gram(s) IV Push once  dextrose 50% Injectable 12.5 Gram(s) IV Push once  dextrose 50% Injectable 25 Gram(s) IV Push once  gabapentin 1200 milliGRAM(s) Oral every 8 hours  glucagon  Injectable 1 milliGRAM(s) IntraMuscular once  influenza   Vaccine 0.5 milliLiter(s) IntraMuscular once  lidocaine   4% Patch 1 Patch Transdermal daily  lidocaine   4% Patch 1 Patch Transdermal daily  magnesium oxide 400 milliGRAM(s) Oral three times a day with meals  melatonin 9 milliGRAM(s) Oral at bedtime  metoprolol tartrate 25 milliGRAM(s) Oral two times a day  multivitamin 1 Tablet(s) Oral daily  mupirocin 2% Ointment 1 Application(s) Topical two times a day  naloxegol 25 milliGRAM(s) Oral daily  pantoprazole    Tablet 40 milliGRAM(s) Oral before breakfast  petrolatum white Ointment 1 Application(s) Topical every 8 hours  polyethylene glycol 3350 17 Gram(s) Oral two times a day  povidone iodine 10% Solution 1 Application(s) Topical daily  QUEtiapine 25 milliGRAM(s) Oral at bedtime  sevelamer carbonate 800 milliGRAM(s) Oral three times a day with meals  silver nitrate Applicator 6 Application(s) Topical once  tamsulosin 0.8 milliGRAM(s) Oral at bedtime  zinc sulfate 220 milliGRAM(s) Oral daily    MEDICATIONS  (PRN):  albuterol/ipratropium for Nebulization 3 milliLiter(s) Nebulizer every 6 hours PRN Shortness of Breath and/or Wheezing  dextrose Oral Gel 15 Gram(s) Oral once PRN Blood Glucose LESS THAN 70 milliGRAM(s)/deciliter  oxyCODONE    IR 20 milliGRAM(s) Oral every 6 hours PRN Severe Pain (7 - 10)  oxyCODONE    IR 15 milliGRAM(s) Oral two times a day PRN Moderate Pain (4 - 6)  oxyCODONE    IR 10 milliGRAM(s) Oral two times a day PRN Mild Pain (1 - 3)     Subjective  Patient seen and examined this AM with therapy.   Girlfriend present during encounter.  Admits to sleeping well. Significantly improved ROM, and overall function  No pain over sacral wound  But nursing report that minimal drainage still apparent during wound care  Enforced need for wound care teaching with family, and dc planning      ROS--no acute pain, no fever or chest pain   B/l leg and sacral ulcers,  LBM 6/9    Function  (improved ambulatory distance from 30 ft to 75ft)  -Ambulated 75' x 2 trials with bariatric RW and wheelchair follow needing Close  Supervision stand by assist. Steppage gait pattern used today while wearing  sneakers due to weak ankle DF/foot drop.    Therapeutic Activity  -Completed lower body dressing donning pants and sneakers prior to exiting bed  -Rolling to sitting edge of bed needing supervision  -Sit to stand with RW close supervision, SPT with RW close supervision    Vital Signs Last 24 Hrs  T(C): 36.7 (10 Nayan 2025 08:35), Max: 36.9 (09 Jun 2025 23:00)  T(F): 98 (10 Nayan 2025 08:35), Max: 98.4 (09 Jun 2025 23:00)  HR: 87 (10 Nayan 2025 08:35) (87 - 97)  BP: 111/73 (10 Nayan 2025 08:35) (111/73 - 120/78)  RR: 16 (10 Nayan 2025 08:35) (16 - 16)  SpO2: 94% (10 Nayan 2025 08:35) (94% - 95%)      EXAM  Gen - Comfortable, cheerful, motivated   HEENT - NAD  Neck - Supple, No limited ROM  Pulm - Clear  Cardiovascular - RRR, S1S2, No murmurs  Abdomen - Soft, non tender, +BS  Extremities - chronic skin changes with hyperpigmentation at shin, callus on feet, responding to treatment   Ulcer on both feet examined, healthy scab noted     Neuro-  AAO X 3, Speech is normal     Motor - UE 5/5 Hip flexion 3/5, Knee 4/5 PF 4/5, DF 2/5     Sensory - Decreased to light touch bilateral lower extremities      Coordination - impaired lower extremities   Psychiatric - Mood stable, Affect WNL    Skin:  R foot plantar aspect lateral/distal side 7cm x 5.5cm ischemic wound from pressors  RLE 4th digit 1x1.5cm ischemic induced wound from pressors  RLE 5th digit 3x2cm ischemic induced wound from pressors  L foot plantar aspect lateral/distal side 6x7cm ischemic induced wound from pressor usage  LLE 4th digit 2x1cm ischemic induced wound from pressors  LLE 5th digit 2.5x3cm ischemic induced wound from pressors   Unstageable pressure wound cocyx 7cmx4.5cm w/ 2.5cm depth  R buttock pressure induced wound semi contiguous with coccyx wound calling unstageable given prior debridement 8.5x5.5cm  L buttock 1.0x0.5cm stage 3 pressure injury  R buttock skin tear 3.5cm x 0.5cm and 1.5cmx0.5cm  Dry skin with callus on sole of foot     MMS--antigravity upper extremities,    improved strength        LABS:                        9.5    5.86  )-----------( 400      ( 09 Jun 2025 06:30 )             32.6     06-09    144  |  105  |  13  ----------------------------<  96  4.0   |  31  |  1.18    Ca    9.3      09 Jun 2025 06:30    TPro  7.3  /  Alb  2.2[L]  /  TBili  0.3  /  DBili  x   /  AST  22  /  ALT  19  /  AlkPhos  58  06-09      Urinalysis Basic - ( 09 Jun 2025 06:30 )    Color: x / Appearance: x / SG: x / pH: x  Gluc: 96 mg/dL / Ketone: x  / Bili: x / Urobili: x   Blood: x / Protein: x / Nitrite: x   Leuk Esterase: x / RBC: x / WBC x   Sq Epi: x / Non Sq Epi: x / Bacteria: x        CAPILLARY BLOOD GLUCOSE      MEDICATIONS  (STANDING):  ammonium lactate 12% Lotion 1 Application(s) Topical two times a day  apixaban 5 milliGRAM(s) Oral two times a day  ascorbic acid 500 milliGRAM(s) Oral daily  bisacodyl 10 milliGRAM(s) Oral at bedtime  capsaicin 0.025% Cream 1 Application(s) Topical four times a day  clotrimazole 1% Cream 1 Application(s) Topical <User Schedule>  collagenase Ointment 1 Application(s) Topical two times a day  Dakins Solution - 1/4 Strength 1 Application(s) Topical two times a day  dextrose 5%. 1000 milliLiter(s) (50 mL/Hr) IV Continuous <Continuous>  dextrose 5%. 1000 milliLiter(s) (100 mL/Hr) IV Continuous <Continuous>  dextrose 50% Injectable 25 Gram(s) IV Push once  dextrose 50% Injectable 12.5 Gram(s) IV Push once  dextrose 50% Injectable 25 Gram(s) IV Push once  gabapentin 1200 milliGRAM(s) Oral every 8 hours  glucagon  Injectable 1 milliGRAM(s) IntraMuscular once  influenza   Vaccine 0.5 milliLiter(s) IntraMuscular once  lidocaine   4% Patch 1 Patch Transdermal daily  lidocaine   4% Patch 1 Patch Transdermal daily  magnesium oxide 400 milliGRAM(s) Oral three times a day with meals  melatonin 9 milliGRAM(s) Oral at bedtime  metoprolol tartrate 25 milliGRAM(s) Oral two times a day  multivitamin 1 Tablet(s) Oral daily  mupirocin 2% Ointment 1 Application(s) Topical two times a day  naloxegol 25 milliGRAM(s) Oral daily  pantoprazole    Tablet 40 milliGRAM(s) Oral before breakfast  petrolatum white Ointment 1 Application(s) Topical every 8 hours  polyethylene glycol 3350 17 Gram(s) Oral two times a day  povidone iodine 10% Solution 1 Application(s) Topical daily  QUEtiapine 25 milliGRAM(s) Oral at bedtime  sevelamer carbonate 800 milliGRAM(s) Oral three times a day with meals  silver nitrate Applicator 6 Application(s) Topical once  tamsulosin 0.8 milliGRAM(s) Oral at bedtime  zinc sulfate 220 milliGRAM(s) Oral daily    MEDICATIONS  (PRN):  albuterol/ipratropium for Nebulization 3 milliLiter(s) Nebulizer every 6 hours PRN Shortness of Breath and/or Wheezing  dextrose Oral Gel 15 Gram(s) Oral once PRN Blood Glucose LESS THAN 70 milliGRAM(s)/deciliter  oxyCODONE    IR 20 milliGRAM(s) Oral every 6 hours PRN Severe Pain (7 - 10)  oxyCODONE    IR 15 milliGRAM(s) Oral two times a day PRN Moderate Pain (4 - 6)  oxyCODONE    IR 10 milliGRAM(s) Oral two times a day PRN Mild Pain (1 - 3)

## 2025-06-10 NOTE — RAPID RESPONSE TEAM SUMMARY - NSSITUATIONBACKGROUNDRRT_GEN_ALL_CORE
Patient was in therapy noted to be diaphoretic, hypotensive, hypoxic, tachycardia, with flushing sensation. Per therapist, patient began to appear pale. Patient was taken back to room and found to have worsening hypotension (SBP in 60s). RRT called.

## 2025-06-10 NOTE — PROVIDER CONTACT NOTE (OTHER) - SITUATION
Patient complains of right abdominal pain when during to the right side, no pain when palpating
Patient hypotensive, tachycardic, diaphoretic and hypoxic during therapy and continued to be in this state when brought back into room

## 2025-06-10 NOTE — ADVANCED PRACTICE NURSE CONSULT - ASSESSMENT
AA&Ox4, vss, a-febrile (see flow sheet) Lab 6/10/25 wbc 5.86.  Admitted to rehab for critical illness myopathy (see H&P).  Right buttock stage 3 measured at: 5.3 x 3.7 x 0 cm with 100% granulation tissue (pink-red).  Wound margins macerated, <0.2cm, with surrounding granulation tissue.  No drainage, noted. Sacral pressure injury sacrum measures at 7.0 x 3.0 x 5.6 cm with tunneling at 1 o'clock 9.2cm, 3 o'clock 7.9 cm and 5 o'clock 7.4 cm. Wound margins macerated <0.3 cm.  Surround tissue from opening of pressure injury boggy 1cm.  Moderate to heavy exudate.  Cleansed wounds with 0.9 % NS, pat dried with guaze.  The right buttock wound: applied Cavilon to periwound then covered wound with Hydrocolloid dressing.  Sacral wound tunneling sites, cleanses with 1/4 Dankin's solution to tunneled sites and Iodoform packing to tunneling.

## 2025-06-10 NOTE — PROGRESS NOTE ADULT - ASSESSMENT
39 year old male  with PMH of morbid obesity, BEA, pAFIB (not on AC), HTN, and BL papilledema attributed to pseudotumor cerebri; who initially presented to  on 2/24 with SOB and BL LE edema. Found to have URI with progressive SOB and multifocal PNA on imaging. His hospital course was complicated by worsening respiratory distress and increased O2 demands secondary to secretions (requiring multiple intubation attempts) and eventual MICU admission. While in MICU, his O2 requirements continued despite optimal vent management. Concern noted for progressive ARDS, unable to prone given morbid obesity, and ultimately cannulated for vvECMO on 2/25 and transferred to [facility] [place] for further management on 2/26. His hospital course at [facility] was significant for the following: diuretic and ABX management, s/p trach and PEG (placed on 3/7- PEG since removed), RCU admission, high grade fevers (secondary to panniculitis), progressively worsening sacral ulcer (likely osteomyelitis- s/p surgical debridement), BL foot wound (secondary to pressor use), neuropathy (secondary to critical illness polyneuropathy), ELLA (secondary to vancomycin toxicity and overdiuresis- since DCd- with improvement). Patient now admitted for a multidisciplinary rehab program.     # Rapid response 6/10/25  +OH with diaphoresis, tachycardia and SBP in 60's upon return to room  - BP now improved w. leg eleavtion.   - obtain IV site, fluid bolus 500ml LR.  - d/c flomax  - reduced BB with holding parameters  - EKG personally reviewed and appears sinus tach 100. NO ischemic changes  was hypoxic initially but denies sxs. obtain chest xr.  labs considered; no signs of bleeding. suspect low volume, pt skipped breakfast. defer labs to primary team.      #Left hip pain - Improving  -Hip xray--> no fracture  -CT hip: Again seen is a sacral decubitus wound which extends superficial as well as involving the right gluteus musculature and the posterior L5 with small amount of fluid and gas within the tract. Limited involvement on this noncontrast CT. This likely corresponds to adjacent phlegmonous changes  -MRI left hip: Incompletely characterized sacral wound with associated ill-defined collection of fluid and soft tissue gas which extends to/involve the right gluteus zurdo muscle as at CT pelvis study from one day prior. No interval change in the high STIR signal within the bilateral gluteal and thigh musculature suggestive of a myositis as compared to prior MRI study. No fracture, osseous destruction or aggressive periosteal reaction. No osteomyelitis at the left hip. Edema-like signal along the right and left greater trochanteric regions, overall increased as compared to the prior MRI study from 4/11/2025. Findings are felt to be reactive in etiology.  -Continue comprehensive rehab program - PT/OT/SLP per rehab team  -Pain management, bowel regimen per rehab     #Abnormal CT A/P  -5/2: CT A/P: Question of pneumatosis in the cecum with no other evidence of bowel ischemia. Correlation with lactate levels and clinical exam would be helpful.  -Patient has no symptoms. Abdomen is benign. Tolerating PO  -5/2: Surgery cx noted: no intervention. c/w PO. refer to bariatric surgery post-DC dr botello at [facility] or Dr. Segovia at [facility] [per patient request]  -5/3 GI cx noted: no GI intervention planned. c/w PO    #Stage IV Sacral Decub Ulcer  #Bilateral Buttocks Wounds  #Bilateral Foot Wounds  -5/2 CT A/P: An air and fluid containing collection in the right gluteus zurdo muscle in continuity with a subcutaneous sacral collection. No obvious skin defect to suggest a decubitus ulcer.  -5/7: MRI left hip: Incompletely characterized sacral wound with associated ill-defined collection of fluid and soft tissue gas which extends to/involve the right gluteus zurdo muscle as at CT pelvis study from one day prior. No interval change in the high STIR signal within the bilateral gluteal and thigh musculature suggestive of a myositis as compared to prior MRI study. No fracture, osseous destruction or aggressive periosteal reaction. No osteomyelitis at the left hip. Edema-like signal along the right and left greater trochanteric regions, overall increased as compared to the prior MRI study from 4/11/2025. Findings are felt to be reactive in etiology..  -Continue local wound care  -MVI, vitamin C and zinc   -Off load pressure  -Plastic consult following  -ID follow up noted and appreciated  -Per plastics - continue current management    #Fungemia 2/2 Panniculitis - resolved  -Blood culture 3/15, 3/16 with candida albicans, s/p course of antifungals   -Repeat cultures negative since 3/18    #Debility Secondary to Acute Hypoxic Respiratory Failure/ARDS 2/2 PNA  #BEA (Not on CPAP)  #Critical Illness Polyneuropathy   -s/p VV ECMO, s/p diuresis, TTE/ROBERT with RV failure, s/p treatment for pneumonia  -Repeat Echo 3/11 showed EF 66 %. RV is not well visualized, enlarged RV cavity size a/ reduced RV systolic function. No significant valvular disease.  No echocardiographic evidence of pulmonary hypertension  -s/p Trach (decannulated 3/28) and PEG (removed 4/15)   -Saturating well on RA. Continue duoneb prn  -BIPAP qhs (FiO2 40%, 18/10) via nasal mask   -Fall precaution  -Pain control per rehab team   6/10: recurrent hypoxia while orthostatic: use 2L nc to keep sao2>92%. obtain chest xr.     #p-AFIB  #Sinus Tachycardia (Resolved)  -Patient has intermittent tachycardia. Per patient, His HR  mostly above 110 even when he is not sick. follows with cardio OP  -5/3: EKG: NSR@94 bpm  -Eliquis   -Off norvasc  -Continue metoprolol, reduced dose to 25mg BID given soft BP (6/4)  -TSH WNL 2/25  -Sinus tachycardia likely multifactorial including pain and deconditioning. Low suspicion for PE, no SOB/hypoxia and he is already on full AC  - 6/10 ekg sinus. BB dose decreased d/t OH.    #RIJ and Bilateral LE DVT  -Duplex RUE 3/14 showed Acute, non-occlusive deep vein thrombosis noted within the right internal jugular vein. Superficial vein thrombosis noted within the right antecubital cephalic vein  -Duplex LE 3/14 showed Acute, occlusive deep vein thromboses noted within the right and left peroneal veins at both mid calves. Age-indeterminate, occlusive deep vein thrombosis visualized within the left gastrocnemius vein at the proximal calf.  -Continue eliquis  -Repeat duplex 4/29 showed No evidence of deep venous thrombosis in either lower extremity.    #Normocytic Anemia - Likely multifactorial  -H/H stable, no evidence of active bleed   -Monitor CBC    #HTN  -Amlodipine d/melly  -Continue Metoprolol, reduced dose to 25mg BID with holding parameters  -Monitor BP     #History of Pseudotumor Cerebri   -Outpatient follow up     #ELLA - resolved  -Baseline Cr appears to be ~0.8 range   -ELLA felt to be multifactorial in setting of cardiorenal w/ RVE, recurrent ELLA suspected due to vancomycin toxicity and diuresis   -Continue to encourage oral hydration   -Renvela   -Low phos diet  -Monitor BMP and phos level     #Type 2 Diabetes, HbA1C 4.9 (4/28)  -FS wnl. Monitor glucose on routine lab, controlled     #Urinary Retention  -Flomax 5/1  -Renal u/s 5/20 unremarkable    #Mood  -Continue Seroquel    #DVT ppx - Eliquis  #GI ppx - PPI    Discussed with rehab team at bedside.

## 2025-06-11 PROCEDURE — 99233 SBSQ HOSP IP/OBS HIGH 50: CPT

## 2025-06-11 RX ORDER — CEFTRIAXONE 500 MG/1
1000 INJECTION, POWDER, FOR SOLUTION INTRAMUSCULAR; INTRAVENOUS EVERY 24 HOURS
Refills: 0 | Status: DISCONTINUED | OUTPATIENT
Start: 2025-06-11 | End: 2025-06-13

## 2025-06-11 RX ORDER — SODIUM CHLORIDE 9 G/1000ML
500 INJECTION, SOLUTION INTRAVENOUS
Refills: 0 | Status: DISCONTINUED | OUTPATIENT
Start: 2025-06-11 | End: 2025-06-13

## 2025-06-11 RX ORDER — CEFTRIAXONE 500 MG/1
1000 INJECTION, POWDER, FOR SOLUTION INTRAMUSCULAR; INTRAVENOUS EVERY 24 HOURS
Refills: 0 | Status: DISCONTINUED | OUTPATIENT
Start: 2025-06-11 | End: 2025-06-11

## 2025-06-11 RX ADMIN — CEFTRIAXONE 100 MILLIGRAM(S): 500 INJECTION, POWDER, FOR SOLUTION INTRAMUSCULAR; INTRAVENOUS at 22:12

## 2025-06-11 RX ADMIN — QUETIAPINE FUMARATE 25 MILLIGRAM(S): 25 TABLET ORAL at 22:23

## 2025-06-11 RX ADMIN — APIXABAN 5 MILLIGRAM(S): 2.5 TABLET, FILM COATED ORAL at 06:32

## 2025-06-11 RX ADMIN — GABAPENTIN 1200 MILLIGRAM(S): 400 CAPSULE ORAL at 06:31

## 2025-06-11 RX ADMIN — Medication 1 APPLICATION(S): at 17:04

## 2025-06-11 RX ADMIN — SEVELAMER HYDROCHLORIDE 800 MILLIGRAM(S): 800 TABLET ORAL at 12:02

## 2025-06-11 RX ADMIN — Medication 400 MILLIGRAM(S): at 13:05

## 2025-06-11 RX ADMIN — SEVELAMER HYDROCHLORIDE 800 MILLIGRAM(S): 800 TABLET ORAL at 08:10

## 2025-06-11 RX ADMIN — Medication 220 MILLIGRAM(S): at 13:06

## 2025-06-11 RX ADMIN — SEVELAMER HYDROCHLORIDE 800 MILLIGRAM(S): 800 TABLET ORAL at 17:07

## 2025-06-11 RX ADMIN — OXYCODONE HYDROCHLORIDE 20 MILLIGRAM(S): 30 TABLET ORAL at 17:57

## 2025-06-11 RX ADMIN — Medication 400 MILLIGRAM(S): at 08:07

## 2025-06-11 RX ADMIN — Medication 1 TABLET(S): at 13:06

## 2025-06-11 RX ADMIN — OXYCODONE HYDROCHLORIDE 20 MILLIGRAM(S): 30 TABLET ORAL at 17:07

## 2025-06-11 RX ADMIN — GABAPENTIN 1200 MILLIGRAM(S): 400 CAPSULE ORAL at 22:23

## 2025-06-11 RX ADMIN — GABAPENTIN 1200 MILLIGRAM(S): 400 CAPSULE ORAL at 13:05

## 2025-06-11 RX ADMIN — SODIUM CHLORIDE 100 MILLILITER(S): 9 INJECTION, SOLUTION INTRAVENOUS at 18:38

## 2025-06-11 RX ADMIN — METOPROLOL SUCCINATE 12.5 MILLIGRAM(S): 50 TABLET, EXTENDED RELEASE ORAL at 06:32

## 2025-06-11 RX ADMIN — SODIUM HYPOCHLORITE 1 APPLICATION(S): 0.12 SOLUTION TOPICAL at 06:31

## 2025-06-11 RX ADMIN — Medication 1 APPLICATION(S): at 06:31

## 2025-06-11 RX ADMIN — APIXABAN 5 MILLIGRAM(S): 2.5 TABLET, FILM COATED ORAL at 17:04

## 2025-06-11 RX ADMIN — NALOXEGOL OXALATE 25 MILLIGRAM(S): 12.5 TABLET, FILM COATED ORAL at 17:03

## 2025-06-11 RX ADMIN — Medication 9 MILLIGRAM(S): at 22:23

## 2025-06-11 RX ADMIN — OXYCODONE HYDROCHLORIDE 15 MILLIGRAM(S): 30 TABLET ORAL at 23:23

## 2025-06-11 RX ADMIN — Medication 500 MILLIGRAM(S): at 13:06

## 2025-06-11 RX ADMIN — Medication 40 MILLIGRAM(S): at 06:33

## 2025-06-11 RX ADMIN — Medication 400 MILLIGRAM(S): at 17:03

## 2025-06-11 RX ADMIN — OXYCODONE HYDROCHLORIDE 15 MILLIGRAM(S): 30 TABLET ORAL at 22:23

## 2025-06-11 RX ADMIN — METOPROLOL SUCCINATE 12.5 MILLIGRAM(S): 50 TABLET, EXTENDED RELEASE ORAL at 17:03

## 2025-06-11 NOTE — PROGRESS NOTE ADULT - SUBJECTIVE AND OBJECTIVE BOX
Subjective  Patient seen and examined at bedside this afternoon.   Admits to sleeping well. Significantly improved ROM, and overall function  No pain over sacral wound.  Plan for DC home on 6/13.     ROS--no acute pain, no fever or chest pain   B/l leg and sacral ulcers,  LBM 6/10    Function  (improved ambulatory distance from 30 ft to 75ft)  -Ambulated 75' x 2 trials with bariatric RW and wheelchair follow needing Close  Supervision stand by assist. Steppage gait pattern used today while wearing  sneakers due to weak ankle DF/foot drop.    Therapeutic Activity  -Completed lower body dressing donning pants and sneakers prior to exiting bed  -Rolling to sitting edge of bed needing supervision  -Sit to stand with RW close supervision, SPT with RW close supervision    Vital Signs Last 24 Hrs  T(C): 36.8 (11 Jun 2025 08:08), Max: 37.2 (10 Nayan 2025 20:22)  T(F): 98.2 (11 Jun 2025 08:08), Max: 98.9 (10 Nayan 2025 20:22)  HR: 112 (11 Jun 2025 09:53) (95 - 112)  BP: 117/70 (11 Jun 2025 09:53) (91/57 - 123/75)  BP(mean): --  RR: 16 (11 Jun 2025 09:53) (16 - 16)  SpO2: 96% (11 Jun 2025 09:53) (93% - 98%)      EXAM  Gen - Comfortable, cheerful, motivated   HEENT - NAD  Neck - Supple, No limited ROM  Pulm - Clear  Cardiovascular - RRR, S1S2, No murmurs  Abdomen - Soft, non tender, +BS  Extremities - chronic skin changes with hyperpigmentation at shin, callus on feet, responding to treatment   Ulcer on both feet examined, healthy scab noted     Neuro-  AAO X 3, Speech is normal     Motor - UE 5/5 Hip flexion 3/5, Knee 4/5 PF 4/5, DF 2/5     Sensory - Decreased to light touch bilateral lower extremities      Coordination - impaired lower extremities   Psychiatric - Mood stable, Affect WNL    Skin:  R foot plantar aspect lateral/distal side 7cm x 5.5cm ischemic wound from pressors  RLE 4th digit 1x1.5cm ischemic induced wound from pressors  RLE 5th digit 3x2cm ischemic induced wound from pressors  L foot plantar aspect lateral/distal side 6x7cm ischemic induced wound from pressor usage  LLE 4th digit 2x1cm ischemic induced wound from pressors  LLE 5th digit 2.5x3cm ischemic induced wound from pressors   Unstageable pressure wound cocyx 7cmx4.5cm w/ 2.5cm depth  R buttock pressure induced wound semi contiguous with coccyx wound calling unstageable given prior debridement 8.5x5.5cm  L buttock 1.0x0.5cm stage 3 pressure injury  R buttock skin tear 3.5cm x 0.5cm and 1.5cmx0.5cm  Dry skin with callus on sole of foot     MMS--antigravity upper extremities,    improved strength        LABS:                        9.5    5.86  )-----------( 400      ( 09 Jun 2025 06:30 )             32.6     06-09    144  |  105  |  13  ----------------------------<  96  4.0   |  31  |  1.18    Ca    9.3      09 Jun 2025 06:30    TPro  7.3  /  Alb  2.2[L]  /  TBili  0.3  /  DBili  x   /  AST  22  /  ALT  19  /  AlkPhos  58  06-09      Urinalysis Basic - ( 09 Jun 2025 06:30 )    Color: x / Appearance: x / SG: x / pH: x  Gluc: 96 mg/dL / Ketone: x  / Bili: x / Urobili: x   Blood: x / Protein: x / Nitrite: x   Leuk Esterase: x / RBC: x / WBC x   Sq Epi: x / Non Sq Epi: x / Bacteria: x        CAPILLARY BLOOD GLUCOSE        MEDICATIONS  (STANDING):  ammonium lactate 12% Lotion 1 Application(s) Topical two times a day  apixaban 5 milliGRAM(s) Oral two times a day  ascorbic acid 500 milliGRAM(s) Oral daily  bisacodyl 10 milliGRAM(s) Oral at bedtime  capsaicin 0.025% Cream 1 Application(s) Topical four times a day  cefTRIAXone   IVPB 1000 milliGRAM(s) IV Intermittent every 24 hours  clotrimazole 1% Cream 1 Application(s) Topical <User Schedule>  collagenase Ointment 1 Application(s) Topical two times a day  Dakins Solution - 1/4 Strength 1 Application(s) Topical two times a day  dextrose 5%. 1000 milliLiter(s) (50 mL/Hr) IV Continuous <Continuous>  dextrose 5%. 1000 milliLiter(s) (100 mL/Hr) IV Continuous <Continuous>  dextrose 50% Injectable 25 Gram(s) IV Push once  dextrose 50% Injectable 12.5 Gram(s) IV Push once  dextrose 50% Injectable 25 Gram(s) IV Push once  gabapentin 1200 milliGRAM(s) Oral every 8 hours  glucagon  Injectable 1 milliGRAM(s) IntraMuscular once  influenza   Vaccine 0.5 milliLiter(s) IntraMuscular once  lidocaine   4% Patch 1 Patch Transdermal daily  lidocaine   4% Patch 1 Patch Transdermal daily  magnesium oxide 400 milliGRAM(s) Oral three times a day with meals  melatonin 9 milliGRAM(s) Oral at bedtime  metoprolol tartrate 12.5 milliGRAM(s) Oral two times a day  multivitamin 1 Tablet(s) Oral daily  mupirocin 2% Ointment 1 Application(s) Topical two times a day  naloxegol 25 milliGRAM(s) Oral daily  pantoprazole    Tablet 40 milliGRAM(s) Oral before breakfast  petrolatum white Ointment 1 Application(s) Topical every 8 hours  polyethylene glycol 3350 17 Gram(s) Oral two times a day  povidone iodine 10% Solution 1 Application(s) Topical daily  QUEtiapine 25 milliGRAM(s) Oral at bedtime  sevelamer carbonate 800 milliGRAM(s) Oral three times a day with meals  silver nitrate Applicator 6 Application(s) Topical once  zinc sulfate 220 milliGRAM(s) Oral daily    MEDICATIONS  (PRN):  albuterol/ipratropium for Nebulization 3 milliLiter(s) Nebulizer every 6 hours PRN Shortness of Breath and/or Wheezing  dextrose Oral Gel 15 Gram(s) Oral once PRN Blood Glucose LESS THAN 70 milliGRAM(s)/deciliter  oxyCODONE    IR 20 milliGRAM(s) Oral every 6 hours PRN Severe Pain (7 - 10)  oxyCODONE    IR 15 milliGRAM(s) Oral two times a day PRN Moderate Pain (4 - 6)  oxyCODONE    IR 10 milliGRAM(s) Oral two times a day PRN Mild Pain (1 - 3)     Subjective  Patient seen and examined at bedside this afternoon.   Admits to sleeping well. Significantly improved ROM, and overall function  Now ambulating increasing distance  Observed wearing sneakers and sitting on his wc at bedside  No pain over sacral wound.    Plan for DC home on 6/13.     ROS--no acute pain, no fever or chest pain   B/l leg and sacral ulcers,  LBM 6/10    Function   -Ambulated 45' x 2 trials with bariatric RW incorporating turns house hold  distances with Close Supervision stand by assist. Steppage gait pattern used  today while wearing sneakers due to weak ankle DF/foot drop.    Therapeutic Activity  -Completed lower body dressing donning pants and sneakers prior to exiting bed  -Rolling to sitting edge of bed needing supervision  -Sit to stand with RW close supervision, SPT with RW close supervision      Vital Signs Last 24 Hrs  T(C): 36.8 (11 Jun 2025 08:08), Max: 37.2 (10 Nayan 2025 20:22)  T(F): 98.2 (11 Jun 2025 08:08), Max: 98.9 (10 Nayan 2025 20:22)  HR: 112 (11 Jun 2025 09:53) (95 - 112)  BP: 117/70 (11 Jun 2025 09:53) (91/57 - 123/75)  RR: 16 (11 Jun 2025 09:53) (16 - 16)  SpO2: 96% (11 Jun 2025 09:53) (93% - 98%)      EXAM  Gen - Comfortable, cheerful, motivated   HEENT - NAD  Neck - Supple, No limited ROM  Pulm - Clear  Cardiovascular - RRR, S1S2, No murmurs  Abdomen - Soft, non tender, +BS  Extremities - chronic skin changes with hyperpigmentation at shin, callus on feet, responding to treatment   Ulcer on both feet examined, healthy scab noted     Neuro-  AAO X 3, Speech is normal     Motor - UE 5/5 Hip flexion 3/5, Knee 4/5 PF 4/5, DF 2/5     Sensory - Decreased to light touch bilateral lower extremities      Coordination - impaired lower extremities   Psychiatric - Mood stable, Affect WNL    Skin:  R foot plantar aspect lateral/distal side 7cm x 5.5cm ischemic wound from pressors  RLE 4th digit 1x1.5cm ischemic induced wound from pressors  RLE 5th digit 3x2cm ischemic induced wound from pressors  L foot plantar aspect lateral/distal side 6x7cm ischemic induced wound from pressor usage  LLE 4th digit 2x1cm ischemic induced wound from pressors  LLE 5th digit 2.5x3cm ischemic induced wound from pressors   Unstageable pressure wound cocyx 7cmx4.5cm w/ 2.5cm depth  R buttock pressure induced wound semi contiguous with coccyx wound calling unstageable given prior debridement 8.5x5.5cm  L buttock 1.0x0.5cm stage 3 pressure injury  R buttock skin tear 3.5cm x 0.5cm and 1.5cmx0.5cm  Dry skin with callus on sole of foot     MMS--antigravity upper extremities,    improved strength        LABS:                        9.5    5.86  )-----------( 400      ( 09 Jun 2025 06:30 )             32.6     06-09    144  |  105  |  13  ----------------------------<  96  4.0   |  31  |  1.18    Ca    9.3      09 Jun 2025 06:30    TPro  7.3  /  Alb  2.2[L]  /  TBili  0.3  /  DBili  x   /  AST  22  /  ALT  19  /  AlkPhos  58  06-09      Urinalysis Basic - ( 09 Jun 2025 06:30 )    Color: x / Appearance: x / SG: x / pH: x  Gluc: 96 mg/dL / Ketone: x  / Bili: x / Urobili: x   Blood: x / Protein: x / Nitrite: x   Leuk Esterase: x / RBC: x / WBC x   Sq Epi: x / Non Sq Epi: x / Bacteria: x        CAPILLARY BLOOD GLUCOSE        MEDICATIONS  (STANDING):  ammonium lactate 12% Lotion 1 Application(s) Topical two times a day  apixaban 5 milliGRAM(s) Oral two times a day  ascorbic acid 500 milliGRAM(s) Oral daily  bisacodyl 10 milliGRAM(s) Oral at bedtime  capsaicin 0.025% Cream 1 Application(s) Topical four times a day  cefTRIAXone   IVPB 1000 milliGRAM(s) IV Intermittent every 24 hours  clotrimazole 1% Cream 1 Application(s) Topical <User Schedule>  collagenase Ointment 1 Application(s) Topical two times a day  Dakins Solution - 1/4 Strength 1 Application(s) Topical two times a day  dextrose 5%. 1000 milliLiter(s) (50 mL/Hr) IV Continuous <Continuous>  dextrose 5%. 1000 milliLiter(s) (100 mL/Hr) IV Continuous <Continuous>  dextrose 50% Injectable 25 Gram(s) IV Push once  dextrose 50% Injectable 12.5 Gram(s) IV Push once  dextrose 50% Injectable 25 Gram(s) IV Push once  gabapentin 1200 milliGRAM(s) Oral every 8 hours  glucagon  Injectable 1 milliGRAM(s) IntraMuscular once  influenza   Vaccine 0.5 milliLiter(s) IntraMuscular once  lidocaine   4% Patch 1 Patch Transdermal daily  lidocaine   4% Patch 1 Patch Transdermal daily  magnesium oxide 400 milliGRAM(s) Oral three times a day with meals  melatonin 9 milliGRAM(s) Oral at bedtime  metoprolol tartrate 12.5 milliGRAM(s) Oral two times a day  multivitamin 1 Tablet(s) Oral daily  mupirocin 2% Ointment 1 Application(s) Topical two times a day  naloxegol 25 milliGRAM(s) Oral daily  pantoprazole    Tablet 40 milliGRAM(s) Oral before breakfast  petrolatum white Ointment 1 Application(s) Topical every 8 hours  polyethylene glycol 3350 17 Gram(s) Oral two times a day  povidone iodine 10% Solution 1 Application(s) Topical daily  QUEtiapine 25 milliGRAM(s) Oral at bedtime  sevelamer carbonate 800 milliGRAM(s) Oral three times a day with meals  silver nitrate Applicator 6 Application(s) Topical once  zinc sulfate 220 milliGRAM(s) Oral daily    MEDICATIONS  (PRN):  albuterol/ipratropium for Nebulization 3 milliLiter(s) Nebulizer every 6 hours PRN Shortness of Breath and/or Wheezing  dextrose Oral Gel 15 Gram(s) Oral once PRN Blood Glucose LESS THAN 70 milliGRAM(s)/deciliter  oxyCODONE    IR 20 milliGRAM(s) Oral every 6 hours PRN Severe Pain (7 - 10)  oxyCODONE    IR 15 milliGRAM(s) Oral two times a day PRN Moderate Pain (4 - 6)  oxyCODONE    IR 10 milliGRAM(s) Oral two times a day PRN Mild Pain (1 - 3)

## 2025-06-11 NOTE — PROGRESS NOTE ADULT - ASSESSMENT
39 year old male with PMH of morbid obesity, BEA on CPAP, pAFIB (not on AC prior to admission), HTN, and BL papilledema attributed to pseudotumor cerebri; who initially presented to  on 2/24 with SOB and BL LE edema. Found to have URI with progressive SOB and multifocal PNA on imaging. His hospital course was complicated by worsening respiratory distress and increased 02 demands secondary to secretions (requiring multiple intubation attempts) and eventual MICU admission. While in MICU, he 02 requirements continued despite optimal vent management. Concern noted for progressive ARDS, unable to prone given morbid obesity, and ultimately cannulated for vvECMO on 2/25 and transferred to [facility] for further management on 2/26.  His hospital course at [facility] was significant for the following: diuretic and ABX management, s/p trache and PEG (placed on 3/7- PEG since removed), RCU admission, high grade fevers (secondary to panniculitis), progressively worsening sacral ulcer (likely osteomyelitis- s/p surgical debridement), BL foot wound (secondary to pressor use), neuropathy (secondary to critical illness polyneuropathy), ELLA (secondary to vancomycin toxicity and overdiuresis- since DCd- with improvement). Patient now admitted for a multidisciplinary rehab program. 04-25-25 @ 15:19    * Continued and remarkable functional improvement, reports pain b/l feet and calves attributes it to effect of increased ambulatory distance - continue Gabapentin  * Persisting/significant DF weakness - await orthotic eval for BL AFOs    * SW following for DC planning and arranging wound care teaching with family   * CXR with noted RUL infiltrate - start Ceftriaxone IV - transition to oral ABX on DC      #Acute on Chronic Hypercapnic Respiratory Failure due to Morbid Obesity/OHS in setting of RTD  #Critical Illness Myopathy  #Morbid Obesity  #OHS/BEA on Bipap  - Gait Instability, ADL impairments and Functional impairments: start Comprehensive Rehab Program of PT/OT  - 3 hours a day, 5 days a week   - PRN: Nebulizer QID   - Acute on Chronic Hypercapnic Respiratory Failure due to patient morbid obesity (BMI of 56.6) which is causing restriction of the thoracic cage not allowing for proper gas exchange.  The patient is having restrictive disease secondary to OHS as indicated by PFTs. Patient is currently hospitalized with diagnosis of acute on chronic hypercapnic respiratory failure secondary to thoracic restrictive disease related/consequent to OHS and/or morbid obesity. Patient demonstrates shortness of breath and accessory muscle use at rest. Despite the nightly use of BIPAP16/5 the patient remained hypercapnic with a PCO2 of 67.  The patient requires volume augmented ventilation with targeted tidal volume not found on a standard BiPAP for home use. The patient would benefit from NIV, otherwise would be at risk for further deterioration, readmissions and possible patient harm. Patient will need NIV for home.   - CXR with noted RUL infiltrate - start Ceftriaxone IV - transition to oral ABX on DC    # Persisting/significant DF weakness AFOs  - Orthotic eval for BL solid AFOs   - This patient has a diagnosis of BL foot drop. Patient is ambulatory. Patient requires a custom molded hinged AFO to preserve ankle motion while stabilizing talar and subtalar joints. Device will give a more natural gait without destabilizing the knee and hip. Device requires a low density lining to protect prominent roderick areas of effected skin. Patient will need the device for a term of greater than 9 months. No reasonable prefabricated orthosis exists.       #Sacral Osteomyelitis  - Zosyn TID - completed on 5/12/25    #Paroxysmal AFIB  - Eliquis 5mg BID   --low dose metoprolol 12.5mg bid (dose reduced 6/10 after RRT for hypotension)     #Calf Tenderness/pain b/l feet  - BL LE doppler negative 5/21   --Apply offloading boots when OOB      #L hip pain - resolved  - no acute findings on XR or CT   - L hip MRI (5/7) no significant findings     #HTN  - Amlodipine 10mg dialy     #Sleep/Mood  - Melatonin 9mg at HS   - Quetiapine 25mg at HS     #Skin  Wound Recs per Plastic Surgery (5/16):  Sacral Pressure Injury: BID- Cleanse with NS, remove excess fluid, apply santyl ointment to sloughing tissues, pack wound with 1-inch dry plain packing, cover with 4 inch gauze and abd combine  - R buttock: BID- Cleanse with NS, overlay calcium alginate, ABD combine dressing  Additional wound care instructions:  -->Right anterior thigh to groin isolated wound: Clean wound and periwound skin with NS. Pat dry. Apply liquid barrier film to periwound skin, allow to dry. Cover with silicone foam with border. Change daily or PRN if soiled   --> Bilateral abdominal pannus, bilateral groin: Cleanse with luke-warm soap and water, dry well. Apply clotrimazole  --> Bilateral feet: Apply betadine  to bilateral foot wounds followed by 4x4 gauze, ABD pad, and matilde daily per podiatry.  --> Continue to offload pressure; bariatric low airloss support surface, turn and position per protocol with use of fluidized positioning devices, continue incontinence and moisture care per protocol and use of single breathable incontinence pads, continue to offload heels with fluidized positioning devices. Continue use of bariatric seat cushion, limit sit time- no more than 2 consecutive hours at any given time.  - Pressure injury/Skin: OOB to Chair, PT/OT    - Ammonium lactate to BL LEs     #Neuropathy/sacral ulcer  - Gabapentin 1200mg TID --aim to taper to lower dose    #Pain Mgmt   - Capsaicin cream to BL feet QID - Avoid use on damaged, broken, or irritated skin. Avoid occlusive dressing or heat   - Lidocaine patch to BL thighs   - PRN:; Oxycodone and Tylenol 650mg QID   -celebrex 100mg bid for sacral pain   - neuropathic pain b/l feet c/w   gabapentin    #Morbid obesity  - Most recent weight 394lbs (5/7)     #GI/Bowel Mgmt   - Pantoprazole  - Bisacodyl 10mg at HS   - Miralax   - Naloxegal 25mg daily     #/Bladder Mgmt   #ELLA  - USKB 5/20 negative   - Renvela 800mg TID    #FEN --Morbid obesity (BMI > 40).  - Diet - Regular + Thins  [CCHO]    - MVI     # Health Management:   Environmental challenge affecting dc plan--narrow door and need for ramp to house entrance  However, patient making goal directed effort towards therapy goals     #Precautions / PROPHYLAXIS:   - Falls  - ortho: Weight bearing status: WBAT in surgical shoe   - Lungs: Aspiration, Incentive Spirometer   - DVT: apixiban  ----------------------------------------------      Function as at 6/10  Ambulating 75 ft  with RW CS  Barrier--ulcers on feet, limiting wt bearing,  environmental barrier at home (stairs), severe obesity  Est dc 6/13,       ----------------------------------------------  Dr. CANTU's Liaison with Family/Providers:  6/9- Met Patient's father during his visit to the unit during, he expressed happiness with patient's functional progress  We discussed dc planning including plans to address environmental barriers at home  Reports that there is a back entrance through which he could enter the house with limited number of stairs    6/10--Girlfriend present during review, she contributed during review, including discussion on DC planning, plan for the structural barrier at home (few stairs to entrance)  Patient had agreed to get a ramp, working with his aunt, or alternative plan fo railing for the stairs  Agreed to dc plan for 6/13 to home     ----------------------------------------------  OUTPATIENT/FOLLOW UP:    Your Primary Care Provider,   Phone: (   )    -  Fax: (   )    -  Follow Up Time: 1 week    [facility],   [address] [place] [address]  Phone: [phone]  Fax: (   )    -  Follow Up Time:    Dr. Waterhouse  Podiatry  [phone]  Within 1 week       39 year old male with PMH of morbid obesity, BEA on CPAP, pAFIB (not on AC prior to admission), HTN, and BL papilledema attributed to pseudotumor cerebri; who initially presented to  on 2/24 with SOB and BL LE edema. Found to have URI with progressive SOB and multifocal PNA on imaging. His hospital course was complicated by worsening respiratory distress and increased 02 demands secondary to secretions (requiring multiple intubation attempts) and eventual MICU admission. While in MICU, he 02 requirements continued despite optimal vent management. Concern noted for progressive ARDS, unable to prone given morbid obesity, and ultimately cannulated for vvECMO on 2/25 and transferred to [facility] for further management on 2/26.  His hospital course at [facility] was significant for the following: diuretic and ABX management, s/p trache and PEG (placed on 3/7- PEG since removed), RCU admission, high grade fevers (secondary to panniculitis), progressively worsening sacral ulcer (likely osteomyelitis- s/p surgical debridement), BL foot wound (secondary to pressor use), neuropathy (secondary to critical illness polyneuropathy), ELLA (secondary to vancomycin toxicity and overdiuresis- since DCd- with improvement). Patient now admitted for a multidisciplinary rehab program. 04-25-25 @ 15:19    * Continued and remarkable functional improvement, reports pain b/l feet and calves attributes it to effect of increased ambulatory distance - continue Gabapentin  * Persisting/significant DF weakness - await orthotic eval for BL AFOs    * SW following for DC planning and arranging wound care teaching with family   * CXR with noted RUL infiltrate - start Ceftriaxone IV - transition to oral ABX on DC    #Acute on Chronic Hypercapnic Respiratory Failure due to Morbid Obesity/OHS in setting of RTD  #Critical Illness Myopathy  #Morbid Obesity  #OHS/BEA on Bipap  - Gait Instability, ADL impairments and Functional impairments: start Comprehensive Rehab Program of PT/OT  - 3 hours a day, 5 days a week   - PRN: Nebulizer QID   - Acute on Chronic Hypercapnic Respiratory Failure due to patient morbid obesity (BMI of 56.6) which is causing restriction of the thoracic cage not allowing for proper gas exchange.  The patient is having restrictive disease secondary to OHS as indicated by PFTs. Patient is currently hospitalized with diagnosis of acute on chronic hypercapnic respiratory failure secondary to thoracic restrictive disease related/consequent to OHS and/or morbid obesity. Patient demonstrates shortness of breath and accessory muscle use at rest. Despite the nightly use of BIPAP16/5 the patient remained hypercapnic with a PCO2 of 67.  The patient requires volume augmented ventilation with targeted tidal volume not found on a standard BiPAP for home use. The patient would benefit from NIV, otherwise would be at risk for further deterioration, readmissions and possible patient harm. Patient will need NIV for home.   - CXR with noted RUL infiltrate - start Ceftriaxone IV - transition to oral ABX on DC    # Persisting/significant DF weakness AFOs  - Orthotic eval for BL solid AFOs   - This patient has a diagnosis of BL foot drop. Patient is ambulatory. Patient requires a custom molded hinged AFO to preserve ankle motion while stabilizing talar and subtalar joints. Device will give a more natural gait without destabilizing the knee and hip. Device requires a low density lining to protect prominent roderick areas of effected skin. Patient will need the device for a term of greater than 9 months. No reasonable prefabricated orthosis exists.       #Sacral Osteomyelitis  - Zosyn TID - completed on 5/12/25    #Paroxysmal AFIB  - Eliquis 5mg BID   --low dose metoprolol 12.5mg bid (dose reduced 6/10 after RRT for hypotension)     #Calf Tenderness/pain b/l feet  - BL LE doppler negative 5/21   --Apply offloading boots when OOB      #L hip pain - resolved  - no acute findings on XR or CT   - L hip MRI (5/7) no significant findings     #HTN  - Amlodipine 10mg dialy     #Sleep/Mood  - Melatonin 9mg at HS   - Quetiapine 25mg at HS     #Skin  Wound Recs per Plastic Surgery (5/16):  Sacral Pressure Injury: BID- Cleanse with NS, remove excess fluid, apply santyl ointment to sloughing tissues, pack wound with 1-inch dry plain packing, cover with 4 inch gauze and abd combine  - R buttock: BID- Cleanse with NS, overlay calcium alginate, ABD combine dressing  Additional wound care instructions:  -->Right anterior thigh to groin isolated wound: Clean wound and periwound skin with NS. Pat dry. Apply liquid barrier film to periwound skin, allow to dry. Cover with silicone foam with border. Change daily or PRN if soiled   --> Bilateral abdominal pannus, bilateral groin: Cleanse with luke-warm soap and water, dry well. Apply clotrimazole  --> Bilateral feet: Apply betadine  to bilateral foot wounds followed by 4x4 gauze, ABD pad, and matilde daily per podiatry.  --> Continue to offload pressure; bariatric low airloss support surface, turn and position per protocol with use of fluidized positioning devices, continue incontinence and moisture care per protocol and use of single breathable incontinence pads, continue to offload heels with fluidized positioning devices. Continue use of bariatric seat cushion, limit sit time- no more than 2 consecutive hours at any given time.  - Pressure injury/Skin: OOB to Chair, PT/OT    - Ammonium lactate to BL LEs     #Neuropathy/sacral ulcer  - Gabapentin 1200mg TID --aim to taper to lower dose    #Pain Mgmt   - Capsaicin cream to BL feet QID - Avoid use on damaged, broken, or irritated skin. Avoid occlusive dressing or heat   - Lidocaine patch to BL thighs   - PRN:; Oxycodone and Tylenol 650mg QID   -celebrex 100mg bid for sacral pain   - neuropathic pain b/l feet c/w   gabapentin    #Morbid obesity  - Most recent weight 394lbs (5/7)     #GI/Bowel Mgmt   - Pantoprazole  - Bisacodyl 10mg at HS   - Miralax   - Naloxegal 25mg daily     #/Bladder Mgmt   #ELLA  - USKB 5/20 negative   - Renvela 800mg TID    #FEN --Morbid obesity (BMI > 40).  - Diet - Regular + Thins  [CCHO]    - MVI     # Health Management:   Environmental challenge affecting dc plan--narrow door and need for ramp to house entrance  However, patient making goal directed effort towards therapy goals     #Precautions / PROPHYLAXIS:   - Falls  - ortho: Weight bearing status: WBAT in surgical shoe   - Lungs: Aspiration, Incentive Spirometer   - DVT: apixiban  ----------------------------------------------      Function as at 6/10  Ambulating 75 ft  with RW CS  Barrier--ulcers on feet, limiting wt bearing,  environmental barrier at home (stairs), severe obesity  Est dc 6/13,       ----------------------------------------------  Dr. CANTU's Liaison with Family/Providers:  6/9- Met Patient's father during his visit to the unit during, he expressed happiness with patient's functional progress  We discussed dc planning including plans to address environmental barriers at home  Reports that there is a back entrance through which he could enter the house with limited number of stairs    6/10--Girlfriend present during review, she contributed during review, including discussion on DC planning, plan for the structural barrier at home (few stairs to entrance)  Patient had agreed to get a ramp, working with his aunt, or alternative plan fo railing for the stairs  Agreed to dc plan for 6/13 to home     ----------------------------------------------  OUTPATIENT/FOLLOW UP:    Your Primary Care Provider,   Phone: (   )    -  Fax: (   )    -  Follow Up Time: 1 week    [facility],   [address] [place] [address]  Phone: [phone]  Fax: (   )    -  Follow Up Time:    Dr. Waterhouse  Podiatry  [phone]  Within 1 week

## 2025-06-11 NOTE — PROGRESS NOTE ADULT - SUBJECTIVE AND OBJECTIVE BOX
sleeping. not using cpap at night; feels he is doing ok.  overnight had some ruq pain when he turned in bed, none now.   no sob, no cp.   gf updated    addendum: chest xr with RUL pna.     rocephin CC: Patient is a 39y old  Male who presents with a chief complaint of Debility secondary to acute hypoxic respiratory failure (11 Jun 2025 14:40)      Interval History:  Patient seen and examined at bedside in morning w. GF at bedside. reseen in afternoon d/t abnormal chest xr results.  No overnight events  No complaints this morning.   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   he denies recurrent episodes of feeling hot. no chills. no sob/cough/cp/palpitations.  had some abdominal discomfort, nonradiating. one spot in ruq when he turned to his right side in bed. occurred twice. once post BM. no pain today. thinks muscle pull.       ALLERGIES:  No Known Allergies    MEDICATIONS  (STANDING):  ammonium lactate 12% Lotion 1 Application(s) Topical two times a day  apixaban 5 milliGRAM(s) Oral two times a day  ascorbic acid 500 milliGRAM(s) Oral daily  bisacodyl 10 milliGRAM(s) Oral at bedtime  capsaicin 0.025% Cream 1 Application(s) Topical four times a day  cefTRIAXone   IVPB 1000 milliGRAM(s) IV Intermittent every 24 hours  clotrimazole 1% Cream 1 Application(s) Topical <User Schedule>  collagenase Ointment 1 Application(s) Topical two times a day  Dakins Solution - 1/4 Strength 1 Application(s) Topical two times a day  dextrose 5%. 1000 milliLiter(s) (50 mL/Hr) IV Continuous <Continuous>  dextrose 5%. 1000 milliLiter(s) (100 mL/Hr) IV Continuous <Continuous>  dextrose 50% Injectable 25 Gram(s) IV Push once  dextrose 50% Injectable 12.5 Gram(s) IV Push once  dextrose 50% Injectable 25 Gram(s) IV Push once  gabapentin 1200 milliGRAM(s) Oral every 8 hours  glucagon  Injectable 1 milliGRAM(s) IntraMuscular once  influenza   Vaccine 0.5 milliLiter(s) IntraMuscular once  lactated ringers. 500 milliLiter(s) (100 mL/Hr) IV Continuous <Continuous>  lidocaine   4% Patch 1 Patch Transdermal daily  lidocaine   4% Patch 1 Patch Transdermal daily  magnesium oxide 400 milliGRAM(s) Oral three times a day with meals  melatonin 9 milliGRAM(s) Oral at bedtime  metoprolol tartrate 12.5 milliGRAM(s) Oral two times a day  multivitamin 1 Tablet(s) Oral daily  mupirocin 2% Ointment 1 Application(s) Topical two times a day  naloxegol 25 milliGRAM(s) Oral daily  pantoprazole    Tablet 40 milliGRAM(s) Oral before breakfast  petrolatum white Ointment 1 Application(s) Topical every 8 hours  polyethylene glycol 3350 17 Gram(s) Oral two times a day  povidone iodine 10% Solution 1 Application(s) Topical daily  QUEtiapine 25 milliGRAM(s) Oral at bedtime  sevelamer carbonate 800 milliGRAM(s) Oral three times a day with meals  silver nitrate Applicator 6 Application(s) Topical once  zinc sulfate 220 milliGRAM(s) Oral daily    MEDICATIONS  (PRN):  albuterol/ipratropium for Nebulization 3 milliLiter(s) Nebulizer every 6 hours PRN Shortness of Breath and/or Wheezing  dextrose Oral Gel 15 Gram(s) Oral once PRN Blood Glucose LESS THAN 70 milliGRAM(s)/deciliter  oxyCODONE    IR 20 milliGRAM(s) Oral every 6 hours PRN Severe Pain (7 - 10)  oxyCODONE    IR 15 milliGRAM(s) Oral two times a day PRN Moderate Pain (4 - 6)  oxyCODONE    IR 10 milliGRAM(s) Oral two times a day PRN Mild Pain (1 - 3)    Vital Signs Last 24 Hrs  T(F): 98.2 (11 Jun 2025 08:08), Max: 98.9 (10 Nayan 2025 20:22)  HR: 127 (11 Jun 2025 14:41) (95 - 127)  BP: 104/64 (11 Jun 2025 14:41) (91/57 - 123/75)  RR: 16 (11 Jun 2025 14:41) (16 - 16)  SpO2: 94% (11 Jun 2025 14:41) (93% - 98%)  I&O's Summary    10 Nayan 2025 07:01  -  11 Jun 2025 07:00  --------------------------------------------------------  IN: 0 mL / OUT: 400 mL / NET: -400 mL          PHYSICAL EXAM:  GENERAL: NAD. sleeping but arouses easily.   HEENT: NCAT  CHEST/LUNG: Clear to percussion bilaterally; No rales, rhonchi, wheezing. not using cpap. on room air.  HEART: Regular rate and rhythm  ABDOMEN: Soft, Nontender, Nondistended; Bowel sounds present  MUSCULOSKELETAL/EXTREMITIES: 2+ pulses, no LE edema   PSYCH: Appropriate affect  NEURO: Alert & Oriented x 3    LABS:                        9.5    5.86  )-----------( 400      ( 09 Jun 2025 06:30 )             32.6       06-09    144  |  105  |  13  ----------------------------<  96  4.0   |  31  |  1.18    Ca    9.3      09 Jun 2025 06:30    TPro  7.3  /  Alb  2.2  /  TBili  0.3  /  DBili  x   /  AST  22  /  ALT  19  /  AlkPhos  58  06-09       Urinalysis Basic - ( 09 Jun 2025 06:30 )    Color: x / Appearance: x / SG: x / pH: x  Gluc: 96 mg/dL / Ketone: x  / Bili: x / Urobili: x   Blood: x / Protein: x / Nitrite: x   Leuk Esterase: x / RBC: x / WBC x   Sq Epi: x / Non Sq Epi: x / Bacteria: x            Care Discussed with Consultants/Other Providers: Yes

## 2025-06-11 NOTE — PROGRESS NOTE ADULT - ASSESSMENT
39 year old male  with PMH of morbid obesity, BEA, pAFIB (not on AC), HTN, and BL papilledema attributed to pseudotumor cerebri; who initially presented to  on 2/24 with SOB and BL LE edema. Found to have URI with progressive SOB and multifocal PNA on imaging. His hospital course was complicated by worsening respiratory distress and increased O2 demands secondary to secretions (requiring multiple intubation attempts) and eventual MICU admission. While in MICU, his O2 requirements continued despite optimal vent management. Concern noted for progressive ARDS, unable to prone given morbid obesity, and ultimately cannulated for vvECMO on 2/25 and transferred to [facility] for further management on 2/26. His hospital course at [facility] was significant for the following: diuretic and ABX management, s/p trach and PEG (placed on 3/7- PEG since removed), RCU admission, high grade fevers (secondary to panniculitis), progressively worsening sacral ulcer (likely osteomyelitis- s/p surgical debridement), BL foot wound (secondary to pressor use), neuropathy (secondary to critical illness polyneuropathy), ELLA (secondary to vancomycin toxicity and overdiuresis- since DCd- with improvement). Patient now admitted for a multidisciplinary rehab program.     # Rapid response 6/10/25  +OH with diaphoresis, tachycardia and SBP in 60's upon return to room  - BP now improved w. leg eleavtion.   - obtain IV site, fluid bolus 500ml LR.  - d/c flomax  - reduced BB with holding parameters  - EKG personally reviewed and appears sinus tach 100. NO ischemic changes  was hypoxic initially but denies sxs. obtain chest xr.  labs considered; no signs of bleeding. suspect low volume, pt skipped breakfast. defer labs to primary team.      #Left hip pain - Improving  -Hip xray--> no fracture  -CT hip: Again seen is a sacral decubitus wound which extends superficial as well as involving the right gluteus musculature and the posterior L5 with small amount of fluid and gas within the tract. Limited involvement on this noncontrast CT. This likely corresponds to adjacent phlegmonous changes  -MRI left hip: Incompletely characterized sacral wound with associated ill-defined collection of fluid and soft tissue gas which extends to/involve the right gluteus zurdo muscle as at CT pelvis study from one day prior. No interval change in the high STIR signal within the bilateral gluteal and thigh musculature suggestive of a myositis as compared to prior MRI study. No fracture, osseous destruction or aggressive periosteal reaction. No osteomyelitis at the left hip. Edema-like signal along the right and left greater trochanteric regions, overall increased as compared to the prior MRI study from 4/11/2025. Findings are felt to be reactive in etiology.  -Continue comprehensive rehab program - PT/OT/SLP per rehab team  -Pain management, bowel regimen per rehab     #Abnormal CT A/P  -5/2: CT A/P: Question of pneumatosis in the cecum with no other evidence of bowel ischemia. Correlation with lactate levels and clinical exam would be helpful.  -Patient has no symptoms. Abdomen is benign. Tolerating PO  -5/2: Surgery cx noted: no intervention. c/w PO. refer to bariatric surgery post-DC dr botello at [place] or Dr. Segovia at [place] [per patient request]  -5/3 GI cx noted: no GI intervention planned. c/w PO    #Stage IV Sacral Decub Ulcer  #Bilateral Buttocks Wounds  #Bilateral Foot Wounds  -5/2 CT A/P: An air and fluid containing collection in the right gluteus zurdo muscle in continuity with a subcutaneous sacral collection. No obvious skin defect to suggest a decubitus ulcer.  -5/7: MRI left hip: Incompletely characterized sacral wound with associated ill-defined collection of fluid and soft tissue gas which extends to/involve the right gluteus zurdo muscle as at CT pelvis study from one day prior. No interval change in the high STIR signal within the bilateral gluteal and thigh musculature suggestive of a myositis as compared to prior MRI study. No fracture, osseous destruction or aggressive periosteal reaction. No osteomyelitis at the left hip. Edema-like signal along the right and left greater trochanteric regions, overall increased as compared to the prior MRI study from 4/11/2025. Findings are felt to be reactive in etiology..  -Continue local wound care  -MVI, vitamin C and zinc   -Off load pressure  -Plastic consult following  -ID follow up noted and appreciated  -Per plastics - continue current management    #Fungemia 2/2 Panniculitis - resolved  -Blood culture 3/15, 3/16 with candida albicans, s/p course of antifungals   -Repeat cultures negative since 3/18    #Debility Secondary to Acute Hypoxic Respiratory Failure/ARDS 2/2 PNA  #BEA (Not on CPAP)  #Critical Illness Polyneuropathy   -s/p VV ECMO, s/p diuresis, TTE/ROBERT with RV failure, s/p treatment for pneumonia  -Repeat Echo 3/11 showed EF 66 %. RV is not well visualized, enlarged RV cavity size a/ reduced RV systolic function. No significant valvular disease.  No echocardiographic evidence of pulmonary hypertension  -s/p Trach (decannulated 3/28) and PEG (removed 4/15)   -Saturating well on RA. Continue duoneb prn  -BIPAP qhs (FiO2 40%, 18/10) via nasal mask   -Fall precaution  -Pain control per rehab team   6/10: recurrent hypoxia while orthostatic: use 2L nc to keep sao2>92%. obtain chest xr.     #p-AFIB  #Sinus Tachycardia (Resolved)  -Patient has intermittent tachycardia. Per patient, His HR  mostly above 110 even when he is not sick. follows with cardio OP  -5/3: EKG: NSR@94 bpm  -Eliquis   -Off norvasc  -Continue metoprolol, reduced dose to 25mg BID given soft BP (6/4)  -TSH WNL 2/25  -Sinus tachycardia likely multifactorial including pain and deconditioning. Low suspicion for PE, no SOB/hypoxia and he is already on full AC  - 6/10 ekg sinus. BB dose decreased d/t OH.    #RIJ and Bilateral LE DVT  -Duplex RUE 3/14 showed Acute, non-occlusive deep vein thrombosis noted within the right internal jugular vein. Superficial vein thrombosis noted within the right antecubital cephalic vein  -Duplex LE 3/14 showed Acute, occlusive deep vein thromboses noted within the right and left peroneal veins at both mid calves. Age-indeterminate, occlusive deep vein thrombosis visualized within the left gastrocnemius vein at the proximal calf.  -Continue eliquis  -Repeat duplex 4/29 showed No evidence of deep venous thrombosis in either lower extremity.    #Normocytic Anemia - Likely multifactorial  -H/H stable, no evidence of active bleed   -Monitor CBC    #HTN  -Amlodipine d/melly  -Continue Metoprolol, reduced dose to 25mg BID with holding parameters  -Monitor BP     #History of Pseudotumor Cerebri   -Outpatient follow up     #ELLA - resolved  -Baseline Cr appears to be ~0.8 range   -ELLA felt to be multifactorial in setting of cardiorenal w/ RVE, recurrent ELLA suspected due to vancomycin toxicity and diuresis   -Continue to encourage oral hydration   -Renvela   -Low phos diet  -Monitor BMP and phos level     #Type 2 Diabetes, HbA1C 4.9 (4/28)  -FS wnl. Monitor glucose on routine lab, controlled     #Urinary Retention  -Flomax 5/1  -Renal u/s 5/20 unremarkable    #Mood  -Continue Seroquel    #DVT ppx - Eliquis  #GI ppx - PPI    Discussed with rehab team at bedside.    39 year old male  with PMH of morbid obesity, BEA, pAFIB (not on AC), HTN, and BL papilledema attributed to pseudotumor cerebri; who initially presented to  on 2/24 with SOB and BL LE edema. Found to have URI with progressive SOB and multifocal PNA on imaging. His hospital course was complicated by worsening respiratory distress and increased O2 demands secondary to secretions (requiring multiple intubation attempts) and eventual MICU admission. While in MICU, his O2 requirements continued despite optimal vent management. Concern noted for progressive ARDS, unable to prone given morbid obesity, and ultimately cannulated for vvECMO on 2/25 and transferred to [facility] for further management on 2/26. His hospital course at [facility] was significant for the following: diuretic and ABX management, s/p trach and PEG (placed on 3/7- PEG since removed), RCU admission, high grade fevers (secondary to panniculitis), progressively worsening sacral ulcer (likely osteomyelitis- s/p surgical debridement), BL foot wound (secondary to pressor use), neuropathy (secondary to critical illness polyneuropathy), ELLA (secondary to vancomycin toxicity and overdiuresis- since DCd- with improvement). Patient now admitted for a multidisciplinary rehab program.     # Rapid response 6/10/25  +OH with diaphoresis, tachycardia and SBP in 60's upon return to room  - BP now improved w. leg eleavtion.   - obtain IV site, fluid bolus 500ml LR.  - d/c flomax  - reduced BB with holding parameters  - EKG personally reviewed and appears sinus tach 100. NO ischemic changes  was hypoxic initially but denies sxs. obtain chest xr.  labs considered; no signs of bleeding. suspect low volume, pt skipped breakfast. defer labs to primary team.  - 6/11: no recurrence of sxs.     #Left hip pain - Improving  -Hip xray--> no fracture  -CT hip: Again seen is a sacral decubitus wound which extends superficial as well as involving the right gluteus musculature and the posterior L5 with small amount of fluid and gas within the tract. Limited involvement on this noncontrast CT. This likely corresponds to adjacent phlegmonous changes  -MRI left hip: Incompletely characterized sacral wound with associated ill-defined collection of fluid and soft tissue gas which extends to/involve the right gluteus zurdo muscle as at CT pelvis study from one day prior. No interval change in the high STIR signal within the bilateral gluteal and thigh musculature suggestive of a myositis as compared to prior MRI study. No fracture, osseous destruction or aggressive periosteal reaction. No osteomyelitis at the left hip. Edema-like signal along the right and left greater trochanteric regions, overall increased as compared to the prior MRI study from 4/11/2025. Findings are felt to be reactive in etiology.  -Continue comprehensive rehab program - PT/OT/SLP per rehab team  -Pain management, bowel regimen per rehab     #Abnormal CT A/P  -5/2: CT A/P: Question of pneumatosis in the cecum with no other evidence of bowel ischemia. Correlation with lactate levels and clinical exam would be helpful.  -Patient has no symptoms. Abdomen is benign. Tolerating PO  -5/2: Surgery cx noted: no intervention. c/w PO. refer to bariatric surgery post-DC dr botello at [place] or Dr. Segovia at [place] [per patient request]  -5/3 GI cx noted: no GI intervention planned. c/w PO    #Stage IV Sacral Decub Ulcer  #Bilateral Buttocks Wounds  #Bilateral Foot Wounds  -5/2 CT A/P: An air and fluid containing collection in the right gluteus zurdo muscle in continuity with a subcutaneous sacral collection. No obvious skin defect to suggest a decubitus ulcer.  -5/7: MRI left hip: Incompletely characterized sacral wound with associated ill-defined collection of fluid and soft tissue gas which extends to/involve the right gluteus zurdo muscle as at CT pelvis study from one day prior. No interval change in the high STIR signal within the bilateral gluteal and thigh musculature suggestive of a myositis as compared to prior MRI study. No fracture, osseous destruction or aggressive periosteal reaction. No osteomyelitis at the left hip. Edema-like signal along the right and left greater trochanteric regions, overall increased as compared to the prior MRI study from 4/11/2025. Findings are felt to be reactive in etiology..  -Continue local wound care  -MVI, vitamin C and zinc   -Off load pressure  -Plastic consult following  -ID follow up noted and appreciated  -Per plastics - continue current management    #Fungemia 2/2 Panniculitis - resolved  -Blood culture 3/15, 3/16 with candida albicans, s/p course of antifungals   -Repeat cultures negative since 3/18    #Debility Secondary to Acute Hypoxic Respiratory Failure/ARDS 2/2 PNA  #BEA (Not on CPAP)  #Critical Illness Polyneuropathy   -s/p VV ECMO, s/p diuresis, TTE/ROBERT with RV failure, s/p treatment for pneumonia  -Repeat Echo 3/11 showed EF 66 %. RV is not well visualized, enlarged RV cavity size a/ reduced RV systolic function. No significant valvular disease.  No echocardiographic evidence of pulmonary hypertension  -s/p Trach (decannulated 3/28) and PEG (removed 4/15)   -Saturating well on RA. Continue duoneb prn  -BIPAP qhs (FiO2 40%, 18/10) via nasal mask   -Fall precaution  -Pain control per rehab team   6/10: recurrent hypoxia while orthostatic: use 2L nc to keep sao2>92%. obtain chest xr.   6/11: new RUL PNA on Chest xr. personally reviewed XR and compared to prior. sight haziness in RUL which is new. no sxs except elevated HR. hypoxia was transient yest. clinically looks better today. will check labs in am. start rocephin w. complex hx. encouraged use of cpap at night. obtain sputum culture if able.    #p-AFIB  #Sinus Tachycardia (Resolved)  -Patient has intermittent tachycardia. Per patient, His HR  mostly above 110 even when he is not sick. follows with cardio OP  -5/3: EKG: NSR@94 bpm  -Eliquis   -Off norvasc  -Continue metoprolol, reduced dose to 25mg BID given soft BP (6/4)  -TSH WNL 2/25  -Sinus tachycardia likely multifactorial including pain and deconditioning. Low suspicion for PE, no SOB/hypoxia and he is already on full AC  - 6/10 ekg sinus. BB dose decreased d/t OH.  6/11: sinus tach. asymptomatic. missed BB yest evening. on lower dose BB today. - 500cc IVF over 5 hrs.     #RIJ and Bilateral LE DVT  -Duplex RUE 3/14 showed Acute, non-occlusive deep vein thrombosis noted within the right internal jugular vein. Superficial vein thrombosis noted within the right antecubital cephalic vein  -Duplex LE 3/14 showed Acute, occlusive deep vein thromboses noted within the right and left peroneal veins at both mid calves. Age-indeterminate, occlusive deep vein thrombosis visualized within the left gastrocnemius vein at the proximal calf.  -Continue eliquis  -Repeat duplex 4/29 showed No evidence of deep venous thrombosis in either lower extremity.    #Normocytic Anemia - Likely multifactorial  -H/H stable, no evidence of active bleed   -Monitor CBC    #HTN  -Amlodipine d/melly  -Continue Metoprolol, reduced dose to 25mg BID with holding parameters  -Monitor BP     #History of Pseudotumor Cerebri   -Outpatient follow up     #ELLA - resolved  -Baseline Cr appears to be ~0.8 range   -ELLA felt to be multifactorial in setting of cardiorenal w/ RVE, recurrent ELLA suspected due to vancomycin toxicity and diuresis   -Continue to encourage oral hydration   -Renvela   -Low phos diet  -Monitor BMP and phos level     #Type 2 Diabetes, HbA1C 4.9 (4/28)  -FS wnl. Monitor glucose on routine lab, controlled     #Urinary Retention  -Flomax 5/1  -Renal u/s 5/20 unremarkable    #Mood  -Continue Seroquel    #DVT ppx - Eliquis  #GI ppx - PPI    Discussed with rehab team at bedside.    39 year old male  with PMH of morbid obesity, BEA, pAFIB (not on AC), HTN, and BL papilledema attributed to pseudotumor cerebri; who initially presented to  on 2/24 with SOB and BL LE edema. Found to have URI with progressive SOB and multifocal PNA on imaging. His hospital course was complicated by worsening respiratory distress and increased O2 demands secondary to secretions (requiring multiple intubation attempts) and eventual MICU admission. While in MICU, his O2 requirements continued despite optimal vent management. Concern noted for progressive ARDS, unable to prone given morbid obesity, and ultimately cannulated for vvECMO on 2/25 and transferred to [facility] for further management on 2/26. His hospital course at [facility] was significant for the following: diuretic and ABX management, s/p trach and PEG (placed on 3/7- PEG since removed), RCU admission, high grade fevers (secondary to panniculitis), progressively worsening sacral ulcer (likely osteomyelitis- s/p surgical debridement), BL foot wound (secondary to pressor use), neuropathy (secondary to critical illness polyneuropathy), ELLA (secondary to vancomycin toxicity and overdiuresis- since DCd- with improvement). Patient now admitted for a multidisciplinary rehab program.     # Rapid response 6/10/25  +OH with diaphoresis, tachycardia and SBP in 60's upon return to room  - BP now improved w. leg eleavtion.   - obtain IV site, fluid bolus 500ml LR.  - d/c flomax  - reduced BB with holding parameters  - EKG personally reviewed and appears sinus tach 100. NO ischemic changes  was hypoxic initially but denies sxs. obtain chest xr.  labs considered; no signs of bleeding. suspect low volume, pt skipped breakfast. defer labs to primary team.  - 6/11: no recurrence of sxs.     #Left hip pain - Improving  -Hip xray--> no fracture  -CT hip: Again seen is a sacral decubitus wound which extends superficial as well as involving the right gluteus musculature and the posterior L5 with small amount of fluid and gas within the tract. Limited involvement on this noncontrast CT. This likely corresponds to adjacent phlegmonous changes  -MRI left hip: Incompletely characterized sacral wound with associated ill-defined collection of fluid and soft tissue gas which extends to/involve the right gluteus zurdo muscle as at CT pelvis study from one day prior. No interval change in the high STIR signal within the bilateral gluteal and thigh musculature suggestive of a myositis as compared to prior MRI study. No fracture, osseous destruction or aggressive periosteal reaction. No osteomyelitis at the left hip. Edema-like signal along the right and left greater trochanteric regions, overall increased as compared to the prior MRI study from 4/11/2025. Findings are felt to be reactive in etiology.  -Continue comprehensive rehab program - PT/OT/SLP per rehab team  -Pain management, bowel regimen per rehab     #Abnormal CT A/P  -5/2: CT A/P: Question of pneumatosis in the cecum with no other evidence of bowel ischemia. Correlation with lactate levels and clinical exam would be helpful.  -Patient has no symptoms. Abdomen is benign. Tolerating PO  -5/2: Surgery cx noted: no intervention. c/w PO. refer to bariatric surgery post-DC dr botello at [place] or Dr. Segovia at [place] [per patient request]  -5/3 GI cx noted: no GI intervention planned. c/w PO    #Stage IV Sacral Decub Ulcer  #Bilateral Buttocks Wounds  #Bilateral Foot Wounds  -5/2 CT A/P: An air and fluid containing collection in the right gluteus zurdo muscle in continuity with a subcutaneous sacral collection. No obvious skin defect to suggest a decubitus ulcer.  -5/7: MRI left hip: Incompletely characterized sacral wound with associated ill-defined collection of fluid and soft tissue gas which extends to/involve the right gluteus zurdo muscle as at CT pelvis study from one day prior. No interval change in the high STIR signal within the bilateral gluteal and thigh musculature suggestive of a myositis as compared to prior MRI study. No fracture, osseous destruction or aggressive periosteal reaction. No osteomyelitis at the left hip. Edema-like signal along the right and left greater trochanteric regions, overall increased as compared to the prior MRI study from 4/11/2025. Findings are felt to be reactive in etiology..  -Continue local wound care  -MVI, vitamin C and zinc   -Off load pressure  -Plastic consult following  -ID follow up noted and appreciated  -Per plastics - continue current management    #Fungemia 2/2 Panniculitis - resolved  -Blood culture 3/15, 3/16 with candida albicans, s/p course of antifungals   -Repeat cultures negative since 3/18    #Debility Secondary to Acute Hypoxic Respiratory Failure/ARDS 2/2 PNA  #BEA (Not on CPAP)  #Critical Illness Polyneuropathy   -s/p VV ECMO, s/p diuresis, TTE/ROBERT with RV failure, s/p treatment for pneumonia  -Repeat Echo 3/11 showed EF 66 %. RV is not well visualized, enlarged RV cavity size a/ reduced RV systolic function. No significant valvular disease.  No echocardiographic evidence of pulmonary hypertension  -s/p Trach (decannulated 3/28) and PEG (removed 4/15)   -Saturating well on RA. Continue duoneb prn  -BIPAP qhs (FiO2 40%, 18/10) via nasal mask   -Fall precaution  -Pain control per rehab team   6/10: recurrent hypoxia while orthostatic: use 2L nc to keep sao2>92%. obtain chest xr.   6/11: new RUL PNA on Chest xr. personally reviewed XR and compared to prior. sight haziness in RUL which is new. no sxs except elevated HR. hypoxia was transient yest. clinically looks better today. will check labs in am. start rocephin to over gram neg organisms w. complex hx. encouraged use of cpap at night. obtain sputum culture if able. low suspicion for aspiratin. elevate HOB    #p-AFIB  #Sinus Tachycardia (Resolved)  -Patient has intermittent tachycardia. Per patient, His HR  mostly above 110 even when he is not sick. follows with cardio OP  -5/3: EKG: NSR@94 bpm  -Eliquis   -Off norvasc  -Continue metoprolol, reduced dose to 25mg BID given soft BP (6/4)  -TSH WNL 2/25  -Sinus tachycardia likely multifactorial including pain and deconditioning. Low suspicion for PE, no SOB/hypoxia and he is already on full AC  - 6/10 ekg sinus. BB dose decreased d/t OH.  6/11: sinus tach. asymptomatic. missed BB yest evening. on lower dose BB today. - 500cc IVF over 5 hrs.     #RIJ and Bilateral LE DVT  -Duplex RUE 3/14 showed Acute, non-occlusive deep vein thrombosis noted within the right internal jugular vein. Superficial vein thrombosis noted within the right antecubital cephalic vein  -Duplex LE 3/14 showed Acute, occlusive deep vein thromboses noted within the right and left peroneal veins at both mid calves. Age-indeterminate, occlusive deep vein thrombosis visualized within the left gastrocnemius vein at the proximal calf.  -Continue eliquis  -Repeat duplex 4/29 showed No evidence of deep venous thrombosis in either lower extremity.    #Normocytic Anemia - Likely multifactorial  -H/H stable, no evidence of active bleed   -Monitor CBC    #HTN  -Amlodipine d/melly  -Continue Metoprolol, reduced dose to 25mg BID with holding parameters  -Monitor BP     #History of Pseudotumor Cerebri   -Outpatient follow up     #ELLA - resolved  -Baseline Cr appears to be ~0.8 range   -ELLA felt to be multifactorial in setting of cardiorenal w/ RVE, recurrent ELLA suspected due to vancomycin toxicity and diuresis   -Continue to encourage oral hydration   -Renvela   -Low phos diet  -Monitor BMP and phos level     #Type 2 Diabetes, HbA1C 4.9 (4/28)  -FS wnl. Monitor glucose on routine lab, controlled     #Urinary Retention  -Flomax 5/1  -Renal u/s 5/20 unremarkable    #Mood  -Continue Seroquel    #DVT ppx - Eliquis  #GI ppx - PPI    Discussed with rehab team at bedside.

## 2025-06-12 ENCOUNTER — TRANSCRIPTION ENCOUNTER (OUTPATIENT)
Age: 40
End: 2025-06-12

## 2025-06-12 LAB
ALBUMIN SERPL ELPH-MCNC: 2.2 G/DL — LOW (ref 3.3–5)
ALP SERPL-CCNC: 60 U/L — SIGNIFICANT CHANGE UP (ref 40–120)
ALT FLD-CCNC: 11 U/L — SIGNIFICANT CHANGE UP (ref 10–45)
ANION GAP SERPL CALC-SCNC: 7 MMOL/L — SIGNIFICANT CHANGE UP (ref 5–17)
AST SERPL-CCNC: 9 U/L — LOW (ref 10–40)
BASOPHILS # BLD AUTO: 0.04 K/UL — SIGNIFICANT CHANGE UP (ref 0–0.2)
BASOPHILS NFR BLD AUTO: 0.6 % — SIGNIFICANT CHANGE UP (ref 0–2)
BILIRUB SERPL-MCNC: 0.3 MG/DL — SIGNIFICANT CHANGE UP (ref 0.2–1.2)
BUN SERPL-MCNC: 14 MG/DL — SIGNIFICANT CHANGE UP (ref 7–23)
CALCIUM SERPL-MCNC: 9.1 MG/DL — SIGNIFICANT CHANGE UP (ref 8.4–10.5)
CHLORIDE SERPL-SCNC: 106 MMOL/L — SIGNIFICANT CHANGE UP (ref 96–108)
CO2 SERPL-SCNC: 29 MMOL/L — SIGNIFICANT CHANGE UP (ref 22–31)
CREAT SERPL-MCNC: 1.13 MG/DL — SIGNIFICANT CHANGE UP (ref 0.5–1.3)
EGFR: 86 ML/MIN/1.73M2 — SIGNIFICANT CHANGE UP
EGFR: 86 ML/MIN/1.73M2 — SIGNIFICANT CHANGE UP
EOSINOPHIL # BLD AUTO: 0.15 K/UL — SIGNIFICANT CHANGE UP (ref 0–0.5)
EOSINOPHIL NFR BLD AUTO: 2.1 % — SIGNIFICANT CHANGE UP (ref 0–6)
GLUCOSE SERPL-MCNC: 96 MG/DL — SIGNIFICANT CHANGE UP (ref 70–99)
HCT VFR BLD CALC: 30.9 % — LOW (ref 39–50)
HGB BLD-MCNC: 9.1 G/DL — LOW (ref 13–17)
IMM GRANULOCYTES NFR BLD AUTO: 0.3 % — SIGNIFICANT CHANGE UP (ref 0–0.9)
LYMPHOCYTES # BLD AUTO: 2.2 K/UL — SIGNIFICANT CHANGE UP (ref 1–3.3)
LYMPHOCYTES # BLD AUTO: 31.4 % — SIGNIFICANT CHANGE UP (ref 13–44)
MCHC RBC-ENTMCNC: 25.1 PG — LOW (ref 27–34)
MCHC RBC-ENTMCNC: 29.4 G/DL — LOW (ref 32–36)
MCV RBC AUTO: 85.4 FL — SIGNIFICANT CHANGE UP (ref 80–100)
MONOCYTES # BLD AUTO: 1.03 K/UL — HIGH (ref 0–0.9)
MONOCYTES NFR BLD AUTO: 14.7 % — HIGH (ref 2–14)
NEUTROPHILS # BLD AUTO: 3.57 K/UL — SIGNIFICANT CHANGE UP (ref 1.8–7.4)
NEUTROPHILS NFR BLD AUTO: 50.9 % — SIGNIFICANT CHANGE UP (ref 43–77)
NRBC BLD AUTO-RTO: 0 /100 WBCS — SIGNIFICANT CHANGE UP (ref 0–0)
PLATELET # BLD AUTO: 361 K/UL — SIGNIFICANT CHANGE UP (ref 150–400)
POTASSIUM SERPL-MCNC: 3.7 MMOL/L — SIGNIFICANT CHANGE UP (ref 3.5–5.3)
POTASSIUM SERPL-SCNC: 3.7 MMOL/L — SIGNIFICANT CHANGE UP (ref 3.5–5.3)
PROT SERPL-MCNC: 7.1 G/DL — SIGNIFICANT CHANGE UP (ref 6–8.3)
RBC # BLD: 3.62 M/UL — LOW (ref 4.2–5.8)
RBC # FLD: 18.9 % — HIGH (ref 10.3–14.5)
SODIUM SERPL-SCNC: 142 MMOL/L — SIGNIFICANT CHANGE UP (ref 135–145)
WBC # BLD: 7.01 K/UL — SIGNIFICANT CHANGE UP (ref 3.8–10.5)
WBC # FLD AUTO: 7.01 K/UL — SIGNIFICANT CHANGE UP (ref 3.8–10.5)

## 2025-06-12 PROCEDURE — 99232 SBSQ HOSP IP/OBS MODERATE 35: CPT

## 2025-06-12 RX ORDER — COLLAGENASE CLOSTRIDIUM HIST. 250 UNIT/G
1 OINTMENT (GRAM) TOPICAL
Qty: 2 | Refills: 0
Start: 2025-06-12 | End: 2025-07-11

## 2025-06-12 RX ORDER — OXYCODONE HYDROCHLORIDE 30 MG/1
1 TABLET ORAL
Qty: 28 | Refills: 0
Start: 2025-06-12 | End: 2025-06-18

## 2025-06-12 RX ORDER — MAGNESIUM OXIDE 400 MG
1 TABLET ORAL
Qty: 90 | Refills: 0
Start: 2025-06-12 | End: 2025-07-11

## 2025-06-12 RX ORDER — APIXABAN 2.5 MG/1
1 TABLET, FILM COATED ORAL
Qty: 60 | Refills: 0
Start: 2025-06-12 | End: 2025-07-11

## 2025-06-12 RX ORDER — GABAPENTIN 400 MG/1
3 CAPSULE ORAL
Qty: 270 | Refills: 0
Start: 2025-06-12 | End: 2025-07-11

## 2025-06-12 RX ORDER — AMOXICILLIN AND CLAVULANATE POTASSIUM 500; 125 MG/1; MG/1
1 TABLET, FILM COATED ORAL
Qty: 10 | Refills: 0
Start: 2025-06-12 | End: 2025-06-16

## 2025-06-12 RX ORDER — NALOXEGOL OXALATE 12.5 MG/1
1 TABLET, FILM COATED ORAL
Qty: 30 | Refills: 0
Start: 2025-06-12 | End: 2025-07-11

## 2025-06-12 RX ORDER — QUETIAPINE FUMARATE 25 MG/1
1 TABLET ORAL
Qty: 30 | Refills: 0
Start: 2025-06-12 | End: 2025-07-11

## 2025-06-12 RX ORDER — CLOTRIMAZOLE 1 G/100G
1 CREAM TOPICAL
Qty: 1 | Refills: 0
Start: 2025-06-12 | End: 2025-07-11

## 2025-06-12 RX ORDER — METOPROLOL SUCCINATE 50 MG/1
12.5 TABLET, EXTENDED RELEASE ORAL
Qty: 0 | Refills: 0 | DISCHARGE
Start: 2025-06-12

## 2025-06-12 RX ORDER — MUPIROCIN CALCIUM 20 MG/G
1 CREAM TOPICAL
Qty: 1 | Refills: 0
Start: 2025-06-12 | End: 2025-07-11

## 2025-06-12 RX ORDER — ALBUTEROL SULFATE 2.5 MG/3ML
2 VIAL, NEBULIZER (ML) INHALATION
Qty: 2 | Refills: 0
Start: 2025-06-12 | End: 2025-07-11

## 2025-06-12 RX ORDER — SEVELAMER HYDROCHLORIDE 800 MG/1
1 TABLET ORAL
Qty: 90 | Refills: 0
Start: 2025-06-12 | End: 2025-07-11

## 2025-06-12 RX ADMIN — NALOXEGOL OXALATE 25 MILLIGRAM(S): 12.5 TABLET, FILM COATED ORAL at 14:00

## 2025-06-12 RX ADMIN — METOPROLOL SUCCINATE 12.5 MILLIGRAM(S): 50 TABLET, EXTENDED RELEASE ORAL at 17:27

## 2025-06-12 RX ADMIN — CEFTRIAXONE 100 MILLIGRAM(S): 500 INJECTION, POWDER, FOR SOLUTION INTRAMUSCULAR; INTRAVENOUS at 22:50

## 2025-06-12 RX ADMIN — OXYCODONE HYDROCHLORIDE 20 MILLIGRAM(S): 30 TABLET ORAL at 22:48

## 2025-06-12 RX ADMIN — Medication 1 APPLICATION(S): at 17:26

## 2025-06-12 RX ADMIN — SEVELAMER HYDROCHLORIDE 800 MILLIGRAM(S): 800 TABLET ORAL at 08:59

## 2025-06-12 RX ADMIN — GABAPENTIN 1200 MILLIGRAM(S): 400 CAPSULE ORAL at 23:05

## 2025-06-12 RX ADMIN — SODIUM HYPOCHLORITE 1 APPLICATION(S): 0.12 SOLUTION TOPICAL at 06:41

## 2025-06-12 RX ADMIN — Medication 400 MILLIGRAM(S): at 17:23

## 2025-06-12 RX ADMIN — Medication 1 APPLICATION(S): at 17:25

## 2025-06-12 RX ADMIN — Medication 400 MILLIGRAM(S): at 08:42

## 2025-06-12 RX ADMIN — QUETIAPINE FUMARATE 25 MILLIGRAM(S): 25 TABLET ORAL at 23:05

## 2025-06-12 RX ADMIN — Medication 220 MILLIGRAM(S): at 14:00

## 2025-06-12 RX ADMIN — SODIUM HYPOCHLORITE 1 APPLICATION(S): 0.12 SOLUTION TOPICAL at 17:26

## 2025-06-12 RX ADMIN — APIXABAN 5 MILLIGRAM(S): 2.5 TABLET, FILM COATED ORAL at 06:40

## 2025-06-12 RX ADMIN — OXYCODONE HYDROCHLORIDE 20 MILLIGRAM(S): 30 TABLET ORAL at 13:16

## 2025-06-12 RX ADMIN — GABAPENTIN 1200 MILLIGRAM(S): 400 CAPSULE ORAL at 14:04

## 2025-06-12 RX ADMIN — APIXABAN 5 MILLIGRAM(S): 2.5 TABLET, FILM COATED ORAL at 17:23

## 2025-06-12 RX ADMIN — Medication 1 TABLET(S): at 14:00

## 2025-06-12 RX ADMIN — Medication 1 APPLICATION(S): at 06:41

## 2025-06-12 RX ADMIN — Medication 400 MILLIGRAM(S): at 14:00

## 2025-06-12 RX ADMIN — Medication 500 MILLIGRAM(S): at 13:59

## 2025-06-12 RX ADMIN — WHITE PETROLATUM 1 APPLICATION(S): 1 OINTMENT TOPICAL at 14:01

## 2025-06-12 RX ADMIN — CLOTRIMAZOLE 1 APPLICATION(S): 1 CREAM TOPICAL at 14:02

## 2025-06-12 RX ADMIN — OXYCODONE HYDROCHLORIDE 20 MILLIGRAM(S): 30 TABLET ORAL at 23:40

## 2025-06-12 RX ADMIN — GABAPENTIN 1200 MILLIGRAM(S): 400 CAPSULE ORAL at 06:40

## 2025-06-12 RX ADMIN — Medication 9 MILLIGRAM(S): at 22:48

## 2025-06-12 RX ADMIN — MUPIROCIN CALCIUM 1 APPLICATION(S): 20 CREAM TOPICAL at 17:29

## 2025-06-12 RX ADMIN — Medication 40 MILLIGRAM(S): at 06:40

## 2025-06-12 NOTE — DISCHARGE NOTE NURSING/CASE MANAGEMENT/SOCIAL WORK - PATIENT PORTAL LINK FT
You can access the FollowMyHealth Patient Portal offered by [facility] by registering at the following website: [email or web address]. By joining [employer]’s FollowMyHealth portal, you will also be able to view your health information using other applications (apps) compatible with our system.

## 2025-06-12 NOTE — PROGRESS NOTE ADULT - SUBJECTIVE AND OBJECTIVE BOX
CC: Patient is a 39y old  Male who presents with a chief complaint of Debility secondary to acute hypoxic respiratory failure (11 Jun 2025 14:40)      Interval History:  Patient seen and examined at bedside.  No overnight events  No complaints this morning  denies sob, cp, palpitations. no lighgtheadedness. no recurrence of abdominal discomfort     ALLERGIES:  No Known Allergies    MEDICATIONS  (STANDING):  ammonium lactate 12% Lotion 1 Application(s) Topical two times a day  apixaban 5 milliGRAM(s) Oral two times a day  ascorbic acid 500 milliGRAM(s) Oral daily  bisacodyl 10 milliGRAM(s) Oral at bedtime  capsaicin 0.025% Cream 1 Application(s) Topical four times a day  cefTRIAXone   IVPB 1000 milliGRAM(s) IV Intermittent every 24 hours  clotrimazole 1% Cream 1 Application(s) Topical <User Schedule>  collagenase Ointment 1 Application(s) Topical two times a day  Dakins Solution - 1/4 Strength 1 Application(s) Topical two times a day  dextrose 5%. 1000 milliLiter(s) (50 mL/Hr) IV Continuous <Continuous>  dextrose 5%. 1000 milliLiter(s) (100 mL/Hr) IV Continuous <Continuous>  dextrose 50% Injectable 25 Gram(s) IV Push once  dextrose 50% Injectable 12.5 Gram(s) IV Push once  dextrose 50% Injectable 25 Gram(s) IV Push once  gabapentin 1200 milliGRAM(s) Oral every 8 hours  glucagon  Injectable 1 milliGRAM(s) IntraMuscular once  influenza   Vaccine 0.5 milliLiter(s) IntraMuscular once  lactated ringers. 500 milliLiter(s) (100 mL/Hr) IV Continuous <Continuous>  lidocaine   4% Patch 1 Patch Transdermal daily  lidocaine   4% Patch 1 Patch Transdermal daily  magnesium oxide 400 milliGRAM(s) Oral three times a day with meals  melatonin 9 milliGRAM(s) Oral at bedtime  metoprolol tartrate 12.5 milliGRAM(s) Oral two times a day  multivitamin 1 Tablet(s) Oral daily  mupirocin 2% Ointment 1 Application(s) Topical two times a day  naloxegol 25 milliGRAM(s) Oral daily  pantoprazole    Tablet 40 milliGRAM(s) Oral before breakfast  petrolatum white Ointment 1 Application(s) Topical every 8 hours  polyethylene glycol 3350 17 Gram(s) Oral two times a day  povidone iodine 10% Solution 1 Application(s) Topical daily  QUEtiapine 25 milliGRAM(s) Oral at bedtime  sevelamer carbonate 800 milliGRAM(s) Oral three times a day with meals  silver nitrate Applicator 6 Application(s) Topical once  zinc sulfate 220 milliGRAM(s) Oral daily    MEDICATIONS  (PRN):  albuterol/ipratropium for Nebulization 3 milliLiter(s) Nebulizer every 6 hours PRN Shortness of Breath and/or Wheezing  dextrose Oral Gel 15 Gram(s) Oral once PRN Blood Glucose LESS THAN 70 milliGRAM(s)/deciliter  oxyCODONE    IR 20 milliGRAM(s) Oral every 6 hours PRN Severe Pain (7 - 10)  oxyCODONE    IR 15 milliGRAM(s) Oral two times a day PRN Moderate Pain (4 - 6)  oxyCODONE    IR 10 milliGRAM(s) Oral two times a day PRN Mild Pain (1 - 3)    Vital Signs Last 24 Hrs  T(F): 98 (11 Jun 2025 19:29), Max: 98 (11 Jun 2025 19:29)  HR: 104 (12 Jun 2025 06:24) (95 - 127)  BP: 104/67 (12 Jun 2025 06:24) (98/64 - 115/74)  RR: 16 (11 Jun 2025 19:29) (16 - 16)  SpO2: 96% (11 Jun 2025 19:29) (94% - 96%)  I&O's Summary        PHYSICAL EXAM:  GENERAL: NAD. in chair  HEENT: NCAT  CHEST/LUNG: grossly Clear to auscultation b/l. on room air. no resp distress. normal effort.   HEART: Regular rate and rhythm  ABDOMEN: Soft, Nontender, Nondistended; Bowel sounds present obese  PSYCH: Appropriate affect  NEURO: Alert & Oriented x 3    LABS:                        9.1    7.01  )-----------( 361      ( 12 Jun 2025 06:05 )             30.9       06-12    142  |  106  |  14  ----------------------------<  96  3.7   |  29  |  1.13    Ca    9.1      12 Jun 2025 06:05    TPro  7.1  /  Alb  2.2  /  TBili  0.3  /  DBili  x   /  AST  9   /  ALT  11  /  AlkPhos  60  06-12       Urinalysis Basic - ( 12 Jun 2025 06:05 )    Color: x / Appearance: x / SG: x / pH: x  Gluc: 96 mg/dL / Ketone: x  / Bili: x / Urobili: x   Blood: x / Protein: x / Nitrite: x   Leuk Esterase: x / RBC: x / WBC x   Sq Epi: x / Non Sq Epi: x / Bacteria: x            Care Discussed with Consultants/Other Providers: Yes

## 2025-06-12 NOTE — PROGRESS NOTE ADULT - ASSESSMENT
39 year old male with PMH of morbid obesity, BEA on CPAP, pAFIB (not on AC prior to admission), HTN, and BL papilledema attributed to pseudotumor cerebri; who initially presented to  on 2/24 with SOB and BL LE edema. Found to have URI with progressive SOB and multifocal PNA on imaging. His hospital course was complicated by worsening respiratory distress and increased 02 demands secondary to secretions (requiring multiple intubation attempts) and eventual MICU admission. While in MICU, he 02 requirements continued despite optimal vent management. Concern noted for progressive ARDS, unable to prone given morbid obesity, and ultimately cannulated for vvECMO on 2/25 and transferred to [facility] for further management on 2/26.  His hospital course at [facility] was significant for the following: diuretic and ABX management, s/p trache and PEG (placed on 3/7- PEG since removed), RCU admission, high grade fevers (secondary to panniculitis), progressively worsening sacral ulcer (likely osteomyelitis- s/p surgical debridement), BL foot wound (secondary to pressor use), neuropathy (secondary to critical illness polyneuropathy), ELLA (secondary to vancomycin toxicity and overdiuresis- since DCd- with improvement). Patient now admitted for a multidisciplinary rehab program. 04-25-25 @ 15:19    * Continued and remarkable functional improvement, reports pain b/l feet and calves attributes it to effect of increased ambulatory distance - continue Gabapentin  * Persisting/significant DF weakness - await orthotic eval for BL AFOs    * SW following for DC planning and arranging wound care teaching with family   * CXR with noted RUL infiltrate - Ceftriaxone IV - transition to oral ABX on DC  * DC home 6/13     #Acute on Chronic Hypercapnic Respiratory Failure due to Morbid Obesity/OHS in setting of RTD  #Critical Illness Myopathy  #Morbid Obesity  #OHS/BEA on Bipap  - Gait Instability, ADL impairments and Functional impairments: start Comprehensive Rehab Program of PT/OT  - 3 hours a day, 5 days a week   - PRN: Nebulizer QID   - Acute on Chronic Hypercapnic Respiratory Failure due to patient morbid obesity (BMI of 56.6) which is causing restriction of the thoracic cage not allowing for proper gas exchange.  The patient is having restrictive disease secondary to OHS as indicated by PFTs. Patient is currently hospitalized with diagnosis of acute on chronic hypercapnic respiratory failure secondary to thoracic restrictive disease related/consequent to OHS and/or morbid obesity. Patient demonstrates shortness of breath and accessory muscle use at rest. Despite the nightly use of BIPAP16/5 the patient remained hypercapnic with a PCO2 of 67.  The patient requires volume augmented ventilation with targeted tidal volume not found on a standard BiPAP for home use. The patient would benefit from NIV, otherwise would be at risk for further deterioration, readmissions and possible patient harm. Patient will need NIV for home.   - CXR with noted RUL infiltrate - start Ceftriaxone IV - transition to oral ABX on DC    # Persisting/significant DF weakness AFOs  - Orthotic eval for BL solid AFOs   - This patient has a diagnosis of BL foot drop. Patient is ambulatory. Patient requires a custom molded hinged AFO to preserve ankle motion while stabilizing talar and subtalar joints. Device will give a more natural gait without destabilizing the knee and hip. Device requires a low density lining to protect prominent roderick areas of effected skin. Patient will need the device for a term of greater than 9 months. No reasonable prefabricated orthosis exists.       #Sacral Osteomyelitis  - Zosyn TID - completed on 5/12/25    #Paroxysmal AFIB  - Eliquis 5mg BID   --low dose metoprolol 12.5mg bid (dose reduced 6/10 after RRT for hypotension)     #Calf Tenderness/pain b/l feet  - BL LE doppler negative 5/21   --Apply offloading boots when OOB      #L hip pain - resolved  - no acute findings on XR or CT   - L hip MRI (5/7) no significant findings     #HTN  - Amlodipine 10mg dialy     #Sleep/Mood  - Melatonin 9mg at HS   - Quetiapine 25mg at HS     #Skin  Wound Recs per Plastic Surgery (5/16):  Sacral Pressure Injury: BID- Cleanse with NS, remove excess fluid, apply santyl ointment to sloughing tissues, pack wound with 1-inch dry plain packing, cover with 4 inch gauze and abd combine  - R buttock: BID- Cleanse with NS, overlay calcium alginate, ABD combine dressing  Additional wound care instructions:  -->Right anterior thigh to groin isolated wound: Clean wound and periwound skin with NS. Pat dry. Apply liquid barrier film to periwound skin, allow to dry. Cover with silicone foam with border. Change daily or PRN if soiled   --> Bilateral abdominal pannus, bilateral groin: Cleanse with luke-warm soap and water, dry well. Apply clotrimazole  --> Bilateral feet: Apply betadine  to bilateral foot wounds followed by 4x4 gauze, ABD pad, and matilde daily per podiatry.  --> Continue to offload pressure; bariatric low airloss support surface, turn and position per protocol with use of fluidized positioning devices, continue incontinence and moisture care per protocol and use of single breathable incontinence pads, continue to offload heels with fluidized positioning devices. Continue use of bariatric seat cushion, limit sit time- no more than 2 consecutive hours at any given time.  - Pressure injury/Skin: OOB to Chair, PT/OT    - Ammonium lactate to BL LEs     #Neuropathy/sacral ulcer  - Gabapentin 1200mg TID --aim to taper to lower dose    #Pain Mgmt   - Capsaicin cream to BL feet QID - Avoid use on damaged, broken, or irritated skin. Avoid occlusive dressing or heat   - Lidocaine patch to BL thighs   - PRN:; Oxycodone and Tylenol 650mg QID   -celebrex 100mg bid for sacral pain   - neuropathic pain b/l feet c/w   gabapentin    #Morbid obesity  - Most recent weight 394lbs (5/7)     #GI/Bowel Mgmt   - Pantoprazole  - Bisacodyl 10mg at HS   - Miralax   - Naloxegal 25mg daily     #/Bladder Mgmt   #ELLA  - USKB 5/20 negative   - Renvela 800mg TID    #FEN --Morbid obesity (BMI > 40).  - Diet - Regular + Thins  [CCHO]    - MVI     # Health Management:   Environmental challenge affecting dc plan--narrow door and need for ramp to house entrance  However, patient making goal directed effort towards therapy goals     #Precautions / PROPHYLAXIS:   - Falls  - ortho: Weight bearing status: WBAT in surgical shoe   - Lungs: Aspiration, Incentive Spirometer   - DVT: apixiban  ----------------------------------------------      Function as at 6/10  Ambulating 75 ft  with RW CS  Barrier--ulcers on feet, limiting wt bearing,  environmental barrier at home (stairs), severe obesity  Est dc 6/13,       ----------------------------------------------  Dr. CANTU's Liaison with Family/Providers:  6/9- Met Patient's father during his visit to the unit during, he expressed happiness with patient's functional progress  We discussed dc planning including plans to address environmental barriers at home  Reports that there is a back entrance through which he could enter the house with limited number of stairs    6/10--Girlfriend present during review, she contributed during review, including discussion on DC planning, plan for the structural barrier at home (few stairs to entrance)  Patient had agreed to get a ramp, working with his aunt, or alternative plan fo railing for the stairs  Agreed to dc plan for 6/13 to home     ----------------------------------------------  OUTPATIENT/FOLLOW UP:    Your Primary Care Provider,   Phone: (   )    -  Fax: (   )    -  Follow Up Time: 1 week    [facility] [place],   [address] [facility] [facility]  Phone: [phone]  Fax: (   )    -  Follow Up Time:    Dr. Waterhouse  Podiatry  [phone]  Within 1 week

## 2025-06-12 NOTE — PROGRESS NOTE ADULT - ASSESSMENT
39 year old male  with PMH of morbid obesity, BEA, pAFIB (not on AC), HTN, and BL papilledema attributed to pseudotumor cerebri; who initially presented to  on 2/24 with SOB and BL LE edema. Found to have URI with progressive SOB and multifocal PNA on imaging. His hospital course was complicated by worsening respiratory distress and increased O2 demands secondary to secretions (requiring multiple intubation attempts) and eventual MICU admission. While in MICU, his O2 requirements continued despite optimal vent management. Concern noted for progressive ARDS, unable to prone given morbid obesity, and ultimately cannulated for vvECMO on 2/25 and transferred to [facility] for further management on 2/26. His hospital course at [facility] was significant for the following: diuretic and ABX management, s/p trach and PEG (placed on 3/7- PEG since removed), RCU admission, high grade fevers (secondary to panniculitis), progressively worsening sacral ulcer (likely osteomyelitis- s/p surgical debridement), BL foot wound (secondary to pressor use), neuropathy (secondary to critical illness polyneuropathy), ELLA (secondary to vancomycin toxicity and overdiuresis- since DCd- with improvement). Patient now admitted for a multidisciplinary rehab program.     #Left hip pain - Improving  -Hip xray--> no fracture  -CT hip: Again seen is a sacral decubitus wound which extends superficial as well as involving the right gluteus musculature and the posterior L5 with small amount of fluid and gas within the tract. Limited involvement on this noncontrast CT. This likely corresponds to adjacent phlegmonous changes  -MRI left hip: Incompletely characterized sacral wound with associated ill-defined collection of fluid and soft tissue gas which extends to/involve the right gluteus zurdo muscle as at CT pelvis study from one day prior. No interval change in the high STIR signal within the bilateral gluteal and thigh musculature suggestive of a myositis as compared to prior MRI study. No fracture, osseous destruction or aggressive periosteal reaction. No osteomyelitis at the left hip. Edema-like signal along the right and left greater trochanteric regions, overall increased as compared to the prior MRI study from 4/11/2025. Findings are felt to be reactive in etiology.  -Continue comprehensive rehab program - PT/OT/SLP per rehab team  -Pain management, bowel regimen per rehab     #Abnormal CT A/P  -5/2: CT A/P: Question of pneumatosis in the cecum with no other evidence of bowel ischemia. Correlation with lactate levels and clinical exam would be helpful.  -Patient has no symptoms. Abdomen is benign. Tolerating PO  -5/2: Surgery cx noted: no intervention. c/w PO. refer to bariatric surgery post-DC dr botello at [place] or Dr. Segovia at [place] [per patient request]  -5/3 GI cx noted: no GI intervention planned. c/w PO    #Stage IV Sacral Decub Ulcer  #Bilateral Buttocks Wounds  #Bilateral Foot Wounds  -5/2 CT A/P: An air and fluid containing collection in the right gluteus zurdo muscle in continuity with a subcutaneous sacral collection. No obvious skin defect to suggest a decubitus ulcer.  -5/7: MRI left hip: Incompletely characterized sacral wound with associated ill-defined collection of fluid and soft tissue gas which extends to/involve the right gluteus zurdo muscle as at CT pelvis study from one day prior. No interval change in the high STIR signal within the bilateral gluteal and thigh musculature suggestive of a myositis as compared to prior MRI study. No fracture, osseous destruction or aggressive periosteal reaction. No osteomyelitis at the left hip. Edema-like signal along the right and left greater trochanteric regions, overall increased as compared to the prior MRI study from 4/11/2025. Findings are felt to be reactive in etiology..  -Continue local wound care  -MVI, vitamin C and zinc   -Off load pressure  -Plastic consult following  -ID follow up noted and appreciated  -Per plastics - continue current management    #Fungemia 2/2 Panniculitis - resolved  -Blood culture 3/15, 3/16 with candida albicans, s/p course of antifungals   -Repeat cultures negative since 3/18    #Debility Secondary to Acute Hypoxic Respiratory Failure/ARDS 2/2 PNA  #BEA (Not on CPAP)  #Critical Illness Polyneuropathy   -s/p VV ECMO, s/p diuresis, TTE/ROBERT with RV failure, s/p treatment for pneumonia  -Repeat Echo 3/11 showed EF 66 %. RV is not well visualized, enlarged RV cavity size a/ reduced RV systolic function. No significant valvular disease.  No echocardiographic evidence of pulmonary hypertension  -s/p Trach (decannulated 3/28) and PEG (removed 4/15)   -Saturating well on RA. Continue duoneb prn  -BIPAP qhs (FiO2 40%, 18/10) via nasal mask   -Fall precaution  -Pain control per rehab team   6/10: recurrent hypoxia while orthostatic: use 2L nc to keep sao2>92%. obtain chest xr.   6/11: new RUL PNA on Chest xr. started rocephin 2/5 to cover gram neg organisms w. complex hx.   - encouraged use of cpap at night. obtain sputum culture if able. low suspicion for aspiratin. elevate HOB    #p-AFIB  #Sinus Tachycardia   #HTN  # Rapid response 6/10/25 d/t +OH with diaphoresis, tachycardia and SBP in 60's upon return to room  -Patient has intermittent tachycardia. Per patient, His HR  mostly above 110 even when he is not sick. follows with cardio OP  -5/3: EKG: NSR@94 bpm  -Eliquis   -Off norvasc  - c/w metoprolol, reduced dose to 12.5mg BID 6/10  -TSH WNL 2/25  -Sinus tachycardia likely multifactorial including pain and deconditioning. Low suspicion for PE, no SOB/hypoxia and he is already on full AC  - 6/10 ekg sinus. BB dose decreased d/t OH. s/p IVF. d/c'ed flomax.   6/11: sinus tach. asymptomatic. missed BB yest evening. on lower dose BB today. - 500cc IVF over 5 hrs.   6/12: HR improved. BP low while sleeping 98/64. repeat BP improved.   -Continue Metoprolol, reduced dose to 12.5mg BID with holding parameters  -Monitor BP     #RIJ and Bilateral LE DVT  -Duplex RUE 3/14 showed Acute, non-occlusive deep vein thrombosis noted within the right internal jugular vein. Superficial vein thrombosis noted within the right antecubital cephalic vein  -Duplex LE 3/14 showed Acute, occlusive deep vein thromboses noted within the right and left peroneal veins at both mid calves. Age-indeterminate, occlusive deep vein thrombosis visualized within the left gastrocnemius vein at the proximal calf.  -Continue eliquis  -Repeat duplex 4/29 showed No evidence of deep venous thrombosis in either lower extremity.    #Normocytic Anemia - Likely multifactorial  -H/H stable, no evidence of active bleed   -Monitor CBC    #History of Pseudotumor Cerebri   -Outpatient follow up     #ELLA - resolved  -Baseline Cr appears to be ~0.8 range   -ELLA felt to be multifactorial in setting of cardiorenal w/ RVE, recurrent ELLA suspected due to vancomycin toxicity and diuresis   -Continue to encourage oral hydration   -Renvela   -Low phos diet  -Monitor BMP and phos level     #Type 2 Diabetes, HbA1C 4.9 (4/28)  -FS wnl. Monitor glucose on routine lab, controlled     #Urinary Retention  -Flomax 5/1  -Renal u/s 5/20 unremarkable    #Mood  -Continue Seroquel    #DVT ppx - Eliquis  #GI ppx - PPI    Discussed with rehab team.   39 year old male  with PMH of morbid obesity, BEA, pAFIB (not on AC), HTN, and BL papilledema attributed to pseudotumor cerebri; who initially presented to  on 2/24 with SOB and BL LE edema. Found to have URI with progressive SOB and multifocal PNA on imaging. His hospital course was complicated by worsening respiratory distress and increased O2 demands secondary to secretions (requiring multiple intubation attempts) and eventual MICU admission. While in MICU, his O2 requirements continued despite optimal vent management. Concern noted for progressive ARDS, unable to prone given morbid obesity, and ultimately cannulated for vvECMO on 2/25 and transferred to [facility] for further management on 2/26. His hospital course at [facility] was significant for the following: diuretic and ABX management, s/p trach and PEG (placed on 3/7- PEG since removed), RCU admission, high grade fevers (secondary to panniculitis), progressively worsening sacral ulcer (likely osteomyelitis- s/p surgical debridement), BL foot wound (secondary to pressor use), neuropathy (secondary to critical illness polyneuropathy), ELLA (secondary to vancomycin toxicity and overdiuresis- since DCd- with improvement). Patient now admitted for a multidisciplinary rehab program.     #Left hip pain - Improving  -Hip xray--> no fracture  -CT hip: Again seen is a sacral decubitus wound which extends superficial as well as involving the right gluteus musculature and the posterior L5 with small amount of fluid and gas within the tract. Limited involvement on this noncontrast CT. This likely corresponds to adjacent phlegmonous changes  -MRI left hip: Incompletely characterized sacral wound with associated ill-defined collection of fluid and soft tissue gas which extends to/involve the right gluteus zurdo muscle as at CT pelvis study from one day prior. No interval change in the high STIR signal within the bilateral gluteal and thigh musculature suggestive of a myositis as compared to prior MRI study. No fracture, osseous destruction or aggressive periosteal reaction. No osteomyelitis at the left hip. Edema-like signal along the right and left greater trochanteric regions, overall increased as compared to the prior MRI study from 4/11/2025. Findings are felt to be reactive in etiology.  -Continue comprehensive rehab program - PT/OT/SLP per rehab team  -Pain management, bowel regimen per rehab     #Abnormal CT A/P  -5/2: CT A/P: Question of pneumatosis in the cecum with no other evidence of bowel ischemia. Correlation with lactate levels and clinical exam would be helpful.  -Patient has no symptoms. Abdomen is benign. Tolerating PO  -5/2: Surgery cx noted: no intervention. c/w PO. refer to bariatric surgery post-DC dr botello at [place] or Dr. Segovia at [place] [per patient request]  -5/3 GI cx noted: no GI intervention planned. c/w PO    #Stage IV Sacral Decub Ulcer  #Bilateral Buttocks Wounds  #Bilateral Foot Wounds  -5/2 CT A/P: An air and fluid containing collection in the right gluteus zurdo muscle in continuity with a subcutaneous sacral collection. No obvious skin defect to suggest a decubitus ulcer.  -5/7: MRI left hip: Incompletely characterized sacral wound with associated ill-defined collection of fluid and soft tissue gas which extends to/involve the right gluteus zurdo muscle as at CT pelvis study from one day prior. No interval change in the high STIR signal within the bilateral gluteal and thigh musculature suggestive of a myositis as compared to prior MRI study. No fracture, osseous destruction or aggressive periosteal reaction. No osteomyelitis at the left hip. Edema-like signal along the right and left greater trochanteric regions, overall increased as compared to the prior MRI study from 4/11/2025. Findings are felt to be reactive in etiology..  -Continue local wound care  -MVI, vitamin C and zinc   -Off load pressure  -Plastic consult following  -ID follow up noted and appreciated  -Per plastics - continue current management    #Fungemia 2/2 Panniculitis - resolved  -Blood culture 3/15, 3/16 with candida albicans, s/p course of antifungals   -Repeat cultures negative since 3/18    #Debility Secondary to Acute Hypoxic Respiratory Failure/ARDS 2/2 PNA  #BEA (Not on CPAP)  #Critical Illness Polyneuropathy   -s/p VV ECMO, s/p diuresis, TTE/ROBERT with RV failure, s/p treatment for pneumonia  -Repeat Echo 3/11 showed EF 66 %. RV is not well visualized, enlarged RV cavity size a/ reduced RV systolic function. No significant valvular disease.  No echocardiographic evidence of pulmonary hypertension  -s/p Trach (decannulated 3/28) and PEG (removed 4/15)   -Saturating well on RA. Continue duoneb prn  -BIPAP qhs (FiO2 40%, 18/10) via nasal mask   -Fall precaution  -Pain control per rehab team   6/10: recurrent hypoxia while orthostatic: use 2L nc to keep sao2>92%. obtain chest xr.   6/11: new RUL PNA on Chest xr. started rocephin 2/5 to cover gram neg organisms w. complex hx.   - encouraged use of cpap at night. obtain sputum culture if able. low suspicion for aspiratin. elevate HOB    #p-AFIB  #Sinus Tachycardia   #HTN  # Rapid response 6/10/25 d/t +OH with diaphoresis, tachycardia and SBP in 60's upon return to room  -Patient has intermittent tachycardia. Per patient, His HR  mostly above 110 even when he is not sick. follows with cardio OP  -5/3: EKG: NSR@94 bpm  -Eliquis   -Off norvasc  - c/w metoprolol, reduced dose to 12.5mg BID 6/10  -TSH WNL 2/25  -Sinus tachycardia likely multifactorial including pain and deconditioning. Low suspicion for PE, no SOB/hypoxia and he is already on full AC  - 6/10 ekg sinus. BB dose decreased d/t OH. s/p IVF. d/c'ed flomax.   6/11: sinus tach. asymptomatic. missed BB yest evening. on lower dose BB today. - 500cc IVF over 5 hrs.   6/12: HR improved. BP low while sleeping 98/64. repeat BP improved.   -Continue Metoprolol, reduced dose to 12.5mg BID with holding parameters  -Monitor BP     #RIJ and Bilateral LE DVT  -Duplex RUE 3/14 showed Acute, non-occlusive deep vein thrombosis noted within the right internal jugular vein. Superficial vein thrombosis noted within the right antecubital cephalic vein  -Duplex LE 3/14 showed Acute, occlusive deep vein thromboses noted within the right and left peroneal veins at both mid calves. Age-indeterminate, occlusive deep vein thrombosis visualized within the left gastrocnemius vein at the proximal calf.  -Continue eliquis  -Repeat duplex 4/29 showed No evidence of deep venous thrombosis in either lower extremity.    #Normocytic Anemia - Likely multifactorial  -H/H stable, no evidence of active bleed   -Monitor CBC    #History of Pseudotumor Cerebri   -Outpatient follow up     #ELLA - resolved  -Baseline Cr appears to be ~0.8 range   -ELLA felt to be multifactorial in setting of cardiorenal w/ RVE, recurrent ELLA suspected due to vancomycin toxicity and diuresis   -Continue to encourage oral hydration   -Renvela   -Low phos diet  -Monitor BMP and phos level     #Type 2 Diabetes, HbA1C 4.9 (4/28)  -FS wnl. Monitor glucose on routine lab, controlled     #Urinary Retention  -Flomax 5/1 - stopped 6/10 d/t OH  -Renal u/s 5/20 unremarkable    #Mood  -Continue Seroquel    #DVT ppx - Eliquis  #GI ppx - PPI    Discussed with rehab team.

## 2025-06-12 NOTE — DISCHARGE NOTE NURSING/CASE MANAGEMENT/SOCIAL WORK - NSSCTYPOFSERV_GEN_ALL_CORE
Pt. O2 sat on RA resting was 94%, O2 sat while ambulating on RA was 86%. Pt. is COVID positive and requires home oxygen.    -Hemant Gomez MD PT/RN/OT/HHA

## 2025-06-12 NOTE — DISCHARGE NOTE NURSING/CASE MANAGEMENT/SOCIAL WORK - NSDCFUADDAPPT_GEN_ALL_CORE_FT
Please follow up with your PCP as soon as possible.    [facility],   [address], [address]  [place] [address]  Phone: [phone]    If you are in need of a PCP or a specialist in your area: contact the [facility] Physician Referral Service at [phone].

## 2025-06-12 NOTE — DISCHARGE NOTE NURSING/CASE MANAGEMENT/SOCIAL WORK - NSDCDMETYPESERV_GEN_ALL_CORE_FT
Hospital bed, commode, walker, wheelchair, BiPAP, (Tub transfer bench is from [employer] [phone])

## 2025-06-12 NOTE — PROGRESS NOTE ADULT - SUBJECTIVE AND OBJECTIVE BOX
Subjective  Patient seen and examined at bedside this AM.  Girlfriend and father present.  Admits to sleeping well.   No pain over sacral wound.  Encouraged use of CPAP and Incentive Spirometer in hospital and upon discharge.   Plan for DC home on 6/13.     ROS--no acute pain, no fever or chest pain   B/l leg and sacral ulcers,  LBM 6/11    Function   -Ambulated 45' x 2 trials with bariatric RW incorporating turns house hold  distances with Close Supervision stand by assist. Steppage gait pattern used  today while wearing sneakers due to weak ankle DF/foot drop.    Therapeutic Activity  -Completed lower body dressing donning pants and sneakers prior to exiting bed  -Rolling to sitting edge of bed needing supervision  -Sit to stand with RW close supervision, SPT with RW close supervision      Vital Signs Last 24 Hrs  T(C): 36.7 (11 Jun 2025 19:29), Max: 36.7 (11 Jun 2025 19:29)  T(F): 98 (11 Jun 2025 19:29), Max: 98 (11 Jun 2025 19:29)  HR: 104 (12 Jun 2025 06:24) (95 - 127)  BP: 104/67 (12 Jun 2025 06:24) (98/64 - 115/74)  BP(mean): --  RR: 16 (11 Jun 2025 19:29) (16 - 16)  SpO2: 96% (11 Jun 2025 19:29) (94% - 96%)    EXAM  Gen - Comfortable, cheerful, motivated   HEENT - NAD  Neck - Supple, No limited ROM  Pulm - Clear  Cardiovascular - RRR, S1S2, No murmurs  Abdomen - Soft, non tender, +BS  Extremities - chronic skin changes with hyperpigmentation at shin, callus on feet, responding to treatment   Ulcer on both feet examined, healthy scab noted     Neuro-  AAO X 3, Speech is normal     Motor - UE 5/5 Hip flexion 3/5, Knee 4/5 PF 4/5, DF 2/5     Sensory - Decreased to light touch bilateral lower extremities      Coordination - impaired lower extremities   Psychiatric - Mood stable, Affect WNL    Skin:  R foot plantar aspect lateral/distal side 7cm x 5.5cm ischemic wound from pressors  RLE 4th digit 1x1.5cm ischemic induced wound from pressors  RLE 5th digit 3x2cm ischemic induced wound from pressors  L foot plantar aspect lateral/distal side 6x7cm ischemic induced wound from pressor usage  LLE 4th digit 2x1cm ischemic induced wound from pressors  LLE 5th digit 2.5x3cm ischemic induced wound from pressors   Unstageable pressure wound cocyx 7cmx4.5cm w/ 2.5cm depth  R buttock pressure induced wound semi contiguous with coccyx wound calling unstageable given prior debridement 8.5x5.5cm  L buttock 1.0x0.5cm stage 3 pressure injury  R buttock skin tear 3.5cm x 0.5cm and 1.5cmx0.5cm  Dry skin with callus on sole of foot     MMS--antigravity upper extremities,    improved strength      LABS:                        9.1    7.01  )-----------( 361      ( 12 Jun 2025 06:05 )             30.9     06-12    142  |  106  |  14  ----------------------------<  96  3.7   |  29  |  1.13    Ca    9.1      12 Jun 2025 06:05    TPro  7.1  /  Alb  2.2[L]  /  TBili  0.3  /  DBili  x   /  AST  9[L]  /  ALT  11  /  AlkPhos  60  06-12      Urinalysis Basic - ( 12 Jun 2025 06:05 )    Color: x / Appearance: x / SG: x / pH: x  Gluc: 96 mg/dL / Ketone: x  / Bili: x / Urobili: x   Blood: x / Protein: x / Nitrite: x   Leuk Esterase: x / RBC: x / WBC x   Sq Epi: x / Non Sq Epi: x / Bacteria: x      CAPILLARY BLOOD GLUCOSE        MEDICATIONS  (STANDING):  ammonium lactate 12% Lotion 1 Application(s) Topical two times a day  apixaban 5 milliGRAM(s) Oral two times a day  ascorbic acid 500 milliGRAM(s) Oral daily  bisacodyl 10 milliGRAM(s) Oral at bedtime  capsaicin 0.025% Cream 1 Application(s) Topical four times a day  cefTRIAXone   IVPB 1000 milliGRAM(s) IV Intermittent every 24 hours  clotrimazole 1% Cream 1 Application(s) Topical <User Schedule>  collagenase Ointment 1 Application(s) Topical two times a day  Dakins Solution - 1/4 Strength 1 Application(s) Topical two times a day  dextrose 5%. 1000 milliLiter(s) (50 mL/Hr) IV Continuous <Continuous>  dextrose 5%. 1000 milliLiter(s) (100 mL/Hr) IV Continuous <Continuous>  dextrose 50% Injectable 25 Gram(s) IV Push once  dextrose 50% Injectable 12.5 Gram(s) IV Push once  dextrose 50% Injectable 25 Gram(s) IV Push once  gabapentin 1200 milliGRAM(s) Oral every 8 hours  glucagon  Injectable 1 milliGRAM(s) IntraMuscular once  influenza   Vaccine 0.5 milliLiter(s) IntraMuscular once  lactated ringers. 500 milliLiter(s) (100 mL/Hr) IV Continuous <Continuous>  lidocaine   4% Patch 1 Patch Transdermal daily  lidocaine   4% Patch 1 Patch Transdermal daily  magnesium oxide 400 milliGRAM(s) Oral three times a day with meals  melatonin 9 milliGRAM(s) Oral at bedtime  metoprolol tartrate 12.5 milliGRAM(s) Oral two times a day  multivitamin 1 Tablet(s) Oral daily  mupirocin 2% Ointment 1 Application(s) Topical two times a day  naloxegol 25 milliGRAM(s) Oral daily  pantoprazole    Tablet 40 milliGRAM(s) Oral before breakfast  petrolatum white Ointment 1 Application(s) Topical every 8 hours  polyethylene glycol 3350 17 Gram(s) Oral two times a day  povidone iodine 10% Solution 1 Application(s) Topical daily  QUEtiapine 25 milliGRAM(s) Oral at bedtime  sevelamer carbonate 800 milliGRAM(s) Oral three times a day with meals  silver nitrate Applicator 6 Application(s) Topical once  zinc sulfate 220 milliGRAM(s) Oral daily      MEDICATIONS  (PRN):  albuterol/ipratropium for Nebulization 3 milliLiter(s) Nebulizer every 6 hours PRN Shortness of Breath and/or Wheezing  dextrose Oral Gel 15 Gram(s) Oral once PRN Blood Glucose LESS THAN 70 milliGRAM(s)/deciliter  oxyCODONE    IR 20 milliGRAM(s) Oral every 6 hours PRN Severe Pain (7 - 10)  oxyCODONE    IR 15 milliGRAM(s) Oral two times a day PRN Moderate Pain (4 - 6)  oxyCODONE    IR 10 milliGRAM(s) Oral two times a day PRN Mild Pain (1 - 3)

## 2025-06-12 NOTE — DISCHARGE NOTE NURSING/CASE MANAGEMENT/SOCIAL WORK - FINANCIAL ASSISTANCE
[facility] provides services at a reduced cost to those who are determined to be eligible through [facility]’s financial assistance program. Information regarding [facility]’s financial assistance program can be found by going to [email or web address] or by calling [phone].

## 2025-06-13 VITALS
HEART RATE: 85 BPM | OXYGEN SATURATION: 94 % | SYSTOLIC BLOOD PRESSURE: 120 MMHG | RESPIRATION RATE: 16 BRPM | TEMPERATURE: 98 F | DIASTOLIC BLOOD PRESSURE: 79 MMHG

## 2025-06-13 PROCEDURE — 83735 ASSAY OF MAGNESIUM: CPT

## 2025-06-13 PROCEDURE — 76770 US EXAM ABDO BACK WALL COMP: CPT

## 2025-06-13 PROCEDURE — 73501 X-RAY EXAM HIP UNI 1 VIEW: CPT

## 2025-06-13 PROCEDURE — 84100 ASSAY OF PHOSPHORUS: CPT

## 2025-06-13 PROCEDURE — 92523 SPEECH SOUND LANG COMPREHEN: CPT | Mod: GN

## 2025-06-13 PROCEDURE — 82962 GLUCOSE BLOOD TEST: CPT

## 2025-06-13 PROCEDURE — 73721 MRI JNT OF LWR EXTRE W/O DYE: CPT

## 2025-06-13 PROCEDURE — 93970 EXTREMITY STUDY: CPT

## 2025-06-13 PROCEDURE — 99232 SBSQ HOSP IP/OBS MODERATE 35: CPT

## 2025-06-13 PROCEDURE — 92610 EVALUATE SWALLOWING FUNCTION: CPT | Mod: GN

## 2025-06-13 PROCEDURE — 97530 THERAPEUTIC ACTIVITIES: CPT | Mod: GP

## 2025-06-13 PROCEDURE — 97161 PT EVAL LOW COMPLEX 20 MIN: CPT | Mod: GP

## 2025-06-13 PROCEDURE — 97165 OT EVAL LOW COMPLEX 30 MIN: CPT | Mod: GO

## 2025-06-13 PROCEDURE — 85025 COMPLETE CBC W/AUTO DIFF WBC: CPT

## 2025-06-13 PROCEDURE — 87635 SARS-COV-2 COVID-19 AMP PRB: CPT

## 2025-06-13 PROCEDURE — 99239 HOSP IP/OBS DSCHRG MGMT >30: CPT

## 2025-06-13 PROCEDURE — 74178 CT ABD&PLV WO CNTR FLWD CNTR: CPT | Mod: MC

## 2025-06-13 PROCEDURE — 97535 SELF CARE MNGMENT TRAINING: CPT | Mod: GO

## 2025-06-13 PROCEDURE — 97110 THERAPEUTIC EXERCISES: CPT | Mod: GP

## 2025-06-13 PROCEDURE — 80053 COMPREHEN METABOLIC PANEL: CPT

## 2025-06-13 PROCEDURE — 83036 HEMOGLOBIN GLYCOSYLATED A1C: CPT

## 2025-06-13 PROCEDURE — 80048 BASIC METABOLIC PNL TOTAL CA: CPT

## 2025-06-13 PROCEDURE — 97542 WHEELCHAIR MNGMENT TRAINING: CPT | Mod: GO

## 2025-06-13 PROCEDURE — 71045 X-RAY EXAM CHEST 1 VIEW: CPT

## 2025-06-13 PROCEDURE — 72192 CT PELVIS W/O DYE: CPT | Mod: MC

## 2025-06-13 PROCEDURE — 93005 ELECTROCARDIOGRAM TRACING: CPT

## 2025-06-13 PROCEDURE — 97116 GAIT TRAINING THERAPY: CPT | Mod: GP

## 2025-06-13 PROCEDURE — 36415 COLL VENOUS BLD VENIPUNCTURE: CPT

## 2025-06-13 RX ORDER — METOPROLOL SUCCINATE 50 MG/1
12.5 TABLET, EXTENDED RELEASE ORAL ONCE
Refills: 0 | Status: COMPLETED | OUTPATIENT
Start: 2025-06-13 | End: 2025-06-13

## 2025-06-13 RX ADMIN — Medication 220 MILLIGRAM(S): at 12:48

## 2025-06-13 RX ADMIN — SEVELAMER HYDROCHLORIDE 800 MILLIGRAM(S): 800 TABLET ORAL at 12:47

## 2025-06-13 RX ADMIN — SEVELAMER HYDROCHLORIDE 800 MILLIGRAM(S): 800 TABLET ORAL at 08:31

## 2025-06-13 RX ADMIN — OXYCODONE HYDROCHLORIDE 20 MILLIGRAM(S): 30 TABLET ORAL at 12:47

## 2025-06-13 RX ADMIN — CLOTRIMAZOLE 1 APPLICATION(S): 1 CREAM TOPICAL at 12:49

## 2025-06-13 RX ADMIN — GABAPENTIN 1200 MILLIGRAM(S): 400 CAPSULE ORAL at 05:33

## 2025-06-13 RX ADMIN — METOPROLOL SUCCINATE 12.5 MILLIGRAM(S): 50 TABLET, EXTENDED RELEASE ORAL at 08:54

## 2025-06-13 RX ADMIN — Medication 1 TABLET(S): at 12:48

## 2025-06-13 RX ADMIN — Medication 1 APPLICATION(S): at 12:48

## 2025-06-13 RX ADMIN — Medication 400 MILLIGRAM(S): at 08:32

## 2025-06-13 RX ADMIN — Medication 40 MILLIGRAM(S): at 06:09

## 2025-06-13 RX ADMIN — APIXABAN 5 MILLIGRAM(S): 2.5 TABLET, FILM COATED ORAL at 05:35

## 2025-06-13 RX ADMIN — Medication 400 MILLIGRAM(S): at 12:47

## 2025-06-13 RX ADMIN — Medication 500 MILLIGRAM(S): at 12:48

## 2025-06-13 RX ADMIN — GABAPENTIN 1200 MILLIGRAM(S): 400 CAPSULE ORAL at 13:03

## 2025-06-13 NOTE — PROGRESS NOTE ADULT - NS ATTEND OPT1A GEN_ALL_CORE
History/Exam/Medical decision making
Exam/Medical decision making
History/Exam/Medical decision making

## 2025-06-13 NOTE — PROGRESS NOTE ADULT - SUBJECTIVE AND OBJECTIVE BOX
Subjective  Patient seen and examined at bedside this AM.  Admits to sleeping well.   No pain over sacral wound.  Encouraged use of CPAP and Incentive Spirometer in hospital and upon discharge.   Plan for DC home today.    ROS--no acute pain, no fever or chest pain   B/l leg and sacral ulcers,  LBM 6/11    Function   -Ambulated 45' x 2 trials with bariatric RW incorporating turns house hold  distances with Close Supervision stand by assist. Steppage gait pattern used  today while wearing sneakers due to weak ankle DF/foot drop.    Therapeutic Activity  -Completed lower body dressing donning pants and sneakers prior to exiting bed  -Rolling to sitting edge of bed needing supervision  -Sit to stand with RW close supervision, SPT with RW close supervision      Vital Signs Last 24 Hrs  T(C): 36.6 (13 Jun 2025 08:27), Max: 36.7 (12 Jun 2025 13:48)  T(F): 97.9 (13 Jun 2025 08:27), Max: 98.1 (12 Jun 2025 22:26)  HR: 85 (13 Jun 2025 08:27) (85 - 104)  BP: 120/79 (13 Jun 2025 08:27) (98/63 - 137/82)  BP(mean): --  RR: 16 (13 Jun 2025 08:27) (16 - 18)  SpO2: 94% (13 Jun 2025 08:27) (94% - 98%)    EXAM  Gen - Comfortable, cheerful, motivated   HEENT - NAD  Neck - Supple, No limited ROM  Pulm - Clear  Cardiovascular - RRR, S1S2, No murmurs  Abdomen - Soft, non tender, +BS  Extremities - chronic skin changes with hyperpigmentation at shin, callus on feet, responding to treatment   Ulcer on both feet examined, healthy scab noted     Neuro-  AAO X 3, Speech is normal     Motor - UE 5/5 Hip flexion 3/5, Knee 4/5 PF 4/5, DF 2/5     Sensory - Decreased to light touch bilateral lower extremities      Coordination - impaired lower extremities   Psychiatric - Mood stable, Affect WNL    Skin:  R foot plantar aspect lateral/distal side 7cm x 5.5cm ischemic wound from pressors  RLE 4th digit 1x1.5cm ischemic induced wound from pressors  RLE 5th digit 3x2cm ischemic induced wound from pressors  L foot plantar aspect lateral/distal side 6x7cm ischemic induced wound from pressor usage  LLE 4th digit 2x1cm ischemic induced wound from pressors  LLE 5th digit 2.5x3cm ischemic induced wound from pressors   Unstageable pressure wound cocyx 7cmx4.5cm w/ 2.5cm depth  R buttock pressure induced wound semi contiguous with coccyx wound calling unstageable given prior debridement 8.5x5.5cm  L buttock 1.0x0.5cm stage 3 pressure injury  R buttock skin tear 3.5cm x 0.5cm and 1.5cmx0.5cm  Dry skin with callus on sole of foot     MMS--antigravity upper extremities,    improved strength      LABS:                        9.1    7.01  )-----------( 361      ( 12 Jun 2025 06:05 )             30.9     06-12    142  |  106  |  14  ----------------------------<  96  3.7   |  29  |  1.13    Ca    9.1      12 Jun 2025 06:05    TPro  7.1  /  Alb  2.2[L]  /  TBili  0.3  /  DBili  x   /  AST  9[L]  /  ALT  11  /  AlkPhos  60  06-12      Urinalysis Basic - ( 12 Jun 2025 06:05 )    Color: x / Appearance: x / SG: x / pH: x  Gluc: 96 mg/dL / Ketone: x  / Bili: x / Urobili: x   Blood: x / Protein: x / Nitrite: x   Leuk Esterase: x / RBC: x / WBC x   Sq Epi: x / Non Sq Epi: x / Bacteria: x      CAPILLARY BLOOD GLUCOSE      MEDICATIONS  (STANDING):  ammonium lactate 12% Lotion 1 Application(s) Topical two times a day  apixaban 5 milliGRAM(s) Oral two times a day  ascorbic acid 500 milliGRAM(s) Oral daily  bisacodyl 10 milliGRAM(s) Oral at bedtime  capsaicin 0.025% Cream 1 Application(s) Topical four times a day  cefTRIAXone   IVPB 1000 milliGRAM(s) IV Intermittent every 24 hours  clotrimazole 1% Cream 1 Application(s) Topical <User Schedule>  collagenase Ointment 1 Application(s) Topical two times a day  Dakins Solution - 1/4 Strength 1 Application(s) Topical two times a day  dextrose 5%. 1000 milliLiter(s) (100 mL/Hr) IV Continuous <Continuous>  dextrose 5%. 1000 milliLiter(s) (50 mL/Hr) IV Continuous <Continuous>  dextrose 50% Injectable 25 Gram(s) IV Push once  dextrose 50% Injectable 12.5 Gram(s) IV Push once  dextrose 50% Injectable 25 Gram(s) IV Push once  gabapentin 1200 milliGRAM(s) Oral every 8 hours  glucagon  Injectable 1 milliGRAM(s) IntraMuscular once  influenza   Vaccine 0.5 milliLiter(s) IntraMuscular once  lactated ringers. 500 milliLiter(s) (100 mL/Hr) IV Continuous <Continuous>  lidocaine   4% Patch 1 Patch Transdermal daily  lidocaine   4% Patch 1 Patch Transdermal daily  magnesium oxide 400 milliGRAM(s) Oral three times a day with meals  melatonin 9 milliGRAM(s) Oral at bedtime  metoprolol tartrate 12.5 milliGRAM(s) Oral two times a day  multivitamin 1 Tablet(s) Oral daily  mupirocin 2% Ointment 1 Application(s) Topical two times a day  naloxegol 25 milliGRAM(s) Oral daily  pantoprazole    Tablet 40 milliGRAM(s) Oral before breakfast  petrolatum white Ointment 1 Application(s) Topical every 8 hours  polyethylene glycol 3350 17 Gram(s) Oral two times a day  povidone iodine 10% Solution 1 Application(s) Topical daily  QUEtiapine 25 milliGRAM(s) Oral at bedtime  sevelamer carbonate 800 milliGRAM(s) Oral three times a day with meals  silver nitrate Applicator 6 Application(s) Topical once  zinc sulfate 220 milliGRAM(s) Oral daily    MEDICATIONS  (PRN):  albuterol/ipratropium for Nebulization 3 milliLiter(s) Nebulizer every 6 hours PRN Shortness of Breath and/or Wheezing  dextrose Oral Gel 15 Gram(s) Oral once PRN Blood Glucose LESS THAN 70 milliGRAM(s)/deciliter  oxyCODONE    IR 20 milliGRAM(s) Oral every 6 hours PRN Severe Pain (7 - 10)  oxyCODONE    IR 15 milliGRAM(s) Oral two times a day PRN Moderate Pain (4 - 6)  oxyCODONE    IR 10 milliGRAM(s) Oral two times a day PRN Mild Pain (1 - 3)     Subjective  Patient seen and examined at bedside this AM. Girlfriend present and participated during review  Admits to sleeping well.   No pain over sacral wound.  Happy with his therapy session yesterday during which he ambulated > 300ft with rolling walker from PT GYM to his room   Encouraged use of CPAP and Incentive Spirometer in hospital and upon discharge.   Plan for DC home today.    ROS--no acute pain, no fever or chest pain   B/l leg and sacral ulcers,  LBM 6/12    Function   -Ambulated 45' x 2 trials with bariatric RW incorporating turns house hold  distances with Close Supervision stand by assist. Steppage gait pattern used  today while wearing sneakers due to weak ankle DF/foot drop.    Therapeutic Activity  -Completed lower body dressing donning pants and sneakers prior to exiting bed  -Rolling to sitting edge of bed needing supervision  -Sit to stand with RW close supervision, SPT with RW close supervision      Vital Signs Last 24 Hrs  T(C): 36.6 (13 Jun 2025 08:27), Max: 36.7 (12 Jun 2025 13:48)  T(F): 97.9 (13 Jun 2025 08:27), Max: 98.1 (12 Jun 2025 22:26)  HR: 85 (13 Jun 2025 08:27) (85 - 104)  BP: 120/79 (13 Jun 2025 08:27) (98/63 - 137/82)  RR: 16 (13 Jun 2025 08:27) (16 - 18)  SpO2: 94% (13 Jun 2025 08:27) (94% - 98%)    EXAM  Gen - Comfortable, cheerful, motivated   HEENT - NAD  Neck - Supple, No limited ROM  Pulm - Clear  Cardiovascular - RRR, S1S2, No murmurs  Abdomen - Soft, non tender, +BS  Extremities - chronic skin changes with hyperpigmentation at shin, callus on feet, responding to treatment   Ulcer on both feet examined, healthy scab noted     Neuro-  AAO X 3, Speech is normal     Motor - UE 5/5 Hip flexion 3/5, Knee 4/5 PF 4/5, DF 2/5     Sensory - Decreased to light touch bilateral lower extremities      Coordination - impaired lower extremities   Psychiatric - Mood stable, Affect WNL    Skin:  scab on b/l foot  Unstageable sacral ulcer healing    MMS--antigravity upper extremities,    improved strength      LABS:                        9.1    7.01  )-----------( 361      ( 12 Jun 2025 06:05 )             30.9     06-12    142  |  106  |  14  ----------------------------<  96  3.7   |  29  |  1.13    Ca    9.1      12 Jun 2025 06:05    TPro  7.1  /  Alb  2.2[L]  /  TBili  0.3  /  DBili  x   /  AST  9[L]  /  ALT  11  /  AlkPhos  60  06-12      Urinalysis Basic - ( 12 Jun 2025 06:05 )    Color: x / Appearance: x / SG: x / pH: x  Gluc: 96 mg/dL / Ketone: x  / Bili: x / Urobili: x   Blood: x / Protein: x / Nitrite: x   Leuk Esterase: x / RBC: x / WBC x   Sq Epi: x / Non Sq Epi: x / Bacteria: x      CAPILLARY BLOOD GLUCOSE      MEDICATIONS  (STANDING):  ammonium lactate 12% Lotion 1 Application(s) Topical two times a day  apixaban 5 milliGRAM(s) Oral two times a day  ascorbic acid 500 milliGRAM(s) Oral daily  bisacodyl 10 milliGRAM(s) Oral at bedtime  capsaicin 0.025% Cream 1 Application(s) Topical four times a day  cefTRIAXone   IVPB 1000 milliGRAM(s) IV Intermittent every 24 hours  clotrimazole 1% Cream 1 Application(s) Topical <User Schedule>  collagenase Ointment 1 Application(s) Topical two times a day  Dakins Solution - 1/4 Strength 1 Application(s) Topical two times a day  dextrose 5%. 1000 milliLiter(s) (100 mL/Hr) IV Continuous <Continuous>  dextrose 5%. 1000 milliLiter(s) (50 mL/Hr) IV Continuous <Continuous>  dextrose 50% Injectable 25 Gram(s) IV Push once  dextrose 50% Injectable 12.5 Gram(s) IV Push once  dextrose 50% Injectable 25 Gram(s) IV Push once  gabapentin 1200 milliGRAM(s) Oral every 8 hours  glucagon  Injectable 1 milliGRAM(s) IntraMuscular once  influenza   Vaccine 0.5 milliLiter(s) IntraMuscular once  lactated ringers. 500 milliLiter(s) (100 mL/Hr) IV Continuous <Continuous>  lidocaine   4% Patch 1 Patch Transdermal daily  lidocaine   4% Patch 1 Patch Transdermal daily  magnesium oxide 400 milliGRAM(s) Oral three times a day with meals  melatonin 9 milliGRAM(s) Oral at bedtime  metoprolol tartrate 12.5 milliGRAM(s) Oral two times a day  multivitamin 1 Tablet(s) Oral daily  mupirocin 2% Ointment 1 Application(s) Topical two times a day  naloxegol 25 milliGRAM(s) Oral daily  pantoprazole    Tablet 40 milliGRAM(s) Oral before breakfast  petrolatum white Ointment 1 Application(s) Topical every 8 hours  polyethylene glycol 3350 17 Gram(s) Oral two times a day  povidone iodine 10% Solution 1 Application(s) Topical daily  QUEtiapine 25 milliGRAM(s) Oral at bedtime  sevelamer carbonate 800 milliGRAM(s) Oral three times a day with meals  silver nitrate Applicator 6 Application(s) Topical once  zinc sulfate 220 milliGRAM(s) Oral daily    MEDICATIONS  (PRN):  albuterol/ipratropium for Nebulization 3 milliLiter(s) Nebulizer every 6 hours PRN Shortness of Breath and/or Wheezing  dextrose Oral Gel 15 Gram(s) Oral once PRN Blood Glucose LESS THAN 70 milliGRAM(s)/deciliter  oxyCODONE    IR 20 milliGRAM(s) Oral every 6 hours PRN Severe Pain (7 - 10)  oxyCODONE    IR 15 milliGRAM(s) Oral two times a day PRN Moderate Pain (4 - 6)  oxyCODONE    IR 10 milliGRAM(s) Oral two times a day PRN Mild Pain (1 - 3)

## 2025-06-13 NOTE — PROGRESS NOTE ADULT - ASSESSMENT
39 year old male  with PMH of morbid obesity, BEA, pAFIB (not on AC), HTN, and BL papilledema attributed to pseudotumor cerebri; who initially presented to  on 2/24 with SOB and BL LE edema. Found to have URI with progressive SOB and multifocal PNA on imaging. His hospital course was complicated by worsening respiratory distress and increased O2 demands secondary to secretions (requiring multiple intubation attempts) and eventual MICU admission. While in MICU, his O2 requirements continued despite optimal vent management. Concern noted for progressive ARDS, unable to prone given morbid obesity, and ultimately cannulated for vvECMO on 2/25 and transferred to [facility] for further management on 2/26. His hospital course at [facility] was significant for the following: diuretic and ABX management, s/p trach and PEG (placed on 3/7- PEG since removed), RCU admission, high grade fevers (secondary to panniculitis), progressively worsening sacral ulcer (likely osteomyelitis- s/p surgical debridement), BL foot wound (secondary to pressor use), neuropathy (secondary to critical illness polyneuropathy), ELLA (secondary to vancomycin toxicity and overdiuresis- since DCd- with improvement). Patient now admitted for a multidisciplinary rehab program.     #Left hip pain - Improving  -Hip xray--> no fracture  -CT hip: Again seen is a sacral decubitus wound which extends superficial as well as involving the right gluteus musculature and the posterior L5 with small amount of fluid and gas within the tract. Limited involvement on this noncontrast CT. This likely corresponds to adjacent phlegmonous changes  -MRI left hip: Incompletely characterized sacral wound with associated ill-defined collection of fluid and soft tissue gas which extends to/involve the right gluteus zurdo muscle as at CT pelvis study from one day prior. No interval change in the high STIR signal within the bilateral gluteal and thigh musculature suggestive of a myositis as compared to prior MRI study. No fracture, osseous destruction or aggressive periosteal reaction. No osteomyelitis at the left hip. Edema-like signal along the right and left greater trochanteric regions, overall increased as compared to the prior MRI study from 4/11/2025. Findings are felt to be reactive in etiology.  -Continue comprehensive rehab program - PT/OT/SLP per rehab team  -Pain management, bowel regimen per rehab     #Abnormal CT A/P  -5/2: CT A/P: Question of pneumatosis in the cecum with no other evidence of bowel ischemia. Correlation with lactate levels and clinical exam would be helpful.  -Patient has no symptoms. Abdomen is benign. Tolerating PO  -5/2: Surgery cx noted: no intervention. c/w PO. refer to bariatric surgery post-DC dr botello at [place] or Dr. Segovia at [place] [per patient request]  -5/3 GI cx noted: no GI intervention planned. c/w PO    #Stage IV Sacral Decub Ulcer  #Bilateral Buttocks Wounds  #Bilateral Foot Wounds  -5/2 CT A/P: An air and fluid containing collection in the right gluteus zurdo muscle in continuity with a subcutaneous sacral collection. No obvious skin defect to suggest a decubitus ulcer.  -5/7: MRI left hip: Incompletely characterized sacral wound with associated ill-defined collection of fluid and soft tissue gas which extends to/involve the right gluteus zurdo muscle as at CT pelvis study from one day prior. No interval change in the high STIR signal within the bilateral gluteal and thigh musculature suggestive of a myositis as compared to prior MRI study. No fracture, osseous destruction or aggressive periosteal reaction. No osteomyelitis at the left hip. Edema-like signal along the right and left greater trochanteric regions, overall increased as compared to the prior MRI study from 4/11/2025. Findings are felt to be reactive in etiology..  -Continue local wound care  -MVI, vitamin C and zinc   -Off load pressure  -Plastic consult following  -ID follow up noted and appreciated  -Per plastics - continue current management    #Fungemia 2/2 Panniculitis - resolved  -Blood culture 3/15, 3/16 with candida albicans, s/p course of antifungals   -Repeat cultures negative since 3/18    #Debility Secondary to Acute Hypoxic Respiratory Failure/ARDS 2/2 PNA  #BEA (Not on CPAP)  #Critical Illness Polyneuropathy   -s/p VV ECMO, s/p diuresis, TTE/ROBERT with RV failure, s/p treatment for pneumonia  -Repeat Echo 3/11 showed EF 66 %. RV is not well visualized, enlarged RV cavity size a/ reduced RV systolic function. No significant valvular disease.  No echocardiographic evidence of pulmonary hypertension  -s/p Trach (decannulated 3/28) and PEG (removed 4/15)   -Saturating well on RA. Continue duoneb prn  -BIPAP qhs (FiO2 40%, 18/10) via nasal mask   -Fall precaution  -Pain control per rehab team   6/10: recurrent hypoxia while orthostatic: use 2L nc to keep sao2>92%. obtain chest xr.   6/11: new RUL PNA on Chest xr. started rocephin 2/5 to cover gram neg organisms w. complex hx.   - encouraged use of cpap at night. obtain sputum culture if able. low suspicion for aspiratin. elevate HOB    #p-AFIB  #Sinus Tachycardia   #HTN  # Rapid response 6/10/25 d/t +OH with diaphoresis, tachycardia and SBP in 60's upon return to room  -Patient has intermittent tachycardia. Per patient, His HR  mostly above 110 even when he is not sick. follows with cardio OP  -5/3: EKG: NSR@94 bpm  -Eliquis   -Off norvasc  - c/w metoprolol, reduced dose to 12.5mg BID 6/10  -TSH WNL 2/25  -Sinus tachycardia likely multifactorial including pain and deconditioning. Low suspicion for PE, no SOB/hypoxia and he is already on full AC  - 6/10 ekg sinus. BB dose decreased d/t OH. s/p IVF. d/c'ed flomax.   6/11: sinus tach. asymptomatic. missed BB yest evening. on lower dose BB today. - 500cc IVF over 5 hrs.   6/12: HR improved. BP low while sleeping 98/64. repeat BP improved.   -Continue Metoprolol, reduced dose to 12.5mg BID with holding parameters  -Monitor BP   6/13: soft BP when sleeping. when awake 120/74 now. ok for am dose metoprolol. d/c on metoprolol 12.5mg BID. recommend OP BP monitoring and cardio f.up      #RIJ and Bilateral LE DVT  -Duplex RUE 3/14 showed Acute, non-occlusive deep vein thrombosis noted within the right internal jugular vein. Superficial vein thrombosis noted within the right antecubital cephalic vein  -Duplex LE 3/14 showed Acute, occlusive deep vein thromboses noted within the right and left peroneal veins at both mid calves. Age-indeterminate, occlusive deep vein thrombosis visualized within the left gastrocnemius vein at the proximal calf.  -Continue eliquis  -Repeat duplex 4/29 showed No evidence of deep venous thrombosis in either lower extremity.    #Normocytic Anemia - Likely multifactorial  -H/H stable, no evidence of active bleed   -Monitor CBC    #History of Pseudotumor Cerebri   -Outpatient follow up     #ELLA - resolved  -Baseline Cr appears to be ~0.8 range   -ELLA felt to be multifactorial in setting of cardiorenal w/ RVE, recurrent ELLA suspected due to vancomycin toxicity and diuresis   -Continue to encourage oral hydration   -Renvela   -Low phos diet  -Monitor BMP and phos level     #Type 2 Diabetes, HbA1C 4.9 (4/28)  -FS wnl. Monitor glucose on routine lab, controlled     #Urinary Retention  -Flomax 5/1 - stopped 6/10 d/t OH  -Renal u/s 5/20 unremarkable    #Mood  -Continue Seroquel    #DVT ppx - Eliquis  #GI ppx - PPI    Discussed with rehab team.

## 2025-06-13 NOTE — PROGRESS NOTE ADULT - NS ATTEND AMEND GEN_ALL_CORE FT
Seen and examined, father present     Reports that left hip pain is now controlled  Discomfort sacral wound are still present  But unable to tolerate wound vac dressing due to obscure area of wound     Labs unremarkable     Make functional progress in therapy, observed sitting independently in bed, engaged in UE exercises    Spent 54 mins, patient review, discussion of lab results, observation in therapy, pain mgt and care co ordination, liaison with other providers hospitalist,, discharge planning and update to family
Seen and examined, note revised    Patient and partner engaged during review    Wound VAC malfunction and leakage from sides of wound vac leading to frequent vac changes and interruption of therapy sessions    Completed IV antibiotics as recs by ID teams, no signs of infection     Reports very minimal pain left hip during therapy     Improved function--T/F bed to wc via [email or web address] board, previously being done via hailey lift    Clinically stable     Continue therapy   IDT function as stated     DC date revised for reasons already stated  Environmental challenge at his home noted--small sized door, his WC will not be able to go through this door, hence plan is to achieve functional level at which he could take few steps and walk through the door  Also needs to install a ramp to his house entrace  Details d/w his partner     Spent 54 mins, patient review, discussion of treatment plan for sacral wound, left hip pain, addressing request for additional therapy time and care co ordination
Seen and examined, note revised    Patient slept well  Clinically stable   Pain controlled  Wound care for sacral ulcer in progress    Marked improvement in therapy     Issues with insurance coverage as noted  Working with SW on this, applying for Emergency Medicaid    Continue therapy   Function as noted   DC date revised
Seen and examined, note revised, findings as noted    Pain at both feet around areas with skin break on sole of foot noted  Pain over sacral wound, now open to air    Improved ambulatory distance in therapy up to 15 ft with walker     Improved engagement during transfers     Continue therapy   Remove Left arm IV line   C/w DVT ppx
I have made amendments to the documentation where necessary. Additional comments: I have personally seen and examined the patient independently Medical records reviewed. I have made amendments to the documentation where necessary and adjusted the history, physical examination, and plan as documented by the NP.       Seen and examined, father present during review  Reports increased pain both feet and calves  Tolerating oral diet   Sacral pain controlled and wound healing     BP stable   Making sustained progress in therapy and happy with same  Still has steppage gait, waiting AFOs    Continue therapy   Monitor pain symptoms  C/w wound care  dc planning
I have made amendments to the documentation where necessary. Additional comments: I have personally seen and examined the patient independently Medical records reviewed. I have made amendments to the documentation where necessary and adjusted the history, physical examination, and plan as documented by the NP.     Patient and girlfriend present during review  Tolerating oral diet   Sustained functional improvement     Girlfriend happy with treatment plan, dc wound care and all other dc plan as stated  Discussed need to adhere to use of CPAP nightly and risks of not using it     Await AFO delivery for b/l foot drop  Patient has contact details of orthotist     DC home today
I have made amendments to the documentation where necessary. Additional comments: I have personally seen and examined the patient independently Medical records reviewed. I have made amendments to the documentation where necessary and adjusted the history, physical examination, and plan as documented by the NP.     Patient reports reasonable night rest, no pain at rest   Mild/mod pain with left hip movement     MRI hips findings discussed, no overt ligamentous injury or fracture.  myositis gluts and thigh and increasing edema B/L greater trochanter    Engaged in therapy today, with some improvement of function   Observed in therapy    Sacral wound vac in situ    Continue therapy   Increased dose of celebrex  DVT ppx eliquis  Continue IV abx for sacral wound infection in line with ID recs till 5/11      Spent 52 mins, patient review, discussion of image findings, care co ordination and planning
I have made amendments to the documentation where necessary. Additional comments: I have personally seen and examined the patient independently Medical records reviewed. I have made amendments to the documentation where necessary and adjusted the history, physical examination, and plan as documented by the NP.     Patient seen with partner  No interval med events    Slept well  cpap working well via nasal canula    Worked on functional transfers during therapy   IDT--still using hailey lift for transfers    Continue therapy   F/u LE duplex scan for calf tenderness   Plastic f/u      Spent 52 mins, patient review, discussion of treatment plan, liaison with other specialties, care co ordination and planning
Seen and examined  Note revised     Patient in mod to severe pain over sacral wound, hence unable to engage in therapy at this time  Labs unremarkable     IDT today, discussed current function and barriers to dc    No resp distress    Hold therapy due to pain symptom  Continue prn analgesics, oxycodone  Will recommence once pain is controlled  Await offloading shoes
Seen and examined, note revised    No acute med complaint     Happy with this functional progress  Ambulating some distance on parallel bar    sacral ulcer--wet to dry dressing     Continue therapay   DVT ppx
Seen and examined, note revised  Findings as stated    No interval med events  Slept well    Continued functional improvement  Improved bed mobility     Pain controlled    Sacral ulcer responding to wound care  No bleeding       Continue therapy  Await specialized offloading foot wear from orthotic provider  Continue dvt ppx  F/u with plastics for sacral ulcer
Seen and examined, note revised, observed in therapy and discussed treatment plan with plastics regarding vac dressing     Patient slept well  Pain controlled    Observed to be engaging well in therapy   Wound vac momentarily removed due to leakage from sides due review by plastic today     vitals normal    Continues to make progress with transfers    Continue therapy   DVT ppx        Spent 52 mins patient review, observation in therapy discussion of treatment and progress with father, care co ordination and dc planning
I have made amendments to the documentation where necessary. Additional comments: I have personally seen and examined the patient independently Medical records reviewed. I have made amendments to the documentation where necessary and adjusted the history, physical examination, and plan as documented by the NP.     Patient reports good night rest, pain controlled  Except for pain at b/l sole of feet, over areas with ulcers while wt bearing     Labs unremarkable   Sacral ulcer--on wet to dry dressing   (difficulty maintaining vac in situ, due to position of ulcer)  Making sustained functional progress, ambulating increasing distance     Continue therapy   DVT ppx   Offload shoes b/l feet  Routine wound care to sacral ulcer   F/u with plastics, hospitalist
I have made amendments to the documentation where necessary. Additional comments: I have personally seen and examined the patient independently Medical records reviewed. I have made amendments to the documentation where necessary and adjusted the history, physical examination, and plan as documented by the NP.     Patient reports left hip pain   Had CT abd/pelvis to evaluate sacral ulcer  Findings suggestion of a GI problem for which Gen surgery and GI has been consulted    Patient did minimal therapy today limited by left hip pain   Await Xray left hip    Continue therapy as tolerated  F/u GI, gen surgery consult   Continue Plastics f/u   Orthotics review to evaluate for AFO      Spent 52 mins, patient review, management of sacral ulcer, liaison with multiple specialties
Seen and examined with NP, findings as noted    Girlfriend present during review and contributed/discussed treatment plan with her     Patient has mild muscle ache, after extensive therapy session yesterday    Improved participation in therapy  Engaged in several activities including trial session on stair negotiating     Clinically stable     Continue therapy   Discussed imaging result--LE venous duplex, no DVT
I have made amendments to the documentation where necessary. Additional comments: I have personally seen and examined the patient independently Medical records reviewed. I have made amendments to the documentation where necessary and adjusted the history, physical examination, and plan as documented by the NP.     Patient seen with his girlfriend present  No interval med events   Bed mattress deflated last night and he felt some sacral pressure due to hard bed surface    LE dorsiflexion weakness still present     Making sustained improvement with therapy as noted during IDT today     Continue therapy   DVT ppx  Meds revised--metoprolol and flomax dose reduced due to interval hypotension, with exertional dyspnea, during therapy after review requiring RRT  Required short interval nc oxy treatment  Continue therapy   F/U EKG  If oxygen desaturation recurs, will repeat ECHO and get CT chest  C/w AC with apixiban and low dose metoprolol  Hospitalist and ICU team responded to RRT  No other acute intervention   Patient remained fully conscious all through review       Spent 52 mins, patient review, discussion of treatment plan, dc planning and management during RRT, update d/w family
I have made amendments to the documentation where necessary. Additional comments: I have personally seen and examined the patient independently Medical records reviewed. I have made amendments to the documentation where necessary and adjusted the history, physical examination, and plan as documented by the NP.     Reports good night rest     Urinary retention noted, incomplete emptying   Reports burning pain both feet   Requested completion of disability forms       LE weakness and weak DF b/l   F/u review by plastic and recs for vac placement to sacral wound     Continue therapy   DVT ppx  VAC placement to surgical wound   PRAFO boot to both legs nightly   Partner present and contributed during review       Spent 53 mins, patient review, discussion of treatment plan for interval med events, review of functional progress and care co ordination
I have made amendments to the documentation where necessary. Additional comments: I have personally seen and examined the patient independently Medical records reviewed. I have made amendments to the documentation where necessary and adjusted the history, physical examination, and plan as documented by the NP.     Slept well, reports feeling much better   Markedly improved ambulatory distance, sitting for prolonged period without pain    Persisting/significant DF weakness - await orthotic eval for BL AFOs     Independent WC propel in bedroom and hallway     Regular bowel and bladder function   Wound care sacral area,     Continue therapy  DVT ppx  Commence tapering of Gabapentin dose
I have made amendments to the documentation where necessary. Additional comments: I have personally seen and examined the patient independently Medical records reviewed. I have made amendments to the documentation where necessary and adjusted the history, physical examination, and plan as documented by the NP.     Seen and examined with father present     Reports persistently improved ROM  Still has pain b/l foot, but not too distressing     Labs unremarkable     Making sustained functional progress    Continue therapy  DVT ppx apixiban  Improved ROM and function   Dc celebrex   C/w gabapentin   DC planning discussed       Spent 53 mins, patient review, discussion of labs, observation in therapy and care co ordination
Seen and examined, note revised    Patient reports good night rest   Tolerating oral diet     Improved function with therapy yesterday--ambulatory distance except for discomfort due to pressure on ulcers at both feet  Had successful shower as part of OT session    Renal bladder US for urinary hesitancy--no hydronephrosis or calculi  Mild tenderness at both thigh,     Continue therapy   DVT ppx --apixiban  Therapy to explore comfortable foot wear to offload pressure from ulcers on foot  [place] application of Ammonium lactate to sole of both feet      Spent 52 mins, patient review, discussion of functional progress and imaging reports  Care co ordination
Seen and examined, note revised  Patient and partner present during review     Reports no interval med events, tolerating oral diet   Sleeping well with nightly NC bipap    Making progress with ambulatory distance but limited by pain at sole of foot    Sacral wound, on wet to dry dressing     Labs unremarkable   LE venous duplex no DVT     Continue therapy   DVT ppx/Afib c/w apixiban  Orthotics consult for offloading shoes      Spent 53 mins, patient review, discussion of lab results, LE duplex scan, liaison with other providers and care co ordination
I have made amendments to the documentation where necessary. Additional comments: I have personally seen and examined the patient independently Medical records reviewed. I have made amendments to the documentation where necessary and adjusted the history, physical examination, and plan as documented by the NP.     Patient reports good night rest. has a new bed, and happy with this  BP stable tolerating lower dose of metoprolol, Heart rythm regular, and urethral void normal on low dose flomax    CXR as part of work up post hypotension and oxy desaturation yesterday, showed Rt sided pneumonia  Commence on Antibiotic by hospitalist     Continue therapy   DVT ppx/Afib--apixiban  SW following for DC planning and arranging wound care teaching with family   CXR with noted RUL infiltrate - start Ceftriaxone IV - transition to oral ABX on DC    Spent 52 mins, patient review, discussion of treatments, including pneumonia mgt and post dc planning
Seen and examined, note revised    Patient reports insomnia, on low dose melatonin     Reports reduced intensity of left hip pain     Able to engage in therapy today    Tolerating antibiotic treatment   Wound vac working well     Continue therapy   DVT ppx  Pain mgt   Monitor and report any bleeding   Will dc celebrex after few days   Increase melatonin dose
Seen and examined, note revised  Findings as noted    Patient reports no acute med symptoms   Girlfriend present and participated during review    Has successful body shower today, part of OT session   Pain on both feet with pressure, when standing   Tolerating oral diet     Reasonably good engagement during activity today   Committed towards achieving therapy goals and home dc       Spent 53 mins, patient review, discussion of treatment plan with patient and partner, discussion during IDT, collaboration with other providers

## 2025-06-13 NOTE — PROGRESS NOTE ADULT - ASSESSMENT
39 year old male with PMH of morbid obesity, BEA on CPAP, pAFIB (not on AC prior to admission), HTN, and BL papilledema attributed to pseudotumor cerebri; who initially presented to  on 2/24 with SOB and BL LE edema. Found to have URI with progressive SOB and multifocal PNA on imaging. His hospital course was complicated by worsening respiratory distress and increased 02 demands secondary to secretions (requiring multiple intubation attempts) and eventual MICU admission. While in MICU, he 02 requirements continued despite optimal vent management. Concern noted for progressive ARDS, unable to prone given morbid obesity, and ultimately cannulated for vvECMO on 2/25 and transferred to [facility] for further management on 2/26.  His hospital course at [facility] was significant for the following: diuretic and ABX management, s/p trache and PEG (placed on 3/7- PEG since removed), RCU admission, high grade fevers (secondary to panniculitis), progressively worsening sacral ulcer (likely osteomyelitis- s/p surgical debridement), BL foot wound (secondary to pressor use), neuropathy (secondary to critical illness polyneuropathy), ELLA (secondary to vancomycin toxicity and overdiuresis- since DCd- with improvement). Patient now admitted for a multidisciplinary rehab program. 04-25-25 @ 15:19    * Continued and remarkable functional improvement, reports pain b/l feet and calves attributes it to effect of increased ambulatory distance - continue Gabapentin  * Persisting/significant DF weakness - await orthotic eval for BL AFOs    * SW following for DC planning and arranging wound care teaching with family   * Continue and complete oral Abx for Rt pneumonia  * DC home 6/13 today    #Acute on Chronic Hypercapnic Respiratory Failure due to Morbid Obesity/OHS in setting of RTD  #Critical Illness Myopathy  #Morbid Obesity  #OHS/BEA on Bipap  - Gait Instability, ADL impairments and Functional impairments: start Comprehensive Rehab Program of PT/OT  - 3 hours a day, 5 days a week   - PRN: Nebulizer QID   - Acute on Chronic Hypercapnic Respiratory Failure due to patient morbid obesity (BMI of 56.6) which is causing restriction of the thoracic cage not allowing for proper gas exchange.  The patient is having restrictive disease secondary to OHS as indicated by PFTs. Patient is currently hospitalized with diagnosis of acute on chronic hypercapnic respiratory failure secondary to thoracic restrictive disease related/consequent to OHS and/or morbid obesity. Patient demonstrates shortness of breath and accessory muscle use at rest. Despite the nightly use of BIPAP16/5 the patient remained hypercapnic with a PCO2 of 67.  The patient requires volume augmented ventilation with targeted tidal volume not found on a standard BiPAP for home use. The patient would benefit from NIV, otherwise would be at risk for further deterioration, readmissions and possible patient harm. Patient will need NIV for home.   - CXR with noted RUL infiltrate - dc on oral Abx     # Persisting/significant DF weakness AFOs  - Orthotic eval for BL solid AFOs   - This patient has a diagnosis of BL foot drop. Patient is ambulatory. Patient requires a custom molded hinged AFO to preserve ankle motion while stabilizing talar and subtalar joints. Device will give a more natural gait without destabilizing the knee and hip. Device requires a low density lining to protect prominent roderick areas of effected skin. Patient will need the device for a term of greater than 9 months. No reasonable prefabricated orthosis exists.       #Sacral Osteomyelitis  - Zosyn TID - completed on 5/12/25    #Paroxysmal AFIB  - Eliquis 5mg BID   --low dose metoprolol 12.5mg bid (dose reduced 6/10 after RRT for hypotension)     #Calf Tenderness/pain b/l feet  - BL LE doppler negative 5/21   --Apply offloading boots when OOB      #L hip pain - resolved  - no acute findings on XR or CT   - L hip MRI (5/7) no significant findings     #HTN  - Amlodipine 10mg dialy     #Sleep/Mood  - Melatonin 9mg at HS   - Quetiapine 25mg at HS     #Skin  Wound Recs per Plastic Surgery (5/16):  Sacral Pressure Injury: BID- Cleanse with NS, remove excess fluid, apply santyl ointment to sloughing tissues, pack wound with 1-inch dry plain packing, cover with 4 inch gauze and abd combine  - R buttock: BID- Cleanse with NS, overlay calcium alginate, ABD combine dressing  Additional wound care instructions:  -->Right anterior thigh to groin isolated wound: Clean wound and periwound skin with NS. Pat dry. Apply liquid barrier film to periwound skin, allow to dry. Cover with silicone foam with border. Change daily or PRN if soiled   --> Bilateral abdominal pannus, bilateral groin: Cleanse with luke-warm soap and water, dry well. Apply clotrimazole  --> Bilateral feet: Apply betadine  to bilateral foot wounds followed by 4x4 gauze, ABD pad, and matilde daily per podiatry.  --> Continue to offload pressure; bariatric low airloss support surface, turn and position per protocol with use of fluidized positioning devices, continue incontinence and moisture care per protocol and use of single breathable incontinence pads, continue to offload heels with fluidized positioning devices. Continue use of bariatric seat cushion, limit sit time- no more than 2 consecutive hours at any given time.  - Pressure injury/Skin: OOB to Chair, PT/OT    - Ammonium lactate to BL LEs     #Neuropathy/sacral ulcer  - Gabapentin 1200mg TID --aim to taper to lower dose    #Pain Mgmt   - Capsaicin cream to BL feet QID - Avoid use on damaged, broken, or irritated skin. Avoid occlusive dressing or heat   - Lidocaine patch to BL thighs   - PRN:; Oxycodone and Tylenol 650mg QID   -celebrex 100mg bid for sacral pain   - neuropathic pain b/l feet c/w   gabapentin    #Morbid obesity  - Most recent weight 394lbs (5/7)     #GI/Bowel Mgmt   - Pantoprazole  - Bisacodyl 10mg at HS   - Miralax   - Naloxegal 25mg daily     #/Bladder Mgmt   #ELLA  - USKB 5/20 negative   - Renvela 800mg TID    #FEN --Morbid obesity (BMI > 40).  - Diet - Regular + Thins  [CCHO]    - MVI     # Health Management:   Environmental challenge affecting dc plan--narrow door and need for ramp to house entrance  However, patient making goal directed effort towards therapy goals     #Precautions / PROPHYLAXIS:   - Falls  - ortho: Weight bearing status: WBAT in surgical shoe   - Lungs: Aspiration, Incentive Spirometer   - DVT: apixiban  ----------------------------------------------      Function as at 6/10  Ambulating 75 ft  with RW CS  Barrier--ulcers on feet, limiting wt bearing,  environmental barrier at home (stairs), severe obesity  Est dc 6/13,       ----------------------------------------------  Dr. CANTU's Liaison with Family/Providers:  6/9- Met Patient's father during his visit to the unit during, he expressed happiness with patient's functional progress  We discussed dc planning including plans to address environmental barriers at home  Reports that there is a back entrance through which he could enter the house with limited number of stairs    6/10--Girlfriend present during review, confirmed getting teaching on wound care  Patient confirmed that family made arrangements for ramp installation at home  ----------------------------------------------  OUTPATIENT/FOLLOW UP:    Your Primary Care Provider,   Phone: (   )    -  Fax: (   )    -  Follow Up Time: 1 week    [facility],   [address] [place] [address]  Phone: [phone]  Fax: (   )    -  Follow Up Time:    Dr. Waterhouse  Podiatry  [phone]  Within 1 week

## 2025-06-13 NOTE — PROGRESS NOTE ADULT - NS ATTEND OPT1 GEN_ALL_CORE
I independently performed the documented:

## 2025-06-13 NOTE — PROGRESS NOTE ADULT - TIME BILLING
Time spent includes direct patient care  (interview and examination of patient), discussion with other providers, support staff and/or patient's family members, review of medical records, ordering diagnostic tests and analyzing results, and documentation.
Time spent includes direct patient care (interview and examination of patient), discussion with other providers, support staff and/or patient's family members, review of medical records, ordering diagnostic tests and analyzing results, and documentation
Time spent includes direct patient care  (interview and examination of patient), discussion with other providers, support staff and/or patient's family members, review of medical records, ordering diagnostic tests and analyzing results, and documentation.
Time spent for extensive review of the physical chart, electronic medical record, and documentation to obtain collateral information including but not limited to:  [x] Inpatient records (ED, H&P, primary team, and consultants if applicable, care coordination)  [x] Inpatient values/results (biomarkers, immunoassays, imaging, and microbiology results)  [x] Current or proposed treatment plans  [x] Pharmacotherapy review  [x] Discussion and coordinating care with primary team and interdisciplinary staff and floor staff  [x] Discussion including counseling/ education with the patient, surrogate decision maker, or family.
Time spent includes direct patient care (interview and examination of patient), discussion with other providers, support staff and/or patient's family members, review of medical records, ordering diagnostic tests and analyzing results, and documentation
Time spent includes direct patient care  (interview and examination of patient), discussion with other providers, support staff and/or patient's family members, review of medical records, ordering diagnostic tests and analyzing results, and documentation.
Time spent includes direct patient care  (interview and examination of patient), discussion with other providers, support staff and/or patient's family members, review of medical records, ordering diagnostic tests and analyzing results, and documentation.
Time spent includes direct patient care (interview and examination of patient), discussion with other providers, support staff and/or patient's family members, review of medical records, ordering diagnostic tests and analyzing results, and documentation
This includes reviewing results/imaging and discussions with specialists, nursing, case management/social work. Further tests, medications, and procedures have been ordered as indicated. Results and the plan of care were communicated to the patient and/or their family member. Supporting documentation was completed and added to the patient's chart.
Time spent for extensive review of the physical chart, electronic medical record, and documentation to obtain collateral information including but not limited to:  [x] Inpatient records (ED, H&P, primary team, and consultants if applicable, care coordination)  [x] Inpatient values/results (biomarkers, immunoassays, imaging, and microbiology results)  [x] Current or proposed treatment plans  [x] Pharmacotherapy review  [x] Discussion and coordinating care with primary team and interdisciplinary staff and floor staff  [x] Discussion including counseling/ education with the patient, surrogate decision maker, or family.
Time spent includes direct patient care (interview and examination of patient), discussion with other providers, support staff and/or patient's family members, review of medical records, ordering diagnostic tests and analyzing results, and documentation
Time spent includes direct patient care  (interview and examination of patient), discussion with other providers, support staff and/or patient's family members, review of medical records, ordering diagnostic tests and analyzing results, and documentation.
Time spent includes direct patient care  (interview and examination of patient), discussion with other providers, support staff and/or patient's family members, review of medical records, ordering diagnostic tests and analyzing results, and documentation.
Time spent includes direct patient care (interview and examination of patient), discussion with other providers, support staff and/or patient's family members, review of medical records, ordering diagnostic tests and analyzing results, and documentation
medical complexity
This includes reviewing results/imaging and discussions with specialists, nursing, case management/social work. Further tests, medications, and procedures have been ordered as indicated. Results and the plan of care were communicated to the patient and/or their family member. Supporting documentation was completed and added to the patient's chart.
This includes reviewing results/imaging and discussions with specialists, nursing, case management/social work. Further tests, medications, and procedures have been ordered as indicated. Results and the plan of care were communicated to the patient and/or their family member. Supporting documentation was completed and added to the patient's chart.
Time spent for extensive review of the physical chart, electronic medical record, and documentation to obtain collateral information including but not limited to:  [x] Inpatient records (ED, H&P, primary team, and consultants if applicable, care coordination)  [x] Inpatient values/results (biomarkers, immunoassays, imaging, and microbiology results)  [x] Current or proposed treatment plans  [x] Pharmacotherapy review  [x] Discussion and coordinating care with primary team and interdisciplinary staff and floor staff  [x] Discussion including counseling/ education with the patient, surrogate decision maker, or family.
Time spent includes direct patient care  (interview and examination of patient), discussion with other providers, support staff and/or patient's family members, review of medical records, ordering diagnostic tests and analyzing results, and documentation.
Time spent includes direct patient care  (interview and examination of patient), discussion with other providers, support staff and/or patient's family members, review of medical records, ordering diagnostic tests and analyzing results, and documentation.
Time spent includes direct patient care (interview and examination of patient), discussion with other providers, support staff and/or patient's family members, review of medical records, ordering diagnostic tests and analyzing results, and documentation
Time spent for extensive review of the physical chart, electronic medical record, and documentation to obtain collateral information including but not limited to:  [x] Inpatient records (ED, H&P, primary team, and consultants if applicable, care coordination)  [x] Inpatient values/results (biomarkers, immunoassays, imaging, and microbiology results)  [x] Current or proposed treatment plans  [x] Pharmacotherapy review  [x] Discussion and coordinating care with primary team and interdisciplinary staff and floor staff  [x] Discussion including counseling/ education with the patient, surrogate decision maker, or family.
Time spent includes direct patient care (interview and examination of patient), discussion with other providers, support staff and/or patient's family members, review of medical records, ordering diagnostic tests and analyzing results, and documentation
Time spent includes direct patient care (interview and examination of patient), discussion with other providers, support staff and/or patient's family members, review of medical records, ordering diagnostic tests and analyzing results, and documentation

## 2025-06-13 NOTE — PROGRESS NOTE ADULT - SUBJECTIVE AND OBJECTIVE BOX
Subjective  Patient seen and examined at bedside this AM. with girlfriend present  Plan for DC home today   Discussed need to adhere nightly CPAP.   Happy with his functional progress, reports walking > 300ft yesterday with walker    ROS--no acute pain, no fever or chest pain   B/l leg and sacral ulcers,  LBM 6/12    Function   -Ambulated 45' x 2 trials with bariatric RW incorporating turns house hold  distances with Close Supervision stand by assist. Steppage gait pattern used  today while wearing sneakers due to weak ankle DF/foot drop.    Therapeutic Activity  -Completed lower body dressing donning pants and sneakers prior to exiting bed  -Rolling to sitting edge of bed needing supervision  -Sit to stand with RW close supervision, SPT with RW close supervision     Vital Signs Last 24 Hrs  T(C): 36.6 (13 Jun 2025 08:27), Max: 36.7 (12 Jun 2025 13:48)  T(F): 97.9 (13 Jun 2025 08:27), Max: 98.1 (12 Jun 2025 22:26)  HR: 85 (13 Jun 2025 08:27) (85 - 104)  BP: 120/79 (13 Jun 2025 08:27) (98/63 - 137/82)  RR: 16 (13 Jun 2025 08:27) (16 - 18)  SpO2: 94% (13 Jun 2025 08:27) (94% - 98%)  O2 Parameters below as of 13 Jun 2025 08:27  Patient On (Oxygen Delivery Method): room air      EXAM  Gen - Comfortable, cheerful,   HEENT - NAD  Neck - Supple, No limited ROM  Pulm - Clear  Cardiovascular - RRR, S1S2, No murmurs  Abdomen - Soft, non tender, +BS  Extremities - chronic skin changes with hyperpigmentation at shin, callus on feet, responding to treatment   Ulcer on both feet examined, healthy scab noted     Neuro-  AAO X 3, Speech is normal     Motor - UE 5/5 Hip flexion 3/5, Knee 4/5 PF 4/5, DF 2/5     Sensory - Decreased to light touch bilateral lower extremities      Coordination - impaired lower extremities   Psychiatric - Mood stable, Affect WNL  B/l dorsal foot scab in situ     MMS--antigravity upper extremities,    improved strength      LABS:                        9.1    7.01  )-----------( 361      ( 12 Jun 2025 06:05 )             30.9     06-12    142  |  106  |  14  ----------------------------<  96  3.7   |  29  |  1.13    Ca    9.1      12 Jun 2025 06:05    TPro  7.1  /  Alb  2.2[L]  /  TBili  0.3  /  DBili  x   /  AST  9[L]  /  ALT  11  /  AlkPhos  60  06-12      Urinalysis Basic - ( 12 Jun 2025 06:05 )  Color: x / Appearance: x / SG: x / pH: x  Gluc: 96 mg/dL / Ketone: x  / Bili: x / Urobili: x   Blood: x / Protein: x / Nitrite: x   Leuk Esterase: x / RBC: x / WBC x   Sq Epi: x / Non Sq Epi: x / Bacteria: x      CAPILLARY BLOOD GLUCOSE    MEDICATIONS  (STANDING):  ammonium lactate 12% Lotion 1 Application(s) Topical two times a day  apixaban 5 milliGRAM(s) Oral two times a day  ascorbic acid 500 milliGRAM(s) Oral daily  bisacodyl 10 milliGRAM(s) Oral at bedtime  capsaicin 0.025% Cream 1 Application(s) Topical four times a day  cefTRIAXone   IVPB 1000 milliGRAM(s) IV Intermittent every 24 hours  clotrimazole 1% Cream 1 Application(s) Topical <User Schedule>  collagenase Ointment 1 Application(s) Topical two times a day  Dakins Solution - 1/4 Strength 1 Application(s) Topical two times a day  dextrose 5%. 1000 milliLiter(s) (50 mL/Hr) IV Continuous <Continuous>  dextrose 5%. 1000 milliLiter(s) (100 mL/Hr) IV Continuous <Continuous>  dextrose 50% Injectable 25 Gram(s) IV Push once  dextrose 50% Injectable 12.5 Gram(s) IV Push once  dextrose 50% Injectable 25 Gram(s) IV Push once  gabapentin 1200 milliGRAM(s) Oral every 8 hours  glucagon  Injectable 1 milliGRAM(s) IntraMuscular once  influenza   Vaccine 0.5 milliLiter(s) IntraMuscular once  lactated ringers. 500 milliLiter(s) (100 mL/Hr) IV Continuous <Continuous>  lidocaine   4% Patch 1 Patch Transdermal daily  lidocaine   4% Patch 1 Patch Transdermal daily  magnesium oxide 400 milliGRAM(s) Oral three times a day with meals  melatonin 9 milliGRAM(s) Oral at bedtime  metoprolol tartrate 12.5 milliGRAM(s) Oral two times a day  multivitamin 1 Tablet(s) Oral daily  mupirocin 2% Ointment 1 Application(s) Topical two times a day  naloxegol 25 milliGRAM(s) Oral daily  pantoprazole    Tablet 40 milliGRAM(s) Oral before breakfast  petrolatum white Ointment 1 Application(s) Topical every 8 hours  polyethylene glycol 3350 17 Gram(s) Oral two times a day  povidone iodine 10% Solution 1 Application(s) Topical daily  QUEtiapine 25 milliGRAM(s) Oral at bedtime  sevelamer carbonate 800 milliGRAM(s) Oral three times a day with meals  silver nitrate Applicator 6 Application(s) Topical once  zinc sulfate 220 milliGRAM(s) Oral daily    MEDICATIONS  (PRN):  albuterol/ipratropium for Nebulization 3 milliLiter(s) Nebulizer every 6 hours PRN Shortness of Breath and/or Wheezing  dextrose Oral Gel 15 Gram(s) Oral once PRN Blood Glucose LESS THAN 70 milliGRAM(s)/deciliter  oxyCODONE    IR 20 milliGRAM(s) Oral every 6 hours PRN Severe Pain (7 - 10)  oxyCODONE    IR 15 milliGRAM(s) Oral two times a day PRN Moderate Pain (4 - 6)  oxyCODONE    IR 10 milliGRAM(s) Oral two times a day PRN Mild Pain (1 - 3)

## 2025-06-13 NOTE — PROGRESS NOTE ADULT - PROVIDER SPECIALTY LIST ADULT
Gastroenterology
Hospitalist
Physiatry
Plastic Surgery
Rehab Medicine
Hospitalist
Neuro Rehabilitation
Physiatry
Plastic Surgery
Rehab Medicine
Hospitalist
Infectious Disease
Neuro Rehabilitation
Plastic Surgery
Rehab Medicine
Hospitalist
Rehab Medicine
Hospitalist
Rehab Medicine
Hospitalist
Rehab Medicine
Hospitalist

## 2025-06-13 NOTE — PROGRESS NOTE ADULT - SUBJECTIVE AND OBJECTIVE BOX
soft BP when sleeping. when awake 120/74 now.  no complaints.   d/c today CC: Patient is a 39y old  Male who presents with a chief complaint of Debility secondary to acute hypoxic respiratory failure (13 Jun 2025 11:32)      Interval History:  Patient seen and examined at bedside.  soft BP while sleeping. asymptomtic. repeat now 120/74.   denies OH sxs with therapy.   going home today.     ALLERGIES:  No Known Allergies    MEDICATIONS  (STANDING):  ammonium lactate 12% Lotion 1 Application(s) Topical two times a day  apixaban 5 milliGRAM(s) Oral two times a day  ascorbic acid 500 milliGRAM(s) Oral daily  bisacodyl 10 milliGRAM(s) Oral at bedtime  capsaicin 0.025% Cream 1 Application(s) Topical four times a day  cefTRIAXone   IVPB 1000 milliGRAM(s) IV Intermittent every 24 hours  clotrimazole 1% Cream 1 Application(s) Topical <User Schedule>  collagenase Ointment 1 Application(s) Topical two times a day  Dakins Solution - 1/4 Strength 1 Application(s) Topical two times a day  dextrose 5%. 1000 milliLiter(s) (100 mL/Hr) IV Continuous <Continuous>  dextrose 5%. 1000 milliLiter(s) (50 mL/Hr) IV Continuous <Continuous>  dextrose 50% Injectable 25 Gram(s) IV Push once  dextrose 50% Injectable 12.5 Gram(s) IV Push once  dextrose 50% Injectable 25 Gram(s) IV Push once  gabapentin 1200 milliGRAM(s) Oral every 8 hours  glucagon  Injectable 1 milliGRAM(s) IntraMuscular once  influenza   Vaccine 0.5 milliLiter(s) IntraMuscular once  lactated ringers. 500 milliLiter(s) (100 mL/Hr) IV Continuous <Continuous>  lidocaine   4% Patch 1 Patch Transdermal daily  lidocaine   4% Patch 1 Patch Transdermal daily  magnesium oxide 400 milliGRAM(s) Oral three times a day with meals  melatonin 9 milliGRAM(s) Oral at bedtime  metoprolol tartrate 12.5 milliGRAM(s) Oral two times a day  multivitamin 1 Tablet(s) Oral daily  mupirocin 2% Ointment 1 Application(s) Topical two times a day  naloxegol 25 milliGRAM(s) Oral daily  pantoprazole    Tablet 40 milliGRAM(s) Oral before breakfast  petrolatum white Ointment 1 Application(s) Topical every 8 hours  polyethylene glycol 3350 17 Gram(s) Oral two times a day  povidone iodine 10% Solution 1 Application(s) Topical daily  QUEtiapine 25 milliGRAM(s) Oral at bedtime  sevelamer carbonate 800 milliGRAM(s) Oral three times a day with meals  silver nitrate Applicator 6 Application(s) Topical once  zinc sulfate 220 milliGRAM(s) Oral daily    MEDICATIONS  (PRN):  albuterol/ipratropium for Nebulization 3 milliLiter(s) Nebulizer every 6 hours PRN Shortness of Breath and/or Wheezing  dextrose Oral Gel 15 Gram(s) Oral once PRN Blood Glucose LESS THAN 70 milliGRAM(s)/deciliter  oxyCODONE    IR 20 milliGRAM(s) Oral every 6 hours PRN Severe Pain (7 - 10)  oxyCODONE    IR 15 milliGRAM(s) Oral two times a day PRN Moderate Pain (4 - 6)  oxyCODONE    IR 10 milliGRAM(s) Oral two times a day PRN Mild Pain (1 - 3)    Vital Signs Last 24 Hrs  T(F): 97.9 (13 Jun 2025 08:27), Max: 98.1 (12 Jun 2025 22:26)  HR: 85 (13 Jun 2025 08:27) (85 - 104)  BP: 120/79 (13 Jun 2025 08:27) (98/63 - 120/79)  RR: 16 (13 Jun 2025 08:27) (16 - 18)  SpO2: 94% (13 Jun 2025 08:27) (94% - 95%)  I&O's Summary    12 Jun 2025 07:01  -  13 Jun 2025 07:00  --------------------------------------------------------  IN: 0 mL / OUT: 600 mL / NET: -600 mL      PHYSICAL EXAM:  GENERAL: NAD. in chair  HEENT: NCAT  CHEST/LUNG: grossly Clear to auscultation b/l. on room air. no resp distress. normal effort.   HEART: Regular rate and rhythm  ABDOMEN: Soft, Nontender, Nondistended; Bowel sounds present obese  PSYCH: Appropriate affect  NEURO: Alert & Oriented x 3    LABS:                        9.1    7.01  )-----------( 361      ( 12 Jun 2025 06:05 )             30.9       06-12    142  |  106  |  14  ----------------------------<  96  3.7   |  29  |  1.13    Ca    9.1      12 Jun 2025 06:05    TPro  7.1  /  Alb  2.2  /  TBili  0.3  /  DBili  x   /  AST  9   /  ALT  11  /  AlkPhos  60  06-12         Urinalysis Basic - ( 12 Jun 2025 06:05 )    Color: x / Appearance: x / SG: x / pH: x  Gluc: 96 mg/dL / Ketone: x  / Bili: x / Urobili: x   Blood: x / Protein: x / Nitrite: x   Leuk Esterase: x / RBC: x / WBC x   Sq Epi: x / Non Sq Epi: x / Bacteria: x            Care Discussed with Consultants/Other Providers: Yes

## 2025-06-13 NOTE — PROGRESS NOTE ADULT - NUTRITIONAL ASSESSMENT
This patient has been assessed with a concern for Malnutrition and has been determined to have a diagnosis/diagnoses of Morbid obesity (BMI > 40).    This patient is being managed with:   Diet Regular-  Consistent Carbohydrate {Evening Snack} (CSTCHOSN)  Low Sodium  No Concentrated Phosphorus  Efrain(7 Gm Arginine/7 Gm Glut/1.2 Gm HMB     Qty per Day:  BID  Entered: May 30 2025  2:42PM  
This patient has been assessed with a concern for Malnutrition and has been determined to have a diagnosis/diagnoses of Morbid obesity (BMI > 40).    This patient is being managed with:   Diet Regular-  Consistent Carbohydrate {Evening Snack} (CSTCHOSN)  Low Sodium  No Concentrated Phosphorus  Efrain(7 Gm Arginine/7 Gm Glut/1.2 Gm HMB     Qty per Day:  BID  Supplement Feeding Modality:  Oral  Ensure Max Cans or Servings Per Day:  1       Frequency:  Two Times a day  Entered: Apr 27 2025  1:24PM  
This patient has been assessed with a concern for Malnutrition and has been determined to have a diagnosis/diagnoses of Morbid obesity (BMI > 40).    This patient is being managed with:   Diet Regular-  Consistent Carbohydrate {Evening Snack} (CSTCHOSN)  Low Sodium  No Concentrated Phosphorus  Efrain(7 Gm Arginine/7 Gm Glut/1.2 Gm HMB     Qty per Day:  BID  Supplement Feeding Modality:  Oral  Ensure Max Cans or Servings Per Day:  1       Frequency:  Two Times a day  Entered: Apr 27 2025  1:24PM  
This patient has been assessed with a concern for Malnutrition and has been determined to have a diagnosis/diagnoses of Morbid obesity (BMI > 40).    This patient is being managed with:   Diet Regular-  Consistent Carbohydrate {Evening Snack} (CSTCHOSN)  Low Sodium  No Concentrated Phosphorus  Efrain(7 Gm Arginine/7 Gm Glut/1.2 Gm HMB     Qty per Day:  BID  Supplement Feeding Modality:  Oral  Ensure Max Cans or Servings Per Day:  1       Frequency:  Two Times a day  Entered: May  3 2025 12:02AM  
This patient has been assessed with a concern for Malnutrition and has been determined to have a diagnosis/diagnoses of Morbid obesity (BMI > 40).    This patient is being managed with:   Diet Regular-  Consistent Carbohydrate {Evening Snack} (CSTCHOSN)  Low Sodium  No Concentrated Phosphorus  Efrian(7 Gm Arginine/7 Gm Glut/1.2 Gm HMB     Qty per Day:  BID  Supplement Feeding Modality:  Oral  Ensure Max Cans or Servings Per Day:  1       Frequency:  Two Times a day  Entered: May  3 2025 12:02AM  
This patient has been assessed with a concern for Malnutrition and has been determined to have a diagnosis/diagnoses of Morbid obesity (BMI > 40).    This patient is being managed with:   Diet Regular-  Consistent Carbohydrate {Evening Snack} (CSTCHOSN)  Low Sodium  No Concentrated Phosphorus  Efrain(7 Gm Arginine/7 Gm Glut/1.2 Gm HMB     Qty per Day:  BID  Entered: May 30 2025  2:42PM  
This patient has been assessed with a concern for Malnutrition and has been determined to have a diagnosis/diagnoses of Morbid obesity (BMI > 40).    This patient is being managed with:   Diet Regular-  Consistent Carbohydrate {Evening Snack} (CSTCHOSN)  Low Sodium  No Concentrated Phosphorus  Efrain(7 Gm Arginine/7 Gm Glut/1.2 Gm HMB     Qty per Day:  BID  Supplement Feeding Modality:  Oral  Ensure Max Cans or Servings Per Day:  1       Frequency:  Two Times a day  Entered: May  3 2025 12:02AM  
This patient has been assessed with a concern for Malnutrition and has been determined to have a diagnosis/diagnoses of Morbid obesity (BMI > 40).    This patient is being managed with:   Diet Regular-  Consistent Carbohydrate {Evening Snack} (CSTCHOSN)  Low Sodium  No Concentrated Phosphorus  Efrani(7 Gm Arginine/7 Gm Glut/1.2 Gm HMB     Qty per Day:  BID  Supplement Feeding Modality:  Oral  Ensure Max Cans or Servings Per Day:  1       Frequency:  Daily  Entered: May 27 2025 11:34AM  
This patient has been assessed with a concern for Malnutrition and has been determined to have a diagnosis/diagnoses of Morbid obesity (BMI > 40).    This patient is being managed with:   Diet Regular-  Consistent Carbohydrate {Evening Snack} (CSTCHOSN)  Low Sodium  No Concentrated Phosphorus  Efrain(7 Gm Arginine/7 Gm Glut/1.2 Gm HMB     Qty per Day:  BID  Entered: May 30 2025  2:42PM  
This patient has been assessed with a concern for Malnutrition and has been determined to have a diagnosis/diagnoses of Morbid obesity (BMI > 40).    This patient is being managed with:   Diet Regular-  Consistent Carbohydrate {Evening Snack} (CSTCHOSN)  Low Sodium  No Concentrated Phosphorus  Efrain(7 Gm Arginine/7 Gm Glut/1.2 Gm HMB     Qty per Day:  BID  Entered: May 30 2025  2:42PM  
This patient has been assessed with a concern for Malnutrition and has been determined to have a diagnosis/diagnoses of Morbid obesity (BMI > 40).    This patient is being managed with:   Diet Regular-  Consistent Carbohydrate {Evening Snack} (CSTCHOSN)  Low Sodium  No Concentrated Phosphorus  Efrain(7 Gm Arginine/7 Gm Glut/1.2 Gm HMB     Qty per Day:  BID  Supplement Feeding Modality:  Oral  Ensure Max Cans or Servings Per Day:  1       Frequency:  Two Times a day  Entered: May  3 2025 12:02AM  
This patient has been assessed with a concern for Malnutrition and has been determined to have a diagnosis/diagnoses of Morbid obesity (BMI > 40).    This patient is being managed with:   Diet Regular-  Consistent Carbohydrate {Evening Snack} (CSTCHOSN)  Low Sodium  No Concentrated Phosphorus  Efrain(7 Gm Arginine/7 Gm Glut/1.2 Gm HMB     Qty per Day:  BID  Supplement Feeding Modality:  Oral  Ensure Max Cans or Servings Per Day:  1       Frequency:  Daily  Entered: May 27 2025 11:34AM  
This patient has been assessed with a concern for Malnutrition and has been determined to have a diagnosis/diagnoses of Morbid obesity (BMI > 40).    This patient is being managed with:   Diet Regular-  Consistent Carbohydrate {Evening Snack} (CSTCHOSN)  Low Sodium  No Concentrated Phosphorus  Efrain(7 Gm Arginine/7 Gm Glut/1.2 Gm HMB     Qty per Day:  BID  Supplement Feeding Modality:  Oral  Ensure Max Cans or Servings Per Day:  1       Frequency:  Daily  Entered: May 27 2025 11:34AM  
This patient has been assessed with a concern for Malnutrition and has been determined to have a diagnosis/diagnoses of Morbid obesity (BMI > 40).    This patient is being managed with:   Diet Regular-  Consistent Carbohydrate {Evening Snack} (CSTCHOSN)  Low Sodium  No Concentrated Phosphorus  Efrain(7 Gm Arginine/7 Gm Glut/1.2 Gm HMB     Qty per Day:  BID  Supplement Feeding Modality:  Oral  Ensure Max Cans or Servings Per Day:  1       Frequency:  Two Times a day  Entered: Apr 27 2025  1:24PM  
This patient has been assessed with a concern for Malnutrition and has been determined to have a diagnosis/diagnoses of Morbid obesity (BMI > 40).    This patient is being managed with:   Diet Regular-  Consistent Carbohydrate {Evening Snack} (CSTCHOSN)  Low Sodium  No Concentrated Phosphorus  Efrain(7 Gm Arginine/7 Gm Glut/1.2 Gm HMB     Qty per Day:  BID  Supplement Feeding Modality:  Oral  Ensure Max Cans or Servings Per Day:  1       Frequency:  Two Times a day  Entered: Apr 27 2025  1:24PM  
This patient has been assessed with a concern for Malnutrition and has been determined to have a diagnosis/diagnoses of Morbid obesity (BMI > 40).    This patient is being managed with:   Diet Regular-  Consistent Carbohydrate {Evening Snack} (CSTCHOSN)  Low Sodium  No Concentrated Phosphorus  Efrain(7 Gm Arginine/7 Gm Glut/1.2 Gm HMB     Qty per Day:  BID  Supplement Feeding Modality:  Oral  Ensure Max Cans or Servings Per Day:  1       Frequency:  Two Times a day  Entered: May  3 2025 12:02AM  
This patient has been assessed with a concern for Malnutrition and has been determined to have a diagnosis/diagnoses of Morbid obesity (BMI > 40).    This patient is being managed with:   Diet Regular-  Consistent Carbohydrate {Evening Snack} (CSTCHOSN)  Low Sodium  No Concentrated Phosphorus  Efrain(7 Gm Arginine/7 Gm Glut/1.2 Gm HMB     Qty per Day:  BID  Entered: May 30 2025  2:42PM  
This patient has been assessed with a concern for Malnutrition and has been determined to have a diagnosis/diagnoses of Morbid obesity (BMI > 40).    This patient is being managed with:   Diet Regular-  Consistent Carbohydrate {Evening Snack} (CSTCHOSN)  Low Sodium  No Concentrated Phosphorus  Efrain(7 Gm Arginine/7 Gm Glut/1.2 Gm HMB     Qty per Day:  BID  Supplement Feeding Modality:  Oral  Ensure Max Cans or Servings Per Day:  1       Frequency:  Two Times a day  Entered: Apr 27 2025  1:24PM  
This patient has been assessed with a concern for Malnutrition and has been determined to have a diagnosis/diagnoses of Morbid obesity (BMI > 40).    This patient is being managed with:   Diet Regular-  Consistent Carbohydrate {Evening Snack} (CSTCHOSN)  Low Sodium  No Concentrated Phosphorus  Efrain(7 Gm Arginine/7 Gm Glut/1.2 Gm HMB     Qty per Day:  BID  Supplement Feeding Modality:  Oral  Ensure Max Cans or Servings Per Day:  1       Frequency:  Two Times a day  Entered: May  3 2025 12:02AM  
This patient has been assessed with a concern for Malnutrition and has been determined to have a diagnosis/diagnoses of Morbid obesity (BMI > 40).    This patient is being managed with:   Diet Regular-  Consistent Carbohydrate {Evening Snack} (CSTCHOSN)  Low Sodium  No Concentrated Phosphorus  Efrain(7 Gm Arginine/7 Gm Glut/1.2 Gm HMB     Qty per Day:  BID  Supplement Feeding Modality:  Oral  Ensure Max Cans or Servings Per Day:  1       Frequency:  Two Times a day  Entered: May  3 2025 12:02AM

## 2025-06-16 ENCOUNTER — TRANSCRIPTION ENCOUNTER (OUTPATIENT)
Age: 40
End: 2025-06-16

## 2025-06-18 RX ORDER — METOPROLOL SUCCINATE 50 MG/1
0.5 TABLET, EXTENDED RELEASE ORAL
Qty: 30 | Refills: 0
Start: 2025-06-18 | End: 2025-07-17

## 2025-06-20 NOTE — CHART NOTE - NSCHARTNOTEFT_GEN_A_CORE
NUTRITION Follow Up Note    Pt visited during breakfast rounds. Pt lying in bed in pain and reports skipping breakfast due to pain. Nursing team aware of patient discomfort at this time. Ensure Max accumulating at bedside, will reduce frequency to po daily from BID. Continue to monitor patient po intakes as they are varied as per nursing flow sheets.     SOURCE: Patient [X]  Medical Record [X]  Nursing Staff [X]  Family Member []    DIET: Diet, Regular:   Consistent Carbohydrate {Evening Snack} (CSTCHOSN)  Low Sodium  No Concentrated Phosphorus  Efrain(7 Gm Arginine/7 Gm Glut/1.2 Gm HMB     Qty per Day:  BID  Supplement Feeding Modality:  Oral  Ensure Max Cans or Servings Per Day:  1       Frequency:  Two Times a day (05-03-25 @ 00:02) [Active]          EDEMA: +3     LAST BM: 5/26    SKIN: multiple pressure injuries - see flow sheets for details     PERTINENT MEDICATIONS: MEDICATIONS  (STANDING):  ammonium lactate 12% Lotion 1 Application(s) Topical two times a day  apixaban 5 milliGRAM(s) Oral two times a day  ascorbic acid 500 milliGRAM(s) Oral daily  bisacodyl 10 milliGRAM(s) Oral at bedtime  capsaicin 0.025% Cream 1 Application(s) Topical four times a day  clotrimazole 1% Cream 1 Application(s) Topical <User Schedule>  collagenase Ointment 1 Application(s) Topical two times a day  Dakins Solution - 1/4 Strength 1 Application(s) Topical two times a day  dextrose 5%. 1000 milliLiter(s) (100 mL/Hr) IV Continuous <Continuous>  dextrose 5%. 1000 milliLiter(s) (50 mL/Hr) IV Continuous <Continuous>  dextrose 50% Injectable 25 Gram(s) IV Push once  dextrose 50% Injectable 12.5 Gram(s) IV Push once  dextrose 50% Injectable 25 Gram(s) IV Push once  DULoxetine 60 milliGRAM(s) Oral daily  gabapentin 1200 milliGRAM(s) Oral every 8 hours  glucagon  Injectable 1 milliGRAM(s) IntraMuscular once  influenza   Vaccine 0.5 milliLiter(s) IntraMuscular once  lidocaine   4% Patch 1 Patch Transdermal daily  lidocaine   4% Patch 1 Patch Transdermal daily  magnesium oxide 400 milliGRAM(s) Oral three times a day with meals  melatonin 9 milliGRAM(s) Oral at bedtime  metoprolol tartrate 50 milliGRAM(s) Oral two times a day  multivitamin 1 Tablet(s) Oral daily  mupirocin 2% Ointment 1 Application(s) Topical two times a day  naloxegol 25 milliGRAM(s) Oral daily  pantoprazole    Tablet 40 milliGRAM(s) Oral before breakfast  petrolatum white Ointment 1 Application(s) Topical every 8 hours  polyethylene glycol 3350 17 Gram(s) Oral two times a day  povidone iodine 10% Solution 1 Application(s) Topical daily  pregabalin 25 milliGRAM(s) Oral two times a day  QUEtiapine 25 milliGRAM(s) Oral at bedtime  sevelamer carbonate 800 milliGRAM(s) Oral three times a day with meals  silver nitrate Applicator 6 Application(s) Topical once  tamsulosin 0.8 milliGRAM(s) Oral at bedtime  zinc sulfate 220 milliGRAM(s) Oral daily    MEDICATIONS  (PRN):  albuterol/ipratropium for Nebulization 3 milliLiter(s) Nebulizer every 6 hours PRN Shortness of Breath and/or Wheezing  dextrose Oral Gel 15 Gram(s) Oral once PRN Blood Glucose LESS THAN 70 milliGRAM(s)/deciliter  oxyCODONE    IR 20 milliGRAM(s) Oral every 6 hours PRN Severe Pain (7 - 10)  oxyCODONE    IR 15 milliGRAM(s) Oral two times a day PRN Moderate Pain (4 - 6)  oxyCODONE    IR 10 milliGRAM(s) Oral two times a day PRN Mild Pain (1 - 3)      PERTINENT LABS:  05-27 Na142 mmol/L Glu 77 mg/dL K+ 3.5 mmol/L Cr  1.01 mg/dL BUN 9 mg/dL 05-22 Phos 5.8 mg/dL[H] 05-27 Alb 2.0 g/dL[L]        ESTIMATED NEEDS:   [X] No Change Since Previous Assessment    PREVIOUS NUTRITION DIAGNOSIS:  1. Overweight/obesity   2. Increased nutrient needs     NUTRITION DIAGNOSIS IS [X] Ongoing     NEW NUTRITION DIAGNOSIS: [X] Not Applicable    INTERVENTIONS:   1. Continue Current Nutrition Care Regimen at This Time.   2. Reduce frequency of oral nutrition supplement    MONITORING & EVALUATION:   1. Weights   2. PO intakes   3. Skin integrity   4. Tolerance to diet prescription   5. Labs & POCT  6. Follow up (per protocol)    Registered Dietitian/Nutritionist Remains Available.  Robby Jara RD    Contact: [phone] or via [place] TEAMS
Nutrition Follow Up Note  Source: Medical Record [X] Patient [x] Family [ ]         Diet: Regular, consistent carbohydrate (evening snack), low sodium, no concentrated phos, Efrain BID  Pt tolerating diet with variable PO intake per nursing flow sheets, although pt observed during meal rounds w/ consistently good PO intake. Ate 100% of lunch today & also has good acceptance of Efrain (provides 90 kcal, 14 g amino acids)  Denies nausea, vomiting, diarrhea, constipation.   Pt denies chewing/swallowing difficulties.     Enteral/Parenteral Nutrition: N/A    Weight: 394.6 lbs (5/7)    Pertinent Medications: MEDICATIONS  (STANDING):  ammonium lactate 12% Lotion 1 Application(s) Topical two times a day  apixaban 5 milliGRAM(s) Oral two times a day  ascorbic acid 500 milliGRAM(s) Oral daily  bisacodyl 10 milliGRAM(s) Oral at bedtime  capsaicin 0.025% Cream 1 Application(s) Topical four times a day  clotrimazole 1% Cream 1 Application(s) Topical <User Schedule>  collagenase Ointment 1 Application(s) Topical two times a day  Dakins Solution - 1/4 Strength 1 Application(s) Topical two times a day  dextrose 5%. 1000 milliLiter(s) (50 mL/Hr) IV Continuous <Continuous>  dextrose 5%. 1000 milliLiter(s) (100 mL/Hr) IV Continuous <Continuous>  dextrose 50% Injectable 25 Gram(s) IV Push once  dextrose 50% Injectable 12.5 Gram(s) IV Push once  dextrose 50% Injectable 25 Gram(s) IV Push once  gabapentin 1200 milliGRAM(s) Oral every 8 hours  glucagon  Injectable 1 milliGRAM(s) IntraMuscular once  influenza   Vaccine 0.5 milliLiter(s) IntraMuscular once  lidocaine   4% Patch 1 Patch Transdermal daily  lidocaine   4% Patch 1 Patch Transdermal daily  magnesium oxide 400 milliGRAM(s) Oral three times a day with meals  melatonin 9 milliGRAM(s) Oral at bedtime  metoprolol tartrate 25 milliGRAM(s) Oral two times a day  multivitamin 1 Tablet(s) Oral daily  mupirocin 2% Ointment 1 Application(s) Topical two times a day  naloxegol 25 milliGRAM(s) Oral daily  pantoprazole    Tablet 40 milliGRAM(s) Oral before breakfast  petrolatum white Ointment 1 Application(s) Topical every 8 hours  polyethylene glycol 3350 17 Gram(s) Oral two times a day  povidone iodine 10% Solution 1 Application(s) Topical daily  QUEtiapine 25 milliGRAM(s) Oral at bedtime  sevelamer carbonate 800 milliGRAM(s) Oral three times a day with meals  silver nitrate Applicator 6 Application(s) Topical once  tamsulosin 0.8 milliGRAM(s) Oral at bedtime  zinc sulfate 220 milliGRAM(s) Oral daily    MEDICATIONS  (PRN):  albuterol/ipratropium for Nebulization 3 milliLiter(s) Nebulizer every 6 hours PRN Shortness of Breath and/or Wheezing  dextrose Oral Gel 15 Gram(s) Oral once PRN Blood Glucose LESS THAN 70 milliGRAM(s)/deciliter  oxyCODONE    IR 20 milliGRAM(s) Oral every 6 hours PRN Severe Pain (7 - 10)  oxyCODONE    IR 15 milliGRAM(s) Oral two times a day PRN Moderate Pain (4 - 6)  oxyCODONE    IR 10 milliGRAM(s) Oral two times a day PRN Mild Pain (1 - 3)      Pertinent Labs:  06-09 Na144 mmol/L Glu 96 mg/dL K+ 4.0 mmol/L Cr  1.18 mg/dL BUN 13 mg/dL 06-05 Phos 5.8 mg/dL[H] 06-09 Alb 2.2 g/dL[L]        Skin: coccyx unstageable pressure injury, R buttock unstageable pressure injury Per nursing flowsheets     Edema: No edema per nursing flow sheets     Last BM: on 6-17 Per nursing flowsheets     Estimated Needs:   [X] No Change since Previous Assessment  [ ] Recalculated:     Previous Nutrition Diagnosis:   1. Overweight/obesity  2. Increased nutrient needs    Nutrition Diagnosis is [X] Ongoing - continues on Efrain BID (provides 90 kcal, 14 g amino acids, vitamin C, zinc, MVI to promote wound healing       New Nutrition Diagnosis: [X] Not Applicable  [ ] Inadequate Protein Energy Intake   [ ] Inadequate Oral Intake   [ ] Excessive Energy Intake   [ ] Increased Nutrient Needs   [ ] Obesity   [ ] Altered GI Function   [ ] Unintended Weight Loss   [ ] Food & Nutrition Related Knowledge Deficit  [ ] Limited Adherence to nutrition related recommendations   [ ] Malnutrition      Interventions:   1. Recommend continuing with current plan of care  2. Encourage PO intake  3. Obtain and honor food preferences as able  4. Ongoing diet education    Monitoring & Evaluation:   [X] PO Intake   [X] Follow Up (Per Protocol)  [X] Tolerance to Diet Prescription   [X] Other: Labs    RD Remains Available.  Aruna Hatch RD
Nutrition Follow Up Note  Source: Medical Record [X] Patient [x] Family [ ]         Diet: Regular, consistent carbohydrate (evening snack), low sodium, no concentrated phosphorus, Efrain BID  Pt tolerating diet with reported good PO intake, although PO intake has been variable per nursing flow sheets (eating % of meals). Pt educated on the importance of protein at all meals due to multiple pressure injuries. Pt reports good acceptance of Efrain BID (provides 90 kcal, 14 g amino acids/serving). Denies nausea, vomiting, diarrhea, constipation. Pt denies chewing/swallowing difficulties.     Enteral/Parenteral Nutrition: N/A    Weight: 394.6 lbs (5-7)    Pertinent Medications: MEDICATIONS  (STANDING):  ammonium lactate 12% Lotion 1 Application(s) Topical two times a day  apixaban 5 milliGRAM(s) Oral two times a day  ascorbic acid 500 milliGRAM(s) Oral daily  bisacodyl 10 milliGRAM(s) Oral at bedtime  capsaicin 0.025% Cream 1 Application(s) Topical four times a day  celecoxib 100 milliGRAM(s) Oral two times a day  clotrimazole 1% Cream 1 Application(s) Topical <User Schedule>  collagenase Ointment 1 Application(s) Topical two times a day  Dakins Solution - 1/4 Strength 1 Application(s) Topical two times a day  dextrose 5%. 1000 milliLiter(s) (50 mL/Hr) IV Continuous <Continuous>  dextrose 5%. 1000 milliLiter(s) (100 mL/Hr) IV Continuous <Continuous>  dextrose 50% Injectable 25 Gram(s) IV Push once  dextrose 50% Injectable 12.5 Gram(s) IV Push once  dextrose 50% Injectable 25 Gram(s) IV Push once  gabapentin 1200 milliGRAM(s) Oral every 8 hours  glucagon  Injectable 1 milliGRAM(s) IntraMuscular once  influenza   Vaccine 0.5 milliLiter(s) IntraMuscular once  lidocaine   4% Patch 1 Patch Transdermal daily  lidocaine   4% Patch 1 Patch Transdermal daily  magnesium oxide 400 milliGRAM(s) Oral three times a day with meals  melatonin 9 milliGRAM(s) Oral at bedtime  metoprolol tartrate 50 milliGRAM(s) Oral two times a day  multivitamin 1 Tablet(s) Oral daily  mupirocin 2% Ointment 1 Application(s) Topical two times a day  naloxegol 25 milliGRAM(s) Oral daily  pantoprazole    Tablet 40 milliGRAM(s) Oral before breakfast  petrolatum white Ointment 1 Application(s) Topical every 8 hours  polyethylene glycol 3350 17 Gram(s) Oral two times a day  povidone iodine 10% Solution 1 Application(s) Topical daily  QUEtiapine 25 milliGRAM(s) Oral at bedtime  sevelamer carbonate 800 milliGRAM(s) Oral three times a day with meals  silver nitrate Applicator 6 Application(s) Topical once  tamsulosin 0.8 milliGRAM(s) Oral at bedtime  zinc sulfate 220 milliGRAM(s) Oral daily    MEDICATIONS  (PRN):  albuterol/ipratropium for Nebulization 3 milliLiter(s) Nebulizer every 6 hours PRN Shortness of Breath and/or Wheezing  dextrose Oral Gel 15 Gram(s) Oral once PRN Blood Glucose LESS THAN 70 milliGRAM(s)/deciliter  oxyCODONE    IR 20 milliGRAM(s) Oral every 6 hours PRN Severe Pain (7 - 10)  oxyCODONE    IR 15 milliGRAM(s) Oral two times a day PRN Moderate Pain (4 - 6)  oxyCODONE    IR 10 milliGRAM(s) Oral two times a day PRN Mild Pain (1 - 3)      Pertinent Labs:  06-02 Na144 mmol/L Glu 101 mg/dL[H] K+ 3.5 mmol/L Cr  1.14 mg/dL BUN 11 mg/dL 06-02 Alb 2.0 g/dL[L]        Skin: unstageable pressure injury coccyx, R buttocks unstageable coccyx Per nursing flowsheets     Edema: No edema per nursing flow sheets     Last BM: on 6-2 Per nursing flowsheets     Estimated Needs:   [X] No Change since Previous Assessment  [ ] Recalculated:     Previous Nutrition Diagnosis:   1. Overweight/obesity  2. Increased nutrient needs    Nutrition Diagnosis is [X] Ongoing  - addressed with Efrain BID, mvi, zinc, vitamin C to promote wound healing    New Nutrition Diagnosis: [X] Not Applicable  [ ] Inadequate Protein Energy Intake   [ ] Inadequate Oral Intake   [ ] Excessive Energy Intake   [ ] Increased Nutrient Needs   [ ] Obesity   [ ] Altered GI Function   [ ] Unintended Weight Loss   [ ] Food & Nutrition Related Knowledge Deficit  [ ] Limited Adherence to nutrition related recommendations   [ ] Malnutrition      Interventions:   1. Recommend continuing with current plan of care  2. Encourage PO intake  3. Obtain and honor food preferences as able      Monitoring & Evaluation:   [X] PO Intake   [X] Follow Up (Per Protocol)  [X] Tolerance to Diet Prescription   [X] Other: Labs    RD Remains Available.  Aruna Hatch RD
Nutrition Follow Up Note  Source: Medical Record [X] Patient [x] Family [ ]         Diet: Regular, consistent carbohydrate (evening snack), low sodium, no concentrated phosphorus, Ensure Max (provides 150 kcal, 30 g protein/serving) BID, Efrain BID  Pt tolerating diet with good PO intake per pt. Discussed adequate protein intake & oral nutrition supplements, which pt reports he has good acceptance of. Discussed food preferences to optimize PO intake. Denies nausea, vomiting, diarrhea, constipation. Pt denies chewing/swallowing difficulties.     Enteral/Parenteral Nutrition: N/A    Pertinent Medications: MEDICATIONS  (STANDING):  amLODIPine   Tablet 10 milliGRAM(s) Oral daily  ammonium lactate 12% Lotion 1 Application(s) Topical two times a day  apixaban 5 milliGRAM(s) Oral two times a day  ascorbic acid 500 milliGRAM(s) Oral daily  bisacodyl 10 milliGRAM(s) Oral at bedtime  capsaicin 0.025% Cream 1 Application(s) Topical four times a day  celecoxib 200 milliGRAM(s) Oral every 12 hours  clotrimazole 1% Cream 1 Application(s) Topical <User Schedule>  Dakins Solution - 1/4 Strength 1 Application(s) Topical two times a day  dextrose 5%. 1000 milliLiter(s) (50 mL/Hr) IV Continuous <Continuous>  dextrose 5%. 1000 milliLiter(s) (100 mL/Hr) IV Continuous <Continuous>  dextrose 50% Injectable 25 Gram(s) IV Push once  dextrose 50% Injectable 12.5 Gram(s) IV Push once  dextrose 50% Injectable 25 Gram(s) IV Push once  DULoxetine 60 milliGRAM(s) Oral daily  gabapentin 1200 milliGRAM(s) Oral every 8 hours  glucagon  Injectable 1 milliGRAM(s) IntraMuscular once  influenza   Vaccine 0.5 milliLiter(s) IntraMuscular once  lidocaine   4% Patch 1 Patch Transdermal daily  lidocaine   4% Patch 1 Patch Transdermal daily  magnesium oxide 400 milliGRAM(s) Oral three times a day with meals  melatonin 9 milliGRAM(s) Oral at bedtime  multivitamin 1 Tablet(s) Oral daily  mupirocin 2% Ointment 1 Application(s) Topical two times a day  naloxegol 25 milliGRAM(s) Oral daily  pantoprazole    Tablet 40 milliGRAM(s) Oral before breakfast  polyethylene glycol 3350 17 Gram(s) Oral two times a day  povidone iodine 10% Solution 1 Application(s) Topical daily  QUEtiapine 25 milliGRAM(s) Oral at bedtime  sevelamer carbonate 800 milliGRAM(s) Oral three times a day with meals  silver nitrate Applicator 6 Application(s) Topical once  tamsulosin 0.8 milliGRAM(s) Oral at bedtime  zinc sulfate 220 milliGRAM(s) Oral daily    MEDICATIONS  (PRN):  albuterol/ipratropium for Nebulization 3 milliLiter(s) Nebulizer every 6 hours PRN Shortness of Breath and/or Wheezing  dextrose Oral Gel 15 Gram(s) Oral once PRN Blood Glucose LESS THAN 70 milliGRAM(s)/deciliter  oxyCODONE    IR 20 milliGRAM(s) Oral every 6 hours PRN Severe Pain (7 - 10)  oxyCODONE    IR 15 milliGRAM(s) Oral two times a day PRN Moderate Pain (4 - 6)  oxyCODONE    IR 10 milliGRAM(s) Oral two times a day PRN Mild Pain (1 - 3)      Pertinent Labs:  05-12 Na141 mmol/L Glu 89 mg/dL K+ 3.8 mmol/L Cr  1.06 mg/dL BUN 15 mg/dL 05-12 Alb 2.3 g/dL[L]        Skin: unstageable pressure injury coccyx, unstageable R buttock Per nursing flowsheets     Edema: No edema per nursing flow sheets     Last BM: on 5-12 Per nursing flowsheets     Estimated Needs:   [X] No Change since Previous Assessment  [ ] Recalculated:     Previous Nutrition Diagnosis:   1. Overweight/obesity  2. Increased nutrient needs    Nutrition Diagnosis is [X] Ongoing  - continues on MVI, vitamin C, zinc, efrain, Ensure Max (provides 150 kcal, 30 g protein/serving) to promote wound healing    New Nutrition Diagnosis: [X] Not Applicable  [ ] Inadequate Protein Energy Intake   [ ] Inadequate Oral Intake   [ ] Excessive Energy Intake   [ ] Increased Nutrient Needs   [ ] Obesity   [ ] Altered GI Function   [ ] Unintended Weight Loss   [ ] Food & Nutrition Related Knowledge Deficit  [ ] Limited Adherence to nutrition related recommendations   [ ] Malnutrition      Interventions:   1. Recommend continuing with current plan of care  2. Encourage PO intake  3. Obtain and honor food preferences as able  4. Ongoing diet education    Monitoring & Evaluation:    [X] PO Intake   [X] Follow Up (Per Protocol)  [X] Tolerance to Diet Prescription   [X] Other: Labs     RD Remains Available.  Aruna Hatch, RD
NUTRITION FOLLOW UP    SOURCE: Patient [X)   Family [ ]    Medical Record (X)    Diet, Regular:   Consistent Carbohydrate {Evening Snack} (CSTCHOSN)  Low Sodium  No Concentrated Phosphorus  Efrain(7 Gm Arginine/7 Gm Glut/1.2 Gm HMB     Qty per Day:  BID  Supplement Feeding Modality:  Oral  Ensure Max Cans or Servings Per Day:  1       Frequency:  Two Times a day (04-27-25 @ 13:24) [Active]    Pt noted with fair/good appetite +good acceptance of oral nutrition supplements. Denies N/V/D, constipation. Last BM 5/1. Pt remains with hyperphosphatemia- continue dietary restriction and renvela. Pt also continues on MVI, Vitamin C, Zinc Sulfate to support wound healing in addition to Efrain BID. Noted optimal glycemic control, POCT d/c'd on 4/26.     PO INTAKE:  51-75%    CURRENT WEIGHT: 408.2lbs (5/1)    PERTINENT MEDS:   Pertinent Medications: MEDICATIONS  (STANDING):  amLODIPine   Tablet 10 milliGRAM(s) Oral daily  ammonium lactate 12% Lotion 1 Application(s) Topical two times a day  apixaban 5 milliGRAM(s) Oral two times a day  ascorbic acid 500 milliGRAM(s) Oral daily  bisacodyl 10 milliGRAM(s) Oral at bedtime  capsaicin 0.025% Cream 1 Application(s) Topical four times a day  clotrimazole 1% Cream 1 Application(s) Topical <User Schedule>  Dakins Solution - 1/4 Strength 1 Application(s) Topical two times a day  dextrose 5%. 1000 milliLiter(s) (50 mL/Hr) IV Continuous <Continuous>  dextrose 5%. 1000 milliLiter(s) (100 mL/Hr) IV Continuous <Continuous>  dextrose 50% Injectable 25 Gram(s) IV Push once  dextrose 50% Injectable 12.5 Gram(s) IV Push once  dextrose 50% Injectable 25 Gram(s) IV Push once  DULoxetine 60 milliGRAM(s) Oral daily  gabapentin 1200 milliGRAM(s) Oral every 8 hours  glucagon  Injectable 1 milliGRAM(s) IntraMuscular once  influenza   Vaccine 0.5 milliLiter(s) IntraMuscular once  lidocaine   4% Patch 1 Patch Transdermal daily  lidocaine   4% Patch 1 Patch Transdermal daily  melatonin 3 milliGRAM(s) Oral at bedtime  multivitamin 1 Tablet(s) Oral daily  mupirocin 2% Ointment 1 Application(s) Topical two times a day  naloxegol 25 milliGRAM(s) Oral daily  pantoprazole    Tablet 40 milliGRAM(s) Oral before breakfast  piperacillin/tazobactam IVPB.. 3.375 Gram(s) IV Intermittent every 8 hours  polyethylene glycol 3350 17 Gram(s) Oral two times a day  povidone iodine 10% Solution 1 Application(s) Topical daily  QUEtiapine 25 milliGRAM(s) Oral at bedtime  sevelamer carbonate 800 milliGRAM(s) Oral three times a day with meals  tamsulosin 0.4 milliGRAM(s) Oral at bedtime  zinc sulfate 220 milliGRAM(s) Oral daily    MEDICATIONS  (PRN):  acetaminophen     Tablet .. 650 milliGRAM(s) Oral every 6 hours PRN Temp greater or equal to 38C (100.4F), Mild Pain (1 - 3), Moderate Pain (4 - 6)  albuterol/ipratropium for Nebulization 3 milliLiter(s) Nebulizer every 6 hours PRN Shortness of Breath and/or Wheezing  cyclobenzaprine 5 milliGRAM(s) Oral three times a day PRN Muscle Spasm  dextrose Oral Gel 15 Gram(s) Oral once PRN Blood Glucose LESS THAN 70 milliGRAM(s)/deciliter  oxyCODONE    IR 5 milliGRAM(s) Oral two times a day PRN Severe Pain (7 - 10) related to dressing change  oxyCODONE    IR 15 milliGRAM(s) Oral every 6 hours PRN Severe Pain (7 - 10)      PERTINENT LABS:  05-01 Na140 mmol/L Glu 97 mg/dL K+ 3.6 mmol/L Cr  1.03 mg/dL BUN 13 mg/dL 05-01 Phos 5.8 mg/dL[H] 05-01 Alb 2.1 g/dL[L]    CAPILLARY BLOOD GLUCOSE    SKIN:  Pressure Injury 1: coccyx, Unstageable  Pressure Injury 2: right buttock, Unstageable  Pressure Injury 3: left buttock, Stage III    EDEMA: +3 edema bilateral legs  LAST BM: 5/1    ESTIMATED NEEDS:   [X] no change since previous assessment  [ ] recalculated:     PREVIOUS NUTRITION DIAGNOSIS:    1. Overweight/obesity  2. Increased nutritional needs- addressed with Ensure Max BID, Efrain BID, MVI, Vitamin C, Zinc sulfate supplementation     NUTRITION DIAGNOSIS is :  (X)  Ongoing       NEW NUTRITION DIAGNOSIS: N/A    NUTRITION RECOMMENDATIONS:   1. Continue current nutrition plan of care   2. Ensure Max BID, Efrain BID  3. MVI, Vitamin C 500mg daily, Zinc Sulfate 220mg x 14 days     MONITORING AND EVALUATION:   1. Tolerance to diet prescription   2. PO intake  3. Weights  4. Labs  5. Follow Up per protocol     RD to remain available   Idalia Pyle RDN   [phone] or TEAMS
NUTRITION FOLLOW UP    SOURCE: Patient [X)   Family [ ]    Medical Record (X)    Diet, Regular:   Consistent Carbohydrate {Evening Snack} (CSTCHOSN)  Low Sodium  No Concentrated Phosphorus  Efrain(7 Gm Arginine/7 Gm Glut/1.2 Gm HMB     Qty per Day:  BID  Supplement Feeding Modality:  Oral  Ensure Max Cans or Servings Per Day:  1       Frequency:  Two Times a day (05-03-25 @ 00:02) [Active]    Pt noted with fair/good appetite +good acceptance of oral nutrition supplements. Denies N/V/D, constipation. Last BM 5/8. Most recent phos level elevated (5/1)- continue dietary restriction and renvela. Pt also continues on MVI, Vitamin C, Zinc Sulfate to support wound healing in addition to Efrain BID. Noted optimal glycemic control, POCT d/c'd on 4/26.     PO INTAKE:  51-75%    CURRENT WEIGHT: 394.6lbs (5/7)   408.2lbs (5/1)  +edema likely contributing to weight change    PERTINENT MEDS:   Pertinent Medications: MEDICATIONS  (STANDING):  amLODIPine   Tablet 10 milliGRAM(s) Oral daily  ammonium lactate 12% Lotion 1 Application(s) Topical two times a day  apixaban 5 milliGRAM(s) Oral two times a day  ascorbic acid 500 milliGRAM(s) Oral daily  bisacodyl 10 milliGRAM(s) Oral at bedtime  capsaicin 0.025% Cream 1 Application(s) Topical four times a day  celecoxib 200 milliGRAM(s) Oral every 12 hours  clotrimazole 1% Cream 1 Application(s) Topical <User Schedule>  Dakins Solution - 1/4 Strength 1 Application(s) Topical two times a day  dextrose 5%. 1000 milliLiter(s) (50 mL/Hr) IV Continuous <Continuous>  dextrose 5%. 1000 milliLiter(s) (100 mL/Hr) IV Continuous <Continuous>  dextrose 50% Injectable 25 Gram(s) IV Push once  dextrose 50% Injectable 12.5 Gram(s) IV Push once  dextrose 50% Injectable 25 Gram(s) IV Push once  DULoxetine 60 milliGRAM(s) Oral daily  gabapentin 1200 milliGRAM(s) Oral every 8 hours  glucagon  Injectable 1 milliGRAM(s) IntraMuscular once  influenza   Vaccine 0.5 milliLiter(s) IntraMuscular once  lidocaine   4% Patch 1 Patch Transdermal daily  lidocaine   4% Patch 1 Patch Transdermal daily  magnesium oxide 400 milliGRAM(s) Oral three times a day with meals  melatonin 9 milliGRAM(s) Oral at bedtime  multivitamin 1 Tablet(s) Oral daily  mupirocin 2% Ointment 1 Application(s) Topical two times a day  naloxegol 25 milliGRAM(s) Oral daily  pantoprazole    Tablet 40 milliGRAM(s) Oral before breakfast  piperacillin/tazobactam IVPB.. 3.375 Gram(s) IV Intermittent every 8 hours  polyethylene glycol 3350 17 Gram(s) Oral two times a day  povidone iodine 10% Solution 1 Application(s) Topical daily  QUEtiapine 25 milliGRAM(s) Oral at bedtime  sevelamer carbonate 800 milliGRAM(s) Oral three times a day with meals  silver nitrate Applicator 6 Application(s) Topical once  tamsulosin 0.8 milliGRAM(s) Oral at bedtime  zinc sulfate 220 milliGRAM(s) Oral daily    MEDICATIONS  (PRN):  albuterol/ipratropium for Nebulization 3 milliLiter(s) Nebulizer every 6 hours PRN Shortness of Breath and/or Wheezing  dextrose Oral Gel 15 Gram(s) Oral once PRN Blood Glucose LESS THAN 70 milliGRAM(s)/deciliter  oxyCODONE    IR 20 milliGRAM(s) Oral every 6 hours PRN Severe Pain (7 - 10)  oxyCODONE    IR 15 milliGRAM(s) Oral two times a day PRN Moderate Pain (4 - 6)  oxyCODONE    IR 10 milliGRAM(s) Oral two times a day PRN Mild Pain (1 - 3)      PERTINENT LABS:  05-09 Na140 mmol/L Glu 100 mg/dL[H] K+ 3.6 mmol/L Cr  1.15 mg/dL BUN 12 mg/dL 05-09 Alb 2.3 g/dL[L]    SKIN:  Pressure Injury 1: coccyx, Unstageable  Pressure Injury 2: right buttock, Unstageable  Pressure Injury 3: left buttock, Stage III    EDEMA: +3 edema bilateral legs  LAST BM: 5/8    ESTIMATED NEEDS:   [X] no change since previous assessment  [ ] recalculated:     PREVIOUS NUTRITION DIAGNOSIS:    1. Overweight/obesity  2. Increased nutritional needs- addressed with Ensure Max BID, Efrain BID, MVI, Vitamin C, Zinc sulfate supplementation     NUTRITION DIAGNOSIS is :  (X)  Ongoing       NEW NUTRITION DIAGNOSIS: N/A    NUTRITION RECOMMENDATIONS:   1. Continue current nutrition plan of care   2. Ensure Max BID, Efrain BID  3. MVI, Vitamin C 500mg daily, Zinc Sulfate 220mg x 14 days     MONITORING AND EVALUATION:   1. Tolerance to diet prescription   2. PO intake  3. Weights prn  4. Labs  5. Follow Up per protocol     RD to remain available   Idalia Pyle RDN   [phone] or TEAMS.
NUTRITION Follow Up Note    Pt in bed during time of visit. Pt endorses adequate appetite/intake at this time. Pt with varied intakes as per nursing flow sheets. Pt with Ensure Max supplements accumulating at bedside. Recommended switching to Ensure Max 11oz PO Daily (Provides 150kcal & 30grams of Protein) however patient requests keeping BID. Will continue to monitor and consider reducing to daily supplement. Discussed with patient healthy weight management, consistent CHO, low sodium and no concentrated phosphorus diet. Pt receptive. 3+ bilateral leg edema noted. LBM 5/21 as per patient. Pt with multiple wounds - endorses good adherence to Efrain BID.     SOURCE: Patient [X]  Medical Record [X]  Nursing Staff [X]  Family Member []    DIET: Diet, Regular:   Consistent Carbohydrate {Evening Snack} (CSTCHOSN)  Low Sodium  No Concentrated Phosphorus  Efrain(7 Gm Arginine/7 Gm Glut/1.2 Gm HMB     Qty per Day:  BID  Supplement Feeding Modality:  Oral  Ensure Max Cans or Servings Per Day:  1       Frequency:  Two Times a day (05-03-25 @ 00:02) [Active]      WEIGHT TRENDS:  Patient admitted at 408 lbs using bed scale   Pt current  - actual weight   Discrepancy most likely attributed to + edema and weight methods used.   Continue to monitor weights as available.     PERTINENT MEDICATIONS: MEDICATIONS  (STANDING):  amLODIPine   Tablet 10 milliGRAM(s) Oral daily  ammonium lactate 12% Lotion 1 Application(s) Topical two times a day  apixaban 5 milliGRAM(s) Oral two times a day  ascorbic acid 500 milliGRAM(s) Oral daily  bisacodyl 10 milliGRAM(s) Oral at bedtime  capsaicin 0.025% Cream 1 Application(s) Topical four times a day  clotrimazole 1% Cream 1 Application(s) Topical <User Schedule>  collagenase Ointment 1 Application(s) Topical two times a day  Dakins Solution - 1/4 Strength 1 Application(s) Topical two times a day  dextrose 5%. 1000 milliLiter(s) (100 mL/Hr) IV Continuous <Continuous>  dextrose 5%. 1000 milliLiter(s) (50 mL/Hr) IV Continuous <Continuous>  dextrose 50% Injectable 25 Gram(s) IV Push once  dextrose 50% Injectable 12.5 Gram(s) IV Push once  dextrose 50% Injectable 25 Gram(s) IV Push once  DULoxetine 60 milliGRAM(s) Oral daily  gabapentin 1200 milliGRAM(s) Oral every 8 hours  glucagon  Injectable 1 milliGRAM(s) IntraMuscular once  influenza   Vaccine 0.5 milliLiter(s) IntraMuscular once  lidocaine   4% Patch 1 Patch Transdermal daily  lidocaine   4% Patch 1 Patch Transdermal daily  magnesium oxide 400 milliGRAM(s) Oral three times a day with meals  melatonin 9 milliGRAM(s) Oral at bedtime  multivitamin 1 Tablet(s) Oral daily  mupirocin 2% Ointment 1 Application(s) Topical two times a day  naloxegol 25 milliGRAM(s) Oral daily  pantoprazole    Tablet 40 milliGRAM(s) Oral before breakfast  petrolatum white Ointment 1 Application(s) Topical every 8 hours  polyethylene glycol 3350 17 Gram(s) Oral two times a day  povidone iodine 10% Solution 1 Application(s) Topical daily  QUEtiapine 25 milliGRAM(s) Oral at bedtime  sevelamer carbonate 800 milliGRAM(s) Oral three times a day with meals  silver nitrate Applicator 6 Application(s) Topical once  tamsulosin 0.8 milliGRAM(s) Oral at bedtime  zinc sulfate 220 milliGRAM(s) Oral daily    MEDICATIONS  (PRN):  albuterol/ipratropium for Nebulization 3 milliLiter(s) Nebulizer every 6 hours PRN Shortness of Breath and/or Wheezing  dextrose Oral Gel 15 Gram(s) Oral once PRN Blood Glucose LESS THAN 70 milliGRAM(s)/deciliter  oxyCODONE    IR 20 milliGRAM(s) Oral every 6 hours PRN Severe Pain (7 - 10)  oxyCODONE    IR 15 milliGRAM(s) Oral two times a day PRN Moderate Pain (4 - 6)  oxyCODONE    IR 10 milliGRAM(s) Oral two times a day PRN Mild Pain (1 - 3)      PERTINENT LABS:  05-19 Na143 mmol/L Glu 89 mg/dL K+ 3.7 mmol/L Cr  1.02 mg/dL BUN 9 mg/dL 05-19 Alb 2.2 g/dL[L]        ESTIMATED NEEDS:   [X] No Change Since Previous Assessment    PREVIOUS NUTRITION DIAGNOSIS:  1. Overweight/obesity  2. Increased nutrient needs    NUTRITION DIAGNOSIS IS [X] Ongoing     NEW NUTRITION DIAGNOSIS: [X] Not Applicable    INTERVENTIONS:   1. Continue Current Nutrition Care Regimen at This Time.   2. Nutrition education provided   3. RD to remain available.       MONITORING & EVALUATION:   1. Weights   2. PO intakes   3. Skin integrity   4. Tolerance to diet prescription   5. Labs & POCT  6. Follow up (per protocol)    Registered Dietitian/Nutritionist Remains Available.  Robby Jara RD    Contact: [phone] or via [place] TEAMS
Ordered xray to confirm midline placement. Spoke with Dr. Marina, able to see 10cm midline catheter.     Midline is easy to flush and blood return also obtained.
Primary RN questioned parameters for metoprolol.    BP is 99/66, HR 77. Pt was noted to have HR >100 yesterday.     Advised RN to postpone metoprolol and reassess BP and HR at 8am.     Will sign out to day team.
Pt seen at bedside to be fitted with bilateral offloading shoes.  XL dispensed, mate to these will be delivered next week, awaiting to be ordered and received.    López Wilks CO  [phone]
Team SW informed us that patient's auntie Katelyn Luther, who called stating that patient is not following through with plan as discussed prior to dc form acute rehab     She stated that patient prefers to stay in bed at all time  Only leave the bed to  and back to bed    Recently the home visiting RN is getting worried that the sacral ulcer has increased drainage  Wondering if wound vac should be considered. I explained that there was significant improvement of his sacral wound, close monitoring by plastics and wound care teams till decision was made to remove vac several days prior to his dc   He remained stable on wet to dry dressing prior to dc     I asked her if she has discussed these issues with patient's girlfriend or father, and she stated no  I told her that post dc plan with regards to activities, wound are and community re integration were d/w father and patient's girlfried  Since she never saw patient during acute rehab course, I advised her to liaise with these family member and update us if issue continue to linger     She was happy with our discussion and clarification

## 2025-06-20 NOTE — CHART NOTE - NSCHARTNOTESELECT_GEN_ALL_CORE
Event Note
Event Note
Nutrition Services
Response to call from patient's family member/Event Note
medication adjustment
Nutrition Services

## 2025-07-15 ENCOUNTER — APPOINTMENT (OUTPATIENT)
Dept: FAMILY MEDICINE | Facility: CLINIC | Age: 40
End: 2025-07-15

## 2025-07-15 VITALS — BODY MASS INDEX: 45.1 KG/M2 | WEIGHT: 315 LBS | HEIGHT: 70 IN

## 2025-07-15 PROBLEM — K21.9 CHRONIC GERD: Status: ACTIVE | Noted: 2025-07-15

## 2025-07-15 PROBLEM — S31.000A WOUND OF SACRAL REGION: Status: ACTIVE | Noted: 2025-07-15

## 2025-07-15 PROBLEM — K21.9 GASTROESOPHAGEAL REFLUX DISEASE: Status: ACTIVE | Noted: 2025-07-15

## 2025-07-15 PROBLEM — I48.20 CHRONIC ATRIAL FIBRILLATION: Status: ACTIVE | Noted: 2025-07-15

## 2025-07-15 PROCEDURE — 99214 OFFICE O/P EST MOD 30 MIN: CPT | Mod: 95

## 2025-07-15 RX ORDER — PANTOPRAZOLE 40 MG/1
40 TABLET, DELAYED RELEASE ORAL DAILY
Qty: 90 | Refills: 0 | Status: ACTIVE | COMMUNITY
Start: 1900-01-01 | End: 1900-01-01

## 2025-07-15 RX ORDER — NALOXEGOL OXALATE 25 MG/1
25 TABLET, FILM COATED ORAL DAILY
Refills: 0 | Status: ACTIVE | COMMUNITY

## 2025-07-15 RX ORDER — COLD-HOT PACK
50 EACH MISCELLANEOUS
Refills: 0 | Status: ACTIVE | COMMUNITY

## 2025-07-15 RX ORDER — OXYCODONE 5 MG/1
5 TABLET ORAL DAILY
Refills: 0 | Status: ACTIVE | COMMUNITY

## 2025-07-15 RX ORDER — MV-MIN/FOLIC/VIT K/LYCOP/COQ10 200-100MCG
CAPSULE ORAL
Refills: 0 | Status: ACTIVE | COMMUNITY

## 2025-07-15 RX ORDER — ASCORBIC ACID 500 MG
500 TABLET ORAL
Refills: 0 | Status: ACTIVE | COMMUNITY

## 2025-07-15 RX ORDER — APIXABAN 5 MG/1
5 TABLET, FILM COATED ORAL
Qty: 120 | Refills: 2 | Status: ACTIVE | COMMUNITY
Start: 1900-01-01 | End: 1900-01-01

## 2025-07-15 RX ORDER — GABAPENTIN 400 MG/1
400 CAPSULE ORAL EVERY 8 HOURS
Qty: 810 | Refills: 0 | Status: ACTIVE | COMMUNITY
Start: 1900-01-01 | End: 1900-01-01

## 2025-07-15 RX ORDER — SEVELAMER HYDROCHLORIDE 800 MG/1
800 TABLET, FILM COATED ORAL 3 TIMES DAILY
Refills: 0 | Status: ACTIVE | COMMUNITY

## 2025-07-15 RX ORDER — QUETIAPINE FUMARATE 25 MG/1
25 TABLET ORAL
Qty: 90 | Refills: 0 | Status: ACTIVE | COMMUNITY

## 2025-07-15 RX ORDER — OMEGA-3/DHA/EPA/FISH OIL 300-1000MG
400 CAPSULE ORAL 3 TIMES DAILY
Qty: 90 | Refills: 0 | Status: ACTIVE | COMMUNITY

## 2025-07-24 RX ORDER — COLLAGENASE SANTYL 250 [ARB'U]/G
250 OINTMENT TOPICAL DAILY
Qty: 1 | Refills: 1 | Status: ACTIVE | COMMUNITY
Start: 2025-07-24 | End: 1900-01-01

## 2025-08-14 PROCEDURE — 80053 COMPREHEN METABOLIC PANEL: CPT

## 2025-08-14 PROCEDURE — 83735 ASSAY OF MAGNESIUM: CPT

## 2025-08-14 PROCEDURE — 84100 ASSAY OF PHOSPHORUS: CPT

## 2025-08-14 PROCEDURE — 97110 THERAPEUTIC EXERCISES: CPT | Mod: GP

## 2025-08-14 PROCEDURE — 76770 US EXAM ABDO BACK WALL COMP: CPT

## 2025-08-14 PROCEDURE — 73721 MRI JNT OF LWR EXTRE W/O DYE: CPT

## 2025-08-14 PROCEDURE — 36415 COLL VENOUS BLD VENIPUNCTURE: CPT

## 2025-08-14 PROCEDURE — 93970 EXTREMITY STUDY: CPT

## 2025-08-14 PROCEDURE — 92610 EVALUATE SWALLOWING FUNCTION: CPT | Mod: GN

## 2025-08-14 PROCEDURE — 97542 WHEELCHAIR MNGMENT TRAINING: CPT | Mod: GO

## 2025-08-14 PROCEDURE — 85025 COMPLETE CBC W/AUTO DIFF WBC: CPT

## 2025-08-14 PROCEDURE — 71045 X-RAY EXAM CHEST 1 VIEW: CPT

## 2025-08-14 PROCEDURE — 73501 X-RAY EXAM HIP UNI 1 VIEW: CPT

## 2025-08-14 PROCEDURE — 97535 SELF CARE MNGMENT TRAINING: CPT | Mod: GO

## 2025-08-14 PROCEDURE — 80048 BASIC METABOLIC PNL TOTAL CA: CPT

## 2025-08-14 PROCEDURE — 87635 SARS-COV-2 COVID-19 AMP PRB: CPT

## 2025-08-14 PROCEDURE — 82962 GLUCOSE BLOOD TEST: CPT

## 2025-08-14 PROCEDURE — 97161 PT EVAL LOW COMPLEX 20 MIN: CPT | Mod: GP

## 2025-08-14 PROCEDURE — 74178 CT ABD&PLV WO CNTR FLWD CNTR: CPT | Mod: MC

## 2025-08-14 PROCEDURE — 92523 SPEECH SOUND LANG COMPREHEN: CPT | Mod: GN

## 2025-08-14 PROCEDURE — 72192 CT PELVIS W/O DYE: CPT | Mod: MC

## 2025-08-14 PROCEDURE — 83036 HEMOGLOBIN GLYCOSYLATED A1C: CPT

## 2025-08-14 PROCEDURE — 97165 OT EVAL LOW COMPLEX 30 MIN: CPT | Mod: GO

## 2025-08-14 PROCEDURE — 97116 GAIT TRAINING THERAPY: CPT | Mod: GP

## 2025-08-14 PROCEDURE — 97530 THERAPEUTIC ACTIVITIES: CPT | Mod: GO

## 2025-08-14 PROCEDURE — 93005 ELECTROCARDIOGRAM TRACING: CPT

## (undated) DEVICE — POSITIONER STRAP ARMBOARD VELCRO TS-30

## (undated) DEVICE — CONTAINER FORMALIN 10% 20ML

## (undated) DEVICE — VENODYNE/SCD SLEEVE CALF MEDIUM

## (undated) DEVICE — TRAP SPECIMEN SPUTUM 40CC

## (undated) DEVICE — SOL IRR POUR NS 0.9% 500ML

## (undated) DEVICE — BIOPSY FORCEP RADIAL JAW 4 STANDARD WITH NEEDLE

## (undated) DEVICE — GUN DIL ALLIANCE

## (undated) DEVICE — VALVE SUCTION EVIS 160/200/240

## (undated) DEVICE — VALVE BIOPSY BRONCHOVIDEOSCOPE

## (undated) DEVICE — BAG URINE W METER 2L

## (undated) DEVICE — DURABLE MEDICAL EQUIPMENT: Type: DURABLE MEDICAL EQUIPMENT

## (undated) DEVICE — BITE BLOCK PED LATEX FREE

## (undated) DEVICE — LUBRICATING JELLY ONESHOT 1.25OZ

## (undated) DEVICE — WARMING BLANKET LOWER ADULT

## (undated) DEVICE — SYR SLIP 10CC

## (undated) DEVICE — DRSG CURITY GAUZE SPONGE 4 X 4" 12-PLY

## (undated) DEVICE — DRSG TELFA 3 X 8

## (undated) DEVICE — ELCTR GROUNDING PAD ADULT COVIDIEN

## (undated) DEVICE — DRAPE 3/4 SHEET 52X76"

## (undated) DEVICE — ADAPTER FIBEROPTIC BRONCHOSCOPE DUAL AXIS SWIVEL

## (undated) DEVICE — SYR LUER SLIP TIP 30CC

## (undated) DEVICE — PREP CHLORAPREP HI-LITE ORANGE 26ML

## (undated) DEVICE — SOL IRR POUR H2O 500ML

## (undated) DEVICE — TUBING SUCTION NONCONDUCTIVE 6MM X 12FT

## (undated) DEVICE — PROTECTOR HEEL / ELBOW FLUFFY

## (undated) DEVICE — BITE BLOCK ADULT 20 X 27MM (GREEN)